# Patient Record
Sex: MALE | Race: WHITE | NOT HISPANIC OR LATINO | Employment: OTHER | URBAN - METROPOLITAN AREA
[De-identification: names, ages, dates, MRNs, and addresses within clinical notes are randomized per-mention and may not be internally consistent; named-entity substitution may affect disease eponyms.]

---

## 2017-01-03 DIAGNOSIS — I25.10 CORONARY ARTERY DISEASE INVOLVING NATIVE CORONARY ARTERY OF NATIVE HEART WITHOUT ANGINA PECTORIS: Primary | ICD-10-CM

## 2017-01-03 RX ORDER — CLOPIDOGREL BISULFATE 75 MG/1
75 TABLET ORAL DAILY
Qty: 90 TABLET | Refills: 3 | Status: ON HOLD | OUTPATIENT
Start: 2017-01-03 | End: 2017-04-26

## 2017-03-10 ENCOUNTER — TELEPHONE (OUTPATIENT)
Dept: FAMILY MEDICINE | Facility: CLINIC | Age: 67
End: 2017-03-10

## 2017-03-10 NOTE — LETTER
[facility]  [address] [address]  Phone: [phone]  March 10, 2017      Jayro Elder  [address]  [place] [facility] [address]      Dear Jayro,    We care about your health and have reviewed your health plan including your medical conditions, medications, and lab results.  Based on this review, it is recommended that you follow up regarding the following health topic(s):  -Diabetes    We recommend you take the following action(s):  -schedule a FOLLOWUP APPOINTMENT.     Please call us at the [facility] (or use F[employer]) to address the above recommendations.     Thank you for trusting [facility] and we appreciate the opportunity to serve you.  We look forward to supporting your healthcare needs in the future.    Healthy Regards,    Your Health Care Team  [facility]

## 2017-03-10 NOTE — TELEPHONE ENCOUNTER
Panel Management Review      Patient has the following on his problem list:     Diabetes    ASA: Passed    Last A1C  Lab Results   Component Value Date    A1C 10.5 12/02/2014    A1C 10.4 03/04/2014    A1C 9.2 11/14/2013    A1C 9.8 02/05/2009    A1C 11.3 04/30/2008     A1C tested: Passed    Last LDL:    Lab Results   Component Value Date    CHOL 109 06/30/2016     Lab Results   Component Value Date    HDL 47 06/30/2016     Lab Results   Component Value Date    LDL 47 06/30/2016     Lab Results   Component Value Date    TRIG 75 06/30/2016     Lab Results   Component Value Date    CHOLHDLRATIO 2.3 02/02/2015     Lab Results   Component Value Date    NHDL 62 06/30/2016       Is the patient on a Statin? YES             Is the patient on Aspirin? YES    Medications     HMG CoA Reductase Inhibitors    simvastatin (ZOCOR) 40 MG tablet    Salicylates    aspirin 81 MG EC tablet          Last three blood pressure readings:  BP Readings from Last 3 Encounters:   08/19/16 124/80   06/30/16 130/70   06/28/16 122/72       Date of last diabetes office visit: 6/17/16     Tobacco History:     History   Smoking Status     Former Smoker     Packs/day: 1.00     Years: 25.00     Types: Cigarettes     Quit date: 7/25/2005   Smokeless Tobacco     Never Used           Composite cancer screening  Chart review shows that this patient is due/due soon for the following Colonoscopy  Summary:    Patient is due/failing the following:   A1C and FOLLOW UP    Action needed:   Patient needs office visit for Diabetes check.    Type of outreach:    Sent letter.    Questions for provider review:    None                                                                                                                                    Nabila MICHAEL M.A.       Chart routed to Care Team .

## 2017-03-31 ENCOUNTER — OFFICE VISIT (OUTPATIENT)
Dept: FAMILY MEDICINE | Facility: CLINIC | Age: 67
End: 2017-03-31
Payer: COMMERCIAL

## 2017-03-31 VITALS
WEIGHT: 217.4 LBS | HEART RATE: 78 BPM | SYSTOLIC BLOOD PRESSURE: 132 MMHG | HEIGHT: 76 IN | TEMPERATURE: 97.7 F | BODY MASS INDEX: 26.47 KG/M2 | DIASTOLIC BLOOD PRESSURE: 80 MMHG | OXYGEN SATURATION: 98 %

## 2017-03-31 DIAGNOSIS — E11.65 TYPE 2 DIABETES MELLITUS WITH HYPERGLYCEMIA, UNSPECIFIED LONG TERM INSULIN USE STATUS: ICD-10-CM

## 2017-03-31 DIAGNOSIS — I25.10 CORONARY ARTERY DISEASE INVOLVING NATIVE CORONARY ARTERY OF NATIVE HEART WITHOUT ANGINA PECTORIS: ICD-10-CM

## 2017-03-31 DIAGNOSIS — M79.672 LEFT FOOT PAIN: ICD-10-CM

## 2017-03-31 DIAGNOSIS — Z01.818 PREOP GENERAL PHYSICAL EXAM: Primary | ICD-10-CM

## 2017-03-31 LAB
ERYTHROCYTE [DISTWIDTH] IN BLOOD BY AUTOMATED COUNT: 12.4 % (ref 10–15)
HBA1C MFR BLD: 13.1 % (ref 4.3–6)
HCT VFR BLD AUTO: 42.8 % (ref 40–53)
HGB BLD-MCNC: 14.5 G/DL (ref 13.3–17.7)
MCH RBC QN AUTO: 28.7 PG (ref 26.5–33)
MCHC RBC AUTO-ENTMCNC: 33.9 G/DL (ref 31.5–36.5)
MCV RBC AUTO: 85 FL (ref 78–100)
PLATELET # BLD AUTO: 247 10E9/L (ref 150–450)
RBC # BLD AUTO: 5.06 10E12/L (ref 4.4–5.9)
WBC # BLD AUTO: 6.7 10E9/L (ref 4–11)

## 2017-03-31 PROCEDURE — 85027 COMPLETE CBC AUTOMATED: CPT | Performed by: FAMILY MEDICINE

## 2017-03-31 PROCEDURE — 83036 HEMOGLOBIN GLYCOSYLATED A1C: CPT | Performed by: FAMILY MEDICINE

## 2017-03-31 PROCEDURE — 36415 COLL VENOUS BLD VENIPUNCTURE: CPT | Performed by: FAMILY MEDICINE

## 2017-03-31 PROCEDURE — 80048 BASIC METABOLIC PNL TOTAL CA: CPT | Performed by: FAMILY MEDICINE

## 2017-03-31 PROCEDURE — 99214 OFFICE O/P EST MOD 30 MIN: CPT | Performed by: FAMILY MEDICINE

## 2017-03-31 PROCEDURE — 93000 ELECTROCARDIOGRAM COMPLETE: CPT | Performed by: FAMILY MEDICINE

## 2017-03-31 NOTE — PATIENT INSTRUCTIONS
Please schedule an appointment with Dr. Lua for getting your diabetes under improved control. Then you may be able to proceed with the surgery.

## 2017-03-31 NOTE — PROGRESS NOTES
[facility]  [address] [address] [phone]  Dept: [phone]    PRE-OP EVALUATION:  Today's date: 3/31/2017    Jayro Elder (: 1950) presents for pre-operative evaluation assessment as requested by Dr. Toledo.  He requires evaluation and anesthesia risk assessment prior to undergoing surgery/procedure for treatment of left foot .  Proposed procedure: correction of bunion deformity left. Fusion interphalangeal joint left great toe. Correction of deformed second and third toes left foot.    Date of Surgery/ Procedure: 2017  Time of Surgery/ Procedure: 8:00  Hospital/Surgical Facility: [facility]  Fax number for surgical facility: [phone]  Primary Physician: No Ref-Primary, Physician  Type of Anesthesia Anticipated: General    Patient has a Health Care Directive or Living Will:  NO    1. YES - Do you have a history of heart attack, stroke, stent, bypass or surgery on an artery in the head, neck, heart or legs?  2. YES - will get a sharp zing every once in awhile. Brief sensation. Not kelly. Do you ever have any pain or discomfort in your chest?  3. NO - Do you have a history of  Heart Failure?  4. YES - not changed. Are you troubled by shortness of breath when: walking on the level, up a slight hill or at night?  5. NO - Do you currently have a cold, bronchitis or other respiratory infection?  6. NO - Do you have a cough, shortness of breath or wheezing?  7. NO - Do you sometimes get pains in the calves of your legs when you walk?  8. NO - Do you or anyone in your family have previous history of blood clots?  9. NO - Do you or does anyone in your family have a serious bleeding problem such as prolonged bleeding following surgeries or cuts?  10. NO - Have you ever had problems with anemia or been told to take iron pills?  11. NO - Have you had any abnormal blood loss such as black, tarry or bloody stools, or abnormal vaginal  bleeding?  12. YES - Have you ever had a blood transfusion?  13. NO - Have you or any of your relatives ever had problems with anesthesia?  14. YES - must be snoring. Sleep apnea; notes has not been able to tolerate CPAP. Do you have sleep apnea, excessive snoring or daytime drowsiness?  15. NO - Do you have any prosthetic heart valves?  16. NO - Do you have prosthetic joints?  17. NO - Is there any chance that you may be pregnant?      HPI:                                                      Brief HPI related to upcoming procedure: left foot pain.      See problem list for active medical problems.  Problems all longstanding and stable, except as noted/documented.  See ROS for pertinent symptoms related to these conditions.                                                                                                  .    MEDICAL HISTORY:                                                      Patient Active Problem List    Diagnosis Date Noted     CAD (coronary artery disease) 01/23/2015 6/05 subtotal occ.mid LAD,SUSAN mid LAD  7/05 mild CAD,patent stent,no flow-limiting lesions,sev.LV dysf.LAD enlarged   2/09 Sev.single vessel disease,Mod LV dysf.distal LAD 90%,70-75% mid lad just before prev stent,SUSAN to prox.mid LAD, endeavor to distal LAD  11/13/13 restenosis - stent to LAD       Cardiomyopathy (H)      Health Care Home 11/18/2013       Status:  NA -no care coordination needs 11/19/13  Care Coordinator:  Lashaun Arrington [phone]  See Letters for [facility] Plan           Type 2 diabetes, HbA1C goal < 8% (H) 10/31/2010     CARDIOVASCULAR SCREENING; LDL GOAL LESS THAN 100 10/31/2010     Diabetes mellitus, type 2 (H) 07/14/2005     Problem list name updated by automated process. Provider to review       Cardiovascular disease 07/14/2005     anterior MI,  PTCA and stent placed in mid LAD  Problem list name updated by automated process. 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       Generalized osteoarthrosis, unspecified site 11/13/2002     Tobacco use disorder 11/13/2002     Hypertension goal BP (blood pressure) < 140/90 11/13/2002      Past Medical History:   Diagnosis Date     CAD (coronary artery disease) 6/29/05    anterior MI,  PTCA and stent placed in mid LAD     Cardiomyopathy (H)      Essential hypertension, benign 11/13/2002     Generalized osteoarthrosis, unspecified site 11/13/2002     Myocardial infarction (H)      Neuropathy (H)      Tobacco use disorder 11/13/2002     Type II or unspecified type diabetes mellitus without mention of complication, not stated as uncontrolled 7/14/2005     Past Surgical History:   Procedure Laterality Date     ANGIOGRAM  6/29/05    subtotal occ.mid LAD,SUSAN mid LAD     ANGIOGRAM  7/05    mild CAD,patent stent,no flow-limiting lesions,sev.LV dysf.LAD enlarged     ANGIOGRAM  2/09    Sev.single vessel disease,Mod LV dysf.distal LAD 90%,70-75% mid lad just before prev stent,SUSAN to prox.mid LAD, endeavor to distal LAD     ANGIOGRAM  11/13/13    restenosis, stent LAD     C NONSPECIFIC PROCEDURE      Laminectomy x 3 - (1983 x 2 & 1990)     C NONSPECIFIC PROCEDURE  1998    Bunionectomy     C NONSPECIFIC PROCEDURE  1959    Gingival surgery at age 9     Current Outpatient Prescriptions   Medication Sig Dispense Refill     clopidogrel (PLAVIX) 75 MG tablet Take 1 tablet (75 mg) by mouth daily 90 tablet 3     simvastatin (ZOCOR) 40 MG tablet Take 1 tablet (40 mg) by mouth At Bedtime 90 tablet 3     metoprolol (TOPROL-XL) 100 MG 24 hr tablet Take 1 tablet (100 mg) by mouth At Bedtime 90 tablet 3     lisinopril (PRINIVIL,ZESTRIL) 20 MG tablet Take 1 tablet (20 mg) by mouth daily 90 tablet 3     amLODIPine (NORVASC) 5 MG tablet Take 1 tablet (5 mg) by mouth daily 90 tablet 3     COMPRESSION STOCKINGS 1 each daily 1 each 1     sitagliptan (JANUVIA) 50 MG tablet  Take 50 mg by mouth daily       nitroglycerin (NITROSTAT) 0.4 MG SL tablet Place 1 tablet (0.4 mg) under the tongue every 5 minutes as needed for chest pain 60 tablet 0     pantoprazole (PROTONIX) 40 MG enteric coated tablet Take 1 tablet (40 mg) by mouth every morning (before breakfast) 30 tablet 0     metFORMIN (GLUCOPHAGE) 1000 MG tablet Take 1 tablet (1,000 mg) by mouth 2 times daily (with meals) 60 tablet 0     aspirin 81 MG EC tablet Take 1 tablet (81 mg) by mouth daily 90 tablet 3     levothyroxine (SYNTHROID, LEVOTHROID) 137 MCG tablet Take 137 mcg by mouth At Bedtime  90 tablet 3     ONE TOUCH ULTRA TEST VI STRP use qid 120 11     ONE TOUCH LANCETS MISC use qid 120 4     OTC products: None, except as noted above    Allergies   Allergen Reactions     No Known Allergies       Latex Allergy: NO    Social History   Substance Use Topics     Smoking status: Former Smoker     Packs/day: 1.00     Years: 25.00     Types: Cigarettes     Quit date: 7/25/2005     Smokeless tobacco: Never Used     Alcohol use Yes      Comment: occasional     History   Drug Use No       REVIEW OF SYSTEMS:                                                    C: NEGATIVE for fever, chills, change in weight  E/M: NEGATIVE for ear, mouth and throat problems. X chronic drippy nose.  R: NEGATIVE for significant cough or short of breath. Short of breath in evening more than earlier in the day. Not new.   CV: NEGATIVE for chest pain, palpitations or change in peripheral edema. Just brief sharp zings in chest; not new. Does not use NTG for this.  No nausea, vomitting or change in bowel habits.  No urinary symptoms.    Sees Dr. Lua for DM. Last HgbA1C was 9._  He notes he does not check his glucoses since he stopped insulin (insurance will not pay for it). He notes Dr. Lua is aware. Believes his last visit was last Fall.    EXAM:                                                    /80 (BP Location: Right arm, Patient Position: Chair, Cuff  "Size: Adult Regular)  Pulse 78  Temp 97.7  F (36.5  C) (Oral)  Ht 6' 4\" (1.93 m)  Wt 217 lb 6.4 oz (98.6 kg)  SpO2 98%  BMI 26.46 kg/m2  GENERAL APPEARANCE: alert and no distress  HENT: ear canals and TM's normal and nose and mouth without ulcers or lesions  RESP: lungs clear to auscultation - no rales, rhonchi or wheezes  CV: regular rates and rhythm  ABDOMEN: soft, nontender, no HSM or masses and bowel sounds normal  MS:  trace to + pitting edema both ankles  NEURO: Normal strength and tone, sensory exam grossly normal, mentation intact and speech normal  PSYCH: mentation appears normal and affect normal/bright        DIAGNOSTICS:                                                      EKG: Normal Sinus Rhythm, Incomplete right bundle branch block, evidence for old anterior MI, unchanged from previous tracings  Labs Resulted Today:   Results for orders placed or performed in visit on 03/31/17   CBC with platelets   Result Value Ref Range    WBC 6.7 4.0 - 11.0 10e9/L    RBC Count 5.06 4.4 - 5.9 10e12/L    Hemoglobin 14.5 13.3 - 17.7 g/dL    Hematocrit 42.8 40.0 - 53.0 %    MCV 85 78 - 100 fl    MCH 28.7 26.5 - 33.0 pg    MCHC 33.9 31.5 - 36.5 g/dL    RDW 12.4 10.0 - 15.0 %    Platelet Count 247 150 - 450 10e9/L   Hemoglobin A1c   Result Value Ref Range    Hemoglobin A1C 13.1 (H) 4.3 - 6.0 %       Recent Labs   Lab Test 12/02/14 03/04/14 11/14/13   0710  11/13/13   1540  11/13/13   1128   11/03/10   0940   02/11/09   1235   HGB   --    --   14.1  13.5  14.4   < >   --    < >  15.4   PLT   --    --   223  217  227   < >   --    < >  260   INR   --    --    --    --    --    --   1.03   --   0.91   NA   --    --   137   --   135   < >   --    < >  137   POTASSIUM  4  4.4  3.8   --   4.0   < >   --    < >  4.2   CR  0.83  0.93  0.70   --   0.73   < >   --    < >  0.79   A1C  10.5*  10.4*  9.2*   --    --    --    --    --    --     < > = values in this interval not displayed.        IMPRESSION:                      "                               Reason for surgery/procedure:  Left foot pain.    The proposed surgical procedure is considered INTERMEDIATE risk.    REVISED CARDIAC RISK INDEX  The patient has the following serious cardiovascular risks for perioperative complications such as (MI, PE, VFib and 3  AV Block):  Coronary Artery Disease (MI, positive stress test, angina, Qs on EKG)  INTERPRETATION: 1 risks: Class II (low risk - 0.9% complication rate)    The patient has the following additional risks for perioperative complications:   Diabetes out of control    Preop general physical exam    - EKG 12-lead complete w/read - Clinics  - Basic metabolic panel  - CBC with platelets  - Hemoglobin A1c    Left foot pain   He is hoping for surgery. However, see below. Canceling surgery due to his diabetes being out-of-control.    Type 2 diabetes mellitus with hyperglycemia, unspecified long term insulin use status (H)   He is not currently on insulin. He notes that he has stopped this due to cost. He does note that his endocrinologist is aware.   Discuss the importance of having improved control prior to surgery. There might be some consideration for assistance in obtaining medication; recommend to work with his endocrinologist.  - Hemoglobin A1c    Coronary artery disease involving native coronary artery of native heart without angina pectoris   He does see cardiology on an annual basis. He is currently asymptomatic. He is almost due to be seen. I do encourage this in the very near future.   Again, encourage improved diabetic control to also decrease his risk for future coronary events as well.  - EKG 12-lead complete w/read - Clinics      RECOMMENDATIONS:                                                              Surgery is NOT recommended due to uncontrolled diabetes (A1C >8.5%, Glucose >200 mg/dl). Stabilization required prior to elective surgery. Referral to Endocrinology    Discussed with his surgeon, Dr. Bacon and made  the decision together. He can consider rescheduling once his sugars are under better control.    Cc copy note and lab to Dr. Lua.    Signed Electronically by: Miesha Mcdaniel MD, MD    Copy of this evaluation report is provided to requesting physician.     This note was completed with the assistance of [employer] software.    [employer] Preop Guidelines

## 2017-03-31 NOTE — NURSING NOTE
"Chief Complaint   Patient presents with     Pre-Op Exam       Initial /80 (BP Location: Right arm, Patient Position: Chair, Cuff Size: Adult Regular)  Pulse 78  Temp 97.7  F (36.5  C) (Oral)  Ht 6' 4\" (1.93 m)  Wt 217 lb 6.4 oz (98.6 kg)  SpO2 98%  BMI 26.46 kg/m2 Estimated body mass index is 26.46 kg/(m^2) as calculated from the following:    Height as of this encounter: 6' 4\" (1.93 m).    Weight as of this encounter: 217 lb 6.4 oz (98.6 kg).  Medication Reconciliation: complete   Dulce Guadalupe, LUDWIN      "

## 2017-03-31 NOTE — MR AVS SNAPSHOT
"              After Visit Summary   3/31/2017    Jayro Elder    MRN: [id number]           Patient Information     Date Of Birth          1950        Visit Information        Provider Department      3/31/2017 9:50 AM Miesha Mcdaniel MD [facility]        Today's Diagnoses     Preop general physical exam    -  1    Left foot pain        Type 2 diabetes mellitus without complication, unspecified long term insulin use status (H)        Coronary artery disease involving native coronary artery of native heart without angina pectoris          Care Instructions          Please schedule an appointment with Dr. Lua for getting your diabetes under improved control. Then you may be able to proceed with the surgery.            Follow-ups after your visit        Who to contact     If you have questions or need follow up information about today's clinic visit or your schedule please contact [facility] directly at [phone].  Normal or non-critical lab and imaging results will be communicated to you by Interwisehart, letter or phone within 4 business days after the clinic has received the results. If you do not hear from us within 7 days, please contact the clinic through Interwisehart or phone. If you have a critical or abnormal lab result, we will notify you by phone as soon as possible.  Submit refill requests through Re2you or call your pharmacy and they will forward the refill request to us. Please allow 3 business days for your refill to be completed.          Additional Information About Your Visit        MyChart Information     Re2you lets you send messages to your doctor, view your test results, renew your prescriptions, schedule appointments and more. To sign up, go to [email or web address] . Click on \"Log in\" on the left side of the screen, which will take you to the Welcome page. Then click on \"Sign up Now\" on the right side of the page.     You will be asked to enter the access " "code listed below, as well as some personal information. Please follow the directions to create your username and password.     Your access code is: [email or web address]  Expires: 2017 11:13 AM     Your access code will  in 90 days. If you need help or a new code, please call your [place] clinic or [phone].        Care EveryWhere ID     This is your Care EveryWhere ID. This could be used by other organizations to access your [place] medical records  [id number]        Your Vitals Were     Pulse Temperature Height Pulse Oximetry BMI (Body Mass Index)       78 97.7  F (36.5  C) (Oral) 6' 4\" (1.93 m) 98% 26.46 kg/m2        Blood Pressure from Last 3 Encounters:   17 132/80   16 124/80   16 130/70    Weight from Last 3 Encounters:   17 217 lb 6.4 oz (98.6 kg)   16 223 lb (101.2 kg)   16 223 lb (101.2 kg)              We Performed the Following     Basic metabolic panel     CBC with platelets     EKG 12-lead complete w/read - Clinics     Hemoglobin A1c          Today's Medication Changes          These changes are accurate as of: 3/31/17 11:13 AM.  If you have any questions, ask your nurse or doctor.               Stop taking these medicines if you haven't already. Please contact your care team if you have questions.     amoxicillin-clavulanate 875-125 MG per tablet   Commonly known as:  AUGMENTIN   Stopped by:  Miesha Mcdaniel MD           COMPRESSION STOCKINGS   Stopped by:  Miesha Mcdaniel MD                    Primary Care Provider    Physician No Ref-Primary       No address on file        Thank you!     Thank you for choosing [facility]  for your care. Our goal is always to provide you with excellent care. Hearing back from our patients is one way we can continue to improve our services. Please take a few minutes to complete the written survey that you may receive in the mail after your visit with us. Thank you!             Your Updated Medication List - " Protect others around you: Learn how to safely use, store and throw away your medicines at www.disposemymeds.org.          This list is accurate as of: 3/31/17 11:13 AM.  Always use your most recent med list.                   Brand Name Dispense Instructions for use    amLODIPine 5 MG tablet    NORVASC    90 tablet    Take 1 tablet (5 mg) by mouth daily       aspirin 81 MG EC tablet     90 tablet    Take 1 tablet (81 mg) by mouth daily       clopidogrel 75 MG tablet    PLAVIX    90 tablet    Take 1 tablet (75 mg) by mouth daily       JANUVIA 50 MG tablet   Generic drug:  sitagliptin      Take 50 mg by mouth daily       levothyroxine 137 MCG tablet    SYNTHROID/LEVOTHROID    90 tablet    Take 137 mcg by mouth At Bedtime       lisinopril 20 MG tablet    PRINIVIL/ZESTRIL    90 tablet    Take 1 tablet (20 mg) by mouth daily       metFORMIN 1000 MG tablet    GLUCOPHAGE    60 tablet    Take 1 tablet (1,000 mg) by mouth 2 times daily (with meals)       metoprolol 100 MG 24 hr tablet    TOPROL-XL    90 tablet    Take 1 tablet (100 mg) by mouth At Bedtime       nitroglycerin 0.4 MG sublingual tablet    NITROSTAT    60 tablet    Place 1 tablet (0.4 mg) under the tongue every 5 minutes as needed for chest pain       ONE TOUCH LANCETS Misc     120    use qid       ONE TOUCH ULTRA test strip   Generic drug:  blood glucose monitoring     120    use qid       pantoprazole 40 MG EC tablet    PROTONIX    30 tablet    Take 1 tablet (40 mg) by mouth every morning (before breakfast)       simvastatin 40 MG tablet    ZOCOR    90 tablet    Take 1 tablet (40 mg) by mouth At Bedtime

## 2017-03-31 NOTE — LETTER
April 3, 2017      Jayro Elder  [address] [place] [address]        Dear  Jayro MESSINA Jael,    Enclosed is a copy of your recent results.    I do see that you were called when the elevated glucose came back.    I am glad that you scheduled an appointment with Dr. Lua.    Please feel free to call us at [phone] if you have further questions or concerns.    Have a great Spring!    Sincerely,     Miesha Mcdaniel MD/ismael

## 2017-04-01 ENCOUNTER — TELEPHONE (OUTPATIENT)
Dept: FAMILY MEDICINE | Facility: CLINIC | Age: 67
End: 2017-04-01

## 2017-04-01 LAB
ANION GAP SERPL CALCULATED.3IONS-SCNC: 8 MMOL/L (ref 3–14)
BUN SERPL-MCNC: 20 MG/DL (ref 7–30)
CALCIUM SERPL-MCNC: 8.8 MG/DL (ref 8.5–10.1)
CHLORIDE SERPL-SCNC: 99 MMOL/L (ref 94–109)
CO2 SERPL-SCNC: 27 MMOL/L (ref 20–32)
CREAT SERPL-MCNC: 0.83 MG/DL (ref 0.66–1.25)
GFR SERPL CREATININE-BSD FRML MDRD: ABNORMAL ML/MIN/1.7M2
GLUCOSE SERPL-MCNC: 533 MG/DL (ref 70–99)
POTASSIUM SERPL-SCNC: 4.4 MMOL/L (ref 3.4–5.3)
SODIUM SERPL-SCNC: 134 MMOL/L (ref 133–144)

## 2017-04-02 NOTE — TELEPHONE ENCOUNTER
MD on Call    PCP: Violeta  appt 3/31/2017    Lab Results   Component Value Date    A1C 13.1 03/31/2017    A1C 10.5 12/02/2014        Lab Results   Component Value Date     03/31/2017     12/02/2014     12/02/2014      Contacted by [facility] due to critical value as listed above  Will send to provider- Dr. Mcdaniel who saw pt for Preop.  Agree with plans to hold off on elective surgery.  Recommend he follow up with Dr. Mcdaniel and Dr. Lua ( endocrine) next week to reassess meds;     Lab Results   Component Value Date    CR 0.83 03/31/2017    CR 0.83 12/02/2014       Home Phone [phone]   Mobile [phone]       Spoke to pt via cell number.  He is feeling OK,   Regarding diabetes, he continues to take Metformin and Januvia. He has not been on insulin for quite some time.  He has appt with Dr. Lua on Thursday 4/6/2017.    Sarahi Lomeli MD  Internal Medicine  electronically signed

## 2017-04-20 ENCOUNTER — HOSPITAL ENCOUNTER (INPATIENT)
Facility: CLINIC | Age: 67
LOS: 5 days | Discharge: HOME | DRG: 065 | End: 2017-04-26
Attending: EMERGENCY MEDICINE | Admitting: INTERNAL MEDICINE
Payer: COMMERCIAL

## 2017-04-20 ENCOUNTER — APPOINTMENT (OUTPATIENT)
Dept: MRI IMAGING | Facility: CLINIC | Age: 67
DRG: 065 | End: 2017-04-20
Attending: EMERGENCY MEDICINE
Payer: COMMERCIAL

## 2017-04-20 ENCOUNTER — TELEPHONE (OUTPATIENT)
Dept: CARDIOLOGY | Facility: CLINIC | Age: 67
End: 2017-04-20

## 2017-04-20 DIAGNOSIS — I25.119 CORONARY ARTERY DISEASE INVOLVING NATIVE CORONARY ARTERY OF NATIVE HEART WITH ANGINA PECTORIS (H): ICD-10-CM

## 2017-04-20 DIAGNOSIS — E11.29 TYPE 2 DIABETES MELLITUS WITH OTHER KIDNEY COMPLICATION: ICD-10-CM

## 2017-04-20 DIAGNOSIS — I61.9 HEMORRHAGIC STROKE (H): ICD-10-CM

## 2017-04-20 DIAGNOSIS — I63.432 CEREBROVASCULAR ACCIDENT (CVA) DUE TO EMBOLISM OF LEFT POSTERIOR CEREBRAL ARTERY (H): ICD-10-CM

## 2017-04-20 DIAGNOSIS — I63.432 CEREBRAL INFARCTION DUE TO EMBOLISM OF LEFT POSTERIOR CEREBRAL ARTERY (H): ICD-10-CM

## 2017-04-20 DIAGNOSIS — I10 ESSENTIAL HYPERTENSION: ICD-10-CM

## 2017-04-20 DIAGNOSIS — I42.9 CARDIOMYOPATHY (H): Primary | ICD-10-CM

## 2017-04-20 DIAGNOSIS — I10 HYPERTENSION GOAL BP (BLOOD PRESSURE) < 140/90: ICD-10-CM

## 2017-04-20 DIAGNOSIS — I10 ESSENTIAL HYPERTENSION WITH GOAL BLOOD PRESSURE LESS THAN 140/90: ICD-10-CM

## 2017-04-20 DIAGNOSIS — E11.65 POORLY CONTROLLED TYPE 2 DIABETES MELLITUS (H): ICD-10-CM

## 2017-04-20 DIAGNOSIS — I25.10 CORONARY ARTERY DISEASE INVOLVING NATIVE CORONARY ARTERY OF NATIVE HEART WITHOUT ANGINA PECTORIS: ICD-10-CM

## 2017-04-20 DIAGNOSIS — I48.91 NEW ONSET ATRIAL FIBRILLATION (H): ICD-10-CM

## 2017-04-20 LAB
ANION GAP SERPL CALCULATED.3IONS-SCNC: 10 MMOL/L (ref 3–14)
BASOPHILS # BLD AUTO: 0 10E9/L (ref 0–0.2)
BASOPHILS NFR BLD AUTO: 0.2 %
BUN SERPL-MCNC: 16 MG/DL (ref 7–30)
CALCIUM SERPL-MCNC: 8.7 MG/DL (ref 8.5–10.1)
CHLORIDE SERPL-SCNC: 104 MMOL/L (ref 94–109)
CO2 SERPL-SCNC: 26 MMOL/L (ref 20–32)
CREAT SERPL-MCNC: 0.9 MG/DL (ref 0.66–1.25)
DIFFERENTIAL METHOD BLD: NORMAL
EOSINOPHIL # BLD AUTO: 0.1 10E9/L (ref 0–0.7)
EOSINOPHIL NFR BLD AUTO: 1.4 %
ERYTHROCYTE [DISTWIDTH] IN BLOOD BY AUTOMATED COUNT: 13.1 % (ref 10–15)
GFR SERPL CREATININE-BSD FRML MDRD: 85 ML/MIN/1.7M2
GLUCOSE SERPL-MCNC: 345 MG/DL (ref 70–99)
HCT VFR BLD AUTO: 43.2 % (ref 40–53)
HGB BLD-MCNC: 14.6 G/DL (ref 13.3–17.7)
IMM GRANULOCYTES # BLD: 0 10E9/L (ref 0–0.4)
IMM GRANULOCYTES NFR BLD: 0 %
INTERPRETATION ECG - MUSE: NORMAL
LYMPHOCYTES # BLD AUTO: 1.4 10E9/L (ref 0.8–5.3)
LYMPHOCYTES NFR BLD AUTO: 27.8 %
MCH RBC QN AUTO: 29.1 PG (ref 26.5–33)
MCHC RBC AUTO-ENTMCNC: 33.8 G/DL (ref 31.5–36.5)
MCV RBC AUTO: 86 FL (ref 78–100)
MONOCYTES # BLD AUTO: 0.6 10E9/L (ref 0–1.3)
MONOCYTES NFR BLD AUTO: 12.2 %
NEUTROPHILS # BLD AUTO: 2.8 10E9/L (ref 1.6–8.3)
NEUTROPHILS NFR BLD AUTO: 58.4 %
NRBC # BLD AUTO: 0 10*3/UL
NRBC BLD AUTO-RTO: 0 /100
PLATELET # BLD AUTO: 213 10E9/L (ref 150–450)
POTASSIUM SERPL-SCNC: 3.6 MMOL/L (ref 3.4–5.3)
RBC # BLD AUTO: 5.01 10E12/L (ref 4.4–5.9)
SODIUM SERPL-SCNC: 140 MMOL/L (ref 133–144)
TROPONIN I SERPL-MCNC: NORMAL UG/L (ref 0–0.04)
WBC # BLD AUTO: 4.9 10E9/L (ref 4–11)

## 2017-04-20 PROCEDURE — 99285 EMERGENCY DEPT VISIT HI MDM: CPT | Mod: 25

## 2017-04-20 PROCEDURE — 70553 MRI BRAIN STEM W/O & W/DYE: CPT

## 2017-04-20 PROCEDURE — 25000128 H RX IP 250 OP 636: Performed by: EMERGENCY MEDICINE

## 2017-04-20 PROCEDURE — 70544 MR ANGIOGRAPHY HEAD W/O DYE: CPT

## 2017-04-20 PROCEDURE — 96361 HYDRATE IV INFUSION ADD-ON: CPT

## 2017-04-20 PROCEDURE — 93005 ELECTROCARDIOGRAM TRACING: CPT

## 2017-04-20 PROCEDURE — 96375 TX/PRO/DX INJ NEW DRUG ADDON: CPT

## 2017-04-20 PROCEDURE — 84484 ASSAY OF TROPONIN QUANT: CPT | Performed by: EMERGENCY MEDICINE

## 2017-04-20 PROCEDURE — 96365 THER/PROPH/DIAG IV INF INIT: CPT

## 2017-04-20 PROCEDURE — 80048 BASIC METABOLIC PNL TOTAL CA: CPT | Performed by: EMERGENCY MEDICINE

## 2017-04-20 PROCEDURE — 85025 COMPLETE CBC W/AUTO DIFF WBC: CPT | Performed by: EMERGENCY MEDICINE

## 2017-04-20 PROCEDURE — 70549 MR ANGIOGRAPH NECK W/O&W/DYE: CPT

## 2017-04-20 RX ADMIN — SODIUM CHLORIDE 1000 ML: 9 INJECTION, SOLUTION INTRAVENOUS at 22:21

## 2017-04-20 ASSESSMENT — ENCOUNTER SYMPTOMS
WEAKNESS: 0
HEADACHES: 1
FEVER: 0
FACIAL ASYMMETRY: 0
VOMITING: 0
SPEECH DIFFICULTY: 0
DIZZINESS: 1
NUMBNESS: 0
CONFUSION: 0

## 2017-04-20 NOTE — TELEPHONE ENCOUNTER
Transfer call from scheduling toTriage from patient's wife who is attempting to set up appointment for her  due to recent loss of vision. Wife states patient is traveling with family back from a  in [place] and reported to her he has lost vision in at least one of his eyes; wife further voices she wasn't sure if she should be concerned as patient had several drinks the other day, she further reports patient is currently in the [facility] area. Wife set up conference call w/  - patient confirms he has been having vision problems since Monday, family will not let him drive, today he reports being unable to see his hand with his right eye as he moves his hand to the periphery and has a headache for the last two days not relieved w/ advil use. Instructed patient to have family immediately bring him to an Emergency room in the [facility] area for evaluation - patient states agreement.  Miesha Tate RN

## 2017-04-20 NOTE — IP AVS SNAPSHOT
[address] [facility]    [address]    [place] [address]    Phone:  [phone]                                       After Visit Summary   4/20/2017    Jayro Elder    MRN: [id number]           After Visit Summary Signature Page     I have received my discharge instructions, and my questions have been answered. I have discussed any challenges I see with this plan with the nurse or doctor.    ..........................................................................................................................................  Patient/Patient Representative Signature      ..........................................................................................................................................  Patient Representative Print Name and Relationship to Patient    ..................................................               ................................................  Date                                            Time    ..........................................................................................................................................  Reviewed by Signature/Title    ...................................................              ..............................................  Date                                                            Time

## 2017-04-20 NOTE — IP AVS SNAPSHOT
MRN:[id number]                      After Visit Summary   4/20/2017    Jayro Elder    MRN: [id number]           Thank you!     Thank you for choosing [facility] for your care. Our goal is always to provide you with excellent care. Hearing back from our patients is one way we can continue to improve our services. Please take a few minutes to complete the written survey that you may receive in the mail after you visit with us. Thank you!        Patient Information     Date Of Birth          1950        Designated Caregiver       Most Recent Value    Caregiver    Will someone help with your care after discharge? yes    Name of designated caregiver cameron JULES    Phone number of caregiver [phone]    Caregiver address [place]      About your hospital stay     You were admitted on:  April 21, 2017 You last received care in the:  [address] [facility]    You were discharged on:  April 26, 2017        Reason for your hospital stay       For treatment of stroke and new diagnosis of atrial fibrillation.                  Who to Call     For medical emergencies, please call 911.  For non-urgent questions about your medical care, please call your primary care provider or clinic, [phone]          Attending Provider     Provider Specialty    Elaine Blanco MD Emergency Medicine    Arriaga, Mateo Yee MD Internal Medicine       Primary Care Provider Office Phone # Fax #    Davion Cordova PA-C [phone]       [facility] [place] [address]  [place] [address]        After Care Instructions     Activity       Your activity upon discharge: activity as tolerated            Diet       Follow this diet upon discharge: Orders Placed This Encounter      Moderate Consistent CHO Diet                  Follow-up Appointments     Follow-up and recommended labs and tests        Follow up with primary care provider, Davion JAVIER at [facility] for hospital follow- up.  The following labs/tests are recommended: Blood Glucose. Bring your glucometer.  Follow up with Gloria Beebe NP on Tues May 16th at 1:30 pm.  [facility]  [phone]  [address], [address]  [facility] [address]                  Your next 10 appointments already scheduled     Apr 28, 2017  2:00 PM CDT   [facility] Visit with Davion Cord[facility]-C   [facility]    [address] [facility] [address] [phone]           Bring a current list of meds and any records pertaining to this visit.  For Physicals, please bring immunization records and any forms needing to be filled out.  Please arrive 10 minutes early to complete paperwork.            May 24, 2017  1:50 PM CDT   Return Visit with LORRAINE Fung CNP   [facility] [place] ([facility])    [address]  [facility] [address] [phone]              Additional Services     Med Therapy Manage Referral       Your provider has referred you to: **[facility] [place] [facility] Scheduling (numerous locations) [phone]   [email or web address]  [facility]: [facility] [phone]   [email or web address]    Reason for Referral: transition of care    The [place] [facility] department will contact you to schedule an appointment.  You may also schedule the appointment by calling [phone].  For [place] patients, please call [phone] to confirm/schedule your appointment on the next business day.    This service is designed to help you get the most from your medications.  A specially trained Pharmacist will work closely with you and your providers to solve any questions, concerns, issues or problems  related to your medications.    Please bring all of your prescription and non-prescription medications (such as vitamins, over-the-counter medications, and herbals) or a detailed medication list to your appointment.    If you have a glucose meter or other home monitoring information, please also bring this to your appointment (i.e. blood glucose log, blood pressure log, pain log, etc.).            Occupational Therapy Referral       *This therapy referral will be filtered to a centralized scheduling office at [facility] and the patient will receive a call to schedule an appointment at a [place] location most convenient for them. *     [facility] provides Occupational Therapy evaluation and treatment and many specialty services across the [facility].  If requesting a specialty program, please choose from the list below.    If you have not heard from the scheduling office within 2 business days, please call [phone] for all locations, with the exception of [place], please call [phone].     Treatment: Evaluation & Treatment  Special Instructions/Modalities: Stroke and vision loss  Special Programs:Stroke and vision loss    Please be aware that coverage of these services is subject to the terms and limitations of your health insurance plan.  Call member services at your health plan with any benefit or coverage questions.      **Note to Provider:  If you are referring outside of [place] for the therapy appointment, please list the name of the location in the  special instructions  above, print the referral and give to the patient to schedule the appointment.            Physical Therapy Referral       *This therapy referral will be filtered to a centralized scheduling office at [facility] and the patient will receive a call to schedule an appointment at a [place] location most convenient for them. *     [facility]  provides Physical Therapy evaluation and treatment and many specialty services across the [facility] system.  If requesting a specialty program, please choose from the list below.    If you have not heard from the scheduling office within 2 business days, please call [phone] for all locations, with the exception of [place], please call [phone].  Treatment: Evaluation & Treatment  Special Instructions/Modalities: Stroke  Special Programs: Stroke    Please be aware that coverage of these services is subject to the terms and limitations of your health insurance plan.  Call member services at your health plan with any benefit or coverage questions.      **Note to Provider:  If you are referring outside of [facility] for the therapy appointment, please list the name of the location in the  special instructions  above, print the referral and give to the patient to schedule the appointment.            Speech Therapy Referral       *This therapy referral will be filtered to a centralized scheduling office at [facility] and the patient will receive a call to schedule an appointment at a [place] location most convenient for them. *     [facility] provides Speech Therapy evaluation and treatment and many specialty services across the [facility] system.  If requesting a specialty program, please choose from the list below.  If you have not heard from the scheduling office within 2 business days, please call [phone] for all locations, with the exception of [place], please call [phone].       Treatment: Evaluation & Treatment  Speech Treatment Diagnosis: Dysarthria  Special Instructions: Stroke and dysarthria    Please be aware that coverage of these services is subject to the terms and limitations of your health insurance plan.  Call member services at your health plan with any benefit or coverage questions.      **Note to Provider:  If you are referring outside of [place]  for the therapy appointment, please list the name of the location in the  special instructions  above, print the referral and give to the patient to schedule the appointment.                  Further instructions from your care team       Follow-up with the [facility] in 1 month with Dr. Berry. Call [phone] to make an appointment.   Address: [address] [place] [address]. [address], [place] [address]    Follow-up with Cardiology in 1 month to discuss anticoagulation for atrial fibrillation.     Your risk factors for stroke or TIA (transient ischemic attack):    Your Risk Factors Your Results Normal Ranges   High blood pressure  Continue to take BP medications as prescribed. Avoid stressful situations if at possible. BP Readings from Last 1 Encounters:   04/25/17 132/85    Less than 120/80   Cholesterol              Total  Continue to take your simvastatin as prescribed. Practice eating a healthy, low-saturated fat diet. Lab Results   Component Value Date    CHOL 118 04/21/2017      Less than 150    Triglycerides   Lab Results   Component Value Date    TRIG 59 04/21/2017    Less than 150   LDL Lab Results   Component Value Date    LDL 55 04/21/2017       Less than 70   HDL Lab Results   Component Value Date    HDL 51 04/21/2017            Greater than 40 (men)  Greater than 50 (women)   Diabetes  Monitor your blood sugar and take Insulin as need. Practice eating a healthy low-carb diet   Recent Labs  Lab 04/25/17  0753   GLC 91    Fasting blood glucose    Smoking/tobacco use  Quit smoking and tobacco   Overweight  Work with your PCP to establish a healthy diet  Lose 1-2 pounds a week   Lack of exercise  Work with your PCP to establish a healthy exercise program  30 minutes moderate activity each day   Other risk factors include carotid (neck) artery disease, atrial fibrillation and stress. You may be on new medicine to treat high blood pressure, cholesterol, diabetes or atrial  "fibrillation.    Understanding Stroke Booklet given to patient. Please refer to booklet for further information.    Stroke warning signs and symptoms - CALL 911 right away for:  - Sudden numbness or weakness in the face, arm or leg (often on one side of the body).  - Sudden confusion or trouble understanding what is going on.  - Sudden blurred or decreased vision in one or both eyes.  - Sudden trouble speaking, loss of balance, dizziness or problems with coordination.  - Sudden, severe headache for no reason.  - Fainting or seizures.  - Symptoms may go away then come back suddenly.                      Pending Results     No orders found from 4/18/2017 to 4/21/2017.            Statement of Approval     Ordered          04/26/17 1021  I have reviewed and agree with all the recommendations and orders detailed in this document.  EFFECTIVE NOW     Approved and electronically signed by:  Froilan Benítez MD             Admission Information     Date & Time Provider Department Dept. Phone    4/20/2017 Mateo Arriaga MD [address] [facility] [place] [phone]      Your Vitals Were     Blood Pressure Pulse Temperature Respirations Height Weight    139/87 (BP Location: Right arm) 60 97.8  F (36.6  C) (Oral) 16 1.829 m (6') 103.9 kg (229 lb 0.9 oz)    Pulse Oximetry BMI (Body Mass Index)                97% 31.07 kg/m2          MyChart Information     [employer] lets you send messages to your doctor, view your test results, renew your prescriptions, schedule appointments and more. To sign up, go to [email or web address] . Click on \"Log in\" on the left side of the screen, which will take you to the Welcome page. Then click on \"Sign up Now\" on the right side of the page.     You will be asked to enter the access code listed below, as well as some personal information. Please follow the directions to create your username and password.     Your access code is: [email or web address]  Expires: 6/29/2017 11:13 AM   "   Your access code will  in 90 days. If you need help or a new code, please call your [facility] clinic or [phone].        Care EveryWhere ID     This is your Care EveryWhere ID. This could be used by other organizations to access your [facility] medical records  [id number]           Review of your medicines      START taking        Dose / Directions    isosorbide mononitrate 30 MG 24 hr tablet   Commonly known as:  IMDUR   Used for:  Hypertension goal BP (blood pressure) < 140/90        Dose:  15 mg   Take 0.5 tablets (15 mg) by mouth daily   Quantity:  15 tablet   Refills:  0       order for DME   Used for:  Hemorrhagic stroke (H)        Equipment being ordered: Cane () Treatment Diagnosis: Impaired balance   Quantity:  1 each   Refills:  0         CONTINUE these medicines which may have CHANGED, or have new prescriptions. If we are uncertain of the size of tablets/capsules you have at home, strength may be listed as something that might have changed.        Dose / Directions    amLODIPine 5 MG tablet   Commonly known as:  NORVASC   This may have changed:  when to take this   Used for:  Coronary artery disease involving native coronary artery of native heart with angina pectoris (H)        Dose:  5 mg   Take 1 tablet (5 mg) by mouth 2 times daily   Quantity:  90 tablet   Refills:  3       insulin glargine 100 UNIT/ML injection   This may have changed:  how much to take   Used for:  Type 2 diabetes mellitus with other kidney complication (H)        Dose:  20 Units   Inject 20 Units Subcutaneous every 24 hours   Quantity:  6 mL   Refills:  0       lisinopril 20 MG tablet   Commonly known as:  PRINIVIL/ZESTRIL   This may have changed:    - how much to take  - when to take this   Used for:  Cardiomyopathy (H), Coronary artery disease involving native coronary artery of native heart without angina pectoris, Essential hypertension with goal blood pressure less than 140/90        Dose:  40 mg   Take 2  tablets (40 mg) by mouth At Bedtime   Quantity:  30 tablet   Refills:  0         CONTINUE these medicines which have NOT CHANGED        Dose / Directions    JANUVIA 50 MG tablet   Generic drug:  sitagliptin        Dose:  50 mg   Take 50 mg by mouth daily   Refills:  0       levothyroxine 137 MCG tablet   Commonly known as:  SYNTHROID/LEVOTHROID        Dose:  137 mcg   Take 137 mcg by mouth At Bedtime   Quantity:  90 tablet   Refills:  3       metFORMIN 1000 MG tablet   Commonly known as:  GLUCOPHAGE   Used for:  Type II or unspecified type diabetes mellitus without mention of complication, not stated as uncontrolled        Dose:  1000 mg   Take 1 tablet (1,000 mg) by mouth 2 times daily (with meals)   Quantity:  60 tablet   Refills:  0       metoprolol 100 MG 24 hr tablet   Commonly known as:  TOPROL-XL   Used for:  Coronary artery disease involving native coronary artery of native heart without angina pectoris        Dose:  100 mg   Take 1 tablet (100 mg) by mouth At Bedtime   Quantity:  90 tablet   Refills:  3       nitroglycerin 0.4 MG sublingual tablet   Commonly known as:  NITROSTAT   Used for:  Chest pain        Dose:  0.4 mg   Place 1 tablet (0.4 mg) under the tongue every 5 minutes as needed for chest pain   Quantity:  60 tablet   Refills:  0       ONE TOUCH LANCETS Misc        use qid   Quantity:  120   Refills:  4       ONE TOUCH ULTRA test strip   Used for:  Type II or unspecified type diabetes mellitus without mention of complication, not stated as uncontrolled   Generic drug:  blood glucose monitoring        use qid   Quantity:  120   Refills:  11       pantoprazole 40 MG EC tablet   Commonly known as:  PROTONIX   Used for:  GERD (gastroesophageal reflux disease)        Dose:  40 mg   Take 1 tablet (40 mg) by mouth every morning (before breakfast)   Quantity:  30 tablet   Refills:  0       simvastatin 40 MG tablet   Commonly known as:  ZOCOR   Used for:  Coronary artery disease involving native coronary  artery of native heart without angina pectoris        Dose:  40 mg   Take 1 tablet (40 mg) by mouth At Bedtime   Quantity:  90 tablet   Refills:  3         STOP taking     aspirin 81 MG EC tablet           clopidogrel 75 MG tablet   Commonly known as:  PLAVIX           IBUPROFEN PO                Where to get your medicines      These medications were sent to [id number] - [place], [place] - [address]  [address], [facility] [address]     Phone:  [phone]     amLODIPine 5 MG tablet    insulin glargine 100 UNIT/ML injection    isosorbide mononitrate 30 MG 24 hr tablet    lisinopril 20 MG tablet         Some of these will need a paper prescription and others can be bought over the counter. Ask your nurse if you have questions.     Bring a paper prescription for each of these medications     order for DME                Protect others around you: Learn how to safely use, store and throw away your medicines at www.disposemymeds.org.             Medication List: This is a list of all your medications and when to take them. Check marks below indicate your daily home schedule. Keep this list as a reference.      Medications           Morning Afternoon Evening Bedtime As Needed    amLODIPine 5 MG tablet   Commonly known as:  NORVASC   Take 1 tablet (5 mg) by mouth 2 times daily   Last time this was given:  5 mg on 4/26/2017  8:48 AM   Next Dose Due:  Today at bedtime                                      insulin glargine 100 UNIT/ML injection   Inject 20 Units Subcutaneous every 24 hours   Last time this was given:  40 Units on 4/25/2017  4:17 PM                                   isosorbide mononitrate 30 MG 24 hr tablet   Commonly known as:  IMDUR   Take 0.5 tablets (15 mg) by mouth daily   Last time this was given:  15 mg on 4/26/2017  8:48 AM   Next Dose Due:  Tomorrow morning                                   JANUVIA 50 MG tablet   Take 50 mg by mouth daily   Generic drug:  sitagliptin                                    levothyroxine 137 MCG tablet   Commonly known as:  SYNTHROID/LEVOTHROID   Take 137 mcg by mouth At Bedtime   Last time this was given:  137 mcg on 4/25/2017 10:03 PM   Next Dose Due:  Today at bedtime                                   lisinopril 20 MG tablet   Commonly known as:  PRINIVIL/ZESTRIL   Take 2 tablets (40 mg) by mouth At Bedtime   Last time this was given:  40 mg on 4/25/2017 10:03 PM   Next Dose Due:  Today at bedtime                                   metFORMIN 1000 MG tablet   Commonly known as:  GLUCOPHAGE   Take 1 tablet (1,000 mg) by mouth 2 times daily (with meals)                                      metoprolol 100 MG 24 hr tablet   Commonly known as:  TOPROL-XL   Take 1 tablet (100 mg) by mouth At Bedtime   Last time this was given:  100 mg on 4/25/2017 10:03 PM   Next Dose Due:  Today at bedtime                                   nitroglycerin 0.4 MG sublingual tablet   Commonly known as:  NITROSTAT   Place 1 tablet (0.4 mg) under the tongue every 5 minutes as needed for chest pain   Last time this was given:  0.4 mg on 4/24/2017  1:11 PM                                   ONE TOUCH LANCETS Misc   use qid                                ONE TOUCH ULTRA test strip   use qid   Generic drug:  blood glucose monitoring                                order for DME   Equipment being ordered: Cane () Treatment Diagnosis: Impaired balance                                pantoprazole 40 MG EC tablet   Commonly known as:  PROTONIX   Take 1 tablet (40 mg) by mouth every morning (before breakfast)   Last time this was given:  40 mg on 4/26/2017  6:33 AM   Next Dose Due:  Tomorrow morning                                   simvastatin 40 MG tablet   Commonly known as:  ZOCOR   Take 1 tablet (40 mg) by mouth At Bedtime   Last time this was given:  40 mg on 4/25/2017 10:03 PM   Next Dose Due:  Today at bedtime                                             More Information         Discharge Instructions for Atrial Fibrillation  You have been diagnosed with atrial fibrillation. With this condition, your heart s two upper chambers quiver rather than squeeze the blood out in a normal pattern. This leads to an irregular and sometimes rapid heartbeat. Some people will develop associated symptoms such as a flip-flopping heartbeat, lightheadedness, or shortness of breath. Other people may have no symptoms at all. Atrial fibrillation is serious because it affects the heart s ability to fill with blood as it should. Blood clots may form. This increases the risk for stroke. Untreated atrial fibrillation can also lead to heart failure. Atrial fibrillation can be controlled. With treatment, most people with atrial fibrillation lead normal lives. It is estimated that over 2.5 million Americans have atrial fibrillation.  Treatment options  Recommended treatment for atrial fibrillation depends on your age, symptoms, how long you have had atrial fibrillation, and other factors. You will have a complete evaluation to find out if you have any abnormalities that caused your heart to go into atrial fibrillation. This might be blocked heart arteries or a thyroid problem. Your doctor will assess your particular case and discuss choices with you.  Treatment choices may include:    Treating an underlying disorder that puts you at risk for atrial fibrillation. For example, correcting an abnormal thyroid or electrolyte problem, or treating a blocked heart artery.    Restoring a normal heart rhythm with an electrical shock (cardioversion) or with an antiarrhythmic medicine (chemical cardioversion)    Using medication to control your heart rate in atrial fibrillation.    Preventing the risk for blood clot and stroke using blood-thinning medicines. Your doctor will tell you what he or she recommends. Choices may include aspirin, clopidogrel, warfarin, dabigatran, rivaroxaban, or apixaban.    Doing catheter ablation  or maze procedure. These use different methods to destroy certain areas of heart tissue. This interrupts the electrical signals causing atrial fibrillation. One of these procedures may be a choice when medicines do not work.    Other treatment choices may be recommended for you by your doctor.  Managing risk factors for stroke and preventing heart failure are important parts of any treatment plan for atrial fibrillation.  Home care    Take your medicines exactly as directed. Don t skip doses.    Work with your doctor to find the right medicaines and doses for you.    Learn to take your own pulse. Keep a record of your results. Ask your doctor which pulse rates mean that you need medical attention. Slowing your pulse is often the goal of treatment. Ask your doctor if it s OK for you to use an automatic machine to check your pulse at home. Sometimes these machines don t count the pulse correctly when you have atrial fibrillation.    Limit your intake of coffee, tea, cola, and other beverages with caffeine to 2 cups per day. Talk with your doctor about whether you should eliminate caffeine.    Avoid over-the-counter medicines that have caffeine in them.    Let your doctor know what medicines you take, including prescription and over-the-counter medicines, as well as any supplements. They interfere with some medicines given for atrial fibrillation.    Ask your doctor about whether you can drink alcohol. Some people need to avoid alcohol to better treat atrial fibrillation. If you are taking blood-thinner medicines, alcohol may interfere with them by increasing their effect.    Never take stimulants such as amphetamines or cocaine. These drugs can speed up your heart rate and trigger atrial fibrillation.  Follow-up  Make a follow-up appointment as directed by our staff.     When to call your doctor  Call your doctor immediately if you have any of the following:    Weakness    Dizziness    Fainting    Fatigue    Shortness  of breath    Chest pain with increased activity    A change in the usual regularity of your heartbeat, or an unusually fast heartbeat     5471-1486 [employer]. [address] [place] [address], [place] [address]. All rights reserved. This information is not intended as a substitute for professional medical care. Always follow your healthcare professional's instructions.                Living with Atrial Fibrillation: Preventing Stroke  Atrial fibrillation (AFib) is a type of abnormal heart rhythm. The heart has 2 upper chambers called atria and 2 lower chambers called ventricles. AFib causes the atria to quiver (fibrillate) instead of pumping normally. Blood can then pool in the heart instead of moving in and out as usual. This can cause clots to form in the blood. A clot can travel to the brain and cause a stroke. A stroke can cause brain damage very quickly.    Taking medicine to prevent stroke  Your health care provider may prescribe a medicine to help prevent blood clots. This type of medicine is called a blood thinner. Blood thinners include:    Antiplatelet medicines, such as aspirin or clopidrogrel    Anticoagulation medicines, such as warfarin, dabigatran, rivaroxaban, or apixaban  Risks of blood thinner medicine  Blood thinners increase your risk of bleeding. If you take certain blood thinners, you may need to take extra steps to stay healthy. You may need regular blood tests to check the levels of medicine in your blood. You ll need to be careful to not injure yourself. And you may need to watch your diet for foods that also affecting blood clotting.  If your blood is too thin, you may have symptoms of excess bleeding, such as:    Unusual bruising    Bleeding from the gums    Blood in the urine or stool    Vomiting blood    Nosebleeds    An unusual or severe headache  Taking the right dose  You ll need to make sure to take the medicine exactly as directed by your health care provider. Take it at the  same time each day. If you miss a dose, call your provider right away to find out how much to take. Never take a double dose. If you take too much, it can cause too much bleeding. It can cause bleeding you can see, on the outside of your body. And it can cause bleeding on the inside of your body.  Getting your blood tested  Depending on which blood thinner you take, you may need to have your blood tested on a regular schedule. This is to make sure you don t have too much or too little of the medicine in your blood. Too much can cause excess bleeding. Too little may not prevent blood clots from harming you.  You may need to visit a hospital or clinic every week to have your blood tested. Or a nurse may come to your home and test your blood. In some cases, you may be able to test your blood at home with a small machine. Talk with your health care provider to find out what s best for you. After the blood test, your health care provider may tell you to change your dose of medicine.  Watching your diet  Some foods can affect how certain blood thinners work. For example, many foods contain vitamin K. Vitamin K is a substance that helps your blood clot. You don t need to avoid foods that have vitamin K. But you do need to keep the amount of them you eat steady -- about the same day to day. Examples of foods high in vitamin K are asparagus, avocado, broccoli, cabbage, kale, spinach, and some other leafy green vegetables. Oils, such as soybean, canola, and olive, are also high in vitamin K.  Other foods and drinks can affect the way blood thinners work in your body. These include:    Grapefruit and grapefruit juice    Cranberries and cranberry juice    Fish oil supplements    Garlic, isidro, licorice, and turmeric    Herbs used in herbal teas or supplements    Alcohol  If any of these items are part of your regular diet, continue using them as you normally would. Don t make any major changes in your diet without first  talking with your health care provider.  You may also need to limit fats in your diet to 2 to 4 tablespoons a day.  Preventing injury  Because blood thinners make you bleed more, you ll need to protect yourself from breaks in the skin. You ll need to:    Not go barefoot - always wear shoes    Not trim corns or calluses yourself    Use an electric razor instead of a manual one    Use a soft-bristled toothbrush and waxed dental floss  You ll also need to avoid any activities that may cause injury. If you fall or are injured, you could be bleeding inside your body and not know it. Make sure to get medical attention right away if you fall, hit your head, or have any other kind of injury.  Other safety tips  While on your medicine, be sure to:    Tell all of your health care providers that you take a blood thinner for AFib. This includes all of your doctors, dental care providers, and your pharmacist.    Ask your doctor before taking any new medicines, vitamins, or other supplements. Any of these can cause problems when you take a blood thinner.    Wear a medical alert bracelet or carry an ID card in your wallet if you will be taking blood thinners for months or longer.    Keep all appointments for your blood tests.  Other ways to help prevent stroke  Your health care provider might give you other advice about how to lower your risk for stroke, such as:    Lowering your cholesterol with lifestyle changes or medicine    Not smoking    Getting physical activity    Losing weight if needed    Eating a heart-healthy diet    Not drinking too much alcohol     When to call your health care provider  Call your health care provider right away if you have any of these:    Unusual or severe headache    Confusion, weakness, or numbness    Bleeding that won t stop    Coughing or vomiting blood    Bright red blood in the stool    Fall or injury to the head    Symptoms of atrial fibrillation that are new or getting worse     3532-9588  [employer]. [address] [place] [address], [place] [address]. All rights reserved. This information is not intended as a substitute for professional medical care. Always follow your healthcare professional's instructions.                Lifestyle Changes to Control Cholesterol  Diet, exercise, weight management, quitting smoking, stress management, and taking your medications right can help you control your cholesterol.    Diet  Your health care provider will give you information on changes to your diet you may need to make, based on your situation. Your provider may recommend that you see a registered dietitian for help with diet changes. Changes may include:    Reducing the amount of fat and cholesterol in your meals    Reducing the amount of sodium (salt) in your food, especially if you have high blood pressure    Eating more fresh vegetables and fruits    Eating lean proteins, such as fish, poultry, and legumes (beans and peas), and eating less red meat and processed meats    Using low-fat dairy products    Using vegetable and nut oils in limited amounts    Limiting how many sweets and processed foods like chips, cookies, and baked goods that you eat   Exercise  Regular exercise is a good way to help your body control cholesterol. Regular exercise has many benefits. It can:    Raise your good cholesterol.    Help lower your bad cholesterol.    Let blood flow better through your body.    Give more oxygen to your muscles and tissues.    Help you manage your weight.  Your health care provider may recommend that you get more physical activity if you haven't been active. Depending on your situation, your provider may recommend that you get moderate to vigorous physical activity for at least 40 minutes each day and for at least 3 to 4 days each week. A few examples of moderate to vigorous activity include:    Walking at a brisk pace, about 3 to 4 miles per hour    Jogging or running    Swimming or water  aerobics    Hiking    Dancing    Martial arts    Tennis    Riding a bicycle or stationary bike    Dancing  Weight management  If you are overweight or obese, your health care provider will work with you to help you lose weight and lower your BMI (body mass index) to a normal or near-normal level. Making diet changes and getting more physical activity can help.    Quitting smoking  Smoking and other tobacco use can raise cholesterol and make it harder to control. Quitting is tough. But millions of people have given up tobacco for good. You can quit, too! Think about some of the reasons below to quit smoking. Do any of them make you think twice about your smoking habit?  Stop smoking because it:    Keeps your cholesterol high, even if you make all the other changes you re supposed to.    Damages your body, especially your heart, lungs, and blood vessels.    Makes you more likely to have a heart attack (also known as acute myocardial infarction, or AMI), stroke, or cancer.    Stains your teeth and makes your skin, clothes, and breath smell bad.    Costs a lot of money.  Stress   Learn stress-management techniques to help you deal with stress in your home and work life.   Making the most of medications  Healthy eating and exercise are a good start to keeping your cholesterol down. But you may need some extra help from medication. If your doctor prescribes medication, be sure to take it exactly as directed. Remember:    Tell your doctor about all other medications you take, including vitamins and herbs.    Tell your doctor if you have any side effects after starting to take a medication. Examples of side effects to watch for include: muscle aches, weakness, blurred vision, rust-colored urine, yellowing of eyes or skin (jaundice), or headache.    Don t skip a dose or stop taking your medication because you feel better or because your cholesterol numbers go down. Never stop taking your medication unless your doctor has  told you it s OK.    9616-7129 [employer]. [address] [place] [address]. All rights reserved. This information is not intended as a substitute for professional medical care. Always follow your healthcare professional's instructions.                Low-Fat Cooking Tips  To eat less fat, you may need to learn some new ways to cook. But that doesn't mean you have to eat bland, boring food. And it doesn't mean cooking needs to take any more time. Here are some tips for cooking and seasoning foods with less fat.     Broil fish instead of frying it. And sprinkle on herbs to add flavor.    Try New Cooking Methods    Broil, roast, bake, steam, or microwave fish, chicken, turkey, and meat.    Remove skin from chicken and turkey and trim extra fat from meat before cooking.    Sprinkle herbs on meat, chicken, and fish, and in soups.    Cook in broth instead of fat.    Use nonstick cooking sprays or nonstick pans.    Steam or microwave vegetables without adding fat. Serve with herbs, lemon juice, vinegar, or fat-free butter-flavored powder.    To flavor beans and rice, add chopped onions, garlic, and peppers.    Chill soups and stews. Before reheating and serving, skim off the fat.    When you add fat, use canola or olive oil instead of butter or lard.  Lighten Up Your Recipes    In soups and sauces: Replace whole milk or cream with low-fat milk, evaporated fat-free milk, or nonfat dry milk.    In puddings and other desserts: Replace whole milk or cream with low-fat milk or fat-free condensed milk.    To make dips and toppings: Use low-fat or nonfat cottage cheese or sour cream.    To make salad dressings: Use nonfat yogurt or low-fat buttermilk.    In place of 1 whole egg in recipes: Use 2 egg whites or 1/4 cup egg substitute.    In place of regular cheese: Use fat-free or reduced-fat cheese.    6635-7608 [employer]. [address] [place] [address]. All rights reserved. This  information is not intended as a substitute for professional medical care. Always follow your healthcare professional's instructions.                Diet: Low Cholesterol  Cholesterol is needed by the body to build new cells and create certain hormones. There are 2 kinds of cholesterol in the blood:      HDL, or  good  cholesterol, prevents fat deposits (plaque) from building up in the arteries. In this way it protects against heart disease and stroke.    LDL,  bad  cholesterol, stays in the body and sticks to artery walls. It may eventually block blood flow to the heart and brain causing heart attack or stroke.  Seventy-five percent of the body s cholesterol is made in the liver. Only 25% of the body s cholesterol comes from the food you eat. While the amount of cholesterol in your diet should be limited, it is the cholesterol that your body makes that creates the greatest disease risk. The biggest influence on cholesterol made by your body is the mixture of fat types in your diet. There are 2 kinds of fats you can eat:     Good fats  are the unsaturated fats (mono-unsaturated and poly-unsaturated). They raise the level of good cholesterol and lower the level of bad cholesterol. Good fats are found in vegetable oils such as olive, sunflower, corn and soybean oils, and in nuts and seeds.     Bad fats  are the saturated fats (including foods high in cholesterol) and  trans  fats. These increase the risk of disease. They lower the good cholesterol and raise the level of bad cholesterol. Bad fats are found in animal products, including meat, whole-milk dairy products, and butter. Some plants are also high in bad fats (coconut and palm plants). Trans fats are found in hard (stick) margarines and many fast foods and commercially baked goods. Soft margarine sold in tubs has fewer trans fats and is safer to use.  High blood cholesterol is usually a due to a diet high in saturated fat combined with an inactive lifestyle. In  some cases, genetics plays a role in causing high cholesterol. The following tips will help you create healthy eating habits that will help lower your blood cholesterol level.  Create a diet high in good fat, low in bad fats (and low in cholesterol)  The following steps will help you create a diet high in good fats and low in bad fats:  1. Talk with your doctor before starting a low cholesterol diet or weight loss program.  2. Learn to read nutrition labels and select appropriate portion sizes.  3. When cooking, use plant-based unsaturated vegetable oils (sunflower, corn, soybean, canola, peanut, and olive oils).  4. Avoid saturated fats found in animal products such as meat, dairy (whole-milk, cheese and ice cream), poultry skin, and egg yolks. Plants high in saturated oils include coconut oil, palm oil, and palm kernel oil.  5. If you eat meat, choose smaller portions and lean cuts, such as round, kina, sirloin, or loin. Eat more meatless meals.  6. Replace meat with fish at least 2 times a week. Fish is an important source of the unsaturated fat called omega-3 fatty acids. This fat has potential to lower the risk of heart disease.  7. Replace whole-milk dairy products with low-fat or nonfat products. Try soy products. Soy helps to reduce total cholesterol.  8. Supplement your diet with protective fibers. Eat nuts, seeds, and whole grains rather than white rice and bread. These foods lower both cholesterol and triglyceride levels. (Triglycerides are another fat found in the blood.) Walnuts are one of the best sources of omega-3 fatty acids.  9. Eat plenty of fresh fruits and vegetables daily.  10. Avoid fast foods and commercial baked goods. Assume they contain saturated fat unless labeled otherwise.    9354-4987 [employer]. [address] [place] [address], [place] [address]. All rights reserved. This information is not intended as a substitute for professional medical care. Always follow your healthcare  professional's instructions.                Patient Education    Isosorbide Mononitrate Oral tablet    Isosorbide Mononitrate Oral tablet, extended-release  Isosorbide Mononitrate Oral tablet, extended-release  What is this medicine?  ISOSORBIDE MONONITRATE (eye mathew SOR bide mon oh SHEILA trate) is a vasodilator. It relaxes blood vessels, increasing the blood and oxygen supply to your heart. This medicine is used to prevent chest pain caused by angina. It will not help to stop an episode of chest pain.  This medicine may be used for other purposes; ask your health care provider or pharmacist if you have questions.  What should I tell my health care provider before I take this medicine?  They need to know if you have any of these conditions:    previous heart attack or heart failure    an unusual or allergic reaction to isosorbide mononitrate, nitrates, other medicines, foods, dyes, or preservatives    pregnant or trying to get pregnant    breast-feeding  How should I use this medicine?  Take this medicine by mouth with a glass of water. Follow the directions on the prescription label. Do not crushor chew. Take your medicine at regular intervals. Do not take your medicine more often than directed. Do not stop taking this medicine except on the advice of your doctor or health care professional.  Talk to your pediatrician regarding the use of this medicine in children. Special care may be needed.  Overdosage: If you think you have taken too much of this medicine contact a poison control center or emergency room at once.  NOTE: This medicine is only for you. Do not share this medicine with others.  What if I miss a dose?  If you miss a dose, take it as soon as you can. If it is almost time for your next dose, take only that dose. Do not take double or extra doses.  What may interact with this medicine?  Do not take this medicine with any of the following medications:    medicines used to treat erectile dysfunction (ED)  like avanafil, sildenafil, tadalafil, and vardenafil    riociguat  This medicine may also interact with the following medications:    medicines for high blood pressure    other medicines for angina or heart failure  This list may not describe all possible interactions. Give your health care provider a list of all the medicines, herbs, non-prescription drugs, or dietary supplements you use. Also tell them if you smoke, drink alcohol, or use illegal drugs. Some items may interact with your medicine.  What should I watch for while using this medicine?  Check your heart rate and blood pressure regularly while you are taking this medicine. Ask your doctor or health care professional what your heart rate and blood pressure should be and when you should contact him or her. Tell your doctor or health care professional if you feel your medicine is no longer working.  You may get dizzy. Do not drive, use machinery, or do anything that needs mental alertness until you know how this medicine affects you. To reduce the risk of dizzy or fainting spells, do not sit or stand up quickly, especially if you are an older patient. Alcohol can make you more dizzy, and increase flushing and rapid heartbeats. Avoid alcoholic drinks.  Do not treat yourself for coughs, colds, or pain while you are taking this medicine without asking your doctor or health care professional for advice. Some ingredients may increase your blood pressure.  What side effects may I notice from receiving this medicine?  Side effects that you should report to your doctor or health care professional as soon as possible:    bluish discoloration of lips, fingernails, or palms of hands    irregular heartbeat, palpitations    low blood pressure    nausea, vomiting    persistent headache    unusually weak or tired  Side effects that usually do not require medical attention (report to your doctor or health care professional if they continue or are bothersome):    flushing  of the face or neck    rash  This list may not describe all possible side effects. Call your doctor for medical advice about side effects. You may report side effects to FDA at 1-174-CXV-4219.  Where should I keep my medicine?  Keep out of the reach of children.  Store between 15 and 30 degrees C (59 and 86 degrees F). Keep container tightly closed. Throw away any unused medicine after the expiration date.  NOTE:This sheet is a summary. It may not cover all possible information. If you have questions about this medicine, talk to your doctor, pharmacist, or health care provider. Copyright  2016 [employer]

## 2017-04-20 NOTE — LETTER
Transition Communication Hand-off for Care Transitions to Next Level of Care Provider    Name: Jayro Elder  MRN #: [id number]  Primary Care Provider: Davion Cordova  Primary Care MD Name: clementine JAVIER  Primary Clinic: [facility] [place] [address]  [place] [address]  Primary Care Clinic Name:  [place]  Reason for Hospitalization:  Hemorrhagic stroke (H) [I61.9]  Admit Date/Time: 4/20/2017  9:45 PM  Discharge Date: 4/26/2017  Payor Source: Payor: BCBS / Plan: [place] / Product Type: Indemnity /     Readmission Assessment Measure (ANDRIA) Risk Score/category: average but should be elevated or high    Plan of Care Goals/Milestone Events:   Patient Concerns: insurance copays, feels frustrated by multiple PCPs, can't drive d/t vision field cut           Reason for Communication Hand-off Referral: Fragility  No support or lacking a support system  Multiple providers/specialties  Non-adherence to plan of care related to:  Other quit taking insulin in recent past, doesn't follow MD directions    Discharge Plan:  Discharge Plan:       Most Recent Value    Concerns To Be Addressed adjustment to diagnosis/illness concerns, compliance issue concerns           Concern for non-adherence with plan of care:    yes  Discharge Needs Assessment:  Needs       Most Recent Value    Equipment Currently Used at Home none [has a walker if needed]    Transportation Available taxi    # of Referrals Placed by [facility] Care Coordination, Inpatient Pharmacy, MTM, Specialty Providers, Transportation, Community Resources, Scheduled Follow-up appointments, UMP          Follow-up specialty is recommended: Yes    Follow-up plan:  Future Appointments  Date Time Provider Department Center   5/16/2017 1:30 PM Gloria [facility] Neurology [facility]   4/28/2017 2:00 PM Davion Cordova PA-C [facility]   5/24/2017 1:50 PM Anni Bradley, APRN CNP [facility]       Any outstanding  tests or procedures:  may need f/u head CT in a month      Referrals     Future Labs/Procedures    Med Therapy Manage Referral     Comments:    Your provider has referred you to: **[place] [facility] Scheduling (numerous locations) [phone]   [email or web address]  FMG: [facility] [phone]   [email or web address]    Reason for Referral: transition of care    The [place] [facility] department will contact you to schedule an appointment.  You may also schedule the appointment by calling [phone].  For [place] patients, please call [phone] to confirm/schedule your appointment on the next business day.    This service is designed to help you get the most from your medications.  A specially trained Pharmacist will work closely with you and your providers to solve any questions, concerns, issues or problems related to your medications.    Please bring all of your prescription and non-prescription medications (such as vitamins, over-the-counter medications, and herbals) or a detailed medication list to your appointment.    If you have a glucose meter or other home monitoring information, please also bring this to your appointment (i.e. blood glucose log, blood pressure log, pain log, etc.).    Occupational Therapy Referral     Comments:    *This therapy referral will be filtered to a centralized scheduling office at [facility] and the patient will receive a call to schedule an appointment at a [place] location most convenient for them. *     [facility] provides Occupational Therapy evaluation and treatment and many specialty services across the [place] system.  If requesting a specialty program, please choose from the list below.    If you have not heard from the scheduling office within 2  business days, please call [phone] for all locations, with the exception of [place], please call [phone].     Treatment: Evaluation & Treatment  Special Instructions/Modalities: Stroke and vision loss  Special Programs:Stroke and vision loss    Please be aware that coverage of these services is subject to the terms and limitations of your health insurance plan.  Call member services at your health plan with any benefit or coverage questions.      **Note to Provider:  If you are referring outside of [place] for the therapy appointment, please list the name of the location in the  special instructions  above, print the referral and give to the patient to schedule the appointment.    Physical Therapy Referral     Comments:    *This therapy referral will be filtered to a centralized scheduling office at [facility] and the patient will receive a call to schedule an appointment at a [place] location most convenient for them. *     [facility] provides Physical Therapy evaluation and treatment and many specialty services across the [place] system.  If requesting a specialty program, please choose from the list below.    If you have not heard from the scheduling office within 2 business days, please call [phone] for all locations, with the exception of [place], please call [phone].  Treatment: Evaluation & Treatment  Special Instructions/Modalities: Stroke  Special Programs: Stroke    Please be aware that coverage of these services is subject to the terms and limitations of your health insurance plan.  Call member services at your health plan with any benefit or coverage questions.      **Note to Provider:  If you are referring outside of [place] for the therapy appointment, please list the name of the location in the  special instructions  above, print the referral and give to the patient to schedule the appointment.    Speech Therapy Referral     Comments:     "*This therapy referral will be filtered to a centralized scheduling office at [facility] and the patient will receive a call to schedule an appointment at a [place] location most convenient for them. *     [facility] provides Speech Therapy evaluation and treatment and many specialty services across the [place] system.  If requesting a specialty program, please choose from the list below.  If you have not heard from the scheduling office within 2 business days, please call [phone] for all locations, with the exception of [place], please call [phone].       Treatment: Evaluation & Treatment  Speech Treatment Diagnosis: Dysarthria  Special Instructions: Stroke and dysarthria    Please be aware that coverage of these services is subject to the terms and limitations of your health insurance plan.  Call member services at your health plan with any benefit or coverage questions.      **Note to Provider:  If you are referring outside of [place] for the therapy appointment, please list the name of the location in the  special instructions  above, print the referral and give to the patient to schedule the appointment.            Key Recommendations:    Identified issues/concerns regarding health management: Uncontrolled DM, new stroke requiring OP PT/OT/SLP, new AF but unable to start anticoagulant for a month until his brain hemorrhagic conversation resolves. He has a fair amount of distrust of the medical field and has been to a couple of different clinics with multiple PCPs.   He has foot ulcers but didn't want to quit wearing his compression stockings when a PCP told him to stop because not wearing them makes his ankles swell. Explained that compression can compromise circulation and that MD probably didn't want him to wear them so that they would heal better. States \"She never told me that.\" States one PCP sent him home with percocet when he was having a heart " "attack. States another PCP \"banged on his desk so hard things fell off it and he yelled at me\". Discussed importance of having a PCP he can trust and he would like to try Dr Lomeli at [facility]. She doesn't have immediate openings for hospital f/u but he will see Davion JAVIER on 4/28 in the meantime. He also needs f/u with cardiology in 6 weeks so appt made with [facility]. He needs to see neurology to see if he is ok to start NOAC in a month and appt made for him with [facility] for that.   Pt needs PT/OT/SLP. His wife works full time during the day. Discussed if other family or friends could take him to appts. He states no. Discussed taxis, Uber rides, etc. Pt is not excited about this. Pt has [employer] application and felt he could use that but explained that this would take at least a few weeks to get approval. Talked with his wife by phone. She works 8:30a to 6p. She states they don't have family or friends that could help as children work. Discussed possibility of home care but pt isn't and doesn't want to be homebound. He is afraid to drive d/t his field cut and he is not cleared to drive but wife states that he would be \"unbearable\" if he couldn't go out to eat, etc. Discussed options of scheduling therapies together and if family could take time off a little early or go in late and use Uber at other times until [employer] kicks in. She thinks they can work something out. Explained the importance of early therapy for strokes and pt understands this. Consulted with  for other transportation options but none available.  Pt is frustrated by what he perceives as poor quality medical care.  He can become non-compliant if things are not explained very clearly to him.  He expresses a desire to improve his health, he seems to eat very healthy, but he seems to sabotage his care when he feels it isn't going well.  He would benefit from close clinic care coordination follow up, especially to make " sure he gets [employer] filled out ASAP.  Trupti Rivera    AVS/Discharge Summary is the source of truth; this is a helpful guide for improved communication of patient story

## 2017-04-21 ENCOUNTER — APPOINTMENT (OUTPATIENT)
Dept: ULTRASOUND IMAGING | Facility: CLINIC | Age: 67
DRG: 065 | End: 2017-04-21
Attending: HOSPITALIST
Payer: COMMERCIAL

## 2017-04-21 ENCOUNTER — APPOINTMENT (OUTPATIENT)
Dept: SPEECH THERAPY | Facility: CLINIC | Age: 67
DRG: 065 | End: 2017-04-21
Attending: INTERNAL MEDICINE
Payer: COMMERCIAL

## 2017-04-21 ENCOUNTER — APPOINTMENT (OUTPATIENT)
Dept: OCCUPATIONAL THERAPY | Facility: CLINIC | Age: 67
DRG: 065 | End: 2017-04-21
Attending: INTERNAL MEDICINE
Payer: COMMERCIAL

## 2017-04-21 ENCOUNTER — APPOINTMENT (OUTPATIENT)
Dept: CARDIOLOGY | Facility: CLINIC | Age: 67
DRG: 065 | End: 2017-04-21
Attending: INTERNAL MEDICINE
Payer: COMMERCIAL

## 2017-04-21 LAB
ANION GAP SERPL CALCULATED.3IONS-SCNC: 9 MMOL/L (ref 3–14)
BUN SERPL-MCNC: 13 MG/DL (ref 7–30)
CALCIUM SERPL-MCNC: 8.4 MG/DL (ref 8.5–10.1)
CHLORIDE SERPL-SCNC: 105 MMOL/L (ref 94–109)
CHOLEST SERPL-MCNC: 118 MG/DL
CO2 SERPL-SCNC: 26 MMOL/L (ref 20–32)
CREAT SERPL-MCNC: 0.72 MG/DL (ref 0.66–1.25)
CRP SERPL-MCNC: 4.5 MG/L (ref 0–8)
DEPRECATED CALCIDIOL+CALCIFEROL SERPL-MC: 21 UG/L (ref 20–75)
ERYTHROCYTE [DISTWIDTH] IN BLOOD BY AUTOMATED COUNT: 13.1 % (ref 10–15)
GFR SERPL CREATININE-BSD FRML MDRD: ABNORMAL ML/MIN/1.7M2
GLUCOSE BLDC GLUCOMTR-MCNC: 106 MG/DL (ref 70–99)
GLUCOSE BLDC GLUCOMTR-MCNC: 111 MG/DL (ref 70–99)
GLUCOSE BLDC GLUCOMTR-MCNC: 129 MG/DL (ref 70–99)
GLUCOSE BLDC GLUCOMTR-MCNC: 146 MG/DL (ref 70–99)
GLUCOSE BLDC GLUCOMTR-MCNC: 164 MG/DL (ref 70–99)
GLUCOSE BLDC GLUCOMTR-MCNC: 179 MG/DL (ref 70–99)
GLUCOSE BLDC GLUCOMTR-MCNC: 199 MG/DL (ref 70–99)
GLUCOSE BLDC GLUCOMTR-MCNC: 250 MG/DL (ref 70–99)
GLUCOSE SERPL-MCNC: 233 MG/DL (ref 70–99)
HCT VFR BLD AUTO: 39.4 % (ref 40–53)
HDLC SERPL-MCNC: 51 MG/DL
HGB BLD-MCNC: 13.3 G/DL (ref 13.3–17.7)
LDLC SERPL CALC-MCNC: 55 MG/DL
MCH RBC QN AUTO: 28.8 PG (ref 26.5–33)
MCHC RBC AUTO-ENTMCNC: 33.8 G/DL (ref 31.5–36.5)
MCV RBC AUTO: 85 FL (ref 78–100)
NONHDLC SERPL-MCNC: 67 MG/DL
PLATELET # BLD AUTO: 194 10E9/L (ref 150–450)
POTASSIUM SERPL-SCNC: 3.3 MMOL/L (ref 3.4–5.3)
POTASSIUM SERPL-SCNC: 3.6 MMOL/L (ref 3.4–5.3)
RBC # BLD AUTO: 4.62 10E12/L (ref 4.4–5.9)
SODIUM SERPL-SCNC: 140 MMOL/L (ref 133–144)
TRIGL SERPL-MCNC: 59 MG/DL
TROPONIN I SERPL-MCNC: NORMAL UG/L (ref 0–0.04)
TROPONIN I SERPL-MCNC: NORMAL UG/L (ref 0–0.04)
TSH SERPL DL<=0.005 MIU/L-ACNC: 3.28 MU/L (ref 0.4–4)
WBC # BLD AUTO: 6 10E9/L (ref 4–11)

## 2017-04-21 PROCEDURE — 99223 1ST HOSP IP/OBS HIGH 75: CPT | Mod: AI | Performed by: INTERNAL MEDICINE

## 2017-04-21 PROCEDURE — 84132 ASSAY OF SERUM POTASSIUM: CPT | Performed by: HOSPITALIST

## 2017-04-21 PROCEDURE — 25500064 ZZH RX 255 OP 636: Performed by: INTERNAL MEDICINE

## 2017-04-21 PROCEDURE — 97112 NEUROMUSCULAR REEDUCATION: CPT | Mod: GO

## 2017-04-21 PROCEDURE — 82306 VITAMIN D 25 HYDROXY: CPT | Performed by: INTERNAL MEDICINE

## 2017-04-21 PROCEDURE — 00000146 ZZHCL STATISTIC GLUCOSE BY METER IP

## 2017-04-21 PROCEDURE — 20000003 ZZH R&B ICU

## 2017-04-21 PROCEDURE — 76536 US EXAM OF HEAD AND NECK: CPT

## 2017-04-21 PROCEDURE — 36415 COLL VENOUS BLD VENIPUNCTURE: CPT | Performed by: HOSPITALIST

## 2017-04-21 PROCEDURE — 25000128 H RX IP 250 OP 636: Performed by: INTERNAL MEDICINE

## 2017-04-21 PROCEDURE — A9585 GADOBUTROL INJECTION: HCPCS | Performed by: EMERGENCY MEDICINE

## 2017-04-21 PROCEDURE — 25000128 H RX IP 250 OP 636: Performed by: EMERGENCY MEDICINE

## 2017-04-21 PROCEDURE — 36415 COLL VENOUS BLD VENIPUNCTURE: CPT | Performed by: INTERNAL MEDICINE

## 2017-04-21 PROCEDURE — 99214 OFFICE O/P EST MOD 30 MIN: CPT

## 2017-04-21 PROCEDURE — 25000131 ZZH RX MED GY IP 250 OP 636 PS 637: Performed by: INTERNAL MEDICINE

## 2017-04-21 PROCEDURE — 99222 1ST HOSP IP/OBS MODERATE 55: CPT | Performed by: NEUROLOGICAL SURGERY

## 2017-04-21 PROCEDURE — 40000225 ZZH STATISTIC SLP WARD VISIT: Performed by: SPEECH-LANGUAGE PATHOLOGIST

## 2017-04-21 PROCEDURE — 93306 TTE W/DOPPLER COMPLETE: CPT | Mod: 26 | Performed by: INTERNAL MEDICINE

## 2017-04-21 PROCEDURE — 40000133 ZZH STATISTIC OT WARD VISIT

## 2017-04-21 PROCEDURE — 97165 OT EVAL LOW COMPLEX 30 MIN: CPT | Mod: GO

## 2017-04-21 PROCEDURE — 40000264 ECHO COMPLETE BUBBLE STUDY WITH OPTISON

## 2017-04-21 PROCEDURE — 96365 THER/PROPH/DIAG IV INF INIT: CPT

## 2017-04-21 PROCEDURE — 86140 C-REACTIVE PROTEIN: CPT | Performed by: INTERNAL MEDICINE

## 2017-04-21 PROCEDURE — 84443 ASSAY THYROID STIM HORMONE: CPT | Performed by: INTERNAL MEDICINE

## 2017-04-21 PROCEDURE — 80048 BASIC METABOLIC PNL TOTAL CA: CPT | Performed by: INTERNAL MEDICINE

## 2017-04-21 PROCEDURE — 96375 TX/PRO/DX INJ NEW DRUG ADDON: CPT

## 2017-04-21 PROCEDURE — 25000132 ZZH RX MED GY IP 250 OP 250 PS 637

## 2017-04-21 PROCEDURE — 85027 COMPLETE CBC AUTOMATED: CPT | Performed by: INTERNAL MEDICINE

## 2017-04-21 PROCEDURE — A9270 NON-COVERED ITEM OR SERVICE: HCPCS | Mod: GY | Performed by: HOSPITALIST

## 2017-04-21 PROCEDURE — 92526 ORAL FUNCTION THERAPY: CPT | Mod: GN | Performed by: SPEECH-LANGUAGE PATHOLOGIST

## 2017-04-21 PROCEDURE — 25000132 ZZH RX MED GY IP 250 OP 250 PS 637: Mod: GY | Performed by: HOSPITALIST

## 2017-04-21 PROCEDURE — 84484 ASSAY OF TROPONIN QUANT: CPT | Performed by: INTERNAL MEDICINE

## 2017-04-21 PROCEDURE — 80061 LIPID PANEL: CPT | Performed by: INTERNAL MEDICINE

## 2017-04-21 PROCEDURE — 92610 EVALUATE SWALLOWING FUNCTION: CPT | Mod: GN | Performed by: SPEECH-LANGUAGE PATHOLOGIST

## 2017-04-21 PROCEDURE — 97535 SELF CARE MNGMENT TRAINING: CPT | Mod: GO

## 2017-04-21 PROCEDURE — 25000131 ZZH RX MED GY IP 250 OP 636 PS 637: Mod: GY | Performed by: HOSPITALIST

## 2017-04-21 PROCEDURE — 99222 1ST HOSP IP/OBS MODERATE 55: CPT | Performed by: INTERNAL MEDICINE

## 2017-04-21 PROCEDURE — 25500064 ZZH RX 255 OP 636: Performed by: EMERGENCY MEDICINE

## 2017-04-21 PROCEDURE — 25000125 ZZHC RX 250

## 2017-04-21 RX ORDER — LISINOPRIL 20 MG/1
20 TABLET ORAL AT BEDTIME
Status: DISCONTINUED | OUTPATIENT
Start: 2017-04-21 | End: 2017-04-23

## 2017-04-21 RX ORDER — HYDROMORPHONE HYDROCHLORIDE 1 MG/ML
0.5 INJECTION, SOLUTION INTRAMUSCULAR; INTRAVENOUS; SUBCUTANEOUS
Status: DISCONTINUED | OUTPATIENT
Start: 2017-04-21 | End: 2017-04-21

## 2017-04-21 RX ORDER — ONDANSETRON 4 MG/1
4 TABLET, ORALLY DISINTEGRATING ORAL EVERY 6 HOURS PRN
Status: DISCONTINUED | OUTPATIENT
Start: 2017-04-21 | End: 2017-04-26 | Stop reason: HOSPADM

## 2017-04-21 RX ORDER — ONDANSETRON 2 MG/ML
INJECTION INTRAMUSCULAR; INTRAVENOUS
Status: DISCONTINUED
Start: 2017-04-21 | End: 2017-04-21 | Stop reason: HOSPADM

## 2017-04-21 RX ORDER — PROCHLORPERAZINE 25 MG
12.5 SUPPOSITORY, RECTAL RECTAL EVERY 12 HOURS PRN
Status: DISCONTINUED | OUTPATIENT
Start: 2017-04-21 | End: 2017-04-26 | Stop reason: HOSPADM

## 2017-04-21 RX ORDER — METOPROLOL SUCCINATE 100 MG/1
100 TABLET, EXTENDED RELEASE ORAL AT BEDTIME
Status: DISCONTINUED | OUTPATIENT
Start: 2017-04-21 | End: 2017-04-26 | Stop reason: HOSPADM

## 2017-04-21 RX ORDER — POTASSIUM CL/LIDO/0.9 % NACL 10MEQ/0.1L
10 INTRAVENOUS SOLUTION, PIGGYBACK (ML) INTRAVENOUS
Status: DISCONTINUED | OUTPATIENT
Start: 2017-04-21 | End: 2017-04-22

## 2017-04-21 RX ORDER — POTASSIUM CHLORIDE 1500 MG/1
20-40 TABLET, EXTENDED RELEASE ORAL
Status: DISCONTINUED | OUTPATIENT
Start: 2017-04-21 | End: 2017-04-22

## 2017-04-21 RX ORDER — LEVOTHYROXINE SODIUM 137 UG/1
137 TABLET ORAL AT BEDTIME
Status: DISCONTINUED | OUTPATIENT
Start: 2017-04-21 | End: 2017-04-26 | Stop reason: HOSPADM

## 2017-04-21 RX ORDER — METOCLOPRAMIDE HYDROCHLORIDE 5 MG/ML
5 INJECTION INTRAMUSCULAR; INTRAVENOUS EVERY 6 HOURS PRN
Status: DISCONTINUED | OUTPATIENT
Start: 2017-04-21 | End: 2017-04-26 | Stop reason: HOSPADM

## 2017-04-21 RX ORDER — SODIUM CHLORIDE 9 MG/ML
INJECTION, SOLUTION INTRAVENOUS CONTINUOUS
Status: DISCONTINUED | OUTPATIENT
Start: 2017-04-21 | End: 2017-04-22

## 2017-04-21 RX ORDER — POTASSIUM CHLORIDE 7.45 MG/ML
10 INJECTION INTRAVENOUS
Status: DISCONTINUED | OUTPATIENT
Start: 2017-04-21 | End: 2017-04-22

## 2017-04-21 RX ORDER — DEXTROSE MONOHYDRATE 25 G/50ML
25-50 INJECTION, SOLUTION INTRAVENOUS
Status: DISCONTINUED | OUTPATIENT
Start: 2017-04-21 | End: 2017-04-26 | Stop reason: HOSPADM

## 2017-04-21 RX ORDER — NICOTINE POLACRILEX 4 MG
15-30 LOZENGE BUCCAL
Status: DISCONTINUED | OUTPATIENT
Start: 2017-04-21 | End: 2017-04-22

## 2017-04-21 RX ORDER — AMLODIPINE BESYLATE 5 MG/1
5 TABLET ORAL DAILY
Status: DISCONTINUED | OUTPATIENT
Start: 2017-04-21 | End: 2017-04-22

## 2017-04-21 RX ORDER — ACETAMINOPHEN 325 MG/1
TABLET ORAL
Status: COMPLETED
Start: 2017-04-21 | End: 2017-04-21

## 2017-04-21 RX ORDER — HYDROMORPHONE HYDROCHLORIDE 1 MG/ML
0.2 INJECTION, SOLUTION INTRAMUSCULAR; INTRAVENOUS; SUBCUTANEOUS
Status: DISCONTINUED | OUTPATIENT
Start: 2017-04-21 | End: 2017-04-26 | Stop reason: HOSPADM

## 2017-04-21 RX ORDER — ONDANSETRON 2 MG/ML
4 INJECTION INTRAMUSCULAR; INTRAVENOUS EVERY 6 HOURS PRN
Status: DISCONTINUED | OUTPATIENT
Start: 2017-04-21 | End: 2017-04-26 | Stop reason: HOSPADM

## 2017-04-21 RX ORDER — GADOBUTROL 604.72 MG/ML
10 INJECTION INTRAVENOUS ONCE
Status: COMPLETED | OUTPATIENT
Start: 2017-04-21 | End: 2017-04-21

## 2017-04-21 RX ORDER — SIMVASTATIN 40 MG
40 TABLET ORAL AT BEDTIME
Status: DISCONTINUED | OUTPATIENT
Start: 2017-04-21 | End: 2017-04-26 | Stop reason: HOSPADM

## 2017-04-21 RX ORDER — POTASSIUM CHLORIDE 29.8 MG/ML
20 INJECTION INTRAVENOUS
Status: DISCONTINUED | OUTPATIENT
Start: 2017-04-21 | End: 2017-04-22

## 2017-04-21 RX ORDER — LABETALOL HYDROCHLORIDE 5 MG/ML
10-40 INJECTION, SOLUTION INTRAVENOUS EVERY 10 MIN PRN
Status: DISCONTINUED | OUTPATIENT
Start: 2017-04-21 | End: 2017-04-22

## 2017-04-21 RX ORDER — NALOXONE HYDROCHLORIDE 0.4 MG/ML
.1-.4 INJECTION, SOLUTION INTRAMUSCULAR; INTRAVENOUS; SUBCUTANEOUS
Status: DISCONTINUED | OUTPATIENT
Start: 2017-04-21 | End: 2017-04-26 | Stop reason: HOSPADM

## 2017-04-21 RX ORDER — METOCLOPRAMIDE 5 MG/1
5 TABLET ORAL EVERY 6 HOURS PRN
Status: DISCONTINUED | OUTPATIENT
Start: 2017-04-21 | End: 2017-04-26 | Stop reason: HOSPADM

## 2017-04-21 RX ORDER — PANTOPRAZOLE SODIUM 40 MG/1
40 TABLET, DELAYED RELEASE ORAL
Status: DISCONTINUED | OUTPATIENT
Start: 2017-04-21 | End: 2017-04-26 | Stop reason: HOSPADM

## 2017-04-21 RX ORDER — POTASSIUM CHLORIDE 1.5 G/1.58G
20-40 POWDER, FOR SOLUTION ORAL
Status: DISCONTINUED | OUTPATIENT
Start: 2017-04-21 | End: 2017-04-22

## 2017-04-21 RX ORDER — DEXTROSE MONOHYDRATE 25 G/50ML
25-50 INJECTION, SOLUTION INTRAVENOUS
Status: DISCONTINUED | OUTPATIENT
Start: 2017-04-21 | End: 2017-04-22

## 2017-04-21 RX ORDER — INSULIN GLARGINE 100 [IU]/ML
40 INJECTION, SOLUTION SUBCUTANEOUS EVERY 24 HOURS
Status: ON HOLD | COMMUNITY
End: 2017-04-26

## 2017-04-21 RX ORDER — HYDRALAZINE HYDROCHLORIDE 20 MG/ML
10-20 INJECTION INTRAMUSCULAR; INTRAVENOUS
Status: DISCONTINUED | OUTPATIENT
Start: 2017-04-21 | End: 2017-04-22

## 2017-04-21 RX ORDER — ACETAMINOPHEN 325 MG/1
650 TABLET ORAL EVERY 4 HOURS PRN
Status: DISCONTINUED | OUTPATIENT
Start: 2017-04-21 | End: 2017-04-26 | Stop reason: HOSPADM

## 2017-04-21 RX ORDER — PROCHLORPERAZINE MALEATE 5 MG
5 TABLET ORAL EVERY 6 HOURS PRN
Status: DISCONTINUED | OUTPATIENT
Start: 2017-04-21 | End: 2017-04-26 | Stop reason: HOSPADM

## 2017-04-21 RX ORDER — NICOTINE POLACRILEX 4 MG
15-30 LOZENGE BUCCAL
Status: DISCONTINUED | OUTPATIENT
Start: 2017-04-21 | End: 2017-04-26 | Stop reason: HOSPADM

## 2017-04-21 RX ADMIN — NICARDIPINE HYDROCHLORIDE 2.5 MG/HR: 0.2 INJECTION, SOLUTION INTRAVENOUS at 02:56

## 2017-04-21 RX ADMIN — SODIUM CHLORIDE: 9 INJECTION, SOLUTION INTRAVENOUS at 23:53

## 2017-04-21 RX ADMIN — ACETAMINOPHEN 650 MG: 325 TABLET, FILM COATED ORAL at 09:19

## 2017-04-21 RX ADMIN — GADOBUTROL 10 ML: 604.72 INJECTION INTRAVENOUS at 00:33

## 2017-04-21 RX ADMIN — ACETAMINOPHEN 650 MG: 325 TABLET, FILM COATED ORAL at 17:35

## 2017-04-21 RX ADMIN — HUMAN ALBUMIN MICROSPHERES AND PERFLUTREN 3 ML: 10; .22 INJECTION, SOLUTION INTRAVENOUS at 08:48

## 2017-04-21 RX ADMIN — LEVOTHYROXINE SODIUM 137 MCG: 137 TABLET ORAL at 21:08

## 2017-04-21 RX ADMIN — SODIUM CHLORIDE: 9 INJECTION, SOLUTION INTRAVENOUS at 04:48

## 2017-04-21 RX ADMIN — PANTOPRAZOLE SODIUM 40 MG: 40 TABLET, DELAYED RELEASE ORAL at 11:58

## 2017-04-21 RX ADMIN — INSULIN GLARGINE 40 UNITS: 100 INJECTION, SOLUTION SUBCUTANEOUS at 12:47

## 2017-04-21 RX ADMIN — HYDROMORPHONE HYDROCHLORIDE 0.5 MG: 1 INJECTION, SOLUTION INTRAMUSCULAR; INTRAVENOUS; SUBCUTANEOUS at 03:27

## 2017-04-21 RX ADMIN — SODIUM CHLORIDE 2.5 UNITS/HR: 9 INJECTION, SOLUTION INTRAVENOUS at 05:13

## 2017-04-21 RX ADMIN — METOPROLOL SUCCINATE 100 MG: 100 TABLET, EXTENDED RELEASE ORAL at 21:07

## 2017-04-21 RX ADMIN — LISINOPRIL 20 MG: 20 TABLET ORAL at 21:07

## 2017-04-21 RX ADMIN — ACETAMINOPHEN 650 MG: 325 TABLET, FILM COATED ORAL at 21:08

## 2017-04-21 RX ADMIN — POTASSIUM CHLORIDE 20 MEQ: 1500 TABLET, EXTENDED RELEASE ORAL at 21:08

## 2017-04-21 RX ADMIN — HYDROMORPHONE HYDROCHLORIDE 0.2 MG: 1 INJECTION, SOLUTION INTRAMUSCULAR; INTRAVENOUS; SUBCUTANEOUS at 06:13

## 2017-04-21 RX ADMIN — ONDANSETRON 4 MG: 2 SOLUTION INTRAMUSCULAR; INTRAVENOUS at 04:03

## 2017-04-21 RX ADMIN — AMLODIPINE BESYLATE 5 MG: 5 TABLET ORAL at 11:47

## 2017-04-21 RX ADMIN — SIMVASTATIN 40 MG: 40 TABLET, FILM COATED ORAL at 21:08

## 2017-04-21 RX ADMIN — SODIUM CHLORIDE: 9 INJECTION, SOLUTION INTRAVENOUS at 15:01

## 2017-04-21 ASSESSMENT — PAIN DESCRIPTION - DESCRIPTORS
DESCRIPTORS: HEADACHE

## 2017-04-21 ASSESSMENT — VISUAL ACUITY
OU: BLURRED VISION;OTHER (SEE COMMENT)
OU: BLURRED VISION;OTHER (SEE COMMENT)
OU: BLURRED VISION
OU: BLURRED VISION;OTHER (SEE COMMENT)
OU: BLURRED VISION;OTHER (SEE COMMENT)
OU: BLURRED VISION

## 2017-04-21 ASSESSMENT — ACTIVITIES OF DAILY LIVING (ADL)
PREVIOUS_RESPONSIBILITIES: MEAL PREP;LAUNDRY;MEDICATION MANAGEMENT;FINANCES;DRIVING
IADL_COMMENTS: PT USES PILLBOX FOR MED MGMT

## 2017-04-21 NOTE — PROGRESS NOTES
Spoke to Dr. Blanco in ED-pt presented with visual changes to right eye and headache with symptoms since Tues.  He has cardiac history an anticoagulated.  Brain MR shows 5.5cmx2.5cm area of patchy restricted diffusion left posterior medial temporal occipital region with associated hemorrhage without evidence for associated enhancement; acute/early subacute infarction.  Also acute/subacute left parietal lobe. Per Dr. Blanco patient is poor historian re: cardiac hx, he is awake and alert following commands; Hospitalist consulted and Dr. Mendoza from neurology consulted.    Agree with admission  Keep BP <150 (parameters/management per neurology and intensivist)  Elevate HOB 30-60 degrees  Recommend f/u CT in am or sooner if mental status changes or neuro changes  Blood thinners held  Full consult to follow in am

## 2017-04-21 NOTE — CONSULTS
"[facility]    Neurosurgery Consultation     Date of Admission:  4/20/2017  Date of Consult (When I saw the patient): 04/21/17    Assessment & Plan   Jayro Elder is a 66 year old male who was admitted on 4/20/2017. I was asked to see the patient for hemorrhagic stroke.    Active Problems:    CVA (cerebral vascular accident) (H)    Assessment: Presently stable, nonoperative    Plan: Management per neuro critical care and cardiology. We will continue to follow as needed.      I have discussed the following assessment and plan with the Dr. Clifford who is in agreement with initial plan and will follow up with further consultation recommendations.    Pan Montes PA-C  [facility]  [address]  [address] [place] [address]    Tel [phone]  Pager [phone]        Code Status    Full Code    Reason for Consult   Reason for consult: Left occipital infarct    Primary Care Physician   Physician No Ref-Primary    Chief Complaint   Stroke symptoms    History is obtained from the patient and electronic health record    History of Present Illness   Jayro Elder is a 66 year old male who presents with visual loss in the right eye as well as a headache. His symptoms started on Tuesday. He was feeling weak and dizzy, but is now stating that he feels \"fairly normal now.\" He did present to the ER last night, and a brain MRI showed a left occipital infarct. He is also being seen by cardiology now, and he evidently has a new onset of atrial fibrillation, and the stroke is felt to be cardioembolic.    Past Medical History   I have reviewed this patient's medical history and updated it with pertinent information if needed.   Past Medical History:   Diagnosis Date     CAD (coronary artery disease) 6/29/05    anterior MI,  PTCA and stent placed in mid LAD     Cardiomyopathy (H)      Essential hypertension, benign 11/13/2002     Generalized osteoarthrosis, " unspecified site 11/13/2002     Myocardial infarction (H)      Neuropathy (H)      Tobacco use disorder 11/13/2002     Type II or unspecified type diabetes mellitus without mention of complication, not stated as uncontrolled 7/14/2005       Past Surgical History   I have reviewed this patient's surgical history and updated it with pertinent information if needed.  Past Surgical History:   Procedure Laterality Date     ANGIOGRAM  6/29/05    subtotal occ.mid LAD,SUSAN mid LAD     ANGIOGRAM  7/05    mild CAD,patent stent,no flow-limiting lesions,sev.LV dysf.LAD enlarged     ANGIOGRAM  2/09    Sev.single vessel disease,Mod LV dysf.distal LAD 90%,70-75% mid lad just before prev stent,SUSAN to prox.mid LAD, endeavor to distal LAD     ANGIOGRAM  11/13/13    restenosis, stent LAD     C NONSPECIFIC PROCEDURE      Laminectomy x 3 - (1983 x 2 & 1990)     C NONSPECIFIC PROCEDURE Bilateral 1998    Bunionectomy     C NONSPECIFIC PROCEDURE  1959    Gingival surgery at age 9       Prior to Admission Medications   Prior to Admission Medications   Prescriptions Last Dose Informant Patient Reported? Taking?   IBUPROFEN PO 4/20/2017 at Unknown time Self Yes Yes   Sig: Take 400 mg by mouth every 8 hours as needed for moderate pain   ONE TOUCH LANCETS MISC 4/20/2017 at Unknown time Self No Yes   Sig: use qid   ONE TOUCH ULTRA TEST VI STRP 4/20/2017 at Unknown time Self No Yes   Sig: use qid   amLODIPine (NORVASC) 5 MG tablet 4/20/2017 at Unknown time Pharmacy No Yes   Sig: Take 1 tablet (5 mg) by mouth daily   aspirin 81 MG EC tablet 4/20/2017 at Unknown time Self No Yes   Sig: Take 1 tablet (81 mg) by mouth daily   clopidogrel (PLAVIX) 75 MG tablet 4/19/2017 at Unknown time Self No Yes   Sig: Take 1 tablet (75 mg) by mouth daily   insulin glargine (BASAGLAR KWIKPEN) 100 UNIT/ML injection 4/20/2017 at 1600 Self Yes Yes   Sig: Inject 40 Units Subcutaneous every 24 hours   levothyroxine (SYNTHROID, LEVOTHROID) 137 MCG tablet 4/20/2017 at  Unknown time Pharmacy Yes Yes   Sig: Take 137 mcg by mouth At Bedtime    lisinopril (PRINIVIL,ZESTRIL) 20 MG tablet 4/19/2017 at Unknown time Pharmacy No Yes   Sig: Take 1 tablet (20 mg) by mouth daily   Patient taking differently: Take 20 mg by mouth At Bedtime    metFORMIN (GLUCOPHAGE) 1000 MG tablet 4/20/2017 at Unknown time Self No Yes   Sig: Take 1 tablet (1,000 mg) by mouth 2 times daily (with meals)   metoprolol (TOPROL-XL) 100 MG 24 hr tablet 4/19/2017 at Unknown time Pharmacy No Yes   Sig: Take 1 tablet (100 mg) by mouth At Bedtime   nitroglycerin (NITROSTAT) 0.4 MG SL tablet Unknown at Unknown time  No No   Sig: Place 1 tablet (0.4 mg) under the tongue every 5 minutes as needed for chest pain   pantoprazole (PROTONIX) 40 MG enteric coated tablet 4/20/2017 at Unknown time Self No Yes   Sig: Take 1 tablet (40 mg) by mouth every morning (before breakfast)   simvastatin (ZOCOR) 40 MG tablet 4/19/2017 at Unknown time Pharmacy No Yes   Sig: Take 1 tablet (40 mg) by mouth At Bedtime   sitagliptan (JANUVIA) 50 MG tablet 4/20/2017 at Unknown time Self Yes Yes   Sig: Take 50 mg by mouth daily      Facility-Administered Medications: None     Allergies   Allergies   Allergen Reactions     No Known Allergies        Social History   I have reviewed this patient's social history and updated it with pertinent information if needed. Jayro Elder  reports that he quit smoking about 11 years ago. His smoking use included Cigarettes. He has a 25.00 pack-year smoking history. He has never used smokeless tobacco. He reports that he drinks alcohol. He reports that he does not use illicit drugs.    Family History   I have reviewed this patient's family history and updated it with pertinent information if needed.   Family History   Problem Relation Age of Onset     Cancer - colorectal Sister      Thyroid Disease Brother      Cardiovascular Brother      DIABETES Brother      CANCER Father      tumor in chest and throat      "Arthritis Mother      Thyroid Disease Mother      DIABETES Mother        Review of Systems   Per history of present illness and PMH.    Physical Exam   Temp: 97.8  F (36.6  C) Temp src: Oral BP: (!) 147/93 Pulse: 121 Heart Rate: 76 Resp: 11 SpO2: 97 % O2 Device: None (Room air) Oxygen Delivery: 2 LPM  Vital Signs with Ranges  Temp:  [97.8  F (36.6  C)-98.1  F (36.7  C)] 97.8  F (36.6  C)  Pulse:  [] 121  Heart Rate:  [] 76  Resp:  [7-23] 11  BP: (102-160)/() 147/93  SpO2:  [89 %-98 %] 97 %  219 lbs 9.25 oz    Heart Rate: 76, Blood pressure (!) 147/93, pulse 121, temperature 97.8  F (36.6  C), temperature source Oral, resp. rate 11, height 6' 3\" (1.905 m), weight 219 lb 9.3 oz (99.6 kg), SpO2 97 %.  219 lbs 9.25 oz  HEENT:  Normocephalic, atraumatic.  PERRLA.  EOM s intact.   Neck:  Supple, non-tender, without lymphadenopathy.  Heart:  No peripheral edema  Lungs:  No SOB  Abdomen:  Soft, non-tender, non-distended.  Normal bowel sounds.  Skin:  Warm and dry, good capillary refill.  Extremities:  Good radial and dorsalis pedis pulses bilaterally, no edema, cyanosis or clubbing.    NEUROLOGICAL EXAMINATION:     Mental status:  Alert and Oriented x 3, speech is fluent.  Cranial nerves:  II-XII intact, with exception of right hemianopsia.  Motor:  Strength is 5/5 throughout the upper and lower extremities    Negative pronator. Normal finger to nose.    Data   All new lab and imaging data was personally reviewed by me.    MRI:  IMPRESSION:   1. Left occipital infarct with hemorrhagic transformation. No mass  effect.  2. Small White matter infarct in the white matter of the left parietal  lobe.  3. Atrophy. White matter hyperintensities compatible small vessel  ischemic change.    CBC RESULTS:   Recent Labs   Lab Test  04/21/17   0440   WBC  6.0   RBC  4.62   HGB  13.3   HCT  39.4*   MCV  85   MCH  28.8   MCHC  33.8   RDW  13.1   PLT  194     Basic Metabolic Panel:  Lab Results   Component Value Date    NA " 140 04/21/2017      Lab Results   Component Value Date    POTASSIUM 3.3 04/21/2017     Lab Results   Component Value Date    CHLORIDE 105 04/21/2017     Lab Results   Component Value Date    BETTIE 8.4 04/21/2017     Lab Results   Component Value Date    CO2 26 04/21/2017     Lab Results   Component Value Date    BUN 13 04/21/2017     Lab Results   Component Value Date    CR 0.72 04/21/2017     Lab Results   Component Value Date     04/21/2017     INR:  Lab Results   Component Value Date    INR 1.03 11/03/2010    INR 0.91 02/11/2009    INR 0.96 02/04/2009    INR 1.06 07/25/2005    INR 1.02 06/29/2005

## 2017-04-21 NOTE — CONSULTS
NCC called by Dr. Blanco about a large left PCA hemorrhagic stroke  Patient is three days out from the onset. Not a intervention candidate  Hemorrhagic stroke appearance is typical for cardioembolic stroke  Recs:  --Keep BP <140 mm Hg with Nicardipine  --No need for morning CT   --Telemetry for atrial fibrillation detection/monitoring  --Formal consult to follow

## 2017-04-21 NOTE — PLAN OF CARE
Problem: Goal Outcome Summary  Goal: Goal Outcome Summary  PT: PT order received and appreciated. Per discussion with interdisciplinary team and chart review, pt would benefit from PT eval however will await transfer out of ICU to 73 prior to initiating evaluation, per OT recommendations.

## 2017-04-21 NOTE — ED NOTES
[facility]  ED Nurse Handoff Report    ED Chief complaint: No chief complaint on file.      ED Diagnosis:   Final diagnoses:   None       Code Status: Full Code    Allergies:   Allergies   Allergen Reactions     No Known Allergies        Activity level - Baseline/Home:  Independent    Activity Level - Current:   Stand with Assist-d/t dizziness     Needed?: No    Isolation: No  Infection: Not Applicable    Bariatric?: No    Vital Signs:   Vitals:    04/20/17 2300 04/21/17 0056 04/21/17 0103 04/21/17 0200   BP: (!) 156/105  (!) 151/99 (!) 157/95   Pulse:   105 84   Resp:   20 18   Temp:       TempSrc:       SpO2:  96% 96% 96%   Weight:       Height:           Cardiac Rhythm: ,        Pain level: 0-10 Pain Scale: 5    Is this patient confused?: No    Patient Report: Initial Complaint: Pt had onset of vision loss in right eye, headache, and dizziness on Tuesday.  Focused Assessment: Pt has right eye right visual field cut. Frontal headache and mild dizziness.  Tests Performed: MRI and labs; EKG afib  Abnormal Results: MRI shows moderately sized acute/subacute infarction left temporal occipital region with hemorrhagic transformation. No significant global mass effect. 4mm acute/early subacute infarction left parietal lobe.   Treatments provided: 1L NS    Family Comments: Wife present and supportive.    OBS brochure/video discussed/provided to patient: N/A    ED Medications:   Medications   0.9% sodium chloride BOLUS (0 mLs Intravenous Stopped 4/20/17 3909)   gadobutrol (GADAVIST) injection 10 mL (10 mLs Intravenous Given 4/21/17 0033)       Drips infusing?:  No      ED NURSE PHONE NUMBER: [phone]

## 2017-04-21 NOTE — PROGRESS NOTES
[facility]    Hospitalist Progress Note      Assessment & Plan   Jayro Elder is a 66 year old male who was admitted on 4/20/2017.  Past history of CAD, HTN, DM II who presents with visual aura, found to have occipita CVA with hemorrhagic transformation in addition to new diagnosis A-fib.    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  Primary suspicion is for embolic event in the setting of new diagnosis atrial fibrillation. Serial trop negative, CRP, TSH wnl, LDL at goal.  - NPO pending SLP eval, PT/OT evals  - TTE today  - goal SBP <140, hydralazine and labetalol prn  - Q.2 hours neuro checks     New diagnosis atrial fibrillation with controlled ventricular rate: Likely source of patient's ischemic CVA as he was compliant with Plavix and aspirin.   - aspirin and Plavix on hold, no anticoagulants due to hemorrhage  - likely start apixaban in 4-6 weeks pending follow up imaging with Neuro     Type 2 diabetes with peripheral neuropathy, uncontrolled: Most recent hemoglobin A1c of 13.1 3/31/17. Follows with endocrinology. Initiated on insulin approximately 2 weeks ago.  - continue insulin gtt, transition to subq once cleared for PO  - Holding prior to admission insulin, Januvia, metformin     Coronary artery disease: Patient with multiple interventions including mid LAD SUSAN in 2005, SUSAN to proximal, mid LAD and [place] to distal LAD in 2009, restenosis with stent to LAD in 2013. Primary cardiologist is Dr. Mckeon.  - anticoagulation on hold, Plavix will be discontinued     Bilateral lower extremity foot pressure ulcerations: Patient with bilateral hallux bunions, distal toe necrosis from peripheral neuropathy and likely ill fitting footwear. Patient had planned for outpatient operative intervention through podiatry in the recent weeks, though this was on hold given uncontrolled  diabetes.  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 States that his father  of a similar tumor. Unable to describe further.    DVT Prophylaxis: Pneumatic Compression Devices  Code Status: Full Code    Disposition: Expected discharge in 2 days once work-up complete, neurologically stable.  Remain in ICU through tomorrow per Neuro.    Orlando Tate    Interval History   Reports ongoing visual field loss, blurry vision and headache.  No focal paresthesias or weakness.  No chest pain/pressure, reports occasional palpitations.  Endorses some mild orthopnea.    -Data reviewed today: I reviewed all new labs and imaging results over the last 24 hours. I personally reviewed no images or EKG's today.    Physical Exam   Temp: 98  F (36.7  C) Temp src: Oral BP: 129/89 Pulse: 121 Heart Rate: 89 Resp: 19 SpO2: 97 % O2 Device: Nasal cannula Oxygen Delivery: 2 LPM  Vitals:    17 2141 17 0400   Weight: 99.8 kg (220 lb) 99.6 kg (219 lb 9.3 oz)     Vital Signs with Ranges  Temp:  [97.8  F (36.6  C)-98.1  F (36.7  C)] 98  F (36.7  C)  Pulse:  [] 121  Heart Rate:  [] 89  Resp:  [7-23] 19  BP: (102-160)/() 129/89  SpO2:  [89 %-98 %] 97 %  I/O last 3 completed shifts:  In: 1183.42 [I.V.:183.42; IV Piggyback:1000]  Out: 420 [Urine:420]    Constitutional: Well developed, well nourished male in no acute distress  Respiratory: Lungs clear to ausculation bilaterally without crackles or wheezes, no tachypnea  Cardiovascular: irregularly irregular rhythm,  normal rate, normal S1/S2 without murmur, rubs or gallops, 1+ lower extremity edema  GI: abdomen soft, non-tender, non-distended, normal bowel sounds  Skin/Integumen:  No rash or bruising  Other:  alert and appropriate, right hemianopsia, cranial nerves otherwise intact    Medications     niCARdipine 40 mg in 200 mL 0.9% NaCl 5 mg/hr (04/21/17 0734)     NaCl 100 mL/hr at 04/21/17 0731     - MEDICATION INSTRUCTIONS -       insulin (regular) 4 Units/hr (04/21/17 0732)       ondansetron           Data     Recent Labs  Lab 04/21/17  0440 04/20/17  2200   WBC 6.0 4.9   HGB 13.3 14.6   MCV 85 86    213    140   POTASSIUM 3.3* 3.6   CHLORIDE 105 104   CO2 26 26   BUN 13 16   CR 0.72 0.90   ANIONGAP 9 10   BETTIE 8.4* 8.7   * 345*   TROPI <0.015The 99th percentile for upper reference range is 0.045 ug/L.  Troponin values in the range of 0.045 - 0.120 ug/L may be associated with risks of adverse clinical events. <0.015The 99th percentile for upper reference range is 0.045 ug/L.  Troponin values in the range of 0.045 - 0.120 ug/L may be associated with risks of adverse clinical events.       Recent Results (from the past 24 hour(s))   MR Neck w/o & w Contrast Angiogram    Narrative    MRA ANGIOGRAM NECK W/O & W CONTRAST 4/21/2017 12:32 AM     HISTORY:  Evaluate for dissection, vertebrobasilar flow, carotid  stenosis    TECHNIQUE:  Sequential axial images of the neck were obtained using  2-dimensional time-of-flight before contrast and 3-dimensional  time-of-flight after the uneventful administration of 10 mL Gadavist  iv contrast.    COMPARISON:  None.    FINDINGS: Stenosis is relative to the distal internal carotid  diameter.    Right Carotid:  No significant stenosis is seen at the bifurcation  relative to the distal internal carotid diameter.    Left Carotid:  No significant stenosis is seen at the bifurcation  relative to the distal internal carotid diameter.    Vertebrals: Antegrade flow is seen in both  vertebral arteries.    No arterial dissection is identified.    There appear to be multiple thyroid nodules. A nodule in the left lobe  measures up to 3 cm in size.      Impression    IMPRESSION:   1. No stenosis is seen at either carotid bifurcation. No arterial  dissection.  2. Multiple thyroid nodules. Ultrasound would be helpful to further  characterize these nodules.    I agree with the preliminary report that was communicated to the  referring physician.    REINA PAEZ MD   MR Brain w/o & w Contrast    Narrative    MR BRAIN W/O & W CONTRAST  4/21/2017 12:33 AM     HISTORY:  check for stroke or bleed    TECHNIQUE: Multiplanar, multisequence MRI of the brain without and  with 10 mL Gadavist IV contrast material.    COMPARISON: None.    FINDINGS:  Diffusion-weighted images reveal an area of restricted  diffusion in the left occipital region. This measures about 5.5 cm in  AP dimension by 3 cm in transverse dimension. On gradient-echo images,  there is susceptibility artifact throughout this infarct consistent  with hemorrhagic transformation. No mass effect. There is also a  small, 4 mm area of restricted diffusion in the white matter of the  left parietal lobe consistent with a small white matter infarct. There  is enlargement of the lateral ventricles and cortical sulci consistent  with atrophy. T2 hyperintensities are seen in the white matter  compatible small vessel ischemic change in this age patient.. There  are no gadolinium enhancing lesions.  The facial structures appear  normal.  The arteries at the base of the brain and the dural venous  sinuses appear patent.      Impression    IMPRESSION:   1. Left occipital infarct with hemorrhagic transformation. No mass  effect.  2. Small White matter infarct in the white matter of the left parietal  lobe.  3. Atrophy. White matter hyperintensities compatible small vessel  ischemic change.    I agree with the preliminary report that was communicated to  the  referring physician.     REINA PAEZ MD   MR Head w/o Contrast Angiogram    Narrative    MRA ANGIOGRAM HEAD W/O CONTRAST   4/21/2017 12:34 AM     HISTORY:  check for occlusion    TECHNIQUE:  3D time-of-flight MR angiogram of the head without  contrast.    COMPARISON: None.    FINDINGS: The visualized portions of the distal internal carotid and  vertebral arteries, the basilar artery, [place], and the  proximal anterior, middle and posterior cerebral arteries all appear  normal. There is no evidence of aneurysm or vascular stenosis or  occlusion.      Impression    IMPRESSION:  Negative, no occluded intracranial vessels. The left  posterior cerebral artery appears patent.    I agree with the preliminary report that was communicated to the  referring physician.    REINA PAEZ MD

## 2017-04-21 NOTE — CONSULTS
[facility]    Cardiology Consultation     Date of Admission:  4/20/2017  Date of Consult (When I saw the patient): 04/21/17    Assessment & Plan   Jayro Elder is a 66 year old male who was admitted on 4/20/2017.    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  Currently stable.  He has new diagnosis of atrial fibrillation, review of EKGs indicates this was present at the end of March.  Agree this should be treated as an embolic event due to his atrial fibrillation.  Currently stable but not anticoagulation candidate because of the bleed.  Depending on his course and whether he eventually becomes a good anticoagulation candidate for long-term anticoagulation, would reevaluate him for possible left atrial appendage closure also.    2-atrial fibrillation, new diagnosis.  Asymptomatic and he was unaware of it.  Rate was controlled on his beta blocker.  Since he is asymptomatic , normally a rate control strategy with chronic anticoagulation would be considered first line therapy.  Acutely he cannot have anticoagulation because of his bleed.  Therefore not a candidate for cardioversion unless it somehow becomes emergent.  [facility] by neurology that he could be an anticoagulation candidate in a monthreassess him for how well he will tolerate chronic anticoagulation, and the role of left atrial appendage closure  When he passes a swallowing test I would restart his metoprolol as it is likely responsible for his control ventricular response to the atrial fibrillation    3-coronary artery disease.  History of previous LAD stenting multiple times.  Most recently in 2013.  Had been maintained on dual antiplatelet therapy after that, although this is interrupted now because of the hemorrhagic stroke.  Should be very low risk for temporary discontinuation of dual antiplatelet therapy or anticoagulation from a coronary disease standpoint.  When on oral anticoagulants, would not  need antiplatelet therapy     4-mild ischemic cardiomyopathy, ejection fraction 45-50% since anterior MI in 2005.  No heart failure symptoms or findings currently.    5-recent out-of-control diabetes mellitus with hemoglobin A1c greater than 13%.  He subsequently restarted insulin and states most of his glucose checks he reports areimproved    Justin Tomlin M.D.    Primary Care Physician   Physician No Ref-Primary    Reason for Consult   Reason for consult: I was asked by Dr. Berry to evaluate this patient for atrial fibrillation with likely cardioembolic stroke and history of coronary disease and cardiomyopathy.    History of Present Illness   Jayro Elder is a 66 year old male who presents with headache and neurologic symptoms and findings.  He notes a couple days prior to admission he started having flashing lights in his right lateral visual field.  He also developed a low-grade headache.  This persisted although the flashing lights indicative better but he continued to have a persistent field cut.  Therefore after he returned to the [place] here after a little more than 2 days with the symptoms he presented to the emergency room where a CT scan showed a hemorrhagic cerebellar stroke in the distribution of the left occipital infarct in the area of the posterior cerebral artery.  MRI also noted a separate small left parietal lobe bland infarct.  He was noted to be in atrial fibrillation at presentation with controlled rate.  He was unaware of this and states he had no palpitations or dizziness or syncope other than the symptoms associated with his stroke.  He has not had any new dyspnea on exertion or other ischemic symptoms.  Previous symptoms were pain from his chest through back to between his shoulder blades.  I reviewed his chart and he did have an EKG on 3/31/17 showing atrial fibrillation with controlled ventricular response.  Duration of atrial fibrillation is unknown however.    He has history  of coronary disease including an anterior STEMI in 2005 treated with LAD stenting.  In 2009 he had new lesions in the LAD and underwent several more stents.  2013 he had restenosis and some of his distal LAD stents and had repeat stenting.  He's been maintained on dual antiplatelet therapy since that time.  He has been without recurrent ischemic symptoms.  After his MI he had an ejection fraction around 45-50% which has been stable since then.  He currently is without orthopnea, new edema, or other heart failure symptoms.  Last left ventricular function evaluation in 2013 showed ejection fraction of 45% by angiography with distal anterior and apical severe hypokinesis.    Patient Active Problem List   Diagnosis     Generalized osteoarthrosis, unspecified site     Tobacco use disorder     Hypertension goal BP (blood pressure) < 140/90     Benign neoplasm of lower jaw bone     Abdominal pain, right upper quadrant     Diabetes mellitus, type 2 (H)     Cardiovascular disease     Type 2 diabetes, HbA1C goal < 8% (H)     CARDIOVASCULAR SCREENING; LDL GOAL LESS THAN 100     [facility] [place]     Cardiomyopathy (H)     CAD (coronary artery disease)     CVA (cerebral vascular accident) (H)       Past Medical History   I have reviewed this patient's medical history and updated it with pertinent information if needed.   Past Medical History:   Diagnosis Date     CAD (coronary artery disease) 6/29/05    anterior MI,  PTCA and stent placed in mid LAD     Cardiomyopathy (H)      Essential hypertension, benign 11/13/2002     Generalized osteoarthrosis, unspecified site 11/13/2002     Myocardial infarction (H)      Neuropathy (H)      Tobacco use disorder 11/13/2002     Type II or unspecified type diabetes mellitus without mention of complication, not stated as uncontrolled 7/14/2005       Past Surgical History   I have reviewed this patient's surgical history and updated it with pertinent information if needed.  Past Surgical  History:   Procedure Laterality Date     ANGIOGRAM  6/29/05    subtotal occ.mid LAD,SUSAN mid LAD     ANGIOGRAM  7/05    mild CAD,patent stent,no flow-limiting lesions,sev.LV dysf.LAD enlarged     ANGIOGRAM  2/09    Sev.single vessel disease,Mod LV dysf.distal LAD 90%,70-75% mid lad just before prev stent,SUSAN to prox.mid LAD, endeavor to distal LAD     ANGIOGRAM  11/13/13    restenosis, stent LAD     C NONSPECIFIC PROCEDURE      Laminectomy x 3 - (1983 x 2 & 1990)     C NONSPECIFIC PROCEDURE Bilateral 1998    Bunionectomy     C NONSPECIFIC PROCEDURE  1959    Gingival surgery at age 9       Prior to Admission Medications   Prior to Admission Medications   Prescriptions Last Dose Informant Patient Reported? Taking?   IBUPROFEN PO 4/20/2017 at Unknown time Self Yes Yes   Sig: Take 400 mg by mouth every 8 hours as needed for moderate pain   ONE TOUCH LANCETS MISC 4/20/2017 at Unknown time Self No Yes   Sig: use qid   ONE TOUCH ULTRA TEST VI STRP 4/20/2017 at Unknown time Self No Yes   Sig: use qid   amLODIPine (NORVASC) 5 MG tablet 4/20/2017 at Unknown time Pharmacy No Yes   Sig: Take 1 tablet (5 mg) by mouth daily   aspirin 81 MG EC tablet 4/20/2017 at Unknown time Self No Yes   Sig: Take 1 tablet (81 mg) by mouth daily   clopidogrel (PLAVIX) 75 MG tablet 4/19/2017 at Unknown time Self No Yes   Sig: Take 1 tablet (75 mg) by mouth daily   insulin glargine (BASAGLAR KWIKPEN) 100 UNIT/ML injection 4/20/2017 at 1600 Self Yes Yes   Sig: Inject 40 Units Subcutaneous every 24 hours   levothyroxine (SYNTHROID, LEVOTHROID) 137 MCG tablet 4/20/2017 at Unknown time Pharmacy Yes Yes   Sig: Take 137 mcg by mouth At Bedtime    lisinopril (PRINIVIL,ZESTRIL) 20 MG tablet 4/19/2017 at Unknown time Pharmacy No Yes   Sig: Take 1 tablet (20 mg) by mouth daily   Patient taking differently: Take 20 mg by mouth At Bedtime    metFORMIN (GLUCOPHAGE) 1000 MG tablet 4/20/2017 at Unknown time Self No Yes   Sig: Take 1 tablet (1,000 mg) by mouth 2  times daily (with meals)   metoprolol (TOPROL-XL) 100 MG 24 hr tablet 4/19/2017 at Unknown time Pharmacy No Yes   Sig: Take 1 tablet (100 mg) by mouth At Bedtime   nitroglycerin (NITROSTAT) 0.4 MG SL tablet Unknown at Unknown time  No No   Sig: Place 1 tablet (0.4 mg) under the tongue every 5 minutes as needed for chest pain   pantoprazole (PROTONIX) 40 MG enteric coated tablet 4/20/2017 at Unknown time Self No Yes   Sig: Take 1 tablet (40 mg) by mouth every morning (before breakfast)   simvastatin (ZOCOR) 40 MG tablet 4/19/2017 at Unknown time Pharmacy No Yes   Sig: Take 1 tablet (40 mg) by mouth At Bedtime   sitagliptan (JANUVIA) 50 MG tablet 4/20/2017 at Unknown time Self Yes Yes   Sig: Take 50 mg by mouth daily      Facility-Administered Medications: None     Current Facility-Administered Medications   Medication Dose Route Frequency     amLODIPine  5 mg Oral Daily     levothyroxine  137 mcg Oral At Bedtime     lisinopril  20 mg Oral At Bedtime     metoprolol  100 mg Oral At Bedtime     pantoprazole  40 mg Oral QAM AC     simvastatin  40 mg Oral At Bedtime     insulin glargine  40 Units Subcutaneous Q24H     insulin aspart  1-7 Units Subcutaneous TID AC     insulin aspart  1-5 Units Subcutaneous At Bedtime     Current Facility-Administered Medications   Medication Last Rate     niCARdipine 40 mg in 200 mL 0.9% NaCl 5 mg/hr (04/21/17 0734)     NaCl 100 mL/hr at 04/21/17 0731     - MEDICATION INSTRUCTIONS -       insulin (regular) 1 Units/hr (04/21/17 1010)     Allergies   Allergies   Allergen Reactions     No Known Allergies        Social History    reports that he quit smoking about 11 years ago. His smoking use included Cigarettes. He has a 25.00 pack-year smoking history. He has never used smokeless tobacco. He reports that he drinks alcohol. He reports that he does not use illicit drugs.    Family History   Family History   Problem Relation Age of Onset     Cancer - colorectal Sister      Thyroid Disease  "Brother      Cardiovascular Brother      DIABETES Brother      CANCER Father      tumor in chest and throat     Arthritis Mother      Thyroid Disease Mother      DIABETES Mother        Review of Systems   The 10 point Review of Systems is negative other than noted in the HPI or here.     Physical Exam   Vital Signs with Ranges  Temp:  [97.8  F (36.6  C)-98.1  F (36.7  C)] 98  F (36.7  C)  Pulse:  [] 121  Heart Rate:  [] 94  Resp:  [7-23] 15  BP: (102-160)/() 127/77  SpO2:  [89 %-98 %] 94 %  Vitals:    04/20/17 2141 04/21/17 0400   Weight: 99.8 kg (220 lb) 99.6 kg (219 lb 9.3 oz)     I/O last 3 completed shifts:  In: 1183.42 [I.V.:183.42; IV Piggyback:1000]  Out: 420 [Urine:420]    Vitals: /77  Pulse 121  Temp 98  F (36.7  C) (Oral)  Resp 15  Ht 1.905 m (6' 3\")  Wt 99.6 kg (219 lb 9.3 oz)  SpO2 94%  BMI 27.45 kg/m2    Constitutional: No acute distress    Skin: Clear    Head/Eyes/ENT:  No cyanosis, pallor, icterus, trauma    Neck:  Normal carotid upstrokes without bruits    Chest:  Clear    Cardiac: Irregularly irregular rhythm without murmur gallop heave or JVD    Abdomen:  Nondistended nontender, no hepatomegaly, no bruit    Vascular: Radial femoral and pedal pulses intact.    Extremities and Back:  There is 1-2+ pedal and lower pretibial edema on the left and 1+ on the right.  He states this is chronic.  No deformity cyanosis erythema     Neurological:  Alert and oriented ×3.  Pupils equal, extraocular motions intact.  There is a right lateral homonymous field cut.  Face symmetrical, speech fluent.  Upper and lower motor strength and tone intact and symmetrical.      Recent Labs  Lab 04/21/17  0440 04/20/17  2200   TROPI <0.015The 99th percentile for upper reference range is 0.045 ug/L.  Troponin values in the range of 0.045 - 0.120 ug/L may be associated with risks of adverse clinical events. <0.015The 99th percentile for upper reference range is 0.045 ug/L.  Troponin values in the " range of 0.045 - 0.120 ug/L may be associated with risks of adverse clinical events.         Recent Labs  Lab 04/21/17  0440 04/20/17  2200   WBC 6.0 4.9   HGB 13.3 14.6   MCV 85 86    213    140   POTASSIUM 3.3* 3.6   CHLORIDE 105 104   CO2 26 26   BUN 13 16   CR 0.72 0.90   GFRESTIMATED >90Non  GFR Calc 85   GFRESTBLACK >90African American GFR Calc >90African American GFR Calc   ANIONGAP 9 10   BETTIE 8.4* 8.7   * 345*   TROPI <0.015The 99th percentile for upper reference range is 0.045 ug/L.  Troponin values in the range of 0.045 - 0.120 ug/L may be associated with risks of adverse clinical events. <0.015The 99th percentile for upper reference range is 0.045 ug/L.  Troponin values in the range of 0.045 - 0.120 ug/L may be associated with risks of adverse clinical events.       Imaging:  Recent Results (from the past 48 hour(s))   MR Neck w/o & w Contrast Angiogram    Narrative    MRA ANGIOGRAM NECK W/O & W CONTRAST 4/21/2017 12:32 AM     HISTORY:  Evaluate for dissection, vertebrobasilar flow, carotid  stenosis    TECHNIQUE:  Sequential axial images of the neck were obtained using  2-dimensional time-of-flight before contrast and 3-dimensional  time-of-flight after the uneventful administration of 10 mL Gadavist  iv contrast.    COMPARISON:  None.    FINDINGS: Stenosis is relative to the distal internal carotid  diameter.    Right Carotid:  No significant stenosis is seen at the bifurcation  relative to the distal internal carotid diameter.    Left Carotid:  No significant stenosis is seen at the bifurcation  relative to the distal internal carotid diameter.    Vertebrals: Antegrade flow is seen in both vertebral arteries.    No arterial dissection is identified.    There appear to be multiple thyroid nodules. A nodule in the left lobe  measures up to 3 cm in size.      Impression    IMPRESSION:   1. No stenosis is seen at either carotid bifurcation. No arterial  dissection.  2.  Multiple thyroid nodules. Ultrasound would be helpful to further  characterize these nodules.    I agree with the preliminary report that was communicated to the  referring physician.    REINA PAEZ MD   MR Brain w/o & w Contrast    Narrative    MR BRAIN W/O & W CONTRAST  4/21/2017 12:33 AM     HISTORY:  check for stroke or bleed    TECHNIQUE: Multiplanar, multisequence MRI of the brain without and  with 10 mL Gadavist IV contrast material.    COMPARISON: None.    FINDINGS:  Diffusion-weighted images reveal an area of restricted  diffusion in the left occipital region. This measures about 5.5 cm in  AP dimension by 3 cm in transverse dimension. On gradient-echo images,  there is susceptibility artifact throughout this infarct consistent  with hemorrhagic transformation. No mass effect. There is also a  small, 4 mm area of restricted diffusion in the white matter of the  left parietal lobe consistent with a small white matter infarct. There  is enlargement of the lateral ventricles and cortical sulci consistent  with atrophy. T2 hyperintensities are seen in the white matter  compatible small vessel ischemic change in this age patient.. There  are no gadolinium enhancing lesions.  The facial structures appear  normal.  The arteries at the base of the brain and the dural venous  sinuses appear patent.      Impression    IMPRESSION:   1. Left occipital infarct with hemorrhagic transformation. No mass  effect.  2. Small White matter infarct in the white matter of the left parietal  lobe.  3. Atrophy. White matter hyperintensities compatible small vessel  ischemic change.    I agree with the preliminary report that was communicated to the  referring physician.     REINA PAEZ MD   MR Head w/o Contrast Angiogram    Narrative    MRA ANGIOGRAM HEAD W/O CONTRAST   4/21/2017 12:34 AM     HISTORY:  check for occlusion    TECHNIQUE:  3D time-of-flight MR angiogram of the head without  contrast.    COMPARISON: None.    FINDINGS:  The visualized portions of the distal internal carotid and  vertebral arteries, the basilar artery, [place], and the  proximal anterior, middle and posterior cerebral arteries all appear  normal. There is no evidence of aneurysm or vascular stenosis or  occlusion.      Impression    IMPRESSION:  Negative, no occluded intracranial vessels. The left  posterior cerebral artery appears patent.    I agree with the preliminary report that was communicated to the  referring physician.    REINA PAEZ MD       Echo:  No results found for this or any previous visit (from the past 4320 hour(s)).

## 2017-04-21 NOTE — PROGRESS NOTES
" 04/21/17 1040   Quick Adds   Type of Visit Initial Occupational Therapy Evaluation   Living Environment   Lives With spouse   Living Arrangements house   Home Accessibility stairs within home;bed not on first floor   Number of Stairs to Enter Home 13   Transportation Available car;family or friend will provide  (Pt still drives; however, family can provide for now.)   Self-Care   Dominant Hand right   Usual Activity Tolerance excellent   Current Activity Tolerance moderate   Equipment Currently Used at Home none   Functional Level Prior   Ambulation 0-->independent   Transferring 0-->independent   Toileting 0-->independent  (comfort height toilet )   Bathing 0-->independent   Dressing 0-->independent   Eating 0-->independent   Communication 0-->understands/communicates without difficulty   Swallowing 0-->swallows foods/liquids without difficulty   Cognition 0 - no cognition issues reported   Fall history within last six months no   General Information   Onset of Illness/Injury or Date of Surgery - Date 04/20/17   Referring Physician KARI Arriaga MD   Patient/Family Goals Statement Pt plans to return home at discharge   Additional Occupational Profile Info/Pertinent History of Current Problem Admitted for visual changes and dizziness. Imaging indicated L occipital lobe hemorrhagic infarct.    Precautions/Limitations fall precautions   Cognitive Status Examination   Orientation orientation to person, place and time   Level of Consciousness alert   Able to Follow Commands WNL/WFL   Personal Safety (Cognitive) at risk behaviors demonstrated   Memory (Will continue to monitor and assess as needed)   Cognitive Comment Per pt, \"I was a little disoriented yesterday.\"    Visual Perception   Visual Perception Wears glasses   Visual Perception Comments Per pt, \"R eye is blurry but its improving. It also cuts off on the R side.\" Difficulty locating items on R side. Tracking intact   Sensory Examination   Sensory Quick Adds No " deficits were identified   Sensory Comments Denies B UE numbness and tingling.    Pain Assessment   Patient Currently in Pain No   Range of Motion (ROM)   ROM Quick Adds No deficits were identified   ROM Comment B UE WNL   Strength   Manual Muscle Testing Quick Adds No deficits were identified   Strength Comments B UE 5/5 on MMT   Hand Strength   Hand Strength Comments B grasp WFL   Coordination   Upper Extremity Coordination Right UE impaired   Coordination Comments Mild incoordination of R UE which may be due to visual changes.    Mobility   Bed Mobility Comments I with bed mobility   Transfer Skill: Bed to Chair/Chair to Bed   Level of [place]: Bed to Chair stand-by assist   Transfer Skill: Sit to Stand   Level of [place]: Sit/Stand stand-by assist   Transfer Skill: Toilet Transfer   Level of [place]: Toilet stand-by assist   Assistive Device seat riser   Upper Body Dressing   Level of [place]: Dress Upper Body stand-by assist   Lower Body Dressing   Level of [place]: Dress Lower Body stand-by assist   Toileting   Level of [place]: Toilet stand-by assist   Grooming   Level of [place]: Grooming stand-by assist   Eating/Self Feeding   Level of [place]: Eating independent   Instrumental Activities of Daily Living (IADL)   Previous Responsibilities meal prep;laundry;medication management;finances;driving   IADL Comments Pt uses pillbox for med mgmt   Activities of Daily Living Analysis   Impairments Contributing to Impaired Activities of Daily Living balance impaired;cognition impaired;coordination impaired   General Therapy Interventions   Planned Therapy Interventions ADL retraining;cognition;neuromuscular re-education;transfer training;visual perception   Clinical Impression   Criteria for Skilled Therapeutic Interventions Met yes, treatment indicated   OT Diagnosis Decreased I and safety with ADLs   Influenced by the following impairments R visual changes with associated R  "UE incoordination; Impaired safety; Decreased balance   Assessment of Occupational Performance 1-3 Performance Deficits   Identified Performance Deficits Dressing; Toileting   Clinical Decision Making (Complexity) Low complexity   Therapy Frequency daily   Predicted Duration of Therapy Intervention (days/wks) 5 days   Anticipated Discharge Disposition Home with Assist;Home with Outpatient Therapy   Risks and Benefits of Treatment have been explained. Yes   Patient, Family & other staff in agreement with plan of care Yes   [facility] TM \"6 Clicks\"   2016, Trustees of [facility], under license to [employer].  All rights reserved.   6 Clicks Short Forms Daily Activity Inpatient Short Form   [facility]  \"6 Clicks\" Daily Activity Inpatient Short Form   1. Putting on and taking off regular lower body clothing? 3 - A Little   2. Bathing (including washing, rinsing, drying)? 3 - A Little   3. Toileting, which includes using toilet, bedpan or urinal? 3 - A Little   4. Putting on and taking off regular upper body clothing? 3 - A Little   5. Taking care of personal grooming such as brushing teeth? 3 - A Little   6. Eating meals? 4 - None   Daily Activity Raw Score (Score out of 24.Lower scores equate to lower levels of function) 19   Total Evaluation Time   Total Evaluation Time (Minutes) 15     "

## 2017-04-21 NOTE — PLAN OF CARE
Problem: Goal Outcome Summary  Goal: Goal Outcome Summary  Outcome: No Change  Pt admitted to ICU w/ L occipital CVA w/hemorrhagic transformation. A/O x4. Right eye complete hemianopia present. HA tx w/IV dilaudid. Pt denies dizziness; no additional neuro deficits noted. 2L NC. Tele shows Afib w/CVR. Nicardipine gtt to keep SBP < 140. Insulin gtt infusing. Vdg per urinal. NPO until evaluated by speech. Plan for TTE w/bubble study, PT/OT and WOC RN consult.

## 2017-04-21 NOTE — PROGRESS NOTES
[facility] Nurse Inpatient Wound Assessment     Initial Assessment of wound(s) on pt's:   Ranjeet foot callus and necrotic toe met head        Data:   Patient History:      per MD note(s): Jayro Elder is a 66 year old male who was admitted on 2017. Past history of CAD, HTN, DM II who presents with visual aura, found to have occipita CVA with hemorrhagic transformation in addition to new diagnosis A-fib.     Subacute left occipital CVA, likely cardioembolic, with hemorrhagic transformation: Confirmed on admission MRI, MRA negative for stenosis/obstruction. Primary suspicion is for embolic event in the setting of new diagnosis atrial fibrillation         Current Diet / Nutrition:        Active Diet Order      Moderate Consistent CHO Diet         Active Diet Order      Moderate Consistent CHO Diet            Other Diabetic    Wilmer Assessment and sub scores:   Wilmer Score  Av.5  Min: 18  Max: 19     Labs:         Recent Labs   Lab Test  17   0440   17   1042   11/03/10   0940   11/02/10   0900   ALBUMIN   --    --    --    --    --    --   4.4   HGB  13.3   < >  14.5   < >   --    < >  14.5   RBC  4.62   < >  5.06   < >   --    < >  4.91   WBC  6.0   < >  6.7   < >   --    < >  9.7       Wound Assessment (location):   Ranjeet foot wounds  Wound History:  States is from rubbing/friction from his shoes    Wound Base: Left foot:  Palpable pedal pulse, warm to touch, slight edema, color uniform.decrease sensation     Great toe met head base plantar aspect callus 2 x 2 cm. Thick non viable base, no drainage note. No periwound erythema.    eschar to 2nd toe met head nail ~1.5 x 1 cm.  Dry no drainage, periwound erytjema      Wound Base: Right foot:  Palpable pedal pulse, warm to touch, slight edema, color uniform.decrease sensation    Medial great toe base thickened callus, measures 2 x 2 cm.    dry and no drainage, no erythema            Intervention:     Patient's chart evaluated.       Wound(s) evaluated for current topical cares, assessed that pt. Will follow up with DPM when his glucose levels are under control.     Wound Care: Orders  Written    Supplies  N/A    Discussed plan of care with Patient          Assessment:      Wounds are stable.  Would not use moist wound healing to open up wounds with questionable vascular supply    Keep clean and dry, for now and protected.  Cover with thick cotton socks, apply clean socks daily        Plan:   Pt to follow up for possible debridement and amputation of Left 2nd toe met head     keep clean and dry, for now and protected.    Daily foot cares located on chart under Nursing     Nursing to notify the Provider(s) and re-consult the WOC Nurse if wound(s) deteriorate(s) or if the wound care plan needs reevaluation.      WOC Nurse will return: as needed     Face to face time: 31-45 Minutes

## 2017-04-21 NOTE — PLAN OF CARE
Problem: Goal Outcome Summary  Goal: Goal Outcome Summary  OT: Eval complete and Tx initiated. Pt admitted for visual changes with imaging indicating a  L occipital lobe infarct. Prior to admit, pt lives in house with wife and reports I with ADL/IADLs, including med mgmt and driving. Currently, pt requires SBA with functional transfer and ADLs due to R visual changes with associated incoordination, impaired safety, and decreased balance. Pt complaining of mild headache throughout session. Pt would benefit from continued OT intervention to ensure safety with ADL/IADLs. Anticipate discharge home with increased A from family for IADLs, thalia transportation, and OP OT for vision rehab.

## 2017-04-21 NOTE — CONSULTS
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 (H)  --Left PCA cardioembolic stroke secondary to new onset of atrial fibrillation and cardiomyopathy  --No anticoagulation or antiplatelet medications for 4-6 weeks  ----Will need neuroimaging in 4 weeks to determine timing  (I61.9) Hemorrhagic stroke (H)  --Significant hemorrhagic transformation of the stroke, which is typical for cardioembolism  --Will explain headache  (I42.9) Cardiomyopathy (H)   --Echo pending  (I48.91) New onset atrial fibrillation (H)  --Management per cardiology  --Eventually will need to be started on apixaban  (E11.65) Poorly controlled type 2 diabetes mellitus (H)  --A1C 13.1  --Management per hospitalists  (I10) Essential hypertension  --Keep BP <140 mm Hg  --Can be started on oral medication by hospitalists after swallowing clearance  --Currently on Nicardipine  (I25.10) Coronary artery disease involving native coronary artery of native heart without angina pectoris  --Multiple stents (at least 4), none in the past year  --Secondary to uncontrolled DM  --Can be off Plavix  #. DVT Prophylaxis  --Mechanical only due to hemorrhagic stroke  #. PT/OT/Speech  --Start evaluations  #. Nutrition / GI Prophylaxis  --Per recommendations of speech therapy      #. Code Status: Full Code     TIME:   Neurocritical care time:  80 minutes for evaluation and management of large hemorrhagic stroke in patient with cardiomyopathy. Patient is critically  "ill and has a very high risk of deterioration  ----------------------------------------------------------------------------------  ----------------------------------------------------------------------------------  Reason for consult: I was asked by Dr. Blanco to evaluate this patient for stroke.    Chief Complaint   ______________________________________________  Vision changes  History is obtained from the patient    History of Present Illness   ______________________________________________  66 year old gentleman treated with Plavix and Aspirin for a history of CAD, cardiomyopathy s/p stent placement, type 2 diabetes, and hypertension who presents with vision loss and headache. The patient reports onset of vision loss in his right eye and headache on Tuesday, 4/18/17. He drove back from [place] on evening of 4/20/17 and presented to the ED for evaluation.    On arrival to the ED, the patient reports that he has vision loss in his right eye in the right side of his vision. He states that he \"sees a red A like from a road sign\" in his vision. The patient also notes a headache on both sides of his head that he rates at 3/10. The patient reports that he feels a little bit dizzy and has been having troubles with his balance, noting that he has to grab onto walls to ambulate. He states his symptoms have not changed since onset on Tuesday, though he is no longer \"seeing stars.\" The patient denies any numbness, weakness, tingling, vomiting, or speech changes. No history of stroke. Atrial fibrillation was documented in ER  MRI brain demonstrated hemorrhagic L PCA stroke without midline shift. MRA neck and head are unremarkable. I was called at 0245 and recommended keeping BP<140 mm Hg (BP in ER as 150-160s). I recommended Nicardipine.   Overnight, stable clinically. BP is well controlled  Past Medical History    ______________________________________________  Past Medical History:   Diagnosis Date     CAD (coronary " artery disease) 6/29/05    anterior MI,  PTCA and stent placed in mid LAD     Cardiomyopathy (H)      Essential hypertension, benign 11/13/2002     Generalized osteoarthrosis, unspecified site 11/13/2002     Myocardial infarction (H)      Neuropathy (H)      Tobacco use disorder 11/13/2002     Type II or unspecified type diabetes mellitus without mention of complication, not stated as uncontrolled 7/14/2005     Past Surgical History   ______________________________________________  Past Surgical History:   Procedure Laterality Date     ANGIOGRAM  6/29/05    subtotal occ.mid LAD,SUSAN mid LAD     ANGIOGRAM  7/05    mild CAD,patent stent,no flow-limiting lesions,sev.LV dysf.LAD enlarged     ANGIOGRAM  2/09    Sev.single vessel disease,Mod LV dysf.distal LAD 90%,70-75% mid lad just before prev stent,SUSAN to prox.mid LAD, endeavor to distal LAD     ANGIOGRAM  11/13/13    restenosis, stent LAD     C NONSPECIFIC PROCEDURE      Laminectomy x 3 - (1983 x 2 & 1990)     C NONSPECIFIC PROCEDURE Bilateral 1998    Bunionectomy     C NONSPECIFIC PROCEDURE  1959    Gingival surgery at age 9     Prior to Admission Medications   ______________________________________________  Prior to Admission Medications   Prescriptions Last Dose Informant Patient Reported? Taking?   ONE TOUCH LANCETS MISC 4/20/2017 at Unknown time Self No Yes   Sig: use qid   ONE TOUCH ULTRA TEST VI STRP 4/20/2017 at Unknown time Self No Yes   Sig: use qid   amLODIPine (NORVASC) 5 MG tablet 4/20/2017 at Unknown time  No Yes   Sig: Take 1 tablet (5 mg) by mouth daily   aspirin 81 MG EC tablet 4/20/2017 at Unknown time  No Yes   Sig: Take 1 tablet (81 mg) by mouth daily   clopidogrel (PLAVIX) 75 MG tablet 4/19/2017 at Unknown time  No Yes   Sig: Take 1 tablet (75 mg) by mouth daily   insulin glargine (LANTUS) 100 UNIT/ML injection 4/20/2017 at Unknown time  Yes Yes   Sig: Inject 40 Units Subcutaneous daily   levothyroxine (SYNTHROID, LEVOTHROID) 137 MCG tablet  4/20/2017 at Unknown time Self Yes Yes   Sig: Take 137 mcg by mouth At Bedtime    lisinopril (PRINIVIL,ZESTRIL) 20 MG tablet 4/20/2017 at Unknown time  No Yes   Sig: Take 1 tablet (20 mg) by mouth daily   metFORMIN (GLUCOPHAGE) 1000 MG tablet 4/20/2017 at Unknown time  No Yes   Sig: Take 1 tablet (1,000 mg) by mouth 2 times daily (with meals)   metoprolol (TOPROL-XL) 100 MG 24 hr tablet 4/20/2017 at Unknown time  No Yes   Sig: Take 1 tablet (100 mg) by mouth At Bedtime   nitroglycerin (NITROSTAT) 0.4 MG SL tablet Unknown at Unknown time  No No   Sig: Place 1 tablet (0.4 mg) under the tongue every 5 minutes as needed for chest pain   pantoprazole (PROTONIX) 40 MG enteric coated tablet 4/20/2017 at Unknown time  No Yes   Sig: Take 1 tablet (40 mg) by mouth every morning (before breakfast)   simvastatin (ZOCOR) 40 MG tablet 4/20/2017 at Unknown time  No Yes   Sig: Take 1 tablet (40 mg) by mouth At Bedtime   sitagliptan (JANUVIA) 50 MG tablet 4/20/2017 at Unknown time  Yes Yes   Sig: Take 50 mg by mouth daily      Facility-Administered Medications: None     Allergies   Allergies   Allergen Reactions     No Known Allergies        Social History   ______________________________________________  Social History     Social History     Marital status:      Spouse name: N/A     Number of children: N/A     Years of education: N/A     Social History Main Topics     Smoking status: Former Smoker     Packs/day: 1.00     Years: 25.00     Types: Cigarettes     Quit date: 7/25/2005     Smokeless tobacco: Never Used     Alcohol use Yes      Comment: occasional     Drug use: No     Sexual activity: Yes     Partners: Female     Other Topics Concern     Parent/Sibling W/ Cabg, Mi Or Angioplasty Before 65f 55m? Yes     Caffeine Concern No     3 cups daily     Special Diet Yes     watching carb intake     Exercise No     some walking     Social History Narrative       Family History  "  ______________________________________________  Family History   Problem Relation Age of Onset     Cancer - colorectal Sister      Thyroid Disease Brother      Cardiovascular Brother      DIABETES Brother      CANCER Father      tumor in chest and throat     Arthritis Mother      Thyroid Disease Mother      DIABETES Mother        Review of Systems   ______________________________________________  A comprehensive review of  10 systems was performed and found to be negative except as described in this note  CONSTITUTIONAL: negative for fever, chills, change in weight  INTEGUMENTARY/SKIN: no rash or obvious new lesions  ENT/MOUTH: no sore throat, new sinus pain or nasal drainage, no neck mass noted  RESP: No pleuretic pain, No cough, no hemoptysis, No SOB   CV: negative for chest pain, palpitations or peripheral edema  GI: no nausea, vomiting, change in stools  : no dysuria or hematuria  MUSCULOSKELETAL: no myalgias, arthralgias or join efffusion  ENDOCRINE: no history of polyuria, polydyspsia or symptoms of thyroid dysfunction  PSYCHIATRIC: no change in mood stable  LYMPHATIC: no new lymphadenopathy  HEME: no bleeding or easy bruisability  NEURO: see HPI    Physical Exam   ______________________________________________  Weight:219 lbs 9.25 oz; Height:6' 3\"  Temp: 98.1  F (36.7  C) Temp src: Oral BP: 123/77 Pulse: 121 Heart Rate: 91 Resp: 16 SpO2: 95 % O2 Device: Nasal cannula Oxygen Delivery: 2 LPM  General Appearance:  No acute distress  Neuro:       Mental Status Exam:   Awake, alert, oriented X3. Speech and language are intact. Mental status is normal       Cranial Nerves: Right hemianopsia. Pupils 3 mm, reactive. EOMI. Face sensation is normal. Face is symmetric.Tongue and uvula are midline. Other CN are normal           Motor:  5/5 X 4. Tone and bulk are normal           Reflexes:  Normal DTR.Toes downgoing.        Sensory:  Normal to PP, LT, vibration and SELIN                   Coordination:   Intact " finger-to-nose and toe-to-finger tests       Gait:  Untestable  Neck: no nuchal rigidity, normal thyroid. No carotid bruits.    Cardiovascular: Regular rate and rhythm, no m/r/g  Lungs: Clear to auscultation  Abdomen: Soft, not tender, not distended  Extremities: No clubbing, no cyanosis, no edema    Data   ______________________________________________  All Data personally reviewed:       Labs:   CBC RESULTS:     Recent Labs  Lab 04/21/17 0440 04/20/17  2200   WBC 6.0 4.9   RBC 4.62 5.01   HGB 13.3 14.6   HCT 39.4* 43.2    213     Basic Metabolic Panel:   Recent Labs   Lab Test  04/21/17   0440  04/20/17   2200  03/31/17   1042   NA  140  140  134   POTASSIUM  3.3*  3.6  4.4   CHLORIDE  105  104  99   CO2  26  26  27   BUN  13  16  20   CR  0.72  0.90  0.83   GLC  233*  345*  533*   BETTIE  8.4*  8.7  8.8     Liver panel:  Recent Labs   Lab Test 03/04/14 11/30/11   0852  11/02/10   0900  03/16/09   0000  02/04/09   2150   PROTTOTAL   --    --   7.1   --   6.5*   ALBUMIN   --    --   4.4   --   4.2   BILITOTAL   --    --   0.3   --   0.4   ALKPHOS   --    --   72   --   63   AST   --    --   23   --   24   ALT  50*  30  33  39  41     INR:  Recent Labs   Lab Test  11/03/10   0940  02/11/09   1235  02/04/09   2150   INR  1.03  0.91  0.96      Lipid Profile:  Recent Labs   Lab Test  04/21/17   0440  06/30/16   0849  02/02/15   1016  11/14/13   0710  11/13/13   1540  11/30/11   0852  03/16/09   0000   CHOL  118  109  99  103  105  107   --    HDL  51  47  43  30*  34*  41  36*   LDL  55  47  42  57  58  53  36   TRIG  59  75  68  82  65  68  52   CHOLHDLRATIO   --    --   2.3  3.5  3.1  2.6  2.3     Thyroid Panel:  Recent Labs   Lab Test  04/21/17   0440 12/02/14 03/04/14 11/13/13   1540   TSH  3.28  1.80  2.23  1.58      Vitamin B12: No lab results found.   Vitamin D level: No lab results found.  A1C:   Recent Labs   Lab Test  03/31/17   1042 12/02/14 03/04/14 11/14/13   0710  02/05/09   0545   A1C  13.1*   10.5*  10.4*  9.2*  9.8*     Troponin I:   Recent Labs   Lab Test  04/21/17   0440  04/20/17   2200  11/13/13   1540  11/13/13   1128  01/31/12   1921  02/11/09   1955  02/11/09   1540  02/11/09   1235   TROPI  <0.015  The 99th percentile for upper reference range is 0.045 ug/L.  Troponin values in   the range of 0.045 - 0.120 ug/L may be associated with risks of adverse   clinical events.    <0.015  The 99th percentile for upper reference range is 0.045 ug/L.  Troponin values in   the range of 0.045 - 0.120 ug/L may be associated with risks of adverse   clinical events.    <0.012  <0.012  <0.012  0.022  0.031  0.030     Ammonia: No lab results found.  CK: No lab results found.     CRP inflammation:   Recent Labs   Lab Test  04/21/17   0440  06/21/16   1245   CRP  4.5  <2.9     ESR:   Recent Labs   Lab Test  06/21/16   1245   SED  5       KHADAR: No lab results found.    ANCA: No lab results found.   Drug Screen: No lab results found.  Alcohol level:No lab results found.  UA Results:  Recent Labs   Lab Test  01/31/12   1825   COLOR  Yellow   APPEARANCE  Clear   URINEGLC  70*   URINEBILI  Negative   URINEKETONE  Negative   SG  1.019   UBLD  Negative   URINEPH  5.0   PROTEIN  10*   NITRITE  Negative   LEUKEST  Negative   RBCU  1   WBCU  1     Most Recent 6 Bacteria Isolates From Any Culture (See EPIC Reports for Culture Details):No lab results found.     Cardiac US:   ---           Imaging:   All imaging studies were reviewed personally      Recent Results (from the past 24 hour(s))   MR Neck w/o & w Contrast Angiogram    Narrative    MRA ANGIOGRAM NECK W/O & W CONTRAST 4/21/2017 12:32 AM     HISTORY:  Evaluate for dissection, vertebrobasilar flow, carotid  stenosis    TECHNIQUE:  Sequential axial images of the neck were obtained using  2-dimensional time-of-flight before contrast and 3-dimensional  time-of-flight after the uneventful administration of 10 mL Gadavist  iv contrast.    COMPARISON:  None.    FINDINGS:  Stenosis is relative to the distal internal carotid  diameter.    Right Carotid:  No significant stenosis is seen at the bifurcation  relative to the distal internal carotid diameter.    Left Carotid:  No significant stenosis is seen at the bifurcation  relative to the distal internal carotid diameter.    Vertebrals: Antegrade flow is seen in both vertebral arteries.    No arterial dissection is identified.    There appear to be multiple thyroid nodules. A nodule in the left lobe  measures up to 3 cm in size.      Impression    IMPRESSION:   1. No stenosis is seen at either carotid bifurcation. No arterial  dissection.  2. Multiple thyroid nodules. Ultrasound would be helpful to further  characterize these nodules.    I agree with the preliminary report that was communicated to the  referring physician.   MR Brain w/o & w Contrast    Narrative    MR BRAIN W/O & W CONTRAST  4/21/2017 12:33 AM     HISTORY:  check for stroke or bleed    TECHNIQUE: Multiplanar, multisequence MRI of the brain without and  with 10 mL Gadavist IV contrast material.    COMPARISON: None.    FINDINGS:  Diffusion-weighted images reveal an area of restricted  diffusion in the left occipital region. This measures about 5.5 cm in  AP dimension by 3 cm in transverse dimension. On gradient-echo images,  there is susceptibility artifact throughout this infarct consistent  with hemorrhagic transformation. No mass effect. There is also a  small, 4 mm area of restricted diffusion in the white matter of the  left parietal lobe consistent with a small white matter infarct. There  is enlargement of the lateral ventricles and cortical sulci consistent  with atrophy. T2 hyperintensities are seen in the white matter  compatible small vessel ischemic change in this age patient.. There  are no gadolinium enhancing lesions.  The facial structures appear  normal.  The arteries at the base of the brain and the dural venous  sinuses appear patent.      Impression     IMPRESSION:   1. Left occipital infarct with hemorrhagic transformation. No mass  effect.  2. Small White matter infarct in the white matter of the left parietal  lobe.  3. Atrophy. White matter hyperintensities compatible small vessel  ischemic change.    I agree with the preliminary report that was communicated to the  referring physician.    MR Head w/o Contrast Angiogram    Narrative    MRA ANGIOGRAM HEAD W/O CONTRAST   4/21/2017 12:34 AM     HISTORY:  check for occlusion    TECHNIQUE:  3D time-of-flight MR angiogram of the head without  contrast.    COMPARISON: None.    FINDINGS: The visualized portions of the distal internal carotid and  vertebral arteries, the basilar artery, [place], and the  proximal anterior, middle and posterior cerebral arteries all appear  normal. There is no evidence of aneurysm or vascular stenosis or  occlusion.      Impression    IMPRESSION:  Negative, no occluded intracranial vessels. The left  posterior cerebral artery appears patent.    I agree with the preliminary report that was communicated to the  referring physician.

## 2017-04-21 NOTE — H&P
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 Patient with a history of coronary artery disease and had been compliant with both aspirin and Plavix prior to event. Does have significant risk factors including uncontrolled type 2 diabetes, vascular disease, hypertension, hyperlipidemia, history of smoking.  -Neurosurgery consulted in emergency department, anticipate nonoperative intervention  -Head of bed greater than 30   -N.p.o. pending speech pathology evaluation  -Neurology consulted  -Insulin drip as below  -Normal saline at 1 per hour  -Nicardipine drip to keep systolic blood pressure less than 140   -Repeat imaging timing as per neurology/neurosurgery  -Q.2 hours neuro checks  -TTE with bubble study  -Cardiac telemetry  -PT/OT/speech    New diagnosis atrial fibrillation with controlled ventricular rate: Likely source of patient's ischemic CVA as he was compliant with Plavix and aspirin.   -As below, prior to admission metoprolol has been held; monitor on cardiac telemetry  -All anticoagulants and antiplatelet agents have been held in the setting of hemorrhagic transformation of CVA  -Anticipate anticoagulation in approximately 6-8 weeks ending neurology assessment and recommendations.    Type 2 diabetes with peripheral neuropathy, uncontrolled: Most recent hemoglobin A1c of 13.1 3/31/17. Follows with endocrinology. Initiated on insulin approximately 2 weeks ago.  -Insulin drip at this time given recent neurologic insult  -Holding prior to admission insulin, Januvia, metformin    Coronary artery disease: Patient with multiple interventions including mid LAD SUSAN in 2005, SUSAN to proximal, mid LAD and [place] to  distal LAD in , restenosis with stent to LAD in . Primary cardiologist is Dr. Mckeon.  -Aspirin and Plavix currently on hold. Anticipate discontinuation of Plavix going forward  -As above, suspect initiation of anticoagulation (likely NOAC as pt declines warfarin an decreased risk of bleeding) In 6+ weeks pending neurology evaluation; likely aspirin will be resumed at that time.    Bilateral lower extremity foot pressure ulcerations: Patient with bilateral hallux bunions, distal toe necrosis from peripheral neuropathy and likely ill fitting footwear. No associated cellulitis is noted. Patient had planned for outpatient operative intervention through podiatry in the recent weeks, though this was on hold given uncontrolled diabetes.  -Outpatient podiatry follow-up as planned following recovery from subacute CVA as above  -Wound nurse consulted     Hypertension, hyperlipidemia:  -Holding prior to admission Zocor 40 mg daily given n.p.o. 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 States that his father  of a similar tumor. Unable to describe further.    DVT Prophylaxis: Mechanical prophylaxis    Code Status: Full Code    Disposition: Expected discharge in likely 3-5 days pending progress with therapies    [facility]    Primary Care Physician   Miesha Negron    Chief Complaint   Right eye visual changes    History is obtained from the patient, chart review, patient's wife and brother-in-law at bedside    History of Present Illness   Jayro Elder is a 66 year old male who presents with onset of left visual field aura of flashing lights noted  "4/18/17 while driving to visit family in [place]. Patient states that this was sudden in onset, not associated with any weakness or headache. This persisted for several hours. Patient was driving approximately 1.5 hours, though drove past his destination by approximately 10 miles, which was atypical. Arrived at family's home where he ate dinner, and subsequently slept for approximately 20 hours per family report. Patient does not believe this was the case. Patient had never had similar symptoms in the past. Family drove patient back to the [facility] where he was then evaluated for visual aura and found to have a subacute left occipital ischemic CVA with hemorrhagic transformation, admitted to hospitalist service with neurology and neurosurgery consultations. At time of my evaluation, patient is currently experiencing nausea, no headache, identifies a right sided visual field cut. Describes recently having an aura of what appeared to be \"the letter A\" to his right. Endorses compliance with his aspirin and Plavix.    Patient with multiple vascular risk factors for CVA including significant coronary artery disease, uncontrolled type 2 diabetes, hypertension, hyperlipidemia. Patient's blood pressure has been relatively well-controlled, no cardiac symptoms or complaints. Note that patient recently had a hemoglobin A1c that was obtained and 13.1. Subsequently met with endocrinology and was initiated on insulin with improvement in blood glucose. Patient has chronic foot ulcerations as well as bunions related to his pes planus. Plan had actually been for operative intervention by podiatry regarding this, though this was on hold as an outpatient given uncontrolled diabetes.      On evaluation in the emergency department, patient was found to have atrial fibrillation with controlled ventricular rate. No prior diagnosis of atrial fibrillation, though significant coronary artery disease with residual wall motion " abnormality. Last TTE in 2012 with mildly reduced ejection fraction in the 45% range. Given historical restenosis of stents, patient had been recommended by cardiology to continue on Plavix as well as aspirin. States he has been compliant with these medications. Patient is unaware of any palpitations, denies any chest pain.    Past Medical History    I have reviewed this patient's medical history and updated it with pertinent information if needed.   Past Medical History:   Diagnosis Date     CAD (coronary artery disease) 6/29/05    anterior MI,  PTCA and stent placed in mid LAD     Cardiomyopathy (H)      Essential hypertension, benign 11/13/2002     Generalized osteoarthrosis, unspecified site 11/13/2002     Myocardial infarction (H)      Neuropathy (H)      Tobacco use disorder 11/13/2002     Type II or unspecified type diabetes mellitus without mention of complication, not stated as uncontrolled 7/14/2005       Past Surgical History   I have reviewed this patient's surgical history and updated it with pertinent information if needed.  Past Surgical History:   Procedure Laterality Date     ANGIOGRAM  6/29/05    subtotal occ.mid LAD,SUSAN mid LAD     ANGIOGRAM  7/05    mild CAD,patent stent,no flow-limiting lesions,sev.LV dysf.LAD enlarged     ANGIOGRAM  2/09    Sev.single vessel disease,Mod LV dysf.distal LAD 90%,70-75% mid lad just before prev stent,SUSAN to prox.mid LAD, endeavor to distal LAD     ANGIOGRAM  11/13/13    restenosis, stent LAD     C NONSPECIFIC PROCEDURE      Laminectomy x 3 - (1983 x 2 & 1990)     C NONSPECIFIC PROCEDURE Bilateral 1998    Bunionectomy     C NONSPECIFIC PROCEDURE  1959    Gingival surgery at age 9       Prior to Admission Medications   Prior to Admission Medications   Prescriptions Last Dose Informant Patient Reported? Taking?   ONE TOUCH LANCETS MISC 4/20/2017 at Unknown time Self No Yes   Sig: use qid   ONE TOUCH ULTRA TEST VI STRP 4/20/2017 at Unknown time Self No Yes   Sig: use  qid   amLODIPine (NORVASC) 5 MG tablet 4/20/2017 at Unknown time  No Yes   Sig: Take 1 tablet (5 mg) by mouth daily   aspirin 81 MG EC tablet 4/20/2017 at Unknown time  No Yes   Sig: Take 1 tablet (81 mg) by mouth daily   clopidogrel (PLAVIX) 75 MG tablet 4/19/2017 at Unknown time  No Yes   Sig: Take 1 tablet (75 mg) by mouth daily   insulin glargine (LANTUS) 100 UNIT/ML injection 4/20/2017 at Unknown time  Yes Yes   Sig: Inject 40 Units Subcutaneous daily   levothyroxine (SYNTHROID, LEVOTHROID) 137 MCG tablet 4/20/2017 at Unknown time Self Yes Yes   Sig: Take 137 mcg by mouth At Bedtime    lisinopril (PRINIVIL,ZESTRIL) 20 MG tablet 4/20/2017 at Unknown time  No Yes   Sig: Take 1 tablet (20 mg) by mouth daily   metFORMIN (GLUCOPHAGE) 1000 MG tablet 4/20/2017 at Unknown time  No Yes   Sig: Take 1 tablet (1,000 mg) by mouth 2 times daily (with meals)   metoprolol (TOPROL-XL) 100 MG 24 hr tablet 4/20/2017 at Unknown time  No Yes   Sig: Take 1 tablet (100 mg) by mouth At Bedtime   nitroglycerin (NITROSTAT) 0.4 MG SL tablet Unknown at Unknown time  No No   Sig: Place 1 tablet (0.4 mg) under the tongue every 5 minutes as needed for chest pain   pantoprazole (PROTONIX) 40 MG enteric coated tablet 4/20/2017 at Unknown time  No Yes   Sig: Take 1 tablet (40 mg) by mouth every morning (before breakfast)   simvastatin (ZOCOR) 40 MG tablet 4/20/2017 at Unknown time  No Yes   Sig: Take 1 tablet (40 mg) by mouth At Bedtime   sitagliptan (JANUVIA) 50 MG tablet 4/20/2017 at Unknown time  Yes Yes   Sig: Take 50 mg by mouth daily      Facility-Administered Medications: None     Allergies   Allergies   Allergen Reactions     No Known Allergies        Social History   I have reviewed this patient's social history and updated it with pertinent information if needed. Jayro MESSINA Jael  reports that he quit smoking about 11 years ago. His smoking use included Cigarettes. He has a 25.00 pack-year smoking history. He has never used smokeless  tobacco. He reports that he drinks alcohol. He reports that he does not use illicit drugs.    Family History   I have reviewed this patient's family history and updated it with pertinent information if needed.   Family History   Problem Relation Age of Onset     Cancer - colorectal Sister      Thyroid Disease Brother      Cardiovascular Brother      DIABETES Brother      CANCER Father      tumor in chest and throat     Arthritis Mother      Thyroid Disease Mother      DIABETES Mother     no family history of CVA    Review of Systems   The 10 point Review of Systems is negative other than noted in the HPI or here.   Patient states he needs to urinate currently  Denies pain    Physical Exam   Temp: 97.8  F (36.6  C) Temp src: Oral BP: 130/76 Pulse: 84 Heart Rate: 92 Resp: 18 SpO2: 96 % O2 Device: None (Room air)    Vital Signs with Ranges  Temp:  [97.8  F (36.6  C)] 97.8  F (36.6  C)  Pulse:  [] 84  Heart Rate:  [92-95] 92  Resp:  [18-20] 18  BP: (151-157)/() 157/95  SpO2:  [96 %-98 %] 96 %  220 lbs 0 oz    Constitutional: no acute distress, alert, conversant  Eyes: no scleral icterus or injection. Mild anisocoria with right pupil slightly larger than left.  HEENT: moist mucous membranes  Respiratory: breath sounds clear bilaterally to auscultation, no wheezes, no crackles  Cardiovascular: regular rate and rhythm, no murmur. By telemetry currently in sinus rhythm.  GI: abdomen soft, non-tender, normoactive bowel sounds, no masses  Lymph/Hematologic: no lower extremity swelling  Genitourinary: not examined  Skin: Bilateral hallux bunions, necrosis of distal toes bilaterally likely related to chronic pressure injury  Musculoskeletal: muscular tone intact in all extremities  Neurologic: mental status grossly intact, alert, though somewhat sleepy. No facial dissymmetry grossly appreciated. Extraocular motions are intact, though patient does have a right visual field cut. Upper extremity strength intact  distally as well as proximally. No other focal neurologic deficits appreciated  Psychiatric: normal affect     Data   Data reviewed today:  I personally reviewed MRI demonstrating left occipital CVA.    Recent Labs  Lab 04/20/17  2200   WBC 4.9   HGB 14.6   MCV 86         POTASSIUM 3.6   CHLORIDE 104   CO2 26   BUN 16   CR 0.90   ANIONGAP 10   BETTIE 8.7   *   TROPI <0.015The 99th percentile for upper reference range is 0.045 ug/L.  Troponin values in the range of 0.045 - 0.120 ug/L may be associated with risks of adverse clinical events.       No results found for this or any previous visit (from the past 24 hour(s)).

## 2017-04-21 NOTE — PROGRESS NOTES
ICU Multi-Disciplinary Note  Patient condition reviewed and discussed while on multidisciplinary rounds today.   Please note these minor interventions that were initiated:  1. Pharmacy to clarify outpatient meds  2. Consider GI stress ulcer prophylaxis     The Critical Care service will continue to follow peripherally while patient is within the ICU. We are readily available should issues arise.  Please feel free to contact us for anything with which we may be of assistance.    Domingo Leung

## 2017-04-21 NOTE — PROGRESS NOTES
"   17 0900   General Information   Onset Date 17   Start of Care Date 17   Referring Physician Dr. Arriaga   Patient Profile Review/OT: Additional Occupational Profile Info See Profile for full history and prior level of function   Patient/Family Goals Statement to drink   Swallowing Evaluation Bedside swallow evaluation   Behaviorial Observations WFL (within functional limits)   Mode of current nutrition NPO   Respiratory Status Room air   Comments This 66 year old male who was admitted on 2017 and was found to have subacute left occipital CVA with new diagnosis of A-fib. Past history of CAD, HTN, DM II. 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 States that his father  of a similar tumor. Unable to describe further.\"    Clinical Swallow Evaluation   Oral Musculature generally intact   Structural Abnormalities none present   Dentition upper dentures;lower dentures;uses dentures to eat   Mucosal Quality dry   Mandibular Strength and Mobility intact   Oral Labial Strength and Mobility WFL;other (see comments)  (Minimal right side droop)   Lingual Strength and Mobility WFL   Velar Elevation intact   Buccal Strength and Mobility intact   Laryngeal Function Dry swallow palpated   Clinical Swallow Eval: Thin Liquid Texture Trial   Mode of Presentation, Thin Liquids cup;spoon;straw;self-fed   Oral Phase of Swallow WFL   Pharyngeal Phase of Swallow throat clearing;intact   Successful Strategies Trialed During Procedure (Small sips)   Diagnostic Statement Throat clear x1 noted with large serial gulp. No additional overt s/sx of aspiration or penetration noted.    Clinical Swallow Eval: Solid Food Texture Trial   Mode of Presentation, Solid self-fed   Oral Phase, Solid WFL   Pharyngeal Phase, Solid intact   Diagnostic Statement No overt s/sx of aspiration or penetration noted.    Esophageal Phase of Swallow " "  Patient reports or presents with symptoms of esophageal dysphagia No   Swallow Eval: Clinical Impressions   Skilled Criteria for Therapy Intervention Skilled criteria met.  Treatment indicated.   Functional Assessment Scale (FAS) 6   Treatment Diagnosis Oropharyngeal dysphagia; right visual neglect   Diet texture recommendations Regular diet;Thin liquids   Recommended Feeding/Eating Techniques maintain upright posture during/after eating for 30 mins;small sips/bites   Demonstrates Need for Referral to Another Service occupational therapy;dietitian   Therapy Frequency 3 times/wk   Predicted Duration of Therapy Intervention (days/wks) 1 week   Anticipated Discharge Disposition home w/ outpatient services   Risks and Benefits of Treatment have been explained. Yes   Patient, family and/or staff in agreement with Plan of Care Yes   Clinical Impression Comments Pt presents with minimal dysphagia that improves with use of compensatory strategies and reported moderate right side visual neglect. Pt reported history of avoiding dry chicken meat \"forever\" as he has difficulty swallowing this texture. Oral motor exam revealed minimal right facial droop that did not effect oral phase of swallow. Ice chips trialed with no overt s/sx of aspiration or penetration. Thin water via cup and straw trialed with pt taking large serial gulps as he reported being thirsty. Throat clear x1 noted with this. Vocal quality remaining clear. Additional trials with cues for small sips showed no overt s/sx of aspiration. Pt stated he doesn't typically take such large gulps and would be more aware of the size of sips. Regular texture cracker trialed with no overt s/sx of aspiration.  Recommended: regular diet, thin liquids, straws ok with use of small sips, upright with all intake. ST to follow for 1-2 meals to ensure diet tolerance and further train strategies. Pt expressed great concern with visual deficits as he cares for his grandchildren and " frequently drives. Pt educated on role of ST with visual neglect and plan to provide reading activities and possible outpatient ST to [place] as appropriate. Pt to discharge home with wife and family support.   Total Evaluation Time   Total Evaluation Time (Minutes) 15

## 2017-04-22 ENCOUNTER — APPOINTMENT (OUTPATIENT)
Dept: OCCUPATIONAL THERAPY | Facility: CLINIC | Age: 67
DRG: 065 | End: 2017-04-22
Payer: COMMERCIAL

## 2017-04-22 ENCOUNTER — APPOINTMENT (OUTPATIENT)
Dept: PHYSICAL THERAPY | Facility: CLINIC | Age: 67
DRG: 065 | End: 2017-04-22
Attending: INTERNAL MEDICINE
Payer: COMMERCIAL

## 2017-04-22 ENCOUNTER — APPOINTMENT (OUTPATIENT)
Dept: CT IMAGING | Facility: CLINIC | Age: 67
DRG: 065 | End: 2017-04-22
Attending: PSYCHIATRY & NEUROLOGY
Payer: COMMERCIAL

## 2017-04-22 LAB
ANION GAP SERPL CALCULATED.3IONS-SCNC: 7 MMOL/L (ref 3–14)
BUN SERPL-MCNC: 10 MG/DL (ref 7–30)
CALCIUM SERPL-MCNC: 8 MG/DL (ref 8.5–10.1)
CHLORIDE SERPL-SCNC: 108 MMOL/L (ref 94–109)
CO2 SERPL-SCNC: 27 MMOL/L (ref 20–32)
CREAT SERPL-MCNC: 0.64 MG/DL (ref 0.66–1.25)
ERYTHROCYTE [DISTWIDTH] IN BLOOD BY AUTOMATED COUNT: 13 % (ref 10–15)
GFR SERPL CREATININE-BSD FRML MDRD: ABNORMAL ML/MIN/1.7M2
GLUCOSE BLDC GLUCOMTR-MCNC: 103 MG/DL (ref 70–99)
GLUCOSE BLDC GLUCOMTR-MCNC: 119 MG/DL (ref 70–99)
GLUCOSE BLDC GLUCOMTR-MCNC: 125 MG/DL (ref 70–99)
GLUCOSE BLDC GLUCOMTR-MCNC: 89 MG/DL (ref 70–99)
GLUCOSE SERPL-MCNC: 112 MG/DL (ref 70–99)
HBA1C MFR BLD: 11.7 % (ref 4.3–6)
HCT VFR BLD AUTO: 36.3 % (ref 40–53)
HGB BLD-MCNC: 12.1 G/DL (ref 13.3–17.7)
MCH RBC QN AUTO: 28.7 PG (ref 26.5–33)
MCHC RBC AUTO-ENTMCNC: 33.3 G/DL (ref 31.5–36.5)
MCV RBC AUTO: 86 FL (ref 78–100)
PLATELET # BLD AUTO: 164 10E9/L (ref 150–450)
POTASSIUM SERPL-SCNC: 3.4 MMOL/L (ref 3.4–5.3)
RBC # BLD AUTO: 4.21 10E12/L (ref 4.4–5.9)
SODIUM SERPL-SCNC: 142 MMOL/L (ref 133–144)
WBC # BLD AUTO: 4 10E9/L (ref 4–11)

## 2017-04-22 PROCEDURE — 40000133 ZZH STATISTIC OT WARD VISIT: Performed by: REHABILITATION PRACTITIONER

## 2017-04-22 PROCEDURE — 85027 COMPLETE CBC AUTOMATED: CPT | Performed by: INTERNAL MEDICINE

## 2017-04-22 PROCEDURE — 40000193 ZZH STATISTIC PT WARD VISIT: Performed by: PHYSICAL THERAPIST

## 2017-04-22 PROCEDURE — 97161 PT EVAL LOW COMPLEX 20 MIN: CPT | Mod: GP | Performed by: PHYSICAL THERAPIST

## 2017-04-22 PROCEDURE — 36415 COLL VENOUS BLD VENIPUNCTURE: CPT | Performed by: INTERNAL MEDICINE

## 2017-04-22 PROCEDURE — 80048 BASIC METABOLIC PNL TOTAL CA: CPT | Performed by: INTERNAL MEDICINE

## 2017-04-22 PROCEDURE — 99232 SBSQ HOSP IP/OBS MODERATE 35: CPT | Performed by: HOSPITALIST

## 2017-04-22 PROCEDURE — 97535 SELF CARE MNGMENT TRAINING: CPT | Mod: GO | Performed by: REHABILITATION PRACTITIONER

## 2017-04-22 PROCEDURE — 97116 GAIT TRAINING THERAPY: CPT | Mod: GP | Performed by: PHYSICAL THERAPIST

## 2017-04-22 PROCEDURE — 70450 CT HEAD/BRAIN W/O DYE: CPT

## 2017-04-22 PROCEDURE — 00000146 ZZHCL STATISTIC GLUCOSE BY METER IP

## 2017-04-22 PROCEDURE — 25000132 ZZH RX MED GY IP 250 OP 250 PS 637: Mod: GY | Performed by: HOSPITALIST

## 2017-04-22 PROCEDURE — 83036 HEMOGLOBIN GLYCOSYLATED A1C: CPT | Performed by: INTERNAL MEDICINE

## 2017-04-22 PROCEDURE — A9270 NON-COVERED ITEM OR SERVICE: HCPCS | Mod: GY | Performed by: HOSPITALIST

## 2017-04-22 PROCEDURE — 25000125 ZZHC RX 250: Performed by: HOSPITALIST

## 2017-04-22 PROCEDURE — 25000131 ZZH RX MED GY IP 250 OP 636 PS 637: Mod: GY | Performed by: HOSPITALIST

## 2017-04-22 PROCEDURE — 99232 SBSQ HOSP IP/OBS MODERATE 35: CPT | Performed by: INTERNAL MEDICINE

## 2017-04-22 PROCEDURE — 25000125 ZZHC RX 250

## 2017-04-22 PROCEDURE — 99233 SBSQ HOSP IP/OBS HIGH 50: CPT | Performed by: PSYCHIATRY & NEUROLOGY

## 2017-04-22 PROCEDURE — 97530 THERAPEUTIC ACTIVITIES: CPT | Mod: GP | Performed by: PHYSICAL THERAPIST

## 2017-04-22 PROCEDURE — 12000000 ZZH R&B MED SURG/OB

## 2017-04-22 RX ORDER — AMOXICILLIN 250 MG
1-2 CAPSULE ORAL 2 TIMES DAILY PRN
Status: DISCONTINUED | OUTPATIENT
Start: 2017-04-22 | End: 2017-04-26 | Stop reason: HOSPADM

## 2017-04-22 RX ORDER — HYDRALAZINE HYDROCHLORIDE 20 MG/ML
INJECTION INTRAMUSCULAR; INTRAVENOUS
Status: COMPLETED
Start: 2017-04-22 | End: 2017-04-22

## 2017-04-22 RX ORDER — HYDRALAZINE HYDROCHLORIDE 20 MG/ML
10-20 INJECTION INTRAMUSCULAR; INTRAVENOUS
Status: DISCONTINUED | OUTPATIENT
Start: 2017-04-22 | End: 2017-04-26 | Stop reason: HOSPADM

## 2017-04-22 RX ORDER — HYDRALAZINE HYDROCHLORIDE 20 MG/ML
10-20 INJECTION INTRAMUSCULAR; INTRAVENOUS
Status: DISCONTINUED | OUTPATIENT
Start: 2017-04-22 | End: 2017-04-22

## 2017-04-22 RX ORDER — LABETALOL HYDROCHLORIDE 5 MG/ML
10-40 INJECTION, SOLUTION INTRAVENOUS EVERY 10 MIN PRN
Status: DISCONTINUED | OUTPATIENT
Start: 2017-04-22 | End: 2017-04-26 | Stop reason: HOSPADM

## 2017-04-22 RX ORDER — LABETALOL HYDROCHLORIDE 5 MG/ML
10-40 INJECTION, SOLUTION INTRAVENOUS EVERY 10 MIN PRN
Status: DISCONTINUED | OUTPATIENT
Start: 2017-04-22 | End: 2017-04-22

## 2017-04-22 RX ORDER — AMLODIPINE BESYLATE 5 MG/1
5 TABLET ORAL 2 TIMES DAILY
Status: DISCONTINUED | OUTPATIENT
Start: 2017-04-22 | End: 2017-04-26 | Stop reason: HOSPADM

## 2017-04-22 RX ADMIN — ACETAMINOPHEN 650 MG: 325 TABLET, FILM COATED ORAL at 17:56

## 2017-04-22 RX ADMIN — INSULIN GLARGINE 40 UNITS: 100 INJECTION, SOLUTION SUBCUTANEOUS at 17:57

## 2017-04-22 RX ADMIN — SIMVASTATIN 40 MG: 40 TABLET, FILM COATED ORAL at 22:31

## 2017-04-22 RX ADMIN — LISINOPRIL 20 MG: 20 TABLET ORAL at 22:30

## 2017-04-22 RX ADMIN — HYDRALAZINE HYDROCHLORIDE 10 MG: 20 INJECTION INTRAMUSCULAR; INTRAVENOUS at 13:10

## 2017-04-22 RX ADMIN — PANTOPRAZOLE SODIUM 40 MG: 40 TABLET, DELAYED RELEASE ORAL at 07:42

## 2017-04-22 RX ADMIN — METOPROLOL SUCCINATE 100 MG: 100 TABLET, EXTENDED RELEASE ORAL at 22:31

## 2017-04-22 RX ADMIN — LEVOTHYROXINE SODIUM 137 MCG: 137 TABLET ORAL at 22:30

## 2017-04-22 RX ADMIN — SENNOSIDES AND DOCUSATE SODIUM 2 TABLET: 8.6; 5 TABLET ORAL at 19:58

## 2017-04-22 RX ADMIN — SENNOSIDES AND DOCUSATE SODIUM 2 TABLET: 8.6; 5 TABLET ORAL at 11:36

## 2017-04-22 RX ADMIN — AMLODIPINE BESYLATE 5 MG: 5 TABLET ORAL at 19:57

## 2017-04-22 RX ADMIN — POTASSIUM CHLORIDE 20 MEQ: 1500 TABLET, EXTENDED RELEASE ORAL at 07:59

## 2017-04-22 RX ADMIN — ACETAMINOPHEN 650 MG: 325 TABLET, FILM COATED ORAL at 13:05

## 2017-04-22 RX ADMIN — AMLODIPINE BESYLATE 5 MG: 5 TABLET ORAL at 07:42

## 2017-04-22 RX ADMIN — HYDRALAZINE HYDROCHLORIDE 10 MG: 20 INJECTION INTRAMUSCULAR; INTRAVENOUS at 13:53

## 2017-04-22 ASSESSMENT — VISUAL ACUITY
OU: BLURRED VISION
OU: BLURRED VISION;GLASSES
OU: BLURRED VISION
OU: BLURRED VISION;GLASSES
OU: BLURRED VISION
OU: BLURRED VISION
OU: BLURRED VISION;GLASSES
OU: BLURRED VISION;GLASSES

## 2017-04-22 ASSESSMENT — PAIN DESCRIPTION - DESCRIPTORS
DESCRIPTORS: ACHING
DESCRIPTORS: HEADACHE
DESCRIPTORS: HEADACHE

## 2017-04-22 NOTE — PROGRESS NOTES
04/22/17 1344   Quick Adds   Type of Visit Initial PT Evaluation   Living Environment   Lives With spouse   Living Arrangements house   Home Accessibility stairs within home;bed not on first floor   Number of Stairs to Enter Home 1  (no railing)   Number of Stairs Within Home 12  (railing present)   Transportation Available car;family or friend will provide   Self-Care   Dominant Hand right   Usual Activity Tolerance good   Current Activity Tolerance moderate   Regular Exercise no   Equipment Currently Used at Home none  (has a walker if needed)   Activity/Exercise/Self-Care Comment Pt reports he's pretty active at baseline but doesn't do any regular exercise   Functional Level Prior   Ambulation 0-->independent   Transferring 0-->independent   Toileting 0-->independent   Bathing 0-->independent   Dressing 0-->independent   Eating 0-->independent   Communication 0-->understands/communicates without difficulty   Swallowing 0-->swallows foods/liquids without difficulty   Cognition 0 - no cognition issues reported   Fall history within last six months yes   Number of times patient has fallen within last six months 1  (tripping over dog)   Which of the above functional risks had a recent onset or change? ambulation;transferring;fall history   General Information   Onset of Illness/Injury or Date of Surgery - Date 04/20/17   Referring Physician Arriaga, Mateo Yee MD   Patient/Family Goals Statement None stated   General Observations R field cut   Cognitive Status Examination   Orientation orientation to person, place and time   Level of Consciousness alert   Follows Commands and Answers Questions 100% of the time   Personal Safety and Judgment intact   Memory intact   Pain Assessment   Patient Currently in Pain Yes, see Vital Sign flowsheet   Integumentary/Edema   Integumentary/Edema Comments Mild edema in distal LEs, reports he has some compression stockings at home but states that a physician recently told him not to  wear them.   Posture    Posture Forward head position   Range of Motion (ROM)   ROM Comment Grossly WFL in  BLEs   Strength   Strength Comments Grossly 4+ to 5/5 in B hip flexion, hip abduction, hip adduction, knee flexion, knee extension and ankle DF, no observed unilateral weakness or focal strength deficts on exam today   Bed Mobility   Bed Mobility Comments SBA-CGA for supine <> sit   Transfer Skills   Transfer Comments SBA-CGA for sit <> stand with B UE support   Gait   Gait Comments CGA-min A x 1 for gait wihtout AD due to lateral deviations in gait path   Balance   Balance Comments sitting balance WNL at EOB, impaired dynamic and static standing balance with positive rhomberg, LOB with modified tandem requiring min A , and CGA-min A for reciprocal marching. Pt also wtih poor weight shifts with perturbations in static standing with posterior balance check.   Sensory Examination   Sensory Perception Comments B LE neuropathy causing numbness/tinling in B feet up to knees   Coordination   Coordination Comments WFL   Muscle Tone   Muscle Tone Comments WFL   Clinical Impression   Criteria for Skilled Therapeutic Intervention yes, treatment indicated   PT Diagnosis unsteady gait and transfers and decreased tolerance to functional activities   Influenced by the following impairments balance impairments, fatigue, deconditioning, visual field cuts/deficits, sensory impairments   Functional limitations due to impairments unsteady gait and transfers and decreased tolerance to functional activities   Clinical Presentation Stable/Uncomplicated   Clinical Presentation Rationale VSS on RA, compliant to recs, supportive family, indep PLOF   Clinical Decision Making (Complexity) Low complexity   Therapy Frequency` daily   Predicted Duration of Therapy Intervention (days/wks) 5 days   Anticipated Equipment Needs at Discharge standard cane   Anticipated Discharge Disposition Home with Assist;Home with Home Therapy;Home with  "Outpatient Therapy  (pending progress/stair assessment)   Risk & Benefits of therapy have been explained Yes   Patient, Family & other staff in agreement with plan of care Yes   [facility] TM \"6 Clicks\"   2016, Trustees of [facility], under license to [employer].  All rights reserved.   6 Clicks Short Forms Basic Mobility Inpatient Short Form   [facility] AM-PAC  \"6 Clicks\" V.2 Basic Mobility Inpatient Short Form   1. Turning from your back to your side while in a flat bed without using bedrails? 4 - None   2. Moving from lying on your back to sitting on the side of a flat bed without using bedrails? 3 - A Little   3. Moving to and from a bed to a chair (including a wheelchair)? 3 - A Little   4. Standing up from a chair using your arms (e.g., wheelchair, or bedside chair)? 3 - A Little   5. To walk in hospital room? 3 - A Little   6. Climbing 3-5 steps with a railing? 3 - A Little   Basic Mobility Raw Score (Score out of 24.Lower scores equate to lower levels of function) 19   Total Evaluation Time   Total Evaluation Time (Minutes) 16     "

## 2017-04-22 NOTE — PROGRESS NOTES
[facility]    Cardiology Progress Note    Date of Service (when I saw the patient): 04/22/2017    Assessment & Plan   Jayro Elder is a 66 year old male who was admitted on 4/20/2017 with a left occipital stroke and hemorrhagic transformation.    #1 Left occipital stroke with hemorrhagic transformation, likely cardioembolic source  #2 Newly discovered atrial fibrillation, rate controlled  #3 CAD  #4 Chronic HFrEF (EF 45%), ischemic CM, mild symptoms  #5 Type 2 DM  #6 HTN  #7 HLD    -Holding anticoagulation and antiplatelets currently  -Neurology recommends considering anticoagulation in 4 weeks after re-evaluation of hemorrhage  -Continue metoprolol for rate control  -Amlodipine has been increased, if need further BP control consider increased lisinopril to 40 mg daily; if still hypertensive could consider switching metoprolol to carvedilol  -Also has some edema and few crackles, Lasix could be considered, although volume status will likely auto-equilibrate as this may have been from fluids given  -Pt says he is near his 1 year appointment with Dr. Mckeon; will try to get follow-up with him in around 4 weeks to coincide with Neuro re-evaluation and consideration of anticoagulation initiation    No other cardiac recs at this time. I will sign-off. Please call with any questions or concerns.      DVT Prophylaxis: VTE Prophylaxis contraindicated due to bleed  Code Status: Full Code    Chance Kyle Don MD      Interval History   Feels well. Headache is improved. Vision is improving. Blood pressure is generally well controlled but requiring hydralazine at times. Labs are unremarkable.     -Data reviewed today: I reviewed all new labs and imaging results over the last 24 hours.     Physical Exam   Temp: 98.6  F (37  C) Temp src: Oral BP: 143/86   Heart Rate: 73 Resp: 16 SpO2: 99 % O2 Device: None (Room air)    Vitals:    04/20/17 2141 04/21/17 0400 04/22/17 1434   Weight: 99.8 kg (220 lb) 99.6  kg (219 lb 9.3 oz) 103.9 kg (229 lb 0.9 oz)     Vital Signs with Ranges  Temp:  [97.6  F (36.4  C)-98.6  F (37  C)] 98.6  F (37  C)  Heart Rate:  [56-83] 73  Resp:  [10-31] 16  BP: (111-160)/() 143/86  FiO2 (%):  [60 %] 60 %  SpO2:  [92 %-99 %] 99 %  I/O last 3 completed shifts:  In: 1800 [I.V.:1800]  Out: 1150 [Urine:1150]    Constitutional: No apparent distress.   Eyes: No xanthelasma or conjunctivitis  Respiratory: A few faint crackles. Mostly clear.  Cardiovascular: Irregular, normal rate. No significant murmurs.   Extremities: 1+ peripheral edema.  Neurologic: Moving all extremities. No facial assymmetry.  Psychiatric: Alert and oriented. Answers questions appropriately.     Medications     - MEDICATION INSTRUCTIONS -         amLODIPine  5 mg Oral BID     levothyroxine  137 mcg Oral At Bedtime     lisinopril  20 mg Oral At Bedtime     metoprolol  100 mg Oral At Bedtime     pantoprazole  40 mg Oral QAM AC     simvastatin  40 mg Oral At Bedtime     insulin glargine  40 Units Subcutaneous Q24H     insulin aspart  1-7 Units Subcutaneous TID AC     insulin aspart  1-5 Units Subcutaneous At Bedtime       Data     Recent Labs  Lab 04/22/17  0435 04/21/17  1955 04/21/17  1232 04/21/17  0440 04/20/17  2200   WBC 4.0  --   --  6.0 4.9   HGB 12.1*  --   --  13.3 14.6   MCV 86  --   --  85 86     --   --  194 213     --   --  140 140   POTASSIUM 3.4 3.6  --  3.3* 3.6   CHLORIDE 108  --   --  105 104   CO2 27  --   --  26 26   BUN 10  --   --  13 16   CR 0.64*  --   --  0.72 0.90   ANIONGAP 7  --   --  9 10   BETTIE 8.0*  --   --  8.4* 8.7   *  --   --  233* 345*   TROPI  --   --  <0.015The 99th percentile for upper reference range is 0.045 ug/L.  Troponin values in the range of 0.045 - 0.120 ug/L may be associated with risks of adverse clinical events. <0.015The 99th percentile for upper reference range is 0.045 ug/L.  Troponin values in the range of 0.045 - 0.120 ug/L may be associated with risks of  adverse clinical events. <0.015The 99th percentile for upper reference range is 0.045 ug/L.  Troponin values in the range of 0.045 - 0.120 ug/L may be associated with risks of adverse clinical events.       Recent Results (from the past 24 hour(s))   CT Head w/o Contrast    Narrative    CT SCAN OF THE HEAD WITHOUT CONTRAST   4/22/2017 11:22 AM     HISTORY: ICH.    TECHNIQUE:  Axial images of the head and coronal reformations without  IV contrast material.  Radiation dose for this scan was reduced using  automated exposure control, adjustment of the mA and/or kV according  to patient size, or iterative reconstruction technique.    COMPARISON: MR dated 4/21/2017.    FINDINGS: Hemorrhagic left occipital infarct noted and unchanged when  compared to the MR study. There is some mild localized mass effect,  but no evidence for midline shift. Mild cerebral atrophy is present.  Patchy white matter changes are seen in both hemispheres. The left  occipital hemorrhagic infarct involves a small portion of the inferior  left parietal lobe as well. No new infarcts are present. There is no  evidence for any new hemorrhage or skull fracture. Visualized sinuses  and mastoid air cells are clear.      Impression    IMPRESSION: Stable left occipital hemorrhagic infarct with mild mass  effect, but no shift.    JOSLYN HAN MD

## 2017-04-22 NOTE — PLAN OF CARE
Problem: Goal Outcome Summary  Goal: Goal Outcome Summary  SLP: Pt seen briefly in room as other therapy (PT vs OT) was starting session. Pt reports no difficulty with PO intake and is willing to continue ST tomorrow. No major concerns with this pt from a swallowing perspective. ST to follow. Continue previous recommendations. Recommended: regular diet, thin liquids, straws ok with use of small sips, upright with all intake. ST to follow for 1-2 meals to ensure diet tolerance and further train strategies. Pt expressed great concern with visual deficits as he cares for his grandchildren and frequently drives. Pt educated on role of ST with visual neglect and plan to provide reading activities and possible outpatient ST to target as appropriate. Pt to discharge home with wife and family support.

## 2017-04-22 NOTE — PLAN OF CARE
Problem: Goal Outcome Summary  Goal: Goal Outcome Summary  PT: PT eval completed and treatment initiated. Pt lives in multi level home with his wife, has 1 step without railing to enter, flight of stairs to second level to reach bedroom with 1 railing. Previously indep with all mobility and ADLs without AD or DME. Pt currently demos moderate balance deficits both static and dynamic when standing unsupported. Ambulated x 175 ft with CGA-min A x 1 due to mild lateral gait deviations and occasional cross over stepping. Improved safety when provided with 1 UE support during gait. Close SBA-CGA for sit <> stands and bed mobility.   Recommend: home with family assist, likely benefit from SPC at discharge to address gait instability, as well as continued PT either OP or HH pending improvement in functional endurance and safe stair trial.

## 2017-04-22 NOTE — PLAN OF CARE
Problem: Goal Outcome Summary  Goal: Goal Outcome Summary  Outcome: Improving  A&o x4. Cms/neuros unchanged- R field cut, R eye blurry vision and pt reports shapes floating in R eye, slight R facial droop. VSS. Nicardipine weaned off, PTA meds restarted. Tele- a.fib w/ CVR. Insulin gtt weaned off. Up sba. Tolerating po. Voiding per urinal. Plan- f/u CT tomorrow. Pt's son updated at bedside, supportive.   Camille Sauer RN

## 2017-04-22 NOTE — PROGRESS NOTES
[facility]    Hospitalist Progress Note      Assessment & Plan   Jayro Elder is a 66 year old male who was admitted on 4/20/2017.  Past history of CAD, HTN, DM II who presents with visual aura, found to have occipita CVA with hemorrhagic transformation in addition to new diagnosis A-fib.    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  Primary suspicion is for embolic event in the setting of new diagnosis atrial fibrillation. Serial trop negative, CRP, TSH wnl, LDL at goal.  TTE 4/22 with EF 45% with mid to distal hypokinesis, negative bubble study  - SLP recommends reg diet, OT recommends outpatient OT, PT consult pending  - goal SBP <140, hydralazine and labetalol prn  - Q.4 hours neuro checks  - Neuro and NSG recs appreciated     New diagnosis atrial fibrillation with controlled ventricular rate: Likely source of patient's ischemic CVA as he was compliant with Plavix and aspirin.  Likely start apixaban in 4-6 weeks pending follow up imaging with Neuro  - no anticoagulants due to hemorrhage  - rate controlled with PTA metoprolol     Type 2 diabetes with peripheral neuropathy, uncontrolled: A1C 11.7% this admission. Follows with endocrinology. Initiated on insulin approximately 2 weeks ago.  - continue PTA Lantus with SSI  - Holding  PTA Januvia, metformin     Coronary artery disease, chronic systolic CHF: Patient with multiple interventions including mid LAD SUSAN in 2005, SUSAN to proximal, mid LAD and [place] to distal LAD in 2009, restenosis with stent to LAD in 2013. Primary cardiologist is Dr. Mckeon.  TTE as above.  - anticoagulation on hold, Plavix will be discontinued  - continue PTA metoprolol, lisinopril, simvastatin  - reported brief episode of dyspnea overnight and has few basilar crackles today, will stop IVF     Bilateral lower extremity foot pressure ulcerations: Patient with  bilateral hallux bunions, distal toe necrosis from peripheral neuropathy and likely ill fitting footwear. Patient had planned for outpatient operative intervention through podiatry in the recent weeks, though this was on hold given uncontrolled diabetes.  - Outpatient podiatry follow-up as planned following recovery from subacute CVA as above  - Wound nurse consulted      Hypertension, hyperlipidemia:  - continue PTA Zocor, metoprolol, amlodipine and lisinopril      Hypothyroidism:  Incidental thyroid nodules seen on admission MRI.  Thyroid US  with multinodular goiter.  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 States that his father  of a similar tumor. Unable to describe further.    DVT Prophylaxis: Pneumatic Compression Devices  Code Status: Full Code    Disposition: Expected discharge home in 1-2 days once cleared by Neuro and Neurosurg.  Transfer out of ICU when cleared by Neuro.    Orlando Tate    Interval History   Reports possible slight improvement in field cut and reports some improvement in headache.  Denies any focal paresthesias or weakness.  Brief episode of dyspnea overnight, reports he may have experienced some palpitations at the time.  Denies any chest pain/pressure or lightheadedness.  No lower extremity edema.    -Data reviewed today: I reviewed all new labs and imaging results over the last 24 hours. I personally reviewed no images or EKG's today.    Physical Exam   Temp: 98.2  F (36.8  C) Temp src: Oral BP: (!) 149/99   Heart Rate: 66 Resp: 16 SpO2: 98 % O2 Device: BiPAP/CPAP    Vitals:    17 2141 17 0400   Weight: 99.8 kg (220 lb) 99.6 kg (219 lb 9.3 oz)     Vital Signs with Ranges  Temp:  [97.7  F (36.5  C)-98.2  F (36.8  C)] 98.2  F (36.8  C)  Heart Rate:  [56-94] 66  Resp:  [10-31] 16  BP: (111-149)/(66-99) 149/99  FiO2 (%):  [60 %] 60 %  SpO2:  [92 %-99 %] 98  %  I/O last 3 completed shifts:  In: 3042.43 [P.O.:480; I.V.:2562.43]  Out: 2500 [Urine:2500]    Constitutional: Well developed, well nourished male in no acute distress  Respiratory: Few bibasilar crackles, no wheezing or tachypnea  Cardiovascular: irregularly irregular rhythm, normal rate, normal S1/S2 without murmur, rubs or gallops, no extremity edema  GI: abdomen soft, normal bowel sounds  Skin/Integumen:    Other:  alert and appropriate, right hemianopsia, cranial nerves otherwise intact    Medications     niCARdipine 40 mg in 200 mL 0.9% NaCl Stopped (04/21/17 1138)     NaCl 100 mL/hr at 04/21/17 4783     - MEDICATION INSTRUCTIONS -       insulin (regular) Stopped (04/21/17 1355)       amLODIPine  5 mg Oral Daily     levothyroxine  137 mcg Oral At Bedtime     lisinopril  20 mg Oral At Bedtime     metoprolol  100 mg Oral At Bedtime     pantoprazole  40 mg Oral QAM AC     simvastatin  40 mg Oral At Bedtime     insulin glargine  40 Units Subcutaneous Q24H     insulin aspart  1-7 Units Subcutaneous TID AC     insulin aspart  1-5 Units Subcutaneous At Bedtime       Data     Recent Labs  Lab 04/22/17  0435 04/21/17  1955 04/21/17  1232 04/21/17  0440 04/20/17  2200   WBC 4.0  --   --  6.0 4.9   HGB 12.1*  --   --  13.3 14.6   MCV 86  --   --  85 86     --   --  194 213     --   --  140 140   POTASSIUM 3.4 3.6  --  3.3* 3.6   CHLORIDE 108  --   --  105 104   CO2 27  --   --  26 26   BUN 10  --   --  13 16   CR 0.64*  --   --  0.72 0.90   ANIONGAP 7  --   --  9 10   BETTIE 8.0*  --   --  8.4* 8.7   *  --   --  233* 345*   TROPI  --   --  <0.015The 99th percentile for upper reference range is 0.045 ug/L.  Troponin values in the range of 0.045 - 0.120 ug/L may be associated with risks of adverse clinical events. <0.015The 99th percentile for upper reference range is 0.045 ug/L.  Troponin values in the range of 0.045 - 0.120 ug/L may be associated with risks of adverse clinical events. <0.015The 99th  percentile for upper reference range is 0.045 ug/L.  Troponin values in the range of 0.045 - 0.120 ug/L may be associated with risks of adverse clinical events.       Recent Results (from the past 24 hour(s))   US Thyroid    Narrative    THYROID ULTRASOUND   4/21/2017 12:28 PM     HISTORY: assess multiple thyroid nodules    COMPARISON: MRI of the neck dated 4/21/2017.    FINDINGS:      Right lobe: 4.8 x 2.6 x 2.1 cm.    Left lobe: 5.5 x 2.5 x 2.5 cm.    The background thyroid parenchyma is heterogeneous.    Thyroid nodules are identified as follows: The thyroid gland is  diffusely heterogeneous making it difficult to identify discrete  nodules.      Impression    IMPRESSION: Multinodular goiter.    PATRICE OLSON MD

## 2017-04-22 NOTE — PROGRESS NOTES
Neurology Progress Note    Subjective: Pt reports improvement of his headache from yesterday.  He states that he thinks his vision is improving from yesterday as well.  No other new concerns.    Vitals: Temp: 97.6  F (36.4  C) Temp  Min: 97.6  F (36.4  C)  Max: 98.2  F (36.8  C)  Resp: 17 Resp  Min: 10  Max: 31  SpO2: 98 % SpO2  Min: 92 %  Max: 99 %    No Data Recorded  Heart Rate: 80 Heart Rate  Min: 56  Max: 84  BP: (!) 160/98 Systolic (24hrs), Av , Min:111 , Max:160   Diastolic (24hrs), Av, Min:66, Max:120        Physical Examination:  General Exam: Awake, alert, NAD.  Neck: supple without meningismus.  CV:  Regular rate and rhythm.  Normal S1 and S2.  No carotid bruits.  Neuro:   HCF's:  Normal language, memory, and concentration.  Oriented x 3, normal fund of knowledge.   CN's:  Pt had a complete R homonymous hemianopsia.  His L VF were full to confrontation.  Pupils were 3 mm, reactive with no RAPD.  Fundoscopic examination limited by small pupils and pt compliance.  EOMI without nystagmus.  Facial sensation was normal.  Face was symmetrical without weakness.  Hearing was intact to finger rustle bilaterally.  Tongue protruded midline, palate ml symmetrically.  SCM's and traps were normal.   Motor:  Normal bulk, tone, and strength throughout.  MSR's 1+ and symmetrical.  Toes down-going to plantar stim bilaterally.   Sensory: intact to all modalities in all extremities.   Cerebellar: F to N and H to S normal.   Gait: deferred due to pending PT evaluation.    Imaging:  I reviewed the head CT images and agree with the report.  There is some small amount of edema, which appears c/w the MRI.    Impression: 65 y/o man with R homonymous hemianopsia due to L PCA distribution (L occipital lobe) hemorrhage.  This most likely represents an infarct with subsequent hemorrhagic transformation as pt was reportedly in a fib at presentation.  This certainly would be c/w a cardioembolic stroke.  No other source of  stroke identified in the work-up.    Recommendations: I agree with all the previous recommendations per Dr. Berry.  I agree with holding anticoagulation for now and reassessing in 4 weeks as an outpatient.  I think we should attempt to get good BP control (goal of normotension), but the most critical time for tight BP control is the first 12-24 hours.  Since the head CT showed no evidence of increased bleeding and pt is at least 24 hours out from the hemorrhage (and more likely 3-4 days out), the nicardipine drip is no longer necessary.  I see no reason why pt could not be transferred out of the ICU from a neurological standpoint.  The goal BP would still be a SBP < 140, but it would be ok to use PO meds with prn IV medications to control BP.  If pt consistently has SBP > 140, then we will need to readdress the treatment of his BP.

## 2017-04-22 NOTE — PLAN OF CARE
"Problem: Individualization  Goal: Patient Preferences  Outcome: No Change  A&O complains of H.A. X1 tylenol given with good effect.  Right field cut/ R facial droop remain.  Neurologically intact otherwise.  BP near or above 140 likely secondary to pt positioning, rechecks often times less than 140.  Blood sugars controlled.  Up to edge of bed and standing, gait steady.  Voids without difficulty.  Complains of \"hot teeth\".  Afebrile.          "

## 2017-04-22 NOTE — PLAN OF CARE
Problem: Goal Outcome Summary  Goal: Goal Outcome Summary  OT - Administered cognitive screen and vision screen. Pt only missing STM recall of objects on cognitive screen. Pt is spontaneously scanning to R but had a hard time finding midline with spatial orientation to the R in drawing task. Recommend home with A of wife as needed and outpatient occupational therapy to address vision.

## 2017-04-23 ENCOUNTER — APPOINTMENT (OUTPATIENT)
Dept: OCCUPATIONAL THERAPY | Facility: CLINIC | Age: 67
DRG: 065 | End: 2017-04-23
Payer: COMMERCIAL

## 2017-04-23 ENCOUNTER — APPOINTMENT (OUTPATIENT)
Dept: PHYSICAL THERAPY | Facility: CLINIC | Age: 67
DRG: 065 | End: 2017-04-23
Payer: COMMERCIAL

## 2017-04-23 ENCOUNTER — APPOINTMENT (OUTPATIENT)
Dept: SPEECH THERAPY | Facility: CLINIC | Age: 67
DRG: 065 | End: 2017-04-23
Payer: COMMERCIAL

## 2017-04-23 LAB
ANION GAP SERPL CALCULATED.3IONS-SCNC: 9 MMOL/L (ref 3–14)
BUN SERPL-MCNC: 10 MG/DL (ref 7–30)
CALCIUM SERPL-MCNC: 9 MG/DL (ref 8.5–10.1)
CHLORIDE SERPL-SCNC: 105 MMOL/L (ref 94–109)
CO2 SERPL-SCNC: 26 MMOL/L (ref 20–32)
CREAT SERPL-MCNC: 0.61 MG/DL (ref 0.66–1.25)
ERYTHROCYTE [DISTWIDTH] IN BLOOD BY AUTOMATED COUNT: 12.9 % (ref 10–15)
GFR SERPL CREATININE-BSD FRML MDRD: ABNORMAL ML/MIN/1.7M2
GLUCOSE BLDC GLUCOMTR-MCNC: 116 MG/DL (ref 70–99)
GLUCOSE BLDC GLUCOMTR-MCNC: 125 MG/DL (ref 70–99)
GLUCOSE BLDC GLUCOMTR-MCNC: 127 MG/DL (ref 70–99)
GLUCOSE BLDC GLUCOMTR-MCNC: 131 MG/DL (ref 70–99)
GLUCOSE BLDC GLUCOMTR-MCNC: 81 MG/DL (ref 70–99)
GLUCOSE SERPL-MCNC: 84 MG/DL (ref 70–99)
HCT VFR BLD AUTO: 39.4 % (ref 40–53)
HGB BLD-MCNC: 13.5 G/DL (ref 13.3–17.7)
MCH RBC QN AUTO: 29 PG (ref 26.5–33)
MCHC RBC AUTO-ENTMCNC: 34.3 G/DL (ref 31.5–36.5)
MCV RBC AUTO: 85 FL (ref 78–100)
PLATELET # BLD AUTO: 205 10E9/L (ref 150–450)
POTASSIUM SERPL-SCNC: 3.7 MMOL/L (ref 3.4–5.3)
RBC # BLD AUTO: 4.65 10E12/L (ref 4.4–5.9)
SODIUM SERPL-SCNC: 140 MMOL/L (ref 133–144)
WBC # BLD AUTO: 4.7 10E9/L (ref 4–11)

## 2017-04-23 PROCEDURE — A9270 NON-COVERED ITEM OR SERVICE: HCPCS | Mod: GY | Performed by: HOSPITALIST

## 2017-04-23 PROCEDURE — 92526 ORAL FUNCTION THERAPY: CPT | Mod: GN | Performed by: SPEECH-LANGUAGE PATHOLOGIST

## 2017-04-23 PROCEDURE — 85027 COMPLETE CBC AUTOMATED: CPT | Performed by: HOSPITALIST

## 2017-04-23 PROCEDURE — 97750 PHYSICAL PERFORMANCE TEST: CPT | Mod: GP | Performed by: PHYSICAL THERAPIST

## 2017-04-23 PROCEDURE — 40000133 ZZH STATISTIC OT WARD VISIT: Performed by: OCCUPATIONAL THERAPIST

## 2017-04-23 PROCEDURE — 97116 GAIT TRAINING THERAPY: CPT | Mod: GP | Performed by: PHYSICAL THERAPIST

## 2017-04-23 PROCEDURE — 80048 BASIC METABOLIC PNL TOTAL CA: CPT | Performed by: HOSPITALIST

## 2017-04-23 PROCEDURE — 40000225 ZZH STATISTIC SLP WARD VISIT: Performed by: SPEECH-LANGUAGE PATHOLOGIST

## 2017-04-23 PROCEDURE — 97535 SELF CARE MNGMENT TRAINING: CPT | Mod: GO | Performed by: OCCUPATIONAL THERAPIST

## 2017-04-23 PROCEDURE — 25000132 ZZH RX MED GY IP 250 OP 250 PS 637: Mod: GY | Performed by: HOSPITALIST

## 2017-04-23 PROCEDURE — 12000000 ZZH R&B MED SURG/OB

## 2017-04-23 PROCEDURE — 99232 SBSQ HOSP IP/OBS MODERATE 35: CPT | Performed by: HOSPITALIST

## 2017-04-23 PROCEDURE — 40000193 ZZH STATISTIC PT WARD VISIT: Performed by: PHYSICAL THERAPIST

## 2017-04-23 PROCEDURE — 97530 THERAPEUTIC ACTIVITIES: CPT | Mod: GO | Performed by: OCCUPATIONAL THERAPIST

## 2017-04-23 PROCEDURE — 25000131 ZZH RX MED GY IP 250 OP 636 PS 637: Mod: GY | Performed by: HOSPITALIST

## 2017-04-23 PROCEDURE — 36415 COLL VENOUS BLD VENIPUNCTURE: CPT | Performed by: HOSPITALIST

## 2017-04-23 PROCEDURE — 99232 SBSQ HOSP IP/OBS MODERATE 35: CPT | Performed by: PSYCHIATRY & NEUROLOGY

## 2017-04-23 PROCEDURE — 00000146 ZZHCL STATISTIC GLUCOSE BY METER IP

## 2017-04-23 RX ORDER — LISINOPRIL 40 MG/1
40 TABLET ORAL AT BEDTIME
Status: DISCONTINUED | OUTPATIENT
Start: 2017-04-23 | End: 2017-04-26 | Stop reason: HOSPADM

## 2017-04-23 RX ADMIN — PANTOPRAZOLE SODIUM 40 MG: 40 TABLET, DELAYED RELEASE ORAL at 08:49

## 2017-04-23 RX ADMIN — AMLODIPINE BESYLATE 5 MG: 5 TABLET ORAL at 21:16

## 2017-04-23 RX ADMIN — INSULIN GLARGINE 40 UNITS: 100 INJECTION, SOLUTION SUBCUTANEOUS at 17:38

## 2017-04-23 RX ADMIN — ACETAMINOPHEN 650 MG: 325 TABLET, FILM COATED ORAL at 09:08

## 2017-04-23 RX ADMIN — LEVOTHYROXINE SODIUM 137 MCG: 137 TABLET ORAL at 21:16

## 2017-04-23 RX ADMIN — ACETAMINOPHEN 650 MG: 325 TABLET, FILM COATED ORAL at 14:42

## 2017-04-23 RX ADMIN — SIMVASTATIN 40 MG: 40 TABLET, FILM COATED ORAL at 21:16

## 2017-04-23 RX ADMIN — METOPROLOL SUCCINATE 100 MG: 100 TABLET, EXTENDED RELEASE ORAL at 21:16

## 2017-04-23 RX ADMIN — LISINOPRIL 40 MG: 40 TABLET ORAL at 21:16

## 2017-04-23 RX ADMIN — AMLODIPINE BESYLATE 5 MG: 5 TABLET ORAL at 08:49

## 2017-04-23 ASSESSMENT — VISUAL ACUITY
OU: DOUBLE VISION/DIPLOPIA
OU: GLASSES;OTHER (SEE COMMENT)
OU: DOUBLE VISION/DIPLOPIA
OU: GLASSES;OTHER (SEE COMMENT)

## 2017-04-23 NOTE — PLAN OF CARE
"Problem: Goal Outcome Summary  Goal: Goal Outcome Summary  Outcome: No Change  RN: Alert and oriented X 4, up with CGA. R visual field cut noted with blurred R sided vision and intermittent \"floaters\". Needs reminders to look to R side when ambulating. Mild R sided facial droop. A-fib with CVR on tele.Tolerating CCHO diet. BP controlled, remained <140 with scheduled amlodipine, Lisinopril, Metoprolol. BG controlled with Lantus, no SSI required. Foot cares completed. Bunions, necrotic tissue intact. IV SL.       "

## 2017-04-23 NOTE — PROGRESS NOTES
Neurology Progress Note    Subjective: Per pt, he is about the same today.  Yesterday he thought he was having some improvement of vision, but today he is realizing that it is the same as it was from the beginning.  He seems to have more awareness of his deficit than he did previously.    Vitals: Temp: 97.4  F (36.3  C) Temp  Min: 97.4  F (36.3  C)  Max: 98.6  F (37  C)  Resp: 18 Resp  Min: 16  Max: 18  SpO2: 97 % SpO2  Min: 92 %  Max: 99 %  Pulse: 64 Pulse  Min: 64  Max: 80  Heart Rate: 71 Heart Rate  Min: 71  Max: 82  BP: (!) 140/91 Systolic (24hrs), Av , Min:125 , Max:160   Diastolic (24hrs), Av, Min:85, Max:120        Physical Examination:  General Exam: Awake, alert, NAD.  Neck: supple without meningismus.  CV: Regular rate and rhythm. Normal S1 and S2. No carotid bruits.  Neuro:   HCF's: Normal language, memory, and concentration. Oriented x 3, normal fund of knowledge.   CN's: Pt had a complete R homonymous hemianopsia. His L VF were full to confrontation. Pupils were 3 mm, reactive with no RAPD. Fundoscopic examination limited by small pupils and pt compliance. EOMI without nystagmus. Facial sensation was normal. Face was symmetrical without weakness. Hearing was intact to finger rustle bilaterally. Tongue protruded midline, palate ml symmetrically. SCM's and traps were normal.   Motor: Normal bulk, tone, and strength throughout. MSR's 1+ and symmetrical. Toes down-going to plantar stim bilaterally.   Sensory: intact to all modalities in all extremities.   Cerebellar: F to N and H to S normal.   Gait: normal station.  Gait slow, but steady.  Pt was cautious due to vision deficit, but no obvious deficits in gait.     Impression: 67 y/o man with R homonymous hemianopsia due to L PCA distribution (L occipital lobe) hemorrhage. This most likely represents an infarct with subsequent hemorrhagic transformation as pt was reportedly in a fib at presentation. This certainly would be c/w a cardioembolic  stroke. No other source of stroke identified in the work-up.  Since this is likely a hemorrhagic conversion of an ischemic infarct, we cannot know how long the blood has been there, but now it has been documented to be present for 2 1/2 days.  No evidence of extension of the bleeding and no clinical evidence of any worsening.      Recommendations: Continue to with hold anticoagulation for now and reassess as an outpatient 4 weeks from onset.  Continue good BP control (goal of normotension).  BPs have been fairly well-controlled on oral meds with prn, so could continue with these for now.  Continue goal SBP < 140.  Pt just began PT/OT today, so if he continues to have no clinical worsening by tomorrow (which would be 3 1/2 days from known hemorrhage), and his BP is controlled, then it would be reasonable to consider the next transition (home vs rehab per PT/OT).  Pt is eager to get out of the hospital.

## 2017-04-23 NOTE — PLAN OF CARE
Problem: Goal Outcome Summary  Goal: Goal Outcome Summary  SLP: Swallow tx completed this AM. Pt presents with normal oral and pharyngeal swallowing function. Functional mastication w/ regular textures and no overt s/sx of aspiration w/ all PO. Pt appropriately using safe swallowing strategies. No further dysphagia tx is warranted as pt has met all goals. Recommend continue regular diet, thin liquids. General swallowing precautions include upright and alert, small bites/sips, regular oral cares. Pending POC, will consider completing cognitive-communication evaluation tomorrow given pt's ongoing report of visual deficits and visual neglect. Pt to discharge home with wife and family support; consider OP SLP to address visual deficits in relation to communication skills (reading, writing).

## 2017-04-23 NOTE — PROGRESS NOTES
04/23/17 1019   Signing Clinician's Name / Credentials   Signing clinician's name / credentials Elo Serrano PT, DPT   Alfaro Balance Scale (WIL JAMIL, ERNST S, MARTIN ANGELO, ADAN DELACRUZ: MEASURING BALANCE IN THE ELDERLY: VALIDATION OF AN INSTRUMENT. CAN. J. PUB. HEALTH, JULY/AUGUST SUPPLEMENT 2:S7-11, 1992.)   Sit To Stand 3   Standing Unsupported 4   Sitting Unsupported 4   Stand to Sit 3   Transfers 3   Standing with Eyes Closed 4   Standing Unsupported, Feet Together 3   Reach Forward With Outstretched Arm 4   Retrieve Object From Floor 4   Turning to Look Behind 2   Turn 360 Degrees 4   Placing Alternate Foot on Stool (4-6 inches) 1   Unsupported Tandem Stand (Demonstrate to Subject) 0   One Leg Stand 1   Total Score (A score of 45 or less has been correlated with an increased risk of falls)   Total Score (out of 56) 40     Alfaro Balance Scale (BBS) Cutoff Scores: A score of ? 45/56 indicates an increased risk for falls.   Patient Score: 40/56    The BBS is a measure of static and dynamic standing balance that has been validated in community dwelling elderly individuals and individuals who have Parkinson's Disease, MS, and those who are s/p CVA and TBI. The test is administered without an assistive device. Scores from the Alfaro are used to determine the probability of falling based on the patient's previous history of falls and their test performance.     Minimal Detectable Change = 6.5   & Minimal Detectable Change (Parkinson's Disease) = 5 according to Aurelio & Lukeey 2008    Assessment (rationale for performing, application to patient s function & care plan): Impaired balance  Minutes billed as physical performance test: 13

## 2017-04-23 NOTE — PLAN OF CARE
Problem: Goal Outcome Summary  Goal: Goal Outcome Summary  Outcome: No Change  A&Ox4, VSS, NV: slight R. Facial droop, c/o dbl. And blurred vision, improved for second assessment, R. Field cut, slow/del. Finger to nose RUE., RLE limp. BLE baseline numbness d/t neuropathy  Tylenol given for HA, rating it @ 1-2/10. Tele A-fib CVR and BBB. BG 88 & 127. Bilateral toe ulcers TELMA, necrotic, CDI, wound care done per WOC. Up with SBA, thelma. CHO diet, Voiding in BR, lg, BMx1 today. D/C plan pending.      [id number]  NV: unchanged, , Tylenol given for HA of 1/10. Spouse at bedside. Plan to start new increased dose of lisinopril this robe. Plan for D/C upon BP control.

## 2017-04-23 NOTE — PROGRESS NOTES
[facility]    Hospitalist Progress Note      Assessment & Plan   Jayro Elder is a 66 year old male who was admitted on 4/20/2017.  Past history of CAD, HTN, DM II who presents with visual aura, found to have occipita CVA with hemorrhagic transformation in addition to new diagnosis A-fib.    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  Primary suspicion is for embolic event in the setting of new diagnosis atrial fibrillation.  CT head 4/22 stable.  Serial trop negative, CRP, TSH wnl, LDL at goal.  TTE 4/22 with EF 45% with mid to distal hypokinesis, negative bubble study.  - SLP recommends reg diet, OT/PT recommend outpatient therapy   - goal SBP <140, hydralazine and labetalol prn (BP management as below)  - Q.4 hours neuro checks  - Neuro and NSG recs appreciated     New diagnosis atrial fibrillation with controlled ventricular rate: Likely source of patient's ischemic CVA as he was compliant with Plavix and aspirin.  Likely start apixaban in 4-6 weeks pending follow up imaging with Neuro  - no anticoagulants due to hemorrhage  - rate controlled with PTA metoprolol     Type 2 diabetes with peripheral neuropathy, uncontrolled: A1C 11.7% this admission. Follows with endocrinology. Initiated on insulin approximately 2 weeks ago.  - continue PTA Lantus with SSI  - Holding PTA Januvia, metformin     Coronary artery disease, chronic systolic CHF: Patient with multiple interventions including mid LAD SUSAN in 2005, SUSAN to proximal, mid LAD and [place] to distal LAD in 2009, restenosis with stent to LAD in 2013. Primary cardiologist is Dr. Mckeon.  TTE as above.  - anticoagulation on hold, Plavix will be discontinued  - continue PTA metoprolol, simvastatin  - Cards signed off 4/22, follow up in about 4 weeks    Hypertension, hyperlipidemia:  - continue PTA Zocor, metoprolol  - amlodipine  increased to 5 mg bid on   - remains hypertensive, will increase lisiniopril to 40 mg daily       Bilateral lower extremity foot pressure ulcerations: Patient with bilateral hallux bunions, distal toe necrosis from peripheral neuropathy and likely ill fitting footwear. Patient had planned for outpatient operative intervention through podiatry in the recent weeks, though this was on hold given uncontrolled diabetes.  - Outpatient podiatry follow-up as planned following recovery from subacute CVA as above  - Wound nurse consulted      Hypothyroidism:  Incidental thyroid nodules seen on admission MRI.  Thyroid US  with multinodular goiter.  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 States that his father  of a similar tumor. Unable to describe further.    DVT Prophylaxis: Pneumatic Compression Devices  Code Status: Full Code    Disposition: Expected discharge home in 1-2 days pending adequate blood pressure control.    Orlando Madridstacey    Interval History   Reports no change in vision.  Reports intermittent episodes of sharp chest pain associated with diaphoresis which he has not reported to any nursing staff.  States he has felt more short of breath recently with activity.  Pain is not similar to prior MI.  Denies fever/chills, dyspnea, nausea or any pain currently, no other complaints.    -Data reviewed today: I reviewed all new labs and imaging results over the last 24 hours. I personally reviewed no images or EKG's today.    Physical Exam   Temp: 97.4  F (36.3  C) Temp src: Oral BP: (!) 142/93 Pulse: 65 Heart Rate: 71 Resp: 18 SpO2: 97 % O2 Device: None (Room air)    Vitals:    17 2141 17 0400 17 1434   Weight: 99.8 kg (220 lb) 99.6 kg (219 lb 9.3 oz) 103.9 kg (229 lb 0.9 oz)     Vital Signs with Ranges  Temp:  [97.4  F (36.3  C)-98.6  F (37  C)] 97.4  F (36.3  C)  Pulse:   [64-80] 65  Heart Rate:  [71-82] 71  Resp:  [16-18] 18  BP: (125-150)/() 142/93  SpO2:  [96 %-99 %] 97 %  I/O last 3 completed shifts:  In: 400 [P.O.:200; I.V.:200]  Out: -     Constitutional: Well developed, well nourished male in no acute distress  Respiratory: Lungs clear bilaterally, no crackles, no wheezing or tachypnea  Cardiovascular: irregularly irregular rhythm, normal rate, normal S1/S2 without murmur, rubs or gallops, no extremity edema  GI: abdomen soft, normal bowel sounds  Skin/Integumen:    Other:  alert and appropriate, right hemianopsia, cranial nerves otherwise intact    Medications     - MEDICATION INSTRUCTIONS -         lisinopril  40 mg Oral At Bedtime     amLODIPine  5 mg Oral BID     levothyroxine  137 mcg Oral At Bedtime     metoprolol  100 mg Oral At Bedtime     pantoprazole  40 mg Oral QAM AC     simvastatin  40 mg Oral At Bedtime     insulin glargine  40 Units Subcutaneous Q24H     insulin aspart  1-7 Units Subcutaneous TID AC     insulin aspart  1-5 Units Subcutaneous At Bedtime       Data     Recent Labs  Lab 04/23/17  0747 04/22/17  0435 04/21/17  1955 04/21/17  1232 04/21/17  0440 04/20/17  2200   WBC 4.7 4.0  --   --  6.0 4.9   HGB 13.5 12.1*  --   --  13.3 14.6   MCV 85 86  --   --  85 86    164  --   --  194 213    142  --   --  140 140   POTASSIUM 3.7 3.4 3.6  --  3.3* 3.6   CHLORIDE 105 108  --   --  105 104   CO2 26 27  --   --  26 26   BUN 10 10  --   --  13 16   CR 0.61* 0.64*  --   --  0.72 0.90   ANIONGAP 9 7  --   --  9 10   BETTIE 9.0 8.0*  --   --  8.4* 8.7   GLC 84 112*  --   --  233* 345*   TROPI  --   --   --  <0.015The 99th percentile for upper reference range is 0.045 ug/L.  Troponin values in the range of 0.045 - 0.120 ug/L may be associated with risks of adverse clinical events. <0.015The 99th percentile for upper reference range is 0.045 ug/L.  Troponin values in the range of 0.045 - 0.120 ug/L may be associated with risks of adverse clinical  events. <0.015The 99th percentile for upper reference range is 0.045 ug/L.  Troponin values in the range of 0.045 - 0.120 ug/L may be associated with risks of adverse clinical events.       No results found for this or any previous visit (from the past 24 hour(s)).

## 2017-04-23 NOTE — PLAN OF CARE
Problem: Goal Outcome Summary  Goal: Goal Outcome Summary  OT: Pt completed reaching tasks with dynamic balance challenges with CGA. Pt completed standing G/H tasks with SBA. While ambulating and completing a standing task at table top pt had two episodes of LOB, pt was using no AE at the time, recommend use of AD for balance. Pt participated in ed for visual scanning techniques, requires VC throughout session to implement. Pt given ed and cues on using physical touch for orientation in space, and for visual/hand coordination for increased accuracy with depth perception. Recommend discharge home with assist of wife and OP Vision therapy

## 2017-04-23 NOTE — PLAN OF CARE
"Problem: Goal Outcome Summary  Goal: Goal Outcome Summary  Outcome: No Change  A&OX 4. VSS. Up with Contact Guard Assist due to R visual field cut noted with blurred R sided vision and intermittent \"floaters\". Needs reminders to look to R side when ambulating. Mild R sided facial droop. Tele: Afib with CVR. Diet: Consistant carb. Bilateral feet bunions, necrotic tissue intact.          "

## 2017-04-24 ENCOUNTER — APPOINTMENT (OUTPATIENT)
Dept: SPEECH THERAPY | Facility: CLINIC | Age: 67
DRG: 065 | End: 2017-04-24
Payer: COMMERCIAL

## 2017-04-24 ENCOUNTER — APPOINTMENT (OUTPATIENT)
Dept: CT IMAGING | Facility: CLINIC | Age: 67
DRG: 065 | End: 2017-04-24
Attending: HOSPITALIST
Payer: COMMERCIAL

## 2017-04-24 ENCOUNTER — APPOINTMENT (OUTPATIENT)
Dept: OCCUPATIONAL THERAPY | Facility: CLINIC | Age: 67
DRG: 065 | End: 2017-04-24
Payer: COMMERCIAL

## 2017-04-24 LAB
ANION GAP SERPL CALCULATED.3IONS-SCNC: 7 MMOL/L (ref 3–14)
BUN SERPL-MCNC: 10 MG/DL (ref 7–30)
CALCIUM SERPL-MCNC: 8.8 MG/DL (ref 8.5–10.1)
CHLORIDE SERPL-SCNC: 104 MMOL/L (ref 94–109)
CO2 SERPL-SCNC: 30 MMOL/L (ref 20–32)
CREAT SERPL-MCNC: 0.81 MG/DL (ref 0.66–1.25)
ERYTHROCYTE [DISTWIDTH] IN BLOOD BY AUTOMATED COUNT: 12.8 % (ref 10–15)
GFR SERPL CREATININE-BSD FRML MDRD: ABNORMAL ML/MIN/1.7M2
GLUCOSE BLDC GLUCOMTR-MCNC: 111 MG/DL (ref 70–99)
GLUCOSE BLDC GLUCOMTR-MCNC: 117 MG/DL (ref 70–99)
GLUCOSE BLDC GLUCOMTR-MCNC: 128 MG/DL (ref 70–99)
GLUCOSE BLDC GLUCOMTR-MCNC: 209 MG/DL (ref 70–99)
GLUCOSE BLDC GLUCOMTR-MCNC: 72 MG/DL (ref 70–99)
GLUCOSE SERPL-MCNC: 120 MG/DL (ref 70–99)
HCT VFR BLD AUTO: 41 % (ref 40–53)
HGB BLD-MCNC: 14 G/DL (ref 13.3–17.7)
MCH RBC QN AUTO: 29.1 PG (ref 26.5–33)
MCHC RBC AUTO-ENTMCNC: 34.1 G/DL (ref 31.5–36.5)
MCV RBC AUTO: 85 FL (ref 78–100)
PLATELET # BLD AUTO: 217 10E9/L (ref 150–450)
POTASSIUM SERPL-SCNC: 3.7 MMOL/L (ref 3.4–5.3)
RBC # BLD AUTO: 4.81 10E12/L (ref 4.4–5.9)
SODIUM SERPL-SCNC: 141 MMOL/L (ref 133–144)
TROPONIN I SERPL-MCNC: NORMAL UG/L (ref 0–0.04)
WBC # BLD AUTO: 4.5 10E9/L (ref 4–11)

## 2017-04-24 PROCEDURE — 80048 BASIC METABOLIC PNL TOTAL CA: CPT | Performed by: HOSPITALIST

## 2017-04-24 PROCEDURE — 99232 SBSQ HOSP IP/OBS MODERATE 35: CPT | Performed by: PSYCHIATRY & NEUROLOGY

## 2017-04-24 PROCEDURE — 12000000 ZZH R&B MED SURG/OB

## 2017-04-24 PROCEDURE — 92523 SPEECH SOUND LANG COMPREHEN: CPT | Mod: GN | Performed by: SPEECH-LANGUAGE PATHOLOGIST

## 2017-04-24 PROCEDURE — 25000131 ZZH RX MED GY IP 250 OP 636 PS 637: Mod: GY | Performed by: HOSPITALIST

## 2017-04-24 PROCEDURE — 25500064 ZZH RX 255 OP 636: Performed by: INTERNAL MEDICINE

## 2017-04-24 PROCEDURE — 40000225 ZZH STATISTIC SLP WARD VISIT: Performed by: SPEECH-LANGUAGE PATHOLOGIST

## 2017-04-24 PROCEDURE — 25000132 ZZH RX MED GY IP 250 OP 250 PS 637: Mod: GY | Performed by: HOSPITALIST

## 2017-04-24 PROCEDURE — 97530 THERAPEUTIC ACTIVITIES: CPT | Mod: GO | Performed by: OCCUPATIONAL THERAPY ASSISTANT

## 2017-04-24 PROCEDURE — 71260 CT THORAX DX C+: CPT

## 2017-04-24 PROCEDURE — 85027 COMPLETE CBC AUTOMATED: CPT | Performed by: HOSPITALIST

## 2017-04-24 PROCEDURE — 40000133 ZZH STATISTIC OT WARD VISIT: Performed by: OCCUPATIONAL THERAPY ASSISTANT

## 2017-04-24 PROCEDURE — 25000125 ZZHC RX 250: Performed by: INTERNAL MEDICINE

## 2017-04-24 PROCEDURE — 00000146 ZZHCL STATISTIC GLUCOSE BY METER IP

## 2017-04-24 PROCEDURE — 93005 ELECTROCARDIOGRAM TRACING: CPT

## 2017-04-24 PROCEDURE — 25000132 ZZH RX MED GY IP 250 OP 250 PS 637: Mod: GY

## 2017-04-24 PROCEDURE — 93010 ELECTROCARDIOGRAM REPORT: CPT | Performed by: INTERNAL MEDICINE

## 2017-04-24 PROCEDURE — 99233 SBSQ HOSP IP/OBS HIGH 50: CPT | Performed by: HOSPITALIST

## 2017-04-24 PROCEDURE — 25000125 ZZHC RX 250: Performed by: HOSPITALIST

## 2017-04-24 PROCEDURE — A9270 NON-COVERED ITEM OR SERVICE: HCPCS | Mod: GY

## 2017-04-24 PROCEDURE — A9270 NON-COVERED ITEM OR SERVICE: HCPCS | Mod: GY | Performed by: HOSPITALIST

## 2017-04-24 PROCEDURE — 36415 COLL VENOUS BLD VENIPUNCTURE: CPT | Performed by: HOSPITALIST

## 2017-04-24 PROCEDURE — 84484 ASSAY OF TROPONIN QUANT: CPT | Performed by: HOSPITALIST

## 2017-04-24 RX ORDER — NITROGLYCERIN 0.4 MG/1
0.4 TABLET SUBLINGUAL EVERY 5 MIN PRN
Status: DISCONTINUED | OUTPATIENT
Start: 2017-04-24 | End: 2017-04-26 | Stop reason: HOSPADM

## 2017-04-24 RX ORDER — SODIUM CHLORIDE 9 MG/ML
INJECTION, SOLUTION INTRAVENOUS CONTINUOUS
Status: ACTIVE | OUTPATIENT
Start: 2017-04-24 | End: 2017-04-24

## 2017-04-24 RX ORDER — NITROGLYCERIN 0.4 MG/1
TABLET SUBLINGUAL
Status: COMPLETED
Start: 2017-04-24 | End: 2017-04-24

## 2017-04-24 RX ORDER — IOPAMIDOL 755 MG/ML
79 INJECTION, SOLUTION INTRAVASCULAR ONCE
Status: COMPLETED | OUTPATIENT
Start: 2017-04-24 | End: 2017-04-24

## 2017-04-24 RX ADMIN — NITROGLYCERIN 0.4 MG: 0.4 TABLET SUBLINGUAL at 13:11

## 2017-04-24 RX ADMIN — ACETAMINOPHEN 650 MG: 325 TABLET, FILM COATED ORAL at 19:42

## 2017-04-24 RX ADMIN — PANTOPRAZOLE SODIUM 40 MG: 40 TABLET, DELAYED RELEASE ORAL at 07:32

## 2017-04-24 RX ADMIN — LISINOPRIL 40 MG: 40 TABLET ORAL at 21:48

## 2017-04-24 RX ADMIN — LEVOTHYROXINE SODIUM 137 MCG: 137 TABLET ORAL at 21:48

## 2017-04-24 RX ADMIN — METOPROLOL SUCCINATE 100 MG: 100 TABLET, EXTENDED RELEASE ORAL at 21:48

## 2017-04-24 RX ADMIN — NITROGLYCERIN 0.4 MG: 0.4 TABLET SUBLINGUAL at 13:01

## 2017-04-24 RX ADMIN — IOPAMIDOL 79 ML: 755 INJECTION, SOLUTION INTRAVENOUS at 19:14

## 2017-04-24 RX ADMIN — INSULIN GLARGINE 40 UNITS: 100 INJECTION, SOLUTION SUBCUTANEOUS at 17:32

## 2017-04-24 RX ADMIN — ACETAMINOPHEN 650 MG: 325 TABLET, FILM COATED ORAL at 00:28

## 2017-04-24 RX ADMIN — AMLODIPINE BESYLATE 5 MG: 5 TABLET ORAL at 08:34

## 2017-04-24 RX ADMIN — SODIUM CHLORIDE 100 ML: 9 INJECTION, SOLUTION INTRAVENOUS at 19:15

## 2017-04-24 RX ADMIN — SIMVASTATIN 40 MG: 40 TABLET, FILM COATED ORAL at 21:48

## 2017-04-24 RX ADMIN — HYDRALAZINE HYDROCHLORIDE 10 MG: 20 INJECTION INTRAMUSCULAR; INTRAVENOUS at 11:51

## 2017-04-24 RX ADMIN — AMLODIPINE BESYLATE 5 MG: 5 TABLET ORAL at 21:48

## 2017-04-24 ASSESSMENT — VISUAL ACUITY
OU: DOUBLE VISION/DIPLOPIA
OU: DOUBLE VISION/DIPLOPIA

## 2017-04-24 NOTE — PLAN OF CARE
Problem: Goal Outcome Summary  Goal: Goal Outcome Summary  OT: Per plan established by the Occupational Therapist, the recommended discharge location is home with assist of wife and OP Vision therapy.  Pt amb with SEC CGA, pt had 1LOB during activity required [place] to stabilize, pt participate with scanning activity in hallway, pt was able to locate 7/10 targets missing 2 on right and 1 on Left. Educated pt with completing big scanning R<>L. Pt was able to utilize education and navigate around kitchen and locate 5/5 items requested.  Pt c/o dizziness upon return to room, /97

## 2017-04-24 NOTE — PLAN OF CARE
Problem: Goal Outcome Summary  Goal: Goal Outcome Summary        SLP - A speech-language evaluation was completed this am.  Patient presents with mild-moderate reading and mild writing deficits negatively impacted by decreased vision. Auditory comprehension and verbal language function were intact for limited testing at the moderate-complex level. Plan to provide language Tx to improve reading and writing performance in the setting of decreased vision. Plan to assess language and cognitive function further during the course of Tx. Patient does report some decreased recall. Recommend continued SLP cognitive-linguistic Tx after discharge to home with family assist.

## 2017-04-24 NOTE — PROGRESS NOTES
[facility]    Hospitalist Progress Note      Assessment & Plan   Jayro Elder is a 66 year old male who was admitted on 4/20/2017.  Past history of CAD, HTN, DM II who presents with visual aura, found to have occipita CVA with hemorrhagic transformation in addition to new diagnosis A-fib.    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  Primary suspicion is for embolic event in the setting of new diagnosis atrial fibrillation.  CT head 4/22 stable.  Serial trop negative, CRP, TSH wnl, LDL at goal.  TTE 4/22 with EF 45% with mid to distal hypokinesis, negative bubble study.  - SLP recommends reg diet, OT recommends outpatient vision therapy, SLP for cog-linguistic therapy   - goal SBP <140, hydralazine and labetalol prn (BP management as below)  - Q.4 hours neuro checks  - ok to discharge from Neuro perspective, follow up in 4 weeks     New diagnosis atrial fibrillation with controlled ventricular rate: Likely source of patient's ischemic CVA as he was compliant with Plavix and aspirin.  Likely start apixaban in 4-6 weeks pending follow up imaging with Neuro  - no anticoagulants due to hemorrhage  - rate controlled with PTA metoprolol     Type 2 diabetes with peripheral neuropathy, uncontrolled: A1C 11.7% this admission. Follows with endocrinology. Initiated on insulin approximately 2 weeks ago.  - continue PTA Lantus with SSI  - Holding PTA Januvia, metformin     Coronary artery disease, chronic systolic CHF: Patient with multiple interventions including mid LAD SUSAN in 2005, SUSAN to proximal, mid LAD and [place] to distal LAD in 2009, restenosis with stent to LAD in 2013. Primary cardiologist is Dr. Mckeon.  TTE as above.  - anticoagulation on hold, Plavix will be discontinued  - continue PTA metoprolol, simvastatin  - Cards signed off 4/22, follow up in about 4 weeks  - RRT called this  afternoon for report of chest pain - see HPI - will obtain TTE and trend troponin, also assess for PE; may need to get Cards back involved if any significant findings on echo, though options very limited (?Imdur)    Hypertension, hyperlipidemia:  - continue PTA Zocor, metoprolol  - amlodipine increased to 5 mg bid on  and lisiniopril increased to 40 mg daily    - blood pressure remains sub-optimally controlled, will hold on further titration given recent adjustments and reconsider tomorrow     Bilateral lower extremity foot pressure ulcerations: Patient with bilateral hallux bunions, distal toe necrosis from peripheral neuropathy and likely ill fitting footwear. Patient had planned for outpatient operative intervention through podiatry in the recent weeks, though this was on hold given uncontrolled diabetes.  - Outpatient podiatry follow-up as planned following recovery from subacute CVA as above  - Wound nurse consulted      Hypothyroidism:  Incidental thyroid nodules seen on admission MRI.  Thyroid US  with multinodular goiter.  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 States that his father  of a similar tumor. Unable to describe further.    DVT Prophylaxis: Pneumatic Compression Devices  Code Status: Full Code    Disposition: Expected discharge home in 1-2 days pending adequate blood pressure control.    Orlando Tate    Interval History   RRT called for chest pain.  Patient reports pain in his left shoulder radiating to bilateral jaw in addition to mild substernal pressure.  Has been having symptoms intermittently for the past few months at home, increasing in severity but not in frequency.  Not related to exertion.  Has mild pleuritic component and also reports some discomfort is reproducible with chest palpation.  Associated with diaphoresis, shortness of breath and lightheadedness  today, improved with 2 sublingual nitro.    -Data reviewed today: I reviewed all new labs and imaging results over the last 24 hours. I personally reviewed no images or EKG's today.    Physical Exam   Temp: 97.6  F (36.4  C) Temp src: Oral BP: 132/80 Pulse: 72 Heart Rate: 81 Resp: 16 SpO2: 98 % O2 Device: Nasal cannula Oxygen Delivery: 2 LPM  Vitals:    04/20/17 2141 04/21/17 0400 04/22/17 1434   Weight: 99.8 kg (220 lb) 99.6 kg (219 lb 9.3 oz) 103.9 kg (229 lb 0.9 oz)     Vital Signs with Ranges  Temp:  [97.3  F (36.3  C)-98  F (36.7  C)] 97.6  F (36.4  C)  Pulse:  [67-72] 72  Heart Rate:  [57-96] 81  Resp:  [16-18] 16  BP: (127-153)/() 132/80  SpO2:  [94 %-98 %] 98 %  I/O last 3 completed shifts:  In: 720 [P.O.:720]  Out: -     Constitutional: Well developed, well nourished male in no acute distress  Respiratory: Lungs clear bilaterally, no crackles, no wheezing or tachypnea  Cardiovascular: irregularly irregular rhythm, normal rate, normal S1/S2 without murmur, rubs or gallops, no extremity edema  GI: abdomen soft, normal bowel sounds, non-distended, non-tender  Musculoskeletal: reports mild pain with palpation of left upper chest, no rash present   Other:  alert and appropriate, cranial nerves otherwise intact, visual fields not tested    Medications     - MEDICATION INSTRUCTIONS -         lisinopril  40 mg Oral At Bedtime     amLODIPine  5 mg Oral BID     levothyroxine  137 mcg Oral At Bedtime     metoprolol  100 mg Oral At Bedtime     pantoprazole  40 mg Oral QAM AC     simvastatin  40 mg Oral At Bedtime     insulin glargine  40 Units Subcutaneous Q24H     insulin aspart  1-7 Units Subcutaneous TID AC     insulin aspart  1-5 Units Subcutaneous At Bedtime       Data     Recent Labs  Lab 04/24/17  0853 04/23/17  0747 04/22/17  0435  04/21/17  1232 04/21/17  0440 04/20/17  2200   WBC 4.5 4.7 4.0  --   --  6.0 4.9   HGB 14.0 13.5 12.1*  --   --  13.3 14.6   MCV 85 85 86  --   --  85 86    205 164  --    --  194 213    140 142  --   --  140 140   POTASSIUM 3.7 3.7 3.4  < >  --  3.3* 3.6   CHLORIDE 104 105 108  --   --  105 104   CO2 30 26 27  --   --  26 26   BUN 10 10 10  --   --  13 16   CR 0.81 0.61* 0.64*  --   --  0.72 0.90   ANIONGAP 7 9 7  --   --  9 10   BETTIE 8.8 9.0 8.0*  --   --  8.4* 8.7   * 84 112*  --   --  233* 345*   TROPI  --   --   --   --  <0.015The 99th percentile for upper reference range is 0.045 ug/L.  Troponin values in the range of 0.045 - 0.120 ug/L may be associated with risks of adverse clinical events. <0.015The 99th percentile for upper reference range is 0.045 ug/L.  Troponin values in the range of 0.045 - 0.120 ug/L may be associated with risks of adverse clinical events. <0.015The 99th percentile for upper reference range is 0.045 ug/L.  Troponin values in the range of 0.045 - 0.120 ug/L may be associated with risks of adverse clinical events.   < > = values in this interval not displayed.    No results found for this or any previous visit (from the past 24 hour(s)).

## 2017-04-24 NOTE — PLAN OF CARE
Problem: Goal Outcome Summary  Goal: Goal Outcome Summary  Outcome: No Change  Alert and oriented, start new strength of lisinopril,up with SBA,  Some necrotic ulcers on both toes, continue to monito

## 2017-04-24 NOTE — PLAN OF CARE
Problem: Goal Outcome Summary  Goal: Goal Outcome Summary  Outcome: Improving  A&O x4. Left eye blurred vision, Right sided vision field cut, baseline neuropathy in hands and mostly feet, otherwise Neuros intact. VSS. Tele A-fib, CVR with BBB. Mod CHO diet. Up with one. Wounds on bilateral feet and toes, open to air. Complained of headache, given tylenol with some relief. Plan to continue monitoring.

## 2017-04-24 NOTE — PLAN OF CARE
Problem: Goal Outcome Summary  Goal: Goal Outcome Summary  PT: Spoke with nursing, nursing advised that pt had chest pain today, and is getting a CT scan later this afternoon, nursing requested to hold PT until tomorrow.

## 2017-04-24 NOTE — PROGRESS NOTES
Neurology Progress Note    Subjective: Per pt, no changes from yesterday.  He still thinks his vision is the same.    Vitals: Temp: 97.3  F (36.3  C) Temp  Min: 97.3  F (36.3  C)  Max: 98  F (36.7  C)  Resp: 18 Resp  Min: 18  Max: 18  SpO2: 95 % SpO2  Min: 94 %  Max: 97 %  Pulse: 72 Pulse  Min: 65  Max: 72  Heart Rate: 57 Heart Rate  Min: 57  Max: 72  BP: (!) 136/92 Systolic (24hrs), Av , Min:127 , Max:142   Diastolic (24hrs), Av, Min:83, Max:93      Physical Examination:  General Exam: Awake, alert, NAD.  Neck: supple without meningismus.  CV: Regular rate and rhythm. Normal S1 and S2. No carotid bruits.  Neuro:   HCF's: Normal language, memory, and concentration. Oriented x 3, normal fund of knowledge.   CN's: On today's exam, pt claimed he could see my hand in the R superior quadrant before I got to midline.  This would represent a change from yesterday.  However, because pt had a cortical infarct, it is certainly possible he believes he sees my hand when he knows to anticipate it.  It was somewhat reproducible though.  Otherwise, pt had a complete R homonymous hemianopsia. His L VF were full to confrontation. Pupils were 3 mm, reactive with no RAPD. Fundoscopic examination limited by small pupils and pt compliance. EOMI without nystagmus. Facial sensation was normal. Face was symmetrical without weakness. Hearing was intact to finger rustle bilaterally. Tongue protruded midline, palate ml symmetrically. SCM's and traps were normal.   Motor: Normal bulk, tone, and strength throughout. MSR's 1+ and symmetrical. Toes down-going to plantar stim bilaterally.   Sensory: intact to all modalities in all extremities.   Cerebellar: F to N and H to S normal.   Gait: normal station. Gait slow, but steady.      Impression: 67 y/o man with R homonymous hemianopsia due to L PCA distribution (L occipital lobe) hemorrhage. This most likely represents an infarct with subsequent hemorrhagic transformation as pt was  reportedly in a fib at presentation. This certainly would be c/w a cardioembolic stroke. No other source of stroke identified in the work-up. Since this is likely a hemorrhagic conversion of an ischemic infarct, we cannot know how long the blood has been there, but now it has been documented to be present for 3 1/2 days. No evidence of extension of the bleeding and no clinical evidence of any worsening.  BPs better controlled overnight.     Recommendations: Continue to with hold anticoagulation for now and reassess as an outpatient 4 weeks from onset. Continue good BP control (goal of normotension). It would be reasonable to consider the next transition (home vs rehab per PT/OT) today. Ok to discharge from a neuro standpoint.  Please have pt f/u with neurology in 3-4 weeks.

## 2017-04-24 NOTE — CODE/RAPID RESPONSE
"  [facility] Physician Progress Note     1250 - Mon 24 April 2017   740  RRT -     # sharp 5/10 sharp pleuritic chest pain -   - resolved p ntg x2 sL  < 30 min     -- ?angina/ACS -     - stepwise improvement with sequential ntg sL       - 5/10 -- ntg#1 --> 3/10 -- ntg#2 --> 2/10 gradually resolving to none over next 5min only residual slight ache      -- doubt PE       --> cont observe 73   --> no d/c today   --> consider tele   --> d/w and defer further care to Dr Tate/Internal Medicine Hospitalist       RHIANNON Del Cid MD / 20 min   [facility] Physician / [phone] (p)   _____________________________________      History (RN Pt) -   chest pain -   - central substernal and bilat (R > L) clavicle into neck   - 5/10 \"sharp-heaviness\"   - sl worse with inspiration   - no change with rotation/twist torso (hand to opposite siderail bilat)   - no sob     - would take ntg sL for these sx if at home - not ordered in hosp     - initially indicated similar to MI ('05)   - subsequently clarified not like sx with MI ~ 12yrs ago    - (MI sx not chest more in back by scapulae)     - new dx afib   - hemorrhagic stroke   - hx cad/stents       Physical Exam -   Gen  - looks good - NAD - (HOB 30 )      - alert, cooperative    - nl speech / Lang    - breathing easily - nl rate/ effort/ depth     - color-good, warm, dry     VS  - 147/107 - 90 a fib     - 97% - RA - 18 - easy - nl depth   - afebrile (36.4  C / 97.6  F (o))    Neck  - supple    - JVP < 2 cm above sternal notch     Chest  - palpation not reproduce pain      - no tenderness to palpation at site of pain in central chest or by R clavicle     Lung  - clear bilat equal ausc posterolateral base     Heart  - RRR, nl s1s2, no murmur      Abd  - ND, soft, NT     -U/LE(B) - distally warm, dry, good color   -pulse(B) - +2 radial   -calf (B) - NT, no cord, no edema, +SCD         - 5/10 -- ntg#1 --> 3/10    -- ntg#2 -->      2/10    gradually resolving to none over " "next 5min only residual slight ache     - 150/   --#1-->    120/ -->140/    -- #2 --> 135/    - sl bp effect but no concerning drop       EKG  - no acute/STTW changes    - RBBB (132 qrs)    - old ant-septal MI (< 29 Jun '05)    - 91 a fib - (-2) - ( * - 132 - 402/494)               ______________________________________________                              PROBLEMS       -   - hemorrhagic CVA   - new a fib dx   -   -   -   - DM2 ( < 8% ) 7/14/2005  -   - Tobacco 11/13/2002  -   -   -   -   - [LDL/cad]   -   - hypoThyroid   -   -   -   - CAD - 6/29/05   - anterior MI -     - PTCA/stent - mid LAD  - Cardiomyopathy   -   -   -   -   -   - htn ( < 140/90 ) Essential 11/13/2002  -   -   -   - CVA   -   -   -   -   -   -   -   -   -   -   -   -   - Abdominal pain, right upper quadrant   -   -   -   -   -   -   -   -   -   - Generalized osteoarthrosis 11/13/2002  - Benign neoplasm of lower jaw bone   -   -   -   -   -   -      MEDICATIONS      -   - IBUPROFEN 400/8 prn   -   -   -   -   - sitagliptan (JANUVIA) 50   - metFORMIN (GLUCOPHAGE) 1000 bid   - insulin glargine (BASAGLAR KWIKPEN)  40   - insulin -   - glargine (LANTUS)   - aspart (NovoLOG)   -   - simvastatin (ZOCOR) 40   -   - levothyroxine 137   -   -   -   - clopidogrel (PLAVIX) 75  - aspirin 81   - metoprolol (TOPROL-XL) 100 hs   - lisinopril  20   - nitroglycerin (NITROSTAT) 0.4 MG SL   - amLODIPine (NORVASC) 5   -   -   -   - hydrALAZINE prn   - labetalol prn   -   -   -   -   -   -   -   -   - pantoprazole (PROTONIX) 40   -   -   -   -   -   -   -   -   -   -   -   -   -   -   -   -   -   -   -   -   -   -                                    APPENDICES -   - Keystrokes for convenience   - Key to symbols, abbreviations, and format conventions         Keystrokes for convenience  -   - from on-line summary article by Kj Plascencia-- + \"all-around computer geek\"    - Working with words instead of letters   - Moving the Cursor   - Selecting text   - " Combine in sequence   - Editing   - Formatting   - Functions           Working with words instead of letters -     - using L or R arrow  (<- / ->), Backspace (<--), or Delete keys to select single characters     - add Control key (option key for [employer] users) to apply to entire words or phrases          - C/<-   - Ctrl + L arrow  - move cursor to beginning of previous word   - C/->   - Ctrl + R arrow - move cursor to beginning of next word     - C/<--  - Ctrl + Backspace  - delete previous word   - C/delete  - Ctrl + Delete  - delete next word     - C/up   - Ctrl + up arrow  - select text from cursor to beginning current paragraph or text entry field   - C/dn   - Ctrl + dn arrow  - select text from cursor to end current paragraph or text entry field                 Moving the Cursor -     - Home   -   - beginning of current line   - End   -   - end of current line   - C/Home  - Ctrl + Home  - top of text entry field   - C/End   - Ctrl + End  - bottom of text entry field   - pg up   - Page Up  - up a frame   - pg dn   - Page Down  - down a frame                 Selecting text -      - ^/end   - shift + end   - select to end of current line   - ^/home  - shift + home  - select to beginning of current line     - C/Lclk  - ctrl* + L click   - select individual smartphrases/entries/lines from  list   - ^/Lclk   - shift + L click   - select all smartphrases/entries/lines from initial selection to next     - ^<-   - shift + L arrow   - select characters one at a time   - ^->   - shift + R arrow   - select characters one at a time     - ^up    - shift  + up arrow   - select line above   - ^dn   - shift + dn arrow   - select line below     - ^C<-  - shift + ctrl + L arrow  - select words   - ^C->   - shift + ctrl + R arrow  - select words     - ^up  - shift + up arrow   - select paragraph above cursor   - ^dn   - shift + dn arrow   - select paragraph below  cursor      - ^Home  - shift + Home   - select text between  "cursor and beginning of line   - ^End   - shift + End   - select text between cursor and end of line     - ^C/Home - shift + ctrl + Home   - select text between cursor and beginning of next text entry field -------  - ^C/End   - shift + ctrl + End   - select text between cursor and end of next text entry field     - ^pg dn   - shift + page down   - select frame of text below cursor   - ^pg up   - shift + page up   - select frame of text above cursor     - C/A  - Ctrl + A    - select all text               Combine in sequence -     - ^End      - select to end line   - ^dn     - select line below   - (not need \"delete\" first, just start typing)               Editing -     - C/C   - Ctrl + C*   - copy selected text   - C/Insert  - Ctrl + Insert   - copy selected text     - C/X  -Ctrl + X*    - cut/delete selected text   - ^Del  - shift + Delete   - cut/delete selected text     - C/V   - Ctrl + V*   - paste   - ^Insert   - shift + Insert   - paste     - C/Z   - Ctrl + Z*   - undo   - C/Y   - Ctrl + Y*   - redo     - C/Z   - Ctrl + Z*   - go back 1 step   - A/<--  - Alt + Backspace   - go back 1 step (same as Ctrl + Z)                Formatting -     - C/B  - Ctrl + B*   - Bold   - C/I  - Ctrl + I*    - Italics   - C/U  - Ctrl .+ U*  - Underline               Functions -     - C/F   - Ctrl + F    - Find   - F3   - F3    - Find Next   - ^F3  - shift + F3   - Find Previous     - C/O  - Ctrl + O   - Open   - C/S   - Ctrl + S   - Save   - C/N   - Ctrl + N   - New doc   - C/P   - Ctrl + P   - Print     - A   - Alt**    - activate application's menu bar   - A/F   - Alt + F    - open File menu   - A/E  - Alt + E    - open Edit menu   - A/V  - Alt + V    - open View menu                                     ______________________________            Key to symbols, abbreviations, and format conventions - [ordered by sense / meaning]     Symbol (Description)  Meaning / interpretation     #  (number/ hashtag)   - problem - summary " which does not need editing if copied forward     -  (dash)   - data - s/sx (changes) found/observed at time pt seen     --  (double dash/ hyphen)  - Assessment/ Interpretation/ Impression - of observations in context of problem -       - - improved, worsened no change   -->  (arrow)    - Plan - action to be done in response to assessment -       - - either that day either or after discharge       --->>  (double arrow)   - cont same plan (eg --->> lisinopril  =  Cont lisinopril )        ([place])   - degrees    [   ] (brackets)  - [author's (my own) comments/thoughts/additions/assumptions]      0'4 (apostrophe)   - instead of period - not stop highlighting entire line as a single unit - means 0.4           Abbrv'n    Abbreviation    - Time -   -    - date-time (month implied)  - (-2d)  - 2d ago   - d  - day(s)   - m - min(s)   - minute(s)   - h  - hr(s)   - hour(s)   - mo  - month(s)       - c  - with   - w /   - with     - s  - without   - w /o  - without     - x  - except     - p  - after   - a  - before     - c/w  - consistent with   - c/c  - compare/contrast - ie differentiate from       - sx  - symptomatic   - asx  - asymptomatic       - sx  - symptoms   - s/sx  - signs/symptoms       - dx  - diagnosis   - dz  - disease   - dz  - dizzy   - ez  - easy       - tx  - treatment (or transplant)   - Tx  - Transplant (eg. LRD RTx - Living Related Donor Renal Transplant)     - px  - prophylaxis   - pphx  - prophylaxis       - d/c  - discharge (eg from hospital or from eye)       - cx  - culture (or canceled)   - cx  - cancelled       - fx  - fracture   - rxn  - reaction   - fxn  - function       - yest  - yesterday - (or yday)   - theodore  - tomorrow       - nl  - normal   - abnl  - abnormal     - incr'd  -   - decr'd -     - hr (r)  - hyper-   - ho (o) - hypo-      - abx  - antibiotics       - cp  - chest pain   - sob  - shortness of breath       - dz/ lh - dizziness / lightheadedness   - HA - HeadAche (ha)   - H/N   - Head / Neck (h/n)       - f/c  - fevers/chills   - n/v  - nausea/vomiting       - cp  - chest pain/ pressure   - sob  - shortness of breath       - eg  - for example (e.g.)   - ie  - in other words (i.e.)       - 2   - secondary to (caused by, because of)   - 2/2  - secondary to (caused by, because of)     - dtr  - daughter   - mo  - mother   - fa  - father     - *o/w  - otherwise   - usu - usual     - * note: capital L used for visual clarity/emphasis where otherwise lower case is strictly correct -   - - eg in generic names drugs Labetalol or mL milliLiters        - [Cap]* - Capital Letter  - not need capital - only capital for clarity/emphasis in presentation here   - *Ctrl  - Control   - **Alt - Alternate         - BMBx  - Bone Marrow Biopsy             Anatomic Relationships -       - Lat - lateral (Lateral)        - prox  - proximal       Laterality (side) -   - bLat  - biLateral  - both sides   - iLat  - ipsiLateral  - same side   - cLat  - contraLateral  - opposite side       - L*  - Left*   - R  - Right   - B  - Bilateral   - (katlyn)* - (any anatomical structure) + * (asterix) => Bilateral implied unless side / laterality specified    -- eg. Calf* => calf (B)    -- eg. Calf -  R .... L .... => different findings for R or L specified         - * note: capital L used for visual clarity/emphasis where otherwise lower case is strictly correct -   - - eg in generic names drugs Labetalol or mL milliLiters       - eg -     - UE  - Upper Extremity   - LE - Lower Extremity     - LUE   - Left Upper Extremity   - LLE  - Right Lower Extremity     - RUE  - Right Upper Extremity   - RLE  - Right Lower Extremity     - BU/LE  - Bilat Upper/Lower Extremities       __________________________________________

## 2017-04-25 ENCOUNTER — APPOINTMENT (OUTPATIENT)
Dept: CARDIOLOGY | Facility: CLINIC | Age: 67
DRG: 065 | End: 2017-04-25
Attending: HOSPITALIST
Payer: COMMERCIAL

## 2017-04-25 ENCOUNTER — APPOINTMENT (OUTPATIENT)
Dept: OCCUPATIONAL THERAPY | Facility: CLINIC | Age: 67
DRG: 065 | End: 2017-04-25
Payer: COMMERCIAL

## 2017-04-25 ENCOUNTER — APPOINTMENT (OUTPATIENT)
Dept: SPEECH THERAPY | Facility: CLINIC | Age: 67
DRG: 065 | End: 2017-04-25
Payer: COMMERCIAL

## 2017-04-25 ENCOUNTER — APPOINTMENT (OUTPATIENT)
Dept: PHYSICAL THERAPY | Facility: CLINIC | Age: 67
DRG: 065 | End: 2017-04-25
Payer: COMMERCIAL

## 2017-04-25 LAB
ANION GAP SERPL CALCULATED.3IONS-SCNC: 6 MMOL/L (ref 3–14)
BUN SERPL-MCNC: 12 MG/DL (ref 7–30)
CALCIUM SERPL-MCNC: 8.6 MG/DL (ref 8.5–10.1)
CHLORIDE SERPL-SCNC: 105 MMOL/L (ref 94–109)
CO2 SERPL-SCNC: 28 MMOL/L (ref 20–32)
CREAT SERPL-MCNC: 0.76 MG/DL (ref 0.66–1.25)
ERYTHROCYTE [DISTWIDTH] IN BLOOD BY AUTOMATED COUNT: 13 % (ref 10–15)
GFR SERPL CREATININE-BSD FRML MDRD: NORMAL ML/MIN/1.7M2
GLUCOSE BLDC GLUCOMTR-MCNC: 151 MG/DL (ref 70–99)
GLUCOSE BLDC GLUCOMTR-MCNC: 163 MG/DL (ref 70–99)
GLUCOSE BLDC GLUCOMTR-MCNC: 172 MG/DL (ref 70–99)
GLUCOSE BLDC GLUCOMTR-MCNC: 90 MG/DL (ref 70–99)
GLUCOSE SERPL-MCNC: 91 MG/DL (ref 70–99)
HCT VFR BLD AUTO: 38.9 % (ref 40–53)
HGB BLD-MCNC: 13.3 G/DL (ref 13.3–17.7)
MCH RBC QN AUTO: 29 PG (ref 26.5–33)
MCHC RBC AUTO-ENTMCNC: 34.2 G/DL (ref 31.5–36.5)
MCV RBC AUTO: 85 FL (ref 78–100)
PLATELET # BLD AUTO: 219 10E9/L (ref 150–450)
POTASSIUM SERPL-SCNC: 3.8 MMOL/L (ref 3.4–5.3)
RBC # BLD AUTO: 4.58 10E12/L (ref 4.4–5.9)
SODIUM SERPL-SCNC: 139 MMOL/L (ref 133–144)
WBC # BLD AUTO: 4.1 10E9/L (ref 4–11)

## 2017-04-25 PROCEDURE — 25000131 ZZH RX MED GY IP 250 OP 636 PS 637: Mod: GY | Performed by: HOSPITALIST

## 2017-04-25 PROCEDURE — 93321 DOPPLER ECHO F-UP/LMTD STD: CPT | Mod: 26 | Performed by: INTERNAL MEDICINE

## 2017-04-25 PROCEDURE — A9270 NON-COVERED ITEM OR SERVICE: HCPCS | Mod: GY | Performed by: HOSPITALIST

## 2017-04-25 PROCEDURE — 25000132 ZZH RX MED GY IP 250 OP 250 PS 637: Mod: GY | Performed by: HOSPITALIST

## 2017-04-25 PROCEDURE — 25500064 ZZH RX 255 OP 636: Performed by: INTERNAL MEDICINE

## 2017-04-25 PROCEDURE — 12000000 ZZH R&B MED SURG/OB

## 2017-04-25 PROCEDURE — 36415 COLL VENOUS BLD VENIPUNCTURE: CPT | Performed by: HOSPITALIST

## 2017-04-25 PROCEDURE — 40000264 ECHO LIMITED WITH LUMASON

## 2017-04-25 PROCEDURE — 00000146 ZZHCL STATISTIC GLUCOSE BY METER IP

## 2017-04-25 PROCEDURE — 99232 SBSQ HOSP IP/OBS MODERATE 35: CPT | Performed by: PSYCHIATRY & NEUROLOGY

## 2017-04-25 PROCEDURE — 40000225 ZZH STATISTIC SLP WARD VISIT: Performed by: SPEECH-LANGUAGE PATHOLOGIST

## 2017-04-25 PROCEDURE — 93308 TTE F-UP OR LMTD: CPT | Mod: 26 | Performed by: INTERNAL MEDICINE

## 2017-04-25 PROCEDURE — 93308 TTE F-UP OR LMTD: CPT

## 2017-04-25 PROCEDURE — 97532 ZZHC SP COGNITIVE SKILLS EA 15 MIN: CPT | Mod: GN | Performed by: SPEECH-LANGUAGE PATHOLOGIST

## 2017-04-25 PROCEDURE — 40000193 ZZH STATISTIC PT WARD VISIT: Performed by: PHYSICAL THERAPIST

## 2017-04-25 PROCEDURE — 97112 NEUROMUSCULAR REEDUCATION: CPT | Mod: GP | Performed by: PHYSICAL THERAPIST

## 2017-04-25 PROCEDURE — 40000133 ZZH STATISTIC OT WARD VISIT

## 2017-04-25 PROCEDURE — 40000447 ZZH STATISTIC PT NICU VISIT: Performed by: PHYSICAL THERAPIST

## 2017-04-25 PROCEDURE — 97535 SELF CARE MNGMENT TRAINING: CPT | Mod: GO

## 2017-04-25 PROCEDURE — 85027 COMPLETE CBC AUTOMATED: CPT | Performed by: HOSPITALIST

## 2017-04-25 PROCEDURE — 99233 SBSQ HOSP IP/OBS HIGH 50: CPT | Performed by: HOSPITALIST

## 2017-04-25 PROCEDURE — 97116 GAIT TRAINING THERAPY: CPT | Mod: GP | Performed by: PHYSICAL THERAPIST

## 2017-04-25 PROCEDURE — 93325 DOPPLER ECHO COLOR FLOW MAPG: CPT | Mod: 26 | Performed by: INTERNAL MEDICINE

## 2017-04-25 PROCEDURE — 80048 BASIC METABOLIC PNL TOTAL CA: CPT | Performed by: HOSPITALIST

## 2017-04-25 RX ORDER — LANOLIN ALCOHOL/MO/W.PET/CERES
3 CREAM (GRAM) TOPICAL
Status: DISCONTINUED | OUTPATIENT
Start: 2017-04-25 | End: 2017-04-26 | Stop reason: HOSPADM

## 2017-04-25 RX ADMIN — LISINOPRIL 40 MG: 40 TABLET ORAL at 22:03

## 2017-04-25 RX ADMIN — SULFUR HEXAFLUORIDE 5 ML: KIT at 13:29

## 2017-04-25 RX ADMIN — INSULIN ASPART 1 UNITS: 100 INJECTION, SOLUTION INTRAVENOUS; SUBCUTANEOUS at 11:55

## 2017-04-25 RX ADMIN — AMLODIPINE BESYLATE 5 MG: 5 TABLET ORAL at 08:35

## 2017-04-25 RX ADMIN — PANTOPRAZOLE SODIUM 40 MG: 40 TABLET, DELAYED RELEASE ORAL at 06:48

## 2017-04-25 RX ADMIN — AMLODIPINE BESYLATE 5 MG: 5 TABLET ORAL at 22:03

## 2017-04-25 RX ADMIN — METOPROLOL SUCCINATE 100 MG: 100 TABLET, EXTENDED RELEASE ORAL at 22:03

## 2017-04-25 RX ADMIN — SIMVASTATIN 40 MG: 40 TABLET, FILM COATED ORAL at 22:03

## 2017-04-25 RX ADMIN — INSULIN ASPART 1 UNITS: 100 INJECTION, SOLUTION INTRAVENOUS; SUBCUTANEOUS at 17:52

## 2017-04-25 RX ADMIN — ACETAMINOPHEN 650 MG: 325 TABLET, FILM COATED ORAL at 11:54

## 2017-04-25 RX ADMIN — Medication 15 MG: at 11:54

## 2017-04-25 RX ADMIN — LEVOTHYROXINE SODIUM 137 MCG: 137 TABLET ORAL at 22:03

## 2017-04-25 RX ADMIN — INSULIN GLARGINE 40 UNITS: 100 INJECTION, SOLUTION SUBCUTANEOUS at 16:17

## 2017-04-25 ASSESSMENT — VISUAL ACUITY
OU: BLURRED VISION;DOUBLE VISION/DIPLOPIA
OU: BLURRED VISION

## 2017-04-25 NOTE — PLAN OF CARE
Problem: Goal Outcome Summary  Goal: Goal Outcome Summary  A&O but forgetful. Neuros R field cut, blurry vision. BP above 140 but controlled with scheduled PO IMDUR. Tele A fib with CVR and BBB. Consistent carb diet. Up with SBA. C/o HA, reported relief with tylenol. Plan to continue to monitor, waiting echo results.

## 2017-04-25 NOTE — PLAN OF CARE
Problem: Goal Outcome Summary  Goal: Goal Outcome Summary  Outcome: Improving  A&Ox4. Blurred/double vision, R-vision field cut, baseline numbness/tingling in BLE from neuropathy. VSS ex irregular HR. Tele Afib with CVR. Diabetic/consistent carb diet. Up with Up with SBA. Denies pain. Discharge plan pending, continue to monitor.

## 2017-04-25 NOTE — PROVIDER NOTIFICATION
"Dr. Benítez paged re: echo results. \"results from echo: ejection fraction now 35-40%, hypokinesis noticed anteroseptally. Please see results and advise.\"    Adden:  Dr. Benítez called and stated that there wasn't a change in the echo. Plan to d/c tomorrow, monitoring IMDUR effects on BP. Nursing will continue to monitor.  "

## 2017-04-25 NOTE — PLAN OF CARE
Problem: Goal Outcome Summary  Goal: Goal Outcome Summary  Outcome: No Change  A&O. VSS. Complains of headache and pressure on chest when taking deep breaths or pushing on it. He states that this pain is better than it was earlier. Pain improved with tylenol. Up SBA. Troponin's negative. Tele Afib Cvr with BBB. Neuro's intact except for right hand weakness and bilateral lower extremity numbness and tingling.  Also has a right field cut. Denies blurry vision.

## 2017-04-25 NOTE — PLAN OF CARE
Problem: Goal Outcome Summary  Goal: Goal Outcome Summary  PT: Pt supine upon PT arrival, agreeable to participate in PT.  Pt transferred sup<>sit at SBA, transferred sit<>stand x 2 at CGA, cues required for safety, pt ambulated 100 ft with SPC at CGA, with occasional path deviations. Pt completed standing balance exercises at CGA/Min A required for losses of balance.  Due to decreased strength, and activity tolerance, and impaired balance, PT recommendation: home with use of SEC, increased assist from spouse, and OPPT.

## 2017-04-25 NOTE — PLAN OF CARE
Problem: Goal Outcome Summary  Goal: Goal Outcome Summary  Outcome: Improving  A&O x4. VSS on room air, BP within parameters. Up with SBA. Neuros intact except right field cut and double vision, baseline BLE numbness. Pt reported pain in upper chest radiating to bilateral jaw and chest pressure, RRT called - see MD note. Patient had CT scan this evening. Plan for ECHO tomorrow. Blood glucose 72 and 117 - no coverage needed. Tele monitored afib with SVR. Discharge plan pending.

## 2017-04-25 NOTE — PROGRESS NOTES
Neurology Progress Note    Subjective: Pt reported that he had some chest discomfort yesterday that radiated into his neck.  He still feels some discomfort, but it is significantly improved.    Vitals: Temp: 98.3  F (36.8  C) Temp  Min: 97.6  F (36.4  C)  Max: 98.3  F (36.8  C)  Resp: 16 Resp  Min: 16  Max: 18  SpO2: 96 % SpO2  Min: 95 %  Max: 98 %  Pulse: 60 Pulse  Min: 60  Max: 60  Heart Rate: 59 Heart Rate  Min: 59  Max: 96  BP: 128/84 Systolic (24hrs), Av , Min:124 , Max:153   Diastolic (24hrs), Av, Min:77, Max:107      Physical Examination:  General Exam: Awake, alert, NAD.  Neck: supple without meningismus.  CV: Regular rate and rhythm. Normal S1 and S2. No carotid bruits.  Neuro:  HCF's: Normal language, memory, and concentration. Oriented x 3, normal fund of knowledge.  CN's: Like yesterday, pt reported that he could see my hand in the R superior quadrant about 20 degrees before I got to midline. Otherwise, pt had a complete R homonymous hemianopsia. His L VF were full to confrontation. Pupils were 3 mm, reactive with no RAPD. Fundoscopic examination limited by small pupils and pt compliance. EOMI without nystagmus. Facial sensation was normal. Face was symmetrical without weakness. Hearing was intact to finger rustle bilaterally. Tongue protruded midline, palate ml symmetrically. SCM's and traps were normal.  Motor: Normal bulk, tone, and strength throughout. MSR's 1+ and symmetrical. Toes down-going to plantar stim bilaterally.  Sensory: intact to all modalities in all extremities.  Cerebellar: F to N and H to S normal.  Gait: normal station. Gait slow, but steady.      Impression: 65 y/o man with R homonymous hemianopsia due to L PCA distribution (L occipital lobe) hemorrhage. This most likely represents an infarct with subsequent hemorrhagic transformation as pt was reportedly in a fib at presentation. This certainly would be c/w a cardioembolic stroke. No other source of stroke identified in  the work-up. BPs continue to be well-controlled.      Recommendations: Continue to with hold anticoagulation for now and reassess as an outpatient 4 weeks from onset. Continue good BP control (goal of normotension). Still ok to discharge from a neuro standpoint, but we need to address the pain pt had.  From a neuro standpoint, we would prefer that pt avoid antiplatelets and anticoagulation for 4 weeks. However, pt was on ASA and Plavix for cardiac purposes.  Certainly, if there is a strong indication for either or both antiplatelets, then it would be reasonable to consider restarting one or both of those sooner rather than later.  If there appears to be a strong indication for antiplatelets, then we will need to discuss the risks and benefits of antiplatelet therapy with pt.  Obviously the risk of restarting would be continued bleeding in the brain.  I discussed this with Dr. Benítez and we agreed that we will discuss this issue with pt if there appears to be a strong indication for antiplatelets.

## 2017-04-25 NOTE — PLAN OF CARE
Problem: Goal Outcome Summary  Goal: Goal Outcome Summary        SLP - Pt seen for cognitive linguistic Tx this am. Pt alert and cooperative. Pt completed short term word-related memory tasks. Pt's short term memory for words improves if pt associates a semantic category or gesture with each presented words. Pt completed attention and symbol cancellation tasks with 100% accuracy. Pt independently employed scanning strategies to ensure he scanned all symbols. Pt provided education regarding memory strategies. Pt verbalized understanding. Plan to continue cognitive linguistic tasks, writing tasks, and reading tasks. Recommend continued cognitive linguistic Tx. Discharge to home with continued OP SLP cognitive linguistic Tx.

## 2017-04-25 NOTE — PROVIDER NOTIFICATION
"Dr. Benítez paged re: \"Dr. Noe from neurology is questioning if ASA or plavix should be restarted due to chest pain and stent history. Please advise.\" Nursing will continue to monitor.  "

## 2017-04-25 NOTE — PROGRESS NOTES
[facility]    Hospitalist Progress Note    Date of Service (when I saw the patient): 04/25/2017    Assessment & Plan   Mr. Elder is a markedly pleasant 66 year old gentleman with CAD, Type 2 Diabetes mellitus, hypertension, dyslipidemia, chronic bilateral foot ulcers and hypothyroid who was admitted 4/21/2017 for vision changes and found to have subacute left occipital stroke causing hemorrhagic transformation and new atrial fibrillation. He is now having chest pain.    Subacute left occipital CVA, likely cardioembolic, with hemorrhagic transformation: Confirmed on admission MRI; MRA was negative for stenosis or obstruction. Our primary suspicion is for an embolic event in the setting of his new diagnosis of atrial fibrillation. His aspirin and Plavix were held at admission. Serial Troponins were negative, CRP and TSH normal, and LDL at goal. TTE on 4/22 revealed EF 45% with mid to distal hypokinesis, and negative bubble study. CT Head on 4/22 showed no progression of bleeding. His vision changes persist.  - SLP recommends regular diet. OT recommends outpatient vision therapy and SLP for cognitive-linguistic therapy   - For his anticoagulation strategy going forward see below  - I have discussed his case today with Dr. Noe of Neurology      New diagnosis atrial fibrillation with controlled ventricular rate: Likely this is the source of Mr. Elder's stroke as above. Cardiology was consulted.  - There is an imperative to hold anticoagulation if at all possible for at least 4 weeks. He should follow up with Neurology at that point to consider starting medication such as Apixaban to prevent further strokes in the future  - His rate is controlled with PTA Metoprolol    Chest Pain: Mr. Elder has known CAD and chronic systolic CHF. He has had multiple past interventions including mid LAD SUSAN in 2005, SUSAN to proximal, mid LAD and [place] to distal LAD in 2009, restenosis with stent to LAD in 2013.  "His primary cardiologist is Dr. Mckeon. He has had several episodes of pain in the hospital, without elevated Troponin or significant change in EKG tests, which show afib with RBBB.  - Given that his last stent was in 2013 it seems in-stent thrombosis off blood thinners for the next 4 weeks is unlikely.  - We plan to repeat TTE today to make sure his ongoing chest pain is not causing impending ACS or worsening heart failure.  - He continues Amlodipine and Lisinopril, both increased here  - We will start Imdur today at a low dose for prevention of pain  - We continue PTA metoprolol, simvastatin      Type 2 diabetes with peripheral neuropathy, uncontrolled: A1C 11.7% this admission. He follows with Endocrinology and was initiated on insulin approximately 2 weeks ago.  - Continue PTA Lantus with SSI  - Holding PTA Januvia, metformin for now; likely to resume at discharge        Bilateral lower extremity foot pressure ulcerations: He suffers from bilateral hallux bunions, distal toe necrosis from peripheral neuropathy and likely ill fitting footwear. He had planned for outpatient operative intervention through Podiatry in the recent weeks, though this was on hold given his uncontrolled diabetes.  - Outpatient podiatry follow-up will be as planned, following recovery from subacute CVA as above  - Wound nurse consulted       Hypothyroidism: Incidental thyroid nodules seen on admission MRI. Thyroid US 4/20 showed a multinodular goiter.  - He continues PTA Synthroid and should follow up with Endocrinology in the next couple of weeks after discharge    DVT Prophylaxis: Pneumatic Compression Devices  Code Status: Full Code    Disposition: Expected discharge perhaps as soon as tomorrow if chest pain resolves and repeat TTE looks okay    Froilan Benítez MD    Interval History   Feeling better at present; still has some substernal chest \"twinges\" that radiate to the jaw, without dyspnea, nausea, palpitations or " sweating.    -Data reviewed today: I reviewed all new labs and imaging results over the last 24 hours. I personally reviewed the EKG tracing showing afib with RBBB, unchanged.    Physical Exam   Temp: 98.3  F (36.8  C) Temp src: Oral BP: 128/84 Pulse: 60 Heart Rate: 59 Resp: 16 SpO2: 96 % O2 Device: None (Room air) Oxygen Delivery: 2 LPM  Vitals:    04/20/17 2141 04/21/17 0400 04/22/17 1434   Weight: 99.8 kg (220 lb) 99.6 kg (219 lb 9.3 oz) 103.9 kg (229 lb 0.9 oz)     Vital Signs with Ranges  Temp:  [97.6  F (36.4  C)-98.3  F (36.8  C)] 98.3  F (36.8  C)  Pulse:  [60] 60  Heart Rate:  [59-96] 59  Resp:  [16-18] 16  BP: (124-153)/() 128/84  SpO2:  [95 %-98 %] 96 %  I/O last 3 completed shifts:  In: 360 [P.O.:360]  Out: -     Constitutional: Alert and oriented to person, place and time; no apparent distress  HEENT: normocephalic moist mucus membranes  Respiratory: lungs clear to auscultation bilaterally  Cardiovascular: Irregular S1 S2 no murmurs rubs or gallops  GI: abdomen soft non tender non distended bowel sounds positive  Skin: no rash, good turgor  Musculoskeletal: no clubbing, cyanosis or edema  Neuro: EOMI; moves all four extremities  Psych: Appropriate affect, insight and judgment      Medications     - MEDICATION INSTRUCTIONS -         isosorbide mononitrate  15 mg Oral Daily     lisinopril  40 mg Oral At Bedtime     amLODIPine  5 mg Oral BID     levothyroxine  137 mcg Oral At Bedtime     metoprolol  100 mg Oral At Bedtime     pantoprazole  40 mg Oral QAM AC     simvastatin  40 mg Oral At Bedtime     insulin glargine  40 Units Subcutaneous Q24H     insulin aspart  1-7 Units Subcutaneous TID AC     insulin aspart  1-5 Units Subcutaneous At Bedtime       Data     Recent Labs  Lab 04/25/17  0753 04/24/17  2144 04/24/17  1740 04/24/17  1420 04/24/17  0853 04/23/17  0747   WBC 4.1  --   --   --  4.5 4.7   HGB 13.3  --   --   --  14.0 13.5   MCV 85  --   --   --  85 85     --   --   --  217 205   NA  139  --   --   --  141 140   POTASSIUM 3.8  --   --   --  3.7 3.7   CHLORIDE 105  --   --   --  104 105   CO2 28  --   --   --  30 26   BUN 12  --   --   --  10 10   CR 0.76  --   --   --  0.81 0.61*   ANIONGAP 6  --   --   --  7 9   BETTIE 8.6  --   --   --  8.8 9.0   GLC 91  --   --   --  120* 84   TROPI  --  <0.015The 99th percentile for upper reference range is 0.045 ug/L.  Troponin values in the range of 0.045 - 0.120 ug/L may be associated with risks of adverse clinical events. <0.015The 99th percentile for upper reference range is 0.045 ug/L.  Troponin values in the range of 0.045 - 0.120 ug/L may be associated with risks of adverse clinical events. <0.015The 99th percentile for upper reference range is 0.045 ug/L.  Troponin values in the range of 0.045 - 0.120 ug/L may be associated with risks of adverse clinical events.  --   --        Recent Results (from the past 24 hour(s))   CT Chest Pulmonary Embolism w Contrast    Narrative    CT CHEST WITH CONTRAST   4/24/2017 7:23 PM     HISTORY: Evaluate for pulmonary embolus.    COMPARISON: None.    TECHNIQUE: Following the uneventful administration of 79 mL Isovue 370  intravenous contrast, helical sections were acquired through the lungs  according to the pulmonary embolism protocol. Coronal reconstructions  were generated. Radiation dose for this scan was reduced using  automated exposure control, adjustment of the mA and/or kV according  to the patient's size, or iterative reconstruction technique.    FINDINGS: No visualized pulmonary embolus. The thoracic aorta is  normal in caliber. Atherosclerotic calcification in the thoracic aorta  and coronary arteries.    Minimal interstitial thickening in bilateral lung bases. 0.4 cm  subpleural nodule in the anteromedial aspect of the left upper lobe  (series 4 image 56). The lungs are otherwise clear. Very small right  pleural effusion. No left pleural or pericardial effusion. No enlarged  lymph nodes in the  chest.    Scan through the upper abdomen is unremarkable.      Impression    IMPRESSION:   1. Minimal interstitial thickening in bilateral lung bases, equivocal  for interstitial pulmonary edema. There is also a very small right  pleural effusion.  2. No visualized pulmonary embolus.  3. Tiny indeterminate left lung nodule. This is likely a benign  nodule. Recommend comparison with prior imaging studies, if available.  If not available and the patient is a smoker or has other significant  risk factors, a followup CT scan could be considered in 12 months to  confirm stability.    LLOYD SONI MD

## 2017-04-26 ENCOUNTER — APPOINTMENT (OUTPATIENT)
Dept: SPEECH THERAPY | Facility: CLINIC | Age: 67
DRG: 065 | End: 2017-04-26
Payer: COMMERCIAL

## 2017-04-26 ENCOUNTER — APPOINTMENT (OUTPATIENT)
Dept: OCCUPATIONAL THERAPY | Facility: CLINIC | Age: 67
DRG: 065 | End: 2017-04-26
Payer: COMMERCIAL

## 2017-04-26 ENCOUNTER — APPOINTMENT (OUTPATIENT)
Dept: PHYSICAL THERAPY | Facility: CLINIC | Age: 67
DRG: 065 | End: 2017-04-26
Payer: COMMERCIAL

## 2017-04-26 VITALS
HEIGHT: 72 IN | RESPIRATION RATE: 16 BRPM | HEART RATE: 60 BPM | WEIGHT: 229.06 LBS | SYSTOLIC BLOOD PRESSURE: 139 MMHG | TEMPERATURE: 97.8 F | DIASTOLIC BLOOD PRESSURE: 87 MMHG | BODY MASS INDEX: 31.03 KG/M2 | OXYGEN SATURATION: 97 %

## 2017-04-26 LAB
ANION GAP SERPL CALCULATED.3IONS-SCNC: 8 MMOL/L (ref 3–14)
BUN SERPL-MCNC: 15 MG/DL (ref 7–30)
CALCIUM SERPL-MCNC: 8.6 MG/DL (ref 8.5–10.1)
CHLORIDE SERPL-SCNC: 106 MMOL/L (ref 94–109)
CO2 SERPL-SCNC: 26 MMOL/L (ref 20–32)
CREAT SERPL-MCNC: 0.77 MG/DL (ref 0.66–1.25)
ERYTHROCYTE [DISTWIDTH] IN BLOOD BY AUTOMATED COUNT: 13 % (ref 10–15)
GFR SERPL CREATININE-BSD FRML MDRD: NORMAL ML/MIN/1.7M2
GLUCOSE BLDC GLUCOMTR-MCNC: 140 MG/DL (ref 70–99)
GLUCOSE BLDC GLUCOMTR-MCNC: 73 MG/DL (ref 70–99)
GLUCOSE BLDC GLUCOMTR-MCNC: 75 MG/DL (ref 70–99)
GLUCOSE SERPL-MCNC: 76 MG/DL (ref 70–99)
HCT VFR BLD AUTO: 39.1 % (ref 40–53)
HGB BLD-MCNC: 13.1 G/DL (ref 13.3–17.7)
INTERPRETATION ECG - MUSE: NORMAL
MCH RBC QN AUTO: 28.5 PG (ref 26.5–33)
MCHC RBC AUTO-ENTMCNC: 33.5 G/DL (ref 31.5–36.5)
MCV RBC AUTO: 85 FL (ref 78–100)
PLATELET # BLD AUTO: 222 10E9/L (ref 150–450)
POTASSIUM SERPL-SCNC: 3.8 MMOL/L (ref 3.4–5.3)
RBC # BLD AUTO: 4.6 10E12/L (ref 4.4–5.9)
SODIUM SERPL-SCNC: 140 MMOL/L (ref 133–144)
WBC # BLD AUTO: 4.6 10E9/L (ref 4–11)

## 2017-04-26 PROCEDURE — 40000193 ZZH STATISTIC PT WARD VISIT: Performed by: PHYSICAL THERAPIST

## 2017-04-26 PROCEDURE — 85027 COMPLETE CBC AUTOMATED: CPT | Performed by: HOSPITALIST

## 2017-04-26 PROCEDURE — 97530 THERAPEUTIC ACTIVITIES: CPT | Mod: GO | Performed by: OCCUPATIONAL THERAPY ASSISTANT

## 2017-04-26 PROCEDURE — 97116 GAIT TRAINING THERAPY: CPT | Mod: GP | Performed by: PHYSICAL THERAPIST

## 2017-04-26 PROCEDURE — 40000225 ZZH STATISTIC SLP WARD VISIT: Performed by: SPEECH-LANGUAGE PATHOLOGIST

## 2017-04-26 PROCEDURE — 40000133 ZZH STATISTIC OT WARD VISIT: Performed by: OCCUPATIONAL THERAPY ASSISTANT

## 2017-04-26 PROCEDURE — 92507 TX SP LANG VOICE COMM INDIV: CPT | Mod: GN | Performed by: SPEECH-LANGUAGE PATHOLOGIST

## 2017-04-26 PROCEDURE — 25000132 ZZH RX MED GY IP 250 OP 250 PS 637: Mod: GY | Performed by: HOSPITALIST

## 2017-04-26 PROCEDURE — 99239 HOSP IP/OBS DSCHRG MGMT >30: CPT | Performed by: HOSPITALIST

## 2017-04-26 PROCEDURE — 36415 COLL VENOUS BLD VENIPUNCTURE: CPT | Performed by: HOSPITALIST

## 2017-04-26 PROCEDURE — 00000146 ZZHCL STATISTIC GLUCOSE BY METER IP

## 2017-04-26 PROCEDURE — 97530 THERAPEUTIC ACTIVITIES: CPT | Mod: GP | Performed by: PHYSICAL THERAPIST

## 2017-04-26 PROCEDURE — 99232 SBSQ HOSP IP/OBS MODERATE 35: CPT | Performed by: PSYCHIATRY & NEUROLOGY

## 2017-04-26 PROCEDURE — A9270 NON-COVERED ITEM OR SERVICE: HCPCS | Mod: GY | Performed by: HOSPITALIST

## 2017-04-26 PROCEDURE — 97535 SELF CARE MNGMENT TRAINING: CPT | Mod: GO | Performed by: OCCUPATIONAL THERAPY ASSISTANT

## 2017-04-26 PROCEDURE — 80048 BASIC METABOLIC PNL TOTAL CA: CPT | Performed by: HOSPITALIST

## 2017-04-26 RX ORDER — ISOSORBIDE MONONITRATE 30 MG/1
15 TABLET, EXTENDED RELEASE ORAL DAILY
Qty: 15 TABLET | Refills: 0 | Status: SHIPPED | OUTPATIENT
Start: 2017-04-26 | End: 2017-05-02

## 2017-04-26 RX ORDER — LISINOPRIL 20 MG/1
40 TABLET ORAL AT BEDTIME
Qty: 30 TABLET | Refills: 0 | Status: SHIPPED | OUTPATIENT
Start: 2017-04-26 | End: 2017-05-02

## 2017-04-26 RX ORDER — AMLODIPINE BESYLATE 5 MG/1
5 TABLET ORAL 2 TIMES DAILY
Qty: 90 TABLET | Refills: 3 | Status: SHIPPED | OUTPATIENT
Start: 2017-04-26 | End: 2017-05-02

## 2017-04-26 RX ORDER — INSULIN GLARGINE 100 [IU]/ML
20 INJECTION, SOLUTION SUBCUTANEOUS EVERY 24 HOURS
Qty: 6 ML | Refills: 0 | Status: SHIPPED | OUTPATIENT
Start: 2017-04-26 | End: 2017-06-15

## 2017-04-26 RX ADMIN — PANTOPRAZOLE SODIUM 40 MG: 40 TABLET, DELAYED RELEASE ORAL at 06:33

## 2017-04-26 RX ADMIN — INSULIN ASPART 1 UNITS: 100 INJECTION, SOLUTION INTRAVENOUS; SUBCUTANEOUS at 13:37

## 2017-04-26 RX ADMIN — AMLODIPINE BESYLATE 5 MG: 5 TABLET ORAL at 08:48

## 2017-04-26 RX ADMIN — Medication 15 MG: at 08:48

## 2017-04-26 ASSESSMENT — VISUAL ACUITY
OU: BLURRED VISION

## 2017-04-26 NOTE — DISCHARGE INSTRUCTIONS
Follow-up with the [facility] in 1 month with Dr. Berry. Call [phone] to make an appointment.   Address: [address] [place] [address]. [address], [place] [address]    Follow-up with Cardiology in 1 month to discuss anticoagulation for atrial fibrillation.     Your risk factors for stroke or TIA (transient ischemic attack):    Your Risk Factors Your Results Normal Ranges   High blood pressure  Continue to take BP medications as prescribed. Avoid stressful situations if at possible. BP Readings from Last 1 Encounters:   04/25/17 132/85    Less than 120/80   Cholesterol              Total  Continue to take your simvastatin as prescribed. Practice eating a healthy, low-saturated fat diet. Lab Results   Component Value Date    CHOL 118 04/21/2017      Less than 150    Triglycerides   Lab Results   Component Value Date    TRIG 59 04/21/2017    Less than 150   LDL Lab Results   Component Value Date    LDL 55 04/21/2017       Less than 70   HDL Lab Results   Component Value Date    HDL 51 04/21/2017            Greater than 40 (men)  Greater than 50 (women)   Diabetes  Monitor your blood sugar and take Insulin as need. Practice eating a healthy low-carb diet   Recent Labs  Lab 04/25/17  0753   GLC 91    Fasting blood glucose    Smoking/tobacco use  Quit smoking and tobacco   Overweight  Work with your PCP to establish a healthy diet  Lose 1-2 pounds a week   Lack of exercise  Work with your PCP to establish a healthy exercise program  30 minutes moderate activity each day   Other risk factors include carotid (neck) artery disease, atrial fibrillation and stress. You may be on new medicine to treat high blood pressure, cholesterol, diabetes or atrial fibrillation.    Understanding Stroke Booklet given to patient. Please refer to booklet for further information.    Stroke warning signs and symptoms - CALL 911 right away for:  - Sudden numbness or weakness in the face, arm or leg (often on one side of the  body).  - Sudden confusion or trouble understanding what is going on.  - Sudden blurred or decreased vision in one or both eyes.  - Sudden trouble speaking, loss of balance, dizziness or problems with coordination.  - Sudden, severe headache for no reason.  - Fainting or seizures.  - Symptoms may go away then come back suddenly.

## 2017-04-26 NOTE — PLAN OF CARE
Problem: Goal Outcome Summary  Goal: Goal Outcome Summary  PT:  Pt ambulated 400 ft with SPC at CGA, with minor path deviations.  Pt transferred sit<>stand x 3 at SBA, and ascended and descended 3 stairs x 3 CGA with use of handrail and SPC.  PT recommendation: Home with assist from spouse and outpatient PT, with use of SPC for functional mobility

## 2017-04-26 NOTE — PLAN OF CARE
Problem: Goal Outcome Summary  Goal: Goal Outcome Summary  Physical Therapy Discharge Summary     Reason for therapy discharge:    Discharged to home with outpatient therapy.     Progress towards therapy goal(s). See goals on Care Plan in [facility] electronic health record for goal details.  Goals not met.  Barriers to achieving goals:   discharge from facility.     Therapy recommendation(s):    Continued therapy is recommended.  Rationale/Recommendations:  pt will benefit from outpatient PT.

## 2017-04-26 NOTE — PROGRESS NOTES
Neurology Progress Note    Subjective: No new issues.  Pt reports no further chest pain. Still having vision loss - no changes.    Vitals: Temp: 97.7  F (36.5  C) Temp  Min: 97.6  F (36.4  C)  Max: 98.2  F (36.8  C)  Resp: 16 Resp  Min: 16  Max: 16  SpO2: 97 % SpO2  Min: 94 %  Max: 97 %    No Data Recorded  Heart Rate: 64 Heart Rate  Min: 52  Max: 81  BP: 131/85 Systolic (24hrs), Av , Min:117 , Max:143   Diastolic (24hrs), Av, Min:76, Max:90    Physical Examination:  General Exam: Awake, alert, NAD.  Neck: supple without meningismus.  CV: Regular rate and rhythm. Normal S1 and S2. No carotid bruits.  Neuro:  HCF's: Normal language, memory, and concentration. Oriented x 3, normal fund of knowledge.  CN's: Like yesterday, pt reported that he could see my hand in the R superior quadrant about 20 degrees before I got to midline. Otherwise, pt had a complete R homonymous hemianopsia. His L VF were full to confrontation. Pupils were 3 mm, reactive with no RAPD. Fundoscopic examination limited by small pupils and pt compliance. EOMI without nystagmus. Facial sensation was normal. Face was symmetrical without weakness. Hearing was intact to finger rustle bilaterally. Tongue protruded midline, palate ml symmetrically. SCM's and traps were normal.  Motor: Normal bulk, tone, and strength throughout. MSR's 1+ and symmetrical. Toes down-going to plantar stim bilaterally.  Sensory: intact to all modalities in all extremities.  Cerebellar: F to N and H to S normal.  Gait: normal station. Normal casual gait - improved from the last couple of days.  It appears pt is accommodating well for his vision loss.      Impression: 65 y/o man with R homonymous hemianopsia due to L PCA distribution (L occipital lobe) hemorrhage. This most likely represents an infarct with subsequent hemorrhagic transformation as pt was reportedly in a fib at presentation. This certainly would be c/w a cardioembolic stroke. No other source of stroke  identified in the work-up. BPs continue to be well-controlled.      Recommendations: Continue to with hold anticoagulation for now and reassess as an outpatient 4 weeks from onset. Continue good BP control (goal of normotension). Still ok to discharge from a neuro standpoint.  It appears there is no strong indication for antiplatelets at this point, so would avoid them until f/u with neurology as outpt.

## 2017-04-26 NOTE — PLAN OF CARE
Problem: Goal Outcome Summary  Goal: Goal Outcome Summary  SLP - Attempted cognitive linguistic Tx in am. Pt not in room. Will attempt again later.

## 2017-04-26 NOTE — PLAN OF CARE
Problem: Goal Outcome Summary  Goal: Goal Outcome Summary  Outcome: Improving  A&O. Neuros R field cut and blurry vision. VSS. Tele afib with CVR and BBB. Mod carb diet, SSI given at dinner and bedtime. Up with SBA. Walked halls with nursing x1. Denies pain. Pt had no complaints of chest pain. Plan d/c home tmrw with OP SLP for cog/linguistics and OP OT for vision therapy.

## 2017-04-26 NOTE — DISCHARGE SUMMARY
[facility]    Discharge Summary  Hospitalist    Date of Admission:  4/20/2017  Date of Discharge:  4/26/2017  Discharging Provider: Froilan Benítez MD  Date of Service (when I saw the patient): 04/26/17    Discharge Diagnoses   Subacute left occipital stroke, likely cardioembolic, with hemorrhagic transformation    New diagnosis atrial fibrillation with controlled ventricular rate    Chest pain, perhaps due to atrial fibrillation    Type 2 diabetes with peripheral neuropathy, uncontrolled    Multinodular goiter    History of Present Illness   Mr. Elder is a markedly pleasant 66 year old gentleman with CAD, Type 2 Diabetes mellitus, hypertension, dyslipidemia, chronic bilateral foot ulcers and hypothyroid who was admitted 4/21/2017 for vision changes and found to have subacute left occipital stroke causing hemorrhagic transformation and new atrial fibrillation.      Hospital Course   Jayro Elder was admitted on 4/20/2017.  The following problems were addressed during his hospitalization:    Subacute left occipital CVA, likely cardioembolic, with hemorrhagic transformation: Confirmed on admission MRI; MRA was negative for stenosis or obstruction. Neurology was consulted and I have spoken with Dr. Noe of Neurology again at discharge today. Our primary suspicion is for an embolic event in the setting of his new diagnosis of atrial fibrillation. His aspirin and Plavix were held at admission. Serial Troponins were negative, CRP and TSH normal, and LDL at goal. TTE on 4/22 revealed EF 45% with mid to distal hypokinesis, and negative bubble study. CT Head on 4/22 showed no progression of bleeding. His vision changes persist.  - Speech pathology consulted and recommends regular diet. OT consulted and recommends outpatient vision therapy and further speech therapy for cognitive-linguistic therapy   - His aspirin and Plavix continue to be held at discharge. - He will follow up with Neurology in  3 weeks with repeat brain imaging testing at that time      New diagnosis atrial fibrillation with controlled ventricular rate: Likely this is the source of Mr. Elder's stroke as above. Cardiology was consulted.  - There is an imperative to hold anticoagulation if at all possible for at least 4 weeks. He will therefore follow up with Neurology at that point, as above, to consider starting medication such as Apixaban to prevent further strokes in the future  - His rate is controlled with Metoprolol, continued at discharge  - He should follow up with his Cardiologist Dr. Mckeon in 4-6 weeks     Chest Pain: Mr. Elder has known CAD and chronic systolic CHF. He has had multiple past interventions including mid LAD SUSAN in 2005, SUSAN to proximal, mid LAD and Wister to distal LAD in 2009, restenosis with stent to LAD in 2013. His primary cardiologist is Dr. Mckeon. He has had several episodes of chest pain in the hospital, without elevated Troponin or significant change in EKG tests, which show afib with RBBB. Nor have there been changes in TTE tests (as above on 4/22, repeated 4/25 with no changes). Thus his pain seems less likely to be due to ACS. It could be related to his new afib.  - Given that his last stent was in 2013 it seems in-stent thrombosis off blood thinners for the next 4 weeks is unlikely.  - He continues Amlodipine and Lisinopril, both increased here  - We started Imdur on 4/25 and his pain is now resolved, and blood pressure and heart rate well controlled  - We continue Metoprolol and Simvastatin as well  - It seems overall that his adherence to these medications is low and may need continued encouragement in future      Type 2 diabetes with peripheral neuropathy, uncontrolled: A1C 11.7% this admission. He follows with Endocrinology and was initiated on insulin approximately 2 weeks ago. However, he adds that he has not been taking it.  - Continue Lantus; dose decreased for hypoglycemia seen in  hospital to 20 units every 24 hours; especially as he plans to resume Januvia and Metformin at discharge  - He should follow up with his PCP in a week for further evaluation      Bilateral lower extremity foot pressure ulcerations: He suffers from bilateral hallux bunions, distal toe necrosis from peripheral neuropathy and likely ill fitting footwear. He had planned for outpatient operative intervention through Podiatry in the recent weeks, though this was on hold given his uncontrolled diabetes.  - Outpatient Podiatry follow-up will now need to be delayed, pending recovery from hemorrhagic stroke as above  - Wound nurse was consulted while he was here      Hypothyroidism: Incidental thyroid nodules eere seen on admission MRI. Thyroid US on 4/20 showed a multinodular goiter.  - He continues Synthroid and should follow up with Endocrinology in the next couple of weeks after discharge.     Froilan Benítez MD    Code Status   Full Code       Primary Care Physician   Physician No Ref-Primary    Blood pressure 125/85, pulse 60, temperature 98.2  F (36.8  C), temperature source Oral, resp. rate 16, height 1.829 m (6'), weight 103.9 kg (229 lb 0.9 oz), SpO2 97 %.    Constitutional: Alert and oriented to person, place and time; no apparent distress  HEENT: normocephalic moist mucous membranes  Respiratory: lungs clear to auscultation bilaterally  Cardiovascular: regular S1 S2 this morning; no murmurs rubs or gallops  GI: abdomen soft non tender non distended bowel sounds positive  Lymph/Hematologic: no pallor, no cervical lymphadenopathy  Skin: no rash, good turgor  Musculoskeletal: no clubbing, cyanosis or edema  Neurologic: extra-ocular muscles intact; moves all four extremities  Psychiatric: appropriate affect, insight and judgment    Discharge Disposition   Discharged to home  Condition at discharge: Good    Consultations This Hospital Stay   NEUROLOGY IP CONSULT  PHYSICAL THERAPY ADULT IP CONSULT  OCCUPATIONAL  THERAPY ADULT IP CONSULT  SPEECH LANGUAGE PATH ADULT IP CONSULT  SWALLOW EVAL SPEECH PATH AT BEDSIDE IP CONSULT  SMOKING CESSATION PROGRAM IP CONSULT  NEUROSURGERY IP CONSULT  PHARMACY IP CONSULT  WOUND OSTOMY CONTINENCE NURSE  IP CONSULT  CARDIOLOGY IP CONSULT  PHARMACY LIAISON FOR MEDICATION COVERAGE CONSULT    Time Spent on this Encounter   I, Froilan Benítez, personally saw the patient today and spent greater than 30 minutes discharging this patient.    Discharge Orders     Physical Therapy Referral     Occupational Therapy Referral     Speech Therapy Referral     Reason for your hospital stay   For treatment of stroke and new diagnosis of atrial fibrillation.     Follow-up and recommended labs and tests    Follow up with primary care provider, Physician No Ref-Primary, within 7 days for hospital follow- up.  The following labs/tests are recommended: Blood Glucose.     Activity   Your activity upon discharge: activity as tolerated     Full Code     Diet   Follow this diet upon discharge: Orders Placed This Encounter     Moderate Consistent CHO Diet       Discharge Medications   Current Discharge Medication List      START taking these medications    Details   isosorbide mononitrate (IMDUR) 30 MG 24 hr tablet Take 0.5 tablets (15 mg) by mouth daily  Qty: 15 tablet, Refills: 0    Associated Diagnoses: Hypertension goal BP (blood pressure) < 140/90      order for DME Equipment being ordered: Cane ()  Treatment Diagnosis: Impaired balance  Qty: 1 each, Refills: 0    Associated Diagnoses: Hemorrhagic stroke (H)         CONTINUE these medications which have CHANGED    Details   insulin glargine (BASAGLAR KWIKPEN) 100 UNIT/ML injection Inject 20 Units Subcutaneous every 24 hours  Qty: 6 mL, Refills: 0    Associated Diagnoses: Type 2 diabetes mellitus with other kidney complication (H)      lisinopril (PRINIVIL/ZESTRIL) 20 MG tablet Take 2 tablets (40 mg) by mouth At Bedtime  Qty: 30 tablet, Refills: 0     Associated Diagnoses: Cardiomyopathy (H); Coronary artery disease involving native coronary artery of native heart without angina pectoris; Essential hypertension with goal blood pressure less than 140/90      amLODIPine (NORVASC) 5 MG tablet Take 1 tablet (5 mg) by mouth 2 times daily  Qty: 90 tablet, Refills: 3    Associated Diagnoses: Coronary artery disease involving native coronary artery of native heart with angina pectoris (H)         CONTINUE these medications which have NOT CHANGED    Details   simvastatin (ZOCOR) 40 MG tablet Take 1 tablet (40 mg) by mouth At Bedtime  Qty: 90 tablet, Refills: 3    Associated Diagnoses: Coronary artery disease involving native coronary artery of native heart without angina pectoris      metoprolol (TOPROL-XL) 100 MG 24 hr tablet Take 1 tablet (100 mg) by mouth At Bedtime  Qty: 90 tablet, Refills: 3    Associated Diagnoses: Coronary artery disease involving native coronary artery of native heart without angina pectoris      sitagliptan (JANUVIA) 50 MG tablet Take 50 mg by mouth daily      pantoprazole (PROTONIX) 40 MG enteric coated tablet Take 1 tablet (40 mg) by mouth every morning (before breakfast)  Qty: 30 tablet, Refills: 0    Associated Diagnoses: GERD (gastroesophageal reflux disease)      metFORMIN (GLUCOPHAGE) 1000 MG tablet Take 1 tablet (1,000 mg) by mouth 2 times daily (with meals)  Qty: 60 tablet, Refills: 0    Comments: Restart this medication on 11/16 AM  Associated Diagnoses: Type II or unspecified type diabetes mellitus without mention of complication, not stated as uncontrolled      levothyroxine (SYNTHROID, LEVOTHROID) 137 MCG tablet Take 137 mcg by mouth At Bedtime   Qty: 90 tablet, Refills: 3      ONE TOUCH ULTRA TEST VI STRP use qid  Qty: 120, Refills: 11    Associated Diagnoses: Type II or unspecified type diabetes mellitus without mention of complication, not stated as uncontrolled      ONE TOUCH LANCETS MISC use qid  Qty: 120, Refills: 4       nitroglycerin (NITROSTAT) 0.4 MG SL tablet Place 1 tablet (0.4 mg) under the tongue every 5 minutes as needed for chest pain  Qty: 60 tablet, Refills: 0    Associated Diagnoses: Chest pain         STOP taking these medications       IBUPROFEN PO Comments:   Reason for Stopping:         clopidogrel (PLAVIX) 75 MG tablet Comments:   Reason for Stopping:         aspirin 81 MG EC tablet Comments:   Reason for Stopping:             Allergies   Allergies   Allergen Reactions     No Known Allergies      Data   Most Recent 3 CBC's:  Recent Labs   Lab Test  04/26/17   0745  04/25/17   0753  04/24/17   0853   WBC  4.6  4.1  4.5   HGB  13.1*  13.3  14.0   MCV  85  85  85   PLT  222  219  217      Most Recent 3 BMP's:  Recent Labs   Lab Test  04/26/17   0745  04/25/17   0753  04/24/17   0853   NA  140  139  141   POTASSIUM  3.8  3.8  3.7   CHLORIDE  106  105  104   CO2  26  28  30   BUN  15  12  10   CR  0.77  0.76  0.81   ANIONGAP  8  6  7   BETTIE  8.6  8.6  8.8   GLC  76  91  120*     Most Recent 2 LFT's:  Recent Labs   Lab Test 03/04/14 11/30/11   0852  11/02/10   0900   AST   --    --   23   ALT  50*  30  33   ALKPHOS   --    --   72   BILITOTAL   --    --   0.3     Most Recent INR's and Anticoagulation Dosing History:  Anticoagulation Dose History     Recent Dosing and Labs Latest Ref Rng & Units 6/29/2005 7/25/2005 2/4/2009 2/11/2009 11/3/2010    INR 0.86 - 1.14 1.02 1.06 0.96 0.91 1.03        Most Recent 3 Troponin's:  Recent Labs   Lab Test  04/24/17   2144  04/24/17   1740  04/24/17   1420   TROPI  <0.015  The 99th percentile for upper reference range is 0.045 ug/L.  Troponin values in   the range of 0.045 - 0.120 ug/L may be associated with risks of adverse   clinical events.    <0.015  The 99th percentile for upper reference range is 0.045 ug/L.  Troponin values in   the range of 0.045 - 0.120 ug/L may be associated with risks of adverse   clinical events.    <0.015  The 99th percentile for upper reference range is  0.045 ug/L.  Troponin values in   the range of 0.045 - 0.120 ug/L may be associated with risks of adverse   clinical events.       Most Recent Cholesterol Panel:  Recent Labs   Lab Test  04/21/17   0440   CHOL  118   LDL  55   HDL  51   TRIG  59     Most Recent 6 Bacteria Isolates From Any Culture (See EPIC Reports for Culture Details):No lab results found.  Most Recent TSH, T4 and A1c Labs:  Recent Labs   Lab Test  04/22/17   0435  04/21/17   0440   TSH   --   3.28   A1C  11.7*   --      Results for orders placed or performed during the hospital encounter of 04/20/17   MR Head w/o Contrast Angiogram    Narrative    MRA ANGIOGRAM HEAD W/O CONTRAST   4/21/2017 12:34 AM     HISTORY:  check for occlusion    TECHNIQUE:  3D time-of-flight MR angiogram of the head without  contrast.    COMPARISON: None.    FINDINGS: The visualized portions of the distal internal carotid and  vertebral arteries, the basilar artery, [place], and the  proximal anterior, middle and posterior cerebral arteries all appear  normal. There is no evidence of aneurysm or vascular stenosis or  occlusion.      Impression    IMPRESSION:  Negative, no occluded intracranial vessels. The left  posterior cerebral artery appears patent.    I agree with the preliminary report that was communicated to the  referring physician.    REINA PAEZ MD   MR Neck w/o & w Contrast Angiogram    Narrative    MRA ANGIOGRAM NECK W/O & W CONTRAST 4/21/2017 12:32 AM     HISTORY:  Evaluate for dissection, vertebrobasilar flow, carotid  stenosis    TECHNIQUE:  Sequential axial images of the neck were obtained using  2-dimensional time-of-flight before contrast and 3-dimensional  time-of-flight after the uneventful administration of 10 mL Gadavist  iv contrast.    COMPARISON:  None.    FINDINGS: Stenosis is relative to the distal internal carotid  diameter.    Right Carotid:  No significant stenosis is seen at the bifurcation  relative to the distal internal carotid  diameter.    Left Carotid:  No significant stenosis is seen at the bifurcation  relative to the distal internal carotid diameter.    Vertebrals: Antegrade flow is seen in both vertebral arteries.    No arterial dissection is identified.    There appear to be multiple thyroid nodules. A nodule in the left lobe  measures up to 3 cm in size.      Impression    IMPRESSION:   1. No stenosis is seen at either carotid bifurcation. No arterial  dissection.  2. Multiple thyroid nodules. Ultrasound would be helpful to further  characterize these nodules.    I agree with the preliminary report that was communicated to the  referring physician.    REINA PAEZ MD   MR Brain w/o & w Contrast    Narrative    MR BRAIN W/O & W CONTRAST  4/21/2017 12:33 AM     HISTORY:  check for stroke or bleed    TECHNIQUE: Multiplanar, multisequence MRI of the brain without and  with 10 mL Gadavist IV contrast material.    COMPARISON: None.    FINDINGS:  Diffusion-weighted images reveal an area of restricted  diffusion in the left occipital region. This measures about 5.5 cm in  AP dimension by 3 cm in transverse dimension. On gradient-echo images,  there is susceptibility artifact throughout this infarct consistent  with hemorrhagic transformation. No mass effect. There is also a  small, 4 mm area of restricted diffusion in the white matter of the  left parietal lobe consistent with a small white matter infarct. There  is enlargement of the lateral ventricles and cortical sulci consistent  with atrophy. T2 hyperintensities are seen in the white matter  compatible small vessel ischemic change in this age patient.. There  are no gadolinium enhancing lesions.  The facial structures appear  normal.  The arteries at the base of the brain and the dural venous  sinuses appear patent.      Impression    IMPRESSION:   1. Left occipital infarct with hemorrhagic transformation. No mass  effect.  2. Small White matter infarct in the white matter of the left  parietal  lobe.  3. Atrophy. White matter hyperintensities compatible small vessel  ischemic change.    I agree with the preliminary report that was communicated to the  referring physician.     REINA PAEZ MD   US Thyroid    Narrative    THYROID ULTRASOUND   4/21/2017 12:28 PM     HISTORY: assess multiple thyroid nodules    COMPARISON: MRI of the neck dated 4/21/2017.    FINDINGS:      Right lobe: 4.8 x 2.6 x 2.1 cm.    Left lobe: 5.5 x 2.5 x 2.5 cm.    The background thyroid parenchyma is heterogeneous.    Thyroid nodules are identified as follows: The thyroid gland is  diffusely heterogeneous making it difficult to identify discrete  nodules.      Impression    IMPRESSION: Multinodular goiter.    PATRICE OLSON MD   CT Head w/o Contrast    Narrative    CT SCAN OF THE HEAD WITHOUT CONTRAST   4/22/2017 11:22 AM     HISTORY: ICH.    TECHNIQUE:  Axial images of the head and coronal reformations without  IV contrast material.  Radiation dose for this scan was reduced using  automated exposure control, adjustment of the mA and/or kV according  to patient size, or iterative reconstruction technique.    COMPARISON: MR dated 4/21/2017.    FINDINGS: Hemorrhagic left occipital infarct noted and unchanged when  compared to the MR study. There is some mild localized mass effect,  but no evidence for midline shift. Mild cerebral atrophy is present.  Patchy white matter changes are seen in both hemispheres. The left  occipital hemorrhagic infarct involves a small portion of the inferior  left parietal lobe as well. No new infarcts are present. There is no  evidence for any new hemorrhage or skull fracture. Visualized sinuses  and mastoid air cells are clear.      Impression    IMPRESSION: Stable left occipital hemorrhagic infarct with mild mass  effect, but no shift.    JOSLYN HAN MD   CT Chest Pulmonary Embolism w Contrast    Narrative    CT CHEST WITH CONTRAST   4/24/2017 7:23 PM     HISTORY: Evaluate for pulmonary  embolus.    COMPARISON: None.    TECHNIQUE: Following the uneventful administration of 79 mL Isovue 370  intravenous contrast, helical sections were acquired through the lungs  according to the pulmonary embolism protocol. Coronal reconstructions  were generated. Radiation dose for this scan was reduced using  automated exposure control, adjustment of the mA and/or kV according  to the patient's size, or iterative reconstruction technique.    FINDINGS: No visualized pulmonary embolus. The thoracic aorta is  normal in caliber. Atherosclerotic calcification in the thoracic aorta  and coronary arteries.    Minimal interstitial thickening in bilateral lung bases. 0.4 cm  subpleural nodule in the anteromedial aspect of the left upper lobe  (series 4 image 56). The lungs are otherwise clear. Very small right  pleural effusion. No left pleural or pericardial effusion. No enlarged  lymph nodes in the chest.    Scan through the upper abdomen is unremarkable.      Impression    IMPRESSION:   1. Minimal interstitial thickening in bilateral lung bases, equivocal  for interstitial pulmonary edema. There is also a very small right  pleural effusion.  2. No visualized pulmonary embolus.  3. Tiny indeterminate left lung nodule. This is likely a benign  nodule. Recommend comparison with prior imaging studies, if available.  If not available and the patient is a smoker or has other significant  risk factors, a followup CT scan could be considered in 12 months to  confirm stability.    LLOYD SONI MD

## 2017-04-26 NOTE — PLAN OF CARE
Problem: Goal Outcome Summary  Goal: Goal Outcome Summary        SLP - Pt seen for cognitive linguistic Tx in am. Pt alert and upright in chair. Pt completed paragraph reading comprehension tasks, numerical reasoning task, and basic-mod level reasoning task. Pt reported difficulty with reading comprehension were due to his new bifocals not being made correctly. Educated pt regarding possible need for new glasses due to CVA and outpatient SLP Tx recommendation. Pt verbalized understanding. Plan to discontinue SLP Tx due to discharge home. Recommend discontinue SLP cognitive linguistic Tx and discharge to home with continued outpatient SLP cognitive linguistic Tx.       Speech Language Therapy Discharge Summary    Reason for therapy discharge:    Discharged to home with outpatient therapy.    Progress towards therapy goal(s). See goals on Care Plan in [facility] electronic health record for goal details.  Goals partially met.  Barriers to achieving goals:   discharge from facility.    Therapy recommendation(s):    Continued therapy is recommended.  Rationale/Recommendations:  Cognitive linguistic Tx to increase reading comprehension and expressive writing abilities in daily functional communication..

## 2017-04-26 NOTE — PLAN OF CARE
Problem: Goal Outcome Summary  Goal: Goal Outcome Summary  Outcome: Improving  A&Ox4. Neuros intact except for R field cut, blurry vision. Has numbness/tingling in BLE at baseline. VSS, HR irregular. Tele Afib CVR with BBB. Mod carb/diabetic diet. Up with SBA. Denies pain and headache. Plan is to d/c to home with OP SLP and OT.

## 2017-04-26 NOTE — PROGRESS NOTES
"Care Transition Initial Assessment - RN    Reason For Consult: discharge planning, transportation   Met with: Patient and talked with wife by phone.    DATA   Active Problems:    CVA (cerebral vascular accident) (H)     New atrial fibrillation  Cognitive Status: awake, alert and oriented.  Primary Care Clinic Name:  [place]  Primary Care MD Name: clementine JAVIER  Contact information and PCP information verified: Yes, pt requested to change his PCP at [facility] to Dr. Lomeli    Lives With: spouse  Living Arrangements: house     Description of Support System: Supportive, Involved   Who is your support system?: Wife, Children   Support Assessment: Inadequate interpersonal communication skills, Other (see comments) (freq problems w/ medical staff & blames them for his issues)     Insurance concerns: Pt felt like Lantus controlled his DM better but quit taking it when his insurance copay was too high.  He started taking another kind of insulin but quit taking that altogether because he \"didn't feel it did any good\".  Hospitalist aware of insurance concerns.  Pt wants rx for lantus again and rx written.    Pt will need NOAC in a month.  Per pharmacy liaison he is eligible for Eliquis or Xarelto 's discount.   ASSESSMENT AND INTERVENTIONS  Patient currently receives the following services:  none        Identified issues/concerns regarding health management: Uncontrolled DM, new stroke requiring OP PT/OT/SLP, new AF but unable to start anticoagulant for a month until his brain hemorrhagic conversation resolves.  He has a fair amount of distrust of the medical field and has been to a couple of different clinics with multiple PCPs.    He has foot ulcers but didn't want to quit wearing his compression stockings when a PCP told him to stop because not wearing them makes his ankles swell.  Explained that compression can compromise circulation and that MD probably didn't want him to wear them so that they " "would heal better.  States \"She never told me that.\"  States one PCP sent him home with percocet when he was having a heart attack.  States another PCP \"banged on his desk so hard things fell off it and he yelled at me\".  Discussed importance of having a PCP he can trust and he would like to try Dr Lomeli at [facility].  She doesn't have immediate openings for hospital f/u but he will see Davion JAVIER on 4/28 in the meantime.  He also needs f/u with cardiology in 6 weeks so appt made with [facility].  He needs to see neurology to see if he is ok to start NOAC in a month and appt made for him with Gloria Counters for that.    Pt needs PT/OT/SLP.  His wife works full time during the day.  Discussed if other family or friends could take him to appts.  He states no.  Discussed taxis, Uber rides, etc.  Pt is not excited about this. Pt has [employer] application and felt he could use that but explained that this would take at least a few weeks to get approval.  Talked with his wife by phone.  She works 8:30a to 6p.  She states they don't have family or friends that could help as children work.  Discussed possibility of home care but pt isn't and doesn't want to be homebound.  He is afraid to drive d/t his field cut and he is not cleared to drive but wife states that he would be \"unbearable\" if he couldn't go out to eat, etc.  Discussed options of scheduling therapies together and if family could take time off a little early or go in late and use Uber at other times until [employer] kicks in.  She thinks they can work something out.  Explained the importance of early therapy for strokes and pt understands this. Consulted with  for other transportation options but none available.    PLAN  Financial costs for the patient include copays for medications.  His copay for Lantus is $110 per pharmacy rep.  He wants his rx filled at .  Gave him  card for free 30d supply.  He understands that this will " buy him some time but that he will need to address this with PCP if he wants to continue it.  He is eligible for $10/month Eliquis  discount when he start anticoagulation.  His wife takes this medication and it is what he would like.  He will discuss with neurology at his appt.  Patient given options and choices for discharge: yes  Patient/family is agreeable to the plan?  Yes.  Patient anticipates discharging to home w/ OP therapy .        Patient anticipates needs for home equipment: Yes, cane DME ordered    Plan/Disposition: Home   Appointments: PCP, neuro, cardiology all set up.    Wife/pt to schedule therapy appts as they will need to work with wife/son work schedules.

## 2017-04-26 NOTE — PHARMACY
Eliquis #60 copay = $120.  We can offer patient a free trial voucher for the first fill. This pt is also eligible for the  copay assistance card for refills (available to patients with commercial insurance).    Pradaxa #60 copay = $114.    Savaysa is non-formulary and would require a prior authorization.    Xarelto #30 copay = $75.  We can offer patient a free trial voucher for the first fill. This pt is also eligible for the  copay assistance card for refills (available to patients with commercial insurance).    Lantus - $110  We can offer patient a free trial voucher for the first fill. This pt is also eligible for the  copay assistance card for refills (available to patients with commercial insurance).    Thank you,  [facility] Discharge Pharmacy Liaison  [phone]

## 2017-04-26 NOTE — PLAN OF CARE
Problem: Goal Outcome Summary  Goal: Goal Outcome Summary  Outcome: Adequate for Discharge Date Met:  04/26/17  Patient discharge instruction reviewed to patient and spouse and they verbalized understanding. Patient also received his prescription for cane. Wife transporting patient home.

## 2017-04-26 NOTE — PLAN OF CARE
Problem: Goal Outcome Summary  Goal: Goal Outcome Summary  OT: Per plan established by the Occupational Therapist, the recommended discharge location is home with assistance from wife as needed and OP vision therapy.  Pt completed toileting transfer with clothing management and at sink ADLS independent.  Amb without AD SBA.

## 2017-04-26 NOTE — PLAN OF CARE
Problem: Goal Outcome Summary  Goal: Goal Outcome Summary  A&O. R field cut. VSS. Tele Afib w/ CVR and BBB. Mod carb diet. Up with SBA. C/o mild HA, denies any intervention. Plan to d/c today at 1600, wife to .

## 2017-04-27 ENCOUNTER — CARE COORDINATION (OUTPATIENT)
Dept: CARE COORDINATION | Facility: CLINIC | Age: 67
End: 2017-04-27

## 2017-04-27 ENCOUNTER — TELEPHONE (OUTPATIENT)
Dept: FAMILY MEDICINE | Facility: CLINIC | Age: 67
End: 2017-04-27

## 2017-04-27 ENCOUNTER — CARE COORDINATION (OUTPATIENT)
Dept: CARDIOLOGY | Facility: CLINIC | Age: 67
End: 2017-04-27

## 2017-04-27 NOTE — TELEPHONE ENCOUNTER
Please contact patient for In-patient follow up.  [phone] (home)     Visit date: 4/26/17  Diagnosis listed:Hemorrhagic Stroke (H), Cardiomyopathy (H)  Number of visits in past 12 months:0/1

## 2017-04-27 NOTE — PROGRESS NOTES
"Clinic Care Coordination Contact  Care Team Conversations    CCRN received CTS Hand-off on this patient.     He was hospitalized 04/20-04/26/2017 for Hemorrhagic Stroke.      Per CTS:  ANDRIA Score:  Average but should not be elevated or high.  Patient Concerns:  Insurance copays, feels frustrated by multiple PCPs, can't drive d/t vision field cut.      Fragility, no support or lacking a support system, multiple providers/specialties, non-adherence to plan of care related to: other- quit taking insulin in recent past, doesn't follow MD directions.   Patient was given the following referrals while IP:   MTM, PT/OT/Speech Therapy     Key Recommendations:    Identified issues/concerns regarding health management: Uncontrolled DM, new stroke requiring OP PT/OT/SLP, new AF but unable to start anticoagulant for a month until his brain hemorrhagic conversation resolves. He has a fair amount of distrust of the medical field and has been to a couple of different clinics with multiple PCPs.   He has foot ulcers but didn't want to quit wearing his compression stockings when a PCP told him to stop because not wearing them makes his ankles swell. Explained that compression can compromise circulation and that MD probably didn't want him to wear them so that they would heal better. States \"She never told me that.\" States one PCP sent him home with percocet when he was having a heart attack. States another PCP \"banged on his desk so hard things fell off it and he yelled at me\". Discussed importance of having a PCP he can trust and he would like to try Dr Lomeli at [facility]. She doesn't have immediate openings for hospital f/u but he will see Davion JAVIER on 4/28 in the meantime. He also needs f/u with cardiology in 6 weeks so appt made with [facility]. He needs to see neurology to see if he is ok to start NOAC in a month and appt made for him with Gloria Beebe for that.   Pt needs PT/OT/SLP. His wife works full time during the " "day. Discussed if other family or friends could take him to appts. He states no. Discussed taxis, Uber rides, etc. Pt is not excited about this. Pt has [employer] application and felt he could use that but explained that this would take at least a few weeks to get approval. Talked with his wife by phone. She works 8:30a to 6p. She states they don't have family or friends that could help as children work. Discussed possibility of home care but pt isn't and doesn't want to be homebound. He is afraid to drive d/t his field cut and he is not cleared to drive but wife states that he would be \"unbearable\" if he couldn't go out to eat, etc. Discussed options of scheduling therapies together and if family could take time off a little early or go in late and use Uber at other times until [employer] kicks in. She thinks they can work something out. Explained the importance of early therapy for strokes and pt understands this. Consulted with SW for other transportation options but none available.  Pt is frustrated by what he perceives as poor quality medical care. He can become non-compliant if things are not explained very clearly to him. He expresses a desire to improve his health, he seems to eat very healthy, but he seems to sabotage his care when he feels it isn't going well. He would benefit from close clinic care coordination follow up, especially to make sure he gets [employer] filled out ASAP.  Trupti HUIZAR performed chart review in advance of plan to make initial outreach to patient today.     After review of chart, it is noted that the patient already received TWO follow up calls from Care Team today including Getachew Montes De Oca, RN with Cardiology Services and PCP/Triage RN for hospital follow-up.      NOTE:  Patient has a future appointment with PCP, Ian oCrdova PA-C on Friday 04/28/2017.       Plan:  CCRN will outreach to patient in 1-2 business days for initial outreach and will remain available " to patient in that timeframe should s/he need anything.       Abril Dawson, RN  [facility] Care Coordinator - [place] and [place] Locations   Direct:  [phone] (voicemail available)

## 2017-04-27 NOTE — LETTER
[facility]  Complex Care Plan  About Me  Patient Name:  Jayro Yancey    YOB: 1950  Age:   66 year old   Trevor MRN: [id number] Telephone Information:     Home Phone [phone]   Mobile [phone]       Address:    [address] [place] [address] Email address:  No e-mail address on record      Emergency Contact(s)  Name Relationship Lgl Grd Work Phone Home Phone Mobile Phone   1. DHARA YANCEY Spouse No [phone]           Primary language:  English     needed? No   [place] [facility]:  [phone] op. 1  Other communication barriers: No  Preferred Method of Communication:  Phone, Letter  Current living arrangement: I live in a private home with spouse (Two-level home with 13 stairs/railing.)  Mobility Status/ Medical Equipment: Independent w/Device  Other information to know about me:    Health Maintenance  Health Maintenance Reviewed: Not assessed    My Access Plan  Medical Emergency 911   Primary Clinic Line [facility]- [phone]   24 Hour Appointment Line [phone] or  [phone] ([phone]) (toll-free)   24 Hour Nurse Line [phone] (toll-free)   Preferred Urgent Care [facility] [place], [phone]   Preferred Hospital [facility]  [phone]   Preferred Pharmacy [facility] [place]     Behavioral Health Crisis Line [facility], [phone] or 911     My Care Team Members  Patient Care Team       Relationship Specialty Notifications Start End    Davion Cordova PA-C PCP - General Physician Assistant  4/26/17     Phone: [phone] Fax: [phone]         [facility] [address] [place] [address]    Johnathan Mckeon MD MD Cardiology  11/18/13     Comment:  cardiology     Phone: [phone] Fax: [phone]          [facility] [address] [place] [address]    Masters,  Abril HASTINGS Clinic Care Coordinator   4/27/17     Comment:  Ian Cordova PA-C    Phone: [phone]              My Care Plans  Self Management and Treatment Plan  Goals and (Comments)  Goal #1: I will weigh myself every morning and report an increase of 2+ pounds to my clinic.       50% (Ongoing) of goal reached    Goal #2: I will take my medications as directed.       90% (Ongoing) of goal reached    Goal #3: I will monitor my blood glucose levels 3-4x daily, as directed.       90% (Ongoing) of goal reached    Action Plans on File: None  Advance Care Plans/Directives Type:   Type Advanced Care Plans/Directives:  (N/A)    My Medical and Care Information  Problem List   Patient Active Problem List   Diagnosis     Generalized osteoarthrosis, unspecified site     Tobacco use disorder     Hypertension goal BP (blood pressure) < 140/90     Benign neoplasm of lower jaw bone     Abdominal pain, right upper quadrant     Diabetes mellitus, type 2 (H)     Cardiovascular disease     Type 2 diabetes, HbA1C goal < 8% (H)     CARDIOVASCULAR SCREENING; LDL GOAL LESS THAN 100     Health Care Home     Cardiomyopathy (H)     CAD (coronary artery disease)     CVA (cerebral vascular accident) (H)      Current Medications and Allergies:  See printed Medication Report.    Care Coordination Start Date: 04/28/17   Frequency of Care Coordination: Q week    Form Last Updated: 04/28/2017

## 2017-04-27 NOTE — TELEPHONE ENCOUNTER
"Hospital/TCU/ED for chronic condition Discharge Protocol    \"Hi, my name is India Anderson, a registered nurse, and I am calling from [facility].  I am calling to follow up and see how things are going for you after your recent emergency visit/hospital/TCU stay.\"    Tell me how you are doing now that you are home?\" doing ok, having trouble with vision. Just sore across back of his head. This helped and helped him sleep. Short term memory issues. He is supposed to have  Out patient therapy as well. He needs a referral. [employer] form as well.       Discharge Instructions    \"Let's review your discharge instructions.  What is/are the follow-up recommendations?  Pt. Response: seeing Ian tomorrow.     \"Has an appointment with your primary care provider been scheduled?\"   Yes. (confirm)    \"When you see the provider, I would recommend that you bring your medications with you.\"    Medications    \"Tell me what changed about your medicines when you discharged?\"    Changes to chronic meds?    0-1    \"What questions do you have about your medications?\"    None     New diagnoses of heart failure, COPD, diabetes, or MI?    No     On insulin: \"Did you start on insulin in the hospital or did you have your insulin dose changed?\"  No         Medication reconciliation completed? Yes  Was MTM referral placed (*Make sure to put transitions as reason for referral)?   No    Call Summary    \"What questions or concerns do you have about your recent visit and your follow-up care?\"     none-has questions about his medications and new doses of these. He needs to cut some of them in half.     \"If you have questions or things don't continue to improve, we encourage you contact us through the main clinic number (give number).  Even if the clinic is not open, triage nurses are available 24/7 to help you.     We would like you to know that our clinic has extended hours (provide information).  We also have urgent care (provide " "details on closest location and hours/contact info)\"      \"Thank you for your time and take care!\"     India Anderson, RN  Triage Nurse             "

## 2017-04-27 NOTE — TELEPHONE ENCOUNTER
ED / Discharge Outreach Protocol    Patient Contact    Attempt # 1    Was call answered?  No.  Unable to leave message. Phone disconnected.    India Anderson, RN  Triage Nurse

## 2017-04-27 NOTE — PROGRESS NOTES
Called patient to discuss any post hospital d/c questions he may have, review medication changes, and confirm f/u appts. Patient denied any questions regarding new medications or changes to some of his current medications that he was taking prior to admission. Patient denied any SOB, chest pain, or light headedness. RN confirmed with patient that he has an apt scheduled on 5/24/17 with FLORA Anni Bradley. Patient advised to call clinic with any cardiac related questions or concerns prior to his apt on 5/24/17. Patient verbalized understanding and agreed with plan.

## 2017-04-27 NOTE — PLAN OF CARE
Problem: Goal Outcome Summary  Goal: Goal Outcome Summary  Occupational Therapy Discharge Summary     Reason for therapy discharge:    Discharged to home with outpatient therapy.     Progress towards therapy goal(s). See goals on Care Plan in [facility] electronic health record for goal details.  Goals not met.  Barriers to achieving goals:   discharge from facility.     Therapy recommendation(s):    Continued therapy is recommended.  Rationale/Recommendations:  to maximize visual skills.

## 2017-04-28 ENCOUNTER — OFFICE VISIT (OUTPATIENT)
Dept: FAMILY MEDICINE | Facility: CLINIC | Age: 67
End: 2017-04-28
Payer: COMMERCIAL

## 2017-04-28 VITALS
OXYGEN SATURATION: 100 % | WEIGHT: 224 LBS | HEIGHT: 72 IN | DIASTOLIC BLOOD PRESSURE: 84 MMHG | HEART RATE: 62 BPM | BODY MASS INDEX: 30.34 KG/M2 | TEMPERATURE: 97.3 F | SYSTOLIC BLOOD PRESSURE: 138 MMHG

## 2017-04-28 DIAGNOSIS — I25.10 CORONARY ARTERY DISEASE INVOLVING NATIVE CORONARY ARTERY OF NATIVE HEART WITHOUT ANGINA PECTORIS: ICD-10-CM

## 2017-04-28 DIAGNOSIS — H54.61 VISION LOSS OF RIGHT EYE: ICD-10-CM

## 2017-04-28 DIAGNOSIS — I63.432 CEREBROVASCULAR ACCIDENT (CVA) DUE TO EMBOLISM OF LEFT POSTERIOR CEREBRAL ARTERY (H): Primary | ICD-10-CM

## 2017-04-28 DIAGNOSIS — I48.91 ATRIAL FIBRILLATION, UNSPECIFIED TYPE (H): ICD-10-CM

## 2017-04-28 DIAGNOSIS — E11.65 TYPE 2 DIABETES MELLITUS WITH HYPERGLYCEMIA, UNSPECIFIED LONG TERM INSULIN USE STATUS: ICD-10-CM

## 2017-04-28 PROCEDURE — 99495 TRANSJ CARE MGMT MOD F2F 14D: CPT | Performed by: PHYSICIAN ASSISTANT

## 2017-04-28 NOTE — MR AVS SNAPSHOT
After Visit Summary   4/28/2017    Jayro Elder    MRN: [id number]           Patient Information     Date Of Birth          1950        Visit Information        Provider Department      4/28/2017 3:20 PM Davion Cordova PA-C [facility]        Today's Diagnoses     Cerebrovascular accident (CVA) due to embolism of left posterior cerebral artery (H)    -  1    Atrial fibrillation, unspecified type (H)        Type 2 diabetes mellitus with hyperglycemia, unspecified long term insulin use status (H)        Vision loss of right eye           Follow-ups after your visit        Your next 10 appointments already scheduled     May 01, 2017  3:00 PM CDT   TELEMEDICINE with Mel [facility], [facility] ([facility])    [address]  [address]  [place] [address] [phone]           Note: this is not an onsite visit; there is no need to come to the facility.            May 05, 2017  3:30 PM CDT   Evaluation with Vivian Plunkett, SLP   [facility] ([facility])    [address] [address] [phone]            May 08, 2017  2:00 PM CDT   Evaluation with Denisa Hannon, PT   [facility] ([facility])    [address] [address] [phone]            May 15, 2017  1:30 PM CDT   Evaluation with Chloé Luu, OT   [facility]ational [facility] ([facility])    [address] [address] [phone]            May 24, 2017  1:50 PM CDT   Return Visit with LORRAINE Fung CNP   [facility] [place] ([facility])    [address]  [place] [address] [phone]              Who to contact     If you have questions or need follow up information about today's clinic visit or your schedule  "please contact [facility] directly at [phone].  Normal or non-critical lab and imaging results will be communicated to you by MyChart, letter or phone within 4 business days after the clinic has received the results. If you do not hear from us within 7 days, please contact the clinic through MyChart or phone. If you have a critical or abnormal lab result, we will notify you by phone as soon as possible.  Submit refill requests through [employer] or call your pharmacy and they will forward the refill request to us. Please allow 3 business days for your refill to be completed.          Additional Information About Your Visit        [employer] Information     [employer] lets you send messages to your doctor, view your test results, renew your prescriptions, schedule appointments and more. To sign up, go to [email or web address] . Click on \"Log in\" on the left side of the screen, which will take you to the Welcome page. Then click on \"Sign up Now\" on the right side of the page.     You will be asked to enter the access code listed below, as well as some personal information. Please follow the directions to create your username and password.     Your access code is: [email or web address]  Expires: 2017 11:13 AM     Your access code will  in 90 days. If you need help or a new code, please call your [place] clinic or [phone].        Care EveryWhere ID     This is your Care EveryWhere ID. This could be used by other organizations to access your [place] medical records  [id number]        Your Vitals Were     Pulse Temperature Height Pulse Oximetry BMI (Body Mass Index)       62 97.3  F (36.3  C) (Oral) 6' (1.829 m) 100% 30.38 kg/m2        Blood Pressure from Last 3 Encounters:   17 138/84   17 139/87   17 132/80    Weight from Last 3 Encounters:   17 224 lb (101.6 kg)   17 229 lb 0.9 oz (103.9 kg)   17 217 lb 6.4 oz (98.6 kg)              Today, you had the " following     No orders found for display       Primary Care Provider Office Phone # Fax #    Davion Miki Cordova PA-C [phone]       [facility] [address]  [facility] [address]        Thank you!     Thank you for choosing [facility]  for your care. Our goal is always to provide you with excellent care. Hearing back from our patients is one way we can continue to improve our services. Please take a few minutes to complete the written survey that you may receive in the mail after your visit with us. Thank you!             Your Updated Medication List - Protect others around you: Learn how to safely use, store and throw away your medicines at www.disposemymeds.org.          This list is accurate as of: 4/28/17  4:22 PM.  Always use your most recent med list.                   Brand Name Dispense Instructions for use    amLODIPine 5 MG tablet    NORVASC    90 tablet    Take 1 tablet (5 mg) by mouth 2 times daily       insulin glargine 100 UNIT/ML injection     6 mL    Inject 20 Units Subcutaneous every 24 hours       isosorbide mononitrate 30 MG 24 hr tablet    IMDUR    15 tablet    Take 0.5 tablets (15 mg) by mouth daily       JANUVIA 50 MG tablet   Generic drug:  sitagliptin      Take 50 mg by mouth daily       levothyroxine 137 MCG tablet    SYNTHROID/LEVOTHROID    90 tablet    Take 137 mcg by mouth At Bedtime       lisinopril 20 MG tablet    PRINIVIL/ZESTRIL    30 tablet    Take 2 tablets (40 mg) by mouth At Bedtime       metFORMIN 1000 MG tablet    GLUCOPHAGE    60 tablet    Take 1 tablet (1,000 mg) by mouth 2 times daily (with meals)       metoprolol 100 MG 24 hr tablet    TOPROL-XL    90 tablet    Take 1 tablet (100 mg) by mouth At Bedtime       nitroglycerin 0.4 MG sublingual tablet    NITROSTAT    60 tablet    Place 1 tablet (0.4 mg) under the tongue every 5 minutes as needed for chest pain       ONE TOUCH LANCETS Misc     120    use qid       ONE TOUCH  ULTRA test strip   Generic drug:  blood glucose monitoring     120    use qid       order for DME     1 each    Equipment being ordered: Cane () Treatment Diagnosis: Impaired balance       pantoprazole 40 MG EC tablet    PROTONIX    30 tablet    Take 1 tablet (40 mg) by mouth every morning (before breakfast)       simvastatin 40 MG tablet    ZOCOR    90 tablet    Take 1 tablet (40 mg) by mouth At Bedtime

## 2017-04-28 NOTE — PROGRESS NOTES
"Clinic Care Coordination Contact  OUTREACH    Referral Information:  Referral Source: CTS  Reason for Contact:  Initial outreach by Care Coordination.   Care Conference: No     Universal Utilization:   ED Visits in last year: 1  Hospital visits in last year: 1  Last PCP appointment: 03/31/17  Missed Appointments:  (N/A)  Concerns: See 04/26/2017 CTS - multiple concerns.   Multiple Providers or Specialists: Yes (See Care Team)    Clinical Concerns:    Current Medical Concerns:    1.  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   Patient c/o persistent headache and intermittent weakness since CVA occurrence. Activity as tolerated.   Patient states that he has typically been resting since he has been home.     2.  New dx:  AFib (with RBBB) with controlled ventricular rate.   Patient has significant cardiac history, including chronic systolic CHF, CAD and 6 stents in place.   He reportedly had several episodes of chest pain while IP that were investigated.   He continues to c/o discomfort in chest since DC to home, but states that it is not new or worse.    He reports that he follows a low-salt diet \"as much as I can\" and limited to no processed foods at home. He tries to check daily weights, but that doesn't always occur.  Weight today (04/28/2017): 217lb.   He did not weigh himself 04/27/2017.      Wt Readings from Last 4 Encounters:   04/22/17 229 lb 0.9 oz (103.9 kg)   03/31/17 217 lb 6.4 oz (98.6 kg)   08/19/16 223 lb (101.2 kg)   06/30/16 223 lb (101.2 kg)         3.  Poorly controlled DM - Type 2 with peripheral neuropathy.   Patient reports that he practices a diabetic diet at home with no intake of potatoes or noodles and seldom eating bread.     Lab Results   Component Value Date    GLC 76 04/26/2017    GLC 91 04/25/2017     04/24/2017    GLC 84 04/23/2017     04/22/2017     04/21/2017     " "2017     2017       Lab Results   Component Value Date    A1C 11.7 2017    A1C 13.1 2017    A1C 10.5 2014    A1C 10.4 2014    A1C 9.2 2013         Current Behavioral Concerns:  No concerns identified today.      Education Provided to patient: CCRN reviewed Discharge Summary in detail with patient today.       Clinical Pathway Name: None    Medication Management:  CCRN reviewed all medications in detail with patient today including new medications, medication changes, discontinued medications and those that are unchanged from recent IP stay.   Patient is newly on:  Imdur 30mg - 0.5 tab daily.   He is taking Basaglar Kwikpen - 20U q24 hours as directed.   He does NOT feel that this is controlling his BG as it should be.    BG this mornin (fasting) and 2017- hs:  \"200-something\"   He checks BG 3-4x daily (pre-meal and qhs).  CCRN reviewed that patient should NOT be using - IBU, ASA or Plavix.    He is taking APAP ES 500mg for mild discomforts and headache.  He reports that he tries to take \"as little as I can\", using typically 4-6 tabs MAX daily.   CCRN reviewed MAX dosing of APAP daily is 4000mg, however we prefer patients to stay at 3000mg MAX unless necessary.     Patient is upset that the hospitalist/IP Care Team only discharged him with a minimum amount of medications to last him.   CCRN advised this is typically encourage patients to follow up as directed and to have further discussion and follow up surrounding medication use.     Functional Status:  Mobility Status: Independent w/Device.   Patient is awaiting arrival of cane that he ordered.   He has a 2WW available in home for use.   Equipment Currently Used at Home: walker, rolling, cane, straight ((Cane is on order as of 2017.))  Transportation: Prior to CVA, patient was independent with driving.   He now relies on community transportation services or family for rides.   He is very frustrated " "by the limited options that he has available to him in this regard.   As of today, he states that he is NOT going to pursue [employer] application as he doesn't like their limitations and feels that he would be \"chained to this house\" without options.  He states that he does not have any friends, neighbors, members of local Yazidism/Hinduism, etc available to help him either.   He is on a fixed income, so paying for DARTs or cab rides is not a good option for him either.     Patient REFUSED [facility] services while IP as he does not want to be considered homebound.       He is to follow [facility] including PT, OT and Speech Therapy.   He was not given any home exercises to start in the meantime.   He is scheduled to see each of these disciplines as noted - ST: 05/05/17, PT: 05/08/17 and OT: 05/15/17.   He is frustrated that these are the soonest available dates to start therapies as he was advised to pursue them IMMEDIATELY at DC to home.     He has a CPAP in the home but has NOT used it in 1+ years as the mask was not comfortable for him.   He was never refitted with a more comfortable mask.  He has not followed up with Sleep Medicine to date either.     He wears glasses and recently purchased a new pair of them within the past few months.  He is disappointed with this as now he has a NEW RIGHT field vision cut and will more than likely need a new pair of glasses.     Psychosocial:  Current living arrangement:: I live in a private home with spouse (Two-level home with 13 stairs/railing.)  His wife requested that he NOT sleep in the bedroom and moved his bed to the living room for the time being so that he could be on the main level with access to the kitchen and bathroom (though the main level bathroom does not have a shower/tub.)  Financial/Insurance: Patient receives SS disability benefits.  He is on a fixed income.    He has BCBS with Medicare- Part A ONLY.   He and his wife went to several seminars on " "Medicare plans and also visited with a .   It was decided that, with his wife continuing to work, it would NOT make sense to enroll in Medicare - Part B, as then they would have to each carry different insurance.   He does not receive any other form of assistance, per report.   He does not seem interested in any resources at this time.     He states that his wife is very supportive, however she has \"too many\" of her own health concerns, herself having a past history of 3 CVAs.    She continues to work outside of the home.   He has 4 children :  Son - Leonard ([place]),  Son- John ([place]),  Son ([place]) and Daughter (Charlene [place]) whom are all supportive but busy with their own lives.  He has 7 grandchildren, ranging in age from 4 months old to 9 years old.   Family was very supportive and concerned while he was IP; visited often.        Resources and Interventions:  Current Resources: DME; Other (see comment)  PAS Number:  (N/A)  Senior Linkage Line Referral Placed:  (N/A)  Advanced Care Plans/Directives on file:: No (FULL CODE)  Referrals Placed: MTM, Transportation, Other - See below (Discussed transportation options with patient today. )     Goals:   Goal 1 Statement: I will weigh myself every morning and report an increase of 2+ pounds to my clinic.   Goal 1 Progression Percent: 50% (Ongoing)  Goal 1 Progression Date: 04/28/17    Goal 2 Statement: I will take my medications as directed.   Goal 2 Progression Percent: 90% (Ongoing)  Goal 2 Progression Date: 04/28/17    Goal 3 Statement: I will monitor my blood glucose levels 3-4x daily, as directed.   Goal 3 Progression Percent: 90% (Ongoing)  Goal 3 Progression Date: 04/28/17        Frequency of Care Coordination: Q week   Upcoming appointment: 04/28/17 (Ian Cordova PA-C; transportation is an issue. )    Patient is to follow up with the following specialties:  -OP Rehab:  PT, OT and Speech Therapy  - Cardiology :  4-6 weeks with Dr." Mike.  HOLD ASA and Plavix until seen by Neurology.   - Neurology:  3 weeks with repeat brain imaging.  HOLD ASA and Plavix until this follow-up.  - Endocrinology:  A few weeks post-DC to home.   -Podiatry:  DELAYED until recovery from CVA.   - PCP:  Within a WEEK post-DC.   PCP to address diabetic control at that appointment.   *CCRN reviewed all of this information with patient today.      Plan: Forwarding to PCP for review, prior to appointment today 04/28/2017 - if he is able to find transportation.    [facility] Care Plan mailed to home.  CCRN will outreach to patient in 1 week and will remain available to patient in that timeframe should he need anything.       Patient/Caregiver understanding: Patient agreed with plan.     Abril Dawson, RN  [facility]  Clinic Care Coordinator - [place] and [place] Locations   Direct:  [phone] (voicemail available)

## 2017-04-28 NOTE — NURSING NOTE
Chief Complaint   Patient presents with     Hospital F/U       Initial /86  Pulse 62  Temp 97.3  F (36.3  C) (Oral)  Ht 6' (1.829 m)  Wt 224 lb (101.6 kg)  SpO2 100%  BMI 30.38 kg/m2 Estimated body mass index is 30.38 kg/(m^2) as calculated from the following:    Height as of this encounter: 6' (1.829 m).    Weight as of this encounter: 224 lb (101.6 kg).  Medication Reconciliation: complete   Edwardo Peña CMA

## 2017-04-28 NOTE — PROGRESS NOTES
Clinic Care Coordination Contact  Care Team Conversations    CCRN received update from Che Steinberg that patient is having difficulty finding transportation to his appointment today with Ian Cordova PA-C at 3:20pm.   Per her report to writer:  The patient informed her that [employer] would charge $55 for ride, as would a cab service.   Patient and his wife are not able to utilize [employer] due to technology and inability to understand how to use their website.   Patient's wife is not able to take of any more time from work.   Patient's son is also not able to get to patient in time before appointment start.     Patient has [employer] application but it still needs completion and processing.     CCRN reviewed the following options:  [employer] - see above info from patient.   [employer]  [employer] ([phone])  [employer] ([phone])  [employer] ([phone])  [employer] ([phone]): They provide w/c transportation.     Che checked into all of these options for patient and none of them are able to provide patient with a ride today due to booked schedules.     *See below entry by [facility] Care Transitions Team re: transportation difficulties.   This information was reviewed in detail with patient prior to his DC to home by their team.     CCRN does not have any other options for transportation at this time.       Abril Dawson, KRYSTAL  [facility]  Clinic Care Coordinator - [place] and [place] Locations   Direct:  [phone] (voicemail available)

## 2017-04-28 NOTE — PROGRESS NOTES
"  SUBJECTIVE:                                                    Jayro Elder is a 66 year old male who presents to clinic today for the following health issues:    Hospital Follow-up Visit:    Hospital/Nursing Home/IP Rehab Facility: [facility]  Date of Admission: 4/20/17  Date of Discharge: 4/26/17  Reason(s) for Admission: Patient with dx of CAD, DM2, HTN originally presented to ED for vision changes    -He had actually been having symptoms for around 2 1/2 days prior to heading to the hospital  -Had been experiencing increased fatigue, seeing flashes periodically    -Presents today for hospital follow up   -Feels quite fatigued, frustrated with current condition  -Trying to watch some TV, not doing much of anything else  -Historically has been resistant to routine medical care 2/2 \"poor care\"    DM2: Sees Dr Scott, this is a new dx, newly on insulin glargine;   Fasting BS have been high, 170+ since discharge  This is frustrating to patient because he was under excellent control in the hospital    There is persisting visual disturbance along the right eye, peripherally  He ambulates with walker, slowly, feels mildly unsteady; denies falls    Speech pathology have okayed normal diet; does note some appetite suppression.     Denies shortness of breath, chest pain or sensation of heart palpitations    Good family support, kids helping with rides for now  Does not want to pursue [employer] or In-Home nursing because \"i dont want to be confined\"            Problems taking medications regularly:  None       Medication changes since discharge: None       Problems adhering to non-medication therapy:  None    Summary of hospitalization:  [facility] discharge summary reviewed  Diagnostic Tests/Treatments reviewed.  Follow up needed: monitor BS  Other Healthcare Providers Involved in Patient s Care:         Specialist appointment - Endocrine, Neurology, Cardiology and Podiatry  Update since " discharge: stable.     Post Discharge Medication Reconciliation: discharge medications reconciled, continue medications without change.  Plan of care communicated with patient            Problem list and histories reviewed & adjusted, as indicated.  Additional history: as documented    Patient Active Problem List   Diagnosis     Generalized osteoarthrosis, unspecified site     Tobacco use disorder     Hypertension goal BP (blood pressure) < 140/90     Benign neoplasm of lower jaw bone     Abdominal pain, right upper quadrant     Diabetes mellitus, type 2 (H)     Cardiovascular disease     Type 2 diabetes, HbA1C goal < 8% (H)     CARDIOVASCULAR SCREENING; LDL GOAL LESS THAN 100     Health Care Home     Cardiomyopathy (H)     CAD (coronary artery disease)     CVA (cerebral vascular accident) (H)     Past Surgical History:   Procedure Laterality Date     ANGIOGRAM  6/29/05    subtotal occ.mid LAD,SUSAN mid LAD     ANGIOGRAM  7/05    mild CAD,patent stent,no flow-limiting lesions,sev.LV dysf.LAD enlarged     ANGIOGRAM  2/09    Sev.single vessel disease,Mod LV dysf.distal LAD 90%,70-75% mid lad just before prev stent,SUSAN to prox.mid LAD, endeavor to distal LAD     ANGIOGRAM  11/13/13    restenosis, stent LAD     C NONSPECIFIC PROCEDURE      Laminectomy x 3 - (1983 x 2 & 1990)     C NONSPECIFIC PROCEDURE Bilateral 1998    Bunionectomy     C NONSPECIFIC PROCEDURE  1959    Gingival surgery at age 9       Social History   Substance Use Topics     Smoking status: Former Smoker     Packs/day: 1.00     Years: 25.00     Types: Cigarettes     Quit date: 7/25/2005     Smokeless tobacco: Never Used     Alcohol use Yes      Comment: occasional     Family History   Problem Relation Age of Onset     Cancer - colorectal Sister      Thyroid Disease Brother      Cardiovascular Brother      DIABETES Brother      CANCER Father      tumor in chest and throat     Arthritis Mother      Thyroid Disease Mother      DIABETES Mother             Reviewed and updated as needed this visit by clinical staff  Allergies       Reviewed and updated as needed this visit by Provider         ROS:  CONSTITUTIONAL:POSITIVE  for fatigue  INTEGUMENTARY/SKIN: POSITIVE for distal toe ulcerations  EYES: POSITIVE for loss of peripheral vision right  E/M: NEGATIVE for ear, mouth and throat problems  R: NEGATIVE for significant cough or SOB  CV: NEGATIVE for chest pain, palpitations or peripheral edema  NEURO: POSITIVE for weakness, difficult ambulation  PSYCHIATRIC: POSITIVE for frustration    OBJECTIVE:                                                    /84  Pulse 62  Temp 97.3  F (36.3  C) (Oral)  Ht 6' (1.829 m)  Wt 224 lb (101.6 kg)  SpO2 100%  BMI 30.38 kg/m2  Body mass index is 30.38 kg/(m^2).  GENERAL: healthy, alert and no distress  EYES: visual field defect right periphery  HENT: ear canals and TM's normal, nose and mouth without ulcers or lesions  RESP: lungs clear to auscultation - no rales, rhonchi or wheezes  CV: regular rates and rhythm and without ectopy  ABDOMEN: soft, nontender, no hepatosplenomegaly, no masses and bowel sounds normal  MS: ambulates with walker, movements slow and intentioned,   SKIN: there are small necrotic areas along bilateral toes, not currently weeping; minimal edema in the extremeties  NEURO: LE 4/5 strength, there does appear very mild deficit to right sided facial movements, right hemionopsia  PSYCH: affect normal/bright, judgement and insight intact and frustrated    Diagnostic Test Results:  none      ASSESSMENT/PLAN:                                                    1. Cerebrovascular accident (CVA) due to embolism of left posterior cerebral artery (H)  2. Atrial fibrillation, unspecified type (H)  3. Coronary artery disease involving native coronary artery of native heart without angina pectoris  Thought to be embolic with progression to hemorrhagic. Holding anticoagulation until seen by cardiology for follow  up. He is grossly asymptomatic today. We did review medications however after this visit and talking with pharmacy it appears there was still confusion re dosing and his number of medication. These have been updated. He will need close follow up with both cards and neurology. Strongly recommended he continue to pursue OT/PT and his is planning on this. For now he ambulates with walker, somewhat frustrated given new lack of independence. He is planning to use cabs for rides, did not want to use [employer]. BP is wwell controlled today on current regimen.     4. Type 2 diabetes mellitus with hyperglycemia, unspecified long term insulin use status (H)  Will follow with Endocrine. He is frustrated by his poor control following a reduction of insulin when discharged but we did review the risks for hypoglycemia especially as oral medications were added back. He has a telephone call with his specialist this Monday. For now he can plan to increase by 2-3 units the next few days.     5. Vision loss of right eye  Continue to monitor. He is not experiencing dizziness. No other vision changes    He will continue to work with [facility] to get additional help with navigating his current specialty appts and has already been in contact with them.     Davion Cordova PA-C  [facility]

## 2017-05-01 ENCOUNTER — ALLIED HEALTH/NURSE VISIT (OUTPATIENT)
Dept: PHARMACY | Facility: CLINIC | Age: 67
End: 2017-05-01
Payer: COMMERCIAL

## 2017-05-01 DIAGNOSIS — E03.9 HYPOTHYROIDISM, UNSPECIFIED TYPE: ICD-10-CM

## 2017-05-01 DIAGNOSIS — I63.432 CEREBROVASCULAR ACCIDENT (CVA) DUE TO EMBOLISM OF LEFT POSTERIOR CEREBRAL ARTERY (H): Primary | ICD-10-CM

## 2017-05-01 DIAGNOSIS — G44.219 EPISODIC TENSION-TYPE HEADACHE, NOT INTRACTABLE: ICD-10-CM

## 2017-05-01 DIAGNOSIS — I25.10 CORONARY ARTERY DISEASE INVOLVING NATIVE CORONARY ARTERY OF NATIVE HEART WITHOUT ANGINA PECTORIS: ICD-10-CM

## 2017-05-01 DIAGNOSIS — E11.9 TYPE 2 DIABETES MELLITUS WITHOUT COMPLICATION, UNSPECIFIED LONG TERM INSULIN USE STATUS: ICD-10-CM

## 2017-05-01 DIAGNOSIS — K21.9 GASTROESOPHAGEAL REFLUX DISEASE WITHOUT ESOPHAGITIS: ICD-10-CM

## 2017-05-01 DIAGNOSIS — I10 HYPERTENSION GOAL BP (BLOOD PRESSURE) < 140/90: ICD-10-CM

## 2017-05-01 DIAGNOSIS — I48.91 ATRIAL FIBRILLATION, UNSPECIFIED TYPE (H): ICD-10-CM

## 2017-05-01 DIAGNOSIS — Z13.6 CARDIOVASCULAR SCREENING; LDL GOAL LESS THAN 100: ICD-10-CM

## 2017-05-01 DIAGNOSIS — R07.89 OTHER CHEST PAIN: ICD-10-CM

## 2017-05-01 PROCEDURE — 99607 MTMS BY PHARM ADDL 15 MIN: CPT | Performed by: PHARMACIST

## 2017-05-01 PROCEDURE — 99605 MTMS BY PHARM NP 15 MIN: CPT | Performed by: PHARMACIST

## 2017-05-01 NOTE — LETTER
[facility]  [address]  [address]  [place] [address] [phone]    May 1, 2017    Jayro Elder  [address] [place] [address]        Dear Jayro,    It was so nice to speak with you on May 1st.  I hope I was able to give you some useful information during our Medication Therapy Management (MTM) visit. The purpose of this visit with a clinical pharmacist was to review the medicines you received when discharged from the hospital. We want to make sure that you know which medicines to take and what they are for. We also want to make sure all your medicines are working, safe, and as easy to take as possible.    Enclosed is a summary of the suggestions we talked about and any other thoughts I had. There is also a list of your medicines included. This information has also been shared with your primary care provider.    Feel free to call if you have any questions or concerns. By working together with you and your doctor, I hope to help you feel confident managing your medicines and improving your quality of life.       [place] wishes,      Vivi Morris, PharmD  Medication Therapy Management Resident  Pager: [phone]

## 2017-05-01 NOTE — PATIENT INSTRUCTIONS
Recommendations from today's MTM visit:                                                    MTM (medication therapy management) is a service provided by a clinical pharmacist designed to help you get the most of out of your medicines.   Today we reviewed what your medicines are for, how to know if they are working, that your medicines are safe and how to make your medicine regimen as easy as possible.     1. Make an appointment with endocrinology as planned regarding thyroid nodules  2. MTM will discuss refills and directions of medications to get you through to the cardiology appointment (lisinopril, nitroglycerin, amlodipine)   3. MTM will discuss cholesterol medication with cardiology/Dr. Cordova   4. Ask for 90-day supplies of blood pressure/heart medications at cardiologist appointment  5. MTM will ask about getting you a blood pressure monitor for home  6. Ask your cardiologist about ongoing anticoagulation   7. Take amlodipine 5 mg BID as directed    Next MTM visit: 1 month     To schedule another MTM appointment, please call the clinic directly or you may call the MTM scheduling line at [phone] or toll-free at [phone].     My Clinical Pharmacist's contact information:                                                      It was a pleasure speaking with you today!  Please feel free to contact me with any questions or concerns you have.      Vivi Morris, Boris  Medication Therapy Management Resident  Pager: [phone]    You may receive a survey about the MTM services you received.  I would appreciate your feedback to help me serve you better in the future. Please fill it out and return it when you can. Your comments will be anonymous.

## 2017-05-01 NOTE — MR AVS SNAPSHOT
After Visit Summary   5/1/2017    Jayro Elder    MRN: [id number]           Patient Information     Date Of Birth          1950        Visit Information        Provider Department      5/1/2017 3:00 PM Mel Olivo, [facility] [place] [facility]        Today's Diagnoses     Cerebrovascular accident (CVA) due to embolism of left posterior cerebral artery (H)    -  1    Type 2 diabetes mellitus without complication, unspecified long term insulin use status (H)        Atrial fibrillation, unspecified type (H)        Coronary artery disease involving native coronary artery of native heart without angina pectoris        Hypertension goal BP (blood pressure) < 140/90        CARDIOVASCULAR SCREENING; LDL GOAL LESS THAN 100        Hypothyroidism, unspecified type        Episodic tension-type headache, not intractable        Gastroesophageal reflux disease without esophagitis          Care Instructions    Recommendations from today's MTM visit:                                                    MTM (medication therapy management) is a service provided by a clinical pharmacist designed to help you get the most of out of your medicines.   Today we reviewed what your medicines are for, how to know if they are working, that your medicines are safe and how to make your medicine regimen as easy as possible.     1. Make an appointment with endocrinology as planned regarding thyroid nodules  2. MTM will discuss refills and directions of medications to get you through to the cardiology appointment (lisinopril, nitroglycerin, amlodipine)   3. MTM will discuss cholesterol medication with cardiology/Dr. Cordova   4. Ask for 90-day supplies of blood pressure/heart medications at cardiologist appointment  5. MTM will ask about getting you a blood pressure monitor for home  6. Ask your cardiologist about ongoing anticoagulation   7. Take amlodipine 5 mg BID as directed    Next MTM visit: 1 month     To  schedule another MTM appointment, please call the clinic directly or you may call the MTM scheduling line at [phone] or toll-free at [phone].     My Clinical Pharmacist's contact information:                                                      It was a pleasure speaking with you today!  Please feel free to contact me with any questions or concerns you have.      Vivi Morris, Boris  Medication Therapy Management Resident  Pager: [phone]    You may receive a survey about the MT services you received.  I would appreciate your feedback to help me serve you better in the future. Please fill it out and return it when you can. Your comments will be anonymous.                  Follow-ups after your visit        Your next 10 appointments already scheduled     May 05, 2017  3:30 PM CDT   Evaluation with Vivian Plunkett, SLP   [facility] Speech Therapy ([facility])    [address] [address] [phone]            May 08, 2017  2:00 PM CDT   Evaluation with Denisa Hannon, PT   [facility] Physical Therapy ([facility])    [address] [place] [address] [phone]            May 15, 2017  1:30 PM CDT   Evaluation with Chloé Luu, OT   [facility] Occupational Therapy ([facility])    [address] [place] [address] [phone]            May 24, 2017  1:50 PM CDT   Return Visit with LORRAINE Fung CNP   [facility] PHYSICIANS [facility]ART AT [place] ([facility])    [address]  [facility] [facility] [address] [phone]              Who to contact     If you have questions or need follow up information about today's clinic visit or your schedule please contact [facility] directly at [phone].  Normal or non-critical lab and imaging results will be communicated to you by MyChart, letter or phone within 4 business days  "after the clinic has received the results. If you do not hear from us within 7 days, please contact the clinic through [employer] or phone. If you have a critical or abnormal lab result, we will notify you by phone as soon as possible.  Submit refill requests through [employer] or call your pharmacy and they will forward the refill request to us. Please allow 3 business days for your refill to be completed.          Additional Information About Your Visit        Influitive Information     Influitive lets you send messages to your doctor, view your test results, renew your prescriptions, schedule appointments and more. To sign up, go to [email or web address] . Click on \"Log in\" on the left side of the screen, which will take you to the Welcome page. Then click on \"Sign up Now\" on the right side of the page.     You will be asked to enter the access code listed below, as well as some personal information. Please follow the directions to create your username and password.     Your access code is: [email or web address]  Expires: 2017 11:13 AM     Your access code will  in 90 days. If you need help or a new code, please call your [facility] clinic or [phone].        Care EveryWhere ID     This is your Care EveryWhere ID. This could be used by other organizations to access your [facility] medical records  [id number]         Blood Pressure from Last 3 Encounters:   17 138/84   17 139/87   17 132/80    Weight from Last 3 Encounters:   17 224 lb (101.6 kg)   17 229 lb 0.9 oz (103.9 kg)   17 217 lb 6.4 oz (98.6 kg)              Today, you had the following     No orders found for display       Primary Care Provider Office Phone # Fax #    Davion Cordova PA-C [phone]       [facility] [address]  [facility] [address]        Thank you!     Thank you for choosing [facility]  for your care. Our goal is always to provide you with excellent " care. Hearing back from our patients is one way we can continue to improve our services. Please take a few minutes to complete the written survey that you may receive in the mail after your visit with us. Thank you!             Your Updated Medication List - Protect others around you: Learn how to safely use, store and throw away your medicines at www.disposemymeds.org.          This list is accurate as of: 5/1/17  6:11 PM.  Always use your most recent med list.                   Brand Name Dispense Instructions for use    amLODIPine 5 MG tablet    NORVASC    90 tablet    Take 1 tablet (5 mg) by mouth 2 times daily       insulin glargine 100 UNIT/ML injection     6 mL    Inject 20 Units Subcutaneous every 24 hours       isosorbide mononitrate 30 MG 24 hr tablet    IMDUR    15 tablet    Take 0.5 tablets (15 mg) by mouth daily       JANUVIA 50 MG tablet   Generic drug:  sitagliptin      Take 50 mg by mouth daily       levothyroxine 137 MCG tablet    SYNTHROID/LEVOTHROID    90 tablet    Take 137 mcg by mouth At Bedtime       lisinopril 20 MG tablet    PRINIVIL/ZESTRIL    30 tablet    Take 2 tablets (40 mg) by mouth At Bedtime       metFORMIN 1000 MG tablet    GLUCOPHAGE    60 tablet    Take 1 tablet (1,000 mg) by mouth 2 times daily (with meals)       metoprolol 100 MG 24 hr tablet    TOPROL-XL    90 tablet    Take 1 tablet (100 mg) by mouth At Bedtime       nitroglycerin 0.4 MG sublingual tablet    NITROSTAT    60 tablet    Place 1 tablet (0.4 mg) under the tongue every 5 minutes as needed for chest pain       ONE TOUCH LANCETS Misc     120    use qid       ONE TOUCH ULTRA test strip   Generic drug:  blood glucose monitoring     120    use qid       order for DME     1 each    Equipment being ordered: Cane () Treatment Diagnosis: Impaired balance       pantoprazole 40 MG EC tablet    PROTONIX    30 tablet    Take 1 tablet (40 mg) by mouth every morning (before breakfast)       simvastatin 40 MG tablet    ZOCOR     90 tablet    Take 1 tablet (40 mg) by mouth At Bedtime

## 2017-05-01 NOTE — PROGRESS NOTES
SUBJECTIVE/OBJECTIVE:                                                    Jayro Elder is a 66 year old male called for a transitions of care visit.  He was discharged from [facility] on  for left occipital stroke and atrial fibrillation.     Chief Complaint: Confused about medication dosing that was changed during hospitalization.    Allergies/ADRs: Reviewed in Epic  Tobacco: No tobacco use   Alcohol: 1-3 beverages / week  Activity: None, due to recent stroke    Medication Adherence: issues found and discussed below. He has a difficult time getting around since he needs to be driven everywhere so it is hard for him to get to the pharmacy/appointments. Mail order is not covered by his insurance, per pt.     Hypertension: Current medications include amlodipine 5 daily (written for BID), lisinopril 40 mg daily, and metoprolol 100 mg daily. He is also on isosorbide mononitrate 15 mg daily. He reports taking amlodipine 2.5 mg daily and lisinopril 20 mg prior to hospitalization, and feels very unsettled about the dosing. He also reports that he got varying days supplies from the pharmacy ranging from 15 to 45.  He feels like this amlodipine was doubled on admission and doubled again at discharge, so he has remained at amlodipine 5 mg daily.  Patient does not self-monitor BP.  Patient reports feeling dizziness and lightheadedness, which started with stroke. He is still having some confusion, that's why he doesn't leave the house right now. He saw his provider on Friday, he was unable to tell me what went on during the visit. Thought he mentioned these feelings but doesn't remember what the provider said.    CVA/Afib/CAD: Current therapy includes isosorbide mononitrate 15 mg daily and metoprolol 100 mg at bedtime. He reports his SL NTG is  and he is interested in having this renewed. Chest pain is much better since starting isosorbide mononitrate . He is not sure if he is having palpitations.  Ibuprofen, aspirin, and plavix have been stopped due to recent bleed. He has a scheduled follow-up with cardiology 5/24. Pt is not currently on anticoagulation given bleed.    [id number] estimated to be 6    Diabetes:  Pt currently taking 30 units Basaglar daily (written for 20 units daily), Januvia 50 mg daily, and metformin 1000 mg BID. Was previously taking 40 units Lantus in the hospital and he switched back to Lantus because he doesn't feel the Basaglar works. He was on 40 units prior to admission, and is frustrated that they reduced his dose to 20 units. Dose was decreased in hospital due to episodes of hypoglycemia, none seen at home.  His blood sugars have been difficult to control since returning home. He recently saw Endocrinology (about 1.5 weeks before incident) and they gave him directions for insulin titration up to 50 units daily. Patient has Contour Next meter. Sees Dr. Tyler LAROSE endocrinology. Pt is not experiencing side effects.  SMBG: three times daily.   Ranges (patient reported): 459 yesterday 174 this morning  154 today  Symptoms of low blood sugar? none. Frequency of hypoglycemia? never.  Recent symptoms of high blood sugar? tingling  Eye exam: due  Foot exam: due  Microalbumin unknown. Pt is taking an ACEi/ARB.  Aspirin: Not taking due to recent bleed  Diet/Exercise: None, due to recent stroke    Hyperlipidemia: Current therapy includes simvastatin 40 mg once daily.  Pt reports no significant myalgias or other side effects. Patient says he has plenty of this medication, and isn't interested in changes at this time, but may consider if the cardiologist recommends it.    GERD: Current medications include: Protonix (pantoprazole) 40 mg once daily. Pt c/o no current symptoms.  Patient feels that current regimen is effective.    Thyroid: Current therapy includes levothyroxine 137 mcg daily. Patient is unsure if he is having symptoms or not because of all that is going on right now. Pt was found to  have incidental thyroid nodules during admission. Pt was directed to follow-up with endocrinology, pt has not scheduled follow-up yet.    Pain: Current therapy includes acetaminophen 1000 mg prn for headaches. He knows not to take more than 4000 mg daily. Feels this is effective for about half the day, when he will take another dose. Headaches were present prior to starting isosorbide per his report. No reported side effects.       Current labs include:  BP Readings from Last 3 Encounters:   04/28/17 138/84   04/26/17 139/87   03/31/17 132/80       Lab Results   Component Value Date    A1C 11.7 04/22/2017   .  Lab Results   Component Value Date    CHOL 118 04/21/2017     Lab Results   Component Value Date    TRIG 59 04/21/2017     Lab Results   Component Value Date    HDL 51 04/21/2017     Lab Results   Component Value Date    LDL 55 04/21/2017     Lab Results   Component Value Date    ALT 50 03/04/2014     Last Basic Metabolic Panel:  Lab Results   Component Value Date     04/26/2017      Lab Results   Component Value Date    POTASSIUM 3.8 04/26/2017     Lab Results   Component Value Date    CHLORIDE 106 04/26/2017     Lab Results   Component Value Date    BUN 15 04/26/2017     Lab Results   Component Value Date    CR 0.77 04/26/2017     GFR Estimate   Date Value Ref Range Status   04/26/2017 >90  Non  GFR Calc   >60 mL/min/1.7m2 Final   04/25/2017 >90  Non  GFR Calc   >60 mL/min/1.7m2 Final   04/24/2017 >90  Non  GFR Calc   >60 mL/min/1.7m2 Final     GFR Estimate If Black   Date Value Ref Range Status   04/26/2017 >90   GFR Calc   >60 mL/min/1.7m2 Final   04/25/2017 >90   GFR Calc   >60 mL/min/1.7m2 Final   04/24/2017 >90   GFR Calc   >60 mL/min/1.7m2 Final     TSH   Date Value Ref Range Status   04/21/2017 3.28 0.40 - 4.00 mU/L Final       Most Recent Immunizations   Administered Date(s) Administered      Influenza (H1N1) 01/29/2010     Influenza (IIV3) 11/17/2009     Influenza Vaccine IM 3yrs+ 4 Valent IIV4 11/14/2013     ASSESSMENT:                                                       Current medications were reviewed today.      Medication Adherence: needs improvement - see below    Hypertension: Needs improvement. Pt would likely benefit from increasing amlodipine as directed to 10 mg daily. We discussed how we was receiving amlodipine 5 mg BID in her hospital per chart review, and lisinopril 40 mg is a reasonable dose. Additionally, he may benefit from using a blood pressure monitor at home to help with control/awareness. Additionally, pt would benefit from supply of medication to get him through to cardiology appointment. Discussed he would likely need to get 90-day supplies from cardiology at visit.     CVA/Afib/CAD: Improving. Pt is not currently on anticoagulation due to bleed, however, would likely benefit from reassessment during outpatient visit. Per chart review, patient should hold anticoagulation for four weeks. Patient would likely benefit from long-term anticoagulation after this period based on CHADS2-vasc score. May be a candidate for warfarin therapy, or a DOAC since he has trouble making it in for follow-up. Would benefit from discussion with cardiology during follow-up visit.          Diabetes:  Needs improvement. Fasting blood sugars are not at goal of  mg/dL. Pt has titration from endocrinology and feels comfortable with dosing adjustments.     Hyperlipidemia: Needs Improvement. Pt is not on high intensity statin which is indicated based on 2013 ACC/AHA guidelines for lipid management.      GERD: Stable.  Current treatment is effective.    Thyroid: Needs improvement. Pt has not scheduled with endocrinology regarding nodules per discharge summary.     Pain: Stable.     PLAN:                                                      Post Discharge Medication Reconciliation Status: discharge  medications reconciled, continue medications without change.    1. Pt to make appointment with endocrinology  2. Pt to take amlodipine 5mg BID as directed  3. MTM will discuss refills and directions of medications to get him through to the cardiology appointment (lisinopril, NTG, amlodipine) with PCP  -Ian Cordova gave verbal approval for fills  4. MTM will discuss cholesterol medication with cardiology/Dr. Cordova - recommend rosuvastatin 20 mg daily  5. Pt to ask for 90-day supplies of hypertension/cardiac meds at cardiologist appointment  -90 day supplies of lisinopril, isosorbide, and amlodipine were sent via nursing refill protocol on 5/2  6. MTM will ask cardiology/Dr. Cordova about BP monitor  -Ian Cordova gave verbal approval for BP monitor  7. Pt to follow-up regarding continuation of anticoagulation at cardiology visit, would recommend warfarin therapy or DOAC such as Apixaban 5 mg BID    I spent 45 minutes with this patient today. A copy of the visit note was provided to the patient's primary care provider.    Will follow up in 1 month.  Future consideration:  Review vaccines with patient    The patient was mailed a summary of these recommendations as an after visit summary.    Vivi Morris PharmD  Medication Therapy Management Resident  Pager: [phone]    I agree with the resident note and plan of care.      Mel Olivo PharmD [facility]  Medication Therapy Management Provider  Pager #[phone]    Update on DTP #3 - 6/27/17 Cardiology did not accept recommendation about switching to high intensity statin.  Mary Dee, PharmD IV Student  [employer] [facility] [employer]

## 2017-05-02 DIAGNOSIS — I10 ESSENTIAL HYPERTENSION WITH GOAL BLOOD PRESSURE LESS THAN 140/90: ICD-10-CM

## 2017-05-02 DIAGNOSIS — I25.119 CORONARY ARTERY DISEASE INVOLVING NATIVE CORONARY ARTERY OF NATIVE HEART WITH ANGINA PECTORIS (H): ICD-10-CM

## 2017-05-02 DIAGNOSIS — I42.9 CARDIOMYOPATHY (H): ICD-10-CM

## 2017-05-02 DIAGNOSIS — I10 HYPERTENSION GOAL BP (BLOOD PRESSURE) < 140/90: ICD-10-CM

## 2017-05-02 DIAGNOSIS — I25.10 CORONARY ARTERY DISEASE INVOLVING NATIVE CORONARY ARTERY OF NATIVE HEART WITHOUT ANGINA PECTORIS: ICD-10-CM

## 2017-05-02 RX ORDER — AMLODIPINE BESYLATE 5 MG/1
5 TABLET ORAL 2 TIMES DAILY
Qty: 180 TABLET | Refills: 3 | Status: SHIPPED | OUTPATIENT
Start: 2017-05-02 | End: 2017-06-22

## 2017-05-02 RX ORDER — ISOSORBIDE MONONITRATE 30 MG/1
15 TABLET, EXTENDED RELEASE ORAL DAILY
Qty: 45 TABLET | Refills: 1 | Status: SHIPPED | OUTPATIENT
Start: 2017-05-02 | End: 2017-05-26

## 2017-05-02 RX ORDER — LISINOPRIL 20 MG/1
40 TABLET ORAL AT BEDTIME
Qty: 180 TABLET | Refills: 1 | Status: SHIPPED | OUTPATIENT
Start: 2017-05-02 | End: 2017-05-26

## 2017-05-03 ENCOUNTER — TELEPHONE (OUTPATIENT)
Dept: PHARMACY | Facility: CLINIC | Age: 67
End: 2017-05-03

## 2017-05-03 RX ORDER — NITROGLYCERIN 0.4 MG/1
0.4 TABLET SUBLINGUAL EVERY 5 MIN PRN
Qty: 50 TABLET | Refills: 0 | Status: ON HOLD | OUTPATIENT
Start: 2017-05-03 | End: 2021-04-09

## 2017-05-03 NOTE — TELEPHONE ENCOUNTER
Called pt to follow-up on medication fills after MTM LAURA visit. 90-day supplies were sent by cardiology yesterday for amlodipine, isosorbide, and lisinopril. Amlodipine and lisinopril are ready at the pharmacy. NTG was renewed per verbal order form Ian Cordova as well as BP monitor. BP monitor is not covered by insurance, cash price is $39.99 per pharmacy.     Called pt to discuss medication directions, and prescription statuses. Pt expresses understanding.     Vivi Morris, PharmD  Medication Therapy Management Resident  Pager: [phone]

## 2017-05-05 ENCOUNTER — HOSPITAL ENCOUNTER (OUTPATIENT)
Dept: SPEECH THERAPY | Facility: CLINIC | Age: 67
Setting detail: THERAPIES SERIES
End: 2017-05-05
Attending: HOSPITALIST
Payer: COMMERCIAL

## 2017-05-05 ENCOUNTER — CARE COORDINATION (OUTPATIENT)
Dept: CARE COORDINATION | Facility: CLINIC | Age: 67
End: 2017-05-05

## 2017-05-05 PROCEDURE — 96125 COGNITIVE TEST BY HC PRO: CPT | Mod: GN | Performed by: SPEECH-LANGUAGE PATHOLOGIST

## 2017-05-05 PROCEDURE — 40000211 ZZHC STATISTIC SLP  DEPARTMENT VISIT: Performed by: SPEECH-LANGUAGE PATHOLOGIST

## 2017-05-05 NOTE — PROGRESS NOTES
"Clinic Care Coordination Contact  OUTREACH    Please see writer initial outreach to patient on 04/27/2017.          CCRN spoke briefly with patient today.    He was not easy to engage during today's conversation.     Patient states that he is doing okay but bored at home.   He was seen by PCP on 04/28/2017 and additionally spoke with MTM on 05/01/2017. He denies any medication questions or concerns at this time - feels that this issue is \"straightened out\".    He continues to monitor his BG but it has \"not been as good as I would like\"; this AM reading 201 (fasting).    He continues to use APAP ES - 4 to 6 tabs MAX daily but was able to \"get by yesterday with only a couple.\"   He decided that he is going to wait to get a new pair of glasses (secondary to his post-CVA RIGHT field vision cut) as there is \"no point in spending that money and I want to see if my vision will improve.\"   He denies any weight changes - gain/loss of 2+lb.   He denies any current symptoms to CCRN at this time.   He reports that he placed an order for his cane and paid extra for express shipping, however it has not arrived yet.  He anticipates that it will arrive today, at least based on an email he received from the company he ordered it from.   His wife continues to be very supportive.   He is eager to start his OP Rehab; starting with Speech Therapy today, PT on Monday 05/08/2017 and OT on Monday 05/15/2017.    Referral Information:  Referral Source: CTS  Reason for Contact:  Follow up.   Care Conference: No     Universal Utilization:   ED Visits in last year: 1  Hospital visits in last year: 1  Last PCP appointment: 04/28/17  Missed Appointments:  (N/A)  Concerns: See 04/26/2017 CTS - multiple concerns.   Multiple Providers or Specialists: Yes (See Care Team)    Clinical Concerns:  Clinical Pathway Name: None      Medication Management:  See information above.     Functional Status:  Mobility Status: Independent w/Device  Equipment " Currently Used at Home: walker, rolling, cane, straight ((Cane is on order as of 04/28/2017.))  Transportation:  Relies on community transportation or family for rides.       Psychosocial:  Current living arrangement:: I live in a private home with spouse (Two-level home with 13 stairs/railing.)  Financial/Insurance:  [facility] with Medicare - Part A ONLY.   See thorough initial assessment notes by writer dated 04/27/2017 - no changes noted in current financial situation.      Resources and Interventions:  Current Resources: DME; Other (see comment)  PAS Number:  (N/A)  Senior Linkage Line Referral Placed:  (N/A)  Advanced Care Plans/Directives on file:: No (FULL CODE)  Referrals Placed: MTM, Transportation     Goals:   Goal 1 Statement: I will weigh myself every morning and report an increase of 2+ pounds to my clinic.   Goal 1 Progression Percent: 80%  Goal 1 Progression Date: 05/05/17    Goal 2 Statement: I will take my medications as directed.   Goal 2 Progression Percent: 100%  Goal 2 Progression Date: 05/05/17    Goal 3 Statement: I will monitor my blood glucose levels 3-4x daily, as directed.   Goal 3 Progression Percent: 100%  Goal 3 Progression Date: 05/05/17     Frequency of Care Coordination: Q 1-2 weeks  Upcoming appointment: 05/05/17 (Speech Therapy )        Plan:  CCRN will outreach to patient in 2 weeks and will remain available to patient in that timeframe should he need anything.       Patient/Caregiver understanding: Patient agreed with plan.     Abril Dawson, KRYSTAL  [facility]  Clinic Care Coordinator - [place] and [place] Locations   Direct:  [phone] (voicemail available)

## 2017-05-09 NOTE — PROGRESS NOTES
"   [facility] OP SLP EVALUATION  05/05/17 1500      Comments [facility] OP SLP Evaluation   General Information   Type of Evaluation Cognitive-Linguistic   Type Of Visit Initial   Start Of Care Date 05/05/17   Orders Evaluate And Treat   Medical Diagnosis CVA, dysarthria   Onset Of Illness/injury Or Date Of Surgery 04/26/17   Hearing WFL   Surgical/Medical history reviewed Yes   Pertinent History Of Current Problem Mr. Elder is a markedly pleasant 66 year old gentleman with CAD, Type 2 Diabetes mellitus, hypertension, dyslipidemia, chronic bilateral foot ulcers and hypothyroid who was admitted 4/21/2017 for vision changes and found to have subacute left occipital stroke causing hemorrhagic transformation and new atrial fibrillation. He was discharged from [facility] on 4/26 for left occipital stroke and atrial fibrillation. His past medical history is significant for CAD, HTN, DM2.  Mr. Elder is here today for an outpatient speech-language evaluation due to changes with memory and short-term recall and occasional word-finding difficulties per patient report.  When patient was in the hospital SLP indicated cognitive-linguistic impairments since his CVA. PT also reports R visual field cut, thus missing things on his right side.   Prior Level Of Function Comment Patient was independent in all daily living activities including driving prior to his CVA.   Current Community Support  Family/friend caregiver   Patient Role/employment History Retired   Living environment [place]   General Observations Patient is pleasant and cooperative   Patient/family Goals Patient would like to \"remember what I am being told.\"   FALL RISK SCREEN   Comments Patient is scheduled for a PT evaluation next week.   Pain Assessment   Pain Reported No   Oral Motor Sensory Function   Functional Assessment Scale (Oral Motor) No Impairment   Comments Currently tolerating a regular diet with thin liquids without difficulty. " "  Language: Auditory Comprehension (understanding of spoken language)   Comments (Auditory Comprehension) Not completed today d/t time constraints will further assess in dx assessment as indicated.    Language: Verbal Expression (use of spoken language to express information)   Comments (Verbal Expression) Not completed today d/t time constraints will further assess in dx assessment as indicated.    Reading Comprehension (understanding of written language)   Comments (Reading Comprehension) Not completed today d/t time constraints will further assess in dx assessment as indicated. However, decreased vision will likely affect his performance as it did while in hospital.   Written Expression (use of writing to express information)   Comments (Written Expression) Not completed today d/t time constraints will further assess in dx assessment as indicated.    Pragmatics (the social or functional use of a language)   Deficits noted in Nonverbal None   Deficits noted in Conversational Skills None   Deficits noted in the Use of Linguistic Context None   Deficits noted in the Organization of Narrative; RICE None   Functional Assessment Scale  (Pragmatics) No Impairment   Cognitive Status Examination   Attention impaired   Behavioral Observations WFL   Visual Impairments Include localization of objects in the visual field  (reports R field cut and \"floaters\" when looking at objects)   Short Term Memory impaired   Long Term Memory impaired   Reasoning impaired   Standardized cognitive-linguistic assessment completed yes;please see separate report for results;CLQT;Eric-Chris   Education Assessment   Barriers to Learning Visual   Preferred Learning Style Listening;Reading;Demonstration;Pictures/video   General Therapy Interventions   Planned Therapy Interventions Cognitive Treatment   Cognitive treatment Internal memory strategy training;External memory strategy training;Progressive attention training   Clinical Impression, " SLP Eval   Criteria for Skilled Therapeutic Interventions Met yes;treatment indicated   SLP Diagnosis Mild-moderate cognitive-linguistic impairments   Therapy Frequency 2 times;per week   Predicted Duration of Therapy Intervention (days/wks) 12 weeks   Risks and Benefits of Treatment have been explained. Yes   Patient, Family & other staff in agreement with plan of care Yes   Clinical Impression Comments Mr. Elder presents with mild-moderate cognitive-linguistic impairments in the areas of attention, reasoning and memory.  His language skills are not formally assessed during today's evaluation d/t time constraints but will further assess in dx assessment due to patient reports of impairments in those areas. Skilled outpatient speech-language therapy is indicated due to impairments in these cognitive-linguistic areas and he was able to complete independently prior to his CVA.     Language/Cognition Goal 1   Goal Identifier LTG   Goal Description Patient will improve his cognitive-linguistic skills for communication of daily wants and needs with use of compensatory strategies as needed and at least 90%.   Target Date 12/05/17   Language/Cognition Goal 2   Goal Identifier STG 1: Cognition/Attention   Goal Description Patient will be able to shift his attention between therapy tasks and alternate his attention with less than 2x redirection and at least 85% accuracy with moderate cues for improved attention needed for daily living communication and safety needs.   Target Date 08/05/17   Language/Cognition Goal 3   Goal Identifier STG 2: Cognition/Memory   Goal Description Patient will implement trained memory strategies for improved recall of verbal and nonverbal memory tasks at the moderate level with 85% and minimal assistance needed for daily living communication needs.    Target Date 08/05/17   Language/Cognition Goal 4   Goal Identifier STG 3: Cognition/Reasoning   Goal Description Patient will complete moderate  level verbal and nonverbal reasoning tasks including logic, deduction, sequencing and practical problem solving needed for daily living and safety needs with 85% and moderate assistance.    Target Date 08/05/17   Language/Cognition Goal 5   Goal Identifier Further language goals will be included pending results of dx assessment.   Total Session Time   Total Evaluation Time 60 min    Thank you for the referral of this patient.  If you have any questions regarding the information in this report, please feel free to contact me at [phone].     Vivian Plunkett MA, CCC-SLP  Speech-Language Pathologist  [address] [address]  Fax:  [phone]

## 2017-05-09 NOTE — PROGRESS NOTES
Speech Language Pathology     Eric-Chris III Test of Cognitive Abilities (WJ-III)    DATE OF TEST: 5/5/17  SUMMARY OF TEST:    The WJ-III is an extensive test that may be given in its entirety or within clusters that may include one or more subtests .   The RPI score refers to how successful the patient was completing tasks that average peers would have completed with 90% success.    Verbal Ability measures language development that includes  comprehension of single words and relationships among words.  Subtests include Verbal comprehension (Test 1).  The extended scale also includes General Information (Test 11).    Thinking Ability measures different thinking processes when short-term memory cannot be processed.  It is tested through Visual-Auditory Learning (Test 2), Spatial Relations (Test 3), sound Blending (Test 4), and Concept Formation (Test 5).  The extended scale also includes Retrieval Fluency (Test 12), Picture Recognition (Test 13), Auditory Attention (Test 14), and Analysis-synthesis (Test 15).    Cognitive Efficiency measures two areas of automatic cognitive processes:  processing speed and short-term memory.  It is tested through Visual Matching (Test 6) and Numbers Reversed (Test 7).  The extended scale also includes Decision speed (Test 16) and Memory for Words (Test 17).    Comprehension-Knowledge includes the breadth and depth of a person s acquired knowledge, the ability to communicate that knowledge, and the ability to reason using previously learned experiences/procedures.  It is tested through Verbal Comprehension (Test 1) and General Information (Test 11).    Long-Term Retrieval is the ability to store information efficiently to be able to recall the information later.  Subtests include Visual-Auditory Learning (Test 2) and Retrieval Fluency (Test 12).    Visual-Spatial Thinking is the ability to perceive, analyze, synthesize, and think with visual patterns.  This includes the ability  to store and recall visual representations.  Subtests include Spatial Relations (Test 3) and Picture Recognition (Test 13).   Auditory Processing is the ability to analyze, synthesize, and discriminate auditory stimuli.  Subtests include Sound Blending (Test 4) and Auditory Attention (Test 14).    Fluid Reasoning is the ability to reason, form concepts and solve problems with unfamiliar information or procedures.  Subtests include Concept Formation (Test 5) and Analysis-Synthesis (Test 15).    Processing Speed is the ability to perform automatic cognitive tasks.  Subtests include Visual Matching (Test 6) and Decision Speed (Test 16).      Short-Term Memory is the ability to hold information in immediate awareness to use it within the next few seconds.  Subtests include Numbers Reversed (Test 7) and Memory for Words (Test 17).    Phonemic Awareness includes knowledge and skills related to analyzing and synthesizing speech sounds.  Subtests include Sound Blending (Test 4) and Incomplete Words (Test 8).    Working Memory is the ability to hold information in immediate awareness while performing a mental operation on the information.  Subtests include Numbers Reversed (Test 7) and Auditory Working Memory (Test 9).    Broad Attention includes focused or selective attention, vigilance or sustained attention, divided attention and working memory.   Subtests include Numbers Reversed (Test 7), Auditory Working Memory (Test 9), Auditory Attention (Test 14) and Pair Cancellation (Test 20).    Cognitive Fluency measures the ease and speed by which an individual performs cognitive tasks.  Subtests include Retrieval Fluency (Test 12), Decision speed (Test 16) and Rapid Picture naming (Test 18).    Executive Processes includes strategic planning, proactive interference control and the ability to shift repeatedly one s mental status.  It is tested through Concept Formation (Test 5), Planning (Test 19), and Pair Cancellation (Test  20).    Delayed Recall measures the ability to both recall and relearn associations that were previously learned.  Subtests include Visual-Auditory Learning - Delayed (Test 10) and Story Recall - Delayed (Test 12).  Knowledge measures general information and curricular knowledge.  Subtests include General Information (Test 11) and Academic Knowledge (Test 19).      RESULTS OF TESTING:  TABLE OF SCORES  Eric-Chris III Tests of Cognitive Abilities  [employer] for the WJ III, Version 2.0  Norms based on age 66    CLUSTER/Test Raw AE EASY to DIFF RPI LA SS(68% BAND) GE    Visual-Auditory Learning  27-E   7-1    5-7    9-8   89/90  49   100 ()   1.6   Analysis-Synthesis  18-B   9-8    8-0   12-11  91/90  53   101 ()   4.4      INTERPRETATION OF TEST RESULTS: Mild-moderate impairments with analysis-synthesis/complex reasoning, mild impairment with memory.    TIME ADMINISTERING TEST: 30  TIME FOR INTERPRETATION AND PREPARATION OF REPORT: 45  TOTAL TIME: 75  Reference:  Eric, Richie and Ovid, The Eric-Chris Tests of Cognitive Abilities - III  (WJ-III) (2001, 2007a).  Ellie Del Rio.  (this test is available in a fourth edition).

## 2017-05-09 NOTE — PROGRESS NOTES
Speech Language Pathology     Cognitive Linguistic Quick Test (CLQT)    SUMMARY OF TEST:  5/5/17  The CLQT assesses visual attention and perception, working memory and language output skills, as well as auditory memory and comprehension.  Non-linguistic tasks can help assess planning, and self-monitoring, visual discrimination and analysis, as well as creativity and mental flexibility.   Together, these subtests assess the cognitive domains of attention, memory, executive function, language, and visuospatial skills using  a severity rating of either WNL (within normal limits), Mild, Moderate or Severe.      RESULTS OF TESTING:   Attention    Score: 56    Severity Rating: Moderate     Memory    Score: 165    Severity Rating: Mild      Executive Functions    Score: 16    Severity Rating: Moderate     Language    Score: 32    Severity Rating: WNL      Composite Severity Rating    Score: 2.6    Severity Rating: Mild  and Moderate  INTERPRETATION OF TEST RESULTS: Mild to moderate impairments with attention, executive function and visual-spatial reasoning.  Therapy indicated    TIME ADMINISTERING TEST: 40  TIME FOR INTERPRETATION AND PREPARATION OF REPORT: 45  TOTAL TIME: 85

## 2017-05-09 NOTE — ADDENDUM NOTE
Encounter addended by: Vivian Plunkett, SLP on: 5/9/2017 12:41 PM<BR>     Actions taken: Flowsheet accepted, Sign clinical note

## 2017-05-10 ENCOUNTER — HOSPITAL ENCOUNTER (OUTPATIENT)
Dept: PHYSICAL THERAPY | Facility: CLINIC | Age: 67
Setting detail: THERAPIES SERIES
End: 2017-05-10
Attending: HOSPITALIST
Payer: COMMERCIAL

## 2017-05-10 PROCEDURE — 97116 GAIT TRAINING THERAPY: CPT | Mod: GP | Performed by: PHYSICAL THERAPIST

## 2017-05-10 PROCEDURE — 97162 PT EVAL MOD COMPLEX 30 MIN: CPT | Mod: GP | Performed by: PHYSICAL THERAPIST

## 2017-05-10 PROCEDURE — 40000719 ZZHC STATISTIC PT DEPARTMENT NEURO VISIT: Performed by: PHYSICAL THERAPIST

## 2017-05-11 NOTE — PROGRESS NOTES
05/10/17 1500   Quick Adds   Type of Visit Initial OP PT Evaluation   General Information   Start of Care Date 05/10/17   Referring Physician Froilan Benítez   Orders Evaluate and Treat as Indicated   Order Date 04/26/17   Medical Diagnosis Subacute left occipital stroke, likely cardioembolic, with hemorrhagic transformation   Onset of illness/injury or Date of Surgery 04/21/17   Precautions/Limitations (reports 5# lifetime lifting restriction from past spine surg)   Surgical/Medical history reviewed Yes   Pertinent history of current problem (include personal factors and/or comorbidities that impact the POC) Pt is a pleasant gentleman who began experiencing visual changes and fatigue when visiting family in [place].  Family drove him home to [place] and immediately brought him to ED.  Pt found to have cardioembolic CVA with hemorraghic conversion in L occipital lobe.  Pt was in hospital for 6 days and then discharged home on 4/26/17 with orders for outpatient PT, OT and SLP.  Pt has PMH significant for 3 spine surgeries (remote but still has pain), diabetic neuropathy with foot sores at 1st MTPs bilaterally, but left foot is open sore and slow to heal.  Pt reports his issues with visual field cut and flashing lights/images is his most limiting symptom at present.     Pertinent Visual History  Right neglect with flashing lights and images in visual field.     Prior level of function comment Independent with all function, driving.  Took care of grandchildren often ages between 4 months and 9 years old.  Pt prepared dinner meal for him and his wife each night, did all grocery shopping.  ENjoys fishing.  managed all house repairs and yard work.  Enjoys going garage sales with wife in the summer.     Current Community Support Family/friend caregiver  (spouse.  Has children in area)   Patient role/Employment history Retired  (airline luggage handler; parts organizer.  )   Living [facility]  "  Home/Community Accessibility Comments 1.5 steps to enter , no rail.  full flight of stairs to bedroom with railing.  Does not go  to basement often.     Current Assistive Devices Standard Cane;Front Wheeled Walker   Assistive Devices Comments uses cane primarily when going out of home.  Does not use cane much in home.     Patient/Family Goals Statement \"get as close to normal as I can\"  Specifically - wants to walk without a cane, return to driving, return to independence with cooking, yardwork ,fishing, grocery shopping, caring for grandchildren.     Fall Risk Screen   Fall screen completed by PT   Per patient - Fall 2 or more times in past year? Yes  (prior to stroke had falls occasionally)   Per patient - Fall with injury in past year? No   Timed Up and Go score (seconds) NT   Is patient a fall risk? Yes   Fall screen comments Low risk according to Alfaro but will consider greater risk due to visual impairments   Pain   Patient currently in pain No   Pain comments Does have occasional issues with back pain (chronic) and bilateral feet due to neuropathy.     Cognitive Status Examination   Orientation orientation to person, place and time   Level of Consciousness alert   Follows Commands and Answers Questions 100% of the time;able to follow multistep instructions   Personal Safety and Judgment intact   Memory (see OT.  Pt reports decrease STM)   Cognitive Comment see OT/SLP   Integumentary   Integumentary Comments intact to visible areas but pt reports closed wound on right 1st MTP (plantar) and open non-healing sore on left 1st MTP (plantar)   Posture   Posture Forward head position;Protracted shoulders   Range of Motion (ROM)   ROM Comment decreased length bilateral HC and HS   Strength   Strength Comments bilat hip flex 4-/5.  right knee flex/ext 4/5,  left knee flex/ext 4-/5.  right ankle DF 4/5,  left ankle DF 4-/5.  (h/o left ankle injuries in the past - left side actually slighely weaker than right.  right " side also dominant side).     Transfer Skills   Transfer Comments able to rise to stand without UE support,  harder to control descent without UE support.    Gait   Gait Comments Pt ambulates without AD with slow pace, decrased arm swing right more than left.  Decreased heelstrike at initial contact, mild deviation from path likely due to decreased right visual field.    Stairs:  ascends sideways in step to pattern up with right holding rail on left with both sides.  Down using same side rail leadin down with left sideways as well.     Gait Special Tests 25 Foot Timed Walk   Seconds 14.6   Steps 19 Steps   Comments no AD   Balance Special Tests Alfaro Balance   Score out of 56 46/56   Comments improved from 40 / 56 during inpatient hospitalization.   Sensory Examination   Sensory Perception Comments numbness ,tingling, pain bilateral distal LE and feet due to diabetic neuropathy.     Muscle Tone   Muscle Tone no deficits were identified   Modality Interventions   Planned Modality Interventions Comments per PT discretion   Planned Therapy Interventions   Planned Therapy Interventions balance training;gait training;neuromuscular re-education;strengthening;stretching;transfer training;manual therapy   Clinical Impression   Criteria for Skilled Therapeutic Interventions Met yes, treatment indicated   PT Diagnosis inability to participate in life roles and tasks due to right visual field cut, balance and gait impairments.     Influenced by the following impairments general deconditioning, decreaed proximal strength, sensory impairments, visual impairments    Functional limitations due to impairments decreased gait speed, need for AD with gait, impaired balance, compensatory strategies for vision, stairs.    Clinical Presentation Evolving/Changing   Clinical Presentation Rationale Recent stroke - still healing/recovering.  Comorbidities of significant back surgeries, diabetic neuropathy with wound healing issues.  Has  support of wife and intermittent assistance of adult children   Clinical Decision Making (Complexity) Moderate complexity   Therapy Frequency 2 times/Week   Predicted Duration of Therapy Intervention (days/wks) for 6 weeks, then 1 x per week x 8 weeks.     Risk & Benefits of therapy have been explained Yes   Patient, Family & other staff in agreement with plan of care Yes   Education Assessment   Barriers to Learning Visual  (memory)   Goal 1   Goal Identifier 1   Goal Description Jayro will ambulate 25 feet in 7 seconds or less to indicate reducd level of impairment and greater gait efficiency for home and community ambulation safety.   Target Date 08/08/17   Goal 2   Goal Identifier 2   Goal Description Jayro will be able to ambulate community distances (1000 feet or greater) without a cane to return to grocery shopping tasks, leisure activities and caring for grandkids safely.   Target Date 08/08/17   Goal 3   Goal Identifier 3   Goal Description Jayro will be able to negotiate 1 flight of stairs with support of 1 rail in reciprocal pattern for greater ease and safety with accessing all levels of his home.     Target Date 08/08/17   Goal 4   Goal Identifier 4   Goal Description Jayro will be independent in a home ex and activity program to address and manage his impairments and functional limitations.    Target Date 08/08/17   Total Evaluation Time   Total Evaluation Time (Minutes) 42 (+ 13 min treatment)

## 2017-05-12 DIAGNOSIS — I25.10 CORONARY ARTERY DISEASE INVOLVING NATIVE CORONARY ARTERY OF NATIVE HEART WITHOUT ANGINA PECTORIS: ICD-10-CM

## 2017-05-12 RX ORDER — METOPROLOL SUCCINATE 100 MG/1
100 TABLET, EXTENDED RELEASE ORAL AT BEDTIME
Qty: 90 TABLET | Refills: 3 | Status: SHIPPED | OUTPATIENT
Start: 2017-05-12 | End: 2018-05-24

## 2017-05-12 RX ORDER — SIMVASTATIN 40 MG
40 TABLET ORAL AT BEDTIME
Qty: 90 TABLET | Refills: 3 | Status: SHIPPED | OUTPATIENT
Start: 2017-05-12 | End: 2018-05-24

## 2017-05-15 ENCOUNTER — HOSPITAL ENCOUNTER (OUTPATIENT)
Dept: OCCUPATIONAL THERAPY | Facility: CLINIC | Age: 67
Setting detail: THERAPIES SERIES
End: 2017-05-15
Attending: HOSPITALIST
Payer: COMMERCIAL

## 2017-05-15 PROCEDURE — 97535 SELF CARE MNGMENT TRAINING: CPT | Mod: GO | Performed by: OCCUPATIONAL THERAPIST

## 2017-05-15 PROCEDURE — 40000125 ZZHC STATISTIC OT OUTPT VISIT: Performed by: OCCUPATIONAL THERAPIST

## 2017-05-15 PROCEDURE — 97166 OT EVAL MOD COMPLEX 45 MIN: CPT | Mod: GO | Performed by: OCCUPATIONAL THERAPIST

## 2017-05-15 NOTE — PROGRESS NOTES
05/15/17 1300   Quick Adds   Type of Visit Initial Outpatient Occupational Therapy Evaluation   General Information   Start Of Care Date 05/15/17   Referring Physician Froilan Benítez MD   Orders Evaluate and treat as indicated  (stroke and vision loss)   Orders Date 04/26/17   Medical Diagnosis Cerebrovascular accident (CVA) due to embolism of left posterior cerebral artery   Onset of Illness/Injury or Date of Surgery 04/21/17   Precautions/Limitations Fall precautions   Special Instructions per physical therapy evaluation, patient reports 5# lifetime lifting restriction from past spine surgeries   Surgical/Medical History Reviewed Yes   Additional Occupational Profile Info/Pertinent History of Current Problem Patient started experiencing visual changes and fatigue when visiting family in [place]. Family drove him home to [place] and immediately brought him to ED on 4/21/17. Pt found to have cardioembolic CVA with hemorraghic conversion in L occipital lobe; has new Afib. Pt was discharged home on 4/26/17 with orders for outpatient PT, OT and SLP. Pt has PMH significant for 3 spine surgeries (remote but still has pain), diabetic neuropathy with foot sores at 1st MTPs bilaterally, but left foot is open sore and slow to heal. Other PMH includes: CAD, Type 2 Diabetes mellitus, hypertension, dyslipidemia.    Comments/Observations Patient reports his neurologist is Dr. Berry.  He said he was seen by Dr. Berry's assistant, Gloria last week and she said he had a stroke in R brain and in L brain.  Patient reports his issues with R visual field cut and flashing lights/images is his most limiting symptom at present.   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  Has a HS education.  Likes to go to [employer] sales, fishing. Prior to stroke, he watched grandkids at times when needed (ages 2 to 9)   Current Living Environment House  (2 story with basement)   Primary Bathroom Set Up/Equipment Tub;Shower stall  (no AE, shower is small - able to brace self)   Home/Community Accessibility Comments reports his wife won't let him use the stairs when he is home alone, he stays primarily on the main level during the day.   Prior Level - Transfers Independent   Prior Level - Ambulation Independent   Prior Level - ADLS Independent   Prior Responsibilities - IADL Meal Preparation;Laundry;Shopping;Yardwork;Medication management;Driving   Prior Level Comments patient did majority of cooking (wife still works), spouse does majority of cleaning and finances.  Hires snow removal.   Current Assistive Devices - Mobility Standard cane  (mostly for out of the home, once in a while in home)   Role/Living Environment Comments Patient has not resumed much of household tasks yet.  Has good judgment that shouldn't drive with visual deficits.   Patient/family Goals Statement \"Get as close to normal as I can.\" Improve vision, drive, go fishing again, watch grandkids again.   Pain   Patient currently in pain Yes   Pain location slight H/A - has had since the stroke, tries not take medication   Pain rating 2   Pain comments feels noises and bright lights make H/A's worse   Fall Risk Screen   Fall screen completed by PT   Have you fallen 2 or more times in the last year? Yes   Have you fallen and had an injury in the past year? No  (did break his glasses when fell on face/tripped on dog)   Is the patient a fall risk? Yes   System Outcome Measures   Outcome Measures AM-PAC   AM-PAC  Basic Mobility Score Level  (Lower scores equate to lower levels of function) 55.09   AM-PAC  Daily Activity Score Level  (Lower scores equate to lower levels of " "function) 42.76   AM-PAC  Applied Cognitive Score Level  (Lower scores equate to lower levels of function) 33.57   Cognitive Status Examination   Orientation Person;Place  (oriented to year, not month (April))   Level of Consciousness Alert   Follows Commands and Answers Questions 100% of the time;Able to follow single-step instructions   Personal Safety and Judgment At risk behaviors demonstrated;Other (see comments)  (4/4 safety questions correct; knows 911; memory deficits)   Memory Impaired  (4 of 5 recall after 2 minutes)   Memory Comments Feels he is not at his baseline in regards to memory and concentration   Cognitive Comment Short Blessed Test: 9/28 - moderate deficits with orientation, concentration and memory skills.  Will continue to address and further assess additional areas as relates to function.   Visual Perception   Visual Perception Wears glasses  (progressive bifocals)   Visual Acuity will assess - pateint c/o blurry vision since CVA   Visual Field feels he sees shadows in R - see below; apparent R visual field cut per the followng measurements of temporal horizontal visual field uising the Periomter: R eye temporal: inconsistent (likely having phantom vision): he was fairly consistent at seeing the target at ~0-20 degrees/BNLs, nasal: 55 degrees/WNLS; L eye: temporal: 80 degrees/WNLS and nasal: again inconsistent and seeing possible shadows but not consistent, seeing target at ~0-25 degrees   Oculomotor Smooth pursuits: min difficulty keeping attention in superior visual fields; reports initially had double vision but not much anymore   Visual Perception Comments In R visual field, reports seeing figures, people, road signs, flashing lights/blinking - seeing less of this more recently   Sensation   Sensation Comments no numbness/tingling   Range of Motion (ROM)   ROM Quick Adds No deficits identified   ROM Comments repaired L shoulder about 15 years ago- \"didn't work\", didn't have therapy "   Strength   Strength Comments 4+/5 B shoulders and elbows   Hand Strength   Hand Dominance Right   Left Hand  (pounds) 78 pounds   Right Hand  (pounds) 58 pounds   Left Lateral Pinch (pounds) 11 pounds  (reports arthritis in L thumb interferes)   Right Lateral Pinch (pounds) 17 pounds   Left Three Point Pinch (pounds) 10 pounds  (reports arthritis in L thumb interferes)   Right Three Point Pinch (pounds) 23 pounds   Hand Strength Comments L 1st digit arthritis - has trouble clipping fingernails with L hand - had trouble with this prior to stroke; reports no new concerns   Coordination   Left Hand, [place] Peg Test (seconds) 32   Right Hand, [place] Peg Test (seconds) 33   Coordination Comments no concerns   Functional Mobility   Functional Mobility Comments Modified independent with SEC   Bathing   Bathing Comments braces himself on shower walls if needed   Upper Body Dressing   Level of [place]: Dress Upper Body independent   Lower Body Dressing   Level of [place]: Dress Lower Body independent   Toileting   Level of [place]: Toilet independent   Grooming   Level of [place]: Grooming independent   Eating/Self-Feeding   Level of [place]: Eating independent   Activity Tolerance   Activity Tolerance unknown   Planned Therapy Interventions   Planned Therapy Interventions ADL training;IADL training;Self care/Home management;Therapeutic activities;Visual perception   Adult OT Eval Goals   OT Eval Goals (Adult) 1;2;3;4   OT Goal 1   Goal Identifier visual scanning skills   Goal Description Patient to demonstrate improved attention to R visual field using compensatory strategies prn as evidenced by at least 90% accuracy with identifying targets/information in the R visual field during visual scanning tasks (both pen/paper and extra personal space) for increased ADL/IADL independence (homemaking tasks, safe navigation, reading, meal prep).   Target Date 08/07/17   OT Goal 2   Goal  Identifier visual reaction time   Goal Description Patient will demonstrate WFLs  visual reaction time and divided attention for safe independent community mobility (crossing the street, driving, grocery shopping) by scoring WNLs on all modes of the Dyn[employer]ision.   Target Date 08/07/17   OT Goal 3   Goal Identifier meal prep   Goal Description Patient to complete a moderately complex meal prep task using the stove and/or oven with modified independence, for increased safety and independence in the home.   Target Date 08/07/17   OT Goal 4   Goal Identifier problem solving skills   Goal Description Patient will demonstrate the ability to complete moderately complex tasks requiring numerical reasoning and problem solving skills with 90% accuracy to complete home and community tasks accurately and safety (grocery shopping, watching grandkids, managing diabetes, managing household repairs), for maximum independence to function at or near baseline at home and in community.   Target Date 08/07/17   Clinical Impression   Criteria for Skilled Therapeutic Interventions Met Yes, treatment indicated   OT Diagnosis decreased ADL/IADL independence   Influenced by the following impairments visual deficits, decreased functional mobility, decreased cognition   Assessment of Occupational Performance 3-5 Performance Deficits   Identified Performance Deficits decreased ability to cook, drive, do household tasks, watch grandkids, ambulate/navigate safely and independently   Clinical Decision Making (Complexity) Moderate complexity   Therapy Frequency 2x/week   Predicted Duration of Therapy Intervention (days/wks) 12 weeks   Risks and Benefits of Treatment have been explained. Yes   Patient, Family & other staff in agreement with plan of care Yes   Education Assessment   Barriers To Learning Visual;Physical;Cognitive   Preferred Learning Style Listening;Demonstration   Total Evaluation Time   Total Evaluation Time 45

## 2017-05-17 ENCOUNTER — TELEPHONE (OUTPATIENT)
Dept: FAMILY MEDICINE | Facility: CLINIC | Age: 67
End: 2017-05-17

## 2017-05-17 ENCOUNTER — HOSPITAL ENCOUNTER (OUTPATIENT)
Dept: SPEECH THERAPY | Facility: CLINIC | Age: 67
Setting detail: THERAPIES SERIES
End: 2017-05-17
Attending: HOSPITALIST
Payer: COMMERCIAL

## 2017-05-17 PROCEDURE — 40000211 ZZHC STATISTIC SLP  DEPARTMENT VISIT: Performed by: SPEECH-LANGUAGE PATHOLOGIST

## 2017-05-17 PROCEDURE — 97532 ZZHC SP COGNITIVE SKILLS EA 15 MIN: CPT | Mod: GN | Performed by: SPEECH-LANGUAGE PATHOLOGIST

## 2017-05-17 NOTE — TELEPHONE ENCOUNTER
"Pt called stating he is having difficulty with rides to his rehab, thought they were supposed to be back to back on the same day.  Upset with the how these appt are scheduled.  Spoke with pt told him he has 2 options,m could always do in home, declined right away but does not want to be considered \"home bound\" and do nothing, so writer contacted [facility] and they will call pt to set up his appt back to back starting June 7th for rehab late in the afternoon.  Pt will attend [facility] in the meantime.  Cherelle Sam RN    "

## 2017-05-19 ENCOUNTER — HOSPITAL ENCOUNTER (OUTPATIENT)
Dept: SPEECH THERAPY | Facility: CLINIC | Age: 67
Setting detail: THERAPIES SERIES
End: 2017-05-19
Attending: HOSPITALIST
Payer: COMMERCIAL

## 2017-05-19 PROCEDURE — 40000211 ZZHC STATISTIC SLP  DEPARTMENT VISIT: Performed by: SPEECH-LANGUAGE PATHOLOGIST

## 2017-05-19 PROCEDURE — 97532 ZZHC SP COGNITIVE SKILLS EA 15 MIN: CPT | Mod: GN | Performed by: SPEECH-LANGUAGE PATHOLOGIST

## 2017-05-22 ENCOUNTER — TRANSFERRED RECORDS (OUTPATIENT)
Dept: HEALTH INFORMATION MANAGEMENT | Facility: CLINIC | Age: 67
End: 2017-05-22

## 2017-05-23 ENCOUNTER — HOSPITAL ENCOUNTER (OUTPATIENT)
Dept: SPEECH THERAPY | Facility: CLINIC | Age: 67
Setting detail: THERAPIES SERIES
End: 2017-05-23
Attending: HOSPITALIST
Payer: COMMERCIAL

## 2017-05-23 ENCOUNTER — PRE VISIT (OUTPATIENT)
Dept: CARDIOLOGY | Facility: CLINIC | Age: 67
End: 2017-05-23

## 2017-05-23 PROCEDURE — 40000211 ZZHC STATISTIC SLP  DEPARTMENT VISIT: Performed by: SPEECH-LANGUAGE PATHOLOGIST

## 2017-05-23 PROCEDURE — 97532 ZZHC SP COGNITIVE SKILLS EA 15 MIN: CPT | Mod: GN | Performed by: SPEECH-LANGUAGE PATHOLOGIST

## 2017-05-25 ENCOUNTER — HOSPITAL ENCOUNTER (OUTPATIENT)
Dept: SPEECH THERAPY | Facility: CLINIC | Age: 67
Setting detail: THERAPIES SERIES
End: 2017-05-25
Attending: HOSPITALIST
Payer: COMMERCIAL

## 2017-05-25 ENCOUNTER — CARE COORDINATION (OUTPATIENT)
Dept: CARE COORDINATION | Facility: CLINIC | Age: 67
End: 2017-05-25

## 2017-05-25 PROCEDURE — 40000211 ZZHC STATISTIC SLP  DEPARTMENT VISIT: Performed by: SPEECH-LANGUAGE PATHOLOGIST

## 2017-05-25 PROCEDURE — 97532 ZZHC SP COGNITIVE SKILLS EA 15 MIN: CPT | Mod: GN | Performed by: SPEECH-LANGUAGE PATHOLOGIST

## 2017-05-25 NOTE — PROGRESS NOTES
Clinic Care Coordination Contact    Situation: Patient chart reviewed by care coordinator.    Background:   Please refer to 04/27/2017 CCRN entry for Initial Assessment of patient/needs.       Assessment:   Patient has been attending OP Therapy appointments as requested.     He has multiple future appointments in place including:  TODAY 05/25/2017-   SLP  Friday 05/26/2017- Cardiologist F/U appt - [facility] and US Thyroid appointments  Wednesday 05/31/2017- PT   Thursday 06/01/2017 - SLP and PT appointments  Friday 06/02/2017- SLP     Additional future appointments scheduled with OP Rehab for the month of June 2017 and beyond.         Clinic is closed in observance of Memorial Day on Monday 05/29/2017.   CCRN out of the office for 1/2 day of STO on 06/01/2017 and again STO 06/08-06/13/2017.      Plan/Recommendations:  [facility] Care Plan mailed to patient 04/28/2017.  Patient has CCRN contact information for outreach.  C[facility]N will outreach to patient in 2-4 weeks for follow up and remain available to patient during that timeframe should he need anything.     Abril Dawson, KRYSTAL  [facility]  Clinic Care Coordinator - [place] and [place] Locations   Direct:  [phone] (voicemail available)

## 2017-05-26 ENCOUNTER — TRANSFERRED RECORDS (OUTPATIENT)
Dept: HEALTH INFORMATION MANAGEMENT | Facility: CLINIC | Age: 67
End: 2017-05-26

## 2017-05-26 ENCOUNTER — HOSPITAL ENCOUNTER (OUTPATIENT)
Dept: ULTRASOUND IMAGING | Facility: CLINIC | Age: 67
Discharge: HOME | End: 2017-05-26
Attending: SPECIALIST | Admitting: SPECIALIST
Payer: COMMERCIAL

## 2017-05-26 ENCOUNTER — OFFICE VISIT (OUTPATIENT)
Dept: CARDIOLOGY | Facility: CLINIC | Age: 67
End: 2017-05-26
Attending: INTERNAL MEDICINE
Payer: COMMERCIAL

## 2017-05-26 VITALS
WEIGHT: 229.4 LBS | HEIGHT: 72 IN | HEART RATE: 60 BPM | DIASTOLIC BLOOD PRESSURE: 68 MMHG | SYSTOLIC BLOOD PRESSURE: 108 MMHG | BODY MASS INDEX: 31.07 KG/M2

## 2017-05-26 DIAGNOSIS — I25.119 CORONARY ARTERY DISEASE INVOLVING NATIVE CORONARY ARTERY OF NATIVE HEART WITH ANGINA PECTORIS (H): ICD-10-CM

## 2017-05-26 DIAGNOSIS — F17.200 TOBACCO USE DISORDER: ICD-10-CM

## 2017-05-26 DIAGNOSIS — I10 ESSENTIAL HYPERTENSION WITH GOAL BLOOD PRESSURE LESS THAN 140/90: ICD-10-CM

## 2017-05-26 DIAGNOSIS — I10 HYPERTENSION GOAL BP (BLOOD PRESSURE) < 140/90: ICD-10-CM

## 2017-05-26 DIAGNOSIS — E04.2 MULTINODULAR GOITER: ICD-10-CM

## 2017-05-26 DIAGNOSIS — E78.2 MIXED HYPERLIPIDEMIA: ICD-10-CM

## 2017-05-26 PROCEDURE — 93000 ELECTROCARDIOGRAM COMPLETE: CPT | Performed by: NURSE PRACTITIONER

## 2017-05-26 PROCEDURE — 99215 OFFICE O/P EST HI 40 MIN: CPT | Mod: 25 | Performed by: NURSE PRACTITIONER

## 2017-05-26 PROCEDURE — 76536 US EXAM OF HEAD AND NECK: CPT

## 2017-05-26 RX ORDER — ISOSORBIDE MONONITRATE 30 MG/1
30 TABLET, EXTENDED RELEASE ORAL DAILY
Qty: 90 TABLET | Refills: 3 | Status: SHIPPED | OUTPATIENT
Start: 2017-05-26 | End: 2018-05-30

## 2017-05-26 RX ORDER — LISINOPRIL 20 MG/1
20 TABLET ORAL 2 TIMES DAILY
COMMUNITY
End: 2017-11-13

## 2017-05-26 NOTE — MR AVS SNAPSHOT
After Visit Summary   5/26/2017    Jayro Elder    MRN: [id number]           Patient Information     Date Of Birth          1950        Visit Information        Provider Department      5/26/2017 10:10 AM Anni Bradley APRN CNP [facility] [place]        Today's Diagnoses     Cardiomyopathy (H)        Coronary artery disease involving native coronary artery of native heart without angina pectoris        Cardiovascular disease        Essential hypertension with goal blood pressure less than 140/90        Tobacco use disorder        Type 2 diabetes mellitus without complication (H)        Mixed hyperlipidemia        Coronary artery disease involving native coronary artery of native heart with angina pectoris (H)        Hypertension goal BP (blood pressure) < 140/90          Care Instructions    -Schedule a stress test as I am concerned that some of your symptoms are due to needing another stent and not just the abnormal heart rhythm  -Talk with Dr. Berry today about being able to safely take blood thinning medications (warfarin or NOAC) for the atrial fibrillation  -Increase the isosorbide to one full pill daily (30 mg)  -Schedule to see Dr. Mckeon for a follow up visit within the next few weeks to review the stress test result and discuss blood thinning medications          Follow-ups after your visit        Additional Services     Follow-Up with Cardiologist                 Your next 10 appointments already scheduled     May 26, 2017  1:30 PM CDT   US THYROID with SHUS5   [facility] Ultrasound ([facility])    [address] [place] [address] [phone]           Please bring a list of your medicines (including vitamins, minerals and over-the-counter drugs). Also, tell your doctor about any allergies you may have. Wear comfortable clothes and leave your valuables at home.  You do not need to do anything  special to prepare for your exam.  Please call the Imaging Department at your exam site with any questions.            May 31, 2017  2:15 PM CDT   Neuro Treatment with Denisa Hannon, PT   [facility] Physical Therapy ([facility])    [address] [facility] [address]   [phone]            Jun 01, 2017  3:00 PM CDT   Neuro Treatment with Vivian Plunkett, SLP   [facility] Speech Therapy ([facility])    [address] [facility] [facility] [address]   [phone]            Jun 01, 2017  4:15 PM CDT   Neuro Treatment with Patsy Martinez, PT   [facility] Physical Therapy ([facility])    [address] [facility] [address]   [phone]            Jun 02, 2017  2:15 PM CDT   Neuro Treatment with Vivian Plunkett, SLP   [facility] Speech Therapy ([facility])    [address] [facility] [address]   [phone]            Jun 06, 2017  2:15 PM CDT   Neuro Treatment with Patsy Martinez, PT   [facility] Physical Therapy ([facility])    [address] [facility] [address]   [phone]            Jun 06, 2017  3:45 PM CDT   Neuro Treatment with Vivian Plunkett, SLP   [facility] Speech Therapy ([facility])    [address] [facility] [address]   [phone]            Jun 08, 2017  3:00 PM CDT   Neuro Treatment with Patsy Martinez, PT   [facility] Physical Therapy ([facility])    [address] [facility] [address]   [phone]            Jun 13, 2017  2:00 PM CDT   Neuro Treatment with Patsy Martinez, PT   [facility] Physical Therapy ([facility])    [address] [facility] [address]   [phone]            Lai 15, 2017  2:45 PM CDT   Neuro Treatment with Patsy Martinez, PT   [facility] Physical Therapy ([facility])    [address]  [facility] [address]   [phone]              Future tests that were ordered for you today     Open Future Orders        Priority Expected Expires Ordered    Follow-Up with Cardiologist Routine 2017    NM Lexiscan stress test (nuc card) Routine 2017            Who to contact     If you have questions or need follow up information about today's clinic visit or your schedule please contact [facility] [place] directly at [phone].  Normal or non-critical lab and imaging results will be communicated to you by [employer], letter or phone within 4 business days after the clinic has received the results. If you do not hear from us within 7 days, please contact the clinic through [employer] or phone. If you have a critical or abnormal lab result, we will notify you by phone as soon as possible.  Submit refill requests through [employer] or call your pharmacy and they will forward the refill request to us. Please allow 3 business days for your refill to be completed.          Additional Information About Your Visit        MyChart Information     [employer] lets you send messages to your doctor, view your test results, renew your prescriptions, schedule appointments and more. To sign up, go to [email or web address] . Click on \"Log in\" on the left side of the screen, which will take you to the Welcome page. Then click on \"Sign up Now\" on the right side of the page.     You will be asked to enter the access code listed below, as well as some personal information. Please follow the directions to create your username and password.     Your access code is: [email or web address]  Expires: 2017 11:13 AM     Your access code will  in 90 days. If you need help or a new code, please call your [place] clinic or [phone].        Care EveryWhere ID     This is your Care EveryWhere ID. This could be used by other organizations to access your " [facility] medical records  [id number]        Your Vitals Were     Pulse Height BMI (Body Mass Index)             60 1.829 m (6') 31.11 kg/m2          Blood Pressure from Last 3 Encounters:   05/26/17 108/68   04/28/17 138/84   04/26/17 139/87    Weight from Last 3 Encounters:   05/26/17 104.1 kg (229 lb 6.4 oz)   04/28/17 101.6 kg (224 lb)   04/22/17 103.9 kg (229 lb 0.9 oz)              We Performed the Following     EKG 12-lead complete w/read - Clinics (performed today)     Follow-Up with Cardiologist          Today's Medication Changes          These changes are accurate as of: 5/26/17 11:07 AM.  If you have any questions, ask your nurse or doctor.               These medicines have changed or have updated prescriptions.        Dose/Directions    isosorbide mononitrate 30 MG 24 hr tablet   Commonly known as:  IMDUR   This may have changed:  how much to take   Used for:  Hypertension goal BP (blood pressure) < 140/90   Changed by:  Anni Bradley APRN CNP        Dose:  30 mg   Take 1 tablet (30 mg) by mouth daily   Quantity:  90 tablet   Refills:  3            Where to get your medicines      These medications were sent to [id number] - [place], [place] - [address]  [address] [facility] [address]     Phone:  [phone]     isosorbide mononitrate 30 MG 24 hr tablet                Primary Care Provider Office Phone # Fax #    Davion Cordova PA-C [phone]       [facility] [address] [facility] [address]        Thank you!     Thank you for choosing [facility] [place]  for your care. Our goal is always to provide you with excellent care. Hearing back from our patients is one way we can continue to improve our services. Please take a few minutes to complete the written survey that you may receive in the mail after your visit with us. Thank you!             Your Updated Medication List - Protect  others around you: Learn how to safely use, store and throw away your medicines at www.disposemymeds.org.          This list is accurate as of: 5/26/17 11:07 AM.  Always use your most recent med list.                   Brand Name Dispense Instructions for use    amLODIPine 5 MG tablet    NORVASC    180 tablet    Take 1 tablet (5 mg) by mouth 2 times daily       Blood Pressure Monitor Kit     1 kit    Use to monitor blood pressure daily or as directed       insulin glargine 100 UNIT/ML injection     6 mL    Inject 20 Units Subcutaneous every 24 hours       isosorbide mononitrate 30 MG 24 hr tablet    IMDUR    90 tablet    Take 1 tablet (30 mg) by mouth daily       levothyroxine 137 MCG tablet    SYNTHROID/LEVOTHROID    90 tablet    Take 137 mcg by mouth At Bedtime       lisinopril 20 MG tablet    PRINIVIL/ZESTRIL     Take 20 mg by mouth 2 times daily       metFORMIN 1000 MG tablet    GLUCOPHAGE    60 tablet    Take 1 tablet (1,000 mg) by mouth 2 times daily (with meals)       metoprolol 100 MG 24 hr tablet    TOPROL-XL    90 tablet    Take 1 tablet (100 mg) by mouth At Bedtime       nitroglycerin 0.4 MG sublingual tablet    NITROSTAT    50 tablet    Place 1 tablet (0.4 mg) under the tongue every 5 minutes as needed for chest pain       ONE TOUCH LANCETS Misc     120    use qid       ONE TOUCH ULTRA test strip   Generic drug:  blood glucose monitoring     120    use qid       order for DME     1 each    Equipment being ordered: Cane () Treatment Diagnosis: Impaired balance       pantoprazole 40 MG EC tablet    PROTONIX    30 tablet    Take 1 tablet (40 mg) by mouth every morning (before breakfast)       simvastatin 40 MG tablet    ZOCOR    90 tablet    Take 1 tablet (40 mg) by mouth At Bedtime

## 2017-05-26 NOTE — LETTER
5/26/2017    Davion Cordova PA-C  [address] [facility] [address]    RE: Jayro MESSINA Jael       Dear Colleague,    I had the pleasure of seeing Jayro Elder in the [facility].    Mr. Elder is a delightful 66-year-old gentleman who follows with Dr. Mckeon.  He has a history o coronary artery disease with several interventions to the LAD in the past, insulin-dependent diabetes, hypertension, hyperlipidemia, hypothyroidism, sleep apnea and mild obesity.      He presented to the hospital in April of this year with three days of ongoing vision changes.  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  He was also found to be in new atrial fibrillation which was thought to be the culprit of his CVI.  He was asymptomatic from the atrial fibrillation.  He was treated with rate control therapy with beta blocker.  Anticoagulation was not started given the hemorrhagic transformation of his CVI.  He was advised to have a followup MRI and Neurology visit as an outpatient to determine if anticoagulation could safely be started in the future (1 month out).  In regards to his coronary disease, he did not have any acute EKG changes or troponin elevation during his hospitalization.  He complained of chest discomfort and throat tightness.  At first, this was though to be related to the AFib.  Despite adequate rate control of his AFib, he continued to have symptoms.  He was discharged on isosorbide with recommendations to consider an outpatient stress test.  He actually had 2 echocardiograms performed.  The first showed an LVEF of 45%.  The second, a few days later, showed an ejection fraction of 35%-40% with distal anterior, mid to distal anteroseptal and septal hypokinesis.  RV was normal.  There was no significant valvular disease.      In clinic today, Mr. Elder tells me he feels he is improving slowly.  He continues to have residual vision  deficits from the stroke.  He is working with therapy.  He was also having ongoing issues with throat and chest tightness.  These are not necessarily exertional in nature.  He does feel the isosorbide started in the hospital has been helping his symptoms.  He denies any lightheadedness, dizziness, palpitations, presyncope, syncope, breathlessness or edema.      In clinic today, EKG was performed showing atrial fibrillation with a right bundle branch block and a heart rate of 67 beats per minute.  Blood pressure in clinic in is 108/68, weight is 229 pounds.      PHYSICAL EXAMINATION:   GENERAL:  Reveals a well-appearing gentleman in no acute distress.   HEENT:  Normocephalic, atraumatic.   NECK:  Supple, without carotid bruits or jugular venous distention.   LUNGS:  Clear.   CARDIAC:  Reveals an S1, S2, with an irregularly irregular rhythm.   ABDOMEN:  Soft.    EXTREMITIES:  Lower extremities show trace edema.     Outpatient Encounter Prescriptions as of 5/26/2017   Medication Sig Dispense Refill     lisinopril (PRINIVIL/ZESTRIL) 20 MG tablet Take 20 mg by mouth 2 times daily       isosorbide mononitrate (IMDUR) 30 MG 24 hr tablet Take 1 tablet (30 mg) by mouth daily 90 tablet 3     simvastatin (ZOCOR) 40 MG tablet Take 1 tablet (40 mg) by mouth At Bedtime 90 tablet 3     metoprolol (TOPROL-XL) 100 MG 24 hr tablet Take 1 tablet (100 mg) by mouth At Bedtime 90 tablet 3     nitroglycerin (NITROSTAT) 0.4 MG sublingual tablet Place 1 tablet (0.4 mg) under the tongue every 5 minutes as needed for chest pain 50 tablet 0     [DISCONTINUED] amLODIPine (NORVASC) 5 MG tablet Take 1 tablet (5 mg) by mouth 2 times daily 180 tablet 3     [DISCONTINUED] insulin glargine (BASAGLAR KWIKPEN) 100 UNIT/ML injection Inject 20 Units Subcutaneous every 24 hours 6 mL 0     pantoprazole (PROTONIX) 40 MG enteric coated tablet Take 1 tablet (40 mg) by mouth every morning (before breakfast) 30 tablet 0     metFORMIN (GLUCOPHAGE) 1000 MG tablet  Take 1 tablet (1,000 mg) by mouth 2 times daily (with meals) 60 tablet 0     levothyroxine (SYNTHROID, LEVOTHROID) 137 MCG tablet Take 137 mcg by mouth At Bedtime  90 tablet 3     Blood Pressure Monitor KIT Use to monitor blood pressure daily or as directed 1 kit 0     [DISCONTINUED] isosorbide mononitrate (IMDUR) 30 MG 24 hr tablet Take 0.5 tablets (15 mg) by mouth daily 45 tablet 1     [DISCONTINUED] lisinopril (PRINIVIL/ZESTRIL) 20 MG tablet Take 2 tablets (40 mg) by mouth At Bedtime 180 tablet 1     order for DME Equipment being ordered: Cane ()  Treatment Diagnosis: Impaired balance 1 each 0     [DISCONTINUED] sitagliptan (JANUVIA) 50 MG tablet Take 50 mg by mouth daily       ONE TOUCH ULTRA TEST VI STRP use qid 120 11     ONE TOUCH LANCETS MISC use qid 120 4     No facility-administered encounter medications on file as of 5/26/2017.       IMPRESSION AND PLAN:   1.  Recent hospitalization for acute CVI.  He had vision changes for three days before he presented to the ER where he was found to have an embolic stroke with hemorrhagic transformation.  He continues to have a vision deficit and is following with therapy.  The cause of the stroke is thought to be secondary to AFib.  This was a new discovery as well.  Because of the hemorrhagic transformation, he has not been placed on anticoagulation.  He is actually seeing Dr. Berry this afternoon to follow up on an MRI.  At that point, it will be determined if he can safely start anticoagulation.   2.  Atrial fibrillation.  New finding while hospitalized and of unknown duration.  Currently rate controlled on beta blocker therapy.  It seems to be asymptomatic at this point in time.  We will continue with rate control therapy and perhaps entertain a rhythm control therapy once he can be anticoagulated.  For the time being, he is stable and asymptomatic.   3.  History of coronary artery disease with previous interventions to the LAD.  Last intervention was in  2013.  He has been having chest and neck tightness since being hospitalized.  At first this was thought to be secondary to AFib, although he continued to have this symptom with adequate rate control and so isosorbide was added prior to hospital discharge.  An outpatient stress test was suggested.  We will perform nuclear stress testing to look for any evidence of ischemia.   4.  Cardiomyopathy.  Historically with an ejection fraction around 45%-50%.  Most recent echo while he was hospitalized done on 04/25 showed a drop to 35%-40% (although echo earlier in the hospital stay showed a reported LVEF of 45%).  He is free of congestive heart failure on exam.  He is appropriately on beta blocker and ACE inhibitor.  We are repeating a stress test to see if there are any new significant areas of ischemia that could be contributing to the drop in ejection fraction.  Additionally, he has had interim development of AFib which may be a contributing factor.  5.  Hypertension with well-controlled blood pressures in clinic today.   6.  Dyslipidemia, currently on simvastatin 40 mg.  Would strongly consider escalating to a high-intensity statin for secondary prevention.   7.  Type 2 diabetes.  Recently poorly controlled with hemoglobin A1c of 11.7 on 04/22.  This needs to be followed and treated aggressively.   8.  Hypothyroidism with multinodular goiter.  He follows with Endocrinology.   9.  Bilateral lower extremity foot pressure ulcerations.  This was noted in the hospital discharge summary.  Apparently he is going to be following with Podiatry as an outpatient.      It was my pleasure participating in the care of Mr. Elder in clinic today.  He will need followup with Neurology today to further discuss future anticoagulation as well as a stress test and close followup with Cardiology.  I will have him see Dr. Mckeon in followup after these above-mentioned evaluations have taken place.      Greater than 50% of this 60-minute  visit was spent in records review, coordination of care, and education and counseling.     Again, thank you for allowing me to participate in the care of your patient.      Sincerely,    Anni Bradley NP, APRN [facility]

## 2017-05-26 NOTE — PROGRESS NOTES
HPI and Plan:   See dictation    Orders Placed This Encounter   Procedures     NM Lexiscan stress test (nuc card)     Follow-Up with Cardiologist     EKG 12-lead complete w/read - Clinics (performed today)       Orders Placed This Encounter   Medications     lisinopril (PRINIVIL/ZESTRIL) 20 MG tablet     Sig: Take 20 mg by mouth 2 times daily     isosorbide mononitrate (IMDUR) 30 MG 24 hr tablet     Sig: Take 1 tablet (30 mg) by mouth daily     Dispense:  90 tablet     Refill:  3       Medications Discontinued During This Encounter   Medication Reason     lisinopril (PRINIVIL/ZESTRIL) 20 MG tablet Medication Reconciliation Clean Up     sitagliptan (JANUVIA) 50 MG tablet Medication Reconciliation Clean Up     isosorbide mononitrate (IMDUR) 30 MG 24 hr tablet Reorder         Encounter Diagnoses   Name Primary?     Cardiomyopathy (H)      Coronary artery disease involving native coronary artery of native heart without angina pectoris      Cardiovascular disease      Essential hypertension with goal blood pressure less than 140/90      Tobacco use disorder      Type 2 diabetes mellitus without complication (H)      Mixed hyperlipidemia      Coronary artery disease involving native coronary artery of native heart with angina pectoris (H)      Hypertension goal BP (blood pressure) < 140/90        CURRENT MEDICATIONS:  Current Outpatient Prescriptions   Medication Sig Dispense Refill     lisinopril (PRINIVIL/ZESTRIL) 20 MG tablet Take 20 mg by mouth 2 times daily       isosorbide mononitrate (IMDUR) 30 MG 24 hr tablet Take 1 tablet (30 mg) by mouth daily 90 tablet 3     simvastatin (ZOCOR) 40 MG tablet Take 1 tablet (40 mg) by mouth At Bedtime 90 tablet 3     metoprolol (TOPROL-XL) 100 MG 24 hr tablet Take 1 tablet (100 mg) by mouth At Bedtime 90 tablet 3     nitroglycerin (NITROSTAT) 0.4 MG sublingual tablet Place 1 tablet (0.4 mg) under the tongue every 5 minutes as needed for chest pain 50 tablet 0     amLODIPine  (NORVASC) 5 MG tablet Take 1 tablet (5 mg) by mouth 2 times daily 180 tablet 3     insulin glargine (BASAGLAR KWIKPEN) 100 UNIT/ML injection Inject 20 Units Subcutaneous every 24 hours 6 mL 0     pantoprazole (PROTONIX) 40 MG enteric coated tablet Take 1 tablet (40 mg) by mouth every morning (before breakfast) 30 tablet 0     metFORMIN (GLUCOPHAGE) 1000 MG tablet Take 1 tablet (1,000 mg) by mouth 2 times daily (with meals) 60 tablet 0     levothyroxine (SYNTHROID, LEVOTHROID) 137 MCG tablet Take 137 mcg by mouth At Bedtime  90 tablet 3     Blood Pressure Monitor KIT Use to monitor blood pressure daily or as directed 1 kit 0     [DISCONTINUED] isosorbide mononitrate (IMDUR) 30 MG 24 hr tablet Take 0.5 tablets (15 mg) by mouth daily 45 tablet 1     order for DME Equipment being ordered: Cane ()  Treatment Diagnosis: Impaired balance 1 each 0     ONE TOUCH ULTRA TEST VI STRP use qid 120 11     ONE TOUCH LANCETS MISC use qid 120 4       ALLERGIES     Allergies   Allergen Reactions     No Known Allergies        PAST MEDICAL HISTORY:  Past Medical History:   Diagnosis Date     CAD (coronary artery disease) 6/29/05    anterior MI,  PTCA and stent placed in mid LAD     Cardiomyopathy (H)      Essential hypertension, benign 11/13/2002     Generalized osteoarthrosis, unspecified site 11/13/2002     Myocardial infarction (H)      Neuropathy (H)      Tobacco use disorder 11/13/2002     Type II or unspecified type diabetes mellitus without mention of complication, not stated as uncontrolled 7/14/2005       PAST SURGICAL HISTORY:  Past Surgical History:   Procedure Laterality Date     ANGIOGRAM  6/29/05    subtotal occ.mid LAD,SUSAN mid LAD     ANGIOGRAM  7/05    mild CAD,patent stent,no flow-limiting lesions,sev.LV dysf.LAD enlarged     ANGIOGRAM  2/09    Sev.single vessel disease,Mod LV dysf.distal LAD 90%,70-75% mid lad just before prev stent,SUSAN to prox.mid LAD, endeavor to distal LAD     ANGIOGRAM  11/13/13    restenosis,  stent LAD     C NONSPECIFIC PROCEDURE      Laminectomy x 3 - (1983 x 2 & 1990)     C NONSPECIFIC PROCEDURE Bilateral 1998    Bunionectomy     C NONSPECIFIC PROCEDURE  1959    Gingival surgery at age 9       FAMILY HISTORY:  Family History   Problem Relation Age of Onset     Cancer - colorectal Sister      Thyroid Disease Brother      Cardiovascular Brother      DIABETES Brother      CANCER Father      tumor in chest and throat     Arthritis Mother      Thyroid Disease Mother      DIABETES Mother        SOCIAL HISTORY:  Social History     Social History     Marital status:      Spouse name: N/A     Number of children: N/A     Years of education: N/A     Social History Main Topics     Smoking status: Former Smoker     Packs/day: 1.00     Years: 25.00     Types: Cigarettes     Quit date: 7/25/2005     Smokeless tobacco: Never Used     Alcohol use Yes      Comment: occasional     Drug use: No     Sexual activity: Yes     Partners: Female     Other Topics Concern     Parent/Sibling W/ Cabg, Mi Or Angioplasty Before 65f 55m? Yes     Caffeine Concern No     3 cups daily     Special Diet Yes     watching carb intake     Exercise No     some walking     Social History Narrative       Review of Systems:  Skin:  Positive for bruising     Eyes:  Positive for glasses    ENT:  Negative for      Respiratory:  Positive for sleep apnea;shortness of breath     Cardiovascular:    edema;Positive for;chest pain;palpitations    Gastroenterology: Negative      Genitourinary:  Negative      Musculoskeletal:  Positive for arthritis    Neurologic:  Positive for numbness or tingling of feet;stroke;headaches    Psychiatric:  Negative      Heme/Lymph/Imm:  Negative      Endocrine:  Positive for diabetes;thyroid disorder      Physical Exam:  Vitals: /68  Pulse 60  Ht 1.829 m (6')  Wt 104.1 kg (229 lb 6.4 oz)  BMI 31.11 kg/m2    Constitutional:           Skin:           Head:           Eyes:           ENT:           Neck:            Chest:             Cardiac:                    Abdomen:           Vascular:                                          Extremities and Back:                 Neurological:                 CC  Johnathan Mckeon MD   [facility]  [id number]  [place] [id number]

## 2017-05-26 NOTE — PATIENT INSTRUCTIONS
-Schedule a stress test as I am concerned that some of your symptoms are due to needing another stent and not just the abnormal heart rhythm  -Talk with Dr. Berry today about being able to safely take blood thinning medications (warfarin or NOAC) for the atrial fibrillation  -Increase the isosorbide to one full pill daily (30 mg)  -Schedule to see Dr. Mckeon for a follow up visit within the next few weeks to review the stress test result and discuss blood thinning medications

## 2017-05-30 NOTE — PROGRESS NOTES
HISTORY OF PRESENT ILLNESS:  Mr. Elder is a delightful 66-year-old gentleman who follows with Dr. Mckeon.  He has a history o coronary artery disease with several interventions to the LAD in the past, insulin-dependent diabetes, hypertension, hyperlipidemia, hypothyroidism, sleep apnea and mild obesity.      He presented to the hospital in April of this year with three days of ongoing vision changes.  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  He was also found to be in new atrial fibrillation which was thought to be the culprit of his CVI.  He was asymptomatic from the atrial fibrillation.  He was treated with rate control therapy with beta blocker.  Anticoagulation was not started given the hemorrhagic transformation of his CVI.  He was advised to have a followup MRI and Neurology visit as an outpatient to determine if anticoagulation could safely be started in the future (1 month out).  In regards to his coronary disease, he did not have any acute EKG changes or troponin elevation during his hospitalization.  He complained of chest discomfort and throat tightness.  At first, this was though to be related to the AFib.  Despite adequate rate control of his AFib, he continued to have symptoms.  He was discharged on isosorbide with recommendations to consider an outpatient stress test.  He actually had 2 echocardiograms performed.  The first showed an LVEF of 45%.  The second, a few days later, showed an ejection fraction of 35%-40% with distal anterior, mid to distal anteroseptal and septal hypokinesis.  RV was normal.  There was no significant valvular disease.      In clinic today, Mr. Elder tells me he feels he is improving slowly.  He continues to have residual vision deficits from the stroke.  He is working with therapy.  He was also having ongoing issues with throat and chest tightness.  These are not necessarily exertional in nature.  He does feel the  isosorbide started in the hospital has been helping his symptoms.  He denies any lightheadedness, dizziness, palpitations, presyncope, syncope, breathlessness or edema.      In clinic today, EKG was performed showing atrial fibrillation with a right bundle branch block and a heart rate of 67 beats per minute.  Blood pressure in clinic in is 108/68, weight is 229 pounds.      PHYSICAL EXAMINATION:   GENERAL:  Reveals a well-appearing gentleman in no acute distress.   HEENT:  Normocephalic, atraumatic.   NECK:  Supple, without carotid bruits or jugular venous distention.   LUNGS:  Clear.   CARDIAC:  Reveals an S1, S2, with an irregularly irregular rhythm.   ABDOMEN:  Soft.    EXTREMITIES:  Lower extremities show trace edema.      IMPRESSION AND PLAN:   1.  Recent hospitalization for acute CVI.  He had vision changes for three days before he presented to the ER where he was found to have an embolic stroke with hemorrhagic transformation.  He continues to have a vision deficit and is following with therapy.  The cause of the stroke is thought to be secondary to AFib.  This was a new discovery as well.  Because of the hemorrhagic transformation, he has not been placed on anticoagulation.  He is actually seeing Dr. Berry this afternoon to follow up on an MRI.  At that point, it will be determined if he can safely start anticoagulation.   2.  Atrial fibrillation.  New finding while hospitalized and of unknown duration.  Currently rate controlled on beta blocker therapy.  It seems to be asymptomatic at this point in time.  We will continue with rate control therapy and perhaps entertain a rhythm control therapy once he can be anticoagulated.  For the time being, he is stable and asymptomatic.   3.  History of coronary artery disease with previous interventions to the LAD.  Last intervention was in 2013.  He has been having chest and neck tightness since being hospitalized.  At first this was thought to be secondary to  AFib, although he continued to have this symptom with adequate rate control and so isosorbide was added prior to hospital discharge.  An outpatient stress test was suggested.  We will perform nuclear stress testing to look for any evidence of ischemia.   4.  Cardiomyopathy.  Historically with an ejection fraction around 45%-50%.  Most recent echo while he was hospitalized done on 04/25 showed a drop to 35%-40% (although echo earlier in the hospital stay showed a reported LVEF of 45%).  He is free of congestive heart failure on exam.  He is appropriately on beta blocker and ACE inhibitor.  We are repeating a stress test to see if there are any new significant areas of ischemia that could be contributing to the drop in ejection fraction.  Additionally, he has had interim development of AFib which may be a contributing factor.  5.  Hypertension with well-controlled blood pressures in clinic today.   6.  Dyslipidemia, currently on simvastatin 40 mg.  Would strongly consider escalating to a high-intensity statin for secondary prevention.   7.  Type 2 diabetes.  Recently poorly controlled with hemoglobin A1c of 11.7 on 04/22.  This needs to be followed and treated aggressively.   8.  Hypothyroidism with multinodular goiter.  He follows with Endocrinology.   9.  Bilateral lower extremity foot pressure ulcerations.  This was noted in the hospital discharge summary.  Apparently he is going to be following with Podiatry as an outpatient.      It was my pleasure participating in the care of Mr. Elder in clinic today.  He will need followup with Neurology today to further discuss future anticoagulation as well as a stress test and close followup with Cardiology.  I will have him see Dr. Mckeon in followup after these above-mentioned evaluations have taken place.      Greater than 50% of this 60-minute visit was spent in records review, coordination of care, and education and counseling.         LATESHA HI, APRN, CNP              D: 2017 17:09   T: 2017 21:31   MT: DARIEL      Name:     PORTER YANCEY   MRN:      [id number]        Account:      [id number]   :      1950           Service Date: 2017      Document: [id number]

## 2017-05-31 ENCOUNTER — HOSPITAL ENCOUNTER (OUTPATIENT)
Dept: PHYSICAL THERAPY | Facility: CLINIC | Age: 67
Setting detail: THERAPIES SERIES
End: 2017-05-31
Attending: HOSPITALIST
Payer: COMMERCIAL

## 2017-05-31 PROCEDURE — 97112 NEUROMUSCULAR REEDUCATION: CPT | Mod: GP | Performed by: PHYSICAL THERAPIST

## 2017-05-31 PROCEDURE — 97110 THERAPEUTIC EXERCISES: CPT | Mod: GP | Performed by: PHYSICAL THERAPIST

## 2017-05-31 PROCEDURE — 40000719 ZZHC STATISTIC PT DEPARTMENT NEURO VISIT: Performed by: PHYSICAL THERAPIST

## 2017-06-01 ENCOUNTER — HOSPITAL ENCOUNTER (OUTPATIENT)
Dept: SPEECH THERAPY | Facility: CLINIC | Age: 67
Setting detail: THERAPIES SERIES
End: 2017-06-01
Attending: HOSPITALIST
Payer: COMMERCIAL

## 2017-06-01 ENCOUNTER — HOSPITAL ENCOUNTER (OUTPATIENT)
Dept: PHYSICAL THERAPY | Facility: CLINIC | Age: 67
Setting detail: THERAPIES SERIES
End: 2017-06-01
Attending: HOSPITALIST
Payer: COMMERCIAL

## 2017-06-01 PROCEDURE — 97116 GAIT TRAINING THERAPY: CPT | Mod: GP | Performed by: PHYSICAL THERAPIST

## 2017-06-01 PROCEDURE — 40000719 ZZHC STATISTIC PT DEPARTMENT NEURO VISIT: Performed by: PHYSICAL THERAPIST

## 2017-06-01 PROCEDURE — 97112 NEUROMUSCULAR REEDUCATION: CPT | Mod: GP | Performed by: PHYSICAL THERAPIST

## 2017-06-01 PROCEDURE — 40000211 ZZHC STATISTIC SLP  DEPARTMENT VISIT: Performed by: SPEECH-LANGUAGE PATHOLOGIST

## 2017-06-01 PROCEDURE — 97532 ZZHC SP COGNITIVE SKILLS EA 15 MIN: CPT | Mod: GN | Performed by: SPEECH-LANGUAGE PATHOLOGIST

## 2017-06-02 ENCOUNTER — HOSPITAL ENCOUNTER (OUTPATIENT)
Dept: SPEECH THERAPY | Facility: CLINIC | Age: 67
Setting detail: THERAPIES SERIES
End: 2017-06-02
Attending: HOSPITALIST
Payer: COMMERCIAL

## 2017-06-02 ENCOUNTER — HOSPITAL ENCOUNTER (OUTPATIENT)
Dept: CARDIOLOGY | Facility: CLINIC | Age: 67
Discharge: HOME | End: 2017-06-02
Attending: NURSE PRACTITIONER | Admitting: NURSE PRACTITIONER
Payer: COMMERCIAL

## 2017-06-02 PROCEDURE — 34300033 ZZH RX 343: Performed by: INTERNAL MEDICINE

## 2017-06-02 PROCEDURE — 93016 CV STRESS TEST SUPVJ ONLY: CPT | Performed by: INTERNAL MEDICINE

## 2017-06-02 PROCEDURE — 25000128 H RX IP 250 OP 636: Performed by: INTERNAL MEDICINE

## 2017-06-02 PROCEDURE — A9502 TC99M TETROFOSMIN: HCPCS | Performed by: INTERNAL MEDICINE

## 2017-06-02 PROCEDURE — 78452 HT MUSCLE IMAGE SPECT MULT: CPT

## 2017-06-02 PROCEDURE — 97532 ZZHC SP COGNITIVE SKILLS EA 15 MIN: CPT | Mod: GN | Performed by: SPEECH-LANGUAGE PATHOLOGIST

## 2017-06-02 PROCEDURE — 93018 CV STRESS TEST I&R ONLY: CPT | Performed by: INTERNAL MEDICINE

## 2017-06-02 PROCEDURE — 40000211 ZZHC STATISTIC SLP  DEPARTMENT VISIT: Performed by: SPEECH-LANGUAGE PATHOLOGIST

## 2017-06-02 PROCEDURE — 78452 HT MUSCLE IMAGE SPECT MULT: CPT | Mod: 26 | Performed by: INTERNAL MEDICINE

## 2017-06-02 RX ORDER — ALBUTEROL SULFATE 90 UG/1
2 AEROSOL, METERED RESPIRATORY (INHALATION) EVERY 5 MIN PRN
Status: DISCONTINUED | OUTPATIENT
Start: 2017-06-02 | End: 2017-06-03 | Stop reason: HOSPADM

## 2017-06-02 RX ORDER — REGADENOSON 0.08 MG/ML
0.4 INJECTION, SOLUTION INTRAVENOUS ONCE
Status: COMPLETED | OUTPATIENT
Start: 2017-06-02 | End: 2017-06-02

## 2017-06-02 RX ORDER — ACYCLOVIR 200 MG/1
0-1 CAPSULE ORAL
Status: DISCONTINUED | OUTPATIENT
Start: 2017-06-02 | End: 2017-06-03 | Stop reason: HOSPADM

## 2017-06-02 RX ORDER — AMINOPHYLLINE 25 MG/ML
50-100 INJECTION, SOLUTION INTRAVENOUS
Status: DISCONTINUED | OUTPATIENT
Start: 2017-06-02 | End: 2017-06-03 | Stop reason: HOSPADM

## 2017-06-02 RX ADMIN — TETROFOSMIN 4 MCI.: 1.38 INJECTION, POWDER, LYOPHILIZED, FOR SOLUTION INTRAVENOUS at 09:57

## 2017-06-02 RX ADMIN — TETROFOSMIN 12.6 MCI.: 1.38 INJECTION, POWDER, LYOPHILIZED, FOR SOLUTION INTRAVENOUS at 11:20

## 2017-06-02 RX ADMIN — REGADENOSON 0.4 MG: 0.08 INJECTION, SOLUTION INTRAVENOUS at 11:19

## 2017-06-05 ENCOUNTER — TELEPHONE (OUTPATIENT)
Dept: PHARMACY | Facility: CLINIC | Age: 67
End: 2017-06-05

## 2017-06-06 ENCOUNTER — HOSPITAL ENCOUNTER (OUTPATIENT)
Dept: SPEECH THERAPY | Facility: CLINIC | Age: 67
Setting detail: THERAPIES SERIES
End: 2017-06-06
Attending: HOSPITALIST
Payer: COMMERCIAL

## 2017-06-06 ENCOUNTER — HOSPITAL ENCOUNTER (OUTPATIENT)
Dept: PHYSICAL THERAPY | Facility: CLINIC | Age: 67
Setting detail: THERAPIES SERIES
End: 2017-06-06
Attending: HOSPITALIST
Payer: COMMERCIAL

## 2017-06-06 PROCEDURE — 97116 GAIT TRAINING THERAPY: CPT | Mod: GP | Performed by: PHYSICAL THERAPIST

## 2017-06-06 PROCEDURE — 97112 NEUROMUSCULAR REEDUCATION: CPT | Mod: GP | Performed by: PHYSICAL THERAPIST

## 2017-06-06 PROCEDURE — 40000211 ZZHC STATISTIC SLP  DEPARTMENT VISIT: Performed by: SPEECH-LANGUAGE PATHOLOGIST

## 2017-06-06 PROCEDURE — 97532 ZZHC SP COGNITIVE SKILLS EA 15 MIN: CPT | Mod: GN | Performed by: SPEECH-LANGUAGE PATHOLOGIST

## 2017-06-06 PROCEDURE — 40000719 ZZHC STATISTIC PT DEPARTMENT NEURO VISIT: Performed by: PHYSICAL THERAPIST

## 2017-06-07 ENCOUNTER — TELEPHONE (OUTPATIENT)
Dept: CARDIOLOGY | Facility: CLINIC | Age: 67
End: 2017-06-07

## 2017-06-08 ENCOUNTER — PRE VISIT (OUTPATIENT)
Dept: CARDIOLOGY | Facility: CLINIC | Age: 67
End: 2017-06-08

## 2017-06-08 ENCOUNTER — HOSPITAL ENCOUNTER (OUTPATIENT)
Dept: PHYSICAL THERAPY | Facility: CLINIC | Age: 67
Setting detail: THERAPIES SERIES
End: 2017-06-08
Attending: HOSPITALIST
Payer: COMMERCIAL

## 2017-06-08 PROCEDURE — 40000719 ZZHC STATISTIC PT DEPARTMENT NEURO VISIT: Performed by: PHYSICAL THERAPIST

## 2017-06-08 PROCEDURE — 97112 NEUROMUSCULAR REEDUCATION: CPT | Mod: GP | Performed by: PHYSICAL THERAPIST

## 2017-06-08 PROCEDURE — 97116 GAIT TRAINING THERAPY: CPT | Mod: GP | Performed by: PHYSICAL THERAPIST

## 2017-06-08 NOTE — TELEPHONE ENCOUNTER
Please call patient with stress test results.  There are some areas of concern which appear to be new.  In light of his drop in LVEF and symptom of throat tightness, I think he needs to be seen sooner by Dr. Mckeon and a cath needs to be considered.  We will need to get recent records from Dr. Berry/[facility]  which should discuss the ability to use blood thinning medications (anti-coagulation and anti-platelet) in light of his recent embolic stroke with hemorrhagic transformation.

## 2017-06-08 NOTE — TELEPHONE ENCOUNTER
Per Rashmi.  Called pt to review nuc showing new areas of ischemia.  She recommends f/u with Dr. Mckeon sooner than the previously scheduled OV on 6/22.  Appt scheduled for 1245 tomorrow (6/9) with Dr. Mckeon.  Neurology notes pending. Pt advised should his symptoms worsen he should be evaluated in the ER.  He expresses understanding.  KMortimer RN

## 2017-06-09 ENCOUNTER — DOCUMENTATION ONLY (OUTPATIENT)
Dept: CARDIOLOGY | Facility: CLINIC | Age: 67
End: 2017-06-09

## 2017-06-09 ENCOUNTER — OFFICE VISIT (OUTPATIENT)
Dept: CARDIOLOGY | Facility: CLINIC | Age: 67
End: 2017-06-09
Payer: COMMERCIAL

## 2017-06-09 VITALS
WEIGHT: 236 LBS | HEIGHT: 72 IN | SYSTOLIC BLOOD PRESSURE: 128 MMHG | BODY MASS INDEX: 31.97 KG/M2 | HEART RATE: 90 BPM | DIASTOLIC BLOOD PRESSURE: 78 MMHG

## 2017-06-09 DIAGNOSIS — I25.118 CORONARY ARTERY DISEASE INVOLVING NATIVE CORONARY ARTERY OF NATIVE HEART WITH OTHER FORM OF ANGINA PECTORIS (H): Primary | ICD-10-CM

## 2017-06-09 DIAGNOSIS — E11.29 TYPE 2 DIABETES MELLITUS WITH OTHER KIDNEY COMPLICATION: ICD-10-CM

## 2017-06-09 DIAGNOSIS — I25.118 CORONARY ARTERY DISEASE INVOLVING NATIVE CORONARY ARTERY OF NATIVE HEART WITH OTHER FORM OF ANGINA PECTORIS (H): ICD-10-CM

## 2017-06-09 DIAGNOSIS — I10 HYPERTENSION GOAL BP (BLOOD PRESSURE) < 140/90: ICD-10-CM

## 2017-06-09 DIAGNOSIS — I42.9 CARDIOMYOPATHY (H): ICD-10-CM

## 2017-06-09 DIAGNOSIS — R94.39 ABNORMAL STRESS TEST: Primary | ICD-10-CM

## 2017-06-09 DIAGNOSIS — I63.432 CEREBROVASCULAR ACCIDENT (CVA) DUE TO EMBOLISM OF LEFT POSTERIOR CEREBRAL ARTERY (H): ICD-10-CM

## 2017-06-09 LAB
ANION GAP SERPL CALCULATED.3IONS-SCNC: 9 MMOL/L (ref 3–14)
APTT PPP: 31 SEC (ref 22–37)
BUN SERPL-MCNC: 16 MG/DL (ref 7–30)
CALCIUM SERPL-MCNC: 9.1 MG/DL (ref 8.5–10.1)
CHLORIDE SERPL-SCNC: 101 MMOL/L (ref 94–109)
CO2 SERPL-SCNC: 25 MMOL/L (ref 20–32)
CREAT SERPL-MCNC: 0.8 MG/DL (ref 0.66–1.25)
ERYTHROCYTE [DISTWIDTH] IN BLOOD BY AUTOMATED COUNT: 13.1 % (ref 10–15)
GFR SERPL CREATININE-BSD FRML MDRD: ABNORMAL ML/MIN/1.7M2
GLUCOSE SERPL-MCNC: 385 MG/DL (ref 70–99)
HCT VFR BLD AUTO: 37.3 % (ref 40–53)
HGB BLD-MCNC: 12.4 G/DL (ref 13.3–17.7)
INR PPP: 1.18 (ref 0.86–1.14)
MCH RBC QN AUTO: 28.4 PG (ref 26.5–33)
MCHC RBC AUTO-ENTMCNC: 33.2 G/DL (ref 31.5–36.5)
MCV RBC AUTO: 85 FL (ref 78–100)
PLATELET # BLD AUTO: 223 10E9/L (ref 150–450)
POTASSIUM SERPL-SCNC: 4.2 MMOL/L (ref 3.4–5.3)
RBC # BLD AUTO: 4.37 10E12/L (ref 4.4–5.9)
SODIUM SERPL-SCNC: 135 MMOL/L (ref 133–144)
WBC # BLD AUTO: 6.7 10E9/L (ref 4–11)

## 2017-06-09 PROCEDURE — 85730 THROMBOPLASTIN TIME PARTIAL: CPT | Performed by: INTERNAL MEDICINE

## 2017-06-09 PROCEDURE — 99215 OFFICE O/P EST HI 40 MIN: CPT | Performed by: INTERNAL MEDICINE

## 2017-06-09 PROCEDURE — 85027 COMPLETE CBC AUTOMATED: CPT | Performed by: INTERNAL MEDICINE

## 2017-06-09 PROCEDURE — 36415 COLL VENOUS BLD VENIPUNCTURE: CPT | Performed by: INTERNAL MEDICINE

## 2017-06-09 PROCEDURE — 80048 BASIC METABOLIC PNL TOTAL CA: CPT | Performed by: INTERNAL MEDICINE

## 2017-06-09 PROCEDURE — 85610 PROTHROMBIN TIME: CPT | Performed by: INTERNAL MEDICINE

## 2017-06-09 NOTE — PROGRESS NOTES
HPI and Plan:   See dictation  Left Heart Catheterization    I have reviewed the catheterization procedures including risks and benefits.   This could include angiography, angioplasty, stenting, and other coronary interventions as the  Interventional cardiologist deems necessary.  Risks were discussed but may not be limited to death, heart attack, bleeding, kidney injury, stroke, urgent coronary bypass surgery and vascular injury.    No orders of the defined types were placed in this encounter.    Orders Placed This Encounter   Medications     Apixaban (ELIQUIS PO)     Sig: Take 2.5 mg by mouth daily     There are no discontinued medications.      No diagnosis found.    CURRENT MEDICATIONS:  Current Outpatient Prescriptions   Medication Sig Dispense Refill     Apixaban (ELIQUIS PO) Take 2.5 mg by mouth daily       lisinopril (PRINIVIL/ZESTRIL) 20 MG tablet Take 20 mg by mouth 2 times daily       isosorbide mononitrate (IMDUR) 30 MG 24 hr tablet Take 1 tablet (30 mg) by mouth daily 90 tablet 3     simvastatin (ZOCOR) 40 MG tablet Take 1 tablet (40 mg) by mouth At Bedtime 90 tablet 3     metoprolol (TOPROL-XL) 100 MG 24 hr tablet Take 1 tablet (100 mg) by mouth At Bedtime 90 tablet 3     Blood Pressure Monitor KIT Use to monitor blood pressure daily or as directed 1 kit 0     nitroglycerin (NITROSTAT) 0.4 MG sublingual tablet Place 1 tablet (0.4 mg) under the tongue every 5 minutes as needed for chest pain 50 tablet 0     amLODIPine (NORVASC) 5 MG tablet Take 1 tablet (5 mg) by mouth 2 times daily 180 tablet 3     order for DME Equipment being ordered: Cane ()  Treatment Diagnosis: Impaired balance 1 each 0     pantoprazole (PROTONIX) 40 MG enteric coated tablet Take 1 tablet (40 mg) by mouth every morning (before breakfast) 30 tablet 0     metFORMIN (GLUCOPHAGE) 1000 MG tablet Take 1 tablet (1,000 mg) by mouth 2 times daily (with meals) 60 tablet 0     levothyroxine (SYNTHROID, LEVOTHROID) 137 MCG tablet Take  137 mcg by mouth At Bedtime  90 tablet 3     ONE TOUCH ULTRA TEST VI STRP use qid 120 11     ONE TOUCH LANCETS MISC use qid 120 4     insulin glargine (BASAGLAR KWIKPEN) 100 UNIT/ML injection Inject 20 Units Subcutaneous every 24 hours 6 mL 0       ALLERGIES     Allergies   Allergen Reactions     No Known Allergies        PAST MEDICAL HISTORY:  Past Medical History:   Diagnosis Date     CAD (coronary artery disease) 6/29/05    anterior MI,  PTCA and stent placed in mid LAD     Cardiomyopathy (H)      Essential hypertension, benign 11/13/2002     Generalized osteoarthrosis, unspecified site 11/13/2002     Myocardial infarction (H)      Neuropathy (H)      Tobacco use disorder 11/13/2002     Type II or unspecified type diabetes mellitus without mention of complication, not stated as uncontrolled 7/14/2005       PAST SURGICAL HISTORY:  Past Surgical History:   Procedure Laterality Date     ANGIOGRAM  6/29/05    subtotal occ.mid LAD,SUSAN mid LAD     ANGIOGRAM  7/05    mild CAD,patent stent,no flow-limiting lesions,sev.LV dysf.LAD enlarged     ANGIOGRAM  2/09    Sev.single vessel disease,Mod LV dysf.distal LAD 90%,70-75% mid lad just before prev stent,SUSAN to prox.mid LAD, endeavor to distal LAD     ANGIOGRAM  11/13/13    restenosis, stent LAD     C NONSPECIFIC PROCEDURE      Laminectomy x 3 - (1983 x 2 & 1990)     C NONSPECIFIC PROCEDURE Bilateral 1998    Bunionectomy     C NONSPECIFIC PROCEDURE  1959    Gingival surgery at age 9       FAMILY HISTORY:  Family History   Problem Relation Age of Onset     Cancer - colorectal Sister      Thyroid Disease Brother      Cardiovascular Brother      DIABETES Brother      CANCER Father      tumor in chest and throat     Arthritis Mother      Thyroid Disease Mother      DIABETES Mother        SOCIAL HISTORY:  Social History     Social History     Marital status:      Spouse name: N/A     Number of children: N/A     Years of education: N/A     Social History Main Topics      Smoking status: Former Smoker     Packs/day: 1.00     Years: 25.00     Types: Cigarettes     Quit date: 7/25/2005     Smokeless tobacco: Never Used     Alcohol use Yes      Comment: occasional     Drug use: No     Sexual activity: Yes     Partners: Female     Other Topics Concern     Parent/Sibling W/ Cabg, Mi Or Angioplasty Before 65f 55m? Yes     Caffeine Concern No     3 cups daily     Special Diet Yes     watching carb intake     Exercise No     some walking     Social History Narrative         Review of Systems:  Skin:          Eyes:  Negative for      ENT:    hearing loss    Respiratory:  Positive for sleep apnea;shortness of breath     Cardiovascular:    palpitations;chest pain;edema;fatigue;lightheadedness;Positive for pain in neck and jaw  Gastroenterology: Positive for diarrhea    Genitourinary:  not assessed      Musculoskeletal:  Positive for arthritis;back pain    Neurologic:  Positive for numbness or tingling of hands;numbness or tingling of feet;headaches    Psychiatric:  Positive for sleep disturbances    Heme/Lymph/Imm:  Negative      Endocrine:  Positive for thyroid disorder;diabetes      Physical Exam:  Vitals: /78  Pulse 90  Ht 1.829 m (6')  Wt 107 kg (236 lb)  BMI 32.01 kg/m2    Constitutional:  cooperative;alert and oriented;well developed;well nourished        Skin:  warm and dry to the touch        Head:  normocephalic        Eyes:  pupils equal and round;sclera white        ENT:  no pallor or cyanosis        Neck:  JVP normal;no carotid bruit        Chest:  clear to auscultation          Cardiac: regular rhythm;no murmurs, gallops or rubs detected                  Abdomen:  abdomen soft;no masses;non-tender obese      Vascular: pulses full and equal                                        Extremities and Back:  no deformities, clubbing, cyanosis, erythema observed   bilateral LE edema;trace          Neurological:  affect appropriate, oriented to time, person and place;no gross motor  deficits          Recent Lab Results:  LIPID RESULTS:  Lab Results   Component Value Date    CHOL 118 04/21/2017    HDL 51 04/21/2017    LDL 55 04/21/2017    TRIG 59 04/21/2017    CHOLHDLRATIO 2.3 02/02/2015       LIVER ENZYME RESULTS:  Lab Results   Component Value Date    AST 23 11/02/2010    ALT 50 (A) 03/04/2014       CBC RESULTS:  Lab Results   Component Value Date    WBC 4.6 04/26/2017    RBC 4.60 04/26/2017    HGB 13.1 (L) 04/26/2017    HCT 39.1 (L) 04/26/2017    MCV 85 04/26/2017    MCH 28.5 04/26/2017    MCHC 33.5 04/26/2017    RDW 13.0 04/26/2017     04/26/2017       BMP RESULTS:  Lab Results   Component Value Date     04/26/2017    POTASSIUM 3.8 04/26/2017    CHLORIDE 106 04/26/2017    CO2 26 04/26/2017    ANIONGAP 8 04/26/2017    GLC 76 04/26/2017    BUN 15 04/26/2017    CR 0.77 04/26/2017    GFRESTIMATED >90  Non  GFR Calc   04/26/2017    GFRESTBLACK >90   GFR Calc   04/26/2017    BETTIE 8.6 04/26/2017        A1C RESULTS:  Lab Results   Component Value Date    A1C 11.7 (H) 04/22/2017       INR RESULTS:  Lab Results   Component Value Date    INR 1.03 11/03/2010    INR 0.91 02/11/2009           CC  No referring provider defined for this encounter.

## 2017-06-09 NOTE — PROGRESS NOTES
Patient is scheduled for a left heart cath at [facility], on Tuesday (06-13-17).        Prep instructions were reviewed with patient in clinic.     Prep instructions:    -Check-in Time: 9 am  -Procedure Time: 11 am    -Patient should not eat or drink after midnight on Monday (06-12-17) .      -Patient should discuss insulin dosing with his PMD.     -Patient to hold Metformin on the morning of the procedure. Patient will hold Metformin until Thursday (06-15-17).  A BMP is scheduled on Thursday. Results will be reviewed with patient and patient will be informed if he can resume his Metformin.    -Per Dr. Mckeon patient to hold Eliquis x 3 days before the procedure. Last dose should be today. Patient can resume Eliquis after the procedure, as recommended by the cardiologist.     -Patient to bridge with Lovenox 40 mg x 3 days, starting tomorrow. No Lovenox on the day of the procedure. (Script sent)    -Patient will not take ASA due to stroke history.     -Patient should take his Lisinopril, Imdur, and Amlodipine, on the morning of the procedure with small sips of water. Patient stated he takes his Toprol XL at bedtime.     -Patient should have someone available to drive him to the procedure and to drive him home.    -Patient should have someone available to stay with him for at least 24 hours after the procedure.     -I did recommend that he pack an overnight bag just in case he may need to stay overnight.    Patient had no questions about his prep instructions.       Levi RICE  [facility]

## 2017-06-09 NOTE — MR AVS SNAPSHOT
After Visit Summary   6/9/2017    Jayro Elder    MRN: [id number]           Patient Information     Date Of Birth          1950        Visit Information        Provider Department      6/9/2017 12:45 PM Johnathan Mckeon MD [facility] AT [place]        Today's Diagnoses     Coronary artery disease involving native coronary artery of native heart with other form of angina pectoris (H)    -  1    Hypertension goal BP (blood pressure) < 140/90        Cardiomyopathy (H)        Cerebrovascular accident (CVA) due to embolism of left posterior cerebral artery (H)        Type 2 diabetes mellitus with other kidney complication (H)           Follow-ups after your visit        Your next 10 appointments already scheduled     Jun 13, 2017 11:00 AM CDT   Cath 90 Minute with SHCVR3   [facility] Cardiac Catheterization Lab ([facility])    [address]  [place] [address]   [phone]            Jun 13, 2017  2:00 PM CDT   Neuro Treatment with Patsy Martinez, PT   [facility] ([facility])    [address] [facility] [facility] [address] [phone]            Lai 15, 2017  1:45 PM CDT   LAB with RU LAB   [facility] ([facility])    [address]  [facility] [id number] [phone]           Patient must bring picture ID.  Patient should be prepared to give a urine specimen  Please do not eat 10-12 hours before your appointment if you are coming in fasting for labs on lipids, cholesterol, or glucose (sugar).  Pregnant women should follow their Care Team instructions. Water with medications is okay. Do not drink coffee or other fluids.   If you have concerns about taking  your medications, please ask at office or if scheduling via [employer], send a message by clicking on Secure Messaging, Message Your Care Team.            Lai 15, 2017  2:45  PM CDT   Neuro Treatment with Patsy Martinez, PT   [facility] Physical Therapy ([facility])    [address] [facility] [address]   [phone]            Lai 15, 2017  3:30 PM CDT   Neuro Treatment with Chloé Luu, OT   [facility] Occupational Therapy ([facility])    [address] [facility] [address]   [phone]            Jun 19, 2017  1:45 PM CDT   Neuro Treatment with Chloé Luu, OT   [facility] Occupational Therapy ([facility])    [address] [facility] [address]   [phone]            Jun 20, 2017  2:00 PM CDT   Neuro Treatment with Vivian Plunkett, SLP   [facility] Speech Therapy ([facility])    [address] [facility] [address]   [phone]            Jun 20, 2017  3:15 PM CDT   Neuro Treatment with Chloé Luu, OT   [facility] Occupational Therapy ([facility])    [address] [facility] [address]   [phone]            Jun 20, 2017  4:15 PM CDT   Neuro Treatment with Patsy Martinez, PT   [facility] Physical Therapy ([facility])    [address] [facility] [address]   [phone]            Jun 22, 2017  2:15 PM CDT   Neuro Treatment with Patsy Martinez, PT   [facility] Physical Therapy ([facility])    [address] [facility] [address]   [phone]              Future tests that were ordered for you today     Open Future Orders        Priority Expected Expires Ordered    Basic metabolic panel Routine 6/9/2017 6/9/2019 6/9/2017    CBC with platelets Routine 6/9/2017 6/9/2019 6/9/2017    INR Routine 6/9/2017 6/9/2019 6/9/2017    Partial thromboplastin time Routine 6/9/2017 6/9/2019 6/9/2017    Outpatient Coronary Angiography Adult Order Routine 6/14/2017 8/28/2017 6/9/2017            Who to contact     If you have questions or need follow up  "information about today's clinic visit or your schedule please contact [facility] PHYSICIANS HEART AT [place] directly at [phone].  Normal or non-critical lab and imaging results will be communicated to you by MyChart, letter or phone within 4 business days after the clinic has received the results. If you do not hear from us within 7 days, please contact the clinic through [employer] or phone. If you have a critical or abnormal lab result, we will notify you by phone as soon as possible.  Submit refill requests through [facility] or call your pharmacy and they will forward the refill request to us. Please allow 3 business days for your refill to be completed.          Additional Information About Your Visit        [employer] Information     [facility] lets you send messages to your doctor, view your test results, renew your prescriptions, schedule appointments and more. To sign up, go to [email or web address] . Click on \"Log in\" on the left side of the screen, which will take you to the Welcome page. Then click on \"Sign up Now\" on the right side of the page.     You will be asked to enter the access code listed below, as well as some personal information. Please follow the directions to create your username and password.     Your access code is: [email or web address]  Expires: 2017 11:13 AM     Your access code will  in 90 days. If you need help or a new code, please call your [place] clinic or [phone].        Care EveryWhere ID     This is your Care EveryWhere ID. This could be used by other organizations to access your [place] medical records  [id number]        Your Vitals Were     Pulse Height BMI (Body Mass Index)             90 1.829 m (6') 32.01 kg/m2          Blood Pressure from Last 3 Encounters:   17 128/78   17 108/68   17 138/84    Weight from Last 3 Encounters:   17 107 kg (236 lb)   17 104.1 kg (229 lb 6.4 oz)   17 101.6 kg (224 lb)               " Primary Care Provider Office Phone # Fax #    Davion Cordova PA-C [phone]       [facility] [address]  [facility] [address]        Thank you!     Thank you for choosing [facility]YS[facility] AT [place]  for your care. Our goal is always to provide you with excellent care. Hearing back from our patients is one way we can continue to improve our services. Please take a few minutes to complete the written survey that you may receive in the mail after your visit with us. Thank you!             Your Updated Medication List - Protect others around you: Learn how to safely use, store and throw away your medicines at www.disposemymeds.org.          This list is accurate as of: 6/9/17  1:59 PM.  Always use your most recent med list.                   Brand Name Dispense Instructions for use    amLODIPine 5 MG tablet    NORVASC    180 tablet    Take 1 tablet (5 mg) by mouth 2 times daily       Blood Pressure Monitor Kit     1 kit    Use to monitor blood pressure daily or as directed       ELIQUIS PO      Take 2.5 mg by mouth daily       insulin glargine 100 UNIT/ML injection     6 mL    Inject 20 Units Subcutaneous every 24 hours       isosorbide mononitrate 30 MG 24 hr tablet    IMDUR    90 tablet    Take 1 tablet (30 mg) by mouth daily       levothyroxine 137 MCG tablet    SYNTHROID/LEVOTHROID    90 tablet    Take 137 mcg by mouth At Bedtime       lisinopril 20 MG tablet    PRINIVIL/ZESTRIL     Take 20 mg by mouth 2 times daily       metFORMIN 1000 MG tablet    GLUCOPHAGE    60 tablet    Take 1 tablet (1,000 mg) by mouth 2 times daily (with meals)       metoprolol 100 MG 24 hr tablet    TOPROL-XL    90 tablet    Take 1 tablet (100 mg) by mouth At Bedtime       nitroglycerin 0.4 MG sublingual tablet    NITROSTAT    50 tablet    Place 1 tablet (0.4 mg) under the tongue every 5 minutes as needed for chest pain       ONE TOUCH LANCETS Misc     120    use qid        ONE TOUCH ULTRA test strip   Generic drug:  blood glucose monitoring     120    use qid       order for DME     1 each    Equipment being ordered: Cane () Treatment Diagnosis: Impaired balance       pantoprazole 40 MG EC tablet    PROTONIX    30 tablet    Take 1 tablet (40 mg) by mouth every morning (before breakfast)       simvastatin 40 MG tablet    ZOCOR    90 tablet    Take 1 tablet (40 mg) by mouth At Bedtime

## 2017-06-09 NOTE — LETTER
6/9/2017    Davion Cordova PA-C  [address] [facility] [address]    RE: Jayro MESSINA Jael       Dear Colleague,    I had the pleasure of seeing Jayro Elder in the [facility].    INDICATION FOR ADMISSION:  Clinical recurrent angina, dyspnea on exertion, worsening LV systolic function - left heart failure.      Jayro Elder is 66.  He was recently in the hospital because of a stroke associated with a right homonymous hemianopsia and some instability of gait.  Since his stroke, he has not had significant recovery of his vision.  He is doing rehab for the unsteadiness that has followed.  He also feels as if his short-term memory has deteriorated some.      He was in the hospital from 04/20 to 04/26/2017.  He was diagnosed with a left occipital hemorrhagic stroke.  Atrial fibrillation was found to be a new finding.  The stroke was blamed on cardiac to CNS emboli due to his atrial fibrillation.  He was noted to be diabetic.  He has a history of multinodular goiter.  Initially, there was concern about giving him any anticoagulation, but he subsequently has been placed on Eliquis 2.5 mg twice a day.      His stroke was confirmed by CNS imaging.  MR angiography did not reveal high-grade stenosis and an embolic event due to atrial fibrillation was suspected.  He subsequently had no evidence of progressive bleeding.  The atrial fibrillation was controlled and subsequently after a reasonable amount of time, Neurology agreed that it was acceptable to start Eliquis 2.5 mg b.i.d.  His atrial fibrillation was well-controlled on metoprolol and a heart rate controlling strategy was suggested.      Subsequently, he also has had worsening dyspnea and he has had chest tightness going into the neck and jaws, associated with exertion, relieved by rest.  He has a history of known coronary disease, having had serial stents to his LAD with in-stent restenosis.  He had a midvessel  drug-eluting stent in 2005, again in 2009 and again in 2013.      Because of his symptoms, worsening ejection fraction and more shortness of breath, a stress nuclear study was done 06/02 - 1 week ago.  This study demonstrated a large basal lateral area of ischemia, a scar in the anteroseptal apical area.  The ejection fraction was diminished at 30%.  Previously, he has been in the 40%-45% range post-anteroapical, an MI and his previously noted multiple LAD stent interventions.      He is symptomatic with minimal activity.  I have recommended coronary angiography.  The area of ischemia is in the basal lateral wall outside the zone of the LAD which has recurrent stents as noted.      His ejection fraction has deteriorated and further assessment will be needed.  An echocardiogram in April originally showed the EF 35%-40% and the stress nuclear study called it 31%.      PAST MEDICAL HISTORY:   1.  Chronic coronary artery disease.   2.  Ischemic cardiomyopathy.   3.  Multiple stents to the LAD with in-stent restenosis.   4.  Hypertension.   5.  Recent cerebrovascular accident associated with atrial fibrillation.   6.  Recent atrial fibrillation with medical control.   7.  Adult onset diabetes mellitus.   8.  History of remote tobacco but not currently.      CURRENT MEDICATIONS:   1.  Eliquis 2.5 mg b.i.d., which is a lower dose based on his body size and age, etc., but a conservative approach in at least the beginning was felt to be appropriate.   2.  Lisinopril 20 mg b.i.d.   3.  Isosorbide 30 mg a day.   4.  Simvastatin 40 mg at bedtime.   5.  Metoprolol 100 mg at bedtime.   6.  Amlodipine 5 mg b.i.d.   7.  Protonix 40 mg a day.   8.  Metformin 1000 mg b.i.d.   9.  Levothyroxine 137 mcg daily.      After reviewing his medical history, worsening breathing, and recurrent chest pressure going into the neck and jaws, in concert with the abnormal stress nuclear study, coronary angiography was recommended.  I reviewed the  risks and benefits including death, myocardial infarction, stroke, bleeding, urgent surgery, kidney injury, vascular injury and possible urgent bypass surgery.  It will be done at [facility].  Previously, he had Dr. Chinedu Hudson provide his coronary angiogram through the wrist, and he was so pleased with that that he requested Dr. Hudson do it again.  This will be done 06/13/2017.      REVIEW OF SYSTEMS:   PULMONARY:  Positive for sleep apnea and shortness of breath on exertion.   CARDIAC:  As noted above.   GASTROINTESTINAL:  Positive for some loose stools.   GENITOURINARY:  Negative.   MUSCULOSKELETAL:  Positive for chronic back achiness.   NEUROLOGIC:  Positive for some tingling of the hands.   ENDOCRINE:  Positive for thyroid disorder and diabetes.      LABORATORY VALUES:  From his hospitalizations recently, renal function normal, creatinine 0.8, BUN 16, hemoglobin 12.4 grams, and white count 6000.      PHYSICAL EXAMINATION:   GENERAL:  Well-developed, well-nourished male.   VITAL SIGNS:  Blood pressure 128/80, heart rate 80-90 beats a minute in atrial fibrillation.   HEAD AND NECK:  Normal.  Carotids were equal, no bruits heard.   HEART:  Irregularly irregular without gallop or murmur.   LUNGS:  Clear.   ABDOMEN:  Soft without organomegaly.   EXTREMITIES:  Good radial pulses, +2 femoral pulses, no edema noted.      IMPRESSION AND DISCUSSION:  I believe this gentleman has worsening coronary disease, worsening myocardial ischemia, angina with minimal exertion, left heart failure symptoms with minimal exertion, a deterioration in his LVEF to 30%.      He recently had a stroke with right homonymous hemianopsia that was felt to be embolic.  He is currently in atrial fibrillation.      With the Eliquis, we are going to bridge him with Lovenox.  Dr. Hudson can define how quickly to resume the Eliquis and at what dose.  He has been on 2.5 mg twice a day.  At some point in time down the line, I would  increase it to 5 mg twice a day.  His stroke was in 04/2017.      PROGNOSIS:  Good to guarded, but I think he has significant coronary and heart disease.      Over an hour was spent doing the consult, with greater than 50% of the time face-to-face doing education, reviewing multiple old records, recent stress studies, hospitalization, medication side effects, drug effects, cardiac catheterization, risks and benefits, etc.     Again, thank you for allowing me to participate in the care of your patient.      Sincerely,    KALEB GUTIERREZ MD     [facility]    cc:   Kwasi Hudson MD    [facility]   [address]   [place] [address]

## 2017-06-09 NOTE — PROGRESS NOTES
INDICATION FOR ADMISSION:  Clinical recurrent angina, dyspnea on exertion, worsening LV systolic function - left heart failure.      Jayro Elder is 66.  He was recently in the hospital because of a stroke associated with a right homonymous hemianopsia and some instability of gait.  Since his stroke, he has not had significant recovery of his vision.  He is doing rehab for the unsteadiness that has followed.  He also feels as if his short-term memory has deteriorated some.      He was in the hospital from 04/20 to 04/26/2017.  He was diagnosed with a left occipital hemorrhagic stroke.  Atrial fibrillation was found to be a new finding.  The stroke was blamed on cardiac to CNS emboli due to his atrial fibrillation.  He was noted to be diabetic.  He has a history of multinodular goiter.  Initially, there was concern about giving him any anticoagulation, but he subsequently has been placed on Eliquis 2.5 mg twice a day.      His stroke was confirmed by CNS imaging.  MR angiography did not reveal high-grade stenosis and an embolic event due to atrial fibrillation was suspected.  He subsequently had no evidence of progressive bleeding.  The atrial fibrillation was controlled and subsequently after a reasonable amount of time, Neurology agreed that it was acceptable to start Eliquis 2.5 mg b.i.d.  His atrial fibrillation was well-controlled on metoprolol and a heart rate controlling strategy was suggested.      Subsequently, he also has had worsening dyspnea and he has had chest tightness going into the neck and jaws, associated with exertion, relieved by rest.  He has a history of known coronary disease, having had serial stents to his LAD with in-stent restenosis.  He had a midvessel drug-eluting stent in 2005, again in 2009 and again in 2013.      Because of his symptoms, worsening ejection fraction and more shortness of breath, a stress nuclear study was done 06/02 - 1 week ago.  This study demonstrated a large  basal lateral area of ischemia, a scar in the anteroseptal apical area.  The ejection fraction was diminished at 30%.  Previously, he has been in the 40%-45% range post-anteroapical, an MI and his previously noted multiple LAD stent interventions.      He is symptomatic with minimal activity.  I have recommended coronary angiography.  The area of ischemia is in the basal lateral wall outside the zone of the LAD which has recurrent stents as noted.      His ejection fraction has deteriorated and further assessment will be needed.  An echocardiogram in April originally showed the EF 35%-40% and the stress nuclear study called it 31%.      PAST MEDICAL HISTORY:   1.  Chronic coronary artery disease.   2.  Ischemic cardiomyopathy.   3.  Multiple stents to the LAD with in-stent restenosis.   4.  Hypertension.   5.  Recent cerebrovascular accident associated with atrial fibrillation.   6.  Recent atrial fibrillation with medical control.   7.  Adult onset diabetes mellitus.   8.  History of remote tobacco but not currently.      CURRENT MEDICATIONS:   1.  Eliquis 2.5 mg b.i.d., which is a lower dose based on his body size and age, etc., but a conservative approach in at least the beginning was felt to be appropriate.   2.  Lisinopril 20 mg b.i.d.   3.  Isosorbide 30 mg a day.   4.  Simvastatin 40 mg at bedtime.   5.  Metoprolol 100 mg at bedtime.   6.  Amlodipine 5 mg b.i.d.   7.  Protonix 40 mg a day.   8.  Metformin 1000 mg b.i.d.   9.  Levothyroxine 137 mcg daily.      After reviewing his medical history, worsening breathing, and recurrent chest pressure going into the neck and jaws, in concert with the abnormal stress nuclear study, coronary angiography was recommended.  I reviewed the risks and benefits including death, myocardial infarction, stroke, bleeding, urgent surgery, kidney injury, vascular injury and possible urgent bypass surgery.  It will be done at [facility].  Previously, he had   Chinedu Hudson provide his coronary angiogram through the wrist, and he was so pleased with that that he requested Dr. Hudson do it again.  This will be done 06/13/2017.      REVIEW OF SYSTEMS:   PULMONARY:  Positive for sleep apnea and shortness of breath on exertion.   CARDIAC:  As noted above.   GASTROINTESTINAL:  Positive for some loose stools.   GENITOURINARY:  Negative.   MUSCULOSKELETAL:  Positive for chronic back achiness.   NEUROLOGIC:  Positive for some tingling of the hands.   ENDOCRINE:  Positive for thyroid disorder and diabetes.      LABORATORY VALUES:  From his hospitalizations recently, renal function normal, creatinine 0.8, BUN 16, hemoglobin 12.4 grams, and white count 6000.      PHYSICAL EXAMINATION:   GENERAL:  Well-developed, well-nourished male.   VITAL SIGNS:  Blood pressure 128/80, heart rate 80-90 beats a minute in atrial fibrillation.   HEAD AND NECK:  Normal.  Carotids were equal, no bruits heard.   HEART:  Irregularly irregular without gallop or murmur.   LUNGS:  Clear.   ABDOMEN:  Soft without organomegaly.   EXTREMITIES:  Good radial pulses, +2 femoral pulses, no edema noted.      IMPRESSION AND DISCUSSION:  I believe this gentleman has worsening coronary disease, worsening myocardial ischemia, angina with minimal exertion, left heart failure symptoms with minimal exertion, a deterioration in his LVEF to 30%.      He recently had a stroke with right homonymous hemianopsia that was felt to be embolic.  He is currently in atrial fibrillation.      With the Eliquis, we are going to bridge him with Lovenox.  Dr. Hudson can define how quickly to resume the Eliquis and at what dose.  He has been on 2.5 mg twice a day.  At some point in time down the line, I would increase it to 5 mg twice a day.  His stroke was in 04/2017.      PROGNOSIS:  Good to guarded, but I think he has significant coronary and heart disease.      Over an hour was spent doing the consult, with greater than 50% of the  time face-to-face doing education, reviewing multiple old records, recent stress studies, hospitalization, medication side effects, drug effects, cardiac catheterization, risks and benefits, etc.      cc:   Kwasi Hudson MD   [facility]   [address] [facility] [address]   [place] [address]         KALEB GUTIERREZ MD, [facility]             D: 2017 15:59   T: 2017 17:18   MT: al      Name:     PORTER YANCEY   MRN:      [id number]        Account:      [id number]   :      1950           Service Date: 2017      Document: [id number]

## 2017-06-12 DIAGNOSIS — I25.118 CORONARY ARTERY DISEASE INVOLVING NATIVE CORONARY ARTERY OF NATIVE HEART WITH OTHER FORM OF ANGINA PECTORIS (H): Primary | ICD-10-CM

## 2017-06-12 RX ORDER — LIDOCAINE 40 MG/G
CREAM TOPICAL
Status: CANCELLED | OUTPATIENT
Start: 2017-06-13

## 2017-06-12 RX ORDER — POTASSIUM CHLORIDE 750 MG/1
20 TABLET, EXTENDED RELEASE ORAL
Status: CANCELLED | OUTPATIENT
Start: 2017-06-13

## 2017-06-12 RX ORDER — SODIUM CHLORIDE 9 MG/ML
INJECTION, SOLUTION INTRAVENOUS CONTINUOUS
Status: CANCELLED | OUTPATIENT
Start: 2017-06-13

## 2017-06-13 ENCOUNTER — HOSPITAL ENCOUNTER (OUTPATIENT)
Facility: CLINIC | Age: 67
Discharge: HOME | End: 2017-06-13
Attending: INTERNAL MEDICINE | Admitting: INTERNAL MEDICINE
Payer: COMMERCIAL

## 2017-06-13 ENCOUNTER — APPOINTMENT (OUTPATIENT)
Dept: CARDIOLOGY | Facility: CLINIC | Age: 67
End: 2017-06-13
Attending: INTERNAL MEDICINE
Payer: COMMERCIAL

## 2017-06-13 VITALS
OXYGEN SATURATION: 96 % | RESPIRATION RATE: 16 BRPM | SYSTOLIC BLOOD PRESSURE: 124 MMHG | TEMPERATURE: 97.7 F | DIASTOLIC BLOOD PRESSURE: 62 MMHG | WEIGHT: 231 LBS | BODY MASS INDEX: 28.13 KG/M2 | HEIGHT: 76 IN | HEART RATE: 57 BPM

## 2017-06-13 DIAGNOSIS — I25.118 CORONARY ARTERY DISEASE INVOLVING NATIVE CORONARY ARTERY OF NATIVE HEART WITH OTHER FORM OF ANGINA PECTORIS (H): ICD-10-CM

## 2017-06-13 DIAGNOSIS — E11.29 TYPE 2 DIABETES MELLITUS WITH OTHER KIDNEY COMPLICATION: ICD-10-CM

## 2017-06-13 DIAGNOSIS — I10 HYPERTENSION GOAL BP (BLOOD PRESSURE) < 140/90: ICD-10-CM

## 2017-06-13 DIAGNOSIS — I63.432 CEREBROVASCULAR ACCIDENT (CVA) DUE TO EMBOLISM OF LEFT POSTERIOR CEREBRAL ARTERY (H): ICD-10-CM

## 2017-06-13 DIAGNOSIS — I42.9 CARDIOMYOPATHY (H): ICD-10-CM

## 2017-06-13 PROBLEM — Z98.890 STATUS POST CORONARY ANGIOGRAM: Status: ACTIVE | Noted: 2017-06-13

## 2017-06-13 LAB
GLUCOSE BLDC GLUCOMTR-MCNC: 102 MG/DL (ref 70–99)
KCT BLD-ACNC: 202 SEC (ref 105–167)

## 2017-06-13 PROCEDURE — 25000128 H RX IP 250 OP 636: Performed by: INTERNAL MEDICINE

## 2017-06-13 PROCEDURE — 99152 MOD SED SAME PHYS/QHP 5/>YRS: CPT | Mod: GC | Performed by: INTERNAL MEDICINE

## 2017-06-13 PROCEDURE — 27210759 ZZH DEVICE INFLATION CR6

## 2017-06-13 PROCEDURE — 93454 CORONARY ARTERY ANGIO S&I: CPT | Mod: 26 | Performed by: INTERNAL MEDICINE

## 2017-06-13 PROCEDURE — C1769 GUIDE WIRE: HCPCS

## 2017-06-13 PROCEDURE — 027034Z DILATION OF CORONARY ARTERY, ONE ARTERY WITH DRUG-ELUTING INTRALUMINAL DEVICE, PERCUTANEOUS APPROACH: ICD-10-PCS | Performed by: INTERNAL MEDICINE

## 2017-06-13 PROCEDURE — 27210787 ZZH MANIFOLD CR2

## 2017-06-13 PROCEDURE — 99153 MOD SED SAME PHYS/QHP EA: CPT | Mod: GC | Performed by: INTERNAL MEDICINE

## 2017-06-13 PROCEDURE — 25000132 ZZH RX MED GY IP 250 OP 250 PS 637: Performed by: INTERNAL MEDICINE

## 2017-06-13 PROCEDURE — 40000852 ZZH STATISTIC HEART CATH LAB OR EP LAB

## 2017-06-13 PROCEDURE — 82962 GLUCOSE BLOOD TEST: CPT

## 2017-06-13 PROCEDURE — 27210946 ZZH KIT HC TOTES DISP CR8

## 2017-06-13 PROCEDURE — C9600 PERC DRUG-EL COR STENT SING: HCPCS

## 2017-06-13 PROCEDURE — 25000132 ZZH RX MED GY IP 250 OP 250 PS 637

## 2017-06-13 PROCEDURE — C1874 STENT, COATED/COV W/DEL SYS: HCPCS

## 2017-06-13 PROCEDURE — C1725 CATH, TRANSLUMIN NON-LASER: HCPCS

## 2017-06-13 PROCEDURE — 27210892 ZZH CATH CR4

## 2017-06-13 PROCEDURE — 27210914 ZZH SHEATH CR8

## 2017-06-13 PROCEDURE — 25000125 ZZHC RX 250: Performed by: INTERNAL MEDICINE

## 2017-06-13 PROCEDURE — B2111ZZ FLUOROSCOPY OF MULTIPLE CORONARY ARTERIES USING LOW OSMOLAR CONTRAST: ICD-10-PCS | Performed by: INTERNAL MEDICINE

## 2017-06-13 PROCEDURE — 40000065 ZZH STATISTIC EKG NON-CHARGEABLE

## 2017-06-13 PROCEDURE — 99152 MOD SED SAME PHYS/QHP 5/>YRS: CPT

## 2017-06-13 PROCEDURE — C1887 CATHETER, GUIDING: HCPCS

## 2017-06-13 PROCEDURE — 92928 PRQ TCAT PLMT NTRAC ST 1 LES: CPT | Mod: LC | Performed by: INTERNAL MEDICINE

## 2017-06-13 PROCEDURE — 93454 CORONARY ARTERY ANGIO S&I: CPT

## 2017-06-13 PROCEDURE — 99153 MOD SED SAME PHYS/QHP EA: CPT

## 2017-06-13 PROCEDURE — 27211089 ZZH KIT ACIST INJECTOR CR3

## 2017-06-13 PROCEDURE — 27210795 ZZH PAD DEFIB QUICK CR4

## 2017-06-13 PROCEDURE — 93010 ELECTROCARDIOGRAM REPORT: CPT | Mod: 76 | Performed by: INTERNAL MEDICINE

## 2017-06-13 PROCEDURE — 85347 COAGULATION TIME ACTIVATED: CPT

## 2017-06-13 PROCEDURE — 93005 ELECTROCARDIOGRAM TRACING: CPT

## 2017-06-13 RX ORDER — PROMETHAZINE HYDROCHLORIDE 25 MG/ML
6.25-25 INJECTION, SOLUTION INTRAMUSCULAR; INTRAVENOUS EVERY 4 HOURS PRN
Status: DISCONTINUED | OUTPATIENT
Start: 2017-06-13 | End: 2017-06-13 | Stop reason: HOSPADM

## 2017-06-13 RX ORDER — NITROGLYCERIN 20 MG/100ML
.07-2 INJECTION INTRAVENOUS CONTINUOUS PRN
Status: DISCONTINUED | OUTPATIENT
Start: 2017-06-13 | End: 2017-06-13 | Stop reason: HOSPADM

## 2017-06-13 RX ORDER — NITROGLYCERIN 0.4 MG/1
0.4 TABLET SUBLINGUAL EVERY 5 MIN PRN
Status: DISCONTINUED | OUTPATIENT
Start: 2017-06-13 | End: 2017-06-13 | Stop reason: HOSPADM

## 2017-06-13 RX ORDER — ADENOSINE 3 MG/ML
12-12000 INJECTION, SOLUTION INTRAVENOUS
Status: DISCONTINUED | OUTPATIENT
Start: 2017-06-13 | End: 2017-06-13 | Stop reason: HOSPADM

## 2017-06-13 RX ORDER — DOBUTAMINE HYDROCHLORIDE 200 MG/100ML
2-20 INJECTION INTRAVENOUS CONTINUOUS PRN
Status: DISCONTINUED | OUTPATIENT
Start: 2017-06-13 | End: 2017-06-13 | Stop reason: HOSPADM

## 2017-06-13 RX ORDER — NIFEDIPINE 10 MG/1
10 CAPSULE ORAL
Status: DISCONTINUED | OUTPATIENT
Start: 2017-06-13 | End: 2017-06-13 | Stop reason: HOSPADM

## 2017-06-13 RX ORDER — FLUMAZENIL 0.1 MG/ML
0.2 INJECTION, SOLUTION INTRAVENOUS
Status: DISCONTINUED | OUTPATIENT
Start: 2017-06-13 | End: 2017-06-13 | Stop reason: HOSPADM

## 2017-06-13 RX ORDER — CLOPIDOGREL BISULFATE 75 MG/1
75 TABLET ORAL ONCE
Status: DISCONTINUED | OUTPATIENT
Start: 2017-06-14 | End: 2017-06-13 | Stop reason: HOSPADM

## 2017-06-13 RX ORDER — ASPIRIN 81 MG/1
81-324 TABLET, CHEWABLE ORAL
Status: DISCONTINUED | OUTPATIENT
Start: 2017-06-13 | End: 2017-06-13 | Stop reason: HOSPADM

## 2017-06-13 RX ORDER — MORPHINE SULFATE 2 MG/ML
1-2 INJECTION, SOLUTION INTRAMUSCULAR; INTRAVENOUS EVERY 5 MIN PRN
Status: DISCONTINUED | OUTPATIENT
Start: 2017-06-13 | End: 2017-06-13 | Stop reason: HOSPADM

## 2017-06-13 RX ORDER — LIDOCAINE HYDROCHLORIDE 10 MG/ML
30 INJECTION, SOLUTION EPIDURAL; INFILTRATION; INTRACAUDAL; PERINEURAL
Status: DISCONTINUED | OUTPATIENT
Start: 2017-06-13 | End: 2017-06-13 | Stop reason: HOSPADM

## 2017-06-13 RX ORDER — HYDRALAZINE HYDROCHLORIDE 20 MG/ML
10-20 INJECTION INTRAMUSCULAR; INTRAVENOUS
Status: DISCONTINUED | OUTPATIENT
Start: 2017-06-13 | End: 2017-06-13 | Stop reason: HOSPADM

## 2017-06-13 RX ORDER — BUPIVACAINE HYDROCHLORIDE 2.5 MG/ML
1-10 INJECTION, SOLUTION EPIDURAL; INFILTRATION; INTRACAUDAL
Status: DISCONTINUED | OUTPATIENT
Start: 2017-06-13 | End: 2017-06-13 | Stop reason: HOSPADM

## 2017-06-13 RX ORDER — PRASUGREL 10 MG/1
10-60 TABLET, FILM COATED ORAL
Status: DISCONTINUED | OUTPATIENT
Start: 2017-06-13 | End: 2017-06-13 | Stop reason: HOSPADM

## 2017-06-13 RX ORDER — PROTAMINE SULFATE 10 MG/ML
1-5 INJECTION, SOLUTION INTRAVENOUS
Status: DISCONTINUED | OUTPATIENT
Start: 2017-06-13 | End: 2017-06-13 | Stop reason: HOSPADM

## 2017-06-13 RX ORDER — ONDANSETRON 2 MG/ML
4 INJECTION INTRAMUSCULAR; INTRAVENOUS EVERY 4 HOURS PRN
Status: DISCONTINUED | OUTPATIENT
Start: 2017-06-13 | End: 2017-06-13 | Stop reason: HOSPADM

## 2017-06-13 RX ORDER — PROTAMINE SULFATE 10 MG/ML
25-100 INJECTION, SOLUTION INTRAVENOUS EVERY 5 MIN PRN
Status: DISCONTINUED | OUTPATIENT
Start: 2017-06-13 | End: 2017-06-13 | Stop reason: HOSPADM

## 2017-06-13 RX ORDER — LIDOCAINE 40 MG/G
CREAM TOPICAL
Status: DISCONTINUED | OUTPATIENT
Start: 2017-06-13 | End: 2017-06-13 | Stop reason: HOSPADM

## 2017-06-13 RX ORDER — LORAZEPAM 2 MG/ML
.5-2 INJECTION INTRAMUSCULAR EVERY 4 HOURS PRN
Status: DISCONTINUED | OUTPATIENT
Start: 2017-06-13 | End: 2017-06-13 | Stop reason: HOSPADM

## 2017-06-13 RX ORDER — HYDROCODONE BITARTRATE AND ACETAMINOPHEN 5; 325 MG/1; MG/1
1-2 TABLET ORAL EVERY 4 HOURS PRN
Status: DISCONTINUED | OUTPATIENT
Start: 2017-06-13 | End: 2017-06-13 | Stop reason: HOSPADM

## 2017-06-13 RX ORDER — FENTANYL CITRATE 50 UG/ML
25-50 INJECTION, SOLUTION INTRAMUSCULAR; INTRAVENOUS
Status: DISCONTINUED | OUTPATIENT
Start: 2017-06-13 | End: 2017-06-13 | Stop reason: HOSPADM

## 2017-06-13 RX ORDER — NICARDIPINE HYDROCHLORIDE 2.5 MG/ML
100 INJECTION INTRAVENOUS
Status: DISCONTINUED | OUTPATIENT
Start: 2017-06-13 | End: 2017-06-13 | Stop reason: HOSPADM

## 2017-06-13 RX ORDER — HEPARIN SODIUM 1000 [USP'U]/ML
1000-10000 INJECTION, SOLUTION INTRAVENOUS; SUBCUTANEOUS EVERY 5 MIN PRN
Status: DISCONTINUED | OUTPATIENT
Start: 2017-06-13 | End: 2017-06-13 | Stop reason: HOSPADM

## 2017-06-13 RX ORDER — PHENYLEPHRINE HCL IN 0.9% NACL 1 MG/10 ML
20-100 SYRINGE (ML) INTRAVENOUS
Status: DISCONTINUED | OUTPATIENT
Start: 2017-06-13 | End: 2017-06-13 | Stop reason: HOSPADM

## 2017-06-13 RX ORDER — POTASSIUM CHLORIDE 7.45 MG/ML
10 INJECTION INTRAVENOUS
Status: DISCONTINUED | OUTPATIENT
Start: 2017-06-13 | End: 2017-06-13 | Stop reason: HOSPADM

## 2017-06-13 RX ORDER — LIDOCAINE HYDROCHLORIDE 10 MG/ML
1-10 INJECTION, SOLUTION EPIDURAL; INFILTRATION; INTRACAUDAL; PERINEURAL
Status: COMPLETED | OUTPATIENT
Start: 2017-06-13 | End: 2017-06-13

## 2017-06-13 RX ORDER — SODIUM CHLORIDE 9 MG/ML
INJECTION, SOLUTION INTRAVENOUS CONTINUOUS
Status: DISCONTINUED | OUTPATIENT
Start: 2017-06-13 | End: 2017-06-13 | Stop reason: HOSPADM

## 2017-06-13 RX ORDER — NITROGLYCERIN 5 MG/ML
100-200 VIAL (ML) INTRAVENOUS
Status: DISCONTINUED | OUTPATIENT
Start: 2017-06-13 | End: 2017-06-13 | Stop reason: HOSPADM

## 2017-06-13 RX ORDER — ACETAMINOPHEN 325 MG/1
325-650 TABLET ORAL EVERY 4 HOURS PRN
Status: DISCONTINUED | OUTPATIENT
Start: 2017-06-13 | End: 2017-06-13 | Stop reason: HOSPADM

## 2017-06-13 RX ORDER — CLOPIDOGREL BISULFATE 75 MG/1
75 TABLET ORAL
Status: DISCONTINUED | OUTPATIENT
Start: 2017-06-13 | End: 2017-06-13 | Stop reason: HOSPADM

## 2017-06-13 RX ORDER — NALOXONE HYDROCHLORIDE 0.4 MG/ML
.1-.4 INJECTION, SOLUTION INTRAMUSCULAR; INTRAVENOUS; SUBCUTANEOUS
Status: DISCONTINUED | OUTPATIENT
Start: 2017-06-13 | End: 2017-06-13 | Stop reason: HOSPADM

## 2017-06-13 RX ORDER — IOPAMIDOL 755 MG/ML
180 INJECTION, SOLUTION INTRAVASCULAR ONCE
Status: COMPLETED | OUTPATIENT
Start: 2017-06-13 | End: 2017-06-13

## 2017-06-13 RX ORDER — EPTIFIBATIDE 2 MG/ML
2 INJECTION, SOLUTION INTRAVENOUS CONTINUOUS PRN
Status: DISCONTINUED | OUTPATIENT
Start: 2017-06-13 | End: 2017-06-13 | Stop reason: HOSPADM

## 2017-06-13 RX ORDER — EPTIFIBATIDE 2 MG/ML
180 INJECTION, SOLUTION INTRAVENOUS EVERY 10 MIN PRN
Status: DISCONTINUED | OUTPATIENT
Start: 2017-06-13 | End: 2017-06-13 | Stop reason: HOSPADM

## 2017-06-13 RX ORDER — CLOPIDOGREL BISULFATE 75 MG/1
300-600 TABLET ORAL
Status: COMPLETED | OUTPATIENT
Start: 2017-06-13 | End: 2017-06-13

## 2017-06-13 RX ORDER — ASPIRIN 81 MG/1
81 TABLET ORAL DAILY
Status: DISCONTINUED | OUTPATIENT
Start: 2017-06-13 | End: 2017-06-13 | Stop reason: HOSPADM

## 2017-06-13 RX ORDER — DEXTROSE MONOHYDRATE 25 G/50ML
12.5-5 INJECTION, SOLUTION INTRAVENOUS EVERY 30 MIN PRN
Status: DISCONTINUED | OUTPATIENT
Start: 2017-06-13 | End: 2017-06-13 | Stop reason: HOSPADM

## 2017-06-13 RX ORDER — POTASSIUM CHLORIDE 1500 MG/1
20 TABLET, EXTENDED RELEASE ORAL
Status: DISCONTINUED | OUTPATIENT
Start: 2017-06-13 | End: 2017-06-13 | Stop reason: HOSPADM

## 2017-06-13 RX ORDER — ATROPINE SULFATE 0.1 MG/ML
.5-1 INJECTION INTRAVENOUS
Status: DISCONTINUED | OUTPATIENT
Start: 2017-06-13 | End: 2017-06-13 | Stop reason: HOSPADM

## 2017-06-13 RX ORDER — ATROPINE SULFATE 0.1 MG/ML
0.5 INJECTION INTRAVENOUS EVERY 5 MIN PRN
Status: DISCONTINUED | OUTPATIENT
Start: 2017-06-13 | End: 2017-06-13 | Stop reason: HOSPADM

## 2017-06-13 RX ORDER — CLOPIDOGREL BISULFATE 75 MG/1
75 TABLET ORAL DAILY
Qty: 90 TABLET | Refills: 3 | Status: SHIPPED | OUTPATIENT
Start: 2017-06-14 | End: 2017-09-13

## 2017-06-13 RX ORDER — NITROGLYCERIN 5 MG/ML
100-500 VIAL (ML) INTRAVENOUS
Status: COMPLETED | OUTPATIENT
Start: 2017-06-13 | End: 2017-06-13

## 2017-06-13 RX ORDER — METOPROLOL TARTRATE 1 MG/ML
5 INJECTION, SOLUTION INTRAVENOUS EVERY 5 MIN PRN
Status: DISCONTINUED | OUTPATIENT
Start: 2017-06-13 | End: 2017-06-13 | Stop reason: HOSPADM

## 2017-06-13 RX ORDER — NALOXONE HYDROCHLORIDE 0.4 MG/ML
.2-.4 INJECTION, SOLUTION INTRAMUSCULAR; INTRAVENOUS; SUBCUTANEOUS
Status: DISCONTINUED | OUTPATIENT
Start: 2017-06-13 | End: 2017-06-13 | Stop reason: HOSPADM

## 2017-06-13 RX ORDER — ENALAPRILAT 1.25 MG/ML
1.25-2.5 INJECTION INTRAVENOUS
Status: DISCONTINUED | OUTPATIENT
Start: 2017-06-13 | End: 2017-06-13 | Stop reason: HOSPADM

## 2017-06-13 RX ORDER — DOPAMINE HYDROCHLORIDE 160 MG/100ML
2-20 INJECTION, SOLUTION INTRAVENOUS CONTINUOUS PRN
Status: DISCONTINUED | OUTPATIENT
Start: 2017-06-13 | End: 2017-06-13 | Stop reason: HOSPADM

## 2017-06-13 RX ORDER — VERAPAMIL HYDROCHLORIDE 2.5 MG/ML
1-2.5 INJECTION, SOLUTION INTRAVENOUS
Status: COMPLETED | OUTPATIENT
Start: 2017-06-13 | End: 2017-06-13

## 2017-06-13 RX ORDER — ASPIRIN 325 MG
325 TABLET ORAL
Status: DISCONTINUED | OUTPATIENT
Start: 2017-06-13 | End: 2017-06-13 | Stop reason: HOSPADM

## 2017-06-13 RX ORDER — DIPHENHYDRAMINE HYDROCHLORIDE 50 MG/ML
25-50 INJECTION INTRAMUSCULAR; INTRAVENOUS
Status: DISCONTINUED | OUTPATIENT
Start: 2017-06-13 | End: 2017-06-13 | Stop reason: HOSPADM

## 2017-06-13 RX ORDER — NALOXONE HYDROCHLORIDE 0.4 MG/ML
0.4 INJECTION, SOLUTION INTRAMUSCULAR; INTRAVENOUS; SUBCUTANEOUS EVERY 5 MIN PRN
Status: DISCONTINUED | OUTPATIENT
Start: 2017-06-13 | End: 2017-06-13 | Stop reason: HOSPADM

## 2017-06-13 RX ORDER — METHYLPREDNISOLONE SODIUM SUCCINATE 125 MG/2ML
125 INJECTION, POWDER, LYOPHILIZED, FOR SOLUTION INTRAMUSCULAR; INTRAVENOUS
Status: DISCONTINUED | OUTPATIENT
Start: 2017-06-13 | End: 2017-06-13 | Stop reason: HOSPADM

## 2017-06-13 RX ORDER — FUROSEMIDE 10 MG/ML
20-100 INJECTION INTRAMUSCULAR; INTRAVENOUS
Status: DISCONTINUED | OUTPATIENT
Start: 2017-06-13 | End: 2017-06-13 | Stop reason: HOSPADM

## 2017-06-13 RX ORDER — SODIUM NITROPRUSSIDE 25 MG/ML
100-200 INJECTION INTRAVENOUS
Status: DISCONTINUED | OUTPATIENT
Start: 2017-06-13 | End: 2017-06-13 | Stop reason: HOSPADM

## 2017-06-13 RX ORDER — POTASSIUM CHLORIDE 29.8 MG/ML
20 INJECTION INTRAVENOUS
Status: DISCONTINUED | OUTPATIENT
Start: 2017-06-13 | End: 2017-06-13 | Stop reason: HOSPADM

## 2017-06-13 RX ADMIN — FENTANYL CITRATE 50 MCG: 50 INJECTION, SOLUTION INTRAMUSCULAR; INTRAVENOUS at 13:29

## 2017-06-13 RX ADMIN — IOPAMIDOL 180 ML: 755 INJECTION, SOLUTION INTRAVASCULAR at 14:30

## 2017-06-13 RX ADMIN — FENTANYL CITRATE 25 MCG: 50 INJECTION, SOLUTION INTRAMUSCULAR; INTRAVENOUS at 13:41

## 2017-06-13 RX ADMIN — FENTANYL CITRATE 25 MCG: 50 INJECTION, SOLUTION INTRAMUSCULAR; INTRAVENOUS at 13:36

## 2017-06-13 RX ADMIN — ASPIRIN 81 MG: 81 TABLET, COATED ORAL at 15:37

## 2017-06-13 RX ADMIN — NITROGLYCERIN 400 MCG: 5 INJECTION, SOLUTION INTRAVENOUS at 13:38

## 2017-06-13 RX ADMIN — LIDOCAINE HYDROCHLORIDE 20 MG: 10 INJECTION, SOLUTION EPIDURAL; INFILTRATION; INTRACAUDAL; PERINEURAL at 13:36

## 2017-06-13 RX ADMIN — NITROGLYCERIN 200 MCG: 5 INJECTION, SOLUTION INTRAVENOUS at 14:06

## 2017-06-13 RX ADMIN — FENTANYL CITRATE 25 MCG: 50 INJECTION, SOLUTION INTRAMUSCULAR; INTRAVENOUS at 13:52

## 2017-06-13 RX ADMIN — MIDAZOLAM HYDROCHLORIDE 0.5 MG: 1 INJECTION, SOLUTION INTRAMUSCULAR; INTRAVENOUS at 14:04

## 2017-06-13 RX ADMIN — MIDAZOLAM HYDROCHLORIDE 0.5 MG: 1 INJECTION, SOLUTION INTRAMUSCULAR; INTRAVENOUS at 13:52

## 2017-06-13 RX ADMIN — Medication 5000 UNITS: at 13:48

## 2017-06-13 RX ADMIN — Medication 5000 UNITS: at 14:02

## 2017-06-13 RX ADMIN — CLOPIDOGREL BISULFATE 600 MG: 75 TABLET ORAL at 13:49

## 2017-06-13 RX ADMIN — VERAPAMIL HYDROCHLORIDE 2.5 MG: 2.5 INJECTION, SOLUTION INTRAVENOUS at 13:38

## 2017-06-13 RX ADMIN — SODIUM CHLORIDE: 9 INJECTION, SOLUTION INTRAVENOUS at 10:00

## 2017-06-13 RX ADMIN — MIDAZOLAM HYDROCHLORIDE 0.5 MG: 1 INJECTION, SOLUTION INTRAMUSCULAR; INTRAVENOUS at 13:41

## 2017-06-13 RX ADMIN — FENTANYL CITRATE 25 MCG: 50 INJECTION, SOLUTION INTRAMUSCULAR; INTRAVENOUS at 14:04

## 2017-06-13 RX ADMIN — MIDAZOLAM HYDROCHLORIDE 0.5 MG: 1 INJECTION, SOLUTION INTRAMUSCULAR; INTRAVENOUS at 13:36

## 2017-06-13 RX ADMIN — Medication 5000 UNITS: at 13:38

## 2017-06-13 RX ADMIN — MIDAZOLAM HYDROCHLORIDE 1 MG: 1 INJECTION, SOLUTION INTRAMUSCULAR; INTRAVENOUS at 13:29

## 2017-06-13 NOTE — IP AVS SNAPSHOT
[place] [address]    [place] [address]    Phone:  [phone]                                       After Visit Summary   6/13/2017    Jayro Elder    MRN: [id number]           After Visit Summary Signature Page     I have received my discharge instructions, and my questions have been answered. I have discussed any challenges I see with this plan with the nurse or doctor.    ..........................................................................................................................................  Patient/Patient Representative Signature      ..........................................................................................................................................  Patient Representative Print Name and Relationship to Patient    ..................................................               ................................................  Date                                            Time    ..........................................................................................................................................  Reviewed by Signature/Title    ...................................................              ..............................................  Date                                                            Time

## 2017-06-13 NOTE — IP AVS SNAPSHOT
MRN:[id number]                      After Visit Summary   6/13/2017    Jayro Elder    MRN: [id number]           Visit Information        Department      6/13/2017  9:14 AM [facility]          Review of your medicines      START taking        Dose / Directions    clopidogrel 75 MG tablet   Commonly known as:  PLAVIX   Used for:  Coronary artery disease involving native coronary artery of native heart with other form of angina pectoris (H)        Dose:  75 mg   Start taking on:  6/14/2017   Take 1 tablet (75 mg) by mouth daily   Quantity:  90 tablet   Refills:  3         CONTINUE these medicines which have NOT CHANGED        Dose / Directions    amLODIPine 5 MG tablet   Commonly known as:  NORVASC   Used for:  Coronary artery disease involving native coronary artery of native heart with angina pectoris (H)        Dose:  5 mg   Take 1 tablet (5 mg) by mouth 2 times daily   Quantity:  180 tablet   Refills:  3       Blood Pressure Monitor Kit   Used for:  Hypertension goal BP (blood pressure) < 140/90        Use to monitor blood pressure daily or as directed   Quantity:  1 kit   Refills:  0       D3 ADULT PO        Dose:  2000 Units   Take 2,000 Units by mouth daily   Refills:  0       ELIQUIS PO        Dose:  2.5 mg   Take 2.5 mg by mouth daily   Refills:  0       enoxaparin 40 MG/0.4ML injection   Commonly known as:  LOVENOX   Used for:  Abnormal stress test        Dose:  40 mg   Inject 0.4 mLs (40 mg) Subcutaneous See Admin Instructions   Quantity:  3 Syringe   Refills:  0       insulin glargine 100 UNIT/ML injection   Used for:  Type 2 diabetes mellitus with other kidney complication (H)        Dose:  20 Units   Inject 20 Units Subcutaneous every 24 hours   Quantity:  6 mL   Refills:  0       isosorbide mononitrate 30 MG 24 hr tablet   Commonly known as:  IMDUR   Used for:  Hypertension goal BP (blood pressure) < 140/90        Dose:  30 mg   Take 1 tablet (30 mg) by mouth  daily   Quantity:  90 tablet   Refills:  3       levothyroxine 137 MCG tablet   Commonly known as:  SYNTHROID/LEVOTHROID        Dose:  137 mcg   Take 137 mcg by mouth At Bedtime   Quantity:  90 tablet   Refills:  3       lisinopril 20 MG tablet   Commonly known as:  PRINIVIL/ZESTRIL        Dose:  20 mg   Take 20 mg by mouth 2 times daily   Refills:  0       metFORMIN 1000 MG tablet   Commonly known as:  GLUCOPHAGE   Used for:  Type II or unspecified type diabetes mellitus without mention of complication, not stated as uncontrolled        Dose:  1000 mg   Take 1 tablet (1,000 mg) by mouth 2 times daily (with meals)   Quantity:  60 tablet   Refills:  0       metoprolol 100 MG 24 hr tablet   Commonly known as:  TOPROL-XL   Used for:  Coronary artery disease involving native coronary artery of native heart without angina pectoris        Dose:  100 mg   Take 1 tablet (100 mg) by mouth At Bedtime   Quantity:  90 tablet   Refills:  3       nitroglycerin 0.4 MG sublingual tablet   Commonly known as:  NITROSTAT   Used for:  Other chest pain        Dose:  0.4 mg   Place 1 tablet (0.4 mg) under the tongue every 5 minutes as needed for chest pain   Quantity:  50 tablet   Refills:  0       ONE TOUCH LANCETS Misc        use qid   Quantity:  120   Refills:  4       ONE TOUCH ULTRA test strip   Used for:  Type II or unspecified type diabetes mellitus without mention of complication, not stated as uncontrolled   Generic drug:  blood glucose monitoring        use qid   Quantity:  120   Refills:  11       order for DME   Used for:  Hemorrhagic stroke (H)        Equipment being ordered: Cane () Treatment Diagnosis: Impaired balance   Quantity:  1 each   Refills:  0       pantoprazole 40 MG EC tablet   Commonly known as:  PROTONIX   Used for:  GERD (gastroesophageal reflux disease)        Dose:  40 mg   Take 1 tablet (40 mg) by mouth every morning (before breakfast)   Quantity:  30 tablet   Refills:  0       simvastatin 40 MG  tablet   Commonly known as:  ZOCOR   Used for:  Coronary artery disease involving native coronary artery of native heart without angina pectoris        Dose:  40 mg   Take 1 tablet (40 mg) by mouth At Bedtime   Quantity:  90 tablet   Refills:  3            Where to get your medicines      Some of these will need a paper prescription and others can be bought over the counter. Ask your nurse if you have questions.     Bring a paper prescription for each of these medications     clopidogrel 75 MG tablet               Prescriptions were sent or printed at these locations (1 Prescription)                   [id number] - [place], [place] - [address]   [address], [facility] [address]    Telephone:  [phone]   Fax:  [phone]   Hours:                  Printed at Department/Unit printer (1 of 1)         clopidogrel (PLAVIX) 75 MG tablet                 Protect others around you: Learn how to safely use, store and throw away your medicines at www.disposemymeds.org.         Follow-ups after your visit        Additional Services     CARDIAC REHAB REFERRAL       Please be aware that coverage of these services is subject to the terms and limitations of your health insurance plan.  Call member services at your health plan with any benefit or coverage questions.     Order is sent electronically to central rehab scheduling. Call [phone] if you haven't been contacted regarding these appointments within 2 business days of discharge.                  Your next 10 appointments already scheduled     Lai 15, 2017  1:45 PM CDT   LAB with RU LAB   [facility] PHYSICIANS [facility] AT [place] ([facility])    [address]  [facility] [id number] [phone]           Patient must bring picture ID.  Patient should be prepared to give a urine specimen  Please do not eat 10-12 hours before your appointment if you are coming in fasting for labs on lipids, cholesterol, or  glucose (sugar).  Pregnant women should follow their Care Team instructions. Water with medications is okay. Do not drink coffee or other fluids.   If you have concerns about taking  your medications, please ask at office or if scheduling via [facility], send a message by clicking on Secure Messaging, Message Your Care Team.            Lai 15, 2017  2:45 PM CDT   Neuro Treatment with Patsy Martinez, PT   [facility] ([facility])    [address] [address]   [phone]            Lai 15, 2017  3:30 PM CDT   Neuro Treatment with Chloé Luu, OT   [facility] ([facility])    [address] [address]   [phone]            Jun 19, 2017  1:45 PM CDT   Neuro Treatment with Chloé Luu, OT   [facility] ([facility])    [address] [address]   [phone]            Jun 20, 2017  2:00 PM CDT   Neuro Treatment with Vivian Plunkett, SLP   [facility] ([facility])    [address] [id number] [phone]            Jun 20, 2017  3:15 PM CDT   Neuro Treatment with Chloé Luu, OT   [facility] ([facility])    [address] [address] [phone]            Jun 20, 2017  4:15 PM CDT   Neuro Treatment with Patsy Martinez, PT   [facility] ([facility])    [address] [address]   [phone]            Jun 22, 2017  7:45 AM CDT   Return Visit with Johnathan Mckeon MD   [facility] PHYSICIANS [facility] AT [place] ([facility])    [address] [place] [address] [phone]            Jun 22, 2017  2:15 PM CDT   Neuro Treatment with Patsy Martinez, PT   [facility] ([facility])    [address] [facility] [address] [address]   [phone]            Jun 22, 2017  3:00 PM CDT   Neuro Treatment with Vivian Plunkett SLP   [facility] [place] [facility] ([facility])    [address]  [facility] [address]   [phone]               Care Instructions        After Care Instructions     Discharge Instructions - Follow up with [facility] Nurse Practitioner        Follow up with [facility] Nurse Practitioner at [facility] of patient preference in 7-10 days.            Discharge Instructions - IF on Metformin (Glucophage or Glucovance) or Metformin containing medications       IF on Metformin (Glucophage or Glucovance) or Metformin containing medications , schedule a Basic Metabolic Panel at [facility] or Primary Clinic in 48 - 72 hours post procedure and PRIOR TO resuming the Metformin or Metformin containing medications.  Hold Metformin (Glucophage or Glucovance) or Metformin containing medications until after the Basic Metabolic Panel on the 2nd or 3rd day following the procedure.  May resume after blood draw is complete.                  Further instructions from your care team       Cardiac Angioplasty/Stent Discharge Instructions - Radial    by Dr. Hudson    After you go home:      Have an adult stay with you until tomorrow.    Drink extra fluids for 2 days.    You may resume your normal diet.    No smoking       For 24 hours - due to the sedation you received:    Relax and take it easy.    Do NOT make any important or legal decisions.    Do NOT drive or operate machines at home or at work.    Do NOT drink alcohol.    Care of Wrist Puncture Site:      For the first 24 hrs - check the puncture site every 1-2 hours while awake.    It is normal to have soreness at the puncture site and mild tingling in your hand for up to 3 days.    Remove the bandaid after 24 hours. If there is minor oozing, apply another bandaid and remove it after 12 hours.    You  may shower tomorrow.  Do NOT take a bath, or use a hot tub or pool for at least 3 days. Do NOT scrub the site. Do not use lotion or powder near the puncture site.           Activity:          For 2 days:     do not use your hand or arm to support your weight (such as rising from a chair)     do not bend your wrist (such as lifting a garage door).    do not lift more than 5 pounds or exercise your arm (such as tennis, golf or bowling).    Do NOT do any heavy activity such as exercise, lifting, or straining.     Bleeding:      If you start bleeding from the site in your wrist, sit down and press firmly on/above the site for 10 minutes.     Once bleeding stops, keep arm still for 2 hours.     Call [facility] as soon as you can.       Call 911 right away if you have heavy bleeding or bleeding that does not stop.      Medicines:      If you are taking antiplatelet medications such as Plavix, Brilinta or Effient, do not stop taking it until you talk to your cardiologist.        If you are on Metformin (Glucophage) and your GFR (kidney function level) is >30, you may continue taking your Metformin.    If you are on Metformin (Glucophage) and your GFR (kidney function level) is <30, do not restart the Metformin for 48 hours after your procedure. Check with your primary care giver before restarting the Metformin to see if you need to have blood drawn to recheck your kidney function (GFR).    Take your medications, including blood thinners, unless your provider tells you not to.  If you take Coumadin (Warfarin), have your INR checked by your provider in  3-5 days. Call your clinic to schedule this.    If you have stopped any other medicines, check with your provider about when to restart them.    Follow Up Appointments:      Follow up with [facility] Nurse Practitioner at [facility] of patient preference in 7-10 days.    Cardiac Rehab will contact you for follow up care.    Call the clinic if:      You have a large or  "growing hard lump around the site.    The site is red, swollen, hot or tender.    Blood or fluid is draining from the site.    You have chills or a fever greater than 101 F (38 C).    Your arm turns feels numb, cool or changes color.    You have hives, a rash or unusual itching.    Any questions or concerns.    Other Instructions:      If you received a stent - carry your stent card with you at all times.      [facility]:    [phone] [facility] (7 days a week)             Additional Information About Your Visit        MyChart Information     [email or web address] lets you send messages to your doctor, view your test results, renew your prescriptions, schedule appointments and more. To sign up, go to [email or web address] . Click on \"Log in\" on the left side of the screen, which will take you to the Welcome page. Then click on \"Sign up Now\" on the right side of the page.     You will be asked to enter the access code listed below, as well as some personal information. Please follow the directions to create your username and password.     Your access code is: [email or web address]  Expires: 2017 11:13 AM     Your access code will  in 90 days. If you need help or a new code, please call your [facility] clinic or [phone].        Care EveryWhere ID     This is your Care EveryWhere ID. This could be used by other organizations to access your [facility] medical records  [id number]        Your Vitals Were     Blood Pressure Pulse Temperature Respirations Height Weight    132/81 57 97.7  F (36.5  C) (Oral) 12 1.93 m (6' 4\") 104.8 kg (231 lb)    Pulse Oximetry BMI (Body Mass Index)                98% 28.12 kg/m2           Primary Care Provider Office Phone # Fax #    Davion Cordova PA-C [phone]      Thank you!     Thank you for choosing [facility] for your care. Our goal is always to provide you with excellent care. Hearing back from our patients is one way we can " continue to improve our services. Please take a few minutes to complete the written survey that you may receive in the mail after you visit with us. Thank you!             Medication List: This is a list of all your medications and when to take them. Check marks below indicate your daily home schedule. Keep this list as a reference.      Medications           Morning Afternoon Evening Bedtime As Needed    amLODIPine 5 MG tablet   Commonly known as:  NORVASC   Take 1 tablet (5 mg) by mouth 2 times daily                                Blood Pressure Monitor Kit   Use to monitor blood pressure daily or as directed                                clopidogrel 75 MG tablet   Commonly known as:  PLAVIX   Take 1 tablet (75 mg) by mouth daily   Start taking on:  6/14/2017   Last time this was given:  600 mg on 6/13/2017  1:49 PM                                D3 ADULT PO   Take 2,000 Units by mouth daily                                ELIQUIS PO   Take 2.5 mg by mouth daily                                enoxaparin 40 MG/0.4ML injection   Commonly known as:  LOVENOX   Inject 0.4 mLs (40 mg) Subcutaneous See Admin Instructions                                insulin glargine 100 UNIT/ML injection   Inject 20 Units Subcutaneous every 24 hours                                isosorbide mononitrate 30 MG 24 hr tablet   Commonly known as:  IMDUR   Take 1 tablet (30 mg) by mouth daily                                levothyroxine 137 MCG tablet   Commonly known as:  SYNTHROID/LEVOTHROID   Take 137 mcg by mouth At Bedtime                                lisinopril 20 MG tablet   Commonly known as:  PRINIVIL/ZESTRIL   Take 20 mg by mouth 2 times daily                                metFORMIN 1000 MG tablet   Commonly known as:  GLUCOPHAGE   Take 1 tablet (1,000 mg) by mouth 2 times daily (with meals)                                metoprolol 100 MG 24 hr tablet   Commonly known as:  TOPROL-XL   Take 1 tablet (100 mg) by mouth At Bedtime                                 nitroglycerin 0.4 MG sublingual tablet   Commonly known as:  NITROSTAT   Place 1 tablet (0.4 mg) under the tongue every 5 minutes as needed for chest pain                                ONE TOUCH LANCETS Misc   use qid                                ONE TOUCH ULTRA test strip   use qid   Generic drug:  blood glucose monitoring                                order for DME   Equipment being ordered: Cane () Treatment Diagnosis: Impaired balance                                pantoprazole 40 MG EC tablet   Commonly known as:  PROTONIX   Take 1 tablet (40 mg) by mouth every morning (before breakfast)                                simvastatin 40 MG tablet   Commonly known as:  ZOCOR   Take 1 tablet (40 mg) by mouth At Bedtime

## 2017-06-13 NOTE — DISCHARGE INSTRUCTIONS
Cardiac Angioplasty/Stent Discharge Instructions - Radial    by Dr. Hudson    After you go home:      Have an adult stay with you until tomorrow.    Drink extra fluids for 2 days.    You may resume your normal diet.    No smoking       For 24 hours - due to the sedation you received:    Relax and take it easy.    Do NOT make any important or legal decisions.    Do NOT drive or operate machines at home or at work.    Do NOT drink alcohol.    Care of Wrist Puncture Site:      For the first 24 hrs - check the puncture site every 1-2 hours while awake.    It is normal to have soreness at the puncture site and mild tingling in your hand for up to 3 days.    Remove the bandaid after 24 hours. If there is minor oozing, apply another bandaid and remove it after 12 hours.    You may shower tomorrow.  Do NOT take a bath, or use a hot tub or pool for at least 3 days. Do NOT scrub the site. Do not use lotion or powder near the puncture site.           Activity:          For 2 days:     do not use your hand or arm to support your weight (such as rising from a chair)     do not bend your wrist (such as lifting a garage door).    do not lift more than 5 pounds or exercise your arm (such as tennis, golf or bowling).    Do NOT do any heavy activity such as exercise, lifting, or straining.     Bleeding:      If you start bleeding from the site in your wrist, sit down and press firmly on/above the site for 10 minutes.     Once bleeding stops, keep arm still for 2 hours.     Call [facility] as soon as you can.       Call 911 right away if you have heavy bleeding or bleeding that does not stop.      Medicines:      If you are taking antiplatelet medications such as Plavix, Brilinta or Effient, do not stop taking it until you talk to your cardiologist.        If you are on Metformin (Glucophage) and your GFR (kidney function level) is >30, you may continue taking your Metformin.    If you are on Metformin (Glucophage) and your GFR  (kidney function level) is <30, do not restart the Metformin for 48 hours after your procedure. Check with your primary care giver before restarting the Metformin to see if you need to have blood drawn to recheck your kidney function (GFR).    Take your medications, including blood thinners, unless your provider tells you not to.  If you take Coumadin (Warfarin), have your INR checked by your provider in  3-5 days. Call your clinic to schedule this.    If you have stopped any other medicines, check with your provider about when to restart them.    Follow Up Appointments:      Follow up with [facility] Nurse Practitioner at [facility] of patient preference in 7-10 days.    Cardiac Rehab will contact you for follow up care.    Call the clinic if:      You have a large or growing hard lump around the site.    The site is red, swollen, hot or tender.    Blood or fluid is draining from the site.    You have chills or a fever greater than 101 F (38 C).    Your arm turns feels numb, cool or changes color.    You have hives, a rash or unusual itching.    Any questions or concerns.    Other Instructions:      If you received a stent - carry your stent card with you at all times.      [facility] at [place]:    [phone] [facility] (7 days a week)

## 2017-06-13 NOTE — PROGRESS NOTES
PRELIMINARY CARDIAC CATH REPORT:     PROCEDURES PERFORMED:   1. Selective left and right coronary angiography.  2. Percutaneous coronary intervention of the OM3.        PHYSICIANS:  1. Attending Interventional Cardiology Staff: Kwasi Hudson MD  2. Interventional Cardiology Fellow: Oscar Montaño DO       INDICATION:  Jayro Elder is a 66 year old male with known CAD s/p multiple PCI to the LAD with chronic stable angina, refractory to medical therapy and stress test demonstrating intermediate risk ischemia to the lateral wall. Patient had a recent CVA which is suspected to be due to atrial fibrillation, for which he is currently on Apixiban 2.5mg BID for.     DESCRIPTION:  1. Consent obtained with discussion of risks.  All questions were answered.  2. Sterile prep and procedure.  3. Location: left radial artery.  4. Access: Local anesthetic with lidocaine.  A standard (18 g) needle was used to establish vascular access using a modified Seldinger technique.  5. Sheath: 6Fr standard sheath.  6. Catheters: 6Fr JR-4, JL-4, 6Fr EBU 3.75 guide.  7. Fluoroscopy time of 11.5 min.  8. Estimated blood loss of <10 mL.  9. See below for procedure details.    MEDICATIONS:  1. Contrast 180 mL IV.  2. Conscious sedation with fentanyl and midazolam as directed by the attending cardiologist.  3. Heparin, verapamil and nitroglycerin intra-arterial for radial artery spasm prophylaxis.  4. Heparin administered to achieve a goal ACT > 250 sec.   5. Nitroglycerin intracoronary.  6. Plavix 600 mg po.    CONSCIOUS SEDATION:   The procedure was performed under conscious sedation for 50 min from 1326 to 1416.  A total of 3 mg Versed and 150 mcg Fentanyl were used. Heart rate, blood pressure, respiration, oxygen saturation, and patient responses were monitored throughout the procedure with RN assistance.       HEMODYNAMICS:  1. HR 57 bpm  2. Ao /74/70 mmHg        CORONARY ANGIOGRAM:  1. Both coronary arteries arise from their  respective cusps normally.  2. Right dominant.  3. Left Main (LM) large caliber, short vessel with < 10 % stenosis.  4. Left Anterior Descending (LAD): is a adelaide calbier type 3 vessel which gives rise to septal perforates, a small early D1, and two medium D2-D3. There are patent stents in the mid to distal LAD, jailing D2-D3 with mild instent restenosis, <20% stenosis and JESÚS 3 flow to distal vessels and diagonals.    5. Left Circumflex (LCX): Large caliber which gives rise to two small caliber OM1-OM2, a large branching OM3, and a medium long left posterolateral vessel. There is 70 % stenosis in the mid large OM3. The remainder of the vessel has luminal irregularities with < 20% stenosis.   6. Right Coronary Artery (RCA): large caliber, heavily calcified vessel which gives rise to a posterior descending artery and a posterolateral branch system. There is moderate disease with 30 % stenosis in the proximal and mid RCA.        PERCUTANEOUS CORONARY INTERVENTION:  1. OM3 Lesion:  A 6Fr EBU 3.75 guide catheter was positioned at the ostium of the LM.  A Runthrough wire was advanced across the OM3 lesion and positioned in the distal OM3.  A 3.0x20mm balloon was used to pre-dilate the lesion. There was a focal non-dilatable area, so a 3.0x20mm NC balloon successfully pre-dilated this lesion.  A 3.0x38mm Synergy drug eluting stent was successfully deployed across the lesion with inflation to 12 luiz.  The stent was post-dilated with a 3.0x20mm non-compliant balloon, and a 3.5x12mm NC balloon to the focal area.  Final angiography showed no evidence of perforation or dissection with residual stenosis of 0% and JESÚS 3 flow.  No complications.      COMPLICATIONS:  1. None    SUMMARY:   1. Chest pain and abnormal stress test secondary to culprit OM3 lesion.  2. Two vessel coronary artery disease with severe OM3 stenosis and patent stents to LAD.   3. Successful deployment of a 3.0x38mm Synergy drug eluting stent to the large  OM3.      PLAN:   1. Aspirin 81 mg po daily for two weeks, then stop while on Apixiban.  2. Plavix 75 mg po daily for at least 1 year uninterrupted.  3. Resume Apixiban at 2.5mg BID. Given normal renal function, consider standard dose Apixiban 5mg BID once off of aspirin.   4. Bedrest and access site monitoring per protocol.  5. Discharge today per protocol.  6. Continued aggressive medical management and lifestyle modification for cardiovascular risk factor optimization.       Oscar Montaño DO, [facility]  Interventional Cardiology Fellow  [phone]

## 2017-06-13 NOTE — PROGRESS NOTES
Pt up and ambulated with stand by assist to bathroom in the [place].  Tolerated activity well and pt states voided good amount when up.  Left radial site remains stable.  Denies nausea or vomiting.  Has low level chest pain post procedure--pt states improved since procedure.

## 2017-06-13 NOTE — CONSULTS
I have examined the patient, reviewed the history, medications and pre procedural tests. Progressive disabling angina with lateral wall ischemia on nuclear stress test. Multiple PCI in LAD in past. Recent stroke for which he will require apixaban. I have explained to the patient the risks of death, MI, stroke, hematoma, possible urgent bypass surgery for failed PCI, use of stents, thienopyridine agents, possible peripheral vascular complications, arrhythmia, the use of FFR in clinical decision-making and alternative of medical therapy alone in regards to left heart catheterization, left ventriculography, coronary angiography, and possible percutaneous coronary intervention. The patient voiced understanding and wishes to proceed. The patient has a good ler  ft radial pulse, normal ulnar pulse and a normal Ralph's sign.

## 2017-06-13 NOTE — PROGRESS NOTES
D/C instructions reviewed with pt and wife prior to D/C to home. Pt given filled plavix prescription.  Pt aware of schedule to resume and begin medications. Pt aware to hold metformin per heart clinic instructions and obtain lab work as scheduled 6/15.  Pt will resume Eliquis per instructions. Reviewed left hand and wrist restrictions and pt appears to accept and understand. All questions answered and pt appears to accept and understand.

## 2017-06-13 NOTE — PROGRESS NOTES
After 30 minutes with no pressure radial device removed with immediate hematoma at left radial site. Compression device back on and 4 clicks of device pressure reapplied. No further bleeding noted. Dr. Hudson in and aware of hematoma. Instructed pt to resume Eliquis 6/14 instead of tonight.  AVS updated and reviewed with pt and wife.

## 2017-06-13 NOTE — PROGRESS NOTES
"Patient is here for coronary angiogram, planning radial approach with Dr. Hudson.  Patient has disabling angina, is on Imdur, does not often use sublingual NTG.  Has baseline upper left chest pain near neck/shoulder rated 3/10.  States understanding of contrast discharge instructions, and copy given to wife. Blood sugar is 102.      1430  Returned from CV lab, s/p stent to 1st OM per report.  States pain is now about 2/10 left upper chest.  \"Tracelet\" radial device on left wrist with 14cc air. Patient taking po food/fluids well.  He was given stent card/booklet.   Plavix prescription was filled.      1630  Report to Nichole ULLOA RN.      "

## 2017-06-14 ENCOUNTER — TELEPHONE (OUTPATIENT)
Dept: CARDIOLOGY | Facility: CLINIC | Age: 67
End: 2017-06-14

## 2017-06-14 NOTE — TELEPHONE ENCOUNTER
Spoke with patient post discharge from care suites after coronary angiography.     Intervention: Synergy SUSAN to large OM3     Access Site: Left Radial Artery    Instructions:   Patient may remove the Band-Aid after 24 hours, but if there is minor oozing apply another and may remove second Band-Aid after 12 hours. Also, no heavy exercise for 2 days, do not take a bath, or use a hot tub or pool for at least 3 days but may shower.     Reviewed with patient care of wrist site and that is it normal to have soreness at the puncture site and mild tingling in the hand for up to 3 days. For 2 days use hand to support body weight or bend wrist, do not lift > 5 lbs or exercise the arm. If the wrist site startsto bleed, sit down and place firm pressure at the site with your fingers for 10 minutes. If the bleeding stops, sit still and keep wrist straight for 2 hours.     Instructed patient to call 911 right away if the bleeding is heavy or does not stop. Call the clinic if there is a large or growing lump around the site, the site is red, swollen, hot, or tender, have fever >101 degrees F or chills, your arm or leg feels numb or cool, or hives, a rash, or unusual itching, shortness of breath, or chest discomfort.     Heart Follow Up: 6/22 Dr. Mckeon 7:45 am    After hours contact number [phone] option 2.       Mayelin Hickman PA-C   6/14/2017

## 2017-06-15 ENCOUNTER — DOCUMENTATION ONLY (OUTPATIENT)
Dept: CARDIOLOGY | Facility: CLINIC | Age: 67
End: 2017-06-15

## 2017-06-15 ENCOUNTER — HOSPITAL ENCOUNTER (INPATIENT)
Facility: CLINIC | Age: 67
LOS: 2 days | Discharge: HOME | DRG: 287 | End: 2017-06-17
Attending: EMERGENCY MEDICINE | Admitting: HOSPITALIST
Payer: COMMERCIAL

## 2017-06-15 ENCOUNTER — APPOINTMENT (OUTPATIENT)
Dept: GENERAL RADIOLOGY | Facility: CLINIC | Age: 67
DRG: 287 | End: 2017-06-15
Attending: EMERGENCY MEDICINE
Payer: COMMERCIAL

## 2017-06-15 DIAGNOSIS — R07.9 CHEST PAIN: ICD-10-CM

## 2017-06-15 DIAGNOSIS — I25.118 CORONARY ARTERY DISEASE INVOLVING NATIVE CORONARY ARTERY OF NATIVE HEART WITH OTHER FORM OF ANGINA PECTORIS (H): Primary | ICD-10-CM

## 2017-06-15 DIAGNOSIS — I25.118 CORONARY ARTERY DISEASE INVOLVING NATIVE CORONARY ARTERY OF NATIVE HEART WITH OTHER FORM OF ANGINA PECTORIS (H): ICD-10-CM

## 2017-06-15 LAB
ANION GAP SERPL CALCULATED.3IONS-SCNC: 8 MMOL/L (ref 3–14)
ANION GAP SERPL CALCULATED.3IONS-SCNC: 9 MMOL/L (ref 3–14)
BASOPHILS # BLD AUTO: 0 10E9/L (ref 0–0.2)
BASOPHILS NFR BLD AUTO: 0.2 %
BUN SERPL-MCNC: 14 MG/DL (ref 7–30)
BUN SERPL-MCNC: 15 MG/DL (ref 7–30)
CALCIUM SERPL-MCNC: 8.6 MG/DL (ref 8.5–10.1)
CALCIUM SERPL-MCNC: 8.9 MG/DL (ref 8.5–10.1)
CHLORIDE SERPL-SCNC: 105 MMOL/L (ref 94–109)
CHLORIDE SERPL-SCNC: 105 MMOL/L (ref 94–109)
CO2 SERPL-SCNC: 23 MMOL/L (ref 20–32)
CO2 SERPL-SCNC: 24 MMOL/L (ref 20–32)
CREAT SERPL-MCNC: 0.77 MG/DL (ref 0.66–1.25)
CREAT SERPL-MCNC: 0.85 MG/DL (ref 0.66–1.25)
DIFFERENTIAL METHOD BLD: ABNORMAL
EOSINOPHIL # BLD AUTO: 0.2 10E9/L (ref 0–0.7)
EOSINOPHIL NFR BLD AUTO: 2.6 %
ERYTHROCYTE [DISTWIDTH] IN BLOOD BY AUTOMATED COUNT: 13.5 % (ref 10–15)
GFR SERPL CREATININE-BSD FRML MDRD: 90 ML/MIN/1.7M2
GFR SERPL CREATININE-BSD FRML MDRD: ABNORMAL ML/MIN/1.7M2
GLUCOSE BLDC GLUCOMTR-MCNC: 272 MG/DL (ref 70–99)
GLUCOSE BLDC GLUCOMTR-MCNC: 305 MG/DL (ref 70–99)
GLUCOSE SERPL-MCNC: 283 MG/DL (ref 70–99)
GLUCOSE SERPL-MCNC: 319 MG/DL (ref 70–99)
HCT VFR BLD AUTO: 37.4 % (ref 40–53)
HGB BLD-MCNC: 12.7 G/DL (ref 13.3–17.7)
IMM GRANULOCYTES # BLD: 0 10E9/L (ref 0–0.4)
IMM GRANULOCYTES NFR BLD: 0.2 %
INTERPRETATION ECG - MUSE: NORMAL
LYMPHOCYTES # BLD AUTO: 1 10E9/L (ref 0.8–5.3)
LYMPHOCYTES NFR BLD AUTO: 17.5 %
MCH RBC QN AUTO: 28.7 PG (ref 26.5–33)
MCHC RBC AUTO-ENTMCNC: 34 G/DL (ref 31.5–36.5)
MCV RBC AUTO: 84 FL (ref 78–100)
MONOCYTES # BLD AUTO: 0.6 10E9/L (ref 0–1.3)
MONOCYTES NFR BLD AUTO: 9.9 %
NEUTROPHILS # BLD AUTO: 4 10E9/L (ref 1.6–8.3)
NEUTROPHILS NFR BLD AUTO: 69.6 %
NRBC # BLD AUTO: 0 10*3/UL
NRBC BLD AUTO-RTO: 0 /100
PLATELET # BLD AUTO: 220 10E9/L (ref 150–450)
POTASSIUM SERPL-SCNC: 4.2 MMOL/L (ref 3.4–5.3)
POTASSIUM SERPL-SCNC: 4.7 MMOL/L (ref 3.4–5.3)
RBC # BLD AUTO: 4.43 10E12/L (ref 4.4–5.9)
SODIUM SERPL-SCNC: 136 MMOL/L (ref 133–144)
SODIUM SERPL-SCNC: 138 MMOL/L (ref 133–144)
TROPONIN I SERPL-MCNC: 0.85 UG/L (ref 0–0.04)
TROPONIN I SERPL-MCNC: 0.96 UG/L (ref 0–0.04)
WBC # BLD AUTO: 5.8 10E9/L (ref 4–11)

## 2017-06-15 PROCEDURE — 99285 EMERGENCY DEPT VISIT HI MDM: CPT | Mod: 25

## 2017-06-15 PROCEDURE — 21000001 ZZH R&B HEART CARE

## 2017-06-15 PROCEDURE — 36415 COLL VENOUS BLD VENIPUNCTURE: CPT | Performed by: HOSPITALIST

## 2017-06-15 PROCEDURE — 84484 ASSAY OF TROPONIN QUANT: CPT | Performed by: EMERGENCY MEDICINE

## 2017-06-15 PROCEDURE — 25000131 ZZH RX MED GY IP 250 OP 636 PS 637: Performed by: HOSPITALIST

## 2017-06-15 PROCEDURE — 25000132 ZZH RX MED GY IP 250 OP 250 PS 637: Performed by: HOSPITALIST

## 2017-06-15 PROCEDURE — 99223 1ST HOSP IP/OBS HIGH 75: CPT | Mod: AI | Performed by: HOSPITALIST

## 2017-06-15 PROCEDURE — 85025 COMPLETE CBC W/AUTO DIFF WBC: CPT | Performed by: EMERGENCY MEDICINE

## 2017-06-15 PROCEDURE — 93010 ELECTROCARDIOGRAM REPORT: CPT | Performed by: INTERNAL MEDICINE

## 2017-06-15 PROCEDURE — 84484 ASSAY OF TROPONIN QUANT: CPT | Performed by: HOSPITALIST

## 2017-06-15 PROCEDURE — 80048 BASIC METABOLIC PNL TOTAL CA: CPT | Performed by: NURSE PRACTITIONER

## 2017-06-15 PROCEDURE — 93005 ELECTROCARDIOGRAM TRACING: CPT

## 2017-06-15 PROCEDURE — 00000146 ZZHCL STATISTIC GLUCOSE BY METER IP

## 2017-06-15 PROCEDURE — 80048 BASIC METABOLIC PNL TOTAL CA: CPT | Performed by: EMERGENCY MEDICINE

## 2017-06-15 PROCEDURE — 71020 XR CHEST 2 VW: CPT

## 2017-06-15 PROCEDURE — 25000132 ZZH RX MED GY IP 250 OP 250 PS 637: Performed by: EMERGENCY MEDICINE

## 2017-06-15 PROCEDURE — 36415 COLL VENOUS BLD VENIPUNCTURE: CPT | Performed by: NURSE PRACTITIONER

## 2017-06-15 RX ORDER — ASPIRIN 81 MG/1
81 TABLET, CHEWABLE ORAL DAILY
Status: DISCONTINUED | OUTPATIENT
Start: 2017-06-16 | End: 2017-06-16

## 2017-06-15 RX ORDER — METOPROLOL SUCCINATE 100 MG/1
100 TABLET, EXTENDED RELEASE ORAL AT BEDTIME
Status: DISCONTINUED | OUTPATIENT
Start: 2017-06-15 | End: 2017-06-17 | Stop reason: HOSPADM

## 2017-06-15 RX ORDER — ONDANSETRON 4 MG/1
4 TABLET, ORALLY DISINTEGRATING ORAL EVERY 6 HOURS PRN
Status: DISCONTINUED | OUTPATIENT
Start: 2017-06-15 | End: 2017-06-17 | Stop reason: HOSPADM

## 2017-06-15 RX ORDER — PROCHLORPERAZINE 25 MG
12.5 SUPPOSITORY, RECTAL RECTAL EVERY 12 HOURS PRN
Status: DISCONTINUED | OUTPATIENT
Start: 2017-06-15 | End: 2017-06-17 | Stop reason: HOSPADM

## 2017-06-15 RX ORDER — ONDANSETRON 2 MG/ML
4 INJECTION INTRAMUSCULAR; INTRAVENOUS EVERY 6 HOURS PRN
Status: DISCONTINUED | OUTPATIENT
Start: 2017-06-15 | End: 2017-06-17 | Stop reason: HOSPADM

## 2017-06-15 RX ORDER — DEXTROSE MONOHYDRATE 25 G/50ML
25-50 INJECTION, SOLUTION INTRAVENOUS
Status: DISCONTINUED | OUTPATIENT
Start: 2017-06-15 | End: 2017-06-17 | Stop reason: HOSPADM

## 2017-06-15 RX ORDER — ISOSORBIDE MONONITRATE 30 MG/1
30 TABLET, EXTENDED RELEASE ORAL DAILY
Status: DISCONTINUED | OUTPATIENT
Start: 2017-06-16 | End: 2017-06-17 | Stop reason: HOSPADM

## 2017-06-15 RX ORDER — NITROGLYCERIN 0.4 MG/1
0.4 TABLET SUBLINGUAL EVERY 5 MIN PRN
Status: DISCONTINUED | OUTPATIENT
Start: 2017-06-15 | End: 2017-06-15

## 2017-06-15 RX ORDER — LISINOPRIL 20 MG/1
20 TABLET ORAL 2 TIMES DAILY
Status: DISCONTINUED | OUTPATIENT
Start: 2017-06-15 | End: 2017-06-17 | Stop reason: HOSPADM

## 2017-06-15 RX ORDER — BISACODYL 10 MG
10 SUPPOSITORY, RECTAL RECTAL DAILY PRN
Status: DISCONTINUED | OUTPATIENT
Start: 2017-06-15 | End: 2017-06-17 | Stop reason: HOSPADM

## 2017-06-15 RX ORDER — AMOXICILLIN 250 MG
1-2 CAPSULE ORAL 2 TIMES DAILY PRN
Status: DISCONTINUED | OUTPATIENT
Start: 2017-06-15 | End: 2017-06-17 | Stop reason: HOSPADM

## 2017-06-15 RX ORDER — NICOTINE POLACRILEX 4 MG
15-30 LOZENGE BUCCAL
Status: DISCONTINUED | OUTPATIENT
Start: 2017-06-15 | End: 2017-06-17 | Stop reason: HOSPADM

## 2017-06-15 RX ORDER — HYDRALAZINE HYDROCHLORIDE 20 MG/ML
10 INJECTION INTRAMUSCULAR; INTRAVENOUS EVERY 4 HOURS PRN
Status: DISCONTINUED | OUTPATIENT
Start: 2017-06-15 | End: 2017-06-17 | Stop reason: HOSPADM

## 2017-06-15 RX ORDER — PANTOPRAZOLE SODIUM 40 MG/1
40 TABLET, DELAYED RELEASE ORAL
Status: DISCONTINUED | OUTPATIENT
Start: 2017-06-16 | End: 2017-06-17 | Stop reason: HOSPADM

## 2017-06-15 RX ORDER — NALOXONE HYDROCHLORIDE 0.4 MG/ML
.1-.4 INJECTION, SOLUTION INTRAMUSCULAR; INTRAVENOUS; SUBCUTANEOUS
Status: DISCONTINUED | OUTPATIENT
Start: 2017-06-15 | End: 2017-06-17

## 2017-06-15 RX ORDER — AMLODIPINE BESYLATE 5 MG/1
5 TABLET ORAL 2 TIMES DAILY
Status: DISCONTINUED | OUTPATIENT
Start: 2017-06-15 | End: 2017-06-17 | Stop reason: HOSPADM

## 2017-06-15 RX ORDER — CLOPIDOGREL BISULFATE 75 MG/1
75 TABLET ORAL DAILY
Status: DISCONTINUED | OUTPATIENT
Start: 2017-06-16 | End: 2017-06-17 | Stop reason: HOSPADM

## 2017-06-15 RX ORDER — NITROGLYCERIN 0.4 MG/1
0.4 TABLET SUBLINGUAL EVERY 5 MIN PRN
Status: DISCONTINUED | OUTPATIENT
Start: 2017-06-15 | End: 2017-06-17 | Stop reason: HOSPADM

## 2017-06-15 RX ORDER — LIDOCAINE 40 MG/G
CREAM TOPICAL
Status: DISCONTINUED | OUTPATIENT
Start: 2017-06-15 | End: 2017-06-15

## 2017-06-15 RX ORDER — PROCHLORPERAZINE MALEATE 5 MG
5 TABLET ORAL EVERY 6 HOURS PRN
Status: DISCONTINUED | OUTPATIENT
Start: 2017-06-15 | End: 2017-06-17 | Stop reason: HOSPADM

## 2017-06-15 RX ORDER — SIMVASTATIN 40 MG
40 TABLET ORAL AT BEDTIME
Status: DISCONTINUED | OUTPATIENT
Start: 2017-06-15 | End: 2017-06-17 | Stop reason: HOSPADM

## 2017-06-15 RX ORDER — ACETAMINOPHEN 325 MG/1
650 TABLET ORAL EVERY 4 HOURS PRN
Status: DISCONTINUED | OUTPATIENT
Start: 2017-06-15 | End: 2017-06-17 | Stop reason: HOSPADM

## 2017-06-15 RX ORDER — NITROGLYCERIN 0.4 MG/1
0.4 TABLET SUBLINGUAL EVERY 5 MIN PRN
Status: COMPLETED | OUTPATIENT
Start: 2017-06-15 | End: 2017-06-15

## 2017-06-15 RX ADMIN — NITROGLYCERIN 0.4 MG: 0.4 TABLET SUBLINGUAL at 16:48

## 2017-06-15 RX ADMIN — APIXABAN 2.5 MG: 2.5 TABLET, FILM COATED ORAL at 20:35

## 2017-06-15 RX ADMIN — NITROGLYCERIN 0.4 MG: 0.4 TABLET SUBLINGUAL at 22:12

## 2017-06-15 RX ADMIN — NITROGLYCERIN 0.4 MG: 0.4 TABLET SUBLINGUAL at 21:55

## 2017-06-15 RX ADMIN — NITROGLYCERIN 0.4 MG: 0.4 TABLET SUBLINGUAL at 22:05

## 2017-06-15 RX ADMIN — NITROGLYCERIN 0.4 MG: 0.4 TABLET SUBLINGUAL at 17:23

## 2017-06-15 RX ADMIN — INSULIN ASPART 3 UNITS: 100 INJECTION, SOLUTION INTRAVENOUS; SUBCUTANEOUS at 22:23

## 2017-06-15 RX ADMIN — LEVOTHYROXINE SODIUM 137 MCG: 112 TABLET ORAL at 21:15

## 2017-06-15 RX ADMIN — SIMVASTATIN 40 MG: 40 TABLET, FILM COATED ORAL at 21:15

## 2017-06-15 RX ADMIN — NITROGLYCERIN 0.4 MG: 0.4 TABLET SUBLINGUAL at 16:22

## 2017-06-15 RX ADMIN — ACETAMINOPHEN 650 MG: 325 TABLET, FILM COATED ORAL at 22:05

## 2017-06-15 RX ADMIN — METOPROLOL SUCCINATE 100 MG: 100 TABLET, EXTENDED RELEASE ORAL at 21:15

## 2017-06-15 RX ADMIN — AMLODIPINE BESYLATE 5 MG: 5 TABLET ORAL at 20:35

## 2017-06-15 RX ADMIN — INSULIN GLARGINE 20 UNITS: 100 INJECTION, SOLUTION SUBCUTANEOUS at 20:35

## 2017-06-15 RX ADMIN — LISINOPRIL 20 MG: 20 TABLET ORAL at 20:35

## 2017-06-15 ASSESSMENT — ENCOUNTER SYMPTOMS
BACK PAIN: 1
COUGH: 0
FEVER: 0
CHILLS: 0
WEAKNESS: 1
DIAPHORESIS: 0

## 2017-06-15 ASSESSMENT — PAIN DESCRIPTION - DESCRIPTORS
DESCRIPTORS: PRESSURE

## 2017-06-15 NOTE — ED NOTES
"[facility]  ED Nurse Handoff Report    ED Chief complaint: Chest Pain (Stent placed two days ago- CP today in chest and into back, heaviness \"feels like ribs are cracking when I breathe\")      ED Diagnosis:   Final diagnoses:   None       Code Status: Full Code    Allergies:   Allergies   Allergen Reactions     No Known Allergies        Activity level - Baseline/Home:  Independent    Activity Level - Current:   Independent     Needed?: No    Isolation: No  Infection: Not Applicable    Bariatric?: No    Vital Signs:   Vitals:    06/15/17 1600 06/15/17 1613 06/15/17 1621 06/15/17 1630   BP: 131/88  132/89 122/87   Pulse:       Resp: 17 23 16 9   Temp:       TempSrc:       SpO2: 96% 97%  93%   Weight:       Height:           Cardiac Rhythm: ,   Cardiac  Cardiac Rhythm: Atrial fibrillation    Pain level: 0-10 Pain Scale: 4    Is this patient confused?: No    Patient Report: Initial Complaint: CP  Focused Assessment: Jayro Elder is a 66 year old male on Eliquis, Plavix, and Aspirin 81 who presents to the emergency department today for evaluation of chest pain. The patient had stenting performed on Tuesday, 06/13/2017, and went home that same day. The patient reports that this was his seventh stent.Jayro Elder is a 66 year old male on Eliquis, Plavix, and Aspirin 81 who presents to the emergency department today for evaluation of chest pain. The patient had stenting performed on Tuesday, 06/13/2017, and went home that same day. The patient reports that this was his seventh stent.  Reports CP started yeseterday and worsens when taking deep breaths.  Improvement with nitro x2.  Rates pain 2/10.  Patient has elevated troponin - recent stent.  VSS.  Pt a/o x4.     Tests Performed: EKG/blood/CXR  Abnormal Results: trop 0.9, glucose  Treatments provided: nitro    Family Comments: none    OBS brochure/video discussed/provided to patient: N/A    ED Medications:   Medications   lidocaine 1 % 1 " mL (not administered)   lidocaine (LMX4) cream (not administered)   sodium chloride (PF) 0.9% PF flush 3 mL (not administered)   sodium chloride (PF) 0.9% PF flush 3 mL (not administered)   nitroglycerin (NITROSTAT) sublingual tablet 0.4 mg (0.4 mg Sublingual Given 6/15/17 7688)       Drips infusing?:  No      ED NURSE PHONE NUMBER: *[phone]

## 2017-06-15 NOTE — PHARMACY-ADMISSION MEDICATION HISTORY
Admission medication history interview status for the 6/15/2017  admission is complete. See EPIC admission navigator for prior to admission medications     Medication history source reliability:Good    Actions taken by pharmacist (provider contacted, etc):None     Additional medication history information not noted on PTA med list :None    Medication reconciliation/reorder completed by provider prior to medication history? No    Time spent in this activity: 15MIN    Prior to Admission medications    Medication Sig Last Dose Taking? Auth Provider   ASPIRIN PO Take 81 mg by mouth 6/15/2017 at am Yes Reported, Patient   insulin glargine (LANTUS) 100 UNIT/ML injection Inject Subcutaneous every 24 hours Daily at 1700 6/14/2017 at 1700 Yes Unknown, Entered By History   Cholecalciferol (D3 ADULT PO) Take 2,000 Units by mouth daily 6/14/2017 at pm Yes Reported, Patient   clopidogrel (PLAVIX) 75 MG tablet Take 1 tablet (75 mg) by mouth daily 6/15/2017 at am Yes Oscar Montaño MD   Apixaban (ELIQUIS PO) Take 2.5 mg by mouth daily 6/15/2017 at am Yes Reported, Patient   isosorbide mononitrate (IMDUR) 30 MG 24 hr tablet Take 1 tablet (30 mg) by mouth daily 6/15/2017 at am Yes Anni Bradley APRN CNP   simvastatin (ZOCOR) 40 MG tablet Take 1 tablet (40 mg) by mouth At Bedtime 6/14/2017 at hs Yes Johnathan Mckeon MD   metoprolol (TOPROL-XL) 100 MG 24 hr tablet Take 1 tablet (100 mg) by mouth At Bedtime 6/14/2017 at hs Yes Johnathan Mckeon MD   amLODIPine (NORVASC) 5 MG tablet Take 1 tablet (5 mg) by mouth 2 times daily 6/15/2017 at am Yes Johnathan Mckeon MD   pantoprazole (PROTONIX) 40 MG enteric coated tablet Take 1 tablet (40 mg) by mouth every morning (before breakfast) 6/15/2017 at am Yes Ana Frankel MD   levothyroxine (SYNTHROID, LEVOTHROID) 137 MCG tablet Take 137 mcg by mouth At Bedtime  6/14/2017 at hs Yes Henrry Cheney PA-C   lisinopril (PRINIVIL/ZESTRIL) 20 MG tablet  Take 20 mg by mouth 2 times daily   Reported, Patient   Blood Pressure Monitor KIT Use to monitor blood pressure daily or as directed   Davion Cordova PA-C   nitroglycerin (NITROSTAT) 0.4 MG sublingual tablet Place 1 tablet (0.4 mg) under the tongue every 5 minutes as needed for chest pain prn  Davion Cordova PA-C   order for DME Equipment being ordered: Cane ()  Treatment Diagnosis: Impaired balance   Orlando Tate MD   metFORMIN (GLUCOPHAGE) 1000 MG tablet Take 1 tablet (1,000 mg) by mouth 2 times daily (with meals) 6/12/2017  Ana Frankel MD   ONE TOUCH ULTRA TEST VI STRP use Benja Stephenson MD   ONE TOUCH LANCETS MISC use [place] MD

## 2017-06-15 NOTE — IP AVS SNAPSHOT
[facility]    [address] [place] [address]., [address]    [place] [address]    Phone:  [phone]                                       After Visit Summary   6/15/2017    Jayro Elder    MRN: [id number]           After Visit Summary Signature Page     I have received my discharge instructions, and my questions have been answered. I have discussed any challenges I see with this plan with the nurse or doctor.    ..........................................................................................................................................  Patient/Patient Representative Signature      ..........................................................................................................................................  Patient Representative Print Name and Relationship to Patient    ..................................................               ................................................  Date                                            Time    ..........................................................................................................................................  Reviewed by Signature/Title    ...................................................              ..............................................  Date                                                            Time

## 2017-06-15 NOTE — ED PROVIDER NOTES
History     Chief Complaint:  Chest Pain     HPI   Jayro Elder is a 66 year old male on Eliquis, Plavix, and Aspirin 81 who presents to the emergency department today for evaluation of chest pain. The patient had cardiac stenting performed on Tuesday, 06/13/2017, and went home that same day. The patient reports that this was his seventh stent. Last night at 2230 the patient developed some intrascapular back pain and some chest pain. Today, his pain was worse so after having labs drawn at the heart clinic in [place], [place] the patient came here to the emergency department rather than going to his cardiac rehab. He states that his chest pain is exacerbated when he breathes deeply and when he exerts himself and otherwise he rates his pain as 3/10 in severity; similar to previous. The patient also endorses some bilateral arm weakness. He denies any diaphoresis, cough, chills, or fevers. The patient took Imdur this morning with no relief of his pain. Of note, the patient has a history of a stroke and has a visual deficit in his right field of vision.     Allergies:  No Known Drug Allergies    Medications:    Aspirin  Plavix  Cholecalciferol  Eliquis  Enoxaparin  Lisinopril  Imdur  Zocor  Metoprolol  Nitrostat  Norvasc  Insulin  Protonix  Glucophage  Levothyroxine    Past Medical History:    Coronary Artery Disease   Cardiomyopathy  Essential hypertension, benign  Generalized osteoarthritis, unspecified site  Myocardial infarction   Neuropathy  Tobacco use disorder  Type II or unspecified type diabetes mellitus without mention of complication, not stated as uncontrolled    Past Surgical History:    Angiogram, subtotal occlusion mid LAD, SUSAN mid LAD  Angiogram, mild CAD,patent stent,no flow-limiting lesions,sev. LV dysf.LAD enlarged  Angiogram, Sev.single vessel disease,Mod LV dysf.distal LAD 90%,70-75% mid lad just before prev stent,SUSAN to prox.mid LAD, endeavor to distal LAD  Angiogram, restenosis, stent  "LAD  Laminectomy x 3  Bunionectomy  Gingival surgery   Heart catheterization left heart catheterization, SUSAN to OM3    Family History:    Sister: Colorectal Cancer  Brother: Thyroid Disease, Cardiovascular, Diabetes  Father: Cancer  Mother: Arthritis, Thyroid Disease, Diabetes     Social History:  Smoking Status: Former Smoker -- 1.00 Packs Per Day for 25 years  -- Quit Date:  07/25/2005  Smokeless Tobacco: Never Used  Alcohol Use: Positive  Marital Status:   [2]     Review of Systems   Constitutional: Negative for chills, diaphoresis and fever.   Respiratory: Negative for cough.    Cardiovascular: Positive for chest pain.   Musculoskeletal: Positive for back pain.   Neurological: Positive for weakness (Bilateral arms).   All other systems reviewed and are negative.    Physical Exam   Vitals:  Patient Vitals for the past 24 hrs:   BP Temp Temp src Pulse Heart Rate Resp SpO2 Height Weight   06/15/17 2217 137/79 - - - 68 - 97 % - -   06/15/17 2212 126/87 - - - 69 - 97 % - -   06/15/17 2205 131/86 - - - 72 - - - -   06/15/17 2159 (!) 143/91 - - - - - - - -   06/15/17 2035 141/88 - - - - - - - -   06/15/17 1906 (!) 146/102 - - - 67 18 98 % - -   06/15/17 1854 - - - - - 16 - - -   06/15/17 1830 (!) 152/95 - - - 63 11 98 % - -   06/15/17 1801 - - - - 71 12 97 % - -   06/15/17 1800 (!) 143/93 - - - 72 13 - - -   06/15/17 1730 (!) 134/100 - - - 68 8 95 % - -   06/15/17 1700 (!) 131/96 - - - 68 9 96 % - -   06/15/17 1630 122/87 - - - 70 9 93 % - -   06/15/17 1621 132/89 - - - 69 16 - - -   06/15/17 1613 - - - - - 23 97 % - -   06/15/17 1600 131/88 - - - 63 17 96 % - -   06/15/17 1522 (!) 151/91 97.9  F (36.6  C) Oral 71 - 16 98 % 1.93 m (6' 4\") 104.3 kg (230 lb)     Physical Exam  General: Alert and cooperative with exam. Patient in mild distress. Normal mentation.  Head:  Scalp is NC/AT  Eyes:  No scleral icterus, PERRL  ENT:  The external nose and ears are normal. The oropharynx is normal and without erythema; mucus " membranes are moist.   Neck:  Normal range of motion without rigidity.  CV:  Irregular rate and rhythm  Resp:  Breath sounds are clear bilaterally    Non-labored, no retractions or accessory muscle use  GI:  Abdomen is soft, no distension, no tenderness. No peritoneal signs. Obese.   MS:  No lower extremity edema   Skin:  Warm and dry, No rash or lesions noted. Bandage present over left wrist.   Neuro: Oriented x 3. No gross motor deficits.    Emergency Department Course     ECG:  ECG taken at 1523, ECG read at 1551  Atrial fibrillation   Left axis deviation  Right bundle branch block  Anteroseptal infarct, age undetermined  Abnormal ECG  Rate 68 bpm. MN interval * ms. QRS duration 134 ms. QT/QTc 432/459 ms. P-R-T axes * -33 -24.    Imaging:  Radiology findings were communicated with the patient who voiced understanding of the findings.    XR Chest 2 Views  The lungs are clear. No focal pulmonary opacities. Heart  and mediastinum are unremarkable. No acute cardiopulmonary  abnormalities. Old left rib fractures.  MAHI CLOE MD  Reading per radiology    Laboratory:  Laboratory findings were communicated with the patient who voiced understanding of the findings.    Glucose by Meter (Collected 1916): 305 (H)   CBC: WBC 5.8, HGB 12.7 (L),   BMP: Glucose 319 (H) o/w WNL (Creatinine 0.85)  Troponin I (Collected 1533): 0.961 (HH)      Interventions:  1622 Nitrostat 0.4 mg Sublingual  1648 Nitrostat 0.4 mg Sublingual  1723 Nitrostat 0.4 mg Sublingual    Emergency Department Course:  Nursing notes and vitals reviewed.  I performed an exam of the patient as documented above.   IV was inserted and blood was drawn for laboratory testing, results above.  The patient was sent for a XR Chest 2 Views while in the emergency department, results above.   I discussed the treatment plan with the patient. They expressed understanding of this plan and consented to admission. I discussed the patient with Dr. Oliverio Noel,  who will admit the patient to a monitored bed for further evaluation and treatment.  I personally reviewed the laboratory and imaging results with the patient and answered all related questions prior to admission.    Impression & Plan      Medical Decision Making:  Jayro Elder is a 66 year old male who presents with mid thoracic back pain as well as chest pain; history of recent cardiac stenting two days ago. Patient's medical history and records were reviewed. Initial consideration for, but not limited to, ACS/MI, aortic dissection, esophageal spasm/GERD, angina/unstable angina, musculoskeletal pain, infectious process, among others. Labs, imaging, EKG obtained. EKG demonstrates known atrial fibrillation without evidence of acute ischemia or infarction; no significant change from previous. Chest x-ray unremarkable with no evidence of mediastinal widening. Patient describes back pain as similar to previous MI's; low suspicion for aortic dissection at this time. Labs notable for significantly elevated troponin (0.961). Patient is anticoagulated on Aspirin, Eliquis, Plavix. No additional anticoagulation was indicated at this time. Chest x-ray unremarkable. Given concern for ACS or stent occlusion, patient will be admitted to the hospitalist service for further evaluation and care. He did received nitro x 3 with resolution of symptoms here in the emergency department. At the time of admission, the patient was hemodynamically stable and asymptomatic.     Diagnosis:    ICD-10-CM    1. Chest pain R07.9 Troponin I     Glucose by meter     Glucose by meter     Glucose by meter     Glucose by meter     Disposition:   The patient is admitted into the care of Dr. Oliverio Noel.    Scribe Disclosure:  I, Danilo Braga, am serving as a scribe at 3:54 PM on 6/15/2017 to document services personally performed by Manoj Dejesus DO, based on my observations and the provider's statements to me.     EMERGENCY  DEPARTMENT       [place], Manoj Rizvi, DO  06/15/17 6299

## 2017-06-15 NOTE — PROGRESS NOTES
Post heart cath BMP order entered.  Pt had cath on 6/13 and was instructed to hold metformin until lab draw.  KMortimer RN

## 2017-06-15 NOTE — IP AVS SNAPSHOT
MRN:[id number]                      After Visit Summary   6/15/2017    Jayro Elder    MRN: [id number]           Thank you!     Thank you for choosing [facility] for your care. Our goal is always to provide you with excellent care. Hearing back from our patients is one way we can continue to improve our services. Please take a few minutes to complete the written survey that you may receive in the mail after you visit with us. Thank you!        Patient Information     Date Of Birth          1950        Designated Caregiver       Most Recent Value    Caregiver    Will someone help with your care after discharge? no      About your hospital stay     You were admitted on:  Jackelin 15, 2017 You last received care in the:  [facility]    You were discharged on:  June 17, 2017        Reason for your hospital stay       Chest pain, with patent OM stent                  Who to Call     For medical emergencies, please call 911.  For non-urgent questions about your medical care, please call your primary care provider or clinic, [phone]          Attending Provider     Provider Specialty    Manoj Dejesus DO Emergency Medicine    Oliverio Noel MD Internal Medicine       Primary Care Provider Office Phone # Fax #    Davion Cordova PA-C [phone]      After Care Instructions     Activity       Your activity upon discharge: activity as tolerated            Diet       Follow this diet upon discharge: Orders Placed This Encounter      Low Saturated Fat Na <2400 mg                    Follow-up Appointments     Follow Up and recommended labs and tests       Follow up with primary care provider, Davion Cordova, within 7 days for hospital follow- up.                  Your next 10 appointments already scheduled     Jun 19, 2017  1:45 PM CDT   [facility]    [address] [address]   [phone]            Jun 20, 2017  2:00 PM CDT   Neuro Treatment with Vivian Plunkett, SLP   [facility] ([facility])    [address] [address]   [phone]            Jun 20, 2017  3:15 PM CDT   Neuro Treatment with Chloé Luu, OT   [facility] ([facility])    [address] [address]   [phone]            Jun 20, 2017  4:15 PM CDT   Neuro Treatment with Patsy Martinez, ALEXANDRA   [facility] ([facility])    [address] [address]   [phone]            Jun 22, 2017  7:45 AM CDT   Return Visit with Johnathan Mckeon MD   [facility] ([address] [address] [phone]            Jun 22, 2017  2:15 PM CDT   Neuro Treatment with Patsy Martinez, ALEXANDRA   [facility] ([facility])    [address] [id number] [phone]            Jun 22, 2017  3:00 PM CDT   Neuro Treatment with MARY ELLEN Moreno   [facility] ([facility])    [address] [id number] [phone]            Jun 23, 2017  1:20 PM CDT   Office Visit with Davion Cordova PA-C   [facility]    [address] [address] [phone]           Bring a current list of meds and any records pertaining to this visit.  For Physicals, please bring immunization records and any forms needing to be filled out.  Please arrive 10 minutes early to complete paperwork.            Jun 26, 2017  1:45 PM CDT   Neuro Treatment with Chloé Luu OT   [facility] ([facility])    [address]  [facility] [address]   [phone]            Jun 28, 2017  2:15 PM CDT   Neuro Treatment with Denisa Hannon PT   [facility] ([facility])    [address] [facility] [address]   [phone]              Further instructions from your care team         Going Home after an Angioplasty or Stent Placement (Cardiac)  ______________________________________________    Patient Name: Jayro Elder  Date of Procedure: June 17, 2017    Doctor: ***     After you go home:    Have an adult stay with you for 24 hours.    Drink plenty of fluids.    You may eat your normal diet, unless your doctor tells you otherwise.    For 24 hours:    Relax and take it easy.    Do NOT smoke.    Do NOT make any important or legal decisions.    Do NOT drive or operate machines at home or at work.    Do NOT drink alcohol.    Remove the Band-Aid after 24 hours. If there is minor oozing, apply another Band-aid and remove it after 12 hours.    For 2 days, do NOT have sex or do any heavy exercise.    Do NOT take a bath, or use a hot tub or pool for at least 3 days. You may shower.    Care of wrist or arm site  It is normal to have soreness at the puncture site and mild tingling in your hand for up to 3 days.    For 2 days, do not use your hand or arm to support your weight (such as rising from a chair) or bend your wrist (such as lifting a garage door).    For 2 days, do not lift more than 5 pounds or exercise your arm (tennis, golf or bowling).    If you start bleeding from the site in your arm:    Sit down and press firmly on the site with your fingers for 10 minutes. Call your doctor as soon as you can.    If the bleeding stops, sit still and keep your wrist straight for 2 hours.    Medicines    If you have started taking Plavix or Effient, do not stop taking it until you talk to your heart doctor (cardiologist).    If you are on metformin (Glucophage), do not restart it  "until you have blood tests (within 2 to 3 days after discharge). When your doctor tells you it is safe, you may restart the metformin.    If you have stopped any other medicines, check with your nurse or provider about when to restart them.    Call 911 right away if you have bleeding that is heavy or does not stop.    Call your doctor if:    You have a large or growing hard lump around the site.    The site is red, swollen, hot or tender.    Blood or fluid is draining from the site.    You have chills or a fever greater than 101 F (38 C).    Your leg or arm feels numb or cool.    You have hives, a rash or unusual itching.      [facility] [place]:  [phone] (7 days a week)      We will contact you tomorrow for follow-up. Number where we can reach you: ***      Pending Results     Date and Time Order Name Status Description    6/15/2017 2135 EKG 12-lead, tracing only Preliminary     6/13/2017 1431 EKG 12-lead, tracing only  Preliminary     6/13/2017 0945 EKG 12-lead, tracing only Preliminary             Statement of Approval     Ordered          06/17/17 1142  I have reviewed and agree with all the recommendations and orders detailed in this document.  EFFECTIVE NOW     Approved and electronically signed by:  Venkata Callejas MD             Admission Information     Date & Time Provider Department Dept. Phone    6/15/2017 Oliverio Noel MD [facility] [phone]      Your Vitals Were     Blood Pressure Pulse Temperature Respirations Height Weight    139/87 (BP Location: Right arm) 71 98.5  F (36.9  C) (Oral) 16 1.93 m (6' 4\") 101.4 kg (223 lb 8.7 oz)    Pulse Oximetry BMI (Body Mass Index)                97% 27.21 kg/m2          MyChart Information     [employer] lets you send messages to your doctor, view your test results, renew your prescriptions, schedule appointments and more. To sign up, go to [email or web address] . Click on \"Log in\" on " "the left side of the screen, which will take you to the Welcome page. Then click on \"Sign up Now\" on the right side of the page.     You will be asked to enter the access code listed below, as well as some personal information. Please follow the directions to create your username and password.     Your access code is: [email or web address]  Expires: 2017 11:13 AM     Your access code will  in 90 days. If you need help or a new code, please call your [facility] or [phone].        Care EveryWhere ID     This is your Care EveryWhere ID. This could be used by other organizations to access your [facility] medical records  [id number]           Review of your medicines      CONTINUE these medicines which have NOT CHANGED        Dose / Directions    amLODIPine 5 MG tablet   Commonly known as:  NORVASC   Used for:  Coronary artery disease involving native coronary artery of native heart with angina pectoris (H)        Dose:  5 mg   Take 1 tablet (5 mg) by mouth 2 times daily   Quantity:  180 tablet   Refills:  3       ASPIRIN PO        Dose:  81 mg   Take 81 mg by mouth   Refills:  0       Blood Pressure Monitor Kit   Used for:  Hypertension goal BP (blood pressure) < 140/90        Use to monitor blood pressure daily or as directed   Quantity:  1 kit   Refills:  0       clopidogrel 75 MG tablet   Commonly known as:  PLAVIX   Used for:  Coronary artery disease involving native coronary artery of native heart with other form of angina pectoris (H)        Dose:  75 mg   Take 1 tablet (75 mg) by mouth daily   Quantity:  90 tablet   Refills:  3       D3 ADULT PO        Dose:  2000 Units   Take 2,000 Units by mouth daily   Refills:  0       ELIQUIS PO        Dose:  2.5 mg   Take 2.5 mg by mouth 2 times daily   Refills:  0       insulin glargine 100 UNIT/ML injection   Commonly known as:  LANTUS        Dose:  40 Units   Inject 40 Units Subcutaneous every 24 hours Daily at 1700   Refills:  0       isosorbide mononitrate " 30 MG 24 hr tablet   Commonly known as:  IMDUR   Used for:  Hypertension goal BP (blood pressure) < 140/90        Dose:  30 mg   Take 1 tablet (30 mg) by mouth daily   Quantity:  90 tablet   Refills:  3       levothyroxine 137 MCG tablet   Commonly known as:  SYNTHROID/LEVOTHROID        Dose:  137 mcg   Take 137 mcg by mouth At Bedtime   Quantity:  90 tablet   Refills:  3       lisinopril 20 MG tablet   Commonly known as:  PRINIVIL/ZESTRIL        Dose:  20 mg   Take 20 mg by mouth 2 times daily   Refills:  0       metFORMIN 1000 MG tablet   Commonly known as:  GLUCOPHAGE   Used for:  Type II or unspecified type diabetes mellitus without mention of complication, not stated as uncontrolled        Dose:  1000 mg   Take 1 tablet (1,000 mg) by mouth 2 times daily (with meals)   Quantity:  60 tablet   Refills:  0       metoprolol 100 MG 24 hr tablet   Commonly known as:  TOPROL-XL   Used for:  Coronary artery disease involving native coronary artery of native heart without angina pectoris        Dose:  100 mg   Take 1 tablet (100 mg) by mouth At Bedtime   Quantity:  90 tablet   Refills:  3       nitroglycerin 0.4 MG sublingual tablet   Commonly known as:  NITROSTAT   Used for:  Other chest pain        Dose:  0.4 mg   Place 1 tablet (0.4 mg) under the tongue every 5 minutes as needed for chest pain   Quantity:  50 tablet   Refills:  0       ONE TOUCH LANCETS Misc        use qid   Quantity:  120   Refills:  4       ONE TOUCH ULTRA test strip   Used for:  Type II or unspecified type diabetes mellitus without mention of complication, not stated as uncontrolled   Generic drug:  blood glucose monitoring        use qid   Quantity:  120   Refills:  11       order for DME   Used for:  Hemorrhagic stroke (H)        Equipment being ordered: Cane () Treatment Diagnosis: Impaired balance   Quantity:  1 each   Refills:  0       pantoprazole 40 MG EC tablet   Commonly known as:  PROTONIX   Used for:  GERD (gastroesophageal reflux  disease)        Dose:  40 mg   Take 1 tablet (40 mg) by mouth every morning (before breakfast)   Quantity:  30 tablet   Refills:  0       simvastatin 40 MG tablet   Commonly known as:  ZOCOR   Used for:  Coronary artery disease involving native coronary artery of native heart without angina pectoris        Dose:  40 mg   Take 1 tablet (40 mg) by mouth At Bedtime   Quantity:  90 tablet   Refills:  3                Protect others around you: Learn how to safely use, store and throw away your medicines at www.disposemymeds.org.             Medication List: This is a list of all your medications and when to take them. Check marks below indicate your daily home schedule. Keep this list as a reference.      Medications           Morning Afternoon Evening Bedtime As Needed    amLODIPine 5 MG tablet   Commonly known as:  NORVASC   Take 1 tablet (5 mg) by mouth 2 times daily   Last time this was given:  5 mg on 6/17/2017  9:27 AM   Next Dose Due:  Today evening on 6/17/17                                      ASPIRIN PO   Take 81 mg by mouth   Last time this was given:  81 mg on 6/17/2017  9:28 AM   Next Dose Due:   Tomorrow morning on 6/18/17                                   Blood Pressure Monitor Kit   Use to monitor blood pressure daily or as directed                                clopidogrel 75 MG tablet   Commonly known as:  PLAVIX   Take 1 tablet (75 mg) by mouth daily   Last time this was given:  75 mg on 6/17/2017  9:28 AM   Next Dose Due:   Tomorrow morning on 6/18/17                                   D3 ADULT PO   Take 2,000 Units by mouth daily   Next Dose Due:   Tomorrow morning on 6/18/17                                   ELIQUIS PO   Take 2.5 mg by mouth 2 times daily   Last time this was given:  2.5 mg on 6/17/2017  9:28 AM   Next Dose Due:  Today evening on 6/17/17                                      insulin glargine 100 UNIT/ML injection   Commonly known as:  LANTUS   Inject 40 Units Subcutaneous every 24  hours Daily at 1700   Last time this was given:  40 Units on 6/16/2017  5:22 PM   Next Dose Due:  Today evening on 6/17/17                                   isosorbide mononitrate 30 MG 24 hr tablet   Commonly known as:  IMDUR   Take 1 tablet (30 mg) by mouth daily   Last time this was given:  30 mg on 6/17/2017  9:28 AM   Next Dose Due:   Tomorrow morning on 6/18/17                                   levothyroxine 137 MCG tablet   Commonly known as:  SYNTHROID/LEVOTHROID   Take 137 mcg by mouth At Bedtime   Last time this was given:  137 mcg on 6/16/2017  9:41 PM   Next Dose Due:  Today at bed time on 6/17/18                                   lisinopril 20 MG tablet   Commonly known as:  PRINIVIL/ZESTRIL   Take 20 mg by mouth 2 times daily   Last time this was given:  20 mg on 6/17/2017  9:28 AM   Next Dose Due:  Today evening on 6/17/17                                      metFORMIN 1000 MG tablet   Commonly known as:  GLUCOPHAGE   Take 1 tablet (1,000 mg) by mouth 2 times daily (with meals)   Next Dose Due:  Today evening on 6/17/17                                      metoprolol 100 MG 24 hr tablet   Commonly known as:  TOPROL-XL   Take 1 tablet (100 mg) by mouth At Bedtime   Last time this was given:  100 mg on 6/16/2017  9:43 PM   Next Dose Due:  Today at bed time on 6/17/17                                   nitroglycerin 0.4 MG sublingual tablet   Commonly known as:  NITROSTAT   Place 1 tablet (0.4 mg) under the tongue every 5 minutes as needed for chest pain   Last time this was given:  0.4 mg on 6/15/2017 10:12 PM                                   ONE TOUCH LANCETS Misc   use qid                                ONE TOUCH ULTRA test strip   use qid   Generic drug:  blood glucose monitoring                                order for DME   Equipment being ordered: Cane () Treatment Diagnosis: Impaired balance                                pantoprazole 40 MG EC tablet   Commonly known as:  PROTONIX   Take 1  tablet (40 mg) by mouth every morning (before breakfast)   Last time this was given:  40 mg on 6/17/2017  6:43 AM   Next Dose Due:   Tomorrow morning on 6/18/17                                   simvastatin 40 MG tablet   Commonly known as:  ZOCOR   Take 1 tablet (40 mg) by mouth At Bedtime   Last time this was given:  40 mg on 6/16/2017  9:42 PM   Next Dose Due:  Today at bed time on 6/17/17

## 2017-06-16 ENCOUNTER — APPOINTMENT (OUTPATIENT)
Dept: CARDIOLOGY | Facility: CLINIC | Age: 67
DRG: 287 | End: 2017-06-16
Attending: INTERNAL MEDICINE
Payer: COMMERCIAL

## 2017-06-16 ENCOUNTER — CARE COORDINATION (OUTPATIENT)
Dept: CARE COORDINATION | Facility: CLINIC | Age: 67
End: 2017-06-16

## 2017-06-16 LAB
GLUCOSE BLDC GLUCOMTR-MCNC: 116 MG/DL (ref 70–99)
GLUCOSE BLDC GLUCOMTR-MCNC: 126 MG/DL (ref 70–99)
GLUCOSE BLDC GLUCOMTR-MCNC: 206 MG/DL (ref 70–99)
GLUCOSE BLDC GLUCOMTR-MCNC: 83 MG/DL (ref 70–99)
GLUCOSE BLDC GLUCOMTR-MCNC: 97 MG/DL (ref 70–99)
TROPONIN I SERPL-MCNC: 0.85 UG/L (ref 0–0.04)

## 2017-06-16 PROCEDURE — 27210787 ZZH MANIFOLD CR2

## 2017-06-16 PROCEDURE — 93458 L HRT ARTERY/VENTRICLE ANGIO: CPT | Mod: 26 | Performed by: INTERNAL MEDICINE

## 2017-06-16 PROCEDURE — 93571 IV DOP VEL&/PRESS C FLO 1ST: CPT | Mod: 26 | Performed by: INTERNAL MEDICINE

## 2017-06-16 PROCEDURE — B2151ZZ FLUOROSCOPY OF LEFT HEART USING LOW OSMOLAR CONTRAST: ICD-10-PCS | Performed by: INTERNAL MEDICINE

## 2017-06-16 PROCEDURE — B2111ZZ FLUOROSCOPY OF MULTIPLE CORONARY ARTERIES USING LOW OSMOLAR CONTRAST: ICD-10-PCS | Performed by: INTERNAL MEDICINE

## 2017-06-16 PROCEDURE — 99152 MOD SED SAME PHYS/QHP 5/>YRS: CPT | Performed by: INTERNAL MEDICINE

## 2017-06-16 PROCEDURE — 25000128 H RX IP 250 OP 636: Performed by: INTERNAL MEDICINE

## 2017-06-16 PROCEDURE — 25000132 ZZH RX MED GY IP 250 OP 250 PS 637: Performed by: HOSPITALIST

## 2017-06-16 PROCEDURE — 36415 COLL VENOUS BLD VENIPUNCTURE: CPT | Performed by: HOSPITALIST

## 2017-06-16 PROCEDURE — 84484 ASSAY OF TROPONIN QUANT: CPT | Performed by: HOSPITALIST

## 2017-06-16 PROCEDURE — 27211089 ZZH KIT ACIST INJECTOR CR3

## 2017-06-16 PROCEDURE — 99153 MOD SED SAME PHYS/QHP EA: CPT | Performed by: INTERNAL MEDICINE

## 2017-06-16 PROCEDURE — C1769 GUIDE WIRE: HCPCS

## 2017-06-16 PROCEDURE — 99152 MOD SED SAME PHYS/QHP 5/>YRS: CPT

## 2017-06-16 PROCEDURE — 25000132 ZZH RX MED GY IP 250 OP 250 PS 637: Performed by: INTERNAL MEDICINE

## 2017-06-16 PROCEDURE — 25000131 ZZH RX MED GY IP 250 OP 636 PS 637: Performed by: HOSPITALIST

## 2017-06-16 PROCEDURE — 27210795 ZZH PAD DEFIB QUICK CR4

## 2017-06-16 PROCEDURE — 4A023N7 MEASUREMENT OF CARDIAC SAMPLING AND PRESSURE, LEFT HEART, PERCUTANEOUS APPROACH: ICD-10-PCS | Performed by: INTERNAL MEDICINE

## 2017-06-16 PROCEDURE — 27210946 ZZH KIT HC TOTES DISP CR8

## 2017-06-16 PROCEDURE — 21000001 ZZH R&B HEART CARE

## 2017-06-16 PROCEDURE — 93571 IV DOP VEL&/PRESS C FLO 1ST: CPT

## 2017-06-16 PROCEDURE — 27210914 ZZH SHEATH CR8

## 2017-06-16 PROCEDURE — 4A033BC MEASUREMENT OF ARTERIAL PRESSURE, CORONARY, PERCUTANEOUS APPROACH: ICD-10-PCS | Performed by: INTERNAL MEDICINE

## 2017-06-16 PROCEDURE — 27210856 ZZH ACCESS HEART CATH CR2

## 2017-06-16 PROCEDURE — 99153 MOD SED SAME PHYS/QHP EA: CPT

## 2017-06-16 PROCEDURE — 99223 1ST HOSP IP/OBS HIGH 75: CPT | Mod: 25 | Performed by: INTERNAL MEDICINE

## 2017-06-16 PROCEDURE — 00000146 ZZHCL STATISTIC GLUCOSE BY METER IP

## 2017-06-16 PROCEDURE — 99231 SBSQ HOSP IP/OBS SF/LOW 25: CPT | Performed by: INTERNAL MEDICINE

## 2017-06-16 PROCEDURE — 93458 L HRT ARTERY/VENTRICLE ANGIO: CPT

## 2017-06-16 PROCEDURE — 25000125 ZZHC RX 250: Performed by: INTERNAL MEDICINE

## 2017-06-16 RX ORDER — SODIUM CHLORIDE 9 MG/ML
INJECTION, SOLUTION INTRAVENOUS CONTINUOUS
Status: DISCONTINUED | OUTPATIENT
Start: 2017-06-16 | End: 2017-06-17 | Stop reason: HOSPADM

## 2017-06-16 RX ORDER — NALOXONE HYDROCHLORIDE 0.4 MG/ML
.2-.4 INJECTION, SOLUTION INTRAMUSCULAR; INTRAVENOUS; SUBCUTANEOUS
Status: ACTIVE | OUTPATIENT
Start: 2017-06-16 | End: 2017-06-17

## 2017-06-16 RX ORDER — ACETAMINOPHEN 325 MG/1
325-650 TABLET ORAL EVERY 4 HOURS PRN
Status: DISCONTINUED | OUTPATIENT
Start: 2017-06-16 | End: 2017-06-16

## 2017-06-16 RX ORDER — NALOXONE HYDROCHLORIDE 0.4 MG/ML
.1-.4 INJECTION, SOLUTION INTRAMUSCULAR; INTRAVENOUS; SUBCUTANEOUS
Status: DISCONTINUED | OUTPATIENT
Start: 2017-06-16 | End: 2017-06-17 | Stop reason: HOSPADM

## 2017-06-16 RX ORDER — LORAZEPAM 0.5 MG/1
0.5 TABLET ORAL
Status: DISCONTINUED | OUTPATIENT
Start: 2017-06-16 | End: 2017-06-16 | Stop reason: HOSPADM

## 2017-06-16 RX ORDER — LORAZEPAM 2 MG/ML
.5-2 INJECTION INTRAMUSCULAR EVERY 4 HOURS PRN
Status: DISCONTINUED | OUTPATIENT
Start: 2017-06-16 | End: 2017-06-16 | Stop reason: HOSPADM

## 2017-06-16 RX ORDER — PHENYLEPHRINE HCL IN 0.9% NACL 1 MG/10 ML
20-100 SYRINGE (ML) INTRAVENOUS
Status: DISCONTINUED | OUTPATIENT
Start: 2017-06-16 | End: 2017-06-16 | Stop reason: HOSPADM

## 2017-06-16 RX ORDER — ENALAPRILAT 1.25 MG/ML
1.25-2.5 INJECTION INTRAVENOUS
Status: DISCONTINUED | OUTPATIENT
Start: 2017-06-16 | End: 2017-06-16 | Stop reason: HOSPADM

## 2017-06-16 RX ORDER — ATROPINE SULFATE 0.1 MG/ML
0.5 INJECTION INTRAVENOUS EVERY 5 MIN PRN
Status: ACTIVE | OUTPATIENT
Start: 2017-06-16 | End: 2017-06-17

## 2017-06-16 RX ORDER — LIDOCAINE 40 MG/G
CREAM TOPICAL
Status: DISCONTINUED | OUTPATIENT
Start: 2017-06-16 | End: 2017-06-17 | Stop reason: HOSPADM

## 2017-06-16 RX ORDER — SODIUM CHLORIDE 9 MG/ML
INJECTION, SOLUTION INTRAVENOUS CONTINUOUS
Status: DISCONTINUED | OUTPATIENT
Start: 2017-06-16 | End: 2017-06-16 | Stop reason: HOSPADM

## 2017-06-16 RX ORDER — POTASSIUM CHLORIDE 29.8 MG/ML
20 INJECTION INTRAVENOUS
Status: DISCONTINUED | OUTPATIENT
Start: 2017-06-16 | End: 2017-06-16 | Stop reason: HOSPADM

## 2017-06-16 RX ORDER — NITROGLYCERIN 0.4 MG/1
0.4 TABLET SUBLINGUAL EVERY 5 MIN PRN
Status: DISCONTINUED | OUTPATIENT
Start: 2017-06-16 | End: 2017-06-16 | Stop reason: HOSPADM

## 2017-06-16 RX ORDER — POTASSIUM CHLORIDE 1500 MG/1
20 TABLET, EXTENDED RELEASE ORAL
Status: DISCONTINUED | OUTPATIENT
Start: 2017-06-16 | End: 2017-06-16 | Stop reason: HOSPADM

## 2017-06-16 RX ORDER — METHYLPREDNISOLONE SODIUM SUCCINATE 125 MG/2ML
125 INJECTION, POWDER, LYOPHILIZED, FOR SOLUTION INTRAMUSCULAR; INTRAVENOUS
Status: DISCONTINUED | OUTPATIENT
Start: 2017-06-16 | End: 2017-06-16 | Stop reason: HOSPADM

## 2017-06-16 RX ORDER — DIPHENHYDRAMINE HYDROCHLORIDE 50 MG/ML
25-50 INJECTION INTRAMUSCULAR; INTRAVENOUS
Status: DISCONTINUED | OUTPATIENT
Start: 2017-06-16 | End: 2017-06-16 | Stop reason: HOSPADM

## 2017-06-16 RX ORDER — NIFEDIPINE 10 MG/1
10 CAPSULE ORAL
Status: DISCONTINUED | OUTPATIENT
Start: 2017-06-16 | End: 2017-06-16 | Stop reason: HOSPADM

## 2017-06-16 RX ORDER — EPTIFIBATIDE 2 MG/ML
2 INJECTION, SOLUTION INTRAVENOUS CONTINUOUS PRN
Status: DISCONTINUED | OUTPATIENT
Start: 2017-06-16 | End: 2017-06-16 | Stop reason: HOSPADM

## 2017-06-16 RX ORDER — PROTAMINE SULFATE 10 MG/ML
25-100 INJECTION, SOLUTION INTRAVENOUS EVERY 5 MIN PRN
Status: DISCONTINUED | OUTPATIENT
Start: 2017-06-16 | End: 2017-06-16 | Stop reason: HOSPADM

## 2017-06-16 RX ORDER — DEXTROSE MONOHYDRATE 25 G/50ML
12.5-5 INJECTION, SOLUTION INTRAVENOUS EVERY 30 MIN PRN
Status: DISCONTINUED | OUTPATIENT
Start: 2017-06-16 | End: 2017-06-16 | Stop reason: HOSPADM

## 2017-06-16 RX ORDER — NICARDIPINE HYDROCHLORIDE 2.5 MG/ML
100 INJECTION INTRAVENOUS
Status: DISCONTINUED | OUTPATIENT
Start: 2017-06-16 | End: 2017-06-16 | Stop reason: HOSPADM

## 2017-06-16 RX ORDER — HYDRALAZINE HYDROCHLORIDE 20 MG/ML
10-20 INJECTION INTRAMUSCULAR; INTRAVENOUS
Status: DISCONTINUED | OUTPATIENT
Start: 2017-06-16 | End: 2017-06-16 | Stop reason: HOSPADM

## 2017-06-16 RX ORDER — METOPROLOL TARTRATE 1 MG/ML
5 INJECTION, SOLUTION INTRAVENOUS EVERY 5 MIN PRN
Status: DISCONTINUED | OUTPATIENT
Start: 2017-06-16 | End: 2017-06-16 | Stop reason: HOSPADM

## 2017-06-16 RX ORDER — NALOXONE HYDROCHLORIDE 0.4 MG/ML
0.4 INJECTION, SOLUTION INTRAMUSCULAR; INTRAVENOUS; SUBCUTANEOUS EVERY 5 MIN PRN
Status: DISCONTINUED | OUTPATIENT
Start: 2017-06-16 | End: 2017-06-16 | Stop reason: HOSPADM

## 2017-06-16 RX ORDER — DOBUTAMINE HYDROCHLORIDE 200 MG/100ML
2-20 INJECTION INTRAVENOUS CONTINUOUS PRN
Status: DISCONTINUED | OUTPATIENT
Start: 2017-06-16 | End: 2017-06-16 | Stop reason: HOSPADM

## 2017-06-16 RX ORDER — ATROPINE SULFATE 0.1 MG/ML
.5-1 INJECTION INTRAVENOUS
Status: DISCONTINUED | OUTPATIENT
Start: 2017-06-16 | End: 2017-06-16 | Stop reason: HOSPADM

## 2017-06-16 RX ORDER — CLOPIDOGREL BISULFATE 75 MG/1
75 TABLET ORAL
Status: DISCONTINUED | OUTPATIENT
Start: 2017-06-16 | End: 2017-06-16 | Stop reason: HOSPADM

## 2017-06-16 RX ORDER — ADENOSINE 3 MG/ML
12-12000 INJECTION, SOLUTION INTRAVENOUS
Status: DISCONTINUED | OUTPATIENT
Start: 2017-06-16 | End: 2017-06-16 | Stop reason: HOSPADM

## 2017-06-16 RX ORDER — MORPHINE SULFATE 2 MG/ML
1-2 INJECTION, SOLUTION INTRAMUSCULAR; INTRAVENOUS EVERY 5 MIN PRN
Status: DISCONTINUED | OUTPATIENT
Start: 2017-06-16 | End: 2017-06-16 | Stop reason: HOSPADM

## 2017-06-16 RX ORDER — NITROGLYCERIN 5 MG/ML
100-200 VIAL (ML) INTRAVENOUS
Status: DISCONTINUED | OUTPATIENT
Start: 2017-06-16 | End: 2017-06-16 | Stop reason: HOSPADM

## 2017-06-16 RX ORDER — ASPIRIN 81 MG/1
81 TABLET ORAL DAILY
Status: DISCONTINUED | OUTPATIENT
Start: 2017-06-17 | End: 2017-06-17 | Stop reason: HOSPADM

## 2017-06-16 RX ORDER — SODIUM NITROPRUSSIDE 25 MG/ML
100-200 INJECTION INTRAVENOUS
Status: DISCONTINUED | OUTPATIENT
Start: 2017-06-16 | End: 2017-06-16 | Stop reason: HOSPADM

## 2017-06-16 RX ORDER — VERAPAMIL HYDROCHLORIDE 2.5 MG/ML
1-2.5 INJECTION, SOLUTION INTRAVENOUS
Status: COMPLETED | OUTPATIENT
Start: 2017-06-16 | End: 2017-06-16

## 2017-06-16 RX ORDER — FLUMAZENIL 0.1 MG/ML
0.2 INJECTION, SOLUTION INTRAVENOUS
Status: ACTIVE | OUTPATIENT
Start: 2017-06-16 | End: 2017-06-17

## 2017-06-16 RX ORDER — LORAZEPAM 2 MG/ML
0.5 INJECTION INTRAMUSCULAR
Status: DISCONTINUED | OUTPATIENT
Start: 2017-06-16 | End: 2017-06-16 | Stop reason: HOSPADM

## 2017-06-16 RX ORDER — IOPAMIDOL 755 MG/ML
68 INJECTION, SOLUTION INTRAVASCULAR ONCE
Status: DISCONTINUED | OUTPATIENT
Start: 2017-06-16 | End: 2017-06-17 | Stop reason: HOSPADM

## 2017-06-16 RX ORDER — HEPARIN SODIUM 1000 [USP'U]/ML
1000-10000 INJECTION, SOLUTION INTRAVENOUS; SUBCUTANEOUS EVERY 5 MIN PRN
Status: DISCONTINUED | OUTPATIENT
Start: 2017-06-16 | End: 2017-06-16 | Stop reason: HOSPADM

## 2017-06-16 RX ORDER — POTASSIUM CHLORIDE 7.45 MG/ML
10 INJECTION INTRAVENOUS
Status: DISCONTINUED | OUTPATIENT
Start: 2017-06-16 | End: 2017-06-16 | Stop reason: HOSPADM

## 2017-06-16 RX ORDER — PROTAMINE SULFATE 10 MG/ML
1-5 INJECTION, SOLUTION INTRAVENOUS
Status: DISCONTINUED | OUTPATIENT
Start: 2017-06-16 | End: 2017-06-16 | Stop reason: HOSPADM

## 2017-06-16 RX ORDER — NITROGLYCERIN 5 MG/ML
100-500 VIAL (ML) INTRAVENOUS
Status: COMPLETED | OUTPATIENT
Start: 2017-06-16 | End: 2017-06-16

## 2017-06-16 RX ORDER — CLOPIDOGREL BISULFATE 75 MG/1
300-600 TABLET ORAL
Status: DISCONTINUED | OUTPATIENT
Start: 2017-06-16 | End: 2017-06-16 | Stop reason: HOSPADM

## 2017-06-16 RX ORDER — BUPIVACAINE HYDROCHLORIDE 2.5 MG/ML
1-10 INJECTION, SOLUTION EPIDURAL; INFILTRATION; INTRACAUDAL
Status: DISCONTINUED | OUTPATIENT
Start: 2017-06-16 | End: 2017-06-16 | Stop reason: HOSPADM

## 2017-06-16 RX ORDER — LIDOCAINE HYDROCHLORIDE 10 MG/ML
30 INJECTION, SOLUTION EPIDURAL; INFILTRATION; INTRACAUDAL; PERINEURAL
Status: DISCONTINUED | OUTPATIENT
Start: 2017-06-16 | End: 2017-06-16 | Stop reason: HOSPADM

## 2017-06-16 RX ORDER — ASPIRIN 81 MG/1
81-324 TABLET, CHEWABLE ORAL
Status: DISCONTINUED | OUTPATIENT
Start: 2017-06-16 | End: 2017-06-16 | Stop reason: HOSPADM

## 2017-06-16 RX ORDER — ASPIRIN 325 MG
325 TABLET ORAL
Status: DISCONTINUED | OUTPATIENT
Start: 2017-06-16 | End: 2017-06-16 | Stop reason: HOSPADM

## 2017-06-16 RX ORDER — PRASUGREL 10 MG/1
10-60 TABLET, FILM COATED ORAL
Status: DISCONTINUED | OUTPATIENT
Start: 2017-06-16 | End: 2017-06-16 | Stop reason: HOSPADM

## 2017-06-16 RX ORDER — ONDANSETRON 2 MG/ML
4 INJECTION INTRAMUSCULAR; INTRAVENOUS EVERY 4 HOURS PRN
Status: DISCONTINUED | OUTPATIENT
Start: 2017-06-16 | End: 2017-06-16 | Stop reason: HOSPADM

## 2017-06-16 RX ORDER — NITROGLYCERIN 20 MG/100ML
.07-2 INJECTION INTRAVENOUS CONTINUOUS PRN
Status: DISCONTINUED | OUTPATIENT
Start: 2017-06-16 | End: 2017-06-16 | Stop reason: HOSPADM

## 2017-06-16 RX ORDER — FENTANYL CITRATE 50 UG/ML
25-50 INJECTION, SOLUTION INTRAMUSCULAR; INTRAVENOUS
Status: ACTIVE | OUTPATIENT
Start: 2017-06-16 | End: 2017-06-17

## 2017-06-16 RX ORDER — HYDROCODONE BITARTRATE AND ACETAMINOPHEN 5; 325 MG/1; MG/1
1-2 TABLET ORAL EVERY 4 HOURS PRN
Status: DISCONTINUED | OUTPATIENT
Start: 2017-06-16 | End: 2017-06-17 | Stop reason: HOSPADM

## 2017-06-16 RX ORDER — FLUMAZENIL 0.1 MG/ML
0.2 INJECTION, SOLUTION INTRAVENOUS
Status: DISCONTINUED | OUTPATIENT
Start: 2017-06-16 | End: 2017-06-16 | Stop reason: HOSPADM

## 2017-06-16 RX ORDER — FUROSEMIDE 10 MG/ML
20-100 INJECTION INTRAMUSCULAR; INTRAVENOUS
Status: DISCONTINUED | OUTPATIENT
Start: 2017-06-16 | End: 2017-06-16 | Stop reason: HOSPADM

## 2017-06-16 RX ORDER — DOPAMINE HYDROCHLORIDE 160 MG/100ML
2-20 INJECTION, SOLUTION INTRAVENOUS CONTINUOUS PRN
Status: DISCONTINUED | OUTPATIENT
Start: 2017-06-16 | End: 2017-06-16 | Stop reason: HOSPADM

## 2017-06-16 RX ORDER — EPTIFIBATIDE 2 MG/ML
180 INJECTION, SOLUTION INTRAVENOUS EVERY 10 MIN PRN
Status: DISCONTINUED | OUTPATIENT
Start: 2017-06-16 | End: 2017-06-16 | Stop reason: HOSPADM

## 2017-06-16 RX ORDER — LIDOCAINE HYDROCHLORIDE 10 MG/ML
1-10 INJECTION, SOLUTION EPIDURAL; INFILTRATION; INTRACAUDAL; PERINEURAL
Status: COMPLETED | OUTPATIENT
Start: 2017-06-16 | End: 2017-06-16

## 2017-06-16 RX ORDER — PROMETHAZINE HYDROCHLORIDE 25 MG/ML
6.25-25 INJECTION, SOLUTION INTRAMUSCULAR; INTRAVENOUS EVERY 4 HOURS PRN
Status: DISCONTINUED | OUTPATIENT
Start: 2017-06-16 | End: 2017-06-16 | Stop reason: HOSPADM

## 2017-06-16 RX ORDER — FENTANYL CITRATE 50 UG/ML
25-50 INJECTION, SOLUTION INTRAMUSCULAR; INTRAVENOUS
Status: DISCONTINUED | OUTPATIENT
Start: 2017-06-16 | End: 2017-06-16 | Stop reason: HOSPADM

## 2017-06-16 RX ORDER — ACETAMINOPHEN 325 MG/1
650 TABLET ORAL ONCE
Status: COMPLETED | OUTPATIENT
Start: 2017-06-16 | End: 2017-06-16

## 2017-06-16 RX ORDER — LIDOCAINE 40 MG/G
CREAM TOPICAL
Status: DISCONTINUED | OUTPATIENT
Start: 2017-06-16 | End: 2017-06-16 | Stop reason: HOSPADM

## 2017-06-16 RX ADMIN — LISINOPRIL 20 MG: 20 TABLET ORAL at 21:43

## 2017-06-16 RX ADMIN — ISOSORBIDE MONONITRATE 30 MG: 30 TABLET, EXTENDED RELEASE ORAL at 09:01

## 2017-06-16 RX ADMIN — ACETAMINOPHEN 650 MG: 325 TABLET, FILM COATED ORAL at 10:28

## 2017-06-16 RX ADMIN — LEVOTHYROXINE SODIUM 137 MCG: 112 TABLET ORAL at 21:41

## 2017-06-16 RX ADMIN — LIDOCAINE HYDROCHLORIDE 10 MG: 10 INJECTION, SOLUTION EPIDURAL; INFILTRATION; INTRACAUDAL; PERINEURAL at 14:46

## 2017-06-16 RX ADMIN — SIMVASTATIN 40 MG: 40 TABLET, FILM COATED ORAL at 21:42

## 2017-06-16 RX ADMIN — ASPIRIN 81 MG 81 MG: 81 TABLET ORAL at 09:01

## 2017-06-16 RX ADMIN — NITROGLYCERIN 200 MCG: 5 INJECTION, SOLUTION INTRAVENOUS at 14:48

## 2017-06-16 RX ADMIN — PANTOPRAZOLE SODIUM 40 MG: 40 TABLET, DELAYED RELEASE ORAL at 07:31

## 2017-06-16 RX ADMIN — VERAPAMIL HYDROCHLORIDE 2.5 MG: 2.5 INJECTION, SOLUTION INTRAVENOUS at 14:48

## 2017-06-16 RX ADMIN — FENTANYL CITRATE 50 MCG: 50 INJECTION, SOLUTION INTRAMUSCULAR; INTRAVENOUS at 14:49

## 2017-06-16 RX ADMIN — AMLODIPINE BESYLATE 5 MG: 5 TABLET ORAL at 21:43

## 2017-06-16 RX ADMIN — MIDAZOLAM HYDROCHLORIDE 2 MG: 1 INJECTION, SOLUTION INTRAMUSCULAR; INTRAVENOUS at 14:47

## 2017-06-16 RX ADMIN — SODIUM CHLORIDE: 9 INJECTION, SOLUTION INTRAVENOUS at 16:35

## 2017-06-16 RX ADMIN — INSULIN ASPART 1 UNITS: 100 INJECTION, SOLUTION INTRAVENOUS; SUBCUTANEOUS at 21:47

## 2017-06-16 RX ADMIN — SODIUM CHLORIDE: 9 INJECTION, SOLUTION INTRAVENOUS at 10:52

## 2017-06-16 RX ADMIN — FENTANYL CITRATE 50 MCG: 50 INJECTION, SOLUTION INTRAMUSCULAR; INTRAVENOUS at 14:41

## 2017-06-16 RX ADMIN — LISINOPRIL 20 MG: 20 TABLET ORAL at 09:01

## 2017-06-16 RX ADMIN — CLOPIDOGREL BISULFATE 75 MG: 75 TABLET, FILM COATED ORAL at 09:01

## 2017-06-16 RX ADMIN — INSULIN GLARGINE 40 UNITS: 100 INJECTION, SOLUTION SUBCUTANEOUS at 17:22

## 2017-06-16 RX ADMIN — ADENOSINE 192 MCG: 3 INJECTION, SOLUTION INTRAVENOUS at 15:02

## 2017-06-16 RX ADMIN — AMLODIPINE BESYLATE 5 MG: 5 TABLET ORAL at 09:01

## 2017-06-16 RX ADMIN — METOPROLOL SUCCINATE 100 MG: 100 TABLET, EXTENDED RELEASE ORAL at 21:43

## 2017-06-16 RX ADMIN — ADENOSINE 192 MCG: 3 INJECTION, SOLUTION INTRAVENOUS at 15:01

## 2017-06-16 ASSESSMENT — PAIN DESCRIPTION - DESCRIPTORS
DESCRIPTORS: PRESSURE
DESCRIPTORS: DISCOMFORT

## 2017-06-16 NOTE — H&P
PRIMARY CARE PHYSICIAN:  Dr. Davion Cordova      CHIEF COMPLAINT:  Chest pain.      HISTORY OF PRESENT ILLNESS:  Mr. Jayro Elder is a pleasant 66-year-old male with past medical history as listed below, notably for significant CAD, multiple stents with recent stenting to OM3 on 06/13/2017 and who now presents to the ER today with chest pain.  He has history of significant CAD with recurrent angina with history of LAD SUSAN in 2005, then SUSAN to proximal mid LAD and distal LAD in 2009, restenosis with stent to LAD in 2013.  In 04/2017 he was admitted for subacute left occipital CVA and also noted with new onset atrial fibrillation at that time.  After the discharge, he had some dyspnea on exertion, was seen by Dr. Mckeon on 06/09/2017.  Had a stress test which was abnormal, so he had a cardiac catheterization done 2 days ago on 06/13/2017, which showed severe OM3 stenosis and patent stents to LAD.  He received SUSAN to severe OM3.  He was discharged home on aspirin, Plavix and apixaban.  He states this afternoon he started having some chest pain while at rest, radiating to the back.  He then noted that with walking, his chest pain was getting worse.  He tried to rest.  He had taken a dose of Imdur earlier with no relief in pain.  He was brought to the ER and was seen by Dr. Dejesus.  He got 3 sublingual nitroglycerin which decreased the pain but not completely.  Labs showed elevated troponin of 0.961.  EKG showed no significant changes.  Hospitalist was requested admission for further evaluation.  He denies any fever, chills or rigors.  No shortness of breath, denies pain in abdomen.  Reports normal bowel and bladder habit.      REVIEW OF SYSTEMS:  A 10-point review of systems was done and was negative apart from those mentioned in the history of present illness.      PAST MEDICAL HISTORY:   1.  Uncontrolled diabetes mellitus type 2, last HbA1c from 04/17 was 11.7.   2.  Hypertension.   3.  Dyslipidemia.   4.  CAD with  multiple stents in the past, LAD SUSAN in 2005, SUSAN to proximal mid-LAD and [place] to distal LAD in 2009, restenosis with stent to LAD in 2013, stent to OM3 on 06/13/2017.   5.  Hypothyroidism.   6.  GERD.   7.  Atrial fibrillation.   8.  Subacute left occipital CVA on 04/2017.      MEDICATIONS PRIOR TO ADMISSION:  Prior to Admission medications    Medication Sig Last Dose Taking? Auth Provider   insulin glargine (LANTUS) 100 UNIT/ML injection Inject 40 Units Subcutaneous every 24 hours Daily at 1700  6/14/2017 at 1700 Yes Unknown, Entered By History   Cholecalciferol (D3 ADULT PO) Take 2,000 Units by mouth daily 6/14/2017 at pm Yes Reported, Patient   clopidogrel (PLAVIX) 75 MG tablet Take 1 tablet (75 mg) by mouth daily 6/15/2017 at am Yes Oscar Montaño MD   isosorbide mononitrate (IMDUR) 30 MG 24 hr tablet Take 1 tablet (30 mg) by mouth daily 6/15/2017 at am Yes Anni Bradley APRN CNP   simvastatin (ZOCOR) 40 MG tablet Take 1 tablet (40 mg) by mouth At Bedtime 6/14/2017 at hs Yes Johnathan Mckeon MD   metoprolol (TOPROL-XL) 100 MG 24 hr tablet Take 1 tablet (100 mg) by mouth At Bedtime 6/14/2017 at hs Yes Johnathan Mckeon MD   pantoprazole (PROTONIX) 40 MG enteric coated tablet Take 1 tablet (40 mg) by mouth every morning (before breakfast) 6/15/2017 at am Yes Ana Frankel MD   levothyroxine (SYNTHROID, LEVOTHROID) 137 MCG tablet Take 137 mcg by mouth At Bedtime  6/14/2017 at hs Yes Henrry Cheney PA-C   cephALEXin (KEFLEX) 500 MG capsule Take 1 capsule (500 mg) by mouth 3 times daily   Davion Cordova PA-C   apixaban ANTICOAGULANT (ELIQUIS) 5 MG tablet Take by mouth 2 times daily   Reported, Patient   amLODIPine (NORVASC) 5 MG tablet Take 1 tablet (5 mg) by mouth daily   Johnathan Mckeon MD   lisinopril (PRINIVIL/ZESTRIL) 20 MG tablet Take 20 mg by mouth 2 times daily   Reported, Patient   Blood Pressure Monitor KIT Use to monitor blood pressure daily or  as directed   Davion Cordova PA-C   nitroglycerin (NITROSTAT) 0.4 MG sublingual tablet Place 1 tablet (0.4 mg) under the tongue every 5 minutes as needed for chest pain prn  Davion Cordova PA-C   order for DME Equipment being ordered: Cane ()  Treatment Diagnosis: Impaired balance   Orlando Tate MD   metFORMIN (GLUCOPHAGE) 1000 MG tablet Take 1 tablet (1,000 mg) by mouth 2 times daily (with meals) 6/12/2017  Ana Frankel MD   ONE TOUCH ULTRA TEST VI STRP use Benja Stephenson MD   ONE TOUCH LANCETS MISC use [place] MD          ALLERGIES:  No known drug allergies.      SOCIAL HISTORY:  He quit smoking about 11 years ago.  Prior to that had 25-pack-year smoking history, takes occasional alcohol, no illicit drug use.      FAMILY HISTORY:  Reviewed and not pertinent to current presentation.  His brother had cardiovascular disease, unclear about the details.      PHYSICAL EXAMINATION:   GENERAL:  The patient is conscious, alert, oriented x3, lying comfortably in bed in no apparent distress.   VITAL SIGNS:  Temperature 97.9, heart rate of 71, blood pressure 152/95, saturation 98% on room air.   HEENT:  Pupils are equal and reactive to light and accommodation.  Extraocular movements are intact.  Oral mucosa is moist.   NECK:  Supple, no raised JVD.   RESPIRATORY:  Lungs sounds bilaterally clear to auscultation, no wheezes or crepitation.   CARDIOVASCULAR:  Normal S1, S2, irregularly irregular rhythm, no murmur.   ABDOMEN:  Soft, nontender, nondistended, no guarding, rigidity or rebound tenderness.   LOWER EXTREMITIES:  With no edema.   NEUROLOGIC:  No focal neurological deficits noted.  Cranial nerves II-XII grossly intact.   PSYCHIATRIC:  Normal mood and affect.      LABORATORY DATA:  BMP with sodium 138, potassium 4.2, creatinine 0.85.  Troponin I elevated at 0.961.  Blood glucose elevated at 319.  CBC with WBC of 5.8, hemoglobin 12.7, platelet 220.        IMAGING:  Chest  x-ray reviewed by me shows no acute infiltrates, effusion or pneumothorax.  Old left rib fractures noted.        EKG:  Reviewed by me shows atrial fibrillation with controlled ventricular rate.  No acute ST-T wave changes.      ASSESSMENT AND PLAN:      Mr. Jayro Elder is a 66-year-old male with past medical history significant for coronary artery disease with multiple stents in the past, diabetes, hypertension, dyslipidemia, atrial fibrillation, hypothyroidism, gastroesophageal reflux disease, cerebrovascular accident who presented to the emergency room today with complaints of chest pain.     1. Chest pain, in the setting of significant CAD with multiple stents and recent drug-eluting stent to OM3: Given his significant cardiac history with multiple stents, stent occlusion, and recent stent 2 days ago, I discussed the case with the cardiologist on-call, Dr. Goyal, who suggested that he might need a repeat catheterization.  Will admit him as inpatient.  He is currently more or less chest pain-free.  Will have sublingual nitroglycerin p.r.n.  Will continue with his aspirin, Plavix and apixaban.  Will hold the apixaban in the morning tomorrow for catheterization.  Will monitor him on telemetry.  Will continue with his lisinopril, Toprol-XL, Imdur and statin when verified by pharmacy.  Will monitor serial troponins, but since he is on oral aspirin, Plavix and apixaban, no need for heparin at this time.  This was discussed with Cardiology.     2.  Uncontrolled diabetes mellitus.  His last HbA1c from April was 11.7.  Will give half dose of Lantus tonight as he will be n.p.o. after midnight, will resume his prior to admission Lantus starting tomorrow when verified by pharmacy.  Will hold off on the metformin, will have sliding scale insulin at high resistance.   3.  Hypertension.  Resume prior to admission lisinopril, Toprol-XL when verified.   4.  Dyslipidemia:  Resume statin when verified.   5.  Hypothyroidism.   Resume Synthroid when verified.   6.  Gastroesophageal reflux disease.  Resume Protonix when verified by pharmacy.   7.  History of subacute left occipital stroke 2007.  He has no focal neurological deficits at this time.  Will continue with his aspirin, Plavix, and apixaban.   8.  Atrial fibrillation with controlled ventricular rate.  Will continue with Toprol-XL, resume apixaban when verified, holding tomorrow morning for cardiac catheterization.   9.  Deep venous thrombosis prophylaxis:  He will be on full dose apixaban for his atrial fibrillation.      CODE STATUS:  Full code.         ETHAN WITT MD             D: 06/15/2017 18:52   T: 06/15/2017 20:18   MT: CLAIRE      Name:     PORTER YANCEY   MRN:      [id number]        Account:      [id number]   :      1950           Admitted:     [id number]      Document: [id number]

## 2017-06-16 NOTE — PROCEDURES
"OM stent is widely patent with  JESÚS 3 flow.  LAD stents with 30-50% instent restenosis. JESÚS 2 flow FFR 0.89.  30-40% mid RCA.  LVEDP 23 mmHg. LVEF 40-45% with distal anterior apical sever hypokinesis.    Rec continue med Rx.  Chest pain is probably noncardiac vs. \"stretch discomfort\"  Restart ELIQUIS in AM. Dose should be 5 bid. Why 2.5?  "

## 2017-06-16 NOTE — PROGRESS NOTES
Patient discussed with Dr. Smith. Coronary angiogram showed patent OM stent. LAD stents with 30-50% instent restenosis. FFR 0.89. RCA has mid 30-40% stenosis. Pain felt likely non cardiac in nature.   He should continue with plavix and baby ASA. Would continue with Eliquis at a dose of 2.5 mg bid especiallyin the setting of now having developed a radial hematoma. Tomorrow can start Eliquis 2.5 mg bid. He has follow up already scheduled with Dr. Mckeon next week on the 22nd at 7:45 in the morning. At that time blood thinning regimen can be discussed and potential for coming off ASA in the setting of being on both plavix and Eliquis.     Continue to monitor radial site per nursing protocol. Can go home when radial site is stable and ok with IM.

## 2017-06-16 NOTE — PROGRESS NOTES
A follow-up appointment has been made for this patient.       Jun 23, 2017  1:20 PM CDT   Office Visit with Davion Cordova PA-C   [facility] ([facility])    [address] [address] [facility] [address] [phone]

## 2017-06-16 NOTE — PROGRESS NOTES
"Called to see pt given chest pain. Admitted earlier today for chest pain. At the time of my visit he denied chest pain but felt pain between his shoulders \"worse when I breathe\". EKG done without obvious ischemia. currenlty on ASA, plavix and apixaban as well. Will continue to monitor, no acute interventions at this time.     Venu Carreno M.D.  Hospitalist  Pager [phone]  Text Page until 6 pm (after call answering service)  "

## 2017-06-16 NOTE — PLAN OF CARE
Problem: Goal Outcome Summary  Goal: Goal Outcome Summary  Outcome: No Change  Pt A/OX4, VSS on RA. Tele Afib w/ CVR. Pt c/o upper mid-sternal chest pain radiating to back 5/10 increasing with inspiration and activity. Improved with SL Nitro x3 to 2/10, EKG done with no changes noted. MD aware. Troponin's 0.961, 0.852, 0.853. 0200 glucose 116. LS diminished in bilateral bases. Baseline neuropathy to bilateral feet. Recent CVA with residual right field cut. Stent placed 6/13, left radial site intact. NPO since midnight for possible cardiac cath today pending cardiology consult. Will continue to monitor.

## 2017-06-16 NOTE — PROGRESS NOTES
Clinic Care Coordination Contact    CCRN planned to outreach to patient today, 06/16/2017.   Chart review performed in anticipation of outreach.    Please note - patient is currently admitted (as of 06/15/2017) to -[facility], Chest Pain.    PLAN:  CCRN will continue to monitor for DC and follow up accordingly.    Abril Dawson, RN  [facility]  Clinic Care Coordinator - [place] and [place] Locations   Direct:  [phone] (voicemail available)

## 2017-06-16 NOTE — PROVIDER NOTIFICATION
"Pt c/o upper mid-sternal chest pain 5-6/10 with activity and 4-5/10 at rest, reports pain radiates to mid back, worsens with inspiration, and feels similar to previous heart attack. EKG done with no changes noted and SL Nitro given x3 with improvement in pain to 2/10. Patient reports chest pain does continue to \"jump up\" with activity after SL Nitro. VSS, tele Afib w/ CVR. 2nd Troponin trending down. MD paged to notify of re-occurrence of chest pain.   "

## 2017-06-16 NOTE — CONSULTS
[facility]    Cardiology Consultation   Betty Smith MD    Date of Admission:  6/15/2017    REFERRAL SOURCE: Dr. Oliverio Timmons MD. Hospitalist Service.    REASON FOR CONSULT:   66-year-old gentleman, who underwent stenting of the obtuse marginal artery three days ago on 6/64/2017.    HISTORY OF PRESENT ILLNESS:  Jayro Elder is a 66 year old male who presents with two day history of chest pain.  He is cardiovascular history is significant for coronary artery disease status post multiple stents to the LAD and recent OM3 stenting three days ago, ischemic myopathy with an LVEF of 35 and 40% (well compensated), hypertension, hyperlipidemia, type 2 diabetes mellitus, recent stroke for assumed embolic due to a new diagnosis of atrial fibrillation.  He quit smoking in 2005.    1. Chest pain.  2. Recent coronary stent on 6/16/2017.  3. Recent left occipital stroke with residual right homonymous hemianopia.  4. Recent diagnosis of atrial fibrillation, on Apixiban anti-coagulation.    Patient has a long history of coronary artery disease.  He underwent multiple stents to the LAD (2005, 2009 and at 2013).  A couple of months ago, he presented with a right hemianopia and was diagnosed with a left occipital hemorrhagic stroke.  MRA ruled out carotid artery stenosis or dissection. He was found to have a new diagnosis of atrial fibrillation, and a presumed embolic stroke and started on Apixiban.  On discharge, he developed exertional dyspnea and atypical chest pain.  A nuclear stress test showed a largescar of the anterior septum and apex, and reversible ischemia in the basal lateral wall and inferior wall.  He underwent coronary intervention three days ago on 6/13/2017, by Dr. Kwasi Hudson.  He had a tight stenosis in the large OM3 artery and underwent a 3X 38 mm Synergy drug-eluting stent with post dilatation and good result.  The LAD (proximal, mid and distal) stents looked patent and there was  no significant disease in the right coronary artery.    Patient has been consistently taking aspirin 81 MG, Clopidogrel 75 md and Apixiban (2.5 mg b.i.d. since his stents.  For the last two days, he has been getting pain in his mid back and retrosternal chest, moderate in severity, radiating to the jaw, with partial relief with sublingual nitroglycerin.  He says the pain has been pretty constant for the last two days and gets worse when he takes a deep breath.  It is not associated with dyspnea, nausea or diaphoresis.  He thinks the pain is similar to the symptoms he had before his initial myocardial infarction in 2005.  ECG showed atrial fibrillation, with an old right bundle branch block and no ischemic changes.  His serial troponins have been elevated to 0.961, 0.852, 0.853.  His baseline troponins done a month ago were negative.      Notably, patient has had three back surgeries in the past.    REVIEW OF SYSTEMS:  A 10-point review of systems was completed.  The pertinent positives are documented in history of present illness.  In addition to the homonymous hemianopia in the right eye, chest/back pain, he says today he also has some trouble with the recent memory but overall this is improving.  No change in weight, no other constitutional symptoms.    MEDICATIONS:  PRIOR TO ADMISSION MEDICATIONS:  Aspirin 81 MG, Clopidogrel 75 MG, Apixaban  2.5 mg BID, Imdur 30 MG once daily, simvastatin 40 q. daily, Toprol- daily, amlodipine 5 mg twice daily, pantoprazole 40 mg daily, Synthroid 137  g daily, lisinopril 20 mg daily, sublingual nitroglycerin as needed, metformin thousand milligrams twice daily, insulin, vitamin D3.      Prior to Admission Medications   Prescriptions Last Dose Informant Patient Reported? Taking?   ASPIRIN PO 6/15/2017 at am  Yes Yes   Sig: Take 81 mg by mouth   Apixaban (ELIQUIS PO) 6/15/2017 at am Self Yes Yes   Sig: Take 2.5 mg by mouth 2 times daily    Blood Pressure Monitor KIT  Self No  No   Sig: Use to monitor blood pressure daily or as directed   Cholecalciferol (D3 ADULT PO) 6/14/2017 at pm Self Yes Yes   Sig: Take 2,000 Units by mouth daily   ONE TOUCH LANCETS MISC  Self No No   Sig: use qid   ONE TOUCH ULTRA TEST VI STRP  Self No No   Sig: use qid   amLODIPine (NORVASC) 5 MG tablet 6/15/2017 at am Self No Yes   Sig: Take 1 tablet (5 mg) by mouth 2 times daily   clopidogrel (PLAVIX) 75 MG tablet 6/15/2017 at am  No Yes   Sig: Take 1 tablet (75 mg) by mouth daily   insulin glargine (LANTUS) 100 UNIT/ML injection 6/14/2017 at 1700  Yes Yes   Sig: Inject 40 Units Subcutaneous every 24 hours Daily at 1700    isosorbide mononitrate (IMDUR) 30 MG 24 hr tablet 6/15/2017 at am Self No Yes   Sig: Take 1 tablet (30 mg) by mouth daily   levothyroxine (SYNTHROID, LEVOTHROID) 137 MCG tablet 6/14/2017 at hs Self Yes Yes   Sig: Take 137 mcg by mouth At Bedtime    lisinopril (PRINIVIL/ZESTRIL) 20 MG tablet  Self Yes No   Sig: Take 20 mg by mouth 2 times daily   metFORMIN (GLUCOPHAGE) 1000 MG tablet 6/12/2017 Self No No   Sig: Take 1 tablet (1,000 mg) by mouth 2 times daily (with meals)   metoprolol (TOPROL-XL) 100 MG 24 hr tablet 6/14/2017 at hs Self No Yes   Sig: Take 1 tablet (100 mg) by mouth At Bedtime   nitroglycerin (NITROSTAT) 0.4 MG sublingual tablet prn Self No No   Sig: Place 1 tablet (0.4 mg) under the tongue every 5 minutes as needed for chest pain   order for DME  Self No No   Sig: Equipment being ordered: Cane ()  Treatment Diagnosis: Impaired balance   pantoprazole (PROTONIX) 40 MG enteric coated tablet 6/15/2017 at am Self No Yes   Sig: Take 1 tablet (40 mg) by mouth every morning (before breakfast)   simvastatin (ZOCOR) 40 MG tablet 6/14/2017 at hs Self No Yes   Sig: Take 1 tablet (40 mg) by mouth At Bedtime      Facility-Administered Medications: None       ALLERGIES:  No known allergies.    PAST MEDICAL HISTORY:  1.  Chronic coronary artery disease.  Status post multiple stents to the  LAD (2005, 2009, 2013-with mild in-stent restenosis but overall patent stents on angiography in June 2017).  Status post drug-eluting stent to OM3 on 6/13/2017.  2.  Ischemic cardiomyopathy. LVEF 35 - 40%.  3.  Multiple stents to the LAD with in-stent restenosis.   4.  Hypertension.   5.  Recent cerebrovascular accident associated with atrial fibrillation. April 2017.  6.  Recent atrial fibrillation with medical control. April 2017.  7.  Type 2 diabetes mellitus.  Hemoglobin A1c 11.7.  On metformin and insulin.  8.  History of remote tobacco but not currently.     PAST SURGICAL HISTORY:  Status post coronary stenting as above.  Spinal laminectomy X3 (1980 362, 1990), status post bunionectomy, nontender with surgery at age 9.    SOCIAL HISTORY:  .  Lives with his wife.  Retired 12 years ago.  He is a former smoker and quit in 2005, at least a 25 pack years.  Occasional alcohol intake.  No recreational drugs.  No regular physical exercise.    FAMILY HISTORY:  Family history was reviewed.  One of his three brothers has coronary artery disease.  Mother has diabetes.  Father had unspecified cancer of the throat and chest.  One sister has colorectal cancer.    PHYSICAL EXAMINATION:  Physical Exam   Temp: (P) 97.4  F (36.3  C) Temp src: (P) Oral BP: (P) 123/80 Pulse: 71 Heart Rate: (P) 54 Resp: (P) 18 SpO2: (P) 96 % O2 Device: (P) None (Room air)    222 lbs 3.58 oz    Constitutional: Comfortable at rest.  Obese body habitus.  Cooperative, alert and oriented, well developed, well nourished.  Eyes: Conjunctivae and lids unremarkable, sclera white, no xanthalasma,   ENT: Unremarkable upper and lower dentures.  No cyanosis or pallor.  Neck: Carotid pulses are full and equal bilaterally, no carotid bruit, no thyromegaly     Respiratory: Normal respiratory effort with symmetrical chest wall movements and no use of accessory muscles. Good air entry with normal breath sounds and no rales or wheeze.  Cardiovascular: Normal  jugular venous pulse and pressure.  Normal carotid pulse character and volume.  No carotid bruit.  Apical impulse is undisplaced and normal to palpation without parasternal heave or thrill.  Heart sounds are normal and regular.  No S3 or S4.  No audible murmur.  No reproducible chest wall pain.  No pericardial friction rub.  GI: Soft, nontender, no hepatosplenomegaly, no masses, active bowel sounds.  No surgical scars.  Lymph/Hematologic: Not examined.  Skin: No rash, erythema, ecchymosis.  Musculoskeletal: Normal muscle tone and strength.  He does have mildly reproducible mid back discomfort.  He states that he feels discomfort between his shoulder blades.  Neuropsychiatric: Oriented to time place and person.  Affect normal.  No gross motor deficits.  Right homonymous hemianopia.    Extremities: No edema. No clubbing or deformities.  Vascular: His recent left radial arterial access site looks satisfactory.  Minimal tenderness.  No hematoma.  No significant bruising.  Palpable pulses.  No bruit.    DIAGNOSTIC DATA:  I reviewed the laboratory data and imaging studies including a ECG, chest x-ray, echocardiogram and coronary angiogram.  Results were discussed with the patient.    LABORATORY DATA  - serial troponins elevated to 0.961, 0.852, 0.853.  His baseline troponins done a month ago were negative.  Creatinine 0.85 with an estimated GFR greater than 90.  Normal electrolytes.  Glucose 116.  Hemoglobin 12.7.  Platelet 220.    ECG - atrial fibrillation, 65 BPM, partial right bundle-branch block (old).  No ischemic changes.    CHEST X-RAY - normal cardiac silhouette.  Normal lungs.  No pleural effusion.    TRANSTHORACIC ECHOCARDIOGRAPHY - 4/20/2017. Distal anterior, and mid to distal anteroseptal and septal severe hypokinesis. The visual ejection fraction is estimated at 35-40%. The right ventricle is normal in size and function.  Trace mitral and tricuspid regurgitation.  Normal trileaflet aortic valve.    CORONARY  ANGIOGRAM - 6/13/2017.  I personally reviewed the angiogram images and also discussed it with performing cardiologist, Dr. Hudson.  The LAD stents are patent.  There is slightly slow flow down the LAD, but per Dr. Hudson he had selective engagement of the catheter in the circumflex ostium.  Tight OM3 stenosis that was stented as described above.  No significant disease of the right coronary artery.    DIAGNOSES:  1.  Atypical chest pain with troponin anemia, likely secondary to recent coronary intervention.  2.  Coronary artery disease status post multiple stents to the LAD, and recent stenting of the OM three on 6/13/2017, in the context of positive stress test and exertional dyspnea.  3. Recent left occipital stroke with residual right homonymous hemianipoia.  4. Recent diagnosis of atrial fibrillation, on Apixiban anti-coagulation.  5.  Compensated ischemic left ventricular systolic cardiomyopathy, LVEF 35-40%, NYHA1 status, euvolemic.  6.  Type 2 diabetes mellitus, poorly controlled on metformin and insulin (HbA1c 11.7%).  7.  Hypertension, treated.  8.  Hyperlipidemia.  9.  Former cigarette smoker (smoking cessation 2005).    ASSESSMENT:  Jayro Elder is a 66 year old male who was admitted on 6/15/2017 with a two day history of atypical chest pain.  His troponin is elevated, but this is most likely due to the coronary intervention he had three days ago.  He has been consistently taking his antiplatelet regimen.  ECG is unremarkable for ischemia.  Nevertheless, due to his recent coronary stent, 4it will be wise to assess stent patency.    PLAN:  1.  Coronary angiogram today.  His recent procedure was via left radial arterial access.  Consent form signed. Creatinine normal.  Hemoglobin normal.  No contrast allergy.  2.  If the stents are patent, he could be discharged home.  Follow up with his established cardiologist, Dr. Johnathan Mckeon.  3. No change to established medications.  4. Cardiology will  follow.    It was a pleasure to visit with the patient.  I spent 50 minutes with him, greater than 50% of the time was spent in counseling and coordination of care. Questions answered to their satisfaction.    Betty Smith MD

## 2017-06-16 NOTE — PROGRESS NOTES
[facility]    Hospitalist Progress note    Wesley Cornell MD     ASSESSMENT AN PLAN: Mr. Jayro Elder is a 66-year-old male with past medical history significant for coronary artery disease with multiple stents in the past, diabetes, hypertension, dyslipidemia, atrial fibrillation, hypothyroidism, gastroesophageal reflux disease, cerebrovascular accident who presented with complaints of chest pain:  Chest pain:  Significant CAD with multiple stents and recent  drug-eluting stent to OM3 and multiple stents, stent occlusion, and recent stent 2 days ago. Cardiologist on-call, Dr. Goyal, who suggested that he might need a repeat catheterization.   ---Monitor serial troponins and tele-monitor  ----Cardiology consulted.   --- Coronary angiogram today  ---sublingual nitroglycerin p.r.n.   ---continue with his aspirin, Plavix and apixaban.   ---continue with his lisinopril, Toprol-XL, Imdur and statin.     Uncontrolled diabetes mellitus: HbA1c from April was 11.7.   --- Hold off on the metformin  --- Sliding scale insulin at high resistance.     Hypertension. Resume prior to admission lisinopril, Toprol-XL    Dyslipidemia:  Resume statin.     Hypothyroidism.  Resume Synthroid.     Gastroesophageal reflux disease.  Resume Protonix.     History of subacute left occipital stroke 04/2007.  He has no focal neurological deficits at this time.  Will continue with his aspirin, Plavix, and apixaban.     Atrial fibrillation with controlled ventricular rate.  Toprol-XL, resume apixaban when verified, holding tomorrow morning for cardiac catheterization.    Deep venous thrombosis prophylaxis:  He will be on full dose apixaban for his atrial fibrillation.       CODE STATUS:  Full code.       Disposition: Per cardio.      Wesley Cornell MD  [place] [facility]ist  Board certified,   Internal Medicine  Pager # [phone]  6/16/2017      SUBJECTIVE: Patient seen and examine. Report no compliant.  "    OBJECTIVE:    BP (!) 133/91 (BP Location: Right arm)  Pulse 71  Temp 97.3  F (36.3  C) (Oral)  Resp 18  Ht 1.93 m (6' 4\")  Wt 100.8 kg (222 lb 3.6 oz)  SpO2 100%  BMI 27.05 kg/m2     GENERAL:  NAD.   HEENT:  Head is normocephalic and atraumatic. PERRL. EOMI. No scleral icterus. No conjunctival erythema. No nasal discharge.  Moist mucous membranes. Pharynx unremarkable.  NECK:  Supple. Non-tender. No LAD or masses.  No carotid bruits.  LUNGS:  Clear to auscultation bilaterally. No wheezing, rhonchi or rales.   HEART:  RRR. S1 S2. No murmurs.  ABD:  Soft, ND, NT, + BS. No masses or HSM.   SKIN:  No ulcers, rashes or lesions.   EXT:  No calf tenderness. Pulses equal throughout.   MSK:  No deformities.  PSYCH: Appropriate mood and affect. Judgement and thought intact.       Labs:    Most Recent 3 CBC's:  Recent Labs   Lab Test  06/15/17   1533  06/09/17   1405  04/26/17   0745   WBC  5.8  6.7  4.6   HGB  12.7*  12.4*  13.1*   MCV  84  85  85   PLT  220  223  222      Most Recent 3 BMP's:  Recent Labs   Lab Test  06/15/17   1533  06/15/17   1354  06/09/17   1405   NA  138  136  135   POTASSIUM  4.2  4.7  4.2   CHLORIDE  105  105  101   CO2  24  23  25   BUN  15  14  16   CR  0.85  0.77  0.80   ANIONGAP  9  8  9   BETTIE  8.9  8.6  9.1   GLC  319*  283*  385*     Most Recent 3 Troponin's:  Recent Labs   Lab Test  06/16/17   0240  06/15/17   2200  06/15/17   1533   TROPI  0.853*  0.852*  0.961*     Most Recent 3 INR's:  Recent Labs   Lab Test  06/09/17   1405  11/03/10   0940   INR  1.18*  1.03     Most Recent 2 LFT's:  Recent Labs   Lab Test 03/04/14 11/30/11   0852  11/02/10   0900   AST   --    --   23   ALT  50*  30  33   ALKPHOS   --    --   72   BILITOTAL   --    --   0.3     Most Recent Cholesterol Panel:  Recent Labs   Lab Test  04/21/17   0440   CHOL  118   LDL  55   HDL  51   TRIG  59     Most Recent 6 Bacteria Isolates From Any Culture (See EPIC Reports for Culture Details):No lab results found.  Most " Recent TSH, T4 and HgbA1c: Recent Labs   Lab Test  04/22/17   0435  04/21/17   0440   TSH   --   3.28   A1C  11.7*   --        Prescriptions Prior to Admission   Medication Sig Dispense Refill Last Dose     ASPIRIN PO Take 81 mg by mouth   6/15/2017 at am     insulin glargine (LANTUS) 100 UNIT/ML injection Inject 40 Units Subcutaneous every 24 hours Daily at 1700    6/14/2017 at 1700     Cholecalciferol (D3 ADULT PO) Take 2,000 Units by mouth daily   6/14/2017 at pm     clopidogrel (PLAVIX) 75 MG tablet Take 1 tablet (75 mg) by mouth daily 90 tablet 3 6/15/2017 at am     Apixaban (ELIQUIS PO) Take 2.5 mg by mouth 2 times daily    6/15/2017 at am     isosorbide mononitrate (IMDUR) 30 MG 24 hr tablet Take 1 tablet (30 mg) by mouth daily 90 tablet 3 6/15/2017 at am     simvastatin (ZOCOR) 40 MG tablet Take 1 tablet (40 mg) by mouth At Bedtime 90 tablet 3 6/14/2017 at hs     metoprolol (TOPROL-XL) 100 MG 24 hr tablet Take 1 tablet (100 mg) by mouth At Bedtime 90 tablet 3 6/14/2017 at hs     amLODIPine (NORVASC) 5 MG tablet Take 1 tablet (5 mg) by mouth 2 times daily 180 tablet 3 6/15/2017 at am     pantoprazole (PROTONIX) 40 MG enteric coated tablet Take 1 tablet (40 mg) by mouth every morning (before breakfast) 30 tablet 0 6/15/2017 at am     levothyroxine (SYNTHROID, LEVOTHROID) 137 MCG tablet Take 137 mcg by mouth At Bedtime  90 tablet 3 6/14/2017 at hs     lisinopril (PRINIVIL/ZESTRIL) 20 MG tablet Take 20 mg by mouth 2 times daily   6/13/2017 at Unknown time     Blood Pressure Monitor KIT Use to monitor blood pressure daily or as directed 1 kit 0 6/13/2017 at Unknown time     nitroglycerin (NITROSTAT) 0.4 MG sublingual tablet Place 1 tablet (0.4 mg) under the tongue every 5 minutes as needed for chest pain 50 tablet 0 prn     order for DME Equipment being ordered: Cane ()  Treatment Diagnosis: Impaired balance 1 each 0 6/13/2017 at Unknown time     metFORMIN (GLUCOPHAGE) 1000 MG tablet Take 1 tablet (1,000 mg)  by mouth 2 times daily (with meals) 60 tablet 0 6/12/2017     ONE TOUCH ULTRA TEST VI STRP use qid 120 11 6/13/2017 at Unknown time     ONE TOUCH LANCETS MISC use qid 120 4 6/13/2017 at Unknown time       Scheduled medications:    insulin aspart  1-10 Units Subcutaneous TID AC     insulin aspart  1-7 Units Subcutaneous At Bedtime     amLODIPine  5 mg Oral BID     apixaban ANTICOAGULANT (ELIQUIS) tablet 2.5 mg  2.5 mg Oral BID     aspirin chewable tablet 81 mg  81 mg Oral Daily     clopidogrel  75 mg Oral Daily     insulin glargine  40 Units Subcutaneous At Bedtime     isosorbide mononitrate  30 mg Oral Daily     levothyroxine  137 mcg Oral At Bedtime     lisinopril  20 mg Oral BID     metoprolol  100 mg Oral At Bedtime     pantoprazole  40 mg Oral QAM AC     simvastatin  40 mg Oral At Bedtime     pneumococcal  0.5 mL Intramuscular Prior to discharge       I/O last 3 completed shifts:  In: 600 [P.O.:600]  Out: -   Data   Data reviewed today:  I personally reviewed no images or EKG's today.    Recent Labs  Lab 06/16/17  0240 06/15/17  2200 06/15/17  1533 06/15/17  1354 06/09/17  1405   WBC  --   --  5.8  --  6.7   HGB  --   --  12.7*  --  12.4*   MCV  --   --  84  --  85   PLT  --   --  220  --  223   INR  --   --   --   --  1.18*   NA  --   --  138 136 135   POTASSIUM  --   --  4.2 4.7 4.2   CHLORIDE  --   --  105 105 101   CO2  --   --  24 23 25   BUN  --   --  15 14 16   CR  --   --  0.85 0.77 0.80   ANIONGAP  --   --  9 8 9   BETTIE  --   --  8.9 8.6 9.1   GLC  --   --  319* 283* 385*   TROPI 0.853* 0.852* 0.961*  --   --        Recent Results (from the past 24 hour(s))   XR Chest 2 Views    Narrative    XR CHEST 2 VW 6/15/2017 4:08 PM    HISTORY: Pain.    COMPARISON: November 13, 2013.      Impression    IMPRESSION: The lungs are clear. No focal pulmonary opacities. Heart  and mediastinum are unremarkable. No acute cardiopulmonary  abnormalities. Old left rib fractures.    MAHI COLE MD

## 2017-06-17 VITALS
BODY MASS INDEX: 27.22 KG/M2 | OXYGEN SATURATION: 97 % | HEART RATE: 71 BPM | SYSTOLIC BLOOD PRESSURE: 139 MMHG | DIASTOLIC BLOOD PRESSURE: 87 MMHG | TEMPERATURE: 98 F | RESPIRATION RATE: 16 BRPM | HEIGHT: 76 IN | WEIGHT: 223.55 LBS

## 2017-06-17 LAB
GLUCOSE BLDC GLUCOMTR-MCNC: 105 MG/DL (ref 70–99)
GLUCOSE BLDC GLUCOMTR-MCNC: 131 MG/DL (ref 70–99)
GLUCOSE BLDC GLUCOMTR-MCNC: 79 MG/DL (ref 70–99)
INTERPRETATION ECG - MUSE: NORMAL
INTERPRETATION ECG - MUSE: NORMAL

## 2017-06-17 PROCEDURE — 25000128 H RX IP 250 OP 636: Performed by: HOSPITALIST

## 2017-06-17 PROCEDURE — 00000146 ZZHCL STATISTIC GLUCOSE BY METER IP

## 2017-06-17 PROCEDURE — 90670 PCV13 VACCINE IM: CPT | Performed by: HOSPITALIST

## 2017-06-17 PROCEDURE — 25000132 ZZH RX MED GY IP 250 OP 250 PS 637: Performed by: INTERNAL MEDICINE

## 2017-06-17 PROCEDURE — 99239 HOSP IP/OBS DSCHRG MGMT >30: CPT | Performed by: INTERNAL MEDICINE

## 2017-06-17 PROCEDURE — 25000132 ZZH RX MED GY IP 250 OP 250 PS 637: Performed by: HOSPITALIST

## 2017-06-17 RX ADMIN — CLOPIDOGREL BISULFATE 75 MG: 75 TABLET, FILM COATED ORAL at 09:28

## 2017-06-17 RX ADMIN — AMLODIPINE BESYLATE 5 MG: 5 TABLET ORAL at 09:27

## 2017-06-17 RX ADMIN — ASPIRIN 81 MG: 81 TABLET, COATED ORAL at 09:28

## 2017-06-17 RX ADMIN — PANTOPRAZOLE SODIUM 40 MG: 40 TABLET, DELAYED RELEASE ORAL at 06:43

## 2017-06-17 RX ADMIN — LISINOPRIL 20 MG: 20 TABLET ORAL at 09:28

## 2017-06-17 RX ADMIN — ISOSORBIDE MONONITRATE 30 MG: 30 TABLET, EXTENDED RELEASE ORAL at 09:28

## 2017-06-17 RX ADMIN — PNEUMOCOCCAL 13-VALENT CONJUGATE VACCINE 0.5 ML: 2.2; 2.2; 2.2; 2.2; 2.2; 4.4; 2.2; 2.2; 2.2; 2.2; 2.2; 2.2; 2.2 INJECTION, SUSPENSION INTRAMUSCULAR at 12:06

## 2017-06-17 RX ADMIN — APIXABAN 2.5 MG: 2.5 TABLET, FILM COATED ORAL at 09:28

## 2017-06-17 ASSESSMENT — PAIN DESCRIPTION - DESCRIPTORS: DESCRIPTORS: ACHING

## 2017-06-17 NOTE — DISCHARGE SUMMARY
[facility]    Discharge Summary  Hospitalist    Date of Admission:  6/15/2017  Date of Discharge:  6/17/2017  Discharging Provider: Venkata Callejas MD  Date of Service: 06/17/17    Discharge Diagnoses   Chest pain, likely non-cardiac  Recent OM stent on 6/14/17  Uncontrolled diabetes type 2    History of Present Illness   Mr. Jayro Elder is a 66-year-old male with past medical history significant for coronary artery disease with multiple stents in the past, diabetes, hypertension, dyslipidemia, atrial fibrillation, hypothyroidism, gastroesophageal reflux disease, cerebrovascular accident who presented with complaints of chest pain:    Please refer to HPI for further details.    Hospital Course   The following problems were addressed during his hospitalization.     Chest pain:  Significant CAD with multiple stents and recent  drug-eluting stent to OM3 and multiple stents, stent occlusion, and recent OM stent on 6/14/17. Troponins were mildly elevated but stable ~0.85-0.96.  Cardiology was consulted and coronary angiogram was performed on 6/16/17 showing a patent OM stent. LAD stents with 30-50% instent restenosis. FFR 0.89. RCA has mid 30-40% stenosis. Pain felt likely non cardiac in nature. He did develop a small left radial hematoma with good distal perfusion.  Cardiology recommends:  ---Continue uninterrupted aspirin 81 MG and clopidogrel 75 MG.  Restart Eliquis at 2.5 MG b.i.d (for atrial fibrillation and recent stroke).  This lower dose of Eliquis is recommended given his left radial hematoma for the interim.   ---Follow-up with his establish cardiologist, Dr. Mckeon next week on June 22 at 7:45 AM.  At that time, optimal regimen of antiplatelet/anticoagulant to be addressed. One option would be long-term Coumadin and Clopidogrel, discontinue Aspirin after one month.  Otherwise dose of Eliquis should be increased to 5mg po BID.  ---continue with his lisinopril, Toprol-XL, Imdur and statin.  "    Uncontrolled diabetes mellitus: HbA1c from April was 11.7.  Due to noncompliance with insulin.  Patient reports he will start using his Lantus.  --- Resume metformin and Lantus for discharge    Hypertension. Resume prior to admission lisinopril, Toprol-XL    Dyslipidemia:  Resume statin.     Hypothyroidism.  Resume Synthroid.     Gastroesophageal reflux disease.  Resume Protonix.     History of subacute left occipital stroke 04/2007.  He has no focal neurological deficits at this time.  Will continue with his aspirin, Plavix, and apixaban.     Atrial fibrillation with controlled ventricular rate.  Toprol-XL    Venkata Callejas MD  [facility]    Unresulted Labs Ordered in the Past 30 Days of this Admission     No orders found from 4/16/2017 to 6/16/2017.          Code Status   Full Code       Primary Care Physician   Davoin Cordova    Physical Exam   /87 (BP Location: Right arm)  Pulse 71  Temp 98.5  F (36.9  C) (Oral)  Resp 16  Ht 1.93 m (6' 4\")  Wt 101.4 kg (223 lb 8.7 oz)  SpO2 97%  BMI 27.21 kg/m2  Constitutional: NAD, awake  HEENT: EOMI   Cardiovascular: S1, S2, regular rhythm  Respiratory: CTAB, no crackles  Gastrointestinal: Soft, NT  Neurologic: No focal deficits  Skin: Small hematoma over L radial artery, decreasing in size    Discharge Disposition   Discharged to home  Condition at discharge: Stable    Consultations This Hospital Stay   CARDIOLOGY IP CONSULT  SMOKING CESSATION PROGRAM IP CONSULT    Time Spent on this Encounter   IVenkata, personally saw the patient today and spent greater than 30 minutes discharging this patient.    Discharge Orders     Reason for your hospital stay   Chest pain, with patent OM stent     Follow Up and recommended labs and tests   Follow up with primary care provider, Davion Cordova, within 7 days for hospital follow- up.     Activity   Your activity upon discharge: activity as tolerated     Full Code     Diet   Follow this diet upon " discharge: Orders Placed This Encounter     Low Saturated Fat Na <2400 mg         Discharge Medications   Current Discharge Medication List      CONTINUE these medications which have NOT CHANGED    Details   ASPIRIN PO Take 81 mg by mouth      insulin glargine (LANTUS) 100 UNIT/ML injection Inject 40 Units Subcutaneous every 24 hours Daily at 1700       Cholecalciferol (D3 ADULT PO) Take 2,000 Units by mouth daily      clopidogrel (PLAVIX) 75 MG tablet Take 1 tablet (75 mg) by mouth daily  Qty: 90 tablet, Refills: 3    Associated Diagnoses: Coronary artery disease involving native coronary artery of native heart with other form of angina pectoris (H)      Apixaban (ELIQUIS PO) Take 2.5 mg by mouth 2 times daily       isosorbide mononitrate (IMDUR) 30 MG 24 hr tablet Take 1 tablet (30 mg) by mouth daily  Qty: 90 tablet, Refills: 3    Associated Diagnoses: Hypertension goal BP (blood pressure) < 140/90      simvastatin (ZOCOR) 40 MG tablet Take 1 tablet (40 mg) by mouth At Bedtime  Qty: 90 tablet, Refills: 3    Associated Diagnoses: Coronary artery disease involving native coronary artery of native heart without angina pectoris      metoprolol (TOPROL-XL) 100 MG 24 hr tablet Take 1 tablet (100 mg) by mouth At Bedtime  Qty: 90 tablet, Refills: 3    Associated Diagnoses: Coronary artery disease involving native coronary artery of native heart without angina pectoris      amLODIPine (NORVASC) 5 MG tablet Take 1 tablet (5 mg) by mouth 2 times daily  Qty: 180 tablet, Refills: 3    Associated Diagnoses: Coronary artery disease involving native coronary artery of native heart with angina pectoris (H)      pantoprazole (PROTONIX) 40 MG enteric coated tablet Take 1 tablet (40 mg) by mouth every morning (before breakfast)  Qty: 30 tablet, Refills: 0    Associated Diagnoses: GERD (gastroesophageal reflux disease)      levothyroxine (SYNTHROID, LEVOTHROID) 137 MCG tablet Take 137 mcg by mouth At Bedtime   Qty: 90 tablet, Refills:  3      lisinopril (PRINIVIL/ZESTRIL) 20 MG tablet Take 20 mg by mouth 2 times daily      Blood Pressure Monitor KIT Use to monitor blood pressure daily or as directed  Qty: 1 kit, Refills: 0    Associated Diagnoses: Hypertension goal BP (blood pressure) < 140/90      nitroglycerin (NITROSTAT) 0.4 MG sublingual tablet Place 1 tablet (0.4 mg) under the tongue every 5 minutes as needed for chest pain  Qty: 50 tablet, Refills: 0    Associated Diagnoses: Other chest pain      order for DME Equipment being ordered: Cane ()  Treatment Diagnosis: Impaired balance  Qty: 1 each, Refills: 0    Associated Diagnoses: Hemorrhagic stroke (H)      metFORMIN (GLUCOPHAGE) 1000 MG tablet Take 1 tablet (1,000 mg) by mouth 2 times daily (with meals)  Qty: 60 tablet, Refills: 0    Comments: Restart this medication on 11/16 AM  Associated Diagnoses: Type II or unspecified type diabetes mellitus without mention of complication, not stated as uncontrolled      ONE TOUCH ULTRA TEST VI STRP use qid  Qty: 120, Refills: 11    Associated Diagnoses: Type II or unspecified type diabetes mellitus without mention of complication, not stated as uncontrolled      ONE TOUCH LANCETS MISC use qid  Qty: 120, Refills: 4           Allergies   Allergies   Allergen Reactions     No Known Allergies      Data   Most Recent 3 CBC's:  Recent Labs   Lab Test  06/15/17   1533  06/09/17   1405  04/26/17   0745   WBC  5.8  6.7  4.6   HGB  12.7*  12.4*  13.1*   MCV  84  85  85   PLT  220  223  222      Most Recent 3 BMP's:  Recent Labs   Lab Test  06/15/17   1533  06/15/17   1354  06/09/17   1405   NA  138  136  135   POTASSIUM  4.2  4.7  4.2   CHLORIDE  105  105  101   CO2  24  23  25   BUN  15  14  16   CR  0.85  0.77  0.80   ANIONGAP  9  8  9   BETTIE  8.9  8.6  9.1   GLC  319*  283*  385*     Most Recent 2 LFT's:  Recent Labs   Lab Test 03/04/14 11/30/11   0852  11/02/10   0900   AST   --    --   23   ALT  50*  30  33   ALKPHOS   --    --   72   BILITOTAL   --     --   0.3     Most Recent INR's and Anticoagulation Dosing History:  Anticoagulation Dose History     Recent Dosing and Labs Latest Ref Rng & Units 6/29/2005 7/25/2005 2/4/2009 2/11/2009 11/3/2010 6/9/2017    INR 0.86 - 1.14 1.02 1.06 0.96 0.91 1.03 1.18(H)        Most Recent 3 Troponin's:  Recent Labs   Lab Test  06/16/17   0240  06/15/17   2200  06/15/17   1533   TROPI  0.853*  0.852*  0.961*     Most Recent Cholesterol Panel:  Recent Labs   Lab Test  04/21/17   0440   CHOL  118   LDL  55   HDL  51   TRIG  59     Most Recent 6 Bacteria Isolates From Any Culture (See EPIC Reports for Culture Details):No lab results found.  Most Recent TSH, T4 and A1c Labs:  Recent Labs   Lab Test  04/22/17   0435  04/21/17   0440   TSH   --   3.28   A1C  11.7*   --      Results for orders placed or performed during the hospital encounter of 06/15/17   XR Chest 2 Views    Narrative    XR CHEST 2 VW 6/15/2017 4:08 PM    HISTORY: Pain.    COMPARISON: November 13, 2013.      Impression    IMPRESSION: The lungs are clear. No focal pulmonary opacities. Heart  and mediastinum are unremarkable. No acute cardiopulmonary  abnormalities. Old left rib fractures.    MAHI COLE MD

## 2017-06-17 NOTE — PROGRESS NOTES
[facility]    Discharge Summary  Hospitalist    Date of Admission:  6/15/2017  Date of Discharge:  6/17/2017  Discharging Provider: Venkata Callejas MD  Date of Service: 06/17/17    Discharge Diagnoses   Chest pain, likely non-cardiac  Recent OM stent on 6/14/17  Uncontrolled diabetes type 2    History of Present Illness   Mr. Jayro Elder is a 66-year-old male with past medical history significant for coronary artery disease with multiple stents in the past, diabetes, hypertension, dyslipidemia, atrial fibrillation, hypothyroidism, gastroesophageal reflux disease, cerebrovascular accident who presented with complaints of chest pain:    Please refer to HPI for further details.    Hospital Course   The following problems were addressed during his hospitalization.     Chest pain:  Significant CAD with multiple stents and recent  drug-eluting stent to OM3 and multiple stents, stent occlusion, and recent OM stent on 6/14/17. Troponins were mildly elevated but stable ~0.85-0.96.  Cardiology was consulted and coronary angiogram was performed on 6/16/17 showing a patent OM stent. LAD stents with 30-50% instent restenosis. FFR 0.89. RCA has mid 30-40% stenosis. Pain felt likely non cardiac in nature. He did develop a small left radial hematoma with good distal perfusion.  Cardiology recommends:  ---Continue uninterrupted aspirin 81 MG and clopidogrel 75 MG.  Restart Eliquis at 2.5 MG b.i.d (for atrial fibrillation and recent stroke).  This lower dose of Eliquis is recommended given his left radial hematoma for the interim.   ---Follow-up with his establish cardiologist, Dr. Mckeon next week on June 22 at 7:45 AM.  At that time, optimal regimen of antiplatelet/anticoagulant to be addressed. One option would be long-term Coumadin and Clopidogrel, discontinue Aspirin after one month.  Otherwise dose of Eliquis should be increased to 5mg po BID.  ---continue with his lisinopril, Toprol-XL, Imdur and statin.  "    Uncontrolled diabetes mellitus: HbA1c from April was 11.7.  Due to noncompliance with insulin.  Patient reports he will start using his Lantus.  --- Resume metformin and Lantus for discharge    Hypertension. Resume prior to admission lisinopril, Toprol-XL    Dyslipidemia:  Resume statin.     Hypothyroidism.  Resume Synthroid.     Gastroesophageal reflux disease.  Resume Protonix.     History of subacute left occipital stroke 04/2007.  He has no focal neurological deficits at this time.  Will continue with his aspirin, Plavix, and apixaban.     Atrial fibrillation with controlled ventricular rate.  Toprol-XL    Venkata Callejas MD  [facility]ist    Unresulted Labs Ordered in the Past 30 Days of this Admission     No orders found from 4/16/2017 to 6/16/2017.          Code Status   Full Code       Primary Care Physician   Davion Cordova    Physical Exam   /87 (BP Location: Right arm)  Pulse 71  Temp 98.5  F (36.9  C) (Oral)  Resp 16  Ht 1.93 m (6' 4\")  Wt 101.4 kg (223 lb 8.7 oz)  SpO2 97%  BMI 27.21 kg/m2  Constitutional: NAD, awake  HEENT: EOMI   Cardiovascular: S1, S2, regular rhythm  Respiratory: CTAB, no crackles  Gastrointestinal: Soft, NT  Neurologic: No focal deficits  Skin: Small hematoma over L radial artery, decreasing in size    Discharge Disposition   Discharged to home  Condition at discharge: Stable    Consultations This Hospital Stay   CARDIOLOGY IP CONSULT  SMOKING CESSATION PROGRAM IP CONSULT    Time Spent on this Encounter   IVenkata, personally saw the patient today and spent greater than 30 minutes discharging this patient.    Discharge Orders     Reason for your hospital stay   Chest pain, with patent OM stent     Follow Up and recommended labs and tests   Follow up with primary care provider, Davion Cordova, within 7 days for hospital follow- up.     Activity   Your activity upon discharge: activity as tolerated     Full Code     Diet   Follow this diet upon " discharge: Orders Placed This Encounter     Low Saturated Fat Na <2400 mg         Discharge Medications   Current Discharge Medication List      CONTINUE these medications which have NOT CHANGED    Details   ASPIRIN PO Take 81 mg by mouth      insulin glargine (LANTUS) 100 UNIT/ML injection Inject 40 Units Subcutaneous every 24 hours Daily at 1700       Cholecalciferol (D3 ADULT PO) Take 2,000 Units by mouth daily      clopidogrel (PLAVIX) 75 MG tablet Take 1 tablet (75 mg) by mouth daily  Qty: 90 tablet, Refills: 3    Associated Diagnoses: Coronary artery disease involving native coronary artery of native heart with other form of angina pectoris (H)      Apixaban (ELIQUIS PO) Take 2.5 mg by mouth 2 times daily       isosorbide mononitrate (IMDUR) 30 MG 24 hr tablet Take 1 tablet (30 mg) by mouth daily  Qty: 90 tablet, Refills: 3    Associated Diagnoses: Hypertension goal BP (blood pressure) < 140/90      simvastatin (ZOCOR) 40 MG tablet Take 1 tablet (40 mg) by mouth At Bedtime  Qty: 90 tablet, Refills: 3    Associated Diagnoses: Coronary artery disease involving native coronary artery of native heart without angina pectoris      metoprolol (TOPROL-XL) 100 MG 24 hr tablet Take 1 tablet (100 mg) by mouth At Bedtime  Qty: 90 tablet, Refills: 3    Associated Diagnoses: Coronary artery disease involving native coronary artery of native heart without angina pectoris      amLODIPine (NORVASC) 5 MG tablet Take 1 tablet (5 mg) by mouth 2 times daily  Qty: 180 tablet, Refills: 3    Associated Diagnoses: Coronary artery disease involving native coronary artery of native heart with angina pectoris (H)      pantoprazole (PROTONIX) 40 MG enteric coated tablet Take 1 tablet (40 mg) by mouth every morning (before breakfast)  Qty: 30 tablet, Refills: 0    Associated Diagnoses: GERD (gastroesophageal reflux disease)      levothyroxine (SYNTHROID, LEVOTHROID) 137 MCG tablet Take 137 mcg by mouth At Bedtime   Qty: 90 tablet, Refills:  3      lisinopril (PRINIVIL/ZESTRIL) 20 MG tablet Take 20 mg by mouth 2 times daily      Blood Pressure Monitor KIT Use to monitor blood pressure daily or as directed  Qty: 1 kit, Refills: 0    Associated Diagnoses: Hypertension goal BP (blood pressure) < 140/90      nitroglycerin (NITROSTAT) 0.4 MG sublingual tablet Place 1 tablet (0.4 mg) under the tongue every 5 minutes as needed for chest pain  Qty: 50 tablet, Refills: 0    Associated Diagnoses: Other chest pain      order for DME Equipment being ordered: Cane ()  Treatment Diagnosis: Impaired balance  Qty: 1 each, Refills: 0    Associated Diagnoses: Hemorrhagic stroke (H)      metFORMIN (GLUCOPHAGE) 1000 MG tablet Take 1 tablet (1,000 mg) by mouth 2 times daily (with meals)  Qty: 60 tablet, Refills: 0    Comments: Restart this medication on 11/16 AM  Associated Diagnoses: Type II or unspecified type diabetes mellitus without mention of complication, not stated as uncontrolled      ONE TOUCH ULTRA TEST VI STRP use qid  Qty: 120, Refills: 11    Associated Diagnoses: Type II or unspecified type diabetes mellitus without mention of complication, not stated as uncontrolled      ONE TOUCH LANCETS MISC use qid  Qty: 120, Refills: 4           Allergies   Allergies   Allergen Reactions     No Known Allergies      Data   Most Recent 3 CBC's:  Recent Labs   Lab Test  06/15/17   1533  06/09/17   1405  04/26/17   0745   WBC  5.8  6.7  4.6   HGB  12.7*  12.4*  13.1*   MCV  84  85  85   PLT  220  223  222      Most Recent 3 BMP's:  Recent Labs   Lab Test  06/15/17   1533  06/15/17   1354  06/09/17   1405   NA  138  136  135   POTASSIUM  4.2  4.7  4.2   CHLORIDE  105  105  101   CO2  24  23  25   BUN  15  14  16   CR  0.85  0.77  0.80   ANIONGAP  9  8  9   BETTIE  8.9  8.6  9.1   GLC  319*  283*  385*     Most Recent 2 LFT's:  Recent Labs   Lab Test 03/04/14 11/30/11   0852  11/02/10   0900   AST   --    --   23   ALT  50*  30  33   ALKPHOS   --    --   72   BILITOTAL   --     --   0.3     Most Recent INR's and Anticoagulation Dosing History:  Anticoagulation Dose History     Recent Dosing and Labs Latest Ref Rng & Units 6/29/2005 7/25/2005 2/4/2009 2/11/2009 11/3/2010 6/9/2017    INR 0.86 - 1.14 1.02 1.06 0.96 0.91 1.03 1.18(H)        Most Recent 3 Troponin's:  Recent Labs   Lab Test  06/16/17   0240  06/15/17   2200  06/15/17   1533   TROPI  0.853*  0.852*  0.961*     Most Recent Cholesterol Panel:  Recent Labs   Lab Test  04/21/17   0440   CHOL  118   LDL  55   HDL  51   TRIG  59     Most Recent 6 Bacteria Isolates From Any Culture (See EPIC Reports for Culture Details):No lab results found.  Most Recent TSH, T4 and A1c Labs:  Recent Labs   Lab Test  04/22/17   0435  04/21/17   0440   TSH   --   3.28   A1C  11.7*   --      Results for orders placed or performed during the hospital encounter of 06/15/17   XR Chest 2 Views    Narrative    XR CHEST 2 VW 6/15/2017 4:08 PM    HISTORY: Pain.    COMPARISON: November 13, 2013.      Impression    IMPRESSION: The lungs are clear. No focal pulmonary opacities. Heart  and mediastinum are unremarkable. No acute cardiopulmonary  abnormalities. Old left rib fractures.    MAHI COLE MD

## 2017-06-17 NOTE — PLAN OF CARE
Problem: Goal Outcome Summary  Goal: Goal Outcome Summary  Outcome: Adequate for Discharge Date Met:  06/17/17  VSS. Tele shows A-fib with BBB with CVR. Pt denies any CP or SOB. Discharge instructions, medication indications/side effects, and follow up instructions discussed w/ patient. Handouts given. All questions answered. All pt belongings are accounted for and sent home w/ pt. Pt left floor via wheelchair and was driven home by his wife.

## 2017-06-17 NOTE — DISCHARGE INSTRUCTIONS
Going Home after an Angioplasty or Stent Placement (Cardiac)  ______________________________________________    Patient Name: Jayro Elder  Date of Procedure: June 17, 2017    Doctor: ***     After you go home:    Have an adult stay with you for 24 hours.    Drink plenty of fluids.    You may eat your normal diet, unless your doctor tells you otherwise.    For 24 hours:    Relax and take it easy.    Do NOT smoke.    Do NOT make any important or legal decisions.    Do NOT drive or operate machines at home or at work.    Do NOT drink alcohol.    Remove the Band-Aid after 24 hours. If there is minor oozing, apply another Band-aid and remove it after 12 hours.    For 2 days, do NOT have sex or do any heavy exercise.    Do NOT take a bath, or use a hot tub or pool for at least 3 days. You may shower.    Care of wrist or arm site  It is normal to have soreness at the puncture site and mild tingling in your hand for up to 3 days.    For 2 days, do not use your hand or arm to support your weight (such as rising from a chair) or bend your wrist (such as lifting a garage door).    For 2 days, do not lift more than 5 pounds or exercise your arm (tennis, golf or bowling).    If you start bleeding from the site in your arm:    Sit down and press firmly on the site with your fingers for 10 minutes. Call your doctor as soon as you can.    If the bleeding stops, sit still and keep your wrist straight for 2 hours.    Medicines    If you have started taking Plavix or Effient, do not stop taking it until you talk to your heart doctor (cardiologist).    If you are on metformin (Glucophage), do not restart it until you have blood tests (within 2 to 3 days after discharge). When your doctor tells you it is safe, you may restart the metformin.    If you have stopped any other medicines, check with your nurse or provider about when to restart them.    Call 911 right away if you have bleeding that is heavy or does not stop.    Call  your doctor if:    You have a large or growing hard lump around the site.    The site is red, swollen, hot or tender.    Blood or fluid is draining from the site.    You have chills or a fever greater than 101 F (38 C).    Your leg or arm feels numb or cool.    You have hives, a rash or unusual itching.      [facility] [place]:  [phone] (7 days a week)      We will contact you tomorrow for follow-up. Number where we can reach you: ***

## 2017-06-17 NOTE — PLAN OF CARE
Problem: Goal Outcome Summary  Goal: Goal Outcome Summary  Outcome: Improving  A/O, VSS, O2sats 95% on RA. Tele afib CVR c BBB, HR 50s-70s. Denies pain. SBA, voiding well. L radial site WDL ex ecchymosis, marked and unchanged throughout shift. TR band off, CMS intact, radial pulse +2. Restart eliquis, continue plavix in AM. Plan for early d/c in AM.

## 2017-06-17 NOTE — PLAN OF CARE
Problem: Goal Outcome Summary  Goal: Goal Outcome Summary  Outcome: No Change  VSS. A&O. Tele: afib w/ BBB. Room air. Independent. Denies pain. Lt radial site is eccymotic, CMS intact, radial pulse +2. Plan for an early discharge this morning. Will continue to monitor.

## 2017-06-19 ENCOUNTER — TELEPHONE (OUTPATIENT)
Dept: FAMILY MEDICINE | Facility: CLINIC | Age: 67
End: 2017-06-19

## 2017-06-19 ENCOUNTER — HOSPITAL ENCOUNTER (OUTPATIENT)
Dept: OCCUPATIONAL THERAPY | Facility: CLINIC | Age: 67
Setting detail: THERAPIES SERIES
End: 2017-06-19
Attending: HOSPITALIST
Payer: COMMERCIAL

## 2017-06-19 PROCEDURE — 40000125 ZZHC STATISTIC OT OUTPT VISIT: Performed by: OCCUPATIONAL THERAPIST

## 2017-06-19 PROCEDURE — 97535 SELF CARE MNGMENT TRAINING: CPT | Mod: GO | Performed by: OCCUPATIONAL THERAPIST

## 2017-06-19 NOTE — TELEPHONE ENCOUNTER
Please contact patient for In-patient follow up.  [phone] (home)     Visit date: 6/17/17  Diagnosis listed:Chest Pain  Number of visits in past 12 months:0/2

## 2017-06-19 NOTE — PROGRESS NOTES
Clinic Care Coordination Contact  Care Team Conversations    CCRN received verbal update from Tre Carbajal RN today.   She performed outreach today for hospital follow up.     Patient was hospitalized 06/15-06/17/17 for chest pain at [facility].         Patient follows closely with Cardiology - last appointment 06/09/2017 w/ Dr. Monique - and has several future appointments scheduled including:    Next 5 appointments (look out 90 days)     Jun 22, 2017  7:45 AM CDT   Return Visit with Johnathan Mckeon MD   [facility] [place] ([facility])    [address] [place] [id number] [phone]            Jun 23, 2017  1:20 PM CDT   [facility] Davion Cord[facility]-C   [facility]    [id number] [place] [id number] [phone]                He is also attending OP Rehab.        Plan:  CCRN will outreach to patient in 1 week for routine follow up and will remain available to patient in that timeframe should he need anything.       Abril Dawson, KRYSTAL  [facility]  Clinic Care Coordinator - [place] and [place] Locations   Direct:  [phone] (voicemail available)

## 2017-06-19 NOTE — TELEPHONE ENCOUNTER
"Hospital/TCU/ED for chronic condition Discharge Protocol    \"Hi, my name is Kimberly Hodge, a registered nurse, and I am calling from [facility].  I am calling to follow up and see how things are going for you after your recent emergency visit/hospital/TCU stay.\"    Tell me how you are doing now that you are home?\" Not too bad and getting around just fine. Has family support around\".      Discharge Instructions    \"Let's review your discharge instructions.  What is/are the follow-up recommendations?  Pt. Response: f/u with PCP in 7 days    \"Has an appointment with your primary care provider been scheduled?\"   Yes. (confirm)       \"When you see the provider, I would recommend that you bring your medications with you.\"    Medications    \"Tell me what changed about your medicines when you discharged?\"    Changes to chronic meds?    0-1    \"What questions do you have about your medications?\"    None     New diagnoses of heart failure, COPD, diabetes, or MI?    No     On insulin: \"Did you start on insulin in the hospital or did you have your insulin dose changed?\"  No         Medication reconciliation completed? Yes  Was MTM referral placed (*Make sure to put transitions as reason for referral)?   No    Call Summary    \"What questions or concerns do you have about your recent visit and your follow-up care?\"     none    \"If you have questions or things don't continue to improve, we encourage you contact us through the main clinic number (give number).  Even if the clinic is not open, triage nurses are available 24/7 to help you.     We would like you to know that our clinic has extended hours (provide information).  We also have urgent care (provide details on closest location and hours/contact info)\"      \"Thank you for your time and take care!\"      Kimberly NICOLE RN, BSN, N  [employer] RN        "

## 2017-06-20 ENCOUNTER — HOSPITAL ENCOUNTER (OUTPATIENT)
Dept: SPEECH THERAPY | Facility: CLINIC | Age: 67
Setting detail: THERAPIES SERIES
End: 2017-06-20
Attending: HOSPITALIST
Payer: COMMERCIAL

## 2017-06-20 ENCOUNTER — HOSPITAL ENCOUNTER (OUTPATIENT)
Dept: PHYSICAL THERAPY | Facility: CLINIC | Age: 67
Setting detail: THERAPIES SERIES
End: 2017-06-20
Attending: HOSPITALIST
Payer: COMMERCIAL

## 2017-06-20 ENCOUNTER — HOSPITAL ENCOUNTER (OUTPATIENT)
Dept: OCCUPATIONAL THERAPY | Facility: CLINIC | Age: 67
Setting detail: THERAPIES SERIES
End: 2017-06-20
Attending: HOSPITALIST
Payer: COMMERCIAL

## 2017-06-20 PROCEDURE — 40000211 ZZHC STATISTIC SLP  DEPARTMENT VISIT: Performed by: SPEECH-LANGUAGE PATHOLOGIST

## 2017-06-20 PROCEDURE — 40000125 ZZHC STATISTIC OT OUTPT VISIT: Performed by: OCCUPATIONAL THERAPIST

## 2017-06-20 PROCEDURE — 40000719 ZZHC STATISTIC PT DEPARTMENT NEURO VISIT: Performed by: PHYSICAL THERAPIST

## 2017-06-20 PROCEDURE — 97532 ZZHC SP COGNITIVE SKILLS EA 15 MIN: CPT | Mod: GN | Performed by: SPEECH-LANGUAGE PATHOLOGIST

## 2017-06-20 PROCEDURE — 97535 SELF CARE MNGMENT TRAINING: CPT | Mod: GO | Performed by: OCCUPATIONAL THERAPIST

## 2017-06-20 PROCEDURE — 97112 NEUROMUSCULAR REEDUCATION: CPT | Mod: GP | Performed by: PHYSICAL THERAPIST

## 2017-06-22 ENCOUNTER — HOSPITAL ENCOUNTER (OUTPATIENT)
Dept: SPEECH THERAPY | Facility: CLINIC | Age: 67
Setting detail: THERAPIES SERIES
End: 2017-06-22
Attending: HOSPITALIST
Payer: COMMERCIAL

## 2017-06-22 ENCOUNTER — OFFICE VISIT (OUTPATIENT)
Dept: CARDIOLOGY | Facility: CLINIC | Age: 67
End: 2017-06-22
Attending: NURSE PRACTITIONER
Payer: COMMERCIAL

## 2017-06-22 ENCOUNTER — HOSPITAL ENCOUNTER (OUTPATIENT)
Dept: PHYSICAL THERAPY | Facility: CLINIC | Age: 67
Setting detail: THERAPIES SERIES
End: 2017-06-22
Attending: HOSPITALIST
Payer: COMMERCIAL

## 2017-06-22 VITALS
WEIGHT: 236.5 LBS | BODY MASS INDEX: 28.8 KG/M2 | SYSTOLIC BLOOD PRESSURE: 118 MMHG | HEIGHT: 76 IN | HEART RATE: 76 BPM | DIASTOLIC BLOOD PRESSURE: 82 MMHG

## 2017-06-22 DIAGNOSIS — I25.119 CORONARY ARTERY DISEASE INVOLVING NATIVE CORONARY ARTERY OF NATIVE HEART WITH ANGINA PECTORIS (H): ICD-10-CM

## 2017-06-22 DIAGNOSIS — I63.432 CEREBROVASCULAR ACCIDENT (CVA) DUE TO EMBOLISM OF LEFT POSTERIOR CEREBRAL ARTERY (H): Primary | ICD-10-CM

## 2017-06-22 DIAGNOSIS — I42.9 CARDIOMYOPATHY (H): ICD-10-CM

## 2017-06-22 DIAGNOSIS — I10 HYPERTENSION GOAL BP (BLOOD PRESSURE) < 140/90: ICD-10-CM

## 2017-06-22 DIAGNOSIS — Z98.890 STATUS POST CORONARY ANGIOGRAM: ICD-10-CM

## 2017-06-22 PROCEDURE — 99214 OFFICE O/P EST MOD 30 MIN: CPT | Performed by: INTERNAL MEDICINE

## 2017-06-22 PROCEDURE — 40000211 ZZHC STATISTIC SLP  DEPARTMENT VISIT: Performed by: SPEECH-LANGUAGE PATHOLOGIST

## 2017-06-22 PROCEDURE — 40000719 ZZHC STATISTIC PT DEPARTMENT NEURO VISIT: Performed by: PHYSICAL THERAPIST

## 2017-06-22 PROCEDURE — 97116 GAIT TRAINING THERAPY: CPT | Mod: GP | Performed by: PHYSICAL THERAPIST

## 2017-06-22 PROCEDURE — 97532 ZZHC SP COGNITIVE SKILLS EA 15 MIN: CPT | Mod: GN | Performed by: SPEECH-LANGUAGE PATHOLOGIST

## 2017-06-22 PROCEDURE — 97112 NEUROMUSCULAR REEDUCATION: CPT | Mod: GP | Performed by: PHYSICAL THERAPIST

## 2017-06-22 RX ORDER — AMLODIPINE BESYLATE 5 MG/1
5 TABLET ORAL DAILY
Qty: 90 TABLET | Refills: 3 | Status: SHIPPED | OUTPATIENT
Start: 2017-06-22 | End: 2018-08-30

## 2017-06-22 NOTE — MR AVS SNAPSHOT
After Visit Summary   6/22/2017    Jayro Elder    MRN: [id number]           Patient Information     Date Of Birth          1950        Visit Information        Provider Department      6/22/2017 7:45 AM Johnathan Mckeon MD [facility] [place]        Today's Diagnoses     Cerebrovascular accident (CVA) due to embolism of left posterior cerebral artery (H)    -  1    Coronary artery disease involving native coronary artery of native heart with angina pectoris (H)        Cardiomyopathy (H)        Hypertension goal BP (blood pressure) < 140/90        Status post coronary angiogram           Follow-ups after your visit        Additional Services     Follow-Up with Cardiologist                 Your next 10 appointments already scheduled     Jun 22, 2017  2:15 PM CDT   Neuro Treatment with Patsy Martinez, PT   [facility] ([facility])    [address] [place] [address]   [phone]            Jun 22, 2017  3:00 PM CDT   Neuro Treatment with Vivian Plunkett, SLP   [facility] ([facility])    [address] [place] [address]   [phone]            Jun 23, 2017  1:20 PM CDT   Office Visit with Davion Cordova PA-C   [facility] ([facility])    [address] [place] [address] [phone]           Bring a current list of meds and any records pertaining to this visit.  For Physicals, please bring immunization records and any forms needing to be filled out.  Please arrive 10 minutes early to complete paperwork.            Jun 26, 2017  1:45 PM CDT   Neuro Treatment with Chloé Luu OT   [facility] ([facility])    [address] [place] [address]   [phone]            Jun 28, 2017  2:15 PM CDT   Neuro Treatment with Denisa Hannon, PT   [facility]  [place] [facility] ([facility])    [address] [facility] [address]   [phone]            Jun 28, 2017  3:00 PM CDT   Neuro Treatment with Vivian Plunkett, SLP   [facility] ([facility])    [address] [facility] [address]   [phone]            Jun 29, 2017  3:15 PM CDT   Neuro Treatment with Chloé Luu, OT   [facility] ([facility])    [address] [facility] [address]   [phone]            Jun 30, 2017  2:00 PM CDT   Neuro Treatment with Vivian Plunkett, SLP   [facility] ([facility])    [address] [address]   [phone]            Jun 30, 2017  3:15 PM CDT   Neuro Treatment with Patsy Martinez, PT   [facility] ([facility])    [address] [address]   [phone]            Jul 11, 2017  3:15 PM CDT   Neuro Treatment with Chloé Luu, OT   [facility] ([facility])    [address] [facility] [address]   [phone]              Future tests that were ordered for you today     Open Future Orders        Priority Expected Expires Ordered    Follow-Up with Cardiologist Routine 8/22/2017 6/22/2018 6/22/2017            Who to contact     If you have questions or need follow up information about today's clinic visit or your schedule please contact [facility] [place] directly at [phone].  Normal or non-critical lab and imaging results will be communicated to you by MyChart, letter or phone within 4 business days after the clinic has received the results. If you do not hear from us within 7 days, please contact the clinic through MyChart or phone. If you have a critical or abnormal lab result, we will notify you by phone as soon as possible.  Submit refill  "requests through [employer] or call your pharmacy and they will forward the refill request to us. Please allow 3 business days for your refill to be completed.          Additional Information About Your Visit        [employer] Information     [employer] lets you send messages to your doctor, view your test results, renew your prescriptions, schedule appointments and more. To sign up, go to [email or web address] . Click on \"Log in\" on the left side of the screen, which will take you to the Welcome page. Then click on \"Sign up Now\" on the right side of the page.     You will be asked to enter the access code listed below, as well as some personal information. Please follow the directions to create your username and password.     Your access code is: [email or web address]  Expires: 2017 11:13 AM     Your access code will  in 90 days. If you need help or a new code, please call your [place] clinic or [phone].        Care EveryWhere ID     This is your Care EveryWhere ID. This could be used by other organizations to access your [place] medical records  [id number]        Your Vitals Were     Pulse Height BMI (Body Mass Index)             76 1.93 m (6' 4\") 28.79 kg/m2          Blood Pressure from Last 3 Encounters:   17 118/82   17 139/87   17 124/62    Weight from Last 3 Encounters:   17 107.3 kg (236 lb 8 oz)   17 101.4 kg (223 lb 8.7 oz)   17 104.8 kg (231 lb)              We Performed the Following     Follow-Up with Cardiologist          Today's Medication Changes          These changes are accurate as of: 17  8:36 AM.  If you have any questions, ask your nurse or doctor.               These medicines have changed or have updated prescriptions.        Dose/Directions    amLODIPine 5 MG tablet   Commonly known as:  NORVASC   This may have changed:  when to take this   Used for:  Coronary artery disease involving native coronary artery of native heart with angina pectoris (H) "   Changed by:  Johnathan Mckeon MD        Dose:  5 mg   Take 1 tablet (5 mg) by mouth daily   Quantity:  90 tablet   Refills:  3       ELIQUIS 5 MG tablet   This may have changed:  Another medication with the same name was removed. Continue taking this medication, and follow the directions you see here.   Generic drug:  apixaban ANTICOAGULANT   Changed by:  Johnathan Mckeon MD        Take by mouth 2 times daily   Refills:  0         Stop taking these medicines if you haven't already. Please contact your care team if you have questions.     ASPIRIN PO   Stopped by:  Johnathan Mckeon MD                Where to get your medicines      These medications were sent to [id number] - [place], [place] - [address] [address]   [address] [address] , [facility] [address]     Phone:  [phone]     amLODIPine 5 MG tablet                Primary Care Provider Office Phone # Fax #    Davion Cordova PA-C [phone]       [facility] [address] [facility] [address]        Equal Access to Services     [facility]: Hadii aad ku hadasho Soomaali, waaxda luqadaha, qaybta kaalmada adeegyada, waxay idiin hayaan adeeg khararadu monet . So [facility] [phone].    ATENCIÓN: Si habla español, tiene a renteria disposición servicios gratuitos de asistencia lingüística. Llame al [phone].    We comply with applicable federal civil rights laws and [place] laws. We do not discriminate on the basis of race, color, national origin, age, disability sex, sexual orientation or gender identity.            Thank you!     Thank you for choosing [facility] [place]  for your care. Our goal is always to provide you with excellent care. Hearing back from our patients is one way we can continue to improve our services. Please take a few minutes to complete the written survey that you may receive in the mail after your visit with us. Thank you!             Your Updated Medication  List - Protect others around you: Learn how to safely use, store and throw away your medicines at www.disposemymeds.org.          This list is accurate as of: 6/22/17  8:36 AM.  Always use your most recent med list.                   Brand Name Dispense Instructions for use Diagnosis    amLODIPine 5 MG tablet    NORVASC    90 tablet    Take 1 tablet (5 mg) by mouth daily    Coronary artery disease involving native coronary artery of native heart with angina pectoris (H)       Blood Pressure Monitor Kit     1 kit    Use to monitor blood pressure daily or as directed    Hypertension goal BP (blood pressure) < 140/90       clopidogrel 75 MG tablet    PLAVIX    90 tablet    Take 1 tablet (75 mg) by mouth daily    Coronary artery disease involving native coronary artery of native heart with other form of angina pectoris (H)       D3 ADULT PO      Take 2,000 Units by mouth daily        ELIQUIS 5 MG tablet   Generic drug:  apixaban ANTICOAGULANT      Take by mouth 2 times daily        insulin glargine 100 UNIT/ML injection    LANTUS     Inject 40 Units Subcutaneous every 24 hours Daily at 1700        isosorbide mononitrate 30 MG 24 hr tablet    IMDUR    90 tablet    Take 1 tablet (30 mg) by mouth daily    Hypertension goal BP (blood pressure) < 140/90       levothyroxine 137 MCG tablet    SYNTHROID/LEVOTHROID    90 tablet    Take 137 mcg by mouth At Bedtime        lisinopril 20 MG tablet    PRINIVIL/ZESTRIL     Take 20 mg by mouth 2 times daily        metFORMIN 1000 MG tablet    GLUCOPHAGE    60 tablet    Take 1 tablet (1,000 mg) by mouth 2 times daily (with meals)    Type II or unspecified type diabetes mellitus without mention of complication, not stated as uncontrolled       metoprolol 100 MG 24 hr tablet    TOPROL-XL    90 tablet    Take 1 tablet (100 mg) by mouth At Bedtime    Coronary artery disease involving native coronary artery of native heart without angina pectoris       nitroglycerin 0.4 MG sublingual tablet     NITROSTAT    50 tablet    Place 1 tablet (0.4 mg) under the tongue every 5 minutes as needed for chest pain    Other chest pain       ONE TOUCH LANCETS Misc     120    use qid        ONE TOUCH ULTRA test strip   Generic drug:  blood glucose monitoring     120    use qid    Type II or unspecified type diabetes mellitus without mention of complication, not stated as uncontrolled       order for DME     1 each    Equipment being ordered: Cane () Treatment Diagnosis: Impaired balance    Hemorrhagic stroke (H)       pantoprazole 40 MG EC tablet    PROTONIX    30 tablet    Take 1 tablet (40 mg) by mouth every morning (before breakfast)    GERD (gastroesophageal reflux disease)       simvastatin 40 MG tablet    ZOCOR    90 tablet    Take 1 tablet (40 mg) by mouth At Bedtime    Coronary artery disease involving native coronary artery of native heart without angina pectoris

## 2017-06-22 NOTE — PROGRESS NOTES
HISTORY OF PRESENT ILLNESS:  I saw Jayro Elder today who was in the hospital earlier this month because of chest and back tightness that very much suggested angina.  Coronary angiography was done and showed a patent LAD stent that he received with a previous anteroapical infarction back in 2005.  This was nicely patent.  He had a moderately severe stenosis of his OM3 which was dilated and stented with an excellent acute angiographic result.  This was a drug-eluting stent.  He was discharged and then subsequently returned 2-3 days later with further chest pains.  It was not felt to be angina but a coronary angiogram was repeated showing widely patent vessels and no change in his coronary anatomy.      Since that time he has had really no recurrence of either vague chest symptoms or classic anginal symptoms which in his case seems to be upper mid back and across the anterior chest.  Some of his concerns were understandably driven by anxiety that any chest pains might be recurrent cardiac issues.  Accordingly, in the face of his previous LAD stents, a WaveWire showed appropriate and normal flow.  The RCA had mild diffuse disease with a 30% mid stenosis and nothing certainly flow limiting.      He has a history also recently as you know, of having had a stroke with some visual loss and some weakness.  He is recovering nicely although his vision continues to be impaired.  With the atrial fibrillation, his heart rates have been controlled on the medication program noted below.  Because of his history of hypertension and atrial fibrillation he has been placed on long-term anticoagulation.  On discharge, he was on Eliquis 2.5 mg twice a day, now on 5 mg twice a day.      With his hypertension he was discharged on:   1.  Amlodipine 5 mg b.i.d.   2.  Plavix 75 mg a day.   3.  Lisinopril 20 mg b.i.d.   4.  Imdur 30 mg a day.   5.  Metoprolol 100 mg at bedtime.   6.  Simvastatin 40 mg daily.   7.  Metformin 1000 mg b.i.d.    8.  Protonix 40 mg a day.   9. Levothyroxine 137 mcg daily.   10.  Insulin per schedule.      He now is in controlled atrial fibrillation and relatively asymptomatic with that.  For the time being I was not going to blanca restoring sinus rhythm.  I will be seeing him in a couple of months and will discuss the issues at that time.  At age 66 I often will try to restore sinus rhythm but in his unique case if he is asymptomatic, I am not going to push the issue.  With his history of stroke and atrial fibrillation, he probably should be on anticoagulation like Eliquis for the rest of his life.  This might be altered should he have complex bleeding issues.      Accordingly, he has been on 3 anticoagulants including aspirin 81 mg a day, Eliquis 5 mg b.i.d. and Plavix 75 mg a day which he should take at least for 1 year to the anniversary of his most recent stenting which would be 06/2018.      He needs to be on long-term statin therapy.  His LDLs have been wonderful in the 40-50 range and this is nicely treated by his simvastatin 40 mg at bedtime.      PHYSICAL EXAMINATION:   GENERAL:  Shows a well-developed, well-nourished male.   VITAL SIGNS:  Blood pressure 118/80, heart rate 70 beats per minute and faintly irregular but typical of his controlled atrial fibrillation.  He weighs 236 pounds.   HEAD:  Normal.   NECK:  He had no neck vein distention or bruit.   HEART:  Faintly irregular without gallop or murmur.   LUNGS:  Clear.   ABDOMEN:  Soft without organomegaly.   EXTREMITIES:  Showed bilateral +1-+2 edema, likely a consequence of the amlodipine therapy.  His left radial artery which was the access point for his 2 cardiac catheterizations in a row was faintly bruised but no hematoma and the left hand was in good shape.      IMPRESSION:   1.  Angina pectoris, treated with stenting of the left circumflex marginal to the inferolateral wall.  This has been associated with chest pain and an abnormal stress nuclear study.    2.  Mild to moderate in-stent restenosis of his LAD stents but not flow limiting.   3.  A discussion of anticoagulation led me to recommend stopping the aspirin and continuing Plavix and Eliquis as noted above.   4.  Long-term anticoagulation is likely indicated with his history of atrial fibrillation, stroke and coronary disease.   5.  Treated hypertension.   6.  Mixed hyperlipidemia.   7.  Diabetes mellitus with a recent hemoglobin A1c in 04/2017 of 11.7.     8.  History of left occipital stroke 04/2017 suspected to be due to atrial fibrillation.   9.  Coronary artery disease with 3 previous LAD stents and more recently an obtuse marginal 3   stent.   10.  Mild ischemic cardiomyopathy with LVEFs as low as 35%-40%, more recently 45% by LV gram and angiogram.  Chronic unloading of the left ventricle is indicated.        Today I stopped his aspirin.  I decreased his amlodipine to 5 mg once a day based on his lower leg edema.  I will see him in a couple of months and likely stop the amlodipine also.  A discussion at that time will also occur about restoring sinus rhythm versus using heart rate control which currently seems to be excellent and totally asymptomatic.  I will review these issues with him at that time.  If he has problems, any concerns or disagreements, give me a call.      Over 35 minutes was spent doing his consult; more than half the time counseling.  I reviewed and showed him both of his recent angiograms on the monitor in real time.   I reviewed his medicines,  indications, side effects, possible concerns with regard to his multiple medical problems as listed above.       Warm regards,      Kaleb Mckeon MD       cc:   Davion Cordova PA-C   [facility]   [address] [place]  [address]         KALEB MCKEON MD, [facility]             D: 06/22/2017 09:11   T: 06/22/2017 18:28   MT: MUSA      Name:     PORTER YANCEY   MRN:      [id number]        Account:      [id number]    :      1950           Service Date: 2017      Document: [id number]

## 2017-06-22 NOTE — PROGRESS NOTES
Mr. Jayro Elder is a 66-year-old male with past medical history significant for coronary artery disease with multiple stents in the past, diabetes, hypertension, dyslipidemia, atrial fibrillation, hypothyroidism, gastroesophageal reflux disease, cerebrovascular accident who presented with complaints of chest pain 1 week ago.     Significant CAD with multiple stents and recent  drug-eluting stent to OM3 and multiple stents, stent occlusion, and recent OM stent on 6/14/17. Troponins were mildly elevated but stable ~0.85-0.96.  Cardiology was consulted and coronary angiogram was performed on 6/16/17 showing a patent OM stent. LAD stents with 30-50% instent restenosis. FFR 0.89. RCA has mid 30-40% stenosis. Pain felt likely non cardiac in nature. He did develop a small left radial hematoma with good distal perfusion.  Cardiology recommends:  ---Continue uninterrupted aspirin 81 MG and clopidogrel 75 MG.  Restart Eliquis at 2.5 MG b.i.d (for atrial fibrillation and recent stroke).  This lower dose of Eliquis is recommended given his left radial hematoma for the interim.   ---Follow-up with his establish cardiologist, Dr. Mckeon next week on June 22 at 7:45 AM.  At that time, optimal regimen of antiplatelet/anticoagulant to be addressed. One option would be long-term Coumadin and Clopidogrel, discontinue Aspirin after one month.  Otherwise dose of Eliquis should be increased to 5mg po BID.  ---continue with his lisinopril, Toprol-XL, Imdur and statin.      Uncontrolled diabetes mellitus: HbA1c from April was 11.7.  Due to noncompliance with insulin.  Patient reports he will start using his Lantus.  --- Resume metformin and Lantus for discharge     Hypertension. Resume prior to admission lisinopril, Toprol-XL     Dyslipidemia:  Resume statin.      Hypothyroidism.  Resume Synthroid.      Gastroesophageal reflux disease.  Resume Protonix.      History of subacute left occipital stroke 04/2007.  He has no focal  neurological deficits at this time.  Will continue with his aspirin, Plavix, and apixaban.      Atrial fibrillation with controlled ventricular rate.  Toprol-XL  HTN, HLD, CAD s/p stents x 3 LAD 2005 and 2009, 2013, SUSAN to OM3 on 6/13/17, ischemic cardiomyopathy, CHF, CVA.

## 2017-06-22 NOTE — LETTER
6/22/2017    Davion Cordova PA-C  [facility]  [address]  [facility] [address]    RE: Jayro Jainlala       Dear Colleague,    I had the pleasure of seeing Jayro Elder in the [facility].    I saw Jayro Elder today who was in the hospital earlier this month because of chest and back tightness that very much suggested angina.  Coronary angiography was done and showed a patent LAD stent that he received with a previous anteroapical infarction back in 2005.  This was nicely patent.  He had a moderately severe stenosis of his OM3 which was dilated and stented with an excellent acute angiographic result.  This was a drug-eluting stent.  He was discharged and then subsequently returned 2-3 days later with further chest pains.  It was not felt to be angina but a coronary angiogram was repeated showing widely patent vessels and no change in his coronary anatomy.      Since that time he has had really no recurrence of either vague chest symptoms or classic anginal symptoms which in his case seems to be upper mid back and across the anterior chest.  Some of his concerns were understandably driven by anxiety that any chest pains might be recurrent cardiac issues.  Accordingly, in the face of his previous LAD stents, a WaveWire showed appropriate and normal flow.  The RCA had mild diffuse disease with a 30% mid stenosis and nothing certainly flow limiting.      He has a history also recently as you know, of having had a stroke with some visual loss and some weakness.  He is recovering nicely although his vision continues to be impaired.  With the atrial fibrillation, his heart rates have been controlled on the medication program noted below.  Because of his history of hypertension and atrial fibrillation he has been placed on long-term anticoagulation.  On discharge, he was on Eliquis 2.5 mg twice a day, now on 5 mg twice a day.      With his hypertension he  was discharged on:   1.  Amlodipine 5 mg b.i.d.   2.  Plavix 75 mg a day.   3.  Lisinopril 20 mg b.i.d.   4.  Imdur 30 mg a day.   5.  Metoprolol 100 mg at bedtime.   6.  Simvastatin 40 mg daily.   7.  Metformin 1000 mg b.i.d.   8.  Protonix 40 mg a day.   9. Levothyroxine 137 mcg daily.   10.  Insulin per schedule.      He now is in controlled atrial fibrillation and relatively asymptomatic with that.  For the time being I was not going to blanca restoring sinus rhythm.  I will be seeing him in a couple of months and will discuss the issues at that time.  At age 66 I often will try to restore sinus rhythm but in his unique case if he is asymptomatic, I am not going to push the issue.  With his history of stroke and atrial fibrillation, he probably should be on anticoagulation like Eliquis for the rest of his life.  This might be altered should he have complex bleeding issues.      Accordingly, he has been on 3 anticoagulants including aspirin 81 mg a day, Eliquis 5 mg b.i.d. and Plavix 75 mg a day which he should take at least for 1 year to the anniversary of his most recent stenting which would be 06/2018.      He needs to be on long-term statin therapy.  His LDLs have been wonderful in the 40-50 range and this is nicely treated by his simvastatin 40 mg at bedtime.      PHYSICAL EXAMINATION:   GENERAL:  Shows a well-developed, well-nourished male.   VITAL SIGNS:  Blood pressure 118/80, heart rate 70 beats per minute and faintly irregular but typical of his controlled atrial fibrillation.  He weighs 236 pounds.   HEAD:  Normal.   NECK:  He had no neck vein distention or bruit.   HEART:  Faintly irregular without gallop or murmur.   LUNGS:  Clear.   ABDOMEN:  Soft without organomegaly.   EXTREMITIES:  Showed bilateral +1-+2 edema, likely a consequence of the amlodipine therapy.  His left radial artery which was the access point for his 2 cardiac catheterizations in a row was faintly bruised but no hematoma and the  left hand was in good shape.      Outpatient Encounter Prescriptions as of 6/22/2017   Medication Sig Dispense Refill     apixaban ANTICOAGULANT (ELIQUIS) 5 MG tablet Take by mouth 2 times daily       amLODIPine (NORVASC) 5 MG tablet Take 1 tablet (5 mg) by mouth daily 90 tablet 3     insulin glargine (LANTUS) 100 UNIT/ML injection Inject 40 Units Subcutaneous every 24 hours Daily at 1700        Cholecalciferol (D3 ADULT PO) Take 2,000 Units by mouth daily       clopidogrel (PLAVIX) 75 MG tablet Take 1 tablet (75 mg) by mouth daily 90 tablet 3     lisinopril (PRINIVIL/ZESTRIL) 20 MG tablet Take 20 mg by mouth 2 times daily       isosorbide mononitrate (IMDUR) 30 MG 24 hr tablet Take 1 tablet (30 mg) by mouth daily 90 tablet 3     simvastatin (ZOCOR) 40 MG tablet Take 1 tablet (40 mg) by mouth At Bedtime 90 tablet 3     metoprolol (TOPROL-XL) 100 MG 24 hr tablet Take 1 tablet (100 mg) by mouth At Bedtime 90 tablet 3     Blood Pressure Monitor KIT Use to monitor blood pressure daily or as directed 1 kit 0     nitroglycerin (NITROSTAT) 0.4 MG sublingual tablet Place 1 tablet (0.4 mg) under the tongue every 5 minutes as needed for chest pain 50 tablet 0     order for DME Equipment being ordered: Cane ()  Treatment Diagnosis: Impaired balance 1 each 0     pantoprazole (PROTONIX) 40 MG enteric coated tablet Take 1 tablet (40 mg) by mouth every morning (before breakfast) 30 tablet 0     metFORMIN (GLUCOPHAGE) 1000 MG tablet Take 1 tablet (1,000 mg) by mouth 2 times daily (with meals) 60 tablet 0     levothyroxine (SYNTHROID, LEVOTHROID) 137 MCG tablet Take 137 mcg by mouth At Bedtime  90 tablet 3     ONE TOUCH ULTRA TEST VI STRP use qid 120 11     ONE TOUCH LANCETS MISC use qid 120 4     [DISCONTINUED] ASPIRIN PO Take 81 mg by mouth       [DISCONTINUED] Apixaban (ELIQUIS PO) Take 2.5 mg by mouth 2 times daily        [DISCONTINUED] amLODIPine (NORVASC) 5 MG tablet Take 1 tablet (5 mg) by mouth 2 times daily 180 tablet  3     No facility-administered encounter medications on file as of 6/22/2017.        IMPRESSION:   1.  Angina pectoris, treated with stenting of the left circumflex marginal to the inferolateral wall.  This has been associated with chest pain and an abnormal stress nuclear study.   2.  Mild to moderate in-stent restenosis of his LAD stents but not flow limiting.   3.  A discussion of anticoagulation led me to recommend stopping the aspirin and continuing Plavix and Eliquis as noted above.   4.  Long-term anticoagulation is likely indicated with his history of atrial fibrillation, stroke and coronary disease.   5.  Treated hypertension.   6.  Mixed hyperlipidemia.   7.  Diabetes mellitus with a recent hemoglobin A1c in 04/2017 of 11.7.     8.  History of left occipital stroke 04/2017 suspected to be due to atrial fibrillation.   9.  Coronary artery disease with 3 previous LAD stents and more recently an obtuse marginal 3   stent.   10.  Mild ischemic cardiomyopathy with LVEFs as low as 35%-40%, more recently 45% by LV gram and angiogram.  Chronic unloading of the left ventricle is indicated.        Today I stopped his aspirin.  I decreased his amlodipine to 5 mg once a day based on his lower leg edema.  I will see him in a couple of months and likely stop the amlodipine also.  A discussion at that time will also occur about restoring sinus rhythm versus using heart rate control which currently seems to be excellent and totally asymptomatic.  I will review these issues with him at that time.  If he has problems, any concerns or disagreements, give me a call.      Over 35 minutes was spent doing his consult; more than half the time counseling.  I reviewed and showed him both of his recent angiograms on the monitor in real time.   I reviewed his medicines,  indications, side effects, possible concerns with regard to his multiple medical problems as listed above.       Warm regards,      Again, thank you for allowing me  to participate in the care of your patient.      Sincerely,    KALEB GUTIERREZ MD     [facility]

## 2017-06-23 ENCOUNTER — OFFICE VISIT (OUTPATIENT)
Dept: FAMILY MEDICINE | Facility: CLINIC | Age: 67
End: 2017-06-23
Payer: COMMERCIAL

## 2017-06-23 VITALS
WEIGHT: 235 LBS | SYSTOLIC BLOOD PRESSURE: 110 MMHG | OXYGEN SATURATION: 100 % | HEART RATE: 72 BPM | BODY MASS INDEX: 28.62 KG/M2 | HEIGHT: 76 IN | TEMPERATURE: 98.2 F | DIASTOLIC BLOOD PRESSURE: 74 MMHG

## 2017-06-23 DIAGNOSIS — I63.432 CEREBROVASCULAR ACCIDENT (CVA) DUE TO EMBOLISM OF LEFT POSTERIOR CEREBRAL ARTERY (H): Primary | ICD-10-CM

## 2017-06-23 DIAGNOSIS — I25.10 CORONARY ARTERY DISEASE INVOLVING NATIVE CORONARY ARTERY OF NATIVE HEART WITHOUT ANGINA PECTORIS: ICD-10-CM

## 2017-06-23 DIAGNOSIS — R23.8 OTHER SKIN CHANGES: ICD-10-CM

## 2017-06-23 DIAGNOSIS — E11.42 TYPE 2 DIABETES MELLITUS WITH DIABETIC POLYNEUROPATHY, WITH LONG-TERM CURRENT USE OF INSULIN (H): ICD-10-CM

## 2017-06-23 DIAGNOSIS — Z79.4 TYPE 2 DIABETES MELLITUS WITH DIABETIC POLYNEUROPATHY, WITH LONG-TERM CURRENT USE OF INSULIN (H): ICD-10-CM

## 2017-06-23 DIAGNOSIS — Z23 NEED FOR VACCINATION: ICD-10-CM

## 2017-06-23 DIAGNOSIS — I48.91 ATRIAL FIBRILLATION, UNSPECIFIED TYPE (H): ICD-10-CM

## 2017-06-23 DIAGNOSIS — M79.89 FOOT SWELLING: ICD-10-CM

## 2017-06-23 PROCEDURE — 90715 TDAP VACCINE 7 YRS/> IM: CPT | Performed by: PHYSICIAN ASSISTANT

## 2017-06-23 PROCEDURE — 99214 OFFICE O/P EST MOD 30 MIN: CPT | Mod: 25 | Performed by: PHYSICIAN ASSISTANT

## 2017-06-23 PROCEDURE — 90471 IMMUNIZATION ADMIN: CPT | Performed by: PHYSICIAN ASSISTANT

## 2017-06-23 RX ORDER — CEPHALEXIN 500 MG/1
500 CAPSULE ORAL 3 TIMES DAILY
Qty: 30 CAPSULE | Refills: 0 | Status: SHIPPED | OUTPATIENT
Start: 2017-06-23 | End: 2017-10-19

## 2017-06-23 NOTE — PROGRESS NOTES
"  SUBJECTIVE:                                                    Jayro Elder is a 66 year old male who presents to clinic today for the following health issues:    Hospital Follow-up Visit:    Hospital/Nursing Home/IP Rehab Facility: [facility]  Date of Admission: 6/13/17  Date of Discharge: 6/17/17  Reason(s) for Admission: Chest pain     Patient presents to follow up s/p his recent hospitalizations   He has had a repeat of moderately severe stenosis of his OM3 - this was was dilated and stented per cardiology with a drug-eluting stent.   His LAD stent (2005) was noted to be patent on that same angiography  He unfortunately returned to the ED 2 days later with more chest pain but a repeat angiography showed excellent patency of his arteries  He is on 5mg amlodipine, plavix 75mg, imdur 30mg, lisinopril 20mg twice daily, toprol xl 100mg; continues on simvastatin 40mg which has shown excellent control     He continues with numerous physical/occupational therapy sessions following his CVA      Today he reports they are going to cut his physical therapy back once weekly, and expects that speech therapy will be cut back as well given his progress  OT for vision continues, though he wasn't able to get in as quickly as he needed and worries this delay may have impeded some of potential progress  He is getting rides to all of the appointments with his sons  He remains on eliquis 5mg twice daily     He last saw Dr. Scott within the last month for a diabetes update  He is currently on 40Units in the evening, no mealtime dosing   Also on metformin 2000mg daily  He is noting his fasting blood sugars have been controlled on this dosing - always under 140  Denies any hypoglycemic moments  Last eye exam was \"less than 6 months ago\"    He continues to see Dr. Kelly, podiatry who manages his foot ulcer  Has persistent neuropathy, checking feet daily    Walking with cane, still a bit unsteady but as above, his " physical therapy has helped greatly with weakness            Problems taking medications regularly:  None       Medication changes since discharge: Amlodipine once per day and eliquis 2 times per day         Problems adhering to non-medication therapy:  None    Summary of hospitalization:  [facility] discharge summary reviewed  Diagnostic Tests/Treatments reviewed.  Follow up needed: Multiple specialists; no labs needed today  Other Healthcare Providers Involved in Patient s Care:         Numerous - cardiology, endocrine, neurology, ot/pt  Update since discharge: improved.     Post Discharge Medication Reconciliation: discharge medications reconciled, continue medications without change.  Plan of care communicated with patient          Problem list and histories reviewed & adjusted, as indicated.  Additional history: as documented    Patient Active Problem List   Diagnosis     Generalized osteoarthrosis, unspecified site     Tobacco use disorder     Hypertension goal BP (blood pressure) < 140/90     Benign neoplasm of lower jaw bone     Abdominal pain, right upper quadrant     Diabetes mellitus, type 2 (H)     Cardiovascular disease     Type 2 diabetes, HbA1C goal < 8% (H)     CARDIOVASCULAR SCREENING; LDL GOAL LESS THAN 100     [facility] [place]     Cardiomyopathy (H)     CAD (coronary artery disease)     CVA (cerebral vascular accident) (H)     Coronary artery disease involving native coronary artery of native heart with other form of angina pectoris (H)     Status post coronary angiogram     Acute chest pain     Chest pain     Chronic atrial fibrillation (H)     Past Surgical History:   Procedure Laterality Date     ANGIOGRAM  6/29/05    subtotal occ.mid LAD,SUSAN mid LAD     ANGIOGRAM  7/05    mild CAD,patent stent,no flow-limiting lesions,sev.LV dysf.LAD enlarged     ANGIOGRAM  2/09    Sev.single vessel disease,Mod LV dysf.distal LAD 90%,70-75% mid lad just before prev stent,SUSAN to prox.mid LAD, endeavor to  "distal LAD     ANGIOGRAM  11/13/13    restenosis, stent LAD     C NONSPECIFIC PROCEDURE      Laminectomy x 3 - (1983 x 2 & 1990)     C NONSPECIFIC PROCEDURE Bilateral 1998    Bunionectomy     C NONSPECIFIC PROCEDURE  1959    Gingival surgery at age 9     HEART CATH LEFT HEART CATH  06/13/2017    SUSAN to OM3       Social History   Substance Use Topics     Smoking status: Former Smoker     Packs/day: 1.00     Years: 25.00     Types: Cigarettes     Quit date: 7/25/2005     Smokeless tobacco: Never Used     Alcohol use Yes      Comment: occasional     Family History   Problem Relation Age of Onset     Cancer - colorectal Sister      Thyroid Disease Brother      Cardiovascular Brother      DIABETES Brother      CANCER Father      tumor in chest and throat     Arthritis Mother      Thyroid Disease Mother      DIABETES Mother            Reviewed and updated as needed this visit by clinical staff  Tobacco  Allergies  Med Hx  Surg Hx  Fam Hx  Soc Hx      Reviewed and updated as needed this visit by Provider         ROS:  Constitutional, HEENT, cardiovascular, pulmonary, gi and gu systems are negative, except as otherwise noted.    OBJECTIVE:     /74  Pulse 72  Temp 98.2  F (36.8  C) (Oral)  Ht 6' 4\" (1.93 m)  Wt 235 lb (106.6 kg)  SpO2 100%  BMI 28.61 kg/m2  Body mass index is 28.61 kg/(m^2).  GENERAL: healthy, alert and no distress  EYES: visual field defect along the right periphery  RESP: lungs clear to auscultation - no rales, rhonchi or wheezes  CV: rate controlled, irregular rhythm   MS: mild edema bilateral lower extremiteis  SKIN: there is a heavily scabbed ulcer over the left plantar foot, now with some erythema surrounding which is new  PSYCH: mentation appears normal, affect normal/bright    Diagnostic Test Results:  none     ASSESSMENT/PLAN:   1. Cerebrovascular accident (CVA) due to embolism of left posterior cerebral artery (H)  2. Atrial fibrillation, unspecified type (H)  3. Coronary artery " disease involving native coronary artery of native heart without angina pectoris  Continues with excellent control by Dr. Mckeon and Becca and outside neurology. Recent re-stenting and now without any further symptoms. He did have some lower extremity edema and so amlodipine dose was lowered. His energy level continues to improve. Reviewed cardiac risk factors again with patient. He will follow up as needed.     4. Type 2 diabetes mellitus with diabetic polyneuropathy, with long-term current use of insulin (H)  Continues with Dr. Scott. 40U of lantus nightly. Reportedly much improved control. I have asked him to send us his latest lab results when he gets them.     5. Foot swelling  6. Other skin changes  There is a scabbed ulcer over this plantar foot that is being managed by Dr. Kelly, podiatry. Today there is some increased erythema around the area with warmth. Given his elevated a1c and recent hx we will initiate coverage today and he will plan to scheudle with his podiatrist for close follow up   - cephALEXin (KEFLEX) 500 MG capsule; Take 1 capsule (500 mg) by mouth 3 times daily  Dispense: 30 capsule; Refill: 0    7. Need for vaccination  Reviewed with patient. He does want to hold off on shingles despite my recommendations today. He will need to update his 2nd pneumonia vaccine in June of 2018  - TDAP VACCINE (ADACEL)    Davion Cordova PA-C  [facility]

## 2017-06-23 NOTE — NURSING NOTE
Screening Questionnaire for Adult Immunization    Are you sick today?   No   Do you have allergies to medications, food, a vaccine component or latex?   No   Have you ever had a serious reaction after receiving a vaccination?   No   Do you have a long-term health problem with heart disease, lung disease, asthma, kidney disease, metabolic disease (e.g. diabetes), anemia, or other blood disorder?   No   Do you have cancer, leukemia, HIV/AIDS, or any other immune system problem?   No   In the past 3 months, have you taken medications that affect  your immune system, such as prednisone, other steroids, or anticancer drugs; drugs for the treatment of rheumatoid arthritis, Crohn s disease, or psoriasis; or have you had radiation treatments?   No   Have you had a seizure, or a brain or other nervous system problem?   No   During the past year, have you received a transfusion of blood or blood     products, or been given immune (gamma) globulin or antiviral drug?   No   For women: Are you pregnant or is there a chance you could become        pregnant during the next month?   No   Have you received any vaccinations in the past 4 weeks?   No     Immunization questionnaire answers were all negative.      MNVFC doesn't apply on this patient    Per orders of Ian Cordova, injection of Adecel given by Edwardo Peña. Patient instructed to remain in clinic for 20 minutes afterwards, and to report any adverse reaction to me immediately.       Screening performed by Edwardo Peña on 6/23/2017 at 2:18 PM.

## 2017-06-23 NOTE — NURSING NOTE
"Chief Complaint   Patient presents with     Hospital F/U       Initial /74  Pulse 72  Temp 98.2  F (36.8  C) (Oral)  Ht 6' 4\" (1.93 m)  Wt 235 lb (106.6 kg)  SpO2 100%  BMI 28.61 kg/m2 Estimated body mass index is 28.61 kg/(m^2) as calculated from the following:    Height as of this encounter: 6' 4\" (1.93 m).    Weight as of this encounter: 235 lb (106.6 kg).  Medication Reconciliation: complete   Edwardo Peña CMA        "

## 2017-06-23 NOTE — MR AVS SNAPSHOT
After Visit Summary   6/23/2017    Jayro Elder    MRN: [id number]           Patient Information     Date Of Birth          1950        Visit Information        Provider Department      6/23/2017 1:20 PM Davion Cordova PA-C [facility]        Today's Diagnoses     Foot swelling    -  1    Other skin changes        Cerebrovascular accident (CVA) due to embolism of left posterior cerebral artery (H)        Atrial fibrillation, unspecified type (H)        Type 2 diabetes mellitus with diabetic polyneuropathy, with long-term current use of insulin (H)        Coronary artery disease involving native coronary artery of native heart without angina pectoris           Follow-ups after your visit        Your next 10 appointments already scheduled     Jun 26, 2017  1:45 PM CDT   Neuro Treatment with Chloé Luu, OT   [facility] ([facility])    [address] [id number] [phone]            Jun 28, 2017  3:00 PM CDT   Neuro Treatment with Vivian Plunkett, SLP   [facility] ([facility])    [address] [id number] [phone]            Jun 29, 2017  3:15 PM CDT   Neuro Treatment with Chloé Luu, OT   [facility] ([facility])    [address] [id number] [phone]            Jun 30, 2017  2:00 PM CDT   Neuro Treatment with Vivian Plunkett, SLP   [facility] ([facility])    [address] [id number] [phone]            Jun 30, 2017  3:15 PM CDT   Neuro Treatment with Patsy Martinez, PT   [facility] ([facility])    [address] [id number] [phone]            Jul 11, 2017  2:30 PM CDT   Neuro Treatment with Vivian Plunkett, SLP   [facility]  ([facility])    [address] [facility] [address]   [phone]            Jul 11, 2017  3:15 PM CDT   Neuro Treatment with Chloé Luu, OT   [facility] ([facility])    [address] [facility] [address]   [phone]            Jul 12, 2017  3:15 PM CDT   Neuro Treatment with Patsy Martinez, PT   [facility] ([facility])    [address] [facility] [address]   [phone]            Jul 13, 2017  2:30 PM CDT   Neuro Treatment with Chloé Luu, OT   [facility] ([facility])    [address] [facility] [address]   [phone]            Jul 13, 2017  3:15 PM CDT   Neuro Treatment with Vivian Plunkett, SLP   [facility] Speech Therapy ([facility])    [address] [facility] [address]   [phone]              Future tests that were ordered for you today     Open Future Orders        Priority Expected Expires Ordered    Follow-Up with Cardiologist Routine 8/22/2017 6/22/2018 6/22/2017            Who to contact     If you have questions or need follow up information about today's clinic visit or your schedule please contact [facility] directly at [phone].  Normal or non-critical lab and imaging results will be communicated to you by MyChart, letter or phone within 4 business days after the clinic has received the results. If you do not hear from us within 7 days, please contact the clinic through [employer] or phone. If you have a critical or abnormal lab result, we will notify you by phone as soon as possible.  Submit refill requests through [employer] or call your pharmacy and they will forward the refill request to us. Please allow 3 business days for your refill to be completed.          Additional Information About Your Visit        MyChart Information     [employer] lets you  "send messages to your doctor, view your test results, renew your prescriptions, schedule appointments and more. To sign up, go to [email or web address] . Click on \"Log in\" on the left side of the screen, which will take you to the Welcome page. Then click on \"Sign up Now\" on the right side of the page.     You will be asked to enter the access code listed below, as well as some personal information. Please follow the directions to create your username and password.     Your access code is: [email or web address]  Expires: 2017 11:13 AM     Your access code will  in 90 days. If you need help or a new code, please call your [facility] clinic or [phone].        Care EveryWhere ID     This is your Care EveryWhere ID. This could be used by other organizations to access your [facility] medical records  [id number]        Your Vitals Were     Pulse Temperature Height Pulse Oximetry BMI (Body Mass Index)       72 98.2  F (36.8  C) (Oral) 6' 4\" (1.93 m) 100% 28.61 kg/m2        Blood Pressure from Last 3 Encounters:   17 110/74   17 118/82   17 139/87    Weight from Last 3 Encounters:   17 235 lb (106.6 kg)   17 236 lb 8 oz (107.3 kg)   17 223 lb 8.7 oz (101.4 kg)              Today, you had the following     No orders found for display         Today's Medication Changes          These changes are accurate as of: 17  2:07 PM.  If you have any questions, ask your nurse or doctor.               Start taking these medicines.        Dose/Directions    cephALEXin 500 MG capsule   Commonly known as:  KEFLEX   Used for:  Other skin changes, Foot swelling   Started by:  Davion Cordova PA-C        Dose:  500 mg   Take 1 capsule (500 mg) by mouth 3 times daily   Quantity:  30 capsule   Refills:  0            Where to get your medicines      These medications were sent to [facility]  [facility] - [address]  [address] [address] [place] [address]     " Phone:  [phone]     cephALEXin 500 MG capsule                Primary Care Provider Office Phone # Fax #    Davion Cordova PA-C [phone]       [facility] [address]  [facility] [address]        Equal Access to Services     DANK OLIVEROS : Hadii aad ku hadasho Soomaali, waaxda luqadaha, qaybta kaalmada adeegyada, waxay idiin hayaan adeeg kharash la'willisn ah. So [facility] [phone].    ATENCIÓN: Si habla español, tiene a renteria disposición servicios gratuitos de asistencia lingüística. Bucky al [phone].    We comply with applicable federal civil rights laws and [place] laws. We do not discriminate on the basis of race, color, national origin, age, disability sex, sexual orientation or gender identity.            Thank you!     Thank you for choosing [facility]  for your care. Our goal is always to provide you with excellent care. Hearing back from our patients is one way we can continue to improve our services. Please take a few minutes to complete the written survey that you may receive in the mail after your visit with us. Thank you!             Your Updated Medication List - Protect others around you: Learn how to safely use, store and throw away your medicines at www.disposemymeds.org.          This list is accurate as of: 6/23/17  2:07 PM.  Always use your most recent med list.                   Brand Name Dispense Instructions for use Diagnosis    amLODIPine 5 MG tablet    NORVASC    90 tablet    Take 1 tablet (5 mg) by mouth daily    Coronary artery disease involving native coronary artery of native heart with angina pectoris (H)       Blood Pressure Monitor Kit     1 kit    Use to monitor blood pressure daily or as directed    Hypertension goal BP (blood pressure) < 140/90       cephALEXin 500 MG capsule    KEFLEX    30 capsule    Take 1 capsule (500 mg) by mouth 3 times daily    Other skin changes, Foot swelling       clopidogrel 75 MG tablet     PLAVIX    90 tablet    Take 1 tablet (75 mg) by mouth daily    Coronary artery disease involving native coronary artery of native heart with other form of angina pectoris (H)       D3 ADULT PO      Take 2,000 Units by mouth daily        ELIQUIS 5 MG tablet   Generic drug:  apixaban ANTICOAGULANT      Take by mouth 2 times daily        insulin glargine 100 UNIT/ML injection    LANTUS     Inject 40 Units Subcutaneous every 24 hours Daily at 1700        isosorbide mononitrate 30 MG 24 hr tablet    IMDUR    90 tablet    Take 1 tablet (30 mg) by mouth daily    Hypertension goal BP (blood pressure) < 140/90       levothyroxine 137 MCG tablet    SYNTHROID/LEVOTHROID    90 tablet    Take 137 mcg by mouth At Bedtime        lisinopril 20 MG tablet    PRINIVIL/ZESTRIL     Take 20 mg by mouth 2 times daily        metFORMIN 1000 MG tablet    GLUCOPHAGE    60 tablet    Take 1 tablet (1,000 mg) by mouth 2 times daily (with meals)    Type II or unspecified type diabetes mellitus without mention of complication, not stated as uncontrolled       metoprolol 100 MG 24 hr tablet    TOPROL-XL    90 tablet    Take 1 tablet (100 mg) by mouth At Bedtime    Coronary artery disease involving native coronary artery of native heart without angina pectoris       nitroglycerin 0.4 MG sublingual tablet    NITROSTAT    50 tablet    Place 1 tablet (0.4 mg) under the tongue every 5 minutes as needed for chest pain    Other chest pain       ONE TOUCH LANCETS Misc     120    use qid        ONE TOUCH ULTRA test strip   Generic drug:  blood glucose monitoring     120    use qid    Type II or unspecified type diabetes mellitus without mention of complication, not stated as uncontrolled       order for DME     1 each    Equipment being ordered: Cane () Treatment Diagnosis: Impaired balance    Hemorrhagic stroke (H)       pantoprazole 40 MG EC tablet    PROTONIX    30 tablet    Take 1 tablet (40 mg) by mouth every morning (before breakfast)    GERD  (gastroesophageal reflux disease)       simvastatin 40 MG tablet    ZOCOR    90 tablet    Take 1 tablet (40 mg) by mouth At Bedtime    Coronary artery disease involving native coronary artery of native heart without angina pectoris

## 2017-06-26 ENCOUNTER — HOSPITAL ENCOUNTER (OUTPATIENT)
Dept: OCCUPATIONAL THERAPY | Facility: CLINIC | Age: 67
Setting detail: THERAPIES SERIES
End: 2017-06-26
Attending: HOSPITALIST
Payer: COMMERCIAL

## 2017-06-26 LAB — INTERPRETATION ECG - MUSE: NORMAL

## 2017-06-26 PROCEDURE — 40000125 ZZHC STATISTIC OT OUTPT VISIT: Performed by: OCCUPATIONAL THERAPIST

## 2017-06-26 PROCEDURE — 97535 SELF CARE MNGMENT TRAINING: CPT | Mod: GO | Performed by: OCCUPATIONAL THERAPIST

## 2017-06-27 ENCOUNTER — CARE COORDINATION (OUTPATIENT)
Dept: CARE COORDINATION | Facility: CLINIC | Age: 67
End: 2017-06-27

## 2017-06-27 NOTE — PROGRESS NOTES
Clinic Care Coordination Contact    Situation: Patient chart reviewed by care coordinator.    Background:   Please see 04/27/2017 CCRN Initial Assessment entry.       Assessment:   Patient has been following up closely with his Care Team and attending OP Rehab on routine basis.     PCP - LOV 06/23/2017  Cardiology - LOV 06/22/2017    Multiple future appointments scheduled for OP Rehab (SLP, PT and OT) through 08/31/2017.      Plan/Recommendations:    CCRN will outreach to patient in 2-4 weeks and will remain available to patient in that timeframe should he need anything.  He was provided [facility] Care Plan with writer's contact information on 04/28/2017.    Abril Dawson, KRYSTAL  [facility]  Clinic Care Coordinator - [place] and [place] Locations   Direct:  [phone] (voicemail available)

## 2017-06-27 NOTE — PLAN OF CARE
[employer] Access Code: Activation code not generated  Current [employer] Status: Active    [employer]  A secure, online tool to manage your health information     [employer]’s [employer]® is a secure, online tool that connects you to your personalized health information from the privacy of your home -- day or night - making it very easy for you to manage your healthcare. Once the activation process is completed, you can even access your medical information using the [employer] sophia, which is available for free in the Apple Sophia store or Google Play store.     [employer] provides the following levels of access (as shown below):   My Chart Features   [facility] Primary Care Doctor [facility]  Specialists [facility]  Urgent  Care Non-[facility]  Primary Care  Doctor   Email your healthcare team securely and privately 24/7 X X X X   Manage appointments: schedule your next appointment; view details of past/upcoming appointments X      Request prescription refills. X      View recent personal medical records, including lab and immunizations X X X X   View health record, including health history, allergies, medications X X X X   Read reports about your outpatient visits, procedures, consult and ER notes X X X X   See your discharge summary, which is a recap of your hospital and/or ER visit that includes your diagnosis, lab results, and care plan. X X       How to register for [employer]:  1. Go to  [email or web address].  2. Click on the Sign Up Now box, which takes you to the New Member Sign Up page. You will need to provide the following information:  a. Enter your [employer] Access Code exactly as it appears at the top of this page. (You will not need to use this code after you’ve completed the sign-up process. If you do not sign up before the expiration date, you must request a new code.)   b. Enter your date of birth.   c. Enter your home email address.   d. Click Submit, and follow the next screen’s instructions.  3. Create a [employer] ID. This will  Problem: Goal Outcome Summary  Goal: Goal Outcome Summary  OT: Pt completed simulated med mgmt task independently with pill organizer. Recommend home with assistance from wife as needed and OP vision therapy.        be your [employer] login ID and cannot be changed, so think of one that is secure and easy to remember.  4. Create a [employer] password. You can change your password at any time.  5. Enter your Password Reset Question and Answer. This can be used at a later time if you forget your password.   6. Enter your e-mail address. This allows you to receive e-mail notifications when new information is available in [email or web address].  7. Click Sign Up. You can now view your health information.    For assistance activating your [email or web address] account, call [phone]

## 2017-06-28 ENCOUNTER — HOSPITAL ENCOUNTER (OUTPATIENT)
Dept: SPEECH THERAPY | Facility: CLINIC | Age: 67
Setting detail: THERAPIES SERIES
End: 2017-06-28
Attending: HOSPITALIST
Payer: COMMERCIAL

## 2017-06-28 PROCEDURE — 40000211 ZZHC STATISTIC SLP  DEPARTMENT VISIT: Performed by: SPEECH-LANGUAGE PATHOLOGIST

## 2017-06-28 PROCEDURE — 97532 ZZHC SP COGNITIVE SKILLS EA 15 MIN: CPT | Mod: GN | Performed by: SPEECH-LANGUAGE PATHOLOGIST

## 2017-06-29 ENCOUNTER — HOSPITAL ENCOUNTER (OUTPATIENT)
Dept: OCCUPATIONAL THERAPY | Facility: CLINIC | Age: 67
Setting detail: THERAPIES SERIES
End: 2017-06-29
Attending: HOSPITALIST
Payer: COMMERCIAL

## 2017-06-29 PROCEDURE — 40000125 ZZHC STATISTIC OT OUTPT VISIT: Performed by: OCCUPATIONAL THERAPIST

## 2017-06-29 PROCEDURE — 97535 SELF CARE MNGMENT TRAINING: CPT | Mod: GO | Performed by: OCCUPATIONAL THERAPIST

## 2017-06-30 ENCOUNTER — HOSPITAL ENCOUNTER (OUTPATIENT)
Dept: PHYSICAL THERAPY | Facility: CLINIC | Age: 67
Setting detail: THERAPIES SERIES
End: 2017-06-30
Attending: HOSPITALIST
Payer: COMMERCIAL

## 2017-06-30 ENCOUNTER — HOSPITAL ENCOUNTER (OUTPATIENT)
Dept: SPEECH THERAPY | Facility: CLINIC | Age: 67
Setting detail: THERAPIES SERIES
End: 2017-06-30
Attending: HOSPITALIST
Payer: COMMERCIAL

## 2017-06-30 PROCEDURE — 40000211 ZZHC STATISTIC SLP  DEPARTMENT VISIT: Performed by: SPEECH-LANGUAGE PATHOLOGIST

## 2017-06-30 PROCEDURE — 97532 ZZHC SP COGNITIVE SKILLS EA 15 MIN: CPT | Mod: GN | Performed by: SPEECH-LANGUAGE PATHOLOGIST

## 2017-06-30 PROCEDURE — 97116 GAIT TRAINING THERAPY: CPT | Mod: GP | Performed by: PHYSICAL THERAPIST

## 2017-06-30 PROCEDURE — 40000719 ZZHC STATISTIC PT DEPARTMENT NEURO VISIT: Performed by: PHYSICAL THERAPIST

## 2017-06-30 PROCEDURE — 97112 NEUROMUSCULAR REEDUCATION: CPT | Mod: GP | Performed by: PHYSICAL THERAPIST

## 2017-07-11 ENCOUNTER — HOSPITAL ENCOUNTER (OUTPATIENT)
Dept: OCCUPATIONAL THERAPY | Facility: CLINIC | Age: 67
Setting detail: THERAPIES SERIES
End: 2017-07-11
Attending: HOSPITALIST
Payer: COMMERCIAL

## 2017-07-11 PROCEDURE — 40000125 ZZHC STATISTIC OT OUTPT VISIT: Performed by: OCCUPATIONAL THERAPIST

## 2017-07-11 PROCEDURE — 97535 SELF CARE MNGMENT TRAINING: CPT | Mod: GO | Performed by: OCCUPATIONAL THERAPIST

## 2017-07-13 ENCOUNTER — HOSPITAL ENCOUNTER (OUTPATIENT)
Dept: OCCUPATIONAL THERAPY | Facility: CLINIC | Age: 67
Setting detail: THERAPIES SERIES
End: 2017-07-13
Attending: HOSPITALIST
Payer: COMMERCIAL

## 2017-07-13 ENCOUNTER — HOSPITAL ENCOUNTER (OUTPATIENT)
Dept: SPEECH THERAPY | Facility: CLINIC | Age: 67
Setting detail: THERAPIES SERIES
End: 2017-07-13
Attending: HOSPITALIST
Payer: COMMERCIAL

## 2017-07-13 PROCEDURE — 97535 SELF CARE MNGMENT TRAINING: CPT | Mod: GO | Performed by: OCCUPATIONAL THERAPIST

## 2017-07-13 PROCEDURE — 97532 ZZHC SP COGNITIVE SKILLS EA 15 MIN: CPT | Mod: GN | Performed by: SPEECH-LANGUAGE PATHOLOGIST

## 2017-07-13 PROCEDURE — 40000211 ZZHC STATISTIC SLP  DEPARTMENT VISIT: Performed by: SPEECH-LANGUAGE PATHOLOGIST

## 2017-07-13 PROCEDURE — 40000125 ZZHC STATISTIC OT OUTPT VISIT: Performed by: OCCUPATIONAL THERAPIST

## 2017-07-18 ENCOUNTER — HOSPITAL ENCOUNTER (OUTPATIENT)
Dept: SPEECH THERAPY | Facility: CLINIC | Age: 67
Setting detail: THERAPIES SERIES
End: 2017-07-18
Attending: HOSPITALIST
Payer: COMMERCIAL

## 2017-07-18 PROCEDURE — 40000211 ZZHC STATISTIC SLP  DEPARTMENT VISIT: Performed by: SPEECH-LANGUAGE PATHOLOGIST

## 2017-07-18 PROCEDURE — 97532 ZZHC SP COGNITIVE SKILLS EA 15 MIN: CPT | Mod: GN | Performed by: SPEECH-LANGUAGE PATHOLOGIST

## 2017-07-27 ENCOUNTER — CARE COORDINATION (OUTPATIENT)
Dept: CARE COORDINATION | Facility: CLINIC | Age: 67
End: 2017-07-27

## 2017-07-27 NOTE — PROGRESS NOTES
Clinic Care Coordination Contact  Care Team Conversations    CCRN outreached to patient today for follow up.   He stated that he was doing fine; denied any current questions, concerns or needs at this time.   He DECLINED further outreach from writer saying that it was not necessary going forward.    Plan: Care Coordinator mailed out care coordination introduction letter on 04/28/2017. Care Coordinator will do no further outreaches at this time, at patient request.   Placing on DECLINE status.     Abril Dawson RN  [facility] Care Coordinator - [place] and [place] Locations   Direct:  [phone] (voicemail available)

## 2017-07-27 NOTE — ADDENDUM NOTE
Encounter addended by: Patsy Martinez, PT on: 7/27/2017 10:26 AM<BR>     Actions taken: Flowsheet accepted, Sign clinical note, Episode resolved

## 2017-07-27 NOTE — PROGRESS NOTES
Outpatient Physical Therapy Discharge Note     Patient: Jayro Elder  : 1950    Beginning/End Dates of Reporting Period:  5/10/2017 to 2017    Referring Provider: Froilan Castellon Diagnosis: inability to participate in life roles and tasks due to right visual field cut, balance and gait impairments.       Client Self Report: states wound on left foot is getting smaller but did have redness and a sore on top of right foot - MD put him on abx due to concern for infection.     Objective Measurements:     Objective Measure:  25 foot walk  Details: 9.9 sec. with cues for faster pace - 7.6 sec. no AD         Goals:  Goal Identifier 1   Goal Description Jayro will ambulate 25 feet in 7 seconds or less to indicate reducd level of impairment and greater gait efficiency for home and community ambulation safety.   Target Date 17   Date Met      Progress:  Partially met - see obj measures     Goal Identifier 2   Goal Description Jayro will be able to ambulate community distances (1000 feet or greater) without a cane to return to grocery shopping tasks, leisure activities and caring for grandkids safely.   Target Date 17   Date Met   partially met.     Progress:  Pt states he is walking fine, at times feels afib with longer distance walking.  Usually bringing his cane still with longer distances.       Goal Identifier 3   Goal Description Jayro will be able to negotiate 1 flight of stairs with support of 1 rail in reciprocal pattern for greater ease and safety with accessing all levels of his home.     Target Date 17   Date Met   2017   Progress:  Jayro reports that he is going up/down stairs with no problem using railing.      Goal Identifier 4   Goal Description Jayro will be independent in a home ex and activity program to address and manage his impairments and functional limitations.    Target Date 17   Date Met   17.    Progress:  Met to date     Progress  Toward Goals:   Progress this reporting period: Jayro has met goal 3 and partially met all other goals.  He feels he is doing fine and does not need any further PT.         Plan:  Discharge from therapy.    Discharge:    Reason for Discharge: Patient has met or partially met all goals.  Patient chooses to discontinue therapy.    Equipment Issued: none    Discharge Plan: Patient to continue home program.

## 2017-08-01 ENCOUNTER — HOSPITAL ENCOUNTER (OUTPATIENT)
Dept: OCCUPATIONAL THERAPY | Facility: CLINIC | Age: 67
Setting detail: THERAPIES SERIES
End: 2017-08-01
Attending: HOSPITALIST
Payer: COMMERCIAL

## 2017-08-01 PROCEDURE — 40000125 ZZHC STATISTIC OT OUTPT VISIT

## 2017-08-01 PROCEDURE — 97535 SELF CARE MNGMENT TRAINING: CPT | Mod: GO

## 2017-08-03 ENCOUNTER — HOSPITAL ENCOUNTER (OUTPATIENT)
Dept: OCCUPATIONAL THERAPY | Facility: CLINIC | Age: 67
Setting detail: THERAPIES SERIES
End: 2017-08-03
Attending: HOSPITALIST
Payer: COMMERCIAL

## 2017-08-03 ENCOUNTER — HOSPITAL ENCOUNTER (OUTPATIENT)
Dept: SPEECH THERAPY | Facility: CLINIC | Age: 67
Setting detail: THERAPIES SERIES
End: 2017-08-03
Attending: HOSPITALIST
Payer: COMMERCIAL

## 2017-08-03 PROCEDURE — 40000211 ZZHC STATISTIC SLP  DEPARTMENT VISIT: Performed by: SPEECH-LANGUAGE PATHOLOGIST

## 2017-08-03 PROCEDURE — 40000125 ZZHC STATISTIC OT OUTPT VISIT: Performed by: OCCUPATIONAL THERAPIST

## 2017-08-03 PROCEDURE — 97535 SELF CARE MNGMENT TRAINING: CPT | Mod: GO | Performed by: OCCUPATIONAL THERAPIST

## 2017-08-03 PROCEDURE — 97532 ZZHC SP COGNITIVE SKILLS EA 15 MIN: CPT | Mod: GN | Performed by: SPEECH-LANGUAGE PATHOLOGIST

## 2017-08-04 NOTE — PROGRESS NOTES
Outpatient Speech Language Pathology Discharge Note     Patient: Jayro Elder  : 1950    Beginning/End Dates of Reporting Period:  2017 to 8/3/2017    Referring Provider: Dr. Froilan Benítez    Therapy Diagnosis: Cognitive-linguistic impairments.     Client Self Report: Jayro Elder is a 67 year old y.o.male s/p CVA.  Please refer to the initial outpatient speech-language pathology evaluation dated 2017 for further background information.  Patient has been seen for 15 visits since the start of care addressing his cognitive-linguistic changes as a result of his CVA.  He has been very motivated and cooperative throughout treatment and is demonstrating excellent progress towards all goals.  Currently, Mr. Elder is ready for discharge today and feels like he has made improvements since the start of care.       Objective Measurements:      Goals:  Goal Identifier LTG   Goal Description Patient will improve his cognitive-linguistic skills for communication of daily wants and needs with use of compensatory strategies as needed and at least 90%.   Target Date 17   Date Met   8/3/2017   Progress: Goal met.      Goal Identifier STG 1: Cognition/Attention   Goal Description Patient will be able to shift his attention between therapy tasks and alternate his attention with less than 2x redirection and at least 85% accuracy with moderate cues for improved attention needed for daily living communication and safety needs.   Target Date 17   Date Met   8/3/2017   Progress:Goal Met. Patient is able to shift his attention between different therapy tasks without redirection and 100% accuracy     Goal Identifier STG 2: Cognition/Memory   Goal Description Patient will implement trained memory strategies for improved recall of verbal and nonverbal memory tasks at the moderate level with 85% and minimal assistance needed for daily living communication needs.    Target Date 17   Date Met    8/3/2017   Progress: Goal Met. Patient is able to recall object to object associations after 15 min delay with distractions and 100%,and after 5 day delay with 100%. Auditory/verbal memory recall via APT Memory Level 1 lesson 7 with f:4 choices = 100%, lesson 8 without visual field cues = 70% but improves to 100% with repetition.      Goal Identifier STG 3: Cognition/Reasoning   Goal Description Patient will complete moderate level verbal and nonverbal reasoning tasks including logic, deduction, sequencing and practical problem solving needed for daily living and safety needs with 85% and moderate assistance.    Target Date 08/05/17   Date Met   8/3/2017   Progress: Goal met.      Discharge Reassessment:  Eric Chris-3-  Visual-Auditory Learning Raw score=15, standard score = 112 indicating an above average score for aged-matched peers.  Analysis-Synthesis Raw Score=24, standard score = 107, indicating an above average score for aged-matched peers.     Progress Toward Goals:    Progress this reporting period: Mr. Elder presents with functional cognitive-linguistic skills.  He has met all of his long-term and short-term goals and is functional with his daily living communication needs.  No further outpatient speech-language therapy services indicated at this time.     Plan:  Discharge from therapy.    Discharge:    Reason for Discharge: Patient has met all goals.  No further expectation of progress.    Thank you for the referral of this patient.  If you have any questions regarding the information in this report, please feel free to contact me at [phone].     Vivian Plunkett MA, CCC-SLP  Speech-Language Pathologist  [address] [address]  Fax:  [phone]

## 2017-08-08 ENCOUNTER — HOSPITAL ENCOUNTER (OUTPATIENT)
Dept: OCCUPATIONAL THERAPY | Facility: CLINIC | Age: 67
Setting detail: THERAPIES SERIES
End: 2017-08-08
Attending: HOSPITALIST
Payer: COMMERCIAL

## 2017-08-08 PROCEDURE — 40000125 ZZHC STATISTIC OT OUTPT VISIT: Performed by: OCCUPATIONAL THERAPIST

## 2017-08-08 PROCEDURE — 97535 SELF CARE MNGMENT TRAINING: CPT | Mod: GO | Performed by: OCCUPATIONAL THERAPIST

## 2017-08-09 NOTE — PROGRESS NOTES
Outpatient Occupational Therapy Progress Note     Patient: Jayro Elder  : 1950  Insurance:   Payor/Plan Subscriber Name Rel Member # Group #   [employer] - [employer]  [id number]       [address] [id number]       Beginning/End Dates of Reporting Period:  5/15/17 to 2017    Referring Provider: Froilan Benítez MD    Therapy Diagnosis: Cerebrovascular accident (CVA) due to embolism of left posterior cerebral artery    Client Self Report: Patient went to [place] over the weekend to visit ill father-in-law and family and got back late last night.    Objective Measurements:  Dynavision  On 17: Mode A 51 hits (BNL). Mode B (1 sec): 19 hits (BNL). Mode B divided (2 second lights): Trial 1: 50 hits, 5/10 numbers. Trial 2: 44 hits, 9/10 numbers.     On 17:  Mode A (60 seconds): 42 hits - BNLS (reaction time as follows: L upper quadrant 1.1, L lower 1.3 sec., R upper 1.6 and R lower 1.7 sec.), trial 2 (after educated in use of strategies to compensate for R visual field deficit): 46 hits with slightly improved reaction time in R upper quadrant.  Mode B (60 seconds): 34 hits - BNLS but improved (accuracy in each quadrant: L upper/lower: 56%, R upper: 42%, R lower: 50%).  Improved reaction time with Mode B in R visual field.    Scancourse  Trial 1: L 5/10, R 7/10 in 44 seconds. Trial 2: L 9/10, R 7/10 in 45 seconds. Noted errors in low visual fields. Tends to rush through tasks despite verbal cueing to slow pace.     Pepper Test (VSRT)  Patient participated in the Visual Skills for Reading Test (Pepper Test) to further assess impact of R visual field deficit on reading/scanning tasks.  He read the letters and words with 96% accuracy and read 115 wpm.  Pattern of errors included: misreading and missing R side of words.    Visual acuity screen for distance (Snellen)  with glasses: R eye 20/30, L eye 20/40 and B eyes 20/25    Outcome Measures (most recent score): N/A     Goals:     Goal  Identifier visual scanning skills   Goal Description Patient to demonstrate improved attention to R visual field using compensatory strategies prn as evidenced by at least 90% accuracy with identifying targets/information in the R visual field during visual scanning tasks (both pen/paper and extra personal space) for increased ADL/IADL independence (homemaking tasks, safe navigation, reading, meal prep).   Target Date 08/07/17   Date Met      Progress:  Patient is close to meeting goal for near tasks, though decreased performance with more complex and smaller print tasks.  Patient not yet meeting goal for extrapersonal space, however, improvement noted.     Goal Identifier visual reaction time   Goal Description Patient will demonstrate WFLs  visual reaction time and divided attention for safe independent community mobility (crossing the street, driving, grocery shopping) by scoring WNLs on all modes of the Dynavision.   Target Date 08/07/17   Date Met      Progress:  Goal not met, but improving.  Patient close to meeting 1 of 4 modes.     Goal Identifier meal prep   Goal Description Patient to complete a moderately complex meal prep task using the stove and/or oven with modified independence, for increased safety and independence in the home.   Target Date 08/07/17   Date Met  08/03/17 (per patient report)   Progress: Goal met per patient report as he has returned to cooking at home - not demonstrated in clinic (patient refused).     Goal Identifier problem solving skills   Goal Description Patient will demonstrate the ability to complete moderately complex tasks requiring numerical reasoning and problem solving skills with 90% accuracy to complete home and community tasks accurately and safety (grocery shopping, watching grandkids, managing diabetes, managing household repairs), for maximum independence to function at or near baseline at home and in community.   Target Date 08/07/17   Date Met  08/03/17     Progress:  Goal met in regards to watching grandkids and managing DM - saw MD and MD please with his blood sugars.     Progress Toward Goals:   Progress this reporting period: Patient seen for a total of 10 visits including the initial evaluation (not seen for 1 month after evaluation due to scheduling conflicts). Occupational therapy has primarily addressed patient's visual deficits as they affect his ADL/IADL independence and safety.  Skilled occupational therapy services continues to be appropriate to address visual skills to maximize his visual function for optimal ADL/IADL independence and safety at a frequency of 2x/week, decreasing frequency prn x 6 weeks.    Plan:  Continue therapy per current plan of care.  Continue 2 vision goals above.    Discharge:  No

## 2017-08-10 ENCOUNTER — HOSPITAL ENCOUNTER (OUTPATIENT)
Dept: OCCUPATIONAL THERAPY | Facility: CLINIC | Age: 67
Setting detail: THERAPIES SERIES
End: 2017-08-10
Attending: HOSPITALIST
Payer: COMMERCIAL

## 2017-08-10 PROCEDURE — 40000125 ZZHC STATISTIC OT OUTPT VISIT: Performed by: OCCUPATIONAL THERAPIST

## 2017-08-10 PROCEDURE — 97535 SELF CARE MNGMENT TRAINING: CPT | Mod: GO | Performed by: OCCUPATIONAL THERAPIST

## 2017-08-15 ENCOUNTER — HOSPITAL ENCOUNTER (OUTPATIENT)
Dept: OCCUPATIONAL THERAPY | Facility: CLINIC | Age: 67
Setting detail: THERAPIES SERIES
End: 2017-08-15
Attending: HOSPITALIST
Payer: COMMERCIAL

## 2017-08-15 PROCEDURE — 40000125 ZZHC STATISTIC OT OUTPT VISIT: Performed by: OCCUPATIONAL THERAPIST

## 2017-08-15 PROCEDURE — 97535 SELF CARE MNGMENT TRAINING: CPT | Mod: GO | Performed by: OCCUPATIONAL THERAPIST

## 2017-08-31 ENCOUNTER — HOSPITAL ENCOUNTER (OUTPATIENT)
Dept: OCCUPATIONAL THERAPY | Facility: CLINIC | Age: 67
Setting detail: THERAPIES SERIES
End: 2017-08-31
Attending: HOSPITALIST
Payer: COMMERCIAL

## 2017-08-31 PROCEDURE — 40000125 ZZHC STATISTIC OT OUTPT VISIT: Performed by: OCCUPATIONAL THERAPIST

## 2017-08-31 PROCEDURE — 97535 SELF CARE MNGMENT TRAINING: CPT | Mod: GO | Performed by: OCCUPATIONAL THERAPIST

## 2017-08-31 NOTE — PROGRESS NOTES
Outpatient Occupational Therapy Discharge Note     Patient: Jayro Elder  : 1950  Insurance:   Payor/Plan Subscriber Name Rel Member # Group #   [employer] - [employer]  [id number]       [address] [id number]       Beginning/End Dates of Reporting Period:  17 to 2017    Referring Provider: Froilan Benítez MD    Therapy Diagnosis: Cerebrovascular accident (CVA) due to embolism of left posterior cerebral artery    Client Self Report: Patient reports eye MD doesn't want to see him until 6 months after the stroke, so patient will wait another couple of months before seeing the eye MD.  He states they won't just do a field of vision test until he has a regular eye exam.     Objective Measurements:     Objective Measure: Dynavision   Details: Mode A (60 seconds): 48 hits - BNLS (reaction time as follows: upper quadrants 1.2 sec., L lower 1.0 sec., R lower 1.4 sec.); Mode B (60 seconds): 30 hits - BNLS (accuracy in each quadrant: L upper 38%, L lower: 58%, R upper: 40%, R lower: 54%).  Mode B divided attention: 31 hits and 7/10 numbers - BNLs,  Reaction time ~45% lower L and upper R, 75% upper L and 20% lower R.  1st 2 modes worse than last session and 3rd mode best score so far.       Objective Measure: Scancourse   Details: Trial 1: L 8/11, R 4/10 in 47 seconds. Educated in importance of scanning more to the R due to visual field cut.  Trial 2: L 7/10, R 8/11 in 47 seconds. Improved score and improved attention to the R on 2nd trial, but still BNLs in accuracy and time/speed.      Visual field in horizontal plane via Periometer  Visual field via periometer: R eye: 15 degrees temporal visual field 1st trial (he was anticipating the target coming from the other side) and 25 degrees on 2nd and 3rd trials, and 55 degrees nasal visual field. L eye: 85 degrees temporal visual field and 15 degrees nasal visual field.  Patient appears to continue to demonstrate partial R visual field  cut.      Outcome Measures (most recent score): N/A     Goals:   Goal Identifier visual scanning skills   Goal Description Patient to demonstrate improved attention to R visual field using compensatory strategies prn as evidenced by at least 90% accuracy with identifying targets/information in the R visual field during visual scanning tasks (both pen/paper and extra personal space) for increased ADL/IADL independence (homemaking tasks, safe navigation, reading, meal prep).   Target Date 09/19/17   Date Met      Progress:  Goal not fully met, however, progress made.     Goal Identifier visual reaction time   Goal Description Patient will demonstrate WFLs  visual reaction time and divided attention for safe independent community mobility (crossing the street, driving, grocery shopping) by scoring WNLs on all modes of the Dynavision.   Target Date 09/19/17   Date Met      Progress:  Goal not met.  As of 8/31, he was BNLS in all 4 modes, however, progress was made.       Progress Toward Goals:   Progress this reporting period: Please see progress note 8/8/17 for details of earlier progress.  Patient seen for 3 visits since this time and 13 visits total since the initial evaluation.  Occupational therapy has primarily addressed patient's visual deficits as they affect his ADL/IADL independence and safety.  He demonstrated progress in his ability to compensate for his R visual field deficit, however, continues to demonstrate decreased attention to the R side at times.    Strongly recommend patient have a field of vision test at eye MD clinic to determine if he has enough visual field to drive in the state of [place].  If he has enough visual field, strongly recommend a behind the wheel driving assessment to determine his ability to compensate for his R visual field deficit while behind the wheel.  Patient has resources for places to complete this assessment.    Plan:  Discharge from therapy.    Discharge:    Reason for  Discharge: Skilled occupational therapy services no longer indicated.  Patient instructed in various strategies to compensate for R visual field deficit and is independent with his home program to continue to improve his attention to the R visual field.    Equipment Issued: N/A    Discharge Plan: Patient to continue home program.

## 2017-09-13 DIAGNOSIS — I25.118 CORONARY ARTERY DISEASE INVOLVING NATIVE CORONARY ARTERY OF NATIVE HEART WITH OTHER FORM OF ANGINA PECTORIS (H): ICD-10-CM

## 2017-09-13 RX ORDER — CLOPIDOGREL BISULFATE 75 MG/1
75 TABLET ORAL DAILY
Qty: 90 TABLET | Refills: 2 | Status: SHIPPED | OUTPATIENT
Start: 2017-09-13 | End: 2018-06-18

## 2017-09-13 NOTE — TELEPHONE ENCOUNTER
Received refill request for:  Plavix  Last OV was: 6/22/2017 with Dr. Mckeon  Labs/EKG: n/a  F/U scheduled: 10/19/2017 with Dr. Mckeon  New script sent to: [facility]

## 2017-09-25 ENCOUNTER — DOCUMENTATION ONLY (OUTPATIENT)
Dept: FAMILY MEDICINE | Facility: CLINIC | Age: 67
End: 2017-09-25

## 2017-09-25 NOTE — PROGRESS NOTES
Panel Management Review      Patient has the following on his problem list: None      Composite cancer screening  Chart review shows that this patient is due/due soon for the following Colonoscopy  Summary:    Patient is due/failing the following:   COLONOSCOPY    Action needed:   Patient needs office visit for colon.    Type of outreach:    Sent letter.    Questions for provider review:    None                                                                                                                                    Edwardo Peña [facility]     Chart routed to Care Team .

## 2017-09-25 NOTE — LETTER
September 25, 2017      Jayro MESSINA Jael  [address] [place] [address]        Dear Mr. Jayro Elder,    Our records show that you are currently due for colon cancer screening either with a colonoscopy or a stool test (FIT Test).   Please call our clinic at [phone] to have these orders placed and we can assist you in scheduling this appointment.    If you have already had this completed please contact our clinic so we can update your chart.     If you have further questions or concerns please feel free to contact us via [employer] or by calling our clinic at [phone].

## 2017-10-19 ENCOUNTER — OFFICE VISIT (OUTPATIENT)
Dept: CARDIOLOGY | Facility: CLINIC | Age: 67
End: 2017-10-19
Attending: INTERNAL MEDICINE
Payer: COMMERCIAL

## 2017-10-19 VITALS
HEIGHT: 76 IN | HEART RATE: 72 BPM | WEIGHT: 244 LBS | OXYGEN SATURATION: 95 % | DIASTOLIC BLOOD PRESSURE: 88 MMHG | SYSTOLIC BLOOD PRESSURE: 138 MMHG | BODY MASS INDEX: 29.71 KG/M2

## 2017-10-19 DIAGNOSIS — I10 HYPERTENSION GOAL BP (BLOOD PRESSURE) < 140/90: ICD-10-CM

## 2017-10-19 DIAGNOSIS — I25.119 CORONARY ARTERY DISEASE INVOLVING NATIVE CORONARY ARTERY OF NATIVE HEART WITH ANGINA PECTORIS (H): ICD-10-CM

## 2017-10-19 DIAGNOSIS — H43.813 PVD (POSTERIOR VITREOUS DETACHMENT), BILATERAL: Primary | ICD-10-CM

## 2017-10-19 DIAGNOSIS — Z98.890 STATUS POST CORONARY ANGIOGRAM: ICD-10-CM

## 2017-10-19 DIAGNOSIS — I63.432 CEREBROVASCULAR ACCIDENT (CVA) DUE TO EMBOLISM OF LEFT POSTERIOR CEREBRAL ARTERY (H): ICD-10-CM

## 2017-10-19 PROCEDURE — 99214 OFFICE O/P EST MOD 30 MIN: CPT | Performed by: INTERNAL MEDICINE

## 2017-10-19 NOTE — LETTER
"10/19/2017    Davion Cordova PA-C  [address] [facility] [address]    RE: Jayro W Jael       Dear Colleague,    I had the pleasure of seeing Jayro Elder in the [facility].    Jayro came to see me today.  He is 76.  He has a history of chronic coronary artery disease.  Coronary angiography was done in June, 4 months ago because of a chest pain syndrome that was worrisome.  He had multiple-vessel coronary disease, but it was nonobstructive and no intervention was appropriate.  His chest pains have become less important since then.      He comes today with a history of nonhealing of a left foot, left ankle wound.  He has been going to a podiatrist who \"scrapes\" the wound regularly whenever he sees him.  Along the way it has not really healed.      It is not clear to me if there is a big ischemic component, but I ordered an arterial Doppler study of both lower extremities and he may need imaging subsequently.      I referred him to the [facility] in [facility], and hopefully they can provide expertise that will lead to healing rather than a year's worth of nonhealing, which is what he is confronted with.      PAST MEDICAL HISTORY:   1.  Non-flow-limiting coronary artery disease.   2.  Previous CVA.   3.  Hypertension.   4.  Peripheral vascular disease.   5.  History of tobacco abuse.   6.  Type 2 diabetes mellitus.   7.  Previous myocardial infarction with an ischemic cardiomyopathy.   8.  Previous angioplasty stenting of his LAD.   9.  Multiple angiograms with drug-eluting stents to the LAD and several angiograms subsequently and finally a drug-eluting stent to an OM3.   10.  Diabetes is also noted.      FAMILY HISTORY:  Noncontributory but it was reviewed.      PHYSICAL EXAMINATION:   GENERAL:  Well-developed, well-nourished, tall adult male.  He weighs 244 pounds, up 15 pounds from June.   HEAD AND NECK:  Normal.  Carotids were equal, no bruits " heard.   HEART:  Irregularly irregular without gallop or murmur.   LUNGS:  Clear.   ABDOMEN:  Soft, obese, nontender without pain or mass.   EXTREMITIES:  Show the left foot and left ankle wrapped in an Ace bandage.  I did not have him take it off.      IMPRESSION:   1.  Chronic nonhealing left foot ulcer.  I referred him to Wound [facility] Care.   2.  Possible significant peripheral vascular disease and nonhealing wounds.  Arterial Doppler assessment ordered.   3.  Treated hypertension.   4.  Noncritical coronary artery disease with no angina.   5.  Previous anteroapical infarct with an ischemic cardiomyopathy, LVEF of 40%, which has been stable.   6.  Treated hyperlipidemia.      CURRENT MEDICATIONS:   1.  Aspirin 81 mg a day.   2.  Plavix 75 mg a day.   3.  Eliquis for atrial fibrillation 5 mg b.i.d.   4.  Amlodipine 5 mg a day.   5.  Insulin per schedule.   6.  Lisinopril 20 mg b.i.d.   7.  Isosorbide 30 mg a day.   8.  Simvastatin 40 mg at bedtime.   9.  Toprol 100 mg daily.   10.  Protonix 40 mg a day.   11.  Metformin 1000 mg b.i.d.   12.  Levothyroxine 137 mcg daily.      I really think the more important issue is this nonhealing left foot ulcer.  Clinically he is showing no overt signs of angina or heart failure.  I ordered the Doppler ultrasound, and if it needs further attention we will certainly do so.  I hope that the [facility] can provide him support.     Again, thank you for allowing me to participate in the care of your patient.      Sincerely,    KALEB GUTIERREZ MD     [facility]  cc:   [facility]   [place]

## 2017-10-19 NOTE — PROGRESS NOTES
HPI and Plan:   See dictation          Orders Placed This Encounter   Procedures     US Lower Extremity Arterial Duplex Bilateral     Orders Placed This Encounter   Medications     aspirin 81 MG tablet     Sig: Take by mouth daily     Medications Discontinued During This Encounter   Medication Reason     cephALEXin (KEFLEX) 500 MG capsule Therapy completed     order for DME Medication Reconciliation Clean Up         Encounter Diagnoses   Name Primary?     Coronary artery disease involving native coronary artery of native heart with angina pectoris (H)      Cerebrovascular accident (CVA) due to embolism of left posterior cerebral artery (H)      Hypertension goal BP (blood pressure) < 140/90      Status post coronary angiogram      PVD (posterior vitreous detachment), bilateral Yes       CURRENT MEDICATIONS:  Current Outpatient Prescriptions   Medication Sig Dispense Refill     aspirin 81 MG tablet Take by mouth daily       clopidogrel (PLAVIX) 75 MG tablet Take 1 tablet (75 mg) by mouth daily 90 tablet 2     apixaban ANTICOAGULANT (ELIQUIS) 5 MG tablet Take by mouth 2 times daily       amLODIPine (NORVASC) 5 MG tablet Take 1 tablet (5 mg) by mouth daily 90 tablet 3     insulin glargine (LANTUS) 100 UNIT/ML injection Inject 40 Units Subcutaneous every 24 hours Daily at 1700        Cholecalciferol (D3 ADULT PO) Take 2,000 Units by mouth daily       lisinopril (PRINIVIL/ZESTRIL) 20 MG tablet Take 20 mg by mouth 2 times daily       isosorbide mononitrate (IMDUR) 30 MG 24 hr tablet Take 1 tablet (30 mg) by mouth daily 90 tablet 3     simvastatin (ZOCOR) 40 MG tablet Take 1 tablet (40 mg) by mouth At Bedtime 90 tablet 3     metoprolol (TOPROL-XL) 100 MG 24 hr tablet Take 1 tablet (100 mg) by mouth At Bedtime 90 tablet 3     Blood Pressure Monitor KIT Use to monitor blood pressure daily or as directed 1 kit 0     nitroglycerin (NITROSTAT) 0.4 MG sublingual tablet Place 1 tablet (0.4 mg) under the tongue every 5 minutes as  needed for chest pain 50 tablet 0     pantoprazole (PROTONIX) 40 MG enteric coated tablet Take 1 tablet (40 mg) by mouth every morning (before breakfast) 30 tablet 0     metFORMIN (GLUCOPHAGE) 1000 MG tablet Take 1 tablet (1,000 mg) by mouth 2 times daily (with meals) 60 tablet 0     levothyroxine (SYNTHROID, LEVOTHROID) 137 MCG tablet Take 137 mcg by mouth At Bedtime  90 tablet 3     ONE TOUCH ULTRA TEST VI STRP use qid 120 11     ONE TOUCH LANCETS MISC use qid 120 4       ALLERGIES     Allergies   Allergen Reactions     No Known Allergies        PAST MEDICAL HISTORY:  Past Medical History:   Diagnosis Date     CAD (coronary artery disease) 6/29/05    anterior MI,  PTCA and stent placed in mid LAD     Cardiomyopathy (H)      Essential hypertension, benign 11/13/2002     Generalized osteoarthrosis, unspecified site 11/13/2002     Myocardial infarction      Neuropathy      Tobacco use disorder 11/13/2002     Type II or unspecified type diabetes mellitus without mention of complication, not stated as uncontrolled 7/14/2005       PAST SURGICAL HISTORY:  Past Surgical History:   Procedure Laterality Date     ANGIOGRAM  6/29/05    subtotal occ.mid LAD,SUSAN mid LAD     ANGIOGRAM  7/05    mild CAD,patent stent,no flow-limiting lesions,sev.LV dysf.LAD enlarged     ANGIOGRAM  2/09    Sev.single vessel disease,Mod LV dysf.distal LAD 90%,70-75% mid lad just before prev stent,SUSAN to prox.mid LAD, endeavor to distal LAD     ANGIOGRAM  11/13/13    restenosis, stent LAD     C NONSPECIFIC PROCEDURE      Laminectomy x 3 - (1983 x 2 & 1990)     C NONSPECIFIC PROCEDURE Bilateral 1998    Bunionectomy     C NONSPECIFIC PROCEDURE  1959    Gingival surgery at age 9     HEART CATH LEFT HEART CATH  06/13/2017    SUSAN to OM3       FAMILY HISTORY:  Family History   Problem Relation Age of Onset     Cancer - colorectal Sister      Thyroid Disease Brother      Cardiovascular Brother      DIABETES Brother      CANCER Father      tumor in chest and  "throat     Arthritis Mother      Thyroid Disease Mother      DIABETES Mother        SOCIAL HISTORY:  Social History     Social History     Marital status:      Spouse name: N/A     Number of children: N/A     Years of education: N/A     Social History Main Topics     Smoking status: Former Smoker     Packs/day: 1.00     Years: 25.00     Types: Cigarettes     Quit date: 7/25/2005     Smokeless tobacco: Never Used     Alcohol use Yes      Comment: occasional     Drug use: No     Sexual activity: Yes     Partners: Female     Other Topics Concern     Parent/Sibling W/ Cabg, Mi Or Angioplasty Before 65f 55m? Yes     Caffeine Concern No     3 cups daily     Special Diet Yes     watching carb intake     Exercise No     some walking     Social History Narrative         Review of Systems:  Skin:  Positive for bruising open sores on both feet    Eyes:  Positive for glasses    ENT:  Negative      Respiratory:  Positive for shortness of breath;sleep apnea gets SOB randomly, does not use machine for sleep apnea    Cardiovascular:    Positive for;palpitations;edema    Gastroenterology: Negative      Genitourinary:  Negative      Musculoskeletal:  Positive for back pain;arthritis;joint pain;foot pain    Neurologic:  Positive for numbness or tingling of feet    Psychiatric:  Negative      Heme/Lymph/Imm:  Positive for easy bruising    Endocrine:  Positive for diabetes      Physical Exam:  Vitals: /88 (BP Location: Right arm, Patient Position: Sitting, Cuff Size: Adult Regular)  Pulse 72  Ht 1.93 m (6' 4\")  Wt 110.7 kg (244 lb)  SpO2 95%  BMI 29.7 kg/m2    Constitutional:  cooperative;alert and oriented;well developed;well nourished        Skin:  warm and dry to the touch        Head:  normocephalic        Eyes:  pupils equal and round;sclera white        ENT:  no pallor or cyanosis        Neck:  JVP normal;no carotid bruit        Chest:  clear to auscultation          Cardiac: no murmurs, gallops or rubs detected " irregularly irregular rhythm           mildly irregular    Abdomen:  abdomen soft;no masses;non-tender obese      Vascular: pulses full and equal             0;1+             0;1+            Extremities and Back:  no deformities, clubbing, cyanosis, erythema observed   bilateral LE edema;trace          Neurological:  affect appropriate, oriented to time, person and place;no gross motor deficits          Recent Lab Results:  LIPID RESULTS:  Lab Results   Component Value Date    CHOL 118 04/21/2017    HDL 51 04/21/2017    LDL 55 04/21/2017    TRIG 59 04/21/2017    CHOLHDLRATIO 2.3 02/02/2015       LIVER ENZYME RESULTS:  Lab Results   Component Value Date    AST 23 11/02/2010    ALT 50 (A) 03/04/2014       CBC RESULTS:  Lab Results   Component Value Date    WBC 5.8 06/15/2017    RBC 4.43 06/15/2017    HGB 12.7 (L) 06/15/2017    HCT 37.4 (L) 06/15/2017    MCV 84 06/15/2017    MCH 28.7 06/15/2017    MCHC 34.0 06/15/2017    RDW 13.5 06/15/2017     06/15/2017       BMP RESULTS:  Lab Results   Component Value Date     06/15/2017    POTASSIUM 4.2 06/15/2017    CHLORIDE 105 06/15/2017    CO2 24 06/15/2017    ANIONGAP 9 06/15/2017     (H) 06/15/2017    BUN 15 06/15/2017    CR 0.85 06/15/2017    GFRESTIMATED 90 06/15/2017    GFRESTBLACK >90   GFR Calc   06/15/2017    BETTIE 8.9 06/15/2017        A1C RESULTS:  Lab Results   Component Value Date    A1C 11.7 (H) 04/22/2017       INR RESULTS:  Lab Results   Component Value Date    INR 1.18 (H) 06/09/2017    INR 1.03 11/03/2010           CC  Johnathan Mckeon MD  [address]  [place] [id number]

## 2017-10-19 NOTE — MR AVS SNAPSHOT
After Visit Summary   10/19/2017    Jayro Elder    MRN: [id number]           Patient Information     Date Of Birth          1950        Visit Information        Provider Department      10/19/2017 4:45 PM Johnathan Mckeon MD [facility] HEART [facility]        Today's Diagnoses     PVD (posterior vitreous detachment), bilateral    -  1    Coronary artery disease involving native coronary artery of native heart with angina pectoris (H)        Cerebrovascular accident (CVA) due to embolism of left posterior cerebral artery (H)        Hypertension goal BP (blood pressure) < 140/90        Status post coronary angiogram           Follow-ups after your visit        Your next 10 appointments already scheduled     Nov 10, 2017  9:00 AM CST   US LOWER EXTREMITY ARTERIAL DUPLEX BILATERAL with [facility] ([facility])    [address]  [facility] [address] [phone]           Please bring a list of your medicines (including vitamins, minerals and over-the-counter drugs). Also, tell your doctor about any allergies you may have. Wear comfortable clothes and leave your valuables at home.  You do not need to do anything special to prepare for your exam.  Please call the Imaging Department at your exam site with any questions.              Future tests that were ordered for you today     Open Future Orders        Priority Expected Expires Ordered    US Lower Extremity Arterial Duplex Bilateral Routine 10/26/2017 10/19/2018 10/19/2017            Who to contact     If you have questions or need follow up information about today's clinic visit or your schedule please contact [facility] directly at [phone].  Normal or non-critical lab and imaging results will be communicated to you by MyChart, letter or phone within 4 business days after the clinic has received  "the results. If you do not hear from us within 7 days, please contact the clinic through Oppex or phone. If you have a critical or abnormal lab result, we will notify you by phone as soon as possible.  Submit refill requests through Oppex or call your pharmacy and they will forward the refill request to us. Please allow 3 business days for your refill to be completed.          Additional Information About Your Visit        Oppex Information     Oppex lets you send messages to your doctor, view your test results, renew your prescriptions, schedule appointments and more. To sign up, go to [email or web address] . Click on \"Log in\" on the left side of the screen, which will take you to the Welcome page. Then click on \"Sign up Now\" on the right side of the page.     You will be asked to enter the access code listed below, as well as some personal information. Please follow the directions to create your username and password.     Your access code is: [email or web address]  Expires: 2018  5:01 PM     Your access code will  in 90 days. If you need help or a new code, please call your [place] clinic or [phone].        Care EveryWhere ID     This is your Care EveryWhere ID. This could be used by other organizations to access your [place] medical records  [id number]        Your Vitals Were     Pulse Height Pulse Oximetry BMI (Body Mass Index)          72 1.93 m (6' 4\") 95% 29.7 kg/m2         Blood Pressure from Last 3 Encounters:   10/19/17 138/88   17 110/74   17 118/82    Weight from Last 3 Encounters:   10/19/17 110.7 kg (244 lb)   17 106.6 kg (235 lb)   17 107.3 kg (236 lb 8 oz)              We Performed the Following     Follow-Up with Cardiologist        Primary Care Provider Office Phone # Fax #    Davion Cordova PA-C [phone] [address]  [facility] [address]        Equal Access to Services     DANK OLIVEROS AH: Alan Miller, " raymond gardner, qaybta kakarly rhoades, rivera gaspar alanantonio pikeaarhonda ah. So [facility] [phone].    ATENCIÓN: Si cecy villalobos, tiene a renteria disposición servicios gratuitos de asistencia lingüística. Bucky al [phone].    We comply with applicable federal civil rights laws and [place] laws. We do not discriminate on the basis of race, color, national origin, age, disability, sex, sexual orientation, or gender identity.            Thank you!     Thank you for choosing [facility] [place]  for your care. Our goal is always to provide you with excellent care. Hearing back from our patients is one way we can continue to improve our services. Please take a few minutes to complete the written survey that you may receive in the mail after your visit with us. Thank you!             Your Updated Medication List - Protect others around you: Learn how to safely use, store and throw away your medicines at www.disposemymeds.org.          This list is accurate as of: 10/19/17  5:01 PM.  Always use your most recent med list.                   Brand Name Dispense Instructions for use Diagnosis    amLODIPine 5 MG tablet    NORVASC    90 tablet    Take 1 tablet (5 mg) by mouth daily    Coronary artery disease involving native coronary artery of native heart with angina pectoris (H)       aspirin 81 MG tablet      Take by mouth daily        Blood Pressure Monitor Kit     1 kit    Use to monitor blood pressure daily or as directed    Hypertension goal BP (blood pressure) < 140/90       clopidogrel 75 MG tablet    PLAVIX    90 tablet    Take 1 tablet (75 mg) by mouth daily    Coronary artery disease involving native coronary artery of native heart with other form of angina pectoris (H)       D3 ADULT PO      Take 2,000 Units by mouth daily        ELIQUIS 5 MG tablet   Generic drug:  apixaban ANTICOAGULANT      Take by mouth 2 times daily        insulin glargine 100 UNIT/ML injection     LANTUS     Inject 40 Units Subcutaneous every 24 hours Daily at 1700        isosorbide mononitrate 30 MG 24 hr tablet    IMDUR    90 tablet    Take 1 tablet (30 mg) by mouth daily    Hypertension goal BP (blood pressure) < 140/90       levothyroxine 137 MCG tablet    SYNTHROID/LEVOTHROID    90 tablet    Take 137 mcg by mouth At Bedtime        lisinopril 20 MG tablet    PRINIVIL/ZESTRIL     Take 20 mg by mouth 2 times daily        metFORMIN 1000 MG tablet    GLUCOPHAGE    60 tablet    Take 1 tablet (1,000 mg) by mouth 2 times daily (with meals)    Type II or unspecified type diabetes mellitus without mention of complication, not stated as uncontrolled       metoprolol 100 MG 24 hr tablet    TOPROL-XL    90 tablet    Take 1 tablet (100 mg) by mouth At Bedtime    Coronary artery disease involving native coronary artery of native heart without angina pectoris       nitroGLYcerin 0.4 MG sublingual tablet    NITROSTAT    50 tablet    Place 1 tablet (0.4 mg) under the tongue every 5 minutes as needed for chest pain    Other chest pain       ONE TOUCH LANCETS Misc     120    use qid        ONE TOUCH ULTRA test strip   Generic drug:  blood glucose monitoring     120    use qid    Type II or unspecified type diabetes mellitus without mention of complication, not stated as uncontrolled       pantoprazole 40 MG EC tablet    PROTONIX    30 tablet    Take 1 tablet (40 mg) by mouth every morning (before breakfast)    GERD (gastroesophageal reflux disease)       simvastatin 40 MG tablet    ZOCOR    90 tablet    Take 1 tablet (40 mg) by mouth At Bedtime    Coronary artery disease involving native coronary artery of native heart without angina pectoris

## 2017-10-20 NOTE — PROGRESS NOTES
"HISTORY OF PRESENT ILLNESS:  Jayro came to see me today.  He is 76.  He has a history of chronic coronary artery disease.  Coronary angiography was done in June, 4 months ago because of a chest pain syndrome that was worrisome.  He had multiple-vessel coronary disease, but it was nonobstructive and no intervention was appropriate.  His chest pains have become less important since then.      He comes today with a history of nonhealing of a left foot, left ankle wound.  He has been going to a podiatrist who \"scrapes\" the wound regularly whenever he sees him.  Along the way it has not really healed.      It is not clear to me if there is a big ischemic component, but I ordered an arterial Doppler study of both lower extremities and he may need imaging subsequently.      I referred him to the [facility] in [facility], and hopefully they can provide expertise that will lead to healing rather than a year's worth of nonhealing, which is what he is confronted with.      PAST MEDICAL HISTORY:   1.  Non-flow-limiting coronary artery disease.   2.  Previous CVA.   3.  Hypertension.   4.  Peripheral vascular disease.   5.  History of tobacco abuse.   6.  Type 2 diabetes mellitus.   7.  Previous myocardial infarction with an ischemic cardiomyopathy.   8.  Previous angioplasty stenting of his LAD.   9.  Multiple angiograms with drug-eluting stents to the LAD and several angiograms subsequently and finally a drug-eluting stent to an OM3.   10.  Diabetes is also noted.      FAMILY HISTORY:  Noncontributory but it was reviewed.      PHYSICAL EXAMINATION:   GENERAL:  Well-developed, well-nourished, tall adult male.  He weighs 244 pounds, up 15 pounds from June.   HEAD AND NECK:  Normal.  Carotids were equal, no bruits heard.   HEART:  Irregularly irregular without gallop or murmur.   LUNGS:  Clear.   ABDOMEN:  Soft, obese, nontender without pain or mass.   EXTREMITIES:  Show the left foot and left ankle wrapped in an " Ace bandage.  I did not have him take it off.      IMPRESSION:   1.  Chronic nonhealing left foot ulcer.  I referred him to Wound and Care.   2.  Possible significant peripheral vascular disease and nonhealing wounds.  Arterial Doppler assessment ordered.   3.  Treated hypertension.   4.  Noncritical coronary artery disease with no angina.   5.  Previous anteroapical infarct with an ischemic cardiomyopathy, LVEF of 40%, which has been stable.   6.  Treated hyperlipidemia.      CURRENT MEDICATIONS:   1.  Aspirin 81 mg a day.   2.  Plavix 75 mg a day.   3.  Eliquis for atrial fibrillation 5 mg b.i.d.   4.  Amlodipine 5 mg a day.   5.  Insulin per schedule.   6.  Lisinopril 20 mg b.i.d.   7.  Isosorbide 30 mg a day.   8.  Simvastatin 40 mg at bedtime.   9.  Toprol 100 mg daily.   10.  Protonix 40 mg a day.   11.  Metformin 1000 mg b.i.d.   12.  Levothyroxine 137 mcg daily.      I really think the more important issue is this nonhealing left foot ulcer.  Clinically he is showing no overt signs of angina or heart failure.  I ordered the Doppler ultrasound, and if it needs further attention we will certainly do so.  I hope that the [facility] can provide him support.      cc:   [facility] Clinic   [place]         KALEB GUTIERREZ MD, [facility]             D: 10/19/2017 17:02   T: 10/19/2017 20:08   MT: DARIEL      Name:     PORTER YANCEY   MRN:      [id number]        Account:      [id number]   :      1950           Service Date: 10/19/2017      Document: [id number]

## 2017-10-25 ENCOUNTER — HOSPITAL ENCOUNTER (OUTPATIENT)
Dept: WOUND CARE | Facility: CLINIC | Age: 67
Discharge: HOME | End: 2017-10-25
Attending: PHYSICIAN ASSISTANT | Admitting: PHYSICIAN ASSISTANT
Payer: COMMERCIAL

## 2017-10-25 VITALS
TEMPERATURE: 97.4 F | HEART RATE: 79 BPM | BODY MASS INDEX: 30.32 KG/M2 | DIASTOLIC BLOOD PRESSURE: 72 MMHG | SYSTOLIC BLOOD PRESSURE: 130 MMHG | WEIGHT: 249 LBS | HEIGHT: 76 IN

## 2017-10-25 DIAGNOSIS — L97.422 DIABETIC ULCER OF LEFT MIDFOOT ASSOCIATED WITH TYPE 2 DIABETES MELLITUS, WITH FAT LAYER EXPOSED (H): Primary | ICD-10-CM

## 2017-10-25 DIAGNOSIS — L97.509 TYPE 2 DIABETES MELLITUS WITH DIABETIC TOE ULCER (H): ICD-10-CM

## 2017-10-25 DIAGNOSIS — E11.621 TYPE 2 DIABETES MELLITUS WITH DIABETIC TOE ULCER (H): ICD-10-CM

## 2017-10-25 DIAGNOSIS — E11.621 DIABETIC ULCER OF LEFT MIDFOOT ASSOCIATED WITH TYPE 2 DIABETES MELLITUS, WITH FAT LAYER EXPOSED (H): Primary | ICD-10-CM

## 2017-10-25 PROCEDURE — 11042 DBRDMT SUBQ TIS 1ST 20SQCM/<: CPT

## 2017-10-25 PROCEDURE — A6210 FOAM DRG >16<=48 SQ IN W/O B: HCPCS

## 2017-10-25 PROCEDURE — 11042 DBRDMT SUBQ TIS 1ST 20SQCM/<: CPT | Performed by: PHYSICIAN ASSISTANT

## 2017-10-25 NOTE — PROGRESS NOTES
[facility]    HISTORY OF PRESENT ILLNESS: Mr. Jayro Elder is a 67-year-old gentleman who presents to our clinic for very chronic, bilateral, diabetic foot ulcers. He was previously cared for by podiatry who was regularly debriding his wound and recommended he apply NeoSporin and a bandaid. Jayro did not feel that he made any progress with this method.     Recently saw his cardiologist who ordered arterial studies to assess blood flow. He will be getting in the next month. Has known venous insufficiency and underwent vein ablations.     Does not believe he has had any imaging done on his feet recently. Had an x-ray by podiatry but never an MRI.     OFFLOADING: Doesn't utilize any offloading currently. Owns a DH2 shoe. Tried to get custom insoles and reported that they were not covered by his insurance.     DATE WOUND ACQUIRED: 2016    PAST MEDICAL HISTORY: type 2 DM, neuropathy, MI, generalized osteoarthrosis, HTN, cardiomyopathy, CAD with multiple stents, hypothyroidism, atrial fibrillation    SOCIAL HISTORY: retired, used to work at airport unloading bags    MEDICATIONS:   Current Outpatient Prescriptions   Medication     aspirin 81 MG tablet     clopidogrel (PLAVIX) 75 MG tablet     apixaban ANTICOAGULANT (ELIQUIS) 5 MG tablet     amLODIPine (NORVASC) 5 MG tablet     insulin glargine (LANTUS) 100 UNIT/ML injection     Cholecalciferol (D3 ADULT PO)     lisinopril (PRINIVIL/ZESTRIL) 20 MG tablet     isosorbide mononitrate (IMDUR) 30 MG 24 hr tablet     simvastatin (ZOCOR) 40 MG tablet     metoprolol (TOPROL-XL) 100 MG 24 hr tablet     Blood Pressure Monitor KIT     nitroglycerin (NITROSTAT) 0.4 MG sublingual tablet     pantoprazole (PROTONIX) 40 MG enteric coated tablet     metFORMIN (GLUCOPHAGE) 1000 MG tablet     levothyroxine (SYNTHROID, LEVOTHROID) 137 MCG tablet     ONE TOUCH ULTRA TEST VI STRP     ONE TOUCH LANCETS MISC     No current facility-administered medications for this  "encounter.        REVIEW OF SYSTEMS:  CONSTITUTIONAL: Denies fevers or acute illness  CARDIOVASCULAR: Reports bilateral leg swelling daily - worse in evenings  ENDOCRINE: Reports good glucose control    VITALS: /72  Pulse 79  Temp 97.4  F (36.3  C) (Tympanic)  Ht 1.93 m (6' 4\")  Wt 112.9 kg (249 lb)  BMI 30.31 kg/m2    PHYSICAL EXAM:  GENERAL: Patient is alert and oriented and in no acute distress  CARDIOVASCULAR: bounding DP pulses bilaterally, unable to palpate PT pulse  INTEGUMENTARY:   WOUND ASSESSMENT #1:     Location: R 3rd toe     Size: 0.7 cm x 0.5 cm with a depth of 0.4 cm    Drainage: copious amount of serosanguinous drainage    Wound description: plantar surface of hammer toe, macerated callus with small opening  WOUND ASSESSMENT #2:     Location: left plantar met head     Size: 1 cm x 1 cm with a depth of 1 cm    Drainage: copious amount of serosanguinous drainage    Wound description: heavy macerated callus with deeper cracks throughout     PROCEDURE: Per standing orders, topical 4% lidocaine was applied to the wound by the CMA. Timeout was called and informed consent was obtained. Using a 10 blade a surgical debridement was performed down to and including subcutaneous tissue of <20. Hemostasis was achieved with pressure. The patient tolerated the procedure well.     ASSESSMENT: bilateral diabetic foot ulcerations with fat-layer exposed    PLAN:   1. Primary dressing: Iodofoam; Secondary dressing: gauze  2. Will add on MASON to planned arterial doppler study if possible  3. Educated on importance of glucose control for wound healing  4. Educated on importance of offloading for wound healing. Should start wearing DH2 shoe on left foot. Will need custom-molded insoles for future. This should be covered under medicare.   5. Ordered MRI for both feet to assess for osteomyelitis    FOLLOW-UP: 2 weeks    ROXI SEAMAN PA-C    "

## 2017-10-26 NOTE — DISCHARGE INSTRUCTIONS
[facility] MEDICAL EQUIPMENT  [place]  [address]   [place] [address]  Ph: [phone]  Fax: [phone] [place] [facility]  [address]  [place] [address]  Ph: [phone]  Fax: [phone]   [place]  [address]   [place] [address]  Ph: [phone]  Fax: [phone]   [facility]  [address]   [place] [address]  Ph: [phone]  Fax: [phone] [facility]   [address]  [place], [place] [id number] [phone]   [place]  [address]   [phone]  [place]  [address]  [phone]

## 2017-11-01 ENCOUNTER — HOSPITAL ENCOUNTER (OUTPATIENT)
Dept: MRI IMAGING | Facility: CLINIC | Age: 67
End: 2017-11-01
Attending: PHYSICIAN ASSISTANT
Payer: COMMERCIAL

## 2017-11-01 ENCOUNTER — HOSPITAL ENCOUNTER (OUTPATIENT)
Dept: MRI IMAGING | Facility: CLINIC | Age: 67
Discharge: HOME | End: 2017-11-01
Attending: PHYSICIAN ASSISTANT | Admitting: PHYSICIAN ASSISTANT
Payer: COMMERCIAL

## 2017-11-01 DIAGNOSIS — E11.621 TYPE 2 DIABETES MELLITUS WITH DIABETIC TOE ULCER (H): ICD-10-CM

## 2017-11-01 DIAGNOSIS — L97.509 TYPE 2 DIABETES MELLITUS WITH DIABETIC TOE ULCER (H): ICD-10-CM

## 2017-11-01 DIAGNOSIS — L97.422 DIABETIC ULCER OF LEFT MIDFOOT ASSOCIATED WITH TYPE 2 DIABETES MELLITUS, WITH FAT LAYER EXPOSED (H): ICD-10-CM

## 2017-11-01 DIAGNOSIS — E11.621 DIABETIC ULCER OF LEFT MIDFOOT ASSOCIATED WITH TYPE 2 DIABETES MELLITUS, WITH FAT LAYER EXPOSED (H): ICD-10-CM

## 2017-11-01 LAB
CREAT BLD-MCNC: 0.9 MG/DL (ref 0.66–1.25)
GFR SERPL CREATININE-BSD FRML MDRD: 84 ML/MIN/1.7M2

## 2017-11-01 PROCEDURE — 82565 ASSAY OF CREATININE: CPT

## 2017-11-01 PROCEDURE — 73720 MRI LWR EXTREMITY W/O&W/DYE: CPT | Mod: 50

## 2017-11-01 PROCEDURE — A9585 GADOBUTROL INJECTION: HCPCS | Performed by: PHYSICIAN ASSISTANT

## 2017-11-01 PROCEDURE — 25000128 H RX IP 250 OP 636: Performed by: PHYSICIAN ASSISTANT

## 2017-11-01 RX ORDER — GADOBUTROL 604.72 MG/ML
15 INJECTION INTRAVENOUS ONCE
Status: COMPLETED | OUTPATIENT
Start: 2017-11-01 | End: 2017-11-01

## 2017-11-01 RX ADMIN — GADOBUTROL 15 ML: 604.72 INJECTION INTRAVENOUS at 08:14

## 2017-11-10 ENCOUNTER — HOSPITAL ENCOUNTER (OUTPATIENT)
Dept: CARDIOLOGY | Facility: CLINIC | Age: 67
Discharge: HOME | End: 2017-11-10
Attending: INTERNAL MEDICINE | Admitting: INTERNAL MEDICINE
Payer: COMMERCIAL

## 2017-11-10 ENCOUNTER — HOSPITAL ENCOUNTER (OUTPATIENT)
Dept: ULTRASOUND IMAGING | Facility: CLINIC | Age: 67
End: 2017-11-10
Attending: PHYSICIAN ASSISTANT
Payer: COMMERCIAL

## 2017-11-10 DIAGNOSIS — L97.422 DIABETIC ULCER OF LEFT MIDFOOT ASSOCIATED WITH TYPE 2 DIABETES MELLITUS, WITH FAT LAYER EXPOSED (H): ICD-10-CM

## 2017-11-10 DIAGNOSIS — I25.119 CORONARY ARTERY DISEASE INVOLVING NATIVE CORONARY ARTERY OF NATIVE HEART WITH ANGINA PECTORIS (H): ICD-10-CM

## 2017-11-10 DIAGNOSIS — H43.813 PVD (POSTERIOR VITREOUS DETACHMENT), BILATERAL: ICD-10-CM

## 2017-11-10 DIAGNOSIS — E11.621 TYPE 2 DIABETES MELLITUS WITH DIABETIC TOE ULCER (H): ICD-10-CM

## 2017-11-10 DIAGNOSIS — L97.509 TYPE 2 DIABETES MELLITUS WITH DIABETIC TOE ULCER (H): ICD-10-CM

## 2017-11-10 DIAGNOSIS — E11.621 DIABETIC ULCER OF LEFT MIDFOOT ASSOCIATED WITH TYPE 2 DIABETES MELLITUS, WITH FAT LAYER EXPOSED (H): ICD-10-CM

## 2017-11-10 DIAGNOSIS — I63.432 CEREBROVASCULAR ACCIDENT (CVA) DUE TO EMBOLISM OF LEFT POSTERIOR CEREBRAL ARTERY (H): ICD-10-CM

## 2017-11-10 DIAGNOSIS — Z98.890 STATUS POST CORONARY ANGIOGRAM: ICD-10-CM

## 2017-11-10 DIAGNOSIS — I10 HYPERTENSION GOAL BP (BLOOD PRESSURE) < 140/90: ICD-10-CM

## 2017-11-10 PROCEDURE — 93922 UPR/L XTREMITY ART 2 LEVELS: CPT

## 2017-11-10 PROCEDURE — 93925 LOWER EXTREMITY STUDY: CPT

## 2017-11-13 ENCOUNTER — TELEPHONE (OUTPATIENT)
Dept: CARDIOLOGY | Facility: CLINIC | Age: 67
End: 2017-11-13

## 2017-11-13 DIAGNOSIS — I10 BENIGN ESSENTIAL HYPERTENSION: Primary | ICD-10-CM

## 2017-11-13 RX ORDER — LISINOPRIL 20 MG/1
20 TABLET ORAL 2 TIMES DAILY
Qty: 180 TABLET | Refills: 3 | Status: SHIPPED | OUTPATIENT
Start: 2017-11-13 | End: 2018-11-26

## 2017-11-13 NOTE — TELEPHONE ENCOUNTER
Call made to patient to update him on results of bilateral lower extremity US from 11/10/17 shown below. Informed him of overall normal results other than one questionable area on his right leg showing some backflow. Will route to Dr. Mckeon for review. Patient was also seen in the Wound Clinic on 10/25/17 per referral from Dr. Mckeon. Exercise ABIs and an MRI of the foot were also done. Will also forward results to Layla Ambriz PA-C for review with other results at planned follow up visit with the Wound Clinic. FAYE Christensen RN  ___________________________________________________________________  Study Result   ULTRASOUND BILATERAL LOWER EXTREMITY ARTERIAL DUPLEX  11/10/2017 10:00  AM      HISTORY:  Coronary artery disease involving native coronary artery of  native heart with angina pectoris (H). Cerebrovascular accident (CVA)  due to embolism of left posterior cerebral artery (H). Hypertension  goal BP (blood pressure) < 140/90. Status post coronary angiogram. PVD  (posterior vitreous detachment), bilateral. Nonhealing left foot/ankle  wound.     COMPARISON: MASON from same day.     FINDINGS:  Color Doppler and spectral waveform analysis was performed  throughout the arteries of the right and left lower extremities. The  bilateral common femoral, profunda femoral, superficial femoral,  popliteal, posterior tibial and peroneal arteries are patent with  multiphasic waveforms. The right anterior tibial artery distally has  retrograde flow suggesting backfilling from another vessel. The distal  right anterior tibial artery has an abnormal waveform, suggesting  there may also be backfilling from another vessel.         IMPRESSION:  1. Common femoral, profunda femoral, superficial femoral, popliteal,  posterior tibial and peroneal arteries are patent bilaterally without  evidence of stenosis or occlusion.  2. Bilateral anterior tibial arteries are patent proximally with  multiphasic waveforms.  3. The distal right  anterior tibial artery has retrograde flow.  Abnormal waveform is seen in the left distal anterior tibial artery.     TRUNG SALAZAR DO

## 2017-12-04 ENCOUNTER — TRANSFERRED RECORDS (OUTPATIENT)
Dept: HEALTH INFORMATION MANAGEMENT | Facility: CLINIC | Age: 67
End: 2017-12-04

## 2017-12-04 ENCOUNTER — MEDICAL CORRESPONDENCE (OUTPATIENT)
Dept: HEALTH INFORMATION MANAGEMENT | Facility: CLINIC | Age: 67
End: 2017-12-04

## 2017-12-19 DIAGNOSIS — H53.10 SUBJECTIVE VISION DISTURBANCE: Primary | ICD-10-CM

## 2017-12-20 ENCOUNTER — OFFICE VISIT (OUTPATIENT)
Dept: OPHTHALMOLOGY | Facility: CLINIC | Age: 67
End: 2017-12-20
Payer: COMMERCIAL

## 2017-12-20 DIAGNOSIS — H53.40 VISUAL FIELD DEFECT: Primary | ICD-10-CM

## 2017-12-20 DIAGNOSIS — H53.10 SUBJECTIVE VISION DISTURBANCE: ICD-10-CM

## 2017-12-20 DIAGNOSIS — I61.9 HEMORRHAGIC STROKE (H): ICD-10-CM

## 2017-12-20 PROBLEM — H53.461: Status: ACTIVE | Noted: 2017-12-20

## 2017-12-20 ASSESSMENT — VISUAL ACUITY
OD_CC: 20/25
METHOD: SNELLEN - LINEAR
OS_CC: 20/40
OS_PH_CC: 20/30-1+2
OD_CC+: -2
CORRECTION_TYPE: GLASSES

## 2017-12-20 ASSESSMENT — REFRACTION_WEARINGRX
OD_CYLINDER: +1.50
OS_AXIS: 013
OD_ADD: +2.50
OD_SPHERE: +0.25
SPECS_TYPE: PAL
OS_SPHERE: PLANO
OS_ADD: +2.50
OS_CYLINDER: +1.00
OD_AXIS: 166

## 2017-12-20 ASSESSMENT — REFRACTION_MANIFEST
OD_ADD: +2.50
OS_SPHERE: PLANO
OD_CYLINDER: +1.50
OD_SPHERE: +0.25
OS_ADD: +2.50
OS_AXIS: 165
OS_CYLINDER: +1.00
OD_AXIS: 170

## 2017-12-20 ASSESSMENT — TONOMETRY
OS_IOP_MMHG: 13
OD_IOP_MMHG: 14
IOP_METHOD: ICARE

## 2017-12-20 ASSESSMENT — EXTERNAL EXAM - RIGHT EYE: OD_EXAM: NORMAL

## 2017-12-20 ASSESSMENT — CUP TO DISC RATIO
OS_RATIO: 0.4
OD_RATIO: 0.3

## 2017-12-20 ASSESSMENT — SLIT LAMP EXAM - LIDS
COMMENTS: NORMAL
COMMENTS: NORMAL

## 2017-12-20 ASSESSMENT — CONF VISUAL FIELD
OD_INFERIOR_TEMPORAL_RESTRICTION: 1
OS_INFERIOR_NASAL_RESTRICTION: 1

## 2017-12-20 ASSESSMENT — EXTERNAL EXAM - LEFT EYE: OS_EXAM: NORMAL

## 2017-12-20 NOTE — LETTER
"2017         RE:  :  MRN: Jayro Elder  1950  [id number]     Dear Dr. Berry,    Thank you for asking me to see your very pleasant patient, Jayro Elder, in neuro-ophthalmic consultation.  I would like to thank you for sending your records and I have summarized them in the history of present illness. He presented with his spouse who provided additional history.  My assessment and plan are below.  For further details, please see my attached clinic note.          Assessment & Plan     Jayro Elder is a 67 year old male with the following diagnoses:   1. Visual field defect    2. Subjective vision disturbance    3. Hemorrhagic stroke (H)       Mr. Elder is a 67 year old male with who was referred to clinic by Dr. Berry for vision loss following left PCA hemorrhagic stroke in 2017.  During the stroke he noted a visual field cut on the right side as well as \"lights dancing.\"  He still has the visual field cut over his entire right visual field.  He also notes blurred vision throughout ever since the stroke.    He has a right incongruous inferior homonymous hemianopia.  It appears that he has enough visual field to allow him to drive.  I gave him some information about reading with a right homonymous hemianopia.  Recommend follow up as needed for worsening symptoms.        Again, thank you for allowing me to participate in the care of your patient.      Sincerely,    Kwasi Park MD  Professor, Neuro-Ophthalmology  Department of Ophthalmology and Visual Neurosciences  [facility]    CC: Gael Berry MD  [facility]  [address]  [facility] [address]  VIA Facsimile: [phone]     Davion Cordova PA-C  [address]  [place] [address]  VIA In Basket     Johnathan Mckeon MD  [address]  [place] [address]  VIA In Basket       DX = homonymous hemianopia   "

## 2017-12-20 NOTE — MR AVS SNAPSHOT
After Visit Summary   2017    Jayro Elder    MRN: [id number]           Patient Information     Date Of Birth          1950        Visit Information        Provider Department      2017 8:30 AM Kwasi Park MD [facility] Ophthalmology        Today's Diagnoses     Subjective vision disturbance           Follow-ups after your visit        Who to contact     Please call your clinic at [phone] to:    Ask questions about your health    Make or cancel appointments    Discuss your medicines    Learn about your test results    Speak to your doctor   If you have compliments or concerns about an experience at your clinic, or if you wish to file a complaint, please contact [facility] Patient Relations at [phone] or email us at [email or web address]         Additional Information About Your Visit        MyChart Information     [employer] is an electronic gateway that provides easy, online access to your medical records. With [employer], you can request a clinic appointment, read your test results, renew a prescription or communicate with your care team.     To sign up for [employer] visit the website at [email or web address]   You will be asked to enter the access code listed below, as well as some personal information. Please follow the directions to create your username and password.     Your access code is: [email or web address]  Expires: 2018  4:01 PM     Your access code will  in 90 days. If you need help or a new code, please contact your [facility] Clinic or call [phone] for assistance.        Care EveryWhere ID     This is your Care EveryWhere ID. This could be used by other organizations to access your [facility] medical records  [id number]         Blood Pressure from Last 3 Encounters:   10/25/17 130/72   10/19/17 138/88   17 110/74    Weight from Last 3 Encounters:   10/25/17 112.9 kg (249 lb)    10/19/17 110.7 kg (244 lb)   06/23/17 106.6 kg (235 lb)              We Performed the Following     Glaucoma Top OU        Primary Care Provider Office Phone # Fax #    Davion Cordova PA-C [phone] [address] [facility] [address]        Equal Access to Services     DANK OLIVEROS : Hadii aad ku hadasho Soomaali, waaxda luqadaha, qaybta kaalmada adeegyada, waxay idiin hayaan adeeg kharash la'aan . So [facility] [phone].    ATENCIÓN: Si habla español, tiene a renteria disposición servicios gratuitos de asistencia lingüística. Llame al [phone].    We comply with applicable federal civil rights laws and [place] laws. We do not discriminate on the basis of race, color, national origin, age, disability, sex, sexual orientation, or gender identity.            Thank you!     Thank you for choosing [facility] OPHTHALMOLOGY  for your care. Our goal is always to provide you with excellent care. Hearing back from our patients is one way we can continue to improve our services. Please take a few minutes to complete the written survey that you may receive in the mail after your visit with us. Thank you!             Your Updated Medication List - Protect others around you: Learn how to safely use, store and throw away your medicines at www.disposemymeds.org.          This list is accurate as of: 12/20/17 10:04 AM.  Always use your most recent med list.                   Brand Name Dispense Instructions for use Diagnosis    amLODIPine 5 MG tablet    NORVASC    90 tablet    Take 1 tablet (5 mg) by mouth daily    Coronary artery disease involving native coronary artery of native heart with angina pectoris (H)       aspirin 81 MG tablet      Take by mouth daily        Blood Pressure Monitor Kit     1 kit    Use to monitor blood pressure daily or as directed    Hypertension goal BP (blood pressure) < 140/90       clopidogrel 75 MG tablet    PLAVIX    90 tablet    Take 1 tablet (75 mg) by mouth daily  "   Coronary artery disease involving native coronary artery of native heart with other form of angina pectoris (H)       D3 ADULT PO      Take 2,000 Units by mouth daily        ELIQUIS 5 MG tablet   Generic drug:  apixaban ANTICOAGULANT      Take by mouth 2 times daily        insulin glargine 100 UNIT/ML injection    LANTUS     Inject 40 Units Subcutaneous every 24 hours Daily at 1700        isosorbide mononitrate 30 MG 24 hr tablet    IMDUR    90 tablet    Take 1 tablet (30 mg) by mouth daily    Hypertension goal BP (blood pressure) < 140/90       levothyroxine 137 MCG tablet    SYNTHROID/LEVOTHROID    90 tablet    Take 137 mcg by mouth At Bedtime        lisinopril 20 MG tablet    PRINIVIL/ZESTRIL    180 tablet    Take 1 tablet (20 mg) by mouth 2 times daily    Benign essential hypertension       metFORMIN 1000 MG tablet    GLUCOPHAGE    60 tablet    Take 1 tablet (1,000 mg) by mouth 2 times daily (with meals)    Type II or unspecified type diabetes mellitus without mention of complication, not stated as uncontrolled       metoprolol 100 MG 24 hr tablet    TOPROL-XL    90 tablet    Take 1 tablet (100 mg) by mouth At Bedtime    Coronary artery disease involving native coronary artery of native heart without angina pectoris       nitroGLYcerin 0.4 MG sublingual tablet    NITROSTAT    50 tablet    Place 1 tablet (0.4 mg) under the tongue every 5 minutes as needed for chest pain    Other chest pain       ONETOUCH LANCETS Misc     120    use qid        ONETOUCH ULTRA test strip   Generic drug:  blood glucose monitoring     120    use qid    Type II or unspecified type diabetes mellitus without mention of complication, not stated as uncontrolled       * order for DME     30 days    [facility] Phone [phone] Fax [phone]  Primary Dressing iodofoam   Qty 2 Secondary Dressing 4x4 gauze loaf Qty 1 Secondary Dressing 4\" srriam Qty 30 Secondary Dressing 2 medipore tape Qty 1  Length of Need: 1 month Frequency " of dressing change: daily    Diabetic ulcer of left midfoot associated with type 2 diabetes mellitus, with fat layer exposed (H)       * order for DME     1 Device    [id number] [place] [address] Ph: [phone] Fax: [phone] Knee Roller  Length of Need: 6 months    Diabetic ulcer of left midfoot associated with type 2 diabetes mellitus, with fat layer exposed (H)       pantoprazole 40 MG EC tablet    PROTONIX    30 tablet    Take 1 tablet (40 mg) by mouth every morning (before breakfast)    GERD (gastroesophageal reflux disease)       simvastatin 40 MG tablet    ZOCOR    90 tablet    Take 1 tablet (40 mg) by mouth At Bedtime    Coronary artery disease involving native coronary artery of native heart without angina pectoris       * Notice:  This list has 2 medication(s) that are the same as other medications prescribed for you. Read the directions carefully, and ask your doctor or other care provider to review them with you.

## 2017-12-20 NOTE — PROGRESS NOTES
"       Assessment & Plan     Jayro Elder is a 67 year old male with the following diagnoses:   1. Visual field defect    2. Subjective vision disturbance    3. Hemorrhagic stroke (H)       Mr. Elder is a 67 year old male with who was referred to clinic by Dr. Berry for vision loss following left PCA hemorrhagic stroke in April 2017.  During the stroke he noted a visual field cut on the right side as well as \"lights dancing.\"  He still has the visual field cut over his entire right visual field.  He also notes blurred vision throughout ever since the stroke.    He has a right incongruous inferior homonymous hemianopia.  It appears that he has enough visual field to allow him to drive.  I gave him some information about reading with a right homonymous hemianopia.  Recommend follow up as needed for worsening symptoms.           Attending Physician Attestation:  Complete documentation of historical and exam elements from today's encounter can be found in the full encounter summary report (not reduplicated in this progress note).  I personally obtained the chief complaint(s) and history of present illness.  I confirmed and edited as necessary the review of systems, past medical/surgical history, family history, social history, and examination findings as documented by others; and I examined the patient myself.  I personally reviewed the relevant tests, images, and reports as documented above.  I formulated and edited as necessary the assessment and plan and discussed the findings and management plan with the patient and family. - Kwasi Park MD    "

## 2017-12-20 NOTE — NURSING NOTE
Chief Complaints and History of Present Illnesses   Patient presents with     Vision Loss Right Eye     HPI    Affected eye(s):  Right   Symptoms:     Decreased vision   No double vision   No floaters   No flashes   No itching   No burning   Photophobia         Do you have eye pain now?:  No      Comments:    Patient states he had a stroke in April of this year, this is when vision loss in the RE started.  Patients notes he doesn't get HA, but has one currently due to a cold.     DMII, Last BGL: 151 this morning  A1C: 9.7 a few weeks ago per pt  Lab Results       Component                Value               Date                       A1C                      11.7                04/22/2017                 A1C                      13.1                03/31/2017                 A1C                      10.5                12/02/2014                 A1C                      10.4                03/04/2014                 A1C                      9.2                 11/14/2013                Sada Whittaker December 20, 2017 8:26 AM

## 2018-01-14 ENCOUNTER — OFFICE VISIT (OUTPATIENT)
Dept: URGENT CARE | Facility: URGENT CARE | Age: 68
End: 2018-01-14
Payer: COMMERCIAL

## 2018-01-14 ENCOUNTER — RADIANT APPOINTMENT (OUTPATIENT)
Dept: GENERAL RADIOLOGY | Facility: CLINIC | Age: 68
End: 2018-01-14
Attending: FAMILY MEDICINE
Payer: COMMERCIAL

## 2018-01-14 VITALS
SYSTOLIC BLOOD PRESSURE: 132 MMHG | TEMPERATURE: 98.7 F | OXYGEN SATURATION: 96 % | HEART RATE: 72 BPM | RESPIRATION RATE: 18 BRPM | DIASTOLIC BLOOD PRESSURE: 78 MMHG

## 2018-01-14 DIAGNOSIS — J20.9 ACUTE BRONCHITIS WITH SYMPTOMS > 10 DAYS: Primary | ICD-10-CM

## 2018-01-14 DIAGNOSIS — R05.9 COUGH: ICD-10-CM

## 2018-01-14 DIAGNOSIS — H10.33 ACUTE BACTERIAL CONJUNCTIVITIS OF BOTH EYES: ICD-10-CM

## 2018-01-14 PROCEDURE — 71046 X-RAY EXAM CHEST 2 VIEWS: CPT

## 2018-01-14 PROCEDURE — 99214 OFFICE O/P EST MOD 30 MIN: CPT | Performed by: FAMILY MEDICINE

## 2018-01-14 PROCEDURE — 87801 DETECT AGNT MULT DNA AMPLI: CPT | Performed by: FAMILY MEDICINE

## 2018-01-14 RX ORDER — TOBRAMYCIN 3 MG/ML
1 SOLUTION/ DROPS OPHTHALMIC 3 TIMES DAILY
Qty: 5 ML | Refills: 0 | Status: SHIPPED | OUTPATIENT
Start: 2018-01-14 | End: 2018-01-21

## 2018-01-14 RX ORDER — CODEINE PHOSPHATE AND GUAIFENESIN 10; 100 MG/5ML; MG/5ML
2 SOLUTION ORAL EVERY 4 HOURS PRN
Qty: 120 ML | Refills: 0 | Status: SHIPPED | OUTPATIENT
Start: 2018-01-14 | End: 2018-03-01

## 2018-01-14 RX ORDER — BENZONATATE 200 MG/1
200 CAPSULE ORAL 3 TIMES DAILY PRN
Qty: 21 CAPSULE | Refills: 0 | Status: SHIPPED | OUTPATIENT
Start: 2018-01-14 | End: 2018-03-01

## 2018-01-14 RX ORDER — ALBUTEROL SULFATE 90 UG/1
2 AEROSOL, METERED RESPIRATORY (INHALATION) EVERY 6 HOURS PRN
Qty: 1 INHALER | Refills: 0 | Status: SHIPPED | OUTPATIENT
Start: 2018-01-14 | End: 2018-03-03

## 2018-01-14 NOTE — PROGRESS NOTES
"SUBJECTIVE:   Jayro Elder is a 67 year old male presenting with a chief complaint of cough.  Unable to sleep at night.  Had whitish phlegm, worse at night.  Patient states that coughing is causing sore throat and chest discomfort.  Developed eye mattering and redness for couple of days  Onset of symptoms was 1 month(s) ago.  Course of illness is worsening.    Severity moderate  Current and Associated symptoms: eye mattering, cough  Treatment measures tried include Tylenol/Ibuprofen, Fluids, Rest and cough syrup.  Took Zpak (wife's medication 2 weeks ago)  Predisposing factors include DM, CAD, TOB use, HTN.    Tdap 2017 and Pneumo 2017    Past Medical History:   Diagnosis Date     CAD (coronary artery disease) 6/29/05    anterior MI,  PTCA and stent placed in mid LAD     Cardiomyopathy (H)      Essential hypertension, benign 11/13/2002     Generalized osteoarthrosis, unspecified site 11/13/2002     Myocardial infarction      Neuropathy      Tobacco use disorder 11/13/2002     Type II or unspecified type diabetes mellitus without mention of complication, not stated as uncontrolled 7/14/2005     Current Outpatient Prescriptions   Medication Sig Dispense Refill     lisinopril (PRINIVIL/ZESTRIL) 20 MG tablet Take 1 tablet (20 mg) by mouth 2 times daily 180 tablet 3     order for [facility] [id number]  [place] [address]  Ph: [phone]  Fax: [phone]  Knee Roller   Length of Need: 6 months 1 Device 0     [facility] for [facility] [place] [facility] Phone [phone] Fax [phone]    Primary Dressing iodofoam   Qty 2  Secondary Dressing 4x4 gauze loaf Qty 1  Secondary Dressing 4\" sriram Qty 30  Secondary Dressing 2 medipore tape Qty 1    Length of Need: 1 month  Frequency of dressing change: daily 30 days 0     clopidogrel (PLAVIX) 75 MG tablet Take 1 tablet (75 mg) by mouth daily 90 tablet 2     apixaban ANTICOAGULANT (ELIQUIS) 5 MG tablet Take by mouth 2 times daily       amLODIPine (NORVASC) 5 MG tablet Take " 1 tablet (5 mg) by mouth daily 90 tablet 3     insulin glargine (LANTUS) 100 UNIT/ML injection Inject 40 Units Subcutaneous every 24 hours Daily at 1700        Cholecalciferol (D3 ADULT PO) Take 2,000 Units by mouth daily       isosorbide mononitrate (IMDUR) 30 MG 24 hr tablet Take 1 tablet (30 mg) by mouth daily 90 tablet 3     simvastatin (ZOCOR) 40 MG tablet Take 1 tablet (40 mg) by mouth At Bedtime 90 tablet 3     metoprolol (TOPROL-XL) 100 MG 24 hr tablet Take 1 tablet (100 mg) by mouth At Bedtime 90 tablet 3     Blood Pressure Monitor KIT Use to monitor blood pressure daily or as directed 1 kit 0     nitroglycerin (NITROSTAT) 0.4 MG sublingual tablet Place 1 tablet (0.4 mg) under the tongue every 5 minutes as needed for chest pain 50 tablet 0     pantoprazole (PROTONIX) 40 MG enteric coated tablet Take 1 tablet (40 mg) by mouth every morning (before breakfast) 30 tablet 0     metFORMIN (GLUCOPHAGE) 1000 MG tablet Take 1 tablet (1,000 mg) by mouth 2 times daily (with meals) 60 tablet 0     levothyroxine (SYNTHROID, LEVOTHROID) 137 MCG tablet Take 137 mcg by mouth At Bedtime  90 tablet 3     ONE TOUCH ULTRA TEST VI STRP use qid 120 11     ONE TOUCH LANCETS MISC use qid 120 4     aspirin 81 MG tablet Take by mouth daily       Social History   Substance Use Topics     Smoking status: Former Smoker     Packs/day: 1.00     Years: 25.00     Types: Cigarettes     Quit date: 7/25/2005     Smokeless tobacco: Never Used     Alcohol use Yes      Comment: occasional       ROS:  CONSTITUTIONAL:NEGATIVE for fever, chills, change in weight and POSITIVE  for fatigue and malaise  INTEGUMENTARY/SKIN: NEGATIVE for worrisome rashes, moles or lesions  EYES: POSITIVE for discharge, mattering and redness   ENT/MOUTH: NEGATIVE for ear, mouth and throat problems and POSITIVE for sore throat  RESP:POSITIVE for cough-non productive, cough-productive and SOB/dyspnea  CV: NEGATIVE for chest pain, palpitations or peripheral edema and  POSITIVE for Hx HTN  GI: NEGATIVE for nausea, abdominal pain, heartburn, or change in bowel habits and POSITIVE for Hx GERD    OBJECTIVE:  /78 (BP Location: Right arm, Patient Position: Chair, Cuff Size: Adult Regular)  Pulse 72  Temp 98.7  F (37.1  C) (Tympanic)  Resp 18  SpO2 96%  GENERAL APPEARANCE: healthy, alert and no distress  EYES: EOMI,  PERRL, conjunctiva redden right  HENT: ear canals and TM's normal.  Nose and mouth without ulcers, erythema or lesions.  No sinus tenderness.  Pharynx pink, no exudates  NECK: supple, nontender, no lymphadenopathy  RESP: lungs with few basilar rhonchi, no crackles or wheezes  CV: regular rates and rhythm, normal S1 S2, no murmur noted  Extremities: no peripheral edema or tenderness, peripheral pulses normal  NEURO: Normal strength and tone, sensory exam grossly normal,  normal speech and mentation  SKIN: no suspicious lesions or rashes  PSYCH: mentation appears normal and affect normal/bright    CXR - no acute infiltrate, mild peribronchial congestion, no pneumothorax, no pleural effusion     ASSESSMENT/PLAN:  (J20.9) Acute bronchitis with symptoms > 10 days  (primary encounter diagnosis)  Plan: amoxicillin-clavulanate (AUGMENTIN) 875-125 MG         per tablet, albuterol (PROAIR HFA/PROVENTIL         HFA/VENTOLIN HFA) 108 (90 BASE) MCG/ACT Inhaler            (R05) Cough  Comment: bronchitis  Plan: Bordetella per/paraper PCR, guaiFENesin-codeine        (ROBITUSSIN AC) 100-10 MG/5ML SOLN solution, XR        Chest 2 Views, benzonatate (TESSALON) 200 MG         capsule            (H10.33) Acute bacterial conjunctivitis of both eyes  Plan: tobramycin (TOBREX) 0.3 % ophthalmic solution            Reassurance given, reviewed symptomatic treatment, plenty of fluids and rest.  RX Augmentin given for bronchitis due to prolong symptoms and co-morbid medical problems.  RX hawa AC given for cough, best at bedtime.  RX tessalon perles and albuterol inhaler to help with cough.   Will screen for pertussis due to prolong symptoms.  Reviewed that Augmentin will cover for treatment for pertussis but that cough will be last for several months.    RX tobrex given for empiric treatment for presumed bacterial conjunctivitis.  Encourage good hand washing.    Return to clinic if no resolution of symptoms.    Emmanuel Joe MD  January 14, 2018 3:20 PM

## 2018-01-14 NOTE — PATIENT INSTRUCTIONS
Take full course of Augmenting for bronchitis treatment.  Okay to try tessalon perles and albuterol inhaler to help with cough.  Okay to continue with over the counter mucinex.  Can consider taking robitussin with codeine at bedtime to help with cough.    Use antibiotic eye drops until symptoms resolves, then use for 1 more day.  Do not exceed 7 days.      Bronchitis, Antibiotic Treatment (Adult)    Bronchitis is an infection of the air passages (bronchial tubes) in your lungs. It often occurs when you have a cold. This illness is contagious during the first few days and is spread through the air by coughing and sneezing, or by direct contact (touching the sick person and then touching your own eyes, nose, or mouth).  Symptoms of bronchitis include cough with mucus (phlegm) and low-grade fever. Bronchitis usually lasts 7 to 14 days. Mild cases can be treated with simple home remedies. More severe infection is treated with an antibiotic.  Home care  Follow these guidelines when caring for yourself at home:    If your symptoms are severe, rest at home for the first 2 to 3 days. When you go back to your usual activities, don't let yourself get too tired.    Do not smoke. Also avoid being exposed to secondhand smoke.    You may use over-the-counter medicines to control fever or pain, unless another medicine was prescribed. (Note: If you have chronic liver or kidney disease or have ever had a stomach ulcer or gastrointestinal bleeding, talk with your healthcare provider before using these medicines. Also talk to your provider if you are taking medicine to prevent blood clots.) Aspirin should never be given to anyone younger than 18 years of age who is ill with a viral infection or fever. It may cause severe liver or brain damage.    Your appetite may be poor, so a light diet is fine. Avoid dehydration by drinking 6 to 8 glasses of fluids per day (such as water, soft drinks, sports drinks, juices, tea, or soup). Extra  fluids will help loosen secretions in the nose and lungs.    Over-the-counter cough, cold, and sore-throat medicines will not shorten the length of the illness, but they may be helpful to reduce symptoms. (Note: Do not use decongestants if you have high blood pressure.)    Finish all antibiotic medicine. Do this even if you are feeling better after only a few days.  Follow-up care  Follow up with your healthcare provider, or as advised. If you had an X-ray or ECG (electrocardiogram), a specialist will review it. You will be notified of any new findings that may affect your care.  Note: If you are age 65 or older, or if you have a chronic lung disease or condition that affects your immune system, or you smoke, talk to your healthcare provider about having pneumococcal vaccinations and a yearly influenza vaccination (flu shot).  When to seek medical advice  Call your healthcare provider right away if any of these occur:    Fever of 100.4 F (38 C) or higher    Coughing up increased amounts of colored sputum    Weakness, drowsiness, headache, facial pain, ear pain, or a stiff neck  Call 911, or get immediate medical care  Contact emergency services right away if any of these occur.    Coughing up blood    Worsening weakness, drowsiness, headache, or stiff neck    Trouble breathing, wheezing, or pain with breathing  Date Last Reviewed: 9/13/2015 2000-2017 [employer]. [address] [place], [place] [id number]. All rights reserved. This information is not intended as a substitute for professional medical care. Always follow your healthcare professional's instructions.        Cough, Chronic, Uncertain Cause (Adult)    Everyone has had a cough as part of the common cold, flu, or bronchitis. This kind of cough occurs along with an achy feeling, low-grade fever, nasal and sinus congestion, and a scratchy or sore throat. This usually gets better in 2 to 3 weeks. A cough that lasts longer than 3 weeks may be  due to other causes.  If your cough does not improve over the next 2 weeks, further testing may be needed. Follow up with your healthcare provider as advised. Cough suppressants may be recommended. Based on your exam today, the exact cause of your cough is not certain. Below are some common causes for persistent cough.  Smokers cough  Smoker s cough doesn t go away. If you continue to smoke, it only gets worse. The cough is from irritation in the air passages. Talk to your healthcare provider about quitting. Medicines or nicotine-replacement products, like gum or the patch, may make quitting easier.  Postnasal drip  A cough that is worse at night may be due to postnasal drip. Excess mucus in the nose drains from the back of your nose to your throat. This triggers the cough reflex. Postnasal drip may be due to a sinus infection or allergy. Common allergens include dust, tobacco smoke (both inhaled and secondhand smoke), environmental pollutants, pollen, mold, pets, cleaning agents, room deodorizers, and chemical fumes. Over-the-counter antihistamines or decongestants may be helpful for allergies. A sinus infection may requires antibiotic treatment. See your healthcare provider if symptoms continue.  Medicines  Certain prescribed medicines can cause a chronic cough in some people:    ACE inhibitors for high blood pressure. These include benazepril, captopril, enalapril, fosinopril, lisinopril, quinapril, ramipril, and others.    Beta-blockers for high blood pressure and other conditions. These include propranolol, atenolol, metoprolol, nadolol, and others.  Let your healthcare provider know if you are taking any of these.  Asthma  Cough may be the only sign of mild asthma. You may have tests to find out if asthma is causing your cough. You may also take asthma medicine on a trial basis.  Acid reflux (heartburn, GERD)  The esophagus is the tube that carries food from the mouth to the stomach. A valve at its lower end  prevents stomach acids from flowing upward. If this valve does not work properly, acid from the stomach enters the esophagus. This may cause a burning pain in the upper abdomen or lower chest, belching, or cough. Symptoms are often worse when lying flat. Avoid eating or drinking before bedtime. Try using extra pillows to raise your upper body, or place 4-inch blocks under the head of your bed. You may try an over-the-counter antacid or an acid-blocking medicine such as famotidine, cimetidine, ranitidine, esomeprazole, lansoprazole, or omeprazole. Stronger medicines for this condition can be prescribed by your healthcare provider.  Follow-up care  Follow up with your healthcare provider, or as advised, if your cough does not improve. Further testing may be needed.  Note: If an X-ray was taken, a specialist will review it. You will be notified of any new findings that may affect your care.  When to seek medical advice  Call your healthcare provider right away if any of these occur:    Mild wheezing or difficulty breathing    Fever of 100.4 F (38 C) or higher, or as directed by your healthcare provider    Unexpected weight loss    Coughing up large amounts of colored sputum    Night sweats (sheets and pajamas get soaking wet)  Call 911, or get immediate medical care  Contact emergency services right away if any of these occur:    Coughing up blood    Moderate to severe trouble breathing or wheezing  Date Last Reviewed: 9/13/2015 2000-2017 [employer]. [address] [place] [address]. All rights reserved. This information is not intended as a substitute for professional medical care. Always follow your healthcare professional's instructions.        Whooping Cough (Pertussis) (Adult)  Whooping cough (pertussis) is a bacterial infection in the respiratory tract. It can be a very serious infection in infants and older adults. In healthy older children and adults, it is generally mild.  Pertussis  "is highly contagious. The infection is spread through the air by coughing or sneezing. The illness starts like an ordinary cold with mild cough, congestion, and low fever. Symptoms then develop that may include:    Coughing spells that cause a \"whooping\" sound when breathing in    Gagging or vomiting after coughing    Poor appetite    Feeling very tired    Thick mucus in the nose and throat  Antibiotics are used to treat this illness. Even with treatment, it may take up to 3 months for the cough to go away completely.  Vaccination prevents pertussis in children. The vaccine effect lessens after 5 to 10 years. So a booster vaccine is often needed. Teens and adults who were vaccinated as children and have not had a booster can be infected and may spread the infection to unvaccinated infants and children. Be sure to ask your healthcare provider whether anyone in your household needs a booster vaccination.  Home Care    Take all medicine as prescribed by the healthcare provider. Be sure to take antibiotics as directed until they are gone, even if you feel better. If you do not finish the antibiotics, the infection may come back and be harder to treat.    Rest and get plenty of sleep.    Stay home from work or school until you have completed at least 5 days of antibiotic treatment. If antibiotics are not used, stay home until 21 days after you first had symptoms of a cough. When resuming activity, go back to your normal routine gradually.    Avoid exposure to cigarette smoke.    Ask your healthcare provider before taking over-the-counter medicine for fever, pain, and coughing.    To avoid loss of fluids (dehydration), try drinking 6-8 glasses of fluids (water, juice, tea, soup) a day. Fluids will help loosen secretions in the nose and lungs.    Cover your mouth and nose when you sneeze or cough. Dispose of any tissues properly.    Wash your hands well with soap and water after you cough or sneeze, and frequently " throughout the day.  Follow-up care  Follow up with your healthcare provider as advised.  When to seek medical advice  Call your healthcare provider right away for any of the following:    Trouble breathing or painful breathing    Cough with repeated gagging or vomiting    Coughing up colored or bloody mucus    Severe headache or face, neck, or ear pain    Fever over 100.4 F (38.0 C) for more than 3 days    You don't start improving within 1 week  Date Last Reviewed: 9/25/2015 2000-2017 [employer]. [address] [place] [id number]. All rights reserved. This information is not intended as a substitute for professional medical care. Always follow your healthcare professional's instructions.

## 2018-01-14 NOTE — NURSING NOTE
"Chief Complaint   Patient presents with     Urgent Care     has had a cold for over 1 month - now having headache, eye problems and coughing is keeping up at night - has tried to use cough meds, mucinex pills seemed to work the beset but then they don't last as long       Initial /78 (BP Location: Right arm, Patient Position: Chair, Cuff Size: Adult Regular)  Pulse 72  Temp 98.7  F (37.1  C) (Tympanic)  Resp 18  SpO2 96% Estimated body mass index is 30.31 kg/(m^2) as calculated from the following:    Height as of 10/25/17: 6' 4\" (1.93 m).    Weight as of 10/25/17: 249 lb (112.9 kg).  Medication Reconciliation: complete  Penny Mathew CMA  "

## 2018-01-14 NOTE — MR AVS SNAPSHOT
After Visit Summary   1/14/2018    Jayro Elder    MRN: [id number]           Patient Information     Date Of Birth          1950        Visit Information        Provider Department      1/14/2018 12:35 PM Emmanuel Joe MD [facility]        Today's Diagnoses     Acute bronchitis with symptoms > 10 days    -  1    Cough        Acute bacterial conjunctivitis of both eyes          Care Instructions    Take full course of Augmenting for bronchitis treatment.  Okay to try tessalon perles and albuterol inhaler to help with cough.  Okay to continue with over the counter mucinex.  Can consider taking robitussin with codeine at bedtime to help with cough.    Use antibiotic eye drops until symptoms resolves, then use for 1 more day.  Do not exceed 7 days.      Bronchitis, Antibiotic Treatment (Adult)    Bronchitis is an infection of the air passages (bronchial tubes) in your lungs. It often occurs when you have a cold. This illness is contagious during the first few days and is spread through the air by coughing and sneezing, or by direct contact (touching the sick person and then touching your own eyes, nose, or mouth).  Symptoms of bronchitis include cough with mucus (phlegm) and low-grade fever. Bronchitis usually lasts 7 to 14 days. Mild cases can be treated with simple home remedies. More severe infection is treated with an antibiotic.  Home care  Follow these guidelines when caring for yourself at home:    If your symptoms are severe, rest at home for the first 2 to 3 days. When you go back to your usual activities, don't let yourself get too tired.    Do not smoke. Also avoid being exposed to secondhand smoke.    You may use over-the-counter medicines to control fever or pain, unless another medicine was prescribed. (Note: If you have chronic liver or kidney disease or have ever had a stomach ulcer or gastrointestinal bleeding, talk with your healthcare provider before using these  medicines. Also talk to your provider if you are taking medicine to prevent blood clots.) Aspirin should never be given to anyone younger than 18 years of age who is ill with a viral infection or fever. It may cause severe liver or brain damage.    Your appetite may be poor, so a light diet is fine. Avoid dehydration by drinking 6 to 8 glasses of fluids per day (such as water, soft drinks, sports drinks, juices, tea, or soup). Extra fluids will help loosen secretions in the nose and lungs.    Over-the-counter cough, cold, and sore-throat medicines will not shorten the length of the illness, but they may be helpful to reduce symptoms. (Note: Do not use decongestants if you have high blood pressure.)    Finish all antibiotic medicine. Do this even if you are feeling better after only a few days.  Follow-up care  Follow up with your healthcare provider, or as advised. If you had an X-ray or ECG (electrocardiogram), a specialist will review it. You will be notified of any new findings that may affect your care.  Note: If you are age 65 or older, or if you have a chronic lung disease or condition that affects your immune system, or you smoke, talk to your healthcare provider about having pneumococcal vaccinations and a yearly influenza vaccination (flu shot).  When to seek medical advice  Call your healthcare provider right away if any of these occur:    Fever of 100.4 F (38 C) or higher    Coughing up increased amounts of colored sputum    Weakness, drowsiness, headache, facial pain, ear pain, or a stiff neck  Call 911, or get immediate medical care  Contact emergency services right away if any of these occur.    Coughing up blood    Worsening weakness, drowsiness, headache, or stiff neck    Trouble breathing, wheezing, or pain with breathing  Date Last Reviewed: 9/13/2015 2000-2017 [employer]. [address] [facility], [place] [address]. All rights reserved. This information is not intended as a  substitute for professional medical care. Always follow your healthcare professional's instructions.        Cough, Chronic, Uncertain Cause (Adult)    Everyone has had a cough as part of the common cold, flu, or bronchitis. This kind of cough occurs along with an achy feeling, low-grade fever, nasal and sinus congestion, and a scratchy or sore throat. This usually gets better in 2 to 3 weeks. A cough that lasts longer than 3 weeks may be due to other causes.  If your cough does not improve over the next 2 weeks, further testing may be needed. Follow up with your healthcare provider as advised. Cough suppressants may be recommended. Based on your exam today, the exact cause of your cough is not certain. Below are some common causes for persistent cough.  Smokers cough  Smoker s cough doesn t go away. If you continue to smoke, it only gets worse. The cough is from irritation in the air passages. Talk to your healthcare provider about quitting. Medicines or nicotine-replacement products, like gum or the patch, may make quitting easier.  Postnasal drip  A cough that is worse at night may be due to postnasal drip. Excess mucus in the nose drains from the back of your nose to your throat. This triggers the cough reflex. Postnasal drip may be due to a sinus infection or allergy. Common allergens include dust, tobacco smoke (both inhaled and secondhand smoke), environmental pollutants, pollen, mold, pets, cleaning agents, room deodorizers, and chemical fumes. Over-the-counter antihistamines or decongestants may be helpful for allergies. A sinus infection may requires antibiotic treatment. See your healthcare provider if symptoms continue.  Medicines  Certain prescribed medicines can cause a chronic cough in some people:    ACE inhibitors for high blood pressure. These include benazepril, captopril, enalapril, fosinopril, lisinopril, quinapril, ramipril, and others.    Beta-blockers for high blood pressure and other  conditions. These include propranolol, atenolol, metoprolol, nadolol, and others.  Let your healthcare provider know if you are taking any of these.  Asthma  Cough may be the only sign of mild asthma. You may have tests to find out if asthma is causing your cough. You may also take asthma medicine on a trial basis.  Acid reflux (heartburn, GERD)  The esophagus is the tube that carries food from the mouth to the stomach. A valve at its lower end prevents stomach acids from flowing upward. If this valve does not work properly, acid from the stomach enters the esophagus. This may cause a burning pain in the upper abdomen or lower chest, belching, or cough. Symptoms are often worse when lying flat. Avoid eating or drinking before bedtime. Try using extra pillows to raise your upper body, or place 4-inch blocks under the head of your bed. You may try an over-the-counter antacid or an acid-blocking medicine such as famotidine, cimetidine, ranitidine, esomeprazole, lansoprazole, or omeprazole. Stronger medicines for this condition can be prescribed by your healthcare provider.  Follow-up care  Follow up with your healthcare provider, or as advised, if your cough does not improve. Further testing may be needed.  Note: If an X-ray was taken, a specialist will review it. You will be notified of any new findings that may affect your care.  When to seek medical advice  Call your healthcare provider right away if any of these occur:    Mild wheezing or difficulty breathing    Fever of 100.4 F (38 C) or higher, or as directed by your healthcare provider    Unexpected weight loss    Coughing up large amounts of colored sputum    Night sweats (sheets and pajamas get soaking wet)  Call 911, or get immediate medical care  Contact emergency services right away if any of these occur:    Coughing up blood    Moderate to severe trouble breathing or wheezing  Date Last Reviewed: 9/13/2015 2000-2017 [employer] [address]  "[place] [address]. All rights reserved. This information is not intended as a substitute for professional medical care. Always follow your healthcare professional's instructions.        Whooping Cough (Pertussis) (Adult)  Whooping cough (pertussis) is a bacterial infection in the respiratory tract. It can be a very serious infection in infants and older adults. In healthy older children and adults, it is generally mild.  Pertussis is highly contagious. The infection is spread through the air by coughing or sneezing. The illness starts like an ordinary cold with mild cough, congestion, and low fever. Symptoms then develop that may include:    Coughing spells that cause a \"whooping\" sound when breathing in    Gagging or vomiting after coughing    Poor appetite    Feeling very tired    Thick mucus in the nose and throat  Antibiotics are used to treat this illness. Even with treatment, it may take up to 3 months for the cough to go away completely.  Vaccination prevents pertussis in children. The vaccine effect lessens after 5 to 10 years. So a booster vaccine is often needed. Teens and adults who were vaccinated as children and have not had a booster can be infected and may spread the infection to unvaccinated infants and children. Be sure to ask your healthcare provider whether anyone in your household needs a booster vaccination.  Home Care    Take all medicine as prescribed by the healthcare provider. Be sure to take antibiotics as directed until they are gone, even if you feel better. If you do not finish the antibiotics, the infection may come back and be harder to treat.    Rest and get plenty of sleep.    Stay home from work or school until you have completed at least 5 days of antibiotic treatment. If antibiotics are not used, stay home until 21 days after you first had symptoms of a cough. When resuming activity, go back to your normal routine gradually.    Avoid exposure to cigarette " smoke.    Ask your healthcare provider before taking over-the-counter medicine for fever, pain, and coughing.    To avoid loss of fluids (dehydration), try drinking 6-8 glasses of fluids (water, juice, tea, soup) a day. Fluids will help loosen secretions in the nose and lungs.    Cover your mouth and nose when you sneeze or cough. Dispose of any tissues properly.    Wash your hands well with soap and water after you cough or sneeze, and frequently throughout the day.  Follow-up care  Follow up with your healthcare provider as advised.  When to seek medical advice  Call your healthcare provider right away for any of the following:    Trouble breathing or painful breathing    Cough with repeated gagging or vomiting    Coughing up colored or bloody mucus    Severe headache or face, neck, or ear pain    Fever over 100.4 F (38.0 C) for more than 3 days    You don't start improving within 1 week  Date Last Reviewed: 9/25/2015 2000-2017 [employer]. [address] [place] [address]. All rights reserved. This information is not intended as a substitute for professional medical care. Always follow your healthcare professional's instructions.                Follow-ups after your visit        Who to contact     If you have questions or need follow up information about today's clinic visit or your schedule please contact [facility] directly at [phone].  Normal or non-critical lab and imaging results will be communicated to you by MyChart, letter or phone within 4 business days after the clinic has received the results. If you do not hear from us within 7 days, please contact the clinic through MyChart or phone. If you have a critical or abnormal lab result, we will notify you by phone as soon as possible.  Submit refill requests through [employer] or call your pharmacy and they will forward the refill request to us. Please allow 3 business days for your refill to be completed.        "   Additional Information About Your Visit        MyChart Information     [employer] lets you send messages to your doctor, view your test results, renew your prescriptions, schedule appointments and more. To sign up, go to [email or web address] . Click on \"Log in\" on the left side of the screen, which will take you to the Welcome page. Then click on \"Sign up Now\" on the right side of the page.     You will be asked to enter the access code listed below, as well as some personal information. Please follow the directions to create your username and password.     Your access code is: [email or web address]  Expires: 2018  4:01 PM     Your access code will  in 90 days. If you need help or a new code, please call your [facility] clinic or [phone].        Care EveryWhere ID     This is your Care EveryWhere ID. This could be used by other organizations to access your [facility] medical records  [id number]        Your Vitals Were     Pulse Temperature Respirations Pulse Oximetry          72 98.7  F (37.1  C) (Tympanic) 18 96%         Blood Pressure from Last 3 Encounters:   18 132/78   10/25/17 130/72   10/19/17 138/88    Weight from Last 3 Encounters:   10/25/17 249 lb (112.9 kg)   10/19/17 244 lb (110.7 kg)   17 235 lb (106.6 kg)              We Performed the Following     Bordetella per/paraper PCR          Today's Medication Changes          These changes are accurate as of: 18  3:00 PM.  If you have any questions, ask your nurse or doctor.               Start taking these medicines.        Dose/Directions    albuterol 108 (90 BASE) MCG/ACT Inhaler   Commonly known as:  PROAIR HFA/PROVENTIL HFA/VENTOLIN HFA   Used for:  Acute bronchitis with symptoms > 10 days   Started by:  Emmanuel Joe MD        Dose:  2 puff   Inhale 2 puffs into the lungs every 6 hours as needed for shortness of breath / dyspnea or wheezing   Quantity:  1 Inhaler   Refills:  0       amoxicillin-clavulanate 875-125 MG per tablet "   Commonly known as:  AUGMENTIN   Used for:  Acute bronchitis with symptoms > 10 days   Started by:  Emmanuel Joe MD        Dose:  1 tablet   Take 1 tablet by mouth 2 times daily   Quantity:  20 tablet   Refills:  0       benzonatate 200 MG capsule   Commonly known as:  TESSALON   Used for:  Cough   Started by:  Emmanuel Joe MD        Dose:  200 mg   Take 1 capsule (200 mg) by mouth 3 times daily as needed for cough   Quantity:  21 capsule   Refills:  0       guaiFENesin-codeine 100-10 MG/5ML Soln solution   Commonly known as:  ROBITUSSIN AC   Used for:  Cough   Started by:  Emmanuel Joe MD        Dose:  2 tsp.   Take 10 mLs by mouth every 4 hours as needed   Quantity:  120 mL   Refills:  0       tobramycin 0.3 % ophthalmic solution   Commonly known as:  TOBREX   Used for:  Acute bacterial conjunctivitis of both eyes   Started by:  Emmanuel Joe MD        Dose:  1 drop   Place 1 drop into both eyes 3 times daily for 7 days   Quantity:  5 mL   Refills:  0            Where to get your medicines      These medications were sent to [facility]/pharmacy #[id number] - [place], [place] - [address] [facility]  [address] [facility] [address]     Phone:  [phone]     albuterol 108 (90 BASE) MCG/ACT Inhaler    amoxicillin-clavulanate 875-125 MG per tablet    benzonatate 200 MG capsule    tobramycin 0.3 % ophthalmic solution         Some of these will need a paper prescription and others can be bought over the counter.  Ask your nurse if you have questions.     Bring a paper prescription for each of these medications     guaiFENesin-codeine 100-10 MG/5ML Soln solution                Primary Care Provider Office Phone # Fax #    Davion Cordova PA-C [phone] [address]  [facility] [address]        Equal Access to Services     LATHA OLIVEROS : Alan Miller, wanolanda luqbrianna, qaybta kaalmada verona, rivera gonzalez. So [facility] [phone].    ATENCIÓN: Edel sam  español, tiene a renteria disposición servicios gratuitos de asistencia lingüística. Bucky wade [phone].    We comply with applicable federal civil rights laws and [place] laws. We do not discriminate on the basis of race, color, national origin, age, disability, sex, sexual orientation, or gender identity.            Thank you!     Thank you for choosing [facility]  for your care. Our goal is always to provide you with excellent care. Hearing back from our patients is one way we can continue to improve our services. Please take a few minutes to complete the written survey that you may receive in the mail after your visit with us. Thank you!             Your Updated Medication List - Protect others around you: Learn how to safely use, store and throw away your medicines at www.disposemymeds.org.          This list is accurate as of: 1/14/18  3:00 PM.  Always use your most recent med list.                   Brand Name Dispense Instructions for use Diagnosis    albuterol 108 (90 BASE) MCG/ACT Inhaler    PROAIR HFA/PROVENTIL HFA/VENTOLIN HFA    1 Inhaler    Inhale 2 puffs into the lungs every 6 hours as needed for shortness of breath / dyspnea or wheezing    Acute bronchitis with symptoms > 10 days       amLODIPine 5 MG tablet    NORVASC    90 tablet    Take 1 tablet (5 mg) by mouth daily    Coronary artery disease involving native coronary artery of native heart with angina pectoris (H)       amoxicillin-clavulanate 875-125 MG per tablet    AUGMENTIN    20 tablet    Take 1 tablet by mouth 2 times daily    Acute bronchitis with symptoms > 10 days       aspirin 81 MG tablet      Take by mouth daily        benzonatate 200 MG capsule    TESSALON    21 capsule    Take 1 capsule (200 mg) by mouth 3 times daily as needed for cough    Cough       Blood Pressure Monitor Kit     1 kit    Use to monitor blood pressure daily or as directed    Hypertension goal BP (blood pressure) < 140/90       clopidogrel 75 MG  tablet    PLAVIX    90 tablet    Take 1 tablet (75 mg) by mouth daily    Coronary artery disease involving native coronary artery of native heart with other form of angina pectoris (H)       D3 ADULT PO      Take 2,000 Units by mouth daily        ELIQUIS 5 MG tablet   Generic drug:  apixaban ANTICOAGULANT      Take by mouth 2 times daily        guaiFENesin-codeine 100-10 MG/5ML Soln solution    ROBITUSSIN AC    120 mL    Take 10 mLs by mouth every 4 hours as needed    Cough       insulin glargine 100 UNIT/ML injection    LANTUS     Inject 40 Units Subcutaneous every 24 hours Daily at 1700        isosorbide mononitrate 30 MG 24 hr tablet    IMDUR    90 tablet    Take 1 tablet (30 mg) by mouth daily    Hypertension goal BP (blood pressure) < 140/90       levothyroxine 137 MCG tablet    SYNTHROID/LEVOTHROID    90 tablet    Take 137 mcg by mouth At Bedtime        lisinopril 20 MG tablet    PRINIVIL/ZESTRIL    180 tablet    Take 1 tablet (20 mg) by mouth 2 times daily    Benign essential hypertension       metFORMIN 1000 MG tablet    GLUCOPHAGE    60 tablet    Take 1 tablet (1,000 mg) by mouth 2 times daily (with meals)    Type II or unspecified type diabetes mellitus without mention of complication, not stated as uncontrolled       metoprolol succinate 100 MG 24 hr tablet    TOPROL-XL    90 tablet    Take 1 tablet (100 mg) by mouth At Bedtime    Coronary artery disease involving native coronary artery of native heart without angina pectoris       nitroGLYcerin 0.4 MG sublingual tablet    NITROSTAT    50 tablet    Place 1 tablet (0.4 mg) under the tongue every 5 minutes as needed for chest pain    Other chest pain       ONETOUCH LANCETS Misc     120    use qid        ONETOUCH ULTRA test strip   Generic drug:  blood glucose monitoring     120    use qid    Type II or unspecified type diabetes mellitus without mention of complication, not stated as uncontrolled       * order for DME     30 days    [facility] Phone  "[phone] Fax [phone]  Primary Dressing iodofoam   Qty 2 Secondary Dressing 4x4 gauze loaf Qty 1 Secondary Dressing 4\" sriram Qty 30 Secondary Dressing 2 medipore tape Qty 1  Length of Need: 1 month Frequency of dressing change: daily    Diabetic ulcer of left midfoot associated with type 2 diabetes mellitus, with fat layer exposed (H)       * order for DME     1 Device    [id number] [place] [address] Ph: [phone] Fax: [phone] Knee Roller  Length of Need: 6 months    Diabetic ulcer of left midfoot associated with type 2 diabetes mellitus, with fat layer exposed (H)       pantoprazole 40 MG EC tablet    PROTONIX    30 tablet    Take 1 tablet (40 mg) by mouth every morning (before breakfast)    GERD (gastroesophageal reflux disease)       simvastatin 40 MG tablet    ZOCOR    90 tablet    Take 1 tablet (40 mg) by mouth At Bedtime    Coronary artery disease involving native coronary artery of native heart without angina pectoris       tobramycin 0.3 % ophthalmic solution    TOBREX    5 mL    Place 1 drop into both eyes 3 times daily for 7 days    Acute bacterial conjunctivitis of both eyes       * Notice:  This list has 2 medication(s) that are the same as other medications prescribed for you. Read the directions carefully, and ask your doctor or other care provider to review them with you.      "

## 2018-01-16 LAB
B PERT+PARAPERT DNA PNL SPEC NAA+PROBE: NEGATIVE
SPECIMEN SOURCE: NORMAL

## 2018-02-12 ENCOUNTER — HOSPITAL ENCOUNTER (OUTPATIENT)
Dept: WOUND CARE | Facility: CLINIC | Age: 68
Discharge: HOME | End: 2018-02-12
Attending: PHYSICIAN ASSISTANT | Admitting: PHYSICIAN ASSISTANT
Payer: MEDICARE

## 2018-02-12 PROCEDURE — 11056 PARNG/CUTG B9 HYPRKR LES 2-4: CPT | Performed by: PHYSICIAN ASSISTANT

## 2018-02-12 PROCEDURE — 11056 PARNG/CUTG B9 HYPRKR LES 2-4: CPT

## 2018-02-12 NOTE — MR AVS SNAPSHOT
"                  MRN:[id number]                      After Visit Summary   2018    Jayro Elder    MRN: [id number]           Visit Information        Provider Department      2018  2:00 PM Layla Ambriz PA-C [facility]          Further instructions from your care team             [facility]  [address] [facility] [address], [place] [address]  Appointment Phone [phone] Nurse Advisors [phone]    Jayro Elder      1950    40% Salicylic Acid Callus/Corn Remover apply to left mid foot and right 3rd toe, cover with gauze and apply daily    Call us at [phone] if you have any questions about your wounds, have redness or swelling around your wound, have a fever of 101 or greater or if you have any other problems or concerns. We answer the phone Monday through Friday 8 am to 4 pm, please leave a message as we check the voicemail frequently throughout the day.     Follow up with Provider - Dermatology    Follow Up with Meredith heredia in 2 weeks.            MyChart Information     [employer] lets you send messages to your doctor, view your test results, renew your prescriptions, schedule appointments and more. To sign up, go to [email or web address] . Click on \"Log in\" on the left side of the screen, which will take you to the Welcome page. Then click on \"Sign up Now\" on the right side of the page.     You will be asked to enter the access code listed below, as well as some personal information. Please follow the directions to create your username and password.     Your access code is: [email or web address]  Expires: 2018  2:41 PM     Your access code will  in 90 days. If you need help or a new code, please call your [facility] clinic or [phone].        Care EveryWhere ID     This is your Care EveryWhere ID. This could be used by other organizations to access your [facility] medical records  [id number]      "   Equal Access to Services     DANK OLIVEROS : Alan Miller, wamichelle gardner, qarivera bourgeois. So [facility] [phone].    ATENCIÓN: Si habla español, tiene a renteria disposición servicios gratuitos de asistencia lingüística. Llame al [phone].    We comply with applicable federal civil rights laws and [place] laws. We do not discriminate on the basis of race, color, national origin, age, disability, sex, sexual orientation, or gender identity.

## 2018-02-12 NOTE — PROGRESS NOTES
[facility]     HISTORY OF PRESENT ILLNESS: Mr. Jayro Elder is a 67-year-old gentleman who returns to our clinic for very chronic, bilateral, foot ulcers. He was previously cared for by podiatry who was regularly debriding his wound and recommended he apply NeoSporin and a bandaid. Jayro did not feel that he made any progress with this method. He has now been seeing Dr. Glez who performed a biopsy that was consistent with verruca. He has since been undergoing pulsed dye laser treatments. However, he reports that Dr. Glez does not perform any kind of debridement on these lesions and Jayro is bothered by the pain that the lesions cause him.      Jayro has had imaging that was negative for bone infection. Also has had recent ABIs that were within normal limits.      OFFLOADING: Doesn't utilize any offloading currently. Owns a DH2 shoe. Tried to get custom insoles and reported that they were not covered by his insurance.      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     PHYSICAL EXAM:  GENERAL: Patient is alert and oriented and in no acute distress  INTEGUMENTARY: hyperkeratotic, verrucous lesions on right third toe and left plantar met head     PROCEDURE: Per standing orders, topical 4% lidocaine was applied to the wound by the [facility]. Timeout was called and informed consent was obtained. Using a 10 blade and a tissue nipper, the hyperkeratotic lesions were excised down to bleeding tissue.      ASSESSMENT: bilateral plantar verruca in a patient with diabetes and neuropathy     PLAN:   1. Recommend 40% salicylic acid nightly  2. Continue PDL with   Amaris as he needs treatment to target the wart in order to resolve this issue  3. We can debride him again to facilitate the efficacy of the PDL treatments but our treatment will not be curative     FOLLOW-UP: 2 weeks     ROXI SEAMAN PA-C

## 2018-02-12 NOTE — DISCHARGE INSTRUCTIONS
[facility]  [address] [facility] [address], [place] [address]  Appointment Phone [phone] Nurse Advisors [phone]    Jayro Elder      1950    40% Salicylic Acid Callus/Corn Remover apply to left mid foot and right 3rd toe, cover with gauze and apply daily    Call us at [phone] if you have any questions about your wounds, have redness or swelling around your wound, have a fever of 101 or greater or if you have any other problems or concerns. We answer the phone Monday through Friday 8 am to 4 pm, please leave a message as we check the voicemail frequently throughout the day.     Follow up with Provider - Dermatology    Follow Up with Meredith heredia in 2 weeks.

## 2018-03-01 ENCOUNTER — HOSPITAL ENCOUNTER (OUTPATIENT)
Dept: WOUND CARE | Facility: CLINIC | Age: 68
Discharge: HOME | End: 2018-03-01
Attending: PHYSICIAN ASSISTANT | Admitting: PHYSICIAN ASSISTANT
Payer: MEDICARE

## 2018-03-01 VITALS — HEART RATE: 82 BPM | DIASTOLIC BLOOD PRESSURE: 82 MMHG | SYSTOLIC BLOOD PRESSURE: 136 MMHG | TEMPERATURE: 96.6 F

## 2018-03-01 PROCEDURE — 11056 PARNG/CUTG B9 HYPRKR LES 2-4: CPT

## 2018-03-01 PROCEDURE — 99213 OFFICE O/P EST LOW 20 MIN: CPT | Performed by: PHYSICIAN ASSISTANT

## 2018-03-01 NOTE — MR AVS SNAPSHOT
"                  MRN:[id number]                      After Visit Summary   3/1/2018    Jayro Elder    MRN: [id number]           Visit Information        Provider Department      3/1/2018 10:00 AM Layla Ambriz PA-C [facility] [place]          Further instructions from your care team             [facility] [place]  [address] [facility] [address], [facility] [address]  Appointment Phone [phone] Nurse Advisors [phone]    Jayro Elder      1950  Please use soap and water to clean your feet in the shower, than use a pumice stone daily to keep callus from returning. Apply UREA Cream on both feet daily.   Follow up with Foot Care Nurses below for callus trimming and toe nail trimming     Layla Costa PA-C. March 1, 2018    Call us at [phone] if you have any questions about your wounds, have redness or swelling around your wound, have a fever of 101 or greater or if you have any other problems or concerns. We answer the phone Monday through Friday 8 am to 4 pm, please leave a message as we check the voicemail frequently throughout the day.     Follow up with Provider - Dermatologist and Primary Care Doctor.       FOOT CARE NURSES  If you are interested in having a foot care nurse come out to your   home, please call one of these contacts for more information:  [facility]  [phone] [facility]  [phone]   [facility]  [phone]  [facility] [phone]  [place]   [facility]  [phone]  At [facility] [phone]               MyChart Information     [employer] lets you send messages to your doctor, view your test results, renew your prescriptions, schedule appointments and more. To sign up, go to [email or web address] . Click on \"Log in\" on the left side of the screen, which will take you to the Welcome page. Then click on \"Sign up " "Now\" on the right side of the page.     You will be asked to enter the access code listed below, as well as some personal information. Please follow the directions to create your username and password.     Your access code is: [email or web address]  Expires: 2018  2:41 PM     Your access code will  in 90 days. If you need help or a new code, please call your [facility] clinic or [phone].        Care EveryWhere ID     This is your Care EveryWhere ID. This could be used by other organizations to access your [facility] medical records  [id number]        Equal Access to Services     DANK OLIVEROS : Alan Miller, raymond gardner, arpit rhoades, rivera gonzalez. So [facility] [phone].    ATENCIÓN: Si habla español, tiene a renteria disposición servicios gratuitos de asistencia lingüística. Llame al [phone].    We comply with applicable federal civil rights laws and [place] laws. We do not discriminate on the basis of race, color, national origin, age, disability, sex, sexual orientation, or gender identity.            "

## 2018-03-01 NOTE — DISCHARGE INSTRUCTIONS
[facility]  [address] [facility] [address], [facility] [address]  Appointment Phone [phone] Nurse Advisors [phone]    Jayro Elder      1950  Please use soap and water to clean your feet in the shower, than use a pumice stone daily to keep callus from returning. Apply UREA Cream on both feet daily.   Follow up with Foot Care Nurses below for callus trimming and toe nail trimming     Layla Costa PA-C. March 1, 2018    Call us at [phone] if you have any questions about your wounds, have redness or swelling around your wound, have a fever of 101 or greater or if you have any other problems or concerns. We answer the phone Monday through Friday 8 am to 4 pm, please leave a message as we check the voicemail frequently throughout the day.     Follow up with Provider - Dermatologist and Primary Care Doctor.       FOOT CARE NURSES  If you are interested in having a foot care nurse come out to your   home, please call one of these contacts for more information:  [facility]  [phone] [facility]  [phone]   [facility]  [phone]  [facility] [phone]  [place]   [facility]  [phone]  At [facility] Clinic [phone]

## 2018-03-03 ENCOUNTER — APPOINTMENT (OUTPATIENT)
Dept: CT IMAGING | Facility: CLINIC | Age: 68
DRG: 872 | End: 2018-03-03
Attending: EMERGENCY MEDICINE
Payer: MEDICARE

## 2018-03-03 ENCOUNTER — APPOINTMENT (OUTPATIENT)
Dept: ULTRASOUND IMAGING | Facility: CLINIC | Age: 68
DRG: 872 | End: 2018-03-03
Attending: EMERGENCY MEDICINE
Payer: MEDICARE

## 2018-03-03 ENCOUNTER — HOSPITAL ENCOUNTER (INPATIENT)
Facility: CLINIC | Age: 68
LOS: 4 days | Discharge: HOME | DRG: 872 | End: 2018-03-07
Attending: EMERGENCY MEDICINE | Admitting: INTERNAL MEDICINE
Payer: MEDICARE

## 2018-03-03 DIAGNOSIS — R55 SYNCOPE, UNSPECIFIED SYNCOPE TYPE: ICD-10-CM

## 2018-03-03 DIAGNOSIS — L03.116 LEFT LEG CELLULITIS: ICD-10-CM

## 2018-03-03 PROBLEM — L03.90 CELLULITIS: Status: ACTIVE | Noted: 2018-03-03

## 2018-03-03 LAB
ANION GAP SERPL CALCULATED.3IONS-SCNC: 8 MMOL/L (ref 3–14)
BUN SERPL-MCNC: 18 MG/DL (ref 7–30)
CALCIUM SERPL-MCNC: 9.1 MG/DL (ref 8.5–10.1)
CHLORIDE SERPL-SCNC: 97 MMOL/L (ref 94–109)
CK SERPL-CCNC: 56 U/L (ref 30–300)
CO2 BLDCOV-SCNC: 29 MMOL/L (ref 21–28)
CO2 SERPL-SCNC: 26 MMOL/L (ref 20–32)
CREAT SERPL-MCNC: 0.92 MG/DL (ref 0.66–1.25)
ERYTHROCYTE [DISTWIDTH] IN BLOOD BY AUTOMATED COUNT: 13 % (ref 10–15)
GFR SERPL CREATININE-BSD FRML MDRD: 82 ML/MIN/1.7M2
GLUCOSE BLDC GLUCOMTR-MCNC: 329 MG/DL (ref 70–99)
GLUCOSE SERPL-MCNC: 256 MG/DL (ref 70–99)
HCT VFR BLD AUTO: 40.3 % (ref 40–53)
HGB BLD-MCNC: 13.8 G/DL (ref 13.3–17.7)
LACTATE BLD-SCNC: 1.7 MMOL/L (ref 0.7–2.1)
MCH RBC QN AUTO: 28.7 PG (ref 26.5–33)
MCHC RBC AUTO-ENTMCNC: 34.2 G/DL (ref 31.5–36.5)
MCV RBC AUTO: 84 FL (ref 78–100)
NT-PROBNP SERPL-MCNC: 4086 PG/ML (ref 0–900)
PCO2 BLDV: 42 MM HG (ref 40–50)
PH BLDV: 7.45 PH (ref 7.32–7.43)
PLATELET # BLD AUTO: 196 10E9/L (ref 150–450)
PO2 BLDV: 19 MM HG (ref 25–47)
POTASSIUM SERPL-SCNC: 3.5 MMOL/L (ref 3.4–5.3)
RBC # BLD AUTO: 4.81 10E12/L (ref 4.4–5.9)
SAO2 % BLDV FROM PO2: 30 %
SODIUM SERPL-SCNC: 131 MMOL/L (ref 133–144)
TROPONIN I SERPL-MCNC: <0.015 UG/L (ref 0–0.04)
WBC # BLD AUTO: 12.3 10E9/L (ref 4–11)

## 2018-03-03 PROCEDURE — 99223 1ST HOSP IP/OBS HIGH 75: CPT | Mod: AI | Performed by: INTERNAL MEDICINE

## 2018-03-03 PROCEDURE — 12000007 ZZH R&B INTERMEDIATE

## 2018-03-03 PROCEDURE — 25000132 ZZH RX MED GY IP 250 OP 250 PS 637: Mod: GY | Performed by: INTERNAL MEDICINE

## 2018-03-03 PROCEDURE — 82803 BLOOD GASES ANY COMBINATION: CPT

## 2018-03-03 PROCEDURE — 84484 ASSAY OF TROPONIN QUANT: CPT | Performed by: EMERGENCY MEDICINE

## 2018-03-03 PROCEDURE — 71260 CT THORAX DX C+: CPT

## 2018-03-03 PROCEDURE — 93005 ELECTROCARDIOGRAM TRACING: CPT

## 2018-03-03 PROCEDURE — 25000128 H RX IP 250 OP 636: Performed by: EMERGENCY MEDICINE

## 2018-03-03 PROCEDURE — 85027 COMPLETE CBC AUTOMATED: CPT | Performed by: EMERGENCY MEDICINE

## 2018-03-03 PROCEDURE — 00000146 ZZHCL STATISTIC GLUCOSE BY METER IP

## 2018-03-03 PROCEDURE — 82550 ASSAY OF CK (CPK): CPT | Performed by: EMERGENCY MEDICINE

## 2018-03-03 PROCEDURE — 93971 EXTREMITY STUDY: CPT | Mod: LT

## 2018-03-03 PROCEDURE — 87040 BLOOD CULTURE FOR BACTERIA: CPT | Performed by: INTERNAL MEDICINE

## 2018-03-03 PROCEDURE — 25000131 ZZH RX MED GY IP 250 OP 636 PS 637: Mod: GY | Performed by: INTERNAL MEDICINE

## 2018-03-03 PROCEDURE — 83605 ASSAY OF LACTIC ACID: CPT

## 2018-03-03 PROCEDURE — 99285 EMERGENCY DEPT VISIT HI MDM: CPT | Mod: 25

## 2018-03-03 PROCEDURE — 96365 THER/PROPH/DIAG IV INF INIT: CPT

## 2018-03-03 PROCEDURE — 25000128 H RX IP 250 OP 636: Performed by: INTERNAL MEDICINE

## 2018-03-03 PROCEDURE — 80048 BASIC METABOLIC PNL TOTAL CA: CPT | Performed by: EMERGENCY MEDICINE

## 2018-03-03 PROCEDURE — 83880 ASSAY OF NATRIURETIC PEPTIDE: CPT | Performed by: EMERGENCY MEDICINE

## 2018-03-03 PROCEDURE — 36415 COLL VENOUS BLD VENIPUNCTURE: CPT | Performed by: INTERNAL MEDICINE

## 2018-03-03 PROCEDURE — 96366 THER/PROPH/DIAG IV INF ADDON: CPT

## 2018-03-03 PROCEDURE — A9270 NON-COVERED ITEM OR SERVICE: HCPCS | Mod: GY | Performed by: INTERNAL MEDICINE

## 2018-03-03 PROCEDURE — 70450 CT HEAD/BRAIN W/O DYE: CPT

## 2018-03-03 RX ORDER — ISOSORBIDE MONONITRATE 30 MG/1
30 TABLET, EXTENDED RELEASE ORAL DAILY
Status: DISCONTINUED | OUTPATIENT
Start: 2018-03-04 | End: 2018-03-07 | Stop reason: HOSPADM

## 2018-03-03 RX ORDER — POTASSIUM CHLORIDE 29.8 MG/ML
20 INJECTION INTRAVENOUS
Status: DISCONTINUED | OUTPATIENT
Start: 2018-03-03 | End: 2018-03-07 | Stop reason: HOSPADM

## 2018-03-03 RX ORDER — NICOTINE POLACRILEX 4 MG
15-30 LOZENGE BUCCAL
Status: DISCONTINUED | OUTPATIENT
Start: 2018-03-03 | End: 2018-03-05

## 2018-03-03 RX ORDER — POTASSIUM CL/LIDO/0.9 % NACL 10MEQ/0.1L
10 INTRAVENOUS SOLUTION, PIGGYBACK (ML) INTRAVENOUS
Status: DISCONTINUED | OUTPATIENT
Start: 2018-03-03 | End: 2018-03-07 | Stop reason: HOSPADM

## 2018-03-03 RX ORDER — DEXTROSE MONOHYDRATE 25 G/50ML
25-50 INJECTION, SOLUTION INTRAVENOUS
Status: DISCONTINUED | OUTPATIENT
Start: 2018-03-03 | End: 2018-03-05

## 2018-03-03 RX ORDER — IOPAMIDOL 755 MG/ML
500 INJECTION, SOLUTION INTRAVASCULAR ONCE
Status: COMPLETED | OUTPATIENT
Start: 2018-03-03 | End: 2018-03-03

## 2018-03-03 RX ORDER — SIMVASTATIN 40 MG
40 TABLET ORAL AT BEDTIME
Status: DISCONTINUED | OUTPATIENT
Start: 2018-03-03 | End: 2018-03-07 | Stop reason: HOSPADM

## 2018-03-03 RX ORDER — NALOXONE HYDROCHLORIDE 0.4 MG/ML
.1-.4 INJECTION, SOLUTION INTRAMUSCULAR; INTRAVENOUS; SUBCUTANEOUS
Status: DISCONTINUED | OUTPATIENT
Start: 2018-03-03 | End: 2018-03-07 | Stop reason: HOSPADM

## 2018-03-03 RX ORDER — SODIUM CHLORIDE 9 MG/ML
INJECTION, SOLUTION INTRAVENOUS CONTINUOUS
Status: DISCONTINUED | OUTPATIENT
Start: 2018-03-03 | End: 2018-03-04

## 2018-03-03 RX ORDER — POTASSIUM CHLORIDE 1.5 G/1.58G
20-40 POWDER, FOR SOLUTION ORAL
Status: DISCONTINUED | OUTPATIENT
Start: 2018-03-03 | End: 2018-03-07 | Stop reason: HOSPADM

## 2018-03-03 RX ORDER — CEFAZOLIN SODIUM 2 G/100ML
2 INJECTION, SOLUTION INTRAVENOUS ONCE
Status: COMPLETED | OUTPATIENT
Start: 2018-03-03 | End: 2018-03-03

## 2018-03-03 RX ORDER — ONDANSETRON 4 MG/1
4 TABLET, ORALLY DISINTEGRATING ORAL EVERY 6 HOURS PRN
Status: DISCONTINUED | OUTPATIENT
Start: 2018-03-03 | End: 2018-03-07 | Stop reason: HOSPADM

## 2018-03-03 RX ORDER — CLOPIDOGREL BISULFATE 75 MG/1
75 TABLET ORAL DAILY
Status: DISCONTINUED | OUTPATIENT
Start: 2018-03-04 | End: 2018-03-03

## 2018-03-03 RX ORDER — CLOPIDOGREL BISULFATE 75 MG/1
75 TABLET ORAL DAILY
Status: DISCONTINUED | OUTPATIENT
Start: 2018-03-03 | End: 2018-03-07 | Stop reason: HOSPADM

## 2018-03-03 RX ORDER — POTASSIUM CHLORIDE 1500 MG/1
20-40 TABLET, EXTENDED RELEASE ORAL
Status: DISCONTINUED | OUTPATIENT
Start: 2018-03-03 | End: 2018-03-07 | Stop reason: HOSPADM

## 2018-03-03 RX ORDER — PANTOPRAZOLE SODIUM 40 MG/1
40 TABLET, DELAYED RELEASE ORAL
Status: DISCONTINUED | OUTPATIENT
Start: 2018-03-04 | End: 2018-03-07 | Stop reason: HOSPADM

## 2018-03-03 RX ORDER — POTASSIUM CHLORIDE 7.45 MG/ML
10 INJECTION INTRAVENOUS
Status: DISCONTINUED | OUTPATIENT
Start: 2018-03-03 | End: 2018-03-07 | Stop reason: HOSPADM

## 2018-03-03 RX ORDER — ONDANSETRON 2 MG/ML
4 INJECTION INTRAMUSCULAR; INTRAVENOUS EVERY 6 HOURS PRN
Status: DISCONTINUED | OUTPATIENT
Start: 2018-03-03 | End: 2018-03-07 | Stop reason: HOSPADM

## 2018-03-03 RX ORDER — ACETAMINOPHEN 325 MG/1
650 TABLET ORAL EVERY 4 HOURS PRN
Status: DISCONTINUED | OUTPATIENT
Start: 2018-03-03 | End: 2018-03-07 | Stop reason: HOSPADM

## 2018-03-03 RX ADMIN — LEVOTHYROXINE SODIUM 137 MCG: 112 TABLET ORAL at 20:44

## 2018-03-03 RX ADMIN — SODIUM CHLORIDE: 9 INJECTION, SOLUTION INTRAVENOUS at 20:44

## 2018-03-03 RX ADMIN — APIXABAN 5 MG: 5 TABLET, FILM COATED ORAL at 20:40

## 2018-03-03 RX ADMIN — IOPAMIDOL 79 ML: 755 INJECTION, SOLUTION INTRAVENOUS at 16:12

## 2018-03-03 RX ADMIN — METOPROLOL TARTRATE 12.5 MG: 25 TABLET, FILM COATED ORAL at 20:40

## 2018-03-03 RX ADMIN — TAZOBACTAM SODIUM AND PIPERACILLIN SODIUM 4.5 G: 500; 4 INJECTION, SOLUTION INTRAVENOUS at 17:33

## 2018-03-03 RX ADMIN — CLOPIDOGREL 75 MG: 75 TABLET, FILM COATED ORAL at 20:40

## 2018-03-03 RX ADMIN — VANCOMYCIN HYDROCHLORIDE 1500 MG: 5 INJECTION, POWDER, LYOPHILIZED, FOR SOLUTION INTRAVENOUS at 20:57

## 2018-03-03 RX ADMIN — OXYCODONE HYDROCHLORIDE 2.5 MG: 5 TABLET ORAL at 19:43

## 2018-03-03 RX ADMIN — INSULIN GLARGINE 20 UNITS: 100 INJECTION, SOLUTION SUBCUTANEOUS at 22:30

## 2018-03-03 RX ADMIN — INSULIN ASPART 3 UNITS: 100 INJECTION, SOLUTION INTRAVENOUS; SUBCUTANEOUS at 22:06

## 2018-03-03 RX ADMIN — SODIUM CHLORIDE 90 ML: 9 INJECTION, SOLUTION INTRAVENOUS at 16:12

## 2018-03-03 RX ADMIN — TAZOBACTAM SODIUM AND PIPERACILLIN SODIUM 3.38 G: 375; 3 INJECTION, SOLUTION INTRAVENOUS at 23:50

## 2018-03-03 RX ADMIN — CEFAZOLIN SODIUM 2 G: 2 INJECTION, SOLUTION INTRAVENOUS at 16:47

## 2018-03-03 RX ADMIN — Medication 1 MG: at 21:01

## 2018-03-03 RX ADMIN — SIMVASTATIN 40 MG: 40 TABLET, FILM COATED ORAL at 21:01

## 2018-03-03 RX ADMIN — ACETAMINOPHEN 650 MG: 325 TABLET, FILM COATED ORAL at 21:01

## 2018-03-03 ASSESSMENT — ENCOUNTER SYMPTOMS
CHILLS: 0
FEVER: 0
ABDOMINAL PAIN: 0
VOMITING: 0
DIARRHEA: 0
BLOOD IN STOOL: 0
CHEST TIGHTNESS: 0
SHORTNESS OF BREATH: 0
HEADACHES: 1
DIZZINESS: 1
WEAKNESS: 1

## 2018-03-03 ASSESSMENT — ACTIVITIES OF DAILY LIVING (ADL): ADLS_ACUITY_SCORE: 9

## 2018-03-03 NOTE — ED NOTES
[facility]  ED Nurse Handoff Report    Jayro Elder is a 67 year old male   ED Chief complaint: dizzy and tired  . ED Diagnosis:   Final diagnoses:   Syncope, unspecified syncope type   Left leg cellulitis     Allergies:   Allergies   Allergen Reactions     No Known Allergies        Code Status: Full Code  Activity level - Baseline/Home:  Independent. Activity Level - Current:   Independent. Lift room needed: No. Bariatric: No   Needed: No   Isolation: No. Infection: Not Applicable.     Vital Signs:   Vitals:    03/03/18 1515 03/03/18 1545 03/03/18 1600 03/03/18 1700   BP: 138/84 142/82 138/89 129/68   Resp:       Temp:       TempSrc:       SpO2: 96% 97% 98% 95%   Weight:       Height:           Cardiac Rhythm:  ,      Pain level:    Patient confused: No. Patient Falls Risk: Yes.   Elimination Status: Has voided   Patient Report - Initial Complaint: dizziness and fatigue. Focused Assessment: Pt arrives to the ED for dizziness and fatigue. Pt had a syncopal episode yesterday where he hit head and then had a couple episodes of vomiting. Pt states feeling dizzy and tired since then. Also of note, pt has area of redness to left leg that goes from top of foot to below the knee. Area swollen and warm to touch. Pt has h/o of diabetes.  Denies any fevers.  Tests Performed: lab work, CT scan head and chest, ultrasound. Abnormal Results:   Labs Ordered and Resulted from Time of ED Arrival Up to the Time of Departure from the ED   CBC WITH PLATELETS - Abnormal; Notable for the following:        Result Value    WBC 12.3 (*)     All other components within normal limits   BASIC METABOLIC PANEL - Abnormal; Notable for the following:     Sodium 131 (*)     Glucose 256 (*)     All other components within normal limits   NT PROBNP INPATIENT - Abnormal; Notable for the following:     N-Terminal Pro BNP Inpatient 4086 (*)     All other components within normal limits   ISTAT  GASES LACTATE ZAHRA POCT -  Abnormal; Notable for the following:     Ph Venous 7.45 (*)     PO2 Venous 19 (*)     Bicarbonate Venous 29 (*)     All other components within normal limits   TROPONIN I   ISTAT CG4 GASES LACTATE ZAHRA NURSING POCT   .   Treatments provided: ancef and zosyn  Family Comments: Pt was here with family who was updated on plan of care  OBS brochure/video discussed/provided to patient:  Yes  ED Medications:   Medications   ceFAZolin (ANCEF) intermittent infusion 2 g in 100 mL dextrose PRE-MIX (2 g Intravenous Given 3/3/18 1647)   piperacillin-tazobactam (ZOSYN) intermittent infusion 4.5 g (not administered)   0.9% sodium chloride BOLUS (0 mLs Intravenous Stopped 3/3/18 1618)   iopamidol (ISOVUE-370) solution 500 mL (79 mLs Intravenous Given 3/3/18 1612)     Drips infusing:  Yes  For the majority of the shift, the patient's behavior Green. Interventions performed were N/A.     Severe Sepsis OR Septic Shock Diagnosis Present: No      ED Nurse Name/Phone Number: Princess Parker,   5:15 PM  RECEIVING UNIT ED HANDOFF REVIEW    Above ED Nurse Handoff Report was reviewed: Yes  Reviewed by: Cathleen Goodman on March 3, 2018 at 6:33 PM

## 2018-03-03 NOTE — IP AVS SNAPSHOT
[place] [address] [facility]    [address]    [facility] [address]    Phone:  [phone]    Fax:  [phone]                                       After Visit Summary   3/3/2018    Jayro Elder    MRN: [id number]           After Visit Summary Signature Page     I have received my discharge instructions, and my questions have been answered. I have discussed any challenges I see with this plan with the nurse or doctor.    ..........................................................................................................................................  Patient/Patient Representative Signature      ..........................................................................................................................................  Patient Representative Print Name and Relationship to Patient    ..................................................               ................................................  Date                                            Time    ..........................................................................................................................................  Reviewed by Signature/Title    ...................................................              ..............................................  Date                                                            Time

## 2018-03-03 NOTE — ED NOTES
Pt feeling weak, dizzy.  Pt reports having syncope and fall yesterday.  Pt also has swelling of legs.

## 2018-03-03 NOTE — ED NOTES
Pt ambulated to bathroom independently. Pt states feeling dizzy upon standing from bed but was able to ambulate with steady gait.

## 2018-03-03 NOTE — ED PROVIDER NOTES
History     Chief Complaint:  Dizziness    HPI   Jayro Elder is a 67 year old male on Plavix and with a history of diabetic neurotrophy, MI and stroke  who presents with his wife to the Emergency Department for evaluation of dizziness. The patient reports that yesterday he had a syncopal episode,  fall with a hit to the head and 2 episodes of nausea and vomiting.  He had no prodrome or he did not feel lightheaded before fainting.  He states he landed on his face, because he woke up with blood nose and also had pain in his left lateral chest.  He arrives to the E.D feeling weak, dizzy, and with a diffuse headache   He states that he also has left leg swelling and redness, he thinks he first noticed the redness yesterday but is unsure. He denies any recent injury to his leg. His family reports that he has had a loss of appetite  for the past 2 days, and has been sleeping more than usual. He has no history of blood clots. He denies confusion, abdominal pain, fevers, or blood in stools       Allergies:  No Known Drug Allergies     Medications:    albuterol (PROAIR HFA/PROVENTIL HFA/VENTOLIN HFA) 108 (90 BASE) MCG/ACT Inhaler  lisinopril (PRINIVIL/ZESTRIL) 20 MG tablet  clopidogrel (PLAVIX) 75 MG tablet  apixaban ANTICOAGULANT (ELIQUIS) 5 MG tablet  amLODIPine (NORVASC) 5 MG tablet  insulin glargine (LANTUS) 100 UNIT/ML injection  isosorbide mononitrate (IMDUR) 30 MG 24 hr tablet  simvastatin (ZOCOR) 40 MG tablet  metoprolol (TOPROL-XL) 100 MG 24 hr tablet  nitroglycerin (NITROSTAT) 0.4 MG sublingual tablet  pantoprazole (PROTONIX) 40 MG enteric coated tablet  metFORMIN (GLUCOPHAGE) 1000 MG tablet  levothyroxine (SYNTHROID, LEVOTHROID) 137 MCG tablet    Past Medical History:    CAD (coronary artery disease)   Cardiomyopathy (H)   Essential hypertension, benign   Generalized osteoarthrosis, unspecified site  Myocardial infarction   Neuropathy   Tobacco use disorder   Type II or unspecified type diabetes mellitus  "without mention of complication, not stated as uncontrolled     Past Surgical History:    Angiogram x4  Heart cath left-SUSAN to OM3  Gingival surgery   Bunionectomy   Laminectomy x3  Restenosis, stent LAD    Family History:    Cancer-colorectal   Thyroid disease  DM  Cancer-chest and throat   Arthritis     Social History:  Marital Status:     The patient was accompanied to the ED by his wife  Smoking Status: Former Smoker  Smokeless Tobacco: Never  Alcohol Use: Yes-3 shots per week     Review of Systems   Constitutional: Negative for chills and fever.   Respiratory: Negative for chest tightness and shortness of breath.    Cardiovascular: Positive for chest pain and leg swelling.   Gastrointestinal: Negative for abdominal pain, blood in stool, diarrhea and vomiting.   Neurological: Positive for dizziness, weakness and headaches.        No confusion    All other systems reviewed and are negative.    Physical Exam   First Vitals:  BP: 139/80  Heart Rate: 101  Temp: 98.3  F (36.8  C)  Resp: 18  Height: 193 cm (6' 4\")  Weight: 108.9 kg (240 lb)  SpO2: 98 %      Physical Exam  Constitutional: Alert, attentive, GCS 15, middle aged male laying in bed, in mild distress  HENT:    Nose: Nose normal.    Mouth/Throat: Oropharynx is clear, mucous membranes are moist   Eyes: Normal conjunctiva. Pupils are equal, round, and reactive to light.   Neck: full range of motion, no C-spine tenderness.   CV: regular rate and rhythm; no murmurs, rubs or gallups  Chest: Effort normal and breath sounds normal.  No wheezing, rales, or rhonchi.   Left anterior/lateral chest wall tenderness, no crepitus.    GI:  There is no tenderness. No distension. Normal bowel sounds  MSK: Normal range of motion, 2+ pitting edema in left leg with skin changes as described below, 1+ pitting edema in right leg    Neurological: Alert, attentive, oriented x4, strength 5/5 in all extremities   Skin: Skin is warm, large area of erythema along left anterior " shin with streaking up left leg, and into ankle, induration and warmth, area outlined with marker.   Emergency Department Course     ECG:  Indication: Dizziness  Completed at 14:30.  Read at 14:58.   Atrial fibrillation with rapid ventricular response. Right bundle branch block. Left anterior fascicular block. Bifascicular block. Anteroseptal infarct, age undermined. Abnormal ECG. A fib new with PVR compared to last ECG. T wave inversion V3V3  Rate 113 bpm. ID interval *. QRS duration 140. QT/QTc 368/504. P-R-T axes * -47 -23.     Imaging:  Radiology findings were communicated with the patient and family who voiced understanding of the findings.    US Lower Extremity Venous Duplex Left:   No DVT demonstrated.  Report per radiology      CT Chest  Pulmonary embolism  W Contrast:   1. No evidence of pulmonary embolism. No evidence of aortic aneurysm  or dissection.  2. Previously seen right pleural effusion has resolved. Lungs are  clear.  3. 2.2 cm nodular density with some fat signal intensity within it  likely representing an adrenal adenoma. No significant change.    Report per radiology        Laboratory:  Laboratory findings were communicated with the patient and family who voiced understanding of the findings.    ISTAT gases lactate derick  POCT: pH 7.45 (H), PCO2 19 (L), Bicarbonate 29 (H), O2 sat 30   CBC: AWNL. (WBC 12.3 (H) , HGB 13.8 , )    BMP: AWNL  (L), Glucose 256 (H) o/w WNL (Creatinine 0.92)   BNP: 4086 (H)   Troponin I: < 0.015     Interventions:  1612: 0.9% Sodium Chloride BOLUS 90 mL IV  1647: Ancef 2 g IV  1733: Zosyn 4.5 g IV      Emergency Department Course:  IV was inserted and blood was drawn for laboratory testing, results above.  The patient was sent for US, CT while in the emergency department, results above.     Nursing notes and vitals reviewed.  1504: I performed an exam of the patient as documented above.   1650: Patient rechecked and updated.   1716: I spoke with Dr. Baker  of the Hospitalist service  regarding patient's presentation, findings, and plan of care.  1716: I spoke with Ciro regarding patient's presentation, findings, and plan of care.    Findings and plan explained to the Patient and spouse who consents to admission. Discussed the patient with Dr. Baker, who will admit the patient to a Med/Surg bed for further monitoring, evaluation, and treatment.      Impression & Plan         Medical Decision Makin-year-old male with history of atrial fibrillation on Eliquis, TIA, and diabetes, presenting with multiple symptoms including syncopal episode yesterday and left leg swelling and redness.  Differential diagnosis is broad and includes pulmonary embolism, DVT, leg cellulitis, necrotizing skin infection, septic ankle arthritis, intracranial hemorrhage.  Given that the patient is anticoagulated and had a fall with a syncopal episode, CT head was obtained.  It does not show any evidence of bleeding.  I was also concerned for pulmonary embolism given his syncope and unilateral leg symptoms.  CT chest does not show any evidence of pulmonary embolism and there are old left posterior rib fractures but no new rib fractures.  Patient's redness and swelling in his anterior left shin with streaking is concerning for cellulitis.  DVT duplex ultrasound is negative.  He was given Ancef for typical skin coverage as well as Zosyn given his history of diabetes.  He meets sepsis criteria, but vital signs have been stable and his lactate is normal.  He has no evidence of end-organ dysfunction.  He will be admitted to [facility] floor for IV antibiotics and further evaluation.        Diagnosis:    ICD-10-CM    1. Syncope, unspecified syncope type R55 Blood culture     CK total     CK total     CANCELED: CK total   2. Left leg cellulitis L03.116        Disposition:  Admitted to Med/Surg      Isabelle Arrieta  3/3/2018   [facility] EMERGENCY DEPARTMENT      Scribe Disclosure:  JOSE DE JESUS  Isabelle Arrieta, am serving as a scribe at 3:04 PM on 3/3/2018 to document services personally performed by Courtney Dunbar MD based on my observations and the provider's statements to me.       Courtney Dunbar MD  03/03/18 0397

## 2018-03-03 NOTE — IP AVS SNAPSHOT
MRN:[id number]                      After Visit Summary   3/3/2018    Jayro Elder    MRN: [id number]           Thank you!     Thank you for choosing [facility] for your care. Our goal is always to provide you with excellent care. Hearing back from our patients is one way we can continue to improve our services. Please take a few minutes to complete the written survey that you may receive in the mail after you visit. If you would like to speak to someone directly about your visit please contact Patient Relations at [phone]. Thank you!          Patient Information     Date Of Birth          1950        Designated Caregiver       Most Recent Value    Caregiver    Will someone help with your care after discharge? no      About your hospital stay     You were admitted on:  March 3, 2018 You last received care in the:  [place] [address] [facility]    You were discharged on:  March 7, 2018        Reason for your hospital stay       Cellulitis                  Who to Call     For medical emergencies, please call 911.  For non-urgent questions about your medical care, please call your primary care provider or clinic, [phone]          Attending Provider     Provider Specialty    Courtney Dunbar MD Emergency Medicine    Anthony Baker DO Internal Medicine       Primary Care Provider Office Phone # Fax #    Davion Cordova PA-C [phone]      After Care Instructions     Activity       Your activity upon discharge: activity as tolerated            Diet       Follow this diet upon discharge: Moderate Consistent CHO Diet            Discharge Instructions       Surgical shoe required for treatment of diabetic foot ulcer.                  Follow-up Appointments     Follow-up and recommended labs and tests        Follow up with primary doctor within 1 week to follow up on cellulitis and review blood glucose management.  Check blood glucose 3 times  daily and call clinic if greater than 250 or less than 70.  Follow up with wound care and podiatry as scheduled.                  Your next 10 appointments already scheduled     Mar 12, 2018 11:00 AM CDT   Office Visit with Vinicio Cook MD   [facility] ([facility]    [address] [place] [id number] [phone]           Bring a current list of meds and any records pertaining to this visit. For Physicals, please bring immunization records and any forms needing to be filled out. Please arrive 10 minutes early to complete paperwork.              Further instructions from your care team         Patient Education    Cephalexin Hydrochloride Oral tablet    Cephalexin Monohydrate Oral capsule    Cephalexin Monohydrate Oral suspension    Cephalexin Monohydrate Oral tablet  Cephalexin Hydrochloride Oral tablet  What is this medicine?  CEPHALEXIN (sef a ARBEN in) is a cephalosporin antibiotic. It is used to treat certain kinds of bacterial infections It will not work for colds, flu, or other viral infections.  This medicine may be used for other purposes; ask your health care provider or pharmacist if you have questions.  What should I tell my health care provider before I take this medicine?  They need to know if you have any of these conditions:    kidney disease    stomach or intestine problems, especially colitis    an unusual or allergic reaction to cephalexin, other cephalosporins, penicillins, other antibiotics, medicines, foods, dyes or preservatives    pregnant or trying to get pregnant    breast-feeding  How should I use this medicine?  Take this medicine by mouth with a full glass of water. Follow the directions on the prescription label. This medicine can be taken with or without food. Take your medicine at regular intervals. Do not take your medicine more often than directed. Take all of your medicine as directed even if you think you are better. Do  not skip doses or stop your medicine early.  Talk to your pediatrician regarding the use of this medicine in children. While this drug may be prescribed for selected conditions, precautions do apply.  Overdosage: If you think you have taken too much of this medicine contact a poison control center or emergency room at once.  NOTE: This medicine is only for you. Do not share this medicine with others.  What if I miss a dose?  If you miss a dose, take it as soon as you can. If it is almost time for your next dose, take only that dose. Do not take double or extra doses. There should be at least 4 to 6 hours between doses.  What may interact with this medicine?    probenecid    some other antibiotics  This list may not describe all possible interactions. Give your health care provider a list of all the medicines, herbs, non-prescription drugs, or dietary supplements you use. Also tell them if you smoke, drink alcohol, or use illegal drugs. Some items may interact with your medicine.  What should I watch for while using this medicine?  Tell your doctor or health care professional if your symptoms do not begin to improve in a few days.  Do not treat diarrhea with over the counter products. Contact your doctor if you have diarrhea that lasts more than 2 days or if it is severe and watery.  If you have diabetes, you may get a false-positive result for sugar in your urine. Check with your doctor or health care professional.  What side effects may I notice from receiving this medicine?  Side effects that you should report to your doctor or health care professional as soon as possible:    allergic reactions like skin rash, itching or hives, swelling of the face, lips, or tongue    breathing problems    pain or trouble passing urine    redness, blistering, peeling or loosening of the skin, including inside the mouth    severe or watery diarrhea    unusually weak or tired    yellowing of the eyes, skin  Side effects that usually  "do not require medical attention (report to your doctor or health care professional if they continue or are bothersome):    gas or heartburn    genital or anal irritation    headache    joint or muscle pain    nausea, vomiting  This list may not describe all possible side effects. Call your doctor for medical advice about side effects. You may report side effects to FDA at 9-016-WWX-7968.  Where should I keep my medicine?  Keep out of the reach of children.  Store at room temperature between 59 and 86 degrees F (15 and 30 degrees C). Throw away any unused medicine after the expiration date.  NOTE:This sheet is a summary. It may not cover all possible information. If you have questions about this medicine, talk to your doctor, pharmacist, or health care provider. Copyright  2016 Gold Standard          Pending Results     Date and Time Order Name Status Description    3/3/2018 1828 Blood culture Preliminary     3/3/2018 1828 Blood culture Preliminary             Statement of Approval     Ordered          03/07/18 1456  I have reviewed and agree with all the recommendations and orders detailed in this document.  EFFECTIVE NOW     Approved and electronically signed by:  Davion Mir MD             Admission Information     Date & Time Provider Department Dept. Phone    3/3/2018 Anthony Baker,  [place] [address] [facility] [phone]      Your Vitals Were     Blood Pressure Pulse Temperature Respirations Height Weight    146/91 (BP Location: Left arm) 77 98.1  F (36.7  C) (Oral) 16 1.93 m (6' 4\") 109.6 kg (241 lb 9.6 oz)    Pulse Oximetry BMI (Body Mass Index)                95% 29.41 kg/m2          clinovo Information     clinovo lets you send messages to your doctor, view your test results, renew your prescriptions, schedule appointments and more. To sign up, go to [email or web address] . Click on \"Log in\" on the left side of the screen, which will take you to the Welcome page. Then click on " "\"Sign up Now\" on the right side of the page.     You will be asked to enter the access code listed below, as well as some personal information. Please follow the directions to create your username and password.     Your access code is: [email or web address]  Expires: 2018  2:41 PM     Your access code will  in 90 days. If you need help or a new code, please call your [facility] clinic or [phone].        Care EveryWhere ID     This is your Care EveryWhere ID. This could be used by other organizations to access your [facility] medical records  [id number]        Equal Access to Services     [facility]: Alan Miller, raymond gardner, arpit rhoades, rivera monet . [facility] [phone].    ATENCIÓN: Si habla español, tiene a renteria disposición servicios gratuitos de asistencia lingüística. [facility] [phone].    We comply with applicable federal civil rights laws and [place] laws. We do not discriminate on the basis of race, color, national origin, age, disability, sex, sexual orientation, or gender identity.               Review of your medicines      START taking        Dose / Directions    cephALEXin 500 MG capsule   Commonly known as:  KEFLEX   Used for:  Left leg cellulitis        Dose:  500 mg   Take 1 capsule (500 mg) by mouth 4 times daily for 10 days   Quantity:  40 capsule   Refills:  0         CONTINUE these medicines which may have CHANGED, or have new prescriptions. If we are uncertain of the size of tablets/capsules you have at home, strength may be listed as something that might have changed.        Dose / Directions    insulin glargine 100 UNIT/ML injection   Commonly known as:  LANTUS   This may have changed:  how much to take        Dose:  53 Units   Inject 53 Units Subcutaneous At Bedtime Daily at 1700   Refills:  0         CONTINUE these medicines which have NOT CHANGED        Dose / Directions    amLODIPine 5 MG tablet   Commonly known " as:  NORVASC   Used for:  Coronary artery disease involving native coronary artery of native heart with angina pectoris (H)        Dose:  5 mg   Take 1 tablet (5 mg) by mouth daily   Quantity:  90 tablet   Refills:  3       clopidogrel 75 MG tablet   Commonly known as:  PLAVIX   Used for:  Coronary artery disease involving native coronary artery of native heart with other form of angina pectoris (H)        Dose:  75 mg   Take 1 tablet (75 mg) by mouth daily   Quantity:  90 tablet   Refills:  2       D3 ADULT PO        Dose:  2000 Units   Take 2,000 Units by mouth every evening   Refills:  0       ELIQUIS 5 MG tablet   Generic drug:  apixaban ANTICOAGULANT        Take by mouth 2 times daily   Refills:  0       isosorbide mononitrate 30 MG 24 hr tablet   Commonly known as:  IMDUR   Used for:  Hypertension goal BP (blood pressure) < 140/90        Dose:  30 mg   Take 1 tablet (30 mg) by mouth daily   Quantity:  90 tablet   Refills:  3       levothyroxine 137 MCG tablet   Commonly known as:  SYNTHROID/LEVOTHROID        Dose:  137 mcg   Take 137 mcg by mouth At Bedtime   Quantity:  90 tablet   Refills:  3       lisinopril 20 MG tablet   Commonly known as:  PRINIVIL/ZESTRIL   Used for:  Benign essential hypertension        Dose:  20 mg   Take 1 tablet (20 mg) by mouth 2 times daily   Quantity:  180 tablet   Refills:  3       metoprolol succinate 100 MG 24 hr tablet   Commonly known as:  TOPROL-XL   Used for:  Coronary artery disease involving native coronary artery of native heart without angina pectoris        Dose:  100 mg   Take 1 tablet (100 mg) by mouth At Bedtime   Quantity:  90 tablet   Refills:  3       nitroGLYcerin 0.4 MG sublingual tablet   Commonly known as:  NITROSTAT   Used for:  Other chest pain        Dose:  0.4 mg   Place 1 tablet (0.4 mg) under the tongue every 5 minutes as needed for chest pain   Quantity:  50 tablet   Refills:  0       pantoprazole 40 MG EC tablet   Commonly known as:  PROTONIX   Used  for:  GERD (gastroesophageal reflux disease)        Dose:  40 mg   Take 1 tablet (40 mg) by mouth every morning (before breakfast)   Quantity:  30 tablet   Refills:  0       simvastatin 40 MG tablet   Commonly known as:  ZOCOR   Used for:  Coronary artery disease involving native coronary artery of native heart without angina pectoris        Dose:  40 mg   Take 1 tablet (40 mg) by mouth At Bedtime   Quantity:  90 tablet   Refills:  3         STOP taking     metFORMIN 1000 MG tablet   Commonly known as:  GLUCOPHAGE                Where to get your medicines      [place] [facility] [place] [address] [facility]  [address] [facility] [address]     Phone:  [phone]     cephALEXin 500 MG capsule                Protect others around you: Learn how to safely use, store and throw away your medicines at www.disposemymeds.org.        ANTIBIOTIC INSTRUCTION     You've Been Prescribed an Antibiotic - Now What?  Your healthcare team thinks that you or your loved one might have an infection. Some infections can be treated with antibiotics, which are powerful, life-saving drugs. Like all medications, antibiotics have side effects and should only be used when necessary. There are some important things you should know about your antibiotic treatment.      Your healthcare team may run tests before you start taking an antibiotic.    Your team may take samples (e.g., from your blood, urine or other areas) to run tests to look for bacteria. These test can be important to determine if you need an antibiotic at all and, if you do, which antibiotic will work best.      Within a few days, your healthcare team might change or even stop your antibiotic.    Your team may start you on an antibiotic while they are working to find out what is making you sick.    Your team might change your antibiotic because test results show that a different antibiotic would be better to treat  your infection.    In some cases, once your team has more information, they learn that you do not need an antibiotic at all. They may find out that you don't have an infection, or that the antibiotic you're taking won't work against your infection. For example, an infection caused by a virus can't be treated with antibiotics. Staying on an antibiotic when you don't need it is more likely to be harmful than helpful.      You may experience side effects from your antibiotic.    Like all medications, antibiotics have side effects. Some of these can be serious.    Let you healthcare team know if you have any known allergies when you are admitted to the hospital.    One significant side effect of nearly all antibiotics is the risk of severe and sometimes deadly diarrhea caused by Clostridium difficile (C. Difficile). This occurs when a person takes antibiotics because some good germs are destroyed. Antibiotic use allows C. diificile to take over, putting patients at high risk for this serious infection.    As a patient or caregiver, it is important to understand your or your loved one's antibiotic treatment. It is especially important for caregivers to speak up when patients can't speak for themselves. Here are some important questions to ask your healthcare team.    What infection is this antibiotic treating and how do you know I have that infection?    What side effects might occur from this antibiotic?    How long will I need to take this antibiotic?    Is it safe to take this antibiotic with other medications or supplements (e.g., vitamins) that I am taking?     Are there any special directions I need to know about taking this antibiotic? For example, should I take it with food?    How will I be monitored to know whether my infection is responding to the antibiotic?    What tests may help to make sure the right antibiotic is prescribed for me?      Information provided by:  www.cdc.gov/getsmart  U.S. Department of  Health and Human Services  Centers for disease Control and Prevention  National Center for Emerging and Zoonotic Infectious Diseases  Division of Healthcare Quality Promotion             Medication List: This is a list of all your medications and when to take them. Check marks below indicate your daily home schedule. Keep this list as a reference.      Medications           Morning Afternoon Evening Bedtime As Needed    amLODIPine 5 MG tablet   Commonly known as:  NORVASC   Take 1 tablet (5 mg) by mouth daily   Next Dose Due:  03/08                                   cephALEXin 500 MG capsule   Commonly known as:  KEFLEX   Take 1 capsule (500 mg) by mouth 4 times daily for 10 days   Next Dose Due:  Start 03/08 in the morning. TAKE FOUR TIMES A DAY                                            clopidogrel 75 MG tablet   Commonly known as:  PLAVIX   Take 1 tablet (75 mg) by mouth daily   Last time this was given:  75 mg on 3/7/2018  8:48 AM   Next Dose Due:  03/08                                   D3 ADULT PO   Take 2,000 Units by mouth every evening   Next Dose Due:  03/07                                   ELIQUIS 5 MG tablet   Take by mouth 2 times daily   Last time this was given:  5 mg on 3/7/2018  8:48 AM   Generic drug:  apixaban ANTICOAGULANT   Next Dose Due:  TAKE THIS EVENING AND THEN TWICE A DAY                                insulin glargine 100 UNIT/ML injection   Commonly known as:  LANTUS   Inject 53 Units Subcutaneous At Bedtime Daily at 1700   Next Dose Due:  TAKE TONIGHT (03/07)                                   isosorbide mononitrate 30 MG 24 hr tablet   Commonly known as:  IMDUR   Take 1 tablet (30 mg) by mouth daily   Last time this was given:  30 mg on 3/7/2018  8:48 AM   Next Dose Due:  03/08                                   levothyroxine 137 MCG tablet   Commonly known as:  SYNTHROID/LEVOTHROID   Take 137 mcg by mouth At Bedtime   Last time this was given:  137 mcg on 3/6/2018  8:52 PM   Next Dose  Due:  TAKE TONIGHT 03/07                                   lisinopril 20 MG tablet   Commonly known as:  PRINIVIL/ZESTRIL   Take 1 tablet (20 mg) by mouth 2 times daily   Last time this was given:  20 mg on 3/7/2018  8:48 AM   Next Dose Due:  TAKE TONIGHT (03/07)                                      metoprolol succinate 100 MG 24 hr tablet   Commonly known as:  TOPROL-XL   Take 1 tablet (100 mg) by mouth At Bedtime   Last time this was given:  100 mg on 3/6/2018  8:53 PM   Next Dose Due:  TAKE TONIGHT 03/07                                   nitroGLYcerin 0.4 MG sublingual tablet   Commonly known as:  NITROSTAT   Place 1 tablet (0.4 mg) under the tongue every 5 minutes as needed for chest pain                                   pantoprazole 40 MG EC tablet   Commonly known as:  PROTONIX   Take 1 tablet (40 mg) by mouth every morning (before breakfast)   Last time this was given:  40 mg on 3/7/2018  6:49 AM   Next Dose Due:  Take 03/08                                   simvastatin 40 MG tablet   Commonly known as:  ZOCOR   Take 1 tablet (40 mg) by mouth At Bedtime   Last time this was given:  40 mg on 3/6/2018  8:52 PM   Next Dose Due:  TAKE TONIGHT 03/07

## 2018-03-04 ENCOUNTER — APPOINTMENT (OUTPATIENT)
Dept: MRI IMAGING | Facility: CLINIC | Age: 68
DRG: 872 | End: 2018-03-04
Attending: PODIATRIST
Payer: MEDICARE

## 2018-03-04 ENCOUNTER — APPOINTMENT (OUTPATIENT)
Dept: GENERAL RADIOLOGY | Facility: CLINIC | Age: 68
DRG: 872 | End: 2018-03-04
Attending: PODIATRIST
Payer: MEDICARE

## 2018-03-04 ENCOUNTER — APPOINTMENT (OUTPATIENT)
Dept: ULTRASOUND IMAGING | Facility: CLINIC | Age: 68
DRG: 872 | End: 2018-03-04
Attending: PODIATRIST
Payer: MEDICARE

## 2018-03-04 LAB
ANION GAP SERPL CALCULATED.3IONS-SCNC: 7 MMOL/L (ref 3–14)
BUN SERPL-MCNC: 13 MG/DL (ref 7–30)
CALCIUM SERPL-MCNC: 8 MG/DL (ref 8.5–10.1)
CHLORIDE SERPL-SCNC: 101 MMOL/L (ref 94–109)
CO2 SERPL-SCNC: 26 MMOL/L (ref 20–32)
CREAT SERPL-MCNC: 0.92 MG/DL (ref 0.66–1.25)
CRP SERPL-MCNC: 152 MG/L (ref 0–8)
ERYTHROCYTE [DISTWIDTH] IN BLOOD BY AUTOMATED COUNT: 12.9 % (ref 10–15)
ERYTHROCYTE [SEDIMENTATION RATE] IN BLOOD BY WESTERGREN METHOD: 17 MM/H (ref 0–20)
GFR SERPL CREATININE-BSD FRML MDRD: 82 ML/MIN/1.7M2
GLUCOSE BLDC GLUCOMTR-MCNC: 181 MG/DL (ref 70–99)
GLUCOSE BLDC GLUCOMTR-MCNC: 211 MG/DL (ref 70–99)
GLUCOSE BLDC GLUCOMTR-MCNC: 229 MG/DL (ref 70–99)
GLUCOSE BLDC GLUCOMTR-MCNC: 312 MG/DL (ref 70–99)
GLUCOSE SERPL-MCNC: 179 MG/DL (ref 70–99)
HBA1C MFR BLD: 8.8 % (ref 4.3–6)
HCT VFR BLD AUTO: 37.5 % (ref 40–53)
HGB BLD-MCNC: 12.7 G/DL (ref 13.3–17.7)
LACTATE BLD-SCNC: 1.4 MMOL/L (ref 0.7–2)
MCH RBC QN AUTO: 28.9 PG (ref 26.5–33)
MCHC RBC AUTO-ENTMCNC: 33.9 G/DL (ref 31.5–36.5)
MCV RBC AUTO: 85 FL (ref 78–100)
PLATELET # BLD AUTO: 178 10E9/L (ref 150–450)
POTASSIUM SERPL-SCNC: 3.4 MMOL/L (ref 3.4–5.3)
RBC # BLD AUTO: 4.4 10E12/L (ref 4.4–5.9)
SODIUM SERPL-SCNC: 134 MMOL/L (ref 133–144)
WBC # BLD AUTO: 9.8 10E9/L (ref 4–11)

## 2018-03-04 PROCEDURE — 36415 COLL VENOUS BLD VENIPUNCTURE: CPT | Performed by: INTERNAL MEDICINE

## 2018-03-04 PROCEDURE — 86140 C-REACTIVE PROTEIN: CPT | Performed by: PODIATRIST

## 2018-03-04 PROCEDURE — 80048 BASIC METABOLIC PNL TOTAL CA: CPT | Performed by: INTERNAL MEDICINE

## 2018-03-04 PROCEDURE — 00000146 ZZHCL STATISTIC GLUCOSE BY METER IP

## 2018-03-04 PROCEDURE — A9585 GADOBUTROL INJECTION: HCPCS | Performed by: INTERNAL MEDICINE

## 2018-03-04 PROCEDURE — A9270 NON-COVERED ITEM OR SERVICE: HCPCS | Mod: GY | Performed by: INTERNAL MEDICINE

## 2018-03-04 PROCEDURE — 99222 1ST HOSP IP/OBS MODERATE 55: CPT | Performed by: PODIATRIST

## 2018-03-04 PROCEDURE — 85027 COMPLETE CBC AUTOMATED: CPT | Performed by: INTERNAL MEDICINE

## 2018-03-04 PROCEDURE — 83605 ASSAY OF LACTIC ACID: CPT | Performed by: INTERNAL MEDICINE

## 2018-03-04 PROCEDURE — 99233 SBSQ HOSP IP/OBS HIGH 50: CPT | Performed by: INTERNAL MEDICINE

## 2018-03-04 PROCEDURE — 93922 UPR/L XTREMITY ART 2 LEVELS: CPT

## 2018-03-04 PROCEDURE — 25000128 H RX IP 250 OP 636: Performed by: INTERNAL MEDICINE

## 2018-03-04 PROCEDURE — 86140 C-REACTIVE PROTEIN: CPT | Performed by: INTERNAL MEDICINE

## 2018-03-04 PROCEDURE — 83036 HEMOGLOBIN GLYCOSYLATED A1C: CPT | Performed by: INTERNAL MEDICINE

## 2018-03-04 PROCEDURE — 25000132 ZZH RX MED GY IP 250 OP 250 PS 637: Mod: GY | Performed by: INTERNAL MEDICINE

## 2018-03-04 PROCEDURE — 73620 X-RAY EXAM OF FOOT: CPT | Mod: LT

## 2018-03-04 PROCEDURE — 85652 RBC SED RATE AUTOMATED: CPT | Performed by: INTERNAL MEDICINE

## 2018-03-04 PROCEDURE — 12000007 ZZH R&B INTERMEDIATE

## 2018-03-04 PROCEDURE — 73720 MRI LWR EXTREMITY W/O&W/DYE: CPT | Mod: LT

## 2018-03-04 PROCEDURE — 25000131 ZZH RX MED GY IP 250 OP 636 PS 637: Mod: GY | Performed by: INTERNAL MEDICINE

## 2018-03-04 RX ORDER — METOPROLOL TARTRATE 50 MG
50 TABLET ORAL 2 TIMES DAILY
Status: DISCONTINUED | OUTPATIENT
Start: 2018-03-04 | End: 2018-03-04

## 2018-03-04 RX ORDER — METOPROLOL SUCCINATE 100 MG/1
100 TABLET, EXTENDED RELEASE ORAL AT BEDTIME
Status: DISCONTINUED | OUTPATIENT
Start: 2018-03-04 | End: 2018-03-07 | Stop reason: HOSPADM

## 2018-03-04 RX ORDER — GADOBUTROL 604.72 MG/ML
10 INJECTION INTRAVENOUS ONCE
Status: COMPLETED | OUTPATIENT
Start: 2018-03-04 | End: 2018-03-04

## 2018-03-04 RX ADMIN — APIXABAN 5 MG: 5 TABLET, FILM COATED ORAL at 07:42

## 2018-03-04 RX ADMIN — ACETAMINOPHEN 650 MG: 325 TABLET, FILM COATED ORAL at 10:58

## 2018-03-04 RX ADMIN — TAZOBACTAM SODIUM AND PIPERACILLIN SODIUM 3.38 G: 375; 3 INJECTION, SOLUTION INTRAVENOUS at 17:45

## 2018-03-04 RX ADMIN — CLOPIDOGREL 75 MG: 75 TABLET, FILM COATED ORAL at 07:42

## 2018-03-04 RX ADMIN — SODIUM CHLORIDE: 9 INJECTION, SOLUTION INTRAVENOUS at 05:43

## 2018-03-04 RX ADMIN — GADOBUTROL 10 ML: 604.72 INJECTION INTRAVENOUS at 11:49

## 2018-03-04 RX ADMIN — ISOSORBIDE MONONITRATE 30 MG: 30 TABLET, EXTENDED RELEASE ORAL at 07:43

## 2018-03-04 RX ADMIN — LEVOTHYROXINE SODIUM 137 MCG: 112 TABLET ORAL at 21:20

## 2018-03-04 RX ADMIN — INSULIN GLARGINE 30 UNITS: 100 INJECTION, SOLUTION SUBCUTANEOUS at 21:38

## 2018-03-04 RX ADMIN — ACETAMINOPHEN 650 MG: 325 TABLET, FILM COATED ORAL at 20:21

## 2018-03-04 RX ADMIN — METOPROLOL TARTRATE 12.5 MG: 25 TABLET, FILM COATED ORAL at 07:43

## 2018-03-04 RX ADMIN — TAZOBACTAM SODIUM AND PIPERACILLIN SODIUM 3.38 G: 375; 3 INJECTION, SOLUTION INTRAVENOUS at 23:58

## 2018-03-04 RX ADMIN — SIMVASTATIN 40 MG: 40 TABLET, FILM COATED ORAL at 21:20

## 2018-03-04 RX ADMIN — METOPROLOL SUCCINATE 100 MG: 100 TABLET, EXTENDED RELEASE ORAL at 21:20

## 2018-03-04 RX ADMIN — VANCOMYCIN HYDROCHLORIDE 1500 MG: 5 INJECTION, POWDER, LYOPHILIZED, FOR SOLUTION INTRAVENOUS at 07:54

## 2018-03-04 RX ADMIN — VANCOMYCIN HYDROCHLORIDE 1500 MG: 5 INJECTION, POWDER, LYOPHILIZED, FOR SOLUTION INTRAVENOUS at 19:34

## 2018-03-04 RX ADMIN — INSULIN ASPART 3 UNITS: 100 INJECTION, SOLUTION INTRAVENOUS; SUBCUTANEOUS at 21:39

## 2018-03-04 RX ADMIN — PANTOPRAZOLE SODIUM 40 MG: 40 TABLET, DELAYED RELEASE ORAL at 06:50

## 2018-03-04 RX ADMIN — METOPROLOL TARTRATE 37.5 MG: 25 TABLET, FILM COATED ORAL at 07:59

## 2018-03-04 RX ADMIN — TAZOBACTAM SODIUM AND PIPERACILLIN SODIUM 3.38 G: 375; 3 INJECTION, SOLUTION INTRAVENOUS at 05:47

## 2018-03-04 RX ADMIN — APIXABAN 5 MG: 5 TABLET, FILM COATED ORAL at 20:21

## 2018-03-04 RX ADMIN — TAZOBACTAM SODIUM AND PIPERACILLIN SODIUM 3.38 G: 375; 3 INJECTION, SOLUTION INTRAVENOUS at 11:01

## 2018-03-04 ASSESSMENT — ACTIVITIES OF DAILY LIVING (ADL)
ADLS_ACUITY_SCORE: 10
ADLS_ACUITY_SCORE: 10
ADLS_ACUITY_SCORE: 8
ADLS_ACUITY_SCORE: 8
ADLS_ACUITY_SCORE: 10
ADLS_ACUITY_SCORE: 8

## 2018-03-04 NOTE — H&P
[facility]    Hospitalist History and Physical    Name: Jayro Elder    MRN: [id number]  YOB: 1950    Age: 67 year old  Date of Admission:  3/3/2018    Assessment & Plan   Jayro Elder is a 67 year old male who presented to the [facility] ER after syncope.  He reports 2 days of feeling tired with reduced appetite.  He also has developed spreading erythema up his left leg.    1.  LLE cellulitis that appears to originate near left foot chronic wound:  -  Patient diabetic and presented with syncope and episodes of near syncope.  I think he has early sepsis.  His wound, diabetic status, coupled with the quick development of spreading erythema today is concerning and I agreed with broadening the antibiotics from ancef which was initially started.  He was given zosyn and vancomycin.   I have requested blood cultures though it should be noted they were not done before the first abx.    -  Podiatry consult for the foot wound  -  Consider ID consult if worsens/not improving  -  Further IVF Hydration planned  -  Low dose oxycodone PRN, try tylenol first for pain    2.  Syncope:  -  He had one episode of syncope in the bathroom but other episodes of near syncope.  I believe these are infection/sepsis related.   He also has atrial fibrillation with some RVR that may be involved.  I will monitor him on telemetry.  I will hold off on an echo as I think #1 is the likely underlying cause.  -  Denies any major injuries.  CT without internal bleeding or acute fractures noted.  -  Doubt rhabdo but will check CK given history    3.  Chronic afib with acute RVR:  -  Mild RVR in 110's in the ER.  He has not taken any meds today as he felt unwell.  I will give him a small dose of his metoprolol BID.  I am starting low dose as I want to see how his BP responds.  It is normal range now but it should be noted it is that way with him not taking any BP meds today or yesterday.  -  Resume eliquis    4.  DM:  -   "Uncontrolled now with a history of poor control, he did not take insulin today or yesterday.  Resume lantus partial dose tonight until we see how his intake is.  I also added SSI.    5.  CAD with prior stents last in 6/2017, hyperlipidemia:  -  Off plavix, eliquis yesterday and today as he didn't feel well and take any meds today.  I have resumed them now.    -  Resume metoprolol at a low dose initially until we see how his BP handles the med  -  Statin    6.  Hypothyroidism:  -  Levothyroxine    7.  Reflux:   -  PPI    8.  Prior CVA:  -  Anticoag to resume as noted above    DVT Prophylaxis: Eliquis  Code Status: Full Code    Disposition: Expected discharge in 2-3 days once infection improves.    Primary Care Physician   Davion Cordova    Chief Complaint   \"I passed out\"    History is obtained from the patient.  I also spoke with the ER provider about the history.     History of Present Illness   Jayro Elder is a 67 year old male who presents following a syncopal episode at home.  He has been feeling vaguely unwell for 2 days.  This includes fatigue, poor appetite, some nausea with an episode of emesis, chills, and dizziness.  He did not take most his medications yesterday or any today as he felt \"too tired\" to take them.  He got up to got to the bathroom and had syncope.  He is not sure how long he was down but it was likely under 30 mins per his report.  He also had a couple other episodes of near syncope.  He denies chest pain, cough, diarrhea.  He noticed today progressive erythema of the LLE.  He has had a wound for a year on the left foot that has been receiving wound care.    Past Medical History    Past Medical History:   Diagnosis Date     CAD (coronary artery disease) 6/29/05    anterior MI,  PTCA and stent placed in mid LAD     Cardiomyopathy (H)      Essential hypertension, benign 11/13/2002     Generalized osteoarthrosis, unspecified site 11/13/2002     Myocardial infarction      Neuropathy  "     Tobacco use disorder 11/13/2002     Type II or unspecified type diabetes mellitus without mention of complication, not stated as uncontrolled 7/14/2005         Past Surgical History   Past Surgical History:   Procedure Laterality Date     ANGIOGRAM  6/29/05    subtotal occ.mid LAD,SUSAN mid LAD     ANGIOGRAM  7/05    mild CAD,patent stent,no flow-limiting lesions,sev.LV dysf.LAD enlarged     ANGIOGRAM  2/09    Sev.single vessel disease,Mod LV dysf.distal LAD 90%,70-75% mid lad just before prev stent,SUSAN to prox.mid LAD, endeavor to distal LAD     ANGIOGRAM  11/13/13    restenosis, stent LAD     C NONSPECIFIC PROCEDURE      Laminectomy x 3 - (1983 x 2 & 1990)     C NONSPECIFIC PROCEDURE Bilateral 1998    Bunionectomy     C NONSPECIFIC PROCEDURE  1959    Gingival surgery at age 9     HEART CATH LEFT HEART CATH  06/13/2017    SUSAN to OM3       Prior to Admission Medications   Prior to Admission Medications   Prescriptions Last Dose Informant Patient Reported? Taking?   Cholecalciferol (D3 ADULT PO) 3/1/2018 at PM Self Yes No   Sig: Take 2,000 Units by mouth every evening    amLODIPine (NORVASC) 5 MG tablet 3/1/2018 at AM  No Yes   Sig: Take 1 tablet (5 mg) by mouth daily   apixaban ANTICOAGULANT (ELIQUIS) 5 MG tablet 3/1/2018 at PM  Yes No   Sig: Take by mouth 2 times daily   clopidogrel (PLAVIX) 75 MG tablet 3/1/2018 at AM  No Yes   Sig: Take 1 tablet (75 mg) by mouth daily   insulin glargine (LANTUS) 100 UNIT/ML injection 3/1/2018 at HS  Yes No   Sig: Inject 44 Units Subcutaneous At Bedtime Daily at 1700    isosorbide mononitrate (IMDUR) 30 MG 24 hr tablet 3/1/2018 at M Self No No   Sig: Take 1 tablet (30 mg) by mouth daily   levothyroxine (SYNTHROID, LEVOTHROID) 137 MCG tablet 3/1/2018 at HS Self Yes No   Sig: Take 137 mcg by mouth At Bedtime    lisinopril (PRINIVIL/ZESTRIL) 20 MG tablet 3/1/2018 at PM  No No   Sig: Take 1 tablet (20 mg) by mouth 2 times daily   metFORMIN (GLUCOPHAGE) 1000 MG tablet 3/1/2018 at PM  Self No No   Sig: Take 1 tablet (1,000 mg) by mouth 2 times daily (with meals)   metoprolol (TOPROL-XL) 100 MG 24 hr tablet 3/1/2018 at HS Self No No   Sig: Take 1 tablet (100 mg) by mouth At Bedtime   nitroglycerin (NITROSTAT) 0.4 MG sublingual tablet  Self No Yes   Sig: Place 1 tablet (0.4 mg) under the tongue every 5 minutes as needed for chest pain   pantoprazole (PROTONIX) 40 MG enteric coated tablet 3/1/2018 at AM Self No No   Sig: Take 1 tablet (40 mg) by mouth every morning (before breakfast)   simvastatin (ZOCOR) 40 MG tablet 3/1/2018 at HS Self No No   Sig: Take 1 tablet (40 mg) by mouth At Bedtime      Facility-Administered Medications: None     Allergies   Allergies   Allergen Reactions     No Known Allergies        Social History   Social History   Substance Use Topics     Smoking status: Former Smoker     Packs/day: 1.00     Years: 25.00     Types: Cigarettes     Quit date: 7/25/2005     Smokeless tobacco: Never Used     Alcohol use 2.4 oz/week     4 Shots of liquor per week   (No alcohol past couple days)  , accompanied tonight by his wife.    Family History     Family History   Problem Relation Age of Onset     Cancer - colorectal Sister      Thyroid Disease Brother      Cardiovascular Brother      DIABETES Brother      CANCER Father      tumor in chest and throat     Arthritis Mother      Thyroid Disease Mother      DIABETES Mother      Glaucoma No family hx of      Macular Degeneration No family hx of        Review of Systems   A Comprehensive greater than 10 system review of systems was carried out.  Pertinent positives and negatives are noted above.  Otherwise negative for contributory information.    Physical Exam   Temp: 98.3  F (36.8  C) Temp src: Oral BP: (!) 137/94   Heart Rate: 97 Resp: 18 SpO2: 97 % O2 Device: None (Room air)    Vital Signs with Ranges  Temp:  [98.3  F (36.8  C)] 98.3  F (36.8  C)  Heart Rate:  [] 97  Resp:  [18] 18  BP: (129-142)/(68-94) 137/94  SpO2:  [93  %-98 %] 97 %  240 lbs 0 oz    GEN:  Alert, oriented x 3, appears ill but comfortable.  HEENT:  Normocephalic/atraumatic, no scleral icterus, no nasal discharge, mouth moist.  CV:  Irreg Irreg with rate around 100-110 during my exam.  No loud murmur or rub noted.  LUNGS:  Clear to auscultation bilaterally without rales/rhonchi/wheezing/retractions.  Symmetric chest rise on inhalation noted.  ABD:  Active bowel sounds, soft, non-tender/non-distended.  No rebound/guarding/rigidity.  EXT/SKIN:  Trace left ankle edema.  No cyanosis. Skin dry to touch.  Erythema extensively over left pretibial region that is warm to the touch.  It involves the ankle and there is a fine erythemic line extending down to the wound area on close exam.  Left foot wound without overt discharge noted.              NEURO:  Symmetric muscle strength, Sensation decreased feet which is a chronic problem for him.  No new focal deficits appreciated.    # Pain Assessment:   Current Pain Score 3/1/2018 6/17/2017 6/17/2017   Patient currently in pain? yes denies yes   Pain score (0-10) - - -   Pain location - - Other (Comment)   Pain descriptors - - Aching   - Jayro is experiencing pain due to left leg cellulitis. Pain management was discussed and the plan was created in a collaborative fashion.  Jayro's response to the current recommendations: compliant  - Please see the plan for pain management as documented above    Data   Data reviewed today:  I personally reviewed the EKG tracing showing afib with RVR.      Recent Labs  Lab 03/03/18  1450   WBC 12.3*   HGB 13.8   HCT 40.3   MCV 84          Recent Labs  Lab 03/03/18  1450   *   POTASSIUM 3.5   CHLORIDE 97   CO2 26   ANIONGAP 8   *   BUN 18   CR 0.92   GFRESTIMATED 82   GFRESTBLACK >90   BETTIE 9.1       Recent Labs  Lab 03/03/18  1450   TROPI <0.015       Recent Results (from the past 24 hour(s))   CT Head w/o Contrast    Narrative    CT SCAN OF THE HEAD WITHOUT CONTRAST   3/3/2018  4:20 PM     HISTORY: Fall on Eliquis yesterday, headache, evaluate for ICH.      TECHNIQUE:  Axial images of the head and coronal reformations without  IV contrast material. Radiation dose for this scan was reduced using  automated exposure control, adjustment of the mA and/or kV according  to patient size, or iterative reconstruction technique.    COMPARISON: Head CT 4/22/2017.    FINDINGS: Area of encephalomalacia and gliosis in the paramedian left  occipital lobe compatible with chronic infarct corresponds to the  hemorrhagic infarct seen on prior CT. There is no evidence of new  intracranial hemorrhage. No mass effect or midline shift. Ventricular  size within normal limits without hydrocephalus. Mild diffuse  parenchymal volume loss. Patchy periventricular white matter  hypodensities appear similar to prior and while nonspecific are likely  due to chronic microvascular ischemic disease.    Mucosal thickening within the right ethmoid air cells particularly  along the posterior aspect. The bony calvarium and bones of the skull  base appear intact.       Impression    IMPRESSION:   1. No evidence of acute intracranial hemorrhage, mass, or herniation.  2. Sequela of chronic left occipital lobe infarct. Previously seen  hemorrhagic component in this area of infarct from CT 4/22/2017 has  resolved.  3. Diffuse parenchymal volume loss and white matter changes likely due  to chronic microvascular ischemic disease.      DAMARI CHAND MD   CT Chest Pulmonary Embolism w Contrast    Narrative    CT CHEST PULMONARY EMBOLISM WITH CONTRAST   3/3/2018 4:21 PM     HISTORY: Syncope yesterday, left rib pain, left leg swelling, evaluate  for PE, rib fractures.    TECHNIQUE:  79mL Isovue-370. Radiation dose for this scan was reduced  using automated exposure control, adjustment of the mA and/or kV  according to patient size, or iterative reconstruction technique.    COMPARISON: Chest CT from 4/24/2017.    FINDINGS:  No evidence of  pulmonary embolism. No evidence of aortic  aneurysm or dissection. No mediastinal or hilar adenopathy. Previously  seen right pleural effusion has resolved. Lungs are clear. There are  old fractures of the left fifth, sixth and seventh ribs  posterolaterally. No evidence of acute fracture. No pneumothorax or  pleural fluid.    Included portions of the upper abdomen demonstrate enlargement of the  right adrenal gland which is stable. There is some fat signal  intensity in this area and this likely represents an adrenal adenoma.  It is similar in size when compared to the prior study measuring up to  2.2 cm in diameter.      Impression    IMPRESSION:  1. No evidence of pulmonary embolism. No evidence of aortic aneurysm  or dissection.  2. Previously seen right pleural effusion has resolved. Lungs are  clear.  3. 2.2 cm nodular density with some fat signal intensity within it  likely representing an adrenal adenoma. No significant change.     US Lower Extremity Venous Duplex Left    Narrative    ULTRASOUND VENOUS LOWER EXTREMITY UNILATERAL LEFT  3/3/2018 4:34 PM     HISTORY: left leg swelling, erythema, eval for DVT, see above;     COMPARISON: None.    TECHNIQUE: Ultrasound gray scale, Color Doppler flow, and spectral  Doppler waveform analysis performed.    FINDINGS: The left common femoral, superficial femoral, popliteal and  posterior tibial veins are patent and fully compressible and  demonstrate normal venous Doppler flow.      Impression    IMPRESSION: No DVT demonstrated.    ELIZA ALEXANDER MD

## 2018-03-04 NOTE — PLAN OF CARE
Problem: Patient Care Overview  Goal: Plan of Care/Patient Progress Review  Outcome: No Change  [id number]: Pt resting comfortably. Slept on and off during the night. VSS. Denies pain. Triggered sepsis lactic- 1.4. LLE red, outlined- not exceeding marked line. . Transfers with Ax1-2, c/o lightheaded/dizziness on occasion. Plan for podiatry consult.- wound CDI. On vanco and zosyn. Tele reads Afib CVR BBB. Will continue to monitor.

## 2018-03-04 NOTE — PROGRESS NOTES
[facility]  Hospitalist Progress Note  Emery Eckert MD 03/04/18    Reason for Stay (Diagnosis): Left lower extremity cellulitis         Assessment and Plan:      Summary of Stay: Jayro Elder is a 67 year old male with history of A. fib on Eliquis, IDDM, CAD status post stents, HLD, CVA, hypothyroidism, GERD, and a chronic left plantar foot wound over the last 1 year who was admitted on 3/3/2018 with left leg swelling and redness consistent with cellulitis in addition to syncope while in the bathroom.  He had a leukocytosis to 12.3, but is afebrile.  Slight tachycardia but no hypertension.  CRP significantly elevated.  He was started on vancomycin and Zosyn for broad coverage given his associated left foot wound in setting of diabetes.  Podiatry has been consulted and has requested MRI of the foot and MASON/ultrasound of the lower extremity.  As far as his syncope goes this was an episode in the bathroom associated with dizziness, nausea, vomiting likely due to his acute infection and mild hypovolemia.    Problem List/Assessment and Plan:   LLE cellulitis in setting of chronic L plantar foot wound: Wound present on left plantar foot near first MPJ joint for the past 1 year.  Now with erythematous, swelling, pain of the left lower leg consistent with acute cellulitis.  .  Has leukocytosis mild tachycardia but does not appear septic.  History of diabetes and the foot wound he was started on Vancomycin and Zosyn.  -Continue vancomycin and Zosyn  -Podiatry consulted, appreciate recs.  MRI foot and MASON's to be obtained  -Follow blood cultures  -Likely will involve infectious disease at some point  -Tylenol and low-dose oxycodone as needed for pain.    Syncope: One episode of syncope while in the bathroom.  No prodromal lightheadedness but was dizzy afterwards.  Did hit his nose but no sign of significant injury.  He did not have head CT, but is on Eliquis.  No focal neuro deficit or symptoms to  suggest acute intracranial bleeding.  Suspect this was related to hypovolemia in setting of acute infection versus vasovagal as often seen in the bathroom.  He has A. fib but no other history of arrhythmia.  -Telemetry  -If an ongoing issue would obtain TTE at that time    Chronic A. fib: Acute RVR with heart rate 110-120.  PT on Eliquis and metoprolol  mg at bedtime.  Metoprolol was given as lower dose tartrate in the morning due to acute infection.  -Gave 50 mg p.o. metoprolol tartrate this morning, then will restart Toprol-XL at 100 mg at bedtime for his home regimen  -Continue Eliquis for now until determination if surgical exploration of wound will be needed  -Telemetry    CAD: History of stents with last 6/2017.  PTA on Plavix and Eliquis along with statin.  No chest pain.  -Resume Plavix, Eliquis, statin, BB    HLD: Resume statin    IDDM: PTA on Lantus 44 units at bedtime and Metformin 1000 mg twice daily.  Due to acute infection he was given half dose Lantus on admission.  A1c is 8 point.  -Increase Lantus to 30 units at bedtime this evening  -Medium dose sliding scale insulin    Hypothyroidism: Sinew PTA levothyroxine    GERD: Continue PTA PPI    History CVA: Continue Eliquis and Plavix      # Pain Assessment:   Current Pain Score 3/4/2018 3/4/2018 3/4/2018   Patient currently in pain? yes denies denies   Pain score (0-10) - - -   Pain location Leg - -   Pain descriptors - - -   - Jayro is experiencing pain due to left leg cellulitis. Pain management was discussed and the plan was created in a collaborative fashion.  Jayro's response to the current recommendations: engaged  - Please see the plan for pain management as documented above    DVT Prophylaxis: Eliquis  Code Status: Full Code  FEN: Consistent carb medium, stop IV fluid  Discharge Dispo: TBD  Estimated Disch Date / # of Days until Disch: Likely likely minimal 2-3 days of IV antibiotics.        Interval History (Subjective):      Assumed care  "today.  Admitted overnight for left lower extremity cellulitis.  Has ongoing pain when the left leg is touched, but is okay if it is just resting on the bed.  Redness looks a little better to him.  Had some chills overnight, but denies fevers.                  Physical Exam:      Last Vital Signs:  /79  Pulse 116  Temp 96.4  F (35.8  C) (Oral)  Resp 20  Ht 1.93 m (6' 4\")  Wt 109.6 kg (241 lb 9.6 oz)  SpO2 97%  BMI 29.41 kg/m2      Intake/Output Summary (Last 24 hours) at 03/04/18 1416  Last data filed at 03/04/18 0800   Gross per 24 hour   Intake              490 ml   Output                0 ml   Net              490 ml       Constitutional: Awake, NAD  Eyes: sclera white   HEENT:  MMM  Respiratory:   lungs cta bilaterally, no crackles or wheeze  Cardiovascular:    GI: non-tender, not distended, bowel sounds present  Skin: dark area of red to L anterior shin with then surrounding lighter erythema above and below, but well inside pen marking.    Musculoskeletal/extremities: 2+LE edema of the left leg, trace to 1+ on R leg.  Plantar wound to 1st MPJ joint  Neurologic: A&O   Psychiatric: calm, cooperative          Medications:      All current medications were reviewed with changes reflected in problem list.         Data:      All new lab and imaging data was reviewed.   Labs:    Recent Labs  Lab 03/03/18  1936 03/03/18  1843   CULT No growth after 9 hours No growth after 9 hours       Recent Labs  Lab 03/04/18  0637 03/03/18  1450    131*   POTASSIUM 3.4 3.5   CHLORIDE 101 97   CO2 26 26   ANIONGAP 7 8   * 256*   BUN 13 18   CR 0.92 0.92   GFRESTIMATED 82 82   GFRESTBLACK >90 >90   BETTIE 8.0* 9.1       Recent Labs  Lab 03/04/18  0637 03/03/18  1450   WBC 9.8 12.3*   HGB 12.7* 13.8   HCT 37.5* 40.3   MCV 85 84    196       Recent Labs  Lab 03/04/18  0637   SED 17   .0*      Imaging:   Recent Results (from the past 24 hour(s))   CT Head w/o Contrast    Narrative    CT SCAN OF THE " HEAD WITHOUT CONTRAST   3/3/2018 4:20 PM     HISTORY: Fall on Eliquis yesterday, headache, evaluate for ICH.      TECHNIQUE:  Axial images of the head and coronal reformations without  IV contrast material. Radiation dose for this scan was reduced using  automated exposure control, adjustment of the mA and/or kV according  to patient size, or iterative reconstruction technique.    COMPARISON: Head CT 4/22/2017.    FINDINGS: Area of encephalomalacia and gliosis in the paramedian left  occipital lobe compatible with chronic infarct corresponds to the  hemorrhagic infarct seen on prior CT. There is no evidence of new  intracranial hemorrhage. No mass effect or midline shift. Ventricular  size within normal limits without hydrocephalus. Mild diffuse  parenchymal volume loss. Patchy periventricular white matter  hypodensities appear similar to prior and while nonspecific are likely  due to chronic microvascular ischemic disease.    Mucosal thickening within the right ethmoid air cells particularly  along the posterior aspect. The bony calvarium and bones of the skull  base appear intact.       Impression    IMPRESSION:   1. No evidence of acute intracranial hemorrhage, mass, or herniation.  2. Sequela of chronic left occipital lobe infarct. Previously seen  hemorrhagic component in this area of infarct from CT 4/22/2017 has  resolved.  3. Diffuse parenchymal volume loss and white matter changes likely due  to chronic microvascular ischemic disease.      DAMARI CHAND MD   CT Chest Pulmonary Embolism w Contrast    Narrative    CT CHEST PULMONARY EMBOLISM WITH CONTRAST   3/3/2018 4:21 PM     HISTORY: Syncope yesterday, left rib pain, left leg swelling, evaluate  for PE, rib fractures.    TECHNIQUE:  79mL Isovue-370. Radiation dose for this scan was reduced  using automated exposure control, adjustment of the mA and/or kV  according to patient size, or iterative reconstruction technique.    COMPARISON: Chest CT from  4/24/2017.    FINDINGS:  No evidence of pulmonary embolism. No evidence of aortic  aneurysm or dissection. No mediastinal or hilar adenopathy. Previously  seen right pleural effusion has resolved. Lungs are clear. There are  old fractures of the left fifth, sixth and seventh ribs  posterolaterally. No evidence of acute fracture. No pneumothorax or  pleural fluid.    Included portions of the upper abdomen demonstrate enlargement of the  right adrenal gland which is stable. There is some fat signal  intensity in this area and this likely represents an adrenal adenoma.  It is similar in size when compared to the prior study measuring up to  2.2 cm in diameter.      Impression    IMPRESSION:  1. No evidence of pulmonary embolism. No evidence of aortic aneurysm  or dissection.  2. Previously seen right pleural effusion has resolved. Lungs are  clear.  3. 2.2 cm nodular density with some fat signal intensity within it  likely representing an adrenal adenoma. No significant change.      ELIZA ALEXANDER MD   US Lower Extremity Venous Duplex Left    Narrative    ULTRASOUND VENOUS LOWER EXTREMITY UNILATERAL LEFT  3/3/2018 4:34 PM     HISTORY: left leg swelling, erythema, eval for DVT, see above;     COMPARISON: None.    TECHNIQUE: Ultrasound gray scale, Color Doppler flow, and spectral  Doppler waveform analysis performed.    FINDINGS: The left common femoral, superficial femoral, popliteal and  posterior tibial veins are patent and fully compressible and  demonstrate normal venous Doppler flow.      Impression    IMPRESSION: No DVT demonstrated.    ELIZA ALEXANDER MD   XR Foot Left 2 Views    Narrative    FOOT TWO VIEWS LEFT  3/4/2018 11:44 AM     HISTORY: ulcer plantar 1st MPJ with impressive leg cellulitis; assess  for osteo/gas;     COMPARISON: None.    FINDINGS: There are degenerative changes in the first medical  tarsophalangeal joint. There is loss of joint space. Subchondral  sclerosis. No bone destruction is identified. No  periosteal reactions  are seen. Degenerative changes are also seen in the midfoot region. A  spurs seen off the plantar aspect of the calcaneus..  No soft tissue  gas is identified.      Impression    IMPRESSION:   1. Degenerative changes.  2. No imaging evidence for osteomyelitis. No soft tissue gas is  identified.    MD Emery WARD MD

## 2018-03-04 NOTE — PHARMACY-VANCOMYCIN DOSING SERVICE
Pharmacy Vancomycin Initial Note  Date of Service March 3, 2018  Patient's  1950  67 year old, male    Indication: Skin and Soft Tissue Infection    Current estimated CrCl = Estimated Creatinine Clearance: 105.7 mL/min (based on Cr of 0.92).    Creatinine for last 3 days  3/3/2018:  2:50 PM Creatinine 0.92 mg/dL    Recent Vancomycin Level(s) for last 3 days  No results found for requested labs within last 72 hours.      Vancomycin IV Administrations (past 72 hours)      No vancomycin orders with administrations in past 72 hours.                Nephrotoxins and other renal medications (Future)    Start     Dose/Rate Route Frequency Ordered Stop    18 0000  piperacillin-tazobactam (ZOSYN) infusion 3.375 g      3.375 g  100 mL/hr over 30 Minutes Intravenous EVERY 8 HOURS SCHEDULED 18 1926      18  vancomycin (VANCOCIN) 1500 mg in 0.9% NaCl 250 mL PREMIX      1,500 mg  166.7 mL/hr over 90 Minutes Intravenous EVERY 12 HOURS 18            Contrast Orders - past 72 hours (72h ago through future)    Start     Dose/Rate Route Frequency Ordered Stop    18 1611  iopamidol (ISOVUE-370) solution 500 mL      500 mL Intravenous ONCE 18 1610 18 1612                Plan:  1.  Start vancomycin  1500 mg IV q12h.   2.  Goal Trough Level: 10-15 mg/L   3.  Pharmacy will check trough levels as appropriate in 1-3 Days.    4. Serum creatinine levels will be ordered a minimum of twice weekly.    5. [place] method utilized to dose vancomycin therapy: Method 1    Danilo Del Real

## 2018-03-04 NOTE — PLAN OF CARE
Problem: Patient Care Overview  Goal: Plan of Care/Patient Progress Review  Outcome: Improving  RN - VSS. A&O. Tele Afib with CVR and BBB. Pt c/o back and foot pain - only utilizing ordered tylenol for relief. L Leg cellulitis improving - leg remains warm and tender. Sensation and CMS intact. L foot wound dressed with foam dressing. Pt receiving L foot imaging and u/s. Continues with IV abx. Tolerating PO intake with fair to poor appetite. Up with SBA to assist of 1 to bathroom - voiding x2. Pt reporting increase in strength and activity tolerance. BG slightly elevated - Pt received decreased lantus dose from previous night. Pt expecting ortho shoe - current size unavailable - will be delivered tomorrow per SPD.

## 2018-03-04 NOTE — CONSULTS
"Foot & Ankle Surgery  March 4, 2018    CC: diabetic left foot wound/infection    I was asked to see Jayro Elder regarding the chief complaint by:  Dr. YUSUF Baker    HPI:  Pt is a 67 year old male who presents with above complaint.  \"A year or so\" history of ulcer plantar L 1st MPJ with recent history of worsening erythema/edema in leg and pain in the foot.  He has seen Dr Garcia in our department for wounds, last visit 6/28/16, at which point the wound plantar 1st MPJ was noted to be healed.  He's been using a topical cream, \"I don't remember what it's called\", states he just got the ointment recently.  He's used bacitracin in the past.  No admission imaging of inflammatory markers.  Admission A1c 8.8.  Last imaging of the left foot was an MRI November 2017, which showed some marrow changes to the 1st MPJ, felt to be more degenerative in nature and not convincing for osteomyelitis.  He states the left leg, which is quite edematous, erythematous and warm, usually appears similar to the right leg, which shows no color changes.     ROS:   Pos for CC.  The patient denies current nausea, vomiting, chills, fevers, belly pain, calf pain, chest pain or SOB.  Complete remainder of ROS is otherwise neg.    VITALS:    Vitals:    03/04/18 0032 03/04/18 0100 03/04/18 0408 03/04/18 0740   BP: 108/53 131/72 148/79 137/79   Pulse:    116   Resp: 18 20 20 20   Temp: 95.7  F (35.4  C) 95.2  F (35.1  C) 96.9  F (36.1  C) 96.4  F (35.8  C)   TempSrc: Oral Oral Oral Oral   SpO2: 92% 96% 94% 97%   Weight:       Height:           PMH:    Past Medical History:   Diagnosis Date     CAD (coronary artery disease) 6/29/05    anterior MI,  PTCA and stent placed in mid LAD     Cardiomyopathy (H)      Essential hypertension, benign 11/13/2002     Generalized osteoarthrosis, unspecified site 11/13/2002     Myocardial infarction      Neuropathy      Tobacco use disorder 11/13/2002     Type II or unspecified type diabetes mellitus without " mention of complication, not stated as uncontrolled 7/14/2005       SXHX:    Past Surgical History:   Procedure Laterality Date     ANGIOGRAM  6/29/05    subtotal occ.mid LAD,SUSAN mid LAD     ANGIOGRAM  7/05    mild CAD,patent stent,no flow-limiting lesions,sev.LV dysf.LAD enlarged     ANGIOGRAM  2/09    Sev.single vessel disease,Mod LV dysf.distal LAD 90%,70-75% mid lad just before prev stent,SUSAN to prox.mid LAD, endeavor to distal LAD     ANGIOGRAM  11/13/13    restenosis, stent LAD     C NONSPECIFIC PROCEDURE      Laminectomy x 3 - (1983 x 2 & 1990)     C NONSPECIFIC PROCEDURE Bilateral 1998    Bunionectomy     C NONSPECIFIC PROCEDURE  1959    Gingival surgery at age 9     HEART CATH LEFT HEART CATH  06/13/2017    SUSAN to OM3        MEDS:    Current Facility-Administered Medications   Medication     metoprolol tartrate (LOPRESSOR) tablet 50 mg     piperacillin-tazobactam (ZOSYN) infusion 3.375 g     apixaban ANTICOAGULANT (ELIQUIS) tablet 5 mg     insulin glargine (LANTUS) injection 20 Units     isosorbide mononitrate (IMDUR) 24 hr tablet 30 mg     levothyroxine (SYNTHROID/LEVOTHROID) tablet 137 mcg     pantoprazole (PROTONIX) EC tablet 40 mg     simvastatin (ZOCOR) tablet 40 mg     glucose 40 % gel 15-30 g    Or     dextrose 50 % injection 25-50 mL    Or     glucagon injection 1 mg     naloxone (NARCAN) injection 0.1-0.4 mg     melatonin tablet 1 mg     insulin aspart (NovoLOG) inj (RAPID ACTING)     insulin aspart (NovoLOG) inj (RAPID ACTING)     Patient is already receiving anticoagulation with heparin, enoxaparin (LOVENOX), warfarin (COUMADIN)  or other anticoagulant medication     sodium chloride 0.9% infusion     potassium chloride SA (K-DUR/KLOR-CON M) CR tablet 20-40 mEq     potassium chloride (KLOR-CON) Packet 20-40 mEq     potassium chloride 10 mEq in 100 mL sterile water intermittent infusion (premix)     potassium chloride 10 mEq in 100 mL intermittent infusion with 10 mg lidocaine     potassium  chloride 20 mEq in 50 mL intermittent infusion     acetaminophen (TYLENOL) tablet 650 mg     oxyCODONE IR (ROXICODONE) half-tab 2.5 mg     ondansetron (ZOFRAN-ODT) ODT tab 4 mg    Or     ondansetron (ZOFRAN) injection 4 mg     clopidogrel (PLAVIX) tablet 75 mg     vancomycin (VANCOCIN) 1500 mg in 0.9% NaCl 250 mL PREMIX       ALL:     Allergies   Allergen Reactions     No Known Allergies        FMH:    Family History   Problem Relation Age of Onset     Cancer - colorectal Sister      Thyroid Disease Brother      Cardiovascular Brother      DIABETES Brother      CANCER Father      tumor in chest and throat     Arthritis Mother      Thyroid Disease Mother      DIABETES Mother      Glaucoma No family hx of      Macular Degeneration No family hx of        SocHx:    Social History     Social History     Marital status:      Spouse name: N/A     Number of children: N/A     Years of education: N/A     Occupational History     Not on file.     Social History Main Topics     Smoking status: Former Smoker     Packs/day: 1.00     Years: 25.00     Types: Cigarettes     Quit date: 7/25/2005     Smokeless tobacco: Never Used     Alcohol use 2.4 oz/week     4 Shots of liquor per week     Drug use: No     Sexual activity: Yes     Partners: Female     Other Topics Concern     Parent/Sibling W/ Cabg, Mi Or Angioplasty Before 65f 55m? Yes     Caffeine Concern No     3 cups daily     Special Diet Yes     watching carb intake     Exercise No     some walking     Social History Narrative           EXAMINATION:  Gen:   No apparent distress  Neuro:   A&Ox3, no deficits  Psych:    Answering questions appropriately for age and situation with normal affect  Head:    NCAT  Eye:    Visual scanning without deficit  Ear:    Response to auditory stimuli wnl  Lung:    Non-labored breathing on RA noted  Abd:    NTND per patient report  Lymph:    Moderate edema left lower extremity   Vasc:    Pulses not readily palpable, CFT minimally  delayed  Neuro:    Light touch sensation diminished at the level of the digits  Derm:    Blood blister plantar-medial L 1st MPJ, full-thickness, soft tissue covering bone, but there's a hard end-feel with exploration of the wound.  Generalized low-grade callusing/dried blood around plantar and medial L 1st MPJ.   MSK:    Left lower extremity recurrent bunion with mild hammering of digits without acute deformity.  No pain with PROM L 1st MPJ.  Calf:    Neg for tenderness      Assessment:  67 year old male with diabetic wound left foot with impressive left lower leg cellulitis       Plan:  Discussed etiologies, anatomy and options  1.  Diabetic left foot infection with left lower extremity cellulitis in setting of PAD  -xrays/MRI ordered to assess for osteo/abscess  -CRP, ESR ordered  -non-invasive vascular studies to get initial assessment of distal flow to foot  -surgical shoe for ambulation  -Dr Mckeon to follow up on studies/labs to determine extent of infection and next step    Patient's medical history was reviewed today    Ryan Bhagat DPM   Podiatric Foot & Ankle Surgeon  [facility]  [phone]

## 2018-03-04 NOTE — PHARMACY-ADMISSION MEDICATION HISTORY
Admission medication history interview status for this patient is complete. See [facility] admission navigator for allergy information, prior to admission medications and immunization status.     Medication history interview source(s):Patient  Medication history resources (including written lists, pill bottles, clinic record):None  Primary pharmacy: Not ID'd    Changes made to PTA medication list:  Added: None  Deleted: Albuterol MDI  Changed: Lantus    Actions taken by pharmacist (provider contacted, etc):None     Additional medication history information:None    Medication reconciliation/reorder completed by provider prior to medication history? No    Prior to Admission medications    Medication Sig Last Dose Taking? Auth Provider   clopidogrel (PLAVIX) 75 MG tablet Take 1 tablet (75 mg) by mouth daily 3/1/2018 at AM Yes Johnathan Mckeon MD   amLODIPine (NORVASC) 5 MG tablet Take 1 tablet (5 mg) by mouth daily 3/1/2018 at AM Yes Johnathan Mckeon MD   nitroglycerin (NITROSTAT) 0.4 MG sublingual tablet Place 1 tablet (0.4 mg) under the tongue every 5 minutes as needed for chest pain  Yes Davion Cordova PA-C   lisinopril (PRINIVIL/ZESTRIL) 20 MG tablet Take 1 tablet (20 mg) by mouth 2 times daily 3/1/2018 at PM  Johnathan Mckeon MD   apixaban ANTICOAGULANT (ELIQUIS) 5 MG tablet Take by mouth 2 times daily 3/1/2018 at PM  Reported, Patient   insulin glargine (LANTUS) 100 UNIT/ML injection Inject 44 Units Subcutaneous At Bedtime Daily at 1700  3/1/2018 at HS  Unknown, Entered By History   Cholecalciferol (D3 ADULT PO) Take 2,000 Units by mouth every evening  3/1/2018 at PM  Reported, Patient   isosorbide mononitrate (IMDUR) 30 MG 24 hr tablet Take 1 tablet (30 mg) by mouth daily 3/1/2018 at M  Anni Bradley APRN CNP   simvastatin (ZOCOR) 40 MG tablet Take 1 tablet (40 mg) by mouth At Bedtime 3/1/2018 at HS  Johnathan Mckeon MD   metoprolol (TOPROL-XL) 100 MG 24 hr tablet Take 1 tablet (100 mg) by mouth  At Bedtime 3/1/2018 at HS  Johnathan Mckeon MD   pantoprazole (PROTONIX) 40 MG enteric coated tablet Take 1 tablet (40 mg) by mouth every morning (before breakfast) 3/1/2018 at AM  Ana Frankel MD   metFORMIN (GLUCOPHAGE) 1000 MG tablet Take 1 tablet (1,000 mg) by mouth 2 times daily (with meals) 3/1/2018 at PM  Ana Frankel MD   levothyroxine (SYNTHROID, LEVOTHROID) 137 MCG tablet Take 137 mcg by mouth At Bedtime  3/1/2018 at HS  Henrry Cheney PA-C

## 2018-03-05 LAB
ANION GAP SERPL CALCULATED.3IONS-SCNC: 6 MMOL/L (ref 3–14)
BASOPHILS # BLD AUTO: 0 10E9/L (ref 0–0.2)
BASOPHILS NFR BLD AUTO: 0.3 %
BUN SERPL-MCNC: 11 MG/DL (ref 7–30)
CALCIUM SERPL-MCNC: 8.3 MG/DL (ref 8.5–10.1)
CHLORIDE SERPL-SCNC: 103 MMOL/L (ref 94–109)
CO2 SERPL-SCNC: 27 MMOL/L (ref 20–32)
CREAT SERPL-MCNC: 0.84 MG/DL (ref 0.66–1.25)
CRP SERPL-MCNC: 111 MG/L (ref 0–8)
DIFFERENTIAL METHOD BLD: ABNORMAL
EOSINOPHIL # BLD AUTO: 0.2 10E9/L (ref 0–0.7)
EOSINOPHIL NFR BLD AUTO: 2.3 %
ERYTHROCYTE [DISTWIDTH] IN BLOOD BY AUTOMATED COUNT: 13.1 % (ref 10–15)
GFR SERPL CREATININE-BSD FRML MDRD: >90 ML/MIN/1.7M2
GLUCOSE BLDC GLUCOMTR-MCNC: 201 MG/DL (ref 70–99)
GLUCOSE BLDC GLUCOMTR-MCNC: 223 MG/DL (ref 70–99)
GLUCOSE BLDC GLUCOMTR-MCNC: 277 MG/DL (ref 70–99)
GLUCOSE BLDC GLUCOMTR-MCNC: 277 MG/DL (ref 70–99)
GLUCOSE BLDC GLUCOMTR-MCNC: 305 MG/DL (ref 70–99)
GLUCOSE SERPL-MCNC: 191 MG/DL (ref 70–99)
HCT VFR BLD AUTO: 35.4 % (ref 40–53)
HGB BLD-MCNC: 11.9 G/DL (ref 13.3–17.7)
IMM GRANULOCYTES # BLD: 0 10E9/L (ref 0–0.4)
IMM GRANULOCYTES NFR BLD: 0.4 %
INTERPRETATION ECG - MUSE: NORMAL
LYMPHOCYTES # BLD AUTO: 0.8 10E9/L (ref 0.8–5.3)
LYMPHOCYTES NFR BLD AUTO: 11.7 %
MCH RBC QN AUTO: 28.9 PG (ref 26.5–33)
MCHC RBC AUTO-ENTMCNC: 33.6 G/DL (ref 31.5–36.5)
MCV RBC AUTO: 86 FL (ref 78–100)
MONOCYTES # BLD AUTO: 0.7 10E9/L (ref 0–1.3)
MONOCYTES NFR BLD AUTO: 10.1 %
NEUTROPHILS # BLD AUTO: 5.2 10E9/L (ref 1.6–8.3)
NEUTROPHILS NFR BLD AUTO: 75.2 %
NRBC # BLD AUTO: 0 10*3/UL
NRBC BLD AUTO-RTO: 0 /100
PLATELET # BLD AUTO: 173 10E9/L (ref 150–450)
POTASSIUM SERPL-SCNC: 3.6 MMOL/L (ref 3.4–5.3)
RBC # BLD AUTO: 4.12 10E12/L (ref 4.4–5.9)
SODIUM SERPL-SCNC: 136 MMOL/L (ref 133–144)
VANCOMYCIN SERPL-MCNC: 12.5 MG/L
WBC # BLD AUTO: 6.9 10E9/L (ref 4–11)

## 2018-03-05 PROCEDURE — 25000131 ZZH RX MED GY IP 250 OP 636 PS 637: Mod: GY | Performed by: INTERNAL MEDICINE

## 2018-03-05 PROCEDURE — 25000128 H RX IP 250 OP 636: Performed by: INTERNAL MEDICINE

## 2018-03-05 PROCEDURE — 36415 COLL VENOUS BLD VENIPUNCTURE: CPT | Performed by: PODIATRIST

## 2018-03-05 PROCEDURE — 99233 SBSQ HOSP IP/OBS HIGH 50: CPT | Performed by: INTERNAL MEDICINE

## 2018-03-05 PROCEDURE — 36415 COLL VENOUS BLD VENIPUNCTURE: CPT | Performed by: INTERNAL MEDICINE

## 2018-03-05 PROCEDURE — 12000007 ZZH R&B INTERMEDIATE

## 2018-03-05 PROCEDURE — A9270 NON-COVERED ITEM OR SERVICE: HCPCS | Mod: GY | Performed by: INTERNAL MEDICINE

## 2018-03-05 PROCEDURE — 80202 ASSAY OF VANCOMYCIN: CPT | Performed by: INTERNAL MEDICINE

## 2018-03-05 PROCEDURE — 80048 BASIC METABOLIC PNL TOTAL CA: CPT | Performed by: PODIATRIST

## 2018-03-05 PROCEDURE — 25000132 ZZH RX MED GY IP 250 OP 250 PS 637: Mod: GY | Performed by: INTERNAL MEDICINE

## 2018-03-05 PROCEDURE — 00000146 ZZHCL STATISTIC GLUCOSE BY METER IP

## 2018-03-05 PROCEDURE — 85025 COMPLETE CBC W/AUTO DIFF WBC: CPT | Performed by: PODIATRIST

## 2018-03-05 PROCEDURE — 86140 C-REACTIVE PROTEIN: CPT | Performed by: PODIATRIST

## 2018-03-05 PROCEDURE — 99231 SBSQ HOSP IP/OBS SF/LOW 25: CPT | Performed by: PODIATRIST

## 2018-03-05 RX ORDER — DEXTROSE MONOHYDRATE 25 G/50ML
25-50 INJECTION, SOLUTION INTRAVENOUS
Status: DISCONTINUED | OUTPATIENT
Start: 2018-03-05 | End: 2018-03-07 | Stop reason: HOSPADM

## 2018-03-05 RX ORDER — CEFAZOLIN SODIUM 2 G/100ML
2 INJECTION, SOLUTION INTRAVENOUS EVERY 8 HOURS
Status: DISCONTINUED | OUTPATIENT
Start: 2018-03-05 | End: 2018-03-07 | Stop reason: HOSPADM

## 2018-03-05 RX ORDER — NICOTINE POLACRILEX 4 MG
15-30 LOZENGE BUCCAL
Status: DISCONTINUED | OUTPATIENT
Start: 2018-03-05 | End: 2018-03-07 | Stop reason: HOSPADM

## 2018-03-05 RX ADMIN — ACETAMINOPHEN 650 MG: 325 TABLET, FILM COATED ORAL at 16:25

## 2018-03-05 RX ADMIN — LEVOTHYROXINE SODIUM 137 MCG: 112 TABLET ORAL at 20:53

## 2018-03-05 RX ADMIN — CEFAZOLIN SODIUM 2 G: 2 INJECTION, SOLUTION INTRAVENOUS at 16:24

## 2018-03-05 RX ADMIN — METOPROLOL SUCCINATE 100 MG: 100 TABLET, EXTENDED RELEASE ORAL at 20:53

## 2018-03-05 RX ADMIN — CEFAZOLIN SODIUM 2 G: 2 INJECTION, SOLUTION INTRAVENOUS at 23:52

## 2018-03-05 RX ADMIN — TAZOBACTAM SODIUM AND PIPERACILLIN SODIUM 3.38 G: 375; 3 INJECTION, SOLUTION INTRAVENOUS at 12:00

## 2018-03-05 RX ADMIN — PANTOPRAZOLE SODIUM 40 MG: 40 TABLET, DELAYED RELEASE ORAL at 06:28

## 2018-03-05 RX ADMIN — CLOPIDOGREL 75 MG: 75 TABLET, FILM COATED ORAL at 07:53

## 2018-03-05 RX ADMIN — APIXABAN 5 MG: 5 TABLET, FILM COATED ORAL at 07:54

## 2018-03-05 RX ADMIN — APIXABAN 5 MG: 5 TABLET, FILM COATED ORAL at 20:53

## 2018-03-05 RX ADMIN — VANCOMYCIN HYDROCHLORIDE 1500 MG: 5 INJECTION, POWDER, LYOPHILIZED, FOR SOLUTION INTRAVENOUS at 07:55

## 2018-03-05 RX ADMIN — TAZOBACTAM SODIUM AND PIPERACILLIN SODIUM 3.38 G: 375; 3 INJECTION, SOLUTION INTRAVENOUS at 06:28

## 2018-03-05 RX ADMIN — SIMVASTATIN 40 MG: 40 TABLET, FILM COATED ORAL at 20:53

## 2018-03-05 RX ADMIN — OXYCODONE HYDROCHLORIDE 2.5 MG: 5 TABLET ORAL at 16:24

## 2018-03-05 RX ADMIN — INSULIN GLARGINE 44 UNITS: 100 INJECTION, SOLUTION SUBCUTANEOUS at 21:37

## 2018-03-05 RX ADMIN — ISOSORBIDE MONONITRATE 30 MG: 30 TABLET, EXTENDED RELEASE ORAL at 07:53

## 2018-03-05 ASSESSMENT — ACTIVITIES OF DAILY LIVING (ADL)
ADLS_ACUITY_SCORE: 8

## 2018-03-05 NOTE — CONSULTS
ID consult dictated IMP 1 68 yo diabetic prolonged foot wd, not clear deep infection, now acute sepsis/leg cellulits. Likely strep    REC 1 simplify to ancef, elevate leg, po soon

## 2018-03-05 NOTE — PHARMACY-VANCOMYCIN DOSING SERVICE
Pharmacy Vancomycin Note  Date of Service 2018  Patient's  1950   67 year old, male    Indication: Skin and Soft Tissue Infection.  Pt is also on zosyn.  WBC 6.9 today, .  ESR yesterday 17.  Cx NGTD.    Goal Trough Level: 10-15 mg/L  Day of Therapy: 3  Current Vancomycin regimen:  1500 mg IV q12h    Current estimated CrCl = Estimated Creatinine Clearance: 115.8 mL/min (based on Cr of 0.84).    Creatinine for last 3 days  3/3/2018:  2:50 PM Creatinine 0.92 mg/dL  3/4/2018:  6:37 AM Creatinine 0.92 mg/dL  3/5/2018:  7:24 AM Creatinine 0.84 mg/dL    Recent Vancomycin Levels (past 3 days)  3/5/2018:  7:24 AM Vancomycin Level 12.5 mg/L    Vancomycin IV Administrations (past 72 hours)                   vancomycin (VANCOCIN) 1500 mg in 0.9% NaCl 250 mL PREMIX (mg) 1,500 mg New Bag 18 0755     1,500 mg New Bag 18 1934     1,500 mg New Bag  0754     1,500 mg New Bag 18                Nephrotoxins and other renal medications (Future)    Start     Dose/Rate Route Frequency Ordered Stop    18 1200  piperacillin-tazobactam (ZOSYN) infusion 3.375 g      3.375 g  100 mL/hr over 30 Minutes Intravenous EVERY 6 HOURS 18 0935      18 2030  vancomycin (VANCOCIN) 1500 mg in 0.9% NaCl 250 mL PREMIX      1,500 mg  166.7 mL/hr over 90 Minutes Intravenous EVERY 12 HOURS 18               Contrast Orders - past 72 hours (72h ago through future)    Start     Dose/Rate Route Frequency Ordered Stop    18 1200  gadobutrol (GADAVIST) injection 10 mL      10 mL Intravenous ONCE 18 1146 18 1149    18 1611  iopamidol (ISOVUE-370) solution 500 mL      500 mL Intravenous ONCE 18 1610 18 1612          Interpretation of levels and current regimen:  Trough level is  Therapeutic    Has serum creatinine changed > 50% in last 72 hours: No    Urine output:  unable to determine    Renal Function: Stable    Plan:  1.  Continue Current Dose  2.   Pharmacy will check trough levels as appropriate in 3-5 Days.    3. Serum creatinine levels will be ordered a minimum of twice weekly.      Oneida Cao        .

## 2018-03-05 NOTE — CONSULTS
Consult Date:  03/05/2018      INFECTIOUS DISEASE CONSULTATION      LOCATION:  Room 337      REFERRING PHYSICIAN:  Dr. Eckert      IMPRESSIONS:   1.  Jayro Elder is a 67-year-old male with acute low-grade sepsis, left leg cellulitis is the obvious cause.  While he has a foot wound present, he does not have obvious deep infection and even though he has diabetes, the appearance of the foot and leg is currently that of conventional cellulitis, not really of interest that area spread in day 1 on Ancef, still almost certainly streptococcal.   2.  Foot wound, apparently being treated as a possible wart, but ongoing wound present seems like more than that, but no obvious deep infection.   3.  Diabetes mellitus.   4.  Syncopal episode, probably related to sepsis.   5.  Chronic atrial fibrillation with rapid ventricular response.   6.  Coronary artery disease.      RECOMMENDATIONS:   1.  I would simplify antibiotics back to Ancef.  No real logic to needing vanco or broad coverage here.   2.  Probably oral therapy relatively soon.   3.  Elevate leg as much as possible.   4.  Continue conservative foot wound management.      HISTORY OF PRESENT ILLNESS:  Jayro Elder is a 67-year-old male is seen in consultation due to left leg cellulitis.  The patient has a history of a chronic wound in that foot and underlying diabetes.  Previous to this, it has not been perceived to be deep infection, although he has gotten antibiotics several times.  He has never had leg cellulitis previously, nor other major infections or hospitalizations.  He has actually been seeing Podiatry who has been treating it with laser as though it might be a wart, which seems highly unlikely.  The patient noted Thursday night into Friday he had some malaise and fatigue.  Then, on Saturday, he had a syncopal episode.  He noticed as he woke up he did not feel well, had a degree of nausea, eventually sought medical attention and at the time of presentation had  an obvious left leg cellulitis along with the ongoing foot process.  He was initially started on antibiotics for cellulitis, initially given Ancef, but very rapidly thereafter switched over to Zosyn and vancomycin.  The patient has been on that the last 2 days.  Septic symptoms have resolved.  White count is now normal.  Blood cultures are negative.  No perception that any intervention for the foot is needed at present.      PAST MEDICAL HISTORY:  Diabetes, history of coronary artery disease, history of atrial fibrillation with rapid ventricular response.      SOCIAL AND FAMILY HISTORY:  No known resistant pathogens.  No recent travel or exposures.  No active alcohol or tobacco use.      REVIEW OF SYSTEMS:  Largely as listed above.  While he did not have definite fever, by description he had probable sepsis symptoms prior to admission.      PHYSICAL EXAMINATION:   GENERAL:  The patient appears his stated age, does not look particularly toxic or ill.   VITAL SIGNS:  Currently are normal.  He has been afebrile since admission.   HEENT:  No thrush or intraoral lesions.  Pupils are reactive.   NECK:  Supple and nontender without lymphadenopathy.   HEART:  Regular rhythm, no murmur or gallop.   LUNGS:  Clear bilaterally.   ABDOMEN:  Soft, nontender.   EXTREMITIES:  Right leg fairly unremarkable, as is foot.  Left foot with wound, which is currently wrapped.  The left leg has intense erythema with typical streptococcal-type appearance.  There is a fair amount of edema present as well.  No major lymphangitis.      LABORATORY:  Initial white count 12,000, now 6900.      Thank you much for the consultation.  I will follow the patient with you.         ELIZA GARNICA MD             D: 2018   T: 2018   MT: KENNA      Name:     PORTER YANCEY   MRN:      [id number]        Account:       [id number]   :      1950           Consult Date:  2018      Document: [id number]       cc: Davion Cordova  KASSI

## 2018-03-05 NOTE — PROGRESS NOTES
[facility]  Hospitalist Progress Note  Emery Eckert MD 03/04/18    Reason for Stay (Diagnosis): Left lower extremity cellulitis         Assessment and Plan:      Summary of Stay: Jayro Elder is a 67 year old male with history of A. fib on Eliquis, IDDM, CAD status post stents, HLD, CVA, hypothyroidism, GERD, and a chronic left plantar foot wound over the last 1 year who was admitted on 3/3/2018 with left leg swelling and redness consistent with cellulitis in addition to syncope while in the bathroom.  He had a leukocytosis to 12.3, but is afebrile.  Slight tachycardia but no hypertension.  CRP significantly elevated.  He was started on vancomycin and Zosyn for broad coverage given his associated left foot wound in setting of diabetes.  Podiatry has been consulted and has requested MRI of the foot and MASON/ultrasound of the lower extremity.  As far as his syncope goes this was an episode in the bathroom associated with dizziness, nausea, vomiting likely due to his acute infection and mild hypovolemia.  MRI did not show evidence of osteomyelitis.  ID consulted for antibiotics.  Narrowing from vancomycin and Zosyn to Ancef and monitoring.  Podiatry recommending outpatient follow-up with vascular surgery consultation based on MASON of left leg.    Problem List/Assessment and Plan:   LLE cellulitis in setting of chronic L plantar foot wound: Wound present on left plantar foot near first MPJ joint for the past 1 year.  Now with erythematous, swelling, pain of the left lower leg consistent with acute cellulitis.  .  Has leukocytosis mild tachycardia but does not appear septic.  History of diabetes and the foot wound he was started on Vancomycin and Zosyn.  MRI of the left foot does not show any sign of osteomyelitis.  Erythema of the left leg significantly improved.  -ID consulted  -Vancomycin and Zosyn changed to Ancef  -Podiatry consulted, appreciate recs.  MRI foot and MASON's to be  obtained  -MASON of the left leg is normal however waveforms are biphasic and podiatry is recommending outpatient vascular surgery clinic follow-up.  Surgical shoe for ambulation.  Iodosorb dressing changes to L foot daily.  Follow-up with Ara Ambriz at [facility] 1-2 weeks after discharge.  -Tylenol and low-dose oxycodone as needed for pain.    Syncope: One episode of syncope while in the bathroom.  No prodromal lightheadedness but was dizzy afterwards.  Did hit his nose but no sign of significant injury.  He did not have head CT, but is on Eliquis.  No focal neuro deficit or symptoms to suggest acute intracranial bleeding.  Suspect this was related to hypovolemia in setting of acute infection versus vasovagal as often seen in the bathroom.  He has A. fib but no other history of arrhythmia.  -Nothing on telemetry that would explain syncope here.  -If an ongoing issue would obtain TTE at that time    Chronic A. fib: Acute RVR with heart rate 110-120.  PT on Eliquis and metoprolol  mg at bedtime.  Metoprolol was given as lower dose tartrate in the morning due to acute infection.  -Resumed Toprol-XL at 100 mg at bedtime  -Continue Eliquis   -Telemetry    CAD: History of stents with last 6/2017.  PTA on Plavix and Eliquis along with statin.  No chest pain.  -Resumed Plavix, Eliquis, statin, BB    HLD: Resume statin    IDDM: PTA on Lantus 44 units at bedtime and Metformin 1000 mg twice daily.  Due to acute infection he was given half dose Lantus on admission.  A1c is 8 point.  -Increase Lantus to PTA 44 units at bedtime this evening  -Increase to high dose sliding scale insulin    Hypothyroidism: Resume PTA levothyroxine    GERD: Continue PTA PPI    History CVA: Continue Eliquis and Plavix      # Pain Assessment:   Current Pain Score 3/5/2018 3/5/2018 3/4/2018   Patient currently in pain? denies denies denies   Pain score (0-10) - - -   Pain location - - -   Pain descriptors - - -   -  "Jayro is experiencing pain due to left leg cellulitis. Pain management was discussed and the plan was created in a collaborative fashion.  Jayro's response to the current recommendations: engaged  - Please see the plan for pain management as documented above    DVT Prophylaxis: Eliquis  Code Status: Full Code  FEN: Consistent carb medium   Discharge Dispo: TBD  Estimated Disch Date / # of Days until Disch: Likely 1-2 days if ongoing improvement in cellulitis        Interval History (Subjective):      Still has some burning pain to the left leg with swelling.  No fevers or chills.  Good p.o. intake.                  Physical Exam:      Last Vital Signs:  /90 (BP Location: Left arm)  Pulse 82  Temp 98.1  F (36.7  C) (Oral)  Resp 18  Ht 1.93 m (6' 4\")  Wt 109.6 kg (241 lb 9.6 oz)  SpO2 96%  BMI 29.41 kg/m2    Intake/Output Summary (Last 24 hours) at 03/05/18 1528  Last data filed at 03/05/18 0700   Gross per 24 hour   Intake              751 ml   Output                0 ml   Net              751 ml     Constitutional: Awake, NAD  Eyes: sclera white   HEENT:  MMM  Respiratory:    no crackles or wheeze  Cardiovascular:    GI: non-tender, not distended, bowel sounds present  Skin: dark area of red to L anterior shin, surrounding erythema resolved well with inside pen marking.    Musculoskeletal/extremities: 2+ LE edema of the left leg, trace R leg.  Plantar wound to 1st MPJ joint  Neurologic: A&O   Psychiatric: calm, cooperative          Medications:      All current medications were reviewed with changes reflected in problem list.         Data:      All new lab and imaging data was reviewed.   Labs:    Recent Labs  Lab 03/03/18  1936 03/03/18  1843   CULT No growth after 2 days No growth after 2 days       Recent Labs  Lab 03/05/18  0724 03/04/18  0637 03/03/18  1450    134 131*   POTASSIUM 3.6 3.4 3.5   CHLORIDE 103 101 97   CO2 27 26 26   ANIONGAP 6 7 8   * 179* 256*   BUN 11 13 18   CR 0.84 " 0.92 0.92   GFRESTIMATED >90 82 82   GFRESTBLACK >90 >90 >90   BETTIE 8.3* 8.0* 9.1       Recent Labs  Lab 03/05/18  0724 03/04/18  0637 03/03/18  1450   WBC 6.9 9.8 12.3*   HGB 11.9* 12.7* 13.8   HCT 35.4* 37.5* 40.3   MCV 86 85 84    178 196       Recent Labs  Lab 03/05/18  0724 03/04/18  0637   SED  --  17   .0* 152.0*      Imaging:   Arterial US LE:  FINDINGS:  Right MASON: 1.25, previously 1.28.  Left MASON: 0.99, previously 1.3.     Waveforms: Right waveforms are triphasic. Left waveforms are biphasic.         IMPRESSION:   1. Normal right MASON without evidence of arterial insufficiency.  2. Normal left MASON, however waveforms are biphasic today compared to  triphasic previously. Consider Doppler ultrasound for further  evaluation.    MRI left foot:  FINDINGS:  Prominent first MTP joint degenerative changes with  subchondral edema. No definite evidence of osteomyelitis. No abscess.  Mild diffuse subcutaneous edema.     No tendon abnormalities.         IMPRESSION:  1. Advanced first MTP joint degenerative changes. There is some  subchondral edema as result but no definite lytic or destructive  lesions or bone marrow findings to suggest osteomyelitis.  2. No evidence of abscess.     Photos of leg from admission:            Emery Eckert MD

## 2018-03-05 NOTE — PLAN OF CARE
Problem: Patient Care Overview  Goal: Plan of Care/Patient Progress Review  Outcome: Improving  Neuro: A & O x 4.   VS: VSS  Tele: Afib CVR, BBB  HR 80-90s  Respiratory: WDL  GI: WDL  : WDL  Skin: Left lower extremity redness with in marked area. Swelling to lower extremity.   Pain: Minimal pain to lower left extremity - refused pain medication at this time.   IV: PIV WDL SL  Transfer: Up independently in room.   Diet: Mod cho. Tolerating PO foods and fluids.   Plan: TBD. Daily dressing change to wound on Left foot. Pediatry, Hospitalist and ID following. Continues on IV Vanco, IV zosyn.

## 2018-03-05 NOTE — PLAN OF CARE
Problem: Patient Care Overview  Goal: Plan of Care/Patient Progress Review  Outcome: No Change  [id number]: Pt resting comfortably. VSS. A&O. Denies pain, dizziness and lightheadedness. . Tele reads afib CVR BBB. On zosyn. LLE cellulitis red not going beyond outline, elevated on pillow. Dressing on L foot CDI. Transfers with SBA/Ax1. Will continue to monitor.

## 2018-03-05 NOTE — PROGRESS NOTES
"Podiatry / Foot and Ankle Surgery Progress Note    March 5, 2018    Subject: Patient was seen at bedside.  Notes minimal pain to foot.     Objective:  Vitals: /90 (BP Location: Left arm)  Pulse 82  Temp 98.1  F (36.7  C) (Oral)  Resp 18  Ht 1.93 m (6' 4\")  Wt 109.6 kg (241 lb 9.6 oz)  SpO2 96%  BMI 29.41 kg/m2  BMI= Body mass index is 29.41 kg/(m^2).     A1c: 8.8  ESR; 17  CrP: 110    General:  Patient is alert and orientated.  NAD  Lymph:    Moderate edema left lower extremity   Vasc:    Pulses not readily palpable, CFT minimally delayed  Neuro:    Light touch sensation diminished at the level of the digits  Derm:    Blood blister plantar-medial L 1st MPJ, full-thickness measures 0.3cmx 0.3cm x 0.3cm after debridement, soft tissue covering bone, but there's a hard end-feel with exploration of the wound.  Generalized low-grade callusing/dried blood around plantar and medial L 1st MPJ.   MSK:    Left lower extremity recurrent bunion with mild hammering of digits without acute deformity.  No pain with PROM L 1st MPJ.  Calf:    Neg for tenderness    MRI:Advanced first MTP joint degenerative changes. There is some subchondral edema as result but no definite lytic or destructive  lesions or bone marrow findings to suggest osteomyelitis. No evidence of abscess     MASON: Normal left MASON, however waveforms are biphasic today compared to triphasic previously. Consider Doppler ultrasound for further  evaluation.     Assessment:  67 year old male with diabetic with neuropathy, left foot ulcer with fat layer exposed,   left lower leg cellulitis, PAD        Plan:    - MRI shows arthritis but no evidence of bone infection.   -MASON's show biphasic flow.  Is decreased from last study. Recommend outpatient follow up with vascular surgery.   -currently no indication for surgery.   -surgical shoe for ambulation  -continue iodosorb dressing changes to the left foot daily.   -Patient will follow up with Ara Ambriz at " the [facility] 1-2 weeks after discharge.   -discussed the importance of offloading.   -Podiatry will sign off. Please call with questions or concerns.     Татьяна Mckeon DPM, Podiatry/Foot and Ankle Surgery  11:58 AM

## 2018-03-05 NOTE — PLAN OF CARE
Problem: Patient Care Overview  Goal: Plan of Care/Patient Progress Review  Outcome: Improving  VSS. A&OX4. Tele Afib with BBB. L. Leg Cellulitis improving - leg remains warm and tender to the touch. L. Foot in foam dressing. IV antibiotics continues. Family was present and involved in continuation of POC. Ambulated to bathroom SBA.

## 2018-03-06 LAB
CREAT SERPL-MCNC: 0.82 MG/DL (ref 0.66–1.25)
CRP SERPL-MCNC: 67.2 MG/L (ref 0–8)
ERYTHROCYTE [DISTWIDTH] IN BLOOD BY AUTOMATED COUNT: 13 % (ref 10–15)
GFR SERPL CREATININE-BSD FRML MDRD: >90 ML/MIN/1.7M2
GLUCOSE BLDC GLUCOMTR-MCNC: 172 MG/DL (ref 70–99)
GLUCOSE BLDC GLUCOMTR-MCNC: 229 MG/DL (ref 70–99)
GLUCOSE BLDC GLUCOMTR-MCNC: 233 MG/DL (ref 70–99)
GLUCOSE BLDC GLUCOMTR-MCNC: 247 MG/DL (ref 70–99)
GLUCOSE BLDC GLUCOMTR-MCNC: 253 MG/DL (ref 70–99)
HCT VFR BLD AUTO: 32.8 % (ref 40–53)
HGB BLD-MCNC: 11 G/DL (ref 13.3–17.7)
MCH RBC QN AUTO: 28.7 PG (ref 26.5–33)
MCHC RBC AUTO-ENTMCNC: 33.5 G/DL (ref 31.5–36.5)
MCV RBC AUTO: 86 FL (ref 78–100)
PLATELET # BLD AUTO: 175 10E9/L (ref 150–450)
RBC # BLD AUTO: 3.83 10E12/L (ref 4.4–5.9)
WBC # BLD AUTO: 5.1 10E9/L (ref 4–11)

## 2018-03-06 PROCEDURE — 36415 COLL VENOUS BLD VENIPUNCTURE: CPT | Performed by: INTERNAL MEDICINE

## 2018-03-06 PROCEDURE — 12000007 ZZH R&B INTERMEDIATE

## 2018-03-06 PROCEDURE — 25000128 H RX IP 250 OP 636: Performed by: INTERNAL MEDICINE

## 2018-03-06 PROCEDURE — 99233 SBSQ HOSP IP/OBS HIGH 50: CPT | Performed by: INTERNAL MEDICINE

## 2018-03-06 PROCEDURE — 25000132 ZZH RX MED GY IP 250 OP 250 PS 637: Mod: GY | Performed by: INTERNAL MEDICINE

## 2018-03-06 PROCEDURE — 25000131 ZZH RX MED GY IP 250 OP 636 PS 637: Mod: GY | Performed by: INTERNAL MEDICINE

## 2018-03-06 PROCEDURE — A9270 NON-COVERED ITEM OR SERVICE: HCPCS | Mod: GY | Performed by: INTERNAL MEDICINE

## 2018-03-06 PROCEDURE — 85027 COMPLETE CBC AUTOMATED: CPT | Performed by: INTERNAL MEDICINE

## 2018-03-06 PROCEDURE — 82565 ASSAY OF CREATININE: CPT | Performed by: INTERNAL MEDICINE

## 2018-03-06 PROCEDURE — 86140 C-REACTIVE PROTEIN: CPT | Performed by: INTERNAL MEDICINE

## 2018-03-06 PROCEDURE — 00000146 ZZHCL STATISTIC GLUCOSE BY METER IP

## 2018-03-06 RX ORDER — LISINOPRIL 20 MG/1
20 TABLET ORAL 2 TIMES DAILY
Status: DISCONTINUED | OUTPATIENT
Start: 2018-03-06 | End: 2018-03-07 | Stop reason: HOSPADM

## 2018-03-06 RX ORDER — OXYCODONE HYDROCHLORIDE 5 MG/1
5 TABLET ORAL EVERY 4 HOURS PRN
Status: DISCONTINUED | OUTPATIENT
Start: 2018-03-06 | End: 2018-03-07 | Stop reason: HOSPADM

## 2018-03-06 RX ADMIN — LEVOTHYROXINE SODIUM 137 MCG: 112 TABLET ORAL at 20:52

## 2018-03-06 RX ADMIN — CEFAZOLIN SODIUM 2 G: 2 INJECTION, SOLUTION INTRAVENOUS at 08:12

## 2018-03-06 RX ADMIN — OXYCODONE HYDROCHLORIDE 2.5 MG: 5 TABLET ORAL at 00:53

## 2018-03-06 RX ADMIN — ACETAMINOPHEN 650 MG: 325 TABLET, FILM COATED ORAL at 20:52

## 2018-03-06 RX ADMIN — ISOSORBIDE MONONITRATE 30 MG: 30 TABLET, EXTENDED RELEASE ORAL at 08:12

## 2018-03-06 RX ADMIN — METOPROLOL SUCCINATE 100 MG: 100 TABLET, EXTENDED RELEASE ORAL at 20:53

## 2018-03-06 RX ADMIN — APIXABAN 5 MG: 5 TABLET, FILM COATED ORAL at 20:52

## 2018-03-06 RX ADMIN — OXYCODONE HYDROCHLORIDE 5 MG: 5 TABLET ORAL at 20:53

## 2018-03-06 RX ADMIN — LISINOPRIL 20 MG: 20 TABLET ORAL at 20:53

## 2018-03-06 RX ADMIN — INSULIN GLARGINE 49 UNITS: 100 INJECTION, SOLUTION SUBCUTANEOUS at 22:55

## 2018-03-06 RX ADMIN — ACETAMINOPHEN 650 MG: 325 TABLET, FILM COATED ORAL at 14:27

## 2018-03-06 RX ADMIN — CEFAZOLIN SODIUM 2 G: 2 INJECTION, SOLUTION INTRAVENOUS at 23:42

## 2018-03-06 RX ADMIN — OXYCODONE HYDROCHLORIDE 2.5 MG: 5 TABLET ORAL at 12:09

## 2018-03-06 RX ADMIN — APIXABAN 5 MG: 5 TABLET, FILM COATED ORAL at 08:12

## 2018-03-06 RX ADMIN — PANTOPRAZOLE SODIUM 40 MG: 40 TABLET, DELAYED RELEASE ORAL at 06:53

## 2018-03-06 RX ADMIN — SIMVASTATIN 40 MG: 40 TABLET, FILM COATED ORAL at 20:52

## 2018-03-06 RX ADMIN — CLOPIDOGREL 75 MG: 75 TABLET, FILM COATED ORAL at 08:12

## 2018-03-06 RX ADMIN — ACETAMINOPHEN 650 MG: 325 TABLET, FILM COATED ORAL at 00:53

## 2018-03-06 RX ADMIN — CEFAZOLIN SODIUM 2 G: 2 INJECTION, SOLUTION INTRAVENOUS at 16:17

## 2018-03-06 ASSESSMENT — ACTIVITIES OF DAILY LIVING (ADL)
ADLS_ACUITY_SCORE: 8
ADLS_ACUITY_SCORE: 7
ADLS_ACUITY_SCORE: 8

## 2018-03-06 NOTE — PROGRESS NOTES
[facility]     HISTORY OF PRESENT ILLNESS: Mr. Jayro Elder is a 67-year-old gentleman who returns to our clinic for bilateral foot wounds due to verruca. He has now been seeing Dr. Glez who has initiated pulsed dye laser treatments. However, he reports that Dr. Glez does not perform any kind of debridement on these lesions and Jayro is bothered by the pain that the lesions cause him. Last visit with us a significant amount of hyperkeratotic tissue was debrided.     Jayro has had imaging that was negative for bone infection. Also has had recent ABIs that were within normal limits.      OFFLOADING: Doesn't utilize any offloading currently. Owns a DH2 shoe. Tried to get custom insoles and reported that they were not covered by his insurance.      DATE WOUND ACQUIRED: 2016     PAST MEDICAL HISTORY: type 2 DM, neuropathy, MI, generalized osteoarthrosis, HTN, cardiomyopathy, CAD with multiple stents, hypothyroidism, atrial fibrillation     SOCIAL HISTORY: retired, [place]     MEDICATIONS: reviewed, see med-rec for most up to date list     VITALS: /82  Pulse 82  Temp 96.6  F (35.9  C) (Tympanic)     PHYSICAL EXAM:  GENERAL: Patient is alert and oriented and in no acute distress  INTEGUMENTARY: hyperkeratotic, verrucous lesions on right third toe and left plantar met head     PROCEDURE: Per standing orders, topical 4% lidocaine was applied to the wound by the CMA. Timeout was called and informed consent was obtained. Using a 10 blade and a tissue nipper, the hyperkeratotic lesions were excised down to bleeding tissue.      ASSESSMENT: bilateral plantar verruca in a patient with diabetes and neuropathy     PLAN:   1. Recommend 40% salicylic acid nightly  2. Continue PDL with Dr. Glez as he needs treatment to target the wart in order to resolve this issue  3. Does not need to follow-up with our clinic unless residual wounds after wart  treatment     FOLLOW-UP: JIMMY SEAMAN PA-C

## 2018-03-06 NOTE — PROGRESS NOTES
"[facility]  Infectious Disease Progress Note          Assessment and Plan:   IMPRESSIONS:   1.  Jayro Elder is a 67-year-old male with acute low-grade sepsis, left leg cellulitis is the obvious cause.  While he has a foot wound present, he does not have obvious deep infection and even though he has diabetes, the appearance of the foot and leg is currently that of conventional cellulitis, not really of interest that area spread in day 1 on Ancef, still almost certainly streptococcal.   2.  Foot wound, apparently being treated as a possible wart, but ongoing wound present seems like more than that, but no obvious deep infection.   3.  Diabetes mellitus.   4.  Syncopal episode, probably related to sepsis.   5.  Chronic atrial fibrillation with rapid ventricular response.   6.  Coronary artery disease.       RECOMMENDATIONS:   1. Continue Ancef.  No real logic to needing vanco or broad coverage here.   2.  Probably oral therapy 1-2 days more   3.  Elevate leg as much as possible. Doing better and erythema less  4.  Continue conservative foot wound management.             Interval History:   no new complaints and doing well; no cp, sob, n/v/d, or abd pain. Leg painful still, no fever no + cxs              Medications:       insulin glargine  44 Units Subcutaneous At Bedtime     insulin aspart  1-10 Units Subcutaneous TID AC     insulin aspart  1-7 Units Subcutaneous At Bedtime     ceFAZolin  2 g Intravenous Q8H     metoprolol succinate  100 mg Oral At Bedtime     apixaban ANTICOAGULANT  5 mg Oral BID     isosorbide mononitrate  30 mg Oral Daily     levothyroxine  137 mcg Oral At Bedtime     pantoprazole  40 mg Oral QAM AC     simvastatin  40 mg Oral At Bedtime     clopidogrel  75 mg Oral Daily                  Physical Exam:   Blood pressure (!) 166/101, pulse 87, temperature 97.2  F (36.2  C), temperature source Oral, resp. rate 20, height 1.93 m (6' 4\"), weight 109.6 kg (241 lb 9.6 oz), SpO2 " 97 %.  Wt Readings from Last 2 Encounters:   03/03/18 109.6 kg (241 lb 9.6 oz)   10/25/17 112.9 kg (249 lb)     Vital Signs with Ranges  Temp:  [95.1  F (35.1  C)-97.3  F (36.3  C)] 97.2  F (36.2  C)  Pulse:  [85-87] 87  Heart Rate:  [68-89] 69  Resp:  [17-20] 20  BP: (139-166)/() 166/101  SpO2:  [95 %-97 %] 97 %    Constitutional: Awake, alert, cooperative, no apparent distress   Lungs: Clear to auscultation bilaterally, no crackles or wheezing   Cardiovascular: Regular rate and rhythm, normal S1 and S2, and no murmur noted   Abdomen: Normal bowel sounds, soft, non-distended, non-tender   Skin: No rashes, no cyanosis, L edema less erythema, tender wo fluctuance   Other:           Data:   All microbiology laboratory data reviewed.  Recent Labs   Lab Test  03/06/18   0621  03/05/18   0724  03/04/18   0637   WBC  5.1  6.9  9.8   HGB  11.0*  11.9*  12.7*   HCT  32.8*  35.4*  37.5*   MCV  86  86  85   PLT  175  173  178     Recent Labs   Lab Test  03/06/18   0621  03/05/18   0724  03/04/18   0637   CR  0.82  0.84  0.92     Recent Labs   Lab Test  03/04/18   0637   SED  17     Recent Labs   Lab Test  03/03/18   1936  03/03/18   1843   CULT  No growth after 3 days  No growth after 3 days

## 2018-03-06 NOTE — CONSULTS
Care Coordination:  CTS consulted for establishing new PCP. Met with pt at bedside to discuss follow up plan. Pt states he currently goes to [facility] and would like to switch to [facility] He does not have a preference on male/female or appt time/day. He anticipates discharging today or tomorrow depending on ID recommendations.     Follow up scheduled:  Dr Cook at [facility] [place] on Monday 3/12 at 11:00.    Appointment and clinic information given to pt.    Chloé Smiley RN CTS

## 2018-03-06 NOTE — PLAN OF CARE
Problem: Patient Care Overview  Goal: Plan of Care/Patient Progress Review  Outcome: Improving  Slight improvement to left leg cellulitis. This am redness is less than leg markings. This afternoon, left leg more red after being up. C/O pain to left leg, analgesic given. C/o headache, tylenol given. A&O x 4, pleasant. Continues on IV antibiotics.

## 2018-03-06 NOTE — PROGRESS NOTES
[facility]  Hospitalist Progress Note    Davion Mir MD 03/06/2018  Text Page      Reason for Stay (Diagnosis): cellulitis         Assessment and Plan:      Summary of Stay: Jayro Elder is a 67 year old male with history of A. fib on Eliquis, IDDM, CAD status post stents, HLD, CVA, hypothyroidism, GERD, and a chronic left plantar foot wound over the last 1 year who was admitted on 3/3/2018 with left leg swelling and redness consistent with cellulitis in addition to syncope while in the bathroom.  He had a leukocytosis to 12.3, but is afebrile.  Slight tachycardia but no hypertension.  CRP significantly elevated.  He was started on vancomycin and Zosyn for broad coverage given his associated left foot wound in setting of diabetes.  Podiatry has been consulted and has requested MRI of the foot and MASON/ultrasound of the lower extremity.  As far as his syncope goes this was an episode in the bathroom associated with dizziness, nausea, vomiting likely due to his acute infection and mild hypovolemia.  MRI did not show evidence of osteomyelitis.  ID consulted for antibiotics.  Narrowing from vancomycin and Zosyn to Ancef and monitoring.  Podiatry recommending outpatient follow-up with vascular surgery consultation based on MASON of left leg.     Problem List/Assessment and Plan:   LLE cellulitis in setting of chronic L plantar foot wound: Wound present on left plantar foot near first MPJ joint for the past 1 year.  Now with erythematous, swelling, pain of the left lower leg consistent with acute cellulitis.  .  Has leukocytosis mild tachycardia but does not appear septic.  History of diabetes and the foot wound he was started on Vancomycin and Zosyn.  MRI of the left foot does not show any sign of osteomyelitis.  Erythema of the left leg significantly improved.  -ID consulting- recommend a couple more days of IV ancef (started abx 3/3 in PM, ancef on  3/5)  -Vancomycin and Zosyn changed  to Ancef  -Podiatry consulted, appreciate recs.   -MASON of the left leg is normal however waveforms are biphasic and podiatry is recommending outpatient vascular surgery clinic follow-up.  Surgical shoe for ambulation.  Iodosorb dressing changes to L foot daily.  Follow-up with Ara Ambriz at [facility] 1-2 weeks after discharge.  -Tylenol and low-dose oxycodone as needed for pain.  Increase oxycodone to 5 mg today.      Syncope: One episode of syncope while in the bathroom.  No prodromal lightheadedness but was dizzy afterwards.  Did hit his nose but no sign of significant injury.  He did not have head CT, but is on Eliquis.  No focal neuro deficit or symptoms to suggest acute intracranial bleeding.  Suspect this was related to hypovolemia in setting of acute infection versus vasovagal as often seen in the bathroom.  He has A. fib but no other history of arrhythmia.  -Nothing on telemetry that would explain syncope.  -If an ongoing issue would obtain TTE at that time  -Ambulate with PT and assess for symptoms     Chronic A. fib: Acute RVR with heart rate 110-120.  PT on Eliquis and metoprolol  mg at bedtime.  Metoprolol was given as lower dose tartrate in the morning due to acute infection.  -Resumed Toprol-XL at 100 mg at bedtime  -Continue Eliquis   -Telemetry  -Rate well controlled     CAD: History of stents with last 6/2017.  PTA on Plavix and Eliquis along with statin.  No chest pain.  -Resumed Plavix, Eliquis, statin, BB     HLD: Resume statin     IDDM: PTA on Lantus 44 units at bedtime and Metformin 1000 mg twice daily.  Due to acute infection he was given half dose Lantus on admission.  A1c is 8 point.  -Increased Lantus to PTA dose of 44 units last night  -Increase to high dose sliding scale insulin  - will increase lantus to 49 U tonight     Hypothyroidism: Resume PTA levothyroxine     GERD: Continue PTA PPI     History CVA: Continue Eliquis and Plavix    HTN: BP getting  "elevated  -Will resume lisinopril at home dose  -Continue imdur and metoprolol       # Pain Assessment:   Current Pain Score 3/6/2018 3/6/2018 3/6/2018   Patient currently in pain? yes yes yes   Pain score (0-10) 4 3 4   Pain location Head Foot Foot   Pain descriptors Aching;Constant Aching;Constant Aching;Constant   - Jayro is experiencing pain due to cellulitis. Pain management was discussed and the plan was created in a collaborative fashion.  Jayro's response to the current recommendations: engaged  - Please see the plan for pain management as documented above          DVT Prophylaxis: apixiban  Code Status: Full Code  Discharge Dispo: home in 1-2 days when able to switch to oral abx  Incidental Findings/ Discharge Issues: none        Interval History (Subjective):      Some pain in L leg.  Feels ok.                    Physical Exam:      Last Vital Signs:  BP (!) 165/98 (BP Location: Left arm)  Pulse 76  Temp 97.3  F (36.3  C) (Oral)  Resp 18  Ht 1.93 m (6' 4\")  Wt 109.6 kg (241 lb 9.6 oz)  SpO2 97%  BMI 29.41 kg/m2        Vital signs reviewed  General:  Alert, calm, NAD  CV: regular rate and rhythm, no murmurs or rubs  Lungs:  Clear to ascultation bilaterally, normal respiratory effort  HEENT:  Pupil round, equal, conjuctivae, sclerae and lids normal, neck is supple  Abdomen:  Soft, nontender, nondistended, no masses, normal bowel sounds  Extremities:  2+ edema in L shin, patch of erythema on L shin- smaller that previously outlined area  Neuro: normal strength and sensation in all 4 extremities, cranial nerves grossly intact  Psychiatric:  Mood and affect within normal limits           Medications:      All current medications were reviewed with changes reflected in problem list.         Data:      All new lab and imaging data was reviewed.   Labs:    WBC 5.1    "

## 2018-03-06 NOTE — PLAN OF CARE
Problem: Patient Care Overview  Goal: Plan of Care/Patient Progress Review  Outcome: No Change  A&O. /91, other VSS on RA. Tele Afib CVR with BBB. LS clear. C/o pain to LLE-tylenol and oxycodone PRN given. . L foot dressing CDI. LLE redness within marked areas. 2+ edema to LLE, bilateral legs elevated. On IV ancef. Up independently in room. ID, podiatry following. Continue with POC.

## 2018-03-06 NOTE — PLAN OF CARE
Problem: Patient Care Overview  Goal: Plan of Care/Patient Progress Review  Outcome: No Change  Pain: C/o pain in left leg  LOC: alert and oriented  Mobility: Independent in room. ( No dizziness.)  Lungs: clear.  95% RA  Tele: A-fib with CVR and BBB  GI: Cons carb diet  : voiding without difficulty  IV: PIV saline locked  Other: Ancef per orders.  Continue Eliquis, zocor, plavix.  Blood sugar checks 305 and 277 this shift.  I.D. And podiatry following.  Afebrile

## 2018-03-07 VITALS
HEART RATE: 77 BPM | RESPIRATION RATE: 16 BRPM | OXYGEN SATURATION: 95 % | SYSTOLIC BLOOD PRESSURE: 146 MMHG | WEIGHT: 241.6 LBS | HEIGHT: 76 IN | BODY MASS INDEX: 29.42 KG/M2 | TEMPERATURE: 98.1 F | DIASTOLIC BLOOD PRESSURE: 91 MMHG

## 2018-03-07 LAB
CRP SERPL-MCNC: 43.3 MG/L (ref 0–8)
GLUCOSE BLDC GLUCOMTR-MCNC: 181 MG/DL (ref 70–99)
GLUCOSE BLDC GLUCOMTR-MCNC: 239 MG/DL (ref 70–99)
GLUCOSE BLDC GLUCOMTR-MCNC: 296 MG/DL (ref 70–99)

## 2018-03-07 PROCEDURE — A9270 NON-COVERED ITEM OR SERVICE: HCPCS | Mod: GY | Performed by: INTERNAL MEDICINE

## 2018-03-07 PROCEDURE — 25000132 ZZH RX MED GY IP 250 OP 250 PS 637: Mod: GY | Performed by: INTERNAL MEDICINE

## 2018-03-07 PROCEDURE — 25000128 H RX IP 250 OP 636: Performed by: INTERNAL MEDICINE

## 2018-03-07 PROCEDURE — 86140 C-REACTIVE PROTEIN: CPT | Performed by: PODIATRIST

## 2018-03-07 PROCEDURE — 36415 COLL VENOUS BLD VENIPUNCTURE: CPT | Performed by: PODIATRIST

## 2018-03-07 PROCEDURE — 40000893 ZZH STATISTIC PT IP EVAL DEFER: Performed by: PHYSICAL THERAPIST

## 2018-03-07 PROCEDURE — 99239 HOSP IP/OBS DSCHRG MGMT >30: CPT | Performed by: INTERNAL MEDICINE

## 2018-03-07 PROCEDURE — 00000146 ZZHCL STATISTIC GLUCOSE BY METER IP

## 2018-03-07 RX ORDER — CEPHALEXIN 500 MG/1
500 CAPSULE ORAL 4 TIMES DAILY
Qty: 40 CAPSULE | Refills: 0 | Status: SHIPPED | OUTPATIENT
Start: 2018-03-07 | End: 2018-03-17

## 2018-03-07 RX ADMIN — APIXABAN 5 MG: 5 TABLET, FILM COATED ORAL at 08:48

## 2018-03-07 RX ADMIN — LISINOPRIL 20 MG: 20 TABLET ORAL at 08:48

## 2018-03-07 RX ADMIN — CEFAZOLIN SODIUM 2 G: 2 INJECTION, SOLUTION INTRAVENOUS at 15:49

## 2018-03-07 RX ADMIN — PANTOPRAZOLE SODIUM 40 MG: 40 TABLET, DELAYED RELEASE ORAL at 06:49

## 2018-03-07 RX ADMIN — ISOSORBIDE MONONITRATE 30 MG: 30 TABLET, EXTENDED RELEASE ORAL at 08:48

## 2018-03-07 RX ADMIN — CLOPIDOGREL 75 MG: 75 TABLET, FILM COATED ORAL at 08:48

## 2018-03-07 RX ADMIN — CEFAZOLIN SODIUM 2 G: 2 INJECTION, SOLUTION INTRAVENOUS at 08:48

## 2018-03-07 ASSESSMENT — ACTIVITIES OF DAILY LIVING (ADL)
ADLS_ACUITY_SCORE: 7

## 2018-03-07 NOTE — PROGRESS NOTES
Call placed to [facility], was advised script already on file, cost to patient will be $10. Also advised the pharmacy will reach out to patient to set up delivery and counseling if patient requests.   Dressing change to left foot completed per orders.

## 2018-03-07 NOTE — DISCHARGE INSTRUCTIONS
Patient Education    Cephalexin Hydrochloride Oral tablet    Cephalexin Monohydrate Oral capsule    Cephalexin Monohydrate Oral suspension    Cephalexin Monohydrate Oral tablet  Cephalexin Hydrochloride Oral tablet  What is this medicine?  CEPHALEXIN (sef a ARBEN in) is a cephalosporin antibiotic. It is used to treat certain kinds of bacterial infections It will not work for colds, flu, or other viral infections.  This medicine may be used for other purposes; ask your health care provider or pharmacist if you have questions.  What should I tell my health care provider before I take this medicine?  They need to know if you have any of these conditions:    kidney disease    stomach or intestine problems, especially colitis    an unusual or allergic reaction to cephalexin, other cephalosporins, penicillins, other antibiotics, medicines, foods, dyes or preservatives    pregnant or trying to get pregnant    breast-feeding  How should I use this medicine?  Take this medicine by mouth with a full glass of water. Follow the directions on the prescription label. This medicine can be taken with or without food. Take your medicine at regular intervals. Do not take your medicine more often than directed. Take all of your medicine as directed even if you think you are better. Do not skip doses or stop your medicine early.  Talk to your pediatrician regarding the use of this medicine in children. While this drug may be prescribed for selected conditions, precautions do apply.  Overdosage: If you think you have taken too much of this medicine contact a poison control center or emergency room at once.  NOTE: This medicine is only for you. Do not share this medicine with others.  What if I miss a dose?  If you miss a dose, take it as soon as you can. If it is almost time for your next dose, take only that dose. Do not take double or extra doses. There should be at least 4 to 6 hours between doses.  What may interact with this  medicine?    probenecid    some other antibiotics  This list may not describe all possible interactions. Give your health care provider a list of all the medicines, herbs, non-prescription drugs, or dietary supplements you use. Also tell them if you smoke, drink alcohol, or use illegal drugs. Some items may interact with your medicine.  What should I watch for while using this medicine?  Tell your doctor or health care professional if your symptoms do not begin to improve in a few days.  Do not treat diarrhea with over the counter products. Contact your doctor if you have diarrhea that lasts more than 2 days or if it is severe and watery.  If you have diabetes, you may get a false-positive result for sugar in your urine. Check with your doctor or health care professional.  What side effects may I notice from receiving this medicine?  Side effects that you should report to your doctor or health care professional as soon as possible:    allergic reactions like skin rash, itching or hives, swelling of the face, lips, or tongue    breathing problems    pain or trouble passing urine    redness, blistering, peeling or loosening of the skin, including inside the mouth    severe or watery diarrhea    unusually weak or tired    yellowing of the eyes, skin  Side effects that usually do not require medical attention (report to your doctor or health care professional if they continue or are bothersome):    gas or heartburn    genital or anal irritation    headache    joint or muscle pain    nausea, vomiting  This list may not describe all possible side effects. Call your doctor for medical advice about side effects. You may report side effects to FDA at 5-828-FDA-5001.  Where should I keep my medicine?  Keep out of the reach of children.  Store at room temperature between 59 and 86 degrees F (15 and 30 degrees C). Throw away any unused medicine after the expiration date.  NOTE:This sheet is a summary. It may not cover all  possible information. If you have questions about this medicine, talk to your doctor, pharmacist, or health care provider. Copyright  2016 Gold Standard

## 2018-03-07 NOTE — PLAN OF CARE
Problem: Patient Care Overview  Goal: Plan of Care/Patient Progress Review  Outcome: Improving  VSS except for BPs alert/oriented ambulates independently in room. On IV ancef wound on bottom of left foot dressing in place LLE cellulitis red and warm. On eliqis and plavix. Infectious disease/PT and  Podiatry following.

## 2018-03-07 NOTE — PROGRESS NOTES
"[facility]  Infectious Disease Progress Note          Assessment and Plan:   IMPRESSIONS:   1.  Jayro Elder is a 67-year-old male with acute low-grade sepsis, left leg cellulitis is the obvious cause.  While he has a foot wound present, he does not have obvious deep infection and even though he has diabetes, the appearance of the foot and leg is currently that of conventional cellulitis, not really of interest that area spread in day 1 on Ancef, still almost certainly streptococcal.   2.  Foot wound, apparently being treated as a possible wart, but ongoing wound present seems like more than that, but no obvious deep infection.   3.  Diabetes mellitus.   4.  Syncopal episode, probably related to sepsis.   5.  Chronic atrial fibrillation with rapid ventricular response.   6.  Coronary artery disease.       RECOMMENDATIONS:   1. Continue Ancef while here.  No real logic to needing vanco or broad coverage here.   2.  po keflex and home OK any time, 10 days   3 Continue to elevate leg at home            Interval History:   no new complaints and doing well; no cp, sob, n/v/d, or abd pain. Leg painful still, no fever no + cxs              Medications:       lisinopril  20 mg Oral BID     insulin glargine  49 Units Subcutaneous At Bedtime     insulin aspart  1-10 Units Subcutaneous TID AC     insulin aspart  1-7 Units Subcutaneous At Bedtime     ceFAZolin  2 g Intravenous Q8H     metoprolol succinate  100 mg Oral At Bedtime     apixaban ANTICOAGULANT  5 mg Oral BID     isosorbide mononitrate  30 mg Oral Daily     levothyroxine  137 mcg Oral At Bedtime     pantoprazole  40 mg Oral QAM AC     simvastatin  40 mg Oral At Bedtime     clopidogrel  75 mg Oral Daily                  Physical Exam:   Blood pressure (!) 152/92, pulse 77, temperature 96.4  F (35.8  C), temperature source Oral, resp. rate 16, height 1.93 m (6' 4\"), weight 109.6 kg (241 lb 9.6 oz), SpO2 93 %.  Wt Readings from Last 2 " Encounters:   03/03/18 109.6 kg (241 lb 9.6 oz)   10/25/17 112.9 kg (249 lb)     Vital Signs with Ranges  Temp:  [95.8  F (35.4  C)-97.8  F (36.6  C)] 96.4  F (35.8  C)  Pulse:  [76-80] 77  Heart Rate:  [68-83] 83  Resp:  [16-18] 16  BP: (140-165)/(88-98) 152/92  SpO2:  [93 %-97 %] 93 %    Constitutional: Awake, alert, cooperative, no apparent distress   Lungs: Clear to auscultation bilaterally, no crackles or wheezing   Cardiovascular: Regular rate and rhythm, normal S1 and S2, and no murmur noted   Abdomen: Normal bowel sounds, soft, non-distended, non-tender   Skin: No rashes, no cyanosis, L edema less erythema, tender wo fluctuance   Other:           Data:   All microbiology laboratory data reviewed.  Recent Labs   Lab Test  03/06/18   0621  03/05/18   0724  03/04/18   0637   WBC  5.1  6.9  9.8   HGB  11.0*  11.9*  12.7*   HCT  32.8*  35.4*  37.5*   MCV  86  86  85   PLT  175  173  178     Recent Labs   Lab Test  03/06/18   0621  03/05/18   0724  03/04/18   0637   CR  0.82  0.84  0.92     Recent Labs   Lab Test  03/04/18   0637   SED  17     Recent Labs   Lab Test  03/03/18   1936  03/03/18   1843   CULT  No growth after 4 days  No growth after 4 days

## 2018-03-07 NOTE — PLAN OF CARE
"Problem: Patient Care Overview  Goal: Plan of Care/Patient Progress Review  PT: PT order received and chart reviewed. Noted a surgical shoe was ordered 3/5 for ambulation, but no shoe is in the room. Spoke to pt, and he states he will not wear it unless it is covered by insurance. Left message for  to see if they can find out coverage for this.  Pt reports he has been getting up to/from bathroom indep, \"just fine\". He denies difficulty with mobility except discomfort L foot. He reports he owns a walker, and a Roll about.  He reports he has 1 step to enter the home, then all living on main level, and he will \" be lazy\" at home, and walk very little. Spouse works, but is home in evening. Pt reports \" I don't think I need PT\".  Spoke to nurse, if shoe is covered by insurance, please have pt try it on L foot, wearing regular shoe on R foot so he is level when walking, and have him walk a little in hallway.  If it goes well, we will complete PT order.  Order will remain open for now, until nurse can assess. Addendum: Extensive time spent on pt's behalf attempt to determine if surgical shoe is a covered cost. [employer] states they do not bill for it, it is a stock item sent to the floor, however they could not guarantee how it was coded and if it would be billed separately. Attempted to look into this further with no success. Pt made aware of likely not cost, but unable to guarantee. He was encouraged to call his Ins provider for further info/concerns. Pt will decide if he will wear the shoe.   Addendum: PT will sign off on pt.  If nursing notes a difficulty with household ambulation distances, please re-order PT.     Discharge Planner PT   Patient plan for discharge: home 1-2 days  Current status: ambulating indep to/from bathroom per pt report. Has not tried on surgical shoe as of 1200 hrs today  Barriers to return to prior living situation: none  Recommendations for discharge: Home w/ spouse availability in " evenings  Rationale for recommendations: pt reports indep with short distance gait, recommended wearing surgical shoe to protect, pt undecided if he will  keep it.       Entered by: Arpita Yee 03/07/2018 2:14 PM

## 2018-03-07 NOTE — PLAN OF CARE
Problem: Patient Care Overview  Goal: Plan of Care/Patient Progress Review  Outcome: No Change  Pain: C/o pain in left leg and intermittent headache.  PRN oxycodone and tylenol available for pain  LOC: alert and oriented  Mobility: Independent in room. ( No dizziness.)  Lungs: clear.  97% RA  Tele: A-fib with CVR and BBB  GI: Cons carb diet  : voiding without difficulty  IV: PIV saline locked  Other: Ancef per orders.  Continue Eliquis, zocor, plavix. Lisinopril added today for HTN. Blood sugar checks/insulin per orders.  I.D. and podiatry following.  Afebrile.  Dressing change to left foot wound was done on day shift.  Dressing clean, dry & intact.  Cellulitis left leg

## 2018-03-08 ENCOUNTER — TELEPHONE (OUTPATIENT)
Dept: FAMILY MEDICINE | Facility: CLINIC | Age: 68
End: 2018-03-08

## 2018-03-08 NOTE — DISCHARGE SUMMARY
Admit Date:     03/03/2018   Discharge Date:     03/07/2018      DATE OF ADMISSION:  03/03/2018       DATE OF DISCHARGE:  03/08/2018.        DISCHARGE TO:  Home.      DIAGNOSES:   1.  Left lower extremity cellulitis.   2.  Chronic left foot diabetic ulcer.   3.  Syncope.   4.  Chronic atrial fibrillation.   5.  Diabetes mellitus.   6.  History of coronary artery disease.   7.  Hypothyroidism.   8.  GERD.   9.  Previous ischemic stroke.   10.  Dyslipidemia.   11.  Hypertension.      PROCEDURES:   1.  Infectious Disease consultation.   2.  Podiatry consultation.   3.  Foot x-ray which showed no evidence of osteomyelitis.   4.  Left foot MRI, no evidence of osteomyelitis.   5.  MASON Doppler of left lower extremity.      ALLERGIES:  No known drug allergies.      PENDING TEST RESULTS:  None.      PRIMARY CARE PROVIDER:  Dr. Davion Cordova.      HOSPITAL COURSE:   1.  Left lower extremity cellulitis.  Mr. Elder is a 67-year-old man who presented with a syncopal episode in the bathroom prior to admission.  He was found to have significant cellulitis of his left shin, almost to the knee and down to the ankle.  He was afebrile and his white blood cell count was elevated at 12.3.  He was initially started on Zosyn and vancomycin.  Infectious Disease team was consulted and recommended changing antibiotics to Ancef.  He tolerated this well and the cellulitis continued to decrease.  The swelling and pain also decreased and it was suggested he switch to Keflex prior to discharge.  He will complete a course of Keflex and follow up with his primary care provider.   2.  Syncope.  This occurred in the bathroom prior to admission, the etiology is unclear but it was felt possibly a vasovagal episode, possibly tachycardia related to atrial fibrillation or some low blood pressures related to the infection and sepsis from cellulitis.  He had no further episodes during his hospitalization and was ambulating well prior to discharge.   3.   Chronic atrial fibrillation with some episodes of rapid ventricular response.  His heart rate was well controlled prior to discharge.  He was continued on Eliquis and metoprolol as previously prescribed.     4.  Chronic left plantar diabetic ulcer.  This was evaluated by Podiatry was imaging as described above.  Fortunately, there were no signs of osteomyelitis.  He has been receiving treatment for this for quite a while and unfortunately it has not healed.  It was recommended that he use a special walking shoe and he will continue with wound cares and Podiatry follow-up as previous.  There was some concern on MASON testing for possible decreased blood flow.  Podiatry recommended follow up with Vascular Surgery.  This can be done as an outpatient.   5.  Diabetes mellitus.  The patient apparently has variable control of his blood sugars as an outpatient.  He indicated that he did not like metformin as it seemed to cause gastrointestinal problems.  We therefore held the metformin and increased Lantus.  His blood sugar control will still need further monitoring as an outpatient.      DISCHARGE MEDICATIONS:   1.  Lantus insulin 53 units subq at bedtime.   2.  Keflex 500 mg 4 times a day for 10 days.   3.  Plavix 75 mg a day.   4.  Amlodipine 5 mg a day.   5.  Nitroglycerin 0.4 mg under tongue as needed.   6.  Lisinopril 20 mg twice a day.   7.  Apixaban 5 mg twice a day.   8.  Cholecalciferol 2000 units daily.   9.  Imdur 30 mg a day.   10.  Zocor 40 mg a day.   11.  Metoprolol  mg a day.   12.  Pantoprazole 40 mg a day.   13.  Metformin discontinued.   14.  Levothyroxine 137 mcg daily.      FOLLOW-UP APPOINTMENTS:     1.  With primary physician within 1 week.  He plans to establish primary care closer to his house.  Check blood glucose 3 times a day and call clinic if blood glucose greater than 250 or less than 70.   2.  Follow up with wound care and Podiatry as scheduled.   3.  Follow up with vascular surgery.          JOSLYN JOYNER MD             D: 2018   T: 2018   MT: NANCY      Name:     PORTER YANCEY   MRN:      [id number]        Account:        [id number]   :      1950           Admit Date:     2018                                  Discharge Date: 2018      Document: [id number]

## 2018-03-08 NOTE — TELEPHONE ENCOUNTER
Please contact patient for In-patient follow up.  [phone] (home)     Visit date: 03-   Diagnosis listed: Syncope, unspecified syncope type  Number of visits in past 12 months: 0/3

## 2018-03-08 NOTE — PLAN OF CARE
Problem: Patient Care Overview  Goal: Plan of Care/Patient Progress Review  Outcome: Adequate for Discharge Date Met: 03/07/18  1831: Discharged to home after stating understanding DC instructions including medications and side effects. Transported by wife to home. Home with filled Rx x 1 for keflex.

## 2018-03-09 LAB
BACTERIA SPEC CULT: NO GROWTH
BACTERIA SPEC CULT: NO GROWTH
Lab: NORMAL
Lab: NORMAL
SPECIMEN SOURCE: NORMAL
SPECIMEN SOURCE: NORMAL

## 2018-03-12 ENCOUNTER — TELEPHONE (OUTPATIENT)
Dept: OTHER | Facility: CLINIC | Age: 68
End: 2018-03-12

## 2018-03-12 ENCOUNTER — OFFICE VISIT (OUTPATIENT)
Dept: FAMILY MEDICINE | Facility: CLINIC | Age: 68
End: 2018-03-12
Payer: COMMERCIAL

## 2018-03-12 VITALS
RESPIRATION RATE: 14 BRPM | SYSTOLIC BLOOD PRESSURE: 132 MMHG | TEMPERATURE: 98 F | DIASTOLIC BLOOD PRESSURE: 87 MMHG | WEIGHT: 252 LBS | HEIGHT: 76 IN | BODY MASS INDEX: 30.69 KG/M2 | HEART RATE: 102 BPM

## 2018-03-12 DIAGNOSIS — R35.1 NOCTURIA: ICD-10-CM

## 2018-03-12 DIAGNOSIS — E11.51 TYPE 2 DIABETES MELLITUS WITH DIABETIC PERIPHERAL ANGIOPATHY WITHOUT GANGRENE, UNSPECIFIED LONG TERM INSULIN USE STATUS: ICD-10-CM

## 2018-03-12 DIAGNOSIS — R55 SYNCOPE, UNSPECIFIED SYNCOPE TYPE: ICD-10-CM

## 2018-03-12 DIAGNOSIS — E11.621 DIABETIC ULCER OF LEFT MIDFOOT ASSOCIATED WITH TYPE 2 DIABETES MELLITUS, WITH FAT LAYER EXPOSED (H): ICD-10-CM

## 2018-03-12 DIAGNOSIS — I48.20 CHRONIC ATRIAL FIBRILLATION (H): ICD-10-CM

## 2018-03-12 DIAGNOSIS — L03.116 CELLULITIS OF LEFT LOWER EXTREMITY: ICD-10-CM

## 2018-03-12 DIAGNOSIS — I73.9 PVD (PERIPHERAL VASCULAR DISEASE) (H): Primary | ICD-10-CM

## 2018-03-12 DIAGNOSIS — L97.422 DIABETIC ULCER OF LEFT MIDFOOT ASSOCIATED WITH TYPE 2 DIABETES MELLITUS, WITH FAT LAYER EXPOSED (H): ICD-10-CM

## 2018-03-12 LAB
CREAT UR-MCNC: 310 MG/DL
MICROALBUMIN UR-MCNC: 154 MG/L
MICROALBUMIN/CREAT UR: 49.68 MG/G CR (ref 0–17)

## 2018-03-12 PROCEDURE — 82043 UR ALBUMIN QUANTITATIVE: CPT | Performed by: FAMILY MEDICINE

## 2018-03-12 PROCEDURE — 99214 OFFICE O/P EST MOD 30 MIN: CPT | Performed by: FAMILY MEDICINE

## 2018-03-12 NOTE — NURSING NOTE
"Chief Complaint   Patient presents with     Hospital F/U     3/3/18-3/7/1/8 Syncope       Initial /87 (BP Location: Right arm, Patient Position: Chair, Cuff Size: Adult Large)  Pulse 102  Temp 98  F (36.7  C) (Oral)  Resp 14  Ht 6' 4\" (1.93 m)  Wt 252 lb (114.3 kg)  BMI 30.67 kg/m2 Estimated body mass index is 30.67 kg/(m^2) as calculated from the following:    Height as of this encounter: 6' 4\" (1.93 m).    Weight as of this encounter: 252 lb (114.3 kg).  Medication Reconciliation: complete rt arm Sobeida Valentine MA     "

## 2018-03-12 NOTE — TELEPHONE ENCOUNTER
I called the home number and spoke with the patient's wife. The patient was sleeping and she requested we call back to reach him around 4:00 PM.

## 2018-03-12 NOTE — MR AVS SNAPSHOT
After Visit Summary   3/12/2018    Jayro Elder    MRN: [id number]           Patient Information     Date Of Birth          1950        Visit Information        Provider Department      3/12/2018 11:00 AM Vinicio Cook MD [facility]        Today's Diagnoses     PVD (peripheral vascular disease) (H)    -  1    Cellulitis of left lower extremity        Type 2 diabetes mellitus with diabetic peripheral angiopathy without gangrene, unspecified long term insulin use status (H)        Chronic atrial fibrillation (H)        Diabetic ulcer of left midfoot associated with type 2 diabetes mellitus, with fat layer exposed (H)        Syncope, unspecified syncope type        Nocturia           Follow-ups after your visit        Additional Services     VASCULAR SURGERY REFERRAL       Your provider has referred you to: **Vascular  Services [phone] - Peripheral Artery Disease - Tissue Loss/Ulceration & None - Please Order Appropriate Testing   [email or web address]    Please be aware that coverage of these services is subject to the terms and limitations of your health insurance plan.  Call member services at your health plan with any benefit or coverage questions.      Please bring the following with you to your appointment:    (1) Any X-Rays, CTs or MRIs which have been performed.  Contact the facility where they were done to arrange for  prior to your scheduled appointment.    (2) List of current medications   (3) This referral request   (4) Any documents/labs given to you for this referral                  Follow-up notes from your care team     Return in about 6 weeks (around 4/23/2018).      Who to contact     If you have questions or need follow up information about today's clinic visit or your schedule please contact [facility] directly at [phone].  Normal or non-critical lab and imaging results will be  "communicated to you by [employer], letter or phone within 4 business days after the clinic has received the results. If you do not hear from us within 7 days, please contact the clinic through [email or web address] or phone. If you have a critical or abnormal lab result, we will notify you by phone as soon as possible.  Submit refill requests through [email or web address] or call your pharmacy and they will forward the refill request to us. Please allow 3 business days for your refill to be completed.          Additional Information About Your Visit        [email or web address] Information     [email or web address] lets you send messages to your doctor, view your test results, renew your prescriptions, schedule appointments and more. To sign up, go to [email or web address] . Click on \"Log in\" on the left side of the screen, which will take you to the Welcome page. Then click on \"Sign up Now\" on the right side of the page.     You will be asked to enter the access code listed below, as well as some personal information. Please follow the directions to create your username and password.     Your access code is: [email or web address]  Expires: 2018  3:41 PM     Your access code will  in 90 days. If you need help or a new code, please call your [place] clinic or [phone].        Care EveryWhere ID     This is your Care EveryWhere ID. This could be used by other organizations to access your [place] medical records  [id number]        Your Vitals Were     Pulse Temperature Respirations Height BMI (Body Mass Index)       102 98  F (36.7  C) (Oral) 14 6' 4\" (1.93 m) 30.67 kg/m2        Blood Pressure from Last 3 Encounters:   18 132/87   18 (!) 146/91   18 136/82    Weight from Last 3 Encounters:   18 252 lb (114.3 kg)   18 241 lb 9.6 oz (109.6 kg)   10/25/17 249 lb (112.9 kg)              We Performed the Following     Albumin Random Urine Quantitative with Creat Ratio     VASCULAR SURGERY REFERRAL          Today's Medication Changes        "   These changes are accurate as of 3/12/18 11:59 PM.  If you have any questions, ask your nurse or doctor.               Start taking these medicines.        Dose/Directions    insulin pen needle 31G X 6 MM   Used for:  Type 2 diabetes mellitus with diabetic peripheral angiopathy without gangrene, unspecified long term insulin use status (H)   Started by:  Vinicio Cook MD        Use  as directed.   Quantity:  100 each   Refills:  prn         These medicines have changed or have updated prescriptions.        Dose/Directions    * insulin glargine 100 UNIT/ML injection   Commonly known as:  LANTUS   This may have changed:  Another medication with the same name was added. Make sure you understand how and when to take each.   Changed by:  Vinicio Cook MD        Dose:  53 Units   Inject 53 Units Subcutaneous At Bedtime Daily at 1700   Refills:  0       * insulin glargine 100 UNIT/ML injection   Commonly known as:  LANTUS   This may have changed:  You were already taking a medication with the same name, and this prescription was added. Make sure you understand how and when to take each.   Used for:  Type 2 diabetes mellitus with diabetic peripheral angiopathy without gangrene, unspecified long term insulin use status (H)   Changed by:  Vinicio Cook MD        Dose:  30 Units   Inject 30 Units Subcutaneous 2 times daily   Quantity:  2 mL   Refills:  3       * Notice:  This list has 2 medication(s) that are the same as other medications prescribed for you. Read the directions carefully, and ask your doctor or other care provider to review them with you.         Where to get your medicines      These medications were sent to [facility]/pharmacy #[id number] - [place], [place] - [address] [address]  [address] [address] [address]     Phone:  [phone]     insulin glargine 100 UNIT/ML injection    insulin pen needle 31G X 6 MM                Primary Care Provider Office Phone # Fax #    Davion Cordova PA-C  [phone]       [address] [address]        Equal Access to Services     DANK OLIVEROS : Hadii veronica zamora adia Miller, waonlanda luqbrianna, qaybta kaalmada verona, rivera davisrhonda carlos. So [facility] [phone].    ATENCIÓN: Si habla español, tiene a renteria disposición servicios gratuitos de asistencia lingüística. Llame al [phone].    We comply with applicable federal civil rights laws and [place] laws. We do not discriminate on the basis of race, color, national origin, age, disability, sex, sexual orientation, or gender identity.            Thank you!     Thank you for choosing [facility]  for your care. Our goal is always to provide you with excellent care. Hearing back from our patients is one way we can continue to improve our services. Please take a few minutes to complete the written survey that you may receive in the mail after your visit with us. Thank you!             Your Updated Medication List - Protect others around you: Learn how to safely use, store and throw away your medicines at www.disposemymeds.org.          This list is accurate as of 3/12/18 11:59 PM.  Always use your most recent med list.                   Brand Name Dispense Instructions for use Diagnosis    amLODIPine 5 MG tablet    NORVASC    90 tablet    Take 1 tablet (5 mg) by mouth daily    Coronary artery disease involving native coronary artery of native heart with angina pectoris (H)       cephALEXin 500 MG capsule    KEFLEX    40 capsule    Take 1 capsule (500 mg) by mouth 4 times daily for 10 days    Left leg cellulitis       clopidogrel 75 MG tablet    PLAVIX    90 tablet    Take 1 tablet (75 mg) by mouth daily    Coronary artery disease involving native coronary artery of native heart with other form of angina pectoris (H)       D3 ADULT PO      Take 2,000 Units by mouth every evening        ELIQUIS 5 MG tablet   Generic drug:  apixaban ANTICOAGULANT       Take by mouth 2 times daily        * insulin glargine 100 UNIT/ML injection    LANTUS     Inject 53 Units Subcutaneous At Bedtime Daily at 1700        * insulin glargine 100 UNIT/ML injection    LANTUS    2 mL    Inject 30 Units Subcutaneous 2 times daily    Type 2 diabetes mellitus with diabetic peripheral angiopathy without gangrene, unspecified long term insulin use status (H)       insulin pen needle 31G X 6 MM     100 each    Use  as directed.    Type 2 diabetes mellitus with diabetic peripheral angiopathy without gangrene, unspecified long term insulin use status (H)       isosorbide mononitrate 30 MG 24 hr tablet    IMDUR    90 tablet    Take 1 tablet (30 mg) by mouth daily    Hypertension goal BP (blood pressure) < 140/90       levothyroxine 137 MCG tablet    SYNTHROID/LEVOTHROID    90 tablet    Take 137 mcg by mouth At Bedtime        lisinopril 20 MG tablet    PRINIVIL/ZESTRIL    180 tablet    Take 1 tablet (20 mg) by mouth 2 times daily    Benign essential hypertension       metoprolol succinate 100 MG 24 hr tablet    TOPROL-XL    90 tablet    Take 1 tablet (100 mg) by mouth At Bedtime    Coronary artery disease involving native coronary artery of native heart without angina pectoris       nitroGLYcerin 0.4 MG sublingual tablet    NITROSTAT    50 tablet    Place 1 tablet (0.4 mg) under the tongue every 5 minutes as needed for chest pain    Other chest pain       pantoprazole 40 MG EC tablet    PROTONIX    30 tablet    Take 1 tablet (40 mg) by mouth every morning (before breakfast)    GERD (gastroesophageal reflux disease)       simvastatin 40 MG tablet    ZOCOR    90 tablet    Take 1 tablet (40 mg) by mouth At Bedtime    Coronary artery disease involving native coronary artery of native heart without angina pectoris       * Notice:  This list has 2 medication(s) that are the same as other medications prescribed for you. Read the directions carefully, and ask your doctor or other care provider to review  them with you.

## 2018-03-12 NOTE — PROGRESS NOTES
SUBJECTIVE:   Jayro Elder is a 67 year old male who presents to clinic today for the following health issues:    Hospital Follow-up Visit:    Hospital/Nursing Home/IP Rehab Facility: [facility]  Date of Admission: 3/3/18  Date of Discharge: 3/7/18  Reason(s) for Admission: Syncope             Problems taking medications regularly:  None       Medication changes since discharge: None       Problems adhering to non-medication therapy:  None    Summary of hospitalization:  [facility] discharge summary reviewed    Diagnostic Tests/Treatments reviewed.  Follow up needed: Podiatry  Other Healthcare Providers Involved in Patient s Care:         Wound care  Update since discharge: stable.     Post Discharge Medication Reconciliation: discharge medications reconciled, continue medications without change.  Plan of care communicated with patient     Coding guidelines for this visit:  Type of Medical   Decision Making Face-to-Face Visit       within 7 Days of discharge Face-to-Face Visit        within 14 days of discharge   Moderate Complexity [id number]   High Complexity [id number]          Type 2 diabetes mellitus with diabetic peripheral angiopathy without gangrene, unspecified long term insulin use status (H)  Patient sees Dr Lua endocrinology for diabetic care. He notes his glucose levels have been around 175 and elevated in general. He takes Lantus 53 daily.    Lab Results   Component Value Date    A1C 8.8 03/04/2018    A1C 11.7 04/22/2017    A1C 13.1 03/31/2017    A1C 10.5 12/02/2014    A1C 10.4 03/04/2014       PVD studied last fall, in hosp study comments on changes in comparison  Chronic atrial fibrillation (H) anticoagulated  Diabetic ulcer of left midfoot associated with type 2 diabetes mellitus, with fat layer exposed (H) long standing, sees wound care  Syncope, unspecified syncope type attributed to vasovagal +/- afib+- cellulitis  Cellulitis of left lower extremity improving on  cephalosporin  Past Medical History:   Diagnosis Date     CAD (coronary artery disease) 6/29/05    anterior MI,  PTCA and stent placed in mid LAD     Cardiomyopathy (H)      Cellulitis of left lower extremity 3/13/2018     Diabetic ulcer of left midfoot associated with type 2 diabetes mellitus, with fat layer exposed (H) 3/13/2018     Essential hypertension, benign 11/13/2002     Generalized osteoarthrosis, unspecified site 11/13/2002     Myocardial infarction      Neuropathy      Syncope, unspecified syncope type 3/13/2018     Tobacco use disorder 11/13/2002     Type 2 diabetes mellitus with diabetic peripheral angiopathy without gangrene, unspecified long term insulin use status (H) 3/13/2018     Type II or unspecified type diabetes mellitus without mention of complication, not stated as uncontrolled 7/14/2005       Past Surgical History:   Procedure Laterality Date     ANGIOGRAM  6/29/05    subtotal occ.mid LAD,SUSAN mid LAD     ANGIOGRAM  7/05    mild CAD,patent stent,no flow-limiting lesions,sev.LV dysf.LAD enlarged     ANGIOGRAM  2/09    Sev.single vessel disease,Mod LV dysf.distal LAD 90%,70-75% mid lad just before prev stent,SUSAN to prox.mid LAD, endeavor to distal LAD     ANGIOGRAM  11/13/13    restenosis, stent LAD     C NONSPECIFIC PROCEDURE      Laminectomy x 3 - (1983 x 2 & 1990)     C NONSPECIFIC PROCEDURE Bilateral 1998    Bunionectomy     C NONSPECIFIC PROCEDURE  1959    Gingival surgery at age 9     HEART CATH LEFT HEART CATH  06/13/2017    SUSAN to OM3       Family History   Problem Relation Age of Onset     Cancer - colorectal Sister      Thyroid Disease Brother      Cardiovascular Brother      DIABETES Brother      CANCER Father      tumor in chest and throat     Arthritis Mother      Thyroid Disease Mother      DIABETES Mother      Glaucoma No family hx of      Macular Degeneration No family hx of        Social History   Substance Use Topics     Smoking status: Former Smoker     Packs/day: 1.00      Years: 25.00     Types: Cigarettes     Quit date: 7/25/2005     Smokeless tobacco: Never Used     Alcohol use 2.4 oz/week     4 Shots of liquor per week                  Problem list and histories reviewed & adjusted, as indicated.  Additional history: as documented    Reviewed and updated as needed this visit by clinical staff  Tobacco  Allergies  Meds  Med Hx  Surg Hx  Fam Hx  Soc Hx       Past Medical History:   Diagnosis Date     CAD (coronary artery disease) 6/29/05    anterior MI,  PTCA and stent placed in mid LAD     Cardiomyopathy (H)      Cellulitis of left lower extremity 3/13/2018     Diabetic ulcer of left midfoot associated with type 2 diabetes mellitus, with fat layer exposed (H) 3/13/2018     Essential hypertension, benign 11/13/2002     Generalized osteoarthrosis, unspecified site 11/13/2002     Myocardial infarction      Neuropathy      Syncope, unspecified syncope type 3/13/2018     Tobacco use disorder 11/13/2002     Type II or unspecified type diabetes mellitus without mention of complication, not stated as uncontrolled 7/14/2005       Past Surgical History:   Procedure Laterality Date     ANGIOGRAM  6/29/05    subtotal occ.mid LAD,SUSAN mid LAD     ANGIOGRAM  7/05    mild CAD,patent stent,no flow-limiting lesions,sev.LV dysf.LAD enlarged     ANGIOGRAM  2/09    Sev.single vessel disease,Mod LV dysf.distal LAD 90%,70-75% mid lad just before prev stent,SUSAN to prox.mid LAD, endeavor to distal LAD     ANGIOGRAM  11/13/13    restenosis, stent LAD     C NONSPECIFIC PROCEDURE      Laminectomy x 3 - (1983 x 2 & 1990)     C NONSPECIFIC PROCEDURE Bilateral 1998    Bunionectomy     C NONSPECIFIC PROCEDURE  1959    Gingival surgery at age 9     HEART CATH LEFT HEART CATH  06/13/2017    SUSAN to OM3       Family History   Problem Relation Age of Onset     Cancer - colorectal Sister      Thyroid Disease Brother      Cardiovascular Brother      DIABETES Brother      CANCER Father      tumor in chest and  "throat     Arthritis Mother      Thyroid Disease Mother      DIABETES Mother      Glaucoma No family hx of      Macular Degeneration No family hx of        Social History   Substance Use Topics     Smoking status: Former Smoker     Packs/day: 1.00     Years: 25.00     Types: Cigarettes     Quit date: 7/25/2005     Smokeless tobacco: Never Used     Alcohol use 2.4 oz/week     4 Shots of liquor per week       Reviewed and updated as needed this visit by Provider         ROS:  Positive for headaches, nocturia occasionally 3    This document serves as a record of the services and decisions personally performed and made by Vinicio Cook MD. It was created on his behalf by Che Rivera, a trained medical scribe.  The creation of this document is based on the scribe's personal observations and the provider's statements to the medical scribe.  Che Rivera, March 12, 2018 11:22 AM    OBJECTIVE:     /87 (BP Location: Right arm, Patient Position: Chair, Cuff Size: Adult Large)  Pulse 102  Temp 98  F (36.7  C) (Oral)  Resp 14  Ht 6' 4\" (1.93 m)  Wt 252 lb (114.3 kg)  BMI 30.67 kg/m2  Body mass index is 30.67 kg/(m^2).  Neck supple  Affect flat  Tr edema left leg, erythema retreated from pen line        ASSESSMENT/PLAN:   ASSESSMENT / PLAN:  (I73.9) PVD (peripheral vascular disease) (H)  (primary encounter diagnosis)  Comment: although recently studied, given changes in newer screen and non healing ulcer with cellulitsis  Plan: VASCULAR SURGERY REFERRAL        Re assess    (L03.116) Cellulitis of left lower extremity  Comment responsive  Plan: complete antibiotics. He will be assessed by multiple providers in the next few weeks: that may be sufficient    (E11.51) Type 2 diabetes mellitus with diabetic peripheral angiopathy without gangrene, unspecified long term insulin use status (H)  Comment: he complains about current needle  size. Therapies recently adjusted. He reports -150  Plan: insulin glargine " (LANTUS) 100 UNIT/ML         injection, insulin pen needle 31G X 6 MM,         Albumin Random Urine Quantitative with Creat         Ratio        Is needed    (I48.2) Chronic atrial fibrillation (H)  Comment: anticoagulated  Plan:     (E11.621,  L97.422) Diabetic ulcer of left midfoot associated with type 2 diabetes mellitus, with fat layer exposed (H)  Comment: under wound care, no osteo  Plan:     (R55) Syncope, unspecified syncope type  Comment: monitored, evaluated in hospital  Plan:     (R35.1) Nocturia  Comment: discussed options  Plan: he is not concerned      RTC 6 weeks    Vinicio Cook MD        RTC 6 weeks     The information in this document, created by the medical scribe for me, accurately reflects the services I personally performed and the decisions made by me. I have reviewed and approved this document for accuracy prior to leaving the patient care area.  Vinicio Cook MD March 12, 2018 11:22 AM    Vinicio Cook MD  [facility]

## 2018-03-12 NOTE — TELEPHONE ENCOUNTER
I spoke with the patient regarding scheduling a consult. Patient was not interested in going anywhere but [place] for an appointment and was not happy with the appointment availability at that location. Patient stated he wasn't interested in scheduling at this time.

## 2018-03-12 NOTE — TELEPHONE ENCOUNTER
"Pt referred to [facility] by Vinicio Cook MD for PVD with tissue loss.     3-4-18 MASON, Dr. King notes:  \"HISTORY: Non-palpable pulses in setting of diabetic wound/infection.\"  \"FINDINGS:   Right MASON: 1.25, previously 1.28.  Left MASON: 0.99, previously 1.3.   Waveforms: Right waveforms are triphasic. Left waveforms are biphasic.   IMPRESSION:   1. Normal right MASON without evidence of arterial insufficiency.  2. Normal left MASON, however waveforms are biphasic today compared to  triphasic previously. Consider Doppler ultrasound for further  Evaluation.\"    MR left foot 3-4-18 Dr. Hernandes notes  \"plantar first MPJ  ulcer in setting of impressive leg cellulitis.\"   \"IMPRESSION:  1. Advanced first MTP joint degenerative changes. There is some  subchondral edema as result but no definite lytic or destructive  lesions or bone marrow findings to suggest osteomyelitis.  2. No evidence of abscess.\"    Pt needs to be scheduled for consult with vascular surgery.  Will route to scheduling to coordinate an appointment as soon as possible.    MICHELLE HardinN, RN    "

## 2018-03-13 PROBLEM — R07.9 CHEST PAIN: Status: RESOLVED | Noted: 2017-06-15 | Resolved: 2018-03-13

## 2018-03-13 PROBLEM — R55 SYNCOPE, UNSPECIFIED SYNCOPE TYPE: Status: ACTIVE | Noted: 2018-03-13

## 2018-03-13 PROBLEM — R07.9 ACUTE CHEST PAIN: Status: RESOLVED | Noted: 2017-06-15 | Resolved: 2018-03-13

## 2018-03-13 PROBLEM — L03.90 CELLULITIS: Status: RESOLVED | Noted: 2018-03-03 | Resolved: 2018-03-13

## 2018-03-13 PROBLEM — R80.9 MICROALBUMINURIA: Status: ACTIVE | Noted: 2018-03-13

## 2018-05-03 ENCOUNTER — HOSPITAL ENCOUNTER (OUTPATIENT)
Dept: WOUND CARE | Facility: CLINIC | Age: 68
Discharge: HOME | End: 2018-05-03
Attending: PHYSICIAN ASSISTANT | Admitting: PHYSICIAN ASSISTANT
Payer: MEDICARE

## 2018-05-03 VITALS
TEMPERATURE: 97.4 F | RESPIRATION RATE: 20 BRPM | SYSTOLIC BLOOD PRESSURE: 143 MMHG | HEART RATE: 74 BPM | DIASTOLIC BLOOD PRESSURE: 76 MMHG

## 2018-05-03 PROCEDURE — 11042 DBRDMT SUBQ TIS 1ST 20SQCM/<: CPT | Performed by: PHYSICIAN ASSISTANT

## 2018-05-03 PROCEDURE — 11042 DBRDMT SUBQ TIS 1ST 20SQCM/<: CPT

## 2018-05-03 PROCEDURE — A6261 WOUND FILLER GEL/PASTE /OZ: HCPCS

## 2018-05-03 NOTE — MR AVS SNAPSHOT
"                  MRN:[id number]                      After Visit Summary   5/3/2018    Jayro Elder    MRN: [id number]           Visit Information        Provider Department      5/3/2018 10:30 AM Layla Costa PA-C [facility] [place]          Further instructions from your care team             [facility]  [address] [facility] [address], [place] [address]  Appointment Phone [phone] Nurse Advisors [phone]    Jayro Elder      1950    Wound Dressing Change:Left Foot  Cleanse wound with soap and water  Cover wound with Iodosorb gel on guaze  Cover wound with sriram  Change dressing daily     Layla Costa PA-C. May 3, 2018    Call us at [phone] if you have any questions about your wounds, have redness or swelling around your wound, have a fever of 101 or greater or if you have any other problems or concerns. We answer the phone Monday through Friday 8 am to 4 pm, please leave a message as we check the voicemail frequently throughout the day.     Follow up with Provider - Dr. Mike Donaldson Information     Anika lets you send messages to your doctor, view your test results, renew your prescriptions, schedule appointments and more. To sign up, go to [email or web address] . Click on \"Log in\" on the left side of the screen, which will take you to the Welcome page. Then click on \"Sign up Now\" on the right side of the page.     You will be asked to enter the access code listed below, as well as some personal information. Please follow the directions to create your username and password.     Your access code is: [email or web address]  Expires: 2018  3:41 PM     Your access code will  in 90 days. If you need help or a new code, please call your [place] clinic or [phone].        Care EveryWhere ID     This is your Care EveryWhere ID. This could be used by other organizations to access your [place] " medical records  [id number]        Equal Access to Services     DANK OLIVEROS : Alan Miller, raymond gardner, rivera erwin. So [facility] [phone].    ATENCIÓN: Si habla español, tiene a renteria disposición servicios gratuitos de asistencia lingüística. Llame al [phone].    We comply with applicable federal civil rights laws and [place] laws. We do not discriminate on the basis of race, color, national origin, age, disability, sex, sexual orientation, or gender identity.

## 2018-05-03 NOTE — DISCHARGE INSTRUCTIONS
[facility]  [address] [facility] [address], [place] [address]  Appointment Phone [phone] Nurse Advisors [phone]    Jayro Elder      1950    Wound Dressing Change:Left Foot  Cleanse wound with soap and water  Cover wound with Iodosorb gel on guaze  Cover wound with sriram  Change dressing daily     Layla Costa PA-C. May 3, 2018    Call us at [phone] if you have any questions about your wounds, have redness or swelling around your wound, have a fever of 101 or greater or if you have any other problems or concerns. We answer the phone Monday through Friday 8 am to 4 pm, please leave a message as we check the voicemail frequently throughout the day.     Follow up with Provider - Dr. Mckeon

## 2018-05-09 NOTE — PROGRESS NOTES
[facility]      HISTORY OF PRESENT ILLNESS: Mr. Jayro Elder is a 67-year-old gentleman who returns to our clinic for bilateral foot wounds due to diabetes and plantar warts. He has been seeing Dr. Glez who has initiated pulsed dye laser treatments. However, he reports that Dr. Glez does not perform any kind of debridement on these lesions and Jayro is bothered by the pain that the lesions and surrounding hyperkeratosis cause him.       Jayro has had imaging that was negative for bone infection. Also has had recent ABIs that were within normal limits.       OFFLOADING: Doesn't utilize any offloading currently. Owns a DH2 shoe. Tried to get custom insoles and reported that they were not covered by his insurance.       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 After informed consent was obtained, a surgical debridement was performed using a 10 blade down to and including subcutaneous tissue of <20 cm. Hemostasis was achieved with pressure. The patient tolerated the procedure well.       ASSESSMENT plantar verruca and diabetic ulcer in a patient with diabetes and neuropathy      PLAN:   5. Continue PDL with Dr. Glez as he  needs treatment to target the wart in order to resolve this issue  6. Follow up with Dr. Mckeon to assess for further treatment options.      FOLLOW-UP: JIMMY SEAMAN PA-C

## 2018-05-24 DIAGNOSIS — I25.10 CORONARY ARTERY DISEASE INVOLVING NATIVE CORONARY ARTERY OF NATIVE HEART WITHOUT ANGINA PECTORIS: ICD-10-CM

## 2018-05-24 RX ORDER — METOPROLOL SUCCINATE 100 MG/1
100 TABLET, EXTENDED RELEASE ORAL AT BEDTIME
Qty: 90 TABLET | Refills: 3 | Status: ON HOLD | OUTPATIENT
Start: 2018-05-24 | End: 2018-09-17

## 2018-05-24 RX ORDER — SIMVASTATIN 40 MG
40 TABLET ORAL AT BEDTIME
Qty: 90 TABLET | Refills: 3 | Status: ON HOLD | OUTPATIENT
Start: 2018-05-24 | End: 2019-01-24

## 2018-05-25 ENCOUNTER — HOSPITAL ENCOUNTER (OUTPATIENT)
Dept: WOUND CARE | Facility: CLINIC | Age: 68
Discharge: HOME | End: 2018-05-25
Attending: PODIATRIST | Admitting: PODIATRIST
Payer: MEDICARE

## 2018-05-25 VITALS — SYSTOLIC BLOOD PRESSURE: 126 MMHG | TEMPERATURE: 97.8 F | DIASTOLIC BLOOD PRESSURE: 86 MMHG | HEART RATE: 75 BPM

## 2018-05-25 DIAGNOSIS — E11.621 DIABETIC ULCER OF LEFT MIDFOOT ASSOCIATED WITH TYPE 2 DIABETES MELLITUS, WITH FAT LAYER EXPOSED (H): ICD-10-CM

## 2018-05-25 DIAGNOSIS — L97.422 DIABETIC ULCER OF LEFT MIDFOOT ASSOCIATED WITH TYPE 2 DIABETES MELLITUS, WITH FAT LAYER EXPOSED (H): ICD-10-CM

## 2018-05-25 PROCEDURE — A6022 COLLAGEN DRSG>16<=48 SQ IN: HCPCS

## 2018-05-25 PROCEDURE — 11042 DBRDMT SUBQ TIS 1ST 20SQCM/<: CPT | Performed by: PODIATRIST

## 2018-05-25 PROCEDURE — 11042 DBRDMT SUBQ TIS 1ST 20SQCM/<: CPT

## 2018-05-25 NOTE — MR AVS SNAPSHOT
MRN:[id number]                      After Visit Summary   5/25/2018    Jayro Elder    MRN: [id number]           Visit Information        Provider Department      5/25/2018 10:15 AM Татьяна Mckeon DPM, Podiatry/Foot and Ankle Surgery [facility] [place]          Further instructions from your care team             [facility] [place]  [address] [place] [address], [facility] [address]  Appointment Phone [phone] [facility] [phone]    Jayro Elder      1950    Wound Dressing Change:Left 1st Met Head and Right 3rd Toe  Cleanse wound with soap and water  Cover wound with Loren cut to size of wound (this will dissolve into the wound)  Cover wound with gauze or band aid  Change dressing daily.  Compression:   You have a compression wrap Spandigrip F is supposed to be removed at night and put back on first thing in the morning.   Please remove compression dressing if toes turn blue and/or tingle and can not be relieved by raising the leg for one hour.      FERNANDA Adames.P.FABIOLA.. May 25, 2018    Call us at [phone] if you have any questions about your wounds, have redness or swelling around your wound, have a fever of 101 or greater or if you have any other problems or concerns. We answer the phone Monday through Friday 8 am to 4 pm, please leave a message as we check the voicemail frequently throughout the day.     Follow up with Provider - 3 weeks   FOOT CARE NURSES  If you are interested in having a foot care nurse come out to your   home, please call one of these contacts for more information:  [facility]  [phone] [facility]  [phone]   [facility]  [phone]  [place] [phone]  [place]   [facility]  [phone]  At [facility] [phone]       HAMMERTOES  Hammertoe is a contracture (bending) of one or both joints of  the second, third, fourth, or fifth (little) toes. This abnormal bending can put pressure on the toe when wearing shoes, causing problems to develop.  Hammertoes usually start out as mild deformities and get progressively worse over time. In the earlier stages, hammertoes are flexible and the symptoms can often be managed with noninvasive measures. But if left untreated, hammertoes can become more rigid and will not respond to non-surgical treatment.  Because of the progressive nature of hammertoes, they should receive early attention. Hammertoes never get better without some kind of intervention.  CAUSES  The most common cause of hammertoe is a muscle/tendon imbalance. This imbalance, which leads to a bending of the toe, results from mechanical (structural) changes in the foot that occur over time in some people.  Hammertoes may be aggravated by shoes that don t fit properly. A hammertoe may result if a toe is too long and is forced into a cramped position when a tight shoe is worn.  Occasionally, hammertoe is the result of an earlier trauma to the toe. In some people, hammertoes are inherited.  SYMPTOMS  Pain or irritation of the affected toe when wearing shoes.   Corns and calluses (a buildup of skin) on the toe, between two toes, or on the ball of the foot. Corns are caused by constant friction against the shoe. They may be soft or hard, depending upon their location.   Inflammation, redness, or a burning sensation   Contracture of the toe   In more severe cases of hammertoe, open sores may form.   DIAGNOSIS  Although hammertoes are readily apparent, to arrive at a diagnosis the foot and ankle surgeon will obtain a thorough history of your symptoms and examine your foot. During the physical examination, the doctor may attempt to reproduce your symptoms by manipulating your foot and will study the contractures of the toes. In addition, the foot and ankle surgeon may take x-rays to determine the degree of the  deformities and assess any changes that may have occurred.   Hammertoes are progressive - they don t go away by themselves and usually they will get worse over time. However, not all cases are alike - some hammertoes progress more rapidly than others. Once your foot and ankle surgeon has evaluated your hammertoes, a treatment plan can be developed that is suited to your needs.  NON-SURGICAL TREATMENT  There is a variety of treatment options for hammertoe. The treatment your foot and ankle surgeon selects will depend upon the severity of your hammertoe and other factors.  A number of non-surgical measures can be undertaken:  Padding corns and calluses. Your foot and ankle surgeon can provide or prescribe pads designed to shield corns from irritation. If you want to try over-the-counter pads, avoid the medicated types. Medicated pads are generally not recommended because they may contain a small amount of acid that can be harmful. Consult your surgeon about this option.   Changes in shoewear. Avoid shoes with pointed toes, shoes that are too short, or shoes with high heels - conditions that can force your toe against the front of the shoe. Instead, choose comfortable shoes with a deep, roomy toe box and heels no higher than two inches.   Orthotic devices. A custom orthotic device placed in your shoe may help control the muscle/tendon imbalance.   Injection therapy. Corticosteroid injections are sometimes used to ease pain and inflammation caused by hammertoe.   Medications. Oral nonsteroidal anti-inflammatory drugs (NSAIDs), such as ibuprofen, may be recommended to reduce pain and inflammation.   Splinting/strapping. Splints or small straps may be applied by the surgeon to realign the bent toe.   Exercises:   1. The Toe Tap  Stand flat on the ground in your bare feet. Raise all of your toes up off the ground as high as you can. Then starting with the little toes, slowly press them down to the ground. After the big  toes are on the ground, start over by raising all of them up off the ground again. This motion is similar to tapping your fingers on a desk. Repeat this ten times.     2. Interlocking your Fingers and Toes  Cross your right foot over your knee and place the fingers of your left hand between your toes. Squeeze your toes together, pinching your fingers between them. Spread the toes apart and squeeze them together again. Repeat this ten times then do the other foot. Like most exercises, this will get easier the more you do it. If you are having a lot of difficulty with this exercise, start with just your index finger between your first and second toe, then later add your middle finger between your second and third toes, and so on until you can fit all your fingers between your toes. Do this ten times on each foot. Eventually you will be able to spread your toes apart without using your fingers.    3. Gripping the Floor   the floor by pressing the pads of your toes (not the tips) into the floor without curling your toes. Relax and repeat this ten times. If your shoes have the proper amount of depth, you should be able to do this with shoes on.    HAMMERTOE TOE SURGERY   Hammertoe surgery is complex. The surgical procedure is an attempt to help the toe lay in a better position. Nearly every structure in the toe will be cut including the tendons, ligaments, skin and bone. Hammertoes are a complex deformity and final toe position is difficult to predict. The only sure way to position a toe is to fuse all three digital joints. That will not happen as some degree of toe motion is needed for walking. The toe may not be completely reduced as the surrounding skin and other structures may not allow the toe to return to a normal position. The tendons on adjacent toes may need to be cut at the time of the original or subsequent surgeries, as interconnections exist between the toes. The toe may drift after surgery. Stiffness  may develop leading to new areas of pressure.   Future shoe choices will be critical in allowing the surgery to provide comfort. The toes will still hurt if shoes rub. The original pain may also persist as other foot problems may be contributing to the current pain. The toe may or may not be pinned in place. External pins would require complete avoidance of water on the foot for six weeks. The pin would be removed about six weeks after the surgery. Strict attention to protection is critical. The pin could get bumped or loosen resulting in early removal. Removal might be necessary before the bone heals which would negatively affect the final surgical outcome and toe allignment.       Care EveryWhere ID     This is your Care EveryWhere ID. This could be used by other organizations to access your [facility] medical records  [id number]        Equal Access to Services     DANK OLIVEROS : Alan Miller, raymond gardner, arpit rhoades, rivera gonzalez. So [facility] [phone].    ATENCIÓN: Si habla español, tiene a renteria disposición servicios gratuitos de asistencia lingüística. Llame al [phone].    We comply with applicable federal civil rights laws and [place] laws. We do not discriminate on the basis of race, color, national origin, age, disability, sex, sexual orientation, or gender identity.

## 2018-05-25 NOTE — PROGRESS NOTES
[facility] [place] Progress Note    Subject: Patient was seen at wound center.  He is here for follow-up on ulcerations to both feet. He has been doing iodoform to the areas. He also sees Dr. Glez which she did biopsy on the left foot confirming a wart and gets this lasered. Denies fever, nausea, vomiting, shortness of breath. Presents to clinic in regular tennis shoes with no offloading.    PMH:   Past Medical History:   Diagnosis Date     CAD (coronary artery disease) 6/29/05    anterior MI,  PTCA and stent placed in mid LAD     Cardiomyopathy (H)      Cellulitis of left lower extremity 3/13/2018     Diabetic ulcer of left midfoot associated with type 2 diabetes mellitus, with fat layer exposed (H) 3/13/2018     Essential hypertension, benign 11/13/2002     Generalized osteoarthrosis, unspecified site 11/13/2002     Microalbuminuria 3/13/2018    X1     Myocardial infarction      Neuropathy      Syncope, unspecified syncope type 3/13/2018     Tobacco use disorder 11/13/2002     Type 2 diabetes mellitus with diabetic peripheral angiopathy without gangrene, unspecified long term insulin use status (H) 3/13/2018     Type II or unspecified type diabetes mellitus without mention of complication, not stated as uncontrolled 7/14/2005       Social Hx:   Social History     Social History     Marital status:      Spouse name: N/A     Number of children: N/A     Years of education: N/A     Occupational History     Not on file.     Social History Main Topics     Smoking status: Former Smoker     Packs/day: 1.00     Years: 25.00     Types: Cigarettes     Quit date: 7/25/2005     Smokeless tobacco: Never Used     Alcohol use 2.4 oz/week     4 Shots of liquor per week     Drug use: No     Sexual activity: Yes     Partners: Female     Other Topics Concern     Parent/Sibling W/ Cabg, Mi Or Angioplasty Before 65f 55m? Yes     Caffeine Concern No     3 cups daily     Special Diet Yes     watching carb  intake     Exercise No     some walking     Social History Narrative       Surgical Hx:   Past Surgical History:   Procedure Laterality Date     ANGIOGRAM  6/29/05    subtotal occ.mid LAD,SUSAN mid LAD     ANGIOGRAM  7/05    mild CAD,patent stent,no flow-limiting lesions,sev.LV dysf.LAD enlarged     ANGIOGRAM  2/09    Sev.single vessel disease,Mod LV dysf.distal LAD 90%,70-75% mid lad just before prev stent,SUSAN to prox.mid LAD, endeavor to distal LAD     ANGIOGRAM  11/13/13    restenosis, stent LAD     C NONSPECIFIC PROCEDURE      Laminectomy x 3 - (1983 x 2 & 1990)     C NONSPECIFIC PROCEDURE Bilateral 1998    Bunionectomy     C NONSPECIFIC PROCEDURE  1959    Gingival surgery at age 9     HEART CATH LEFT HEART CATH  06/13/2017    SUSAN to OM3       Allergies:    Allergies   Allergen Reactions     No Known Allergies        Medications:   Current Outpatient Prescriptions   Medication     amLODIPine (NORVASC) 5 MG tablet     apixaban ANTICOAGULANT (ELIQUIS) 5 MG tablet     Cholecalciferol (D3 ADULT PO)     clopidogrel (PLAVIX) 75 MG tablet     insulin glargine (LANTUS) 100 UNIT/ML injection     insulin glargine (LANTUS) 100 UNIT/ML injection     insulin pen needle 31G X 6 MM     isosorbide mononitrate (IMDUR) 30 MG 24 hr tablet     levothyroxine (SYNTHROID, LEVOTHROID) 137 MCG tablet     lisinopril (PRINIVIL/ZESTRIL) 20 MG tablet     metoprolol succinate (TOPROL-XL) 100 MG 24 hr tablet     nitroglycerin (NITROSTAT) 0.4 MG sublingual tablet     pantoprazole (PROTONIX) 40 MG enteric coated tablet     simvastatin (ZOCOR) 40 MG tablet     No current facility-administered medications for this encounter.          Objective:  Vitals:  /86  Pulse 75  Temp 97.8  F (36.6  C) (Temporal)    A1C: 8.8 (3/2018)    General:  Patient is alert and orientated.  NAD     Dermatologic: Full-thickness ulcerations to the right distal third toe. Upon debridement this measures 0.7 x 0.7 x 0.1 cm. Second ulceration to the left plantar first  metatarsal head measures 2.3 x 2.5 x 0.1 cm. No purulent drainage, surrounding erythema, or malodor noted. Fat layer exposed but no necrosis of muscle or bone noted. Localized preoperative hyperkeratotic lesions noted to the distal right second toe and left third and fourth toes.     Vascular: DP & PT pulses are faintly bilaterally.   edema and varicosities noted.  CFT and skin temperature is normal to both lower extremities.     Neurologic: Lower extremity sensation is diminished to feet.      Musculoskeletal: Patient is ambulatory without assistive device or brace.  Semi-rigid contractures of the toes 2 through 4 bilaterally.    Imaging:  MASON:  Normal left MASON, however waveforms are biphasic compared to triphasic previously.     MRI: Advanced first MTP joint degenerative changes. There is some subchondral edema as result but no definite lytic or destructive lesions or bone marrow findings to suggest osteomyelitis.  No evidence of abscess.    Assessment: A 67-year-old diabetic male with peripheral vascular disease, ulcerations both feet with fat layer exposed, hammertoes, pre-ulcerative calluses.    Plan:  The wounds are debrided. Preoperative calluses were debrided. Recommended hyacinth dressing changes daily to the wounds. Given his hammertoes we discussed that we could try doing a tenotomy in the clinic setting to try to help decrease pressure to the ends of the toes which are prone to ulceration. He is going to think about it. We also discussed offloading the wound on the left plantar foot and talked about modifying his orthotic or a diabetic healing shoe. He declines any intervention at this time. Discussed the importance of offloading again he declined any further treatments for this. He was given information on where to get nail care done. He was also given spanogrip to try to help with swelling of the feet and legs. He will follow up in 1 month for reassessment.    Procedure:  After verbal consent, per  protocol lidocaine was applied to the wound. Excisional debridement was performed on ulcer.   #15 blade was used to debride ulcer down to and including subcutaneous tissue. Bleeding controlled with light pressure.   No drainage noted. Dry dressing applied to foot.  Patient tolerated procedure well.      Татьяна Mckeon DPM, Podiatry/Foot and Ankle Surgery

## 2018-05-25 NOTE — DISCHARGE INSTRUCTIONS
[facility]  [address] [facility] [address], [facility] [address]  Appointment Phone [phone] Nurse Advisors [phone]    Jayro Elder      1950    Wound Dressing Change:Left 1st Met Head and Right 3rd Toe  Cleanse wound with soap and water  Cover wound with Loren cut to size of wound (this will dissolve into the wound)  Cover wound with gauze or band aid  Change dressing daily.  Compression:   You have a compression wrap Spandigrip F is supposed to be removed at night and put back on first thing in the morning.   Please remove compression dressing if toes turn blue and/or tingle and can not be relieved by raising the leg for one hour.      FERNANDA Adames.P.M.. May 25, 2018    Call us at [phone] if you have any questions about your wounds, have redness or swelling around your wound, have a fever of 101 or greater or if you have any other problems or concerns. We answer the phone Monday through Friday 8 am to 4 pm, please leave a message as we check the voicemail frequently throughout the day.     Follow up with Provider - 3 weeks   FOOT CARE NURSES  If you are interested in having a foot care nurse come out to your   home, please call one of these contacts for more information:  [facility]  [phone] [facility]  [phone]   [facility]  [phone]  [facility] [phone]  [place]   [facility]  [phone]  At [facility] [phone]       HAMMERTOES  Hammertoe is a contracture (bending) of one or both joints of the second, third, fourth, or fifth (little) toes. This abnormal bending can put pressure on the toe when wearing shoes, causing problems to develop.  Hammertoes usually start out as mild deformities and get progressively worse over time. In the earlier stages, hammertoes are flexible and the symptoms can often be managed with noninvasive measures. But  if left untreated, hammertoes can become more rigid and will not respond to non-surgical treatment.  Because of the progressive nature of hammertoes, they should receive early attention. Hammertoes never get better without some kind of intervention.  CAUSES  The most common cause of hammertoe is a muscle/tendon imbalance. This imbalance, which leads to a bending of the toe, results from mechanical (structural) changes in the foot that occur over time in some people.  Hammertoes may be aggravated by shoes that don t fit properly. A hammertoe may result if a toe is too long and is forced into a cramped position when a tight shoe is worn.  Occasionally, hammertoe is the result of an earlier trauma to the toe. In some people, hammertoes are inherited.  SYMPTOMS  Pain or irritation of the affected toe when wearing shoes.   Corns and calluses (a buildup of skin) on the toe, between two toes, or on the ball of the foot. Corns are caused by constant friction against the shoe. They may be soft or hard, depending upon their location.   Inflammation, redness, or a burning sensation   Contracture of the toe   In more severe cases of hammertoe, open sores may form.   DIAGNOSIS  Although hammertoes are readily apparent, to arrive at a diagnosis the foot and ankle surgeon will obtain a thorough history of your symptoms and examine your foot. During the physical examination, the doctor may attempt to reproduce your symptoms by manipulating your foot and will study the contractures of the toes. In addition, the foot and ankle surgeon may take x-rays to determine the degree of the deformities and assess any changes that may have occurred.   Hammertoes are progressive - they don t go away by themselves and usually they will get worse over time. However, not all cases are alike - some hammertoes progress more rapidly than others. Once your foot and ankle surgeon has evaluated your hammertoes, a treatment plan can be developed that is  suited to your needs.  NON-SURGICAL TREATMENT  There is a variety of treatment options for hammertoe. The treatment your foot and ankle surgeon selects will depend upon the severity of your hammertoe and other factors.  A number of non-surgical measures can be undertaken:  Padding corns and calluses. Your foot and ankle surgeon can provide or prescribe pads designed to shield corns from irritation. If you want to try over-the-counter pads, avoid the medicated types. Medicated pads are generally not recommended because they may contain a small amount of acid that can be harmful. Consult your surgeon about this option.   Changes in shoewear. Avoid shoes with pointed toes, shoes that are too short, or shoes with high heels - conditions that can force your toe against the front of the shoe. Instead, choose comfortable shoes with a deep, roomy toe box and heels no higher than two inches.   Orthotic devices. A custom orthotic device placed in your shoe may help control the muscle/tendon imbalance.   Injection therapy. Corticosteroid injections are sometimes used to ease pain and inflammation caused by hammertoe.   Medications. Oral nonsteroidal anti-inflammatory drugs (NSAIDs), such as ibuprofen, may be recommended to reduce pain and inflammation.   Splinting/strapping. Splints or small straps may be applied by the surgeon to realign the bent toe.   Exercises:   1. The Toe Tap  Stand flat on the ground in your bare feet. Raise all of your toes up off the ground as high as you can. Then starting with the little toes, slowly press them down to the ground. After the big toes are on the ground, start over by raising all of them up off the ground again. This motion is similar to tapping your fingers on a desk. Repeat this ten times.     2. Interlocking your Fingers and Toes  Cross your right foot over your knee and place the fingers of your left hand between your toes. Squeeze your toes together, pinching your fingers between  them. Spread the toes apart and squeeze them together again. Repeat this ten times then do the other foot. Like most exercises, this will get easier the more you do it. If you are having a lot of difficulty with this exercise, start with just your index finger between your first and second toe, then later add your middle finger between your second and third toes, and so on until you can fit all your fingers between your toes. Do this ten times on each foot. Eventually you will be able to spread your toes apart without using your fingers.    3. Gripping the Floor   the floor by pressing the pads of your toes (not the tips) into the floor without curling your toes. Relax and repeat this ten times. If your shoes have the proper amount of depth, you should be able to do this with shoes on.    HAMMERTOE TOE SURGERY   Hammertoe surgery is complex. The surgical procedure is an attempt to help the toe lay in a better position. Nearly every structure in the toe will be cut including the tendons, ligaments, skin and bone. Hammertoes are a complex deformity and final toe position is difficult to predict. The only sure way to position a toe is to fuse all three digital joints. That will not happen as some degree of toe motion is needed for walking. The toe may not be completely reduced as the surrounding skin and other structures may not allow the toe to return to a normal position. The tendons on adjacent toes may need to be cut at the time of the original or subsequent surgeries, as interconnections exist between the toes. The toe may drift after surgery. Stiffness may develop leading to new areas of pressure.   Future shoe choices will be critical in allowing the surgery to provide comfort. The toes will still hurt if shoes rub. The original pain may also persist as other foot problems may be contributing to the current pain. The toe may or may not be pinned in place. External pins would require complete avoidance of  water on the foot for six weeks. The pin would be removed about six weeks after the surgery. Strict attention to protection is critical. The pin could get bumped or loosen resulting in early removal. Removal might be necessary before the bone heals which would negatively affect the final surgical outcome and toe allignment.

## 2018-05-30 DIAGNOSIS — I10 HYPERTENSION GOAL BP (BLOOD PRESSURE) < 140/90: ICD-10-CM

## 2018-05-30 RX ORDER — ISOSORBIDE MONONITRATE 30 MG/1
30 TABLET, EXTENDED RELEASE ORAL DAILY
Qty: 90 TABLET | Refills: 1 | Status: SHIPPED | OUTPATIENT
Start: 2018-05-30 | End: 2018-11-26

## 2018-06-01 NOTE — ADDENDUM NOTE
Encounter addended by: Татьяна Mckeon DPM, Podiatry/Foot and Ankle Surgery on: 6/1/2018 12:31 PM<BR>     Actions taken: Sign clinical note

## 2018-06-18 DIAGNOSIS — I25.118 CORONARY ARTERY DISEASE INVOLVING NATIVE CORONARY ARTERY OF NATIVE HEART WITH OTHER FORM OF ANGINA PECTORIS (H): ICD-10-CM

## 2018-06-18 RX ORDER — CLOPIDOGREL BISULFATE 75 MG/1
75 TABLET ORAL DAILY
Qty: 90 TABLET | Refills: 2 | Status: ON HOLD | OUTPATIENT
Start: 2018-06-18 | End: 2019-01-25

## 2018-06-22 ENCOUNTER — HOSPITAL ENCOUNTER (OUTPATIENT)
Dept: WOUND CARE | Facility: CLINIC | Age: 68
Discharge: HOME | End: 2018-06-22
Attending: PODIATRIST | Admitting: PODIATRIST
Payer: MEDICARE

## 2018-06-22 VITALS — HEART RATE: 76 BPM | SYSTOLIC BLOOD PRESSURE: 139 MMHG | TEMPERATURE: 97.6 F | DIASTOLIC BLOOD PRESSURE: 87 MMHG

## 2018-06-22 DIAGNOSIS — L89.893 PRESSURE ULCER OF TOE OF RIGHT FOOT, STAGE 3 (H): ICD-10-CM

## 2018-06-22 PROCEDURE — 11042 DBRDMT SUBQ TIS 1ST 20SQCM/<: CPT

## 2018-06-22 PROCEDURE — 11042 DBRDMT SUBQ TIS 1ST 20SQCM/<: CPT | Performed by: PODIATRIST

## 2018-06-22 RX ORDER — SILVER SULFADIAZINE 10 MG/G
CREAM TOPICAL DAILY
Qty: 50 G | Refills: 3 | Status: ON HOLD | OUTPATIENT
Start: 2018-06-22 | End: 2019-01-05

## 2018-06-22 NOTE — DISCHARGE INSTRUCTIONS
[facility]  [address] [facility] [address], [place] [address]  Appointment Phone [phone] Nurse Advisors [phone]    Jayro Elder      1950    Wound Dressing Change:Right 3rd Toe  Cleanse wound with: soap and water  Cover wound with silvadene cream  Cover wound with gauze  Change dressing daily.  Apply Urea cream to left foot daily.  Compression:   You have a compression wrap Spandigrip E is supposed to be removed at night and put back on first thing in the morning.   Please remove compression dressing if toes turn blue and/or tingle and can not be relieved by raising the leg for one hour.        Татьяна Mckeon, D.P.M.. June 22, 2018    Call us at [phone] if you have any questions about your wounds, have redness or swelling around your wound, have a fever of 101 or greater or if you have any other problems or concerns. We answer the phone Monday through Friday 8 am to 4 pm, please leave a message as we check the voicemail frequently throughout the day.     Follow up with Provider - 5 weeks

## 2018-06-22 NOTE — MR AVS SNAPSHOT
MRN:[id number]                      After Visit Summary   6/22/2018    Jayro Elder    MRN: [id number]           Visit Information        Provider Department      6/22/2018 10:45 AM Татьяна Mckeon DPM, Podiatry/Foot and Ankle Surgery [facility]          Further instructions from your care team             [facility]  [address] [facility] [address], [place] [address]  Appointment Phone [phone] Nurse Advisors [phone]    Jayro Elder      1950    Wound Dressing Change:Right 3rd Toe  Cleanse wound with: soap and water  Cover wound with silvadene cream  Cover wound with gauze  Change dressing daily.  Apply Urea cream to left foot daily.  Compression:   You have a compression wrap Spandigrip E is supposed to be removed at night and put back on first thing in the morning.   Please remove compression dressing if toes turn blue and/or tingle and can not be relieved by raising the leg for one hour.        Татьяна Mckeon, FERNANDA.P.M.. June 22, 2018    Call us at [phone] if you have any questions about your wounds, have redness or swelling around your wound, have a fever of 101 or greater or if you have any other problems or concerns. We answer the phone Monday through Friday 8 am to 4 pm, please leave a message as we check the voicemail frequently throughout the day.     Follow up with Provider - 5 weeks         Care EveryWhere ID     This is your Care EveryWhere ID. This could be used by other organizations to access your [place] medical records  [id number]        Equal Access to Services     [facility], [facility] CampusDARLING : Hadii veronica spaino Soemily, waaxda luqadaha, qaybta kaalmada rivera rhoades idijame gonzalez. So [facility] [phone].    ATENCIÓN: Si habla español, tiene a renteria disposición servicios gratuitos de asistencia lingüística. Llame al [phone].    We comply with applicable federal civil rights  laws and [place] laws. We do not discriminate on the basis of race, color, national origin, age, disability, sex, sexual orientation, or gender identity.

## 2018-06-22 NOTE — PROGRESS NOTES
[facility] [place] Progress Note    Subject: Patient was seen at wound center.  Here for follow-up on bilateral foot ulcers. Denies fever, nausea, vomiting. Is very frustrated today with his insurance and the rate of his foot healing.    PMH:   Past Medical History:   Diagnosis Date     CAD (coronary artery disease) 6/29/05    anterior MI,  PTCA and stent placed in mid LAD     Cardiomyopathy (H)      Cellulitis of left lower extremity 3/13/2018     Diabetic ulcer of left midfoot associated with type 2 diabetes mellitus, with fat layer exposed (H) 3/13/2018     Essential hypertension, benign 11/13/2002     Generalized osteoarthrosis, unspecified site 11/13/2002     Microalbuminuria 3/13/2018    X1     Myocardial infarction      Neuropathy      Syncope, unspecified syncope type 3/13/2018     Tobacco use disorder 11/13/2002     Type 2 diabetes mellitus with diabetic peripheral angiopathy without gangrene, unspecified long term insulin use status (H) 3/13/2018     Type II or unspecified type diabetes mellitus without mention of complication, not stated as uncontrolled 7/14/2005       Social Hx:   Social History     Social History     Marital status:      Spouse name: N/A     Number of children: N/A     Years of education: N/A     Occupational History     Not on file.     Social History Main Topics     Smoking status: Former Smoker     Packs/day: 1.00     Years: 25.00     Types: Cigarettes     Quit date: 7/25/2005     Smokeless tobacco: Never Used     Alcohol use 2.4 oz/week     4 Shots of liquor per week     Drug use: No     Sexual activity: Yes     Partners: Female     Other Topics Concern     Parent/Sibling W/ Cabg, Mi Or Angioplasty Before 65f 55m? Yes     Caffeine Concern No     3 cups daily     Special Diet Yes     watching carb intake     Exercise No     some walking     Social History Narrative       Surgical Hx:   Past Surgical History:   Procedure Laterality Date     ANGIOGRAM  6/29/05     subtotal occ.mid LAD,SUSAN mid LAD     ANGIOGRAM  7/05    mild CAD,patent stent,no flow-limiting lesions,sev.LV dysf.LAD enlarged     ANGIOGRAM  2/09    Sev.single vessel disease,Mod LV dysf.distal LAD 90%,70-75% mid lad just before prev stent,SUSAN to prox.mid LAD, endeavor to distal LAD     ANGIOGRAM  11/13/13    restenosis, stent LAD     C NONSPECIFIC PROCEDURE      Laminectomy x 3 - (1983 x 2 & 1990)     C NONSPECIFIC PROCEDURE Bilateral 1998    Bunionectomy     C NONSPECIFIC PROCEDURE  1959    Gingival surgery at age 9     HEART CATH LEFT HEART CATH  06/13/2017    SUSAN to OM3       Allergies:    Allergies   Allergen Reactions     No Known Allergies        Medications:   Current Outpatient Prescriptions   Medication     amLODIPine (NORVASC) 5 MG tablet     apixaban ANTICOAGULANT (ELIQUIS) 5 MG tablet     Cholecalciferol (D3 ADULT PO)     clopidogrel (PLAVIX) 75 MG tablet     insulin glargine (LANTUS) 100 UNIT/ML injection     insulin glargine (LANTUS) 100 UNIT/ML injection     insulin pen needle 31G X 6 MM     isosorbide mononitrate (IMDUR) 30 MG 24 hr tablet     levothyroxine (SYNTHROID, LEVOTHROID) 137 MCG tablet     lisinopril (PRINIVIL/ZESTRIL) 20 MG tablet     metoprolol succinate (TOPROL-XL) 100 MG 24 hr tablet     nitroglycerin (NITROSTAT) 0.4 MG sublingual tablet     pantoprazole (PROTONIX) 40 MG enteric coated tablet     simvastatin (ZOCOR) 40 MG tablet     No current facility-administered medications for this encounter.          Objective:  Vitals:  /87  Pulse 76  Temp 97.6  F (36.4  C) (Temporal)    A1C: 8.8 (3/2018)     General:  Patient is alert and orientated.  NAD      Dermatologic: Full-thickness ulcerations to the right distal third toe. Upon debridement this measures 1.0 x 0.5 x 0.2 cm. Second ulceration to the left plantar first metatarsal head measures appears to be healed. There is some hyperkeratotic tissue that was debrided without incident. This seems to be recurrent. No purulent  drainage, surrounding erythema, or malodor noted. Fat layer exposed but no necrosis of muscle or bone noted. Localized preoperative hyperkeratotic lesions noted to the distal right second toe and left third and fourth toes.      Vascular: DP & PT pulses are faintly bilaterally.   edema and varicosities noted.  CFT and skin temperature is normal to both lower extremities.      Neurologic: Lower extremity sensation is diminished to feet.       Musculoskeletal: Patient is ambulatory without assistive device or brace.  Semi-rigid contractures of the toes 2 through 4 bilaterally.     Imaging:  MASON:  Normal left MASON, however waveforms are biphasic compared to triphasic previously.      MRI: Advanced first MTP joint degenerative changes. There is some subchondral edema as result but no definite lytic or destructive lesions or bone marrow findings to suggest osteomyelitis.  No evidence of abscess.     Assessment: A 67-year-old diabetic male with peripheral vascular disease, ulcerations both feet with fat layer exposed, hammertoes, pre-ulcerative calluses.     Plan:  The wounds are debrided. Preulcerative calluses were debrided. Again recommended offloading shoe. Felted foam was placed to try to offload these areas. Given his hammertoes we discussed that we could try doing a tenotomy in the clinic setting to try to help decrease pressure to the ends of the toes which are prone to ulceration. He is going to think about it. Will switch to urea cream to the left foot as this pre-ulcerative hyperkeratotic lesion appears to be recurrent this should try to help with that. We'll do Silvadene cream to the right third toe. He was given information on where to get nail care done. He was also given spanogrip to try to help with swelling of the feet and legs. He will follow up in 1 month for reassessment.     Procedure:  After verbal consent, per protocol lidocaine was applied to the wound. Excisional debridement was performed on ulcer.    #15 blade was used to debride ulcer down to and including subcutaneous tissue. Bleeding controlled with light pressure.   No drainage noted. Dry dressing applied to foot.  Patient tolerated procedure well.    Татьяна Mckeon DPM, Podiatry/Foot and Ankle Surgery

## 2018-08-28 ENCOUNTER — TELEPHONE (OUTPATIENT)
Dept: WOUND CARE | Facility: CLINIC | Age: 68
End: 2018-08-28

## 2018-08-28 NOTE — TELEPHONE ENCOUNTER
"Patient calling to have our office summit an order to [facility] for more roll gauze. Pt has not been since since 6/22/18 and was instructed to follow up in 5 weeks. Patient had an appt in July which he cancelled. Pt informed that he needs regular ongoing wound care appointments so our office can assess his wounds and determine the best care plan. Patient refused to make an appointment stating \"I know what my wound needs\" and hung up the phone.   "

## 2018-08-30 DIAGNOSIS — I25.119 CORONARY ARTERY DISEASE INVOLVING NATIVE CORONARY ARTERY OF NATIVE HEART WITH ANGINA PECTORIS (H): ICD-10-CM

## 2018-08-30 RX ORDER — AMLODIPINE BESYLATE 5 MG/1
5 TABLET ORAL DAILY
Qty: 90 TABLET | Refills: 0 | Status: SHIPPED | OUTPATIENT
Start: 2018-08-30 | End: 2018-11-30

## 2018-09-05 ENCOUNTER — HOSPITAL ENCOUNTER (OUTPATIENT)
Facility: CLINIC | Age: 68
End: 2018-09-05
Attending: ORTHOPAEDIC SURGERY | Admitting: ORTHOPAEDIC SURGERY
Payer: MEDICARE

## 2018-09-06 ENCOUNTER — APPOINTMENT (OUTPATIENT)
Dept: GENERAL RADIOLOGY | Facility: CLINIC | Age: 68
DRG: 617 | End: 2018-09-06
Attending: EMERGENCY MEDICINE
Payer: MEDICARE

## 2018-09-06 ENCOUNTER — APPOINTMENT (OUTPATIENT)
Dept: MRI IMAGING | Facility: CLINIC | Age: 68
DRG: 617 | End: 2018-09-06
Attending: EMERGENCY MEDICINE
Payer: MEDICARE

## 2018-09-06 ENCOUNTER — APPOINTMENT (OUTPATIENT)
Dept: CT IMAGING | Facility: CLINIC | Age: 68
DRG: 617 | End: 2018-09-06
Attending: EMERGENCY MEDICINE
Payer: MEDICARE

## 2018-09-06 ENCOUNTER — HOSPITAL ENCOUNTER (INPATIENT)
Facility: CLINIC | Age: 68
LOS: 11 days | Discharge: HOME | DRG: 617 | End: 2018-09-17
Attending: EMERGENCY MEDICINE | Admitting: INTERNAL MEDICINE
Payer: MEDICARE

## 2018-09-06 DIAGNOSIS — E13.621 DIABETIC ULCER OF LEFT MIDFOOT ASSOCIATED WITH DIABETES MELLITUS OF OTHER TYPE, UNSPECIFIED ULCER STAGE (H): ICD-10-CM

## 2018-09-06 DIAGNOSIS — R41.82 ALTERED MENTAL STATUS, UNSPECIFIED ALTERED MENTAL STATUS TYPE: ICD-10-CM

## 2018-09-06 DIAGNOSIS — R78.81 BACTEREMIA: Primary | ICD-10-CM

## 2018-09-06 DIAGNOSIS — L97.429 DIABETIC ULCER OF LEFT MIDFOOT ASSOCIATED WITH DIABETES MELLITUS OF OTHER TYPE, UNSPECIFIED ULCER STAGE (H): ICD-10-CM

## 2018-09-06 DIAGNOSIS — I25.10 CORONARY ARTERY DISEASE INVOLVING NATIVE CORONARY ARTERY OF NATIVE HEART WITHOUT ANGINA PECTORIS: ICD-10-CM

## 2018-09-06 DIAGNOSIS — L03.119 CELLULITIS OF FOOT: ICD-10-CM

## 2018-09-06 DIAGNOSIS — E03.9 HYPOTHYROIDISM, UNSPECIFIED TYPE: ICD-10-CM

## 2018-09-06 LAB
ALBUMIN SERPL-MCNC: 3.5 G/DL (ref 3.4–5)
ALBUMIN UR-MCNC: NEGATIVE MG/DL
ALP SERPL-CCNC: 79 U/L (ref 40–150)
ALT SERPL W P-5'-P-CCNC: 23 U/L (ref 0–70)
ANION GAP SERPL CALCULATED.3IONS-SCNC: 9 MMOL/L (ref 3–14)
APPEARANCE UR: CLEAR
AST SERPL W P-5'-P-CCNC: 12 U/L (ref 0–45)
BASOPHILS # BLD AUTO: 0 10E9/L (ref 0–0.2)
BASOPHILS NFR BLD AUTO: 0.2 %
BILIRUB SERPL-MCNC: 2.3 MG/DL (ref 0.2–1.3)
BILIRUB UR QL STRIP: NEGATIVE
BUN SERPL-MCNC: 12 MG/DL (ref 7–30)
CALCIUM SERPL-MCNC: 8.1 MG/DL (ref 8.5–10.1)
CHLORIDE SERPL-SCNC: 102 MMOL/L (ref 94–109)
CO2 BLDCOV-SCNC: 23 MMOL/L (ref 21–28)
CO2 SERPL-SCNC: 24 MMOL/L (ref 20–32)
COLOR UR AUTO: YELLOW
CREAT SERPL-MCNC: 0.85 MG/DL (ref 0.66–1.25)
DIFFERENTIAL METHOD BLD: ABNORMAL
EOSINOPHIL # BLD AUTO: 0 10E9/L (ref 0–0.7)
EOSINOPHIL NFR BLD AUTO: 0 %
ERYTHROCYTE [DISTWIDTH] IN BLOOD BY AUTOMATED COUNT: 12.6 % (ref 10–15)
GFR SERPL CREATININE-BSD FRML MDRD: 90 ML/MIN/1.7M2
GLUCOSE BLDC GLUCOMTR-MCNC: 205 MG/DL (ref 70–99)
GLUCOSE SERPL-MCNC: 214 MG/DL (ref 70–99)
GLUCOSE UR STRIP-MCNC: >499 MG/DL
HBA1C MFR BLD: 9.4 % (ref 0–5.6)
HCT VFR BLD AUTO: 40.8 % (ref 40–53)
HGB BLD-MCNC: 13.9 G/DL (ref 13.3–17.7)
HGB UR QL STRIP: NEGATIVE
IMM GRANULOCYTES # BLD: 0 10E9/L (ref 0–0.4)
IMM GRANULOCYTES NFR BLD: 0.3 %
INR PPP: 1.36 (ref 0.86–1.14)
KETONES UR STRIP-MCNC: 20 MG/DL
LACTATE BLD-SCNC: 1.6 MMOL/L (ref 0.7–2.1)
LACTATE BLD-SCNC: 1.7 MMOL/L (ref 0.7–2)
LEUKOCYTE ESTERASE UR QL STRIP: NEGATIVE
LYMPHOCYTES # BLD AUTO: 0.2 10E9/L (ref 0.8–5.3)
LYMPHOCYTES NFR BLD AUTO: 2.1 %
MCH RBC QN AUTO: 29.6 PG (ref 26.5–33)
MCHC RBC AUTO-ENTMCNC: 34.1 G/DL (ref 31.5–36.5)
MCV RBC AUTO: 87 FL (ref 78–100)
MONOCYTES # BLD AUTO: 0.6 10E9/L (ref 0–1.3)
MONOCYTES NFR BLD AUTO: 5.2 %
MUCOUS THREADS #/AREA URNS LPF: PRESENT /LPF
NEUTROPHILS # BLD AUTO: 10.5 10E9/L (ref 1.6–8.3)
NEUTROPHILS NFR BLD AUTO: 92.2 %
NITRATE UR QL: NEGATIVE
NRBC # BLD AUTO: 0 10*3/UL
NRBC BLD AUTO-RTO: 0 /100
PCO2 BLDV: 37 MM HG (ref 40–50)
PH BLDV: 7.41 PH (ref 7.32–7.43)
PH UR STRIP: 5 PH (ref 5–7)
PLATELET # BLD AUTO: 180 10E9/L (ref 150–450)
PO2 BLDV: 34 MM HG (ref 25–47)
POTASSIUM SERPL-SCNC: 3.7 MMOL/L (ref 3.4–5.3)
PROT SERPL-MCNC: 6.4 G/DL (ref 6.8–8.8)
RBC # BLD AUTO: 4.69 10E12/L (ref 4.4–5.9)
RBC #/AREA URNS AUTO: 0 /HPF (ref 0–2)
SAO2 % BLDV FROM PO2: 66 %
SODIUM SERPL-SCNC: 135 MMOL/L (ref 133–144)
SOURCE: ABNORMAL
SP GR UR STRIP: 1.01 (ref 1–1.03)
TSH SERPL DL<=0.005 MIU/L-ACNC: 0.4 MU/L (ref 0.4–4)
UROBILINOGEN UR STRIP-MCNC: 0 MG/DL (ref 0–2)
WBC # BLD AUTO: 11.4 10E9/L (ref 4–11)
WBC #/AREA URNS AUTO: 1 /HPF (ref 0–5)

## 2018-09-06 PROCEDURE — 83036 HEMOGLOBIN GLYCOSYLATED A1C: CPT | Performed by: EMERGENCY MEDICINE

## 2018-09-06 PROCEDURE — 82803 BLOOD GASES ANY COMBINATION: CPT

## 2018-09-06 PROCEDURE — 25000128 H RX IP 250 OP 636: Performed by: INTERNAL MEDICINE

## 2018-09-06 PROCEDURE — 96367 TX/PROPH/DG ADDL SEQ IV INF: CPT

## 2018-09-06 PROCEDURE — 87077 CULTURE AEROBIC IDENTIFY: CPT | Performed by: EMERGENCY MEDICINE

## 2018-09-06 PROCEDURE — 12000000 ZZH R&B MED SURG/OB

## 2018-09-06 PROCEDURE — A9270 NON-COVERED ITEM OR SERVICE: HCPCS | Mod: GY | Performed by: INTERNAL MEDICINE

## 2018-09-06 PROCEDURE — 83605 ASSAY OF LACTIC ACID: CPT | Performed by: EMERGENCY MEDICINE

## 2018-09-06 PROCEDURE — 96365 THER/PROPH/DIAG IV INF INIT: CPT

## 2018-09-06 PROCEDURE — 99223 1ST HOSP IP/OBS HIGH 75: CPT | Mod: AI | Performed by: INTERNAL MEDICINE

## 2018-09-06 PROCEDURE — 99285 EMERGENCY DEPT VISIT HI MDM: CPT | Mod: 25

## 2018-09-06 PROCEDURE — 71046 X-RAY EXAM CHEST 2 VIEWS: CPT

## 2018-09-06 PROCEDURE — 73720 MRI LWR EXTREMITY W/O&W/DYE: CPT | Mod: LT

## 2018-09-06 PROCEDURE — 96361 HYDRATE IV INFUSION ADD-ON: CPT

## 2018-09-06 PROCEDURE — 25000128 H RX IP 250 OP 636: Performed by: EMERGENCY MEDICINE

## 2018-09-06 PROCEDURE — 87800 DETECT AGNT MULT DNA DIREC: CPT | Performed by: EMERGENCY MEDICINE

## 2018-09-06 PROCEDURE — 87070 CULTURE OTHR SPECIMN AEROBIC: CPT | Performed by: EMERGENCY MEDICINE

## 2018-09-06 PROCEDURE — 87086 URINE CULTURE/COLONY COUNT: CPT | Performed by: EMERGENCY MEDICINE

## 2018-09-06 PROCEDURE — 80053 COMPREHEN METABOLIC PANEL: CPT | Performed by: EMERGENCY MEDICINE

## 2018-09-06 PROCEDURE — 81001 URINALYSIS AUTO W/SCOPE: CPT | Performed by: EMERGENCY MEDICINE

## 2018-09-06 PROCEDURE — 73630 X-RAY EXAM OF FOOT: CPT | Mod: LT

## 2018-09-06 PROCEDURE — A9585 GADOBUTROL INJECTION: HCPCS | Performed by: INTERNAL MEDICINE

## 2018-09-06 PROCEDURE — 70450 CT HEAD/BRAIN W/O DYE: CPT

## 2018-09-06 PROCEDURE — 87186 SC STD MICRODIL/AGAR DIL: CPT | Performed by: EMERGENCY MEDICINE

## 2018-09-06 PROCEDURE — 25000132 ZZH RX MED GY IP 250 OP 250 PS 637: Mod: GY | Performed by: INTERNAL MEDICINE

## 2018-09-06 PROCEDURE — 83036 HEMOGLOBIN GLYCOSYLATED A1C: CPT | Performed by: INTERNAL MEDICINE

## 2018-09-06 PROCEDURE — 85610 PROTHROMBIN TIME: CPT | Performed by: EMERGENCY MEDICINE

## 2018-09-06 PROCEDURE — 93005 ELECTROCARDIOGRAM TRACING: CPT

## 2018-09-06 PROCEDURE — 00000146 ZZHCL STATISTIC GLUCOSE BY METER IP

## 2018-09-06 PROCEDURE — 85025 COMPLETE CBC W/AUTO DIFF WBC: CPT | Performed by: EMERGENCY MEDICINE

## 2018-09-06 PROCEDURE — 84443 ASSAY THYROID STIM HORMONE: CPT | Performed by: EMERGENCY MEDICINE

## 2018-09-06 PROCEDURE — 36415 COLL VENOUS BLD VENIPUNCTURE: CPT | Performed by: EMERGENCY MEDICINE

## 2018-09-06 PROCEDURE — 96366 THER/PROPH/DIAG IV INF ADDON: CPT

## 2018-09-06 PROCEDURE — 87040 BLOOD CULTURE FOR BACTERIA: CPT | Performed by: EMERGENCY MEDICINE

## 2018-09-06 PROCEDURE — 83605 ASSAY OF LACTIC ACID: CPT

## 2018-09-06 RX ORDER — SODIUM CHLORIDE 9 MG/ML
INJECTION, SOLUTION INTRAVENOUS CONTINUOUS
Status: DISCONTINUED | OUTPATIENT
Start: 2018-09-06 | End: 2018-09-06

## 2018-09-06 RX ORDER — METOPROLOL SUCCINATE 100 MG/1
100 TABLET, EXTENDED RELEASE ORAL AT BEDTIME
Status: DISCONTINUED | OUTPATIENT
Start: 2018-09-06 | End: 2018-09-12

## 2018-09-06 RX ORDER — ACETAMINOPHEN 325 MG/1
650 TABLET ORAL EVERY 4 HOURS PRN
Status: DISCONTINUED | OUTPATIENT
Start: 2018-09-06 | End: 2018-09-11

## 2018-09-06 RX ORDER — SIMVASTATIN 40 MG
40 TABLET ORAL AT BEDTIME
Status: DISCONTINUED | OUTPATIENT
Start: 2018-09-06 | End: 2018-09-17 | Stop reason: HOSPADM

## 2018-09-06 RX ORDER — AMLODIPINE BESYLATE 5 MG/1
5 TABLET ORAL DAILY
Status: DISCONTINUED | OUTPATIENT
Start: 2018-09-07 | End: 2018-09-17 | Stop reason: HOSPADM

## 2018-09-06 RX ORDER — NITROGLYCERIN 0.4 MG/1
0.4 TABLET SUBLINGUAL EVERY 5 MIN PRN
Status: DISCONTINUED | OUTPATIENT
Start: 2018-09-06 | End: 2018-09-17 | Stop reason: HOSPADM

## 2018-09-06 RX ORDER — SODIUM CHLORIDE 9 MG/ML
INJECTION, SOLUTION INTRAVENOUS CONTINUOUS
Status: ACTIVE | OUTPATIENT
Start: 2018-09-06 | End: 2018-09-07

## 2018-09-06 RX ORDER — PANTOPRAZOLE SODIUM 40 MG/1
40 TABLET, DELAYED RELEASE ORAL
Status: DISCONTINUED | OUTPATIENT
Start: 2018-09-07 | End: 2018-09-17 | Stop reason: HOSPADM

## 2018-09-06 RX ORDER — ISOSORBIDE MONONITRATE 30 MG/1
30 TABLET, EXTENDED RELEASE ORAL DAILY
Status: DISCONTINUED | OUTPATIENT
Start: 2018-09-07 | End: 2018-09-17 | Stop reason: HOSPADM

## 2018-09-06 RX ORDER — NICOTINE POLACRILEX 4 MG
15-30 LOZENGE BUCCAL
Status: DISCONTINUED | OUTPATIENT
Start: 2018-09-06 | End: 2018-09-17 | Stop reason: HOSPADM

## 2018-09-06 RX ORDER — LISINOPRIL 20 MG/1
20 TABLET ORAL 2 TIMES DAILY
Status: DISCONTINUED | OUTPATIENT
Start: 2018-09-06 | End: 2018-09-17 | Stop reason: HOSPADM

## 2018-09-06 RX ORDER — NALOXONE HYDROCHLORIDE 0.4 MG/ML
.1-.4 INJECTION, SOLUTION INTRAMUSCULAR; INTRAVENOUS; SUBCUTANEOUS
Status: DISCONTINUED | OUTPATIENT
Start: 2018-09-06 | End: 2018-09-15

## 2018-09-06 RX ORDER — DEXTROSE MONOHYDRATE 25 G/50ML
25-50 INJECTION, SOLUTION INTRAVENOUS
Status: DISCONTINUED | OUTPATIENT
Start: 2018-09-06 | End: 2018-09-17 | Stop reason: HOSPADM

## 2018-09-06 RX ORDER — ONDANSETRON 4 MG/1
4 TABLET, ORALLY DISINTEGRATING ORAL EVERY 6 HOURS PRN
Status: DISCONTINUED | OUTPATIENT
Start: 2018-09-06 | End: 2018-09-17 | Stop reason: HOSPADM

## 2018-09-06 RX ORDER — GADOBUTROL 604.72 MG/ML
15 INJECTION INTRAVENOUS ONCE
Status: COMPLETED | OUTPATIENT
Start: 2018-09-06 | End: 2018-09-06

## 2018-09-06 RX ORDER — ONDANSETRON 2 MG/ML
4 INJECTION INTRAMUSCULAR; INTRAVENOUS EVERY 6 HOURS PRN
Status: DISCONTINUED | OUTPATIENT
Start: 2018-09-06 | End: 2018-09-17 | Stop reason: HOSPADM

## 2018-09-06 RX ADMIN — LISINOPRIL 20 MG: 20 TABLET ORAL at 23:07

## 2018-09-06 RX ADMIN — TAZOBACTAM SODIUM AND PIPERACILLIN SODIUM 3.38 G: 375; 3 INJECTION, SOLUTION INTRAVENOUS at 23:34

## 2018-09-06 RX ADMIN — SIMVASTATIN 40 MG: 40 TABLET, FILM COATED ORAL at 23:08

## 2018-09-06 RX ADMIN — SODIUM CHLORIDE: 9 INJECTION, SOLUTION INTRAVENOUS at 23:06

## 2018-09-06 RX ADMIN — GADOBUTROL 12 ML: 604.72 INJECTION INTRAVENOUS at 22:41

## 2018-09-06 RX ADMIN — TAZOBACTAM SODIUM AND PIPERACILLIN SODIUM 4.5 G: 500; 4 INJECTION, SOLUTION INTRAVENOUS at 17:12

## 2018-09-06 RX ADMIN — LEVOTHYROXINE SODIUM 137 MCG: 25 TABLET ORAL at 23:08

## 2018-09-06 RX ADMIN — VANCOMYCIN HYDROCHLORIDE 2750 MG: 1 INJECTION, POWDER, LYOPHILIZED, FOR SOLUTION INTRAVENOUS at 17:56

## 2018-09-06 RX ADMIN — METOPROLOL SUCCINATE 100 MG: 100 TABLET, EXTENDED RELEASE ORAL at 23:07

## 2018-09-06 RX ADMIN — SODIUM CHLORIDE 3000 ML: 9 INJECTION, SOLUTION INTRAVENOUS at 15:44

## 2018-09-06 ASSESSMENT — ENCOUNTER SYMPTOMS
COUGH: 0
NAUSEA: 1
WOUND: 1
ARTHRALGIAS: 1
RHINORRHEA: 0

## 2018-09-06 NOTE — ED NOTES
Bed: ED02  Expected date: 9/6/18  Expected time: 3:08 PM  Means of arrival: Ambulance  Comments:  JIMI

## 2018-09-06 NOTE — LETTER
Transition Communication Hand-off for Care Transitions to Next Level of Care Provider    Name: Jayro Elder  : 1950  MRN #: [id number]  Primary Care Provider: Davion Cordova  Primary Care MD Name: magda cordova  [facility] [address]  [facility] [address]  [facility]  Reason for Hospitalization:  Cellulitis of foot [L03.119]  Altered mental status, unspecified altered mental status type [R41.82]  Hypothyroidism, unspecified type [E03.9]  Diabetic ulcer of left midfoot associated with diabetes mellitus of other type, unspecified ulcer stage (H) [E13.621, L97.429]  Admit Date/Time: 2018  3:09 PM  Discharge Date: 18  Payor Source: Payor: BCBS / Plan: BCBS PLATINUM BLUE / Product Type: PPO /     Readmission Assessment Measure ([place]) Risk Score/category: ELEVATED           Reason for Communication Hand-off Referral: Difficulty understanding plan of care  Multiple providers/specialties  Non-adherence to plan of care related to:  Other DM management.  Concerned about compliance with dressing changes and weight bear status    Discharge Plan:       Concern for non-adherence with plan of care:   YES  Discharge Needs Assessment:  Needs       Most Recent Value    Equipment Currently Used at Home walker, rolling, cane, straight    Transportation Available family or friend will provide    # of Referrals Placed by [facility]          Already enrolled in Tele-monitoring program and name of program:  na  Follow-up specialty is recommended: Yes    Follow-up plan:  Future Appointments  Date Time Provider Department Center   2018 1:45 PM [facility]   2018 1:45 PM [facility]   2018 1:45 PM [facility]       Any outstanding tests or procedures:               Further instructions from your care team      Plan of care for wound located on Left 1st MTP and right 3rd toe amputation site:  "Cleanse with Microklenz, pat dry. Could also use soap and water after discharge. Apply Vaseline gauze, dry 4x4 gauze, Kerlix wrap. Secure with Ace bandage. Change once daily.     [facility] Wednesday 9/19/18 0830. Future follow up appointments will be made at this appointment.      Patient may heel weightbear as tolerated in post op shoes, should avoid pressure on midfoot/forefoot.              Key Recommendations:  Pt is admitted with cellulitis and positive blood cultures.  He was scheduled for an amputation of his toe, but they moved it up to this hospitalization.  His a1c is 9.4.  He is compliant with his glargine.  However, it is expensive, but his endocrinologist gives it to him for free.  He has requested financial support from the drug company, but hasn't heard back yet. He isn't a candidate for resources.  He rarely checks his blood glucose since it's hard to get a sample with his calloused fingers.  He stopped taking metformin d/t the side effects of bloating.  He follows with Dr Scott endocrinology and would like to stay with him.   Pt does have WB restrictions.  However, he did declines Home care support: Per PT Therapy note: .  Pt refuses all mobility training and recommendations, states \"It doesn't hurt, I'll be fine\". Refused any further mobility training or practice navigating stairs.  Pt requesting to discontinue all therapy services, refused FWW for home.  Will complete PT orders.  Cardiology saw pt and recommends Xochitl stress test which they recommend f/u with cards and medical management.  ID recommends IV ABX outpatient infusion for 2 more days.  Elevated concern about compliance with dressing changes on foot, dm management and care of self for health management.     Rosy Morrow    AVS/Discharge Summary is the source of truth; this is a helpful guide for improved communication of patient story            "

## 2018-09-06 NOTE — IP AVS SNAPSHOT
"[place] [address] MEDICAL SURGICAL: [phone]                                              INTERAGENCY TRANSFER FORM - PHYSICIAN ORDERS   2018                    Hospital Admission Date: 2018  PORTER YANCEY   : 1950  Sex: Male        Attending Provider: Davion Mir MD     Allergies:  No Known Allergies    Infection:  None   Service:  GENERAL MEDI    Ht:  1.93 m (6' 4\")   Wt:  115.1 kg (253 lb 11.2 oz)   Admission Wt:  113.4 kg (250 lb)    BMI:  30.88 kg/m 2   BSA:  2.48 m 2            Patient PCP Information     Provider PCP Type    Davion Cordova PA-C General      ED Clinical Impression     Diagnosis Description Comment Added By Time Added    Altered mental status, unspecified altered mental status type [R41.82] Altered mental status, unspecified altered mental status type [R41.82]  Manoj Shannon MD 2018  7:12 PM    Diabetic ulcer of left midfoot associated with diabetes mellitus of other type, unspecified ulcer stage (H) [E13.621, L97.429] Diabetic ulcer of left midfoot associated with diabetes mellitus of other type, unspecified ulcer stage (H) [E13.621, L97.429]  Manoj Shannon MD 2018  7:12 PM    Hypothyroidism, unspecified type [E03.9] Hypothyroidism, unspecified type [E03.9]  Manoj Shannon MD 2018  7:12 PM    Cellulitis of foot [L03.119] Cellulitis of foot [L03.119]  Manoj Shannon MD 2018  7:47 PM      Hospital Problems as of 2018              Priority Class Noted POA    Cellulitis Medium  2018 Yes      Non-Hospital Problems as of 2018              Priority Class Noted    Generalized osteoarthrosis, unspecified site Medium  2002    Tobacco use disorder Medium  2002    Hypertension goal BP (blood pressure) < 140/90 Medium  2002    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    Abdominal pain, right upper quadrant Medium  2004    Diabetes mellitus, type 2 (H) Medium  " 7/14/2005    Cardiovascular disease Medium  7/14/2005    CARDIOVASCULAR SCREENING; LDL GOAL LESS THAN 100 Medium  10/31/2010    Health Care Home Medium  11/18/2013    Cardiomyopathy (H) Medium  Unknown    CVA (cerebral vascular accident) (H) Medium  4/21/2017    Coronary artery disease involving native coronary artery of native heart with other form of angina pectoris (H) Medium  6/9/2017    Status post coronary angiogram Medium  6/13/2017    Chronic atrial fibrillation (H) Medium  6/22/2017    Homonymous hemianopsia, right Medium  12/20/2017    Cellulitis of left lower extremity Medium  3/13/2018    Syncope, unspecified syncope type Medium  3/13/2018    Diabetic ulcer of left midfoot associated with type 2 diabetes mellitus, with fat layer exposed (H) Medium  3/13/2018    Type 2 diabetes mellitus with diabetic peripheral angiopathy without gangrene, unspecified long term insulin use status (H) Medium  3/13/2018    Microalbuminuria Medium  3/13/2018    Pressure ulcer of toe of right foot, stage 3 (H) Medium  6/22/2018      Code Status History     Date Active Date Inactive Code Status Order ID Comments User Context    9/6/2018  9:18 PM 9/11/2018 11:25 AM Full Code [id number]  Davion Mir MD Inpatient    3/7/2018  2:56 PM 9/6/2018  9:18 PM Full Code [id number]  Davion Mir MD Outpatient    3/3/2018  7:26 PM 3/7/2018  2:56 PM Full Code [id number]  Anthony Baker DO Inpatient    6/17/2017 11:42 AM 3/3/2018  7:26 PM Full Code [id number]  Venkata Callejas MD Outpatient    6/15/2017  7:06 PM 6/17/2017 11:42 AM Full Code [id number]  Oliverio Noel MD Inpatient    4/26/2017 10:20 AM 6/15/2017  7:06 PM Full Code [id number]  Froilan Benítez MD Outpatient    4/21/2017  4:01 AM 4/26/2017 10:20 AM Full Code [id number]  Mateo Arriaga MD Inpatient    11/15/2013 10:03 AM 4/21/2017  4:01 AM Full Code [id number]  Ana Frankel MD Outpatient    11/13/2013  2:57 PM 11/15/2013 10:03 AM Full Code  [id number]  Colin Cruz RN Inpatient         Medication Review      UNREVIEWED medications. Ask your doctor about these medications        Dose / Directions Comments    amLODIPine 5 MG tablet   Commonly known as:  NORVASC   Used for:  Coronary artery disease involving native coronary artery of native heart with angina pectoris (H)        Dose:  5 mg   Take 1 tablet (5 mg) by mouth daily   Quantity:  90 tablet   Refills:  0        clopidogrel 75 MG tablet   Commonly known as:  PLAVIX   Used for:  Coronary artery disease involving native coronary artery of native heart with other form of angina pectoris (H)        Dose:  75 mg   Take 1 tablet (75 mg) by mouth daily   Quantity:  90 tablet   Refills:  2        D3 ADULT PO        Dose:  2000 Units   Take 2,000 Units by mouth every evening   Refills:  0        ELIQUIS 5 MG tablet   Generic drug:  apixaban ANTICOAGULANT        Take by mouth 2 times daily   Refills:  0        insulin glargine U-300 300 UNIT/ML injection   Commonly known as:  TOUJEO        Dose:  35 Units   Inject 35 Units Subcutaneous 2 times daily   Refills:  0        isosorbide mononitrate 30 MG 24 hr tablet   Commonly known as:  IMDUR   Used for:  Hypertension goal BP (blood pressure) < 140/90        Dose:  30 mg   Take 1 tablet (30 mg) by mouth daily   Quantity:  90 tablet   Refills:  1        levothyroxine 137 MCG tablet   Commonly known as:  SYNTHROID/LEVOTHROID        Dose:  137 mcg   Take 137 mcg by mouth At Bedtime   Quantity:  90 tablet   Refills:  3        lisinopril 20 MG tablet   Commonly known as:  PRINIVIL/ZESTRIL   Used for:  Benign essential hypertension        Dose:  20 mg   Take 1 tablet (20 mg) by mouth 2 times daily   Quantity:  180 tablet   Refills:  3        metoprolol succinate 100 MG 24 hr tablet   Commonly known as:  TOPROL-XL   Used for:  Coronary artery disease involving native coronary artery of native heart without angina pectoris        Dose:  100 mg   Take 1 tablet (100  mg) by mouth At Bedtime   Quantity:  90 tablet   Refills:  3        nitroGLYcerin 0.4 MG sublingual tablet   Commonly known as:  NITROSTAT   Used for:  Other chest pain        Dose:  0.4 mg   Place 1 tablet (0.4 mg) under the tongue every 5 minutes as needed for chest pain   Quantity:  50 tablet   Refills:  0        pantoprazole 40 MG EC tablet   Commonly known as:  PROTONIX   Used for:  GERD (gastroesophageal reflux disease)        Dose:  40 mg   Take 1 tablet (40 mg) by mouth every morning (before breakfast)   Quantity:  30 tablet   Refills:  0        silver sulfADIAZINE 1 % cream   Commonly known as:  SILVADENE   Used for:  Pressure ulcer of toe of right foot, stage 3 (H)        Apply topically daily   Quantity:  50 g   Refills:  3        simvastatin 40 MG tablet   Commonly known as:  ZOCOR   Used for:  Coronary artery disease involving native coronary artery of native heart without angina pectoris        Dose:  40 mg   Take 1 tablet (40 mg) by mouth At Bedtime   Quantity:  90 tablet   Refills:  3          START taking        Dose / Directions Comments    cefTRIAXone 1 GM vial   Commonly known as:  ROCEPHIN   Used for:  Bacteremia        Dose:  2000 mg   Inject 2 g (2,000 mg) into the vein daily for 7 days CBC with differential, creatinine, SGOT weekly while on this medication to be faxed to Dr. Zacarias office.   Quantity:  600 mL   Refills:  0        order for DME   Used for:  Diabetic ulcer of left midfoot associated with diabetes mellitus of other type, unspecified ulcer stage (H)        Equipment being ordered: Walker Wheels () and Walker () Treatment Diagnosis: Impaired gait, heel WB bilaterally.   Quantity:  1 each   Refills:  0          CONTINUE these medications which have NOT CHANGED        Dose / Directions Comments    insulin pen needle 31G X 6 MM   Used for:  Type 2 diabetes mellitus with diabetic peripheral angiopathy without gangrene, unspecified long term insulin use status (H)        Use   as directed.   Quantity:  100 each   Refills:  prn

## 2018-09-06 NOTE — IP AVS SNAPSHOT
MRN:[id number]                      After Visit Summary   9/6/2018    Jayro Elder    MRN: [id number]           Thank you!     Thank you for choosing [facility] for your care. Our goal is always to provide you with excellent care. Hearing back from our patients is one way we can continue to improve our services. Please take a few minutes to complete the written survey that you may receive in the mail after you visit. If you would like to speak to someone directly about your visit please contact Patient Relations at [phone]. Thank you!          Patient Information     Date Of Birth          1950        Designated Caregiver       Most Recent Value    Caregiver    Name of designated caregiver self    Phone number of caregiver na      About your hospital stay     You were admitted on:  September 6, 2018 You last received care in the:  [place] [address] [facility]    You were discharged on:  September 17, 2018        Reason for your hospital stay       Foot infection                  Who to Call     For medical emergencies, please call 911.  For non-urgent questions about your medical care, please call your primary care provider or clinic, [phone]  For questions related to your surgery, please call your surgery clinic        Attending Provider     Provider Specialty    Manoj Shannon MD Emergency Medicine    AdeolaDavion gibson MD Internal Medicine       Primary Care Provider Office Phone # Fax #    Davion Cordova PA-C [phone]       When to contact your care team       Call if questions.  Notify provider if fevers, worsening pain, worsening appearance foot/leg, glucoses running under 70 or over 350, other new medical concerns.                  After Care Instructions     Diet       Follow this diet upon discharge: Diabetic heart healthy            Discharge Instructions       You should continue with daily ceftriaxone  antibiotics at the infusion center for 2 more days.  They then can remove your IV line once your course is complete.                  Follow-up Appointments     Follow-up and recommended labs and tests        Follow-up primary provider in 1-2 weeks.  Follow-up orthopedics as they separately recommended.                  Your next 10 appointments already scheduled     Sep 18, 2018  1:45 PM CDT   injection with RH LAB DRAW 1   [facility] ([facility])    [facility]  [address]  [facility] [address]   [phone]            Sep 19, 2018  1:45 PM CDT   injection with RH LAB DRAW 1   [facility] ([facility])    [facility]  [address]  [facility] [address]   [phone]            Sep 20, 2018  1:45 PM CDT   injection with RH LAB DRAW 1   [facility] ([facility])    [facility]  [address]  [facility] [address]   [phone]            Sep 21, 2018  1:45 PM CDT   injection with RH LAB DRAW 1   [facility] ([facility])    [facility]  [address]  [facility] [address]   [phone]              Further instructions from your care team       Plan of care for wound located on Left 1st MTP and 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 Could also use soap and water after discharge. Apply Vaseline gauze, dry 4x4 gauze, Kerlix wrap. Secure with Ace bandage. Change once daily.    [facility] Wednesday 9/19/18 0830. Future follow up appointments will be made at this appointment.     Patient may heel weightbear as tolerated in post op shoes, should avoid pressure on midfoot/forefoot.    Pending Results     No orders found from 9/4/2018 to  "2018.            Statement of Approval     Ordered          18 [id number]  I have reviewed and agree with all the recommendations and orders detailed in this document.  EFFECTIVE NOW     Approved and electronically signed by:  Anthony Baker DO             Admission Information     Date & Time Provider Department Dept. Phone    2018 Davion Mir MD [place] [address] [facility] [phone]      Your Vitals Were     Blood Pressure Pulse Temperature Respirations Height Weight    167/97 (BP Location: Right arm) 77 96.4  F (35.8  C) (Oral) 16 1.93 m (6' 4\") 115.1 kg (253 lb 11.2 oz)    Pulse Oximetry BMI (Body Mass Index)                96% 30.88 kg/m2          [employer] Information     [employer] lets you send messages to your doctor, view your test results, renew your prescriptions, schedule appointments and more. To sign up, go to [email or web address] . Click on \"Log in\" on the left side of the screen, which will take you to the Welcome page. Then click on \"Sign up Now\" on the right side of the page.     You will be asked to enter the access code listed below, as well as some personal information. Please follow the directions to create your username and password.     Your access code is: [email or web address]  Expires: 2018  4:04 PM     Your access code will  in 90 days. If you need help or a new code, please call your [facility] clinic or [phone].        Care EveryWhere ID     This is your Care EveryWhere ID. This could be used by other organizations to access your [facility] medical records  [id number]        Equal Access to Services     [facility] : Alan Miller, raymond gardner, rivera erwin. So [facility] [phone].    ATENCIÓN: Si habla español, tiene a renteria disposición servicios gratuitos de asistencia lingüística. Llame al [phone].    We comply with applicable federal civil rights laws and [place] laws. " We do not discriminate on the basis of race, color, national origin, age, disability, sex, sexual orientation, or gender identity.               Review of your medicines      START taking        Dose / Directions    cefTRIAXone 1 GM vial   Commonly known as:  ROCEPHIN   Used for:  Bacteremia        Dose:  2000 mg   Inject 2 g (2,000 mg) into the vein daily for 7 days CBC with differential, creatinine, SGOT weekly while on this medication to be faxed to Dr. Zacarias office.   Quantity:  600 mL   Refills:  0       order for DME   Used for:  Diabetic ulcer of left midfoot associated with diabetes mellitus of other type, unspecified ulcer stage (H)        Equipment being ordered: Walker Wheels () and Walker () Treatment Diagnosis: Impaired gait, heel WB bilaterally.   Quantity:  1 each   Refills:  0         CONTINUE these medicines which may have CHANGED, or have new prescriptions. If we are uncertain of the size of tablets/capsules you have at home, strength may be listed as something that might have changed.        Dose / Directions    metoprolol succinate 100 MG 24 hr tablet   Commonly known as:  TOPROL-XL   This may have changed:  how much to take   Used for:  Coronary artery disease involving native coronary artery of native heart without angina pectoris        Dose:  50 mg   Take 0.5 tablets (50 mg) by mouth At Bedtime   Quantity:  90 tablet   Refills:  3         CONTINUE these medicines which have NOT CHANGED        Dose / Directions    amLODIPine 5 MG tablet   Commonly known as:  NORVASC   Used for:  Coronary artery disease involving native coronary artery of native heart with angina pectoris (H)        Dose:  5 mg   Take 1 tablet (5 mg) by mouth daily   Quantity:  90 tablet   Refills:  0       clopidogrel 75 MG tablet   Commonly known as:  PLAVIX   Used for:  Coronary artery disease involving native coronary artery of native heart with other form of angina pectoris (H)        Dose:  75 mg   Take 1  tablet (75 mg) by mouth daily   Quantity:  90 tablet   Refills:  2       D3 ADULT PO        Dose:  2000 Units   Take 2,000 Units by mouth every evening   Refills:  0       ELIQUIS 5 MG tablet   Generic drug:  apixaban ANTICOAGULANT        Take by mouth 2 times daily   Refills:  0       insulin glargine U-300 300 UNIT/ML injection   Commonly known as:  TOUJEO        Dose:  35 Units   Inject 35 Units Subcutaneous 2 times daily   Refills:  0       insulin pen needle 31G X 6 MM   Used for:  Type 2 diabetes mellitus with diabetic peripheral angiopathy without gangrene, unspecified long term insulin use status (H)        Use  as directed.   Quantity:  100 each   Refills:  prn       isosorbide mononitrate 30 MG 24 hr tablet   Commonly known as:  IMDUR   Used for:  Hypertension goal BP (blood pressure) < 140/90        Dose:  30 mg   Take 1 tablet (30 mg) by mouth daily   Quantity:  90 tablet   Refills:  1       levothyroxine 137 MCG tablet   Commonly known as:  SYNTHROID/LEVOTHROID        Dose:  137 mcg   Take 137 mcg by mouth At Bedtime   Quantity:  90 tablet   Refills:  3       lisinopril 20 MG tablet   Commonly known as:  PRINIVIL/ZESTRIL   Used for:  Benign essential hypertension        Dose:  20 mg   Take 1 tablet (20 mg) by mouth 2 times daily   Quantity:  180 tablet   Refills:  3       nitroGLYcerin 0.4 MG sublingual tablet   Commonly known as:  NITROSTAT   Used for:  Other chest pain        Dose:  0.4 mg   Place 1 tablet (0.4 mg) under the tongue every 5 minutes as needed for chest pain   Quantity:  50 tablet   Refills:  0       pantoprazole 40 MG EC tablet   Commonly known as:  PROTONIX   Used for:  GERD (gastroesophageal reflux disease)        Dose:  40 mg   Take 1 tablet (40 mg) by mouth every morning (before breakfast)   Quantity:  30 tablet   Refills:  0       silver sulfADIAZINE 1 % cream   Commonly known as:  SILVADENE   Used for:  Pressure ulcer of toe of right foot, stage 3 (H)        Apply topically daily    Quantity:  50 g   Refills:  3       simvastatin 40 MG tablet   Commonly known as:  ZOCOR   Used for:  Coronary artery disease involving native coronary artery of native heart without angina pectoris        Dose:  40 mg   Take 1 tablet (40 mg) by mouth At Bedtime   Quantity:  90 tablet   Refills:  3            Where to get your medicines      These medications were sent to [place], [place] - [address] [facility]  [address] [facility] [address]     Phone:  [phone]     metoprolol succinate 100 MG 24 hr tablet         Some of these will need a paper prescription and others can be bought over the counter. Ask your nurse if you have questions.     Bring a paper prescription for each of these medications     order for DME       You don't need a prescription for these medications     cefTRIAXone 1 GM vial                Protect others around you: Learn how to safely use, store and throw away your medicines at www.disposemymeds.org.        ANTIBIOTIC INSTRUCTION     You've Been Prescribed an Antibiotic - Now What?  Your healthcare team thinks that you or your loved one might have an infection. Some infections can be treated with antibiotics, which are powerful, life-saving drugs. Like all medications, antibiotics have side effects and should only be used when necessary. There are some important things you should know about your antibiotic treatment.      Your healthcare team may run tests before you start taking an antibiotic.    Your team may take samples (e.g., from your blood, urine or other areas) to run tests to look for bacteria. These test can be important to determine if you need an antibiotic at all and, if you do, which antibiotic will work best.      Within a few days, your healthcare team might change or even stop your antibiotic.    Your team may start you on an antibiotic while they are working to find out what is making you sick.    Your team might change your  antibiotic because test results show that a different antibiotic would be better to treat your infection.    In some cases, once your team has more information, they learn that you do not need an antibiotic at all. They may find out that you don't have an infection, or that the antibiotic you're taking won't work against your infection. For example, an infection caused by a virus can't be treated with antibiotics. Staying on an antibiotic when you don't need it is more likely to be harmful than helpful.      You may experience side effects from your antibiotic.    Like all medications, antibiotics have side effects. Some of these can be serious.    Let you healthcare team know if you have any known allergies when you are admitted to the hospital.    One significant side effect of nearly all antibiotics is the risk of severe and sometimes deadly diarrhea caused by Clostridium difficile (C. Difficile). This occurs when a person takes antibiotics because some good germs are destroyed. Antibiotic use allows C. diificile to take over, putting patients at high risk for this serious infection.    As a patient or caregiver, it is important to understand your or your loved one's antibiotic treatment. It is especially important for caregivers to speak up when patients can't speak for themselves. Here are some important questions to ask your healthcare team.    What infection is this antibiotic treating and how do you know I have that infection?    What side effects might occur from this antibiotic?    How long will I need to take this antibiotic?    Is it safe to take this antibiotic with other medications or supplements (e.g., vitamins) that I am taking?     Are there any special directions I need to know about taking this antibiotic? For example, should I take it with food?    How will I be monitored to know whether my infection is responding to the antibiotic?    What tests may help to make sure the right antibiotic is  prescribed for me?      Information provided by:  www.cdc.gov/getsmart  U.S. Department of Health and Human Services  Centers for disease Control and Prevention  National Center for Emerging and Zoonotic Infectious Diseases  Division of Healthcare Quality Promotion             Medication List: This is a list of all your medications and when to take them. Check marks below indicate your daily home schedule. Keep this list as a reference.      Medications           Morning Afternoon Evening Bedtime As Needed    amLODIPine 5 MG tablet   Commonly known as:  NORVASC   Take 1 tablet (5 mg) by mouth daily   Last time this was given:  5 mg on 9/17/2018  8:21 AM   Last time this was given:  9/17/2018  8:21 AM   Next Dose Due:  9/18/2018 in AM                                cefTRIAXone 1 GM vial   Commonly known as:  ROCEPHIN   Inject 2 g (2,000 mg) into the vein daily for 7 days CBC with differential, creatinine, SGOT weekly while on this medication to be faxed to Dr. Zacarias office.   Last time this was given:  2 g on 9/17/2018  2:54 PM   Last time this was given:  9/17/2018  2:54 PM                                clopidogrel 75 MG tablet   Commonly known as:  PLAVIX   Take 1 tablet (75 mg) by mouth daily   Last time this was given:  75 mg on 9/17/2018  1:51 PM   Last time this was given:  9/17/2018  1:51 PM   Next Dose Due:  9/18/2018  In AM                                D3 ADULT PO   Take 2,000 Units by mouth every evening   Last time this was given:  Not given during hospitalization   Next Dose Due:  Resume home regimen                                ELIQUIS 5 MG tablet   Take by mouth 2 times daily   Last time this was given:  5 mg on 9/17/2018  8:21 AM   Generic drug:  apixaban ANTICOAGULANT   Last time this was given:  9/17/2018  8:21 AM   Next Dose Due:  9/18/2018  in AM                                insulin glargine U-300 300 UNIT/ML injection   Commonly known as:  TOUJEO   Inject 35 Units Subcutaneous 2 times  daily   Last time this was given:  35 Units on 9/17/2018 12:43 PM   Last time this was given:  9/17/2018 12:43 PM   Next Dose Due:  9/18/2018 in am                                insulin pen needle 31G X 6 MM   Use  as directed.                                isosorbide mononitrate 30 MG 24 hr tablet   Commonly known as:  IMDUR   Take 1 tablet (30 mg) by mouth daily   Last time this was given:  30 mg on 9/17/2018  8:20 AM   Last time this was given:  9/17/2018  8:20 AM   Next Dose Due:  9/18/2018  in AM                                levothyroxine 137 MCG tablet   Commonly known as:  SYNTHROID/LEVOTHROID   Take 137 mcg by mouth At Bedtime   Last time this was given:  137 mcg on 9/16/2018  9:05 PM   Last time this was given:  9/16/2018  9:05 PM   Next Dose Due:  9/17/2018  At bedtime                                lisinopril 20 MG tablet   Commonly known as:  PRINIVIL/ZESTRIL   Take 1 tablet (20 mg) by mouth 2 times daily   Last time this was given:  20 mg on 9/17/2018  8:21 AM   Last time this was given:  9/17/2018  8:21 AM   Next Dose Due:  9/18/2018  2100                                metoprolol succinate 100 MG 24 hr tablet   Commonly known as:  TOPROL-XL   Take 0.5 tablets (50 mg) by mouth At Bedtime   Last time this was given:  50 mg on 9/16/2018  9:06 PM   Last time this was given:  9/16/2018  9:06 PM   Next Dose Due:  9/17/18 at bedtime                                nitroGLYcerin 0.4 MG sublingual tablet   Commonly known as:  NITROSTAT   Place 1 tablet (0.4 mg) under the tongue every 5 minutes as needed for chest pain   Last time this was given:  0.4 mg on 9/14/2018 12:04 PM   Next Dose Due:  As needed for chest pain                                order for DME   Equipment being ordered: Walker Wheels () and Walker () Treatment Diagnosis: Impaired gait, heel WB bilaterally.                                pantoprazole 40 MG EC tablet   Commonly known as:  PROTONIX   Take 1 tablet (40 mg) by mouth  every morning (before breakfast)   Last time this was given:  40 mg on 9/17/2018  8:21 AM   Last time this was given:  9/17/2018  8:21 AM   Next Dose Due:  9/18/2018 in AM                                silver sulfADIAZINE 1 % cream   Commonly known as:  SILVADENE   Apply topically daily                                simvastatin 40 MG tablet   Commonly known as:  ZOCOR   Take 1 tablet (40 mg) by mouth At Bedtime   Last time this was given:  40 mg on 9/16/2018  9:06 PM   Last time this was given:  9/16/2018  9:06 PM   Next Dose Due:  9/16/2018 at bedtime                                          More Information        Wound Care  Taking proper care of your wound will help it heal. Your healthcare provider may show you how to clean and dress the wound. He or she will also explain how to tell if the wound is healing normally. If you are unsure of how to take care of the wound, be sure to clarify what dressing to use and how often you should change the bandages. Here are the basic steps.     A wound that's not healing normally may be dark in color or have white streaks.   Wash your hands  Tips for washing your hands include:    Use liquid soap and lather for 2 minutes. Scrub between your fingers and under your nails.    Rinse with warm water, keeping your fingers pointing down.    Use a paper towel to dry your hands and to turn off the faucet.  Remove the used dressing  Here are suggestions for removing the dressing:    If dressing changes cause you pain, be sure to take your pain medicine as prescribed by your healthcare provider 30 minutes before dressing changes.    Set up your supplies.    Put on disposable gloves if you re dressing a wound for someone else or your wound is infected.    Loosen the tape by pulling gently toward the wound.    Gently take off the old dressing. If the dressing is stuck to the wound, moisten it with saline (if available) or clean water.    If you have a drain or tube in the wound, be  careful not to pull on it.    Remove the dressing 1 layer at a time and put it in a plastic bag. Seal the bag and put it in the trash.    Remove your gloves.  Inspect and dress the wound  Check the wound carefully:    Each time you change the dressing, check the wound carefully to be sure it s healing normally by making sure your wound appears to be pink and moist, and is free of infection.      Wash your hands again. Put on a new pair of gloves.    Clean and dress the wound as directed by your healthcare provider or nurse. Don't put anything in the wound that is not prescribed or directed by your healthcare provider. If you have a drain or tube, be careful not to pull on it. Make sure to secure the drain or tube as well.    Put all unused supplies in a clean plastic bag. Seal the bag and store it in a clean, dry area between dressing changes.     Be sure to wash your hands again.  Call your healthcare provider  Call your healthcare provider if you see any of the following signs of a problem:    Bleeding that soaks the dressing    Pink fluid weeping from the wound    Increased drainage or drainage that is yellow, yellow-green, or foul-smelling    Increased swelling or pain, or redness or swelling in the skin around the wound    A change in the color of the wound, or if streaks develop in a direction away from the wound    The area between any stitches opens up    An increase in the size of the wound    A fever of 100.4 F (38 C) or higher, or as directed by your healthcare provider    Chills, increased fatigue, or a loss of appetite      Date Last Reviewed: 7/1/2017 2000-2017 [employer]. [address] [place], [place] [address]. All rights reserved. This information is not intended as a substitute for professional medical care. Always follow your healthcare professional's instructions.                Recognizing and Treating Wound Infection  Wounds can become infected with harmful germs (bacteria).  This prevents healing. It also increases your risk of scars. In some cases, the infection may spread to other parts of your body. An infection with the bacteria that causes tetanus can be fatal. Know what to look for and get prompt treatment for infection.    What are the risk factors for infection?  A wound is more likely to become infected if it:    Results from a hole (puncture), such as from a nail or piece of glass    Results from a human or animal bite    Isn't cleaned or treated within 8 hours    Occurs in your hand, foot, leg, armpit, or groin (the area where your belly meets your thighs)    Contains dirt or saliva    Heals very slowly    Occurs in a person with diabetes, alcoholism, or a weak immune system    What are the symptoms of infection?  Call your healthcare provider at the first sign of infection, such as:    Yellow, yellow-green, or foul-smelling drainage from a wound    More pain, swelling, or redness in or near a wound    A change in the color or size of a wound    Red streaks in the skin around the wound    Fever  How are infections treated?   Treatment depends on the type of infection you have, and how serious it is. Your healthcare provider may prescribe oral antibiotics to help fight bacteria. Your provider may also flush the wound with an antibiotic solution or apply an antibiotic ointment. Sometimes a pocket of pus (abscess) may form. In that case, the abscess will be opened and the fluid drained. You may need hospital care if the infection is very severe.  Preventing wound infection  Follow these steps to help keep wounds from getting infected:    Wash the wound right away with soap and water.    Apply a small amount of antibiotic ointment. You can buy this without a prescription.    Cover wounds with a bandage or gauze dressing. Change daily.    Keep the wound clean and dry for the first 24 hours.    Change the dressing daily using sterile gloves.   Date Last Reviewed: 9/1/2017     [id number] [employer]. [address] [place] [address], [place] [address]. All rights reserved. This information is not intended as a substitute for professional medical care. Always follow your healthcare professional's instructions.

## 2018-09-06 NOTE — LETTER
"          Key Recommendations:  Pt is admitted with cellulitis and positive blood cultures.  He was scheduled for an amputation of his toe, but they moved it up to this hospitalization.  His surgery was ***.  His a1c is 9.4.  He is compliant with his glargine.  However, it is expensive, but his endocrinologist gives it to him for free.  He has requested financial support from the drug company, but hasn't heard back yet.  Our Pharmacy liaison saw pt and ***.  He rarely checks his blood glucose since it's hard to get a sample with his calloused fingers.  He stopped taking metformin d/t the side effects of bloating.  He follows with Dr Scott endocrinology and would like to stay with him.   Pt does have WB restrictions.  However, he did declines Home care support: Per PT Therapy note: .  Pt refuses all mobility training and recommendations, states \"It doesn't hurt, I'll be fine\". Refused any further mobility training or practice navigating stairs.  Pt requesting to discontinue all therapy services, refused FWW for home.  Will complete PT orders.  Cardiology saw pt and recommends Xochitl stress test ***.  ID recommends IV ABX ***.  Elevated concern about compliance with dressing changes on foot, dm management and care of self for health management.     Rosy Morrow    AVS/Discharge Summary is the source of truth; this is a helpful guide for improved communication of patient story          "

## 2018-09-06 NOTE — ED PROVIDER NOTES
History     Chief Complaint:  Altered Mental Status    HPI   Jayro Elder is a 68 year old male with a history of atrial fibrillation and previous foot infections, who presents with his son to the ED for the evaluation of altered mental status. Per EMS, the patient was very fatigued, nauseous, and confused upon arrival. They note that the patient's blood sugar was at 270 and that his responsiveness has increased significantly upon arrival to the ED. The patient reports that he started to feel irregularly hot and that he started to dry heave yesterday, prompting him to the ED. The patient notes that he has been experiencing rhinorrhea for the past week and that the he has also been experiencing a stinging pain in the bottom of his left foot, but that this pain is usual. The patient reports that he has an ulcer on his right medial MTP and that a plantar wound on his left third toe.  The patient also notes that his last foot infection was in March and that he last drank alcohol 2-3 days ago. The patient denies cough, diarrhea, recent accidents, hitting his head, or new sores on his body.    Allergies:  No known drug allergies     Medications:    Norvasc  Eliquis  Cholecalciferol  Plavix  Imdur  Synthroid  Prinivil  Toprol  Nitrostat  Protonix  Silvadene  Zocor  Lantus    Past Medical History:    CAD  Cardiomyopathy  Cellulitis of left lower extremity  Diabetic ulcer of left midfoot  Hypertension  Generalized osteoarthrosis  Microalbuminuria  Myocardial infarction  Neuropathy  Sepsis  Syncope  Type 2 diabetes      Past Surgical History:    Angiogram  Heart cath left heart cath    Family History:    Cancer  Thyroid disease  Cardiovascular  Diabetes  Arthritis    Social History:  Smoking status: Former Smoker, Quit Date: 7/25/2005  Alcohol use: Yes  Marital Status:   [2]     Review of Systems   HENT: Negative for rhinorrhea.    Respiratory: Negative for cough.    Gastrointestinal: Positive for nausea.    Musculoskeletal: Positive for arthralgias.   Skin: Positive for wound.   All other systems reviewed and are negative.    Physical Exam   Patient Vitals for the past 24 hrs:   BP Temp Temp src Heart Rate Resp SpO2 Weight   09/06/18 1700 120/86 - - 113 - 91 % -   09/06/18 1645 131/89 - - 93 - 93 % -   09/06/18 1630 138/81 - - 98 23 91 % -   09/06/18 1615 131/83 - - 113 21 92 % -   09/06/18 1603 - - - - - - 113.4 kg (250 lb)   09/06/18 1600 (!) 122/94 - - 118 21 93 % -   09/06/18 1546 - - - 117 (!) 31 92 % -   09/06/18 1545 (!) 137/91 - - 126 30 - -   09/06/18 1530 - - - 108 15 94 % -   09/06/18 1516 134/88 98.1  F (36.7  C) Oral 117 13 93 % -   09/06/18 1515 134/88 - - - 18 92 % -   09/06/18 1514 - - - - - 93 % -   09/06/18 1513 (!) 140/99 - - - - - -       Physical Exam  General: The patient is alert, in no respiratory distress. He answers questions regularly.     HENT: Mucous membranes dry.    Cardiovascular: Regular rate and rhythm. Good pulses in all four extremities. Normal capillary refill and skin turgor.     Abdomen: Nontender    Respiratory: Lungs are clear. No nasal flaring. No retractions. No wheezing, no crackles.    Gastrointestinal: Abdomen soft. No guarding, no rebound. No palpable hernias.     Musculoskeletal: No gross deformity.     Skin: No rashes or petechiae. Back is erythematous and warm in a large confluent patch. Large ulcer on left medial MTP greater than quarter size with a a mass of tissue present. Right third toe there is a plantar wound draining and erythematous to the whole appendage.     Neurologic: The patient is alert and oriented x3. GCS 15. No testable cranial nerve deficit. Follows commands with clear and appropriate speech. Gives appropriate answers. Good strength in all extremities. No gross neurologic deficit. Gross sensation intact. Pupils are round and reactive. No meningismus.     Lymphatic: No cervical adenopathy. No lower extremity swelling.    Psychiatric: The patient is  non-tearful.    Emergency Department Course   ECG (15:20:28):  Rate 112 bpm. RI interval *. QRS duration 144. QT/QTc 364/496. P-R-T axes * -43 -4. Atrial fibrillation with rapid ventricula response with premature ventricular or aberrantly conducted complexes. Left axis deviation. Right bundle branch block. Septal infarct, age undetermined. Abnormal ECG. Similar to ECG dated 3/3/2018 Interpreted at 0327 by Manoj Shannon MD.    Imaging:  Radiographic findings were communicated with the patient who voiced understanding of the findings.  Head CT w/o Contrast  IMPRESSION:   No acute intracranial abnormality.  As read by Radiology.    Foot XR, G/E 3 Views  IMPRESSION: There is soft tissue irregularity adjacent to the medial  aspect of the first MTP joint. There are degenerative changes here but  no lytic or destructive changes. No definite x-ray evidence of  osteomyelitis. Degenerative changes in the midfoot.    As read by Radiology.    MR Foot Left w/o and w Contrast  In Process  As read by Radiology.    XR Chest 2 Views  IMPRESSION: Heart size is upper normal. Pulmonary vasculature is not  distended. No focal infiltrates or evidence of pleural fluid. Healed  left rib fractures.    As read by Radiology.    Laboratory:  Blood culture: (In process)  Blood culture: (In process)    TSH with free T4 reflex: 0.40  ISTAT gases lactate derick POCT (1541): PCO2 37 (L)  CBC: WBC 11.4 (H), WNL (HGB 13.9, )  CMP: Glucose 214 (H), Calcium 8.1 (L), Bilirubin 2.3 (H), Protein Total 6.4 (L), WNL (Creatinine 0.85)  Lactic acid whole blood (1558): 1.7  INR: 1.36 (H)  Wound Culture Aerobic Bacterial: (In process)    UA: Urine GLC >499, Urineketon 20, Mucous Present, o/w Negative  Urine Culture Aerobic Bacterial: (In process)    Interventions:  1544, NS 3L IV Bolus  1712, zosyn, 4.5 g, IV  1756, vancocin, 2,750 mg,IV    Emergency Department Course:  Patient arrived via ambulance.    Past medical records, nursing notes, and  vitals reviewed.  1515: I performed an exam of the patient and obtained history, as documented above.    IV inserted and blood drawn.    The patient was sent for a x ray and CT while in the emergency department, findings above.    1908: I rechecked the patient. Explained findings to patient. Patient is in stable condition and his back is less red.    1940: Discussed the patient with Dr. Mir, who will admit the patient to an inpatient bed for further monitoring, evaluation, and treatment. Findings and plan explained to the Patient who consents to admission.     1940: I spoke to Dr. Mir of the hospitalist service who accepts the patient for admission.     Impression & Plan    Medical Decision Making:  The patient had recently been in the hospital in March with lower extremity cellulitis and a chronic left foot ulcer. At that time he'd had similar symptoms, the EMS was called to patient's house today and that he was altered, confused, had a low blood pressure that was not documented, but he did respond to IV fluid. Here, most obvious source could be the diabetic foot ulcer, however he also has an ulcer on a toe on his right, the third digit, is excessively swollen, is draining, and that was cultured. There was some erythema to his back as well. I did consider intracranial process from the CT scan, I ordered an MRI, but it was not resulted prior to leaving the ER. Labs were sent. The patient does not show signs of sepsis, was not hypotensive here, and was much more alert and conversant. At this point, I think the patient was having these issues secondary to his infection, required an inpatient stay, was started on vancocin and zosyn, and was admitted to the hospital in good condition.     Diagnosis:    ICD-10-CM    1. Altered mental status, unspecified altered mental status type R41.82    2. Diabetic ulcer of left midfoot associated with diabetes mellitus of other type, unspecified ulcer stage (H) E13.621      L97.429    3. Hypothyroidism, unspecified type E03.9        Disposition:  Admitted to hospital.    Maverick Grossman  9/6/2018   [facility] EMERGENCY DEPARTMENT  Scribe Disclosure:  I, Maverick Ngoc, am serving as a scribe at 3:15 PM on 9/6/2018 to document services personally performed by Manoj Shannon MD based on my observations and the provider's statements to me.        Manoj Shannon MD  09/06/18 2372

## 2018-09-06 NOTE — IP AVS SNAPSHOT
[facility] [facility]    [address]    [facility] [address]    Phone:  [phone]    Fax:  [phone]                                       After Visit Summary   9/6/2018    Jayro Elder    MRN: [id number]           After Visit Summary Signature Page     I have received my discharge instructions, and my questions have been answered. I have discussed any challenges I see with this plan with the nurse or doctor.    ..........................................................................................................................................  Patient/Patient Representative Signature      ..........................................................................................................................................  Patient Representative Print Name and Relationship to Patient    ..................................................               ................................................  Date                                   Time    ..........................................................................................................................................  Reviewed by Signature/Title    ...................................................              ..............................................  Date                                               Time          22EPIC Rev 08/18

## 2018-09-06 NOTE — ED TRIAGE NOTES
"Patient BIBA for eval of AMS, was \"delerious\" per EMS. Per EMS, patient has h/o sepsis and family states presentation was the same prior. Patient is diabetic and has a foot ulcer.   "

## 2018-09-07 ENCOUNTER — APPOINTMENT (OUTPATIENT)
Dept: MRI IMAGING | Facility: CLINIC | Age: 68
DRG: 617 | End: 2018-09-07
Attending: HOSPITALIST
Payer: MEDICARE

## 2018-09-07 LAB
ALBUMIN SERPL-MCNC: 3 G/DL (ref 3.4–5)
ALP SERPL-CCNC: 64 U/L (ref 40–150)
ALT SERPL W P-5'-P-CCNC: 27 U/L (ref 0–70)
ANION GAP SERPL CALCULATED.3IONS-SCNC: 9 MMOL/L (ref 3–14)
AST SERPL W P-5'-P-CCNC: 14 U/L (ref 0–45)
BACTERIA SPEC CULT: NO GROWTH
BILIRUB SERPL-MCNC: 1.9 MG/DL (ref 0.2–1.3)
BUN SERPL-MCNC: 16 MG/DL (ref 7–30)
CALCIUM SERPL-MCNC: 7.8 MG/DL (ref 8.5–10.1)
CHLORIDE SERPL-SCNC: 105 MMOL/L (ref 94–109)
CO2 SERPL-SCNC: 23 MMOL/L (ref 20–32)
CREAT SERPL-MCNC: 1.09 MG/DL (ref 0.66–1.25)
CRP SERPL-MCNC: 103 MG/L (ref 0–8)
ERYTHROCYTE [DISTWIDTH] IN BLOOD BY AUTOMATED COUNT: 12.8 % (ref 10–15)
GFR SERPL CREATININE-BSD FRML MDRD: 67 ML/MIN/1.7M2
GLUCOSE BLDC GLUCOMTR-MCNC: 228 MG/DL (ref 70–99)
GLUCOSE BLDC GLUCOMTR-MCNC: 253 MG/DL (ref 70–99)
GLUCOSE BLDC GLUCOMTR-MCNC: 266 MG/DL (ref 70–99)
GLUCOSE BLDC GLUCOMTR-MCNC: 274 MG/DL (ref 70–99)
GLUCOSE BLDC GLUCOMTR-MCNC: 289 MG/DL (ref 70–99)
GLUCOSE SERPL-MCNC: 248 MG/DL (ref 70–99)
HCT VFR BLD AUTO: 35.3 % (ref 40–53)
HGB BLD-MCNC: 12 G/DL (ref 13.3–17.7)
INTERPRETATION ECG - MUSE: NORMAL
Lab: NORMAL
MCH RBC QN AUTO: 29.9 PG (ref 26.5–33)
MCHC RBC AUTO-ENTMCNC: 34 G/DL (ref 31.5–36.5)
MCV RBC AUTO: 88 FL (ref 78–100)
PLATELET # BLD AUTO: 152 10E9/L (ref 150–450)
POTASSIUM SERPL-SCNC: 3.8 MMOL/L (ref 3.4–5.3)
PROT SERPL-MCNC: 5.8 G/DL (ref 6.8–8.8)
RBC # BLD AUTO: 4.02 10E12/L (ref 4.4–5.9)
SODIUM SERPL-SCNC: 137 MMOL/L (ref 133–144)
SPECIMEN SOURCE: NORMAL
WBC # BLD AUTO: 8.3 10E9/L (ref 4–11)

## 2018-09-07 PROCEDURE — 93005 ELECTROCARDIOGRAM TRACING: CPT

## 2018-09-07 PROCEDURE — 25000128 H RX IP 250 OP 636: Performed by: INTERNAL MEDICINE

## 2018-09-07 PROCEDURE — 12000000 ZZH R&B MED SURG/OB

## 2018-09-07 PROCEDURE — 99233 SBSQ HOSP IP/OBS HIGH 50: CPT | Performed by: HOSPITALIST

## 2018-09-07 PROCEDURE — 25000131 ZZH RX MED GY IP 250 OP 636 PS 637: Mod: GY | Performed by: INTERNAL MEDICINE

## 2018-09-07 PROCEDURE — 25000132 ZZH RX MED GY IP 250 OP 250 PS 637: Mod: GY | Performed by: HOSPITALIST

## 2018-09-07 PROCEDURE — A9585 GADOBUTROL INJECTION: HCPCS | Performed by: INTERNAL MEDICINE

## 2018-09-07 PROCEDURE — A9270 NON-COVERED ITEM OR SERVICE: HCPCS | Mod: GY | Performed by: HOSPITALIST

## 2018-09-07 PROCEDURE — 73720 MRI LWR EXTREMITY W/O&W/DYE: CPT | Mod: RT

## 2018-09-07 PROCEDURE — 85027 COMPLETE CBC AUTOMATED: CPT | Performed by: INTERNAL MEDICINE

## 2018-09-07 PROCEDURE — 80053 COMPREHEN METABOLIC PANEL: CPT | Performed by: INTERNAL MEDICINE

## 2018-09-07 PROCEDURE — 36415 COLL VENOUS BLD VENIPUNCTURE: CPT | Performed by: INTERNAL MEDICINE

## 2018-09-07 PROCEDURE — 86140 C-REACTIVE PROTEIN: CPT | Performed by: INTERNAL MEDICINE

## 2018-09-07 PROCEDURE — 00000146 ZZHCL STATISTIC GLUCOSE BY METER IP

## 2018-09-07 PROCEDURE — 40000275 ZZH STATISTIC RCP TIME EA 10 MIN

## 2018-09-07 PROCEDURE — 25000132 ZZH RX MED GY IP 250 OP 250 PS 637: Mod: GY | Performed by: INTERNAL MEDICINE

## 2018-09-07 PROCEDURE — A9270 NON-COVERED ITEM OR SERVICE: HCPCS | Mod: GY | Performed by: INTERNAL MEDICINE

## 2018-09-07 RX ORDER — GADOBUTROL 604.72 MG/ML
15 INJECTION INTRAVENOUS ONCE
Status: COMPLETED | OUTPATIENT
Start: 2018-09-07 | End: 2018-09-07

## 2018-09-07 RX ADMIN — INSULIN GLARGINE 35 UNITS: 300 INJECTION, SOLUTION SUBCUTANEOUS at 08:05

## 2018-09-07 RX ADMIN — LEVOTHYROXINE SODIUM 137 MCG: 25 TABLET ORAL at 22:12

## 2018-09-07 RX ADMIN — METOPROLOL SUCCINATE 100 MG: 100 TABLET, EXTENDED RELEASE ORAL at 22:12

## 2018-09-07 RX ADMIN — GADOBUTROL 11 ML: 604.72 INJECTION INTRAVENOUS at 11:35

## 2018-09-07 RX ADMIN — INSULIN ASPART 3 UNITS: 100 INJECTION, SOLUTION INTRAVENOUS; SUBCUTANEOUS at 21:05

## 2018-09-07 RX ADMIN — TAZOBACTAM SODIUM AND PIPERACILLIN SODIUM 3.38 G: 375; 3 INJECTION, SOLUTION INTRAVENOUS at 17:19

## 2018-09-07 RX ADMIN — ISOSORBIDE MONONITRATE 30 MG: 30 TABLET, EXTENDED RELEASE ORAL at 08:04

## 2018-09-07 RX ADMIN — ACETAMINOPHEN 650 MG: 325 TABLET, FILM COATED ORAL at 02:07

## 2018-09-07 RX ADMIN — TAZOBACTAM SODIUM AND PIPERACILLIN SODIUM 3.38 G: 375; 3 INJECTION, SOLUTION INTRAVENOUS at 04:52

## 2018-09-07 RX ADMIN — AMLODIPINE BESYLATE 5 MG: 5 TABLET ORAL at 08:04

## 2018-09-07 RX ADMIN — PANTOPRAZOLE SODIUM 40 MG: 40 TABLET, DELAYED RELEASE ORAL at 06:07

## 2018-09-07 RX ADMIN — TAZOBACTAM SODIUM AND PIPERACILLIN SODIUM 3.38 G: 375; 3 INJECTION, SOLUTION INTRAVENOUS at 10:30

## 2018-09-07 RX ADMIN — INSULIN GLARGINE 35 UNITS: 300 INJECTION, SOLUTION SUBCUTANEOUS at 21:07

## 2018-09-07 RX ADMIN — APIXABAN 5 MG: 5 TABLET, FILM COATED ORAL at 21:11

## 2018-09-07 RX ADMIN — INSULIN ASPART 4 UNITS: 100 INJECTION, SOLUTION INTRAVENOUS; SUBCUTANEOUS at 02:10

## 2018-09-07 RX ADMIN — TAZOBACTAM SODIUM AND PIPERACILLIN SODIUM 3.38 G: 375; 3 INJECTION, SOLUTION INTRAVENOUS at 23:13

## 2018-09-07 RX ADMIN — LISINOPRIL 20 MG: 20 TABLET ORAL at 08:04

## 2018-09-07 RX ADMIN — ACETAMINOPHEN 650 MG: 325 TABLET, FILM COATED ORAL at 21:11

## 2018-09-07 RX ADMIN — LISINOPRIL 20 MG: 20 TABLET ORAL at 21:11

## 2018-09-07 RX ADMIN — ACETAMINOPHEN 650 MG: 325 TABLET, FILM COATED ORAL at 15:38

## 2018-09-07 RX ADMIN — SIMVASTATIN 40 MG: 40 TABLET, FILM COATED ORAL at 22:12

## 2018-09-07 ASSESSMENT — ACTIVITIES OF DAILY LIVING (ADL)
ADLS_ACUITY_SCORE: 7

## 2018-09-07 NOTE — PLAN OF CARE
Problem: Patient Care Overview  Goal: Plan of Care/Patient Progress Review  Outcome: No Change  Pt hospitalized for foot wounds and LLE cellulitis.   Vital signs stable, except tachy, on RA, denies any pain.   C/O body aches and chills, temp 98.0, gave Tylenol.   PIV NS 75/hr, continues IV Zosyn.   Up with SBA, voiding without difficulty, alarms on for safety.   , 4 units given.   Will continue to monitor.

## 2018-09-07 NOTE — PROVIDER NOTIFICATION
MD Text Paged: Blanka from [facility] is wondering if we are going to do a R foot MRI like it states in Dr Mir's H&P, she thinks it should be done.

## 2018-09-07 NOTE — PLAN OF CARE
Problem: Patient Care Overview  Goal: Plan of Care/Patient Progress Review  Outcome: No Change  Ambulatory Status:  Pt up SBA.  VS:  VSS  Pain:  Denies  Resp: LS CTA  GI:  Denies nausea. Good appetite and on Mod Cho diet.  BS +.  Passing flatus.  Last BM This AM.  :  Voiding without difficulty  Skin:   Wounds noted to right third toe and left inner plantar ball of foot - Please see MRI results  Tx:  IV Zosyn  Labs:  + BC R Hand growing Group B Strep  Consults:  [facility]  Disposition:  TBD - Surgery Tuesday, may be on Monday depending on Ortho's input on MRI of R foot    Will continue to monitor and provide supportive cares.

## 2018-09-07 NOTE — CONSULTS
Consult Date:  09/07/2018      REFERRING PROVIDER:  Davion Mir MD      INFECTIOUS IMPRESSION:   1.  A 68-year-old diabetic male with acute nausea, low-grade fever and probable right third toe and foot infection.  Cultures are pending, clinically improved on broad antibiotics.   2.  Chronic left first metatarsal ulcer, apparent deep infection with plans for probable amputation as an outpatient, not clear major deep infection or cellulitis by exam currently, but a slight redness present.   3.  Chronic right foot ulcer, now worsening.   4.  Diabetes mellitus with neuropathy.   5.  Prior cerebrovascular infarction.   6.  Coronary artery disease.   7.  History of atrial fibrillation.      RECOMMENDATIONS:   1.  Agree with vancomycin and Zosyn, await cultures and adjust.   2.  Await orthopedic surgical plan, question further vascular valve versus proceeding with amputation now.      HISTORY OF PRESENT ILLNESS:  This 68-year-old male is seen in consultation due to apparent new episode of sepsis, probably related to right foot.  The patient is known to me with an episode very similar to the current episode in March at which time he had vague symptoms that became apparent were a left leg cellulitis.  At that time, he had a chronic left foot ulcer which was evaluated as well.  Subsequent to that, apparently further outpatient workup that showed this to be nonhealing with signs of deep infection so an amputation is actually on the schedule several weeks from now.  He has not had ongoing antibiotics or infection noted.  He also has a chronic third toe ulcer on the right.  It has been there for many months and nonhealing.  It is in that area where some increased pain and redness has occurred, combined now with yesterday, nausea, malaise, fatigue, elevated white count and low-grade fever.  Cultures have been obtained.  He has now been admitted and is on vancomycin and Zosyn.  Awaiting orthopedic evaluation.      PAST  MEDICAL HISTORY:  Diabetic foot ulcers bilaterally as noted, history of cerebrovascular infarction in the past, prior history of episode of left leg cellulitis in March, history of coronary artery disease and atrial fibrillation.      ALLERGIES:  NO ANTIMICROBIAL ALLERGIES.      MEDICATIONS:  As listed.      SOCIAL AND FAMILY HISTORY:  No recent travels or exposures.  No one else he has been around who has been ill.  Prior cigarette smoker but not currently.      REVIEW OF SYSTEMS:  Systemic symptoms all improved as of today, has neuropathy but says he does not have that much pain in his feet.      PHYSICAL EXAMINATION:   GENERAL:  The patient appears his stated age, does not look toxic or ill.   VITAL SIGNS:  Normal currently.   HEENT:  No thrush or intraoral lesions.  Pupils reactive.   NECK:  Supple and nontender without lymphadenopathy or thyromegaly.   HEART AND LUNGS:  Unremarkable.   ABDOMEN:  Soft and nontender.   EXTREMITIES:  Right foot with very slight erythema from the toe into the distal foot.  No major proximal cellulitis, trace of edema.  The toe is about twice normal size, distal abnormality, very likely osteomyelitis.  Left great toe, very slight erythema around it, does not obviously track deep, but apparently outpatient workup showed signs of osteo.  There is no particular tenderness at the site.      LABORATORY DATA:  White count 11,000.  Blood cultures pending.  MRI of the foot shows first metatarsal probable osteo and no abscess on the left.      Thank you much for the consultation. We will follow the patient with you.         ELIZA GARNICA MD             D: 2018   T: 2018   MT: NTS      Name:     PORTER YANCEY   MRN:      [id number]        Account:       [id number]   :      1950           Consult Date:  2018      Document: [id number]       cc: Davion Harp MD

## 2018-09-07 NOTE — PROVIDER NOTIFICATION
Paged Dr Zacarias, was out for the rest of the day, so I left a message for his nurse in regards to the positive blood cultures

## 2018-09-07 NOTE — H&P
Admitted:     09/06/2018      DATE OF ADMISSION: 09/06/2018      CHIEF COMPLAINT:  Tired, weak, lightheadedness and dry heaves.      HISTORY OF PRESENT ILLNESS:  Mr. Elder is a 68-year-old man who reports feeling ill for the past 2-3 days.  He said he has felt a constellation of symptoms including feeling generally weak, tired and lightheaded.  He was having dry heaves throughout the day today.  He denies any abdominal pain.  He stayed upstairs and did not go down because he was concerned about feeling weak and falling down the stairs.  He also has history of a chronic wound on each of his feet.  These have been sore, but he said they were not any more sore than usual.  He said he is not able to see his feet and does not know if they have changed recently.  He saw Dr. Harp at [facility] regarding his left foot ulcer 2 days ago.  Dr. Harp recommended amputation.  The patient said they scheduled a date, but he is not sure what the date is.  He had similar symptoms in March when he was found to have left lower leg cellulitis.      PAST MEDICAL HISTORY:   1.  Diabetes mellitus with neuropathy.   2.  Chronic diabetic foot ulcers.  He has 1 in the first MTP joint on the left foot and 1 on the distal plantar aspect of the right third toe.   3.  Hypertension.   4.  Osteoarthritis.   5.  History of cerebrovascular accident.   6.  Hospitalization in March for left lower leg cellulitis.   7.  Coronary artery disease.  He had coronary artery stents placed in 2005, 2009 and 2017,  primarily involving left anterior descending artery.   8.  Atrial fibrillation.   9.  Syncope.   10.  Spinal surgery.   11.  Bunion surgery.      MEDICATIONS:   1.  Amlodipine 5 mg a day.   2.  Apixaban 5 mg twice a day.   3.  Cholecalciferol 2000 units daily.   4.  Plavix 75 mg a day.   5.  Toujeo U300 insulin 35 units twice a day.   6.  Isosorbide mononitrate 30 mg daily.   7.  Levothyroxine 137 mcg daily.   8.  Lisinopril 20 mg  twice a day.   9.  Metoprolol  mg a day.   10.  Nitroglycerin as needed.   11.  Protonix 40 mg a day.   12.  Zocor 40 mg a day.      ALLERGIES:  NO KNOWN DRUG ALLERGIES.      FAMILY HISTORY:  His father had oral cancer.  His mother had diabetes.      SOCIAL HISTORY:  He smoked in the past but quit several years ago.  He lives with his wife.      REVIEW OF SYSTEMS:  A complete 10-point review of systems was performed and was negative except for that described in the history of present illness.      PHYSICAL EXAMINATION:   VITAL SIGNS:  Blood pressure is 113/71, pulse 94, temperature 98.1, oxygen saturation 96% on room air.   GENERAL:  He appears tired, pleasant and cooperative, no apparent distress.   HEENT:  Pupils are round and equal.  Conjunctivae, sclerae and lids are within normal limits. Oropharynx is pink and moist.  Teeth and lips are within normal limits.   NECK:  Supple, with no masses, no thyromegaly.  CARDIOVASCULAR:  Heart irregularly irregular rhythm, no murmurs.   LUNGS:  Clear to auscultation bilaterally with normal respiratory effort.   ABDOMEN:  Soft, nontender.  There are no masses.  Bowel sounds are active.   EXTREMITIES:  He has a large crusted-over ulcer on the plantar aspect of his first MTP joint in the left foot.  There is no surrounding cellulitis or drainage.  His right third toe is red and swollen.  There is a wound on the distal plantar aspect of his right third toe and has some drainage.  There is no lower extremity edema.   NEUROLOGIC: Decreased sensation in the legs and feet bilaterally. Strength is intact in all 4 extremities.  Cranial nerves II through XII are grossly intact.   PSYCHIATRIC:  Mood and affect appear within normal limits.   SKIN:  Please see extremity exam above.      LABORATORY DATA:  Basic metabolic panel is unremarkable.  Bilirubin is 2.3, AST and ALT are within normal limits.  Blood glucose was 214.  White blood cell count is 11.4, hemoglobin 13.9 and  platelets 180.  Urinalysis is positive for glucose.      I reviewed previous medical records in Epic and I discussed the case with the ER physician.      ASSESSMENT AND PLAN:  Mr. Elder is a 68-year-old man who presents with apparent right third toe cellulitis and possible osteomyelitis.  He came in with constitutional symptoms of generalized weakness, lightheadedness and dry heaving.  He has a history of diabetes mellitus with neuropathy, chronic foot ulcers, hypertension, osteoarthritis, CVA, coronary artery disease and chronic atrial fibrillation.   1.  Right third toe cellulitis with concern for potential osteomyelitis.  It seems he has had the distal right third toe ulcer for quite a while.  Looking at previous provider notes, they indicate the toe has not been red and swollen in the past like it appears now.  It also seems that he gets the constitutional symptoms described above when he has a soft tissue infection.  We will begin Zosyn.  An MRI of the right foot is pending.  We will request Infectious Disease consultation and also request Dr. Harp from [facility] consult, both regarding the third toe.  We will see if his constitutional symptoms improve with treatment of apparent infection.   2.  Elevated bilirubin.  The clinical significance is unclear.  Bilirubins were all normal on previous blood draws.  We will recheck tomorrow and further assess if it appears indicated.  His AST and ALT are normal.   3.  Atrial fibrillation.  His rate is well controlled.  We will continue with metoprolol.  We will hold Eliquis in case he requires surgery in the next couple of days.  We also will hold Plavix for now.   4.  Coronary artery disease, currently asymptomatic.  As mentioned above, we will hold Plavix in case of possible surgery in the next couple of days.  We will continue with lisinopril, metoprolol and Zocor.   5.  Hypertension.  According to the ER, his blood pressure was somewhat low when first  picked up by EMS, although we do not have definite values.  His blood pressure is now improved.  Will continue with lisinopril and metoprolol with parameters.   6.  Hypothyroidism.  Continue with levothyroxine.         JOSLYN JOYNER MD             D: 2018   T: 2018   MT:       Name:     PORTER YANCEY   MRN:      [id number]        Account:      [id number]   :      1950        Admitted:     2018                   Document: [id number]       cc: Joslyn Harp MD

## 2018-09-07 NOTE — PHARMACY
Consult re: affording medications.  Patient has Medicare D coverage through [facility] with fulfilled deductible.      Patient is currently paying $83/mo for Eliquis and $73/box for Toujeo.  I would estimate that these will stay the same prices for the rest of 2018.      Provided patient information on Social Security subsidy for Medicare D.  Patient says he applied for  assistance programs for Toujeo and Eliquis but never heard back.  I provided new applications and encouraged pt to call and see what is going on with his previous applications.  Advised patient to consider a different Medicare D plan for 2019.      GIUSEPPE Rhodes, Pharmacy Technician/Liaison, Discharge Pharmacy [phone]

## 2018-09-07 NOTE — PROVIDER NOTIFICATION
Dr. Brunson notified of update to blood cultures drawn from L hand on 9/6. Nursing will continue to monitor.

## 2018-09-07 NOTE — PHARMACY-ADMISSION MEDICATION HISTORY
Admission medication history interview status for this patient is complete. See [facility] admission navigator for allergy information, prior to admission medications and immunization status.     Medication history interview source(s):Patient  Medication history resources (including written lists, pill bottles, clinic record):Epic    Changes made to PTA medication list:  Added: none  Deleted: none  Changed: From Lantus to Toujeo    Medication reconciliation/reorder completed by provider prior to medication history? No    For patients on insulin therapy: Y (Y/N)  Toujeo - 35 units BID    Prior to Admission medications    Medication Sig Last Dose Taking? Auth Provider   amLODIPine (NORVASC) 5 MG tablet Take 1 tablet (5 mg) by mouth daily 9/5/2018 at Unknown time Yes Johnathan Mckeon MD   apixaban ANTICOAGULANT (ELIQUIS) 5 MG tablet Take by mouth 2 times daily 9/5/2018 at Unknown time Yes Reported, Patient   Cholecalciferol (D3 ADULT PO) Take 2,000 Units by mouth every evening  9/5/2018 at Unknown time Yes Reported, Patient   clopidogrel (PLAVIX) 75 MG tablet Take 1 tablet (75 mg) by mouth daily 9/5/2018 at Unknown time Yes Johnathan Mckeon MD   insulin glargine U-300 (TOUJEO) 300 UNIT/ML injection Inject 35 Units Subcutaneous 2 times daily 9/5/2018 at pm Yes Unknown, Entered By History   insulin pen needle 31G X 6 MM Use  as directed.  Yes Vinicio Cook MD   isosorbide mononitrate (IMDUR) 30 MG 24 hr tablet Take 1 tablet (30 mg) by mouth daily 9/5/2018 at Unknown time Yes Johnathan Mckeon MD   levothyroxine (SYNTHROID, LEVOTHROID) 137 MCG tablet Take 137 mcg by mouth At Bedtime  9/5/2018 at Unknown time Yes Henrry Cheney PA-C   lisinopril (PRINIVIL/ZESTRIL) 20 MG tablet Take 1 tablet (20 mg) by mouth 2 times daily 9/5/2018 at Unknown time Yes Johnathan Mckeon MD   metoprolol succinate (TOPROL-XL) 100 MG 24 hr tablet Take 1 tablet (100 mg) by mouth At Bedtime 9/5/2018 at Unknown time Yes Johnathan Mckeon  MD MAYURI   nitroglycerin (NITROSTAT) 0.4 MG sublingual tablet Place 1 tablet (0.4 mg) under the tongue every 5 minutes as needed for chest pain  Yes Davion Cordova PA-C   pantoprazole (PROTONIX) 40 MG enteric coated tablet Take 1 tablet (40 mg) by mouth every morning (before breakfast) 9/5/2018 at Unknown time Yes Ana Frankel MD   silver sulfADIAZINE (SILVADENE) 1 % cream Apply topically daily 9/5/2018 at Unknown time Yes Татьяна Mckeon DPM, Podiatry/Foot and Ankle Surgery   simvastatin (ZOCOR) 40 MG tablet Take 1 tablet (40 mg) by mouth At Bedtime 9/5/2018 at Unknown time Yes Johnathan Mckeon MD

## 2018-09-07 NOTE — PROGRESS NOTES
"I saw Jayro this morning and he is doing \"the same\". He had consulted with Dr Harp earlier this week and was scheduled for and I&D and wound debridement (Right) on Monday. Since his visit with Dr Harp his symptoms have not changed. I spoke with Flavio (Dr Harp's PA) and we agreed he is ok to DC home today and proceed with surgery as scheduled on Monday.   "

## 2018-09-07 NOTE — PLAN OF CARE
Problem: Patient Care Overview  Goal: Plan of Care/Patient Progress Review  Outcome: No Change  Pt up with SBA, gait steady. Pt is alert and oriented. . Denies any pain or nausea. Wounds noted to right third toe and left inner plantar ball of foot. Both wounds appear necrotic. Iodine applied, abd pad and kerlix wrap to cover. Pt reports he does have baseline N/T in bilat hands and feet, feet more than hands.

## 2018-09-07 NOTE — PROGRESS NOTES
I received a call from Dr Jaquez regarding this patient's care plan.  Per the patient he is scheduled for surgery on his left foot next week.  He reports no change in the foot since his last visit.  He is wondering if the patient can discharge and follow up for surgery as scheduled.  If the patient is stable on antibiotics, this could be done.      I looked into his surgery, which was scheduled for October.  He is not scheduled, at this time, for an amputation on either foot, but for a left foot I & D.  His surgery for the left foot was moved up to Tuesday next week.   If he goes home, we will have the patient come in to clinic Monday to evaluate the right 3rd toe to see if an additional procedure needs to be done.  Patient reports being told he will have to stay in the hospital as long as he is on IV antibiotics.    The patient's MRI was on his left foot, but the greater concern is for the right 3rd toe.  I asked them to pursue the right foot MRI, since that was what Dr Mir had mentioned in his H & P.  If the concern is for osteomyelitis of the right 3rd toe, an MRI would be warranted.    I received word from KRYSTAL Teague, that the patient is having the right foot MRI.  She also indicates the medicine team does not feel the patient is able to be discharged and will remain inpatient over the weekend.  I will await the MRI results.  If the patient needs to have something done more urgently or if the procedure changes, I will contact her.      Flavoi Baxter PA-C

## 2018-09-07 NOTE — CONSULTS
ID consult dictaed IMP 1 67 yo male acute fever/nausea, likelR 3rd toe/foot , also dhronic L foot issue    REc vanco/zosyn . Await cxs and ortho plans

## 2018-09-07 NOTE — CONSULTS
Consult Date:  2018      ORTHOPEDIC CONSULTATION.      DATE OF SERVICE: 2018.      CHIEF COMPLAINT: Left foot pain.      HISTORY OF PRESENT ILLNESS: This 68-year old man was admitted through the Emergency Room last night. Apparently they were concerned about an altered level of consciousness. Workup for that however has been negative. He is scheduled to have debridement procedure on his foot with Dr. Harp on Monday.       PHYSICAL EXAMINATION:    GENERAL: The patient is alert and oriented. He tells me his foot is really not feeling any different. He has an ulceration over the first MTP joint.   VITAL SIGNS: Stable and not septic.      IMPRESSION: Infected ulcer, right foot.      PLAN: I had a discussion with the patient. We also talked with Dr. Harp's physician assistant Kj. The plan is that he will be discharged from the hospital today and followup with Dr. Harp for surgery on his foot per the original plan on Monday.         YAIR MENDEZ MD             D: 2018   T: 2018   MT: NANCY      Name:     PORTER YANCEY   MRN:      [id number]        Account:       [id number]   :      1950           Consult Date:  2018      Document: [id number]       cc: Yair Mendez MD

## 2018-09-07 NOTE — ED NOTES
[facility]  ED Nurse Handoff Report    Jayro Elder is a 68 year old male   ED Chief complaint: Altered Mental Status  . ED Diagnosis:   Final diagnoses:   Altered mental status, unspecified altered mental status type   Diabetic ulcer of left midfoot associated with diabetes mellitus of other type, unspecified ulcer stage (H)   Hypothyroidism, unspecified type     Allergies:   Allergies   Allergen Reactions     No Known Allergies        Code Status: Full Code  Activity level - Baseline/Home:  Independent. Activity Level - Current:   Stand with Assist. Lift room needed: No. Bariatric: No   Needed: No   Isolation: No. Infection: Not Applicable.     Vital Signs:   Vitals:    09/06/18 1855 09/06/18 1858 09/06/18 1859 09/06/18 1900   BP: 115/73   113/71   Resp:  14  15   Temp:       TempSrc:       SpO2: 97%  97% 96%   Weight:           Cardiac Rhythm:  ,   Cardiac  Cardiac Rhythm: Atrial fibrillation  Pain level:    Patient confused: No. Patient Falls Risk: Yes.   Elimination Status: Has voided   Patient Report - Initial Complaint: AMS. Focused Assessment:    Cardiac Cardiac Monitoring - EKG Monitoring: Yes Cardiac Regularity: Irregular Cardiac Rhythm: Atrial fibrillation   Neurological Cognitive/Perceptual/Neuro - Level Of Consciousness: alert Arousal Level: opens eyes spontaneously; arouses to voice Orientation: oriented x 4 Best Language: 0 - No aphasia Mood/Behavior: flat affect; cooperative Headache: None  Pupils (CN II) - Pupil PERRLA: yes  Motor Strength - Left Upper: 4 - active movement against gravity and some resistance Right Upper: 4 - active movement against gravity and some resistance Left Lower: 4 - active movement against gravity and some resistance Right Lower: 4 - active movement against gravity and some resistance   Respiratory Respiratory - Lung Fields: MARIA E; LLL; RUL; RML; RLL MARIA E Breath Sounds: Posterior:; diminished LLL Breath Sounds: Posterior:; crackles fine RUL Breath  Sounds: Posterior:; clear RML Breath Sounds: Posterior:; clear RLL Breath Sounds: Posterior:; clear   Skin Color/Condition Skin - Skin Comment: wound to right toe, bottom of left foot.        Tests Performed: labs, imaging  Labs Ordered and Resulted from Time of ED Arrival Up to the Time of Departure from the ED   CBC WITH PLATELETS DIFFERENTIAL - Abnormal; Notable for the following:        Result Value    WBC 11.4 (*)     Absolute Neutrophil 10.5 (*)     Absolute Lymphocytes 0.2 (*)     All other components within normal limits   COMPREHENSIVE METABOLIC PANEL - Abnormal; Notable for the following:     Glucose 214 (*)     Calcium 8.1 (*)     Bilirubin Total 2.3 (*)     Protein Total 6.4 (*)     All other components within normal limits   ROUTINE UA WITH MICROSCOPIC - Abnormal; Notable for the following:     Glucose Urine >499 (*)     Ketones Urine 20 (*)     Mucous Urine Present (*)     All other components within normal limits   INR - Abnormal; Notable for the following:     INR 1.36 (*)     All other components within normal limits   ISTAT  GASES LACTATE ZAHRA POCT - Abnormal; Notable for the following:     PCO2 Venous 37 (*)     All other components within normal limits   LACTIC ACID WHOLE BLOOD   TSH WITH FREE T4 REFLEX   PULSE OXIMETRY NURSING   CARDIAC CONTINUOUS MONITORING   MEASURE URINE OUTPUT   PATIENT CARE ORDER   ISTAT CG4 GASES LACTATE ZAHRA NURSING POCT   URINE CULTURE AEROBIC BACTERIAL   WOUND CULTURE AEROBIC BACTERIAL   BLOOD CULTURE   BLOOD CULTURE     XR Chest 2 Views   Preliminary Result   IMPRESSION: Heart size is upper normal. Pulmonary vasculature is not   distended. No focal infiltrates or evidence of pleural fluid. Healed   left rib fractures.        Foot XR, G/E 3 views, left   Preliminary Result   IMPRESSION: There is soft tissue irregularity adjacent to the medial   aspect of the first MTP joint. There are degenerative changes here but   no lytic or destructive changes. No definite x-ray  evidence of   osteomyelitis. Degenerative changes in the midfoot.        Head CT w/o contrast   Preliminary Result   IMPRESSION:    No acute intracranial abnormality.         MR Foot Left w/o & w Contrast    (Results Pending)   Treatments provided: IVF, ABX  Family Comments: at bedside  OBS brochure/video discussed/provided to patient:  Yes  ED Medications:   Medications   sodium chloride 0.9% infusion (not administered)   vancomycin (VANCOCIN) 2,750 mg in sodium chloride 0.9 % 500 mL intermittent infusion (2,750 mg Intravenous New Bag 9/6/18 1756)   0.9% sodium chloride BOLUS (0 mLs Intravenous Stopped 9/6/18 1755)   piperacillin-tazobactam (ZOSYN) intermittent infusion 4.5 g (0 g Intravenous Stopped 9/6/18 1755)     Drips infusing:  No  For the majority of the shift, the patient's behavior Green. Interventions performed were NA.     Severe Sepsis OR Septic Shock Diagnosis Present: No      ED Nurse Name/Phone Number: Leslie Wiley,   7:14 PM     RECEIVING UNIT ED HANDOFF REVIEW    Above ED Nurse Handoff Report was reviewed: Yes  Reviewed by: JESS ZAMARRIPA on September 6, 2018 at 8:33 PM

## 2018-09-07 NOTE — PROGRESS NOTES
Patient sees Dr. Harp for right foot - is on schedule for 3rd toe amputation on 10/3 - will further discuss with his team    Formal consult note to follow  Che Ray PAC

## 2018-09-07 NOTE — PROGRESS NOTES
[facility]  Hospitalist Progress Note  Patient Name: Jayro Elder    MRN: [id number]  Provider:  Lorraine Brunson MD  Date:09/07/2018      Initial presenting complaint/issue to hospital (Diagnosis): Generalized weakness dizziness    Summary of Stay: Jayro Elder is a 68 year old male with history of coronary artery disease status post PCI in 2017, a fib on Eliquis, hypertension, insulin-dependent type 2 diabetes with neuropathy, chronic diabetic foot ulcers admitted on 9/6/2018 with R toe cellulitis.   He has known left diabetic foot ulcer for which he has been followed by orthopedics as outpatient and was scheduled to have I&D in October however MRI on admission showed underlying osteomyelitis so surgery was preponed 2 next week Tuesday, 9/11/2018.  Patient has new right toe cellulitis and ulcer.  MRI of right toe today shows right toe cellulitis.  Infectious disease has been consulted and they recommend IV antibiotics until surgical intervention is performed.         Assessment and Plan:      Right third toe cellulitis with osteomyelitis.  It seems he has had the distal right third toe ulcer for quite a while.  He has active cellulitis of his third toe along with constitutional symptoms concerning for active infection.  MRI of right foot was done today which showed osteomyelitis.  Patient had MASON of bilateral lower extremity in March 2018 which showed no flow restriction so his right lower extremity changes are unlikely to be secondary to peripheral arterial disease.  --Patient was started on IV Zosyn on admission which will be continued  --Appreciate infectious disease consultation, orthopedics consulted  --PT OT after surgery    Chronic left diabetic foot ulcer with osteomyelitis: Patient was already scheduled for surgery with orthopedics as outpatient in October which has been preponed to next week Tuesday 9/11/2018.   --Ortho consult, ID following  --Continue IV Zosyn    Strep agalactiae  bacteremia: Likely source is right third toe wound.  2/2 blood cultures are positive  --Appreciate infectious disease consultation  --Continue IV Zosyn, repeat blood cultures tomorrow    Chronic A. fib: Patient is on Eliquis and metoprolol  mg at bedtime.  Will resume metoprolol  --Continue Eliquis for now until surgical plan is finalized  --Monitor on remote telemetry    Coronary artery disease: History of stent placement, lasting June 2017.  On Plavix and Eliquis along with statin prior to admission.  --Continue  statin, beta blockers  -Continue Eliquis   --Hold plavix until surgical plan is finalized    Insulin-dependent type 2 diabetes: Uncontrolled.  A1c 9.4 on admission likely due to underlying infectious process.  Patient reports good compliance to medication and diet.  --Continue to monitor blood glucose and adjust insulin as needed    Hypothyroidism: Resume levothyroxine 137 mcg daily  GERD: Continue Protonix 40 mg daily  History of CVA: Resume Eliquis, Plavix, simvastatin, antihypertensives      # Pain Assessment:  Current Pain Score 9/7/2018   Patient currently in pain? denies   Pain score (0-10) -   Pain location -   Pain descriptors -   Jayro s pain level was assessed and he currently denies pain.       DVT Prophylaxis:  -Eliquis  Code Status: Full Code  Discharge Dispo: To be determined pending orthopedic intervention  Estimated Disch Date / # of Days until Discharge: >3 days        Interval History:      Patient denies any complaints  He does not have much in terms of pain in the lower extremities  No further fevers or chills after admission    Discussed care with infectious disease consultant        Data reviewed today: I reviewed all new labs and imaging reports over the last 24 hours. I personally reviewed the MRI right foot image(s) showing Osteomyelitis.         Physical Exam:      Last Vital Signs:  /76 (BP Location: Left arm)  Pulse 76  Temp 98.1  F (36.7  C) (Oral)  Resp 18  Ht  "1.93 m (6' 4\")  Wt 115.1 kg (253 lb 11.2 oz)  SpO2 94%  BMI 30.88 kg/m2  GENERAL: No apparent distress. Awake, alert, and fully oriented.  HEENT: Normocephalic, atraumatic. Extraocular movements intact.  CARDIOVASCULAR: Regular rate and rhythm without murmurs or rubs. No S3.  PULMONARY: Clear bilaterally.  ABDOMINAL: Soft, non-tender, non-distended. Bowel sounds normoactive. No hepatosplenomegaly.  EXTREMITIES: No cyanosis or clubbing. No edema.  NEUROLOGICAL: CN 2-12 grossly intact, no focal neurological deficits.  DERMATOLOGICAL: No rash, ulcer, ecchymoses, jaundice.  PSYCH: appropriate           Medications:      All current medications were reviewed.         Data:      All new lab and imaging data was reviewed.   Data       Recent Labs  Lab 09/07/18  0744 09/06/18  1542   WBC 8.3 11.4*   HGB 12.0* 13.9   HCT 35.3* 40.8   MCV 88 87    180       Recent Labs  Lab 09/07/18  0744 09/06/18  1542    135   POTASSIUM 3.8 3.7   CHLORIDE 105 102   CO2 23 24   ANIONGAP 9 9   * 214*   BUN 16 12   CR 1.09 0.85   GFRESTIMATED 67 90   GFRESTBLACK 81 >90   BETTIE 7.8* 8.1*       Recent Results (from the past 24 hour(s))   Head CT w/o contrast    Narrative    CT SCAN OF THE HEAD WITHOUT CONTRAST   9/6/2018 6:39 PM     HISTORY: Confusion.    TECHNIQUE: Axial images of the head and coronal reformations without  IV contrast material. Radiation dose for this scan was reduced using  automated exposure control, adjustment of the mA and/or kV according  to patient size, or iterative reconstruction technique.    COMPARISON: 9/6/2018    FINDINGS:  Moderate volume loss is present. Patchy white matter hypoattenuation  likely represents chronic small vessel ischemic change. Old right  frontal and left occipital infarcts are unchanged. No evidence of  acute ischemia, hemorrhage, mass, mass effect, or hydrocephalus. The  visualized calvarium, skull base, paranasal sinuses, and extracranial  soft tissues are unremarkable.   "    Impression    IMPRESSION:   No acute intracranial abnormality.    TATI WALKER MD   XR Chest 2 Views    Narrative    CHEST TWO VIEWS    9/6/2018 6:50 PM     HISTORY: Fever.    COMPARISON: Chest CT from 3/3/2018. Chest x-ray from 1/14/2018.      Impression    IMPRESSION: Heart size is upper normal. Pulmonary vasculature is not  distended. No focal infiltrates or evidence of pleural fluid. Healed  left rib fractures.      ELIZA ALEXANDER MD   Foot XR, G/E 3 views, left    Narrative    LEFT FOOT THREE OR MORE VIEWS     9/6/2018 6:50 PM     HISTORY: Wound, evaluate for osteo.    COMPARISON: None.      Impression    IMPRESSION: There is soft tissue irregularity adjacent to the medial  aspect of the first MTP joint. There are degenerative changes here but  no lytic or destructive changes. No definite x-ray evidence of  osteomyelitis. Degenerative changes in the midfoot.      ELIZA ALEXANDER MD   MR Foot Left w/o & w Contrast    Narrative    MR LEFT FOOT WITHOUT AND WITH CONTRAST  9/6/2018 10:49 PM    HISTORY: History of chronic ulcer, confusion, fever, evaluate for  osteomyelitis.    COMPARISON: X-ray from 9/6/2018.    TECHNIQUE: Routine multiplanar, multisequence imaging was performed.  12 mL Gadavist    FINDINGS:   There are prominent first MTP joint degenerative changes with some  subchondral edema. There are some erosive changes on the medial aspect  of the first metatarsal head and mild adjacent bone marrow edema. This  could also be degenerative or inflammatory in nature but early  osteomyelitis of the medial first metatarsal head not excluded. No  evidence of abscess or significant soft tissue edema elsewhere.  Moderate midfoot degenerative changes.      Impression    IMPRESSION:  1. Degenerative changes at the first MTP joint with associated  degenerative edema. There is an ulcer medially in this region and some  of the edema that is more prominent in the medial aspect of the first  metatarsal head could be  related to early osteomyelitis in the  appropriate clinical setting.  2. No evidence of abscess.    MD Lorraine REDDING MD  Hospitalist  [facility] [address] [place] [address]

## 2018-09-08 ENCOUNTER — APPOINTMENT (OUTPATIENT)
Dept: CARDIOLOGY | Facility: CLINIC | Age: 68
DRG: 617 | End: 2018-09-08
Attending: HOSPITALIST
Payer: MEDICARE

## 2018-09-08 LAB
ANION GAP SERPL CALCULATED.3IONS-SCNC: 6 MMOL/L (ref 3–14)
BUN SERPL-MCNC: 18 MG/DL (ref 7–30)
CALCIUM SERPL-MCNC: 8.3 MG/DL (ref 8.5–10.1)
CHLORIDE SERPL-SCNC: 105 MMOL/L (ref 94–109)
CO2 SERPL-SCNC: 27 MMOL/L (ref 20–32)
CREAT SERPL-MCNC: 1.11 MG/DL (ref 0.66–1.25)
CRP SERPL-MCNC: 81.6 MG/L (ref 0–8)
ERYTHROCYTE [DISTWIDTH] IN BLOOD BY AUTOMATED COUNT: 12.8 % (ref 10–15)
ERYTHROCYTE [SEDIMENTATION RATE] IN BLOOD BY WESTERGREN METHOD: 15 MM/H (ref 0–20)
GFR SERPL CREATININE-BSD FRML MDRD: 66 ML/MIN/1.7M2
GLUCOSE BLDC GLUCOMTR-MCNC: 138 MG/DL (ref 70–99)
GLUCOSE BLDC GLUCOMTR-MCNC: 152 MG/DL (ref 70–99)
GLUCOSE BLDC GLUCOMTR-MCNC: 177 MG/DL (ref 70–99)
GLUCOSE BLDC GLUCOMTR-MCNC: 188 MG/DL (ref 70–99)
GLUCOSE BLDC GLUCOMTR-MCNC: 190 MG/DL (ref 70–99)
GLUCOSE SERPL-MCNC: 158 MG/DL (ref 70–99)
HCT VFR BLD AUTO: 36.1 % (ref 40–53)
HGB BLD-MCNC: 12.1 G/DL (ref 13.3–17.7)
MCH RBC QN AUTO: 29.4 PG (ref 26.5–33)
MCHC RBC AUTO-ENTMCNC: 33.5 G/DL (ref 31.5–36.5)
MCV RBC AUTO: 88 FL (ref 78–100)
PLATELET # BLD AUTO: 148 10E9/L (ref 150–450)
POTASSIUM SERPL-SCNC: 3.5 MMOL/L (ref 3.4–5.3)
RBC # BLD AUTO: 4.11 10E12/L (ref 4.4–5.9)
SODIUM SERPL-SCNC: 138 MMOL/L (ref 133–144)
TROPONIN I SERPL-MCNC: <0.015 UG/L (ref 0–0.04)
TROPONIN I SERPL-MCNC: <0.015 UG/L (ref 0–0.04)
WBC # BLD AUTO: 5 10E9/L (ref 4–11)

## 2018-09-08 PROCEDURE — 36415 COLL VENOUS BLD VENIPUNCTURE: CPT | Performed by: INTERNAL MEDICINE

## 2018-09-08 PROCEDURE — A9270 NON-COVERED ITEM OR SERVICE: HCPCS | Mod: GY | Performed by: INTERNAL MEDICINE

## 2018-09-08 PROCEDURE — 85027 COMPLETE CBC AUTOMATED: CPT | Performed by: HOSPITALIST

## 2018-09-08 PROCEDURE — 12000000 ZZH R&B MED SURG/OB

## 2018-09-08 PROCEDURE — 85652 RBC SED RATE AUTOMATED: CPT | Performed by: HOSPITALIST

## 2018-09-08 PROCEDURE — 86140 C-REACTIVE PROTEIN: CPT | Performed by: HOSPITALIST

## 2018-09-08 PROCEDURE — 80048 BASIC METABOLIC PNL TOTAL CA: CPT | Performed by: HOSPITALIST

## 2018-09-08 PROCEDURE — 00000146 ZZHCL STATISTIC GLUCOSE BY METER IP

## 2018-09-08 PROCEDURE — 84484 ASSAY OF TROPONIN QUANT: CPT | Performed by: HOSPITALIST

## 2018-09-08 PROCEDURE — 93306 TTE W/DOPPLER COMPLETE: CPT | Mod: 26 | Performed by: INTERNAL MEDICINE

## 2018-09-08 PROCEDURE — A9270 NON-COVERED ITEM OR SERVICE: HCPCS | Mod: GY | Performed by: HOSPITALIST

## 2018-09-08 PROCEDURE — 84484 ASSAY OF TROPONIN QUANT: CPT | Performed by: INTERNAL MEDICINE

## 2018-09-08 PROCEDURE — 25000128 H RX IP 250 OP 636: Performed by: INTERNAL MEDICINE

## 2018-09-08 PROCEDURE — 25500064 ZZH RX 255 OP 636: Performed by: INTERNAL MEDICINE

## 2018-09-08 PROCEDURE — 99232 SBSQ HOSP IP/OBS MODERATE 35: CPT | Performed by: HOSPITALIST

## 2018-09-08 PROCEDURE — 93306 TTE W/DOPPLER COMPLETE: CPT

## 2018-09-08 PROCEDURE — 36415 COLL VENOUS BLD VENIPUNCTURE: CPT | Performed by: HOSPITALIST

## 2018-09-08 PROCEDURE — 87040 BLOOD CULTURE FOR BACTERIA: CPT | Performed by: HOSPITALIST

## 2018-09-08 PROCEDURE — 25000132 ZZH RX MED GY IP 250 OP 250 PS 637: Mod: GY | Performed by: HOSPITALIST

## 2018-09-08 PROCEDURE — 25000132 ZZH RX MED GY IP 250 OP 250 PS 637: Mod: GY | Performed by: INTERNAL MEDICINE

## 2018-09-08 RX ORDER — TRAMADOL HYDROCHLORIDE 50 MG/1
50 TABLET ORAL EVERY 6 HOURS PRN
Status: DISCONTINUED | OUTPATIENT
Start: 2018-09-08 | End: 2018-09-11

## 2018-09-08 RX ADMIN — Medication 1 MG: at 23:52

## 2018-09-08 RX ADMIN — ISOSORBIDE MONONITRATE 30 MG: 30 TABLET, EXTENDED RELEASE ORAL at 07:49

## 2018-09-08 RX ADMIN — TAZOBACTAM SODIUM AND PIPERACILLIN SODIUM 3.38 G: 375; 3 INJECTION, SOLUTION INTRAVENOUS at 04:57

## 2018-09-08 RX ADMIN — TRAMADOL HYDROCHLORIDE 50 MG: 50 TABLET, COATED ORAL at 21:27

## 2018-09-08 RX ADMIN — LISINOPRIL 20 MG: 20 TABLET ORAL at 07:49

## 2018-09-08 RX ADMIN — AMLODIPINE BESYLATE 5 MG: 5 TABLET ORAL at 07:49

## 2018-09-08 RX ADMIN — TAZOBACTAM SODIUM AND PIPERACILLIN SODIUM 3.38 G: 375; 3 INJECTION, SOLUTION INTRAVENOUS at 10:52

## 2018-09-08 RX ADMIN — APIXABAN 5 MG: 5 TABLET, FILM COATED ORAL at 21:28

## 2018-09-08 RX ADMIN — HUMAN ALBUMIN MICROSPHERES AND PERFLUTREN 3 ML: 10; .22 INJECTION, SOLUTION INTRAVENOUS at 08:45

## 2018-09-08 RX ADMIN — APIXABAN 5 MG: 5 TABLET, FILM COATED ORAL at 07:50

## 2018-09-08 RX ADMIN — SIMVASTATIN 40 MG: 40 TABLET, FILM COATED ORAL at 21:28

## 2018-09-08 RX ADMIN — TAZOBACTAM SODIUM AND PIPERACILLIN SODIUM 3.38 G: 375; 3 INJECTION, SOLUTION INTRAVENOUS at 23:47

## 2018-09-08 RX ADMIN — METOPROLOL SUCCINATE 100 MG: 100 TABLET, EXTENDED RELEASE ORAL at 21:28

## 2018-09-08 RX ADMIN — TAZOBACTAM SODIUM AND PIPERACILLIN SODIUM 3.38 G: 375; 3 INJECTION, SOLUTION INTRAVENOUS at 18:07

## 2018-09-08 RX ADMIN — ACETAMINOPHEN 650 MG: 325 TABLET, FILM COATED ORAL at 10:52

## 2018-09-08 RX ADMIN — PANTOPRAZOLE SODIUM 40 MG: 40 TABLET, DELAYED RELEASE ORAL at 07:49

## 2018-09-08 RX ADMIN — TRAMADOL HYDROCHLORIDE 50 MG: 50 TABLET, COATED ORAL at 12:30

## 2018-09-08 RX ADMIN — LISINOPRIL 20 MG: 20 TABLET ORAL at 21:28

## 2018-09-08 RX ADMIN — ACETAMINOPHEN 650 MG: 325 TABLET, FILM COATED ORAL at 21:27

## 2018-09-08 RX ADMIN — LEVOTHYROXINE SODIUM 137 MCG: 25 TABLET ORAL at 21:28

## 2018-09-08 ASSESSMENT — ACTIVITIES OF DAILY LIVING (ADL)
ADLS_ACUITY_SCORE: 7

## 2018-09-08 ASSESSMENT — PAIN DESCRIPTION - DESCRIPTORS: DESCRIPTORS: ACHING

## 2018-09-08 NOTE — PLAN OF CARE
"Problem: Patient Care Overview  Goal: Plan of Care/Patient Progress Review  Assumed care at 1900. Admitted for diabetic foot ulcers with osteomyelitis.  Surgery scheduled for Tuesday. BC + and continues on IV zosyn. VSS and afebrile. Pt reported chest heaviness (see provider notification) - MD ordered EKG and GI coctail. Pt then denied the chest heaviness and stated that it \"comes and goes\". Received tylenol x1 for discomfort.  - 3u novolog administered with bedtime toujeo. Will continue to monitor.       "

## 2018-09-08 NOTE — PLAN OF CARE
Problem: Patient Care Overview  Goal: Plan of Care/Patient Progress Review  Outcome: No Change  Pt denies pain, states feet are numb. Dressings intact right foot and left middle toe, no drainage noted. Up to BR independently.

## 2018-09-08 NOTE — PROGRESS NOTES
[facility]  Hospitalist Progress Note  Patient Name: Jayro Elder    MRN: [id number]  Provider:  Lorraine Brunson MD  Date:09/08/2018      Initial presenting complaint/issue to hospital (Diagnosis): Generalized weakness dizziness    Summary of Stay: Jayro Elder is a 68 year old male with history of coronary artery disease status post PCI in 2017, a fib on Eliquis, hypertension, insulin-dependent type 2 diabetes with neuropathy, chronic diabetic foot ulcers admitted on 9/6/2018 with R toe cellulitis.   He has known left diabetic foot ulcer for which he has been followed by orthopedics as outpatient and was scheduled to have I&D in October however MRI on admission showed underlying osteomyelitis so surgery was preponed 2 next week Tuesday, 9/11/2018.  Patient has new right toe cellulitis and ulcer.  MRI of right toe today shows right toe cellulitis.  Infectious disease has been consulted and they recommend IV antibiotics until surgical intervention is performed.         Assessment and Plan:      Right third toe cellulitis with osteomyelitis.  It seems he has had the distal right third toe ulcer for quite a while.  He has active cellulitis of his third toe along with constitutional symptoms concerning for active infection.  MRI of right foot was done today which showed osteomyelitis.  Patient had MASON of bilateral lower extremity in March 2018 which showed no flow restriction so his right lower extremity changes are unlikely to be secondary to peripheral arterial disease.  --Patient was started on IV Zosyn on admission which will be continued  --Appreciate infectious disease consultation, orthopedics consulted  --PT OT after surgery    Chronic left diabetic foot ulcer with osteomyelitis: Patient was already scheduled for surgery with orthopedics as outpatient in October which has been preponed to next week Tuesday 9/11/2018.   --Ortho consult, ID following  --Continue IV Zosyn    Strep agalactiae  bacteremia: Likely source is right third toe wound.  2/2 blood cultures are positive from admission   --Appreciate infectious disease consultation  --Continue IV Zosyn, repeat blood cultures tomorrow    Chronic A. fib: Patient is on Eliquis and metoprolol  mg at bedtime.  Will resume metoprolol  --Continue Eliquis for now until surgical plan is finalized  --Monitor on remote telemetry    Coronary artery disease: History of stent placement, lasting June 2017.  On Plavix and Eliquis along with statin prior to admission.  --Continue  statin, beta blockers  -Continue Eliquis   --Hold plavix until surgical plan is finalized    Insulin-dependent type 2 diabetes: Uncontrolled.  A1c 9.4 on admission likely due to underlying infectious process.  Patient reports good compliance to medication and diet.  --Continue to monitor blood glucose and adjust insulin as needed    Hypothyroidism: Resume levothyroxine 137 mcg daily  GERD: Continue Protonix 40 mg daily  History of CVA: Resume Eliquis, Plavix, simvastatin, antihypertensives      # Pain Assessment:  Current Pain Score 9/8/2018   Patient currently in pain? -   Pain score (0-10) 6   Pain location -   Pain descriptors -   Jayro s pain level was assessed and he currently denies pain.       DVT Prophylaxis:  -Eliquis  Code Status: Full Code  Discharge Dispo: To be determined pending orthopedic intervention  Estimated Disch Date / # of Days until Discharge: >3 days        Interval History:      Patient denies any complaints  He does not have much in terms of pain in the lower extremities  No further fevers or chills after admission    Discussed care with infectious disease consultant        Data reviewed today: I reviewed all new labs and imaging reports over the last 24 hours. I personally reviewed the MRI right foot image(s) showing Osteomyelitis.         Physical Exam:      Last Vital Signs:  /89 (BP Location: Left arm)  Pulse 76  Temp 96.5  F (35.8  C) (Oral)   "Resp 16  Ht 1.93 m (6' 4\")  Wt 115.1 kg (253 lb 11.2 oz)  SpO2 92%  BMI 30.88 kg/m2  GENERAL: No apparent distress. Awake, alert, and fully oriented.  HEENT: Normocephalic, atraumatic. Extraocular movements intact.  CARDIOVASCULAR: Regular rate and rhythm without murmurs or rubs. No S3.  PULMONARY: Clear bilaterally.  ABDOMINAL: Soft, non-tender, non-distended. Bowel sounds normoactive. No hepatosplenomegaly.  EXTREMITIES: No cyanosis or clubbing. No edema.  NEUROLOGICAL: CN 2-12 grossly intact, no focal neurological deficits.  DERMATOLOGICAL: No rash, ulcer, ecchymoses, jaundice.  PSYCH: appropriate           Medications:      All current medications were reviewed.         Data:      All new lab and imaging data was reviewed.   Data       Recent Labs  Lab 09/08/18  0614 09/07/18  0744 09/06/18  1542   WBC 5.0 8.3 11.4*   HGB 12.1* 12.0* 13.9   HCT 36.1* 35.3* 40.8   MCV 88 88 87   * 152 180       Recent Labs  Lab 09/08/18  0614 09/07/18  0744 09/06/18  1542    137 135   POTASSIUM 3.5 3.8 3.7   CHLORIDE 105 105 102   CO2 27 23 24   ANIONGAP 6 9 9   * 248* 214*   BUN 18 16 12   CR 1.11 1.09 0.85   GFRESTIMATED 66 67 90   GFRESTBLACK 80 81 >90   BETTIE 8.3* 7.8* 8.1*       No results found for this or any previous visit (from the past 24 hour(s)).    Lorraine Brunson MD  Hospitalist  [facility]  [address] [place] [address]  "

## 2018-09-08 NOTE — PLAN OF CARE
Problem: Patient Care Overview  Goal: Plan of Care/Patient Progress Review  Outcome: No Change  Ambulatory Status:  Pt up SBA.  VS:  VSS - elevated BPs before AM meds  Pain:  6/10 pain Tylenol not effective received orders for PO tramadol  Resp: LS CTA  GI:  Denies nausea. Good appetite and on Mod Cho diet.  BS +.  Passing flatus.  Last BM This AM.  :  Voiding without difficulty  Skin:   Wounds noted to right third toe and left inner plantar ball of foot- dressings CDI  Tx:  IV Zosyn  Consults:  ID Ortho  Disposition:  TBD - Surgery Tuesday?     Will continue to monitor and provide supportive cares.

## 2018-09-08 NOTE — PROVIDER NOTIFICATION
Pt took Tylenol for 6/10 pain bilateral foot pain. Not effective still 6/10 45 min later. Can we try something else for pain?

## 2018-09-08 NOTE — PLAN OF CARE
Problem: Patient Care Overview  Goal: Plan of Care/Patient Progress Review  Outcome: No Change  Assumed care at 1500, VSS   HA pain controlled with PO tylenol  Wound cleanser used on bilateral foot wounds, dry gauze placed  IV zosyn  , tolerated mod cho diet  ID and ortho consult, nursing will continue to monitor.

## 2018-09-08 NOTE — PROVIDER NOTIFICATION
"FYI - Pt reported a \"chest heaviness\". Stated that this is not new and has been ongoing. Pt does have known heart history and EKG was done yesterday. Denies SOB and VSS.   "

## 2018-09-08 NOTE — PROGRESS NOTES
X-Cover    Notified about pt having episode of chest heaviness. He has hx of CAD, his Clopidogrel is currently on hold although he is more than a year out from his stent and also on eliquis.     Pt tells me that the episode came on spontaneously and went away on its own shortly afterwards, pain free now, no associated sx. No sob, diaphoresis. Reports that it sometimes happens at home as well, and not new. He does report that at home increased activity seems to trigger it although it came on rest now. His EKG does not show any significant changes. Has RBBB.     Description of the current episode sounds atypical, although given prior hx I will check troponin x 2

## 2018-09-09 LAB
ANION GAP SERPL CALCULATED.3IONS-SCNC: 6 MMOL/L (ref 3–14)
BACTERIA SPEC CULT: ABNORMAL
BUN SERPL-MCNC: 12 MG/DL (ref 7–30)
CALCIUM SERPL-MCNC: 8.2 MG/DL (ref 8.5–10.1)
CHLORIDE SERPL-SCNC: 104 MMOL/L (ref 94–109)
CO2 SERPL-SCNC: 28 MMOL/L (ref 20–32)
CREAT SERPL-MCNC: 0.95 MG/DL (ref 0.66–1.25)
ERYTHROCYTE [DISTWIDTH] IN BLOOD BY AUTOMATED COUNT: 12.6 % (ref 10–15)
GFR SERPL CREATININE-BSD FRML MDRD: ABNORMAL ML/MIN/1.7M2
GLUCOSE BLDC GLUCOMTR-MCNC: 130 MG/DL (ref 70–99)
GLUCOSE BLDC GLUCOMTR-MCNC: 147 MG/DL (ref 70–99)
GLUCOSE BLDC GLUCOMTR-MCNC: 170 MG/DL (ref 70–99)
GLUCOSE BLDC GLUCOMTR-MCNC: 172 MG/DL (ref 70–99)
GLUCOSE BLDC GLUCOMTR-MCNC: 205 MG/DL (ref 70–99)
GLUCOSE SERPL-MCNC: 126 MG/DL (ref 70–99)
HCT VFR BLD AUTO: 33.6 % (ref 40–53)
HGB BLD-MCNC: 11.4 G/DL (ref 13.3–17.7)
Lab: ABNORMAL
MCH RBC QN AUTO: 29.1 PG (ref 26.5–33)
MCHC RBC AUTO-ENTMCNC: 33.9 G/DL (ref 31.5–36.5)
MCV RBC AUTO: 86 FL (ref 78–100)
PLATELET # BLD AUTO: 156 10E9/L (ref 150–450)
POTASSIUM SERPL-SCNC: 3.4 MMOL/L (ref 3.4–5.3)
RBC # BLD AUTO: 3.92 10E12/L (ref 4.4–5.9)
SODIUM SERPL-SCNC: 138 MMOL/L (ref 133–144)
SPECIMEN SOURCE: ABNORMAL
WBC # BLD AUTO: 4.3 10E9/L (ref 4–11)

## 2018-09-09 PROCEDURE — 99232 SBSQ HOSP IP/OBS MODERATE 35: CPT | Performed by: HOSPITALIST

## 2018-09-09 PROCEDURE — A9270 NON-COVERED ITEM OR SERVICE: HCPCS | Mod: GY | Performed by: HOSPITALIST

## 2018-09-09 PROCEDURE — 85027 COMPLETE CBC AUTOMATED: CPT | Performed by: HOSPITALIST

## 2018-09-09 PROCEDURE — 87040 BLOOD CULTURE FOR BACTERIA: CPT | Performed by: HOSPITALIST

## 2018-09-09 PROCEDURE — 36415 COLL VENOUS BLD VENIPUNCTURE: CPT | Performed by: HOSPITALIST

## 2018-09-09 PROCEDURE — 12000000 ZZH R&B MED SURG/OB

## 2018-09-09 PROCEDURE — A9270 NON-COVERED ITEM OR SERVICE: HCPCS | Mod: GY | Performed by: INTERNAL MEDICINE

## 2018-09-09 PROCEDURE — 99207 ZZC CDG-MDM COMPONENT: MEETS LOW - DOWN CODED: CPT | Performed by: HOSPITALIST

## 2018-09-09 PROCEDURE — 25000128 H RX IP 250 OP 636: Performed by: INTERNAL MEDICINE

## 2018-09-09 PROCEDURE — 80048 BASIC METABOLIC PNL TOTAL CA: CPT | Performed by: HOSPITALIST

## 2018-09-09 PROCEDURE — 00000146 ZZHCL STATISTIC GLUCOSE BY METER IP

## 2018-09-09 PROCEDURE — 25000132 ZZH RX MED GY IP 250 OP 250 PS 637: Mod: GY | Performed by: INTERNAL MEDICINE

## 2018-09-09 PROCEDURE — 25000132 ZZH RX MED GY IP 250 OP 250 PS 637: Mod: GY | Performed by: HOSPITALIST

## 2018-09-09 RX ADMIN — Medication 12.5 MG: at 22:32

## 2018-09-09 RX ADMIN — LISINOPRIL 20 MG: 20 TABLET ORAL at 09:18

## 2018-09-09 RX ADMIN — TAZOBACTAM SODIUM AND PIPERACILLIN SODIUM 3.38 G: 375; 3 INJECTION, SOLUTION INTRAVENOUS at 12:40

## 2018-09-09 RX ADMIN — TAZOBACTAM SODIUM AND PIPERACILLIN SODIUM 3.38 G: 375; 3 INJECTION, SOLUTION INTRAVENOUS at 17:52

## 2018-09-09 RX ADMIN — TRAMADOL HYDROCHLORIDE 50 MG: 50 TABLET, COATED ORAL at 10:46

## 2018-09-09 RX ADMIN — SIMVASTATIN 40 MG: 40 TABLET, FILM COATED ORAL at 22:31

## 2018-09-09 RX ADMIN — APIXABAN 5 MG: 5 TABLET, FILM COATED ORAL at 21:01

## 2018-09-09 RX ADMIN — APIXABAN 5 MG: 5 TABLET, FILM COATED ORAL at 09:18

## 2018-09-09 RX ADMIN — AMLODIPINE BESYLATE 5 MG: 5 TABLET ORAL at 09:18

## 2018-09-09 RX ADMIN — LISINOPRIL 20 MG: 20 TABLET ORAL at 21:01

## 2018-09-09 RX ADMIN — TAZOBACTAM SODIUM AND PIPERACILLIN SODIUM 3.38 G: 375; 3 INJECTION, SOLUTION INTRAVENOUS at 23:49

## 2018-09-09 RX ADMIN — TRAMADOL HYDROCHLORIDE 50 MG: 50 TABLET, COATED ORAL at 17:00

## 2018-09-09 RX ADMIN — PANTOPRAZOLE SODIUM 40 MG: 40 TABLET, DELAYED RELEASE ORAL at 07:36

## 2018-09-09 RX ADMIN — ISOSORBIDE MONONITRATE 30 MG: 30 TABLET, EXTENDED RELEASE ORAL at 09:18

## 2018-09-09 RX ADMIN — INSULIN ASPART 1 UNITS: 100 INJECTION, SOLUTION INTRAVENOUS; SUBCUTANEOUS at 22:31

## 2018-09-09 RX ADMIN — TAZOBACTAM SODIUM AND PIPERACILLIN SODIUM 3.38 G: 375; 3 INJECTION, SOLUTION INTRAVENOUS at 05:57

## 2018-09-09 RX ADMIN — TRAMADOL HYDROCHLORIDE 50 MG: 50 TABLET, COATED ORAL at 23:49

## 2018-09-09 RX ADMIN — LEVOTHYROXINE SODIUM 137 MCG: 25 TABLET ORAL at 22:31

## 2018-09-09 RX ADMIN — METOPROLOL SUCCINATE 100 MG: 100 TABLET, EXTENDED RELEASE ORAL at 22:31

## 2018-09-09 ASSESSMENT — ACTIVITIES OF DAILY LIVING (ADL)
ADLS_ACUITY_SCORE: 7

## 2018-09-09 ASSESSMENT — PAIN DESCRIPTION - DESCRIPTORS
DESCRIPTORS: BURNING;SHOOTING
DESCRIPTORS: SHOOTING;BURNING

## 2018-09-09 NOTE — PLAN OF CARE
Problem: Patient Care Overview  Goal: Plan of Care/Patient Progress Review  Outcome: No Change  Alert and oriented.  Independent.   VSS ex  BP high before evening meds.   RA.  Denies SOB.  Lung sounds clear.  Dressings on right third toe and left inner foot CDI.   Tramadol and tylenol given for shooting pain in feet.   BGs 138 and 190.   New PIV placed.   Continues on IV Zosyn.  Blood cultures pending.   Plan for surgery Tuesday.   ID and ortho following.   Will continue to monitor.

## 2018-09-09 NOTE — PLAN OF CARE
Problem: Patient Care Overview  Goal: Plan of Care/Patient Progress Review  Ambulatory Status:  Pt up independently  VS:  vss  Pain:  Minimal pain this shift, not requiring pain medication  Resp: LS clear  GI:  denies nausea.  Mod cho diet.  BS active.  Passing flatus.  Last BM 9/7.  Skin:  Wounds on bilat feet  Tx:  zosyn  Labs:    Consults:  Ortho, ID  Disposition:  tbd    Scheduled for surgery on Tuesday.

## 2018-09-09 NOTE — PLAN OF CARE
Pt up ind. LS clear. BS hypo. Flatus+ PIV SL. Drsgs changed to R 3rd toe & bottom of L foot. Has neuropathy. Pulses+ Taking tramadol for pain. Tolerating mod cho diet, appetite good, denies nausea.   & 172. Voiding adequate amount. Putting on tele.

## 2018-09-09 NOTE — PROGRESS NOTES
[facility]  Hospitalist Progress Note  Patient Name: Jayro Elder    MRN: [id number]  Provider:  Lorraine Brunson MD  Date:09/09/2018      Initial presenting complaint/issue to hospital (Diagnosis): Generalized weakness dizziness    Summary of Stay: Jayro Elder is a 68 year old male with history of coronary artery disease status post PCI in 2017, a fib on Eliquis, hypertension, insulin-dependent type 2 diabetes with neuropathy, chronic diabetic foot ulcers admitted on 9/6/2018 with R toe cellulitis.   He has known left diabetic foot ulcer for which he has been followed by orthopedics as outpatient and was scheduled to have I&D of left foot ulcer in October however MRI on admission showed underlying osteomyelitis so surgery was preponed to ne this coming Tuesday, 9/11/2018.    Patient has new right toe cellulitis and ulcer noted this time which is the likely cause of his constitutional symptoms leading to the admission.  MRI of right toe showed right toe cellulitis.  Blood culture growing group b strep x2 - infectious disease has been consulted and they recommend IV antibiotics (currently on Zosyn) . Surgery planned for 9/11 per ortho           Assessment and Plan:      Right third toe cellulitis with osteomyelitis.  It seems he has had the distal right third toe ulcer for quite a while.  He has active cellulitis of his third toe along with constitutional symptoms concerning for active infection.  MRI of right foot was done today which showed osteomyelitis.  Patient had MASON of bilateral lower extremity in March 2018 which showed no flow restriction so his right lower extremity so changes are unlikely to be secondary to peripheral arterial disease. wound culture is growing staph aureus and group B strep.  No prior history of MRSA  --Patient was started on IV Zosyn on admission which will be continued  --Appreciate infectious disease consultation, orthopedics consulted  --PT OT after  surgery  --appreciate ortho consult     Chronic left diabetic foot ulcer with osteomyelitis: Patient was already scheduled for surgery with orthopedics as outpatient in October which has been preponed to next week Tuesday 9/11/2018.   --Ortho consult, ID following  --Continue IV Zosyn    Strep agalactiae bacteremia: Likely source is right third toe wound.  2/2 blood cultures are positive from admission   --Appreciate infectious disease consultation  --Continue IV Zosyn, repeat blood cultures have been -ve    Chest pain: resolved. pt developed chest pain on 9/7 night. C/o substernal heaviness at rest -was constant for almost 24 hrs then resolved spontaneously. Given his hx -trops were checked and remained -ve. TTE was performed which showed EF 35% with WMA but these are stable findings since the last 2 years. PE was considered but pt is on eliquis and he had no other HD instability.   --remote tele    Chronic A. fib: Patient is on Eliquis and metoprolol  mg at bedtime.  Will resume metoprolol  --Continue Eliquis for now, hold further dose after tomorrow morning in anticipation of surgery on Tuesday   --Monitor on remote telemetry    Coronary artery disease: History of stent placement, lasting June 2017.  On Plavix and Eliquis along with statin prior to admission.  --Continue  statin, beta blockers  -Continue Eliquis   --Hold plavix until surgical plan is finalized    Insulin-dependent type 2 diabetes: Uncontrolled.  A1c 9.4 on admission likely due to underlying infectious process.  Patient reports good compliance to medication and diet.  --Continue to monitor blood glucose and adjust insulin as needed    Hypothyroidism: Resume levothyroxine 137 mcg daily  GERD: Continue Protonix 40 mg daily  History of CVA: Resume Eliquis, Plavix, simvastatin, antihypertensives      # Pain Assessment:  Current Pain Score 9/9/2018   Patient currently in pain? denies   Pain score (0-10) 5   Pain location -   Pain descriptors -  "  Jayro s pain level was assessed and he currently denies pain.       DVT Prophylaxis:  -Eliquis, hold after tomorrow morning- please resume after surgery   Code Status: Full Code  Discharge Dispo: To be determined pending orthopedic intervention  Estimated Disch Date / # of Days until Discharge: >3 days, may need TCU and pt is agreeable if it is needed         Interval History:      Patient denies any complaints  He does not have much in terms of pain in the lower extremities  No further fevers or chills after admission  No more chest pain     Data reviewed today: I reviewed all new labs and imaging reports over the last 24 hours. I personally reviewed the MRI right foot image(s) showing Osteomyelitis.         Physical Exam:      Last Vital Signs:  /86  Pulse 76  Temp 96.8  F (36  C) (Oral)  Resp 16  Ht 1.93 m (6' 4\")  Wt 115.1 kg (253 lb 11.2 oz)  SpO2 93%  BMI 30.88 kg/m2  GENERAL: No apparent distress. Awake, alert, and fully oriented.  HEENT: Normocephalic, atraumatic. Extraocular movements intact.  CARDIOVASCULAR: Regular rate and rhythm without murmurs or rubs. No S3.  PULMONARY: Clear bilaterally.  ABDOMINAL: Soft, non-tender, non-distended. Bowel sounds normoactive. No hepatosplenomegaly.  EXTREMITIES: No cyanosis or clubbing. No edema.  NEUROLOGICAL: CN 2-12 grossly intact, no focal neurological deficits.  DERMATOLOGICAL: No rash, ulcer, ecchymoses, jaundice.  PSYCH: appropriate           Medications:      All current medications were reviewed.         Data:      All new lab and imaging data was reviewed.   Data       Recent Labs  Lab 09/08/18  0614 09/07/18  0744 09/06/18  1542   WBC 5.0 8.3 11.4*   HGB 12.1* 12.0* 13.9   HCT 36.1* 35.3* 40.8   MCV 88 88 87   * 152 180       Recent Labs  Lab 09/08/18  0614 09/07/18  0744 09/06/18  1542    137 135   POTASSIUM 3.5 3.8 3.7   CHLORIDE 105 105 102   CO2 27 23 24   ANIONGAP 6 9 9   * 248* 214*   BUN 18 16 12   CR 1.11 1.09 0.85 "   GFRESTIMATED 66 67 90   GFRESTBLACK 80 81 >90   BETTIE 8.3* 7.8* 8.1*       No results found for this or any previous visit (from the past 24 hour(s)).    Lorraine Brunson MD  Hospitalist  [facility] [address] [place] [address]

## 2018-09-10 LAB
ANION GAP SERPL CALCULATED.3IONS-SCNC: 7 MMOL/L (ref 3–14)
BUN SERPL-MCNC: 11 MG/DL (ref 7–30)
CALCIUM SERPL-MCNC: 8.6 MG/DL (ref 8.5–10.1)
CHLORIDE SERPL-SCNC: 104 MMOL/L (ref 94–109)
CO2 SERPL-SCNC: 28 MMOL/L (ref 20–32)
CREAT SERPL-MCNC: 0.94 MG/DL (ref 0.66–1.25)
ERYTHROCYTE [DISTWIDTH] IN BLOOD BY AUTOMATED COUNT: 12.5 % (ref 10–15)
GFR SERPL CREATININE-BSD FRML MDRD: 80 ML/MIN/1.7M2
GLUCOSE BLDC GLUCOMTR-MCNC: 107 MG/DL (ref 70–99)
GLUCOSE BLDC GLUCOMTR-MCNC: 108 MG/DL (ref 70–99)
GLUCOSE BLDC GLUCOMTR-MCNC: 133 MG/DL (ref 70–99)
GLUCOSE BLDC GLUCOMTR-MCNC: 146 MG/DL (ref 70–99)
GLUCOSE BLDC GLUCOMTR-MCNC: 155 MG/DL (ref 70–99)
GLUCOSE SERPL-MCNC: 126 MG/DL (ref 70–99)
HCT VFR BLD AUTO: 35.4 % (ref 40–53)
HGB BLD-MCNC: 11.9 G/DL (ref 13.3–17.7)
INTERPRETATION ECG - MUSE: NORMAL
MCH RBC QN AUTO: 29.3 PG (ref 26.5–33)
MCHC RBC AUTO-ENTMCNC: 33.6 G/DL (ref 31.5–36.5)
MCV RBC AUTO: 87 FL (ref 78–100)
PLATELET # BLD AUTO: 186 10E9/L (ref 150–450)
POTASSIUM SERPL-SCNC: 3.4 MMOL/L (ref 3.4–5.3)
RBC # BLD AUTO: 4.06 10E12/L (ref 4.4–5.9)
SODIUM SERPL-SCNC: 139 MMOL/L (ref 133–144)
WBC # BLD AUTO: 5.1 10E9/L (ref 4–11)

## 2018-09-10 PROCEDURE — 25000132 ZZH RX MED GY IP 250 OP 250 PS 637: Mod: GY | Performed by: INTERNAL MEDICINE

## 2018-09-10 PROCEDURE — 25000132 ZZH RX MED GY IP 250 OP 250 PS 637: Mod: GY | Performed by: HOSPITALIST

## 2018-09-10 PROCEDURE — A9270 NON-COVERED ITEM OR SERVICE: HCPCS | Mod: GY | Performed by: INTERNAL MEDICINE

## 2018-09-10 PROCEDURE — 25000128 H RX IP 250 OP 636: Performed by: INTERNAL MEDICINE

## 2018-09-10 PROCEDURE — A9270 NON-COVERED ITEM OR SERVICE: HCPCS | Mod: GY | Performed by: HOSPITALIST

## 2018-09-10 PROCEDURE — 80048 BASIC METABOLIC PNL TOTAL CA: CPT | Performed by: HOSPITALIST

## 2018-09-10 PROCEDURE — 12000007 ZZH R&B INTERMEDIATE

## 2018-09-10 PROCEDURE — G0463 HOSPITAL OUTPT CLINIC VISIT: HCPCS

## 2018-09-10 PROCEDURE — 00000146 ZZHCL STATISTIC GLUCOSE BY METER IP

## 2018-09-10 PROCEDURE — 36415 COLL VENOUS BLD VENIPUNCTURE: CPT | Performed by: HOSPITALIST

## 2018-09-10 PROCEDURE — 99207 ZZC CDG-MDM COMPONENT: MEETS LOW - DOWN CODED: CPT | Performed by: INTERNAL MEDICINE

## 2018-09-10 PROCEDURE — 85027 COMPLETE CBC AUTOMATED: CPT | Performed by: HOSPITALIST

## 2018-09-10 PROCEDURE — 99232 SBSQ HOSP IP/OBS MODERATE 35: CPT | Performed by: INTERNAL MEDICINE

## 2018-09-10 RX ORDER — AMOXICILLIN 250 MG
1 CAPSULE ORAL AT BEDTIME
Status: DISCONTINUED | OUTPATIENT
Start: 2018-09-10 | End: 2018-09-11 | Stop reason: DRUGHIGH

## 2018-09-10 RX ORDER — SENNOSIDES 8.6 MG
1-2 TABLET ORAL 2 TIMES DAILY PRN
Status: DISCONTINUED | OUTPATIENT
Start: 2018-09-10 | End: 2018-09-17 | Stop reason: HOSPADM

## 2018-09-10 RX ORDER — SENNOSIDES 8.6 MG
1-2 TABLET ORAL 2 TIMES DAILY PRN
Status: DISCONTINUED | OUTPATIENT
Start: 2018-09-10 | End: 2018-09-10

## 2018-09-10 RX ADMIN — TAZOBACTAM SODIUM AND PIPERACILLIN SODIUM 3.38 G: 375; 3 INJECTION, SOLUTION INTRAVENOUS at 18:09

## 2018-09-10 RX ADMIN — LEVOTHYROXINE SODIUM 137 MCG: 25 TABLET ORAL at 22:05

## 2018-09-10 RX ADMIN — Medication 5 MG: at 22:05

## 2018-09-10 RX ADMIN — TRAMADOL HYDROCHLORIDE 50 MG: 50 TABLET, COATED ORAL at 15:52

## 2018-09-10 RX ADMIN — SIMVASTATIN 40 MG: 40 TABLET, FILM COATED ORAL at 22:05

## 2018-09-10 RX ADMIN — Medication 12.5 MG: at 22:06

## 2018-09-10 RX ADMIN — PANTOPRAZOLE SODIUM 40 MG: 40 TABLET, DELAYED RELEASE ORAL at 08:19

## 2018-09-10 RX ADMIN — TAZOBACTAM SODIUM AND PIPERACILLIN SODIUM 3.38 G: 375; 3 INJECTION, SOLUTION INTRAVENOUS at 13:05

## 2018-09-10 RX ADMIN — LISINOPRIL 20 MG: 20 TABLET ORAL at 20:42

## 2018-09-10 RX ADMIN — LISINOPRIL 20 MG: 20 TABLET ORAL at 08:19

## 2018-09-10 RX ADMIN — ISOSORBIDE MONONITRATE 30 MG: 30 TABLET, EXTENDED RELEASE ORAL at 08:19

## 2018-09-10 RX ADMIN — METOPROLOL SUCCINATE 100 MG: 100 TABLET, EXTENDED RELEASE ORAL at 22:06

## 2018-09-10 RX ADMIN — AMLODIPINE BESYLATE 5 MG: 5 TABLET ORAL at 08:19

## 2018-09-10 RX ADMIN — TAZOBACTAM SODIUM AND PIPERACILLIN SODIUM 3.38 G: 375; 3 INJECTION, SOLUTION INTRAVENOUS at 06:25

## 2018-09-10 RX ADMIN — SENNOSIDES 2 TABLET: 8.6 TABLET, FILM COATED ORAL at 15:57

## 2018-09-10 RX ADMIN — TRAMADOL HYDROCHLORIDE 50 MG: 50 TABLET, COATED ORAL at 08:45

## 2018-09-10 ASSESSMENT — PAIN DESCRIPTION - DESCRIPTORS
DESCRIPTORS: SHOOTING
DESCRIPTORS: SHOOTING

## 2018-09-10 ASSESSMENT — ACTIVITIES OF DAILY LIVING (ADL)
ADLS_ACUITY_SCORE: 7

## 2018-09-10 NOTE — PROVIDER NOTIFICATION
Paged MD: Pt has history of controlled a fib. Was given tele strip around 8:45pm (occurred at 1720): Pause <2.1 and 25 mm/s sweep speed.

## 2018-09-10 NOTE — PROGRESS NOTES
[facility]    Hospitalist Progress Note    Date of Service: 09/10/2018    Interval History   Feels his pain control overall is improved.  No chest pain, dyspnea, nausea, vomiting or diarrhea.  Last bowel movement a few days ago    Assessment & Plan   This is a pleasant 68-year-old gentleman with a past medical history of A. 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    He is noted to have a left diabetic foot ulcer as well as a right third toe cellulitis.  He believes he had upcoming amputation plans but the patient seemed confused about these.  He was admitted to medicine with orthopedics consultation.  He underwent MRI of his left and right feet and was found to have osteomyelitis of his right third toe.  Blood cultures are growing strep agalactiae and so he is being kept in the hospital pending surgical management of his infectious source.    Strep agalactiae bacteremia  Right third toe cellulitis and osteomyelitis  -TTE negative  -Nontoxic  -Orthopedics consulted and has plans for upcoming amputation  -Appreciate orthopedics and ID consultation  -Continue current antibiotics for now    Chronic Systolic CHF  -Euvolemic  -EF 35% by echo this visit, stable  -Continue ACE/BB    CAD status post PCI  -Most recently stented June 2017  -On Eliquis/Plavix, held for OR, resume ASAP post-op  -BP control, statin    Type 2 diabetes with neuropathy on insulin  -Appreciate CDE input  -Change to Toujeo 35 units every morning plus NovoLog 10 TIDAC  -Correctional insulin    Atrial fibrillation  History of CVA  -Holding Eliquis for surgery  -Continue his home metoprolol  -Did have a 2.2 second pause on telemetry, continue to monitor, may need beta-blocker reduction if ongoing  issue    Hypothyroidism  -Synthroid    GERD  -Protonix      Discussed with: Patient's RN  # Pain Assessment:  Current Pain Score 9/10/2018   Patient currently in pain? yes   Pain score (0-10) 4   Pain location Foot   Pain descriptors Shooting   - Jayro is experiencing pain due to osteomyelitis. Pain management was discussed and the plan was created in a collaborative fashion.  Jayro's response to the current recommendations: engaged  - Please see the plan for pain management as documented above         DVT Prophylaxis: Eliquis  Code Status: Full Code    Disposition: Expected discharge pending surgical progress    Herb Zurita III, MD  [phone] (Cell)  [phone] (Pager)  Please call or text with any questions or concerns.        Physical Exam   Temp: 97.7  F (36.5  C) Temp src: Oral BP: (!) 156/97   Heart Rate: 71 Resp: 16 SpO2: 94 % O2 Device: None (Room air)    Vitals:    09/06/18 1603 09/06/18 2053   Weight: 113.4 kg (250 lb) 115.1 kg (253 lb 11.2 oz)     Vital Signs with Ranges  Temp:  [95.7  F (35.4  C)-97.7  F (36.5  C)] 97.7  F (36.5  C)  Heart Rate:  [71-79] 71  Resp:  [16] 16  BP: (146-164)/(90-97) 156/97  SpO2:  [93 %-94 %] 94 %  I/O last 3 completed shifts:  In: 1145 [P.O.:1140; I.V.:5]  Out: -     Constitutional: Awake, alert, cooperative, no apparent distress  Respiratory: Clear to auscultation bilaterally, no crackles or wheezing  Cardiovascular: S1S2, well-perfused, no edema  GI: Normal bowel sounds, soft, non-distended, non-tender  Skin/Integumentary: No rashes or other lesions noted  Other:     Medications     - MEDICATION INSTRUCTIONS -         amLODIPine  5 mg Oral Daily     doxylamine  12.5 mg Oral At Bedtime     insulin aspart  10 Units Subcutaneous TID w/meals     insulin aspart  1-10 Units Subcutaneous TID AC     insulin aspart  1-7 Units Subcutaneous At Bedtime     [START ON 9/11/2018] insulin glargine U-300  35 Units Subcutaneous QAM     isosorbide mononitrate  30 mg Oral Daily      levothyroxine  137 mcg Oral At Bedtime     lidocaine viscous 2% 15 mL and maalox/mylanta w/ simethicone 15 mL (GI COCKTAIL)  30 mL Oral Once     lisinopril  20 mg Oral BID     melatonin  5 mg Oral At Bedtime     metoprolol succinate  100 mg Oral At Bedtime     pantoprazole  40 mg Oral QAM AC     piperacillin-tazobactam  3.375 g Intravenous Q6H     senna-docusate  1 tablet Oral At Bedtime     simvastatin  40 mg Oral At Bedtime     sodium chloride (PF)  10 mL Intracatheter Q8H       Data     Recent Labs  Lab 09/10/18  0653 09/09/18  0542 09/08/18  0614 09/08/18  0002 09/07/18  0744 09/06/18  1542   WBC 5.1 4.3 5.0  --  8.3 11.4*   HGB 11.9* 11.4* 12.1*  --  12.0* 13.9   MCV 87 86 88  --  88 87    156 148*  --  152 180   INR  --   --   --   --   --  1.36*    138 138  --  137 135   POTASSIUM 3.4 3.4 3.5  --  3.8 3.7   CHLORIDE 104 104 105  --  105 102   CO2 28 28 27  --  23 24   BUN 11 12 18  --  16 12   CR 0.94 0.95 1.11  --  1.09 0.85   ANIONGAP 7 6 6  --  9 9   BETTIE 8.6 8.2* 8.3*  --  7.8* 8.1*   * 126* 158*  --  248* 214*   ALBUMIN  --   --   --   --  3.0* 3.5   PROTTOTAL  --   --   --   --  5.8* 6.4*   BILITOTAL  --   --   --   --  1.9* 2.3*   ALKPHOS  --   --   --   --  64 79   ALT  --   --   --   --  27 23   AST  --   --   --   --  14 12   TROPI  --   --  <0.015 <0.015  --   --        Imaging:  No results found for this or any previous visit (from the past 24 hour(s)).    -Data reviewed today: I reviewed all new labs and imaging results over the last 24 hours. I personally reviewed no images or EKG's today.

## 2018-09-10 NOTE — PROGRESS NOTES
"Focus: wound  D: per MD note: 68-year-old gentleman with a past medical history of A. 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   Right 3rd plantar tip ulcer is dry and calloused  Left planter 1st Met head is red dry scab/ bloody callous  I: assessment, discussion with RN, pt and Ortho PA  A/P: poorly managed DFU by pt who states he has \"no idea\" how this could have gone on for so long. Pt has planned amputation of right toe and I&D of left foot ulcer tomorrow by Ortho MD.   Pt will need extensive post op education on care of wounds, his diabetes management, pressure reduction for left foot with use of Orthotics and start daily Betadine to clean and prepare the skin for surgery tomorrow.     WOC will sign off.   "

## 2018-09-10 NOTE — PLAN OF CARE
Problem: Patient Care Overview  Goal: Plan of Care/Patient Progress Review  Outcome: No Change  Ambulatory Status:  Pt up independent to restroom.  VS:  Vital signs:  Temp: 97.5  F (36.4  C) Temp src: Oral BP: (!) 158/93   Heart Rate: 79 Resp: 16 SpO2: 93 % O2 Device: None (Room air)  BP high at times 147/90 at start of shift, 164/92, and 158/93  Pain:  In feet, PO tramadol given x1  Resp: LS clear  GI:  no nausea.  fair appetite and on mod carb diet.  BS active.  Passing flatus.  Last BM few days ago.  :  Voiding well  Skin:  Dressings on feet CDI  Tx:  IV zosyn  Labs:   and 205  Consults:  Ortho and ID  Disposition:  TBD

## 2018-09-10 NOTE — PLAN OF CARE
Problem: Patient Care Overview  Goal: Plan of Care/Patient Progress Review  Ambulatory Status:  Pt up independently  VS:  vss  Pain:  Tramadol for pain  Resp: LS clear  GI:  denies nausea.  regular diet.  BS active.  Passing flatus.  Last BM 9/7.  Skin:  Bilat foot wounds  Tx:  zosyn  Labs:    Consults:  Ortho,   Disposition:  tbd  Tele:  afib

## 2018-09-10 NOTE — PROGRESS NOTES
S:  Patient is known to orthopedic service, he has previously been seen for evaluation of a left foot ulcer.  He had been scheduled for an I & D later this fall, as his wound was not grossly infected at the time of his office visit.  He presented to the ED last week due to confusion and was found to be septic.  The source was felt to be a right foot 3rd toe wound.  Patient has been on IV antibiotics.  His surgery date has been moved up to tomorrow.  He is scheduled to undergo a left foot I & D 1st MTP foot ulcer and an amputation of the right 3rd toe distal phalanx.  He reports he is doing better today and has felt better on the antibiotics.    O:  Patient is lying in bed with no elevation in his feet.  His left foot has a large black eschar at the 1st MTP joint, there is no significant erythema, warmth or drainage.  His right 3rd toe has a wound at the distal tip, it is mildly red and swollen.  No dressing is in place on either foot at this time.    A/P:  68 year old male who is scheduled for a left foot 1st MTP ulcer I & D and right foot 3rd toe distal phalanx amputation  NPO after midnight tonight  Patient's antibiotics are being managed by the ID team  Patient has other comorbidities, including diabetes, atrial fibrillation, coronary artery disease hypothyroidism and GERD, which are being managed by the hospitalist team  Patient was advised of the surgical procedure, it is possible the left foot procedure may be more involved, from the wound perspective, depending on the size of the wound once it is debrided    Flavio Baxter PA-C

## 2018-09-10 NOTE — PROGRESS NOTES
[facility]    Infectious Disease Progress Note    Date of Service (when I saw the patient): 09/10/2018     Assessment & Plan   Jayro Elder is a 68 year old male who was admitted on 9/6/2018.     INFECTIOUS IMPRESSION:   1.  A 68-year-old diabetic male with acute nausea, low-grade fever and probable right third toe and foot infection.  Cultures from foot, MSSA and Group B strep, blood cultures Group B strep.    2.  Chronic left first metatarsal ulcer, apparent deep infection with plans for probable amputation as an outpatient, not clear major deep infection or cellulitis by exam currently, but a slight redness present.   3.  Chronic right foot ulcer, now worsening.   4.  Diabetes mellitus with neuropathy.   5.  Prior cerebrovascular infarction.   6.  Coronary artery disease.   7.  History of atrial fibrillation.       RECOMMENDATIONS:   1.  continue on zosyn.   2. Will follow up OR notes and cultures     Lexie Shelton MD    Interval History   Improved   Afebrile   Plans for surgery     Physical Exam   Temp: 96.4  F (35.8  C) Temp src: Oral BP: (!) 162/99   Heart Rate: 70 Resp: 16 SpO2: 96 % O2 Device: None (Room air)    Vitals:    09/06/18 1603 09/06/18 2053   Weight: 113.4 kg (250 lb) 115.1 kg (253 lb 11.2 oz)     Vital Signs with Ranges  Temp:  [95.7  F (35.4  C)-97.7  F (36.5  C)] 96.4  F (35.8  C)  Heart Rate:  [70-79] 70  Resp:  [16] 16  BP: (146-164)/(92-99) 162/99  SpO2:  [94 %-96 %] 96 %    Constitutional: Awake, alert, cooperative, no apparent distress  Lungs: Clear to auscultation bilaterally, no crackles or wheezing  Cardiovascular: Regular rate and rhythm, normal S1 and S2, and no murmur noted  Abdomen: Normal bowel sounds, soft, non-distended, non-tender  Skin: No rashes, no cyanosis, no edema  Other:    Medications     - MEDICATION INSTRUCTIONS -         amLODIPine  5 mg Oral Daily     doxylamine  12.5 mg Oral At Bedtime     insulin aspart  10 Units Subcutaneous TID w/meals     insulin  aspart  1-10 Units Subcutaneous TID AC     insulin aspart  1-7 Units Subcutaneous At Bedtime     [START ON 9/11/2018] insulin glargine U-300  35 Units Subcutaneous QAM     isosorbide mononitrate  30 mg Oral Daily     levothyroxine  137 mcg Oral At Bedtime     lidocaine viscous 2% 15 mL and maalox/mylanta w/ simethicone 15 mL (GI COCKTAIL)  30 mL Oral Once     lisinopril  20 mg Oral BID     melatonin  5 mg Oral At Bedtime     metoprolol succinate  100 mg Oral At Bedtime     pantoprazole  40 mg Oral QAM AC     piperacillin-tazobactam  3.375 g Intravenous Q6H     senna-docusate  1 tablet Oral At Bedtime     simvastatin  40 mg Oral At Bedtime     sodium chloride (PF)  10 mL Intracatheter Q8H       Data   All microbiology laboratory data reviewed.  Recent Labs   Lab Test  09/10/18   0653  09/09/18   0542  09/08/18   0614   WBC  5.1  4.3  5.0   HGB  11.9*  11.4*  12.1*   HCT  35.4*  33.6*  36.1*   MCV  87  86  88   PLT  186  156  148*     Recent Labs   Lab Test  09/10/18   0653  09/09/18   0542  09/08/18   0614   CR  0.94  0.95  1.11     Recent Labs   Lab Test  09/08/18   0614   SED  15     Recent Labs   Lab Test  09/09/18   1533  09/08/18   0613  09/06/18   1550  09/06/18   1545  09/06/18   1541  03/03/18   1936  03/03/18   1843   CULT  No growth after 18 hours  No growth after 2 days  Cultured on the 1st day of incubation:  Streptococcus agalactiae sero group B  *  Critical Value/Significant Value, preliminary result only, called to and read back by  Teresa John RN on RHMS5 @2690 on 9/7/18. .    (Note)  POSITIVE for STREPTOCOCCUS AGALACTIAE (Group B Strep) by [employer]  multiplex nucleic acid test. Penicillin and ampicillin are drugs of  choice, nonsusceptible isolates have not been reported. Final  identification and antimicrobial susceptibility testing will be  verified by standard methods.    Specimen tested with [employer] multiplex, gram-positive blood culture  nucleic acid test for the following targets:  Staph aureus, Staph  epidermidis, Staph lugdunensis, other Staph species, Enterococcus  faecalis, Enterococcus faecium, Streptococcus species, S. agalactiae,  S. anginosus grp., S. pneumoniae, S. pyogenes, Listeria sp., mecA  (methicillin resistance) and Elisha/B (vancomycin resistance).    Critical Value/Significant Value called to and read back by Teresa John RN on 9.7.18 at 1340. bw      Cultured on the 1st day of incubation:  Streptococcus agalactiae sero group B  Susceptibility testing done on previous specimen  *  Critical Value/Significant Value, preliminary result only, called to and read back by  Ofe Fernandes RN on RHMS5 @2374 on 9/7/18. ch.    Heavy growth  Staphylococcus aureus  *  Heavy growth  Beta hemolytic Streptococcus group B  *  Moderate growth  Normal skin michael    No growth  No growth  No growth

## 2018-09-10 NOTE — PLAN OF CARE
Problem: Patient Care Overview  Goal: Plan of Care/Patient Progress Review  Outcome: No Change  Pt remains admitted for diabetic foot ulcer  A/Ox4  Neuropathic pain reported, tramadol given x1 with relief  No nausea reported  Dressings on bilat feet changed per plan of care, clean dry and intact  On tele, 2.2 second pause noted during shift, running afib  On IV zosyn   Scheduled for bilat surgery tomorrow  Up independently   Mod cho diet  Discharge pending  Will continue to monitor

## 2018-09-11 ENCOUNTER — ANESTHESIA EVENT (OUTPATIENT)
Dept: SURGERY | Facility: CLINIC | Age: 68
DRG: 617 | End: 2018-09-11
Payer: MEDICARE

## 2018-09-11 ENCOUNTER — ANESTHESIA (OUTPATIENT)
Dept: SURGERY | Facility: CLINIC | Age: 68
DRG: 617 | End: 2018-09-11
Payer: MEDICARE

## 2018-09-11 LAB
GLUCOSE BLDC GLUCOMTR-MCNC: 142 MG/DL (ref 70–99)
GLUCOSE BLDC GLUCOMTR-MCNC: 142 MG/DL (ref 70–99)
GLUCOSE BLDC GLUCOMTR-MCNC: 178 MG/DL (ref 70–99)
GLUCOSE BLDC GLUCOMTR-MCNC: 84 MG/DL (ref 70–99)
GLUCOSE BLDC GLUCOMTR-MCNC: 87 MG/DL (ref 70–99)
GLUCOSE BLDC GLUCOMTR-MCNC: 92 MG/DL (ref 70–99)

## 2018-09-11 PROCEDURE — 40000306 ZZH STATISTIC PRE PROC ASSESS II: Performed by: ORTHOPAEDIC SURGERY

## 2018-09-11 PROCEDURE — 37000008 ZZH ANESTHESIA TECHNICAL FEE, 1ST 30 MIN: Performed by: ORTHOPAEDIC SURGERY

## 2018-09-11 PROCEDURE — 36000050 ZZH SURGERY LEVEL 2 1ST 30 MIN: Performed by: ORTHOPAEDIC SURGERY

## 2018-09-11 PROCEDURE — 25000132 ZZH RX MED GY IP 250 OP 250 PS 637: Mod: GY | Performed by: ORTHOPAEDIC SURGERY

## 2018-09-11 PROCEDURE — 88311 DECALCIFY TISSUE: CPT | Mod: 26 | Performed by: ORTHOPAEDIC SURGERY

## 2018-09-11 PROCEDURE — 99232 SBSQ HOSP IP/OBS MODERATE 35: CPT | Performed by: INTERNAL MEDICINE

## 2018-09-11 PROCEDURE — 25000128 H RX IP 250 OP 636: Performed by: ANESTHESIOLOGY

## 2018-09-11 PROCEDURE — 25000128 H RX IP 250 OP 636: Performed by: ORTHOPAEDIC SURGERY

## 2018-09-11 PROCEDURE — 37000009 ZZH ANESTHESIA TECHNICAL FEE, EACH ADDTL 15 MIN: Performed by: ORTHOPAEDIC SURGERY

## 2018-09-11 PROCEDURE — 25000125 ZZHC RX 250: Performed by: ANESTHESIOLOGY

## 2018-09-11 PROCEDURE — 71000012 ZZH RECOVERY PHASE 1 LEVEL 1 FIRST HR: Performed by: ORTHOPAEDIC SURGERY

## 2018-09-11 PROCEDURE — 25000132 ZZH RX MED GY IP 250 OP 250 PS 637: Mod: GY | Performed by: INTERNAL MEDICINE

## 2018-09-11 PROCEDURE — 25000128 H RX IP 250 OP 636: Performed by: NURSE ANESTHETIST, CERTIFIED REGISTERED

## 2018-09-11 PROCEDURE — 88305 TISSUE EXAM BY PATHOLOGIST: CPT | Mod: 26 | Performed by: ORTHOPAEDIC SURGERY

## 2018-09-11 PROCEDURE — 25000128 H RX IP 250 OP 636: Performed by: INTERNAL MEDICINE

## 2018-09-11 PROCEDURE — A9270 NON-COVERED ITEM OR SERVICE: HCPCS | Mod: GY | Performed by: ORTHOPAEDIC SURGERY

## 2018-09-11 PROCEDURE — 88311 DECALCIFY TISSUE: CPT | Performed by: ORTHOPAEDIC SURGERY

## 2018-09-11 PROCEDURE — 12000007 ZZH R&B INTERMEDIATE

## 2018-09-11 PROCEDURE — 25000125 ZZHC RX 250: Performed by: ORTHOPAEDIC SURGERY

## 2018-09-11 PROCEDURE — 27210794 ZZH OR GENERAL SUPPLY STERILE: Performed by: ORTHOPAEDIC SURGERY

## 2018-09-11 PROCEDURE — 36000052 ZZH SURGERY LEVEL 2 EA 15 ADDTL MIN: Performed by: ORTHOPAEDIC SURGERY

## 2018-09-11 PROCEDURE — A9270 NON-COVERED ITEM OR SERVICE: HCPCS | Mod: GY | Performed by: INTERNAL MEDICINE

## 2018-09-11 PROCEDURE — 71000027 ZZH RECOVERY PHASE 2 EACH 15 MINS: Performed by: ORTHOPAEDIC SURGERY

## 2018-09-11 PROCEDURE — 00000146 ZZHCL STATISTIC GLUCOSE BY METER IP

## 2018-09-11 PROCEDURE — 88305 TISSUE EXAM BY PATHOLOGIST: CPT | Performed by: ORTHOPAEDIC SURGERY

## 2018-09-11 PROCEDURE — 25000132 ZZH RX MED GY IP 250 OP 250 PS 637: Mod: GY | Performed by: HOSPITALIST

## 2018-09-11 PROCEDURE — A9270 NON-COVERED ITEM OR SERVICE: HCPCS | Mod: GY | Performed by: HOSPITALIST

## 2018-09-11 RX ORDER — NALOXONE HYDROCHLORIDE 0.4 MG/ML
.1-.4 INJECTION, SOLUTION INTRAMUSCULAR; INTRAVENOUS; SUBCUTANEOUS
Status: ACTIVE | OUTPATIENT
Start: 2018-09-11 | End: 2018-09-12

## 2018-09-11 RX ORDER — AMOXICILLIN 250 MG
2 CAPSULE ORAL 2 TIMES DAILY
Status: DISCONTINUED | OUTPATIENT
Start: 2018-09-11 | End: 2018-09-17 | Stop reason: HOSPADM

## 2018-09-11 RX ORDER — ONDANSETRON 4 MG/1
4 TABLET, ORALLY DISINTEGRATING ORAL EVERY 6 HOURS PRN
Status: DISCONTINUED | OUTPATIENT
Start: 2018-09-11 | End: 2018-09-11

## 2018-09-11 RX ORDER — MAGNESIUM HYDROXIDE 1200 MG/15ML
LIQUID ORAL PRN
Status: DISCONTINUED | OUTPATIENT
Start: 2018-09-11 | End: 2018-09-17 | Stop reason: HOSPADM

## 2018-09-11 RX ORDER — PROCHLORPERAZINE MALEATE 5 MG
5 TABLET ORAL EVERY 6 HOURS PRN
Status: DISCONTINUED | OUTPATIENT
Start: 2018-09-11 | End: 2018-09-17 | Stop reason: HOSPADM

## 2018-09-11 RX ORDER — SODIUM CHLORIDE, SODIUM LACTATE, POTASSIUM CHLORIDE, CALCIUM CHLORIDE 600; 310; 30; 20 MG/100ML; MG/100ML; MG/100ML; MG/100ML
INJECTION, SOLUTION INTRAVENOUS CONTINUOUS
Status: DISCONTINUED | OUTPATIENT
Start: 2018-09-11 | End: 2018-09-11 | Stop reason: HOSPADM

## 2018-09-11 RX ORDER — FENTANYL CITRATE 50 UG/ML
25-50 INJECTION, SOLUTION INTRAMUSCULAR; INTRAVENOUS
Status: DISCONTINUED | OUTPATIENT
Start: 2018-09-11 | End: 2018-09-11 | Stop reason: HOSPADM

## 2018-09-11 RX ORDER — LABETALOL HYDROCHLORIDE 5 MG/ML
10 INJECTION, SOLUTION INTRAVENOUS
Status: DISCONTINUED | OUTPATIENT
Start: 2018-09-11 | End: 2018-09-11 | Stop reason: HOSPADM

## 2018-09-11 RX ORDER — METOCLOPRAMIDE 5 MG/1
5 TABLET ORAL EVERY 6 HOURS PRN
Status: DISCONTINUED | OUTPATIENT
Start: 2018-09-11 | End: 2018-09-17 | Stop reason: HOSPADM

## 2018-09-11 RX ORDER — CEFAZOLIN SODIUM 2 G/100ML
2 INJECTION, SOLUTION INTRAVENOUS
Status: DISCONTINUED | OUTPATIENT
Start: 2018-09-11 | End: 2018-09-11

## 2018-09-11 RX ORDER — ONDANSETRON 4 MG/1
4 TABLET, ORALLY DISINTEGRATING ORAL EVERY 30 MIN PRN
Status: DISCONTINUED | OUTPATIENT
Start: 2018-09-11 | End: 2018-09-11 | Stop reason: HOSPADM

## 2018-09-11 RX ORDER — LIDOCAINE 40 MG/G
CREAM TOPICAL
Status: DISCONTINUED | OUTPATIENT
Start: 2018-09-11 | End: 2018-09-11 | Stop reason: HOSPADM

## 2018-09-11 RX ORDER — FENTANYL CITRATE 50 UG/ML
INJECTION, SOLUTION INTRAMUSCULAR; INTRAVENOUS PRN
Status: DISCONTINUED | OUTPATIENT
Start: 2018-09-11 | End: 2018-09-11

## 2018-09-11 RX ORDER — HYDRALAZINE HYDROCHLORIDE 20 MG/ML
2.5-5 INJECTION INTRAMUSCULAR; INTRAVENOUS EVERY 10 MIN PRN
Status: DISCONTINUED | OUTPATIENT
Start: 2018-09-11 | End: 2018-09-11 | Stop reason: HOSPADM

## 2018-09-11 RX ORDER — TRAMADOL HYDROCHLORIDE 50 MG/1
50 TABLET ORAL EVERY 6 HOURS PRN
Status: DISCONTINUED | OUTPATIENT
Start: 2018-09-11 | End: 2018-09-17 | Stop reason: HOSPADM

## 2018-09-11 RX ORDER — ONDANSETRON 2 MG/ML
4 INJECTION INTRAMUSCULAR; INTRAVENOUS EVERY 30 MIN PRN
Status: DISCONTINUED | OUTPATIENT
Start: 2018-09-11 | End: 2018-09-11 | Stop reason: HOSPADM

## 2018-09-11 RX ORDER — LIDOCAINE HYDROCHLORIDE 20 MG/ML
INJECTION, SOLUTION INFILTRATION; PERINEURAL PRN
Status: DISCONTINUED | OUTPATIENT
Start: 2018-09-11 | End: 2018-09-11

## 2018-09-11 RX ORDER — AMOXICILLIN 250 MG
1 CAPSULE ORAL 2 TIMES DAILY
Status: DISCONTINUED | OUTPATIENT
Start: 2018-09-11 | End: 2018-09-17 | Stop reason: HOSPADM

## 2018-09-11 RX ORDER — LIDOCAINE 40 MG/G
CREAM TOPICAL
Status: DISCONTINUED | OUTPATIENT
Start: 2018-09-11 | End: 2018-09-12

## 2018-09-11 RX ORDER — METOCLOPRAMIDE HYDROCHLORIDE 5 MG/ML
5 INJECTION INTRAMUSCULAR; INTRAVENOUS EVERY 6 HOURS PRN
Status: DISCONTINUED | OUTPATIENT
Start: 2018-09-11 | End: 2018-09-17 | Stop reason: HOSPADM

## 2018-09-11 RX ORDER — CEFAZOLIN SODIUM 1 G/3ML
1 INJECTION, POWDER, FOR SOLUTION INTRAMUSCULAR; INTRAVENOUS SEE ADMIN INSTRUCTIONS
Status: DISCONTINUED | OUTPATIENT
Start: 2018-09-11 | End: 2018-09-11

## 2018-09-11 RX ORDER — ACETAMINOPHEN 325 MG/1
650 TABLET ORAL EVERY 4 HOURS PRN
Status: DISCONTINUED | OUTPATIENT
Start: 2018-09-14 | End: 2018-09-17 | Stop reason: HOSPADM

## 2018-09-11 RX ORDER — KETOROLAC TROMETHAMINE 15 MG/ML
15 INJECTION, SOLUTION INTRAMUSCULAR; INTRAVENOUS EVERY 6 HOURS
Status: DISCONTINUED | OUTPATIENT
Start: 2018-09-11 | End: 2018-09-12

## 2018-09-11 RX ORDER — SODIUM CHLORIDE, SODIUM LACTATE, POTASSIUM CHLORIDE, CALCIUM CHLORIDE 600; 310; 30; 20 MG/100ML; MG/100ML; MG/100ML; MG/100ML
INJECTION, SOLUTION INTRAVENOUS CONTINUOUS
Status: DISCONTINUED | OUTPATIENT
Start: 2018-09-11 | End: 2018-09-16

## 2018-09-11 RX ORDER — NALOXONE HYDROCHLORIDE 0.4 MG/ML
.1-.4 INJECTION, SOLUTION INTRAMUSCULAR; INTRAVENOUS; SUBCUTANEOUS
Status: DISCONTINUED | OUTPATIENT
Start: 2018-09-11 | End: 2018-09-17 | Stop reason: HOSPADM

## 2018-09-11 RX ORDER — SODIUM CHLORIDE, SODIUM LACTATE, POTASSIUM CHLORIDE, CALCIUM CHLORIDE 600; 310; 30; 20 MG/100ML; MG/100ML; MG/100ML; MG/100ML
INJECTION, SOLUTION INTRAVENOUS CONTINUOUS PRN
Status: DISCONTINUED | OUTPATIENT
Start: 2018-09-11 | End: 2018-09-11

## 2018-09-11 RX ORDER — ACETAMINOPHEN 325 MG/1
975 TABLET ORAL EVERY 8 HOURS
Status: COMPLETED | OUTPATIENT
Start: 2018-09-11 | End: 2018-09-14

## 2018-09-11 RX ORDER — GABAPENTIN 300 MG/1
300 CAPSULE ORAL 3 TIMES DAILY
Status: COMPLETED | OUTPATIENT
Start: 2018-09-11 | End: 2018-09-14

## 2018-09-11 RX ORDER — ONDANSETRON 2 MG/ML
4 INJECTION INTRAMUSCULAR; INTRAVENOUS EVERY 6 HOURS PRN
Status: DISCONTINUED | OUTPATIENT
Start: 2018-09-11 | End: 2018-09-11

## 2018-09-11 RX ORDER — BUPIVACAINE HYDROCHLORIDE 5 MG/ML
INJECTION, SOLUTION EPIDURAL; INTRACAUDAL PRN
Status: DISCONTINUED | OUTPATIENT
Start: 2018-09-11 | End: 2018-09-11

## 2018-09-11 RX ORDER — HYDROMORPHONE HYDROCHLORIDE 1 MG/ML
.3-.5 INJECTION, SOLUTION INTRAMUSCULAR; INTRAVENOUS; SUBCUTANEOUS EVERY 5 MIN PRN
Status: DISCONTINUED | OUTPATIENT
Start: 2018-09-11 | End: 2018-09-11 | Stop reason: HOSPADM

## 2018-09-11 RX ADMIN — TAZOBACTAM SODIUM AND PIPERACILLIN SODIUM 3.38 G: 375; 3 INJECTION, SOLUTION INTRAVENOUS at 05:47

## 2018-09-11 RX ADMIN — FENTANYL CITRATE 100 MCG: 50 INJECTION, SOLUTION INTRAMUSCULAR; INTRAVENOUS at 08:15

## 2018-09-11 RX ADMIN — ACETAMINOPHEN 975 MG: 325 TABLET, FILM COATED ORAL at 21:47

## 2018-09-11 RX ADMIN — Medication 5 MG: at 21:47

## 2018-09-11 RX ADMIN — TAZOBACTAM SODIUM AND PIPERACILLIN SODIUM 3.38 G: 375; 3 INJECTION, SOLUTION INTRAVENOUS at 12:19

## 2018-09-11 RX ADMIN — GABAPENTIN 300 MG: 300 CAPSULE ORAL at 16:00

## 2018-09-11 RX ADMIN — HYDRALAZINE HYDROCHLORIDE 5 MG: 20 INJECTION INTRAMUSCULAR; INTRAVENOUS at 10:05

## 2018-09-11 RX ADMIN — TAZOBACTAM SODIUM AND PIPERACILLIN SODIUM 3.38 G: 375; 3 INJECTION, SOLUTION INTRAVENOUS at 18:21

## 2018-09-11 RX ADMIN — KETOROLAC TROMETHAMINE 15 MG: 15 INJECTION, SOLUTION INTRAMUSCULAR; INTRAVENOUS at 12:24

## 2018-09-11 RX ADMIN — GABAPENTIN 300 MG: 300 CAPSULE ORAL at 21:47

## 2018-09-11 RX ADMIN — LIDOCAINE HYDROCHLORIDE 10 ML: 20 INJECTION, SOLUTION INFILTRATION; PERINEURAL at 08:19

## 2018-09-11 RX ADMIN — SENNOSIDES AND DOCUSATE SODIUM 1 TABLET: 8.6; 5 TABLET ORAL at 20:42

## 2018-09-11 RX ADMIN — MIDAZOLAM 1 MG: 1 INJECTION INTRAMUSCULAR; INTRAVENOUS at 08:32

## 2018-09-11 RX ADMIN — AMLODIPINE BESYLATE 5 MG: 5 TABLET ORAL at 12:38

## 2018-09-11 RX ADMIN — TAZOBACTAM SODIUM AND PIPERACILLIN SODIUM 3.38 G: 375; 3 INJECTION, SOLUTION INTRAVENOUS at 00:34

## 2018-09-11 RX ADMIN — METOPROLOL SUCCINATE 100 MG: 100 TABLET, EXTENDED RELEASE ORAL at 21:46

## 2018-09-11 RX ADMIN — ISOSORBIDE MONONITRATE 30 MG: 30 TABLET, EXTENDED RELEASE ORAL at 12:39

## 2018-09-11 RX ADMIN — HYDRALAZINE HYDROCHLORIDE 5 MG: 20 INJECTION INTRAMUSCULAR; INTRAVENOUS at 09:49

## 2018-09-11 RX ADMIN — SODIUM CHLORIDE, POTASSIUM CHLORIDE, SODIUM LACTATE AND CALCIUM CHLORIDE: 600; 310; 30; 20 INJECTION, SOLUTION INTRAVENOUS at 06:30

## 2018-09-11 RX ADMIN — FENTANYL CITRATE 50 MCG: 50 INJECTION, SOLUTION INTRAMUSCULAR; INTRAVENOUS at 08:32

## 2018-09-11 RX ADMIN — SODIUM CHLORIDE, POTASSIUM CHLORIDE, SODIUM LACTATE AND CALCIUM CHLORIDE: 600; 310; 30; 20 INJECTION, SOLUTION INTRAVENOUS at 17:03

## 2018-09-11 RX ADMIN — LISINOPRIL 20 MG: 20 TABLET ORAL at 12:38

## 2018-09-11 RX ADMIN — ACETAMINOPHEN 975 MG: 325 TABLET, FILM COATED ORAL at 13:56

## 2018-09-11 RX ADMIN — MIDAZOLAM 2 MG: 1 INJECTION INTRAMUSCULAR; INTRAVENOUS at 08:15

## 2018-09-11 RX ADMIN — LEVOTHYROXINE SODIUM 137 MCG: 25 TABLET ORAL at 21:46

## 2018-09-11 RX ADMIN — SIMVASTATIN 40 MG: 40 TABLET, FILM COATED ORAL at 21:47

## 2018-09-11 RX ADMIN — Medication: at 10:14

## 2018-09-11 RX ADMIN — LISINOPRIL 20 MG: 20 TABLET ORAL at 20:41

## 2018-09-11 RX ADMIN — BUPIVACAINE HYDROCHLORIDE 20 ML: 5 INJECTION, SOLUTION EPIDURAL; INTRACAUDAL at 08:19

## 2018-09-11 ASSESSMENT — ACTIVITIES OF DAILY LIVING (ADL)
ADLS_ACUITY_SCORE: 8
ADLS_ACUITY_SCORE: 7
ADLS_ACUITY_SCORE: 8
ADLS_ACUITY_SCORE: 8
ADLS_ACUITY_SCORE: 7

## 2018-09-11 ASSESSMENT — LIFESTYLE VARIABLES: TOBACCO_USE: 1

## 2018-09-11 ASSESSMENT — ENCOUNTER SYMPTOMS: DYSRHYTHMIAS: 1

## 2018-09-11 NOTE — PLAN OF CARE
Problem: Patient Care Overview  Goal: Plan of Care/Patient Progress Review  Outcome: No Change  Ambulatory Status:  Pt up independent to restroom.  VS:  Vital signs:  Temp: 96.4  F (35.8  C) Temp src: Oral BP: (!) 156/94   Heart Rate: 78 Resp: 16 SpO2: 96 % O2 Device: None (Room air)   Pain:  Pain noted in feet, PO tramadol given per MAR  Resp: LS clear  GI:  no nausea.  fair appetite and on mod carb diet.  BS active.  Passing flatus.  Last BM today after stool softener.  :  Voiding well  Skin:  Dressings on feet CDI  Tx:  Surgery tomorrow and NPO at 0000 and IV zosyn  Labs:   and 155  Consults:  WOC/ID/ortho   Disposition:  TBD

## 2018-09-11 NOTE — PROGRESS NOTES
[facility]    Infectious Disease Progress Note    Date of Service (when I saw the patient): 09/11/2018     Assessment & Plan   Jayro Elder is a 68 year old male who was admitted on 9/6/2018.     INFECTIOUS IMPRESSION:   1.  A 68-year-old diabetic male with acute nausea, low-grade fever and probable right third toe and foot infection.  Cultures from foot, MSSA and Group B strep, blood cultures Group B strep.    2.  Chronic left first metatarsal ulcer, Chronic right foot ulcer, now worsening. S/P I and d of the left great toe and amputation 3rd toe in the right.   4.  Diabetes mellitus with neuropathy.   5.  Prior cerebrovascular infarction.   6.  Coronary artery disease.   7.  History of atrial fibrillation.       RECOMMENDATIONS:   1. continue on zosyn., s/p I &D ulcer left great toe Amputation 3rd toe right foot at DIP level      Lexie Shelton MD    Interval History   S/p above mentioned surggery    Physical Exam   Temp: 96.4  F (35.8  C) Temp src: Oral BP: 135/87   Heart Rate: 57 Resp: 17 SpO2: 92 % O2 Device: None (Room air) Oxygen Delivery: 2 LPM  Vitals:    09/06/18 1603 09/06/18 2053   Weight: 113.4 kg (250 lb) 115.1 kg (253 lb 11.2 oz)     Vital Signs with Ranges  Temp:  [96  F (35.6  C)-98  F (36.7  C)] 96.4  F (35.8  C)  Heart Rate:  [57-78] 57  Resp:  [11-20] 17  BP: (135-166)/() 135/87  SpO2:  [92 %-99 %] 92 %    Constitutional: Awake, alert, cooperative, no apparent distress  Lungs: Clear to auscultation bilaterally, no crackles or wheezing  Cardiovascular: Regular rate and rhythm, normal S1 and S2, and no murmur noted  Abdomen: Normal bowel sounds, soft, non-distended, non-tender  Skin: No rashes, no cyanosis, no edema  Other:    Medications     - MEDICATION INSTRUCTIONS -       HYDROmorphone       lactated ringers 50 mL/hr at 09/11/18 1157       acetaminophen  975 mg Oral Q8H     amLODIPine  5 mg Oral Daily     doxylamine  12.5 mg Oral At Bedtime     gabapentin  300 mg Oral TID      insulin aspart  10 Units Subcutaneous TID w/meals     insulin aspart  1-10 Units Subcutaneous TID AC     insulin aspart  1-7 Units Subcutaneous At Bedtime     insulin glargine U-300  35 Units Subcutaneous QAM     isosorbide mononitrate  30 mg Oral Daily     ketorolac  15 mg Intravenous Q6H     levothyroxine  137 mcg Oral At Bedtime     lidocaine viscous 2% 15 mL and maalox/mylanta w/ simethicone 15 mL (GI COCKTAIL)  30 mL Oral Once     lisinopril  20 mg Oral BID     melatonin  5 mg Oral At Bedtime     metoprolol succinate  100 mg Oral At Bedtime     pantoprazole  40 mg Oral QAM AC     piperacillin-tazobactam  3.375 g Intravenous Q6H     senna-docusate  1 tablet Oral BID    Or     senna-docusate  2 tablet Oral BID     simvastatin  40 mg Oral At Bedtime     sodium chloride (PF)  10 mL Intracatheter Q8H     sodium chloride (PF)  3 mL Intracatheter Q8H       Data   All microbiology laboratory data reviewed.  Recent Labs   Lab Test  09/10/18   0653  09/09/18   0542  09/08/18   0614   WBC  5.1  4.3  5.0   HGB  11.9*  11.4*  12.1*   HCT  35.4*  33.6*  36.1*   MCV  87  86  88   PLT  186  156  148*     Recent Labs   Lab Test  09/10/18   0653  09/09/18   0542  09/08/18   0614   CR  0.94  0.95  1.11     Recent Labs   Lab Test  09/08/18   0614   SED  15     Recent Labs   Lab Test  09/09/18   1533  09/08/18   0613  09/06/18   1550  09/06/18   1545  09/06/18   1541  03/03/18   1936  03/03/18   1843   CULT  No growth after 1 day  No growth after 2 days  Cultured on the 1st day of incubation:  Streptococcus agalactiae sero group B  *  Critical Value/Significant Value, preliminary result only, called to and read back by  Teresa John RN on RHMS5 @1055 on 9/7/18. ch.    (Note)  POSITIVE for STREPTOCOCCUS AGALACTIAE (Group B Strep) by [employer]  multiplex nucleic acid test. Penicillin and ampicillin are drugs of  choice, nonsusceptible isolates have not been reported. Final  identification and antimicrobial susceptibility  testing will be  verified by standard methods.    Specimen tested with [employer] multiplex, gram-positive blood culture  nucleic acid test for the following targets: Staph aureus, Staph  epidermidis, Staph lugdunensis, other Staph species, Enterococcus  faecalis, Enterococcus faecium, Streptococcus species, S. agalactiae,  S. anginosus grp., S. pneumoniae, S. pyogenes, Listeria sp., mecA  (methicillin resistance) and Elisha/B (vancomycin resistance).    Critical Value/Significant Value called to and read back by Teresa John RN on 9.7.18 at 1340. bw      Cultured on the 1st day of incubation:  Streptococcus agalactiae sero group B  Susceptibility testing done on previous specimen  *  Critical Value/Significant Value, preliminary result only, called to and read back by  Ofe Fernandes RN on RHMS5 @7488 on 9/7/18. ch.    Heavy growth  Staphylococcus aureus  *  Heavy growth  Beta hemolytic Streptococcus group B  *  Moderate growth  Normal skin michael    No growth  No growth  No growth

## 2018-09-11 NOTE — ANESTHESIA PROCEDURE NOTES
Peripheral nerve/Neuraxial procedure note : Other (Bilateral Transmetatarsal blocks)  Pre-Procedure  Performed by YE JUAREZ  Referred by LUZ  Location: pre-op      Pre-Anesthestic Checklist: patient identified, IV checked, site marked, risks and benefits discussed, informed consent, monitors and equipment checked, pre-op evaluation, at physician/surgeon's request and post-op pain management    Timeout  Correct Patient: Yes   Correct Procedure: Yes   Correct Site: Yes   Correct Laterality: Yes   Correct Position: Yes   Site Marked: Yes   .   Procedure Documentation    .    Procedure:  left and right  Other (Bilateral Transmetatarsal blocks).       Patient Prep;mask, sterile gloves, povidone-iodine 7.5% surgical scrub.  .  Needle: other Insertion Method: Single Shot.       Assessment/Narrative  Injection made incrementally with aspirations every 5 mL..  The placement was negative for: blood aspirated, painful injection and site bleeding.  Bolus given via needle..   Secured via.   Complications: none. Test dose of mL at. Test dose negative for signs of intravascular, subdural or intrathecal injection. Comments:  The surgeon has given a verbal order transferring care of this patient to me for the performance of a regional analgesia block for post-op pain control. It is requested of me because I am uniquely trained and qualified to perform this block and the surgeon is neither trained nor qualified to perform this procedure.

## 2018-09-11 NOTE — ANESTHESIA CARE TRANSFER NOTE
Patient: Jayro Elder    Procedure(s):  Irrigation & debridement Left Foot Ulcer 1st. Metatarsophalangeal Joint;  Right amputation Distal Phalanx 3rd. toe. - Wound Class: II-Clean Contaminated    Diagnosis: great toe ulcer on left foot  Diagnosis Additional Information: No value filed.    Anesthesia Type:   MAC, Periph. Nerve Block for postop pain     Note:  Airway :Room Air  Patient transferred to:PACU  Handoff Report: Identifed the Patient, Identified the Reponsible Provider, Reviewed the pertinent medical history, Discussed the surgical course, Reviewed Intra-OP anesthesia mangement and issues during anesthesia, Set expectations for post-procedure period and Allowed opportunity for questions and acknowledgement of understanding      Vitals: (Last set prior to Anesthesia Care Transfer)    CRNA VITALS  9/11/2018 0846 - 9/11/2018 0925      9/11/2018             NIBP: (!)  157/103    Pulse: 64    NIBP Mean: 120    SpO2: 97 %                Electronically Signed By: LORRAINE Munson CRNA  September 11, 2018  9:25 AM

## 2018-09-11 NOTE — PLAN OF CARE
Problem: Patient Care Overview  Goal: Plan of Care/Patient Progress Review  Outcome: No Change    A/Ox4  No c/o pain  No c/o nausea   Dressings on bilat feet c/d/i  On tele HR 60's controlled afib  On IV zosyn   Report gave to surgery 0615  Pt. Brought down to surgery at 0630  Up independently   NPO over night  Discharge:TIFFANI

## 2018-09-11 NOTE — ANESTHESIA PREPROCEDURE EVALUATION
Anesthesia Evaluation     . Pt has had prior anesthetic.            ROS/MED HX    ENT/Pulmonary:     (+)sleep apnea, tobacco use, Past use , . .    Neurologic:     (+)neuropathy CVA     Cardiovascular:     (+) hypertension--CAD, angina-past MI,-stent,7 . : . CHF Last EF: - . fainting (syncope). :. dysrhythmias a-fib, .       METS/Exercise Tolerance:     Hematologic:         Musculoskeletal:   (+) arthritis, , , -       GI/Hepatic:         Renal/Genitourinary:         Endo:     (+) type II DM thyroid problem hypothyroidism, Obesity, .      Psychiatric:         Infectious Disease:   (+) Other Infectious Disease foot      Malignancy:   (+) Malignancy History of Other          Other:                     Physical Exam  Normal systems: pulmonary    Airway   Mallampati: II  TM distance: >3 FB  Neck ROM: full    Dental   (+) chipped, missing and loose    Cardiovascular   Rhythm and rate: irregular and abnormal      Pulmonary                     Anesthesia Plan      History & Physical Review  History and physical reviewed and following examination; no interval change.    ASA Status:  4 .    NPO Status:  > 8 hours    Plan for MAC and Periph. Nerve Block for postop pain Reason for MAC:  Chronic cardiopulmonary disease (G9)         Postoperative Care  Postoperative pain management:  IV analgesics.      Consents                          .

## 2018-09-11 NOTE — PROGRESS NOTES
[facility]    Hospitalist Progress Note    Date of Service: 09/11/2018    Interval History   Appears to have tolerated OR well today. No pain on present PCA settings. No nausea, vomiting, chest pain, dyspnea or abdominal discomfort    Assessment & Plan   This is a pleasant 68-year-old gentleman with a past medical history of A. 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    He is noted to have a left diabetic foot ulcer as well as a right third toe cellulitis.  He believes he had upcoming amputation plans but the patient seemed confused about these.  He was admitted to medicine with orthopedics consultation.  He underwent MRI of his left and right feet and was found to have osteomyelitis of his right third toe.  Blood cultures are growing strep agalactiae and so he is being kept in the hospital pending surgical management of his infectious source.    Strep agalactiae bacteremia  Right third toe cellulitis and osteomyelitis  -TTE negative  -Nontoxic  -s/p I&D of left foot ulcer and distal right 3rd phalanx amputation  -Appreciate orthopedics and ID consultation  -Continue current antibiotics for now, duration TBD    Chronic Systolic CHF  -Euvolemic  -EF 35% by echo this visit, stable  -Continue ACE/BB    CAD status post PCI  -Most recently stented June 2017  -On Eliquis/Plavix, held for OR, resume ASAP post-op  -BP control, statin    Type 2 diabetes with neuropathy on insulin  -Appreciate CDE input  -Change to Toujeo 35 units every morning plus NovoLog 10 TIDAC  -Correctional insulin    Atrial fibrillation  History of CVA  -Holding Eliquis for surgery  -Continue his home metoprolol  -Did have a 2.2 second pause on telemetry, continue to monitor, may need beta-blocker  reduction if ongoing issue    Hypothyroidism  -Synthroid    GERD  -Protonix      Discussed with: Patient's RN  # Pain Assessment:  Current Pain Score 9/11/2018   Patient currently in pain? -   Pain score (0-10) 0   Pain location -   Pain descriptors -   - Jayro is experiencing pain due to osteomyelitis. Pain management was discussed and the plan was created in a collaborative fashion.  Jayro's response to the current recommendations: engaged  - Please see the plan for pain management as documented above         DVT Prophylaxis: Eliquis  Code Status: Full Code    Disposition: Expected discharge pending surgical progress    Herb Zurita III, MD  [phone] (Cell)  [phone] (Pager)  Please call or text with any questions or concerns.        Physical Exam   Temp: 96.4  F (35.8  C) Temp src: Oral BP: (!) 166/95   Heart Rate: 61 Resp: 17 SpO2: 92 % O2 Device: None (Room air) Oxygen Delivery: 2 LPM  Vitals:    09/06/18 1603 09/06/18 2053   Weight: 113.4 kg (250 lb) 115.1 kg (253 lb 11.2 oz)     Vital Signs with Ranges  Temp:  [96  F (35.6  C)-98  F (36.7  C)] 96.4  F (35.8  C)  Heart Rate:  [61-78] 61  Resp:  [11-20] 17  BP: (148-166)/() 166/95  SpO2:  [92 %-99 %] 92 %  I/O last 3 completed shifts:  In: 126 [P.O.:120; I.V.:6]  Out: -     Constitutional: Awake, alert, cooperative, no apparent distress  Respiratory: Clear to auscultation bilaterally, no crackles or wheezing  Cardiovascular: S1S2, well-perfused, no edema  GI: Normal bowel sounds, soft, non-distended, non-tender  Skin/Integumentary: No rashes or other lesions noted  Other:     Medications     - MEDICATION INSTRUCTIONS -       HYDROmorphone       lactated ringers 50 mL/hr at 09/11/18 1157       acetaminophen  975 mg Oral Q8H     amLODIPine  5 mg Oral Daily     doxylamine  12.5 mg Oral At Bedtime     gabapentin  300 mg Oral TID     insulin aspart  10 Units Subcutaneous TID w/meals     insulin aspart  1-10 Units Subcutaneous TID AC     insulin  aspart  1-7 Units Subcutaneous At Bedtime     insulin glargine U-300  35 Units Subcutaneous QAM     isosorbide mononitrate  30 mg Oral Daily     ketorolac  15 mg Intravenous Q6H     levothyroxine  137 mcg Oral At Bedtime     lidocaine viscous 2% 15 mL and maalox/mylanta w/ simethicone 15 mL (GI COCKTAIL)  30 mL Oral Once     lisinopril  20 mg Oral BID     melatonin  5 mg Oral At Bedtime     metoprolol succinate  100 mg Oral At Bedtime     pantoprazole  40 mg Oral QAM AC     piperacillin-tazobactam  3.375 g Intravenous Q6H     senna-docusate  1 tablet Oral BID    Or     senna-docusate  2 tablet Oral BID     simvastatin  40 mg Oral At Bedtime     sodium chloride (PF)  10 mL Intracatheter Q8H     sodium chloride (PF)  3 mL Intracatheter Q8H       Data     Recent Labs  Lab 09/10/18  0653 09/09/18  0542 09/08/18  0614 09/08/18  0002 09/07/18  0744 09/06/18  1542   WBC 5.1 4.3 5.0  --  8.3 11.4*   HGB 11.9* 11.4* 12.1*  --  12.0* 13.9   MCV 87 86 88  --  88 87    156 148*  --  152 180   INR  --   --   --   --   --  1.36*    138 138  --  137 135   POTASSIUM 3.4 3.4 3.5  --  3.8 3.7   CHLORIDE 104 104 105  --  105 102   CO2 28 28 27  --  23 24   BUN 11 12 18  --  16 12   CR 0.94 0.95 1.11  --  1.09 0.85   ANIONGAP 7 6 6  --  9 9   BETTIE 8.6 8.2* 8.3*  --  7.8* 8.1*   * 126* 158*  --  248* 214*   ALBUMIN  --   --   --   --  3.0* 3.5   PROTTOTAL  --   --   --   --  5.8* 6.4*   BILITOTAL  --   --   --   --  1.9* 2.3*   ALKPHOS  --   --   --   --  64 79   ALT  --   --   --   --  27 23   AST  --   --   --   --  14 12   TROPI  --   --  <0.015 <0.015  --   --        Imaging:  No results found for this or any previous visit (from the past 24 hour(s)).    -Data reviewed today: I reviewed all new labs and imaging results over the last 24 hours. I personally reviewed no images or EKG's today.

## 2018-09-11 NOTE — BRIEF OP NOTE
[facility] Brief Operative Note    Pre-operative diagnosis: great toe ulcer, left foot  Ulcer with osteomyelitis, right 3rd toe   Post-operative diagnosis smae   Procedure: 1) I&D ulcer left great toe  2) Amputation 3rd toe right foot at DIP level   Surgeon(s): Surgeon(s) and Role:     * Miki Harp MD - Primary     * Nickie Baxter PA-C - Assisting   Estimated blood loss: 2 mL   Specimens:   ID Type Source Tests Collected by Time Destination   A : Third toe, right. Tissue Toe SURGICAL PATHOLOGY EXAM Miki Harp MD 9/11/2018  8:52 AM       Findings: ANES: reg + MAC  No sinus tract on left great toe.  Ulcer not through subQ

## 2018-09-11 NOTE — ANESTHESIA POSTPROCEDURE EVALUATION
Patient: Jayro Elder    Procedure(s):  Irrigation & debridement Left Foot Ulcer 1st. Metatarsophalangeal Joint;  Right amputation Distal Phalanx 3rd. toe. - Wound Class: II-Clean Contaminated    Diagnosis:great toe ulcer on left foot  Diagnosis Additional Information: Pre-operative diagnosis: great toe ulcer, left foot  Ulcer with osteomyelitis, right 3rd toe  Post-operative diagnosis smae  Procedure: 1) I&D ulcer left great toe  2) Amputation 3rd toe right foot at DIP level        Anesthesia Type:  MAC, Periph. Nerve Block for postop pain    Note:  Anesthesia Post Evaluation    Patient location during evaluation: Phase 2  Patient participation: Able to fully participate in evaluation  Level of consciousness: awake  Pain management: adequate  Airway patency: patent  Cardiovascular status: acceptable  Respiratory status: acceptable  Hydration status: euvolemic  PONV: controlled     Anesthetic complications: None          Last vitals:  Vitals:    09/11/18 1142 09/11/18 1200 09/11/18 1232   BP: (!) 161/96 (!) 161/98 (!) 166/95   Pulse:      Resp: 12 11 17   Temp: 96.4  F (35.8  C)     SpO2: 96% 95% 92%         Electronically Signed By: Danilo Hall MD  September 11, 2018  1:26 PM

## 2018-09-12 ENCOUNTER — APPOINTMENT (OUTPATIENT)
Dept: PHYSICAL THERAPY | Facility: CLINIC | Age: 68
DRG: 617 | End: 2018-09-12
Attending: ORTHOPAEDIC SURGERY
Payer: MEDICARE

## 2018-09-12 LAB
ANION GAP SERPL CALCULATED.3IONS-SCNC: 7 MMOL/L (ref 3–14)
BASOPHILS # BLD AUTO: 0.1 10E9/L (ref 0–0.2)
BASOPHILS NFR BLD AUTO: 0.7 %
BUN SERPL-MCNC: 11 MG/DL (ref 7–30)
CALCIUM SERPL-MCNC: 8.3 MG/DL (ref 8.5–10.1)
CHLORIDE SERPL-SCNC: 105 MMOL/L (ref 94–109)
CO2 SERPL-SCNC: 27 MMOL/L (ref 20–32)
CREAT SERPL-MCNC: 0.96 MG/DL (ref 0.66–1.25)
DIFFERENTIAL METHOD BLD: ABNORMAL
EOSINOPHIL # BLD AUTO: 0.3 10E9/L (ref 0–0.7)
EOSINOPHIL NFR BLD AUTO: 3.8 %
ERYTHROCYTE [DISTWIDTH] IN BLOOD BY AUTOMATED COUNT: 13 % (ref 10–15)
GFR SERPL CREATININE-BSD FRML MDRD: 78 ML/MIN/1.7M2
GLUCOSE BLDC GLUCOMTR-MCNC: 100 MG/DL (ref 70–99)
GLUCOSE BLDC GLUCOMTR-MCNC: 100 MG/DL (ref 70–99)
GLUCOSE BLDC GLUCOMTR-MCNC: 122 MG/DL (ref 70–99)
GLUCOSE BLDC GLUCOMTR-MCNC: 128 MG/DL (ref 70–99)
GLUCOSE BLDC GLUCOMTR-MCNC: 132 MG/DL (ref 70–99)
GLUCOSE SERPL-MCNC: 111 MG/DL (ref 70–99)
HCT VFR BLD AUTO: 38.2 % (ref 40–53)
HGB BLD-MCNC: 13.1 G/DL (ref 13.3–17.7)
IMM GRANULOCYTES # BLD: 0.3 10E9/L (ref 0–0.4)
IMM GRANULOCYTES NFR BLD: 3.4 %
LYMPHOCYTES # BLD AUTO: 1.6 10E9/L (ref 0.8–5.3)
LYMPHOCYTES NFR BLD AUTO: 21.4 %
MCH RBC QN AUTO: 29.8 PG (ref 26.5–33)
MCHC RBC AUTO-ENTMCNC: 34.3 G/DL (ref 31.5–36.5)
MCV RBC AUTO: 87 FL (ref 78–100)
MONOCYTES # BLD AUTO: 0.7 10E9/L (ref 0–1.3)
MONOCYTES NFR BLD AUTO: 9 %
NEUTROPHILS # BLD AUTO: 4.6 10E9/L (ref 1.6–8.3)
NEUTROPHILS NFR BLD AUTO: 61.7 %
NRBC # BLD AUTO: 0 10*3/UL
NRBC BLD AUTO-RTO: 0 /100
PLATELET # BLD AUTO: 235 10E9/L (ref 150–450)
POTASSIUM SERPL-SCNC: 4.3 MMOL/L (ref 3.4–5.3)
RBC # BLD AUTO: 4.39 10E12/L (ref 4.4–5.9)
SODIUM SERPL-SCNC: 139 MMOL/L (ref 133–144)
WBC # BLD AUTO: 7.4 10E9/L (ref 4–11)

## 2018-09-12 PROCEDURE — A9270 NON-COVERED ITEM OR SERVICE: HCPCS | Mod: GY | Performed by: INTERNAL MEDICINE

## 2018-09-12 PROCEDURE — 25000132 ZZH RX MED GY IP 250 OP 250 PS 637: Mod: GY | Performed by: INTERNAL MEDICINE

## 2018-09-12 PROCEDURE — L3260 AMBULATORY SURGICAL BOOT EAC: HCPCS

## 2018-09-12 PROCEDURE — 25000128 H RX IP 250 OP 636: Performed by: ORTHOPAEDIC SURGERY

## 2018-09-12 PROCEDURE — 97161 PT EVAL LOW COMPLEX 20 MIN: CPT | Mod: GP | Performed by: PHYSICAL THERAPIST

## 2018-09-12 PROCEDURE — A9270 NON-COVERED ITEM OR SERVICE: HCPCS | Mod: GY | Performed by: ORTHOPAEDIC SURGERY

## 2018-09-12 PROCEDURE — 80048 BASIC METABOLIC PNL TOTAL CA: CPT | Performed by: ORTHOPAEDIC SURGERY

## 2018-09-12 PROCEDURE — 12000007 ZZH R&B INTERMEDIATE

## 2018-09-12 PROCEDURE — 27211289 ZZ H KIT CATH IV 4FR 8 CM OR 10 CM, POWERWAND QUICK XL

## 2018-09-12 PROCEDURE — 25000128 H RX IP 250 OP 636: Performed by: INTERNAL MEDICINE

## 2018-09-12 PROCEDURE — 85025 COMPLETE CBC W/AUTO DIFF WBC: CPT | Performed by: ORTHOPAEDIC SURGERY

## 2018-09-12 PROCEDURE — A9270 NON-COVERED ITEM OR SERVICE: HCPCS | Mod: GY | Performed by: HOSPITALIST

## 2018-09-12 PROCEDURE — 97530 THERAPEUTIC ACTIVITIES: CPT | Mod: GP | Performed by: PHYSICAL THERAPIST

## 2018-09-12 PROCEDURE — 36569 INSJ PICC 5 YR+ W/O IMAGING: CPT

## 2018-09-12 PROCEDURE — A9270 NON-COVERED ITEM OR SERVICE: HCPCS | Mod: GY | Performed by: PHYSICIAN ASSISTANT

## 2018-09-12 PROCEDURE — 25000132 ZZH RX MED GY IP 250 OP 250 PS 637: Mod: GY | Performed by: HOSPITALIST

## 2018-09-12 PROCEDURE — 40000193 ZZH STATISTIC PT WARD VISIT: Performed by: PHYSICAL THERAPIST

## 2018-09-12 PROCEDURE — 25000132 ZZH RX MED GY IP 250 OP 250 PS 637: Mod: GY | Performed by: ORTHOPAEDIC SURGERY

## 2018-09-12 PROCEDURE — 25000132 ZZH RX MED GY IP 250 OP 250 PS 637: Mod: GY | Performed by: PHYSICIAN ASSISTANT

## 2018-09-12 PROCEDURE — 36416 COLLJ CAPILLARY BLOOD SPEC: CPT | Performed by: ORTHOPAEDIC SURGERY

## 2018-09-12 PROCEDURE — 99232 SBSQ HOSP IP/OBS MODERATE 35: CPT | Performed by: INTERNAL MEDICINE

## 2018-09-12 PROCEDURE — 97116 GAIT TRAINING THERAPY: CPT | Mod: GP | Performed by: PHYSICAL THERAPIST

## 2018-09-12 PROCEDURE — 00000146 ZZHCL STATISTIC GLUCOSE BY METER IP

## 2018-09-12 RX ORDER — CEFTRIAXONE 2 G/1
2 INJECTION, POWDER, FOR SOLUTION INTRAMUSCULAR; INTRAVENOUS EVERY 24 HOURS
Status: DISCONTINUED | OUTPATIENT
Start: 2018-09-12 | End: 2018-09-17 | Stop reason: HOSPADM

## 2018-09-12 RX ORDER — HEPARIN SODIUM,PORCINE 10 UNIT/ML
2-5 VIAL (ML) INTRAVENOUS
Status: DISCONTINUED | OUTPATIENT
Start: 2018-09-12 | End: 2018-09-12

## 2018-09-12 RX ORDER — METOPROLOL SUCCINATE 50 MG/1
50 TABLET, EXTENDED RELEASE ORAL AT BEDTIME
Status: DISCONTINUED | OUTPATIENT
Start: 2018-09-12 | End: 2018-09-17 | Stop reason: HOSPADM

## 2018-09-12 RX ORDER — CEFTRIAXONE 1 G/1
2000 INJECTION, POWDER, FOR SOLUTION INTRAMUSCULAR; INTRAVENOUS DAILY
Qty: 600 ML | Refills: 0 | DISCHARGE
Start: 2018-09-12 | End: 2019-03-07

## 2018-09-12 RX ORDER — LIDOCAINE 40 MG/G
CREAM TOPICAL
Status: DISCONTINUED | OUTPATIENT
Start: 2018-09-12 | End: 2018-09-12

## 2018-09-12 RX ADMIN — ACETAMINOPHEN 975 MG: 325 TABLET, FILM COATED ORAL at 21:15

## 2018-09-12 RX ADMIN — SENNOSIDES AND DOCUSATE SODIUM 2 TABLET: 8.6; 5 TABLET ORAL at 21:16

## 2018-09-12 RX ADMIN — METOPROLOL SUCCINATE 50 MG: 50 TABLET, EXTENDED RELEASE ORAL at 21:14

## 2018-09-12 RX ADMIN — Medication 5 MG: at 21:15

## 2018-09-12 RX ADMIN — GABAPENTIN 300 MG: 300 CAPSULE ORAL at 21:16

## 2018-09-12 RX ADMIN — ISOSORBIDE MONONITRATE 30 MG: 30 TABLET, EXTENDED RELEASE ORAL at 07:55

## 2018-09-12 RX ADMIN — AMLODIPINE BESYLATE 5 MG: 5 TABLET ORAL at 07:55

## 2018-09-12 RX ADMIN — SIMVASTATIN 40 MG: 40 TABLET, FILM COATED ORAL at 21:15

## 2018-09-12 RX ADMIN — SODIUM CHLORIDE, POTASSIUM CHLORIDE, SODIUM LACTATE AND CALCIUM CHLORIDE: 600; 310; 30; 20 INJECTION, SOLUTION INTRAVENOUS at 09:53

## 2018-09-12 RX ADMIN — LEVOTHYROXINE SODIUM 137 MCG: 25 TABLET ORAL at 21:16

## 2018-09-12 RX ADMIN — LISINOPRIL 20 MG: 20 TABLET ORAL at 07:56

## 2018-09-12 RX ADMIN — APIXABAN 5 MG: 5 TABLET, FILM COATED ORAL at 21:15

## 2018-09-12 RX ADMIN — GABAPENTIN 300 MG: 300 CAPSULE ORAL at 07:55

## 2018-09-12 RX ADMIN — APIXABAN 5 MG: 5 TABLET, FILM COATED ORAL at 07:55

## 2018-09-12 RX ADMIN — TAZOBACTAM SODIUM AND PIPERACILLIN SODIUM 3.38 G: 375; 3 INJECTION, SOLUTION INTRAVENOUS at 05:42

## 2018-09-12 RX ADMIN — ACETAMINOPHEN 975 MG: 325 TABLET, FILM COATED ORAL at 05:40

## 2018-09-12 RX ADMIN — TAZOBACTAM SODIUM AND PIPERACILLIN SODIUM 3.38 G: 375; 3 INJECTION, SOLUTION INTRAVENOUS at 12:15

## 2018-09-12 RX ADMIN — CEFTRIAXONE SODIUM 2 G: 2 INJECTION, POWDER, FOR SOLUTION INTRAMUSCULAR; INTRAVENOUS at 16:26

## 2018-09-12 RX ADMIN — GABAPENTIN 300 MG: 300 CAPSULE ORAL at 16:11

## 2018-09-12 RX ADMIN — TAZOBACTAM SODIUM AND PIPERACILLIN SODIUM 3.38 G: 375; 3 INJECTION, SOLUTION INTRAVENOUS at 00:47

## 2018-09-12 RX ADMIN — ACETAMINOPHEN 975 MG: 325 TABLET, FILM COATED ORAL at 14:01

## 2018-09-12 RX ADMIN — PANTOPRAZOLE SODIUM 40 MG: 40 TABLET, DELAYED RELEASE ORAL at 07:55

## 2018-09-12 RX ADMIN — LISINOPRIL 20 MG: 20 TABLET ORAL at 21:17

## 2018-09-12 ASSESSMENT — ACTIVITIES OF DAILY LIVING (ADL)
ADLS_ACUITY_SCORE: 8
ADLS_ACUITY_SCORE: 9
ADLS_ACUITY_SCORE: 8

## 2018-09-12 NOTE — PROGRESS NOTES
[facility]    Hospitalist Progress Note    Date of Service: 09/12/2018    Interval History   Appears to have tolerated OR well today. No pain on present PCA settings. No nausea, vomiting, chest pain, dyspnea or abdominal discomfort    Assessment & Plan   This is a pleasant 68-year-old gentleman with a past medical history of A. 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    He is noted to have a left diabetic foot ulcer as well as a right third toe cellulitis.  He believes he had upcoming amputation plans but the patient seemed confused about these.  He was admitted to medicine with orthopedics consultation.  He underwent MRI of his left and right feet and was found to have osteomyelitis of his right third toe.  Blood cultures are growing strep agalactiae and so he is being kept in the hospital pending surgical management of his infectious source.    Strep agalactiae bacteremia  Right third toe cellulitis and osteomyelitis  -TTE negative  -Nontoxic  -s/p I&D of left foot ulcer and distal right 3rd phalanx amputation POD#1  -Appreciate orthopedics and ID consultation  -Continue current antibiotics for now, duration TBD  -Seen and fitted by Orthotist  -PT evaluation  -Antibiotic duration/choice per ID, otherwise doing well, anticipate discharge today or tomorrow      Chronic Systolic CHF  -Euvolemic  -EF 35% by echo this visit, stable  -Continue ACE/BB    CAD status post PCI  -Most recently stented June 2017  -On Eliquis/Plavix, held for OR, resume ASAP post-op  -BP control, statin    Type 2 diabetes with neuropathy on insulin  -Appreciate CDE input  -Change to Toujeo 35 units every morning plus NovoLog 10 TIDAC  -Correctional insulin    Atrial fibrillation  History of  CVA  -Holding Eliquis for surgery  -Continue his home metoprolol  -Did have a 2.2 second pause on telemetry, continue to monitor, may need beta-blocker reduction if ongoing issue    Hypothyroidism  -Synthroid    GERD  -Protonix      Discussed with: Patient's RN  # Pain Assessment:  Current Pain Score 9/12/2018   Patient currently in pain? denies   Pain score (0-10) -   Pain location -   Pain descriptors -   - Jayro is experiencing pain due to osteomyelitis. Pain management was discussed and the plan was created in a collaborative fashion.  Jayro's response to the current recommendations: engaged  - Please see the plan for pain management as documented above         DVT Prophylaxis: Eliquis  Code Status: Full Code    Disposition: Expected discharge pending surgical progress    Herb Zurita III, MD  [phone] (Cell)  [phone] (Pager)  Please call or text with any questions or concerns.        Physical Exam   Temp: 96.2  F (35.7  C) Temp src: Oral BP: (!) 138/93   Heart Rate: 69 Resp: 18 SpO2: 96 % O2 Device: None (Room air) Oxygen Delivery: 1 LPM  Vitals:    09/06/18 1603 09/06/18 2053   Weight: 113.4 kg (250 lb) 115.1 kg (253 lb 11.2 oz)     Vital Signs with Ranges  Temp:  [95.7  F (35.4  C)-96.9  F (36.1  C)] 96.2  F (35.7  C)  Heart Rate:  [57-70] 69  Resp:  [12-18] 18  BP: (129-162)/(76-94) 138/93  SpO2:  [84 %-98 %] 96 %  I/O last 3 completed shifts:  In: 1888 [P.O.:580; I.V.:1308]  Out: 1972 [Urine:1970; Blood:2]    Constitutional: Awake, alert, cooperative, no apparent distress  Respiratory: Clear to auscultation bilaterally, no crackles or wheezing  Cardiovascular: S1S2, well-perfused, no edema  GI: Normal bowel sounds, soft, non-distended, non-tender  Skin/Integumentary: No rashes or other lesions noted  Other:     Medications     - MEDICATION INSTRUCTIONS -       lactated ringers 50 mL/hr at 09/12/18 0953       acetaminophen  975 mg Oral Q8H     amLODIPine  5 mg Oral Daily     apixaban  ANTICOAGULANT  5 mg Oral BID     doxylamine  12.5 mg Oral At Bedtime     gabapentin  300 mg Oral TID     insulin aspart  10 Units Subcutaneous TID w/meals     insulin aspart  1-10 Units Subcutaneous TID AC     insulin aspart  1-7 Units Subcutaneous At Bedtime     insulin glargine U-300  35 Units Subcutaneous QAM     isosorbide mononitrate  30 mg Oral Daily     levothyroxine  137 mcg Oral At Bedtime     lisinopril  20 mg Oral BID     melatonin  5 mg Oral At Bedtime     metoprolol succinate  50 mg Oral At Bedtime     pantoprazole  40 mg Oral QAM AC     piperacillin-tazobactam  3.375 g Intravenous Q6H     senna-docusate  1 tablet Oral BID    Or     senna-docusate  2 tablet Oral BID     simvastatin  40 mg Oral At Bedtime     sodium chloride (PF)  10 mL Intracatheter Q8H     sodium chloride (PF)  3 mL Intracatheter Q8H       Data     Recent Labs  Lab 09/12/18  0725 09/10/18  0653 09/09/18  0542 09/08/18  0614 09/08/18  0002 09/07/18  0744 09/06/18  1542   WBC 7.4 5.1 4.3 5.0  --  8.3 11.4*   HGB 13.1* 11.9* 11.4* 12.1*  --  12.0* 13.9   MCV 87 87 86 88  --  88 87    186 156 148*  --  152 180   INR  --   --   --   --   --   --  1.36*    139 138 138  --  137 135   POTASSIUM 4.3 3.4 3.4 3.5  --  3.8 3.7   CHLORIDE 105 104 104 105  --  105 102   CO2 27 28 28 27  --  23 24   BUN 11 11 12 18  --  16 12   CR 0.96 0.94 0.95 1.11  --  1.09 0.85   ANIONGAP 7 7 6 6  --  9 9   BETTIE 8.3* 8.6 8.2* 8.3*  --  7.8* 8.1*   * 126* 126* 158*  --  248* 214*   ALBUMIN  --   --   --   --   --  3.0* 3.5   PROTTOTAL  --   --   --   --   --  5.8* 6.4*   BILITOTAL  --   --   --   --   --  1.9* 2.3*   ALKPHOS  --   --   --   --   --  64 79   ALT  --   --   --   --   --  27 23   AST  --   --   --   --   --  14 12   TROPI  --   --   --  <0.015 <0.015  --   --        Imaging:  No results found for this or any previous visit (from the past 24 hour(s)).    -Data reviewed today: I reviewed all new labs and imaging results over the last  24 hours. I personally reviewed no images or EKG's today.

## 2018-09-12 NOTE — PLAN OF CARE
Problem: Patient Care Overview  Goal: Plan of Care/Patient Progress Review  Outcome: Improving  Ambulatory Status:  Pt unable to get up at this time due heel weightbearing precautions. Waiting for PT to show patient proper way to walk.  VS:  Vital signs:  Temp: 96.9  F (36.1  C) Temp src: Oral BP: 160/76   Heart Rate: 67 Resp: 18 SpO2: 94 % O2 Device: None (Room air) on RA. Capno running 35-40 EtCO2 and 8-10 IPI.   Pain:  None noted. 0mg of IV dilaudid was given via PCA pump.  Resp: LS clear  GI:  no nausea.  fair appetite and on mod carb diet.  BS hypoactive.  Passing flatus.  Last BM 9/10, senna given at HS.  :  Void 500 mL this shift with the urinal  Skin: toe dressings are CDI  Tx:  IV fluids LR running at 50mL/hr, IV zosyn, and blood sugar checks  Labs:   with dinner and 178 HS  Consults:  PT/OT/ID/WOC/ortho  Disposition:  TBD    Patient refused capno toward end of shift. Continuous pulse in place and stable. Not using PCA at this time and weaned off oxygen.

## 2018-09-12 NOTE — PLAN OF CARE
Problem: Patient Care Overview  Goal: Plan of Care/Patient Progress Review  Ambulatory Status:  Pt up 2 assist. Not OOB this shift. Pt is requesting to wait for PT. Orthotic shoes on   Orientation: a/o  VS:  VSS  Pain:  denies  Resp: LS clearr.  GI:  denies nausea.  good appetite, on mod carb diet. Hypo BS.  Passing flatus.  Last BM 9/10.  :  Voiding without difficulty   Skin:  guerrero foot dressing CDI   Tx:  Zosyn  Labs:  and 128  Consults:  PT, OT, ID, Ortho  Disposition:  tbd

## 2018-09-12 NOTE — PROGRESS NOTES
S: Pt. seen in [facility]. Male. Art PAGABBY, RX: Left Fore foot relief shoe, right Darco PO shoe.  O: Pt. had surgery on left 1st MTH plantar surface and Amputation of right 3rd toe.  A: Today I F/D a left fore foot relief Darco PO shoe size LG. and a right Darco shoe size X-Lg. Shoes to offload and protect surgical sites.  P: Pt. to be seen as needed.    This note has been electronically signed by ELINA Rey.

## 2018-09-12 NOTE — PLAN OF CARE
OT: Spoke with PT, pt not appropriate for IP OT assessment today. PT to see tomorrow and assess for IP OT needs as pt's mobility progresses and needs for home are more defined.

## 2018-09-12 NOTE — PLAN OF CARE
Problem: Patient Care Overview  Goal: Plan of Care/Patient Progress Review  Outcome: No Change  Patient complains of no pain, did not use PCA even one time overnight. Declined the scheduled toradol for pain as well. Numbness and tingling to fingers/feet at baseline. Bilateral feet dressings remain clean, dry and intact. No nausea, no dizziness, no urinary complaints. Continues on IVF and IV abx. Tele controlled afib rate in 60s (per tele tech), 3 second pause about 3:19am. Md notified.

## 2018-09-12 NOTE — PROGRESS NOTES
[facility]    Infectious Disease Progress Note    Date of Service (when I saw the patient): 09/12/2018     Assessment & Plan   Jayro Elder is a 68 year old male who was admitted on 9/6/2018.     INFECTIOUS IMPRESSION:   1.  A 68-year-old diabetic male with acute nausea, low-grade fever and probable right third toe and foot infection.  Cultures from foot, MSSA and Group B strep, blood cultures Group B strep.    2.  Chronic left first metatarsal ulcer, Chronic right foot ulcer, now worsening. S/P I and d of the left great toe and amputation 3rd toe in the right.   4.  Diabetes mellitus with neuropathy.   5.  Prior cerebrovascular infarction.   6.  Coronary artery disease.   7.  History of atrial fibrillation.       RECOMMENDATIONS:   1. s/p I &D ulcer left great toe Amputation 3rd toe right foot at DIP level  Given group B bacteremia will recommend 2 weeks total of IV antibiotics, I week done in the hospital if ready for discharge other wise ok to go on 7 more days of once a day ceftriaxone. Orders are in the chart.     Lexie Shelton MD    Interval History   Afebrile.     Physical Exam   Temp: 96.2  F (35.7  C) Temp src: Oral BP: (!) 138/93   Heart Rate: 69 Resp: 18 SpO2: 96 % O2 Device: None (Room air) Oxygen Delivery: 1 LPM  Vitals:    09/06/18 1603 09/06/18 2053   Weight: 113.4 kg (250 lb) 115.1 kg (253 lb 11.2 oz)     Vital Signs with Ranges  Temp:  [95.7  F (35.4  C)-96.9  F (36.1  C)] 96.2  F (35.7  C)  Heart Rate:  [60-70] 69  Resp:  [12-18] 18  BP: (129-162)/(76-94) 138/93  SpO2:  [84 %-98 %] 96 %    Constitutional: Awake, alert, cooperative, no apparent distress  Lungs: Clear to auscultation bilaterally, no crackles or wheezing  Cardiovascular: Regular rate and rhythm, normal S1 and S2, and no murmur noted  Abdomen: Normal bowel sounds, soft, non-distended, non-tender  Skin: No rashes, no cyanosis, no edema  Other:    Medications     - MEDICATION INSTRUCTIONS -       lactated ringers 50  mL/hr at 09/12/18 0953       acetaminophen  975 mg Oral Q8H     amLODIPine  5 mg Oral Daily     apixaban ANTICOAGULANT  5 mg Oral BID     doxylamine  12.5 mg Oral At Bedtime     gabapentin  300 mg Oral TID     insulin aspart  10 Units Subcutaneous TID w/meals     insulin aspart  1-10 Units Subcutaneous TID AC     insulin aspart  1-7 Units Subcutaneous At Bedtime     insulin glargine U-300  35 Units Subcutaneous QAM     isosorbide mononitrate  30 mg Oral Daily     levothyroxine  137 mcg Oral At Bedtime     lisinopril  20 mg Oral BID     melatonin  5 mg Oral At Bedtime     metoprolol succinate  50 mg Oral At Bedtime     pantoprazole  40 mg Oral QAM AC     piperacillin-tazobactam  3.375 g Intravenous Q6H     senna-docusate  1 tablet Oral BID    Or     senna-docusate  2 tablet Oral BID     simvastatin  40 mg Oral At Bedtime     sodium chloride (PF)  10 mL Intracatheter Q8H     sodium chloride (PF)  3 mL Intracatheter Q8H       Data   All microbiology laboratory data reviewed.  Recent Labs   Lab Test  09/12/18   0725  09/10/18   0653  09/09/18   0542   WBC  7.4  5.1  4.3   HGB  13.1*  11.9*  11.4*   HCT  38.2*  35.4*  33.6*   MCV  87  87  86   PLT  235  186  156     Recent Labs   Lab Test  09/12/18   0725  09/10/18   0653  09/09/18   0542   CR  0.96  0.94  0.95     Recent Labs   Lab Test  09/08/18   0614   SED  15     Recent Labs   Lab Test  09/09/18   1533  09/08/18   0613  09/06/18   1550  09/06/18   1545  09/06/18   1541  03/03/18   1936  03/03/18   1843   CULT  No growth after 3 days  No growth after 4 days  Cultured on the 1st day of incubation:  Streptococcus agalactiae sero group B  *  Critical Value/Significant Value, preliminary result only, called to and read back by  Teresa John RN on RHMS5 @105 on 9/7/18. ch.    (Note)  POSITIVE for STREPTOCOCCUS AGALACTIAE (Group B Strep) by [employer]  multiplex nucleic acid test. Penicillin and ampicillin are drugs of  choice, nonsusceptible isolates have not been  reported. Final  identification and antimicrobial susceptibility testing will be  verified by standard methods.    Specimen tested with [employer] multiplex, gram-positive blood culture  nucleic acid test for the following targets: Staph aureus, Staph  epidermidis, Staph lugdunensis, other Staph species, Enterococcus  faecalis, Enterococcus faecium, Streptococcus species, S. agalactiae,  S. anginosus grp., S. pneumoniae, S. pyogenes, Listeria sp., mecA  (methicillin resistance) and Elisha/B (vancomycin resistance).    Critical Value/Significant Value called to and read back by Teresa John RN on 9.7.18 at 1340. bw      Cultured on the 1st day of incubation:  Streptococcus agalactiae sero group B  Susceptibility testing done on previous specimen  *  Critical Value/Significant Value, preliminary result only, called to and read back by  Ofe Fernandes RN on RHMS5 @1516 on 9/7/18. ch.    Heavy growth  Staphylococcus aureus  *  Heavy growth  Beta hemolytic Streptococcus group B  *  Moderate growth  Normal skin michael    No growth  No growth  No growth

## 2018-09-12 NOTE — PROGRESS NOTES
S:  POD #1 s/p I & D 1st MTP joint plantar ulcer left foot, amputation distal phalanx 3rd toe right foot.  Patient reports he is doing well at this time.  He is not taking any medication for pain and reports no pain currently.  He continues on Zosyn for antibiotic coverage.  He is planning to go home upon discharge, provided he can get around and clears PT.    O:  T: 95.7;  HR: 62;  R: 18;  BP: 162/92;  SpO2: 98    Patient is lying in bed resting, his legs are on pillows.  His dressings are clean, dry and intact bilaterally.  He is able to wiggle his toes bilaterally without difficulty.  Neurovascular exam is at his baseline, which is altered due to neuropathy.    A/P:  POD #1 s/p I & D 1st MTP joint plantar ulcer left foot, amputation distal phalanx 3rd toe right foot  Continue antibiotics per ID team recommendations  Order placed to resume Eliquis for anticoagulation  Patient may be heel weightbearing bilaterally, should not put weight on midfoot or forefoot  Wound care follow up for 1st MTP wound left foot, 3rd toe distal phalanx amputation right foot  Orders placed for wedge Darco shoe for the left foot, flat Darco for the right foot  PT/OT for gait, transfers and ADL's  Oral medication as needed for pain  Appreciate hospitalist team management of medical comorbidities  Patient may discharge home when cleared by PT/OT, pain is managed, home antibiotic plan is in place    Flavio Baxter PA-C

## 2018-09-12 NOTE — PLAN OF CARE
Problem: Goal Outcome Summary  Goal: Goal Outcome Summary  PT: Pt resting in bed upon arrival, agreeable to PT. Pt continues to report double vision and impaired depth perception. At rest: /91, HR 67. RN aware and in agreement with PT. Pt performs bed mobility with SBA, sit<>stand transfers without AD with CGA/SBA, sit<>stand transfers with SEC on R with SBA. Pt ambulates 200' with SEC on R, SBA. Noting mild lateral path deviations and unsteadiness, no overt LOB noted. Pt able to ascend/descend 12 stairs with single rail and SBA. Alfaro Balance test performed, pt scored 40/56 placing him at increased risk for falls. After activity: /96, HR 63.  Recommend pt discharge home with use of SEC, increased assist from spouse, as well as OPPT to progress balance and safety with functional mobility.       no

## 2018-09-12 NOTE — PLAN OF CARE
Problem: Patient Care Overview  Goal: Plan of Care/Patient Progress Review    PT:  Neal complete. Pt seen POD #1 s/p I & D 1st MTP joint plantar ulcer left foot, amputation distal phalanx 3rd toe right foot, due to osteomyelitis and cellulitis.  He lives with spouse in a multi-level home, spouse works full time yet, pt is retired.  He plans to stay on the main level of his home, has SEC and 4WW. Previous CVA with mild balance issues at baseline, diabetic neuropathy with poor sensation in feet.    Discharge Planner PT   Patient plan for discharge: home  Current status: Ed pt on heel WB restriction B, currently has forefoot offloading shoe L foot, flat rigid shoe for R foot.  Limited ability to fully heel WB only RLE given neuropathy and rigid foot flat shoe.  Has lift chair at home, does well transferring in/out of elevated bed surface, CGA/SBA for safety.  Amb at bedside with FWW, advised using FWW instead of his 4WW for improved safety and ability to offload with UEs.  Advised focusing on short distance mobility only at this time to optimize healing/recovery of surgical sites.  Order placed for FWW.  Legs elevated when at rest, no pain reported.  Barriers to return to prior living situation: 2 steps to enter home, likely limited ability to realistically maintain heel WB B  Recommendations for discharge: home with spouse support prn, HHPT, FWW  Rationale for recommendations: Anticipate he will be able to modify his mobility in order to discharge home with FWW, using his power lift chair/recliner.  Appears to demonstrate functional ROM/mobility to perform ADLs, may not need skilled OT intervention at this time.  Will benefit from ongoing PT tomorrow to optimize safety with mobility skills and address stairs to enter home.       Entered by: Wood Nguyen 09/12/2018 5:02 PM

## 2018-09-12 NOTE — PROVIDER NOTIFICATION
Paged MD: Pt had <2.5 pause with 25 mm/s sweep speed at 2044, was notified with tele strip at this time.

## 2018-09-13 ENCOUNTER — HOME INFUSION (PRE-WILLOW HOME INFUSION) (OUTPATIENT)
Dept: PHARMACY | Facility: CLINIC | Age: 68
End: 2018-09-13

## 2018-09-13 LAB
COPATH REPORT: NORMAL
GLUCOSE BLDC GLUCOMTR-MCNC: 102 MG/DL (ref 70–99)
GLUCOSE BLDC GLUCOMTR-MCNC: 116 MG/DL (ref 70–99)
GLUCOSE BLDC GLUCOMTR-MCNC: 136 MG/DL (ref 70–99)
GLUCOSE BLDC GLUCOMTR-MCNC: 143 MG/DL (ref 70–99)
GLUCOSE BLDC GLUCOMTR-MCNC: 99 MG/DL (ref 70–99)
GLUCOSE SERPL-MCNC: 123 MG/DL (ref 70–99)
HGB BLD-MCNC: 13.9 G/DL (ref 13.3–17.7)
TROPONIN I SERPL-MCNC: <0.015 UG/L (ref 0–0.04)

## 2018-09-13 PROCEDURE — 25000132 ZZH RX MED GY IP 250 OP 250 PS 637: Mod: GY | Performed by: ORTHOPAEDIC SURGERY

## 2018-09-13 PROCEDURE — 99221 1ST HOSP IP/OBS SF/LOW 40: CPT | Performed by: INTERNAL MEDICINE

## 2018-09-13 PROCEDURE — 25000132 ZZH RX MED GY IP 250 OP 250 PS 637: Mod: GY | Performed by: PHYSICIAN ASSISTANT

## 2018-09-13 PROCEDURE — 84484 ASSAY OF TROPONIN QUANT: CPT | Performed by: INTERNAL MEDICINE

## 2018-09-13 PROCEDURE — 93005 ELECTROCARDIOGRAM TRACING: CPT

## 2018-09-13 PROCEDURE — 25000132 ZZH RX MED GY IP 250 OP 250 PS 637: Mod: GY | Performed by: INTERNAL MEDICINE

## 2018-09-13 PROCEDURE — A9270 NON-COVERED ITEM OR SERVICE: HCPCS | Mod: GY | Performed by: ORTHOPAEDIC SURGERY

## 2018-09-13 PROCEDURE — 00000146 ZZHCL STATISTIC GLUCOSE BY METER IP

## 2018-09-13 PROCEDURE — 99232 SBSQ HOSP IP/OBS MODERATE 35: CPT | Performed by: INTERNAL MEDICINE

## 2018-09-13 PROCEDURE — A9270 NON-COVERED ITEM OR SERVICE: HCPCS | Mod: GY | Performed by: INTERNAL MEDICINE

## 2018-09-13 PROCEDURE — 25000128 H RX IP 250 OP 636: Performed by: ORTHOPAEDIC SURGERY

## 2018-09-13 PROCEDURE — 25000132 ZZH RX MED GY IP 250 OP 250 PS 637: Mod: GY | Performed by: HOSPITALIST

## 2018-09-13 PROCEDURE — 36415 COLL VENOUS BLD VENIPUNCTURE: CPT | Performed by: INTERNAL MEDICINE

## 2018-09-13 PROCEDURE — 85018 HEMOGLOBIN: CPT | Performed by: INTERNAL MEDICINE

## 2018-09-13 PROCEDURE — 12000007 ZZH R&B INTERMEDIATE

## 2018-09-13 PROCEDURE — A9270 NON-COVERED ITEM OR SERVICE: HCPCS | Mod: GY | Performed by: HOSPITALIST

## 2018-09-13 PROCEDURE — 25000128 H RX IP 250 OP 636: Performed by: INTERNAL MEDICINE

## 2018-09-13 PROCEDURE — 40000275 ZZH STATISTIC RCP TIME EA 10 MIN

## 2018-09-13 PROCEDURE — A9270 NON-COVERED ITEM OR SERVICE: HCPCS | Mod: GY | Performed by: PHYSICIAN ASSISTANT

## 2018-09-13 PROCEDURE — 82947 ASSAY GLUCOSE BLOOD QUANT: CPT | Performed by: INTERNAL MEDICINE

## 2018-09-13 RX ADMIN — PANTOPRAZOLE SODIUM 40 MG: 40 TABLET, DELAYED RELEASE ORAL at 08:31

## 2018-09-13 RX ADMIN — APIXABAN 5 MG: 5 TABLET, FILM COATED ORAL at 20:38

## 2018-09-13 RX ADMIN — SENNOSIDES AND DOCUSATE SODIUM 2 TABLET: 8.6; 5 TABLET ORAL at 20:38

## 2018-09-13 RX ADMIN — AMLODIPINE BESYLATE 5 MG: 5 TABLET ORAL at 08:24

## 2018-09-13 RX ADMIN — METOPROLOL SUCCINATE 50 MG: 50 TABLET, EXTENDED RELEASE ORAL at 22:11

## 2018-09-13 RX ADMIN — GABAPENTIN 300 MG: 300 CAPSULE ORAL at 15:52

## 2018-09-13 RX ADMIN — SIMVASTATIN 40 MG: 40 TABLET, FILM COATED ORAL at 22:10

## 2018-09-13 RX ADMIN — LEVOTHYROXINE SODIUM 137 MCG: 25 TABLET ORAL at 22:10

## 2018-09-13 RX ADMIN — GABAPENTIN 300 MG: 300 CAPSULE ORAL at 22:10

## 2018-09-13 RX ADMIN — ACETAMINOPHEN 975 MG: 325 TABLET, FILM COATED ORAL at 22:11

## 2018-09-13 RX ADMIN — GABAPENTIN 300 MG: 300 CAPSULE ORAL at 08:24

## 2018-09-13 RX ADMIN — SENNOSIDES 2 TABLET: 8.6 TABLET, FILM COATED ORAL at 17:53

## 2018-09-13 RX ADMIN — SENNOSIDES AND DOCUSATE SODIUM 1 TABLET: 8.6; 5 TABLET ORAL at 08:24

## 2018-09-13 RX ADMIN — CEFTRIAXONE SODIUM 2 G: 2 INJECTION, POWDER, FOR SOLUTION INTRAMUSCULAR; INTRAVENOUS at 15:52

## 2018-09-13 RX ADMIN — LISINOPRIL 20 MG: 20 TABLET ORAL at 08:24

## 2018-09-13 RX ADMIN — APIXABAN 5 MG: 5 TABLET, FILM COATED ORAL at 08:24

## 2018-09-13 RX ADMIN — ISOSORBIDE MONONITRATE 30 MG: 30 TABLET, EXTENDED RELEASE ORAL at 08:24

## 2018-09-13 RX ADMIN — LISINOPRIL 20 MG: 20 TABLET ORAL at 20:38

## 2018-09-13 RX ADMIN — ACETAMINOPHEN 975 MG: 325 TABLET, FILM COATED ORAL at 05:26

## 2018-09-13 RX ADMIN — SODIUM CHLORIDE, POTASSIUM CHLORIDE, SODIUM LACTATE AND CALCIUM CHLORIDE: 600; 310; 30; 20 INJECTION, SOLUTION INTRAVENOUS at 05:27

## 2018-09-13 RX ADMIN — Medication 5 MG: at 22:10

## 2018-09-13 RX ADMIN — ACETAMINOPHEN 975 MG: 325 TABLET, FILM COATED ORAL at 13:25

## 2018-09-13 ASSESSMENT — ACTIVITIES OF DAILY LIVING (ADL)
ADLS_ACUITY_SCORE: 9

## 2018-09-13 NOTE — PROGRESS NOTES
"[facility]    Hospitalist Progress Note    Date of Service: 09/13/2018    Interval History   Pain-free.  No nausea/vomiting    Pauses on telemetry are reduced on the lower dose of metoprolol though he did have one for about 2.5 seconds this morning    Today he described chest discomfort which is associated with dyspnea and some radiation into his jaw.  He describes it as a \"groaning\" discomfort and it has been coming and going today while remaining in bed    Assessment & Plan   This is a pleasant 68-year-old gentleman with a past medical history of A. 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    He is noted to have a left diabetic foot ulcer as well as a right third toe cellulitis.  He believes he had upcoming amputation plans but the patient seemed confused about these.  He was admitted to medicine with orthopedics consultation.  He underwent MRI of his left and right feet and was found to have osteomyelitis of his right third toe.  Blood cultures are growing strep agalactiae and so he is being kept in the hospital pending surgical management of his infectious source.    Chest pain equivalent  -Though atypical, he is certainly high risk with previous PCI, significant diabetes with neuropathy and vasculopathy  -We will check an EKG and a troponin.  He is a patient of Dr. Mckeon and I will ask cardiology to assist with risk stratification, he may benefit from repeat stress testing    Pauses on Telemetry  Atrial fibrillation  History of CVA  -Held Eliquis for surgery, restarted after  -Was having 2.5-3 second pauses frequently on telemetry on the evening of 9/11-9/12.  I reduce his Toprol from 100 mg to 50 mg and he is having fewer pauses though they do persist  -Suspect " this will improve after an additional half-life but I am hesitant to reduce his beta-blocker further given his history of A. fib and hopefully cardiology can comment on this as well    Strep agalactiae bacteremia  Right third toe cellulitis and osteomyelitis  -TTE negative  -Nontoxic  -s/p I&D of left foot ulcer and distal right 3rd phalanx amputation POD#1  -Appreciate orthopedics and ID consultation  -Seen and fitted by Orthotist  -He will do outpatient infusion of ceftriaxone to complete his course  -Has declined home services  -For additional inpatient PT session today, then tentatively okay for discharge    Chronic Systolic CHF  -Euvolemic  -EF 35% by echo this visit, stable  -Continue ACE/BB    CAD status post PCI  -Most recently stented June 2017  -On Eliquis/Plavix, held for OR, resumed ASAP post-op  -BP control, statin    Type 2 diabetes with neuropathy on insulin  -Appreciate CDE input  -Changed to Toujeo 35 units every morning plus NovoLog 10 TIDAC  -Correctional insulin    Hypothyroidism  -Synthroid    GERD  -Protonix      Discussed with: Patient's RN  # Pain Assessment:  Current Pain Score 9/13/2018   Patient currently in pain? denies   Pain score (0-10) -   Pain location -   Pain descriptors -   - Jayro is experiencing pain due to osteomyelitis. Pain management was discussed and the plan was created in a collaborative fashion.  Jayro's response to the current recommendations: engaged  - Please see the plan for pain management as documented above         DVT Prophylaxis: Eliquis  Code Status: Full Code    Disposition: Expected discharge pending surgical progress    Herb Zurita III, MD  [phone] (Cell)  [phone] (Pager)  Please call or text with any questions or concerns.        Physical Exam   Temp: 96.8  F (36  C) Temp src: Oral BP: 142/82   Heart Rate: 68 Resp: 16 SpO2: 96 % O2 Device: None (Room air)    Vitals:    09/06/18 1603 09/06/18 2053   Weight: 113.4 kg (250 lb) 115.1 kg (253 lb  11.2 oz)     Vital Signs with Ranges  Temp:  [96  F (35.6  C)-96.9  F (36.1  C)] 96.8  F (36  C)  Heart Rate:  [68-77] 68  Resp:  [16-18] 16  BP: (138-150)/(82-96) 142/82  SpO2:  [95 %-96 %] 96 %  I/O last 3 completed shifts:  In: 1197 [P.O.:480; I.V.:717]  Out: 1550 [Urine:1550]    Constitutional: Awake, alert, cooperative, no apparent distress  Respiratory: Clear to auscultation bilaterally, no crackles or wheezing  Cardiovascular: S1S2, well-perfused, no edema  GI: Normal bowel sounds, soft, non-distended, non-tender  Skin/Integumentary: No rashes or other lesions noted  Other:     Medications     - MEDICATION INSTRUCTIONS -       lactated ringers 50 mL/hr at 09/13/18 0527       acetaminophen  975 mg Oral Q8H     amLODIPine  5 mg Oral Daily     apixaban ANTICOAGULANT  5 mg Oral BID     cefTRIAXone  2 g Intravenous Q24H     doxylamine  12.5 mg Oral At Bedtime     gabapentin  300 mg Oral TID     insulin aspart  10 Units Subcutaneous TID w/meals     insulin aspart  1-10 Units Subcutaneous TID AC     insulin aspart  1-7 Units Subcutaneous At Bedtime     insulin glargine U-300  35 Units Subcutaneous QAM     isosorbide mononitrate  30 mg Oral Daily     levothyroxine  137 mcg Oral At Bedtime     lisinopril  20 mg Oral BID     melatonin  5 mg Oral At Bedtime     metoprolol succinate  50 mg Oral At Bedtime     pantoprazole  40 mg Oral QAM AC     senna-docusate  1 tablet Oral BID    Or     senna-docusate  2 tablet Oral BID     simvastatin  40 mg Oral At Bedtime     sodium chloride (PF)  10 mL Intracatheter Q8H     sodium chloride (PF)  10 mL Intracatheter Q8H     sodium chloride (PF)  3 mL Intracatheter Q8H       Data     Recent Labs  Lab 09/13/18  0759 09/13/18  0758 09/12/18  0725 09/10/18  0653 09/09/18  0542 09/08/18  0614 09/08/18  0002 09/07/18  0744 09/06/18  1542   WBC  --   --  7.4 5.1 4.3 5.0  --  8.3 11.4*   HGB 13.9  --  13.1* 11.9* 11.4* 12.1*  --  12.0* 13.9   MCV  --   --  87 87 86 88  --  88 87   PLT  --    --  235 186 156 148*  --  152 180   INR  --   --   --   --   --   --   --   --  1.36*   NA  --   --  139 139 138 138  --  137 135   POTASSIUM  --   --  4.3 3.4 3.4 3.5  --  3.8 3.7   CHLORIDE  --   --  105 104 104 105  --  105 102   CO2  --   --  27 28 28 27  --  23 24   BUN  --   --  11 11 12 18  --  16 12   CR  --   --  0.96 0.94 0.95 1.11  --  1.09 0.85   ANIONGAP  --   --  7 7 6 6  --  9 9   BETTIE  --   --  8.3* 8.6 8.2* 8.3*  --  7.8* 8.1*   GLC  --  123* 111* 126* 126* 158*  --  248* 214*   ALBUMIN  --   --   --   --   --   --   --  3.0* 3.5   PROTTOTAL  --   --   --   --   --   --   --  5.8* 6.4*   BILITOTAL  --   --   --   --   --   --   --  1.9* 2.3*   ALKPHOS  --   --   --   --   --   --   --  64 79   ALT  --   --   --   --   --   --   --  27 23   AST  --   --   --   --   --   --   --  14 12   TROPI  --   --   --   --   --  <0.015 <0.015  --   --        Imaging:  No results found for this or any previous visit (from the past 24 hour(s)).    -Data reviewed today: I reviewed all new labs and imaging results over the last 24 hours. I personally reviewed no images or EKG's today.

## 2018-09-13 NOTE — PLAN OF CARE
Problem: Patient Care Overview  Goal: Plan of Care/Patient Progress Review  Discharge Planner OT   Patient plan for discharge: Home  Current status: Order received, chart reviewed. Per discussion with PT, pt is requesting to discontinue therapy services. OT eval not completed per pt request.   Barriers to return to prior living situation: See PT notes for barriers  Recommendations for discharge: Defer recommendations to PT  Rationale for recommendations: Pt requesting to discontinue therapy orders.        Entered by: Sylvia Holm 09/13/2018 2:49 PM

## 2018-09-13 NOTE — PROGRESS NOTES
S:  POD # 2 s/p I & D 1st MTP plantar foot wound, left; and amputation 3rd toe distal phalanx right foot.  He reports he is doing well at this time.  He has requested to discontinue PT/OT services.  He feels able to ambulate with the post op shoes and a walker.  He plans to go to the outpatient infusion center to complete his IV antibiotics when he is discharged.  He saw cardiology today and a stress test has been recommended.  He is scheduled to have that tomorrow.    O: T: 96.5;  HR: 71;  R: 16;  BP: 154/85;  SpO2: 95    Patient is lying in bed with his legs on pillows, but they are not elevated at this time.  His dressings are clean, dry and intact.  He is able to wiggle his toes and move his legs.  Both calves are soft and nontender to palpation.  Sensation is at his baseline level, which is altered due to neuropathy.    A/P:  POD #2 s/p I & D 1st MTP plantar wound left foot and amputation 3rd toe distal phalanx right foot  Continue antibiotics per ID team recommendations  Wound consult ordered to evaluate for ongoing needs  Patient can be heel weightbearing in the post op shoes with a walker  Oral medication as needed for pain  From ortho perspective patient may discharge when cleared by cardiology and medicine teams    Flavio Baxter PA-C

## 2018-09-13 NOTE — PROGRESS NOTES
Per ID pt will need IV Rocephin for 1 week.  I sent referral to [facility] to check on coverage.  Will discuss options of home infusion vs outpatient infusion with pt after care rounds.  Will also discuss option of Home Care PT and RN support.  Denise RICE CTS [id number]    ADDENDUM 10:00: Pt is open to outpatient infusion, but he doesn't know if he will have transportation.  He would like to know the out of pocket cost for home infusion since he doesn't have coverage for home infusion.  I faxed the referral to [facility] [phone].  I also notified intake and left a vm for [facility] Liaison.      12:00 [place] home infusion would be $17.00 a week for drug and $105 a week for supplies TOTAL= $122.00.  Pt said he isn't interested in home infusion with cost.  He will do outpatient infusion and find transportation.  He declines home care PT or RN support.

## 2018-09-13 NOTE — PLAN OF CARE
Problem: Patient Care Overview  Goal: Plan of Care/Patient Progress Review  Outcome: Improving  Pt denies pain. Acording to tele tech, afib rates controlled in the 70's. Did have a 2 second pause at 0607. Md notified. Bandages clean/dry/intact to bilateral feet. Using orthotic shoes when ambulating to bathroom. Continues on IVF and IV abx.

## 2018-09-13 NOTE — PROGRESS NOTES
"CLINICAL NUTRITION SERVICES  -  ASSESSMENT NOTE    Malnutrition:   % Weight Loss:  None noted  % Intake:  No decreased intake noted  Subcutaneous Fat Loss:  None observed  Muscle Loss:  None observed  Fluid Retention:  None noted    Malnutrition Diagnosis: Patient does not meet two of the above criteria necessary for diagnosing malnutrition     REASON FOR ASSESSMENT  Jayro Elder is a 68 year old male seen by Registered Dietitian for LOS    NUTRITION HISTORY  - info obtained from patient, EMR  - [facility] afib, CAD post PCI, DM2 on insulin with neuropathy and lower extremity wounds, hypothyroidism, GERD and CVA. Undergoing management for diabetic ulcers, presents to hospital with generalized weakness/dizziness.   - Pt reports many previous diet/diabetes educations with certified diabetes educators and Endocrinologist. Currently follows with his endocrinologist on a regular basis. \"He just says to get it down\" (with regard to A1c). --> (unable to find any CDE chart notes, aside from a telephone encounter)   - currently he does not check his blood sugars because he is \"tired of sticking\" himself and only takes his prescribed insulin.   - at one point he and his wife had cut out all potatoes and pasta \"that didn't help much.. Other than some weight loss\". He is aware of high CHO foods.   - Currently eats two meals daily, lunch and a later dinner. He used to cook quite a bit but now that he cannot move much he has been relying on his wife.   - During visit Jayro appeared somewhat unmotivated/discouraged with regard to his health status - despite offers of encouragement and education regarding diet, patient appears to be in pre contemplation/contemplation stages of change       CURRENT NUTRITION ORDERS  Diet Order:     Moderate Consistent Carbohydrate     Current Intake/Tolerance:  Eating 100% most meals. No changes to appetite reported.    PHYSICAL FINDINGS  Observed  Wearing boot  Obtained from Chart/Interdisciplinary " "Team  POD#2 s/p I&D 1st MTP joint plantar ulcer left foot w/ amputation distal phalanx 3rd toe right foot (osteo and cellulitis)    ANTHROPOMETRICS  Height: 6' 4\"  Weight: 253 lbs 11.2 oz (115.1 kg)  Body mass index is 30.88 kg/(m^2).  Weight Status:  Obesity Grade I BMI 30-34.9  IBW: 91.8 kg  % IBW: 125%  Weight History: Weight has been trending up.   Wt Readings from Last 10 Encounters:   09/06/18 115.1 kg (253 lb 11.2 oz)   03/12/18 114.3 kg (252 lb)   03/03/18 109.6 kg (241 lb 9.6 oz)   10/25/17 112.9 kg (249 lb)   10/19/17 110.7 kg (244 lb)   06/23/17 106.6 kg (235 lb)   06/22/17 107.3 kg (236 lb 8 oz)   06/17/17 101.4 kg (223 lb 8.7 oz)   06/13/17 104.8 kg (231 lb)   06/09/17 107 kg (236 lb)         LABS  Labs reviewed  Lab Results   Component Value Date    A1C 9.4 09/06/2018    A1C 8.8 03/04/2018    A1C 11.7 04/22/2017    A1C 13.1 03/31/2017    A1C 10.5 12/02/2014       Recent Labs  Lab 09/13/18  0758 09/13/18  0744 09/13/18  0211 09/12/18  2112 09/12/18  1731 09/12/18  1225 09/12/18  0748 09/12/18  0725  09/10/18  0653  09/09/18  0542  09/08/18  0614  09/07/18  0744   *  --   --   --   --   --   --  111*  --  126*  --  126*  --  158*  --  248*   BGM  --  116* 102* 132* 100* 128* 100*  --   < >  --   < >  --   < >  --   < >  --    < > = values in this interval not displayed.      MEDICATIONS  Medications reviewed  HSSI  Home insulin: Toujeo    ASSESSED NUTRITION NEEDS PER APPROVED PRACTICE GUIDELINES:  Dosing Weight 97.6 kg - Adj DW  Estimated Energy Needs: 8444-5821 kcals (20-25 Kcal/Kg)  Justification: maintenance and overweight, healing  Estimated Protein Needs: 117-146+ grams protein (1.2-1.5 g pro/Kg)  Justification: post-op and wound healing  Estimated Fluid Needs: 1473-1949+ mL (1 mL/Kcal)  Justification: maintenance    MALNUTRITION:  % Weight Loss:  None noted  % Intake:  No decreased intake noted  Subcutaneous Fat Loss:  None observed  Muscle Loss:  None observed  Fluid Retention:  None " noted    Malnutrition Diagnosis: Patient does not meet two of the above criteria necessary for diagnosing malnutrition    NUTRITION DIAGNOSIS:  Increased nutrient needs (protein) related to post-op healing as evidenced by protein needs assessed at 1.2-1.5+ g/kg adjusted dosing weight.       NUTRITION INTERVENTIONS  Recommendations / Nutrition Prescription  Continue mod CHO diet, encourage protein       Implementation  Nutrition education: Nutrition Education:  Attempted to provide education regarding snack/breakfast suggestions, though pt reports various barriers to increasing number of meals eaten daily. Toward end of session patient appeared more agreeable to increased protein consumption/possibility of eating breakfast meal as long as it does not take much work.  Collaboration and Referral of Nutrition care: pt discussed during interdisciplinary rounds.       Nutrition Goals  Pt to tolerate at least 75% meals TID.       MONITORING AND EVALUATION:  Progress towards goals will be monitored and evaluated per protocol and Practice Guidelines        Jenni Gonzalez RD, LD  3rd floor/ICU: [phone]  All other floors: [phone]  Weekend/holiday: [phone]  Office: [phone]

## 2018-09-13 NOTE — PROGRESS NOTES
09/12/18 1642   Quick Adds   Type of Visit Initial PT Evaluation   Living Environment   Lives With spouse   Living Arrangements house   Home Accessibility stairs to enter home;stairs within home   Number of Stairs to Enter Home 2  (no railing)   Number of Stairs Within Home 12  (single railing)   Transportation Available family or friend will provide   Living Environment Comment Bedroom/bathroom upstairs, but pt plans to stay on main level upon d/c. Spouse works during the day.   Self-Care   Dominant Hand right   Usual Activity Tolerance moderate  (limited by B foot pain)   Current Activity Tolerance fair   Regular Exercise no   Equipment Currently Used at Home walker, rolling;cane, straight   Activity/Exercise/Self-Care Comment Limited activity tolerance at baseline due to B foot pain/infections.  Using SEC at times, 4WW occasionally.  Indep with ADLs.  Has a lift chair he uses.     Functional Level Prior   Ambulation 1-->assistive equipment   Transferring 0-->independent   Toileting 0-->independent   Bathing 0-->independent   Dressing 0-->independent   Eating 0-->independent   Communication 0-->understands/communicates without difficulty   Swallowing 0-->swallows foods/liquids without difficulty   Cognition 0 - no cognition issues reported   Fall history within last six months yes   Number of times patient has fallen within last six months 1   Which of the above functional risks had a recent onset or change? ambulation;transferring;toileting;fall history   Prior Functional Level Comment Reports he has some balance problems from previous CVA, limited walking/activity over past year due to foot problems.   General Information   Onset of Illness/Injury or Date of Surgery - Date 09/11/18   Referring Physician Miki Harp MD   Patient/Family Goals Statement pt plans to d/c home    Pertinent History of Current Problem (include personal factors and/or comorbidities that impact the POC) 68-year-old gentleman  with a past medical history of A. 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  Found to have cellulits and osteomyelitis.  POD #1 s/p I & D 1st MTP joint plantar ulcer left foot, amputation distal phalanx 3rd toe right foot.    Precautions/Limitations fall precautions   Weight-Bearing Status - LLE other (see comments)  (heel WB)   Weight-Bearing Status - RLE other (see comments)  (heel WB)   General Observations has L heel offloading shoe, R rigid flat sore post operative shoe   Cognitive Status Examination   Orientation orientation to person, place and time   Pain Assessment   Patient Currently in Pain No   Integumentary/Edema   Integumentary/Edema Comments dressings intact B feet, dry, no drains   Posture    Posture Forward head position   Range of Motion (ROM)   ROM Comment WFL proximally BLEs, able to demo APs B through functional range, B AROM shoulders > 90 deg   Strength   Strength Comments SLR indep B, at least 4/5 strength throughout BLEs, pt reports baseline RLE weakness compared to RLE due to previous CVA, roughtly symmetrical with brief MMT   Bed Mobility   Bed Mobility Comments SBA supine <> sit EOB   Transfer Skills   Transfer Comments Difficulty standing from EOB and chair, improved from elevated surface, has power lift chair at home he uses.  Min A from EOB to stand with FWW, heel WB BLE, but appears full WBAT foot flat RLE, B protective shoes donned.   Gait   Gait Comments Ambualtory at bedside with FWW and B protective shoes donned, heel WB, but appears full WBAT B, no pain reported.   Balance   Balance Comments Impaired balance at baseline due to previous CVA, worsened balance currently due to neuropathy, surgical intervention, and heel WB restiction with post op  "shoes.  Needing FWW for compensation.   Sensory Examination   Sensory Perception Comments baseline severe peripheral neuropathy, intact to light touch proximal to B ankles, but poor sensation distally.   Coordination   Coordination no deficits were identified   General Therapy Interventions   Planned Therapy Interventions balance training;gait training;neuromuscular re-education;strengthening;transfer training;risk factor education;home program guidelines;progressive activity/exercise   Clinical Impression   Criteria for Skilled Therapeutic Intervention yes, treatment indicated   PT Diagnosis Impaired functional mobility and gait   Influenced by the following impairments Heel WB restriction B, impaired balance, generalized weakness/deconditioning, wounds   Functional limitations due to impairments Impaired safety and tolerance with functional transfers and gait skills, stairs, fall risk, impaired wound site healing if unable to maintain WB restrictions   Clinical Presentation Stable/Uncomplicated   Clinical Presentation Rationale stable status post op, indep PLOF, age, PMH   Clinical Decision Making (Complexity) Low complexity   Therapy Frequency` daily   Predicted Duration of Therapy Intervention (days/wks) 3 days   Anticipated Equipment Needs at Discharge front wheeled walker  (order placed in chart)   Anticipated Discharge Disposition Home with Assist;Home with Home Therapy   Risk & Benefits of therapy have been explained Yes   Patient, Family & other staff in agreement with plan of care Yes   [facility] [employer] TM \"6 Clicks\"   2016, Trustees of [facility], under license to [employer].  All rights reserved.   6 Clicks Short Forms Basic Mobility Inpatient Short Form   [facility] [employer]-PAC  \"6 Clicks\" V.2 Basic Mobility Inpatient Short Form   1. Turning from your back to your side while in a flat bed without using bedrails? 3 - A Little   2. Moving from lying on your back to sitting on the side " of a flat bed without using bedrails? 3 - A Little   3. Moving to and from a bed to a chair (including a wheelchair)? 3 - A Little   4. Standing up from a chair using your arms (e.g., wheelchair, or bedside chair)? 3 - A Little   5. To walk in hospital room? 3 - A Little   6. Climbing 3-5 steps with a railing? 3 - A Little   Basic Mobility Raw Score (Score out of 24.Lower scores equate to lower levels of function) 18   Total Evaluation Time   Total Evaluation Time (Minutes) 15

## 2018-09-13 NOTE — PROGRESS NOTES
Discharge Planner   Discharge Plans in progress: yes  Barriers to discharge plan: limited home infusion coverage  Follow up plan: dc with outpatient infusion. Declines need for Home care PT or RN support       Entered by: Rosy Morrow 09/13/2018 12:02 PM

## 2018-09-13 NOTE — PLAN OF CARE
"Problem: Patient Care Overview  Goal: Plan of Care/Patient Progress Review    PT: Attempted to see pt this afternoon. Discussed heel WB restriction bilaterally and mobility modifications including recommendation for FWW to help optimize ability to offload his mid/forefoot B, RTS or commode to assist with improved ability to safely get on/off toilet.  Pt refuses all mobility training and recommendations, states \"It doesn't hurt, I'll be fine\". Refused any further mobility training or practice navigating stairs.  Pt requesting to discontinue all therapy services, refused FWW for home.  Will complete PT orders.    Physical Therapy Discharge Summary    Reason for therapy discharge:    Patient/family request discontinuation of services.    Progress towards therapy goal(s). See goals on Care Plan [facility] electronic [facility] record for goal details.  Goals not met.  Barriers to achieving goals:   limited tolerance for therapy. Seen for evaluation/treatment on 9/12: requiring CGA with mobility skills, difficulty maintaining heel WB restriction, unsteady at times.  Recommended obtaining FWW currently to help offload his feet, has 4WW but this will likely be less stable.    Therapy recommendation(s):    No further therapy is recommended.        "

## 2018-09-13 NOTE — CONSULTS
"Consult Date:  09/13/2018      CARDIOLOGY CONSULTATION      REQUESTING PHYSICIAN:  Davion Cordova PA-C       HISTORY OF PRESENT ILLNESS:  Mr. Elder is a pleasant 68-year-old male with a history of diabetes, peripheral neuropathy and osteomyelitis with amputation of the right third toe.  He also had a surgical intervention and status post irrigation and debridement of the left first metatarsal.  He has a history of chronic atrial fibrillation and previous stroke on Eliquis as well as multivessel coronary artery disease.  During his hospitalization and treatment for osteomyelitis, he complained of chest pain symptoms and therefore I have been consulted.        In talking with him, he states he has chronic what he describes as \"groaning in his chest.\"  This has been ongoing since admission.  I asked him if he had just mentioned this to his hospitalist, he said no he mentioned it on several occasions.  They did check troponins today and were normal.  An echocardiogram was done on admission and he does have an ischemic cardiomyopathy with an estimated ejection fraction of 35-40%.  This was unchanged from his previous echocardiogram from 04/2017.  In reviewing his chart, his last ischemic evaluation was via angiogram in June of last year.  He underwent a coronary angiogram for evaluation of chest pain symptoms.  He was found to have mild in-stent restenosis of the LAD.  No other disease and no intervention was performed.  He follows with Dr. Mckeon in clinic.  He is currently comfortable without symptoms, although in talking with him he says if he had known about the diagnosis of atrial fibrillation, it would have explained the groaning in his chest, which indicates that this has been ongoing since his diagnosis of atrial fibrillation.      PAST MEDICAL HISTORY:  Insulin-dependent diabetes with neuropathy, chronic diabetic foot ulcers with amputation of his third right toe and irrigation and debridement of his left " first metatarsal, hypertension, osteoarthritis, history of stroke, chronic atrial fibrillation on Eliquis, multivessel coronary disease with stent placements in 2009.  Most recent ischemic evaluation in 06/2017 indicated no flow-limiting disease, no intervention performed.  History of syncope, spinal surgery.      MEDICATIONS ON ADMISSION:  Please see H and P.      ALLERGIES:  NO KNOWN DRUG ALLERGIES.      FAMILY HISTORY:  Negative for premature coronary artery disease.      SOCIAL HISTORY:  Remote tobacco abuse.      REVIEW OF SYSTEMS:  Please see above.  He denies lightheadedness, dizziness or syncopal events.  I do note that he had a pausing on telemetry since admission as long as 3.0 seconds.  This one time of 3 seconds occurred at 3 a.m. during sleeping hours.  There was no other pausing greater than 2.6 seconds noted on tele.  No other arrhythmias with underlying chronic atrial fibrillation that does appear rate controlled.      PHYSICAL EXAMINATION:   VITAL SIGNS:  On physical exam, his blood pressure is hypertensive ranging from 133-157 systolic/81-91 diastolic, heart rates in the 60s-70s, respiratory rate 18.  He is oxygenating 95% or greater on room air.  He is afebrile.   GENERAL:  He is a 68-year-old male in no apparent distress, eupneic on exam with conversation.   HEENT:  Head is atraumatic, normocephalic.  Pupils are equal.  Dentition is poor.   NECK:  Supple.  I do not appreciate carotid bruits.   CARDIOVASCULAR:  Tones are irregular.  I do not appreciate a murmur, gallop or rub.   LUNGS:  Clear posteriorly.   ABDOMEN:  Soft.   EXTREMITIES:  Warm.     NEUROLOGIC:  He has no gross neurologic abnormalities.      DATA:  Electrolyte panel performed yesterday was within normal range.  Again, troponin is normal.      ASSESSMENT AND PLAN:  This is a 68-year-old male with a high-risk profile including insulin-dependent diabetes with neuropathy and diabetic foot ulcers with ischemic likely peripheral arterial  disease.  He is status post a right third toe amputation and irrigation and debridement of his left first metatarsal.  He was complaining of chest pain symptoms during his admission, but they do appear chronic.  There is no indication of acute coronary syndrome here.  His last ischemic evaluation was last  via angiogram and no intervention performed with no flow-limiting lesions.        Given his underlying high-risk features and known coronary artery disease, I would recommend stress testing given his recent symptoms.  This can be done inpatient or outpatient.  Since he is now somewhat immobile with his recent foot surgeries, it would be better to have it done here in the hospital and he can follow up with Dr. Mckeon with the results.      Please feel free to contact me with any questions you have in regards to his care.         MACHO CEJA DO             D: 2018   T: 2018   MT: NANCY      Name:     PORTER YANCEY   MRN:      [id number]        Account:       [id number]   :      1950           Consult Date:  2018      Document: [id number]

## 2018-09-13 NOTE — PROVIDER NOTIFICATION
"Admitting hospitalist paged \"Pt had 2.3 second pause. Had a pause this AM & yesterday. Thanks.\"  "

## 2018-09-13 NOTE — PLAN OF CARE
Problem: Patient Care Overview  Goal: Plan of Care/Patient Progress Review  Outcome: Improving  Pt up SBA w/ walker. Heel wt bearing & using ortho shoes. Alert and oriented x4. BP max 144/96. Denies pain. BLE CMS intact. MOD CHO diet, good appetite. Midline placed in left. Tele-controlled a-fib, HR 68 per tele tech. 2.3 second pause, see note. Receiving rocephin. Ortho, ID, PT & OT following.

## 2018-09-13 NOTE — PLAN OF CARE
Problem: Patient Care Overview  Goal: Plan of Care/Patient Progress Review  Ambulatory Status:  Pt up 1 assist with walker and gait belt. Up in chair for meals. Orthotic shoes on when OOB.  Orientation: a/o  VS:  VSS  Pain:  denies  Resp: LS clear.   GI:  Good appetite, on mod carb diet. Hypo BS.  Passing flatus.  Last BM 9/10.  :  Voiding without difficulty   Skin:  Ranjeet. Foot dressing CDI   Tx:  Rocephin  Labs:   and 136  Consults:  Ortho, OT, PT, ID and Cardiology   Disposition:  Possible dc tomorrow

## 2018-09-13 NOTE — PROGRESS NOTES
Therapy: IV rocephin   Insurance: Platinum [employer] Medicare replacement plan  Pt has Platinum Blue (a Medicare replacement plan), which does not cover IV ABX in the home. (Pt would have coverage for short term TCU or IC). Below is what pt would be responsible for if pt wanted to go w [facility]        Pt would have to self-pay for the per-cr (daily) and drug    If not homebound, nursing would also be self-pay (per visit)    Co-Insurance:   Max Out of Pocket: $  Met: $    Please contact Intake with any questions, [phone] or In Basket pool, [facility] ([phone]).  In reference to admission date 9/6/18 to check IV ABX coverage

## 2018-09-14 DIAGNOSIS — R78.81 BACTEREMIA: ICD-10-CM

## 2018-09-14 LAB
BACTERIA SPEC CULT: NO GROWTH
GLUCOSE BLDC GLUCOMTR-MCNC: 104 MG/DL (ref 70–99)
GLUCOSE BLDC GLUCOMTR-MCNC: 112 MG/DL (ref 70–99)
GLUCOSE BLDC GLUCOMTR-MCNC: 133 MG/DL (ref 70–99)
GLUCOSE BLDC GLUCOMTR-MCNC: 145 MG/DL (ref 70–99)
GLUCOSE BLDC GLUCOMTR-MCNC: 205 MG/DL (ref 70–99)
Lab: NORMAL
SPECIMEN SOURCE: NORMAL
TROPONIN I SERPL-MCNC: <0.015 UG/L (ref 0–0.04)

## 2018-09-14 PROCEDURE — 25000132 ZZH RX MED GY IP 250 OP 250 PS 637: Mod: GY | Performed by: INTERNAL MEDICINE

## 2018-09-14 PROCEDURE — 25000132 ZZH RX MED GY IP 250 OP 250 PS 637: Mod: GY | Performed by: ORTHOPAEDIC SURGERY

## 2018-09-14 PROCEDURE — 00000146 ZZHCL STATISTIC GLUCOSE BY METER IP

## 2018-09-14 PROCEDURE — A9270 NON-COVERED ITEM OR SERVICE: HCPCS | Mod: GY | Performed by: ORTHOPAEDIC SURGERY

## 2018-09-14 PROCEDURE — 84484 ASSAY OF TROPONIN QUANT: CPT | Performed by: INTERNAL MEDICINE

## 2018-09-14 PROCEDURE — G0463 HOSPITAL OUTPT CLINIC VISIT: HCPCS | Performed by: NURSE PRACTITIONER

## 2018-09-14 PROCEDURE — 25000128 H RX IP 250 OP 636: Performed by: ORTHOPAEDIC SURGERY

## 2018-09-14 PROCEDURE — A9270 NON-COVERED ITEM OR SERVICE: HCPCS | Mod: GY | Performed by: HOSPITALIST

## 2018-09-14 PROCEDURE — A9270 NON-COVERED ITEM OR SERVICE: HCPCS | Mod: GY | Performed by: INTERNAL MEDICINE

## 2018-09-14 PROCEDURE — 25000132 ZZH RX MED GY IP 250 OP 250 PS 637: Mod: GY | Performed by: HOSPITALIST

## 2018-09-14 PROCEDURE — 25000132 ZZH RX MED GY IP 250 OP 250 PS 637: Mod: GY | Performed by: PHYSICIAN ASSISTANT

## 2018-09-14 PROCEDURE — 40000901 ZZH STATISTIC WOC PT EDUCATION, 0-15 MIN: Performed by: NURSE PRACTITIONER

## 2018-09-14 PROCEDURE — A9270 NON-COVERED ITEM OR SERVICE: HCPCS | Mod: GY | Performed by: PHYSICIAN ASSISTANT

## 2018-09-14 PROCEDURE — 99232 SBSQ HOSP IP/OBS MODERATE 35: CPT | Performed by: INTERNAL MEDICINE

## 2018-09-14 PROCEDURE — 25000128 H RX IP 250 OP 636: Performed by: INTERNAL MEDICINE

## 2018-09-14 PROCEDURE — 36415 COLL VENOUS BLD VENIPUNCTURE: CPT | Performed by: INTERNAL MEDICINE

## 2018-09-14 PROCEDURE — 12000007 ZZH R&B INTERMEDIATE

## 2018-09-14 RX ORDER — CEFTRIAXONE SODIUM 2 G
2 VIAL (EA) INJECTION ONCE
Status: CANCELLED
Start: 2018-09-16 | End: 2018-09-16

## 2018-09-14 RX ADMIN — AMLODIPINE BESYLATE 5 MG: 5 TABLET ORAL at 08:07

## 2018-09-14 RX ADMIN — APIXABAN 5 MG: 5 TABLET, FILM COATED ORAL at 21:27

## 2018-09-14 RX ADMIN — ACETAMINOPHEN 650 MG: 325 TABLET, FILM COATED ORAL at 10:03

## 2018-09-14 RX ADMIN — SODIUM CHLORIDE, POTASSIUM CHLORIDE, SODIUM LACTATE AND CALCIUM CHLORIDE: 600; 310; 30; 20 INJECTION, SOLUTION INTRAVENOUS at 00:57

## 2018-09-14 RX ADMIN — METOPROLOL SUCCINATE 50 MG: 50 TABLET, EXTENDED RELEASE ORAL at 21:27

## 2018-09-14 RX ADMIN — APIXABAN 5 MG: 5 TABLET, FILM COATED ORAL at 08:06

## 2018-09-14 RX ADMIN — LISINOPRIL 20 MG: 20 TABLET ORAL at 08:07

## 2018-09-14 RX ADMIN — GABAPENTIN 300 MG: 300 CAPSULE ORAL at 08:06

## 2018-09-14 RX ADMIN — NITROGLYCERIN 0.4 MG: 0.4 TABLET SUBLINGUAL at 12:04

## 2018-09-14 RX ADMIN — LEVOTHYROXINE SODIUM 137 MCG: 25 TABLET ORAL at 21:27

## 2018-09-14 RX ADMIN — ISOSORBIDE MONONITRATE 30 MG: 30 TABLET, EXTENDED RELEASE ORAL at 08:06

## 2018-09-14 RX ADMIN — SENNOSIDES AND DOCUSATE SODIUM 2 TABLET: 8.6; 5 TABLET ORAL at 08:07

## 2018-09-14 RX ADMIN — LISINOPRIL 20 MG: 20 TABLET ORAL at 21:27

## 2018-09-14 RX ADMIN — SIMVASTATIN 40 MG: 40 TABLET, FILM COATED ORAL at 21:27

## 2018-09-14 RX ADMIN — ACETAMINOPHEN 975 MG: 325 TABLET, FILM COATED ORAL at 05:28

## 2018-09-14 RX ADMIN — CEFTRIAXONE SODIUM 2 G: 2 INJECTION, POWDER, FOR SOLUTION INTRAMUSCULAR; INTRAVENOUS at 16:15

## 2018-09-14 RX ADMIN — INSULIN ASPART 1 UNITS: 100 INJECTION, SOLUTION INTRAVENOUS; SUBCUTANEOUS at 21:32

## 2018-09-14 RX ADMIN — PANTOPRAZOLE SODIUM 40 MG: 40 TABLET, DELAYED RELEASE ORAL at 08:07

## 2018-09-14 RX ADMIN — Medication 5 MG: at 21:27

## 2018-09-14 ASSESSMENT — ACTIVITIES OF DAILY LIVING (ADL)
ADLS_ACUITY_SCORE: 9

## 2018-09-14 ASSESSMENT — PAIN DESCRIPTION - DESCRIPTORS
DESCRIPTORS: PRESSURE
DESCRIPTORS: OTHER (COMMENT)
DESCRIPTORS: PRESSURE

## 2018-09-14 NOTE — PLAN OF CARE
"Problem: Patient Care Overview  Goal: Plan of Care/Patient Progress Review  Outcome: Improving  Pt vitals stable. Was a little restless overnight, is anxious about his Lexiscan today. Has been getting up well with a standby assist and walker to ambulate to the bathroom. Tele tech reported Afib controlled rates in 70s, with \"a few pauses, all less than 3 seconds\". Pt denies chest pain, difficulty breathing, dizziness. Continues on IV fluids and abx.      "

## 2018-09-14 NOTE — PROVIDER NOTIFICATION
Web based paged Dr. Zurita regarding: Lexiscan was not able to be completed due to patient having caffeine, cannot be done until Monday.

## 2018-09-14 NOTE — PLAN OF CARE
Problem: Patient Care Overview  Goal: Plan of Care/Patient Progress Review  Outcome: Improving  Ambulatory Status:  Pt up 1 assist while wearing orthopedic boots and gait belt. Needs to stay off toes.   VS:  Vital signs:  Temp: 96.5  F (35.8  C) Temp src: Oral BP: (!) 163/92   Heart Rate: 70 Resp: 16 SpO2: 95 % O2 Device: None (Room air)  Pain:  No pain  Resp: LS clear  GI:  no nausea.  fair appetite and on mod carb diet.  BS active.  Passing flatus.  Last BM few days, PRN stool softener and scheduled given per MAR.  :  Voiding well  Skin:  Dressings on feet are CDI  Tx:  IV rocefin in midline   Labs:  BS 99 for dinner and 143 at HS  Consults:  WOC/ID/ortho/cardiology   Disposition:  Home with clinic infusion (needs to be set up by AM nurse)

## 2018-09-14 NOTE — PLAN OF CARE
Problem: Patient Care Overview  Goal: Plan of Care/Patient Progress Review  Outcome: Improving  Patient alert and oriented. Consistent carb diet with good appetite. Blood glucose 104, 133. Pain managed with acetaminophen. Nitroglycerin given x1 for  Chest pain radiating to neck and jaw, chest tightness making it difficult to breath, nitroglycerin effective. Assist of 1 for mobility with walker and gait belt, weight bearing on heals. Cardiology,ortho, ID,and WOC consults. WOC changed dressing to BLE. Updated surgery that they needed to see wounds per WOC. Lexiscan to be completed on Monday due to caffeine intake, provided education.Paitent set to discharge Monday. IV fluids running. Tele a fib controlled with PVC 60-80. Will continue to monitor.

## 2018-09-14 NOTE — PROGRESS NOTES
[facility]    Infectious Disease Progress Note    Date of Service (when I saw the patient): 09/14/2018     Assessment & Plan   Jayro Elder is a 68 year old male who was admitted on 9/6/2018.     INFECTIOUS IMPRESSION:   1.  A 68-year-old diabetic male with acute nausea, low-grade fever and probable right third toe and foot infection.  Cultures from foot, MSSA and Group B strep, blood cultures Group B strep.    2.  Chronic left first metatarsal ulcer, Chronic right foot ulcer, now worsening. S/P I and d of the left great toe and amputation 3rd toe in the right.   4.  Diabetes mellitus with neuropathy.   5.  Prior cerebrovascular infarction.   6.  Coronary artery disease.   7.  History of atrial fibrillation.  8 intermittent complaints of chest pain, cardiac work up underway.       RECOMMENDATIONS:   1. S/p I &D ulcer left great toe Amputation 3rd toe right foot at DIP level  Given group B bacteremia will recommend 2 weeks total of IV antibiotics, 9 days done in the hospital if ready for discharge other wise ok to go on 5 more days of once a day ceftriaxone. Orders are in the chart.     Lexie Shelton MD    Interval History   Afebrile.     Physical Exam   Temp: 96.4  F (35.8  C) Temp src: Oral BP: (!) 142/94   Heart Rate: 71 Resp: 18 SpO2: 96 % O2 Device: None (Room air)    Vitals:    09/06/18 1603 09/06/18 2053   Weight: 113.4 kg (250 lb) 115.1 kg (253 lb 11.2 oz)     Vital Signs with Ranges  Temp:  [95.9  F (35.5  C)-96.5  F (35.8  C)] 96.4  F (35.8  C)  Heart Rate:  [65-76] 71  Resp:  [16-18] 18  BP: (142-163)/(85-99) 142/94  SpO2:  [94 %-96 %] 96 %    Constitutional: Awake, alert, cooperative, no apparent distress  Lungs: Clear to auscultation bilaterally, no crackles or wheezing  Cardiovascular: Regular rate and rhythm, normal S1 and S2, and no murmur noted  Abdomen: Normal bowel sounds, soft, non-distended, non-tender  Skin: No rashes, no cyanosis, no edema  Other:    Medications     -  MEDICATION INSTRUCTIONS -       lactated ringers 50 mL/hr at 09/14/18 0057       amLODIPine  5 mg Oral Daily     apixaban ANTICOAGULANT  5 mg Oral BID     cefTRIAXone  2 g Intravenous Q24H     doxylamine  12.5 mg Oral At Bedtime     insulin aspart  10 Units Subcutaneous TID w/meals     insulin aspart  1-10 Units Subcutaneous TID AC     insulin aspart  1-7 Units Subcutaneous At Bedtime     insulin glargine U-300  35 Units Subcutaneous QAM     isosorbide mononitrate  30 mg Oral Daily     levothyroxine  137 mcg Oral At Bedtime     lisinopril  20 mg Oral BID     melatonin  5 mg Oral At Bedtime     metoprolol succinate  50 mg Oral At Bedtime     pantoprazole  40 mg Oral QAM AC     senna-docusate  1 tablet Oral BID    Or     senna-docusate  2 tablet Oral BID     simvastatin  40 mg Oral At Bedtime     sodium chloride (PF)  10 mL Intracatheter Q8H     sodium chloride (PF)  10 mL Intracatheter Q8H     sodium chloride (PF)  3 mL Intracatheter Q8H       Data   All microbiology laboratory data reviewed.  Recent Labs   Lab Test  09/13/18   0759  09/12/18   0725  09/10/18   0653  09/09/18   0542   WBC   --   7.4  5.1  4.3   HGB  13.9  13.1*  11.9*  11.4*   HCT   --   38.2*  35.4*  33.6*   MCV   --   87  87  86   PLT   --   235  186  156     Recent Labs   Lab Test  09/12/18   0725  09/10/18   0653  09/09/18   0542   CR  0.96  0.94  0.95     Recent Labs   Lab Test  09/08/18   0614   SED  15     Recent Labs   Lab Test  09/09/18   1533  09/08/18   0613  09/06/18   1550  09/06/18   1545  09/06/18   1541  03/03/18   1936  03/03/18   1843   CULT  No growth after 5 days  No growth  Cultured on the 1st day of incubation:  Streptococcus agalactiae sero group B  *  Critical Value/Significant Value, preliminary result only, called to and read back by  Teresa John RN on RHMS5 @1055 on 9/7/18. ch.    (Note)  POSITIVE for STREPTOCOCCUS AGALACTIAE (Group B Strep) by Paice  multiplex nucleic acid test. Penicillin and ampicillin are drugs  of  choice, nonsusceptible isolates have not been reported. Final  identification and antimicrobial susceptibility testing will be  verified by standard methods.    Specimen tested with WeHausigene multiplex, gram-positive blood culture  nucleic acid test for the following targets: Staph aureus, Staph  epidermidis, Staph lugdunensis, other Staph species, Enterococcus  faecalis, Enterococcus faecium, Streptococcus species, S. agalactiae,  S. anginosus grp., S. pneumoniae, S. pyogenes, Listeria sp., mecA  (methicillin resistance) and Elisha/B (vancomycin resistance).    Critical Value/Significant Value called to and read back by Teresa John RN on 9.7.18 at 1340. bw      Cultured on the 1st day of incubation:  Streptococcus agalactiae sero group B  Susceptibility testing done on previous specimen  *  Critical Value/Significant Value, preliminary result only, called to and read back by  Ofe Fernandes RN on RHMS5 @7516 on 9/7/18. ch.    Heavy growth  Staphylococcus aureus  *  Heavy growth  Beta hemolytic Streptococcus group B  *  Moderate growth  Normal skin michael    No growth  No growth  No growth

## 2018-09-14 NOTE — PROGRESS NOTES
lexiscan cancelled today due to caffeine intake  Can be done as outpatient, if he has done on Monday, will have cardiology follow up on results.

## 2018-09-14 NOTE — PROGRESS NOTES
[facility] Nurse Inpatient Wound Assessment     Assessment of wound(s) on pt's:   Left 1st MTP and right 3rd toe.        Data:   Patient History:     Per MD note, patient is a 68 year old male with history of A. fib on Eliquis, CAD status post PCI, type 2 diabetes on insulin with neuropathy and lower extremity wounds, hypothyroidism, GERD and CVA. He has been followed at [facility] for wound management. He was admitted here for generalized weakness and dizziness. On , had I &D 1st MTP  left foot and amputation of right 3rd toe .                    Moderate CHO diet.           Wilmer Assessment and sub scores:   Wilmer Score  Av.5  Min: 17  Max: 20     Labs:         Recent Labs   Lab Test  18   0759  18   0725   18   0614  18   0744  18   1542   ALBUMIN   --    --    --    --   3.0*  3.5   HGB  13.9  13.1*   < >  12.1*  12.0*  13.9   RBC   --   4.39*   < >  4.11*  4.02*  4.69   WBC   --   7.4   < >  5.0  8.3  11.4*   PLT   --   235   < >  148*  152  180   INR   --    --    --    --    --   1.36*   A1C   --    --    --    --    --   9.4*   CRP   --    --    --   81.6*  103.0*   --     < > = values in this interval not displayed.          Wound Assessment (location #1 Left  First toe medial):    Wound History:  Pt poor historian. Has been followed by [facility].    Specific Dimensions (length x width x depth, in cm):   1.5 x 1.5 x 0.2 cm    Tunneling:  N/A      Undermining: N/A    Wound Base: moist and  Red tissue    Palpation of the wound bed:  firm    Slough appearance:  none         Eschar appearance:  none    Periwound Skin: fragile      Color: normal and consistent with surrounding tissue    Temperature  normal     Drainage:  Scant amount serosanguinous    Odor: none    Pain:  absent     Wound Assessment (location #2 right 3rd toe amputation site):    Wound History: Followed by [facility]. Amputation  at [place]  [facility].    Specific Dimensions sutures intact, scant serous drainage, no odor. Skin slightly puffy, purple. Normal temperature.        Pain:  none         Intervention:     Patient's chart evaluated.      Wounds were  assessed    Wound Care: was done:  Vaseline gauze, gauze 4x4, kerlix wrap, secured with Ace bandages.    Orders  Written    Supplies floor stock, gathered    Discussed plan of care with Patient and Nurse          Assessment:       Pt with chronic DM and foot ulcers. Discussed daily dressings at home, pt shrugged \"I don't want to do that\" and seemed unconcerned for risk of possible infection. Discussed to have wife change dressings daily, nurse stated that wife will be given orders. States Hgb A1c is \"about 9.\" He is followed at [facility], strongly encouraged him to make follow-up apt now for apt as soon as possible after discharge.    I requested that Ortho assess open areas before discharge, nurse relayed this to them.        Plan:     Nursing to notify the Provider(s) and re-consult the [facility] Nurse if wounds deteriorate or if the wound care plan needs reevaluation.    Plan of care for wound located on Left 1st MTP and 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 Could also use soap and water after discharge. Apply Vaseline gauze, dry 4x4 gauze, Kerlix wrap. Secure with Ace bandage. Change once daily.    [facility] Nurse will return: 5-7 days     Face to face time: 16-30 Minutes       "

## 2018-09-14 NOTE — PROVIDER NOTIFICATION
Web based paged Dr. Zurita regarding: Complaining of chest pain radiating to neck and jaw, pressure, tightness in chest- making it difficult to breath and aching. Gave nitro x1, effective. New orders? Per NST who spoke with Dr. Zurita, no changes since nitroglycerin was given.

## 2018-09-15 LAB
ANION GAP SERPL CALCULATED.3IONS-SCNC: 8 MMOL/L (ref 3–14)
BACTERIA SPEC CULT: NO GROWTH
BUN SERPL-MCNC: 11 MG/DL (ref 7–30)
CALCIUM SERPL-MCNC: 8.6 MG/DL (ref 8.5–10.1)
CHLORIDE SERPL-SCNC: 106 MMOL/L (ref 94–109)
CO2 SERPL-SCNC: 25 MMOL/L (ref 20–32)
CREAT SERPL-MCNC: 0.86 MG/DL (ref 0.66–1.25)
ERYTHROCYTE [DISTWIDTH] IN BLOOD BY AUTOMATED COUNT: 13.1 % (ref 10–15)
GFR SERPL CREATININE-BSD FRML MDRD: 89 ML/MIN/1.7M2
GLUCOSE BLDC GLUCOMTR-MCNC: 115 MG/DL (ref 70–99)
GLUCOSE BLDC GLUCOMTR-MCNC: 120 MG/DL (ref 70–99)
GLUCOSE BLDC GLUCOMTR-MCNC: 123 MG/DL (ref 70–99)
GLUCOSE BLDC GLUCOMTR-MCNC: 139 MG/DL (ref 70–99)
GLUCOSE BLDC GLUCOMTR-MCNC: 206 MG/DL (ref 70–99)
GLUCOSE BLDC GLUCOMTR-MCNC: 88 MG/DL (ref 70–99)
GLUCOSE SERPL-MCNC: 160 MG/DL (ref 70–99)
HCT VFR BLD AUTO: 40.6 % (ref 40–53)
HGB BLD-MCNC: 13.5 G/DL (ref 13.3–17.7)
Lab: NORMAL
MCH RBC QN AUTO: 29.5 PG (ref 26.5–33)
MCHC RBC AUTO-ENTMCNC: 33.3 G/DL (ref 31.5–36.5)
MCV RBC AUTO: 89 FL (ref 78–100)
PLATELET # BLD AUTO: 270 10E9/L (ref 150–450)
POTASSIUM SERPL-SCNC: 4.1 MMOL/L (ref 3.4–5.3)
RBC # BLD AUTO: 4.58 10E12/L (ref 4.4–5.9)
SODIUM SERPL-SCNC: 139 MMOL/L (ref 133–144)
SPECIMEN SOURCE: NORMAL
TROPONIN I SERPL-MCNC: <0.015 UG/L (ref 0–0.04)
WBC # BLD AUTO: 6 10E9/L (ref 4–11)

## 2018-09-15 PROCEDURE — 25000132 ZZH RX MED GY IP 250 OP 250 PS 637: Mod: GY | Performed by: INTERNAL MEDICINE

## 2018-09-15 PROCEDURE — A9270 NON-COVERED ITEM OR SERVICE: HCPCS | Mod: GY | Performed by: INTERNAL MEDICINE

## 2018-09-15 PROCEDURE — 0Y6T0Z3 DETACHMENT AT RIGHT 3RD TOE, LOW, OPEN APPROACH: ICD-10-PCS | Performed by: ORTHOPAEDIC SURGERY

## 2018-09-15 PROCEDURE — 80048 BASIC METABOLIC PNL TOTAL CA: CPT | Performed by: INTERNAL MEDICINE

## 2018-09-15 PROCEDURE — 36415 COLL VENOUS BLD VENIPUNCTURE: CPT | Performed by: INTERNAL MEDICINE

## 2018-09-15 PROCEDURE — 25000128 H RX IP 250 OP 636: Performed by: INTERNAL MEDICINE

## 2018-09-15 PROCEDURE — 99207 ZZC CDG-MDM COMPONENT: MEETS LOW - DOWN CODED: CPT | Performed by: INTERNAL MEDICINE

## 2018-09-15 PROCEDURE — 99232 SBSQ HOSP IP/OBS MODERATE 35: CPT | Performed by: INTERNAL MEDICINE

## 2018-09-15 PROCEDURE — 85027 COMPLETE CBC AUTOMATED: CPT | Performed by: INTERNAL MEDICINE

## 2018-09-15 PROCEDURE — 25000132 ZZH RX MED GY IP 250 OP 250 PS 637: Mod: GY | Performed by: HOSPITALIST

## 2018-09-15 PROCEDURE — 00000146 ZZHCL STATISTIC GLUCOSE BY METER IP

## 2018-09-15 PROCEDURE — 12000007 ZZH R&B INTERMEDIATE

## 2018-09-15 PROCEDURE — A9270 NON-COVERED ITEM OR SERVICE: HCPCS | Mod: GY | Performed by: HOSPITALIST

## 2018-09-15 PROCEDURE — 0JBR0ZZ EXCISION OF LEFT FOOT SUBCUTANEOUS TISSUE AND FASCIA, OPEN APPROACH: ICD-10-PCS | Performed by: ORTHOPAEDIC SURGERY

## 2018-09-15 PROCEDURE — A9270 NON-COVERED ITEM OR SERVICE: HCPCS | Mod: GY | Performed by: ORTHOPAEDIC SURGERY

## 2018-09-15 PROCEDURE — 25000128 H RX IP 250 OP 636: Performed by: ORTHOPAEDIC SURGERY

## 2018-09-15 PROCEDURE — 84484 ASSAY OF TROPONIN QUANT: CPT | Performed by: INTERNAL MEDICINE

## 2018-09-15 PROCEDURE — 25000132 ZZH RX MED GY IP 250 OP 250 PS 637: Mod: GY | Performed by: ORTHOPAEDIC SURGERY

## 2018-09-15 PROCEDURE — 25000132 ZZH RX MED GY IP 250 OP 250 PS 637: Mod: GY | Performed by: PHYSICIAN ASSISTANT

## 2018-09-15 PROCEDURE — A9270 NON-COVERED ITEM OR SERVICE: HCPCS | Mod: GY | Performed by: PHYSICIAN ASSISTANT

## 2018-09-15 RX ORDER — CLOPIDOGREL BISULFATE 75 MG/1
75 TABLET ORAL DAILY
Status: DISCONTINUED | OUTPATIENT
Start: 2018-09-15 | End: 2018-09-15

## 2018-09-15 RX ORDER — HYDRALAZINE HYDROCHLORIDE 20 MG/ML
10 INJECTION INTRAMUSCULAR; INTRAVENOUS EVERY 4 HOURS PRN
Status: DISCONTINUED | OUTPATIENT
Start: 2018-09-15 | End: 2018-09-17 | Stop reason: HOSPADM

## 2018-09-15 RX ADMIN — CEFTRIAXONE SODIUM 2 G: 2 INJECTION, POWDER, FOR SOLUTION INTRAMUSCULAR; INTRAVENOUS at 15:52

## 2018-09-15 RX ADMIN — LISINOPRIL 20 MG: 20 TABLET ORAL at 08:13

## 2018-09-15 RX ADMIN — AMLODIPINE BESYLATE 5 MG: 5 TABLET ORAL at 08:13

## 2018-09-15 RX ADMIN — ISOSORBIDE MONONITRATE 30 MG: 30 TABLET, EXTENDED RELEASE ORAL at 08:12

## 2018-09-15 RX ADMIN — APIXABAN 5 MG: 5 TABLET, FILM COATED ORAL at 08:12

## 2018-09-15 RX ADMIN — APIXABAN 5 MG: 5 TABLET, FILM COATED ORAL at 21:34

## 2018-09-15 RX ADMIN — PANTOPRAZOLE SODIUM 40 MG: 40 TABLET, DELAYED RELEASE ORAL at 08:13

## 2018-09-15 RX ADMIN — METOPROLOL SUCCINATE 50 MG: 50 TABLET, EXTENDED RELEASE ORAL at 21:34

## 2018-09-15 RX ADMIN — LISINOPRIL 20 MG: 20 TABLET ORAL at 20:32

## 2018-09-15 RX ADMIN — HYDRALAZINE HYDROCHLORIDE 10 MG: 20 INJECTION INTRAMUSCULAR; INTRAVENOUS at 23:20

## 2018-09-15 RX ADMIN — ACETAMINOPHEN 650 MG: 325 TABLET, FILM COATED ORAL at 15:57

## 2018-09-15 RX ADMIN — Medication 5 MG: at 21:34

## 2018-09-15 RX ADMIN — LEVOTHYROXINE SODIUM 137 MCG: 25 TABLET ORAL at 21:34

## 2018-09-15 RX ADMIN — SIMVASTATIN 40 MG: 40 TABLET, FILM COATED ORAL at 21:34

## 2018-09-15 RX ADMIN — SODIUM CHLORIDE, POTASSIUM CHLORIDE, SODIUM LACTATE AND CALCIUM CHLORIDE: 600; 310; 30; 20 INJECTION, SOLUTION INTRAVENOUS at 12:40

## 2018-09-15 RX ADMIN — SENNOSIDES AND DOCUSATE SODIUM 1 TABLET: 8.6; 5 TABLET ORAL at 08:14

## 2018-09-15 RX ADMIN — ACETAMINOPHEN 650 MG: 325 TABLET, FILM COATED ORAL at 20:24

## 2018-09-15 RX ADMIN — ACETAMINOPHEN 650 MG: 325 TABLET, FILM COATED ORAL at 11:02

## 2018-09-15 ASSESSMENT — ACTIVITIES OF DAILY LIVING (ADL)
ADLS_ACUITY_SCORE: 10
ADLS_ACUITY_SCORE: 9
ADLS_ACUITY_SCORE: 10

## 2018-09-15 ASSESSMENT — PAIN DESCRIPTION - DESCRIPTORS
DESCRIPTORS: HEADACHE
DESCRIPTORS: HEADACHE

## 2018-09-15 NOTE — PROGRESS NOTES
S: POD #3 s/p I & D left 1st MTP plantar wound, amputation 3rd toe distal phalanx, right foot.  Patient reports he is doing ok at this time.  He is scheduled to be here until Monday due to a delay in having his stress test.  He reports no concerns about the feet, but states the wound care nurse was concerned they are draining.  He feels he is doing better with his new insulin regimen at controlling his blood sugars.    O:  Patient is lying in bed with his legs out in front of him, he is in no acute distress.  His dressings are clean, dry and intact.  When I remove the ace bandage, there is evidence of drainage on the left foot.  The left foot wound looks pretty good overall.  There is no surrounding erythema, warmth or active drainage.  This was re-dressed with a dry dressing.    The patient's right 3rd toe is more worrisome.  His toe is red, almost to the base of the proximal phalanx, it is not really blanchable.  It is not warm to touch.  The sutures are intact, no active drainage is noted, but again, there is evidence of that on the dressing.    A/P:  POD #3 s/p I & D 1st MTP plantar wound, left foot; amputation 3rd toe distal phalanx right foot.  Left foot looks good, right 3rd toe is worrisome and may require further amputation, will continue to monitor appearance/symptoms--if this becomes necrotic or patient develops additional symptoms surgical intervention would be discussed  Antibiotics per ID recommendations  Daily dressing changes for both wounds  Appreciate wound care nurse input and recommendations  Oral medication as needed for pain  Medicine team managing comorbidities    Flavio Baxter PA-C

## 2018-09-15 NOTE — PROVIDER NOTIFICATION
Web based paged Dr. John regarding: refused Plavix, stating he will not stay here longer if surgery is needed and need to wait to stop Plavix.

## 2018-09-15 NOTE — PLAN OF CARE
Problem: Patient Care Overview  Goal: Plan of Care/Patient Progress Review  Outcome: No Change  Pt hospitalized following R toe amputee and foot ulcer on L foot. R toe non-blanchable. Dressing changed this shift per ortho. No complaints of pain. Mod carb diet, tolerating well.  and 205. Tele in place, A-fib in 60s this shift. Hopefull tracee-scan on Monday.

## 2018-09-15 NOTE — PLAN OF CARE
Problem: Patient Care Overview  Goal: Plan of Care/Patient Progress Review  Outcome: No Change  Patient alert and oriented. Headache managed with tylenol x1. VSS. A1 for mobility with walker and gait belt. IVF running, IV rocephin. Showered this am. Tele a fib 75. , 115. Mod cho diet with good appetite. Diminished lung sounds, encouraging IS. Dressing change not completing, waiting ortho to see; CDI. Patient refused Norvasc, would like surgery input.

## 2018-09-15 NOTE — PLAN OF CARE
Problem: Patient Care Overview  Goal: Plan of Care/Patient Progress Review  Outcome: No Change  Pt POD 4. BP continues to be hypertensive at times, otherwise VSS. Denies pain. On tele. Tx; IV rocephin. Bilateral foot dressings CDI. . Midline infusing. Slept on/off throughout night, uneventful. Discharge TBD.

## 2018-09-15 NOTE — PROGRESS NOTES
[facility]    Hospitalist Progress Note    Date of Service: 09/15/2018    Interval History   Denies symptoms, denies any chest pain shortness of breath or dizziness, waiting for a stress test on Monday as planned        Assessment & Plan   This is a pleasant 68-year-old gentleman with a past medical history of A. 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  He is noted to have a left diabetic foot ulcer as well as a right third toe cellulitis.  He believes he had upcoming amputation plans but the patient seemed confused about these.  He was admitted to medicine with orthopedics consultation.  He underwent MRI of his left and right feet and was found to have osteomyelitis of his right third toe.  Blood cultures a and wound cultures re growing strep agalactiae. He is s/p 9/11/18  I& D 1st MTP joint plantar ulcer left foot, amputation distal phalanx 3rd toe right foot      Chest pain equivalent  -Though atypical, he is certainly high risk with previous PCI, significant diabetes with neuropathy and vasculopathy  -Appreciate cardiology consultation, recommending evaluation with a stress test prior to discharge, unfortunately drink caffeine yesterday  -Lexiscan stress test on Monday    Pauses on Telemetry  Atrial fibrillation  History of CVA  -Held Eliquis for surgery, restarted after  -Was having 2.5-3 second pauses frequently on telemetry on the evening of 9/11-9/12.  Dr. Zurita reduced his Toprol from 100 mg to 50 mg and he is having fewer pauses though they do persist  -Continue to monitor on telemetry for now, if he keeps having significant pauses he may need further metoprolol reduction    Strep agalactiae bacteremia  Right third toe cellulitis and osteomyelitis  -TTE  negative  -Nontoxic  -s/p I&D of left foot ulcer and distal right 3rd phalanx amputation   -Appreciate orthopedics and ID consultation  -Seen and fitted by Orthotist  -He will do outpatient infusion of ceftriaxone to complete his course, he will be done with this course next Wednesday, 14 days recommended by infectious disease  -Has declined home services, he will come as outpatient he does have a PICC line for his infusion after discharge    Chronic Systolic CHF  -Euvolemic  -EF 35% by echo this visit, stable  -Continue ACE/BB    CAD status post PCI  -Most recently stented June 2017  -On Eliquis/Plavix, held for OR, Eliquis resumed now, she did refuse to take Plavix because he might have more surgeries on his foot he tells me  BP control, statin    Type 2 diabetes with neuropathy on insulin  -Changed to Toujeo 35 units every morning plus NovoLog 10 TIDAC based on some low blood sugars in the hospital, better now      Hypothyroidism-Synthroid    GERD-Protonix      Discussed with: Patient's RN     DVT Prophylaxis: Eliquis  Code Status: Full Code    Disposition: Expected discharge Monday if stress test is negative    Timi John MD        Physical Exam   Temp: 97.8  F (36.6  C) Temp src: Oral BP: 156/90 Pulse: 73 Heart Rate: 74 Resp: 18 SpO2: 95 % O2 Device: None (Room air)    Vitals:    09/06/18 1603 09/06/18 2053   Weight: 113.4 kg (250 lb) 115.1 kg (253 lb 11.2 oz)     Vital Signs with Ranges  Temp:  [96.2  F (35.7  C)-97.8  F (36.6  C)] 97.8  F (36.6  C)  Pulse:  [73] 73  Heart Rate:  [72-74] 74  Resp:  [16-20] 18  BP: (148-170)/() 156/90  SpO2:  [95 %-97 %] 95 %  I/O last 3 completed shifts:  In: 1755 [P.O.:720; I.V.:1035]  Out: -     Constitutional: Awake, alert, cooperative, no apparent distress  Respiratory: Clear to auscultation bilaterally, no crackles or wheezing  Cardiovascular: S1S2, well-perfused, no edema  GI: Normal bowel sounds, soft, non-distended, non-tender  Skin/Integumentary: Both  feet are wrapped, dressing are clean, bilateral neuropathy      Medications     - MEDICATION INSTRUCTIONS -       lactated ringers 50 mL/hr at 09/15/18 1240       amLODIPine  5 mg Oral Daily     apixaban ANTICOAGULANT  5 mg Oral BID     cefTRIAXone  2 g Intravenous Q24H     doxylamine  12.5 mg Oral At Bedtime     insulin aspart  10 Units Subcutaneous TID w/meals     insulin aspart  1-10 Units Subcutaneous TID AC     insulin aspart  1-7 Units Subcutaneous At Bedtime     insulin glargine U-300  35 Units Subcutaneous QAM     isosorbide mononitrate  30 mg Oral Daily     levothyroxine  137 mcg Oral At Bedtime     lisinopril  20 mg Oral BID     melatonin  5 mg Oral At Bedtime     metoprolol succinate  50 mg Oral At Bedtime     pantoprazole  40 mg Oral QAM AC     senna-docusate  1 tablet Oral BID    Or     senna-docusate  2 tablet Oral BID     simvastatin  40 mg Oral At Bedtime     sodium chloride (PF)  10 mL Intracatheter Q8H       Data     Recent Labs  Lab 09/15/18  0812 09/14/18  1415 09/13/18  1311 09/13/18  0759 09/13/18  0758 09/12/18  0725 09/10/18  0653   WBC 6.0  --   --   --   --  7.4 5.1   HGB 13.5  --   --  13.9  --  13.1* 11.9*   MCV 89  --   --   --   --  87 87     --   --   --   --  235 186     --   --   --   --  139 139   POTASSIUM 4.1  --   --   --   --  4.3 3.4   CHLORIDE 106  --   --   --   --  105 104   CO2 25  --   --   --   --  27 28   BUN 11  --   --   --   --  11 11   CR 0.86  --   --   --   --  0.96 0.94   ANIONGAP 8  --   --   --   --  7 7   BETTIE 8.6  --   --   --   --  8.3* 8.6   *  --   --   --  123* 111* 126*   TROPI <0.015 <0.015 <0.015  --   --   --   --        -Data reviewed today: I reviewed all new labs and imaging results over the last 24 hours. I personally reviewed no images or EKG's today.

## 2018-09-16 LAB
GLUCOSE BLDC GLUCOMTR-MCNC: 102 MG/DL (ref 70–99)
GLUCOSE BLDC GLUCOMTR-MCNC: 137 MG/DL (ref 70–99)
GLUCOSE BLDC GLUCOMTR-MCNC: 143 MG/DL (ref 70–99)
GLUCOSE BLDC GLUCOMTR-MCNC: 77 MG/DL (ref 70–99)
GLUCOSE BLDC GLUCOMTR-MCNC: 98 MG/DL (ref 70–99)
GLUCOSE BLDC GLUCOMTR-MCNC: 98 MG/DL (ref 70–99)

## 2018-09-16 PROCEDURE — 25000132 ZZH RX MED GY IP 250 OP 250 PS 637: Mod: GY | Performed by: INTERNAL MEDICINE

## 2018-09-16 PROCEDURE — 99232 SBSQ HOSP IP/OBS MODERATE 35: CPT | Performed by: INTERNAL MEDICINE

## 2018-09-16 PROCEDURE — 25000128 H RX IP 250 OP 636: Performed by: INTERNAL MEDICINE

## 2018-09-16 PROCEDURE — 12000007 ZZH R&B INTERMEDIATE

## 2018-09-16 PROCEDURE — 25000132 ZZH RX MED GY IP 250 OP 250 PS 637: Mod: GY | Performed by: HOSPITALIST

## 2018-09-16 PROCEDURE — 25000132 ZZH RX MED GY IP 250 OP 250 PS 637: Mod: GY | Performed by: PHYSICIAN ASSISTANT

## 2018-09-16 PROCEDURE — A9270 NON-COVERED ITEM OR SERVICE: HCPCS | Mod: GY | Performed by: ORTHOPAEDIC SURGERY

## 2018-09-16 PROCEDURE — 25000132 ZZH RX MED GY IP 250 OP 250 PS 637: Mod: GY | Performed by: ORTHOPAEDIC SURGERY

## 2018-09-16 PROCEDURE — A9270 NON-COVERED ITEM OR SERVICE: HCPCS | Mod: GY | Performed by: INTERNAL MEDICINE

## 2018-09-16 PROCEDURE — A9270 NON-COVERED ITEM OR SERVICE: HCPCS | Mod: GY | Performed by: PHYSICIAN ASSISTANT

## 2018-09-16 PROCEDURE — A9270 NON-COVERED ITEM OR SERVICE: HCPCS | Mod: GY | Performed by: HOSPITALIST

## 2018-09-16 PROCEDURE — 00000146 ZZHCL STATISTIC GLUCOSE BY METER IP

## 2018-09-16 RX ADMIN — AMLODIPINE BESYLATE 5 MG: 5 TABLET ORAL at 08:17

## 2018-09-16 RX ADMIN — APIXABAN 5 MG: 5 TABLET, FILM COATED ORAL at 21:12

## 2018-09-16 RX ADMIN — SIMVASTATIN 40 MG: 40 TABLET, FILM COATED ORAL at 21:06

## 2018-09-16 RX ADMIN — Medication 12.5 MG: at 21:14

## 2018-09-16 RX ADMIN — METOPROLOL SUCCINATE 50 MG: 50 TABLET, EXTENDED RELEASE ORAL at 21:06

## 2018-09-16 RX ADMIN — PANTOPRAZOLE SODIUM 40 MG: 40 TABLET, DELAYED RELEASE ORAL at 08:24

## 2018-09-16 RX ADMIN — ISOSORBIDE MONONITRATE 30 MG: 30 TABLET, EXTENDED RELEASE ORAL at 08:17

## 2018-09-16 RX ADMIN — CEFTRIAXONE SODIUM 2 G: 2 INJECTION, POWDER, FOR SOLUTION INTRAMUSCULAR; INTRAVENOUS at 15:39

## 2018-09-16 RX ADMIN — LISINOPRIL 20 MG: 20 TABLET ORAL at 21:12

## 2018-09-16 RX ADMIN — LISINOPRIL 20 MG: 20 TABLET ORAL at 08:17

## 2018-09-16 RX ADMIN — Medication 12.5 MG: at 00:43

## 2018-09-16 RX ADMIN — APIXABAN 5 MG: 5 TABLET, FILM COATED ORAL at 08:16

## 2018-09-16 RX ADMIN — SENNOSIDES AND DOCUSATE SODIUM 1 TABLET: 8.6; 5 TABLET ORAL at 08:17

## 2018-09-16 RX ADMIN — SENNOSIDES AND DOCUSATE SODIUM 1 TABLET: 8.6; 5 TABLET ORAL at 21:07

## 2018-09-16 RX ADMIN — Medication 5 MG: at 21:14

## 2018-09-16 RX ADMIN — LEVOTHYROXINE SODIUM 137 MCG: 25 TABLET ORAL at 21:05

## 2018-09-16 ASSESSMENT — ACTIVITIES OF DAILY LIVING (ADL)
ADLS_ACUITY_SCORE: 10

## 2018-09-16 NOTE — PROGRESS NOTES
[facility]    Hospitalist Progress Note    Date of Service: 09/16/2018    Interval History   Denies symptoms waiting for a stress test on Monday as planned        Assessment & Plan   This is a pleasant 68-year-old gentleman with a past medical history of A. 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  He is noted to have a left diabetic foot ulcer as well as a right third toe cellulitis.  He believes he had upcoming amputation plans but the patient seemed confused about these.  He was admitted to medicine with orthopedics consultation.  He underwent MRI of his left and right feet and was found to have osteomyelitis of his right third toe.  Blood cultures a and wound cultures re growing strep agalactiae. He is s/p 9/11/18  I& D 1st MTP joint plantar ulcer left foot, amputation distal phalanx 3rd toe right foot      Chest pain angina equivalent  -Though atypical, he is certainly high risk with previous PCI, significant diabetes with neuropathy and vasculopathy  -Appreciate cardiology consultation, recommending evaluation with a stress test prior to discharge, unfortunately drinked caffeine on Friday   -Lexiscan stress test on Monday    Pauses on Telemetry  Atrial fibrillation  History of CVA  -Held Eliquis for surgery, restarted after  -Was having 2.5-3 second pauses frequently on telemetry on the evening of 9/11-9/12.  Dr. Zurita reduced his Toprol from 100 mg to 50 mg and he is having fewer pauses though they do persist but less frequent and <3 sec  -Continue to monitor on telemetry for now, if he keeps having pauses he may need further metoprolol reduction, also Lexiscan result tomorrow is important to rule out ischemia    Strep agalactiae bacteremia  Right third toe  cellulitis and osteomyelitis  -TTE negative  -Nontoxic  -s/p I&D of left foot ulcer and distal right 3rd phalanx amputation   -Appreciate orthopedics and ID consultation  -Seen and fitted by Orthotist  -He will do outpatient infusion of ceftriaxone to complete his course, he will be done with this course next Wednesday, 14 days recommended by infectious disease  -Has declined home services, too expensive, he will come as outpatient he does have a PICC line for his infusion after discharge    Chronic Systolic CHF  -Euvolemic  -EF 35% by echo this visit, stable  -Continue ACE/BB    CAD status post PCI  -Most recently stented June 2017  -On Eliquis/Plavix, held for OR, Eliquis resumed now, he did refuse to take Plavix because he might have more surgeries on his foot he tells me, although no  notes from orthopedics stating planned surgery  BP control, statin    Type 2 diabetes with neuropathy on insulin  -Changed to Toujeo (glargine) 35 units every morning plus NovoLog TIDAC based on some low blood sugars in the hospital, better, decrease pre-meal to 7 units from 10, was running in the 70-80's    Hypothyroidism-Synthroid  GERD-Protonix      Discussed with: Patient's RN     DVT Prophylaxis: Eliquis  Code Status: Full Code    Disposition: Expected discharge Monday if stress test is negative, has a PICC line will need 2 more days of intravenous Rocephin outpatient infusion.  If stress test is abnormal follow-up with cardiology here after discharge     Timi John MD        Physical Exam   Temp: 95.9  F (35.5  C) Temp src: Oral BP: 148/88 Pulse: 71 Heart Rate: 73 Resp: 20 SpO2: 96 % O2 Device: None (Room air)    Vitals:    09/06/18 1603 09/06/18 2053   Weight: 113.4 kg (250 lb) 115.1 kg (253 lb 11.2 oz)     Vital Signs with Ranges  Temp:  [95.9  F (35.5  C)-97.5  F (36.4  C)] 95.9  F (35.5  C)  Pulse:  [71] 71  Heart Rate:  [73-74] 73  Resp:  [16-20] 20  BP: (136-184)/() 148/88  SpO2:  [96 %-97 %] 96 %  I/O  last 3 completed shifts:  In: 2270 [P.O.:1060; I.V.:1210]  Out: -     Constitutional: Awake, alert, cooperative, no apparent distress  Respiratory: Clear to auscultation bilaterally, no crackles or wheezing  Cardiovascular: S1S2, well-perfused, no edema  GI: Normal bowel sounds, soft, non-distended, non-tender  Skin/Integumentary: Both feet are wrapped, dressing are clean, bilateral neuropathy      Medications     - MEDICATION INSTRUCTIONS -         amLODIPine  5 mg Oral Daily     apixaban ANTICOAGULANT  5 mg Oral BID     cefTRIAXone  2 g Intravenous Q24H     doxylamine  12.5 mg Oral At Bedtime     insulin aspart  7 Units Subcutaneous TID w/meals     insulin aspart  1-10 Units Subcutaneous TID AC     insulin aspart  1-7 Units Subcutaneous At Bedtime     insulin glargine U-300  35 Units Subcutaneous QAM     isosorbide mononitrate  30 mg Oral Daily     levothyroxine  137 mcg Oral At Bedtime     lisinopril  20 mg Oral BID     melatonin  5 mg Oral At Bedtime     metoprolol succinate  50 mg Oral At Bedtime     pantoprazole  40 mg Oral QAM AC     senna-docusate  1 tablet Oral BID    Or     senna-docusate  2 tablet Oral BID     simvastatin  40 mg Oral At Bedtime     sodium chloride (PF)  10 mL Intracatheter Q8H       Data     Recent Labs  Lab 09/15/18  0812 09/14/18  1415 09/13/18  1311 09/13/18  0759 09/13/18  0758 09/12/18  0725 09/10/18  0653   WBC 6.0  --   --   --   --  7.4 5.1   HGB 13.5  --   --  13.9  --  13.1* 11.9*   MCV 89  --   --   --   --  87 87     --   --   --   --  235 186     --   --   --   --  139 139   POTASSIUM 4.1  --   --   --   --  4.3 3.4   CHLORIDE 106  --   --   --   --  105 104   CO2 25  --   --   --   --  27 28   BUN 11  --   --   --   --  11 11   CR 0.86  --   --   --   --  0.96 0.94   ANIONGAP 8  --   --   --   --  7 7   BETTIE 8.6  --   --   --   --  8.3* 8.6   *  --   --   --  123* 111* 126*   TROPI <0.015 <0.015 <0.015  --   --   --   --        -Data reviewed today: I  reviewed all new labs and imaging results over the last 24 hours. I personally reviewed no images or EKG's today.

## 2018-09-16 NOTE — PLAN OF CARE
Problem: Patient Care Overview  Goal: Plan of Care/Patient Progress Review  Outcome: No Change  Pt ambulating SBA with walker and gait belt. Regular diet, tolerating well.  and 88. Dressing changes done. Complaints of headache, tylenol given. Highest /100, scheduled metoprolol given. Recheck 157/96. Tele in place, AFIB and one 2 sec pause this shift. At 23:54 tele called and stated patient had another 2.1 second pause. Nursing will continue to monitor.

## 2018-09-16 NOTE — PROVIDER NOTIFICATION
Pts blood sugars have been on the low side recently. last BS 77. do you want us to continue with the 10 units of insulin?    MD paged    Holding scheduled Novolog dose at this time. Changing scheduled Novolog 10 units to 7 units.

## 2018-09-16 NOTE — PLAN OF CARE
Problem: Patient Care Overview  Goal: Plan of Care/Patient Progress Review  Outcome: Improving  Pt ambulating SBA. Diet- ModCarb diet, tolerating well.  and 77. BS recheck 98. MD paged about consistent lower BS. (see provider notification note). Tele in place, AFIB controlled with ST depressions per tele tech. Dressings changed on BLE. No complaints of chest pain or headache this shift. Plan: stress test tomorrow. Nursing will continue to monitor.

## 2018-09-16 NOTE — PLAN OF CARE
Problem: Patient Care Overview  Goal: Plan of Care/Patient Progress Review  Outcome: No Change  Pt POD 5. BP hypertensive, hydralazine given with good results. BG 98. Continues on tele, no c/o chest pain. Midline infusing.  Up SBA. Stress test planned for Monday. Slept ok overnight.

## 2018-09-16 NOTE — PROGRESS NOTES
"Jayro Elder  [birth year]  S/p I and D left great toe ulcer, amputation at distal interphangeal joint level, third toe, right foot    S:  Patient lying in bed with dressing applied bilaterally. He states little to no pain today. He notes to refusing Plavix in case of further surgery. No other complaints today    O:  /88 (BP Location: Right arm)  Pulse 71  Temp 95.9  F (35.5  C) (Oral)  Resp 20  Ht 1.93 m (6' 4\")  Wt 115.1 kg (253 lb 11.2 oz)  SpO2 96%  BMI 30.88 kg/m2   -Patient in bed with bilateral dressings in place  -Left dressing removed and wound healing  -Right dressing removed and has current drainage around incision worrisome for necrosis    P:  Stress test Monday   Continue WB through heel in post op shoe and walker  Continue pain management   Continue Abx per ID    Crow Osman PA-C  "

## 2018-09-16 NOTE — OP NOTE
Procedure Date: 09/11/2018      DATE OF SERVICE: 09/11/2018      PREOPERATIVE DIAGNOSES:   1.  Plantar ulcer, first metatarsophalangeal joint, left foot.   2.  Third toe ulcer with osteomyelitis, right foot.      POSTOPERATIVE DIAGNOSES:     1.  Plantar ulcer, first metatarsophalangeal joint, left foot.    2.  Third toe ulcer with osteomyelitis, right foot.      PROCEDURES:   1.  Irrigation and debridement, left great toe ulcer.   2.  Amputation at distal interphalangeal joint level, third toe, right foot.      SURGEON:  Miki Harp MD      ASSISTANT:  Flavio Baxter PA-C      ANESTHESIA:  Regional plus sedation.      ESTIMATED BLOOD LOSS:  Minimal.      DRAINS:  No drains.      COMPLICATIONS:  No complications.      SPECIMENS:  Third toe of right foot.      INDICATIONS:  Jayro Elder is a 68-year-old man with peripheral neuropathy who presented with a fairly large ulcer at the plantar aspect of his first MTP joint.  This had a large central area of hypertrophic tissue protruding through the ulcer.  It had some odor to it, but MRI scan did not show deep infection.  On the right side, he had a chronic ulcer at the tip of his right third toe.  MRI scan here did demonstrate evidence for osteomyelitis in the distal phalanx.  After discussion of risks, benefits and alternatives, he elected to proceed with surgical treatment.      PROCEDURE IN DETAIL AND FINDINGS:  The patient was identified in the preoperative area and the appropriate limbs marked. He underwent a regional anesthetic by the Anesthesia team.  He was brought to the operating room where general anesthetic was administered and an airway secured without difficulty.  Preoperative antibiotic prophylaxis was provided within an hour of the incision.  Prophylaxis was discontinued on the day of surgery.  A tourniquet was placed on the bilateral ankles.  The bilateral feet were prepped and draped in sterile fashion.  The left side was first elevated for  exsanguination and the tourniquet inflated.  A pause for the cause was performed.      Beginning on the left, I sharply debrided the hypertrophic scar tissue from the central portion of the plantar ulcer.  This was fairly thick, but did not actually go through the full layer of the dermis.  There was no exposed muscle, only epidermis and dermis and into the subcutaneous tissue.  The area appeared healthy and there were no sinus tracts identified.  No bone was exposed.  I irrigated the area thoroughly.  I continued to excise thickened hypertrophic scar tissue from around the ring of the ulcer.  I then dressed this with Xeroform and gauze.      I then turned my attention to the right side.  The limb was elevated for exsanguination and the tourniquet inflated.  I made a plantar flap incision through the level of the DIP joint, leaving a full-thickness plantar flap.  I then excised the distal aspect of the middle phalanx at the distal neck.  This allowed less tension on the repair.  The wound was thoroughly irrigated.  There was no purulence identified.  I then closed the flap with minimal tension.      This was dressed again with Xeroform and clean gauze.      All sponge and needle counts were correct.  The patient tolerated the procedure well.  There were no complications.      PLAN:  He will be heel weightbearing only on both sides.  He will be seen by the Wound Care team.  His medical issues will be managed.  We will evaluate his right third toe wound for suture removal at 2 weeks.         TIARRA ESCOBAR MD             D: 09/15/2018   T: 2018   MT:       Name:     PORTER YANCEY   MRN:      [id number]        Account:        [id number]   :      1950           Procedure Date: 2018      Document: [id number]

## 2018-09-16 NOTE — PROVIDER NOTIFICATION
Pts last /105. No PRN meds available. Just gave scheduled metoprolol.     PRN IV hydralazine added to MAR. Parameters not met.

## 2018-09-17 ENCOUNTER — APPOINTMENT (OUTPATIENT)
Dept: NUCLEAR MEDICINE | Facility: CLINIC | Age: 68
DRG: 617 | End: 2018-09-17
Attending: INTERNAL MEDICINE
Payer: MEDICARE

## 2018-09-17 VITALS
RESPIRATION RATE: 16 BRPM | SYSTOLIC BLOOD PRESSURE: 167 MMHG | BODY MASS INDEX: 30.89 KG/M2 | DIASTOLIC BLOOD PRESSURE: 97 MMHG | HEIGHT: 76 IN | WEIGHT: 253.7 LBS | HEART RATE: 77 BPM | TEMPERATURE: 96.4 F | OXYGEN SATURATION: 96 %

## 2018-09-17 LAB
GLUCOSE BLDC GLUCOMTR-MCNC: 104 MG/DL (ref 70–99)
GLUCOSE BLDC GLUCOMTR-MCNC: 119 MG/DL (ref 70–99)
GLUCOSE BLDC GLUCOMTR-MCNC: 156 MG/DL (ref 70–99)
GLUCOSE BLDC GLUCOMTR-MCNC: 156 MG/DL (ref 70–99)

## 2018-09-17 PROCEDURE — 93017 CV STRESS TEST TRACING ONLY: CPT

## 2018-09-17 PROCEDURE — 78452 HT MUSCLE IMAGE SPECT MULT: CPT

## 2018-09-17 PROCEDURE — 34300033 ZZH RX 343: Performed by: INTERNAL MEDICINE

## 2018-09-17 PROCEDURE — 00000146 ZZHCL STATISTIC GLUCOSE BY METER IP

## 2018-09-17 PROCEDURE — 93016 CV STRESS TEST SUPVJ ONLY: CPT | Performed by: INTERNAL MEDICINE

## 2018-09-17 PROCEDURE — 25000128 H RX IP 250 OP 636: Performed by: INTERNAL MEDICINE

## 2018-09-17 PROCEDURE — 99239 HOSP IP/OBS DSCHRG MGMT >30: CPT | Performed by: INTERNAL MEDICINE

## 2018-09-17 PROCEDURE — 99232 SBSQ HOSP IP/OBS MODERATE 35: CPT | Performed by: INTERNAL MEDICINE

## 2018-09-17 PROCEDURE — 78452 HT MUSCLE IMAGE SPECT MULT: CPT | Mod: 26 | Performed by: INTERNAL MEDICINE

## 2018-09-17 PROCEDURE — 25000132 ZZH RX MED GY IP 250 OP 250 PS 637: Mod: GY | Performed by: PHYSICIAN ASSISTANT

## 2018-09-17 PROCEDURE — 25000132 ZZH RX MED GY IP 250 OP 250 PS 637: Mod: GY | Performed by: INTERNAL MEDICINE

## 2018-09-17 PROCEDURE — A9502 TC99M TETROFOSMIN: HCPCS | Performed by: INTERNAL MEDICINE

## 2018-09-17 PROCEDURE — 93018 CV STRESS TEST I&R ONLY: CPT | Performed by: INTERNAL MEDICINE

## 2018-09-17 PROCEDURE — A9270 NON-COVERED ITEM OR SERVICE: HCPCS | Mod: GY | Performed by: INTERNAL MEDICINE

## 2018-09-17 PROCEDURE — A9270 NON-COVERED ITEM OR SERVICE: HCPCS | Mod: GY | Performed by: PHYSICIAN ASSISTANT

## 2018-09-17 PROCEDURE — 25000128 H RX IP 250 OP 636

## 2018-09-17 RX ORDER — CLOPIDOGREL BISULFATE 75 MG/1
75 TABLET ORAL DAILY
Status: DISCONTINUED | OUTPATIENT
Start: 2018-09-17 | End: 2018-09-17 | Stop reason: HOSPADM

## 2018-09-17 RX ORDER — METOPROLOL SUCCINATE 100 MG/1
50 TABLET, EXTENDED RELEASE ORAL AT BEDTIME
Qty: 90 TABLET | Refills: 3 | Status: ON HOLD | OUTPATIENT
Start: 2018-09-17 | End: 2019-01-24

## 2018-09-17 RX ORDER — AMINOPHYLLINE 25 MG/ML
INJECTION, SOLUTION INTRAVENOUS
Status: DISCONTINUED
Start: 2018-09-17 | End: 2018-09-17 | Stop reason: WASHOUT

## 2018-09-17 RX ORDER — REGADENOSON 0.08 MG/ML
INJECTION, SOLUTION INTRAVENOUS
Status: COMPLETED
Start: 2018-09-17 | End: 2018-09-17

## 2018-09-17 RX ADMIN — CEFTRIAXONE SODIUM 2 G: 2 INJECTION, POWDER, FOR SOLUTION INTRAMUSCULAR; INTRAVENOUS at 14:54

## 2018-09-17 RX ADMIN — CLOPIDOGREL 75 MG: 75 TABLET, FILM COATED ORAL at 13:51

## 2018-09-17 RX ADMIN — AMLODIPINE BESYLATE 5 MG: 5 TABLET ORAL at 08:21

## 2018-09-17 RX ADMIN — PANTOPRAZOLE SODIUM 40 MG: 40 TABLET, DELAYED RELEASE ORAL at 08:21

## 2018-09-17 RX ADMIN — ISOSORBIDE MONONITRATE 30 MG: 30 TABLET, EXTENDED RELEASE ORAL at 08:20

## 2018-09-17 RX ADMIN — APIXABAN 5 MG: 5 TABLET, FILM COATED ORAL at 08:21

## 2018-09-17 RX ADMIN — TETROFOSMIN 11 MCI.: 1.38 INJECTION, POWDER, LYOPHILIZED, FOR SOLUTION INTRAVENOUS at 07:23

## 2018-09-17 RX ADMIN — REGADENOSON 0.4 MG: 0.08 INJECTION, SOLUTION INTRAVENOUS at 10:15

## 2018-09-17 RX ADMIN — LISINOPRIL 20 MG: 20 TABLET ORAL at 08:21

## 2018-09-17 RX ADMIN — TETROFOSMIN 31.5 MCI.: 1.38 INJECTION, POWDER, LYOPHILIZED, FOR SOLUTION INTRAVENOUS at 11:19

## 2018-09-17 ASSESSMENT — ACTIVITIES OF DAILY LIVING (ADL)
ADLS_ACUITY_SCORE: 10

## 2018-09-17 NOTE — PROGRESS NOTES
[facility]    Infectious Disease Progress Note    Date of Service (when I saw the patient): 09/17/2018     Assessment & Plan   Jayro Elder is a 68 year old male who was admitted on 9/6/2018.     INFECTIOUS IMPRESSION:   1.  A 68-year-old diabetic male with acute nausea, low-grade fever and probable right third toe and foot infection.  Cultures from foot, MSSA and Group B strep, blood cultures Group B strep.    2.  Chronic left first metatarsal ulcer, Chronic right foot ulcer, now worsening. S/P I and d of the left great toe and amputation 3rd toe in the right.   4.  Diabetes mellitus with neuropathy.   5.  Prior cerebrovascular infarction.   6.  Coronary artery disease.   7.  History of atrial fibrillation.  8 intermittent complaints of chest pain, cardiac work up underway.       RECOMMENDATIONS:   1. S/p I &D ulcer left great toe Amputation 3rd toe right foot at DIP level  Given group B bacteremia will recommend 2 weeks total of IV antibiotics, 9 days done in the hospital if ready for discharge other wise ok to go on 2more days of once a day ceftriaxone. Orders are in the chart. Looks like discharge ready    Jose Alfredo Zacarias MD    Interval History   Afebrile. Amp site Ok    Physical Exam   Temp: 97  F (36.1  C) Temp src: Oral BP: 144/89 Pulse: 77 Heart Rate: 75 Resp: 16 SpO2: 97 % O2 Device: None (Room air)    Vitals:    09/06/18 1603 09/06/18 2053   Weight: 113.4 kg (250 lb) 115.1 kg (253 lb 11.2 oz)     Vital Signs with Ranges  Temp:  [96.2  F (35.7  C)-97.2  F (36.2  C)] 97  F (36.1  C)  Pulse:  [77] 77  Heart Rate:  [66-75] 75  Resp:  [16] 16  BP: (143-172)/(82-97) 144/89  SpO2:  [94 %-97 %] 97 %    Constitutional: Awake, alert, cooperative, no apparent distress  Lungs: Clear to auscultation bilaterally, no crackles or wheezing  Cardiovascular: Regular rate and rhythm, normal S1 and S2, and no murmur noted  Abdomen: Normal bowel sounds, soft, non-distended, non-tender  Skin: No rashes, no  cyanosis, no edema  Other:    Medications     - MEDICATION INSTRUCTIONS -         amLODIPine  5 mg Oral Daily     apixaban ANTICOAGULANT  5 mg Oral BID     cefTRIAXone  2 g Intravenous Q24H     clopidogrel  75 mg Oral Daily     doxylamine  12.5 mg Oral At Bedtime     insulin aspart  7 Units Subcutaneous TID w/meals     insulin aspart  1-10 Units Subcutaneous TID AC     insulin aspart  1-7 Units Subcutaneous At Bedtime     insulin glargine U-300  35 Units Subcutaneous QAM     isosorbide mononitrate  30 mg Oral Daily     levothyroxine  137 mcg Oral At Bedtime     lisinopril  20 mg Oral BID     melatonin  5 mg Oral At Bedtime     metoprolol succinate  50 mg Oral At Bedtime     pantoprazole  40 mg Oral QAM AC     senna-docusate  1 tablet Oral BID    Or     senna-docusate  2 tablet Oral BID     simvastatin  40 mg Oral At Bedtime     sodium chloride (PF)  10 mL Intracatheter Q8H       Data   All microbiology laboratory data reviewed.  Recent Labs   Lab Test  09/15/18   0812  09/13/18   0759  09/12/18   0725  09/10/18   0653   WBC  6.0   --   7.4  5.1   HGB  13.5  13.9  13.1*  11.9*   HCT  40.6   --   38.2*  35.4*   MCV  89   --   87  87   PLT  270   --   235  186     Recent Labs   Lab Test  09/15/18   0812  09/12/18   0725  09/10/18   0653   CR  0.86  0.96  0.94     Recent Labs   Lab Test  09/08/18   0614   SED  15     Recent Labs   Lab Test  09/09/18   1533  09/08/18   0613  09/06/18   1550  09/06/18   1545  09/06/18   1541  03/03/18   1936  03/03/18   1843   CULT  No growth  No growth  Cultured on the 1st day of incubation:  Streptococcus agalactiae sero group B  *  Critical Value/Significant Value, preliminary result only, called to and read back by  Teresa John RN on RHMS5 @1055 on 9/7/18. .    (Note)  POSITIVE for STREPTOCOCCUS AGALACTIAE (Group B Strep) by [facility]  multiplex nucleic acid test. Penicillin and ampicillin are drugs of  choice, nonsusceptible isolates have not been reported.  Final  identification and antimicrobial susceptibility testing will be  verified by standard methods.    Specimen tested with [facility] multiplex, gram-positive blood culture  nucleic acid test for the following targets: Staph aureus, Staph  epidermidis, Staph lugdunensis, other Staph species, Enterococcus  faecalis, Enterococcus faecium, Streptococcus species, S. agalactiae,  S. anginosus grp., S. pneumoniae, S. pyogenes, Listeria sp., mecA  (methicillin resistance) and Elisha/B (vancomycin resistance).    Critical Value/Significant Value called to and read back by Teresa John RN on 9.7.18 at 1340. bw      Cultured on the 1st day of incubation:  Streptococcus agalactiae sero group B  Susceptibility testing done on previous specimen  *  Critical Value/Significant Value, preliminary result only, called to and read back by  Ofe Fernandes RN on RHMS5 @1515 on 9/7/18. ch.    Heavy growth  Staphylococcus aureus  *  Heavy growth  Beta hemolytic Streptococcus group B  *  Moderate growth  Normal skin michael    No growth  No growth  No growth

## 2018-09-17 NOTE — PLAN OF CARE
Problem: Patient Care Overview  Goal: Plan of Care/Patient Progress Review  POD#6, patient alert and oriented x4, up SBA, walker, and post-op shoes in room. Was downstairs getting a stress tests for most of the shift. Denies pain and nausea. Dressing changes done this AM by orthopedic MD. Blood sugars 119 and 156. Tolerating diet, good appetite. Midline SL. Home Plavix re-started. On tele, per tele tech controlled A-fib, HR 67. Discharge pending.     Writer sent fax to outpatient infusion clinic at 1500 regarding antibiotic infusion needed at discharge.

## 2018-09-17 NOTE — PROVIDER NOTIFICATION
Paged Dr. Zacarias regarding antibiotic stop date, patient will need two more days of infusions. Updated patient.

## 2018-09-17 NOTE — PROGRESS NOTES
Pre-procedure:  Are you having any pain or shortness of breath (prior to starting)? no  Initial vital signs: /85, HR 72, RR 11  Allergies reviewed: yes   Rhythm:   Medications taken within 48 hours of procedure:    Last Caffeine:   Lung sounds: CTA, no wheezing, crackles or rtx  Health History (COPD, Asthma, etc):            Procedure: Lexiscan  Reaction/symptoms after receiving Xochitl injection:   Intensity of Pain:   Rhythm:   1. Vital Signs:/65, HR 71, RR 14  2. Vital Signs:/70, HR 73, RR 14     Reversal agent:     Post:   Resolution of symptoms?: YES  Vital signs: /69, HR 76, RR 12  Vital signs: BP /, HR , RR   Rhythm:   Walk: NO  Comment:   Return to Radiology

## 2018-09-17 NOTE — PLAN OF CARE
Problem: Patient Care Overview  Goal: Plan of Care/Patient Progress Review  Outcome: No Change  POD 5 I&D left greater toe and amputation third toe right foot. Dressings to BLE CDI. Assist of one, heel weight bearing with walker and gait belt. Denies pain. Cardiology, ortho, ID, and WOC consults. VSS with exception of increased blood pressure, PM medications given, decrease in BP. IV rocephin. PICC unremarkable. , 143. Tele a fib with CVR, 1 episode of 2.4 second pause.

## 2018-09-17 NOTE — PLAN OF CARE
Problem: Patient Care Overview  Goal: Plan of Care/Patient Progress Review  Outcome: No Change  [id number]    Vitals: VSS, afebrile  Neuro: A&O, bilat numbness/tingling to feet  Lungs: LS clear, room air  Cardiac: Tele: A-fib CVR  IV: Midline SL  Labs: BG 0200: 104  Dressing: Ace wraps to bilateral feet CDI  GI: BS active, no bm overnight, passing flatus  : voiding adequately  Diet:Mod CHO no caffeine  Pain:Denies  Activity:  SBA, needs assistance putting on special shoes  Plan: Plan for  lexiscan this morning. If negative can d.c home. Will need two more OP abx infusions.

## 2018-09-17 NOTE — DISCHARGE SUMMARY
[facility]    Discharge Summary  Hospitalist    Date of Admission:  9/6/2018  Date of Discharge:  9/17/2018  7:12 PM  Provider:  Anthony Baker DO, [facility]    Discharge Diagnoses   1.  Strep agalactiae bacteremia  2.  Right 3rd toe cellulitis and osteomyelitis 3rd toe amputation  3.  Episodes of bradycardia and pauses that improved after reducing beta blocker dosage  4.  Episode of chest discomfort, negative stress test for inducible new ischemia.  Medical management recommended by cardiology.    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     Cardiomyopathy (H)      Cellulitis of left lower extremity 3/13/2018     Cerebral infarction (H)     eye sight decreased in peripheral of right side and blurry     Diabetic ulcer of left midfoot associated with type 2 diabetes mellitus, with fat layer exposed (H) 3/13/2018     Essential hypertension, benign 11/13/2002     Generalized osteoarthrosis, unspecified site 11/13/2002     Microalbuminuria 3/13/2018    X1     Myocardial infarction      Neuropathy      Sepsis (H)      Sleep apnea     doesn't use it     Syncope, unspecified syncope type 3/13/2018     Thyroid disease     takes medicine     Tobacco use disorder 11/13/2002     Type 2 diabetes mellitus with diabetic peripheral angiopathy without gangrene, unspecified long term insulin use status (H) 3/13/2018     Type II or unspecified type diabetes mellitus without mention of complication, not stated as uncontrolled 7/14/2005       History of Present Illness   Jayro Elder is an 68 year old male who presented with feeling generally unwell with weakness and dizziness.  Please see the admission history and physical for full details.    Hospital Course   Jayro Elder was admitted on 9/6/2018.  The following problems were addressed during his  "hospitalization:    Mr. Elder had strep bacteremia associated with a foot infection.  He was seen by infectious disease and prescribed a 2 week course of IV ceftriaxone.  He also was seen by ortho and had I&D plantar ulcer and 3rd right toe amputation.  The patient improved and was discharged with a plan for a couple more days of IV outpatient infusion center ceftriaxone.  During his stay he was noted to have some bradycardia with pauses.  His beta blocker dose was reduced and his rate/pauses improved.  BP overall was reasonable but he had a couple spikes at times.  I recommended PCP follow-up as below for reassessment of the BP meds. He had variable blood glucose control during his stay partly exacerbated by the infection.  I also believe he eats somewhat differently when not in the hospital.  During his stay he had chest discomfort that was somewhat atypical.  He has a known cardiac history.  He was not felt to have ACS.  He was seen by cardiology and a stress test was completed before discharge.  This did not show inducible ischemia.  He was recommended for continued medical management.  The patient felt well the day of discharge and he was comfortable with the plan.  He had been advised to consider home care but declined.      Significant Results and Procedures   PICC line, surgery as noted above    Pending Results     Unresulted Labs Ordered in the Past 30 Days of this Admission     No orders found from 7/8/2018 to 9/7/2018.           Code Status   Full Code       Primary Care Physician   Davion Cordova    Blood pressure (!) 167/97, pulse 77, temperature 96.4  F (35.8  C), temperature source Oral, resp. rate 16, height 1.93 m (6' 4\"), weight 115.1 kg (253 lb 11.2 oz), SpO2 96 %.  Heart regular, lungs clear, abdomen non-tender.      Discharge Disposition   Discharged to home    Consultations This Hospital Stay   PHARMACY TO DOSE VANCO  INFECTIOUS DISEASES IP CONSULT  ORTHOPEDIC SURGERY IP CONSULT  CARE " COORDINATOR IP CONSULT  PHARMACY DISCHARGE EDUCATION BY PHARMACIST  WOUND OSTOMY CONTINENCE NURSE  IP CONSULT  ORTHOPEDIC SURGERY IP CONSULT  OCCUPATIONAL THERAPY ADULT IP CONSULT  PHYSICAL THERAPY ADULT IP CONSULT  ORTHOSIS EXTREMITY LOWER REFERRAL IP CONSULT  VASCULAR ACCESS ADULT IP CONSULT  VASCULAR ACCESS ADULT IP CONSULT  SOCIAL WORK IP CONSULT  CARDIOLOGY IP CONSULT  WOUND OSTOMY CONTINENCE NURSE  IP CONSULT    Time Spent on this Encounter   IAnthony, personally saw the patient today and spent greater than 30 minutes discharging this patient.    Discharge Orders     Reason for your hospital stay   Foot infection     When to contact your care team   Call if questions.  Notify provider if fevers, worsening pain, worsening appearance foot/leg, glucoses running under 70 or over 350, other new medical concerns.     Follow-up and recommended labs and tests    Follow-up primary provider in 1-2 weeks.  Follow-up orthopedics as they separately recommended.     Discharge Instructions   You should continue with daily ceftriaxone antibiotics at the infusion center for 2 more days.  They then can remove your IV line once your course is complete.     Full Code     Diet   Follow this diet upon discharge: Diabetic heart healthy       Discharge Medications   Current Discharge Medication List      START taking these medications    Details   cefTRIAXone (ROCEPHIN) 1 GM vial Inject 2 g (2,000 mg) into the vein daily for 7 days CBC with differential, creatinine, SGOT weekly while on this medication to be faxed to Dr. Zacarias office.  Qty: 600 mL, Refills: 0    Associated Diagnoses: Bacteremia      order for DME Equipment being ordered: Walker Wheels () and Walker ()  Treatment Diagnosis: Impaired gait, heel WB bilaterally.  Qty: 1 each, Refills: 0    Associated Diagnoses: Diabetic ulcer of left midfoot associated with diabetes mellitus of other type, unspecified ulcer stage (H)         CONTINUE these  medications which have CHANGED    Details   metoprolol succinate (TOPROL-XL) 100 MG 24 hr tablet Take 0.5 tablets (50 mg) by mouth At Bedtime  Qty: 90 tablet, Refills: 3    Associated Diagnoses: Coronary artery disease involving native coronary artery of native heart without angina pectoris         CONTINUE these medications which have NOT CHANGED    Details   amLODIPine (NORVASC) 5 MG tablet Take 1 tablet (5 mg) by mouth daily  Qty: 90 tablet, Refills: 0    Associated Diagnoses: Coronary artery disease involving native coronary artery of native heart with angina pectoris (H)      apixaban ANTICOAGULANT (ELIQUIS) 5 MG tablet Take by mouth 2 times daily      Cholecalciferol (D3 ADULT PO) Take 2,000 Units by mouth every evening       clopidogrel (PLAVIX) 75 MG tablet Take 1 tablet (75 mg) by mouth daily  Qty: 90 tablet, Refills: 2    Associated Diagnoses: Coronary artery disease involving native coronary artery of native heart with other form of angina pectoris (H)      insulin glargine U-300 (TOUJEO) 300 UNIT/ML injection Inject 35 Units Subcutaneous 2 times daily      insulin pen needle 31G X 6 MM Use  as directed.  Qty: 100 each, Refills: prn    Associated Diagnoses: Type 2 diabetes mellitus with diabetic peripheral angiopathy without gangrene, unspecified long term insulin use status (H)      isosorbide mononitrate (IMDUR) 30 MG 24 hr tablet Take 1 tablet (30 mg) by mouth daily  Qty: 90 tablet, Refills: 1    Associated Diagnoses: Hypertension goal BP (blood pressure) < 140/90      levothyroxine (SYNTHROID, LEVOTHROID) 137 MCG tablet Take 137 mcg by mouth At Bedtime   Qty: 90 tablet, Refills: 3      lisinopril (PRINIVIL/ZESTRIL) 20 MG tablet Take 1 tablet (20 mg) by mouth 2 times daily  Qty: 180 tablet, Refills: 3    Associated Diagnoses: Benign essential hypertension      nitroglycerin (NITROSTAT) 0.4 MG sublingual tablet Place 1 tablet (0.4 mg) under the tongue every 5 minutes as needed for chest pain  Qty: 50  tablet, Refills: 0    Associated Diagnoses: Other chest pain      pantoprazole (PROTONIX) 40 MG enteric coated tablet Take 1 tablet (40 mg) by mouth every morning (before breakfast)  Qty: 30 tablet, Refills: 0    Associated Diagnoses: GERD (gastroesophageal reflux disease)      silver sulfADIAZINE (SILVADENE) 1 % cream Apply topically daily  Qty: 50 g, Refills: 3    Associated Diagnoses: Pressure ulcer of toe of right foot, stage 3 (H)      simvastatin (ZOCOR) 40 MG tablet Take 1 tablet (40 mg) by mouth At Bedtime  Qty: 90 tablet, Refills: 3    Associated Diagnoses: Coronary artery disease involving native coronary artery of native heart without angina pectoris           Allergies   Allergies   Allergen Reactions     No Known Allergies      Data     Recent Labs  Lab 09/15/18  0812 09/13/18  0759 09/12/18  0725   WBC 6.0  --  7.4   HGB 13.5 13.9 13.1*   HCT 40.6  --  38.2*   MCV 89  --  87     --  235       Recent Labs  Lab 09/15/18  0812 09/13/18  0758 09/12/18  0725     --  139   POTASSIUM 4.1  --  4.3   CHLORIDE 106  --  105   CO2 25  --  27   ANIONGAP 8  --  7   * 123* 111*   BUN 11  --  11   CR 0.86  --  0.96   GFRESTIMATED 89  --  78   GFRESTBLACK >90  --  >90   BETTIE 8.6  --  8.3*     Results for orders placed or performed during the hospital encounter of 09/06/18   XR Chest 2 Views    Narrative    CHEST TWO VIEWS    9/6/2018 6:50 PM     HISTORY: Fever.    COMPARISON: Chest CT from 3/3/2018. Chest x-ray from 1/14/2018.      Impression    IMPRESSION: Heart size is upper normal. Pulmonary vasculature is not  distended. No focal infiltrates or evidence of pleural fluid. Healed  left rib fractures.      ELIZA ALEXANDER MD   MR Foot Left w/o & w Contrast    Narrative    MR LEFT FOOT WITHOUT AND WITH CONTRAST  9/6/2018 10:49 PM    HISTORY: History of chronic ulcer, confusion, fever, evaluate for  osteomyelitis.    COMPARISON: X-ray from 9/6/2018.    TECHNIQUE: Routine multiplanar, multisequence  imaging was performed.  12 mL Gadavist    FINDINGS:   There are prominent first MTP joint degenerative changes with some  subchondral edema. There are some erosive changes on the medial aspect  of the first metatarsal head and mild adjacent bone marrow edema. This  could also be degenerative or inflammatory in nature but early  osteomyelitis of the medial first metatarsal head not excluded. No  evidence of abscess or significant soft tissue edema elsewhere.  Moderate midfoot degenerative changes.      Impression    IMPRESSION:  1. Degenerative changes at the first MTP joint with associated  degenerative edema. There is an ulcer medially in this region and some  of the edema that is more prominent in the medial aspect of the first  metatarsal head could be related to early osteomyelitis in the  appropriate clinical setting.  2. No evidence of abscess.    ELIZA ALEXANDER MD   Foot XR, G/E 3 views, left    Narrative    LEFT FOOT THREE OR MORE VIEWS     9/6/2018 6:50 PM     HISTORY: Wound, evaluate for osteo.    COMPARISON: None.      Impression    IMPRESSION: There is soft tissue irregularity adjacent to the medial  aspect of the first MTP joint. There are degenerative changes here but  no lytic or destructive changes. No definite x-ray evidence of  osteomyelitis. Degenerative changes in the midfoot.      ELIZA ALEXANDER MD   Head CT w/o contrast    Narrative    CT SCAN OF THE HEAD WITHOUT CONTRAST   9/6/2018 6:39 PM     HISTORY: Confusion.    TECHNIQUE: Axial images of the head and coronal reformations without  IV contrast material. Radiation dose for this scan was reduced using  automated exposure control, adjustment of the mA and/or kV according  to patient size, or iterative reconstruction technique.    COMPARISON: 9/6/2018    FINDINGS:  Moderate volume loss is present. Patchy white matter hypoattenuation  likely represents chronic small vessel ischemic change. Old right  frontal and left occipital infarcts are  unchanged. No evidence of  acute ischemia, hemorrhage, mass, mass effect, or hydrocephalus. The  visualized calvarium, skull base, paranasal sinuses, and extracranial  soft tissues are unremarkable.      Impression    IMPRESSION:   No acute intracranial abnormality.    TATI WALKER MD   MR Foot Right w/o & w Contrast    Narrative    MR FOOT RIGHT WITH AND WITHOUT CONTRAST September 7, 2018 11:36 AM     HISTORY: Rule out osteomyelitis. Open wound on bottom of third toe.  Diabetic.    COMPARISON: 11/1/2017.    TECHNIQUE: Multiplanar, multisequence without and with contrast. 11 mL  Gadavist.     FINDINGS: There is a reported wound/ulcer at the plantar aspect of the  third toe distally. There is edematous signal of the adjacent third  distal phalanx with some contrast enhancement. In light of the  overlying ulcer, this is felt to represent osteomyelitis. No abscess  identified. There is nonspecific soft tissue edema along the foot.  This has contrast enhancement at the third toe, consistent with  cellulitis. Prominent degenerative changes at the second TMT joint and  first MTP joint. Hallux valgus. Mild fluid within the second-third  intermetatarsal bursa.      Impression    IMPRESSION:  1. Findings consistent with osteomyelitis of the third distal phalanx.  Cellulitis.  2. Additional findings discussed above.    YARIEL CRUZ MD   NM MPI w Lexiscan    Narrative    GATED MYOCARDIAL PERFUSION SCINTIGRAPHY WITH INTRAVENOUS PHARMACOLOGIC  VASODILATATION LEXISCAN -ONE DAY STUDY     9/17/2018 11:20 AM  PORTER MAYURI YANCEY  68 years  Male  1950.    Indication/Clinical History: Chest discomfort    Impression  1.  Myocardial perfusion imaging using single isotope technique  demonstrated a large size predominantly fixed mid to distal  anterior/anteroseptal defect consistent with a nontransmural infarct  with mild mid anterior wall carrie-infarct ischemia..   2. Gated images demonstrated mid to distal anterior anteroseptal  wall  hypokinesia.  The left ventricular systolic function is reduced with  LVEF of 39% with stress.  3. Compared to the prior study dated 06/02/2017 in previous study  inferior and lateral wall ischemia were also noted which is not  appreciated in present study.    Procedure  Pharmacologic stress testing was performed with Lexiscan at a rate of  0.08 mg/ml rapid bolus injection, for 15 seconds, 0.4 mg/5ml  intravenously. Low-level exercise was not performed along with the  vasodilator infusion.  The heart rate was 72 at baseline and ml to  78 beats per minute during the Lexiscan infusion. The rest blood  pressure was 133/85 mmHg and was 140/69 mm Hg during Lexiscan  infusion. The patient experienced chest pain, shortness of breath   during the test.    Myocardial perfusion imaging was performed at rest, approximately 45  minutes after the injection intravenously of 11 mCi of Tc-99m Myoview.  At peak pharmacologic effect, 10-20 seconds after Lexiscan,  the  patient was injected intravenously with 31.5 mCi of  Tc-99m Myoview.  The post-stress tomographic imaging was performed approximately 60  minutes after stress.    EKG Findings  The resting EKG demonstrated atrial fibrillation, right bundle branch  block, anteroseptal Q waves. The stress EKG demonstrated no  significant ST-T changes compared to baseline.    Tomographic Findings  Overall, the study quality is adequate . On both stress and rest  images there is moderate to severe intensity mid to distal  anterior/anteroseptal wall photopenic defect which appear of similar  intensity and extent on both rest and stress images. Gated images  demonstrated mid to distal anterior/anteroseptal wall hypokinesia. The  left ventricular ejection fraction was calculated to be 39% with  stress. TID was visually absent    SHEA RUIZ MD

## 2018-09-17 NOTE — DISCHARGE INSTRUCTIONS
Plan of care for wound located on Left 1st MTP and 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 Could also use soap and water after discharge. Apply Vaseline gauze, dry 4x4 gauze, Kerlix wrap. Secure with Ace bandage. Change once daily.    [facility] Wednesday 9/19/18 0830. Future follow up appointments will be made at this appointment.     Patient may heel weightbear as tolerated in post op shoes, should avoid pressure on midfoot/forefoot.

## 2018-09-17 NOTE — PROGRESS NOTES
"SPIRITUAL HEALTH SERVICES Progress Note  [facility]  Med. Surg. 5    Saw pt Jayro per his length of stay.  Oriented him to  services.      Jayro reported that he has been treated for \"sepsis\" and had part of a toe on one foot and a part of the other foot \"cut away.\"  He shared that he has been dealing with this infection for over a year and a half.  It has limited his activities because he has not been able to walk on his feet.      Jayro named his wife and three children as being central to his support system.  He is [employer] and affiliated with [employer] in [place].  Jayro has received communion while here.  He shared that he enjoys babysitting his eight grandchildren.      Jayro acknowledged the availability of  support but expressed no needs at this time, adding that he expects to discharge later today.      Jigar Amador M.Div., [facility]  Staff   Pager [phone]    "

## 2018-09-17 NOTE — PROGRESS NOTES
Cardiology Progress Note  September 17, 2018    Jayro Elder  [id number]    IMPRESSION/PLAN:    The patient is a pleasant 68-year-old gentleman with known coronary artery disease (with a prior LAD and OM 3 PCI) and a previous Lexiscan nuclear stress test showing a prior anterior infarction.  Cardiology was consulted due to atypical chest pain which he describes as a pressure in his neck.  His prior anginal symptoms were more back pain according to the patient.  His Lexiscan nuclear stress test today shows evidence for an old anterior infarction with a small amount of carrie-infarct ischemia.  I personally reviewed the stress images and would recommend continued medical management.  The patient is on an excellent regimen for his cardiac disease which I would recommend continuing unchanged.  He can follow-up with cardiology as previously planned as an outpatient.    Leif Goyal MD    Interval history    No new cardiac symptoms.    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     Cardiomyopathy (H)      Cellulitis of left lower extremity 3/13/2018     Cerebral infarction (H)     eye sight decreased in peripheral of right side and blurry     Diabetic ulcer of left midfoot associated with type 2 diabetes mellitus, with fat layer exposed (H) 3/13/2018     Essential hypertension, benign 11/13/2002     Generalized osteoarthrosis, unspecified site 11/13/2002     Microalbuminuria 3/13/2018    X1     Myocardial infarction  "     Neuropathy      Sepsis (H)      Sleep apnea     doesn't use it     Syncope, unspecified syncope type 3/13/2018     Thyroid disease     takes medicine     Tobacco use disorder 11/13/2002     Type 2 diabetes mellitus with diabetic peripheral angiopathy without gangrene, unspecified long term insulin use status (H) 3/13/2018     Type II or unspecified type diabetes mellitus without mention of complication, not stated as uncontrolled 7/14/2005       Current Facility-Administered Medications   Medication Dose Route Frequency     aminophylline         amLODIPine  5 mg Oral Daily     apixaban ANTICOAGULANT  5 mg Oral BID     cefTRIAXone  2 g Intravenous Q24H     clopidogrel  75 mg Oral Daily     doxylamine  12.5 mg Oral At Bedtime     insulin aspart  7 Units Subcutaneous TID w/meals     insulin aspart  1-10 Units Subcutaneous TID AC     insulin aspart  1-7 Units Subcutaneous At Bedtime     insulin glargine U-300  35 Units Subcutaneous QAM     isosorbide mononitrate  30 mg Oral Daily     levothyroxine  137 mcg Oral At Bedtime     lisinopril  20 mg Oral BID     melatonin  5 mg Oral At Bedtime     metoprolol succinate  50 mg Oral At Bedtime     pantoprazole  40 mg Oral QAM AC     senna-docusate  1 tablet Oral BID    Or     senna-docusate  2 tablet Oral BID     simvastatin  40 mg Oral At Bedtime     sodium chloride (PF)  10 mL Intracatheter Q8H         PE:  /82 (BP Location: Right arm)  Pulse 77  Temp 97.2  F (36.2  C) (Oral)  Resp 16  Ht 1.93 m (6' 4\")  Wt 115.1 kg (253 lb 11.2 oz)  SpO2 97%  BMI 30.88 kg/m2  No acute distress  Heart is regular rate and rhythm  Lungs are clear  Abdomen is soft and nontender  Lower extremities show no significant edema  He is alert and oriented  His affect is normal  Musculoskeletal exam does not reveal any gross abnormalities of his major joints  Dermatologic exam does not reveal any rashes or ecchymoses on exposed areas of skin    Last Basic Metabolic Panel:  Lab Results "   Component Value Date     09/15/2018      Lab Results   Component Value Date    POTASSIUM 4.1 09/15/2018     Lab Results   Component Value Date    CHLORIDE 106 09/15/2018     Lab Results   Component Value Date    BETTIE 8.6 09/15/2018     Lab Results   Component Value Date    CO2 25 09/15/2018     Lab Results   Component Value Date    BUN 11 09/15/2018     Lab Results   Component Value Date    CR 0.86 09/15/2018     Lab Results   Component Value Date     09/15/2018       Lab Results   Component Value Date    TROPI <0.015 09/15/2018    TROPI <0.015 09/14/2018    TROPI <0.015 09/13/2018    TROPI <0.015 09/08/2018    TROPI <0.015 09/08/2018    TROPONIN <0.07 07/26/2005    TROPONIN <0.07 07/25/2005    TROPONIN <0.07 07/25/2005    TROPONIN 0.71 (HH) 07/10/2005    TROPONIN 0.94 (HH) 07/10/2005       Lab Results   Component Value Date    WBC 6.0 09/15/2018     Lab Results   Component Value Date    RBC 4.58 09/15/2018     Lab Results   Component Value Date    HGB 13.5 09/15/2018     Lab Results   Component Value Date    HCT 40.6 09/15/2018     No components found for: MCT  Lab Results   Component Value Date    MCV 89 09/15/2018     Lab Results   Component Value Date    MCH 29.5 09/15/2018     Lab Results   Component Value Date    MCHC 33.3 09/15/2018     Lab Results   Component Value Date    RDW 13.1 09/15/2018     Lab Results   Component Value Date     09/15/2018

## 2018-09-17 NOTE — PLAN OF CARE
Problem: Patient Care Overview  Goal: Plan of Care/Patient Progress Review  Outcome: Adequate for Discharge Date Met: 09/17/18  Patient hospitalized for diabetic foot ulcer. Cleared for discharge to home today per MD. Discharge instructions, medications, and follow-ups reviewed with patient and wife in detail.  Patient states he has enough metoprolol to last until it can be refilled again. Per pharmacist, there is a subscription on file. Patient verbalized understanding of discharge instructions including dressing change orders. Belongings were returned to patient at time of discharge. Wife providing transport home. Midline inplace, education provided. Patient declined walker, states he has one at home.

## 2018-09-17 NOTE — PROVIDER NOTIFICATION
Spoke with [facility] regarding patient not having discharge or follow up orders, was given CONCEPCION Baxter's number, left message.

## 2018-09-17 NOTE — PROGRESS NOTES
S:  POD # 6 s/p I & D 1st MTP plantar ulcer left foot; amputation distal phalanx 3rd toe right foot. Patient reports he is doing well overall.   He is scheduled for a stress test today.  He has had daily dressing changes over the weekend.  He reports no pain.    O: T: 96.2;  P: 77;  HR: 66;  R: 16;  BP: 144/87;  SpO2: 94    Patient is lying in bed with his left flat, not on pillows at this time.  His dressings are clean, dry and intact.  His left foot ulcer continues to heal, there is a dry dressing on this.  His right 3rd toe looks dry, there is no active drainage, no warmth and the erythema looks improved from last week.  The toe is not as swollen at this time.      A/P:  POD #6 s/p I & D 1st MTP plantar ulcer left foot, amputation distal phalanx 3rd toe right foot, doing well overall  3rd toe looks improved, but still at risk for nonhealing.  Ortho will continue to monitor.  At this time no further amputation is required, but it is possible this could change based on patient status, appearance of toe  Patient may heel weightbear as tolerated in post op shoes, should avoid pressure on midfoot/forefoot  Wound care to both wound sites--appreciate wound care team input  Oral medication as needed for pain  Antibiotics per ID team recommendations  Medicine team managing comorbidities  Stress test being done as recommended by cardiology  From ortho perspective patient could discharge when clared by other teams    Flavio Baxter PA-C

## 2018-09-17 NOTE — PROVIDER NOTIFICATION
Web based paged Dr. Baker regarding: Spoke with ortho, made a note in discharge orders regarding follow up appointment and weight bearing status.

## 2018-09-17 NOTE — PROVIDER NOTIFICATION
Web based paged MD regarding: Per tele tech, 2.4 second pause. Lexiscan scheduled for tomorrow. Changes?

## 2018-09-18 ENCOUNTER — PATIENT OUTREACH (OUTPATIENT)
Dept: CARE COORDINATION | Facility: CLINIC | Age: 68
End: 2018-09-18

## 2018-09-18 ENCOUNTER — TELEPHONE (OUTPATIENT)
Dept: FAMILY MEDICINE | Facility: CLINIC | Age: 68
End: 2018-09-18

## 2018-09-18 ENCOUNTER — ALLIED HEALTH/NURSE VISIT (OUTPATIENT)
Dept: INFUSION THERAPY | Facility: CLINIC | Age: 68
End: 2018-09-18
Attending: INTERNAL MEDICINE
Payer: MEDICARE

## 2018-09-18 VITALS
HEART RATE: 87 BPM | DIASTOLIC BLOOD PRESSURE: 87 MMHG | RESPIRATION RATE: 16 BRPM | OXYGEN SATURATION: 96 % | TEMPERATURE: 97.9 F | SYSTOLIC BLOOD PRESSURE: 135 MMHG

## 2018-09-18 DIAGNOSIS — R78.81 BACTEREMIA: Primary | ICD-10-CM

## 2018-09-18 LAB — INTERPRETATION ECG - MUSE: NORMAL

## 2018-09-18 PROCEDURE — 25000128 H RX IP 250 OP 636: Performed by: INTERNAL MEDICINE

## 2018-09-18 PROCEDURE — 96374 THER/PROPH/DIAG INJ IV PUSH: CPT

## 2018-09-18 PROCEDURE — 25000125 ZZHC RX 250: Performed by: INTERNAL MEDICINE

## 2018-09-18 RX ORDER — CEFTRIAXONE SODIUM 2 G
2 VIAL (EA) INJECTION ONCE
Status: COMPLETED | OUTPATIENT
Start: 2018-09-18 | End: 2018-09-18

## 2018-09-18 RX ORDER — CEFTRIAXONE SODIUM 2 G
2 VIAL (EA) INJECTION ONCE
Status: CANCELLED
Start: 2018-09-18 | End: 2018-09-18

## 2018-09-18 RX ADMIN — WATER 2 G: 1 INJECTION INTRAMUSCULAR; INTRAVENOUS; SUBCUTANEOUS at 14:02

## 2018-09-18 NOTE — MR AVS SNAPSHOT
After Visit Summary   9/18/2018    Jayro Elder    MRN: [id number]           Patient Information     Date Of Birth          1950        Visit Information        Provider Department      9/18/2018 1:45 PM RH LAB DRAW 1 [facility]        Today's Diagnoses     Bacteremia    -  1       Follow-ups after your visit        Your next 10 appointments already scheduled     Sep 19, 2018  1:45 PM CDT   injection with RH LAB DRAW 1   [facility] ([facility])    [place] [id number]  [facility] [address]   [phone]            Sep 20, 2018  1:45 PM CDT   injection with RH LAB DRAW 1   [facility] ([facility])    [facility]  [address]  [facility] [address]   [phone]            Sep 21, 2018  1:45 PM CDT   injection with RH LAB DRAW 1   [facility] ([facility])    [facility]  [address]  [facility] [address]   [phone]              Who to contact     If you have questions or need follow up information about today's clinic visit or your schedule please contact [facility] directly at [phone].  Normal or non-critical lab and imaging results will be communicated to you by MyChart, letter or phone within 4 business days after the clinic has received the results. If you do not hear from us within 7 days, please contact the clinic through MyChart or phone. If you have a critical or abnormal lab result, we will notify you by phone as soon as possible.  Submit refill requests through [employer] or call your pharmacy and they will forward the refill request to us. Please allow 3 business days for your refill to be completed.          Additional Information About Your  "Visit        [employer] Information     [employer] lets you send messages to your doctor, view your test results, renew your prescriptions, schedule appointments and more. To sign up, go to [email or web address] . Click on \"Log in\" on the left side of the screen, which will take you to the Welcome page. Then click on \"Sign up Now\" on the right side of the page.     You will be asked to enter the access code listed below, as well as some personal information. Please follow the directions to create your username and password.     Your access code is: [email or web address]  Expires: 2018  4:04 PM     Your access code will  in 90 days. If you need help or a new code, please call your [facility] clinic or [phone].        Care EveryWhere ID     This is your Care EveryWhere ID. This could be used by other organizations to access your [facility] medical records  [id number]        Your Vitals Were     Pulse Temperature Respirations Pulse Oximetry          87 97.9  F (36.6  C) (Oral) 16 96%         Blood Pressure from Last 3 Encounters:   18 135/87   18 (!) 167/97   18 139/87    Weight from Last 3 Encounters:   18 115.1 kg (253 lb 11.2 oz)   18 114.3 kg (252 lb)   18 109.6 kg (241 lb 9.6 oz)              Today, you had the following     No orders found for display       Primary Care Provider Office Phone # Fax #    Davion Cordova PA-C [phone]       [address] [facility] [address]        Equal Access to Services     DANK OLIVEROS : Hadii veronica Miller, wanolanda kendra, qaybta rivera conway. So [facility] [phone].    ATENCIÓN: Si habla español, tiene a renteria disposición servicios gratuitos de asistencia lingüística. Llame al [phone].    We comply with applicable federal civil rights laws and [place] laws. We do not discriminate on the basis of race, color, national origin, age, disability, sex, sexual " orientation, or gender identity.            Thank you!     Thank you for choosing [facility]  for your care. Our goal is always to provide you with excellent care. Hearing back from our patients is one way we can continue to improve our services. Please take a few minutes to complete the written survey that you may receive in the mail after your visit with us. Thank you!             Your Updated Medication List - Protect others around you: Learn how to safely use, store and throw away your medicines at www.disposemymeds.org.          This list is accurate as of 9/18/18  2:49 PM.  Always use your most recent med list.                   Brand Name Dispense Instructions for use Diagnosis    amLODIPine 5 MG tablet    NORVASC    90 tablet    Take 1 tablet (5 mg) by mouth daily    Coronary artery disease involving native coronary artery of native heart with angina pectoris (H)       cefTRIAXone 1 GM vial    ROCEPHIN    600 mL    Inject 2 g (2,000 mg) into the vein daily for 7 days CBC with differential, creatinine, SGOT weekly while on this medication to be faxed to Dr. Zacarias office.    Bacteremia       clopidogrel 75 MG tablet    PLAVIX    90 tablet    Take 1 tablet (75 mg) by mouth daily    Coronary artery disease involving native coronary artery of native heart with other form of angina pectoris (H)       D3 ADULT PO      Take 2,000 Units by mouth every evening        ELIQUIS 5 MG tablet   Generic drug:  apixaban ANTICOAGULANT      Take by mouth 2 times daily        insulin glargine U-300 300 UNIT/ML injection    TOUJEO     Inject 35 Units Subcutaneous 2 times daily        insulin pen needle 31G X 6 MM     100 each    Use  as directed.    Type 2 diabetes mellitus with diabetic peripheral angiopathy without gangrene, unspecified long term insulin use status (H)       isosorbide mononitrate 30 MG 24 hr tablet    IMDUR    90 tablet    Take 1 tablet (30 mg) by mouth daily     Hypertension goal BP (blood pressure) < 140/90       levothyroxine 137 MCG tablet    SYNTHROID/LEVOTHROID    90 tablet    Take 137 mcg by mouth At Bedtime        lisinopril 20 MG tablet    PRINIVIL/ZESTRIL    180 tablet    Take 1 tablet (20 mg) by mouth 2 times daily    Benign essential hypertension       metoprolol succinate 100 MG 24 hr tablet    TOPROL-XL    90 tablet    Take 0.5 tablets (50 mg) by mouth At Bedtime    Coronary artery disease involving native coronary artery of native heart without angina pectoris       nitroGLYcerin 0.4 MG sublingual tablet    NITROSTAT    50 tablet    Place 1 tablet (0.4 mg) under the tongue every 5 minutes as needed for chest pain    Other chest pain       order for DME     1 each    Equipment being ordered: Walker Wheels () and Walker () Treatment Diagnosis: Impaired gait, heel WB bilaterally.    Diabetic ulcer of left midfoot associated with diabetes mellitus of other type, unspecified ulcer stage (H)       pantoprazole 40 MG EC tablet    PROTONIX    30 tablet    Take 1 tablet (40 mg) by mouth every morning (before breakfast)    GERD (gastroesophageal reflux disease)       silver sulfADIAZINE 1 % cream    SILVADENE    50 g    Apply topically daily    Pressure ulcer of toe of right foot, stage 3 (H)       simvastatin 40 MG tablet    ZOCOR    90 tablet    Take 1 tablet (40 mg) by mouth At Bedtime    Coronary artery disease involving native coronary artery of native heart without angina pectoris

## 2018-09-18 NOTE — TELEPHONE ENCOUNTER
Please call patient for Inpatient Discharge f/u 09/17/18. Altered Mental Status, Bacteremia. Ruth Behrens.

## 2018-09-18 NOTE — PROGRESS NOTES
Infusion Nursing Note:  Jayro Elder presents today for Rocephin.    Patient seen by provider today: No   present during visit today: Not Applicable.    Note: Needs labwork drawn 9/19. Discharged from hospital 9/17. Called cherie @ Dr Zacarias' office to get orders to discontinue midline after infusion 9/19. She will fax orders to us.    Intravenous Access:  Midline.    Treatment Conditions:  Not Applicable.      Post Infusion Assessment:  Patient tolerated injection without incident.  Blood return noted pre and post infusion.  Site patent and intact, free from redness, edema or discomfort.  No evidence of extravasations.    Discharge Plan:   Discharge instructions reviewed with: Patient.  Patient discharged in stable condition accompanied by: self. Will return 9/19  Departure Mode: Wheelchair.    Nupur Beck RN

## 2018-09-18 NOTE — LETTER
Health Care Home - Access Care Plan    About Me  Patient Name:  Jayro Elder    YOB: 1950  Age:                             68 year old   Trevor MRN:            [id number] Telephone Information:     Home Phone [phone]   Mobile [phone]       Address:    [address] [place], [place] [address] [address] Email address:  [email or web address]      Emergency Contact(s)  Name Relationship Lgl Grd Work Phone Home Phone Mobile Phone   DHARA AVILA Spouse No [phone]             Health Maintenance: Routine Health maintenance Reviewed: Not assessed    My Access Plan  Medical Emergency 911   Questions or concerns during clinic hours Primary Clinic Line, I will call the clinic directly: [facility] - [phone]   24 Hour Appointment Line [phone] or  [phone] [phone]) (toll free)   24 Hour Nurse Line [phone] (toll free)   Questions or concerns outside clinic hours 24 Hour Appointment Line, I will call the after-hours on-call line:   [facility] [phone] or [phone]) (toll-free)   Preferred Urgent Care [facility] [place], [phone]   Preferred Hospital [facility]  [phone]   Preferred Pharmacy [facility]     Behavioral Health Crisis Line The National Suicide Prevention Lifeline at 1-948.830.2531 or 912     My Care Team Members  Patient Care Team       Relationship Specialty Notifications Start End    Davion Cordova PA-C PCP - General Physician Assistant  4/26/17     Phone: [phone] Fax: [phone]         [address] [place] [address]    Johnathan Mckeon MD MD Cardiology  11/18/13     Comment:  cardiology     Phone: [phone] Fax: [phone] [address] [place] [address]    Kwasi Park MD MD Ophthalmology  12/18/17     Phone: [phone] Fax: [phone]          [address] [facility] [address] [facility] [address]           My Medical and Care Information  Problem List   Patient Active Problem List   Diagnosis     Generalized osteoarthrosis, unspecified site     Tobacco use disorder     Hypertension goal BP (blood pressure) < 140/90     Benign neoplasm of lower jaw bone     Abdominal pain, right upper quadrant     Diabetes mellitus, type 2 (H)     Cardiovascular disease     CARDIOVASCULAR SCREENING; LDL GOAL LESS THAN 100     Health Care Home     Cardiomyopathy (H)     CVA (cerebral vascular accident) (H)     Coronary artery disease involving native coronary artery of native heart with other form of angina pectoris (H)     Status post coronary angiogram     Chronic atrial fibrillation (H)     Homonymous hemianopsia, right     Cellulitis of left lower extremity     Syncope, unspecified syncope type     Diabetic ulcer of left midfoot associated with type 2 diabetes mellitus, with fat layer exposed (H)     Type 2 diabetes mellitus with diabetic peripheral angiopathy without gangrene, unspecified long term insulin use status (H)     Microalbuminuria     Pressure ulcer of toe of right foot, stage 3 (H)     Cellulitis     Bacteremia      Current Medications and Allergies:  See printed Medication Report

## 2018-09-18 NOTE — PROGRESS NOTES
Clinic Care Coordination Contact    Clinical Data:  Patient was hospitalized at [facility] from 09/06/2018 through 09/17/2018    Per 09/18/2018 CTS - see letters tab:  Reason for Communication Hand-off Referral: Difficulty understanding plan of care  Multiple providers/specialties  Non-adherence to plan of care related to:  Other DM management.  Concerned about compliance with dressing changes and weight bear status       Concern for non-adherence with plan of care:YES    Discharge Needs Assessment:  Needs        Most Recent Value     Equipment Currently Used at Home walker, rolling, cane, straight     Transportation Available family or friend will provide     # of Referrals Placed by CTS Home Infusion            Already enrolled in Tele-monitoring program and name of program:  na  Follow-up specialty is recommended: Yes    Follow-up plan:  Future Appointments  Date Time Provider Department Center   9/19/2018 1:45 PM RH LAB DRAW 1 [facility]   9/20/2018 1:45 PM RH LAB DRAW 1 [facility]   9/21/2018 1:45 PM [facility] LAB DRAW 1 [facility]         Any outstanding tests or procedures:           Further instructions from your care team       Plan of care for wound located on Left 1st MTP and 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 Could also use soap and water after discharge. Apply Vaseline gauze, dry 4x4 gauze, Kerlix wrap. Secure with Ace bandage. Change once daily.      [facility] Wednesday 9/19/18 0830. Future follow up appointments will be made at this appointment.       Patient may heel weightbear as tolerated in post op shoes, should avoid pressure on midfoot/forefoot.      Key Recommendations:  Pt is admitted with cellulitis and positive blood cultures.  He was scheduled for an amputation of his toe, but they moved it up to this hospitalization.  His a1c is 9.4.  He is compliant with his glargine.  However, it is expensive, but his endocrinologist gives it  "to him for free.  He has requested financial support from the drug company, but hasn't heard back yet. He isn't a candidate for resources.  He rarely checks his blood glucose since it's hard to get a sample with his calloused fingers.  He stopped taking metformin d/t the side effects of bloating.  He follows with Dr Scott endocrinology and would like to stay with him.   Pt does have WB restrictions.  However, he did declines Home care support: Per PT Therapy note: .  Pt refuses all mobility training and recommendations, states \"It doesn't hurt, I'll be fine\". Refused any further mobility training or practice navigating stairs.  Pt requesting to discontinue all therapy services, refused FWW for home.  Will complete PT orders.  Cardiology saw pt and recommends Xochitl stress test which they recommend f/u with cards and medical management.  ID recommends IV ABX outpatient infusion for 2 more days.  Elevated concern about compliance with dressing changes on foot, dm management and care of self for health management.      Rosy Morrow  ------------------------------------------------------------------------------------------------------------------------------------------------------------------------------------------------------------------------------------------------------------------------------------    *Refer to 09/18/2018 Telephone Encounter - Hospital F/U by Triage RN.  Patient refused Hospital F/U with PCP.    Has Ortho appt on 09/19 + Home Infusion.     UTC/Voicemail    Referral Source: IP Handoff  Clinical Data: Care Coordinator Outreach  Outreach attempted x 1.  Left message on voicemail with call back information and requested return call.    Plan:      Care Coordinator will try to reach patient again in 1-2 business days.    ENROLLMENT STATUS:  Potential     CARE COORDINATOR STATUS: Care team updated today.       Abril Dawson, RN  [facility] Care Coordinator - [place], [place] " and [place] Locations   Direct:  [phone] (voicemail available)   (Today's Date: 09/18/2018)

## 2018-09-18 NOTE — LETTER
[place] CARE COORDINATION    September 27, 2018    Jayro Elder  [address]  [facility] [address]      Dear Jayro,    I am a clinic care coordinator who works with Davion Cordova PA-C at [facility].  I am writing to advise I will no longer be covering the [facility] location moving forward.   Please reach out to the clinic directly if you wish to have further clinic care coordination services ordered by Davion Cordova PA-C and one of our teammates will connect with you. Below is a description of clinic care coordination and how I can further assist you.     The clinic care coordinator is a registered nurse and/or  who understand the health care system. The goal of clinic care coordination is to help you manage your health and improve access to the [place] system in the most efficient manner. The registered nurse can assist you in meeting your health care goals by providing education, coordinating services, and strengthening the communication among your providers. The  can assist you with financial, behavioral, psychosocial, chemical dependency, counseling, and/or psychiatric resources.    Please feel free to contact the [facility] at [phone], with any questions or concerns. We at [place] are focused on providing you with the highest-quality healthcare experience possible and that all starts with you.     Sincerely,     Abril Yañezs    Enclosed: I have enclosed a copy of a 24 Hour Access Plan. This has helpful phone numbers for you to call when needed. Please keep this in an easy to access place to use as needed.

## 2018-09-18 NOTE — PROGRESS NOTES
Transition Communication Hand-off for Care Transitions to Next Level of Care Provider    Name: Jayro Elder  : 1950  MRN #: [id number]  Primary Care Provider: Davion Cordova  Primary Care MD Name: magda cordova  Primary Clinic[facility] [address]  [facility] [address]  [facility]  Reason for Hospitalization:  Cellulitis of foot [L03.119]  Altered mental status, unspecified altered mental status type [R41.82]  Hypothyroidism, unspecified type [E03.9]  Diabetic ulcer of left midfoot associated with diabetes mellitus of other type, unspecified ulcer stage (H) [E13.621, L97.429]  Admit Date/Time: 2018  3:09 PM  Discharge Date: 18  Payor Source: Payor: BCBS / Plan: BCBS PLATINUM BLUE / Product Type: PPO /     Readmission Assessment Measure (ANDRIA) Risk Score/category: ELEVATED           Reason for Communication Hand-off Referral: Difficulty understanding plan of care  Multiple providers/specialties  Non-adherence to plan of care related to:  Other DM management.  Concerned about compliance with dressing changes and weight bear status    Discharge Plan:       Concern for non-adherence with plan of care:   YES  Discharge Needs Assessment:  Needs       Most Recent Value    Equipment Currently Used at Home walker, rolling, cane, straight    Transportation Available family or friend will provide    # of Referrals Placed by [facility]          Already enrolled in Tele-monitoring program and name of program:  na  Follow-up specialty is recommended: Yes    Follow-up plan:  Future Appointments  Date Time Provider Department Center   2018 1:45 PM [facility] LAB DRAW 1 [facility]   2018 1:45 PM [facility] 1 [facility]   2018 1:45 PM [facility] DRAW 1 [facility]       Any outstanding tests or procedures:               Further instructions from your care team      Plan of care for wound located on Left 1st MTP and right 3rd toe amputation site:  "Cleanse with Microklenz, pat dry. Could also use soap and water after discharge. Apply Vaseline gauze, dry 4x4 gauze, Kerlix wrap. Secure with Ace bandage. Change once daily.     [facility] Wednesday 9/19/18 0830. Future follow up appointments will be made at this appointment.      Patient may heel weightbear as tolerated in post op shoes, should avoid pressure on midfoot/forefoot.              Key Recommendations:  Pt is admitted with cellulitis and positive blood cultures.  He was scheduled for an amputation of his toe, but they moved it up to this hospitalization.  His a1c is 9.4.  He is compliant with his glargine.  However, it is expensive, but his endocrinologist gives it to him for free.  He has requested financial support from the drug company, but hasn't heard back yet. He isn't a candidate for resources.  He rarely checks his blood glucose since it's hard to get a sample with his calloused fingers.  He stopped taking metformin d/t the side effects of bloating.  He follows with Dr Scott endocrinology and would like to stay with him.   Pt does have WB restrictions.  However, he did declines Home care support: Per PT Therapy note: .  Pt refuses all mobility training and recommendations, states \"It doesn't hurt, I'll be fine\". Refused any further mobility training or practice navigating stairs.  Pt requesting to discontinue all therapy services, refused FWW for home.  Will complete PT orders.  Cardiology saw pt and recommends Xochitl stress test which they recommend f/u with cards and medical management.  ID recommends IV ABX outpatient infusion for 2 more days.  Elevated concern about compliance with dressing changes on foot, dm management and care of self for health management.     Rosy Morrow    AVS/Discharge Summary is the source of truth; this is a helpful guide for improved communication of patient story  "

## 2018-09-19 ENCOUNTER — HOSPITAL ENCOUNTER (OUTPATIENT)
Facility: CLINIC | Age: 68
Setting detail: SPECIMEN
Discharge: HOME | End: 2018-09-19
Attending: INTERNAL MEDICINE | Admitting: INTERNAL MEDICINE
Payer: MEDICARE

## 2018-09-19 ENCOUNTER — ALLIED HEALTH/NURSE VISIT (OUTPATIENT)
Dept: INFUSION THERAPY | Facility: CLINIC | Age: 68
End: 2018-09-19
Attending: INTERNAL MEDICINE
Payer: MEDICARE

## 2018-09-19 DIAGNOSIS — R78.81 BACTEREMIA: Primary | ICD-10-CM

## 2018-09-19 LAB
AST SERPL W P-5'-P-CCNC: 18 U/L (ref 0–45)
BASOPHILS # BLD AUTO: 0.1 10E9/L (ref 0–0.2)
BASOPHILS NFR BLD AUTO: 0.7 %
CREAT SERPL-MCNC: 1.22 MG/DL (ref 0.66–1.25)
DIFFERENTIAL METHOD BLD: NORMAL
EOSINOPHIL # BLD AUTO: 0.2 10E9/L (ref 0–0.7)
EOSINOPHIL NFR BLD AUTO: 2.9 %
ERYTHROCYTE [DISTWIDTH] IN BLOOD BY AUTOMATED COUNT: 12.8 % (ref 10–15)
GFR SERPL CREATININE-BSD FRML MDRD: 59 ML/MIN/1.7M2
HCT VFR BLD AUTO: 43.2 % (ref 40–53)
HGB BLD-MCNC: 14.2 G/DL (ref 13.3–17.7)
IMM GRANULOCYTES # BLD: 0.1 10E9/L (ref 0–0.4)
IMM GRANULOCYTES NFR BLD: 1 %
LYMPHOCYTES # BLD AUTO: 1.5 10E9/L (ref 0.8–5.3)
LYMPHOCYTES NFR BLD AUTO: 21 %
MCH RBC QN AUTO: 29.5 PG (ref 26.5–33)
MCHC RBC AUTO-ENTMCNC: 32.9 G/DL (ref 31.5–36.5)
MCV RBC AUTO: 90 FL (ref 78–100)
MONOCYTES # BLD AUTO: 0.6 10E9/L (ref 0–1.3)
MONOCYTES NFR BLD AUTO: 8.1 %
NEUTROPHILS # BLD AUTO: 4.8 10E9/L (ref 1.6–8.3)
NEUTROPHILS NFR BLD AUTO: 66.3 %
NRBC # BLD AUTO: 0 10*3/UL
NRBC BLD AUTO-RTO: 0 /100
PLATELET # BLD AUTO: 268 10E9/L (ref 150–450)
RBC # BLD AUTO: 4.81 10E12/L (ref 4.4–5.9)
WBC # BLD AUTO: 7.2 10E9/L (ref 4–11)

## 2018-09-19 PROCEDURE — 82565 ASSAY OF CREATININE: CPT | Performed by: INTERNAL MEDICINE

## 2018-09-19 PROCEDURE — 25000128 H RX IP 250 OP 636: Performed by: INTERNAL MEDICINE

## 2018-09-19 PROCEDURE — 96374 THER/PROPH/DIAG INJ IV PUSH: CPT

## 2018-09-19 PROCEDURE — 85025 COMPLETE CBC W/AUTO DIFF WBC: CPT | Performed by: INTERNAL MEDICINE

## 2018-09-19 PROCEDURE — 25000125 ZZHC RX 250: Performed by: INTERNAL MEDICINE

## 2018-09-19 PROCEDURE — 84450 TRANSFERASE (AST) (SGOT): CPT | Performed by: INTERNAL MEDICINE

## 2018-09-19 RX ORDER — CEFTRIAXONE SODIUM 2 G
2 VIAL (EA) INJECTION ONCE
Status: COMPLETED | OUTPATIENT
Start: 2018-09-19 | End: 2018-09-19

## 2018-09-19 RX ORDER — CEFTRIAXONE SODIUM 2 G
2 VIAL (EA) INJECTION ONCE
Status: CANCELLED
Start: 2018-09-19 | End: 2018-09-19

## 2018-09-19 RX ADMIN — WATER 2 G: 1 INJECTION INTRAMUSCULAR; INTRAVENOUS; SUBCUTANEOUS at 13:05

## 2018-09-19 NOTE — MR AVS SNAPSHOT
After Visit Summary   9/19/2018    Jayro Elder    MRN: [id number]           Patient Information     Date Of Birth          1950        Visit Information        Provider Department      9/19/2018 1:45 PM RH LAB DRAW 1 [facility]        Today's Diagnoses     Bacteremia    -  1       Follow-ups after your visit        Your next 10 appointments already scheduled     Sep 19, 2018  1:45 PM CDT   injection with RH LAB DRAW 1   [facility] ([facility])    [place] [id number]  [facility] [address]   [phone]            Sep 20, 2018  1:45 PM CDT   injection with RH LAB DRAW 1   [facility] ([facility])    [facility]  [address]  [facility] [address]   [phone]            Sep 21, 2018  1:45 PM CDT   injection with RH LAB DRAW 1   [facility] ([facility])    [facility]  [address]  [facility] [address]   [phone]              Who to contact     If you have questions or need follow up information about today's clinic visit or your schedule please contact [facility] directly at [phone].  Normal or non-critical lab and imaging results will be communicated to you by MyChart, letter or phone within 4 business days after the clinic has received the results. If you do not hear from us within 7 days, please contact the clinic through MyChart or phone. If you have a critical or abnormal lab result, we will notify you by phone as soon as possible.  Submit refill requests through [employer] or call your pharmacy and they will forward the refill request to us. Please allow 3 business days for your refill to be completed.          Additional Information About Your  "Visit        Acquisiohart Information     [employer] lets you send messages to your doctor, view your test results, renew your prescriptions, schedule appointments and more. To sign up, go to [email or web address] . Click on \"Log in\" on the left side of the screen, which will take you to the Welcome page. Then click on \"Sign up Now\" on the right side of the page.     You will be asked to enter the access code listed below, as well as some personal information. Please follow the directions to create your username and password.     Your access code is: [email or web address]  Expires: 2018  4:04 PM     Your access code will  in 90 days. If you need help or a new code, please call your [place] clinic or [phone].        Care EveryWhere ID     This is your Care EveryWhere ID. This could be used by other organizations to access your [place] medical records  [id number]         Blood Pressure from Last 3 Encounters:   18 135/87   18 (!) 167/97   18 139/87    Weight from Last 3 Encounters:   18 115.1 kg (253 lb 11.2 oz)   18 114.3 kg (252 lb)   18 109.6 kg (241 lb 9.6 oz)              We Performed the Following     AST     CBC with platelets differential     Creatinine        Primary Care Provider Office Phone # Fax #    Davion Miki Cordova PA-C [phone] [address] [facility] [address]        Equal Access to Services     LAHTA OLIVEROS : Hadii aad ku hadasho Soomaali, waaxda luqadaha, qaybta kaalmada adeegyada, rivera monet . So [facility] [phone].    ATENCIÓN: Si habla español, tiene a renteria disposición servicios gratuitos de asistencia lingüística. Llame al [phone].    We comply with applicable federal civil rights laws and [place] laws. We do not discriminate on the basis of race, color, national origin, age, disability, sex, sexual orientation, or gender identity.            Thank you!     Thank you for choosing [facility]  for your care. Our goal is always to provide you with excellent care. Hearing back from our patients is one way we can continue to improve our services. Please take a few minutes to complete the written survey that you may receive in the mail after your visit with us. Thank you!             Your Updated Medication List - Protect others around you: Learn how to safely use, store and throw away your medicines at www.disposemymeds.org.          This list is accurate as of 9/19/18  1:36 PM.  Always use your most recent med list.                   Brand Name Dispense Instructions for use Diagnosis    amLODIPine 5 MG tablet    NORVASC    90 tablet    Take 1 tablet (5 mg) by mouth daily    Coronary artery disease involving native coronary artery of native heart with angina pectoris (H)       cefTRIAXone 1 GM vial    ROCEPHIN    600 mL    Inject 2 g (2,000 mg) into the vein daily for 7 days CBC with differential, creatinine, SGOT weekly while on this medication to be faxed to Dr. Zacarias office.    Bacteremia       clopidogrel 75 MG tablet    PLAVIX    90 tablet    Take 1 tablet (75 mg) by mouth daily    Coronary artery disease involving native coronary artery of native heart with other form of angina pectoris (H)       D3 ADULT PO      Take 2,000 Units by mouth every evening        ELIQUIS 5 MG tablet   Generic drug:  apixaban ANTICOAGULANT      Take by mouth 2 times daily        insulin glargine U-300 300 UNIT/ML injection    TOUJEO     Inject 35 Units Subcutaneous 2 times daily        insulin pen needle 31G X 6 MM     100 each    Use  as directed.    Type 2 diabetes mellitus with diabetic peripheral angiopathy without gangrene, unspecified long term insulin use status (H)       isosorbide mononitrate 30 MG 24 hr tablet    IMDUR    90 tablet    Take 1 tablet (30 mg) by mouth daily    Hypertension goal BP (blood pressure) < 140/90       levothyroxine 137 MCG tablet     SYNTHROID/LEVOTHROID    90 tablet    Take 137 mcg by mouth At Bedtime        lisinopril 20 MG tablet    PRINIVIL/ZESTRIL    180 tablet    Take 1 tablet (20 mg) by mouth 2 times daily    Benign essential hypertension       metoprolol succinate 100 MG 24 hr tablet    TOPROL-XL    90 tablet    Take 0.5 tablets (50 mg) by mouth At Bedtime    Coronary artery disease involving native coronary artery of native heart without angina pectoris       nitroGLYcerin 0.4 MG sublingual tablet    NITROSTAT    50 tablet    Place 1 tablet (0.4 mg) under the tongue every 5 minutes as needed for chest pain    Other chest pain       order for DME     1 each    Equipment being ordered: Walker Wheels () and Walker () Treatment Diagnosis: Impaired gait, heel WB bilaterally.    Diabetic ulcer of left midfoot associated with diabetes mellitus of other type, unspecified ulcer stage (H)       pantoprazole 40 MG EC tablet    PROTONIX    30 tablet    Take 1 tablet (40 mg) by mouth every morning (before breakfast)    GERD (gastroesophageal reflux disease)       silver sulfADIAZINE 1 % cream    SILVADENE    50 g    Apply topically daily    Pressure ulcer of toe of right foot, stage 3 (H)       simvastatin 40 MG tablet    ZOCOR    90 tablet    Take 1 tablet (40 mg) by mouth At Bedtime    Coronary artery disease involving native coronary artery of native heart without angina pectoris

## 2018-09-19 NOTE — PROGRESS NOTES
Infusion Nursing Note:  Jayro MESSINA Shannonzahra presents today for Rocephin and midline d/c.    Patient seen by provider today: No   present during visit today: Not Applicable.    Note: N/A.    Intravenous Access:  Midline.    Treatment Conditions:  Not Applicable.      Post Infusion Assessment:  Patient tolerated infusion without incident.  Blood return noted pre and post infusion.  Site patent and intact, free from redness, edema or discomfort.  No evidence of extravasations.  Access discontinued per protocol.    Discharge Plan:   Discharge instructions reviewed with: Patient.  Patient and/or family verbalized understanding of discharge instructions and all questions answered.  Patient discharged in stable condition accompanied by: self.  Departure Mode: Wheelchair.    Alis Velazco RN

## 2018-09-27 NOTE — PROGRESS NOTES
Clinic Care Coordination Contact  [facility]/Voicemail    Referral Source: PCP  Clinical Data: Care Coordinator Outreach  Outreach attempted x 3.   Plan:     Care Coordinator will mail out care coordination introduction letter with care coordinator contact information and explanation of care coordination services.     Care Coordinator will do no further outreaches at this time.    Closing to clinic care coordination services with writer at this time    Please note, [facility] has a new clinical care coordination team in place (both CCRN and CCSW roles).   Moving forward, should care coordination needs be identified, will defer to this new team for further follow-up.     ENROLLMENT STATUS:  Not a Candidate    CARE COORDINATOR STATUS: Care team updated today.      Abril Dawson, RN  [facility] Care Coordinator - Prior [facility] Locations   Direct:  [phone] (voicemail available)   (Today's Date: 09/27/2018)

## 2018-11-26 DIAGNOSIS — I10 HYPERTENSION GOAL BP (BLOOD PRESSURE) < 140/90: ICD-10-CM

## 2018-11-26 DIAGNOSIS — I25.10 CORONARY ARTERY DISEASE INVOLVING NATIVE CORONARY ARTERY OF NATIVE HEART WITHOUT ANGINA PECTORIS: ICD-10-CM

## 2018-11-26 DIAGNOSIS — I10 BENIGN ESSENTIAL HYPERTENSION: ICD-10-CM

## 2018-11-26 RX ORDER — ISOSORBIDE MONONITRATE 30 MG/1
30 TABLET, EXTENDED RELEASE ORAL DAILY
Qty: 90 TABLET | Refills: 0 | Status: SHIPPED | OUTPATIENT
Start: 2018-11-26 | End: 2019-03-01

## 2018-11-26 RX ORDER — LISINOPRIL 20 MG/1
20 TABLET ORAL 2 TIMES DAILY
Qty: 180 TABLET | Refills: 0 | Status: ON HOLD | OUTPATIENT
Start: 2018-11-26 | End: 2019-01-14

## 2018-11-30 DIAGNOSIS — I25.119 CORONARY ARTERY DISEASE INVOLVING NATIVE CORONARY ARTERY OF NATIVE HEART WITH ANGINA PECTORIS (H): ICD-10-CM

## 2018-11-30 RX ORDER — AMLODIPINE BESYLATE 5 MG/1
5 TABLET ORAL DAILY
Qty: 90 TABLET | Refills: 0 | Status: ON HOLD | OUTPATIENT
Start: 2018-11-30 | End: 2019-01-14

## 2019-01-05 ENCOUNTER — HOSPITAL ENCOUNTER (INPATIENT)
Facility: CLINIC | Age: 69
LOS: 9 days | Discharge: HOME | DRG: 255 | End: 2019-01-14
Attending: EMERGENCY MEDICINE | Admitting: INTERNAL MEDICINE
Payer: COMMERCIAL

## 2019-01-05 ENCOUNTER — APPOINTMENT (OUTPATIENT)
Dept: GENERAL RADIOLOGY | Facility: CLINIC | Age: 69
DRG: 255 | End: 2019-01-05
Payer: COMMERCIAL

## 2019-01-05 DIAGNOSIS — Z79.4 TYPE 2 DIABETES MELLITUS WITH DIABETIC NEPHROPATHY, WITH LONG-TERM CURRENT USE OF INSULIN (H): ICD-10-CM

## 2019-01-05 DIAGNOSIS — I25.119 CORONARY ARTERY DISEASE INVOLVING NATIVE CORONARY ARTERY OF NATIVE HEART WITH ANGINA PECTORIS (H): ICD-10-CM

## 2019-01-05 DIAGNOSIS — I48.20 CHRONIC ATRIAL FIBRILLATION (H): ICD-10-CM

## 2019-01-05 DIAGNOSIS — Z89.421 S/P AMPUTATION OF LESSER TOE, RIGHT (H): Primary | ICD-10-CM

## 2019-01-05 DIAGNOSIS — L03.90 CELLULITIS: ICD-10-CM

## 2019-01-05 DIAGNOSIS — L03.115 CELLULITIS OF RIGHT LOWER LEG: ICD-10-CM

## 2019-01-05 DIAGNOSIS — I10 BENIGN ESSENTIAL HYPERTENSION: ICD-10-CM

## 2019-01-05 DIAGNOSIS — E11.21 TYPE 2 DIABETES MELLITUS WITH DIABETIC NEPHROPATHY, WITH LONG-TERM CURRENT USE OF INSULIN (H): ICD-10-CM

## 2019-01-05 LAB
ALBUMIN SERPL-MCNC: 3.2 G/DL (ref 3.4–5)
ALP SERPL-CCNC: 82 U/L (ref 40–150)
ALT SERPL W P-5'-P-CCNC: 29 U/L (ref 0–70)
ANION GAP SERPL CALCULATED.3IONS-SCNC: 10 MMOL/L (ref 3–14)
AST SERPL W P-5'-P-CCNC: 16 U/L (ref 0–45)
BASOPHILS # BLD AUTO: 0 10E9/L (ref 0–0.2)
BASOPHILS NFR BLD AUTO: 0.3 %
BILIRUB SERPL-MCNC: 1.6 MG/DL (ref 0.2–1.3)
BUN SERPL-MCNC: 16 MG/DL (ref 7–30)
CALCIUM SERPL-MCNC: 8.6 MG/DL (ref 8.5–10.1)
CHLORIDE SERPL-SCNC: 99 MMOL/L (ref 94–109)
CO2 SERPL-SCNC: 25 MMOL/L (ref 20–32)
CREAT SERPL-MCNC: 0.79 MG/DL (ref 0.66–1.25)
DIFFERENTIAL METHOD BLD: ABNORMAL
EOSINOPHIL # BLD AUTO: 0.1 10E9/L (ref 0–0.7)
EOSINOPHIL NFR BLD AUTO: 1.1 %
ERYTHROCYTE [DISTWIDTH] IN BLOOD BY AUTOMATED COUNT: 13 % (ref 10–15)
GFR SERPL CREATININE-BSD FRML MDRD: >90 ML/MIN/{1.73_M2}
GLUCOSE BLDC GLUCOMTR-MCNC: 260 MG/DL (ref 70–99)
GLUCOSE BLDC GLUCOMTR-MCNC: 346 MG/DL (ref 70–99)
GLUCOSE SERPL-MCNC: 346 MG/DL (ref 70–99)
HBA1C MFR BLD: 11.7 % (ref 0–5.6)
HCT VFR BLD AUTO: 39.6 % (ref 40–53)
HGB BLD-MCNC: 13.2 G/DL (ref 13.3–17.7)
IMM GRANULOCYTES # BLD: 0 10E9/L (ref 0–0.4)
IMM GRANULOCYTES NFR BLD: 0.1 %
LACTATE BLD-SCNC: 1.5 MMOL/L (ref 0.7–2)
LYMPHOCYTES # BLD AUTO: 1 10E9/L (ref 0.8–5.3)
LYMPHOCYTES NFR BLD AUTO: 13.5 %
MCH RBC QN AUTO: 28.9 PG (ref 26.5–33)
MCHC RBC AUTO-ENTMCNC: 33.3 G/DL (ref 31.5–36.5)
MCV RBC AUTO: 87 FL (ref 78–100)
MONOCYTES # BLD AUTO: 0.8 10E9/L (ref 0–1.3)
MONOCYTES NFR BLD AUTO: 10.5 %
NEUTROPHILS # BLD AUTO: 5.3 10E9/L (ref 1.6–8.3)
NEUTROPHILS NFR BLD AUTO: 74.5 %
NRBC # BLD AUTO: 0 10*3/UL
NRBC BLD AUTO-RTO: 0 /100
PLATELET # BLD AUTO: 182 10E9/L (ref 150–450)
POTASSIUM SERPL-SCNC: 3.7 MMOL/L (ref 3.4–5.3)
PROT SERPL-MCNC: 6.8 G/DL (ref 6.8–8.8)
RBC # BLD AUTO: 4.56 10E12/L (ref 4.4–5.9)
SODIUM SERPL-SCNC: 133 MMOL/L (ref 133–144)
WBC # BLD AUTO: 7.1 10E9/L (ref 4–11)

## 2019-01-05 PROCEDURE — 36415 COLL VENOUS BLD VENIPUNCTURE: CPT | Performed by: EMERGENCY MEDICINE

## 2019-01-05 PROCEDURE — A9270 NON-COVERED ITEM OR SERVICE: HCPCS | Mod: GY | Performed by: INTERNAL MEDICINE

## 2019-01-05 PROCEDURE — 25000128 H RX IP 250 OP 636: Performed by: EMERGENCY MEDICINE

## 2019-01-05 PROCEDURE — 25000132 ZZH RX MED GY IP 250 OP 250 PS 637: Performed by: INTERNAL MEDICINE

## 2019-01-05 PROCEDURE — 12000000 ZZH R&B MED SURG/OB

## 2019-01-05 PROCEDURE — 83605 ASSAY OF LACTIC ACID: CPT | Performed by: EMERGENCY MEDICINE

## 2019-01-05 PROCEDURE — 87040 BLOOD CULTURE FOR BACTERIA: CPT | Performed by: EMERGENCY MEDICINE

## 2019-01-05 PROCEDURE — 25000131 ZZH RX MED GY IP 250 OP 636 PS 637: Mod: GY | Performed by: INTERNAL MEDICINE

## 2019-01-05 PROCEDURE — 99285 EMERGENCY DEPT VISIT HI MDM: CPT | Mod: 25

## 2019-01-05 PROCEDURE — 83036 HEMOGLOBIN GLYCOSYLATED A1C: CPT | Performed by: EMERGENCY MEDICINE

## 2019-01-05 PROCEDURE — 96367 TX/PROPH/DG ADDL SEQ IV INF: CPT

## 2019-01-05 PROCEDURE — 25000128 H RX IP 250 OP 636: Performed by: INTERNAL MEDICINE

## 2019-01-05 PROCEDURE — 85025 COMPLETE CBC W/AUTO DIFF WBC: CPT | Performed by: EMERGENCY MEDICINE

## 2019-01-05 PROCEDURE — 96365 THER/PROPH/DIAG IV INF INIT: CPT

## 2019-01-05 PROCEDURE — 99223 1ST HOSP IP/OBS HIGH 75: CPT | Mod: AI | Performed by: INTERNAL MEDICINE

## 2019-01-05 PROCEDURE — 00000146 ZZHCL STATISTIC GLUCOSE BY METER IP

## 2019-01-05 PROCEDURE — 80053 COMPREHEN METABOLIC PANEL: CPT | Performed by: EMERGENCY MEDICINE

## 2019-01-05 PROCEDURE — 73660 X-RAY EXAM OF TOE(S): CPT | Mod: RT

## 2019-01-05 RX ORDER — ONDANSETRON 4 MG/1
4 TABLET, ORALLY DISINTEGRATING ORAL EVERY 6 HOURS PRN
Status: DISCONTINUED | OUTPATIENT
Start: 2019-01-05 | End: 2019-01-14 | Stop reason: HOSPADM

## 2019-01-05 RX ORDER — DEXTROSE MONOHYDRATE 25 G/50ML
25-50 INJECTION, SOLUTION INTRAVENOUS
Status: DISCONTINUED | OUTPATIENT
Start: 2019-01-05 | End: 2019-01-14 | Stop reason: HOSPADM

## 2019-01-05 RX ORDER — AMOXICILLIN 250 MG
2 CAPSULE ORAL 2 TIMES DAILY PRN
Status: DISCONTINUED | OUTPATIENT
Start: 2019-01-05 | End: 2019-01-14 | Stop reason: HOSPADM

## 2019-01-05 RX ORDER — POTASSIUM CHLORIDE 1500 MG/1
20-40 TABLET, EXTENDED RELEASE ORAL
Status: DISCONTINUED | OUTPATIENT
Start: 2019-01-05 | End: 2019-01-14 | Stop reason: HOSPADM

## 2019-01-05 RX ORDER — ONDANSETRON 2 MG/ML
4 INJECTION INTRAMUSCULAR; INTRAVENOUS EVERY 6 HOURS PRN
Status: DISCONTINUED | OUTPATIENT
Start: 2019-01-05 | End: 2019-01-14 | Stop reason: HOSPADM

## 2019-01-05 RX ORDER — POTASSIUM CL/LIDO/0.9 % NACL 10MEQ/0.1L
10 INTRAVENOUS SOLUTION, PIGGYBACK (ML) INTRAVENOUS
Status: DISCONTINUED | OUTPATIENT
Start: 2019-01-05 | End: 2019-01-14 | Stop reason: HOSPADM

## 2019-01-05 RX ORDER — AMLODIPINE BESYLATE 5 MG/1
5 TABLET ORAL DAILY
Status: DISCONTINUED | OUTPATIENT
Start: 2019-01-05 | End: 2019-01-12

## 2019-01-05 RX ORDER — CEFAZOLIN SODIUM 1 G/50ML
1250 SOLUTION INTRAVENOUS ONCE
Status: COMPLETED | OUTPATIENT
Start: 2019-01-05 | End: 2019-01-05

## 2019-01-05 RX ORDER — LEVOTHYROXINE SODIUM 137 UG/1
137 TABLET ORAL AT BEDTIME
Status: DISCONTINUED | OUTPATIENT
Start: 2019-01-05 | End: 2019-01-14 | Stop reason: HOSPADM

## 2019-01-05 RX ORDER — AMOXICILLIN 250 MG
1 CAPSULE ORAL 2 TIMES DAILY PRN
Status: DISCONTINUED | OUTPATIENT
Start: 2019-01-05 | End: 2019-01-14 | Stop reason: HOSPADM

## 2019-01-05 RX ORDER — POTASSIUM CHLORIDE 1.5 G/1.58G
20-40 POWDER, FOR SOLUTION ORAL
Status: DISCONTINUED | OUTPATIENT
Start: 2019-01-05 | End: 2019-01-14 | Stop reason: HOSPADM

## 2019-01-05 RX ORDER — POTASSIUM CHLORIDE 29.8 MG/ML
20 INJECTION INTRAVENOUS
Status: DISCONTINUED | OUTPATIENT
Start: 2019-01-05 | End: 2019-01-14 | Stop reason: HOSPADM

## 2019-01-05 RX ORDER — ACETAMINOPHEN 325 MG/1
650 TABLET ORAL EVERY 4 HOURS PRN
Status: DISCONTINUED | OUTPATIENT
Start: 2019-01-05 | End: 2019-01-14 | Stop reason: HOSPADM

## 2019-01-05 RX ORDER — NITROGLYCERIN 0.4 MG/1
0.4 TABLET SUBLINGUAL EVERY 5 MIN PRN
Status: DISCONTINUED | OUTPATIENT
Start: 2019-01-05 | End: 2019-01-14 | Stop reason: HOSPADM

## 2019-01-05 RX ORDER — POTASSIUM CHLORIDE 7.45 MG/ML
10 INJECTION INTRAVENOUS
Status: DISCONTINUED | OUTPATIENT
Start: 2019-01-05 | End: 2019-01-14 | Stop reason: HOSPADM

## 2019-01-05 RX ORDER — ISOSORBIDE MONONITRATE 30 MG/1
30 TABLET, EXTENDED RELEASE ORAL DAILY
Status: DISCONTINUED | OUTPATIENT
Start: 2019-01-05 | End: 2019-01-14 | Stop reason: HOSPADM

## 2019-01-05 RX ORDER — LISINOPRIL 20 MG/1
20 TABLET ORAL 2 TIMES DAILY
Status: DISCONTINUED | OUTPATIENT
Start: 2019-01-05 | End: 2019-01-09

## 2019-01-05 RX ORDER — OXYCODONE HYDROCHLORIDE 5 MG/1
5 TABLET ORAL EVERY 6 HOURS PRN
Status: DISCONTINUED | OUTPATIENT
Start: 2019-01-05 | End: 2019-01-06

## 2019-01-05 RX ORDER — NICOTINE POLACRILEX 4 MG
15-30 LOZENGE BUCCAL
Status: DISCONTINUED | OUTPATIENT
Start: 2019-01-05 | End: 2019-01-14 | Stop reason: HOSPADM

## 2019-01-05 RX ORDER — PANTOPRAZOLE SODIUM 40 MG/1
40 TABLET, DELAYED RELEASE ORAL
Status: DISCONTINUED | OUTPATIENT
Start: 2019-01-06 | End: 2019-01-14 | Stop reason: HOSPADM

## 2019-01-05 RX ORDER — NALOXONE HYDROCHLORIDE 0.4 MG/ML
.1-.4 INJECTION, SOLUTION INTRAMUSCULAR; INTRAVENOUS; SUBCUTANEOUS
Status: DISCONTINUED | OUTPATIENT
Start: 2019-01-05 | End: 2019-01-14 | Stop reason: HOSPADM

## 2019-01-05 RX ORDER — METOPROLOL SUCCINATE 50 MG/1
50 TABLET, EXTENDED RELEASE ORAL AT BEDTIME
Status: DISCONTINUED | OUTPATIENT
Start: 2019-01-05 | End: 2019-01-14 | Stop reason: HOSPADM

## 2019-01-05 RX ORDER — SIMVASTATIN 40 MG
40 TABLET ORAL AT BEDTIME
Status: DISCONTINUED | OUTPATIENT
Start: 2019-01-05 | End: 2019-01-14 | Stop reason: HOSPADM

## 2019-01-05 RX ADMIN — LISINOPRIL 20 MG: 20 TABLET ORAL at 21:38

## 2019-01-05 RX ADMIN — SODIUM CHLORIDE 1000 ML: 9 INJECTION, SOLUTION INTRAVENOUS at 16:00

## 2019-01-05 RX ADMIN — AMLODIPINE BESYLATE 5 MG: 5 TABLET ORAL at 18:55

## 2019-01-05 RX ADMIN — SIMVASTATIN 40 MG: 40 TABLET, FILM COATED ORAL at 21:38

## 2019-01-05 RX ADMIN — INSULIN ASPART 5 UNITS: 100 INJECTION, SOLUTION INTRAVENOUS; SUBCUTANEOUS at 19:19

## 2019-01-05 RX ADMIN — ISOSORBIDE MONONITRATE 30 MG: 30 TABLET, EXTENDED RELEASE ORAL at 18:55

## 2019-01-05 RX ADMIN — TAZOBACTAM SODIUM AND PIPERACILLIN SODIUM 4.5 G: 500; 4 INJECTION, SOLUTION INTRAVENOUS at 16:00

## 2019-01-05 RX ADMIN — METOPROLOL SUCCINATE 50 MG: 50 TABLET, EXTENDED RELEASE ORAL at 21:38

## 2019-01-05 RX ADMIN — ACETAMINOPHEN 650 MG: 325 TABLET, FILM COATED ORAL at 18:39

## 2019-01-05 RX ADMIN — LEVOTHYROXINE SODIUM 137 MCG: 137 TABLET ORAL at 21:38

## 2019-01-05 RX ADMIN — TAZOBACTAM SODIUM AND PIPERACILLIN SODIUM 3.38 G: 375; 3 INJECTION, SOLUTION INTRAVENOUS at 21:50

## 2019-01-05 RX ADMIN — INSULIN GLARGINE 35 UNITS: 300 INJECTION, SOLUTION SUBCUTANEOUS at 21:39

## 2019-01-05 RX ADMIN — VITAMIN D, TAB 1000IU (100/BT) 2000 UNITS: 25 TAB at 21:38

## 2019-01-05 RX ADMIN — VANCOMYCIN HYDROCHLORIDE 1250 MG: 5 INJECTION, POWDER, LYOPHILIZED, FOR SOLUTION INTRAVENOUS at 17:40

## 2019-01-05 RX ADMIN — APIXABAN 5 MG: 5 TABLET, FILM COATED ORAL at 21:38

## 2019-01-05 ASSESSMENT — MIFFLIN-ST. JEOR: SCORE: 2005.49

## 2019-01-05 ASSESSMENT — ENCOUNTER SYMPTOMS
COLOR CHANGE: 1
DIARRHEA: 1
NAUSEA: 1
ROS SKIN COMMENTS: SWELLING
BLOOD IN STOOL: 0
WOUND: 1
SORE THROAT: 1

## 2019-01-05 ASSESSMENT — ACTIVITIES OF DAILY LIVING (ADL): ADLS_ACUITY_SCORE: 16

## 2019-01-05 NOTE — ED PROVIDER NOTES
History     Chief Complaint:  Cellulitis    HPI   Jayro Elder is a 68 year old male with a history of cellulitis, diabetes, and neuropathy who is on Eliquis who presents with cellulitis. The wife states that this morning the patient has had swelling and bleeding on the second toe of his right foot. The patient states that when he lifted up his toe to look at it and it was squirting blood, so they went to [facility]. The wife states they were at [facility] today around 1130 and were sent to the ED for his cellulitis. The wife states that since that time the redness has spread up his leg as has the swelling. The wife notes the patient has been sick since New Years. The patient states that he has had nausea, diarrhea, and a sore throat. The patient denies blood in the stools.    Allergies:  No known allergies.      Medications:    Amlodipine  Eliquis  Plavix  Insulin  Synthroid  Metoprolol  Nitroglycerin  Protonix    Past Medical History:    CAD (coronary artery disease)   Cellulitis   Cancer    Cardiomyopathy   Cellulitis of left lower extremity   Cerebral infarction    Diabetic ulcer of left midfoot   Essential hypertension, benign   Generalized osteoarthrosis, unspecified site   Microalbuminuria   Myocardial infarction    Neuropathy   Sepsis    Sleep apnea   Substance abuse    Syncope, unspecified syncope type   Thyroid disease   Tobacco use disorder   Type 2 diabetes mellitus with diabetic peripheral angiopathy without gangrene, unspecified long term insulin use status     Past Surgical History:    Back surgery  Laminectomy  Restenosis, stent  Angiogram x3    Family History:    Sister; colorectal cancer  Brother: thyroid disease, cardiovascular disease, diabetes  Father: cancer  Mother: arthritis, thyroid disease    Social History:  The patient was accompanied to the ED by wife.  Smoking Status: former smoker  Smokeless Tobacco: Never Used  Alcohol Use: Positive  Marital Status:        Review of Systems   HENT:  "Positive for sore throat.    Gastrointestinal: Positive for diarrhea and nausea. Negative for blood in stool.   Skin: Positive for color change and wound.        swelling   All other systems reviewed and are negative.      Physical Exam     Patient Vitals for the past 24 hrs:   BP Temp Temp src Pulse Resp SpO2 Height Weight   01/05/19 1333 128/82 98.3  F (36.8  C) Temporal 88 20 98 % 1.93 m (6' 4\") 113.4 kg (250 lb)     Physical Exam  Eyes:               Sclera white; Pupils are equal and round  ENT:                External ears and nares normal  CV:                  Rate as above with regular rhythm   Resp:               Breath sounds clear and equal bilaterally  GI:                   Abdomen is soft, non-tender, non-distended  MS:                  Moves all extremities  Skin:                Warm and dry, cellulitis foot and lower leg right, markedly swollen and bruised 2nd toe w/exposed tissue after epidermis came off at home  Neuro:             Speech is normal and fluent. No apparent deficit.    Emergency Department Course   Imaging:  Radiology findings were communicated with the patient who voiced understanding of the findings.    XR Toe Right G/E 2 Views  No fracture or osseous lesion is seen. There is prominent  hallux valgus with moderate to advanced degenerative changes of the  first metatarsophalangeal joint. No other significant joint space  pathology is seen. There is prominent soft tissue swelling surrounding  the distal phalanx of the second toe. Small areas of decreased  attenuation distally on the frontal view may represent gas or a soft  tissue defect. Clinical correlation is recommended. No underlying  cortical erosive changes are seen to suggest osteomyelitis. No other  abnormality is seen.  TALHA OTTO MD  Reading per radiology    Laboratory:  Laboratory findings were communicated with the patient who voiced understanding of the findings.    CBC: WBC 7.1, HGB 13.2(L),   CMP: " glucose 346(H), bilirubin 1.6(H), albumin 3.2(L)  o/w WNL (Creatinine 0.79)  Lactic acid: 1.5  Blood culture: pending x2    Interventions:  1600 NS 1000 mL IV  1600 Zosyn 4.5 g IV  1740 Vancomycin 1250 mg iV    Emergency Department Course:    1454 Nursing notes and vitals reviewed.    1500 I performed an exam of the patient as documented above.     1531 The patient was sent for a XR Toe Right while in the emergency department, results above.     1559 IV was inserted and blood was drawn for laboratory testing, results above.    1640 I spoke with Dr. Dalton of the Hospitalist service from [facility] regarding patient's presentation, findings, and plan of care.    1705 I personally reviewed the laboratory and imaging results with the patient and answered all related questions prior to admittance.    Impression & Plan      Medical Decision Making:  Jayro Elder is a 68 year old male who presents to the emergency department today for evaluation of cellulitis that has been progressive over the course of today. This appears to be originating on the second toe of the right foot which appears purplish in discoloration and the epidermal layer has sluffed off. I told him this is concerning and this toe may ultimately be amputated. Blood work is not consistent with sepsis, and I am not suspicious at this time of necrotizing fascitis. Xray was reviewed with the read of a possible soft tissue deformity vs maybe gas in the soft tissues. Abnormalities in xray correspond to his thickened callus on his toe and the area of un samuel. I do not suspect underlying gas organism. He was started on antibiotics and admission arranged.      Diagnosis:    ICD-10-CM    1. Cellulitis L03.90       Disposition:   The patient is admitted into the care of Dr. Dalton.    Scribe Disclosure:  I, Mariana Freeman, am serving as a scribe at 2:46 PM on 1/5/2019 to document services personally performed by Elsie Wilkins MD based on my  observations and the provider's statements to me.  [facility] EMERGENCY DEPARTMENT       Elsie Wilkins MD  01/06/19 8498

## 2019-01-05 NOTE — LETTER
"Transition Communication Hand-off for Care Transitions to Next Level of Care Provider    Name: Jayro Elder  : 1950  MRN #: [id number]  Primary Care Provider: Davion Cordova  Primary Care MD Name: GABBY   Primary Clinic: [address] [place] [address]  Primary Care Clinic Name: [facility]   Reason for Hospitalization:  Cellulitis [L03.90]  Admit Date/Time: 2019  2:37 PM  Discharge Date: 1/15/2018  Payor Source: Payor: HUMANA / Plan: HUMANA MEDICARE ADVANTAGE / Product Type: Medicare /     Readmission Assessment Measure ([place]RI[place]) Risk Score/category: AVERAGE   Reason for Communication Hand-off Referral: Other CTS following average ANDRIA pt with hx of DM A1C 11.7 , S/p OR on foot r/t DM. Assessing for gaps and needs     Discharge Plan: Return Home        Concern for non-adherence with plan of care:   Y/N Pt is not engaging.   Discharge Needs Assessment:  Needs      Most Recent Value   Equipment Currently Used at Home  cane, straight, shower chair, walker, rolling, wheelchair, manual [4WW and a knee scooter]                Key Recommendations: CTS following pt. Toe, right, second toe partial amputation-   -necrosis with abscess formation, Negative for diagnostic osteomyelitis within planes examined.   A1C 11.7, pt notes this to be 2/2 to his former insurance plan, did not fill supplies or meds -  Pt notes goal to \"get back on track with new insurance\"   Please follow pt closely in order to provide on going assessment and intervention in [facility]  in care of his chronic condtions, DM, S/p toe amp.  Promotion of healing.     Che Mcguire    AVS/Discharge Summary is the source of truth; this is a helpful guide for improved communication of patient story          "

## 2019-01-05 NOTE — ED TRIAGE NOTES
Patient sent by clinic for treatment of cellulitis in right foot and probable admission.      ABCs intact.  Alert and oriented x 3.

## 2019-01-05 NOTE — ED NOTES
"[facility]  ED Nurse Handoff Report    Jayro Elder is a 68 year old male   ED Chief complaint: Cellulitis  . ED Diagnosis:   Final diagnoses:   Cellulitis     Allergies:   Allergies   Allergen Reactions     No Known Allergies        Code Status: Full Code  Activity level - Baseline/Home:  Independent. Activity Level - Current:   Independent. Lift room needed: No. Bariatric: No   Needed: No   Isolation: No. Infection: Not Applicable.     Vital Signs:   Vitals:    01/05/19 1333   BP: 128/82   Pulse: 88   Resp: 20   Temp: 98.3  F (36.8  C)   TempSrc: Temporal   SpO2: 98%   Weight: 113.4 kg (250 lb)   Height: 1.93 m (6' 4\")       Cardiac Rhythm:  ,      Pain level: 0-10 Pain Scale: 0  Patient confused: No. Patient Falls Risk: Yes.   Elimination Status: Has voided   Patient Report - Initial Complaint:  Patient sent by clinic for treatment of cellulitis in right foot and probable admission.       ABCs intact.  Alert and oriented x 3.  . Focused Assessment: Skin - Skin Integrity: wound; excoriation Skin Comment: right second toe redness, swelling, wound. patient states it started today. he went to clinic, was told to come to ED for admission to hospital. patient has an area of excoriation that looks like it may have been a blister that broke and was torn off. redness to top of foot. another area of redness to right ankle and up the right lower leg. rates pain at 0/10.   Tests Performed: labs. Abnormal Results:   Abnormal Labs Reviewed   CBC WITH PLATELETS DIFFERENTIAL - Abnormal; Notable for the following components:       Result Value    Hemoglobin 13.2 (*)     Hematocrit 39.6 (*)     All other components within normal limits   COMPREHENSIVE METABOLIC PANEL - Abnormal; Notable for the following components:    Glucose 346 (*)     Bilirubin Total 1.6 (*)     Albumin 3.2 (*)     All other components within normal limits     XR Toe Right G/E 2 Views   Final Result   IMPRESSION: No fracture or " osseous lesion is seen. There is prominent   hallux valgus with moderate to advanced degenerative changes of the   first metatarsophalangeal joint. No other significant joint space   pathology is seen. There is prominent soft tissue swelling surrounding   the distal phalanx of the second toe. Small areas of decreased   attenuation distally on the frontal view may represent gas or a soft   tissue defect. Clinical correlation is recommended. No underlying   cortical erosive changes are seen to suggest osteomyelitis. No other   abnormality is seen.      TALHA OTTO MD      .   Treatments provided: zosyn, vanco ordered  Family Comments: wife at bedside  OBS brochure/video discussed/provided to patient:  No  ED Medications:   Medications   0.9% sodium chloride BOLUS (0 mLs Intravenous Stopped 1/5/19 1709)   piperacillin-tazobactam (ZOSYN) intermittent infusion 4.5 g (0 g Intravenous Stopped 1/5/19 1709)     Drips infusing:  No  For the majority of the shift, the patient's behavior Green. Interventions performed were none.     Severe Sepsis OR Septic Shock Diagnosis Present: No      ED Nurse Name/Phone Number: Shannan FABIOLA Doshi,   5:20 PM    RECEIVING UNIT ED HANDOFF REVIEW    Above ED Nurse Handoff Report was reviewed: Yes  Reviewed by: Elizabeth Dumont on January 5, 2019 at 5:36 PM

## 2019-01-06 ENCOUNTER — ANESTHESIA EVENT (OUTPATIENT)
Dept: SURGERY | Facility: CLINIC | Age: 69
DRG: 255 | End: 2019-01-06
Payer: COMMERCIAL

## 2019-01-06 ENCOUNTER — APPOINTMENT (OUTPATIENT)
Dept: MRI IMAGING | Facility: CLINIC | Age: 69
DRG: 255 | End: 2019-01-06
Payer: COMMERCIAL

## 2019-01-06 ENCOUNTER — APPOINTMENT (OUTPATIENT)
Dept: ULTRASOUND IMAGING | Facility: CLINIC | Age: 69
DRG: 255 | End: 2019-01-06
Payer: COMMERCIAL

## 2019-01-06 ENCOUNTER — ANESTHESIA (OUTPATIENT)
Dept: SURGERY | Facility: CLINIC | Age: 69
DRG: 255 | End: 2019-01-06
Payer: COMMERCIAL

## 2019-01-06 LAB
ANION GAP SERPL CALCULATED.3IONS-SCNC: 7 MMOL/L (ref 3–14)
BASOPHILS # BLD AUTO: 0 10E9/L (ref 0–0.2)
BASOPHILS NFR BLD AUTO: 0.4 %
BUN SERPL-MCNC: 13 MG/DL (ref 7–30)
CALCIUM SERPL-MCNC: 8.1 MG/DL (ref 8.5–10.1)
CHLORIDE SERPL-SCNC: 103 MMOL/L (ref 94–109)
CO2 SERPL-SCNC: 26 MMOL/L (ref 20–32)
CREAT SERPL-MCNC: 0.77 MG/DL (ref 0.66–1.25)
DIFFERENTIAL METHOD BLD: ABNORMAL
EOSINOPHIL # BLD AUTO: 0.1 10E9/L (ref 0–0.7)
EOSINOPHIL NFR BLD AUTO: 1.9 %
ERYTHROCYTE [DISTWIDTH] IN BLOOD BY AUTOMATED COUNT: 12.9 % (ref 10–15)
GFR SERPL CREATININE-BSD FRML MDRD: >90 ML/MIN/{1.73_M2}
GLUCOSE BLDC GLUCOMTR-MCNC: 120 MG/DL (ref 70–99)
GLUCOSE BLDC GLUCOMTR-MCNC: 133 MG/DL (ref 70–99)
GLUCOSE BLDC GLUCOMTR-MCNC: 189 MG/DL (ref 70–99)
GLUCOSE BLDC GLUCOMTR-MCNC: 199 MG/DL (ref 70–99)
GLUCOSE BLDC GLUCOMTR-MCNC: 220 MG/DL (ref 70–99)
GLUCOSE BLDC GLUCOMTR-MCNC: 301 MG/DL (ref 70–99)
GLUCOSE SERPL-MCNC: 184 MG/DL (ref 70–99)
GRAM STN SPEC: ABNORMAL
GRAM STN SPEC: ABNORMAL
HCT VFR BLD AUTO: 36 % (ref 40–53)
HGB BLD-MCNC: 11.9 G/DL (ref 13.3–17.7)
IMM GRANULOCYTES # BLD: 0 10E9/L (ref 0–0.4)
IMM GRANULOCYTES NFR BLD: 0.4 %
LYMPHOCYTES # BLD AUTO: 1.2 10E9/L (ref 0.8–5.3)
LYMPHOCYTES NFR BLD AUTO: 16.7 %
MCH RBC QN AUTO: 28.6 PG (ref 26.5–33)
MCHC RBC AUTO-ENTMCNC: 33.1 G/DL (ref 31.5–36.5)
MCV RBC AUTO: 87 FL (ref 78–100)
MONOCYTES # BLD AUTO: 0.9 10E9/L (ref 0–1.3)
MONOCYTES NFR BLD AUTO: 13 %
NEUTROPHILS # BLD AUTO: 4.6 10E9/L (ref 1.6–8.3)
NEUTROPHILS NFR BLD AUTO: 67.6 %
NRBC # BLD AUTO: 0 10*3/UL
NRBC BLD AUTO-RTO: 0 /100
PLATELET # BLD AUTO: 182 10E9/L (ref 150–450)
POTASSIUM SERPL-SCNC: 3.6 MMOL/L (ref 3.4–5.3)
RBC # BLD AUTO: 4.16 10E12/L (ref 4.4–5.9)
SODIUM SERPL-SCNC: 136 MMOL/L (ref 133–144)
SPECIMEN SOURCE: ABNORMAL
WBC # BLD AUTO: 6.9 10E9/L (ref 4–11)

## 2019-01-06 PROCEDURE — 80048 BASIC METABOLIC PNL TOTAL CA: CPT | Performed by: INTERNAL MEDICINE

## 2019-01-06 PROCEDURE — 88311 DECALCIFY TISSUE: CPT | Mod: 26 | Performed by: PODIATRIST

## 2019-01-06 PROCEDURE — 25000125 ZZHC RX 250: Performed by: NURSE ANESTHETIST, CERTIFIED REGISTERED

## 2019-01-06 PROCEDURE — 25000128 H RX IP 250 OP 636: Performed by: NURSE ANESTHETIST, CERTIFIED REGISTERED

## 2019-01-06 PROCEDURE — 27210794 ZZH OR GENERAL SUPPLY STERILE: Performed by: PODIATRIST

## 2019-01-06 PROCEDURE — 85025 COMPLETE CBC W/AUTO DIFF WBC: CPT | Performed by: INTERNAL MEDICINE

## 2019-01-06 PROCEDURE — 87077 CULTURE AEROBIC IDENTIFY: CPT | Performed by: PODIATRIST

## 2019-01-06 PROCEDURE — 00000146 ZZHCL STATISTIC GLUCOSE BY METER IP

## 2019-01-06 PROCEDURE — 87075 CULTR BACTERIA EXCEPT BLOOD: CPT | Performed by: PODIATRIST

## 2019-01-06 PROCEDURE — 37000009 ZZH ANESTHESIA TECHNICAL FEE, EACH ADDTL 15 MIN: Performed by: PODIATRIST

## 2019-01-06 PROCEDURE — 88311 DECALCIFY TISSUE: CPT | Performed by: PODIATRIST

## 2019-01-06 PROCEDURE — 73720 MRI LWR EXTREMITY W/O&W/DYE: CPT | Mod: RT

## 2019-01-06 PROCEDURE — 25000132 ZZH RX MED GY IP 250 OP 250 PS 637: Mod: GY | Performed by: INTERNAL MEDICINE

## 2019-01-06 PROCEDURE — 99232 SBSQ HOSP IP/OBS MODERATE 35: CPT | Performed by: INTERNAL MEDICINE

## 2019-01-06 PROCEDURE — 99207 ZZC CDG-MDM COMPONENT: MEETS LOW - DOWN CODED: CPT | Performed by: INTERNAL MEDICINE

## 2019-01-06 PROCEDURE — 36000050 ZZH SURGERY LEVEL 2 1ST 30 MIN: Performed by: PODIATRIST

## 2019-01-06 PROCEDURE — 87076 CULTURE ANAEROBE IDENT EACH: CPT | Performed by: PODIATRIST

## 2019-01-06 PROCEDURE — 88305 TISSUE EXAM BY PATHOLOGIST: CPT | Performed by: PODIATRIST

## 2019-01-06 PROCEDURE — 25000128 H RX IP 250 OP 636: Performed by: INTERNAL MEDICINE

## 2019-01-06 PROCEDURE — 25500064 ZZH RX 255 OP 636: Performed by: INTERNAL MEDICINE

## 2019-01-06 PROCEDURE — 87070 CULTURE OTHR SPECIMN AEROBIC: CPT | Performed by: PODIATRIST

## 2019-01-06 PROCEDURE — 93971 EXTREMITY STUDY: CPT | Mod: RT

## 2019-01-06 PROCEDURE — 28825 PARTIAL AMPUTATION OF TOE: CPT | Mod: T6 | Performed by: PODIATRIST

## 2019-01-06 PROCEDURE — A9585 GADOBUTROL INJECTION: HCPCS | Performed by: INTERNAL MEDICINE

## 2019-01-06 PROCEDURE — 12000000 ZZH R&B MED SURG/OB

## 2019-01-06 PROCEDURE — 87176 TISSUE HOMOGENIZATION CULTR: CPT | Performed by: PODIATRIST

## 2019-01-06 PROCEDURE — 40000306 ZZH STATISTIC PRE PROC ASSESS II: Performed by: PODIATRIST

## 2019-01-06 PROCEDURE — 0Y6R0Z2 DETACHMENT AT RIGHT 2ND TOE, MID, OPEN APPROACH: ICD-10-PCS | Performed by: PODIATRIST

## 2019-01-06 PROCEDURE — 71000013 ZZH RECOVERY PHASE 1 LEVEL 1 EA ADDTL HR: Performed by: PODIATRIST

## 2019-01-06 PROCEDURE — 87205 SMEAR GRAM STAIN: CPT | Performed by: PODIATRIST

## 2019-01-06 PROCEDURE — 36415 COLL VENOUS BLD VENIPUNCTURE: CPT | Performed by: INTERNAL MEDICINE

## 2019-01-06 PROCEDURE — 25000132 ZZH RX MED GY IP 250 OP 250 PS 637: Performed by: INTERNAL MEDICINE

## 2019-01-06 PROCEDURE — A9270 NON-COVERED ITEM OR SERVICE: HCPCS | Mod: GY | Performed by: INTERNAL MEDICINE

## 2019-01-06 PROCEDURE — 88305 TISSUE EXAM BY PATHOLOGIST: CPT | Mod: 26 | Performed by: PODIATRIST

## 2019-01-06 PROCEDURE — 99223 1ST HOSP IP/OBS HIGH 75: CPT | Mod: 57 | Performed by: PODIATRIST

## 2019-01-06 PROCEDURE — 36000052 ZZH SURGERY LEVEL 2 EA 15 ADDTL MIN: Performed by: PODIATRIST

## 2019-01-06 PROCEDURE — 37000008 ZZH ANESTHESIA TECHNICAL FEE, 1ST 30 MIN: Performed by: PODIATRIST

## 2019-01-06 PROCEDURE — 25000125 ZZHC RX 250: Performed by: PODIATRIST

## 2019-01-06 PROCEDURE — 71000012 ZZH RECOVERY PHASE 1 LEVEL 1 FIRST HR: Performed by: PODIATRIST

## 2019-01-06 PROCEDURE — 87186 SC STD MICRODIL/AGAR DIL: CPT | Performed by: PODIATRIST

## 2019-01-06 RX ORDER — GADOBUTROL 604.72 MG/ML
10 INJECTION INTRAVENOUS ONCE
Status: COMPLETED | OUTPATIENT
Start: 2019-01-06 | End: 2019-01-06

## 2019-01-06 RX ORDER — SODIUM CHLORIDE, SODIUM LACTATE, POTASSIUM CHLORIDE, CALCIUM CHLORIDE 600; 310; 30; 20 MG/100ML; MG/100ML; MG/100ML; MG/100ML
INJECTION, SOLUTION INTRAVENOUS CONTINUOUS
Status: DISCONTINUED | OUTPATIENT
Start: 2019-01-06 | End: 2019-01-06 | Stop reason: HOSPADM

## 2019-01-06 RX ORDER — FENTANYL CITRATE 50 UG/ML
25-50 INJECTION, SOLUTION INTRAMUSCULAR; INTRAVENOUS
Status: DISCONTINUED | OUTPATIENT
Start: 2019-01-06 | End: 2019-01-06 | Stop reason: HOSPADM

## 2019-01-06 RX ORDER — PROPOFOL 10 MG/ML
INJECTION, EMULSION INTRAVENOUS PRN
Status: DISCONTINUED | OUTPATIENT
Start: 2019-01-06 | End: 2019-01-06

## 2019-01-06 RX ORDER — ONDANSETRON 4 MG/1
4 TABLET, ORALLY DISINTEGRATING ORAL EVERY 30 MIN PRN
Status: DISCONTINUED | OUTPATIENT
Start: 2019-01-06 | End: 2019-01-06 | Stop reason: HOSPADM

## 2019-01-06 RX ORDER — HYDRALAZINE HYDROCHLORIDE 20 MG/ML
2.5-5 INJECTION INTRAMUSCULAR; INTRAVENOUS EVERY 10 MIN PRN
Status: DISCONTINUED | OUTPATIENT
Start: 2019-01-06 | End: 2019-01-06 | Stop reason: HOSPADM

## 2019-01-06 RX ORDER — LIDOCAINE 40 MG/G
CREAM TOPICAL
Status: DISCONTINUED | OUTPATIENT
Start: 2019-01-06 | End: 2019-01-06 | Stop reason: HOSPADM

## 2019-01-06 RX ORDER — SODIUM CHLORIDE, SODIUM LACTATE, POTASSIUM CHLORIDE, CALCIUM CHLORIDE 600; 310; 30; 20 MG/100ML; MG/100ML; MG/100ML; MG/100ML
INJECTION, SOLUTION INTRAVENOUS CONTINUOUS PRN
Status: DISCONTINUED | OUTPATIENT
Start: 2019-01-06 | End: 2019-01-06

## 2019-01-06 RX ORDER — ONDANSETRON 2 MG/ML
4 INJECTION INTRAMUSCULAR; INTRAVENOUS EVERY 30 MIN PRN
Status: DISCONTINUED | OUTPATIENT
Start: 2019-01-06 | End: 2019-01-06 | Stop reason: HOSPADM

## 2019-01-06 RX ORDER — LIDOCAINE HYDROCHLORIDE 10 MG/ML
INJECTION, SOLUTION INFILTRATION; PERINEURAL PRN
Status: DISCONTINUED | OUTPATIENT
Start: 2019-01-06 | End: 2019-01-06

## 2019-01-06 RX ORDER — MAGNESIUM HYDROXIDE 1200 MG/15ML
LIQUID ORAL PRN
Status: DISCONTINUED | OUTPATIENT
Start: 2019-01-06 | End: 2019-01-06 | Stop reason: HOSPADM

## 2019-01-06 RX ORDER — NALOXONE HYDROCHLORIDE 0.4 MG/ML
.1-.4 INJECTION, SOLUTION INTRAMUSCULAR; INTRAVENOUS; SUBCUTANEOUS
Status: DISCONTINUED | OUTPATIENT
Start: 2019-01-06 | End: 2019-01-06

## 2019-01-06 RX ORDER — FENTANYL CITRATE 50 UG/ML
INJECTION, SOLUTION INTRAMUSCULAR; INTRAVENOUS PRN
Status: DISCONTINUED | OUTPATIENT
Start: 2019-01-06 | End: 2019-01-06

## 2019-01-06 RX ORDER — CEFAZOLIN SODIUM 1 G/50ML
2000 SOLUTION INTRAVENOUS EVERY 12 HOURS
Status: DISCONTINUED | OUTPATIENT
Start: 2019-01-06 | End: 2019-01-09

## 2019-01-06 RX ORDER — ALBUTEROL SULFATE 0.83 MG/ML
2.5 SOLUTION RESPIRATORY (INHALATION) EVERY 4 HOURS PRN
Status: DISCONTINUED | OUTPATIENT
Start: 2019-01-06 | End: 2019-01-06 | Stop reason: HOSPADM

## 2019-01-06 RX ORDER — OXYCODONE HYDROCHLORIDE 5 MG/1
5-10 TABLET ORAL EVERY 4 HOURS PRN
Status: DISCONTINUED | OUTPATIENT
Start: 2019-01-06 | End: 2019-01-14 | Stop reason: HOSPADM

## 2019-01-06 RX ADMIN — SODIUM CHLORIDE, POTASSIUM CHLORIDE, SODIUM LACTATE AND CALCIUM CHLORIDE: 600; 310; 30; 20 INJECTION, SOLUTION INTRAVENOUS at 13:45

## 2019-01-06 RX ADMIN — GADOBUTROL 10 ML: 604.72 INJECTION INTRAVENOUS at 17:07

## 2019-01-06 RX ADMIN — TAZOBACTAM SODIUM AND PIPERACILLIN SODIUM 3.38 G: 375; 3 INJECTION, SOLUTION INTRAVENOUS at 22:43

## 2019-01-06 RX ADMIN — AMLODIPINE BESYLATE 5 MG: 5 TABLET ORAL at 08:07

## 2019-01-06 RX ADMIN — LIDOCAINE HYDROCHLORIDE 50 MG: 10 INJECTION, SOLUTION INFILTRATION; PERINEURAL at 14:07

## 2019-01-06 RX ADMIN — TAZOBACTAM SODIUM AND PIPERACILLIN SODIUM 3.38 G: 375; 3 INJECTION, SOLUTION INTRAVENOUS at 03:29

## 2019-01-06 RX ADMIN — FENTANYL CITRATE 100 MCG: 50 INJECTION, SOLUTION INTRAMUSCULAR; INTRAVENOUS at 14:07

## 2019-01-06 RX ADMIN — OXYCODONE HYDROCHLORIDE 5 MG: 5 TABLET ORAL at 08:07

## 2019-01-06 RX ADMIN — INSULIN ASPART 3 UNITS: 100 INJECTION, SOLUTION INTRAVENOUS; SUBCUTANEOUS at 08:08

## 2019-01-06 RX ADMIN — ISOSORBIDE MONONITRATE 30 MG: 30 TABLET, EXTENDED RELEASE ORAL at 08:07

## 2019-01-06 RX ADMIN — LEVOTHYROXINE SODIUM 137 MCG: 137 TABLET ORAL at 22:41

## 2019-01-06 RX ADMIN — OXYCODONE HYDROCHLORIDE 10 MG: 5 TABLET ORAL at 12:22

## 2019-01-06 RX ADMIN — PROPOFOL 180 MG: 10 INJECTION, EMULSION INTRAVENOUS at 14:07

## 2019-01-06 RX ADMIN — SIMVASTATIN 40 MG: 40 TABLET, FILM COATED ORAL at 22:41

## 2019-01-06 RX ADMIN — LISINOPRIL 20 MG: 20 TABLET ORAL at 20:29

## 2019-01-06 RX ADMIN — METOPROLOL SUCCINATE 50 MG: 50 TABLET, EXTENDED RELEASE ORAL at 22:41

## 2019-01-06 RX ADMIN — VANCOMYCIN HYDROCHLORIDE 2000 MG: 10 INJECTION, POWDER, LYOPHILIZED, FOR SOLUTION INTRAVENOUS at 20:33

## 2019-01-06 RX ADMIN — VITAMIN D, TAB 1000IU (100/BT) 2000 UNITS: 25 TAB at 20:29

## 2019-01-06 RX ADMIN — PANTOPRAZOLE SODIUM 40 MG: 40 TABLET, DELAYED RELEASE ORAL at 08:07

## 2019-01-06 RX ADMIN — LISINOPRIL 20 MG: 20 TABLET ORAL at 08:07

## 2019-01-06 RX ADMIN — OXYCODONE HYDROCHLORIDE 5 MG: 5 TABLET ORAL at 02:00

## 2019-01-06 RX ADMIN — TAZOBACTAM SODIUM AND PIPERACILLIN SODIUM 3.38 G: 375; 3 INJECTION, SOLUTION INTRAVENOUS at 09:57

## 2019-01-06 RX ADMIN — INSULIN ASPART 4 UNITS: 100 INJECTION, SOLUTION INTRAVENOUS; SUBCUTANEOUS at 18:10

## 2019-01-06 RX ADMIN — ACETAMINOPHEN 650 MG: 325 TABLET, FILM COATED ORAL at 10:42

## 2019-01-06 ASSESSMENT — ACTIVITIES OF DAILY LIVING (ADL)
COGNITION: 0 - NO COGNITION ISSUES REPORTED
ADLS_ACUITY_SCORE: 13
RETIRED_EATING: 0-->INDEPENDENT
TRANSFERRING: 0-->INDEPENDENT
ADLS_ACUITY_SCORE: 12
FALL_HISTORY_WITHIN_LAST_SIX_MONTHS: YES
DRESS: 0-->INDEPENDENT
AMBULATION: 0-->INDEPENDENT
ADLS_ACUITY_SCORE: 14
NUMBER_OF_TIMES_PATIENT_HAS_FALLEN_WITHIN_LAST_SIX_MONTHS: 1
RETIRED_COMMUNICATION: 0-->UNDERSTANDS/COMMUNICATES WITHOUT DIFFICULTY
ADLS_ACUITY_SCORE: 14
TOILETING: 0-->INDEPENDENT
SWALLOWING: 0-->SWALLOWS FOODS/LIQUIDS WITHOUT DIFFICULTY
ADLS_ACUITY_SCORE: 14
BATHING: 0-->INDEPENDENT

## 2019-01-06 NOTE — ANESTHESIA CARE TRANSFER NOTE
Patient: Jayro Elder    Procedure(s):  Right second toe  amputation    Diagnosis: gas gangrenous toe  Diagnosis Additional Information: No value filed.    Anesthesia Type:   General, LMA     Note:  Airway :LMA  Patient transferred to:PACU  Comments: Pt sv good tidal volumes, awake LMA removed prepare to transfer to PACU,  Report to PACU RN.  VSS transfer careHandoff Report: Identifed the Patient, Identified the Reponsible Provider, Reviewed the pertinent medical history, Discussed the surgical course, Reviewed Intra-OP anesthesia mangement and issues during anesthesia, Set expectations for post-procedure period and Allowed opportunity for questions and acknowledgement of understanding      Vitals: (Last set prior to Anesthesia Care Transfer)    CRNA VITALS  1/6/2019 1413 - 1/6/2019 1447      1/6/2019             Pulse:  70    SpO2:  99 %    Resp Rate (observed):  12                Electronically Signed By: LORRAINE Mckeon CRNA  January 6, 2019  2:47 PM

## 2019-01-06 NOTE — PLAN OF CARE
Minimal pain to rt foot.bilat LE neuropathy. Cellulitis rt foot- marked. Red & hot to touch with sweliling. Pt keeping elevated. Drsg  rt toes 2nd & 3rd CD&I. Dry calloused areas Rt 2nd toe & left 2nd toe & lt ball of foot. Jeremi diabeticdiet. elevated blood sugars covered per sliding scale insulin & schedlued insulin meds. Iv antibiotics as ordered, NPO after midnight until seen by podiatry. Up indep in room. Voiding without difficulty. Denies any further nausea.

## 2019-01-06 NOTE — H&P
[facility]  Hospitalist Admission Note  Name: Jayro Elder    MRN: [id number]  YOB: 1950    Age: 68 year old  Date of admission: 1/5/2019  Primary care provider: Davion Cordova            Assessment and Plan:   Jayro Elder is a 68 year old male with numerous and complicated medical history such as CAD with prior PCI on Plavix, history of cardiomyopathy, hypertension, atrial fibrillation on Eliquis, insulin requiring diabetes mellitus, peripheral neuropathy, GERD, prior CVA, hypothyroidism, prior GBS septicemia, diabetic foot infection with cellulitis and requiring third toe amputation on last hospitalization back in September 2018 who presented earlier in the emergency room with his wife due to increasing erythema, tenderness of the right lower extremity and earlier bleeding tendencies of the right second toe that started several hours prior to hospitalization    1.  Recurrent right lower extremity cellulitis  2.  Necrotic, wound, diabetic foot second toe right foot  3.  Uncontrolled insulin requiring diabetes mellitus  4.  Chronic atrial fibrillation on anticoagulation with Eliquis  5.  History of CAD with prior PCI on Plavix (chart reviewed and stated stent placement back in 2009)  6.  Hypertension  7.  Prior GBS septicemia  8.  History of CVA  9.  Hypothyroidism  10.  History of ischemic cardiomyopathy ejection fraction noted back in September 2018 at 35-40%    Admit as inpatient.  At risk for clinical deterioration.  Continue with intravenous antibiotics and remain on IV Zosyn.  No prior history of MRSA.  Receive a dose of vancomycin earlier.  We will continue with his long-acting diabetes regimen.  Hold usual dose in the morning as I put the patient n.p.o. after midnight until he gets to be seen by podiatry service in the setting of this necrotic wound on second toe right foot.  With this I am also holding his Eliquis morning dose and Plavix for now as I am not quite  certain if he will be requiring surgical intervention with that right second toe.  Resumption of his beta-blockers, amlodipine, lisinopril, statins, nitrates.  Current working diagnosis, plan of care and indication for hospitalization were discussed in detail with our patient.  He was given the opportunity to ask questions that I have answered the best of my ability.    Code status: Full code  Admit to inpatient  Prophylaxis: On Eliquis  Disposition: Hopeful for home discharge but likely will be requiring at least 2 inpatient hospitalization days.          Chief Complaint:   Increasing erythema, tenderness on right lower extremity with episodic bleeding of the right second toe       Source of Information:   Patient with good reliability  Discussion with ED physician  Review of E chart records         History of Present Illness:   Jayro Elder is a 68 year old male with numerous and complicated medical history such as CAD with prior PCI on Plavix, history of cardiomyopathy, hypertension, atrial fibrillation on Eliquis, insulin requiring diabetes mellitus, peripheral neuropathy, GERD, prior CVA, hypothyroidism, prior GBS septicemia, diabetic foot infection with cellulitis and requiring third toe amputation on last hospitalization back in September 2018 who presented earlier in the emergency room with his wife due to increasing erythema, tenderness of the right lower extremity and earlier bleeding tendencies of the right second toe that started several hours prior to hospitalization.  He also stated that he felt feverish and had some chills but denies any diarrhea, abdominal pain, chest discomfort, shortness of breath, coughing spells, mental status changes, fall event, head trauma, other bleeding tendencies, urinary symptomatology or back pain.  He was seen earlier at the orthopedics clinic due to the bleeding that was referred to the emergency room for further evaluation.  Upon presentation in the ER he remained  hemodynamically stable, no hypoxia and was found with cellulitis of the right lower extremity and concern with necrotic wound on the right second toe hence this referral to us for further management and care.  During the time examination he is related to medical floor and reportedly tolerated oral diet, remains very pleasant and conversant and denies any new complaints.  He received intravenous antibiotics with Zosyn and vancomycin at the emergency room.  No recurrence of any bleeding events.            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     Cardiomyopathy (H)      Cellulitis of left lower extremity 3/13/2018     Cerebral infarction (H)     eye sight decreased in peripheral of right side and blurry     Diabetic ulcer of left midfoot associated with type 2 diabetes mellitus, with fat layer exposed (H) 3/13/2018     Essential hypertension, benign 11/13/2002     Generalized osteoarthrosis, unspecified site 11/13/2002     Microalbuminuria 3/13/2018    X1     Myocardial infarction (H)      Neuropathy      Sepsis (H)      Sleep apnea     doesn't use it     Substance abuse (H)      Syncope, unspecified syncope type 3/13/2018     Thyroid disease     takes medicine     Tobacco use disorder 11/13/2002     Type 2 diabetes mellitus with diabetic peripheral angiopathy without gangrene, unspecified long term insulin use status 3/13/2018     Type II or unspecified type diabetes mellitus without mention of complication, not stated as uncontrolled 7/14/2005             Past Surgical History:     Past Surgical History:   Procedure Laterality Date     ANGIOGRAM  6/29/05    subtotal occ.mid LAD,SUSAN mid LAD     ANGIOGRAM  7/05    mild CAD,patent stent,no flow-limiting lesions,sev.LV dysf.LAD enlarged     ANGIOGRAM  2/09    Sev.single vessel disease,Mod LV  dysf.distal LAD 90%,70-75% mid lad just before prev stent,SUSAN to prox.mid LAD, endeavor to distal LAD     ANGIOGRAM  13    restenosis, stent LAD     BACK SURGERY      back surgery x3     C NONSPECIFIC PROCEDURE      Laminectomy x 3 - (1983 x 2 & )     C NONSPECIFIC PROCEDURE Bilateral 1998    Bunionectomy     C NONSPECIFIC PROCEDURE      Gingival surgery at age 9     HEART CATH LEFT HEART CATH  2017    SUSAN to OM3     INCISION AND DRAINAGE TOE, COMBINED Bilateral 2018    Procedure: COMBINED INCISION AND DRAINAGE TOE;  1) Irrigation and debridement ulcer left great toe  2) Amputation 3rd toe right foot at distal interphalangeal joint level;  Surgeon: Miki Harp MD;  Location: [facility] OR     ORTHOPEDIC SURGERY      bunion surgery both feet     ORTHOPEDIC SURGERY      left shoulder     SOFT TISSUE SURGERY      debridement of toe numerous time     VASCULAR SURGERY      7 stents in heart             Social History:     Social History     Tobacco Use     Smoking status: Former Smoker     Packs/day: 1.00     Years: 25.00     Pack years: 25.00     Types: Cigarettes     Last attempt to quit: 2005     Years since quittin.4     Smokeless tobacco: Never Used   Substance Use Topics     Alcohol use: Yes     Alcohol/week: 2.4 oz     Types: 4 Shots of liquor per week     Comment: almost every day             Family History:   Family history was fully reviewed and non-contributory in this case.         Allergies:     Allergies   Allergen Reactions     No Known Allergies              Medications:     Prior to Admission medications    Medication Sig Last Dose Taking? Auth Provider   amLODIPine (NORVASC) 5 MG tablet Take 1 tablet (5 mg) by mouth daily 2019 at a.m Yes Johnathan Mckeon MD   apixaban ANTICOAGULANT (ELIQUIS) 5 MG tablet Take by mouth 2 times daily 2019 at pm Yes Reported, Patient   Cholecalciferol (D3 ADULT PO) Take 2,000 Units by mouth every evening  2019 at pm Yes  Reported, Patient   clopidogrel (PLAVIX) 75 MG tablet Take 1 tablet (75 mg) by mouth daily 1/4/2019 at a.m Yes Johnathan Mckeon MD   insulin aspart (NOVOLOG FLEXPEN) 100 UNIT/ML pen Inject Subcutaneous 3 times daily (with meals) USes about 7-10 units with meal depending 1/4/2019 at Unknown time Yes Unknown, Entered By History   insulin glargine U-300 (TOUJEO) 300 UNIT/ML injection Inject 35 Units Subcutaneous every morning  1/4/2019 at a.m Yes Unknown, Entered By History   isosorbide mononitrate (IMDUR) 30 MG 24 hr tablet Take 1 tablet (30 mg) by mouth daily 1/4/2019 at a.m Yes Johnathan Mckeon MD   levothyroxine (SYNTHROID, LEVOTHROID) 137 MCG tablet Take 137 mcg by mouth At Bedtime  1/4/2019 at hs Yes Henrry Figueroa PA-C   lisinopril (PRINIVIL/ZESTRIL) 20 MG tablet Take 1 tablet (20 mg) by mouth 2 times daily 1/4/2019 at pm Yes Johnathan Mckeon MD   metoprolol succinate (TOPROL-XL) 100 MG 24 hr tablet Take 0.5 tablets (50 mg) by mouth At Bedtime 1/4/2019 at hs Yes Anthony Baker DO   nitroglycerin (NITROSTAT) 0.4 MG sublingual tablet Place 1 tablet (0.4 mg) under the tongue every 5 minutes as needed for chest pain  Yes Davion Cordova PA-C   pantoprazole (PROTONIX) 40 MG enteric coated tablet Take 1 tablet (40 mg) by mouth every morning (before breakfast) 1/4/2019 at a.m Yes Ana Frankel MD   simvastatin (ZOCOR) 40 MG tablet Take 1 tablet (40 mg) by mouth At Bedtime 1/4/2019 at hs Yes Johnathan Mckeon MD   insulin pen needle 31G X 6 MM Use  as directed.   Vinicio Cook MD   order for DME Equipment being ordered: Walker Wheels () and Walker ()  Treatment Diagnosis: Impaired gait, heel WB bilaterally.   Herb Zurita III, MD             Review of Systems:   A Comprehensive greater than 10 system review of systems was carried out.  Pertinent positives and negatives are noted above.  Otherwise negative for contributory information.           Physical Exam:   Blood  "pressure 143/77, pulse 80, temperature 97.2  F (36.2  C), temperature source Oral, resp. rate 18, height 1.93 m (6' 4\"), weight 113.4 kg (250 lb), SpO2 99 %.  Wt Readings from Last 1 Encounters:   01/05/19 113.4 kg (250 lb)     Exam:  GENERAL: No apparent distress. Awake, alert, and fully oriented.  HEENT: Normocephalic, atraumatic. Extraocular movements intact.  CARDIOVASCULAR: Regular rate and rhythm without murmurs or rubs. No JVD  PULMONARY: Clear to auscultation, no wheezes, crackles  ABDOMINAL: Soft, non-tender, non-distended. Bowel sounds normoactive. No hepatosplenomegaly.  EXTREMITIES: No cyanosis or clubbing.  Erythema, warm to touch, tender upon palpation right anterior leg, right dorsal foot area, necrotic appearing, wound, second toe right foot  NEUROLOGICAL: CN 2-12 grossly intact, awake and alert x3, spontaneous and coherent speech. no focal neurological deficits.  DERMATOLOGICAL: No rash, ulcer, ecchymoses, jaundice.  Psych: not agitation, not combative, pleasant mood           Data:   EKG:    No new EKG seen  Imaging:  Recent Results (from the past 48 hour(s))   XR Toe Right G/E 2 Views    Narrative    TWO VIEWS OF THE TOES OF THE RIGHT FOOT   1/5/2019 3:31 PM    HISTORY: 2nd toe swelling, bruising, and cellulitis.    COMPARISON: None.      Impression    IMPRESSION: No fracture or osseous lesion is seen. There is prominent  hallux valgus with moderate to advanced degenerative changes of the  first metatarsophalangeal joint. No other significant joint space  pathology is seen. There is prominent soft tissue swelling surrounding  the distal phalanx of the second toe. Small areas of decreased  attenuation distally on the frontal view may represent gas or a soft  tissue defect. Clinical correlation is recommended. No underlying  cortical erosive changes are seen to suggest osteomyelitis. No other  abnormality is seen.    TALHA OTTO MD       Labs:  Recent Labs   Lab 01/05/19  1558   CULT PENDING "     Recent Labs   Lab 01/05/19  1559      POTASSIUM 3.7   CHLORIDE 99   CO2 25   ANIONGAP 10   *   BUN 16   CR 0.79   GFRESTIMATED >90   GFRESTBLACK >90   BETTIE 8.6     Recent Labs   Lab 01/05/19  1559   WBC 7.1   HGB 13.2*   HCT 39.6*   MCV 87        No results for input(s): SED, CRP in the last 168 hours.  Recent Labs   Lab 01/05/19  1841 01/05/19  1559   GLC  --  346*   *  --      No results for input(s): INR in the last 168 hours.  No results for input(s): TROPONIN, TROPI, TROPR in the last 168 hours.    Invalid input(s): TROP, TROPONINIES  No results for input(s): COLOR, APPEARANCE, URINEGLC, URINEBILI, URINEKETONE, SG, UBLD, URINEPH, PROTEIN, UROBILINOGEN, NITRITE, LEUKEST, RBCU, WBCU in the last 168 hours.

## 2019-01-06 NOTE — PHARMACY-ADMISSION MEDICATION HISTORY
Admission medication history interview status for this patient is complete. See [facility] admission navigator for allergy information, prior to admission medications and immunization status.     Medication history interview source(s):Patient  Medication history resources (including written lists, pill bottles, clinic record):Epi clist  Primary pharmacy:[facility]    Changes made to PTA medication list:  Added: Novolog tid per patient info  Deleted: Silvadene cream  Changed: As above    Actions taken by pharmacist (provider contacted, etc):None     Additional medication history information:None    Medication reconciliation/reorder completed by provider prior to medication history? Yes    For patients on insulin therapy: YES   Lantus: 35 units qam  Sliding scale Novolog Yes  If Yes, do you have a baseline novolog pre-meal dose: _7-10 units with meals   Patients eat three meals a day: Y/N   Any Barriers to therapy: cost of medications/comfortable with giving injections (if applicable)/ comfortable and confident with current diabetes regimen     Prior to Admission medications    Medication Sig Last Dose Taking? Auth Provider   amLODIPine (NORVASC) 5 MG tablet Take 1 tablet (5 mg) by mouth daily 1/4/2019 at a.m Yes Johnathan Mckeon MD   apixaban ANTICOAGULANT (ELIQUIS) 5 MG tablet Take by mouth 2 times daily 1/4/2019 at pm Yes Reported, Patient   Cholecalciferol (D3 ADULT PO) Take 2,000 Units by mouth every evening  1/4/2019 at pm Yes Reported, Patient   clopidogrel (PLAVIX) 75 MG tablet Take 1 tablet (75 mg) by mouth daily 1/4/2019 at a.m Yes Johnathan Mckeon MD   insulin aspart (NOVOLOG FLEXPEN) 100 UNIT/ML pen Inject Subcutaneous 3 times daily (with meals) USes about 7-10 units with meal depending 1/4/2019 at Unknown time Yes Unknown, Entered By History   insulin glargine U-300 (TOUJEO) 300 UNIT/ML injection Inject 35 Units Subcutaneous every morning  1/4/2019 at a.m Yes Unknown, Entered By History   isosorbide mononitrate  (IMDUR) 30 MG 24 hr tablet Take 1 tablet (30 mg) by mouth daily 1/4/2019 at a.m Yes Johnathan Mckeon MD   levothyroxine (SYNTHROID, LEVOTHROID) 137 MCG tablet Take 137 mcg by mouth At Bedtime  1/4/2019 at hs Yes Henrry Figueroa PA-C   lisinopril (PRINIVIL/ZESTRIL) 20 MG tablet Take 1 tablet (20 mg) by mouth 2 times daily 1/4/2019 at pm Yes Johnathan Mckeon MD   metoprolol succinate (TOPROL-XL) 100 MG 24 hr tablet Take 0.5 tablets (50 mg) by mouth At Bedtime 1/4/2019 at hs Yes Anthony Baker DO   nitroglycerin (NITROSTAT) 0.4 MG sublingual tablet Place 1 tablet (0.4 mg) under the tongue every 5 minutes as needed for chest pain  Yes Davion Cordova PA-C   pantoprazole (PROTONIX) 40 MG enteric coated tablet Take 1 tablet (40 mg) by mouth every morning (before breakfast) 1/4/2019 at a.m Yes Ana Frankel MD   simvastatin (ZOCOR) 40 MG tablet Take 1 tablet (40 mg) by mouth At Bedtime 1/4/2019 at hs Yes Johnathan Mckeon MD   insulin pen needle 31G X 6 MM Use  as directed.   Vinicio Cook MD   order for DME Equipment being ordered: Walker Wheels () and Walker ()  Treatment Diagnosis: Impaired gait, heel WB bilaterally.   Herb Zurita III, MD

## 2019-01-06 NOTE — PROGRESS NOTES
[facility]  Hospitalist Progress Note  Sabino Barbour MD 01/06/19    Reason for Stay (Diagnosis): diabetic foot infection         Assessment and Plan:      Summary of Stay: Jayro Elder is a 68 year old male with past medical history including insulin-dependent diabetes, known issues with prior diabetic foot infections, coronary artery disease with stents, cardiomyopathy, A. fib on Eliquis, untreated sleep apnea and stroke among others admitted on 1/5/2019 with necrotic appearing right second toe with surrounding foot and leg erythema.  He was admitted for broad-spectrum IV antibiotics and has been seen by podiatry with plans to take him to the operating room for likely surgical debridement versus amputation on 1/6.    Problem List/Assessment and Plan:   1. Gas-forming infection of right second toe: Remains on broad-spectrum antibiotics, to the OR today for likely amputation.  Concern for possible evolving sepsis as the redness had spread to his right lower leg as well.  --Continue vancomycin and Zosyn  --Operating room today  --We will need to await culture results, potential need for repeat surgery.    2.   Uncontrolled insulin-dependent diabetes:   --Currently on 35 units of U300 insulin twice daily as well as sliding scale insulin.  Control improved after admission.  Blood sugars 400s in the ER.  --Currently holding his scheduled mealtime insulin given that he has been n.p.o.  Will need to readdress likely tomorrow.  --Titrate regimen as needed.    3.   Atrial fibrillation on Eliquis: Had held Eliquis upon admission, will restart pending surgical plan.  It seems likely that he may need to return to the operating room so we will hold off today and reevaluate starting tomorrow.    4.   History of ischemic cardiomyopathy, coronary artery disease on Plavix.:  Stent reportedly was placed back in 2009.  -- hold Plavix for now pending surgical plan.  --Okay to resume lisinopril 20 mg twice  "daily, Toprol-XL at bedtime and amlodipine 5 mg daily  --EF 35-40% in September 2018.  Monitor for fluid overload.    5.   History of CVA      DVT Prophylaxis: Hold Eliquis pending surgical plan  Code Status: Full Code  Discharge Dispo/Date: At least 3 or 4 more days likely        Interval History (Subjective):      Patient admitted last night, I assume care today  He has significant right leg pain today, redness seem to be spreading from his foot up his right leg a bit  Discussed with podiatry, surgery in this case seems to be urgent for source control  Patient denied any cardiopulmonary complaints, he is generally frustrated with his current state.                  Physical Exam:      Last Vital Signs:  BP (!) 140/94   Pulse 76   Temp 97.8  F (36.6  C) (Temporal)   Resp 15   Ht 1.93 m (6' 4\")   Wt 113.4 kg (250 lb)   SpO2 97%   BMI 30.43 kg/m      I/O last 3 completed shifts:  In: 240 [P.O.:240]  Out: -     General: Alert, awake, no acute distress.  HEENT: NC/AT, eyes anicteric, external occular movements intact, face symmetric.  Dentition WNL, MM moist.  Cardiac: RRR, S1, S2.  No murmurs appreciated.  Pulmonary: Normal chest rise, normal work of breathing.  Lungs CTA BL  Abdomen: soft, non-tender, non-distended.  Bowel Sounds Present.  No guarding.  Extremities: Right leg diffusely tender to palpation, distally has erythema covering some of the right shin and most of the right foot.  Right second toe appears necrotic with some discharge and obvious erythema and swelling.  He has an ulcer on the plantar aspect of his left foot as well but no surrounding erythema.  No deformities.  Warm, well perfused.  Skin: no rashes or lesions noted.  Warm and Dry.  Neuro: No focal deficits noted.  Speech clear.  Coordination and strength grossly normal.  Psych: Appropriate affect.         Medications:      All current medications were reviewed with changes reflected in problem list.         Data:      All new lab and " imaging data was reviewed.   Labs:  Recent Labs   Lab 01/06/19  0652      POTASSIUM 3.6   CHLORIDE 103   CO2 26   ANIONGAP 7   *   BUN 13   CR 0.77   GFRESTIMATED >90   GFRESTBLACK >90   BETTIE 8.1*     Recent Labs   Lab 01/06/19  0652   WBC 6.9   HGB 11.9*   HCT 36.0*   MCV 87         Imaging:   Recent Results (from the past 48 hour(s))   XR Toe Right G/E 2 Views    Narrative    TWO VIEWS OF THE TOES OF THE RIGHT FOOT   1/5/2019 3:31 PM    HISTORY: 2nd toe swelling, bruising, and cellulitis.    COMPARISON: None.      Impression    IMPRESSION: No fracture or osseous lesion is seen. There is prominent  hallux valgus with moderate to advanced degenerative changes of the  first metatarsophalangeal joint. No other significant joint space  pathology is seen. There is prominent soft tissue swelling surrounding  the distal phalanx of the second toe. Small areas of decreased  attenuation distally on the frontal view may represent gas or a soft  tissue defect. Clinical correlation is recommended. No underlying  cortical erosive changes are seen to suggest osteomyelitis. No other  abnormality is seen.    TALHA OTTO MD   US Lower Extremity Venous Duplex Right    Narrative    VENOUS ULTRASOUND RIGHT LOWER EXTREMITY  1/6/2019 11:35 AM     HISTORY: Right leg pain, warmth. Does have right foot infection,  though consider deep venous thrombosis.    COMPARISON: None.    FINDINGS:  Examination of the deep veins with graded compression and  color flow Doppler with spectral wave form analysis shows no evidence  of thrombus in the common femoral vein, femoral vein, popliteal vein  or calf veins.  There is no venous insufficiency.      Impression    IMPRESSION: No evidence of deep venous thrombosis.    MD Sabino PEREZ MD.

## 2019-01-06 NOTE — ANESTHESIA PREPROCEDURE EVALUATION
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     Cardiomyopathy (H)      Cellulitis of left lower extremity 3/13/2018     Cerebral infarction (H)     eye sight decreased in peripheral of right side and blurry     Diabetic ulcer of left midfoot associated with type 2 diabetes mellitus, with fat layer exposed (H) 3/13/2018     Essential hypertension, benign 2002     Generalized osteoarthrosis, unspecified site 2002     Microalbuminuria 3/13/2018    X1     Myocardial infarction (H)      Neuropathy      Sepsis (H)      Sleep apnea     doesn't use it     Substance abuse (H)      Syncope, unspecified syncope type 3/13/2018     Thyroid disease     takes medicine     Tobacco use disorder 2002     Type 2 diabetes mellitus with diabetic peripheral angiopathy without gangrene, unspecified long term insulin use status 3/13/2018     Type II or unspecified type diabetes mellitus without mention of complication, not stated as uncontrolled 2005     Past Surgical History:   Procedure Laterality Date     ANGIOGRAM  05    subtotal occ.mid LAD,SUSAN mid LAD     ANGIOGRAM      mild CAD,patent stent,no flow-limiting lesions,sev.LV dysf.LAD enlarged     ANGIOGRAM      Sev.single vessel disease,Mod LV dysf.distal LAD 90%,70-75% mid lad just before prev stent,SUSAN to prox.mid LAD, endeavor to distal LAD     ANGIOGRAM  13    restenosis, stent LAD     BACK SURGERY      back surgery x3     C NONSPECIFIC PROCEDURE      Laminectomy x 3 - (1983 x 2 & )     C NONSPECIFIC PROCEDURE Bilateral 1998    Bunionectomy     C NONSPECIFIC PROCEDURE      Gingival surgery at age 9      HEART CATH LEFT HEART CATH  06/13/2017    SUSAN to OM3     INCISION AND DRAINAGE TOE, COMBINED Bilateral 9/11/2018    Procedure: COMBINED INCISION AND DRAINAGE TOE;  1) Irrigation and debridement ulcer left great toe  2) Amputation 3rd toe right foot at distal interphalangeal joint level;  Surgeon: Miki Harp MD;  Location: [facility] OR     ORTHOPEDIC SURGERY      bunion surgery both feet     ORTHOPEDIC SURGERY      left shoulder     SOFT TISSUE SURGERY      debridement of toe numerous time     VASCULAR SURGERY      7 stents in heart     Anesthesia Evaluation     . Pt has had prior anesthetic.     No history of anesthetic complications          ROS/MED HX    ENT/Pulmonary:     (+)sleep apnea, doesn't use CPAP , . .    Neurologic:     (+)CVA with deficits- perpheral vision,     Cardiovascular:     (+) hypertension--CAD, --. : . . . :. .       METS/Exercise Tolerance:     Hematologic:  - neg hematologic  ROS       Musculoskeletal:  - neg musculoskeletal ROS       GI/Hepatic:  - neg GI/hepatic ROS       Renal/Genitourinary:  - ROS Renal section negative       Endo:  - neg endo ROS   (+) type II DM thyroid problem hypothyroidism, .      Psychiatric:  - neg psychiatric ROS       Infectious Disease:         Malignancy:      - no malignancy   Other:    - neg other ROS                      Physical Exam  Normal systems: cardiovascular and pulmonary    Airway   Mallampati: II  TM distance: >3 FB  Neck ROM: full    Dental   (+) missing    Cardiovascular       Pulmonary             Lab Results   Component Value Date    WBC 6.9 01/06/2019    HGB 11.9 (L) 01/06/2019    HCT 36.0 (L) 01/06/2019     01/06/2019    CRP 81.6 (H) 09/08/2018    SED 15 09/08/2018     01/06/2019    POTASSIUM 3.6 01/06/2019    CHLORIDE 103 01/06/2019    CO2 26 01/06/2019    BUN 13 01/06/2019    CR 0.77 01/06/2019     (H) 01/06/2019    BETTIE 8.1 (L) 01/06/2019    ALBUMIN 3.2 (L) 01/05/2019    PROTTOTAL 6.8 01/05/2019    ALT 29  "01/05/2019    AST 16 01/05/2019    ALKPHOS 82 01/05/2019    BILITOTAL 1.6 (H) 01/05/2019    LIPASE 23 11/02/2010    PTT 31 06/09/2017    INR 1.36 (H) 09/06/2018    TSH 0.40 09/06/2018       Preop Vitals  BP Readings from Last 3 Encounters:   01/06/19 (!) 140/94   09/18/18 135/87   09/17/18 (!) 167/97    Pulse Readings from Last 3 Encounters:   01/06/19 76   09/18/18 87   09/16/18 77      Resp Readings from Last 3 Encounters:   01/06/19 15   09/18/18 16   09/17/18 16    SpO2 Readings from Last 3 Encounters:   01/06/19 97%   09/18/18 96%   09/17/18 96%      Temp Readings from Last 1 Encounters:   01/06/19 97.8  F (36.6  C) (Temporal)    Ht Readings from Last 1 Encounters:   01/05/19 1.93 m (6' 4\")      Wt Readings from Last 1 Encounters:   01/05/19 113.4 kg (250 lb)    Estimated body mass index is 30.43 kg/m  as calculated from the following:    Height as of this encounter: 1.93 m (6' 4\").    Weight as of this encounter: 113.4 kg (250 lb).       Anesthesia Plan      History & Physical Review  History and physical reviewed and following examination; no interval change.    ASA Status:  3 .    NPO Status:  > 8 hours    Plan for General and LMA with Intravenous and Propofol induction. Maintenance will be Balanced.    PONV prophylaxis:  Ondansetron (or other 5HT-3)       Postoperative Care  Postoperative pain management:  IV analgesics and Oral pain medications.      Consents  Anesthetic plan, risks, benefits and alternatives discussed with:  Patient or representative and Patient..                 Johnathan Wood MD                    .  "

## 2019-01-06 NOTE — PLAN OF CARE
Pt A&O x4. VS stable; afebrile. PO oxycodone and tylenol managing pain. CMS: baseline neuropathy in BLE's. RLE cellulitis remains within marked boarders-elevated. Dressing to second R toe changed-CDI. Continues on IV Zosyn. Up independently in room. Voiding in good amts. Pt NPO since midnight. Ultrasound done.    Pt brought down to PACU for surgery @ 1230.

## 2019-01-06 NOTE — BRIEF OP NOTE
[facility] Brief Operative Note    Pre-operative diagnosis: R 2nd toe gas gangrene   Post-operative diagnosis Same   Procedure: Procedure(s):  Open R 2nd toe partial amputation   Surgeon(s): Surgeon(s) and Role:     * Sabino Garcia DPM - Primary   Estimated blood loss: 5 mL   Specimens: ID Type Source Tests Collected by Time Destination   1 : right second toe- bone cultures Bone Toe ANAEROBIC BACTERIAL CULTURE, BONE CULTURE AEROBIC BACTERIAL Sabino Garcia DPM 1/6/2019  2:24 PM    2 : right second toe tissue cultures Tissue Toe ANAEROBIC BACTERIAL CULTURE, GRAM STAIN, TISSUE CULTURE AEROBIC BACTERIAL Sabino Garcia DPM 1/6/2019  2:25 PM    A : right second toe  evaluate for osteomylitis Tissue Toe SURGICAL PATHOLOGY EXAM Sabino Garcia DPM 1/6/2019  2:26 PM       Findings: See op note

## 2019-01-06 NOTE — PLAN OF CARE
A/O, increased pain Right leg.  Oxycodone given  RLE elevated.  Cellulitis red warm to touch within outline.  Pt complains of pain in Right leg, Thigh.  No redness noted on thigh.  NPO for podiatry consult.  Calluses on bilateral feet dressing to right foot CDI

## 2019-01-06 NOTE — ANESTHESIA POSTPROCEDURE EVALUATION
Patient: Jayro W Boldenow    Procedure(s):  Right second toe  amputation    Diagnosis:gas gangrenous toe  Diagnosis Additional Information: R 2nd toe gas gangrene    Anesthesia Type:  General, LMA    Note:  Anesthesia Post Evaluation    Patient location during evaluation: PACU  Patient participation: Able to fully participate in evaluation  Level of consciousness: awake and alert  Pain management: adequate  Airway patency: patent  Cardiovascular status: acceptable  Respiratory status: acceptable  Hydration status: acceptable  PONV: none     Anesthetic complications: None          Last vitals:  Vitals:    01/06/19 1450 01/06/19 1455 01/06/19 1500   BP: 132/83 124/87    Pulse: 82 79    Resp: 11 (!) 6 (!) 7   Temp:      SpO2: 99% 97% 99%         Electronically Signed By: Johnathan Wood MD  January 6, 2019  3:02 PM

## 2019-01-06 NOTE — CONSULTS
[facility]SULTATION  January 6, 2019      ASSESSMENT:   R 2nd toe ulcer, cellulitis, gas gangrene  DM II with neuropathy     PLAN:  Reviewed patient's chart in epic.  Discussed condition and treatment options including pros and cons.    -Due to severe gas forming infection of R 2nd toe, advised surgical intervention asap.  Surgery would involve R 2nd toe amputation.  Likely will need to be left open with return to OR in 2-3 days.  -Discussed extent of infection likely involves both bone in addition to soft tissue.  -Potential risks associated with surgery include but are not limited to infection, continued open wound at the surgical site, recurrent ulceration at the same or different location, bleeding, repeat surgery or amputation and blood clot.  -Pt agreed to surgery today.  -Discussed with hospitalist.     Sabino Garcia DPM, FACFAS  Pager: [phone]      PATIENT HISTORY:  Jayro Elder is a 68 year old male who was admitted with R 2nd toe infection.  Reports yesterday the toe increased in redness, swelling, drainage.  Hx of DM, foot wound.  Redness has extended up his lower leg.  No treatments per pt.  Sent here by [facility].      Review of Systems:  Patient denies f/c.  Rest of 10 pt ROS neg except for HPI.     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     Cardiomyopathy (H)      Cellulitis of left lower extremity 3/13/2018     Cerebral infarction (H)     eye sight decreased in peripheral of right side and blurry     Diabetic ulcer of left midfoot associated with type 2 diabetes mellitus, with fat layer exposed (H) 3/13/2018     Essential hypertension, benign 11/13/2002     Generalized osteoarthrosis, unspecified site 11/13/2002     Microalbuminuria 3/13/2018    X1     Myocardial infarction (H)       Neuropathy      Sepsis (H)      Sleep apnea     doesn't use it     Substance abuse (H)      Syncope, unspecified syncope type 3/13/2018     Thyroid disease     takes medicine     Tobacco use disorder 11/13/2002     Type 2 diabetes mellitus with diabetic peripheral angiopathy without gangrene, unspecified long term insulin use status 3/13/2018     Type II or unspecified type diabetes mellitus without mention of complication, not stated as uncontrolled 7/14/2005        PAST SURGICAL HISTORY:   Past Surgical History:   Procedure Laterality Date     ANGIOGRAM  6/29/05    subtotal occ.mid LAD,SUSAN mid LAD     ANGIOGRAM  7/05    mild CAD,patent stent,no flow-limiting lesions,sev.LV dysf.LAD enlarged     ANGIOGRAM  2/09    Sev.single vessel disease,Mod LV dysf.distal LAD 90%,70-75% mid lad just before prev stent,SUSAN to prox.mid LAD, endeavor to distal LAD     ANGIOGRAM  11/13/13    restenosis, stent LAD     BACK SURGERY      back surgery x3     C NONSPECIFIC PROCEDURE      Laminectomy x 3 - (1983 x 2 & 1990)     C NONSPECIFIC PROCEDURE Bilateral 1998    Bunionectomy     C NONSPECIFIC PROCEDURE  1959    Gingival surgery at age 9     HEART CATH LEFT HEART CATH  06/13/2017    SUSAN to OM3     INCISION AND DRAINAGE TOE, COMBINED Bilateral 9/11/2018    Procedure: COMBINED INCISION AND DRAINAGE TOE;  1) Irrigation and debridement ulcer left great toe  2) Amputation 3rd toe right foot at distal interphalangeal joint level;  Surgeon: Miki Harp MD;  Location: [facility] OR     ORTHOPEDIC SURGERY      bunion surgery both feet     ORTHOPEDIC SURGERY      left shoulder     SOFT TISSUE SURGERY      debridement of toe numerous time     VASCULAR SURGERY      7 stents in heart        MEDICATIONS:   Current Facility-Administered Medications:      acetaminophen (TYLENOL) tablet 650 mg, 650 mg, Oral, Q4H PRN, Wale Dalton MD, 650 mg at 01/06/19 1042     amLODIPine (NORVASC) tablet 5 mg, 5 mg, Oral, Daily, Wale Dalton,  MD, 5 mg at 01/06/19 0807     glucose gel 15-30 g, 15-30 g, Oral, Q15 Min PRN **OR** dextrose 50 % injection 25-50 mL, 25-50 mL, Intravenous, Q15 Min PRN **OR** glucagon injection 1 mg, 1 mg, Subcutaneous, Q15 Min PRN, Wale Dalton MD     insulin aspart (NovoLOG) inj (RAPID ACTING), 1-10 Units, Subcutaneous, TID AC, Wale Dalton MD, 3 Units at 01/06/19 0808     insulin aspart (NovoLOG) inj (RAPID ACTING), 1-7 Units, Subcutaneous, At Bedtime, Wale Dalton MD, 6 Units at 01/05/19 2139     isosorbide mononitrate (IMDUR) 24 hr tablet 30 mg, 30 mg, Oral, Daily, Wale Dalton MD, 30 mg at 01/06/19 0807     levothyroxine (SYNTHROID/LEVOTHROID) tablet 137 mcg, 137 mcg, Oral, At Bedtime, Wale Dalton MD, 137 mcg at 01/05/19 2138     lisinopril (PRINIVIL/ZESTRIL) tablet 20 mg, 20 mg, Oral, BID, Wale Dalton MD, 20 mg at 01/06/19 0807     melatonin tablet 1 mg, 1 mg, Oral, At Bedtime PRN, Wale Dalton MD     metoprolol succinate ER (TOPROL-XL) 24 hr tablet 50 mg, 50 mg, Oral, At Bedtime, Wale Dalton MD, 50 mg at 01/05/19 2138     naloxone (NARCAN) injection 0.1-0.4 mg, 0.1-0.4 mg, Intravenous, Q2 Min PRN, Wale Dalton MD     nitroGLYcerin (NITROSTAT) sublingual tablet 0.4 mg, 0.4 mg, Sublingual, Q5 Min PRN, Wale Dalton MD     ondansetron (ZOFRAN-ODT) ODT tab 4 mg, 4 mg, Oral, Q6H PRN **OR** ondansetron (ZOFRAN) injection 4 mg, 4 mg, Intravenous, Q6H PRN, Wale Dalton MD     oxyCODONE (ROXICODONE) tablet 5-10 mg, 5-10 mg, Oral, Q4H PRN, Sabino Barbour MD     pantoprazole (PROTONIX) EC tablet 40 mg, 40 mg, Oral, QAM AC, Wale Dalton MD, 40 mg at 01/06/19 0807     Patient is already receiving anticoagulation with heparin, enoxaparin (LOVENOX), warfarin (COUMADIN)  or other anticoagulant medication, , Does not apply, Continuous PRN, Wale Dalton MD     piperacillin-tazobactam (ZOSYN) infusion 3.375  g, 3.375 g, Intravenous, Q6H, Wale Dalton MD, Last Rate: 100 mL/hr at 01/06/19 0957, 3.375 g at 01/06/19 0957     potassium chloride (KLOR-CON) Packet 20-40 mEq, 20-40 mEq, Oral or Feeding Tube, Q2H PRN, Wale Dalton MD     potassium chloride 10 mEq in 100 mL intermittent infusion with 10 mg lidocaine, 10 mEq, Intravenous, Q1H PRN, Wale Dalton MD     potassium chloride 10 mEq in 100 mL sterile water intermittent infusion (premix), 10 mEq, Intravenous, Q1H PRN, Wale Dalton MD     potassium chloride 20 mEq in 50 mL intermittent infusion, 20 mEq, Intravenous, Q1H PRN, Wale Dalton MD     potassium chloride ER (K-DUR/KLOR-CON M) CR tablet 20-40 mEq, 20-40 mEq, Oral, Q2H PRN, Wale Dalton MD     senna-docusate (SENOKOT-S/PERICOLACE) 8.6-50 MG per tablet 1 tablet, 1 tablet, Oral, BID PRN **OR** senna-docusate (SENOKOT-S/PERICOLACE) 8.6-50 MG per tablet 2 tablet, 2 tablet, Oral, BID PRN, Wale Dalton MD     simvastatin (ZOCOR) tablet 40 mg, 40 mg, Oral, At Bedtime, Wale Dalton MD, 40 mg at 01/05/19 2138     vitamin D3 (CHOLECALCIFEROL) 1000 units (25 mcg) tablet 2,000 Units, 2,000 Units, Oral, QPM, Wale Dalton MD, 2,000 Units at 01/05/19 2138     ALLERGIES:    Allergies   Allergen Reactions     No Known Allergies         SOCIAL HISTORY:   Social History     Socioeconomic History     Marital status:      Spouse name: Not on file     Number of children: Not on file     Years of education: Not on file     Highest education level: Not on file   Social Needs     Financial resource strain: Not on file     Food insecurity - worry: Not on file     Food insecurity - inability: Not on file     Transportation needs - medical: Not on file     Transportation needs - non-medical: Not on file   Occupational History     Not on file   Tobacco Use     Smoking status: Former Smoker     Packs/day: 1.00     Years: 25.00     Pack years: 25.00     Types:  "Cigarettes     Last attempt to quit: 2005     Years since quittin.4     Smokeless tobacco: Never Used   Substance and Sexual Activity     Alcohol use: Yes     Alcohol/week: 2.4 oz     Types: 4 Shots of liquor per week     Comment: almost every day     Drug use: No     Sexual activity: Yes     Partners: Female   Other Topics Concern     Parent/sibling w/ CABG, MI or angioplasty before 65F 55M? Yes      Service Not Asked     Blood Transfusions Not Asked     Caffeine Concern No     Comment: 3 cups daily     Occupational Exposure Not Asked     Hobby Hazards Not Asked     Sleep Concern Not Asked     Stress Concern Not Asked     Weight Concern Not Asked     Special Diet Yes     Comment: watching carb intake     Back Care Not Asked     Exercise No     Comment: some walking     Bike Helmet Not Asked     Seat Belt Not Asked     Self-Exams Not Asked   Social History Narrative     Not on file        FAMILY HISTORY:   Family History   Problem Relation Age of Onset     Cancer - colorectal Sister      Thyroid Disease Brother      Cardiovascular Brother      Diabetes Brother      Cancer Father         tumor in chest and throat     Arthritis Mother      Thyroid Disease Mother      Diabetes Mother      Glaucoma No family hx of      Macular Degeneration No family hx of         EXAM:Vitals: /76   Pulse 76   Temp 98.7  F (37.1  C) (Oral)   Resp 16   Ht 1.93 m (6' 4\")   Wt 113.4 kg (250 lb)   SpO2 95%   BMI 30.43 kg/m    BMI= Body mass index is 30.43 kg/m .    General appearance: Patient is alert and fully cooperative with history & exam.  No sign of distress is noted during the visit.     Psychiatric: Affect is pleasant & appropriate.  Patient appears motivated to improve health.     Respiratory: Breathing is regular & unlabored while sitting.     HEENT: Hearing is intact to spoken word.  Speech is clear.  No gross evidence of visual impairment that would impact ambulation.     Dermatologic: R 2nd toe " distal portion with swollen, necrotic, malodorous tissue.  Deep probing wounds at end of toe, dorsal DIPJ.  Purulent drainage.  Erythema and edema into forefoot.  Some erythema into R lower leg as well.  Callus to tip of L 2nd toe, plantar 1st MPJ.  No wounds on L.     Vascular: DP & PT pulses are intact & regular bilaterally.  CFT and skin temperature are normal to both lower extremities.     Neurologic: Lower extremity sensation is diminished to light touch b/l.       Musculoskeletal: Some pain extending up leg per pt.  Patient is ambulatory without assistive device or brace.  No gross ankle deformity noted.  No foot or ankle joint effusion is noted.       TWO VIEWS OF THE TOES OF THE RIGHT FOOT   1/5/2019 3:31 PM     HISTORY: 2nd toe swelling, bruising, and cellulitis.     COMPARISON: None.                                                                      IMPRESSION: No fracture or osseous lesion is seen. There is prominent  hallux valgus with moderate to advanced degenerative changes of the  first metatarsophalangeal joint. No other significant joint space  pathology is seen. There is prominent soft tissue swelling surrounding  the distal phalanx of the second toe. Small areas of decreased  attenuation distally on the frontal view may represent gas or a soft  tissue defect. Clinical correlation is recommended. No underlying  cortical erosive changes are seen to suggest osteomyelitis. No other  abnormality is seen.     TALHA OTTO MD      XR shows gas concerning for acute necrotizing infection.      Venous duplex neg for DVT.      Lab Results   Component Value Date    WBC 6.9 01/06/2019     Lab Results   Component Value Date    RBC 4.16 01/06/2019     Lab Results   Component Value Date    HGB 11.9 01/06/2019     Lab Results   Component Value Date    HCT 36.0 01/06/2019     No components found for: MCT  Lab Results   Component Value Date    MCV 87 01/06/2019     Lab Results   Component Value Date    MCH  28.6 01/06/2019     Lab Results   Component Value Date    MCHC 33.1 01/06/2019     Lab Results   Component Value Date    RDW 12.9 01/06/2019     Lab Results   Component Value Date     01/06/2019

## 2019-01-07 ENCOUNTER — APPOINTMENT (OUTPATIENT)
Dept: PHYSICAL THERAPY | Facility: CLINIC | Age: 69
DRG: 255 | End: 2019-01-07
Payer: COMMERCIAL

## 2019-01-07 LAB
ANION GAP SERPL CALCULATED.3IONS-SCNC: 5 MMOL/L (ref 3–14)
BASOPHILS # BLD AUTO: 0 10E9/L (ref 0–0.2)
BASOPHILS NFR BLD AUTO: 0.2 %
BUN SERPL-MCNC: 9 MG/DL (ref 7–30)
CALCIUM SERPL-MCNC: 8 MG/DL (ref 8.5–10.1)
CHLORIDE SERPL-SCNC: 103 MMOL/L (ref 94–109)
CO2 SERPL-SCNC: 29 MMOL/L (ref 20–32)
CREAT SERPL-MCNC: 0.83 MG/DL (ref 0.66–1.25)
DIFFERENTIAL METHOD BLD: ABNORMAL
EOSINOPHIL # BLD AUTO: 0.2 10E9/L (ref 0–0.7)
EOSINOPHIL NFR BLD AUTO: 3.4 %
ERYTHROCYTE [DISTWIDTH] IN BLOOD BY AUTOMATED COUNT: 13 % (ref 10–15)
GFR SERPL CREATININE-BSD FRML MDRD: 90 ML/MIN/{1.73_M2}
GLUCOSE BLDC GLUCOMTR-MCNC: 211 MG/DL (ref 70–99)
GLUCOSE BLDC GLUCOMTR-MCNC: 214 MG/DL (ref 70–99)
GLUCOSE BLDC GLUCOMTR-MCNC: 253 MG/DL (ref 70–99)
GLUCOSE BLDC GLUCOMTR-MCNC: 268 MG/DL (ref 70–99)
GLUCOSE BLDC GLUCOMTR-MCNC: 299 MG/DL (ref 70–99)
GLUCOSE SERPL-MCNC: 221 MG/DL (ref 70–99)
HCT VFR BLD AUTO: 35.8 % (ref 40–53)
HGB BLD-MCNC: 12.1 G/DL (ref 13.3–17.7)
IMM GRANULOCYTES # BLD: 0 10E9/L (ref 0–0.4)
IMM GRANULOCYTES NFR BLD: 0.4 %
LYMPHOCYTES # BLD AUTO: 1.2 10E9/L (ref 0.8–5.3)
LYMPHOCYTES NFR BLD AUTO: 21.4 %
MCH RBC QN AUTO: 29.4 PG (ref 26.5–33)
MCHC RBC AUTO-ENTMCNC: 33.8 G/DL (ref 31.5–36.5)
MCV RBC AUTO: 87 FL (ref 78–100)
MONOCYTES # BLD AUTO: 0.6 10E9/L (ref 0–1.3)
MONOCYTES NFR BLD AUTO: 11.3 %
NEUTROPHILS # BLD AUTO: 3.5 10E9/L (ref 1.6–8.3)
NEUTROPHILS NFR BLD AUTO: 63.3 %
NRBC # BLD AUTO: 0 10*3/UL
NRBC BLD AUTO-RTO: 0 /100
PLATELET # BLD AUTO: 189 10E9/L (ref 150–450)
PLATELET # BLD EST: ABNORMAL 10*3/UL
POTASSIUM SERPL-SCNC: 3.8 MMOL/L (ref 3.4–5.3)
RBC # BLD AUTO: 4.12 10E12/L (ref 4.4–5.9)
RBC MORPH BLD: ABNORMAL
SODIUM SERPL-SCNC: 137 MMOL/L (ref 133–144)
VANCOMYCIN SERPL-MCNC: 14.5 MG/L
VARIANT LYMPHS BLD QL SMEAR: PRESENT
WBC # BLD AUTO: 5.6 10E9/L (ref 4–11)

## 2019-01-07 PROCEDURE — 36569 INSJ PICC 5 YR+ W/O IMAGING: CPT

## 2019-01-07 PROCEDURE — 99207 ZZC CDG-MDM COMPONENT: MEETS LOW - DOWN CODED: CPT | Performed by: INTERNAL MEDICINE

## 2019-01-07 PROCEDURE — 97530 THERAPEUTIC ACTIVITIES: CPT | Mod: GP | Performed by: PHYSICAL THERAPIST

## 2019-01-07 PROCEDURE — 99232 SBSQ HOSP IP/OBS MODERATE 35: CPT | Performed by: INTERNAL MEDICINE

## 2019-01-07 PROCEDURE — 00000146 ZZHCL STATISTIC GLUCOSE BY METER IP

## 2019-01-07 PROCEDURE — 40000193 ZZH STATISTIC PT WARD VISIT: Performed by: PHYSICAL THERAPIST

## 2019-01-07 PROCEDURE — 27211289 ZZ H KIT CATH IV 4FR 8 CM OR 10 CM, POWERWAND QUICK XL

## 2019-01-07 PROCEDURE — 25000131 ZZH RX MED GY IP 250 OP 636 PS 637: Performed by: INTERNAL MEDICINE

## 2019-01-07 PROCEDURE — 97116 GAIT TRAINING THERAPY: CPT | Mod: GP | Performed by: PHYSICAL THERAPIST

## 2019-01-07 PROCEDURE — 97161 PT EVAL LOW COMPLEX 20 MIN: CPT | Mod: GP | Performed by: PHYSICAL THERAPIST

## 2019-01-07 PROCEDURE — 12000000 ZZH R&B MED SURG/OB

## 2019-01-07 PROCEDURE — 80048 BASIC METABOLIC PNL TOTAL CA: CPT | Performed by: PODIATRIST

## 2019-01-07 PROCEDURE — 80202 ASSAY OF VANCOMYCIN: CPT | Performed by: INTERNAL MEDICINE

## 2019-01-07 PROCEDURE — 36415 COLL VENOUS BLD VENIPUNCTURE: CPT | Performed by: PODIATRIST

## 2019-01-07 PROCEDURE — L3260 AMBULATORY SURGICAL BOOT EAC: HCPCS

## 2019-01-07 PROCEDURE — 36415 COLL VENOUS BLD VENIPUNCTURE: CPT | Performed by: INTERNAL MEDICINE

## 2019-01-07 PROCEDURE — 25000128 H RX IP 250 OP 636: Performed by: INTERNAL MEDICINE

## 2019-01-07 PROCEDURE — 25000132 ZZH RX MED GY IP 250 OP 250 PS 637: Performed by: INTERNAL MEDICINE

## 2019-01-07 PROCEDURE — 85025 COMPLETE CBC W/AUTO DIFF WBC: CPT | Performed by: PODIATRIST

## 2019-01-07 RX ORDER — LIDOCAINE 40 MG/G
CREAM TOPICAL
Status: DISCONTINUED | OUTPATIENT
Start: 2019-01-07 | End: 2019-01-11 | Stop reason: CLARIF

## 2019-01-07 RX ADMIN — VANCOMYCIN HYDROCHLORIDE 2000 MG: 10 INJECTION, POWDER, LYOPHILIZED, FOR SOLUTION INTRAVENOUS at 08:05

## 2019-01-07 RX ADMIN — INSULIN ASPART 6 UNITS: 100 INJECTION, SOLUTION INTRAVENOUS; SUBCUTANEOUS at 12:43

## 2019-01-07 RX ADMIN — INSULIN ASPART 6 UNITS: 100 INJECTION, SOLUTION INTRAVENOUS; SUBCUTANEOUS at 17:59

## 2019-01-07 RX ADMIN — INSULIN ASPART 3 UNITS: 100 INJECTION, SOLUTION INTRAVENOUS; SUBCUTANEOUS at 08:05

## 2019-01-07 RX ADMIN — INSULIN ASPART 3 UNITS: 100 INJECTION, SOLUTION INTRAVENOUS; SUBCUTANEOUS at 17:59

## 2019-01-07 RX ADMIN — SIMVASTATIN 40 MG: 40 TABLET, FILM COATED ORAL at 22:07

## 2019-01-07 RX ADMIN — TAZOBACTAM SODIUM AND PIPERACILLIN SODIUM 3.38 G: 375; 3 INJECTION, SOLUTION INTRAVENOUS at 18:00

## 2019-01-07 RX ADMIN — ISOSORBIDE MONONITRATE 30 MG: 30 TABLET, EXTENDED RELEASE ORAL at 08:04

## 2019-01-07 RX ADMIN — LISINOPRIL 20 MG: 20 TABLET ORAL at 19:54

## 2019-01-07 RX ADMIN — VANCOMYCIN HYDROCHLORIDE 2000 MG: 10 INJECTION, POWDER, LYOPHILIZED, FOR SOLUTION INTRAVENOUS at 20:15

## 2019-01-07 RX ADMIN — LISINOPRIL 20 MG: 20 TABLET ORAL at 08:04

## 2019-01-07 RX ADMIN — INSULIN ASPART 6 UNITS: 100 INJECTION, SOLUTION INTRAVENOUS; SUBCUTANEOUS at 08:25

## 2019-01-07 RX ADMIN — VITAMIN D, TAB 1000IU (100/BT) 2000 UNITS: 25 TAB at 19:54

## 2019-01-07 RX ADMIN — TAZOBACTAM SODIUM AND PIPERACILLIN SODIUM 3.38 G: 375; 3 INJECTION, SOLUTION INTRAVENOUS at 04:44

## 2019-01-07 RX ADMIN — PANTOPRAZOLE SODIUM 40 MG: 40 TABLET, DELAYED RELEASE ORAL at 08:04

## 2019-01-07 RX ADMIN — AMLODIPINE BESYLATE 5 MG: 5 TABLET ORAL at 08:04

## 2019-01-07 RX ADMIN — TAZOBACTAM SODIUM AND PIPERACILLIN SODIUM 3.38 G: 375; 3 INJECTION, SOLUTION INTRAVENOUS at 12:44

## 2019-01-07 RX ADMIN — METOPROLOL SUCCINATE 50 MG: 50 TABLET, EXTENDED RELEASE ORAL at 22:07

## 2019-01-07 RX ADMIN — LEVOTHYROXINE SODIUM 137 MCG: 137 TABLET ORAL at 22:07

## 2019-01-07 ASSESSMENT — ACTIVITIES OF DAILY LIVING (ADL)
ADLS_ACUITY_SCORE: 12
ADLS_ACUITY_SCORE: 13

## 2019-01-07 NOTE — PLAN OF CARE
A/O, VSS up with assist of 1 heel touch weight bearing RLE.  Orthotics consult for a shoe.  On vanco and zosyn for antibiotics baseline bilateral neuropathy Pink rash like area upper right leg.  Cellulitis RLE/foot red with in outlined area

## 2019-01-07 NOTE — PLAN OF CARE
Discharge Planner PT   68 yr old male with hx of CAD, HTN, a-fib, IDDM, peripheral neuropahty, GERD, CVA, hypothyroidism, diabetic foot infection with cellulitis and 3rd toe amp Sept '18 who now presents with R LE cellulitis and necrotic wound on 2nd toe of R foot.  Pt underwent partial toe amp on 1/6.  Pt lives with his wife in a 2 level home with 2 steps to enter with B posts to hold onto and a stair lift to get to his bedroom on the 2nd floor.  Pt has a 4WW, knee scooter, manual WC and SEC at home.    Patient plan for discharge: Home with wife  Current status: Pt is I with bed mobility, S/SBA with sit <> stand to a 4WW or without a device.  Pt amb 200' with a 4WW and R post op shoe with SBA.  Frequent VCs for R heel WB only in post op shoe.  Pt demonstrates R foot flat gait pattern, reporting he can't feel his feet and therefore has a hard time keeping his toes up (pt has 4/5 dorsiflexor strength).  Pt also amb up/down curb step with CGA to enable him to enter his home.  Barriers to return to prior living situation; none  Recommendations for discharge: Home with wife  Rationale for recommendations: Pt I with all bed mobility and S/SBA with transfers and gait.  No continued PT necessary after discharge as pt should allow surgical wound to heel and limit gait due to poor adherence to R heel WB in ortho shoe.         Entered by: Airam Ibarra 01/07/2019 12:21 PM

## 2019-01-07 NOTE — PHARMACY-VANCOMYCIN DOSING SERVICE
Pharmacy Vancomycin Initial Note  Date of Service 2019  Patient's  1950  68 year old, male    Indication: Sepsis (SSTI)    Current estimated CrCl = Estimated Creatinine Clearance: 126.5 mL/min (based on SCr of 0.77 mg/dL).  Creatinine for last 3 days  2019:  3:59 PM Creatinine 0.79 mg/dL  2019:  6:52 AM Creatinine 0.77 mg/dL      Nephrotoxins and other renal medications (From now, onward)    Start     Dose/Rate Route Frequency Ordered Stop    19 2200  piperacillin-tazobactam (ZOSYN) infusion 3.375 g      3.375 g  100 mL/hr over 30 Minutes Intravenous EVERY 6 HOURS 19 18019  lisinopril (PRINIVIL/ZESTRIL) tablet 20 mg      20 mg Oral 2 TIMES DAILY 19 180            Contrast Orders - past 72 hours (72h ago, onward)    Start     Dose/Rate Route Frequency Ordered Stop    19 1700  gadobutrol (GADAVIST) injection 10 mL      10 mL Intravenous ONCE 19 1652 19 1707            Plan:  1.  Start vancomycin  2000 mg (~ 17.5 mg/kg) IV q12h.   2.  Goal Trough Level: 15-20 mg/L   3.  Pharmacy will check trough levels as appropriate in 1-3 Days.    4. Serum creatinine levels will be ordered daily for the first week of therapy and at least twice weekly for subsequent weeks.    5. Newton method utilized to dose vancomycin therapy: Method 1    Edgardo Frausto, PharmD

## 2019-01-07 NOTE — PROGRESS NOTES
[facility]  Hospitalist Progress Note  Sabino Barbour MD 01/07/19    Reason for Stay (Diagnosis): diabetic foot infection         Assessment and Plan:      Summary of Stay: Jayro Elder is a 68 year old male with past medical history including insulin-dependent diabetes, known issues with prior diabetic foot infections, coronary artery disease with stents, cardiomyopathy, A. fib on Eliquis, untreated sleep apnea and stroke among others admitted on 1/5/2019 with necrotic appearing right second toe with surrounding foot and leg erythema.  He was admitted for broad-spectrum IV antibiotics and has been seen by podiatry with plans to take him to the operating room for right second toe amputation on 1/6 with findings compatible w/ gas gangrene and osteomyelitis.    Problem List/Assessment and Plan:   1. Gas-forming infection of right second toe s/p partial amputation on 1/6: Remains on broad-spectrum antibiotics  --Continue vancomycin and Zosyn  --Appreciate Podiatry guidance and management  --We will need to await culture results, potential need for repeat surgery.  --ID consult    2.   Uncontrolled insulin-dependent diabetes:   --Currently on 35 units of U300 insulin twice daily as well as sliding scale insulin.  Control improved after admission.  Blood sugars 400s in the ER.  --Restart scheduled mealtime insulin at 6 units 3 times daily with meals in addition to sliding scale.  At home often uses up to 7-10 units.  Titrate.    3.   Atrial fibrillation on Eliquis: Had held Eliquis upon admission, will restart pending surgical plan.  It seems likely that he may need to return to the operating room so we will hold off today and reevaluate starting tomorrow.    4.   History of ischemic cardiomyopathy, coronary artery disease on Plavix.:  Stent reportedly was placed back in 2009.  -- hold Plavix for now pending surgical plan.  --Okay to resume lisinopril 20 mg twice daily, Toprol-XL at bedtime  "and amlodipine 5 mg daily  --EF 35-40% in September 2018.  Monitor for fluid overload.    5.   History of CVA      DVT Prophylaxis: Hold Eliquis pending surgical plan  Code Status: Full Code  Discharge Dispo/Date: At least 3 or 4 more days likely        Interval History (Subjective):      Status post partial amputation of his second toe yesterday  Restarting mealtime insulin today  Awaiting cultures  Consulting ID  Leg pain much better, headache gone  Placing midline as he is difficult to maintain access on and likely will need at least a few more days of IV abx, potentially longer.                  Physical Exam:      Last Vital Signs:  BP (!) 149/91   Pulse 75   Temp 97.2  F (36.2  C)   Resp 16   Ht 1.93 m (6' 4\")   Wt 113.4 kg (250 lb)   SpO2 99%   BMI 30.43 kg/m      I/O last 3 completed shifts:  In: 840 [P.O.:240; I.V.:600]  Out: -     General: Alert, awake, no acute distress.  HEENT: NC/AT, eyes anicteric, external occular movements intact, face symmetric.  Dentition WNL, MM moist.  Cardiac: RRR, S1, S2.  No murmurs appreciated.  Pulmonary: Normal chest rise, normal work of breathing.  Lungs CTA BL  Abdomen: soft, non-tender, non-distended.  Bowel Sounds Present.  No guarding.  Extremities: right foot dressed, erythema still present within outlined markings of LLE.  defer detailed exam to podiatry today.  He has an ulcer on the plantar aspect of his left foot as well but no surrounding erythema.  No deformities.  Warm, well perfused.  Skin: no rashes or lesions noted.  Warm and Dry.  Neuro: No focal deficits noted.  Speech clear.  Coordination and strength grossly normal.  Psych: Appropriate affect.         Medications:      All current medications were reviewed with changes reflected in problem list.         Data:      All new lab and imaging data was reviewed.   Labs:  Recent Labs   Lab 01/07/19  0727      POTASSIUM 3.8   CHLORIDE 103   CO2 29   ANIONGAP 5   *   BUN 9   CR 0.83 "   GFRESTIMATED 90   GFRESTBLACK >90   BETTIE 8.0*     Recent Labs   Lab 01/07/19  0727   WBC 5.6   HGB 12.1*   HCT 35.8*   MCV 87         Imaging:   Recent Results (from the past 48 hour(s))   XR Toe Right G/E 2 Views    Narrative    TWO VIEWS OF THE TOES OF THE RIGHT FOOT   1/5/2019 3:31 PM    HISTORY: 2nd toe swelling, bruising, and cellulitis.    COMPARISON: None.      Impression    IMPRESSION: No fracture or osseous lesion is seen. There is prominent  hallux valgus with moderate to advanced degenerative changes of the  first metatarsophalangeal joint. No other significant joint space  pathology is seen. There is prominent soft tissue swelling surrounding  the distal phalanx of the second toe. Small areas of decreased  attenuation distally on the frontal view may represent gas or a soft  tissue defect. Clinical correlation is recommended. No underlying  cortical erosive changes are seen to suggest osteomyelitis. No other  abnormality is seen.    TALHA OTTO MD   US Lower Extremity Venous Duplex Right    Narrative    VENOUS ULTRASOUND RIGHT LOWER EXTREMITY  1/6/2019 11:35 AM     HISTORY: Right leg pain, warmth. Does have right foot infection,  though consider deep venous thrombosis.    COMPARISON: None.    FINDINGS:  Examination of the deep veins with graded compression and  color flow Doppler with spectral wave form analysis shows no evidence  of thrombus in the common femoral vein, femoral vein, popliteal vein  or calf veins.  There is no venous insufficiency.      Impression    IMPRESSION: No evidence of deep venous thrombosis.    MD Sabino PEREZ MD.

## 2019-01-07 NOTE — PLAN OF CARE
Pt A&O x4. VS stable; afebrile. Denies pain. CMS: baseline numbness and tingling in BLE's. Dressing CDI-ace wrap on. Elevated and iced. Cellulitis remains within marked boarders. Continues on IV Zosyn and Vanco. Up w/ SBA, using gait belt and ortho shoe-heel touch weight bearing. Voiding in good amts. Tolerating CHO diet. Will continue to monitor.

## 2019-01-07 NOTE — CONSULTS
ID consult dictated IMP 1 69 yo male with acute R foot DFI, op 2nd toe amp, cx strep     REC continue same await final cxs and op, assuming all infected bone out not bacteremic this time so likely po

## 2019-01-07 NOTE — OP NOTE
Procedure Date: 01/06/2019      SURGEON:  Sabino Garcia DPM      PREOPERATIVE DIAGNOSES:     1.  Right second toe gas gangrene.   2.  Diabetes with peripheral neuropathy.      POSTOPERATIVE DIAGNOSES:     1.  Right second toe gas gangrene, osteomyelitis.   2.  Diabetes with peripheral neuropathy.      PROCEDURE:  Right open second toe partial amputation.      ANESTHESIA:  General.      HEMOSTASIS:  Electrocautery.      ESTIMATED BLOOD LOSS:  5 mL.      SPECIMENS:   1.  Right second toe wound tissue for culture, aerobic, anaerobic and Gram stain.   2.  Right second toe bone for culture, aerobic and anaerobic.   3.  Right second toe for pathology to evaluate for osteomyelitis.      INDICATIONS:  This is a 68-year-old diabetic male who was admitted to [facility] for a severe right lower extremity infection.  He has an obvious necrotic right second toe at its distal portion.  The toe is extremely edematous with necrotic and purulent tissue breakdown.  Wound probes directly to bone distally.  X-ray examination revealed gas in the soft tissues of the distal right second toe.  An emergent toe amputation was recommended.  The patient agreed.  Risks versus benefits discussed at length prior.  Risks include but are not limited to continued infection, open wound, delayed wound healing, further amputation, blood clot, loss of function, need for additional surgery.  I made it clear to the patient that we will likely need to leave this surgical site open today to allow for resolution of erythema which is significant.  He will likely require more than one surgery.     DESCRIPTION OF PROCEDURE:  All questions were answered to the patient's satisfaction.  Consent form was signed and placed in chart.  The patient was brought into the operating room by the anesthesia team and placed supine on the table.  After general sedation was administered, foot was exsanguinated and a pneumatic ankle tourniquet, which was well padded,  was elevated.      Attention was directed to the right second toe where full thickness incision was carried out to bone circumferentially with a 15 blade.  Toe was disarticulated at the proximal interphalangeal joint.  Incision was made behind any clearly nonviable necrotic tissue.  Tourniquet was dropped at this time.  Good bleeding was noted from the surgical site.  Area underwent copious lavage with sterile saline.  Hemostasis was achieved with electrocautery.  Due to the level of erythema and infection, however, I did not feel that primary closure would be appropriate at this time.  He does have exposed bone in the wound, specifically the head of the proximal phalanx.  This was not resected at this time to diminish bleeding risk as the patient has been on Eliquis.  I am also planning on obtaining MRI to evaluate for residual osteomyelitis and leaving this bone intact will help ensure more accurate results.  Regardless, patient will need to return the operating room for revision and hopeful delayed primary closure.      After final saline irrigation, a sterile dressing was applied to the right foot.      The patient was awakened from anesthesia and returned to the PACU with vital signs stable and vascular status intact.  He tolerated the procedure and anesthesia well.      PLAN:  He will be returned to the inpatient floor.  He will continue on IV antibiotics.  Our service will continue to follow.  As stated, he will need to return the operating room in 2-3 days for likely revision and delayed primary closure.         KEILY JACKSON DPM             D: 2019   T: 2019   MT: NANCY      Name:     PORTER YANCEY   MRN:      [id number]        Account:        [id number]   :      1950           Procedure Date: 2019      Document: [id number]

## 2019-01-07 NOTE — PROGRESS NOTES
Fit patient with post op shoe.  He thought he was getting the Darco wedge shoe.  I discussed with him that I will usually see just use of post op shoe because sometimes the lifted sold of the other can cause unbalance.  Advised that people will do heel touch weight bearing in the post op shoe.  Told him to make sure shows podiatry the post op shoe and make sure they are ok with it.  If not to call orthotics.  Thanks, Kal OWUSU.

## 2019-01-07 NOTE — PROVIDER NOTIFICATION
Prior to the start of the Midline procedure and with procedural staff participation, I verbally confirmed the patient s identity using two indicators, relevant allergies, that the procedure was appropriate and matched the consent or emergent situation, and that the correct equipment/implants were available. Immediately prior to starting the procedure I conducted the Time Out with the procedural staff and re-confirmed the patient s name, procedure, and site/side. (The Joint Commission universal protocol was followed.)  Yes    Sedation (Moderate or Deep): None  With Iliana Perez RN

## 2019-01-07 NOTE — PLAN OF CARE
Nurse from [id number]    Pt arrived pacu from pacu around 1615. Pt alert and orientated. Pts wife present. Pt denies pain. Pt has baseline neuropathy to BLES from knees down to feet. Surgical dsg to RLE is CDI. Pt tolerated CHO diet post op. Pt still due to void but denied urge yet. Pt has order for orthotics to see him and is now heel WB on RLE. Pt and wife made aware of the situation. Pt on IV antibiotics. Plan will be home on discharge.

## 2019-01-07 NOTE — PROGRESS NOTES
01/07/19 0800   Quick Adds   Type of Visit Initial PT Evaluation   Living Environment   Lives With spouse   Living Arrangements house   Home Accessibility stairs to enter home;stairs within home   Living Environment Comment Pt lives in a 2 story home with a basement (doesn't use) with 2 steps to enter with B posts to hold on to.  Pt uses a stair lift to access his bedroom upstairs.     Self-Care   Usual Activity Tolerance good   Current Activity Tolerance moderate   Equipment Currently Used at Home cane, straight;shower chair;walker, rolling;wheelchair, manual  (4WW and a knee scooter)   Activity/Exercise/Self-Care Comment Pt reports that he used the SEC, 4WW and knee scooter after his last surgery in Sept.  Pt had progressed to not needing any AD for gait prior to admit.  Pt doesn't drive very often.  Wife drives.  Patient tries to help with household chores (cooking, cleaning, laundry) due to LE pain and weakness.  Pt was I with ADLs.  Pt retired - worked at [employer].   Functional Level Prior   Ambulation 0-->independent   Transferring 0-->independent   Toileting 0-->independent   Bathing 0-->independent   Communication 0-->understands/communicates without difficulty   Swallowing 0-->swallows foods/liquids without difficulty   Cognition 0 - no cognition issues reported   Fall history within last six months yes   Number of times patient has fallen within last six months 1   Which of the above functional risks had a recent onset or change? ambulation   General Information   Onset of Illness/Injury or Date of Surgery - Date 01/06/19   Referring Physician Dr. Garcia   Patient/Family Goals Statement Pt agreeable with therapy goals   Pertinent History of Current Problem (include personal factors and/or comorbidities that impact the POC) 68 yr old male with hx of CAD, HTN, a-fib, IDDM, peripheral neuropahty, GERD, CVA, hypothyroidism, diabetic foot infection with cellulitis and 3rd toe amp Sept '18 who now presents  "with R LE cellulitis and necrotic wound on 2nd toe of R foot.  Pt underwent partial toe amp on 1/6.   Precautions/Limitations fall precautions   Weight-Bearing Status - LLE full weight-bearing   Weight-Bearing Status - RLE toe touch weight-bearing;other (see comments)  (R heel WB in post op shoe)   General Observations Pt lying in bed alert and agreeable to therapy   Cognitive Status Examination   Orientation orientation to person, place and time   Level of Consciousness alert   Follows Commands and Answers Questions 100% of the time;able to follow single-step instructions   Personal Safety and Judgment intact   Memory intact   Pain Assessment   Patient Currently in Pain Yes, see Vital Sign flowsheet  (1/10 R foot)   Integumentary/Edema   Integumentary/Edema Comments R foot incision   Posture    Posture Forward head position;Protracted shoulders   Range of Motion (ROM)   ROM Comment Pt presents with decreased hamstring and gastroc length with B SLR to ~ 60 deg and B DF to ~ 15-10 deg   Strength   Strength Comments R hip flex 4+/5, L 5/5, R knee ext 4+/5, L 5/5, L ankle DF 5/5   Bed Mobility   Bed Mobility Comments Sup > sit independently without bedrail.     Transfer Skills   Transfer Comments Sit <> stand S/MI to 4WW or without AD.   Gait   Gait Comments Pt amb 200' with 4WW and S/MI with assist at IV pole.  Pt wearing R post op shoe and L slipper.  Frequent VCs to WB on R heel only.  Pt tends to amb with R foot flat. Pt report, \"I can't feel my foot.\"  Pt advised to look down occasionally to ensure heel WB; however, pt states that he can't see due to hx of strokes.  Pt demonstrates slow steady gait with 4WW. Pt refusing to use FWW in his room (4WW stays in therapy dept to use with all pts)  Pt reports he feels that the FWW is not safe and would rather walk without any walker than use it.  Pt advised to use 4WW, knee scooter or L SEC at home for increased safety and ease with maintaining R heel WB.  Pt also amb " "up/down curb step with 4WW and CGA with VCs to step up with L and down with R LE.     Balance   Balance Comments Pt able to sit on EOB independently   Sensory Examination   Sensory Perception Comments Pt reports numbness in B feet.   Coordination   Coordination no deficits were identified   Muscle Tone   Muscle Tone no deficits were identified   General Therapy Interventions   Planned Therapy Interventions gait training;transfer training;risk factor education;home program guidelines;progressive activity/exercise   Clinical Impression   Criteria for Skilled Therapeutic Intervention yes, treatment indicated   PT Diagnosis impaired gait   Influenced by the following impairments R heel WB only, impaired gait pattern, decreased activity tolerance   Functional limitations due to impairments difficulty with stairs, unable to amb longer distances due to poor follow through of heel WB restrictions   Clinical Presentation Stable/Uncomplicated   Clinical Presentation Rationale Pt progressing s/p partial toe amp as expected.   Clinical Decision Making (Complexity) Low complexity   Therapy Frequency` 5 times/week   Predicted Duration of Therapy Intervention (days/wks) 3 days   Anticipated Discharge Disposition Home with Assist   Risk & Benefits of therapy have been explained Yes   Patient, Family & other staff in agreement with plan of care Yes   [facility] [employer] TM \"6 Clicks\"   2016, Trustees of [facility], under license to [employer].  All rights reserved.   6 Clicks Short Forms Basic Mobility Inpatient Short Form   [facility] AM-PAC  \"6 Clicks\" V.2 Basic Mobility Inpatient Short Form   1. Turning from your back to your side while in a flat bed without using bedrails? 4 - None   2. Moving from lying on your back to sitting on the side of a flat bed without using bedrails? 4 - None   3. Moving to and from a bed to a chair (including a wheelchair)? 4 - None   4. Standing up from a chair using your arms " (e.g., wheelchair, or bedside chair)? 4 - None   5. To walk in hospital room? 4 - None   6. Climbing 3-5 steps with a railing? 3 - A Little   Basic Mobility Raw Score (Score out of 24.Lower scores equate to lower levels of function) 23   Total Evaluation Time   Total Evaluation Time (Minutes) 15

## 2019-01-07 NOTE — PROCEDURES
[facility]   Procedure Note           Peripherally Inserted Central Line Catheter (PICC):       Jayro Elder  MRN# [id number]   January 7, 2019, 11:39 AM Indication: Medication administration           Pause for the cause: Time out completed  Patient ID's verified using two distinct indicators  All necessary equipment is present   Type of line to be used: Midline catheter   Full barrier precautions used: Yes   Skin preparation: Chloraprep   Date of insertion: January 7, 2019, 10:35 AM   Device type: Single lumen, non-valved, 18G   Catheter brand: [employer]   Lot number: [id number]   Insertion location: Right cephalic vein   Method of placement: Ultrasound   Number of attempts: With ultrasound: 1   Without ultrasound: 0   Difficulty threading: No   Midline IV device: Dressing changed  Transparent semmipermeable dressing applied  Chlorhexidine patch  Catheter securement device   Arm circumference: Adults 10 cm   Midline extremity circumference: 35 cm   Internal length: 10 cm   Midline visible catheter length: 0 cm   Total catheter length: 10 cm   Tip termination: Axilla (midline)   Method of verification: Not applicable   Midline patency post placement: Positive blood return  Flushes without difficulty  Saline locked       Placement verified by: Registered Nurse   Catheter placed by: Payam Bergman   Discontinuation form initiated: No   Patient tolerance: Tolerated well      Summary:  This procedure was performed without difficulty and he tolerated the procedure well with no immediate complications.       Recorded by Payam Bergman    Attestation:  Payam DELA CRUZ RN, personally performed the above procedure on this the patient.

## 2019-01-07 NOTE — PROGRESS NOTES
"Podiatry / Foot and Ankle Surgery Progress Note    January 7, 2019    Subject: Patient was seen at bedside.  No pain to right foot. Notes pain to left foot. Concerns that it may go the same  as the right.    Objective:  Vitals: BP (!) 149/91   Pulse 75   Temp 97.2  F (36.2  C)   Resp 16   Ht 1.93 m (6' 4\")   Wt 113.4 kg (250 lb)   SpO2 97%   BMI 30.43 kg/m    BMI= Body mass index is 30.43 kg/m .     WBC   Date Value Ref Range Status   01/07/2019 5.6 4.0 - 11.0 10e9/L Final     A1C: 11.7 (1/2019)    General:  Patient is alert and orientated.  NAD  Right foot; Dressing is c/d/i. Bone exposed. Redness resolving well.   Left  Foot: hammertoe left 2nd toe with pre ulcerative callus to distal  Left 2nd toe and plantar left  1st metatarsal head. Pain on palpation. No redness.     Imaging: MRI right foot: There may be mild focal osteomyelitis involving the distal tip of the second proximal phalanx.    Cultures:  Streptococcus dysgalactiae serogroup C/G    Pathology: pending    Assessment: 68 yr old uncontrolled diabetic male with gas gangrene s/p right 2nd toe amputation.     Plan:    -Dressing changed.   -discussed MRI results with patient.   -scheduled for revisional I&D and possible closure rightfoot and left 2nd tenotomy tomorrow with Dr. Bhagat at 4:30pm.   -NPO after 7am tomorrow morning.   -keep foot and dressing dry.  -minimal heel weight bearing on right foot with offloading shoe/boot.    Татьяна Mckeon DPM, Podiatry/Foot and Ankle Surgery  1:03 PM    "

## 2019-01-08 ENCOUNTER — APPOINTMENT (OUTPATIENT)
Dept: GENERAL RADIOLOGY | Facility: CLINIC | Age: 69
DRG: 255 | End: 2019-01-08
Payer: COMMERCIAL

## 2019-01-08 ENCOUNTER — ANESTHESIA EVENT (OUTPATIENT)
Dept: SURGERY | Facility: CLINIC | Age: 69
DRG: 255 | End: 2019-01-08
Payer: COMMERCIAL

## 2019-01-08 ENCOUNTER — ANESTHESIA (OUTPATIENT)
Dept: SURGERY | Facility: CLINIC | Age: 69
DRG: 255 | End: 2019-01-08
Payer: COMMERCIAL

## 2019-01-08 LAB
GLUCOSE BLDC GLUCOMTR-MCNC: 173 MG/DL (ref 70–99)
GLUCOSE BLDC GLUCOMTR-MCNC: 174 MG/DL (ref 70–99)
GLUCOSE BLDC GLUCOMTR-MCNC: 175 MG/DL (ref 70–99)
GLUCOSE BLDC GLUCOMTR-MCNC: 195 MG/DL (ref 70–99)
GLUCOSE BLDC GLUCOMTR-MCNC: 196 MG/DL (ref 70–99)
GLUCOSE BLDC GLUCOMTR-MCNC: 198 MG/DL (ref 70–99)

## 2019-01-08 PROCEDURE — 99232 SBSQ HOSP IP/OBS MODERATE 35: CPT | Performed by: INTERNAL MEDICINE

## 2019-01-08 PROCEDURE — 25000125 ZZHC RX 250: Performed by: PODIATRIST

## 2019-01-08 PROCEDURE — 88305 TISSUE EXAM BY PATHOLOGIST: CPT | Mod: 26 | Performed by: PODIATRIST

## 2019-01-08 PROCEDURE — 25000132 ZZH RX MED GY IP 250 OP 250 PS 637: Performed by: INTERNAL MEDICINE

## 2019-01-08 PROCEDURE — 36000052 ZZH SURGERY LEVEL 2 EA 15 ADDTL MIN: Performed by: PODIATRIST

## 2019-01-08 PROCEDURE — 25000128 H RX IP 250 OP 636: Performed by: ANESTHESIOLOGY

## 2019-01-08 PROCEDURE — 71000013 ZZH RECOVERY PHASE 1 LEVEL 1 EA ADDTL HR: Performed by: PODIATRIST

## 2019-01-08 PROCEDURE — 37000008 ZZH ANESTHESIA TECHNICAL FEE, 1ST 30 MIN: Performed by: PODIATRIST

## 2019-01-08 PROCEDURE — 37000009 ZZH ANESTHESIA TECHNICAL FEE, EACH ADDTL 15 MIN: Performed by: PODIATRIST

## 2019-01-08 PROCEDURE — 25000125 ZZHC RX 250: Performed by: ANESTHESIOLOGY

## 2019-01-08 PROCEDURE — 88311 DECALCIFY TISSUE: CPT | Mod: 26 | Performed by: PODIATRIST

## 2019-01-08 PROCEDURE — 25000128 H RX IP 250 OP 636

## 2019-01-08 PROCEDURE — 27210794 ZZH OR GENERAL SUPPLY STERILE: Performed by: PODIATRIST

## 2019-01-08 PROCEDURE — 25000128 H RX IP 250 OP 636: Performed by: INTERNAL MEDICINE

## 2019-01-08 PROCEDURE — 12000000 ZZH R&B MED SURG/OB

## 2019-01-08 PROCEDURE — 28112 PART REMOVAL OF METATARSAL: CPT | Mod: T6 | Performed by: PODIATRIST

## 2019-01-08 PROCEDURE — 40000986 XR TOE RIGHT G/E 2 VIEWS: Mod: RT

## 2019-01-08 PROCEDURE — 0Y6R0Z0 DETACHMENT AT RIGHT 2ND TOE, COMPLETE, OPEN APPROACH: ICD-10-PCS | Performed by: PODIATRIST

## 2019-01-08 PROCEDURE — 25000128 H RX IP 250 OP 636: Performed by: PODIATRIST

## 2019-01-08 PROCEDURE — 88305 TISSUE EXAM BY PATHOLOGIST: CPT | Performed by: PODIATRIST

## 2019-01-08 PROCEDURE — 71000012 ZZH RECOVERY PHASE 1 LEVEL 1 FIRST HR: Performed by: PODIATRIST

## 2019-01-08 PROCEDURE — 40000306 ZZH STATISTIC PRE PROC ASSESS II: Performed by: PODIATRIST

## 2019-01-08 PROCEDURE — 25000125 ZZHC RX 250

## 2019-01-08 PROCEDURE — 88311 DECALCIFY TISSUE: CPT | Performed by: PODIATRIST

## 2019-01-08 PROCEDURE — 00000146 ZZHCL STATISTIC GLUCOSE BY METER IP

## 2019-01-08 PROCEDURE — 36000050 ZZH SURGERY LEVEL 2 1ST 30 MIN: Performed by: PODIATRIST

## 2019-01-08 RX ORDER — BUPIVACAINE HYDROCHLORIDE 2.5 MG/ML
INJECTION, SOLUTION EPIDURAL; INFILTRATION; INTRACAUDAL PRN
Status: DISCONTINUED | OUTPATIENT
Start: 2019-01-08 | End: 2019-01-08 | Stop reason: HOSPADM

## 2019-01-08 RX ORDER — LIDOCAINE 40 MG/G
CREAM TOPICAL
Status: DISCONTINUED | OUTPATIENT
Start: 2019-01-08 | End: 2019-01-08 | Stop reason: HOSPADM

## 2019-01-08 RX ORDER — ONDANSETRON 2 MG/ML
4 INJECTION INTRAMUSCULAR; INTRAVENOUS EVERY 30 MIN PRN
Status: DISCONTINUED | OUTPATIENT
Start: 2019-01-08 | End: 2019-01-08 | Stop reason: HOSPADM

## 2019-01-08 RX ORDER — PROPOFOL 10 MG/ML
INJECTION, EMULSION INTRAVENOUS CONTINUOUS PRN
Status: DISCONTINUED | OUTPATIENT
Start: 2019-01-08 | End: 2019-01-08

## 2019-01-08 RX ORDER — KETAMINE HYDROCHLORIDE 10 MG/ML
INJECTION, SOLUTION INTRAMUSCULAR; INTRAVENOUS PRN
Status: DISCONTINUED | OUTPATIENT
Start: 2019-01-08 | End: 2019-01-08

## 2019-01-08 RX ORDER — SODIUM CHLORIDE, SODIUM LACTATE, POTASSIUM CHLORIDE, CALCIUM CHLORIDE 600; 310; 30; 20 MG/100ML; MG/100ML; MG/100ML; MG/100ML
INJECTION, SOLUTION INTRAVENOUS CONTINUOUS
Status: DISCONTINUED | OUTPATIENT
Start: 2019-01-08 | End: 2019-01-08 | Stop reason: HOSPADM

## 2019-01-08 RX ORDER — LIDOCAINE 40 MG/G
CREAM TOPICAL
Status: DISCONTINUED | OUTPATIENT
Start: 2019-01-08 | End: 2019-01-14 | Stop reason: HOSPADM

## 2019-01-08 RX ORDER — NALOXONE HYDROCHLORIDE 0.4 MG/ML
.1-.4 INJECTION, SOLUTION INTRAMUSCULAR; INTRAVENOUS; SUBCUTANEOUS
Status: DISCONTINUED | OUTPATIENT
Start: 2019-01-08 | End: 2019-01-08 | Stop reason: HOSPADM

## 2019-01-08 RX ORDER — ONDANSETRON 4 MG/1
4 TABLET, ORALLY DISINTEGRATING ORAL EVERY 30 MIN PRN
Status: DISCONTINUED | OUTPATIENT
Start: 2019-01-08 | End: 2019-01-08 | Stop reason: HOSPADM

## 2019-01-08 RX ORDER — FENTANYL CITRATE 50 UG/ML
25-50 INJECTION, SOLUTION INTRAMUSCULAR; INTRAVENOUS
Status: DISCONTINUED | OUTPATIENT
Start: 2019-01-08 | End: 2019-01-08 | Stop reason: HOSPADM

## 2019-01-08 RX ORDER — MAGNESIUM HYDROXIDE 1200 MG/15ML
LIQUID ORAL PRN
Status: DISCONTINUED | OUTPATIENT
Start: 2019-01-08 | End: 2019-01-08 | Stop reason: HOSPADM

## 2019-01-08 RX ORDER — ALBUTEROL SULFATE 0.83 MG/ML
2.5 SOLUTION RESPIRATORY (INHALATION) EVERY 4 HOURS PRN
Status: DISCONTINUED | OUTPATIENT
Start: 2019-01-08 | End: 2019-01-08 | Stop reason: HOSPADM

## 2019-01-08 RX ORDER — MEPERIDINE HYDROCHLORIDE 50 MG/ML
12.5 INJECTION INTRAMUSCULAR; INTRAVENOUS; SUBCUTANEOUS
Status: DISCONTINUED | OUTPATIENT
Start: 2019-01-08 | End: 2019-01-08 | Stop reason: HOSPADM

## 2019-01-08 RX ORDER — HYDRALAZINE HYDROCHLORIDE 20 MG/ML
10 INJECTION INTRAMUSCULAR; INTRAVENOUS EVERY 4 HOURS PRN
Status: DISCONTINUED | OUTPATIENT
Start: 2019-01-08 | End: 2019-01-14 | Stop reason: HOSPADM

## 2019-01-08 RX ORDER — METOPROLOL TARTRATE 1 MG/ML
1-2 INJECTION, SOLUTION INTRAVENOUS EVERY 5 MIN PRN
Status: DISCONTINUED | OUTPATIENT
Start: 2019-01-08 | End: 2019-01-08 | Stop reason: HOSPADM

## 2019-01-08 RX ORDER — HYDROMORPHONE HYDROCHLORIDE 1 MG/ML
.3-.5 INJECTION, SOLUTION INTRAMUSCULAR; INTRAVENOUS; SUBCUTANEOUS EVERY 10 MIN PRN
Status: DISCONTINUED | OUTPATIENT
Start: 2019-01-08 | End: 2019-01-08 | Stop reason: HOSPADM

## 2019-01-08 RX ORDER — DIMENHYDRINATE 50 MG/ML
25 INJECTION, SOLUTION INTRAMUSCULAR; INTRAVENOUS
Status: DISCONTINUED | OUTPATIENT
Start: 2019-01-08 | End: 2019-01-08 | Stop reason: HOSPADM

## 2019-01-08 RX ORDER — HYDRALAZINE HYDROCHLORIDE 20 MG/ML
10 INJECTION INTRAMUSCULAR; INTRAVENOUS ONCE
Status: COMPLETED | OUTPATIENT
Start: 2019-01-08 | End: 2019-01-08

## 2019-01-08 RX ADMIN — TAZOBACTAM SODIUM AND PIPERACILLIN SODIUM 3.38 G: 375; 3 INJECTION, SOLUTION INTRAVENOUS at 11:59

## 2019-01-08 RX ADMIN — VANCOMYCIN HYDROCHLORIDE 2000 MG: 10 INJECTION, POWDER, LYOPHILIZED, FOR SOLUTION INTRAVENOUS at 22:24

## 2019-01-08 RX ADMIN — ISOSORBIDE MONONITRATE 30 MG: 30 TABLET, EXTENDED RELEASE ORAL at 08:11

## 2019-01-08 RX ADMIN — LISINOPRIL 20 MG: 20 TABLET ORAL at 21:31

## 2019-01-08 RX ADMIN — SIMVASTATIN 40 MG: 40 TABLET, FILM COATED ORAL at 21:31

## 2019-01-08 RX ADMIN — VANCOMYCIN HYDROCHLORIDE 2000 MG: 10 INJECTION, POWDER, LYOPHILIZED, FOR SOLUTION INTRAVENOUS at 08:11

## 2019-01-08 RX ADMIN — LEVOTHYROXINE SODIUM 137 MCG: 137 TABLET ORAL at 21:32

## 2019-01-08 RX ADMIN — TAZOBACTAM SODIUM AND PIPERACILLIN SODIUM 3.38 G: 375; 3 INJECTION, SOLUTION INTRAVENOUS at 06:35

## 2019-01-08 RX ADMIN — METOPROLOL TARTRATE 2 MG: 1 INJECTION, SOLUTION INTRAVENOUS at 17:45

## 2019-01-08 RX ADMIN — METOPROLOL SUCCINATE 50 MG: 50 TABLET, EXTENDED RELEASE ORAL at 21:31

## 2019-01-08 RX ADMIN — METOPROLOL TARTRATE 2 MG: 1 INJECTION, SOLUTION INTRAVENOUS at 18:39

## 2019-01-08 RX ADMIN — HYDRALAZINE HYDROCHLORIDE 10 MG: 20 INJECTION INTRAMUSCULAR; INTRAVENOUS at 18:51

## 2019-01-08 RX ADMIN — METOPROLOL TARTRATE 2 MG: 1 INJECTION, SOLUTION INTRAVENOUS at 18:30

## 2019-01-08 RX ADMIN — TAZOBACTAM SODIUM AND PIPERACILLIN SODIUM 3.38 G: 375; 3 INJECTION, SOLUTION INTRAVENOUS at 00:47

## 2019-01-08 RX ADMIN — KETAMINE HYDROCHLORIDE 30 MG: 10 INJECTION, SOLUTION INTRAMUSCULAR; INTRAVENOUS at 16:42

## 2019-01-08 RX ADMIN — LISINOPRIL 20 MG: 20 TABLET ORAL at 08:11

## 2019-01-08 RX ADMIN — MIDAZOLAM 2 MG: 1 INJECTION INTRAMUSCULAR; INTRAVENOUS at 16:45

## 2019-01-08 RX ADMIN — AMLODIPINE BESYLATE 5 MG: 5 TABLET ORAL at 08:12

## 2019-01-08 RX ADMIN — VITAMIN D, TAB 1000IU (100/BT) 2000 UNITS: 25 TAB at 21:32

## 2019-01-08 RX ADMIN — INSULIN ASPART 2 UNITS: 100 INJECTION, SOLUTION INTRAVENOUS; SUBCUTANEOUS at 11:53

## 2019-01-08 RX ADMIN — TAZOBACTAM SODIUM AND PIPERACILLIN SODIUM 3.38 G: 375; 3 INJECTION, SOLUTION INTRAVENOUS at 17:00

## 2019-01-08 RX ADMIN — METOPROLOL TARTRATE 2 MG: 1 INJECTION, SOLUTION INTRAVENOUS at 18:05

## 2019-01-08 RX ADMIN — SODIUM CHLORIDE, POTASSIUM CHLORIDE, SODIUM LACTATE AND CALCIUM CHLORIDE: 600; 310; 30; 20 INJECTION, SOLUTION INTRAVENOUS at 16:10

## 2019-01-08 RX ADMIN — STANDARDIZED SENNA CONCENTRATE AND DOCUSATE SODIUM 1 TABLET: 8.6; 5 TABLET, FILM COATED ORAL at 21:34

## 2019-01-08 RX ADMIN — PANTOPRAZOLE SODIUM 40 MG: 40 TABLET, DELAYED RELEASE ORAL at 08:11

## 2019-01-08 RX ADMIN — PROPOFOL 120 MCG/KG/MIN: 10 INJECTION, EMULSION INTRAVENOUS at 16:48

## 2019-01-08 ASSESSMENT — ACTIVITIES OF DAILY LIVING (ADL)
ADLS_ACUITY_SCORE: 12
ADLS_ACUITY_SCORE: 12
ADLS_ACUITY_SCORE: 13
ADLS_ACUITY_SCORE: 12
ADLS_ACUITY_SCORE: 12

## 2019-01-08 ASSESSMENT — ENCOUNTER SYMPTOMS: DYSRHYTHMIAS: 1

## 2019-01-08 NOTE — ANESTHESIA CARE TRANSFER NOTE
Patient: Jayro W Boldenow    Procedure(s):  RIGHT 2ND TOE AMPUTATION    Diagnosis: unknown  Diagnosis Additional Information: No value filed.    Anesthesia Type:   General, ETT     Note:  Airway :Face Mask  Patient transferred to:PACU  Comments: Pt. To PACU, VSS report to RN      Vitals: (Last set prior to Anesthesia Care Transfer)    CRNA VITALS  1/8/2019 1641 - 1/8/2019 1719      1/8/2019             NIBP:  144/90    NIBP Mean:  111                Electronically Signed By: LORRAINE Hayward CRNA  January 8, 2019  5:19 PM

## 2019-01-08 NOTE — PLAN OF CARE
Vss afebrile. Pt denies pain  LS:clear ra 96%  GI:bs+,  + gas. Pt npo for surgery this afternoon  : voids with out problems  Skin:right  leg redness with in marked area. Right ankle/foot wrapped with ace wrap. ZINA pulse. Numbness/tingling at baseline in feet.   Activity: up with sba of 1 with heel wb only. Pt had special shoe for right foot.   Plan: iv abx,  surgery this afternoon.  Midline iv right upper arm. ,196. Pt declined insulin this am,2 units given with noon reading. Pt showered for surgery.

## 2019-01-08 NOTE — ANESTHESIA PREPROCEDURE EVALUATION
Anesthesia Pre-Procedure Evaluation    Patient: Jayro Elder   MRN: [id number] : 1950          Preoperative Diagnosis: unknown    Procedure(s):  COMBINED INCISION AND DRAINAGE RIGHT FOOT. WITH BONE DEBRIEDMENT AND DELAYED CLOSURE.    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     Cardiomyopathy (H)      Cellulitis of left lower extremity 3/13/2018     Cerebral infarction (H)     eye sight decreased in peripheral of right side and blurry     Diabetic ulcer of left midfoot associated with type 2 diabetes mellitus, with fat layer exposed (H) 3/13/2018     Essential hypertension, benign 2002     Generalized osteoarthrosis, unspecified site 2002     Microalbuminuria 3/13/2018    X1     Myocardial infarction (H)      Neuropathy      Sepsis (H)      Sleep apnea     doesn't use it     Substance abuse (H)      Syncope, unspecified syncope type 3/13/2018     Thyroid disease     takes medicine     Tobacco use disorder 2002     Type 2 diabetes mellitus with diabetic peripheral angiopathy without gangrene, unspecified long term insulin use status 3/13/2018     Type II or unspecified type diabetes mellitus without mention of complication, not stated as uncontrolled 2005     Past Surgical History:   Procedure Laterality Date     AMPUTATE TOE(S) Right 2019    Procedure: Right open second toe partial amputation;  Surgeon: Sabino Garcia DPM;  Location: [facility] OR     ANGIOGRAM  05    subtotal occ.mid LAD,SUSAN mid LAD     ANGIOGRAM      mild CAD,patent stent,no flow-limiting lesions,sev.LV dysf.LAD enlarged     ANGIOGRAM      Sev.single vessel disease,Mod LV dysf.distal LAD 90%,70-75% mid lad just before prev stent,SUSAN to prox.mid LAD, endeavor to distal LAD     ANGIOGRAM  13    restenosis, stent LAD     BACK SURGERY      back surgery x3      C NONSPECIFIC PROCEDURE      Laminectomy x 3 - (1983 x 2 & 1990)     C NONSPECIFIC PROCEDURE Bilateral 1998    Bunionectomy     C NONSPECIFIC PROCEDURE  1959    Gingival surgery at age 9     HEART CATH LEFT HEART CATH  06/13/2017    SUSAN to OM3     INCISION AND DRAINAGE TOE, COMBINED Bilateral 9/11/2018    Procedure: COMBINED INCISION AND DRAINAGE TOE;  1) Irrigation and debridement ulcer left great toe  2) Amputation 3rd toe right foot at distal interphalangeal joint level;  Surgeon: Miki Harp MD;  Location: [facility] OR     ORTHOPEDIC SURGERY      bunion surgery both feet     ORTHOPEDIC SURGERY      left shoulder     SOFT TISSUE SURGERY      debridement of toe numerous time     VASCULAR SURGERY      7 stents in heart     Anesthesia Evaluation     .             ROS/MED HX    ENT/Pulmonary:     (+)sleep apnea, , . .    Neurologic:     (+)CVA     Cardiovascular:     (+) hypertension--CAD, -past MI,-. : . . . :. dysrhythmias a-fib, .       METS/Exercise Tolerance:     Hematologic:         Musculoskeletal:         GI/Hepatic:         Renal/Genitourinary:         Endo:     (+) type II DM thyroid problem .      Psychiatric:         Infectious Disease:         Malignancy:         Other: Comment: .Lab Test        01/07/19 01/06/19 01/05/19      --          09/06/18      --          06/09/17      --          11/03/10                       0727          0652          1559           --           1542           --           1405           --           0940          WBC          5.6          6.9          7.1            < >        11.4*          < >        6.7            < >         --           HGB          12.1*        11.9*        13.2*          < >        13.9           < >        12.4*          < >         --           MCV          87           87           87             < >        87             < >        85             < >         --           PLT          189          182          182            < >         180            < >        223            < >         --           INR           --           --           --           --          1.36*         --          1.18*         --          1.03           < > = values in this interval not displayed.                  Lab Test        01/07/19 01/06/19 01/05/19                       0727          0652          1559          NA           137          136          133           POTASSIUM    3.8          3.6          3.7           CHLORIDE     103          103          99            CO2          29           26           25            BUN          9            13           16            CR           0.83         0.77         0.79          ANIONGAP     5            7            10            BETTIE          8.0*         8.1*         8.6           GLC          221*         184*         346*                                   Physical Exam  Normal systems: cardiovascular and pulmonary    Airway   Mallampati: II    Dental   (+) chipped, loose and missing    Cardiovascular   Rhythm and rate: regular and normal      Pulmonary    breath sounds clear to auscultation            Lab Results   Component Value Date    WBC 5.6 01/07/2019    HGB 12.1 (L) 01/07/2019    HCT 35.8 (L) 01/07/2019     01/07/2019    CRP 81.6 (H) 09/08/2018    SED 15 09/08/2018     01/07/2019    POTASSIUM 3.8 01/07/2019    CHLORIDE 103 01/07/2019    CO2 29 01/07/2019    BUN 9 01/07/2019    CR 0.83 01/07/2019     (H) 01/07/2019    BETTIE 8.0 (L) 01/07/2019    ALBUMIN 3.2 (L) 01/05/2019    PROTTOTAL 6.8 01/05/2019    ALT 29 01/05/2019    AST 16 01/05/2019    ALKPHOS 82 01/05/2019    BILITOTAL 1.6 (H) 01/05/2019    LIPASE 23 11/02/2010    PTT 31 06/09/2017    INR 1.36 (H) 09/06/2018    TSH 0.40 09/06/2018       Preop Vitals  BP Readings from Last 3 Encounters:   01/08/19 147/87   09/18/18 135/87   09/17/18 (!) 167/97    Pulse Readings from Last 3 Encounters:   01/08/19 94   09/18/18 87  "  09/16/18 77      Resp Readings from Last 3 Encounters:   01/08/19 18   09/18/18 16   09/17/18 16    SpO2 Readings from Last 3 Encounters:   01/08/19 99%   09/18/18 96%   09/17/18 96%      Temp Readings from Last 1 Encounters:   01/08/19 97.7  F (36.5  C) (Temporal)    Ht Readings from Last 1 Encounters:   01/05/19 1.93 m (6' 4\")      Wt Readings from Last 1 Encounters:   01/05/19 113.4 kg (250 lb)    Estimated body mass index is 30.43 kg/m  as calculated from the following:    Height as of this encounter: 1.93 m (6' 4\").    Weight as of this encounter: 113.4 kg (250 lb).       Anesthesia Plan      History & Physical Review  History and physical reviewed and following examination; no interval change.    ASA Status:  3 .        Plan for General and ETT   PONV prophylaxis:  Ondansetron (or other 5HT-3)       Postoperative Care  Postoperative pain management:  IV analgesics, Oral pain medications and Multi-modal analgesia.      Consents                 Domingo Perez DO                    .  "

## 2019-01-08 NOTE — PROGRESS NOTES
[facility]  Infectious Disease Progress Note          Assessment and Plan:   IMPRESSION:   1.  A 68-year-old male with acute right diabetic foot infection including second toe osteomyelitis, cultures, primarily strep, amputation done at this point.  No major clinical sepsis.   2.  Prior episode in the same foot of bacteremia with strep and osteomyelitis in a third toe earlier this year.   3.  Diabetes mellitus with neuropathy.   4.  Chronic left first metatarsal ulcer, no clear deep infection.  Podiatry following.     5.  Prior cerebrovascular infarction.   6.  Coronary artery disease.   7.  Chronic atrial fibrillation.      RECOMMENDATIONS:   1.  Continue zosyn , discontinue vanco, await full culture information and adjust.   2.  On this occasion, assuming confident all major bone infection resected with no bacteremia, should be able to treat orally.     3.  Await op findings today                 Interval History:   no new complaints and doing well; no cp, sob, n/v/d, or abd pain. Pain Ok, no fever BC neg only strep foot cx              Medications:       amLODIPine  5 mg Oral Daily     insulin aspart  8 Units Subcutaneous TID w/meals     insulin aspart  1-10 Units Subcutaneous TID AC     insulin aspart  1-7 Units Subcutaneous At Bedtime     isosorbide mononitrate  30 mg Oral Daily     levothyroxine  137 mcg Oral At Bedtime     lisinopril  20 mg Oral BID     metoprolol succinate ER  50 mg Oral At Bedtime     pantoprazole  40 mg Oral QAM AC     piperacillin-tazobactam  3.375 g Intravenous Q6H     simvastatin  40 mg Oral At Bedtime     sodium chloride (PF)  10 mL Intracatheter Q8H     sodium chloride (PF)  10 mL Intracatheter Q8H     sodium chloride (PF)  3 mL Intracatheter Q8H     vancomycin (VANCOCIN) IV  2,000 mg Intravenous Q12H     vitamin D3  2,000 Units Oral QPM                  Physical Exam:   Blood pressure 137/86, pulse 77, temperature 96.4  F (35.8  C), temperature source Oral,  "resp. rate 18, height 1.93 m (6' 4\"), weight 113.4 kg (250 lb), SpO2 97 %.  Wt Readings from Last 2 Encounters:   01/05/19 113.4 kg (250 lb)   09/06/18 115.1 kg (253 lb 11.2 oz)     Vital Signs with Ranges  Temp:  [96.4  F (35.8  C)-98.5  F (36.9  C)] 96.4  F (35.8  C)  Pulse:  [73-77] 77  Heart Rate:  [73-77] 76  Resp:  [16-18] 18  BP: (137-154)/(86-98) 137/86  SpO2:  [94 %-97 %] 97 %    Constitutional: Awake, alert, cooperative, no apparent distress   Lungs: Clear to auscultation bilaterally, no crackles or wheezing   Cardiovascular: Regular rate and rhythm, normal S1 and S2, and no murmur noted   Abdomen: Normal bowel sounds, soft, non-distended, non-tender   Skin: No rashes, no cyanosis, same edema sl cellulitis, foot wrapped   Other:           Data:   All microbiology laboratory data reviewed.  Recent Labs   Lab Test 01/07/19  0727 01/06/19  0652 01/05/19  1559   WBC 5.6 6.9 7.1   HGB 12.1* 11.9* 13.2*   HCT 35.8* 36.0* 39.6*   MCV 87 87 87    182 182     Recent Labs   Lab Test 01/07/19  0727 01/06/19  0652 01/05/19  1559   CR 0.83 0.77 0.79     Recent Labs   Lab Test 09/08/18  0614   SED 15     Recent Labs   Lab Test 01/06/19  1436 01/05/19  1708 01/05/19  1558 09/09/18  1533 09/08/18  0613 09/06/18  1550 09/06/18  1545 09/06/18  1541 03/03/18  1936   CULT Heavy growth  Streptococcus dysgalactiae serogroup C/G  *  Culture in progress  Light growth  Streptococcus dysgalactiae serogroup C/G  Susceptibility testing done on previous specimen  *  Report finalized too soon.  Additonal final report to follow.  Culture negative monitoring continues  Culture negative monitoring continues No growth after 3 days No growth after 3 days No growth No growth Cultured on the 1st day of incubation:  Streptococcus agalactiae sero group B  *  Critical Value/Significant Value, preliminary result only, called to and read back by  Teresa John RN on [email or web address] @0015 on 9/7/18. ch.    (Note)  POSITIVE for STREPTOCOCCUS " AGALACTIAE (Group B Strep) by [employer]  multiplex nucleic acid test. Penicillin and ampicillin are drugs of  choice, nonsusceptible isolates have not been reported. Final  identification and antimicrobial susceptibility testing will be  verified by standard methods.    Specimen tested with Verigene multiplex, gram-positive blood culture  nucleic acid test for the following targets: Staph aureus, Staph  epidermidis, Staph lugdunensis, other Staph species, Enterococcus  faecalis, Enterococcus faecium, Streptococcus species, S. agalactiae,  S. anginosus grp., S. pneumoniae, S. pyogenes, Listeria sp., mecA  (methicillin resistance) and lEisha/B (vancomycin resistance).    Critical Value/Significant Value called to and read back by Teresa John RN on 9.7.18 at 1340. bw     Cultured on the 1st day of incubation:  Streptococcus agalactiae sero group B  Susceptibility testing done on previous specimen  *  Critical Value/Significant Value, preliminary result only, called to and read back by  Ofe Fernandes RN on [email or web address] @1515 on 9/7/18. ch.   Heavy growth  Staphylococcus aureus  *  Heavy growth  Beta hemolytic Streptococcus group B  *  Moderate growth  Normal skin michael    No growth No growth

## 2019-01-08 NOTE — PLAN OF CARE
Pt A&O, VSS on RA.  Denies pain.  Ace wrap to R foot CDI, redness on RLE marked and receding from marked borders.  Pt heel touch weight bear with orthotic shoe, SBA with gb and walker.  Midline placed today, zosyn and vanco administered per orders.  Voiding adequate amounts.    Will undergo second procedure Tuesday 1/8/19 at 1635 to close incision.  Will continue to monitor.

## 2019-01-08 NOTE — PLAN OF CARE
A&O. VSS. Afebrile. Denies pain. Dressing is intact. SBA. Heel touch WB. Redness to right leg within marked line. On IV Zosyn.

## 2019-01-08 NOTE — CONSULTS
Consult Date:  01/07/2019      INFECTIOUS DISEASES CONSULTATION      REFERRING PHYSICIAN: Dr. Rodriguez      IMPRESSION:   1.  A 68-year-old male with acute right diabetic foot infection including second toe osteomyelitis, cultures, primarily strep, amputation done at this point.  No major clinical sepsis.   2.  Prior episode in the same foot of bacteremia with strep and osteomyelitis in a third toe earlier this year.   3.  Diabetes mellitus with neuropathy.   4.  Chronic left first metatarsal ulcer, no clear deep infection.  Podiatry following.     5.  Prior cerebrovascular infarction.   6.  Coronary artery disease.   7.  Chronic atrial fibrillation.      RECOMMENDATIONS:   1.  Continue current broad antibiotics, await full culture information and adjust.   2.  On this occasion, assuming confident all major bone infection resected with no bacteremia, should be able to treat orally.     3.  Await full culture information to adjust to this.        HISTORY:  This is a 68-year-old male is seen in consultation due to right diabetic foot infection.  He is known to us, having seen him in September, at which time he had a significant right foot infection that required toe amputation at that time for osteomyelitis.  At that time, he had significant clinical sepsis and he had a strep bacteremia. He got a 2-week course of IV antibiotics and did well following this, he has continued to have some problems in his left plantar foot without clear infection, and the right foot had some minor issues, but then over the last week, had increasing problems without much in the way of clinical sepsis, but with worsening in his foot, particularly in the second toe.  He sought medical attention for his osteomyelitis in that toe and it has been amputated.  He feels okay postop, minor pain, no major clinical sepsis.  Blood cultures have been negative.      PAST MEDICAL HISTORY:  The prior episode of right foot infection, the chronic left  metatarsal ulcer without obvious deep infection, diabetes with neuropathy, prior cerebrovascular infarction, history of coronary disease and atrial fibrillation.      ALLERGIES:  NO ANTIMICROBIAL ALLERGIES.      MEDICATIONS:  As listed.      REVIEW OF SYSTEMS:  No major clinical sepsis and feels reasonably well.  Some discomfort in the foot.      PHYSICAL EXAMINATION:   GENERAL:  The patient appears his stated age. He does not look particularly toxic or ill.   VITAL SIGNS:  Currently normal, including being afebrile.   HEENT:  No thrush or oropharyngeal.  Pupils reactive.   NECK:  Supple and nontender without lymphadenopathy or thyromegaly.   HEART:  Slight irregularity, grade 1/6 systolic murmur.   LUNGS:  Clear bilaterally.   ABDOMEN:  Soft, nontender.   EXTREMITIES:  The left foot has a plantar eschar over his first metatarsal area, but not obvious infection.  Right foot wrapped.  No major proximal infection.      LABORATORY DATA:  Culture from this episode is again growing strep, which grew in his prior episode, the same group C and G strep growing.  Full culture information to follow.      Thank you very much for this consultation, will follow the patient.         ELIZA GARNICA MD             D: 2019   T: 2019   MT: CODY      Name:     PORTER YANCEY   MRN:      [id number]        Account:       [id number]   :      1950           Consult Date:  2019      Document: [id number]

## 2019-01-08 NOTE — PROGRESS NOTES
[facility]  Hospitalist Progress Note  Sabino Barbour MD 01/08/19      Reason for Stay (Diagnosis): diabetic foot infection         Assessment and Plan:      Summary of Stay: Jayro Elder is a 68 year old male with past medical history including insulin-dependent diabetes, known issues with prior diabetic foot infections, coronary artery disease with stents, cardiomyopathy, A. fib on Eliquis, untreated sleep apnea and stroke among others admitted on 1/5/2019 with necrotic appearing right second toe with surrounding foot and leg erythema.  He was admitted for broad-spectrum IV antibiotics and has been seen by podiatry with plans to take him to the operating room for right second toe amputation on 1/6 with findings compatible w/ gas gangrene and osteomyelitis.    Problem List/Assessment and Plan:   1. Gas-forming infection of right second toe s/p partial amputation on 1/6: Remains on broad-spectrum antibiotics  --Continue vancomycin and Zosyn  --Appreciate Podiatry guidance and management  --We will need to await culture results, potential need for repeat surgery.  --ID consult    2.   Uncontrolled insulin-dependent diabetes:   --Currently on 35 units of U300 insulin twice daily as well as sliding scale insulin.  Control improved after admission.  Blood sugars 400s in the ER.  --Restarted scheduled mealtime insulin at 6 units 3 times daily with meals in addition to sliding scale.  At home often uses up to 7-10 units.  Titrate up to 8 U today as he has been somewhat hyperglycemic.     3.   Atrial fibrillation on Eliquis: Had held Eliquis upon admission, will restart pending surgical plan.  It seems likely that he may need to return to the operating room so we will hold off today and reevaluate starting tomorrow.    4.   History of ischemic cardiomyopathy, coronary artery disease on Plavix.:  Stent reportedly was placed back in 2009.  -- hold Plavix for now pending surgical plan.  Likely can  "restart soon.  --resumed lisinopril 20 mg twice daily, Toprol-XL at bedtime, imdur and amlodipine daily  --EF 35-40% in September 2018.  Monitor for fluid overload.    5.   History of CVA      DVT Prophylaxis: Hold Eliquis pending surgical plan  Code Status: Full Code  Discharge Dispo/Date: At least 3 or 4 more days likely        Interval History (Subjective):      Leg pain better  Midline placed  Plan to go back to the operating room later today  No acute issues otherwise                  Physical Exam:      Last Vital Signs:  /89   Pulse 75   Temp 98.5  F (36.9  C) (Oral)   Resp 18   Ht 1.93 m (6' 4\")   Wt 113.4 kg (250 lb)   SpO2 94%   BMI 30.43 kg/m      I/O last 3 completed shifts:  In: 1686 [P.O.:240; I.V.:1446]  Out: -     General: Alert, awake, no acute distress.  HEENT: NC/AT, eyes anicteric, external occular movements intact, face symmetric.  Dentition WNL, MM moist.  Cardiac: RRR, S1, S2.  No murmurs appreciated.  Pulmonary: Normal chest rise, normal work of breathing.  Lungs CTA BL  Abdomen: soft, non-tender, non-distended.  Bowel Sounds Present.  No guarding.  Extremities: right foot dressed, erythema still present within outlined markings of LLE.  defer detailed exam to podiatry today.  He has an ulcer on the plantar aspect of his left foot as well but no surrounding erythema.  No deformities.  Warm, well perfused.  Skin: no rashes or lesions noted.  Warm and Dry.  Neuro: No focal deficits noted.  Speech clear.  Coordination and strength grossly normal.  Psych: Appropriate affect.         Medications:      All current medications were reviewed with changes reflected in problem list.         Data:      All new lab and imaging data was reviewed.   Labs:  Recent Labs   Lab 01/07/19  0727      POTASSIUM 3.8   CHLORIDE 103   CO2 29   ANIONGAP 5   *   BUN 9   CR 0.83   GFRESTIMATED 90   GFRESTBLACK >90   BETTIE 8.0*     Recent Labs   Lab 01/07/19  0727   WBC 5.6   HGB 12.1*   HCT 35.8* "   MCV 87         Imaging:   Recent Results (from the past 48 hour(s))   XR Toe Right G/E 2 Views    Narrative    TWO VIEWS OF THE TOES OF THE RIGHT FOOT   1/5/2019 3:31 PM    HISTORY: 2nd toe swelling, bruising, and cellulitis.    COMPARISON: None.      Impression    IMPRESSION: No fracture or osseous lesion is seen. There is prominent  hallux valgus with moderate to advanced degenerative changes of the  first metatarsophalangeal joint. No other significant joint space  pathology is seen. There is prominent soft tissue swelling surrounding  the distal phalanx of the second toe. Small areas of decreased  attenuation distally on the frontal view may represent gas or a soft  tissue defect. Clinical correlation is recommended. No underlying  cortical erosive changes are seen to suggest osteomyelitis. No other  abnormality is seen.    TALHA OTTO MD   US Lower Extremity Venous Duplex Right    Narrative    VENOUS ULTRASOUND RIGHT LOWER EXTREMITY  1/6/2019 11:35 AM     HISTORY: Right leg pain, warmth. Does have right foot infection,  though consider deep venous thrombosis.    COMPARISON: None.    FINDINGS:  Examination of the deep veins with graded compression and  color flow Doppler with spectral wave form analysis shows no evidence  of thrombus in the common femoral vein, femoral vein, popliteal vein  or calf veins.  There is no venous insufficiency.      Impression    IMPRESSION: No evidence of deep venous thrombosis.    MD Sabino PEREZ MD.

## 2019-01-08 NOTE — BRIEF OP NOTE
[facility]    Brief Operative Note    Pre-operative diagnosis: unknown  Post-operative diagnosis sp revision 2nd toe amp right foot with closure  Procedure: Procedure(s):  RIGHT 2ND TOE AMPUTATION  Surgeon: Surgeon(s) and Role:     * Ryan Bhagat DPM - Primary  Anesthesia: Monitor Anesthesia Care   Estimated blood loss: Minimal  Drains: None  Specimens:   ID Type Source Tests Collected by Time Destination   A : right 2nd toe Tissue Toe SURGICAL PATHOLOGY EXAM Ryan Bhagat DPM 1/8/2019  5:02 PM      Findings:   healthy tissue at base of wound; all bone infection resected, full closure, no further surgery anticipated.  Complications: None.  Implants: None.

## 2019-01-09 ENCOUNTER — APPOINTMENT (OUTPATIENT)
Dept: PHYSICAL THERAPY | Facility: CLINIC | Age: 69
DRG: 255 | End: 2019-01-09
Payer: COMMERCIAL

## 2019-01-09 LAB
ALBUMIN UR-MCNC: 30 MG/DL
ANION GAP SERPL CALCULATED.3IONS-SCNC: 8 MMOL/L (ref 3–14)
APPEARANCE UR: CLEAR
BACTERIA SPEC CULT: ABNORMAL
BACTERIA SPEC CULT: ABNORMAL
BASOPHILS # BLD AUTO: 0 10E9/L (ref 0–0.2)
BASOPHILS NFR BLD AUTO: 0.4 %
BILIRUB UR QL STRIP: NEGATIVE
BUN SERPL-MCNC: 12 MG/DL (ref 7–30)
CALCIUM SERPL-MCNC: 8.8 MG/DL (ref 8.5–10.1)
CHLORIDE SERPL-SCNC: 104 MMOL/L (ref 94–109)
CO2 SERPL-SCNC: 27 MMOL/L (ref 20–32)
COLOR UR AUTO: YELLOW
COPATH REPORT: NORMAL
CREAT SERPL-MCNC: 2.03 MG/DL (ref 0.66–1.25)
DIFFERENTIAL METHOD BLD: ABNORMAL
EOSINOPHIL # BLD AUTO: 0.1 10E9/L (ref 0–0.7)
EOSINOPHIL NFR BLD AUTO: 1.3 %
ERYTHROCYTE [DISTWIDTH] IN BLOOD BY AUTOMATED COUNT: 13.1 % (ref 10–15)
GFR SERPL CREATININE-BSD FRML MDRD: 33 ML/MIN/{1.73_M2}
GLUCOSE BLDC GLUCOMTR-MCNC: 167 MG/DL (ref 70–99)
GLUCOSE BLDC GLUCOMTR-MCNC: 178 MG/DL (ref 70–99)
GLUCOSE BLDC GLUCOMTR-MCNC: 180 MG/DL (ref 70–99)
GLUCOSE BLDC GLUCOMTR-MCNC: 225 MG/DL (ref 70–99)
GLUCOSE BLDC GLUCOMTR-MCNC: 241 MG/DL (ref 70–99)
GLUCOSE BLDC GLUCOMTR-MCNC: 256 MG/DL (ref 70–99)
GLUCOSE SERPL-MCNC: 186 MG/DL (ref 70–99)
GLUCOSE UR STRIP-MCNC: 150 MG/DL
HCT VFR BLD AUTO: 37.1 % (ref 40–53)
HGB BLD-MCNC: 12.4 G/DL (ref 13.3–17.7)
HGB UR QL STRIP: NEGATIVE
IMM GRANULOCYTES # BLD: 0 10E9/L (ref 0–0.4)
IMM GRANULOCYTES NFR BLD: 0.3 %
KETONES UR STRIP-MCNC: NEGATIVE MG/DL
LEUKOCYTE ESTERASE UR QL STRIP: NEGATIVE
LYMPHOCYTES # BLD AUTO: 1.2 10E9/L (ref 0.8–5.3)
LYMPHOCYTES NFR BLD AUTO: 14.9 %
Lab: ABNORMAL
MCH RBC QN AUTO: 28.9 PG (ref 26.5–33)
MCHC RBC AUTO-ENTMCNC: 33.4 G/DL (ref 31.5–36.5)
MCV RBC AUTO: 87 FL (ref 78–100)
MONOCYTES # BLD AUTO: 0.8 10E9/L (ref 0–1.3)
MONOCYTES NFR BLD AUTO: 10.5 %
MUCOUS THREADS #/AREA URNS LPF: PRESENT /LPF
NEUTROPHILS # BLD AUTO: 5.8 10E9/L (ref 1.6–8.3)
NEUTROPHILS NFR BLD AUTO: 72.6 %
NITRATE UR QL: NEGATIVE
NRBC # BLD AUTO: 0 10*3/UL
NRBC BLD AUTO-RTO: 0 /100
PH UR STRIP: 5 PH (ref 5–7)
PLATELET # BLD AUTO: 246 10E9/L (ref 150–450)
POTASSIUM SERPL-SCNC: 3.8 MMOL/L (ref 3.4–5.3)
RBC # BLD AUTO: 4.29 10E12/L (ref 4.4–5.9)
RBC #/AREA URNS AUTO: 1 /HPF (ref 0–2)
SODIUM SERPL-SCNC: 139 MMOL/L (ref 133–144)
SOURCE: ABNORMAL
SP GR UR STRIP: 1.01 (ref 1–1.03)
SPECIMEN SOURCE: ABNORMAL
UROBILINOGEN UR STRIP-MCNC: 0 MG/DL (ref 0–2)
WBC # BLD AUTO: 7.9 10E9/L (ref 4–11)
WBC #/AREA URNS AUTO: 2 /HPF (ref 0–5)

## 2019-01-09 PROCEDURE — 40000193 ZZH STATISTIC PT WARD VISIT: Performed by: PHYSICAL THERAPIST

## 2019-01-09 PROCEDURE — 25000128 H RX IP 250 OP 636: Performed by: INTERNAL MEDICINE

## 2019-01-09 PROCEDURE — 81001 URINALYSIS AUTO W/SCOPE: CPT | Performed by: INTERNAL MEDICINE

## 2019-01-09 PROCEDURE — 00000146 ZZHCL STATISTIC GLUCOSE BY METER IP

## 2019-01-09 PROCEDURE — 12000000 ZZH R&B MED SURG/OB

## 2019-01-09 PROCEDURE — 36415 COLL VENOUS BLD VENIPUNCTURE: CPT | Performed by: INTERNAL MEDICINE

## 2019-01-09 PROCEDURE — 97530 THERAPEUTIC ACTIVITIES: CPT | Mod: GP | Performed by: PHYSICAL THERAPIST

## 2019-01-09 PROCEDURE — 85025 COMPLETE CBC W/AUTO DIFF WBC: CPT | Performed by: INTERNAL MEDICINE

## 2019-01-09 PROCEDURE — 25000132 ZZH RX MED GY IP 250 OP 250 PS 637: Performed by: INTERNAL MEDICINE

## 2019-01-09 PROCEDURE — 80048 BASIC METABOLIC PNL TOTAL CA: CPT | Performed by: INTERNAL MEDICINE

## 2019-01-09 PROCEDURE — 99233 SBSQ HOSP IP/OBS HIGH 50: CPT | Performed by: INTERNAL MEDICINE

## 2019-01-09 RX ORDER — SODIUM CHLORIDE 9 MG/ML
INJECTION, SOLUTION INTRAVENOUS CONTINUOUS
Status: ACTIVE | OUTPATIENT
Start: 2019-01-09 | End: 2019-01-10

## 2019-01-09 RX ORDER — CEFTRIAXONE 2 G/1
2 INJECTION, POWDER, FOR SOLUTION INTRAMUSCULAR; INTRAVENOUS EVERY 24 HOURS
Status: DISCONTINUED | OUTPATIENT
Start: 2019-01-09 | End: 2019-01-14 | Stop reason: HOSPADM

## 2019-01-09 RX ADMIN — STANDARDIZED SENNA CONCENTRATE AND DOCUSATE SODIUM 2 TABLET: 8.6; 5 TABLET, FILM COATED ORAL at 21:51

## 2019-01-09 RX ADMIN — LISINOPRIL 20 MG: 20 TABLET ORAL at 10:00

## 2019-01-09 RX ADMIN — APIXABAN 5 MG: 5 TABLET, FILM COATED ORAL at 11:49

## 2019-01-09 RX ADMIN — STANDARDIZED SENNA CONCENTRATE AND DOCUSATE SODIUM 1 TABLET: 8.6; 5 TABLET, FILM COATED ORAL at 11:49

## 2019-01-09 RX ADMIN — TAZOBACTAM SODIUM AND PIPERACILLIN SODIUM 3.38 G: 375; 3 INJECTION, SOLUTION INTRAVENOUS at 07:06

## 2019-01-09 RX ADMIN — METOPROLOL SUCCINATE 50 MG: 50 TABLET, EXTENDED RELEASE ORAL at 21:50

## 2019-01-09 RX ADMIN — LEVOTHYROXINE SODIUM 137 MCG: 137 TABLET ORAL at 21:50

## 2019-01-09 RX ADMIN — INSULIN ASPART 2 UNITS: 100 INJECTION, SOLUTION INTRAVENOUS; SUBCUTANEOUS at 12:16

## 2019-01-09 RX ADMIN — SIMVASTATIN 40 MG: 40 TABLET, FILM COATED ORAL at 21:51

## 2019-01-09 RX ADMIN — PANTOPRAZOLE SODIUM 40 MG: 40 TABLET, DELAYED RELEASE ORAL at 07:03

## 2019-01-09 RX ADMIN — ONDANSETRON 4 MG: 2 INJECTION INTRAMUSCULAR; INTRAVENOUS at 00:54

## 2019-01-09 RX ADMIN — CEFTRIAXONE SODIUM 2 G: 2 INJECTION, POWDER, FOR SOLUTION INTRAMUSCULAR; INTRAVENOUS at 10:09

## 2019-01-09 RX ADMIN — VITAMIN D, TAB 1000IU (100/BT) 2000 UNITS: 25 TAB at 20:27

## 2019-01-09 RX ADMIN — SODIUM CHLORIDE: 9 INJECTION, SOLUTION INTRAVENOUS at 18:59

## 2019-01-09 RX ADMIN — ISOSORBIDE MONONITRATE 30 MG: 30 TABLET, EXTENDED RELEASE ORAL at 09:59

## 2019-01-09 RX ADMIN — APIXABAN 5 MG: 5 TABLET, FILM COATED ORAL at 20:27

## 2019-01-09 RX ADMIN — INSULIN ASPART 5 UNITS: 100 INJECTION, SOLUTION INTRAVENOUS; SUBCUTANEOUS at 17:42

## 2019-01-09 RX ADMIN — SODIUM CHLORIDE: 9 INJECTION, SOLUTION INTRAVENOUS at 14:42

## 2019-01-09 RX ADMIN — TAZOBACTAM SODIUM AND PIPERACILLIN SODIUM 3.38 G: 375; 3 INJECTION, SOLUTION INTRAVENOUS at 00:45

## 2019-01-09 RX ADMIN — AMLODIPINE BESYLATE 5 MG: 5 TABLET ORAL at 10:00

## 2019-01-09 ASSESSMENT — ACTIVITIES OF DAILY LIVING (ADL)
ADLS_ACUITY_SCORE: 12
ADLS_ACUITY_SCORE: 13
ADLS_ACUITY_SCORE: 12

## 2019-01-09 NOTE — PLAN OF CARE
Discharge Planner PT     Patient plan for discharge: Home with wife  Current status: Approached pt for PT session. No mobility changes since most recent trip to OR. Pt denies any concerns regarding mobility at home, is aware that he has difficulty maintaining his heel WB status and knows to limit ambulation to functional household distances 2/2 to this. Pt has all equipment needed for safe mobility at home. No further skilled PT needs identified at this time, will complete orders.   Barriers to return to prior living situation; none  Recommendations for discharge: Home with wife  Rationale for recommendations: Pt I with all bed mobility and S/SBA with transfers and gait.  No continued PT necessary after discharge as pt should allow surgical wound to heel and limit gait due to poor adherence to R heel WB in ortho shoe.         Entered by: Angela Taylor 01/09/2019 11:32 AM        Physical Therapy Discharge Summary    Reason for therapy discharge:    All goals and outcomes met, no further needs identified.    Progress towards therapy goal(s). See goals on Care Plan in [facility] electronic health record for goal details.  Goals met    Therapy recommendation(s):    No further therapy is recommended.

## 2019-01-09 NOTE — PROGRESS NOTES
[facility]  Infectious Disease Progress Note          Assessment and Plan:   IMPRESSION:   1.  A 68-year-old male with acute right diabetic foot infection including second toe osteomyelitis, cultures, primarily strep, amputation done at this point.  No major clinical sepsis.   2.  Prior episode in the same foot of bacteremia with strep and osteomyelitis in a third toe earlier this year.   3.  Diabetes mellitus with neuropathy.   4.  Chronic left first metatarsal ulcer, no clear deep infection.  Podiatry following.     5.  Prior cerebrovascular infarction.   6.  Coronary artery disease.   7.  Chronic atrial fibrillation.   8 Acute renal failure 1/9, ? vanc0     RECOMMENDATIONS:   1.  Only strep and now renal failure to ceftriaxone  2.  On this occasion, assuming confident all major bone infection resected with no bacteremia, should be able to treat orally.     3.  Latest op noted, await path, follow foot  4 Creat to 2, is urinating follow closely               Interval History:   no new complaints and doing well; no cp, sob, n/v/d, or abd pain. Pain Ok, no fever BC neg only strep foot cx  Creat 2              Medications:       amLODIPine  5 mg Oral Daily     cefTRIAXone  2 g Intravenous Q24H     insulin aspart  8 Units Subcutaneous TID w/meals     insulin aspart  1-10 Units Subcutaneous TID AC     insulin aspart  1-7 Units Subcutaneous At Bedtime     isosorbide mononitrate  30 mg Oral Daily     levothyroxine  137 mcg Oral At Bedtime     lisinopril  20 mg Oral BID     metoprolol succinate ER  50 mg Oral At Bedtime     pantoprazole  40 mg Oral QAM AC     simvastatin  40 mg Oral At Bedtime     sodium chloride (PF)  10 mL Intracatheter Q8H     sodium chloride (PF)  10 mL Intracatheter Q8H     sodium chloride (PF)  3 mL Intracatheter Q8H     sodium chloride (PF)  3 mL Intracatheter Q8H     vitamin D3  2,000 Units Oral QPM                  Physical Exam:   Blood pressure 159/87, pulse 83,  "temperature 99.1  F (37.3  C), temperature source Oral, resp. rate 18, height 1.93 m (6' 4\"), weight 113.4 kg (250 lb), SpO2 94 %.  Wt Readings from Last 2 Encounters:   01/05/19 113.4 kg (250 lb)   09/06/18 115.1 kg (253 lb 11.2 oz)     Vital Signs with Ranges  Temp:  [96.4  F (35.8  C)-99.1  F (37.3  C)] 99.1  F (37.3  C)  Pulse:  [72-94] 83  Heart Rate:  [70-92] 88  Resp:  [10-50] 18  BP: (137-169)/() 159/87  FiO2 (%):  [100 %] 100 %  SpO2:  [93 %-100 %] 94 %    Constitutional: Awake, alert, cooperative, no apparent distress   Lungs: Clear to auscultation bilaterally, no crackles or wheezing   Cardiovascular: Regular rate and rhythm, normal S1 and S2, and no murmur noted   Abdomen: Normal bowel sounds, soft, non-distended, non-tender   Skin: No rashes, no cyanosis, same edema sl cellulitis, foot wrapped   Other:           Data:   All microbiology laboratory data reviewed.  Recent Labs   Lab Test 01/09/19  0722 01/07/19  0727 01/06/19  0652   WBC 7.9 5.6 6.9   HGB 12.4* 12.1* 11.9*   HCT 37.1* 35.8* 36.0*   MCV 87 87 87    189 182     Recent Labs   Lab Test 01/09/19  0722 01/07/19  0727 01/06/19  0652   CR 2.03* 0.83 0.77     Recent Labs   Lab Test 09/08/18  0614   SED 15     Recent Labs   Lab Test 01/06/19  1436 01/05/19  1708 01/05/19  1558 09/09/18  1533 09/08/18  0613 09/06/18  1550 09/06/18  1545 09/06/18  1541 03/03/18  1936   CULT Heavy growth  Streptococcus dysgalactiae serogroup C/G  *  Culture in progress  Light growth  Streptococcus dysgalactiae serogroup C/G  Susceptibility testing done on previous specimen  *  Report finalized too soon.  Additonal final report to follow.  Culture negative monitoring continues  Culture negative monitoring continues No growth after 4 days No growth after 4 days No growth No growth Cultured on the 1st day of incubation:  Streptococcus agalactiae sero group B  *  Critical Value/Significant Value, preliminary result only, called to and read back " by  Teresa John RN on [email or web address] @1055 on 9/7/18. ch.    (Note)  POSITIVE for STREPTOCOCCUS AGALACTIAE (Group B Strep) by [employer]  multiplex nucleic acid test. Penicillin and ampicillin are drugs of  choice, nonsusceptible isolates have not been reported. Final  identification and antimicrobial susceptibility testing will be  verified by standard methods.    Specimen tested with Verigene multiplex, gram-positive blood culture  nucleic acid test for the following targets: Staph aureus, Staph  epidermidis, Staph lugdunensis, other Staph species, Enterococcus  faecalis, Enterococcus faecium, Streptococcus species, S. agalactiae,  S. anginosus grp., S. pneumoniae, S. pyogenes, Listeria sp., mecA  (methicillin resistance) and Elisha/B (vancomycin resistance).    Critical Value/Significant Value called to and read back by Teresa John RN on 9.7.18 at 1340. bw     Cultured on the 1st day of incubation:  Streptococcus agalactiae sero group B  Susceptibility testing done on previous specimen  *  Critical Value/Significant Value, preliminary result only, called to and read back by  Ofe Fernandes RN on [email or web address] @1515 on 9/7/18. ch.   Heavy growth  Staphylococcus aureus  *  Heavy growth  Beta hemolytic Streptococcus group B  *  Moderate growth  Normal skin michael    No growth No growth

## 2019-01-09 NOTE — PROGRESS NOTES
[facility]  Hospitalist Progress Note  Joselito Jeter MD 01/09/2019    Reason for Stay (Diagnosis): diabetic ft infection         Assessment and Plan:      Summary of Stay: Jayro Elder is a 68 year old male with past medical history including insulin-dependent diabetes, known issues with prior diabetic foot infections, coronary artery disease with stents, cardiomyopathy, A. fib on Eliquis, untreated sleep apnea and stroke among others admitted on 1/5/2019 with necrotic appearing right second toe with surrounding foot and leg erythema.  He was admitted for broad-spectrum IV antibiotics and has been seen by podiatry and was taken to the OR twice this admission for amputation.    Surgical cx growing strep species.  Path from surgery pending    Course now complicated by ARF, possibly from vancomycin.  vanco stopped and changed to Rocephin     Problem List/Assessment and Plan:   1. Gas-forming infection of right second toe s/p partial amputation:  Had surgery 1/6 and 1/8; podiatry does not anticipate any further surgery.  antibiotics changed to Rocephin as surgical cx growing strep species  --Appreciate Podiatry and ID guidance and management    2.  ARF:  Creatinine was normal; noted to be elevated to 2 on lab draw 1/9.  Concern that this may be related to Vanco.  vanco stopped.  Will check UA and repeat BMP in AM.  Gentle IVF overnight  - will hold lisinopril for now       2.   Uncontrolled insulin-dependent diabetes:   --at home he is on 35 units of U300 insulin twice daily - this is on hold with currently adequate control of BS using Novolog with meals and ISS prn.  Continue novolog 8 unit(s) with meals.      3.   Atrial fibrillation on Eliquis: Had held Eliquis upon admission, will restart now that surgeries completed.       4.   History of ischemic cardiomyopathy, coronary artery disease on Plavix.:  Stent reportedly was placed back in 2009.  -- likely can resume Plavix tomorrow if no  "bleeding with resumption of NOAC today  --resumed Toprol-XL at bedtime, imdur and amlodipine daily  --EF 35-40% in September 2018.  Monitor for fluid overload.     5.   History of CVA        DVT Prophylaxis: NOAC  Code Status: Full Code  Discharge Dispo/Date: 2-3 days likely; need to see renal failure improve first            Interval History (Subjective):      No complaints.  Pain controlled.  Making urine                  Physical Exam:      Last Vital Signs:  BP (!) 154/95   Pulse 83   Temp 97.9  F (36.6  C) (Oral)   Resp 16   Ht 1.93 m (6' 4\")   Wt 113.4 kg (250 lb)   SpO2 95%   BMI 30.43 kg/m        Intake/Output Summary (Last 24 hours) at 1/9/2019 1403  Last data filed at 1/9/2019 1011  Gross per 24 hour   Intake 1733 ml   Output 350 ml   Net 1383 ml       Constitutional: Awake, alert, cooperative, no apparent distress   Respiratory: Clear to auscultation bilaterally, no crackles or wheezing   Cardiovascular: Regular rate and rhythm, normal S1 and S2, and no murmur noted   Abdomen: Normal bowel sounds, soft, non-distended, non-tender   Skin: No rashes, no cyanosis, dry to touch   Neuro: Alert and oriented x3, no weakness, numbness, memory loss   Extremities: No edema, normal range of motion   Other(s): Operative ft wrapped in dressing       All other systems: Negative          Medications:      All current medications were reviewed with changes reflected in problem list.         Data:      All new lab and imaging data was reviewed.   Labs:  Recent Labs   Lab 01/09/19  0722   WBC 7.9   HGB 12.4*   HCT 37.1*   MCV 87         Imaging:   Recent Results (from the past 24 hour(s))   XR Toe Right G/E 2 Views    Narrative    RIGHT TOE(S) TWO TO THREE VIEWS  1/8/2019 6:21 PM     HISTORY: Status post revision second toe amputation.     COMPARISON: January 5, 2019      Impression    IMPRESSION: Amputation changes of the second digit, degenerative  changes otherwise in the foot. No acute osseous " abnormality  demonstrated.    YE GAONA MD

## 2019-01-09 NOTE — PLAN OF CARE
"  Nursing cares between [id number]  [place]: A&O  CMS: Moderate dorsi/plantar flexion. Baseline numbness/tingling in hands and feet.  VS: BP (!) 158/91 (BP Location: Left arm)   Pulse 83   Temp 97.2  F (36.2  C) (Oral)   Resp 16   Ht 1.93 m (6' 4\")   Wt 113.4 kg (250 lb)   SpO2 95%   BMI 30.43 kg/m    LS: clear on RA  GI: active, last BM before surg, +gas  : BR  Skin: right foot incision, CDI.  Activity: SBA   Diet: mod carb   Pain: denies  Lab: , 178, 241. Creatinine 2.03. UA neg.  Plan: Possible discharge tmrrw, depending on lab work. Podiatry signed off, Dr. Root changed dressing, new dressing applied: would like another dressing change prior to pt leaving, order in. IVF overnight. Rocephin. Care coordinator. PT signed off.           "

## 2019-01-09 NOTE — ANESTHESIA POSTPROCEDURE EVALUATION
Patient: Jayro Elder    Procedure(s):  RIGHT 2ND TOE AMPUTATION    Diagnosis:unknown  Diagnosis Additional Information: Pre-operative diagnosis:         unknown  Post-operative diagnosis        sp revision 2nd toe amp right foot with closure  Procedure:      Procedure(s):  RIGHT 2ND TOE AMPUTATION        Anesthesia Type:  General, ETT    Note:  Anesthesia Post Evaluation    Patient location during evaluation: PACU  Patient participation: Able to fully participate in evaluation  Level of consciousness: awake and alert  Pain management: adequate  Airway patency: patent  Cardiovascular status: acceptable  Respiratory status: acceptable  Hydration status: acceptable  PONV: none     Anesthetic complications: None          Last vitals:  Vitals:    01/08/19 2013 01/08/19 2045 01/08/19 2131   BP: (!) 164/95 154/88 (!) 158/96   Pulse:      Resp: 16 16    Temp: 97  F (36.1  C)     SpO2: 97% 96%          Electronically Signed By: Sabino Hanna MD  January 8, 2019  10:29 PM

## 2019-01-09 NOTE — PLAN OF CARE
"Vitals: /87 (BP Location: Left arm)   Pulse 83   Temp 99.1  F (37.3  C) (Oral)   Resp 18   Ht 1.93 m (6' 4\")   Wt 113.4 kg (250 lb)   SpO2 94%   BMI 30.43 kg/m    Situation/Status: Post op day  1-revision 2nd toe amp right foot with closure, ace wrap over, wears boot when walks.  Neuro: A/O x 4  Pain: Denied  Activity: Assist of 1 w/ gait belt, heel wt bear  Diet: Reg diet  Lungs: [id number]  GI/: No nausea/vomiting, + Flautus, BS x4.  Skin: CMS intact, Ace bandage on right foot.  Dressings: CDI  Lines/drains: IV- SL, ABX -Vanco and Zosyn. Has Midline Power PICC  Labs:  and 180  Plan: Continue to monitor per POC.    "

## 2019-01-09 NOTE — OP NOTE
Procedure Date: 01/08/2019      SURGEON:  Ryan Bhagat DPM      PREOPERATIVE DIAGNOSES:     1.  Necrotic right second wound, status post open guillotine amputation by Dr. Garcia 01/06/2019.   2.  Poorly controlled diabetes mellitus with peripheral neuropathy.      POSTOPERATIVE DIAGNOSES:   1.  Necrotic right second wound, status post open guillotine amputation by Dr. Garcia 01/06/2019.   2.  Poorly controlled diabetes mellitus with peripheral neuropathy.      PROCEDURES:     1.  Revision right second toe amputation with resection of distal half of proximal phalanx with full closure.     2.  Debridement of callus/preulcerative lesion, distal left second toe and plantar left first metatarsophalangeal joint.      PATHOLOGY:  Toe sent off for gross evaluation.      ANESTHESIA:  MAC with local.      HEMOSTASIS:  None.      ESTIMATED BLOOD LOSS:  2 mL.        MATERIALS:  None.        INJECTABLES:  8 mL of 0.25% bupivacaine plain preoperatively.      COMPLICATIONS:  None apparent.      INDICATIONS FOR PROCEDURE:  The patient is a pleasant 68-year-old neuropathic diabetic male who has significantly gangrenous distal right second toe with gas gangrene.  He underwent open guillotine amputation by Dr. Garcia on 01/06/2019 and was brought back to the operating room today for revision surgery.  A postoperative MRI showed likely osteomyelitis in the head of the proximal phalanx.  He was consented for a flexor tenotomy in the left second toe because he had a partially reducible hammertoe with a preulcerative lesion distally.  While he underwent debridement of the callus/preulcerative lesion at the distal aspect of the left second toe and plantar aspect of the left first metatarsophalangeal joint, he elected to not proceed with a flexor tenotomy.       DESCRIPTION OF PROCEDURE:  After obtaining written consent, the patient was transferred to the operating room, placed in the supine position on the operating room  table.  Prior to anesthesia and prep, the preulcerative wound/callus at the distal left second toe and the plantar left first metatarsophalangeal joint were sharply debrided with a 15 blade back to healthy underlying tissue.  There was intralesional hemorrhaging, but no open wound was noted.      IV sedation was initiated.  The right second toe was anesthetized with preoperative local.  He was then prepped and draped in normal aseptic fashion.  No tourniquet was utilized.  The patient, procedure and site were correctly identified by OR staff.      Attention was directed to the right second toe open amputation site.  A fishmouth incision was carried down to underlying bone with apices dorsal and plantar.  The distal half of the remaining proximal phalanx was resected using a sagittal saw and this was sent off for gross evaluation.  The base of the wound was healthy viable tissue without evidence of necrosis or deep infection.  After bleeding vessels were electrocauterized as necessary, the wound was flushed with copious amounts of normal saline.  Single layer closure was performed with 3-0 nylon.      A dry sterile dressing was applied to the patient's right foot.  He appeared to tolerate the procedure and anesthesia well and transferred back with vital signs stable and vascular status intact to remaining digits of the foot.  The patient will be heel readmitted to the floor for IV antibiotics and monitoring.         CONCHITA LOO DPM             D: 2019   T: 2019   MT: NANCY      Name:     PORTER YANCEY   MRN:      [id number]        Account:        [id number]   :      1950           Procedure Date: 2019      Document: [id number]

## 2019-01-09 NOTE — PROGRESS NOTES
[facility]    No complaints.    Exam:  B/P: 158/91, T: 97.2, P: 83, R: 16  Right surgical site stable  Sutures intact  Radha-incisional erythema is localized.   No ischemic changes.  Post op XR show very defined resection of bone.    1/6 Cultures:  Aerobic:  Strep dysgalactaie; staph lugdunensis  Anaerobic: negative to date  Bone culture:  Strep dysgalactiae; coag neg staph;     1/6 Surgical Path:  SPECIMEN(S):   Amputation, right second toe     FINAL DIAGNOSIS:   Toe, right, second toe partial amputation-   - Skin and soft tissue necrosis with abscess formation.   - Negative for diagnostic osteomyelitis within planes examined.   - Viable resection margins.   - Negative for malignancy.     Electronically signed out by:     Naila Arrington M.D.     1/8 Surgical Path is pending    Assessment:  68-year old male with type 2 DM, peripheral neuropathy status post open amputation of the distal right second toe 1/6/19 and revision amputation at mid proximal phalanx with closure 1/9/19 for treatment of gas gangrene.  Stable.    ARF    Plan:  Sterile re-dress of right foot.   Discharge orders were placed by Dr. Bhagat yesterday  We will monitor cultures as an outpatient  PO antibiotic on discharge per ID; all bone infection should be removed.  Podiatry will sign off. Please page the on-call podiatrist if there are questions.  I have placed an order for the RN to do a dressing change on day of discharge. This can be left on until follow up in clinic.     Kwasi Root, BRICE, FACFAS, MS    [place]

## 2019-01-10 LAB
ANION GAP SERPL CALCULATED.3IONS-SCNC: 9 MMOL/L (ref 3–14)
ANION GAP SERPL CALCULATED.3IONS-SCNC: 9 MMOL/L (ref 3–14)
BACTERIA SPEC CULT: ABNORMAL
BACTERIA SPEC CULT: ABNORMAL
BUN SERPL-MCNC: 18 MG/DL (ref 7–30)
BUN SERPL-MCNC: 18 MG/DL (ref 7–30)
CALCIUM SERPL-MCNC: 8.3 MG/DL (ref 8.5–10.1)
CALCIUM SERPL-MCNC: 8.4 MG/DL (ref 8.5–10.1)
CHLORIDE SERPL-SCNC: 103 MMOL/L (ref 94–109)
CHLORIDE SERPL-SCNC: 104 MMOL/L (ref 94–109)
CO2 SERPL-SCNC: 25 MMOL/L (ref 20–32)
CO2 SERPL-SCNC: 26 MMOL/L (ref 20–32)
CREAT SERPL-MCNC: 3 MG/DL (ref 0.66–1.25)
CREAT SERPL-MCNC: 3.18 MG/DL (ref 0.66–1.25)
CREAT UR-MCNC: 60 MG/DL
FRACT EXCRET NA UR+SERPL-RTO: 1.2 %
GFR SERPL CREATININE-BSD FRML MDRD: 19 ML/MIN/{1.73_M2}
GFR SERPL CREATININE-BSD FRML MDRD: 20 ML/MIN/{1.73_M2}
GLUCOSE BLDC GLUCOMTR-MCNC: 171 MG/DL (ref 70–99)
GLUCOSE BLDC GLUCOMTR-MCNC: 200 MG/DL (ref 70–99)
GLUCOSE BLDC GLUCOMTR-MCNC: 205 MG/DL (ref 70–99)
GLUCOSE BLDC GLUCOMTR-MCNC: 214 MG/DL (ref 70–99)
GLUCOSE BLDC GLUCOMTR-MCNC: 226 MG/DL (ref 70–99)
GLUCOSE SERPL-MCNC: 176 MG/DL (ref 70–99)
GLUCOSE SERPL-MCNC: 218 MG/DL (ref 70–99)
OSMOLALITY UR: 157 MMOL/KG (ref 100–1200)
POTASSIUM SERPL-SCNC: 3.6 MMOL/L (ref 3.4–5.3)
POTASSIUM SERPL-SCNC: 3.8 MMOL/L (ref 3.4–5.3)
SODIUM SERPL-SCNC: 138 MMOL/L (ref 133–144)
SODIUM SERPL-SCNC: 138 MMOL/L (ref 133–144)
SODIUM UR-SCNC: 32 MMOL/L
SPECIMEN SOURCE: ABNORMAL
TROPONIN I SERPL-MCNC: 0.02 UG/L (ref 0–0.04)
TROPONIN I SERPL-MCNC: 0.02 UG/L (ref 0–0.04)

## 2019-01-10 PROCEDURE — 25000128 H RX IP 250 OP 636: Performed by: INTERNAL MEDICINE

## 2019-01-10 PROCEDURE — 36415 COLL VENOUS BLD VENIPUNCTURE: CPT | Performed by: INTERNAL MEDICINE

## 2019-01-10 PROCEDURE — 80048 BASIC METABOLIC PNL TOTAL CA: CPT | Performed by: INTERNAL MEDICINE

## 2019-01-10 PROCEDURE — 84484 ASSAY OF TROPONIN QUANT: CPT | Performed by: INTERNAL MEDICINE

## 2019-01-10 PROCEDURE — 82570 ASSAY OF URINE CREATININE: CPT | Performed by: INTERNAL MEDICINE

## 2019-01-10 PROCEDURE — 84300 ASSAY OF URINE SODIUM: CPT | Performed by: INTERNAL MEDICINE

## 2019-01-10 PROCEDURE — 25000132 ZZH RX MED GY IP 250 OP 250 PS 637: Performed by: INTERNAL MEDICINE

## 2019-01-10 PROCEDURE — 00000146 ZZHCL STATISTIC GLUCOSE BY METER IP

## 2019-01-10 PROCEDURE — 25000131 ZZH RX MED GY IP 250 OP 636 PS 637: Performed by: INTERNAL MEDICINE

## 2019-01-10 PROCEDURE — 99233 SBSQ HOSP IP/OBS HIGH 50: CPT | Performed by: INTERNAL MEDICINE

## 2019-01-10 PROCEDURE — 12000000 ZZH R&B MED SURG/OB

## 2019-01-10 PROCEDURE — 83935 ASSAY OF URINE OSMOLALITY: CPT | Performed by: INTERNAL MEDICINE

## 2019-01-10 RX ADMIN — METOPROLOL SUCCINATE 50 MG: 50 TABLET, EXTENDED RELEASE ORAL at 22:04

## 2019-01-10 RX ADMIN — VITAMIN D, TAB 1000IU (100/BT) 2000 UNITS: 25 TAB at 19:59

## 2019-01-10 RX ADMIN — PANTOPRAZOLE SODIUM 40 MG: 40 TABLET, DELAYED RELEASE ORAL at 08:25

## 2019-01-10 RX ADMIN — APIXABAN 5 MG: 5 TABLET, FILM COATED ORAL at 08:25

## 2019-01-10 RX ADMIN — ACETAMINOPHEN 650 MG: 325 TABLET, FILM COATED ORAL at 20:02

## 2019-01-10 RX ADMIN — SODIUM CHLORIDE 1000 ML: 9 INJECTION, SOLUTION INTRAVENOUS at 10:23

## 2019-01-10 RX ADMIN — AMLODIPINE BESYLATE 5 MG: 5 TABLET ORAL at 08:25

## 2019-01-10 RX ADMIN — ISOSORBIDE MONONITRATE 30 MG: 30 TABLET, EXTENDED RELEASE ORAL at 08:25

## 2019-01-10 RX ADMIN — CEFTRIAXONE SODIUM 2 G: 2 INJECTION, POWDER, FOR SOLUTION INTRAMUSCULAR; INTRAVENOUS at 09:26

## 2019-01-10 RX ADMIN — INSULIN GLARGINE 5 UNITS: 100 INJECTION, SOLUTION SUBCUTANEOUS at 16:10

## 2019-01-10 RX ADMIN — LEVOTHYROXINE SODIUM 137 MCG: 137 TABLET ORAL at 22:04

## 2019-01-10 RX ADMIN — ONDANSETRON 4 MG: 2 INJECTION INTRAMUSCULAR; INTRAVENOUS at 16:18

## 2019-01-10 RX ADMIN — INSULIN ASPART 3 UNITS: 100 INJECTION, SOLUTION INTRAVENOUS; SUBCUTANEOUS at 08:25

## 2019-01-10 RX ADMIN — SIMVASTATIN 40 MG: 40 TABLET, FILM COATED ORAL at 22:04

## 2019-01-10 RX ADMIN — APIXABAN 5 MG: 5 TABLET, FILM COATED ORAL at 19:59

## 2019-01-10 RX ADMIN — SODIUM CHLORIDE 1000 ML: 9 INJECTION, SOLUTION INTRAVENOUS at 22:48

## 2019-01-10 RX ADMIN — INSULIN ASPART 2 UNITS: 100 INJECTION, SOLUTION INTRAVENOUS; SUBCUTANEOUS at 17:50

## 2019-01-10 ASSESSMENT — ACTIVITIES OF DAILY LIVING (ADL)
ADLS_ACUITY_SCORE: 12

## 2019-01-10 NOTE — PROGRESS NOTES
[facility]  Hospitalist Progress Note  Meenu Catherine MD 01/10/2019    Reason for Stay (Diagnosis): Osteomyelitis         Assessment and Plan:      Summary of Stay: Jayro Elder is a 68 year old male with a history of type 2 diabetes complicated by prior diabetic foot infections, coronary artery disease with stents, and associated ischemic cardiomyopathy with most recent echocardiogram completed 9/2018 showing an ejection fraction of 35-40% with indeterminate diastolic dysfunction, stress testing completed on 9/14/2018 showing a fixed large mid to distal anterior and anteroseptal defect, atrial fibrillation on chronic apixaban, untreated obstructive sleep apnea admitted on 1/5/2019 with necrotic appearing right second toe with surrounding foot and leg erythema consistent with cellulitis and found to have osteomyelitis.    He was initially placed on broad-spectrum antibiotics that is subsequently been weaned down to ceftriaxone as strep is coming back as the only positive organism.  And he has been taken to the OR twice for successive amputations to ensure that the osteomyelitis has been completely removed.    His brief hospitalization has been complicated by acute mildly oliguric renal failure with an increase in his creatinine from approximately 1-3.0.  Suspect multifactorial in etiology: Acute infection, vancomycin, and suspected chronic underlying vulnerability.  Problem List:   1. Osteomyelitis: The gas-forming streptococcal infection of the right second toe status post partial amputation.  Has had surgery on both January 6 and January 8.  Awaiting pathology per podiatry doubt there will be need for additional surgery.  Appreciate both podiatry and infectious disease input.  Continue with ceftriaxone while in-house, likely can switch to oral once pathology back improves no ongoing deep infection  2. Acute kidney injury: Does have some reasonable urine output about 600 over the past 12 hours.  I  suspect this is multifactorial including drug related vancomycin injury, prerenal azotemia and ACE inhibitor usage, and questionable infection related.  No evidence of significant urinary retention by bladder scanning.  Have ordered FeNa is at risk for both prerenal azotemia and ATN.  FeNA returns at 1.2 so likely a combo.  Recheck BMP with creat 3.2-so still trending up but rate of rise seems to be slowing down and UO picking up, will cont with IVF overnight-if creat still climbing will ask for nephrology input.   3. Intermittent right lower quadrant pain that spreads up and causes chest pain and associated shortness of breath: Present for over a year, and appears stable.  Etiology is unknown continue to monitor for now, given duration of symptoms doubt of significant clinical impact  4. Type 2 diabetes: Typically on use glargine 35 units daily, aspart 8 units 3 times daily pre-meal.  Continues on his pre-meal aspart as well as insulin sliding scale and blood sugars are inadequately controlled.  Will start glargine 5 unit(s) every day, and titrate as needed, a little leary of jumping into home dosing in setting of ABNER-, continue aspart insulin sliding scale  5. Coronary artery disease: Continue home , isosorbide, beta-blocker, and statin, and resume clopidogrel when clear no need for further interventions  6. Ischemic cardiomyopathy with most recent echocardiogram in this September 2018 showing an ejection fraction of 30-35%.  Baseline meds metoprolol XL at 50 mg p.o. nightly, lisinopril 20 mg p.o. twice daily, and isosorbide 30 mg p.o. Daily-hold ACE I in setting of ABNER  7. Chronic atrial fibrillation: Anticoagulated with apixaban  8. Hypertension: Prior to admission medications are amlodipine 5 mg p.o. daily, lisinopril 20 mg p.o. daily, isosorbide mononitrate 30 mg p.o. daily and metoprolol XL 50 mg p.o. daily.  All of been resumed except ACE inhibitor has been held in the setting of his acute kidney injury and  "BPs are under fair control  9. Hypothyroidism resumed home levothyroxine  DVT Prophylaxis: On apixaban  Code Status: Full Code    Disposition Plan   Expected discharge in 2-3 days to tbd once pathology back, antibiotic regimen in place, and PT eval completed     Entered: Meenu Catherine 01/10/2019, 2:33 PM               Interval History (Subjective):      Had a terrible night last night.  States he has had a year-long history of intermittent right lower quadrant abdominal pain that after perhaps half an hour or so starts to radiate up to his chest and cause chest discomfort and associated shortness of breath.  That ultimately will resolve without intervention in about half an hour.  The total episode takes about an hour.  He has been having this off and on for the past year tends to happen at nighttime and is as frequent as every other night.  He does not believe that it is changing in intensity or frequency.  He has not had it evaluated in the past he notes no precipitating or relieving factors but last night he was bad in case the [place] kept him awake for much of the night it is subsequently resolved    Otherwise is feeling okay tolerated breakfast but feels nauseated and not particularly hungry although he did not vomit.  Thinks he might be constipated as well.                  Physical Exam:      Last Vital Signs:  /86 (BP Location: Left arm)   Pulse 78   Temp 96.5  F (35.8  C) (Oral)   Resp 16   Ht 1.93 m (6' 4\")   Wt 113.4 kg (250 lb)   SpO2 96%   BMI 30.43 kg/m      I/O:not accurate    Pleasant chronically ill in appearance but no acute distress during my interview and exam head is normocephalic atraumatic although somewhat disheveled lungs actually are pretty clear to auscultation he exhibits normal respiratory effort heart is of a regular rate and rhythm without murmurs rubs or gallops abdominal exam is currently soft nontender nondistended skin is warm and dry and there is no cyanosis or clubbing " of the extremities affect is appropriate and he is moving all extremities.  Right foot remains wrapped and marking for his cellulitis appears dramatically improved           Medications:      All current medications were reviewed with changes reflected in problem list.         Data:      All new lab and imaging data was reviewed.   Labs:  Recent Labs   Lab 01/10/19  0643      POTASSIUM 3.8   CHLORIDE 104   CO2 25   ANIONGAP 9   *   BUN 18   CR 3.00*   GFRESTIMATED 20*   GFRESTBLACK 24*   BETTIE 8.3*     Recent Labs   Lab 01/09/19  0722   WBC 7.9   HGB 12.4*   HCT 37.1*   MCV 87        Recent Labs   Lab 01/10/19  1201 01/10/19  0742 01/10/19  0643 01/10/19  0104 01/09/19  2102 01/09/19  1739  01/09/19  0722  01/07/19  0727  01/06/19  0652  01/05/19  1559   GLC  --   --  218*  --   --   --   --  186*  --  221*  --  184*  --  346*   * 214*  --  205* 256* 241*   < >  --    < >  --    < >  --    < >  --     < > = values in this interval not displayed.      Imaging:

## 2019-01-10 NOTE — PLAN OF CARE
Neuro: A & O x 4. Calm, cooperative. Baseline neuropathy to all extremities other CMS intact.   VS:  VSS  Tele: N/A  Respiratory: WDL  GI: Pt reports indigestion, difficulty eating. Poor appetite. Denies nausea.   : WDL. PVR done x 1 - 98 ml.   Skin: Healing surgical wound to feet bilaterally. See flow sheet.  Pain: denies.   IV: Midline to right upper extremity. WDL.   Transfer: Up independently.   Diet: Mod Cho - poor appetite.   Plan:TBD.  Hospitalist following. Monitor PRVs BID and PRN, Fluid bolus this shift, monitor kidney function.

## 2019-01-10 NOTE — PROGRESS NOTES
[facility]  Infectious Disease Progress Note          Assessment and Plan:   IMPRESSION:   1.  A 68-year-old male with acute right diabetic foot infection including second toe osteomyelitis, cultures, primarily strep, amputation done at this point.  No major clinical sepsis.   2.  Prior episode in the same foot of bacteremia with strep and osteomyelitis in a third toe earlier this year.   3.  Diabetes mellitus with neuropathy.   4.  Chronic left first metatarsal ulcer, no clear deep infection.  Podiatry following.     5.  Prior cerebrovascular infarction.   6.  Coronary artery disease.   7.  Chronic atrial fibrillation.   8 Acute renal failure 1/9, ? vanc0     RECOMMENDATIONS:   1.  Only strep in cx and now renal failure, cont  ceftriaxone  2.  On this occasion, assuming confident all major bone infection resected with no bacteremia, should be able to treat orally.     3.  Latest op noted, await path, follow foot, all signs no residual deep infection  4 Creat to 3, is urinating follow closely               Interval History:   no new complaints and doing well; no cp, sob, n/v/d, or abd pain. Pain Ok, no fever BC neg only strep foot cx  Creat 3              Medications:       sodium chloride 0.9%  1,000 mL Intravenous Once     amLODIPine  5 mg Oral Daily     apixaban ANTICOAGULANT  5 mg Oral BID     cefTRIAXone  2 g Intravenous Q24H     insulin aspart  8 Units Subcutaneous TID w/meals     insulin aspart  1-10 Units Subcutaneous TID AC     insulin aspart  1-7 Units Subcutaneous At Bedtime     isosorbide mononitrate  30 mg Oral Daily     levothyroxine  137 mcg Oral At Bedtime     metoprolol succinate ER  50 mg Oral At Bedtime     pantoprazole  40 mg Oral QAM AC     simvastatin  40 mg Oral At Bedtime     sodium chloride (PF)  10 mL Intracatheter Q8H     sodium chloride (PF)  10 mL Intracatheter Q8H     sodium chloride (PF)  3 mL Intracatheter Q8H     sodium chloride (PF)  3 mL Intracatheter Q8H  "    vitamin D3  2,000 Units Oral QPM                  Physical Exam:   Blood pressure 143/86, pulse 78, temperature 96.5  F (35.8  C), temperature source Oral, resp. rate 16, height 1.93 m (6' 4\"), weight 113.4 kg (250 lb), SpO2 96 %.  Wt Readings from Last 2 Encounters:   01/05/19 113.4 kg (250 lb)   09/06/18 115.1 kg (253 lb 11.2 oz)     Vital Signs with Ranges  Temp:  [96.5  F (35.8  C)-97.2  F (36.2  C)] 96.5  F (35.8  C)  Pulse:  [78] 78  Heart Rate:  [74-79] 74  Resp:  [16] 16  BP: (143-161)/(86-91) 143/86  SpO2:  [95 %-96 %] 96 %    Constitutional: Awake, alert, cooperative, no apparent distress   Lungs: Clear to auscultation bilaterally, no crackles or wheezing   Cardiovascular: Regular rate and rhythm, normal S1 and S2, and no murmur noted   Abdomen: Normal bowel sounds, soft, non-distended, non-tender   Skin: No rashes, no cyanosis, same edema sl cellulitis, foot wrapped   Other:           Data:   All microbiology laboratory data reviewed.  Recent Labs   Lab Test 01/09/19  0722 01/07/19  0727 01/06/19  0652   WBC 7.9 5.6 6.9   HGB 12.4* 12.1* 11.9*   HCT 37.1* 35.8* 36.0*   MCV 87 87 87    189 182     Recent Labs   Lab Test 01/10/19  0643 01/09/19  0722 01/07/19  0727   CR 3.00* 2.03* 0.83     Recent Labs   Lab Test 09/08/18  0614   SED 15     Recent Labs   Lab Test 01/06/19  1436 01/05/19  1708 01/05/19  1558 09/09/18  1533 09/08/18  0613 09/06/18  1550 09/06/18  1545 09/06/18  1541 03/03/18  1936   CULT Heavy growth  Finegoldia magna (Peptostreptococcus jennifer)  Susceptibility testing not routinely done  *  Heavy growth  Streptococcus dysgalactiae serogroup C/G  *  Moderate growth  Staphylococcus lugdunensis  *  Light growth  Streptococcus dysgalactiae serogroup C/G  Susceptibility testing done on previous specimen  *  Single colony  Coagulase negative Staphylococcus  Susceptibility testing not routinely done  These bacteria are part of normal skin michael, but on occasion, may be true " pathogens.    Clinical correlation must be applied to interpreting this microbiology result.  *  Culture negative monitoring continues No growth after 5 days No growth after 5 days No growth No growth Cultured on the 1st day of incubation:  Streptococcus agalactiae sero group B  *  Critical Value/Significant Value, preliminary result only, called to and read back by  Teresa John RN on RHMS5 @1055 on 9/7/18. ch.    (Note)  POSITIVE for STREPTOCOCCUS AGALACTIAE (Group B Strep) by [employer] nucleic acid test. Penicillin and ampicillin are drugs of  choice, nonsusceptible isolates have not been reported. Final  identification and antimicrobial susceptibility testing will be  verified by standard methods.    Specimen tested with [employer] multiplex, gram-positive blood culture  nucleic acid test for the following targets: Staph aureus, Staph  epidermidis, Staph lugdunensis, other Staph species, Enterococcus  faecalis, Enterococcus faecium, Streptococcus species, S. agalactiae,  S. anginosus grp., S. pneumoniae, S. pyogenes, Listeria sp., mecA  (methicillin resistance) and Elisha/B (vancomycin resistance).    Critical Value/Significant Value called to and read back by Teresa John RN on 9.7.18 at 1340. bw     Cultured on the 1st day of incubation:  Streptococcus agalactiae sero group B  Susceptibility testing done on previous specimen  *  Critical Value/Significant Value, preliminary result only, called to and read back by  Ofe Fernandes RN on RHMS5 @1515 on 9/7/18. ch.   Heavy growth  Staphylococcus aureus  *  Heavy growth  Beta hemolytic Streptococcus group B  *  Moderate growth  Normal skin michael    No growth No growth

## 2019-01-10 NOTE — PLAN OF CARE
A&O x4. BP slightly elevated, otherwise  VSS. LS CTA all fields. BS  active x4, PRN senna-s given.  Dressing to R foot is CDI. Baseline Neuropathy otherwise CMS intact. Surgical area at L second toe is TELMA, no s/s of  infection noted. Jeremi mod CHO diet well,up with SBA with R orthotic shoe and heel WB with GB. Denies pain. voiding in good amts. Monitoring Cr. On IV rocephin. Plans to discharge home when medically stable.

## 2019-01-10 NOTE — CONSULTS
"Care Transition Initial Assessment - RN        Met with: Patient.  DATA   Active Problems:    Cellulitis of right lower leg       Cognitive Status: awake, alert and oriented.  Primary Care Clinic Name: [facility]   Primary Care MD Name: PIERRE   Contact information and PCP information verified: Yes  Lives With: spouse   Living Arrangements: house     Description of Support System: Supportive, Involved   Who is your support system?: Wife       Insurance concerns: No Insurance issues identified  ASSESSMENT  Identified issues/concerns regarding health management:CTS following average [place] pt with hx of DM,  A1C noted 11.7 , S/p OR on foot r/t DM. Assessing for gaps and needs.  Pt reports that regarding his A1C \"I have had [employer] insurance and it was not covering my medications or supplies, I have now made change to [employer]\" Verified with pt that in making insurance change he is expecting insurance coverage of supplies and medications dn is expressing a plan to regain control of his DM.  reiteration of foot care and elevated A1C and effects on other organs/body systems.     Pt denies having a PCP yet does see \"[facility]\" Mostly follows with Endocrinologist Dr. Scott , \"not very often\"   Spoke about checking sugars and keeping track as a way to best determine effectiveness of current medication regimen.   Pt was not invested in conversation with CTS.  Yet does state that he is hopeful to get better control \"now that my insurance will cover my medications.     Will recommend Clinic Care Coordination for ongoing assessment and intervention in relation to managing Chronic conditions in PCP handoff.     Pt will follow up with Podiatry as directed, noted POD documentation -   \"We will monitor cultures as an outpatient  PO antibiotic on discharge per ID; all bone infection should be removed.  Podiatry will sign off.\"       Pt is not interested in Follow up appt with Davion Pierre at this time.  Pt has been " recommended home on discharge per PT with assist of Wife.     NO further needs     Che Mcguire RN, BSN, Care Transitions Specialist   Care Transitions Team  [phone]

## 2019-01-11 LAB
ALBUMIN SERPL-MCNC: 2.6 G/DL (ref 3.4–5)
ALP SERPL-CCNC: 62 U/L (ref 40–150)
ALT SERPL W P-5'-P-CCNC: 14 U/L (ref 0–70)
ANION GAP SERPL CALCULATED.3IONS-SCNC: 8 MMOL/L (ref 3–14)
ANION GAP SERPL CALCULATED.3IONS-SCNC: 9 MMOL/L (ref 3–14)
AST SERPL W P-5'-P-CCNC: 9 U/L (ref 0–45)
BACTERIA SPEC CULT: NO GROWTH
BACTERIA SPEC CULT: NO GROWTH
BASOPHILS # BLD AUTO: 0 10E9/L (ref 0–0.2)
BASOPHILS NFR BLD AUTO: 0.4 %
BILIRUB SERPL-MCNC: 0.6 MG/DL (ref 0.2–1.3)
BUN SERPL-MCNC: 18 MG/DL (ref 7–30)
BUN SERPL-MCNC: 19 MG/DL (ref 7–30)
CALCIUM SERPL-MCNC: 8.5 MG/DL (ref 8.5–10.1)
CALCIUM SERPL-MCNC: 8.5 MG/DL (ref 8.5–10.1)
CHLORIDE SERPL-SCNC: 106 MMOL/L (ref 94–109)
CHLORIDE SERPL-SCNC: 108 MMOL/L (ref 94–109)
CO2 SERPL-SCNC: 24 MMOL/L (ref 20–32)
CO2 SERPL-SCNC: 25 MMOL/L (ref 20–32)
COPATH REPORT: NORMAL
CREAT SERPL-MCNC: 3.13 MG/DL (ref 0.66–1.25)
CREAT SERPL-MCNC: 3.3 MG/DL (ref 0.66–1.25)
DIFFERENTIAL METHOD BLD: ABNORMAL
EOSINOPHIL # BLD AUTO: 0.1 10E9/L (ref 0–0.7)
EOSINOPHIL NFR BLD AUTO: 1.7 %
ERYTHROCYTE [DISTWIDTH] IN BLOOD BY AUTOMATED COUNT: 13 % (ref 10–15)
GFR SERPL CREATININE-BSD FRML MDRD: 18 ML/MIN/{1.73_M2}
GFR SERPL CREATININE-BSD FRML MDRD: 19 ML/MIN/{1.73_M2}
GLUCOSE BLDC GLUCOMTR-MCNC: 123 MG/DL (ref 70–99)
GLUCOSE BLDC GLUCOMTR-MCNC: 139 MG/DL (ref 70–99)
GLUCOSE BLDC GLUCOMTR-MCNC: 151 MG/DL (ref 70–99)
GLUCOSE BLDC GLUCOMTR-MCNC: 171 MG/DL (ref 70–99)
GLUCOSE BLDC GLUCOMTR-MCNC: 277 MG/DL (ref 70–99)
GLUCOSE SERPL-MCNC: 181 MG/DL (ref 70–99)
GLUCOSE SERPL-MCNC: 207 MG/DL (ref 70–99)
HCT VFR BLD AUTO: 36.4 % (ref 40–53)
HGB BLD-MCNC: 11.8 G/DL (ref 13.3–17.7)
IMM GRANULOCYTES # BLD: 0 10E9/L (ref 0–0.4)
IMM GRANULOCYTES NFR BLD: 0.3 %
LYMPHOCYTES # BLD AUTO: 0.8 10E9/L (ref 0.8–5.3)
LYMPHOCYTES NFR BLD AUTO: 11.5 %
Lab: NORMAL
Lab: NORMAL
MCH RBC QN AUTO: 28.6 PG (ref 26.5–33)
MCHC RBC AUTO-ENTMCNC: 32.4 G/DL (ref 31.5–36.5)
MCV RBC AUTO: 88 FL (ref 78–100)
MONOCYTES # BLD AUTO: 0.7 10E9/L (ref 0–1.3)
MONOCYTES NFR BLD AUTO: 9.9 %
NEUTROPHILS # BLD AUTO: 5.5 10E9/L (ref 1.6–8.3)
NEUTROPHILS NFR BLD AUTO: 76.2 %
NRBC # BLD AUTO: 0 10*3/UL
NRBC BLD AUTO-RTO: 0 /100
PLATELET # BLD AUTO: 238 10E9/L (ref 150–450)
POTASSIUM SERPL-SCNC: 3.7 MMOL/L (ref 3.4–5.3)
POTASSIUM SERPL-SCNC: 3.7 MMOL/L (ref 3.4–5.3)
PROT SERPL-MCNC: 6 G/DL (ref 6.8–8.8)
RBC # BLD AUTO: 4.13 10E12/L (ref 4.4–5.9)
SODIUM SERPL-SCNC: 139 MMOL/L (ref 133–144)
SODIUM SERPL-SCNC: 141 MMOL/L (ref 133–144)
SPECIMEN SOURCE: NORMAL
SPECIMEN SOURCE: NORMAL
WBC # BLD AUTO: 7.2 10E9/L (ref 4–11)

## 2019-01-11 PROCEDURE — 87493 C DIFF AMPLIFIED PROBE: CPT | Performed by: INTERNAL MEDICINE

## 2019-01-11 PROCEDURE — 00000146 ZZHCL STATISTIC GLUCOSE BY METER IP

## 2019-01-11 PROCEDURE — 80048 BASIC METABOLIC PNL TOTAL CA: CPT | Performed by: INTERNAL MEDICINE

## 2019-01-11 PROCEDURE — 25000128 H RX IP 250 OP 636: Performed by: INTERNAL MEDICINE

## 2019-01-11 PROCEDURE — 12000000 ZZH R&B MED SURG/OB

## 2019-01-11 PROCEDURE — 25000132 ZZH RX MED GY IP 250 OP 250 PS 637: Performed by: INTERNAL MEDICINE

## 2019-01-11 PROCEDURE — 25000131 ZZH RX MED GY IP 250 OP 636 PS 637: Performed by: INTERNAL MEDICINE

## 2019-01-11 PROCEDURE — 80053 COMPREHEN METABOLIC PANEL: CPT | Performed by: INTERNAL MEDICINE

## 2019-01-11 PROCEDURE — 99233 SBSQ HOSP IP/OBS HIGH 50: CPT | Performed by: INTERNAL MEDICINE

## 2019-01-11 PROCEDURE — 36415 COLL VENOUS BLD VENIPUNCTURE: CPT | Performed by: INTERNAL MEDICINE

## 2019-01-11 PROCEDURE — 85025 COMPLETE CBC W/AUTO DIFF WBC: CPT | Performed by: INTERNAL MEDICINE

## 2019-01-11 RX ADMIN — APIXABAN 5 MG: 5 TABLET, FILM COATED ORAL at 21:38

## 2019-01-11 RX ADMIN — ISOSORBIDE MONONITRATE 30 MG: 30 TABLET, EXTENDED RELEASE ORAL at 09:14

## 2019-01-11 RX ADMIN — SIMVASTATIN 40 MG: 40 TABLET, FILM COATED ORAL at 21:39

## 2019-01-11 RX ADMIN — CEFTRIAXONE SODIUM 2 G: 2 INJECTION, POWDER, FOR SOLUTION INTRAMUSCULAR; INTRAVENOUS at 09:14

## 2019-01-11 RX ADMIN — ONDANSETRON 4 MG: 2 INJECTION INTRAMUSCULAR; INTRAVENOUS at 17:01

## 2019-01-11 RX ADMIN — LEVOTHYROXINE SODIUM 137 MCG: 137 TABLET ORAL at 21:39

## 2019-01-11 RX ADMIN — ONDANSETRON 4 MG: 2 INJECTION INTRAMUSCULAR; INTRAVENOUS at 23:03

## 2019-01-11 RX ADMIN — VITAMIN D, TAB 1000IU (100/BT) 2000 UNITS: 25 TAB at 21:39

## 2019-01-11 RX ADMIN — INSULIN ASPART 2 UNITS: 100 INJECTION, SOLUTION INTRAVENOUS; SUBCUTANEOUS at 09:15

## 2019-01-11 RX ADMIN — INSULIN ASPART 6 UNITS: 100 INJECTION, SOLUTION INTRAVENOUS; SUBCUTANEOUS at 12:24

## 2019-01-11 RX ADMIN — METOPROLOL SUCCINATE 50 MG: 50 TABLET, EXTENDED RELEASE ORAL at 21:39

## 2019-01-11 RX ADMIN — ACETAMINOPHEN 650 MG: 325 TABLET, FILM COATED ORAL at 19:57

## 2019-01-11 RX ADMIN — APIXABAN 5 MG: 5 TABLET, FILM COATED ORAL at 09:14

## 2019-01-11 RX ADMIN — AMLODIPINE BESYLATE 5 MG: 5 TABLET ORAL at 09:14

## 2019-01-11 RX ADMIN — PANTOPRAZOLE SODIUM 40 MG: 40 TABLET, DELAYED RELEASE ORAL at 07:59

## 2019-01-11 RX ADMIN — SODIUM CHLORIDE 1000 ML: 9 INJECTION, SOLUTION INTRAVENOUS at 16:43

## 2019-01-11 RX ADMIN — INSULIN GLARGINE 5 UNITS: 100 INJECTION, SOLUTION SUBCUTANEOUS at 09:21

## 2019-01-11 ASSESSMENT — ACTIVITIES OF DAILY LIVING (ADL)
ADLS_ACUITY_SCORE: 12

## 2019-01-11 NOTE — PROGRESS NOTES
Discharge Planner   Discharge Plans in progress: Home with Assist of Spouse   Barriers to discharge plan: Medical stability, Renal fxn's elevated   Follow up plan: NOt anticipating any needs , PCP ADDISON pended        Entered by: Che Mcguire 01/11/2019 1:31 PM

## 2019-01-11 NOTE — PROGRESS NOTES
[facility]  Infectious Disease Progress Note          Assessment and Plan:   IMPRESSION:   1.  A 68-year-old male with acute right diabetic foot infection including second toe osteomyelitis, cultures, primarily strep, amputation done at this point.  No major clinical sepsis.   2.  Prior episode in the same foot of bacteremia with strep and osteomyelitis in a third toe earlier this year.   3.  Diabetes mellitus with neuropathy.   4.  Chronic left first metatarsal ulcer, no clear deep infection.  Podiatry following.     5.  Prior cerebrovascular infarction.   6.  Coronary artery disease.   7.  Chronic atrial fibrillation.   8 Acute renal failure 1/9, ? vanc0     RECOMMENDATIONS:   1.  Only strep in cx and now renal failure, cont  ceftriaxone  2.  On this occasion, assuming confident all major bone infection resected with no bacteremia, should be able to treat orally.     3.  Latest op noted, await path, follow foot, all signs no residual deep infection  4 Creat still 3, is urinating follow closely,when disposition ready augmentin 10 days, 875 daily if creat over 2               Interval History:   no new complaints and doing well; no cp, sob, n/v/d, or abd pain. Pain Ok, no fever BC neg only strep foot cx  Creat 3 still              Medications:       amLODIPine  5 mg Oral Daily     apixaban ANTICOAGULANT  5 mg Oral BID     cefTRIAXone  2 g Intravenous Q24H     insulin aspart  8 Units Subcutaneous TID w/meals     insulin aspart  1-10 Units Subcutaneous TID AC     insulin aspart  1-7 Units Subcutaneous At Bedtime     insulin glargine  5 Units Subcutaneous QAM AC     isosorbide mononitrate  30 mg Oral Daily     levothyroxine  137 mcg Oral At Bedtime     metoprolol succinate ER  50 mg Oral At Bedtime     pantoprazole  40 mg Oral QAM AC     simvastatin  40 mg Oral At Bedtime     sodium chloride (PF)  10 mL Intracatheter Q8H     vitamin D3  2,000 Units Oral QPM                  Physical Exam:  "  Blood pressure 158/86, pulse 77, temperature 98.7  F (37.1  C), temperature source Oral, resp. rate 18, height 1.93 m (6' 4\"), weight 113.4 kg (250 lb), SpO2 91 %.  Wt Readings from Last 2 Encounters:   01/05/19 113.4 kg (250 lb)   09/06/18 115.1 kg (253 lb 11.2 oz)     Vital Signs with Ranges  Temp:  [98.7  F (37.1  C)-99  F (37.2  C)] 98.7  F (37.1  C)  Pulse:  [72-77] 77  Heart Rate:  [72-77] 77  Resp:  [16-18] 18  BP: (128-158)/(75-86) 158/86  SpO2:  [91 %-93 %] 91 %    Constitutional: Awake, alert, cooperative, no apparent distress   Lungs: Clear to auscultation bilaterally, no crackles or wheezing   Cardiovascular: Regular rate and rhythm, normal S1 and S2, and no murmur noted   Abdomen: Normal bowel sounds, soft, non-distended, non-tender   Skin: No rashes, no cyanosis, same edema sl cellulitis, foot wrapped   Other:           Data:   All microbiology laboratory data reviewed.  Recent Labs   Lab Test 01/11/19  0653 01/09/19  0722 01/07/19  0727   WBC 7.2 7.9 5.6   HGB 11.8* 12.4* 12.1*   HCT 36.4* 37.1* 35.8*   MCV 88 87 87    246 189     Recent Labs   Lab Test 01/11/19  0653 01/10/19  1443 01/10/19  0643   CR 3.13* 3.18* 3.00*     Recent Labs   Lab Test 09/08/18  0614   SED 15     Recent Labs   Lab Test 01/06/19  1436 01/05/19  1708 01/05/19  1558 09/09/18  1533 09/08/18  0613 09/06/18  1550 09/06/18  1545 09/06/18  1541 03/03/18  1936   CULT Heavy growth  Finegoldia magna (Peptostreptococcus jennifer)  Susceptibility testing not routinely done  *  Heavy growth  Streptococcus dysgalactiae serogroup C/G  *  Moderate growth  Staphylococcus lugdunensis  *  Light growth  Streptococcus dysgalactiae serogroup C/G  Susceptibility testing done on previous specimen  *  Single colony  Coagulase negative Staphylococcus  Susceptibility testing not routinely done  These bacteria are part of normal skin michael, but on occasion, may be true pathogens.    Clinical correlation must be applied to interpreting this " microbiology result.  *  Culture negative monitoring continues No growth No growth No growth No growth Cultured on the 1st day of incubation:  Streptococcus agalactiae sero group B  *  Critical Value/Significant Value, preliminary result only, called to and read back by  Teresa John RN on [email or web address] @1055 on 9/7/18. ch.    (Note)  POSITIVE for STREPTOCOCCUS AGALACTIAE (Group B Strep) by [employer]  multiplex nucleic acid test. Penicillin and ampicillin are drugs of  choice, nonsusceptible isolates have not been reported. Final  identification and antimicrobial susceptibility testing will be  verified by standard methods.    Specimen tested with [employer]Trian[employer]ene multiplex, gram-positive blood culture  nucleic acid test for the following targets: Staph aureus, Staph  epidermidis, Staph lugdunensis, other Staph species, Enterococcus  faecalis, Enterococcus faecium, Streptococcus species, S. agalactiae,  S. anginosus grp., S. pneumoniae, S. pyogenes, Listeria sp., mecA  (methicillin resistance) and Elisha/B (vancomycin resistance).    Critical Value/Significant Value called to and read back by Teresa John RN on 9.7.18 at 1340. bw     Cultured on the 1st day of incubation:  Streptococcus agalactiae sero group B  Susceptibility testing done on previous specimen  *  Critical Value/Significant Value, preliminary result only, called to and read back by  Ofe Fernandes RN on RHMS5 @1515 on 9/7/18. ch.   Heavy growth  Staphylococcus aureus  *  Heavy growth  Beta hemolytic Streptococcus group B  *  Moderate growth  Normal skin michael    No growth No growth

## 2019-01-11 NOTE — CONSULTS
"CLINICAL NUTRITION SERVICES  -  ASSESSMENT NOTE      Malnutrition: Patient does not meet malnutrition criteria at this time        REASON FOR ASSESSMENT  Jayro Elder is a 68 year old male seen by Registered Dietitian for LOS.      NUTRITION HISTORY  - Information obtained from patient and chart.    - Patient with a h/o CAD, cardiomyopathy, DMII (on insulin), CVA.  Admitted 2/2 infection of R 2nd toe.    - Patient reports regular diet at home.  He does not verbalize CHO restriction.  - Meals BID is baseline.  He is quite vague when discussing specifics.  He often skips breakfast.  - Denies a decrease in oral intakes PTA.  - NKFA.       CURRENT NUTRITION ORDERS  Diet Order:     Mod CHO    Current Intake/Tolerance:  % intake since admission.  Ordering meals BID-TID per system review.  Patient himself reports his appetite \"isn't great\".  He notes he had some abdominal pain and a headache yesterday.  He is not interested in oral supplements for a protein source.  He likes meats but has not been eating them \"recently\".  He hates yogurt and beans, he likes eggs.        PHYSICAL FINDINGS  Observed  See below   Obtained from Chart/Interdisciplinary Team  2nd toe partial amputation 1/06/2019  Revision of R 2nd toe amputation 1/08/2019  BM yesterday     ANTHROPOMETRICS  Height: 6' 4\"  Weight: 113.4 kg (250#)  Body mass index is 30.43 kg/m .  Weight Status:  Obesity Grade I BMI 30-34.9  IBW: 91.8 kg (202#)  % IBW: 124%  Weight History:   Wt Readings from Last 10 Encounters:   01/05/19 113.4 kg (250 lb)   09/06/18 115.1 kg (253 lb 11.2 oz)   03/12/18 114.3 kg (252 lb)   03/03/18 109.6 kg (241 lb 9.6 oz)   10/25/17 112.9 kg (249 lb)   10/19/17 110.7 kg (244 lb)   06/23/17 106.6 kg (235 lb)   06/22/17 107.3 kg (236 lb 8 oz)   06/17/17 101.4 kg (223 lb 8.7 oz)   06/13/17 104.8 kg (231 lb)   - No wt changes noted since 3/2018.    LABS  Labs reviewed:  Lab Results   Component Value Date    A1C 11.7 01/05/2019    A1C 9.4 " "09/06/2018    A1C 8.8 03/04/2018    A1C 11.7 04/22/2017    A1C 13.1 03/31/2017       MEDICATIONS  Medications reviewed      ASSESSED NUTRITION NEEDS PER APPROVED PRACTICE GUIDELINES:    Dosing Weight 97.2 kg - adjusted weight   Estimated Energy Needs: 4234-0310 kcals (25-30 Kcal/Kg)  Justification: maintenance  Estimated Protein Needs: 117-146 grams protein (1.2-1.5 g pro/Kg)  Justification: post-op  Estimated Fluid Needs: per MD  Justification: per MD    MALNUTRITION:  % Weight Loss:  None noted  % Intake:  Decreased intake does not meet criteria for malnutrition   Subcutaneous Fat Loss:  None observed  Muscle Loss:  None observed  Fluid Retention:  None noted    Malnutrition Diagnosis: Patient does not meet two of the above criteria necessary for diagnosing malnutrition    NUTRITION DIAGNOSIS:  Increased nutrient needs (protein) related to post-op wound healing as evidenced by protein needs of 1.2-1.5 g/kg/day.      NUTRITION INTERVENTIONS  Recommendations / Nutrition Prescription  Change to high CHO diet order given assessed needs above.  Protein focus.      Ok for prn supplements.  Patient denied scheduling.        Implementation  Nutrition education: Provided education on high CHO diet order.  Briefly reviewed foods which contain protein and encouraged incorporation into each meal.  Discussed oral supplement options in relation to wound healing.  Patient not interested, \"I'll just drink apple juice\".      Medical Food Supplement: As above.    Collaboration and Referral of Nutrition care: Discussed POC with RN Care Coordinator.        Nutrition Goals  Patient to consume at least 75% of meals TID or equivalent with supplements.    Protein sources with each meal.      MONITORING AND EVALUATION:  Progress towards goals will be monitored and evaluated per protocol and Practice Guidelines          Elizabeth Terry RD, LD  Clinical Dietitian  3rd floor/ICU: [phone]  All other floors: " [phone]  Weekend/holiday: [phone]

## 2019-01-11 NOTE — PLAN OF CARE
A/O, VSS minimal pain denies pain meds.  Up indp with boot Heel touch weight bearing RLE.  Cellulitis pink neuropathy baseline.  IV Rocephin.  Cr elevated fluid bolus given recheck labs this morning.

## 2019-01-11 NOTE — PLAN OF CARE
Neuro: A & O x 4. Calm, cooperative. Baseline neuropathy to all extremities other CMS intact.   VS:  VSS  Tele: N/A  Respiratory: WDL  GI: Pt reports looses BM this shift.  : WDL. PVR done x 1 - 56 ml.   Skin: Healing surgical wound to feet bilaterally. See flow sheet.  Pain: denies.   IV: Midline to right upper extremity. WDL SL.   Transfer: Up independently.   Diet: High Cho - poor appetite.   Plan:TBD.  Hospitalist following. Monitor PRVs BID and PRN, monitor kidney function.

## 2019-01-11 NOTE — PLAN OF CARE
Pt  A & O x 4. Baseline neuropathy to all extremities other CMS intact. VSS. LS clear.  Pt reports indigestion, difficulty eating. Poor appetite. Nauseated at beginning of shift. IV Zofran given with some relieve. WDL. PVR done x 1 - 98 ml.  dressing to R foot c/d/i.  and 226. Lantus started this evening. Denied having pain.  Midline to right upper extremity. WDL. up independently with orthotic boot Heel wt bear. Mod Cho - poor appetite.  Fluid bolus this shift, monitor kidney function. Creatinine 3.18. Md aware. Will continue to monitor. Pt would like to discharge home tomorrow but that is TBD.

## 2019-01-11 NOTE — PROGRESS NOTES
[facility]  Hospitalist Progress Note  Meenu Catherine MD 01/11/2019    Reason for Stay (Diagnosis): Osteomyelitis         Assessment and Plan:      Summary of Stay: Jayro Elder is a 68 year old male with a history of type 2 diabetes complicated by prior diabetic foot infections, coronary artery disease with stents, and associated ischemic cardiomyopathy with most recent echocardiogram completed 9/2018 showing an ejection fraction of 35-40% with indeterminate diastolic dysfunction, stress testing completed on 9/14/2018 showing a fixed large mid to distal anterior and anteroseptal defect, atrial fibrillation on chronic apixaban, untreated obstructive sleep apnea admitted on 1/5/2019 with necrotic appearing right second toe with surrounding foot and leg erythema consistent with cellulitis and found to have osteomyelitis.    He was initially placed on broad-spectrum antibiotics that is subsequently been weaned down to ceftriaxone as strep is coming back as the only positive organism.  And he has been taken to the OR twice for successive amputations to ensure that the osteomyelitis has been completely removed.    His brief hospitalization has been complicated by acute mildly oliguric renal failure with an increase in his creatinine from approximately 1-3.0.  Suspect multifactorial in etiology: Acute infection, vancomycin, and suspected chronic underlying vulnerability.  His UO has significantly improved with IVF although creat continues to climb.    He is also having frequent diarrhea with associated abdominal pain     Problem List:   1. 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  Has had surgery on both January 6 and January 8.  Most recent path report showing inflammation consistent with partially treated osteomyelitis.  Appreciate both podiatry and infectious disease input.  Continue with ceftriaxone while in-house, likely can switch to  oral once pathology back improves no ongoing deep infection-discussed with Dr Zacarias today-when ready for discharge amox-clav for 10 days (renally adjusted if needed)  2. Acute kidney injury:  I suspect this is multifactorial including drug related vancomycin injury, prerenal azotemia and ACE inhibitor usage, and questionable infection related.  No evidence of significant urinary retention by bladder scanning. FeNA returns at 1.2 so likely a combo.  UO much improved with IVF, creat stabilizing now, cont with IVF, recheck creat in am, strict I/O   3. Intermittent right lower quadrant pain that spreads up and causes chest pain and associated shortness of breath: Present for over a year, and appears stable.  Etiology is unknown continue to monitor for now, given duration of symptoms doubt of significant clinical impact.  Of note- screening trop wnl even in setting of ABNER  4. Diarrhea:  Watery in nature with some associated abdominal pain but no fever and no wbc.  2 issues of concern-? c diff in setting of abx vs (more likely) abx associated diarrhea.  At risk for dehydration and decreased renal perfusion, therefore additional fluids overnight  5. Type 2 diabetes: Typically on use glargine 35 units daily, aspart 8 units 3 times daily pre-meal.  Continues on his pre-meal aspart as well as insulin sliding scale and blood sugars are inadequately controlled.  Will start glargine 5 unit(s) every day, and titrate as needed,was a little leary of jumping into home dosing in setting of ABNER-, continue aspart insulin sliding scale.  Blood sugars still on high side so will start 10 unit(s) qam   6. Coronary artery disease: Continue home , isosorbide, beta-blocker, and statin, and resume clopidogrel when clear no need for further interventions  7. Ischemic cardiomyopathy with most recent echocardiogram in this September 2018 showing an ejection fraction of 30-35%.  Baseline meds metoprolol XL at 50 mg p.o. nightly, lisinopril 20 mg  "p.o. twice daily, and isosorbide 30 mg p.o. Daily-hold ACE I in setting of ABNER  8. Chronic atrial fibrillation: Anticoagulated with apixaban  9. Hypertension: Prior to admission medications are amlodipine 5 mg p.o. daily, lisinopril 20 mg p.o. daily, isosorbide mononitrate 30 mg p.o. daily and metoprolol XL 50 mg p.o. daily.  All of been resumed except ACE inhibitor has been held in the setting of his acute kidney injury and BPs are under fair control  10. Hypothyroidism resumed home levothyroxine  DVT Prophylaxis: On apixaban  Code Status: Full Code    Disposition Plan   Expected discharge in 2-3 days to tbd once pathology back, antibiotic regimen in place, and PT eval completed     Entered: Meenu Catherine 01/11/2019, 4:14 PM               Interval History (Subjective):      Had a much better night last night and actually got some sleep, noticing that UO picking up.  No more abdominal/cp.  Po intake is ok.  No n/v.                   Physical Exam:      Last Vital Signs:  /60   Pulse 77   Temp 97.7  F (36.5  C) (Oral)   Resp 16   Ht 1.93 m (6' 4\")   Wt 113.4 kg (250 lb)   SpO2 96%   BMI 30.43 kg/m      I/O:  Not completely accurate, but UO picking up over past 36 hours    Pleasant chronically ill in appearance but no acute distress during my interview and exam and looks more animated and is more interactive today-so globally improved from yesterday  head is normocephalic atraumatic although somewhat disheveled lungs actually are pretty clear to auscultation he exhibits normal respiratory effort heart is of a regular rate and rhythm without murmurs rubs or gallops abdominal exam is currently soft nontender nondistended skin is warm and dry and there is no cyanosis or clubbing of the extremities affect is appropriate and he is moving all extremities.  Right foot remains wrapped and marking for his cellulitis appears dramatically improved           Medications:      All current medications were reviewed " with changes reflected in problem list.         Data:      All new lab and imaging data was reviewed.   Labs:  Recent Labs   Lab 01/11/19  1500      POTASSIUM 3.7   CHLORIDE 106   CO2 25   ANIONGAP 8   *   BUN 19   CR 3.30*   GFRESTIMATED 18*   GFRESTBLACK 21*   BETTIE 8.5     Recent Labs   Lab 01/11/19  0653   WBC 7.2   HGB 11.8*   HCT 36.4*   MCV 88        Recent Labs   Lab 01/11/19  1500 01/11/19  1149 01/11/19  0753 01/11/19  0653 01/11/19  0200 01/10/19  2117 01/10/19  1742 01/10/19  1443  01/10/19  0643  01/09/19  0722   *  --   --  181*  --   --   --  176*  --  218*  --  186*   BGM  --  277* 171*  --  151* 226* 171*  --    < >  --    < >  --     < > = values in this interval not displayed.      Imaging:

## 2019-01-12 ENCOUNTER — APPOINTMENT (OUTPATIENT)
Dept: ULTRASOUND IMAGING | Facility: CLINIC | Age: 69
DRG: 255 | End: 2019-01-12
Payer: COMMERCIAL

## 2019-01-12 ENCOUNTER — APPOINTMENT (OUTPATIENT)
Dept: GENERAL RADIOLOGY | Facility: CLINIC | Age: 69
DRG: 255 | End: 2019-01-12
Payer: COMMERCIAL

## 2019-01-12 LAB
ANION GAP SERPL CALCULATED.3IONS-SCNC: 6 MMOL/L (ref 3–14)
BASOPHILS # BLD AUTO: 0 10E9/L (ref 0–0.2)
BASOPHILS NFR BLD AUTO: 0.4 %
BUN SERPL-MCNC: 19 MG/DL (ref 7–30)
C DIFF TOX B STL QL: NEGATIVE
CALCIUM SERPL-MCNC: 8.2 MG/DL (ref 8.5–10.1)
CHLORIDE SERPL-SCNC: 109 MMOL/L (ref 94–109)
CO2 SERPL-SCNC: 25 MMOL/L (ref 20–32)
CREAT SERPL-MCNC: 3.28 MG/DL (ref 0.66–1.25)
DIFFERENTIAL METHOD BLD: ABNORMAL
EOSINOPHIL # BLD AUTO: 0.1 10E9/L (ref 0–0.7)
EOSINOPHIL NFR BLD AUTO: 1.8 %
ERYTHROCYTE [DISTWIDTH] IN BLOOD BY AUTOMATED COUNT: 13 % (ref 10–15)
GFR SERPL CREATININE-BSD FRML MDRD: 18 ML/MIN/{1.73_M2}
GLUCOSE BLDC GLUCOMTR-MCNC: 107 MG/DL (ref 70–99)
GLUCOSE BLDC GLUCOMTR-MCNC: 120 MG/DL (ref 70–99)
GLUCOSE BLDC GLUCOMTR-MCNC: 132 MG/DL (ref 70–99)
GLUCOSE BLDC GLUCOMTR-MCNC: 134 MG/DL (ref 70–99)
GLUCOSE BLDC GLUCOMTR-MCNC: 174 MG/DL (ref 70–99)
GLUCOSE SERPL-MCNC: 118 MG/DL (ref 70–99)
HCT VFR BLD AUTO: 37.3 % (ref 40–53)
HGB BLD-MCNC: 12.1 G/DL (ref 13.3–17.7)
IMM GRANULOCYTES # BLD: 0 10E9/L (ref 0–0.4)
IMM GRANULOCYTES NFR BLD: 0.3 %
LYMPHOCYTES # BLD AUTO: 0.8 10E9/L (ref 0.8–5.3)
LYMPHOCYTES NFR BLD AUTO: 12.1 %
MCH RBC QN AUTO: 28.9 PG (ref 26.5–33)
MCHC RBC AUTO-ENTMCNC: 32.4 G/DL (ref 31.5–36.5)
MCV RBC AUTO: 89 FL (ref 78–100)
MONOCYTES # BLD AUTO: 0.7 10E9/L (ref 0–1.3)
MONOCYTES NFR BLD AUTO: 10.1 %
NEUTROPHILS # BLD AUTO: 5.2 10E9/L (ref 1.6–8.3)
NEUTROPHILS NFR BLD AUTO: 75.3 %
NRBC # BLD AUTO: 0 10*3/UL
NRBC BLD AUTO-RTO: 0 /100
PLATELET # BLD AUTO: 236 10E9/L (ref 150–450)
POTASSIUM SERPL-SCNC: 3.6 MMOL/L (ref 3.4–5.3)
RBC # BLD AUTO: 4.19 10E12/L (ref 4.4–5.9)
SODIUM SERPL-SCNC: 140 MMOL/L (ref 133–144)
SPECIMEN SOURCE: NORMAL
WBC # BLD AUTO: 6.9 10E9/L (ref 4–11)

## 2019-01-12 PROCEDURE — 25000132 ZZH RX MED GY IP 250 OP 250 PS 637: Performed by: INTERNAL MEDICINE

## 2019-01-12 PROCEDURE — 25000128 H RX IP 250 OP 636: Performed by: INTERNAL MEDICINE

## 2019-01-12 PROCEDURE — 74019 RADEX ABDOMEN 2 VIEWS: CPT

## 2019-01-12 PROCEDURE — 80048 BASIC METABOLIC PNL TOTAL CA: CPT | Performed by: INTERNAL MEDICINE

## 2019-01-12 PROCEDURE — 25000131 ZZH RX MED GY IP 250 OP 636 PS 637: Performed by: INTERNAL MEDICINE

## 2019-01-12 PROCEDURE — 00000146 ZZHCL STATISTIC GLUCOSE BY METER IP

## 2019-01-12 PROCEDURE — 12000000 ZZH R&B MED SURG/OB

## 2019-01-12 PROCEDURE — 36415 COLL VENOUS BLD VENIPUNCTURE: CPT | Performed by: INTERNAL MEDICINE

## 2019-01-12 PROCEDURE — 85025 COMPLETE CBC W/AUTO DIFF WBC: CPT | Performed by: INTERNAL MEDICINE

## 2019-01-12 PROCEDURE — 99233 SBSQ HOSP IP/OBS HIGH 50: CPT | Performed by: INTERNAL MEDICINE

## 2019-01-12 PROCEDURE — 76770 US EXAM ABDO BACK WALL COMP: CPT

## 2019-01-12 RX ORDER — AMLODIPINE BESYLATE 10 MG/1
10 TABLET ORAL DAILY
Status: DISCONTINUED | OUTPATIENT
Start: 2019-01-13 | End: 2019-01-14 | Stop reason: HOSPADM

## 2019-01-12 RX ORDER — AMLODIPINE BESYLATE 5 MG/1
5 TABLET ORAL ONCE
Status: COMPLETED | OUTPATIENT
Start: 2019-01-12 | End: 2019-01-12

## 2019-01-12 RX ADMIN — VITAMIN D, TAB 1000IU (100/BT) 2000 UNITS: 25 TAB at 19:53

## 2019-01-12 RX ADMIN — PANTOPRAZOLE SODIUM 40 MG: 40 TABLET, DELAYED RELEASE ORAL at 08:54

## 2019-01-12 RX ADMIN — SIMVASTATIN 40 MG: 40 TABLET, FILM COATED ORAL at 21:36

## 2019-01-12 RX ADMIN — ONDANSETRON 4 MG: 4 TABLET, ORALLY DISINTEGRATING ORAL at 19:53

## 2019-01-12 RX ADMIN — INSULIN GLARGINE 10 UNITS: 100 INJECTION, SOLUTION SUBCUTANEOUS at 08:53

## 2019-01-12 RX ADMIN — AMLODIPINE BESYLATE 5 MG: 5 TABLET ORAL at 08:50

## 2019-01-12 RX ADMIN — ACETAMINOPHEN 650 MG: 325 TABLET, FILM COATED ORAL at 21:36

## 2019-01-12 RX ADMIN — APIXABAN 5 MG: 5 TABLET, FILM COATED ORAL at 08:50

## 2019-01-12 RX ADMIN — LEVOTHYROXINE SODIUM 137 MCG: 137 TABLET ORAL at 21:36

## 2019-01-12 RX ADMIN — CEFTRIAXONE SODIUM 2 G: 2 INJECTION, POWDER, FOR SOLUTION INTRAMUSCULAR; INTRAVENOUS at 08:57

## 2019-01-12 RX ADMIN — INSULIN ASPART 2 UNITS: 100 INJECTION, SOLUTION INTRAVENOUS; SUBCUTANEOUS at 18:26

## 2019-01-12 RX ADMIN — AMLODIPINE BESYLATE 5 MG: 5 TABLET ORAL at 12:14

## 2019-01-12 RX ADMIN — SODIUM CHLORIDE 2000 ML: 9 INJECTION, SOLUTION INTRAVENOUS at 14:56

## 2019-01-12 RX ADMIN — METOPROLOL SUCCINATE 50 MG: 50 TABLET, EXTENDED RELEASE ORAL at 21:36

## 2019-01-12 RX ADMIN — ISOSORBIDE MONONITRATE 30 MG: 30 TABLET, EXTENDED RELEASE ORAL at 08:50

## 2019-01-12 ASSESSMENT — ACTIVITIES OF DAILY LIVING (ADL)
ADLS_ACUITY_SCORE: 13

## 2019-01-12 ASSESSMENT — MIFFLIN-ST. JEOR: SCORE: 2041.78

## 2019-01-12 NOTE — PROGRESS NOTES
Pt is alert and oriented, lung sounds clear.+ve bowel sounds, passing flatus. Pt is up independently in the room, Denies loose stools tonight.Dressing is clean dry and intact.Denies N/V during this shift, voiding adequate amounts.Creatinine still elevated @ 3.3, continuing iv fluids tonight. Continues iv Rocephin. @2am.

## 2019-01-12 NOTE — PROGRESS NOTES
[facility]  Infectious Disease Progress Note          Assessment and Plan:   IMPRESSION:   1.  A 68-year-old male with acute right diabetic foot infection including second toe osteomyelitis, cultures, primarily strep, amputation done at this point.  No major clinical sepsis.   2.  Prior episode in the same foot of bacteremia with strep and osteomyelitis in a third toe earlier this year.   3.  Diabetes mellitus with neuropathy.   4.  Chronic left first metatarsal ulcer, no clear deep infection.  Podiatry following.     5.  Prior cerebrovascular infarction.   6.  Coronary artery disease.   7.  Chronic atrial fibrillation.   8 Acute renal failure 1/9, ? vanc0     RECOMMENDATIONS:   1.  Only strep in cx and now renal failure, cont  ceftriaxone  2.  On this occasion, assuming confident all major bone infection resected with no bacteremia, should be able to treat orally.     3.  Latest op noted, await path, follow foot, all signs no residual deep infection  4 Creat still 3, is urinating follow closely,when disposition ready augmentin 10 days, 875 daily if creat over 2               Interval History:   no new complaints and doing well; no cp, sob, n/v/d, or abd pain. Pain Ok, no fever BC neg only strep foot cx  Creat 3 still              Medications:       amLODIPine  5 mg Oral Daily     apixaban ANTICOAGULANT  5 mg Oral BID     cefTRIAXone  2 g Intravenous Q24H     insulin aspart  8 Units Subcutaneous TID w/meals     insulin aspart  1-10 Units Subcutaneous TID AC     insulin aspart  1-7 Units Subcutaneous At Bedtime     insulin glargine  10 Units Subcutaneous QAM AC     isosorbide mononitrate  30 mg Oral Daily     levothyroxine  137 mcg Oral At Bedtime     metoprolol succinate ER  50 mg Oral At Bedtime     pantoprazole  40 mg Oral QAM AC     simvastatin  40 mg Oral At Bedtime     sodium chloride (PF)  10 mL Intracatheter Q8H     vitamin D3  2,000 Units Oral QPM                  Physical Exam:  "  Blood pressure (!) 168/101, pulse 77, temperature 97.2  F (36.2  C), temperature source Oral, resp. rate 16, height 1.93 m (6' 4\"), weight 117 kg (258 lb), SpO2 95 %.  Wt Readings from Last 2 Encounters:   01/12/19 117 kg (258 lb)   09/06/18 115.1 kg (253 lb 11.2 oz)     Vital Signs with Ranges  Temp:  [95.9  F (35.5  C)-98.6  F (37  C)] 97.2  F (36.2  C)  Pulse:  [77] 77  Heart Rate:  [65-77] 77  Resp:  [16] 16  BP: (137-170)/() 168/101  SpO2:  [93 %-96 %] 95 %    Constitutional: Awake, alert, cooperative, no apparent distress   Lungs: Clear to auscultation bilaterally, no crackles or wheezing   Cardiovascular: Regular rate and rhythm, normal S1 and S2, and no murmur noted   Abdomen: Normal bowel sounds, soft, non-distended, non-tender   Skin: No rashes, no cyanosis, same edema sl cellulitis, foot wrapped   Other:           Data:   All microbiology laboratory data reviewed.  Recent Labs   Lab Test 01/12/19  0729 01/11/19  0653 01/09/19  0722   WBC 6.9 7.2 7.9   HGB 12.1* 11.8* 12.4*   HCT 37.3* 36.4* 37.1*   MCV 89 88 87    238 246     Recent Labs   Lab Test 01/12/19  0729 01/11/19  1500 01/11/19  0653   CR 3.28* 3.30* 3.13*     Recent Labs   Lab Test 09/08/18  0614   SED 15     Recent Labs   Lab Test 01/06/19  1436 01/05/19  1708 01/05/19  1558 09/09/18  1533 09/08/18  0613 09/06/18  1550 09/06/18  1545 09/06/18  1541 03/03/18  1936   CULT Heavy growth  Finegoldia magna (Peptostreptococcus jennifer)  Susceptibility testing not routinely done  *  Heavy growth  Streptococcus dysgalactiae serogroup C/G  *  Moderate growth  Staphylococcus lugdunensis  *  Light growth  Streptococcus dysgalactiae serogroup C/G  Susceptibility testing done on previous specimen  *  Single colony  Coagulase negative Staphylococcus  Susceptibility testing not routinely done  These bacteria are part of normal skin michael, but on occasion, may be true pathogens.    Clinical correlation must be applied to interpreting this " microbiology result.  *  Culture negative monitoring continues No growth No growth No growth No growth Cultured on the 1st day of incubation:  Streptococcus agalactiae sero group B  *  Critical Value/Significant Value, preliminary result only, called to and read back by  Teresa John RN on [email or web address] @1055 on 9/7/18. ch.    (Note)  POSITIVE for STREPTOCOCCUS AGALACTIAE (Group B Strep) by [employer] UD  multiplex nucleic acid test. Penicillin and ampicillin are drugs of  choice, nonsusceptible isolates have not been reported. Final  identification and antimicrobial susceptibility testing will be  verified by standard methods.    Specimen tested with [employer] multiplex, gram-positive blood culture  nucleic acid test for the following targets: Staph aureus, Staph  epidermidis, Staph lugdunensis, other Staph species, Enterococcus  faecalis, Enterococcus faecium, Streptococcus species, S. agalactiae,  S. anginosus grp., S. pneumoniae, S. pyogenes, Listeria sp., mecA  (methicillin resistance) and Elisha/B (vancomycin resistance).    Critical Value/Significant Value called to and read back by Teresa John RN on 9.7.18 at 1340. bw     Cultured on the 1st day of incubation:  Streptococcus agalactiae sero group B  Susceptibility testing done on previous specimen  *  Critical Value/Significant Value, preliminary result only, called to and read back by  Ofe Fernandes RN on [email or web address] @1515 on 9/7/18. ch.   Heavy growth  Staphylococcus aureus  *  Heavy growth  Beta hemolytic Streptococcus group B  *  Moderate growth  Normal skin michael    No growth No growth

## 2019-01-12 NOTE — PLAN OF CARE
Alert and oriented. HTN this shift, MD aware. Gave additional dose Norvasc. Tolerating high CHO diet, monitoring BG levels. Up independently in room, heel touch WB to RLE with offloading shoe. Dressing CDI, baseline numbness in BLE. Mild edema in BLE. Voiding in adequate amounts. One loose stool this afternoon. Denies pain. IVF restarted this afternoon. Midline patent. On IV Rocephin. Plans for renal US and abdominal Xray, will continue to monitor.

## 2019-01-12 NOTE — PROGRESS NOTES
[facility]  Hospitalist Progress Note  Meenu Catherine MD 01/12/2019    Reason for Stay (Diagnosis): Osteomyelitis         Assessment and Plan:      Summary of Stay: Jayro Elder is a 68 year old male with a history of type 2 diabetes complicated by prior diabetic foot infections, coronary artery disease with stents, and associated ischemic cardiomyopathy with most recent echocardiogram completed 9/2018 showing an ejection fraction of 35-40% with indeterminate diastolic dysfunction, stress testing completed on 9/14/2018 showing a fixed large mid to distal anterior and anteroseptal defect, atrial fibrillation on chronic apixaban, untreated obstructive sleep apnea admitted on 1/5/2019 with necrotic appearing right second toe with surrounding foot and leg erythema consistent with cellulitis and found to have osteomyelitis.    He was initially placed on broad-spectrum antibiotics that is subsequently been weaned down to ceftriaxone as strep is coming back as the only positive organism.  And he has been taken to the OR twice for successive amputations to ensure that the osteomyelitis has been completely removed.    His brief hospitalization has been complicated by acute mildly oliguric renal failure with an increase in his creatinine from approximately 1->3.0.  Suspect multifactorial in etiology: Acute infection, vancomycin, and suspected chronic underlying vulnerability.  His UO has significantly improved with IVF although creat continues to climb.    Problem List:   1. 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  Has had surgery on both January 6 and January 8.  Most recent path report showing inflammation consistent with partially treated osteomyelitis.  Appreciate both podiatry and infectious disease input.  Continue with ceftriaxone while in-house, likely can switch to oral once pathology back improves no ongoing deep infection-discussed  with Dr Zacarias today-when ready for discharge amox-clav for 10 days (renally adjusted if needed)  2. Acute kidney injury:  I suspect this is multifactorial including drug related vancomycin injury, prerenal azotemia and ACE inhibitor usage, and questionable infection related.  No evidence of significant urinary retention by bladder scanning. FeNA returns at 1.2 so likely a combo.  UO much improved with IVF, creat stabilizing now, cont with IVF, recheck creat in am, strict I/O   3. Intermittent right lower quadrant pain that spreads up and causes chest pain and associated shortness of breath: Present for over a year, and appears stable.  Etiology is unknown continue to monitor for now, given duration of symptoms doubt of significant clinical impact.  Of note- screening trop wnl even in setting of ABNER  4. Diarrhea:  Watery in nature with some associated abdominal pain but no fever and no wbc.  2 issues of concern-? c diff in setting of abx vs (more likely) abx associated diarrhea.  C diff negative, diarrhea resolved  5. Type 2 diabetes: Typically on use glargine 35 units daily, aspart 8 units 3 times daily pre-meal.  Continues on his pre-meal aspart as well as insulin sliding scale and blood sugars are inadequately controlled.  Will start glargine 5 unit(s) every day, and titrated up to 10 unit(s) 1/12, was a little leary of jumping into home dosing in setting of ABNER-, continue aspart insulin sliding scale.    6. Coronary artery disease: Continue home , isosorbide, beta-blocker, and statin, and resume clopidogrel when clear no need for further interventions  7. Ischemic cardiomyopathy with most recent echocardiogram in this September 2018 showing an ejection fraction of 30-35%.  Baseline meds metoprolol XL at 50 mg p.o. nightly, lisinopril 20 mg p.o. twice daily, and isosorbide 30 mg p.o. Daily-hold ACE I in setting of ABNER  8. Chronic atrial fibrillation: Anticoagulated with apixaban- I held this today as not  "considered evaluated in creat > 2.5.    9. Hypertension: Prior to admission medications are amlodipine 5 mg p.o. daily, lisinopril 20 mg p.o. daily, isosorbide mononitrate 30 mg p.o. daily and metoprolol XL 50 mg p.o. daily.  All of been resumed except ACE inhibitor has been held in the setting of his acute kidney injury and BPs are under fair control  10. Hypothyroidism resumed home levothyroxine  DVT Prophylaxis: On apixaban at baseline, I have held that in light of his creat > 2.5 will resume when creat trends towards normal   Code Status: Full Code    Disposition Plan   Expected discharge in 2-3 days to tbd once pathology back, antibiotic regimen in place, and PT eval completed     Entered: Meenu Catherine 01/12/2019, 4:59 PM         Interval History (Subjective):      Had a much better night last night and actually got some sleep, noticing that UO picking up.  No more abdominal/cp.  Po intake is ok.  No n/v.                   Physical Exam:      Last Vital Signs:  /89   Pulse 77   Temp 98.4  F (36.9  C) (Oral)   Resp 18   Ht 1.93 m (6' 4\")   Wt 117 kg (258 lb)   SpO2 92%   BMI 31.40 kg/m      I/O:  Not completely accurate, but UO continues to pic up over past 48 hours    Pleasant chronically ill in appearance but no acute distress during my interview and exam and looks more animated and is more interactive today-so globally improved from yesterday head is normocephalic atraumatic although somewhat disheveled lungs actually are pretty clear to auscultation he exhibits normal respiratory effort heart is of a regular rate and rhythm without murmurs rubs or gallops abdominal exam is currently soft nontender nondistended skin is warm and dry and there is no cyanosis or clubbing of the extremities affect is appropriate and he is moving all extremities.  Right foot remains wrapped and marking for his cellulitis appears dramatically improved           Medications:      All current medications were reviewed " with changes reflected in problem list.         Data:      All new lab and imaging data was reviewed.   Labs:  Recent Labs   Lab 01/12/19  0729      POTASSIUM 3.6   CHLORIDE 109   CO2 25   ANIONGAP 6   *   BUN 19   CR 3.28*   GFRESTIMATED 18*   GFRESTBLACK 21*   BETTIE 8.2*     Recent Labs   Lab 01/12/19  0729   WBC 6.9   HGB 12.1*   HCT 37.3*   MCV 89        Recent Labs   Lab 01/12/19  1236 01/12/19  0729 01/12/19  0724 01/12/19  0153 01/11/19  2152 01/11/19  1710 01/11/19  1500  01/11/19  0653  01/10/19  1443  01/10/19  0643   GLC  --  118*  --   --   --   --  207*  --  181*  --  176*  --  218*   *  --  120* 107* 123* 139*  --    < >  --    < >  --    < >  --     < > = values in this interval not displayed.      Imaging:

## 2019-01-12 NOTE — PROVIDER NOTIFICATION
"Pt reported that he had been having a \"gut ache\" for the past couple days as well as loose stools.  Reported that he felt like he had a stool almost every time he used the bathroom today.  Page sent to Dr. Catherine to notify and request any further orders.   "

## 2019-01-12 NOTE — CONSULTS
RENAL CONSULTATION NOTE    REFERRING MD:  Meenu Catherine MD    REASON FOR CONSULTATION:  Acute Kidney injury    HPI:  68 y.o man with  CAD, ICM with EF of 35-40%, uncontrolled DM and hypertension, who was admitted on 1/5 for RLE, found to have have osteomyelitis and gangrene of the right 2nd toe. The toe was amputated on 1/6, revision and I&D on 1/8. Wound  Culture grew streptococcus. He had vancomycin from 1/5-8, Zosyn from 1/5-9 and  Rocephin started on 1/9.     He had a serum creatinine of  0.87 mg/dl on 1/7-->2 mg/dl on 1/9, and it has been around 3.2 mg/dl since 1/10.     No IV contrast or NSAIDs exposure.     ROS:  A complete review of systems was performed and is negative except as noted above.    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     Cardiomyopathy (H)      Cellulitis of left lower extremity 3/13/2018     Cerebral infarction (H)     eye sight decreased in peripheral of right side and blurry     Diabetic ulcer of left midfoot associated with type 2 diabetes mellitus, with fat layer exposed (H) 3/13/2018     Essential hypertension, benign 11/13/2002     Generalized osteoarthrosis, unspecified site 11/13/2002     Microalbuminuria 3/13/2018    X1     Myocardial infarction (H)      Neuropathy      Sepsis (H)      Sleep apnea     doesn't use it     Substance abuse (H)      Syncope, unspecified syncope type 3/13/2018     Thyroid disease     takes medicine     Tobacco use disorder 11/13/2002     Type 2 diabetes mellitus with diabetic peripheral angiopathy without gangrene, unspecified long term insulin use status 3/13/2018     Type II or unspecified type diabetes mellitus without mention of complication, not stated as uncontrolled 7/14/2005       PSH:    Past Surgical History:   Procedure Laterality Date     AMPUTATE TOE(S) Right 1/6/2019    Procedure: Right  open second toe partial amputation;  Surgeon: Sabino Garcia DPM;  Location: [facility] OR     AMPUTATE TOE(S) Right 1/8/2019    Procedure: 1.  Revision right second toe amputation with resection of distal half of proximal phalanx with full closure.    2.  Debridement of callus/preulcerative lesion, distal left second toe and plantar left first metatarsophalangeal joint.;  Surgeon: Ryan Bhagat DPM;  Location: [facility] OR     ANGIOGRAM  6/29/05    subtotal occ.mid LAD,SUSAN mid LAD     ANGIOGRAM  7/05    mild CAD,patent stent,no flow-limiting lesions,sev.LV dysf.LAD enlarged     ANGIOGRAM  2/09    Sev.single vessel disease,Mod LV dysf.distal LAD 90%,70-75% mid lad just before prev stent,SUSAN to prox.mid LAD, endeavor to distal LAD     ANGIOGRAM  11/13/13    restenosis, stent LAD     BACK SURGERY      back surgery x3     C NONSPECIFIC PROCEDURE      Laminectomy x 3 - (1983 x 2 & 1990)     C NONSPECIFIC PROCEDURE Bilateral 1998    Bunionectomy     C NONSPECIFIC PROCEDURE  1959    Gingival surgery at age 9     HEART CATH LEFT HEART CATH  06/13/2017    SUSAN to OM3     INCISION AND DRAINAGE TOE, COMBINED Bilateral 9/11/2018    Procedure: COMBINED INCISION AND DRAINAGE TOE;  1) Irrigation and debridement ulcer left great toe  2) Amputation 3rd toe right foot at distal interphalangeal joint level;  Surgeon: Miki Harp MD;  Location: [facility] OR     ORTHOPEDIC SURGERY      bunion surgery both feet     ORTHOPEDIC SURGERY      left shoulder     SOFT TISSUE SURGERY      debridement of toe numerous time     VASCULAR SURGERY      7 stents in heart       MEDICATIONS:      [START ON 1/13/2019] amLODIPine  10 mg Oral Daily     apixaban ANTICOAGULANT  5 mg Oral BID     cefTRIAXone  2 g Intravenous Q24H     insulin aspart  8 Units Subcutaneous TID w/meals     insulin aspart  1-10 Units Subcutaneous TID AC     insulin aspart  1-7 Units Subcutaneous At Bedtime     insulin glargine  10 Units Subcutaneous QAM AC     isosorbide  mononitrate  30 mg Oral Daily     levothyroxine  137 mcg Oral At Bedtime     metoprolol succinate ER  50 mg Oral At Bedtime     pantoprazole  40 mg Oral QAM AC     simvastatin  40 mg Oral At Bedtime     sodium chloride (PF)  10 mL Intracatheter Q8H     vitamin D3  2,000 Units Oral QPM       ALLERGIES:    Allergies as of 2019 - Review Complete 2019   Allergen Reaction Noted     No known allergies  2004       FH:    Family History   Problem Relation Age of Onset     Cancer - colorectal Sister      Thyroid Disease Brother      Cardiovascular Brother      Diabetes Brother      Cancer Father         tumor in chest and throat     Arthritis Mother      Thyroid Disease Mother      Diabetes Mother      Glaucoma No family hx of      Macular Degeneration No family hx of        SH:    Social History     Socioeconomic History     Marital status:      Spouse name: Not on file     Number of children: Not on file     Years of education: Not on file     Highest education level: Not on file   Social Needs     Financial resource strain: Not on file     Food insecurity - worry: Not on file     Food insecurity - inability: Not on file     Transportation needs - medical: Not on file     Transportation needs - non-medical: Not on file   Occupational History     Not on file   Tobacco Use     Smoking status: Former Smoker     Packs/day: 1.00     Years: 25.00     Pack years: 25.00     Types: Cigarettes     Last attempt to quit: 2005     Years since quittin.4     Smokeless tobacco: Never Used   Substance and Sexual Activity     Alcohol use: Yes     Alcohol/week: 2.4 oz     Types: 4 Shots of liquor per week     Comment: almost every day     Drug use: No     Sexual activity: Yes     Partners: Female   Other Topics Concern     Parent/sibling w/ CABG, MI or angioplasty before 65F 55M? Yes      Service Not Asked     Blood Transfusions Not Asked     Caffeine Concern No     Comment: 3 cups daily      "Occupational Exposure Not Asked     Hobby Hazards Not Asked     Sleep Concern Not Asked     Stress Concern Not Asked     Weight Concern Not Asked     Special Diet Yes     Comment: watching carb intake     Back Care Not Asked     Exercise No     Comment: some walking     Bike Helmet Not Asked     Seat Belt Not Asked     Self-Exams Not Asked   Social History Narrative     Not on file       PHYSICAL EXAM:    BP (!) 147/93 (BP Location: Left arm)   Pulse 77   Temp 97.2  F (36.2  C) (Oral)   Resp 16   Ht 1.93 m (6' 4\")   Wt 117 kg (258 lb)   SpO2 95%   BMI 31.40 kg/m    GENERAL: NAD  HEENT:  Normocephalic. No gross abnormalities.  Pupils equal.  MMM.    CV: RRR, no murmurs, no clicks, gallops, or rubs, 2-3+ RLE edema, trace LLE edema  RESP: Clear bilaterally with good efforts. No wheezes or crackles.  GI: Abdomen obese, soft, NT  MUSCULOSKELETAL: 2-3+ RLE edema, trace LLE edema.  R foot is wrapped.  SKIN: no suspicious lesions or rashes, dry to touch  NEURO:  Strength normal and symmetric. A/xo 3. Grossly intact.  PSYCH: mood good, affect appropriate  LYMPH: No palpable ant/post cervical     LABS:      CBC RESULTS:     Recent Labs   Lab 01/12/19  0729 01/11/19  0653 01/09/19  0722 01/07/19  0727 01/06/19  0652 01/05/19  1559   WBC 6.9 7.2 7.9 5.6 6.9 7.1   RBC 4.19* 4.13* 4.29* 4.12* 4.16* 4.56   HGB 12.1* 11.8* 12.4* 12.1* 11.9* 13.2*   HCT 37.3* 36.4* 37.1* 35.8* 36.0* 39.6*    238 246 189 182 182       BMP RESULTS:  Recent Labs   Lab 01/12/19  0729 01/11/19  1500 01/11/19  0653 01/10/19  1443 01/10/19  0643 01/09/19  0722    139 141 138 138 139   POTASSIUM 3.6 3.7 3.7 3.6 3.8 3.8   CHLORIDE 109 106 108 103 104 104   CO2 25 25 24 26 25 27   BUN 19 19 18 18 18 12   CR 3.28* 3.30* 3.13* 3.18* 3.00* 2.03*   * 207* 181* 176* 218* 186*   BETTIE 8.2* 8.5 8.5 8.4* 8.3* 8.8       INRNo lab results found in last 7 days.     DIAGNOSTICS:  Reviewed    A/P:  68 y.o man who was admitted on 1/5 for second " right toe gangrene and osteomyelitis.     # He had a serum creatinine of  0.87 mg/dl on 1/7-->2 mg/dl on 1/9, and it has been around 3.2 mg/dl since 1/10.     # Acute kidney injury. Scr has plateau and seems to be trending down.     # 2nd R toe osteomyelitis s/p amputation. On Rocephin     # CAD, ICM with EF 35-40%. No cardiopulmonary complaints.      # Hypertension. Blood pressure is high. ACEi or ARB when kidney function returns to baseline.     # Uncontrolled  DM. A1C is 11.75.  Needs to do a better job with this.     Plan.  # Daily renal panel  #Avoid contrast and  NSAIDs  # SCr should continue to trend down    Lowell Davila MD  [facility] - Nephrology  Office Phone: [phone]  Pager: [phone]

## 2019-01-12 NOTE — PLAN OF CARE
Pt A/O.  Frustrated with having to stay another night, but still pleasant and cooperative. Dressing D/I to right foot.  Denies any new N/T besides at baseline.  Pt up in room independently-uses post-op shoe to RLE when up.  Given Zofran x1 for nausea/abdominal discomfort as well as Tylenol.  Had mucous, watery stool this evening-sent for C-Diff sample-results pending.  Voiding well. Creatinine trended back up to 3.3-IV fluids started again this evening.  Encouraged PO fluids as able. Did eat dinner tonight, but reports not having much of an appetite.   Continues on IV Rocephin.  Will continue to monitor.  Discharge plan pending.

## 2019-01-13 LAB
ALBUMIN SERPL-MCNC: 2.7 G/DL (ref 3.4–5)
ALP SERPL-CCNC: 66 U/L (ref 40–150)
ALT SERPL W P-5'-P-CCNC: 15 U/L (ref 0–70)
ANION GAP SERPL CALCULATED.3IONS-SCNC: 10 MMOL/L (ref 3–14)
AST SERPL W P-5'-P-CCNC: 10 U/L (ref 0–45)
BACTERIA SPEC CULT: ABNORMAL
BASOPHILS # BLD AUTO: 0 10E9/L (ref 0–0.2)
BASOPHILS NFR BLD AUTO: 0.4 %
BILIRUB SERPL-MCNC: 0.5 MG/DL (ref 0.2–1.3)
BUN SERPL-MCNC: 18 MG/DL (ref 7–30)
CALCIUM SERPL-MCNC: 8 MG/DL (ref 8.5–10.1)
CHLORIDE SERPL-SCNC: 109 MMOL/L (ref 94–109)
CO2 SERPL-SCNC: 23 MMOL/L (ref 20–32)
CREAT SERPL-MCNC: 3.45 MG/DL (ref 0.66–1.25)
DIFFERENTIAL METHOD BLD: ABNORMAL
EOSINOPHIL # BLD AUTO: 0.1 10E9/L (ref 0–0.7)
EOSINOPHIL NFR BLD AUTO: 1.8 %
ERYTHROCYTE [DISTWIDTH] IN BLOOD BY AUTOMATED COUNT: 12.9 % (ref 10–15)
GFR SERPL CREATININE-BSD FRML MDRD: 17 ML/MIN/{1.73_M2}
GLUCOSE BLDC GLUCOMTR-MCNC: 109 MG/DL (ref 70–99)
GLUCOSE BLDC GLUCOMTR-MCNC: 122 MG/DL (ref 70–99)
GLUCOSE BLDC GLUCOMTR-MCNC: 204 MG/DL (ref 70–99)
GLUCOSE BLDC GLUCOMTR-MCNC: 85 MG/DL (ref 70–99)
GLUCOSE SERPL-MCNC: 118 MG/DL (ref 70–99)
HCT VFR BLD AUTO: 36.5 % (ref 40–53)
HGB BLD-MCNC: 11.6 G/DL (ref 13.3–17.7)
IMM GRANULOCYTES # BLD: 0 10E9/L (ref 0–0.4)
IMM GRANULOCYTES NFR BLD: 0.3 %
LYMPHOCYTES # BLD AUTO: 0.9 10E9/L (ref 0.8–5.3)
LYMPHOCYTES NFR BLD AUTO: 12.6 %
Lab: ABNORMAL
MCH RBC QN AUTO: 28.4 PG (ref 26.5–33)
MCHC RBC AUTO-ENTMCNC: 31.8 G/DL (ref 31.5–36.5)
MCV RBC AUTO: 90 FL (ref 78–100)
MONOCYTES # BLD AUTO: 0.8 10E9/L (ref 0–1.3)
MONOCYTES NFR BLD AUTO: 11.4 %
NEUTROPHILS # BLD AUTO: 5 10E9/L (ref 1.6–8.3)
NEUTROPHILS NFR BLD AUTO: 73.5 %
NRBC # BLD AUTO: 0 10*3/UL
NRBC BLD AUTO-RTO: 0 /100
PLATELET # BLD AUTO: 241 10E9/L (ref 150–450)
POTASSIUM SERPL-SCNC: 3.6 MMOL/L (ref 3.4–5.3)
PROT SERPL-MCNC: 6 G/DL (ref 6.8–8.8)
RBC # BLD AUTO: 4.08 10E12/L (ref 4.4–5.9)
SODIUM SERPL-SCNC: 142 MMOL/L (ref 133–144)
SPECIMEN SOURCE: ABNORMAL
WBC # BLD AUTO: 6.8 10E9/L (ref 4–11)

## 2019-01-13 PROCEDURE — 25000128 H RX IP 250 OP 636: Performed by: INTERNAL MEDICINE

## 2019-01-13 PROCEDURE — 25000131 ZZH RX MED GY IP 250 OP 636 PS 637: Performed by: INTERNAL MEDICINE

## 2019-01-13 PROCEDURE — 85025 COMPLETE CBC W/AUTO DIFF WBC: CPT | Performed by: INTERNAL MEDICINE

## 2019-01-13 PROCEDURE — 80053 COMPREHEN METABOLIC PANEL: CPT | Performed by: INTERNAL MEDICINE

## 2019-01-13 PROCEDURE — 25000132 ZZH RX MED GY IP 250 OP 250 PS 637: Performed by: INTERNAL MEDICINE

## 2019-01-13 PROCEDURE — 99233 SBSQ HOSP IP/OBS HIGH 50: CPT | Performed by: INTERNAL MEDICINE

## 2019-01-13 PROCEDURE — 12000000 ZZH R&B MED SURG/OB

## 2019-01-13 PROCEDURE — 00000146 ZZHCL STATISTIC GLUCOSE BY METER IP

## 2019-01-13 PROCEDURE — 36415 COLL VENOUS BLD VENIPUNCTURE: CPT | Performed by: INTERNAL MEDICINE

## 2019-01-13 RX ADMIN — ISOSORBIDE MONONITRATE 30 MG: 30 TABLET, EXTENDED RELEASE ORAL at 08:14

## 2019-01-13 RX ADMIN — METOPROLOL SUCCINATE 50 MG: 50 TABLET, EXTENDED RELEASE ORAL at 21:18

## 2019-01-13 RX ADMIN — SIMVASTATIN 40 MG: 40 TABLET, FILM COATED ORAL at 21:18

## 2019-01-13 RX ADMIN — PANTOPRAZOLE SODIUM 40 MG: 40 TABLET, DELAYED RELEASE ORAL at 08:14

## 2019-01-13 RX ADMIN — AMLODIPINE BESYLATE 10 MG: 10 TABLET ORAL at 08:14

## 2019-01-13 RX ADMIN — Medication 1 MG: at 01:54

## 2019-01-13 RX ADMIN — CEFTRIAXONE SODIUM 2 G: 2 INJECTION, POWDER, FOR SOLUTION INTRAMUSCULAR; INTRAVENOUS at 09:12

## 2019-01-13 RX ADMIN — INSULIN GLARGINE 10 UNITS: 100 INJECTION, SOLUTION SUBCUTANEOUS at 08:18

## 2019-01-13 RX ADMIN — VITAMIN D, TAB 1000IU (100/BT) 2000 UNITS: 25 TAB at 21:18

## 2019-01-13 RX ADMIN — LEVOTHYROXINE SODIUM 137 MCG: 137 TABLET ORAL at 21:18

## 2019-01-13 RX ADMIN — Medication 1 MG: at 23:58

## 2019-01-13 ASSESSMENT — ACTIVITIES OF DAILY LIVING (ADL)
ADLS_ACUITY_SCORE: 13

## 2019-01-13 NOTE — PROGRESS NOTES
" Renal Medicine Progress Note            Assessment/Plan:     68 y.o man who was admitted on 1/5 for second right toe gangrene and osteomyelitis.      # He had a serum creatinine of  0.87 mg/dl on 1/7-->2 mg/dl on 1/9, and it has been around 3.2--3.4 mg/dl since 1/10.      # Acute kidney injury, likely ATN. Seems to plateau. Good urine output. No hydro on ultrasound.     # 2nd R toe osteomyelitis s/p amputation. On Rocephin      # CAD, ICM with EF 35-40%. No cardiopulmonary complaints.       # Hypertension. Blood pressure is high. ACEi or ARB when kidney function returns to baseline.      # Uncontrolled  DM. A1C is 11.75.  Needs to do a better job with this.      Plan.  # Hopefully, Scr will start to trend down in 1-2 days        Interval History:     Pt is eating dinner. He looks good. No complain of shortness of breath. Afebrile. BP is high.           Medications and Allergies:       amLODIPine  10 mg Oral Daily     cefTRIAXone  2 g Intravenous Q24H     insulin aspart  8 Units Subcutaneous TID w/meals     insulin aspart  1-10 Units Subcutaneous TID AC     insulin aspart  1-7 Units Subcutaneous At Bedtime     insulin glargine  10 Units Subcutaneous QAM AC     isosorbide mononitrate  30 mg Oral Daily     levothyroxine  137 mcg Oral At Bedtime     metoprolol succinate ER  50 mg Oral At Bedtime     pantoprazole  40 mg Oral QAM AC     simvastatin  40 mg Oral At Bedtime     sodium chloride (PF)  10 mL Intracatheter Q8H     vitamin D3  2,000 Units Oral QPM        Allergies   Allergen Reactions     No Known Allergies             Physical Exam:   Vitals were reviewed  Heart Rate: 79, Blood pressure (!) 174/96, pulse 77, temperature 98  F (36.7  C), temperature source Oral, resp. rate 16, height 1.93 m (6' 4\"), weight 117 kg (258 lb), SpO2 94 %.    Wt Readings from Last 3 Encounters:   01/12/19 117 kg (258 lb)   09/06/18 115.1 kg (253 lb 11.2 oz)   03/12/18 114.3 kg (252 lb)       Intake/Output Summary (Last 24 hours) at " 1/13/2019 1738  Last data filed at 1/13/2019 1400  Gross per 24 hour   Intake 2829 ml   Output 1620 ml   Net 1209 ml       GENERAL APPEARANCE: NAD  HEENT:  Eyes/ears/nose/neck grossly normal  RESP: lungs cta b c good efforts, no crackles, rhonchi or wheezes  CV: RRR, nl S1/S2, no  m/r/g   ABDOMEN: o/s/nt/nd, bs present  EXTREMITIES/SKIN: no rashes/lesions on observed skin; <1+ edema  Neuro; a/o x 3         Data:     CBC RESULTS:     Recent Labs   Lab 01/13/19  0702 01/12/19  0729 01/11/19  0653 01/09/19  0722 01/07/19  0727   WBC 6.8 6.9 7.2 7.9 5.6   RBC 4.08* 4.19* 4.13* 4.29* 4.12*   HGB 11.6* 12.1* 11.8* 12.4* 12.1*   HCT 36.5* 37.3* 36.4* 37.1* 35.8*    236 238 246 189       Basic Metabolic Panel:  Recent Labs   Lab 01/13/19  0702 01/12/19  0729 01/11/19  1500 01/11/19  0653 01/10/19  1443 01/10/19  0643    140 139 141 138 138   POTASSIUM 3.6 3.6 3.7 3.7 3.6 3.8   CHLORIDE 109 109 106 108 103 104   CO2 23 25 25 24 26 25   BUN 18 19 19 18 18 18   CR 3.45* 3.28* 3.30* 3.13* 3.18* 3.00*   * 118* 207* 181* 176* 218*   BETTIE 8.0* 8.2* 8.5 8.5 8.4* 8.3*       INRNo lab results found in last 7 days.   Attestation:   I have reviewed today's relevant vital signs, notes, medications, labs and imaging.    Lowell Davila MD  [facility] - [facility]phrology  Office phone :[phone]  Pager: [phone]

## 2019-01-13 NOTE — PROGRESS NOTES
[facility]  Hospitalist Progress Note  Meenu Catherine MD 01/13/2019    Reason for Stay (Diagnosis): Osteomyelitis         Assessment and Plan:      Summary of Stay: Jayro Elder is a 68 year old male with a history of type 2 diabetes complicated by prior diabetic foot infections, coronary artery disease with stents, and associated ischemic cardiomyopathy with most recent echocardiogram completed 9/2018 showing an ejection fraction of 35-40% with indeterminate diastolic dysfunction, stress testing completed on 9/14/2018 showing a fixed large mid to distal anterior and anteroseptal defect, atrial fibrillation on chronic apixaban, untreated obstructive sleep apnea admitted on 1/5/2019 with necrotic appearing right second toe with surrounding foot and leg erythema consistent with cellulitis and found to have osteomyelitis.    He was initially placed on broad-spectrum antibiotics that is subsequently been weaned down to ceftriaxone as strep is coming back as the only positive organism.  And he has been taken to the OR twice for successive amputations to ensure that the osteomyelitis has been completely removed.    His brief hospitalization has been complicated by acute mildly oliguric renal failure with an increase in his creatinine from approximately 1->3.0.  Suspect multifactorial in etiology: Acute infection, vancomycin, and suspected chronic underlying vulnerability.  His UO has significantly improved with IVF although creat continues to climb.    Problem List:   1. 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  Has had surgery on both January 6 and January 8.  Most recent path report showing inflammation consistent with partially treated osteomyelitis.  Appreciate both podiatry and infectious disease input.  Continue with ceftriaxone while in-house, likely can switch to oral once pathology back improves no ongoing deep infection-discussed  with Dr Zacarias 2 days ago-when ready for discharge amox-clav for 10 days (renally adjusted if needed)  2. Acute kidney injury:  I suspect this is multifactorial including drug related vancomycin injury, prerenal azotemia and ACE inhibitor usage, and questionable infection related.  No evidence of significant urinary retention by bladder scanning, renal US normal. FeNA returns at 1.2 so likely a combo.  UO much improved with IVF, creat stabilizing now although still slowly trending in wrong direction.  Discussed with Dr Davila who recommends no more IVF.    3. Intermittent right lower quadrant pain that spreads up and causes chest pain and associated shortness of breath: Present for over a year, and appears stable.  Etiology is unknown continue to monitor for now, given duration of symptoms doubt of significant clinical impact.  Of note- screening trop wnl even in setting of ABNER  4. Diarrhea:  Watery in nature with some associated abdominal pain but no fever and no wbc.  2 issues of concern-? c diff in setting of abx vs (more likely) abx associated diarrhea.  C diff negative, diarrhea resolved  5. Type 2 diabetes: Typically on use glargine 35 units daily, aspart 8 units 3 times daily pre-meal.  Continues on his pre-meal aspart as well as insulin sliding scale and blood sugars are inadequately controlled.  Will start glargine 5 unit(s) every day, and titrated up to 10 unit(s) 1/12, was a little leary of jumping into home dosing in setting of ABNER-, continue aspart insulin sliding scale.    6. Coronary artery disease: Continue home , isosorbide, beta-blocker, and statin, and resume clopidogrel when clear no need for further interventions  7. Ischemic cardiomyopathy with most recent echocardiogram in this September 2018 showing an ejection fraction of 30-35%.  Baseline meds metoprolol XL at 50 mg p.o. nightly, lisinopril 20 mg p.o. twice daily, and isosorbide 30 mg p.o. Daily-hold ACE I in setting of ABNER  8. Chronic atrial  "fibrillation: Anticoagulated with apixaban- I held this today as not considered evaluated in creat > 2.5.    9. Hypertension: Prior to admission medications are amlodipine 5 mg p.o. daily, lisinopril 20 mg p.o. daily, isosorbide mononitrate 30 mg p.o. daily and metoprolol XL 50 mg p.o. daily.  All of been resumed except ACE inhibitor has been held in the setting of his acute kidney injury and BPs are under fair control  10. Hypothyroidism resumed home levothyroxine  11. Abdominal pain: He has a small raised area on his abdomen when I push on it but it is not clearly a hernia there is no discoloration the pain is and is quite mild.  At this time I think is reasonable to just monitor it the pain is been stable and he is afebrile  DVT Prophylaxis: On apixaban at baseline, I have held that in light of his creat > 2.5 will resume when creat trends towards normal   Code Status: Full Code    Disposition Plan   Expected discharge as soon as renal function starts to improve     Entered: Meenu Catherine 01/13/2019, 5:47 PM         Interval History (Subjective):      He is feeling okay, is rather annoyed that he has to continue being in the hospital when he is feeling rather well.  His only complaint is that of kind of right sided abdominal pain that is different than what he has explained to me a couple of days ago.  Is been present for the past couple of days and rather chronic chronic congestive gnawing seems to feel better if he lays on his side.  He it sometimes worsens with defecation but not with eating and has had no nausea vomiting.  Denies any chest pains or shortness of breath                  Physical Exam:      Last Vital Signs:  BP (!) 174/96 (BP Location: Left arm)   Pulse 77   Temp 98  F (36.7  C) (Oral)   Resp 16   Ht 1.93 m (6' 4\")   Wt 117 kg (258 lb)   SpO2 94%   BMI 31.40 kg/m      I/O:  Not completely accurate  UO remains fair    Pleasant chronically ill in appearance but no acute distress during my " interview and exam and looks more animated and is more interactive today-so globally improved from yesterday head is normocephalic atraumatic although somewhat disheveled lungs actually are pretty clear to auscultation he exhibits normal respiratory effort heart is of a regular rate and rhythm without murmurs rubs or gallops abdominal exam is currently soft nontender nondistended except for a small may be 3 cm ovoid area that is tender to palpation it is not warm or red there is no clear hernia present no bruising, there is no rebound or guarding in association with it either skin is warm and dry and there is no cyanosis or clubbing of the extremities affect is appropriate and he is moving all extremities.  Right foot remains wrapped and marking for his cellulitis appears dramatically improved           Medications:      All current medications were reviewed with changes reflected in problem list.         Data:      All new lab and imaging data was reviewed.   Labs:  Recent Labs   Lab 01/13/19  0702      POTASSIUM 3.6   CHLORIDE 109   CO2 23   ANIONGAP 10   *   BUN 18   CR 3.45*   GFRESTIMATED 17*   GFRESTBLACK 20*   BETTIE 8.0*     Recent Labs   Lab 01/13/19  0702   WBC 6.8   HGB 11.6*   HCT 36.5*   MCV 90        Recent Labs   Lab 01/13/19  1707 01/13/19  1117 01/13/19  0702 01/13/19  0147 01/12/19  2117 01/12/19  1722  01/12/19  0729  01/11/19  1500  01/11/19  0653  01/10/19  1443   GLC  --   --  118*  --   --   --   --  118*  --  207*  --  181*  --  176*   * 122*  --  85 132* 174*   < >  --    < >  --    < >  --    < >  --     < > = values in this interval not displayed.      Imaging:

## 2019-01-13 NOTE — PLAN OF CARE
Pt A&Ox4. Up independently in room. Heel touch weight bearing, wears surgical shoes. Denied pain to surgical leg. Reported 4/10 abdominal discomfort. Given PRN tylenol.  Intermittent nausea, PRN zofran. Ginger ale. Abdomen distended. Bowels active., +flatus. Mod carb diet. ,132. Voiding adequately.  Denied loose stool during shift. Midline. VSS.

## 2019-01-13 NOTE — PLAN OF CARE
Alert and oriented. HTN. Tolerating high consistent carb diet, poor appetite. Up independently in room with heel touch WB and offloading shoe. Dressing CDI, mild edema in BLE. Baseline numbness in BLE. Voiding in adequate amounts. Denies pain. Baseline discomfort noted in lower abdomen. Midline patent. On IV Rocephin. Monitoring BG levels. Will continue to monitor.

## 2019-01-13 NOTE — PROGRESS NOTES
Pt is alert and oriented,sp/b in low 160s, lung sounds clear.+ve bowel sounds, passing flatus. Pt is up independently in the room with heel touch weight bearing to the right foot.Tolerating regular diet. No loose stools this shift.Dressing is clean dry and intact. voiding adequate amounts.Creatinine still elevated 3.28, N/S infusing. On iv Rocephin. BS 85.Plan to discharge in 2-3 days.

## 2019-01-14 VITALS
BODY MASS INDEX: 32.03 KG/M2 | HEIGHT: 76 IN | OXYGEN SATURATION: 92 % | SYSTOLIC BLOOD PRESSURE: 170 MMHG | WEIGHT: 263 LBS | RESPIRATION RATE: 18 BRPM | TEMPERATURE: 97.8 F | DIASTOLIC BLOOD PRESSURE: 98 MMHG | HEART RATE: 79 BPM

## 2019-01-14 LAB
ANION GAP SERPL CALCULATED.3IONS-SCNC: 9 MMOL/L (ref 3–14)
BASOPHILS # BLD AUTO: 0.1 10E9/L (ref 0–0.2)
BASOPHILS NFR BLD AUTO: 0.6 %
BUN SERPL-MCNC: 21 MG/DL (ref 7–30)
CALCIUM SERPL-MCNC: 8.6 MG/DL (ref 8.5–10.1)
CHLORIDE SERPL-SCNC: 108 MMOL/L (ref 94–109)
CO2 SERPL-SCNC: 21 MMOL/L (ref 20–32)
CREAT SERPL-MCNC: 3.4 MG/DL (ref 0.66–1.25)
DIFFERENTIAL METHOD BLD: ABNORMAL
EOSINOPHIL # BLD AUTO: 0.1 10E9/L (ref 0–0.7)
EOSINOPHIL NFR BLD AUTO: 1.1 %
ERYTHROCYTE [DISTWIDTH] IN BLOOD BY AUTOMATED COUNT: 13 % (ref 10–15)
GFR SERPL CREATININE-BSD FRML MDRD: 17 ML/MIN/{1.73_M2}
GLUCOSE BLDC GLUCOMTR-MCNC: 145 MG/DL (ref 70–99)
GLUCOSE BLDC GLUCOMTR-MCNC: 166 MG/DL (ref 70–99)
GLUCOSE SERPL-MCNC: 167 MG/DL (ref 70–99)
HCT VFR BLD AUTO: 37.7 % (ref 40–53)
HGB BLD-MCNC: 12.1 G/DL (ref 13.3–17.7)
IMM GRANULOCYTES # BLD: 0 10E9/L (ref 0–0.4)
IMM GRANULOCYTES NFR BLD: 0.3 %
LYMPHOCYTES # BLD AUTO: 0.8 10E9/L (ref 0.8–5.3)
LYMPHOCYTES NFR BLD AUTO: 8.6 %
MCH RBC QN AUTO: 28.6 PG (ref 26.5–33)
MCHC RBC AUTO-ENTMCNC: 32.1 G/DL (ref 31.5–36.5)
MCV RBC AUTO: 89 FL (ref 78–100)
MONOCYTES # BLD AUTO: 0.7 10E9/L (ref 0–1.3)
MONOCYTES NFR BLD AUTO: 8.1 %
NEUTROPHILS # BLD AUTO: 7.3 10E9/L (ref 1.6–8.3)
NEUTROPHILS NFR BLD AUTO: 81.3 %
NRBC # BLD AUTO: 0 10*3/UL
NRBC BLD AUTO-RTO: 0 /100
PLATELET # BLD AUTO: 279 10E9/L (ref 150–450)
POTASSIUM SERPL-SCNC: 3.7 MMOL/L (ref 3.4–5.3)
RBC # BLD AUTO: 4.23 10E12/L (ref 4.4–5.9)
SODIUM SERPL-SCNC: 138 MMOL/L (ref 133–144)
WBC # BLD AUTO: 8.9 10E9/L (ref 4–11)

## 2019-01-14 PROCEDURE — 25000128 H RX IP 250 OP 636: Performed by: INTERNAL MEDICINE

## 2019-01-14 PROCEDURE — 00000146 ZZHCL STATISTIC GLUCOSE BY METER IP

## 2019-01-14 PROCEDURE — 99239 HOSP IP/OBS DSCHRG MGMT >30: CPT | Performed by: INTERNAL MEDICINE

## 2019-01-14 PROCEDURE — 25000132 ZZH RX MED GY IP 250 OP 250 PS 637: Performed by: INTERNAL MEDICINE

## 2019-01-14 PROCEDURE — 25000131 ZZH RX MED GY IP 250 OP 636 PS 637: Performed by: INTERNAL MEDICINE

## 2019-01-14 PROCEDURE — 85025 COMPLETE CBC W/AUTO DIFF WBC: CPT | Performed by: INTERNAL MEDICINE

## 2019-01-14 PROCEDURE — 36415 COLL VENOUS BLD VENIPUNCTURE: CPT | Performed by: INTERNAL MEDICINE

## 2019-01-14 PROCEDURE — 80048 BASIC METABOLIC PNL TOTAL CA: CPT | Performed by: INTERNAL MEDICINE

## 2019-01-14 RX ORDER — AMLODIPINE BESYLATE 5 MG/1
10 TABLET ORAL DAILY
Qty: 60 TABLET | Refills: 0 | Status: ON HOLD | OUTPATIENT
Start: 2019-01-14 | End: 2019-01-25

## 2019-01-14 RX ORDER — LISINOPRIL 20 MG/1
20 TABLET ORAL 2 TIMES DAILY
Qty: 60 TABLET | Refills: 0 | Status: ON HOLD
Start: 2019-01-14 | End: 2019-01-25

## 2019-01-14 RX ADMIN — ACETAMINOPHEN 650 MG: 325 TABLET, FILM COATED ORAL at 03:52

## 2019-01-14 RX ADMIN — HYDRALAZINE HYDROCHLORIDE 10 MG: 20 INJECTION INTRAMUSCULAR; INTRAVENOUS at 04:06

## 2019-01-14 RX ADMIN — INSULIN ASPART 2 UNITS: 100 INJECTION, SOLUTION INTRAVENOUS; SUBCUTANEOUS at 09:05

## 2019-01-14 RX ADMIN — CEFTRIAXONE SODIUM 2 G: 2 INJECTION, POWDER, FOR SOLUTION INTRAMUSCULAR; INTRAVENOUS at 09:10

## 2019-01-14 RX ADMIN — INSULIN GLARGINE 10 UNITS: 100 INJECTION, SOLUTION SUBCUTANEOUS at 08:45

## 2019-01-14 RX ADMIN — ISOSORBIDE MONONITRATE 30 MG: 30 TABLET, EXTENDED RELEASE ORAL at 08:35

## 2019-01-14 RX ADMIN — PANTOPRAZOLE SODIUM 40 MG: 40 TABLET, DELAYED RELEASE ORAL at 08:35

## 2019-01-14 RX ADMIN — AMLODIPINE BESYLATE 10 MG: 10 TABLET ORAL at 08:35

## 2019-01-14 ASSESSMENT — ACTIVITIES OF DAILY LIVING (ADL)
ADLS_ACUITY_SCORE: 13

## 2019-01-14 ASSESSMENT — MIFFLIN-ST. JEOR: SCORE: 2064.46

## 2019-01-14 NOTE — PROGRESS NOTES
"Care Transitions Team: Following for CC, discharge planning, and disposition.        Per bedside RN Discharging MD would like a PCP follow up appt. For lab draw in \"a couple Days\"    There are no formal discharge recommendations as of yet, however did met with pt at bedside who explained that a follow up appt. could be made with Davion Cordova at [facility].    This was made for 1/17 Thursday at 1100, will update on recommended labs once addressed, on  discharge handoff.       Che Mcguire RN, BSN, Care Transitions Specialist   Care Transitions Team  [phone]        "

## 2019-01-14 NOTE — PROGRESS NOTES
Pt A&O, B/P continues to run high, +ve bowel sounds, passing flatus. Pt is up independently in the room with heel touch weight bearing to the right foot.Tolerating regular diet. No loose stools this shift.Dressing is clean dry and intact.Continues to complain of abdominal pain given tylenol with minimal effective. Baseline numbness to BLE.voiding adequate amounts.Creatinine still elevated 3.4, On iv Rocephin. .Plan to discharge  when creatinine stable.

## 2019-01-14 NOTE — DISCHARGE SUMMARY
Discharge Summary  Hospitalist Service    Jayro Elder MRN# [id number]   YOB: 1950 Age: 68 year old     Date of Admission:  1/5/2019  Date of Discharge:  1/14/2019  Admitting Physician:  Wale Dalton MD  Discharge Physician: Meenu Catherine MD  Discharging Service: Hospitalist Service     Primary Provider: Davion Cordova  Primary Care Physician Phone Number: [phone]         Discharge Diagnoses/Problem Oriented Hospital Course (Providers):    Jayro Elder was admitted on 1/5/2019 by Wale Dalton MD and I would refer you to their history and physical.  The following problems were addressed during his hospitalization:      Osteomyelitis  ABNER  Intermittent RLQ pain  Diarrhea  T2dm  CAD  Ischemic CM  Chronic AFib  htn  Hypothyroidism  Abdominal pan             Code Status:      Full Code        Brief Hospital Stay Summary Sent Home With Patient in AVS:       Summary of Stay: Jayro Elder is a 68 year old male with a history of type 2 diabetes complicated by prior diabetic foot infections, coronary artery disease with stents, and associated ischemic cardiomyopathy with most recent echocardiogram completed 9/2018 showing an ejection fraction of 35-40% with indeterminate diastolic dysfunction, stress testing completed on 9/14/2018 showing a fixed large mid to distal anterior and anteroseptal defect, atrial fibrillation on chronic apixaban, untreated obstructive sleep apnea admitted on 1/5/2019 with necrotic appearing right second toe with surrounding foot and leg erythema consistent with cellulitis and found to have osteomyelitis.     He was initially placed on broad-spectrum antibiotics that is subsequently been weaned down to ceftriaxone as strep is coming back as the only positive organism.  And he has been taken to the OR twice for successive amputations to ensure that the osteomyelitis has been completely removed.     His brief hospitalization has been complicated  by acute mildly oliguric renal failure with an increase in his creatinine from approximately 1->3.0.  Suspect multifactorial in etiology: Acute infection, vancomycin, and suspected chronic underlying vulnerability in the setting of uncontrolled diabetes.       Problem List:   1. 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  Has had surgery on both January 6 and January 8.  Most recent path report showing inflammation consistent with partially treated osteomyelitis.  Appreciate both podiatry and infectious disease input.  Continued with ceftriaxone while in-house, discussed with Dr Zacarias 3 days ago-when ready for discharge amox-clav for 10 days (renally adjusted if needed)-he has follow up arranged with our podiatry group   2. Acute kidney injury:  I suspect this is multifactorial including drug related vancomycin injury, prerenal azotemia and ACE inhibitor usage, and questionable infection related.  No evidence of significant urinary retention by bladder scanning, renal US normal. FeNA returns at 1.2 so likely a combo.  UO is good.  Discussed with Dr Prabhakar, he thinks it might take a week or 2 before his creat starts to trend back into the normal range.  He is ok with discharge with close follow up.  Agrees with continuing to hold lisinopril which can be reintroduced when renal function is normal    3. Intermittent right lower quadrant pain that spreads up and causes chest pain and associated shortness of breath: Present for over a year, and appears stable.  Etiology is unknown continue to monitor for now, given duration of symptoms doubt of significant clinical impact.  Of note- screening trop wnl even in setting of ABNER-advised f/u with primary MD  4. Diarrhea:  Watery in nature with some associated abdominal pain but no fever and no wbc.  2 issues of concern-? c diff in setting of abx vs (more likely) abx associated diarrhea.  C diff negative,  diarrhea resolved  5. Type 2 diabetes: Typically on use glargine 35 units daily, aspart 8 units 3 times daily pre-meal.  Continues on his pre-meal aspart as well as insulin sliding scale and blood sugars are inadequately controlled.  Will start glargine 5 unit(s) every day, and titrated up to 10 unit(s) 1/12, was a little leary of jumping into home dosing in setting of ABNER-, continued aspart insulin sliding scale.  will discharge with lower dose glargine as creat still high, but I suspect his insulin needs will climb as he recovers from his ABNER  6. Coronary artery disease: Continue home , isosorbide, beta-blocker, and statin, and resumed clopidogrel at discharge  7. Ischemic cardiomyopathy with most recent echocardiogram in this September 2018 showing an ejection fraction of 30-35%.  Baseline meds metoprolol XL at 50 mg p.o. nightly, lisinopril 20 mg p.o. twice daily, and isosorbide 30 mg p.o. Daily-hold ACE I in setting of ABNER  8. Chronic atrial fibrillation: Anticoagulated with apixaban- I held this as of 1/13/19 as not considered evaluated in creat > 2.5. Continue to hold at discharge, ok to resume when creat trending in right direction and < 3.0 with close f/u (Dr Prabhakar agrees)   9. Hypertension: Prior to admission medications are amlodipine 5 mg p.o. daily, lisinopril 20 mg p.o. daily, isosorbide mononitrate 30 mg p.o. daily and metoprolol XL 50 mg p.o. daily.  All resumed except ACE inhibitor has been held in the setting of his acute kidney injury and BPs are under inadequate control.  Therefore will discharge with increased amlodipine at 10 mg and cont isosorbide 30 and metop xl 50 mg, as creat normalizes would be reasonable to taper back on amlodipine if you need room to re-introduce ACE I  10. Hypothyroidism resumed home levothyroxine  11. Abdominal pain: He has a small raised area on his abdomen when I push on it but it is not clearly a hernia there is no discoloration the pain is and is quite mild.   At this time I think is reasonable to just monitor it the pain is been stable and he is afebrile.  It has resolved, I've asked that he f/u with his primary MD at discharge  DVT Prophylaxis: On apixaban at baseline, I have held that in light of his creat > 2.5 will resume when creat trends towards normal   Code Status: Full Code         Important Results:      As noted below          Pending Results:        Unresulted Labs Ordered in the Past 30 Days of this Admission     Date and Time Order Name Status Description    1/6/2019 1436 Anaerobic bacterial culture Preliminary             Discharge Instructions and Follow-Up:      Follow-up Appointments     Follow-up and recommended labs and tests       Follow up with Dr Sabino Garcia or Dr Ryan Bhagat within 5-7   days of discharge.    F/U with Dr Orozco at [facility] on 1/17/19 at 11 am  You should have a BMP drawn at that visit  Hold your lisinopril and eliquis until instructed to resume by your   primary MD  I've increased your amlodipine from 5 mg to 10 mg for better BP control   since your lisinopril is currently on hold    I've decreased your long acting insulin (glargine) from 35 units to 10   units, your insulin needs will likely ramp up as your renal function   improves               Discharge Disposition:      Discharged to home         Discharge Medications:        Current Discharge Medication List      START taking these medications    Details   !! order for DME Equipment being ordered: Other: surgical shoe male XL  Treatment Diagnosis: sp right 2nd toe amputaion  Qty: 1 Device, Refills: 0    Associated Diagnoses: S/P amputation of lesser toe, right (H)       !! - Potential duplicate medications found. Please discuss with provider.      CONTINUE these medications which have CHANGED    Details   amLODIPine (NORVASC) 5 MG tablet Take 2 tablets (10 mg) by mouth daily  Qty: 60 tablet, Refills: 0    Associated Diagnoses: Coronary artery disease  involving native coronary artery of native heart with angina pectoris (H)      apixaban ANTICOAGULANT (ELIQUIS) 5 MG tablet Take 1 tablet (5 mg) by mouth 2 times daily HOLD this medication until instructed to resume by your primary MD  Qty: 60 tablet, Refills: 0    Associated Diagnoses: Chronic atrial fibrillation (H)      insulin glargine U-300 (TOUJEO) 300 UNIT/ML injection Inject 10 Units Subcutaneous every morning  Qty: 1 mL, Refills: 0    Associated Diagnoses: Type 2 diabetes mellitus with diabetic nephropathy, with long-term current use of insulin (H)      lisinopril (PRINIVIL/ZESTRIL) 20 MG tablet Take 1 tablet (20 mg) by mouth 2 times daily HOLD this medication until instructed to resume per primary MD  Qty: 60 tablet, Refills: 0    Associated Diagnoses: Benign essential hypertension         CONTINUE these medications which have NOT CHANGED    Details   Cholecalciferol (D3 ADULT PO) Take 2,000 Units by mouth every evening       clopidogrel (PLAVIX) 75 MG tablet Take 1 tablet (75 mg) by mouth daily  Qty: 90 tablet, Refills: 2    Associated Diagnoses: Coronary artery disease involving native coronary artery of native heart with other form of angina pectoris (H)      insulin aspart (NOVOLOG FLEXPEN) 100 UNIT/ML pen Inject Subcutaneous 3 times daily (with meals) USes about 7-10 units with meal depending      isosorbide mononitrate (IMDUR) 30 MG 24 hr tablet Take 1 tablet (30 mg) by mouth daily  Qty: 90 tablet, Refills: 0    Associated Diagnoses: Hypertension goal BP (blood pressure) < 140/90      levothyroxine (SYNTHROID, LEVOTHROID) 137 MCG tablet Take 137 mcg by mouth At Bedtime   Qty: 90 tablet, Refills: 3      metoprolol succinate (TOPROL-XL) 100 MG 24 hr tablet Take 0.5 tablets (50 mg) by mouth At Bedtime  Qty: 90 tablet, Refills: 3    Associated Diagnoses: Coronary artery disease involving native coronary artery of native heart without angina pectoris      nitroglycerin (NITROSTAT) 0.4 MG sublingual tablet  "Place 1 tablet (0.4 mg) under the tongue every 5 minutes as needed for chest pain  Qty: 50 tablet, Refills: 0    Associated Diagnoses: Other chest pain      pantoprazole (PROTONIX) 40 MG enteric coated tablet Take 1 tablet (40 mg) by mouth every morning (before breakfast)  Qty: 30 tablet, Refills: 0    Associated Diagnoses: GERD (gastroesophageal reflux disease)      simvastatin (ZOCOR) 40 MG tablet Take 1 tablet (40 mg) by mouth At Bedtime  Qty: 90 tablet, Refills: 3    Associated Diagnoses: Coronary artery disease involving native coronary artery of native heart without angina pectoris      insulin pen needle 31G X 6 MM Use  as directed.  Qty: 100 each, Refills: prn    Associated Diagnoses: Type 2 diabetes mellitus with diabetic peripheral angiopathy without gangrene, unspecified long term insulin use status      !! order for DME Equipment being ordered: Walker Wheels () and Walker ()  Treatment Diagnosis: Impaired gait, heel WB bilaterally.  Qty: 1 each, Refills: 0    Associated Diagnoses: Diabetic ulcer of left midfoot associated with diabetes mellitus of other type, unspecified ulcer stage (H)       !! - Potential duplicate medications found. Please discuss with provider.            Allergies:         Allergies   Allergen Reactions     No Known Allergies            Consultations This Hospital Stay:      Consultation during this admission received from infectious disease, nephrology and podiatry          Condition and Physical on Discharge:      Discharge condition: Stable   Vitals: Blood pressure 143/74, pulse 79, temperature 96.8  F (36  C), temperature source Oral, resp. rate 16, height 1.93 m (6' 4\"), weight 119.3 kg (263 lb), SpO2 92 %.     Constitutional: Pleasant nad looks stated age head nc/at sclera clear   Lungs: ctab nl effort    Cardiovascular: iir no mrg no le edema   Abdomen: S/nt/nd, he still has that bump just lateral and to the right of his umbilicus - remains without skin changes and " is not tender at all today. His episodic RLQ pain is not present at discharge per exam either   Skin: W/d no c/c   Other: Alert and oriented affect appropriate caba ambulating with heel bearing on right foot          Discharge Time:      Greater than 30 minutes.        Image Results From This Hospital Stay (For Non-EPIC Providers):        Results for orders placed or performed during the hospital encounter of 01/05/19   XR Toe Right G/E 2 Views    Narrative    TWO VIEWS OF THE TOES OF THE RIGHT FOOT   1/5/2019 3:31 PM    HISTORY: 2nd toe swelling, bruising, and cellulitis.    COMPARISON: None.      Impression    IMPRESSION: No fracture or osseous lesion is seen. There is prominent  hallux valgus with moderate to advanced degenerative changes of the  first metatarsophalangeal joint. No other significant joint space  pathology is seen. There is prominent soft tissue swelling surrounding  the distal phalanx of the second toe. Small areas of decreased  attenuation distally on the frontal view may represent gas or a soft  tissue defect. Clinical correlation is recommended. No underlying  cortical erosive changes are seen to suggest osteomyelitis. No other  abnormality is seen.    TALHA OTTO MD   US Lower Extremity Venous Duplex Right    Narrative    VENOUS ULTRASOUND RIGHT LOWER EXTREMITY  1/6/2019 11:35 AM     HISTORY: Right leg pain, warmth. Does have right foot infection,  though consider deep venous thrombosis.    COMPARISON: None.    FINDINGS:  Examination of the deep veins with graded compression and  color flow Doppler with spectral wave form analysis shows no evidence  of thrombus in the common femoral vein, femoral vein, popliteal vein  or calf veins.  There is no venous insufficiency.      Impression    IMPRESSION: No evidence of deep venous thrombosis.    JOSLYN HAN MD   MR Foot Right w/o & w Contrast    Narrative    MR FOOT RIGHT WITH AND WITHOUT CONTRAST January 6, 2019 5:37 PM     HISTORY:  Osteomyelitis suspected, foot swelling, diabetic. Right  second toe gas gangrene, cellulitis of foot, status post open partial  second toe amputation earlier today, evaluate for residual  osteomyelitis.     TECHNIQUE: Multiplanar, multisequence without and with contrast. 10mL  Gadavist.     COMPARISON: 9/7/2018.    FINDINGS: Changes of second toe amputation at the PIP joint level.  Changes of third toe amputation at the DIP joint level. There is mild  edematous signal at the distal tip of the second proximal phalanx.  There is subtle contrast enhancement, best seen on series 9. This  could represent mild focal osteomyelitis in light of the overlying  wound/ulcer, although reactive edema is possible. Nonspecific soft  tissue edema along the foot. This has some contrast enhancement at the  second toe, consistent with cellulitis. No focal abscess identified.  Scattered degenerative changes. Hallux valgus.      Impression    IMPRESSION:  1. There may be mild focal osteomyelitis involving the distal tip of  the second proximal phalanx.  2. Additional findings discussed above.    YARIEL CRUZ MD   XR Toe Right G/E 2 Views    Narrative    RIGHT TOE(S) TWO TO THREE VIEWS  1/8/2019 6:21 PM     HISTORY: Status post revision second toe amputation.     COMPARISON: January 5, 2019      Impression    IMPRESSION: Amputation changes of the second digit, degenerative  changes otherwise in the foot. No acute osseous abnormality  demonstrated.    YE GAONA MD   US Renal Complete    Narrative    ULTRASOUND RETROPERITONEAL COMPLETE 1/12/2019 2:29 PM     HISTORY: ABNER.    COMPARISON: November 2, 2010    FINDINGS: The bilateral renal parenchyma are unremarkable in  echogenicity without evidence for shadowing stone or mass. No renal  cysts. No hydronephrosis. Right kidney measures 12.9 x 6.3 x 5.0 cm  and the left measures 13.8 x 7.4 x 5.1 cm. Cortical thickness is 1.5  cm on the right and 1.7 cm on the left. Bladder is unremarkable  given  its level of distention.      Impression    IMPRESSION: Negative bilateral renal ultrasound.    YE GAONA MD   XR Abdomen 2 Views    Narrative    ABDOMEN TWO VIEW   1/12/2019 2:40 PM     HISTORY:  Abdominal pain.    COMPARISON: 11/16/2010    FINDINGS: Spine degenerative change and mild scoliosis.      Impression    IMPRESSION: No bowel dilatation. There is some colonic fluid; this can  be seen with enteritis or irritable bowel syndrome and clinical  correlation is recommended.    YARIEL CRUZ MD           Most Recent Lab Results In EPIC (For Non-EPIC Providers):    Most Recent 3 CBC's:  Recent Labs   Lab Test 01/14/19  0610 01/13/19  0702 01/12/19  0729   WBC 8.9 6.8 6.9   HGB 12.1* 11.6* 12.1*   MCV 89 90 89    241 236      Most Recent 3 BMP's:  Recent Labs   Lab Test 01/14/19  0610 01/13/19  0702 01/12/19  0729    142 140   POTASSIUM 3.7 3.6 3.6   CHLORIDE 108 109 109   CO2 21 23 25   BUN 21 18 19   CR 3.40* 3.45* 3.28*   ANIONGAP 9 10 6   BETTIE 8.6 8.0* 8.2*   * 118* 118*     Most Recent 3 Troponin's:No lab results found.  Most Recent 3 INR's:  Recent Labs   Lab Test 09/06/18  1542 06/09/17  1405 11/03/10  0940   INR 1.36* 1.18* 1.03     Most Recent 2 LFT's:  Recent Labs   Lab Test 01/13/19  0702 01/11/19  0653   AST 10 9   ALT 15 14   ALKPHOS 66 62   BILITOTAL 0.5 0.6     Most Recent Cholesterol Panel:  Recent Labs   Lab Test 04/21/17  0440   CHOL 118   LDL 55   HDL 51   TRIG 59     Most Recent 6 Bacteria Isolates From Any Culture (See EPIC Reports for Culture Details):  Recent Labs   Lab Test 01/06/19  1436 01/05/19  1708 01/05/19  1558 09/09/18  1533 09/08/18  0613 09/06/18  1550   CULT Heavy growth  Finegoldia magna (Peptostreptococcus jennifer)  Susceptibility testing not routinely done  *  Heavy growth  Streptococcus dysgalactiae serogroup C/G  *  Moderate growth  Staphylococcus lugdunensis  *  Light growth  Streptococcus dysgalactiae serogroup C/G  Susceptibility testing done  on previous specimen  *  Single colony  Coagulase negative Staphylococcus  Susceptibility testing not routinely done  These bacteria are part of normal skin michael, but on occasion, may be true pathogens.    Clinical correlation must be applied to interpreting this microbiology result.  *  Culture negative monitoring continues No growth No growth No growth No growth Cultured on the 1st day of incubation:  Streptococcus agalactiae sero group B  *  Critical Value/Significant Value, preliminary result only, called to and read back by  Teresa John RN on RHMS5 @1055 on 9/7/18. .    (Note)  POSITIVE for STREPTOCOCCUS AGALACTIAE (Group B Strep) by Audit Verify  multiplex nucleic acid test. Penicillin and ampicillin are drugs of  choice, nonsusceptible isolates have not been reported. Final  identification and antimicrobial susceptibility testing will be  verified by standard methods.    Specimen tested with Flowboardigene multiplex, gram-positive blood culture  nucleic acid test for the following targets: Staph aureus, Staph  epidermidis, Staph lugdunensis, other Staph species, Enterococcus  faecalis, Enterococcus faecium, Streptococcus species, S. agalactiae,  S. anginosus grp., S. pneumoniae, S. pyogenes, Listeria sp., mecA  (methicillin resistance) and Elisha/B (vancomycin resistance).    Critical Value/Significant Value called to and read back by Teresa John RN on 9.7.18 at 1340. bw         Most Recent TSH, T4 and HgbA1c:   Recent Labs   Lab Test 01/05/19  1559 09/06/18  1542   TSH  --  0.40   A1C 11.7* 9.4*

## 2019-01-14 NOTE — PLAN OF CARE
Alert and oriented. HTN at times, monitoring BP. Tolerating high consistent carb diet, poor appetite. Monitoring BG levels. Up independently in room with heel touch WB and orthotic shoe. Dressing CDI, baseline numbness in feet. Mild edema in BLE. Voiding in adequate amounts. Mild lower abdominal pain (intermittent baseline per pt); denied any interventions this shift. On IV Rocephin, midline to R arm patent. Midline dressing changed this AM. Plans d/c to home this evening if cleared by nephrology.

## 2019-01-14 NOTE — PLAN OF CARE
VS: BP runs high, metoprolol given  Orientation: A/O x4  Tele: NA  Glucose checks: QID, , refused bedtime insulin  Activity: independent  with surgical boot  Diet: diabetic diet  GI: normoactive  : voiding  Respiratory: clear, RA  IV: midline, no blood return, lab to draw, SL  Plan:  home in 1-2 day depending on creatinine level

## 2019-01-14 NOTE — PROGRESS NOTES
Nephrology Progress Note          Assessment and Plan:   ARF, non-oliguric, slightly better today.  Increasing UO. No uremic sx's.  No sx's of fluid overload despite increasing wt.  Assume etiology is ATN, probably due to infection and vancomycin.  OK for discharge today from my view.  Renal function should improve over the next 7-10 days.  He should have visit with PCP and blood chemistries checked later this week, ideally 1/16-17 and then regularly until creatinine stabilizes.  He could resume apixaban later this week, as long as creatinine is improving.  Hold lisinopril until creatinine returns to baseline. He should have improvement in fluid balance with improving renal function.  No diuretic Rx recommended at this time.  No follow-up needed in our office yet, but we would like to see him if recovery from ABNER is not complete.        Cellulitis of right lower leg    * No resolved hospital problems. *               Interval History:   no new complaints, up and ambulating, alert, oriented to person, place and time and doing well; no cp, sob, n/v/d, or abd pain.  VS ok.  Good rm air SaO2.  Good intake.  UO up to >3L/day.  Wt up ~6kg since admn and ~2kg since 1/12.  Meds and labs reviewed.  Lytes nl. Cr down to 3.4 from 3.45.  Glucoses ok, 100-200.                Medications:       amLODIPine  10 mg Oral Daily     cefTRIAXone  2 g Intravenous Q24H     insulin aspart  8 Units Subcutaneous TID w/meals     insulin aspart  1-10 Units Subcutaneous TID AC     insulin aspart  1-7 Units Subcutaneous At Bedtime     insulin glargine  10 Units Subcutaneous QAM AC     isosorbide mononitrate  30 mg Oral Daily     levothyroxine  137 mcg Oral At Bedtime     metoprolol succinate ER  50 mg Oral At Bedtime     pantoprazole  40 mg Oral QAM AC     simvastatin  40 mg Oral At Bedtime     sodium chloride (PF)  10 mL Intracatheter Q8H     vitamin D3  2,000 Units Oral QPM       - MEDICATION INSTRUCTIONS -                      Physical Exam:        Vital Sign Ranges  Temp:  [96.8  F (36  C)-99  F (37.2  C)] 96.8  F (36  C)  Pulse:  [79] 79  Heart Rate:  [73-79] 75  Resp:  [16-17] 16  BP: (143-177)/() 143/74  SpO2:  [90 %-94 %] 92 %    Weight, current:  119.3 kg (actual weight)  Weight change: 2.268 kg (5 lb)    I/O last 3 completed shifts:  In: 2904 [P.O.:1460; IV Piggyback:1444]  Out: 3300 [Urine:3300]    Physical Exam:   General:  Patient comfortable, in no apparent distress.  Awake, alert, oriented x3.  Neck:  Supple, no JVD.  Lungs:  Clear to auscultation bilaterally.  Cardiac:  Regular rate and rhythm, no murmurs, rub, or gallops.  Abdomen:  Soft, nontender, physiologic sounds.  Extremities:  ~Same edema, R>L.  2+ pulses.  Skin:  Warm, dry.  Neurologic:  No focal deficits.             Data:        Lab Results   Component Value Date     01/14/2019    Lab Results   Component Value Date    CHLORIDE 108 01/14/2019    Lab Results   Component Value Date    BUN 21 01/14/2019      Lab Results   Component Value Date    POTASSIUM 3.7 01/14/2019    Lab Results   Component Value Date    CO2 21 01/14/2019    Lab Results   Component Value Date    CR 3.40 (H) 01/14/2019        Lab Results   Component Value Date     01/14/2019     01/13/2019     01/12/2019     Lab Results   Component Value Date    POTASSIUM 3.7 01/14/2019    POTASSIUM 3.6 01/13/2019    POTASSIUM 3.6 01/12/2019     Lab Results   Component Value Date    CHLORIDE 108 01/14/2019    CHLORIDE 109 01/13/2019    CHLORIDE 109 01/12/2019     Lab Results   Component Value Date    CO2 21 01/14/2019    CO2 23 01/13/2019    CO2 25 01/12/2019     Lab Results   Component Value Date    CR 3.40 (H) 01/14/2019    CR 3.45 (H) 01/13/2019    CR 3.28 (H) 01/12/2019     Lab Results   Component Value Date    BUN 21 01/14/2019    BUN 18 01/13/2019    BUN 19 01/12/2019     Lab Results   Component Value Date    HGB 12.1 (L) 01/14/2019    HGB 11.6 (L) 01/13/2019    HGB 12.1 (L) 01/12/2019     No  results found for: PH, PHARTERIAL, PO2, [id number], SAT, PCO2, HCO3, BASEEXCESS, NAWAF, BEB          Johnathan Juarez MD  Nephrology; "[employer]  [phone]

## 2019-01-14 NOTE — PROGRESS NOTES
[facility]  Infectious Disease Progress Note          Assessment and Plan:   IMPRESSION:   1.  A 68-year-old male with acute right diabetic foot infection including second toe osteomyelitis, cultures, primarily strep, amputation done at this point.  No major clinical sepsis.   2.  Prior episode in the same foot of bacteremia with strep and osteomyelitis in a third toe earlier this year.   3.  Diabetes mellitus with neuropathy.   4.  Chronic left first metatarsal ulcer, no clear deep infection.  Podiatry following.     5.  Prior cerebrovascular infarction.   6.  Coronary artery disease.   7.  Chronic atrial fibrillation.   8 Acute renal failure 1/9, ? vanc0     RECOMMENDATIONS:   1.  Only strep in cx and now renal failure, cont  ceftriaxone  2.  On this occasion, assuming confident all major bone infection resected with no bacteremia, should be able to treat orally.     3.  Latest op noted, await path, follow foot, all signs no residual deep infection  4 Creat still 3, is urinating follow closely,when disposition ready augmentin 10 days, 875 daily with creat over 2               Interval History:   no new complaints and doing well; no cp, sob, n/v/d, or abd pain. Pain Ok, no fever BC neg only strep foot cx  Creat 3 still              Medications:       amLODIPine  10 mg Oral Daily     cefTRIAXone  2 g Intravenous Q24H     insulin aspart  8 Units Subcutaneous TID w/meals     insulin aspart  1-10 Units Subcutaneous TID AC     insulin aspart  1-7 Units Subcutaneous At Bedtime     insulin glargine  10 Units Subcutaneous QAM AC     isosorbide mononitrate  30 mg Oral Daily     levothyroxine  137 mcg Oral At Bedtime     metoprolol succinate ER  50 mg Oral At Bedtime     pantoprazole  40 mg Oral QAM AC     simvastatin  40 mg Oral At Bedtime     sodium chloride (PF)  10 mL Intracatheter Q8H     vitamin D3  2,000 Units Oral QPM                  Physical Exam:   Blood pressure (!) 170/98, pulse 79,  "temperature 97.8  F (36.6  C), temperature source Oral, resp. rate 18, height 1.93 m (6' 4\"), weight 119.3 kg (263 lb), SpO2 92 %.  Wt Readings from Last 2 Encounters:   01/14/19 119.3 kg (263 lb)   09/06/18 115.1 kg (253 lb 11.2 oz)     Vital Signs with Ranges  Temp:  [96.8  F (36  C)-99  F (37.2  C)] 97.8  F (36.6  C)  Pulse:  [79] 79  Heart Rate:  [73-77] 77  Resp:  [16-18] 18  BP: (143-177)/() 170/98  SpO2:  [90 %-93 %] 92 %    Constitutional: Awake, alert, cooperative, no apparent distress   Lungs: Clear to auscultation bilaterally, no crackles or wheezing   Cardiovascular: Regular rate and rhythm, normal S1 and S2, and no murmur noted   Abdomen: Normal bowel sounds, soft, non-distended, non-tender   Skin: No rashes, no cyanosis, same edema sl cellulitis, foot wrapped   Other:           Data:   All microbiology laboratory data reviewed.  Recent Labs   Lab Test 01/14/19  0610 01/13/19  0702 01/12/19  0729   WBC 8.9 6.8 6.9   HGB 12.1* 11.6* 12.1*   HCT 37.7* 36.5* 37.3*   MCV 89 90 89    241 236     Recent Labs   Lab Test 01/14/19  0610 01/13/19  0702 01/12/19  0729   CR 3.40* 3.45* 3.28*     Recent Labs   Lab Test 09/08/18  0614   SED 15     Recent Labs   Lab Test 01/06/19  1436 01/05/19  1708 01/05/19  1558 09/09/18  1533 09/08/18  0613 09/06/18  1550 09/06/18  1545 09/06/18  1541 03/03/18  1936   CULT Heavy growth  Finegoldia magna (Peptostreptococcus jennifer)  Susceptibility testing not routinely done  *  Heavy growth  Streptococcus dysgalactiae serogroup C/G  *  Moderate growth  Staphylococcus lugdunensis  *  Light growth  Streptococcus dysgalactiae serogroup C/G  Susceptibility testing done on previous specimen  *  Single colony  Coagulase negative Staphylococcus  Susceptibility testing not routinely done  These bacteria are part of normal skin michael, but on occasion, may be true pathogens.    Clinical correlation must be applied to interpreting this microbiology result.  *  Culture " negative monitoring continues No growth No growth No growth No growth Cultured on the 1st day of incubation:  Streptococcus agalactiae sero group B  *  Critical Value/Significant Value, preliminary result only, called to and read back by  Teresa John RN on RHMS5 @1055 on 9/7/18. ch.    (Note)  POSITIVE for STREPTOCOCCUS AGALACTIAE (Group B Strep) by [place]  multiplex nucleic acid test. Penicillin and ampicillin are drugs of  choice, nonsusceptible isolates have not been reported. Final  identification and antimicrobial susceptibility testing will be  verified by standard methods.    Specimen tested with [facility] multiplex, gram-positive blood culture  nucleic acid test for the following targets: Staph aureus, Staph  epidermidis, Staph lugdunensis, other Staph species, Enterococcus  faecalis, Enterococcus faecium, Streptococcus species, S. agalactiae,  S. anginosus grp., S. pneumoniae, S. pyogenes, Listeria sp., mecA  (methicillin resistance) and Elisha/B (vancomycin resistance).    Critical Value/Significant Value called to and read back by Teresa John RN on 9.7.18 at 1340. bw     Cultured on the 1st day of incubation:  Streptococcus agalactiae sero group B  Susceptibility testing done on previous specimen  *  Critical Value/Significant Value, preliminary result only, called to and read back by  Ofe Fernandes RN on RHMS5 @1515 on 9/7/18. ch.   Heavy growth  Staphylococcus aureus  *  Heavy growth  Beta hemolytic Streptococcus group B  *  Moderate growth  Normal skin michael    No growth No growth

## 2019-01-15 ENCOUNTER — TELEPHONE (OUTPATIENT)
Dept: FAMILY MEDICINE | Facility: CLINIC | Age: 69
End: 2019-01-15

## 2019-01-15 ENCOUNTER — PATIENT OUTREACH (OUTPATIENT)
Dept: CARE COORDINATION | Facility: CLINIC | Age: 69
End: 2019-01-15

## 2019-01-15 ASSESSMENT — ACTIVITIES OF DAILY LIVING (ADL): DEPENDENT_IADLS:: CLEANING;COOKING

## 2019-01-15 NOTE — TELEPHONE ENCOUNTER
"ED / Discharge Outreach Protocol    Patient Contact    Attempt # 1    Was call answered?  Yes.  \"May I please speak with <patient name>\"  Is patient available?   Yes    Hospital/TCU/ED for chronic condition Discharge Protocol    \"Hi, my name is Radha SURENDRA Giron, a registered nurse, and I am calling from [facility].  I am calling to follow up and see how things are going for you after your recent hospital stay.\"    Tell me how you are doing now that you are home?\" Patient stated \"This is the third call I've gotten about this\"   When asked if he had the time/energy to have a conversation with me he stated \"No\".   I asked if he had a clear understanding of his discharge instructions. He stated \"Yes\".  I asked if he had any questions about his medications. He stated, \"No.\"   I asked if he was planning on keeping his f/u appt with Ian on Thursday and he stated \"Yes.\"   I asked if he had any other questions or concerns and he stated \"No.\"    Discharge Instructions    \"Let's review your discharge instructions.  What is/are the follow-up recommendations?      \"Has an appointment with your primary care provider been scheduled?\"   Yes. (confirm)   Next 5 appointments (look out 90 days)    Jan 17, 2019  3:40 PM CST  [facility] Davion [facility]-C  [facility] [address] [facility] [address] [phone]        \"When you see the provider, I would recommend that you bring your medications with you.\"    Medications    \"Tell me what changed about your medicines when you discharged?\"    Changes to chronic meds?    2 or more - Epic MTM referral needed    \"What questions do you have about your medications?\"    None     New diagnoses of heart failure, COPD, diabetes, or MI?    No     On insulin: \"Did you start on insulin in the hospital or did you have your insulin dose changed?\"  Dose changed         Medication reconciliation completed? Attempted, but " "patient unable to complete on phone - Epic MTM Referral needed   Was MTM referral placed (*Make sure to put transitions as reason for referral)?   No, patient did not want to complete f/u assessment.    Call Summary    \"What questions or concerns do you have about your recent visit and your follow-up care?\"     none    \"If you have questions or things don't continue to improve, we encourage you contact us through the main clinic number (give number).  Even if the clinic is not open, triage nurses are available 24/7 to help you.     We would like you to know that our clinic has extended hours (provide information).  We also have urgent care (provide details on closest location and hours/contact info)\"      \"Thank you for your time and take care!\"    Radha SMART Triage RN                   "

## 2019-01-15 NOTE — TELEPHONE ENCOUNTER
Please call patient for Inpatient Discharge f/u 01/14/19. S/P Amputation Pf Lesser Toe. Right, Cellulitis. Ruth Behrens.

## 2019-01-15 NOTE — PLAN OF CARE
PRIMARY DIAGNOSIS: Cellulitis of (R) lower leg, (R) Diabetic foot Infection, 2nd Toe  Osteomyelitis, s/p Amputation of 2nd Toe On (R) Foot 1/8/2019  GOALS TO BE MET BEFORE DISCHARGE:  1. ADLs back to baseline: Yes    2. Activity and level of assistance: Ambulating independently.    3. Pain status: Improved-controlled with oral pain medications.    4. Return to near baseline physical activity: Yes     Discharge Planner Nurse   Safe discharge environment identified: Yes  Barriers to discharge: No  Patient alert & orient x4. Awaiting discharge orders by MD. Wound dressing on Right foot changed with stitches intact at 2nd toe site (s/p amputation). (+) CSM BUE/BLE. No respiratory or cardiac distress voiced or noted. Patient denies pain/discomfort. Education on wound care precautions & hand hygiene. All personal effects & discharge medications given/signed by patient for his discharge. Discharge instructions & follow-up appointments explained to patient with verbal return of understanding before signing his discharge paperwork. Patient Discharged at 1710 via private vehicle to his home.

## 2019-01-15 NOTE — PROGRESS NOTES
"Transition Communication Hand-off for Care Transitions to Next Level of Care Provider    Name: Jayro Elder  : 1950  MRN #: [id number]  Primary Care Provider: Davion Cordova  Primary Care MD Name: GABBY   Primary Clinic: [address] [place] [address]  Primary Care Clinic Name: [facility]   Reason for Hospitalization:  Cellulitis [L03.90]  Admit Date/Time: 2019  2:37 PM  Discharge Date: 1/15/2018  Payor Source: Payor: [employer] / Plan: [employer] MEDICARE ADVANTAGE / Product Type: Medicare /     Readmission Assessment Measure (ANDRIA) Risk Score/category: AVERAGE   Reason for Communication Hand-off Referral: Other CTS following average ANDRIA pt with hx of DM A1C 11.7 , S/p OR on foot r/t DM. Assessing for gaps and needs     Discharge Plan: Return Home        Concern for non-adherence with plan of care:   Y/N Pt is not engaging.   Discharge Needs Assessment:  Needs      Most Recent Value   Equipment Currently Used at Home  cane, straight, shower chair, walker, rolling, wheelchair, manual [4WW and a knee scooter]                Key Recommendations: CTS following pt. Toe, right, second toe partial amputation-   -necrosis with abscess formation, Negative for diagnostic osteomyelitis within planes examined.   A1C 11.7, pt notes this to be 2/2 to his former insurance plan, did not fill supplies or meds -  Pt notes goal to \"get back on track with new insurance\"   Please follow pt closely in order to provide on going assessment and intervention in [facility]  in care of his chronic condtions, DM, S/p toe amp.  Promotion of healing.     SEE discharge AVS for follow up specifics   Che Mcguire    AVS/Discharge Summary is the source of truth; this is a helpful guide for improved communication of patient story            "

## 2019-01-17 ENCOUNTER — APPOINTMENT (OUTPATIENT)
Dept: GENERAL RADIOLOGY | Facility: CLINIC | Age: 69
DRG: 292 | End: 2019-01-17
Payer: COMMERCIAL

## 2019-01-17 ENCOUNTER — HOSPITAL ENCOUNTER (INPATIENT)
Facility: CLINIC | Age: 69
LOS: 8 days | Discharge: HOME | DRG: 292 | End: 2019-01-25
Attending: EMERGENCY MEDICINE | Admitting: INTERNAL MEDICINE
Payer: COMMERCIAL

## 2019-01-17 ENCOUNTER — NURSE TRIAGE (OUTPATIENT)
Dept: NURSING | Facility: CLINIC | Age: 69
End: 2019-01-17

## 2019-01-17 ENCOUNTER — TELEPHONE (OUTPATIENT)
Dept: CARDIOLOGY | Facility: CLINIC | Age: 69
End: 2019-01-17

## 2019-01-17 DIAGNOSIS — E87.70 FLUID OVERLOAD: ICD-10-CM

## 2019-01-17 DIAGNOSIS — I50.21 ACUTE SYSTOLIC CONGESTIVE HEART FAILURE (H): ICD-10-CM

## 2019-01-17 DIAGNOSIS — Z87.891 PERSONAL HISTORY OF TOBACCO USE, PRESENTING HAZARDS TO HEALTH: ICD-10-CM

## 2019-01-17 DIAGNOSIS — I63.432 CEREBROVASCULAR ACCIDENT (CVA) DUE TO EMBOLISM OF LEFT POSTERIOR CEREBRAL ARTERY (H): ICD-10-CM

## 2019-01-17 DIAGNOSIS — E11.21 TYPE 2 DIABETES MELLITUS WITH DIABETIC NEPHROPATHY, WITH LONG-TERM CURRENT USE OF INSULIN (H): ICD-10-CM

## 2019-01-17 DIAGNOSIS — I25.119 CORONARY ARTERY DISEASE INVOLVING NATIVE CORONARY ARTERY OF NATIVE HEART WITH ANGINA PECTORIS (H): ICD-10-CM

## 2019-01-17 DIAGNOSIS — I50.23 ACUTE ON CHRONIC SYSTOLIC CONGESTIVE HEART FAILURE (H): Primary | ICD-10-CM

## 2019-01-17 DIAGNOSIS — Z95.5 STENTED CORONARY ARTERY: ICD-10-CM

## 2019-01-17 DIAGNOSIS — Z79.4 TYPE 2 DIABETES MELLITUS WITH DIABETIC NEPHROPATHY, WITH LONG-TERM CURRENT USE OF INSULIN (H): ICD-10-CM

## 2019-01-17 DIAGNOSIS — I25.118 CORONARY ARTERY DISEASE INVOLVING NATIVE CORONARY ARTERY OF NATIVE HEART WITH OTHER FORM OF ANGINA PECTORIS (H): ICD-10-CM

## 2019-01-17 DIAGNOSIS — I10 HYPERTENSION GOAL BP (BLOOD PRESSURE) < 140/90: ICD-10-CM

## 2019-01-17 DIAGNOSIS — I10 BENIGN ESSENTIAL HYPERTENSION: ICD-10-CM

## 2019-01-17 DIAGNOSIS — I25.10 ATHEROSCLEROSIS OF NATIVE CORONARY ARTERY OF NATIVE HEART WITHOUT ANGINA PECTORIS: ICD-10-CM

## 2019-01-17 PROBLEM — I50.9 CHF (CONGESTIVE HEART FAILURE) (H): Status: ACTIVE | Noted: 2019-01-17

## 2019-01-17 LAB
ALBUMIN SERPL-MCNC: 3.3 G/DL (ref 3.4–5)
ALBUMIN UR-MCNC: NEGATIVE MG/DL
ALP SERPL-CCNC: 77 U/L (ref 40–150)
ALT SERPL W P-5'-P-CCNC: 17 U/L (ref 0–70)
ANION GAP SERPL CALCULATED.3IONS-SCNC: 12 MMOL/L (ref 3–14)
APPEARANCE UR: CLEAR
AST SERPL W P-5'-P-CCNC: 10 U/L (ref 0–45)
BACTERIA SPEC CULT: ABNORMAL
BACTERIA SPEC CULT: ABNORMAL
BASOPHILS # BLD AUTO: 0 10E9/L (ref 0–0.2)
BASOPHILS # BLD AUTO: 0 10E9/L (ref 0–0.2)
BASOPHILS NFR BLD AUTO: 0.1 %
BASOPHILS NFR BLD AUTO: 0.1 %
BILIRUB SERPL-MCNC: 1.2 MG/DL (ref 0.2–1.3)
BILIRUB UR QL STRIP: NEGATIVE
BUN SERPL-MCNC: 25 MG/DL (ref 7–30)
CALCIUM SERPL-MCNC: 8.4 MG/DL (ref 8.5–10.1)
CHLORIDE SERPL-SCNC: 103 MMOL/L (ref 94–109)
CO2 SERPL-SCNC: 21 MMOL/L (ref 20–32)
COLOR UR AUTO: ABNORMAL
CREAT SERPL-MCNC: 2.91 MG/DL (ref 0.66–1.25)
DIFFERENTIAL METHOD BLD: ABNORMAL
DIFFERENTIAL METHOD BLD: ABNORMAL
EOSINOPHIL # BLD AUTO: 0.1 10E9/L (ref 0–0.7)
EOSINOPHIL # BLD AUTO: 0.1 10E9/L (ref 0–0.7)
EOSINOPHIL NFR BLD AUTO: 0.6 %
EOSINOPHIL NFR BLD AUTO: 1.3 %
ERYTHROCYTE [DISTWIDTH] IN BLOOD BY AUTOMATED COUNT: 13.1 % (ref 10–15)
ERYTHROCYTE [DISTWIDTH] IN BLOOD BY AUTOMATED COUNT: 13.2 % (ref 10–15)
GFR SERPL CREATININE-BSD FRML MDRD: 21 ML/MIN/{1.73_M2}
GLUCOSE BLDC GLUCOMTR-MCNC: 240 MG/DL (ref 70–99)
GLUCOSE SERPL-MCNC: 317 MG/DL (ref 70–99)
GLUCOSE UR STRIP-MCNC: 300 MG/DL
HCT VFR BLD AUTO: 36.3 % (ref 40–53)
HCT VFR BLD AUTO: 36.6 % (ref 40–53)
HGB BLD-MCNC: 11.8 G/DL (ref 13.3–17.7)
HGB BLD-MCNC: 12.1 G/DL (ref 13.3–17.7)
HGB UR QL STRIP: NEGATIVE
IMM GRANULOCYTES # BLD: 0 10E9/L (ref 0–0.4)
IMM GRANULOCYTES # BLD: 0 10E9/L (ref 0–0.4)
IMM GRANULOCYTES NFR BLD: 0.1 %
IMM GRANULOCYTES NFR BLD: 0.2 %
KETONES UR STRIP-MCNC: NEGATIVE MG/DL
LACTATE BLD-SCNC: 1.3 MMOL/L (ref 0.7–2)
LEUKOCYTE ESTERASE UR QL STRIP: ABNORMAL
LYMPHOCYTES # BLD AUTO: 0.9 10E9/L (ref 0.8–5.3)
LYMPHOCYTES # BLD AUTO: 0.9 10E9/L (ref 0.8–5.3)
LYMPHOCYTES NFR BLD AUTO: 8.5 %
LYMPHOCYTES NFR BLD AUTO: 9 %
Lab: ABNORMAL
MCH RBC QN AUTO: 28.7 PG (ref 26.5–33)
MCH RBC QN AUTO: 28.9 PG (ref 26.5–33)
MCHC RBC AUTO-ENTMCNC: 32.5 G/DL (ref 31.5–36.5)
MCHC RBC AUTO-ENTMCNC: 33.1 G/DL (ref 31.5–36.5)
MCV RBC AUTO: 88 FL (ref 78–100)
MCV RBC AUTO: 88 FL (ref 78–100)
MONOCYTES # BLD AUTO: 0.6 10E9/L (ref 0–1.3)
MONOCYTES # BLD AUTO: 1 10E9/L (ref 0–1.3)
MONOCYTES NFR BLD AUTO: 5.8 %
MONOCYTES NFR BLD AUTO: 9.7 %
NEUTROPHILS # BLD AUTO: 8 10E9/L (ref 1.6–8.3)
NEUTROPHILS # BLD AUTO: 8.6 10E9/L (ref 1.6–8.3)
NEUTROPHILS NFR BLD AUTO: 79.7 %
NEUTROPHILS NFR BLD AUTO: 84.9 %
NITRATE UR QL: NEGATIVE
NRBC # BLD AUTO: 0 10*3/UL
NRBC # BLD AUTO: 0 10*3/UL
NRBC BLD AUTO-RTO: 0 /100
NRBC BLD AUTO-RTO: 0 /100
NT-PROBNP SERPL-MCNC: 8810 PG/ML (ref 0–900)
PH UR STRIP: 5 PH (ref 5–7)
PLATELET # BLD AUTO: 292 10E9/L (ref 150–450)
PLATELET # BLD AUTO: 301 10E9/L (ref 150–450)
POTASSIUM SERPL-SCNC: 3.7 MMOL/L (ref 3.4–5.3)
PROT SERPL-MCNC: 7.5 G/DL (ref 6.8–8.8)
RBC # BLD AUTO: 4.11 10E12/L (ref 4.4–5.9)
RBC # BLD AUTO: 4.18 10E12/L (ref 4.4–5.9)
RBC #/AREA URNS AUTO: <1 /HPF (ref 0–2)
SODIUM SERPL-SCNC: 136 MMOL/L (ref 133–144)
SOURCE: ABNORMAL
SP GR UR STRIP: 1 (ref 1–1.03)
SPECIMEN SOURCE: ABNORMAL
SQUAMOUS #/AREA URNS AUTO: <1 /HPF (ref 0–1)
TRANS CELLS #/AREA URNS HPF: <1 /HPF (ref 0–1)
TROPONIN I SERPL-MCNC: <0.015 UG/L (ref 0–0.04)
UROBILINOGEN UR STRIP-MCNC: NORMAL MG/DL (ref 0–2)
WBC # BLD AUTO: 10 10E9/L (ref 4–11)
WBC # BLD AUTO: 10.1 10E9/L (ref 4–11)
WBC #/AREA URNS AUTO: 3 /HPF (ref 0–5)

## 2019-01-17 PROCEDURE — 96376 TX/PRO/DX INJ SAME DRUG ADON: CPT | Performed by: EMERGENCY MEDICINE

## 2019-01-17 PROCEDURE — 71046 X-RAY EXAM CHEST 2 VIEWS: CPT

## 2019-01-17 PROCEDURE — 83880 ASSAY OF NATRIURETIC PEPTIDE: CPT | Performed by: EMERGENCY MEDICINE

## 2019-01-17 PROCEDURE — 25000128 H RX IP 250 OP 636: Performed by: EMERGENCY MEDICINE

## 2019-01-17 PROCEDURE — 21400000 ZZH R&B CCU UMMC

## 2019-01-17 PROCEDURE — 99285 EMERGENCY DEPT VISIT HI MDM: CPT | Mod: 25 | Performed by: EMERGENCY MEDICINE

## 2019-01-17 PROCEDURE — 93005 ELECTROCARDIOGRAM TRACING: CPT

## 2019-01-17 PROCEDURE — 83735 ASSAY OF MAGNESIUM: CPT | Performed by: EMERGENCY MEDICINE

## 2019-01-17 PROCEDURE — 25000132 ZZH RX MED GY IP 250 OP 250 PS 637: Performed by: STUDENT IN AN ORGANIZED HEALTH CARE EDUCATION/TRAINING PROGRAM

## 2019-01-17 PROCEDURE — 99223 1ST HOSP IP/OBS HIGH 75: CPT | Mod: GC | Performed by: INTERNAL MEDICINE

## 2019-01-17 PROCEDURE — 96365 THER/PROPH/DIAG IV INF INIT: CPT | Performed by: EMERGENCY MEDICINE

## 2019-01-17 PROCEDURE — 81001 URINALYSIS AUTO W/SCOPE: CPT | Performed by: EMERGENCY MEDICINE

## 2019-01-17 PROCEDURE — 25000128 H RX IP 250 OP 636: Performed by: STUDENT IN AN ORGANIZED HEALTH CARE EDUCATION/TRAINING PROGRAM

## 2019-01-17 PROCEDURE — 85025 COMPLETE CBC W/AUTO DIFF WBC: CPT | Performed by: EMERGENCY MEDICINE

## 2019-01-17 PROCEDURE — 84484 ASSAY OF TROPONIN QUANT: CPT | Performed by: EMERGENCY MEDICINE

## 2019-01-17 PROCEDURE — 96375 TX/PRO/DX INJ NEW DRUG ADDON: CPT | Performed by: EMERGENCY MEDICINE

## 2019-01-17 PROCEDURE — 93005 ELECTROCARDIOGRAM TRACING: CPT | Performed by: EMERGENCY MEDICINE

## 2019-01-17 PROCEDURE — 93010 ELECTROCARDIOGRAM REPORT: CPT | Performed by: INTERNAL MEDICINE

## 2019-01-17 PROCEDURE — 87086 URINE CULTURE/COLONY COUNT: CPT | Performed by: EMERGENCY MEDICINE

## 2019-01-17 PROCEDURE — 93010 ELECTROCARDIOGRAM REPORT: CPT | Mod: Z6 | Performed by: EMERGENCY MEDICINE

## 2019-01-17 PROCEDURE — 36415 COLL VENOUS BLD VENIPUNCTURE: CPT | Performed by: STUDENT IN AN ORGANIZED HEALTH CARE EDUCATION/TRAINING PROGRAM

## 2019-01-17 PROCEDURE — 00000146 ZZHCL STATISTIC GLUCOSE BY METER IP

## 2019-01-17 PROCEDURE — 84550 ASSAY OF BLOOD/URIC ACID: CPT | Performed by: EMERGENCY MEDICINE

## 2019-01-17 PROCEDURE — 85025 COMPLETE CBC W/AUTO DIFF WBC: CPT | Performed by: STUDENT IN AN ORGANIZED HEALTH CARE EDUCATION/TRAINING PROGRAM

## 2019-01-17 PROCEDURE — 83605 ASSAY OF LACTIC ACID: CPT | Performed by: EMERGENCY MEDICINE

## 2019-01-17 PROCEDURE — 80053 COMPREHEN METABOLIC PANEL: CPT | Performed by: EMERGENCY MEDICINE

## 2019-01-17 RX ORDER — AMLODIPINE BESYLATE 10 MG/1
10 TABLET ORAL DAILY
Status: DISCONTINUED | OUTPATIENT
Start: 2019-01-18 | End: 2019-01-23

## 2019-01-17 RX ORDER — PANTOPRAZOLE SODIUM 40 MG/1
40 TABLET, DELAYED RELEASE ORAL
Status: DISCONTINUED | OUTPATIENT
Start: 2019-01-18 | End: 2019-01-25 | Stop reason: HOSPADM

## 2019-01-17 RX ORDER — LISINOPRIL 20 MG/1
20 TABLET ORAL 2 TIMES DAILY
Status: DISCONTINUED | OUTPATIENT
Start: 2019-01-17 | End: 2019-01-17

## 2019-01-17 RX ORDER — SIMVASTATIN 40 MG
40 TABLET ORAL AT BEDTIME
Status: DISCONTINUED | OUTPATIENT
Start: 2019-01-17 | End: 2019-01-18

## 2019-01-17 RX ORDER — CLOPIDOGREL BISULFATE 75 MG/1
75 TABLET ORAL DAILY
Status: DISCONTINUED | OUTPATIENT
Start: 2019-01-18 | End: 2019-01-18

## 2019-01-17 RX ORDER — FUROSEMIDE 10 MG/ML
20 INJECTION INTRAMUSCULAR; INTRAVENOUS ONCE
Status: COMPLETED | OUTPATIENT
Start: 2019-01-17 | End: 2019-01-17

## 2019-01-17 RX ORDER — METOPROLOL SUCCINATE 50 MG/1
50 TABLET, EXTENDED RELEASE ORAL AT BEDTIME
Status: DISCONTINUED | OUTPATIENT
Start: 2019-01-17 | End: 2019-01-19

## 2019-01-17 RX ORDER — CHOLECALCIFEROL (VITAMIN D3) 50 MCG
1 TABLET ORAL DAILY
COMMUNITY
End: 2020-04-27

## 2019-01-17 RX ORDER — FUROSEMIDE 10 MG/ML
60 INJECTION INTRAMUSCULAR; INTRAVENOUS ONCE
Status: COMPLETED | OUTPATIENT
Start: 2019-01-17 | End: 2019-01-17

## 2019-01-17 RX ORDER — DEXTROSE MONOHYDRATE 25 G/50ML
25-50 INJECTION, SOLUTION INTRAVENOUS
Status: DISCONTINUED | OUTPATIENT
Start: 2019-01-17 | End: 2019-01-25 | Stop reason: HOSPADM

## 2019-01-17 RX ORDER — NICOTINE POLACRILEX 4 MG
15-30 LOZENGE BUCCAL
Status: DISCONTINUED | OUTPATIENT
Start: 2019-01-17 | End: 2019-01-25 | Stop reason: HOSPADM

## 2019-01-17 RX ORDER — ISOSORBIDE MONONITRATE 30 MG/1
30 TABLET, EXTENDED RELEASE ORAL DAILY
Status: DISCONTINUED | OUTPATIENT
Start: 2019-01-18 | End: 2019-01-25 | Stop reason: HOSPADM

## 2019-01-17 RX ORDER — HEPARIN SODIUM 5000 [USP'U]/.5ML
5000 INJECTION, SOLUTION INTRAVENOUS; SUBCUTANEOUS EVERY 12 HOURS
Status: DISCONTINUED | OUTPATIENT
Start: 2019-01-17 | End: 2019-01-18

## 2019-01-17 RX ADMIN — SIMVASTATIN 40 MG: 40 TABLET, FILM COATED ORAL at 23:37

## 2019-01-17 RX ADMIN — FUROSEMIDE 20 MG: 10 INJECTION, SOLUTION INTRAVENOUS at 18:21

## 2019-01-17 RX ADMIN — Medication 5000 UNITS: at 23:37

## 2019-01-17 RX ADMIN — METOPROLOL SUCCINATE 50 MG: 50 TABLET, EXTENDED RELEASE ORAL at 23:36

## 2019-01-17 RX ADMIN — FUROSEMIDE 60 MG: 10 INJECTION, SOLUTION INTRAVENOUS at 20:22

## 2019-01-17 RX ADMIN — FUROSEMIDE 10 MG/HR: 10 INJECTION, SOLUTION INTRAVENOUS at 20:42

## 2019-01-17 RX ADMIN — LEVOTHYROXINE SODIUM 137 MCG: 25 TABLET ORAL at 23:35

## 2019-01-17 RX ADMIN — AMOXICILLIN AND CLAVULANATE POTASSIUM 1 TABLET: 875; 125 TABLET, FILM COATED ORAL at 23:37

## 2019-01-17 ASSESSMENT — MIFFLIN-ST. JEOR: SCORE: 2050.85

## 2019-01-17 NOTE — TELEPHONE ENCOUNTER
Arnaldo Solares is calling and has an appointment today at 3:30.  Creatine levels went up since getting antibiotic.  Jayro has gained 22 pounds of water weight.   Son is calling with questions.    Last night Jayro had a hard time breathing.      Reason for Disposition    Patient sounds very sick or weak to the triager    Additional Information    Negative: Severe difficulty breathing (e.g., struggling for each breath, speaks in single words)    Negative: Looks like a broken bone or dislocated joint (e.g., crooked or deformed)    Negative: Sounds like a life-threatening emergency to the triager    Negative: Difficulty breathing at rest    Negative: Entire foot is cool or blue in comparison to other side    Negative: [1] Can't walk or can barely walk AND [2] new onset    Negative: [1] Difficulty breathing with exertion (e.g., walking) AND [2] new onset or worsening    Negative: [1] Red area or streak AND [2] fever    Negative: [1] Swelling is painful to touch AND [2] fever    Negative: [1] Cast on leg or ankle AND [2] now increased pain    Protocols used: LEG SWELLING AND EDEMA-ADULT-

## 2019-01-17 NOTE — ED NOTES
Initial Assessment: VSS. Pleasant and co-op. No c/o discomfort at this time. Denies SOB at this time. Pt on room air with SaO2 > 98%. Communicates needs without difficulty. Denies Chest/shoulder/arm pain or discomfort.

## 2019-01-17 NOTE — LETTER
January 17, 2019      To Whom It May Concern:      Jayro MESSINA Cristoballala was seen in our Emergency Department today, 01/17/19 and was accompanied by his son Beck throughout the day in the ED     Sincerely,        Vinnie Agrawal MD

## 2019-01-17 NOTE — TELEPHONE ENCOUNTER
Wife Aixa called and left a detailed v.m. About her , who has been in the hospital toe amputations and kidney problems due to Vancomycin, she is concerned about wt gain 22 pounds in a week and has been followed by Nephrology and PMD post hospital.  Cardiology was not consulted during hospital stay.  Patient also c/o Shortness of breath and is a former Dr. Mckeon patient and is wondering if he needs to see Cardiology sooner.  Reviewed chart and patient is seeing PMD today.  Returned call and left message if PMD feels patient needs to be seen sooner to call [facility] clinic or call back to Team 3 if further questions or concerns.

## 2019-01-17 NOTE — ED PROVIDER NOTES
History     Chief Complaint   Patient presents with     Shortness of Breath     HPI  Jayro MAYURI Elder is a 68 year old male with a history of type II diabetes complicated by prior diabetic foot infections, coronary artery disease with stents, and associated ischemic cardiomyopathy (most recent echocardiogram on 9/14/2018 showing EF of 35-40%), atrial fibrillation (on chronic apixaban), untreated MARISOL, with recent hospitalization (1/5/2019 to 1/14/2019) with necrotic appearing right second toe with surrounding foot and leg erythema consistent with cellulitis and found to have osteomyelitis requiring antibiotics and successive amputations, who presents to the Emergency Department for evaluation of shortness of breath.     During that hospitalization he developed acute renal failure thought to be related to antibiotics.  He had a significant GFR decrease.  At home he has been complaining of orthopnea, very challenging sleep due to inability to lay flat.  Lower extremity edema, which she has never experienced before by his history.    Denies any chest pain.  No fevers.  Occasional chills.      I have reviewed the Medications, Allergies, Past Medical and Surgical History, and Social History in the [employer] system.    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     Cardiomyopathy (H)      Cellulitis of left lower extremity 3/13/2018     Cerebral infarction (H)     eye sight decreased in peripheral of right side and blurry     Diabetic ulcer of left midfoot associated with type 2 diabetes mellitus, with fat layer exposed (H) 3/13/2018     Essential hypertension, benign 11/13/2002     Generalized osteoarthrosis, unspecified site 11/13/2002     Microalbuminuria 3/13/2018    X1     Myocardial infarction (H)      Neuropathy      Sepsis (H)      Sleep apnea     doesn't use it     Substance  abuse (H)      Syncope, unspecified syncope type 3/13/2018     Thyroid disease     takes medicine     Tobacco use disorder 11/13/2002     Type 2 diabetes mellitus with diabetic peripheral angiopathy without gangrene, unspecified long term insulin use status 3/13/2018     Type II or unspecified type diabetes mellitus without mention of complication, not stated as uncontrolled 7/14/2005       Past Surgical History:   Procedure Laterality Date     AMPUTATE TOE(S) Right 1/6/2019    Procedure: Right open second toe partial amputation;  Surgeon: Sabino Garcia DPM;  Location: [facility] OR     AMPUTATE TOE(S) Right 1/8/2019    Procedure: 1.  Revision right second toe amputation with resection of distal half of proximal phalanx with full closure.    2.  Debridement of callus/preulcerative lesion, distal left second toe and plantar left first metatarsophalangeal joint.;  Surgeon: Ryan Bhagat DPM;  Location: [facility] OR     ANGIOGRAM  6/29/05    subtotal occ.mid LAD,SUSAN mid LAD     ANGIOGRAM  7/05    mild CAD,patent stent,no flow-limiting lesions,sev.LV dysf.LAD enlarged     ANGIOGRAM  2/09    Sev.single vessel disease,Mod LV dysf.distal LAD 90%,70-75% mid lad just before prev stent,SUSAN to prox.mid LAD, endeavor to distal LAD     ANGIOGRAM  11/13/13    restenosis, stent LAD     BACK SURGERY      back surgery x3     C NONSPECIFIC PROCEDURE      Laminectomy x 3 - (1983 x 2 & 1990)     C NONSPECIFIC PROCEDURE Bilateral 1998    Bunionectomy     C NONSPECIFIC PROCEDURE  1959    Gingival surgery at age 9     HEART CATH LEFT HEART CATH  06/13/2017    SUSAN to OM3     INCISION AND DRAINAGE TOE, COMBINED Bilateral 9/11/2018    Procedure: COMBINED INCISION AND DRAINAGE TOE;  1) Irrigation and debridement ulcer left great toe  2) Amputation 3rd toe right foot at distal interphalangeal joint level;  Surgeon: Miki Harp MD;  Location: [facility] OR     ORTHOPEDIC SURGERY      bunion surgery both feet     ORTHOPEDIC SURGERY      left  shoulder     SOFT TISSUE SURGERY      debridement of toe numerous time     VASCULAR SURGERY      7 stents in heart       Family History   Problem Relation Age of Onset     Cancer - colorectal Sister      Thyroid Disease Brother      Cardiovascular Brother      Diabetes Brother      Cancer Father         tumor in chest and throat     Arthritis Mother      Thyroid Disease Mother      Diabetes Mother      Glaucoma No family hx of      Macular Degeneration No family hx of        Social History     Tobacco Use     Smoking status: Former Smoker     Packs/day: 1.00     Years: 25.00     Pack years: 25.00     Types: Cigarettes     Last attempt to quit: 2005     Years since quittin.4     Smokeless tobacco: Never Used   Substance Use Topics     Alcohol use: Yes     Alcohol/week: 2.4 oz     Types: 4 Shots of liquor per week     Comment: almost every day     Current Facility-Administered Medications   Medication     furosemide (LASIX) injection 20 mg     Current Outpatient Medications   Medication     amLODIPine (NORVASC) 5 MG tablet     amoxicillin-clavulanate (AUGMENTIN) 875-125 MG tablet     apixaban ANTICOAGULANT (ELIQUIS) 5 MG tablet     Cholecalciferol (D3 ADULT PO)     clopidogrel (PLAVIX) 75 MG tablet     insulin aspart (NOVOLOG FLEXPEN) 100 UNIT/ML pen     insulin glargine U-300 (TOUJEO) 300 UNIT/ML injection     insulin pen needle 31G X 6 MM     isosorbide mononitrate (IMDUR) 30 MG 24 hr tablet     levothyroxine (SYNTHROID, LEVOTHROID) 137 MCG tablet     lisinopril (PRINIVIL/ZESTRIL) 20 MG tablet     metoprolol succinate (TOPROL-XL) 100 MG 24 hr tablet     pantoprazole (PROTONIX) 40 MG enteric coated tablet     simvastatin (ZOCOR) 40 MG tablet     nitroglycerin (NITROSTAT) 0.4 MG sublingual tablet     order for DME     order for DME        Allergies   Allergen Reactions     No Known Allergies          Review of Systems    14 point review of symptoms was performed and is negative except as noted above.      Physical Exam   BP: (!) 167/98  Heart Rate: 91  Temp: 97.7  F (36.5  C)  Resp: 16  Weight: 121.7 kg (268 lb 4.8 oz)  SpO2: 95 %      Physical Exam    GEN: Well appearing, non toxic, cooperative and conversant.   HEENT: The head is normocephalic and atraumatic. Pupils are equal round and reactive to light. Extraocular motions are intact. There is no facial swelling. The neck is nontender and supple.   CV: Regular rate and rhythm without murmurs rubs or gallops. 2+ radial pulses bilaterally.  PULM: Clear to auscultation bilaterally, with occasional basilar crackles  ABD: Obese, soft, nontender, stented.   EXT: Full range of motion.  3-4+ lower extremity edema which extends above the knees along the posterior medial thighs.  NEURO: Cranial nerves II through XII are intact and symmetric. Bilateral upper and lower extremities grossly show full range of motion without any focal deficits.   PSYCH: Calm and cooperative, interactive.     ED Course        Procedures            EKG at 16:14.    Shortness of breath at time of ECG.  ECG interpretted by me within 10 minutes of production  Atrial fibrillation at 88 bpm. Normal axis.  Incomplete right bundle branch block. Nl R-wave progress. No ST-T elevations or depressions. Pathologic T-wave inversion are absent     Interpretation: Unchanged QRS complex, or other significant change compared to previous, abnormal ECG           Labs Ordered and Resulted from Time of ED Arrival Up to the Time of Departure from the ED   CBC WITH PLATELETS DIFFERENTIAL - Abnormal; Notable for the following components:       Result Value    RBC Count 4.18 (*)     Hemoglobin 12.1 (*)     Hematocrit 36.6 (*)     Absolute Neutrophil 8.6 (*)     All other components within normal limits   COMPREHENSIVE METABOLIC PANEL - Abnormal; Notable for the following components:    Glucose 317 (*)     Creatinine 2.91 (*)     GFR Estimate 21 (*)     GFR Estimate If Black 24 (*)     Calcium 8.4 (*)     Albumin 3.3  (*)     All other components within normal limits   NT PROBNP INPATIENT - Abnormal; Notable for the following components:    N-Terminal Pro BNP Inpatient 8,810 (*)     All other components within normal limits   TROPONIN I   LACTIC ACID WHOLE BLOOD   ROUTINE UA WITH MICROSCOPIC REFLEX TO CULTURE   URINE CULTURE AEROBIC BACTERIAL       Consults  Cardiology: Responded (01/17/19 1801)    Assessments & Plan (with Medical Decision Making)   69-year-old male with multiple medical problems including coronary artery disease, ischemic cardiomyopathy, reduced ejection fraction recent HPI multifactorial presenting with orthopnea, shortness of breath and lower extremity edema    Broad differential is considered including ACS, pulmonary embolism, acute infection including pneumonia, sepsis, heart failure, DVTs, among other causes.    Laboratories are reviewed including a negative troponin, BNP of almost 9000    ECG is stable, nonischemic appearing  Impression is fluid overload with associated CHF  -We will start gentle diuresis given recent ABNER    -Discussed and admitted to cardiology    I have reviewed the nursing notes.    I have reviewed the findings, diagnosis, plan and need for follow up with the patient.       Medication List      There are no discharge medications for this visit.         Final diagnoses:   Fluid overload   Acute systolic congestive heart failure (H)       1/17/2019   [facility], [place], EMERGENCY DEPARTMENT     Vinnie Agrawal MD  01/17/19 9133

## 2019-01-17 NOTE — ED TRIAGE NOTES
"Pt arrived to the ED with c/o \"cant breath very well.\" Per pt and son he had a foot surgery with IV antibiotics at the beginning of January. Per pt and son \"the antibiotic took a hit to the kidneys.\" On arrival vitals stable, afebrile. Pt does have abdominal and bilateral arm and leg edema. Pt reports SOA since Monday. Pt also reports a lack of appetite for a week. EKG done in triage. Pt reports about a 22 lb weight gain.   "

## 2019-01-18 ENCOUNTER — PATIENT OUTREACH (OUTPATIENT)
Dept: CARE COORDINATION | Facility: CLINIC | Age: 69
End: 2019-01-18

## 2019-01-18 ENCOUNTER — APPOINTMENT (OUTPATIENT)
Dept: OCCUPATIONAL THERAPY | Facility: CLINIC | Age: 69
DRG: 292 | End: 2019-01-18
Payer: COMMERCIAL

## 2019-01-18 ENCOUNTER — APPOINTMENT (OUTPATIENT)
Dept: CARDIOLOGY | Facility: CLINIC | Age: 69
DRG: 292 | End: 2019-01-18
Payer: COMMERCIAL

## 2019-01-18 LAB
ANION GAP SERPL CALCULATED.3IONS-SCNC: 11 MMOL/L (ref 3–14)
ANION GAP SERPL CALCULATED.3IONS-SCNC: 12 MMOL/L (ref 3–14)
BACTERIA SPEC CULT: NO GROWTH
BUN SERPL-MCNC: 26 MG/DL (ref 7–30)
BUN SERPL-MCNC: 28 MG/DL (ref 7–30)
CALCIUM SERPL-MCNC: 8.3 MG/DL (ref 8.5–10.1)
CALCIUM SERPL-MCNC: 8.5 MG/DL (ref 8.5–10.1)
CHLORIDE SERPL-SCNC: 100 MMOL/L (ref 94–109)
CHLORIDE SERPL-SCNC: 103 MMOL/L (ref 94–109)
CHOLEST SERPL-MCNC: 81 MG/DL
CO2 SERPL-SCNC: 25 MMOL/L (ref 20–32)
CO2 SERPL-SCNC: 25 MMOL/L (ref 20–32)
CREAT SERPL-MCNC: 2.92 MG/DL (ref 0.66–1.25)
CREAT SERPL-MCNC: 3.14 MG/DL (ref 0.66–1.25)
GFR SERPL CREATININE-BSD FRML MDRD: 19 ML/MIN/{1.73_M2}
GFR SERPL CREATININE-BSD FRML MDRD: 21 ML/MIN/{1.73_M2}
GLUCOSE BLDC GLUCOMTR-MCNC: 234 MG/DL (ref 70–99)
GLUCOSE BLDC GLUCOMTR-MCNC: 245 MG/DL (ref 70–99)
GLUCOSE BLDC GLUCOMTR-MCNC: 248 MG/DL (ref 70–99)
GLUCOSE BLDC GLUCOMTR-MCNC: 285 MG/DL (ref 70–99)
GLUCOSE BLDC GLUCOMTR-MCNC: 293 MG/DL (ref 70–99)
GLUCOSE SERPL-MCNC: 227 MG/DL (ref 70–99)
GLUCOSE SERPL-MCNC: 237 MG/DL (ref 70–99)
HDLC SERPL-MCNC: 32 MG/DL
INR PPP: 1.28 (ref 0.86–1.14)
INTERPRETATION ECG - MUSE: NORMAL
INTERPRETATION ECG - MUSE: NORMAL
LDLC SERPL CALC-MCNC: 33 MG/DL
Lab: NORMAL
MAGNESIUM SERPL-MCNC: 1.8 MG/DL (ref 1.6–2.3)
MAGNESIUM SERPL-MCNC: 1.8 MG/DL (ref 1.6–2.3)
MAGNESIUM SERPL-MCNC: 2 MG/DL (ref 1.6–2.3)
NONHDLC SERPL-MCNC: 49 MG/DL
POTASSIUM SERPL-SCNC: 3.1 MMOL/L (ref 3.4–5.3)
POTASSIUM SERPL-SCNC: 3.2 MMOL/L (ref 3.4–5.3)
SODIUM SERPL-SCNC: 137 MMOL/L (ref 133–144)
SODIUM SERPL-SCNC: 139 MMOL/L (ref 133–144)
SPECIMEN SOURCE: NORMAL
TRIGL SERPL-MCNC: 83 MG/DL
URATE SERPL-MCNC: 7.5 MG/DL (ref 3.5–7.2)

## 2019-01-18 PROCEDURE — 83735 ASSAY OF MAGNESIUM: CPT | Performed by: HOSPITALIST

## 2019-01-18 PROCEDURE — 25000128 H RX IP 250 OP 636: Performed by: STUDENT IN AN ORGANIZED HEALTH CARE EDUCATION/TRAINING PROGRAM

## 2019-01-18 PROCEDURE — 97165 OT EVAL LOW COMPLEX 30 MIN: CPT | Mod: GO | Performed by: OCCUPATIONAL THERAPIST

## 2019-01-18 PROCEDURE — 97110 THERAPEUTIC EXERCISES: CPT | Mod: GO | Performed by: OCCUPATIONAL THERAPIST

## 2019-01-18 PROCEDURE — 25500064 ZZH RX 255 OP 636: Performed by: INTERNAL MEDICINE

## 2019-01-18 PROCEDURE — 40000133 ZZH STATISTIC OT WARD VISIT: Performed by: OCCUPATIONAL THERAPIST

## 2019-01-18 PROCEDURE — 25000132 ZZH RX MED GY IP 250 OP 250 PS 637: Performed by: HOSPITALIST

## 2019-01-18 PROCEDURE — 25000132 ZZH RX MED GY IP 250 OP 250 PS 637: Performed by: INTERNAL MEDICINE

## 2019-01-18 PROCEDURE — 80061 LIPID PANEL: CPT | Performed by: STUDENT IN AN ORGANIZED HEALTH CARE EDUCATION/TRAINING PROGRAM

## 2019-01-18 PROCEDURE — 85610 PROTHROMBIN TIME: CPT | Performed by: STUDENT IN AN ORGANIZED HEALTH CARE EDUCATION/TRAINING PROGRAM

## 2019-01-18 PROCEDURE — 25000128 H RX IP 250 OP 636: Performed by: HOSPITALIST

## 2019-01-18 PROCEDURE — 25000131 ZZH RX MED GY IP 250 OP 636 PS 637: Performed by: STUDENT IN AN ORGANIZED HEALTH CARE EDUCATION/TRAINING PROGRAM

## 2019-01-18 PROCEDURE — 25000132 ZZH RX MED GY IP 250 OP 250 PS 637: Performed by: STUDENT IN AN ORGANIZED HEALTH CARE EDUCATION/TRAINING PROGRAM

## 2019-01-18 PROCEDURE — 25000131 ZZH RX MED GY IP 250 OP 636 PS 637: Performed by: HOSPITALIST

## 2019-01-18 PROCEDURE — 80048 BASIC METABOLIC PNL TOTAL CA: CPT | Performed by: STUDENT IN AN ORGANIZED HEALTH CARE EDUCATION/TRAINING PROGRAM

## 2019-01-18 PROCEDURE — 21400000 ZZH R&B CCU UMMC

## 2019-01-18 PROCEDURE — 00000146 ZZHCL STATISTIC GLUCOSE BY METER IP

## 2019-01-18 PROCEDURE — 80048 BASIC METABOLIC PNL TOTAL CA: CPT | Performed by: HOSPITALIST

## 2019-01-18 PROCEDURE — 40000264 ECHOCARDIOGRAM COMPLETE

## 2019-01-18 PROCEDURE — 25000132 ZZH RX MED GY IP 250 OP 250 PS 637: Performed by: NURSE PRACTITIONER

## 2019-01-18 PROCEDURE — 93306 TTE W/DOPPLER COMPLETE: CPT | Mod: 26 | Performed by: INTERNAL MEDICINE

## 2019-01-18 PROCEDURE — 36415 COLL VENOUS BLD VENIPUNCTURE: CPT | Performed by: HOSPITALIST

## 2019-01-18 PROCEDURE — 83735 ASSAY OF MAGNESIUM: CPT | Performed by: STUDENT IN AN ORGANIZED HEALTH CARE EDUCATION/TRAINING PROGRAM

## 2019-01-18 PROCEDURE — 36415 COLL VENOUS BLD VENIPUNCTURE: CPT | Performed by: STUDENT IN AN ORGANIZED HEALTH CARE EDUCATION/TRAINING PROGRAM

## 2019-01-18 PROCEDURE — 99233 SBSQ HOSP IP/OBS HIGH 50: CPT | Mod: 25 | Performed by: INTERNAL MEDICINE

## 2019-01-18 RX ORDER — POTASSIUM CHLORIDE 20MEQ/15ML
20 LIQUID (ML) ORAL EVERY 8 HOURS
Status: DISPENSED | OUTPATIENT
Start: 2019-01-18 | End: 2019-01-19

## 2019-01-18 RX ORDER — HYDRALAZINE HYDROCHLORIDE 20 MG/ML
10 INJECTION INTRAMUSCULAR; INTRAVENOUS EVERY 6 HOURS PRN
Status: DISCONTINUED | OUTPATIENT
Start: 2019-01-18 | End: 2019-01-25 | Stop reason: HOSPADM

## 2019-01-18 RX ORDER — HEPARIN SODIUM 1000 [USP'U]/ML
5000 INJECTION, SOLUTION INTRAVENOUS; SUBCUTANEOUS ONCE
Status: COMPLETED | OUTPATIENT
Start: 2019-01-18 | End: 2019-01-18

## 2019-01-18 RX ORDER — CLOPIDOGREL BISULFATE 75 MG/1
75 TABLET ORAL DAILY
Status: DISCONTINUED | OUTPATIENT
Start: 2019-01-18 | End: 2019-01-21

## 2019-01-18 RX ORDER — POTASSIUM CHLORIDE 1.5 G/1.58G
40 POWDER, FOR SOLUTION ORAL ONCE
Status: COMPLETED | OUTPATIENT
Start: 2019-01-18 | End: 2019-01-18

## 2019-01-18 RX ORDER — ATORVASTATIN CALCIUM 40 MG/1
40 TABLET, FILM COATED ORAL EVERY EVENING
Status: DISCONTINUED | OUTPATIENT
Start: 2019-01-18 | End: 2019-01-25 | Stop reason: HOSPADM

## 2019-01-18 RX ORDER — MAGNESIUM SULFATE 1 G/100ML
1 INJECTION INTRAVENOUS ONCE
Status: COMPLETED | OUTPATIENT
Start: 2019-01-18 | End: 2019-01-18

## 2019-01-18 RX ORDER — POTASSIUM CHLORIDE 750 MG/1
20 TABLET, EXTENDED RELEASE ORAL ONCE
Status: COMPLETED | OUTPATIENT
Start: 2019-01-18 | End: 2019-01-18

## 2019-01-18 RX ADMIN — POTASSIUM CHLORIDE 40 MEQ: 1.5 POWDER, FOR SOLUTION ORAL at 20:25

## 2019-01-18 RX ADMIN — AMOXICILLIN AND CLAVULANATE POTASSIUM 1 TABLET: 875; 125 TABLET, FILM COATED ORAL at 21:45

## 2019-01-18 RX ADMIN — AMOXICILLIN AND CLAVULANATE POTASSIUM 1 TABLET: 875; 125 TABLET, FILM COATED ORAL at 11:47

## 2019-01-18 RX ADMIN — CLOPIDOGREL BISULFATE 75 MG: 75 TABLET ORAL at 08:39

## 2019-01-18 RX ADMIN — AMLODIPINE BESYLATE 10 MG: 10 TABLET ORAL at 08:39

## 2019-01-18 RX ADMIN — POTASSIUM CHLORIDE 20 MEQ: 20 SOLUTION ORAL at 11:47

## 2019-01-18 RX ADMIN — FUROSEMIDE 10 MG/HR: 10 INJECTION, SOLUTION INTRAVENOUS at 06:28

## 2019-01-18 RX ADMIN — APIXABAN 5 MG: 5 TABLET, FILM COATED ORAL at 20:25

## 2019-01-18 RX ADMIN — HEPARIN SODIUM 5000 UNITS: 1000 INJECTION, SOLUTION INTRAVENOUS; SUBCUTANEOUS at 11:48

## 2019-01-18 RX ADMIN — METOPROLOL SUCCINATE 50 MG: 50 TABLET, EXTENDED RELEASE ORAL at 21:46

## 2019-01-18 RX ADMIN — HUMAN ALBUMIN MICROSPHERES AND PERFLUTREN 6 ML: 10; .22 INJECTION, SOLUTION INTRAVENOUS at 08:15

## 2019-01-18 RX ADMIN — LEVOTHYROXINE SODIUM 137 MCG: 25 TABLET ORAL at 21:45

## 2019-01-18 RX ADMIN — ISOSORBIDE MONONITRATE 30 MG: 30 TABLET, EXTENDED RELEASE ORAL at 08:39

## 2019-01-18 RX ADMIN — POTASSIUM CHLORIDE 20 MEQ: 750 TABLET, EXTENDED RELEASE ORAL at 19:09

## 2019-01-18 RX ADMIN — INSULIN ASPART 2 UNITS: 100 INJECTION, SOLUTION INTRAVENOUS; SUBCUTANEOUS at 00:41

## 2019-01-18 RX ADMIN — INSULIN GLARGINE 10 UNITS: 100 INJECTION, SOLUTION SUBCUTANEOUS at 14:50

## 2019-01-18 RX ADMIN — MAGNESIUM SULFATE IN DEXTROSE 1 G: 10 INJECTION, SOLUTION INTRAVENOUS at 21:44

## 2019-01-18 RX ADMIN — ATORVASTATIN CALCIUM 40 MG: 40 TABLET, FILM COATED ORAL at 20:25

## 2019-01-18 RX ADMIN — PANTOPRAZOLE SODIUM 40 MG: 40 TABLET, DELAYED RELEASE ORAL at 08:39

## 2019-01-18 ASSESSMENT — ACTIVITIES OF DAILY LIVING (ADL)
PREVIOUS_RESPONSIBILITIES: DRIVING;FINANCES;MEDICATION MANAGEMENT;SHOPPING;LAUNDRY;HOUSEKEEPING;MEAL PREP
ADLS_ACUITY_SCORE: 15

## 2019-01-18 ASSESSMENT — MIFFLIN-ST. JEOR: SCORE: 2026.36

## 2019-01-18 NOTE — PROGRESS NOTES
CLINICAL NUTRITION SERVICES    Reason for Assessment:  Low-sodium (2 g/day) nutrition education, received consult    Diet History:  Pt reports receiving some low-sodium nutrition education in the past. Aware of some high sodium foods. States he does not add salt to foods often. Pt thought sea salt was low in sodium. States he monitors his carbohydrate intake and restricts his intake of bread (DM hx). His wife does the shopping and cooking.     Nutrition Diagnosis:  Food- and nutrition-related knowledge deficit r/t no previous knowledge of low-sodium diet AEB pt report of no previous formal low-sodium nutrition education.    Nutrition Prescription/Recs:  Continue low-sodium diet. Fluid restriction as per team.       Interventions:  Nutrition Education  1. Provided verbal instruction on a heart-healthy diet with emphasis on low-sodium meal planning. Gave examples of high sodium foods/beverages to limit and low sodium foods/beverages to choose. Explained fluid restriction.   2. Provided the following handouts: How to Read Nutrition Labels, Low-Sodium Foods and Drinks, Tips for a Low-Sodium Diet, Seasoning Your Foods Without Adding Salt, and Managing Fluid Restriction.      Goals:    Pt will verbalize at least five high sodium foods and the importance of avoiding added salt to foods for cooking or seasoning foods.     Follow-up:   Patient to ask any further nutrition-related questions before discharge. In addition, pt may request outpatient RD appointment.     Melissa King, MS, RD, LD, [facility]   6C Pgr: [phone]

## 2019-01-18 NOTE — PROGRESS NOTES
[facility]   Cardiology   Progress Note     Interval History:  - Patient reports SOB greatly improving  - Continues to endorse BLE edema  -  Denies any chest pain, chest tightness, lightheadedness or dizziness  - Net negative 3.7 L on Lasix gtt  - Weight today 254#, down from 268 # (-245#)    Physical Exam:  Temp:  [97.5  F (36.4  C)-98.5  F (36.9  C)] 98  F (36.7  C)  Pulse:  [] 85  Heart Rate:  [72-93] 83  Resp:  [16-20] 18  BP: (128-168)/() 153/104  SpO2:  [93 %-97 %] 93 %    I/O:  Intake/Output Summary (Last 24 hours) at 1/18/2019 1645  Last data filed at 1/18/2019 1600  Gross per 24 hour   Intake 600 ml   Output 4300 ml   Net -3700 ml     Wt:   Wt Readings from Last 5 Encounters:   01/18/19 115.5 kg (254 lb 9.6 oz)   01/14/19 119.3 kg (263 lb)   09/06/18 115.1 kg (253 lb 11.2 oz)   03/12/18 114.3 kg (252 lb)   03/03/18 109.6 kg (241 lb 9.6 oz)       General: NAD  HEENT:  PERRLA, EOMI. Sclera is non-icteric and conjunctiva is pink with no erythema. Oral mucosa moist, no oral lesions, good dentition.  Neck: JVD visible to L ear. No cervical/supraclavicular LAD.   CV: Irregular rhythm. Regular rate. No murmur appreciated. No rubs or gallops. Peripheral radial pulse intact.  Resp: No increased work of breathing or use of accessory muscles, breathing comfortably on room air.  Lung sounds clear throughout/bilaterally  Abdomen: No obvious scars or hernias. Normal active bowel sounds.  Abdomen is soft. No distension, non-tender to palpation. No rebound tenderness or guarding. percussion WNL  :  No suprapubic tenderness.  MSK:  Upper and lower extremity muscle strength intact 5/5 bilaterally.     Extremities: Warm. Capillary refill less than 3 sec. 4+/4 radial pulses bilaterally.  4+/4 pedal pulses bilaterally.  No cyanosis or clubbing. 2+ pitting edema in lower extremities.  Lymphatic: No cervical, supraclavicular LAD.  Skin:  Warm and dry. No erythema, rashes,  ulceration or diaphoresis.  Neuro: CN II-XII grossly intact. Alert and oriented x3.  Moving all extremities equally.    Medications:    amLODIPine  10 mg Oral Daily     amoxicillin-clavulanate  1 tablet Oral Q12H     apixaban ANTICOAGULANT  5 mg Oral BID     clopidogrel  75 mg Oral Daily     insulin aspart  1-10 Units Subcutaneous TID AC     insulin aspart  1-7 Units Subcutaneous At Bedtime     insulin glargine  10 Units Subcutaneous QAM AC     isosorbide mononitrate  30 mg Oral Daily     levothyroxine  137 mcg Oral At Bedtime     metoprolol succinate ER  50 mg Oral At Bedtime     pantoprazole  40 mg Oral QAM AC     potassium chloride  20 mEq Oral Q8H     simvastatin  40 mg Oral At Bedtime       - MEDICATION INSTRUCTIONS -         Labs:   CMP  Recent Labs   Lab 01/18/19  0611 01/17/19  1652 01/14/19  0610 01/13/19  0702    136 138 142   POTASSIUM 3.2* 3.7 3.7 3.6   CHLORIDE 103 103 108 109   CO2 25 21 21 23   ANIONGAP 11 12 9 10   * 317* 167* 118*   BUN 26 25 21 18   CR 2.92* 2.91* 3.40* 3.45*   GFRESTIMATED 21* 21* 17* 17*   GFRESTBLACK 24* 24* 20* 20*   BETTIE 8.5 8.4* 8.6 8.0*   MAG 1.8 2.0  --   --    PROTTOTAL  --  7.5  --  6.0*   ALBUMIN  --  3.3*  --  2.7*   BILITOTAL  --  1.2  --  0.5   ALKPHOS  --  77  --  66   AST  --  10  --  10   ALT  --  17  --  15     CBC  Recent Labs   Lab 01/17/19  2222 01/17/19  1652 01/14/19  0610 01/13/19  0702   WBC 10.0 10.1 8.9 6.8   RBC 4.11* 4.18* 4.23* 4.08*   HGB 11.8* 12.1* 12.1* 11.6*   HCT 36.3* 36.6* 37.7* 36.5*   MCV 88 88 89 90   MCH 28.7 28.9 28.6 28.4   MCHC 32.5 33.1 32.1 31.8   RDW 13.1 13.2 13.0 12.9    301 279 241     INR  Recent Labs   Lab 01/18/19  0611   INR 1.28*     Echo 1/18/19:  Recent Results (from the past 4320 hour(s))   Echocardiogram Complete    Narrative    [id number]  [id number]  [id number]  [id number]^WILL^LUNA           [facility],[place]  Echocardiography Laboratory  [address] [place] [address]  [id number]     Name: PORTER YANCEY  MRN: [id number]  : 1950  Study Date: 2019 07:41 AM  Age: 68 yrs  Gender: Male  [place] [facility] [place] Study: CHF  Ordering Physician: LUNA SOLIS  Referring Physician: LUNA SOLIS  Performed By: Ree Valdez     BSA: 2.5 m2  Height: 76 in  Weight: 254 lb  HR: 78  BP: 136/77 mmHg  _____________________________________________________________________________  __        Procedure  Complete Portable Echo Adult. Contrast Optison. Patient was given 6 ml mixture  of 3 ml Optison and 6 ml saline. 3 ml wasted.  _____________________________________________________________________________  __        Interpretation Summary  Moderately (EF 35-40%) reduced left ventricular function is present. Regional  wall abnormality involving the septum, apex and inferior walls. No LV apical  thrombus.  Global right ventricular function is normal. The right ventricle is normal  size.  The inferior vena cava was dilated at 2.6 cm without respiratory variability,  consistent with increased right atrial pressure.  Estimated mean right atrial pressure is 15 mmHg (significantly elevated).  No significant valvular dysfunction.  No pericardial effusion is present.  Rhythm was atrial fibrillation during the study.     This study was compared with the study from 2018 .  IVC doesnot collapse in this study. RA pressure is more elevated. RWA is  similar.     _____________________________________________________________________________  __        Left Ventricle  Left ventricular size is normal. Left ventricular wall thickness is normal.  Diastolic function not assessed due to atrial fibrillation. Moderately (EF 35-  40%) reduced left ventricular function is present. Mid anteroseptum akinetic.  Inferior wall hypokinesis is present. Apical wall hypokinesis is present.     Right Ventricle  Global right ventricular function is normal. The right ventricle is normal  size.      Atria  Moderate left atrial enlargement is present. Moderate right atrial enlargement  is present. The atrial septum is intact as assessed by color Doppler .     Mitral Valve  The mitral valve is normal. Trace to mild mitral insufficiency is present.        Aortic Valve  Mild aortic valve sclerosis is present. The aortic valve is tricuspid. Trace  aortic insufficiency is present.     Tricuspid Valve  Trace to mild tricuspid insufficiency is present. Pulmonary artery systolic  pressure cannot be assessed.     Pulmonic Valve  The pulmonic valve is normal.     Vessels  The aorta root is normal. Estimated mean right atrial pressure is 15 mmHg  (significantly elevated). The inferior vena cava was dilated at 2.6 cm without  respiratory variability, consistent with increased right atrial pressure.     Pericardium  No pericardial effusion is present.        Compared to Previous Study  This study was compared with the study from 2018 . IVC doesnot collapse in  this study. RA pressure is more elevated. Otherwise direct comparison of the  images doesnot suggest significant change. RWA is similar.  _____________________________________________________________________________  __  MMode/2D Measurements & Calculations  IVSd: 0.97 cm     LVIDd: 5.5 cm  LVIDs: 4.2 cm  LVPWd: 1.0 cm  FS: 23.7 %  LV mass(C)d: 209.9 grams  LV mass(C)dI: 85.6 grams/m2  Ao root diam: 3.3 cm  asc Aorta Diam: 3.7 cm  LVOT diam: 2.3 cm  LVOT area: 4.2 cm2  LA Volume (BP): 116.0 ml  LA Volume Index (BP): 47.3 ml/m2  RWT: 0.37           Doppler Measurements & Calculations  MV E max john: 125.7 cm/sec  MV dec time: 0.14 sec  Ao V2 max: 179.0 cm/sec  Ao max P.0 mmHg  Ao V2 mean: 121.0 cm/sec  Ao mean P.0 mmHg  Ao V2 VTI: 34.6 cm  PRISCILA(I,D): 2.1 cm2  PRISCILA(V,D): 2.1 cm2  LV V1 max PG: 3.1 mmHg  LV V1 max: 88.7 cm/sec  LV V1 VTI: 17.7 cm  SV(LVOT): 73.5 ml  SI(LVOT): 30.0 ml/m2  PA V2 max: 94.7 cm/sec  PA max PG: 3.6 mmHg  PA acc time: 0.07 sec  AV  Yovani Ratio (DI): 0.50  PRISCILA Index (cm2/m2): 0.87  E/E' av.2  Lateral E/e': 17.8  Medial E/e': 22.6        _____________________________________________________________________________  __        Report approved by: Kimberly CACERES 2019 09:38 AM                     ASSESSMENT/PLAN:  Jayro Elder is a 68 year old male with a history of type 2 diabetes complicated by prior diabetic foot infections, subacute left occipital CVA, coronary artery disease with stents, and associated ischemic cardiomyopathy with most recent echocardiogram completed 2018 showing an ejection fraction of 35-40% with indeterminate diastolic dysfunction, stress testing completed on 2018 showing a fixed large mid to distal anterior and anteroseptal defect, atrial fibrillation on chronic apixaban, untreated obstructive sleep apnea, recent admission with second toe osteomyelitis s/p toe amputation, who presented to ED with acute on chronic CHF exacerbtion.    # Acute on chronic CHF exacerbation 2/2 ABNER, recent surgeries and infection  # Hx CAD s/p multiple stents  # Hx ICM with EF 35-40%  Presented with orthopnea, 9 kg weight gain, BLE, and worsening ANDREWS over the past week in the setting of known ischemic cardiomyopathy with a most recent EF 35-40% in 2018. On admission, he was found to have elevated BNP 8,810 with normal troponins and an unchanged EKG. Acute exacerbation is secondary to ABNER from previous hospitalization (Cr peaked at 3.45 on ), thought to be 2/2 antibiotics he had received (i.e. Vancomycin). TTE from  showed dilated IVC and elevated RA pressures consistent with fluid overload; EF still at 35-40% and regional wall abnormalities involving the septum, apex, and inferior walls that was also seen on echo from September.    - Received lasix 80 mg and infusion with net -3.7L, discontinue lasix gtt,  re-evaluate dosing in AM  - Strict I&Os  - Daily weights  - Monitor K/Mg (q12 BMP)  - Continue plavix  75 mg, imdur 30 mg, metoprolol 50 mg qday, simvastatin 40 mg  - Holding lisinopril 2/2 ABNER  - CORE following, pt to see new cardiologist in [place] and follow up with heart failure clinic when ready for discharge    # Afib  [id number] score 5 (age, HTN, DM, stroke). Rates controlled  - Continue PTA metoprolol  - Continue PTA apixaban  - AC with heparin subq    # ABNER on CKD  Cr 2.92 today. Baseline is 0.8-0.9. Had ABNER while previous admission for toe amputation. Peak was 3.45 on 1/13. Slowly improving. Could be multifactorial, secondary to cardiorenal, ABx use (he was at first on vancomycin), Hx of DM, and lisinopril. Renal US was normal.  - Diuresis as above  - Monitor, and avoid nephrotoxins  - Hold lisinopril     # Recent osteomyelitis s/p rt second toe amputation   Had surgery on 1/6 and 1/8. Received vancomycin -> ceftriaxone while inpatient, discharged on Augmentin for 10 days, until 1/23. Need podiatry follow up upon discharge.  - Continue Augmentin until 1/23     # Type 2 DM  - continue PTA Lantus 10 U qday (pt was on 35 U BID previously, but dose decreased while previous admission at OSH)  - placed on high SSI scale, hypoglycemic protocol.     # Hypertension  - continue home amlodipine 10 mg, metoprolol succinate 50 mg qday, imdur 30 mg qday  - hold lisinopril 20 mg  - prn Hydralazine for SBP > 160     # Hyperlipidemia:  Patient previously on Simvastatin 40 mg, will increase to high intensity statin in patient with known CAD  - Lipitor 40 mg daily    # Hypothyrodism:   -continue home levothyroxine 137 mcg     Code status: Full  FEN: Cardiac/Diabetic  DVT Prophylaxis: PTA apixaban  Family: wife, son updated  Dispo: discharge home in 2-3 days pending volume status      Patient seen and discussed with Dr. Anand, who agrees with above plan.    Rachael Valles, MS4    Yvrose Goodman, APRN, CNP  [facility] Cardiology  [phone]

## 2019-01-18 NOTE — ED NOTES
[facility], [place]   ED Nurse to Floor Handoff     Jayro Elder is a 68 year old male who speaks English and lives with a spouse,  in a home  They arrived in the ED by car from home    ED Chief Complaint: Shortness of Breath    ED Dx;   Final diagnoses:   Fluid overload   Acute systolic congestive heart failure (H)         Needed?: No    Allergies:   Allergies   Allergen Reactions     No Known Allergies    .  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     Cardiomyopathy (H)      Cellulitis of left lower extremity 3/13/2018     Cerebral infarction (H)     eye sight decreased in peripheral of right side and blurry     Diabetic ulcer of left midfoot associated with type 2 diabetes mellitus, with fat layer exposed (H) 3/13/2018     Essential hypertension, benign 11/13/2002     Generalized osteoarthrosis, unspecified site 11/13/2002     Microalbuminuria 3/13/2018    X1     Myocardial infarction (H)      Neuropathy      Sepsis (H)      Sleep apnea     doesn't use it     Substance abuse (H)      Syncope, unspecified syncope type 3/13/2018     Thyroid disease     takes medicine     Tobacco use disorder 11/13/2002     Type 2 diabetes mellitus with diabetic peripheral angiopathy without gangrene, unspecified long term insulin use status 3/13/2018     Type II or unspecified type diabetes mellitus without mention of complication, not stated as uncontrolled 7/14/2005      Baseline Mental status: WDL  Current Mental Status changes: at basesline    Infection present or suspected this encounter: no  Sepsis suspected: No  Isolation type: No active isolations     Activity level - Baseline/Home:  Independent  Activity Level - Current:   Stand with Assist    Bariatric equipment needed?: No    In the ED these meds were given:    Medications   furosemide (LASIX) injection 20 mg (20 mg Intravenous Given 1/17/19 1377)       Drips running?  No    Home pump  No    Current LDAs  Peripheral IV 01/17/19 Left Upper forearm (Active)   Site Assessment WDL 1/17/2019  4:53 PM   Line Status Saline locked;Checked every 1-2 hour 1/17/2019  4:53 PM   Number of days: 0       Left Radial Interventional Procedure Access (Active)   Number of days: 583       Wound 09/06/18 Right Toe (Comment  which one) Ulceration Third toe (Active)   Number of days: 133       Wound 09/06/18 Left Foot left medial inner ball of foot- Calloused (Active)   Number of days: 133       Wound 01/05/19 Anterior;Left Toe (Comment  which one) Other (comment) 2nd toe Callous (Active)   Number of days: 12       Wound 01/05/19 Anterior;Right Toe (Comment  which one) 2nd toe callous (Active)   Number of days: 12       Wound 01/05/19 Anterior;Right Toe (Comment  which one) Other (comment) 3rd toe prior partial amputation (Active)   Number of days: 12       Wound Anterior;Right Foot Other (comment) cellulitis (Active)   Number of days:        Incision/Surgical Site 09/11/18 Bilateral Foot (Active)   Number of days: 128       Incision/Surgical Site Anterior;Right Toe (Comment  which one) (Active)   Number of days:        Labs results:   Labs Ordered and Resulted from Time of ED Arrival Up to the Time of Departure from the ED   CBC WITH PLATELETS DIFFERENTIAL - Abnormal; Notable for the following components:       Result Value    RBC Count 4.18 (*)     Hemoglobin 12.1 (*)     Hematocrit 36.6 (*)     Absolute Neutrophil 8.6 (*)     All other components within normal limits   COMPREHENSIVE METABOLIC PANEL - Abnormal; Notable for the following components:    Glucose 317 (*)     Creatinine 2.91 (*)     GFR Estimate 21 (*)     GFR Estimate If Black 24 (*)     Calcium 8.4 (*)     Albumin 3.3 (*)     All other components within normal limits   NT PROBNP INPATIENT - Abnormal; Notable for the following  components:    N-Terminal Pro BNP Inpatient 8,810 (*)     All other components within normal limits   ROUTINE UA WITH MICROSCOPIC REFLEX TO CULTURE - Abnormal; Notable for the following components:    Glucose Urine 300 (*)     Leukocyte Esterase Urine Trace (*)     All other components within normal limits   TROPONIN I   LACTIC ACID WHOLE BLOOD   URINE CULTURE AEROBIC BACTERIAL       Imaging Studies:   Recent Results (from the past 24 hour(s))   XR Chest 2 Views    Narrative    Exam:  Chest X-ray 1/17/2019 4:59 PM    History: orthopnea, heart failure suspected    Comparison: 9/6/2018    Findings: PA and lateral views of the chest. Stable cardiac  mediastinal silhouette. No pleural effusion. Mild pulmonary vascular  congestion. Mildly increased interstitial markings. Patchy  retrocardiac opacities. No pneumothorax. The visualized upper abdomen  is unremarkable. Old healed left-sided rib fractures.      Impression    Impression:   1. Pulmonary vascular congestion with increased interstitial markings,  likely mild pulmonary edema.  2. Retrocardiac opacity, the differential includes atelectasis,  consolidation, or pulmonary edema.    I have personally reviewed the examination and initial interpretation  and I agree with the findings.    MARTHA SUTTON MD       Recent vital signs:   BP (!) 143/97   Pulse 100   Temp 97.7  F (36.5  C) (Oral)   Resp 19   Wt 121.7 kg (268 lb 4.8 oz)   SpO2 93%   BMI 32.66 kg/m      Cardiac Rhythm: Afib  Pt needs tele? No  Skin/wound Issues: None    Code Status: Full Code    Pain control: good    Nausea control: good    Abnormal labs/tests/findings requiring intervention: BNP    Family present during ED course? Yes   Family Comments/Social Situation comments:     Tasks needing completion: None    Robert Schmidt RN    [phone] [place] [facility]  [phone] [facility] ED

## 2019-01-18 NOTE — PROGRESS NOTES
CLINICAL NUTRITION SERVICES    Received an admission nutrition risk screen for reduced oral intake over the last month. Per pt, decreased appetite for the past one and a half to two weeks with not feeling well and early satiety. Pt ordered full meals for breakfast and lunch and consumed 100% of breakfast with a good appetite per chart. Wt Hx: 112.9 kg (10/25/17), 114.3 kg (3/12/18), 115.1 kg (9/6/18), 119.3 kg (1/14/19), 115.5 kg (1/18/19) - Diuresis this hospital admission. Otherwise, no significant or severe wt loss PTA.       Follow Up / Monitoring:   Per protocol    Melissa King, MS, RD, LD, [facility]   6C Pgr:  [phone]

## 2019-01-18 NOTE — PLAN OF CARE
"  Fluid Imbalance (Heart Failure)  Fluid Balance  1/18/2019 0634 - Improving by Judith Gloria RN     /77 (BP Location: Right arm)   Pulse 85   Temp 97.6  F (36.4  C) (Oral)   Resp 18   Ht 1.93 m (6' 4\")   Wt 115.5 kg (254 lb 9.6 oz)   SpO2 95%   BMI 30.99 kg/m       Pt arrived to [facility] from ED around 2230    Neuro: A/Ox4  Cardiac: VSS. Afib with RBBB. No c/o CP  Respiratory: On RA. Denies SOB  GI/: Good UOP. LBM yesterday  Diet/Appetite: 2gm Na diet. Pt reports poor appetite. BG covered with sliding scale insulin  Skin: Recently R amputated toe with stitches. Pt wears a boot in order to ambulate on that foot. No other skin deficits noted.   LDA: PIV infusing lasix gtt at 10 mg/hr.   Activity: Up with SBA.   Pain: Denies  Plan: Pt reports he feels much better after starting the lasix gtt. Down 6 pounds overnight. Continue to diurese and follow POC. Notify MD with any changes or concerns     "

## 2019-01-18 NOTE — PHARMACY-ADMISSION MEDICATION HISTORY
"Admission medication history interview status for the 1/17/2019 admission is complete. See Epic admission navigator for allergy information, pharmacy, prior to admission medications and immunization status.     Medication history interview sources:  Patient, CareEverywhere, Outside Medications    Changes made to PTA medication list (reason)  Added: None  Deleted: None  Changed: Toujeo: 10 units in the AM--->35 units in the AM and 35 units in the PM.      Additional medication history information (including reliability of information, actions taken by pharmacist):    -The patient was reliable and able to answer all questions.  -Allergies: Confirmed  -Pharmacy: [facility]  -Augmentin: This medication was prescribed 1/14/19 for cellulitis. The patient stated he has been taking it since Tuesday 1/15/19.  -Apixaban, Clopidogrel, and Lisinopril: Patient states these were put on hold prior to surgery which was \"2 weeks ago\". He was to be put back on them but states he has not been given the ok yet. He states he was going to talk to his MD today until he decided to come in to the ED.  -Toujeo: [facility] records had directions to take 10 units in the AM, but the patient states he has been using 35 units in the AM and 35 units in the PM. He then stated that this was to be changed to Lantus BECAUSE THE tOUJEO \"cleearly isn't working\", but hasn't started that due to today's visit to the ED. [facility] [employer], there was no record of directions reflecting the 35 units BID.      Prior to Admission medications    Medication Sig Last Dose Taking? Auth Provider   amLODIPine (NORVASC) 5 MG tablet Take 2 tablets (10 mg) by mouth daily 1/17/2019 at Unknown time Yes Meenu Catherine MD   amoxicillin-clavulanate (AUGMENTIN) 875-125 MG tablet Take 1 tablet by mouth daily for 10 days 1/17/2019 at Unknown time Yes Meenu Catherine MD   apixaban ANTICOAGULANT (ELIQUIS) 5 MG tablet Take 1 tablet (5 mg) " by mouth 2 times daily HOLD this medication until instructed to resume by your primary MD Past Month at ON HOLD Yes Meenu Catherine MD   Cholecalciferol (VITAMIN D3) 2000 units TABS Take 1 tablet by mouth daily 1/16/2019 at Unknown time Yes Unknown, Entered By History   clopidogrel (PLAVIX) 75 MG tablet Take 1 tablet (75 mg) by mouth daily Past Month at ON HOLD Yes Johnathan Mckeon MD   insulin aspart (NOVOLOG FLEXPEN) 100 UNIT/ML pen Inject Subcutaneous 3 times daily (with meals) USes about 7-10 units with meal depending 1/16/2019 at Unknown time Yes Unknown, Entered By History   insulin glargine U-300 (TOUJEO) 300 UNIT/ML injection Inject 35 units in the morning and 35 units in the evening. 1/17/2019 at AM Yes Unknown, Entered By History   insulin pen needle 31G X 6 MM Use  as directed. 1/17/2019 at Unknown time Yes Vinicio Cook MD   isosorbide mononitrate (IMDUR) 30 MG 24 hr tablet Take 1 tablet (30 mg) by mouth daily 1/17/2019 at Unknown time Yes Johnathan Mckeon MD   levothyroxine (SYNTHROID, LEVOTHROID) 137 MCG tablet Take 137 mcg by mouth At Bedtime  1/16/2019 at Unknown time Yes Henrry Figueroa PA-C   lisinopril (PRINIVIL/ZESTRIL) 20 MG tablet Take 1 tablet (20 mg) by mouth 2 times daily HOLD this medication until instructed to resume per primary MD Past Month at ON HOLD Yes Meenu Catherine MD   metoprolol succinate (TOPROL-XL) 100 MG 24 hr tablet Take 0.5 tablets (50 mg) by mouth At Bedtime 1/16/2019 at Unknown time Yes Anthony Baker DO   pantoprazole (PROTONIX) 40 MG enteric coated tablet Take 1 tablet (40 mg) by mouth every morning (before breakfast) 1/17/2019 at Unknown time Yes Ana Frankel MD   simvastatin (ZOCOR) 40 MG tablet Take 1 tablet (40 mg) by mouth At Bedtime 1/16/2019 at Unknown time Yes Johnathan Mckeon MD   nitroglycerin (NITROSTAT) 0.4 MG sublingual tablet Place 1 tablet (0.4 mg) under the tongue every 5 minutes as needed for chest pain Unknown at Unknown time  Art  Davion Livingston PA-C   order for DME Equipment being ordered: Other: surgical shoe male XL  Treatment Diagnosis: sp right 2nd toe amputaion   Ryan Bhagat DPM   order for DME Equipment being ordered: Walker Wheels () and Walker ()  Treatment Diagnosis: Impaired gait, heel WB bilaterally.   Herb Zurita III, MD         Medication history completed by: JAY JYA Mike3 Student Pharmacist

## 2019-01-18 NOTE — PROGRESS NOTES
Care Coordinator Progress Note    Admission Date/Time:  1/17/2019  Attending MD:  Princess Anand MD    Data  Chart reviewed, discussed with interdisciplinary team.   Patient was admitted for:    Fluid overload  Acute systolic congestive heart failure (H)  Atherosclerosis of native coronary artery of native heart without angina pectoris  Stented coronary artery  Personal history of tobacco use, presenting hazards to health.    Concerns with insurance coverage for discharge needs: None stated by pt.  Current Living Situation: Patient lives with spouse.  Support System: Supportive and Involved wife.   Services Involved: none currently.   Transportation at Discharge: Pt's wife will provide pt with a ride home upon discharge.   Transportation to Medical Appointments: wife will provide.    - Name of caregiver: wife.   Barriers to Discharge: medical condition.     Coordination of Care and Referrals:  No home care needs anticipated.    Assessment  Pt currently receiving Lasix IV, telemetry cardiac monitoring.    Plan  Anticipated Discharge Date:  TBD  Anticipated Discharge Plan:  Discharge to home with outpt f/u.     DEBRA PARMAR RN BSN  Care Coordinator Unit   [phone]

## 2019-01-18 NOTE — PROGRESS NOTES
SPIRITUAL HEALTH SERVICES  SPIRITUAL ASSESSMENT Progress Note  [facility] 6C     REFERRAL SOURCE: Initial unit  visit with pt, per request for hospital  visit as noted in initial nursing assessment.    Pt declined  support at this time, will request if desired.  PLAN: no follow-up indicated at this time.    Yair Oneill M.Div. (Bill), [facility]  Staff   Pager [phone]

## 2019-01-18 NOTE — PROGRESS NOTES
Clinic Care Coordination Contact    Situation: Patient chart reviewed by care coordinator.    Background: RNCC following patient for management of his CHF. Attempted outreach on 01/15, patient did not want to speak with writer at this time. Plan was to follow up with patient at 01/17 Primary Care Provider appointment. Patient did not show up for this appointment.     Assessment: Patient presently admitted to [facility] since 01/17.     Plan/Recommendations: RNCC will follow patient closely and monitor for discharge. RNCC to complete outreach with patient within 1-2 business days of discharge.     Leann Wolf RN Care Coordinator  [facility]  Email: [email or web address]  Phone: [phone]

## 2019-01-19 ENCOUNTER — APPOINTMENT (OUTPATIENT)
Dept: OCCUPATIONAL THERAPY | Facility: CLINIC | Age: 69
DRG: 292 | End: 2019-01-19
Payer: COMMERCIAL

## 2019-01-19 LAB
ANION GAP SERPL CALCULATED.3IONS-SCNC: 13 MMOL/L (ref 3–14)
ANION GAP SERPL CALCULATED.3IONS-SCNC: 9 MMOL/L (ref 3–14)
BUN SERPL-MCNC: 32 MG/DL (ref 7–30)
BUN SERPL-MCNC: 32 MG/DL (ref 7–30)
CALCIUM SERPL-MCNC: 8.4 MG/DL (ref 8.5–10.1)
CALCIUM SERPL-MCNC: 8.4 MG/DL (ref 8.5–10.1)
CHLORIDE SERPL-SCNC: 101 MMOL/L (ref 94–109)
CHLORIDE SERPL-SCNC: 101 MMOL/L (ref 94–109)
CO2 SERPL-SCNC: 25 MMOL/L (ref 20–32)
CO2 SERPL-SCNC: 28 MMOL/L (ref 20–32)
CREAT SERPL-MCNC: 3.11 MG/DL (ref 0.66–1.25)
CREAT SERPL-MCNC: 3.12 MG/DL (ref 0.66–1.25)
ERYTHROCYTE [DISTWIDTH] IN BLOOD BY AUTOMATED COUNT: 13 % (ref 10–15)
GFR SERPL CREATININE-BSD FRML MDRD: 19 ML/MIN/{1.73_M2}
GFR SERPL CREATININE-BSD FRML MDRD: 19 ML/MIN/{1.73_M2}
GLUCOSE BLDC GLUCOMTR-MCNC: 175 MG/DL (ref 70–99)
GLUCOSE BLDC GLUCOMTR-MCNC: 197 MG/DL (ref 70–99)
GLUCOSE BLDC GLUCOMTR-MCNC: 246 MG/DL (ref 70–99)
GLUCOSE SERPL-MCNC: 174 MG/DL (ref 70–99)
GLUCOSE SERPL-MCNC: 204 MG/DL (ref 70–99)
HCT VFR BLD AUTO: 35.4 % (ref 40–53)
HGB BLD-MCNC: 11.8 G/DL (ref 13.3–17.7)
INR PPP: 1.4 (ref 0.86–1.14)
MAGNESIUM SERPL-MCNC: 1.9 MG/DL (ref 1.6–2.3)
MAGNESIUM SERPL-MCNC: 2.1 MG/DL (ref 1.6–2.3)
MCH RBC QN AUTO: 29.1 PG (ref 26.5–33)
MCHC RBC AUTO-ENTMCNC: 33.3 G/DL (ref 31.5–36.5)
MCV RBC AUTO: 87 FL (ref 78–100)
PLATELET # BLD AUTO: 303 10E9/L (ref 150–450)
POTASSIUM SERPL-SCNC: 3.4 MMOL/L (ref 3.4–5.3)
POTASSIUM SERPL-SCNC: 3.5 MMOL/L (ref 3.4–5.3)
RBC # BLD AUTO: 4.05 10E12/L (ref 4.4–5.9)
SODIUM SERPL-SCNC: 138 MMOL/L (ref 133–144)
SODIUM SERPL-SCNC: 139 MMOL/L (ref 133–144)
WBC # BLD AUTO: 7.7 10E9/L (ref 4–11)

## 2019-01-19 PROCEDURE — 25000128 H RX IP 250 OP 636: Performed by: STUDENT IN AN ORGANIZED HEALTH CARE EDUCATION/TRAINING PROGRAM

## 2019-01-19 PROCEDURE — 80048 BASIC METABOLIC PNL TOTAL CA: CPT | Performed by: HOSPITALIST

## 2019-01-19 PROCEDURE — 21400000 ZZH R&B CCU UMMC

## 2019-01-19 PROCEDURE — 25000132 ZZH RX MED GY IP 250 OP 250 PS 637: Performed by: HOSPITALIST

## 2019-01-19 PROCEDURE — 36415 COLL VENOUS BLD VENIPUNCTURE: CPT | Performed by: STUDENT IN AN ORGANIZED HEALTH CARE EDUCATION/TRAINING PROGRAM

## 2019-01-19 PROCEDURE — 97535 SELF CARE MNGMENT TRAINING: CPT | Mod: GO | Performed by: OCCUPATIONAL THERAPIST

## 2019-01-19 PROCEDURE — 25000132 ZZH RX MED GY IP 250 OP 250 PS 637: Performed by: STUDENT IN AN ORGANIZED HEALTH CARE EDUCATION/TRAINING PROGRAM

## 2019-01-19 PROCEDURE — 80048 BASIC METABOLIC PNL TOTAL CA: CPT | Performed by: STUDENT IN AN ORGANIZED HEALTH CARE EDUCATION/TRAINING PROGRAM

## 2019-01-19 PROCEDURE — 85610 PROTHROMBIN TIME: CPT | Performed by: STUDENT IN AN ORGANIZED HEALTH CARE EDUCATION/TRAINING PROGRAM

## 2019-01-19 PROCEDURE — 97110 THERAPEUTIC EXERCISES: CPT | Mod: GO | Performed by: OCCUPATIONAL THERAPIST

## 2019-01-19 PROCEDURE — 25000132 ZZH RX MED GY IP 250 OP 250 PS 637: Performed by: NURSE PRACTITIONER

## 2019-01-19 PROCEDURE — 40000133 ZZH STATISTIC OT WARD VISIT: Performed by: OCCUPATIONAL THERAPIST

## 2019-01-19 PROCEDURE — 85027 COMPLETE CBC AUTOMATED: CPT | Performed by: STUDENT IN AN ORGANIZED HEALTH CARE EDUCATION/TRAINING PROGRAM

## 2019-01-19 PROCEDURE — 99233 SBSQ HOSP IP/OBS HIGH 50: CPT | Mod: GC | Performed by: INTERNAL MEDICINE

## 2019-01-19 PROCEDURE — 00000146 ZZHCL STATISTIC GLUCOSE BY METER IP

## 2019-01-19 PROCEDURE — 83735 ASSAY OF MAGNESIUM: CPT | Performed by: HOSPITALIST

## 2019-01-19 PROCEDURE — 83735 ASSAY OF MAGNESIUM: CPT | Performed by: STUDENT IN AN ORGANIZED HEALTH CARE EDUCATION/TRAINING PROGRAM

## 2019-01-19 PROCEDURE — 36415 COLL VENOUS BLD VENIPUNCTURE: CPT | Performed by: HOSPITALIST

## 2019-01-19 RX ORDER — CARVEDILOL 12.5 MG/1
12.5 TABLET ORAL 2 TIMES DAILY WITH MEALS
Status: DISCONTINUED | OUTPATIENT
Start: 2019-01-19 | End: 2019-01-20

## 2019-01-19 RX ORDER — FUROSEMIDE 10 MG/ML
20 INJECTION INTRAMUSCULAR; INTRAVENOUS 2 TIMES DAILY
Status: DISCONTINUED | OUTPATIENT
Start: 2019-01-19 | End: 2019-01-19

## 2019-01-19 RX ORDER — FUROSEMIDE 10 MG/ML
40 INJECTION INTRAMUSCULAR; INTRAVENOUS 2 TIMES DAILY
Status: DISCONTINUED | OUTPATIENT
Start: 2019-01-20 | End: 2019-01-23

## 2019-01-19 RX ORDER — POTASSIUM CHLORIDE 1500 MG/1
60 TABLET, EXTENDED RELEASE ORAL ONCE
Status: COMPLETED | OUTPATIENT
Start: 2019-01-19 | End: 2019-01-19

## 2019-01-19 RX ORDER — MAGNESIUM SULFATE 1 G/100ML
1 INJECTION INTRAVENOUS ONCE
Status: COMPLETED | OUTPATIENT
Start: 2019-01-19 | End: 2019-01-19

## 2019-01-19 RX ORDER — POTASSIUM CHLORIDE 750 MG/1
40 TABLET, EXTENDED RELEASE ORAL ONCE
Status: COMPLETED | OUTPATIENT
Start: 2019-01-19 | End: 2019-01-19

## 2019-01-19 RX ORDER — HEPARIN SODIUM 5000 [USP'U]/.5ML
5000 INJECTION, SOLUTION INTRAVENOUS; SUBCUTANEOUS EVERY 8 HOURS
Status: DISCONTINUED | OUTPATIENT
Start: 2019-01-19 | End: 2019-01-19

## 2019-01-19 RX ORDER — FUROSEMIDE 10 MG/ML
20 INJECTION INTRAMUSCULAR; INTRAVENOUS ONCE
Status: COMPLETED | OUTPATIENT
Start: 2019-01-19 | End: 2019-01-19

## 2019-01-19 RX ADMIN — ISOSORBIDE MONONITRATE 30 MG: 30 TABLET, EXTENDED RELEASE ORAL at 08:12

## 2019-01-19 RX ADMIN — FUROSEMIDE 20 MG: 10 INJECTION, SOLUTION INTRAMUSCULAR; INTRAVENOUS at 16:51

## 2019-01-19 RX ADMIN — FUROSEMIDE 20 MG: 10 INJECTION, SOLUTION INTRAVENOUS at 14:48

## 2019-01-19 RX ADMIN — AMOXICILLIN AND CLAVULANATE POTASSIUM 1 TABLET: 875; 125 TABLET, FILM COATED ORAL at 22:20

## 2019-01-19 RX ADMIN — PANTOPRAZOLE SODIUM 40 MG: 40 TABLET, DELAYED RELEASE ORAL at 08:12

## 2019-01-19 RX ADMIN — CARVEDILOL 12.5 MG: 12.5 TABLET, FILM COATED ORAL at 18:15

## 2019-01-19 RX ADMIN — POTASSIUM CHLORIDE 60 MEQ: 1500 TABLET, EXTENDED RELEASE ORAL at 08:12

## 2019-01-19 RX ADMIN — LEVOTHYROXINE SODIUM 137 MCG: 25 TABLET ORAL at 22:20

## 2019-01-19 RX ADMIN — CARVEDILOL 12.5 MG: 12.5 TABLET, FILM COATED ORAL at 11:28

## 2019-01-19 RX ADMIN — POTASSIUM CHLORIDE 40 MEQ: 750 TABLET, EXTENDED RELEASE ORAL at 18:15

## 2019-01-19 RX ADMIN — MAGNESIUM SULFATE IN DEXTROSE 1 G: 10 INJECTION, SOLUTION INTRAVENOUS at 08:09

## 2019-01-19 RX ADMIN — CLOPIDOGREL BISULFATE 75 MG: 75 TABLET ORAL at 08:12

## 2019-01-19 RX ADMIN — AMOXICILLIN AND CLAVULANATE POTASSIUM 1 TABLET: 875; 125 TABLET, FILM COATED ORAL at 09:52

## 2019-01-19 RX ADMIN — AMLODIPINE BESYLATE 10 MG: 10 TABLET ORAL at 08:12

## 2019-01-19 RX ADMIN — FUROSEMIDE 20 MG: 10 INJECTION, SOLUTION INTRAVENOUS at 09:52

## 2019-01-19 RX ADMIN — APIXABAN 5 MG: 5 TABLET, FILM COATED ORAL at 08:12

## 2019-01-19 RX ADMIN — ATORVASTATIN CALCIUM 40 MG: 40 TABLET, FILM COATED ORAL at 20:54

## 2019-01-19 RX ADMIN — APIXABAN 2.5 MG: 2.5 TABLET, FILM COATED ORAL at 20:54

## 2019-01-19 ASSESSMENT — ACTIVITIES OF DAILY LIVING (ADL)
ADLS_ACUITY_SCORE: 16

## 2019-01-19 ASSESSMENT — MIFFLIN-ST. JEOR: SCORE: 2022.73

## 2019-01-19 NOTE — PLAN OF CARE
Discharge Planner OT   6C   Patient plan for discharge: home  Current status: Pt IND ambulating in the hallway, set goal of 4x + a day.  Pt unable to determine improvement in edema, measurements taken and training provided for conservative measures as pt declined wraps due to recent amputation.    Barriers to return to prior living situation: edema, fluid overload  Recommendations for discharge: home w APRN  Rationale for recommendations: limited LBD due to edema       Entered by: Nichole Lassiter 01/19/2019 2:20 PM

## 2019-01-19 NOTE — PLAN OF CARE
Discharge Planner OT   6C [place]  Patient plan for discharge: home,has worked with OP CR, declines this  Current status: Pt reports being near baseline, deficits due to fluid overload and recent amputations on feet.  Educated re conservative edema management as he declines wrapping due to recent foot surgery.  Ambulated functional distance in the nevarez VSS.  Barriers to return to prior living situation: edema, medical needs  Recommendations for discharge: home w A PRN  Rationale for recommendations: has worked with CR in the past, declines this, may need assist due to precautions and poor sensation in feet       Entered by: Nichole Lassiter 01/18/2019 9:57 PM

## 2019-01-19 NOTE — PLAN OF CARE
D: Fluid overload     I: Monitored vitals and assessed pt status.   Changed: Lasix gtt discontinued   Running: PIV SL  PRN: Nothing given     A: A0x4. VSS. Afebrile. Urinating adequately. Up independently. Slipper and surgical boot when out of bed. Recent 2nd toe amputation on Right foot. Incision sutured closed, CDI.  3+ Edema BLE. No tenderness reported. No SOB on RA. Slight ANDREWS. Tele: Afib, rate controlled. Chronic.      P: Continue to monitor Pt status and report changes to Cards 1.     Temp:  [97.5  F (36.4  C)-98.5  F (36.9  C)] 98  F (36.7  C)  Pulse:  [] 85  Heart Rate:  [72-93] 89  Resp:  [16-20] 18  BP: (128-168)/() 146/92  SpO2:  [93 %-97 %] 93 %

## 2019-01-19 NOTE — PROGRESS NOTES
01/18/19 2200   Quick Adds   Type of Visit Initial Occupational Therapy Evaluation   Living Environment   Lives With spouse   Living Arrangements house   Home Accessibility stairs to enter home   Living Environment Comment has stair lift to get up to bed room level, stairs to basement where he occasionally goes down to check the water softner   Self-Care   Usual Activity Tolerance good   Current Activity Tolerance moderate   Equipment Currently Used at Home cane, straight;shower chair;walker, rolling;wheelchair, manual;glucometer  (chair lift)   Activity/Exercise/Self-Care Comment Per Prior note and confirmed by pt: Pt reports that he used the SEC, 4WW and knee scooter after his last surgery in Sept.  Pt had progressed to not needing any AD for gait prior to admit.  Pt doesn't drive very often.  Wife drives.  Patient tries to help with household chores (cooking, cleaning, laundry) due to LE pain and weakness.  Pt was I with ADLs.  Pt retired - worked at [employer].   Functional Level   Ambulation 1-->assistive equipment   Transferring 1-->assistive equipment   Toileting 0-->independent   Bathing 1-->assistive equipment   Dressing 0-->independent   Eating 0-->independent   Cognition 0 - no cognition issues reported   Fall history within last six months no   Which of the above functional risks had a recent onset or change? ambulation   Prior Functional Level Comment Mod I prior.   General Information   Onset of Illness/Injury or Date of Surgery - Date 01/17/19   Referring Physician Dr Chu   Patient/Family Goals Statement get fluid down   Additional Occupational Profile Info/Pertinent History of Current Problem 68 year old male with a history of type 2 diabetes complicated by prior diabetic foot infections, subacute left occipital CVA, coronary artery disease with stents, and associated ischemic cardiomyopathy with most recent echocardiogram completed 9/2018 showing an ejection fraction of 35-40% with  indeterminate diastolic dysfunction, stress testing completed on 9/14/2018 showing a fixed large mid to distal anterior and anteroseptal defect, atrial fibrillation on chronic apixaban, untreated obstructive sleep apnea, recent admission with second toe osteomyelitis s/p toe amputation on 1/6 and 1/8, presented to ED with orthopnea, abdominal distension, and bilateral leg swelling.   Precautions/Limitations fall precautions   General Observations Pt sitting EOB agreeable to CR.  Has special boot post amputation.    General Info Comments activity; up ad nirali   Cognitive Status Examination   Orientation orientation to person, place and time   Level of Consciousness alert   Cognitive Comment intact   Visual Perception   Visual Perception Comments poor vision to the R   Sensory Examination   Sensory Comments reports poor sensation in feet   Pain Assessment   Patient Currently in Pain Yes, see Vital Sign flowsheet   Integumentary/Edema   Integumentary/Edema other (describe)   Integumentary/Edema Comments Pt with fluid overload, LE edema, declines wraps due to recent toe amputation   Posture   Posture not impaired   Range of Motion (ROM)   ROM Quick Adds No deficits were identified   Strength   Manual Muscle Testing Quick Adds No deficits were identified   Coordination   Upper Extremity Coordination No deficits were identified   Mobility   Bed Mobility Comments NT, sitting EOB upon arrival, reports no issues or need for assist   Transfer Skills   Transfer Comments IND   Transfer Skill: Sit to Stand   Level of Barre: Sit/Stand independent   Balance   Balance Comments IND, deficits due to boot and lack of sensation in feet   Lower Body Dressing   Level of Barre: Dress Lower Body minimum assist (75% patients effort)   Grooming   Level of Barre: Grooming independent   Eating/Self Feeding   Level of Barre: Eating independent   Instrumental Activities of Daily Living (IADL)   Previous Responsibilities  "driving;finances;medication management;shopping;laundry;housekeeping;meal prep  (wife assists with all)   Activities of Daily Living Analysis   Impairments Contributing to Impaired Activities of Daily Living balance impaired;post surgical precautions  (edema, activity tolerance)   General Therapy Interventions   Planned Therapy Interventions risk factor education;progressive activity/exercise;home program guidelines;ADL retraining   Clinical Impression   Criteria for Skilled Therapeutic Interventions Met yes, treatment indicated   OT Diagnosis CHF, fluid overload, recent amputation   Influenced by the following impairments post op precautions, pain, LE edema, fatigue   Assessment of Occupational Performance 1-3 Performance Deficits   Identified Performance Deficits home management, LBD   Clinical Decision Making (Complexity) Low complexity   Therapy Frequency 5 times/wk   Predicted Duration of Therapy Intervention (days/wks) 1 week   Anticipated Discharge Disposition Home with Assist   Risks and Benefits of Treatment have been explained. Yes   Patient, Family & other staff in agreement with plan of care Yes   [facility] TM \"6 Clicks\"   2016, Trustees of [facility], under license to [employer].  All rights reserved.   6 Clicks Short Forms Daily Activity Inpatient Short Form   [facility]  \"6 Clicks\" Daily Activity Inpatient Short Form   1. Putting on and taking off regular lower body clothing? 3 - A Little   2. Bathing (including washing, rinsing, drying)? 4 - None   3. Toileting, which includes using toilet, bedpan or urinal? 4 - None   4. Putting on and taking off regular upper body clothing? 4 - None   5. Taking care of personal grooming such as brushing teeth? 4 - None   6. Eating meals? 4 - None   Daily Activity Raw Score (Score out of 24.Lower scores equate to lower levels of function) 23   Total Evaluation Time   Total Evaluation Time (Minutes) 5     "

## 2019-01-19 NOTE — PROGRESS NOTES
[facility]   Cardiology   Progress Note     Interval History:  - Patient reports SOB improving  - Continues to endorse BLE edema  - Denies any chest pain, chest tightness, lightheadedness or dizziness  - Net negative 2.6 L yesterday     Physical Exam:  Temp:  [97.5  F (36.4  C)-98.6  F (37  C)] 98.6  F (37  C)  Pulse:  [77] 77  Heart Rate:  [72-89] 86  Resp:  [18] 18  BP: (128-158)/() 153/99  SpO2:  [93 %-96 %] 96 %    I/O:  Intake/Output Summary (Last 24 hours) at 1/18/2019 1645  Last data filed at 1/18/2019 1600  Gross per 24 hour   Intake 600 ml   Output 4300 ml   Net -3700 ml     Wt:   Wt Readings from Last 5 Encounters:   01/18/19 115.5 kg (254 lb 9.6 oz)   01/14/19 119.3 kg (263 lb)   09/06/18 115.1 kg (253 lb 11.2 oz)   03/12/18 114.3 kg (252 lb)   03/03/18 109.6 kg (241 lb 9.6 oz)       General: NAD  HEENT:  PERRLA, EOMI. Sclera is non-icteric and conjunctiva is pink with no erythema. Oral mucosa moist, no oral lesions, good dentition.  Neck: JVD visible to L ear. No cervical/supraclavicular LAD.   CV: Irregular rhythm. Regular rate. No murmur appreciated. No rubs or gallops. Peripheral radial pulse intact.  Resp: No increased work of breathing or use of accessory muscles, breathing comfortably on room air.  Lung sounds clear throughout/bilaterally  Abdomen: No obvious scars or hernias. Normal active bowel sounds.  Abdomen is soft. No distension, non-tender to palpation. No rebound tenderness or guarding. percussion WNL  :  No suprapubic tenderness.  MSK:  Upper and lower extremity muscle strength intact 5/5 bilaterally.     Extremities: Warm. Capillary refill less than 3 sec. 4+/4 radial pulses bilaterally.  4+/4 pedal pulses bilaterally.  No cyanosis or clubbing. 2+ pitting edema in lower extremities up to the mid shin  Lymphatic: No cervical, supraclavicular LAD.  Skin:  Warm and dry. No erythema, rashes, ulceration or diaphoresis.  Neuro: CN II-XII grossly intact.  Alert and oriented x3.  Moving all extremities equally.    Medications:    amLODIPine  10 mg Oral Daily     amoxicillin-clavulanate  1 tablet Oral Q12H     apixaban ANTICOAGULANT  5 mg Oral BID     atorvastatin  40 mg Oral QPM     clopidogrel  75 mg Oral Daily     heparin  5,000 Units Subcutaneous Q8H     insulin aspart  1-10 Units Subcutaneous TID AC     insulin aspart  1-7 Units Subcutaneous At Bedtime     insulin glargine  12 Units Subcutaneous QAM AC     isosorbide mononitrate  30 mg Oral Daily     levothyroxine  137 mcg Oral At Bedtime     metoprolol succinate ER  50 mg Oral At Bedtime     pantoprazole  40 mg Oral QAM AC       - MEDICATION INSTRUCTIONS -         Labs:   CMP  Recent Labs   Lab 01/19/19  0544 01/18/19  1748 01/18/19  0611 01/17/19  1652  01/13/19  0702    137 139 136   < > 142   POTASSIUM 3.4 3.1* 3.2* 3.7   < > 3.6   CHLORIDE 101 100 103 103   < > 109   CO2 25 25 25 21   < > 23   ANIONGAP 13 12 11 12   < > 10   * 237* 227* 317*   < > 118*   BUN 32* 28 26 25   < > 18   CR 3.11* 3.14* 2.92* 2.91*   < > 3.45*   GFRESTIMATED 19* 19* 21* 21*   < > 17*   GFRESTBLACK 23* 22* 24* 24*   < > 20*   BETTIE 8.4* 8.3* 8.5 8.4*   < > 8.0*   MAG 1.9 1.8 1.8 2.0  --   --    PROTTOTAL  --   --   --  7.5  --  6.0*   ALBUMIN  --   --   --  3.3*  --  2.7*   BILITOTAL  --   --   --  1.2  --  0.5   ALKPHOS  --   --   --  77  --  66   AST  --   --   --  10  --  10   ALT  --   --   --  17  --  15    < > = values in this interval not displayed.     CBC  Recent Labs   Lab 01/17/19  2222 01/17/19  1652 01/14/19  0610 01/13/19  0702   WBC 10.0 10.1 8.9 6.8   RBC 4.11* 4.18* 4.23* 4.08*   HGB 11.8* 12.1* 12.1* 11.6*   HCT 36.3* 36.6* 37.7* 36.5*   MCV 88 88 89 90   MCH 28.7 28.9 28.6 28.4   MCHC 32.5 33.1 32.1 31.8   RDW 13.1 13.2 13.0 12.9    301 279 241     INR  Recent Labs   Lab 01/19/19  0544 01/18/19  0611   INR 1.40* 1.28*     Echo 1/18/19:  Recent Results (from the past 4320 hour(s))   Echocardiogram  Complete    Narrative    [id number]  [id number]  [id number]  [id number]^WILL^LUNA           [facility],[place]  Echocardiography Laboratory  [address] [place] [id number]     Name: PORTER YANCEY  MRN: [id number]  : 1950  Study Date: 2019 07:41 AM  Age: 68 yrs  Gender: Male  Patient Location: [facility]  Reason For Study: CHF  Ordering Physician: LUNA SOLIS  Referring Physician: LUNA SOLIS  Performed By: Ree Valdez     BSA: 2.5 m2  Height: 76 in  Weight: 254 lb  HR: 78  BP: 136/77 mmHg  _____________________________________________________________________________  __        Procedure  Complete Portable Echo Adult. Contrast Optison. Patient was given 6 ml mixture  of 3 ml Optison and 6 ml saline. 3 ml wasted.  _____________________________________________________________________________  __        Interpretation Summary  Moderately (EF 35-40%) reduced left ventricular function is present. Regional  wall abnormality involving the septum, apex and inferior walls. No LV apical  thrombus.  Global right ventricular function is normal. The right ventricle is normal  size.  The inferior vena cava was dilated at 2.6 cm without respiratory variability,  consistent with increased right atrial pressure.  Estimated mean right atrial pressure is 15 mmHg (significantly elevated).  No significant valvular dysfunction.  No pericardial effusion is present.  Rhythm was atrial fibrillation during the study.     This study was compared with the study from 2018 .  IVC doesnot collapse in this study. RA pressure is more elevated. RWA is  similar.     _____________________________________________________________________________  __        Left Ventricle  Left ventricular size is normal. Left ventricular wall thickness is normal.  Diastolic function not assessed due to atrial fibrillation. Moderately (EF 35-  40%) reduced left ventricular function is present. Mid anteroseptum  akinetic.  Inferior wall hypokinesis is present. Apical wall hypokinesis is present.     Right Ventricle  Global right ventricular function is normal. The right ventricle is normal  size.     Atria  Moderate left atrial enlargement is present. Moderate right atrial enlargement  is present. The atrial septum is intact as assessed by color Doppler .     Mitral Valve  The mitral valve is normal. Trace to mild mitral insufficiency is present.        Aortic Valve  Mild aortic valve sclerosis is present. The aortic valve is tricuspid. Trace  aortic insufficiency is present.     Tricuspid Valve  Trace to mild tricuspid insufficiency is present. Pulmonary artery systolic  pressure cannot be assessed.     Pulmonic Valve  The pulmonic valve is normal.     Vessels  The aorta root is normal. Estimated mean right atrial pressure is 15 mmHg  (significantly elevated). The inferior vena cava was dilated at 2.6 cm without  respiratory variability, consistent with increased right atrial pressure.     Pericardium  No pericardial effusion is present.        Compared to Previous Study  This study was compared with the study from 2018 . IVC doesnot collapse in  this study. RA pressure is more elevated. Otherwise direct comparison of the  images doesnot suggest significant change. RWA is similar.  _____________________________________________________________________________  __  MMode/2D Measurements & Calculations  IVSd: 0.97 cm     LVIDd: 5.5 cm  LVIDs: 4.2 cm  LVPWd: 1.0 cm  FS: 23.7 %  LV mass(C)d: 209.9 grams  LV mass(C)dI: 85.6 grams/m2  Ao root diam: 3.3 cm  asc Aorta Diam: 3.7 cm  LVOT diam: 2.3 cm  LVOT area: 4.2 cm2  LA Volume (BP): 116.0 ml  LA Volume Index (BP): 47.3 ml/m2  RWT: 0.37           Doppler Measurements & Calculations  MV E max john: 125.7 cm/sec  MV dec time: 0.14 sec  Ao V2 max: 179.0 cm/sec  Ao max P.0 mmHg  Ao V2 mean: 121.0 cm/sec  Ao mean P.0 mmHg  Ao V2 VTI: 34.6 cm  PRISCILA(I,D): 2.1 cm2  PRISCILA(V,D):  2.1 cm2  LV V1 max PG: 3.1 mmHg  LV V1 max: 88.7 cm/sec  LV V1 VTI: 17.7 cm  SV(LVOT): 73.5 ml  SI(LVOT): 30.0 ml/m2  PA V2 max: 94.7 cm/sec  PA max PG: 3.6 mmHg  PA acc time: 0.07 sec  AV Yovani Ratio (DI): 0.50  PRISCILA Index (cm2/m2): 0.87  E/E' av.2  Lateral E/e': 17.8  Medial E/e': 22.6        _____________________________________________________________________________  __        Report approved by: Kimberly CACERES 2019 09:38 AM                     ASSESSMENT/PLAN:  Jayro Elder is a 68 year old male with a history of type 2 diabetes complicated by prior diabetic foot infections, subacute left occipital CVA, coronary artery disease with stents, and associated ischemic cardiomyopathy with most recent echocardiogram completed 2018 showing an ejection fraction of 35-40% with indeterminate diastolic dysfunction, stress testing completed on 2018 showing a fixed large mid to distal anterior and anteroseptal defect, atrial fibrillation on chronic apixaban, untreated obstructive sleep apnea, recent admission with second toe osteomyelitis s/p toe amputation, who presented to ED with acute on chronic CHF exacerbtion.    Changes today:   - Switch metoprolol succ 50mg every day to Coreg 12.5mg BID for HTN in the setting of HFpEF   - Lasix 40mg IV BID for goal net negative 2-3L     # Acute on chronic CHF exacerbation 2/2 ABNER, recent surgeries and infection  # Hx CAD s/p multiple stents  # Hx ICM with EF 35-40%  Presented with orthopnea, 9 kg weight gain, BLE, and worsening ANDREWS over the past week in the setting of known ischemic cardiomyopathy with a most recent EF 35-40% in 2018. On admission, he was found to have elevated BNP 8,810 with normal troponins and an unchanged EKG. Acute exacerbation is secondary to ABNER from previous hospitalization (Cr peaked at 3.45 on ), thought to be 2/2 antibiotics he had received (i.e. Vancomycin). TTE from  showed dilated IVC and elevated RA pressures  consistent with fluid overload; EF still at 35-40% and regional wall abnormalities involving the septum, apex, and inferior walls that was also seen on echo from September.  - Strict I&Os  - Daily weights (goal wt <250lbs)   - Monitor K/Mg (q12 BMP)  - Lasix 40mg IV BID for goal net negative 2-3L    - Continue plavix 75 mg, imdur 30 mg, simvastatin 40 mg  - Switch metoprolol succ 50mg every day to Coreg 12.5mg BID for HTN in the setting of HFpEF   - Holding lisinopril 2/2 ABNRE  - CORE following, pt to see new cardiologist in [place] and follow up with heart failure clinic when ready for discharge    # Afib  [id number] score 5 (age, HTN, DM, stroke). Discovered in 2017 after patient suffered an left occipital stroke thought to be embolic;  Rates controlled; it seems his cardiologist had considered rhythm control in the past but this was never pursued  - Coreg as above   - Continue apixaban 2.5mg given ABNER (plan to increase to 5mg BID once this resolves)     # ABNER on CKD  Cr 2.92 today. Baseline is 0.8-0.9. Had ABNER while previous admission for toe amputation. Peak was 3.45 on 1/13. Slowly improving. Could be multifactorial, secondary to cardiorenal, ABx use (he was at first on vancomycin), Hx of DM, and lisinopril. Renal US was normal.  - Diuresis as above  - Monitor, and avoid nephrotoxins  - Hold lisinopril  - Will make nephrology referral on discharge      # Recent osteomyelitis s/p rt second toe amputation   Had surgery on 1/6 and 1/8. Received vancomycin -> ceftriaxone while inpatient, discharged on Augmentin for 10 days, until 1/23. Need podiatry follow up upon discharge.  - Continue Augmentin until 1/23     # Type 2 DM  - Increase glargine to 15U every day (PTA Lantus was 10 U qday [pt was on 35 U BID previously, but dose decreased while previous admission at OSH])  - placed on high dose correction scale insulin, carb-based dosing, hypoglycemic protocol.     # Hypertension  BPs elevated 150s/90s   - continue  home amlodipine 10 mg, imdur 30 mg qday  - Switch metoprolol succ 50mg every day to Coreg 12.5mg BID for HTN in the setting of HFpEF    - hold lisinopril 20 mg  - prn Hydralazine for SBP > 160     # Hyperlipidemia:  Patient previously on Simvastatin 40 mg, will switch to high intensity statin in patient with known CAD  - Lipitor 40 mg daily    # Hypothyrodism:   -continue home levothyroxine 137 mcg     Code status: Full  FEN: Cardiac/Diabetic  DVT Prophylaxis: PTA apixaban  Dispo: discharge home in 2-3 days pending volume status    Patient discussed with Dr. Anand, who agrees with above plan.    Corina Dumont M.D.   PGY2 Internal Medicine   [phone]

## 2019-01-19 NOTE — PLAN OF CARE
D: Fluid overload d/t HF exacerbation     I: Monitored vitals and assessed pt status.   Changed: WOC ordered for amputated toe.   Running: PIV SL  PRN:      A: A0x4. VSS. Afebrile. Chronic AFIB. Voiding adequately. No complaints. 2nd toe amputation: no drainage, mild erythema, non-tender. Washed with wound cleanser and covered with gauze. WOC ordered for assessment and treatment plan of amputated toe. Patient wants team to explain ECHO findings. Pleasant and cooperative with cares.      P: Continue to monitor Pt status and report changes to Cards 1. Monitor creatinine for improvement. Monitor fluid intake/output.

## 2019-01-20 ENCOUNTER — APPOINTMENT (OUTPATIENT)
Dept: OCCUPATIONAL THERAPY | Facility: CLINIC | Age: 69
DRG: 292 | End: 2019-01-20
Payer: COMMERCIAL

## 2019-01-20 LAB
ANION GAP SERPL CALCULATED.3IONS-SCNC: 9 MMOL/L (ref 3–14)
ANION GAP SERPL CALCULATED.3IONS-SCNC: 9 MMOL/L (ref 3–14)
BUN SERPL-MCNC: 32 MG/DL (ref 7–30)
BUN SERPL-MCNC: 34 MG/DL (ref 7–30)
CA-I SERPL ISE-MCNC: 4.3 MG/DL (ref 4.4–5.2)
CA-I SERPL ISE-MCNC: 4.4 MG/DL (ref 4.4–5.2)
CALCIUM SERPL-MCNC: 8.2 MG/DL (ref 8.5–10.1)
CALCIUM SERPL-MCNC: 8.8 MG/DL (ref 8.5–10.1)
CHLORIDE SERPL-SCNC: 102 MMOL/L (ref 94–109)
CHLORIDE SERPL-SCNC: 98 MMOL/L (ref 94–109)
CO2 SERPL-SCNC: 27 MMOL/L (ref 20–32)
CO2 SERPL-SCNC: 28 MMOL/L (ref 20–32)
CREAT SERPL-MCNC: 2.94 MG/DL (ref 0.66–1.25)
CREAT SERPL-MCNC: 3.15 MG/DL (ref 0.66–1.25)
GFR SERPL CREATININE-BSD FRML MDRD: 19 ML/MIN/{1.73_M2}
GFR SERPL CREATININE-BSD FRML MDRD: 21 ML/MIN/{1.73_M2}
GLUCOSE BLDC GLUCOMTR-MCNC: 132 MG/DL (ref 70–99)
GLUCOSE BLDC GLUCOMTR-MCNC: 152 MG/DL (ref 70–99)
GLUCOSE BLDC GLUCOMTR-MCNC: 192 MG/DL (ref 70–99)
GLUCOSE BLDC GLUCOMTR-MCNC: 234 MG/DL (ref 70–99)
GLUCOSE SERPL-MCNC: 144 MG/DL (ref 70–99)
GLUCOSE SERPL-MCNC: 158 MG/DL (ref 70–99)
INR PPP: 1.43 (ref 0.86–1.14)
MAGNESIUM SERPL-MCNC: 1.9 MG/DL (ref 1.6–2.3)
MAGNESIUM SERPL-MCNC: 2 MG/DL (ref 1.6–2.3)
POTASSIUM SERPL-SCNC: 3.5 MMOL/L (ref 3.4–5.3)
POTASSIUM SERPL-SCNC: 3.7 MMOL/L (ref 3.4–5.3)
SODIUM SERPL-SCNC: 135 MMOL/L (ref 133–144)
SODIUM SERPL-SCNC: 138 MMOL/L (ref 133–144)

## 2019-01-20 PROCEDURE — 82330 ASSAY OF CALCIUM: CPT | Performed by: HOSPITALIST

## 2019-01-20 PROCEDURE — 83735 ASSAY OF MAGNESIUM: CPT | Performed by: STUDENT IN AN ORGANIZED HEALTH CARE EDUCATION/TRAINING PROGRAM

## 2019-01-20 PROCEDURE — 00000146 ZZHCL STATISTIC GLUCOSE BY METER IP

## 2019-01-20 PROCEDURE — 25000132 ZZH RX MED GY IP 250 OP 250 PS 637: Performed by: HOSPITALIST

## 2019-01-20 PROCEDURE — 97535 SELF CARE MNGMENT TRAINING: CPT | Mod: GO | Performed by: OCCUPATIONAL THERAPIST

## 2019-01-20 PROCEDURE — 36415 COLL VENOUS BLD VENIPUNCTURE: CPT | Performed by: HOSPITALIST

## 2019-01-20 PROCEDURE — 25000132 ZZH RX MED GY IP 250 OP 250 PS 637: Performed by: NURSE PRACTITIONER

## 2019-01-20 PROCEDURE — 25000132 ZZH RX MED GY IP 250 OP 250 PS 637: Performed by: STUDENT IN AN ORGANIZED HEALTH CARE EDUCATION/TRAINING PROGRAM

## 2019-01-20 PROCEDURE — 21400000 ZZH R&B CCU UMMC

## 2019-01-20 PROCEDURE — 25000128 H RX IP 250 OP 636: Performed by: STUDENT IN AN ORGANIZED HEALTH CARE EDUCATION/TRAINING PROGRAM

## 2019-01-20 PROCEDURE — 85610 PROTHROMBIN TIME: CPT | Performed by: STUDENT IN AN ORGANIZED HEALTH CARE EDUCATION/TRAINING PROGRAM

## 2019-01-20 PROCEDURE — 99233 SBSQ HOSP IP/OBS HIGH 50: CPT | Mod: GC | Performed by: INTERNAL MEDICINE

## 2019-01-20 PROCEDURE — 83735 ASSAY OF MAGNESIUM: CPT | Performed by: HOSPITALIST

## 2019-01-20 PROCEDURE — 82330 ASSAY OF CALCIUM: CPT | Performed by: STUDENT IN AN ORGANIZED HEALTH CARE EDUCATION/TRAINING PROGRAM

## 2019-01-20 PROCEDURE — 80048 BASIC METABOLIC PNL TOTAL CA: CPT | Performed by: HOSPITALIST

## 2019-01-20 PROCEDURE — 36415 COLL VENOUS BLD VENIPUNCTURE: CPT | Performed by: STUDENT IN AN ORGANIZED HEALTH CARE EDUCATION/TRAINING PROGRAM

## 2019-01-20 PROCEDURE — 40000133 ZZH STATISTIC OT WARD VISIT: Performed by: OCCUPATIONAL THERAPIST

## 2019-01-20 PROCEDURE — 80048 BASIC METABOLIC PNL TOTAL CA: CPT | Performed by: STUDENT IN AN ORGANIZED HEALTH CARE EDUCATION/TRAINING PROGRAM

## 2019-01-20 RX ORDER — MAGNESIUM SULFATE 1 G/100ML
1 INJECTION INTRAVENOUS ONCE
Status: COMPLETED | OUTPATIENT
Start: 2019-01-20 | End: 2019-01-20

## 2019-01-20 RX ORDER — POTASSIUM CHLORIDE 750 MG/1
20 TABLET, EXTENDED RELEASE ORAL ONCE
Status: COMPLETED | OUTPATIENT
Start: 2019-01-20 | End: 2019-01-20

## 2019-01-20 RX ORDER — CARVEDILOL 25 MG/1
25 TABLET ORAL 2 TIMES DAILY WITH MEALS
Status: DISCONTINUED | OUTPATIENT
Start: 2019-01-20 | End: 2019-01-25 | Stop reason: HOSPADM

## 2019-01-20 RX ADMIN — AMLODIPINE BESYLATE 10 MG: 10 TABLET ORAL at 08:04

## 2019-01-20 RX ADMIN — MAGNESIUM SULFATE IN DEXTROSE 1 G: 10 INJECTION, SOLUTION INTRAVENOUS at 09:50

## 2019-01-20 RX ADMIN — APIXABAN 2.5 MG: 2.5 TABLET, FILM COATED ORAL at 08:04

## 2019-01-20 RX ADMIN — LEVOTHYROXINE SODIUM 137 MCG: 25 TABLET ORAL at 21:20

## 2019-01-20 RX ADMIN — POTASSIUM CHLORIDE 20 MEQ: 750 TABLET, EXTENDED RELEASE ORAL at 08:12

## 2019-01-20 RX ADMIN — ISOSORBIDE MONONITRATE 30 MG: 30 TABLET, EXTENDED RELEASE ORAL at 08:04

## 2019-01-20 RX ADMIN — FUROSEMIDE 40 MG: 10 INJECTION, SOLUTION INTRAVENOUS at 15:05

## 2019-01-20 RX ADMIN — CLOPIDOGREL BISULFATE 75 MG: 75 TABLET ORAL at 08:03

## 2019-01-20 RX ADMIN — PANTOPRAZOLE SODIUM 40 MG: 40 TABLET, DELAYED RELEASE ORAL at 08:03

## 2019-01-20 RX ADMIN — FUROSEMIDE 40 MG: 10 INJECTION, SOLUTION INTRAVENOUS at 08:04

## 2019-01-20 RX ADMIN — APIXABAN 2.5 MG: 2.5 TABLET, FILM COATED ORAL at 21:20

## 2019-01-20 RX ADMIN — AMOXICILLIN AND CLAVULANATE POTASSIUM 1 TABLET: 875; 125 TABLET, FILM COATED ORAL at 09:52

## 2019-01-20 RX ADMIN — ATORVASTATIN CALCIUM 40 MG: 40 TABLET, FILM COATED ORAL at 21:20

## 2019-01-20 RX ADMIN — CARVEDILOL 12.5 MG: 12.5 TABLET, FILM COATED ORAL at 08:04

## 2019-01-20 RX ADMIN — AMOXICILLIN AND CLAVULANATE POTASSIUM 1 TABLET: 875; 125 TABLET, FILM COATED ORAL at 21:20

## 2019-01-20 RX ADMIN — CARVEDILOL 25 MG: 25 TABLET, FILM COATED ORAL at 18:02

## 2019-01-20 RX ADMIN — CALCIUM GLUCONATE 1 G: 98 INJECTION, SOLUTION INTRAVENOUS at 08:02

## 2019-01-20 ASSESSMENT — ACTIVITIES OF DAILY LIVING (ADL)
ADLS_ACUITY_SCORE: 15
ADLS_ACUITY_SCORE: 16
ADLS_ACUITY_SCORE: 15
ADLS_ACUITY_SCORE: 16

## 2019-01-20 ASSESSMENT — MIFFLIN-ST. JEOR: SCORE: 2017.29

## 2019-01-20 NOTE — PLAN OF CARE
D: Fluid overload d/t HF exacerbation     I: Monitored vitals and assessed pt status.      A: A0x4. VSS. Afebrile. Chronic AFIB. Voiding adequately. Denies pain. Would like compression stockings to decrease BLE edema. States that abdominal fullness improving, but doesn't feel BLE has decreased. Up independent. Dressing on amputated toe remains clean and dry.  Pleasant and cooperative with cares.      P: Continue to monitor Pt status and report changes to Cards 1. Monitor creatinine for improvement. Monitor fluid intake/output.

## 2019-01-20 NOTE — PROGRESS NOTES
[facility]   Cardiology   Progress Note     Interval History:  - Patient reports SOB, abd bloating improved; still w/ LE edema and would like compression stockings   - Denies any chest pain, chest tightness, lightheadedness or dizziness  - Net negative 2.1 L yesterday, wt 252 from 253lbs yesterday    - Walking around the wards independently     Physical Exam:  Temp:  [97.7  F (36.5  C)-99.2  F (37.3  C)] 98.1  F (36.7  C)  Heart Rate:  [79-86] 86  Resp:  [16-18] 16  BP: (131-153)/(90-99) 152/99  SpO2:  [93 %-97 %] 97 %    Wt:   Wt Readings from Last 5 Encounters:   01/20/19 114.6 kg (252 lb 9.6 oz)   01/14/19 119.3 kg (263 lb)   09/06/18 115.1 kg (253 lb 11.2 oz)   03/12/18 114.3 kg (252 lb)   03/03/18 109.6 kg (241 lb 9.6 oz)       General: NAD, on RA, Sitting up on the side of the bed   Neck: no JVD visible w/ patient sitting at 90 degrees. No cervical/supraclavicular LAD.   CV: Irregular rhythm. Regular rate. No murmur appreciated. No rubs or gallops. Peripheral radial pulse intact.  Resp: No increased work of breathing or use of accessory muscles, breathing comfortably on room air.  Lung sounds clear throughout/bilaterally  Abdomen: No obvious scars or hernias. Normal active bowel sounds.  Abdomen is soft. No distension, non-tender to palpation. No rebound tenderness or guarding. percussion WNL   Extremities: Warm. Capillary refill less than 3 sec. 4+/4 radial pulses bilaterally.  4+/4 pedal pulses bilaterally.  No cyanosis or clubbing. 2+ pitting edema in lower extremities up to the lower part of the shin, improved from yesterday   Lymphatic: No cervical, supraclavicular LAD.  Skin:  Warm and dry. No erythema, rashes, ulceration or diaphoresis.  Neuro: Alert and oriented x3.  Moving all extremities equally. Gait nml     Medications:    amLODIPine  10 mg Oral Daily     amoxicillin-clavulanate  1 tablet Oral Q12H     apixaban ANTICOAGULANT  2.5 mg Oral BID     atorvastatin  40 mg  Oral QPM     calcium gluconate  1 g Intravenous Once     carvedilol  12.5 mg Oral BID w/meals     clopidogrel  75 mg Oral Daily     furosemide  40 mg Intravenous BID     insulin aspart   Subcutaneous QAM AC     insulin aspart   Subcutaneous Daily with lunch     insulin aspart   Subcutaneous Daily with supper     insulin aspart  1-10 Units Subcutaneous TID AC     insulin aspart  1-7 Units Subcutaneous At Bedtime     insulin glargine  15 Units Subcutaneous QAM AC     isosorbide mononitrate  30 mg Oral Daily     levothyroxine  137 mcg Oral At Bedtime     magnesium sulfate  1 g Intravenous Once     pantoprazole  40 mg Oral QAM AC     potassium chloride  20 mEq Oral Once       - MEDICATION INSTRUCTIONS -         Labs:   CMP  Recent Labs   Lab 01/20/19  0600 01/19/19  1658 01/19/19  0544 01/18/19  1748  01/17/19  1652    138 139 137   < > 136   POTASSIUM 3.7 3.5 3.4 3.1*   < > 3.7   CHLORIDE 102 101 101 100   < > 103   CO2 27 28 25 25   < > 21   ANIONGAP 9 9 13 12   < > 12   * 174* 204* 237*   < > 317*   BUN 32* 32* 32* 28   < > 25   CR 2.94* 3.12* 3.11* 3.14*   < > 2.91*   GFRESTIMATED 21* 19* 19* 19*   < > 21*   GFRESTBLACK 24* 22* 23* 22*   < > 24*   BETTIE 8.2* 8.4* 8.4* 8.3*   < > 8.4*   MAG 1.9 2.1 1.9 1.8   < > 2.0   PROTTOTAL  --   --   --   --   --  7.5   ALBUMIN  --   --   --   --   --  3.3*   BILITOTAL  --   --   --   --   --  1.2   ALKPHOS  --   --   --   --   --  77   AST  --   --   --   --   --  10   ALT  --   --   --   --   --  17    < > = values in this interval not displayed.     CBC  Recent Labs   Lab 01/19/19  0855 01/17/19  2222 01/17/19  1652 01/14/19  0610   WBC 7.7 10.0 10.1 8.9   RBC 4.05* 4.11* 4.18* 4.23*   HGB 11.8* 11.8* 12.1* 12.1*   HCT 35.4* 36.3* 36.6* 37.7*   MCV 87 88 88 89   MCH 29.1 28.7 28.9 28.6   MCHC 33.3 32.5 33.1 32.1   RDW 13.0 13.1 13.2 13.0    292 301 279     INR  Recent Labs   Lab 01/20/19  0600 01/19/19  0544 01/18/19  0611   INR 1.43* 1.40* 1.28*     Echo  19:  Recent Results (from the past 4320 hour(s))   Echocardiogram Complete    Narrative    [id number]  [id number]  [id number]  [id number]^WILL^LUNA           [facility],[place]  Echocardiography Laboratory  [address] [place] [id number]     Name: PORTER YANCEY  MRN: [id number]  : 1950  Study Date: 2019 07:41 AM  Age: 68 yrs  Gender: Male  [place] [facility] [place] Study: CHF  Ordering Physician: LUNA SOLIS  Referring Physician: LUNA SOLIS  Performed By: Ree Valdez     BSA: 2.5 m2  Height: 76 in  Weight: 254 lb  HR: 78  BP: 136/77 mmHg  _____________________________________________________________________________  __        Procedure  Complete Portable Echo Adult. Contrast Optison. Patient was given 6 ml mixture  of 3 ml Optison and 6 ml saline. 3 ml wasted.  _____________________________________________________________________________  __        Interpretation Summary  Moderately (EF 35-40%) reduced left ventricular function is present. Regional  wall abnormality involving the septum, apex and inferior walls. No LV apical  thrombus.  Global right ventricular function is normal. The right ventricle is normal  size.  The inferior vena cava was dilated at 2.6 cm without respiratory variability,  consistent with increased right atrial pressure.  Estimated mean right atrial pressure is 15 mmHg (significantly elevated).  No significant valvular dysfunction.  No pericardial effusion is present.  Rhythm was atrial fibrillation during the study.     This study was compared with the study from 2018 .  IVC doesnot collapse in this study. RA pressure is more elevated. RWA is  similar.     _____________________________________________________________________________  __        Left Ventricle  Left ventricular size is normal. Left ventricular wall thickness is normal.  Diastolic function not assessed due to atrial fibrillation. Moderately (EF  35-  40%) reduced left ventricular function is present. Mid anteroseptum akinetic.  Inferior wall hypokinesis is present. Apical wall hypokinesis is present.     Right Ventricle  Global right ventricular function is normal. The right ventricle is normal  size.     Atria  Moderate left atrial enlargement is present. Moderate right atrial enlargement  is present. The atrial septum is intact as assessed by color Doppler .     Mitral Valve  The mitral valve is normal. Trace to mild mitral insufficiency is present.        Aortic Valve  Mild aortic valve sclerosis is present. The aortic valve is tricuspid. Trace  aortic insufficiency is present.     Tricuspid Valve  Trace to mild tricuspid insufficiency is present. Pulmonary artery systolic  pressure cannot be assessed.     Pulmonic Valve  The pulmonic valve is normal.     Vessels  The aorta root is normal. Estimated mean right atrial pressure is 15 mmHg  (significantly elevated). The inferior vena cava was dilated at 2.6 cm without  respiratory variability, consistent with increased right atrial pressure.     Pericardium  No pericardial effusion is present.        Compared to Previous Study  This study was compared with the study from 2018 . IVC doesnot collapse in  this study. RA pressure is more elevated. Otherwise direct comparison of the  images doesnot suggest significant change. RWA is similar.  _____________________________________________________________________________  __  MMode/2D Measurements & Calculations  IVSd: 0.97 cm     LVIDd: 5.5 cm  LVIDs: 4.2 cm  LVPWd: 1.0 cm  FS: 23.7 %  LV mass(C)d: 209.9 grams  LV mass(C)dI: 85.6 grams/m2  Ao root diam: 3.3 cm  asc Aorta Diam: 3.7 cm  LVOT diam: 2.3 cm  LVOT area: 4.2 cm2  LA Volume (BP): 116.0 ml  LA Volume Index (BP): 47.3 ml/m2  RWT: 0.37           Doppler Measurements & Calculations  MV E max john: 125.7 cm/sec  MV dec time: 0.14 sec  Ao V2 max: 179.0 cm/sec  Ao max P.0 mmHg  Ao V2 mean: 121.0  cm/sec  Ao mean P.0 mmHg  Ao V2 VTI: 34.6 cm  PRISCILA(I,D): 2.1 cm2  PRISCILA(V,D): 2.1 cm2  LV V1 max PG: 3.1 mmHg  LV V1 max: 88.7 cm/sec  LV V1 VTI: 17.7 cm  SV(LVOT): 73.5 ml  SI(LVOT): 30.0 ml/m2  PA V2 max: 94.7 cm/sec  PA max PG: 3.6 mmHg  PA acc time: 0.07 sec  AV Yovani Ratio (DI): 0.50  PRISCILA Index (cm2/m2): 0.87  E/E' av.2  Lateral E/e': 17.8  Medial E/e': 22.6        _____________________________________________________________________________  __        Report approved by: Kimberly CACERES 2019 09:38 AM                     ASSESSMENT/PLAN:  Jayro Elder is a 68 year old male with a history of type 2 diabetes complicated by prior diabetic foot infections, subacute left occipital CVA, coronary artery disease with stents, and associated ischemic cardiomyopathy with most recent echocardiogram completed 2018 showing an ejection fraction of 35-40% with indeterminate diastolic dysfunction, stress testing completed on 2018 showing a fixed large mid to distal anterior and anteroseptal defect, atrial fibrillation on chronic apixaban, untreated obstructive sleep apnea, recent admission with second toe osteomyelitis s/p toe amputation, who presented to ED with acute on chronic CHF exacerbtion.    Changes today:   - Increase Coreg 12.5mg-> 25mg BID for HTN  - Lasix 40mg IV BID for goal net negative 2-3L   - Lymphedema consulted     # Acute on chronic CHF exacerbation 2/2 ABNER, recent surgeries and infection  # Hx CAD s/p multiple stents  # Hx ICM with EF 35-40%  Presented with orthopnea, 9 kg weight gain, BLE, and worsening ANDREWS over the past week in the setting of known ischemic cardiomyopathy with a most recent EF 35-40% in 2018. On admission, he was found to have elevated BNP 8,810 with normal troponins and an unchanged EKG. Acute exacerbation is secondary to ABNER from previous hospitalization (Cr peaked at 3.45 on ), thought to be 2/2 antibiotics he had received (i.e. Vancomycin). TTE from  1/18 showed dilated IVC and elevated RA pressures consistent with fluid overload; EF still at 35-40% and regional wall abnormalities involving the septum, apex, and inferior walls that was also seen on echo from September.  - Strict I&Os  - Daily weights (goal wt <250lbs)   - Monitor K/Mg (q12 BMP)  - Lasix 40mg IV BID for goal net negative 2-3L    - Continue plavix 75 mg, imdur 30 mg, atorva 40 mg  - Switched metoprolol succ 50mg every day to Coreg 12.5mg BID for HTN in the setting of HFpEF on 1/19, increased to 25mg BID for HTN on 1/20  - Holding lisinopril 2/2 ABNER  - CORE following, pt to see new cardiologist in [place] and follow up with heart failure clinic when ready for discharge  - Lymphedema consulted     # Afib  [id number] score 5 (age, HTN, DM, stroke). Discovered in 2017 after patient suffered an left occipital stroke thought to be embolic;  Rates controlled; it seems his cardiologist had considered rhythm control in the past but this was never pursued  - Coreg as above   - Continue apixaban 2.5mg given ABNER (plan to increase to 5mg BID once this resolves)     # ABNER on CKD  Baseline is 0.8-0.9. Had ABNER while previous admission for toe amputation. Peak was 3.45 on 1/13. Slowly improving. Could be multifactorial, secondary to cardiorenal, ABx use (he was at first on vancomycin), Hx of DM, and lisinopril. Renal US was normal. Cr stable-2.91 on admission, 2.94 today    - Diuresis as above  - Monitor, and avoid nephrotoxins  - Hold lisinopril  - Will make nephrology referral on discharge      # Recent osteomyelitis s/p rt second toe amputation   Had surgery on 1/6 and 1/8. Received vancomycin -> ceftriaxone while inpatient, discharged on Augmentin for 10 days, until 1/23. Need podiatry follow up upon discharge.  - Continue Augmentin until 1/23     # Type 2 DM  - Glargine to 15U every day (PTA Lantus was 10 U qday [pt was on 35 U BID previously, but dose decreased while previous admission at OSH])  - placed  on high dose correction scale insulin, carb-based dosing, hypoglycemic protocol.     # Hypertension  - continue home amlodipine 10 mg, imdur 30 mg qday  - Switched metoprolol succ 50mg every day to Coreg 12.5mg BID for HTN in the setting of HFpEF on 1/19, increased to 25mg BID for HTN on 1/20   - hold lisinopril 20 mg  - prn Hydralazine for SBP > 160     # Hyperlipidemia:  Patient previously on Simvastatin 40 mg, will switch to high intensity statin in patient with known CAD  - Lipitor 40 mg daily    # Hypothyrodism:   -continue home levothyroxine 137 mcg     Code status: Full  FEN: Cardiac/Diabetic  DVT Prophylaxis: PTA apixaban  Dispo: discharge home in 2-3 days pending volume status    Patient discussed with Dr. Anand, who agrees with above plan.    Corina Dumont M.D.   PGY2 Internal Medicine   [phone]

## 2019-01-20 NOTE — PLAN OF CARE
Fluid Imbalance (Heart Failure)  Fluid Balance  1/19/2019 1810 - Improving by Alis Curran, RN   D: Fluid overload and SOB     I: Monitored vitals and assessed pt status.   Changed: Stated Lasix IVP  Running: PIV SL  PRN: Nothing given     A: A0x4. VSS. Afebrile. Urinating adequately in urinal. Pt up independently. Makes needs known. BG have been in 200's for 2 days, Team ordered carb coverage for meals. Trending down. Ambulating halls frequently, elevating feet while in bed.      P: Continue to monitor Pt status and report changes to Cards 1.     Temp:  [97.7  F (36.5  C)-98.7  F (37.1  C)] 98.7  F (37.1  C)  Pulse:  [77] 77  Heart Rate:  [76-89] 80  Resp:  [18] 18  BP: (131-153)/() 153/97  SpO2:  [93 %-97 %] 97 %

## 2019-01-20 NOTE — PLAN OF CARE
Discharge Planner OT   6C  Patient plan for discharge: home asap  Current status: Pt reports IND 4x ambulation daily.  C/o discomfort in LE as LE edema has not decreased. Confirmed with basic measurements of ankle and calf.  +1cm at calf bilaterally and -1cm at ankle.  Encouraged pt to meet with edema therapist which he has declined previously, pt agreeable today as conservative measures have not been susscessful.   Barriers to return to prior living situation: edema, fluid overload  Recommendations for discharge: home w APRN  Rationale for recommendations: limited LBD due to edema       Entered by: Nichole Lassiter 01/20/2019 2:24 PM

## 2019-01-21 ENCOUNTER — APPOINTMENT (OUTPATIENT)
Dept: OCCUPATIONAL THERAPY | Facility: CLINIC | Age: 69
DRG: 292 | End: 2019-01-21
Payer: COMMERCIAL

## 2019-01-21 LAB
ANION GAP SERPL CALCULATED.3IONS-SCNC: 10 MMOL/L (ref 3–14)
ANION GAP SERPL CALCULATED.3IONS-SCNC: 8 MMOL/L (ref 3–14)
BUN SERPL-MCNC: 33 MG/DL (ref 7–30)
BUN SERPL-MCNC: 34 MG/DL (ref 7–30)
CALCIUM SERPL-MCNC: 8.5 MG/DL (ref 8.5–10.1)
CALCIUM SERPL-MCNC: 8.6 MG/DL (ref 8.5–10.1)
CHLORIDE SERPL-SCNC: 100 MMOL/L (ref 94–109)
CHLORIDE SERPL-SCNC: 98 MMOL/L (ref 94–109)
CO2 SERPL-SCNC: 27 MMOL/L (ref 20–32)
CO2 SERPL-SCNC: 29 MMOL/L (ref 20–32)
CREAT SERPL-MCNC: 2.98 MG/DL (ref 0.66–1.25)
CREAT SERPL-MCNC: 3.1 MG/DL (ref 0.66–1.25)
ERYTHROCYTE [DISTWIDTH] IN BLOOD BY AUTOMATED COUNT: 12.8 % (ref 10–15)
GFR SERPL CREATININE-BSD FRML MDRD: 20 ML/MIN/{1.73_M2}
GFR SERPL CREATININE-BSD FRML MDRD: 20 ML/MIN/{1.73_M2}
GLUCOSE BLDC GLUCOMTR-MCNC: 116 MG/DL (ref 70–99)
GLUCOSE BLDC GLUCOMTR-MCNC: 194 MG/DL (ref 70–99)
GLUCOSE BLDC GLUCOMTR-MCNC: 195 MG/DL (ref 70–99)
GLUCOSE BLDC GLUCOMTR-MCNC: 214 MG/DL (ref 70–99)
GLUCOSE SERPL-MCNC: 120 MG/DL (ref 70–99)
GLUCOSE SERPL-MCNC: 199 MG/DL (ref 70–99)
HCT VFR BLD AUTO: 33.2 % (ref 40–53)
HGB BLD-MCNC: 10.9 G/DL (ref 13.3–17.7)
MAGNESIUM SERPL-MCNC: 2 MG/DL (ref 1.6–2.3)
MAGNESIUM SERPL-MCNC: 2 MG/DL (ref 1.6–2.3)
MCH RBC QN AUTO: 28.6 PG (ref 26.5–33)
MCHC RBC AUTO-ENTMCNC: 32.8 G/DL (ref 31.5–36.5)
MCV RBC AUTO: 87 FL (ref 78–100)
PLATELET # BLD AUTO: 282 10E9/L (ref 150–450)
POTASSIUM SERPL-SCNC: 3.4 MMOL/L (ref 3.4–5.3)
POTASSIUM SERPL-SCNC: 3.8 MMOL/L (ref 3.4–5.3)
RBC # BLD AUTO: 3.81 10E12/L (ref 4.4–5.9)
SODIUM SERPL-SCNC: 135 MMOL/L (ref 133–144)
SODIUM SERPL-SCNC: 137 MMOL/L (ref 133–144)
WBC # BLD AUTO: 6.6 10E9/L (ref 4–11)

## 2019-01-21 PROCEDURE — 25000128 H RX IP 250 OP 636: Performed by: STUDENT IN AN ORGANIZED HEALTH CARE EDUCATION/TRAINING PROGRAM

## 2019-01-21 PROCEDURE — 25000132 ZZH RX MED GY IP 250 OP 250 PS 637: Performed by: STUDENT IN AN ORGANIZED HEALTH CARE EDUCATION/TRAINING PROGRAM

## 2019-01-21 PROCEDURE — 97110 THERAPEUTIC EXERCISES: CPT | Mod: GO | Performed by: OCCUPATIONAL THERAPIST

## 2019-01-21 PROCEDURE — 83735 ASSAY OF MAGNESIUM: CPT | Performed by: HOSPITALIST

## 2019-01-21 PROCEDURE — 40000133 ZZH STATISTIC OT WARD VISIT: Performed by: OCCUPATIONAL THERAPIST

## 2019-01-21 PROCEDURE — 97530 THERAPEUTIC ACTIVITIES: CPT | Mod: GO | Performed by: OCCUPATIONAL THERAPIST

## 2019-01-21 PROCEDURE — 36415 COLL VENOUS BLD VENIPUNCTURE: CPT | Performed by: HOSPITALIST

## 2019-01-21 PROCEDURE — 00000146 ZZHCL STATISTIC GLUCOSE BY METER IP

## 2019-01-21 PROCEDURE — 99232 SBSQ HOSP IP/OBS MODERATE 35: CPT | Performed by: STUDENT IN AN ORGANIZED HEALTH CARE EDUCATION/TRAINING PROGRAM

## 2019-01-21 PROCEDURE — 97535 SELF CARE MNGMENT TRAINING: CPT | Mod: GO | Performed by: OCCUPATIONAL THERAPIST

## 2019-01-21 PROCEDURE — 25000132 ZZH RX MED GY IP 250 OP 250 PS 637: Performed by: HOSPITALIST

## 2019-01-21 PROCEDURE — 21400000 ZZH R&B CCU UMMC

## 2019-01-21 PROCEDURE — 85027 COMPLETE CBC AUTOMATED: CPT | Performed by: HOSPITALIST

## 2019-01-21 PROCEDURE — 80048 BASIC METABOLIC PNL TOTAL CA: CPT | Performed by: HOSPITALIST

## 2019-01-21 PROCEDURE — G0463 HOSPITAL OUTPT CLINIC VISIT: HCPCS

## 2019-01-21 PROCEDURE — 25000132 ZZH RX MED GY IP 250 OP 250 PS 637: Performed by: NURSE PRACTITIONER

## 2019-01-21 RX ORDER — POTASSIUM CHLORIDE 750 MG/1
40 TABLET, EXTENDED RELEASE ORAL ONCE
Status: COMPLETED | OUTPATIENT
Start: 2019-01-21 | End: 2019-01-21

## 2019-01-21 RX ORDER — POTASSIUM CHLORIDE 750 MG/1
10 TABLET, EXTENDED RELEASE ORAL ONCE
Status: COMPLETED | OUTPATIENT
Start: 2019-01-21 | End: 2019-01-21

## 2019-01-21 RX ORDER — POTASSIUM CHLORIDE 750 MG/1
20 TABLET, EXTENDED RELEASE ORAL ONCE
Status: COMPLETED | OUTPATIENT
Start: 2019-01-21 | End: 2019-01-21

## 2019-01-21 RX ADMIN — POTASSIUM CHLORIDE 20 MEQ: 750 TABLET, EXTENDED RELEASE ORAL at 10:29

## 2019-01-21 RX ADMIN — AMOXICILLIN AND CLAVULANATE POTASSIUM 1 TABLET: 875; 125 TABLET, FILM COATED ORAL at 10:30

## 2019-01-21 RX ADMIN — ISOSORBIDE MONONITRATE 30 MG: 30 TABLET, EXTENDED RELEASE ORAL at 08:30

## 2019-01-21 RX ADMIN — CARVEDILOL 25 MG: 25 TABLET, FILM COATED ORAL at 08:30

## 2019-01-21 RX ADMIN — ATORVASTATIN CALCIUM 40 MG: 40 TABLET, FILM COATED ORAL at 19:51

## 2019-01-21 RX ADMIN — AMLODIPINE BESYLATE 10 MG: 10 TABLET ORAL at 08:30

## 2019-01-21 RX ADMIN — PANTOPRAZOLE SODIUM 40 MG: 40 TABLET, DELAYED RELEASE ORAL at 08:30

## 2019-01-21 RX ADMIN — APIXABAN 5 MG: 5 TABLET, FILM COATED ORAL at 19:51

## 2019-01-21 RX ADMIN — FUROSEMIDE 40 MG: 10 INJECTION, SOLUTION INTRAVENOUS at 16:54

## 2019-01-21 RX ADMIN — POTASSIUM CHLORIDE 10 MEQ: 750 TABLET, EXTENDED RELEASE ORAL at 18:37

## 2019-01-21 RX ADMIN — CARVEDILOL 25 MG: 25 TABLET, FILM COATED ORAL at 18:37

## 2019-01-21 RX ADMIN — APIXABAN 2.5 MG: 2.5 TABLET, FILM COATED ORAL at 08:30

## 2019-01-21 RX ADMIN — POTASSIUM CHLORIDE 20 MEQ: 750 TABLET, EXTENDED RELEASE ORAL at 04:59

## 2019-01-21 RX ADMIN — FUROSEMIDE 40 MG: 10 INJECTION, SOLUTION INTRAVENOUS at 08:30

## 2019-01-21 RX ADMIN — AMOXICILLIN AND CLAVULANATE POTASSIUM 1 TABLET: 875; 125 TABLET, FILM COATED ORAL at 21:44

## 2019-01-21 RX ADMIN — CLOPIDOGREL BISULFATE 75 MG: 75 TABLET ORAL at 08:30

## 2019-01-21 RX ADMIN — LEVOTHYROXINE SODIUM 137 MCG: 25 TABLET ORAL at 21:44

## 2019-01-21 ASSESSMENT — ACTIVITIES OF DAILY LIVING (ADL)
ADLS_ACUITY_SCORE: 15

## 2019-01-21 ASSESSMENT — MIFFLIN-ST. JEOR: SCORE: 1995.51

## 2019-01-21 NOTE — PROGRESS NOTES
[facility]   Cardiology   Progress Note     Interval History:  - NAEO   - Denies any chest pain, chest tightness, lightheadedness or dizziness  - Net negative 2.4L yesterday, wt 247 from 252lbs yesterday    - Walking around the wards independently     Physical Exam:  Temp:  [98  F (36.7  C)-98.5  F (36.9  C)] 98.1  F (36.7  C)  Heart Rate:  [76-89] 76  Resp:  [16-20] 20  BP: (128-153)/(85-95) 153/95  SpO2:  [94 %-97 %] 94 %    Wt:   Wt Readings from Last 5 Encounters:   01/21/19 112.4 kg (247 lb 12.8 oz)   01/14/19 119.3 kg (263 lb)   09/06/18 115.1 kg (253 lb 11.2 oz)   03/12/18 114.3 kg (252 lb)   03/03/18 109.6 kg (241 lb 9.6 oz)       General: NAD, on RA, Sitting up on the side of the bed   Neck: no JVD visible w/ patient sitting at 90 degrees. No cervical/supraclavicular LAD.   CV: Irregular rhythm. Regular rate. No murmur appreciated. No rubs or gallops. Peripheral radial pulse intact.  Resp: No increased work of breathing or use of accessory muscles, breathing comfortably on room air.  Lung sounds clear throughout/bilaterally  Abdomen: No obvious scars or hernias. Normal active bowel sounds.  Abdomen is soft. No distension, non-tender to palpation. No rebound tenderness or guarding. percussion WNL   Extremities: Warm. Capillary refill less than 3 sec. 4+/4 radial pulses bilaterally.  4+/4 pedal pulses bilaterally.  No cyanosis or clubbing. 2+ pitting edema in lower extremities up to the lower part of the shin, stable from yesterday  Lymphatic: No cervical, supraclavicular LAD.  Skin:  Warm and dry. No erythema, rashes, ulceration or diaphoresis.  Neuro: Alert and oriented x3.  Moving all extremities equally. Gait nml     Medications:    amLODIPine  10 mg Oral Daily     amoxicillin-clavulanate  1 tablet Oral Q12H     apixaban ANTICOAGULANT  2.5 mg Oral BID     atorvastatin  40 mg Oral QPM     carvedilol  25 mg Oral BID w/meals     clopidogrel  75 mg Oral Daily     furosemide  40  mg Intravenous BID     insulin aspart   Subcutaneous QAM AC     insulin aspart   Subcutaneous Daily with lunch     insulin aspart   Subcutaneous Daily with supper     insulin aspart  1-10 Units Subcutaneous TID AC     insulin aspart  1-7 Units Subcutaneous At Bedtime     insulin glargine  15 Units Subcutaneous QAM AC     isosorbide mononitrate  30 mg Oral Daily     levothyroxine  137 mcg Oral At Bedtime     pantoprazole  40 mg Oral QAM AC       - MEDICATION INSTRUCTIONS -         Labs:   CMP  Recent Labs   Lab 01/21/19  0457 01/20/19  1703 01/20/19  0600 01/19/19  1658  01/17/19  1652    135 138 138   < > 136   POTASSIUM 3.4 3.5 3.7 3.5   < > 3.7   CHLORIDE 98 98 102 101   < > 103   CO2 27 28 27 28   < > 21   ANIONGAP 10 9 9 9   < > 12   * 144* 158* 174*   < > 317*   BUN 33* 34* 32* 32*   < > 25   CR 2.98* 3.15* 2.94* 3.12*   < > 2.91*   GFRESTIMATED 20* 19* 21* 19*   < > 21*   GFRESTBLACK 24* 22* 24* 22*   < > 24*   BETTIE 8.6 8.8 8.2* 8.4*   < > 8.4*   MAG 2.0 2.0 1.9 2.1   < > 2.0   PROTTOTAL  --   --   --   --   --  7.5   ALBUMIN  --   --   --   --   --  3.3*   BILITOTAL  --   --   --   --   --  1.2   ALKPHOS  --   --   --   --   --  77   AST  --   --   --   --   --  10   ALT  --   --   --   --   --  17    < > = values in this interval not displayed.     CBC  Recent Labs   Lab 01/21/19  0457 01/19/19  0855 01/17/19  2222 01/17/19  1652   WBC 6.6 7.7 10.0 10.1   RBC 3.81* 4.05* 4.11* 4.18*   HGB 10.9* 11.8* 11.8* 12.1*   HCT 33.2* 35.4* 36.3* 36.6*   MCV 87 87 88 88   MCH 28.6 29.1 28.7 28.9   MCHC 32.8 33.3 32.5 33.1   RDW 12.8 13.0 13.1 13.2    303 292 301     INR  Recent Labs   Lab 01/20/19  0600 01/19/19  0544 01/18/19  0611   INR 1.43* 1.40* 1.28*     Echo 1/18/19:  Recent Results (from the past 4320 hour(s))   Echocardiogram Complete    Narrative    [id number]  [id number]  [id number]  [id number]^WILL^LUNA           [facility],[place]  Echocardiography Laboratory  [address]  [place] [address]     Name: PORTER YANCEY  MRN: [id number]  : 1950  Study Date: 2019 07:41 AM  Age: 68 yrs  Gender: Male  Patient Location: [facility] [place]  Reason For Study: CHF  Ordering Physician: LUNA SOLIS  Referring Physician: LUNA SOLIS  Performed By: Ree Valdez     BSA: 2.5 m2  Height: 76 in  Weight: 254 lb  HR: 78  BP: 136/77 mmHg  _____________________________________________________________________________  __        Procedure  Complete Portable Echo Adult. Contrast Optison. Patient was given 6 ml mixture  of 3 ml Optison and 6 ml saline. 3 ml wasted.  _____________________________________________________________________________  __        Interpretation Summary  Moderately (EF 35-40%) reduced left ventricular function is present. Regional  wall abnormality involving the septum, apex and inferior walls. No LV apical  thrombus.  Global right ventricular function is normal. The right ventricle is normal  size.  The inferior vena cava was dilated at 2.6 cm without respiratory variability,  consistent with increased right atrial pressure.  Estimated mean right atrial pressure is 15 mmHg (significantly elevated).  No significant valvular dysfunction.  No pericardial effusion is present.  Rhythm was atrial fibrillation during the study.     This study was compared with the study from 2018 .  IVC doesnot collapse in this study. RA pressure is more elevated. RWA is  similar.     _____________________________________________________________________________  __        Left Ventricle  Left ventricular size is normal. Left ventricular wall thickness is normal.  Diastolic function not assessed due to atrial fibrillation. Moderately (EF 35-  40%) reduced left ventricular function is present. Mid anteroseptum akinetic.  Inferior wall hypokinesis is present. Apical wall hypokinesis is present.     Right Ventricle  Global right ventricular function is normal. The right ventricle  is normal  size.     Atria  Moderate left atrial enlargement is present. Moderate right atrial enlargement  is present. The atrial septum is intact as assessed by color Doppler .     Mitral Valve  The mitral valve is normal. Trace to mild mitral insufficiency is present.        Aortic Valve  Mild aortic valve sclerosis is present. The aortic valve is tricuspid. Trace  aortic insufficiency is present.     Tricuspid Valve  Trace to mild tricuspid insufficiency is present. Pulmonary artery systolic  pressure cannot be assessed.     Pulmonic Valve  The pulmonic valve is normal.     Vessels  The aorta root is normal. Estimated mean right atrial pressure is 15 mmHg  (significantly elevated). The inferior vena cava was dilated at 2.6 cm without  respiratory variability, consistent with increased right atrial pressure.     Pericardium  No pericardial effusion is present.        Compared to Previous Study  This study was compared with the study from 2018 . IVC doesnot collapse in  this study. RA pressure is more elevated. Otherwise direct comparison of the  images doesnot suggest significant change. RWA is similar.  _____________________________________________________________________________  __  MMode/2D Measurements & Calculations  IVSd: 0.97 cm     LVIDd: 5.5 cm  LVIDs: 4.2 cm  LVPWd: 1.0 cm  FS: 23.7 %  LV mass(C)d: 209.9 grams  LV mass(C)dI: 85.6 grams/m2  Ao root diam: 3.3 cm  asc Aorta Diam: 3.7 cm  LVOT diam: 2.3 cm  LVOT area: 4.2 cm2  LA Volume (BP): 116.0 ml  LA Volume Index (BP): 47.3 ml/m2  RWT: 0.37           Doppler Measurements & Calculations  MV E max john: 125.7 cm/sec  MV dec time: 0.14 sec  Ao V2 max: 179.0 cm/sec  Ao max P.0 mmHg  Ao V2 mean: 121.0 cm/sec  Ao mean P.0 mmHg  Ao V2 VTI: 34.6 cm  PRISCILA(I,D): 2.1 cm2  PRISCILA(V,D): 2.1 cm2  LV V1 max PG: 3.1 mmHg  LV V1 max: 88.7 cm/sec  LV V1 VTI: 17.7 cm  SV(LVOT): 73.5 ml  SI(LVOT): 30.0 ml/m2  PA V2 max: 94.7 cm/sec  PA max PG: 3.6 mmHg  PA acc  time: 0.07 sec  AV Yovani Ratio (DI): 0.50  PRISCILA Index (cm2/m2): 0.87  E/E' av.2  Lateral E/e': 17.8  Medial E/e': 22.6        _____________________________________________________________________________  __        Report approved by: Kimberly CACERES 2019 09:38 AM                     ASSESSMENT/PLAN:  Jayro Elder is a 68 year old male with a history of type 2 diabetes complicated by prior diabetic foot infections, subacute left occipital CVA, coronary artery disease with stents, and associated ischemic cardiomyopathy with most recent echocardiogram completed 2018 showing an ejection fraction of 35-40% with indeterminate diastolic dysfunction, stress testing completed on 2018 showing a fixed large mid to distal anterior and anteroseptal defect, atrial fibrillation on chronic apixaban, untreated obstructive sleep apnea, recent admission with second toe osteomyelitis s/p toe amputation, who presented to ED with acute on chronic CHF exacerbtion.    Changes today:   - Lasix 40mg IV BID for goal net negative 2-3L   - Increase apixaban to 5mg BID   - Nephrology consulted given persistent ABNER     # Acute on chronic CHF exacerbation 2/2 ABNER, recent surgeries and infection  # Hx CAD s/p multiple stents  # Hx ICM with EF 35-40%  Presented with orthopnea, 9 kg weight gain, BLE, and worsening ANDREWS over the past week in the setting of known ischemic cardiomyopathy with a most recent EF 35-40% in 2018. On admission, he was found to have elevated BNP 8,810 with normal troponins and an unchanged EKG. Acute exacerbation is secondary to ABNER from previous hospitalization (Cr peaked at 3.45 on ), thought to be 2/2 antibiotics he had received (i.e. Vancomycin). TTE from  showed dilated IVC and elevated RA pressures consistent with fluid overload; EF still at 35-40% and regional wall abnormalities involving the septum, apex, and inferior walls that was also seen on echo from September.  - Strict  I&Os  - Daily weights (goal wt <250lbs)   - Monitor K/Mg (q12 BMP)  - Lasix 40mg IV BID for goal net negative 2-3L    - Continue plavix 75 mg, imdur 30 mg, atorva 40 mg  - Switched metoprolol succ 50mg every day to Coreg 12.5mg BID for HTN in the setting of HFpEF on 1/19, increased to 25mg BID for HTN on 1/20  - Holding lisinopril 2/2 ABNER  - CORE following, pt to see new cardiologist in [place] and follow up with heart failure clinic when ready for discharge  - Lymphedema consulted     # Afib  [id number] score 5 (age, HTN, DM, stroke). Discovered in 2017 after patient suffered an left occipital stroke thought to be embolic;  Rates controlled; it seems his cardiologist had considered rhythm control in the past but this was never pursued  - Coreg as above   - Increase apixaban to 5mg BID      # ABNER on CKD  Baseline is 0.8-0.9. Had ABNER while previous admission for toe amputation. Peak was 3.45 on 1/13. Slowly improving. Could be multifactorial, secondary to cardiorenal, ABx use (he was at first on vancomycin), Hx of DM, and lisinopril. Renal US was normal. Cr stable-2.91 on admission, 2.94 today    - Diuresis as above  - Monitor, and avoid nephrotoxins  - Hold lisinopril  - Nephrology consulted, recs appreciated      # Recent osteomyelitis s/p rt second toe amputation   Had surgery on 1/6 and 1/8. Received vancomycin -> ceftriaxone while inpatient, discharged on Augmentin for 10 days, until 1/23. Needs podiatry follow up upon discharge.  - Continue Augmentin until 1/23     # Type 2 DM  - Glargine to 15U every day (PTA Lantus was 10 U qday [pt was on 35 U BID previously, but dose decreased while previous admission at OSH])  - placed on high dose correction scale insulin, carb-based dosing, hypoglycemic protocol.     # Hypertension  - continue home amlodipine 10 mg, imdur 30 mg qday  - Switched metoprolol succ 50mg every day to Coreg 12.5mg BID for HTN in the setting of HFpEF on 1/19, increased to 25mg BID for HTN on  1/20   - hold lisinopril 20 mg  - prn Hydralazine for SBP > 160     # Hyperlipidemia:  Patient previously on Simvastatin 40 mg, will switch to high intensity statin in patient with known CAD  - Lipitor 40 mg daily    # Hypothyrodism:   -continue home levothyroxine 137 mcg     Code status: Full  FEN: Cardiac/Diabetic  DVT Prophylaxis: PTA apixaban  Dispo: discharge home in 2-3 days pending volume status    Patient discussed with Dr. Bell, who agrees with above plan.    Corina Dumont M.D.   PGY2 Internal Medicine   [phone]

## 2019-01-21 NOTE — PLAN OF CARE
"/78 (BP Location: Right arm)   Pulse 77   Temp 98.3  F (36.8  C) (Oral)   Resp 18   Ht 1.93 m (6' 4\")   Wt 112.4 kg (247 lb 12.8 oz)   SpO2 98%   BMI 30.16 kg/m      Pt diuresing and ambulating independently.  Admitted following surgery at outside hospital of diabetic wound on foot, pt asking as to suture management of foot, who will remove them?      Pt denies pain.  Elevating feet with trundled bed.  Walks halls.  Diet tolerated, and insulin regiment providing consistent BG levels.   Pt has limited oral intake through the day, continue to monitor.     "

## 2019-01-21 NOTE — PROGRESS NOTES
[facility] Nurse Inpatient Wound Assessment   Reason for consultation: Evaluate and treat right 2nd toe wound     Assessment  right 2nd toe wound due to Surgical Wound  Status: initial assessment, appears to be healing well     Treatment Plan  Right 2nd toe wound: Daily  :  Cleanse wound with sterile NS, pat dry.  Cover with dry gauze and tape     Orders Written  Recommended provider order: return to Podiatry clinic for postop f/u after discharge   WOC Nurse follow-up plan:signing off  Nursing to notify the Provider(s) and re-consult the WO Nurse if wound(s) deteriorates or new skin concern.    Patient History  According to provider note(s):  68 year old male with a history of type 2 diabetes complicated by prior diabetic foot infections, subacute left occipital CVA, coronary artery disease with stents, and associated ischemic cardiomyopathy who presented to ED with acute on chronic CHF exacerbtion.     Objective Data  Active Diet Order  Orders Placed This Encounter      2 Gram Sodium Diet      Output:   I/O last 3 completed shifts:  In: 1290 [P.O.:1290]  Out: 4125 [Urine:4125]    Risk Assessment:   Sensory Perception: 3-->slightly limited  Moisture: 3-->occasionally moist  Activity: 4-->walks frequently  Mobility: 3-->slightly limited  Nutrition: 3-->adequate  Friction and Shear: 3-->no apparent problem  Wilmer Score: 19                          Labs:   Recent Labs   Lab 01/21/19  0457 01/20/19  0600  01/17/19  1652   ALBUMIN  --   --   --  3.3*   HGB 10.9*  --    < > 12.1*   INR  --  1.43*   < >  --    WBC 6.6  --    < > 10.1    < > = values in this interval not displayed.       Physical Exam  Skin inspection: right foot    Wound Location:  Right 2nd toe    Wound History: Recent osteomyelitis s/p rt second toe amputation   Had surgery on 1/6 with revision and I&D 1/8. Received vancomycin -> ceftriaxone while inpatient, discharged on Augmentin for 10 days, until 1/23. Compression tube sock in place.   Discharged with dry  dressing only with plan to f/u with primary MD (per pt)  Surgical incision well approximated with sutures, no dehiscence no increased warmth, no erythema,  Scant amt of dried serosanguinous drainage on dressing.      Pain: insensate on foot.      Interventions  Current support surface: Standard  Atmos Air mattress  Visual inspection of wound(s) completed  Wound Care: done per plan of care  Supplies: at bedside  Discussed plan of care with Patient

## 2019-01-21 NOTE — PLAN OF CARE
Edema 6C: Initial edema evaluation completed. Patient with progressing LE edema, skin intact with soft 2+ pitting distally. Dressing in place on second R toe. Patient fit with size F comperm compression stockings from MTPs to knee creases for long term management of LE edema. Compression stockings can be worn 23/24 hours per day and removed daily for skin cares - see patient care order for details.

## 2019-01-21 NOTE — PROGRESS NOTES
01/21/19 1178   General Information   Discipline OT   Onset of Edema (acute onset with hospitalization, per patient.)   Affected Body Part(s) Right LE;Left LE   Etiology Comments cardiac disease, volume overload.    Edema Precaution Comments Dressing in place on second R toe.   Pain   Pain comments patient reports no pain at this time.    Edema Examination / Assessment   Skin Condition Pitting;Dryness;Intact   Skin Condition Comments Skin intact with mild dryness and soft 2+ pitting in distal portion. Dressing in place on second R toe.    ROM   Range of Motion (WFL) no deficits were identified   Strength   Strength (WFL) (generalized weakness throughout.)   Sensation   Sensation (WFL) no deficits were identified  (light touch intact. )   Assessment/Plan   Patient presents with Edema   Assessment Recommend use of compression stockings to manage long term edema.    Assessment of Occupational Performance 1-3 Performance Deficits   Identified Performance Deficits Decreased functional mobility.    Clinical Decision Making (Complexity) Low complexity   Planned Edema Interventions Gradient compression bandaging;Fit for compression garment;Exercises;Precautions to prevent infection/exacerbation;Education   Treatment Frequency 3 times/wk   Treatment Duration 1 week   Patient, Family and/or Staff in agreement with plan of care. Yes   Risks and benefits of treatment have been explained. Yes   Total Evaluation Time   Total Evaluation Time (Minutes) 5

## 2019-01-21 NOTE — PLAN OF CARE
D: Fluid overload d/t HF exacerbation     I: Monitored vitals and assessed pt status.      A: A0x4. VSS. Afebrile. Chronic AFIB. Voiding adequately. Denies pain. Abdominal fullness improving and appetite returning. Patient still worried about LE edema. Lymphedema consult ordered by MD. Patient has sutured RT toe s/p amputation from a week ago. Erythema + no drainage. Wound cleanser, pat dry and covered with gauze. Patient up independent, pleasant and cooperative with cares.    P: Continue to monitor Pt status and report changes to Cards 1. Monitor creatinine for improvement. Monitor fluid intake/output. Lymphedema consult ordered.

## 2019-01-21 NOTE — PLAN OF CARE
Discharge Planner OT   Patient plan for discharge: Home  Current status: Pt able to complete 15 minutes of therapeutic exercise on NuStep with VSS and Pt able to ambulate to and from therapy gym  Barriers to return to prior living situation: Decreased activity tolerance and endurance  Recommendations for discharge: Home with assist with heavy IADL  Rationale for recommendations: Pt will likely require assistance with heavy IADL due to increase fatigue       Entered by: Car Hillman 01/21/2019 2:20 PM

## 2019-01-22 ENCOUNTER — APPOINTMENT (OUTPATIENT)
Dept: OCCUPATIONAL THERAPY | Facility: CLINIC | Age: 69
DRG: 292 | End: 2019-01-22
Payer: COMMERCIAL

## 2019-01-22 ENCOUNTER — TELEPHONE (OUTPATIENT)
Dept: CARDIOLOGY | Facility: CLINIC | Age: 69
End: 2019-01-22

## 2019-01-22 DIAGNOSIS — I42.9 CARDIOMYOPATHY (H): Primary | ICD-10-CM

## 2019-01-22 LAB
ANION GAP SERPL CALCULATED.3IONS-SCNC: 9 MMOL/L (ref 3–14)
ANION GAP SERPL CALCULATED.3IONS-SCNC: 9 MMOL/L (ref 3–14)
BUN SERPL-MCNC: 35 MG/DL (ref 7–30)
BUN SERPL-MCNC: 38 MG/DL (ref 7–30)
CALCIUM SERPL-MCNC: 8.3 MG/DL (ref 8.5–10.1)
CALCIUM SERPL-MCNC: 8.5 MG/DL (ref 8.5–10.1)
CHLORIDE SERPL-SCNC: 96 MMOL/L (ref 94–109)
CHLORIDE SERPL-SCNC: 97 MMOL/L (ref 94–109)
CO2 SERPL-SCNC: 28 MMOL/L (ref 20–32)
CO2 SERPL-SCNC: 28 MMOL/L (ref 20–32)
CREAT SERPL-MCNC: 3.02 MG/DL (ref 0.66–1.25)
CREAT SERPL-MCNC: 3.16 MG/DL (ref 0.66–1.25)
ERYTHROCYTE [DISTWIDTH] IN BLOOD BY AUTOMATED COUNT: 12.8 % (ref 10–15)
GFR SERPL CREATININE-BSD FRML MDRD: 19 ML/MIN/{1.73_M2}
GFR SERPL CREATININE-BSD FRML MDRD: 20 ML/MIN/{1.73_M2}
GLUCOSE BLDC GLUCOMTR-MCNC: 154 MG/DL (ref 70–99)
GLUCOSE BLDC GLUCOMTR-MCNC: 196 MG/DL (ref 70–99)
GLUCOSE BLDC GLUCOMTR-MCNC: 196 MG/DL (ref 70–99)
GLUCOSE SERPL-MCNC: 188 MG/DL (ref 70–99)
GLUCOSE SERPL-MCNC: 230 MG/DL (ref 70–99)
HCT VFR BLD AUTO: 32 % (ref 40–53)
HGB BLD-MCNC: 10.5 G/DL (ref 13.3–17.7)
MAGNESIUM SERPL-MCNC: 1.7 MG/DL (ref 1.6–2.3)
MAGNESIUM SERPL-MCNC: 1.9 MG/DL (ref 1.6–2.3)
MCH RBC QN AUTO: 28.5 PG (ref 26.5–33)
MCHC RBC AUTO-ENTMCNC: 32.8 G/DL (ref 31.5–36.5)
MCV RBC AUTO: 87 FL (ref 78–100)
PLATELET # BLD AUTO: 260 10E9/L (ref 150–450)
POTASSIUM SERPL-SCNC: 3.7 MMOL/L (ref 3.4–5.3)
POTASSIUM SERPL-SCNC: 3.9 MMOL/L (ref 3.4–5.3)
RBC # BLD AUTO: 3.69 10E12/L (ref 4.4–5.9)
SODIUM SERPL-SCNC: 133 MMOL/L (ref 133–144)
SODIUM SERPL-SCNC: 134 MMOL/L (ref 133–144)
WBC # BLD AUTO: 6.5 10E9/L (ref 4–11)

## 2019-01-22 PROCEDURE — 25000132 ZZH RX MED GY IP 250 OP 250 PS 637: Performed by: STUDENT IN AN ORGANIZED HEALTH CARE EDUCATION/TRAINING PROGRAM

## 2019-01-22 PROCEDURE — 97110 THERAPEUTIC EXERCISES: CPT | Mod: GO

## 2019-01-22 PROCEDURE — 40000133 ZZH STATISTIC OT WARD VISIT

## 2019-01-22 PROCEDURE — 99232 SBSQ HOSP IP/OBS MODERATE 35: CPT | Performed by: PHYSICIAN ASSISTANT

## 2019-01-22 PROCEDURE — 40000133 ZZH STATISTIC OT WARD VISIT: Performed by: OCCUPATIONAL THERAPIST

## 2019-01-22 PROCEDURE — 25000132 ZZH RX MED GY IP 250 OP 250 PS 637: Performed by: HOSPITALIST

## 2019-01-22 PROCEDURE — 25000132 ZZH RX MED GY IP 250 OP 250 PS 637: Performed by: NURSE PRACTITIONER

## 2019-01-22 PROCEDURE — 00000146 ZZHCL STATISTIC GLUCOSE BY METER IP

## 2019-01-22 PROCEDURE — 83735 ASSAY OF MAGNESIUM: CPT | Performed by: HOSPITALIST

## 2019-01-22 PROCEDURE — 21400000 ZZH R&B CCU UMMC

## 2019-01-22 PROCEDURE — 97535 SELF CARE MNGMENT TRAINING: CPT | Mod: GO | Performed by: OCCUPATIONAL THERAPIST

## 2019-01-22 PROCEDURE — 97530 THERAPEUTIC ACTIVITIES: CPT | Mod: GO

## 2019-01-22 PROCEDURE — 36415 COLL VENOUS BLD VENIPUNCTURE: CPT | Performed by: HOSPITALIST

## 2019-01-22 PROCEDURE — 25000128 H RX IP 250 OP 636: Performed by: STUDENT IN AN ORGANIZED HEALTH CARE EDUCATION/TRAINING PROGRAM

## 2019-01-22 PROCEDURE — 85027 COMPLETE CBC AUTOMATED: CPT | Performed by: HOSPITALIST

## 2019-01-22 PROCEDURE — 80048 BASIC METABOLIC PNL TOTAL CA: CPT | Performed by: HOSPITALIST

## 2019-01-22 RX ADMIN — FUROSEMIDE 40 MG: 10 INJECTION, SOLUTION INTRAVENOUS at 08:14

## 2019-01-22 RX ADMIN — AMOXICILLIN AND CLAVULANATE POTASSIUM 1 TABLET: 875; 125 TABLET, FILM COATED ORAL at 10:28

## 2019-01-22 RX ADMIN — CARVEDILOL 25 MG: 25 TABLET, FILM COATED ORAL at 08:15

## 2019-01-22 RX ADMIN — FUROSEMIDE 40 MG: 10 INJECTION, SOLUTION INTRAVENOUS at 15:26

## 2019-01-22 RX ADMIN — LEVOTHYROXINE SODIUM 137 MCG: 25 TABLET ORAL at 22:26

## 2019-01-22 RX ADMIN — CARVEDILOL 25 MG: 25 TABLET, FILM COATED ORAL at 17:50

## 2019-01-22 RX ADMIN — ISOSORBIDE MONONITRATE 30 MG: 30 TABLET, EXTENDED RELEASE ORAL at 08:15

## 2019-01-22 RX ADMIN — AMLODIPINE BESYLATE 10 MG: 10 TABLET ORAL at 08:14

## 2019-01-22 RX ADMIN — ATORVASTATIN CALCIUM 40 MG: 40 TABLET, FILM COATED ORAL at 19:39

## 2019-01-22 RX ADMIN — APIXABAN 5 MG: 5 TABLET, FILM COATED ORAL at 08:15

## 2019-01-22 RX ADMIN — APIXABAN 5 MG: 5 TABLET, FILM COATED ORAL at 19:39

## 2019-01-22 RX ADMIN — PANTOPRAZOLE SODIUM 40 MG: 40 TABLET, DELAYED RELEASE ORAL at 08:15

## 2019-01-22 RX ADMIN — AMOXICILLIN AND CLAVULANATE POTASSIUM 1 TABLET: 875; 125 TABLET, FILM COATED ORAL at 22:54

## 2019-01-22 ASSESSMENT — ACTIVITIES OF DAILY LIVING (ADL)
ADLS_ACUITY_SCORE: 15

## 2019-01-22 ASSESSMENT — MIFFLIN-ST. JEOR: SCORE: 1986.44

## 2019-01-22 NOTE — CONSULTS
[facility] Orthopedic Consultation    Jayro Elder MRN# [id number]   Age: 68 year old YOB: 1950   Date of Admission:  1/17/2019    Reason for consult: Suture removal right foot   Requesting physician: Princess Anand MD   Level of consult: One time consult for procedure/wound check          Impression and Recommendation:   Impression:  Jayro Elder is a 68 year old male admitted for congestive heart failure who presents 2 wks s/p right 2nd toe amputation by Podiatry at [facility] with routine healing     Recomendations:  Suture removal performed today.  Pt can cover area as needed for comfort.  OK to shower and get incision wet.  No soaking in a tub or pool.  Wear post-op shoe when up ambulating for protection.  Follow-up with Podiatrist upon discharge from hospital for wound check. All questions answered.         Chief Complaint:   Retained sutures         History of Present Illness:   This patient is a 68 year old male with a significant past medical history of congestive heart failure, DM and CVA who is admitted for acute CHF exacerbation, and Ortho was consulted to do a wound check and suture removal on his right foot 2 wks s/p 2nd toe amputation d/t pressure ulcer.  Patient reports he has limited feeling in his foot and toes.  He walks in a post-op shoe.  He reports he feels his foot is otherwise doing well.  No other concerns.  History obtained from patient interview and chart review.        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     Cardiomyopathy (H)      Cellulitis of left lower extremity 3/13/2018     Cerebral infarction (H)     eye sight decreased in peripheral of right side and blurry     Diabetic ulcer of left midfoot associated with type 2 diabetes mellitus, with fat layer exposed (H) 3/13/2018      Essential hypertension, benign 11/13/2002     Generalized osteoarthrosis, unspecified site 11/13/2002     Microalbuminuria 3/13/2018    X1     Myocardial infarction (H)      Neuropathy      Sepsis (H)      Sleep apnea     doesn't use it     Substance abuse (H)      Syncope, unspecified syncope type 3/13/2018     Thyroid disease     takes medicine     Tobacco use disorder 11/13/2002     Type 2 diabetes mellitus with diabetic peripheral angiopathy without gangrene, unspecified long term insulin use status 2005             Past Surgical History:     Past Surgical History:   Procedure Laterality Date     AMPUTATE TOE(S) Right 1/6/2019    Procedure: Right open second toe partial amputation;  Surgeon: Sabino Garcia DPM;  Location: [facility] OR     AMPUTATE TOE(S) Right 1/8/2019    Procedure: 1.  Revision right second toe amputation with resection of distal half of proximal phalanx with full closure.    2.  Debridement of callus/preulcerative lesion, distal left second toe and plantar left first metatarsophalangeal joint.;  Surgeon: Ryan Bhagat DPM;  Location: [facility] OR     ANGIOGRAM  6/29/05    subtotal occ.mid LAD,SUSAN mid LAD     ANGIOGRAM  7/05    mild CAD,patent stent,no flow-limiting lesions,sev.LV dysf.LAD enlarged     ANGIOGRAM  2/09    Sev.single vessel disease,Mod LV dysf.distal LAD 90%,70-75% mid lad just before prev stent,SUSAN to prox.mid LAD, endeavor to distal LAD     ANGIOGRAM  11/13/13    restenosis, stent LAD     BACK SURGERY      back surgery x3     C NONSPECIFIC PROCEDURE      Laminectomy x 3 - (1983 x 2 & 1990)     C NONSPECIFIC PROCEDURE Bilateral 1998    Bunionectomy     C NONSPECIFIC PROCEDURE  1959    Gingival surgery at age 9     HEART CATH LEFT HEART CATH  06/13/2017    SUSAN to OM3     INCISION AND DRAINAGE TOE, COMBINED Bilateral 9/11/2018    Procedure: COMBINED INCISION AND DRAINAGE TOE;  1) Irrigation and debridement ulcer left great toe  2) Amputation 3rd toe right foot at distal  "interphalangeal joint level;  Surgeon: Miki Harp MD;  Location: [facility] OR     ORTHOPEDIC SURGERY      bunion surgery both feet     ORTHOPEDIC SURGERY      left shoulder     SOFT TISSUE SURGERY      debridement of toe numerous time     VASCULAR SURGERY      7 stents in heart                 Allergies:     Allergies   Allergen Reactions     No Known Allergies              Medications:   Medication reviewed with patient and in chart.            Physical Exam:     /70 (BP Location: Right arm)   Pulse 70   Temp 98  F (36.7  C) (Oral)   Resp 16   Ht 1.93 m (6' 4\")   Wt 111.5 kg (245 lb 12.8 oz)   SpO2 96%   BMI 29.92 kg/m     General: awake, alert, cooperative, no apparent distress, appears stated age  Bilateral LE\"s with significant swelling and edema.  Right foot with incision between 1st and 3rd toe with mild swelling, no erythema or drainage, and good healing.  Sutures in place.  Sutures removed today without difficulty and no signs of wound dehiscence.             Laboratory date:   CBC:  Lab Results   Component Value Date    WBC 6.5 01/22/2019    HGB 10.5 (L) 01/22/2019     01/22/2019       BMP:  Lab Results   Component Value Date     01/22/2019    POTASSIUM 3.9 01/22/2019    CHLORIDE 97 01/22/2019    CO2 28 01/22/2019    BUN 35 (H) 01/22/2019    CR 3.02 (H) 01/22/2019    ANIONGAP 9 01/22/2019    BETTIE 8.5 01/22/2019     (H) 01/22/2019       Inflammatory Markers:  Lab Results   Component Value Date    WBC 6.5 01/22/2019    CRP 81.6 (H) 09/08/2018    SED 15 09/08/2018       Cultures:  Recent Labs   Lab 01/17/19  1903   CULT No growth       Time Patient Evaluated: 1350      Brianda Perez PA-C  Department of Orthopedics  [phone]    "

## 2019-01-22 NOTE — CONSULTS
Nephrology Initial Consult  January 21, 2019      Jayro Elder MRN:[id number] YOB: 1950  Date of Admission:1/17/2019  Primary care provider: Davion Cordova  Requesting physician: Princess Anand MD    67 yo M with DM type 1, CAD, CHF, osteomyelitis s/p toe amputation and ABNER.    ASSESSMENT AND RECOMMENDATIONS:    1.  ABNER: Pt has previously normal renal function (Cr 0.8 on 1/6) and minimal proteinuria so this is not diabetic nephropathy.  Pt developed ABNER in the setting of osteomyelitis, CEI use and vanco.  Renal US on normal and UA is bland.  Expect resolution.  Pt has been on PPI for many years so this is unlikely to be the cause.  No contrasted studies in the past several months   - monitor Cr   - avoid nephrotoxins   - would not restart CEI until Cr is back to <1.0    2. Volume status: CHF symptoms have resolved with 5 L fluid off.  Wt nearly back to baseline.     - agree with continued diuretics to maintain clear lungs    3. Diabetes management: pt has poor glycemic control and atributes this to lack of coverage for insulin.  In addition, he apparently has not gotten recommended laser treatment of his retinopathy b/o high cost.  Pt is at risk of blindness   - ask SW to review his insurance and offer suggestions      -  Recommendations were communicated to primary team via note      Shayla Norris MD   [phone]    REASON FOR CONSULT: ABNER    HISTORY OF PRESENT ILLNESS:  Admitting provider and nursing notes reviewed    Jayro Elder is a 68 year old man with DM 1 since 2005 S/P toe amputation 1/6 adm 1/17 with CHF and ABNER.  Pt has history of CAD S/P stent placement with LVEF 34-40% and permanent a fib.  He was hospitalized 1/6-1/8 for osteomyelitis in his toe and amputation. Cr ml from BL 1.0 to 3.0 and he was seen by nephrology Dr Johnathan Juarez, who thought the ABNER was multifactorial from infection, CEI,  hypotension and vanco.  Antibiotics were adjusted and the CEI was  stopped.  Since discharge 1/8 the patient has had increasing SOB and weight gain of 15 pounds.  He was [facility] to [facility] and has been aggressively diuresed from 117 kg to 112 kg. Cr remains 3.0.    Pt has no history of stones. He has minimal proteinuria with microalb 49mg/dl in 3/2018. Denies voiding symptoms.  Pt has been on PPI for many years.  Does not take OTC meds    Diabetes control remains poor (A1c 11.7).  The patient tells me he cannot afford insulin so there is no point in checking his glu level.  He has proliferative retinopathy but has not had laser treatment because he cannot afford the $2100 charge per eye.       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     Cardiomyopathy (H)      Cellulitis of left lower extremity 3/13/2018     Cerebral infarction (H)     eye sight decreased in peripheral of right side and blurry     Diabetic ulcer of left midfoot associated with type 2 diabetes mellitus, with fat layer exposed (H) 3/13/2018     Essential hypertension, benign 11/13/2002     Generalized osteoarthrosis, unspecified site 11/13/2002     Microalbuminuria 3/13/2018    X1     Myocardial infarction (H)      Neuropathy      Sepsis (H)      Sleep apnea     doesn't use it     Substance abuse (H)      Syncope, unspecified syncope type 3/13/2018     Thyroid disease     takes medicine     Tobacco use disorder 11/13/2002     Type 2 diabetes mellitus with diabetic peripheral angiopathy without gangrene, unspecified long term insulin use status 2005       Past Surgical History:   Procedure Laterality Date     AMPUTATE TOE(S) Right 1/6/2019    Procedure: Right open second toe partial amputation;  Surgeon: Sabino Garcia DPM;  Location: [facility] OR     AMPUTATE TOE(S) Right 1/8/2019    Procedure: 1.  Revision right  second toe amputation with resection of distal half of proximal phalanx with full closure.    2.  Debridement of callus/preulcerative lesion, distal left second toe and plantar left first metatarsophalangeal joint.;  Surgeon: Ryan Bhagat DPM;  Location: [facility] OR     ANGIOGRAM  6/29/05    subtotal occ.mid LAD,SUSAN mid LAD     ANGIOGRAM  7/05    mild CAD,patent stent,no flow-limiting lesions,sev.LV dysf.LAD enlarged     ANGIOGRAM  2/09    Sev.single vessel disease,Mod LV dysf.distal LAD 90%,70-75% mid lad just before prev stent,SUSAN to prox.mid LAD, endeavor to distal LAD     ANGIOGRAM  11/13/13    restenosis, stent LAD     BACK SURGERY      back surgery x3     C NONSPECIFIC PROCEDURE      Laminectomy x 3 - (1983 x 2 & 1990)     C NONSPECIFIC PROCEDURE Bilateral 1998    Bunionectomy     C NONSPECIFIC PROCEDURE  1959    Gingival surgery at age 9     HEART CATH LEFT HEART CATH  06/13/2017    SUSAN to OM3     INCISION AND DRAINAGE TOE, COMBINED Bilateral 9/11/2018    Procedure: COMBINED INCISION AND DRAINAGE TOE;  1) Irrigation and debridement ulcer left great toe  2) Amputation 3rd toe right foot at distal interphalangeal joint level;  Surgeon: Miki Harp MD;  Location: [facility] OR     ORTHOPEDIC SURGERY      bunion surgery both feet     ORTHOPEDIC SURGERY      left shoulder     SOFT TISSUE SURGERY      debridement of toe numerous time     VASCULAR SURGERY      7 stents in heart        MEDICATIONS:  PTA Meds  Prior to Admission medications    Medication Sig Last Dose Taking? Auth Provider   amLODIPine (NORVASC) 5 MG tablet Take 2 tablets (10 mg) by mouth daily 1/17/2019 at Unknown time Yes Meenu Catherine MD   amoxicillin-clavulanate (AUGMENTIN) 875-125 MG tablet Take 1 tablet by mouth daily for 10 days 1/17/2019 at Unknown time Yes Meenu Catherine MD   apixaban ANTICOAGULANT (ELIQUIS) 5 MG tablet Take 1 tablet (5 mg) by mouth 2 times daily HOLD this medication until instructed to resume by your primary MD Past  Month at ON HOLD Yes Meenu Catherine MD   Cholecalciferol (VITAMIN D3) 2000 units TABS Take 1 tablet by mouth daily 1/16/2019 at Unknown time Yes Unknown, Entered By History   clopidogrel (PLAVIX) 75 MG tablet Take 1 tablet (75 mg) by mouth daily Past Month at ON HOLD Yes Johnathan Mckeon MD   insulin aspart (NOVOLOG FLEXPEN) 100 UNIT/ML pen Inject Subcutaneous 3 times daily (with meals) USes about 7-10 units with meal depending 1/16/2019 at Unknown time Yes Unknown, Entered By History   insulin glargine U-300 (TOUJEO) 300 UNIT/ML injection Inject 35 units in the morning and 35 units in the evening. 1/17/2019 at AM Yes Unknown, Entered By History   insulin pen needle 31G X 6 MM Use  as directed. 1/17/2019 at Unknown time Yes Vinicio Cook MD   isosorbide mononitrate (IMDUR) 30 MG 24 hr tablet Take 1 tablet (30 mg) by mouth daily 1/17/2019 at Unknown time Yes Johnathan Mckeon MD   levothyroxine (SYNTHROID, LEVOTHROID) 137 MCG tablet Take 137 mcg by mouth At Bedtime  1/16/2019 at Unknown time Yes Henrry Figueroa PA-C   lisinopril (PRINIVIL/ZESTRIL) 20 MG tablet Take 1 tablet (20 mg) by mouth 2 times daily HOLD this medication until instructed to resume per primary MD Past Month at ON HOLD Yes Meenu Catherine MD   metoprolol succinate (TOPROL-XL) 100 MG 24 hr tablet Take 0.5 tablets (50 mg) by mouth At Bedtime 1/16/2019 at Unknown time Yes Anthony Baker DO   pantoprazole (PROTONIX) 40 MG enteric coated tablet Take 1 tablet (40 mg) by mouth every morning (before breakfast) 1/17/2019 at Unknown time Yes Ana Frankel MD   simvastatin (ZOCOR) 40 MG tablet Take 1 tablet (40 mg) by mouth At Bedtime 1/16/2019 at Unknown time Yes Johnathan Mckeon MD   nitroglycerin (NITROSTAT) 0.4 MG sublingual tablet Place 1 tablet (0.4 mg) under the tongue every 5 minutes as needed for chest pain Unknown at Unknown time  Davion Cordova PA-C   order for DME Equipment being ordered: Other: surgical shoe male  XL  Treatment Diagnosis: sp right 2nd toe amputaion   Ryan Bhagat DPM   order for DME Equipment being ordered: Walker Wheels () and Walker ()  Treatment Diagnosis: Impaired gait, heel WB bilaterally.   Herb Zurita III, MD      Current Meds    amLODIPine  10 mg Oral Daily     amoxicillin-clavulanate  1 tablet Oral Q12H     apixaban ANTICOAGULANT  5 mg Oral BID     atorvastatin  40 mg Oral QPM     carvedilol  25 mg Oral BID w/meals     furosemide  40 mg Intravenous BID     insulin aspart   Subcutaneous QAM AC     insulin aspart   Subcutaneous Daily with lunch     insulin aspart   Subcutaneous Daily with supper     insulin aspart  1-10 Units Subcutaneous TID AC     insulin aspart  1-7 Units Subcutaneous At Bedtime     [START ON 2019] insulin glargine  17 Units Subcutaneous QAM AC     isosorbide mononitrate  30 mg Oral Daily     levothyroxine  137 mcg Oral At Bedtime     pantoprazole  40 mg Oral QAM AC     Infusion Meds    - MEDICATION INSTRUCTIONS -         ALLERGIES:    Allergies   Allergen Reactions     No Known Allergies        REVIEW OF SYSTEMS:  A comprehensive of systems was negative except as noted above.    SOCIAL HISTORY:   Social History     Socioeconomic History     Marital status:      Spouse name: Not on file     Number of children: Not on file     Years of education: Not on file     Highest education level: Not on file   Social Needs     Financial resource strain: Not on file     Food insecurity - worry: Not on file     Food insecurity - inability: Not on file     Transportation needs - medical: Not on file     Transportation needs - non-medical: Not on file   Occupational History     Not on file   Tobacco Use     Smoking status: Former Smoker     Packs/day: 1.00     Years: 25.00     Pack years: 25.00     Types: Cigarettes     Last attempt to quit: 2005     Years since quittin.5     Smokeless tobacco: Never Used   Substance and Sexual Activity      "Alcohol use: Yes     Alcohol/week: 2.4 oz     Types: 4 Shots of liquor per week     Comment: almost every day     Drug use: No     Sexual activity: Yes     Partners: Female   Other Topics Concern     Parent/sibling w/ CABG, MI or angioplasty before 65F 55M? Yes      Service Not Asked     Blood Transfusions Not Asked     Caffeine Concern No     Comment: 3 cups daily     Occupational Exposure Not Asked     Hobby Hazards Not Asked     Sleep Concern Not Asked     Stress Concern Not Asked     Weight Concern Not Asked     Special Diet Yes     Comment: watching carb intake     Back Care Not Asked     Exercise No     Comment: some walking     Bike Helmet Not Asked     Seat Belt Not Asked     Self-Exams Not Asked   Social History Narrative     Not on file     Reviewed with patient No one accompanies Jayro Elder in hospital room    FAMILY MEDICAL HISTORY:   Family History   Problem Relation Age of Onset     Cancer - colorectal Sister      Thyroid Disease Brother      Cardiovascular Brother      Diabetes Brother      Cancer Father         tumor in chest and throat     Arthritis Mother      Thyroid Disease Mother      Diabetes Mother      Glaucoma No family hx of      Macular Degeneration No family hx of      Reviewed with patient   PHYSICAL EXAM:   Temp  Av.8  F (36.6  C)  Min: 95.9  F (35.5  C)  Max: 99.2  F (37.3  C)      Pulse  Av.3  Min: 61  Max: 100 Resp  Av.4  Min: 6  Max: 50  FiO2 (%)  Av %  Min: 100 %  Max: 100 %  SpO2  Av %  Min: 90 %  Max: 100 %       BP (!) 147/98 (BP Location: Right arm)   Pulse 77   Temp 97.7  F (36.5  C) (Oral)   Resp 18   Ht 1.93 m (6' 4\")   Wt 112.4 kg (247 lb 12.8 oz)   SpO2 97%   BMI 30.16 kg/m     Date 19 0700 - 19 0659   Shift 8280-3967 3141-7311 6843-7274 24 Hour Total   INTAKE   P.O. 800   800   I.V. 15   15   Shift Total(mL/kg) 815(7.25)   815(7.25)   OUTPUT   Urine 1480 600  2080   Shift Total(mL/kg) 1480(13.17) 600(5.34)  " 2080(18.51)   Weight (kg) 112.4 112.4 112.4 112.4      Admit Weight: 121.7 kg (268 lb 4.8 oz)     GENERAL APPEARANCE: no distress,  awake  EYES: no scleral icterus, pupils equal  Lymphatics: no cervical or supraclavicular LAD  Pulmonary: lungs clear to auscultation with equal breath sounds bilaterally,  CV: irregular rhythm, normal rate, no rub   - JVD none   - Edema present  GI: soft, nontender, normal bowel sounds  MS: no evidence of inflammation in joints, no muscle tenderness  : no teresa  SKIN: multiple ecchymoses    LABS:   CMP  Recent Labs   Lab 01/21/19  1710 01/21/19  0457 01/20/19  1703 01/20/19  0600  01/17/19  1652    135 135 138   < > 136   POTASSIUM 3.8 3.4 3.5 3.7   < > 3.7   CHLORIDE 100 98 98 102   < > 103   CO2 29 27 28 27   < > 21   ANIONGAP 8 10 9 9   < > 12   * 199* 144* 158*   < > 317*   BUN 34* 33* 34* 32*   < > 25   CR 3.10* 2.98* 3.15* 2.94*   < > 2.91*   GFRESTIMATED 20* 20* 19* 21*   < > 21*   GFRESTBLACK 23* 24* 22* 24*   < > 24*   BETTIE 8.5 8.6 8.8 8.2*   < > 8.4*   MAG 2.0 2.0 2.0 1.9   < > 2.0   PROTTOTAL  --   --   --   --   --  7.5   ALBUMIN  --   --   --   --   --  3.3*   BILITOTAL  --   --   --   --   --  1.2   ALKPHOS  --   --   --   --   --  77   AST  --   --   --   --   --  10   ALT  --   --   --   --   --  17    < > = values in this interval not displayed.     CBC  Recent Labs   Lab 01/21/19  0457 01/19/19  0855 01/17/19  2222 01/17/19  1652   HGB 10.9* 11.8* 11.8* 12.1*   WBC 6.6 7.7 10.0 10.1   RBC 3.81* 4.05* 4.11* 4.18*   HCT 33.2* 35.4* 36.3* 36.6*   MCV 87 87 88 88   MCH 28.6 29.1 28.7 28.9   MCHC 32.8 33.3 32.5 33.1   RDW 12.8 13.0 13.1 13.2    303 292 301     INR  Recent Labs   Lab 01/20/19  0600 01/19/19  0544 01/18/19  0611   INR 1.43* 1.40* 1.28*     ABGNo lab results found in last 7 days.   URINE STUDIES  Recent Labs   Lab Test 01/17/19  1903 01/09/19  1606 09/06/18  1542 01/31/12  1825   COLOR Light Yellow Yellow Yellow Yellow   APPEARANCE Clear  Clear Clear Clear   URINEGLC 300* 150* >499* 70*   URINEBILI Negative Negative Negative Negative   URINEKETONE Negative Negative 20* Negative   SG 1.005 1.006 1.013 1.019   UBLD Negative Negative Negative Negative   URINEPH 5.0 5.0 5.0 5.0   PROTEIN Negative 30* Negative 10*   NITRITE Negative Negative Negative Negative   LEUKEST Trace* Negative Negative Negative   RBCU <1 1 0 1   WBCU 3 2 1 1     No lab results found.  PTH  No lab results found.  IRON STUDIES  No lab results found.    IMAGING:  CXR: pulm edema 1/17  Renal US normal kidneys 1/12      Shayla Norris MD

## 2019-01-22 NOTE — PROGRESS NOTES
[facility]   Cardiology   Progress Note     Interval History:  - NAEO   - Lightheaded while ambulating. May need to stop imdur while diuresing.  - Net output 2.8L yesterday. Continues to resposne well to lasix. Will monitor today  - ortho consult placed for suture removal/assess surgical wound  - lantus to 20U    Physical Exam:  Temp:  [97.7  F (36.5  C)-98.5  F (36.9  C)] 98  F (36.7  C)  Pulse:  [70] 70  Heart Rate:  [73-85] 76  Resp:  [16-18] 16  BP: (108-159)/() 108/70  SpO2:  [94 %-98 %] 96 %    Wt:   Wt Readings from Last 5 Encounters:   01/22/19 111.5 kg (245 lb 12.8 oz)   01/14/19 119.3 kg (263 lb)   09/06/18 115.1 kg (253 lb 11.2 oz)   03/12/18 114.3 kg (252 lb)   03/03/18 109.6 kg (241 lb 9.6 oz)       General: NAD, on RA, eating breakfast   Neck: no JVD visible w/ patient sitting at 90 degrees.    CV: Irregular rhythm. Regular rate. No murmur appreciated. No rubs or gallops. Peripheral radial pulse intact.  Resp: No increased work of breathing or use of accessory muscles, breathing comfortably on room air.  Lung sounds clear throughout/bilaterally  Abdomen: No obvious scars or hernias. Normal active bowel sounds.  Abdomen is soft. No distension, non-tender to palpation. No rebound tenderness or guarding. percussion WNL   Extremities: Warm. Capillary refill less than 3 sec. 4+/4 radial pulses bilaterally.  4+/4 pedal pulses bilaterally.  No cyanosis or clubbing. Leg wraps to tibial tuberosity, 1+ pitting edema at the knee. Right foot with amputated second digit, with simple interrupted sutures in place. No oozing/drainage.   Skin:  Warm and dry. No erythema, rashes, ulceration or diaphoresis.  Neuro: Alert and oriented x3.  Moving all extremities equally    Medications:    amLODIPine  10 mg Oral Daily     amoxicillin-clavulanate  1 tablet Oral Q12H     apixaban ANTICOAGULANT  5 mg Oral BID     atorvastatin  40 mg Oral QPM     carvedilol  25 mg Oral BID w/meals      furosemide  40 mg Intravenous BID     insulin aspart   Subcutaneous QAM AC     insulin aspart   Subcutaneous Daily with lunch     insulin aspart   Subcutaneous Daily with supper     insulin aspart  1-10 Units Subcutaneous TID AC     insulin aspart  1-7 Units Subcutaneous At Bedtime     [START ON 1/23/2019] insulin glargine  20 Units Subcutaneous QAM AC     isosorbide mononitrate  30 mg Oral Daily     levothyroxine  137 mcg Oral At Bedtime     pantoprazole  40 mg Oral QAM AC       - MEDICATION INSTRUCTIONS -         Labs:   CMP  Recent Labs   Lab 01/22/19  0522 01/21/19  1710 01/21/19  0457 01/20/19  1703  01/17/19  1652    137 135 135   < > 136   POTASSIUM 3.9 3.8 3.4 3.5   < > 3.7   CHLORIDE 97 100 98 98   < > 103   CO2 28 29 27 28   < > 21   ANIONGAP 9 8 10 9   < > 12   * 120* 199* 144*   < > 317*   BUN 35* 34* 33* 34*   < > 25   CR 3.02* 3.10* 2.98* 3.15*   < > 2.91*   GFRESTIMATED 20* 20* 20* 19*   < > 21*   GFRESTBLACK 23* 23* 24* 22*   < > 24*   BETTIE 8.5 8.5 8.6 8.8   < > 8.4*   MAG 1.9 2.0 2.0 2.0   < > 2.0   PROTTOTAL  --   --   --   --   --  7.5   ALBUMIN  --   --   --   --   --  3.3*   BILITOTAL  --   --   --   --   --  1.2   ALKPHOS  --   --   --   --   --  77   AST  --   --   --   --   --  10   ALT  --   --   --   --   --  17    < > = values in this interval not displayed.     CBC  Recent Labs   Lab 01/22/19  0522 01/21/19  0457 01/19/19  0855 01/17/19  2222   WBC 6.5 6.6 7.7 10.0   RBC 3.69* 3.81* 4.05* 4.11*   HGB 10.5* 10.9* 11.8* 11.8*   HCT 32.0* 33.2* 35.4* 36.3*   MCV 87 87 87 88   MCH 28.5 28.6 29.1 28.7   MCHC 32.8 32.8 33.3 32.5   RDW 12.8 12.8 13.0 13.1    282 303 292     INR  Recent Labs   Lab 01/20/19  0600 01/19/19  0544 01/18/19  0611   INR 1.43* 1.40* 1.28*     Echo 1/18/19:  Recent Results (from the past 4320 hour(s))   Echocardiogram Complete    Narrative    [id number]  [id number]  [id number]  [id number]^WILL^[facility]  [place]  Echocardiography Laboratory  [address] [place] [id number]     Name: PORTER YANCEY  MRN: [id number]  : 1950  Study Date: 2019 07:41 AM  Age: 68 yrs  Gender: Male  [place] [facility] [place]  Reason For Study: CHF  Ordering Physician: LUNA SOLIS  Referring Physician: LUNA SOLIS  Performed By: Ree Valdez     BSA: 2.5 m2  Height: 76 in  Weight: 254 lb  HR: 78  BP: 136/77 mmHg  _____________________________________________________________________________  __        Procedure  Complete Portable Echo Adult. Contrast Optison. Patient was given 6 ml mixture  of 3 ml Optison and 6 ml saline. 3 ml wasted.  _____________________________________________________________________________  __        Interpretation Summary  Moderately (EF 35-40%) reduced left ventricular function is present. Regional  wall abnormality involving the septum, apex and inferior walls. No LV apical  thrombus.  Global right ventricular function is normal. The right ventricle is normal  size.  The inferior vena cava was dilated at 2.6 cm without respiratory variability,  consistent with increased right atrial pressure.  Estimated mean right atrial pressure is 15 mmHg (significantly elevated).  No significant valvular dysfunction.  No pericardial effusion is present.  Rhythm was atrial fibrillation during the study.     This study was compared with the study from 2018 .  IVC doesnot collapse in this study. RA pressure is more elevated. RWA is  similar.     _____________________________________________________________________________  __        Left Ventricle  Left ventricular size is normal. Left ventricular wall thickness is normal.  Diastolic function not assessed due to atrial fibrillation. Moderately (EF 35-  40%) reduced left ventricular function is present. Mid anteroseptum akinetic.  Inferior wall hypokinesis is present. Apical wall hypokinesis is present.     Right Ventricle  Global right  ventricular function is normal. The right ventricle is normal  size.     Atria  Moderate left atrial enlargement is present. Moderate right atrial enlargement  is present. The atrial septum is intact as assessed by color Doppler .     Mitral Valve  The mitral valve is normal. Trace to mild mitral insufficiency is present.        Aortic Valve  Mild aortic valve sclerosis is present. The aortic valve is tricuspid. Trace  aortic insufficiency is present.     Tricuspid Valve  Trace to mild tricuspid insufficiency is present. Pulmonary artery systolic  pressure cannot be assessed.     Pulmonic Valve  The pulmonic valve is normal.     Vessels  The aorta root is normal. Estimated mean right atrial pressure is 15 mmHg  (significantly elevated). The inferior vena cava was dilated at 2.6 cm without  respiratory variability, consistent with increased right atrial pressure.     Pericardium  No pericardial effusion is present.        Compared to Previous Study  This study was compared with the study from 2018 . IVC doesnot collapse in  this study. RA pressure is more elevated. Otherwise direct comparison of the  images doesnot suggest significant change. RWA is similar.  _____________________________________________________________________________  __  MMode/2D Measurements & Calculations  IVSd: 0.97 cm     LVIDd: 5.5 cm  LVIDs: 4.2 cm  LVPWd: 1.0 cm  FS: 23.7 %  LV mass(C)d: 209.9 grams  LV mass(C)dI: 85.6 grams/m2  Ao root diam: 3.3 cm  asc Aorta Diam: 3.7 cm  LVOT diam: 2.3 cm  LVOT area: 4.2 cm2  LA Volume (BP): 116.0 ml  LA Volume Index (BP): 47.3 ml/m2  RWT: 0.37           Doppler Measurements & Calculations  MV E max john: 125.7 cm/sec  MV dec time: 0.14 sec  Ao V2 max: 179.0 cm/sec  Ao max P.0 mmHg  Ao V2 mean: 121.0 cm/sec  Ao mean P.0 mmHg  Ao V2 VTI: 34.6 cm  PRISCILA(I,D): 2.1 cm2  PRISCILA(V,D): 2.1 cm2  LV V1 max PG: 3.1 mmHg  LV V1 max: 88.7 cm/sec  LV V1 VTI: 17.7 cm  SV(LVOT): 73.5 ml  SI(LVOT): 30.0 ml/m2  PA  V2 max: 94.7 cm/sec  PA max PG: 3.6 mmHg  PA acc time: 0.07 sec  AV Yovani Ratio (DI): 0.50  PRISCILA Index (cm2/m2): 0.87  E/E' av.2  Lateral E/e': 17.8  Medial E/e': 22.6        _____________________________________________________________________________  __        Report approved by: Kmiberly CACERES 2019 09:38 AM                     ASSESSMENT/PLAN:  Jayro Elder is a 68 year old male with a history of type 2 diabetes complicated by prior diabetic foot infections, subacute left occipital CVA, coronary artery disease with stents, and associated ischemic cardiomyopathy with most recent echocardiogram completed 2018 showing an ejection fraction of 35-40% with indeterminate diastolic dysfunction, stress testing completed on 2018 showing a fixed large mid to distal anterior and anteroseptal defect, atrial fibrillation on chronic apixaban, untreated obstructive sleep apnea, recent admission with second toe osteomyelitis s/p toe amputation, who presented to ED with acute on chronic CHF exacerbtion.    Changes today:   - Continue diuresis  - Consult ortho to assess sutures/wound  - lightheaded at times. May need to hold AM imdur while diuresing. BP 100s/70s    # Acute on chronic CHF exacerbation 2/2 ABNER, recent surgeries and infection  # Hx CAD s/p multiple stents  # Hx ICM with EF 35-40%  Presented with orthopnea, 9 kg weight gain, BLE, and worsening ANDREWS over the past week in the setting of known ischemic cardiomyopathy with a most recent EF 35-40% in 2018. On admission, he was found to have elevated BNP 8,810 with normal troponins and an unchanged EKG. Acute exacerbation is secondary to ABNER from previous hospitalization (Cr peaked at 3.45 on ), thought to be 2/2 antibiotics he had received (i.e. Vancomycin). TTE from  showed dilated IVC and elevated RA pressures consistent with fluid overload; EF still at 35-40% and regional wall abnormalities involving the septum, apex, and inferior  walls that was also seen on echo from September.  - Strict I&Os  - Daily weights (goal wt <250lbs)   - Monitor K/Mg (q12 BMP)  - Lasix 40mg IV BID for goal net negative 2-3L    - Continue plavix 75 mg, imdur 30 mg, atorva 40 mg  - Switched metoprolol succ 50mg every day to Coreg 12.5mg BID for HTN in the setting of HFpEF on 1/19, increased to 25mg BID for HTN on 1/20  - Holding lisinopril 2/2 ABNER  - CORE following, pt to see new cardiologist in [place] and follow up with heart failure clinic when ready for discharge  - Lymphedema consulted     # Afib  [id number] score 5 (age, HTN, DM, stroke). Discovered in 2017 after patient suffered an left occipital stroke thought to be embolic;  Rates controlled; it seems his cardiologist had considered rhythm control in the past but this was never pursued  - Coreg as above   - Increase apixaban to 5mg BID      # ABNER on CKD  Baseline is 0.8-0.9. Had ABNER while previous admission for toe amputation. Peak was 3.45 on 1/13. Slowly improving. Could be multifactorial, secondary to cardiorenal, ABx use (he was at first on vancomycin), Hx of DM, and lisinopril. Renal US was normal. Cr stable-2.91 on admission, 2.94 today    - Diuresis as above  - Monitor, and avoid nephrotoxins  - Hold lisinopril  - Nephrology consulted, recs appreciated. Continue diuresis and avoid nephrotoxic agents.     # Recent osteomyelitis s/p rt second toe amputation   Had surgery on 1/6 and 1/8. Received vancomycin -> ceftriaxone while inpatient, discharged on Augmentin for 10 days, until 1/23. Needs podiatry follow up upon discharge.  - Continue Augmentin until 1/23  - ortho consult for wound check/sutures    # Type 2 DM  - Glargine to 15U every day (PTA Lantus was 10 U qday [pt was on 35 U BID previously, but dose decreased while previous admission at OSH])  - placed on high dose correction scale insulin, carb-based dosing, hypoglycemic protocol. Lantus increased to 20 units today.     # Hypertension  -  continue home amlodipine 10 mg, imdur 30 mg qday  - Switched metoprolol succ 50mg every day to Coreg 12.5mg BID for HTN in the setting of HFpEF on 1/19, increased to 25mg BID for HTN on 1/20   - hold lisinopril 20 mg  - prn Hydralazine for SBP > 160     # Hyperlipidemia:  Patient previously on Simvastatin 40 mg, will switch to high intensity statin in patient with known CAD  - Lipitor 40 mg daily    # Hypothyrodism:   -continue home levothyroxine 137 mcg     Code status: Full  FEN: Cardiac/Diabetic  DVT Prophylaxis: PTA apixaban  Dispo: discharge home in 2-3 days pending volume status    Patient discussed with Dr. Bell, who agrees with above plan.      Davion Perez PA-C  [facility] Cardiology Team

## 2019-01-22 NOTE — PLAN OF CARE
"BP (!) 159/106 (BP Location: Right arm)   Pulse 77   Temp 98  F (36.7  C) (Oral)   Resp 18   Ht 1.93 m (6' 4\")   Wt 111.5 kg (245 lb 12.8 oz)   SpO2 95%   BMI 29.92 kg/m      Alert and oriented x4. VSS on room air. Afib rates 70s-90s. Denies pain. On 2 gram Na diet with 2L fluid restriction. Fair appetite. Refused bedtime insulin and 0200 blood sugar check. No BM overnight. Voiding adequate amounts. Up independently. Ambulated in hallway x1. PIV saline locked. Dressing on right toe CDI. Plan for possible discharge to home later this week pending fluid status. Will continue to monitor and notify MDs accordingly.  "

## 2019-01-22 NOTE — PROGRESS NOTES
Nephrology Progress Note  01/22/2019         69 yo M with DM type 1, CAD, CHF, osteomyelitis s/p toe amputation and ABNER.    Interval History :   Nursing and provider notes from last 24 hours reviewed.  In the last 24 hours Jayro Elder has been dizzy with low BPs and is currently lying flat with feet up.  Continues to diurese with 3.8 L UOP in past 24 hours.      ASSESSMENT AND RECOMMENDATIONS:    1.  ABNER: Pt has previously normal renal function (Cr 0.8 on 1/6) and minimal proteinuria so this is not diabetic nephropathy.  Pt developed ABNER in the setting of osteomyelitis, CEI use and vanco.  Renal US on normal and UA is bland.  Expect resolution.  Pt has been on PPI for many years so this is unlikely to be the cause.  No contrasted studies in the past several months.  BP today is low and pt is dizzy                - monitor Cr                - avoid nephrotoxins                - would not restart CEI until Cr is back to <1.0     2. BP control:  BP is low today, likely as a result of volume removal.  Can decrease BP meds   - -discontinue amlodipine     3. Volume status: CHF symptoms have resolved with 6L fluid off.  Wt nearly back to baseline.  Home weight is 240, wt here is 245                 - agree with continued diuretics to maintain clear lungs               -approaching dry weight        4. Diabetes management: pt has poor glycemic control and atributes this to lack of coverage for insulin.  In addition, he apparently has not gotten recommended laser treatment of his retinopathy b/o high cost.  Pt is at risk of blindness                - ask SW to review his insurance and offer suggestions                    -  Recommendations were communicated to primary team via note        Shayla Norris MD   [phone]      IReview of Systems:   I reviewed the following systems:  GI: good appetite. no nausea or vomiting or diarrhea.   Neuro:  no confusion  Constitutional:  no fever or chills  CV: no dyspnea or  "edema.  no chest pain.    Physical Exam:   I/O last 3 completed shifts:  In: 1175 [P.O.:1160; I.V.:15]  Out: 3830 [Urine:3830]   /70 (BP Location: Right arm)   Pulse 70   Temp 98  F (36.7  C) (Oral)   Resp 16   Ht 1.93 m (6' 4\")   Wt 111.5 kg (245 lb 12.8 oz)   SpO2 96%   BMI 29.92 kg/m       GENERAL APPEARANCE: lying in bed with feet up b/o dizziness  EYES:  no scleral icterus, pupils equal  HENT: mouth without ulcers or lesions  PULM: lungs clear  CV: regular rhythm, normal rate, no rub     -JVD no     -edema wearing compression stockings  No edema in thighs   GI: soft, nontender, not distended  INTEGUMENT: no cyanosis, no rash  NEURO:  no asterixis       Labs:   All labs reviewed by me  Electrolytes/Renal -   Recent Labs   Lab Test 01/22/19  0522 01/21/19  1710 01/21/19  0457    137 135   POTASSIUM 3.9 3.8 3.4   CHLORIDE 97 100 98   CO2 28 29 27   BUN 35* 34* 33*   CR 3.02* 3.10* 2.98*   * 120* 199*   BETTIE 8.5 8.5 8.6   MAG 1.9 2.0 2.0       CBC -   Recent Labs   Lab Test 01/22/19  0522 01/21/19  0457 01/19/19  0855   WBC 6.5 6.6 7.7   HGB 10.5* 10.9* 11.8*    282 303       LFTs -   Recent Labs   Lab Test 01/17/19  1652 01/13/19  0702 01/11/19  0653   ALKPHOS 77 66 62   BILITOTAL 1.2 0.5 0.6   ALT 17 15 14   AST 10 10 9   PROTTOTAL 7.5 6.0* 6.0*   ALBUMIN 3.3* 2.7* 2.6*       Iron Panel - No lab results found.      Imaging: CXR 1/17: pulm edema      Current Medications:    amLODIPine  10 mg Oral Daily     amoxicillin-clavulanate  1 tablet Oral Q12H     apixaban ANTICOAGULANT  5 mg Oral BID     atorvastatin  40 mg Oral QPM     carvedilol  25 mg Oral BID w/meals     furosemide  40 mg Intravenous BID     insulin aspart   Subcutaneous QAM AC     insulin aspart   Subcutaneous Daily with lunch     insulin aspart   Subcutaneous Daily with supper     insulin aspart  1-10 Units Subcutaneous TID AC     insulin aspart  1-7 Units Subcutaneous At Bedtime     [START ON 1/23/2019] insulin " glargine  20 Units Subcutaneous QAM AC     isosorbide mononitrate  30 mg Oral Daily     levothyroxine  137 mcg Oral At Bedtime     pantoprazole  40 mg Oral QAM AC       - MEDICATION INSTRUCTIONS -       Shayla Norris MD

## 2019-01-22 NOTE — PLAN OF CARE
Discharge Planner OT   Patient plan for discharge: Pt would like to return home.   Current status: Pt supine inclined in bed upon arrival. Pt required CGA and vc's for transfer supine<->seated EOB and sit<->standing. Pt ambulated ~250ft x 2 with CGA/Min A and vc's with few self corrected instances of LOB. Pt completed 19 minutes of Nustep exercise with fair tolerance and rest breaks as needed. Pt tolerated session well. VSS.   Barriers to return to prior living situation: Decreased strength/activity tolerance, Fatigue.   Recommendations for discharge: Anticipate discharge home with A and OP CR.   Rationale for recommendations: Pt will benefit from continued therapy to address barriers above and to maximize functional independence.        Entered by: Mukul Donahue 01/22/2019 5:10 PM

## 2019-01-22 NOTE — TELEPHONE ENCOUNTER
CORE RN received a message that Mr. lEder is admitted at [facility] for HF and requests to continue follow up in the [place] location. CORE Clinic was recommended due to ICM EF 35-40%, volume overload on admit, and many issues with DM and osteomyelitis.  Patient previously followed with Dr. Mckeon for his cardiology care.  Scheduled patient for follow up with Dr. Tomlin for a new CORE MD appointment on 2/5/19 with BMP draw prior. Orders placed per staff recommendation.  Joshua RICE

## 2019-01-22 NOTE — PLAN OF CARE
Edema 5A: Recommend continued use of size F comperm compression stockings from MTPs to knee creases for further management of chronic LE edema. Compression stockings can be worn 23/24 hours per day and removed daily for skin cares - see patient care order for details. Patient with no further inpatient edema needs therefore will sign off.

## 2019-01-23 ENCOUNTER — APPOINTMENT (OUTPATIENT)
Dept: OCCUPATIONAL THERAPY | Facility: CLINIC | Age: 69
DRG: 292 | End: 2019-01-23
Payer: COMMERCIAL

## 2019-01-23 LAB
ANION GAP SERPL CALCULATED.3IONS-SCNC: 10 MMOL/L (ref 3–14)
ANION GAP SERPL CALCULATED.3IONS-SCNC: 10 MMOL/L (ref 3–14)
BUN SERPL-MCNC: 36 MG/DL (ref 7–30)
BUN SERPL-MCNC: 37 MG/DL (ref 7–30)
CALCIUM SERPL-MCNC: 8.9 MG/DL (ref 8.5–10.1)
CALCIUM SERPL-MCNC: 9 MG/DL (ref 8.5–10.1)
CHLORIDE SERPL-SCNC: 95 MMOL/L (ref 94–109)
CHLORIDE SERPL-SCNC: 97 MMOL/L (ref 94–109)
CO2 SERPL-SCNC: 28 MMOL/L (ref 20–32)
CO2 SERPL-SCNC: 29 MMOL/L (ref 20–32)
CREAT SERPL-MCNC: 3.03 MG/DL (ref 0.66–1.25)
CREAT SERPL-MCNC: 3.23 MG/DL (ref 0.66–1.25)
GFR SERPL CREATININE-BSD FRML MDRD: 19 ML/MIN/{1.73_M2}
GFR SERPL CREATININE-BSD FRML MDRD: 20 ML/MIN/{1.73_M2}
GLUCOSE BLDC GLUCOMTR-MCNC: 154 MG/DL (ref 70–99)
GLUCOSE BLDC GLUCOMTR-MCNC: 218 MG/DL (ref 70–99)
GLUCOSE BLDC GLUCOMTR-MCNC: 221 MG/DL (ref 70–99)
GLUCOSE SERPL-MCNC: 152 MG/DL (ref 70–99)
GLUCOSE SERPL-MCNC: 164 MG/DL (ref 70–99)
MAGNESIUM SERPL-MCNC: 1.9 MG/DL (ref 1.6–2.3)
MAGNESIUM SERPL-MCNC: 2 MG/DL (ref 1.6–2.3)
PLATELET # BLD AUTO: 292 10E9/L (ref 150–450)
POTASSIUM SERPL-SCNC: 3.5 MMOL/L (ref 3.4–5.3)
POTASSIUM SERPL-SCNC: 3.8 MMOL/L (ref 3.4–5.3)
SODIUM SERPL-SCNC: 134 MMOL/L (ref 133–144)
SODIUM SERPL-SCNC: 135 MMOL/L (ref 133–144)

## 2019-01-23 PROCEDURE — 00000146 ZZHCL STATISTIC GLUCOSE BY METER IP

## 2019-01-23 PROCEDURE — 25000128 H RX IP 250 OP 636: Performed by: HOSPITALIST

## 2019-01-23 PROCEDURE — 25000132 ZZH RX MED GY IP 250 OP 250 PS 637: Performed by: STUDENT IN AN ORGANIZED HEALTH CARE EDUCATION/TRAINING PROGRAM

## 2019-01-23 PROCEDURE — 25000128 H RX IP 250 OP 636: Performed by: STUDENT IN AN ORGANIZED HEALTH CARE EDUCATION/TRAINING PROGRAM

## 2019-01-23 PROCEDURE — 21400000 ZZH R&B CCU UMMC

## 2019-01-23 PROCEDURE — 85049 AUTOMATED PLATELET COUNT: CPT | Performed by: STUDENT IN AN ORGANIZED HEALTH CARE EDUCATION/TRAINING PROGRAM

## 2019-01-23 PROCEDURE — 83735 ASSAY OF MAGNESIUM: CPT | Performed by: HOSPITALIST

## 2019-01-23 PROCEDURE — 40000133 ZZH STATISTIC OT WARD VISIT

## 2019-01-23 PROCEDURE — 36415 COLL VENOUS BLD VENIPUNCTURE: CPT | Performed by: HOSPITALIST

## 2019-01-23 PROCEDURE — 97110 THERAPEUTIC EXERCISES: CPT | Mod: GO

## 2019-01-23 PROCEDURE — 25000132 ZZH RX MED GY IP 250 OP 250 PS 637: Performed by: HOSPITALIST

## 2019-01-23 PROCEDURE — 25000132 ZZH RX MED GY IP 250 OP 250 PS 637: Performed by: NURSE PRACTITIONER

## 2019-01-23 PROCEDURE — 80048 BASIC METABOLIC PNL TOTAL CA: CPT | Performed by: HOSPITALIST

## 2019-01-23 RX ORDER — AMLODIPINE BESYLATE 10 MG/1
10 TABLET ORAL DAILY
Status: DISCONTINUED | OUTPATIENT
Start: 2019-01-23 | End: 2019-01-24

## 2019-01-23 RX ORDER — FUROSEMIDE 10 MG/ML
40 INJECTION INTRAMUSCULAR; INTRAVENOUS ONCE
Status: COMPLETED | OUTPATIENT
Start: 2019-01-23 | End: 2019-01-23

## 2019-01-23 RX ORDER — POTASSIUM CHLORIDE 750 MG/1
20 TABLET, EXTENDED RELEASE ORAL ONCE
Status: COMPLETED | OUTPATIENT
Start: 2019-01-23 | End: 2019-01-23

## 2019-01-23 RX ORDER — MAGNESIUM SULFATE 1 G/100ML
1 INJECTION INTRAVENOUS ONCE
Status: COMPLETED | OUTPATIENT
Start: 2019-01-23 | End: 2019-01-23

## 2019-01-23 RX ORDER — FUROSEMIDE 10 MG/ML
60 INJECTION INTRAMUSCULAR; INTRAVENOUS 2 TIMES DAILY
Status: DISCONTINUED | OUTPATIENT
Start: 2019-01-23 | End: 2019-01-24

## 2019-01-23 RX ORDER — POTASSIUM CHLORIDE 750 MG/1
40 TABLET, EXTENDED RELEASE ORAL ONCE
Status: COMPLETED | OUTPATIENT
Start: 2019-01-23 | End: 2019-01-23

## 2019-01-23 RX ADMIN — CARVEDILOL 25 MG: 25 TABLET, FILM COATED ORAL at 18:17

## 2019-01-23 RX ADMIN — FUROSEMIDE 40 MG: 10 INJECTION, SOLUTION INTRAVENOUS at 10:52

## 2019-01-23 RX ADMIN — APIXABAN 5 MG: 5 TABLET, FILM COATED ORAL at 19:44

## 2019-01-23 RX ADMIN — POTASSIUM CHLORIDE 40 MEQ: 750 TABLET, EXTENDED RELEASE ORAL at 10:51

## 2019-01-23 RX ADMIN — AMOXICILLIN AND CLAVULANATE POTASSIUM 1 TABLET: 875; 125 TABLET, FILM COATED ORAL at 10:51

## 2019-01-23 RX ADMIN — APIXABAN 5 MG: 5 TABLET, FILM COATED ORAL at 07:55

## 2019-01-23 RX ADMIN — AMOXICILLIN AND CLAVULANATE POTASSIUM 1 TABLET: 875; 125 TABLET, FILM COATED ORAL at 22:54

## 2019-01-23 RX ADMIN — ATORVASTATIN CALCIUM 40 MG: 40 TABLET, FILM COATED ORAL at 19:44

## 2019-01-23 RX ADMIN — ISOSORBIDE MONONITRATE 30 MG: 30 TABLET, EXTENDED RELEASE ORAL at 07:55

## 2019-01-23 RX ADMIN — FUROSEMIDE 40 MG: 10 INJECTION, SOLUTION INTRAVENOUS at 07:55

## 2019-01-23 RX ADMIN — CARVEDILOL 25 MG: 25 TABLET, FILM COATED ORAL at 07:55

## 2019-01-23 RX ADMIN — PANTOPRAZOLE SODIUM 40 MG: 40 TABLET, DELAYED RELEASE ORAL at 07:55

## 2019-01-23 RX ADMIN — MAGNESIUM SULFATE IN DEXTROSE 1 G: 10 INJECTION, SOLUTION INTRAVENOUS at 10:49

## 2019-01-23 RX ADMIN — FUROSEMIDE 60 MG: 10 INJECTION, SOLUTION INTRAVENOUS at 14:24

## 2019-01-23 RX ADMIN — POTASSIUM CHLORIDE 20 MEQ: 750 TABLET, EXTENDED RELEASE ORAL at 19:44

## 2019-01-23 RX ADMIN — LEVOTHYROXINE SODIUM 137 MCG: 25 TABLET ORAL at 22:54

## 2019-01-23 RX ADMIN — AMLODIPINE BESYLATE 10 MG: 10 TABLET ORAL at 10:51

## 2019-01-23 ASSESSMENT — ACTIVITIES OF DAILY LIVING (ADL)
ADLS_ACUITY_SCORE: 16
ADLS_ACUITY_SCORE: 17
ADLS_ACUITY_SCORE: 16

## 2019-01-23 ASSESSMENT — MIFFLIN-ST. JEOR: SCORE: 1974.19

## 2019-01-23 NOTE — PLAN OF CARE
"/66 (BP Location: Right arm)   Pulse 72   Temp 98  F (36.7  C) (Oral)   Resp 16   Ht 1.93 m (6' 4\")   Wt 110.3 kg (243 lb 1.6 oz)   SpO2 98%   BMI 29.59 kg/m      Alert and oriented x4. VSS. Afib. Sats 90s on room air. Denies pain. On 2 gram Na diet with 2L fluid restriction. No BM overnight. Voiding adequate amounts. PIV saline locked. Up independently. Pt appeared to sleep between cares. Plan for possible discharge to home this week pending fluid status. Will continue to monitor and notify MDs accordingly.  "

## 2019-01-23 NOTE — PROGRESS NOTES
[facility]   Cardiology   Progress Note     Interval History:  - NAEO   - Net negative 2.4L yesterday. Continues to respond well to lasix. Will monitor today  - Patient feels legs are almost back to normal size, denied lightheadedness; denied CP or SOB     Physical Exam:  Temp:  [97.8  F (36.6  C)-98.3  F (36.8  C)] 98.1  F (36.7  C)  Pulse:  [70-72] 72  Heart Rate:  [69-82] 81  Resp:  [16] 16  BP: (108-151)/(66-91) 151/91  SpO2:  [93 %-98 %] 93 %    Wt:   Wt Readings from Last 5 Encounters:   01/23/19 110.3 kg (243 lb 1.6 oz)   01/14/19 119.3 kg (263 lb)   09/06/18 115.1 kg (253 lb 11.2 oz)   03/12/18 114.3 kg (252 lb)   03/03/18 109.6 kg (241 lb 9.6 oz)       General: NAD, on RA, resting comfortably   CV: Irregular rhythm. Regular rate. No murmur appreciated. No rubs or gallops. Peripheral radial pulse intact.  Resp: No increased work of breathing or use of accessory muscles, breathing comfortably on room air.  Lung sounds clear throughout/bilaterally  Abdomen: No obvious scars or hernias. Normal active bowel sounds.  Abdomen is soft. No distension, non-tender to palpation. No rebound tenderness or guarding. percussion WNL   Extremities: warm, legs wrapped, 1+ LE edema up to the knees, improved from yesterday  Skin:  Warm and dry. No erythema, rashes, ulceration or diaphoresis.  Neuro: Alert and oriented x3.  Moving all extremities equally    Medications:    amoxicillin-clavulanate  1 tablet Oral Q12H     apixaban ANTICOAGULANT  5 mg Oral BID     atorvastatin  40 mg Oral QPM     carvedilol  25 mg Oral BID w/meals     furosemide  40 mg Intravenous BID     insulin aspart   Subcutaneous QAM AC     insulin aspart   Subcutaneous Daily with lunch     insulin aspart   Subcutaneous Daily with supper     insulin aspart  1-10 Units Subcutaneous TID AC     insulin aspart  1-7 Units Subcutaneous At Bedtime     insulin glargine  20 Units Subcutaneous QAM AC     isosorbide mononitrate  30 mg Oral  Daily     levothyroxine  137 mcg Oral At Bedtime     magnesium sulfate  1 g Intravenous Once     pantoprazole  40 mg Oral QAM AC     potassium chloride  40 mEq Oral Once       - MEDICATION INSTRUCTIONS -         Labs:   CMP  Recent Labs   Lab 19  0536 19  1852 19  0522 19  1710  19  1652    133 134 137   < > 136   POTASSIUM 3.5 3.7 3.9 3.8   < > 3.7   CHLORIDE 97 96 97 100   < > 103   CO2 28 28 28 29   < > 21   ANIONGAP 10 9 9 8   < > 12   * 230* 188* 120*   < > 317*   BUN 36* 38* 35* 34*   < > 25   CR 3.03* 3.16* 3.02* 3.10*   < > 2.91*   GFRESTIMATED 20* 19* 20* 20*   < > 21*   GFRESTBLACK 23* 22* 23* 23*   < > 24*   BETTIE 9.0 8.3* 8.5 8.5   < > 8.4*   MAG 1.9 1.7 1.9 2.0   < > 2.0   PROTTOTAL  --   --   --   --   --  7.5   ALBUMIN  --   --   --   --   --  3.3*   BILITOTAL  --   --   --   --   --  1.2   ALKPHOS  --   --   --   --   --  77   AST  --   --   --   --   --  10   ALT  --   --   --   --   --  17    < > = values in this interval not displayed.     CBC  Recent Labs   Lab 19  0536 19  0522 19  0457 19  0855 19  2222   WBC  --  6.5 6.6 7.7 10.0   RBC  --  3.69* 3.81* 4.05* 4.11*   HGB  --  10.5* 10.9* 11.8* 11.8*   HCT  --  32.0* 33.2* 35.4* 36.3*   MCV  --  87 87 87 88   MCH  --  28.5 28.6 29.1 28.7   MCHC  --  32.8 32.8 33.3 32.5   RDW  --  12.8 12.8 13.0 13.1    260 282 303 292     INR  Recent Labs   Lab 19  0600 19  0544 19  0611   INR 1.43* 1.40* 1.28*     Echo 19:  Recent Results (from the past 4320 hour(s))   Echocardiogram Complete    Narrative    [id number]  [id number]  [id number]  [id number]^ONSARAH^LUNA           [facility],[place]  Echocardiography Laboratory  [address] [place] [id number]     Name: PORTER YANCEY  MRN: [id number]  : 1950  Study Date: 2019 07:41 AM  Age: 68 yrs  Gender: Male  [place] [facility] [place]  Reason For Study: CHF  Ordering  Physician: LUNA SOLIS  Referring Physician: LUNA SOLIS  Performed By: Ree Rogerseddiemelina     BSA: 2.5 m2  Height: 76 in  Weight: 254 lb  HR: 78  BP: 136/77 mmHg  _____________________________________________________________________________  __        Procedure  Complete Portable Echo Adult. Contrast Optison. Patient was given 6 ml mixture  of 3 ml Optison and 6 ml saline. 3 ml wasted.  _____________________________________________________________________________  __        Interpretation Summary  Moderately (EF 35-40%) reduced left ventricular function is present. Regional  wall abnormality involving the septum, apex and inferior walls. No LV apical  thrombus.  Global right ventricular function is normal. The right ventricle is normal  size.  The inferior vena cava was dilated at 2.6 cm without respiratory variability,  consistent with increased right atrial pressure.  Estimated mean right atrial pressure is 15 mmHg (significantly elevated).  No significant valvular dysfunction.  No pericardial effusion is present.  Rhythm was atrial fibrillation during the study.     This study was compared with the study from 9/8/2018 .  IVC doesnot collapse in this study. RA pressure is more elevated. RWA is  similar.     _____________________________________________________________________________  __        Left Ventricle  Left ventricular size is normal. Left ventricular wall thickness is normal.  Diastolic function not assessed due to atrial fibrillation. Moderately (EF 35-  40%) reduced left ventricular function is present. Mid anteroseptum akinetic.  Inferior wall hypokinesis is present. Apical wall hypokinesis is present.     Right Ventricle  Global right ventricular function is normal. The right ventricle is normal  size.     Atria  Moderate left atrial enlargement is present. Moderate right atrial enlargement  is present. The atrial septum is intact as assessed by color Doppler .     Mitral Valve  The mitral valve  is normal. Trace to mild mitral insufficiency is present.        Aortic Valve  Mild aortic valve sclerosis is present. The aortic valve is tricuspid. Trace  aortic insufficiency is present.     Tricuspid Valve  Trace to mild tricuspid insufficiency is present. Pulmonary artery systolic  pressure cannot be assessed.     Pulmonic Valve  The pulmonic valve is normal.     Vessels  The aorta root is normal. Estimated mean right atrial pressure is 15 mmHg  (significantly elevated). The inferior vena cava was dilated at 2.6 cm without  respiratory variability, consistent with increased right atrial pressure.     Pericardium  No pericardial effusion is present.        Compared to Previous Study  This study was compared with the study from 2018 . IVC doesnot collapse in  this study. RA pressure is more elevated. Otherwise direct comparison of the  images doesnot suggest significant change. RWA is similar.  _____________________________________________________________________________  __  MMode/2D Measurements & Calculations  IVSd: 0.97 cm     LVIDd: 5.5 cm  LVIDs: 4.2 cm  LVPWd: 1.0 cm  FS: 23.7 %  LV mass(C)d: 209.9 grams  LV mass(C)dI: 85.6 grams/m2  Ao root diam: 3.3 cm  asc Aorta Diam: 3.7 cm  LVOT diam: 2.3 cm  LVOT area: 4.2 cm2  LA Volume (BP): 116.0 ml  LA Volume Index (BP): 47.3 ml/m2  RWT: 0.37           Doppler Measurements & Calculations  MV E max yovani: 125.7 cm/sec  MV dec time: 0.14 sec  Ao V2 max: 179.0 cm/sec  Ao max P.0 mmHg  Ao V2 mean: 121.0 cm/sec  Ao mean P.0 mmHg  Ao V2 VTI: 34.6 cm  PRISCILA(I,D): 2.1 cm2  PRISCILA(V,D): 2.1 cm2  LV V1 max PG: 3.1 mmHg  LV V1 max: 88.7 cm/sec  LV V1 VTI: 17.7 cm  SV(LVOT): 73.5 ml  SI(LVOT): 30.0 ml/m2  PA V2 max: 94.7 cm/sec  PA max PG: 3.6 mmHg  PA acc time: 0.07 sec  AV Yovani Ratio (DI): 0.50  PRISCILA Index (cm2/m2): 0.87  E/E' av.2  Lateral E/e': 17.8  Medial E/e': 22.6        _____________________________________________________________________________  __         Report approved by: Kimberly CACERES 01/18/2019 09:38 AM                     ASSESSMENT/PLAN:  Jayro Elder is a 68 year old male with a history of type 2 diabetes complicated by prior diabetic foot infections, subacute left occipital CVA, coronary artery disease with stents, and associated ischemic cardiomyopathy with most recent echocardiogram completed 9/2018 showing an ejection fraction of 35-40% with indeterminate diastolic dysfunction, stress testing completed on 9/14/2018 showing a fixed large mid to distal anterior and anteroseptal defect, atrial fibrillation on chronic apixaban, untreated obstructive sleep apnea, recent admission with second toe osteomyelitis s/p toe amputation, who presented to ED with acute on chronic CHF exacerbtion.    Changes today:   - Increase Lasix to 60mg IV BID, planning on switching to PO tomorrow and likely discharge home Friday     # Acute on chronic systolic CHF exacerbation 2/2 ABNER, recent surgeries and infection  # Hx CAD s/p multiple stents  # Hx ICM with EF 35-40%  Presented with orthopnea, 9 kg weight gain, BLE, and worsening ANDREWS over the past week in the setting of known ischemic cardiomyopathy with a most recent EF 35-40% in September 2018. On admission, he was found to have elevated BNP 8,810 with normal troponins and an unchanged EKG. Acute exacerbation is secondary to ABNER from previous hospitalization (Cr peaked at 3.45 on 1/13), thought to be 2/2 antibiotics he had received (i.e. Vancomycin). TTE from 1/18 showed dilated IVC and elevated RA pressures consistent with fluid overload; EF still at 35-40% and regional wall abnormalities involving the septum, apex, and inferior walls that was also seen on echo from September.  - Strict I&Os  - Daily weights (goal wt <250lbs)   - Monitor K/Mg (q12 BMP)  - Increase Lasix to 60mg IV BID   - Continue plavix 75 mg, imdur 30 mg, atorva 40 mg  - Switched metoprolol succ 50mg every day to Coreg 12.5mg BID for HTN in the  setting of HFpEF on 1/19, increased to 25mg BID for HTN on 1/20  - Holding lisinopril 2/2 ABNER; per renal, resume when Cr <1.0  - CORE following, pt to see new cardiologist in [place] and follow up with heart failure clinic when ready for discharge  - Lymphedema consulted     # Afib  [id number] score 5 (age, HTN, DM, stroke). Discovered in 2017 after patient suffered an left occipital stroke thought to be embolic;  Rates controlled; it seems his cardiologist had considered rhythm control in the past but this was never pursued  - Coreg as above   - Apixaban to 5mg BID      # ABNER on CKD  Baseline is 0.8-0.9. Had ABNER while previous admission for toe amputation. Peak was 3.45 on 1/13. Slowly improving. Could be multifactorial, secondary to cardiorenal, ABx use (he was at first on vancomycin), Hx of DM, and lisinopril. Renal US was normal. Cr stable-2.91 on admission, 2.94 today    - Diuresis as above  - Monitor, and avoid nephrotoxins  - Holding lisinopril 2/2 ABNER; per renal, resume when Cr <1.0   - Nephrology consulted, recs appreciated. Continue diuresis and avoid nephrotoxic agents.     # Recent osteomyelitis s/p rt second toe amputation   Had surgery on 1/6 and 1/8. Received vancomycin -> ceftriaxone while inpatient, discharged on Augmentin for 10 days, until 1/23. Needs podiatry follow up upon discharge. Ortho saw patient on 1/22 and removed a suture   - Continue Augmentin until 1/23    # Type 2 DM  - Glargine to 23U every day (PTA Lantus was 10 U qday [pt was on 35 U BID previously, but dose decreased while previous admission at OSH])  - placed on high dose correction scale insulin, carb-based dosing, hypoglycemic protocol.      # Hypertension  - continue home amlodipine 10 mg, imdur 30 mg qday  - Switched metoprolol succ 50mg every day to Coreg 12.5mg BID for HTN in the setting of HFpEF on 1/19, increased to 25mg BID for HTN on 1/20   - hold lisinopril 20 mg  - prn Hydralazine for SBP > 160     #  Hyperlipidemia:  Patient previously on Simvastatin 40 mg, will switch to high intensity statin in patient with known CAD  - Lipitor 40 mg daily    # Hypothyrodism:   -continue home levothyroxine 137 mcg     Code status: Full  FEN: Cardiac/Diabetic  DVT Prophylaxis: PTA apixaban  Dispo: discharge home on Fridat     Patient discussed with Dr. Bell, who agrees with above plan.    Corina Dumont M.D.   PGY2 Internal Medicine   [phone]

## 2019-01-23 NOTE — PLAN OF CARE
Alert and oriented x4. VSS. Afib. Sats 90s on room air. Denies pain. On 2 gram Na diet with 2L fluid restriction. Appetite good. Voiding good amounts. PIV saline locked. Up independently to bathroom. Ambulated in nevarez with SBA.  Blood glucose checks with sliding scale insulin and carb coverage. Plan for possible discharge to home this week pending fluid status. Will continue to monitor and notify MDs accordingly.

## 2019-01-23 NOTE — DISCHARGE SUMMARY
[facility], [place]  Discharge Summary - Cardiology 1       Date of Admission:  1/17/2019  Date of Discharge:  1/25/2019  6:00 PM  Discharging Provider: Dr. Bell  Discharge Service: Cardiology 1    Discharge Diagnoses   - Acute on chronic CHF exacerbation 2/2 ABNER, recent surgeries and infection  - Hx CAD s/p multiple stents  - Hx ICM with EF 35-40%  - HTN   - pAfib  - ABNER   - Recent osteomyelitis s/p rt second toe amputation   - Type 2 DM    Follow-ups Needed After Discharge   - CORE clinic apt on 2/5  - Nephrology referral placed, patient to see renal w/in 1mo    Hospital Course   Jayro Elder is a 68 year old male with a history of type 2 diabetes complicated by prior diabetic foot infections, subacute left occipital CVA, coronary artery disease with stents, and associated ischemic cardiomyopathy with most recent echocardiogram completed 9/2018 showing an ejection fraction of 35-40% with indeterminate diastolic dysfunction, stress testing completed on 9/14/2018 showing a fixed large mid to distal anterior and anteroseptal defect, atrial fibrillation on chronic apixaban, untreated obstructive sleep apnea, recent admission with second toe osteomyelitis s/p toe amputation, who presented to ED with acute on chronic CHF exacerbtion.     # Acute on chronic CHF exacerbation 2/2 ABNER, recent surgeries and infection  # Hx CAD s/p multiple stents  # Hx ICM with EF 35-40%  # HTN   Presented with orthopnea, 9 kg weight gain, BLE, and worsening ANDREWS over the past week in the setting of known ischemic cardiomyopathy with a most recent EF 35-40% in September 2018. On admission, he was found to have elevated BNP 8,810 with normal troponins and an unchanged EKG. Acute exacerbation is secondary to ABNER from previous hospitalization (Cr peaked at 3.45 on 1/13), thought to be 2/2 antibiotics he had received (i.e. Vancomycin). TTE from 1/18 showed dilated IVC and elevated RA pressures consistent with fluid  overload; EF still at 35-40% and regional wall abnormalities involving the septum, apex, and inferior walls that was also seen on echo from September. Patient received IV diuresis and weight decreased from 268lb on admission to 238 lb on discharge (dry weight unknown, patient not on diuretics PTA) and his LE edema had significantly improved.   - Lasix 80mg BID   - Imdur 30 mg, atorva 40 mg  - Holding Plavix given that he has an ABNER and is on warfarin (increased risk of bleeding); outpatient cardiologist can determine when to resume  - Switched metoprolol succ 50mg every day to Coreg  25mg BID for HTN   - Holding lisinopril 2/2 ABNER; per renal, resume when Cr <1.0  - Patient instructed to call the CORE clinic if his weight changes by more than 3lbs in 1 day   - CORE clinic apt on 2/5     #pAfib  [id number] score 5 (age, HTN, DM, stroke). Discovered in 2017 after patient suffered an left occipital stroke thought to be embolic; Rates controlled; it seems his cardiologist had considered rhythm control in the past but this was never pursue # Afibd  - Coreg as above   - Apixaban to 5mg BID       # ABNER   Baseline is 0.8-0.9. Had ABNER while previous admission for toe amputation. Peak was 3.45 on 1/13. Slowly improving. Could be multifactorial, secondary to cardiorenal, ABx use (he was at first on vancomycin), Hx of DM, and lisinopril. Renal US was normal. Cr stable-2.91 on admission, 3.02 on discharge; renal was consulted this admission   - Holding lisinopril 2/2 ABNER; per renal, resume when Cr <1.0   - Nephrology referral placed, patient to see renal w/in 1mo    # Recent osteomyelitis s/p rt second toe amputation   Had surgery on 1/6 and 1/8. Received vancomycin -> ceftriaxone while inpatient, discharged on Augmentin for 10 days, until 1/23. Needs podiatry follow up upon discharge. Ortho saw patient on 1/22 and removed a suture   - Augmentin finished on 1/23     # Type 2 DM  - Discharged on glargine to 26U every day (PTA  Lantus was 10 U qday [pt was on 35 U BID + Aspart 8U TID prior to most recent admission for osteo], but dose decreased during previous admission at OSH]) and Aspart 8U TID prior to meals   - Follows w/ an endocrinologist outside our system    Consultations This Hospital Stay   MEDICATION HISTORY IP PHARMACY CONSULT  CARDIAC REHAB IP CONSULT  NUTRITION SERVICES ADULT IP CONSULT  WOUND OSTOMY CONTINENCE NURSE  IP CONSULT  LYMPHEDEMA THERAPY IP CONSULT  LYMPHEDEMA THERAPY IP CONSULT  NEPHROLOGY GENERAL ADULT IP CONSULT  SOCIAL WORK IP CONSULT  ORTHOPAEDIC SURGERY ADULT/PEDS IP CONSULT  SMOKING CESSATION PROGRAM IP CONSULT    Code Status   Full Code     The patient was discussed with Dr. Ray Dumont MD  Cardiology 1 Service  [facility], [place]  Pager: [phone]  ______________________________________________________________________    Physical Exam   Vital Signs: Temp: 98  F (36.7  C) Temp src: Oral BP: 104/65 Pulse: 72 Heart Rate: 83 Resp: 16 SpO2: 97 % O2 Device: None (Room air)    Weight: 243 lbs 1.6 oz  General: NAD, on RA, sitting on the side of the bed eating breakfast   CV: Irregular rhythm. Regular rate. Irregular rhythm, No murmur appreciated. No rubs or gallops. Peripheral radial pulse intact.  Resp: No increased work of breathing or use of accessory muscles, breathing comfortably on room air.  Lung sounds clear throughout/bilaterally  Abdomen: No obvious scars or hernias. Normal active bowel sounds.  Abdomen is soft. No distension, non-tender to palpation. No rebound tenderness or guarding. percussion WNL   Extremities: warm, legs wrapped, trace LE edema bl   Neuro: Alert and oriented x3.        Primary Care Physician   Davion Cordova    Discharge Disposition   Discharged to home  Condition at discharge: Stable    Significant Results and Procedures   Results for orders placed or performed during the hospital encounter of 01/17/19   XR Chest 2 Views    Narrative     Exam:  Chest X-ray 1/17/2019 4:59 PM    History: orthopnea, heart failure suspected    Comparison: 9/6/2018    Findings: PA and lateral views of the chest. Stable cardiac  mediastinal silhouette. No pleural effusion. Mild pulmonary vascular  congestion. Mildly increased interstitial markings. Patchy  retrocardiac opacities. No pneumothorax. The visualized upper abdomen  is unremarkable. Old healed left-sided rib fractures.      Impression    Impression:   1. Pulmonary vascular congestion with increased interstitial markings,  likely mild pulmonary edema.  2. Retrocardiac opacity, the differential includes atelectasis,  consolidation, or pulmonary edema.    I have personally reviewed the examination and initial interpretation  and I agree with the findings.    MARTHA USTTON MD       Discharge Orders     Discharge Medications   Current Discharge Medication List      CONTINUE these medications which have NOT CHANGED    Details   amLODIPine (NORVASC) 5 MG tablet Take 2 tablets (10 mg) by mouth daily  Qty: 60 tablet, Refills: 0    Associated Diagnoses: Coronary artery disease involving native coronary artery of native heart with angina pectoris (H)      amoxicillin-clavulanate (AUGMENTIN) 875-125 MG tablet Take 1 tablet by mouth daily for 10 days  Qty: 10 tablet, Refills: 0    Associated Diagnoses: Cellulitis of right lower leg      apixaban ANTICOAGULANT (ELIQUIS) 5 MG tablet Take 1 tablet (5 mg) by mouth 2 times daily HOLD this medication until instructed to resume by your primary MD  Qty: 60 tablet, Refills: 0    Associated Diagnoses: Chronic atrial fibrillation (H)      Cholecalciferol (VITAMIN D3) 2000 units TABS Take 1 tablet by mouth daily      clopidogrel (PLAVIX) 75 MG tablet Take 1 tablet (75 mg) by mouth daily  Qty: 90 tablet, Refills: 2    Associated Diagnoses: Coronary artery disease involving native coronary artery of native heart with other form of angina pectoris (H)      insulin aspart (NOVOLOG  FLEXPEN) 100 UNIT/ML pen Inject Subcutaneous 3 times daily (with meals) USes about 7-10 units with meal depending      insulin glargine U-300 (TOUJEO) 300 UNIT/ML injection Inject 35 units in the morning and 35 units in the evening.      insulin pen needle 31G X 6 MM Use  as directed.  Qty: 100 each, Refills: prn    Associated Diagnoses: Type 2 diabetes mellitus with diabetic peripheral angiopathy without gangrene, unspecified long term insulin use status      isosorbide mononitrate (IMDUR) 30 MG 24 hr tablet Take 1 tablet (30 mg) by mouth daily  Qty: 90 tablet, Refills: 0    Associated Diagnoses: Hypertension goal BP (blood pressure) < 140/90      levothyroxine (SYNTHROID, LEVOTHROID) 137 MCG tablet Take 137 mcg by mouth At Bedtime   Qty: 90 tablet, Refills: 3      lisinopril (PRINIVIL/ZESTRIL) 20 MG tablet Take 1 tablet (20 mg) by mouth 2 times daily HOLD this medication until instructed to resume per primary MD  Qty: 60 tablet, Refills: 0    Associated Diagnoses: Benign essential hypertension      metoprolol succinate (TOPROL-XL) 100 MG 24 hr tablet Take 0.5 tablets (50 mg) by mouth At Bedtime  Qty: 90 tablet, Refills: 3    Associated Diagnoses: Coronary artery disease involving native coronary artery of native heart without angina pectoris      pantoprazole (PROTONIX) 40 MG enteric coated tablet Take 1 tablet (40 mg) by mouth every morning (before breakfast)  Qty: 30 tablet, Refills: 0    Associated Diagnoses: GERD (gastroesophageal reflux disease)      simvastatin (ZOCOR) 40 MG tablet Take 1 tablet (40 mg) by mouth At Bedtime  Qty: 90 tablet, Refills: 3    Associated Diagnoses: Coronary artery disease involving native coronary artery of native heart without angina pectoris      nitroglycerin (NITROSTAT) 0.4 MG sublingual tablet Place 1 tablet (0.4 mg) under the tongue every 5 minutes as needed for chest pain  Qty: 50 tablet, Refills: 0    Associated Diagnoses: Other chest pain      !! order for DME Equipment  being ordered: Other: surgical shoe male XL  Treatment Diagnosis: sp right 2nd toe amputaion  Qty: 1 Device, Refills: 0    Associated Diagnoses: S/P amputation of lesser toe, right (H)      !! order for DME Equipment being ordered: Walker Wheels () and Walker ()  Treatment Diagnosis: Impaired gait, heel WB bilaterally.  Qty: 1 each, Refills: 0    Associated Diagnoses: Diabetic ulcer of left midfoot associated with diabetes mellitus of other type, unspecified ulcer stage (H)       !! - Potential duplicate medications found. Please discuss with provider.        Allergies   Allergies   Allergen Reactions     No Known Allergies

## 2019-01-23 NOTE — PLAN OF CARE
D:  Admitted 1/17 for CHF exacerbation  I/A:  VSS on room air, afib on monitor.  R toe dressing CDI  P:  Continue w/ POC, notify team w/ changes/concerns    Hours of care [id number]

## 2019-01-23 NOTE — PROGRESS NOTES
Pt has stable Cr. Please see recs from yesterday's note. Consider stopping amlodipine b/o dizziness.  Renal service will sign off.  Thanks    Shayla Norris MD

## 2019-01-23 NOTE — CONSULTS
Social Work Services Consult Note:     SW consulted for financial/insurance issues with coverage of insulin medication.  SW handed off consult to RNCC.  Please see RNCC notes for updates.  Please re-consult if other needs arise.    RAQUEL Pelayo, APSW  [address]   Phone: [phone]  Pager: [phone]  Unit: [phone]

## 2019-01-23 NOTE — PLAN OF CARE
Fluid Imbalance (Heart Failure)  Fluid Balance  1/22/2019 1811 - Improving by Alis Curran, RN  D: CHF exacerbation and Fluid overload     I: Monitored vitals and assessed pt status.   Changed: No Change  Running: PIV SL  PRN: Nothing given     A: A0x4. VSS. Afebrile. Urinating adequately. Pt stated he feels his legs are getting smaller, right more than left. When bending over to put on shoes for a walk he felt light headed and nauseous, relieved with rest. While out for walk on own, pt stumbled slightly. RN student escorted pt back to room. Will call for assistance with walks around unit. Tele: Afib, Pain: slight discomfort in ankles. No intervention per pt request. LSS.   Stiches removed from amputated toe today by Ortho. Dressed with dry gauze, no drainage, CDI.        P: Continue to monitor Pt status and report changes to Cards 1.     Temp:  [97.7  F (36.5  C)-98.5  F (36.9  C)] 98  F (36.7  C)  Pulse:  [70] 70  Heart Rate:  [73-85] 76  Resp:  [16-18] 16  BP: (108-159)/() 108/70  SpO2:  [94 %-98 %] 96 %

## 2019-01-24 ENCOUNTER — APPOINTMENT (OUTPATIENT)
Dept: OCCUPATIONAL THERAPY | Facility: CLINIC | Age: 69
DRG: 292 | End: 2019-01-24
Payer: COMMERCIAL

## 2019-01-24 LAB
ANION GAP SERPL CALCULATED.3IONS-SCNC: 13 MMOL/L (ref 3–14)
ANION GAP SERPL CALCULATED.3IONS-SCNC: 8 MMOL/L (ref 3–14)
BUN SERPL-MCNC: 39 MG/DL (ref 7–30)
BUN SERPL-MCNC: 42 MG/DL (ref 7–30)
CALCIUM SERPL-MCNC: 8.4 MG/DL (ref 8.5–10.1)
CALCIUM SERPL-MCNC: 8.9 MG/DL (ref 8.5–10.1)
CHLORIDE SERPL-SCNC: 96 MMOL/L (ref 94–109)
CHLORIDE SERPL-SCNC: 96 MMOL/L (ref 94–109)
CO2 SERPL-SCNC: 28 MMOL/L (ref 20–32)
CO2 SERPL-SCNC: 29 MMOL/L (ref 20–32)
CREAT SERPL-MCNC: 3.01 MG/DL (ref 0.66–1.25)
CREAT SERPL-MCNC: 3.16 MG/DL (ref 0.66–1.25)
ERYTHROCYTE [DISTWIDTH] IN BLOOD BY AUTOMATED COUNT: 12.3 % (ref 10–15)
GFR SERPL CREATININE-BSD FRML MDRD: 19 ML/MIN/{1.73_M2}
GFR SERPL CREATININE-BSD FRML MDRD: 20 ML/MIN/{1.73_M2}
GLUCOSE BLDC GLUCOMTR-MCNC: 196 MG/DL (ref 70–99)
GLUCOSE BLDC GLUCOMTR-MCNC: 200 MG/DL (ref 70–99)
GLUCOSE BLDC GLUCOMTR-MCNC: 233 MG/DL (ref 70–99)
GLUCOSE BLDC GLUCOMTR-MCNC: 237 MG/DL (ref 70–99)
GLUCOSE BLDC GLUCOMTR-MCNC: 256 MG/DL (ref 70–99)
GLUCOSE SERPL-MCNC: 205 MG/DL (ref 70–99)
GLUCOSE SERPL-MCNC: 260 MG/DL (ref 70–99)
HCT VFR BLD AUTO: 32.2 % (ref 40–53)
HGB BLD-MCNC: 10.7 G/DL (ref 13.3–17.7)
MAGNESIUM SERPL-MCNC: 1.8 MG/DL (ref 1.6–2.3)
MAGNESIUM SERPL-MCNC: 1.9 MG/DL (ref 1.6–2.3)
MCH RBC QN AUTO: 28.8 PG (ref 26.5–33)
MCHC RBC AUTO-ENTMCNC: 33.2 G/DL (ref 31.5–36.5)
MCV RBC AUTO: 87 FL (ref 78–100)
PLATELET # BLD AUTO: 295 10E9/L (ref 150–450)
POTASSIUM SERPL-SCNC: 3.8 MMOL/L (ref 3.4–5.3)
POTASSIUM SERPL-SCNC: 3.9 MMOL/L (ref 3.4–5.3)
RBC # BLD AUTO: 3.72 10E12/L (ref 4.4–5.9)
SODIUM SERPL-SCNC: 134 MMOL/L (ref 133–144)
SODIUM SERPL-SCNC: 136 MMOL/L (ref 133–144)
WBC # BLD AUTO: 6.8 10E9/L (ref 4–11)

## 2019-01-24 PROCEDURE — 80048 BASIC METABOLIC PNL TOTAL CA: CPT | Performed by: HOSPITALIST

## 2019-01-24 PROCEDURE — 00000146 ZZHCL STATISTIC GLUCOSE BY METER IP

## 2019-01-24 PROCEDURE — 83735 ASSAY OF MAGNESIUM: CPT | Performed by: HOSPITALIST

## 2019-01-24 PROCEDURE — 25000132 ZZH RX MED GY IP 250 OP 250 PS 637: Performed by: STUDENT IN AN ORGANIZED HEALTH CARE EDUCATION/TRAINING PROGRAM

## 2019-01-24 PROCEDURE — 25000132 ZZH RX MED GY IP 250 OP 250 PS 637: Performed by: NURSE PRACTITIONER

## 2019-01-24 PROCEDURE — 36415 COLL VENOUS BLD VENIPUNCTURE: CPT | Performed by: HOSPITALIST

## 2019-01-24 PROCEDURE — 97530 THERAPEUTIC ACTIVITIES: CPT | Mod: GO | Performed by: OCCUPATIONAL THERAPIST

## 2019-01-24 PROCEDURE — 40000133 ZZH STATISTIC OT WARD VISIT: Performed by: OCCUPATIONAL THERAPIST

## 2019-01-24 PROCEDURE — 21400000 ZZH R&B CCU UMMC

## 2019-01-24 PROCEDURE — 97110 THERAPEUTIC EXERCISES: CPT | Mod: GO | Performed by: OCCUPATIONAL THERAPIST

## 2019-01-24 PROCEDURE — 25000128 H RX IP 250 OP 636: Performed by: STUDENT IN AN ORGANIZED HEALTH CARE EDUCATION/TRAINING PROGRAM

## 2019-01-24 PROCEDURE — 25000132 ZZH RX MED GY IP 250 OP 250 PS 637: Performed by: HOSPITALIST

## 2019-01-24 PROCEDURE — 85027 COMPLETE CBC AUTOMATED: CPT | Performed by: HOSPITALIST

## 2019-01-24 RX ORDER — MAGNESIUM SULFATE HEPTAHYDRATE 40 MG/ML
2 INJECTION, SOLUTION INTRAVENOUS ONCE
Status: COMPLETED | OUTPATIENT
Start: 2019-01-24 | End: 2019-01-24

## 2019-01-24 RX ORDER — FUROSEMIDE 20 MG
20 TABLET ORAL ONCE
Status: COMPLETED | OUTPATIENT
Start: 2019-01-24 | End: 2019-01-24

## 2019-01-24 RX ORDER — AMLODIPINE BESYLATE 5 MG/1
5 TABLET ORAL DAILY
Status: DISCONTINUED | OUTPATIENT
Start: 2019-01-25 | End: 2019-01-25

## 2019-01-24 RX ORDER — POTASSIUM CHLORIDE 750 MG/1
10 TABLET, EXTENDED RELEASE ORAL ONCE
Status: COMPLETED | OUTPATIENT
Start: 2019-01-24 | End: 2019-01-24

## 2019-01-24 RX ORDER — FUROSEMIDE 80 MG
80 TABLET ORAL
Status: DISCONTINUED | OUTPATIENT
Start: 2019-01-24 | End: 2019-01-24

## 2019-01-24 RX ORDER — ATORVASTATIN CALCIUM 40 MG/1
40 TABLET, FILM COATED ORAL EVERY EVENING
Qty: 30 TABLET | Refills: 0 | Status: SHIPPED | OUTPATIENT
Start: 2019-01-24 | End: 2019-01-25

## 2019-01-24 RX ORDER — CARVEDILOL 25 MG/1
25 TABLET ORAL 2 TIMES DAILY WITH MEALS
Qty: 60 TABLET | Refills: 0 | Status: SHIPPED | OUTPATIENT
Start: 2019-01-24 | End: 2019-01-25

## 2019-01-24 RX ADMIN — FUROSEMIDE 20 MG: 20 TABLET ORAL at 19:35

## 2019-01-24 RX ADMIN — APIXABAN 5 MG: 5 TABLET, FILM COATED ORAL at 19:35

## 2019-01-24 RX ADMIN — CARVEDILOL 25 MG: 25 TABLET, FILM COATED ORAL at 18:06

## 2019-01-24 RX ADMIN — FUROSEMIDE 80 MG: 80 TABLET ORAL at 08:24

## 2019-01-24 RX ADMIN — AMLODIPINE BESYLATE 10 MG: 10 TABLET ORAL at 08:24

## 2019-01-24 RX ADMIN — PANTOPRAZOLE SODIUM 40 MG: 40 TABLET, DELAYED RELEASE ORAL at 08:24

## 2019-01-24 RX ADMIN — ATORVASTATIN CALCIUM 40 MG: 40 TABLET, FILM COATED ORAL at 19:35

## 2019-01-24 RX ADMIN — LEVOTHYROXINE SODIUM 137 MCG: 25 TABLET ORAL at 22:07

## 2019-01-24 RX ADMIN — ISOSORBIDE MONONITRATE 30 MG: 30 TABLET, EXTENDED RELEASE ORAL at 08:24

## 2019-01-24 RX ADMIN — CARVEDILOL 25 MG: 25 TABLET, FILM COATED ORAL at 08:24

## 2019-01-24 RX ADMIN — FUROSEMIDE 80 MG: 80 TABLET ORAL at 15:59

## 2019-01-24 RX ADMIN — POTASSIUM CHLORIDE 10 MEQ: 750 TABLET, EXTENDED RELEASE ORAL at 19:35

## 2019-01-24 RX ADMIN — AMOXICILLIN AND CLAVULANATE POTASSIUM 1 TABLET: 875; 125 TABLET, FILM COATED ORAL at 09:30

## 2019-01-24 RX ADMIN — APIXABAN 5 MG: 5 TABLET, FILM COATED ORAL at 08:24

## 2019-01-24 RX ADMIN — MAGNESIUM SULFATE HEPTAHYDRATE 2 G: 40 INJECTION, SOLUTION INTRAVENOUS at 19:36

## 2019-01-24 ASSESSMENT — ACTIVITIES OF DAILY LIVING (ADL)
ADLS_ACUITY_SCORE: 16
ADLS_ACUITY_SCORE: 16
ADLS_ACUITY_SCORE: 17
ADLS_ACUITY_SCORE: 16

## 2019-01-24 NOTE — PROGRESS NOTES
Care Coordinator - Discharge Planning    Admission Date/Time:  1/17/2019  Attending MD:  Zuly Bell MD   Data  Date of initial CC assessment:  1/18/19  Chart reviewed, discussed with interdisciplinary team.   Patient was admitted for:   1. Acute on chronic systolic congestive heart failure (H)    2. Fluid overload    3. Acute systolic congestive heart failure (H)    4. Atherosclerosis of native coronary artery of native heart without angina pectoris    5. Stented coronary artery    6. Personal history of tobacco use, presenting hazards to health    7. Hypertension goal BP (blood pressure) < 140/90    8. Type 2 diabetes mellitus with diabetic nephropathy, with long-term current use of insulin (H)    9. Cerebrovascular accident (CVA) due to embolism of left posterior cerebral artery (H)    Assessment   Full assessment completed in previous note  Concerns with insurance coverage for discharge needs: Pt now expressing concern about the cost of his home Insulin.   Current Living Situation: Patient lives with spouse.  Support System: Supportive and Involved wife.   Services Involved: none currently.   Transportation at Discharge: Pt's wife will provide pt with a ride home upon discharge.   Transportation to Medical Appointments: wife will provide.    - Name of caregiver: wife.   Barriers to Discharge: medical condition.   Coordination of Care and Referrals: No home care needs per pt.     This writer spoke with pt yesterday regarding his concern for insurance coverage for his home Insulin. Pt had said that he has new insurance and does not know what his new co-pays will be for his changed Insulin. Pt also had questions about his Insulin. This writer spoke with pt's MD who discussed with pt about his home Insulin plan. MD also said that pt will f/u with his outpt Endocrinologist. The  discharge pharmacy was also contacted and gave pt a quote for his co-pays for his home Insulin. This writer spoke with pt again today  "and pt confirmed that he is in agreement with his new co-pays from [facility] discharge phamacy but pt added that he will again research the \"cheapest\" pharmacy for his home Insulin.   Plan  Anticipated Discharge Date:  TBD  Anticipated Discharge Plan:  Discharge to home with outpt f/u.     DEBRA PARMAR RN BSN  Care Coordinator Unit   [phone]    "

## 2019-01-24 NOTE — PLAN OF CARE
Alert and oriented x4. VSS. Afib. Sats 90s on room air. Denies pain. 20 K given one time. On 2 gram Na diet with 2L fluid restriction. Appetite good. Voiding good amounts. PIV saline locked. Up independently to bathroom and ambulating in halls.  Blood glucose checks with sliding scale insulin and carb coverage. Plan for possible discharge to home this week pending fluid status. Will continue to monitor and notify MDs accordingly.   Pt was sitting in the lounge reading, during the first part of shift. She was offered a repeat trazodone, which she took. She went back to her room and rested. She was up briefly one other time-wondering if her glasses  was in her belongings. Her demeanor was pleasant, and she was calm.    0521) Pt is up again; went to the lounge to read. She is denying pre discharge anxiety.

## 2019-01-24 NOTE — PLAN OF CARE
Discharge Planner OT 6C  Patient plan for discharge: home asap  Current status: Pt noted to have decreased edema in LE, quick measure noted decrease of 4cm on L ankle. Donned comperm for day use and reduction of edema.  Pt motivated to keep up activity, ambulates to and from gym with no assist although after 26' on Nustep workload 3-4 pt became lightheaded upon return to room, stumbling and needing CGA.  BP dropping from 109/64 (Map 83) to 92/71 (79). Recovered in supine. RN alerted.  Barriers to return to prior living situation: BP, edema  Recommendations for discharge: home w A   Rationale for recommendations: Pt at or near baseline, declines additional OP CR. Appropriate to continue Phase I CR during current admission.       Entered by: Nichole Lassiter 01/24/2019 10:35 AM

## 2019-01-24 NOTE — PLAN OF CARE
Discharge Planner OT   Patient plan for discharge: Pt would like to return home.  Current status: Pt required SBA/CGA and vc's for transfer supine<->seated EOB and sit<->standing. Pt ambulated ~250ft with CGA, and vc's. Pt completed 26 minutes of Nustep exercise with fair tolerance. VSS.   Barriers to return to prior living situation: Decreased strength/endurance. Fatigue.   Recommendations for discharge: Home with A and OP CR.   Rationale for recommendations: Pt will benefit from continued therapy to maximize functional independence.        Entered by: Mukul Donahue 01/23/2019 10:18 PM

## 2019-01-24 NOTE — PLAN OF CARE
AVSS.  96% on RA.  Pt denies SOB/discomfort. LS clear.  Voiding per urinal.  UOP adequate.  Ambulating independently.  Rhythm: Afib 65-80's.  Rested between cares/assessments in no apparent distress.  Lymph wraps to BLE during the day.  Pt requested off at night.  Dressing changes to R toe per POC.  Continue to monitor fluid status/response to diuretics, labs, kidney function.  Notify team with any issues/concerns.

## 2019-01-24 NOTE — PROGRESS NOTES
[facility]   Cardiology   Progress Note     Interval History:  - NAEO   - Net negative 3L yesterday. Continues to respond well to lasix  - Patient feels legs back to normal size, did feel lightheaded after coming back from working w/ PT      Physical Exam:  Temp:  [97.7  F (36.5  C)-98.5  F (36.9  C)] 98  F (36.7  C)  Heart Rate:  [70-83] 70  Resp:  [16-18] 18  BP: (108-133)/(62-84) 116/80  SpO2:  [94 %-100 %] 99 %    Wt:   Wt Readings from Last 5 Encounters:   01/23/19 110.3 kg (243 lb 1.6 oz)   01/14/19 119.3 kg (263 lb)   09/06/18 115.1 kg (253 lb 11.2 oz)   03/12/18 114.3 kg (252 lb)   03/03/18 109.6 kg (241 lb 9.6 oz)       General: NAD, on RA, resting comfortably   CV: Irregular rhythm. Regular rate. No murmur appreciated. No rubs or gallops. Peripheral radial pulse intact.  Resp: No increased work of breathing or use of accessory muscles, breathing comfortably on room air.  Lung sounds clear throughout/bilaterally  Abdomen: No obvious scars or hernias. Normal active bowel sounds.  Abdomen is soft. No distension, non-tender to palpation. No rebound tenderness or guarding. percussion WNL   Extremities: warm, legs wrapped, 1+ LE edema up to the knees, improved from yesterday  Skin:  Warm and dry. No erythema, rashes, ulceration or diaphoresis.  Neuro: Alert and oriented x3.  nml gait     Medications:    [START ON 1/25/2019] amLODIPine  5 mg Oral Daily     apixaban ANTICOAGULANT  5 mg Oral BID     atorvastatin  40 mg Oral QPM     carvedilol  25 mg Oral BID w/meals     furosemide  80 mg Oral BID     insulin aspart   Subcutaneous QAM AC     insulin aspart   Subcutaneous Daily with lunch     insulin aspart   Subcutaneous Daily with supper     insulin aspart  1-10 Units Subcutaneous TID AC     insulin aspart  1-7 Units Subcutaneous At Bedtime     insulin glargine  23 Units Subcutaneous QAM AC     isosorbide mononitrate  30 mg Oral Daily     levothyroxine  137 mcg Oral At Bedtime      pantoprazole  40 mg Oral QAM AC       - MEDICATION INSTRUCTIONS -         Labs:   CMP  Recent Labs   Lab 19  0526 19  1726 19  0536 19  1852  19  1652    134 135 133   < > 136   POTASSIUM 3.9 3.8 3.5 3.7   < > 3.7   CHLORIDE 96 95 97 96   < > 103   CO2 28 29 28 28   < > 21   ANIONGAP 13 10 10 9   < > 12   * 152* 164* 230*   < > 317*   BUN 39* 37* 36* 38*   < > 25   CR 3.01* 3.23* 3.03* 3.16*   < > 2.91*   GFRESTIMATED 20* 19* 20* 19*   < > 21*   GFRESTBLACK 23* 22* 23* 22*   < > 24*   BETTIE 8.9 8.9 9.0 8.3*   < > 8.4*   MAG 1.9 2.0 1.9 1.7   < > 2.0   PROTTOTAL  --   --   --   --   --  7.5   ALBUMIN  --   --   --   --   --  3.3*   BILITOTAL  --   --   --   --   --  1.2   ALKPHOS  --   --   --   --   --  77   AST  --   --   --   --   --  10   ALT  --   --   --   --   --  17    < > = values in this interval not displayed.     CBC  Recent Labs   Lab 19  0526 19  0536 19  0522 19  0457 19  0855   WBC 6.8  --  6.5 6.6 7.7   RBC 3.72*  --  3.69* 3.81* 4.05*   HGB 10.7*  --  10.5* 10.9* 11.8*   HCT 32.2*  --  32.0* 33.2* 35.4*   MCV 87  --  87 87 87   MCH 28.8  --  28.5 28.6 29.1   MCHC 33.2  --  32.8 32.8 33.3   RDW 12.3  --  12.8 12.8 13.0    292 260 282 303     INR  Recent Labs   Lab 19  0600 19  0544 19  0611   INR 1.43* 1.40* 1.28*     Echo 19:  Recent Results (from the past 4320 hour(s))   Echocardiogram Complete    Narrative    [id number]  [id number]  [id number]  [id number]^WILL^LUNA           [facility],[place]  Echocardiography Laboratory  [address] [place] [id number]     Name: PORTER YANCEY  MRN: [id number]  : 1950  Study Date: 2019 07:41 AM  Age: 68 yrs  Gender: Male  Patient Location: [facility]  Reason For Study: CHF  Ordering Physician: LUNA SOLIS  Referring Physician: LUNA SOLIS  Performed By: Ree Valdez     BSA: 2.5 m2  Height: 76 in  Weight: 254  lb  HR: 78  BP: 136/77 mmHg  _____________________________________________________________________________  __        Procedure  Complete Portable Echo Adult. Contrast Optison. Patient was given 6 ml mixture  of 3 ml Optison and 6 ml saline. 3 ml wasted.  _____________________________________________________________________________  __        Interpretation Summary  Moderately (EF 35-40%) reduced left ventricular function is present. Regional  wall abnormality involving the septum, apex and inferior walls. No LV apical  thrombus.  Global right ventricular function is normal. The right ventricle is normal  size.  The inferior vena cava was dilated at 2.6 cm without respiratory variability,  consistent with increased right atrial pressure.  Estimated mean right atrial pressure is 15 mmHg (significantly elevated).  No significant valvular dysfunction.  No pericardial effusion is present.  Rhythm was atrial fibrillation during the study.     This study was compared with the study from 9/8/2018 .  IVC doesnot collapse in this study. RA pressure is more elevated. RWA is  similar.     _____________________________________________________________________________  __        Left Ventricle  Left ventricular size is normal. Left ventricular wall thickness is normal.  Diastolic function not assessed due to atrial fibrillation. Moderately (EF 35-  40%) reduced left ventricular function is present. Mid anteroseptum akinetic.  Inferior wall hypokinesis is present. Apical wall hypokinesis is present.     Right Ventricle  Global right ventricular function is normal. The right ventricle is normal  size.     Atria  Moderate left atrial enlargement is present. Moderate right atrial enlargement  is present. The atrial septum is intact as assessed by color Doppler .     Mitral Valve  The mitral valve is normal. Trace to mild mitral insufficiency is present.        Aortic Valve  Mild aortic valve sclerosis is present. The aortic valve is  tricuspid. Trace  aortic insufficiency is present.     Tricuspid Valve  Trace to mild tricuspid insufficiency is present. Pulmonary artery systolic  pressure cannot be assessed.     Pulmonic Valve  The pulmonic valve is normal.     Vessels  The aorta root is normal. Estimated mean right atrial pressure is 15 mmHg  (significantly elevated). The inferior vena cava was dilated at 2.6 cm without  respiratory variability, consistent with increased right atrial pressure.     Pericardium  No pericardial effusion is present.        Compared to Previous Study  This study was compared with the study from 2018 . IVC doesnot collapse in  this study. RA pressure is more elevated. Otherwise direct comparison of the  images doesnot suggest significant change. RWA is similar.  _____________________________________________________________________________  __  MMode/2D Measurements & Calculations  IVSd: 0.97 cm     LVIDd: 5.5 cm  LVIDs: 4.2 cm  LVPWd: 1.0 cm  FS: 23.7 %  LV mass(C)d: 209.9 grams  LV mass(C)dI: 85.6 grams/m2  Ao root diam: 3.3 cm  asc Aorta Diam: 3.7 cm  LVOT diam: 2.3 cm  LVOT area: 4.2 cm2  LA Volume (BP): 116.0 ml  LA Volume Index (BP): 47.3 ml/m2  RWT: 0.37           Doppler Measurements & Calculations  MV E max yovani: 125.7 cm/sec  MV dec time: 0.14 sec  Ao V2 max: 179.0 cm/sec  Ao max P.0 mmHg  Ao V2 mean: 121.0 cm/sec  Ao mean P.0 mmHg  Ao V2 VTI: 34.6 cm  PRISCILA(I,D): 2.1 cm2  PRISCILA(V,D): 2.1 cm2  LV V1 max PG: 3.1 mmHg  LV V1 max: 88.7 cm/sec  LV V1 VTI: 17.7 cm  SV(LVOT): 73.5 ml  SI(LVOT): 30.0 ml/m2  PA V2 max: 94.7 cm/sec  PA max PG: 3.6 mmHg  PA acc time: 0.07 sec  AV Yovani Ratio (DI): 0.50  PRISCILA Index (cm2/m2): 0.87  E/E' av.2  Lateral E/e': 17.8  Medial E/e': 22.6        _____________________________________________________________________________  __        Report approved by: Kimberly CACERES 2019 09:38 AM                     ASSESSMENT/PLAN:  Jayro Ortegazahra is a 68 year old male with  a history of type 2 diabetes complicated by prior diabetic foot infections, subacute left occipital CVA, coronary artery disease with stents, and associated ischemic cardiomyopathy with most recent echocardiogram completed 9/2018 showing an ejection fraction of 35-40% with indeterminate diastolic dysfunction, stress testing completed on 9/14/2018 showing a fixed large mid to distal anterior and anteroseptal defect, atrial fibrillation on chronic apixaban, untreated obstructive sleep apnea, recent admission with second toe osteomyelitis s/p toe amputation, who presented to ED with acute on chronic CHF exacerbtion.    Changes today:   - Switched Lasix 60mg IV BID to 80mg PO BID; likely discharge home tomorrow   - Decrease Norvasc 10mg ->5mg every day given lightheadedness     # Acute on chronic systolic CHF exacerbation 2/2 ABNER, recent surgeries and infection  # Hx CAD s/p multiple stents  # Hx ICM with EF 35-40%  Presented with orthopnea, 9 kg weight gain, BLE, and worsening ANDREWS over the past week in the setting of known ischemic cardiomyopathy with a most recent EF 35-40% in September 2018. On admission, he was found to have elevated BNP 8,810 with normal troponins and an unchanged EKG. Acute exacerbation is secondary to ABNER from previous hospitalization (Cr peaked at 3.45 on 1/13), thought to be 2/2 antibiotics he had received (i.e. Vancomycin). TTE from 1/18 showed dilated IVC and elevated RA pressures consistent with fluid overload; EF still at 35-40% and regional wall abnormalities involving the septum, apex, and inferior walls that was also seen on echo from September.  - Strict I&Os  - Daily weights (goal wt <250lbs)   - Monitor K/Mg (q12 BMP)  - Switched Lasix 60mg IV BID to 80mg PO BID   - Continue plavix 75 mg, imdur 30 mg, atorva 40 mg  - Switched metoprolol succ 50mg every day to Coreg 12.5mg BID for HTN in the setting of HFpEF on 1/19, increased to 25mg BID for HTN on 1/20  - Holding lisinopril 2/2 ABNER;  per renal, resume when Cr <1.0  - CORE following, pt to see new cardiologist in [place] and follow up with heart failure clinic when ready for discharge (apt on 2/5)  - Lymphedema consulted     # Afib  Doed2zczi score 5 (age, HTN, DM, stroke). Discovered in 2017 after patient suffered an left occipital stroke thought to be embolic;  Rates controlled; it seems his cardiologist had considered rhythm control in the past but this was never pursued  - Coreg as above   - Apixaban to 5mg BID  (patient notes issues w/ affording medications, offered to switch to warfarin but patient would prefer apixaban)     # ABNER on CKD  Baseline is 0.8-0.9. Had ABNER while previous admission for toe amputation. Peak was 3.45 on 1/13. Slowly improving. Could be multifactorial, secondary to cardiorenal, ABx use (he was at first on vancomycin), Hx of DM, and lisinopril. Renal US was normal. Cr stable-2.91 on admission, 2.94 today    - Diuresis as above  - Monitor, and avoid nephrotoxins  - Holding lisinopril 2/2 ABNER; per renal, resume when Cr <1.0    - Will touch base w/ nephrology regarding setting up an outpatient apt      # Recent osteomyelitis s/p rt second toe amputation   Had surgery on 1/6 and 1/8. Received vancomycin -> ceftriaxone while inpatient, discharged on Augmentin for 10 days, until 1/23. Needs podiatry follow up upon discharge. Ortho saw patient on 1/22 and removed a suture   - Last dose Augmentin today    # Type 2 DM  - Glargine to 26U every day (PTA Lantus was 10 U qday [pt was on 35 U BID previously, but dose decreased while previous admission at OSH])   - placed on high dose correction scale insulin, carb-based dosing, hypoglycemic protocol.   - Patient has had issues affording medications on Medicare, recently added [employer] insurance; ran diabetic meds through discharge pharmacy: Toujeo would be $94/month, Lantus would be $141/month (both are glargine)   - Follows w/ an endocrinologist as an outpatient      #  Hypertension  - Decrease Norvasc 10mg ->5mg every day given lightheadedness    - continue imdur 30 mg qday  - Switched metoprolol succ 50mg every day to Coreg 12.5mg BID for HTN in the setting of HFpEF on 1/19, increased to 25mg BID for HTN on 1/20   - hold lisinopril 20 mg  - prn Hydralazine for SBP > 160     # Hyperlipidemia:  Patient previously on Simvastatin 40 mg, will switch to high intensity statin in patient with known CAD  - Lipitor 40 mg daily    # Hypothyrodism:   -continue home levothyroxine 137 mcg     Code status: Full  FEN: Cardiac/Diabetic  DVT Prophylaxis: PTA apixaban  Dispo: discharge home tomorrow     Patient discussed with Dr. Bell, who agrees with above plan.    Corina Dumont M.D.   PGY2 Internal Medicine   [phone]

## 2019-01-24 NOTE — PLAN OF CARE
D: Patient admitted 1/17 for HF exacerbation.     I: Monitored vitals and assessed patient status. Right toe dressing changed per POC. BG's elevated. Lymph wraps in place. Dizziness noted post nustep/ambulation, BP stable.  Changed: norvasc dc'd; transitioned to PO lasix from IV; lantus increased to 26 units daily    A: VSS. A0x4. A fib 65-80's. Afebrile. Urinating adequately. Ambulating independently.    P: Anticipate discharge home tomorrow. Continue to assess and monitor, notify Cards 1 with concerns.

## 2019-01-25 ENCOUNTER — APPOINTMENT (OUTPATIENT)
Dept: OCCUPATIONAL THERAPY | Facility: CLINIC | Age: 69
DRG: 292 | End: 2019-01-25
Payer: COMMERCIAL

## 2019-01-25 VITALS
RESPIRATION RATE: 18 BRPM | HEART RATE: 77 BPM | WEIGHT: 238.5 LBS | SYSTOLIC BLOOD PRESSURE: 119 MMHG | HEIGHT: 76 IN | TEMPERATURE: 98.3 F | BODY MASS INDEX: 29.04 KG/M2 | DIASTOLIC BLOOD PRESSURE: 80 MMHG | OXYGEN SATURATION: 96 %

## 2019-01-25 LAB
ANION GAP SERPL CALCULATED.3IONS-SCNC: 10 MMOL/L (ref 3–14)
BUN SERPL-MCNC: 41 MG/DL (ref 7–30)
CALCIUM SERPL-MCNC: 8.7 MG/DL (ref 8.5–10.1)
CHLORIDE SERPL-SCNC: 96 MMOL/L (ref 94–109)
CO2 SERPL-SCNC: 29 MMOL/L (ref 20–32)
CREAT SERPL-MCNC: 3.02 MG/DL (ref 0.66–1.25)
GFR SERPL CREATININE-BSD FRML MDRD: 20 ML/MIN/{1.73_M2}
GLUCOSE BLDC GLUCOMTR-MCNC: 181 MG/DL (ref 70–99)
GLUCOSE BLDC GLUCOMTR-MCNC: 187 MG/DL (ref 70–99)
GLUCOSE BLDC GLUCOMTR-MCNC: 193 MG/DL (ref 70–99)
GLUCOSE SERPL-MCNC: 180 MG/DL (ref 70–99)
MAGNESIUM SERPL-MCNC: 2.4 MG/DL (ref 1.6–2.3)
POTASSIUM SERPL-SCNC: 3.6 MMOL/L (ref 3.4–5.3)
SODIUM SERPL-SCNC: 135 MMOL/L (ref 133–144)

## 2019-01-25 PROCEDURE — 25000132 ZZH RX MED GY IP 250 OP 250 PS 637: Performed by: STUDENT IN AN ORGANIZED HEALTH CARE EDUCATION/TRAINING PROGRAM

## 2019-01-25 PROCEDURE — 83735 ASSAY OF MAGNESIUM: CPT | Performed by: HOSPITALIST

## 2019-01-25 PROCEDURE — 25000132 ZZH RX MED GY IP 250 OP 250 PS 637: Performed by: HOSPITALIST

## 2019-01-25 PROCEDURE — 25000132 ZZH RX MED GY IP 250 OP 250 PS 637: Performed by: PHYSICIAN ASSISTANT

## 2019-01-25 PROCEDURE — 36415 COLL VENOUS BLD VENIPUNCTURE: CPT | Performed by: HOSPITALIST

## 2019-01-25 PROCEDURE — 80048 BASIC METABOLIC PNL TOTAL CA: CPT | Performed by: HOSPITALIST

## 2019-01-25 PROCEDURE — 97110 THERAPEUTIC EXERCISES: CPT | Mod: GO

## 2019-01-25 PROCEDURE — 99239 HOSP IP/OBS DSCHRG MGMT >30: CPT | Performed by: STUDENT IN AN ORGANIZED HEALTH CARE EDUCATION/TRAINING PROGRAM

## 2019-01-25 PROCEDURE — 00000146 ZZHCL STATISTIC GLUCOSE BY METER IP

## 2019-01-25 PROCEDURE — 40000133 ZZH STATISTIC OT WARD VISIT

## 2019-01-25 PROCEDURE — 97530 THERAPEUTIC ACTIVITIES: CPT | Mod: GO

## 2019-01-25 PROCEDURE — 25000132 ZZH RX MED GY IP 250 OP 250 PS 637: Performed by: INTERNAL MEDICINE

## 2019-01-25 RX ORDER — AMLODIPINE BESYLATE 2.5 MG/1
2.5 TABLET ORAL DAILY
Status: DISCONTINUED | OUTPATIENT
Start: 2019-01-26 | End: 2019-01-25 | Stop reason: HOSPADM

## 2019-01-25 RX ORDER — FUROSEMIDE 80 MG
80 TABLET ORAL
Status: DISCONTINUED | OUTPATIENT
Start: 2019-01-25 | End: 2019-01-25 | Stop reason: HOSPADM

## 2019-01-25 RX ORDER — LISINOPRIL 20 MG/1
20 TABLET ORAL 2 TIMES DAILY
Qty: 60 TABLET | Refills: 0 | Status: SHIPPED | OUTPATIENT
Start: 2019-01-25 | End: 2019-02-14

## 2019-01-25 RX ORDER — AMLODIPINE BESYLATE 5 MG/1
2.5 TABLET ORAL DAILY
Qty: 15 TABLET | Refills: 0 | Status: SHIPPED | OUTPATIENT
Start: 2019-01-25 | End: 2019-01-25

## 2019-01-25 RX ORDER — AMLODIPINE BESYLATE 5 MG/1
2.5 TABLET ORAL DAILY
Qty: 15 TABLET | Refills: 0 | Status: SHIPPED | OUTPATIENT
Start: 2019-01-25 | End: 2019-02-14

## 2019-01-25 RX ORDER — FUROSEMIDE 80 MG
80 TABLET ORAL
Qty: 60 TABLET | Refills: 0 | Status: SHIPPED | OUTPATIENT
Start: 2019-01-25 | End: 2019-02-14 | Stop reason: DRUGHIGH

## 2019-01-25 RX ORDER — ATORVASTATIN CALCIUM 40 MG/1
40 TABLET, FILM COATED ORAL EVERY EVENING
Qty: 30 TABLET | Refills: 0 | Status: SHIPPED | OUTPATIENT
Start: 2019-01-25 | End: 2019-02-22

## 2019-01-25 RX ORDER — CARVEDILOL 25 MG/1
25 TABLET ORAL 2 TIMES DAILY WITH MEALS
Qty: 60 TABLET | Refills: 0 | Status: SHIPPED | OUTPATIENT
Start: 2019-01-25 | End: 2019-02-05

## 2019-01-25 RX ORDER — FUROSEMIDE 80 MG
80 TABLET ORAL
Qty: 60 TABLET | Refills: 0 | Status: SHIPPED | OUTPATIENT
Start: 2019-01-25 | End: 2019-01-25

## 2019-01-25 RX ORDER — CLOPIDOGREL BISULFATE 75 MG/1
75 TABLET ORAL DAILY
Qty: 90 TABLET | Refills: 2 | Status: SHIPPED | OUTPATIENT
Start: 2019-01-25 | End: 2019-02-05

## 2019-01-25 RX ADMIN — ISOSORBIDE MONONITRATE 30 MG: 30 TABLET, EXTENDED RELEASE ORAL at 08:32

## 2019-01-25 RX ADMIN — FUROSEMIDE 120 MG: 80 TABLET ORAL at 08:31

## 2019-01-25 RX ADMIN — AMLODIPINE BESYLATE 5 MG: 5 TABLET ORAL at 08:31

## 2019-01-25 RX ADMIN — APIXABAN 5 MG: 5 TABLET, FILM COATED ORAL at 08:31

## 2019-01-25 RX ADMIN — CARVEDILOL 25 MG: 25 TABLET, FILM COATED ORAL at 08:32

## 2019-01-25 RX ADMIN — PANTOPRAZOLE SODIUM 40 MG: 40 TABLET, DELAYED RELEASE ORAL at 08:32

## 2019-01-25 RX ADMIN — FUROSEMIDE 80 MG: 80 TABLET ORAL at 15:44

## 2019-01-25 ASSESSMENT — ACTIVITIES OF DAILY LIVING (ADL)
ADLS_ACUITY_SCORE: 16
ADLS_ACUITY_SCORE: 16
ADLS_ACUITY_SCORE: 17

## 2019-01-25 ASSESSMENT — MIFFLIN-ST. JEOR: SCORE: 1953.33

## 2019-01-25 NOTE — PROGRESS NOTES
CLINICAL NUTRITION SERVICES    Reviewed nutrition risk factors due to LOS. Per flow sheets, pt is eating 100% of meals. Pt stated he is eating 3 meals/day and his appetite is very good/has improved a lot since admission. For lunch, pt ordered chicken breast with gravy, salad with oil and vinegar, fries, and coffee- pt stated he finished it all. Wt Hx: 112.9 kg (10/25/17), 114.3 kg (3/12/18), 115.1 kg (9/6/18), 119.3 kg (1/14/19), 115.5 kg (1/18/19), 108.2 kg (1/25)- Diuresis this hospital admission. Otherwise, no significant or severe wt loss PTA.  No nutrition issues identified at this time. RD will follow via rounds at this time, unless consulted.    Phylicia Garner  Dietetic Intern    I have read and agree with the above nutrition note.   Melissa King, MS, RD, LD, [facility]   6C Pgr:  [phone]

## 2019-01-25 NOTE — PLAN OF CARE
Pt with HF continues in Afib 70s-90s. Stockings on LEs, some mild edema. Toe wound dressing changed on day shift. K+, and mag replaced. Extra 20 mg lasix po given for low urine output. Elevated blood sugars covered with sliding scale and carb counting insulin.Up with SBA. VSS.

## 2019-01-25 NOTE — PLAN OF CARE
"/61 (BP Location: Right arm)   Pulse 77   Temp 98.4  F (36.9  C) (Oral)   Resp 18   Ht 1.93 m (6' 4\")   Wt 110.3 kg (243 lb 1.6 oz)   SpO2 95%   BMI 29.59 kg/m   RA.Afib 70-90 and denies any nausea or dizziness.Up to bathroom independently and blood glucose during the night was 187 and not covered.Denies any pain and has mild LE edema.Lungs clear.Voiding good amounts and continues on fluid restriction of 2000 ml.Plan today to increase lasix to 120 mg Bid.  "

## 2019-01-25 NOTE — PLAN OF CARE
Discharge Planner OT   Patient plan for discharge: Home  Current status: SBA for donning shoes with figure-four technique. Amb with SBA ~250ft with no assistive device, 20min on nustep, c/o of dizziness after standing, hypotensive response to exercise, dependently w/c back to rm.   Barriers to return to prior living situation: Hypotensive response to activity, medical status  Recommendations for discharge: home w A   Rationale for recommendations: Pt at or near baseline, declines additional OP CR. Appropriate to continue Phase I CR during current admission.       Entered by: Mary Perez 01/25/2019 9:54 AM

## 2019-01-28 ENCOUNTER — PATIENT OUTREACH (OUTPATIENT)
Dept: CARE COORDINATION | Facility: CLINIC | Age: 69
End: 2019-01-28

## 2019-01-28 ENCOUNTER — TELEPHONE (OUTPATIENT)
Dept: FAMILY MEDICINE | Facility: CLINIC | Age: 69
End: 2019-01-28

## 2019-01-28 NOTE — PROGRESS NOTES
Clinic Care Coordination Contact  [facility]/Voicemail    Clinical Data: Care Coordinator Outreach. Patient was hospitalized at [facility] from 01/17-01/25 with a diagnosis of CHF. Core clinic appointment scheduled for 02/05. Patient is to also follow up with nephrology within 1 month, and no Primary Care Provider follow up appointment scheduled.   Outreach attempted x 1.  Left message on voicemail with call back information and requested return call.  Plan: Care Coordinator will try to reach patient again in 1-2 business days.    Laenn Wolf RN Care Coordinator  [facility] [place]  Email: [email or web address]  Phone: [phone]

## 2019-01-28 NOTE — PLAN OF CARE
Occupational Therapy Discharge Summary    Reason for therapy discharge:    Discharged to home.    Progress towards therapy goal(s). See goals on Care Plan in [facility] electronic health record for goal details.  Goals partially met.  Barriers to achieving goals:   discharge from facility.    Therapy recommendation(s):    Pt declining OP CR

## 2019-01-28 NOTE — TELEPHONE ENCOUNTER
"Patient has CC. No outreach needed by clinic.     Hospital/TCU/ED for chronic condition Discharge Protocol    \"Hi, my name is Elizabeth Lewis, a registered nurse, and I am calling from [facility].  I am calling to follow up and see how things are going for you after your recent emergency visit/hospital/TCU stay.\"    Tell me how you are doing now that you are home?\" Doing well.       Discharge Instructions    \"Let's review your discharge instructions.  What is/are the follow-up recommendations?  Pt. Response: w/ Cardio    \"Has an appointment with your primary care provider been scheduled?\"   Declined at this time.     \"When you see the provider, I would recommend that you bring your medications with you.\"    Medications    \"Tell me what changed about your medicines when you discharged?\"    Changes to chronic meds?    2 or more - Epic MTM referral needed    \"What questions do you have about your medications?\"    None     New diagnoses of heart failure, COPD, diabetes, or MI?    Yes - Care Coordination Referral needed. CC has reached out to patient.      On insulin: \"Did you start on insulin in the hospital or did you have your insulin dose changed?\"  No         Medication reconciliation completed? Yes  Was MTM referral placed (*Make sure to put transitions as reason for referral)?   No    Call Summary    \"What questions or concerns do you have about your recent visit and your follow-up care?\"     none    \"If you have questions or things don't continue to improve, we encourage you contact us through the main clinic number (give number).  Even if the clinic is not open, triage nurses are available 24/7 to help you.     We would like you to know that our clinic has extended hours (provide information).  We also have urgent care (provide details on closest location and hours/contact info)\"      \"Thank you for your time and take care!\"             "

## 2019-01-29 ENCOUNTER — TELEPHONE (OUTPATIENT)
Dept: FAMILY MEDICINE | Facility: CLINIC | Age: 69
End: 2019-01-29

## 2019-01-29 NOTE — PROGRESS NOTES
"Clinic Care Coordination Contact  [facility]/Voicemail    Clinical Data: Care Coordinator Outreach  Outreach attempted x 2.  Patient reports he is \"doing okay\". States he does not need an appointment with Primary Care Provider because he has an appointment with cardiology next week. Unaware of nephrology referral/follow up and reports he cannot talk right now because he is in the car riding with somebody. Checking weights which reports are \"just fine\", and blood sugars \"could be better\", again requested to end call. Encouraged patient to contact writer when available and informed him writer would try to reach him again, patient agreeable.   Plan: Care Coordinator will try to reach patient again next week.    Leann Wolf RN Care Coordinator  [facility] [place]  Email: [email or web address]  Phone: [phone]        "

## 2019-01-29 NOTE — TELEPHONE ENCOUNTER
MTM referral from: Transitions of Care (recent hospital discharge or ED visit)    MTM referral outreach attempt #2 on January 29, 2019 at 12:53 PM      Outcome: Patient not reachable after several attempts, will route to MTM Pharmacist/Provider as an FYI. Thank you for the referral.    Ade Whyte, MTM Coordinator      MTM Pharmacist at clinic where patient receives primary care-yes  Referred by a specialist-yes, MTM at speciality clinic-NA  Patient covered for MTM-Yes LAURA    Blocked MTM provider schedule-no

## 2019-02-05 ENCOUNTER — CARE COORDINATION (OUTPATIENT)
Dept: CARDIOLOGY | Facility: CLINIC | Age: 69
End: 2019-02-05

## 2019-02-05 ENCOUNTER — OFFICE VISIT (OUTPATIENT)
Dept: CARDIOLOGY | Facility: CLINIC | Age: 69
End: 2019-02-05
Payer: COMMERCIAL

## 2019-02-05 VITALS
BODY MASS INDEX: 28.38 KG/M2 | OXYGEN SATURATION: 98 % | SYSTOLIC BLOOD PRESSURE: 102 MMHG | WEIGHT: 233.1 LBS | HEART RATE: 80 BPM | DIASTOLIC BLOOD PRESSURE: 68 MMHG | HEIGHT: 76 IN

## 2019-02-05 DIAGNOSIS — N28.9 ACUTE KIDNEY INSUFFICIENCY: ICD-10-CM

## 2019-02-05 DIAGNOSIS — I25.119 CORONARY ARTERY DISEASE INVOLVING NATIVE CORONARY ARTERY OF NATIVE HEART WITH ANGINA PECTORIS (H): ICD-10-CM

## 2019-02-05 DIAGNOSIS — I48.20 CHRONIC ATRIAL FIBRILLATION (H): ICD-10-CM

## 2019-02-05 DIAGNOSIS — I42.9 CARDIOMYOPATHY (H): ICD-10-CM

## 2019-02-05 DIAGNOSIS — I25.5 ISCHEMIC CARDIOMYOPATHY: ICD-10-CM

## 2019-02-05 DIAGNOSIS — I50.23 ACUTE ON CHRONIC SYSTOLIC CONGESTIVE HEART FAILURE (H): ICD-10-CM

## 2019-02-05 DIAGNOSIS — I63.432 CEREBROVASCULAR ACCIDENT (CVA) DUE TO EMBOLISM OF LEFT POSTERIOR CEREBRAL ARTERY (H): ICD-10-CM

## 2019-02-05 DIAGNOSIS — I50.21 ACUTE SYSTOLIC HEART FAILURE (H): Primary | ICD-10-CM

## 2019-02-05 DIAGNOSIS — Z86.73 HISTORY OF CVA (CEREBROVASCULAR ACCIDENT): ICD-10-CM

## 2019-02-05 DIAGNOSIS — Z79.01 LONG TERM CURRENT USE OF ANTICOAGULANT THERAPY: ICD-10-CM

## 2019-02-05 DIAGNOSIS — R42 DIZZINESS: ICD-10-CM

## 2019-02-05 DIAGNOSIS — E87.6 HYPOKALEMIA: ICD-10-CM

## 2019-02-05 DIAGNOSIS — I10 HYPERTENSION GOAL BP (BLOOD PRESSURE) < 140/90: ICD-10-CM

## 2019-02-05 LAB
ANION GAP SERPL CALCULATED.3IONS-SCNC: 9 MMOL/L (ref 3–14)
BUN SERPL-MCNC: 37 MG/DL (ref 7–30)
CALCIUM SERPL-MCNC: 9 MG/DL (ref 8.5–10.1)
CHLORIDE SERPL-SCNC: 92 MMOL/L (ref 94–109)
CO2 SERPL-SCNC: 31 MMOL/L (ref 20–32)
CREAT SERPL-MCNC: 2.04 MG/DL (ref 0.66–1.25)
GFR SERPL CREATININE-BSD FRML MDRD: 32 ML/MIN/{1.73_M2}
GLUCOSE SERPL-MCNC: 362 MG/DL (ref 70–99)
POTASSIUM SERPL-SCNC: 3.1 MMOL/L (ref 3.4–5.3)
SODIUM SERPL-SCNC: 132 MMOL/L (ref 133–144)

## 2019-02-05 PROCEDURE — 99214 OFFICE O/P EST MOD 30 MIN: CPT | Mod: 24 | Performed by: INTERNAL MEDICINE

## 2019-02-05 PROCEDURE — 80048 BASIC METABOLIC PNL TOTAL CA: CPT | Performed by: INTERNAL MEDICINE

## 2019-02-05 PROCEDURE — 36415 COLL VENOUS BLD VENIPUNCTURE: CPT | Performed by: INTERNAL MEDICINE

## 2019-02-05 RX ORDER — CARVEDILOL 25 MG/1
25 TABLET ORAL 2 TIMES DAILY WITH MEALS
Qty: 180 TABLET | Refills: 3 | Status: SHIPPED | OUTPATIENT
Start: 2019-02-05 | End: 2019-02-22

## 2019-02-05 RX ORDER — POTASSIUM CHLORIDE 750 MG/1
10 TABLET, EXTENDED RELEASE ORAL 2 TIMES DAILY
Qty: 60 TABLET | Refills: 2 | Status: SHIPPED | OUTPATIENT
Start: 2019-02-05 | End: 2019-05-02

## 2019-02-05 ASSESSMENT — MIFFLIN-ST. JEOR: SCORE: 1928.58

## 2019-02-05 NOTE — LETTER
2/5/2019    Davion Cordova PA-C  [address] [address] [facility] [address]    RE: Jayro Elder       Dear Colleague,    I had the pleasure of seeing Jayro MAYURI Elder in the [facility].        HPI and Plan:   This complex 68-year-old gentleman is referred to heart failure clinic after a recent severe acute heart failure episode, likely considerably related to acute renal insufficiency.  He has a history of coronary artery disease with previous MI and stenting and chronic ejection fraction of 35-40%.  He has problems with peripheral wounds from his diabetes.  At the end of 2018 he had some gangrene in his foot and was on long term high-dose antibiotics.  He started to notice some swelling weight gain.  Following discharge she then re-presented with acute heart failure with orthopnea, dyspnea on exertion, severe edema.  He was found to have acute renal insufficiency with creatinine of greater than 3, with a baseline of around 1.  Aggressive diuresis was instituted and he states, and the hospital record intake indicate, he diuresed about 30 pounds.  Since discharge about 10 days ago his home weights are down another 5 pounds or so and are currently at 228 pounds.  During his hospitalization an echo showed no new wall motion abnormalities and troponins with continued moderately decreased ejection fraction.   He did not have any chest discomfort and is not having any now.  He is currently without dyspnea on exertion, orthopnea or PND, or edema.  Denies orthostasis but complains quite a bit of intermittent dizzy episodes where he feels so weak and poor that he has to lie down, sometimes for up to half an hour to feel better.  He is not had chest pain with this is not been aware of rapid heart rate and has not taken his blood pressure.  He had an episode earlier today but after it passed his blood pressure was 140 systolic.  He thinks his episodes are sometimes precipitated by reaching  up above into cabinets while he is looking up but is not aware if they occur when he just looks up without reaching upward.  Otherwise unaware of a positional component.  He is quite fatigued with any activity but has been hospitalized for a month essentially prior to this.     He has chronic atrial fibrillation.  There is a history of a CVA likely related to that.  He has been on anticoagulation with apixaban but also was continued on dual antiplatelet therapy prior to this hospitalization.  His last intervention was approximately a year and a half ago involving an obtuse marginal in June 2017 with a 3.0 size stent placed.  His other stents were widely patent at that time.      At the time of his most recent hospitalization with his acute renal insufficiency his insulin doses were changed due to the renal insufficiency, he was taken off of lisinopril but carvedilol and amlodipine were continued.  He was discharged still on 5 mg twice daily of apixaban but no clopidogrel, despite his severe renal insufficiency with a creatinine at discharge of over 3.  He has not had any bleeding episodes.  He does note that this week his glucoses when he checks a markedly higher, and in fact on his labs today is his glucose is about 360.      Exam is detailed below.  In summary:  Blood pressure 102/68.  Pulse 75-80 and irregularly irregular.  Home weight 228 pounds, clinic weight 233 pounds.  He was 268 pounds apparently at admission earlier in January.    Lungs-normal effort and clear  Cardiac-irregular rhythm.  No JVD or HJR.  No murmurs and heave.  Extremities-no edema.  Abdomen-soft nondistended no hepatomegaly    Laboratory studies today are pertinent for:  Creatinine 2.04 with BUN of 37 potassium 3.1.  Sodium is 132 but his glucose is 362 so he essentially has a normal sodium.  It is difficult to know if this creatinine would be better if he were quite so diuresed or if this is just a normal resolution of his acute renal  insufficiency.    Impression/plan    1-recent acute heart failure, likely primarily volume overload from his acute renal insufficiency.  She is completely diuresed by exam and symptoms.  In fact he may be a little bit over diuresed.  I will have him hold his furosemide today and tomorrow, and then use 40 mg daily as needed to keep his home weights between 230-232 pounds.  He will continue sodium restriction.  I will have him follow-up in a week with a basic metabolic panel in core clinic.    2-ischemic cardiomyopathy.  Stable at follow-up echo at recent hospitalization.    3-hypokalemia.  I will start potassium chloride 10 mEq twice daily.  Follow-up basic metabolic panel in 1 week.    4-severe acute renal insufficiency related to high-dose antibiotics and also  Diabetes likely.  This is improving.  However he still in the range where he likely should not be on 5 mg of apixaban twice daily.  For the next several weeks I will have him change to 2.5 mg twice daily until his renal function improves further.    5-hyperglycemia.  Likely with his improving renal function he is going to need better insulin dosing.  He is having trouble getting insulin.  My heart failure nurse is going to call Dr. Lua's office today and get them in contact with him  so they can start adjusting his insulin and getting him the appropriate insulin dosing and type.    6-chronic atrial fibrillation.  Rate controlled.  Otherwise asymptomatic.  On chronic anticoagulation.  The dosing of that should be adjusted as above.  I do not see a role for restarting clopidogrel at this time as long as he is on anticoagulation.    7-coronary artery disease.  No ischemic symptoms currently.  Last stent more than a year and a half ago.  We do not see a role for dual antiplatelet therapy continuation on top of his anticoagulation at this time and I would not restart the clopidogrel which was recently discontinued.  This was discussed with him.    8-dizziness.   Pattern is not typical of orthostasis.  I do think we need to evaluate his vital signs during this.  They have a blood pressure cuff at home and they will take a blood pressure cuff while he is standing sitting and lying during these episodes.  They do not really sound like a bradycardia or tachycardia arrhythmia but we will get his heart rate during these and that will be helpful.  Will reassess further evaluation if they continue after cutting back on his diuretics, and when we see what his vital signs are during.  The only started after his recent rapid aggressive diuresis.    In summary:  -Hold furosemide today and tomorrow, restarted 40 mg daily as needed to keep home weights 230-232 pounds  -Start potassium chloride 10 mg twice daily  -Follow-up in core clinic in 1 week with basic metabolic panel  -Blood pressure and rhythm/heart rate checks during his dizzy episodes  -Change apixaban to 2.5 mg twice daily until renal function improves then resume at 5 mg twice daily  -We will contact Dr. Lua's office to help facilitate better control of his diabetes as his renal function improves  -I renewed gave him appropriate prescriptions including doing his carvedilol.  -When his renal function continues to improve would then restart lisinopril as tolerated by his blood pressure.    Orders Placed This Encounter   Procedures     Basic metabolic panel     Follow-Up with [facility] visit       Orders Placed This Encounter   Medications     carvedilol (COREG) 25 MG tablet     Sig: Take 1 tablet (25 mg) by mouth 2 times daily (with meals)     Dispense:  180 tablet     Refill:  3     apixaban ANTICOAGULANT (ELIQUIS) 2.5 MG tablet     Sig: Take 1 tablet (2.5 mg) by mouth 2 times daily HOLD this medication until instructed to resume by your primary MD     Dispense:  60 tablet     Refill:  1     potassium chloride ER (K-DUR/KLOR-CON M) 10 MEQ CR tablet     Sig: Take 1 tablet (10 mEq) by mouth 2 times daily      Dispense:  60 tablet     Refill:  2       Medications Discontinued During This Encounter   Medication Reason     clopidogrel (PLAVIX) 75 MG tablet      carvedilol (COREG) 25 MG tablet Reorder     apixaban ANTICOAGULANT (ELIQUIS) 5 MG tablet Reorder         Encounter Diagnoses   Name Primary?     Ischemic cardiomyopathy      Acute systolic heart failure (H) Yes     Coronary artery disease involving native coronary artery of native heart with angina pectoris (H)      Chronic atrial fibrillation (H)      History of CVA (cerebrovascular accident)      Acute kidney insufficiency      Long term current use of anticoagulant therapy      Acute on chronic systolic congestive heart failure (H)      Hypertension goal BP (blood pressure) < 140/90      Cerebrovascular accident (CVA) due to embolism of left posterior cerebral artery (H)      Hypokalemia      Dizziness        CURRENT MEDICATIONS:  Current Outpatient Medications   Medication Sig Dispense Refill     amLODIPine (NORVASC) 5 MG tablet Take 0.5 tablets (2.5 mg) by mouth daily 15 tablet 0     apixaban ANTICOAGULANT (ELIQUIS) 2.5 MG tablet Take 1 tablet (2.5 mg) by mouth 2 times daily HOLD this medication until instructed to resume by your primary MD 60 tablet 1     atorvastatin (LIPITOR) 40 MG tablet Take 1 tablet (40 mg) by mouth every evening 30 tablet 0     carvedilol (COREG) 25 MG tablet Take 1 tablet (25 mg) by mouth 2 times daily (with meals) 180 tablet 3     furosemide (LASIX) 80 MG tablet Take 1 tablet (80 mg) by mouth 2 times daily 60 tablet 0     insulin glargine (LANTUS SOLOSTAR PEN) 100 UNIT/ML pen Inject 26 Units Subcutaneous every morning (before breakfast) 7.8 mL 0     insulin pen needle 31G X 6 MM Use  as directed. 100 each prn     isosorbide mononitrate (IMDUR) 30 MG 24 hr tablet Take 1 tablet (30 mg) by mouth daily 90 tablet 0     levothyroxine (SYNTHROID, LEVOTHROID) 137 MCG tablet Take 137 mcg by mouth At Bedtime  90 tablet 3     nitroglycerin  (NITROSTAT) 0.4 MG sublingual tablet Place 1 tablet (0.4 mg) under the tongue every 5 minutes as needed for chest pain 50 tablet 0     pantoprazole (PROTONIX) 40 MG enteric coated tablet Take 1 tablet (40 mg) by mouth every morning (before breakfast) 30 tablet 0     potassium chloride ER (K-DUR/KLOR-CON M) 10 MEQ CR tablet Take 1 tablet (10 mEq) by mouth 2 times daily 60 tablet 2     Cholecalciferol (VITAMIN D3) 2000 units TABS Take 1 tablet by mouth daily       insulin aspart (NOVOLOG FLEXPEN) 100 UNIT/ML pen Inject 8 Units Subcutaneous 3 times daily (with meals) USes about 7-10 units with meal depending (Patient not taking: Reported on 2/5/2019) 7.2 mL 0     lisinopril (PRINIVIL/ZESTRIL) 20 MG tablet Take 1 tablet (20 mg) by mouth 2 times daily HOLD this medication until instructed to resume per primary MD (holding until Cr is <1.0) (Patient not taking: Reported on 2/5/2019) 60 tablet 0     order for DME Equipment being ordered: Other: surgical shoe male XL  Treatment Diagnosis: sp right 2nd toe amputaion (Patient not taking: Reported on 2/5/2019) 1 Device 0     order for DME Equipment being ordered: Walker Wheels () and Walker ()  Treatment Diagnosis: Impaired gait, heel WB bilaterally. 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     Cardiomyopathy (H)      Cellulitis of left lower extremity 3/13/2018     Cerebral infarction (H)     eye sight decreased in peripheral of right side and blurry     Diabetic ulcer of left midfoot associated with type 2 diabetes mellitus, with fat layer exposed (H) 3/13/2018     Essential hypertension, benign 11/13/2002     Generalized osteoarthrosis, unspecified site 11/13/2002      Microalbuminuria 3/13/2018    X1     Myocardial infarction (H)      Neuropathy      Sepsis (H)      Sleep apnea     doesn't use it     Substance abuse (H)      Syncope, unspecified syncope type 3/13/2018     Thyroid disease     takes medicine     Tobacco use disorder 11/13/2002     Type 2 diabetes mellitus with diabetic peripheral angiopathy without gangrene, unspecified long term insulin use status 2005       PAST SURGICAL HISTORY:  Past Surgical History:   Procedure Laterality Date     AMPUTATE TOE(S) Right 1/6/2019    Procedure: Right open second toe partial amputation;  Surgeon: Sabino Garcia DPM;  Location: [facility] OR     AMPUTATE TOE(S) Right 1/8/2019    Procedure: 1.  Revision right second toe amputation with resection of distal half of proximal phalanx with full closure.    2.  Debridement of callus/preulcerative lesion, distal left second toe and plantar left first metatarsophalangeal joint.;  Surgeon: Ryan Bhagat DPM;  Location: [facility] OR     ANGIOGRAM  6/29/05    subtotal occ.mid LAD,SUSAN mid LAD     ANGIOGRAM  7/05    mild CAD,patent stent,no flow-limiting lesions,sev.LV dysf.LAD enlarged     ANGIOGRAM  2/09    Sev.single vessel disease,Mod LV dysf.distal LAD 90%,70-75% mid lad just before prev stent,SUSAN to prox.mid LAD, endeavor to distal LAD     ANGIOGRAM  11/13/13    restenosis, stent LAD     BACK SURGERY      back surgery x3     C NONSPECIFIC PROCEDURE      Laminectomy x 3 - (1983 x 2 & 1990)     C NONSPECIFIC PROCEDURE Bilateral 1998    Bunionectomy     C NONSPECIFIC PROCEDURE  1959    Gingival surgery at age 9     HEART CATH LEFT HEART CATH  06/13/2017    SUSAN to OM3     INCISION AND DRAINAGE TOE, COMBINED Bilateral 9/11/2018    Procedure: COMBINED INCISION AND DRAINAGE TOE;  1) Irrigation and debridement ulcer left great toe  2) Amputation 3rd toe right foot at distal interphalangeal joint level;  Surgeon: Miki Harp MD;  Location:  OR     ORTHOPEDIC SURGERY      bunion surgery  both feet     ORTHOPEDIC SURGERY      left shoulder     SOFT TISSUE SURGERY      debridement of toe numerous time     VASCULAR SURGERY      7 stents in heart       FAMILY HISTORY:  Family History   Problem Relation Age of Onset     Cancer - colorectal Sister      Thyroid Disease Brother      Cardiovascular Brother      Diabetes Brother      Cancer Father         tumor in chest and throat     Arthritis Mother      Thyroid Disease Mother      Diabetes Mother      Glaucoma No family hx of      Macular Degeneration No family hx of        SOCIAL HISTORY:  Social History     Socioeconomic History     Marital status:      Spouse name: None     Number of children: None     Years of education: None     Highest education level: None   Social Needs     Financial resource strain: None     Food insecurity - worry: None     Food insecurity - inability: None     Transportation needs - medical: None     Transportation needs - non-medical: None   Occupational History     None   Tobacco Use     Smoking status: Former Smoker     Packs/day: 1.00     Years: 25.00     Pack years: 25.00     Types: Cigarettes     Last attempt to quit: 2005     Years since quittin.5     Smokeless tobacco: Never Used   Substance and Sexual Activity     Alcohol use: Yes     Alcohol/week: 2.4 oz     Types: 4 Shots of liquor per week     Comment: almost every day     Drug use: No     Sexual activity: Yes     Partners: Female   Other Topics Concern     Parent/sibling w/ CABG, MI or angioplasty before 65F 55M? Yes      Service Not Asked     Blood Transfusions Not Asked     Caffeine Concern No     Comment: 3 cups daily     Occupational Exposure Not Asked     Hobby Hazards Not Asked     Sleep Concern Not Asked     Stress Concern Not Asked     Weight Concern Not Asked     Special Diet Yes     Comment: watching carb intake     Back Care Not Asked     Exercise No     Comment: some walking     Bike Helmet Not Asked     Seat Belt Not Asked      "Self-Exams Not Asked   Social History Narrative     None       Review of Systems:  Skin:  Negative       Eyes:  Positive for glasses    ENT:  Negative      Respiratory:  Negative       Cardiovascular:    dizziness;heaviness;fatigue;lightheadedness    Gastroenterology: Negative      Genitourinary:  Negative      Musculoskeletal:  Positive for arthritis    Neurologic:  Positive for headaches constant   Psychiatric:  Negative      Heme/Lymph/Imm:  Positive for easy bruising    Endocrine:  Positive for diabetes      Physical Exam:  Vitals: /68 (BP Location: Right arm, Patient Position: Sitting, Cuff Size: Adult Regular)   Pulse 80   Ht 1.93 m (6' 3.98\")   Wt 105.7 kg (233 lb 1.6 oz)   SpO2 98%   BMI 28.39 kg/m       Constitutional:  cooperative, alert and oriented, well developed, well nourished, in no acute distress        Skin:  warm and dry to the touch          Head:  normocephalic        Eyes:  pupils equal and round;sclera white;EOMS intact        Lymph:      ENT:  no pallor or cyanosis        Neck:  JVP normal;no carotid bruit(no HJR)        Respiratory:  clear to auscultation;normal symmetry;normal respiratory excursion         Cardiac: no murmurs, gallops or rubs detected;normal S1 and S2 irregularly irregular rhythm           mildly irregular  not assessed this visit                                        GI:  abdomen soft;no masses;non-tender;no HSM obese      Extremities and Muscular Skeletal:  no edema              Neurological:  no gross motor deficits        Psych:  affect appropriate, oriented to time, person and place        CC  No referring provider defined for this encounter.            Thank you for allowing me to participate in the care of your patient.      Sincerely,     Justin Tomlin MD     [facility]    cc:   No referring provider defined for this encounter.        "

## 2019-02-05 NOTE — PATIENT INSTRUCTIONS
No furosemide this afternoon or tomorrow.  Then take 40 mg daily as needed to keep home weight between 230-232 pounds    Change apixaban  (Elliquis) to 2.5 mg twice a day.    Check blood pressure and heart rate when get dizzy episode

## 2019-02-05 NOTE — PROGRESS NOTES
Dr Tomlin saw pt in clinic today.   5. hyperglycemia.  Likely with his improving renal function he is going to need better insulin dosing.  He is having trouble getting insulin.  My heart failure nurse is going to call Dr. Lua's office today and get them in contact with him  so they can start adjusting his insulin and getting him the appropriate insulin dosing and type.      Writer called to Dr Lua's office, left message for his nurse and faxed today's progress note and recent BMPs.     Princess Chapa RN 2:48 PM 02/05/19

## 2019-02-05 NOTE — PROGRESS NOTES
HPI and Plan:   This complex 68-year-old gentleman is referred to heart failure clinic after a recent severe acute heart failure episode, likely considerably related to acute renal insufficiency.  He has a history of coronary artery disease with previous MI and stenting and chronic ejection fraction of 35-40%.  He has problems with peripheral wounds from his diabetes.  At the end of 2018 he had some gangrene in his foot and was on long term high-dose antibiotics.  He started to notice some swelling weight gain.  Following discharge she then re-presented with acute heart failure with orthopnea, dyspnea on exertion, severe edema.  He was found to have acute renal insufficiency with creatinine of greater than 3, with a baseline of around 1.  Aggressive diuresis was instituted and he states, and the hospital record intake indicate, he diuresed about 30 pounds.  Since discharge about 10 days ago his home weights are down another 5 pounds or so and are currently at 228 pounds.  During his hospitalization an echo showed no new wall motion abnormalities and troponins with continued moderately decreased ejection fraction.   He did not have any chest discomfort and is not having any now.  He is currently without dyspnea on exertion, orthopnea or PND, or edema.  Denies orthostasis but complains quite a bit of intermittent dizzy episodes where he feels so weak and poor that he has to lie down, sometimes for up to half an hour to feel better.  He is not had chest pain with this is not been aware of rapid heart rate and has not taken his blood pressure.  He had an episode earlier today but after it passed his blood pressure was 140 systolic.  He thinks his episodes are sometimes precipitated by reaching up above into cabinets while he is looking up but is not aware if they occur when he just looks up without reaching upward.  Otherwise unaware of a positional component.  He is quite fatigued with any activity but has been  hospitalized for a month essentially prior to this.     He has chronic atrial fibrillation.  There is a history of a CVA likely related to that.  He has been on anticoagulation with apixaban but also was continued on dual antiplatelet therapy prior to this hospitalization.  His last intervention was approximately a year and a half ago involving an obtuse marginal in June 2017 with a 3.0 size stent placed.  His other stents were widely patent at that time.      At the time of his most recent hospitalization with his acute renal insufficiency his insulin doses were changed due to the renal insufficiency, he was taken off of lisinopril but carvedilol and amlodipine were continued.  He was discharged still on 5 mg twice daily of apixaban but no clopidogrel, despite his severe renal insufficiency with a creatinine at discharge of over 3.  He has not had any bleeding episodes.  He does note that this week his glucoses when he checks a markedly higher, and in fact on his labs today is his glucose is about 360.      Exam is detailed below.  In summary:  Blood pressure 102/68.  Pulse 75-80 and irregularly irregular.  Home weight 228 pounds, clinic weight 233 pounds.  He was 268 pounds apparently at admission earlier in January.    Lungs-normal effort and clear  Cardiac-irregular rhythm.  No JVD or HJR.  No murmurs and heave.  Extremities-no edema.  Abdomen-soft nondistended no hepatomegaly    Laboratory studies today are pertinent for:  Creatinine 2.04 with BUN of 37 potassium 3.1.  Sodium is 132 but his glucose is 362 so he essentially has a normal sodium.  It is difficult to know if this creatinine would be better if he were quite so diuresed or if this is just a normal resolution of his acute renal insufficiency.    Impression/plan    1-recent acute heart failure, likely primarily volume overload from his acute renal insufficiency.  She is completely diuresed by exam and symptoms.  In fact he may be a little bit over  diuresed.  I will have him hold his furosemide today and tomorrow, and then use 40 mg daily as needed to keep his home weights between 230-232 pounds.  He will continue sodium restriction.  I will have him follow-up in a week with a basic metabolic panel in [facility] clinic.    2-ischemic cardiomyopathy.  Stable at follow-up echo at recent hospitalization.    3-hypokalemia.  I will start potassium chloride 10 mEq twice daily.  Follow-up basic metabolic panel in 1 week.    4-severe acute renal insufficiency related to high-dose antibiotics and also  Diabetes likely.  This is improving.  However he still in the range where he likely should not be on 5 mg of apixaban twice daily.  For the next several weeks I will have him change to 2.5 mg twice daily until his renal function improves further.    5-hyperglycemia.  Likely with his improving renal function he is going to need better insulin dosing.  He is having trouble getting insulin.  My heart failure nurse is going to call Dr. Lua's office today and get them in contact with him  so they can start adjusting his insulin and getting him the appropriate insulin dosing and type.    6-chronic atrial fibrillation.  Rate controlled.  Otherwise asymptomatic.  On chronic anticoagulation.  The dosing of that should be adjusted as above.  I do not see a role for restarting clopidogrel at this time as long as he is on anticoagulation.    7-coronary artery disease.  No ischemic symptoms currently.  Last stent more than a year and a half ago.  We do not see a role for dual antiplatelet therapy continuation on top of his anticoagulation at this time and I would not restart the clopidogrel which was recently discontinued.  This was discussed with him.    8-dizziness.  Pattern is not typical of orthostasis.  I do think we need to evaluate his vital signs during this.  They have a blood pressure cuff at home and they will take a blood pressure cuff while he is standing sitting and lying  during these episodes.  They do not really sound like a bradycardia or tachycardia arrhythmia but we will get his heart rate during these and that will be helpful.  Will reassess further evaluation if they continue after cutting back on his diuretics, and when we see what his vital signs are during.  The only started after his recent rapid aggressive diuresis.    In summary:  -Hold furosemide today and tomorrow, restarted 40 mg daily as needed to keep home weights 230-232 pounds  -Start potassium chloride 10 mg twice daily  -Follow-up in [facility] clinic in 1 week with basic metabolic panel  -Blood pressure and rhythm/heart rate checks during his dizzy episodes  -Change apixaban to 2.5 mg twice daily until renal function improves then resume at 5 mg twice daily  -We will contact Dr. Lua's office to help facilitate better control of his diabetes as his renal function improves  -I renewed gave him appropriate prescriptions including doing his carvedilol.  -When his renal function continues to improve would then restart lisinopril as tolerated by his blood pressure.    Orders Placed This Encounter   Procedures     Basic metabolic panel     [facility] with [facility] visit       Orders Placed This Encounter   Medications     carvedilol (COREG) 25 MG tablet     Sig: Take 1 tablet (25 mg) by mouth 2 times daily (with meals)     Dispense:  180 tablet     Refill:  3     apixaban ANTICOAGULANT (ELIQUIS) 2.5 MG tablet     Sig: Take 1 tablet (2.5 mg) by mouth 2 times daily HOLD this medication until instructed to resume by your primary MD     Dispense:  60 tablet     Refill:  1     potassium chloride ER (K-DUR/KLOR-CON M) 10 MEQ CR tablet     Sig: Take 1 tablet (10 mEq) by mouth 2 times daily     Dispense:  60 tablet     Refill:  2       Medications Discontinued During This Encounter   Medication Reason     clopidogrel (PLAVIX) 75 MG tablet      carvedilol (COREG) 25 MG tablet Reorder     apixaban ANTICOAGULANT (ELIQUIS) 5  MG tablet Reorder         Encounter Diagnoses   Name Primary?     Ischemic cardiomyopathy      Acute systolic heart failure (H) Yes     Coronary artery disease involving native coronary artery of native heart with angina pectoris (H)      Chronic atrial fibrillation (H)      History of CVA (cerebrovascular accident)      Acute kidney insufficiency      Long term current use of anticoagulant therapy      Acute on chronic systolic congestive heart failure (H)      Hypertension goal BP (blood pressure) < 140/90      Cerebrovascular accident (CVA) due to embolism of left posterior cerebral artery (H)      Hypokalemia      Dizziness        CURRENT MEDICATIONS:  Current Outpatient Medications   Medication Sig Dispense Refill     amLODIPine (NORVASC) 5 MG tablet Take 0.5 tablets (2.5 mg) by mouth daily 15 tablet 0     apixaban ANTICOAGULANT (ELIQUIS) 2.5 MG tablet Take 1 tablet (2.5 mg) by mouth 2 times daily HOLD this medication until instructed to resume by your primary MD 60 tablet 1     atorvastatin (LIPITOR) 40 MG tablet Take 1 tablet (40 mg) by mouth every evening 30 tablet 0     carvedilol (COREG) 25 MG tablet Take 1 tablet (25 mg) by mouth 2 times daily (with meals) 180 tablet 3     furosemide (LASIX) 80 MG tablet Take 1 tablet (80 mg) by mouth 2 times daily 60 tablet 0     insulin glargine (LANTUS SOLOSTAR PEN) 100 UNIT/ML pen Inject 26 Units Subcutaneous every morning (before breakfast) 7.8 mL 0     insulin pen needle 31G X 6 MM Use  as directed. 100 each prn     isosorbide mononitrate (IMDUR) 30 MG 24 hr tablet Take 1 tablet (30 mg) by mouth daily 90 tablet 0     levothyroxine (SYNTHROID, LEVOTHROID) 137 MCG tablet Take 137 mcg by mouth At Bedtime  90 tablet 3     nitroglycerin (NITROSTAT) 0.4 MG sublingual tablet Place 1 tablet (0.4 mg) under the tongue every 5 minutes as needed for chest pain 50 tablet 0     pantoprazole (PROTONIX) 40 MG enteric coated tablet Take 1 tablet (40 mg) by mouth every morning  (before breakfast) 30 tablet 0     potassium chloride ER (K-DUR/KLOR-CON M) 10 MEQ CR tablet Take 1 tablet (10 mEq) by mouth 2 times daily 60 tablet 2     Cholecalciferol (VITAMIN D3) 2000 units TABS Take 1 tablet by mouth daily       insulin aspart (NOVOLOG FLEXPEN) 100 UNIT/ML pen Inject 8 Units Subcutaneous 3 times daily (with meals) USes about 7-10 units with meal depending (Patient not taking: Reported on 2/5/2019) 7.2 mL 0     lisinopril (PRINIVIL/ZESTRIL) 20 MG tablet Take 1 tablet (20 mg) by mouth 2 times daily HOLD this medication until instructed to resume per primary MD (holding until Cr is <1.0) (Patient not taking: Reported on 2/5/2019) 60 tablet 0     order for DME Equipment being ordered: Other: surgical shoe male XL  Treatment Diagnosis: sp right 2nd toe amputaion (Patient not taking: Reported on 2/5/2019) 1 Device 0     order for DME Equipment being ordered: Walker Wheels () and Walker ()  Treatment Diagnosis: Impaired gait, heel WB bilaterally. 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     Cardiomyopathy (H)      Cellulitis of left lower extremity 3/13/2018     Cerebral infarction (H)     eye sight decreased in peripheral of right side and blurry     Diabetic ulcer of left midfoot associated with type 2 diabetes mellitus, with fat layer exposed (H) 3/13/2018     Essential hypertension, benign 11/13/2002     Generalized osteoarthrosis, unspecified site 11/13/2002     Microalbuminuria 3/13/2018    X1     Myocardial infarction (H)      Neuropathy      Sepsis (H)      Sleep apnea     doesn't use it     Substance abuse (H)      Syncope, unspecified syncope type 3/13/2018     Thyroid disease     takes  medicine     Tobacco use disorder 11/13/2002     Type 2 diabetes mellitus with diabetic peripheral angiopathy without gangrene, unspecified long term insulin use status 2005       PAST SURGICAL HISTORY:  Past Surgical History:   Procedure Laterality Date     AMPUTATE TOE(S) Right 1/6/2019    Procedure: Right open second toe partial amputation;  Surgeon: Sabino Garcia DPM;  Location: [facility] OR     AMPUTATE TOE(S) Right 1/8/2019    Procedure: 1.  Revision right second toe amputation with resection of distal half of proximal phalanx with full closure.    2.  Debridement of callus/preulcerative lesion, distal left second toe and plantar left first metatarsophalangeal joint.;  Surgeon: Ryan Bhagat DPM;  Location: [facility] OR     ANGIOGRAM  6/29/05    subtotal occ.mid LAD,SUSAN mid LAD     ANGIOGRAM  7/05    mild CAD,patent stent,no flow-limiting lesions,sev.LV dysf.LAD enlarged     ANGIOGRAM  2/09    Sev.single vessel disease,Mod LV dysf.distal LAD 90%,70-75% mid lad just before prev stent,SUSAN to prox.mid LAD, endeavor to distal LAD     ANGIOGRAM  11/13/13    restenosis, stent LAD     BACK SURGERY      back surgery x3     C NONSPECIFIC PROCEDURE      Laminectomy x 3 - (1983 x 2 & 1990)     C NONSPECIFIC PROCEDURE Bilateral 1998    Bunionectomy     C NONSPECIFIC PROCEDURE  1959    Gingival surgery at age 9     HEART CATH LEFT HEART CATH  06/13/2017    SUSAN to OM3     INCISION AND DRAINAGE TOE, COMBINED Bilateral 9/11/2018    Procedure: COMBINED INCISION AND DRAINAGE TOE;  1) Irrigation and debridement ulcer left great toe  2) Amputation 3rd toe right foot at distal interphalangeal joint level;  Surgeon: Miki Harp MD;  Location: [facility] OR     ORTHOPEDIC SURGERY      bunion surgery both feet     ORTHOPEDIC SURGERY      left shoulder     SOFT TISSUE SURGERY      debridement of toe numerous time     VASCULAR SURGERY      7 stents in heart       FAMILY HISTORY:  Family History   Problem Relation Age of Onset      Cancer - colorectal Sister      Thyroid Disease Brother      Cardiovascular Brother      Diabetes Brother      Cancer Father         tumor in chest and throat     Arthritis Mother      Thyroid Disease Mother      Diabetes Mother      Glaucoma No family hx of      Macular Degeneration No family hx of        SOCIAL HISTORY:  Social History     Socioeconomic History     Marital status:      Spouse name: None     Number of children: None     Years of education: None     Highest education level: None   Social Needs     Financial resource strain: None     Food insecurity - worry: None     Food insecurity - inability: None     Transportation needs - medical: None     Transportation needs - non-medical: None   Occupational History     None   Tobacco Use     Smoking status: Former Smoker     Packs/day: 1.00     Years: 25.00     Pack years: 25.00     Types: Cigarettes     Last attempt to quit: 2005     Years since quittin.5     Smokeless tobacco: Never Used   Substance and Sexual Activity     Alcohol use: Yes     Alcohol/week: 2.4 oz     Types: 4 Shots of liquor per week     Comment: almost every day     Drug use: No     Sexual activity: Yes     Partners: Female   Other Topics Concern     Parent/sibling w/ CABG, MI or angioplasty before 65F 55M? Yes      Service Not Asked     Blood Transfusions Not Asked     Caffeine Concern No     Comment: 3 cups daily     Occupational Exposure Not Asked     Hobby Hazards Not Asked     Sleep Concern Not Asked     Stress Concern Not Asked     Weight Concern Not Asked     Special Diet Yes     Comment: watching carb intake     Back Care Not Asked     Exercise No     Comment: some walking     Bike Helmet Not Asked     Seat Belt Not Asked     Self-Exams Not Asked   Social History Narrative     None       Review of Systems:  Skin:  Negative       Eyes:  Positive for glasses    ENT:  Negative      Respiratory:  Negative       Cardiovascular:     "dizziness;heaviness;fatigue;lightheadedness    Gastroenterology: Negative      Genitourinary:  Negative      Musculoskeletal:  Positive for arthritis    Neurologic:  Positive for headaches constant   Psychiatric:  Negative      Heme/Lymph/Imm:  Positive for easy bruising    Endocrine:  Positive for diabetes      Physical Exam:  Vitals: /68 (BP Location: Right arm, Patient Position: Sitting, Cuff Size: Adult Regular)   Pulse 80   Ht 1.93 m (6' 3.98\")   Wt 105.7 kg (233 lb 1.6 oz)   SpO2 98%   BMI 28.39 kg/m      Constitutional:  cooperative, alert and oriented, well developed, well nourished, in no acute distress        Skin:  warm and dry to the touch          Head:  normocephalic        Eyes:  pupils equal and round;sclera white;EOMS intact        Lymph:      ENT:  no pallor or cyanosis        Neck:  JVP normal;no carotid bruit(no HJR)        Respiratory:  clear to auscultation;normal symmetry;normal respiratory excursion         Cardiac: no murmurs, gallops or rubs detected;normal S1 and S2 irregularly irregular rhythm           mildly irregular  not assessed this visit                                        GI:  abdomen soft;no masses;non-tender;no HSM obese      Extremities and Muscular Skeletal:  no edema              Neurological:  no gross motor deficits        Psych:  affect appropriate, oriented to time, person and place        CC  No referring provider defined for this encounter.              "

## 2019-02-06 ENCOUNTER — PATIENT OUTREACH (OUTPATIENT)
Dept: CARE COORDINATION | Facility: CLINIC | Age: 69
End: 2019-02-06

## 2019-02-06 NOTE — PROGRESS NOTES
"Clinic Care Coordination Contact  OUTREACH    Chief Complaint   Patient presents with     Clinic Care Coordination - Follow-up     CHF        Universal Utilization:   Utilization    Last refreshed: 2/5/2019  9:11 PM:  Hospital Admissions 4           Last refreshed: 2/5/2019  9:11 PM:  ED Visits 0           Last refreshed: 2/5/2019  9:11 PM:  No Show Count (past year) 2              Current as of: 2/5/2019  9:11 PM            Clinical Concerns:  Current Medical Concerns:  CHF. Patient reports he is \"fine\". Established with [facility] and seen by cardiology yesterday.   Current Behavioral Concerns: Patient unengaged and unwilling to talk with CC. Unwilling to schedule Primary Care Provider follow up at this time.   Education Provided to patient: CC role. Encouraged use of clinic, clinic after hours, same day appointments, urgent care utilization.      Medication Management:  Patient independent in management of his medications. Reviewed medication changes Eliquis, and Potassium Chloride with patient who verbalized understanding.     Transportation:   Patient reports he will  his medications when he makes it to the drugstore. Plows left snow in his driveway and he can't see to drive anyways. Declined further discussion.      Psychosocial:  Financial/Insurance:   Patient replied \"that's my own personal business thank you\" when asked if there were any financial concerns writer could help provide support for him with.      Patient/Caregiver understanding: Patient verbalized understanding and denies any additional questions or concerns at this time. RNCC engaged in [employer] communications during encounter.      Future Appointments              In 1 week [facility]    In 1 week Gill Doty PA [facility]          Plan: At this time, patient denies outstanding need for connection or referral to " resources or assistance navigating recommended follow up care. No further Care Coordination outreaches scheduled at this time, patient provided with this writer's number and encouraged to call with future needs or questions.    Leann Wolf RN Care Coordinator  [facility] [place]  Email: [email or web address]  Phone: [phone]

## 2019-02-14 ENCOUNTER — OFFICE VISIT (OUTPATIENT)
Dept: CARDIOLOGY | Facility: CLINIC | Age: 69
End: 2019-02-14
Attending: INTERNAL MEDICINE
Payer: COMMERCIAL

## 2019-02-14 VITALS
SYSTOLIC BLOOD PRESSURE: 86 MMHG | HEART RATE: 72 BPM | DIASTOLIC BLOOD PRESSURE: 58 MMHG | WEIGHT: 242.3 LBS | OXYGEN SATURATION: 97 % | BODY MASS INDEX: 29.51 KG/M2 | HEIGHT: 76 IN

## 2019-02-14 DIAGNOSIS — I50.21 ACUTE SYSTOLIC HEART FAILURE (H): ICD-10-CM

## 2019-02-14 DIAGNOSIS — N28.9 ACUTE KIDNEY INSUFFICIENCY: ICD-10-CM

## 2019-02-14 DIAGNOSIS — E87.6 HYPOKALEMIA: ICD-10-CM

## 2019-02-14 DIAGNOSIS — I50.22 CHRONIC SYSTOLIC CONGESTIVE HEART FAILURE (H): Primary | ICD-10-CM

## 2019-02-14 DIAGNOSIS — I50.23 ACUTE ON CHRONIC SYSTOLIC CONGESTIVE HEART FAILURE (H): Primary | ICD-10-CM

## 2019-02-14 LAB
ANION GAP SERPL CALCULATED.3IONS-SCNC: 8 MMOL/L (ref 3–14)
BUN SERPL-MCNC: 32 MG/DL (ref 7–30)
CALCIUM SERPL-MCNC: 8.6 MG/DL (ref 8.5–10.1)
CHLORIDE SERPL-SCNC: 101 MMOL/L (ref 94–109)
CO2 SERPL-SCNC: 28 MMOL/L (ref 20–32)
CREAT SERPL-MCNC: 1.83 MG/DL (ref 0.66–1.25)
GFR SERPL CREATININE-BSD FRML MDRD: 37 ML/MIN/{1.73_M2}
GLUCOSE SERPL-MCNC: 227 MG/DL (ref 70–99)
POTASSIUM SERPL-SCNC: 3.7 MMOL/L (ref 3.4–5.3)
SODIUM SERPL-SCNC: 137 MMOL/L (ref 133–144)

## 2019-02-14 PROCEDURE — 99214 OFFICE O/P EST MOD 30 MIN: CPT | Performed by: PHYSICIAN ASSISTANT

## 2019-02-14 PROCEDURE — 80048 BASIC METABOLIC PNL TOTAL CA: CPT | Performed by: INTERNAL MEDICINE

## 2019-02-14 PROCEDURE — 36415 COLL VENOUS BLD VENIPUNCTURE: CPT | Performed by: INTERNAL MEDICINE

## 2019-02-14 RX ORDER — FUROSEMIDE 40 MG
40 TABLET ORAL DAILY
Qty: 90 TABLET | Refills: 3
Start: 2019-02-14 | End: 2019-03-07

## 2019-02-14 ASSESSMENT — MIFFLIN-ST. JEOR: SCORE: 1970.57

## 2019-02-14 NOTE — LETTER
"2/14/2019    Davion Cordova PA-C  [id number] [facility] [address]    RE: Jayro Ortegazahra       Dear Colleague,    I had the pleasure of seeing Jayro Elder in the [facility].          Cardiology Progress Note    Date of Service: 02/14/2019      Reason for visit: [facility] clinic follow up, acute systolic heart failure.    Primary cardiologist: Dr. Justin Tomlin      HPI:  Mr. Elder is a pleasant 68 year old male previously followed by Dr. Mckeon, with a PMHx including DM2, CVA, untreated MARISOL, and renal dysfunction. From a cardiac standpoint, he has chronic afib on AC, and known coronary disease with previous MI/stenting and ischemic CMO with chronic ejection fraction of 35-40%. He has problems with peripheral wounds from his diabetes and recent gangrene of his foot. He was admitted in early Jan 2019 for osteomyelitis. This stay was complicated by ABNER with IV antibiotics and his chronic clopidogrel was stopped. He then presented back shortly after, with weight gain and ANDREWS. He was again hospitalized and treated for a CHF exacerbation. He was not on diuresis prior to that admission. During that stay he was diuresed ~30 pounds. Echo during that stay showed no new WMAs and EF remained in the 35-40% range.     He then saw Dr. Tomlin for a CORE MD evaluation about a week ago. At that time his weight was down an additional 5 lbs from discharge, and he was noting weakness and dizzy spells. At that visit, he was felt over diuresed, and he asked him to hold his scheduled Lasix x 2 days and resume at 40mg daily as needed with a goal weight of 230-232 lbs. He's back today for short term follow up with me here in the [facility] clinic.    Since last visit, Mr. Elder's weight is back up slightly at 237 on his home scale. As directed, he did hold x 2 days and has since been taking 40mg of lasix daily. He forgot his weight log, but states they are \"creeping up.\" Despite this, he denies " "worsening leg edema or ANDREWS. His labs today show an overall improvement in his renal function, with BUN/SCr now down to 32/1.83 respectively. He hasn't been check ing his BP at home because he hasn't had a \"spell\" since he was here last, and states he's overall improved in this regard. However, he is mildly lightheaded today, and BP is low at 86/58. He describes this as an ongoing \"pressure or headache\" at times. He denies syncope/presyncope. He denies chest \"pain\" but says he almost constantly is \"aware of\" a feeling in his mid chest and neck. He tells me this is chronic and unchanged. He has rare palpitations and known afib. He saw Dr. Lua last week and his DM medications have been adjusted, however he states his BS are still running high, sometimes in the 200-300 range. Glucose today on labs was 227.       ASSESSMENT/PLAN:    1. Acute on chronic systolic heart failure, known ischemic cardiomyopathy.   --Known chronic EF 35-40%, not significantly changed per echo with recent admit Jan 2019.    --Recent exacerbation requiring aggressive diuresis of ~30 lbs. Baseline weight is still a bit difficult, but may be in the 235-237 range based on symptoms and renal function. He is now on lasix 40mg daily which we will continue, along with current dose of Kdur. I've asked him to continue daily weights at home and bring his log to next visit. I encouraged dietary sodium restriction.    --Continue carvedilol 25mg BID. His lisinopril is still being held due to renal dysfunction, which we would like to resume if/when possible. He does not have an appt with nephrology yet that he is aware of. I've asked our staff to investigate whether a formal referral has been placed.   --I also discussed how ongoing poor DM control can contribute to cardiac dysfunction. If his BS do not improve with his current sliding scale I've asked him to call Dr. Lua's office for an update.     2. Chronic atrial fibrillation.   --Appears to be " chronically rate controlled, asymptomatic. On carvedilol as above.   --Continue Eliquis, currently on 2.5mg BID due to renal issues, though also likely can go back up in the near future if renal function stabilizes.     3. Coronary artery disease.   --No ischemic symptoms, though does have a chronic chest discomfort. Last intervention June 2017 involving an obtuse marginal with stent placement. Other stents were widely patent at that time.    --He is now no longer on clopidogrel, though may consider resuming ASA with his Eliquis. Will d/w Dr. Tomlin.   --Continue atorvastatin 40mg at bedtime. Last LDL under excellent control at 33 Jan 2019.    --He is chronically on Imdur, I believe due to the chronic chest discomfort, though this is somewhat atypical pain. Will also d/w Dr. Tomlin whether this could be discontinued in the future if we are able to resume an ACE-I.          [place] plan[place] [facility] [place], BP monitoring.       Orders this Visit:  Orders Placed This Encounter   Procedures     [place] with [facility] [place] visit     Orders Placed This Encounter   Medications     furosemide (LASIX) 40 MG tablet     Sig: Take 1 tablet (40 mg) by mouth daily     Dispense:  90 tablet     Refill:  3     Medications Discontinued During This Encounter   Medication Reason     amLODIPine (NORVASC) 5 MG tablet Stop at Discharge     furosemide (LASIX) 80 MG tablet Dose adjustment     lisinopril (PRINIVIL/ZESTRIL) 20 MG tablet Discontinued by another Health Care Provider           CURRENT MEDICATIONS:  Current Outpatient Medications   Medication Sig Dispense Refill     apixaban ANTICOAGULANT (ELIQUIS) 2.5 MG tablet Take 1 tablet (2.5 mg) by mouth 2 times daily HOLD this medication until instructed to resume by your primary MD 60 tablet 1     atorvastatin (LIPITOR) 40 MG tablet Take 1 tablet (40 mg) by mouth every evening 30 tablet 0     carvedilol (COREG) 25 MG tablet Take 1 tablet (25 mg)  by mouth 2 times daily (with meals) 180 tablet 3     Cholecalciferol (VITAMIN D3) 2000 units TABS Take 1 tablet by mouth daily       furosemide (LASIX) 40 MG tablet Take 1 tablet (40 mg) by mouth daily 90 tablet 3     insulin aspart (NOVOLOG FLEXPEN) 100 UNIT/ML pen Inject 8 Units Subcutaneous 3 times daily (with meals) USes about 7-10 units with meal depending 7.2 mL 0     insulin glargine (LANTUS SOLOSTAR PEN) 100 UNIT/ML pen Inject 26 Units Subcutaneous every morning (before breakfast) (Patient taking differently: Inject 36 Units Subcutaneous every morning (before breakfast) ) 7.8 mL 0     insulin pen needle 31G X 6 MM Use  as directed. 100 each prn     isosorbide mononitrate (IMDUR) 30 MG 24 hr tablet Take 1 tablet (30 mg) by mouth daily 90 tablet 0     levothyroxine (SYNTHROID, LEVOTHROID) 137 MCG tablet Take 137 mcg by mouth At Bedtime  90 tablet 3     pantoprazole (PROTONIX) 40 MG enteric coated tablet Take 1 tablet (40 mg) by mouth every morning (before breakfast) 30 tablet 0     potassium chloride ER (K-DUR/KLOR-CON M) 10 MEQ CR tablet Take 1 tablet (10 mEq) by mouth 2 times daily 60 tablet 2     nitroglycerin (NITROSTAT) 0.4 MG sublingual tablet Place 1 tablet (0.4 mg) under the tongue every 5 minutes as needed for chest pain (Patient not taking: Reported on 2/14/2019) 50 tablet 0     order for DME Equipment being ordered: Other: surgical shoe male XL  Treatment Diagnosis: sp right 2nd toe amputaion (Patient not taking: Reported on 2/5/2019) 1 Device 0     order for DME Equipment being ordered: Walker Wheels () and Walker ()  Treatment Diagnosis: Impaired gait, heel WB bilaterally. 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     Cardiomyopathy (H)      Cellulitis of left lower extremity 3/13/2018     Cerebral infarction (H)     eye sight decreased in peripheral of right side and blurry     Diabetic ulcer of left midfoot associated with type 2 diabetes mellitus, with fat layer exposed (H) 3/13/2018     Essential hypertension, benign 11/13/2002     Generalized osteoarthrosis, unspecified site 11/13/2002     Microalbuminuria 3/13/2018    X1     Myocardial infarction (H)      Neuropathy      Sepsis (H)      Sleep apnea     doesn't use it     Substance abuse (H)      Syncope, unspecified syncope type 3/13/2018     Thyroid disease     takes medicine     Tobacco use disorder 11/13/2002     Type 2 diabetes mellitus with diabetic peripheral angiopathy without gangrene, unspecified long term insulin use status 2005       PAST SURGICAL HISTORY:  Past Surgical History:   Procedure Laterality Date     AMPUTATE TOE(S) Right 1/6/2019    Procedure: Right open second toe partial amputation;  Surgeon: Sabino Garcia DPM;  Location: [facility] OR     AMPUTATE TOE(S) Right 1/8/2019    Procedure: 1.  Revision right second toe amputation with resection of distal half of proximal phalanx with full closure.    2.  Debridement of callus/preulcerative lesion, distal left second toe and plantar left first metatarsophalangeal joint.;  Surgeon: Ryan Bhagat DPM;  Location: [facility] OR     ANGIOGRAM  6/29/05    subtotal occ.mid LAD,SUSAN mid LAD     ANGIOGRAM  7/05    mild CAD,patent stent,no flow-limiting lesions,sev.LV dysf.LAD enlarged     ANGIOGRAM  2/09    Sev.single vessel disease,Mod LV dysf.distal LAD 90%,70-75% mid lad just before prev stent,SUSAN to prox.mid LAD, endeavor to distal LAD     ANGIOGRAM  11/13/13    restenosis, stent LAD     BACK SURGERY      back surgery x3     C NONSPECIFIC PROCEDURE      Laminectomy x 3 - (1983 x 2 & 1990)     C NONSPECIFIC PROCEDURE Bilateral 1998    Bunionectomy     C NONSPECIFIC PROCEDURE  1959     Gingival surgery at age 9     HEART CATH LEFT HEART CATH  2017    SUSAN to OM3     INCISION AND DRAINAGE TOE, COMBINED Bilateral 2018    Procedure: COMBINED INCISION AND DRAINAGE TOE;  1) Irrigation and debridement ulcer left great toe  2) Amputation 3rd toe right foot at distal interphalangeal joint level;  Surgeon: Miki Harp MD;  Location: [facility] OR     ORTHOPEDIC SURGERY      bunion surgery both feet     ORTHOPEDIC SURGERY      left shoulder     SOFT TISSUE SURGERY      debridement of toe numerous time     VASCULAR SURGERY      7 stents in heart       FAMILY HISTORY:  Family History   Problem Relation Age of Onset     Cancer - colorectal Sister      Thyroid Disease Brother      Cardiovascular Brother      Diabetes Brother      Cancer Father         tumor in chest and throat     Arthritis Mother      Thyroid Disease Mother      Diabetes Mother      Glaucoma No family hx of      Macular Degeneration No family hx of        SOCIAL HISTORY:  Social History     Socioeconomic History     Marital status:      Spouse name: None     Number of children: None     Years of education: None     Highest education level: None   Social Needs     Financial resource strain: None     Food insecurity - worry: None     Food insecurity - inability: None     Transportation needs - medical: None     Transportation needs - non-medical: None   Occupational History     None   Tobacco Use     Smoking status: Former Smoker     Packs/day: 1.00     Years: 25.00     Pack years: 25.00     Types: Cigarettes     Last attempt to quit: 2005     Years since quittin.5     Smokeless tobacco: Never Used   Substance and Sexual Activity     Alcohol use: Yes     Alcohol/week: 2.4 oz     Types: 4 Shots of liquor per week     Comment: almost every day     Drug use: No     Sexual activity: Yes     Partners: Female   Other Topics Concern     Parent/sibling w/ CABG, MI or angioplasty before 65F 55M? Yes      Service Not  "Asked     Blood Transfusions Not Asked     Caffeine Concern No     Comment: 3 cups daily     Occupational Exposure Not Asked     Hobby Hazards Not Asked     Sleep Concern Not Asked     Stress Concern Not Asked     Weight Concern Not Asked     Special Diet Yes     Comment: watching carb intake     Back Care Not Asked     Exercise No     Comment: some walking     Bike Helmet Not Asked     Seat Belt Not Asked     Self-Exams Not Asked   Social History Narrative     None       Review of Systems:  Cardiovascular: pos chronic chest discomfort. Neg palpitations, orthopnea, PND, LE edema  Constitutional: negative for chills, sweats, fevers. Pos fatigue.  Resp: Negative for dyspnea at rest, dyspnea on exertion, cough, known chronic lung disease  HEENT: Negative for new visual changes, pos dull headaches.   Gastrointestinal: negative for abdominal pain, diarrhea, nausea, vomiting  Hematologic/lymphatic: pos for current systemic anticoagulation, hx of CVA  Musculoskeletal: negative for new back pain, joint pain  Neurological: negative for focal weakness, LOC, seizures, syncope. Pos lightheaded.      Physical Exam:  Vitals: BP (!) 86/58 (BP Location: Right arm, Patient Position: Chair, Cuff Size: Adult Regular)   Pulse 72   Ht 1.93 m (6' 4\")   Wt 109.9 kg (242 lb 4.8 oz)   SpO2 97%   BMI 29.49 kg/m      Wt Readings from Last 4 Encounters:   02/14/19 109.9 kg (242 lb 4.8 oz)   02/05/19 105.7 kg (233 lb 1.6 oz)   01/25/19 108.2 kg (238 lb 8 oz)   01/14/19 119.3 kg (263 lb)       GEN:  In general, this is a well nourished  male in no acute distress on room air.  Patient ambulatory, accompanied by his wife.  HEENT:  Pupils equal, round. Sclerae nonicteric.   NECK: Supple, no masses appreciated. Trachea midline. No JVD while upright.   C/V:  Irregular rhythm, No murmur, rub or gallop.   RESP: Respirations are unlabored. No use of accessory muscles. Clear to auscultation bilaterally without wheezing, rales, or " rhonchi.  GI: Abdomen soft, nontender, nondistended.   EXTREM: Trace bilateral LE edema. No cyanosis or clubbing.  NEURO: Alert and oriented, cooperative. Gait not formally assessed. No obvious focal deficits.   PSYCH: Normal affect.  SKIN: Warm and dry. No rashes or petechiae appreciated.       Recent Lab Results:  LIPID RESULTS:  Lab Results   Component Value Date    CHOL 81 01/18/2019    HDL 32 (L) 01/18/2019    LDL 33 01/18/2019    TRIG 83 01/18/2019       BMP RESULTS:  Lab Results   Component Value Date     02/14/2019    POTASSIUM 3.7 02/14/2019    CHLORIDE 101 02/14/2019    CO2 28 02/14/2019    ANIONGAP 8 02/14/2019     (H) 02/14/2019    BUN 32 (H) 02/14/2019    CR 1.83 (H) 02/14/2019    GFRESTIMATED 37 (L) 02/14/2019    GFRESTBLACK 43 (L) 02/14/2019    BETTIE 8.6 02/14/2019        New/Pertinent imaging results since last visit:  Echo 1/18/19  Interpretation Summary  Moderately (EF 35-40%) reduced left ventricular function is present. Regional  wall abnormality involving the septum, apex and inferior walls. No LV apical  thrombus.  Global right ventricular function is normal. The right ventricle is normal  size.  The inferior vena cava was dilated at 2.6 cm without respiratory variability,  consistent with increased right atrial pressure.  Estimated mean right atrial pressure is 15 mmHg (significantly elevated).  No significant valvular dysfunction.  No pericardial effusion is present.  Rhythm was atrial fibrillation during the study.     This study was compared with the study from 9/8/2018 .  IVC doesnot collapse in this study. RA pressure is more elevated. RWA is  similar.        Gill Doty PA-C  [facility]  Pager [phone]      ank you for allowing me to participate in the care of your patient.      Sincerely,     CONCEPCION Dasilva     [facility]    cc:   Justin Tomlin MD  [address]  [place] [address]

## 2019-02-14 NOTE — PROGRESS NOTES
"  Cardiology Progress Note    Date of Service: 02/14/2019      Reason for visit: [facility] clinic follow up, acute systolic heart failure.    Primary cardiologist: Dr. Justin Tomiln      HPI:  Mr. Elder is a pleasant 68 year old male previously followed by Dr. Mckeon, with a PMHx including DM2, CVA, untreated MARISOL, and renal dysfunction. From a cardiac standpoint, he has chronic afib on AC, and known coronary disease with previous MI/stenting and ischemic CMO with chronic ejection fraction of 35-40%. He has problems with peripheral wounds from his diabetes and recent gangrene of his foot. He was admitted in early Jan 2019 for osteomyelitis. This stay was complicated by ABNER with IV antibiotics and his chronic clopidogrel was stopped. He then presented back shortly after, with weight gain and ANDREWS. He was again hospitalized and treated for a CHF exacerbation. He was not on diuresis prior to that admission. During that stay he was diuresed ~30 pounds. Echo during that stay showed no new WMAs and EF remained in the 35-40% range.     He then saw Dr. Tomlin for a CORE MD evaluation about a week ago. At that time his weight was down an additional 5 lbs from discharge, and he was noting weakness and dizzy spells. At that visit, he was felt over diuresed, and he asked him to hold his scheduled Lasix x 2 days and resume at 40mg daily as needed with a goal weight of 230-232 lbs. He's back today for short term follow up with me here in the [facility] clinic.    Since last visit, Mr. Elder's weight is back up slightly at 237 on his home scale. As directed, he did hold x 2 days and has since been taking 40mg of lasix daily. He forgot his weight log, but states they are \"creeping up.\" Despite this, he denies worsening leg edema or ANDREWS. His labs today show an overall improvement in his renal function, with BUN/SCr now down to 32/1.83 respectively. He hasn't been check ing his BP at home because he hasn't had a \"spell\" since he was here " "last, and states he's overall improved in this regard. However, he is mildly lightheaded today, and BP is low at 86/58. He describes this as an ongoing \"pressure or headache\" at times. He denies syncope/presyncope. He denies chest \"pain\" but says he almost constantly is \"aware of\" a feeling in his mid chest and neck. He tells me this is chronic and unchanged. He has rare palpitations and known afib. He saw Dr. Lua last week and his DM medications have been adjusted, however he states his BS are still running high, sometimes in the 200-300 range. Glucose today on labs was 227.       ASSESSMENT/PLAN:    1. Acute on chronic systolic heart failure, known ischemic cardiomyopathy.   --Known chronic EF 35-40%, not significantly changed per echo with recent admit Jan 2019.    --Recent exacerbation requiring aggressive diuresis of ~30 lbs. Baseline weight is still a bit difficult, but may be in the 235-237 range based on symptoms and renal function. He is now on lasix 40mg daily which we will continue, along with current dose of Kdur. I've asked him to continue daily weights at home and bring his log to next visit. I encouraged dietary sodium restriction.    --Continue carvedilol 25mg BID. His lisinopril is still being held due to renal dysfunction, which we would like to resume if/when possible. He does not have an appt with nephrology yet that he is aware of. I've asked our staff to investigate whether a formal referral has been placed.   --I also discussed how ongoing poor DM control can contribute to cardiac dysfunction. If his BS do not improve with his current sliding scale I've asked him to call Dr. Lua's office for an update.     2. Chronic atrial fibrillation.   --Appears to be chronically rate controlled, asymptomatic. On carvedilol as above.   --Continue Eliquis, currently on 2.5mg BID due to renal issues, though also likely can go back up in the near future if renal function stabilizes.     3. Coronary " artery disease.   --No ischemic symptoms, though does have a chronic chest discomfort. Last intervention June 2017 involving an obtuse marginal with stent placement. Other stents were widely patent at that time.    --He is now no longer on clopidogrel, though may consider resuming ASA with his Eliquis. Will d/w Dr. Tomlni.   --Continue atorvastatin 40mg at bedtime. Last LDL under excellent control at 33 Jan 2019.    --He is chronically on Imdur, I believe due to the chronic chest discomfort, though this is somewhat atypical pain. Will also d/w Dr. Tomlin whether this could be discontinued in the future if we are able to resume an ACE-I.          Follow up plan: In 1 week in [facility] for continued medication adjustments, BP monitoring.       Orders this Visit:  Orders Placed This Encounter   Procedures     Follow-Up with [facility] - [facility]OR[facility] visit     Orders Placed This Encounter   Medications     furosemide (LASIX) 40 MG tablet     Sig: Take 1 tablet (40 mg) by mouth daily     Dispense:  90 tablet     Refill:  3     Medications Discontinued During This Encounter   Medication Reason     amLODIPine (NORVASC) 5 MG tablet Stop at Discharge     furosemide (LASIX) 80 MG tablet Dose adjustment     lisinopril (PRINIVIL/ZESTRIL) 20 MG tablet Discontinued by another Health Care Provider           CURRENT MEDICATIONS:  Current Outpatient Medications   Medication Sig Dispense Refill     apixaban ANTICOAGULANT (ELIQUIS) 2.5 MG tablet Take 1 tablet (2.5 mg) by mouth 2 times daily HOLD this medication until instructed to resume by your primary MD 60 tablet 1     atorvastatin (LIPITOR) 40 MG tablet Take 1 tablet (40 mg) by mouth every evening 30 tablet 0     carvedilol (COREG) 25 MG tablet Take 1 tablet (25 mg) by mouth 2 times daily (with meals) 180 tablet 3     Cholecalciferol (VITAMIN D3) 2000 units TABS Take 1 tablet by mouth daily       furosemide (LASIX) 40 MG tablet Take 1 tablet (40 mg) by mouth daily 90 tablet 3     insulin  aspart (NOVOLOG FLEXPEN) 100 UNIT/ML pen Inject 8 Units Subcutaneous 3 times daily (with meals) USes about 7-10 units with meal depending 7.2 mL 0     insulin glargine (LANTUS SOLOSTAR PEN) 100 UNIT/ML pen Inject 26 Units Subcutaneous every morning (before breakfast) (Patient taking differently: Inject 36 Units Subcutaneous every morning (before breakfast) ) 7.8 mL 0     insulin pen needle 31G X 6 MM Use  as directed. 100 each prn     isosorbide mononitrate (IMDUR) 30 MG 24 hr tablet Take 1 tablet (30 mg) by mouth daily 90 tablet 0     levothyroxine (SYNTHROID, LEVOTHROID) 137 MCG tablet Take 137 mcg by mouth At Bedtime  90 tablet 3     pantoprazole (PROTONIX) 40 MG enteric coated tablet Take 1 tablet (40 mg) by mouth every morning (before breakfast) 30 tablet 0     potassium chloride ER (K-DUR/KLOR-CON M) 10 MEQ CR tablet Take 1 tablet (10 mEq) by mouth 2 times daily 60 tablet 2     nitroglycerin (NITROSTAT) 0.4 MG sublingual tablet Place 1 tablet (0.4 mg) under the tongue every 5 minutes as needed for chest pain (Patient not taking: Reported on 2/14/2019) 50 tablet 0     order for DME Equipment being ordered: Other: surgical shoe male XL  Treatment Diagnosis: sp right 2nd toe amputaion (Patient not taking: Reported on 2/5/2019) 1 Device 0     order for DME Equipment being ordered: Walker Wheels () and Walker ()  Treatment Diagnosis: Impaired gait, heel WB bilaterally. 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     Cardiomyopathy (H)      Cellulitis of left lower extremity 3/13/2018     Cerebral infarction (H)     eye sight decreased in peripheral of right side and blurry     Diabetic ulcer of left midfoot  associated with type 2 diabetes mellitus, with fat layer exposed (H) 3/13/2018     Essential hypertension, benign 11/13/2002     Generalized osteoarthrosis, unspecified site 11/13/2002     Microalbuminuria 3/13/2018    X1     Myocardial infarction (H)      Neuropathy      Sepsis (H)      Sleep apnea     doesn't use it     Substance abuse (H)      Syncope, unspecified syncope type 3/13/2018     Thyroid disease     takes medicine     Tobacco use disorder 11/13/2002     Type 2 diabetes mellitus with diabetic peripheral angiopathy without gangrene, unspecified long term insulin use status 2005       PAST SURGICAL HISTORY:  Past Surgical History:   Procedure Laterality Date     AMPUTATE TOE(S) Right 1/6/2019    Procedure: Right open second toe partial amputation;  Surgeon: Sabino Garcia DPM;  Location: [facility] OR     AMPUTATE TOE(S) Right 1/8/2019    Procedure: 1.  Revision right second toe amputation with resection of distal half of proximal phalanx with full closure.    2.  Debridement of callus/preulcerative lesion, distal left second toe and plantar left first metatarsophalangeal joint.;  Surgeon: Ryan Bhagat DPM;  Location: [facility] OR     ANGIOGRAM  6/29/05    subtotal occ.mid LAD,SUSAN mid LAD     ANGIOGRAM  7/05    mild CAD,patent stent,no flow-limiting lesions,sev.LV dysf.LAD enlarged     ANGIOGRAM  2/09    Sev.single vessel disease,Mod LV dysf.distal LAD 90%,70-75% mid lad just before prev stent,SUSAN to prox.mid LAD, endeavor to distal LAD     ANGIOGRAM  11/13/13    restenosis, stent LAD     BACK SURGERY      back surgery x3     C NONSPECIFIC PROCEDURE      Laminectomy x 3 - (1983 x 2 & 1990)     C NONSPECIFIC PROCEDURE Bilateral 1998    Bunionectomy     C NONSPECIFIC PROCEDURE  1959    Gingival surgery at age 9     HEART CATH LEFT HEART CATH  06/13/2017    SUSAN to OM3     INCISION AND DRAINAGE TOE, COMBINED Bilateral 9/11/2018    Procedure: COMBINED INCISION AND DRAINAGE TOE;  1) Irrigation and debridement  ulcer left great toe  2) Amputation 3rd toe right foot at distal interphalangeal joint level;  Surgeon: Miki Harp MD;  Location: [facility] OR     ORTHOPEDIC SURGERY      bunion surgery both feet     ORTHOPEDIC SURGERY      left shoulder     SOFT TISSUE SURGERY      debridement of toe numerous time     VASCULAR SURGERY      7 stents in heart       FAMILY HISTORY:  Family History   Problem Relation Age of Onset     Cancer - colorectal Sister      Thyroid Disease Brother      Cardiovascular Brother      Diabetes Brother      Cancer Father         tumor in chest and throat     Arthritis Mother      Thyroid Disease Mother      Diabetes Mother      Glaucoma No family hx of      Macular Degeneration No family hx of        SOCIAL HISTORY:  Social History     Socioeconomic History     Marital status:      Spouse name: None     Number of children: None     Years of education: None     Highest education level: None   Social Needs     Financial resource strain: None     Food insecurity - worry: None     Food insecurity - inability: None     Transportation needs - medical: None     Transportation needs - non-medical: None   Occupational History     None   Tobacco Use     Smoking status: Former Smoker     Packs/day: 1.00     Years: 25.00     Pack years: 25.00     Types: Cigarettes     Last attempt to quit: 2005     Years since quittin.5     Smokeless tobacco: Never Used   Substance and Sexual Activity     Alcohol use: Yes     Alcohol/week: 2.4 oz     Types: 4 Shots of liquor per week     Comment: almost every day     Drug use: No     Sexual activity: Yes     Partners: Female   Other Topics Concern     Parent/sibling w/ CABG, MI or angioplasty before 65F 55M? Yes      Service Not Asked     Blood Transfusions Not Asked     Caffeine Concern No     Comment: 3 cups daily     Occupational Exposure Not Asked     Hobby Hazards Not Asked     Sleep Concern Not Asked     Stress Concern Not Asked     Weight  "Concern Not Asked     Special Diet Yes     Comment: watching carb intake     Back Care Not Asked     Exercise No     Comment: some walking     Bike Helmet Not Asked     Seat Belt Not Asked     Self-Exams Not Asked   Social History Narrative     None       Review of Systems:  Cardiovascular: pos chronic chest discomfort. Neg palpitations, orthopnea, PND, LE edema  Constitutional: negative for chills, sweats, fevers. Pos fatigue.  Resp: Negative for dyspnea at rest, dyspnea on exertion, cough, known chronic lung disease  HEENT: Negative for new visual changes, pos dull headaches.   Gastrointestinal: negative for abdominal pain, diarrhea, nausea, vomiting  Hematologic/lymphatic: pos for current systemic anticoagulation, hx of CVA  Musculoskeletal: negative for new back pain, joint pain  Neurological: negative for focal weakness, LOC, seizures, syncope. Pos lightheaded.      Physical Exam:  Vitals: BP (!) 86/58 (BP Location: Right arm, Patient Position: Chair, Cuff Size: Adult Regular)   Pulse 72   Ht 1.93 m (6' 4\")   Wt 109.9 kg (242 lb 4.8 oz)   SpO2 97%   BMI 29.49 kg/m     Wt Readings from Last 4 Encounters:   02/14/19 109.9 kg (242 lb 4.8 oz)   02/05/19 105.7 kg (233 lb 1.6 oz)   01/25/19 108.2 kg (238 lb 8 oz)   01/14/19 119.3 kg (263 lb)       GEN:  In general, this is a well nourished  male in no acute distress on room air.  Patient ambulatory, accompanied by his wife.  HEENT:  Pupils equal, round. Sclerae nonicteric.   NECK: Supple, no masses appreciated. Trachea midline. No JVD while upright.   C/V:  Irregular rhythm, No murmur, rub or gallop.   RESP: Respirations are unlabored. No use of accessory muscles. Clear to auscultation bilaterally without wheezing, rales, or rhonchi.  GI: Abdomen soft, nontender, nondistended.   EXTREM: Trace bilateral LE edema. No cyanosis or clubbing.  NEURO: Alert and oriented, cooperative. Gait not formally assessed. No obvious focal deficits.   PSYCH: Normal " affect.  SKIN: Warm and dry. No rashes or petechiae appreciated.       Recent Lab Results:  LIPID RESULTS:  Lab Results   Component Value Date    CHOL 81 01/18/2019    HDL 32 (L) 01/18/2019    LDL 33 01/18/2019    TRIG 83 01/18/2019       BMP RESULTS:  Lab Results   Component Value Date     02/14/2019    POTASSIUM 3.7 02/14/2019    CHLORIDE 101 02/14/2019    CO2 28 02/14/2019    ANIONGAP 8 02/14/2019     (H) 02/14/2019    BUN 32 (H) 02/14/2019    CR 1.83 (H) 02/14/2019    GFRESTIMATED 37 (L) 02/14/2019    GFRESTBLACK 43 (L) 02/14/2019    BETTIE 8.6 02/14/2019        New/Pertinent imaging results since last visit:  Echo 1/18/19  Interpretation Summary  Moderately (EF 35-40%) reduced left ventricular function is present. Regional  wall abnormality involving the septum, apex and inferior walls. No LV apical  thrombus.  Global right ventricular function is normal. The right ventricle is normal  size.  The inferior vena cava was dilated at 2.6 cm without respiratory variability,  consistent with increased right atrial pressure.  Estimated mean right atrial pressure is 15 mmHg (significantly elevated).  No significant valvular dysfunction.  No pericardial effusion is present.  Rhythm was atrial fibrillation during the study.     This study was compared with the study from 9/8/2018 .  IVC doesnot collapse in this study. RA pressure is more elevated. RWA is  similar.        Gill Doty PA-C  [facility]  Pager [phone]

## 2019-02-14 NOTE — LETTER
"2/14/2019    Davion Cordova PA-C  [id number] [facility] [address]    RE: Jayro Jainlala       Dear Colleague,    I had the pleasure of seeing Jayro Elder in the [facility].      Cardiology Progress Note    Date of Service: 02/14/2019      Reason for visit: CORE clinic follow up, acute systolic heart failure.    Primary cardiologist: Dr. Justin Tomlin      HPI:  Mr. Elder is a pleasant 68 year old male previously followed by Dr. Mckeon, with a PMHx including DM2, CVA, untreated MARISOL, and renal dysfunction. From a cardiac standpoint, he has chronic afib on AC, and known coronary disease with previous MI/stenting and ischemic CMO with chronic ejection fraction of 35-40%. He has problems with peripheral wounds from his diabetes and recent gangrene of his foot. He was admitted in early Jan 2019 for osteomyelitis. This stay was complicated by ABNER with IV antibiotics and his chronic clopidogrel was stopped. He then presented back shortly after, with weight gain and ANDREWS. He was again hospitalized and treated for a CHF exacerbation. He was not on diuresis prior to that admission. During that stay he was diuresed ~30 pounds. Echo during that stay showed no new WMAs and EF remained in the 35-40% range.     He then saw Dr. Tomlin for a CORE MD evaluation about a week ago. At that time his weight was down an additional 5 lbs from discharge, and he was noting weakness and dizzy spells. At that visit, he was felt over diuresed, and he asked him to hold his scheduled Lasix x 2 days and resume at 40mg daily as needed with a goal weight of 230-232 lbs. He's back today for short term follow up with me here in the CORE clinic.    Since last visit, Mr. Elder's weight is back up slightly at 237 on his home scale. As directed, he did hold x 2 days and has since been taking 40mg of lasix daily. He forgot his weight log, but states they are \"creeping up.\" Despite this, he denies " "worsening leg edema or ANDREWS. His labs today show an overall improvement in his renal function, with BUN/SCr now down to 32/1.83 respectively. He hasn't been check ing his BP at home because he hasn't had a \"spell\" since he was here last, and states he's overall improved in this regard. However, he is mildly lightheaded today, and BP is low at 86/58. He describes this as an ongoing \"pressure or headache\" at times. He denies syncope/presyncope. He denies chest \"pain\" but says he almost constantly is \"aware of\" a feeling in his mid chest and neck. He tells me this is chronic and unchanged. He has rare palpitations and known afib. He saw Dr. Lua last week and his DM medications have been adjusted, however he states his BS are still running high, sometimes in the 200-300 range. Glucose today on labs was 227.       ASSESSMENT/PLAN:    1. Acute on chronic systolic heart failure, known ischemic cardiomyopathy.   --Known chronic EF 35-40%, not significantly changed per echo with recent admit Jan 2019.    --Recent exacerbation requiring aggressive diuresis of ~30 lbs. Baseline weight is still a bit difficult, but may be in the 235-237 range based on symptoms and renal function. He is now on lasix 40mg daily which we will continue, along with current dose of Kdur. I've asked him to continue daily weights at home and bring his log to next visit. I encouraged dietary sodium restriction.    --Continue carvedilol 25mg BID. His lisinopril is still being held due to renal dysfunction, which we would like to resume if/when possible. He does not have an appt with nephrology yet that he is aware of. I've asked our staff to investigate whether a formal referral has been placed.   --I also discussed how ongoing poor DM control can contribute to cardiac dysfunction. If his BS do not improve with his current sliding scale I've asked him to call Dr. Lua's office for an update.     2. Chronic atrial fibrillation.   --Appears to be " chronically rate controlled, asymptomatic. On carvedilol as above.   --Continue Eliquis, currently on 2.5mg BID due to renal issues, though also likely can go back up in the near future if renal function stabilizes.     3. Coronary artery disease.   --No ischemic symptoms, though does have a chronic chest discomfort. Last intervention June 2017 involving an obtuse marginal with stent placement. Other stents were widely patent at that time.    --He is now no longer on clopidogrel, though may consider resuming ASA with his Eliquis. Will d/w Dr. Tomlin.   --Continue atorvastatin 40mg at bedtime. Last LDL under excellent control at 33 Jan 2019.    --He is chronically on Imdur, I believe due to the chronic chest discomfort, though this is somewhat atypical pain. Will also d/w Dr. Tomlin whether this could be discontinued in the future if we are able to resume an ACE-I.          Follow up plan: In [place] [facility] [place] adjustments, BP monitoring.       Orders this Visit:  Orders Placed This Encounter   Procedures     Follow-Up with [facility] - [place] visit     Orders Placed This Encounter   Medications     furosemide (LASIX) 40 MG tablet     Sig: Take 1 tablet (40 mg) by mouth daily     Dispense:  90 tablet     Refill:  3     Medications Discontinued During This Encounter   Medication Reason     amLODIPine (NORVASC) 5 MG tablet Stop at Discharge     furosemide (LASIX) 80 MG tablet Dose adjustment     lisinopril (PRINIVIL/ZESTRIL) 20 MG tablet Discontinued by another Health Care Provider           CURRENT MEDICATIONS:  Current Outpatient Medications   Medication Sig Dispense Refill     apixaban ANTICOAGULANT (ELIQUIS) 2.5 MG tablet Take 1 tablet (2.5 mg) by mouth 2 times daily HOLD this medication until instructed to resume by your primary MD 60 tablet 1     atorvastatin (LIPITOR) 40 MG tablet Take 1 tablet (40 mg) by mouth every evening 30 tablet 0     carvedilol (COREG) 25 MG tablet Take 1 tablet (25 mg)  by mouth 2 times daily (with meals) 180 tablet 3     Cholecalciferol (VITAMIN D3) 2000 units TABS Take 1 tablet by mouth daily       furosemide (LASIX) 40 MG tablet Take 1 tablet (40 mg) by mouth daily 90 tablet 3     insulin aspart (NOVOLOG FLEXPEN) 100 UNIT/ML pen Inject 8 Units Subcutaneous 3 times daily (with meals) USes about 7-10 units with meal depending 7.2 mL 0     insulin glargine (LANTUS SOLOSTAR PEN) 100 UNIT/ML pen Inject 26 Units Subcutaneous every morning (before breakfast) (Patient taking differently: Inject 36 Units Subcutaneous every morning (before breakfast) ) 7.8 mL 0     insulin pen needle 31G X 6 MM Use  as directed. 100 each prn     isosorbide mononitrate (IMDUR) 30 MG 24 hr tablet Take 1 tablet (30 mg) by mouth daily 90 tablet 0     levothyroxine (SYNTHROID, LEVOTHROID) 137 MCG tablet Take 137 mcg by mouth At Bedtime  90 tablet 3     pantoprazole (PROTONIX) 40 MG enteric coated tablet Take 1 tablet (40 mg) by mouth every morning (before breakfast) 30 tablet 0     potassium chloride ER (K-DUR/KLOR-CON M) 10 MEQ CR tablet Take 1 tablet (10 mEq) by mouth 2 times daily 60 tablet 2     nitroglycerin (NITROSTAT) 0.4 MG sublingual tablet Place 1 tablet (0.4 mg) under the tongue every 5 minutes as needed for chest pain (Patient not taking: Reported on 2/14/2019) 50 tablet 0     order for DME Equipment being ordered: Other: surgical shoe male XL  Treatment Diagnosis: sp right 2nd toe amputaion (Patient not taking: Reported on 2/5/2019) 1 Device 0     order for DME Equipment being ordered: Walker Wheels () and Walker ()  Treatment Diagnosis: Impaired gait, heel WB bilaterally. 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     Cardiomyopathy (H)      Cellulitis of left lower extremity 3/13/2018     Cerebral infarction (H)     eye sight decreased in peripheral of right side and blurry     Diabetic ulcer of left midfoot associated with type 2 diabetes mellitus, with fat layer exposed (H) 3/13/2018     Essential hypertension, benign 11/13/2002     Generalized osteoarthrosis, unspecified site 11/13/2002     Microalbuminuria 3/13/2018    X1     Myocardial infarction (H)      Neuropathy      Sepsis (H)      Sleep apnea     doesn't use it     Substance abuse (H)      Syncope, unspecified syncope type 3/13/2018     Thyroid disease     takes medicine     Tobacco use disorder 11/13/2002     Type 2 diabetes mellitus with diabetic peripheral angiopathy without gangrene, unspecified long term insulin use status 2005       PAST SURGICAL HISTORY:  Past Surgical History:   Procedure Laterality Date     AMPUTATE TOE(S) Right 1/6/2019    Procedure: Right open second toe partial amputation;  Surgeon: Sabino Garcia DPM;  Location: [facility] OR     AMPUTATE TOE(S) Right 1/8/2019    Procedure: 1.  Revision right second toe amputation with resection of distal half of proximal phalanx with full closure.    2.  Debridement of callus/preulcerative lesion, distal left second toe and plantar left first metatarsophalangeal joint.;  Surgeon: Ryan Bhagat DPM;  Location: [facility] OR     ANGIOGRAM  6/29/05    subtotal occ.mid LAD,SUSAN mid LAD     ANGIOGRAM  7/05    mild CAD,patent stent,no flow-limiting lesions,sev.LV dysf.LAD enlarged     ANGIOGRAM  2/09    Sev.single vessel disease,Mod LV dysf.distal LAD 90%,70-75% mid lad just before prev stent,SUSAN to prox.mid LAD, endeavor to distal LAD     ANGIOGRAM  11/13/13    restenosis, stent LAD     BACK SURGERY      back surgery x3     C NONSPECIFIC PROCEDURE      Laminectomy x 3 - (1983 x 2 & 1990)     C NONSPECIFIC PROCEDURE Bilateral 1998    Bunionectomy     C NONSPECIFIC PROCEDURE  1959     Gingival surgery at age 9     HEART CATH LEFT HEART CATH  2017    SUSAN to OM3     INCISION AND DRAINAGE TOE, COMBINED Bilateral 2018    Procedure: COMBINED INCISION AND DRAINAGE TOE;  1) Irrigation and debridement ulcer left great toe  2) Amputation 3rd toe right foot at distal interphalangeal joint level;  Surgeon: Miki Harp MD;  Location: [facility] OR     ORTHOPEDIC SURGERY      bunion surgery both feet     ORTHOPEDIC SURGERY      left shoulder     SOFT TISSUE SURGERY      debridement of toe numerous time     VASCULAR SURGERY      7 stents in heart       FAMILY HISTORY:  Family History   Problem Relation Age of Onset     Cancer - colorectal Sister      Thyroid Disease Brother      Cardiovascular Brother      Diabetes Brother      Cancer Father         tumor in chest and throat     Arthritis Mother      Thyroid Disease Mother      Diabetes Mother      Glaucoma No family hx of      Macular Degeneration No family hx of        SOCIAL HISTORY:  Social History     Socioeconomic History     Marital status:      Spouse name: None     Number of children: None     Years of education: None     Highest education level: None   Social Needs     Financial resource strain: None     Food insecurity - worry: None     Food insecurity - inability: None     Transportation needs - medical: None     Transportation needs - non-medical: None   Occupational History     None   Tobacco Use     Smoking status: Former Smoker     Packs/day: 1.00     Years: 25.00     Pack years: 25.00     Types: Cigarettes     Last attempt to quit: 2005     Years since quittin.5     Smokeless tobacco: Never Used   Substance and Sexual Activity     Alcohol use: Yes     Alcohol/week: 2.4 oz     Types: 4 Shots of liquor per week     Comment: almost every day     Drug use: No     Sexual activity: Yes     Partners: Female   Other Topics Concern     Parent/sibling w/ CABG, MI or angioplasty before 65F 55M? Yes      Service Not  "Asked     Blood Transfusions Not Asked     Caffeine Concern No     Comment: 3 cups daily     Occupational Exposure Not Asked     Hobby Hazards Not Asked     Sleep Concern Not Asked     Stress Concern Not Asked     Weight Concern Not Asked     Special Diet Yes     Comment: watching carb intake     Back Care Not Asked     Exercise No     Comment: some walking     Bike Helmet Not Asked     Seat Belt Not Asked     Self-Exams Not Asked   Social History Narrative     None       Review of Systems:  Cardiovascular: pos chronic chest discomfort. Neg palpitations, orthopnea, PND, LE edema  Constitutional: negative for chills, sweats, fevers. Pos fatigue.  Resp: Negative for dyspnea at rest, dyspnea on exertion, cough, known chronic lung disease  HEENT: Negative for new visual changes, pos dull headaches.   Gastrointestinal: negative for abdominal pain, diarrhea, nausea, vomiting  Hematologic/lymphatic: pos for current systemic anticoagulation, hx of CVA  Musculoskeletal: negative for new back pain, joint pain  Neurological: negative for focal weakness, LOC, seizures, syncope. Pos lightheaded.      Physical Exam:  Vitals: BP (!) 86/58 (BP Location: Right arm, Patient Position: Chair, Cuff Size: Adult Regular)   Pulse 72   Ht 1.93 m (6' 4\")   Wt 109.9 kg (242 lb 4.8 oz)   SpO2 97%   BMI 29.49 kg/m      Wt Readings from Last 4 Encounters:   02/14/19 109.9 kg (242 lb 4.8 oz)   02/05/19 105.7 kg (233 lb 1.6 oz)   01/25/19 108.2 kg (238 lb 8 oz)   01/14/19 119.3 kg (263 lb)       GEN:  In general, this is a well nourished  male in no acute distress on room air.  Patient ambulatory, accompanied by his wife.  HEENT:  Pupils equal, round. Sclerae nonicteric.   NECK: Supple, no masses appreciated. Trachea midline. No JVD while upright.   C/V:  Irregular rhythm, No murmur, rub or gallop.   RESP: Respirations are unlabored. No use of accessory muscles. Clear to auscultation bilaterally without wheezing, rales, or " rhonchi.  GI: Abdomen soft, nontender, nondistended.   EXTREM: Trace bilateral LE edema. No cyanosis or clubbing.  NEURO: Alert and oriented, cooperative. Gait not formally assessed. No obvious focal deficits.   PSYCH: Normal affect.  SKIN: Warm and dry. No rashes or petechiae appreciated.       Recent Lab Results:  LIPID RESULTS:  Lab Results   Component Value Date    CHOL 81 01/18/2019    HDL 32 (L) 01/18/2019    LDL 33 01/18/2019    TRIG 83 01/18/2019       BMP RESULTS:  Lab Results   Component Value Date     02/14/2019    POTASSIUM 3.7 02/14/2019    CHLORIDE 101 02/14/2019    CO2 28 02/14/2019    ANIONGAP 8 02/14/2019     (H) 02/14/2019    BUN 32 (H) 02/14/2019    CR 1.83 (H) 02/14/2019    GFRESTIMATED 37 (L) 02/14/2019    GFRESTBLACK 43 (L) 02/14/2019    BETTIE 8.6 02/14/2019        New/Pertinent imaging results since last visit:  Echo 1/18/19  Interpretation Summary  Moderately (EF 35-40%) reduced left ventricular function is present. Regional  wall abnormality involving the septum, apex and inferior walls. No LV apical  thrombus.  Global right ventricular function is normal. The right ventricle is normal  size.  The inferior vena cava was dilated at 2.6 cm without respiratory variability,  consistent with increased right atrial pressure.  Estimated mean right atrial pressure is 15 mmHg (significantly elevated).  No significant valvular dysfunction.  No pericardial effusion is present.  Rhythm was atrial fibrillation during the study.     This study was compared with the study from 9/8/2018 .  IVC doesnot collapse in this study. RA pressure is more elevated. RWA is  similar.        Gill Doty PA-C  [facility]  Pager [phone]          Thank you for allowing me to participate in the care of your patient.    Sincerely,     CONCEPCION Dasilva     [facility]

## 2019-02-14 NOTE — PATIENT INSTRUCTIONS
Call C.O.R.E. nurse for any questions or concerns Mon-Fri 8am-4pm:    [phone]    For concerns after hours: [phone]    Medication changes:  STOP amlodipine                                     CONTINUE lasix 40mg once daily             Plan from today:  Keep weighing yourself daily. If you get dizzy please take your BP at home and write it down.   We will work on getting your an apt with kidney doctor in the near future.       Lab results: Results for PORTER YANCEY (MRN [id number]) as of 2/14/2019 08:25   Ref. Range 2/14/2019 07:13   Sodium Latest Ref Range: 133 - 144 mmol/L 137   Potassium Latest Ref Range: 3.4 - 5.3 mmol/L 3.7   Chloride Latest Ref Range: 94 - 109 mmol/L 101   Carbon Dioxide Latest Ref Range: 20 - 32 mmol/L 28   Urea Nitrogen Latest Ref Range: 7 - 30 mg/dL 32 (H)   Creatinine Latest Ref Range: 0.66 - 1.25 mg/dL 1.83 (H)   GFR Estimate Latest Ref Range: >60 mL/min/1.73_m2 37 (L)   GFR Estimate If Black Latest Ref Range: >60 mL/min/1.73_m2 43 (L)   Calcium Latest Ref Range: 8.5 - 10.1 mg/dL 8.6   Anion Gap Latest Ref Range: 3 - 14 mmol/L 8   Glucose Latest Ref Range: 70 - 99 mg/dL 227 (H)

## 2019-02-22 DIAGNOSIS — I50.23 ACUTE ON CHRONIC SYSTOLIC CONGESTIVE HEART FAILURE (H): ICD-10-CM

## 2019-02-22 DIAGNOSIS — I10 HYPERTENSION GOAL BP (BLOOD PRESSURE) < 140/90: ICD-10-CM

## 2019-02-22 DIAGNOSIS — I63.432 CEREBROVASCULAR ACCIDENT (CVA) DUE TO EMBOLISM OF LEFT POSTERIOR CEREBRAL ARTERY (H): ICD-10-CM

## 2019-02-22 RX ORDER — CARVEDILOL 25 MG/1
25 TABLET ORAL 2 TIMES DAILY WITH MEALS
Qty: 180 TABLET | Refills: 3 | Status: SHIPPED | OUTPATIENT
Start: 2019-02-22 | End: 2020-03-20

## 2019-02-22 RX ORDER — ATORVASTATIN CALCIUM 40 MG/1
40 TABLET, FILM COATED ORAL EVERY EVENING
Qty: 90 TABLET | Refills: 3 | Status: SHIPPED | OUTPATIENT
Start: 2019-02-22 | End: 2020-03-20

## 2019-02-22 NOTE — TELEPHONE ENCOUNTER
Call request from: Patient   Medication requested: Atorvastatin and Coreg  Last Office visit with: Gill JAVIER 2/14/2019, labs 1/18/2019  Follow up scheduled: 3/7/2019 w/ Gill JAVIER  E-Scripted to: CÉSAR Tate RN 02/22/19 10:51 AM

## 2019-03-01 ENCOUNTER — TELEPHONE (OUTPATIENT)
Dept: CARDIOLOGY | Facility: CLINIC | Age: 69
End: 2019-03-01

## 2019-03-01 DIAGNOSIS — I10 HYPERTENSION GOAL BP (BLOOD PRESSURE) < 140/90: ICD-10-CM

## 2019-03-01 RX ORDER — ISOSORBIDE MONONITRATE 30 MG/1
30 TABLET, EXTENDED RELEASE ORAL DAILY
Qty: 90 TABLET | Refills: 1 | Status: ON HOLD | OUTPATIENT
Start: 2019-03-01 | End: 2019-07-15

## 2019-03-01 NOTE — TELEPHONE ENCOUNTER
Request from [facility] pharmacy [facility][id number] for refill of amlodipine.  Pt's amlodipine was discontinued on 2/14/19, so did not do a refill.

## 2019-03-01 NOTE — TELEPHONE ENCOUNTER
Placed call to [facility] to verify referral has been placed. Spoke w/scheduling staff - referral has been made, but staff unable to detect what provider made the referral or when it was placed. Verified that attempts have been made to contact patient for nephrology follow-up. No details on who spoke w/pt or if voice mails have been left. Requested [facility] scheduling staff make another attempt to contact patient for appointment. Will encourage patient to follow-up at office visit w/KASSI Andrea on 3/7/19.     Henrry Syed LPN  [facility]  [phone]

## 2019-03-07 ENCOUNTER — OFFICE VISIT (OUTPATIENT)
Dept: CARDIOLOGY | Facility: CLINIC | Age: 69
End: 2019-03-07
Attending: PHYSICIAN ASSISTANT
Payer: COMMERCIAL

## 2019-03-07 ENCOUNTER — CARE COORDINATION (OUTPATIENT)
Dept: CARDIOLOGY | Facility: CLINIC | Age: 69
End: 2019-03-07

## 2019-03-07 VITALS
HEART RATE: 80 BPM | WEIGHT: 246.6 LBS | DIASTOLIC BLOOD PRESSURE: 68 MMHG | OXYGEN SATURATION: 99 % | BODY MASS INDEX: 30.03 KG/M2 | HEIGHT: 76 IN | SYSTOLIC BLOOD PRESSURE: 106 MMHG

## 2019-03-07 DIAGNOSIS — I48.20 CHRONIC ATRIAL FIBRILLATION (H): ICD-10-CM

## 2019-03-07 DIAGNOSIS — I50.23 ACUTE ON CHRONIC SYSTOLIC CONGESTIVE HEART FAILURE (H): Primary | ICD-10-CM

## 2019-03-07 DIAGNOSIS — I50.22 CHRONIC SYSTOLIC CONGESTIVE HEART FAILURE (H): ICD-10-CM

## 2019-03-07 DIAGNOSIS — I50.22 CHRONIC SYSTOLIC CONGESTIVE HEART FAILURE (H): Primary | ICD-10-CM

## 2019-03-07 LAB
ANION GAP SERPL CALCULATED.3IONS-SCNC: 4 MMOL/L (ref 3–14)
BUN SERPL-MCNC: 19 MG/DL (ref 7–30)
CALCIUM SERPL-MCNC: 8.4 MG/DL (ref 8.5–10.1)
CHLORIDE SERPL-SCNC: 101 MMOL/L (ref 94–109)
CO2 SERPL-SCNC: 30 MMOL/L (ref 20–32)
CREAT SERPL-MCNC: 1.38 MG/DL (ref 0.66–1.25)
GFR SERPL CREATININE-BSD FRML MDRD: 52 ML/MIN/{1.73_M2}
GLUCOSE SERPL-MCNC: 202 MG/DL (ref 70–99)
POTASSIUM SERPL-SCNC: 3.7 MMOL/L (ref 3.4–5.3)
SODIUM SERPL-SCNC: 135 MMOL/L (ref 133–144)

## 2019-03-07 PROCEDURE — 36415 COLL VENOUS BLD VENIPUNCTURE: CPT | Performed by: PHYSICIAN ASSISTANT

## 2019-03-07 PROCEDURE — 80048 BASIC METABOLIC PNL TOTAL CA: CPT | Performed by: PHYSICIAN ASSISTANT

## 2019-03-07 PROCEDURE — 99214 OFFICE O/P EST MOD 30 MIN: CPT | Mod: 24 | Performed by: PHYSICIAN ASSISTANT

## 2019-03-07 RX ORDER — LISINOPRIL 5 MG/1
5 TABLET ORAL DAILY
Qty: 90 TABLET | Refills: 3 | Status: ON HOLD | OUTPATIENT
Start: 2019-03-07 | End: 2019-07-09

## 2019-03-07 RX ORDER — FUROSEMIDE 80 MG
80 TABLET ORAL DAILY
Qty: 90 TABLET | Refills: 1 | Status: ON HOLD | OUTPATIENT
Start: 2019-03-07 | End: 2019-07-09

## 2019-03-07 ASSESSMENT — MIFFLIN-ST. JEOR: SCORE: 1990.07

## 2019-03-07 NOTE — PROGRESS NOTES
Cardiology Progress Note    Date of Service: 03/07/2019      Reason for visit: [facility] clinic follow up, chronic systolic heart failure.    Primary cardiologist: Dr. Justin Tomlin      HPI:  Mr. Elder is a pleasant 68 year old male previously followed by Dr. Mckeon, with a PMHx including DM2, CVA, untreated MARISOL, and renal dysfunction. From a cardiac standpoint, he has chronic afib (on AC), and known coronary disease with previous MI/stenting and resultant ischemic CMO with chronic ejection fraction of 35-40%. He has problems with peripheral wounds from his diabetes and recent gangrene of his foot. He was admitted in early Jan 2019 for osteomyelitis. This stay was complicated by ABNER with IV antibiotics and his long term clopidogrel was stopped. He presented back shortly after, with weight gain and ANDREWS. He was again hospitalized and treated for a CHF exacerbation. He was not on diuresis prior to that admission. During that stay he was diuresed ~30 pounds. Echo showed no new WMAs and his EF remained in the 35-40% range.     He then saw Dr. Tomlin for a CORE MD evaluation in early Feb 2019. At that time his weight was down an additional 5 lbs from discharge, and he was noting weakness and dizzy spells. At that visit, he was felt over diuresed, and he asked him to hold his scheduled Lasix x 2 days and resume at 40mg daily as needed with a goal weight of 230-232 lbs. When I saw him about a week later, his weight was starting to trend back up, at 237 lbs on his home scale. Overall, however, he felt improved. He has continued to struggle with blood sugar control, running 200-250s. At that visit I continued his lasix at 40mg daily, but held his amlodipine to allow his BP to drift up and see if it would help with fluid retention issues. He's back today for short term follow up.    Today, his weight is up on our scale to 246 lbs. His home weights are fluctuating frequently, anywhere from 230 lbs to 241 lbs, so they are  "difficult to interpret. He tells me he is feeling much better overall after stopping amlodipine and has had no further dizzy spells. His BP today remains under excellent control at 106/68. Despite the weight increase he thinks his swelling is better as well. He is not terribly forthcoming with his sodium intake, just stating he \"never uses it.\" He denies ANDREWS or orthopnea. He also denies any further chest discomfort. Labs show continued improvement in his renal function since his recent hospital stay. He is now off antibiotics altogether. Of note, he has decided he does not want to see a nephrologist at this juncture mostly due to cost/insurance issues and thinks he \"doesn't need it.\"  Apparently his blood sugars are still running mid 200s but he has not contacted Dr. Lua's office because he doesn't have another appt until June.       ASSESSMENT/PLAN:    1.  Chronic chronic systolic heart failure, ischemic cardiomyopathy.   --Known chronic EF 35-40%, not significantly changed per echo with recent admit Jan 2019.    --Baseline weight is still a bit difficult due significant fluctuations on home scale. Based on symptoms, he is doing well on current dose of lasix. Initially thought to be on 40mg, but of note, he is actually taking 80mg daily, which was confirmed after his visit. As clinically he is doing well and renal function improving, will continue this without change. If further issues, will likely transition to torsemide in the future.     --I encouraged continued sodium restriction, though I'm not convinced he is monitoring this at home.    --As his renal function is improved (now off all abx), will resume lisinopril. Given softer BP, will start at 5mg daily. He may remain off the amlodipine. Continue carvedilol 25mg BID without change. Recheck labs in 1 week. Of note, he has chosen not to follow up with nephrology mostly due to cost issues.    --I again discussed how ongoing poor DM control can contribute to " cardiac dysfunction. I've asked him to call Dr. Lua's office and provide him an update on his home sugars.     2. Chronic atrial fibrillation.   --Appears chronically rate controlled, asymptomatic. On carvedilol as above.   --Continue Eliquis, though increase dose back to 5mg BID as renal function improving.      3. Coronary artery disease.   --No ischemic symptoms currently. Last intervention June 2017 involving an obtuse marginal with stent placement. Other stents were widely patent at that time.    --He is now no longer on clopidogrel, which was stopped at recent hospital visit. He is currently on AC as above. Will d/w Dr. Tomlin whether he would like him on an ASA as well.  [Addendum: 3/19/19: D/w Dr. Tomlin; will resume ASA 81mg in addition to his Eliuquis. Staff to call and inform pt.].   --Continue atorvastatin 40mg at bedtime. Last LDL under excellent control at 33 Jan 2019.    --He is chronically on Imdur, I believe due to the chronic chest discomfort. Will continue for now.        Follow up plan:  Labs in 1 week, and formal CORE f/u with myself in 1 month.        Orders this Visit:  Orders Placed This Encounter   Procedures     Basic metabolic panel     Basic metabolic panel     Follow-Up with [facility] - [facility] visit     Orders Placed This Encounter   Medications     apixaban ANTICOAGULANT (ELIQUIS) 5 MG tablet     Sig: Take 1 tablet (5 mg) by mouth 2 times daily     lisinopril (PRINIVIL/ZESTRIL) 5 MG tablet     Sig: Take 1 tablet (5 mg) by mouth daily     Dispense:  90 tablet     Refill:  3     Medications Discontinued During This Encounter   Medication Reason     cefTRIAXone (ROCEPHIN) 1 GM vial      insulin aspart (NOVOLOG FLEXPEN) 100 UNIT/ML pen      insulin glargine (LANTUS SOLOSTAR PEN) 100 UNIT/ML pen      apixaban ANTICOAGULANT (ELIQUIS) 2.5 MG tablet Dose adjustment           CURRENT MEDICATIONS:  Current Outpatient Medications   Medication Sig Dispense Refill     apixaban ANTICOAGULANT  (ELIQUIS) 5 MG tablet Take 1 tablet (5 mg) by mouth 2 times daily       atorvastatin (LIPITOR) 40 MG tablet Take 1 tablet (40 mg) by mouth every evening 90 tablet 3     carvedilol (COREG) 25 MG tablet Take 1 tablet (25 mg) by mouth 2 times daily (with meals) 180 tablet 3     Cholecalciferol (VITAMIN D3) 2000 units TABS Take 1 tablet by mouth daily       furosemide (LASIX) 40 MG tablet Take 1 tablet (40 mg) by mouth daily (Patient taking differently: Take 40 mg by mouth daily Pt taking 60 mg daily) 90 tablet 3     insulin pen needle 31G X 6 MM Use  as directed. 100 each prn     isosorbide mononitrate (IMDUR) 30 MG 24 hr tablet Take 1 tablet (30 mg) by mouth daily 90 tablet 1     levothyroxine (SYNTHROID, LEVOTHROID) 137 MCG tablet Take 137 mcg by mouth At Bedtime  90 tablet 3     lisinopril (PRINIVIL/ZESTRIL) 5 MG tablet Take 1 tablet (5 mg) by mouth daily 90 tablet 3     pantoprazole (PROTONIX) 40 MG enteric coated tablet Take 1 tablet (40 mg) by mouth every morning (before breakfast) 30 tablet 0     potassium chloride ER (K-DUR/KLOR-CON M) 10 MEQ CR tablet Take 1 tablet (10 mEq) by mouth 2 times daily 60 tablet 2     nitroglycerin (NITROSTAT) 0.4 MG sublingual tablet Place 1 tablet (0.4 mg) under the tongue every 5 minutes as needed for chest pain (Patient not taking: Reported on 2/14/2019) 50 tablet 0     order for DME Equipment being ordered: Other: surgical shoe male XL  Treatment Diagnosis: sp right 2nd toe amputaion (Patient not taking: Reported on 2/5/2019) 1 Device 0     order for DME Equipment being ordered: Walker Wheels () and Walker ()  Treatment Diagnosis: Impaired gait, heel WB bilaterally. 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     Cardiomyopathy (H)      Cellulitis of left lower extremity 3/13/2018     Cerebral infarction (H)     eye sight decreased in peripheral of right side and blurry     Diabetic ulcer of left midfoot associated with type 2 diabetes mellitus, with fat layer exposed (H) 3/13/2018     Essential hypertension, benign 11/13/2002     Generalized osteoarthrosis, unspecified site 11/13/2002     Microalbuminuria 3/13/2018    X1     Myocardial infarction (H)      Neuropathy      Sepsis (H)      Sleep apnea     doesn't use it     Substance abuse (H)      Syncope, unspecified syncope type 3/13/2018     Thyroid disease     takes medicine     Tobacco use disorder 11/13/2002     Type 2 diabetes mellitus with diabetic peripheral angiopathy without gangrene, unspecified long term insulin use status 2005       PAST SURGICAL HISTORY:  Past Surgical History:   Procedure Laterality Date     AMPUTATE TOE(S) Right 1/6/2019    Procedure: Right open second toe partial amputation;  Surgeon: Sabino Garcia DPM;  Location: [facility] OR     AMPUTATE TOE(S) Right 1/8/2019    Procedure: 1.  Revision right second toe amputation with resection of distal half of proximal phalanx with full closure.    2.  Debridement of callus/preulcerative lesion, distal left second toe and plantar left first metatarsophalangeal joint.;  Surgeon: Ryan Bhagat DPM;  Location: [facility] OR     ANGIOGRAM  6/29/05    subtotal occ.mid LAD,SUSAN mid LAD     ANGIOGRAM  7/05    mild CAD,patent stent,no flow-limiting lesions,sev.LV dysf.LAD enlarged     ANGIOGRAM  2/09    Sev.single vessel disease,Mod LV dysf.distal LAD 90%,70-75% mid lad just before prev stent,SUSAN to prox.mid LAD, endeavor to distal LAD     ANGIOGRAM  11/13/13    restenosis, stent LAD     BACK SURGERY      back surgery x3     C NONSPECIFIC PROCEDURE      Laminectomy x 3 - (1983 x 2 & 1990)     C NONSPECIFIC PROCEDURE Bilateral 1998    Bunionectomy     C NONSPECIFIC PROCEDURE   1959    Gingival surgery at age 9     HEART CATH LEFT HEART CATH  2017    SUSAN to OM3     INCISION AND DRAINAGE TOE, COMBINED Bilateral 2018    Procedure: COMBINED INCISION AND DRAINAGE TOE;  1) Irrigation and debridement ulcer left great toe  2) Amputation 3rd toe right foot at distal interphalangeal joint level;  Surgeon: Miki Harp MD;  Location: [facility] OR     ORTHOPEDIC SURGERY      bunion surgery both feet     ORTHOPEDIC SURGERY      left shoulder     SOFT TISSUE SURGERY      debridement of toe numerous time     VASCULAR SURGERY      7 stents in heart       FAMILY HISTORY:  Family History   Problem Relation Age of Onset     Cancer - colorectal Sister      Thyroid Disease Brother      Cardiovascular Brother      Diabetes Brother      Cancer Father         tumor in chest and throat     Arthritis Mother      Thyroid Disease Mother      Diabetes Mother      Glaucoma No family hx of      Macular Degeneration No family hx of        SOCIAL HISTORY:  Social History     Socioeconomic History     Marital status:      Spouse name: None     Number of children: None     Years of education: None     Highest education level: None   Occupational History     None   Social Needs     Financial resource strain: None     Food insecurity:     Worry: None     Inability: None     Transportation needs:     Medical: None     Non-medical: None   Tobacco Use     Smoking status: Former Smoker     Packs/day: 1.00     Years: 25.00     Pack years: 25.00     Types: Cigarettes     Last attempt to quit: 2005     Years since quittin.6     Smokeless tobacco: Never Used   Substance and Sexual Activity     Alcohol use: Yes     Alcohol/week: 2.4 oz     Types: 4 Shots of liquor per week     Comment: almost every day     Drug use: No     Sexual activity: Yes     Partners: Female   Lifestyle     Physical activity:     Days per week: None     Minutes per session: None     Stress: None   Relationships     Social  "connections:     Talks on phone: None     Gets together: None     Attends Mu-ism service: None     Active member of club or organization: None     Attends meetings of clubs or organizations: None     Relationship status: None     Intimate partner violence:     Fear of current or ex partner: None     Emotionally abused: None     Physically abused: None     Forced sexual activity: None   Other Topics Concern     Parent/sibling w/ CABG, MI or angioplasty before 65F 55M? Yes      Service Not Asked     Blood Transfusions Not Asked     Caffeine Concern No     Comment: 3 cups daily     Occupational Exposure Not Asked     Hobby Hazards Not Asked     Sleep Concern Not Asked     Stress Concern Not Asked     Weight Concern Not Asked     Special Diet Yes     Comment: watching carb intake     Back Care Not Asked     Exercise No     Comment: some walking     Bike Helmet Not Asked     Seat Belt Not Asked     Self-Exams Not Asked   Social History Narrative     None       Review of Systems:  Cardiovascular: neg chest pain, palpitations, orthopnea, PND, LE edema  Constitutional: negative for chills, sweats, fevers.   Resp: Negative for dyspnea at rest, dyspnea on exertion, cough, known chronic lung disease  HEENT: Negative for new visual changes, neg new headaches.  Gastrointestinal: negative for abdominal pain, diarrhea, nausea, vomiting  Hematologic/lymphatic: pos for current systemic anticoagulation, hx of CVA   Musculoskeletal: negative for new back pain, joint pain. Pos foot pain.  Neurological: negative for focal weakness, LOC, seizures, syncope/presyncope.      Physical Exam:  Vitals: /68 (BP Location: Right arm, Patient Position: Chair, Cuff Size: Adult Regular)   Pulse 80   Ht 1.93 m (6' 4\")   Wt 111.9 kg (246 lb 9.6 oz)   SpO2 99%   BMI 30.02 kg/m     Wt Readings from Last 4 Encounters:   03/07/19 111.9 kg (246 lb 9.6 oz)   02/14/19 109.9 kg (242 lb 4.8 oz)   02/05/19 105.7 kg (233 lb 1.6 oz) "   01/25/19 108.2 kg (238 lb 8 oz)       GEN:  In general, this is a well nourished  male in no acute distress on room air.  Patient ambulatory, accompanied by his wife.  HEENT:  Pupils equal, round. Sclerae nonicteric.   NECK: Supple, no masses appreciated. Trachea midline. No JVD.   C/V:  Irregular rhythm, No murmur, rub or gallop.   RESP: Respirations are unlabored. No use of accessory muscles. Clear to auscultation bilaterally without wheezing, rales, or rhonchi.  GI: Abdomen soft, nontender, nondistended.   EXTREM: Trace bilateral nonpitting LE edema. No cyanosis or clubbing.  NEURO: Alert and oriented, cooperative. Gait not formally assessed. No obvious focal deficits.   PSYCH: Normal affect.  SKIN: Warm and dry. No rashes or petechiae appreciated.       Recent Lab Results:  LIPID RESULTS:  Lab Results   Component Value Date    CHOL 81 01/18/2019    HDL 32 (L) 01/18/2019    LDL 33 01/18/2019    TRIG 83 01/18/2019       BMP RESULTS:  Lab Results   Component Value Date     03/07/2019    POTASSIUM 3.7 03/07/2019    CHLORIDE 101 03/07/2019    CO2 30 03/07/2019    ANIONGAP 4 03/07/2019     (H) 03/07/2019    BUN 19 03/07/2019    CR 1.38 (H) 03/07/2019    GFRESTIMATED 52 (L) 03/07/2019    GFRESTBLACK 60 (L) 03/07/2019    BETTIE 8.4 (L) 03/07/2019        New/Pertinent imaging results since last visit:  Echo 1/18/19  Interpretation Summary  Moderately (EF 35-40%) reduced left ventricular function is present. Regional  wall abnormality involving the septum, apex and inferior walls. No LV apical  thrombus.  Global right ventricular function is normal. The right ventricle is normal  size.  The inferior vena cava was dilated at 2.6 cm without respiratory variability,  consistent with increased right atrial pressure.  Estimated mean right atrial pressure is 15 mmHg (significantly elevated).  No significant valvular dysfunction.  No pericardial effusion is present.  Rhythm was atrial fibrillation during the  study.     This study was compared with the study from 9/8/2018 .  IVC doesnot collapse in this study. RA pressure is more elevated. RWA is  similar.        Gill Doty PA-C  [facility]  Pager [phone]

## 2019-03-07 NOTE — LETTER
3/7/2019    Physician No Ref-Primary  No address on file    RE: Jayro Elder       Dear Colleague,    I had the pleasure of seeing Jayro Elder in the [facility].      Cardiology Progress Note    Date of Service: 03/07/2019      Reason for visit: CORE clinic follow up, chronic systolic heart failure.    Primary cardiologist: Dr. Justin Tomlin      HPI:  Mr. Elder is a pleasant 68 year old male previously followed by Dr. Mckeon, with a PMHx including DM2, CVA, untreated MARISOL, and renal dysfunction. From a cardiac standpoint, he has chronic afib (on AC), and known coronary disease with previous MI/stenting and resultant ischemic CMO with chronic ejection fraction of 35-40%. He has problems with peripheral wounds from his diabetes and recent gangrene of his foot. He was admitted in early Jan 2019 for osteomyelitis. This stay was complicated by ABNER with IV antibiotics and his long term clopidogrel was stopped. He presented back shortly after, with weight gain and ANDREWS. He was again hospitalized and treated for a CHF exacerbation. He was not on diuresis prior to that admission. During that stay he was diuresed ~30 pounds. Echo showed no new WMAs and his EF remained in the 35-40% range.     He then saw Dr. Tomlin for a CORE MD evaluation in early Feb 2019. At that time his weight was down an additional 5 lbs from discharge, and he was noting weakness and dizzy spells. At that visit, he was felt over diuresed, and he asked him to hold his scheduled Lasix x 2 days and resume at 40mg daily as needed with a goal weight of 230-232 lbs. When I saw him about a week later, his weight was starting to trend back up, at 237 lbs on his home scale. Overall, however, he felt improved. He has continued to struggle with blood sugar control, running 200-250s. At that visit I continued his lasix at 40mg daily, but held his amlodipine to allow his BP to drift up and see if it would help with fluid  "retention issues. He's back today for short term follow up.    Today, his weight is up on our scale to 246 lbs. His home weights are fluctuating frequently, anywhere from 230 lbs to 241 lbs, so they are difficult to interpret. He tells me he is feeling much better overall after stopping amlodipine and has had no further dizzy spells. His BP today remains under excellent control at 106/68. Despite the weight increase he thinks his swelling is better as well. He is not terribly forthcoming with his sodium intake, just stating he \"never uses it.\" He denies ANDREWS or orthopnea. He also denies any further chest discomfort. Labs show continued improvement in his renal function since his recent hospital stay. He is now off antibiotics altogether. Of note, he has decided he does not want to see a nephrologist at this juncture mostly due to cost/insurance issues and thinks he \"doesn't need it.\"  Apparently his blood sugars are still running mid 200s but he has not contacted Dr. Lua's office because he doesn't have another appt until June.       ASSESSMENT/PLAN:    1.  Chronic chronic systolic heart failure, ischemic cardiomyopathy.   --Known chronic EF 35-40%, not significantly changed per echo with recent admit Jan 2019.    --Baseline weight is still a bit difficult due significant fluctuations on home scale. Based on symptoms, he is doing well on current dose of lasix. Initially thought to be on 40mg, but of note, he is actually taking 80mg daily, which was confirmed after his visit. As clinically he is doing well and renal function improving, will continue this without change. If further issues, will likely transition to torsemide in the future.     --I encouraged continued sodium restriction, though I'm not convinced he is monitoring this at home.    --As his renal function is improved (now off all abx), will resume lisinopril. Given softer BP, will start at 5mg daily. He may remain off the amlodipine. Continue " carvedilol 25mg BID without change. Recheck labs in 1 week. Of note, he has chosen not to follow up with nephrology mostly due to cost issues.    --I again discussed how ongoing poor DM control can contribute to cardiac dysfunction. I've asked him to call Dr. Lua's office and provide him an update on his home sugars.     2. Chronic atrial fibrillation.   --Appears chronically rate controlled, asymptomatic. On carvedilol as above.   --Continue Eliquis, though increase dose back to 5mg BID as renal function improving.      3. Coronary artery disease.   --No ischemic symptoms currently. Last intervention June 2017 involving an obtuse marginal with stent placement. Other stents were widely patent at that time.    --He is now no longer on clopidogrel, which was stopped at recent hospital visit. He is currently on AC as above. Will d/w Dr. Tomlin whether he would like him on an ASA as well.    --Continue atorvastatin 40mg at bedtime. Last LDL under excellent control at 33 Jan 2019.    --He is chronically on Imdur, I believe due to the chronic chest discomfort. Will continue for now.        Follow up plan:  Labs in 1 week, and formal CORE f/u with myself in 1 month.        Orders this Visit:  Orders Placed This Encounter   Procedures     Basic metabolic panel     Basic metabolic panel     Follow-Up with [facility] - [facility] visit     Orders Placed This Encounter   Medications     apixaban ANTICOAGULANT (ELIQUIS) 5 MG tablet     Sig: Take 1 tablet (5 mg) by mouth 2 times daily     lisinopril (PRINIVIL/ZESTRIL) 5 MG tablet     Sig: Take 1 tablet (5 mg) by mouth daily     Dispense:  90 tablet     Refill:  3     Medications Discontinued During This Encounter   Medication Reason     cefTRIAXone (ROCEPHIN) 1 GM vial      insulin aspart (NOVOLOG FLEXPEN) 100 UNIT/ML pen      insulin glargine (LANTUS SOLOSTAR PEN) 100 UNIT/ML pen      apixaban ANTICOAGULANT (ELIQUIS) 2.5 MG tablet Dose adjustment           CURRENT  MEDICATIONS:  Current Outpatient Medications   Medication Sig Dispense Refill     apixaban ANTICOAGULANT (ELIQUIS) 5 MG tablet Take 1 tablet (5 mg) by mouth 2 times daily       atorvastatin (LIPITOR) 40 MG tablet Take 1 tablet (40 mg) by mouth every evening 90 tablet 3     carvedilol (COREG) 25 MG tablet Take 1 tablet (25 mg) by mouth 2 times daily (with meals) 180 tablet 3     Cholecalciferol (VITAMIN D3) 2000 units TABS Take 1 tablet by mouth daily       furosemide (LASIX) 40 MG tablet Take 1 tablet (40 mg) by mouth daily (Patient taking differently: Take 40 mg by mouth daily Pt taking 60 mg daily) 90 tablet 3     insulin pen needle 31G X 6 MM Use  as directed. 100 each prn     isosorbide mononitrate (IMDUR) 30 MG 24 hr tablet Take 1 tablet (30 mg) by mouth daily 90 tablet 1     levothyroxine (SYNTHROID, LEVOTHROID) 137 MCG tablet Take 137 mcg by mouth At Bedtime  90 tablet 3     lisinopril (PRINIVIL/ZESTRIL) 5 MG tablet Take 1 tablet (5 mg) by mouth daily 90 tablet 3     pantoprazole (PROTONIX) 40 MG enteric coated tablet Take 1 tablet (40 mg) by mouth every morning (before breakfast) 30 tablet 0     potassium chloride ER (K-DUR/KLOR-CON M) 10 MEQ CR tablet Take 1 tablet (10 mEq) by mouth 2 times daily 60 tablet 2     nitroglycerin (NITROSTAT) 0.4 MG sublingual tablet Place 1 tablet (0.4 mg) under the tongue every 5 minutes as needed for chest pain (Patient not taking: Reported on 2/14/2019) 50 tablet 0     order for DME Equipment being ordered: Other: surgical shoe male XL  Treatment Diagnosis: sp right 2nd toe amputaion (Patient not taking: Reported on 2/5/2019) 1 Device 0     order for DME Equipment being ordered: Walker Wheels () and Walker ()  Treatment Diagnosis: Impaired gait, heel WB bilaterally. 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     Cardiomyopathy (H)      Cellulitis of left lower extremity 3/13/2018     Cerebral infarction (H)     eye sight decreased in peripheral of right side and blurry     Diabetic ulcer of left midfoot associated with type 2 diabetes mellitus, with fat layer exposed (H) 3/13/2018     Essential hypertension, benign 11/13/2002     Generalized osteoarthrosis, unspecified site 11/13/2002     Microalbuminuria 3/13/2018    X1     Myocardial infarction (H)      Neuropathy      Sepsis (H)      Sleep apnea     doesn't use it     Substance abuse (H)      Syncope, unspecified syncope type 3/13/2018     Thyroid disease     takes medicine     Tobacco use disorder 11/13/2002     Type 2 diabetes mellitus with diabetic peripheral angiopathy without gangrene, unspecified long term insulin use status 2005       PAST SURGICAL HISTORY:  Past Surgical History:   Procedure Laterality Date     AMPUTATE TOE(S) Right 1/6/2019    Procedure: Right open second toe partial amputation;  Surgeon: Sabino Garcia DPM;  Location: [facility] OR     AMPUTATE TOE(S) Right 1/8/2019    Procedure: 1.  Revision right second toe amputation with resection of distal half of proximal phalanx with full closure.    2.  Debridement of callus/preulcerative lesion, distal left second toe and plantar left first metatarsophalangeal joint.;  Surgeon: Ryan Bhagat DPM;  Location: [facility] OR     ANGIOGRAM  6/29/05    subtotal occ.mid LAD,SUSAN mid LAD     ANGIOGRAM  7/05    mild CAD,patent stent,no flow-limiting lesions,sev.LV dysf.LAD enlarged     ANGIOGRAM  2/09    Sev.single vessel disease,Mod LV dysf.distal LAD 90%,70-75% mid lad just before prev stent,SUSAN to prox.mid LAD, endeavor to distal LAD     ANGIOGRAM  11/13/13    restenosis, stent LAD     BACK SURGERY      back surgery x3     C NONSPECIFIC PROCEDURE      Laminectomy x 3 - (1983  x 2 & )     C NONSPECIFIC PROCEDURE Bilateral     Bunionectomy     C NONSPECIFIC PROCEDURE      Gingival surgery at age 9     HEART CATH LEFT HEART CATH  2017    SUSAN to OM3     INCISION AND DRAINAGE TOE, COMBINED Bilateral 2018    Procedure: COMBINED INCISION AND DRAINAGE TOE;  1) Irrigation and debridement ulcer left great toe  2) Amputation 3rd toe right foot at distal interphalangeal joint level;  Surgeon: Miki Harp MD;  Location: [facility] OR     ORTHOPEDIC SURGERY      bunion surgery both feet     ORTHOPEDIC SURGERY      left shoulder     SOFT TISSUE SURGERY      debridement of toe numerous time     VASCULAR SURGERY      7 stents in heart       FAMILY HISTORY:  Family History   Problem Relation Age of Onset     Cancer - colorectal Sister      Thyroid Disease Brother      Cardiovascular Brother      Diabetes Brother      Cancer Father         tumor in chest and throat     Arthritis Mother      Thyroid Disease Mother      Diabetes Mother      Glaucoma No family hx of      Macular Degeneration No family hx of        SOCIAL HISTORY:  Social History     Socioeconomic History     Marital status:      Spouse name: None     Number of children: None     Years of education: None     Highest education level: None   Occupational History     None   Social Needs     Financial resource strain: None     Food insecurity:     Worry: None     Inability: None     Transportation needs:     Medical: None     Non-medical: None   Tobacco Use     Smoking status: Former Smoker     Packs/day: 1.00     Years: 25.00     Pack years: 25.00     Types: Cigarettes     Last attempt to quit: 2005     Years since quittin.6     Smokeless tobacco: Never Used   Substance and Sexual Activity     Alcohol use: Yes     Alcohol/week: 2.4 oz     Types: 4 Shots of liquor per week     Comment: almost every day     Drug use: No     Sexual activity: Yes     Partners: Female   Lifestyle     Physical activity:      "Days per week: None     Minutes per session: None     Stress: None   Relationships     Social connections:     Talks on phone: None     Gets together: None     Attends Buddhism service: None     Active member of club or organization: None     Attends meetings of clubs or organizations: None     Relationship status: None     Intimate partner violence:     Fear of current or ex partner: None     Emotionally abused: None     Physically abused: None     Forced sexual activity: None   Other Topics Concern     Parent/sibling w/ CABG, MI or angioplasty before 65F 55M? Yes      Service Not Asked     Blood Transfusions Not Asked     Caffeine Concern No     Comment: 3 cups daily     Occupational Exposure Not Asked     Hobby Hazards Not Asked     Sleep Concern Not Asked     Stress Concern Not Asked     Weight Concern Not Asked     Special Diet Yes     Comment: watching carb intake     Back Care Not Asked     Exercise No     Comment: some walking     Bike Helmet Not Asked     Seat Belt Not Asked     Self-Exams Not Asked   Social History Narrative     None       Review of Systems:  Cardiovascular: neg chest pain, palpitations, orthopnea, PND, LE edema  Constitutional: negative for chills, sweats, fevers.   Resp: Negative for dyspnea at rest, dyspnea on exertion, cough, known chronic lung disease  HEENT: Negative for new visual changes, neg new headaches.  Gastrointestinal: negative for abdominal pain, diarrhea, nausea, vomiting  Hematologic/lymphatic: pos for current systemic anticoagulation, hx of CVA   Musculoskeletal: negative for new back pain, joint pain. Pos foot pain.  Neurological: negative for focal weakness, LOC, seizures, syncope/presyncope.      Physical Exam:  Vitals: /68 (BP Location: Right arm, Patient Position: Chair, Cuff Size: Adult Regular)   Pulse 80   Ht 1.93 m (6' 4\")   Wt 111.9 kg (246 lb 9.6 oz)   SpO2 99%   BMI 30.02 kg/m      Wt Readings from Last 4 Encounters:   03/07/19 111.9 kg " (246 lb 9.6 oz)   02/14/19 109.9 kg (242 lb 4.8 oz)   02/05/19 105.7 kg (233 lb 1.6 oz)   01/25/19 108.2 kg (238 lb 8 oz)       GEN:  In general, this is a well nourished  male in no acute distress on room air.  Patient ambulatory, accompanied by his wife.  HEENT:  Pupils equal, round. Sclerae nonicteric.   NECK: Supple, no masses appreciated. Trachea midline. No JVD.   C/V:  Irregular rhythm, No murmur, rub or gallop.   RESP: Respirations are unlabored. No use of accessory muscles. Clear to auscultation bilaterally without wheezing, rales, or rhonchi.  GI: Abdomen soft, nontender, nondistended.   EXTREM: Trace bilateral nonpitting LE edema. No cyanosis or clubbing.  NEURO: Alert and oriented, cooperative. Gait not formally assessed. No obvious focal deficits.   PSYCH: Normal affect.  SKIN: Warm and dry. No rashes or petechiae appreciated.       Recent Lab Results:  LIPID RESULTS:  Lab Results   Component Value Date    CHOL 81 01/18/2019    HDL 32 (L) 01/18/2019    LDL 33 01/18/2019    TRIG 83 01/18/2019       BMP RESULTS:  Lab Results   Component Value Date     03/07/2019    POTASSIUM 3.7 03/07/2019    CHLORIDE 101 03/07/2019    CO2 30 03/07/2019    ANIONGAP 4 03/07/2019     (H) 03/07/2019    BUN 19 03/07/2019    CR 1.38 (H) 03/07/2019    GFRESTIMATED 52 (L) 03/07/2019    GFRESTBLACK 60 (L) 03/07/2019    BETTIE 8.4 (L) 03/07/2019        New/Pertinent imaging results since last visit:  Echo 1/18/19  Interpretation Summary  Moderately (EF 35-40%) reduced left ventricular function is present. Regional  wall abnormality involving the septum, apex and inferior walls. No LV apical  thrombus.  Global right ventricular function is normal. The right ventricle is normal  size.  The inferior vena cava was dilated at 2.6 cm without respiratory variability,  consistent with increased right atrial pressure.  Estimated mean right atrial pressure is 15 mmHg (significantly elevated).  No significant valvular  dysfunction.  No pericardial effusion is present.  Rhythm was atrial fibrillation during the study.     This study was compared with the study from 9/8/2018 .  IVC doesnot collapse in this study. RA pressure is more elevated. RWA is  similar.        Gill Doty PA-C  [facility]  Pager [phone]          Thank you for allowing me to participate in the care of your patient.    Sincerely,     CONCEPCION Dasilva     [facility]

## 2019-03-07 NOTE — PROGRESS NOTES
Response from Gill:   Gill Doty PA Gerdowsky, Christina, RN   Caller: Unspecified (Today,  9:58 AM)             Wow, that's more than I thought, but he's doing well and kidney function is getting better   Go ahead and refill for 80mg daily.   Plan stays the same, labs in 1 week, return in 1 month.     Thanks   Gill      New prescription sent in and pt notified to continue 80 mg daily. Prescription sent to [facility] per pt request  Princess Chapa RN 10:24 AM 03/07/19

## 2019-03-07 NOTE — PATIENT INSTRUCTIONS
Call C.O.R.CAITLYN. nurse for any questions or concerns Mon-Fri 8am-4pm:  [phone]  For concerns after hours: [phone]    Medication changes:   INCREASE your Eliquis to 5mg twice daily.  START lisinopril 5mg daily (prescription sent).  CONTINUE current dose of furosemide (Lasix); PLEASE CALL [phone] and report back what the bottle says.    Plan from today:  Recheck labs in 1 week, return to see Gill in 1 month.  Please call Dr. Lua's office---let him know your blood sugars are still running high and they can recommend a diabetic eye doctor as well.     Lab results: Reviewed during your visit today--kidney function is improving.

## 2019-03-07 NOTE — PROGRESS NOTES
Pt called to update CORE on his home dose of furosemide. He is taking 80 mg daily. Verified that pt is taking a whole tablet and not half tablet. Confirms he is taking a whole tablet once daily.     Pt was started on 80 mg BID at hospital discharge. Pt saw Dr Tomlin on 2/5/19 and was asked to hold furosemide 2/5 & 2/6 and restart at 40 mg daily. Pt was to continue 40 mg daily when he saw Gill on 2/14.     Will update Gill

## 2019-03-07 NOTE — LETTER
3/7/2019    Davion Cordova PA-C  [address] [facility] [address]    RE: Jayro Jainlala       Dear Colleague,    I had the pleasure of seeing Jayro Elder in the [facility].      Cardiology Progress Note    Date of Service: 03/07/2019      Reason for visit: CORE clinic follow up, chronic systolic heart failure.    Primary cardiologist: Dr. Justin Tomlin      HPI:  Mr. Elder is a pleasant 68 year old male previously followed by Dr. Mckeon, with a PMHx including DM2, CVA, untreated MARISOL, and renal dysfunction. From a cardiac standpoint, he has chronic afib (on AC), and known coronary disease with previous MI/stenting and resultant ischemic CMO with chronic ejection fraction of 35-40%. He has problems with peripheral wounds from his diabetes and recent gangrene of his foot. He was admitted in early Jan 2019 for osteomyelitis. This stay was complicated by ABNER with IV antibiotics and his long term clopidogrel was stopped. He presented back shortly after, with weight gain and ANDREWS. He was again hospitalized and treated for a CHF exacerbation. He was not on diuresis prior to that admission. During that stay he was diuresed ~30 pounds. Echo showed no new WMAs and his EF remained in the 35-40% range.     He then saw Dr. Tomlin for a CORE MD evaluation in early Feb 2019. At that time his weight was down an additional 5 lbs from discharge, and he was noting weakness and dizzy spells. At that visit, he was felt over diuresed, and he asked him to hold his scheduled Lasix x 2 days and resume at 40mg daily as needed with a goal weight of 230-232 lbs. When I saw him about a week later, his weight was starting to trend back up, at 237 lbs on his home scale. Overall, however, he felt improved. He has continued to struggle with blood sugar control, running 200-250s. At that visit I continued his lasix at 40mg daily, but held his amlodipine to allow his BP to drift up and see if it  "would help with fluid retention issues. He's back today for short term follow up.    Today, his weight is up on our scale to 246 lbs. His home weights are fluctuating frequently, anywhere from 230 lbs to 241 lbs, so they are difficult to interpret. He tells me he is feeling much better overall after stopping amlodipine and has had no further dizzy spells. His BP today remains under excellent control at 106/68. Despite the weight increase he thinks his swelling is better as well. He is not terribly forthcoming with his sodium intake, just stating he \"never uses it.\" He denies ANDREWS or orthopnea. He also denies any further chest discomfort. Labs show continued improvement in his renal function since his recent hospital stay. He is now off antibiotics altogether. Of note, he has decided he does not want to see a nephrologist at this juncture mostly due to cost/insurance issues and thinks he \"doesn't need it.\"  Apparently his blood sugars are still running mid 200s but he has not contacted Dr. Lua's office because he doesn't have another appt until June.       ASSESSMENT/PLAN:    1.  Chronic chronic systolic heart failure, ischemic cardiomyopathy.   --Known chronic EF 35-40%, not significantly changed per echo with recent admit Jan 2019.    --Baseline weight is still a bit difficult due significant fluctuations on home scale. Based on symptoms, he is doing well on current dose of lasix. Initially thought to be on 40mg, but of note, he is actually taking 80mg daily, which was confirmed after his visit. As clinically he is doing well and renal function improving, will continue this without change. If further issues, will likely transition to torsemide in the future.     --I encouraged continued sodium restriction, though I'm not convinced he is monitoring this at home.    --As his renal function is improved (now off all abx), will resume lisinopril. Given softer BP, will start at 5mg daily. He may remain off the " amlodipine. Continue carvedilol 25mg BID without change. Recheck labs in 1 week. Of note, he has chosen not to follow up with nephrology mostly due to cost issues.    --I again discussed how ongoing poor DM control can contribute to cardiac dysfunction. I've asked him to call Dr. Lua's office and provide him an update on his home sugars.     2. Chronic atrial fibrillation.   --Appears chronically rate controlled, asymptomatic. On carvedilol as above.   --Continue Eliquis, though increase dose back to 5mg BID as renal function improving.      3. Coronary artery disease.   --No ischemic symptoms currently. Last intervention June 2017 involving an obtuse marginal with stent placement. Other stents were widely patent at that time.    --He is now no longer on clopidogrel, which was stopped at recent hospital visit. He is currently on AC as above. Will d/w Dr. Tomlin whether he would like him on an ASA as well.    --Continue atorvastatin 40mg at bedtime. Last LDL under excellent control at 33 Jan 2019.    --He is chronically on Imdur, I believe due to the chronic chest discomfort. Will continue for now.        Follow up plan:  Labs in 1 week, and formal CORE f/u with myself in 1 month.        Orders this Visit:  Orders Placed This Encounter   Procedures     Basic metabolic panel     Basic metabolic panel     Follow-Up with CORE Clinic - FLORA visit     Orders Placed This Encounter   Medications     apixaban ANTICOAGULANT (ELIQUIS) 5 MG tablet     Sig: Take 1 tablet (5 mg) by mouth 2 times daily     lisinopril (PRINIVIL/ZESTRIL) 5 MG tablet     Sig: Take 1 tablet (5 mg) by mouth daily     Dispense:  90 tablet     Refill:  3     Medications Discontinued During This Encounter   Medication Reason     cefTRIAXone (ROCEPHIN) 1 GM vial      insulin aspart (NOVOLOG FLEXPEN) 100 UNIT/ML pen      insulin glargine (LANTUS SOLOSTAR PEN) 100 UNIT/ML pen      apixaban ANTICOAGULANT (ELIQUIS) 2.5 MG tablet Dose adjustment            CURRENT MEDICATIONS:  Current Outpatient Medications   Medication Sig Dispense Refill     apixaban ANTICOAGULANT (ELIQUIS) 5 MG tablet Take 1 tablet (5 mg) by mouth 2 times daily       atorvastatin (LIPITOR) 40 MG tablet Take 1 tablet (40 mg) by mouth every evening 90 tablet 3     carvedilol (COREG) 25 MG tablet Take 1 tablet (25 mg) by mouth 2 times daily (with meals) 180 tablet 3     Cholecalciferol (VITAMIN D3) 2000 units TABS Take 1 tablet by mouth daily       furosemide (LASIX) 40 MG tablet Take 1 tablet (40 mg) by mouth daily (Patient taking differently: Take 40 mg by mouth daily Pt taking 60 mg daily) 90 tablet 3     insulin pen needle 31G X 6 MM Use  as directed. 100 each prn     isosorbide mononitrate (IMDUR) 30 MG 24 hr tablet Take 1 tablet (30 mg) by mouth daily 90 tablet 1     levothyroxine (SYNTHROID, LEVOTHROID) 137 MCG tablet Take 137 mcg by mouth At Bedtime  90 tablet 3     lisinopril (PRINIVIL/ZESTRIL) 5 MG tablet Take 1 tablet (5 mg) by mouth daily 90 tablet 3     pantoprazole (PROTONIX) 40 MG enteric coated tablet Take 1 tablet (40 mg) by mouth every morning (before breakfast) 30 tablet 0     potassium chloride ER (K-DUR/KLOR-CON M) 10 MEQ CR tablet Take 1 tablet (10 mEq) by mouth 2 times daily 60 tablet 2     nitroglycerin (NITROSTAT) 0.4 MG sublingual tablet Place 1 tablet (0.4 mg) under the tongue every 5 minutes as needed for chest pain (Patient not taking: Reported on 2/14/2019) 50 tablet 0     order for DME Equipment being ordered: Other: surgical shoe male XL  Treatment Diagnosis: sp right 2nd toe amputaion (Patient not taking: Reported on 2/5/2019) 1 Device 0     order for DME Equipment being ordered: Walker Wheels () and Walker ()  Treatment Diagnosis: Impaired gait, heel WB bilaterally. 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     Cardiomyopathy (H)      Cellulitis of left lower extremity 3/13/2018     Cerebral infarction (H)     eye sight decreased in peripheral of right side and blurry     Diabetic ulcer of left midfoot associated with type 2 diabetes mellitus, with fat layer exposed (H) 3/13/2018     Essential hypertension, benign 11/13/2002     Generalized osteoarthrosis, unspecified site 11/13/2002     Microalbuminuria 3/13/2018    X1     Myocardial infarction (H)      Neuropathy      Sepsis (H)      Sleep apnea     doesn't use it     Substance abuse (H)      Syncope, unspecified syncope type 3/13/2018     Thyroid disease     takes medicine     Tobacco use disorder 11/13/2002     Type 2 diabetes mellitus with diabetic peripheral angiopathy without gangrene, unspecified long term insulin use status 2005       PAST SURGICAL HISTORY:  Past Surgical History:   Procedure Laterality Date     AMPUTATE TOE(S) Right 1/6/2019    Procedure: Right open second toe partial amputation;  Surgeon: Sabino Garcia DPM;  Location: [facility] OR     AMPUTATE TOE(S) Right 1/8/2019    Procedure: 1.  Revision right second toe amputation with resection of distal half of proximal phalanx with full closure.    2.  Debridement of callus/preulcerative lesion, distal left second toe and plantar left first metatarsophalangeal joint.;  Surgeon: Ryan Bhagat DPM;  Location: [facility] OR     ANGIOGRAM  6/29/05    subtotal occ.mid LAD,SUSAN mid LAD     ANGIOGRAM  7/05    mild CAD,patent stent,no flow-limiting lesions,sev.LV dysf.LAD enlarged     ANGIOGRAM  2/09    Sev.single vessel disease,Mod LV dysf.distal LAD 90%,70-75% mid lad just before prev stent,SUSAN to prox.mid LAD, endeavor to distal LAD     ANGIOGRAM  11/13/13    restenosis, stent LAD     BACK SURGERY      back surgery x3     C NONSPECIFIC PROCEDURE       Laminectomy x 3 - (1983 x 2 & )     C NONSPECIFIC PROCEDURE Bilateral 1998    Bunionectomy     C NONSPECIFIC PROCEDURE  1959    Gingival surgery at age 9     HEART CATH LEFT HEART CATH  2017    SUSAN to OM3     INCISION AND DRAINAGE TOE, COMBINED Bilateral 2018    Procedure: COMBINED INCISION AND DRAINAGE TOE;  1) Irrigation and debridement ulcer left great toe  2) Amputation 3rd toe right foot at distal interphalangeal joint level;  Surgeon: Miki Harp MD;  Location: [facility] OR     ORTHOPEDIC SURGERY      bunion surgery both feet     ORTHOPEDIC SURGERY      left shoulder     SOFT TISSUE SURGERY      debridement of toe numerous time     VASCULAR SURGERY      7 stents in heart       FAMILY HISTORY:  Family History   Problem Relation Age of Onset     Cancer - colorectal Sister      Thyroid Disease Brother      Cardiovascular Brother      Diabetes Brother      Cancer Father         tumor in chest and throat     Arthritis Mother      Thyroid Disease Mother      Diabetes Mother      Glaucoma No family hx of      Macular Degeneration No family hx of        SOCIAL HISTORY:  Social History     Socioeconomic History     Marital status:      Spouse name: None     Number of children: None     Years of education: None     Highest education level: None   Occupational History     None   Social Needs     Financial resource strain: None     Food insecurity:     Worry: None     Inability: None     Transportation needs:     Medical: None     Non-medical: None   Tobacco Use     Smoking status: Former Smoker     Packs/day: 1.00     Years: 25.00     Pack years: 25.00     Types: Cigarettes     Last attempt to quit: 2005     Years since quittin.6     Smokeless tobacco: Never Used   Substance and Sexual Activity     Alcohol use: Yes     Alcohol/week: 2.4 oz     Types: 4 Shots of liquor per week     Comment: almost every day     Drug use: No     Sexual activity: Yes     Partners: Female   Lifestyle  "    Physical activity:     Days per week: None     Minutes per session: None     Stress: None   Relationships     Social connections:     Talks on phone: None     Gets together: None     Attends L[employer] service: None     Active member of club or organization: None     Attends meetings of clubs or organizations: None     Relationship status: None     Intimate partner violence:     Fear of current or ex partner: None     Emotionally abused: None     Physically abused: None     Forced sexual activity: None   Other Topics Concern     Parent/sibling w/ CABG, MI or angioplasty before 65F 55M? Yes      Service Not Asked     Blood Transfusions Not Asked     Caffeine Concern No     Comment: 3 cups daily     Occupational Exposure Not Asked     Hobby Hazards Not Asked     Sleep Concern Not Asked     Stress Concern Not Asked     Weight Concern Not Asked     Special Diet Yes     Comment: watching carb intake     Back Care Not Asked     Exercise No     Comment: some walking     Bike Helmet Not Asked     Seat Belt Not Asked     Self-Exams Not Asked   Social History Narrative     None       Review of Systems:  Cardiovascular: neg chest pain, palpitations, orthopnea, PND, LE edema  Constitutional: negative for chills, sweats, fevers.   Resp: Negative for dyspnea at rest, dyspnea on exertion, cough, known chronic lung disease  HEENT: Negative for new visual changes, neg new headaches.  Gastrointestinal: negative for abdominal pain, diarrhea, nausea, vomiting  Hematologic/lymphatic: pos for current systemic anticoagulation, hx of CVA   Musculoskeletal: negative for new back pain, joint pain. Pos foot pain.  Neurological: negative for focal weakness, LOC, seizures, syncope/presyncope.      Physical Exam:  Vitals: /68 (BP Location: Right arm, Patient Position: Chair, Cuff Size: Adult Regular)   Pulse 80   Ht 1.93 m (6' 4\")   Wt 111.9 kg (246 lb 9.6 oz)   SpO2 99%   BMI 30.02 kg/m      Wt Readings from Last 4 " Encounters:   03/07/19 111.9 kg (246 lb 9.6 oz)   02/14/19 109.9 kg (242 lb 4.8 oz)   02/05/19 105.7 kg (233 lb 1.6 oz)   01/25/19 108.2 kg (238 lb 8 oz)       GEN:  In general, this is a well nourished  male in no acute distress on room air.  Patient ambulatory, accompanied by his wife.  HEENT:  Pupils equal, round. Sclerae nonicteric.   NECK: Supple, no masses appreciated. Trachea midline. No JVD.   C/V:  Irregular rhythm, No murmur, rub or gallop.   RESP: Respirations are unlabored. No use of accessory muscles. Clear to auscultation bilaterally without wheezing, rales, or rhonchi.  GI: Abdomen soft, nontender, nondistended.   EXTREM: Trace bilateral nonpitting LE edema. No cyanosis or clubbing.  NEURO: Alert and oriented, cooperative. Gait not formally assessed. No obvious focal deficits.   PSYCH: Normal affect.  SKIN: Warm and dry. No rashes or petechiae appreciated.       Recent Lab Results:  LIPID RESULTS:  Lab Results   Component Value Date    CHOL 81 01/18/2019    HDL 32 (L) 01/18/2019    LDL 33 01/18/2019    TRIG 83 01/18/2019       BMP RESULTS:  Lab Results   Component Value Date     03/07/2019    POTASSIUM 3.7 03/07/2019    CHLORIDE 101 03/07/2019    CO2 30 03/07/2019    ANIONGAP 4 03/07/2019     (H) 03/07/2019    BUN 19 03/07/2019    CR 1.38 (H) 03/07/2019    GFRESTIMATED 52 (L) 03/07/2019    GFRESTBLACK 60 (L) 03/07/2019    BETTIE 8.4 (L) 03/07/2019        New/Pertinent imaging results since last visit:  Echo 1/18/19  Interpretation Summary  Moderately (EF 35-40%) reduced left ventricular function is present. Regional  wall abnormality involving the septum, apex and inferior walls. No LV apical  thrombus.  Global right ventricular function is normal. The right ventricle is normal  size.  The inferior vena cava was dilated at 2.6 cm without respiratory variability,  consistent with increased right atrial pressure.  Estimated mean right atrial pressure is 15 mmHg (significantly  elevated).  No significant valvular dysfunction.  No pericardial effusion is present.  Rhythm was atrial fibrillation during the study.     This study was compared with the study from 9/8/2018 .  IVC doesnot collapse in this study. RA pressure is more elevated. RWA is  similar.        Gill Doty PA-C  [facility]  Pager [phone]          Thank you for allowing me to participate in the care of your patient.      Sincerely,     CONCEPCION Dasilva     [facility]    cc:   CONCEPCION Dasilva  [facility]  [address] [place] [phone]

## 2019-03-10 ENCOUNTER — HOSPITAL ENCOUNTER (INPATIENT)
Facility: CLINIC | Age: 69
LOS: 5 days | Discharge: HOME | DRG: 617 | End: 2019-03-15
Attending: EMERGENCY MEDICINE | Admitting: STUDENT IN AN ORGANIZED HEALTH CARE EDUCATION/TRAINING PROGRAM
Payer: COMMERCIAL

## 2019-03-10 ENCOUNTER — APPOINTMENT (OUTPATIENT)
Dept: GENERAL RADIOLOGY | Facility: CLINIC | Age: 69
DRG: 617 | End: 2019-03-10
Attending: EMERGENCY MEDICINE
Payer: COMMERCIAL

## 2019-03-10 DIAGNOSIS — L03.031 CELLULITIS OF TOE OF RIGHT FOOT: ICD-10-CM

## 2019-03-10 DIAGNOSIS — L03.115 CELLULITIS OF RIGHT LOWER EXTREMITY: ICD-10-CM

## 2019-03-10 DIAGNOSIS — E11.10 TYPE 2 DIABETES MELLITUS WITH KETOACIDOSIS WITHOUT COMA, UNSPECIFIED WHETHER LONG TERM INSULIN USE (H): Primary | ICD-10-CM

## 2019-03-10 PROBLEM — N28.9 ACUTE KIDNEY INSUFFICIENCY: Status: RESOLVED | Noted: 2019-02-05 | Resolved: 2019-03-10

## 2019-03-10 PROBLEM — I63.9 CVA (CEREBRAL VASCULAR ACCIDENT) (H): Status: ACTIVE | Noted: 2017-04-21

## 2019-03-10 PROBLEM — E11.621 DIABETIC ULCER OF LEFT MIDFOOT ASSOCIATED WITH TYPE 2 DIABETES MELLITUS, WITH FAT LAYER EXPOSED (H): Status: ACTIVE | Noted: 2018-03-13

## 2019-03-10 PROBLEM — L97.422 DIABETIC ULCER OF LEFT MIDFOOT ASSOCIATED WITH TYPE 2 DIABETES MELLITUS, WITH FAT LAYER EXPOSED (H): Status: ACTIVE | Noted: 2018-03-13

## 2019-03-10 PROBLEM — L03.90 CELLULITIS: Status: ACTIVE | Noted: 2018-09-06

## 2019-03-10 PROBLEM — I48.20 CHRONIC ATRIAL FIBRILLATION (H): Status: ACTIVE | Noted: 2017-06-22

## 2019-03-10 PROBLEM — L03.116 CELLULITIS OF LEFT LOWER EXTREMITY: Status: ACTIVE | Noted: 2018-03-13

## 2019-03-10 PROBLEM — E11.51 TYPE 2 DIABETES MELLITUS WITH DIABETIC PERIPHERAL ANGIOPATHY WITHOUT GANGRENE (H): Status: ACTIVE | Noted: 2018-03-13

## 2019-03-10 PROBLEM — I50.21 ACUTE SYSTOLIC HEART FAILURE (H): Status: RESOLVED | Noted: 2019-02-05 | Resolved: 2019-03-10

## 2019-03-10 LAB
ANION GAP SERPL CALCULATED.3IONS-SCNC: 6 MMOL/L (ref 3–14)
BASOPHILS # BLD AUTO: 0 10E9/L (ref 0–0.2)
BASOPHILS NFR BLD AUTO: 0.2 %
BUN SERPL-MCNC: 19 MG/DL (ref 7–30)
CALCIUM SERPL-MCNC: 8.5 MG/DL (ref 8.5–10.1)
CHLORIDE SERPL-SCNC: 99 MMOL/L (ref 94–109)
CO2 SERPL-SCNC: 30 MMOL/L (ref 20–32)
CREAT SERPL-MCNC: 1.58 MG/DL (ref 0.66–1.25)
DIFFERENTIAL METHOD BLD: ABNORMAL
EOSINOPHIL # BLD AUTO: 0.1 10E9/L (ref 0–0.7)
EOSINOPHIL NFR BLD AUTO: 1.3 %
ERYTHROCYTE [DISTWIDTH] IN BLOOD BY AUTOMATED COUNT: 13.2 % (ref 10–15)
ERYTHROCYTE [SEDIMENTATION RATE] IN BLOOD BY WESTERGREN METHOD: 25 MM/H (ref 0–20)
GFR SERPL CREATININE-BSD FRML MDRD: 44 ML/MIN/{1.73_M2}
GLUCOSE BLDC GLUCOMTR-MCNC: 244 MG/DL (ref 70–99)
GLUCOSE SERPL-MCNC: 300 MG/DL (ref 70–99)
HCT VFR BLD AUTO: 33.6 % (ref 40–53)
HGB BLD-MCNC: 11.3 G/DL (ref 13.3–17.7)
IMM GRANULOCYTES # BLD: 0 10E9/L (ref 0–0.4)
IMM GRANULOCYTES NFR BLD: 0.2 %
LYMPHOCYTES # BLD AUTO: 1 10E9/L (ref 0.8–5.3)
LYMPHOCYTES NFR BLD AUTO: 11.5 %
MCH RBC QN AUTO: 28.9 PG (ref 26.5–33)
MCHC RBC AUTO-ENTMCNC: 33.6 G/DL (ref 31.5–36.5)
MCV RBC AUTO: 86 FL (ref 78–100)
MONOCYTES # BLD AUTO: 0.8 10E9/L (ref 0–1.3)
MONOCYTES NFR BLD AUTO: 9.3 %
NEUTROPHILS # BLD AUTO: 7 10E9/L (ref 1.6–8.3)
NEUTROPHILS NFR BLD AUTO: 77.5 %
NRBC # BLD AUTO: 0 10*3/UL
NRBC BLD AUTO-RTO: 0 /100
PLATELET # BLD AUTO: 211 10E9/L (ref 150–450)
POTASSIUM SERPL-SCNC: 3.7 MMOL/L (ref 3.4–5.3)
RBC # BLD AUTO: 3.91 10E12/L (ref 4.4–5.9)
SODIUM SERPL-SCNC: 135 MMOL/L (ref 133–144)
WBC # BLD AUTO: 9 10E9/L (ref 4–11)

## 2019-03-10 PROCEDURE — 99223 1ST HOSP IP/OBS HIGH 75: CPT | Mod: AI | Performed by: STUDENT IN AN ORGANIZED HEALTH CARE EDUCATION/TRAINING PROGRAM

## 2019-03-10 PROCEDURE — 00000146 ZZHCL STATISTIC GLUCOSE BY METER IP

## 2019-03-10 PROCEDURE — 85025 COMPLETE CBC W/AUTO DIFF WBC: CPT | Performed by: EMERGENCY MEDICINE

## 2019-03-10 PROCEDURE — 99285 EMERGENCY DEPT VISIT HI MDM: CPT | Mod: 25

## 2019-03-10 PROCEDURE — 80048 BASIC METABOLIC PNL TOTAL CA: CPT | Performed by: EMERGENCY MEDICINE

## 2019-03-10 PROCEDURE — 85652 RBC SED RATE AUTOMATED: CPT | Performed by: EMERGENCY MEDICINE

## 2019-03-10 PROCEDURE — 96365 THER/PROPH/DIAG IV INF INIT: CPT

## 2019-03-10 PROCEDURE — 73630 X-RAY EXAM OF FOOT: CPT | Mod: RT

## 2019-03-10 PROCEDURE — 12000000 ZZH R&B MED SURG/OB

## 2019-03-10 PROCEDURE — 25000131 ZZH RX MED GY IP 250 OP 636 PS 637: Performed by: STUDENT IN AN ORGANIZED HEALTH CARE EDUCATION/TRAINING PROGRAM

## 2019-03-10 PROCEDURE — 25000125 ZZHC RX 250: Performed by: EMERGENCY MEDICINE

## 2019-03-10 RX ORDER — PANTOPRAZOLE SODIUM 40 MG/1
40 TABLET, DELAYED RELEASE ORAL
Status: DISCONTINUED | OUTPATIENT
Start: 2019-03-11 | End: 2019-03-15 | Stop reason: HOSPADM

## 2019-03-10 RX ORDER — ACETAMINOPHEN 325 MG/1
650 TABLET ORAL EVERY 4 HOURS PRN
Status: DISCONTINUED | OUTPATIENT
Start: 2019-03-10 | End: 2019-03-12

## 2019-03-10 RX ORDER — DEXTROSE MONOHYDRATE 25 G/50ML
25-50 INJECTION, SOLUTION INTRAVENOUS
Status: DISCONTINUED | OUTPATIENT
Start: 2019-03-10 | End: 2019-03-12

## 2019-03-10 RX ORDER — ONDANSETRON 4 MG/1
4 TABLET, ORALLY DISINTEGRATING ORAL EVERY 6 HOURS PRN
Status: DISCONTINUED | OUTPATIENT
Start: 2019-03-10 | End: 2019-03-15 | Stop reason: HOSPADM

## 2019-03-10 RX ORDER — OXYCODONE HYDROCHLORIDE 5 MG/1
5-10 TABLET ORAL
Status: DISCONTINUED | OUTPATIENT
Start: 2019-03-10 | End: 2019-03-13

## 2019-03-10 RX ORDER — LEVOTHYROXINE SODIUM 137 UG/1
137 TABLET ORAL AT BEDTIME
Status: DISCONTINUED | OUTPATIENT
Start: 2019-03-10 | End: 2019-03-15 | Stop reason: HOSPADM

## 2019-03-10 RX ORDER — ONDANSETRON 2 MG/ML
4 INJECTION INTRAMUSCULAR; INTRAVENOUS EVERY 6 HOURS PRN
Status: DISCONTINUED | OUTPATIENT
Start: 2019-03-10 | End: 2019-03-15 | Stop reason: HOSPADM

## 2019-03-10 RX ORDER — ISOSORBIDE MONONITRATE 30 MG/1
30 TABLET, EXTENDED RELEASE ORAL DAILY
Status: DISCONTINUED | OUTPATIENT
Start: 2019-03-11 | End: 2019-03-15 | Stop reason: HOSPADM

## 2019-03-10 RX ORDER — FUROSEMIDE 40 MG
80 TABLET ORAL DAILY
Status: DISCONTINUED | OUTPATIENT
Start: 2019-03-11 | End: 2019-03-15 | Stop reason: HOSPADM

## 2019-03-10 RX ORDER — NALOXONE HYDROCHLORIDE 0.4 MG/ML
.1-.4 INJECTION, SOLUTION INTRAMUSCULAR; INTRAVENOUS; SUBCUTANEOUS
Status: DISCONTINUED | OUTPATIENT
Start: 2019-03-10 | End: 2019-03-12

## 2019-03-10 RX ORDER — CLINDAMYCIN PHOSPHATE 900 MG/50ML
900 INJECTION, SOLUTION INTRAVENOUS ONCE
Status: COMPLETED | OUTPATIENT
Start: 2019-03-10 | End: 2019-03-10

## 2019-03-10 RX ORDER — NICOTINE POLACRILEX 4 MG
15-30 LOZENGE BUCCAL
Status: DISCONTINUED | OUTPATIENT
Start: 2019-03-10 | End: 2019-03-12

## 2019-03-10 RX ORDER — ATORVASTATIN CALCIUM 40 MG/1
40 TABLET, FILM COATED ORAL EVERY EVENING
Status: DISCONTINUED | OUTPATIENT
Start: 2019-03-11 | End: 2019-03-15 | Stop reason: HOSPADM

## 2019-03-10 RX ORDER — CARVEDILOL 25 MG/1
25 TABLET ORAL 2 TIMES DAILY WITH MEALS
Status: DISCONTINUED | OUTPATIENT
Start: 2019-03-11 | End: 2019-03-15 | Stop reason: HOSPADM

## 2019-03-10 RX ORDER — CLINDAMYCIN PHOSPHATE 900 MG/50ML
900 INJECTION, SOLUTION INTRAVENOUS EVERY 8 HOURS
Status: DISCONTINUED | OUTPATIENT
Start: 2019-03-11 | End: 2019-03-11

## 2019-03-10 RX ADMIN — INSULIN GLARGINE 20 UNITS: 100 INJECTION, SOLUTION SUBCUTANEOUS at 23:48

## 2019-03-10 RX ADMIN — INSULIN ASPART 2 UNITS: 100 INJECTION, SOLUTION INTRAVENOUS; SUBCUTANEOUS at 22:50

## 2019-03-10 RX ADMIN — CLINDAMYCIN PHOSPHATE 900 MG: 900 INJECTION, SOLUTION INTRAVENOUS at 21:34

## 2019-03-10 SDOH — HEALTH STABILITY: MENTAL HEALTH: HOW OFTEN DO YOU HAVE A DRINK CONTAINING ALCOHOL?: NEVER

## 2019-03-10 ASSESSMENT — ACTIVITIES OF DAILY LIVING (ADL)
TRANSFERRING: 1-->ASSISTIVE EQUIPMENT
DRESS: 0-->INDEPENDENT
AMBULATION: 1-->ASSISTIVE EQUIPMENT
COGNITION: 0 - NO COGNITION ISSUES REPORTED
RETIRED_COMMUNICATION: 0-->UNDERSTANDS/COMMUNICATES WITHOUT DIFFICULTY
SWALLOWING: 0-->SWALLOWS FOODS/LIQUIDS WITHOUT DIFFICULTY
FALL_HISTORY_WITHIN_LAST_SIX_MONTHS: NO
RETIRED_EATING: 0-->INDEPENDENT
BATHING: 1-->ASSISTIVE EQUIPMENT
TOILETING: 0-->INDEPENDENT

## 2019-03-10 ASSESSMENT — MIFFLIN-ST. JEOR: SCORE: 1964.67

## 2019-03-10 NOTE — LETTER
Transition Communication Hand-off for Care Transitions to Next Level of Care Provider    Name: Jayro Elder  : 1950  MRN #: [id number]  Primary Care Provider: Davion Cordova     Primary Clinic: [address] [facility] [address]     Reason for Hospitalization:  Cellulitis of right lower extremity [L03.115]  Admit Date/Time: 3/10/2019  7:50 PM  Discharge Date: 3/15/19  Payor Source: Payor: HU[employer]NA / Plan: HU[employer]NA MEDICARE ADVANTAGE / Product Type: Medicare /     Readmission Assessment Measure (ANDRIA) Risk Score/category: High           Reason for Communication Hand-off Referral: Fragility  Multiple providers/specialties    Discharge Plan:  Home     Concern for non-adherence with plan of care:   Yes, limited transportation  Discharge Needs Assessment:  Needs      Most Recent Value   Equipment Currently Used at Home  cane, straight, shower chair, walker, rolling, wheelchair, manual, glucometer          Already enrolled in Tele-monitoring program and name of program:  NA  Follow-up specialty is recommended: Yes    Follow-up plan:    Future Appointments   Date Time Provider Department Center   2019  7:30 AM [facility]   2019  8:10 AM Gill Doty PA [facility]       Any outstanding tests or procedures:        Referrals     Future Labs/Procedures    Medication Therapy Management Referral     Comments:        This service is designed to help you get the most from your medications.  A specially trained pharmacist will work closely with you and your doctors  to solve any problems related to your medications and to help you get the   best results from taking them.      The Medication Therapy Management staff will call you to schedule an appointment.            Key Recommendations:  Pt is admitted with a right foot ulcer.  Pt said they were caused [place] and then didn't know he developed blisters.  ANDRIA ELEVATED.  Pt used to follow with CONCEPCION Ricardo  [facility] with [facility].  However, he doesn't drive d/t his vision and he has neuropathy.  His wife provides transportation for him, but she works Monday-Friday 8:30-5pm.  She is suppose to retire at the end of May.  Pt's A1c was 11.7 as of 1/5/19.  Pt said before that time he had a different insurance and he hadn't been filling or taking his medications.  Pt now has a One Touch and takes his novolog and Lantus.  However, he said his blood glucose still has large ranges from 150-400.  He also said his strips are expensive.  He said [facility] pharmacy is looking into it.  I consulted our pharmacy liaison and she said pt does have coverage for Accu Check glucometer and testing strips for three times a day for $17 count 100.  Pt requests that I order this and he did receive it.  He is scheduled with Dr [facility] in [place] in June.  He doesn't want to move this appt up since he doesn't have transportation earlier.  He follows with cardiology for his CHF.  He weighs himself daily and monitors his salt intake.  Pt declines to reestablish care with primary care d/t his transportation issue. However, on day of discharge he decided that I can give a hand off to [facility] in case he is in need of resources.   He doesn't want the primary care clinic calling all the time checking up on him when he can't get to the clinic. Recommendations are for patient to follow up with Podiatry and was discharged home on oral Augmentin.         Rosy Morrow & Miracle Salvador    AVS/Discharge Summary is the source of truth; this is a helpful guide for improved communication of patient story

## 2019-03-11 ENCOUNTER — APPOINTMENT (OUTPATIENT)
Dept: MRI IMAGING | Facility: CLINIC | Age: 69
DRG: 617 | End: 2019-03-11
Attending: PODIATRIST
Payer: COMMERCIAL

## 2019-03-11 LAB
ANION GAP SERPL CALCULATED.3IONS-SCNC: 4 MMOL/L (ref 3–14)
BUN SERPL-MCNC: 19 MG/DL (ref 7–30)
CALCIUM SERPL-MCNC: 8.3 MG/DL (ref 8.5–10.1)
CHLORIDE SERPL-SCNC: 104 MMOL/L (ref 94–109)
CO2 SERPL-SCNC: 30 MMOL/L (ref 20–32)
CREAT SERPL-MCNC: 1.33 MG/DL (ref 0.66–1.25)
ERYTHROCYTE [DISTWIDTH] IN BLOOD BY AUTOMATED COUNT: 13 % (ref 10–15)
GFR SERPL CREATININE-BSD FRML MDRD: 54 ML/MIN/{1.73_M2}
GLUCOSE BLDC GLUCOMTR-MCNC: 123 MG/DL (ref 70–99)
GLUCOSE BLDC GLUCOMTR-MCNC: 132 MG/DL (ref 70–99)
GLUCOSE BLDC GLUCOMTR-MCNC: 189 MG/DL (ref 70–99)
GLUCOSE BLDC GLUCOMTR-MCNC: 197 MG/DL (ref 70–99)
GLUCOSE BLDC GLUCOMTR-MCNC: 238 MG/DL (ref 70–99)
GLUCOSE BLDC GLUCOMTR-MCNC: 298 MG/DL (ref 70–99)
GLUCOSE SERPL-MCNC: 121 MG/DL (ref 70–99)
HCT VFR BLD AUTO: 31.2 % (ref 40–53)
HGB BLD-MCNC: 10.4 G/DL (ref 13.3–17.7)
MCH RBC QN AUTO: 28.7 PG (ref 26.5–33)
MCHC RBC AUTO-ENTMCNC: 33.3 G/DL (ref 31.5–36.5)
MCV RBC AUTO: 86 FL (ref 78–100)
PLATELET # BLD AUTO: 190 10E9/L (ref 150–450)
POTASSIUM SERPL-SCNC: 3.2 MMOL/L (ref 3.4–5.3)
POTASSIUM SERPL-SCNC: 4.1 MMOL/L (ref 3.4–5.3)
RBC # BLD AUTO: 3.62 10E12/L (ref 4.4–5.9)
SODIUM SERPL-SCNC: 138 MMOL/L (ref 133–144)
WBC # BLD AUTO: 7.3 10E9/L (ref 4–11)

## 2019-03-11 PROCEDURE — G0463 HOSPITAL OUTPT CLINIC VISIT: HCPCS

## 2019-03-11 PROCEDURE — 00000146 ZZHCL STATISTIC GLUCOSE BY METER IP

## 2019-03-11 PROCEDURE — 25500064 ZZH RX 255 OP 636: Performed by: STUDENT IN AN ORGANIZED HEALTH CARE EDUCATION/TRAINING PROGRAM

## 2019-03-11 PROCEDURE — 25000131 ZZH RX MED GY IP 250 OP 636 PS 637: Performed by: STUDENT IN AN ORGANIZED HEALTH CARE EDUCATION/TRAINING PROGRAM

## 2019-03-11 PROCEDURE — A9585 GADOBUTROL INJECTION: HCPCS | Performed by: STUDENT IN AN ORGANIZED HEALTH CARE EDUCATION/TRAINING PROGRAM

## 2019-03-11 PROCEDURE — 25000132 ZZH RX MED GY IP 250 OP 250 PS 637: Performed by: INTERNAL MEDICINE

## 2019-03-11 PROCEDURE — 36415 COLL VENOUS BLD VENIPUNCTURE: CPT | Performed by: INTERNAL MEDICINE

## 2019-03-11 PROCEDURE — 84132 ASSAY OF SERUM POTASSIUM: CPT | Performed by: INTERNAL MEDICINE

## 2019-03-11 PROCEDURE — 25000125 ZZHC RX 250: Performed by: STUDENT IN AN ORGANIZED HEALTH CARE EDUCATION/TRAINING PROGRAM

## 2019-03-11 PROCEDURE — 36415 COLL VENOUS BLD VENIPUNCTURE: CPT | Performed by: STUDENT IN AN ORGANIZED HEALTH CARE EDUCATION/TRAINING PROGRAM

## 2019-03-11 PROCEDURE — 80048 BASIC METABOLIC PNL TOTAL CA: CPT | Performed by: STUDENT IN AN ORGANIZED HEALTH CARE EDUCATION/TRAINING PROGRAM

## 2019-03-11 PROCEDURE — 25000128 H RX IP 250 OP 636: Performed by: INTERNAL MEDICINE

## 2019-03-11 PROCEDURE — 73720 MRI LWR EXTREMITY W/O&W/DYE: CPT | Mod: RT

## 2019-03-11 PROCEDURE — 99232 SBSQ HOSP IP/OBS MODERATE 35: CPT | Performed by: INTERNAL MEDICINE

## 2019-03-11 PROCEDURE — 25000132 ZZH RX MED GY IP 250 OP 250 PS 637: Performed by: STUDENT IN AN ORGANIZED HEALTH CARE EDUCATION/TRAINING PROGRAM

## 2019-03-11 PROCEDURE — 85027 COMPLETE CBC AUTOMATED: CPT | Performed by: STUDENT IN AN ORGANIZED HEALTH CARE EDUCATION/TRAINING PROGRAM

## 2019-03-11 PROCEDURE — 99222 1ST HOSP IP/OBS MODERATE 55: CPT | Mod: 24 | Performed by: PODIATRIST

## 2019-03-11 PROCEDURE — 12000000 ZZH R&B MED SURG/OB

## 2019-03-11 RX ORDER — POTASSIUM CHLORIDE 7.45 MG/ML
10 INJECTION INTRAVENOUS
Status: DISCONTINUED | OUTPATIENT
Start: 2019-03-11 | End: 2019-03-15 | Stop reason: HOSPADM

## 2019-03-11 RX ORDER — POTASSIUM CHLORIDE 1.5 G/1.58G
20-40 POWDER, FOR SOLUTION ORAL
Status: DISCONTINUED | OUTPATIENT
Start: 2019-03-11 | End: 2019-03-15 | Stop reason: HOSPADM

## 2019-03-11 RX ORDER — POTASSIUM CHLORIDE 29.8 MG/ML
20 INJECTION INTRAVENOUS
Status: DISCONTINUED | OUTPATIENT
Start: 2019-03-11 | End: 2019-03-15 | Stop reason: HOSPADM

## 2019-03-11 RX ORDER — POTASSIUM CL/LIDO/0.9 % NACL 10MEQ/0.1L
10 INTRAVENOUS SOLUTION, PIGGYBACK (ML) INTRAVENOUS
Status: DISCONTINUED | OUTPATIENT
Start: 2019-03-11 | End: 2019-03-15 | Stop reason: HOSPADM

## 2019-03-11 RX ORDER — GADOBUTROL 604.72 MG/ML
15 INJECTION INTRAVENOUS ONCE
Status: COMPLETED | OUTPATIENT
Start: 2019-03-11 | End: 2019-03-11

## 2019-03-11 RX ORDER — AMPICILLIN AND SULBACTAM 2; 1 G/1; G/1
3 INJECTION, POWDER, FOR SOLUTION INTRAMUSCULAR; INTRAVENOUS EVERY 6 HOURS
Status: DISCONTINUED | OUTPATIENT
Start: 2019-03-11 | End: 2019-03-14

## 2019-03-11 RX ORDER — MAGNESIUM SULFATE HEPTAHYDRATE 40 MG/ML
4 INJECTION, SOLUTION INTRAVENOUS EVERY 4 HOURS PRN
Status: DISCONTINUED | OUTPATIENT
Start: 2019-03-11 | End: 2019-03-15 | Stop reason: HOSPADM

## 2019-03-11 RX ORDER — POTASSIUM CHLORIDE 1500 MG/1
20-40 TABLET, EXTENDED RELEASE ORAL
Status: DISCONTINUED | OUTPATIENT
Start: 2019-03-11 | End: 2019-03-15 | Stop reason: HOSPADM

## 2019-03-11 RX ADMIN — AMPICILLIN SODIUM AND SULBACTAM SODIUM 3 G: 2; 1 INJECTION, POWDER, FOR SOLUTION INTRAMUSCULAR; INTRAVENOUS at 22:16

## 2019-03-11 RX ADMIN — GADOBUTROL 15 ML: 604.72 INJECTION INTRAVENOUS at 09:34

## 2019-03-11 RX ADMIN — OXYCODONE HYDROCHLORIDE 5 MG: 5 TABLET ORAL at 22:17

## 2019-03-11 RX ADMIN — PANTOPRAZOLE SODIUM 40 MG: 40 TABLET, DELAYED RELEASE ORAL at 08:33

## 2019-03-11 RX ADMIN — INSULIN ASPART 1 UNITS: 100 INJECTION, SOLUTION INTRAVENOUS; SUBCUTANEOUS at 18:16

## 2019-03-11 RX ADMIN — INSULIN ASPART 2 UNITS: 100 INJECTION, SOLUTION INTRAVENOUS; SUBCUTANEOUS at 22:27

## 2019-03-11 RX ADMIN — CARVEDILOL 25 MG: 25 TABLET, FILM COATED ORAL at 17:02

## 2019-03-11 RX ADMIN — CARVEDILOL 25 MG: 25 TABLET, FILM COATED ORAL at 08:34

## 2019-03-11 RX ADMIN — ATORVASTATIN CALCIUM 40 MG: 40 TABLET, FILM COATED ORAL at 19:46

## 2019-03-11 RX ADMIN — CLINDAMYCIN PHOSPHATE 900 MG: 900 INJECTION, SOLUTION INTRAVENOUS at 06:25

## 2019-03-11 RX ADMIN — ACETAMINOPHEN 650 MG: 325 TABLET, FILM COATED ORAL at 17:06

## 2019-03-11 RX ADMIN — POTASSIUM CHLORIDE 40 MEQ: 1500 TABLET, EXTENDED RELEASE ORAL at 10:33

## 2019-03-11 RX ADMIN — INSULIN GLARGINE 20 UNITS: 100 INJECTION, SOLUTION SUBCUTANEOUS at 22:32

## 2019-03-11 RX ADMIN — AMPICILLIN SODIUM AND SULBACTAM SODIUM 3 G: 2; 1 INJECTION, POWDER, FOR SOLUTION INTRAMUSCULAR; INTRAVENOUS at 17:01

## 2019-03-11 RX ADMIN — INSULIN ASPART 1 UNITS: 100 INJECTION, SOLUTION INTRAVENOUS; SUBCUTANEOUS at 02:14

## 2019-03-11 RX ADMIN — POTASSIUM CHLORIDE 20 MEQ: 1500 TABLET, EXTENDED RELEASE ORAL at 12:37

## 2019-03-11 RX ADMIN — FUROSEMIDE 80 MG: 40 TABLET ORAL at 08:33

## 2019-03-11 RX ADMIN — LEVOTHYROXINE SODIUM 137 MCG: 137 TABLET ORAL at 22:16

## 2019-03-11 RX ADMIN — ISOSORBIDE MONONITRATE 30 MG: 30 TABLET, EXTENDED RELEASE ORAL at 08:33

## 2019-03-11 RX ADMIN — CLINDAMYCIN PHOSPHATE 900 MG: 900 INJECTION, SOLUTION INTRAVENOUS at 12:37

## 2019-03-11 RX ADMIN — INSULIN ASPART 1 UNITS: 100 INJECTION, SOLUTION INTRAVENOUS; SUBCUTANEOUS at 12:52

## 2019-03-11 ASSESSMENT — ACTIVITIES OF DAILY LIVING (ADL)
ADLS_ACUITY_SCORE: 16
ADLS_ACUITY_SCORE: 15
ADLS_ACUITY_SCORE: 16
ADLS_ACUITY_SCORE: 15

## 2019-03-11 NOTE — PROGRESS NOTES
"      [facility]  Hospitalist Progress Note  Oscar Lyles MD 03/11/2019    Reason for Stay (Diagnosis): Foot cellulitis and diabetic foot ulcers and concern for osteomyelitis         Assessment and Plan:      Summary of Stay: Jayro Elder is a 68 year old male admitted on 3/10/2019 with foot pain swelling and redness.  Patient has history of type 2 diabetes, coronary artery disease with multiple stents, ischemic cardiomyopathy and chronic kidney disease.  Patient was admitted and was started on IV antibiotic, infectious disease physician was consulted as well as podiatry service for further care plan recommendations      Problem List:   1. Acute cellulitis involving right lower extremity, fourth toe necrotic ulcer  --Afebrile, hemodynamically stable, continue antibiotic, await recommendation from infectious disease and podiatry service.  May need MRI study.    2. CKD: Stable  3. Ischemic cardiomyopathy: Stable, continue current medications  4. Hypertension: Controlled, continue PTA Current Medications reviewed  5. Type II  diabetes: Insulin requiring.  For blood sugar control, continue reduced Lantus dose at 20 units a day, continue sliding scale insulin  6. Hypothyroidism: Continue placement      Indwelling devices: Peripheral IV  FEN : Diabetic diet    DVT Prophylaxis: Pneumatic Compression Devices  Code Status: Full Code  Discharge Dispo: PTA setting  Estimated Disch Date / # of Days until Disch: Per podiatry and infectious to service        Interval History (Subjective):      Patient was seen and examined by me this morning.  He is doing well without complaint of fever, chest pain or shortness of breath.  No significant lower extremity pain.  No drainage.         Physical Exam:      Last Vital Signs:  /70 (BP Location: Left arm)   Pulse 92   Temp 98.7  F (37.1  C) (Oral)   Resp 16   Ht 1.93 m (6' 4\")   Wt 109.3 kg (241 lb)   SpO2 97%   BMI 29.34 kg/m          Constitutional: " Awake, alert, cooperative, no apparent distress   Respiratory: Clear to auscultation bilaterally, no crackles or wheezing   Cardiovascular: Regular rate and rhythm, normal S1 and S2, and no murmur noted   Abdomen: Normal bowel sounds, soft, non-distended, non-tender   Skin/musculoskeletal There is swelling/edema/and erythema of his right lower extremity. It is diffusely tender to palpation. There is right 4th toe tip that appears necrotic and with foul odor. There is a chronic appearing left plantar ulcer that is without purulence or erythema     Neuro: Alert and oriented x3, no weakness, numbness, memory loss   Extremities: No edema, normal range of motion   Other(s):        All other systems: Negative          Medications:      All current medications were reviewed with changes reflected in problem list.         Data:      All new lab and imaging data was reviewed.      Data reviewed today: I reviewed all new labs and imaging results over the last 24 hours. I personally reviewed no images or EKG's today      Recent Labs   Lab 03/11/19  0653 03/10/19  2054   WBC 7.3 9.0   HGB 10.4* 11.3*   HCT 31.2* 33.6*   MCV 86 86    211     No results for input(s): CULT in the last 168 hours.  Recent Labs   Lab 03/11/19  0653 03/10/19  2054 03/07/19  0743    135 135   POTASSIUM 3.2* 3.7 3.7   CHLORIDE 104 99 101   CO2 30 30 30   ANIONGAP 4 6 4   * 300* 202*   BUN 19 19 19   CR 1.33* 1.58* 1.38*   GFRESTIMATED 54* 44* 52*   GFRESTBLACK 63 51* 60*   BETTIE 8.3* 8.5 8.4*       Recent Labs   Lab 03/11/19  1000 03/11/19  0653 03/11/19  0613 03/11/19 0214 03/10/19  2236 03/10/19  2054 03/07/19  0743   GLC  --  121*  --   --   --  300* 202*   *  --  123* 197* 244*  --   --            No results for input(s): INR in the last 168 hours.      No results for input(s): TROPONIN, TROPI, TROPR in the last 168 hours.    Invalid input(s): TROP, TROPONINIES    Recent Results (from the past 48 hour(s))   Foot  XR, G/E 3  views, right    Narrative    FOOT RIGHT THREE OR MORE VIEWS 3/10/2019 9:09 PM     COMPARISON: 1/8/2019    HISTORY: DM foot ulcer 4th toe.    FINDINGS: The second digit has been amputated at the level of the mid  proximal phalanx. The third digit also appears to have been amputated  at the level of the DIP joint. There is moderate hallux valgus. There  is soft tissue swelling about the forefoot. No bone erosion is  identified. No foreign body. There are degenerative changes in the  midfoot. No fracture.      Impression    IMPRESSION:  1. No evidence of osteomyelitis by x-ray.  2. Moderate soft tissue swelling about the forefoot.  3. Second and third digit amputations.  4. Moderate hallux valgus.    MICHELL MCGUIRE MD   MR Foot Right w/o & w Contrast    Narrative    MR FOOT RIGHT WITHOUT AND WITH CONTRAST 3/11/2019 9:37 AM    HISTORY: Fourth right toe blister in diabetic patient. Evaluate for  osteomyelitis.    TECHNIQUE: Multisequence multiplanar MRI without and with IV contrast.  11 mL Gadavist given intravenously.    COMPARISON: 1/6/2019.    FINDINGS: There is abnormal bone marrow edema without significant  contrast enhancement involving the distal phalanx of the fourth toe,  corresponding to the area of current clinical concern (image 13 of  series 4). This is a new finding since the prior exam and is not  entirely specific since there is no definite cortical effacement. This  could be reactive bone marrow edema but early osteomyelitis is  suspected.    The prior exam showed amputation of the distal second toe at the  proximal interphalangeal joint. The current study shows interval  further amputation of the second toe to the level of the mid proximal  phalanx. There is some minimal bone marrow edema associated with  remaining second proximal phalanx, likely postoperative change. Soft  tissues in this region show no acute abnormality. Again seen is prior  amputation of the distal third toe at the distal interphalangeal  joint  with no acute-appearing abnormality in this region.    No other potentially acute-appearing bony abnormalities. Hallux valgus  metatarsus varus deformity and advanced osteoarthritis at the first  metatarsophalangeal joint again noted. Degenerative changes at  tarsometatarsal joints are also seen, especially the second  tarsometatarsal joint.    Soft tissue swelling and edema about the fourth toe. No ulcer is seen  and there is no evidence for soft tissue abscess. There is also  moderate nonspecific subcutaneous edema over the dorsum of most of the  forefoot.      Impression    IMPRESSION:  1. Bone marrow edema without definite cortical effacement involving  the distal phalanx of the fourth toe associated with surrounding soft  tissue edema and swelling. The findings are not entirely specific, but  early osteomyelitis in the distal phalanx is suspected.  2. Interval revision of second toe amputation which is now at the mid  proximal phalanx level.  3. No other changes since the prior exam.       Disclaimer: This note consists of symbols derived from keyboarding, dictation and/or voice recognition software. As a result, there may be errors in the script that have gone undetected. Please consider this when interpreting information found in this chart

## 2019-03-11 NOTE — H&P
[facility]    History and Physical - Hospitalist Service       Date of Admission:  3/10/2019    Assessment & Plan     Jayro Elder is a 68 year old male admitted on 3/10/2019. He presents with right foot pain/swelling and blister.    Cellulitis   Diabetic Foot Ulcers  Hx of Osteomyelitis:   Assessment: Presents with right lower extremity swelling/edema/erythema with fourth toe that appears necrotic.  Initial foot x-ray showed no evidence of osteomyelitis, but I do have concern that he has deep infection in addition to associated cellulitis. Patient overall is afebrile and hemodynamically stable  Plan:  - Admit to inpatient  - Podiatry and infectious disease consulted  - Continue with IV Clindamycin while in-house  - Follow vitals/temp  - Pain control PRN  - NPO at midnight until podiatry evaluates  - WOC consult    CKD  Assessment/Plan: Cr on admission 1.58, baseline 1.38-2.04. Avoid NSAIDs/nephrotoxins. Follow Cr/electrolytes.    Type 2 DM   Assessment: PTA on Glargine 26 U daily and ASpart 8U TID with meals.   Plan:  - sliding scale insulin as needed  - Reduce Glargine to 20 U daily  - Hypoglycemia protocol    Coronary artery disease  Ischemic cardiomyopathy   Assessment: most recent echocardiogram in this September 2018 showing an ejection fraction of 30-35%.  Baseline meds Coreg twice daily, lisinopril 20 mg p.o. twice daily, and isosorbide 30 mg p.o.  Plan:  -  Continue home ACEi if Cr remains stable in AM, continue isosorbide, beta-blocker, and statin    Chronic atrial fibrillation:   Assessment/Plan: Anticoagulated with apixaban can resume if Cr stable.    Hypertension  Assessment:  Prior to admission medications are amlodipine 5 mg p.o. daily, lisinopril 20 mg p.o. daily, isosorbide mononitrate 30 mg p.o. daily and Coreg 25 mg BID.    Plan:  - Resume PTA Norvasc and Lisinopril if Cr stable in AM  - Continue PTA IMDUR and Coreg     Hypothyroidism  Assessment/Plan: resumed home  levothyroxine       Diet: Cardiac diet/NPO at midnight  DVT Prophylaxis: Apixaban  Taylor Catheter: not present  Code Status: FULL CODE    Disposition Plan   Expected discharge: 2 - 3 days, recommended to prior living arrangement once SIRS/Sepsis treated.  Entered: Werner Purcell MD 03/10/2019, 9:10 PM     The patient's care was discussed with the Patient, Patient's Family and ED provider.    Werner Purcell MD  [facility]    ______________________________________________________________________    Chief Complaint     Foot Pain/swelling/Redness    History is obtained from the patient with PMH of type 2 diabetes complicated by prior diabetic foot infections, coronary artery disease with stents, and associated ischemic cardiomyopathy with most recent echocardiogram completed 9/2018 showing an ejection fraction of 35-40% with indeterminate diastolic dysfunction, stress testing completed on 9/14/2018 showing a fixed large mid to distal anterior and anteroseptal defect, atrial fibrillation on chronic apixaban, untreated obstructive sleep apnea who is being admitted on 03/10/2019 for evaluation of foot pain.    Patient reports that 2 weeks prior to admission he developed a blister on his right foot, this blister has since become worse and that is much more painful and now has developed a foul odor over the past week.  And over the last 4 days it has become increasingly red and his right foot overall has become much more tender and swollen.  He has had significant difficulty ambulating due to his inability to bear weight on his right foot.  He had an appointment to see his podiatrist tomorrow but his pain was so severe that he felt he should have evaluation in the emergency department.  He also notes that his left foot ulcer has been bleeding more over the past few weeks as well, but his left foot is not swollen or nearly as painful as his right. He denies any fevers.  Denies any recent trauma since his blister  developed.  He denies any chest pain/shortness of breath.  He denies any nausea/vomiting, abdominal pain.  He denies any PND/orthopnea.    History of Present Illness   Jayro Elder is a 68 year old male with     Review of Systems    The 10 point Review of Systems is negative other than noted in the HPI or here.     Past Medical History    I have reviewed this patient's medical history and updated it with pertinent information if needed.   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     Cardiomyopathy (H)      Cellulitis of left lower extremity 3/13/2018     Cerebral infarction (H)     eye sight decreased in peripheral of right side and blurry     Diabetic ulcer of left midfoot associated with type 2 diabetes mellitus, with fat layer exposed (H) 3/13/2018     Essential hypertension, benign 11/13/2002     Generalized osteoarthrosis, unspecified site 11/13/2002     Microalbuminuria 3/13/2018    X1     Myocardial infarction (H)      Neuropathy      Sepsis (H)      Sleep apnea     doesn't use it     Substance abuse (H)      Syncope, unspecified syncope type 3/13/2018     Thyroid disease     takes medicine     Tobacco use disorder 11/13/2002     Type 2 diabetes mellitus with diabetic peripheral angiopathy without gangrene, unspecified long term insulin use status 2005       Past Surgical History   I have reviewed this patient's surgical history and updated it with pertinent information if needed.  Past Surgical History:   Procedure Laterality Date     AMPUTATE TOE(S) Right 1/6/2019    Procedure: Right open second toe partial amputation;  Surgeon: Sabino Garcia DPM;  Location: [facility] OR     AMPUTATE TOE(S) Right 1/8/2019    Procedure: 1.  Revision right second toe amputation with resection of distal half of proximal phalanx with full closure.    2.  Debridement of  callus/preulcerative lesion, distal left second toe and plantar left first metatarsophalangeal joint.;  Surgeon: Ryan Bhagat DPM;  Location: [facility] OR     ANGIOGRAM  05    subtotal occ.mid LAD,SUSAN mid LAD     ANGIOGRAM      mild CAD,patent stent,no flow-limiting lesions,sev.LV dysf.LAD enlarged     ANGIOGRAM      Sev.single vessel disease,Mod LV dysf.distal LAD 90%,70-75% mid lad just before prev stent,SUSAN to prox.mid LAD, endeavor to distal LAD     ANGIOGRAM  13    restenosis, stent LAD     BACK SURGERY      back surgery x3     C NONSPECIFIC PROCEDURE      Laminectomy x 3 - (1983 x 2 & )     C NONSPECIFIC PROCEDURE Bilateral     Bunionectomy     C NONSPECIFIC PROCEDURE      Gingival surgery at age 9     HEART CATH LEFT HEART CATH  2017    SUSAN to OM3     INCISION AND DRAINAGE TOE, COMBINED Bilateral 2018    Procedure: COMBINED INCISION AND DRAINAGE TOE;  1) Irrigation and debridement ulcer left great toe  2) Amputation 3rd toe right foot at distal interphalangeal joint level;  Surgeon: Miki Harp MD;  Location: [facility] OR     ORTHOPEDIC SURGERY      bunion surgery both feet     ORTHOPEDIC SURGERY      left shoulder     SOFT TISSUE SURGERY      debridement of toe numerous time     VASCULAR SURGERY      7 stents in heart       Social History   I have reviewed this patient's social history and updated it with pertinent information if needed.  Social History     Tobacco Use     Smoking status: Former Smoker     Packs/day: 1.00     Years: 25.00     Pack years: 25.00     Types: Cigarettes     Last attempt to quit: 2005     Years since quittin.6     Smokeless tobacco: Never Used   Substance Use Topics     Alcohol use: No     Alcohol/week: 2.4 oz     Types: 4 Shots of liquor per week     Frequency: Never     Comment: almost every day     Drug use: No       Family History   I have reviewed this patient's family history and updated it with pertinent information  if needed.   Family History   Problem Relation Age of Onset     Cancer - colorectal Sister      Thyroid Disease Brother      Cardiovascular Brother      Diabetes Brother      Cancer Father         tumor in chest and throat     Arthritis Mother      Thyroid Disease Mother      Diabetes Mother      Glaucoma No family hx of      Macular Degeneration No family hx of      Prior to Admission Medications   Prior to Admission Medications   Prescriptions Last Dose Informant Patient Reported? Taking?   Cholecalciferol (VITAMIN D3) 2000 units TABS   Yes No   Sig: Take 1 tablet by mouth daily   Insulin Aspart (NOVOLOG SC)   Yes Yes   Insulin Glargine (LANTUS SC)   Yes Yes   apixaban ANTICOAGULANT (ELIQUIS) 5 MG tablet   No Yes   Sig: Take 1 tablet (5 mg) by mouth 2 times daily   atorvastatin (LIPITOR) 40 MG tablet   No Yes   Sig: Take 1 tablet (40 mg) by mouth every evening   carvedilol (COREG) 25 MG tablet   No Yes   Sig: Take 1 tablet (25 mg) by mouth 2 times daily (with meals)   furosemide (LASIX) 80 MG tablet   No Yes   Sig: Take 1 tablet (80 mg) by mouth daily   insulin pen needle 31G X 6 MM  Self No Yes   Sig: Use  as directed.   isosorbide mononitrate (IMDUR) 30 MG 24 hr tablet   No Yes   Sig: Take 1 tablet (30 mg) by mouth daily   levothyroxine (SYNTHROID, LEVOTHROID) 137 MCG tablet  Self Yes Yes   Sig: Take 137 mcg by mouth At Bedtime    lisinopril (PRINIVIL/ZESTRIL) 5 MG tablet   No Yes   Sig: Take 1 tablet (5 mg) by mouth daily   nitroglycerin (NITROSTAT) 0.4 MG sublingual tablet  Self No Yes   Sig: Place 1 tablet (0.4 mg) under the tongue every 5 minutes as needed for chest pain   order for DME  Self No No   Sig: Equipment being ordered: Walker Wheels () and Walker ()  Treatment Diagnosis: Impaired gait, heel WB bilaterally.   Patient not taking: Reported on 3/7/2019   order for DME   No No   Sig: Equipment being ordered: Other: surgical shoe male XL  Treatment Diagnosis: sp right 2nd toe amputaion    Patient not taking: Reported on 2/5/2019   pantoprazole (PROTONIX) 40 MG enteric coated tablet  Self No Yes   Sig: Take 1 tablet (40 mg) by mouth every morning (before breakfast)   potassium chloride ER (K-DUR/KLOR-CON M) 10 MEQ CR tablet   No Yes   Sig: Take 1 tablet (10 mEq) by mouth 2 times daily      Facility-Administered Medications: None     Allergies   Allergies   Allergen Reactions     No Known Allergies        Physical Exam   Vital Signs: Temp: 97.9  F (36.6  C) Temp src: Oral BP: 127/86   Heart Rate: 83 Resp: 18 SpO2: 97 % O2 Device: None (Room air)    Weight: 0 lbs 0 oz    Constitutional: awake, alert, cooperative and in  no apparent distress  Eyes: Lids and lashes normal, pupils equal, round and reactive to light, extra ocular muscles intact, sclera clear, conjunctiva normal  ENT: Normocephalic, without obvious abnormality, atraumatic, sinuses nontender on palpation, external ears without lesions.  Hematologic / Lymphatic: no cervical lymphadenopathy  Respiratory: CTABL  Cardiovascular:  regular rate and irregular rhythm, normal S1 and S2, no S3 or S4, and no murmur noted  GI: No scars, normal bowel sounds, soft, non-distended, non-tender, no masses palpated, no hepatosplenomegally  Musculoskeletal/SKIN: There is swelling/edema/and erythema of his right lower extremity. It is diffusely tender to palpation. There is right 4th toe tip that appears necrotic and with foul odor. There is a chronic appearing left plantar ulcer that is without purulence or erythema.   Neurologic: Awake, alert, oriented to name, place and time.  Cranial nerves II-XII are grossly intact.  Motor is 5 out of 5 bilaterally. Sensory is intact.    Neuropsychiatric: normal mood and affect  Data   Data reviewed today: I reviewed all medications, new labs and imaging results over the last 24 hours. I personally reviewed the Foot XR image(s) showing see below.    IMPRESSION:  1. No evidence of osteomyelitis by x-ray.  2. Moderate soft  tissue swelling about the forefoot.  3. Second and third digit amputations.  4. Moderate hallux valgus.    Most Recent 3 CBC's:  Recent Labs   Lab Test 03/10/19  2054 01/24/19  0526 01/23/19  0536 01/22/19  0522   WBC 9.0 6.8  --  6.5   HGB 11.3* 10.7*  --  10.5*   MCV 86 87  --  87    295 292 260     Most Recent 3 BMP's:  Recent Labs   Lab Test 03/10/19  2054 03/07/19  0743 02/14/19  0713    135 137   POTASSIUM 3.7 3.7 3.7   CHLORIDE 99 101 101   CO2 30 30 28   BUN 19 19 32*   CR 1.58* 1.38* 1.83*   ANIONGAP 6 4 8   BETTIE 8.5 8.4* 8.6   * 202* 227*     Most Recent 2 LFT's:  Recent Labs   Lab Test 01/17/19  1652 01/13/19  0702   AST 10 10   ALT 17 15   ALKPHOS 77 66   BILITOTAL 1.2 0.5     Most Recent 3 INR's:  Recent Labs   Lab Test 01/20/19  0600 01/19/19  0544 01/18/19  0611   INR 1.43* 1.40* 1.28*     Recent Results (from the past 24 hour(s))   Foot  XR, G/E 3 views, right    Narrative    FOOT RIGHT THREE OR MORE VIEWS 3/10/2019 9:09 PM     COMPARISON: 1/8/2019    HISTORY: DM foot ulcer 4th toe.    FINDINGS: The second digit has been amputated at the level of the mid  proximal phalanx. The third digit also appears to have been amputated  at the level of the DIP joint. There is moderate hallux valgus. There  is soft tissue swelling about the forefoot. No bone erosion is  identified. No foreign body. There are degenerative changes in the  midfoot. No fracture.      Impression    IMPRESSION:  1. No evidence of osteomyelitis by x-ray.  2. Moderate soft tissue swelling about the forefoot.  3. Second and third digit amputations.  4. Moderate hallux valgus.

## 2019-03-11 NOTE — ED NOTES
"[facility]  ED Nurse Handoff Report    Jayro Elder is a 68 year old male   ED Chief complaint: other  . ED Diagnosis:   Final diagnoses:   Cellulitis of right lower extremity     Allergies:   Allergies   Allergen Reactions     No Known Allergies        Code Status: Full Code  Activity level - Baseline/Home:  Independent. Activity Level - Current:   Stand with Assist. Lift room needed: No. Bariatric: No   Needed: No   Isolation: No. Infection: Not Applicable.     Vital Signs:   Vitals:    03/10/19 1931 03/10/19 2000 03/10/19 2050   BP: 127/86 124/73 143/85   Pulse:  92    Resp: 18     Temp: 97.9  F (36.6  C)     TempSrc: Oral     SpO2: 97% 94% 99%       Cardiac Rhythm:  ,        Pain level: 0-10 Pain Scale: 4  Patient confused: No. Patient Falls Risk: Yes.   Elimination Status: Has voided   Patient Report - Initial Complaint:   Patient presents to ED due to infection to wounds.      States hx DM II      Noticed blister approx 2 weeks ago, increased swelling,burning sensation and hot to touch that developed approx \"3-4 days\"      Has hx of sore to pad of L foot for the past 2 years.     States \" I have had this sore shaved off several times. I had surgery on that foot in September. I noticed the sore to have worsened and now foul smell coming out of it\"     Denies fever      Increased pain when bearing weight            Focused Assessment: Musculoskeletal - Musculoskeletal WDL: -WDL except (Redness to right foot. 4th toe dark red with with white areas. +3 pitting edema to foot up to above ankle. C/O constant 3/10 pain to foot and toes. )  Abnormal Results:    Labs Ordered and Resulted from Time of ED Arrival Up to the Time of Departure from the ED   CBC WITH PLATELETS DIFFERENTIAL - Abnormal; Notable for the following components:       Result Value    RBC Count 3.91 (*)     Hemoglobin 11.3 (*)     Hematocrit 33.6 (*)     All other components within normal limits   BASIC METABOLIC PANEL - " Abnormal; Notable for the following components:    Glucose 300 (*)     Creatinine 1.58 (*)     GFR Estimate 44 (*)     GFR Estimate If Black 51 (*)     All other components within normal limits   ERYTHROCYTE SEDIMENTATION RATE AUTO - Abnormal; Notable for the following components:    Sed Rate 25 (*)     All other components within normal limits     Foot  XR, G/E 3 views, right   Preliminary Result   IMPRESSION:   1. No evidence of osteomyelitis by x-ray.   2. Moderate soft tissue swelling about the forefoot.   3. Second and third digit amputations.   4. Moderate hallux valgus.        Treatments provided:  ABX  Family Comments: Wife with patient  OBS brochure/video discussed/provided to patient:  N/A  ED Medications:   Medications   clindamycin (CLEOCIN) infusion 900 mg (900 mg Intravenous New Bag 3/10/19 8292)     Drips infusing:  No  For the majority of the shift, the patient's behavior Green. Interventions performed were None.     Severe Sepsis OR Septic Shock Diagnosis Present: No      ED Nurse Name/Phone Number: India Orourke,   9:44 PM    RECEIVING UNIT ED HANDOFF REVIEW    Above ED Nurse Handoff Report was reviewed: Yes  Reviewed by: Eli Swain on March 10, 2019 at 10:00 PM

## 2019-03-11 NOTE — PLAN OF CARE
Alert and oriented, independent.   VSS, RA, mild intermittent pain in R foot, declines interventions.   LS clear, BS active x4, voiding well.   RLE red w/in markings, hot, 2+/3+ edema.   MRI done--shows possible early osteomyelitis in 4th toe, possible surg tomorrow.   Cleocin dc'd, Unasyn ordered, saline locked.   K 3.2, replaced per protocol.   Podiatry, WOC, and ID consulted.   Will continue to monitor.

## 2019-03-11 NOTE — PLAN OF CARE
Pt admitted with RLE cellulitis this evening. States he has no sensation to BLE at baseline, but feels pain to RLE currently. Has multiple wounds to bottoms of both feet, as well as blisters to toes on R foot. RLE red, swollen, sore. Outlined with skin marker and elevated on pillows. Pt NPO until podiatry eval tomorrow. Ambulates assist of 1 with walker.

## 2019-03-11 NOTE — PHARMACY-ADMISSION MEDICATION HISTORY
"Admission medication history interview status for this patient is complete. See [facility] admission navigator for allergy information, prior to admission medications and immunization status.     Medication history interview source(s):Patient  Medication history resources (including written lists, pill bottles, clinic record):EPIC    Changes made to PTA medication list:  Added: none  Deleted: none  Changed: Aspart, Lantus    Medication reconciliation/reorder completed by provider prior to medication history? No    For patients on insulin therapy: Y (Y/N)  Lantus - 36 units qAM.  Novolog - 6-8 units TID meals + SS (not known, \"written on paper\")  Also, per pt: sometimes uses Insulin Aspart at night if BG is high    Prior to Admission medications    Medication Sig Last Dose Taking? Auth Provider   apixaban ANTICOAGULANT (ELIQUIS) 5 MG tablet Take 1 tablet (5 mg) by mouth 2 times daily 3/10/2019 at am Yes Gill Doty PA   atorvastatin (LIPITOR) 40 MG tablet Take 1 tablet (40 mg) by mouth every evening 3/9/2019 at Unknown time Yes Gill Doty PA   carvedilol (COREG) 25 MG tablet Take 1 tablet (25 mg) by mouth 2 times daily (with meals) 3/10/2019 at am Yes Gill Doty PA   Cholecalciferol (VITAMIN D3) 2000 units TABS Take 1 tablet by mouth daily 3/8/2019 Yes Unknown, Entered By History   furosemide (LASIX) 80 MG tablet Take 1 tablet (80 mg) by mouth daily 3/10/2019 at am Yes Gill Doty PA   Insulin Aspart (NOVOLOG SC) Inject 6-8 Units Subcutaneous 3 times daily (with meals) Plus Sliding Scale.  Also, per pt: sometimes uses Insulin Aspart at night if BG is high. 3/10/2019 at noon Yes Reported, Patient   insulin glargine (LANTUS SOLOSTAR PEN) 100 UNIT/ML pen Inject 36 Units Subcutaneous every morning 3/10/2019 at am Yes Unknown, Entered By History   insulin pen needle 31G X 6 MM Use  as directed.  Yes Vinicio Cook MD   isosorbide mononitrate (IMDUR) 30 MG 24 hr tablet Take 1 tablet (30 mg) by mouth daily " 3/10/2019 at am Yes Johnathan Mckeon MD   levothyroxine (SYNTHROID, LEVOTHROID) 137 MCG tablet Take 137 mcg by mouth At Bedtime  3/9/2019 at hs Yes Henrry Figueroa PA-C   lisinopril (PRINIVIL/ZESTRIL) 5 MG tablet Take 1 tablet (5 mg) by mouth daily 3/10/2019 at am Yes Gill Doty PA   nitroglycerin (NITROSTAT) 0.4 MG sublingual tablet Place 1 tablet (0.4 mg) under the tongue every 5 minutes as needed for chest pain  Yes Davion Cordova PA-C   pantoprazole (PROTONIX) 40 MG enteric coated tablet Take 1 tablet (40 mg) by mouth every morning (before breakfast) 3/10/2019 at am Yes Ana Frankel MD   potassium chloride ER (K-DUR/KLOR-CON M) 10 MEQ CR tablet Take 1 tablet (10 mEq) by mouth 2 times daily 3/10/2019 at am Yes Justin Tomlin MD

## 2019-03-11 NOTE — CONSULTS
Consult Date:  03/11/2019      INFECTIOUS DISEASE CONSULTATION      REFERRING PHYSICIAN:  Dr. Oscar Lyles.      IMPRESSION:   1.  A 68-year-old male, well known to me, admitted with new right fourth toe infection with cellulitis into the foot and to the leg.  No major systemic toxicity.   2.  Prior history of multiple recurrent foot infections, both left and right, most recently right foot amputations.   3.  Diabetes mellitus with neuropathy.   4.  Ischemic cardiomyopathy.      RECOMMENDATIONS:   1.  Simplify to Unasyn, covers the prior microbiology, await current cultures and operative findings, and readjust.   2.  If all major infection resected probably oral therapy will be acceptable.      HISTORY:  This 68-year-old male is seen in consultation due to a right diabetic foot infection.  The patient is well known to us, having seen him as recently as January.  He has had recurrent left and right foot infections including amputations on the right.  The right foot has had amputations of second and third toes and now presents with increasing right fourth toe inflammation and discomfort and ulceration.  In addition, he has some redness and swelling into his foot and into his lower leg.  Has not had major systemic toxicity.  MRI scan did not show clear osteo but probable amputation of the toe is planned.      PAST MEDICAL HISTORY:  Prior recurrent foot infections bilaterally, history of diabetes with significant neuropathy, history of hypertension, history of hypothyroidism.      ALLERGIES:  No antimicrobial allergies.      MEDICATIONS:  As listed.      REVIEW OF SYSTEMS:  Largely as listed above.  No major fevers, chills or sweats.      PHYSICAL EXAMINATION:   GENERAL:  The patient appears his stated age, looks well.   VITAL SIGNS:  Currently normal, including being afebrile.   HEENT:  No thrush or intraoral lesions.  Pupils reactive.   NECK:  Supple and nontender.   HEART:  Regular rhythm.   LUNGS:  Clear  bilaterally.   ABDOMEN:  Soft, nontender.   EXTREMITIES:  No rashes or skin lesions of note in his upper extremities.  The left foot looks clean and unremarkable, although he has a slight plantar ulcer in the first metatarsal area with slight eschar present.  The right fourth toe is inflamed with necrosis distally, some erythema into the foot, and to a lesser extent the lower leg.         ELIZA GARNICA MD             D: 2019   T: 2019   MT: ALYCIA      Name:     PORTER YANCEY   MRN:      [id number]        Account:       [id number]   :      1950           Consult Date:  2019      Document: [id number]

## 2019-03-11 NOTE — ED PROVIDER NOTES
History     Chief Complaint:  other      HPI   Jayro Elder is a 68 year old male status post coronary angiogram, on Eliquis for Afib, and with a history of diabetes type 2 on insulin, hypertension, and cellulitis who presents with other. The patient reports to have a history of left food pain ulcer over the past 2 years with multiple follow ups to have the sore shaved and surgery last September. He reports that this sore has recently worsened in pain with foul odor. He also reports that 2 weeks ago he developed a blister to his right foot, which has become red and hot 4 days ago. He further reports his right foot blister became markedly painful and swollen today with increased pain while bearing weight, prompting his presentation. He denies current or recent antibiotic use, but notes such use a few months ago for gangrene. He denies fever, chills, and any other medical issues.       Allergies:  No known drug allergies    Medications:    apixaban ANTICOAGULANT (ELIQUIS) 5 MG tablet  atorvastatin (LIPITOR) 40 MG tablet  carvedilol (COREG) 25 MG tablet  furosemide (LASIX) 80 MG tablet  Insulin Aspart (NOVOLOG SC)  Insulin Glargine (LANTUS SC)  isosorbide mononitrate (IMDUR) 30 MG 24 hr tablet  levothyroxine (SYNTHROID, LEVOTHROID) 137 MCG tablet  lisinopril (PRINIVIL/ZESTRIL) 5 MG tablet  nitroglycerin (NITROSTAT) 0.4 MG sublingual tablet  pantoprazole (PROTONIX) 40 MG enteric coated tablet  potassium chloride ER (K-DUR/KLOR-CON M) 10 MEQ CR tablet  Cholecalciferol (VITAMIN D3) 2000 units TABS    Past Medical History:    CAD (coronary artery disease)   Cancer (H)   Cardiomyopathy (H)   Cellulitis of left lower extremity   Cerebral infarction (H)   Diabetic ulcer of left midfoot associated with type 2 diabetes mellitus, with fat layer exposed (H)   Essential hypertension, benign   Generalized osteoarthrosis, unspecified site   Microalbuminuria   Myocardial infarction (H)   Neuropathy   Sepsis (H)   Sleep  apnea   Substance abuse (H)   Syncope, unspecified syncope type   Thyroid disease   Tobacco use disorder   Type 2 diabetes mellitus with diabetic peripheral angiopathy without gangrene, unspecified long term insulin use status  CVA (cerebrovascular accident)   Acute kidney insufficiency   Acute systolic heart failure (H)   CHF (congestive heart failure) (H)   Cellulitis of right lower leg   Bacteremia   Pressure ulcer of toe of right foot, stage 3 (H)   Cellulitis of left lower extremity    Homonymous hemianopsia, right   Chronic atrial fibrillation (H)   coronary angiogram   Health Care Home   CARDIOVASCULAR SCREENING; LDL GOAL LESS THAN 100   Cardiovascular disease   Benign neoplasm of lower jaw bone   Generalized osteoarthrosis, unspecified site   Tobacco use disorder   Hypertension goal BP (blood pressure) < 140/90     Past Surgical History:    AMPUTATE TOE(S) x2    ANGIOGRAM x4    back surgery x3   Laminectomy x 3    Bunionectomy, Bilateral  Gingival surgery at age 9  HEART CATH LEFT HEART CATH   INCISION AND DRAINAGE TOE, COMBINED   ORTHOPEDIC SURGERY, left shoulder  SOFT TISSUE SURGERY   VASCULAR SURGERY     Family History:    Brother: cardiovascular  Colorectal Cancer  Diabetes  Thyroid Disease  Arthritis  Cancer    Social History:  Marital Status:     Tobacco Status: Former 1 PPD Smoker, Quit in 2005; Never Used Smokeless   Alcohol Use: No  Drug Use: No  Presents: By Spouse       Review of Systems  All other reviewed and neg    Physical Exam     Patient Vitals for the past 24 hrs:   BP Temp Temp src Pulse Heart Rate Resp SpO2   03/10/19 2050 143/85 -- -- -- -- -- 99 %   03/10/19 2000 124/73 -- -- 92 -- -- 94 %   03/10/19 1931 127/86 97.9  F (36.6  C) Oral -- 83 18 97 %       Physical Exam  Constitutional: Patient is well appearing. No distress.  Head: Atraumatic.  Mouth/Throat: Oropharynx is clear and moist. No oropharyngeal exudate.  Eyes: Conjunctivae and EOM are normal. No scleral icterus.  Neck:  Normal range of motion. Neck supple.   Cardiovascular: Normal rate, regular rhythm, normal heart sounds and intact distal pulses.   Pulmonary/Chest: Breath sounds normal. No respiratory distress.  Abdominal: Soft. Bowel sounds are normal. No distension. No tenderness. No rebound or guarding.   Musculoskeletal: Normal range of motion. No edema or tenderness.   Neurological: Alert and orientated to person, place, and time. No observable focal neuro deficit  Skin: Warm and dry. No rash noted. Not diaphoretic. Right 4th toe tip is gangrenous; swelling and redness up to chin. Left greater plantar prominence has chronic ulcer on the left.     Emergency Department Course     Imaging:  Radiographic findings were communicated with the patient and Admitting MD who voiced understanding of the findings.  X-ray Foot, Right, G/E 3 views:  IMPRESSION:  1. No evidence of osteomyelitis by x-ray.  2. Moderate soft tissue swelling about the forefoot.  3. Second and third digit amputations.  4. Moderate hallux valgus.  Result per radiology.     Laboratory:  Erythrocyte Sed Rate Auto: 25 (H)  BMP: Glucose 300 (H), Creatinine 1.58 (H), GFR 44 (L) o/w WNL  CBC: RBC 3.91 (L), HGB 11.3 (L), HCT 33.6 (L) o/w WNL (WBC 9.0, )    Interventions:  2134: Cleocin Infusion 900 mg IV    Emergency Department Course:  Past medical records, nursing notes, and vitals reviewed.  2005: I performed an exam of the patient and obtained history, as documented above.   IV inserted and blood drawn. The patient was placed on continuous cardiac monitoring and pulse oximetry.  The patient was sent for a Right Foot X-ray while in the emergency department, findings above.  2108: I discussed the case with Dr. Purcell of Hospitalist Service regarding the patient.  Findings and plan explained to the Patient and spouse who consents to admission.   2109: Discussed the patient with Dr. Purcell of Hospitalist Service, who will admit the patient to a MED/SURG bed for  further monitoring, evaluation, and treatment.      Impression & Plan      Medical Decision Making:  DM foot infection admit for further workup and mgmt.    Diagnosis:    ICD-10-CM    1. Cellulitis of right lower extremity L03.115 CBC with platelets differential     Basic metabolic panel     Erythrocyte sedimentation rate auto       Disposition:  Admitted to MED/SURG    Charles Arrieta  3/10/2019   [facility] EMERGENCY DEPARTMENT  ICharles, am serving as a scribe at 8:05 PM on 3/10/2019 to document services personally performed by Antonio Beaver MD based on my observations and the provider's statements to me.         Antonio Beaver MD  03/10/19 1182

## 2019-03-11 NOTE — PLAN OF CARE
Orientation: Alert and oriented x4. Flat affect. Irritable at times  VSS. 94% on RA. Afebrile.   LS: diminished but clear LS throughout  GI: Passing gas. no BM. Denies N/V.   : Adequate urine output.   Skin: blanchable redness,  shiny right LE. Minimal receeding of redness to outline. Multiple wounds, cracked, dry, skin to  bilateral LE.   Activity: SBA w/ walker. Up to bathroom overnight. Pt slept comfortably throughout shift.   Pain: 3/10 right foot pain. Declined PRN pain medications offered overnight  Plan: Continue with current cares.

## 2019-03-11 NOTE — ED TRIAGE NOTES
"Patient presents to ED due to infection to wounds.     States hx DM II     Noticed blister approx 2 weeks ago, increased swelling,burning sensation and hot to touch that developed approx \"3-4 days\"     Has hx of sore to pad of L foot for the past 2 years.    States \" I have had this sore shaved off several times. I had surgery on that foot in September. I noticed the sore to have worsened and now foul smell coming out of it\"    Denies fever     Increased pain when bearing weight    "

## 2019-03-11 NOTE — PROGRESS NOTES
"Focus: foot wound  D: per admission MD note: 68 year old male admitted on 3/10/2019. He presents with right foot pain/swelling and blister.  Cellulitis   Diabetic Foot Ulcers  Hx of Osteomyelitis:   Assessment: Presents with right lower extremity swelling/edema/erythema with fourth toe that appears necrotic.  Initial foot x-ray showed no evidence of osteomyelitis, but I do have concern that he has deep infection in addition to associated cellulitis.   Podiatry, WOC and ID consulted.    Left foot:   1st met Plantar: 3x2cm dark red callous  2nd tip of toe:  1x1cm intact dry white callous    Right foot:   Medial Great ToeL 1x1cm intact white blister  1st met plantar: thick dry callous  4th toe tip and nail bed: purple, pt reports nail bed \"hanging and flopping\" bloody.    I: assessment and discussion with pt, supplies to dress brought to room.    A/P: thick calloused areas on both feet and loose bloody toe nail to right 4th toe in need of DPM intervention.    Will follow after pt has been seen by Podiatry if needed  Wash BLE and feet and apply Sween to dry calloused areas and intact skin.      "

## 2019-03-11 NOTE — CONSULTS
"PATIENT HISTORY:  Jayro Elder is a 68 year old male who was admitted for right leg cellulitis.      I was asked to see Jayro Elder  by  for right foot ulcer and cellulitis.    Patient was seen at bedside.  Well known to our service. Previous right partial 2nd and 3rd amputations. States he noticed a blister to the toe about 2 weeks ago and then \"watched it get israel and israel\". Came in due to redness to leg. Also notes left foot callus has been \"leaking\" again.      Review of Systems:  Patient denies fever, chills, rash, stiffness, limping, weakness, heart burn, blood in stool, chest pain with activity, calf pain when walking, shortness of breath with activity, chronic cough, easy bleeding/bruising, excessive thirst, fatigue, depression, anxiety.  Patient admits to wound, numbness, swelling.     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     Cardiomyopathy (H)      Cellulitis of left lower extremity 3/13/2018     Cerebral infarction (H)     eye sight decreased in peripheral of right side and blurry     Diabetic ulcer of left midfoot associated with type 2 diabetes mellitus, with fat layer exposed (H) 3/13/2018     Essential hypertension, benign 11/13/2002     Generalized osteoarthrosis, unspecified site 11/13/2002     Microalbuminuria 3/13/2018    X1     Myocardial infarction (H)      Neuropathy      Sepsis (H)      Sleep apnea     doesn't use it     Substance abuse (H)      Syncope, unspecified syncope type 3/13/2018     Thyroid disease     takes medicine     Tobacco use disorder 11/13/2002     Type 2 diabetes mellitus with diabetic peripheral angiopathy without gangrene, unspecified long term insulin use status 2005        PAST SURGICAL HISTORY:   Past Surgical History:   Procedure Laterality Date     AMPUTATE TOE(S) Right " 1/6/2019    Procedure: Right open second toe partial amputation;  Surgeon: Sabino Garcia DPM;  Location: [facility] OR     AMPUTATE TOE(S) Right 1/8/2019    Procedure: 1.  Revision right second toe amputation with resection of distal half of proximal phalanx with full closure.    2.  Debridement of callus/preulcerative lesion, distal left second toe and plantar left first metatarsophalangeal joint.;  Surgeon: Ryan Bhagat DPM;  Location: [facility] OR     ANGIOGRAM  6/29/05    subtotal occ.mid LAD,SUSAN mid LAD     ANGIOGRAM  7/05    mild CAD,patent stent,no flow-limiting lesions,sev.LV dysf.LAD enlarged     ANGIOGRAM  2/09    Sev.single vessel disease,Mod LV dysf.distal LAD 90%,70-75% mid lad just before prev stent,SUSAN to prox.mid LAD, endeavor to distal LAD     ANGIOGRAM  11/13/13    restenosis, stent LAD     BACK SURGERY      back surgery x3     C NONSPECIFIC PROCEDURE      Laminectomy x 3 - (1983 x 2 & 1990)     C NONSPECIFIC PROCEDURE Bilateral 1998    Bunionectomy     C NONSPECIFIC PROCEDURE  1959    Gingival surgery at age 9     HEART CATH LEFT HEART CATH  06/13/2017    SUSAN to OM3     INCISION AND DRAINAGE TOE, COMBINED Bilateral 9/11/2018    Procedure: COMBINED INCISION AND DRAINAGE TOE;  1) Irrigation and debridement ulcer left great toe  2) Amputation 3rd toe right foot at distal interphalangeal joint level;  Surgeon: Miki Harp MD;  Location: [facility] OR     ORTHOPEDIC SURGERY      bunion surgery both feet     ORTHOPEDIC SURGERY      left shoulder     SOFT TISSUE SURGERY      debridement of toe numerous time     VASCULAR SURGERY      7 stents in heart        MEDICATIONS:   Current Facility-Administered Medications:      acetaminophen (TYLENOL) tablet 650 mg, 650 mg, Oral, Q4H PRN, Werner Purcell MD     atorvastatin (LIPITOR) tablet 40 mg, 40 mg, Oral, QPM, Werner Purcell MD     carvedilol (COREG) tablet 25 mg, 25 mg, Oral, BID w/meals, Werner Purcell MD, 25 mg at 03/11/19 0834     clindamycin (CLEOCIN)  infusion 900 mg, 900 mg, Intravenous, Q8H, Werner Purcell MD, Last Rate: 50 mL/hr at 03/11/19 1237, 900 mg at 03/11/19 1237     glucose gel 15-30 g, 15-30 g, Oral, Q15 Min PRN **OR** dextrose 50 % injection 25-50 mL, 25-50 mL, Intravenous, Q15 Min PRN **OR** glucagon injection 1 mg, 1 mg, Subcutaneous, Q15 Min PRN, Werner Purcell MD     furosemide (LASIX) tablet 80 mg, 80 mg, Oral, Daily, Werner Purcell MD, 80 mg at 03/11/19 0833     influenza Vac Split High-Dose (FLUZONE) injection 0.5 mL, 0.5 mL, Intramuscular, Prior to discharge, Werner Purcell MD     insulin aspart (NovoLOG) inj (RAPID ACTING), 1-4 Units, Subcutaneous, Q4H, Werner Purcell MD, 1 Units at 03/11/19 1252     insulin glargine (LANTUS) injection 20 Units, 20 Units, Subcutaneous, At Bedtime, Werner Purcell MD, 20 Units at 03/10/19 2348     isosorbide mononitrate (IMDUR) 24 hr tablet 30 mg, 30 mg, Oral, Daily, Werner Purcell MD, 30 mg at 03/11/19 0833     levothyroxine (SYNTHROID/LEVOTHROID) tablet 137 mcg, 137 mcg, Oral, At Bedtime, Werner Purcell MD     magnesium sulfate 4 g in 100 mL sterile water (premade), 4 g, Intravenous, Q4H PRN, Oscar Lyles MD     melatonin tablet 1 mg, 1 mg, Oral, At Bedtime PRN, Werner Purcell MD     naloxone (NARCAN) injection 0.1-0.4 mg, 0.1-0.4 mg, Intravenous, Q2 Min PRN, Werner Purcell MD     ondansetron (ZOFRAN-ODT) ODT tab 4 mg, 4 mg, Oral, Q6H PRN **OR** ondansetron (ZOFRAN) injection 4 mg, 4 mg, Intravenous, Q6H PRN, Werner Purcell MD     oxyCODONE (ROXICODONE) tablet 5-10 mg, 5-10 mg, Oral, Q3H PRN, Werner Purcell MD     pantoprazole (PROTONIX) EC tablet 40 mg, 40 mg, Oral, QAM AC, Werner Purcell MD, 40 mg at 03/11/19 0833     Patient is already receiving anticoagulation with heparin, enoxaparin (LOVENOX), warfarin (COUMADIN)  or other anticoagulant medication, , Does not apply, Continuous PRN, Werner Purcell MD     potassium chloride (KLOR-CON) Packet 20-40 mEq, 20-40 mEq, Oral or Feeding Tube, Q2H PRN, Oscar Lyles MD      potassium chloride 10 mEq in 100 mL intermittent infusion with 10 mg lidocaine, 10 mEq, Intravenous, Q1H PRN, Oscar Lyles MD     potassium chloride 10 mEq in 100 mL sterile water intermittent infusion (premix), 10 mEq, Intravenous, Q1H PRN, Oscar Lyles MD     potassium chloride 20 mEq in 50 mL intermittent infusion, 20 mEq, Intravenous, Q1H PRN, Oscar Lyles MD     potassium chloride ER (K-DUR/KLOR-CON M) CR tablet 20-40 mEq, 20-40 mEq, Oral, Q2H PRN, Oscar Lyles MD, 20 mEq at 19 1237     ALLERGIES:    Allergies   Allergen Reactions     No Known Allergies         SOCIAL HISTORY:   Social History     Socioeconomic History     Marital status:      Spouse name: Not on file     Number of children: Not on file     Years of education: Not on file     Highest education level: Not on file   Occupational History     Not on file   Social Needs     Financial resource strain: Not on file     Food insecurity:     Worry: Not on file     Inability: Not on file     Transportation needs:     Medical: Not on file     Non-medical: Not on file   Tobacco Use     Smoking status: Former Smoker     Packs/day: 1.00     Years: 25.00     Pack years: 25.00     Types: Cigarettes     Last attempt to quit: 2005     Years since quittin.6     Smokeless tobacco: Never Used   Substance and Sexual Activity     Alcohol use: No     Alcohol/week: 2.4 oz     Types: 4 Shots of liquor per week     Frequency: Never     Comment: almost every day     Drug use: No     Sexual activity: Yes     Partners: Female   Lifestyle     Physical activity:     Days per week: Not on file     Minutes per session: Not on file     Stress: Not on file   Relationships     Social connections:     Talks on phone: Not on file     Gets together: Not on file     Attends Christianity service: Not on file     Active member of club or organization: Not on file     Attends meetings of clubs or organizations: Not on file     Relationship  "status: Not on file     Intimate partner violence:     Fear of current or ex partner: Not on file     Emotionally abused: Not on file     Physically abused: Not on file     Forced sexual activity: Not on file   Other Topics Concern     Parent/sibling w/ CABG, MI or angioplasty before 65F 55M? Yes      Service Not Asked     Blood Transfusions Not Asked     Caffeine Concern No     Comment: 3 cups daily     Occupational Exposure Not Asked     Hobby Hazards Not Asked     Sleep Concern Not Asked     Stress Concern Not Asked     Weight Concern Not Asked     Special Diet Yes     Comment: watching carb intake     Back Care Not Asked     Exercise No     Comment: some walking     Bike Helmet Not Asked     Seat Belt Not Asked     Self-Exams Not Asked   Social History Narrative     Not on file        FAMILY HISTORY:   Family History   Problem Relation Age of Onset     Cancer - colorectal Sister      Thyroid Disease Brother      Cardiovascular Brother      Diabetes Brother      Cancer Father         tumor in chest and throat     Arthritis Mother      Thyroid Disease Mother      Diabetes Mother      Glaucoma No family hx of      Macular Degeneration No family hx of         EXAM:Vitals: /70 (BP Location: Left arm)   Pulse 92   Temp 98.7  F (37.1  C) (Oral)   Resp 16   Ht 1.93 m (6' 4\")   Wt 109.3 kg (241 lb)   SpO2 97%   BMI 29.34 kg/m    BMI= Body mass index is 29.34 kg/m .    LABS:    WBC   Date Value Ref Range Status   03/11/2019 7.3 4.0 - 11.0 10e9/L Final       HA1C: 11. 7 (1/2019)    ESR: 25    General appearance: Patient is alert and fully cooperative with history & exam.  No sign of distress is noted during the visit.      Psychiatric: Affect is pleasant & appropriate.  Patient appears motivated to improve health.       Respiratory: Breathing is regular & unlabored while sitting.      HEENT: Hearing is intact to spoken word.  Speech is clear.  No gross evidence of visual impairment that would impact " ambulation.       Dermatologic: right foot distal tip of 4th toe is black. Streaking redness to dorsal foot. Nail is loose. Left plantar 1st metatarsal head pre ulcerative lesion measures roughly 2.0cm x 2.0cm.      Vascular: DP & PT pulses are faintly palpable bilaterally.   edema and varicosities noted to both lower.  CFT and skin temperature is normal to both lower extremities.     Neurologic: Lower extremity sensation is absent.      Musculoskeletal: Patient is ambulatory without assistive device or brace.  Partial right 2nd and 3rd toe amputation.     IMAGING: MRI right foot-  Bone marrow edema without definite cortical effacement involving the distal phalanx of the fourth toe associated with surrounding soft tissue edema and swelling. The findings are not entirely specific, but early osteomyelitis in the distal phalanx is suspected. Interval revision of second toe amputation which is now at the mid proximal phalanx level.   No other changes since the prior exam     ASSESSMENT: 68 yr old diabetic male with ulcer right 4th toe, cellulitis and osteomyelitis, pre ulcerative lesion left foot.      PLAN:  Reviewed patient's chart in epic.  -discussed mri results.  -recommend partial right 4th toe amputation and I&D of left foot ulcer.   - on for Dr. Bhagat tomorrow at 4:30pm.  -NPO at 8am tomorrow. Orders placed.       Татьяна Mckeon DPM, Podiatry/Foot and Ankle Surgery    1:38 PM

## 2019-03-11 NOTE — CONSULTS
Care Transition Initial Assessment - RN        Met with: Patient.  DATA   Active Problems:    Hypertension goal BP (blood pressure) < 140/90    Diabetes mellitus, type 2 (H)    Cardiomyopathy (H)    CVA (cerebral vascular accident) (H)    Chronic atrial fibrillation (H)    Cellulitis of left lower extremity    Diabetic ulcer of left midfoot associated with type 2 diabetes mellitus, with fat layer exposed (H)    Type 2 diabetes mellitus with diabetic peripheral angiopathy without gangrene (H)    Cellulitis    CHF (congestive heart failure) (H)    History of CVA (cerebrovascular accident)    Hyperlipidemia    Hypothyroidism       Cognitive Status: awake, alert and oriented.        Contact information and PCP information verified: Pt doesn't have a primary MD, He declines to have one as he follows with speciality MD's.   Lives With: spouse                     Insurance concerns: No Insurance issues identified  ASSESSMENT  Patient currently receives the following services:  none        Identified issues/concerns regarding health management: Pt is admitted with a right foot ulcer.  Pt said they were caused [place] and then didn't know he developed blisters.  [place].  Pt use to follow with [facility] with [facility].  However, he doesn't drive d/t his vision and he has neuropathy.  His wife provides transportation for him, but she works Monday-Friday 8:30-5pm.  She is suppose to retire at the end of May.  Pt's A1c was 11.7 as of 1/5/19.  Pt said before that time he had a different insurance and he hadn't been filling or taking his medications.  Pt now has a One Touch and takes his novolog and Lantus.  However, he said his blood glucose still has large ranges from 150-400.  He also said his strips are expensive.  He said [facility] pharmacy is looking into it.  I consulted our pharmacy liaison and she said pt does have coverage for Accu Check glucometer and testing strips for three times a  day for $17 count 100.  Pt declines for me to order this product for him and wants to wait.  He is scheduled with Dr Scott [facility] in [place] in June.  He doesn't want to move this appt up since he doesn't have transportation earlier.  He follows with cardiology for his CHF.  He weighs himself daily and monitors his salt intake.  Pt declines to reestablish care with primary care d/t his transportation issue.  He doesn't want the primary care clinic calling all the time checking up on him when he can't get to the clinic.  No hand off will be given at discharge.      PLAN  Patient given options and choices for discharge yes .  Patient/family is agreeable to the plan?  Yes:   Patient anticipates discharging to home .        Patient anticipates needs for home equipment: No  Plan/Disposition: Home   Appointments:  Dr Scott as scheduled in June for endocrinology       Care  (CTS) will continue to follow as needed.    Denise RICE CTS [id number]

## 2019-03-11 NOTE — CONSULTS
ID consult dictated IMP 1 69 yo male L DFI, 4th toe, L plantar 1st met area ulcer    REC unasyn , based on prior, await ortho plan and adjust

## 2019-03-12 ENCOUNTER — ANESTHESIA EVENT (OUTPATIENT)
Dept: SURGERY | Facility: CLINIC | Age: 69
DRG: 617 | End: 2019-03-12
Payer: COMMERCIAL

## 2019-03-12 ENCOUNTER — ANESTHESIA (OUTPATIENT)
Dept: SURGERY | Facility: CLINIC | Age: 69
DRG: 617 | End: 2019-03-12
Payer: COMMERCIAL

## 2019-03-12 ENCOUNTER — APPOINTMENT (OUTPATIENT)
Dept: GENERAL RADIOLOGY | Facility: CLINIC | Age: 69
DRG: 617 | End: 2019-03-12
Attending: PODIATRIST
Payer: COMMERCIAL

## 2019-03-12 LAB
ANION GAP SERPL CALCULATED.3IONS-SCNC: 7 MMOL/L (ref 3–14)
BUN SERPL-MCNC: 21 MG/DL (ref 7–30)
CALCIUM SERPL-MCNC: 8.6 MG/DL (ref 8.5–10.1)
CHLORIDE SERPL-SCNC: 102 MMOL/L (ref 94–109)
CO2 SERPL-SCNC: 29 MMOL/L (ref 20–32)
CREAT SERPL-MCNC: 1.29 MG/DL (ref 0.66–1.25)
ERYTHROCYTE [DISTWIDTH] IN BLOOD BY AUTOMATED COUNT: 12.9 % (ref 10–15)
GFR SERPL CREATININE-BSD FRML MDRD: 56 ML/MIN/{1.73_M2}
GLUCOSE BLDC GLUCOMTR-MCNC: 151 MG/DL (ref 70–99)
GLUCOSE BLDC GLUCOMTR-MCNC: 162 MG/DL (ref 70–99)
GLUCOSE BLDC GLUCOMTR-MCNC: 175 MG/DL (ref 70–99)
GLUCOSE BLDC GLUCOMTR-MCNC: 186 MG/DL (ref 70–99)
GLUCOSE BLDC GLUCOMTR-MCNC: 207 MG/DL (ref 70–99)
GLUCOSE BLDC GLUCOMTR-MCNC: 262 MG/DL (ref 70–99)
GLUCOSE SERPL-MCNC: 181 MG/DL (ref 70–99)
HCT VFR BLD AUTO: 31.1 % (ref 40–53)
HGB BLD-MCNC: 10.4 G/DL (ref 13.3–17.7)
MCH RBC QN AUTO: 28.9 PG (ref 26.5–33)
MCHC RBC AUTO-ENTMCNC: 33.4 G/DL (ref 31.5–36.5)
MCV RBC AUTO: 86 FL (ref 78–100)
PLATELET # BLD AUTO: 184 10E9/L (ref 150–450)
POTASSIUM SERPL-SCNC: 3.5 MMOL/L (ref 3.4–5.3)
RBC # BLD AUTO: 3.6 10E12/L (ref 4.4–5.9)
SODIUM SERPL-SCNC: 138 MMOL/L (ref 133–144)
WBC # BLD AUTO: 5.7 10E9/L (ref 4–11)

## 2019-03-12 PROCEDURE — 25000131 ZZH RX MED GY IP 250 OP 636 PS 637: Performed by: STUDENT IN AN ORGANIZED HEALTH CARE EDUCATION/TRAINING PROGRAM

## 2019-03-12 PROCEDURE — 36415 COLL VENOUS BLD VENIPUNCTURE: CPT | Performed by: INTERNAL MEDICINE

## 2019-03-12 PROCEDURE — 73660 X-RAY EXAM OF TOE(S): CPT | Mod: RT

## 2019-03-12 PROCEDURE — 27210794 ZZH OR GENERAL SUPPLY STERILE: Performed by: PODIATRIST

## 2019-03-12 PROCEDURE — 25000132 ZZH RX MED GY IP 250 OP 250 PS 637: Performed by: STUDENT IN AN ORGANIZED HEALTH CARE EDUCATION/TRAINING PROGRAM

## 2019-03-12 PROCEDURE — 36000052 ZZH SURGERY LEVEL 2 EA 15 ADDTL MIN: Performed by: PODIATRIST

## 2019-03-12 PROCEDURE — 25000128 H RX IP 250 OP 636: Performed by: PODIATRIST

## 2019-03-12 PROCEDURE — 0JBR0ZZ EXCISION OF LEFT FOOT SUBCUTANEOUS TISSUE AND FASCIA, OPEN APPROACH: ICD-10-PCS | Performed by: PODIATRIST

## 2019-03-12 PROCEDURE — 28112 PART REMOVAL OF METATARSAL: CPT | Mod: 79 | Performed by: PODIATRIST

## 2019-03-12 PROCEDURE — 37000008 ZZH ANESTHESIA TECHNICAL FEE, 1ST 30 MIN: Performed by: PODIATRIST

## 2019-03-12 PROCEDURE — 87075 CULTR BACTERIA EXCEPT BLOOD: CPT | Performed by: PODIATRIST

## 2019-03-12 PROCEDURE — 12000000 ZZH R&B MED SURG/OB

## 2019-03-12 PROCEDURE — 25000128 H RX IP 250 OP 636: Performed by: INTERNAL MEDICINE

## 2019-03-12 PROCEDURE — 0Y6V0Z1 DETACHMENT AT RIGHT 4TH TOE, HIGH, OPEN APPROACH: ICD-10-PCS | Performed by: PODIATRIST

## 2019-03-12 PROCEDURE — 99233 SBSQ HOSP IP/OBS HIGH 50: CPT | Performed by: INTERNAL MEDICINE

## 2019-03-12 PROCEDURE — 85027 COMPLETE CBC AUTOMATED: CPT | Performed by: INTERNAL MEDICINE

## 2019-03-12 PROCEDURE — 87076 CULTURE ANAEROBE IDENT EACH: CPT | Performed by: PODIATRIST

## 2019-03-12 PROCEDURE — 00000146 ZZHCL STATISTIC GLUCOSE BY METER IP

## 2019-03-12 PROCEDURE — 25800030 ZZH RX IP 258 OP 636: Performed by: NURSE ANESTHETIST, CERTIFIED REGISTERED

## 2019-03-12 PROCEDURE — 97597 DBRDMT OPN WND 1ST 20 CM/<: CPT | Mod: 79 | Performed by: PODIATRIST

## 2019-03-12 PROCEDURE — 36000050 ZZH SURGERY LEVEL 2 1ST 30 MIN: Performed by: PODIATRIST

## 2019-03-12 PROCEDURE — 87070 CULTURE OTHR SPECIMN AEROBIC: CPT | Performed by: PODIATRIST

## 2019-03-12 PROCEDURE — 25000125 ZZHC RX 250: Performed by: NURSE ANESTHETIST, CERTIFIED REGISTERED

## 2019-03-12 PROCEDURE — 80048 BASIC METABOLIC PNL TOTAL CA: CPT | Performed by: INTERNAL MEDICINE

## 2019-03-12 PROCEDURE — 25000132 ZZH RX MED GY IP 250 OP 250 PS 637: Performed by: PODIATRIST

## 2019-03-12 PROCEDURE — 25000128 H RX IP 250 OP 636: Performed by: NURSE ANESTHETIST, CERTIFIED REGISTERED

## 2019-03-12 PROCEDURE — 37000009 ZZH ANESTHESIA TECHNICAL FEE, EACH ADDTL 15 MIN: Performed by: PODIATRIST

## 2019-03-12 PROCEDURE — 87186 SC STD MICRODIL/AGAR DIL: CPT | Performed by: PODIATRIST

## 2019-03-12 PROCEDURE — 71000027 ZZH RECOVERY PHASE 2 EACH 15 MINS: Performed by: PODIATRIST

## 2019-03-12 PROCEDURE — 87077 CULTURE AEROBIC IDENTIFY: CPT | Performed by: PODIATRIST

## 2019-03-12 PROCEDURE — 25000132 ZZH RX MED GY IP 250 OP 250 PS 637: Performed by: INTERNAL MEDICINE

## 2019-03-12 PROCEDURE — 40000306 ZZH STATISTIC PRE PROC ASSESS II: Performed by: PODIATRIST

## 2019-03-12 PROCEDURE — 11042 DBRDMT SUBQ TIS 1ST 20SQCM/<: CPT | Mod: 79 | Performed by: PODIATRIST

## 2019-03-12 RX ORDER — SODIUM CHLORIDE, SODIUM LACTATE, POTASSIUM CHLORIDE, CALCIUM CHLORIDE 600; 310; 30; 20 MG/100ML; MG/100ML; MG/100ML; MG/100ML
INJECTION, SOLUTION INTRAVENOUS CONTINUOUS PRN
Status: DISCONTINUED | OUTPATIENT
Start: 2019-03-12 | End: 2019-03-12

## 2019-03-12 RX ORDER — FENTANYL CITRATE 50 UG/ML
INJECTION, SOLUTION INTRAMUSCULAR; INTRAVENOUS PRN
Status: DISCONTINUED | OUTPATIENT
Start: 2019-03-12 | End: 2019-03-12

## 2019-03-12 RX ORDER — FENTANYL CITRATE 50 UG/ML
25-50 INJECTION, SOLUTION INTRAMUSCULAR; INTRAVENOUS
Status: DISCONTINUED | OUTPATIENT
Start: 2019-03-12 | End: 2019-03-12 | Stop reason: HOSPADM

## 2019-03-12 RX ORDER — PROPOFOL 10 MG/ML
INJECTION, EMULSION INTRAVENOUS CONTINUOUS PRN
Status: DISCONTINUED | OUTPATIENT
Start: 2019-03-12 | End: 2019-03-12

## 2019-03-12 RX ORDER — SODIUM CHLORIDE, SODIUM LACTATE, POTASSIUM CHLORIDE, CALCIUM CHLORIDE 600; 310; 30; 20 MG/100ML; MG/100ML; MG/100ML; MG/100ML
INJECTION, SOLUTION INTRAVENOUS CONTINUOUS
Status: DISCONTINUED | OUTPATIENT
Start: 2019-03-12 | End: 2019-03-12 | Stop reason: HOSPADM

## 2019-03-12 RX ORDER — ACETAMINOPHEN 325 MG/1
975 TABLET ORAL EVERY 8 HOURS
Status: DISPENSED | OUTPATIENT
Start: 2019-03-12 | End: 2019-03-15

## 2019-03-12 RX ORDER — HYDROMORPHONE HYDROCHLORIDE 1 MG/ML
.3-.5 INJECTION, SOLUTION INTRAMUSCULAR; INTRAVENOUS; SUBCUTANEOUS EVERY 5 MIN PRN
Status: DISCONTINUED | OUTPATIENT
Start: 2019-03-12 | End: 2019-03-12 | Stop reason: HOSPADM

## 2019-03-12 RX ORDER — LIDOCAINE 40 MG/G
CREAM TOPICAL
Status: DISCONTINUED | OUTPATIENT
Start: 2019-03-12 | End: 2019-03-15 | Stop reason: HOSPADM

## 2019-03-12 RX ORDER — LABETALOL HYDROCHLORIDE 5 MG/ML
10 INJECTION, SOLUTION INTRAVENOUS
Status: DISCONTINUED | OUTPATIENT
Start: 2019-03-12 | End: 2019-03-12 | Stop reason: HOSPADM

## 2019-03-12 RX ORDER — ONDANSETRON 4 MG/1
4 TABLET, ORALLY DISINTEGRATING ORAL EVERY 30 MIN PRN
Status: DISCONTINUED | OUTPATIENT
Start: 2019-03-12 | End: 2019-03-12 | Stop reason: HOSPADM

## 2019-03-12 RX ORDER — ACETAMINOPHEN 325 MG/1
650 TABLET ORAL EVERY 4 HOURS PRN
Status: DISCONTINUED | OUTPATIENT
Start: 2019-03-15 | End: 2019-03-15 | Stop reason: HOSPADM

## 2019-03-12 RX ORDER — BUPIVACAINE HYDROCHLORIDE 2.5 MG/ML
INJECTION, SOLUTION EPIDURAL; INFILTRATION; INTRACAUDAL PRN
Status: DISCONTINUED | OUTPATIENT
Start: 2019-03-12 | End: 2019-03-12 | Stop reason: HOSPADM

## 2019-03-12 RX ORDER — NALOXONE HYDROCHLORIDE 0.4 MG/ML
.1-.4 INJECTION, SOLUTION INTRAMUSCULAR; INTRAVENOUS; SUBCUTANEOUS
Status: DISCONTINUED | OUTPATIENT
Start: 2019-03-12 | End: 2019-03-12

## 2019-03-12 RX ORDER — NICOTINE POLACRILEX 4 MG
15-30 LOZENGE BUCCAL
Status: DISCONTINUED | OUTPATIENT
Start: 2019-03-12 | End: 2019-03-15 | Stop reason: HOSPADM

## 2019-03-12 RX ORDER — DEXTROSE MONOHYDRATE 25 G/50ML
25-50 INJECTION, SOLUTION INTRAVENOUS
Status: DISCONTINUED | OUTPATIENT
Start: 2019-03-12 | End: 2019-03-15 | Stop reason: HOSPADM

## 2019-03-12 RX ORDER — OXYCODONE HYDROCHLORIDE 5 MG/1
5-10 TABLET ORAL EVERY 4 HOURS PRN
Status: DISCONTINUED | OUTPATIENT
Start: 2019-03-12 | End: 2019-03-15 | Stop reason: HOSPADM

## 2019-03-12 RX ORDER — NALOXONE HYDROCHLORIDE 0.4 MG/ML
.1-.4 INJECTION, SOLUTION INTRAMUSCULAR; INTRAVENOUS; SUBCUTANEOUS
Status: ACTIVE | OUTPATIENT
Start: 2019-03-12 | End: 2019-03-13

## 2019-03-12 RX ORDER — POTASSIUM CHLORIDE 750 MG/1
10 TABLET, EXTENDED RELEASE ORAL 2 TIMES DAILY
Status: DISCONTINUED | OUTPATIENT
Start: 2019-03-12 | End: 2019-03-15 | Stop reason: HOSPADM

## 2019-03-12 RX ORDER — ONDANSETRON 2 MG/ML
4 INJECTION INTRAMUSCULAR; INTRAVENOUS EVERY 30 MIN PRN
Status: DISCONTINUED | OUTPATIENT
Start: 2019-03-12 | End: 2019-03-12 | Stop reason: HOSPADM

## 2019-03-12 RX ADMIN — FENTANYL CITRATE 50 MCG: 50 INJECTION, SOLUTION INTRAMUSCULAR; INTRAVENOUS at 16:24

## 2019-03-12 RX ADMIN — MIDAZOLAM 2 MG: 1 INJECTION INTRAMUSCULAR; INTRAVENOUS at 16:21

## 2019-03-12 RX ADMIN — AMPICILLIN SODIUM AND SULBACTAM SODIUM 3 G: 2; 1 INJECTION, POWDER, FOR SOLUTION INTRAMUSCULAR; INTRAVENOUS at 22:14

## 2019-03-12 RX ADMIN — PANTOPRAZOLE SODIUM 40 MG: 40 TABLET, DELAYED RELEASE ORAL at 06:58

## 2019-03-12 RX ADMIN — CARVEDILOL 25 MG: 25 TABLET, FILM COATED ORAL at 18:37

## 2019-03-12 RX ADMIN — PROPOFOL 50 MCG/KG/MIN: 10 INJECTION, EMULSION INTRAVENOUS at 16:28

## 2019-03-12 RX ADMIN — CARVEDILOL 25 MG: 25 TABLET, FILM COATED ORAL at 08:43

## 2019-03-12 RX ADMIN — INSULIN GLARGINE 20 UNITS: 100 INJECTION, SOLUTION SUBCUTANEOUS at 22:22

## 2019-03-12 RX ADMIN — ATORVASTATIN CALCIUM 40 MG: 40 TABLET, FILM COATED ORAL at 20:15

## 2019-03-12 RX ADMIN — ACETAMINOPHEN 975 MG: 325 TABLET, FILM COATED ORAL at 18:38

## 2019-03-12 RX ADMIN — SODIUM CHLORIDE, POTASSIUM CHLORIDE, SODIUM LACTATE AND CALCIUM CHLORIDE: 600; 310; 30; 20 INJECTION, SOLUTION INTRAVENOUS at 16:21

## 2019-03-12 RX ADMIN — INSULIN ASPART 1 UNITS: 100 INJECTION, SOLUTION INTRAVENOUS; SUBCUTANEOUS at 06:56

## 2019-03-12 RX ADMIN — AMPICILLIN SODIUM AND SULBACTAM SODIUM 3 G: 2; 1 INJECTION, POWDER, FOR SOLUTION INTRAMUSCULAR; INTRAVENOUS at 11:48

## 2019-03-12 RX ADMIN — POTASSIUM CHLORIDE 10 MEQ: 750 TABLET, FILM COATED, EXTENDED RELEASE ORAL at 09:28

## 2019-03-12 RX ADMIN — LEVOTHYROXINE SODIUM 137 MCG: 137 TABLET ORAL at 22:14

## 2019-03-12 RX ADMIN — ISOSORBIDE MONONITRATE 30 MG: 30 TABLET, EXTENDED RELEASE ORAL at 08:43

## 2019-03-12 RX ADMIN — INSULIN ASPART 1 UNITS: 100 INJECTION, SOLUTION INTRAVENOUS; SUBCUTANEOUS at 02:18

## 2019-03-12 RX ADMIN — POTASSIUM CHLORIDE 10 MEQ: 750 TABLET, FILM COATED, EXTENDED RELEASE ORAL at 20:15

## 2019-03-12 RX ADMIN — AMPICILLIN SODIUM AND SULBACTAM SODIUM 3 G: 2; 1 INJECTION, POWDER, FOR SOLUTION INTRAMUSCULAR; INTRAVENOUS at 16:50

## 2019-03-12 RX ADMIN — AMPICILLIN SODIUM AND SULBACTAM SODIUM 3 G: 2; 1 INJECTION, POWDER, FOR SOLUTION INTRAMUSCULAR; INTRAVENOUS at 05:22

## 2019-03-12 RX ADMIN — FUROSEMIDE 80 MG: 40 TABLET ORAL at 08:43

## 2019-03-12 ASSESSMENT — ACTIVITIES OF DAILY LIVING (ADL)
ADLS_ACUITY_SCORE: 15

## 2019-03-12 ASSESSMENT — MIFFLIN-ST. JEOR: SCORE: 1968.75

## 2019-03-12 NOTE — PROGRESS NOTES
Pt requests that I order him a glucometer that his insurance covers and that the testing strips are covered. (His previous meter isn't covered under his insurance or his testing strips)  Will order after care rounds.  Denise RICE CTS [id number]

## 2019-03-12 NOTE — ANESTHESIA PREPROCEDURE EVALUATION
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     Cardiomyopathy (H)      Cellulitis of left lower extremity 3/13/2018     Cerebral infarction (H)     eye sight decreased in peripheral of right side and blurry     Diabetic ulcer of left midfoot associated with type 2 diabetes mellitus, with fat layer exposed (H) 3/13/2018     Essential hypertension, benign 2002     Generalized osteoarthrosis, unspecified site 2002     Microalbuminuria 3/13/2018    X1     Myocardial infarction (H)      Neuropathy      Sepsis (H)      Sleep apnea     doesn't use it     Substance abuse (H)      Syncope, unspecified syncope type 3/13/2018     Thyroid disease     takes medicine     Tobacco use disorder 2002     Type 2 diabetes mellitus with diabetic peripheral angiopathy without gangrene, unspecified long term insulin use status      Past Surgical History:   Procedure Laterality Date     AMPUTATE TOE(S) Right 2019    Procedure: Right open second toe partial amputation;  Surgeon: Sabino Garcia DPM;  Location: [facility] OR     AMPUTATE TOE(S) Right 2019    Procedure: 1.  Revision right second toe amputation with resection of distal half of proximal phalanx with full closure.    2.  Debridement of callus/preulcerative lesion, distal left second toe and plantar left first metatarsophalangeal joint.;  Surgeon: Ryan Bhagat DPM;  Location: [facility] OR     ANGIOGRAM  05    subtotal occ.mid LAD,SUSAN mid LAD     ANGIOGRAM      mild CAD,patent stent,no flow-limiting lesions,sev.LV dysf.LAD enlarged     ANGIOGRAM      Sev.single vessel  disease,Mod LV dysf.distal LAD 90%,70-75% mid lad just before prev stent,SUSAN to prox.mid LAD, endeavor to distal LAD     ANGIOGRAM  11/13/13    restenosis, stent LAD     BACK SURGERY      back surgery x3     C NONSPECIFIC PROCEDURE      Laminectomy x 3 - (1983 x 2 & 1990)     C NONSPECIFIC PROCEDURE Bilateral 1998    Bunionectomy     C NONSPECIFIC PROCEDURE  1959    Gingival surgery at age 9     HEART CATH LEFT HEART CATH  06/13/2017    SUSAN to OM3     INCISION AND DRAINAGE TOE, COMBINED Bilateral 9/11/2018    Procedure: COMBINED INCISION AND DRAINAGE TOE;  1) Irrigation and debridement ulcer left great toe  2) Amputation 3rd toe right foot at distal interphalangeal joint level;  Surgeon: Miki Harp MD;  Location: [facility] OR     ORTHOPEDIC SURGERY      bunion surgery both feet     ORTHOPEDIC SURGERY      left shoulder     SOFT TISSUE SURGERY      debridement of toe numerous time     VASCULAR SURGERY      7 stents in heart     Anesthesia Evaluation     . Pt has had prior anesthetic. Type: General    No history of anesthetic complications          ROS/MED HX    ENT/Pulmonary:     (+)sleep apnea, doesn't use CPAP , . .    Neurologic:     (+)TIA     Cardiovascular:     (+) hypertension--CAD, angina--stent,. : . CHF . . :. .       METS/Exercise Tolerance:     Hematologic:  - neg hematologic  ROS       Musculoskeletal:   (+) , , other musculoskeletal-       GI/Hepatic:  - neg GI/hepatic ROS       Renal/Genitourinary:  - ROS Renal section negative       Endo:     (+) type II DM Using insulin Diabetic complications: nephropathy neuropathy retinopathy, thyroid problem .      Psychiatric:         Infectious Disease:   (+) Other Infectious Disease       Malignancy:         Other:    - neg other ROS                      Physical Exam  Normal systems: cardiovascular and pulmonary    Airway   Mallampati: III  TM distance: >3 FB  Neck ROM: full    Dental   (+) missing    Cardiovascular       Pulmonary             Lab  "Results   Component Value Date    WBC 5.7 03/12/2019    HGB 10.4 (L) 03/12/2019    HCT 31.1 (L) 03/12/2019     03/12/2019    CRP 81.6 (H) 09/08/2018    SED 25 (H) 03/10/2019     03/12/2019    POTASSIUM 3.5 03/12/2019    CHLORIDE 102 03/12/2019    CO2 29 03/12/2019    BUN 21 03/12/2019    CR 1.29 (H) 03/12/2019     (H) 03/12/2019    BETTIE 8.6 03/12/2019    MAG 2.4 (H) 01/25/2019    ALBUMIN 3.3 (L) 01/17/2019    PROTTOTAL 7.5 01/17/2019    ALT 17 01/17/2019    AST 10 01/17/2019    ALKPHOS 77 01/17/2019    BILITOTAL 1.2 01/17/2019    LIPASE 23 11/02/2010    PTT 31 06/09/2017    INR 1.43 (H) 01/20/2019    TSH 0.40 09/06/2018       Preop Vitals  BP Readings from Last 3 Encounters:   03/12/19 (!) 137/97   03/07/19 106/68   02/14/19 (!) 86/58    Pulse Readings from Last 3 Encounters:   03/12/19 76   03/07/19 80   02/14/19 72      Resp Readings from Last 3 Encounters:   03/12/19 16   01/25/19 18   01/14/19 18    SpO2 Readings from Last 3 Encounters:   03/12/19 99%   03/07/19 99%   02/14/19 97%      Temp Readings from Last 1 Encounters:   03/12/19 98  F (36.7  C) (Temporal)    Ht Readings from Last 1 Encounters:   03/10/19 1.93 m (6' 4\")      Wt Readings from Last 1 Encounters:   03/12/19 109.7 kg (241 lb 14.4 oz)    Estimated body mass index is 29.44 kg/m  as calculated from the following:    Height as of this encounter: 1.93 m (6' 4\").    Weight as of this encounter: 109.7 kg (241 lb 14.4 oz).       Anesthesia Plan      History & Physical Review  History and physical reviewed and following examination; no interval change.    ASA Status:  3 .    NPO Status:  > 8 hours    Plan for MAC with Intravenous induction. Maintenance will be Balanced.    PONV prophylaxis:  Ondansetron (or other 5HT-3) and Dexamethasone or Solumedrol       Postoperative Care  Postoperative pain management:  IV analgesics, Oral pain medications and Multi-modal analgesia.      Consents  Anesthetic plan, risks, benefits and alternatives " discussed with:  Patient..                 Sabino Hanna MD                    .

## 2019-03-12 NOTE — ANESTHESIA POSTPROCEDURE EVALUATION
Patient: Jayro Elder    Procedure(s):  4TH RIGHT TOE AMPUTATION  COMBINED IRRIGATION AND DEBRIDEMENT LEFT FOOT ULCER    Diagnosis:Diabetes  Diagnosis Additional Information: Pre-operative diagnosis:         Diabetes  Post-operative diagnosis        sp partial R 4th toe amputation and left foot wound debridement  Procedure:      Procedure(s):  4TH RIGHT TOE AMPUTATION  COMBINED IRRIGATION AND DEBRIDEMENT LEFT FOOT ULCER  ,       Anesthesia Type:  MAC    Note:  Anesthesia Post Evaluation    Patient location during evaluation: Phase 2  Patient participation: Able to fully participate in evaluation  Level of consciousness: awake and alert  Pain management: adequate  Airway patency: patent  Cardiovascular status: acceptable  Respiratory status: acceptable  Hydration status: acceptable  PONV: none     Anesthetic complications: None          Last vitals:  Vitals:    03/12/19 1552 03/12/19 1600 03/12/19 1700   BP: (!) 125/105 (!) 137/97 120/84   Pulse:      Resp: 16  12   Temp: 98  F (36.7  C)  97.7  F (36.5  C)   SpO2: 99%  98%         Electronically Signed By: Sabino Hanna MD  March 12, 2019  5:13 PM

## 2019-03-12 NOTE — PROGRESS NOTES
[facility]  Hospitalist Progress Note  Name: Jayro Elder    MRN: [id number]  Physician:  Anthony Baker DO, FHM (Text Page)    Summary of Stay:  Jayro Elder is a 68 year old male admitted on 3/10/2019 with foot pain swelling and redness right foot.  Patient has history of type 2 diabetes, coronary artery disease with multiple stents, ischemic cardiomyopathy and chronic kidney disease.  Recently hospitalized in Jan with foot infection and CHF exacerbation + ABNER.  Patient was admitted and was started on IV antibiotic, infectious disease physician was consulted as well as podiatry service.  Amputation is planned on 3/12/19.    Assessment & Plan    Diabetic foot infection with acute cellulitis and osteomyelitis involving right foot/fourth toe necrotic ulcer:  -  Continue Unasyn IV.  Patient had an early breakfast and is now NPO for surgery this evening.  Appreciate ID and Podiatry service assistance.    DM Type II:  -  At times somewhat uncontrolled.  Diet variable here with his NPO status.  He is on NPO sliding scale insulin q4 right now but had breakfast not long ago and is spiking over 200 now.  He tells me he normally would take 5-6 units for coverage with the meal he just ate.  I will give 4 units novolog now one time to cover for the meal.   As the day progresses, he is NPO and will be just on NPO scale with q4 hour checks.  After surgery, he can move back to meal sliding scale insulin and as he uses his insulin more as carb coverage at home I would start low dose carb coverage with meals tomorrow.  Continue reduced dose lantus tonight as I am not certain how he will be eating until tomorrow given the late scheduled surgery.  -  It should also be noted he had a lot of adjustment up and down on insulin the past several weeks.  Much of this was attributed to his infection issues and Acute Renal Failure.    CKD Stage II with recent ABNER: Stable, avoid renal toxic meds.  ACEI has been on hold.   Consider resuming post-op.    CAD with prior stents (none in past year), no acute cardiac complaints this admission  Ischemic cardiomyopathy with EF 35% range, Had CHF exac in January  Hyperlipidemia:   - No recent chest pain or SOB.  He feels his cardiac issues have been stable.  Continue coreg 25 mg BID + statin + lasix + imdur.  Appears euvolemic at the moment.  -  Resume daily potassium as he is on daily lasix and has borderline low normal Potassium  -  Holding plavix for surgery, resume post-op when ok with Podiatry (likely tomorrow)    Parox Atrial Fibrillation:  -  Apixaban (formerly on plavix + warfarin but changed in Jan) on hold for surgery, resume once acceptable with Podiatry post-op  -  Continue Coreg    Hypertension: Borderline high, continue coreg + lasix.  Resume low dose amlodipine if continues remaining high post-op.    Hypothyroidism: Continue levothyroxine    Reflux:  Continue PPI    MARISOL:  Patient not on basline CPAP    Prior CVA, felt embolic:  -  Resume apixaban tomorrow or the day after as Podiatry feels safe from a bleeding perspective       Diet: NPO per Anesthesia Guidelines for Procedure/Surgery Except for: Meds    DVT Prophylaxis: Pneumatic Compression Devices (apixaban to resume soon)  Taylor Catheter: not present  Code Status: Full Code      Disposition Plan   Expected discharge in a couple days to prior living arrangement once infection treated/surgery completed.     Entered: Anthony Baker 03/12/2019, 8:40 AM       Interval History   Assumed care, history reviewed.  No new complaints.  Denies chest pain or SOB.  Understands plan for surgery later today.  Only request was for a shower today and some extra insulin now as he ate an early breakfast and didn't get his usual carb coverage (takes 5-6 units with most meals + sliding scale insulin).    -Data reviewed today: I reviewed all new labs and imaging reports over the last 24 hours. I personally reviewed no images or EKG's  today.    Physical Exam   Temp: 96.5  F (35.8  C) Temp src: Oral BP: 145/89   Heart Rate: 83 Resp: 16 SpO2: 97 % O2 Device: None (Room air)    Vitals:    03/10/19 2208 03/12/19 0054   Weight: 109.3 kg (241 lb) 109.7 kg (241 lb 14.4 oz)     Vital Signs with Ranges  Temp:  [96  F (35.6  C)-97.8  F (36.6  C)] 96.5  F (35.8  C)  Heart Rate:  [69-83] 83  Resp:  [16-18] 16  BP: (127-145)/(79-89) 145/89  SpO2:  [97 %-98 %] 97 %  I/O last 3 completed shifts:  In: 240 [P.O.:240]  Out: -     GEN:  Alert, oriented x 3, appears comfortable, no overt distress.  HEENT:  Normocephalic/atraumatic, no scleral icterus, no nasal discharge, mouth moist.  CV:  Regular rate and rhythm, distant.  No loud murmur or rub.  LUNGS:  Clear to auscultation bilaterally without rales/rhonchi/wheezing/retractions.  Symmetric chest rise on inhalation noted.  ABD:  Active bowel sounds, soft, non-tender/non-distended.  No rebound/guarding/rigidity.  EXT:  Trace LE edema.  No cyanosis.  Right foot infection wound dressed (detailed exam deferred to podiatry).  No acute joint synovitis otherwise noted.  SKIN:  Dry to touch, no exanthems noted in the visualized areas.    Medications     - MEDICATION INSTRUCTIONS -         ampicillin-sulbactam (UNASYN) IV  3 g Intravenous Q6H     atorvastatin  40 mg Oral QPM     carvedilol  25 mg Oral BID w/meals     furosemide  80 mg Oral Daily     influenza Vac Split High-Dose  0.5 mL Intramuscular Prior to discharge     insulin aspart  4 Units Subcutaneous Once     insulin aspart  1-4 Units Subcutaneous Q4H     insulin glargine  20 Units Subcutaneous At Bedtime     isosorbide mononitrate  30 mg Oral Daily     levothyroxine  137 mcg Oral At Bedtime     pantoprazole  40 mg Oral QAM AC     Data     Recent Labs   Lab 03/12/19  0617 03/11/19  0653 03/10/19  2054   WBC 5.7 7.3 9.0   HGB 10.4* 10.4* 11.3*   HCT 31.1* 31.2* 33.6*   MCV 86 86 86    190 211     Recent Labs   Lab 03/12/19  0617 03/11/19  1601 03/11/19  0653  03/10/19  2054     --  138 135   POTASSIUM 3.5 4.1 3.2* 3.7   CHLORIDE 102  --  104 99   CO2 29  --  30 30   ANIONGAP 7  --  4 6   *  --  121* 300*   BUN 21 -- 19 19   CR 1.29*  --  1.33* 1.58*   GFRESTIMATED 56*  --  54* 44*   GFRESTBLACK 65  --  63 51*   BETTIE 8.6  --  8.3* 8.5     Recent Labs   Lab 03/12/19  0655 03/12/19  0617 03/12/19  0204 03/11/19  2226 03/11/19 1814 03/11/19  1250  03/11/19  0653  03/10/19  2054 03/07/19  0743   GLC  --  181*  --   --   --   --   --  121*  --  300* 202*   *  --  207* 298* 238* 189*   < >  --    < >  --   --     < > = values in this interval not displayed.         Recent Results (from the past 24 hour(s))   MR Foot Right w/o & w Contrast    Narrative    MR FOOT RIGHT WITHOUT AND WITH CONTRAST 3/11/2019 9:37 AM    HISTORY: Fourth right toe blister in diabetic patient. Evaluate for  osteomyelitis.    TECHNIQUE: Multisequence multiplanar MRI without and with IV contrast.  11 mL Gadavist given intravenously.    COMPARISON: 1/6/2019.    FINDINGS: There is abnormal bone marrow edema without significant  contrast enhancement involving the distal phalanx of the fourth toe,  corresponding to the area of current clinical concern (image 13 of  series 4). This is a new finding since the prior exam and is not  entirely specific since there is no definite cortical effacement. This  could be reactive bone marrow edema but early osteomyelitis is  suspected.    The prior exam showed amputation of the distal second toe at the  proximal interphalangeal joint. The current study shows interval  further amputation of the second toe to the level of the mid proximal  phalanx. There is some minimal bone marrow edema associated with  remaining second proximal phalanx, likely postoperative change. Soft  tissues in this region show no acute abnormality. Again seen is prior  amputation of the distal third toe at the distal interphalangeal joint  with no acute-appearing abnormality in  this region.    No other potentially acute-appearing bony abnormalities. Hallux valgus  metatarsus varus deformity and advanced osteoarthritis at the first  metatarsophalangeal joint again noted. Degenerative changes at  tarsometatarsal joints are also seen, especially the second  tarsometatarsal joint.    Soft tissue swelling and edema about the fourth toe. No ulcer is seen  and there is no evidence for soft tissue abscess. There is also  moderate nonspecific subcutaneous edema over the dorsum of most of the  forefoot.      Impression    IMPRESSION:  1. Bone marrow edema without definite cortical effacement involving  the distal phalanx of the fourth toe associated with surrounding soft  tissue edema and swelling. The findings are not entirely specific, but  early osteomyelitis in the distal phalanx is suspected.  2. Interval revision of second toe amputation which is now at the mid  proximal phalanx level.  3. No other changes since the prior exam.    JOHN VELAZCO MD

## 2019-03-12 NOTE — PLAN OF CARE
RN [id number]    VS: Temp: 97.8  F (36.6  C) Temp src: Oral BP: 127/79 Pulse: 92 Heart Rate: 81 Resp: 16 SpO2: 98 % O2 Device: None (Room air), rating pain as a 4 in Bilateral feet, PRN Tylenol given x1   Cardio: A-fib, controlled   Neuro: A&O x4, forgetful, CMS intact ex numbness and tingling in BLE  Resp: RA, denies SOB, LS diminished/clear  GI/: LBM 3/10/19, passing flatus, BS +; Voiding w/o difficulty, good UOP   Skin: RLE cellulitis marked, appears to be receding, foot ZINA d/t dressing, LLE scab, intacct   Activity: SBA   Diet: Reg  IVs/lines: SL, scheduled abx   Plan: Surg tomorrow, NPO in AM, continue plan of care

## 2019-03-12 NOTE — PROGRESS NOTES
"[facility]  Infectious Disease Progress Note          Assessment and Plan:   IMPRESSION:   1.  A 68-year-old male, well known to me, admitted with new right fourth toe infection with cellulitis into the foot and to the leg.  No major systemic toxicity.   2.  Prior history of multiple recurrent foot infections, both left and right, most recently right foot amputations.   3.  Diabetes mellitus with neuropathy.   4.  Ischemic cardiomyopathy.      RECOMMENDATIONS:   1. Continue Unasyn, covers the prior microbiology, await current cultures and operative findings, and readjust.   2.  If all major infection resected probably oral therapy will be acceptable. L plantar ulcer debridement also            Interval History:   no new complaints and doing well; no cp, sob, n/v/d, or abd pain. No fever feels OK              Medications:       ampicillin-sulbactam (UNASYN) IV  3 g Intravenous Q6H     atorvastatin  40 mg Oral QPM     carvedilol  25 mg Oral BID w/meals     furosemide  80 mg Oral Daily     influenza Vac Split High-Dose  0.5 mL Intramuscular Prior to discharge     insulin aspart  1-4 Units Subcutaneous Q4H     insulin glargine  20 Units Subcutaneous At Bedtime     isosorbide mononitrate  30 mg Oral Daily     levothyroxine  137 mcg Oral At Bedtime     pantoprazole  40 mg Oral QAM AC     potassium chloride ER  10 mEq Oral BID                  Physical Exam:   Blood pressure 146/80, pulse 76, temperature 96.5  F (35.8  C), temperature source Oral, resp. rate 16, height 1.93 m (6' 4\"), weight 109.7 kg (241 lb 14.4 oz), SpO2 97 %.  Wt Readings from Last 2 Encounters:   03/12/19 109.7 kg (241 lb 14.4 oz)   03/07/19 111.9 kg (246 lb 9.6 oz)     Vital Signs with Ranges  Temp:  [96  F (35.6  C)-97.8  F (36.6  C)] 96.5  F (35.8  C)  Pulse:  [76] 76  Heart Rate:  [69-83] 83  Resp:  [16-18] 16  BP: (127-146)/(79-89) 146/80  SpO2:  [97 %-98 %] 97 %    Constitutional: Awake, alert, cooperative, no apparent " distress   Lungs: Clear to auscultation bilaterally, no crackles or wheezing   Cardiovascular: Regular rate and rhythm, normal S1 and S2, and no murmur noted   Abdomen: Normal bowel sounds, soft, non-distended, non-tender   Skin: No rashes, no cyanosis, sl edema 4th toe same red less foot   Other:           Data:   All microbiology laboratory data reviewed.  Recent Labs   Lab Test 03/12/19 0617 03/11/19 0653 03/10/19  2054   WBC 5.7 7.3 9.0   HGB 10.4* 10.4* 11.3*   HCT 31.1* 31.2* 33.6*   MCV 86 86 86    190 211     Recent Labs   Lab Test 03/12/19 0617 03/11/19 0653 03/10/19  2054   CR 1.29* 1.33* 1.58*     Recent Labs   Lab Test 03/10/19  2054   SED 25*     Recent Labs   Lab Test 01/17/19  1903 01/06/19  1436 01/05/19  1708 01/05/19  1558 09/09/18  1533 09/08/18  0613 09/06/18  1550 09/06/18  1545 09/06/18  1541   CULT No growth Light growth  Peptoniphilus asaccharolyticus  Susceptibility testing not routinely done  *  Light growth  Clostridium species, not perfringens  Susceptibility testing not routinely done  *  Heavy growth  Finegoldia magna (Peptostreptococcus jennifer)  Susceptibility testing not routinely done  *  Heavy growth  Streptococcus dysgalactiae serogroup C/G  *  Moderate growth  Staphylococcus lugdunensis  *  Light growth  Streptococcus dysgalactiae serogroup C/G  Susceptibility testing done on previous specimen  *  Single colony  Coagulase negative Staphylococcus  Susceptibility testing not routinely done  These bacteria are part of normal skin michael, but on occasion, may be true pathogens.    Clinical correlation must be applied to interpreting this microbiology result.  * No growth No growth No growth No growth Cultured on the 1st day of incubation:  Streptococcus agalactiae sero group B  *  Critical Value/Significant Value, preliminary result only, called to and read back by  Teresa John RN on RHMS5 @1052 on 9/7/18. .    (Note)  POSITIVE for STREPTOCOCCUS AGALACTIAE  (Group B Strep) by Gist  multiplex nucleic acid test. Penicillin and ampicillin are drugs of  choice, nonsusceptible isolates have not been reported. Final  identification and antimicrobial susceptibility testing will be  verified by standard methods.    Specimen tested with Verigene multiplex, gram-positive blood culture  nucleic acid test for the following targets: Staph aureus, Staph  epidermidis, Staph lugdunensis, other Staph species, Enterococcus  faecalis, Enterococcus faecium, Streptococcus species, S. agalactiae,  S. anginosus grp., S. pneumoniae, S. pyogenes, Listeria sp., mecA  (methicillin resistance) and Elisha/B (vancomycin resistance).    Critical Value/Significant Value called to and read back by Teresa John RN on 9.7.18 at 1340. bw     Cultured on the 1st day of incubation:  Streptococcus agalactiae sero group B  Susceptibility testing done on previous specimen  *  Critical Value/Significant Value, preliminary result only, called to and read back by  Ofe Fernandes RN on RHMS5 @1515 on 9/7/18. ch.   Heavy growth  Staphylococcus aureus  *  Heavy growth  Beta hemolytic Streptococcus group B  *  Moderate growth  Normal skin michael    No growth

## 2019-03-12 NOTE — ANESTHESIA CARE TRANSFER NOTE
Patient: Jayro Elder    Procedure(s):  4TH RIGHT TOE AMPUTATION  COMBINED IRRIGATION AND DEBRIDEMENT LEFT FOOT ULCER    Diagnosis: Diabetes  Diagnosis Additional Information: No value filed.    Anesthesia Type:   No value filed.     Note:    Patient transferred to:PACU  Comments: Pt tolerated sedation to PACU VSS Report to RNHandoff Report: Identifed the Patient, Identified the Reponsible Provider, Reviewed the pertinent medical history, Discussed the surgical course, Reviewed Intra-OP anesthesia mangement and issues during anesthesia, Set expectations for post-procedure period and Allowed opportunity for questions and acknowledgement of understanding      Vitals: (Last set prior to Anesthesia Care Transfer)    CRNA VITALS  3/12/2019 1629 - 3/12/2019 1703      3/12/2019             Pulse:  72    SpO2:  98 %                Electronically Signed By: LORRAINE Veronica CRNA  March 12, 2019  5:03 PM

## 2019-03-12 NOTE — PLAN OF CARE
A/O x 4. Up with SBA, but refusing to have anyone with him while in bathroom. Explained of risks, still chooses to be alone once in bathroom.  IV unasyn.   Blood Sugars q 4 with coverage.   Right foot wrapped in dry gauze.   Minimal pain, refused intervention.

## 2019-03-12 NOTE — BRIEF OP NOTE
[facility]    Brief Operative Note    Pre-operative diagnosis: Diabetes  Post-operative diagnosis sp partial R 4th toe amputation and left foot wound debridement  Procedure: Procedure(s):  4TH RIGHT TOE AMPUTATION  COMBINED IRRIGATION AND DEBRIDEMENT LEFT FOOT ULCER  Surgeon: Surgeon(s) and Role:     * Ryan Bhagat DPM - Primary  Anesthesia: General   Estimated blood loss: Less than 10 ml  Drains: None  Specimens:   ID Type Source Tests Collected by Time Destination   1 : Tissue right 4th toe Tissue Toe ANAEROBIC BACTERIAL CULTURE, TISSUE CULTURE AEROBIC BACTERIAL Ryan Bhagat DPM 3/12/2019  4:42 PM      Findings:   healthy tissue R 4th toe at amp site.  Complications: None.  Implants: None.    All bone infection resected R 4th toe; full closure,n o further surgery planned.  Anticipate 1-2 days for IV abx and ok for discharge on PO course

## 2019-03-13 ENCOUNTER — APPOINTMENT (OUTPATIENT)
Dept: PHYSICAL THERAPY | Facility: CLINIC | Age: 69
DRG: 617 | End: 2019-03-13
Attending: PODIATRIST
Payer: COMMERCIAL

## 2019-03-13 LAB
ANION GAP SERPL CALCULATED.3IONS-SCNC: 6 MMOL/L (ref 3–14)
BUN SERPL-MCNC: 21 MG/DL (ref 7–30)
CALCIUM SERPL-MCNC: 8.7 MG/DL (ref 8.5–10.1)
CHLORIDE SERPL-SCNC: 102 MMOL/L (ref 94–109)
CO2 SERPL-SCNC: 29 MMOL/L (ref 20–32)
CREAT SERPL-MCNC: 1.37 MG/DL (ref 0.66–1.25)
GFR SERPL CREATININE-BSD FRML MDRD: 52 ML/MIN/{1.73_M2}
GLUCOSE BLDC GLUCOMTR-MCNC: 173 MG/DL (ref 70–99)
GLUCOSE BLDC GLUCOMTR-MCNC: 199 MG/DL (ref 70–99)
GLUCOSE BLDC GLUCOMTR-MCNC: 236 MG/DL (ref 70–99)
GLUCOSE BLDC GLUCOMTR-MCNC: 238 MG/DL (ref 70–99)
GLUCOSE BLDC GLUCOMTR-MCNC: 245 MG/DL (ref 70–99)
GLUCOSE BLDC GLUCOMTR-MCNC: 303 MG/DL (ref 70–99)
GLUCOSE SERPL-MCNC: 243 MG/DL (ref 70–99)
POTASSIUM SERPL-SCNC: 3.7 MMOL/L (ref 3.4–5.3)
SODIUM SERPL-SCNC: 137 MMOL/L (ref 133–144)

## 2019-03-13 PROCEDURE — 25000132 ZZH RX MED GY IP 250 OP 250 PS 637: Performed by: STUDENT IN AN ORGANIZED HEALTH CARE EDUCATION/TRAINING PROGRAM

## 2019-03-13 PROCEDURE — 40000901 ZZH STATISTIC WOC PT EDUCATION, 0-15 MIN

## 2019-03-13 PROCEDURE — 25000132 ZZH RX MED GY IP 250 OP 250 PS 637: Performed by: PODIATRIST

## 2019-03-13 PROCEDURE — 25000131 ZZH RX MED GY IP 250 OP 636 PS 637: Performed by: STUDENT IN AN ORGANIZED HEALTH CARE EDUCATION/TRAINING PROGRAM

## 2019-03-13 PROCEDURE — 25000128 H RX IP 250 OP 636: Performed by: INTERNAL MEDICINE

## 2019-03-13 PROCEDURE — 12000000 ZZH R&B MED SURG/OB

## 2019-03-13 PROCEDURE — 97161 PT EVAL LOW COMPLEX 20 MIN: CPT | Mod: GP

## 2019-03-13 PROCEDURE — 00000146 ZZHCL STATISTIC GLUCOSE BY METER IP

## 2019-03-13 PROCEDURE — 99233 SBSQ HOSP IP/OBS HIGH 50: CPT | Performed by: INTERNAL MEDICINE

## 2019-03-13 PROCEDURE — 80048 BASIC METABOLIC PNL TOTAL CA: CPT | Performed by: PODIATRIST

## 2019-03-13 PROCEDURE — 25000132 ZZH RX MED GY IP 250 OP 250 PS 637: Performed by: INTERNAL MEDICINE

## 2019-03-13 PROCEDURE — 36415 COLL VENOUS BLD VENIPUNCTURE: CPT | Performed by: PODIATRIST

## 2019-03-13 RX ADMIN — LEVOTHYROXINE SODIUM 137 MCG: 137 TABLET ORAL at 21:24

## 2019-03-13 RX ADMIN — POTASSIUM CHLORIDE 10 MEQ: 750 TABLET, FILM COATED, EXTENDED RELEASE ORAL at 21:24

## 2019-03-13 RX ADMIN — FUROSEMIDE 80 MG: 40 TABLET ORAL at 08:57

## 2019-03-13 RX ADMIN — AMPICILLIN SODIUM AND SULBACTAM SODIUM 3 G: 2; 1 INJECTION, POWDER, FOR SOLUTION INTRAMUSCULAR; INTRAVENOUS at 05:58

## 2019-03-13 RX ADMIN — Medication 1 MG: at 01:46

## 2019-03-13 RX ADMIN — AMPICILLIN SODIUM AND SULBACTAM SODIUM 3 G: 2; 1 INJECTION, POWDER, FOR SOLUTION INTRAMUSCULAR; INTRAVENOUS at 22:49

## 2019-03-13 RX ADMIN — AMPICILLIN SODIUM AND SULBACTAM SODIUM 3 G: 2; 1 INJECTION, POWDER, FOR SOLUTION INTRAMUSCULAR; INTRAVENOUS at 16:57

## 2019-03-13 RX ADMIN — APIXABAN 5 MG: 5 TABLET, FILM COATED ORAL at 21:24

## 2019-03-13 RX ADMIN — CARVEDILOL 25 MG: 25 TABLET, FILM COATED ORAL at 08:57

## 2019-03-13 RX ADMIN — CARVEDILOL 25 MG: 25 TABLET, FILM COATED ORAL at 18:33

## 2019-03-13 RX ADMIN — PANTOPRAZOLE SODIUM 40 MG: 40 TABLET, DELAYED RELEASE ORAL at 08:58

## 2019-03-13 RX ADMIN — ACETAMINOPHEN 975 MG: 325 TABLET, FILM COATED ORAL at 18:33

## 2019-03-13 RX ADMIN — INSULIN GLARGINE 20 UNITS: 100 INJECTION, SOLUTION SUBCUTANEOUS at 22:51

## 2019-03-13 RX ADMIN — ACETAMINOPHEN 975 MG: 325 TABLET, FILM COATED ORAL at 01:23

## 2019-03-13 RX ADMIN — ISOSORBIDE MONONITRATE 30 MG: 30 TABLET, EXTENDED RELEASE ORAL at 08:57

## 2019-03-13 RX ADMIN — ATORVASTATIN CALCIUM 40 MG: 40 TABLET, FILM COATED ORAL at 21:24

## 2019-03-13 RX ADMIN — POTASSIUM CHLORIDE 10 MEQ: 750 TABLET, FILM COATED, EXTENDED RELEASE ORAL at 08:57

## 2019-03-13 RX ADMIN — ACETAMINOPHEN 975 MG: 325 TABLET, FILM COATED ORAL at 10:20

## 2019-03-13 ASSESSMENT — ACTIVITIES OF DAILY LIVING (ADL)
ADLS_ACUITY_SCORE: 15
ADLS_ACUITY_SCORE: 16
ADLS_ACUITY_SCORE: 15
ADLS_ACUITY_SCORE: 15
ADLS_ACUITY_SCORE: 16
ADLS_ACUITY_SCORE: 15

## 2019-03-13 ASSESSMENT — MIFFLIN-ST. JEOR: SCORE: 1961.49

## 2019-03-13 NOTE — PROGRESS NOTES
[facility]    No complaints.  Previous pain in the right lower extremity has lessened.     Exam:  B/P: 138/81, T: 95.4, P: 79, R: 20  Right Foot:    Incision is coapted  No dehiscence  Slight duskiness along incision  Persisting erythema of 4th toe and must proximal to it.     Left Foot:   2.0cm x 2.5cm x 0.2cm deep ulceration plantar medial first metatarsal head.   No erythema, purulence, malodor    Assessment/ Plan:  68-year old male with poorly controlled type 2 DM status post partial right 4th toe amputation for treatment of osteomyelitis and status post excisional debridement, left foot ulcer 3/12/19; POD #1.  Stable    Continue IV abx for another day, as there is some dark, persisting erythema, right foot.  Significant recession from previous ink markings on leg.    Sterile dressing change was done.    WOC RN consultation for left foot wound care recs.    IP Orthosis Dept for offloading shoe, left foot:  DH2 shoe    Abx per ID:  Unasyn;  Await culture results; currently growing staph aureus    Possible discharge to home in next 24-48 hours. Will place foot-relevant discharge orders  Dr. aGrcia to follow up tomorrow.     Kwasi Root DPM, FACFAS, MS    [facility]

## 2019-03-13 NOTE — PROGRESS NOTES
"[facility]  Infectious Disease Progress Note          Assessment and Plan:   IMPRESSION:   1.  A 68-year-old male, well known to me, admitted with new right fourth toe infection with cellulitis into the foot and to the leg.  No major systemic toxicity.   2.  Prior history of multiple recurrent foot infections, both left and right, most recently right foot amputations.   3.  Diabetes mellitus with neuropathy.   4.  Ischemic cardiomyopathy.      RECOMMENDATIONS:   1. Continue Unasyn, covers the prior microbiology, await current cultures and operative findings, and readjust.   2. Likely all major infection resected probably oral therapy will be acceptable. L plantar ulcer debridement also  Await cxs, I would keep IV today, difficult access but likely po soon so no midline            Interval History:   no new complaints and doing well; no cp, sob, n/v/d, or abd pain. No fever feels OK op noted cxs pending              Medications:       acetaminophen  975 mg Oral Q8H     ampicillin-sulbactam (UNASYN) IV  3 g Intravenous Q6H     apixaban ANTICOAGULANT  5 mg Oral BID     atorvastatin  40 mg Oral QPM     carvedilol  25 mg Oral BID w/meals     furosemide  80 mg Oral Daily     influenza Vac Split High-Dose  0.5 mL Intramuscular Prior to discharge     insulin aspart   Subcutaneous TID AC     insulin aspart  1-10 Units Subcutaneous TID AC     insulin aspart  1-7 Units Subcutaneous At Bedtime     insulin glargine  20 Units Subcutaneous At Bedtime     isosorbide mononitrate  30 mg Oral Daily     levothyroxine  137 mcg Oral At Bedtime     pantoprazole  40 mg Oral QAM AC     potassium chloride ER  10 mEq Oral BID     sodium chloride (PF)  3 mL Intracatheter Q8H                  Physical Exam:   Blood pressure 138/81, pulse 79, temperature 95.4  F (35.2  C), temperature source Oral, resp. rate 20, height 1.93 m (6' 4\"), weight 109 kg (240 lb 4.8 oz), SpO2 97 %.  Wt Readings from Last 2 Encounters: "   03/13/19 109 kg (240 lb 4.8 oz)   03/07/19 111.9 kg (246 lb 9.6 oz)     Vital Signs with Ranges  Temp:  [95.4  F (35.2  C)-98  F (36.7  C)] 95.4  F (35.2  C)  Pulse:  [] 79  Heart Rate:  [73-74] 74  Resp:  [12-20] 20  BP: (120-147)/() 138/81  SpO2:  [97 %-99 %] 97 %    Constitutional: Awake, alert, cooperative, no apparent distress   Lungs: Clear to auscultation bilaterally, no crackles or wheezing   Cardiovascular: Regular rate and rhythm, normal S1 and S2, and no murmur noted   Abdomen: Normal bowel sounds, soft, non-distended, non-tender   Skin: No rashes, no cyanosis, sl edema 4th toe same red less foot   Other:           Data:   All microbiology laboratory data reviewed.  Recent Labs   Lab Test 03/12/19  0617 03/11/19  0653 03/10/19  2054   WBC 5.7 7.3 9.0   HGB 10.4* 10.4* 11.3*   HCT 31.1* 31.2* 33.6*   MCV 86 86 86    190 211     Recent Labs   Lab Test 03/13/19  0621 03/12/19  0617 03/11/19  0653   CR 1.37* 1.29* 1.33*     Recent Labs   Lab Test 03/10/19  2054   SED 25*     Recent Labs   Lab Test 03/12/19  1642 01/17/19  1903 01/06/19  1436 01/05/19  1708 01/05/19  1558 09/09/18  1533 09/08/18  0613 09/06/18  1550 09/06/18  1545   CULT Culture in progress  Culture negative monitoring continues No growth Light growth  Peptoniphilus asaccharolyticus  Susceptibility testing not routinely done  *  Light growth  Clostridium species, not perfringens  Susceptibility testing not routinely done  *  Heavy growth  Finegoldia magna (Peptostreptococcus jennifer)  Susceptibility testing not routinely done  *  Heavy growth  Streptococcus dysgalactiae serogroup C/G  *  Moderate growth  Staphylococcus lugdunensis  *  Light growth  Streptococcus dysgalactiae serogroup C/G  Susceptibility testing done on previous specimen  *  Single colony  Coagulase negative Staphylococcus  Susceptibility testing not routinely done  These bacteria are part of normal skin michael, but on occasion, may be true pathogens.     Clinical correlation must be applied to interpreting this microbiology result.  * No growth No growth No growth No growth Cultured on the 1st day of incubation:  Streptococcus agalactiae sero group B  *  Critical Value/Significant Value, preliminary result only, called to and read back by  Teresa John RN on [email or web address] @1055 on 9/7/18. .    (Note)  POSITIVE for STREPTOCOCCUS AGALACTIAE (Group B Strep) by clickTRUE  multiplex nucleic acid test. Penicillin and ampicillin are drugs of  choice, nonsusceptible isolates have not been reported. Final  identification and antimicrobial susceptibility testing will be  verified by standard methods.    Specimen tested with Smileigene multiplex, gram-positive blood culture  nucleic acid test for the following targets: Staph aureus, Staph  epidermidis, Staph lugdunensis, other Staph species, Enterococcus  faecalis, Enterococcus faecium, Streptococcus species, S. agalactiae,  S. anginosus grp., S. pneumoniae, S. pyogenes, Listeria sp., mecA  (methicillin resistance) and Elisha/B (vancomycin resistance).    Critical Value/Significant Value called to and read back by Teresa John RN on 9.7.18 at 1340. bw     Cultured on the 1st day of incubation:  Streptococcus agalactiae sero group B  Susceptibility testing done on previous specimen  *  Critical Value/Significant Value, preliminary result only, called to and read back by  Ofe Fernandes RN on RHMS5 @1515 on 9/7/18. .

## 2019-03-13 NOTE — PLAN OF CARE
Discharge Planner PT     PT: Orders received, eval completed, eval only    Patient plan for discharge: Home  Current status: IND with bed mobility and transfers. Mod-I maintaining heel WB status with FWW. Pt has had 4 previous procedures, well prepared with equipment and mobility strategies. Taller FWW provided, post-op shoe straps re-sized to fit. No IP PT needs identified.  Barriers to return to prior living situation: None  Recommendations for discharge: Home with assistance for IADLs as needed  Rationale for recommendations: Pt IND/mod-I with all mobility. All needs on first level.       Entered by: Jer Croft 03/13/2019 2:21 PM

## 2019-03-13 NOTE — PROGRESS NOTES
Assumed cares at 0700, pt ambulating independently in room, showered and surgical scrub completed prior to leaving for procedure at 1530 - arrived back to unit at 1800, R 4th toe amputation with wrap intact to foot and I&D to L foot with mepilex intact. Ice to both feet and scheduled tylenol for comfort-denies pain. VSS,  on and pt refusing capnography - no narcotics utilized at this time. BGs today 162 / 175 / 151 - pt refused scheduled 1 unit for sliding scale x2 today and only received x1 dose of 4 units per MD this AM to cover breakfast per pt request - plan to assess BG at HS and address insulin orders as needed for night otherwise with rounding AM doctor to ensure home regimen is resumed appropriately (sliding scale plus coverage each meal time. Wife at bedside providing dinner - mod  Carb per pt request-tolerating well. Heel wheight-bearing to BLE with ortho shoes post-op and not out of bed yet - using urinal at bedside and voiding adequately. PIV saline locked, continues unasyn Q6H. Discharge TBD.

## 2019-03-13 NOTE — PROGRESS NOTES
03/13/19 1400   Quick Adds   Type of Visit Initial PT Evaluation   Living Environment   Lives With spouse   Living Arrangements house   Living Environment Comment Stairs to upstairs bedroom, has a chair lift. Chair lift is currently broken, planning to stay on main floor until fixed.   Self-Care   Usual Activity Tolerance moderate   Current Activity Tolerance moderate   Equipment Currently Used at Home cane, straight;shower chair;walker, rolling;wheelchair, manual;glucometer   Activity/Exercise/Self-Care Comment IND with ADLs. Well versed in using his equipment from previous procedures.   Functional Level Prior   Ambulation 1-->assistive equipment   Transferring 1-->assistive equipment   Toileting 1-->assistive equipment   Bathing 1-->assistive equipment   Fall history within last six months no   Which of the above functional risks had a recent onset or change? none   Prior Functional Level Comment Mod-I with mobility at baseline   General Information   Onset of Illness/Injury or Date of Surgery - Date 03/10/19   Referring Physician Ryan Bhagat DPM   Patient/Family Goals Statement Home with medically cleared   Pertinent History of Current Problem (include personal factors and/or comorbidities that impact the POC) POD1 sp partial R 4th toe amputation and left foot wound debridement.   Precautions/Limitations no known precautions/limitations   Weight-Bearing Status - LLE (Heel WB)   Weight-Bearing Status - RLE (Heel WB)   General Info Comments Pt reports being very comfortable with mobility on restricted WB status   Cognitive Status Examination   Orientation orientation to person, place and time   Level of Consciousness alert   Follows Commands and Answers Questions 100% of the time   Pain Assessment   Patient Currently in Pain No   Integumentary/Edema   Integumentary/Edema Comments Dressing CDI   Posture    Posture Forward head position   Range of Motion (ROM)   ROM Comment WFL other than fixed R ankle in  "bandage   Strength   Strength Comments NT   Bed Mobility   Bed Mobility Comments IND   Transfer Skills   Transfer Comments IND   Gait   Gait Comments IND with FWW mainting WB status   Balance   Balance Comments Requires UE support for WB status   Sensory Examination   Sensory Perception Comments Neuropathy   Clinical Impression   Criteria for Skilled Therapeutic Intervention evaluation only   PT Diagnosis Impaired gait   Influenced by the following impairments WB status   Functional limitations due to impairments None   Clinical Presentation Stable/Uncomplicated   Clinical Presentation Rationale Medically stable   Clinical Decision Making (Complexity) Low complexity   Anticipated Equipment Needs at Discharge (Has all DME)   Anticipated Discharge Disposition Home with Assist   Risk & Benefits of therapy have been explained Yes   Patient, Family & other staff in agreement with plan of care Yes   Clinical Impression Comments Pt has been IND in room. Post-op shoe resized to fit and taller walker provided.   [facility] [employer] TM \"6 Clicks\"   2016, Trustees of [facility], under license to [employer].  All rights reserved.   6 Clicks Short Forms Basic Mobility Inpatient Short Form   [facility] AM-PAC  \"6 Clicks\" V.2 Basic Mobility Inpatient Short Form   1. Turning from your back to your side while in a flat bed without using bedrails? 4 - None   2. Moving from lying on your back to sitting on the side of a flat bed without using bedrails? 4 - None   3. Moving to and from a bed to a chair (including a wheelchair)? 4 - None   4. Standing up from a chair using your arms (e.g., wheelchair, or bedside chair)? 4 - None   5. To walk in hospital room? 4 - None   6. Climbing 3-5 steps with a railing? 2 - A Lot   Basic Mobility Raw Score (Score out of 24.Lower scores equate to lower levels of function) 22   Total Evaluation Time   Total Evaluation Time (Minutes) 14     "

## 2019-03-13 NOTE — PROGRESS NOTES
Focus: wounds  D: per Podiatry note surgery performed yesterday and wound care provided: A dry sterile dressing was applied to the patient's right foot and a 4 x 4 Mepilex bandage was applied to the ulcer sub first MP joint on the left foot.  He appeared to tolerate the procedure and anesthesia well and transferred to the PACU with vital signs stable and vascular status intact to remaining digits.  The patient will be heel weightbearing bilateral and will follow up with me in clinic in approximately 1 week or sooner if any acute issues.     I: review of chart    A/P: followed by podiatry for wound care as described above.   WOC will sign off

## 2019-03-13 NOTE — PLAN OF CARE
A&O, SBA.   VSS, RA, rates pain 2/10-scheduled tylenol.   LS clear, denies SOB.  BS active x4, voiding well.  Dressing to RLE and mepilex to L foot CDI, boots when out of bed.   Toe cx show heavy growth staph aureus.  Continuing IV unasyn.   Plan to resume Eliquis this robe.   ID, PT, Podiatry following.   Will continue to monitor.

## 2019-03-13 NOTE — PLAN OF CARE
Pt denies pain. Scheduled tylenol. Right foot wrapped. Dressing CDI. CMS intact. Ice applied. +2 edema. Elevated on two pillows. Left foot mepilix dressing CDI. Ice applied. Elevated with two pillows. . Covered with S/s and lantus. Mod carb diet. WOC ID and PT consults.

## 2019-03-13 NOTE — OP NOTE
Procedure Date: 03/12/2019      SURGEON:  Ryan Bhagat DPM      ASSISTANT:  None.      PREOPERATIVE DIAGNOSES:   1.  Poorly controlled diabetes mellitus type 2.   2.  Osteomyelitis, right fourth toe.   3.  Preulcerative lesion distal left second toe.      POSTOPERATIVE DIAGNOSES:     1.  Poorly controlled diabetes mellitus type 2.     2.  Osteomyelitis, right fourth toe.     3.  Preulcerative lesion, distal left second toe.      PROCEDURE:   1.  Partial right fourth toe amputation.   2.  Debridement of left foot ulcer sub first MPJ 2 x 2.5 cm down to and including subcutaneous tissue.   3.  Callus debridement for preulcerative lesion, left second toe.        SPECIMENS:  Pathology tissue was sent off for aerobic and anaerobic cultures from the right fourth toe.      ANESTHESIA:  MAC with local.      HEMOSTASIS:  None.      ESTIMATED BLOOD LOSS:  3 mL.         MATERIALS:  None.        INJECTABLES:   8 mL of 0.25% bupivacaine plain for the right fourth toe amputation.  The left foot required no local anesthetic.      COMPLICATIONS:  None apparent.      INDICATIONS:  The patient is a pleasant 68-year-old neuropathic diabetic male who has undergone multiple right toe amputations.  He presented with a worsening distal right fourth toe wound.  An MRI showed marrow edema changes suspicious for osteomyelitis.  He was brought to the operating room for surgical management as the extensive cellulitis associated with this as well.  He also had a chronic wound at the plantar left first MP joint and a preulcerative lesion at the distal aspect of the left second toe.      DESCRIPTION OF PROCEDURE:  After obtaining written consent, the patient was transferred to the operating room, placed in the supine position on the operating table.  IV sedation was initiated.  The right foot was anesthetized with a preoperative local.  He was then prepped and draped in normal aseptic fashion.  No tourniquet was utilized on either side.       PROCEDURE 1:  Attention was directed to the right fourth toe where a fishmouth incision with apices dorsal and plantar was carried down to the underlying bone at the level of the proximal interphalangeal joint.  The soft tissue was reflected off of the head of the proximal phalanx and a sagittal saw was used to create a through and through osteotomy through the neck of the proximal phalanx.  The distal aspect of the toe was placed on the back table and deep tissue was sent off for cultures.  The wound was flushed with copious amounts of normal saline.  After adequate hemostasis was achieved, I elected to do a single layer closure with 3-0 nylon as the base of the wound appeared quite healthy.  There was no purulence or necrotic tissue noted.      Attention was then directed to the left foot which was similarly prepped and draped.  Using a rongeur a 15 blade and a tissue nipper, the wound was debrided down to and including subcutaneous tissue 2.5 x 2.0 cm, excising all nonviable tissue sharply with the above instrumentation.  Attention was then directed to the left second toe.  There is a large callus with intralesional hemorrhaging concerning for underlying ulceration.  This was debrided with a tissue nipper as well and no underlying ulceration was noted.      A dry sterile dressing was applied to the patient's right foot and a 4 x 4 Mepilex bandage was applied to the ulcer sub first MP joint on the left foot.  He appeared to tolerate the procedure and anesthesia well and transferred to the PACU with vital signs stable and vascular status intact to remaining digits.  The patient will be heel weightbearing bilateral and will follow up with me in clinic in approximately 1 week or sooner if any acute issues.         CONCHITA LOO DPM             D: 2019   T: 2019   MT: SAMANTHA      Name:     PORTER YANCEY   MRN:      [id number]        Account:        [id number]   :      1950            Procedure Date: 03/12/2019      Document: [id number]

## 2019-03-13 NOTE — PLAN OF CARE
Orientation: Alert and oriented x 4  VSS. 97% on RA.   HR 74.   LS: Clear and equal  GI:  Passing gas. 1 BM. Denies N/V.   : Adequate urine output.   Skin: clean and dry. L foot, mepilex on post IND, dressing CDI. R foot amputation of 4th toe, dressing CDI, Edema in ankles/feet +1/+2. Has neuropathy in BLE. CMS intact.  Activity: Assist x 1. W/ GB and ortho shoes, heel weight bearing.. Pt slept comfortably throughout shift.   Pain: 4/10. Scheduled tylenol and ice.  Plan: Continue with current cares. Admitted 3/10 for R leg cellulitis and foot ulcer. Hx of DM2, Thyroid disease, syncope, aleep apnea, MI, Cellulitis, CAD, cardiomyopathy and cerbral infarction and A fib.  & 236. On IV Unasyn. WOC, ID, PT and Ortho following. Discharge TBD.

## 2019-03-14 LAB
ANION GAP SERPL CALCULATED.3IONS-SCNC: 6 MMOL/L (ref 3–14)
BUN SERPL-MCNC: 18 MG/DL (ref 7–30)
CALCIUM SERPL-MCNC: 8.5 MG/DL (ref 8.5–10.1)
CHLORIDE SERPL-SCNC: 102 MMOL/L (ref 94–109)
CO2 SERPL-SCNC: 30 MMOL/L (ref 20–32)
CREAT SERPL-MCNC: 1.32 MG/DL (ref 0.66–1.25)
ERYTHROCYTE [DISTWIDTH] IN BLOOD BY AUTOMATED COUNT: 12.9 % (ref 10–15)
GFR SERPL CREATININE-BSD FRML MDRD: 55 ML/MIN/{1.73_M2}
GLUCOSE BLDC GLUCOMTR-MCNC: 139 MG/DL (ref 70–99)
GLUCOSE BLDC GLUCOMTR-MCNC: 167 MG/DL (ref 70–99)
GLUCOSE BLDC GLUCOMTR-MCNC: 204 MG/DL (ref 70–99)
GLUCOSE BLDC GLUCOMTR-MCNC: 224 MG/DL (ref 70–99)
GLUCOSE BLDC GLUCOMTR-MCNC: 233 MG/DL (ref 70–99)
GLUCOSE SERPL-MCNC: 152 MG/DL (ref 70–99)
HCT VFR BLD AUTO: 32.4 % (ref 40–53)
HGB BLD-MCNC: 10.8 G/DL (ref 13.3–17.7)
MCH RBC QN AUTO: 28.2 PG (ref 26.5–33)
MCHC RBC AUTO-ENTMCNC: 33.3 G/DL (ref 31.5–36.5)
MCV RBC AUTO: 85 FL (ref 78–100)
PLATELET # BLD AUTO: 216 10E9/L (ref 150–450)
POTASSIUM SERPL-SCNC: 3.7 MMOL/L (ref 3.4–5.3)
RBC # BLD AUTO: 3.83 10E12/L (ref 4.4–5.9)
SODIUM SERPL-SCNC: 138 MMOL/L (ref 133–144)
WBC # BLD AUTO: 4.8 10E9/L (ref 4–11)

## 2019-03-14 PROCEDURE — 25000128 H RX IP 250 OP 636: Performed by: INTERNAL MEDICINE

## 2019-03-14 PROCEDURE — L3260 AMBULATORY SURGICAL BOOT EAC: HCPCS

## 2019-03-14 PROCEDURE — 25000131 ZZH RX MED GY IP 250 OP 636 PS 637: Performed by: STUDENT IN AN ORGANIZED HEALTH CARE EDUCATION/TRAINING PROGRAM

## 2019-03-14 PROCEDURE — 12000000 ZZH R&B MED SURG/OB

## 2019-03-14 PROCEDURE — 25000132 ZZH RX MED GY IP 250 OP 250 PS 637: Performed by: INTERNAL MEDICINE

## 2019-03-14 PROCEDURE — 36415 COLL VENOUS BLD VENIPUNCTURE: CPT | Performed by: INTERNAL MEDICINE

## 2019-03-14 PROCEDURE — 99232 SBSQ HOSP IP/OBS MODERATE 35: CPT | Performed by: INTERNAL MEDICINE

## 2019-03-14 PROCEDURE — 25000132 ZZH RX MED GY IP 250 OP 250 PS 637: Performed by: STUDENT IN AN ORGANIZED HEALTH CARE EDUCATION/TRAINING PROGRAM

## 2019-03-14 PROCEDURE — 25000132 ZZH RX MED GY IP 250 OP 250 PS 637: Performed by: PODIATRIST

## 2019-03-14 PROCEDURE — G0463 HOSPITAL OUTPT CLINIC VISIT: HCPCS | Mod: 25

## 2019-03-14 PROCEDURE — 85027 COMPLETE CBC AUTOMATED: CPT | Performed by: INTERNAL MEDICINE

## 2019-03-14 PROCEDURE — 97602 WOUND(S) CARE NON-SELECTIVE: CPT

## 2019-03-14 PROCEDURE — 00000146 ZZHCL STATISTIC GLUCOSE BY METER IP

## 2019-03-14 PROCEDURE — 80048 BASIC METABOLIC PNL TOTAL CA: CPT | Performed by: INTERNAL MEDICINE

## 2019-03-14 PROCEDURE — 40000141 ZZH STATISTIC PERIPHERAL IV START W/O US GUIDANCE

## 2019-03-14 RX ORDER — AMPICILLIN AND SULBACTAM 2; 1 G/1; G/1
3 INJECTION, POWDER, FOR SOLUTION INTRAMUSCULAR; INTRAVENOUS EVERY 6 HOURS
Status: DISCONTINUED | OUTPATIENT
Start: 2019-03-14 | End: 2019-03-15

## 2019-03-14 RX ADMIN — CARVEDILOL 25 MG: 25 TABLET, FILM COATED ORAL at 08:11

## 2019-03-14 RX ADMIN — INSULIN GLARGINE 20 UNITS: 100 INJECTION, SOLUTION SUBCUTANEOUS at 22:46

## 2019-03-14 RX ADMIN — APIXABAN 5 MG: 5 TABLET, FILM COATED ORAL at 08:11

## 2019-03-14 RX ADMIN — APIXABAN 5 MG: 5 TABLET, FILM COATED ORAL at 20:11

## 2019-03-14 RX ADMIN — AMPICILLIN AND SULBACTAM 3 G: 2; 1 INJECTION, POWDER, FOR SOLUTION INTRAVENOUS at 23:37

## 2019-03-14 RX ADMIN — PANTOPRAZOLE SODIUM 40 MG: 40 TABLET, DELAYED RELEASE ORAL at 05:18

## 2019-03-14 RX ADMIN — AMPICILLIN AND SULBACTAM 3 G: 2; 1 INJECTION, POWDER, FOR SOLUTION INTRAVENOUS at 18:23

## 2019-03-14 RX ADMIN — AMOXICILLIN AND CLAVULANATE POTASSIUM 1 TABLET: 875; 125 TABLET, FILM COATED ORAL at 12:02

## 2019-03-14 RX ADMIN — ACETAMINOPHEN 975 MG: 325 TABLET, FILM COATED ORAL at 18:23

## 2019-03-14 RX ADMIN — LEVOTHYROXINE SODIUM 137 MCG: 137 TABLET ORAL at 22:46

## 2019-03-14 RX ADMIN — ISOSORBIDE MONONITRATE 30 MG: 30 TABLET, EXTENDED RELEASE ORAL at 08:11

## 2019-03-14 RX ADMIN — POTASSIUM CHLORIDE 10 MEQ: 750 TABLET, FILM COATED, EXTENDED RELEASE ORAL at 08:11

## 2019-03-14 RX ADMIN — CARVEDILOL 25 MG: 25 TABLET, FILM COATED ORAL at 18:23

## 2019-03-14 RX ADMIN — POTASSIUM CHLORIDE 10 MEQ: 750 TABLET, FILM COATED, EXTENDED RELEASE ORAL at 20:11

## 2019-03-14 RX ADMIN — ATORVASTATIN CALCIUM 40 MG: 40 TABLET, FILM COATED ORAL at 20:11

## 2019-03-14 RX ADMIN — ACETAMINOPHEN 975 MG: 325 TABLET, FILM COATED ORAL at 01:42

## 2019-03-14 RX ADMIN — FUROSEMIDE 80 MG: 40 TABLET ORAL at 08:11

## 2019-03-14 RX ADMIN — AMPICILLIN SODIUM AND SULBACTAM SODIUM 3 G: 2; 1 INJECTION, POWDER, FOR SOLUTION INTRAMUSCULAR; INTRAVENOUS at 05:17

## 2019-03-14 ASSESSMENT — ACTIVITIES OF DAILY LIVING (ADL)
ADLS_ACUITY_SCORE: 16

## 2019-03-14 ASSESSMENT — MIFFLIN-ST. JEOR: SCORE: 1960.13

## 2019-03-14 NOTE — PLAN OF CARE
Pt vitals stable, does not complain of pain. Cellulitis to R leg much improved- faded light pink areas.  Bilateral feet wrapped. Continues on unasyn. Mod carb diet. Using orthotic shoe is partial weight bearing, independent. Calls appropriately if needing assistance. Hopeful to discharge today or tomorrow.

## 2019-03-14 NOTE — PLAN OF CARE
A&O, independent.   VSS, RA, denies pain, declines sched Tylenol.  LS clear, denies SOB.  BS active x4, voiding well.  Dressings to RLE and LLE CDI, boots when out of bed.   Started on PO Augmentin.   BGs 139, 167--sliding scale insulin, CHO count.   Possible discharge this afternoon.  Will continue to monitor.

## 2019-03-14 NOTE — PROGRESS NOTES
"[facility]  3/14/19    A/P:  68-year old male with poorly controlled type 2 DM status post partial right 4th toe amputation for treatment of osteomyelitis and status post excisional debridement, left foot ulcer 3/12/19; POD #2.    -Discussed condition and treatment options including pros and cons.  -Advised continued IV abx for another day -- discussed with hospitalist.  -Dressing changed.  -WOC RN orders in place for L foot.  -Pt has offloading shoe -- minimal heel wt bearing.  -Abx per ID.  -Dr. Root will f/u tomorrow.  Likely discharge tomorrow if stable.    Sabino Garcia DPM, FACFAS  Pager: [phone]    S:  Pt seen at bedside.  No acute issues.    O:  /78 (BP Location: Left arm)   Pulse 87   Temp 97.6  F (36.4  C) (Oral)   Resp 18   Ht 1.93 m (6' 4\")   Wt 108.9 kg (240 lb)   SpO2 94%   BMI 29.21 kg/m    NAD.    R foot:    Incision is coapted  No dehiscence  Slight duskiness along incision  Persisting erythema of 4th toe with some redness into forefoot    L foot:   2.0cm x 2.5cm x 0.2cm deep ulceration plantar medial first metatarsal head.   No erythema, purulence, malodor      All cultures:  Recent Labs   Lab 03/12/19  1642   CULT Heavy growth  Staphylococcus aureus  Susceptibility testing in progress  *  Light growth  Enterococcus faecalis  *  Culture in progress  Culture negative monitoring continues         "

## 2019-03-14 NOTE — PROGRESS NOTES
Pt now agreeable that I give a hand off to [facility] in case he needs anything from primary care.  Denise RICE CTS [phone]

## 2019-03-14 NOTE — PROGRESS NOTES
[facility]  Hospitalist Progress Note          Assessment and Plan:   Jayro Elder was admitted on 3/10/2019.  The following problems were addressed during his hospitalization:     Jayro Elder is a 68 year old male admitted on 3/10/2019 with foot pain swelling and redness right foot.  Patient has history of type 2 diabetes, coronary artery disease with multiple stents, ischemic cardiomyopathy and chronic kidney disease.  Recently hospitalized in Jan with foot infection and CHF exacerbation + ABNER.  Patient was admitted and was started on IV antibiotic, infectious disease physician was consulted as well as podiatry service.  Amputation is planned on 3/12/19.    Diabetic foot infection with acute cellulitis and osteomyelitis involving right foot/fourth toe necrotic ulcer  S/P debridement and 4th toe amputation 3/12/19:  - has been on Unasyn IV   - per podiatry, due to residual erythema to continue 1 more day of IV AB if IV access can reestablished & if no IV access possible cont per ID recommendations with Augmentin , keeep in hospital 1 more day & have podiatry see him in the am before discharge   - per ID as likely all major infection resected probably oral therapy acceptable and as L plantar ulcer debridement showed Staph in cxs,recommended 2 weeks po augmentin, I will Follow-up on final cx and adjust as outpt  -  Post-op site cares, activity restrictions per podiatry.     DM Type II:  -  At times somewhat uncontrolled. I put him back on meal insulin with carb count this AM.  Glu trending up.  I will increase lantus.  Consider increasing carb count tomorrow AM if trends up overnight.  -  It should also be noted he had a lot of adjustment up and down on insulin the past several weeks.  Much of this was attributed to his infection issues and Acute Renal Failure.  Renal failure now improved, so I would expect insulin needs to possibly rise.  - BS improving 139- 152 today , continue current  lower dose of Lantus  & titrate     CKD Stage II with recent ABNER: Stable, avoid renal toxic meds.    - continued PTA Lasix, but holding ACEI   - cr improving  1.32(likely at his baseline).    - repeat Cr in am & if improving,consider resuming Lisinopril at discharge.     CAD with prior stents (none in past year), no acute cardiac complaints this admission  Ischemic cardiomyopathy with EF 35% range, Had CHF exac in January  Hyperlipidemia:   - No recent chest pain or SOB.  He feels his cardiac issues have been stable.  Continue coreg 25 mg BID + statin + lasix + imdur.  Appears euvolemic at the moment.  -  Resume daily potassium as he is on daily lasix and has borderline low normal Potassium  -  Discussed anticoagulation with Dr. Root (Podiatry).  He felt anticoag could resume tonight.    -  Formerly on plavix.  This was stopped by cardiology back in Feb and he was continued on just apixaban.  (Please see note from Dr. Tomlin Feb 2019 for details)     Parox Atrial Fibrillation:  -  Apixaban (formerly on plavix + warfarin but changed in Jan).  Resumed apixaban.  -  Continue Coreg     Hypertension: '  Labile BPon PTA  coreg + lasix.   - Consider resuming PTA ACEI  if cr stable & BP elevates further.     Hypothyroidism: Continue levothyroxine     Reflux:  Continue PPI     MARISOL:  Patient not on basline CPAP     Prior CVA, felt embolic:  - Resume apixaban this evening.    Disposition:p oss discharge in am after podiatry sees him.    Dominga Albert MD.  Hospitalist [phone] (7am -6 pm)                 Interval History:   no new complaints              Medications:       acetaminophen  975 mg Oral Q8H     amoxicillin-clavulanate  1 tablet Oral Q12H AURELIO     apixaban ANTICOAGULANT  5 mg Oral BID     atorvastatin  40 mg Oral QPM     carvedilol  25 mg Oral BID w/meals     furosemide  80 mg Oral Daily     influenza Vac Split High-Dose  0.5 mL Intramuscular Prior to discharge     insulin aspart   Subcutaneous TID AC      "insulin aspart  1-10 Units Subcutaneous TID AC     insulin aspart  1-7 Units Subcutaneous At Bedtime     insulin glargine  20 Units Subcutaneous At Bedtime     isosorbide mononitrate  30 mg Oral Daily     levothyroxine  137 mcg Oral At Bedtime     pantoprazole  40 mg Oral QAM AC     potassium chloride ER  10 mEq Oral BID     sodium chloride (PF)  3 mL Intracatheter Q8H     [START ON 3/15/2019] acetaminophen, glucose **OR** dextrose **OR** glucagon, lidocaine 4%, lidocaine (buffered or not buffered), magnesium sulfate, melatonin, ondansetron **OR** ondansetron, oxyCODONE IR, - MEDICATION INSTRUCTIONS -, potassium chloride, potassium chloride with lidocaine, potassium chloride, potassium chloride, potassium chloride, sodium chloride (PF)               Physical Exam:   Blood pressure (!) 150/91, pulse 87, temperature 97.7  F (36.5  C), temperature source Oral, resp. rate 18, height 1.93 m (6' 4\"), weight 108.9 kg (240 lb), SpO2 96 %.  Wt Readings from Last 4 Encounters:   19 108.9 kg (240 lb)   19 111.9 kg (246 lb 9.6 oz)   19 109.9 kg (242 lb 4.8 oz)   19 105.7 kg (233 lb 1.6 oz)         Vital Sign Ranges  Temperature Temp  Av.9  F (36.1  C)  Min: 96.3  F (35.7  C)  Max: 97.9  F (36.6  C)   Blood pressure Systolic (24hrs), Av , Min:127 , Max:150        Diastolic (24hrs), Av, Min:76, Max:91      Pulse Pulse  Av  Min: 87  Max: 87   Respirations Resp  Av.5  Min: 16  Max: 18   Pulse oximetry SpO2  Av.2 %  Min: 95 %  Max: 97 %         Intake/Output Summary (Last 24 hours) at 3/14/2019 1553  Last data filed at 3/14/2019 0152  Gross per 24 hour   Intake 300 ml   Output 800 ml   Net -500 ml       Constitutional: Awake, alert, cooperative, no apparent distress   Lungs: Clear to auscultation bilaterally, no crackles or wheezing   Cardiovascular: Regular rate and rhythm, normal S1 and S2, and no murmur noted   Abdomen: Normal bowel sounds, soft, non-distended, non-tender "   Skin: Both feet wrapped in ace               Data:   All laboratory data reviewed

## 2019-03-14 NOTE — PROGRESS NOTES
S: Pt. seen in [facility]. Male. Dk Lerner. RX: DH2 shoe left. DX: DM, Ulceration Left 1st MTH  O:A: Pt. has a left Darco FF relief shoe at home but was interested in seeing the DH2 shoe to offload left 1st MTH plantar surface that he had surgery on yesterday. Today I F/D a DH2 shoe size XL. I made a relief in shoe to offload 1st MTH plantar surface. Pt. ambulated and stated that he liked this shoe better and that he had better balance when ambulating. Hannah rayo wear and care instructions given.  P: Pt. to be seen as needed.    Bam RESENDIZ,ELINA

## 2019-03-14 NOTE — PROGRESS NOTES
"[facility]  Infectious Disease Progress Note          Assessment and Plan:   IMPRESSION:   1.  A 68-year-old male, well known to me, admitted with new right fourth toe infection with cellulitis into the foot and to the leg.  No major systemic toxicity.   2.  Prior history of multiple recurrent foot infections, both left and right, most recently right foot amputations.   3.  Diabetes mellitus with neuropathy.   4.  Ischemic cardiomyopathy.      RECOMMENDATIONS:   .   1  Likely all major infection resected probably oral therapy  acceptable. L plantar ulcer debridement also  Staph in cxs, to 2 weeks po augmentin, I will Follow-up on final cx and adjust as outpt            Interval History:   no new complaints and doing well; no cp, sob, n/v/d, or abd pain. No fever feels OK op noted cxs staph              Medications:       acetaminophen  975 mg Oral Q8H     amoxicillin-clavulanate  1 tablet Oral Q12H AURELIO     apixaban ANTICOAGULANT  5 mg Oral BID     atorvastatin  40 mg Oral QPM     carvedilol  25 mg Oral BID w/meals     furosemide  80 mg Oral Daily     influenza Vac Split High-Dose  0.5 mL Intramuscular Prior to discharge     insulin aspart   Subcutaneous TID AC     insulin aspart  1-10 Units Subcutaneous TID AC     insulin aspart  1-7 Units Subcutaneous At Bedtime     insulin glargine  20 Units Subcutaneous At Bedtime     isosorbide mononitrate  30 mg Oral Daily     levothyroxine  137 mcg Oral At Bedtime     pantoprazole  40 mg Oral QAM AC     potassium chloride ER  10 mEq Oral BID     sodium chloride (PF)  3 mL Intracatheter Q8H                  Physical Exam:   Blood pressure 148/85, pulse 87, temperature 96.3  F (35.7  C), temperature source Oral, resp. rate 16, height 1.93 m (6' 4\"), weight 108.9 kg (240 lb), SpO2 95 %.  Wt Readings from Last 2 Encounters:   03/14/19 108.9 kg (240 lb)   03/07/19 111.9 kg (246 lb 9.6 oz)     Vital Signs with Ranges  Temp:  [96.3  F (35.7  C)-97.9  F (36.6 "  C)] 96.3  F (35.7  C)  Pulse:  [87] 87  Heart Rate:  [71-86] 71  Resp:  [16] 16  BP: (127-148)/(76-85) 148/85  SpO2:  [95 %-97 %] 95 %    Constitutional: Awake, alert, cooperative, no apparent distress   Lungs: Clear to auscultation bilaterally, no crackles or wheezing   Cardiovascular: Regular rate and rhythm, normal S1 and S2, and no murmur noted   Abdomen: Normal bowel sounds, soft, non-distended, non-tender   Skin: No rashes, no cyanosis, sl edema 4th toe same red less foot   Other:           Data:   All microbiology laboratory data reviewed.  Recent Labs   Lab Test 03/14/19  0634 03/12/19  0617 03/11/19  0653   WBC 4.8 5.7 7.3   HGB 10.8* 10.4* 10.4*   HCT 32.4* 31.1* 31.2*   MCV 85 86 86    184 190     Recent Labs   Lab Test 03/14/19  0634 03/13/19  0621 03/12/19  0617   CR 1.32* 1.37* 1.29*     Recent Labs   Lab Test 03/10/19  2054   SED 25*     Recent Labs   Lab Test 03/12/19  1642 01/17/19  1903 01/06/19  1436 01/05/19  1708 01/05/19  1558 09/09/18  1533 09/08/18  0613 09/06/18  1550 09/06/18  1545   CULT Heavy growth  Staphylococcus aureus  Susceptibility testing in progress  *  Culture in progress  Culture negative monitoring continues No growth Light growth  Peptoniphilus asaccharolyticus  Susceptibility testing not routinely done  *  Light growth  Clostridium species, not perfringens  Susceptibility testing not routinely done  *  Heavy growth  Finegoldia magna (Peptostreptococcus jennifer)  Susceptibility testing not routinely done  *  Heavy growth  Streptococcus dysgalactiae serogroup C/G  *  Moderate growth  Staphylococcus lugdunensis  *  Light growth  Streptococcus dysgalactiae serogroup C/G  Susceptibility testing done on previous specimen  *  Single colony  Coagulase negative Staphylococcus  Susceptibility testing not routinely done  These bacteria are part of normal skin michael, but on occasion, may be true pathogens.    Clinical correlation must be applied to interpreting this  microbiology result.  * No growth No growth No growth No growth Cultured on the 1st day of incubation:  Streptococcus agalactiae sero group B  *  Critical Value/Significant Value, preliminary result only, called to and read back by  Teresa John RN on RHMS5 @1055 on 9/7/18. .    (Note)  POSITIVE for STREPTOCOCCUS AGALACTIAE (Group B Strep) by [employer]  multiplex nucleic acid test. Penicillin and ampicillin are drugs of  choice, nonsusceptible isolates have not been reported. Final  identification and antimicrobial susceptibility testing will be  verified by standard methods.    Specimen tested with [employer] multiplex, gram-positive blood culture  nucleic acid test for the following targets: Staph aureus, Staph  epidermidis, Staph lugdunensis, other Staph species, Enterococcus  faecalis, Enterococcus faecium, Streptococcus species, S. agalactiae,  S. anginosus grp., S. pneumoniae, S. pyogenes, Listeria sp., mecA  (methicillin resistance) and Elisha/B (vancomycin resistance).    Critical Value/Significant Value called to and read back by Teresa John RN on 9.7.18 at 1340. bw     Cultured on the 1st day of incubation:  Streptococcus agalactiae sero group B  Susceptibility testing done on previous specimen  *  Critical Value/Significant Value, preliminary result only, called to and read back by  Ofe Fernandes RN on RHMS5 @1515 on 9/7/18. .

## 2019-03-14 NOTE — PROGRESS NOTES
[facility]  Hospitalist Progress Note  Name: Jayro Elder    MRN: [id number]  Physician:  Anthony Baker DO, FHM (Text Page)    Summary of Stay:  Jayro Elder is a 68 year old male admitted on 3/10/2019 with foot pain swelling and redness right foot.  Patient has history of type 2 diabetes, coronary artery disease with multiple stents, ischemic cardiomyopathy and chronic kidney disease.  Recently hospitalized in Jan with foot infection and CHF exacerbation + ABNER.  Patient was admitted and was started on IV antibiotic, infectious disease physician was consulted as well as podiatry service.  Amputation is planned on 3/12/19.    Assessment & Plan    Diabetic foot infection with acute cellulitis and osteomyelitis involving right foot/fourth toe necrotic ulcer  S/P debridement and 4th toe amputation 3/12/19:  -  Continue Unasyn IV.  Will defer abx to ID.  Likely to oral soon.  -  Post-op site cares, activity restrictions per podiatry.    DM Type II:  -  At times somewhat uncontrolled. I put him back on meal insulin with carb count this AM.  Glu trending up.  I will increase lantus.  Consider increasing carb count tomorrow AM if trends up overnight.  -  It should also be noted he had a lot of adjustment up and down on insulin the past several weeks.  Much of this was attributed to his infection issues and Acute Renal Failure.  Renal failure now improved, so I would expect insulin needs to possibly rise.    CKD Stage II with recent ABNER: Stable, avoid renal toxic meds.  ACEI has been on hold.  Consider resuming in 1-2 days.    CAD with prior stents (none in past year), no acute cardiac complaints this admission  Ischemic cardiomyopathy with EF 35% range, Had CHF exac in January  Hyperlipidemia:   - No recent chest pain or SOB.  He feels his cardiac issues have been stable.  Continue coreg 25 mg BID + statin + lasix + imdur.  Appears euvolemic at the moment.  -  Resume daily potassium as he is on daily  lasix and has borderline low normal Potassium  -  Discussed anticoagulation with Dr. Root (Podiatry).  He felt anticoag could resume tonight.    -  Formerly on plavix.  This was stopped by cardiology back in Feb and he was continued on just apixaban.  (Please see note from Dr. Tomlin Feb 2019 for details)    Parox Atrial Fibrillation:  -  Apixaban (formerly on plavix + warfarin but changed in Jan).  Resume apixaban tonight.  -  Continue Coreg    Hypertension: Borderline high, continue coreg + lasix.  Consider low dose amlodipine tomorrow if BP elevates further.    Hypothyroidism: Continue levothyroxine    Reflux:  Continue PPI    MARISOL:  Patient not on basline CPAP    Prior CVA, felt embolic:  -  Resume apixaban this evening.       Diet: Advance Diet as Tolerated: Regular Diet Adult; 8929-0493 Calories: Moderate Consistent CHO (4-6 CHO units/meal)    DVT Prophylaxis: Pneumatic Compression Devices (apixaban to resume soon)  Taylor Catheter: not present  Code Status: Full Code      Disposition Plan   Expected discharge in a couple days to prior living arrangement once infection treated/surgery completed.     Entered: Anthony Baker 03/13/2019, 7:06 PM       Interval History   Feels well post-op.  No chest pain, sob, nausea.  Feeling well post-op.  Hopes to go home by the end of the week.  No other new complaints.    -Data reviewed today: I reviewed all new labs and imaging reports over the last 24 hours. I personally reviewed no images or EKG's today.    Physical Exam   Temp: 97.9  F (36.6  C) Temp src: Oral BP: 145/85 Pulse: 87 Heart Rate: 74 Resp: 16 SpO2: 96 % O2 Device: None (Room air)    Vitals:    03/10/19 2208 03/12/19 0054 03/13/19 0601   Weight: 109.3 kg (241 lb) 109.7 kg (241 lb 14.4 oz) 109 kg (240 lb 4.8 oz)     Vital Signs with Ranges  Temp:  [95.4  F (35.2  C)-97.9  F (36.6  C)] 97.9  F (36.6  C)  Pulse:  [] 87  Heart Rate:  [74] 74  Resp:  [16-20] 16  BP: (138-147)/(81-94) 145/85  SpO2:  [96 %-97 %]  96 %  I/O last 3 completed shifts:  In: 1530 [P.O.:680; I.V.:850]  Out: 2203 [Urine:2200; Blood:3]    GEN:  Alert, oriented x 3, appears comfortable, no overt distress.  HEENT:  Normocephalic/atraumatic, no scleral icterus, no nasal discharge, mouth moist.  CV:  Regular rate and rhythm, distant.  No loud murmur or rub.  LUNGS:  Clear to auscultation bilaterally without rales/rhonchi/wheezing/retractions.  Symmetric chest rise on inhalation noted.  ABD:  Active bowel sounds, soft, non-tender/non-distended.  No rebound/guarding/rigidity.  EXT:  Trace LE edema.  No cyanosis.  Right foot surgical site dressed (detailed exam deferred to podiatry).  SKIN:  Dry to touch, no exanthems noted in the visualized areas.    Medications     - MEDICATION INSTRUCTIONS -         acetaminophen  975 mg Oral Q8H     ampicillin-sulbactam (UNASYN) IV  3 g Intravenous Q6H     apixaban ANTICOAGULANT  5 mg Oral BID     atorvastatin  40 mg Oral QPM     carvedilol  25 mg Oral BID w/meals     furosemide  80 mg Oral Daily     influenza Vac Split High-Dose  0.5 mL Intramuscular Prior to discharge     insulin aspart   Subcutaneous TID AC     insulin aspart  1-10 Units Subcutaneous TID AC     insulin aspart  1-7 Units Subcutaneous At Bedtime     insulin glargine  20 Units Subcutaneous At Bedtime     isosorbide mononitrate  30 mg Oral Daily     levothyroxine  137 mcg Oral At Bedtime     pantoprazole  40 mg Oral QAM AC     potassium chloride ER  10 mEq Oral BID     sodium chloride (PF)  3 mL Intracatheter Q8H     Data     Recent Labs   Lab 03/12/19  0617 03/11/19  0653 03/10/19  2054   WBC 5.7 7.3 9.0   HGB 10.4* 10.4* 11.3*   HCT 31.1* 31.2* 33.6*   MCV 86 86 86    190 211     Recent Labs   Lab 03/13/19  0621 03/12/19  0617 03/11/19  1601 03/11/19  0653    138  --  138   POTASSIUM 3.7 3.5 4.1 3.2*   CHLORIDE 102 102  --  104   CO2 29 29  --  30   ANIONGAP 6 7  --  4   * 181*  --  121*   BUN 21 21  --  19   CR 1.37* 1.29*  --   1.33*   GFRESTIMATED 52* 56*  --  54*   GFRESTBLACK 61 65  --  63   BETTIE 8.7 8.6  --  8.3*     Recent Labs   Lab 03/13/19  1741 03/13/19  1200 03/13/19  0834 03/13/19  0621 03/13/19  0551 03/13/19  0130  03/12/19  0617  03/11/19  0653  03/10/19  2054 03/07/19  0743   GLC  --   --   --  243*  --   --   --  181*  --  121*  --  300* 202*   * 173* 199*  --  236* 303*   < >  --    < >  --    < >  --   --     < > = values in this interval not displayed.         Recent Results (from the past 24 hour(s))   XR Toe Right G/E 2 Views    Narrative    XR TOE RIGHT G/E 2 VIEWS 3/12/2019 8:13 PM    COMPARISON: 3/10/2019    HISTORY: Partial RIGHT fourth toe amputation.      Impression    IMPRESSION: Postoperative changes of partial RIGHT second and fourth  toe amputations. No definite erosions are seen at this time.  Osteopenia, but no fractures.    JEMIMA GRAHAM MD

## 2019-03-14 NOTE — PROGRESS NOTES
[facility] Nurse Inpatient Wound Assessment     Assessment of wound(s) on pt's:   Left Foot        Data:   Patient History:      per MD note(s): Podiatry :  68-year old male with poorly controlled type 2 DM status post partial right 4th toe amputation for treatment of osteomyelitis and status post excisional debridement, left foot ulcer 3/12/19; POD #1.  Stable     Continue IV abx for another day, as there is some dark, persisting erythema, right foot.  Significant recession from previous ink markings on leg.     Sterile dressing change was done.   [facility] RN consultation for left foot wound care recs.   IP Orthosis Dept for offloading shoe, left foot:  DH2 shoe      Current Diet / Nutrition:       Orders Placed This Encounter        Advance Diet as Tolerated: Regular Diet Adult; 3914-3128 Calories: Moderate Consistent CHO (4-6 CHO units/meal)                discussed protein for wound healing    Wilmer Risk Assessment  Sensory Perception: 3-->slightly limited    Moisture: 4-->rarely moist   Activity: 3-->walks occasionally     Mobility: 3-->slightly limited   Nutrition: 3-->adequate   Wilmer Score: 19    Mattress:  Standard , Atmos Air mattress    Labs:    Recent Labs   Lab Test 03/14/19  0634  01/20/19  0600  01/17/19  1652  01/05/19  1559  09/08/18  0614   ALBUMIN  --   --   --   --  3.3*   < > 3.2*  --   --    HGB 10.8*   < >  --    < > 12.1*   < > 13.2*   < > 12.1*   RBC 3.83*   < >  --    < > 4.18*   < > 4.56   < > 4.11*   WBC 4.8   < >  --    < > 10.1   < > 7.1   < > 5.0      < >  --    < > 301   < > 182   < > 148*   INR  --   --  1.43*   < >  --   --   --   --   --    A1C  --   --   --   --   --   --  11.7*  --   --    CRP  --   --   --   --   --   --   --   --  81.6*    < > = values in this interval not displayed.          Wound Assessment (location #1):   Left Foot  Wound History:  See above, chronic DFU, pt reports minimal walking on his left foot and only to the BR and back to  "chair.     Specific Dimensions (length x width x depth, in cm):   1st MP 3x3x0.3cm ulceration dry with pink tissue and and calloused firm edges. MP joint if very prominent and causes foot deformity.     Periwound Skin: intact calloused edge,      Drainage:    Amount: scant,  Color: serosanguinous    Odor: none    Pain:  insensate ,           Intervention:     Patient's chart evaluated.      Wound(s) was assessed    Wound Care: was done:  Per POC along with demo on wound care; pt continued to speak during the entire demonstration stating \"I won't be able to do that\".     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  Plan to follow with proper shoe for off loading and Podiatry as directed.        Plan:     Nursing to notify the Provider(s) and re-consult the [facility] Nurse if wound(s) deteriorate(s) or if the wound care plan needs reevaluation.    Plan of care for wound located on Left foot bottom of 1st toe: Daily    1. Wash foot with soap and water    2. Rinse wound with wound spray    3. Apply light layer of moisturizing cream (Sween) to periwound and bottom of 2 toe callous    4. Cut postage stamp size piece of non adherent gauze; apply grape amount of Iodosorb gel and push into wound base    5. Apply moisturizing cream to edges to hold dressing in place    6. Cover with dry gauze and secure with flex net or roll gauze    7. Please avoid use of tape on skin, nothing too tight to cause pressure or further wound damage to skin    8. Wear proper shoe for off loading when out of bed;  Elevate foot above heart level for 1 hour twice a day and in the evening.     9. Improve protein intake for wound healing    10. Watch Blood glucose levels and maintain per directions    11. Follow up " with Podiatry as directed        WOC Nurse will return: completed, will sign off

## 2019-03-14 NOTE — PLAN OF CARE
A&Ox4  Ind in room, partial weight bearing  PIV SL  Tolerating a Mod Cho diet  LS clear  BS active, +gas  Skin: Blotchy. Numbness/tingling in extremities. Ranjeet LE dressings d/t Right 4th toe amp and Left foot wound I&D. Orthosis shoes when up   & 245  Tx: Unasyn, Tylenol  Will Cont to monitor per POC

## 2019-03-14 NOTE — DISCHARGE INSTRUCTIONS
Plan of care for wound located on Left foot bottom of 1st toe: Daily    1. Wash foot with soap and water    2. Rinse wound with wound spray    3. Apply light layer of moisturizing cream (Sween) to periwound and bottom of 2 toe callous    4. Cut postage stamp size piece of non adherent gauze; apply grape amount of Iodosorb gel and push into wound base    5. Apply moisturizing cream to edges to hold dressing in place    6. Cover with dry gauze and secure with flex net or roll gauze    7. Please avoid use of tape on skin, nothing too tight to cause pressure or further wound damage to skin    8. Wear proper shoe for off loading when out of bed;  Elevate foot above heart level for 1 hour twice a day and in the evening.     9. Improve protein intake for wound healing    10. Watch Blood glucose levels and maintain per directions    11. Follow up with Podiatry as directed

## 2019-03-15 VITALS
OXYGEN SATURATION: 97 % | HEIGHT: 76 IN | TEMPERATURE: 95.4 F | SYSTOLIC BLOOD PRESSURE: 134 MMHG | DIASTOLIC BLOOD PRESSURE: 81 MMHG | RESPIRATION RATE: 20 BRPM | BODY MASS INDEX: 29.15 KG/M2 | HEART RATE: 87 BPM | WEIGHT: 239.4 LBS

## 2019-03-15 LAB
BACTERIA SPEC CULT: ABNORMAL
BACTERIA SPEC CULT: ABNORMAL
CREAT SERPL-MCNC: 1.29 MG/DL (ref 0.66–1.25)
GFR SERPL CREATININE-BSD FRML MDRD: 56 ML/MIN/{1.73_M2}
GLUCOSE BLDC GLUCOMTR-MCNC: 146 MG/DL (ref 70–99)
GLUCOSE BLDC GLUCOMTR-MCNC: 153 MG/DL (ref 70–99)
GLUCOSE BLDC GLUCOMTR-MCNC: 164 MG/DL (ref 70–99)
GLUCOSE BLDC GLUCOMTR-MCNC: 220 MG/DL (ref 70–99)
SPECIMEN SOURCE: ABNORMAL

## 2019-03-15 PROCEDURE — 99239 HOSP IP/OBS DSCHRG MGMT >30: CPT | Performed by: INTERNAL MEDICINE

## 2019-03-15 PROCEDURE — 25000132 ZZH RX MED GY IP 250 OP 250 PS 637: Performed by: INTERNAL MEDICINE

## 2019-03-15 PROCEDURE — 25000132 ZZH RX MED GY IP 250 OP 250 PS 637: Performed by: STUDENT IN AN ORGANIZED HEALTH CARE EDUCATION/TRAINING PROGRAM

## 2019-03-15 PROCEDURE — 82565 ASSAY OF CREATININE: CPT | Performed by: INTERNAL MEDICINE

## 2019-03-15 PROCEDURE — 25000128 H RX IP 250 OP 636: Performed by: INTERNAL MEDICINE

## 2019-03-15 PROCEDURE — 36415 COLL VENOUS BLD VENIPUNCTURE: CPT | Performed by: INTERNAL MEDICINE

## 2019-03-15 PROCEDURE — 00000146 ZZHCL STATISTIC GLUCOSE BY METER IP

## 2019-03-15 RX ADMIN — AMPICILLIN AND SULBACTAM 3 G: 2; 1 INJECTION, POWDER, FOR SOLUTION INTRAVENOUS at 05:31

## 2019-03-15 RX ADMIN — CARVEDILOL 25 MG: 25 TABLET, FILM COATED ORAL at 08:06

## 2019-03-15 RX ADMIN — POTASSIUM CHLORIDE 10 MEQ: 750 TABLET, FILM COATED, EXTENDED RELEASE ORAL at 08:06

## 2019-03-15 RX ADMIN — APIXABAN 5 MG: 5 TABLET, FILM COATED ORAL at 08:06

## 2019-03-15 RX ADMIN — PANTOPRAZOLE SODIUM 40 MG: 40 TABLET, DELAYED RELEASE ORAL at 05:32

## 2019-03-15 RX ADMIN — FUROSEMIDE 80 MG: 40 TABLET ORAL at 08:06

## 2019-03-15 RX ADMIN — ISOSORBIDE MONONITRATE 30 MG: 30 TABLET, EXTENDED RELEASE ORAL at 08:06

## 2019-03-15 RX ADMIN — CARVEDILOL 25 MG: 25 TABLET, FILM COATED ORAL at 18:14

## 2019-03-15 ASSESSMENT — MIFFLIN-ST. JEOR: SCORE: 1957.41

## 2019-03-15 ASSESSMENT — ACTIVITIES OF DAILY LIVING (ADL)
ADLS_ACUITY_SCORE: 16

## 2019-03-15 NOTE — PLAN OF CARE
Pt vitally stable. No pain, declined scheduled tylenol. R leg cellulitis much improved, some redness remains by toes. Bilateral feet dressings c/d/I. Continues on unasyn. Independent in room- uses orthotic shoe for minimal heel weight bearing. Mod carb diet. Is hopeful to discharge today.

## 2019-03-15 NOTE — PLAN OF CARE
Pt to D/C to home.  Pt provided with d/c instructions, including new medications, when medications were last given, and when to take them again.  Pt also informed to f/u with Dr. Bhagat next week as well as primary MD in one week.  Pt verbalized understanding of all d/c and f/u instructions.  All questions were answered at this time.  Copy of paperwork sent with pt.  Medication: Augmentin sent with pt.  Wife to provide transport.  All personal belongings sent with pt.

## 2019-03-15 NOTE — PROGRESS NOTES
[facility]     No complaints.  Ready for discharge to home.     Exam:  B/P: 131/79, T: 96.5, P: 87, R: 20    Right Foot:    Incision is coapted  No dehiscence  Slight duskiness along incision  Most of the erythema now resolved     Left Foot:   2.0cm x 2.5cm x 0.2cm deep ulceration plantar medial first metatarsal head.   No erythema, purulence, malodor     Assessment/ Plan:  68-year old male with poorly controlled type 2 DM status post partial right 4th toe amputation for treatment of osteomyelitis and status post excisional debridement, left foot ulcer 3/12/19;.  Stable     Okay to discharge to home from a podiatry standpoint.    Sterile dressing change was done on the right.  Discharge antibiotics per ID: 2 weeks PO augmentin     Foot-relevant discharge orders previously placed.  Recommend follow up with Dr. Bhagat next week       Kwasi Root DPM, FACFAS, MS     [place] Department of Podiatry/Foot & Ankle Surgery

## 2019-03-15 NOTE — PLAN OF CARE
Patient alert and oriented. VSS. Denies pain. IND for mobility, orthotic shoe in place. Dressings CDI. Mod CHO diet with good appetite. , 153. Showered this shift. IV antibiotics switched to oral do to IV loss. Waiting for podiatry to do wound care, will have patient assist. Possible discharge today. Podiatry to be here around 1700 for dressing change.

## 2019-03-15 NOTE — DISCHARGE SUMMARY
Discharge Summary  Hospitalist Service    Jayro Elder MRN# [id number]   YOB: 1950 Age: 68 year old     Date of Admission:  3/10/2019  Date of Discharge:  3/15/2019  Admitting Physician:  Werner Purcell MD  Discharge Physician: Meenu Catherine MD  Discharging Service: Hospitalist Service     Primary Provider: Davion Cordova  Primary Care Physician Phone Number: [phone]         Discharge Diagnoses/Problem Oriented Hospital Course (Providers):    Jayro Elder was admitted on 3/10/2019 by Werner Purcell MD and I would refer you to their history and physical.  The following problems were addressed during his hospitalization:      Right foot cellulitis with right toe osteomyelitis  Left foot chronic ulcer  T2 dm  htn  Ischemic CM  CKD  PAF           Code Status:      Full Code        Brief Hospital Stay Summary Sent Home With Patient in AVS:       Mr Elder is a 69 yo male with PMHx of  CAD with multiple stents and ischemic CM with EF 35-40% secondary to multiple wall motion abnormalities by echocardiogram in January 2019, CKD with baseline creatinine 1.4-2.0, paroxysmal atrial fibrillation on chronic anticoagulation with apixaban, hypertension, and type 2 diabetes with diabetic peripheral angiopathy chronic bilateral foot wounds multiple I&D's, status post right toe amputation on January 6, 2019 with multiple bilateral foot infections who presented and was admitted on 3/10/2019 with evidence of recurrent right foot infection with osteomyelitis and cellulitis.    He was placed on empiric antibiotics in the form of IV clindamycin which was ultimately switched over to ampicillin sulbactam.  He underwent partial fourth right toe amputation, as well as I&D of the left foot ulcer and callus debridement of the left second toe on 3/12/2019.  Cultures from that procedure have grown out a variety of organisms including anaerococcus, Enterococcus faecalis, and MSSA.    Infectious disease has been  involved in the case as has podiatry, and plans are to switch over to amoxicillin clavulanate for 2 weeks at discharge.    He is feeling well and wanting discharge home.  He is ambulating without difficulty with surgical boots.    1.  Diabetic foot infection with acute cellulitis and osteomyelitis involving the right foot/fourth toe.  Presented with increasing infectious symptoms of that right fourth toe and found to have evidence of osteomyelitis.  Initially given clindamycin switched over to amp sulbactam in both infectious disease and podiatry were involved in his care.  He underwent partial right toe amputation.  He is actually doing well feeling well podiatry did want to monitor him for an additional day and plans are for discharge today 3/14/19 provided the erythema is improved after podiatry evaluation.  Per ID he will go out for 2 more weeks of amoxicillin clavulanate.    2.  Calluses/chronic infection of the left foot ulcer.  Underwent I&D as described above    3.  Type 2 diabetes: Typically managed with short acting insulin pre-meal, and glargine 36 units every afternoon.  He was periodically n.p.o. and his glargine was decreased to 20 units nightly.  Sugars are rising, so we will discharge at 26 units nightly with close follow-up with primary MD      4.  CKD: On review of his chart it looks like baseline creatinine has been fluctuating of late, baseline is probable in the 1.42 range.  Subacute kidney injury.    5.  Hypertension: Prior to admission medications are isosorbide mononitrate 30 mg daily, lisinopril 5 mg p.o. daily, carvedilol 25 mg p.o. twice daily and furosemide 80 mg p.o. daily.  The carvedilol, furosemide and isosorbide were continued during this hospitalization but lisinopril was held in the setting of concern for acute kidney injury.  Will resume at discharge with close follow-up with primary MD.    6.  Paroxysmal atrial fibrillation: Continued on rate control with beta-blocker, and stroke  prophylaxis with apixaban    7.  Hypothyroidism: Continued on home replacement    8.  Ischemic cardiomyopathy: Most recent echocardiogram was done last month showing an ejection fraction of 30-35%.  No evidence of acute exacerbation.  Continued on baseline beta-blocker ,diuretic, and long-acting nitrate.      DVT prophylaxis: Apixaban  CODE STATUS: Full code         Important Results:      As noted below         Pending Results:        Unresulted Labs Ordered in the Past 30 Days of this Admission     Date and Time Order Name Status Description    3/12/2019 1643 Tissue Culture Aerobic Bacterial Preliminary     3/12/2019 1643 Anaerobic bacterial culture Preliminary             Discharge Instructions and Follow-Up:      Follow-up Appointments     Follow Up      Follow up with Dr. Loo 1-2 weeks post discharge    DR. LOO'S CLINIC LOCATIONS:  MONDAY - [place] TUESDAY - [place] [address] [facility]  [address]  [place] [address] [place], [place] [address] [phone]     THURSDAY AM - [place] THURSDAY PM - [place]  [address] [facility] [address]  [place] [address] [place] [address]  [phone]     FRIDAY AM - [place] SET UP SURGERY: [phone] [address] APPOINTMENTS: [phone]  [place] [address] BILLING QUESTIONS: [phone] FAX NUMBER: [phone]    If Dr. Loo is not available (upcoming vacation):  *PLEASE CALL [phone] TO SCHEDULE*  --------------------------------------------------------------------------  DR. CONCHITA LOO  MONDAY - [place] TUESDAY - [place] [address] [facility]   [address]  [place] [address]   [place], [place] [address] [phone]    THURSDAY AM - [place]  THURSDAY PM - [facility]  [address]  [address] [facility] [address]  [place] [address]   [place] [address]  [phone]    FRIDAY AM - [place]  [address] [place] [address]  [phone]  --------------------------------------------------------------------------  DR. LESTER VÁSQUEZ  MONDAY - [place]  WEDNESDAY - [place]  [address]    [address] [facility] [place] [address]  [phone]     THURSDAY - [place]  [address]  [place] [address]  [phone]  --------------------------------------------------------------------------  DR. KEILY JACKSON  MONDAY - [place]  TUESDAY & FRIDAY AM - [place]  [address]          [address]  [place] [address]        [place] [address]  [phone]           WEDNESDAY - [place]        [address] [facility] [place] [address]  [phone]  ---------------------------------------------------------------------------    DR. ROMARIO ASENCIO  MONDAY AM - [place]  WEDNESDAY - [place]  [address]   [address] [address]   [place] [id number] [phone]    TUESDAY - [place] FRIDAY PM - [place]  [address]   [address]  [place] [address]  [place] [address]  [phone]         Follow-up and recommended labs and tests       F/u with primary MD this week for recheck of BP and BMP after resumption   of lisinopril at discharge (initially held due to ABNER)  F/U blood sugars after decreasing glargine from 36 units at bedtime to 26   units qhs               Discharge Disposition:      Discharged to home         Discharge Medications:        Current Discharge Medication List      START taking these medications    Details   amoxicillin-clavulanate (AUGMENTIN) 875-125 MG tablet Take 1 tablet by mouth 2 times daily for 14 days  Qty: 28 tablet, Refills: 0    Associated Diagnoses: Cellulitis of toe of right foot         CONTINUE these medications which have CHANGED    Details   insulin glargine (LANTUS SOLOSTAR PEN) 100 UNIT/ML pen Inject 26 Units  Subcutaneous every morning    Associated Diagnoses: Type 2 diabetes mellitus with ketoacidosis without coma, unspecified whether long term insulin use (H)         CONTINUE these medications which have NOT CHANGED    Details   apixaban ANTICOAGULANT (ELIQUIS) 5 MG tablet Take 1 tablet (5 mg) by mouth 2 times daily    Associated Diagnoses: Chronic atrial fibrillation (H)      atorvastatin (LIPITOR) 40 MG tablet Take 1 tablet (40 mg) by mouth every evening  Qty: 90 tablet, Refills: 3    Associated Diagnoses: Cerebrovascular accident (CVA) due to embolism of left posterior cerebral artery (H)      carvedilol (COREG) 25 MG tablet Take 1 tablet (25 mg) by mouth 2 times daily (with meals)  Qty: 180 tablet, Refills: 3    Associated Diagnoses: Acute on chronic systolic congestive heart failure (H); Hypertension goal BP (blood pressure) < 140/90      Cholecalciferol (VITAMIN D3) 2000 units TABS Take 1 tablet by mouth daily      furosemide (LASIX) 80 MG tablet Take 1 tablet (80 mg) by mouth daily  Qty: 90 tablet, Refills: 1    Associated Diagnoses: Chronic systolic congestive heart failure (H)      Insulin Aspart (NOVOLOG SC) Inject 6-8 Units Subcutaneous 3 times daily (with meals) Plus Sliding Scale.  Also, per pt: sometimes uses Insulin Aspart at night if BG is high.      insulin pen needle 31G X 6 MM Use  as directed.  Qty: 100 each, Refills: prn    Associated Diagnoses: Type 2 diabetes mellitus with diabetic peripheral angiopathy without gangrene, unspecified long term insulin use status      isosorbide mononitrate (IMDUR) 30 MG 24 hr tablet Take 1 tablet (30 mg) by mouth daily  Qty: 90 tablet, Refills: 1    Associated Diagnoses: Hypertension goal BP (blood pressure) < 140/90      levothyroxine (SYNTHROID, LEVOTHROID) 137 MCG tablet Take 137 mcg by mouth At Bedtime   Qty: 90 tablet, Refills: 3      lisinopril (PRINIVIL/ZESTRIL) 5 MG tablet Take 1 tablet (5 mg) by mouth daily  Qty: 90 tablet, Refills: 3    Associated  "Diagnoses: Acute on chronic systolic congestive heart failure (H); Chronic atrial fibrillation (H)      nitroglycerin (NITROSTAT) 0.4 MG sublingual tablet Place 1 tablet (0.4 mg) under the tongue every 5 minutes as needed for chest pain  Qty: 50 tablet, Refills: 0    Associated Diagnoses: Other chest pain      pantoprazole (PROTONIX) 40 MG enteric coated tablet Take 1 tablet (40 mg) by mouth every morning (before breakfast)  Qty: 30 tablet, Refills: 0    Associated Diagnoses: GERD (gastroesophageal reflux disease)      potassium chloride ER (K-DUR/KLOR-CON M) 10 MEQ CR tablet Take 1 tablet (10 mEq) by mouth 2 times daily  Qty: 60 tablet, Refills: 2    Associated Diagnoses: Acute systolic heart failure (H)      !! order for DME Equipment being ordered: Other: surgical shoe male XL  Treatment Diagnosis: sp right 2nd toe amputaion  Qty: 1 Device, Refills: 0    Associated Diagnoses: S/P amputation of lesser toe, right (H)      !! order for DME Equipment being ordered: Walker Wheels () and Walker ()  Treatment Diagnosis: Impaired gait, heel WB bilaterally.  Qty: 1 each, Refills: 0    Associated Diagnoses: Diabetic ulcer of left midfoot associated with diabetes mellitus of other type, unspecified ulcer stage (H)       !! - Potential duplicate medications found. Please discuss with provider.            Allergies:         Allergies   Allergen Reactions     No Known Allergies            Consultations This Hospital Stay:      Consultation during this admission received from infectious disease and podiatry         Condition and Physical on Discharge:      Discharge condition: Stable   Vitals: Blood pressure 141/85, pulse 87, temperature 96.2  F (35.7  C), temperature source Oral, resp. rate 16, height 1.93 m (6' 4\"), weight 108.6 kg (239 lb 6.4 oz), SpO2 98 %.     Constitutional: Pleasant nad looks stated age head nc/at sclera clear    Lungs: ctab nl effort   Cardiovascular: rrr no mrg    Abdomen: S/nt/nd   Skin: W/d " no c/c   Other: Alert and oriented affect appropriate caba, bilateral feet are wrapped         Discharge Time:      Greater than 30 minutes.        Image Results From This Hospital Stay (For Non-EPIC Providers):        Results for orders placed or performed during the hospital encounter of 03/10/19   Foot  XR, G/E 3 views, right    Narrative    FOOT RIGHT THREE OR MORE VIEWS 3/10/2019 9:09 PM     COMPARISON: 1/8/2019    HISTORY: DM foot ulcer 4th toe.    FINDINGS: The second digit has been amputated at the level of the mid  proximal phalanx. The third digit also appears to have been amputated  at the level of the DIP joint. There is moderate hallux valgus. There  is soft tissue swelling about the forefoot. No bone erosion is  identified. No foreign body. There are degenerative changes in the  midfoot. No fracture.      Impression    IMPRESSION:  1. No evidence of osteomyelitis by x-ray.  2. Moderate soft tissue swelling about the forefoot.  3. Second and third digit amputations.  4. Moderate hallux valgus.    MICHELL MCGUIRE MD   MR Foot Right w/o & w Contrast    Narrative    MR FOOT RIGHT WITHOUT AND WITH CONTRAST 3/11/2019 9:37 AM    HISTORY: Fourth right toe blister in diabetic patient. Evaluate for  osteomyelitis.    TECHNIQUE: Multisequence multiplanar MRI without and with IV contrast.  11 mL Gadavist given intravenously.    COMPARISON: 1/6/2019.    FINDINGS: There is abnormal bone marrow edema without significant  contrast enhancement involving the distal phalanx of the fourth toe,  corresponding to the area of current clinical concern (image 13 of  series 4). This is a new finding since the prior exam and is not  entirely specific since there is no definite cortical effacement. This  could be reactive bone marrow edema but early osteomyelitis is  suspected.    The prior exam showed amputation of the distal second toe at the  proximal interphalangeal joint. The current study shows interval  further amputation of the  second toe to the level of the mid proximal  phalanx. There is some minimal bone marrow edema associated with  remaining second proximal phalanx, likely postoperative change. Soft  tissues in this region show no acute abnormality. Again seen is prior  amputation of the distal third toe at the distal interphalangeal joint  with no acute-appearing abnormality in this region.    No other potentially acute-appearing bony abnormalities. Hallux valgus  metatarsus varus deformity and advanced osteoarthritis at the first  metatarsophalangeal joint again noted. Degenerative changes at  tarsometatarsal joints are also seen, especially the second  tarsometatarsal joint.    Soft tissue swelling and edema about the fourth toe. No ulcer is seen  and there is no evidence for soft tissue abscess. There is also  moderate nonspecific subcutaneous edema over the dorsum of most of the  forefoot.      Impression    IMPRESSION:  1. Bone marrow edema without definite cortical effacement involving  the distal phalanx of the fourth toe associated with surrounding soft  tissue edema and swelling. The findings are not entirely specific, but  early osteomyelitis in the distal phalanx is suspected.  2. Interval revision of second toe amputation which is now at the mid  proximal phalanx level.  3. No other changes since the prior exam.    JOHN VELAZCO MD   XR Toe Right G/E 2 Views    Narrative    XR TOE RIGHT G/E 2 VIEWS 3/12/2019 8:13 PM    COMPARISON: 3/10/2019    HISTORY: Partial RIGHT fourth toe amputation.      Impression    IMPRESSION: Postoperative changes of partial RIGHT second and fourth  toe amputations. No definite erosions are seen at this time.  Osteopenia, but no fractures.    JEMIMA GRAHAM MD           Most Recent Lab Results In EPIC (For Non-EPIC Providers):    Most Recent 3 CBC's:  Recent Labs   Lab Test 03/14/19  0634 03/12/19  0617 03/11/19  0653   WBC 4.8 5.7 7.3   HGB 10.8* 10.4* 10.4*   MCV 85 86 86    184 190       Most Recent 3 BMP's:  Recent Labs   Lab Test 03/15/19  0701 03/14/19  0634 03/13/19  0621 03/12/19  0617   NA  --  138 137 138   POTASSIUM  --  3.7 3.7 3.5   CHLORIDE  --  102 102 102   CO2  --  30 29 29   BUN  --  18 21 21   CR 1.29* 1.32* 1.37* 1.29*   ANIONGAP  --  6 6 7   BETTIE  --  8.5 8.7 8.6   GLC  --  152* 243* 181*     Most Recent 3 Troponin's:No lab results found.  Most Recent 3 INR's:  Recent Labs   Lab Test 01/20/19  0600 01/19/19  0544 01/18/19  0611   INR 1.43* 1.40* 1.28*     Most Recent 2 LFT's:  Recent Labs   Lab Test 01/17/19  1652 01/13/19  0702   AST 10 10   ALT 17 15   ALKPHOS 77 66   BILITOTAL 1.2 0.5     Most Recent Cholesterol Panel:  Recent Labs   Lab Test 01/18/19  0611   CHOL 81   LDL 33   HDL 32*   TRIG 83     Most Recent 6 Bacteria Isolates From Any Culture (See EPIC Reports for Culture Details):  Recent Labs   Lab Test 03/12/19  1642 01/17/19  1903 01/06/19  1436 01/05/19  1708 01/05/19  1558 09/09/18  1533   CULT Heavy growth  Anaerococcus species  No further identification  Susceptibility testing not routinely done  *  Culture in progress  Heavy growth  Staphylococcus aureus  *  Light growth  Enterococcus faecalis  Susceptibility testing in progress  * No growth Light growth  Peptoniphilus asaccharolyticus  Susceptibility testing not routinely done  *  Light growth  Clostridium species, not perfringens  Susceptibility testing not routinely done  *  Heavy growth  Finegoldia magna (Peptostreptococcus jennifer)  Susceptibility testing not routinely done  *  Heavy growth  Streptococcus dysgalactiae serogroup C/G  *  Moderate growth  Staphylococcus lugdunensis  *  Light growth  Streptococcus dysgalactiae serogroup C/G  Susceptibility testing done on previous specimen  *  Single colony  Coagulase negative Staphylococcus  Susceptibility testing not routinely done  These bacteria are part of normal skin michael, but on occasion, may be true pathogens.    Clinical correlation must be  applied to interpreting this microbiology result.  * No growth No growth No growth     Most Recent TSH, T4 and HgbA1c:   Recent Labs   Lab Test 01/05/19  1559 09/06/18  1542   TSH  --  0.40   A1C 11.7* 9.4*

## 2019-03-15 NOTE — PROGRESS NOTES
"[facility]  Infectious Disease Progress Note          Assessment and Plan:   IMPRESSION:   1.  A 68-year-old male, well known to me, admitted with new right fourth toe infection with cellulitis into the foot and to the leg.  No major systemic toxicity.   2.  Prior history of multiple recurrent foot infections, both left and right, most recently right foot amputations.   3.  Diabetes mellitus with neuropathy.   4.  Ischemic cardiomyopathy.      RECOMMENDATIONS:   .   1  Likely all major infection resected probably oral therapy  acceptable. L plantar ulcer debridement also  Staph in cxs, to 2 weeks po augmentin, Iv failed again po if OK with positry            Interval History:   no new complaints and doing well; no cp, sob, n/v/d, or abd pain. No fever feels OK op noted cxs staph              Medications:       acetaminophen  975 mg Oral Q8H     amoxicillin-clavulanate  1 tablet Oral Q12H AURELIO     apixaban ANTICOAGULANT  5 mg Oral BID     atorvastatin  40 mg Oral QPM     carvedilol  25 mg Oral BID w/meals     furosemide  80 mg Oral Daily     influenza Vac Split High-Dose  0.5 mL Intramuscular Prior to discharge     insulin aspart   Subcutaneous TID AC     insulin aspart  1-10 Units Subcutaneous TID AC     insulin aspart  1-7 Units Subcutaneous At Bedtime     insulin glargine  20 Units Subcutaneous At Bedtime     isosorbide mononitrate  30 mg Oral Daily     levothyroxine  137 mcg Oral At Bedtime     pantoprazole  40 mg Oral QAM AC     potassium chloride ER  10 mEq Oral BID     sodium chloride (PF)  3 mL Intracatheter Q8H                  Physical Exam:   Blood pressure 141/85, pulse 87, temperature 96.2  F (35.7  C), temperature source Oral, resp. rate 16, height 1.93 m (6' 4\"), weight 108.6 kg (239 lb 6.4 oz), SpO2 98 %.  Wt Readings from Last 2 Encounters:   03/15/19 108.6 kg (239 lb 6.4 oz)   03/07/19 111.9 kg (246 lb 9.6 oz)     Vital Signs with Ranges  Temp:  [96.2  F (35.7  C)-97.6  F " (36.4  C)] 96.2  F (35.7  C)  Heart Rate:  [65-81] 65  Resp:  [16-18] 16  BP: (127-155)/(78-87) 141/85  SpO2:  [94 %-98 %] 98 %    Constitutional: Awake, alert, cooperative, no apparent distress   Lungs: Clear to auscultation bilaterally, no crackles or wheezing   Cardiovascular: Regular rate and rhythm, normal S1 and S2, and no murmur noted   Abdomen: Normal bowel sounds, soft, non-distended, non-tender   Skin: No rashes, no cyanosis, sl edema 4th toe same red less foot   Other:           Data:   All microbiology laboratory data reviewed.  Recent Labs   Lab Test 03/14/19  0634 03/12/19  0617 03/11/19  0653   WBC 4.8 5.7 7.3   HGB 10.8* 10.4* 10.4*   HCT 32.4* 31.1* 31.2*   MCV 85 86 86    184 190     Recent Labs   Lab Test 03/15/19  0701 03/14/19  0634 03/13/19  0621   CR 1.29* 1.32* 1.37*     Recent Labs   Lab Test 03/10/19  2054   SED 25*     Recent Labs   Lab Test 03/12/19  1642 01/17/19  1903 01/06/19  1436 01/05/19  1708 01/05/19  1558 09/09/18  1533 09/08/18  0613 09/06/18  1550 09/06/18  1545   CULT Heavy growth  Anaerococcus species  No further identification  Susceptibility testing not routinely done  *  Culture in progress  Heavy growth  Staphylococcus aureus  *  Light growth  Enterococcus faecalis  Susceptibility testing in progress  * No growth Light growth  Peptoniphilus asaccharolyticus  Susceptibility testing not routinely done  *  Light growth  Clostridium species, not perfringens  Susceptibility testing not routinely done  *  Heavy growth  Finegoldia magna (Peptostreptococcus jennifer)  Susceptibility testing not routinely done  *  Heavy growth  Streptococcus dysgalactiae serogroup C/G  *  Moderate growth  Staphylococcus lugdunensis  *  Light growth  Streptococcus dysgalactiae serogroup C/G  Susceptibility testing done on previous specimen  *  Single colony  Coagulase negative Staphylococcus  Susceptibility testing not routinely done  These bacteria are part of normal skin michael, but  on occasion, may be true pathogens.    Clinical correlation must be applied to interpreting this microbiology result.  * No growth No growth No growth No growth Cultured on the 1st day of incubation:  Streptococcus agalactiae sero group B  *  Critical Value/Significant Value, preliminary result only, called to and read back by  Teresa John RN on RHMS5 @1055 on 9/7/18. .    (Note)  POSITIVE for STREPTOCOCCUS AGALACTIAE (Group B Strep) by [employer]  multiplex nucleic acid test. Penicillin and ampicillin are drugs of  choice, nonsusceptible isolates have not been reported. Final  identification and antimicrobial susceptibility testing will be  verified by standard methods.    Specimen tested with Brammoigene multiplex, gram-positive blood culture  nucleic acid test for the following targets: Staph aureus, Staph  epidermidis, Staph lugdunensis, other Staph species, Enterococcus  faecalis, Enterococcus faecium, Streptococcus species, S. agalactiae,  S. anginosus grp., S. pneumoniae, S. pyogenes, Listeria sp., mecA  (methicillin resistance) and Elisha/B (vancomycin resistance).    Critical Value/Significant Value called to and read back by Teresa John RN on 9.7.18 at 1340. bw     Cultured on the 1st day of incubation:  Streptococcus agalactiae sero group B  Susceptibility testing done on previous specimen  *  Critical Value/Significant Value, preliminary result only, called to and read back by  Ofe Fernandes RN on RHMS5 @1515 on 9/7/18. .

## 2019-03-15 NOTE — PLAN OF CARE
A&Ox4  Ind in room, partial weight bearing  PIV SL  Tolerating a Mod Cho diet  LS clear  BS active, +gas  Skin: Blotchy. Numbness/tingling in extremities. Ranjeet LE dressings d/t Right 4th toe amp and Left foot wound I&D. Orthosis shoes when up   & 224  Tx: Unasyn, Tylenol was refused  Will Cont to monitor per POC

## 2019-03-17 NOTE — PROGRESS NOTES
Transition Communication Hand-off for Care Transitions to Next Level of Care Provider    Name: Jayro Elder  : 1950  MRN #: [id number]  Primary Care Provider: Davion Cordova     Primary Clinic: [address] [facility] [address]     Reason for Hospitalization:  Cellulitis of right lower extremity [L03.115]  Admit Date/Time: 3/10/2019  7:50 PM  Discharge Date: 3/15/19  Payor Source: Payor: HUMANA / Plan: HUMANA MEDICARE ADVANTAGE / Product Type: Medicare /     Readmission Assessment Measure (ANDRIA) Risk Score/category: High           Reason for Communication Hand-off Referral: Fragility  Multiple providers/specialties    Discharge Plan:  Home     Concern for non-adherence with plan of care:   Yes, limited transportation  Discharge Needs Assessment:  Needs      Most Recent Value   Equipment Currently Used at Home  cane, straight, shower chair, walker, rolling, wheelchair, manual, glucometer          Already enrolled in Tele-monitoring program and name of program:  NA  Follow-up specialty is recommended: Yes    Follow-up plan:    Future Appointments   Date Time Provider Department Center   2019  7:30 AM RU LAB [facility]   2019  8:10 AM Gill Doty PA [facility]       Any outstanding tests or procedures:        Referrals     Future Labs/Procedures    Medication Therapy Management Referral     Comments:        This service is designed to help you get the most from your medications.  A specially trained pharmacist will work closely with you and your doctors  to solve any problems related to your medications and to help you get the   best results from taking them.      The Medication Therapy Management staff will call you to schedule an appointment.            Key Recommendations:  Pt is admitted with a right foot ulcer.  Pt said they were caused when [place] the [place] and then didn't know he developed blisters.  ANDRIA ELEVATED.  Pt used to follow with CONCEPCION Ricardo  Art with [facility].  However, he doesn't drive d/t his vision and he has neuropathy.  His wife provides transportation for him, but she works Monday-Friday 8:30-5pm.  She is suppose to retire at the end of May.  Pt's A1c was 11.7 as of 1/5/19.  Pt said before that time he had a different insurance and he hadn't been filling or taking his medications.  Pt now has a One Touch and takes his novolog and Lantus.  However, he said his blood glucose still has large ranges from 150-400.  He also said his strips are expensive.  He said [facility] is looking into it.  I consulted our pharmacy liaison and she said pt does have coverage for Accu Check glucometer and testing strips for three times a day for $17 count 100.  Pt requests that I order this and he did receive it.  He is scheduled with Dr Scott [facility] in [place] in June.  He doesn't want to move this appt up since he doesn't have transportation earlier.  He follows with cardiology for his CHF.  He weighs himself daily and monitors his salt intake.  Pt declines to reestablish care with primary care d/t his transportation issue. However, on day of discharge he decided that I can give a hand off to [facility] in case he is in need of resources.   He doesn't want the primary care clinic calling all the time checking up on him when he can't get to the clinic. Recommendations are for patient to follow up with Podiatry and was discharged home on oral Augmentin.         Rosy Morrow & Miracle Salvador    AVS/Discharge Summary is the source of truth; this is a helpful guide for improved communication of patient story

## 2019-03-18 ENCOUNTER — PATIENT OUTREACH (OUTPATIENT)
Dept: CARE COORDINATION | Facility: CLINIC | Age: 69
End: 2019-03-18

## 2019-03-18 NOTE — PROGRESS NOTES
Clinic Care Coordination Contact  OUTREACH    Referral Information:  Referral Source: IP Handoff    Primary Diagnosis: Diabetes    Chief Complaint   Patient presents with     Clinic Care Coordination - Post Hospital     DM        Universal Utilization: Patient was hospitalized at [facility] from 03/10 to 03/15 with a diagnosis of Diabetes  Clinic Utilization  Compliance Concerns: Yes  Utilization    Last refreshed: 3/18/2019  7:21 AM:  Hospital Admissions 4           Last refreshed: 3/18/2019  7:21 AM:  ED Visits 0           Last refreshed: 3/18/2019  7:21 AM:  No Show Count (past year) 2              Current as of: 3/18/2019  7:21 AM            Clinical Concerns:  Current Medical Concerns:  CHF, Diabetes.  Patient reports blood sugar was 180 this morning. Declines additional information or referral to diabetes education. Declines to schedule Primary Care Provider follow up appointment even when provided with clinic hours, and telephone visit option.   Current Behavioral Concerns: Patient is dismissive and not engaged during outreach, declines talking on the telephone.   Education Provided to patient: CC role, diabetes management, complications, diet, medications.      Health Maintenance Reviewed: Not assessed  Clinical Pathway: None    Medication Management:  Patient reports no concerns in managing his medications.      Diet/Exercise/Sleep:  Diet:: Diabetic diet  Inadequate nutrition (GOAL):: No  Food Insecurity: No  Tube Feeding: No    Transportation:  Transportation concerns (GOAL):: Yes  Transportation means:: Family     Resources and Interventions:  Current Resources:   Community Resources: [facility]  Supplies used at home:: Diabetic Supplies  Equipment Currently Used at Home: glucometer    Advance Care Plan/Directive  Advanced Care Plans/Directives on file:: No  Advanced Care Plan/Directive Status: Declined Further Information    Patient/Caregiver understanding: Patient verbalized understanding  and denies any additional questions or concerns at this time. RNCC engaged in AIDET communications during encounter.        Future Appointments              In 1 week Татьяна Mckeon DPM, Podiatry/Foot and Ankle Surgery [facility], [facility]    In 3 weeks RU LAB [facility]    In 3 weeks Gill Doty PA [facility]          Plan: RNCC will mail patient introduction letter and list of transportation resources. Patient was provided with writers contact information and encouraged to call writer with questions, concerns, support needs. Patient declines any additional care coordination support at this time. Podiatry follow up appointment has been scheduled. No further outreaches scheduled per patient request.     Leann Wolf RN Care Coordinator  [facility] - [facility] [place]  Email: [email or web address]  Phone: [phone]

## 2019-03-18 NOTE — LETTER
[place] CARE COORDINATION  [address]  [place] [address]    March 18, 2019    Jayro Elder  [address]  [place] [address]      Dear Jayro,    I am a clinic care coordinator who works with Davion Cordova PA-C at [facility]. I wanted to thank you for spending the time to talk with me.  I wanted to introduce myself and provide you with my contact information so that you can call me with questions or concerns about your health care. Below is a description of clinic care coordination and how I can further assist you.     The clinic care coordinator is a registered nurse and/or  who understand the health care system. The goal of clinic care coordination is to help you manage your health and improve access to the [facility] system in the most efficient manner. The registered nurse can assist you in meeting your health care goals by providing education, coordinating services, and strengthening the communication among your providers. The  can assist you with financial, behavioral, psychosocial, chemical dependency, counseling, and/or psychiatric resources.    Please feel free to contact me at [phone], with any questions or concerns. We at [facility] are focused on providing you with the highest-quality healthcare experience possible and that all starts with you.     Sincerely,     Leann Wolf

## 2019-03-19 ENCOUNTER — TELEPHONE (OUTPATIENT)
Dept: CARDIOLOGY | Facility: CLINIC | Age: 69
End: 2019-03-19

## 2019-03-19 ENCOUNTER — ALLIED HEALTH/NURSE VISIT (OUTPATIENT)
Dept: PHARMACY | Facility: CLINIC | Age: 69
End: 2019-03-19
Payer: COMMERCIAL

## 2019-03-19 DIAGNOSIS — I50.20 SYSTOLIC HEART FAILURE, UNSPECIFIED HF CHRONICITY (H): ICD-10-CM

## 2019-03-19 DIAGNOSIS — E11.8 TYPE 2 DIABETES MELLITUS WITH COMPLICATION, UNSPECIFIED WHETHER LONG TERM INSULIN USE: ICD-10-CM

## 2019-03-19 DIAGNOSIS — L03.115 CELLULITIS OF RIGHT LOWER EXTREMITY: ICD-10-CM

## 2019-03-19 DIAGNOSIS — Z78.9 TAKES DIETARY SUPPLEMENTS: ICD-10-CM

## 2019-03-19 DIAGNOSIS — E78.5 HYPERLIPIDEMIA, UNSPECIFIED HYPERLIPIDEMIA TYPE: ICD-10-CM

## 2019-03-19 DIAGNOSIS — I48.20 CHRONIC ATRIAL FIBRILLATION (H): Primary | ICD-10-CM

## 2019-03-19 DIAGNOSIS — E03.9 HYPOTHYROIDISM, UNSPECIFIED TYPE: ICD-10-CM

## 2019-03-19 LAB
BACTERIA SPEC CULT: ABNORMAL
BACTERIA SPEC CULT: ABNORMAL
Lab: ABNORMAL
SPECIMEN SOURCE: ABNORMAL

## 2019-03-19 PROCEDURE — 99605 MTMS BY PHARM NP 15 MIN: CPT | Performed by: PHARMACIST

## 2019-03-19 PROCEDURE — 99607 MTMS BY PHARM ADDL 15 MIN: CPT | Performed by: PHARMACIST

## 2019-03-19 NOTE — TELEPHONE ENCOUNTER
----- Message from CONCEPCION Dasilva sent at 3/19/2019  8:08 AM CDT -----  Regarding: add ASA  Please call pt and let him know I d/w Dr. Tomlin.   He was on vacation which is why it took so long for him to get back with me.  Now that he is off the plavix, he would like him to resume an 81mg asa in addition to his eliquis.  I updated his med list and addended my note, but please call pt and inform him.    Remind him to watch for any new bleeding etc.    Thanks  Gill

## 2019-03-19 NOTE — TELEPHONE ENCOUNTER
Called pt to let him know to resume his 81 mg of aspirin in addition to Eliquis. Left VM & asked pt for call back to confirm he received VM.  Princess Chapa RN 9:02 AM 03/19/19

## 2019-03-19 NOTE — PROGRESS NOTES
SUBJECTIVE/OBJECTIVE:                Jayro Elder is a 68 year old male called for a transitions of care visit.  He was discharged from [facility] on 3/15/19 for cellulitis/ toe amputation.     Chief Complaint: Transitions of care    Allergies/ADRs: None  Tobacco: No tobacco use, quit 12-14 years ago  Alcohol: not currently using  Caffeine: 2-3 cups/day of coffee    Medication Adherence/Access: no issues reported  Pt uses pill box, rarely misses medications.    Cellulitis/Toe amputation on right foot: Augmentin 875-125 mg for 14 days, no complaints of diarrhea or other side effects. Pt has not had his right foot unwrapped since discharge. He states that the hospital told him not to undress his right foot until he sees his doctor. Pt was told to follow up with his PCP this week and has an appointment to get stiches removed next week with the podiatrist. He does not want to schedule with PCP because he does not have a ride- wife needs to take time off every time he needs to be taken to an appointment.     Hyperlipidemia: Current therapy includes atorvastatin 40 mg daily, pt denies muscle aches and side effects.     Diabetes: Pt sees endocrinology. Pt is currently taking 26 units of Lantus, Novolog sliding scale + 8 units. Pt is not experiencing side effects.  SMBG: two times daily.   Ranges (patient reported): Mornings are around 100's and evenings up to 250's. Pt sees an endocrinologist - next appointment is in June. He reports he cannot see the endocrinologist earlier since he has to arrange rides to medical appointments.   ACEi/ARB: Yes: lisinopril.   Urine Albumin:   Lab Results   Component Value Date    UMALCR 49.68 (H) 03/12/2018      Aspirin: Not taking     Hemoglobin A1C   Date Value Ref Range Status   01/05/2019 11.7 (H) 0 - 5.6 % Final     Comment:     Normal <5.7% Prediabetes 5.7-6.4%  Diabetes 6.5% or higher - adopted from ADA   consensus guidelines.       Chronic Afib: Pt is taking Eliquis 5 mg  BID and denies excessive bleeding. He has been on Plavix in the past and experienced prolonged nosebleeds when taken aspirin. Pt hesitant to restart aspirin, his cardiologist told him to resume a daily low dose aspirin (81mg).     HFrEF: Current medications include carvedilol ER 25mg BID, lisinopril 5 mg  daily, furosemide 80 mg daily, Nitrostat prn, and isosorbide 30 mg daily.  Patient reports no current medication side effects.     ECHO:  Date 1/18/19, EF 30-45%  Pt is avoiding EtOH.    Other Supplemts: Vitamin D 2000 units daily, potassium 10 mEq BID. Pt denies symptoms of an upset stomach from potassium.    Hypothyroidism: Pt sees endocrinology. Current therapy includes levothyroxine 137 mcg daily. No issues reported.    TSH   Date Value Ref Range Status   09/06/2018 0.40 0.40 - 4.00 mU/L Final     GERD: Current medications include: Protonix (pantoprazole) 40 mg once daily. The patient does not have a history of GI bleed.    Today's Vitals: There were no vitals taken for this visit.     Last Comprehensive Metabolic Panel:  Sodium   Date Value Ref Range Status   03/14/2019 138 133 - 144 mmol/L Final     Potassium   Date Value Ref Range Status   03/14/2019 3.7 3.4 - 5.3 mmol/L Final     Chloride   Date Value Ref Range Status   03/14/2019 102 94 - 109 mmol/L Final     Carbon Dioxide   Date Value Ref Range Status   03/14/2019 30 20 - 32 mmol/L Final     Anion Gap   Date Value Ref Range Status   03/14/2019 6 3 - 14 mmol/L Final     Glucose   Date Value Ref Range Status   03/14/2019 152 (H) 70 - 99 mg/dL Final     Urea Nitrogen   Date Value Ref Range Status   03/14/2019 18 7 - 30 mg/dL Final     Creatinine   Date Value Ref Range Status   03/15/2019 1.29 (H) 0.66 - 1.25 mg/dL Final     GFR Estimate   Date Value Ref Range Status   03/15/2019 56 (L) >60 mL/min/[1.73_m2] Final     Comment:     Non  GFR Calc  Starting 12/18/2018, serum creatinine based estimated GFR (eGFR) will be   calculated using the  Chronic Kidney Disease Epidemiology Collaboration   (CKD-EPI) equation.       Calcium   Date Value Ref Range Status   03/14/2019 8.5 8.5 - 10.1 mg/dL Final         ASSESSMENT:                 Current medications were reviewed today.      Medication Adherence: good, no issues identified    Cellulitis/Toe amputation on right foot:: Stable. Pt to follow up with podiatrist within the next week as planned. Pt urged to schedule with PCP when possible.    Hyperlipidemia: Stable.     Diabetes: Needs improvement. The pt's last A1c from 1/5/19 was above goal of <7%. The pt is not currently controlled on his insulin regimen and is not scheduled to see his endocrinologist until June. Consider discussing blood sugars and potentially starting metformin XR at a future visit (the pt has tried IR metformin and did not tolerate GI side effects) if blood sugars are still not at goal next visit, as often blood sugars are out of range for 1 week post discharge.     Chronic Afib: Needs improvement. Pt does not want to restart a daily aspirin as prescribed by cardiology. Discussed speaking with his cardiologist about the safety/bleed risk of aspirin use with Eliquis.     HFrEF: Stable. BP near goal 130/80 on discharge, and often readings are elevated in the hospital due to stress. Continue to monitor.    Other Supplements: Stable.     Hypothyroidism: Stable. Pt's last TSH from 9/6/18 is within normal range.     GERD: Stable.     PLAN:              Post Discharge Medication Reconciliation Status: discharge medications reconciled, continue medications without change.    Future considerations: restart metformin for better blood glucose control.    Pt to...    1. Follow up with Dr. Doty about aspirin use with Eliquis vs aspirin with Plavix     2. Follow up with podiatrist to get stitches removed and try and arrange a ride to see PCP     I spent 40 minutes with this patient today. All changes were made via collaborative practice agreement  with Dr. Cordova. A copy of the visit note was provided to the patient's primary care provider.    Will follow up in 1 week    The patient was mailed a summary of these recommendations as an after visit summary.    Che Mckee, PharmD Student    Freddy FloodD, [facility]  Medication Therapy Management Provider  Phone: [phone]  [email or web address]

## 2019-03-22 ENCOUNTER — CARE COORDINATION (OUTPATIENT)
Dept: CARDIOLOGY | Facility: CLINIC | Age: 69
End: 2019-03-22

## 2019-03-22 NOTE — TELEPHONE ENCOUNTER
----- Message from CONCEPCION Dasilva sent at 3/21/2019  4:56 PM CDT -----  Regarding: no ASA?  Can you call pt and let him know I got a message he was hesitant to resume ASA 81mg?  Can you get a better idea why? Dr. Tomlin did recommend he be on it now that we are off Plavix.    Thanks  Gill

## 2019-03-22 NOTE — PROGRESS NOTES
"Called pt to inquire his hesitance re starting aspirin.     Pt sent Gill a message stating he was hesitant to resume ASA 81 mg.  Dr. Tomlin did recommend he be on it now that we are off Plavix.   Pt was seen by PCC on 3/19 and per note: \"Chronic Afib: Pt is taking Eliquis 5 mg BID and denies excessive bleeding. He has been on Plavix in the past and experienced prolonged nosebleeds when taken aspirin. Pt hesitant to restart aspirin, his cardiologist told him to resume a daily low dose aspirin (81mg).   Needs improvement. Pt does not want to restart a daily aspirin as prescribed by cardiology. Discussed speaking with his cardiologist about the safety/bleed risk of aspirin use with Eliquis. \"  Plan: 1. Follow up with Dr. Doty about aspirin use with Eliquis vs aspirin with Plavix     No answer when called pt's home and no VM box set up.   Called mobile number, left  for call back.   Princess Chapa RN 9:07 AM 03/22/19    Addendum 3/22/19 1013  Pt called back. Pt states he was told to stop taking aspirin last yr. Informed pt that recommended by Dr Tomlin to resume aspirin 81 mg daily in addition with Eliquis. Pt would like to talk to Gill at his next OV, 4/12 with BMP prior.   Princess Chapa RN 10:17 AM 03/22/19    "

## 2019-03-26 ENCOUNTER — ALLIED HEALTH/NURSE VISIT (OUTPATIENT)
Dept: PHARMACY | Facility: CLINIC | Age: 69
End: 2019-03-26
Payer: COMMERCIAL

## 2019-03-26 ENCOUNTER — OFFICE VISIT (OUTPATIENT)
Dept: PODIATRY | Facility: CLINIC | Age: 69
End: 2019-03-26
Payer: COMMERCIAL

## 2019-03-26 VITALS
BODY MASS INDEX: 29.1 KG/M2 | SYSTOLIC BLOOD PRESSURE: 118 MMHG | DIASTOLIC BLOOD PRESSURE: 62 MMHG | WEIGHT: 239 LBS | HEIGHT: 76 IN

## 2019-03-26 DIAGNOSIS — Z86.73 HISTORY OF CVA (CEREBROVASCULAR ACCIDENT): ICD-10-CM

## 2019-03-26 DIAGNOSIS — L03.115 CELLULITIS OF RIGHT LOWER EXTREMITY: ICD-10-CM

## 2019-03-26 DIAGNOSIS — I48.20 CHRONIC ATRIAL FIBRILLATION (H): Primary | ICD-10-CM

## 2019-03-26 DIAGNOSIS — Z98.890 POST-OPERATIVE STATE: ICD-10-CM

## 2019-03-26 DIAGNOSIS — E11.42 DIABETIC POLYNEUROPATHY ASSOCIATED WITH TYPE 2 DIABETES MELLITUS (H): Primary | ICD-10-CM

## 2019-03-26 DIAGNOSIS — Z89.421 H/O AMPUTATION OF LESSER TOE, RIGHT (H): ICD-10-CM

## 2019-03-26 DIAGNOSIS — L97.522 ULCER OF LEFT FOOT, WITH FAT LAYER EXPOSED (H): ICD-10-CM

## 2019-03-26 PROCEDURE — 99606 MTMS BY PHARM EST 15 MIN: CPT | Performed by: PHARMACIST

## 2019-03-26 PROCEDURE — 99024 POSTOP FOLLOW-UP VISIT: CPT | Performed by: PODIATRIST

## 2019-03-26 ASSESSMENT — MIFFLIN-ST. JEOR: SCORE: 1955.6

## 2019-03-26 NOTE — LETTER
"    3/26/2019         RE: Jayro Elder  [address] [place] [address]        Dear Colleague,    Thank you for referring your patient, Jayro Elder, to the [facility]. Please see a copy of my visit note below.    Podiatry / Foot and Ankle Surgery Progress Note    March 26, 2019    Subject: Patient was seen for follow up on right partial 4th toe amputation and left foot ulcer. Denies fever ,chills. Has been doing dressing changes with iodosorb to left foot ulcer.     Objective:  Vitals: /62   Ht 1.93 m (6' 4\")   Wt 108.4 kg (239 lb)   BMI 29.09 kg/m     BMI= Body mass index is 29.09 kg/m .    A1C: 11.7 (1/2019)    General:  Patient is alert and orientated.  NAD  Previous right 2nd and 4th toe partial amputations. Stitches intact to right 4th toe. No redness, dehiscence or signs of infection noted. Incision is not quite healed all the way. Left foot ulcer sub 1st metatarsal is almost healed. 0.2cm x 0.2cm x 0.1cm.      Imaging: right foot xray: - Postoperative changes of partial RIGHT second and fourth  toe amputations. No definite erosions are seen at this time.  Osteopenia, but no fractures.    Assessment:    Diabetic polyneuropathy associated with type 2 diabetes mellitus (H)  Post-operative state  H/O amputation of lesser toe, right (H)  Ulcer of left foot, with fat layer exposed (H)    Plan:  Dressings changed. Continue to dress left foot with iodosorb. Follow up with Dr. Bhagat in 1 week for reassessment. Was told to call with questions or concerns.     Татьяна Mckeon DPM, Podiatry/Foot and Ankle Surgery    Weight management plan: Patient was referred to their PCP to discuss a diet and exercise plan.    Recommended to Jayro Elder to follow up with Primary Care provider regarding elevated blood pressure.      Again, thank you for allowing me to participate in the care of your patient.        Sincerely,        Татьяна Mckeon DPM, Podiatry/Foot and " Ankle Surgery

## 2019-03-26 NOTE — PROGRESS NOTES
"SUBJECTIVE/OBJECTIVE:                Jayro Elder is a 68 year old male called for a follow-up visit for Medication Therapy Management.  He was referred to me from our transitions of care program.     Chief Complaint: Follow up from our visit on 3/19.  Pt has no questions or concerns.    Tobacco: History of tobacco dependence - quit 2005  Alcohol: not currently using    Medication Adherence/Access:  no issues reported    Cellulitis/Toe amputation on right foot: Augmentin 875-125 mg for 14 days, no complaints of diarrhea or other side effects. Pt met with podiatry this morning and reports it went well, no signs of infection.    Diabetes: Pt sees endocrinology. Pt is currently taking 26 units of Lantus, Novolog sliding scale + 8 units. Pt is not experiencing side effects.  SMBG: two times daily.   Ranges (patient reported): Mornings are around 100's and evenings up to 250's. Pt sees an endocrinologist - next appointment is in June. He reports he cannot see the endocrinologist earlier since he has to arrange rides to medical appointments. Historically, pt was on metformin IR but stopped due to GI side effects. He is not interested in a retrial because \"I've seen enough stuff on TV and read enough on the internet about how that stuff is bad for you.\"  ACEi/ARB: Yes: lisinopril.   Urine Albumin:         Lab Results   Component Value Date     UMALCR 49.68 (H) 03/12/2018      Aspirin: Not taking              Hemoglobin A1C   Date Value Ref Range Status   01/05/2019 11.7 (H) 0 - 5.6 % Final       Comment:       Normal <5.7% Prediabetes 5.7-6.4%  Diabetes 6.5% or higher - adopted from ADA   consensus guidelines.        AFib/CAD: Pt is taking Apixaban as directed and has not started aspirin yet, as he is still waiting to meet with cardiology in person prior to restarting.    Today's Vitals: There were no vitals taken for this visit.     Last Comprehensive Metabolic Panel:  Sodium   Date Value Ref Range Status   03/14/2019 " 138 133 - 144 mmol/L Final     Potassium   Date Value Ref Range Status   03/14/2019 3.7 3.4 - 5.3 mmol/L Final     Chloride   Date Value Ref Range Status   03/14/2019 102 94 - 109 mmol/L Final     Carbon Dioxide   Date Value Ref Range Status   03/14/2019 30 20 - 32 mmol/L Final     Anion Gap   Date Value Ref Range Status   03/14/2019 6 3 - 14 mmol/L Final     Glucose   Date Value Ref Range Status   03/14/2019 152 (H) 70 - 99 mg/dL Final     Urea Nitrogen   Date Value Ref Range Status   03/14/2019 18 7 - 30 mg/dL Final     Creatinine   Date Value Ref Range Status   03/15/2019 1.29 (H) 0.66 - 1.25 mg/dL Final     GFR Estimate   Date Value Ref Range Status   03/15/2019 56 (L) >60 mL/min/[1.73_m2] Final     Comment:     Non  GFR Calc  Starting 12/18/2018, serum creatinine based estimated GFR (eGFR) will be   calculated using the Chronic Kidney Disease Epidemiology Collaboration   (CKD-EPI) equation.       Calcium   Date Value Ref Range Status   03/14/2019 8.5 8.5 - 10.1 mg/dL Final     ASSESSMENT:              Current medications were reviewed today as discussed above.      Medication Adherence: fair, needs improvement - see below    Cellulitis/Toe amputation on right foot: Stable.    Diabetes:  Needs improvement.  A1c not at goal less than 7 and home blood sugars not at goal.  We discussed restarting metformin as a possible way to avoid further increase insulin patient is not interested at this time.  Patient to follow-up with endocrinology as scheduled.    AFib/CAD: Unimproved.  Patient encouraged to meet with cardiology as planned and ideally start aspirin at that time.     PLAN:                  1.  Patient to follow-up with cardiology or endocrinology as scheduled.    I spent 15 minutes with this patient today. I offer these suggestions with the understanding that I don't fully understand Jayro's past medical history and the complexity of his health conditions. Jayro should make no changes without  the approval of his physician. A copy of the visit note was provided to the patient's referring provider.     Will follow up on patient request, as he is not interested in ongoing visits at this time.    The patient was mailed a summary of these recommendations as an after visit summary.    Chart documentation was completed in part with Dragon voice-recognition software. Even though reviewed, some grammatical, spelling, and word errors may remain.    Lacey Ennis PharmD, [facility]  Medication Therapy Management Provider  Phone: [phone]  [email or web address]

## 2019-03-26 NOTE — PATIENT INSTRUCTIONS
"1 week follow up with Dr. Bhagat.    Thank you for choosing [facility]!    DR. ASENCIO'S CLINIC SCHEDULE  MONDAY AM - [place] TUESDAY - [place]   [address] [address] [address] [address] [place] [address]   [phone] / [phone] / [phone]       WEDNESDAY - [place] FRIDAY AM - [facility]   [address]   [place] [address] [place] [address]   [phone] / [phone]       FRIDAY PM - [place] SCHEDULE SURGERY: [phone]   [address] BILLING QUESTIONS: [phone]   [place] [address] AFTER HOURS: [phone] / [phone] APPOINTMENTS: [phone]     [employer] Price [employer] (CPL) [phone]       DIABETES AND YOUR FEET  Diabetes can result in several problems in the feet including ulcers (open sores) and amputations. Two of the most important reasons why people develop foot problems when they have diabetes is : 1. Neuropathy (loss of feeling)  2. Vascular disease (loss or decrease of blood flow).    Neuropathy is a term used to describe a loss of nerve function.  Patients with diabetes are at risk of developing neuropathy if their sugars continue to run high and are above the normal value. One theory for neuropathy is that the \"extra\" sugar in the body enters the nerves and is broken down. These by-products build up in the nerve causing it to swell and impairing nerve function. Often times, this can be prevented by controlling your sugars, dieting and exercise.    When a person develops neuropathy, they usually begin to feel numbness or tingling in their feet and sometime in their legs.  Other symptoms may include painful burning or hot feet, tingling or feeling like insects or ants are crawling on your feet or legs.  If the diabetes is sever and the sugars run high for long periods of time, neuropathy can also occur in the " hands.    Vascular disease  is a term used to describe a loss or decrease in circulation (blood flow). There is a problem in getting blood and oxygen to areas that need it. Similar to neuropathy, sugars can build up in the walls of the arteries (blood vessels) and cause them to become swollen, thickened and hardened. This decreases the amount of blood that can go to an area that needs it. Though this is common in the legs of diabetic patients, it can also affect other arteries (blood vessels) in the body such as in the heart and eyes.    In the legs, vascular disease usually results in cramping. Patients who develop leg cramps after walking the same distance every time (i.e. One block, half a mile, ect.) need to let their doctors know so that their circulation may be checked. Cramps causing severe pain in the feet and/or legs while sleeping and the cramps go away when you stand or hang your legs off the side of the bed, may also be a sign of poor blood circulation.  Occasional cramping in cold weather or on rare occasions with activity may not be due to poor circulation, but you should inform your doctor.    PREVENTION OF THESE DISEASES  The key to prevention is good blood sugar control. Poor blood sugar control is a big reason many of these problems start. Physical activity (exercise) is a very good way to help decrease your blood sugars. Exercise can lower your blood sugar, blood pressure, and cholesterol. It also reduces your risk for heart disease and stroke, relieves stress, and strengthens your heart, muscles and bones.  In addition, regular activity helps insulin work better, improves your blood circulation, and keeps your joints flexible. If you're trying to lose weight, a combination of exercise and wise food choices can help you reach your target weight and maintain it.      PAIN MANAGEMENT  1.Blood Sugar Control - Most important  2. Medications such as:  Amytriptylline, duloxetine, gabapentin, lyrica,  tramadol  3. Nutritional therapy:  Vitamin B6 (100mg daily), Vitamin B12 (75mcg daily), Vitamin D 2000 IU daily), Alpha-Lipoic Acid (600-1800mg daily), Acetyl-L-Carnitine (500-1000mg TID, L-methyl folate (1500mcg daily)    ** Metformin can block Vitamin B6 and B12 so it is important to supplement**    FOOT CARE RECOMMENDATIONS   1. Wash your feet with lukewarm water and a mild soap and then dry them thoroughly, especially between the toes.     2. Examine your feet daily looking for cuts, corns, blisters, cracks, ect, especially after wearing new shoes. Make sure to look between your toes. If you cannot see the bottom of your feet, set a mirror on the floor and hold your foot over it, or ask a spouse, friend or family member to examine your feet for you. Contact your doctor immediately if new problems are noted or if sores are not healing.     3. Immediately apply moisturizer to the tops and bottoms of your feet, avoiding areas between the toes. Hand lotion (Intesive Care, Lorin, Eucerin, Neutrogena, Curel, ect) is sufficient unless your doctor prescribes a medicated lotion. Apply sunscreen to your feet when going swimming outside.     4. Use clean comfortable shoes, wear white socks (if you have any bleeding or drainage, you will see it on white socks). Socks should not have thick seams or cut off the circulation around the leg. Break in new shoes slowly and rotate with older shoes until broken in. Check the inside of your shoes with your hand to look for areas of irritation or objects that may have fallen into your shoes.       5. Keep slippers by the side of your bed for use during the night.     6.  Shoes should be fitted by a professional and should not cause areas of irritation.  Check your feet regularly when wearing a new pair of shoes and replace them as needed.     7.  Talk to your doctor about proper exercise. Exercise and stretching stimulate blood flow to your feet and maintain proper glucose  levels.     8.  Monitor your blood glucose level as instructed by your doctor. Notify your doctor immediately if your blood sugar is abnormally high or low.    9. Cut your nails straight across, but then gently round any sharp edges with a cardboard nail file. If you have neuropathy, peripheral vascular disease or cannot see that well to trim your own toenails contact [facility] ([phone]) or [facility] ([phone]).      THINGS TO AVOID DOING   1.  Do not soak your feet if you have an open sore. Use only lukewarm water and always check the temperature with your hand as hot water can easily burn your feet.       2.  Never use a hot water bottle or heating pad on your feet. Also do not apply cold compresses to your feet. With decreased sensation, you could burn or freeze your feet.       3.  Do not apply any of these to your feet:    -  Over the counter medicine for corns or warts    -  Harsh chemicals like boric acid    -  Do not self-treat corns, cuts, blisters or infections. Always consult your doctor.       4.  Do not wear sandals, slippers or walk barefoot, especially on hot sand or concrete or other harsh surfaces.     5.  If you smoke, stop!!!            BODY WEIGHT AND YOUR FEET  The following information is included in the after visit summary for all patients. Body weight can be a sensitive issue to discuss in clinic, but we think the following information is very important. Although we focus on the feet and ankles, we do support the overall health of our patients.     Many things can cause foot and ankle problems. Foot structure, activity level, foot mechanics and injuries are common causes of pain. One very important issue that often goes unmentioned, is body weight. Extra weight can cause increased stress on muscles, ligaments, bones and tendons. Sometimes just a few extra pounds is all it takes to put one over her/his threshold. Without reducing that stress, it can be difficult to alleviate pain.  As Foot & Ankle specialists, our job is addressing the lower extremity problem and possible causes. Regarding extra body weight, we encourage patients to discuss diet and weight management plans with their primary care doctors. It is this team approach that gives you the best opportunity for pain relief and getting you back on your feet.      [facility] has a Comprehensive Weight Management Program. This program includes counseling, education, non-surgical and surgical approaches to weight loss. If you are interested in learning more either talk to you primary care provider or call [phone].

## 2019-03-26 NOTE — PROGRESS NOTES
"Podiatry / Foot and Ankle Surgery Progress Note    March 26, 2019    Subject: Patient was seen for follow up on right partial 4th toe amputation and left foot ulcer. Denies fever ,chills. Has been doing dressing changes with iodosorb to left foot ulcer.     Objective:  Vitals: /62   Ht 1.93 m (6' 4\")   Wt 108.4 kg (239 lb)   BMI 29.09 kg/m    BMI= Body mass index is 29.09 kg/m .    A1C: 11.7 (1/2019)    General:  Patient is alert and orientated.  NAD  Previous right 2nd and 4th toe partial amputations. Stitches intact to right 4th toe. No redness, dehiscence or signs of infection noted. Incision is not quite healed all the way. Left foot ulcer sub 1st metatarsal is almost healed. 0.2cm x 0.2cm x 0.1cm.      Imaging: right foot xray: - Postoperative changes of partial RIGHT second and fourth  toe amputations. No definite erosions are seen at this time.  Osteopenia, but no fractures.    Assessment:    Diabetic polyneuropathy associated with type 2 diabetes mellitus (H)  Post-operative state  H/O amputation of lesser toe, right (H)  Ulcer of left foot, with fat layer exposed (H)    Plan:  Dressings changed. Continue to dress left foot with iodosorb. Follow up with Dr. Bhagat in 1 week for reassessment. Was told to call with questions or concerns.     Татьяна Mckeon DPM, Podiatry/Foot and Ankle Surgery    Weight management plan: Patient was referred to their PCP to discuss a diet and exercise plan.    Recommended to Jayro Elder to follow up with Primary Care provider regarding elevated blood pressure.    "

## 2019-03-26 NOTE — PATIENT INSTRUCTIONS
Recommendations from today's MTM visit:                                                      1. Follow up with cardiology and endocrinology as scheduled. Call anytime with questions.    Next MTM visit: Call anytime with questions or to set up an appointment    To schedule another MTM appointment, please call the clinic directly or you may call the MTM scheduling line at [phone] or toll-free at [phone].     My Clinical Pharmacist's contact information:                                                      It was a pleasure talking with you today!  Please feel free to contact me with any questions or concerns you have.      Lacey Ennis, Boris, [facility]  Medication Therapy Management Provider  Phone: [phone]  [email or web address]    You may receive a survey about the MTM services you received by email and/or US Mail.  I would appreciate your feedback to help me serve you better in the future. Your comments will be anonymous.

## 2019-04-05 ENCOUNTER — OFFICE VISIT (OUTPATIENT)
Dept: PODIATRY | Facility: CLINIC | Age: 69
End: 2019-04-05
Payer: COMMERCIAL

## 2019-04-05 VITALS
HEIGHT: 76 IN | DIASTOLIC BLOOD PRESSURE: 80 MMHG | BODY MASS INDEX: 29.1 KG/M2 | WEIGHT: 239 LBS | SYSTOLIC BLOOD PRESSURE: 122 MMHG

## 2019-04-05 DIAGNOSIS — Z89.421 S/P AMPUTATION OF LESSER TOE, RIGHT (H): Primary | ICD-10-CM

## 2019-04-05 DIAGNOSIS — L84 CALLUS: ICD-10-CM

## 2019-04-05 PROCEDURE — 99024 POSTOP FOLLOW-UP VISIT: CPT | Performed by: PODIATRIST

## 2019-04-05 ASSESSMENT — MIFFLIN-ST. JEOR: SCORE: 1955.6

## 2019-04-05 NOTE — PATIENT INSTRUCTIONS
Thank you for choosing [facility]!    DR. LOO'S CLINIC LOCATIONS:   MONDAY - [place] TUESDAY - [place]   [address] [facility]  [address]   [place] [address] [place] [address]   [phone]       THURSDAY AM - [place] THURSDAY PM - [place]   [address]   [place] [address] [place] [address]   [phone]       FRIDAY AM - [place] SET UP SURGERY: [phone] [address] APPOINTMENTS: [phone]   [place] [address] BILLING QUESTIONS: [phone] FAX NUMBER: [phone]     CALLUS / CORN / IPK CARE  When there is excessive friction or pressure on the skin, the body responds by making the skin thicker to protect the deeper structures from becoming exposed. While this works well to protect the deeper structures, the thickened skin can cause increased pressure and pain.    Callus: flat, diffuse thickened areas are simple calluses and they are usually caused by friction. Often these are the result of rubbing on a shoe or from going barefoot.    Corns: calluses with a central core on or between the toes are called corns. These result from prominent joints on toes rubbing together. Theses are a symptom of bone malalignment or illfitting shoes and will always recur unless the underlying bones are addressed surgically.    IPK: calluses with a central core on the ball of the foot are usually IPKs (intractable plantar keratosis). These are caused by excessive pressure from the metatarsals, the bones that make up the ball of the foot. Often one of these bones is too long or too prominent. Again, these will always recur unless the underlying bone issue is addressed. There is no cure for these. They will either go away by themselves, recur, or more could develop.    TREATMENT RECOMENDATIONS  - File: Trim them down with a pumice stone or callus file a couple times a week to  remove callus tissue that builds up. An electric callus removing device. Amope Pedi Perfect Electronic Pedicure Foot File and Callus Remover can be a good option.   - Moisturize: Lotion can be applied to soften the callus. A lactic acid or urea based cream such as Carmol, Kersal or Vanicream or thicker cream with shea butter are good options.   - Foot Gear: Good supportive shoes and minimizing barefoot walking can slow down callus formation and can decrease pain levels. Gel inserts can also provide padding to the bottom of the foot to prevent pain and slow recurrence. Toe spacers, toe covers, can custom orthotic inserts can be beneficial as well.  - Surgery: If there is a surgical pathology noted, such as a prominent bone, often this needs to be addressed surgically to minimize recurrence. However, sometimes the lesion simply migrates to another spot after surgery, so it is not a guaranteed cure.                 BODY WEIGHT AND YOUR FEET  The following information is included in the after visit summary for all patients. Body weight can be a sensitive issue to discuss in clinic, but we think the following information is very important. Although we focus on the feet and ankles, we do support the overall health of our patients.     Many things can cause foot and ankle problems. Foot structure, activity level, foot mechanics and injuries are common causes of pain. One very important issue that often goes unmentioned, is body weight. Extra weight can cause increased stress on muscles, ligaments, bones and tendons. Sometimes just a few extra pounds is all it takes to put one over her/his threshold. Without reducing that stress, it can be difficult to alleviate pain. As Foot & Ankle specialists, our job is addressing the lower extremity problem and possible causes. Regarding extra body weight, we encourage patients to discuss diet and weight management plans with their primary care doctors. It is this team approach that  gives you the best opportunity for pain relief and getting you back on your feet.      [facility] has a Comprehensive Weight Management Program. This program includes counseling, education, non-surgical and surgical approaches to weight loss. If you are interested in learning more either talk to you primary care provider or call [phone].

## 2019-04-05 NOTE — PROGRESS NOTES
"Foot & Ankle Surgery  April 5, 2019    S:  Patient in today approx 3 1/2 sp R 4th toe amputation.  Pain levels zero.  surgicalshoe bilateral for left foot wound as well.  Some sutures removed by Dr Mckeon recently.  Finished PO abx.  Some concerns about an area on the R great toe    /80   Ht 1.93 m (6' 4\")   Wt 108.4 kg (239 lb)   BMI 29.09 kg/m        ROS - positive for CC.  Patient denies current nausea, vomiting, chills, fevers, belly pain, calf pain, chest pain or SOB.  Complete remainder of ROS is otherwise neg.    PE - sutures removed R 4th toe, open wound that is partially epithelialized measures 12 x 2 in greatest dimensions. Partial thickness, no SOI.  Some callusing sub 1st MPJ L and medial R hallux but no open wounds.  Skin shows no trophic, color or temperature changes otherwise.  No calf redness, swelling or pain noted otherwise.    A/P - 68 year old yo patient approx 3 1/2 weeks sp above procedure  -sutures removed R4th toe, Excisional debridement was performed, partial-thickness(limited to skin breakdown, no exposed subcutaneous fat), sharply debriding the wound, excising nonviable tissue to the above dimensions with a tissue nipper  -daily cares - wash/dry, abx ointment/bandaid, heel WB in surgical shoe  -regarding callus L 1st MPJ, We discussed that many calluses are caused by pressure or shearing forces on the skin, and these can often be treated sufficiently with shoes, inserts and local callus debridement.  Some calluses, however, can have a deep core, and while home treatments are helpful, occasional clinical debridement may be necessary.  In certain cases, surgical excision may be required if no other treatments have proven sufficient.  -Our home callus instruction handout was dispensed, detailing home therapies to minimize regrowth of the calluses.    -we discussed a rough estimate of the cost of callus debridement in clinic.  -regarding callus R hallux, debrided/removed with tissue " jenniffer, no charge.  Callus instructions for this as well      Follow up  -  2 weeks or sooner with acute issues      Body mass index is 29.09 kg/m .  Weight management plan: Patient was referred to their PCP to discuss a diet and exercise plan.      Ryan Bhagat DPM FACFAS FACFAOM  Podiatric Foot & Ankle Surgeon  [facility]  [phone]

## 2019-04-12 ENCOUNTER — OFFICE VISIT (OUTPATIENT)
Dept: CARDIOLOGY | Facility: CLINIC | Age: 69
End: 2019-04-12
Attending: PHYSICIAN ASSISTANT
Payer: COMMERCIAL

## 2019-04-12 VITALS
WEIGHT: 246.7 LBS | DIASTOLIC BLOOD PRESSURE: 66 MMHG | SYSTOLIC BLOOD PRESSURE: 100 MMHG | OXYGEN SATURATION: 96 % | BODY MASS INDEX: 30.04 KG/M2 | HEIGHT: 76 IN | HEART RATE: 82 BPM

## 2019-04-12 DIAGNOSIS — I50.23 ACUTE ON CHRONIC SYSTOLIC CONGESTIVE HEART FAILURE (H): ICD-10-CM

## 2019-04-12 DIAGNOSIS — I25.119 CORONARY ARTERY DISEASE INVOLVING NATIVE CORONARY ARTERY OF NATIVE HEART WITH ANGINA PECTORIS (H): Primary | ICD-10-CM

## 2019-04-12 LAB
ANION GAP SERPL CALCULATED.3IONS-SCNC: 5 MMOL/L (ref 3–14)
BUN SERPL-MCNC: 21 MG/DL (ref 7–30)
CALCIUM SERPL-MCNC: 8.9 MG/DL (ref 8.5–10.1)
CHLORIDE SERPL-SCNC: 103 MMOL/L (ref 94–109)
CO2 SERPL-SCNC: 29 MMOL/L (ref 20–32)
CREAT SERPL-MCNC: 1.18 MG/DL (ref 0.66–1.25)
GFR SERPL CREATININE-BSD FRML MDRD: 63 ML/MIN/{1.73_M2}
GLUCOSE SERPL-MCNC: 208 MG/DL (ref 70–99)
POTASSIUM SERPL-SCNC: 3.7 MMOL/L (ref 3.4–5.3)
SODIUM SERPL-SCNC: 137 MMOL/L (ref 133–144)

## 2019-04-12 PROCEDURE — 80048 BASIC METABOLIC PNL TOTAL CA: CPT | Performed by: PHYSICIAN ASSISTANT

## 2019-04-12 PROCEDURE — 99214 OFFICE O/P EST MOD 30 MIN: CPT | Mod: 24 | Performed by: PHYSICIAN ASSISTANT

## 2019-04-12 PROCEDURE — 36415 COLL VENOUS BLD VENIPUNCTURE: CPT | Performed by: INTERNAL MEDICINE

## 2019-04-12 ASSESSMENT — MIFFLIN-ST. JEOR: SCORE: 1990.52

## 2019-04-12 NOTE — PATIENT INSTRUCTIONS
Call [facility] nurse for any questions or concerns Mon-Fri 8am-4pm: [phone] For concerns after hours: [phone]    Medication changes:   Restart aspirin 81mg once daily.    Plan from today:  Return in 3 months to see Gill in [facility] with labs.

## 2019-04-12 NOTE — LETTER
4/12/2019    Physician No Ref-Primary  No address on file    RE: Jayro Elder       Dear Colleague,    I had the pleasure of seeing Jayro Elder in the [facility].      Cardiology Progress Note    Date of Service: 04/12/2019      Reason for visit: CORE clinic follow up, chronic systolic heart failure.    Primary cardiologist: Dr. Justin Tomlin      HPI:  Mr. Elder is a pleasant 68 year old male previously followed by Dr. Mckeon, with a PMHx including DM2, CVA, untreated MARISOL, and renal dysfunction. From a cardiac standpoint, he has chronic afib (on AC), and known coronary disease with previous MI/stenting and resultant ischemic CMO with chronic EF of 35-40%. He has problems with peripheral wounds from his diabetes. He was admitted in early Jan 2019 for osteomyelitis. This stay was complicated by ABNER with IV antibiotics and his long term clopidogrel was stopped. He presented back shortly after, with weight gain and ANDREWS. He was again hospitalized and treated for a CHF exacerbation. He was not on diuresis prior to that admission. During that stay he was diuresed ~30 pounds. Echo showed no new WMAs and his EF remained in the 35-40% range.     He then saw Dr. Tomlin for a CORE MD evaluation in early Feb 2019. At that time he was felt to be overdiuresed and his lasix was transiently held. He was supposed to resume at 40mg daily, but he misunderstood and continued on 80mg daily. Despite this error, he has done relatively well since that time. Given intermittent lower BP, I held his amlodipine at subsequent follow up, which improved his dizziness. He renal function had worsened secondary to antibiotic use due to his gangrene and his lisinopril was transiently held. We resumed this last visit. He's back today for follow up.    Since last visit, he was hospitalized from 3/10 to 3/15 for right foot cellulitis and right toe osteomyelitis. He again required IV abx and underwent partial toe  amputation as well as I+D of his left foot ulcer. Fortunately, it appears that he did not have an exacerbation of his heart failure, and his atrial fibrillation remained under good control. His anticoagulation was held briefly but he has since resumed. He tells me he is feeling well from a cardiovascular standpoint with no worsening ANDREWS or chest discomfort, though admittedly he isn't very active due to the foot issues. He has not had any further dizziness. His labs today actually show some improvement in his renal function from prior. It is noted that in the past he has declined referral to nephrology mostly due to cost/insurance issues. Unfortunately, his blood sugar control remains poor, but again, due to cost/insurance issues he does not plan to return to see his endocrinologist until June.       ASSESSMENT/PLAN:    1.  Chronic chronic systolic heart failure, ischemic cardiomyopathy.   --Known chronic EF 35-40%, which remained unchanged per last echo Jan 2019.     --Baseline weight is a bit difficult to sort out, but he appears euvolemic on exam today. He remains on 80mg daily of Lasix which appears to be controlling any edema, and his renal function is stable.  I encouraged continued sodium restriction, though I'm not convinced he is monitoring this at home.    --Continue lisinopril 5mg daily, and carvedilol 25mg BID.    --I again discussed how ongoing poor DM control can contribute to cardiac dysfunction. I've asked him to call Dr. Lua's office and provide him an update on his home sugars.     2. Chronic atrial fibrillation.   --Appears chronically rate controlled, asymptomatic. On carvedilol as above.   --Continue Eliquis, 5mg BID, which other than some bruising, he is tolerating well. This was held briefly with his recent foot surgery.     3. Coronary artery disease.   --No ischemic symptoms currently. Last intervention June 2017 involving an obtuse marginal with stent placement. Other stents were widely  patent at that time.    --He is now no longer on clopidogrel, which was stopped earlier this year. I had d/w Dr. Tomlin previously, who recommends we add back ASA at 81mg daily. He declined this initially, but is agreeable today after discussing during our visit. He will watch for any new bleeding.    --Continue atorvastatin 40mg at bedtime. Last LDL under excellent control at 33 Jan 2019.    --He is chronically on Imdur, I believe due to the chronic chest discomfort. Will continue without change as clinically he is doing well.      Follow up plan:  Return to [facility] in ~3 months. Pt requests less frequent visits due to transportation issues and cost.         Orders this Visit:  Orders Placed This Encounter   Procedures     Basic metabolic panel     N terminal pro BNP outpatient     Hemoglobin     Follow-Up with [facility] - [facility] visit     Orders Placed This Encounter   Medications     aspirin (ASA) 81 MG EC tablet     Sig: Take 1 tablet (81 mg) by mouth daily     There are no discontinued medications.        CURRENT MEDICATIONS:  Current Outpatient Medications   Medication Sig Dispense Refill     apixaban ANTICOAGULANT (ELIQUIS) 5 MG tablet Take 1 tablet (5 mg) by mouth 2 times daily       aspirin (ASA) 81 MG EC tablet Take 1 tablet (81 mg) by mouth daily       atorvastatin (LIPITOR) 40 MG tablet Take 1 tablet (40 mg) by mouth every evening 90 tablet 3     carvedilol (COREG) 25 MG tablet Take 1 tablet (25 mg) by mouth 2 times daily (with meals) 180 tablet 3     Cholecalciferol (VITAMIN D3) 2000 units TABS Take 1 tablet by mouth daily       furosemide (LASIX) 80 MG tablet Take 1 tablet (80 mg) by mouth daily 90 tablet 1     Insulin Aspart (NOVOLOG SC) Inject 6-8 Units Subcutaneous 3 times daily (with meals) Plus Sliding Scale.  Also, per pt: sometimes uses Insulin Aspart at night if BG is high.       insulin glargine (LANTUS SOLOSTAR PEN) 100 UNIT/ML pen Inject 26 Units Subcutaneous every morning (Patient taking  differently: Inject 36 Units Subcutaneous every morning )       insulin pen needle 31G X 6 MM Use  as directed. 100 each prn     isosorbide mononitrate (IMDUR) 30 MG 24 hr tablet Take 1 tablet (30 mg) by mouth daily 90 tablet 1     levothyroxine (SYNTHROID, LEVOTHROID) 137 MCG tablet Take 137 mcg by mouth At Bedtime  90 tablet 3     lisinopril (PRINIVIL/ZESTRIL) 5 MG tablet Take 1 tablet (5 mg) by mouth daily 90 tablet 3     order for DME Equipment being ordered: Other: surgical shoe male XL  Treatment Diagnosis: sp right 2nd toe amputaion 1 Device 0     order for DME Equipment being ordered: Walker Wheels () and Walker ()  Treatment Diagnosis: Impaired gait, heel WB bilaterally. 1 each 0     pantoprazole (PROTONIX) 40 MG enteric coated tablet Take 1 tablet (40 mg) by mouth every morning (before breakfast) 30 tablet 0     potassium chloride ER (K-DUR/KLOR-CON M) 10 MEQ CR tablet Take 1 tablet (10 mEq) by mouth 2 times daily 60 tablet 2     aspirin (ASA) 81 MG tablet Take 1 tablet (81 mg) by mouth daily (Patient not taking: Reported on 3/19/2019)       nitroglycerin (NITROSTAT) 0.4 MG sublingual tablet Place 1 tablet (0.4 mg) under the tongue every 5 minutes as needed for chest pain (Patient not taking: Reported on 4/12/2019) 50 tablet 0       ALLERGIES     Allergies   Allergen Reactions     No Known Allergies        PAST MEDICAL HISTORY:  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     Cardiomyopathy (H)      Cellulitis of left lower extremity 3/13/2018     Cerebral infarction (H)     eye sight decreased in peripheral of right side and blurry     Diabetic ulcer of left midfoot associated with type 2 diabetes mellitus, with fat layer exposed (H) 3/13/2018     Essential hypertension, benign 11/13/2002     Generalized osteoarthrosis, unspecified site 11/13/2002      Microalbuminuria 3/13/2018    X1     Myocardial infarction (H)      Neuropathy      Sepsis (H)      Sleep apnea     doesn't use it     Substance abuse (H)      Syncope, unspecified syncope type 3/13/2018     Thyroid disease     takes medicine     Tobacco use disorder 11/13/2002     Type 2 diabetes mellitus with diabetic peripheral angiopathy without gangrene, unspecified long term insulin use status 2005       PAST SURGICAL HISTORY:  Past Surgical History:   Procedure Laterality Date     AMPUTATE TOE(S) Right 1/6/2019    Procedure: Right open second toe partial amputation;  Surgeon: Sabino Garcia DPM;  Location: [facility] OR     AMPUTATE TOE(S) Right 1/8/2019    Procedure: 1.  Revision right second toe amputation with resection of distal half of proximal phalanx with full closure.    2.  Debridement of callus/preulcerative lesion, distal left second toe and plantar left first metatarsophalangeal joint.;  Surgeon: Ryan Bhagat DPM;  Location: [facility] OR     AMPUTATE TOE(S) Right 3/12/2019    Procedure: Partial right fourth toe amputation.;  Surgeon: Ryan Bhagat DPM;  Location: [facility] OR     ANGIOGRAM  6/29/05    subtotal occ.mid LAD,SUSAN mid LAD     ANGIOGRAM  7/05    mild CAD,patent stent,no flow-limiting lesions,sev.LV dysf.LAD enlarged     ANGIOGRAM  2/09    Sev.single vessel disease,Mod LV dysf.distal LAD 90%,70-75% mid lad just before prev stent,SUSAN to prox.mid LAD, endeavor to distal LAD     ANGIOGRAM  11/13/13    restenosis, stent LAD     BACK SURGERY      back surgery x3     C NONSPECIFIC PROCEDURE      Laminectomy x 3 - (1983 x 2 & 1990)     C NONSPECIFIC PROCEDURE Bilateral 1998    Bunionectomy     C NONSPECIFIC PROCEDURE  1959    Gingival surgery at age 9     HEART CATH LEFT HEART CATH  06/13/2017    SUSAN to OM3     INCISION AND DRAINAGE TOE, COMBINED Bilateral 9/11/2018    Procedure: COMBINED INCISION AND DRAINAGE TOE;  1) Irrigation and debridement ulcer left great toe  2) Amputation 3rd toe right  foot at distal interphalangeal joint level;  Surgeon: Miki Harp MD;  Location: [facility] OR     IRRIGATION AND DEBRIDEMENT FOOT, COMBINED Left 3/12/2019    Procedure: Debridement of left foot ulcer sub first MPJ 2 x 2.5 cm down to and including subcutaneous tissue, callus debridement for preulcerative lesion, left second toe.;  Surgeon: Ryan Bhagat DPM;  Location: [facility] OR     ORTHOPEDIC SURGERY      bunion surgery both feet     ORTHOPEDIC SURGERY      left shoulder     SOFT TISSUE SURGERY      debridement of toe numerous time     VASCULAR SURGERY      7 stents in heart       FAMILY HISTORY:  Family History   Problem Relation Age of Onset     Cancer - colorectal Sister      Thyroid Disease Brother      Cardiovascular Brother      Diabetes Brother      Cancer Father         tumor in chest and throat     Arthritis Mother      Thyroid Disease Mother      Diabetes Mother      Glaucoma No family hx of      Macular Degeneration No family hx of        SOCIAL HISTORY:  Social History     Socioeconomic History     Marital status:      Spouse name: None     Number of children: None     Years of education: None     Highest education level: None   Occupational History     None   Social Needs     Financial resource strain: None     Food insecurity:     Worry: None     Inability: None     Transportation needs:     Medical: None     Non-medical: None   Tobacco Use     Smoking status: Former Smoker     Packs/day: 1.00     Years: 25.00     Pack years: 25.00     Types: Cigarettes     Last attempt to quit: 2005     Years since quittin.7     Smokeless tobacco: Never Used   Substance and Sexual Activity     Alcohol use: No     Alcohol/week: 2.4 oz     Types: 4 Shots of liquor per week     Frequency: Never     Comment: almost every day     Drug use: No     Sexual activity: Yes     Partners: Female   Lifestyle     Physical activity:     Days per week: None     Minutes per session: None     Stress: None  "  Relationships     Social connections:     Talks on phone: None     Gets together: None     Attends Church service: None     Active member of club or organization: None     Attends meetings of clubs or organizations: None     Relationship status: None     Intimate partner violence:     Fear of current or ex partner: None     Emotionally abused: None     Physically abused: None     Forced sexual activity: None   Other Topics Concern     Parent/sibling w/ CABG, MI or angioplasty before 65F 55M? Yes      Service Not Asked     Blood Transfusions Not Asked     Caffeine Concern No     Comment: 3 cups daily     Occupational Exposure Not Asked     Hobby Hazards Not Asked     Sleep Concern Not Asked     Stress Concern Not Asked     Weight Concern Not Asked     Special Diet Yes     Comment: watching carb intake     Back Care Not Asked     Exercise No     Comment: some walking     Bike Helmet Not Asked     Seat Belt Not Asked     Self-Exams Not Asked   Social History Narrative     None       Review of Systems:  Cardiovascular: neg chest pain, palpitations, orthopnea, LE edema  Constitutional: negative for chills, sweats, fevers.   Resp: Negative for dyspnea at rest, dyspnea on exertion, cough, known chronic lung disease  HEENT: Negative for new visual changes, neg new headaches.  Gastrointestinal: negative for abdominal pain, diarrhea, nausea, vomiting  Hematologic/lymphatic: pos for current systemic anticoagulation, hx of CVA   Musculoskeletal: negative for new back pain, joint pain. Pos foot pain.  Neurological: negative for focal weakness, LOC, seizures, syncope/presyncope.      Physical Exam:  Vitals: /66 (BP Location: Right arm, Patient Position: Chair, Cuff Size: Adult Regular)   Pulse 82   Ht 1.93 m (6' 4\")   Wt 111.9 kg (246 lb 11.2 oz)   SpO2 96%   BMI 30.03 kg/m      Wt Readings from Last 4 Encounters:   04/12/19 111.9 kg (246 lb 11.2 oz)   04/05/19 108.4 kg (239 lb)   03/26/19 108.4 kg (239 " lb)   03/15/19 108.6 kg (239 lb 6.4 oz)       GEN:  In general, this is a well nourished  male in no acute distress on room air.  Patient ambulatory, unaccompanied today.  HEENT:  Pupils equal, round. Sclerae nonicteric.   NECK: Supple, no masses appreciated. Trachea midline. No JVD.   C/V:  Irregular rhythm, No murmur, rub or gallop.   RESP: Respirations are unlabored. No use of accessory muscles. Clear to auscultation bilaterally without wheezing, rales, or rhonchi.  GI: Abdomen soft, nontender, nondistended.   EXTREM:  No ankle edema today. No cyanosis or clubbing.  NEURO: Alert and oriented, cooperative. Gait not formally assessed. No obvious focal deficits.   PSYCH: Somewhat flat affect.   SKIN: Warm and dry. No rashes or petechiae appreciated.       Recent Lab Results:  LIPID RESULTS:  Lab Results   Component Value Date    CHOL 81 01/18/2019    HDL 32 (L) 01/18/2019    LDL 33 01/18/2019    TRIG 83 01/18/2019       BMP RESULTS:  Lab Results   Component Value Date     04/12/2019    POTASSIUM 3.7 04/12/2019    CHLORIDE 103 04/12/2019    CO2 29 04/12/2019    ANIONGAP 5 04/12/2019     (H) 04/12/2019    BUN 21 04/12/2019    CR 1.18 04/12/2019    GFRESTIMATED 63 04/12/2019    GFRESTBLACK 73 04/12/2019    BETTIE 8.9 04/12/2019        New/Pertinent imaging results since last visit:  Echo 1/18/19  Interpretation Summary  Moderately (EF 35-40%) reduced left ventricular function is present. Regional  wall abnormality involving the septum, apex and inferior walls. No LV apical  thrombus.  Global right ventricular function is normal. The right ventricle is normal  size.  The inferior vena cava was dilated at 2.6 cm without respiratory variability,  consistent with increased right atrial pressure.  Estimated mean right atrial pressure is 15 mmHg (significantly elevated).  No significant valvular dysfunction.  No pericardial effusion is present.  Rhythm was atrial fibrillation during the study.     This  study was compared with the study from 9/8/2018 .  IVC doesnot collapse in this study. RA pressure is more elevated. RWA is  similar.        CONCEPCION Dasilva-JORJE  [facility]  Pager [phone]          Thank you for allowing me to participate in the care of your patient.      Sincerely,     CONCEPCION aDsilva     [facility]    cc:   CONCEPCION Dasilva  [facility] [address] [address]

## 2019-04-12 NOTE — PROGRESS NOTES
Cardiology Progress Note    Date of Service: 04/12/2019      Reason for visit: CORE clinic follow up, chronic systolic heart failure.    Primary cardiologist: Dr. Justin Tomlin      HPI:  Mr. Elder is a pleasant 68 year old male previously followed by Dr. Mckeon, with a PMHx including DM2, CVA, untreated MARISOL, and renal dysfunction. From a cardiac standpoint, he has chronic afib (on AC), and known coronary disease with previous MI/stenting and resultant ischemic CMO with chronic EF of 35-40%. He has problems with peripheral wounds from his diabetes. He was admitted in early Jan 2019 for osteomyelitis. This stay was complicated by ABNER with IV antibiotics and his long term clopidogrel was stopped. He presented back shortly after, with weight gain and ANDREWS. He was again hospitalized and treated for a CHF exacerbation. He was not on diuresis prior to that admission. During that stay he was diuresed ~30 pounds. Echo showed no new WMAs and his EF remained in the 35-40% range.     He then saw Dr. Tomlin for a CORE MD evaluation in early Feb 2019. At that time he was felt to be overdiuresed and his lasix was transiently held. He was supposed to resume at 40mg daily, but he misunderstood and continued on 80mg daily. Despite this error, he has done relatively well since that time. Given intermittent lower BP, I held his amlodipine at subsequent follow up, which improved his dizziness. He renal function had worsened secondary to antibiotic use due to his gangrene and his lisinopril was transiently held. We resumed this last visit. He's back today for follow up.    Since last visit, he was hospitalized from 3/10 to 3/15 for right foot cellulitis and right toe osteomyelitis. He again required IV abx and underwent partial toe amputation as well as I+D of his left foot ulcer. Fortunately, it appears that he did not have an exacerbation of his heart failure, and his atrial fibrillation remained under good control. His  anticoagulation was held briefly but he has since resumed. He tells me he is feeling well from a cardiovascular standpoint with no worsening ANDREWS or chest discomfort, though admittedly he isn't very active due to the foot issues. He has not had any further dizziness. His labs today actually show some improvement in his renal function from prior. It is noted that in the past he has declined referral to nephrology mostly due to cost/insurance issues. Unfortunately, his blood sugar control remains poor, but again, due to cost/insurance issues he does not plan to return to see his endocrinologist until June.       ASSESSMENT/PLAN:    1.  Chronic chronic systolic heart failure, ischemic cardiomyopathy.   --Known chronic EF 35-40%, which remained unchanged per last echo Jan 2019.     --Baseline weight is a bit difficult to sort out, but he appears euvolemic on exam today. He remains on 80mg daily of Lasix which appears to be controlling any edema, and his renal function is stable.  I encouraged continued sodium restriction, though I'm not convinced he is monitoring this at home.    --Continue lisinopril 5mg daily, and carvedilol 25mg BID.    --I again discussed how ongoing poor DM control can contribute to cardiac dysfunction. I've asked him to call Dr. Lua's office and provide him an update on his home sugars.     2. Chronic atrial fibrillation.   --Appears chronically rate controlled, asymptomatic. On carvedilol as above.   --Continue Eliquis, 5mg BID, which other than some bruising, he is tolerating well. This was held briefly with his recent foot surgery.     3. Coronary artery disease.   --No ischemic symptoms currently. Last intervention June 2017 involving an obtuse marginal with stent placement. Other stents were widely patent at that time.    --He is now no longer on clopidogrel, which was stopped earlier this year. I had d/w Dr. Tomlin previously, who recommends we add back ASA at 81mg daily. He declined this  initially, but is agreeable today after discussing during our visit. He will watch for any new bleeding.    --Continue atorvastatin 40mg at bedtime. Last LDL under excellent control at 33 Jan 2019.    --He is chronically on Imdur, I believe due to the chronic chest discomfort. Will continue without change as clinically he is doing well.      Follow up plan:  Return to [facility] in ~3 months. Pt requests less frequent visits due to transportation issues and cost.         Orders this Visit:  Orders Placed This Encounter   Procedures     Basic metabolic panel     N terminal pro BNP outpatient     Hemoglobin     Follow-Up with [facility] - [place] visit     Orders Placed This Encounter   Medications     aspirin (ASA) 81 MG EC tablet     Sig: Take 1 tablet (81 mg) by mouth daily     There are no discontinued medications.        CURRENT MEDICATIONS:  Current Outpatient Medications   Medication Sig Dispense Refill     apixaban ANTICOAGULANT (ELIQUIS) 5 MG tablet Take 1 tablet (5 mg) by mouth 2 times daily       aspirin (ASA) 81 MG EC tablet Take 1 tablet (81 mg) by mouth daily       atorvastatin (LIPITOR) 40 MG tablet Take 1 tablet (40 mg) by mouth every evening 90 tablet 3     carvedilol (COREG) 25 MG tablet Take 1 tablet (25 mg) by mouth 2 times daily (with meals) 180 tablet 3     Cholecalciferol (VITAMIN D3) 2000 units TABS Take 1 tablet by mouth daily       furosemide (LASIX) 80 MG tablet Take 1 tablet (80 mg) by mouth daily 90 tablet 1     Insulin Aspart (NOVOLOG SC) Inject 6-8 Units Subcutaneous 3 times daily (with meals) Plus Sliding Scale.  Also, per pt: sometimes uses Insulin Aspart at night if BG is high.       insulin glargine (LANTUS SOLOSTAR PEN) 100 UNIT/ML pen Inject 26 Units Subcutaneous every morning (Patient taking differently: Inject 36 Units Subcutaneous every morning )       insulin pen needle 31G X 6 MM Use  as directed. 100 each prn     isosorbide mononitrate (IMDUR) 30 MG 24 hr tablet Take 1 tablet (30 mg)  by mouth daily 90 tablet 1     levothyroxine (SYNTHROID, LEVOTHROID) 137 MCG tablet Take 137 mcg by mouth At Bedtime  90 tablet 3     lisinopril (PRINIVIL/ZESTRIL) 5 MG tablet Take 1 tablet (5 mg) by mouth daily 90 tablet 3     order for DME Equipment being ordered: Other: surgical shoe male XL  Treatment Diagnosis: sp right 2nd toe amputaion 1 Device 0     order for DME Equipment being ordered: Walker Wheels () and Walker ()  Treatment Diagnosis: Impaired gait, heel WB bilaterally. 1 each 0     pantoprazole (PROTONIX) 40 MG enteric coated tablet Take 1 tablet (40 mg) by mouth every morning (before breakfast) 30 tablet 0     potassium chloride ER (K-DUR/KLOR-CON M) 10 MEQ CR tablet Take 1 tablet (10 mEq) by mouth 2 times daily 60 tablet 2     aspirin (ASA) 81 MG tablet Take 1 tablet (81 mg) by mouth daily (Patient not taking: Reported on 3/19/2019)       nitroglycerin (NITROSTAT) 0.4 MG sublingual tablet Place 1 tablet (0.4 mg) under the tongue every 5 minutes as needed for chest pain (Patient not taking: Reported on 4/12/2019) 50 tablet 0       ALLERGIES     Allergies   Allergen Reactions     No Known Allergies        PAST MEDICAL HISTORY:  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     Cardiomyopathy (H)      Cellulitis of left lower extremity 3/13/2018     Cerebral infarction (H)     eye sight decreased in peripheral of right side and blurry     Diabetic ulcer of left midfoot associated with type 2 diabetes mellitus, with fat layer exposed (H) 3/13/2018     Essential hypertension, benign 11/13/2002     Generalized osteoarthrosis, unspecified site 11/13/2002     Microalbuminuria 3/13/2018    X1     Myocardial infarction (H)      Neuropathy      Sepsis (H)      Sleep apnea     doesn't use it     Substance abuse (H)      Syncope, unspecified syncope type 3/13/2018      Thyroid disease     takes medicine     Tobacco use disorder 11/13/2002     Type 2 diabetes mellitus with diabetic peripheral angiopathy without gangrene, unspecified long term insulin use status 2005       PAST SURGICAL HISTORY:  Past Surgical History:   Procedure Laterality Date     AMPUTATE TOE(S) Right 1/6/2019    Procedure: Right open second toe partial amputation;  Surgeon: Sabino Garcia DPM;  Location: [facility] OR     AMPUTATE TOE(S) Right 1/8/2019    Procedure: 1.  Revision right second toe amputation with resection of distal half of proximal phalanx with full closure.    2.  Debridement of callus/preulcerative lesion, distal left second toe and plantar left first metatarsophalangeal joint.;  Surgeon: Ryan Bhagat DPM;  Location: [facility] OR     AMPUTATE TOE(S) Right 3/12/2019    Procedure: Partial right fourth toe amputation.;  Surgeon: Ryan Bhagat DPM;  Location: [facility] OR     ANGIOGRAM  6/29/05    subtotal occ.mid LAD,SUSAN mid LAD     ANGIOGRAM  7/05    mild CAD,patent stent,no flow-limiting lesions,sev.LV dysf.LAD enlarged     ANGIOGRAM  2/09    Sev.single vessel disease,Mod LV dysf.distal LAD 90%,70-75% mid lad just before prev stent,SUSAN to prox.mid LAD, endeavor to distal LAD     ANGIOGRAM  11/13/13    restenosis, stent LAD     BACK SURGERY      back surgery x3     C NONSPECIFIC PROCEDURE      Laminectomy x 3 - (1983 x 2 & 1990)     C NONSPECIFIC PROCEDURE Bilateral 1998    Bunionectomy     C NONSPECIFIC PROCEDURE  1959    Gingival surgery at age 9     HEART CATH LEFT HEART CATH  06/13/2017    SUSAN to OM3     INCISION AND DRAINAGE TOE, COMBINED Bilateral 9/11/2018    Procedure: COMBINED INCISION AND DRAINAGE TOE;  1) Irrigation and debridement ulcer left great toe  2) Amputation 3rd toe right foot at distal interphalangeal joint level;  Surgeon: Miki Harp MD;  Location: [facility] OR     IRRIGATION AND DEBRIDEMENT FOOT, COMBINED Left 3/12/2019    Procedure: Debridement of left foot ulcer sub  first MPJ 2 x 2.5 cm down to and including subcutaneous tissue, callus debridement for preulcerative lesion, left second toe.;  Surgeon: Ryan Bhagat DPM;  Location: [facility] OR     ORTHOPEDIC SURGERY      bunion surgery both feet     ORTHOPEDIC SURGERY      left shoulder     SOFT TISSUE SURGERY      debridement of toe numerous time     VASCULAR SURGERY      7 stents in heart       FAMILY HISTORY:  Family History   Problem Relation Age of Onset     Cancer - colorectal Sister      Thyroid Disease Brother      Cardiovascular Brother      Diabetes Brother      Cancer Father         tumor in chest and throat     Arthritis Mother      Thyroid Disease Mother      Diabetes Mother      Glaucoma No family hx of      Macular Degeneration No family hx of        SOCIAL HISTORY:  Social History     Socioeconomic History     Marital status:      Spouse name: None     Number of children: None     Years of education: None     Highest education level: None   Occupational History     None   Social Needs     Financial resource strain: None     Food insecurity:     Worry: None     Inability: None     Transportation needs:     Medical: None     Non-medical: None   Tobacco Use     Smoking status: Former Smoker     Packs/day: 1.00     Years: 25.00     Pack years: 25.00     Types: Cigarettes     Last attempt to quit: 2005     Years since quittin.7     Smokeless tobacco: Never Used   Substance and Sexual Activity     Alcohol use: No     Alcohol/week: 2.4 oz     Types: 4 Shots of liquor per week     Frequency: Never     Comment: almost every day     Drug use: No     Sexual activity: Yes     Partners: Female   Lifestyle     Physical activity:     Days per week: None     Minutes per session: None     Stress: None   Relationships     Social connections:     Talks on phone: None     Gets together: None     Attends [employer] service: None     Active member of club or organization: None     Attends meetings of clubs or  "organizations: None     Relationship status: None     Intimate partner violence:     Fear of current or ex partner: None     Emotionally abused: None     Physically abused: None     Forced sexual activity: None   Other Topics Concern     Parent/sibling w/ CABG, MI or angioplasty before 65F 55M? Yes      Service Not Asked     Blood Transfusions Not Asked     Caffeine Concern No     Comment: 3 cups daily     Occupational Exposure Not Asked     Hobby Hazards Not Asked     Sleep Concern Not Asked     Stress Concern Not Asked     Weight Concern Not Asked     Special Diet Yes     Comment: watching carb intake     Back Care Not Asked     Exercise No     Comment: some walking     Bike Helmet Not Asked     Seat Belt Not Asked     Self-Exams Not Asked   Social History Narrative     None       Review of Systems:  Cardiovascular: neg chest pain, palpitations, orthopnea, LE edema  Constitutional: negative for chills, sweats, fevers.   Resp: Negative for dyspnea at rest, dyspnea on exertion, cough, known chronic lung disease  HEENT: Negative for new visual changes, neg new headaches.  Gastrointestinal: negative for abdominal pain, diarrhea, nausea, vomiting  Hematologic/lymphatic: pos for current systemic anticoagulation, hx of CVA   Musculoskeletal: negative for new back pain, joint pain. Pos foot pain.  Neurological: negative for focal weakness, LOC, seizures, syncope/presyncope.      Physical Exam:  Vitals: /66 (BP Location: Right arm, Patient Position: Chair, Cuff Size: Adult Regular)   Pulse 82   Ht 1.93 m (6' 4\")   Wt 111.9 kg (246 lb 11.2 oz)   SpO2 96%   BMI 30.03 kg/m     Wt Readings from Last 4 Encounters:   04/12/19 111.9 kg (246 lb 11.2 oz)   04/05/19 108.4 kg (239 lb)   03/26/19 108.4 kg (239 lb)   03/15/19 108.6 kg (239 lb 6.4 oz)       GEN:  In general, this is a well nourished  male in no acute distress on room air.  Patient ambulatory, unaccompanied today.  HEENT:  Pupils equal, round. " Sclerae nonicteric.   NECK: Supple, no masses appreciated. Trachea midline. No JVD.   C/V:  Irregular rhythm, No murmur, rub or gallop.   RESP: Respirations are unlabored. No use of accessory muscles. Clear to auscultation bilaterally without wheezing, rales, or rhonchi.  GI: Abdomen soft, nontender, nondistended.   EXTREM:  No ankle edema today. No cyanosis or clubbing.  NEURO: Alert and oriented, cooperative. Gait not formally assessed. No obvious focal deficits.   PSYCH: Somewhat flat affect.   SKIN: Warm and dry. No rashes or petechiae appreciated.       Recent Lab Results:  LIPID RESULTS:  Lab Results   Component Value Date    CHOL 81 01/18/2019    HDL 32 (L) 01/18/2019    LDL 33 01/18/2019    TRIG 83 01/18/2019       BMP RESULTS:  Lab Results   Component Value Date     04/12/2019    POTASSIUM 3.7 04/12/2019    CHLORIDE 103 04/12/2019    CO2 29 04/12/2019    ANIONGAP 5 04/12/2019     (H) 04/12/2019    BUN 21 04/12/2019    CR 1.18 04/12/2019    GFRESTIMATED 63 04/12/2019    GFRESTBLACK 73 04/12/2019    BETTIE 8.9 04/12/2019        New/Pertinent imaging results since last visit:  Echo 1/18/19  Interpretation Summary  Moderately (EF 35-40%) reduced left ventricular function is present. Regional  wall abnormality involving the septum, apex and inferior walls. No LV apical  thrombus.  Global right ventricular function is normal. The right ventricle is normal  size.  The inferior vena cava was dilated at 2.6 cm without respiratory variability,  consistent with increased right atrial pressure.  Estimated mean right atrial pressure is 15 mmHg (significantly elevated).  No significant valvular dysfunction.  No pericardial effusion is present.  Rhythm was atrial fibrillation during the study.     This study was compared with the study from 9/8/2018 .  IVC doesnot collapse in this study. RA pressure is more elevated. RWA is  similar.        Gill Doty PA-C  [facility]  Pager [phone]

## 2019-04-23 ENCOUNTER — OFFICE VISIT (OUTPATIENT)
Dept: PODIATRY | Facility: CLINIC | Age: 69
End: 2019-04-23
Payer: COMMERCIAL

## 2019-04-23 VITALS
SYSTOLIC BLOOD PRESSURE: 110 MMHG | DIASTOLIC BLOOD PRESSURE: 68 MMHG | WEIGHT: 240 LBS | BODY MASS INDEX: 29.22 KG/M2 | HEIGHT: 76 IN

## 2019-04-23 DIAGNOSIS — E11.42 DIABETIC POLYNEUROPATHY ASSOCIATED WITH TYPE 2 DIABETES MELLITUS (H): ICD-10-CM

## 2019-04-23 DIAGNOSIS — Z89.421 S/P AMPUTATION OF LESSER TOE, RIGHT (H): Primary | ICD-10-CM

## 2019-04-23 PROCEDURE — 11042 DBRDMT SUBQ TIS 1ST 20SQCM/<: CPT | Mod: 58 | Performed by: PODIATRIST

## 2019-04-23 PROCEDURE — 99024 POSTOP FOLLOW-UP VISIT: CPT | Performed by: PODIATRIST

## 2019-04-23 ASSESSMENT — MIFFLIN-ST. JEOR: SCORE: 1960.13

## 2019-04-23 NOTE — PATIENT INSTRUCTIONS
Thank you for choosing [facility]!    DR. LOO'S CLINIC LOCATIONS:   MONDAY - [place] TUESDAY - [place]   [address] [facility]  [address] [facility] [address]   [place] [address] [place] [address]   [phone]       THURSDAY AM - [place] THURSDAY PM - [place]   [address]   [place] [address] [place], [place] [address] [phone]       FRIDAY AM - [place] SET UP SURGERY: [phone] [address] APPOINTMENTS: [phone]   [place] [address] BILLING QUESTIONS: [phone] FAX NUMBER: [phone]     Follow Up:     BODY WEIGHT AND YOUR FEET  The following information is included in the after visit summary for all patients. Body weight can be a sensitive issue to discuss in clinic, but we think the following information is very important. Although we focus on the feet and ankles, we do support the overall health of our patients.     Many things can cause foot and ankle problems. Foot structure, activity level, foot mechanics and injuries are common causes of pain. One very important issue that often goes unmentioned, is body weight. Extra weight can cause increased stress on muscles, ligaments, bones and tendons. Sometimes just a few extra pounds is all it takes to put one over her/his threshold. Without reducing that stress, it can be difficult to alleviate pain. As Foot & Ankle specialists, our job is addressing the lower extremity problem and possible causes. Regarding extra body weight, we encourage patients to discuss diet and weight management plans with their primary care doctors. It is this team approach that gives you the best opportunity for pain relief and getting you back on your feet.      [facility] has a Comprehensive Weight Management Program. This program includes counseling, education, non-surgical and surgical approaches to weight loss. If you are interested in learning more  either talk to you primary care provider or call [phone].

## 2019-04-23 NOTE — PROGRESS NOTES
"Foot & Ankle Surgery  April 23, 2019    S:  Patient in today approx 5 1/2 weeks sp R 5th toe amputation.  Pain levels minimal.  He's been treating it \"some\" with Aquafor.  He also has areas of breakdown medial R hallux, plantar medial L 1st MPJ and distal 2nd and 3rd toes left lower extremity.      /68   Ht 1.93 m (6' 4\")   Wt 108.9 kg (240 lb)   BMI 29.21 kg/m        ROS - positive for CC.  Patient denies current nausea, vomiting, chills, fevers, belly pain, calf pain, chest pain or SOB.  Complete remainder of ROS is otherwise neg.    PE - R 4th toe amputation site is full-thickness but down to 4 x 1 1/2mm, no SOI, no exposed bone.  Pre-ulcerative lesoins medial R hallux, plantar-medial L 1st MPJ and distal L 2nd and 3rd toes without underlying wounds.  Skin shows no trophic, color or temperature changes otherwise.  No calf redness, swelling or pain noted otherwise.      A/P - 68 year old yo patient approx 5 1/2 weeks sp above procedure; pre-ulcerative lesions multiple areas bilateral lower extremity   -Excisional debridement was performed, full-thickness, sharply debriding the wound down to and including exposed sub-cutaneous tissue/fat, excising nonviable tissue to the above dimensions with a tissue nipper to R 4th toe amputation site  -no indication for PO abx  -continue daily cares - wash/dry, abx ointment/bandaid    2.  Pre-ulcerative lesions bilateral lower extremity   -tight glycemic control is necessary; last A1c 1/5/19 was 11.7  -debrided callus tissue x 4 with tissue nipper to evaluate for uderlying wounds and none were found; no charge  -continue pumice therapies to all pre-ulcerative lesions, to remove callus tissue and minimize wound formation  -advised close monitoring of all areas, twice daily      Follow up  -  2 weeks or sooner with acute issues    Body mass index is 29.21 kg/m .  Weight management plan: Patient was referred to their PCP to discuss a diet and exercise plan.      Ryan PIERCE" BRICE Bhagat FACFAS FACFAOM  Podiatric Foot & Ankle Surgeon  [facility]  [phone]

## 2019-05-02 DIAGNOSIS — I50.21 ACUTE SYSTOLIC HEART FAILURE (H): ICD-10-CM

## 2019-05-02 RX ORDER — POTASSIUM CHLORIDE 750 MG/1
10 TABLET, EXTENDED RELEASE ORAL 2 TIMES DAILY
Qty: 180 TABLET | Refills: 0 | Status: ON HOLD | OUTPATIENT
Start: 2019-05-02 | End: 2019-07-15

## 2019-05-05 ENCOUNTER — APPOINTMENT (OUTPATIENT)
Dept: CT IMAGING | Facility: CLINIC | Age: 69
DRG: 617 | End: 2019-05-05
Attending: EMERGENCY MEDICINE
Payer: COMMERCIAL

## 2019-05-05 ENCOUNTER — HOSPITAL ENCOUNTER (INPATIENT)
Facility: CLINIC | Age: 69
LOS: 2 days | Discharge: HOME | DRG: 617 | End: 2019-05-07
Attending: EMERGENCY MEDICINE | Admitting: INTERNAL MEDICINE
Payer: COMMERCIAL

## 2019-05-05 DIAGNOSIS — E11.65 TYPE 2 DIABETES MELLITUS WITH HYPERGLYCEMIA, UNSPECIFIED WHETHER LONG TERM INSULIN USE (H): ICD-10-CM

## 2019-05-05 DIAGNOSIS — M86.171 OTHER ACUTE OSTEOMYELITIS OF RIGHT FOOT (H): ICD-10-CM

## 2019-05-05 DIAGNOSIS — L03.031 CELLULITIS OF TOE OF RIGHT FOOT: Primary | ICD-10-CM

## 2019-05-05 DIAGNOSIS — R68.83 CHILLS: ICD-10-CM

## 2019-05-05 PROBLEM — M86.10 ACUTE OSTEOMYELITIS (H): Status: ACTIVE | Noted: 2019-05-05

## 2019-05-05 LAB
ANION GAP SERPL CALCULATED.3IONS-SCNC: 3 MMOL/L (ref 3–14)
BASOPHILS # BLD AUTO: 0 10E9/L (ref 0–0.2)
BASOPHILS NFR BLD AUTO: 0.3 %
BUN SERPL-MCNC: 22 MG/DL (ref 7–30)
CALCIUM SERPL-MCNC: 8.6 MG/DL (ref 8.5–10.1)
CHLORIDE SERPL-SCNC: 102 MMOL/L (ref 94–109)
CO2 SERPL-SCNC: 32 MMOL/L (ref 20–32)
CREAT BLD-MCNC: 1.4 MG/DL (ref 0.66–1.25)
CREAT SERPL-MCNC: 1.28 MG/DL (ref 0.66–1.25)
DIFFERENTIAL METHOD BLD: ABNORMAL
EOSINOPHIL # BLD AUTO: 0.2 10E9/L (ref 0–0.7)
EOSINOPHIL NFR BLD AUTO: 1.7 %
ERYTHROCYTE [DISTWIDTH] IN BLOOD BY AUTOMATED COUNT: 13.1 % (ref 10–15)
GFR SERPL CREATININE-BSD FRML MDRD: 50 ML/MIN/{1.73_M2}
GFR SERPL CREATININE-BSD FRML MDRD: 57 ML/MIN/{1.73_M2}
GLUCOSE BLDC GLUCOMTR-MCNC: 131 MG/DL (ref 70–99)
GLUCOSE BLDC GLUCOMTR-MCNC: 131 MG/DL (ref 70–99)
GLUCOSE BLDC GLUCOMTR-MCNC: 194 MG/DL (ref 70–99)
GLUCOSE BLDC GLUCOMTR-MCNC: 216 MG/DL (ref 70–99)
GLUCOSE SERPL-MCNC: 212 MG/DL (ref 70–99)
HBA1C MFR BLD: 9 % (ref 0–5.6)
HCT VFR BLD AUTO: 35.9 % (ref 40–53)
HGB BLD-MCNC: 11.7 G/DL (ref 13.3–17.7)
IMM GRANULOCYTES # BLD: 0 10E9/L (ref 0–0.4)
IMM GRANULOCYTES NFR BLD: 0.3 %
INR PPP: 1.34 (ref 0.86–1.14)
LACTATE BLD-SCNC: 1.8 MMOL/L (ref 0.7–2)
LYMPHOCYTES # BLD AUTO: 1.1 10E9/L (ref 0.8–5.3)
LYMPHOCYTES NFR BLD AUTO: 11.5 %
MCH RBC QN AUTO: 29 PG (ref 26.5–33)
MCHC RBC AUTO-ENTMCNC: 32.6 G/DL (ref 31.5–36.5)
MCV RBC AUTO: 89 FL (ref 78–100)
MONOCYTES # BLD AUTO: 0.8 10E9/L (ref 0–1.3)
MONOCYTES NFR BLD AUTO: 8.7 %
NEUTROPHILS # BLD AUTO: 7.1 10E9/L (ref 1.6–8.3)
NEUTROPHILS NFR BLD AUTO: 77.5 %
NRBC # BLD AUTO: 0 10*3/UL
NRBC BLD AUTO-RTO: 0 /100
PLATELET # BLD AUTO: 223 10E9/L (ref 150–450)
POTASSIUM SERPL-SCNC: 3.9 MMOL/L (ref 3.4–5.3)
RBC # BLD AUTO: 4.04 10E12/L (ref 4.4–5.9)
SODIUM SERPL-SCNC: 137 MMOL/L (ref 133–144)
WBC # BLD AUTO: 9.2 10E9/L (ref 4–11)

## 2019-05-05 PROCEDURE — 25000128 H RX IP 250 OP 636: Performed by: EMERGENCY MEDICINE

## 2019-05-05 PROCEDURE — 99223 1ST HOSP IP/OBS HIGH 75: CPT | Mod: AI | Performed by: INTERNAL MEDICINE

## 2019-05-05 PROCEDURE — 96365 THER/PROPH/DIAG IV INF INIT: CPT | Mod: 59

## 2019-05-05 PROCEDURE — 83036 HEMOGLOBIN GLYCOSYLATED A1C: CPT | Performed by: EMERGENCY MEDICINE

## 2019-05-05 PROCEDURE — 80048 BASIC METABOLIC PNL TOTAL CA: CPT | Performed by: EMERGENCY MEDICINE

## 2019-05-05 PROCEDURE — 25800030 ZZH RX IP 258 OP 636: Performed by: EMERGENCY MEDICINE

## 2019-05-05 PROCEDURE — 25000131 ZZH RX MED GY IP 250 OP 636 PS 637: Performed by: INTERNAL MEDICINE

## 2019-05-05 PROCEDURE — 85025 COMPLETE CBC W/AUTO DIFF WBC: CPT | Performed by: EMERGENCY MEDICINE

## 2019-05-05 PROCEDURE — 83605 ASSAY OF LACTIC ACID: CPT | Performed by: EMERGENCY MEDICINE

## 2019-05-05 PROCEDURE — 82565 ASSAY OF CREATININE: CPT

## 2019-05-05 PROCEDURE — 25800030 ZZH RX IP 258 OP 636: Performed by: INTERNAL MEDICINE

## 2019-05-05 PROCEDURE — 11042 DBRDMT SUBQ TIS 1ST 20SQCM/<: CPT | Mod: 78 | Performed by: PODIATRIST

## 2019-05-05 PROCEDURE — 96367 TX/PROPH/DG ADDL SEQ IV INF: CPT

## 2019-05-05 PROCEDURE — 25000132 ZZH RX MED GY IP 250 OP 250 PS 637: Performed by: INTERNAL MEDICINE

## 2019-05-05 PROCEDURE — 25000128 H RX IP 250 OP 636: Performed by: INTERNAL MEDICINE

## 2019-05-05 PROCEDURE — 00000146 ZZHCL STATISTIC GLUCOSE BY METER IP

## 2019-05-05 PROCEDURE — 99223 1ST HOSP IP/OBS HIGH 75: CPT | Mod: 24 | Performed by: PODIATRIST

## 2019-05-05 PROCEDURE — 12000000 ZZH R&B MED SURG/OB

## 2019-05-05 PROCEDURE — 99285 EMERGENCY DEPT VISIT HI MDM: CPT | Mod: 25

## 2019-05-05 PROCEDURE — 36415 COLL VENOUS BLD VENIPUNCTURE: CPT | Performed by: EMERGENCY MEDICINE

## 2019-05-05 PROCEDURE — 73701 CT LOWER EXTREMITY W/DYE: CPT | Mod: RT

## 2019-05-05 PROCEDURE — 85610 PROTHROMBIN TIME: CPT | Performed by: EMERGENCY MEDICINE

## 2019-05-05 PROCEDURE — 87040 BLOOD CULTURE FOR BACTERIA: CPT | Performed by: EMERGENCY MEDICINE

## 2019-05-05 RX ORDER — METOCLOPRAMIDE 5 MG/1
5 TABLET ORAL EVERY 6 HOURS PRN
Status: DISCONTINUED | OUTPATIENT
Start: 2019-05-05 | End: 2019-05-07 | Stop reason: HOSPADM

## 2019-05-05 RX ORDER — PROCHLORPERAZINE 25 MG
12.5 SUPPOSITORY, RECTAL RECTAL EVERY 12 HOURS PRN
Status: DISCONTINUED | OUTPATIENT
Start: 2019-05-05 | End: 2019-05-07 | Stop reason: HOSPADM

## 2019-05-05 RX ORDER — POTASSIUM CHLORIDE 750 MG/1
10 TABLET, EXTENDED RELEASE ORAL 2 TIMES DAILY
Status: DISCONTINUED | OUTPATIENT
Start: 2019-05-05 | End: 2019-05-07 | Stop reason: HOSPADM

## 2019-05-05 RX ORDER — ISOSORBIDE MONONITRATE 30 MG/1
30 TABLET, EXTENDED RELEASE ORAL DAILY
Status: DISCONTINUED | OUTPATIENT
Start: 2019-05-05 | End: 2019-05-07 | Stop reason: HOSPADM

## 2019-05-05 RX ORDER — POTASSIUM CHLORIDE 1500 MG/1
20-40 TABLET, EXTENDED RELEASE ORAL
Status: DISCONTINUED | OUTPATIENT
Start: 2019-05-05 | End: 2019-05-07 | Stop reason: HOSPADM

## 2019-05-05 RX ORDER — POTASSIUM CL/LIDO/0.9 % NACL 10MEQ/0.1L
10 INTRAVENOUS SOLUTION, PIGGYBACK (ML) INTRAVENOUS
Status: DISCONTINUED | OUTPATIENT
Start: 2019-05-05 | End: 2019-05-07 | Stop reason: HOSPADM

## 2019-05-05 RX ORDER — SODIUM CHLORIDE 9 MG/ML
INJECTION, SOLUTION INTRAVENOUS CONTINUOUS
Status: DISCONTINUED | OUTPATIENT
Start: 2019-05-05 | End: 2019-05-07 | Stop reason: HOSPADM

## 2019-05-05 RX ORDER — CARVEDILOL 25 MG/1
25 TABLET ORAL 2 TIMES DAILY WITH MEALS
Status: DISCONTINUED | OUTPATIENT
Start: 2019-05-05 | End: 2019-05-07 | Stop reason: HOSPADM

## 2019-05-05 RX ORDER — CEFAZOLIN SODIUM 1 G/50ML
20 SOLUTION INTRAVENOUS ONCE
Status: COMPLETED | OUTPATIENT
Start: 2019-05-05 | End: 2019-05-05

## 2019-05-05 RX ORDER — LIDOCAINE 40 MG/G
CREAM TOPICAL
Status: DISCONTINUED | OUTPATIENT
Start: 2019-05-05 | End: 2019-05-06

## 2019-05-05 RX ORDER — ONDANSETRON 2 MG/ML
4 INJECTION INTRAMUSCULAR; INTRAVENOUS EVERY 6 HOURS PRN
Status: DISCONTINUED | OUTPATIENT
Start: 2019-05-05 | End: 2019-05-07 | Stop reason: HOSPADM

## 2019-05-05 RX ORDER — AMPICILLIN AND SULBACTAM 2; 1 G/1; G/1
3 INJECTION, POWDER, FOR SOLUTION INTRAMUSCULAR; INTRAVENOUS ONCE
Status: COMPLETED | OUTPATIENT
Start: 2019-05-05 | End: 2019-05-05

## 2019-05-05 RX ORDER — DEXTROSE MONOHYDRATE 25 G/50ML
25-50 INJECTION, SOLUTION INTRAVENOUS
Status: DISCONTINUED | OUTPATIENT
Start: 2019-05-05 | End: 2019-05-07 | Stop reason: HOSPADM

## 2019-05-05 RX ORDER — POTASSIUM CHLORIDE 29.8 MG/ML
20 INJECTION INTRAVENOUS
Status: DISCONTINUED | OUTPATIENT
Start: 2019-05-05 | End: 2019-05-07 | Stop reason: HOSPADM

## 2019-05-05 RX ORDER — PROCHLORPERAZINE MALEATE 5 MG
5 TABLET ORAL EVERY 6 HOURS PRN
Status: DISCONTINUED | OUTPATIENT
Start: 2019-05-05 | End: 2019-05-07 | Stop reason: HOSPADM

## 2019-05-05 RX ORDER — NALOXONE HYDROCHLORIDE 0.4 MG/ML
.1-.4 INJECTION, SOLUTION INTRAMUSCULAR; INTRAVENOUS; SUBCUTANEOUS
Status: DISCONTINUED | OUTPATIENT
Start: 2019-05-05 | End: 2019-05-06

## 2019-05-05 RX ORDER — VANCOMYCIN HYDROCHLORIDE 1 G/200ML
1000 INJECTION, SOLUTION INTRAVENOUS ONCE
Status: DISCONTINUED | OUTPATIENT
Start: 2019-05-05 | End: 2019-05-05

## 2019-05-05 RX ORDER — POLYETHYLENE GLYCOL 3350 17 G/17G
17 POWDER, FOR SOLUTION ORAL DAILY PRN
Status: DISCONTINUED | OUTPATIENT
Start: 2019-05-05 | End: 2019-05-07 | Stop reason: HOSPADM

## 2019-05-05 RX ORDER — ALUMINA, MAGNESIA, AND SIMETHICONE 2400; 2400; 240 MG/30ML; MG/30ML; MG/30ML
30 SUSPENSION ORAL EVERY 4 HOURS PRN
Status: DISCONTINUED | OUTPATIENT
Start: 2019-05-05 | End: 2019-05-07 | Stop reason: HOSPADM

## 2019-05-05 RX ORDER — IOPAMIDOL 755 MG/ML
500 INJECTION, SOLUTION INTRAVASCULAR ONCE
Status: COMPLETED | OUTPATIENT
Start: 2019-05-05 | End: 2019-05-05

## 2019-05-05 RX ORDER — PANTOPRAZOLE SODIUM 40 MG/1
40 TABLET, DELAYED RELEASE ORAL
Status: DISCONTINUED | OUTPATIENT
Start: 2019-05-05 | End: 2019-05-07 | Stop reason: HOSPADM

## 2019-05-05 RX ORDER — HYDROMORPHONE HYDROCHLORIDE 1 MG/ML
.3-.5 INJECTION, SOLUTION INTRAMUSCULAR; INTRAVENOUS; SUBCUTANEOUS
Status: DISCONTINUED | OUTPATIENT
Start: 2019-05-05 | End: 2019-05-07 | Stop reason: HOSPADM

## 2019-05-05 RX ORDER — METOCLOPRAMIDE HYDROCHLORIDE 5 MG/ML
5 INJECTION INTRAMUSCULAR; INTRAVENOUS EVERY 6 HOURS PRN
Status: DISCONTINUED | OUTPATIENT
Start: 2019-05-05 | End: 2019-05-07 | Stop reason: HOSPADM

## 2019-05-05 RX ORDER — ONDANSETRON 4 MG/1
4 TABLET, ORALLY DISINTEGRATING ORAL EVERY 6 HOURS PRN
Status: DISCONTINUED | OUTPATIENT
Start: 2019-05-05 | End: 2019-05-07 | Stop reason: HOSPADM

## 2019-05-05 RX ORDER — ATORVASTATIN CALCIUM 40 MG/1
40 TABLET, FILM COATED ORAL EVERY EVENING
Status: DISCONTINUED | OUTPATIENT
Start: 2019-05-05 | End: 2019-05-07 | Stop reason: HOSPADM

## 2019-05-05 RX ORDER — POTASSIUM CHLORIDE 7.45 MG/ML
10 INJECTION INTRAVENOUS
Status: DISCONTINUED | OUTPATIENT
Start: 2019-05-05 | End: 2019-05-07 | Stop reason: HOSPADM

## 2019-05-05 RX ORDER — LISINOPRIL 5 MG/1
5 TABLET ORAL DAILY
Status: DISCONTINUED | OUTPATIENT
Start: 2019-05-05 | End: 2019-05-07 | Stop reason: HOSPADM

## 2019-05-05 RX ORDER — OXYCODONE HYDROCHLORIDE 5 MG/1
5-10 TABLET ORAL
Status: DISCONTINUED | OUTPATIENT
Start: 2019-05-05 | End: 2019-05-07 | Stop reason: HOSPADM

## 2019-05-05 RX ORDER — POTASSIUM CHLORIDE 1.5 G/1.58G
20-40 POWDER, FOR SOLUTION ORAL
Status: DISCONTINUED | OUTPATIENT
Start: 2019-05-05 | End: 2019-05-07 | Stop reason: HOSPADM

## 2019-05-05 RX ORDER — FUROSEMIDE 40 MG
80 TABLET ORAL DAILY
Status: DISCONTINUED | OUTPATIENT
Start: 2019-05-05 | End: 2019-05-07 | Stop reason: HOSPADM

## 2019-05-05 RX ORDER — CEFAZOLIN SODIUM 1 G/50ML
2000 SOLUTION INTRAVENOUS EVERY 12 HOURS
Status: DISCONTINUED | OUTPATIENT
Start: 2019-05-05 | End: 2019-05-05

## 2019-05-05 RX ORDER — NITROGLYCERIN 0.4 MG/1
0.4 TABLET SUBLINGUAL EVERY 5 MIN PRN
Status: DISCONTINUED | OUTPATIENT
Start: 2019-05-05 | End: 2019-05-07 | Stop reason: HOSPADM

## 2019-05-05 RX ORDER — NICOTINE POLACRILEX 4 MG
15-30 LOZENGE BUCCAL
Status: DISCONTINUED | OUTPATIENT
Start: 2019-05-05 | End: 2019-05-07 | Stop reason: HOSPADM

## 2019-05-05 RX ORDER — AMPICILLIN AND SULBACTAM 2; 1 G/1; G/1
3 INJECTION, POWDER, FOR SOLUTION INTRAMUSCULAR; INTRAVENOUS EVERY 6 HOURS
Status: DISCONTINUED | OUTPATIENT
Start: 2019-05-05 | End: 2019-05-07 | Stop reason: HOSPADM

## 2019-05-05 RX ORDER — ACETAMINOPHEN 325 MG/1
650 TABLET ORAL EVERY 4 HOURS PRN
Status: DISCONTINUED | OUTPATIENT
Start: 2019-05-05 | End: 2019-05-06

## 2019-05-05 RX ORDER — MAGNESIUM SULFATE HEPTAHYDRATE 40 MG/ML
4 INJECTION, SOLUTION INTRAVENOUS EVERY 4 HOURS PRN
Status: DISCONTINUED | OUTPATIENT
Start: 2019-05-05 | End: 2019-05-07 | Stop reason: HOSPADM

## 2019-05-05 RX ADMIN — SODIUM CHLORIDE, PRESERVATIVE FREE: 5 INJECTION INTRAVENOUS at 13:18

## 2019-05-05 RX ADMIN — FUROSEMIDE 80 MG: 40 TABLET ORAL at 10:40

## 2019-05-05 RX ADMIN — AMPICILLIN SODIUM AND SULBACTAM SODIUM 3 G: 2; 1 INJECTION, POWDER, FOR SOLUTION INTRAMUSCULAR; INTRAVENOUS at 03:11

## 2019-05-05 RX ADMIN — CARVEDILOL 25 MG: 25 TABLET, FILM COATED ORAL at 19:20

## 2019-05-05 RX ADMIN — POTASSIUM CHLORIDE 10 MEQ: 750 TABLET, FILM COATED, EXTENDED RELEASE ORAL at 19:20

## 2019-05-05 RX ADMIN — VANCOMYCIN HYDROCHLORIDE 2000 MG: 5 INJECTION, POWDER, LYOPHILIZED, FOR SOLUTION INTRAVENOUS at 04:17

## 2019-05-05 RX ADMIN — INSULIN ASPART 3 UNITS: 100 INJECTION, SOLUTION INTRAVENOUS; SUBCUTANEOUS at 13:16

## 2019-05-05 RX ADMIN — AMPICILLIN SODIUM AND SULBACTAM SODIUM 3 G: 2; 1 INJECTION, POWDER, FOR SOLUTION INTRAMUSCULAR; INTRAVENOUS at 21:18

## 2019-05-05 RX ADMIN — INSULIN GLARGINE 36 UNITS: 100 INJECTION, SOLUTION SUBCUTANEOUS at 10:42

## 2019-05-05 RX ADMIN — LEVOTHYROXINE SODIUM 137 MCG: 25 TABLET ORAL at 21:18

## 2019-05-05 RX ADMIN — ISOSORBIDE MONONITRATE 30 MG: 30 TABLET, EXTENDED RELEASE ORAL at 10:40

## 2019-05-05 RX ADMIN — AMPICILLIN SODIUM AND SULBACTAM SODIUM 3 G: 2; 1 INJECTION, POWDER, FOR SOLUTION INTRAMUSCULAR; INTRAVENOUS at 14:32

## 2019-05-05 RX ADMIN — ATORVASTATIN CALCIUM 40 MG: 40 TABLET, FILM COATED ORAL at 19:20

## 2019-05-05 RX ADMIN — SODIUM CHLORIDE, PRESERVATIVE FREE: 5 INJECTION INTRAVENOUS at 16:09

## 2019-05-05 RX ADMIN — AMPICILLIN SODIUM AND SULBACTAM SODIUM 3 G: 2; 1 INJECTION, POWDER, FOR SOLUTION INTRAMUSCULAR; INTRAVENOUS at 08:58

## 2019-05-05 RX ADMIN — CARVEDILOL 25 MG: 25 TABLET, FILM COATED ORAL at 10:40

## 2019-05-05 RX ADMIN — LISINOPRIL 5 MG: 5 TABLET ORAL at 10:40

## 2019-05-05 RX ADMIN — SODIUM CHLORIDE 65 ML: 9 INJECTION, SOLUTION INTRAVENOUS at 02:59

## 2019-05-05 RX ADMIN — IOPAMIDOL 100 ML: 755 INJECTION, SOLUTION INTRAVENOUS at 02:56

## 2019-05-05 RX ADMIN — PANTOPRAZOLE SODIUM 40 MG: 40 TABLET, DELAYED RELEASE ORAL at 10:40

## 2019-05-05 RX ADMIN — POTASSIUM CHLORIDE 10 MEQ: 750 TABLET, FILM COATED, EXTENDED RELEASE ORAL at 10:40

## 2019-05-05 ASSESSMENT — ACTIVITIES OF DAILY LIVING (ADL)
ADLS_ACUITY_SCORE: 12
ADLS_ACUITY_SCORE: 13
ADLS_ACUITY_SCORE: 12
ADLS_ACUITY_SCORE: 12

## 2019-05-05 ASSESSMENT — ENCOUNTER SYMPTOMS
CHILLS: 0
COUGH: 0
FEVER: 0
NAUSEA: 0
VOMITING: 0
TREMORS: 1

## 2019-05-05 ASSESSMENT — MIFFLIN-ST. JEOR
SCORE: 2006.85
SCORE: 1995.5

## 2019-05-05 NOTE — ED PROVIDER NOTES
History     Chief Complaint:  Rash    HPI   Jayro Elder is a 68 year old male with a history of diabetes, toe amputations, cellulitis who presents with toe redness. The patient states that about a week ago he noticed redness and pain in his right third toe which had progressively getting worse. He also notes shakes and a headache. His wife notes that the plantar aspect of his toe is having purulent drainage. The patient denies any fevers, chills, nausea, vomiting, or cough.     Allergies:  No known drug allergies     Medications:    apixaban ANTICOAGULANT (ELIQUIS) 5 MG tablet  atorvastatin (LIPITOR) 40 MG tablet  carvedilol (COREG) 25 MG tablet  furosemide (LASIX) 80 MG tablet  Insulin Aspart (NOVOLOG SC)  Insulin Glargine (LANTUS SC)  isosorbide mononitrate (IMDUR) 30 MG 24 hr tablet  levothyroxine (SYNTHROID, LEVOTHROID) 137 MCG tablet  lisinopril (PRINIVIL/ZESTRIL) 5 MG tablet  nitroglycerin (NITROSTAT) 0.4 MG sublingual tablet  pantoprazole (PROTONIX) 40 MG enteric coated tablet  potassium chloride ER (K-DUR/KLOR-CON M) 10 MEQ CR tablet  Cholecalciferol (VITAMIN D3) 2000 units TABS     Past Medical History:    CAD (coronary artery disease)             Cancer (H)        Cardiomyopathy (H)      Cellulitis of left lower extremity             Cerebral infarction (H)              Diabetic ulcer of left midfoot associated with type 2 diabetes mellitus, with fat layer exposed (H)     Essential hypertension, benign            Generalized osteoarthrosis, unspecified site   Microalbuminuria          Myocardial infarction (H)           Neuropathy       Sepsis (H)         Sleep apnea      Substance abuse (H)    Syncope, unspecified syncope type     Thyroid disease            Tobacco use disorder   Type 2 diabetes mellitus with diabetic peripheral angiopathy without gangrene, unspecified long term insulin use status  CVA (cerebrovascular accident)           Acute kidney insufficiency        Acute systolic heart  "failure (H)             CHF (congestive heart failure) (H)       Cellulitis of right lower leg         Bacteremia        Pressure ulcer of toe of right foot, stage 3 (H)             Cellulitis of left lower extremity                         Homonymous hemianopsia, right         Chronic atrial fibrillation (H)      coronary angiogram      Health Care Home        CARDIOVASCULAR SCREENING; LDL GOAL LESS THAN 100     Cardiovascular disease            Benign neoplasm of lower jaw bone     Generalized osteoarthrosis, unspecified site   Tobacco use disorder   Hypertension goal BP (blood pressure) < 140/90          Past Surgical History:    Amputate toes x3  Angiogram x4  Back surgery  Irrigation and debridement foot  Bunion surgery   Left shoulder surgery    Family History:    Cancer  Thyroid disease  Diabetes  Arthritis    Social History:  Patient presents with wife  Former smoker  Positive for alcohol use.   Marital Status:        Review of Systems   Constitutional: Negative for chills and fever.   Respiratory: Negative for cough.    Gastrointestinal: Negative for nausea and vomiting.   Skin: Positive for rash.   Neurological: Positive for tremors.   All other systems reviewed and are negative.        Physical Exam   First Vitals:  BP: 138/83  Pulse: 104  Heart Rate: 82  Temp: 98.6  F (37  C)  Resp: 18  Height: 193 cm (6' 4\")  Weight: 112.4 kg (247 lb 12.8 oz)  SpO2: 90 %      Physical Exam  General: The patient is alert, in no respiratory distress.    HENT: Mucous membranes moist.    Cardiovascular: Regular rate and rhythm. Good pulses in all four extremities. Normal capillary refill and skin turgor.     Respiratory: Lungs are clear. No nasal flaring. No retractions. No wheezing, no crackles.    Gastrointestinal: Abdomen soft. No guarding, no rebound. No palpable hernias.     Musculoskeletal: No gross deformity. no focal bony deformity.  amputation of right 4th toe, 2nd toe is shortened.     Skin: no erythema of " right foot. Superficial ulcer of the plantar surface of 3rd toe with no drainage. Mild erythema on medial surface of right calf.     Neurologic: The patient is alert and oriented x3. GCS 15. No testable cranial nerve deficit. Follows commands with clear and appropriate speech. Gives appropriate answers. Good strength in all extremities. No gross neurologic deficit. Sensation decreased. Pupils are round and reactive. No meningismus.     Lymphatic: No cervical adenopathy. No lower extremity swelling.    Psychiatric: The patient is non-tearful.    Emergency Department Course   Imaging:  Radiographic findings were communicated with the patient who voiced understanding of the findings.  CT right foot w contrast:  No soft tissue gas.  Previous 2nd, 3rd, and 4th toe amputations. Erosion alond dorsal middle phalaynx suspicious for osteomyelitis.  Full report by MSLOULOU rad to follow.  Per radiology    Laboratory:  Creatinine POCT: 1.4 , GFR: 50 (L)  Lactic acid: 1.8  CBC: WBC: 9.2, HGB: 11.7 (L), PLT: 223  Blood culture x2: pending  BMP: Glucose 212 (H), Creatinine: 1.28 (H), GFR: 57 (L), o/w WNL  INR: 1.34 (H)    Interventions:  0031 - Unasyn 3 g IV  0417 - Vancocin 2000 mg IV    Emergency Department Course:  Nursing notes and vitals reviewed.  0148: I performed an exam of the patient as documented above.     0348: Findings and plan explained to the Patient who consents to admission.     0405: Discussed the patient with Dr. Lyles, who will admit the patient to a medical bed for further monitoring, evaluation, and treatment.     Impression & Plan    Medical Decision Making:  The patient had an amputation of his toe on March 12th and states he was doing ok since that time, except has noted fevers and drainage. I felt the inside of his shoe and there is a pad that would touch the tip of his toe which maybe would cause the ulcer. I was concerned about osteomyelitis though. He has decreased sensation from diabetes. His labs  looked reassuring; however, the CT scans showed signs suspicious for osteomyelitis. I had ordered Unasyn for cellulitis, then I broadened this coverage with vancomycin. He was admitted to the hospital in good condition for further evaluation and probable surgical intervention.       Diagnosis:    ICD-10-CM    1. Other acute osteomyelitis of right foot (H) M86.171    2. Chills R68.83    3. Type 2 diabetes mellitus with hyperglycemia, unspecified whether long term insulin use (H) E11.65      I, Don Miranda, am serving as a scribe on 5/5/2019 at 1:48 AM to personally document services performed by Manoj Shannon MD based on my observations and the provider's statements to me.       Don Miranda  5/5/2019   [facility] EMERGENCY DEPARTMENT       Manoj Shannon MD  05/05/19 0557

## 2019-05-05 NOTE — PROGRESS NOTES
Pt arrived on the floor at around 530am. Alert and orientedx4, introduced to room and call light system.Up with SBA with walker.+ve bowel sounds.Saline lock.Voiding adequate vee in the bathroom. Blood sugar check @ 6am 131. No coverage given.On abx Vanco.q12hrs. Will continue to monitor.

## 2019-05-05 NOTE — ED TRIAGE NOTES
States the redness and pain is back in his rt toes, previous amputation on that foot and hx of cellulitis otherwise alert and oriented, ABC intact

## 2019-05-05 NOTE — CONSULTS
PATIENT HISTORY:  Jayro Elder is a 68 year old male who was admitted for redness and ulcer to right foot. .      I was asked to see Jayro Elder  by  for ulcer right 3rd toe.     Patient was seen at bedside.  Is well know to our service. He recently had partial right 3rd and 4th toe amputations. Notes that yesterday, he noticed redness to the right foot and came in. Foot was also more sore when he would walk on it.  Has been draining a lot.       Review of Systems:  Patient denies fever, chills, rash, woun, stiffness, limping,  weakness, heart burn, blood in stool, chest pain with activity, calf pain when walking, shortness of breath with activity, chronic cough, easy bleeding/bruising, swelling of ankles, excessive thirst, fatigue, depression, anxiety.  Patient admits to numbness, wound.     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     Cardiomyopathy (H)      Cellulitis of left lower extremity 3/13/2018     Cerebral infarction (H)     eye sight decreased in peripheral of right side and blurry     Diabetic ulcer of left midfoot associated with type 2 diabetes mellitus, with fat layer exposed (H) 3/13/2018     Essential hypertension, benign 11/13/2002     Generalized osteoarthrosis, unspecified site 11/13/2002     Microalbuminuria 3/13/2018    X1     Myocardial infarction (H)      Neuropathy      Sepsis (H)      Sleep apnea     doesn't use it     Substance abuse (H)      Syncope, unspecified syncope type 3/13/2018     Thyroid disease     takes medicine     Tobacco use disorder 11/13/2002     Type 2 diabetes mellitus with diabetic peripheral angiopathy without gangrene, unspecified long term insulin use status 2005        PAST SURGICAL HISTORY:   Past Surgical History:   Procedure Laterality Date     AMPUTATE TOE(S) Right 1/6/2019     Procedure: Right open second toe partial amputation;  Surgeon: Sabino Garcia DPM;  Location: [facility] OR     AMPUTATE TOE(S) Right 1/8/2019    Procedure: 1.  Revision right second toe amputation with resection of distal half of proximal phalanx with full closure.    2.  Debridement of callus/preulcerative lesion, distal left second toe and plantar left first metatarsophalangeal joint.;  Surgeon: Ryan Bhagat DPM;  Location: [facility] OR     AMPUTATE TOE(S) Right 3/12/2019    Procedure: Partial right fourth toe amputation.;  Surgeon: Ryan Bhagat DPM;  Location: [facility] OR     ANGIOGRAM  6/29/05    subtotal occ.mid LAD,SUSAN mid LAD     ANGIOGRAM  7/05    mild CAD,patent stent,no flow-limiting lesions,sev.LV dysf.LAD enlarged     ANGIOGRAM  2/09    Sev.single vessel disease,Mod LV dysf.distal LAD 90%,70-75% mid lad just before prev stent,SUSAN to prox.mid LAD, endeavor to distal LAD     ANGIOGRAM  11/13/13    restenosis, stent LAD     BACK SURGERY      back surgery x3     C NONSPECIFIC PROCEDURE      Laminectomy x 3 - (1983 x 2 & 1990)     C NONSPECIFIC PROCEDURE Bilateral 1998    Bunionectomy     C NONSPECIFIC PROCEDURE  1959    Gingival surgery at age 9     HEART CATH LEFT HEART CATH  06/13/2017    SUSAN to OM3     INCISION AND DRAINAGE TOE, COMBINED Bilateral 9/11/2018    Procedure: COMBINED INCISION AND DRAINAGE TOE;  1) Irrigation and debridement ulcer left great toe  2) Amputation 3rd toe right foot at distal interphalangeal joint level;  Surgeon: Miki Harp MD;  Location:  OR     IRRIGATION AND DEBRIDEMENT FOOT, COMBINED Left 3/12/2019    Procedure: Debridement of left foot ulcer sub first MPJ 2 x 2.5 cm down to and including subcutaneous tissue, callus debridement for preulcerative lesion, left second toe.;  Surgeon: Ryan Bhagat DPM;  Location:  OR     ORTHOPEDIC SURGERY      bunion surgery both feet     ORTHOPEDIC SURGERY      left shoulder     SOFT TISSUE SURGERY      debridement of  toe numerous time     VASCULAR SURGERY      7 stents in heart        MEDICATIONS:   Current Facility-Administered Medications:      acetaminophen (TYLENOL) tablet 650 mg, 650 mg, Oral, Q4H PRN, Oscar Lyles MD     alum & mag hydroxide-simethicone (MYLANTA ES/MAALOX  ES) suspension 30 mL, 30 mL, Oral, Q4H PRN, Oscar Lyles MD     ampicillin-sulbactam (UNASYN) 3 g vial to attach to  mL bag, 3 g, Intravenous, Q6H, Oscar Lyles MD, 3 g at 05/05/19 0858     atorvastatin (LIPITOR) tablet 40 mg, 40 mg, Oral, QPM, Oscar Lyles MD     carvedilol (COREG) tablet 25 mg, 25 mg, Oral, BID w/meals, Oscar Lyles MD     glucose gel 15-30 g, 15-30 g, Oral, Q15 Min PRN **OR** dextrose 50 % injection 25-50 mL, 25-50 mL, Intravenous, Q15 Min PRN **OR** glucagon injection 1 mg, 1 mg, Subcutaneous, Q15 Min PRN, Oscar Lyles MD     furosemide (LASIX) tablet 80 mg, 80 mg, Oral, Daily, Oscar Lyles MD     HYDROmorphone (PF) (DILAUDID) injection 0.3-0.5 mg, 0.3-0.5 mg, Intravenous, Q2H PRN, Oscar Lyles MD     insulin aspart (NovoLOG) inj (RAPID ACTING), 1-10 Units, Subcutaneous, TID AC, Oscar Lyles MD     insulin aspart (NovoLOG) inj (RAPID ACTING), 1-7 Units, Subcutaneous, At Bedtime, Oscar Lyles MD     insulin glargine (LANTUS PEN) injection 36 Units, 36 Units, Subcutaneous, QAM, Oscar Lyles MD     isosorbide mononitrate (IMDUR) 24 hr tablet 30 mg, 30 mg, Oral, Daily, Oscar Lyles MD     levothyroxine (SYNTHROID/LEVOTHROID) tablet 137 mcg, 137 mcg, Oral, At Bedtime, Oscar Lyles MD     lidocaine (LMX4) cream, , Topical, Q1H PRN, Manoj Shannon MD     lidocaine 1 % 0.1-1 mL, 0.1-1 mL, Other, Q1H PRN, Manoj Shannon MD     lisinopril (PRINIVIL/ZESTRIL) tablet 5 mg, 5 mg, Oral, Daily, Oscar Lyles MD     magnesium sulfate 4 g in 100 mL sterile water (premade), 4 g, Intravenous, Q4H PRN, Oscar Lyles MD     melatonin tablet 1 mg,  1 mg, Oral, At Bedtime PRN, Oscar Lyles MD     metoclopramide (REGLAN) tablet 5 mg, 5 mg, Oral, Q6H PRN **OR** metoclopramide (REGLAN) injection 5 mg, 5 mg, Intravenous, Q6H PRN, Oscar Lyles MD     naloxone (NARCAN) injection 0.1-0.4 mg, 0.1-0.4 mg, Intravenous, Q2 Min PRN, Oscar Lyles MD     nitroGLYcerin (NITROSTAT) sublingual tablet 0.4 mg, 0.4 mg, Sublingual, Q5 Min PRN, Oscar Lyles MD     ondansetron (ZOFRAN-ODT) ODT tab 4 mg, 4 mg, Oral, Q6H PRN **OR** ondansetron (ZOFRAN) injection 4 mg, 4 mg, Intravenous, Q6H PRN, Oscar Lyles MD     oxyCODONE (ROXICODONE) tablet 5-10 mg, 5-10 mg, Oral, Q3H PRN, Oscar Lyles MD     pantoprazole (PROTONIX) EC tablet 40 mg, 40 mg, Oral, QAM AC, Oscar Lyles MD     polyethylene glycol (MIRALAX/GLYCOLAX) Packet 17 g, 17 g, Oral, Daily PRN, Oscar Lyles MD     potassium chloride (KLOR-CON) Packet 20-40 mEq, 20-40 mEq, Oral or Feeding Tube, Q2H PRN, Oscar Lyles MD     potassium chloride 10 mEq in 100 mL intermittent infusion with 10 mg lidocaine, 10 mEq, Intravenous, Q1H PRN, Oscar Lyles MD     potassium chloride 10 mEq in 100 mL sterile water intermittent infusion (premix), 10 mEq, Intravenous, Q1H PRN, Oscar Lyles MD     potassium chloride 20 mEq in 50 mL intermittent infusion, 20 mEq, Intravenous, Q1H PRN, Oscar Lyles MD     potassium chloride ER (K-DUR/KLOR-CON M) CR tablet 10 mEq, 10 mEq, Oral, BID, Oscar Lyles MD     potassium chloride ER (K-DUR/KLOR-CON M) CR tablet 20-40 mEq, 20-40 mEq, Oral, Q2H PRN, Oscar Lyles MD     prochlorperazine (COMPAZINE) injection 5 mg, 5 mg, Intravenous, Q6H PRN **OR** prochlorperazine (COMPAZINE) tablet 5 mg, 5 mg, Oral, Q6H PRN **OR** prochlorperazine (COMPAZINE) Suppository 12.5 mg, 12.5 mg, Rectal, Q12H PRN, Oscar Lyles MD     vancomycin (VANCOCIN) 2,000 mg in sodium chloride 0.9 % 500 mL intermittent infusion, 2,000 mg, Intravenous, Q12H,  Oscar Lyles MD     ALLERGIES:    Allergies   Allergen Reactions     No Known Allergies         SOCIAL HISTORY:   Social History     Socioeconomic History     Marital status:      Spouse name: Not on file     Number of children: Not on file     Years of education: Not on file     Highest education level: Not on file   Occupational History     Not on file   Social Needs     Financial resource strain: Not on file     Food insecurity:     Worry: Not on file     Inability: Not on file     Transportation needs:     Medical: Not on file     Non-medical: Not on file   Tobacco Use     Smoking status: Former Smoker     Packs/day: 1.00     Years: 25.00     Pack years: 25.00     Types: Cigarettes     Last attempt to quit: 2005     Years since quittin.7     Smokeless tobacco: Never Used   Substance and Sexual Activity     Alcohol use: No     Alcohol/week: 2.4 oz     Types: 4 Shots of liquor per week     Frequency: Never     Comment: almost every day     Drug use: No     Sexual activity: Yes     Partners: Female   Lifestyle     Physical activity:     Days per week: Not on file     Minutes per session: Not on file     Stress: Not on file   Relationships     Social connections:     Talks on phone: Not on file     Gets together: Not on file     Attends Bahai service: Not on file     Active member of club or organization: Not on file     Attends meetings of clubs or organizations: Not on file     Relationship status: Not on file     Intimate partner violence:     Fear of current or ex partner: Not on file     Emotionally abused: Not on file     Physically abused: Not on file     Forced sexual activity: Not on file   Other Topics Concern     Parent/sibling w/ CABG, MI or angioplasty before 65F 55M? Yes      Service Not Asked     Blood Transfusions Not Asked     Caffeine Concern No     Comment: 3 cups daily     Occupational Exposure Not Asked     Hobby Hazards Not Asked     Sleep Concern Not Asked  "    Stress Concern Not Asked     Weight Concern Not Asked     Special Diet Yes     Comment: watching carb intake     Back Care Not Asked     Exercise No     Comment: some walking     Bike Helmet Not Asked     Seat Belt Not Asked     Self-Exams Not Asked   Social History Narrative     Not on file        FAMILY HISTORY:   Family History   Problem Relation Age of Onset     Cancer - colorectal Sister      Thyroid Disease Brother      Cardiovascular Brother      Diabetes Brother      Cancer Father         tumor in chest and throat     Arthritis Mother      Thyroid Disease Mother      Diabetes Mother      Glaucoma No family hx of      Macular Degeneration No family hx of         EXAM:Vitals: /83   Pulse 112   Temp 97.3  F (36.3  C)   Resp 18   Ht 1.93 m (6' 4\")   Wt 113.5 kg (250 lb 4.8 oz)   SpO2 95%   BMI 30.47 kg/m    BMI= Body mass index is 30.47 kg/m .    LABS:    WBC   Date Value Ref Range Status   05/05/2019 9.2 4.0 - 11.0 10e9/L Final     HA1C: 9.0 (5/2019)    General appearance: Patient is alert and fully cooperative with history & exam.  No sign of distress is noted during the visit.      Psychiatric: Affect is pleasant & appropriate.  Patient appears motivated to improve health.       Respiratory: Breathing is regular & unlabored while sitting.      HEENT: Hearing is intact to spoken word.  Speech is clear.  No gross evidence of visual impairment that would impact ambulation.       Dermatologic: full thickness ulceration to plantar right partial 3rd toe. Purulent drainage noted. Measures 1.0cm x 1.0cm x 0.2cm after debridement. Redness to toe and dorsal foot as well as right leg.        Vascular: DP & PT pulses are intact & regular bilaterally.   edema and varicosities noted.  CFT and skin temperature is normal to both lower extremities.       Neurologic: Lower extremity sensation is absent to feet.      Musculoskeletal: Patient is ambulatory without assistive device or brace.  Multiple previous " partial toe amputations to right foot (2-5)    IMAGING: CT right foot - Amputation of the second and fourth toes. There is soft  tissue edema around the third toe consistent with cellulitis. There is  no evidence of an abscess or osteomyelitis.      ASSESSMENT: 68 yr old uncontrolled diabetic male with cellulitis right foot, ulceration with fat layer exposed and previous right lesser toe amputations.      PLAN:    - REviewed CT results with patient. Currently no bone infection but the partial right 3rd toe has ulcer and is prone to further breakdown or risk of bone infection as it sticks out farther than the other toes and is a pressure point.   -given the above, recommend further partial right 3rd toe amputation to try to create a more even parabola of the foot and not a pressure area that can lead to ulcers.  Discussed risks and benefits with patient.   -He wishes to proceed with surgery.   -scheduled for 5:30pm tomorrow.  -NPO after 9am tomorrow morning. Orders placed.     Procedure: After verbal consent, excisional debridement was performed on ulcer.  #15 blade was used to debride ulcer down to and including subcutaneous tissue. Bleeding controlled with light pressure.   No drainage noted.  No anesthesia was used due to neuropathy. Dry dressing applied to foot.  Patient tolerated procedure well.    Татьяна Mckeon DPM, Podiatry/Foot and Ankle Surgery    9:41 AM

## 2019-05-05 NOTE — ED NOTES
"[facility]  ED Nurse Handoff Report    Jayro Elder is a 68 year old male   ED Chief complaint: Cellulitis  . ED Diagnosis:   Final diagnoses:   Other acute osteomyelitis of right foot (H)   Chills     Allergies:   Allergies   Allergen Reactions     No Known Allergies        Code Status: not on file  Activity level - Baseline/Home:  Independent. Activity Level - Current:   Stand with Assist. Lift room needed: No. Bariatric: No   Needed: No   Isolation: No. Infection: Not Applicable.     Vital Signs:   Vitals:    05/05/19 0130 05/05/19 0135 05/05/19 0330   BP:  138/83 149/84   Pulse:   104   Resp:  18    Temp: 98.6  F (37  C) 98.6  F (37  C)    TempSrc: Oral     SpO2:  90% 97%   Weight: 112.4 kg (247 lb 12.8 oz)     Height: 1.93 m (6' 4\")         Cardiac Rhythm:  ,      Pain level: 0-10 Pain Scale: 5  Patient confused: No. Patient Falls Risk: Yes.   Elimination Status: Has not yet voided   Patient Report - Initial Complaint: Cellulitis. Focused Assessment:  Jayro Elder is a 68 year old male with a history of diabetes, toe amputations, cellulitis who presents with toe redness. The patient states that about a week ago he noticed redness and pain in his right third toe which had progressively getting worse. He also notes shakes and a headache. His wife notes that the plantar aspect of his toe is having purulent drainage. The patient denies any fevers, chills, nausea, vomiting, or cough.     Hx of amputations of toes.  Has ulcer to bottom of 2nd toe on right foot.  Right foot/lower leg erythema, warm.  Tests Performed:   Labs Ordered and Resulted from Time of ED Arrival Up to the Time of Departure from the ED   CBC WITH PLATELETS DIFFERENTIAL - Abnormal; Notable for the following components:       Result Value    RBC Count 4.04 (*)     Hemoglobin 11.7 (*)     Hematocrit 35.9 (*)     All other components within normal limits   BASIC METABOLIC PANEL - Abnormal; Notable for the following " components:    Glucose 212 (*)     Creatinine 1.28 (*)     GFR Estimate 57 (*)     All other components within normal limits   INR - Abnormal; Notable for the following components:    INR 1.34 (*)     All other components within normal limits   CREATININE POCT - Abnormal; Notable for the following components:    Creatinine 1.4 (*)     GFR Estimate 50 (*)     All other components within normal limits   LACTIC ACID WHOLE BLOOD   PERIPHERAL IV CATHETER   ISTAT CREATININE NURSING POCT   BLOOD CULTURE   BLOOD CULTURE     CT Foot Right w Contrast    (Results Pending)       Treatments provided: See MAR  Family Comments: wife at bedside  OBS brochure/video discussed/provided to patient:  N/A  ED Medications:   Medications   lidocaine 1 % 0.1-1 mL (has no administration in time range)   lidocaine (LMX4) cream (has no administration in time range)   sodium chloride (PF) 0.9% PF flush 3 mL (has no administration in time range)   sodium chloride (PF) 0.9% PF flush 3 mL (has no administration in time range)   ampicillin-sulbactam (UNASYN) 3 g vial to attach to  mL bag (3 g Intravenous New Bag 5/5/19 0311)   vancomycin (VANCOCIN) 1000 mg in dextrose 5% 200 mL PREMIX (has no administration in time range)   0.9% sodium chloride BOLUS (0 mLs Intravenous Stopped 5/5/19 0300)   iopamidol (ISOVUE-370) solution 500 mL (100 mLs Intravenous Given 5/5/19 0256)     Drips infusing:  No  For the majority of the shift, the patient's behavior Green. Interventions performed were rounding.     Severe Sepsis OR Septic Shock Diagnosis Present: No      ED Nurse Name/Phone Number: Anjelica Friend,   3:53 AM  RECEIVING UNIT ED HANDOFF REVIEW    Above ED Nurse Handoff Report was reviewed: YES  Reviewed by: Shayne Murray on May 5, 2019 at 4:52 AM

## 2019-05-05 NOTE — PHARMACY-ADMISSION MEDICATION HISTORY
"Admission medication history interview status for this patient is complete. See [facility] admission navigator for allergy information, prior to admission medications and immunization status.     Medication history interview source(s):Patient  Medication history resources (including written lists, pill bottles, clinic record): [facility] Care Everywhere and [employer] dispense record  Primary pharmacy: [facility]/pharmacy #[id number] - [place], [place] - [address]    Changes made to PTA medication list:  Added: None  Deleted: Aspirin 81mg [pt denies starting];   Changed: insulin aspart (Novolog) -  prandial dose 8 units and sliding scale; insulin glargine (Lantus) 26 units QAM --> 36 units QAM [pt told by endocrinologist to increase]    Actions taken by pharmacist (provider contacted, etc): Sticky note to provider    Additional medication history information: Patient starting wearing a patch \"for diabetes\" which he says he orders through [employer] from [place] - he was unable to identify the name of the product or any ingredients.     Medication reconciliation/reorder completed by provider prior to medication history? Yes    Do you take OTC medications (eg tylenol, ibuprofen, fish oil, eye/ear drops, etc)? Yes - listed.    For patients on insulin therapy: YES  Lantus/levemir/NPH/Mix 70/30 dose:  Lantus 36 units QAM  Sliding scale Novolog - YES   If Yes, do you have a baseline novolog pre-meal dose: 8 units with meals  Patients eat three meals a day:  NO - two meals and one snack   How many episodes of hypoglycemia do you have per week: ZERO   How many missed doses do you have per week: \"ZERO\"  How many times do you check your blood glucose per day: \"SEVERAL\"  Do you have a Continuous glucose monitor (CGM)   NO   Any Barriers to therapy - Be specific :  cost of medications, comfortable with giving injections (if applicable), comfortable and confident with current diabetes regimen: NO      Prior to Admission medications  "   Medication Sig Last Dose Taking? Auth Provider   apixaban ANTICOAGULANT (ELIQUIS) 5 MG tablet Take 1 tablet (5 mg) by mouth 2 times daily 5/4/2019 at PM Yes Gill Doty PA   atorvastatin (LIPITOR) 40 MG tablet Take 1 tablet (40 mg) by mouth every evening 5/4/2019 at PM Yes Gill Doty PA   carvedilol (COREG) 25 MG tablet Take 1 tablet (25 mg) by mouth 2 times daily (with meals) 5/4/2019 at x2 Yes Gill Doty PA   Cholecalciferol (VITAMIN D3) 2000 units TABS Take 1 tablet by mouth daily 5/4/2019 at AM Yes Unknown, Entered By History   furosemide (LASIX) 80 MG tablet Take 1 tablet (80 mg) by mouth daily 5/4/2019 at AM Yes Gill Doty PA   insulin aspart (NOVOLOG FLEXPEN) 100 UNIT/ML pen Inject Subcutaneous 3 times daily (with meals) Sliding Scale  Do not give sliding scale insulin if Pre-Meal BG less than 140.   For Pre-Meal:   - 200 give 2 units.    - 250 give 4 units.    - 300 give 6 units.    - 350 give 8 units.    - 400 give 10 units. 5/4/2019 at PM Yes Unknown, Entered By History   insulin aspart (NOVOLOG PEN) 100 UNIT/ML pen Inject 8 Units Subcutaneous 3 times daily (with meals) 5/4/2019 at PM Yes Unknown, Entered By History   insulin glargine (LANTUS SOLOSTAR PEN) 100 UNIT/ML pen Inject 36 Units Subcutaneous every morning 5/4/2019 at AM Yes Unknown, Entered By History   isosorbide mononitrate (IMDUR) 30 MG 24 hr tablet Take 1 tablet (30 mg) by mouth daily 5/4/2019 at AM Yes Johnathan Mckeon MD   levothyroxine (SYNTHROID, LEVOTHROID) 137 MCG tablet Take 137 mcg by mouth At Bedtime  5/4/2019 at PM Yes Henrry Figueroa PA-C   lisinopril (PRINIVIL/ZESTRIL) 5 MG tablet Take 1 tablet (5 mg) by mouth daily 5/4/2019 at PM Yes Gill Doty PA   nitroglycerin (NITROSTAT) 0.4 MG sublingual tablet Place 1 tablet (0.4 mg) under the tongue every 5 minutes as needed for chest pain  Yes Davion Cordova PA-C   pantoprazole (PROTONIX) 40 MG enteric coated tablet Take 1  tablet (40 mg) by mouth every morning (before breakfast) 5/4/2019 at AM Yes Ana Frankel MD   potassium chloride ER (K-DUR/KLOR-CON M) 10 MEQ CR tablet Take 1 tablet (10 mEq) by mouth 2 times daily 5/4/2019 at PM Yes Justin Tomlin MD   insulin pen needle 31G X 6 MM Use  as directed.   Vinicio Cook MD   order for DME Equipment being ordered: Other: surgical shoe male XL  Treatment Diagnosis: sp right 2nd toe amputaion   Ryan Bhagat DPM   order for DME Equipment being ordered: Walker Wheels () and Walker ()  Treatment Diagnosis: Impaired gait, heel WB bilaterally.   Herb Zurita III, MD Keegan T. Ilenda, PharmD./PGY-1 Resident

## 2019-05-05 NOTE — PROGRESS NOTES
Patient seen and examined.  H&P reviewed.  68-year-old male patient with significant history of diabetes, previous amputations was admitted this morning by Oscar Lyles MD for foot ulcer with surrounding redness concerning for cellulitis.  Patient was started on IV vancomycin and Unasyn.  Podiatry consulted, CT reviewed and no evidence of bone infection noted per podiatry.  There is partial right third toe ulcer which is prone to further breakdown or risk of bone infection. Podiatry planning to do partial right third toe amputation tomorrow at 5:30 PM.  N.p.o. after 9 AM tomorrow.

## 2019-05-05 NOTE — H&P
Hospitalist Admission Note([facility])    Name: Jayro Elder    MRN: [id number]          YOB: 1950    Age: 68 year old  Date of admission: 5/5/2019  Primary care provider: No Ref-Primary, Physician              Assessment:       Brief summary of admission assessment:Jayro Elder is a 68 year old  male with a significant past medical history of diabetes with previous amputations who presents with foot ulcer with surrounding redness.  Patient follows with Dr. Montano and had partial fourth right toe amputation, as well as I&D of the left foot ulcer and callus debridement of the left second toe on 3/12/2019    ED evaluation revealed CT evidence of previous 2nd, 3rd, and 4th toe amputations. Erosion along dorsal middle phalaynx suspicious for osteomyelitis.    Patient was given intravenous injection of vancomycin and Unasyn and was admitted to our service for further care       Admission diagnoses:      #1.Acute osteomyelitis - Right foot     #2.Diabetes foot infection     #3.S/P  I&D of the left foot ulcer and callus debridement of the left second toe on 3/12/2019  #4.DM - insulin requiring     #5.Anticoagulation with Apixaban for PAF    Comorbid medical conditions:     #1.Ischemic cardiomyopathy    #2. Hypothyroidism    #3.Hypertension    #4. Calluses/chronic infection of the left foot ulcer           Plan/MDM:       > Admission Status: Will admit patient to hospitalist service as inpatient as patient likely need over two mid night stays in the hospital.     >Care plan:    --Admit to medical bed , continue abx with vancomycin and unasyn for now. Podiatry consult , follow cultures   -- BG and BP control   -- Hold anticoagulation and ASA   -- Pain control    >Supportive care:Pain management: acetominophen, oral narcotics and IV narcotics  Nausea and vomiting control measures    >Diet:Diet advanced    >Activity:Advance activity as tolerated    >Education/Counseling :Discussed  treatment plan with the patient    >Consults:Inpatient consult with podiatry    >VTE prophylactic measures:prophylaxis against venous thromboembolism    >Therapies:Wound care      >Additional orders:  --Care plan discussed with the patient/family and agreed to care plan   --Patient will be transferred to care of hospitalist attending for further evaluation and management as appropriate   --Old medical orders reviewed   --imaging result independently reviewed by me     (See orders placed for this visit by me )     - Home medication reviewed and will be continued as appropriate once pharmacy reconciliation is completed         Code Status/Disposition:     >Code Status:Full Code      >Disposition:anticipate discharge to home and Anticipate discharge in 3 days        Disclaimer: This note consists of symbols derived from keyboarding, dictation and/or voice recognition software. As a result, there may be errors in the script that have gone undetected. Please consider this when interpreting information found in this chart.             Chief Complaint:     Foot ulcer     History is obtained from the patient          History of Present Illness:      This patient is a 68 year old  male with a significant past medical history of diabetes with complications who presents with the following condition requiring a hospital admission:      Acute osteomyelitis      The patient is a 68-year-old male with history of diabetes and multiple amputations who presents with swelling, redness and ulceration of the right third toe.  Patient noticed redness and pain about a week ago which has been progressively worse.  Patient was also complaining of chills but denies fever.  Patient denies shortness of breath.  Patient denies any injuries.  States that he has been taking  medications for his diabetes.  Evaluation in the emergency department revealed CT evidence of osteomyelitis and was admitted to our service for further care.         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     Cardiomyopathy (H)      Cellulitis of left lower extremity 3/13/2018     Cerebral infarction (H)     eye sight decreased in peripheral of right side and blurry     Diabetic ulcer of left midfoot associated with type 2 diabetes mellitus, with fat layer exposed (H) 3/13/2018     Essential hypertension, benign 11/13/2002     Generalized osteoarthrosis, unspecified site 11/13/2002     Microalbuminuria 3/13/2018    X1     Myocardial infarction (H)      Neuropathy      Sepsis (H)      Sleep apnea     doesn't use it     Substance abuse (H)      Syncope, unspecified syncope type 3/13/2018     Thyroid disease     takes medicine     Tobacco use disorder 11/13/2002     Type 2 diabetes mellitus with diabetic peripheral angiopathy without gangrene, unspecified long term insulin use status 2005            Past Surgical History:     Past Surgical History:   Procedure Laterality Date     AMPUTATE TOE(S) Right 1/6/2019    Procedure: Right open second toe partial amputation;  Surgeon: Sabino Garcia DPM;  Location: [facility] OR     AMPUTATE TOE(S) Right 1/8/2019    Procedure: 1.  Revision right second toe amputation with resection of distal half of proximal phalanx with full closure.    2.  Debridement of callus/preulcerative lesion, distal left second toe and plantar left first metatarsophalangeal joint.;  Surgeon: Ryan Bhagat DPM;  Location: [facility] OR     AMPUTATE TOE(S) Right 3/12/2019    Procedure: Partial right fourth toe amputation.;  Surgeon: Ryan Bhagat DPM;  Location: [facility] OR     ANGIOGRAM  6/29/05    subtotal occ.mid LAD,SUSAN mid LAD     ANGIOGRAM  7/05    mild CAD,patent stent,no flow-limiting lesions,sev.LV dysf.LAD enlarged     ANGIOGRAM  2/09    Sev.single vessel disease,Mod LV dysf.distal LAD 90%,70-75%  mid lad just before prev stent,SUSAN to prox.mid LAD, endeavor to distal LAD     ANGIOGRAM  13    restenosis, stent LAD     BACK SURGERY      back surgery x3     C NONSPECIFIC PROCEDURE      Laminectomy x 3 - (1983 x 2 & )     C NONSPECIFIC PROCEDURE Bilateral 1998    Bunionectomy     C NONSPECIFIC PROCEDURE  89    Gingival surgery at age 9     HEART CATH LEFT HEART CATH  2017    SUSAN to OM3     INCISION AND DRAINAGE TOE, COMBINED Bilateral 2018    Procedure: COMBINED INCISION AND DRAINAGE TOE;  1) Irrigation and debridement ulcer left great toe  2) Amputation 3rd toe right foot at distal interphalangeal joint level;  Surgeon: Miki Harp MD;  Location: [facility] OR     IRRIGATION AND DEBRIDEMENT FOOT, COMBINED Left 3/12/2019    Procedure: Debridement of left foot ulcer sub first MPJ 2 x 2.5 cm down to and including subcutaneous tissue, callus debridement for preulcerative lesion, left second toe.;  Surgeon: Ryan Bhagat DPM;  Location: [facility] OR     ORTHOPEDIC SURGERY      bunion surgery both feet     ORTHOPEDIC SURGERY      left shoulder     SOFT TISSUE SURGERY      debridement of toe numerous time     VASCULAR SURGERY      7 stents in heart             Social History:     Social History     Tobacco Use     Smoking status: Former Smoker     Packs/day: 1.00     Years: 25.00     Pack years: 25.00     Types: Cigarettes     Last attempt to quit: 2005     Years since quittin.7     Smokeless tobacco: Never Used   Substance Use Topics     Alcohol use: No     Alcohol/week: 2.4 oz     Types: 4 Shots of liquor per week     Frequency: Never     Comment: almost every day             Family History:     Family History   Problem Relation Age of Onset     Cancer - colorectal Sister      Thyroid Disease Brother      Cardiovascular Brother      Diabetes Brother      Cancer Father         tumor in chest and throat     Arthritis Mother      Thyroid Disease Mother      Diabetes Mother       Glaucoma No family hx of      Macular Degeneration No family hx of      Reviewed and non contributory        Allergies:     Allergies   Allergen Reactions     No Known Allergies              Medications:        Prior to Admission medications    Medication Sig Last Dose Taking? Auth Provider   apixaban ANTICOAGULANT (ELIQUIS) 5 MG tablet Take 1 tablet (5 mg) by mouth 2 times daily   Gill Doty PA   aspirin (ASA) 81 MG EC tablet Take 1 tablet (81 mg) by mouth daily   Gill Doty PA   aspirin (ASA) 81 MG tablet Take 1 tablet (81 mg) by mouth daily  Patient not taking: Reported on 3/19/2019   Gill Doty PA   atorvastatin (LIPITOR) 40 MG tablet Take 1 tablet (40 mg) by mouth every evening   Gill Doty PA   carvedilol (COREG) 25 MG tablet Take 1 tablet (25 mg) by mouth 2 times daily (with meals)   Gill Doty PA   Cholecalciferol (VITAMIN D3) 2000 units TABS Take 1 tablet by mouth daily   Unknown, Entered By History   furosemide (LASIX) 80 MG tablet Take 1 tablet (80 mg) by mouth daily   Gill Doty PA   Insulin Aspart (NOVOLOG SC) Inject 6-8 Units Subcutaneous 3 times daily (with meals) Plus Sliding Scale.  Also, per pt: sometimes uses Insulin Aspart at night if BG is high.   Reported, Patient   insulin glargine (LANTUS SOLOSTAR PEN) 100 UNIT/ML pen Inject 26 Units Subcutaneous every morning  Patient taking differently: Inject 36 Units Subcutaneous every morning    Meenu Catherine MD   insulin pen needle 31G X 6 MM Use  as directed.   Vinicio Cook MD   isosorbide mononitrate (IMDUR) 30 MG 24 hr tablet Take 1 tablet (30 mg) by mouth daily   Johnathan Mckeon MD   levothyroxine (SYNTHROID, LEVOTHROID) 137 MCG tablet Take 137 mcg by mouth At Bedtime    Henrry Figueroa PA-C   lisinopril (PRINIVIL/ZESTRIL) 5 MG tablet Take 1 tablet (5 mg) by mouth daily   Gill Doty PA   nitroglycerin (NITROSTAT) 0.4 MG sublingual tablet Place 1 tablet (0.4 mg) under the tongue every 5 minutes as needed for  chest pain  Patient not taking: Reported on 4/12/2019   Davion Cordova PA-C   order for DME Equipment being ordered: Other: surgical shoe male XL  Treatment Diagnosis: sp right 2nd toe amputaion   Ryan Bhagat DPM   order for DME Equipment being ordered: Walker Wheels () and Walker ()  Treatment Diagnosis: Impaired gait, heel WB bilaterally.   Herb Zurita III, MD   pantoprazole (PROTONIX) 40 MG enteric coated tablet Take 1 tablet (40 mg) by mouth every morning (before breakfast)   Ana Frankel MD   potassium chloride ER (K-DUR/KLOR-CON M) 10 MEQ CR tablet Take 1 tablet (10 mEq) by mouth 2 times daily   Justin Tomlin MD          Review of Systems:     A Comprehensive greater than 10 system review of systems was carried out.  Pertinent positives and negatives are noted above in HPI.  Otherwise negative for contributory information.           Physical Exam:     Vital signs were reviewed    Temp:  [98.6  F (37  C)] 98.6  F (37  C)  Pulse:  [104] 104  Heart Rate:  [82] 82  Resp:  [18] 18  BP: (138-149)/(83-84) 149/84  SpO2:  [90 %-97 %] 97 %        GEN: awake, alert, cooperative, no apparent distress, oriented x 3    NECK:Supple ,no mass or thyromegaly     HEENT:  Normocephalic/atraumatic, no scleral icterus, no nasal discharge, mouth moist.    CV:  Regular rate and rhythm, no murmur or JVD.  S1 + S2 noted, no S3 or S4.    LUNGS:  Clear to auscultation bilaterally without rales/rhonchi/wheezing/retractions.  Symmetric chest rise on inhalation noted.    ABD:  Active bowel sounds, soft, non-tender/non-distended.  No rebound/guarding/rigidity.    EXT: Superficial laceration of plantar aspect of the third toe.  Mild erythema surrounding the ulcer.  Evidence of multiple toe amputations  LGS: No cervical or axillary lymphadenopathy     SKIN:  Dry to touch, warm ,no exanthems noted in the visualized areas.    Neurologic:Grossly intact,non focal . No acute focal neurologic  deficit     Psychaitric exam: Mood and affect normal     Additional significant  Findings:               Data:       All laboratory and imaging data in the past 24 hours reviewed     Results for orders placed or performed during the hospital encounter of 05/05/19   CBC with platelets differential   Result Value Ref Range    WBC 9.2 4.0 - 11.0 10e9/L    RBC Count 4.04 (L) 4.4 - 5.9 10e12/L    Hemoglobin 11.7 (L) 13.3 - 17.7 g/dL    Hematocrit 35.9 (L) 40.0 - 53.0 %    MCV 89 78 - 100 fl    MCH 29.0 26.5 - 33.0 pg    MCHC 32.6 31.5 - 36.5 g/dL    RDW 13.1 10.0 - 15.0 %    Platelet Count 223 150 - 450 10e9/L    Diff Method Automated Method     % Neutrophils 77.5 %    % Lymphocytes 11.5 %    % Monocytes 8.7 %    % Eosinophils 1.7 %    % Basophils 0.3 %    % Immature Granulocytes 0.3 %    Nucleated RBCs 0 0 /100    Absolute Neutrophil 7.1 1.6 - 8.3 10e9/L    Absolute Lymphocytes 1.1 0.8 - 5.3 10e9/L    Absolute Monocytes 0.8 0.0 - 1.3 10e9/L    Absolute Eosinophils 0.2 0.0 - 0.7 10e9/L    Absolute Basophils 0.0 0.0 - 0.2 10e9/L    Abs Immature Granulocytes 0.0 0 - 0.4 10e9/L    Absolute Nucleated RBC 0.0    Basic metabolic panel   Result Value Ref Range    Sodium 137 133 - 144 mmol/L    Potassium 3.9 3.4 - 5.3 mmol/L    Chloride 102 94 - 109 mmol/L    Carbon Dioxide 32 20 - 32 mmol/L    Anion Gap 3 3 - 14 mmol/L    Glucose 212 (H) 70 - 99 mg/dL    Urea Nitrogen 22 7 - 30 mg/dL    Creatinine 1.28 (H) 0.66 - 1.25 mg/dL    GFR Estimate 57 (L) >60 mL/min/[1.73_m2]    GFR Estimate If Black 66 >60 mL/min/[1.73_m2]    Calcium 8.6 8.5 - 10.1 mg/dL   Lactic acid whole blood   Result Value Ref Range    Lactic Acid 1.8 0.7 - 2.0 mmol/L   INR   Result Value Ref Range    INR 1.34 (H) 0.86 - 1.14   Creatinine POCT   Result Value Ref Range    Creatinine 1.4 (H) 0.66 - 1.25 mg/dL    GFR Estimate 50 (L) >60 mL/min/[1.73_m2]    GFR Estimate If Black 61 >60 mL/min/[1.73_m2]   Hemoglobin A1c   Result Value Ref Range    Hemoglobin A1C 9.0  (H) 0 - 5.6 %               All imaging studies reviewed by me.         Patient`s old medical records reviewed and case discussed with the ED physician.    ED course-Reviewed

## 2019-05-05 NOTE — PHARMACY-VANCOMYCIN DOSING SERVICE
Pharmacy Vancomycin Initial Note  Date of Service May 5, 2019  Patient's  1950  68 year old, male    Indication: Bone and Joint Infection    Current estimated CrCl = Estimated Creatinine Clearance: 76.2 mL/min (A) (based on SCr of 1.28 mg/dL (H)).    Creatinine for last 3 days  2019:  2:18 AM Creatinine 1.28 mg/dL    Recent Vancomycin Level(s) for last 3 days  No results found for requested labs within last 72 hours.      Vancomycin IV Administrations (past 72 hours)                   vancomycin (VANCOCIN) 2,000 mg in sodium chloride 0.9 % 500 mL intermittent infusion (mg) 2,000 mg Given 19 0417                Nephrotoxins and other renal medications (From now, onward)    Start     Dose/Rate Route Frequency Ordered Stop    19 1600  vancomycin (VANCOCIN) 2,000 mg in sodium chloride 0.9 % 500 mL intermittent infusion      2,000 mg  over 2 Hours Intravenous EVERY 12 HOURS 19 0611      19 0900  ampicillin-sulbactam (UNASYN) 3 g vial to attach to  mL bag      3 g  over 1 Hours Intravenous EVERY 6 HOURS 19 0540      19 0800  furosemide (LASIX) tablet 80 mg      80 mg Oral DAILY 19 0540      19 0800  lisinopril (PRINIVIL/ZESTRIL) tablet 5 mg      5 mg Oral DAILY 19 0540            Contrast Orders - past 72 hours (72h ago, onward)    Start     Dose/Rate Route Frequency Ordered Stop    19 0247  iopamidol (ISOVUE-370) solution 500 mL      500 mL Intravenous ONCE 19 0246 19 0256                Plan:  1.  Start vancomycin  2000 mg IV q12h.   2.  Goal Trough Level: 15-20 mg/L   3.  Pharmacy will check trough levels as appropriate in 1-3 Days.    4. Serum creatinine levels will be ordered daily for the first week of therapy and at least twice weekly for subsequent weeks.    5. Sizerock method utilized to dose vancomycin therapy: Method 2    Kirby Stephen [facility]

## 2019-05-05 NOTE — PLAN OF CARE
"Alert, orientated. Pleasant. Right foot/lower leg redness outlined, swelling, warm to touch. Scab to bottom of right 3rd toe. Plan is for surgery tomorrow 1730. Declined shower this morning. IV took 5 sticks in the ER- will run TKO. IV Unasyn. Blood sugar checks. Denies pain. Vitals stable. Up with 1 assist and walker for safety. Blood cultures pending. Following plan of care.   1323  Diabetic herbal patch on his umbilicus. \"Helps with blood sugars. It works. Patch is only good for 2 days. I will take it off tomorrow (Monday)\" Just to note a alternative herbal patch he has on. We talked through to remove but patient declined at this time to take off. Resting between cares this day shift.     "

## 2019-05-06 ENCOUNTER — APPOINTMENT (OUTPATIENT)
Dept: GENERAL RADIOLOGY | Facility: CLINIC | Age: 69
DRG: 617 | End: 2019-05-06
Attending: PODIATRIST
Payer: COMMERCIAL

## 2019-05-06 ENCOUNTER — ANESTHESIA (OUTPATIENT)
Dept: SURGERY | Facility: CLINIC | Age: 69
DRG: 617 | End: 2019-05-06
Payer: COMMERCIAL

## 2019-05-06 ENCOUNTER — ANESTHESIA EVENT (OUTPATIENT)
Dept: SURGERY | Facility: CLINIC | Age: 69
DRG: 617 | End: 2019-05-06
Payer: COMMERCIAL

## 2019-05-06 PROBLEM — Z98.890 POST-OPERATIVE STATE: Status: ACTIVE | Noted: 2019-05-06

## 2019-05-06 LAB
ANION GAP SERPL CALCULATED.3IONS-SCNC: 2 MMOL/L (ref 3–14)
BASOPHILS # BLD AUTO: 0 10E9/L (ref 0–0.2)
BASOPHILS NFR BLD AUTO: 0.2 %
BUN SERPL-MCNC: 20 MG/DL (ref 7–30)
CALCIUM SERPL-MCNC: 8.2 MG/DL (ref 8.5–10.1)
CHLORIDE SERPL-SCNC: 105 MMOL/L (ref 94–109)
CO2 SERPL-SCNC: 31 MMOL/L (ref 20–32)
CREAT SERPL-MCNC: 1.23 MG/DL (ref 0.66–1.25)
DIFFERENTIAL METHOD BLD: ABNORMAL
EOSINOPHIL # BLD AUTO: 0.2 10E9/L (ref 0–0.7)
EOSINOPHIL NFR BLD AUTO: 4.8 %
ERYTHROCYTE [DISTWIDTH] IN BLOOD BY AUTOMATED COUNT: 13 % (ref 10–15)
GFR SERPL CREATININE-BSD FRML MDRD: 60 ML/MIN/{1.73_M2}
GLUCOSE BLDC GLUCOMTR-MCNC: 193 MG/DL (ref 70–99)
GLUCOSE BLDC GLUCOMTR-MCNC: 204 MG/DL (ref 70–99)
GLUCOSE BLDC GLUCOMTR-MCNC: 205 MG/DL (ref 70–99)
GLUCOSE BLDC GLUCOMTR-MCNC: 212 MG/DL (ref 70–99)
GLUCOSE BLDC GLUCOMTR-MCNC: 239 MG/DL (ref 70–99)
GLUCOSE BLDC GLUCOMTR-MCNC: 256 MG/DL (ref 70–99)
GLUCOSE BLDC GLUCOMTR-MCNC: 347 MG/DL (ref 70–99)
GLUCOSE SERPL-MCNC: 228 MG/DL (ref 70–99)
HCT VFR BLD AUTO: 33.5 % (ref 40–53)
HGB BLD-MCNC: 11 G/DL (ref 13.3–17.7)
IMM GRANULOCYTES # BLD: 0 10E9/L (ref 0–0.4)
IMM GRANULOCYTES NFR BLD: 0.2 %
LYMPHOCYTES # BLD AUTO: 1.1 10E9/L (ref 0.8–5.3)
LYMPHOCYTES NFR BLD AUTO: 26.1 %
MCH RBC QN AUTO: 28.9 PG (ref 26.5–33)
MCHC RBC AUTO-ENTMCNC: 32.8 G/DL (ref 31.5–36.5)
MCV RBC AUTO: 88 FL (ref 78–100)
MONOCYTES # BLD AUTO: 0.7 10E9/L (ref 0–1.3)
MONOCYTES NFR BLD AUTO: 15.8 %
NEUTROPHILS # BLD AUTO: 2.3 10E9/L (ref 1.6–8.3)
NEUTROPHILS NFR BLD AUTO: 52.9 %
NRBC # BLD AUTO: 0 10*3/UL
NRBC BLD AUTO-RTO: 0 /100
PLATELET # BLD AUTO: 202 10E9/L (ref 150–450)
POTASSIUM SERPL-SCNC: 3.8 MMOL/L (ref 3.4–5.3)
RBC # BLD AUTO: 3.8 10E12/L (ref 4.4–5.9)
SODIUM SERPL-SCNC: 138 MMOL/L (ref 133–144)
WBC # BLD AUTO: 4.4 10E9/L (ref 4–11)

## 2019-05-06 PROCEDURE — 88311 DECALCIFY TISSUE: CPT | Performed by: PODIATRIST

## 2019-05-06 PROCEDURE — 25000132 ZZH RX MED GY IP 250 OP 250 PS 637: Performed by: INTERNAL MEDICINE

## 2019-05-06 PROCEDURE — 37000009 ZZH ANESTHESIA TECHNICAL FEE, EACH ADDTL 15 MIN: Performed by: PODIATRIST

## 2019-05-06 PROCEDURE — 99232 SBSQ HOSP IP/OBS MODERATE 35: CPT | Performed by: INTERNAL MEDICINE

## 2019-05-06 PROCEDURE — 27210794 ZZH OR GENERAL SUPPLY STERILE: Performed by: PODIATRIST

## 2019-05-06 PROCEDURE — 25000125 ZZHC RX 250: Performed by: INTERNAL MEDICINE

## 2019-05-06 PROCEDURE — 25000128 H RX IP 250 OP 636: Performed by: PODIATRIST

## 2019-05-06 PROCEDURE — 25000128 H RX IP 250 OP 636: Performed by: NURSE ANESTHETIST, CERTIFIED REGISTERED

## 2019-05-06 PROCEDURE — 40000986 XR FOOT PORT RT 3 VW: Mod: RT

## 2019-05-06 PROCEDURE — 40000306 ZZH STATISTIC PRE PROC ASSESS II: Performed by: PODIATRIST

## 2019-05-06 PROCEDURE — 88305 TISSUE EXAM BY PATHOLOGIST: CPT | Performed by: PODIATRIST

## 2019-05-06 PROCEDURE — 36415 COLL VENOUS BLD VENIPUNCTURE: CPT | Performed by: INTERNAL MEDICINE

## 2019-05-06 PROCEDURE — 25800030 ZZH RX IP 258 OP 636: Performed by: NURSE ANESTHETIST, CERTIFIED REGISTERED

## 2019-05-06 PROCEDURE — 28825 PARTIAL AMPUTATION OF TOE: CPT | Mod: T7 | Performed by: PODIATRIST

## 2019-05-06 PROCEDURE — 25000125 ZZHC RX 250: Performed by: NURSE ANESTHETIST, CERTIFIED REGISTERED

## 2019-05-06 PROCEDURE — 93010 ELECTROCARDIOGRAM REPORT: CPT | Performed by: INTERNAL MEDICINE

## 2019-05-06 PROCEDURE — 25000128 H RX IP 250 OP 636: Performed by: INTERNAL MEDICINE

## 2019-05-06 PROCEDURE — 25000132 ZZH RX MED GY IP 250 OP 250 PS 637: Performed by: PODIATRIST

## 2019-05-06 PROCEDURE — 12000000 ZZH R&B MED SURG/OB

## 2019-05-06 PROCEDURE — 71000013 ZZH RECOVERY PHASE 1 LEVEL 1 EA ADDTL HR: Performed by: PODIATRIST

## 2019-05-06 PROCEDURE — 25000131 ZZH RX MED GY IP 250 OP 636 PS 637: Performed by: INTERNAL MEDICINE

## 2019-05-06 PROCEDURE — 37000008 ZZH ANESTHESIA TECHNICAL FEE, 1ST 30 MIN: Performed by: PODIATRIST

## 2019-05-06 PROCEDURE — 71000012 ZZH RECOVERY PHASE 1 LEVEL 1 FIRST HR: Performed by: PODIATRIST

## 2019-05-06 PROCEDURE — 85025 COMPLETE CBC W/AUTO DIFF WBC: CPT | Performed by: INTERNAL MEDICINE

## 2019-05-06 PROCEDURE — 00000146 ZZHCL STATISTIC GLUCOSE BY METER IP

## 2019-05-06 PROCEDURE — 36000056 ZZH SURGERY LEVEL 3 1ST 30 MIN: Performed by: PODIATRIST

## 2019-05-06 PROCEDURE — 25800030 ZZH RX IP 258 OP 636: Performed by: INTERNAL MEDICINE

## 2019-05-06 PROCEDURE — 0Y6U0Z3 DETACHMENT AT LEFT 3RD TOE, LOW, OPEN APPROACH: ICD-10-PCS | Performed by: PODIATRIST

## 2019-05-06 PROCEDURE — 36000058 ZZH SURGERY LEVEL 3 EA 15 ADDTL MIN: Performed by: PODIATRIST

## 2019-05-06 PROCEDURE — 80048 BASIC METABOLIC PNL TOTAL CA: CPT | Performed by: INTERNAL MEDICINE

## 2019-05-06 PROCEDURE — 99207 ZZC CDG-MDM COMPONENT: MEETS LOW - DOWN CODED: CPT | Performed by: INTERNAL MEDICINE

## 2019-05-06 RX ORDER — DEXTROSE MONOHYDRATE 25 G/50ML
25-50 INJECTION, SOLUTION INTRAVENOUS
Status: DISCONTINUED | OUTPATIENT
Start: 2019-05-06 | End: 2019-05-06

## 2019-05-06 RX ORDER — LIDOCAINE HYDROCHLORIDE 10 MG/ML
INJECTION, SOLUTION INFILTRATION; PERINEURAL PRN
Status: DISCONTINUED | OUTPATIENT
Start: 2019-05-06 | End: 2019-05-06

## 2019-05-06 RX ORDER — PROPOFOL 10 MG/ML
INJECTION, EMULSION INTRAVENOUS PRN
Status: DISCONTINUED | OUTPATIENT
Start: 2019-05-06 | End: 2019-05-06

## 2019-05-06 RX ORDER — NEOSTIGMINE METHYLSULFATE 1 MG/ML
VIAL (ML) INJECTION PRN
Status: DISCONTINUED | OUTPATIENT
Start: 2019-05-06 | End: 2019-05-06

## 2019-05-06 RX ORDER — GLYCOPYRROLATE 0.2 MG/ML
INJECTION, SOLUTION INTRAMUSCULAR; INTRAVENOUS PRN
Status: DISCONTINUED | OUTPATIENT
Start: 2019-05-06 | End: 2019-05-06

## 2019-05-06 RX ORDER — HYDRALAZINE HYDROCHLORIDE 20 MG/ML
2.5-5 INJECTION INTRAMUSCULAR; INTRAVENOUS EVERY 10 MIN PRN
Status: DISCONTINUED | OUTPATIENT
Start: 2019-05-06 | End: 2019-05-06 | Stop reason: HOSPADM

## 2019-05-06 RX ORDER — NICOTINE POLACRILEX 4 MG
15-30 LOZENGE BUCCAL
Status: DISCONTINUED | OUTPATIENT
Start: 2019-05-06 | End: 2019-05-06

## 2019-05-06 RX ORDER — NALOXONE HYDROCHLORIDE 0.4 MG/ML
.1-.4 INJECTION, SOLUTION INTRAMUSCULAR; INTRAVENOUS; SUBCUTANEOUS
Status: ACTIVE | OUTPATIENT
Start: 2019-05-06 | End: 2019-05-07

## 2019-05-06 RX ORDER — ONDANSETRON 4 MG/1
4 TABLET, ORALLY DISINTEGRATING ORAL EVERY 30 MIN PRN
Status: DISCONTINUED | OUTPATIENT
Start: 2019-05-06 | End: 2019-05-06 | Stop reason: HOSPADM

## 2019-05-06 RX ORDER — BUPIVACAINE HYDROCHLORIDE 5 MG/ML
INJECTION, SOLUTION EPIDURAL; INTRACAUDAL PRN
Status: DISCONTINUED | OUTPATIENT
Start: 2019-05-06 | End: 2019-05-06 | Stop reason: HOSPADM

## 2019-05-06 RX ORDER — LIDOCAINE 40 MG/G
CREAM TOPICAL
Status: DISCONTINUED | OUTPATIENT
Start: 2019-05-06 | End: 2019-05-07 | Stop reason: HOSPADM

## 2019-05-06 RX ORDER — ACETAMINOPHEN 325 MG/1
650 TABLET ORAL EVERY 4 HOURS PRN
Status: DISCONTINUED | OUTPATIENT
Start: 2019-05-09 | End: 2019-05-07 | Stop reason: HOSPADM

## 2019-05-06 RX ORDER — SODIUM CHLORIDE, SODIUM LACTATE, POTASSIUM CHLORIDE, CALCIUM CHLORIDE 600; 310; 30; 20 MG/100ML; MG/100ML; MG/100ML; MG/100ML
INJECTION, SOLUTION INTRAVENOUS CONTINUOUS
Status: DISCONTINUED | OUTPATIENT
Start: 2019-05-06 | End: 2019-05-06 | Stop reason: HOSPADM

## 2019-05-06 RX ORDER — LABETALOL 20 MG/4 ML (5 MG/ML) INTRAVENOUS SYRINGE
10
Status: DISCONTINUED | OUTPATIENT
Start: 2019-05-06 | End: 2019-05-06 | Stop reason: HOSPADM

## 2019-05-06 RX ORDER — ONDANSETRON 2 MG/ML
4 INJECTION INTRAMUSCULAR; INTRAVENOUS EVERY 30 MIN PRN
Status: DISCONTINUED | OUTPATIENT
Start: 2019-05-06 | End: 2019-05-06 | Stop reason: HOSPADM

## 2019-05-06 RX ORDER — DIMENHYDRINATE 50 MG/ML
25 INJECTION, SOLUTION INTRAMUSCULAR; INTRAVENOUS
Status: DISCONTINUED | OUTPATIENT
Start: 2019-05-06 | End: 2019-05-06 | Stop reason: HOSPADM

## 2019-05-06 RX ORDER — FENTANYL CITRATE 50 UG/ML
25-50 INJECTION, SOLUTION INTRAMUSCULAR; INTRAVENOUS
Status: DISCONTINUED | OUTPATIENT
Start: 2019-05-06 | End: 2019-05-06 | Stop reason: HOSPADM

## 2019-05-06 RX ORDER — SODIUM CHLORIDE, SODIUM LACTATE, POTASSIUM CHLORIDE, CALCIUM CHLORIDE 600; 310; 30; 20 MG/100ML; MG/100ML; MG/100ML; MG/100ML
INJECTION, SOLUTION INTRAVENOUS CONTINUOUS PRN
Status: DISCONTINUED | OUTPATIENT
Start: 2019-05-06 | End: 2019-05-06

## 2019-05-06 RX ORDER — DEXTROSE MONOHYDRATE 25 G/50ML
25-50 INJECTION, SOLUTION INTRAVENOUS
Status: DISCONTINUED | OUTPATIENT
Start: 2019-05-06 | End: 2019-05-07 | Stop reason: HOSPADM

## 2019-05-06 RX ORDER — HYDROMORPHONE HYDROCHLORIDE 1 MG/ML
.3-.5 INJECTION, SOLUTION INTRAMUSCULAR; INTRAVENOUS; SUBCUTANEOUS EVERY 5 MIN PRN
Status: DISCONTINUED | OUTPATIENT
Start: 2019-05-06 | End: 2019-05-06 | Stop reason: HOSPADM

## 2019-05-06 RX ORDER — NICOTINE POLACRILEX 4 MG
15-30 LOZENGE BUCCAL
Status: DISCONTINUED | OUTPATIENT
Start: 2019-05-06 | End: 2019-05-07 | Stop reason: HOSPADM

## 2019-05-06 RX ORDER — ACETAMINOPHEN 325 MG/1
975 TABLET ORAL EVERY 8 HOURS
Status: DISCONTINUED | OUTPATIENT
Start: 2019-05-06 | End: 2019-05-07 | Stop reason: HOSPADM

## 2019-05-06 RX ORDER — TRAMADOL HYDROCHLORIDE 50 MG/1
50 TABLET ORAL EVERY 6 HOURS PRN
Status: DISCONTINUED | OUTPATIENT
Start: 2019-05-06 | End: 2019-05-07 | Stop reason: HOSPADM

## 2019-05-06 RX ORDER — LIDOCAINE HYDROCHLORIDE 20 MG/ML
10 JELLY TOPICAL ONCE
Status: COMPLETED | OUTPATIENT
Start: 2019-05-06 | End: 2019-05-06

## 2019-05-06 RX ORDER — NALOXONE HYDROCHLORIDE 0.4 MG/ML
.1-.4 INJECTION, SOLUTION INTRAMUSCULAR; INTRAVENOUS; SUBCUTANEOUS
Status: DISCONTINUED | OUTPATIENT
Start: 2019-05-06 | End: 2019-05-07 | Stop reason: HOSPADM

## 2019-05-06 RX ORDER — DEXAMETHASONE SODIUM PHOSPHATE 4 MG/ML
INJECTION, SOLUTION INTRA-ARTICULAR; INTRALESIONAL; INTRAMUSCULAR; INTRAVENOUS; SOFT TISSUE PRN
Status: DISCONTINUED | OUTPATIENT
Start: 2019-05-06 | End: 2019-05-06

## 2019-05-06 RX ORDER — NALOXONE HYDROCHLORIDE 0.4 MG/ML
.1-.4 INJECTION, SOLUTION INTRAMUSCULAR; INTRAVENOUS; SUBCUTANEOUS
Status: DISCONTINUED | OUTPATIENT
Start: 2019-05-06 | End: 2019-05-06

## 2019-05-06 RX ORDER — FENTANYL CITRATE 50 UG/ML
INJECTION, SOLUTION INTRAMUSCULAR; INTRAVENOUS PRN
Status: DISCONTINUED | OUTPATIENT
Start: 2019-05-06 | End: 2019-05-06

## 2019-05-06 RX ORDER — DEXTROSE MONOHYDRATE, SODIUM CHLORIDE, AND POTASSIUM CHLORIDE 50; 1.49; 4.5 G/1000ML; G/1000ML; G/1000ML
INJECTION, SOLUTION INTRAVENOUS CONTINUOUS
Status: DISCONTINUED | OUTPATIENT
Start: 2019-05-06 | End: 2019-05-07 | Stop reason: HOSPADM

## 2019-05-06 RX ADMIN — ACETAMINOPHEN 975 MG: 325 TABLET, FILM COATED ORAL at 19:11

## 2019-05-06 RX ADMIN — DEXAMETHASONE SODIUM PHOSPHATE 4 MG: 4 INJECTION, SOLUTION INTRA-ARTICULAR; INTRALESIONAL; INTRAMUSCULAR; INTRAVENOUS; SOFT TISSUE at 16:26

## 2019-05-06 RX ADMIN — LIDOCAINE HYDROCHLORIDE 50 MG: 10 INJECTION, SOLUTION INFILTRATION; PERINEURAL at 16:26

## 2019-05-06 RX ADMIN — Medication 3 MG: at 16:42

## 2019-05-06 RX ADMIN — SODIUM CHLORIDE, PRESERVATIVE FREE: 5 INJECTION INTRAVENOUS at 09:05

## 2019-05-06 RX ADMIN — GLYCOPYRROLATE 0.6 MG: 0.2 INJECTION, SOLUTION INTRAMUSCULAR; INTRAVENOUS at 16:42

## 2019-05-06 RX ADMIN — PANTOPRAZOLE SODIUM 40 MG: 40 TABLET, DELAYED RELEASE ORAL at 07:48

## 2019-05-06 RX ADMIN — FUROSEMIDE 80 MG: 40 TABLET ORAL at 07:48

## 2019-05-06 RX ADMIN — ONDANSETRON 4 MG: 2 INJECTION INTRAMUSCULAR; INTRAVENOUS at 16:26

## 2019-05-06 RX ADMIN — LEVOTHYROXINE SODIUM 137 MCG: 25 TABLET ORAL at 22:01

## 2019-05-06 RX ADMIN — MIDAZOLAM 1 MG: 1 INJECTION INTRAMUSCULAR; INTRAVENOUS at 16:19

## 2019-05-06 RX ADMIN — AMPICILLIN SODIUM AND SULBACTAM SODIUM 3 G: 2; 1 INJECTION, POWDER, FOR SOLUTION INTRAMUSCULAR; INTRAVENOUS at 03:22

## 2019-05-06 RX ADMIN — LISINOPRIL 5 MG: 5 TABLET ORAL at 07:48

## 2019-05-06 RX ADMIN — GLYCOPYRROLATE 0.2 MG: 0.2 INJECTION, SOLUTION INTRAMUSCULAR; INTRAVENOUS at 16:26

## 2019-05-06 RX ADMIN — CARVEDILOL 25 MG: 25 TABLET, FILM COATED ORAL at 19:11

## 2019-05-06 RX ADMIN — AMPICILLIN SODIUM AND SULBACTAM SODIUM 3 G: 2; 1 INJECTION, POWDER, FOR SOLUTION INTRAMUSCULAR; INTRAVENOUS at 09:05

## 2019-05-06 RX ADMIN — ATORVASTATIN CALCIUM 40 MG: 40 TABLET, FILM COATED ORAL at 19:11

## 2019-05-06 RX ADMIN — POTASSIUM CHLORIDE 10 MEQ: 750 TABLET, FILM COATED, EXTENDED RELEASE ORAL at 19:11

## 2019-05-06 RX ADMIN — AMPICILLIN SODIUM AND SULBACTAM SODIUM 3 G: 2; 1 INJECTION, POWDER, FOR SOLUTION INTRAMUSCULAR; INTRAVENOUS at 20:20

## 2019-05-06 RX ADMIN — SODIUM CHLORIDE, POTASSIUM CHLORIDE, SODIUM LACTATE AND CALCIUM CHLORIDE: 600; 310; 30; 20 INJECTION, SOLUTION INTRAVENOUS at 15:54

## 2019-05-06 RX ADMIN — POTASSIUM CHLORIDE 10 MEQ: 750 TABLET, FILM COATED, EXTENDED RELEASE ORAL at 07:48

## 2019-05-06 RX ADMIN — MIDAZOLAM 1 MG: 1 INJECTION INTRAMUSCULAR; INTRAVENOUS at 16:21

## 2019-05-06 RX ADMIN — LIDOCAINE HYDROCHLORIDE 10 ML: 20 JELLY TOPICAL at 22:01

## 2019-05-06 RX ADMIN — ROCURONIUM BROMIDE 30 MG: 10 INJECTION INTRAVENOUS at 16:26

## 2019-05-06 RX ADMIN — PHENYLEPHRINE HYDROCHLORIDE 200 MCG: 10 INJECTION, SOLUTION INTRAMUSCULAR; INTRAVENOUS; SUBCUTANEOUS at 16:40

## 2019-05-06 RX ADMIN — PROPOFOL 200 MG: 10 INJECTION, EMULSION INTRAVENOUS at 16:26

## 2019-05-06 RX ADMIN — FENTANYL CITRATE 100 MCG: 50 INJECTION, SOLUTION INTRAMUSCULAR; INTRAVENOUS at 16:26

## 2019-05-06 RX ADMIN — AMPICILLIN SODIUM AND SULBACTAM SODIUM 3 G: 2; 1 INJECTION, POWDER, FOR SOLUTION INTRAMUSCULAR; INTRAVENOUS at 14:22

## 2019-05-06 RX ADMIN — INSULIN GLARGINE 20 UNITS: 100 INJECTION, SOLUTION SUBCUTANEOUS at 07:48

## 2019-05-06 RX ADMIN — CARVEDILOL 25 MG: 25 TABLET, FILM COATED ORAL at 07:48

## 2019-05-06 RX ADMIN — ISOSORBIDE MONONITRATE 30 MG: 30 TABLET, EXTENDED RELEASE ORAL at 07:48

## 2019-05-06 ASSESSMENT — ENCOUNTER SYMPTOMS
STRIDOR: 0
DYSRHYTHMIAS: 1

## 2019-05-06 ASSESSMENT — ACTIVITIES OF DAILY LIVING (ADL)
ADLS_ACUITY_SCORE: 14
ADLS_ACUITY_SCORE: 12
ADLS_ACUITY_SCORE: 12
ADLS_ACUITY_SCORE: 13
ADLS_ACUITY_SCORE: 12

## 2019-05-06 ASSESSMENT — LIFESTYLE VARIABLES: TOBACCO_USE: 1

## 2019-05-06 NOTE — PLAN OF CARE
Alert, orientated x4. Plan is surgery today 1830 with Dr. Mckeon. Pleasant. Denies pain. Swelling and redness to right foot. Redness to right lower leg. Redness has decreased from outlined line. Scab to 3rd toe posterior side. Band-Aid applied. NPO after 0900. Blood sugar checks. Voiding. Up in chair all morning. Independent in the room. Showered for upcoming surgery. Following plan of care. IV Unasyn.

## 2019-05-06 NOTE — CONSULTS
"Care Transition Initial Assessment - RN        Met with: Patient and Family.  DATA   Active Problems:    Acute osteomyelitis (H)       Cognitive Status: awake and alert.        Contact information and PCP information verified: Pt notes that he follows with a Cardiologist, Dr. Mccullough and [facility] as wewll as Endocrinology, Dr. Weaver , But I do not have a Primary doctor.\" Pt is not interested in this as he feels he is careed for well with the Specilities. Talked about [facility] for local base, he was not aware  had a clinic in [place].  Provided him withthe information \"he will consider It\"  Expressed that Dr. Mckeon, jeff doing his surgery has clinic hours at that location as well and this woudl be benefical to him as he does not drive and depends on his kids or Spouse whom works full time.   Lives With: spouse         Description of Support System: Supportive, Involved   Who is your support system?: Wife   Support Assessment: Adequate family and caregiver support, Adequate social supports   Insurance concerns: No Insurance issues identified  ASSESSMENT  Identified issues/concerns regarding health management: CTS following up with [facility] pt 2/2/ to a second admission in 3 months for DM toe ulcer requiring surgery.     Pt notes that he has been actively trying to reduce his A1C and has been successful in doing so from 11.7 down to 9.0. He states that he has all of the necessary supplies as well as medications.  He is followed closely by Dr. Weaver, endocrinology.   He also [facility] and Cardiologist , dr. Ferris . Follows low /no salt and daily wt. Recommendations. He is not identifying any GAP in care.  At this time.     PLAN    Patient given options and choices for discharge Talked about [facility] for establishing PCP , he will consider it, he will likely follow up with Dr. Mckeon pn discharge at that location he lives in [place] and was not aware of Podiatry " there.      Patient anticipates discharging to Home    Appointments: TBD     Care  (CTS) will continue to follow as needed.

## 2019-05-06 NOTE — BRIEF OP NOTE
[facility]    Brief Operative Note    Pre-operative diagnosis: Diabetic foot ulcer  Post-operative diagnosis same  Procedure: Procedure(s):  PARTIAL THIRD TOE AMPUTATION  Surgeon: Surgeon(s) and Role:     * Татьяна Mckeon DPM, Podiatry/Foot and Ankle Surgery - Primary   First Assist: Mary Jo Sorenson DPM  Anesthesia: General   Estimated blood loss: Minimal  Drains: None  Specimens:   ID Type Source Tests Collected by Time Destination   A : Right distal third toe Tissue Toe SURGICAL PATHOLOGY EXAM Татьяна Mckeon DPM, Podiatry/Foot and Ankle Surgery 5/6/2019  4:37 PM      Findings:   None.  Complications: None.  Implants:  * No implants in log *

## 2019-05-06 NOTE — ANESTHESIA PREPROCEDURE EVALUATION
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     Cardiomyopathy (H)      Cellulitis of left lower extremity 3/13/2018     Cerebral infarction (H)     eye sight decreased in peripheral of right side and blurry     Diabetic ulcer of left midfoot associated with type 2 diabetes mellitus, with fat layer exposed (H) 3/13/2018     Essential hypertension, benign 2002     Generalized osteoarthrosis, unspecified site 2002     Microalbuminuria 3/13/2018    X1     Myocardial infarction (H)      Neuropathy      Sepsis (H)      Sleep apnea     doesn't use it     Substance abuse (H)      Syncope, unspecified syncope type 3/13/2018     Thyroid disease     takes medicine     Tobacco use disorder 2002     Type 2 diabetes mellitus with diabetic peripheral angiopathy without gangrene, unspecified long term insulin use status      Past Surgical History:   Procedure Laterality Date     AMPUTATE TOE(S) Right 2019    Procedure: Right open second toe partial amputation;  Surgeon: Sabino Garcia DPM;  Location: [facility] OR     AMPUTATE TOE(S) Right 2019    Procedure: 1.  Revision right second toe amputation with resection of distal half of proximal phalanx with full closure.    2.  Debridement of callus/preulcerative lesion, distal left second toe and plantar left first metatarsophalangeal joint.;  Surgeon: Ryan Bhagat DPM;  Location: [facility] OR     AMPUTATE TOE(S) Right 3/12/2019    Procedure: Partial right fourth toe amputation.;  Surgeon: Ryan hBagat DPM;  Location: [facility] OR     ANGIOGRAM  05    subtotal occ.mid LAD,SUSAN mid LAD     ANGIOGRAM      mild CAD,patent  stent,no flow-limiting lesions,sev.LV dysf.LAD enlarged     ANGIOGRAM  2/09    Sev.single vessel disease,Mod LV dysf.distal LAD 90%,70-75% mid lad just before prev stent,SUSAN to prox.mid LAD, endeavor to distal LAD     ANGIOGRAM  11/13/13    restenosis, stent LAD     BACK SURGERY      back surgery x3     C NONSPECIFIC PROCEDURE      Laminectomy x 3 - (1983 x 2 & 1990)     C NONSPECIFIC PROCEDURE Bilateral 1998    Bunionectomy     C NONSPECIFIC PROCEDURE  1959    Gingival surgery at age 9     HEART CATH LEFT HEART CATH  06/13/2017    SUSAN to OM3     INCISION AND DRAINAGE TOE, COMBINED Bilateral 9/11/2018    Procedure: COMBINED INCISION AND DRAINAGE TOE;  1) Irrigation and debridement ulcer left great toe  2) Amputation 3rd toe right foot at distal interphalangeal joint level;  Surgeon: Miki Harp MD;  Location: [facility] OR     IRRIGATION AND DEBRIDEMENT FOOT, COMBINED Left 3/12/2019    Procedure: Debridement of left foot ulcer sub first MPJ 2 x 2.5 cm down to and including subcutaneous tissue, callus debridement for preulcerative lesion, left second toe.;  Surgeon: Ryan Bhagat DPM;  Location: [facility] OR     ORTHOPEDIC SURGERY      bunion surgery both feet     ORTHOPEDIC SURGERY      left shoulder     SOFT TISSUE SURGERY      debridement of toe numerous time     VASCULAR SURGERY      7 stents in heart     Anesthesia Evaluation     . Pt has had prior anesthetic. Type: General and MAC    No history of anesthetic complications          ROS/MED HX    ENT/Pulmonary:     (+)sleep apnea, tobacco use, doesn't use CPAP , . .    Neurologic:     (+)CVA     Cardiovascular:     (+) hypertension--CAD, -past MI,-stent,. Taking blood thinners : . CHF Last EF: 35-40 . fainting (syncope). :. dysrhythmias a-fib, . Previous cardiac testing Echodate:1/19results:(EF 35-40%) reduced left ventricular function is present. Regional  wall abnormality involving the septum, apex and inferior walls. No LV apical  thrombus.  Global right  ventricular function is normal. The right ventricle is normal  size.  The inferior vena cava was dilated at 2.6 cm without respiratory variability,  consistent with increased right atrial pressure.  Estimated mean right atrial pressure is 15 mmHg (significantly elevated).  No significant valvular dysfunction.  No pericardial effusion is present.  Rhythm was atrial fibrillation during the study.date: results:ECG reviewed date:1/19 results:A-fib.  RBBB.  Ant and inf Qs. date: results:         (-) angina and angina   METS/Exercise Tolerance:     Hematologic:         Musculoskeletal:         GI/Hepatic:  - neg GI/hepatic ROS       Renal/Genitourinary:         Endo:     (+) type II DM Using insulin thyroid problem .   (-) Type I DM   Psychiatric:         Infectious Disease:   (+) Other Infectious Disease feet      Malignancy:         Other:                          Physical Exam  Normal systems: cardiovascular, pulmonary and dental    Airway   Mallampati: II  TM distance: >3 FB  Neck ROM: full    Dental     Cardiovascular   Rhythm and rate: regular and normal  (-) no friction rub, no systolic click and no murmur    Pulmonary    breath sounds clear to auscultation(-) no rhonchi, no decreased breath sounds, no wheezes, no rales and no stridor            Lab Results   Component Value Date    WBC 4.4 05/06/2019    HGB 11.0 (L) 05/06/2019    HCT 33.5 (L) 05/06/2019     05/06/2019    CRP 81.6 (H) 09/08/2018    SED 25 (H) 03/10/2019     05/06/2019    POTASSIUM 3.8 05/06/2019    CHLORIDE 105 05/06/2019    CO2 31 05/06/2019    BUN 20 05/06/2019    CR 1.23 05/06/2019     (H) 05/06/2019    BETTIE 8.2 (L) 05/06/2019    MAG 2.4 (H) 01/25/2019    ALBUMIN 3.3 (L) 01/17/2019    PROTTOTAL 7.5 01/17/2019    ALT 17 01/17/2019    AST 10 01/17/2019    ALKPHOS 77 01/17/2019    BILITOTAL 1.2 01/17/2019    LIPASE 23 11/02/2010    PTT 31 06/09/2017    INR 1.34 (H) 05/05/2019    TSH 0.40 09/06/2018       Preop Vitals  BP Readings  "from Last 3 Encounters:   05/06/19 136/90   04/23/19 110/68   04/12/19 100/66    Pulse Readings from Last 3 Encounters:   05/05/19 106   04/12/19 82   03/13/19 87      Resp Readings from Last 3 Encounters:   05/06/19 16   03/15/19 20   01/25/19 18    SpO2 Readings from Last 3 Encounters:   05/06/19 97%   04/12/19 96%   03/15/19 97%      Temp Readings from Last 1 Encounters:   05/06/19 96.4  F (35.8  C) (Oral)    Ht Readings from Last 1 Encounters:   05/05/19 1.93 m (6' 4\")      Wt Readings from Last 1 Encounters:   05/05/19 113.5 kg (250 lb 4.8 oz)    Estimated body mass index is 30.47 kg/m  as calculated from the following:    Height as of this encounter: 1.93 m (6' 4\").    Weight as of this encounter: 113.5 kg (250 lb 4.8 oz).       Anesthesia Plan      History & Physical Review  History and physical reviewed and following examination; no interval change.    ASA Status:  3 .    NPO Status:  > 8 hours    Plan for General and ETT with Intravenous and Propofol induction. Maintenance will be Balanced.    PONV prophylaxis:  Ondansetron (or other 5HT-3)       Postoperative Care  Postoperative pain management:  IV analgesics.      Consents  Anesthetic plan, risks, benefits and alternatives discussed with:  Patient.  Use of blood products discussed: Yes.   Use of blood products discussed with Patient.  Consented to blood products.  .                 Vinod Christensen MD                    .  "

## 2019-05-06 NOTE — PROGRESS NOTES
Pt alert and oriented, VSS, up independently in the room.Tolerating Mod. Carb diet, +ve bowel sounds, passing flatus.Saline lock. Dressing is clean dry and intact(Bandaid dressing to right 3rd toe). Pt will be NPO after 9am. Surgery scheduled at 1830.Voiding adequate amounts using the urinal tonight.On abx Unasyn and Vanco. BS 2am 205. Will continue to monitor.

## 2019-05-06 NOTE — PROGRESS NOTES
[facility]    Hospitalist Progress Note  Provider : Betito Crespo MD  Date of Service (when I saw the patient): 05/06/2019    Assessment & Plan   Brief summary of admission assessment:Jaryo Elder is a 68 year old  male with a significant past medical history of diabetes with previous amputations who presents with foot ulcer with surrounding redness.  Patient follows with Dr. Montano and had partial fourth right toe amputation, as well as I&D of the left foot ulcer and callus debridement of the left second toe on 3/12/2019.  ED evaluation revealed CT evidence of previous 2nd, 3rd, and 4th toe amputations. Erosion along dorsal middle phalaynx suspicious for osteomyelitis. Patient was given intravenous injection of vancomycin and Unasyn and was admitted to our service for further care.     1.  Right foot cellulitis: Patient is currently Unasyn.  He was evaluated by podiatry. CT of right foot reviewed by podiatry and no evidence of bone infection noted. There is partial right third toe ulcer which is prone to further breakdown or risk of bone infection. Podiatry planning to do partial right third toe amputation today.     2. S/P  I&D of the left foot ulcer and callus debridement of the left second toe on 3/12/2019    4.DM - insulin requiring.  He was given Lantus insulin 20 units this morning.  He was put on ISS Q4 hours while n.p.o.  Will resume his regimen once back on oral intake.     5. Anticoagulation with Apixaban for PAF: Anticoagulation on hold will resume when okay with     6. Ischemic cardiomyopathy: On Coreg, Lasix, Imdur, lisinopril.     7. Hypothyroidism: Will continue Synthroid     8.Hypertension: Will continue Coreg, lisinopril.  Will monitor blood pressure     9. Calluses/chronic infection of the left foot ulcer     DVT Prophylaxis: Pneumatic Compression Devices    Code Status: Full Code    Disposition: Expected discharge in 1-2 days    Betito Crespo  MD    Interval History   Patient seen and examined. He denies any nausea.  No abdominal pain    -Data reviewed today: I reviewed all new labs and imaging results over the last 24 hours. I personally reviewed    Physical Exam   Temp: 96.4  F (35.8  C) Temp src: Oral BP: 136/90   Heart Rate: 74 Resp: 16 SpO2: 97 % O2 Device: None (Room air)    Vitals:    05/05/19 0130 05/05/19 0500   Weight: 112.4 kg (247 lb 12.8 oz) 113.5 kg (250 lb 4.8 oz)     Vital Signs with Ranges  Temp:  [96.4  F (35.8  C)-98.4  F (36.9  C)] 96.4  F (35.8  C)  Heart Rate:  [74-86] 74  Resp:  [16-18] 16  BP: (116-138)/(65-90) 136/90  SpO2:  [95 %-97 %] 97 %  I/O last 3 completed shifts:  In: 1220 [P.O.:1220]  Out: 1800 [Urine:1800]    GEN:  Alert, oriented x 3, appears comfortable, NAD.  HEENT:  Normocephalic/atraumatic, no scleral icterus, no nasal discharge, mouth moist.  CV:  Regular rate and rhythm, no murmur or JVD.  S1 + S2 noted, no S3 or S4.  LUNGS:  Clear to auscultation bilaterally without rales/rhonchi/wheezing/retractions.  Symmetric chest rise on inhalation noted.  ABD:  Active bowel sounds, soft, non-tender/non-distended.  No rebound/guarding/rigidity.  EXT:  Superficial laceration of plantar aspect of the third toe. Mild erythema surrounding the ulcer.  Evidence of multiple toe amputations  SKIN:  Dry to touch, no exanthems noted in the visualized areas.  NEURO:  Symmetric muscle strength, sensation to touch grossly intact.  No new focal deficits appreciated.    Medications     Patient RECEIVING antibiotic to treat a different condition and it provides ADEQUATE COVERAGE for this surgical procedure.       sodium chloride 20 mL/hr at 05/06/19 0905       ampicillin-sulbactam (UNASYN) IV  3 g Intravenous Q6H     atorvastatin  40 mg Oral QPM     carvedilol  25 mg Oral BID w/meals     furosemide  80 mg Oral Daily     insulin aspart  1-6 Units Subcutaneous Q4H     insulin glargine  20 Units Subcutaneous Once     insulin glargine  36 Units  Subcutaneous QAM     isosorbide mononitrate  30 mg Oral Daily     levothyroxine  137 mcg Oral At Bedtime     lisinopril  5 mg Oral Daily     pantoprazole  40 mg Oral QAM AC     potassium chloride ER  10 mEq Oral BID       Data   Recent Labs   Lab 05/06/19  0619 05/05/19  0218   WBC 4.4 9.2   HGB 11.0* 11.7*   MCV 88 89    223   INR  --  1.34*    137   POTASSIUM 3.8 3.9   CHLORIDE 105 102   CO2 31 32   BUN 20 22   CR 1.23 1.28*   ANIONGAP 2* 3   BETTIE 8.2* 8.6   * 212*       No results found for this or any previous visit (from the past 24 hour(s)).

## 2019-05-06 NOTE — ANESTHESIA POSTPROCEDURE EVALUATION
Patient: Jayro Ortegaw    Procedure(s):  PARTIAL THIRD TOE AMPUTATION    Diagnosis:unknown  Diagnosis Additional Information: Diabetic foot ulcer    Anesthesia Type:  General, ETT    Note:  Anesthesia Post Evaluation    Patient location during evaluation: PACU  Patient participation: Able to fully participate in evaluation  Level of consciousness: sleepy but conscious  Pain management: adequate  Airway patency: patent  Cardiovascular status: acceptable  Respiratory status: acceptable  Hydration status: acceptable  PONV: controlled     Anesthetic complications: None          Last vitals:  Vitals:    05/06/19 0717 05/06/19 1600 05/06/19 1700   BP: 136/90 137/86 123/79   Pulse:   83   Resp: 16  12   Temp: 96.4  F (35.8  C) 97.8  F (36.6  C) 97.9  F (36.6  C)   SpO2: 97% 97% 100%         Electronically Signed By: Timothy Miner MD  May 6, 2019  5:14 PM

## 2019-05-06 NOTE — PROGRESS NOTES
Diabetic pt,  at 2200. Partial amputation scheduled for 1700 tomorrow, NPO after 0900. Bilateral numbness and tingling at baseline in all extremities, lower worse than upper. Refused to shower as he was worried about getting herbal patch wet. Ambulating to the bathroom and using the urinal at times. IV unasyn in use, denies pain. Will keep monitoring.

## 2019-05-06 NOTE — ANESTHESIA CARE TRANSFER NOTE
Patient: Jayro Elder    Procedure(s):  PARTIAL THIRD TOE AMPUTATION    Diagnosis: unknown  Diagnosis Additional Information: No value filed.    Anesthesia Type:   General, ETT     Note:  Airway :Face Mask  Patient transferred to:PACU  Handoff Report: Identifed the Patient, Identified the Reponsible Provider, Reviewed the pertinent medical history, Discussed the surgical course, Reviewed Intra-OP anesthesia mangement and issues during anesthesia, Set expectations for post-procedure period and Allowed opportunity for questions and acknowledgement of understanding      Vitals: (Last set prior to Anesthesia Care Transfer)    CRNA VITALS  5/6/2019 1623 - 5/6/2019 1657      5/6/2019             Pulse:  67    SpO2:  100 %    Resp Rate (observed):  10                Electronically Signed By: LORRAINE Munson CRNA  May 6, 2019  4:57 PM

## 2019-05-07 VITALS
HEIGHT: 76 IN | BODY MASS INDEX: 30.24 KG/M2 | WEIGHT: 248.3 LBS | DIASTOLIC BLOOD PRESSURE: 81 MMHG | HEART RATE: 76 BPM | RESPIRATION RATE: 16 BRPM | SYSTOLIC BLOOD PRESSURE: 124 MMHG | OXYGEN SATURATION: 96 % | TEMPERATURE: 96.5 F

## 2019-05-07 LAB
ANION GAP SERPL CALCULATED.3IONS-SCNC: 7 MMOL/L (ref 3–14)
BASOPHILS # BLD AUTO: 0 10E9/L (ref 0–0.2)
BASOPHILS NFR BLD AUTO: 0.2 %
BUN SERPL-MCNC: 25 MG/DL (ref 7–30)
CALCIUM SERPL-MCNC: 9.2 MG/DL (ref 8.5–10.1)
CHLORIDE SERPL-SCNC: 101 MMOL/L (ref 94–109)
CO2 SERPL-SCNC: 27 MMOL/L (ref 20–32)
CREAT SERPL-MCNC: 1.21 MG/DL (ref 0.66–1.25)
DIFFERENTIAL METHOD BLD: ABNORMAL
EOSINOPHIL # BLD AUTO: 0 10E9/L (ref 0–0.7)
EOSINOPHIL NFR BLD AUTO: 0 %
ERYTHROCYTE [DISTWIDTH] IN BLOOD BY AUTOMATED COUNT: 12.8 % (ref 10–15)
GFR SERPL CREATININE-BSD FRML MDRD: 61 ML/MIN/{1.73_M2}
GLUCOSE BLDC GLUCOMTR-MCNC: 242 MG/DL (ref 70–99)
GLUCOSE BLDC GLUCOMTR-MCNC: 246 MG/DL (ref 70–99)
GLUCOSE BLDC GLUCOMTR-MCNC: 276 MG/DL (ref 70–99)
GLUCOSE BLDC GLUCOMTR-MCNC: 319 MG/DL (ref 70–99)
GLUCOSE SERPL-MCNC: 258 MG/DL (ref 70–99)
HCT VFR BLD AUTO: 35.2 % (ref 40–53)
HGB BLD-MCNC: 11.9 G/DL (ref 13.3–17.7)
IMM GRANULOCYTES # BLD: 0 10E9/L (ref 0–0.4)
IMM GRANULOCYTES NFR BLD: 0.3 %
LYMPHOCYTES # BLD AUTO: 0.7 10E9/L (ref 0.8–5.3)
LYMPHOCYTES NFR BLD AUTO: 10.2 %
MCH RBC QN AUTO: 29.2 PG (ref 26.5–33)
MCHC RBC AUTO-ENTMCNC: 33.8 G/DL (ref 31.5–36.5)
MCV RBC AUTO: 86 FL (ref 78–100)
MONOCYTES # BLD AUTO: 0.3 10E9/L (ref 0–1.3)
MONOCYTES NFR BLD AUTO: 4 %
NEUTROPHILS # BLD AUTO: 5.6 10E9/L (ref 1.6–8.3)
NEUTROPHILS NFR BLD AUTO: 85.3 %
NRBC # BLD AUTO: 0 10*3/UL
NRBC BLD AUTO-RTO: 0 /100
PLATELET # BLD AUTO: 235 10E9/L (ref 150–450)
POTASSIUM SERPL-SCNC: 4 MMOL/L (ref 3.4–5.3)
RBC # BLD AUTO: 4.08 10E12/L (ref 4.4–5.9)
SODIUM SERPL-SCNC: 135 MMOL/L (ref 133–144)
WBC # BLD AUTO: 6.6 10E9/L (ref 4–11)

## 2019-05-07 PROCEDURE — 36415 COLL VENOUS BLD VENIPUNCTURE: CPT | Performed by: PODIATRIST

## 2019-05-07 PROCEDURE — 00000146 ZZHCL STATISTIC GLUCOSE BY METER IP

## 2019-05-07 PROCEDURE — 25000131 ZZH RX MED GY IP 250 OP 636 PS 637: Performed by: PODIATRIST

## 2019-05-07 PROCEDURE — 25000132 ZZH RX MED GY IP 250 OP 250 PS 637: Performed by: PODIATRIST

## 2019-05-07 PROCEDURE — 25800030 ZZH RX IP 258 OP 636: Performed by: PODIATRIST

## 2019-05-07 PROCEDURE — 85025 COMPLETE CBC W/AUTO DIFF WBC: CPT | Performed by: PODIATRIST

## 2019-05-07 PROCEDURE — 99239 HOSP IP/OBS DSCHRG MGMT >30: CPT | Performed by: INTERNAL MEDICINE

## 2019-05-07 PROCEDURE — 80048 BASIC METABOLIC PNL TOTAL CA: CPT | Performed by: PODIATRIST

## 2019-05-07 PROCEDURE — 25000128 H RX IP 250 OP 636: Performed by: PODIATRIST

## 2019-05-07 RX ORDER — LIDOCAINE HYDROCHLORIDE 20 MG/ML
10 JELLY TOPICAL ONCE
Status: DISCONTINUED | OUTPATIENT
Start: 2019-05-07 | End: 2019-05-07 | Stop reason: HOSPADM

## 2019-05-07 RX ADMIN — PANTOPRAZOLE SODIUM 40 MG: 40 TABLET, DELAYED RELEASE ORAL at 09:01

## 2019-05-07 RX ADMIN — LISINOPRIL 5 MG: 5 TABLET ORAL at 09:01

## 2019-05-07 RX ADMIN — AMPICILLIN SODIUM AND SULBACTAM SODIUM 3 G: 2; 1 INJECTION, POWDER, FOR SOLUTION INTRAMUSCULAR; INTRAVENOUS at 17:07

## 2019-05-07 RX ADMIN — AMPICILLIN SODIUM AND SULBACTAM SODIUM 3 G: 2; 1 INJECTION, POWDER, FOR SOLUTION INTRAMUSCULAR; INTRAVENOUS at 09:02

## 2019-05-07 RX ADMIN — INSULIN GLARGINE 36 UNITS: 100 INJECTION, SOLUTION SUBCUTANEOUS at 09:09

## 2019-05-07 RX ADMIN — POTASSIUM CHLORIDE 10 MEQ: 750 TABLET, FILM COATED, EXTENDED RELEASE ORAL at 09:01

## 2019-05-07 RX ADMIN — CARVEDILOL 25 MG: 25 TABLET, FILM COATED ORAL at 09:01

## 2019-05-07 RX ADMIN — SODIUM CHLORIDE, PRESERVATIVE FREE: 5 INJECTION INTRAVENOUS at 07:04

## 2019-05-07 RX ADMIN — FUROSEMIDE 80 MG: 40 TABLET ORAL at 09:01

## 2019-05-07 RX ADMIN — AMPICILLIN SODIUM AND SULBACTAM SODIUM 3 G: 2; 1 INJECTION, POWDER, FOR SOLUTION INTRAMUSCULAR; INTRAVENOUS at 03:09

## 2019-05-07 RX ADMIN — ISOSORBIDE MONONITRATE 30 MG: 30 TABLET, EXTENDED RELEASE ORAL at 09:01

## 2019-05-07 ASSESSMENT — ACTIVITIES OF DAILY LIVING (ADL)
ADLS_ACUITY_SCORE: 13
ADLS_ACUITY_SCORE: 12
ADLS_ACUITY_SCORE: 13

## 2019-05-07 ASSESSMENT — MIFFLIN-ST. JEOR: SCORE: 1997.78

## 2019-05-07 NOTE — PLAN OF CARE
Pt discharged facility at 1830 with spouse.  All discharge instructions were reviewed and pt denied questions or concerns.  Medications and belongings were sent along with patient and pt was escorted in w/c to vehicle by staff.

## 2019-05-07 NOTE — PROGRESS NOTES
Pt alert and oriented, lng elevated bp,On RA with capno. Diastolic in the 90'sounds clear. Up with SBA.Tolerating regular diet.+ve bowel sounds, passing flatus.Denies pain. Dressing is clean dry and intact.Baseline biilateral numbness and tingling.On abx ancef q6hrs.  @ 2am.Pt unable to void BS.

## 2019-05-07 NOTE — DISCHARGE SUMMARY
[facility]    Discharge Summary  Hospitalist    Date of Admission:  5/5/2019  Date of Discharge:  5/7/2019  Discharging Provider: Betito Crespo MD  Date of Service (when I saw the patient): 05/07/19    Discharge Diagnoses      1.  Right foot cellulitis: improved    2. Right third toe ulceration, diabetic neuropathy s/p right partial third toe amputation.     3. S/P  I&D of the left foot ulcer and callus debridement of the left second toe on 3/12/2019    4. DM - insulin requiring     5. Anticoagulation with Apixaban for PAF     6. Ischemic cardiomyopathy     7. Hypothyroidism     8. Hypertension     9. Calluses/chronic infection of the left foot ulcer     History of Present Illness   Jayro Elder is an 68 year old male who presented with right foot redness and third toe ulcer.    Hospital Course   Brief summary of admission assessment: Jayro Elder is a 68 year old  male with a significant past medical history of diabetes with previous amputations who presents with foot ulcer with surrounding redness. Patient follows with Dr. Montano and had partial fourth right toe amputation, as well as I&D of the left foot ulcer and callus debridement of the left second toe on 3/12/2019.  ED evaluation revealed CT evidence of previous 2nd, 3rd, and 4th toe amputations. Erosion along dorsal middle phalaynx suspicious for osteomyelitis. Patient was given intravenous injection of vancomycin and Unasyn and was admitted to our service for further care.Podiatry was consulted and he underwent right partial third toe amputation.      1.  Right foot cellulitis: Patient is currently Unasyn.    -- He was put on IV Unasyn. His cellulitis improved. Antibiotic was changed to oral Augmentin to complete course of treatment.    2. Right third toe ulceration, diabetic neuropathy   CT of right foot reviewed by podiatry and no evidence of bone infection noted. There is partial right third toe ulcer which is  prone to further breakdown or risk of bone infection. Podiatry consulted and he underwent partial right third toe amputation on 5/6. He tolerated the procedure well.     3. S/P  I&D of the left foot ulcer and callus debridement of the left second toe on 3/12/2019    4.DM - insulin requiring. He was initially put on ISS Q4 hours while n.p.o.  He was restarted on his home regimen with Lantus insulin 36 units qam, Novolog 8 units tid, insulin sliding scale. His blood sugar was monitored.     5. Anticoagulation with Apixaban for PAF: Anticoagulation was initially held. He was restarted on Eliquis on discharge.     6. Ischemic cardiomyopathy: On Coreg, Lasix, Imdur, lisinopril.     7. Hypothyroidism: He was continued on Synthroid     8. Hypertension: He was continued on Coreg, lisinopril.  Will monitor blood pressure     9. Calluses/chronic infection of the left foot ulcer    Patient remained stable. He was discharged with a plan to follow up with podiatry and PCP as outpatient.     Significant Results and Procedures   Results for orders placed or performed during the hospital encounter of 05/05/19   CT Foot Right w Contrast    Narrative    CT RIGHT FOOT WITH CONTRAST   5/5/2019 2:54 AM    HISTORY: Osteomyelitis suspected, foot swelling, diabetic.    COMPARISON: Radiographs on 3/12/2019.    TECHNIQUE: CT imaging was performed through the right foot following  the uneventful intravenous administration of 100 mL of Isovue-370  contrast. Radiation dose for this scan was reduced using automated  exposure control, adjustment of the mA and/or kV according to patient  size, or iterative reconstruction technique.    FINDINGS: There has been amputation of the distal phalanges of the  second and fourth toes. No acute fracture or osseous lesion is seen.  No definite cortical erosive changes are seen to suggest  osteomyelitis. There are prominent degenerative changes in the  midfoot. No abnormal soft tissue mass is seen. There  appears to be  edema and swelling of the soft tissues of the third toe. No other soft  tissue abnormality is seen.      Impression    IMPRESSION: Amputation of the second and fourth toes. There is soft  tissue edema around the third toe consistent with cellulitis. There is  no evidence of an abscess or osteomyelitis.     TALHA OTTO MD   X-ray rt Foot 3 vw port: In PACU    Narrative    RIGHT FOOT THREE VIEWS   5/6/2019 5:37 PM     HISTORY: Postop state.    COMPARISON: 3/12/2019.      Impression    IMPRESSION: There have been amputations of the second, third, and  fourth toes. There is marked hallux valgus deformity with severe  degenerative changes of the first MTP joint. Marked midfoot  degenerative change. Plantar calcaneal spur is present. No acute  fracture or dislocation.     KEILY MCGEE MD         Pending Results   None  Code Status   Full Code       Primary Care Physician   Physician No Ref-Primary    0    Discharge Disposition   Discharged to home  Condition at discharge: Stable    Consultations This Hospital Stay   PODIATRY IP CONSULT  PHARMACY TO DOSE VANCO  CARE COORDINATOR IP CONSULT  PHARMACY DISCHARGE EDUCATION BY PHARMACIST    Time Spent on this Encounter   I, Betito Crespo MD, personally saw the patient today and spent greater than 30 minutes discharging this patient.    Discharge Orders      Follow-up and recommended labs and tests     Follow up with DR. Mckeon or Dr. Bhagat in 1 week from discharge from hospital.     For APPOINTMENTS: [phone].  For questions or concerns: call: [phone]     Activity    Your activity upon discharge: minimal heel weight bearing in short cast boot     Wound care and dressings    Instructions to care for your wound at home:     Keep foot and dressing dry. Will change at first clinic visit.     Reason for your hospital stay    Right foot cellulitis, right third toe ulcer s/p surgery     Follow-up and recommended labs and tests      Follow up with primary care provider, Physician No Ref-Primary, within 7 days  Follow up with podiatry as scheduled     Diet    Follow this diet upon discharge: Orders Placed This Encounter      Moderate Consistent CHO Diet     Discharge Medications   Current Discharge Medication List      START taking these medications    Details   amoxicillin-clavulanate (AUGMENTIN) 875-125 MG tablet Take 1 tablet by mouth 2 times daily for 5 days  Qty: 10 tablet, Refills: 0    Associated Diagnoses: Cellulitis of toe of right foot         CONTINUE these medications which have NOT CHANGED    Details   apixaban ANTICOAGULANT (ELIQUIS) 5 MG tablet Take 1 tablet (5 mg) by mouth 2 times daily    Associated Diagnoses: Chronic atrial fibrillation (H)      atorvastatin (LIPITOR) 40 MG tablet Take 1 tablet (40 mg) by mouth every evening  Qty: 90 tablet, Refills: 3    Associated Diagnoses: Cerebrovascular accident (CVA) due to embolism of left posterior cerebral artery (H)      carvedilol (COREG) 25 MG tablet Take 1 tablet (25 mg) by mouth 2 times daily (with meals)  Qty: 180 tablet, Refills: 3    Associated Diagnoses: Acute on chronic systolic congestive heart failure (H); Hypertension goal BP (blood pressure) < 140/90      Cholecalciferol (VITAMIN D3) 2000 units TABS Take 1 tablet by mouth daily      furosemide (LASIX) 80 MG tablet Take 1 tablet (80 mg) by mouth daily  Qty: 90 tablet, Refills: 1    Associated Diagnoses: Chronic systolic congestive heart failure (H)      !! insulin aspart (NOVOLOG FLEXPEN) 100 UNIT/ML pen Inject Subcutaneous 3 times daily (with meals) Sliding Scale  Do not give sliding scale insulin if Pre-Meal BG less than 140.   For Pre-Meal:   - 200 give 2 units.    - 250 give 4 units.    - 300 give 6 units.    - 350 give 8 units.    - 400 give 10 units.      !! insulin aspart (NOVOLOG PEN) 100 UNIT/ML pen Inject 8 Units Subcutaneous 3 times daily (with meals)      insulin glargine (LANTUS  SOLOSTAR PEN) 100 UNIT/ML pen Inject 36 Units Subcutaneous every morning      isosorbide mononitrate (IMDUR) 30 MG 24 hr tablet Take 1 tablet (30 mg) by mouth daily  Qty: 90 tablet, Refills: 1    Associated Diagnoses: Hypertension goal BP (blood pressure) < 140/90      levothyroxine (SYNTHROID, LEVOTHROID) 137 MCG tablet Take 137 mcg by mouth At Bedtime   Qty: 90 tablet, Refills: 3      lisinopril (PRINIVIL/ZESTRIL) 5 MG tablet Take 1 tablet (5 mg) by mouth daily  Qty: 90 tablet, Refills: 3    Associated Diagnoses: Acute on chronic systolic congestive heart failure (H); Chronic atrial fibrillation (H)      nitroglycerin (NITROSTAT) 0.4 MG sublingual tablet Place 1 tablet (0.4 mg) under the tongue every 5 minutes as needed for chest pain  Qty: 50 tablet, Refills: 0    Associated Diagnoses: Other chest pain      pantoprazole (PROTONIX) 40 MG enteric coated tablet Take 1 tablet (40 mg) by mouth every morning (before breakfast)  Qty: 30 tablet, Refills: 0    Associated Diagnoses: GERD (gastroesophageal reflux disease)      potassium chloride ER (K-DUR/KLOR-CON M) 10 MEQ CR tablet Take 1 tablet (10 mEq) by mouth 2 times daily  Qty: 180 tablet, Refills: 0    Associated Diagnoses: Acute systolic heart failure (H)      insulin pen needle 31G X 6 MM Use  as directed.  Qty: 100 each, Refills: prn    Associated Diagnoses: Type 2 diabetes mellitus with diabetic peripheral angiopathy without gangrene, unspecified long term insulin use status      !! order for DME Equipment being ordered: Other: surgical shoe male XL  Treatment Diagnosis: sp right 2nd toe amputaion  Qty: 1 Device, Refills: 0    Associated Diagnoses: S/P amputation of lesser toe, right (H)      !! order for DME Equipment being ordered: Walker Wheels () and Walker ()  Treatment Diagnosis: Impaired gait, heel WB bilaterally.  Qty: 1 each, Refills: 0    Associated Diagnoses: Diabetic ulcer of left midfoot associated with diabetes mellitus of other type,  unspecified ulcer stage (H)       !! - Potential duplicate medications found. Please discuss with provider.            Allergies   Allergies   Allergen Reactions     No Known Allergies      Data   Most Recent 3 CBC's:  Recent Labs   Lab Test 05/07/19  0732 05/06/19  0619 05/05/19  0218   WBC 6.6 4.4 9.2   HGB 11.9* 11.0* 11.7*   MCV 86 88 89    202 223      Most Recent 3 BMP's:  Recent Labs   Lab Test 05/07/19  0732 05/06/19  0619 05/05/19  0218    138 137   POTASSIUM 4.0 3.8 3.9   CHLORIDE 101 105 102   CO2 27 31 32   BUN 25 20 22   CR 1.21 1.23 1.28*   ANIONGAP 7 2* 3   BETTIE 9.2 8.2* 8.6   * 228* 212*     Most Recent 2 LFT's:  Recent Labs   Lab Test 01/17/19  1652 01/13/19  0702   AST 10 10   ALT 17 15   ALKPHOS 77 66   BILITOTAL 1.2 0.5     Most Recent INR's and Anticoagulation Dosing History:  Anticoagulation Dose History     Recent Dosing and Labs Latest Ref Rng & Units 11/3/2010 6/9/2017 9/6/2018 1/18/2019 1/19/2019 1/20/2019 5/5/2019    INR 0.86 - 1.14 1.03 1.18(H) 1.36(H) 1.28(H) 1.40(H) 1.43(H) 1.34(H)        Most Recent 3 Troponin's:  Recent Labs   Lab Test 01/17/19  1652 01/10/19  1443 01/10/19  0643   TROPI <0.015 0.019 0.021     Most Recent Cholesterol Panel:  Recent Labs   Lab Test 01/18/19  0611   CHOL 81   LDL 33   HDL 32*   TRIG 83     Most Recent 6 Bacteria Isolates From Any Culture (See EPIC Reports for Culture Details):  Recent Labs   Lab Test 05/05/19  0437 05/05/19  0219 03/12/19  1642 01/17/19  1903 01/06/19  1436 01/05/19  1708   CULT No growth after 2 days No growth after 2 days Heavy growth  Anaerococcus species  No further identification  Susceptibility testing not routinely done  *  Heavy growth  Anaerobic gram positive cocci  No further identification  Susceptibility testing not routinely done  *  Heavy growth  Staphylococcus aureus  *  Light growth  Enterococcus faecalis  * No growth Light growth  Peptoniphilus asaccharolyticus  Susceptibility testing not routinely  done  *  Light growth  Clostridium species, not perfringens  Susceptibility testing not routinely done  *  Heavy growth  Finegoldia magna (Peptostreptococcus jennifer)  Susceptibility testing not routinely done  *  Heavy growth  Streptococcus dysgalactiae serogroup C/G  *  Moderate growth  Staphylococcus lugdunensis  *  Light growth  Streptococcus dysgalactiae serogroup C/G  Susceptibility testing done on previous specimen  *  Single colony  Coagulase negative Staphylococcus  Susceptibility testing not routinely done  These bacteria are part of normal skin michael, but on occasion, may be true pathogens.    Clinical correlation must be applied to interpreting this microbiology result.  * No growth     Most Recent TSH, T4 and A1c Labs:  Recent Labs   Lab Test 05/05/19  0218  09/06/18  1542   TSH  --   --  0.40   A1C 9.0*   < > 9.4*    < > = values in this interval not displayed.

## 2019-05-07 NOTE — PLAN OF CARE
VS-down to the OR at 1545 back to the floor at 1830. Stable, capnography on.   Lung Sounds-clear, no cough, taking deep breathes,   O2-on room air,   GI-+bs, -flatus after surgery, lbm was today prior to surgery. Tolerating reg diet. No nausea. Blood sugar was 212 after surgery. Pt took 3 units of insulin. Pt normally also takes 8 units of insulin with each meal--paged MD on call to reorder since he is eating well after surgery.    -due to void. Bladder scanned pt x 2. Pt tried to void standing at side of the bed. Unsuccessful. Pt walked into the bathroom to try--still unsuccessful. St cathed at 2200 for 850 ml, some blood tinged urine out. Used urojet.   IVF-at tko. Pt is very hard iv stick.   Dressings-ace wrapped R foot to mid shin. Clean dry and intact. Elevated. Pt has cam shoe from previous. Here in room.   CMS-numbness baseline in both legs from mid shin to toes, strong dorsi/planter flexion. +2 pulse prior to surgery. R foot is covered now and unable to assess. Swelling noted in R foot/ankle leg prior to surgery. 2+.   Drain-none  Activity- up with one assist to stand at side of bed. Ambulated to the bathroom and back to bed.   Pain-denies pain. Taking scheduled tylenol.   D/C Plan-home when able. On iv  unasyn.

## 2019-05-07 NOTE — PROVIDER NOTIFICATION
Pt had surgery today. blood sugars ordered every 4 hours (npo most of day today) can you change before meals and at bed? also, blood sugars high. Dr Dalton was going to add carb counting --pt takes 8 units/meal --does at home. can you order that as well. pt eating reg tray.

## 2019-05-07 NOTE — OP NOTE
Procedure Date: 05/06/2019      SURGEON:  Татьяна Mcekon DPM        FIRST ASSISTANT:  Mary Jo Sorenson DPM      PREOPERATIVE DIAGNOSES:   1.  Diabetic neuropathy.   2.  Ulceration, right third toe.      POSTOPERATIVE DIAGNOSES:   1.  Diabetic neuropathy.   2.  Ulceration, right third toe.      PROCEDURE:  Right partial third toe amputation.      ANESTHESIA:  MAC with local.      HEMOSTASIS:  None.      ESTIMATED BLOOD LOSS:  Less than 5 mL.      SPECIMENS:  Partial right third toe for pathology.      MATERIALS:  None.      INDICATIONS:  Mr. Elder is a 68-year-old diabetic male that has had previous multiple partial toe amputations in the past.  He presented to the hospital with redness to the right foot and ulceration to the right third toe.  MRI was obtained, which did not show any signs of bone infection; however, this toe does stick out farther than the remaining toes as he has had partial amputations on the second, fourth and fifth toes previously.  It was discussed with patient to go in and remove part of the toe to make a more even parabola and try to prevent pressure area.  Risks, benefits and complications were discussed with the patient.  No guarantees were made.  He wishes to proceed with surgery.      OPERATIVE PROCEDURE:  The patient was brought to the operating room, placed on the operating table in a supine position.  Anesthesia was administered and local was injected.  The foot was prepped and draped using sterile technique.  Attention was directed to the distal aspect of the right third toe.  A fishmouth incision was made around the distal 1/2 of the right third toe full thickness down to bone with a #15 blade.  The toe was disarticulated at the proximal interphalangeal joint and sent for pathology.  The distal head of the proximal phalanx was removed using bone rongeur and the wound was flushed with copious amounts of normal saline.  The skin was reapproximated using 4-0 Prolene.  The patient's  foot was placed in a dry sterile dressing.  The patient tolerated the procedure and anesthesia well and was transferred to recovery with vital signs stable and vascular status intact.  The patient has a more even parabola now of the foot without a prominent toe to try to prevent further pressure areas and ulceration.         ROMARIO ASENCIO DPM             D: 2019   T: 2019   MT: SAMANTHA      Name:     PORTER YANCEY   MRN:      [id number]        Account:        [id number]   :      1950           Procedure Date: 2019      Document: [id number]

## 2019-05-07 NOTE — PROGRESS NOTES
"Foot & Ankle Surgery  May 7, 2019    Patient seen at bedside this PM POD#1 sp R 3rd toe partial amputation by Dr Mckeon.  Minimal pain, no acute concerns.  Resting in a chair in the 6th floor lobby.    /84 (BP Location: Right arm)   Pulse 76   Temp 97  F (36.1  C) (Oral)   Resp 16   Ht 1.93 m (6' 4\")   Wt 112.6 kg (248 lb 4.8 oz)   SpO2 96%   BMI 30.22 kg/m      PE - R 3rd toe - sutures intact, skin margins well coapted.  No drainage, necrosis or dehiscence.      Imaging - IMPRESSION: There have been amputations of the second, third, and  fourth toes. There is marked hallux valgus deformity with severe  degenerative changes of the first MTP joint. Marked midfoot  degenerative change. Plantar calcaneal spur is present. No acute  fracture or dislocation.       A/P - 69 yo neuropathic DMII male POD#1 sp R 3rd toe partial amputation  -dressing change at bedside, incision healing quite well.  -discussed heel WB in surgical shoe and resting/elevating instruction  -Dr Mckeon has placed discharge orders.  Ok for discharge from our standpoint.  Will sign off, please call with questions or acute changes to patient/wound status.    Ryan Bhagat DPM FACFAS FACFAOM  Podiatric Foot & Ankle Surgeon  [facility]  [phone]          "

## 2019-05-07 NOTE — PLAN OF CARE
Pt has been up independently with bilateral boots on.  Pt has denied pain. Pt was straight cathed on robe shift and he has been voiding small amount 200- 250 ml at a time. Pt states that when he was straight cathed he feels there was some trauma in that area.  Pt states it hurts when he voids.  Pt refuses to be scanned or straight cathed this shift.  Pt denied having any discomfort or pressure after voids until this afternoon.  Pt states he feels pressure but will not let us cath him.  Pt requesting another dose of lasix.  MD notified .  Pts dressing were changed by podiatry this shift.

## 2019-05-08 ENCOUNTER — TELEPHONE (OUTPATIENT)
Dept: FAMILY MEDICINE | Facility: CLINIC | Age: 69
End: 2019-05-08

## 2019-05-08 NOTE — TELEPHONE ENCOUNTER
Please call patient for Inpatient Discharge f/u 05/07/19. Other Acute Osteomyelitis Of Right Foot. Cellulitis Of Toe Of Right Foot. Ruth Behrens.

## 2019-05-09 LAB — COPATH REPORT: NORMAL

## 2019-05-10 LAB — INTERPRETATION ECG - MUSE: NORMAL

## 2019-05-22 ENCOUNTER — OFFICE VISIT (OUTPATIENT)
Dept: PODIATRY | Facility: CLINIC | Age: 69
End: 2019-05-22
Payer: COMMERCIAL

## 2019-05-22 VITALS
DIASTOLIC BLOOD PRESSURE: 72 MMHG | WEIGHT: 248 LBS | HEIGHT: 76 IN | BODY MASS INDEX: 30.2 KG/M2 | SYSTOLIC BLOOD PRESSURE: 128 MMHG

## 2019-05-22 DIAGNOSIS — Z98.890 POST-OPERATIVE STATE: Primary | ICD-10-CM

## 2019-05-22 DIAGNOSIS — E11.42 DIABETIC POLYNEUROPATHY ASSOCIATED WITH TYPE 2 DIABETES MELLITUS (H): ICD-10-CM

## 2019-05-22 DIAGNOSIS — Z89.422 H/O AMPUTATION OF LESSER TOE, LEFT (H): ICD-10-CM

## 2019-05-22 PROCEDURE — 99024 POSTOP FOLLOW-UP VISIT: CPT | Performed by: PODIATRIST

## 2019-05-22 ASSESSMENT — MIFFLIN-ST. JEOR: SCORE: 1996.42

## 2019-05-22 NOTE — PATIENT INSTRUCTIONS
Thank you for choosing [facility]!    DR. ASENCIO'S CLINIC SCHEDULE  MONDAY AM - [place] TUESDAY - [facility]   [address] [address] [place] [address] [place], [place] [id number] [phone] / [phone] / [phone]       WEDNESDAY - [place] FRIDAY AM - W[facility]   [address]   [place] [address] [place] [id number] [phone] / [phone]       FRIDAY PM - [place] SCHEDULE SURGERY: [phone]   [address] BILLING QUESTIONS: [phone]   [place] [address] AFTER HOURS: [phone]   [phone] / [phone] APPOINTMENTS: [phone]     Consumer Price Line (CPL) [phone]       One month follow up      [facility] LOCATIONS  [facility]  [address]  [place] [address]  Phone: [phone]  Fax: [phone] [facility]  [address]  [place] [address]  Phone: [phone]   Fax: [phone]   [facility]  [address]  [place] [address]  Phone: [phone]  Fax: [phone] [facility]  [address]  [place] [address]  Phone: [phone]   Fax: [phone]   [facility]   [address]  [place] [address]  Phone: [phone]   Fax: [phone] * Please call any location listed to make an appointment for a casting/fitting. Your referral was sent to their central office and they will all have the order on file.     WEARING YOUR CUSTOM FOOT ORTHOTICS   Most insurance plans cover one pair of orthotics per year. You must check with your   insurance plan to see what your payment responsibility will be. Please call your   insurance company by calling the number on the back of your insurance card.   Orthotic's are non-refundable and non-returnable.   Orthotics are made of  various designs. Some orthotics are covered with material that extends beyond your toes. If your orthotic is of this design, you will likely need to trim the toe end to get a proper fit. The insole from your shoe can be used as a template. Simply overlay the shoe insert on top of the custom orthotic. Align the heel end while tracing the length of the insert onto the custom orthotic. Use a large scissor to trim the toe end until you get a proper fit in the shoe.   The orthotic needs to be pushed as far back in the shoe as possible. The heel portion should not ride forward so as not to irritate your heel.   Orthotics are designed to work with socks. Excessive perspiration will shorten the life span of the orthotics. Remove the orthotic from the shoe frequently for proper drying.   The break-in period lasts for weeks. People new to orthotics will likely experience new aches and pains. The orthotic is forcing your foot into a new position. Arch, foot and leg muscle aches and fatigue are common during these weeks. Minor discomfort can be considered normal break in phenomenon. Start wearing your orthotic around your home your first day. Limited activity for one to two hours is recommended. You can increase one or two additional hours each day provided the aches and pains are subsiding. The degree of discomfort, fatigue and problems will dictate the speed of break in. You may require multiple weeks to work up to full time use.   Do not continue wearing your orthotics if they are creating problems such as blisters or sores. Do not hesitate to call the clinic to speak with a nurse regarding orthotic   break in, fit, trimming, etc. You may also need to see the doctor if the orthotics are   simply not working out. Adjustments are sometimes made to improve orthotic   function.     Orthotics will only work in certain styles and types of shoes. Orthotics rarely work in dress shoes. Slip-ons, clogs, sandals and heels are  particularly troublesome. Specially designed orthotics may be necessary for these types of shoes. Your custom orthotic was designed for activities that require appropriate walking or running shoes. Lace up athletic shoes, walking shoes or work boots should work appropriately. You may need a wider or longer shoe. Shoes with a removable  or insert work best. In general, you want to remove an insert from the shoe before placing the orthotic into the shoe. Shoes without a removable liner may not work as well.     When purchasing new shoes, bring your orthotics along to get a proper fit. Shop at stores that are familiar with orthotics.   Frequent washing of the orthotic may shorten the life span of the top cover. The top cover can be replaced but will generally last one to five years depending on use and foot perspiration.           BODY WEIGHT AND YOUR FEET  The following information is included in the after visit summary for all patients. Body weight can be a sensitive issue to discuss in clinic, but we think the following information is very important. Although we focus on the feet and ankles, we do support the overall health of our patients.     Many things can cause foot and ankle problems. Foot structure, activity level, foot mechanics and injuries are common causes of pain. One very important issue that often goes unmentioned, is body weight. Extra weight can cause increased stress on muscles, ligaments, bones and tendons. Sometimes just a few extra pounds is all it takes to put one over her/his threshold. Without reducing that stress, it can be difficult to alleviate pain. As Foot & Ankle specialists, our job is addressing the lower extremity problem and possible causes. Regarding extra body weight, we encourage patients to discuss diet and weight management plans with their primary care doctors. It is this team approach that gives you the best opportunity for pain relief and getting you back on your  feet.      [facility] has a Comprehensive Weight Management Program. This program includes counseling, education, non-surgical and surgical approaches to weight loss. If you are interested in learning more either talk to you primary care provider or call [phone].

## 2019-05-22 NOTE — PROGRESS NOTES
"Podiatry / Foot and Ankle Surgery Progress Note    May 22, 2019    Subject: Patient was seen for follow up on 3 weeks s/p right 3rd toe amputation. Denies fever, chills, nausa.     Objective:  Vitals: Ht 1.93 m (6' 4\")   Wt 112.5 kg (248 lb)   BMI 30.19 kg/m      A1C: 9.0 ( 5/2019)    General:  Patient is alert and orientated.  NAD  Dressing is c/d/i. Sutures intact. No redness, dehiscence or signs of infection.     Assessment:    Post-operative state  Diabetic polyneuropathy associated with type 2 diabetes mellitus (H)    Plan:  Sutures removed. Patient can get foot wet.     Татьяна Mckeon DPM, Podiatry/Foot and Ankle Surgery    Weight management plan: Patient was referred to their PCP to discuss a diet and exercise plan.    Recommended to Jayro Elder to follow up with Primary Care provider regarding elevated blood pressure.    "

## 2019-05-22 NOTE — LETTER
"    5/22/2019         RE: Jayro Elder  [address] [place] [address]        Dear Colleague,    Thank you for referring your patient, Jayro Elder, to the [facility]. Please see a copy of my visit note below.    Podiatry / Foot and Ankle Surgery Progress Note    May 22, 2019    Subject: Patient was seen for follow up on 3 weeks s/p right 3rd toe amputation. Denies fever, chills, nausa.     Objective:  Vitals: Ht 1.93 m (6' 4\")   Wt 112.5 kg (248 lb)   BMI 30.19 kg/m       A1C: 9.0 ( 5/2019)    General:  Patient is alert and orientated.  NAD  Dressing is c/d/i. Sutures intact. No redness, dehiscence or signs of infection.     Assessment:    Post-operative state  Diabetic polyneuropathy associated with type 2 diabetes mellitus (H)    Plan:  Sutures removed. Patient can get foot wet.     Татьяна Mckeon DPM, Podiatry/Foot and Ankle Surgery    Weight management plan: Patient was referred to their PCP to discuss a diet and exercise plan.    Recommended to Jayro Elder to follow up with Primary Care provider regarding elevated blood pressure.      Again, thank you for allowing me to participate in the care of your patient.        Sincerely,        Татьяна Mckeon DPM, Podiatry/Foot and Ankle Surgery    "

## 2019-06-21 ENCOUNTER — ANCILLARY PROCEDURE (OUTPATIENT)
Dept: GENERAL RADIOLOGY | Facility: CLINIC | Age: 69
End: 2019-06-21
Attending: PHYSICIAN ASSISTANT
Payer: COMMERCIAL

## 2019-06-21 ENCOUNTER — OFFICE VISIT (OUTPATIENT)
Dept: URGENT CARE | Facility: URGENT CARE | Age: 69
End: 2019-06-21
Payer: COMMERCIAL

## 2019-06-21 VITALS
BODY MASS INDEX: 30.19 KG/M2 | DIASTOLIC BLOOD PRESSURE: 74 MMHG | HEART RATE: 83 BPM | TEMPERATURE: 98.2 F | SYSTOLIC BLOOD PRESSURE: 130 MMHG | RESPIRATION RATE: 18 BRPM | OXYGEN SATURATION: 96 % | WEIGHT: 248 LBS

## 2019-06-21 DIAGNOSIS — I50.22 CHRONIC SYSTOLIC CONGESTIVE HEART FAILURE (H): ICD-10-CM

## 2019-06-21 DIAGNOSIS — J18.9 PNEUMONIA OF RIGHT LOWER LOBE DUE TO INFECTIOUS ORGANISM: Primary | ICD-10-CM

## 2019-06-21 DIAGNOSIS — R06.02 SOB (SHORTNESS OF BREATH): ICD-10-CM

## 2019-06-21 DIAGNOSIS — E11.51 TYPE 2 DIABETES MELLITUS WITH DIABETIC PERIPHERAL ANGIOPATHY WITHOUT GANGRENE, UNSPECIFIED WHETHER LONG TERM INSULIN USE (H): ICD-10-CM

## 2019-06-21 PROCEDURE — 99214 OFFICE O/P EST MOD 30 MIN: CPT | Performed by: PHYSICIAN ASSISTANT

## 2019-06-21 PROCEDURE — 71046 X-RAY EXAM CHEST 2 VIEWS: CPT

## 2019-06-21 RX ORDER — AZITHROMYCIN 250 MG/1
TABLET, FILM COATED ORAL
Qty: 6 TABLET | Refills: 0 | Status: SHIPPED | OUTPATIENT
Start: 2019-06-21 | End: 2019-06-26

## 2019-06-21 ASSESSMENT — ENCOUNTER SYMPTOMS
DYSURIA: 0
HEMATURIA: 0
EYES NEGATIVE: 1
PALPITATIONS: 0
ADENOPATHY: 0
COUGH: 1
MUSCULOSKELETAL NEGATIVE: 1
FEVER: 0
DIZZINESS: 0
RHINORRHEA: 0
NEUROLOGICAL NEGATIVE: 1
GASTROINTESTINAL NEGATIVE: 1
CONSTITUTIONAL NEGATIVE: 1
CARDIOVASCULAR NEGATIVE: 1
WEAKNESS: 0
EYE ITCHING: 0
LIGHT-HEADEDNESS: 0
EYE REDNESS: 0
CHEST TIGHTNESS: 0
VOMITING: 0
POLYDIPSIA: 0
NAUSEA: 0
SORE THROAT: 0
ENDOCRINE NEGATIVE: 1
SHORTNESS OF BREATH: 1
WHEEZING: 0
EYE DISCHARGE: 0
ABDOMINAL PAIN: 0
DIAPHORESIS: 0
FREQUENCY: 0
CHILLS: 0
MYALGIAS: 0
DIARRHEA: 0
HEADACHES: 0

## 2019-06-21 NOTE — PROGRESS NOTES
"Chief Complaint:     Chief Complaint   Patient presents with     Urgent Care     URI     coughing and cold sx, chest congestion all started 3 days ago, runny nose        HPI: Jayro MAYURI Elder is an 68 year old male who presents with productive cough, sob, nasal congestion and clear rhinorrhea. It began  3 day(s) ago and has unchanged.  Cough is dry, nonproductive There is no wheezing and chest pain.      Patient has a complicated medical Hx \"see problem list below.\"    Recent travel?  no.      ROS:     Review of Systems   Constitutional: Negative.  Negative for chills, diaphoresis and fever.   HENT: Positive for congestion. Negative for ear pain, rhinorrhea and sore throat.    Eyes: Negative.  Negative for discharge, redness and itching.   Respiratory: Positive for cough and shortness of breath. Negative for chest tightness and wheezing.    Cardiovascular: Negative.  Negative for chest pain and palpitations.   Gastrointestinal: Negative.  Negative for abdominal pain, diarrhea, nausea and vomiting.   Endocrine: Negative.  Negative for polydipsia and polyuria.   Genitourinary: Negative for dysuria, frequency, hematuria and urgency.   Musculoskeletal: Negative.  Negative for myalgias.   Skin: Negative for rash.   Allergic/Immunologic: Negative for immunocompromised state.   Neurological: Negative.  Negative for dizziness, weakness, light-headedness and headaches.   Hematological: Negative for adenopathy.        Respiratory History  occasional episodes of bronchitis and pneumonia       Family History   Family History   Problem Relation Age of Onset     Cancer - colorectal Sister      Thyroid Disease Brother      Cardiovascular Brother      Diabetes Brother      Cancer Father         tumor in chest and throat     Arthritis Mother      Thyroid Disease Mother      Diabetes Mother      Glaucoma No family hx of      Macular Degeneration No family hx of         Problem history  Patient Active Problem List   Diagnosis     " Generalized osteoarthrosis, unspecified site     Tobacco use disorder     Hypertension goal BP (blood pressure) < 140/90     Benign neoplasm of lower jaw bone     Abdominal pain, right upper quadrant     Diabetes mellitus, type 2 (H)     Cardiovascular disease     CARDIOVASCULAR SCREENING; LDL GOAL LESS THAN 100     Health Care Home     Cardiomyopathy (H)     CVA (cerebral vascular accident) (H)     Coronary artery disease involving native coronary artery of native heart with angina pectoris (H)     Status post coronary angiogram     Chronic atrial fibrillation (H)     Homonymous hemianopsia, right     Cellulitis of left lower extremity     Syncope, unspecified syncope type     Diabetic ulcer of left midfoot associated with type 2 diabetes mellitus, with fat layer exposed (H)     Type 2 diabetes mellitus with diabetic peripheral angiopathy without gangrene (H)     Microalbuminuria     Pressure ulcer of toe of right foot, stage 3 (H)     Cellulitis     Bacteremia     CHF (congestive heart failure) (H)     History of CVA (cerebrovascular accident)     Hyperlipidemia     Hypothyroidism     Non-toxic nodular goiter     Acute osteomyelitis (H)     Post-operative state        Allergies  Allergies   Allergen Reactions     No Known Allergies         Social History  Social History     Socioeconomic History     Marital status:      Spouse name: Not on file     Number of children: Not on file     Years of education: Not on file     Highest education level: Not on file   Occupational History     Not on file   Social Needs     Financial resource strain: Not on file     Food insecurity:     Worry: Not on file     Inability: Not on file     Transportation needs:     Medical: Not on file     Non-medical: Not on file   Tobacco Use     Smoking status: Former Smoker     Packs/day: 1.00     Years: 25.00     Pack years: 25.00     Types: Cigarettes     Last attempt to quit: 2005     Years since quittin.9     Smokeless  tobacco: Never Used   Substance and Sexual Activity     Alcohol use: No     Alcohol/week: 2.4 oz     Types: 4 Shots of liquor per week     Frequency: Never     Comment: almost every day     Drug use: No     Sexual activity: Yes     Partners: Female   Lifestyle     Physical activity:     Days per week: Not on file     Minutes per session: Not on file     Stress: Not on file   Relationships     Social connections:     Talks on phone: Not on file     Gets together: Not on file     Attends Yazidism service: Not on file     Active member of club or organization: Not on file     Attends meetings of clubs or organizations: Not on file     Relationship status: Not on file     Intimate partner violence:     Fear of current or ex partner: Not on file     Emotionally abused: Not on file     Physically abused: Not on file     Forced sexual activity: Not on file   Other Topics Concern     Parent/sibling w/ CABG, MI or angioplasty before 65F 55M? Yes      Service Not Asked     Blood Transfusions Not Asked     Caffeine Concern No     Comment: 3 cups daily     Occupational Exposure Not Asked     Hobby Hazards Not Asked     Sleep Concern Not Asked     Stress Concern Not Asked     Weight Concern Not Asked     Special Diet Yes     Comment: watching carb intake     Back Care Not Asked     Exercise No     Comment: some walking     Bike Helmet Not Asked     Seat Belt Not Asked     Self-Exams Not Asked   Social History Narrative     Not on file        Current Meds    Current Outpatient Medications:      azithromycin (ZITHROMAX) 250 MG tablet, Two tablets first day, then one tablet daily for four days., Disp: 6 tablet, Rfl: 0     apixaban ANTICOAGULANT (ELIQUIS) 5 MG tablet, Take 1 tablet (5 mg) by mouth 2 times daily, Disp: , Rfl:      atorvastatin (LIPITOR) 40 MG tablet, Take 1 tablet (40 mg) by mouth every evening, Disp: 90 tablet, Rfl: 3     carvedilol (COREG) 25 MG tablet, Take 1 tablet (25 mg) by mouth 2 times daily (with  meals), Disp: 180 tablet, Rfl: 3     Cholecalciferol (VITAMIN D3) 2000 units TABS, Take 1 tablet by mouth daily, Disp: , Rfl:      furosemide (LASIX) 80 MG tablet, Take 1 tablet (80 mg) by mouth daily, Disp: 90 tablet, Rfl: 1     insulin aspart (NOVOLOG FLEXPEN) 100 UNIT/ML pen, Inject Subcutaneous 3 times daily (with meals) Sliding Scale Do not give sliding scale insulin if Pre-Meal BG less than 140.  For Pre-Meal:  - 200 give 2 units.   - 250 give 4 units.   - 300 give 6 units.   - 350 give 8 units.   - 400 give 10 units., Disp: , Rfl:      insulin aspart (NOVOLOG PEN) 100 UNIT/ML pen, Inject 8 Units Subcutaneous 3 times daily (with meals), Disp: , Rfl:      insulin glargine (LANTUS SOLOSTAR PEN) 100 UNIT/ML pen, Inject 36 Units Subcutaneous every morning, Disp: , Rfl:      insulin pen needle 31G X 6 MM, Use  as directed., Disp: 100 each, Rfl: prn     isosorbide mononitrate (IMDUR) 30 MG 24 hr tablet, Take 1 tablet (30 mg) by mouth daily, Disp: 90 tablet, Rfl: 1     levothyroxine (SYNTHROID, LEVOTHROID) 137 MCG tablet, Take 137 mcg by mouth At Bedtime , Disp: 90 tablet, Rfl: 3     lisinopril (PRINIVIL/ZESTRIL) 5 MG tablet, Take 1 tablet (5 mg) by mouth daily, Disp: 90 tablet, Rfl: 3     nitroglycerin (NITROSTAT) 0.4 MG sublingual tablet, Place 1 tablet (0.4 mg) under the tongue every 5 minutes as needed for chest pain, Disp: 50 tablet, Rfl: 0     order for DME, Equipment being ordered: Other: surgical shoe male XL Treatment Diagnosis: sp right 2nd toe amputaion, Disp: 1 Device, Rfl: 0     order for DME, Equipment being ordered: Walker Wheels () and Walker () Treatment Diagnosis: Impaired gait, heel WB bilaterally., Disp: 1 each, Rfl: 0     pantoprazole (PROTONIX) 40 MG enteric coated tablet, Take 1 tablet (40 mg) by mouth every morning (before breakfast), Disp: 30 tablet, Rfl: 0     potassium chloride ER (K-DUR/KLOR-CON M) 10 MEQ CR tablet, Take 1 tablet (10 mEq) by mouth 2  times daily, Disp: 180 tablet, Rfl: 0        OBJECTIVE     Vital signs reviewed by Froilan Hernandez  /74   Pulse 83   Temp 98.2  F (36.8  C) (Oral)   Resp 18   Wt 112.5 kg (248 lb)   SpO2 96%   BMI 30.19 kg/m       Physical Exam   Constitutional: He is oriented to person, place, and time. He appears well-developed and well-nourished. He is cooperative.  Non-toxic appearance. He does not have a sickly appearance. He does not appear ill. No distress.   HENT:   Head: Normocephalic and atraumatic.   Right Ear: Hearing, tympanic membrane, external ear and ear canal normal. Tympanic membrane is not perforated, not erythematous, not retracted and not bulging.   Left Ear: Hearing, tympanic membrane, external ear and ear canal normal. Tympanic membrane is not perforated, not erythematous, not retracted and not bulging.   Nose: Mucosal edema and rhinorrhea present. Right sinus exhibits no maxillary sinus tenderness and no frontal sinus tenderness. Left sinus exhibits no maxillary sinus tenderness and no frontal sinus tenderness.   Mouth/Throat: Mucous membranes are normal. Posterior oropharyngeal erythema present. No oropharyngeal exudate or posterior oropharyngeal edema. Tonsils are 0 on the right. Tonsils are 0 on the left. No tonsillar exudate.   Eyes: Pupils are equal, round, and reactive to light. Conjunctivae, EOM and lids are normal. Right eye exhibits no discharge and no exudate. Left eye exhibits no discharge and no exudate. Right conjunctiva is not injected. Left conjunctiva is not injected.   Neck: Normal range of motion. Neck supple.   Cardiovascular: Normal rate, regular rhythm, normal heart sounds and intact distal pulses. Exam reveals no gallop and no friction rub.   No murmur heard.  Pulmonary/Chest: Effort normal and breath sounds normal. No stridor. No respiratory distress. He has no decreased breath sounds. He has no wheezes. He has no rhonchi. He has no rales. He exhibits no tenderness.    Abdominal: Soft. Bowel sounds are normal. He exhibits no distension and no mass. There is no tenderness. There is no rigidity, no rebound, no guarding and no CVA tenderness.   Musculoskeletal: Normal range of motion.   Neurological: He is alert and oriented to person, place, and time. He displays normal reflexes. No cranial nerve deficit or sensory deficit. He exhibits normal muscle tone. Coordination normal.   Skin: Skin is warm. Capillary refill takes less than 2 seconds. He is not diaphoretic.   Psychiatric: He has a normal mood and affect. His behavior is normal. Judgment and thought content normal.         Labs:       Medical Decision Making:    Differential Diagnosis:  URI Adult/Peds:  Bronchitis-viral, Pneumonia, Viral syndrome and Viral upper respiratory illness        ASSESSMENT    1. Pneumonia of right lower lobe due to infectious organism (H)    2. Chronic systolic congestive heart failure (H)    3. Type 2 diabetes mellitus with diabetic peripheral angiopathy without gangrene, unspecified whether long term insulin use (H)    4. SOB (shortness of breath)        PLAN    Patient presents with 3 days of productive cough, sob, nasal congestion and clear rhinorrhea.  Patient is in no acute distress.  Temp is 98.2 in clinic today.  Lung sounds were clear and O2 sats at 96% on RA.  Imaging to rule out pneumonia ordered.  CXR shows R lower lobe infiltrate per my read.  Rx for Zithromax today.  Patient encouraged to monitor his blood sugars closely with illness.  Last A1C was 9.0 on 5/5/2019  Rest, Push fluids, vaporizer, elevation of head of bed.  Ibuprofen and or Tylenol for any fever or body aches.  Over the counter cough suppressant- PRN- as discussed.   If symptoms worsen, recheck immediately otherwise follow up with your PCP in 3 day for reevaluation.  Worrisome symptoms discussed with instructions to go to the ED.  Patient verbalized understanding and agreed with this plan.         Froilan Hernandez   6/21/2019, 6:35 PM

## 2019-06-26 ENCOUNTER — OFFICE VISIT (OUTPATIENT)
Dept: FAMILY MEDICINE | Facility: CLINIC | Age: 69
End: 2019-06-26
Payer: COMMERCIAL

## 2019-06-26 ENCOUNTER — OFFICE VISIT (OUTPATIENT)
Dept: PODIATRY | Facility: CLINIC | Age: 69
End: 2019-06-26
Payer: COMMERCIAL

## 2019-06-26 VITALS
WEIGHT: 248 LBS | HEIGHT: 76 IN | DIASTOLIC BLOOD PRESSURE: 90 MMHG | SYSTOLIC BLOOD PRESSURE: 138 MMHG | BODY MASS INDEX: 30.2 KG/M2

## 2019-06-26 VITALS
TEMPERATURE: 97.9 F | RESPIRATION RATE: 16 BRPM | BODY MASS INDEX: 30.08 KG/M2 | DIASTOLIC BLOOD PRESSURE: 78 MMHG | HEART RATE: 70 BPM | SYSTOLIC BLOOD PRESSURE: 130 MMHG | OXYGEN SATURATION: 95 % | HEIGHT: 76 IN | WEIGHT: 247 LBS

## 2019-06-26 DIAGNOSIS — E11.42 DIABETIC POLYNEUROPATHY ASSOCIATED WITH TYPE 2 DIABETES MELLITUS (H): Primary | ICD-10-CM

## 2019-06-26 DIAGNOSIS — J18.9 PNEUMONIA OF RIGHT LOWER LOBE DUE TO INFECTIOUS ORGANISM: ICD-10-CM

## 2019-06-26 DIAGNOSIS — R05.9 COUGH: Primary | ICD-10-CM

## 2019-06-26 DIAGNOSIS — Z86.73 HISTORY OF CVA (CEREBROVASCULAR ACCIDENT): ICD-10-CM

## 2019-06-26 DIAGNOSIS — E11.8 TYPE 2 DIABETES MELLITUS WITH COMPLICATION, UNSPECIFIED WHETHER LONG TERM INSULIN USE: ICD-10-CM

## 2019-06-26 DIAGNOSIS — E08.621 DIABETIC ULCER OF LEFT MIDFOOT ASSOCIATED WITH DIABETES MELLITUS DUE TO UNDERLYING CONDITION, WITH FAT LAYER EXPOSED (H): ICD-10-CM

## 2019-06-26 DIAGNOSIS — I50.22 CHRONIC SYSTOLIC CONGESTIVE HEART FAILURE (H): ICD-10-CM

## 2019-06-26 DIAGNOSIS — I25.5 ISCHEMIC CARDIOMYOPATHY: ICD-10-CM

## 2019-06-26 DIAGNOSIS — Z89.421 H/O AMPUTATION OF LESSER TOE, RIGHT (H): ICD-10-CM

## 2019-06-26 DIAGNOSIS — L97.422 DIABETIC ULCER OF LEFT MIDFOOT ASSOCIATED WITH DIABETES MELLITUS DUE TO UNDERLYING CONDITION, WITH FAT LAYER EXPOSED (H): ICD-10-CM

## 2019-06-26 PROCEDURE — G0009 ADMIN PNEUMOCOCCAL VACCINE: HCPCS | Performed by: NURSE PRACTITIONER

## 2019-06-26 PROCEDURE — 11042 DBRDMT SUBQ TIS 1ST 20SQCM/<: CPT | Mod: 79 | Performed by: PODIATRIST

## 2019-06-26 PROCEDURE — 99214 OFFICE O/P EST MOD 30 MIN: CPT | Mod: 25 | Performed by: NURSE PRACTITIONER

## 2019-06-26 PROCEDURE — 99212 OFFICE O/P EST SF 10 MIN: CPT | Mod: 24 | Performed by: PODIATRIST

## 2019-06-26 PROCEDURE — 90732 PPSV23 VACC 2 YRS+ SUBQ/IM: CPT | Performed by: NURSE PRACTITIONER

## 2019-06-26 ASSESSMENT — MIFFLIN-ST. JEOR
SCORE: 1996.42
SCORE: 1991.88

## 2019-06-26 NOTE — LETTER
"    6/26/2019         RE: Jayro Elder  [address] [place] [address]        Dear Colleague,    Thank you for [facility] your patient, Jayro Elder, [facility] Please see a copy of my visit note below.    Podiatry / Foot and Ankle Surgery Progress Note    June 26, 2019    Subject: Patient was seen for follow up on diabetic foot check. Notes he is getting over pneumonia. No pain to feet due to neuropathy but states he has noticed a new area on left foot.     Objective:  Vitals: /90   Ht 1.93 m (6' 4\")   Wt 112.5 kg (248 lb)   BMI 30.19 kg/m       A1C: 9.0 (5/2019)    General:  Patient is alert and orientated.  NAD    Vascular:  DP and PT pulses are palpable.  No varicosities noted  CFT's < 3secs.  Skin temp is normal    Neuro:  Light and gross touch sensation absent to feet.     Derm:  New full thickenss ulceratoni to the plantar left 1st metatarsal head. Measures 2.5cm x 3.5cm x 0.2cm after debridement. No redness, purulent drainage or signs of infection noted.     Musculoskeletal: multiple previous toe amputations right foot.      Assessment:    Diabetic polyneuropathy associated with type 2 diabetes mellitus (H)  Diabetic ulcer of left midfoot associated with diabetes mellitus due to underlying condition, with fat layer exposed (H)  H/O amputation of lesser toe, right (H)      Plan:  Wound debrided. Recommend iodosorb gel at this time. He has not gotten his diabetic shoes and inserts yet as he can not drive. Hopefully will get them today. Follow up in 4 weeks. Was told to call if he develops fever, chills, or go to ER.     Procedure: After verbal consent, excisional debridement was performed on ulcer.  #15 blade was used to debride ulcer down to and including subcutaneous tissue. Bleeding controlled with light pressure.   No drainage noted.  No anesthesia was used due to neuropathy. Dry dressing applied to foot.  Patient tolerated procedure " well.      Татьяна Mckeon DPM, Podiatry/Foot and Ankle Surgery    Weight management plan: Patient was referred to their PCP to discuss a diet and exercise plan.    Recommended to Jayro Elder to follow up with Primary Care provider regarding elevated blood pressure.      Again, thank you for allowing me to participate in the care of your patient.        Sincerely,        Татьяна Mckeon DPM, Podiatry/Foot and Ankle Surgery

## 2019-06-26 NOTE — PATIENT INSTRUCTIONS
Try the saline nasal spray to clear out the post nasal drip  Drink plenty of extra fluids, honey soothes the cough (honey and lemon in tea is great), and cold fluids help keep swelling down.     Chicken noodle soup, and good nutrition in small amounts. Vitamin C and Zinc can help boost your immune system and can be taken short term while you fight this off.      I would also recommend to thin mucous:  Guaifenesin regularly until you are better  Avoid NSAIDs and decongestants as we discussed.     Establish with a Primary Care Provider to monitor your Diabetes and other health conditions regularly    Return to clinic at any time if you develop high fevers, if worsening or in 1-2 weeks if not improving.

## 2019-06-26 NOTE — PATIENT INSTRUCTIONS
Thank you for choosing [facility]!    DR. ASENCIO'S CLINIC SCHEDULE  MONDAY AM - [place] TUESDAY - [place]   [address] [address] [place] [address] [place] [address]   [phone] / [phone] / [phone]       WEDNESDAY - [place] FRIDAY AM - WOUND CENTER   [address]   [place] [address] [place] [address]   [phone] / [phone]       FRIDAY PM - [place] SCHEDULE SURGERY: [phone]   [address] BILLING QUESTIONS: [phone]   [place] [address] AFTER HOURS: [phone]   [phone] / [phone] APPOINTMENTS: [phone]     [employer] (CPL) [phone]     FOOT ULCER (WOUND) EDUCATION  Ulceration ofthe foot involves a break or hole in the skin. Skin is our best protection against infection. Skin is quite durable, however, the underlying tissues are fragile. For this reason, the wound is likely to deepen rapidly. Deep wounds usually get infected and require amputation. Prompt healing is therefore essential to avoid limb loss.     Foot ulcers do not heal without intervention. Walking on the foot and living your normal life is not typically compatible with healing the sore. Successful healing will require several months of significant alteration of your daily activities.   Ulcer complications frequently develop. This primarily includes infection of skin, which then spreads deep into your joints, bones and tendons. Spreading infection may travel up your leg and into other parts of your body. Deep infection is usually treated with amputation ofpart ofyour foot or your leg. Signs of infection include fever, chills, nausea, vomiting, erratic blood sugars, local redness, pus, strong odor and localized warmth. Signs of infection should be taken seriously. Prompt evaluation in the clinic or hospital emergency room is required.   Ulcer treatment requires  debridement or surgical removal of devitalized tissue. Your doctor will trim away callused, moistened, unhealthy tissue from the wound surface and margin. This helps to clean the wound and allows proper inspection. Debridement also stimulates healing even though the wound originally appears larger. Expect some bleeding with each debridement. You will be given instruction regarding wound bandaging. This often includes ointment and gauze. Avoid tape directly on the skin. Hand washing is essential since most infections will come from your fingertips. Ulcer care requires a no touch technique. Your fingers should not touch the margin or base of the wound.    HELPFUL HEALING TIPS:  1. Debridement: Getting rid of bad tissue makes way for good tissue to promote healing  2. Addressing Foot Deformities: Hammertoes and bunions can cause increased pressure  3. Pressure Reduction: If pressure remains to the wound, it won't heal  4. Good Pulses: If bloodflow is not getting to the foot, the ulcer will not heal  5. Good Nutrition: If you are not getting proper nutrition your body can't heal.Protein!  6. Infection Control: Keep the ulcer clean with wound cleanser. DO NOT SOAK IT!  7. Moisture Control: Keep edema down and make sure that drainage is getting pulled away from the ulcer    IMPORTANCE OF DEBRIDEMENT    Reduces bioburden to help control or reduce infection. Even if an ulcer is not  infected,  the bacterial bioburden causes increased local inflammation.    Allows more accurate visualization of the wound base and edges, which allows for more precise staging.    Removes necrotic/non-viable tissue, which impedes wound healing, causes protein loss and can be a nidus for infection.    Stimulates new circulation (angiogenesis) and allows adequate oxygen delivery to the wound.    Removes undermining and tunneling, and may help reduce abscess formation.    Releases healing growth factors at the edge of the wound.    Prepares the  "wound bed by leaving only tissues that are capable of regenerating.    DIABETES AND YOUR FEET  Diabetes can result in several problems in the feet including ulcers (open sores) and amputations. Two of the most important reasons why people develop foot problems when they have diabetes is : 1. Neuropathy (loss of feeling)  2. Vascular disease (loss or decrease of blood flow).    Neuropathy is a term used to describe a loss of nerve function.  Patients with diabetes are at risk of developing neuropathy if their sugars continue to run high and are above the normal value. One theory for neuropathy is that the \"extra\" sugar in the body enters the nerves and is broken down. These by-products build up in the nerve causing it to swell and impairing nerve function. Often times, this can be prevented by controlling your sugars, dieting and exercise.    When a person develops neuropathy, they usually begin to feel numbness or tingling in their feet and sometime in their legs.  Other symptoms may include painful burning or hot feet, tingling or feeling like insects or ants are crawling on your feet or legs.  If the diabetes is sever and the sugars run high for long periods of time, neuropathy can also occur in the hands.    Vascular disease  is a term used to describe a loss or decrease in circulation (blood flow). There is a problem in getting blood and oxygen to areas that need it. Similar to neuropathy, sugars can build up in the walls of the arteries (blood vessels) and cause them to become swollen, thickened and hardened. This decreases the amount of blood that can go to an area that needs it. Though this is common in the legs of diabetic patients, it can also affect other arteries (blood vessels) in the body such as in the heart and eyes.    In the legs, vascular disease usually results in cramping. Patients who develop leg cramps after walking the same distance every time (i.e. One block, half a mile, ect.) need to let " their doctors know so that their circulation may be checked. Cramps causing severe pain in the feet and/or legs while sleeping and the cramps go away when you stand or hang your legs off the side of the bed, may also be a sign of poor blood circulation.  Occasional cramping in cold weather or on rare occasions with activity may not be due to poor circulation, but you should inform your doctor.    PREVENTION OF THESE DISEASES  The key to prevention is good blood sugar control. Poor blood sugar control is a big reason many of these problems start. Physical activity (exercise) is a very good way to help decrease your blood sugars. Exercise can lower your blood sugar, blood pressure, and cholesterol. It also reduces your risk for heart disease and stroke, relieves stress, and strengthens your heart, muscles and bones.  In addition, regular activity helps insulin work better, improves your blood circulation, and keeps your joints flexible. If you're trying to lose weight, a combination of exercise and wise food choices can help you reach your target weight and maintain it.      PAIN MANAGEMENT  1.Blood Sugar Control - Most important  2. Medications such as:  Amytriptylline, duloxetine, gabapentin, lyrica, tramadol  3. Nutritional therapy:  Vitamin B6 (100mg daily), Vitamin B12 (75mcg daily), Vitamin D 2000 IU daily), Alpha-Lipoic Acid (600-1800mg daily), Acetyl-L-Carnitine (500-1000mg TID, L-methyl folate (1500mcg daily)    ** Metformin can block Vitamin B6 and B12 so it is important to supplement**    FOOT CARE RECOMMENDATIONS   1. Wash your feet with lukewarm water and a mild soap and then dry them thoroughly, especially between the toes.     2. Examine your feet daily looking for cuts, corns, blisters, cracks, ect, especially after wearing new shoes. Make sure to look between your toes. If you cannot see the bottom of your feet, set a mirror on the floor and hold your foot over it, or ask a spouse, friend or family  member to examine your feet for you. Contact your doctor immediately if new problems are noted or if sores are not healing.     3. Immediately apply moisturizer to the tops and bottoms of your feet, avoiding areas between the toes. Hand lotion (Intesive Care, Lorin, Eucerin, Neutrogena, Curel, ect) is sufficient unless your doctor prescribes a medicated lotion. Apply sunscreen to your feet when going swimming outside.     4. Use clean comfortable shoes, wear white socks (if you have any bleeding or drainage, you will see it on white socks). Socks should not have thick seams or cut off the circulation around the leg. Break in new shoes slowly and rotate with older shoes until broken in. Check the inside of your shoes with your hand to look for areas of irritation or objects that may have fallen into your shoes.       5. Keep slippers by the side of your bed for use during the night.     6.  Shoes should be fitted by a professional and should not cause areas of irritation.  Check your feet regularly when wearing a new pair of shoes and replace them as needed.     7.  Talk to your doctor about proper exercise. Exercise and stretching stimulate blood flow to your feet and maintain proper glucose levels.     8.  Monitor your blood glucose level as instructed by your doctor. Notify your doctor immediately if your blood sugar is abnormally high or low.    9. Cut your nails straight across, but then gently round any sharp edges with a cardboard nail file. If you have neuropathy, peripheral vascular disease or cannot see that well to trim your own toenails contact [facility] ([phone]) or [facility] ([phone]).      THINGS TO AVOID DOING   1.  Do not soak your feet if you have an open sore. Use only lukewarm water and always check the temperature with your hand as hot water can easily burn your feet.       2.  Never use a hot water bottle or heating pad on your feet. Also do not apply cold compresses to your feet.  With decreased sensation, you could burn or freeze your feet.       3.  Do not apply any of these to your feet:    -  Over the counter medicine for corns or warts    -  Harsh chemicals like boric acid    -  Do not self-treat corns, cuts, blisters or infections. Always consult your doctor.       4.  Do not wear sandals, slippers or walk barefoot, especially on hot sand or concrete or other harsh surfaces.     5.  If you smoke, stop!!!          BODY WEIGHT AND YOUR FEET  The following information is included in the after visit summary for all patients. Body weight can be a sensitive issue to discuss in clinic, but we think the following information is very important. Although we focus on the feet and ankles, we do support the overall health of our patients.     Many things can cause foot and ankle problems. Foot structure, activity level, foot mechanics and injuries are common causes of pain. One very important issue that often goes unmentioned, is body weight. Extra weight can cause increased stress on muscles, ligaments, bones and tendons. Sometimes just a few extra pounds is all it takes to put one over her/his threshold. Without reducing that stress, it can be difficult to alleviate pain. As Foot & Ankle specialists, our job is addressing the lower extremity problem and possible causes. Regarding extra body weight, we encourage patients to discuss diet and weight management plans with their primary care doctors. It is this team approach that gives you the best opportunity for pain relief and getting you back on your feet.      [facility] has a Comprehensive Weight Management Program. This program includes counseling, education, non-surgical and surgical approaches to weight loss. If you are interested in learning more either talk to you primary care provider or call [phone].

## 2019-06-26 NOTE — PROGRESS NOTES
"Podiatry / Foot and Ankle Surgery Progress Note    June 26, 2019    Subject: Patient was seen for follow up on diabetic foot check. Notes he is getting over pneumonia. No pain to feet due to neuropathy but states he has noticed a new area on left foot.     Objective:  Vitals: /90   Ht 1.93 m (6' 4\")   Wt 112.5 kg (248 lb)   BMI 30.19 kg/m      A1C: 9.0 (5/2019)    General:  Patient is alert and orientated.  NAD    Vascular:  DP and PT pulses are palpable.  No varicosities noted  CFT's < 3secs.  Skin temp is normal    Neuro:  Light and gross touch sensation absent to feet.     Derm:  New full thickenss ulceratoni to the plantar left 1st metatarsal head. Measures 2.5cm x 3.5cm x 0.2cm after debridement. No redness, purulent drainage or signs of infection noted.     Musculoskeletal: multiple previous toe amputations right foot.      Assessment:    Diabetic polyneuropathy associated with type 2 diabetes mellitus (H)  Diabetic ulcer of left midfoot associated with diabetes mellitus due to underlying condition, with fat layer exposed (H)  H/O amputation of lesser toe, right (H)      Plan:  Wound debrided. Recommend iodosorb gel at this time. He has not gotten his diabetic shoes and inserts yet as he can not drive. Hopefully will get them today. Follow up in 4 weeks. Was told to call if he develops fever, chills, or go to ER.     Procedure: After verbal consent, excisional debridement was performed on ulcer.  #15 blade was used to debride ulcer down to and including subcutaneous tissue. Bleeding controlled with light pressure.   No drainage noted.  No anesthesia was used due to neuropathy. Dry dressing applied to foot.  Patient tolerated procedure well.      Татьяна Mckeon DPM, Podiatry/Foot and Ankle Surgery    Weight management plan: Patient was referred to their PCP to discuss a diet and exercise plan.    Recommended to Jayro Elder to follow up with Primary Care provider regarding elevated blood " pressure.

## 2019-06-26 NOTE — PROGRESS NOTES
"Subjective     Jayro MAYURI Elder is a 68 year old male who presents to clinic today for the following health issues:    HPI   RESPIRATORY SYMPTOMS      Duration: 1.5 weeks     Description  cough    Severity: moderate    Accompanying signs and symptoms: productive cough with green mucus, SOB, and weakness     History (predisposing factors):  none    Precipitating or alleviating factors: pt was seen on Friday and given  antibiotics     Therapies tried and outcome:  Rest and antibiotics - finished as ordered    RLL pneumonia diagnosis 6/21, no n/v no fevers or chills or tightness any more, \"just still coughing up crud\"  Diarrhea was mild and resolved    PROBLEMS TO ADD ON...  DM- reports his sugars have been at goal with this illness, 155 this am and none above 200 or any lows , \"have been good since the diabetic patch he got online that manages his sugars with essential oils and things\"  Saw podiatry today and has had 3 toes amputated, noted last A1c was not at goal in May was 9.0    BP at goal, checks at home. No symptoms, kidney function normal last BMP in May  Chronic systolic HF not monitoring his weights, no edema or chest pain, on lasix and K, reports taking all meds as ordered  AFib on Eliquis    Complex history noted CVA no HA or symptoms, admits No Primary Care Provider   Ex smoker many years back       Patient Active Problem List   Diagnosis     Generalized osteoarthrosis, unspecified site     Tobacco use disorder     Hypertension goal BP (blood pressure) < 140/90     Benign neoplasm of lower jaw bone     Abdominal pain, right upper quadrant     Diabetes mellitus, type 2 (H)     Cardiovascular disease     CARDIOVASCULAR SCREENING; LDL GOAL LESS THAN 100     Health Care Home     Cardiomyopathy (H)     CVA (cerebral vascular accident) (H)     Coronary artery disease involving native coronary artery of native heart with angina pectoris (H)     Status post coronary angiogram     Chronic atrial fibrillation (H) "     Homonymous hemianopsia, right     Cellulitis of left lower extremity     Syncope, unspecified syncope type     Diabetic ulcer of left midfoot associated with type 2 diabetes mellitus, with fat layer exposed (H)     Type 2 diabetes mellitus with diabetic peripheral angiopathy without gangrene (H)     Microalbuminuria     Pressure ulcer of toe of right foot, stage 3 (H)     Cellulitis     Bacteremia     CHF (congestive heart failure) (H)     History of CVA (cerebrovascular accident)     Hyperlipidemia     Hypothyroidism     Non-toxic nodular goiter     Acute osteomyelitis (H)     Post-operative state     Past Surgical History:   Procedure Laterality Date     AMPUTATE TOE(S) Right 1/6/2019    Procedure: Right open second toe partial amputation;  Surgeon: Sabino Garcia DPM;  Location: [facility] OR     AMPUTATE TOE(S) Right 1/8/2019    Procedure: 1.  Revision right second toe amputation with resection of distal half of proximal phalanx with full closure.    2.  Debridement of callus/preulcerative lesion, distal left second toe and plantar left first metatarsophalangeal joint.;  Surgeon: Ryan Bhagat DPM;  Location: [facility]     AMPUTATE TOE(S) Right 3/12/2019    Procedure: Partial right fourth toe amputation.;  Surgeon: Ryan Bhagat DPM;  Location: [facility]     AMPUTATE TOE(S) Right 5/6/2019    Procedure: Right partial third toe amputation;  Surgeon: Татьяна Mckeon DPM, Podiatry/Foot and Ankle Surgery;  Location: [facility] OR     ANGIOGRAM  6/29/05    subtotal occ.mid LAD,SUSAN mid LAD     ANGIOGRAM  7/05    mild CAD,patent stent,no flow-limiting lesions,sev.LV dysf.LAD enlarged     ANGIOGRAM  2/09    Sev.single vessel disease,Mod LV dysf.distal LAD 90%,70-75% mid lad just before prev stent,SUSAN to prox.mid LAD, endeavor to distal LAD     ANGIOGRAM  11/13/13    restenosis, stent LAD     BACK SURGERY      back surgery x3     C NONSPECIFIC PROCEDURE      Laminectomy x 3 - (1983 x 2 & 1990)     C NONSPECIFIC PROCEDURE  Bilateral 1998    Bunionectomy     C NONSPECIFIC PROCEDURE      Gingival surgery at age 9     HEART CATH LEFT HEART CATH  2017    SUSAN to OM3     INCISION AND DRAINAGE TOE, COMBINED Bilateral 2018    Procedure: COMBINED INCISION AND DRAINAGE TOE;  1) Irrigation and debridement ulcer left great toe  2) Amputation 3rd toe right foot at distal interphalangeal joint level;  Surgeon: Miki Harp MD;  Location: [facility] OR     IRRIGATION AND DEBRIDEMENT FOOT, COMBINED Left 3/12/2019    Procedure: Debridement of left foot ulcer sub first MPJ 2 x 2.5 cm down to and including subcutaneous tissue, callus debridement for preulcerative lesion, left second toe.;  Surgeon: Ryan Bhagat DPM;  Location: [facility]     ORTHOPEDIC SURGERY      bunion surgery both feet     ORTHOPEDIC SURGERY      left shoulder     SOFT TISSUE SURGERY      debridement of toe numerous time     VASCULAR SURGERY      7 stents in heart       Social History     Tobacco Use     Smoking status: Former Smoker     Packs/day: 1.00     Years: 25.00     Pack years: 25.00     Types: Cigarettes     Last attempt to quit: 2005     Years since quittin.9     Smokeless tobacco: Never Used   Substance Use Topics     Alcohol use: No     Alcohol/week: 2.4 oz     Types: 4 Shots of liquor per week     Frequency: Never     Comment: almost every day     Family History   Problem Relation Age of Onset     Cancer - colorectal Sister      Thyroid Disease Brother      Cardiovascular Brother      Diabetes Brother      Cancer Father         tumor in chest and throat     Arthritis Mother      Thyroid Disease Mother      Diabetes Mother      Glaucoma No family hx of      Macular Degeneration No family hx of          Current Outpatient Medications   Medication Sig Dispense Refill     apixaban ANTICOAGULANT (ELIQUIS) 5 MG tablet Take 1 tablet (5 mg) by mouth 2 times daily       atorvastatin (LIPITOR) 40 MG tablet Take 1 tablet (40 mg) by mouth every  evening 90 tablet 3     carvedilol (COREG) 25 MG tablet Take 1 tablet (25 mg) by mouth 2 times daily (with meals) 180 tablet 3     Cholecalciferol (VITAMIN D3) 2000 units TABS Take 1 tablet by mouth daily       furosemide (LASIX) 80 MG tablet Take 1 tablet (80 mg) by mouth daily 90 tablet 1     insulin aspart (NOVOLOG FLEXPEN) 100 UNIT/ML pen Inject Subcutaneous 3 times daily (with meals) Sliding Scale  Do not give sliding scale insulin if Pre-Meal BG less than 140.   For Pre-Meal:   - 200 give 2 units.    - 250 give 4 units.    - 300 give 6 units.    - 350 give 8 units.    - 400 give 10 units.       insulin aspart (NOVOLOG PEN) 100 UNIT/ML pen Inject 8 Units Subcutaneous 3 times daily (with meals)       insulin glargine (LANTUS SOLOSTAR PEN) 100 UNIT/ML pen Inject 36 Units Subcutaneous every morning       insulin pen needle 31G X 6 MM Use  as directed. 100 each prn     isosorbide mononitrate (IMDUR) 30 MG 24 hr tablet Take 1 tablet (30 mg) by mouth daily 90 tablet 1     levothyroxine (SYNTHROID, LEVOTHROID) 137 MCG tablet Take 137 mcg by mouth At Bedtime  90 tablet 3     lisinopril (PRINIVIL/ZESTRIL) 5 MG tablet Take 1 tablet (5 mg) by mouth daily 90 tablet 3     nitroglycerin (NITROSTAT) 0.4 MG sublingual tablet Place 1 tablet (0.4 mg) under the tongue every 5 minutes as needed for chest pain 50 tablet 0     order for DME Equipment being ordered: Other: surgical shoe male XL  Treatment Diagnosis: sp right 2nd toe amputaion 1 Device 0     order for DME Equipment being ordered: Walker Wheels () and Walker ()  Treatment Diagnosis: Impaired gait, heel WB bilaterally. 1 each 0     pantoprazole (PROTONIX) 40 MG enteric coated tablet Take 1 tablet (40 mg) by mouth every morning (before breakfast) 30 tablet 0     potassium chloride ER (K-DUR/KLOR-CON M) 10 MEQ CR tablet Take 1 tablet (10 mEq) by mouth 2 times daily 180 tablet 0     Allergies   Allergen Reactions     No Known  Allergies      Recent Labs   Lab Test 05/07/19  0732 05/06/19  0619  05/05/19  0218  01/18/19  0611 01/17/19  1652  01/13/19  0702  01/11/19  0653  01/05/19  1559  09/06/18  1542  04/21/17  0440  06/30/16  0849   A1C  --   --   --  9.0*  --   --   --   --   --   --   --   --  11.7*  --  9.4*   < >  --    < >  --    LDL  --   --   --   --   --  33  --   --   --   --   --   --   --   --   --   --  55  --  47   HDL  --   --   --   --   --  32*  --   --   --   --   --   --   --   --   --   --  51  --  47   TRIG  --   --   --   --   --  83  --   --   --   --   --   --   --   --   --   --  59  --  75   ALT  --   --   --   --   --   --  17  --  15  --  14  --  29   < > 23  --   --   --   --    CR 1.21 1.23  --  1.28*   < > 2.92* 2.91*   < > 3.45*   < > 3.13*   < > 0.79   < > 0.85   < > 0.72   < >  --    GFRESTIMATED 61 60*   < > 57*   < > 21* 21*   < > 17*   < > 19*   < > >90   < > 90   < > >90  Non  GFR Calc     < >  --    GFRESTBLACK 70 69   < > 66   < > 24* 24*   < > 20*   < > 22*   < > >90   < > >90   < > >90  African American GFR Calc     < >  --    POTASSIUM 4.0 3.8  --  3.9   < > 3.2* 3.7   < > 3.6   < > 3.7   < > 3.7   < > 3.7   < > 3.3*   < >  --    TSH  --   --   --   --   --   --   --   --   --   --   --   --   --   --  0.40  --  3.28  --   --     < > = values in this interval not displayed.      BP Readings from Last 3 Encounters:   06/26/19 130/78   06/26/19 138/90   06/21/19 130/74    Wt Readings from Last 3 Encounters:   06/26/19 112 kg (247 lb)   06/26/19 112.5 kg (248 lb)   06/21/19 112.5 kg (248 lb)                    PROBLEMS TO ADD ON...above   Reviewed and updated as needed this visit by Provider         Review of Systems   ROS COMP: Constitutional, HEENT, cardiovascular, pulmonary, GI, , musculoskeletal, neuro, skin, endocrine and psych systems are negative, except as otherwise noted.      Objective    /78   Pulse 70   Temp 97.9  F (36.6  C) (Oral)   Resp 16   Ht 1.93 m (6'  "4\")   Wt 112 kg (247 lb)   SpO2 95%   BMI 30.07 kg/m    Body mass index is 30.07 kg/m .  Physical Exam   GENERAL: healthy, alert and no distress  EYES: Eyes grossly normal to inspection, PERRL and conjunctivae and sclerae normal  HENT: ear canals and TM's normal, nose and mouth without ulcers or lesions  NECK: no adenopathy, no asymmetry, masses, or scars and thyroid normal to palpation  RESP: lungs clear to auscultation - no rales, rhonchi or wheezes  CV: regular rate and rhythm, normal S1 S2, no S3 or S4, no murmur, click or rub, no peripheral edema and peripheral pulses strong  ABDOMEN: soft, nontender, no hepatosplenomegaly, no masses and bowel sounds normal  MS: no gross musculoskeletal defects noted, no edema  SKIN: no suspicious lesions or rashes  NEURO: Normal strength and tone, mentation intact and speech normal    Diagnostic Test Results:  Labs reviewed in Epic  none         Assessment & Plan       ICD-10-CM    1. Cough R05    2. Pneumonia of right lower lobe due to infectious organism (H) J18.1     Resolved     3. History of CVA (cerebrovascular accident) Z86.73    4. Type 2 diabetes mellitus with complication, unspecified whether long term insulin use (H) E11.8    5. Ischemic cardiomyopathy I25.5    6. Chronic systolic congestive heart failure (H) I50.22    Discussed viral versus bacterial illnesses along with typical course of progression, and no signs or symptoms of bacterial infection at this point was treated with antibiotics for pneumonia that has resolved, no fever or dyspnea, pt with complex health history noted above, not monitoring his weights, DM with questionable control and using patch \"with essential oils and other things that controls it very well\" unclear how he is using his insulin. Recommended he establish with Primary Care Provider and have thorough DM visit and HF follow up   Consider CXR end of July early Aug to check for resolution, lungs clear today and instructed to work on pnd " "and sinus washes see patient instructions   Symptom management: gargle salt water, honey in tea or warm water, plenty of rest and extra fluids. Reviewed signs of dehydration when to seek care      BMI:   Estimated body mass index is 30.07 kg/m  as calculated from the following:    Height as of this encounter: 1.93 m (6' 4\").    Weight as of this encounter: 112 kg (247 lb).   Weight management plan: Patient was referred to their PCP to discuss a diet and exercise plan.    Follow up for establish care and DM visit within the next few weeks-month at the most and pt agrees to do so    No follow-ups on file.    LORRAINE Feldman [facility]        "

## 2019-07-05 ENCOUNTER — APPOINTMENT (OUTPATIENT)
Dept: CT IMAGING | Facility: CLINIC | Age: 69
DRG: 247 | End: 2019-07-05
Attending: EMERGENCY MEDICINE
Payer: COMMERCIAL

## 2019-07-05 ENCOUNTER — APPOINTMENT (OUTPATIENT)
Dept: GENERAL RADIOLOGY | Facility: CLINIC | Age: 69
DRG: 247 | End: 2019-07-05
Attending: EMERGENCY MEDICINE
Payer: COMMERCIAL

## 2019-07-05 ENCOUNTER — HOSPITAL ENCOUNTER (INPATIENT)
Facility: CLINIC | Age: 69
LOS: 3 days | Discharge: HOME | DRG: 247 | End: 2019-07-09
Attending: EMERGENCY MEDICINE | Admitting: INTERNAL MEDICINE
Payer: COMMERCIAL

## 2019-07-05 DIAGNOSIS — R07.9 CHEST PAIN, UNSPECIFIED TYPE: ICD-10-CM

## 2019-07-05 DIAGNOSIS — I48.20 CHRONIC ATRIAL FIBRILLATION (H): ICD-10-CM

## 2019-07-05 DIAGNOSIS — I50.23 ACUTE ON CHRONIC SYSTOLIC CONGESTIVE HEART FAILURE (H): ICD-10-CM

## 2019-07-05 DIAGNOSIS — I20.0 UNSTABLE ANGINA (H): Primary | ICD-10-CM

## 2019-07-05 DIAGNOSIS — R29.898 BILATERAL ARM WEAKNESS: ICD-10-CM

## 2019-07-05 DIAGNOSIS — E11.00 TYPE 2 DIABETES MELLITUS WITH HYPEROSMOLARITY WITHOUT COMA, WITH LONG-TERM CURRENT USE OF INSULIN (H): ICD-10-CM

## 2019-07-05 DIAGNOSIS — Z79.4 TYPE 2 DIABETES MELLITUS WITH HYPEROSMOLARITY WITHOUT COMA, WITH LONG-TERM CURRENT USE OF INSULIN (H): ICD-10-CM

## 2019-07-05 LAB
ANION GAP SERPL CALCULATED.3IONS-SCNC: 7 MMOL/L (ref 3–14)
BASOPHILS # BLD AUTO: 0 10E9/L (ref 0–0.2)
BASOPHILS NFR BLD AUTO: 0.6 %
BUN SERPL-MCNC: 17 MG/DL (ref 7–30)
CALCIUM SERPL-MCNC: 8.8 MG/DL (ref 8.5–10.1)
CHLORIDE SERPL-SCNC: 104 MMOL/L (ref 94–109)
CO2 SERPL-SCNC: 24 MMOL/L (ref 20–32)
CREAT SERPL-MCNC: 0.99 MG/DL (ref 0.66–1.25)
DIFFERENTIAL METHOD BLD: ABNORMAL
EOSINOPHIL # BLD AUTO: 0.2 10E9/L (ref 0–0.7)
EOSINOPHIL NFR BLD AUTO: 2.4 %
ERYTHROCYTE [DISTWIDTH] IN BLOOD BY AUTOMATED COUNT: 13.3 % (ref 10–15)
GFR SERPL CREATININE-BSD FRML MDRD: 77 ML/MIN/{1.73_M2}
GLUCOSE BLDC GLUCOMTR-MCNC: 212 MG/DL (ref 70–99)
GLUCOSE SERPL-MCNC: 240 MG/DL (ref 70–99)
HCT VFR BLD AUTO: 38.2 % (ref 40–53)
HGB BLD-MCNC: 12.5 G/DL (ref 13.3–17.7)
IMM GRANULOCYTES # BLD: 0 10E9/L (ref 0–0.4)
IMM GRANULOCYTES NFR BLD: 0.1 %
LYMPHOCYTES # BLD AUTO: 1.1 10E9/L (ref 0.8–5.3)
LYMPHOCYTES NFR BLD AUTO: 16.8 %
MCH RBC QN AUTO: 28.7 PG (ref 26.5–33)
MCHC RBC AUTO-ENTMCNC: 32.7 G/DL (ref 31.5–36.5)
MCV RBC AUTO: 88 FL (ref 78–100)
MONOCYTES # BLD AUTO: 0.5 10E9/L (ref 0–1.3)
MONOCYTES NFR BLD AUTO: 6.9 %
NEUTROPHILS # BLD AUTO: 5 10E9/L (ref 1.6–8.3)
NEUTROPHILS NFR BLD AUTO: 73.2 %
NRBC # BLD AUTO: 0 10*3/UL
NRBC BLD AUTO-RTO: 0 /100
PLATELET # BLD AUTO: 246 10E9/L (ref 150–450)
POTASSIUM SERPL-SCNC: 4.4 MMOL/L (ref 3.4–5.3)
RBC # BLD AUTO: 4.36 10E12/L (ref 4.4–5.9)
SODIUM SERPL-SCNC: 135 MMOL/L (ref 133–144)
TROPONIN I SERPL-MCNC: <0.015 UG/L (ref 0–0.04)
WBC # BLD AUTO: 6.8 10E9/L (ref 4–11)

## 2019-07-05 PROCEDURE — 25000132 ZZH RX MED GY IP 250 OP 250 PS 637: Performed by: PHYSICIAN ASSISTANT

## 2019-07-05 PROCEDURE — 84484 ASSAY OF TROPONIN QUANT: CPT | Performed by: EMERGENCY MEDICINE

## 2019-07-05 PROCEDURE — 70450 CT HEAD/BRAIN W/O DYE: CPT

## 2019-07-05 PROCEDURE — 99220 ZZC INITIAL OBSERVATION CARE,LEVL III: CPT | Performed by: PHYSICIAN ASSISTANT

## 2019-07-05 PROCEDURE — 80048 BASIC METABOLIC PNL TOTAL CA: CPT | Performed by: EMERGENCY MEDICINE

## 2019-07-05 PROCEDURE — 25000132 ZZH RX MED GY IP 250 OP 250 PS 637: Performed by: EMERGENCY MEDICINE

## 2019-07-05 PROCEDURE — 85025 COMPLETE CBC W/AUTO DIFF WBC: CPT | Performed by: EMERGENCY MEDICINE

## 2019-07-05 PROCEDURE — G0378 HOSPITAL OBSERVATION PER HR: HCPCS

## 2019-07-05 PROCEDURE — 84484 ASSAY OF TROPONIN QUANT: CPT | Performed by: PHYSICIAN ASSISTANT

## 2019-07-05 PROCEDURE — 71046 X-RAY EXAM CHEST 2 VIEWS: CPT

## 2019-07-05 PROCEDURE — 99285 EMERGENCY DEPT VISIT HI MDM: CPT | Mod: 25

## 2019-07-05 PROCEDURE — 93005 ELECTROCARDIOGRAM TRACING: CPT

## 2019-07-05 PROCEDURE — 00000146 ZZHCL STATISTIC GLUCOSE BY METER IP

## 2019-07-05 PROCEDURE — 36415 COLL VENOUS BLD VENIPUNCTURE: CPT | Performed by: PHYSICIAN ASSISTANT

## 2019-07-05 RX ORDER — CARVEDILOL 25 MG/1
25 TABLET ORAL 2 TIMES DAILY WITH MEALS
Status: DISCONTINUED | OUTPATIENT
Start: 2019-07-05 | End: 2019-07-09 | Stop reason: HOSPADM

## 2019-07-05 RX ORDER — ATORVASTATIN CALCIUM 40 MG/1
40 TABLET, FILM COATED ORAL EVERY EVENING
Status: DISCONTINUED | OUTPATIENT
Start: 2019-07-05 | End: 2019-07-09 | Stop reason: HOSPADM

## 2019-07-05 RX ORDER — ASPIRIN 81 MG/1
81 TABLET ORAL DAILY
Status: DISCONTINUED | OUTPATIENT
Start: 2019-07-06 | End: 2019-07-08

## 2019-07-05 RX ORDER — FUROSEMIDE 40 MG
80 TABLET ORAL DAILY
Status: DISCONTINUED | OUTPATIENT
Start: 2019-07-06 | End: 2019-07-08

## 2019-07-05 RX ORDER — NITROGLYCERIN 0.4 MG/1
0.4 TABLET SUBLINGUAL EVERY 5 MIN PRN
Status: DISCONTINUED | OUTPATIENT
Start: 2019-07-05 | End: 2019-07-08

## 2019-07-05 RX ORDER — ACETAMINOPHEN 325 MG/1
650 TABLET ORAL EVERY 4 HOURS PRN
Status: DISCONTINUED | OUTPATIENT
Start: 2019-07-05 | End: 2019-07-06

## 2019-07-05 RX ORDER — LISINOPRIL 5 MG/1
5 TABLET ORAL DAILY
Status: DISCONTINUED | OUTPATIENT
Start: 2019-07-06 | End: 2019-07-08

## 2019-07-05 RX ORDER — NITROGLYCERIN 0.4 MG/1
0.4 TABLET SUBLINGUAL EVERY 5 MIN PRN
Status: DISCONTINUED | OUTPATIENT
Start: 2019-07-05 | End: 2019-07-05

## 2019-07-05 RX ORDER — PANTOPRAZOLE SODIUM 40 MG/1
40 TABLET, DELAYED RELEASE ORAL
Status: DISCONTINUED | OUTPATIENT
Start: 2019-07-06 | End: 2019-07-09 | Stop reason: HOSPADM

## 2019-07-05 RX ORDER — ASPIRIN 81 MG/1
324 TABLET, CHEWABLE ORAL ONCE
Status: COMPLETED | OUTPATIENT
Start: 2019-07-05 | End: 2019-07-05

## 2019-07-05 RX ORDER — ASPIRIN 81 MG/1
81 TABLET ORAL DAILY
Status: ON HOLD | COMMUNITY
End: 2019-07-09

## 2019-07-05 RX ORDER — ALUMINA, MAGNESIA, AND SIMETHICONE 2400; 2400; 240 MG/30ML; MG/30ML; MG/30ML
30 SUSPENSION ORAL EVERY 4 HOURS PRN
Status: DISCONTINUED | OUTPATIENT
Start: 2019-07-05 | End: 2019-07-06

## 2019-07-05 RX ORDER — NICOTINE POLACRILEX 4 MG
15-30 LOZENGE BUCCAL
Status: DISCONTINUED | OUTPATIENT
Start: 2019-07-05 | End: 2019-07-06

## 2019-07-05 RX ORDER — DEXTROSE MONOHYDRATE 25 G/50ML
25-50 INJECTION, SOLUTION INTRAVENOUS
Status: DISCONTINUED | OUTPATIENT
Start: 2019-07-05 | End: 2019-07-06

## 2019-07-05 RX ORDER — POTASSIUM CHLORIDE 750 MG/1
10 TABLET, EXTENDED RELEASE ORAL 2 TIMES DAILY
Status: DISCONTINUED | OUTPATIENT
Start: 2019-07-05 | End: 2019-07-09 | Stop reason: HOSPADM

## 2019-07-05 RX ORDER — LIDOCAINE 40 MG/G
CREAM TOPICAL
Status: DISCONTINUED | OUTPATIENT
Start: 2019-07-05 | End: 2019-07-06

## 2019-07-05 RX ORDER — ISOSORBIDE MONONITRATE 30 MG/1
30 TABLET, EXTENDED RELEASE ORAL DAILY
Status: DISCONTINUED | OUTPATIENT
Start: 2019-07-06 | End: 2019-07-09 | Stop reason: HOSPADM

## 2019-07-05 RX ORDER — NALOXONE HYDROCHLORIDE 0.4 MG/ML
.1-.4 INJECTION, SOLUTION INTRAMUSCULAR; INTRAVENOUS; SUBCUTANEOUS
Status: DISCONTINUED | OUTPATIENT
Start: 2019-07-05 | End: 2019-07-06

## 2019-07-05 RX ADMIN — ASPIRIN 81 MG 324 MG: 81 TABLET ORAL at 14:15

## 2019-07-05 RX ADMIN — ACETAMINOPHEN 650 MG: 325 TABLET, FILM COATED ORAL at 21:33

## 2019-07-05 RX ADMIN — POTASSIUM CHLORIDE 10 MEQ: 750 TABLET, FILM COATED, EXTENDED RELEASE ORAL at 19:36

## 2019-07-05 RX ADMIN — APIXABAN 5 MG: 5 TABLET, FILM COATED ORAL at 19:36

## 2019-07-05 RX ADMIN — NITROGLYCERIN 0.4 MG: 0.4 TABLET SUBLINGUAL at 14:14

## 2019-07-05 RX ADMIN — ATORVASTATIN CALCIUM 40 MG: 40 TABLET, FILM COATED ORAL at 19:36

## 2019-07-05 RX ADMIN — NITROGLYCERIN 0.4 MG: 0.4 TABLET SUBLINGUAL at 13:14

## 2019-07-05 ASSESSMENT — MIFFLIN-ST. JEOR: SCORE: 1973.73

## 2019-07-05 ASSESSMENT — ENCOUNTER SYMPTOMS
NECK PAIN: 1
SHORTNESS OF BREATH: 1
DIAPHORESIS: 0
FATIGUE: 1
VOMITING: 0
WEAKNESS: 1
NAUSEA: 1

## 2019-07-05 NOTE — Clinical Note
Stent deployed in the middle right coronary artery. Max pressure = 10 luiz. Total duration = 30 seconds. Balloon reinflated a second time: Max pressure = 18 luiz. Total duration = 25 seconds. Balloon reinflated a third time: Max pressure = 9 luiz. Total duration = 20 seconds. Balloon reinflated a fourth time: Max pressure = 11 luiz. Total duration = 10 seconds.

## 2019-07-05 NOTE — Clinical Note
The first balloon was inserted into the right coronary artery and middle right coronary artery.Max pressure = 18 luiz. Total duration = 45 seconds.     Max pressure = 20 luiz. Total duration = 20 seconds.    Balloon reinflated a second time: Max pressure = 20 luiz. Total duration = 20 seconds.  Balloon reinflated a third time: Max pressure = 22 luiz. Total duration = 40 seconds.  Balloon reinflated a fourth time: Max pressure = 23 luiz. Total duration = 16 seconds.  Balloon reinflated a fourth time: Max pressure = 23 luiz. Total duration = 30 seconds. midRCA

## 2019-07-05 NOTE — Clinical Note
The first balloon was inserted into the right coronary artery and middle right coronary artery.Max pressure = 10 luiz. Total duration = 30 seconds.     Max pressure = 10 luiz. Total duration = 30 seconds.    Balloon reinflated a second time: Max pressure = 10 luiz. Total duration = 30 seconds.

## 2019-07-05 NOTE — ED TRIAGE NOTES
"Patient with history of 7 heart stents. States he just doesn't \"feel right\". Has felt like this for the past two days. C/o chest pain and arms feeling weak. \"If I do anything I have to lay down\"  "

## 2019-07-05 NOTE — LETTER
Key Recommendations:  CTS identifies pt as high risk due to Elevated ANDRIA. Patient admitted on 7/5/19 with chest pain, cardiology consulted and following. Plan is for a coronary angiography. Patient has a history of diabetes on insulin, Afib on Eliquis, cardiomyopathy, HTN, and hypothyroidism. Patient follows with podiatry and cardiology [facility] outpatient specialty. Patient has had 3 hospitalizations within the last 6 months, in January for CHF exacerbation, in March for cellulitis and osteomyelitis, and in May for cellulitis.   Patient is currently not established with primary care. Patient did not want any resources at this time. Patient states that transportation is difficult as he relies on his wife for transportation for appointments and shopping. Patient was resistant to considering other transportation options. Patient does not have any services currently and is not interested in home care.   AVS discharge instructions were updated for the pt to schedule an appointment to establish care in a primary care clinic.     Recommendations at discharge include ***    Miracle Salvador    AVS/Discharge Summary is the source of truth; this is a helpful guide for improved communication of patient story

## 2019-07-05 NOTE — H&P
[facility] Outpatient / Observation Unit  History and Physical Exam     Jayro Elder MRN# [id number]   YOB: 1950 Age: 69 year old      Date of Admission:  7/5/2019    Primary care provider: No Ref-Primary, Physician          Assessment:   Jayro Elder is a 69 year old male with a PMH significant for DM, CAD with LAD multiple LAD stents and OM3 stent, a fib on Eliquis, HTN, systolic CHF with EF 35-40%, PVD, hx of CVA, MARISOL and hx of tobacco use, who presents with exertional chest pain.   Work up in ED reveals: VSS. BMP is unremarkable other than glucose of 240. Troponin is undetectable. CBC is fairly unremarkable, Hgb is stable at 12.5. CXR is clear. EKG a fib, RBBB, inferior and anterior infarct. CT head is unremarkable. He received 324 mg PO ASA and sl nitroglycerin x2 in the ED.   Patient is being registered to observation for further evaluation and to rule out possible ACS.     1. Acute Chest pain: suspect cardiac source. Known hx of CAD with multiple repeat stents to LAD. Chest pain has been exertional for the past week with associated SOB, diaphoresis and nausea. Notes radiation of pain to the jaw and left arm. Pain resolves with rest. Initial work up in ED is non ischemic. Will monitor on tele, trend troponins, get an echocardiogram in AM and consult cardiology for further recommendations. Resume home meds  2. DM - managed with 36 units of lantus plus meal time insulin. Will resume lantus for now, hold meal time insulin for now while on OBS. Diabetic diet  3. HTN - resume coreg, lisinopril (with parameters) and lasix  4. Systolic CHF - EF 35-40%. Denies increased LE edema, notes orthopnea since dx with pna 2 weeks ago, cough has improved/resolved. Does not appear volume overloaded. Resume home lasix  5. MARISOL - does not use CPAP  6. A fib - currently in a fib, rate controlled. Resume home Coreg and Eliquis.          Plan:     1. [place] to Observation  2. Continue telemetry  3. Follow serial  troponins  4. Echocardiogram  5. Cont Aspirin EC 81 mg po daily and Eliquis  6. Blood pressure control  7. Morphine and nitroglycerine PRN for pain    8. Carbohydrate Controlled Diet diet, No caffeine if Nuclear testing selected  9. DVT prophylaxis: pt at low risk, encourage ambulation  10. Code Status: full code  11. Dispo: pending cardiology recommendations.                   Chief Complaint:   Chest Pain         History of Present Illness:   Jayro Elder is a 69 year old male with a PMH significant for DM, CAD with LAD multiple LAD stents and OM3 stent, HTN, a fib on Eliquis, systolic CHF with EF 35-40%, PVD, hx of CVA, MARISOL and hx of tobacco use, who presents with exertional chest pain. Pt reports a 1 week hx of exertional chest pain/pressure that radiates to his neck/jaw and left shoulder. This is associated with SOB, diaphoresis, mild nausea and fatigue. He states these symptoms are unlike previous sx related to his heart. The chest pain does solve with rest after 20-30 min. He denies fever, chills, abd pain, vomiting, diarrhea or dysuria. He notes that he was dx with pneumonia 2 weeks ago and his cough has resolved but he does continue to note some mild SOB with lying down. He denies LE edema or weight gain.              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     Cardiomyopathy (H)      Cellulitis of left lower extremity 3/13/2018     Cerebral infarction (H)     eye sight decreased in peripheral of right side and blurry     Diabetic ulcer of left midfoot associated with type 2 diabetes mellitus, with fat layer exposed (H) 3/13/2018     Essential hypertension, benign 11/13/2002     Generalized osteoarthrosis, unspecified site 11/13/2002     Microalbuminuria 3/13/2018    X1     Myocardial infarction (H)      Neuropathy      Sepsis (H)       Sleep apnea     doesn't use it     Substance abuse (H)      Syncope, unspecified syncope type 3/13/2018     Thyroid disease     takes medicine     Tobacco use disorder 11/13/2002     Type 2 diabetes mellitus with diabetic peripheral angiopathy without gangrene, unspecified long term insulin use status 2005               Past Surgical History:     Past Surgical History:   Procedure Laterality Date     AMPUTATE TOE(S) Right 1/6/2019    Procedure: Right open second toe partial amputation;  Surgeon: Sabino Garcia DPM;  Location: [facility] OR     AMPUTATE TOE(S) Right 1/8/2019    Procedure: 1.  Revision right second toe amputation with resection of distal half of proximal phalanx with full closure.    2.  Debridement of callus/preulcerative lesion, distal left second toe and plantar left first metatarsophalangeal joint.;  Surgeon: Ryan Bhagat DPM;  Location: [facility] OR     AMPUTATE TOE(S) Right 3/12/2019    Procedure: Partial right fourth toe amputation.;  Surgeon: Ryan Bhagat DPM;  Location: [facility] OR     AMPUTATE TOE(S) Right 5/6/2019    Procedure: Right partial third toe amputation;  Surgeon: Татьяна Mckeon DPM, Podiatry/Foot and Ankle Surgery;  Location: [facility] OR     ANGIOGRAM  6/29/05    subtotal occ.mid LAD,SUSAN mid LAD     ANGIOGRAM  7/05    mild CAD,patent stent,no flow-limiting lesions,sev.LV dysf.LAD enlarged     ANGIOGRAM  2/09    Sev.single vessel disease,Mod LV dysf.distal LAD 90%,70-75% mid lad just before prev stent,SUSAN to prox.mid LAD, endeavor to distal LAD     ANGIOGRAM  11/13/13    restenosis, stent LAD     BACK SURGERY      back surgery x3     C NONSPECIFIC PROCEDURE      Laminectomy x 3 - (1983 x 2 & 1990)     C NONSPECIFIC PROCEDURE Bilateral 1998    Bunionectomy     C NONSPECIFIC PROCEDURE  1959    Gingival surgery at age 9     HEART CATH LEFT HEART CATH  06/13/2017    SUSAN to OM3     INCISION AND DRAINAGE TOE, COMBINED Bilateral 9/11/2018    Procedure: COMBINED INCISION AND DRAINAGE TOE;  1)  Irrigation and debridement ulcer left great toe  2) Amputation 3rd toe right foot at distal interphalangeal joint level;  Surgeon: Miki Harp MD;  Location: [facility] OR     IRRIGATION AND DEBRIDEMENT FOOT, COMBINED Left 3/12/2019    Procedure: Debridement of left foot ulcer sub first MPJ 2 x 2.5 cm down to and including subcutaneous tissue, callus debridement for preulcerative lesion, left second toe.;  Surgeon: Ryan Bhagat DPM;  Location: [facility] OR     ORTHOPEDIC SURGERY      bunion surgery both feet     ORTHOPEDIC SURGERY      left shoulder     SOFT TISSUE SURGERY      debridement of toe numerous time     VASCULAR SURGERY      7 stents in heart               Social History:     Social History     Socioeconomic History     Marital status:      Spouse name: Not on file     Number of children: Not on file     Years of education: Not on file     Highest education level: Not on file   Occupational History     Not on file   Social Needs     Financial resource strain: Not on file     Food insecurity:     Worry: Not on file     Inability: Not on file     Transportation needs:     Medical: Not on file     Non-medical: Not on file   Tobacco Use     Smoking status: Former Smoker     Packs/day: 1.00     Years: 25.00     Pack years: 25.00     Types: Cigarettes     Last attempt to quit: 2005     Years since quittin.9     Smokeless tobacco: Never Used   Substance and Sexual Activity     Alcohol use: No     Alcohol/week: 2.4 oz     Types: 4 Shots of liquor per week     Frequency: Never     Comment: almost every day     Drug use: No     Sexual activity: Yes     Partners: Female   Lifestyle     Physical activity:     Days per week: Not on file     Minutes per session: Not on file     Stress: Not on file   Relationships     Social connections:     Talks on phone: Not on file     Gets together: Not on file     Attends [employer] service: Not on file     Active member of club or organization: Not on file      Attends meetings of clubs or organizations: Not on file     Relationship status: Not on file     Intimate partner violence:     Fear of current or ex partner: Not on file     Emotionally abused: Not on file     Physically abused: Not on file     Forced sexual activity: Not on file   Other Topics Concern     Parent/sibling w/ CABG, MI or angioplasty before 65F 55M? Yes      Service Not Asked     Blood Transfusions Not Asked     Caffeine Concern No     Comment: 3 cups daily     Occupational Exposure Not Asked     Hobby Hazards Not Asked     Sleep Concern Not Asked     Stress Concern Not Asked     Weight Concern Not Asked     Special Diet Yes     Comment: watching carb intake     Back Care Not Asked     Exercise No     Comment: some walking     Bike Helmet Not Asked     Seat Belt Not Asked     Self-Exams Not Asked   Social History Narrative     Not on file               Family History:     Family History   Problem Relation Age of Onset     Cancer - colorectal Sister      Thyroid Disease Brother      Cardiovascular Brother      Diabetes Brother      Cancer Father         tumor in chest and throat     Arthritis Mother      Thyroid Disease Mother      Diabetes Mother      Glaucoma No family hx of      Macular Degeneration No family hx of               Allergies:      Allergies   Allergen Reactions     No Known Allergies                Medications:     Prior to Admission medications    Medication Sig Last Dose Taking? Auth Provider   apixaban ANTICOAGULANT (ELIQUIS) 5 MG tablet Take 1 tablet (5 mg) by mouth 2 times daily 7/5/2019 at am Yes Gill Doty PA   aspirin 81 MG EC tablet Take 81 mg by mouth daily 7/5/2019 at am Yes Unknown, Entered By History   atorvastatin (LIPITOR) 40 MG tablet Take 1 tablet (40 mg) by mouth every evening 7/4/2019 at pm Yes Gill Doty PA   carvedilol (COREG) 25 MG tablet Take 1 tablet (25 mg) by mouth 2 times daily (with meals) 7/5/2019 at am Yes Gill Doty PA    Cholecalciferol (VITAMIN D3) 2000 units TABS Take 1 tablet by mouth daily 7/5/2019 at am Yes Unknown, Entered By History   furosemide (LASIX) 80 MG tablet Take 1 tablet (80 mg) by mouth daily 7/4/2019 at am Yes Gill Doty PA   insulin aspart (NOVOLOG FLEXPEN) 100 UNIT/ML pen Inject 8 Units Subcutaneous daily (with lunch) 7/4/2019 at noon Yes Unknown, Entered By History   insulin aspart (NOVOLOG FLEXPEN) 100 UNIT/ML pen Inject 10 Units Subcutaneous daily (with dinner) 7/4/2019 at Unknown time Yes Unknown, Entered By History   insulin aspart (NOVOLOG FLEXPEN) 100 UNIT/ML pen Inject Subcutaneous 3 times daily (with meals) Sliding Scale  Do not give sliding scale insulin if Pre-Meal BG less than 140.   For Pre-Meal:   - 200 give 2 units.    - 250 give 4 units.    - 300 give 6 units.    - 350 give 8 units.    - 400 give 10 units. 7/4/2019 at Unknown time Yes Unknown, Entered By History   insulin aspart (NOVOLOG PEN) 100 UNIT/ML pen Inject 8 Units Subcutaneous daily (with breakfast)  7/5/2019 at only 4 units Yes Unknown, Entered By History   insulin glargine (LANTUS SOLOSTAR PEN) 100 UNIT/ML pen Inject 36 Units Subcutaneous every morning 7/5/2019 at am Yes Unknown, Entered By History   isosorbide mononitrate (IMDUR) 30 MG 24 hr tablet Take 1 tablet (30 mg) by mouth daily 7/5/2019 at am Yes Johnathan Mckeon MD   levothyroxine (SYNTHROID, LEVOTHROID) 137 MCG tablet Take 137 mcg by mouth daily  7/5/2019 at am Yes Henrry Figueroa PA-C   lisinopril (PRINIVIL/ZESTRIL) 5 MG tablet Take 1 tablet (5 mg) by mouth daily 7/5/2019 at am Yes Gill Doty PA   NONFORMULARY Topical patch for diabetes (blood glucose control) - changes every 4 days or so Past Week at does not have on now Yes Unknown, Entered By History   pantoprazole (PROTONIX) 40 MG enteric coated tablet Take 1 tablet (40 mg) by mouth every morning (before breakfast) 7/5/2019 at am Yes Ana Frankel MD   potassium  chloride ER (K-DUR/KLOR-CON M) 10 MEQ CR tablet Take 1 tablet (10 mEq) by mouth 2 times daily 7/5/2019 at am Yes Justin Tomlin MD   insulin pen needle 31G X 6 MM Use  as directed. Unknown at Unknown time  Vinicio Cook MD   nitroglycerin (NITROSTAT) 0.4 MG sublingual tablet Place 1 tablet (0.4 mg) under the tongue every 5 minutes as needed for chest pain Unknown at Unknown time  Davion Cordova PA-C   order for DME Equipment being ordered: Other: surgical shoe male XL  Treatment Diagnosis: sp right 2nd toe amputaion Unknown at Unknown time  Ryan Bhagat DPM   order for DME Equipment being ordered: Walker Wheels () and Walker ()  Treatment Diagnosis: Impaired gait, heel WB bilaterally. Unknown at Unknown time  Herb Zurita III, MD              Review of Systems:   A Comprehensive greater than 10 system review of systems was carried out.  Pertinent positives and negatives are noted above.  Otherwise negative for contributory information.     Constitutional, neuro, ENT, endocrine, pulmonary, cardiac, gastrointestinal, genitourinary, musculoskeletal, integument and psychiatric systems are negative, except as otherwise noted.           Physical Exam:   Blood pressure (!) 155/102, pulse 69, temperature 97.7  F (36.5  C), temperature source Oral, resp. rate 13, SpO2 97 %.    GENERAL:  Comfortable.  PSYCH: pleasant, oriented, No acute distress.  HEENT:  Atraumatic, normocephalic. Normal conjunctiva, normal hearing, and oropharynx is normal.  NECK:  Supple, no neck vein distention  HEART:  Irregularly irregular. Normal S1, S2 with no murmur, no pericardial rub, gallops or S3 or S4.  LUNGS:  Clear to auscultation, normal Respiratory effort. No wheezing, rales or ronchi.  GI:  Soft, normal bowel sounds. Non-tender, non distended.   EXTREMITIES:  No pedal edema, +2 pulses bilateral and equal.  SKIN:  Dry to touch, No rash, wound or ulcerations.  NEUROLOGIC:  CN 2-12 intact, BL 5/5  symmetric upper and lower extremity strength, sensation is intact with no focal deficits.            Data:     EKG demonstrates:  atrial fibrillation, Right Bundle Branch Block, inferior and anterior infarct.    Recent Labs   Lab 07/05/19  1301   WBC 6.8   HGB 12.5*   HCT 38.2*   MCV 88        Recent Labs   Lab 07/05/19  1301      POTASSIUM 4.4   CHLORIDE 104   CO2 24   ANIONGAP 7   *   BUN 17   CR 0.99   GFRESTIMATED 77   GFRESTBLACK 90   BETTIE 8.8     Recent Labs   Lab 07/05/19  1301   TROPI <0.015       Recent Results (from the past 48 hour(s))   XR Chest 2 Views    Narrative    XR CHEST 2 VW 7/5/2019 1:44 PM    HISTORY: Pain.    COMPARISON: June 21, 2019.       Impression    IMPRESSION: The lungs are clear. No focal pulmonary opacities. No  pleural effusions or pneumothorax. Heart size is upper limits of  normal as before.     MAHI COLE MD   CT Head w/o Contrast    Narrative    CT SCAN OF THE HEAD WITHOUT CONTRAST   7/5/2019 1:53 PM     HISTORY: L>R arm wakness    TECHNIQUE:  Axial images of the head and coronal reformations without  IV contrast material. Radiation dose for this scan was reduced using  automated exposure control, adjustment of the mA and/or kV according  to patient size, or iterative reconstruction technique.    COMPARISON: Scan dated 9/6/2018    FINDINGS:     Intracranial contents: There is diffuse parenchymal volume loss.   White matter changes are present in the cerebral hemispheres that are  consistent with small vessel ischemic disease in this age patient.  There is a chronic area of encephalomalacia in the left occipital  region. This is unchanged. There is no evidence of intracranial  hemorrhage, mass, acute infarct or anomaly.    Visualized orbits/sinuses/mastoids:  The visualized portions of the  sinuses and mastoids appear normal.    Osseous structures/soft tissues:  There is no evidence of trauma.      Impression    IMPRESSION:   1. No acute pathology is  identified. No bleed, mass, or acute infarcts  are seen.  2. Chronic encephalomalacia in the left occipital lobe.      MD Isabelle WARD PA-C

## 2019-07-05 NOTE — ED PROVIDER NOTES
"  History     Chief Complaint:  Chest pain    HPI   Jayro Elder is a 69 year old male with a history of hypertension, diabetes, coronary artery disease s/p stent placement, cardiomyopathy, cerebral infarction, atrial fibrillation on Eliquis who presents with chest pain. The patient reports two days of fatigue, exertional shortness of breath, left sided chest pain with radiation into his neck and left shoulder exacerbated with exertion, arm weakness (L>R), and nausea. He notes that his current pain, rated at a 2/10, reminds him of the \"groaning\" chest pain he felt at the onset of his stoke, which eventually developed into a loss of peripheral vision and \"flashing lights.\" However, he also mentions that his initial stroke pain did not effect his limbs, and he denies any visual symptoms currently. The patient denies diaphoresis, emesis, or use of aspirin today. Of note, the patient's wife states that he has had treatments for fluid buildup behind his eyes and had a callous cut out of his left foot last week.    Allergies:  No Known Drug Allergies    Medications:    Eliquis  Lipitor  Coreg  Lasix  Novolog flexpen  Lantus solostar pen  Isosorbide mononitrate  Levothyroxine  Lisinopril  Nitrostat  Protonix  Potassium chloride    Past Medical History:    Coronary artery disease involving native coronary artery of native heart with angina pectoris  Cancer  Cardiomyopathy  Cellulitis of left lower extremity  Cerebral infarction  Diabetic ulcer of left midfoot associated with type 2 diabetes mellitus, with fat layer exposed  Essential hypertension, benign  Generalized osteoarthrosis, unspecified site  Microalbuminuria  Myocardial infarction  Neuropathy  Sepsis  Sleep apnea  Substance abuse  Syncope, unspecified  Thyroid disease  Tobacco use disorder  Type 2 diabetes mellitus with diabetic peripheral angiopathy without gangrene, unspecified long term insulin use status  Benign neoplasm of lower jaw bone  Cardiovascular " disease  Cerebral vascular incident  Chronic atrial fibrillation  Homonymous hemianopsia, right  Syncope, unspecified type  Pressure ulcer of toe of right foot, stage 3  Bacteremia  Congestive heart failure  Hyperlipidemia  Hypothyroidism  Non-toxic nodular goiter  Acute osteomyelitis    Past Surgical History:    Amputate toe, right (4x)  Angiogram (4x)  Back surgery (3x)  Laminectomy (3x)  Bunionectomy  Gingival surgery  Left heart catheterization  Incision and drainage to toe, bilateral  Irrigation and debridement of foot combined, left  Orthopedic surgery, left shoulder  Vascular surgery: 7 stents in heart    Family History:    Sister: colorectal cancer  Brother: thyroid disease, cardiovascular, diabetes  Father: chest and throat cancer  Mother: arthritis, thyroid disease, diabetes    Social History:  The patient was accompanied to the ED by his wife.  Smoking Status: Former Smoker   Packs per day: 1   Years: 25   Types: cigarettes  Smokeless Tobacco: Never Used  Alcohol Use: Positive  Drug Use: Negative  Marital Status:       Review of Systems   Constitutional: Positive for fatigue. Negative for diaphoresis.   Respiratory: Positive for shortness of breath.    Cardiovascular: Positive for chest pain.   Gastrointestinal: Positive for nausea. Negative for vomiting.   Musculoskeletal: Positive for neck pain.        Shoulder pain   Neurological: Positive for weakness.   All other systems reviewed and are negative.      Physical Exam     Patient Vitals for the past 24 hrs:   BP Temp Temp src Pulse Heart Rate Resp SpO2   07/05/19 1517 -- -- -- -- 55 13 97 %   07/05/19 1515 (!) 155/102 -- -- 69 61 13 95 %   07/05/19 1454 -- -- -- -- 71 16 99 %   07/05/19 1435 -- -- -- -- 68 14 99 %   07/05/19 1430 (!) 154/99 -- -- 61 70 21 98 %   07/05/19 1422 -- -- -- -- 71 11 99 %   07/05/19 1419 -- -- -- -- 70 17 99 %   07/05/19 1416 -- -- -- -- -- -- 98 %   07/05/19 1415 148/82 -- -- 54 -- -- --   07/05/19 1337 -- -- -- -- 74  17 96 %   07/05/19 1330 (!) 157/104 -- -- 71 70 15 95 %   07/05/19 1326 -- -- -- -- 75 11 94 %   07/05/19 1315 (!) 157/114 -- -- 73 70 13 97 %   07/05/19 1301 (!) 160/98 -- -- 64 73 14 97 %   07/05/19 1300 (!) 177/102 -- -- -- 73 -- 98 %   07/05/19 1252 (!) 169/117 97.7  F (36.5  C) Oral 77 77 18 98 %     Physical Exam  General:              Well-nourished              Speaking in full sentences  Eyes:              Conjunctiva without injection or scleral icterus  ENT:              Moist mucous membranes              Nares patent              Pinnae normal  Neck:              Full ROM              No stiffness appreciated  Resp:              Lungs CTAB              No crackles, wheezing or audible rubs              Good air movement  CV:                    Normal rate, regular rhythm              S1 and S2 present              No murmur, gallop or rub   No chest wall tenderness  GI:              BS present              Abdomen soft without distention              Non-tender to light and deep palpation              No guarding or rebound tenderness  Skin:              Warm, dry, well perfused              No rashes or open wounds on exposed skin  MSK:              Moves all extremities              No focal deformities or swelling  Neuro:              Alert              Answers questions appropriately              Moves all extremities equally              Gait stable  Psych:              Normal affect, normal mood    Emergency Department Course     ECG:  ECG taken at 1252, ECG read at 1257  Atrial fibrillation  Left axis deviation  Right bundle branch block  Inferior infarct, age undetermined  Anterior infarct, age undetermined  Abnormal ECG  No significant change with EKG dated 5/6/19  Rate 80 bpm. LA interval * ms. QRS duration 120 ms. QT/QTc 428/493 ms. P-R-T axes * -40 -65.    Imaging:  Radiology findings were communicated with the patient who voiced understanding of the findings.    CT Head w/o Contrast  1. No  acute pathology is identified. No bleed, mass, or acute infarcts are seen.  2. Chronic encephalomalacia in the left occipital lobe.  Reading per radiology    XR Chest 2 Views  The lungs are clear. No focal pulmonary opacities. No  pleural effusions or pneumothorax. Heart size is upper limits of  normal as before.   Reading per radiology    Laboratory:  Laboratory findings were communicated with the patient who voiced understanding of the findings.    CBC: WBC 6.8, HGB 12.5 (L),   BMP: Glucose: 240 (H),  o/w WNL (Creatinine 0.99)  Troponin I (Collected 1301): <0.015    Interventions:  1314 Nitrostat 0.4 mg Sublingual  1414 Nitrostat 0.4 mg Sublingual  1415 Aspirin 324 mg Oral    Emergency Department Course:    1246 Nursing notes and vitals reviewed.    1255 I performed an exam of the patient as documented above.     1301 IV was inserted and blood was drawn for laboratory testing, results above.    1341 The patient was sent for an xray chest while in the emergency department, results above.     1349 The patient was sent for a head CT while in the emergency department, results above.     1403 Patient rechecked and updated.      1443 I spoke with Isabelle Desai of the hospitalist service from [facility] regarding patient's presentation, findings, and plan of care.    1507 I spoke with Isabelle Desai of the hospitalist service from [facility] regarding patient's presentation, findings, and plan of care.    1527 Patient rechecked and updated.      1529 I spoke with Dr. Chino of the cardiology service from [facility] regarding patient's presentation, findings, and plan of care.    Prior to admission, I personally reviewed the laboratory and imaging results with the patient and answered all related questions. Patient admitted.     Impression & Plan      Medical Decision Making:  Jayro Elder is a 69 year old male who presents to the emergency department today for evaluation of chest pain.  VS on  presentation reveal elevated BP which improved during patient's emergency department course, though otherwise are unremarkable.  Differential diagnosis is broad, including not limited to, ACS, PE, pneumothorax, pneumonia, atypical reflux, CVA, among others.  Based on the above history and exam, concern is for underlying cardiac ischemia.  Patient has significant risk factors including known coronary artery disease status post stent placement, diabetes, hypertension, hyperlipidemia.  He presents with concerning features including worsening dyspnea and chest pain with exertion, with alleviation at rest.  EKG demonstrates atrial fibrillation, rate controlled, without findings of acute ischemia.  Initial troponin is undetectable.  Patient symptoms here resolved with nitroglycerin.  Full dose aspirin provided.  Chest x-ray demonstrates no evidence of pneumothorax or pneumonia.  PE unlikely given patient is already chronically anticoagulated on Eliquis.  Aortic dissection unlikely in the absence of ripping or tearing pain radiating towards his back.  He does feel as though his left arm is somewhat weaker compared to the right, which I suspect is potentially related to cardiac causes.  No focal objective motor or sensory deficits are noted.  No evidence of intracranial hemorrhage on advanced imaging.  Case discussed with the accepting hospitalist service as I do feel patient requires further cardiac evaluation given his extensive history and concerning features.  I also discussed the case with Dr. zamora from cardiology, who did not feel that patient required emergent angiogram at this time, the recommended serial troponins close monitoring and echocardiogram.  As patient is already on Eliquis, he did not feel further anticoagulation with heparin necessary at this time.  Patient and family updated at bedside.  Questions answered prior to admission    Diagnosis:    ICD-10-CM    1. Chest pain, unspecified type R07.9    2.  Bilateral arm weakness R29.898      Disposition:   The patient is admitted into the care of Isabelle Cortez PA-C, who has accepted for admission for Dr. Vega.    Scribe Disclosure:  I, Bettye Rodriguez, am serving as a scribe at 12:47 PM on 7/5/2019 to document services personally performed by Vinicio Coelho MD based on my observations and the provider's statements to me.    [facility] EMERGENCY DEPARTMENT       Vinicio Coelho MD  07/05/19 0781

## 2019-07-05 NOTE — Clinical Note
Stent deployed in the distal right coronary artery. Max pressure = 8 luiz. Total duration = 30 seconds. Balloon reinflated a second time: Max pressure = 10 luiz. Total duration = 30 seconds. Balloon reinflated a third time: Max pressure = 13 luiz. Total duration = 15 seconds.

## 2019-07-05 NOTE — Clinical Note
The first balloon was inserted into the right coronary artery and distal right coronary artery.Max pressure = 6 luiz. Total duration = 30 seconds.     Max pressure = 6 luiz. Total duration = 30 seconds.    Balloon reinflated a second time: Max pressure = 6 luiz. Total duration = 30 seconds.  Balloon reinflated a third time: Max pressure = 6 luiz. Total duration = 30 seconds.

## 2019-07-05 NOTE — LETTER
Transition Communication Hand-off for Care Transitions to Next Level of Care Provider    Name: Jayro Elder  : 1950  MRN #: [id number]  Primary Care Provider: Physician No Ref-Primary     Primary Clinic: No address on file  Primary Care Clinic Name: (is not currently established in clinic)  Reason for Hospitalization:  Bilateral arm weakness [R29.898]  Chest pain, unspecified type [R07.9]  Admit Date/Time: 2019 12:45 PM  Discharge Date: 19  Payor Source: Payor: HU[employer]NA / Plan: HUMANA MEDICARE ADVANTAGE / Product Type: Medicare /     Readmission Assessment Measure (ANDRIA) Risk Score/category: HIGH           Reason for Communication Hand-off Referral: Fragility    Discharge Plan:       Concern for non-adherence with plan of care:   NO  Discharge Needs Assessment:      Already enrolled in Tele-monitoring program and name of program:  na  Follow-up specialty is recommended: Yes    Follow-up plan:    Future Appointments   Date Time Provider Department Center   7/15/2019  9:10 AM [place] LAB [facility]   7[facility]15/2019 10:10 [facility]   [facility]  8[facility]15 AM [facility]   2019  8:00 AM Татьяна Mckeon DPM, Podiatry/Foot and Ankle Surgery [facility]       Any outstanding tests or procedures:        Referrals     Future Labs/Procedures    CARDIAC REHAB REFERRAL     Process Instructions:    Advance to Wellness and Exercise for Life (WEL) Program or to another maintenance exercise program upon completion of phase 2 cardiac rehab.    Weight mgt program is only offered at [facility].    *This therapy referral will be filtered to a centralized scheduling office at [facility] and the patient will receive a call to schedule an appointment at a [facility] location most convenient for them. *        Comments:    Patient may choose their preference of the site for Cardiac Rehab.      Medication Therapy Management Referral     Process  Instructions:        This referral will be filtered to a centralized scheduling office at [facility] and the patient will receive a call to schedule an appointment at a [facility] location most convenient for them.    Comments:    MTM referral reason            Patient had a hospital or ED visit in last 6 months and has more than 10   PTA or Discharge medications    Patient has 5 PTA or Discharge Medications AND one of the following   diagnoses: DM,HF,COPD,AMI DX,PULM HTN       This service is designed to help you get the most from your medications.  A specially trained pharmacist will work closely with you and your doctors  to solve any problems related to your medications and to help you get the   best results from taking them.      The Medication Therapy Management staff will call you to schedule an appointment.                   Follow-up and recommended labs and tests     F/u with PMD in 1-2 weeks for recheck of BP and BMP after increase in lisinopril from 5 to 10 mg and to monitor blood sugars     Your blood sugar was on the low side on your day of discharge, I think this has to do with having minimal intake yesterday, and so I do expect it to rise when you get home and are eating your normal diet. But-if your am blood sugar is < 100 I would only take 15 units of the lantus and call your clinic to let them know.     F/U with Dr Tomlin of [facility]               Key Recommendations:  CTS identifies pt as high risk due to Elevated ANDRIA. Patient admitted on 7/5/19 with chest pain, cardiology consulted and following. Plan is for a coronary angiography. Patient has a history of diabetes on insulin, Afib on Eliquis, cardiomyopathy, HTN, and hypothyroidism. Patient follows with podiatry and cardiology [facility] outpatient specialty. Patient has had 3 hospitalizations within the last 6 months, in January for CHF exacerbation, in March for cellulitis and osteomyelitis, and in May for  cellulitis.   Patient is currently not established with primary care. Patient did not want any resources at this time. Patient states that transportation is difficult as he relies on his wife for transportation for appointments and shopping. Patient was resistant to considering other transportation options. Patient does not have any services currently and is not interested in home care.   AVS discharge instructions were updated for the pt to schedule an appointment to establish care in a primary care clinic.     Recommendations at discharge include to follow-up with cards.      Miracle Salvador    AVS/Discharge Summary is the source of truth; this is a helpful guide for improved communication of patient story

## 2019-07-05 NOTE — ED NOTES
"[facility]  ED Nurse Handoff Report    Jayro Elder is a 69 year old male   ED Chief complaint: Chest Pain  . ED Diagnosis:   Final diagnoses:   None     Allergies:   Allergies   Allergen Reactions     No Known Allergies        Code Status: Full Code  Activity level - Baseline/Home:  Independent. Activity Level - Current:   Stand by Assist. Lift room needed: No. Bariatric: No   Needed: No   Isolation: No. Infection: Not Applicable.     Vital Signs:   Vitals:    07/05/19 1337 07/05/19 1415 07/05/19 1416 07/05/19 1419   BP:  148/82     Pulse:  54     Resp: 17   17   Temp:       TempSrc:       SpO2: 96%  98% 99%       Cardiac Rhythm:  ,   Cardiac  Cardiac Rhythm: Normal sinus rhythm  Pain level:    Patient confused: No. Patient Falls Risk: Yes.   Elimination Status: Has voided   Patient Report - Initial Complaint: CP. Focused Assessment:    12:50 ED Triage Notes Filed Patient with history of 7 heart stents. States he just doesn't \"feel right\". Has felt like this for the past two days. C/o chest pain and arms feeling weak. \"If I do anything I have to lay down\"   Pt also has a hx of multiple amputated toes, this is not related to today's visit. Pt has extensive medical, procedural, and wound hx.   12:56 Cardiac Cardiac - Cardiac WDL: -WDL except (Weakness, dypnea on exertion, SOB. CP mid sternal and L shoulder,)  Chest Pain Assessment - Chest Pain Reproducible?: No   Cardiac Monitoring - EKG Monitoring: Yes  Cardiac Regularity: Irregular  Cardiac Rhythm: NSR      13:00 Respiratory Respiratory - Respiratory WDL: -WDL except (LS clear, PNA dx 3 weeks ago, pt reports improved until last 2 days. Dypnea on exertion)             Tests Performed: labs, ekg, imaging. Abnormal Results:   Labs Ordered and Resulted from Time of ED Arrival Up to the Time of Departure from the ED   CBC WITH PLATELETS DIFFERENTIAL - Abnormal; Notable for the following components:       Result Value    RBC Count 4.36 (*)  "    Hemoglobin 12.5 (*)     Hematocrit 38.2 (*)     All other components within normal limits   BASIC METABOLIC PANEL - Abnormal; Notable for the following components:    Glucose 240 (*)     All other components within normal limits   TROPONIN I     CT Head w/o Contrast   Preliminary Result   IMPRESSION:    1. No acute pathology is identified. No bleed, mass, or acute infarcts   are seen.   2. Chronic encephalomalacia in the left occipital lobe.         XR Chest 2 Views   Final Result   IMPRESSION: The lungs are clear. No focal pulmonary opacities. No   pleural effusions or pneumothorax. Heart size is upper limits of   normal as before.       MAHI COLE MD        .   Treatments provided: nitroglycerin, ASA, monitoring  Family Comments: spouse at bedside  OBS brochure/video discussed/provided to patient:  Yes  ED Medications:   Medications   nitroGLYcerin (NITROSTAT) sublingual tablet 0.4 mg (0.4 mg Sublingual Given 7/5/19 1414)   aspirin (ASA) chewable tablet 324 mg (324 mg Oral Given 7/5/19 1415)     Drips infusing:  No  For the majority of the shift, the patient's behavior Green. Interventions performed were n/a.     Severe Sepsis OR Septic Shock Diagnosis Present: No      ED Nurse Name/Phone Number: Suyapa Perez,   2:20 PM    RECEIVING UNIT ED HANDOFF REVIEW    Above ED Nurse Handoff Report was reviewed: Yes  Reviewed by: Irena Mendez on July 5, 2019 at 4:12 PM

## 2019-07-05 NOTE — PLAN OF CARE
PRIMARY DIAGNOSIS: CHEST PAIN  OUTPATIENT/OBSERVATION GOALS TO BE MET BEFORE DISCHARGE:  1. Ruled out acute coronary syndrome (negative or stable Troponin): Troponin negative x 2  2. Pain Status: Pain free.  3. Appropriate provocative testing performed: ECHO scheduled for tomorrow.   - Stress Test Procedure: Echo  - Interpretation of cardiac rhythm per telemetry tech: A-fib, HR in 60s.     4. Cleared by Consultants (if applicable):No, Cardiology consulted.   5. Return to near baseline physical activity: Yes  Discharge Planner Nurse   Safe discharge environment identified: Yes  Barriers to discharge: No       Entered by: Irena Mendez 07/05/2019    A&Ox4. VSS. Continues remote tele: A-fib w/ HR in 60s per tele tech. Up w/ SBA. Baseline numbness/tingling to BUE and LUE. Wound to bottom of left foot. Multiple toes amputated to R foot. Skin is bruised throughout. BS active x4, tolerating regular diet, passing flatus. BMx1. Voiding in adequate amt's. Will continue to monitor.   Please review provider order for any additional goals.   Nurse to notify provider when observation goals have been met and patient is ready for discharge.

## 2019-07-05 NOTE — PLAN OF CARE
ROOM # [id number]    Living Situation (if not independent, order SW consult): Independent at home w/ spouse  Facility name:  : [facility] [phone]    Activity level at baseline: Independent  Activity level on admit: SBA      Patient registered to observation; given Patient Bill of Rights; given the opportunity to ask questions about observation status and their plan of care.  Patient has been oriented to the observation room, bathroom and call light is in place.    Discussed discharge goals and expectations with patient/family.

## 2019-07-05 NOTE — Clinical Note
Stent deployed in the proximal right coronary artery. Max pressure = 14 luiz. Total duration = 30 seconds. Balloon reinflated a second time: Max pressure = 18 luiz. Total duration = 30 seconds.

## 2019-07-06 ENCOUNTER — APPOINTMENT (OUTPATIENT)
Dept: CARDIOLOGY | Facility: CLINIC | Age: 69
DRG: 247 | End: 2019-07-06
Attending: PHYSICIAN ASSISTANT
Payer: COMMERCIAL

## 2019-07-06 PROBLEM — I20.0 UNSTABLE ANGINA (H): Status: ACTIVE | Noted: 2019-07-06

## 2019-07-06 LAB
ERYTHROCYTE [DISTWIDTH] IN BLOOD BY AUTOMATED COUNT: 13.4 % (ref 10–15)
GLUCOSE BLDC GLUCOMTR-MCNC: 151 MG/DL (ref 70–99)
GLUCOSE BLDC GLUCOMTR-MCNC: 219 MG/DL (ref 70–99)
GLUCOSE BLDC GLUCOMTR-MCNC: 238 MG/DL (ref 70–99)
GLUCOSE BLDC GLUCOMTR-MCNC: 255 MG/DL (ref 70–99)
HBA1C MFR BLD: 8.8 % (ref 0–5.6)
HCT VFR BLD AUTO: 37 % (ref 40–53)
HGB BLD-MCNC: 12.1 G/DL (ref 13.3–17.7)
INTERPRETATION ECG - MUSE: NORMAL
MCH RBC QN AUTO: 28.6 PG (ref 26.5–33)
MCHC RBC AUTO-ENTMCNC: 32.7 G/DL (ref 31.5–36.5)
MCV RBC AUTO: 88 FL (ref 78–100)
PLATELET # BLD AUTO: 226 10E9/L (ref 150–450)
RBC # BLD AUTO: 4.23 10E12/L (ref 4.4–5.9)
WBC # BLD AUTO: 5.6 10E9/L (ref 4–11)

## 2019-07-06 PROCEDURE — 00000146 ZZHCL STATISTIC GLUCOSE BY METER IP

## 2019-07-06 PROCEDURE — 25000131 ZZH RX MED GY IP 250 OP 636 PS 637: Performed by: INTERNAL MEDICINE

## 2019-07-06 PROCEDURE — 96372 THER/PROPH/DIAG INJ SC/IM: CPT

## 2019-07-06 PROCEDURE — 36415 COLL VENOUS BLD VENIPUNCTURE: CPT | Performed by: INTERNAL MEDICINE

## 2019-07-06 PROCEDURE — 12000000 ZZH R&B MED SURG/OB

## 2019-07-06 PROCEDURE — 93306 TTE W/DOPPLER COMPLETE: CPT | Mod: 26 | Performed by: INTERNAL MEDICINE

## 2019-07-06 PROCEDURE — 83036 HEMOGLOBIN GLYCOSYLATED A1C: CPT | Performed by: INTERNAL MEDICINE

## 2019-07-06 PROCEDURE — 40000264 ECHOCARDIOGRAM COMPLETE

## 2019-07-06 PROCEDURE — 99222 1ST HOSP IP/OBS MODERATE 55: CPT | Performed by: INTERNAL MEDICINE

## 2019-07-06 PROCEDURE — 25000132 ZZH RX MED GY IP 250 OP 250 PS 637: Performed by: PHYSICIAN ASSISTANT

## 2019-07-06 PROCEDURE — G0378 HOSPITAL OBSERVATION PER HR: HCPCS

## 2019-07-06 PROCEDURE — 85027 COMPLETE CBC AUTOMATED: CPT | Performed by: INTERNAL MEDICINE

## 2019-07-06 PROCEDURE — 25000128 H RX IP 250 OP 636: Performed by: PHYSICIAN ASSISTANT

## 2019-07-06 PROCEDURE — 25500064 ZZH RX 255 OP 636: Performed by: INTERNAL MEDICINE

## 2019-07-06 PROCEDURE — 25000131 ZZH RX MED GY IP 250 OP 636 PS 637: Performed by: PHYSICIAN ASSISTANT

## 2019-07-06 RX ORDER — NALOXONE HYDROCHLORIDE 0.4 MG/ML
.1-.4 INJECTION, SOLUTION INTRAMUSCULAR; INTRAVENOUS; SUBCUTANEOUS
Status: DISCONTINUED | OUTPATIENT
Start: 2019-07-06 | End: 2019-07-08

## 2019-07-06 RX ORDER — LIDOCAINE 40 MG/G
CREAM TOPICAL
Status: DISCONTINUED | OUTPATIENT
Start: 2019-07-06 | End: 2019-07-09 | Stop reason: HOSPADM

## 2019-07-06 RX ORDER — ACETAMINOPHEN 325 MG/1
650 TABLET ORAL EVERY 4 HOURS PRN
Status: DISCONTINUED | OUTPATIENT
Start: 2019-07-06 | End: 2019-07-09 | Stop reason: HOSPADM

## 2019-07-06 RX ORDER — DEXTROSE MONOHYDRATE 25 G/50ML
25-50 INJECTION, SOLUTION INTRAVENOUS
Status: DISCONTINUED | OUTPATIENT
Start: 2019-07-06 | End: 2019-07-09 | Stop reason: HOSPADM

## 2019-07-06 RX ORDER — NICOTINE POLACRILEX 4 MG
15-30 LOZENGE BUCCAL
Status: DISCONTINUED | OUTPATIENT
Start: 2019-07-06 | End: 2019-07-09 | Stop reason: HOSPADM

## 2019-07-06 RX ORDER — ACETAMINOPHEN 650 MG/1
650 SUPPOSITORY RECTAL EVERY 4 HOURS PRN
Status: DISCONTINUED | OUTPATIENT
Start: 2019-07-06 | End: 2019-07-09 | Stop reason: HOSPADM

## 2019-07-06 RX ORDER — ALUMINA, MAGNESIA, AND SIMETHICONE 2400; 2400; 240 MG/30ML; MG/30ML; MG/30ML
30 SUSPENSION ORAL EVERY 4 HOURS PRN
Status: DISCONTINUED | OUTPATIENT
Start: 2019-07-06 | End: 2019-07-09 | Stop reason: HOSPADM

## 2019-07-06 RX ADMIN — ATORVASTATIN CALCIUM 40 MG: 40 TABLET, FILM COATED ORAL at 20:43

## 2019-07-06 RX ADMIN — CARVEDILOL 25 MG: 25 TABLET, FILM COATED ORAL at 08:39

## 2019-07-06 RX ADMIN — CARVEDILOL 25 MG: 25 TABLET, FILM COATED ORAL at 17:59

## 2019-07-06 RX ADMIN — INSULIN GLARGINE 36 UNITS: 100 INJECTION, SOLUTION SUBCUTANEOUS at 08:39

## 2019-07-06 RX ADMIN — POTASSIUM CHLORIDE 10 MEQ: 750 TABLET, FILM COATED, EXTENDED RELEASE ORAL at 08:39

## 2019-07-06 RX ADMIN — INSULIN ASPART 3 UNITS: 100 INJECTION, SOLUTION INTRAVENOUS; SUBCUTANEOUS at 17:55

## 2019-07-06 RX ADMIN — FUROSEMIDE 80 MG: 40 TABLET ORAL at 08:38

## 2019-07-06 RX ADMIN — HEPARIN SODIUM 1150 UNITS/HR: 10000 INJECTION, SOLUTION INTRAVENOUS at 20:45

## 2019-07-06 RX ADMIN — PANTOPRAZOLE SODIUM 40 MG: 40 TABLET, DELAYED RELEASE ORAL at 08:39

## 2019-07-06 RX ADMIN — LISINOPRIL 5 MG: 5 TABLET ORAL at 08:38

## 2019-07-06 RX ADMIN — APIXABAN 5 MG: 5 TABLET, FILM COATED ORAL at 08:39

## 2019-07-06 RX ADMIN — LEVOTHYROXINE SODIUM 137 MCG: 25 TABLET ORAL at 08:38

## 2019-07-06 RX ADMIN — ISOSORBIDE MONONITRATE 30 MG: 30 TABLET, EXTENDED RELEASE ORAL at 08:39

## 2019-07-06 RX ADMIN — ASPIRIN 81 MG: 81 TABLET, COATED ORAL at 08:39

## 2019-07-06 RX ADMIN — NITROGLYCERIN 0.4 MG: 0.4 TABLET SUBLINGUAL at 08:24

## 2019-07-06 RX ADMIN — HUMAN ALBUMIN MICROSPHERES AND PERFLUTREN 3 ML: 10; .22 INJECTION, SOLUTION INTRAVENOUS at 09:07

## 2019-07-06 RX ADMIN — POTASSIUM CHLORIDE 10 MEQ: 750 TABLET, FILM COATED, EXTENDED RELEASE ORAL at 20:43

## 2019-07-06 RX ADMIN — NITROGLYCERIN 0.4 MG: 0.4 TABLET SUBLINGUAL at 08:30

## 2019-07-06 ASSESSMENT — ACTIVITIES OF DAILY LIVING (ADL)
SWALLOWING: 0-->SWALLOWS FOODS/LIQUIDS WITHOUT DIFFICULTY
ADLS_ACUITY_SCORE: 12
RETIRED_COMMUNICATION: 0-->UNDERSTANDS/COMMUNICATES WITHOUT DIFFICULTY
ADLS_ACUITY_SCORE: 12
COGNITION: 0 - NO COGNITION ISSUES REPORTED
ADLS_ACUITY_SCORE: 13
FALL_HISTORY_WITHIN_LAST_SIX_MONTHS: NO
RETIRED_EATING: 0-->INDEPENDENT
TOILETING: 0-->INDEPENDENT
BATHING: 0-->INDEPENDENT
DRESS: 0-->INDEPENDENT

## 2019-07-06 NOTE — PLAN OF CARE
PRIMARY DIAGNOSIS: CHEST PAIN  OUTPATIENT/OBSERVATION GOALS TO BE MET BEFORE DISCHARGE:  1. Ruled out acute coronary syndrome (negative or stable Troponin):  No- one more serial troponin to be drawn. Trend  is negative thus far  2. Pain Status: Denies discomfort or pain   3. Appropriate provocative testing performed: No  - Stress Test Procedure: Regular in the morning of 07/06/2019  - Interpretation of cardiac rhythm per telemetry tech: Afib CVR    4. Cleared by Consultants (if applicable):No  5. Return to near baseline physical activity: No  Discharge Planner Nurse   Safe discharge environment identified: Yes  Barriers to discharge: No  Vitals are Temp: 95.4  F (35.2  C) Temp src: Oral BP: (!) 154/97 Pulse: 70 Heart Rate: 68 Resp: 16 SpO2: 97 %.  Patient is Alert and Oriented x4. They are independent with Activity.  Pt is a No caffeine and Mod CHO diet.  They are denying chest pain.  Patient is Saline locked. Echo in the morning. Patient independent in the room; denying pain. Type II DM; toe amputations to the right foot. Beta Blockers held Cariology Consulted. Continue POC.       Please review provider order for any additional goals.   Nurse to notify provider when observation goals have been met and patient is ready for discharge.

## 2019-07-06 NOTE — PROVIDER NOTIFICATION
07/06/19 0820   Vital Signs   Heart Rate 74   Pulse/Heart Rate Source Monitor   BP (!) 161/102   BP Location Left arm   ECG   ECG Rhythm Atrial fibrillation  (HR 70-80)        07/06/19 0820   Chest Pain Assessment   Chest Pain Location midsternal;arm, left   Rating (0-10) 5   Chest Pain Radiation arm;shoulder;upper back   Character aching;pressure   Precipitating Factors at rest   Associated Signs/Symptoms hypertension   Chest Pain Intervention cardiac monitoring continued;nitroglycerin SL given     Notified PA at 0820 AM regarding patient report of 5/10 chest pain.      Spoke with: Elaine Cohen PA-C    Orders were obtained.    Comments: Patient reported 5/10 (L) anterior/midsternal chest pain radiating to (L) shoulder and arm. Vitals & rhythm above. SL nitroglycerin given x 2 with decrease in pain to a 2/10. Per PA, no EKG necessary at this time. Patient continued to be hypertensive after nitroglycerin, PTA medications given and PA updated. Will continue to assess and treat as necessary.

## 2019-07-06 NOTE — H&P
[facility]    History and Physical - Hospitalist Service       Date of Admission:  7/5/2019    Assessment & Plan   Jayro Elder is a 69 year old male with a PMH significant for DM, CAD with LAD multiple LAD stents and OM3 stent, a fib on Eliquis, HTN, systolic CHF with EF 35-40%, PVD, hx of CVA, MARISOL and hx of tobacco use, who presented 7/5/2019 with exertional chest pain. Work up in ED unremarkable. Patient was initially admitted to observation and developed recurrent CP on morning of 7/6/2019. Cardiology was consulted. Due to concerns of evolving unstable angina and ACS, patient transferred to IP bed, started on IV heparin, with anticipation of angiogram on 7/82019.     1. Unstable angina: suspect cardiac source. Known hx of CAD with multiple repeat stents to LAD.   - transfer to IP [facility] bed  - Cardiology following, angiogram anticipated on 7/8/2019  - IV heparin, discontinue Eliquis  - continue home BB, ACEI, statin  - Echo results pending    2. DM -   - Continue PTA Lantus and sliding scale insulin  - Diabetic diet    3. HTN - resume PTA coreg, lisinopril (with parameters) and lasix  4. Systolic CHF - EF 35-40%. Denies increased LE edema, notes orthopnea since dx with pna 2 weeks ago, cough has improved/resolved. Does not appear volume overloaded. Resume home lasix  5. MARISOL - does not use CPAP  6. A fib - currently in a fib, rate controlled. On IV heparin for anticoagulation     Diet: Combination Diet 5645-9154 Calories: Moderate Consistent CHO (4-6 CHO units/meal); Low Saturated Fat Na <2400mg Diet, No Caffeine Diet    DVT Prophylaxis: Heparin  Taylor Catheter: not present  Code Status: Full Code      Disposition Plan   Expected discharge: 2 - 3 days, recommended to prior living arrangement once work-up complete and cleared by consultants.  Entered: Elaine Cohen PA-C 07/06/2019, 10:40 AM     The patient's care was discussed with the Patient.    Elaine Cohen PA-C  [facility]  Hospital    ______________________________________________________________________    Chief Complaint   Chest pain      History of Present Illness   Jayro Elder is a 69 year old male with a PMH significant for DM, CAD with LAD multiple LAD stents and OM3 stent, a fib on Eliquis, HTN, systolic CHF with EF 35-40%, PVD, hx of CVA, MARISOL and hx of tobacco use, who presented 7/5/2019 with exertional chest pain. Work up in ED unremarkable. Patient was initially admitted to observation and developed recurrent CP on morning of 7/6/2019. Cardiology was consulted. Due to concerns of evolving unstable angina and ACS, patient transferred to IP bed, started on IV heparin, with anticipation of angiogram on 7/82019.    Review of Systems    The 10 point Review of Systems is negative other than noted in the HPI or here.    Past Medical History    I have reviewed this patient's medical history and updated it with pertinent information if needed.   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     Cardiomyopathy (H)      Cellulitis of left lower extremity 3/13/2018     Cerebral infarction (H)     eye sight decreased in peripheral of right side and blurry     Diabetic ulcer of left midfoot associated with type 2 diabetes mellitus, with fat layer exposed (H) 3/13/2018     Essential hypertension, benign 11/13/2002     Generalized osteoarthrosis, unspecified site 11/13/2002     Microalbuminuria 3/13/2018    X1     Myocardial infarction (H)      Neuropathy      Sepsis (H)      Sleep apnea     doesn't use it     Substance abuse (H)      Syncope, unspecified syncope type 3/13/2018     Thyroid disease     takes medicine     Tobacco use disorder 11/13/2002     Type 2 diabetes mellitus with diabetic peripheral angiopathy without gangrene, unspecified long term insulin use status 2005       Past Surgical  History   I have reviewed this patient's surgical history and updated it with pertinent information if needed.  Past Surgical History:   Procedure Laterality Date     AMPUTATE TOE(S) Right 1/6/2019    Procedure: Right open second toe partial amputation;  Surgeon: Sabino Garcia DPM;  Location: [facility] OR     AMPUTATE TOE(S) Right 1/8/2019    Procedure: 1.  Revision right second toe amputation with resection of distal half of proximal phalanx with full closure.    2.  Debridement of callus/preulcerative lesion, distal left second toe and plantar left first metatarsophalangeal joint.;  Surgeon: Ryan Bhagat DPM;  Location: [facility] OR     AMPUTATE TOE(S) Right 3/12/2019    Procedure: Partial right fourth toe amputation.;  Surgeon: Ryan Bhagat DPM;  Location: [facility] OR     AMPUTATE TOE(S) Right 5/6/2019    Procedure: Right partial third toe amputation;  Surgeon: Татьяна Mckeon DPM, Podiatry/Foot and Ankle Surgery;  Location: [facility] OR     ANGIOGRAM  6/29/05    subtotal occ.mid LAD,SUSAN mid LAD     ANGIOGRAM  7/05    mild CAD,patent stent,no flow-limiting lesions,sev.LV dysf.LAD enlarged     ANGIOGRAM  2/09    Sev.single vessel disease,Mod LV dysf.distal LAD 90%,70-75% mid lad just before prev stent,SUSAN to prox.mid LAD, endeavor to distal LAD     ANGIOGRAM  11/13/13    restenosis, stent LAD     BACK SURGERY      back surgery x3     C NONSPECIFIC PROCEDURE      Laminectomy x 3 - (1983 x 2 & 1990)     C NONSPECIFIC PROCEDURE Bilateral 1998    Bunionectomy     C NONSPECIFIC PROCEDURE  1959    Gingival surgery at age 9     HEART CATH LEFT HEART CATH  06/13/2017    SUSAN to OM3     INCISION AND DRAINAGE TOE, COMBINED Bilateral 9/11/2018    Procedure: COMBINED INCISION AND DRAINAGE TOE;  1) Irrigation and debridement ulcer left great toe  2) Amputation 3rd toe right foot at distal interphalangeal joint level;  Surgeon: Miki Harp MD;  Location: [facility] OR     IRRIGATION AND DEBRIDEMENT FOOT, COMBINED Left 3/12/2019     Procedure: Debridement of left foot ulcer sub first MPJ 2 x 2.5 cm down to and including subcutaneous tissue, callus debridement for preulcerative lesion, left second toe.;  Surgeon: Ryan Bhagat DPM;  Location: [facility] OR     ORTHOPEDIC SURGERY      bunion surgery both feet     ORTHOPEDIC SURGERY      left shoulder     SOFT TISSUE SURGERY      debridement of toe numerous time     VASCULAR SURGERY      7 stents in heart       Social History   I have reviewed this patient's social history and updated it with pertinent information if needed.  Social History     Tobacco Use     Smoking status: Former Smoker     Packs/day: 1.00     Years: 25.00     Pack years: 25.00     Types: Cigarettes     Last attempt to quit: 2005     Years since quittin.9     Smokeless tobacco: Never Used   Substance Use Topics     Alcohol use: No     Alcohol/week: 2.4 oz     Types: 4 Shots of liquor per week     Frequency: Never     Comment: almost every day     Drug use: No       Family History   I have reviewed this patient's family history and updated it with pertinent information if needed.   Family History   Problem Relation Age of Onset     Cancer - colorectal Sister      Thyroid Disease Brother      Cardiovascular Brother      Diabetes Brother      Cancer Father         tumor in chest and throat     Arthritis Mother      Thyroid Disease Mother      Diabetes Mother      Glaucoma No family hx of      Macular Degeneration No family hx of        Prior to Admission Medications   Prior to Admission Medications   Prescriptions Last Dose Informant Patient Reported? Taking?   Cholecalciferol (VITAMIN D3) 2000 units TABS 2019 at am  Yes Yes   Sig: Take 1 tablet by mouth daily   NONFORMULARY Past Week at does not have on now  Yes Yes   Sig: Topical patch for diabetes (blood glucose control) - changes every 4 days or so   apixaban ANTICOAGULANT (ELIQUIS) 5 MG tablet 2019 at am  No Yes   Sig: Take 1 tablet (5 mg) by mouth 2 times  daily   aspirin 81 MG EC tablet 7/5/2019 at am  Yes Yes   Sig: Take 81 mg by mouth daily   atorvastatin (LIPITOR) 40 MG tablet 7/4/2019 at pm  No Yes   Sig: Take 1 tablet (40 mg) by mouth every evening   carvedilol (COREG) 25 MG tablet 7/5/2019 at am  No Yes   Sig: Take 1 tablet (25 mg) by mouth 2 times daily (with meals)   furosemide (LASIX) 80 MG tablet 7/4/2019 at am  No Yes   Sig: Take 1 tablet (80 mg) by mouth daily   insulin aspart (NOVOLOG FLEXPEN) 100 UNIT/ML pen 7/4/2019 at Unknown time  Yes Yes   Sig: Inject Subcutaneous 3 times daily (with meals) Sliding Scale  Do not give sliding scale insulin if Pre-Meal BG less than 140.   For Pre-Meal:   - 200 give 2 units.    - 250 give 4 units.    - 300 give 6 units.    - 350 give 8 units.    - 400 give 10 units.   insulin aspart (NOVOLOG FLEXPEN) 100 UNIT/ML pen 7/4/2019 at noon  Yes Yes   Sig: Inject 8 Units Subcutaneous daily (with lunch)   insulin aspart (NOVOLOG FLEXPEN) 100 UNIT/ML pen 7/4/2019 at Unknown time  Yes Yes   Sig: Inject 10 Units Subcutaneous daily (with dinner)   insulin aspart (NOVOLOG PEN) 100 UNIT/ML pen 7/5/2019 at only 4 units  Yes Yes   Sig: Inject 8 Units Subcutaneous daily (with breakfast)    insulin glargine (LANTUS SOLOSTAR PEN) 100 UNIT/ML pen 7/5/2019 at am  Yes Yes   Sig: Inject 36 Units Subcutaneous every morning   insulin pen needle 31G X 6 MM Unknown at Unknown time Self No No   Sig: Use  as directed.   isosorbide mononitrate (IMDUR) 30 MG 24 hr tablet 7/5/2019 at am  No Yes   Sig: Take 1 tablet (30 mg) by mouth daily   levothyroxine (SYNTHROID, LEVOTHROID) 137 MCG tablet 7/5/2019 at am Self Yes Yes   Sig: Take 137 mcg by mouth daily    lisinopril (PRINIVIL/ZESTRIL) 5 MG tablet 7/5/2019 at am  No Yes   Sig: Take 1 tablet (5 mg) by mouth daily   nitroglycerin (NITROSTAT) 0.4 MG sublingual tablet Unknown at Unknown time Self No No   Sig: Place 1 tablet (0.4 mg) under the tongue every 5 minutes as needed  for chest pain   order for DME Unknown at Unknown time Self No No   Sig: Equipment being ordered: Walker Wheels () and Walker ()  Treatment Diagnosis: Impaired gait, heel WB bilaterally.   order for DME Unknown at Unknown time  No No   Sig: Equipment being ordered: Other: surgical shoe male XL  Treatment Diagnosis: sp right 2nd toe amputaion   pantoprazole (PROTONIX) 40 MG enteric coated tablet 7/5/2019 at am Self No Yes   Sig: Take 1 tablet (40 mg) by mouth every morning (before breakfast)   potassium chloride ER (K-DUR/KLOR-CON M) 10 MEQ CR tablet 7/5/2019 at am  No Yes   Sig: Take 1 tablet (10 mEq) by mouth 2 times daily      Facility-Administered Medications: None     Allergies   Allergies   Allergen Reactions     No Known Allergies        Physical Exam   Vital Signs: Temp: 96.9  F (36.1  C) Temp src: Oral BP: (!) 171/92 Pulse: 70 Heart Rate: 79 Resp: 16 SpO2: 97 % O2 Device: None (Room air)    Weight: 244 lbs 1.6 oz    GENERAL: overweight, no acute distress  PSYCH: pleasant, cooperative  EYES: no discharge, no injection  HENT:  Normocephalic. Moist mucus membranes.  NECK:  Supple, symmetric  LUNGS:  Clear to auscultation bilaterally without rhonchi, rales, or wheeze. Chest rise symmetric and no tenderness to palpation.  HEART:  Regular rate & rhythm. No murmur, gallop, or rub.  EXTREMITIES:  No gross deformities, moves all 4 limbs spontaneously, sitting comfortably in chair  SKIN:  Warm and dry, no rash or suspicious lesions    NEUROLOGIC: alert, sensation grossly intact.    Data   Data reviewed today: I reviewed all medications, new labs and imaging results over the last 24 hours. I personally reviewed no images or EKG's today.    Recent Labs   Lab 07/06/19  1015 07/05/19  2051 07/05/19  1702 07/05/19  1301   WBC 5.6  --   --  6.8   HGB 12.1*  --   --  12.5*   MCV 88  --   --  88     --   --  246   NA  --   --   --  135   POTASSIUM  --   --   --  4.4   CHLORIDE  --   --   --  104   CO2  --    --   --  24   BUN  --   --   --  17   CR  --   --   --  0.99   ANIONGAP  --   --   --  7   BETTIE  --   --   --  8.8   GLC  --   --   --  240*   TROPI  --  <0.015 <0.015 <0.015     Recent Results (from the past 24 hour(s))   XR Chest 2 Views    Narrative    XR CHEST 2 VW 7/5/2019 1:44 PM    HISTORY: Pain.    COMPARISON: June 21, 2019.       Impression    IMPRESSION: The lungs are clear. No focal pulmonary opacities. No  pleural effusions or pneumothorax. Heart size is upper limits of  normal as before.     MAHI COLE MD   CT Head w/o Contrast    Narrative    CT SCAN OF THE HEAD WITHOUT CONTRAST   7/5/2019 1:53 PM     HISTORY: L>R arm wakness    TECHNIQUE:  Axial images of the head and coronal reformations without  IV contrast material. Radiation dose for this scan was reduced using  automated exposure control, adjustment of the mA and/or kV according  to patient size, or iterative reconstruction technique.    COMPARISON: Scan dated 9/6/2018    FINDINGS:     Intracranial contents: There is diffuse parenchymal volume loss.   White matter changes are present in the cerebral hemispheres that are  consistent with small vessel ischemic disease in this age patient.  There is a chronic area of encephalomalacia in the left occipital  region. This is unchanged. There is no evidence of intracranial  hemorrhage, mass, acute infarct or anomaly.    Visualized orbits/sinuses/mastoids:  The visualized portions of the  sinuses and mastoids appear normal.    Osseous structures/soft tissues:  There is no evidence of trauma.      Impression    IMPRESSION:   1. No acute pathology is identified. No bleed, mass, or acute infarcts  are seen.  2. Chronic encephalomalacia in the left occipital lobe.      REINA PAEZ MD

## 2019-07-06 NOTE — PLAN OF CARE
SBP elevated 150-170's on RA. Tele: Afib CVR, BBB. A/O. Independent. Pt c/o 2-3/10 Chest pain and pressure, declines medication for pain.  and 255 Insulin sliding scale not yet ordered, MD notified. Mod Carb/Cardiac diet. Plan to start Hep gtt this evening, and Angio Monday. Will continue to monitor and POC.

## 2019-07-06 NOTE — PROVIDER NOTIFICATION
"Patient had 2.4 second pause per tele tech, PA notified. Denies chest pain and SOB.     BP (!) 157/99 (BP Location: Right arm)   Pulse 70   Temp 96.5  F (35.8  C) (Oral)   Resp 14   Ht 1.93 m (6' 4\")   Wt 110.7 kg (244 lb 1.6 oz)   SpO2 97%   BMI 29.71 kg/m      "

## 2019-07-06 NOTE — PLAN OF CARE
"Vitals: /92 (PA aware and updated)  Pulse 70  Temp 96.9  F  Resp 16  SpO2 97%      Neuro: WNL  Cardiac: See separate note about chest pain episode. Tele = afib CVR, HR 80  Lungs: WNL; infrequent, strong cough noted  GI: WNL; + flatus, BS active x 4  : WNL   Pain: Denies ex chest/shoulder/back - see separate note  Skin: R toe amputation sites healed. Ulcer on L foot w/ eschar, scabbing. Patient refused mepilex; states \"all the bandages just fall off\"  IV: SL  Labs/Imaging: Echo completed, results pending. Trop neg x 3. CXR neg. Head CT neg.   Diet: Mod carb, cardiac, no caffeine  Activity: SBA  Misc: Patient reports mx family stressors recently.   Plan: Will transfer to 3rd floor. Report called to RN. Per cardiologist, will hold elequis and start a heparin drip in preparation for cath lab on Monday.     Patient transferred in stable condition; reported 2/10 (L) chest/jaw/shoulder pain before transfer.      "

## 2019-07-06 NOTE — PLAN OF CARE
PRIMARY DIAGNOSIS: CHEST PAIN  OUTPATIENT/OBSERVATION GOALS TO BE MET BEFORE DISCHARGE:  1. Ruled out acute coronary syndrome (negative or stable Troponin):  No- one more serial troponin to be drawn. Trend  is negative thus far  2. Pain Status: Denies discomfort or pain   3. Appropriate provocative testing performed: No  - Stress Test Procedure: Regular in the morning of 07/06/2019  - Interpretation of cardiac rhythm per telemetry tech: Afib CVR     4. Cleared by Consultants (if applicable):No  5. Return to near baseline physical activity: No  Discharge Planner Nurse   Safe discharge environment identified: Yes  Barriers to discharge: No  Vitals are Temp: 95.7  F (35.4  C) Temp src: Oral BP: 156/84 Pulse: 70 Heart Rate: 69 Resp: 16 SpO2: 97 %.    Patient is Alert and Oriented x4. They are independent with Activity.  Pt is a No caffeine and Mod CHO diet.  They are denying chest pain.  Patient is Saline locked. Echo in the morning. Patient independent in the room; denying pain. Type II DM; toe amputations to the right foot. Beta Blockers held Cariology Consulted. Continue POC.     Please review provider order for any additional goals.   Nurse to notify provider when observation goals have been met and patient is ready for discharge.

## 2019-07-06 NOTE — CONSULTS
CTS identifies pt as high risk due to Elevated ANDRIA. Patient admitted on 7/5/19 with chest pain, cardiology consulted and following. Plan is for a coronary angiography. Patient has a history of diabetes on insulin, Afib on Eliquis, cardiomyopathy, HTN, and hypothyroidism. Patient follows with podiatry and cardiology [facility] outpatient specialty. Patient has had 3 hospitalizations within the last 6 months, in January for CHF exacerbation, in March for cellulitis and osteomyelitis, and in May for cellulitis.   Patient is currently not established with primary care. Patient did not want any resources at this time. Patient states that transportation is difficult as he relies on his wife for transportation for appointments and shopping. Patient was resistant to considering other transportation options. Patient does not have any services currently and is not interested in home care.   AVS discharge instructions were updated for the pt to schedule an appointment to establish care in a primary care clinic.     Handoff will be given to care coordinator in cardiology clinic.      CM will continue to follow patient for any additional discharge needs.     Miracle CALI  Care Transition Services  [phone]

## 2019-07-07 LAB
APTT PPP: 40 SEC (ref 22–37)
APTT PPP: 53 SEC (ref 22–37)
GLUCOSE BLDC GLUCOMTR-MCNC: 127 MG/DL (ref 70–99)
GLUCOSE BLDC GLUCOMTR-MCNC: 151 MG/DL (ref 70–99)
GLUCOSE BLDC GLUCOMTR-MCNC: 180 MG/DL (ref 70–99)
GLUCOSE BLDC GLUCOMTR-MCNC: 187 MG/DL (ref 70–99)
GLUCOSE BLDC GLUCOMTR-MCNC: 194 MG/DL (ref 70–99)
LMWH PPP CHRO-ACNC: 1.84 IU/ML
LMWH PPP CHRO-ACNC: >2 IU/ML

## 2019-07-07 PROCEDURE — 25000128 H RX IP 250 OP 636: Performed by: HOSPITALIST

## 2019-07-07 PROCEDURE — 36415 COLL VENOUS BLD VENIPUNCTURE: CPT | Performed by: HOSPITALIST

## 2019-07-07 PROCEDURE — 36415 COLL VENOUS BLD VENIPUNCTURE: CPT | Performed by: PHYSICIAN ASSISTANT

## 2019-07-07 PROCEDURE — 12000000 ZZH R&B MED SURG/OB

## 2019-07-07 PROCEDURE — 99233 SBSQ HOSP IP/OBS HIGH 50: CPT | Performed by: INTERNAL MEDICINE

## 2019-07-07 PROCEDURE — 85730 THROMBOPLASTIN TIME PARTIAL: CPT | Performed by: PHYSICIAN ASSISTANT

## 2019-07-07 PROCEDURE — 85520 HEPARIN ASSAY: CPT | Performed by: HOSPITALIST

## 2019-07-07 PROCEDURE — 25000131 ZZH RX MED GY IP 250 OP 636 PS 637: Performed by: PHYSICIAN ASSISTANT

## 2019-07-07 PROCEDURE — 85520 HEPARIN ASSAY: CPT | Performed by: PHYSICIAN ASSISTANT

## 2019-07-07 PROCEDURE — 85730 THROMBOPLASTIN TIME PARTIAL: CPT | Performed by: HOSPITALIST

## 2019-07-07 PROCEDURE — 25000132 ZZH RX MED GY IP 250 OP 250 PS 637: Performed by: PHYSICIAN ASSISTANT

## 2019-07-07 PROCEDURE — 99232 SBSQ HOSP IP/OBS MODERATE 35: CPT | Performed by: INTERNAL MEDICINE

## 2019-07-07 PROCEDURE — 00000146 ZZHCL STATISTIC GLUCOSE BY METER IP

## 2019-07-07 RX ADMIN — LEVOTHYROXINE SODIUM 137 MCG: 25 TABLET ORAL at 07:51

## 2019-07-07 RX ADMIN — FUROSEMIDE 80 MG: 40 TABLET ORAL at 09:28

## 2019-07-07 RX ADMIN — LISINOPRIL 5 MG: 5 TABLET ORAL at 07:52

## 2019-07-07 RX ADMIN — POTASSIUM CHLORIDE 10 MEQ: 750 TABLET, FILM COATED, EXTENDED RELEASE ORAL at 07:52

## 2019-07-07 RX ADMIN — CARVEDILOL 25 MG: 25 TABLET, FILM COATED ORAL at 18:09

## 2019-07-07 RX ADMIN — HEPARIN SODIUM 1000 UNITS/HR: 10000 INJECTION, SOLUTION INTRAVENOUS at 05:11

## 2019-07-07 RX ADMIN — CARVEDILOL 25 MG: 25 TABLET, FILM COATED ORAL at 07:52

## 2019-07-07 RX ADMIN — POTASSIUM CHLORIDE 10 MEQ: 750 TABLET, FILM COATED, EXTENDED RELEASE ORAL at 21:09

## 2019-07-07 RX ADMIN — HEPARIN SODIUM 1350 UNITS/HR: 10000 INJECTION, SOLUTION INTRAVENOUS at 15:38

## 2019-07-07 RX ADMIN — ACETAMINOPHEN 650 MG: 325 TABLET, FILM COATED ORAL at 11:54

## 2019-07-07 RX ADMIN — ASPIRIN 81 MG: 81 TABLET, COATED ORAL at 07:52

## 2019-07-07 RX ADMIN — INSULIN GLARGINE 36 UNITS: 100 INJECTION, SOLUTION SUBCUTANEOUS at 07:55

## 2019-07-07 RX ADMIN — PANTOPRAZOLE SODIUM 40 MG: 40 TABLET, DELAYED RELEASE ORAL at 06:24

## 2019-07-07 RX ADMIN — INSULIN ASPART 1 UNITS: 100 INJECTION, SOLUTION INTRAVENOUS; SUBCUTANEOUS at 07:38

## 2019-07-07 RX ADMIN — ATORVASTATIN CALCIUM 40 MG: 40 TABLET, FILM COATED ORAL at 21:08

## 2019-07-07 RX ADMIN — ISOSORBIDE MONONITRATE 30 MG: 30 TABLET, EXTENDED RELEASE ORAL at 07:52

## 2019-07-07 RX ADMIN — INSULIN ASPART 2 UNITS: 100 INJECTION, SOLUTION INTRAVENOUS; SUBCUTANEOUS at 12:15

## 2019-07-07 ASSESSMENT — ACTIVITIES OF DAILY LIVING (ADL)
ADLS_ACUITY_SCORE: 12

## 2019-07-07 ASSESSMENT — MIFFLIN-ST. JEOR: SCORE: 1934.27

## 2019-07-07 NOTE — PROGRESS NOTES
"Cardiology Progress Note          Assessment and Plan:         69-year-old gentleman with an ischemic cardiomyopathy, status post PCI to LAD and most recently to the obtuse marginal in 2017, chronic persistent atrial fibrillation, history of CVA, and poorly controlled diabetes who presented to [facility] with complaints of exertional chest discomfort over the past 3 to 4 days.        1. Exertional chest discomfort over the past 3 to 4 days with associated nausea/diaphoresis/shortness of breath.  The clinical history is highly suggestive of accelerating angina.  2. Coronary artery disease status post multiple previous percutaneous interventions to the LAD and most recently stenting of the third obtuse marginal in 2017  3. Ischemic cardiomyopathy with an LVEF of 35 to 40% on an echocardiogram from 2019  4. Chronic persistent atrial fibrillation  5. History of CVA in the past  6. Poorly controlled diabetes  7. HTN    PLAN  - coronary angiography tomorrow  - continue heparin, other cardiac meds            Interval History:     No chest discomfort this AM.              Review of Systems:   As per subjective, otherwise 5 systems reviewed and negative.           Physical Exam:   Blood pressure 140/82, pulse 81, temperature 96.1  F (35.6  C), temperature source Oral, resp. rate 16, height 1.93 m (6' 4\"), weight 106.8 kg (235 lb 6.4 oz), SpO2 97 %.      Vital Sign Ranges  Temperature Temp  Av.3  F (35.7  C)  Min: 95.7  F (35.4  C)  Max: 98  F (36.7  C)   Blood pressure Systolic (24hrs), Av , Min:140 , Max:171        Diastolic (24hrs), Av, Min:74, Max:97      Pulse Pulse  Av  Min: 81  Max: 81   Respirations Resp  Av.3  Min: 16  Max: 18   Pulse oximetry SpO2  Av.4 %  Min: 96 %  Max: 99 %         Intake/Output Summary (Last 24 hours) at 2019 0955  Last data filed at 2019 0800  Gross per 24 hour   Intake 13 ml   Output 775 ml   Net -762 ml "       Constitutional: NAD  Skin: Warm and dry  Neck: No JVD  Lungs: CTA  Cardiovascular: irregular  Abdomen: Soft, non tender.  Extremities and Back: +1 LE edema  Neurological: Nonfocal           Medications:     I have reviewed this patient's current medications.              Data:     Labs reviewed.     Tele: BUZZ Mccarthy M.D.

## 2019-07-07 NOTE — PLAN OF CARE
Pt A/O x 4. VSS on RA with elevated SBP's in 150's. C/O intermittent bilat lateral abd pain when laying flat, relieved when pt sits at edge of bed. . Tele afib CVR , denied CP . LS clear, no SOB reported. Critical lab was called, hepXa at >2.00, charge RN notified by pharmacy who notified writer. Hep gtt at 11.5 ml/hr was immediately stopped per pharm order by writer, and later rate adjusted to 10 ml/hr per pharm orders. Pharm called and informed writer that d/t meds pt received 7/6 they interfered with hepXa resulting in potentially skewed results; recheck ordered and gtt rate now at 13.5 ml/hr, infusing in PIV to right FA . Up to bathroom SBA,  ml. Pt was frustrated when writer set bed alarms and placed SBA  but was educated on safety and risk for hemorrhaging r/t falls with hep gtt. Planned angio 7/8 with possible discharge 2-3 days to prior living. Continue POC.

## 2019-07-07 NOTE — PLAN OF CARE
A&O. LS clear. Hep gtt started at 11.5 mL/hr this shift. , 1 unit sliding scale insulin given. Up independently in room. Scab/callus removal to L foot TELMA, R foot with recent amputation to 3 toes. Cardiology following.

## 2019-07-07 NOTE — PROGRESS NOTES
[facility]    Medicine Progress Note - Hospitalist Service       Date of Admission:  7/5/2019  Date of Service: 07/07/2019    Assessment & Plan     Jayro Elder is a 69 year old male with a PMH significant for DM, CAD with multiple stents, a fib on Eliquis, HTN, systolic CHF with EF 35-40%, PVD, hx of CVA, MARISOL and hx of tobacco use, who presented 7/5/2019 with exertional chest pain. Work up in ED unremarkable. Patient was initially admitted to observation and developed recurrent CP on morning of 7/6/2019. Cardiology was consulted. Due to concerns of evolving unstable angina and ACS, patient transferred to inpatient status, started on IV heparin, with anticipation of angiogram on 7/82019.     1. Concern for Unstable angina: Extensive history of CAD, some of the symptoms are atypical although multiple risk factors including IDDM   - Cardiology following, angiogram anticipated on 7/8/2019  - IV heparin, Hold Eliquis for now   - continue asa, home BB, ACEI, statin  - ECHO similar to before   - Elevated Xa level due to interference from Eliquis, pharmacy checking PTT and managing heparin drip     2. IDDM -   - Continue PTA Lantus and sliding scale insulin  - Diabetic diet  - Add Novolog with meals 8 units TID as he takes it at home     3. HTN - resume PTA coreg, lisinopril (with parameters) and lasix  4. Ischemic cardiomyopathy, chronic Systolic CHF,  - EF 35-40%. Denies increased LE edema, notes orthopnea since dx with pna 2 weeks ago, cough has improved/resolved. Does not appear volume overloaded. Resume home lasix  5. MARISOL - does not use CPAP  6. Chronic A fib - currently in a fib, rate controlled. On IV heparin for anticoagulation     Diet: Combination Diet [id number] Calories: Moderate Consistent CHO (4-6 CHO units/meal); Low Saturated Fat Na <2400mg Diet, No Caffeine Diet    DVT Prophylaxis: heparin gtt   Taylor Catheter: not present  Code Status: Full Code      Disposition Plan   Expected discharge:  1-2 days, recommended to prior living arrangement once angiogram completed.  Entered: Lilibeth Pereira MD 07/07/2019, 2:22 PM       The patient's care was discussed with the Bedside Nurse and Patient.    Lilibeth Pereira MD  Hospitalist Service  [facility]    ______________________________________________________________________    Interval History   Chart reviewed and patient seen. Case discussed with nursing staff.     Patient feels well, no recurrence of chest pain since yesterday, no shortness of breath. No reports of abdominal pain or vomiting. No new complaints voiced otherwise.       Data reviewed today: I reviewed all medications, new labs and imaging results over the last 24 hours. I personally reviewed    Physical Exam   Vital Signs: Temp: 96.1  F (35.6  C) Temp src: Oral BP: 140/82 Pulse: 81 Heart Rate: 73 Resp: 16 SpO2: 97 % O2 Device: None (Room air)    Weight: 235 lbs 6.4 oz    GENERAL:  Awake and alert, No acute distress.  PSYCH: appropriate affect, no acute agitation   HEENT:  Neck is Supple, trachea is midline, EOMI, conjunctiva clear  CARDIOVASCULAR: Regular rate and rhythm, Normal S1, S2, no loud murmurs, no rubs or gallops.   PULMONARY:  Clear to auscultation bilaterally. Good air entry on both sides  GI: Abdomen is soft, non tender, non-distended, bowel sounds present. No rebound or guarding   SKIN:  No cyanosis or clubbing, no obvious exanthems on exposed areas   MSK: Extremities are warm and well perfused. No pitting edema   Neuro: Awake and oriented x 3. Moving all extremities with good strength     Data   Recent Labs   Lab 07/06/19  1015 07/05/19  2051 07/05/19  1702 07/05/19  1301   WBC 5.6  --   --  6.8   HGB 12.1*  --   --  12.5*   MCV 88  --   --  88     --   --  246   NA  --   --   --  135   POTASSIUM  --   --   --  4.4   CHLORIDE  --   --   --  104   CO2  --   --   --  24   BUN  --   --   --  17   CR  --   --   --  0.99   ANIONGAP  --   --   --  7   BETTIE  --    --   --  8.8   GLC  --   --   --  240*   TROPI  --  <0.015 <0.015 <0.015       No results found for this or any previous visit (from the past 24 hour(s)).  Medications     Continuing ACE inhibitor/ARB/ARNI from home medication list OR ACE inhibitor/ARB order already placed during this visit       - MEDICATION INSTRUCTIONS -       - MEDICATION INSTRUCTIONS -       - MEDICATION INSTRUCTIONS -       HEParin 1,350 Units/hr (07/07/19 0822)     - MEDICATION INSTRUCTIONS -       - MEDICATION INSTRUCTIONS -         aspirin  81 mg Oral Daily     atorvastatin  40 mg Oral QPM     carvedilol  25 mg Oral BID w/meals     furosemide  80 mg Oral Daily     insulin aspart  8 Units Subcutaneous TID w/meals     insulin aspart  1-7 Units Subcutaneous TID AC     insulin aspart  1-5 Units Subcutaneous At Bedtime     insulin glargine  36 Units Subcutaneous QAM     isosorbide mononitrate  30 mg Oral Daily     levothyroxine  137 mcg Oral Daily     lisinopril  5 mg Oral Daily     pantoprazole  40 mg Oral QAM AC     potassium chloride ER  10 mEq Oral BID     sodium chloride (PF)  3 mL Intracatheter Q8H

## 2019-07-07 NOTE — PLAN OF CARE
PT: Received orders for cardiac evaluation and treatment.  Patient scheduled for angiogram potentially tomorrow, 7/8 per nursing and EMR.  Per discussion with nursing, patient is independent with mobility within room, recommends holding cardiac rehab evaluation and education until after patient has had angio procedure.  Will reschedule.

## 2019-07-07 NOTE — PLAN OF CARE
VSS on RA. A/O. Tele: Afib CVR, BBB. Pt c/o 3/10 chest pain, Tylenol given, see MAR with relief. Hep gtt @ 13.5u/hr. SBA. Using urinal at bedside. Plan for Angio tomorrow.

## 2019-07-07 NOTE — PROVIDER NOTIFICATION
07/07/19 0347   Significant Event   Significant Event Other (see comments)  (Heparin Xa level > 2.0)   Lab called with the above critical level. Level reported to bedside RN, Agatha Del Rosario, by this RN. Called & discussed w/PharmD, Makr, with orders received.  Sarai Aguayo, BSN, RN  Medical/Telemetry - 3

## 2019-07-08 ENCOUNTER — SURGERY (OUTPATIENT)
Age: 69
End: 2019-07-08
Payer: COMMERCIAL

## 2019-07-08 LAB
ANION GAP SERPL CALCULATED.3IONS-SCNC: 7 MMOL/L (ref 3–14)
APTT PPP: 62 SEC (ref 22–37)
BUN SERPL-MCNC: 20 MG/DL (ref 7–30)
CALCIUM SERPL-MCNC: 9.2 MG/DL (ref 8.5–10.1)
CHLORIDE SERPL-SCNC: 102 MMOL/L (ref 94–109)
CHOLEST SERPL-MCNC: 106 MG/DL
CO2 SERPL-SCNC: 29 MMOL/L (ref 20–32)
CREAT SERPL-MCNC: 1.14 MG/DL (ref 0.66–1.25)
GFR SERPL CREATININE-BSD FRML MDRD: 65 ML/MIN/{1.73_M2}
GLUCOSE BLDC GLUCOMTR-MCNC: 102 MG/DL (ref 70–99)
GLUCOSE BLDC GLUCOMTR-MCNC: 113 MG/DL (ref 70–99)
GLUCOSE BLDC GLUCOMTR-MCNC: 144 MG/DL (ref 70–99)
GLUCOSE BLDC GLUCOMTR-MCNC: 167 MG/DL (ref 70–99)
GLUCOSE SERPL-MCNC: 137 MG/DL (ref 70–99)
HDLC SERPL-MCNC: 46 MG/DL
KCT BLD-ACNC: 138 SEC (ref 75–150)
KCT BLD-ACNC: 251 SEC (ref 75–150)
KCT BLD-ACNC: 255 SEC (ref 75–150)
KCT BLD-ACNC: 296 SEC (ref 75–150)
LDLC SERPL CALC-MCNC: 43 MG/DL
LMWH PPP CHRO-ACNC: 1.31 IU/ML
MRSA DNA SPEC QL NAA+PROBE: NEGATIVE
NONHDLC SERPL-MCNC: 60 MG/DL
POTASSIUM SERPL-SCNC: 3.8 MMOL/L (ref 3.4–5.3)
SODIUM SERPL-SCNC: 138 MMOL/L (ref 133–144)
SPECIMEN SOURCE: NORMAL
TRIGL SERPL-MCNC: 84 MG/DL

## 2019-07-08 PROCEDURE — 25800030 ZZH RX IP 258 OP 636: Performed by: INTERNAL MEDICINE

## 2019-07-08 PROCEDURE — 36415 COLL VENOUS BLD VENIPUNCTURE: CPT | Performed by: HOSPITALIST

## 2019-07-08 PROCEDURE — 85520 HEPARIN ASSAY: CPT | Performed by: HOSPITALIST

## 2019-07-08 PROCEDURE — 02C03ZZ EXTIRPATION OF MATTER FROM CORONARY ARTERY, ONE ARTERY, PERCUTANEOUS APPROACH: ICD-10-PCS | Performed by: INTERNAL MEDICINE

## 2019-07-08 PROCEDURE — 4A023N7 MEASUREMENT OF CARDIAC SAMPLING AND PRESSURE, LEFT HEART, PERCUTANEOUS APPROACH: ICD-10-PCS | Performed by: INTERNAL MEDICINE

## 2019-07-08 PROCEDURE — 25000125 ZZHC RX 250: Performed by: INTERNAL MEDICINE

## 2019-07-08 PROCEDURE — 87641 MR-STAPH DNA AMP PROBE: CPT | Performed by: INTERNAL MEDICINE

## 2019-07-08 PROCEDURE — C9600 PERC DRUG-EL COR STENT SING: HCPCS | Performed by: INTERNAL MEDICINE

## 2019-07-08 PROCEDURE — 99152 MOD SED SAME PHYS/QHP 5/>YRS: CPT | Performed by: INTERNAL MEDICINE

## 2019-07-08 PROCEDURE — C1757 CATH, THROMBECTOMY/EMBOLECT: HCPCS | Performed by: INTERNAL MEDICINE

## 2019-07-08 PROCEDURE — 25000128 H RX IP 250 OP 636: Performed by: HOSPITALIST

## 2019-07-08 PROCEDURE — 92928 PRQ TCAT PLMT NTRAC ST 1 LES: CPT | Mod: RC | Performed by: INTERNAL MEDICINE

## 2019-07-08 PROCEDURE — 87640 STAPH A DNA AMP PROBE: CPT | Performed by: INTERNAL MEDICINE

## 2019-07-08 PROCEDURE — 93005 ELECTROCARDIOGRAM TRACING: CPT

## 2019-07-08 PROCEDURE — C1887 CATHETER, GUIDING: HCPCS | Performed by: INTERNAL MEDICINE

## 2019-07-08 PROCEDURE — 25000128 H RX IP 250 OP 636

## 2019-07-08 PROCEDURE — 25000132 ZZH RX MED GY IP 250 OP 250 PS 637: Performed by: PHYSICIAN ASSISTANT

## 2019-07-08 PROCEDURE — 99232 SBSQ HOSP IP/OBS MODERATE 35: CPT | Performed by: INTERNAL MEDICINE

## 2019-07-08 PROCEDURE — 80048 BASIC METABOLIC PNL TOTAL CA: CPT | Performed by: HOSPITALIST

## 2019-07-08 PROCEDURE — 25000132 ZZH RX MED GY IP 250 OP 250 PS 637: Performed by: INTERNAL MEDICINE

## 2019-07-08 PROCEDURE — 27210794 ZZH OR GENERAL SUPPLY STERILE: Performed by: INTERNAL MEDICINE

## 2019-07-08 PROCEDURE — 99153 MOD SED SAME PHYS/QHP EA: CPT | Performed by: INTERNAL MEDICINE

## 2019-07-08 PROCEDURE — 25000128 H RX IP 250 OP 636: Performed by: INTERNAL MEDICINE

## 2019-07-08 PROCEDURE — 99232 SBSQ HOSP IP/OBS MODERATE 35: CPT | Mod: 25 | Performed by: INTERNAL MEDICINE

## 2019-07-08 PROCEDURE — 027236Z DILATION OF CORONARY ARTERY, THREE ARTERIES WITH THREE DRUG-ELUTING INTRALUMINAL DEVICES, PERCUTANEOUS APPROACH: ICD-10-PCS | Performed by: INTERNAL MEDICINE

## 2019-07-08 PROCEDURE — C1725 CATH, TRANSLUMIN NON-LASER: HCPCS | Performed by: INTERNAL MEDICINE

## 2019-07-08 PROCEDURE — 93010 ELECTROCARDIOGRAM REPORT: CPT | Performed by: INTERNAL MEDICINE

## 2019-07-08 PROCEDURE — 00000146 ZZHCL STATISTIC GLUCOSE BY METER IP

## 2019-07-08 PROCEDURE — 85730 THROMBOPLASTIN TIME PARTIAL: CPT | Performed by: HOSPITALIST

## 2019-07-08 PROCEDURE — 93458 L HRT ARTERY/VENTRICLE ANGIO: CPT | Mod: 26 | Performed by: INTERNAL MEDICINE

## 2019-07-08 PROCEDURE — 40000275 ZZH STATISTIC RCP TIME EA 10 MIN

## 2019-07-08 PROCEDURE — 85347 COAGULATION TIME ACTIVATED: CPT

## 2019-07-08 PROCEDURE — 80061 LIPID PANEL: CPT | Performed by: HOSPITALIST

## 2019-07-08 PROCEDURE — C1874 STENT, COATED/COV W/DEL SYS: HCPCS | Performed by: INTERNAL MEDICINE

## 2019-07-08 PROCEDURE — 20000003 ZZH R&B ICU

## 2019-07-08 PROCEDURE — C1769 GUIDE WIRE: HCPCS | Performed by: INTERNAL MEDICINE

## 2019-07-08 PROCEDURE — C1760 CLOSURE DEV, VASC: HCPCS | Performed by: INTERNAL MEDICINE

## 2019-07-08 PROCEDURE — 93458 L HRT ARTERY/VENTRICLE ANGIO: CPT | Performed by: INTERNAL MEDICINE

## 2019-07-08 PROCEDURE — B2111ZZ FLUOROSCOPY OF MULTIPLE CORONARY ARTERIES USING LOW OSMOLAR CONTRAST: ICD-10-PCS | Performed by: INTERNAL MEDICINE

## 2019-07-08 PROCEDURE — 25000131 ZZH RX MED GY IP 250 OP 636 PS 637: Performed by: INTERNAL MEDICINE

## 2019-07-08 PROCEDURE — 92973 PRQ TRLUML C MCHN ASP THRMBC: CPT | Performed by: INTERNAL MEDICINE

## 2019-07-08 DEVICE — STENT SYNERGY DRUG ELUTING 3.50X16MM  H7493926016350: Type: IMPLANTABLE DEVICE | Status: FUNCTIONAL

## 2019-07-08 DEVICE — STENT SYNERGY DRUG ELUTING 3.50X32MM  H7493926032350: Type: IMPLANTABLE DEVICE | Status: FUNCTIONAL

## 2019-07-08 DEVICE — STENT SYNERGY DRUG ELUTING 3.00X32MM  H7493926032300: Type: IMPLANTABLE DEVICE | Status: FUNCTIONAL

## 2019-07-08 RX ORDER — SODIUM CHLORIDE 9 MG/ML
INJECTION, SOLUTION INTRAVENOUS CONTINUOUS
Status: ACTIVE | OUTPATIENT
Start: 2019-07-08 | End: 2019-07-08

## 2019-07-08 RX ORDER — ARGATROBAN 1 MG/ML
350 INJECTION, SOLUTION INTRAVENOUS
Status: DISCONTINUED | OUTPATIENT
Start: 2019-07-08 | End: 2019-07-08 | Stop reason: HOSPADM

## 2019-07-08 RX ORDER — SODIUM CHLORIDE 9 MG/ML
INJECTION, SOLUTION INTRAVENOUS CONTINUOUS
Status: DISCONTINUED | OUTPATIENT
Start: 2019-07-08 | End: 2019-07-08 | Stop reason: HOSPADM

## 2019-07-08 RX ORDER — ACETAMINOPHEN 325 MG/1
650 TABLET ORAL EVERY 4 HOURS PRN
Status: DISCONTINUED | OUTPATIENT
Start: 2019-07-08 | End: 2019-07-08

## 2019-07-08 RX ORDER — FENTANYL CITRATE 50 UG/ML
INJECTION, SOLUTION INTRAMUSCULAR; INTRAVENOUS
Status: DISCONTINUED | OUTPATIENT
Start: 2019-07-08 | End: 2019-07-08 | Stop reason: HOSPADM

## 2019-07-08 RX ORDER — LISINOPRIL 10 MG/1
10 TABLET ORAL DAILY
Status: DISCONTINUED | OUTPATIENT
Start: 2019-07-09 | End: 2019-07-09 | Stop reason: HOSPADM

## 2019-07-08 RX ORDER — CLOPIDOGREL BISULFATE 75 MG/1
TABLET ORAL
Status: DISCONTINUED | OUTPATIENT
Start: 2019-07-08 | End: 2019-07-08 | Stop reason: HOSPADM

## 2019-07-08 RX ORDER — LORAZEPAM 0.5 MG/1
0.5 TABLET ORAL
Status: DISCONTINUED | OUTPATIENT
Start: 2019-07-08 | End: 2019-07-08 | Stop reason: HOSPADM

## 2019-07-08 RX ORDER — SODIUM NITROPRUSSIDE 25 MG/ML
INJECTION INTRAVENOUS
Status: DISCONTINUED
Start: 2019-07-08 | End: 2019-07-08 | Stop reason: HOSPADM

## 2019-07-08 RX ORDER — ONDANSETRON 2 MG/ML
INJECTION INTRAMUSCULAR; INTRAVENOUS
Status: COMPLETED
Start: 2019-07-08 | End: 2019-07-08

## 2019-07-08 RX ORDER — ARGATROBAN 1 MG/ML
150 INJECTION, SOLUTION INTRAVENOUS
Status: DISCONTINUED | OUTPATIENT
Start: 2019-07-08 | End: 2019-07-08 | Stop reason: HOSPADM

## 2019-07-08 RX ORDER — FLUMAZENIL 0.1 MG/ML
0.2 INJECTION, SOLUTION INTRAVENOUS
Status: ACTIVE | OUTPATIENT
Start: 2019-07-08 | End: 2019-07-09

## 2019-07-08 RX ORDER — NALOXONE HYDROCHLORIDE 0.4 MG/ML
.1-.4 INJECTION, SOLUTION INTRAMUSCULAR; INTRAVENOUS; SUBCUTANEOUS
Status: DISCONTINUED | OUTPATIENT
Start: 2019-07-08 | End: 2019-07-09 | Stop reason: HOSPADM

## 2019-07-08 RX ORDER — HEPARIN SODIUM 1000 [USP'U]/ML
INJECTION, SOLUTION INTRAVENOUS; SUBCUTANEOUS
Status: DISCONTINUED
Start: 2019-07-08 | End: 2019-07-08 | Stop reason: HOSPADM

## 2019-07-08 RX ORDER — ATROPINE SULFATE 0.1 MG/ML
0.5 INJECTION INTRAVENOUS EVERY 5 MIN PRN
Status: ACTIVE | OUTPATIENT
Start: 2019-07-08 | End: 2019-07-09

## 2019-07-08 RX ORDER — NITROGLYCERIN 5 MG/ML
VIAL (ML) INTRAVENOUS
Status: DISCONTINUED | OUTPATIENT
Start: 2019-07-08 | End: 2019-07-08 | Stop reason: HOSPADM

## 2019-07-08 RX ORDER — EPTIFIBATIDE 2 MG/ML
180 INJECTION, SOLUTION INTRAVENOUS EVERY 10 MIN PRN
Status: DISCONTINUED | OUTPATIENT
Start: 2019-07-08 | End: 2019-07-08 | Stop reason: HOSPADM

## 2019-07-08 RX ORDER — EPTIFIBATIDE 2 MG/ML
2 INJECTION, SOLUTION INTRAVENOUS CONTINUOUS PRN
Status: DISCONTINUED | OUTPATIENT
Start: 2019-07-08 | End: 2019-07-08 | Stop reason: HOSPADM

## 2019-07-08 RX ORDER — NOREPINEPHRINE BITARTRATE 0.06 MG/ML
.03-.4 INJECTION, SOLUTION INTRAVENOUS CONTINUOUS PRN
Status: DISCONTINUED | OUTPATIENT
Start: 2019-07-08 | End: 2019-07-08 | Stop reason: HOSPADM

## 2019-07-08 RX ORDER — NITROGLYCERIN 20 MG/100ML
.07-1.87 INJECTION INTRAVENOUS CONTINUOUS PRN
Status: DISCONTINUED | OUTPATIENT
Start: 2019-07-08 | End: 2019-07-08 | Stop reason: HOSPADM

## 2019-07-08 RX ORDER — FENTANYL CITRATE 50 UG/ML
25-50 INJECTION, SOLUTION INTRAMUSCULAR; INTRAVENOUS
Status: ACTIVE | OUTPATIENT
Start: 2019-07-08 | End: 2019-07-09

## 2019-07-08 RX ORDER — LIDOCAINE HYDROCHLORIDE 10 MG/ML
INJECTION, SOLUTION EPIDURAL; INFILTRATION; INTRACAUDAL; PERINEURAL
Status: DISCONTINUED
Start: 2019-07-08 | End: 2019-07-08 | Stop reason: HOSPADM

## 2019-07-08 RX ORDER — LORAZEPAM 2 MG/ML
0.5 INJECTION INTRAMUSCULAR
Status: DISCONTINUED | OUTPATIENT
Start: 2019-07-08 | End: 2019-07-08 | Stop reason: HOSPADM

## 2019-07-08 RX ORDER — FUROSEMIDE 20 MG
20 TABLET ORAL
Status: DISCONTINUED | OUTPATIENT
Start: 2019-07-08 | End: 2019-07-08

## 2019-07-08 RX ORDER — NALOXONE HYDROCHLORIDE 0.4 MG/ML
.2-.4 INJECTION, SOLUTION INTRAMUSCULAR; INTRAVENOUS; SUBCUTANEOUS
Status: DISCONTINUED | OUTPATIENT
Start: 2019-07-08 | End: 2019-07-08

## 2019-07-08 RX ORDER — IOPAMIDOL 755 MG/ML
INJECTION, SOLUTION INTRAVASCULAR
Status: DISCONTINUED | OUTPATIENT
Start: 2019-07-08 | End: 2019-07-08 | Stop reason: HOSPADM

## 2019-07-08 RX ORDER — SODIUM NITROPRUSSIDE 25 MG/ML
INJECTION INTRAVENOUS
Status: DISCONTINUED | OUTPATIENT
Start: 2019-07-08 | End: 2019-07-08 | Stop reason: HOSPADM

## 2019-07-08 RX ORDER — FENTANYL CITRATE 50 UG/ML
INJECTION, SOLUTION INTRAMUSCULAR; INTRAVENOUS
Status: DISCONTINUED
Start: 2019-07-08 | End: 2019-07-08 | Stop reason: HOSPADM

## 2019-07-08 RX ORDER — CALCIUM CARBONATE 500 MG/1
1000 TABLET, CHEWABLE ORAL EVERY 4 HOURS PRN
Status: DISCONTINUED | OUTPATIENT
Start: 2019-07-08 | End: 2019-07-09 | Stop reason: HOSPADM

## 2019-07-08 RX ORDER — HYDROCODONE BITARTRATE AND ACETAMINOPHEN 5; 325 MG/1; MG/1
1-2 TABLET ORAL EVERY 4 HOURS PRN
Status: DISCONTINUED | OUTPATIENT
Start: 2019-07-08 | End: 2019-07-09 | Stop reason: HOSPADM

## 2019-07-08 RX ORDER — NITROGLYCERIN 5 MG/ML
VIAL (ML) INTRAVENOUS
Status: DISCONTINUED
Start: 2019-07-08 | End: 2019-07-08 | Stop reason: HOSPADM

## 2019-07-08 RX ORDER — DOBUTAMINE HYDROCHLORIDE 200 MG/100ML
2-20 INJECTION INTRAVENOUS CONTINUOUS PRN
Status: DISCONTINUED | OUTPATIENT
Start: 2019-07-08 | End: 2019-07-08 | Stop reason: HOSPADM

## 2019-07-08 RX ORDER — HEPARIN SODIUM 1000 [USP'U]/ML
INJECTION, SOLUTION INTRAVENOUS; SUBCUTANEOUS
Status: DISCONTINUED | OUTPATIENT
Start: 2019-07-08 | End: 2019-07-08 | Stop reason: HOSPADM

## 2019-07-08 RX ORDER — CLOPIDOGREL BISULFATE 75 MG/1
75 TABLET ORAL DAILY
Status: DISCONTINUED | OUTPATIENT
Start: 2019-07-09 | End: 2019-07-09 | Stop reason: HOSPADM

## 2019-07-08 RX ORDER — FUROSEMIDE 20 MG/1
10 TABLET ORAL
Status: DISCONTINUED | OUTPATIENT
Start: 2019-07-08 | End: 2019-07-09 | Stop reason: HOSPADM

## 2019-07-08 RX ORDER — DOPAMINE HYDROCHLORIDE 160 MG/100ML
2-20 INJECTION, SOLUTION INTRAVENOUS CONTINUOUS PRN
Status: DISCONTINUED | OUTPATIENT
Start: 2019-07-08 | End: 2019-07-08 | Stop reason: HOSPADM

## 2019-07-08 RX ORDER — LIDOCAINE 40 MG/G
CREAM TOPICAL
Status: DISCONTINUED | OUTPATIENT
Start: 2019-07-08 | End: 2019-07-08 | Stop reason: HOSPADM

## 2019-07-08 RX ORDER — NITROGLYCERIN 0.4 MG/1
0.4 TABLET SUBLINGUAL EVERY 5 MIN PRN
Status: DISCONTINUED | OUTPATIENT
Start: 2019-07-08 | End: 2019-07-09 | Stop reason: HOSPADM

## 2019-07-08 RX ORDER — ADENOSINE 3 MG/ML
INJECTION, SOLUTION INTRAVENOUS
Status: DISCONTINUED
Start: 2019-07-08 | End: 2019-07-08 | Stop reason: HOSPADM

## 2019-07-08 RX ORDER — POTASSIUM CHLORIDE 1500 MG/1
20 TABLET, EXTENDED RELEASE ORAL
Status: COMPLETED | OUTPATIENT
Start: 2019-07-08 | End: 2019-07-08

## 2019-07-08 RX ORDER — ADENOSINE 3 MG/ML
INJECTION, SOLUTION INTRAVENOUS
Status: DISCONTINUED | OUTPATIENT
Start: 2019-07-08 | End: 2019-07-08 | Stop reason: HOSPADM

## 2019-07-08 RX ORDER — CLOPIDOGREL 300 MG/1
TABLET, FILM COATED ORAL
Status: DISCONTINUED
Start: 2019-07-08 | End: 2019-07-08 | Stop reason: HOSPADM

## 2019-07-08 RX ORDER — ASPIRIN 81 MG/1
81 TABLET ORAL DAILY
Status: DISCONTINUED | OUTPATIENT
Start: 2019-07-09 | End: 2019-07-09 | Stop reason: HOSPADM

## 2019-07-08 RX ORDER — LISINOPRIL 5 MG/1
5 TABLET ORAL ONCE
Status: COMPLETED | OUTPATIENT
Start: 2019-07-08 | End: 2019-07-08

## 2019-07-08 RX ADMIN — HEPARIN SODIUM 3000 UNITS: 1000 INJECTION, SOLUTION INTRAVENOUS; SUBCUTANEOUS at 14:40

## 2019-07-08 RX ADMIN — CARVEDILOL 25 MG: 25 TABLET, FILM COATED ORAL at 08:57

## 2019-07-08 RX ADMIN — SODIUM NITROPRUSSIDE 100 MCG: 25 INJECTION INTRAVENOUS at 14:40

## 2019-07-08 RX ADMIN — ADENOSINE 240 MCG: 3 INJECTION, SOLUTION INTRAVENOUS at 14:41

## 2019-07-08 RX ADMIN — NITROGLYCERIN 200 MCG: 5 INJECTION, SOLUTION INTRAVENOUS at 14:23

## 2019-07-08 RX ADMIN — LISINOPRIL 5 MG: 5 TABLET ORAL at 10:36

## 2019-07-08 RX ADMIN — MIDAZOLAM 2 MG: 1 INJECTION INTRAMUSCULAR; INTRAVENOUS at 14:03

## 2019-07-08 RX ADMIN — MIDAZOLAM 1 MG: 1 INJECTION INTRAMUSCULAR; INTRAVENOUS at 14:19

## 2019-07-08 RX ADMIN — CLOPIDOGREL BISULFATE 600 MG: 75 TABLET ORAL at 14:18

## 2019-07-08 RX ADMIN — FENTANYL CITRATE 50 MCG: 50 INJECTION, SOLUTION INTRAMUSCULAR; INTRAVENOUS at 14:58

## 2019-07-08 RX ADMIN — LIDOCAINE HYDROCHLORIDE 9 ML: 10 INJECTION, SOLUTION INFILTRATION; PERINEURAL at 14:03

## 2019-07-08 RX ADMIN — CARVEDILOL 25 MG: 25 TABLET, FILM COATED ORAL at 17:36

## 2019-07-08 RX ADMIN — ASPIRIN 325 MG: 325 TABLET, DELAYED RELEASE ORAL at 08:58

## 2019-07-08 RX ADMIN — HEPARIN SODIUM 1350 UNITS/HR: 10000 INJECTION, SOLUTION INTRAVENOUS at 11:34

## 2019-07-08 RX ADMIN — ISOSORBIDE MONONITRATE 30 MG: 30 TABLET, EXTENDED RELEASE ORAL at 08:57

## 2019-07-08 RX ADMIN — MIDAZOLAM 1 MG: 1 INJECTION INTRAMUSCULAR; INTRAVENOUS at 14:58

## 2019-07-08 RX ADMIN — NITROGLYCERIN 200 MCG: 5 INJECTION, SOLUTION INTRAVENOUS at 15:18

## 2019-07-08 RX ADMIN — ONDANSETRON 4 MG: 2 INJECTION INTRAMUSCULAR; INTRAVENOUS at 15:26

## 2019-07-08 RX ADMIN — SODIUM CHLORIDE: 9 INJECTION, SOLUTION INTRAVENOUS at 16:19

## 2019-07-08 RX ADMIN — NITROGLYCERIN 200 MCG: 5 INJECTION, SOLUTION INTRAVENOUS at 14:57

## 2019-07-08 RX ADMIN — POTASSIUM CHLORIDE 20 MEQ: 1500 TABLET, EXTENDED RELEASE ORAL at 08:57

## 2019-07-08 RX ADMIN — POTASSIUM CHLORIDE 10 MEQ: 750 TABLET, FILM COATED, EXTENDED RELEASE ORAL at 20:33

## 2019-07-08 RX ADMIN — NITROGLYCERIN 200 MCG: 5 INJECTION, SOLUTION INTRAVENOUS at 14:47

## 2019-07-08 RX ADMIN — POTASSIUM CHLORIDE 10 MEQ: 750 TABLET, FILM COATED, EXTENDED RELEASE ORAL at 09:00

## 2019-07-08 RX ADMIN — NITROGLYCERIN 200 MCG: 5 INJECTION, SOLUTION INTRAVENOUS at 15:04

## 2019-07-08 RX ADMIN — LEVOTHYROXINE SODIUM 137 MCG: 25 TABLET ORAL at 08:56

## 2019-07-08 RX ADMIN — IOPAMIDOL 218 ML: 755 INJECTION, SOLUTION INTRAVENOUS at 15:27

## 2019-07-08 RX ADMIN — HEPARIN SODIUM 10000 UNITS: 1000 INJECTION, SOLUTION INTRAVENOUS; SUBCUTANEOUS at 14:16

## 2019-07-08 RX ADMIN — ATORVASTATIN CALCIUM 40 MG: 40 TABLET, FILM COATED ORAL at 20:33

## 2019-07-08 RX ADMIN — LISINOPRIL 5 MG: 5 TABLET ORAL at 08:57

## 2019-07-08 RX ADMIN — SODIUM CHLORIDE: 9 INJECTION, SOLUTION INTRAVENOUS at 11:42

## 2019-07-08 RX ADMIN — INSULIN GLARGINE 15 UNITS: 100 INJECTION, SOLUTION SUBCUTANEOUS at 10:23

## 2019-07-08 RX ADMIN — PANTOPRAZOLE SODIUM 40 MG: 40 TABLET, DELAYED RELEASE ORAL at 06:27

## 2019-07-08 RX ADMIN — FENTANYL CITRATE 50 MCG: 50 INJECTION, SOLUTION INTRAMUSCULAR; INTRAVENOUS at 14:03

## 2019-07-08 ASSESSMENT — ACTIVITIES OF DAILY LIVING (ADL)
ADLS_ACUITY_SCORE: 12

## 2019-07-08 ASSESSMENT — MIFFLIN-ST. JEOR
SCORE: 1928.5
SCORE: 1933.36

## 2019-07-08 NOTE — PLAN OF CARE
PT: Received orders for cardiac evaluation and treatment.  Patient scheduled for angiogram today at noon. Will hold CR evaluation and education until post procedure.

## 2019-07-08 NOTE — PROGRESS NOTES
[facility]    Medicine Progress Note - Hospitalist Service       Date of Admission:  7/5/2019  Date of Service: 07/08/2019    Assessment & Plan     Jayro Elder is a 69 year old male with a PMH significant for DM, CAD with multiple stents, a fib on Eliquis, HTN, systolic CHF with EF 35-40%, PVD, hx of CVA, MARISOL and hx of tobacco use, who presented 7/5/2019 with exertional chest pain. Work up in ED unremarkable. Patient was initially admitted to observation and developed recurrent CP on morning of 7/6/2019. Cardiology was consulted. Due to concerns of evolving unstable angina and ACS, patient transferred to inpatient status, started on IV heparin, with anticipation of angiogram on 7/82019.     1. Concern for Unstable angina: Extensive history of CAD, some of the symptoms are atypical although multiple risk factors including IDDM   - Cardiology following, angiogram planned for today   - IV heparin per pharmacy protocol, Hold Eliquis for now   - Continue asa, home BB, ACEI, statin  - ECHO similar to before     2. IDDM -   - PTA on Lantus 36 units,  Novolog with meals ~ 8 units TID and sliding scale insulin  - Diabetic diet  - Give lower dose of Lantus 15 units and decrease Novolog to 4 units TID given NPO status, adjust based on sugar trend    3. HTN - resume PTA coreg, lisinopril (with parameters) and lasix  4. Ischemic cardiomyopathy, chronic Systolic CHF,  - EF 35-40%. Denies increased LE edema, notes orthopnea since dx with pna 2 weeks ago, cough has improved/resolved. Does not appear volume overloaded. Resume home lasix 80 mg daily   5. MARISOL - does not use CPAP  6. Chronic A fib -  rate controlled. On IV heparin for anticoagulation     Diet: Combination Diet [id number] Calories: Moderate Consistent CHO (4-6 CHO units/meal); Low Saturated Fat Na <2400mg Diet, No Caffeine Diet  NPO for Medical/Clinical Reasons Except for: Meds    DVT Prophylaxis: heparin gtt   Taylor Catheter: not present  Code Status:  Full Code      Disposition Plan   Expected discharge: Pending angiogram findings  Entered: Lilibeth Pereira MD 07/08/2019, 11:34 AM       The patient's care was discussed with the Bedside Nurse and Patient.    Lilibeth Pereira MD  Hospitalist Service  [facility]    ______________________________________________________________________    Interval History   Chart reviewed and patient seen. Case discussed with nursing staff.     Patient feels ok, reports feeling strange, waiting for angiogram, no shortness of breath, no chest pain, No reports of abdominal pain or vomiting. No nausea, No new complaints voiced otherwise.       Data reviewed today: I reviewed all medications, new labs and imaging results over the last 24 hours. I personally reviewed    Physical Exam   Vital Signs: Temp: 96.1  F (35.6  C) Temp src: Oral BP: (!) 150/97 Pulse: 74 Heart Rate: 74 Resp: 18 SpO2: 98 % O2 Device: None (Room air)    Weight: 235 lbs 3.2 oz    GENERAL:  Awake and alert, No acute distress.  PSYCH: appropriate affect, no acute agitation   HEENT:  Neck is Supple, trachea is midline, EOMI, conjunctiva clear  CARDIOVASCULAR: Regular rate and rhythm, Normal S1, S2, no loud murmurs, no rubs or gallops.   PULMONARY:  Clear to auscultation bilaterally. Good air entry on both sides  GI: Abdomen is soft, non tender, non-distended, bowel sounds present. No rebound or guarding   SKIN:  No cyanosis or clubbing, no obvious exanthems on exposed areas   MSK: Extremities are warm and well perfused. No pitting edema   Neuro: Awake and oriented x 3. Moving all extremities with good strength     Data   Recent Labs   Lab 07/08/19  0643 07/06/19  1015 07/05/19  2051 07/05/19  1702 07/05/19  1301   WBC  --  5.6  --   --  6.8   HGB  --  12.1*  --   --  12.5*   MCV  --  88  --   --  88   PLT  --  226  --   --  246     --   --   --  135   POTASSIUM 3.8  --   --   --  4.4   CHLORIDE 102  --   --   --  104   CO2 29  --   --   --  24    BUN 20  --   --   --  17   CR 1.14  --   --   --  0.99   ANIONGAP 7  --   --   --  7   BETTIE 9.2  --   --   --  8.8   *  --   --   --  240*   TROPI  --   --  <0.015 <0.015 <0.015       No results found for this or any previous visit (from the past 24 hour(s)).  Medications     Continuing ACE inhibitor/ARB/ARNI from home medication list OR ACE inhibitor/ARB order already placed during this visit       - MEDICATION INSTRUCTIONS -       - MEDICATION INSTRUCTIONS -       - MEDICATION INSTRUCTIONS -       HEParin 1,350 Units/hr (07/08/19 0900)     - MEDICATION INSTRUCTIONS -       - MEDICATION INSTRUCTIONS -       - MEDICATION INSTRUCTIONS -       sodium chloride         fentaNYL (PF)         heparin (porcine)         lidocaine (PF)         midazolam         aspirin  81 mg Oral Daily     atorvastatin  40 mg Oral QPM     carvedilol  25 mg Oral BID w/meals     furosemide  80 mg Oral Daily     insulin aspart  8 Units Subcutaneous TID w/meals     insulin aspart  1-7 Units Subcutaneous TID AC     insulin aspart  1-5 Units Subcutaneous At Bedtime     insulin glargine  36 Units Subcutaneous QAM     isosorbide mononitrate  30 mg Oral Daily     levothyroxine  137 mcg Oral Daily     [START ON 7/9/2019] lisinopril  10 mg Oral Daily     pantoprazole  40 mg Oral QAM AC     potassium chloride ER  10 mEq Oral BID     sodium chloride (PF)  3 mL Intracatheter Q8H     sodium chloride (PF)  3 mL Intracatheter Q8H

## 2019-07-08 NOTE — PLAN OF CARE
"Pt vital stable. Sinus rhythm.HepXA is not being used due to eloquis usage prior to admit. Monitoring PTT. Pt refused PO lasix this morning. Heparin running at 13.5unit/kg/hr prior to going to cath lab for angiogram. Pt felt \"weird\" around neck and chest \"fluttering\", but said that he has had that for a few days. 3 toes amputated on right foot and 1 callous removed from left prior to this admission. Pt admits that his diabetes is not well controlled at home with HA1C 8.8. Standby assist activity level. A/Ox4. Pt still at cath lab at end of shift. 4 prior angiograms with a total of 7 stents placed prior to this admission.   "

## 2019-07-08 NOTE — PLAN OF CARE
"A&O. Tele Afib CVR with BBB. VSS on RA. Denies pain. , 144. Hep gtt infusing at 13.5 mL/hr. Using urinal at bedside, good UOP. Up with SBA. NPO at midnight. Plan for angiogram today.    /89 (BP Location: Left arm)   Pulse 74   Temp 97.4  F (36.3  C) (Oral)   Resp 16   Ht 1.93 m (6' 4\")   Wt 106.8 kg (235 lb 6.4 oz)   SpO2 94%   BMI 28.65 kg/m     "

## 2019-07-08 NOTE — PROGRESS NOTES
[facility]    Cardiology Progress Note    Date of Service (when I saw the patient): 07/08/2019     Primary cardiology team: Dr Tomlin/ Gill JAVIER    Assessment & Plan   Jayro Elder is a 69 year old male who was admitted on 7/5/2019 with CP/nausea/diaphoresis concerning for unstable angina. His hx includes an ischemic cardiomyopathy in the 35-40% range, status post PCI to LAD after an MI in 2005, he also had intervention of the LAD in 2009 and 2013 and most recently to the obtuse marginal in June 2017, chronic persistent atrial fibrillation, history of CVA, and poorly controlled diabetes.    1. Exertional chest discomfort with associated nausea/diaphoresis/shortness of breath.  The clinical history is highly suggestive of accelerating/unstable angina.  2. Coronary artery disease status post multiple previous percutaneous interventions to the LAD and most recently stenting of the third obtuse marginal in June 2017  3. Ischemic cardiomyopathy with an LVEF of 35 to 40%, stable on TTE this stay  4. Chronic persistent atrial fibrillation  5. History of CVA in the past  6. Poorly controlled diabetes  7. HTN. Pt reports BP has typically well controlled, but has been running higher the past couple weeks.     PLAN:  -Cor angio with possible intervention today. R/B reviewed.   -Continue hep gtt until angio (Eliquis currently on hold)  -Continue asa, atorvastatin 40mg   -Continue coreg 25mg BID, Imdur 30mg daily, will increase lisinopril to 10mg daily  -Does not appear volume overloaded, continue furosemide    Jayro already has follow up with Gill JAVIER in the [facility] scheduled for 7/15/19.     CONCEPCION Porras-C    Interval History   Some CP yesterday, feeling ok so far today.     Tele: Afib, no other concerning findings noted     Physical Exam   Temp: 96.1  F (35.6  C) Temp src: Oral BP: (!) 150/97 Pulse: 74 Heart Rate: 74 Resp: 18 SpO2: 98 % O2 Device: None (Room air)    Vitals:     07/05/19 1643 07/07/19 0605 07/08/19 0630   Weight: 110.7 kg (244 lb 1.6 oz) 106.8 kg (235 lb 6.4 oz) 106.7 kg (235 lb 3.2 oz)     Vital Signs with Ranges  Temp:  [96.1  F (35.6  C)-97.7  F (36.5  C)] 96.1  F (35.6  C)  Pulse:  [74-79] 74  Heart Rate:  [74-75] 74  Resp:  [16-18] 18  BP: (122-154)/(76-97) 150/97  SpO2:  [94 %-98 %] 98 %  I/O last 3 completed shifts:  In: 570 [P.O.:570]  Out: 2500 [Urine:2500]    Constitutional     alert and oriented, in no acute distress.     Skin     warm and dry to touch    Lungs  clear to auscultation     Cardiac  irregular rhythm, S1 normal, S2 normal, no murmurs, no rubs    Extremities and Back     No edema observed.        Neurological     no gross motor deficits noted, affect appropriate, oriented to time, person and place.      Medications     Continuing ACE inhibitor/ARB/ARNI from home medication list OR ACE inhibitor/ARB order already placed during this visit       - MEDICATION INSTRUCTIONS -       - MEDICATION INSTRUCTIONS -       - MEDICATION INSTRUCTIONS -       HEParin 1,350 Units/hr (07/08/19 0900)     - MEDICATION INSTRUCTIONS -       - MEDICATION INSTRUCTIONS -       - MEDICATION INSTRUCTIONS -       sodium chloride         aspirin  81 mg Oral Daily     atorvastatin  40 mg Oral QPM     carvedilol  25 mg Oral BID w/meals     furosemide  80 mg Oral Daily     insulin aspart  8 Units Subcutaneous TID w/meals     insulin aspart  1-7 Units Subcutaneous TID AC     insulin aspart  1-5 Units Subcutaneous At Bedtime     insulin glargine  15 Units Subcutaneous Once     insulin glargine  36 Units Subcutaneous QAM     isosorbide mononitrate  30 mg Oral Daily     levothyroxine  137 mcg Oral Daily     lisinopril  5 mg Oral Daily     pantoprazole  40 mg Oral QAM AC     potassium chloride ER  10 mEq Oral BID     sodium chloride (PF)  3 mL Intracatheter Q8H     sodium chloride (PF)  3 mL Intracatheter Q8H       Data   Most Recent 3 CBC's:  Recent Labs   Lab Test 07/06/19  1015  07/05/19  1301 05/07/19  0732   WBC 5.6 6.8 6.6   HGB 12.1* 12.5* 11.9*   MCV 88 88 86    246 235     Most Recent 3 BMP's:  Recent Labs   Lab Test 07/08/19  0643 07/05/19  1301 05/07/19  0732    135 135   POTASSIUM 3.8 4.4 4.0   CHLORIDE 102 104 101   CO2 29 24 27   BUN 20 17 25   CR 1.14 0.99 1.21   ANIONGAP 7 7 7   BETTIE 9.2 8.8 9.2   * 240* 258*     Most Recent 3 Troponin's:  Recent Labs   Lab Test 07/05/19  2051 07/05/19  1702 07/05/19  1301   TROPI <0.015 <0.015 <0.015     Most Recent Cholesterol Panel:  Recent Labs   Lab Test 01/18/19  0611   CHOL 81   LDL 33   HDL 32*   TRIG 83     Most Recent TSH and T4:  Recent Labs   Lab Test 09/06/18  1542   TSH 0.40     Most Recent Hemoglobin A1c:  Recent Labs   Lab Test 07/06/19  1015   A1C 8.8*       Echocardiology:   Performed during this admission        Results:    The left ventricle is normal in size. Proximal septal thickening is noted.  Left ventricular systolic function is moderately reduced. The visual ejection  fraction is estimated at 35-40%. LVEF 36% based on biplane 2D tracing.  Diastolic function not assessed due to atrial fibrillation. There is akinesis  of the mid to apical anterior walls, the mid anteroseptal and apical septal  walls and the true LV apex. There is no thrombus seen in the left ventricle.  There is no thrombus seen in the left ventricle.  The right ventricle is normal size. Mildly decreased right ventricular  systolic function.  Trace to mild mitral and tricuspid regurgitation.  No pericardial effusion.  In comparison to the previous report dated 01/18/2019, the findings are  similar.

## 2019-07-08 NOTE — PLAN OF CARE
ICU End of Shift Summary.  For vital signs and complete assessments, please see documentation flowsheets.     Pertinent assessments: A&Ox4, VSS ex hypertensive, LS diminished, BS audible - BM 7/6. Full liquid diet. Tele - Afib CVR.   Major Shift Events: Post angio with intervention. Right groin site intact, CMS intact. Pt up SBA.   Plan (Upcoming Events): Monitor groin site  Discharge/Transfer Needs: TBD    Bedside Shift Report Completed : Y  Bedside Safety Check Completed: Y

## 2019-07-09 ENCOUNTER — APPOINTMENT (OUTPATIENT)
Dept: OCCUPATIONAL THERAPY | Facility: CLINIC | Age: 69
DRG: 247 | End: 2019-07-09
Attending: PHYSICIAN ASSISTANT
Payer: COMMERCIAL

## 2019-07-09 VITALS
HEART RATE: 85 BPM | BODY MASS INDEX: 28.51 KG/M2 | WEIGHT: 234.13 LBS | TEMPERATURE: 98.5 F | DIASTOLIC BLOOD PRESSURE: 90 MMHG | OXYGEN SATURATION: 100 % | SYSTOLIC BLOOD PRESSURE: 113 MMHG | HEIGHT: 76 IN | RESPIRATION RATE: 16 BRPM

## 2019-07-09 LAB
ANION GAP SERPL CALCULATED.3IONS-SCNC: 4 MMOL/L (ref 3–14)
APTT PPP: 31 SEC (ref 22–37)
BUN SERPL-MCNC: 16 MG/DL (ref 7–30)
CALCIUM SERPL-MCNC: 8.9 MG/DL (ref 8.5–10.1)
CHLORIDE SERPL-SCNC: 107 MMOL/L (ref 94–109)
CO2 SERPL-SCNC: 29 MMOL/L (ref 20–32)
CREAT SERPL-MCNC: 1.05 MG/DL (ref 0.66–1.25)
ERYTHROCYTE [DISTWIDTH] IN BLOOD BY AUTOMATED COUNT: 13.4 % (ref 10–15)
GFR SERPL CREATININE-BSD FRML MDRD: 72 ML/MIN/{1.73_M2}
GLUCOSE BLDC GLUCOMTR-MCNC: 199 MG/DL (ref 70–99)
GLUCOSE BLDC GLUCOMTR-MCNC: 77 MG/DL (ref 70–99)
GLUCOSE BLDC GLUCOMTR-MCNC: 84 MG/DL (ref 70–99)
GLUCOSE SERPL-MCNC: 82 MG/DL (ref 70–99)
HCT VFR BLD AUTO: 38.6 % (ref 40–53)
HGB BLD-MCNC: 12.6 G/DL (ref 13.3–17.7)
MCH RBC QN AUTO: 28.6 PG (ref 26.5–33)
MCHC RBC AUTO-ENTMCNC: 32.6 G/DL (ref 31.5–36.5)
MCV RBC AUTO: 88 FL (ref 78–100)
PLATELET # BLD AUTO: 218 10E9/L (ref 150–450)
POTASSIUM SERPL-SCNC: 3.9 MMOL/L (ref 3.4–5.3)
RBC # BLD AUTO: 4.4 10E12/L (ref 4.4–5.9)
SODIUM SERPL-SCNC: 140 MMOL/L (ref 133–144)
WBC # BLD AUTO: 5.6 10E9/L (ref 4–11)

## 2019-07-09 PROCEDURE — 25000132 ZZH RX MED GY IP 250 OP 250 PS 637: Performed by: PHYSICIAN ASSISTANT

## 2019-07-09 PROCEDURE — 85730 THROMBOPLASTIN TIME PARTIAL: CPT | Performed by: PHYSICIAN ASSISTANT

## 2019-07-09 PROCEDURE — 25000132 ZZH RX MED GY IP 250 OP 250 PS 637: Performed by: INTERNAL MEDICINE

## 2019-07-09 PROCEDURE — 36415 COLL VENOUS BLD VENIPUNCTURE: CPT | Performed by: PHYSICIAN ASSISTANT

## 2019-07-09 PROCEDURE — 80048 BASIC METABOLIC PNL TOTAL CA: CPT | Performed by: PHYSICIAN ASSISTANT

## 2019-07-09 PROCEDURE — 99232 SBSQ HOSP IP/OBS MODERATE 35: CPT | Performed by: INTERNAL MEDICINE

## 2019-07-09 PROCEDURE — 97535 SELF CARE MNGMENT TRAINING: CPT | Mod: GO

## 2019-07-09 PROCEDURE — 00000146 ZZHCL STATISTIC GLUCOSE BY METER IP

## 2019-07-09 PROCEDURE — 97165 OT EVAL LOW COMPLEX 30 MIN: CPT | Mod: GO

## 2019-07-09 PROCEDURE — 25000131 ZZH RX MED GY IP 250 OP 636 PS 637: Performed by: INTERNAL MEDICINE

## 2019-07-09 PROCEDURE — 85027 COMPLETE CBC AUTOMATED: CPT | Performed by: PHYSICIAN ASSISTANT

## 2019-07-09 PROCEDURE — 97110 THERAPEUTIC EXERCISES: CPT | Mod: GO

## 2019-07-09 PROCEDURE — 99239 HOSP IP/OBS DSCHRG MGMT >30: CPT | Performed by: INTERNAL MEDICINE

## 2019-07-09 RX ORDER — LISINOPRIL 10 MG/1
10 TABLET ORAL DAILY
Qty: 30 TABLET | Refills: 0 | Status: ON HOLD | OUTPATIENT
Start: 2019-07-09 | End: 2019-07-15

## 2019-07-09 RX ORDER — ASPIRIN 81 MG/1
81 TABLET ORAL DAILY
Start: 2019-07-09 | End: 2019-07-14

## 2019-07-09 RX ORDER — CLOPIDOGREL BISULFATE 75 MG/1
75 TABLET ORAL DAILY
Qty: 30 TABLET | Refills: 0 | Status: SHIPPED | OUTPATIENT
Start: 2019-07-10 | End: 2019-08-02

## 2019-07-09 RX ORDER — FUROSEMIDE 20 MG
10 TABLET ORAL 2 TIMES DAILY
Qty: 30 TABLET | Refills: 0 | Status: SHIPPED | OUTPATIENT
Start: 2019-07-09 | End: 2019-08-02

## 2019-07-09 RX ADMIN — ASPIRIN 81 MG: 81 TABLET, COATED ORAL at 11:44

## 2019-07-09 RX ADMIN — PANTOPRAZOLE SODIUM 40 MG: 40 TABLET, DELAYED RELEASE ORAL at 08:17

## 2019-07-09 RX ADMIN — LISINOPRIL 10 MG: 10 TABLET ORAL at 08:17

## 2019-07-09 RX ADMIN — INSULIN ASPART 2 UNITS: 100 INJECTION, SOLUTION INTRAVENOUS; SUBCUTANEOUS at 11:53

## 2019-07-09 RX ADMIN — ISOSORBIDE MONONITRATE 30 MG: 30 TABLET, EXTENDED RELEASE ORAL at 08:17

## 2019-07-09 RX ADMIN — Medication 10 MG: at 08:17

## 2019-07-09 RX ADMIN — POTASSIUM CHLORIDE 10 MEQ: 750 TABLET, FILM COATED, EXTENDED RELEASE ORAL at 08:17

## 2019-07-09 RX ADMIN — CLOPIDOGREL BISULFATE 75 MG: 75 TABLET ORAL at 11:44

## 2019-07-09 RX ADMIN — APIXABAN 5 MG: 5 TABLET, FILM COATED ORAL at 12:13

## 2019-07-09 RX ADMIN — INSULIN GLARGINE 15 UNITS: 100 INJECTION, SOLUTION SUBCUTANEOUS at 09:56

## 2019-07-09 RX ADMIN — LEVOTHYROXINE SODIUM 137 MCG: 25 TABLET ORAL at 08:17

## 2019-07-09 RX ADMIN — CARVEDILOL 25 MG: 25 TABLET, FILM COATED ORAL at 08:17

## 2019-07-09 ASSESSMENT — ACTIVITIES OF DAILY LIVING (ADL)
ADLS_ACUITY_SCORE: 12
PREVIOUS_RESPONSIBILITIES: MEAL PREP;HOUSEKEEPING;MEDICATION MANAGEMENT

## 2019-07-09 NOTE — PLAN OF CARE
OT/CR: Order received, eval completed and treatment initiated. Pt is a 69 year old male admitted due to chest pain, nausea, diaphoresis, and SOB concerning for unstable angina; found to have old anterior MI, now s/p PCI w/ SUSAN. Pt lives with spouse and grandchildren (5&10 yo) in house, 1 step to enter, stair lift to 2nd floor bedroom, walk in shower, RTS. Pt reports indep in all ADLs, IADls and mobility tasks with no AD at baseline. Spouse works outside of the home M-F, pt watches grandchildren. Pt is no longer able to drive due to visual deficits.    Discharge Planner OT   Patient plan for discharge: Home  Current status: Pt completed bed mobility supine > sit EOB with SBA. Pt completed sit > stand with SBA. Pt ambulated in room and in hallway ~ 175 ft with SBA and no assistive device. /89, HR 82 at rest; /89, HR 96 post activity. Pt completed toileting tasks and grooming/hygiene tasks of oral cares and face washing in stance at sink with SBA. Pt issued handouts and educated on signs/symptoms of activity intolerance, lifestyle modifications, OP CR and safe activities post PCI, verbalized understanding - is familiar with precautions due to multiple previous cardiac procedures.   Barriers to return to prior living situation: Decreased functional activity tolerance  Recommendations for discharge: Home w/ OP CR  Rationale for recommendations: Recommend OP CR for ongoing CV strengthening through monitored exercise and education post PCI w/ SUSAN. Pt reports due to spouse's work schedule and his inability to drive, he will not be able to attend OP CR. If transportation options unable to be arranged, would benefit from  services for ongoing strengthening and functional activity tolerance training.        Entered by: Sylvia Holm 07/09/2019 8:21 AM         Occupational Therapy and Cardiac Rehab Discharge Summary    Reason for therapy discharge:    Discharged to home with outpatient therapy.    Progress towards  therapy goal(s). See goals on Care Plan in [facility] health record for goal details.  Goals partially met.  Barriers to achieving goals:   discharge on same date as initial evaluation.    Therapy recommendation(s):    Continued therapy is recommended.  Rationale/Recommendations:  OP CR for CV strengthening through monitored exercise and education post PCI w/ SUSAN.

## 2019-07-09 NOTE — PLAN OF CARE
ICU End of Shift Summary.  For vital signs and complete assessments, please see documentation flowsheets.     Pertinent assessments: A&O. VSS. Afebrile. LS CL on RA. Denies pain. Tele afib. Up to bedside using urinal; adequate UOP.   Major Shift Events: Right femoral angiogram site unchanged; slight bleeding present since beginning of shift unchanged; CMS intact. No chest pain.   Plan (Upcoming Events): Monitor rt groin site. ? Discharge today  Discharge/Transfer Needs: TBD    Bedside Shift Report Completed :   Bedside Safety Check Completed:

## 2019-07-09 NOTE — PLAN OF CARE
ICU End of Shift Summary.  For vital signs and complete assessments, please see documentation flowsheets.     Pertinent assessments: VSS, afebrile, RA. AAOx4. Up SBA/Ind. Denies pain. Tele reads afib CVR. LS-clear, denies SOB. Tolerates diet, denies nausea. Voiding well. Right femoral angio site WNL.   Major Shift Events: eliquis/asa/plavix started, discharge today  Plan (Upcoming Events): discharge today  Discharge/Transfer Needs: pt discharged to home via son transport, discharge paperwork reviewed and questions answered, medications sent to outside pharmacy.

## 2019-07-09 NOTE — DISCHARGE SUMMARY
Discharge Summary  Hospitalist Service    Jayro Elder MRN# [id number]   YOB: 1950 Age: 69 year old     Date of Admission:  7/5/2019  Date of Discharge:  7/9/2019  Admitting Physician:  Ward Garner MD  Discharge Physician: Meenu Catherine MD  Discharging Service: Hospitalist Service     Primary Provider: No Ref-Primary, Physician  Primary Care Physician Phone Number: None         Discharge Diagnoses/Problem Oriented Hospital Course (Providers):    Jayro Elder was admitted on 7/5/2019 by Ward Garner MD and I would refer you to their history and physical.  The following problems were addressed during his hospitalization:    NSTEMI/subacute MI  Ischemic cardiomyopathy   T2 dm   htn  MARISOL  Chronic AFib           Code Status:      Full Code        Brief Hospital Stay Summary Sent Home With Patient in AVS:       Mr. Elder is a 69-year-old gentleman who has a past medical history significant for type 2 diabetes, coronary artery disease complicated by an ischemic cardiomyopathy with ejection fraction in the 35 to 40% range, atrial fibrillation on apixaban, hypertension, peripheral vascular disease with multiple toe amputations, history of stroke, obstructive sleep apnea intolerant to CPAP therapy, with history of tobacco use, presented on 7/5/2019 with exertional chest pain.    He was admitted, empirically placed on heparin and cardiology was consulted.  Troponins remained undetectable but his symptoms are quite concerning for an acute coronary syndrome.  This did prompt coronary angiogram completed 7/8/2019 with mid % stenosed due to clot, and distal % stenosis due to extensive calcium deposition.  Underwent thrombectomy and stent placement.  And per interventional cardiology's report looks like it was greater than 3 weeks old.    He is currently feeling well and is ready for discharge.    His hospitalization was complicated by lowish blood sugars on the day of discharge.  I  think this is likely due to not eating yesterday due to procedure, as well as not having access to his typical diet.  I discussed this with him in depth at the bedside, I have discharged him on his typical dose of glargine but I have recommended a lower dose should his blood sugars be less than 100.  He also knows to contact his clinic should his blood sugars start to become difficult to control.    #1 subacute MI: Came in with accelerating chest pain concerning for acute coronary syndrome prompting empiric heparin.  Ruled out by troponins but given description of symptoms there is high suspicion that this was representative of coronary artery disease.  Echocardiogram was completed showing no change in overall function.  He did undergo coronary angiogram on 7/8/2019 with results as noted below, which essentially did show a clot in the mid RCA, and significant cholesterol deposit in the distal RCA for which she underwent thrombectomy and stent placement.  His anticoagulation situation is that he is on chronic apixaban for stroke prophylaxis, and given recent drug-eluting stent placement dual antiplatelet therapy is indicated which puts him on triple anticoagulation.  In discussion with pharmacy and cardiology plans are to discharge with triple anticoagulant therapy for 5 days and then switch to combination of clopidogrel and apixaban.    #2 coronary artery disease: Secondary prevention with good blood pressure, diabetic,  And cholesterol control.    #3 chronic systolic congestive heart failure: EF is about the same at 35 to 40% by echo done on this hospitalization.  He continues on beta-blocker, ACE inhibitor and therapy for CAD.  Should be noted on presentation he was on furosemide 80 mg a day which is subsequently been decreased to 10 mg twice daily.  He remains on supplemental potassium at 10 mEq twice daily with normal potassium levels while in hospital    #4 type 2 diabetes: On insulin therapy.  He is on  pre-meal aspart 8 units before breakfast and lunch, and 10 units before supper.  He is also on a pre-meal insulin sliding scale.  And glargine 36 units every morning.  He had been tolerating this without difficulty then was n.p.o. on the day of his coronary angiogram which led to a low normal blood sugar on the morning of discharge.  Hemoglobin A1c is 8.8%.  I discussed this with him at discharge and he feels confident that he can manage his blood sugars as at home, I have given him instructions to only give 15 units of glargine in the morning should his blood sugar be less than 100.  He will need close follow-up with his primary care physician for further adjustments    #5 hypertension: Maintained on his typical carvedilol 25 mg twice daily, isosorbide 30 mg, and furosemide was decreased from 80 mg a day to 10 mg twice daily    #6  MARISOL:  Intolerant to CPAP    #7  AFib-rate controlled, on apixaban for stroke ppx    Code:  Full         Important Results:      As noted below         Pending Results:        Unresulted Labs Ordered in the Past 30 Days of this Admission     No orders found from 5/6/2019 to 7/6/2019.            Discharge Instructions and Follow-Up:      Follow-up Appointments     Follow-up and recommended labs and tests       F/u with PMD in 1-2 weeks for recheck of BP and BMP after increase in   lisinopril from 5 to 10 mg and to monitor blood sugars    Your blood sugar was on the low side on your day of discharge, I think   this has to do with having minimal intake yesterday, and so I do expect it   to rise when you get home and are eating your normal diet. But-if your am   blood sugar is < 100 I would only take 15 units of the lantus and call   your clinic to let them know.     F/U with Dr Tomlin of [facility]               Discharge Disposition:      Discharged to home         Discharge Medications:        Current Discharge Medication List      START taking these medications    Details    clopidogrel (PLAVIX) 75 MG tablet Take 1 tablet (75 mg) by mouth daily  Qty: 30 tablet, Refills: 0    Associated Diagnoses: Unstable angina (H)         CONTINUE these medications which have CHANGED    Details   aspirin 81 MG EC tablet Take 1 tablet (81 mg) by mouth daily for 5 days    Associated Diagnoses: Unstable angina (H)      furosemide (LASIX) 20 MG tablet Take 0.5 tablets (10 mg) by mouth 2 times daily  Qty: 30 tablet, Refills: 0    Associated Diagnoses: Unstable angina (H)      insulin glargine (LANTUS SOLOSTAR PEN) 100 UNIT/ML pen Inject 36 Units Subcutaneous every morning    Associated Diagnoses: Type 2 diabetes mellitus with hyperosmolarity without coma, with long-term current use of insulin (H)      lisinopril (PRINIVIL/ZESTRIL) 10 MG tablet Take 1 tablet (10 mg) by mouth daily  Qty: 30 tablet, Refills: 0    Associated Diagnoses: Acute on chronic systolic congestive heart failure (H); Chronic atrial fibrillation (H)         CONTINUE these medications which have NOT CHANGED    Details   apixaban ANTICOAGULANT (ELIQUIS) 5 MG tablet Take 1 tablet (5 mg) by mouth 2 times daily    Associated Diagnoses: Chronic atrial fibrillation (H)      atorvastatin (LIPITOR) 40 MG tablet Take 1 tablet (40 mg) by mouth every evening  Qty: 90 tablet, Refills: 3    Associated Diagnoses: Cerebrovascular accident (CVA) due to embolism of left posterior cerebral artery (H)      carvedilol (COREG) 25 MG tablet Take 1 tablet (25 mg) by mouth 2 times daily (with meals)  Qty: 180 tablet, Refills: 3    Associated Diagnoses: Acute on chronic systolic congestive heart failure (H); Hypertension goal BP (blood pressure) < 140/90      Cholecalciferol (VITAMIN D3) 2000 units TABS Take 1 tablet by mouth daily      !! insulin aspart (NOVOLOG FLEXPEN) 100 UNIT/ML pen Inject 8 Units Subcutaneous daily (with lunch)      !! insulin aspart (NOVOLOG FLEXPEN) 100 UNIT/ML pen Inject 10 Units Subcutaneous daily (with dinner)      !! insulin aspart  (NOVOLOG FLEXPEN) 100 UNIT/ML pen Inject Subcutaneous 3 times daily (with meals) Sliding Scale  Do not give sliding scale insulin if Pre-Meal BG less than 140.   For Pre-Meal:   - 200 give 2 units.    - 250 give 4 units.    - 300 give 6 units.    - 350 give 8 units.    - 400 give 10 units.      !! insulin aspart (NOVOLOG PEN) 100 UNIT/ML pen Inject 8 Units Subcutaneous daily (with breakfast)       isosorbide mononitrate (IMDUR) 30 MG 24 hr tablet Take 1 tablet (30 mg) by mouth daily  Qty: 90 tablet, Refills: 1    Associated Diagnoses: Hypertension goal BP (blood pressure) < 140/90      levothyroxine (SYNTHROID, LEVOTHROID) 137 MCG tablet Take 137 mcg by mouth daily   Qty: 90 tablet, Refills: 3      NONFORMULARY Topical patch for diabetes (blood glucose control) - changes every 4 days or so      pantoprazole (PROTONIX) 40 MG enteric coated tablet Take 1 tablet (40 mg) by mouth every morning (before breakfast)  Qty: 30 tablet, Refills: 0    Associated Diagnoses: GERD (gastroesophageal reflux disease)      potassium chloride ER (K-DUR/KLOR-CON M) 10 MEQ CR tablet Take 1 tablet (10 mEq) by mouth 2 times daily  Qty: 180 tablet, Refills: 0    Associated Diagnoses: Acute systolic heart failure (H)      insulin pen needle 31G X 6 MM Use  as directed.  Qty: 100 each, Refills: prn    Associated Diagnoses: Type 2 diabetes mellitus with diabetic peripheral angiopathy without gangrene, unspecified long term insulin use status      nitroglycerin (NITROSTAT) 0.4 MG sublingual tablet Place 1 tablet (0.4 mg) under the tongue every 5 minutes as needed for chest pain  Qty: 50 tablet, Refills: 0    Associated Diagnoses: Other chest pain      !! order for DME Equipment being ordered: Other: surgical shoe male XL  Treatment Diagnosis: sp right 2nd toe amputaion  Qty: 1 Device, Refills: 0    Associated Diagnoses: S/P amputation of lesser toe, right (H)      !! order for DME Equipment being ordered: Walker  "Wheels () and Walker ()  Treatment Diagnosis: Impaired gait, heel WB bilaterally.  Qty: 1 each, Refills: 0    Associated Diagnoses: Diabetic ulcer of left midfoot associated with diabetes mellitus of other type, unspecified ulcer stage (H)       !! - Potential duplicate medications found. Please discuss with provider.            Allergies:         Allergies   Allergen Reactions     No Known Allergies            Consultations This Hospital Stay:      Consultation during this admission received from cardiology         Condition and Physical on Discharge:      Discharge condition: Stable   Vitals: Blood pressure 110/71, pulse 87, temperature 98.3  F (36.8  C), temperature source Oral, resp. rate 10, height 1.93 m (6' 4\"), weight 106.2 kg (234 lb 2.1 oz), SpO2 99 %.     Constitutional: Pleasant nad looks stated age head nc/at sclera clear   Lungs: ctab nl effort   Cardiovascular: IIR no mrg no le edema   Abdomen: S/nt/nd   Skin: W/d no c/c   Other: W/d no c/c          Discharge Time:      Greater than 30 minutes.        Image Results From This Hospital Stay (For Non-[facility] Providers):      Coronary Angiogram 7/8/19  Conclusion          Left ventricular filling pressures are normal .    Prox LAD lesion is 20% stenosed.    Mid LAD lesion is 40% stenosed.    Prox Cx lesion is 10% stenosed.    3rd Mrg lesion is 5% stenosed.    Dist Cx lesion is 5% stenosed.    Mid RCA lesion is 100% stenosed.    Dist RCA lesion is 100% stenosed.     1. Prior lad and OM stents patent.   2. Extensive old Ant. MI  3. Thrombotic and atherosclerotic total obstruction of mid/distal RCA in retrospect vessel closed at least 3 weeks. Thrombectomy and direct infusion of adenosine/nipride/NTG into distal vessel   4. Stenting of mid to distal RCA with good result. Extensive calcium of mid RCA allow stents to open but with residual 10% narrowing in mid portions  (three stents in a train 3.5x16, 3.5x32, 3.0x32)     Echo 7/5/19    The left " ventricle is normal in size. Proximal septal thickening is noted.  Left ventricular systolic function is moderately reduced. The visual ejection  fraction is estimated at 35-40%. LVEF 36% based on biplane 2D tracing.  Diastolic function not assessed due to atrial fibrillation. There is akinesis  of the mid to apical anterior walls, the mid anteroseptal and apical septal  walls and the true LV apex. There is no thrombus seen in the left ventricle.  There is no thrombus seen in the left ventricle.  The right ventricle is normal size. Mildly decreased right ventricular  systolic function.  Trace to mild mitral and tricuspid regurgitation.  No pericardial effusion.  In comparison to the previous report dated 01/18/2019, the findings are  similar.        Results for orders placed or performed during the hospital encounter of 07/05/19   XR Chest 2 Views    Narrative    XR CHEST 2 VW 7/5/2019 1:44 PM    HISTORY: Pain.    COMPARISON: June 21, 2019.       Impression    IMPRESSION: The lungs are clear. No focal pulmonary opacities. No  pleural effusions or pneumothorax. Heart size is upper limits of  normal as before.     MAHI COLE MD   CT Head w/o Contrast    Narrative    CT SCAN OF THE HEAD WITHOUT CONTRAST   7/5/2019 1:53 PM     HISTORY: L>R arm wakness    TECHNIQUE:  Axial images of the head and coronal reformations without  IV contrast material. Radiation dose for this scan was reduced using  automated exposure control, adjustment of the mA and/or kV according  to patient size, or iterative reconstruction technique.    COMPARISON: Scan dated 9/6/2018    FINDINGS:     Intracranial contents: There is diffuse parenchymal volume loss.   White matter changes are present in the cerebral hemispheres that are  consistent with small vessel ischemic disease in this age patient.  There is a chronic area of encephalomalacia in the left occipital  region. This is unchanged. There is no evidence of intracranial  hemorrhage, mass,  acute infarct or anomaly.    Visualized orbits/sinuses/mastoids:  The visualized portions of the  sinuses and mastoids appear normal.    Osseous structures/soft tissues:  There is no evidence of trauma.      Impression    IMPRESSION:   1. No acute pathology is identified. No bleed, mass, or acute infarcts  are seen.  2. Chronic encephalomalacia in the left occipital lobe.      REINA PAEZ MD           Most Recent Lab Results In EPIC (For Non-EPIC Providers):    Most Recent 3 CBC's:  Recent Labs   Lab Test 07/09/19  0514 07/06/19  1015 07/05/19  1301   WBC 5.6 5.6 6.8   HGB 12.6* 12.1* 12.5*   MCV 88 88 88    226 246      Most Recent 3 BMP's:  Recent Labs   Lab Test 07/09/19  0514 07/08/19  0643 07/05/19  1301    138 135   POTASSIUM 3.9 3.8 4.4   CHLORIDE 107 102 104   CO2 29 29 24   BUN 16 20 17   CR 1.05 1.14 0.99   ANIONGAP 4 7 7   BETTIE 8.9 9.2 8.8   GLC 82 137* 240*     Most Recent 3 Troponin's:No lab results found.  Most Recent 3 INR's:  Recent Labs   Lab Test 05/05/19  0218 01/20/19  0600 01/19/19  0544   INR 1.34* 1.43* 1.40*     Most Recent 2 LFT's:  Recent Labs   Lab Test 01/17/19  1652 01/13/19  0702   AST 10 10   ALT 17 15   ALKPHOS 77 66   BILITOTAL 1.2 0.5     Most Recent Cholesterol Panel:  Recent Labs   Lab Test 07/08/19  0643   CHOL 106   LDL 43   HDL 46   TRIG 84     Most Recent 6 Bacteria Isolates From Any Culture (See EPIC Reports for Culture Details):  Recent Labs   Lab Test 05/05/19  0437 05/05/19  0219 03/12/19  1642 01/17/19  1903 01/06/19  1436 01/05/19  1708   CULT No growth No growth Heavy growth  Anaerococcus species  No further identification  Susceptibility testing not routinely done  *  Heavy growth  Anaerobic gram positive cocci  No further identification  Susceptibility testing not routinely done  *  Heavy growth  Staphylococcus aureus  *  Light growth  Enterococcus faecalis  * No growth Light growth  Peptoniphilus asaccharolyticus  Susceptibility testing not routinely  done  *  Light growth  Clostridium species, not perfringens  Susceptibility testing not routinely done  *  Heavy growth  Finegoldia magna (Peptostreptococcus jennifer)  Susceptibility testing not routinely done  *  Heavy growth  Streptococcus dysgalactiae serogroup C/G  *  Moderate growth  Staphylococcus lugdunensis  *  Light growth  Streptococcus dysgalactiae serogroup C/G  Susceptibility testing done on previous specimen  *  Single colony  Coagulase negative Staphylococcus  Susceptibility testing not routinely done  These bacteria are part of normal skin micahel, but on occasion, may be true pathogens.    Clinical correlation must be applied to interpreting this microbiology result.  * No growth     Most Recent TSH, T4 and HgbA1c:   Recent Labs   Lab Test 07/06/19  1015  09/06/18  1542   TSH  --   --  0.40   A1C 8.8*   < > 9.4*    < > = values in this interval not displayed.

## 2019-07-09 NOTE — PROGRESS NOTES
[facility]    Cardiology Progress Note    Date of Service (when I saw the patient): 07/09/2019     Primary cardiology team: Dr. Tomlin/ Gill JAVIER    Assessment & Plan   Jayro Elder is a 69 year old male who was admitted on 7/5/2019 with CP/nausea/diaphoresis concerning for unstable angina. His hx includes an ischemic cardiomyopathy in the 35-40% range, status post PCI to LAD after an MI in 2005, he also had intervention of the LAD in 2009 and 2013 and most recently to the obtuse marginal in June 2017, chronic persistent atrial fibrillation, history of CVA, and poorly controlled diabetes.    1. Exertional chest discomfort with associated nausea/diaphoresis/shortness of breath.  Consistent with accelerating/unstable angina.  2. Coronary artery disease status post multiple previous percutaneous interventions to the LAD, more recent stenting of the third obtuse marginal in June 2017.  Now s/p 3 SUSAN to the RCA 7/8/19.    3. Ischemic cardiomyopathy with an LVEF of 35 to 40%, stable on TTE this stay.  LV filling pressures normal on heart cath 7/8/19.   4. Chronic persistent atrial fibrillation.  On Coreg with rates controlled.  On Eliquis prior to admission.   5. History of CVA in the past.  6. Poorly controlled diabetes.  7. HTN. Pt reports BP has typically well controlled, but has been running higher the past couple weeks.     PLAN:   - Continue asa 81 mg x 5 days (through 7/13/19), Eliquis and Plavix through 1 year.  After that will need discussion with Dr. Tomlin re long term Eliquis + Plavix vs Eliquis + ASA  - Continue atorvastatin 40 mg, Coreg 25 mg BID, Imdur 30 mg daily, lisinopril 10 mg (increased).  May need further titration of BP meds in follow up.  - Not volume overloaded, continue furosemide 10 mg  - Outpatient cardiac rehab.       Jayro already has follow up with Gill JAVIER in the [facility] scheduled for 7/15/19.  Will need BMP.       Tierney Mayer PA-C    Interval History    Mild chest pain, but much improved.  He walked the hallway and felt the best he has in some time.     Tele: Afib, no other concerning findings noted     Physical Exam   Temp: 97.9  F (36.6  C) Temp src: Oral BP: 131/88 Pulse: 115 Heart Rate: 96 Resp: 18 SpO2: 92 % O2 Device: None (Room air) Oxygen Delivery: 3 LPM  Vitals:    07/07/19 0605 07/08/19 0630 07/08/19 1715   Weight: 106.8 kg (235 lb 6.4 oz) 106.7 kg (235 lb 3.2 oz) 106.2 kg (234 lb 2.1 oz)     Vital Signs with Ranges  Temp:  [96.9  F (36.1  C)-97.9  F (36.6  C)] 97.9  F (36.6  C)  Pulse:  [] 115  Heart Rate:  [] 96  Resp:  [7-29] 18  BP: (121-158)/() 131/88  SpO2:  [92 %-100 %] 92 %  I/O last 3 completed shifts:  In: 525 [P.O.:100; I.V.:425]  Out: 925 [Urine:925]    Constitutional     alert and oriented, in no acute distress.     Skin     warm and dry to touch    Lungs  clear to auscultation     Cardiac  irregular rhythm, S1 normal, S2 normal, no murmurs, no rubs    Extremities and Back     No edema observed.  RFA sit looks goot - no hematoma, no ecchymosis       Neurological     no gross motor deficits noted, affect appropriate, oriented to time, person and place.      Medications     Continuing ACE inhibitor/ARB/ARNI from home medication list OR ACE inhibitor/ARB order already placed during this visit       - MEDICATION INSTRUCTIONS -       - MEDICATION INSTRUCTIONS -       - MEDICATION INSTRUCTIONS -       - MEDICATION INSTRUCTIONS -       - MEDICATION INSTRUCTIONS -       - MEDICATION INSTRUCTIONS -         apixaban ANTICOAGULANT  5 mg Oral BID    Followed by     [START ON 7/16/2019] apixaban ANTICOAGULANT  5 mg Oral BID     aspirin  81 mg Oral Daily     atorvastatin  40 mg Oral QPM     carvedilol  25 mg Oral BID w/meals     clopidogrel  75 mg Oral Daily     furosemide  10 mg Oral BID     insulin aspart  4 Units Subcutaneous TID w/meals     insulin aspart  1-7 Units Subcutaneous TID AC     insulin aspart  1-5 Units Subcutaneous At  Bedtime     insulin glargine  15 Units Subcutaneous QAM     isosorbide mononitrate  30 mg Oral Daily     levothyroxine  137 mcg Oral Daily     lisinopril  10 mg Oral Daily     pantoprazole  40 mg Oral QAM AC     potassium chloride ER  10 mEq Oral BID     sodium chloride (PF)  3 mL Intracatheter Q8H       Data   Most Recent 3 CBC's:  Recent Labs   Lab Test 07/09/19  0514 07/06/19  1015 07/05/19  1301   WBC 5.6 5.6 6.8   HGB 12.6* 12.1* 12.5*   MCV 88 88 88    226 246     Most Recent 3 BMP's:  Recent Labs   Lab Test 07/09/19  0514 07/08/19  0643 07/05/19  1301    138 135   POTASSIUM 3.9 3.8 4.4   CHLORIDE 107 102 104   CO2 29 29 24   BUN 16 20 17   CR 1.05 1.14 0.99   ANIONGAP 4 7 7   BETTIE 8.9 9.2 8.8   GLC 82 137* 240*     Most Recent 3 Troponin's:  Recent Labs   Lab Test 07/05/19  2051 07/05/19  1702 07/05/19  1301   TROPI <0.015 <0.015 <0.015     Most Recent Cholesterol Panel:  Recent Labs   Lab Test 07/08/19  0643   CHOL 106   LDL 43   HDL 46   TRIG 84     Most Recent TSH and T4:  Recent Labs   Lab Test 09/06/18  1542   TSH 0.40     Most Recent Hemoglobin A1c:  Recent Labs   Lab Test 07/06/19  1015   A1C 8.8*       Echocardiology:   Performed during this admission        Results:    The left ventricle is normal in size. Proximal septal thickening is noted.  Left ventricular systolic function is moderately reduced. The visual ejection  fraction is estimated at 35-40%. LVEF 36% based on biplane 2D tracing.  Diastolic function not assessed due to atrial fibrillation. There is akinesis  of the mid to apical anterior walls, the mid anteroseptal and apical septal  walls and the true LV apex. There is no thrombus seen in the left ventricle.  There is no thrombus seen in the left ventricle.  The right ventricle is normal size. Mildly decreased right ventricular  systolic function.  Trace to mild mitral and tricuspid regurgitation.  No pericardial effusion.  In comparison to the previous report dated  01/18/2019, the findings are  similar.

## 2019-07-09 NOTE — PROGRESS NOTES
07/09/19 0746   Quick Adds   Type of Visit Initial Occupational Therapy Evaluation   Living Environment   Lives With spouse;grandchild(ernesto)   Living Arrangements house   Home Accessibility stairs to enter home   Number of Stairs, Main Entrance 1   Transportation Anticipated car, drives self;family or friend will provide   Living Environment Comment Pt lives with spouse and grandchildren (5&10 yo) in house, 1 step to enter, stair lift to 2nd floor bedroom, walk in shower, RTS   Self-Care   Usual Activity Tolerance moderate   Current Activity Tolerance fair   Functional Level   Ambulation 0-->independent   Transferring 0-->independent   Toileting 0-->independent   Bathing 0-->independent   Dressing 0-->independent   Eating 0-->independent   Communication 0-->understands/communicates without difficulty   Swallowing 0-->swallows foods/liquids without difficulty   Cognition 0 - no cognition issues reported   Fall history within last six months no   Prior Functional Level Comment Pt reports indep in all ADLs, IADls and mobility tasks with no AD at baseline. Spouse works outside of the home M-F, pt watches grandchildren. Pt is no longer able to drive due to visual deficits.    General Information   Onset of Illness/Injury or Date of Surgery - Date 07/05/19   Referring Physician Jose Alfredo Crockett MD   Patient/Family Goals Statement Pt's goal is to d/c home   Additional Occupational Profile Info/Pertinent History of Current Problem Per chart: Pt is a 69 year old male admitted due to chest pain, nausea, diaphoresis, and SOB concerning for unstable angina; found to have old anterior MI, now s/p PCI w/ SUSAN.    Precautions/Limitations fall precautions;other (see comments)   Cognitive Status Examination   Orientation orientation to person, place and time   Visual Perception   Visual Perception Comments Pt reports baseline R visual field deficit from CVA   Sensory Examination   Sensory Comments Pt reports baseline  numbness/tingling in BUEs/BLES   Pain Assessment   Patient Currently in Pain No   Range of Motion (ROM)   ROM Comment WFL   Strength   Strength Comments WFL   Hand Strength   Hand Strength Comments WFL   Bed Mobility Skill: Sit to Supine   Level of [place]: Sit/Supine stand-by assist   Physical Assist/Nonphysical Assist: Sit/Supine 1 person assist   Bed Mobility Skill: Supine to Sit   Level of [place]: Supine/Sit stand-by assist   Physical Assist/Nonphysical Assist: Supine/Sit 1 person assist   Transfer Skills   Transfer Comments SBA provided during transfers/mobility   Transfer Skill: Bed to Chair/Chair to Bed   Level of [place]: Bed to Chair stand-by assist   Physical Assist/Nonphysical Assist: Bed to Chair 1 person assist   Transfer Skill: Sit to Stand   Level of [place]: Sit/Stand stand-by assist   Physical Assist/Nonphysical Assist: Sit/Stand 1 person assist   Transfer Skill: Toilet Transfer   Level of [place]: Toilet stand-by assist   Physical Assist/Nonphysical Assist: Toilet 1 person assist   Upper Body Dressing   Level of [place]: Dress Upper Body stand-by assist   Physical Assist/Nonphysical Assist: Dress Upper Body 1 person assist   Lower Body Dressing   Level of [place]: Dress Lower Body stand-by assist   Physical Assist/Nonphysical Assist: Dress Lower Body 1 person assist   Toileting   Level of [place]: Toilet stand-by assist   Physical Assist/Nonphysical Assist: Toilet 1 person assist   Grooming   Level of [place]: Grooming stand-by assist   Physical Assist/Nonphysical Assist: Grooming 1 person assist   Instrumental Activities of Daily Living (IADL)   Previous Responsibilities meal prep;housekeeping;medication management   Activities of Daily Living Analysis   Impairments Contributing to Impaired Activities of Daily Living strength decreased   General Therapy Interventions   Planned Therapy Interventions ADL retraining;strengthening;transfer training;home  "program guidelines;progressive activity/exercise;risk factor education   Clinical Impression   Criteria for Skilled Therapeutic Interventions Met yes, treatment indicated   OT Diagnosis Impaired ADLS, IADLs and mobility tasks   Influenced by the following impairments Decreased functional activity tolerance   Assessment of Occupational Performance 1-3 Performance Deficits   Identified Performance Deficits ADLs, IADLs, mobility   Clinical Decision Making (Complexity) Low complexity   Therapy Frequency Daily   Predicted Duration of Therapy Intervention (days/wks) 3 days   Anticipated Discharge Disposition Home with Assist;Home with Outpatient Therapy   Risks and Benefits of Treatment have been explained. Yes   Patient, Family & other staff in agreement with plan of care Yes   Clinical Impression Comments Pt would benefit from skilled OT/CR to maximize CV strength through monitored exercise and education.    [facility] AM-PAC  \"6 Clicks\" Daily Activity Inpatient Short Form   1. Putting on and taking off regular lower body clothing? 3 - A Little   2. Bathing (including washing, rinsing, drying)? 3 - A Little   3. Toileting, which includes using toilet, bedpan or urinal? 4 - None   4. Putting on and taking off regular upper body clothing? 4 - None   5. Taking care of personal grooming such as brushing teeth? 4 - None   6. Eating meals? 4 - None   Daily Activity Raw Score (Score out of 24.Lower scores equate to lower levels of function) 22   Total Evaluation Time   Total Evaluation Time (Minutes) 10     "

## 2019-07-09 NOTE — PHARMACY - DISCHARGE MEDICATION RECONCILIATION AND EDUCATION
Discharge medication review for this patient is complete. Face-to-face medication education was provided by the pharmacist.  See Psychiatric for allergy information and immunization status.   Pharmacist assisted with medication reconciliation of discharge medications with PTA medications.    Patient was educated on the following for each discharge medication:   Rationale for therapy  Duration of treatment  Dosing and or monitoring drug levels  Common side effects  Importance of compliance  Drug/food interactions  Missed doses  Self monitoring parameters    Left written materials and instructions (Clinical notes from [facility]) for new medications: No    OUTCOMES: Patient verbalized understanding    IMPORTANT FOLLOW UP NOTES:   -Pt will monitor for bruising and bleeding as on triple therapy:asa,plavix,apixaban    Discharge Medication List     Review of your medicines      START taking      Dose / Directions   clopidogrel 75 MG tablet  Commonly known as:  PLAVIX      Dose:  75 mg  Start taking on:  7/10/2019  Take 1 tablet (75 mg) by mouth daily  Quantity:  30 tablet  Refills:  0        CONTINUE these medicines which may have CHANGED, or have new prescriptions. If we are uncertain of the size of tablets/capsules you have at home, strength may be listed as something that might have changed.      Dose / Directions   furosemide 20 MG tablet  Commonly known as:  LASIX  This may have changed:      medication strength    how much to take    when to take this      Dose:  10 mg  Take 0.5 tablets (10 mg) by mouth 2 times daily  Quantity:  30 tablet  Refills:  0     lisinopril 10 MG tablet  Commonly known as:  PRINIVIL/ZESTRIL  This may have changed:      medication strength    how much to take  Used for:  Acute on chronic systolic congestive heart failure (H), Chronic atrial fibrillation (H)      Dose:  10 mg  Take 1 tablet (10 mg) by mouth daily  Quantity:  30 tablet  Refills:  0        CONTINUE these medicines which have NOT CHANGED       Dose / Directions   apixaban ANTICOAGULANT 5 MG tablet  Commonly known as:  ELIQUIS  Used for:  Chronic atrial fibrillation (H)      Dose:  5 mg  Take 1 tablet (5 mg) by mouth 2 times daily  Refills:  0     aspirin 81 MG EC tablet      Dose:  81 mg  Take 1 tablet (81 mg) by mouth daily for 5 days  Refills:  0     atorvastatin 40 MG tablet  Commonly known as:  LIPITOR  Used for:  Cerebrovascular accident (CVA) due to embolism of left posterior cerebral artery (H)      Dose:  40 mg  Take 1 tablet (40 mg) by mouth every evening  Quantity:  90 tablet  Refills:  3     carvedilol 25 MG tablet  Commonly known as:  COREG  Used for:  Acute on chronic systolic congestive heart failure (H), Hypertension goal BP (blood pressure) < 140/90      Dose:  25 mg  Take 1 tablet (25 mg) by mouth 2 times daily (with meals)  Quantity:  180 tablet  Refills:  3     insulin glargine 100 UNIT/ML pen  Commonly known as:  LANTUS PEN      Dose:  36 Units  Inject 36 Units Subcutaneous every morning  Refills:  0     insulin pen needle 31G X 6 MM miscellaneous  Commonly known as:  31G X 6 MM  Used for:  Type 2 diabetes mellitus with diabetic peripheral angiopathy without gangrene, unspecified long term insulin use status      Use  as directed.  Quantity:  100 each  Refills:  prn     isosorbide mononitrate 30 MG 24 hr tablet  Commonly known as:  IMDUR  Used for:  Hypertension goal BP (blood pressure) < 140/90      Dose:  30 mg  Take 1 tablet (30 mg) by mouth daily  Quantity:  90 tablet  Refills:  1     levothyroxine 137 MCG tablet  Commonly known as:  SYNTHROID/LEVOTHROID      Dose:  137 mcg  Take 137 mcg by mouth daily  Quantity:  90 tablet  Refills:  3     nitroGLYcerin 0.4 MG sublingual tablet  Commonly known as:  NITROSTAT  Used for:  Other chest pain      Dose:  0.4 mg  Place 1 tablet (0.4 mg) under the tongue every 5 minutes as needed for chest pain  Quantity:  50 tablet  Refills:  0     NONFORMULARY      Topical patch for diabetes (blood  glucose control) - changes every 4 days or so  Refills:  0     * NovoLOG FLEXPEN 100 UNIT/ML pen  Generic drug:  insulin aspart      Inject Subcutaneous 3 times daily (with meals) Sliding Scale  Do not give sliding scale insulin if Pre-Meal BG less than 140.   For Pre-Meal:   - 200 give 2 units.    - 250 give 4 units.    - 300 give 6 units.    - 350 give 8 units.    - 400 give 10 units.  Refills:  0     * insulin aspart 100 UNIT/ML pen  Commonly known as:  NovoLOG PEN      Dose:  8 Units  Inject 8 Units Subcutaneous daily (with breakfast)  Refills:  0     * NovoLOG FLEXPEN 100 UNIT/ML pen  Generic drug:  insulin aspart      Dose:  8 Units  Inject 8 Units Subcutaneous daily (with lunch)  Refills:  0     * NovoLOG FLEXPEN 100 UNIT/ML pen  Generic drug:  insulin aspart      Dose:  10 Units  Inject 10 Units Subcutaneous daily (with dinner)  Refills:  0     order for DME  Used for:  Diabetic ulcer of left midfoot associated with diabetes mellitus of other type, unspecified ulcer stage (H)      Equipment being ordered: Walker Wheels () and Walker ()  Treatment Diagnosis: Impaired gait, heel WB bilaterally.  Quantity:  1 each  Refills:  0     order for DME  Used for:  S/P amputation of lesser toe, right (H)      Equipment being ordered: Other: surgical shoe male XL  Treatment Diagnosis: sp right 2nd toe amputaion  Quantity:  1 Device  Refills:  0     pantoprazole 40 MG EC tablet  Commonly known as:  PROTONIX  Used for:  GERD (gastroesophageal reflux disease)      Dose:  40 mg  Take 1 tablet (40 mg) by mouth every morning (before breakfast)  Quantity:  30 tablet  Refills:  0     potassium chloride ER 10 MEQ CR tablet  Commonly known as:  K-DUR/KLOR-CON M  Used for:  Acute systolic heart failure (H)      Dose:  10 mEq  Take 1 tablet (10 mEq) by mouth 2 times daily  Quantity:  180 tablet  Refills:  0     Vitamin D3 2000 units Tabs      Dose:  1 tablet  Take 1 tablet by mouth  daily  Refills:  0         * This list has 4 medication(s) that are the same as other medications prescribed for you. Read the directions carefully, and ask your doctor or other care provider to review them with you.               Where to get your medicines      These medications were sent to [facility] #[id number] - [place] - [address] [address]  [address] [address] [address]    Phone:  [phone]     clopidogrel 75 MG tablet    furosemide 20 MG tablet    lisinopril 10 MG tablet

## 2019-07-10 ENCOUNTER — TELEPHONE (OUTPATIENT)
Dept: FAMILY MEDICINE | Facility: CLINIC | Age: 69
End: 2019-07-10

## 2019-07-10 ENCOUNTER — TELEPHONE (OUTPATIENT)
Dept: CARDIOLOGY | Facility: CLINIC | Age: 69
End: 2019-07-10

## 2019-07-10 LAB — INTERPRETATION ECG - MUSE: NORMAL

## 2019-07-10 NOTE — TELEPHONE ENCOUNTER
Called the pt.  He says he is doing ok.  He is seeing Cardiology Monday.  Offered to help the pt set up an appt here or asked if he is seeing another primary provider as per discharge instructions, he is to f/u with primary provider and have bp assessed and lab draw.  Pt states he does not have a primary and he has to be able to get here.  He states he does not drive.  Asked if he wanted to see if someone could drive him and then call us back and we can help him.  Pt states yes.

## 2019-07-10 NOTE — PROGRESS NOTES
Transition Communication Hand-off for Care Transitions to Next Level of Care Provider    Name: Jayro Elder  : 1950  MRN #: [id number]  Primary Care Provider: Physician No Ref-Primary     Primary Clinic: No address on file  Primary Care Clinic Name: (is not currently established in clinic)  Reason for Hospitalization:  Bilateral arm weakness [R29.898]  Chest pain, unspecified type [R07.9]  Admit Date/Time: 2019 12:45 PM  Discharge Date: 19  Payor Source: Payor: HU[employer]NA / Plan: HUMANA MEDICARE ADVANTAGE / Product Type: Medicare /     Readmission Assessment Measure (ANDRIA) Risk Score/category: HIGH           Reason for Communication Hand-off Referral: Fragility    Discharge Plan:       Concern for non-adherence with plan of care:   NO  Discharge Needs Assessment:      Already enrolled in Tele-monitoring program and name of program:  na  Follow-up specialty is recommended: Yes    Follow-up plan:    Future Appointments   Date Time Provider Department Center   7/15/2019  9:10 AM [place] LAB [facility]   [facility]2019 10:10 [facility]   [facility]  8[facility]15 [facility]   2019  8:00 AM Татьяна Mckeon DPM, Podiatry/Foot and Ankle Surgery [facility]       Any outstanding tests or procedures:        Referrals     Future Labs/Procedures    CARDIAC REHAB REFERRAL     Process Instructions:    Advance to Wellness and Exercise for Life (WEL) Program or to another maintenance exercise program upon completion of phase 2 cardiac rehab.    Weight mgt program is only offered at [facility].    *This therapy referral will be filtered to a centralized scheduling office at [facility] and the patient will receive a call to schedule an appointment at a [facility] location most convenient for them. *        Comments:    Patient may choose their preference of the site for Cardiac Rehab.      Medication Therapy Management Referral     Process  Instructions:        This referral will be filtered to a centralized scheduling office at [facility] and the patient will receive a call to schedule an appointment at a [facility] location most convenient for them.    Comments:    MTM referral reason            Patient had a hospital or ED visit in last 6 months and has more than 10   PTA or Discharge medications    Patient has 5 PTA or Discharge Medications AND one of the following   diagnoses: DM,HF,COPD,AMI DX,PULM HTN       This service is designed to help you get the most from your medications.  A specially trained pharmacist will work closely with you and your doctors  to solve any problems related to your medications and to help you get the   best results from taking them.      The Medication Therapy Management staff will call you to schedule an appointment.                   Follow-up and recommended labs and tests     F/u with PMD in 1-2 weeks for recheck of BP and BMP after increase in lisinopril from 5 to 10 mg and to monitor blood sugars     Your blood sugar was on the low side on your day of discharge, I think this has to do with having minimal intake yesterday, and so I do expect it to rise when you get home and are eating your normal diet. But-if your am blood sugar is < 100 I would only take 15 units of the lantus and call your clinic to let them know.     F/U with Dr Tomlin of [facility] Card[facility]               Key Recommendations:  CTS identifies pt as high risk due to Elevated ANDRIA. Patient admitted on 7/5/19 with chest pain, cardiology consulted and following. Plan is for a coronary angiography. Patient has a history of diabetes on insulin, Afib on Eliquis, cardiomyopathy, HTN, and hypothyroidism. Patient follows with podiatry and cardiology [facility] outpatient specialty. Patient has had 3 hospitalizations within the last 6 months, in January for CHF exacerbation, in March for cellulitis and osteomyelitis, and in May for  cellulitis.   Patient is currently not established with primary care. Patient did not want any resources at this time. Patient states that transportation is difficult as he relies on his wife for transportation for appointments and shopping. Patient was resistant to considering other transportation options. Patient does not have any services currently and is not interested in home care.   AVS discharge instructions were updated for the pt to schedule an appointment to establish care in a primary care clinic.     Recommendations at discharge include to follow-up with cards.      Miracle Salvador    AVS/Discharge Summary is the source of truth; this is a helpful guide for improved communication of patient story   Sena Alvarado(Attending)

## 2019-07-10 NOTE — TELEPHONE ENCOUNTER
Called pt - post hospital for nSTEMI, cardiomyopathy  Pt is aware to take clopidogrel, ASA and apixaban (Hx of Afib, DVT) for 5 days.  Will stop ASA on Saturday 07/13/2019 but continue clopidogrel and apixaban  Pt denies any bleeding.  No other concerns  Right groin site dry and intact.  Will follow up labs and OV 07/15/2019 Zully JAVIER

## 2019-07-10 NOTE — TELEPHONE ENCOUNTER
Please contact patient for In-patient follow up.  There are no phone numbers on file.    Visit date: 7/9/19  Diagnosis listed:Unstable Angina (H), Chest Pain, Unspecified Type  Number of visits in past 12 months:0/5

## 2019-07-14 ENCOUNTER — HOSPITAL ENCOUNTER (OUTPATIENT)
Facility: CLINIC | Age: 69
Setting detail: OBSERVATION
Discharge: HOME | End: 2019-07-15
Attending: EMERGENCY MEDICINE | Admitting: INTERNAL MEDICINE
Payer: COMMERCIAL

## 2019-07-14 ENCOUNTER — APPOINTMENT (OUTPATIENT)
Dept: GENERAL RADIOLOGY | Facility: CLINIC | Age: 69
End: 2019-07-14
Attending: EMERGENCY MEDICINE
Payer: COMMERCIAL

## 2019-07-14 DIAGNOSIS — I50.23 ACUTE ON CHRONIC SYSTOLIC CONGESTIVE HEART FAILURE (H): ICD-10-CM

## 2019-07-14 DIAGNOSIS — I10 ESSENTIAL HYPERTENSION: Primary | ICD-10-CM

## 2019-07-14 DIAGNOSIS — R79.89 ELEVATED TROPONIN: ICD-10-CM

## 2019-07-14 DIAGNOSIS — I10 HYPERTENSION, UNSPECIFIED TYPE: ICD-10-CM

## 2019-07-14 DIAGNOSIS — R07.9 ACUTE CHEST PAIN: ICD-10-CM

## 2019-07-14 DIAGNOSIS — I48.20 CHRONIC ATRIAL FIBRILLATION (H): ICD-10-CM

## 2019-07-14 DIAGNOSIS — I25.119 CORONARY ARTERY DISEASE INVOLVING NATIVE CORONARY ARTERY OF NATIVE HEART WITH ANGINA PECTORIS (H): ICD-10-CM

## 2019-07-14 LAB
ANION GAP SERPL CALCULATED.3IONS-SCNC: 7 MMOL/L (ref 3–14)
BASOPHILS # BLD AUTO: 0 10E9/L (ref 0–0.2)
BASOPHILS NFR BLD AUTO: 0.3 %
BUN SERPL-MCNC: 21 MG/DL (ref 7–30)
CALCIUM SERPL-MCNC: 8.5 MG/DL (ref 8.5–10.1)
CHLORIDE SERPL-SCNC: 105 MMOL/L (ref 94–109)
CO2 SERPL-SCNC: 26 MMOL/L (ref 20–32)
CREAT SERPL-MCNC: 1.09 MG/DL (ref 0.66–1.25)
DIFFERENTIAL METHOD BLD: ABNORMAL
EOSINOPHIL # BLD AUTO: 0.3 10E9/L (ref 0–0.7)
EOSINOPHIL NFR BLD AUTO: 4.6 %
ERYTHROCYTE [DISTWIDTH] IN BLOOD BY AUTOMATED COUNT: 13.2 % (ref 10–15)
GFR SERPL CREATININE-BSD FRML MDRD: 69 ML/MIN/{1.73_M2}
GLUCOSE SERPL-MCNC: 225 MG/DL (ref 70–99)
HCT VFR BLD AUTO: 34.8 % (ref 40–53)
HGB BLD-MCNC: 11.3 G/DL (ref 13.3–17.7)
IMM GRANULOCYTES # BLD: 0 10E9/L (ref 0–0.4)
IMM GRANULOCYTES NFR BLD: 0.2 %
LYMPHOCYTES # BLD AUTO: 1.3 10E9/L (ref 0.8–5.3)
LYMPHOCYTES NFR BLD AUTO: 21.2 %
MCH RBC QN AUTO: 28.6 PG (ref 26.5–33)
MCHC RBC AUTO-ENTMCNC: 32.5 G/DL (ref 31.5–36.5)
MCV RBC AUTO: 88 FL (ref 78–100)
MONOCYTES # BLD AUTO: 0.6 10E9/L (ref 0–1.3)
MONOCYTES NFR BLD AUTO: 10.1 %
NEUTROPHILS # BLD AUTO: 3.8 10E9/L (ref 1.6–8.3)
NEUTROPHILS NFR BLD AUTO: 63.6 %
NRBC # BLD AUTO: 0 10*3/UL
NRBC BLD AUTO-RTO: 0 /100
PLATELET # BLD AUTO: 220 10E9/L (ref 150–450)
POTASSIUM SERPL-SCNC: 3.8 MMOL/L (ref 3.4–5.3)
RBC # BLD AUTO: 3.95 10E12/L (ref 4.4–5.9)
SODIUM SERPL-SCNC: 138 MMOL/L (ref 133–144)
TROPONIN I SERPL-MCNC: 0.12 UG/L (ref 0–0.04)
TROPONIN I SERPL-MCNC: 0.12 UG/L (ref 0–0.04)
WBC # BLD AUTO: 6 10E9/L (ref 4–11)

## 2019-07-14 PROCEDURE — G0378 HOSPITAL OBSERVATION PER HR: HCPCS

## 2019-07-14 PROCEDURE — 99285 EMERGENCY DEPT VISIT HI MDM: CPT | Mod: 25

## 2019-07-14 PROCEDURE — 71046 X-RAY EXAM CHEST 2 VIEWS: CPT

## 2019-07-14 PROCEDURE — 25000132 ZZH RX MED GY IP 250 OP 250 PS 637: Performed by: EMERGENCY MEDICINE

## 2019-07-14 PROCEDURE — 80048 BASIC METABOLIC PNL TOTAL CA: CPT | Performed by: EMERGENCY MEDICINE

## 2019-07-14 PROCEDURE — 99220 ZZC INITIAL OBSERVATION CARE,LEVL III: CPT | Performed by: INTERNAL MEDICINE

## 2019-07-14 PROCEDURE — 85025 COMPLETE CBC W/AUTO DIFF WBC: CPT | Performed by: EMERGENCY MEDICINE

## 2019-07-14 PROCEDURE — 84484 ASSAY OF TROPONIN QUANT: CPT | Mod: 91 | Performed by: EMERGENCY MEDICINE

## 2019-07-14 PROCEDURE — 93005 ELECTROCARDIOGRAM TRACING: CPT | Mod: 76

## 2019-07-14 PROCEDURE — 93005 ELECTROCARDIOGRAM TRACING: CPT

## 2019-07-14 RX ORDER — CARVEDILOL 25 MG/1
25 TABLET ORAL ONCE
Status: COMPLETED | OUTPATIENT
Start: 2019-07-14 | End: 2019-07-14

## 2019-07-14 RX ORDER — NITROGLYCERIN 0.4 MG/1
0.4 TABLET SUBLINGUAL ONCE
Status: COMPLETED | OUTPATIENT
Start: 2019-07-14 | End: 2019-07-14

## 2019-07-14 RX ADMIN — NITROGLYCERIN 0.4 MG: 0.4 TABLET SUBLINGUAL at 20:50

## 2019-07-14 RX ADMIN — APIXABAN 5 MG: 5 TABLET, FILM COATED ORAL at 21:54

## 2019-07-14 RX ADMIN — CARVEDILOL 25 MG: 25 TABLET, FILM COATED ORAL at 21:54

## 2019-07-14 ASSESSMENT — ENCOUNTER SYMPTOMS
NECK PAIN: 1
FEVER: 0
SHORTNESS OF BREATH: 1

## 2019-07-14 ASSESSMENT — MIFFLIN-ST. JEOR: SCORE: 1974.64

## 2019-07-15 ENCOUNTER — APPOINTMENT (OUTPATIENT)
Dept: NUCLEAR MEDICINE | Facility: CLINIC | Age: 69
End: 2019-07-15
Attending: INTERNAL MEDICINE
Payer: COMMERCIAL

## 2019-07-15 VITALS
HEART RATE: 75 BPM | BODY MASS INDEX: 29.75 KG/M2 | SYSTOLIC BLOOD PRESSURE: 136 MMHG | DIASTOLIC BLOOD PRESSURE: 89 MMHG | HEIGHT: 76 IN | WEIGHT: 244.3 LBS | RESPIRATION RATE: 16 BRPM | TEMPERATURE: 97.1 F | OXYGEN SATURATION: 97 %

## 2019-07-15 PROBLEM — I10 HYPERTENSION: Status: ACTIVE | Noted: 2019-07-15

## 2019-07-15 LAB
ANION GAP SERPL CALCULATED.3IONS-SCNC: 7 MMOL/L (ref 3–14)
BUN SERPL-MCNC: 19 MG/DL (ref 7–30)
CALCIUM SERPL-MCNC: 8.6 MG/DL (ref 8.5–10.1)
CHLORIDE SERPL-SCNC: 108 MMOL/L (ref 94–109)
CO2 SERPL-SCNC: 26 MMOL/L (ref 20–32)
CREAT SERPL-MCNC: 0.99 MG/DL (ref 0.66–1.25)
ERYTHROCYTE [DISTWIDTH] IN BLOOD BY AUTOMATED COUNT: 13.2 % (ref 10–15)
GFR SERPL CREATININE-BSD FRML MDRD: 78 ML/MIN/{1.73_M2}
GLUCOSE BLDC GLUCOMTR-MCNC: 113 MG/DL (ref 70–99)
GLUCOSE BLDC GLUCOMTR-MCNC: 150 MG/DL (ref 70–99)
GLUCOSE BLDC GLUCOMTR-MCNC: 176 MG/DL (ref 70–99)
GLUCOSE SERPL-MCNC: 119 MG/DL (ref 70–99)
HCT VFR BLD AUTO: 32.8 % (ref 40–53)
HGB BLD-MCNC: 10.7 G/DL (ref 13.3–17.7)
INTERPRETATION ECG - MUSE: NORMAL
INTERPRETATION ECG - MUSE: NORMAL
MCH RBC QN AUTO: 28.6 PG (ref 26.5–33)
MCHC RBC AUTO-ENTMCNC: 32.6 G/DL (ref 31.5–36.5)
MCV RBC AUTO: 88 FL (ref 78–100)
PLATELET # BLD AUTO: 199 10E9/L (ref 150–450)
POTASSIUM SERPL-SCNC: 3.6 MMOL/L (ref 3.4–5.3)
RBC # BLD AUTO: 3.74 10E12/L (ref 4.4–5.9)
SODIUM SERPL-SCNC: 141 MMOL/L (ref 133–144)
TROPONIN I SERPL-MCNC: 0.12 UG/L (ref 0–0.04)
TROPONIN I SERPL-MCNC: 0.12 UG/L (ref 0–0.04)
WBC # BLD AUTO: 4.4 10E9/L (ref 4–11)

## 2019-07-15 PROCEDURE — 00000146 ZZHCL STATISTIC GLUCOSE BY METER IP

## 2019-07-15 PROCEDURE — 25000128 H RX IP 250 OP 636

## 2019-07-15 PROCEDURE — 96372 THER/PROPH/DIAG INJ SC/IM: CPT | Mod: 59

## 2019-07-15 PROCEDURE — G0378 HOSPITAL OBSERVATION PER HR: HCPCS

## 2019-07-15 PROCEDURE — 78452 HT MUSCLE IMAGE SPECT MULT: CPT

## 2019-07-15 PROCEDURE — 93018 CV STRESS TEST I&R ONLY: CPT | Performed by: INTERNAL MEDICINE

## 2019-07-15 PROCEDURE — 85027 COMPLETE CBC AUTOMATED: CPT | Performed by: INTERNAL MEDICINE

## 2019-07-15 PROCEDURE — A9502 TC99M TETROFOSMIN: HCPCS | Performed by: INTERNAL MEDICINE

## 2019-07-15 PROCEDURE — 25000132 ZZH RX MED GY IP 250 OP 250 PS 637: Performed by: INTERNAL MEDICINE

## 2019-07-15 PROCEDURE — 78452 HT MUSCLE IMAGE SPECT MULT: CPT | Mod: 26 | Performed by: INTERNAL MEDICINE

## 2019-07-15 PROCEDURE — 93017 CV STRESS TEST TRACING ONLY: CPT

## 2019-07-15 PROCEDURE — 80048 BASIC METABOLIC PNL TOTAL CA: CPT | Performed by: INTERNAL MEDICINE

## 2019-07-15 PROCEDURE — 25000131 ZZH RX MED GY IP 250 OP 636 PS 637: Performed by: INTERNAL MEDICINE

## 2019-07-15 PROCEDURE — 36415 COLL VENOUS BLD VENIPUNCTURE: CPT | Performed by: INTERNAL MEDICINE

## 2019-07-15 PROCEDURE — 99214 OFFICE O/P EST MOD 30 MIN: CPT | Mod: 25 | Performed by: INTERNAL MEDICINE

## 2019-07-15 PROCEDURE — 34300033 ZZH RX 343: Performed by: INTERNAL MEDICINE

## 2019-07-15 PROCEDURE — 93016 CV STRESS TEST SUPVJ ONLY: CPT | Performed by: INTERNAL MEDICINE

## 2019-07-15 PROCEDURE — 84484 ASSAY OF TROPONIN QUANT: CPT | Mod: 91 | Performed by: INTERNAL MEDICINE

## 2019-07-15 PROCEDURE — 99217 ZZC OBSERVATION CARE DISCHARGE: CPT | Performed by: INTERNAL MEDICINE

## 2019-07-15 RX ORDER — LISINOPRIL 40 MG/1
40 TABLET ORAL DAILY
Status: DISCONTINUED | OUTPATIENT
Start: 2019-07-16 | End: 2019-07-15 | Stop reason: HOSPADM

## 2019-07-15 RX ORDER — HYDRALAZINE HYDROCHLORIDE 20 MG/ML
10 INJECTION INTRAMUSCULAR; INTRAVENOUS EVERY 4 HOURS PRN
Status: DISCONTINUED | OUTPATIENT
Start: 2019-07-15 | End: 2019-07-15 | Stop reason: HOSPADM

## 2019-07-15 RX ORDER — REGADENOSON 0.08 MG/ML
INJECTION, SOLUTION INTRAVENOUS
Status: COMPLETED
Start: 2019-07-15 | End: 2019-07-15

## 2019-07-15 RX ORDER — PROCHLORPERAZINE 25 MG
12.5 SUPPOSITORY, RECTAL RECTAL EVERY 12 HOURS PRN
Status: DISCONTINUED | OUTPATIENT
Start: 2019-07-15 | End: 2019-07-15 | Stop reason: HOSPADM

## 2019-07-15 RX ORDER — ISOSORBIDE MONONITRATE 30 MG/1
30 TABLET, EXTENDED RELEASE ORAL DAILY
Status: DISCONTINUED | OUTPATIENT
Start: 2019-07-15 | End: 2019-07-15

## 2019-07-15 RX ORDER — AMINOPHYLLINE 25 MG/ML
50-100 INJECTION, SOLUTION INTRAVENOUS
Status: DISCONTINUED | OUTPATIENT
Start: 2019-07-15 | End: 2019-07-15 | Stop reason: HOSPADM

## 2019-07-15 RX ORDER — PANTOPRAZOLE SODIUM 40 MG/1
40 TABLET, DELAYED RELEASE ORAL
Status: DISCONTINUED | OUTPATIENT
Start: 2019-07-15 | End: 2019-07-15 | Stop reason: HOSPADM

## 2019-07-15 RX ORDER — NALOXONE HYDROCHLORIDE 0.4 MG/ML
.1-.4 INJECTION, SOLUTION INTRAMUSCULAR; INTRAVENOUS; SUBCUTANEOUS
Status: DISCONTINUED | OUTPATIENT
Start: 2019-07-15 | End: 2019-07-15 | Stop reason: HOSPADM

## 2019-07-15 RX ORDER — OXYCODONE HYDROCHLORIDE 5 MG/1
5-10 TABLET ORAL
Status: DISCONTINUED | OUTPATIENT
Start: 2019-07-15 | End: 2019-07-15 | Stop reason: HOSPADM

## 2019-07-15 RX ORDER — POLYETHYLENE GLYCOL 3350 17 G/17G
17 POWDER, FOR SOLUTION ORAL DAILY PRN
Status: DISCONTINUED | OUTPATIENT
Start: 2019-07-15 | End: 2019-07-15 | Stop reason: HOSPADM

## 2019-07-15 RX ORDER — ACETAMINOPHEN 325 MG/1
650 TABLET ORAL EVERY 4 HOURS PRN
Status: DISCONTINUED | OUTPATIENT
Start: 2019-07-15 | End: 2019-07-15 | Stop reason: HOSPADM

## 2019-07-15 RX ORDER — AMOXICILLIN 250 MG
2 CAPSULE ORAL 2 TIMES DAILY PRN
Status: DISCONTINUED | OUTPATIENT
Start: 2019-07-15 | End: 2019-07-15 | Stop reason: HOSPADM

## 2019-07-15 RX ORDER — DEXTROSE MONOHYDRATE 25 G/50ML
25-50 INJECTION, SOLUTION INTRAVENOUS
Status: DISCONTINUED | OUTPATIENT
Start: 2019-07-15 | End: 2019-07-15 | Stop reason: HOSPADM

## 2019-07-15 RX ORDER — LISINOPRIL 20 MG/1
20 TABLET ORAL DAILY
Status: DISCONTINUED | OUTPATIENT
Start: 2019-07-15 | End: 2019-07-15

## 2019-07-15 RX ORDER — CARVEDILOL 25 MG/1
25 TABLET ORAL 2 TIMES DAILY WITH MEALS
Status: DISCONTINUED | OUTPATIENT
Start: 2019-07-15 | End: 2019-07-15 | Stop reason: HOSPADM

## 2019-07-15 RX ORDER — ISOSORBIDE MONONITRATE 60 MG/1
60 TABLET, EXTENDED RELEASE ORAL DAILY
Status: DISCONTINUED | OUTPATIENT
Start: 2019-07-15 | End: 2019-07-15 | Stop reason: HOSPADM

## 2019-07-15 RX ORDER — FUROSEMIDE 20 MG/1
10 TABLET ORAL 2 TIMES DAILY
Status: DISCONTINUED | OUTPATIENT
Start: 2019-07-15 | End: 2019-07-15 | Stop reason: HOSPADM

## 2019-07-15 RX ORDER — ACYCLOVIR 200 MG/1
0-1 CAPSULE ORAL
Status: DISCONTINUED | OUTPATIENT
Start: 2019-07-15 | End: 2019-07-15 | Stop reason: HOSPADM

## 2019-07-15 RX ORDER — PROCHLORPERAZINE MALEATE 5 MG
5 TABLET ORAL EVERY 6 HOURS PRN
Status: DISCONTINUED | OUTPATIENT
Start: 2019-07-15 | End: 2019-07-15 | Stop reason: HOSPADM

## 2019-07-15 RX ORDER — CLOPIDOGREL BISULFATE 75 MG/1
75 TABLET ORAL DAILY
Status: DISCONTINUED | OUTPATIENT
Start: 2019-07-15 | End: 2019-07-15 | Stop reason: HOSPADM

## 2019-07-15 RX ORDER — ALBUTEROL SULFATE 90 UG/1
2 AEROSOL, METERED RESPIRATORY (INHALATION) EVERY 5 MIN PRN
Status: DISCONTINUED | OUTPATIENT
Start: 2019-07-15 | End: 2019-07-15 | Stop reason: HOSPADM

## 2019-07-15 RX ORDER — POTASSIUM CHLORIDE 750 MG/1
10 TABLET, EXTENDED RELEASE ORAL 2 TIMES DAILY
Status: DISCONTINUED | OUTPATIENT
Start: 2019-07-15 | End: 2019-07-15 | Stop reason: CLARIF

## 2019-07-15 RX ORDER — ATORVASTATIN CALCIUM 40 MG/1
40 TABLET, FILM COATED ORAL EVERY EVENING
Status: DISCONTINUED | OUTPATIENT
Start: 2019-07-15 | End: 2019-07-15 | Stop reason: HOSPADM

## 2019-07-15 RX ORDER — BISACODYL 10 MG
10 SUPPOSITORY, RECTAL RECTAL DAILY PRN
Status: DISCONTINUED | OUTPATIENT
Start: 2019-07-15 | End: 2019-07-15 | Stop reason: HOSPADM

## 2019-07-15 RX ORDER — ISOSORBIDE MONONITRATE 60 MG/1
60 TABLET, EXTENDED RELEASE ORAL DAILY
Qty: 30 TABLET | Refills: 0 | Status: SHIPPED | OUTPATIENT
Start: 2019-07-16 | End: 2019-08-05

## 2019-07-15 RX ORDER — SPIRONOLACTONE 25 MG/1
25 TABLET ORAL DAILY
Status: DISCONTINUED | OUTPATIENT
Start: 2019-07-15 | End: 2019-07-15 | Stop reason: HOSPADM

## 2019-07-15 RX ORDER — HYDROMORPHONE HYDROCHLORIDE 1 MG/ML
0.2 INJECTION, SOLUTION INTRAMUSCULAR; INTRAVENOUS; SUBCUTANEOUS
Status: DISCONTINUED | OUTPATIENT
Start: 2019-07-15 | End: 2019-07-15 | Stop reason: HOSPADM

## 2019-07-15 RX ORDER — LISINOPRIL 40 MG/1
40 TABLET ORAL DAILY
Qty: 30 TABLET | Refills: 0 | Status: SHIPPED | OUTPATIENT
Start: 2019-07-15 | End: 2019-08-08

## 2019-07-15 RX ORDER — NICOTINE POLACRILEX 4 MG
15-30 LOZENGE BUCCAL
Status: DISCONTINUED | OUTPATIENT
Start: 2019-07-15 | End: 2019-07-15 | Stop reason: HOSPADM

## 2019-07-15 RX ORDER — SPIRONOLACTONE 25 MG/1
25 TABLET ORAL DAILY
Qty: 30 TABLET | Refills: 0 | Status: SHIPPED | OUTPATIENT
Start: 2019-07-16 | End: 2019-08-05

## 2019-07-15 RX ORDER — REGADENOSON 0.08 MG/ML
0.4 INJECTION, SOLUTION INTRAVENOUS ONCE
Status: COMPLETED | OUTPATIENT
Start: 2019-07-15 | End: 2019-07-15

## 2019-07-15 RX ORDER — LISINOPRIL 10 MG/1
10 TABLET ORAL DAILY
Status: DISCONTINUED | OUTPATIENT
Start: 2019-07-15 | End: 2019-07-15

## 2019-07-15 RX ORDER — LIDOCAINE 40 MG/G
CREAM TOPICAL
Status: DISCONTINUED | OUTPATIENT
Start: 2019-07-15 | End: 2019-07-15 | Stop reason: HOSPADM

## 2019-07-15 RX ORDER — AMOXICILLIN 250 MG
1 CAPSULE ORAL 2 TIMES DAILY PRN
Status: DISCONTINUED | OUTPATIENT
Start: 2019-07-15 | End: 2019-07-15 | Stop reason: HOSPADM

## 2019-07-15 RX ORDER — NITROGLYCERIN 0.4 MG/1
0.4 TABLET SUBLINGUAL EVERY 5 MIN PRN
Status: DISCONTINUED | OUTPATIENT
Start: 2019-07-15 | End: 2019-07-15 | Stop reason: HOSPADM

## 2019-07-15 RX ORDER — ACETAMINOPHEN 650 MG/1
650 SUPPOSITORY RECTAL EVERY 4 HOURS PRN
Status: DISCONTINUED | OUTPATIENT
Start: 2019-07-15 | End: 2019-07-15 | Stop reason: HOSPADM

## 2019-07-15 RX ADMIN — TETROFOSMIN 32.8 MCI.: 1.38 INJECTION, POWDER, LYOPHILIZED, FOR SOLUTION INTRAVENOUS at 11:20

## 2019-07-15 RX ADMIN — CLOPIDOGREL BISULFATE 75 MG: 75 TABLET ORAL at 09:16

## 2019-07-15 RX ADMIN — APIXABAN 5 MG: 5 TABLET, FILM COATED ORAL at 09:14

## 2019-07-15 RX ADMIN — REGADENOSON 0.4 MG: 0.08 INJECTION, SOLUTION INTRAVENOUS at 11:17

## 2019-07-15 RX ADMIN — LEVOTHYROXINE SODIUM 137 MCG: 25 TABLET ORAL at 09:14

## 2019-07-15 RX ADMIN — CARVEDILOL 25 MG: 25 TABLET, FILM COATED ORAL at 09:14

## 2019-07-15 RX ADMIN — ISOSORBIDE MONONITRATE 60 MG: 60 TABLET, EXTENDED RELEASE ORAL at 09:15

## 2019-07-15 RX ADMIN — SPIRONOLACTONE 25 MG: 25 TABLET, FILM COATED ORAL at 09:15

## 2019-07-15 RX ADMIN — TETROFOSMIN 10.5 MCI.: 1.38 INJECTION, POWDER, LYOPHILIZED, FOR SOLUTION INTRAVENOUS at 09:36

## 2019-07-15 RX ADMIN — NITROGLYCERIN 0.4 MG: 0.4 TABLET SUBLINGUAL at 04:32

## 2019-07-15 RX ADMIN — FUROSEMIDE 10 MG: 20 TABLET ORAL at 09:16

## 2019-07-15 RX ADMIN — INSULIN GLARGINE 36 UNITS: 100 INJECTION, SOLUTION SUBCUTANEOUS at 09:14

## 2019-07-15 RX ADMIN — LISINOPRIL 30 MG: 20 TABLET ORAL at 09:14

## 2019-07-15 RX ADMIN — NITROGLYCERIN 0.4 MG: 0.4 TABLET SUBLINGUAL at 04:43

## 2019-07-15 RX ADMIN — PANTOPRAZOLE SODIUM 40 MG: 40 TABLET, DELAYED RELEASE ORAL at 06:38

## 2019-07-15 NOTE — PROGRESS NOTES
Pre-procedure:  Are you having any pain or shortness of breath (prior to starting)? y  Initial vital signs: /87, HR 71, RR 20  Allergies reviewed: y   Rhythm: Sinus  Medications taken within 48 hours of procedure:    Any nitrates within the last 48 hours:  Last Caffeine:   Lung sounds: CTA, no wheezing, crackles or rtx  Health History (COPD, Asthma, etc):            Procedure: Lexiscan  Reaction/symptoms after receiving Xochitl injection: Shortness of breath  Intensity of Pain: 0  Rhythm: sinus  1. Vital Signs:/71, HR 80, RR 20  2. Vital Signs:/74, HR 74, RR 20     Reversal agent: N/A    Post:   Resolution of symptoms?: YES  Vital signs: /73, HR 77, RR 20  Rhythm: sinus  Walk: NO  Comment:   Return to Radiology

## 2019-07-15 NOTE — PROVIDER NOTIFICATION
Pt complaining of intermittent chest pain rated 3-4/10. Described as sharp and occasionally pressure. SL Nitro given x2 with some relief, now rated 1/10. VSS. MD notified.  Anju Molina RN on 7/15/2019 at 4:54 AM

## 2019-07-15 NOTE — PROGRESS NOTES
Care Coordination:  CTS identifies pt as high risk due to High ANDRIA score and readmission status. This is pt's 5th admit for varying reasons in the last 6 months. Pt was most recently admitted from 7/5-7/9 for NSTEMI and had an angio with stents. Prior to that he had had a couple of admission for LE cellulitis. He has a history of diabetes, HTN, hyperlipidemia, afib and CM with EF 35-40%. He lives at home with his wife. He was recently discharged home with CR and MTM referral. Cardiology clinic care coordination and [facility] clinic RN had followed up with him and he was readmitted before MD follow up. He is now admitted with CP/HTN. He is admitted as observation and having a lexiscan stress test. Anticipate he will discharge back home with his wife. No gaps in care identified. He has Cardiac Rehab on 7/16 and Podiatry appt next week. Will cont to follow for discharge needs.    Chloé Smiley RN CTS

## 2019-07-15 NOTE — PROGRESS NOTES
Cardiology consult dictated.  Patient with recent stenting of the right coronary artery a week ago and prior anterior myocardial infarct and prior stenting of the left anterior descending artery and circumflex arteries who presents with atypical chest discomfort.  Also presents with blood pressure being consistently in the 150s.  Troponin is raised but flat.  EKG shows no ischemic changes.  At the end of the coronary intervention a week ago he was completely revascularized with no flow-limiting lesions.  States that he has had discomfort since the intervention.  Will increase the dose of lisinopril to 30 mg/day.  Increase isosorbide mononitrate to 60 mg/day.  We will add in spironolactone 25 mg/day which would be a useful medication for both blood pressure control and also as the patient has moderately reduced left ventricular systolic function post anterior myocardial infarct and that is a class I indication for the introduction of spironolactone.  We will follow his basic metabolic profile closely.  We will stop the potassium replacement in the presence of spironolactone.  Will order Lexiscan study today to see if there is any evidence of ischemia.  We will continue the Plavix and spironolactone.  Patient has some dental work to do but this will have to be postponed for approximately 6 months unless pain becomes intolerable.

## 2019-07-15 NOTE — ED NOTES
[facility]  ED Nurse Handoff Report    Jayro Elder is a 69 year old male   ED Chief complaint: Hypertension  . ED Diagnosis:   Final diagnoses:   Hypertension, unspecified type   Acute chest pain   Elevated troponin     Allergies:   Allergies   Allergen Reactions     No Known Allergies        Code Status: Full Code  Activity level - Baseline/Home:  Independent. Activity Level - Current:   Stand by Assist. Lift room needed: No. Bariatric: No   Needed: No   Isolation: No. Infection: Not Applicable.     Vital Signs:   Vitals:    07/14/19 2200 07/14/19 2215 07/14/19 2230 07/14/19 2245   BP: (!) 154/101 (!) 152/98 (!) 152/93 (!) 159/94   Pulse: 71 75 74 76   Resp: 13 16 17 17   Temp:       TempSrc:       SpO2: 98% 99% 98% 99%       Cardiac Rhythm:  ,      Pain level:    Patient confused: No. Patient Falls Risk: Yes.   Elimination Status: Has voided   Patient Report - Initial Complaint: Had 3 heart stents placed on Monday.  Increasing blood pressure for last two days.  Also reports chest, neck and arm pain since procedure.. Focused Assessment: Cardiac - Cardiac WDL: -WDL except; heart sounds; chest pain   Review of Systems (Cardiac) - Cardiovascular Symptoms/Conditions: cardiomyopathy; chest pain; coronary artery disease; high blood cholesterol; hypertension; myocardial infarction   Chest Pain Assessment - Rating (0-10): 3  Chest Pain Location: midsternal  Chest Pain Radiation: arm; back  Precipitating Factors: nothing  Chest Pain Reproducible?: No   Cardiac Monitoring - EKG Monitoring: Yes   Chest Pain Assessment - Character: sharp; squeezing    Tests Performed: Lab, CXR. Abnormal Results:   Labs Ordered and Resulted from Time of ED Arrival Up to the Time of Departure from the ED   BASIC METABOLIC PANEL - Abnormal; Notable for the following components:       Result Value    Glucose 225 (*)     All other components within normal limits   CBC WITH PLATELETS DIFFERENTIAL - Abnormal; Notable for  the following components:    RBC Count 3.95 (*)     Hemoglobin 11.3 (*)     Hematocrit 34.8 (*)     All other components within normal limits   TROPONIN I - Abnormal; Notable for the following components:    Troponin I ES 0.125 (*)     All other components within normal limits   TROPONIN I - Abnormal; Notable for the following components:    Troponin I ES 0.125 (*)     All other components within normal limits     XR Chest 2 Views   Preliminary Result   IMPRESSION: No acute disease.      .   Treatments provided: See MAR  Family Comments:   Wife present  OBS brochure/video discussed/provided to patient:  N/A  ED Medications:   Medications   nitroGLYcerin (NITROSTAT) sublingual tablet 0.4 mg (0.4 mg Sublingual Given 7/14/19 2050)   carvedilol (COREG) tablet 25 mg (25 mg Oral Given 7/14/19 2154)   apixaban ANTICOAGULANT (ELIQUIS) tablet 5 mg (5 mg Oral Given 7/14/19 2154)     Drips infusing:  No  For the majority of the shift, the patient's behavior Green. Interventions performed were none.     Severe Sepsis OR Septic Shock Diagnosis Present: No      ED Nurse Name/Phone Number: Olu Ballesteros,   11:13 PM  RECEIVING UNIT ED HANDOFF REVIEW    Above ED Nurse Handoff Report was reviewed: Yes  Reviewed by: Anju Molina on July 14, 2019 at 11:31 PM

## 2019-07-15 NOTE — H&P
[facility]    History and Physical - Hospitalist Service       Date of Admission:  7/14/2019    Assessment & Plan   Jayro Elder is a 69 year old male admitted on 7/14/2019. He has a past medical history significant for hypertension, hyperlipidemia, diabetes mellitus, hypothyroidism, atrial fibrillation, sleep apnea, peripheral neuropathy, osteoarthritis, previous stroke, and coronary artery disease.  He was recently hospitalized for subacute non-ST elevation myocardial infarction.  He did have stents placed in his right coronary artery at that time.  He returns to the emergency room today because his blood pressures have been running higher than usual for the past 2 days.  He has had chest pain since his coronary angiogram.  Chest pain is unchanged.  He has been having occasional episodes of epistaxis.    1.  Hypertension.  Restart carvedilol, lisinopril, isosorbide mononitrate, and furosemide.  Have IV hydralazine if needed.  Likely needs adjustments of his medications over the next day.    2.  Coronary artery disease.  Minimal troponin elevation is likely due to recent coronary angiogram and stent placement.  Monitor on telemetry.  Check serial troponins.  Continue clopidogrel, atorvastatin, carvedilol, isosorbide mononitrate, and lisinopril. His aspirin was recently stopped as he is also on apixaban for atrial fibrillation.  Cardiology consult.    3.  Hyperlipidemia.  Restart atorvastatin.    4.  Hypothyroidism.  Restart levothyroxine.    5.  Epistaxis.  Episodes have been very short-lived.  Would continue his apixaban and clopidogrel for now.  Continue to monitor.  Start saline nose spray.    6.  Diabetes mellitus.  Restart Lantus.  Start NovoLog sliding scale.    7.  Atrial fibrillation.  Continue apixaban and carvedilol.    8.  GERD.  Restart pantoprazole.     Diet: Diabetic diet.  DVT Prophylaxis: Apixaban.  Taylor Catheter: not present  Code Status: Full code      Xiang Baumann  DO  [facility]    ______________________________________________________________________    Chief Complaint   High blood pressure.    History is obtained from the patient    History of Present Illness   Jayro Elder is a 69 year old male who has a past medical history significant for hypertension, hyperlipidemia, diabetes mellitus, hypothyroidism, atrial fibrillation, sleep apnea, peripheral neuropathy, osteoarthritis, previous stroke, and coronary artery disease.  He was recently hospitalized for subacute non-ST elevation myocardial infarction.  He did have stents placed in his right coronary artery at that time.  He returns to the emergency room today because his blood pressures have been running higher than usual for the past 2 days.  Most of the readings for the past 2 days have been in the 150s over 100s.  He has had fairly constant chest pain since his coronary angiogram.  Chest pain is unchanged since time of angiogram.  Pain does radiate up into his jaw.  He has been having occasional episodes of epistaxis.  Estimates that he has 3 episodes a day for the past 3 days.  Episodes will last for approximately 5 minutes.  They stop quickly when he puts some Kleenex in his nose.  He does not feel short of breath.  No cough.  No fevers or chills.  No other complaints.    Review of Systems    The 10 point Review of Systems is negative other than noted in the HPI     Past Medical History    I have reviewed this patient's medical history and updated it with pertinent information if needed.   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     Cardiomyopathy (H)      Cellulitis of left lower extremity 3/13/2018     Cerebral infarction (H)     eye sight decreased in peripheral of right side and blurry     Diabetic ulcer of left midfoot associated with type 2 diabetes  mellitus, with fat layer exposed (H) 3/13/2018     Essential hypertension, benign 11/13/2002     Generalized osteoarthrosis, unspecified site 11/13/2002     Microalbuminuria 3/13/2018    X1     Myocardial infarction (H)      Neuropathy      Sepsis (H)      Sleep apnea     doesn't use it     Substance abuse (H)      Syncope, unspecified syncope type 3/13/2018     Thyroid disease     takes medicine     Tobacco use disorder 11/13/2002     Type 2 diabetes mellitus with diabetic peripheral angiopathy without gangrene, unspecified long term insulin use status 2005       Past Surgical History   I have reviewed this patient's surgical history and updated it with pertinent information if needed.  Past Surgical History:   Procedure Laterality Date     AMPUTATE TOE(S) Right 1/6/2019    Procedure: Right open second toe partial amputation;  Surgeon: Sabino Garcia DPM;  Location: [facility] OR     AMPUTATE TOE(S) Right 1/8/2019    Procedure: 1.  Revision right second toe amputation with resection of distal half of proximal phalanx with full closure.    2.  Debridement of callus/preulcerative lesion, distal left second toe and plantar left first metatarsophalangeal joint.;  Surgeon: Ryan Bhagat DPM;  Location: [facility] OR     AMPUTATE TOE(S) Right 3/12/2019    Procedure: Partial right fourth toe amputation.;  Surgeon: Ryan Bhagat DPM;  Location: [facility] OR     AMPUTATE TOE(S) Right 5/6/2019    Procedure: Right partial third toe amputation;  Surgeon: Татьяна Mckeon DPM, Podiatry/Foot and Ankle Surgery;  Location: [facility] OR     ANGIOGRAM  6/29/05    subtotal occ.mid LAD,SUSAN mid LAD     ANGIOGRAM  7/05    mild CAD,patent stent,no flow-limiting lesions,sev.LV dysf.LAD enlarged     ANGIOGRAM  2/09    Sev.single vessel disease,Mod LV dysf.distal LAD 90%,70-75% mid lad just before prev stent,SUSAN to prox.mid LAD, endeavor to distal LAD     ANGIOGRAM  11/13/13    restenosis, stent LAD     BACK SURGERY      back surgery x3     C  NONSPECIFIC PROCEDURE      Laminectomy x 3 - (1983 x 2 & )     C NONSPECIFIC PROCEDURE Bilateral 1998    Bunionectomy     C NONSPECIFIC PROCEDURE      Gingival surgery at age 9     CV CORONARY ANGIOGRAM N/A 2019    Procedure: Coronary Angiogram;  Surgeon: Jose Alfredo Crockett MD;  Location:  HEART CARDIAC CATH LAB     CV LEFT HEART CATH N/A 2019    Procedure: Left Heart Cath;  Surgeon: Jose Alfredo Crockett MD;  Location:  HEART CARDIAC CATH LAB     CV PCI ASPIRATION THROMECTOMY N/A 2019    Procedure: PCI Aspiration Thrombectomy;  Surgeon: Jose Alfredo Crockett MD;  Location:  HEART CARDIAC CATH LAB     CV PCI STENT DRUG ELUTING N/A 2019    Procedure: PCI Stent Drug Eluting;  Surgeon: Jose Alfredo Crockett MD;  Location:  HEART CARDIAC CATH LAB     HEART CATH LEFT HEART CATH  2017    SUSAN to OM3     INCISION AND DRAINAGE TOE, COMBINED Bilateral 2018    Procedure: COMBINED INCISION AND DRAINAGE TOE;  1) Irrigation and debridement ulcer left great toe  2) Amputation 3rd toe right foot at distal interphalangeal joint level;  Surgeon: Miki Harp MD;  Location:  OR     IRRIGATION AND DEBRIDEMENT FOOT, COMBINED Left 3/12/2019    Procedure: Debridement of left foot ulcer sub first MPJ 2 x 2.5 cm down to and including subcutaneous tissue, callus debridement for preulcerative lesion, left second toe.;  Surgeon: Ryan Bhagat DPM;  Location:  OR     ORTHOPEDIC SURGERY      bunion surgery both feet     ORTHOPEDIC SURGERY      left shoulder     SOFT TISSUE SURGERY      debridement of toe numerous time     VASCULAR SURGERY      7 stents in heart       Social History   I have reviewed this patient's social history and updated it with pertinent information if needed.  Social History     Tobacco Use     Smoking status: Former Smoker     Packs/day: 1.00     Years: 25.00     Pack years: 25.00     Types: Cigarettes     Last attempt to quit: 2005     Years since quittin.9      Smokeless tobacco: Never Used   Substance Use Topics     Alcohol use: No     Alcohol/week: 2.4 oz     Types: 4 Shots of liquor per week     Frequency: Never     Comment: almost every day     Drug use: No       Family History   I have reviewed this patient's family history and updated it with pertinent information if needed.   Family History   Problem Relation Age of Onset     Cancer - colorectal Sister      Thyroid Disease Brother      Cardiovascular Brother      Diabetes Brother      Cancer Father         tumor in chest and throat     Arthritis Mother      Thyroid Disease Mother      Diabetes Mother      Glaucoma No family hx of      Macular Degeneration No family hx of        Prior to Admission Medications   Prior to Admission Medications   Prescriptions Last Dose Informant Patient Reported? Taking?   Cholecalciferol (VITAMIN D3) 2000 units TABS 7/14/2019 at Unknown time  Yes Yes   Sig: Take 1 tablet by mouth daily   NONFORMULARY Unknown at Unknown time  Yes No   Sig: Topical patch for diabetes (blood glucose control) - changes every 4 days or so   apixaban ANTICOAGULANT (ELIQUIS) 5 MG tablet 7/14/2019 at Unknown time  No Yes   Sig: Take 1 tablet (5 mg) by mouth 2 times daily   aspirin 81 MG EC tablet Past Week at Unknown time  No Yes   Sig: Take 1 tablet (81 mg) by mouth daily for 5 days   atorvastatin (LIPITOR) 40 MG tablet 7/13/2019 at Unknown time  No Yes   Sig: Take 1 tablet (40 mg) by mouth every evening   carvedilol (COREG) 25 MG tablet 7/14/2019 at Unknown time  No Yes   Sig: Take 1 tablet (25 mg) by mouth 2 times daily (with meals)   clopidogrel (PLAVIX) 75 MG tablet 7/13/2019 at Unknown time  No Yes   Sig: Take 1 tablet (75 mg) by mouth daily   furosemide (LASIX) 20 MG tablet 7/14/2019 at Unknown time  No Yes   Sig: Take 0.5 tablets (10 mg) by mouth 2 times daily   insulin aspart (NOVOLOG FLEXPEN) 100 UNIT/ML pen 7/14/2019 at Unknown time  Yes Yes   Sig: Inject Subcutaneous 3 times daily (with meals)  Sliding Scale  Do not give sliding scale insulin if Pre-Meal BG less than 140.   For Pre-Meal:   - 200 give 2 units.    - 250 give 4 units.    - 300 give 6 units.    - 350 give 8 units.    - 400 give 10 units.   insulin aspart (NOVOLOG FLEXPEN) 100 UNIT/ML pen 7/14/2019 at Unknown time  Yes Yes   Sig: Inject 8 Units Subcutaneous daily (with lunch)   insulin aspart (NOVOLOG FLEXPEN) 100 UNIT/ML pen 7/14/2019 at Unknown time  Yes Yes   Sig: Inject 10 Units Subcutaneous daily (with dinner)   insulin aspart (NOVOLOG PEN) 100 UNIT/ML pen 7/14/2019 at Unknown time  Yes Yes   Sig: Inject 8 Units Subcutaneous daily (with breakfast)    insulin glargine (LANTUS SOLOSTAR PEN) 100 UNIT/ML pen 7/14/2019 at Unknown time  No Yes   Sig: Inject 36 Units Subcutaneous every morning   insulin pen needle 31G X 6 MM 7/14/2019 at Unknown time Self No Yes   Sig: Use  as directed.   isosorbide mononitrate (IMDUR) 30 MG 24 hr tablet 7/14/2019 at Unknown time  No Yes   Sig: Take 1 tablet (30 mg) by mouth daily   levothyroxine (SYNTHROID, LEVOTHROID) 137 MCG tablet 7/14/2019 at Unknown time Self Yes Yes   Sig: Take 137 mcg by mouth daily    lisinopril (PRINIVIL/ZESTRIL) 10 MG tablet 7/14/2019 at Unknown time  No Yes   Sig: Take 1 tablet (10 mg) by mouth daily   nitroglycerin (NITROSTAT) 0.4 MG sublingual tablet Past Week at Unknown time Self No Yes   Sig: Place 1 tablet (0.4 mg) under the tongue every 5 minutes as needed for chest pain   order for DME Unknown at Unknown time Self No No   Sig: Equipment being ordered: Walker Wheels () and Walker ()  Treatment Diagnosis: Impaired gait, heel WB bilaterally.   order for DME Unknown at Unknown time  No No   Sig: Equipment being ordered: Other: surgical shoe male XL  Treatment Diagnosis: sp right 2nd toe amputaion   pantoprazole (PROTONIX) 40 MG enteric coated tablet 7/14/2019 at Unknown time Self No Yes   Sig: Take 1 tablet (40 mg) by mouth every morning  (before breakfast)   potassium chloride ER (K-DUR/KLOR-CON M) 10 MEQ CR tablet 7/14/2019 at Unknown time  No Yes   Sig: Take 1 tablet (10 mEq) by mouth 2 times daily      Facility-Administered Medications: None     Allergies   Allergies   Allergen Reactions     No Known Allergies        Physical Exam   Vital Signs: Temp: 98.2  F (36.8  C) Temp src: Oral BP: (!) 154/101 Pulse: 71 Heart Rate: 72 Resp: 13 SpO2: 98 % O2 Device: None (Room air)    Weight: 0 lbs 0 oz    Gen:  NAD, A&Ox3.  Eyes:  PERRL, sclera anicteric.  OP:  MMM, no lesions.  Neck:  Supple.  CV:  Mildly irregular, no loud murmurs.  Lung:  CTA b/l, normal effort.  Ab:  +BS, soft.  Skin:  Warm, dry to touch.  No rash.  Ext:  Mild non pitting edema LE b/l.      Data   Data reviewed today: I reviewed all medications, new labs and imaging results over the last 24 hours.  EKG shows atrial fibrillation.  No obvious acute ischemia.    Recent Labs   Lab 07/14/19 2024 07/09/19  0514 07/08/19  0643   WBC 6.0 5.6  --    HGB 11.3* 12.6*  --    MCV 88 88  --     218  --     140 138   POTASSIUM 3.8 3.9 3.8   CHLORIDE 105 107 102   CO2 26 29 29   BUN 21 16 20   CR 1.09 1.05 1.14   ANIONGAP 7 4 7   BETTIE 8.5 8.9 9.2   * 82 137*   TROPI 0.125*  --   --

## 2019-07-15 NOTE — PLAN OF CARE
VSS on RA other than elevated Bp, scheduled medications adjusted by MD see MAR. Kiara cp and SOB. Tele afib CVR BBB. NM lexiscan today, showed no change since one in 2018. Pt has good appetite. , lunch  post meal, did not give coverage because of this. SBA. Discharge today.

## 2019-07-15 NOTE — CONSULTS
Consult Date:  07/15/2019      CARDIOLOGY CONSULTATION      REQUESTING PHYSICIAN:  Hospitalist Service      INDICATION FOR CARDIAC CONSULTATION:  Chest discomfort and hypertension.      Dear Doctors:        It is my pleasure to see your patient, Jayro Elder.  This is a 69-year-old patient with a known history of coronary artery disease.  This patient suffered an anterior myocardial infarct in 2005 with stenting of the left anterior descending artery.  This patient also had stenting of the proximal circumflex.  Most recently, on 07/08/2019, the patient had stenting of a totally occluded, but collateralized right coronary artery.  He had a non-Q-wave myocardial infarct in this situation.  The patient had stenting from just before the bifurcation up to and including the mid right coronary artery with 2 long stents placed.  There was no evidence of any flow-limiting disease in the left coronary artery territory with both of these stents being widely patent.  Echocardiography performed at the time of admission showed an ejection fraction of 35% to 40% range with akinesis of the mid to apical anterior wall, the mid anteroseptal wall and apical septal walls, as well as the true apex.  Mildly decreased right ventricular systolic function was also noted.  This patient has chronic atrial fibrillation and is anticoagulated with apixaban.  The patient now presents with blood pressure, which is higher than normal, being in the 150s.  He has also noticed chest discomfort.  He is somewhat of a nonspecific historian, but he gives a history of having chest discomfort, which is there continuously since the right coronary artery was stented.  The discomfort is sometimes sharp.  Sometimes it is aggravated by respiration.  He does not think it is aggravated by exertion, but he says he does not walk much or do much, but on pressing him, he does not get aggravation of the chest discomfort with his activities of daily living.  His  troponin has an unusual pattern that is quite flat.  The first troponin is 0.125, second troponin is 0.125, third troponin 0.121, fourth troponin 0.121.  The 12-lead electrocardiogram at the time of the chest discomfort does not show any acute ischemic changes.  There is an RSR prime pattern in the inferior leads.  He is in a right bundle branch block pattern and a left axis deviation consistent with bifascicular block, but no ischemic ST or T-wave changes noted.  On review of his blood pressure, his blood pressure is consistently in the 150s.  At the time of admission, the chest x-ray showed no acute changes and the pulmonary vasculature is unremarkable.  He does not give symptoms suggestive of congestive heart failure.      PAST MEDICAL HISTORY:   1.  Coronary artery disease with prior stenting of the left anterior descending artery, circumflex, and most recently the right coronary artery.   2.  Prior anterior myocardial infarct in 2005.   3.  Chronic atrial fibrillation.   4.  Ischemic cardiomyopathy with an EF of 35% to 40%.   5.  Cerebral infarction.   6.  Diabetes mellitus.   7.  Diabetic ulcer in the left mid foot in 2018.   8.  Essential hypertension.   9.  Osteoarthrosis.   10.  Peripheral neuropathy.   11.  Sleep apnea, does not use CPAP.   12.  Substance abuse.   13.  Syncope.   14.  Hypothyroidism.   15.  Tobacco abuse.      PAST SURGICAL HISTORY:   1.  Amputation of the second toe.   2.  Right fourth toe amputation.   3.  Right third toe amputation.   4.  Back surgery in 1983 and 1990.   5.  Bunionectomy.   6.  Incision and drainage of toe in 2018.   7.  Irrigation and debridement of the left foot in 03/2019.      FAMILY HISTORY:  Sister with colorectal surgery.  Brother with thyroid disease.  Brother with cardiovascular disease.  Brother with diabetes.  Father with cancer involving the chest and throat.  Mother had arthritis, thyroid disease and diabetes.      SOCIAL HISTORY:  Stopped smoking nearly 14  years ago, 25 pack-year history.  Drinks 4 shots of liquor per week.      MEDICATIONS:   1.  Apixaban 5 mg twice a day.   2.  Atorvastatin 40 mg every evening.   3.  Carvedilol 25 mg 2 times a day.   4.  Clopidogrel 75 mg per day.   5.  Furosemide 10 mg 2 times a day.   6.  Insulin sliding scale.   7.  Insulin glargine 36 units subcutaneously every morning.   8.  Isosorbide mononitrate 30 mg per day.   9.  Levothyroxine 137 mcg per day.   10.  Lisinopril 10 mg per day.   11.  Pantoprazole 40 mg every morning.   12.  Potassium chloride 10 mEq 2 times a day.      ALLERGIES:  HE HAS NO KNOWN DRUG ALLERGIES.      REVIEW OF SYSTEMS:   CONSTITUTIONAL:  Tiredness.   EYES:  Wears spectacles.   ENT:   Has sore throat when swallowing after his coronary intervention a week ago.   CARDIOVASCULAR:  As above.   RESPIRATORY:  Nil.   GASTROINTESTINAL:  Nil.   GENITOURINARY:  Nil.   MUSCULOSKELETAL:  Nil.   NEUROLOGICAL:  Peripheral neuropathy due to diabetes mellitus.   PSYCHIATRIC:  Nil.   ENDOCRINE:  Diabetes mellitus and hypothyroidism.   HEMATOLYMPHATIC:  Nil.   ALLERGY/IMMUNOLOGY:  Nil.      PHYSICAL EXAMINATION:   GENERAL:  He is a 69-year-old male in no apparent distress.   VITAL SIGNS:  His blood pressure is 158/96.  His pulse is 77 beats per minute.  His temperature is 97.3, respirations are 16, O2 sats are 97%.   HEENT:  Unremarkable.   NECK:  Jugular venous pulse is not raised.  Carotids are normal with no bruits.   HEART:  Alamo beat is impalpable.  Heart sounds 1 and 2 are normal with no murmurs.   CHEST:  Clear to percussion and auscultation with no added sounds.  Some diffuse tenderness on the chest when palpating the chest.   ABDOMEN:  Mild truncal obesity, but otherwise normal.  No organomegaly.   EXTREMITIES:  His pedal pulses are trace bilaterally.   SKIN:  No obvious skin lesions noted.   NEUROLOGIC:  He moves all 4 limbs appropriately with no obvious sensory or motor loss/  He is fully oriented in time, place  and person with a flat affect.      LABORATORY INVESTIGATIONS:  Sodium 141, potassium 3.6, BUN is 19, creatinine is 0.99, GFR of 78.  Troponins as described above.  White cell count 4.4, hemoglobin 10.7, platelet count is 199,000.      IMPRESSION:   1.  Atypical chest discomfort.  The patient has had chest discomfort since the intervention  despite the fact that the vessel is now wide open and there are no flow-limiting lesions on the left coronary side.  Troponin is flat and not typical of a myocardial infarct.  His EKG does not show evidence of ischemic changes.  Some of the characteristics of the pain sounds more musculoskeletal in that it seems to be aggravated by taking a deep inspiration.  It is not aggravated by exertion.   2.  Diabetes mellitus with vascular complications and amputation of toes and foot ulcers in the past.   3.  Essential hypertension.  His blood pressure is not well controlled.   4.  Ischemic cardiomyopathy.  Ejection fraction 35% to 40% and unchanged from previously.   5.  Status post anterior myocardial infarct approximately 14 years ago.   6.  Atrial fibrillation with controlled ventricular response.  Anticoagulated with apixaban.      PLAN:  Firstly, we will try to control his blood pressure by increasing his lisinopril to 30 mg per day.  I will also add spironolactone 25 mg per day.  This is a useful antihypertensive agent but also useful in this patient who has got a mildly to moderately reduced left ventricular systolic function due to an anterior MI.  That is a class 1 indication for spironolactone.  We will also increase his isosorbide mononitrate to 60 mg per day.  I will obtain a Lexiscan study to determine if there is any evidence of ischemia, though I certainly would doubt this given the coronary anatomy from a week ago.  We will continue to follow the patient closely.  We will check daily basic metabolic profiles as he is on spironolactone.      Many thanks for allowing me to  be involved in the care of this patient.         SAPNA GRAYSON MD, [facility]             D: 07/15/2019   T: 07/15/2019   MT: DIO      Name:     PORTER YANCEY   MRN:      [id number]        Account:       [id number]   :      1950           Consult Date:  07/15/2019      Document: [id number]

## 2019-07-15 NOTE — PLAN OF CARE
PRIMARY DIAGNOSIS: CHEST PAIN  OUTPATIENT/OBSERVATION GOALS TO BE MET BEFORE DISCHARGE:  1. Ruled out acute coronary syndrome (negative or stable Troponin):  No, stable trops  2. Pain Status: Pain free.  3. Appropriate provocative testing performed: No  - Stress Test Procedure: Lesiscan  - Interpretation of cardiac rhythm per telemetry tech: Afib CVR    4. Cleared by Consultants (if applicable):No  5. Return to near baseline physical activity: Yes  Discharge Planner Nurse   Safe discharge environment identified: Yes  Barriers to discharge: Yes       Entered by: Rosy Deleon 07/15/2019 1:08 PM     Please review provider order for any additional goals.   Nurse to notify provider when observation goals have been met and patient is ready for discharge.

## 2019-07-15 NOTE — DISCHARGE SUMMARY
[facility]  Hospitalist Discharge Summary       Date of Admission:  7/14/2019  Date of Discharge:  7/15/2019  Discharging Provider: Jonatan Ervin MD      Discharge Diagnoses     1. Chest Pain, ACS r/o , probably Musculoskeletal.    2. CAD with recent PCI x3 to RCA.    3. DM type 2.    4. Ischemic CM.    5. H/o CVA.    6. PAF.    Follow-ups Needed After Discharge   Follow-up Appointments     Follow-up and recommended labs and tests       Follow up with primary care provider, Physician No Ref-Primary, within 7   days for hospital follow- up.  Check BPM.  Follow up with cardiac rehab and cardiology as scheduled.             Unresulted Labs Ordered in the Past 30 Days of this Admission     No orders found for last 61 day(s).          Discharge Disposition   Discharged to home  Condition at discharge: Stable    Hospital Course      Jayro Elder is a 69 year old male admitted on 7/14/2019. He has a past medical history significant for hypertension, hyperlipidemia, diabetes mellitus, hypothyroidism, atrial fibrillation, sleep apnea, peripheral neuropathy, osteoarthritis, previous stroke, and coronary artery disease.  He was recently hospitalized for subacute non-ST elevation myocardial infarction.  He did have stents placed in his right coronary artery at that time.  He returns to the emergency room today because his blood pressures have been running higher than usual for the past 2 days.  He has had chest pain since his coronary angiogram.  Chest pain is unchanged.  He has been having occasional episodes of epistaxis.    1.  Hypertension.  Restart carvedilol, lisinopril, isosorbide mononitrate, and furosemide.  Have IV hydralazine if needed.  Have adjusted lisinopril and imdur doses and added aldactone.    2.  Coronary artery disease.  Minimal troponin elevation is likely due to recent coronary angiogram and stent placement.  Monitor on telemetry.    Continue clopidogrel, atorvastatin, carvedilol,  isosorbide mononitrate, and lisinopril. His aspirin was recently stopped as he is also on apixaban for atrial fibrillation.  Cardiology consult.    3.  Hyperlipidemia.  Restart atorvastatin.    4.  Hypothyroidism.  Restart levothyroxine.    5.  Epistaxis.  Episodes have been very short-lived.  Would continue his apixaban and clopidogrel for now.  Continue to monitor.  Start saline nose spray.    6.  Diabetes mellitus.  Restart Lantus.  Start NovoLog sliding scale.    7.  Atrial fibrillation.  Continue apixaban and carvedilol.    8.  GERD.  Restart pantoprazole.    Admitted under observation status. Troponin was elevated, but, flat. No SOB/pleuritic chest pain. Seen by cardiology and stress test did not show significant ischemia. Has chest wall that is ttp. Will discharge today with meds adjustment and f/u with cardiac rehab, PCP and cardiology.    Consultations This Hospital Stay   CARDIOLOGY IP CONSULT  CARE COORDINATOR IP CONSULT    Code Status   Full Code    Time Spent on this Encounter   I, Jonatan Ervin, personally saw the patient today and spent less than or equal to 30 minutes discharging this patient.       Jonatan Ervin MD  [facility]  ______________________________________________________________________    Physical Exam   Vital Signs: Temp: 96.4  F (35.8  C) Temp src: Oral BP: 142/79 Pulse: 75 Heart Rate: 76 Resp: 16 SpO2: 98 % O2 Device: None (Room air)    Weight: 244 lbs 4.8 oz    Gen - AAO x 3 in NAD.  Lungs - CTA B. No pleuritic chest pain.  Heart - RR,S1+S2 nml, no m/g/r. + ttp on anterior chest wall with palpation.   Abd - soft, NT, ND, + BS.  Ext - trace edema.       Primary Care Physician   Physician No Ref-Primary    Discharge Orders      Medication Therapy Management Referral      Follow-up and recommended labs and tests     Follow up with primary care provider, Physician No Ref-Primary, within 7 days for hospital follow- up.  Check BPM.  Follow up with cardiac rehab and  cardiology as scheduled.     Activity    Your activity upon discharge: activity as tolerated     Full Code     Diet    Follow this diet upon discharge: Orders Placed This Encounter      Moderate Consistent CHO Diet       Significant Results and Procedures   Results for orders placed or performed during the hospital encounter of 07/14/19   XR Chest 2 Views    Narrative    CHEST TWO VIEWS 7/14/2019 9:08 PM     HISTORY: Chest pain, stent 3 days ago.    COMPARISON: July 5, 2019     FINDINGS: There are no acute infiltrates. The cardiac silhouette is  not enlarged. Pulmonary vasculature is unremarkable.      Impression    IMPRESSION: No acute disease.    YE GAONA MD   NM MPI w Lexiscan    Narrative    GATED MYOCARDIAL PERFUSION SCINTIGRAPHY WITH INTRAVENOUS PHARMACOLOGIC  VASODILATATION LEXISCAN -ONE DAY STUDY     7/15/2019 12:26 PM  PORTER W BC  69 years  Male  1950.    Indication/Clinical History: Chest pain, coronary artery disease    Impression  1.  Myocardial perfusion imaging using single isotope technique  demonstrated transmural scar of the mid to distal anterior,  anteroseptal and apical region with no significant carrie-infarct  ischemia.   2. Gated images demonstrated mildly dilated left ventricle with severe  hypokinesis of the mid to distal anterior anteroseptal and apical  region.  The left ventricular systolic function is moderately reduced  ejection fraction 38%.  3. Compared to the prior study from 2018, there is no significant  change .    Procedure  Pharmacologic stress testing was performed with Lexiscan at a rate of  0.08 mg/ml rapid bolus injection, for 15 seconds, 0.4 mg/5ml  intravenously. Low-level exercise was not performed along with the  vasodilator infusion.  The heart rate was 60 at baseline and ml to  81 beats per minute during the Lexiscan infusion. The rest blood  pressure was 147/87 mmHg and was 140/74 mm Hg during Lexiscan  infusion. The patient experienced shortness of  breath and chest  pressure  during the test.    Myocardial perfusion imaging was performed at rest, approximately 45  minutes after the injection intravenously of 10.5 mCi of Tc-99m  Myoview. At peak pharmacologic effect, 10-20 seconds after Lexiscan,   the patient was injected intravenously with 32.8 mCi of  Tc-99m  Myoview. The post-stress tomographic imaging was performed  approximately 60 minutes after stress.    EKG Findings  The resting EKG demonstrated atrial fibrillation with right bundle  branch block. The stress EKG demonstrated no EKG changes suggestive of  ischemia.    Tomographic Findings  Overall, the study quality is technically difficult . On the stress  images, mid to distal anterior, anteroseptal apical defect with severe  count reduction is noted. On the rest images, this defect is seen with  no significant change . Gated images demonstrated mildly dilated left  ventricle with left ventricular diastolic volume of 140 cc. Mid to  distal since anteroseptal and anterior and apical severe hypokinesis  is noted. The left ventricular ejection fraction was calculated to be  38%. TID was absent.    LADI PETERSON MD       Discharge Medications   Current Discharge Medication List      START taking these medications    Details   spironolactone (ALDACTONE) 25 MG tablet Take 1 tablet (25 mg) by mouth daily  Qty: 30 tablet, Refills: 0    Associated Diagnoses: Essential hypertension         CONTINUE these medications which have CHANGED    Details   isosorbide mononitrate (IMDUR) 60 MG 24 hr tablet Take 1 tablet (60 mg) by mouth daily  Qty: 30 tablet, Refills: 0    Associated Diagnoses: Coronary artery disease involving native coronary artery of native heart with angina pectoris (H)      lisinopril (PRINIVIL/ZESTRIL) 40 MG tablet Take 1 tablet (40 mg) by mouth daily  Qty: 30 tablet, Refills: 0    Associated Diagnoses: Acute on chronic systolic congestive heart failure (H); Chronic atrial fibrillation (H)          CONTINUE these medications which have NOT CHANGED    Details   apixaban ANTICOAGULANT (ELIQUIS) 5 MG tablet Take 1 tablet (5 mg) by mouth 2 times daily    Associated Diagnoses: Chronic atrial fibrillation (H)      atorvastatin (LIPITOR) 40 MG tablet Take 1 tablet (40 mg) by mouth every evening  Qty: 90 tablet, Refills: 3    Associated Diagnoses: Cerebrovascular accident (CVA) due to embolism of left posterior cerebral artery (H)      carvedilol (COREG) 25 MG tablet Take 1 tablet (25 mg) by mouth 2 times daily (with meals)  Qty: 180 tablet, Refills: 3    Associated Diagnoses: Acute on chronic systolic congestive heart failure (H); Hypertension goal BP (blood pressure) < 140/90      Cholecalciferol (VITAMIN D3) 2000 units TABS Take 1 tablet by mouth daily      clopidogrel (PLAVIX) 75 MG tablet Take 1 tablet (75 mg) by mouth daily  Qty: 30 tablet, Refills: 0    Associated Diagnoses: Unstable angina (H)      furosemide (LASIX) 20 MG tablet Take 0.5 tablets (10 mg) by mouth 2 times daily  Qty: 30 tablet, Refills: 0    Associated Diagnoses: Unstable angina (H)      !! insulin aspart (NOVOLOG FLEXPEN) 100 UNIT/ML pen Inject 8 Units Subcutaneous daily (with lunch)      !! insulin aspart (NOVOLOG FLEXPEN) 100 UNIT/ML pen Inject 10 Units Subcutaneous daily (with dinner)      !! insulin aspart (NOVOLOG FLEXPEN) 100 UNIT/ML pen Inject Subcutaneous 3 times daily (with meals) Sliding Scale  Do not give sliding scale insulin if Pre-Meal BG less than 140.   For Pre-Meal:   - 200 give 2 units.    - 250 give 4 units.    - 300 give 6 units.    - 350 give 8 units.    - 400 give 10 units.      !! insulin aspart (NOVOLOG PEN) 100 UNIT/ML pen Inject 8 Units Subcutaneous daily (with breakfast)       insulin glargine (LANTUS SOLOSTAR PEN) 100 UNIT/ML pen Inject 36 Units Subcutaneous every morning    Associated Diagnoses: Type 2 diabetes mellitus with hyperosmolarity without coma, with long-term current use of  insulin (H)      insulin pen needle 31G X 6 MM Use  as directed.  Qty: 100 each, Refills: prn    Associated Diagnoses: Type 2 diabetes mellitus with diabetic peripheral angiopathy without gangrene, unspecified long term insulin use status      levothyroxine (SYNTHROID, LEVOTHROID) 137 MCG tablet Take 137 mcg by mouth daily   Qty: 90 tablet, Refills: 3      nitroglycerin (NITROSTAT) 0.4 MG sublingual tablet Place 1 tablet (0.4 mg) under the tongue every 5 minutes as needed for chest pain  Qty: 50 tablet, Refills: 0    Associated Diagnoses: Other chest pain      pantoprazole (PROTONIX) 40 MG enteric coated tablet Take 1 tablet (40 mg) by mouth every morning (before breakfast)  Qty: 30 tablet, Refills: 0    Associated Diagnoses: GERD (gastroesophageal reflux disease)      NONFORMULARY Topical patch for diabetes (blood glucose control) - changes every 4 days or so      !! order for DME Equipment being ordered: Other: surgical shoe male XL  Treatment Diagnosis: sp right 2nd toe amputaion  Qty: 1 Device, Refills: 0    Associated Diagnoses: S/P amputation of lesser toe, right (H)      !! order for DME Equipment being ordered: Walker Wheels () and Walker ()  Treatment Diagnosis: Impaired gait, heel WB bilaterally.  Qty: 1 each, Refills: 0    Associated Diagnoses: Diabetic ulcer of left midfoot associated with diabetes mellitus of other type, unspecified ulcer stage (H)       !! - Potential duplicate medications found. Please discuss with provider.      STOP taking these medications       potassium chloride ER (K-DUR/KLOR-CON M) 10 MEQ CR tablet Comments:   Reason for Stopping:             Allergies   Allergies   Allergen Reactions     No Known Allergies

## 2019-07-15 NOTE — PLAN OF CARE
PRIMARY DIAGNOSIS: CHEST PAIN  OUTPATIENT/OBSERVATION GOALS TO BE MET BEFORE DISCHARGE:  1. Ruled out acute coronary syndrome (negative or stable Troponin):  No  2. Pain Status: intermittent chest pain   3. Appropriate provocative testing performed: No  - Stress Test Procedure: NA  - Interpretation of cardiac rhythm per telemetry tech: Afib CVR    4. Cleared by Consultants (if applicable):No  5. Return to near baseline physical activity: Yes  Discharge Planner Nurse   Safe discharge environment identified: Yes  Barriers to discharge: Yes       Entered by: Anju Molina 07/15/2019 3:02 AM     Please review provider order for any additional goals.   Nurse to notify provider when observation goals have been met and patient is ready for discharge.

## 2019-07-15 NOTE — PROGRESS NOTES
Nuclear stress test shows no evidence of ischemia.  Nuclear stress test shows the old anterior myocardial infarct.  No evidence of ischemia in the inferior wall which is the area supplied by the right coronary artery which was stented recently.  The chest pain sounds distinctly noncardiac.  Blood pressure is borderline normal.  We will increase the lisinopril to 40 mg/day.  Blood pressure medications can be titrated on an outpatient basis.  Will sign off at this stage but please call if any other cardiac questions.  End of dictation thank you

## 2019-07-15 NOTE — PLAN OF CARE
PRIMARY DIAGNOSIS: CHEST PAIN  OUTPATIENT/OBSERVATION GOALS TO BE MET BEFORE DISCHARGE:  1. Ruled out acute coronary syndrome (negative or stable Troponin):  No  2. Pain Status: intermittent chest pain improved with Nitro x2   3. Appropriate provocative testing performed: No  - Stress Test Procedure: NA   - Interpretation of cardiac rhythm per telemetry tech: Afib CVR    4. Cleared by Consultants (if applicable):No  5. Return to near baseline physical activity: Yes  Discharge Planner Nurse   Safe discharge environment identified: Yes  Barriers to discharge: Yes       Entered by: Anju Molina 07/15/2019 5:16 AM     Please review provider order for any additional goals.   Nurse to notify provider when observation goals have been met and patient is ready for discharge.  A&O. VSS. Complains of intermittent chest pain rated 3-4/10. SL Nitro given x2 with some relief. . Pt has diabetic patch in place to abdomin- Ok'd by MD to leave for now. Tele Afib CVR. Up SBA. Plan for cardiology consult.

## 2019-07-15 NOTE — ED PROVIDER NOTES
"  History     Chief Complaint:  Hypertension    HPI   Jayro Elder is a 69 year old male, one week s/p three heart stents placed, with a history of cardiomyopathy, type II diabetes, CAD, and atrial fibrillation, on Eliquis, who presents for evaluation of nosebleeds and chest pain in the setting of hypertension. Since being discharged from the hospital on 7/9 after undergoing a coronary angiogram, he reports being compliant with his medications, including the new Plavix and the changed Lasix and Lisinopril doses. He also notes that he has been monitoring his blood pressures at home and they have been elevated to about 160-170 systolic for the last two days. In conjugation with these pressures, he reports chest pain that has been constant and associated with neck pain and occasional dyspnea. The persistence of these symptoms prompted him to present tonight. Here, he reports these pains are similar to what he felt before his prior surgeries, but not as intense. He also notes recurrent nosebleeds all day long and believes something is \"not right\". He denies any new fevers. Of note, he states the right femoral incision site is healing well.     Allergies:  NKDA    Medications:    Eliquis  Aspirin 81 mg  Atorvastatin  Coreg  Plavix  Lasix  Insulin (Novolog & Lantus)  Imdur  Synthroid   Lisinopril   Nitroglycerin  Protonix  K-dur    Past Medical History:    CAD  Cancer  Cardiomyopathy  Cellulitis  Diabetic ulcer  HTN  Osteoarthrosis  MI  Neuropathy  MARISOL  Substance abuse  Thyroid disease  Type II diabetes  Chronic A. Fib.   CHF  CVA  Unstable angina    Past Surgical History:    Right partial toe amputation x3  Laminectomy x3  Bunionectomy  Gingival surgery  Coronary angiogram  Left heart cath  Aspiration thrombectomy  PCI Stent drug eluting  Heart cath left  Irrigation & debridement - left foot  Left shoulder surgery  Total of 7 cardiac stents    Family History:    Colorectal cancer - sister  Thyroid disease, " cardiovascular disease, diabetes - Brother  Cancer - father  Arthritis, thyroid disease, diabetes - Mother    Social History:  Marital Status:   [2]  Wife at bedside.   Former smoker.   Positive for alcohol use.  Negative for drug use.      Review of Systems   Constitutional: Negative for fever.   HENT: Positive for nosebleeds.    Respiratory: Positive for shortness of breath (occasional).    Cardiovascular: Positive for chest pain.   Musculoskeletal: Positive for neck pain.   All other systems reviewed and are negative.    Physical Exam     Patient Vitals for the past 24 hrs:   BP Temp Temp src Pulse Heart Rate Resp SpO2   07/14/19 2115 (!) 155/96 -- -- 78 78 30 98 %   07/14/19 2100 144/89 -- -- 76 77 8 97 %   07/14/19 2045 (!) 146/92 -- -- 79 76 17 98 %   07/14/19 2030 (!) 156/101 -- -- 78 81 25 98 %   07/14/19 2015 (!) 165/108 -- -- 79 79 13 99 %   07/14/19 2013 (!) 165/108 98.2  F (36.8  C) Oral 79 -- 20 100 %      Physical Exam  Gen: well appearing, in no acute distress  HEENT:  mmm, no rhinorrhea  Neck: supple, no abnormal swelling  Lungs:  CTAB,  no resp distress  CV: rrr, no m/r/g, ppi  Abd: soft, nontender, nondistended, no rebound/masses/guarding/hsm  Ext: no peripheral edema  Skin: warm, dry, well perfused, no rashes/bruising/lesions on exposed skin  Neuro: alert, MAEE, no gross motor or sensory deficits, gait stable  Psych: Normal mood, normal affect    Emergency Department Course   ECG:  Indication: HTN  Time: 2011  Vent. Rate 79 bpm. WA interval *. QRS duration 140. QT/QTc 428/490. P-R-T axis * -40 -19.    Atrial fibrillation with a competing junctional pacemaker.  Left axis deviation.   RBBB  Anteroseptal infarct, age undetermined.  No significant change compared to EKG dated 7/8/19. Read time: 2015.    Indication: Repeat  Time: 2131  Vent. Rate 78 bpm. WA interval *. QRS duration 144. QT/QTc 452/515. P-R-T axis * -43 -35.    Atrial fibrillation.  Left axis deviation.  RBBB  Anteroseptal  infarct, age undetermined.  No significant change compared to EKG dated 19. Read time: .    Imaging:  Radiology findings were communicated with the patient and family who voiced understanding of the findings.  XR Chest 2 views:   No acute disease, as per radiology.     Laboratory:  CBC: WBC: 6.0, HGB: 11.3 (L), PLT: 220  BMP: Glucose 225 (H), o/w WNL (Creatinine: 1.09)   Troponin: 0.125 (HH)     Interventions:   Nitrostat 0.4 mg Sublingual   Coreg, 25 mg, PO   Eliquis, 5 mg, PO    Emergency Department Course:  Nursing notes and vitals reviewed.    EKG obtained in the ED, see results above.     Medication administered, as documented above.     : I performed an exam of the patient as documented above.     IV was inserted and blood was drawn for laboratory testing, results above. Medicine administered, as documented above.    The patient was sent for a chest x-ray while in the emergency department, results above.     : Patient rechecked and updated.     Repeat EKG obtained in the ED, see results above.     : I spoke with Dr. Baumann of the hospitalist service regarding patient's presentation, findings, and plan of care. He is accepting of the patient for admission.     I discussed the treatment plan with the patient. They expressed understanding of this plan and consented to admission. I discussed the patient with Dr. Baumann, who will admit the patient to a medical bed for further evaluation and treatment.    I personally reviewed the laboratory and imaging results with the Patient and spouse and answered all related questions prior to admission.     Impression & Plan      Medical Decision Makin-year-old male vasculopathic history recently had stents placed in his right coronary artery this past Monday presenting with elevated blood pressures at home as well as upper chest and neck discomfort similar to what led to his admission back on .  EKG here shows no acute ischemic  changes or new concerning arrhythmia.  Basic blood tests and chest x-ray are unremarkable his troponin is mildly elevated this is possibly still downtrending from his recent angiogram will need serial biomarkers to make sure it is not trending up because of a new stent occlusion or ischemic disease.  We will admit him to the hospital for further evaluation and management he is on apixaban and Plavix stopped aspirin on Saturday as planned from cardiology.  He was also given his evening blood pressure medicine.  In the absence of new EKG changes or clearly escalating clinical picture would not put him back on the aspirin or contact interventional cardiology for emergent revascularization.    Critical Care time:  none    Diagnosis:    ICD-10-CM    1. Hypertension, unspecified type I10    2. Acute chest pain R07.9    3. Elevated troponin R74.8        Disposition:  Admitted to the hospital under the care of Dr. Pastor Nelsonibe Disclosure:  I, Miracle Trinidad, am serving as a scribe on 7/14/2019 at 8:23 PM to personally document services performed by Hernán Davis MD based on my observations and the provider's statements to me.      Miracle Trinidad  7/14/2019   [facility] EMERGENCY DEPARTMENT       Hernán Davis MD  07/14/19 0793

## 2019-07-15 NOTE — ED TRIAGE NOTES
Had 3 heart stents placed on Monday.  Increasing blood pressure for last two days.  Also reports chest, neck and arm pain since procedure.

## 2019-07-15 NOTE — PHARMACY-ADMISSION MEDICATION HISTORY
Admission medication history interview status for this patient is complete. See [facility] admission navigator for allergy information, prior to admission medications and immunization status.     Medication history interview source(s):Patient  Medication history resources (including written lists, pill bottles, clinic record): med list  Primary pharmacy[facility]    Changes made to PTA medication list:  Added: -  Deleted: aspirin  Changed: -    Actions taken by pharmacist (provider contacted, etc):None     Additional medication history information:Used discharge med list from 7/8/19. Pt said that the only change was a discontinuation of asa.    Medication reconciliation/reorder completed by provider prior to medication history? No    Do you take OTC medications (eg tylenol, ibuprofen, fish oil, eye/ear drops, etc)? N(Y/N)    For patients on insulin therapy: Y (Y/N)  Lantus/levemir/NPH/Mix 70/30 dose:   (Y/N) (see Med list for doses)   Sliding scale Novolog Y  If Yes, do you have a baseline novolog pre-meal dose:  units with meals  Patients eat three meals a day:   Y  How many episodes of hypoglycemia do you have per week: __0_____  How many missed doses do you have per week: _0_____  How many times do you check your blood glucose per day: ___2-3____  Do you have a Continuous glucose monitor (CGM)   N (remind pt that not approved for hospital use)   Any Barriers to therapy - Be specific :  cost of medications, comfortable with giving injections (if applicable), comfortable and confident with current diabetes regimen: N ______________      Prior to Admission medications    Medication Sig Last Dose Taking? Auth Provider   apixaban ANTICOAGULANT (ELIQUIS) 5 MG tablet Take 1 tablet (5 mg) by mouth 2 times daily 7/14/2019 at Unknown time Yes Gill Doty PA   atorvastatin (LIPITOR) 40 MG tablet Take 1 tablet (40 mg) by mouth every evening 7/13/2019 at Unknown time Yes Gill Doty PA   carvedilol (COREG) 25 MG tablet Take 1  tablet (25 mg) by mouth 2 times daily (with meals) 7/14/2019 at Unknown time Yes Gill Doty PA   Cholecalciferol (VITAMIN D3) 2000 units TABS Take 1 tablet by mouth daily 7/14/2019 at Unknown time Yes Unknown, Entered By History   clopidogrel (PLAVIX) 75 MG tablet Take 1 tablet (75 mg) by mouth daily 7/14/2019 at Unknown time Yes Meenu Catherine MD   furosemide (LASIX) 20 MG tablet Take 0.5 tablets (10 mg) by mouth 2 times daily 7/14/2019 at Unknown time Yes Meenu Catherine MD   insulin aspart (NOVOLOG FLEXPEN) 100 UNIT/ML pen Inject 8 Units Subcutaneous daily (with lunch) 7/14/2019 at Unknown time Yes Unknown, Entered By History   insulin aspart (NOVOLOG FLEXPEN) 100 UNIT/ML pen Inject 10 Units Subcutaneous daily (with dinner) 7/14/2019 at Unknown time Yes Unknown, Entered By History   insulin aspart (NOVOLOG FLEXPEN) 100 UNIT/ML pen Inject Subcutaneous 3 times daily (with meals) Sliding Scale  Do not give sliding scale insulin if Pre-Meal BG less than 140.   For Pre-Meal:   - 200 give 2 units.    - 250 give 4 units.    - 300 give 6 units.    - 350 give 8 units.    - 400 give 10 units. 7/14/2019 at Unknown time Yes Unknown, Entered By History   insulin aspart (NOVOLOG PEN) 100 UNIT/ML pen Inject 8 Units Subcutaneous daily (with breakfast)  7/14/2019 at Unknown time Yes Unknown, Entered By History   insulin glargine (LANTUS SOLOSTAR PEN) 100 UNIT/ML pen Inject 36 Units Subcutaneous every morning 7/14/2019 at Unknown time Yes Meenu Catherine MD   insulin pen needle 31G X 6 MM Use  as directed. 7/14/2019 at Unknown time Yes Vinicio Cook MD   isosorbide mononitrate (IMDUR) 30 MG 24 hr tablet Take 1 tablet (30 mg) by mouth daily 7/14/2019 at Unknown time Yes Johnathan Mckeon MD   levothyroxine (SYNTHROID, LEVOTHROID) 137 MCG tablet Take 137 mcg by mouth daily  7/14/2019 at Unknown time Yes Henrry Figueroa PA-C   lisinopril (PRINIVIL/ZESTRIL) 10 MG tablet Take 1 tablet (10 mg)  by mouth daily 7/14/2019 at Unknown time Yes Meenu Catherine MD   nitroglycerin (NITROSTAT) 0.4 MG sublingual tablet Place 1 tablet (0.4 mg) under the tongue every 5 minutes as needed for chest pain Past Week at Unknown time Yes Davion Cordova PA-C   pantoprazole (PROTONIX) 40 MG enteric coated tablet Take 1 tablet (40 mg) by mouth every morning (before breakfast) 7/14/2019 at Unknown time Yes Ana Frankel MD   potassium chloride ER (K-DUR/KLOR-CON M) 10 MEQ CR tablet Take 1 tablet (10 mEq) by mouth 2 times daily 7/14/2019 at Unknown time Yes Justin Tomlin MD   NONFORMULARY Topical patch for diabetes (blood glucose control) - changes every 4 days or so Unknown at Unknown time  Unknown, Entered By History   order for DME Equipment being ordered: Other: surgical shoe male XL  Treatment Diagnosis: sp right 2nd toe amputaion Unknown at Unknown time  Ryan Bhagat DPM   order for DME Equipment being ordered: Walker Wheels () and Walker ()  Treatment Diagnosis: Impaired gait, heel WB bilaterally. Unknown at Unknown time  Herb Zurita III, MD

## 2019-07-16 ENCOUNTER — TELEPHONE (OUTPATIENT)
Dept: CARDIOLOGY | Facility: CLINIC | Age: 69
End: 2019-07-16

## 2019-07-16 NOTE — TELEPHONE ENCOUNTER
Patient was evaluated by cardiology while inpatient for chest pain (stress test showed no evidence of ischemia). Called patient to discuss any post hospital d/c questions he may have, review medication changes, and confirm f/u appts. Patient denied any questions regarding new medications or changes to PTA medications. Patient denied any SOB, chest pain, or light headedness. RN confirmed with patient that we would like him to schedule F/U apt with [facility] within the next 1-2 weeks (RN provided patient with direct phone number for scheduling). Patient advised to call clinic with any cardiac related questions or concerns. Patient verbalized understanding and agreed with plan.             7/15/19 cardiology progress note  Nuclear stress test shows no evidence of ischemia.  Nuclear stress test shows the old anterior myocardial infarct.  No evidence of ischemia in the inferior wall which is the area supplied by the right coronary artery which was stented recently.  The chest pain sounds distinctly noncardiac.  Blood pressure is borderline normal.  We will increase the lisinopril to 40 mg/day.  Blood pressure medications can be titrated on an outpatient basis.  Will sign off at this stage but please call if any other cardiac questions.  End of dictation thank you  Cardiology consult dictated.  Patient with recent stenting of the right coronary artery a week ago and prior anterior myocardial infarct and prior stenting of the left anterior descending artery and circumflex arteries who presents with atypical chest discomfort.  Also presents with blood pressure being consistently in the 150s.  Troponin is raised but flat.  EKG shows no ischemic changes.  At the end of the coronary intervention a week ago he was completely revascularized with no flow-limiting lesions.  States that he has had discomfort since the intervention.  Will increase the dose of lisinopril to 30 mg/day.  Increase isosorbide mononitrate to 60 mg/day.  We will  add in spironolactone 25 mg/day which would be a useful medication for both blood pressure control and also as the patient has moderately reduced left ventricular systolic function post anterior myocardial infarct and that is a class I indication for the introduction of spironolactone.  We will follow his basic metabolic profile closely.  We will stop the potassium replacement in the presence of spironolactone.  Will order Lexiscan study today to see if there is any evidence of ischemia.  We will continue the Plavix and spironolactone.  Patient has some dental work to do but this will have to be postponed for approximately 6 months unless pain becomes intolerable

## 2019-07-16 NOTE — PLAN OF CARE
Patient discharged home via private car, transported via wheelchair with volunteer services.  Patient verbalized and received copy of discharge instructions.  Patient will  discharge medications at [facility] pharmacy.  Personal belongings gathered and sent with patient.  VSS. IV removed prior to discharge.

## 2019-07-17 ENCOUNTER — TELEPHONE (OUTPATIENT)
Dept: FAMILY MEDICINE | Facility: CLINIC | Age: 69
End: 2019-07-17

## 2019-07-17 NOTE — TELEPHONE ENCOUNTER
"ED / Discharge Outreach Protocol    Patient Contact    Attempt # 1    Was call answered?  Yes.  \"May I please speak with <patient name>\"  Is patient available?   Yes    Hospital/TCU/ED for chronic condition Discharge Protocol    \"Hi, my name is Mary Jo Burdick, a registered nurse, and I am calling from [facility].  I am calling to follow up and see how things are going for you after your recent emergency visit/hospital/TCU stay.\"    Tell me how you are doing now that you are home?\" fine.       Discharge Instructions    \"Let's review your discharge instructions.  What is/are the follow-up recommendations?  Pt. Response: f/u cardio. F/u pcp- pt does not think this is necessary.     \"Has an appointment with your primary care provider been scheduled?\"   Pt does not feel this is necessary. Adv that we usu see PCP to update chart, go over questions, interpret tests, etc.     \"When you see the provider, I would recommend that you bring your medications with you.\"    Medications    \"Tell me what changed about your medicines when you discharged?\"    Changes to chronic meds?    2 or more - Epic MTM referral needed    \"What questions do you have about your medications?\"    None     New diagnoses of heart failure, COPD, diabetes, or MI?    Seeing Cardio.               Medication reconciliation completed? Yes    Post Discharge Medication Reconciliation Status: discharge medications reconciled and changed, per note/orders (see AVS).      Was MTM referral placed (*Make sure to put transitions as reason for referral)?   No    Call Summary    \"What questions or concerns do you have about your recent visit and your follow-up care?\"     none    \"If you have questions or things don't continue to improve, we encourage you contact us through the main clinic number (give number).  Even if the clinic is not open, triage nurses are available 24/7 to help you.     We would like you to know that our clinic has extended hours (provide " "information).  We also have urgent care (provide details on closest location and hours/contact info)\"      \"Thank you for your time and take care!\"    Mary Jo FLORES RN               "

## 2019-07-17 NOTE — TELEPHONE ENCOUNTER
Please contact patient for In-patient follow up.  There are no phone numbers on file.    Visit date: 7//  Diagnosis listed:Hypertension, Unspecified Type, Essential Hypertension  MON 15-JUL-2019 0 / 5  17:34  Number of visits in past 12 months:/

## 2019-07-23 ENCOUNTER — HOSPITAL ENCOUNTER (OUTPATIENT)
Dept: CARDIAC REHAB | Facility: CLINIC | Age: 69
End: 2019-07-23
Attending: FAMILY MEDICINE
Payer: COMMERCIAL

## 2019-07-23 PROCEDURE — 93797 PHYS/QHP OP CAR RHAB WO ECG: CPT | Mod: XU

## 2019-07-23 PROCEDURE — 93798 PHYS/QHP OP CAR RHAB W/ECG: CPT

## 2019-07-23 PROCEDURE — 40000116 ZZH STATISTIC OP CR VISIT

## 2019-07-23 PROCEDURE — 40000575 ZZH STATISTIC OP CARDIAC VISIT #2

## 2019-07-24 ENCOUNTER — OFFICE VISIT (OUTPATIENT)
Dept: CARDIOLOGY | Facility: CLINIC | Age: 69
End: 2019-07-24
Payer: COMMERCIAL

## 2019-07-24 ENCOUNTER — OFFICE VISIT (OUTPATIENT)
Dept: PODIATRY | Facility: CLINIC | Age: 69
End: 2019-07-24
Payer: COMMERCIAL

## 2019-07-24 VITALS
SYSTOLIC BLOOD PRESSURE: 110 MMHG | HEART RATE: 72 BPM | DIASTOLIC BLOOD PRESSURE: 70 MMHG | BODY MASS INDEX: 29.7 KG/M2 | HEIGHT: 76 IN | WEIGHT: 243.9 LBS

## 2019-07-24 VITALS
HEIGHT: 76 IN | BODY MASS INDEX: 29.71 KG/M2 | SYSTOLIC BLOOD PRESSURE: 112 MMHG | DIASTOLIC BLOOD PRESSURE: 62 MMHG | WEIGHT: 244 LBS

## 2019-07-24 DIAGNOSIS — I50.22 CHRONIC SYSTOLIC CONGESTIVE HEART FAILURE (H): Primary | ICD-10-CM

## 2019-07-24 DIAGNOSIS — Z89.421 H/O AMPUTATION OF LESSER TOE, RIGHT (H): ICD-10-CM

## 2019-07-24 DIAGNOSIS — M20.41 HAMMER TOES OF BOTH FEET: ICD-10-CM

## 2019-07-24 DIAGNOSIS — E11.42 DIABETIC POLYNEUROPATHY ASSOCIATED WITH TYPE 2 DIABETES MELLITUS (H): Primary | ICD-10-CM

## 2019-07-24 DIAGNOSIS — L97.522 ULCER OF LEFT FOOT, WITH FAT LAYER EXPOSED (H): ICD-10-CM

## 2019-07-24 DIAGNOSIS — Z95.5 S/P CORONARY ARTERY STENT PLACEMENT: ICD-10-CM

## 2019-07-24 DIAGNOSIS — I24.9 ACUTE CORONARY SYNDROME (H): ICD-10-CM

## 2019-07-24 DIAGNOSIS — M20.42 HAMMER TOES OF BOTH FEET: ICD-10-CM

## 2019-07-24 DIAGNOSIS — L84 PRE-ULCERATIVE CALLUSES: ICD-10-CM

## 2019-07-24 PROCEDURE — 99212 OFFICE O/P EST SF 10 MIN: CPT | Mod: 25 | Performed by: PODIATRIST

## 2019-07-24 PROCEDURE — 11042 DBRDMT SUBQ TIS 1ST 20SQCM/<: CPT | Performed by: PODIATRIST

## 2019-07-24 PROCEDURE — 99214 OFFICE O/P EST MOD 30 MIN: CPT | Mod: 24 | Performed by: INTERNAL MEDICINE

## 2019-07-24 ASSESSMENT — MIFFLIN-ST. JEOR
SCORE: 1972.82
SCORE: 1973.28

## 2019-07-24 NOTE — LETTER
7/24/2019    Physician No Ref-Primary  No address on file    RE: Jayro W Jael       Dear Colleague,    I had the pleasure of seeing Jayro W Jael in the [facility].    HPI and Plan:   This 69-year-old gentleman is seen after recent hospitalization with acute coronary syndrome.  This resulted in right coronary mid and distal stenting.    He has a long cardiac history including anterior MI in 2005 with proximal LAD stenting.  Repeat LAD stenting in 2009 and distal LAD stenting in 2013.  He presented with angina and decreased ejection fraction 2017 and had stenting of a large marginal branch, with widely patent LAD stents at that time.  There was only mild to moderate disease in the right coronary.  He then presented this admission several weeks ago with 3 weeks or more of the worsening dyspnea on exertion and exertional marked weakness and arm pain.  Troponins were negative but he proceeded angiography, which I personally reviewed, and which demonstrated total occlusion of the mid right with diffuse severe distal right disease.  There were left-to-right collaterals however.  This was successfully recanalized with stents placed throughout the mid and distal RCA.  LAD and circumflex stents were widely patent.  The RCA is at most a modest sized territory with a modest PDA and small posterior lateral vessels territories.  Echocardiography showed no change in his underlying ejection fraction which is been 35 to 40%.  He did not have heart failure exacerbation.  He had a severe heart failure exacerbation at the end of this year but this is associated with severe acute renal insufficiency and likely most of the volume overload was from that.  With change in his antibiotics and diuresis he has volume overload and renal insufficiency I personally resolved.  Current creatinine is back down to his baseline of around 1.0.  For the last 5 months his weight has fluctuated right around 244  pounds which is where he is at currently.  With this he is not having dyspnea on exertion, orthopnea, edema.  He denies orthostasis, syncope or near syncope.  He has chronic atrial fibrillation which is found after he presented with an acute CVI.  He is on apixaban.  He was on triple therapy for about a week, is now on apixaban and clopidogrel.  No current bleeding issues.  He notes he has some dental work and extractions and wants to know if he can hold his Plavix and apixaban.  I emphatically said he cannot stop his Plavix for at least 3 months and preferably 6 months.  He will discuss this further with oral surgery.  He is in cardiac rehab but he has to have a  and is difficult for his  to get him to cardiac rehab.  He has been through this in the past and is tolerating it well.  He has a gym membership and can get to that more easily so I indicated that if he is asymptomatic at cardiac rehab he could continue doing that on his own at home and at the gym.  He knows all the educational issues that cardiac rehab generally provides.     Exam today is detailed below.  In summary:  Blood pressure well controlled at 110/70.  Pulse around 70 and irregular.  Weight stable.   Lungs-clear normal respiratory effort  Cardiac-no JVD or HJR.  No heave or significant murmur.    Extremities-no right lower extremity edema.  Trace left edema.    Impression/plan    1.-Recent acute coronary syndrome.  No evidence of significant infarction with no change in ejection fraction.  Stable post stenting of culprit lesion in the mid and distal RCA.  There is been significant progression of that disease apparently since 2017, which was his last angiogram that showed only mild to moderate RCA disease.  However he is on intensive risk factor intervention with controlled blood pressure, intense statin dose with LDL less than 50, chronic anticoagulation and previously chronic antiplatelet therapy although that was discontinued at the  beginning of this year.    2-status post coronary stenting.  Tolerating double therapy with apixaban and clopidogrel.  If he absolutely needs oral surgery and it cannot be done on clopidogrel or needs to be done before 3 to 6 months I would consider bridging with some Lovenox.  He will discuss further with the oral surgeon.    3-ischemic cardiomyopathy.  Stable.  Current without evidence of volume overload.  Has not really had heart failure in the absence of severe renal insufficiency acutely.  Currently no significant renal insufficiency.  On beta-blocker and vasodilator therapy and mineralocorticoid antagonist.  Blood pressure controlled..      I will continue his current regimen.  He has difficulty getting to clinic because he has to have a  last to be off of work for that.  As long as he has no recurrent symptoms and weight stays stable and no heart failure symptoms we will have him reassessed in 4 to 6 months.  He understands the importance of contacting us should any of these symptoms recur or develop.    Orders Placed This Encounter   Procedures     Follow-Up with [facility] visit       No orders of the defined types were placed in this encounter.      There are no discontinued medications.      Encounter Diagnoses   Name Primary?     Chronic systolic congestive heart failure (H) Yes     S/P coronary artery stent placement      Acute coronary syndrome (H)        CURRENT MEDICATIONS:  Current Outpatient Medications   Medication Sig Dispense Refill     apixaban ANTICOAGULANT (ELIQUIS) 5 MG tablet Take 1 tablet (5 mg) by mouth 2 times daily       atorvastatin (LIPITOR) 40 MG tablet Take 1 tablet (40 mg) by mouth every evening 90 tablet 3     carvedilol (COREG) 25 MG tablet Take 1 tablet (25 mg) by mouth 2 times daily (with meals) 180 tablet 3     Cholecalciferol (VITAMIN D3) 2000 units TABS Take 1 tablet by mouth daily       clopidogrel (PLAVIX) 75 MG tablet Take 1 tablet (75 mg) by mouth daily 30  tablet 0     furosemide (LASIX) 20 MG tablet Take 0.5 tablets (10 mg) by mouth 2 times daily (Patient taking differently: Take 20 mg by mouth daily ) 30 tablet 0     insulin aspart (NOVOLOG FLEXPEN) 100 UNIT/ML pen Inject 8 Units Subcutaneous daily (with lunch)       insulin aspart (NOVOLOG FLEXPEN) 100 UNIT/ML pen Inject 10 Units Subcutaneous daily (with dinner)       insulin aspart (NOVOLOG FLEXPEN) 100 UNIT/ML pen Inject Subcutaneous 3 times daily (with meals) Sliding Scale  Do not give sliding scale insulin if Pre-Meal BG less than 140.   For Pre-Meal:   - 200 give 2 units.    - 250 give 4 units.    - 300 give 6 units.    - 350 give 8 units.    - 400 give 10 units.       insulin aspart (NOVOLOG PEN) 100 UNIT/ML pen Inject 8 Units Subcutaneous daily (with breakfast)        insulin glargine (LANTUS SOLOSTAR PEN) 100 UNIT/ML pen Inject 36 Units Subcutaneous every morning       insulin pen needle 31G X 6 MM Use  as directed. 100 each prn     isosorbide mononitrate (IMDUR) 60 MG 24 hr tablet Take 1 tablet (60 mg) by mouth daily 30 tablet 0     levothyroxine (SYNTHROID, LEVOTHROID) 137 MCG tablet Take 137 mcg by mouth daily  90 tablet 3     lisinopril (PRINIVIL/ZESTRIL) 40 MG tablet Take 1 tablet (40 mg) by mouth daily 30 tablet 0     nitroglycerin (NITROSTAT) 0.4 MG sublingual tablet Place 1 tablet (0.4 mg) under the tongue every 5 minutes as needed for chest pain 50 tablet 0     NONFORMULARY Topical patch for diabetes (blood glucose control) - changes every 4 days or so       order for DME Equipment being ordered: Other: surgical shoe male XL  Treatment Diagnosis: sp right 2nd toe amputaion 1 Device 0     order for DME Equipment being ordered: Walker Wheels () and Walker ()  Treatment Diagnosis: Impaired gait, heel WB bilaterally. 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     Cardiomyopathy (H)      Cellulitis of left lower extremity 3/13/2018     Cerebral infarction (H)     eye sight decreased in peripheral of right side and blurry     Diabetic ulcer of left midfoot associated with type 2 diabetes mellitus, with fat layer exposed (H) 3/13/2018     Essential hypertension, benign 11/13/2002     Generalized osteoarthrosis, unspecified site 11/13/2002     Microalbuminuria 3/13/2018    X1     Myocardial infarction (H)      Neuropathy      Sepsis (H)      Sleep apnea     doesn't use it     Substance abuse (H)      Syncope, unspecified syncope type 3/13/2018     Thyroid disease     takes medicine     Tobacco use disorder 11/13/2002     Type 2 diabetes mellitus with diabetic peripheral angiopathy without gangrene, unspecified long term insulin use status 2005       PAST SURGICAL HISTORY:  Past Surgical History:   Procedure Laterality Date     AMPUTATE TOE(S) Right 1/6/2019    Procedure: Right open second toe partial amputation;  Surgeon: Sabino Garcia DPM;  Location: [facility] OR     AMPUTATE TOE(S) Right 1/8/2019    Procedure: 1.  Revision right second toe amputation with resection of distal half of proximal phalanx with full closure.    2.  Debridement of callus/preulcerative lesion, distal left second toe and plantar left first metatarsophalangeal joint.;  Surgeon: Ryan Bhagat DPM;  Location: [facility] OR     AMPUTATE TOE(S) Right 3/12/2019    Procedure: Partial right fourth toe amputation.;  Surgeon: Ryan Bhagat DPM;  Location: [facility] OR     AMPUTATE TOE(S) Right 5/6/2019    Procedure: Right partial third toe  amputation;  Surgeon: Татьяна Mckeon DPM, Podiatry/Foot and Ankle Surgery;  Location: [facility] OR     ANGIOGRAM  6/29/05    subtotal occ.mid LAD,SUSAN mid LAD     ANGIOGRAM  7/05    mild CAD,patent stent,no flow-limiting lesions,sev.LV dysf.LAD enlarged     ANGIOGRAM  2/09    Sev.single vessel disease,Mod LV dysf.distal LAD 90%,70-75% mid lad just before prev stent,SUSAN to prox.mid LAD, endeavor to distal LAD     ANGIOGRAM  11/13/13    restenosis, stent LAD     BACK SURGERY      back surgery x3     C NONSPECIFIC PROCEDURE      Laminectomy x 3 - (1983 x 2 & 1990)     C NONSPECIFIC PROCEDURE Bilateral 1998    Bunionectomy     C NONSPECIFIC PROCEDURE  1959    Gingival surgery at age 9     CV CORONARY ANGIOGRAM N/A 7/8/2019    Procedure: Coronary Angiogram;  Surgeon: Jose Alfredo Crockett MD;  Location:  HEART CARDIAC CATH LAB     CV LEFT HEART CATH N/A 7/8/2019    Procedure: Left Heart Cath;  Surgeon: Jose Alfredo Crockett MD;  Location:  HE[facility]ARD[facility] CATH LAB     CV PCI ASPIRATION THROMECTOMY N/A 7/8/2019    Procedure: PCI Aspiration Thrombectomy;  Surgeon: Jose Alfredo Crockett MD;  Location:  HEART [facility]ARD[facility]AC CATH LAB     CV PCI STENT DRUG ELUTING N/A 7/8/2019    Procedure: PCI Stent Drug Eluting;  Surgeon: Jose Alfredo Crockett MD;  Location:  HEART CARDIAC CATH LAB     HEART CATH LEFT HEART CATH  06/13/2017    SUSAN to OM3     INCISION AND DRAINAGE TOE, COMBINED Bilateral 9/11/2018    Procedure: COMBINED INCISION AND DRAINAGE TOE;  1) Irrigation and debridement ulcer left great toe  2) Amputation 3rd toe right foot at distal interphalangeal joint level;  Surgeon: Miki Harp MD;  Location:  OR     IRRIGATION AND DEBRIDEMENT FOOT, COMBINED Left 3/12/2019    Procedure: Debridement of left foot ulcer sub first MPJ 2 x 2.5 cm down to and including subcutaneous tissue, callus debridement for preulcerative lesion, left second toe.;  Surgeon: Ryan Bhagat DPM;  Location:  OR     ORTHOPEDIC SURGERY      [facility]  surgery both feet     ORTHOPEDIC SURGERY      left shoulder     SOFT TISSUE SURGERY      debridement of toe numerous time     VASCULAR SURGERY      7 stents in heart       FAMILY HISTORY:  Family History   Problem Relation Age of Onset     Cancer - colorectal Sister      Thyroid Disease Brother      Cardiovascular Brother      Diabetes Brother      Cancer Father         tumor in chest and throat     Arthritis Mother      Thyroid Disease Mother      Diabetes Mother      Glaucoma No family hx of      Macular Degeneration No family hx of        SOCIAL HISTORY:  Social History     Socioeconomic History     Marital status:      Spouse name: None     Number of children: None     Years of education: None     Highest education level: None   Occupational History     None   Social Needs     Financial resource strain: None     Food insecurity:     Worry: None     Inability: None     Transportation needs:     Medical: None     Non-medical: None   Tobacco Use     Smoking status: Former Smoker     Packs/day: 1.00     Years: 25.00     Pack years: 25.00     Types: Cigarettes     Last attempt to quit: 2005     Years since quittin.0     Smokeless tobacco: Never Used   Substance and Sexual Activity     Alcohol use: No     Alcohol/week: 2.4 oz     Types: 4 Shots of liquor per week     Frequency: Never     Drug use: No     Sexual activity: Yes     Partners: Female   Lifestyle     Physical activity:     Days per week: None     Minutes per session: None     Stress: None   Relationships     Social connections:     Talks on phone: None     Gets together: None     Attends Congregational service: None     Active member of club or organization: None     Attends meetings of clubs or organizations: None     Relationship status: None     Intimate partner violence:     Fear of current or ex partner: None     Emotionally abused: None     Physically abused: None     Forced sexual activity: None   Other Topics Concern     Parent/sibling  "w/ CABG, MI or angioplasty before 65F 55M? Yes      Service Not Asked     Blood Transfusions Not Asked     Caffeine Concern No     Comment: 3 cups daily     Occupational Exposure Not Asked     Hobby Hazards Not Asked     Sleep Concern Not Asked     Stress Concern Not Asked     Weight Concern Not Asked     Special Diet Yes     Comment: watching carb intake     Back Care Not Asked     Exercise No     Comment: some walking     Bike Helmet Not Asked     Seat Belt Not Asked     Self-Exams Not Asked   Social History Narrative     None       Review of Systems:  Skin:  Negative       Eyes:  Positive for glasses;visual blurring loss of vision on the right side  ENT:  Positive for   runny nose  Respiratory:  Positive for sleep apnea does not use machine   Cardiovascular:    Positive for;palpitations    Gastroenterology: Negative      Genitourinary:  Positive for nocturia    Musculoskeletal:  Positive for arthritis;foot pain toe amputation, restless legs - sleeps poorly bc of this; shoulder pain  Neurologic:  Positive for headaches;numbness or tingling of feet;numbness or tingling of hands off balance  Psychiatric:  Negative      Heme/Lymph/Imm:  Positive for easy bruising    Endocrine:  Positive for diabetes;thyroid disorder      Physical Exam:  Vitals: /70 (BP Location: Right arm, Patient Position: Chair, Cuff Size: Adult Large)   Pulse 72   Ht 1.93 m (6' 4\")   Wt 110.6 kg (243 lb 14.4 oz)   BMI 29.69 kg/m       Constitutional:  cooperative, alert and oriented, well developed, well nourished, in no acute distress        Skin:  warm and dry to the touch          Head:  normocephalic        Eyes:  pupils equal and round;sclera white;EOMS intact        Lymph:      ENT:  no pallor or cyanosis        Neck:  JVP normal(no HJR)        Respiratory:  clear to auscultation;normal symmetry;normal respiratory excursion         Cardiac: no murmurs, gallops or rubs detected;normal S1 and S2 irregularly irregular rhythm   "         mildly irregular  not assessed this visit                                        GI:  abdomen soft;non-tender;no bruits obese      Extremities and Muscular Skeletal:          LLE edema;trace      Neurological:  no gross motor deficits        Psych:  affect appropriate, oriented to time, person and place        CC  No referring provider defined for this encounter.                Thank you for allowing me to participate in the care of your patient.      Sincerely,     Justin Tomlin MD     [facility]    cc:   No referring provider defined for this encounter.

## 2019-07-24 NOTE — PATIENT INSTRUCTIONS
Thank you for choosing [facility]!    DR. ASENCIO'S CLINIC SCHEDULE  MONDAY AM - [place] TUESDAY - [place]   [address] [address] [address] [address] [address] [place] [address]   [phone] / [phone] / [phone]       WEDNESDAY - [place] FRIDAY AM - [facility]   [address]   [place] [address] [place] [address]   [phone] / [phone]       FRIDAY PM - [place] SCHEDULE SURGERY: [phone]   [address] BILLING QUESTIONS: [phone]   [place] [address] AFTER HOURS: [phone]   [phone] / [phone] APPOINTMENTS: [phone]     Consumer Price Line (CPL) [phone]         BODY WEIGHT AND YOUR FEET  The following information is included in the after visit summary for all patients. Body weight can be a sensitive issue to discuss in clinic, but we think the following information is very important. Although we focus on the feet and ankles, we do support the overall health of our patients.     Many things can cause foot and ankle problems. Foot structure, activity level, foot mechanics and injuries are common causes of pain. One very important issue that often goes unmentioned, is body weight. Extra weight can cause increased stress on muscles, ligaments, bones and tendons. Sometimes just a few extra pounds is all it takes to put one over her/his threshold. Without reducing that stress, it can be difficult to alleviate pain. As Foot & Ankle specialists, our job is addressing the lower extremity problem and possible causes. Regarding extra body weight, we encourage patients to discuss diet and weight management plans with their primary care doctors. It is this team approach that gives you the best opportunity for pain relief and getting you back on your feet.      [facility] has a Comprehensive Weight Management Program. This program includes counseling,  education, non-surgical and surgical approaches to weight loss. If you are interested in learning more either talk to you primary care provider or call [phone].

## 2019-07-24 NOTE — PROGRESS NOTES
"Podiatry / Foot and Ankle Surgery Progress Note    July 24, 2019    Subject: Patient was seen for follow up on left foot ulcer. Notes it hasn't changed much. Denies fever, chills, nausa. Wondering why it isn't going away.     Objective:  Vitals: /62   Ht 1.93 m (6' 4\")   Wt 110.7 kg (244 lb)   BMI 29.70 kg/m    BMI= Body mass index is 29.7 kg/m .     A1C: 9.0 (5/2019)     General:  Patient is alert and orientated.  NAD     Vascular:  DP and PT pulses are palpable.  No varicosities noted  CFT's < 3secs.  Skin temp is normal     Neuro:  Light and gross touch sensation absent to feet.      Derm:  New full thickenss ulceratoni to the plantar left 1st metatarsal head. Measures 1.0cm x 1.0cm x 0.2cm after debridement. No redness, purulent drainage or signs of infection noted.      Musculoskeletal: multiple previous toe amputations right foot.       Assessment:    Diabetic polyneuropathy associated with type 2 diabetes mellitus (H)  Ulcer of left foot, with fat layer exposed (H)  Hammer toes of both feet  Pre-ulcerative calluses  H/O amputation of lesser toe, right (H)    Plan:  Wound debrided. He is using silvadene cream. Will have him continue. He has not gotten his diabetic shoes and inserts yet as he can not drive. reput in orders.  Again discussed that his high blood sugars and continued pressure are going to inhibit healing of the wounds. Hopefully will get them today. Follow up in 4 weeks. Was told to call if he develops fever, chills, or go to ER.      Procedure: After verbal consent, excisional debridement was performed on ulcer.  #15 blade was used to debride ulcer down to and including subcutaneous tissue. Bleeding controlled with light pressure.   No drainage noted.  No anesthesia was used due to neuropathy. Dry dressing applied to foot.  Patient tolerated procedure well.    Татьяна Mckeon DPM, Podiatry/Foot and Ankle Surgery    Weight management plan: Patient was referred to their PCP to discuss a " diet and exercise plan.

## 2019-07-24 NOTE — PROGRESS NOTES
HPI and Plan:   This 69-year-old gentleman is seen after recent hospitalization with acute coronary syndrome.  This resulted in right coronary mid and distal stenting.    He has a long cardiac history including anterior MI in 2005 with proximal LAD stenting.  Repeat LAD stenting in 2009 and distal LAD stenting in 2013.  He presented with angina and decreased ejection fraction 2017 and had stenting of a large marginal branch, with widely patent LAD stents at that time.  There was only mild to moderate disease in the right coronary.  He then presented this admission several weeks ago with 3 weeks or more of the worsening dyspnea on exertion and exertional marked weakness and arm pain.  Troponins were negative but he proceeded angiography, which I personally reviewed, and which demonstrated total occlusion of the mid right with diffuse severe distal right disease.  There were left-to-right collaterals however.  This was successfully recanalized with stents placed throughout the mid and distal RCA.  LAD and circumflex stents were widely patent.  The RCA is at most a modest sized territory with a modest PDA and small posterior lateral vessels territories.  Echocardiography showed no change in his underlying ejection fraction which is been 35 to 40%.  He did not have heart failure exacerbation.  He had a severe heart failure exacerbation at the end of this year but this is associated with severe acute renal insufficiency and likely most of the volume overload was from that.  With change in his antibiotics and diuresis he has volume overload and renal insufficiency I personally resolved.  Current creatinine is back down to his baseline of around 1.0.  For the last 5 months his weight has fluctuated right around 244 pounds which is where he is at currently.  With this he is not having dyspnea on exertion, orthopnea, edema.  He denies orthostasis, syncope or near syncope.  He has chronic atrial fibrillation which is found  after he presented with an acute CVI.  He is on apixaban.  He was on triple therapy for about a week, is now on apixaban and clopidogrel.  No current bleeding issues.  He notes he has some dental work and extractions and wants to know if he can hold his Plavix and apixaban.  I emphatically said he cannot stop his Plavix for at least 3 months and preferably 6 months.  He will discuss this further with oral surgery.  He is in cardiac rehab but he has to have a  and is difficult for his  to get him to cardiac rehab.  He has been through this in the past and is tolerating it well.  He has a gym membership and can get to that more easily so I indicated that if he is asymptomatic at cardiac rehab he could continue doing that on his own at home and at the gym.  He knows all the educational issues that cardiac rehab generally provides.     Exam today is detailed below.  In summary:  Blood pressure well controlled at 110/70.  Pulse around 70 and irregular.  Weight stable.   Lungs-clear normal respiratory effort  Cardiac-no JVD or HJR.  No heave or significant murmur.    Extremities-no right lower extremity edema.  Trace left edema.    Impression/plan    1.-Recent acute coronary syndrome.  No evidence of significant infarction with no change in ejection fraction.  Stable post stenting of culprit lesion in the mid and distal RCA.  There is been significant progression of that disease apparently since 2017, which was his last angiogram that showed only mild to moderate RCA disease.  However he is on intensive risk factor intervention with controlled blood pressure, intense statin dose with LDL less than 50, chronic anticoagulation and previously chronic antiplatelet therapy although that was discontinued at the beginning of this year.    2-status post coronary stenting.  Tolerating double therapy with apixaban and clopidogrel.  If he absolutely needs oral surgery and it cannot be done on clopidogrel or needs to be  done before 3 to 6 months I would consider bridging with some Lovenox.  He will discuss further with the oral surgeon.    3-ischemic cardiomyopathy.  Stable.  Current without evidence of volume overload.  Has not really had heart failure in the absence of severe renal insufficiency acutely.  Currently no significant renal insufficiency.  On beta-blocker and vasodilator therapy and mineralocorticoid antagonist.  Blood pressure controlled..      I will continue his current regimen.  He has difficulty getting to clinic because he has to have a  last to be off of work for that.  As long as he has no recurrent symptoms and weight stays stable and no heart failure symptoms we will have him reassessed in 4 to 6 months.  He understands the importance of contacting us should any of these symptoms recur or develop.    Orders Placed This Encounter   Procedures     Follow-Up with [facility] visit       No orders of the defined types were placed in this encounter.      There are no discontinued medications.      Encounter Diagnoses   Name Primary?     Chronic systolic congestive heart failure (H) Yes     S/P coronary artery stent placement      Acute coronary syndrome (H)        CURRENT MEDICATIONS:  Current Outpatient Medications   Medication Sig Dispense Refill     apixaban ANTICOAGULANT (ELIQUIS) 5 MG tablet Take 1 tablet (5 mg) by mouth 2 times daily       atorvastatin (LIPITOR) 40 MG tablet Take 1 tablet (40 mg) by mouth every evening 90 tablet 3     carvedilol (COREG) 25 MG tablet Take 1 tablet (25 mg) by mouth 2 times daily (with meals) 180 tablet 3     Cholecalciferol (VITAMIN D3) 2000 units TABS Take 1 tablet by mouth daily       clopidogrel (PLAVIX) 75 MG tablet Take 1 tablet (75 mg) by mouth daily 30 tablet 0     furosemide (LASIX) 20 MG tablet Take 0.5 tablets (10 mg) by mouth 2 times daily (Patient taking differently: Take 20 mg by mouth daily ) 30 tablet 0     insulin aspart (NOVOLOG FLEXPEN) 100  UNIT/ML pen Inject 8 Units Subcutaneous daily (with lunch)       insulin aspart (NOVOLOG FLEXPEN) 100 UNIT/ML pen Inject 10 Units Subcutaneous daily (with dinner)       insulin aspart (NOVOLOG FLEXPEN) 100 UNIT/ML pen Inject Subcutaneous 3 times daily (with meals) Sliding Scale  Do not give sliding scale insulin if Pre-Meal BG less than 140.   For Pre-Meal:   - 200 give 2 units.    - 250 give 4 units.    - 300 give 6 units.    - 350 give 8 units.    - 400 give 10 units.       insulin aspart (NOVOLOG PEN) 100 UNIT/ML pen Inject 8 Units Subcutaneous daily (with breakfast)        insulin glargine (LANTUS SOLOSTAR PEN) 100 UNIT/ML pen Inject 36 Units Subcutaneous every morning       insulin pen needle 31G X 6 MM Use  as directed. 100 each prn     isosorbide mononitrate (IMDUR) 60 MG 24 hr tablet Take 1 tablet (60 mg) by mouth daily 30 tablet 0     levothyroxine (SYNTHROID, LEVOTHROID) 137 MCG tablet Take 137 mcg by mouth daily  90 tablet 3     lisinopril (PRINIVIL/ZESTRIL) 40 MG tablet Take 1 tablet (40 mg) by mouth daily 30 tablet 0     nitroglycerin (NITROSTAT) 0.4 MG sublingual tablet Place 1 tablet (0.4 mg) under the tongue every 5 minutes as needed for chest pain 50 tablet 0     NONFORMULARY Topical patch for diabetes (blood glucose control) - changes every 4 days or so       order for DME Equipment being ordered: Other: surgical shoe male XL  Treatment Diagnosis: sp right 2nd toe amputaion 1 Device 0     order for DME Equipment being ordered: Walker Wheels () and Walker ()  Treatment Diagnosis: Impaired gait, heel WB bilaterally. 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     Cardiomyopathy (H)      Cellulitis of left lower extremity 3/13/2018     Cerebral infarction (H)     eye sight decreased in peripheral of right side and blurry     Diabetic ulcer of left midfoot associated with type 2 diabetes mellitus, with fat layer exposed (H) 3/13/2018     Essential hypertension, benign 11/13/2002     Generalized osteoarthrosis, unspecified site 11/13/2002     Microalbuminuria 3/13/2018    X1     Myocardial infarction (H)      Neuropathy      Sepsis (H)      Sleep apnea     doesn't use it     Substance abuse (H)      Syncope, unspecified syncope type 3/13/2018     Thyroid disease     takes medicine     Tobacco use disorder 11/13/2002     Type 2 diabetes mellitus with diabetic peripheral angiopathy without gangrene, unspecified long term insulin use status 2005       PAST SURGICAL HISTORY:  Past Surgical History:   Procedure Laterality Date     AMPUTATE TOE(S) Right 1/6/2019    Procedure: Right open second toe partial amputation;  Surgeon: Sabino Garcia DPM;  Location: [facility] OR     AMPUTATE TOE(S) Right 1/8/2019    Procedure: 1.  Revision right second toe amputation with resection of distal half of proximal phalanx with full closure.    2.  Debridement of callus/preulcerative lesion, distal left second toe and plantar left first metatarsophalangeal joint.;  Surgeon: Ryan Bhagat DPM;  Location: [facility] OR     AMPUTATE TOE(S) Right 3/12/2019    Procedure: Partial right fourth toe amputation.;  Surgeon: Ryan Bhagat DPM;  Location: [facility] OR     AMPUTATE TOE(S) Right 5/6/2019    Procedure: Right partial third toe amputation;  Surgeon: Татьяна Mckeon DPM, Podiatry/Foot and Ankle Surgery;  Location: [facility] OR     ANGIOGRAM  6/29/05    subtotal occ.mid LAD,SUSAN mid LAD     ANGIOGRAM  7/05    mild CAD,patent stent,no  flow-limiting lesions,sev.LV dysf.LAD enlarged     ANGIOGRAM  2/09    Sev.single vessel disease,Mod LV dysf.distal LAD 90%,70-75% mid lad just before prev stent,SUSAN to prox.mid LAD, endeavor to distal LAD     ANGIOGRAM  11/13/13    restenosis, stent LAD     BACK SURGERY      back surgery x3     C NONSPECIFIC PROCEDURE      Laminectomy x 3 - (1983 x 2 & 1990)     C NONSPECIFIC PROCEDURE Bilateral 1998    Bunionectomy     C NONSPECIFIC PROCEDURE  1959    Gingival surgery at age 9     CV CORONARY ANGIOGRAM N/A 7/8/2019    Procedure: Coronary Angiogram;  Surgeon: Jose Alfredo Crockett MD;  Location:  [facility]     CV LEFT HEART CATH N/A 7/8/2019    Procedure: Left Heart Cath;  Surgeon: Jose Alfredo Crockett MD;  Location:  [facility]     CV PCI ASPIRATION THROMECTOMY N/A 7/8/2019    Procedure: PCI Aspiration Thrombectomy;  Surgeon: Jose Alfredo Crockett MD;  Location:  [facility]     CV PCI STENT DRUG ELUTING N/A 7/8/2019    Procedure: PCI Stent Drug Eluting;  Surgeon: Jose Alfredo Crockett MD;  Location:  [facility]ATH [facility]     HEART CATH LEFT HEART CATH  06/13/2017    SUSAN to OM3     INCISION AND DRAINAGE TOE, COMBINED Bilateral 9/11/2018    Procedure: COMBINED INCISION AND DRAINAGE TOE;  1) Irrigation and debridement ulcer left great toe  2) Amputation 3rd toe right foot at distal interphalangeal joint level;  Surgeon: Miki Harp MD;  Location:  OR     IRRIGATION AND DEBRIDEMENT FOOT, COMBINED Left 3/12/2019    Procedure: Debridement of left foot ulcer sub first MPJ 2 x 2.5 cm down to and including subcutaneous tissue, callus debridement for preulcerative lesion, left second toe.;  Surgeon: Ryan Bhagat DPM;  Location:  OR     ORTHOPEDIC SURGERY      bunion surgery both feet     ORTHOPEDIC SURGERY      left shoulder     SOFT TISSUE SURGERY      debridement of toe numerous time     VASCULAR SURGERY      7 stents in heart       FAMILY HISTORY:  Family History    Problem Relation Age of Onset     Cancer - colorectal Sister      Thyroid Disease Brother      Cardiovascular Brother      Diabetes Brother      Cancer Father         tumor in chest and throat     Arthritis Mother      Thyroid Disease Mother      Diabetes Mother      Glaucoma No family hx of      Macular Degeneration No family hx of        SOCIAL HISTORY:  Social History     Socioeconomic History     Marital status:      Spouse name: None     Number of children: None     Years of education: None     Highest education level: None   Occupational History     None   Social Needs     Financial resource strain: None     Food insecurity:     Worry: None     Inability: None     Transportation needs:     Medical: None     Non-medical: None   Tobacco Use     Smoking status: Former Smoker     Packs/day: 1.00     Years: 25.00     Pack years: 25.00     Types: Cigarettes     Last attempt to quit: 2005     Years since quittin.0     Smokeless tobacco: Never Used   Substance and Sexual Activity     Alcohol use: No     Alcohol/week: 2.4 oz     Types: 4 Shots of liquor per week     Frequency: Never     Drug use: No     Sexual activity: Yes     Partners: Female   Lifestyle     Physical activity:     Days per week: None     Minutes per session: None     Stress: None   Relationships     Social connections:     Talks on phone: None     Gets together: None     Attends [employer] witness service: None     Active member of club or organization: None     Attends meetings of clubs or organizations: None     Relationship status: None     Intimate partner violence:     Fear of current or ex partner: None     Emotionally abused: None     Physically abused: None     Forced sexual activity: None   Other Topics Concern     Parent/sibling w/ CABG, MI or angioplasty before 65F 55M? Yes      Service Not Asked     Blood Transfusions Not Asked     Caffeine Concern No     Comment: 3 cups daily     Occupational Exposure Not Asked      "Hobby Hazards Not Asked     Sleep Concern Not Asked     Stress Concern Not Asked     Weight Concern Not Asked     Special Diet Yes     Comment: watching carb intake     Back Care Not Asked     Exercise No     Comment: some walking     Bike Helmet Not Asked     Seat Belt Not Asked     Self-Exams Not Asked   Social History Narrative     None       Review of Systems:  Skin:  Negative       Eyes:  Positive for glasses;visual blurring loss of vision on the right side  ENT:  Positive for   runny nose  Respiratory:  Positive for sleep apnea does not use machine   Cardiovascular:    Positive for;palpitations    Gastroenterology: Negative      Genitourinary:  Positive for nocturia    Musculoskeletal:  Positive for arthritis;foot pain toe amputation, restless legs - sleeps poorly bc of this; shoulder pain  Neurologic:  Positive for headaches;numbness or tingling of feet;numbness or tingling of hands off balance  Psychiatric:  Negative      Heme/Lymph/Imm:  Positive for easy bruising    Endocrine:  Positive for diabetes;thyroid disorder      Physical Exam:  Vitals: /70 (BP Location: Right arm, Patient Position: Chair, Cuff Size: Adult Large)   Pulse 72   Ht 1.93 m (6' 4\")   Wt 110.6 kg (243 lb 14.4 oz)   BMI 29.69 kg/m      Constitutional:  cooperative, alert and oriented, well developed, well nourished, in no acute distress        Skin:  warm and dry to the touch          Head:  normocephalic        Eyes:  pupils equal and round;sclera white;EOMS intact        Lymph:      ENT:  no pallor or cyanosis        Neck:  JVP normal(no HJR)        Respiratory:  clear to auscultation;normal symmetry;normal respiratory excursion         Cardiac: no murmurs, gallops or rubs detected;normal S1 and S2 irregularly irregular rhythm           mildly irregular  not assessed this visit                                        GI:  abdomen soft;non-tender;no bruits obese      Extremities and Muscular Skeletal:          LLE edema;trace  "     Neurological:  no gross motor deficits        Psych:  affect appropriate, oriented to time, person and place        CC  No referring provider defined for this encounter.

## 2019-07-24 NOTE — LETTER
"    7/24/2019         RE: Jayro Elder  [address] [place] [address]        Dear Colleague,    [facility], [facility] Elder[facility] Please see a copy of my visit note below.    Podiatry / Foot and Ankle Surgery Progress Note    July 24, 2019    Subject: Patient was seen for follow up on left foot ulcer. Notes it hasn't changed much. Denies fever, chills, nausa. Wondering why it isn't going away.     Objective:  Vitals: /62   Ht 1.93 m (6' 4\")   Wt 110.7 kg (244 lb)   BMI 29.70 kg/m     BMI= Body mass index is 29.7 kg/m .     A1C: 9.0 (5/2019)     General:  Patient is alert and orientated.  NAD     Vascular:  DP and PT pulses are palpable.  No varicosities noted  CFT's < 3secs.  Skin temp is normal     Neuro:  Light and gross touch sensation absent to feet.      Derm:  New full thickenss ulceratoni to the plantar left 1st metatarsal head. Measures 1.0cm x 1.0cm x 0.2cm after debridement. No redness, purulent drainage or signs of infection noted.      Musculoskeletal: multiple previous toe amputations right foot.       Assessment:    Diabetic polyneuropathy associated with type 2 diabetes mellitus (H)  Ulcer of left foot, with fat layer exposed (H)  Hammer toes of both feet  Pre-ulcerative calluses  H/O amputation of lesser toe, right (H)    Plan:  Wound debrided. He is using silvadene cream. Will have him continue. He has not gotten his diabetic shoes and inserts yet as he can not drive. reput in orders.  Again discussed that his high blood sugars and continued pressure are going to inhibit healing of the wounds. Hopefully will get them today. Follow up in 4 weeks. Was told to call if he develops fever, chills, or go to ER.      Procedure: After verbal consent, excisional debridement was performed on ulcer.  #15 blade was used to debride ulcer down to and including subcutaneous tissue. Bleeding controlled with light pressure.   No " drainage noted.  No anesthesia was used due to neuropathy. Dry dressing applied to foot.  Patient tolerated procedure well.    Татьяна Mckeon DPM, Podiatry/Foot and Ankle Surgery    Weight management plan: Patient was referred to their PCP to discuss a diet and exercise plan.      Again, thank you for allowing me to participate in the care of your patient.        Sincerely,        Татьяна Mckeon DPM, Podiatry/Foot and Ankle Surgery

## 2019-08-02 DIAGNOSIS — I20.0 UNSTABLE ANGINA (H): ICD-10-CM

## 2019-08-02 RX ORDER — FUROSEMIDE 20 MG
10 TABLET ORAL 2 TIMES DAILY
Qty: 90 TABLET | Refills: 3 | Status: SHIPPED | OUTPATIENT
Start: 2019-08-02 | End: 2019-08-13

## 2019-08-02 RX ORDER — CLOPIDOGREL BISULFATE 75 MG/1
75 TABLET ORAL DAILY
Qty: 90 TABLET | Refills: 3 | Status: SHIPPED | OUTPATIENT
Start: 2019-08-02 | End: 2020-10-26

## 2019-08-05 DIAGNOSIS — I25.119 CORONARY ARTERY DISEASE INVOLVING NATIVE CORONARY ARTERY OF NATIVE HEART WITH ANGINA PECTORIS (H): ICD-10-CM

## 2019-08-05 DIAGNOSIS — I10 ESSENTIAL HYPERTENSION: ICD-10-CM

## 2019-08-05 RX ORDER — ISOSORBIDE MONONITRATE 60 MG/1
60 TABLET, EXTENDED RELEASE ORAL DAILY
Qty: 90 TABLET | Refills: 1 | Status: SHIPPED | OUTPATIENT
Start: 2019-08-05 | End: 2019-08-28

## 2019-08-05 RX ORDER — SPIRONOLACTONE 25 MG/1
25 TABLET ORAL DAILY
Qty: 90 TABLET | Refills: 1 | Status: SHIPPED | OUTPATIENT
Start: 2019-08-05 | End: 2019-11-14

## 2019-08-08 DIAGNOSIS — I50.23 ACUTE ON CHRONIC SYSTOLIC CONGESTIVE HEART FAILURE (H): ICD-10-CM

## 2019-08-08 DIAGNOSIS — I48.20 CHRONIC ATRIAL FIBRILLATION (H): ICD-10-CM

## 2019-08-08 RX ORDER — LISINOPRIL 40 MG/1
40 TABLET ORAL DAILY
Qty: 90 TABLET | Refills: 1 | Status: SHIPPED | OUTPATIENT
Start: 2019-08-08 | End: 2020-03-16

## 2019-08-08 NOTE — ADDENDUM NOTE
Encounter addended by: Audrey Engle EP on: 8/8/2019 1:07 PM   Actions taken: Sign clinical note, Episode resolved

## 2019-08-08 NOTE — PROGRESS NOTES
Cardiac Rehab Discharge Summary    Reason for therapy discharge:    Patient/family request discontinuation of services.    Progress towards therapy goal(s). See goals on Care Plan in [facility] electronic health record for goal details.  Goals not met.  Barriers to achieving goals:   discharge on same date as initial evaluation.    Therapy recommendation(s):    Continue home exercise program.    Physician cosignature/electronic signature indicates agreements with the ITP document and approval of discharge.

## 2019-08-12 ENCOUNTER — TELEPHONE (OUTPATIENT)
Dept: CARDIOLOGY | Facility: CLINIC | Age: 69
End: 2019-08-12

## 2019-08-12 DIAGNOSIS — I20.0 UNSTABLE ANGINA (H): ICD-10-CM

## 2019-08-12 NOTE — TELEPHONE ENCOUNTER
Team 1 received a fax from [facility] stating pt would like to have furosemide 40 mg tablets because of weight gain. Pt is prescribed furosemide 20 mg tablets with instructions to take 0.5 tab twice daily. Pt was last seen in clinic by Dr. Tomlin on 7/24/19. Called and spoke with pt. Pt stated about 2 weeks ago his weight was up to 250 lbs and he was also experiencing swelling in his legs. Pt stated he had been taking furosemide 20 mg daily and he did not feel like it was helping, so he increased his dose to 40 mg daily. Pt stated he had a supply of 80 mg tablets from before his recent hospitalization that he was cutting in half. Pt stated today his weight is 239.8 lbs and he no longer has any swelling. Pt stated he would like to continue taking 40 mg daily. Pt noted that he does not want to take a diuretic twice daily. Requested that pt call the clinic in the future and let us know if he has symptoms. Pt stated he has called before, although Team 1 has not received any messages from pt recently. Pt also stated he would like Dr. Tomlin to know it is difficult for him to come to the clinic for appointments as he needs to arrange a . Advised pt will review with Dr. Tomlin and call back with recommendations.

## 2019-08-13 RX ORDER — FUROSEMIDE 40 MG
40 TABLET ORAL DAILY
Qty: 90 TABLET | Refills: 3 | Status: SHIPPED | OUTPATIENT
Start: 2019-08-13 | End: 2020-11-03

## 2019-08-13 NOTE — TELEPHONE ENCOUNTER
Received response below from Dr. Tomlin:    Justin Tomlin MD Hair, Ashley W RN   Caller: Unspecified (Yesterday, 10:06 AM)             Furosemide 40 mg daily OK.      Called back and spoke with pt, communicated recommendation. New rx sent to pt's preferred pharmacy. Pt inquired about a refill for his spironolactone 25 mg daily. Advised pt it appears that our office refilled spironolactone on 8/5/19. Pt stated he was unable to pick this medication up from the pharmacy. Called and spoke with pharmacy staff, who advised that pt was not able to  the medication on 8/5/19 due to insurance as it was too early to fill. Pharmacy staff stated they will fill this med today.

## 2019-08-15 ENCOUNTER — TELEPHONE (OUTPATIENT)
Dept: FAMILY MEDICINE | Facility: CLINIC | Age: 69
End: 2019-08-15

## 2019-08-15 NOTE — TELEPHONE ENCOUNTER
Panel Management Review      Patient has the following on his problem list:     Diabetes    ASA: Passed    Last A1C  Lab Results   Component Value Date    A1C 8.8 07/06/2019    A1C 9.0 05/05/2019    A1C 11.7 01/05/2019    A1C 9.4 09/06/2018    A1C 8.8 03/04/2018     A1C tested: FAILED    Last LDL:    Lab Results   Component Value Date    CHOL 106 07/08/2019     Lab Results   Component Value Date    HDL 46 07/08/2019     Lab Results   Component Value Date    LDL 43 07/08/2019     Lab Results   Component Value Date    TRIG 84 07/08/2019     Lab Results   Component Value Date    CHOLHDLRATIO 2.3 02/02/2015     Lab Results   Component Value Date    NHDL 60 07/08/2019       Is the patient on a Statin? YES             Is the patient on Aspirin? NO    Medications     HMG CoA Reductase Inhibitors     atorvastatin (LIPITOR) 40 MG tablet             Last three blood pressure readings:  BP Readings from Last 3 Encounters:   07/24/19 110/70   07/24/19 112/62   07/15/19 136/89       Date of last diabetes office visit: 06/26/2019     Tobacco History:     History   Smoking Status     Former Smoker     Packs/day: 1.00     Years: 25.00     Types: Cigarettes     Quit date: 7/25/2005   Smokeless Tobacco     Never Used         Hypertension   Last three blood pressure readings:  BP Readings from Last 3 Encounters:   07/24/19 110/70   07/24/19 112/62   07/15/19 136/89     Blood pressure: Passed    HTN Guidelines:  Less than 140/90      Composite cancer screening  Chart review shows that this patient is due/due soon for the following None  Summary:    Patient is due/failing the following:   A1C    Action needed:   Patient needs office visit for diabetes follow up.    Type of outreach:    Phone, spoke to patient.  He states he isnt being managed within [facility] for his diabetes. No further action.    Questions for provider review:    None                                                                                                                                     Roberta Deleon CMA.       Chart routed to none.

## 2019-08-21 ENCOUNTER — OFFICE VISIT (OUTPATIENT)
Dept: PODIATRY | Facility: CLINIC | Age: 69
End: 2019-08-21
Payer: COMMERCIAL

## 2019-08-21 VITALS
DIASTOLIC BLOOD PRESSURE: 60 MMHG | WEIGHT: 243 LBS | BODY MASS INDEX: 29.59 KG/M2 | HEIGHT: 76 IN | SYSTOLIC BLOOD PRESSURE: 118 MMHG

## 2019-08-21 DIAGNOSIS — Z89.421 H/O AMPUTATION OF LESSER TOE, RIGHT (H): ICD-10-CM

## 2019-08-21 DIAGNOSIS — M20.42 HAMMERTOES OF BOTH FEET: ICD-10-CM

## 2019-08-21 DIAGNOSIS — L84 PRE-ULCERATIVE CALLUSES: ICD-10-CM

## 2019-08-21 DIAGNOSIS — E11.42 DIABETIC POLYNEUROPATHY ASSOCIATED WITH TYPE 2 DIABETES MELLITUS (H): Primary | ICD-10-CM

## 2019-08-21 DIAGNOSIS — M20.41 HAMMERTOES OF BOTH FEET: ICD-10-CM

## 2019-08-21 PROCEDURE — 99212 OFFICE O/P EST SF 10 MIN: CPT | Mod: 25 | Performed by: PODIATRIST

## 2019-08-21 PROCEDURE — 11055 PARING/CUTG B9 HYPRKER LES 1: CPT | Mod: Q7 | Performed by: PODIATRIST

## 2019-08-21 ASSESSMENT — MIFFLIN-ST. JEOR: SCORE: 1968.74

## 2019-08-21 NOTE — PROGRESS NOTES
"Podiatry / Foot and Ankle Surgery Progress Note    August 21, 2019    Subject: Patient was seen for follow up on left foot ulcer. Denies fever, chills. Has had another heart surgery since last visit.     Objective:  Vitals: Ht 1.93 m (6' 4\")   Wt 110.2 kg (243 lb)   BMI 29.58 kg/m    BMI= Body mass index is 29.58 kg/m .     A1C: 9.0 (5/2019)     General:  Patient is alert and orientated.  NAD     Vascular:  DP and PT pulses are palpable.  No varicosities noted  CFT's < 3secs.  Skin temp is normal     Neuro:  Light and gross touch sensation absent to feet.      Derm:  per ulcerative hyperkeratotic lesion to plantar left 1st metatarsal head. No open lesions upon debridement. Macerated tissue noted. No open lesions or signs of infection.       Musculoskeletal: multiple previous toe amputations right foot.       Assessment:    Diabetic polyneuropathy associated with type 2 diabetes mellitus (H)  Ulcer of left foot, with fat layer exposed (H)  Hammer toes of both feet  Pre-ulcerative calluses  H/O amputation of lesser toe, right (H)     Plan:  lesion debrided. he has been off feet in the hospital which has helped the healing of the left foot wound. Recommend alternating lotion to area and iodine if it cracks.  Follow up in 4-5 weeks as he is at high risk for reulceration. .       Procedure: After verbal consent, the hyperkeratotic lesion was debrided using a #15 blade without incident. Patient tolerated procedure well.       Татьяна Mckeon DPM, Podiatry/Foot and Ankle Surgery    Weight management plan: Patient was referred to their PCP to discuss a diet and exercise plan.    Recommended to Jayro Elder to follow up with Primary Care provider regarding elevated blood pressure.    "

## 2019-08-21 NOTE — LETTER
"    8/21/2019         RE: Jayro Elder  [address] [place] [address]        Dear Colleague,    Thank you for referring your patient, Jayro Elder, to the [facility]. Please see a copy of my visit note below.    Podiatry / Foot and Ankle Surgery Progress Note    August 21, 2019    Subject: Patient was seen for follow up on left foot ulcer. Denies fever, chills. Has had another heart surgery since last visit.     Objective:  Vitals: Ht 1.93 m (6' 4\")   Wt 110.2 kg (243 lb)   BMI 29.58 kg/m     BMI= Body mass index is 29.58 kg/m .     A1C: 9.0 (5/2019)     General:  Patient is alert and orientated.  NAD     Vascular:  DP and PT pulses are palpable.  No varicosities noted  CFT's < 3secs.  Skin temp is normal     Neuro:  Light and gross touch sensation absent to feet.      Derm:  per ulcerative hyperkeratotic lesion to plantar left 1st metatarsal head. No open lesions upon debridement. Macerated tissue noted. No open lesions or signs of infection.       Musculoskeletal: multiple previous toe amputations right foot.       Assessment:    Diabetic polyneuropathy associated with type 2 diabetes mellitus (H)  Ulcer of left foot, with fat layer exposed (H)  Hammer toes of both feet  Pre-ulcerative calluses  H/O amputation of lesser toe, right (H)     Plan:  lesion debrided.  he has been off feet in the hospital which has helped the healing of the left foot wound. Recommend alternating lotion to area and iodine if it cracks.  Follow up in 4-5 weeks as he is at high risk for reulceration. .       Procedure: After verbal consent, the hyperkeratotic lesion was debrided using a #15 blade without incident. Patient tolerated procedure well.       Татьяна Mckeon DPM, Podiatry/Foot and Ankle Surgery    Weight management plan: Patient was referred to their PCP to discuss a diet and exercise plan.    Recommended to Jayro Elder to follow up with Primary Care provider regarding elevated " blood pressure.      Again, thank you for allowing me to participate in the care of your patient.        Sincerely,        Татьяна Mckeon DPM, Podiatry/Foot and Ankle Surgery

## 2019-08-21 NOTE — PATIENT INSTRUCTIONS
Thank you for choosing [facility]!    DR. ASENCIO'S CLINIC SCHEDULE  MONDAY AM - [place] TUESDAY - [place]   [address] [address] [place] [address] [place] [address]   [phone] / [phone] / [phone]       WEDNESDAY - [place] FRIDAY AM - WOUND CENTER   [address]   [place] [address] [place] [address]   [phone] / [phone]       FRIDAY PM - [place] SCHEDULE SURGERY: [phone]   [address] [facility] [address] BILLING QUESTIONS: [phone]   [place] [address] AFTER HOURS: [phone]   [phone] / [phone] APPOINTMENTS: [phone]     Consumer Price Line (CPL) [phone]     4-5 week follow up      BODY WEIGHT AND YOUR FEET  The following information is included in the after visit summary for all patients. Body weight can be a sensitive issue to discuss in clinic, but we think the following information is very important. Although we focus on the feet and ankles, we do support the overall health of our patients.     Many things can cause foot and ankle problems. Foot structure, activity level, foot mechanics and injuries are common causes of pain. One very important issue that often goes unmentioned, is body weight. Extra weight can cause increased stress on muscles, ligaments, bones and tendons. Sometimes just a few extra pounds is all it takes to put one over her/his threshold. Without reducing that stress, it can be difficult to alleviate pain. As Foot & Ankle specialists, our job is addressing the lower extremity problem and possible causes. Regarding extra body weight, we encourage patients to discuss diet and weight management plans with their primary care doctors. It is this team approach that gives you the best opportunity for pain relief and getting you back on your feet.      [facility] has a Comprehensive Weight Management Program. This program includes  counseling, education, non-surgical and surgical approaches to weight loss. If you are interested in learning more either talk to you primary care provider or call [phone].

## 2019-08-28 DIAGNOSIS — I25.119 CORONARY ARTERY DISEASE INVOLVING NATIVE CORONARY ARTERY OF NATIVE HEART WITH ANGINA PECTORIS (H): ICD-10-CM

## 2019-08-28 RX ORDER — ISOSORBIDE MONONITRATE 60 MG/1
60 TABLET, EXTENDED RELEASE ORAL DAILY
Qty: 90 TABLET | Refills: 1 | Status: SHIPPED | OUTPATIENT
Start: 2019-08-28 | End: 2020-03-16

## 2019-09-11 NOTE — PROGRESS NOTES
Cardiac Rehab Discharge Summary    Reason for therapy discharge:    Did not return after 1st visit    Progress towards therapy goal(s). See goals on Care Plan in [facility] electronic health record for goal details.  Goals not met.  Barriers to achieving goals:   discharge on same date as initial evaluation and no reason stated.    Therapy recommendation(s):    Continue home exercise program.

## 2019-09-18 ENCOUNTER — OFFICE VISIT (OUTPATIENT)
Dept: PODIATRY | Facility: CLINIC | Age: 69
End: 2019-09-18
Payer: COMMERCIAL

## 2019-09-18 VITALS
SYSTOLIC BLOOD PRESSURE: 120 MMHG | HEIGHT: 76 IN | BODY MASS INDEX: 29.59 KG/M2 | WEIGHT: 243 LBS | DIASTOLIC BLOOD PRESSURE: 70 MMHG

## 2019-09-18 DIAGNOSIS — M20.41 HAMMER TOES OF BOTH FEET: ICD-10-CM

## 2019-09-18 DIAGNOSIS — L84 PRE-ULCERATIVE CALLUSES: ICD-10-CM

## 2019-09-18 DIAGNOSIS — E11.42 DIABETIC POLYNEUROPATHY ASSOCIATED WITH TYPE 2 DIABETES MELLITUS (H): Primary | ICD-10-CM

## 2019-09-18 DIAGNOSIS — Z89.421 H/O AMPUTATION OF LESSER TOE, RIGHT (H): ICD-10-CM

## 2019-09-18 DIAGNOSIS — M20.42 HAMMER TOES OF BOTH FEET: ICD-10-CM

## 2019-09-18 PROCEDURE — 99212 OFFICE O/P EST SF 10 MIN: CPT | Mod: 25 | Performed by: PODIATRIST

## 2019-09-18 PROCEDURE — 11056 PARNG/CUTG B9 HYPRKR LES 2-4: CPT | Mod: Q7 | Performed by: PODIATRIST

## 2019-09-18 ASSESSMENT — MIFFLIN-ST. JEOR: SCORE: 1968.74

## 2019-09-18 NOTE — PATIENT INSTRUCTIONS
Thank you for choosing [facility]!    DR. [facility]ENCIO[facility]S CLINIC SCHEDULE  MONDAY AM - [place] TUESDAY - [place]   [address] [address] [place] [address] [place] [address]   [phone] / [phone] / [phone]       WEDNESDAY - [place] FRIDAY AM - WOUND CENTER   [address]   [place] [address] [place] [address]   [phone] / [phone]       FRIDAY PM - [place] SCHEDULE SURGERY: [phone]   [address] [facility] [address] BILLING QUESTIONS: [phone]   [place] [address] AFTER HOURS: [phone]   [phone] / [phone] APPOINTMENTS: [phone]     Consumer Price Line (CPL) [phone]       One month follow up        BODY WEIGHT AND YOUR FEET  The following information is included in the after visit summary for all patients. Body weight can be a sensitive issue to discuss in clinic, but we think the following information is very important. Although we focus on the feet and ankles, we do support the overall health of our patients.     Many things can cause foot and ankle problems. Foot structure, activity level, foot mechanics and injuries are common causes of pain. One very important issue that often goes unmentioned, is body weight. Extra weight can cause increased stress on muscles, ligaments, bones and tendons. Sometimes just a few extra pounds is all it takes to put one over her/his threshold. Without reducing that stress, it can be difficult to alleviate pain. As Foot & Ankle specialists, our job is addressing the lower extremity problem and possible causes. Regarding extra body weight, we encourage patients to discuss diet and weight management plans with their primary care doctors. It is this team approach that gives you the best opportunity for pain relief and getting you back on your feet.      [facility] has a Comprehensive Weight Management Program. This program  includes counseling, education, non-surgical and surgical approaches to weight loss. If you are interested in learning more either talk to you primary care provider or call [phone].

## 2019-09-18 NOTE — PROGRESS NOTES
"Podiatry / Foot and Ankle Surgery Progress Note    September 18, 2019    Subject: Patient was seen for follow up on pre ulcerative calluses. He is diabetic. Gets significant callus buildup to both feet. Has had previous ulcers and toe amputations. No pain due to neuropathy.    Objective:  Vitals: /70   Ht 1.93 m (6' 4\")   Wt 110.2 kg (243 lb)   BMI 29.58 kg/m      A1C: 9.0 (5/2019)     General:  Patient is alert and orientated.  NAD     Vascular:  DP and PT pulses are palpable.  No varicosities noted  CFT's < 3secs.  Skin temp is normal     Neuro:  Light and gross touch sensation absent to feet.      Derm:  per ulcerative hyperkeratotic lesions to plantar right and left 1st metatarsal heads and distal left 2nd and 3rd toes, No open lesions upon debridement. Macerated tissue noted. No open lesions or signs of infection.       Musculoskeletal: multiple previous toe amputations right foot.       Assessment:    Diabetic polyneuropathy associated with type 2 diabetes mellitus (H)  Ulcer of left foot, with fat layer exposed (H)  Hammer toes of both feet  Pre-ulcerative calluses  H/O amputation of lesser toe, right (H)     Plan:  lesions debrided. again discussed offloading shoes and inserts. He continues to not get them.  Talked about wearing shoes around the house and not socks or barefoot.  Recommend alternating lotion to area and iodine if it cracks.  Follow up in 4-5 weeks as he is at high risk for reulceration. .       Procedure: After verbal consent, the hyperkeratotic lesions x 3 were debrided using a #15 blade without incident. Patient tolerated procedure well.     Татьяна Mckeon DPM, Podiatry/Foot and Ankle Surgery    Weight management plan: Patient was referred to their PCP to discuss a diet and exercise plan.    Recommended to Jayro Elder to follow up with Primary Care provider regarding elevated blood pressure.    "

## 2019-09-18 NOTE — LETTER
"    9/18/2019         RE: Jayro Elder  [address] [place] [address]        Dear Colleague,    Thank you for referring your patient, Jayro Elder, to the [facility]. Please see a copy of my visit note below.    Podiatry / Foot and Ankle Surgery Progress Note    September 18, 2019    Subject: Patient was seen for follow up on pre ulcerative calluses. He is diabetic. Gets significant callus buildup to both feet. Has had previous ulcers and toe amputations. No pain due to neuropathy.    Objective:  Vitals: /70   Ht 1.93 m (6' 4\")   Wt 110.2 kg (243 lb)   BMI 29.58 kg/m       A1C: 9.0 (5/2019)     General:  Patient is alert and orientated.  NAD     Vascular:  DP and PT pulses are palpable.  No varicosities noted  CFT's < 3secs.  Skin temp is normal     Neuro:  Light and gross touch sensation absent to feet.      Derm:  per ulcerative hyperkeratotic lesions to plantar right and left 1st metatarsal heads and distal left 2nd and 3rd toes, No open lesions upon debridement. Macerated tissue noted. No open lesions or signs of infection.       Musculoskeletal: multiple previous toe amputations right foot.       Assessment:    Diabetic polyneuropathy associated with type 2 diabetes mellitus (H)  Ulcer of left foot, with fat layer exposed (H)  Hammer toes of both feet  Pre-ulcerative calluses  H/O amputation of lesser toe, right (H)     Plan:  lesions debrided. again discussed offloading shoes and inserts. He continues to not get them.  Talked about wearing shoes around the house and not socks or barefoot.  Recommend alternating lotion to area and iodine if it cracks.  Follow up in 4-5 weeks as he is at high risk for reulceration. .       Procedure: After verbal consent, the hyperkeratotic lesions x 3 were debrided using a #15 blade without incident. Patient tolerated procedure well.     Татьяна Mckeon DPM, Podiatry/Foot and Ankle Surgery    Weight management plan: Patient was " referred to their PCP to discuss a diet and exercise plan.    Recommended to Jayro Elder to follow up with Primary Care provider regarding elevated blood pressure.      Again, thank you for allowing me to participate in the care of your patient.        Sincerely,        Татьяна Mckeon DPM, Podiatry/Foot and Ankle Surgery

## 2019-10-09 ENCOUNTER — HOSPITAL ENCOUNTER (EMERGENCY)
Facility: CLINIC | Age: 69
Discharge: HOME | End: 2019-10-09
Attending: EMERGENCY MEDICINE | Admitting: EMERGENCY MEDICINE
Payer: COMMERCIAL

## 2019-10-09 ENCOUNTER — APPOINTMENT (OUTPATIENT)
Dept: GENERAL RADIOLOGY | Facility: CLINIC | Age: 69
End: 2019-10-09
Attending: EMERGENCY MEDICINE
Payer: COMMERCIAL

## 2019-10-09 VITALS
OXYGEN SATURATION: 94 % | TEMPERATURE: 97.5 F | WEIGHT: 243 LBS | SYSTOLIC BLOOD PRESSURE: 114 MMHG | RESPIRATION RATE: 20 BRPM | HEART RATE: 75 BPM | BODY MASS INDEX: 29.58 KG/M2 | DIASTOLIC BLOOD PRESSURE: 71 MMHG

## 2019-10-09 DIAGNOSIS — R73.9 HYPERGLYCEMIA: ICD-10-CM

## 2019-10-09 DIAGNOSIS — R53.1 WEAKNESS: ICD-10-CM

## 2019-10-09 LAB
ANION GAP SERPL CALCULATED.3IONS-SCNC: 5 MMOL/L (ref 3–14)
BASOPHILS # BLD AUTO: 0 10E9/L (ref 0–0.2)
BASOPHILS NFR BLD AUTO: 0.2 %
BUN SERPL-MCNC: 34 MG/DL (ref 7–30)
CALCIUM SERPL-MCNC: 9.3 MG/DL (ref 8.5–10.1)
CHLORIDE SERPL-SCNC: 101 MMOL/L (ref 94–109)
CO2 BLDCOV-SCNC: 24 MMOL/L (ref 21–28)
CO2 SERPL-SCNC: 26 MMOL/L (ref 20–32)
CREAT SERPL-MCNC: 1.44 MG/DL (ref 0.66–1.25)
D DIMER PPP FEU-MCNC: 0.4 UG/ML FEU (ref 0–0.5)
DIFFERENTIAL METHOD BLD: ABNORMAL
EOSINOPHIL # BLD AUTO: 0.2 10E9/L (ref 0–0.7)
EOSINOPHIL NFR BLD AUTO: 1.2 %
ERYTHROCYTE [DISTWIDTH] IN BLOOD BY AUTOMATED COUNT: 13.8 % (ref 10–15)
GFR SERPL CREATININE-BSD FRML MDRD: 49 ML/MIN/{1.73_M2}
GLUCOSE SERPL-MCNC: 402 MG/DL (ref 70–99)
HCT VFR BLD AUTO: 40.4 % (ref 40–53)
HGB BLD-MCNC: 13 G/DL (ref 13.3–17.7)
IMM GRANULOCYTES # BLD: 0.1 10E9/L (ref 0–0.4)
IMM GRANULOCYTES NFR BLD: 0.4 %
INTERPRETATION ECG - MUSE: NORMAL
LACTATE BLD-SCNC: 1.9 MMOL/L (ref 0.7–2.1)
LYMPHOCYTES # BLD AUTO: 1 10E9/L (ref 0.8–5.3)
LYMPHOCYTES NFR BLD AUTO: 7.5 %
MCH RBC QN AUTO: 28.1 PG (ref 26.5–33)
MCHC RBC AUTO-ENTMCNC: 32.2 G/DL (ref 31.5–36.5)
MCV RBC AUTO: 87 FL (ref 78–100)
MONOCYTES # BLD AUTO: 0.6 10E9/L (ref 0–1.3)
MONOCYTES NFR BLD AUTO: 4.6 %
NEUTROPHILS # BLD AUTO: 11.1 10E9/L (ref 1.6–8.3)
NEUTROPHILS NFR BLD AUTO: 86.1 %
NRBC # BLD AUTO: 0 10*3/UL
NRBC BLD AUTO-RTO: 0 /100
PCO2 BLDV: 52 MM HG (ref 40–50)
PH BLDV: 7.28 PH (ref 7.32–7.43)
PLATELET # BLD AUTO: 255 10E9/L (ref 150–450)
PO2 BLDV: 28 MM HG (ref 25–47)
POTASSIUM SERPL-SCNC: 5.2 MMOL/L (ref 3.4–5.3)
RBC # BLD AUTO: 4.62 10E12/L (ref 4.4–5.9)
SAO2 % BLDV FROM PO2: 44 %
SODIUM SERPL-SCNC: 132 MMOL/L (ref 133–144)
TROPONIN I SERPL-MCNC: <0.015 UG/L (ref 0–0.04)
TROPONIN I SERPL-MCNC: <0.015 UG/L (ref 0–0.04)
TSH SERPL DL<=0.005 MIU/L-ACNC: 1.8 MU/L (ref 0.4–4)
WBC # BLD AUTO: 12.9 10E9/L (ref 4–11)

## 2019-10-09 PROCEDURE — 85025 COMPLETE CBC W/AUTO DIFF WBC: CPT | Performed by: EMERGENCY MEDICINE

## 2019-10-09 PROCEDURE — 85379 FIBRIN DEGRADATION QUANT: CPT | Performed by: EMERGENCY MEDICINE

## 2019-10-09 PROCEDURE — 84484 ASSAY OF TROPONIN QUANT: CPT | Performed by: EMERGENCY MEDICINE

## 2019-10-09 PROCEDURE — 82803 BLOOD GASES ANY COMBINATION: CPT

## 2019-10-09 PROCEDURE — 71046 X-RAY EXAM CHEST 2 VIEWS: CPT

## 2019-10-09 PROCEDURE — 96361 HYDRATE IV INFUSION ADD-ON: CPT

## 2019-10-09 PROCEDURE — 83605 ASSAY OF LACTIC ACID: CPT

## 2019-10-09 PROCEDURE — 84443 ASSAY THYROID STIM HORMONE: CPT | Performed by: EMERGENCY MEDICINE

## 2019-10-09 PROCEDURE — 80048 BASIC METABOLIC PNL TOTAL CA: CPT | Performed by: EMERGENCY MEDICINE

## 2019-10-09 PROCEDURE — 99285 EMERGENCY DEPT VISIT HI MDM: CPT | Mod: 25

## 2019-10-09 PROCEDURE — 25000128 H RX IP 250 OP 636: Performed by: EMERGENCY MEDICINE

## 2019-10-09 PROCEDURE — 93005 ELECTROCARDIOGRAM TRACING: CPT

## 2019-10-09 PROCEDURE — 96374 THER/PROPH/DIAG INJ IV PUSH: CPT

## 2019-10-09 RX ORDER — ONDANSETRON 2 MG/ML
4 INJECTION INTRAMUSCULAR; INTRAVENOUS ONCE
Status: COMPLETED | OUTPATIENT
Start: 2019-10-09 | End: 2019-10-09

## 2019-10-09 RX ADMIN — SODIUM CHLORIDE 500 ML: 9 INJECTION, SOLUTION INTRAVENOUS at 12:51

## 2019-10-09 RX ADMIN — ONDANSETRON HYDROCHLORIDE 4 MG: 2 INJECTION, SOLUTION INTRAMUSCULAR; INTRAVENOUS at 13:57

## 2019-10-09 ASSESSMENT — ENCOUNTER SYMPTOMS
FEVER: 0
BACK PAIN: 1
WEAKNESS: 1
DIAPHORESIS: 1
DIARRHEA: 1

## 2019-10-09 NOTE — ED PROVIDER NOTES
I received patient in signout from Dr. Rushing.  Please refer to their complete H&P for further information.  Briefly, patient with extensive cardiac history who presented with weakness and dyspnea.  Workup unremarkable, no occult infection, pending repeat trop. EKG without focal ischemia; patient with noted hyperglycemia though no DKA.  At time of signout, repeat troponin pending.     I reevaluated the patient and discussed discharge as repeat troponin negative.   The patient is comfortable and in agreement with the plan for close PCP f/u.  Return precautions given.         Diagnosis:    ICD-10-CM    1. Weakness R53.1    2. Hyperglycemia R73.9        Disposition:  discharged to home    Discharge Medications:  New Prescriptions    No medications on file       Cheryle Laboy,   10/9/2019   [facility] EMERGENCY DEPARTMENT       Cheryle Laboy,   10/09/19 1613

## 2019-10-09 NOTE — ED AVS SNAPSHOT
[facility] Emergency Department  [address]  [facility] [address]  Phone:  [phone]  Fax:  [phone]                                    Jayro Elder   MRN: [id number]    Department:  [facility] Emergency Department   Date of Visit:  10/9/2019           After Visit Summary Signature Page    I have received my discharge instructions, and my questions have been answered. I have discussed any challenges I see with this plan with the nurse or doctor.    ..........................................................................................................................................  Patient/Patient Representative Signature      ..........................................................................................................................................  Patient Representative Print Name and Relationship to Patient    ..................................................               ................................................  Date                                   Time    ..........................................................................................................................................  Reviewed by Signature/Title    ...................................................              ..............................................  Date                                               Time          22EPIC Rev 08/18

## 2019-10-09 NOTE — ED PROVIDER NOTES
History     Chief Complaint:  Shortness of Breath and Chest Pain    HPI   Jayro MAYURI Elder is a 69 year old male with a history of coronary artery disease and myocardial infarction who presents with shortness of breath and weakness. The patient reports that today he woke up and had diarrhea and diaphoresis around 0900 this morning with some back pain. Here in the ED he feels weak. He notes typically, he has chest pain when he has heart problems but notes that during his previous myocardial infarction. He denies chest pain, fevers, and leg swelling.      7/14/19 NM MPI w Lexiscan  1.  Myocardial perfusion imaging using single isotope technique  demonstrated transmural scar of the mid to distal anterior,  anteroseptal and apical region with no significant carrie-infarct  ischemia.   2. Gated images demonstrated mildly dilated left ventricle with severe  hypokinesis of the mid to distal anterior anteroseptal and apical  region.  The left ventricular systolic function is moderately reduced  ejection fraction 38%.  3. Compared to the prior study from 2018, there is no significant  change .    Allergies:  No known drug allergies    Medications:    Imdur  Lasix  Lisinopril  Aldactone  Plavix  Lantus  Eliquis  Lipitor  Coreg    Past Medical History:    Coronary artery disease  Cancer  Cardiomyopathy  Cellulitis  Cerebral infarction  Diabetic ulcer  Hypertension  Osteoarthrosis  Microalbuminuria  Myocardial infarction  Neuropathy  Sepsis  Sleep apnea  Substance abuse  Thyroid disease  Tobacco use disorder  Diabetes mellitus type 2    Past Surgical History:    Amputate toe (R) (x4)  Angiogram  Back surgery (x3)  Laminectomy (x3)  Bunionectomy  Gingival surgery  Thrombectomy  PCI stent drug eluting  Heart Cath  Incision and Drainage  Irrigation and debridement  Bunion surgery (R)  Vascular surgery    Family History:    Colorectal cancer  Thyroid disease  Cardiovascular   Diabetes  Cancer  Arthritis  Thyroid disease    Social  History:  Smoking status: Former, quit 7/25/05  Alcohol use: No  Drug use: No  PCP: Physician No Ref-Primary  Presents to the ED with wife  Marital Status:   [2]    Review of Systems   Constitutional: Positive for diaphoresis. Negative for fever.   Cardiovascular: Negative for chest pain and leg swelling.   Gastrointestinal: Positive for diarrhea.   Musculoskeletal: Positive for back pain.   Neurological: Positive for weakness.   All other systems reviewed and are negative.    Physical Exam     Patient Vitals for the past 24 hrs:   BP Temp Temp src Pulse Heart Rate Resp SpO2 Weight   10/09/19 1415 (!) 132/94 -- -- 82 101 11 100 % --   10/09/19 1345 (!) 123/93 -- -- 101 83 19 100 % --   10/09/19 1315 109/75 -- -- 92 83 9 100 % --   10/09/19 1300 106/82 -- -- 92 80 -- 100 % --   10/09/19 1245 112/79 -- -- 89 75 -- 98 % --   10/09/19 1230 104/76 -- -- 82 82 -- 98 % --   10/09/19 1215 110/73 -- -- 87 80 21 -- --   10/09/19 1204 101/72 97.5  F (36.4  C) Temporal -- 112 20 98 % 110.2 kg (243 lb)     Physical Exam  General: Alert, appears well-developed and well-nourished. Cooperative.     In mild distress  HEENT:  Head:  Atraumatic  Ears:  External ears are normal  Mouth/Throat:  Oropharynx is without erythema or exudate and mucous membranes are moist.   Eyes:   Conjunctivae normal and EOM are normal. No scleral icterus.  CV:  Normal rate, regular rhythm, normal heart sounds and radial pulses are 2+ and symmetric.  No murmur.  Resp:  Breath sounds are clear bilaterally    Non-labored, no retractions or accessory muscle use  GI:  Obese. Abdomen is soft, no distension, no tenderness. No rebound or guarding.  No CVA tenderness bilaterally  MS:  Normal range of motion. No edema.    Normal strength in all 4 extremities.     Back atraumatic.    No midline cervical, thoracic, or lumbar tenderness  Skin:  Warm and dry.  Well healed surgical scars to chest.   Neuro: Alert. Normal strength.  GCS: 15  Psych:  Normal mood and  affect.  Lymph: No anterior or posterior cervical lymphadenopathy noted.    Emergency Department Course   ECG (12:12:30):  Rate 80 bpm. DE interval *. QRS duration 138. QT/QTc 406/468. P-R-T axes * -44 11. Atrial fibrillation. Left axis deviation. Right bundle branch block. Anteroseptal infarct, age undetermined. Abnormal ECG. No significant change compared to EKG dated 7/14/19 Interpreted at 1214 by Joselito Rushing MD.    Imaging:  Radiographic findings were communicated with the patient who voiced understanding of the findings.    XR Chest 2 Views  No radiographic evidence of acute chest abnormality.   As read by Radiology.    Laboratory:  CBC: WBC 12.9 (H), HGB 13.0 (L) o/w WNL ()  BMP:  (L), Glucose 402 (H), BUN 34 (H), creatinine 1.44 (H), GFr 49 (L) o/w WNL  TSH with free T4: 1.80  ISTAT gases: pH 7.28 (L) 52 (H)/28/24/44; Lactic Acid (Resulted: 1235): 1.9   D-dimer: 0.4  Troponin I (Collected 1230): <0.015    Interventions:  1251: NS 1L IV Bolus   1357: Zofran 4 mg IV    Emergency Department Course:  Past medical records, nursing notes, and vitals reviewed.  1213: I performed an exam of the patient and obtained history, as documented above.  EKG performed, results above.  The patient was sent for a chest x-ray while in the emergency department, findings above.  IV inserted and blood drawn.    1449: I rechecked the patient. Explained findings to the patient and his wife.    I rechecked the patient.  Findings and plan explained to the Patient. Patient discharged home with instructions regarding supportive care, medications, and reasons to return. The importance of close follow-up was reviewed.     Impression & Plan    Medical Decision Making:  Patient is a 69-year-old male with a complex past medical history pertinent for severe CAD status post multiple stent placements, type 2 diabetes, hypertension, CHF, chronic atrial fibrillation on chronic anticoagulation CVA who presents with generalized  weakness as well as diaphoresis and shortness of breath.  Patient notes symptoms started early this morning.  He has had no chest pain and his EKG shows no concerning ischemic changes in comparison with prior EKGs.  Patient's initial troponin is undetectable and a repeat troponin greater than 2 hours after the first remains undetectable.  In the setting of no active chest pain, no acute ischemic changes on EKG tracings, otherwise low concern for ACS today.  Unclear to the exact etiology of his generalized weakness.  He does have a BMP with mild acute kidney injury with a creatinine of 1.44.  He is also mildly hyponatremic at 132.  He received 500 cc of IV fluids here and I suspect this is likely improved gentle fluid administration.  He is quite hyperglycemic with a glucose of 402.  He does take multiple medications for his type 2 diabetes and we would recommend close outpatient follow-up with his endocrinologist and primary care provider to ensure that his medication management of his diabetes is appropriate.  Patient with nonspecific leukocytosis of 12.9, but reassuringly no signs of fever or focal infectious symptoms.  Chest x-ray showed no evidence of pneumonia.  Also no other acute abnormalities were noted on chest x-ray. Patient feeling mildly improved and comfortable with the plan for discharge home.  He will follow-up with his endocrinologist and his primary care provider regarding his hyperglycemia and with his primary care provider in regards to the weakness for which he was seen in the emergency department here today.  Return precautions given for development of fever, worsening symptoms, or any new symptom onset.  After all questions answered return process to patient discharged home.    Diagnosis:    ICD-10-CM    1. Weakness R53.1 Troponin I (now)   2. Hyperglycemia R73.9      Disposition:  discharged to home    Discharge Medications:  Discharge Medication List as of 10/9/2019  4:28 PM          He  Jennifer  10/9/2019   [facility] EMERGENCY DEPARTMENT  I, He Rodriguez, am serving as a scribe at 12:13 PM on 10/9/2019 to document services personally performed by Joselito Rushing MD based on my observations and the provider's statements to me.      Joselito Rushing MD  10/09/19 1735

## 2019-10-09 NOTE — DISCHARGE INSTRUCTIONS
Discharge Instructions  Hyperglycemia, High Blood Sugar    Today we found your blood sugar (glucose) was high. This may mean that you have developed diabetes, or if you already know that you have diabetes, it may mean that your diabetes is not as well controlled as it should be. Sometimes blood sugar can be high temporarily and it is not diabetes. Signs of elevated blood sugar include increased thirst, frequent urination (peeing), blurred vision, fatigue, unexplained weight loss, poor wound healing, and frequent infections.    We sometimes give medicine in the Emergency Department to lower the blood sugar. We may also prescribe medicine for you to use at home, or increase the medicine that you already take. While we do not like to see your blood sugar high, it is much more dangerous to let your blood sugar get too low, so it is reasonable to take time to bring it down, or to wait and watch to see if it comes down on its own.    Generally, every Emergency Department visit should have a follow-up clinic visit with either a primary or a specialty clinic/provider. Please follow-up as instructed by your emergency provider today.     Return to the Emergency Department if you develop:  Vomiting (throwing up).  Confusion, disorientation, or being unable to wake up.  Severe weakness or illness.  Abdominal (belly) pain.    What can I do to help myself?  Check your blood sugar as instructed by your provider.  Take medications prescribed by your provider.  Follow a diabetic diet (low fat, low concentrated sweets, high fiber).  Exercise regularly.  Moderate or eliminate alcohol use.  Stop smoking.    Diabetes: Diabetes mellitus is a disease in which the body cannot regulate the amount of sugar (glucose) in the blood. Insulin allows glucose to move out of the blood into cells throughout the body where it is used for fuel. People with diabetes do not produce enough insulin (type 1 diabetes), or cannot use insulin properly (type 2  diabetes), or both. This starves the cells that need the glucose for fuel, and also harms certain organs and tissues exposed to the high glucose levels.  Over a long period of time, uncontrolled diabetes can lead to heart and blood vessel disease, blindness, kidney failure, foot ulcers and many other problems.          About 17 million Americans (6.2% of adults) have diabetes. We think that about one third of adults with diabetes do not know they have diabetes.  The incidence of diabetes is increasing rapidly. This increase is due to many factors, but the most significant are our increasing weight and decreased activity levels.     Diabetes can be a very serious and life-threatening illness if not treated.  If you were given a prescription for medicine here today, be sure to read all of the information (including the package insert) that comes with your prescription.  This will include important information about the medicine, its side effects, and any warnings that you need to know about.  The pharmacist who fills the prescription can provide more information and answer questions you may have about the medicine.  If you have questions or concerns that the pharmacist cannot address, please call or return to the Emergency Department.   Remember that you can always come back to the Emergency Department if you are not able to see your regular provider in the amount of time listed above, if you get any new symptoms, or if there is anything that worries you.

## 2019-10-16 ENCOUNTER — OFFICE VISIT (OUTPATIENT)
Dept: PODIATRY | Facility: CLINIC | Age: 69
End: 2019-10-16
Payer: COMMERCIAL

## 2019-10-16 VITALS
BODY MASS INDEX: 29.59 KG/M2 | DIASTOLIC BLOOD PRESSURE: 60 MMHG | HEIGHT: 76 IN | WEIGHT: 243 LBS | SYSTOLIC BLOOD PRESSURE: 118 MMHG

## 2019-10-16 DIAGNOSIS — L84 PRE-ULCERATIVE CALLUSES: ICD-10-CM

## 2019-10-16 DIAGNOSIS — Z89.421 H/O AMPUTATION OF LESSER TOE, RIGHT (H): ICD-10-CM

## 2019-10-16 DIAGNOSIS — E11.42 DIABETIC POLYNEUROPATHY ASSOCIATED WITH TYPE 2 DIABETES MELLITUS (H): Primary | ICD-10-CM

## 2019-10-16 DIAGNOSIS — M20.42 HAMMER TOES OF BOTH FEET: ICD-10-CM

## 2019-10-16 DIAGNOSIS — M20.41 HAMMER TOES OF BOTH FEET: ICD-10-CM

## 2019-10-16 DIAGNOSIS — L97.522 DIABETIC ULCER OF OTHER PART OF LEFT FOOT ASSOCIATED WITH TYPE 2 DIABETES MELLITUS, WITH FAT LAYER EXPOSED (H): ICD-10-CM

## 2019-10-16 DIAGNOSIS — E11.621 DIABETIC ULCER OF OTHER PART OF LEFT FOOT ASSOCIATED WITH TYPE 2 DIABETES MELLITUS, WITH FAT LAYER EXPOSED (H): ICD-10-CM

## 2019-10-16 PROCEDURE — 11042 DBRDMT SUBQ TIS 1ST 20SQCM/<: CPT | Performed by: PODIATRIST

## 2019-10-16 PROCEDURE — 99213 OFFICE O/P EST LOW 20 MIN: CPT | Mod: 25 | Performed by: PODIATRIST

## 2019-10-16 ASSESSMENT — MIFFLIN-ST. JEOR: SCORE: 1968.74

## 2019-10-16 NOTE — PROGRESS NOTES
"Podiatry / Foot and Ankle Surgery Progress Note    October 16, 2019    Subject: Patient was seen for follow up on left foot wound. Notes it has been draining more. Denies fever, chills.     Objective:  Vitals: Ht 1.93 m (6' 4\")   Wt 110.2 kg (243 lb)   BMI 29.58 kg/m    BMI= Body mass index is 29.58 kg/m .    A1C: 8.8 (7/2019)     General:  Patient is alert and orientated.  NAD     Vascular:  DP and PT pulses are palpable.  No varicosities noted  CFT's < 3secs.  Skin temp is normal     Neuro:  Light and gross touch sensation absent to feet.      Derm:  per ulcerative hyperkeratotic lesions to plantar left 1st metatarsal heads and distal left 2nd and 3rd toes, reulceration to planar left 1st metatarsal head measures 2.5cm x 2.5cm x 0.2cm  after debridement. No purulent drainage or redness or acute signs of infection. .        Musculoskeletal: multiple previous toe amputations right foot.       Assessment:    Diabetic polyneuropathy associated with type 2 diabetes mellitus (H)  Ulcer of left foot, with fat layer exposed (H)  Hammer toes of both feet  Pre-ulcerative calluses  H/O amputation of lesser toe, right (H)     Plan: ulcer debrided. Did get offloading shoes.   Talked about wearing shoes around the house and not socks or barefoot.  recommend iodosorb gel to ulcer daily.   Follow up in 4-5 weeks as he is at high risk for reulceration. .       Procedure: After verbal consent, excisional debridement was performed on ulcer.  #15 blade was used to debride ulcer down to and including subcutaneous tissue. Bleeding controlled with light pressure.   No drainage noted.  No anesthesia was used due to neuropathy. Dry dressing applied to foot.  Patient tolerated procedure well.      Татьяна Mckeon DPM, Podiatry/Foot and Ankle Surgery    Weight management plan: Patient was referred to their PCP to discuss a diet and exercise plan.    Recommended to Jayro Elder to follow up with Primary Care provider regarding elevated " blood pressure.

## 2019-10-16 NOTE — LETTER
"    10/16/2019         RE: Jayro Elder  [address] [place] [address]        Dear Colleague,    Thank you for referring your patient, Jayro Elder, to the [facility]. Please see a copy of my visit note below.    Podiatry / Foot and Ankle Surgery Progress Note    October 16, 2019    Subject: Patient was seen for follow up on left foot wound. Notes it has been draining more. Denies fever, chills.     Objective:  Vitals: Ht 1.93 m (6' 4\")   Wt 110.2 kg (243 lb)   BMI 29.58 kg/m     BMI= Body mass index is 29.58 kg/m .    A1C: 8.8 (7/2019)     General:  Patient is alert and orientated.  NAD     Vascular:  DP and PT pulses are palpable.  No varicosities noted  CFT's < 3secs.  Skin temp is normal     Neuro:  Light and gross touch sensation absent to feet.      Derm:  per ulcerative hyperkeratotic lesions to plantar left 1st metatarsal heads and distal left 2nd and 3rd toes, reulceration to planar left 1st metatarsal head measures 2.5cm x 2.5cm x 0.2cm  after debridement. No purulent drainage or redness or acute signs of infection. .        Musculoskeletal: multiple previous toe amputations right foot.       Assessment:    Diabetic polyneuropathy associated with type 2 diabetes mellitus (H)  Ulcer of left foot, with fat layer exposed (H)  Hammer toes of both feet  Pre-ulcerative calluses  H/O amputation of lesser toe, right (H)     Plan: ulcer debrided. Did get offloading shoes.   Talked about wearing shoes around the house and not socks or barefoot.   recommend iodosorb gel to ulcer daily.   Follow up in 4-5 weeks as he is at high risk for reulceration. .       Procedure: After verbal consent, excisional debridement was performed on ulcer.  #15 blade was used to debride ulcer down to and including subcutaneous tissue. Bleeding controlled with light pressure.   No drainage noted.  No anesthesia was used due to neuropathy. Dry dressing applied to foot.  Patient tolerated " procedure well.      Татьяна Mckeon DPM, Podiatry/Foot and Ankle Surgery    Weight management plan: Patient was referred to their PCP to discuss a diet and exercise plan.    Recommended to Jayro Elder to follow up with Primary Care provider regarding elevated blood pressure.            Again, thank you for allowing me to participate in the care of your patient.        Sincerely,        Татьяна Mckeon DPM, Podiatry/Foot and Ankle Surgery

## 2019-10-16 NOTE — PATIENT INSTRUCTIONS
Thank you for choosing [facility]!    DR. ASENCIO'S CLINIC SCHEDULE  MONDAY AM - [place] TUESDAY - [place]   [address] [address] [place] [address] [place] [address]   [phone] / [phone] / [phone]       WEDNESDAY - [place] FRIDAY AM - WOUND CENTER   [address]   [place] [address] [place] [address]   [phone] / [phone]       FRIDAY PM - [place] SCHEDULE SURGERY: [phone]   [address] BILLING QUESTIONS: [phone]   [place] [address] AFTER HOURS: [phone]   [phone] / [phone] APPOINTMENTS: [phone]     Consumer Price Line (CPL) [phone]         One month follow up          BODY WEIGHT AND YOUR FEET  The following information is included in the after visit summary for all patients. Body weight can be a sensitive issue to discuss in clinic, but we think the following information is very important. Although we focus on the feet and ankles, we do support the overall health of our patients.     Many things can cause foot and ankle problems. Foot structure, activity level, foot mechanics and injuries are common causes of pain. One very important issue that often goes unmentioned, is body weight. Extra weight can cause increased stress on muscles, ligaments, bones and tendons. Sometimes just a few extra pounds is all it takes to put one over her/his threshold. Without reducing that stress, it can be difficult to alleviate pain. As Foot & Ankle specialists, our job is addressing the lower extremity problem and possible causes. Regarding extra body weight, we encourage patients to discuss diet and weight management plans with their primary care doctors. It is this team approach that gives you the best opportunity for pain relief and getting you back on your feet.      [facility] has a Comprehensive Weight Management Program. This program  includes counseling, education, non-surgical and surgical approaches to weight loss. If you are interested in learning more either talk to you primary care provider or call [phone].

## 2019-11-12 ENCOUNTER — TELEPHONE (OUTPATIENT)
Dept: CARDIOLOGY | Facility: CLINIC | Age: 69
End: 2019-11-12

## 2019-11-12 DIAGNOSIS — I50.22 CHRONIC SYSTOLIC CONGESTIVE HEART FAILURE (H): Primary | ICD-10-CM

## 2019-11-12 NOTE — TELEPHONE ENCOUNTER
No orders or appt for BMP draw on 11/14/19 CORE visit w/CONCEPCION Andrea. Lasix increased 8/12/19 per Dr. Tomlin. Patient seen in ED 10/9/19 w/worsening renal function. Orders placed per CORE standards. Provider to review and sign.    Spoke w/patient and scheduled lab appt.     Henrry Syed LPN  [facility]  [phone]

## 2019-11-13 ENCOUNTER — OFFICE VISIT (OUTPATIENT)
Dept: PODIATRY | Facility: CLINIC | Age: 69
End: 2019-11-13
Payer: COMMERCIAL

## 2019-11-13 VITALS
WEIGHT: 243 LBS | SYSTOLIC BLOOD PRESSURE: 118 MMHG | HEIGHT: 76 IN | BODY MASS INDEX: 29.59 KG/M2 | DIASTOLIC BLOOD PRESSURE: 68 MMHG

## 2019-11-13 DIAGNOSIS — Z89.421 H/O AMPUTATION OF LESSER TOE, RIGHT (H): ICD-10-CM

## 2019-11-13 DIAGNOSIS — M20.42 HAMMER TOES OF BOTH FEET: ICD-10-CM

## 2019-11-13 DIAGNOSIS — E11.42 DIABETIC POLYNEUROPATHY ASSOCIATED WITH TYPE 2 DIABETES MELLITUS (H): Primary | ICD-10-CM

## 2019-11-13 DIAGNOSIS — L84 PRE-ULCERATIVE CALLUSES: ICD-10-CM

## 2019-11-13 DIAGNOSIS — M20.41 HAMMER TOES OF BOTH FEET: ICD-10-CM

## 2019-11-13 DIAGNOSIS — L97.522 ULCER OF LEFT FOOT, WITH FAT LAYER EXPOSED (H): ICD-10-CM

## 2019-11-13 PROCEDURE — 11042 DBRDMT SUBQ TIS 1ST 20SQCM/<: CPT | Performed by: PODIATRIST

## 2019-11-13 ASSESSMENT — MIFFLIN-ST. JEOR: SCORE: 1968.74

## 2019-11-13 NOTE — LETTER
"    11/13/2019         RE: Jayro Elder  [address] [place] [address]        Dear Colleague,    Thank you for referring your patient, Jayro Elder, to the [facility]. Please see a copy of my visit note below.    Podiatry / Foot and Ankle Surgery Progress Note    November 13, 2019    Subject: Patient was seen for follow up on left foot ulcer. Notes no change. Denies fever, chills, nausa.     Objective:  Vitals: /68   Ht 1.93 m (6' 4\")   Wt 110.2 kg (243 lb)   BMI 29.58 kg/m     BMI= Body mass index is 29.58 kg/m .    A1C: 8.8 (7/2019)     General:  Patient is alert and orientated.  NAD     Vascular:  DP and PT pulses are palpable.  No varicosities noted  CFT's < 3secs.  Skin temp is normal     Neuro:  Light and gross touch sensation absent to feet.      Derm:  per ulcerative hyperkeratotic lesions to plantar left 1st metatarsal heads and distal left 2nd and 3rd toes, reulceration to planar left 1st metatarsal head measures 2.5cm x 2.5cm x 0.2cm  after debridement. No purulent drainage or redness or acute signs of infection. .        Musculoskeletal: multiple previous toe amputations right foot.       Assessment:    Diabetic polyneuropathy associated with type 2 diabetes mellitus (H)  Ulcer of left foot, with fat layer exposed (H)  Hammer toes of both feet  Pre-ulcerative calluses  H/O amputation of lesser toe, right (H)     Plan: ulcer debrided. Did get offloading shoes.   Talked about wearing shoes around the house and not socks or barefoot.  Continue iodosorb gel to ulcer daily.   Follow up in 4-5 weeks as he is at high risk for reulceration. .       Procedure: After verbal consent, excisional debridement was performed on ulcer.  #15 blade was used to debride ulcer down to and including subcutaneous tissue. Bleeding controlled with light pressure.   No drainage noted.  No anesthesia was used due to neuropathy. Dry dressing applied to foot.  Patient tolerated " procedure well.      Татьяна Mckeon DPM, Podiatry/Foot and Ankle Surgery    Weight management plan: Patient was referred to their PCP to discuss a diet and exercise plan.          Again, thank you for allowing me to participate in the care of your patient.        Sincerely,        Татьяна Mckeon DPM, Podiatry/Foot and Ankle Surgery

## 2019-11-13 NOTE — PROGRESS NOTES
"Podiatry / Foot and Ankle Surgery Progress Note    November 13, 2019    Subject: Patient was seen for follow up on left foot ulcer. Notes no change. Denies fever, chills, nausa.     Objective:  Vitals: /68   Ht 1.93 m (6' 4\")   Wt 110.2 kg (243 lb)   BMI 29.58 kg/m    BMI= Body mass index is 29.58 kg/m .    A1C: 8.8 (7/2019)     General:  Patient is alert and orientated.  NAD     Vascular:  DP and PT pulses are palpable.  No varicosities noted  CFT's < 3secs.  Skin temp is normal     Neuro:  Light and gross touch sensation absent to feet.      Derm:  per ulcerative hyperkeratotic lesions to plantar left 1st metatarsal heads and distal left 2nd and 3rd toes, reulceration to planar left 1st metatarsal head measures 2.5cm x 2.5cm x 0.2cm  after debridement. No purulent drainage or redness or acute signs of infection. .        Musculoskeletal: multiple previous toe amputations right foot.       Assessment:    Diabetic polyneuropathy associated with type 2 diabetes mellitus (H)  Ulcer of left foot, with fat layer exposed (H)  Hammer toes of both feet  Pre-ulcerative calluses  H/O amputation of lesser toe, right (H)     Plan: ulcer debrided. Did get offloading shoes.   Talked about wearing shoes around the house and not socks or barefoot.  Continue iodosorb gel to ulcer daily.   Follow up in 4-5 weeks as he is at high risk for reulceration. .       Procedure: After verbal consent, excisional debridement was performed on ulcer.  #15 blade was used to debride ulcer down to and including subcutaneous tissue. Bleeding controlled with light pressure.   No drainage noted.  No anesthesia was used due to neuropathy. Dry dressing applied to foot.  Patient tolerated procedure well.      Татьяна Mckeon DPM, Podiatry/Foot and Ankle Surgery    Weight management plan: Patient was referred to their PCP to discuss a diet and exercise plan.        "

## 2019-11-13 NOTE — PATIENT INSTRUCTIONS
Thank you for choosing [facility]!    DR. ASENCIO'S CLINIC SCHEDULE  MONDAY AM - [place] TUESDAY - [place]   [address] [address] [place] [address] [place] [address]   [phone] / [phone]       WEDNESDAY - [place] FRIDAY AM - WOUND CENTER   [address]   [place] [address] [place] [address]   [phone] / [phone]       FRIDAY PM - [place] SCHEDULE SURGERY: [phone]   [address] BILLING QUESTIONS: [phone]   [place] [address] AFTER HOURS: [phone]   [phone] / [phone] APPOINTMENTS: [phone]     Consumer Price Line (CPL) [phone]       One month follow up        BODY WEIGHT AND YOUR FEET  The following information is included in the after visit summary for all patients. Body weight can be a sensitive issue to discuss in clinic, but we think the following information is very important. Although we focus on the feet and ankles, we do support the overall health of our patients.     Many things can cause foot and ankle problems. Foot structure, activity level, foot mechanics and injuries are common causes of pain. One very important issue that often goes unmentioned, is body weight. Extra weight can cause increased stress on muscles, ligaments, bones and tendons. Sometimes just a few extra pounds is all it takes to put one over her/his threshold. Without reducing that stress, it can be difficult to alleviate pain. As Foot & Ankle specialists, our job is addressing the lower extremity problem and possible causes. Regarding extra body weight, we encourage patients to discuss diet and weight management plans with their primary care doctors. It is this team approach that gives you the best opportunity for pain relief and getting you back on your feet.      [facility] has a Comprehensive Weight Management Program. This program  includes counseling, education, non-surgical and surgical approaches to weight loss. If you are interested in learning more either talk to you primary care provider or call [phone].

## 2019-11-14 ENCOUNTER — OFFICE VISIT (OUTPATIENT)
Dept: CARDIOLOGY | Facility: CLINIC | Age: 69
End: 2019-11-14
Attending: PHYSICIAN ASSISTANT
Payer: COMMERCIAL

## 2019-11-14 VITALS
HEART RATE: 80 BPM | DIASTOLIC BLOOD PRESSURE: 74 MMHG | BODY MASS INDEX: 30.73 KG/M2 | WEIGHT: 252.36 LBS | SYSTOLIC BLOOD PRESSURE: 126 MMHG | OXYGEN SATURATION: 98 % | HEIGHT: 76 IN

## 2019-11-14 DIAGNOSIS — I50.23 ACUTE ON CHRONIC SYSTOLIC CONGESTIVE HEART FAILURE (H): ICD-10-CM

## 2019-11-14 DIAGNOSIS — I10 ESSENTIAL HYPERTENSION: ICD-10-CM

## 2019-11-14 DIAGNOSIS — I25.119 CORONARY ARTERY DISEASE INVOLVING NATIVE CORONARY ARTERY OF NATIVE HEART WITH ANGINA PECTORIS (H): ICD-10-CM

## 2019-11-14 DIAGNOSIS — I50.22 CHRONIC SYSTOLIC CONGESTIVE HEART FAILURE (H): ICD-10-CM

## 2019-11-14 LAB
ANION GAP SERPL CALCULATED.3IONS-SCNC: 5 MMOL/L (ref 3–14)
BUN SERPL-MCNC: 24 MG/DL (ref 7–30)
CALCIUM SERPL-MCNC: 8.8 MG/DL (ref 8.5–10.1)
CHLORIDE SERPL-SCNC: 107 MMOL/L (ref 94–109)
CO2 SERPL-SCNC: 25 MMOL/L (ref 20–32)
CREAT SERPL-MCNC: 1.17 MG/DL (ref 0.66–1.25)
GFR SERPL CREATININE-BSD FRML MDRD: 63 ML/MIN/{1.73_M2}
GLUCOSE SERPL-MCNC: 238 MG/DL (ref 70–99)
POTASSIUM SERPL-SCNC: 4 MMOL/L (ref 3.4–5.3)
SODIUM SERPL-SCNC: 137 MMOL/L (ref 133–144)

## 2019-11-14 PROCEDURE — 99214 OFFICE O/P EST MOD 30 MIN: CPT | Performed by: PHYSICIAN ASSISTANT

## 2019-11-14 PROCEDURE — 36415 COLL VENOUS BLD VENIPUNCTURE: CPT | Performed by: INTERNAL MEDICINE

## 2019-11-14 PROCEDURE — 80048 BASIC METABOLIC PNL TOTAL CA: CPT | Performed by: PHYSICIAN ASSISTANT

## 2019-11-14 RX ORDER — SPIRONOLACTONE 25 MG/1
25 TABLET ORAL DAILY
Qty: 90 TABLET | Refills: 3 | Status: SHIPPED | OUTPATIENT
Start: 2019-11-14 | End: 2020-03-20

## 2019-11-14 ASSESSMENT — MIFFLIN-ST. JEOR: SCORE: 2011.2

## 2019-11-14 NOTE — PATIENT INSTRUCTIONS
Call [facility] nurse for any questions or concerns Mon-Fri 8am-4pm: [phone] For concerns after hours: [phone]    Medication changes:   None today     Plan from today:   I will touch base with Dr Tomlin for his recommendations on the blood thinners and your oral surgery.     Lab results:   Results for PORTER YANCEY (MRN [id number]) as of 11/14/2019 10:25   Ref. Range 11/14/2019 09:39   Sodium Latest Ref Range: 133 - 144 mmol/L 137   Potassium Latest Ref Range: 3.4 - 5.3 mmol/L 4.0   Chloride Latest Ref Range: 94 - 109 mmol/L 107   Carbon Dioxide Latest Ref Range: 20 - 32 mmol/L 25   Urea Nitrogen Latest Ref Range: 7 - 30 mg/dL 24   Creatinine Latest Ref Range: 0.66 - 1.25 mg/dL 1.17   GFR Estimate Latest Ref Range: >60 mL/min/1.73_m2 63   GFR Estimate If Black Latest Ref Range: >60 mL/min/1.73_m2 73   Calcium Latest Ref Range: 8.5 - 10.1 mg/dL 8.8   Anion Gap Latest Ref Range: 3 - 14 mmol/L 5

## 2019-11-14 NOTE — PROGRESS NOTES
Cardiology Progress Note    Date of Service: 11/14/2019      Reason for visit: [facility] follow up, chronic systolic heart failure.    Primary cardiologist: Dr. Justin Tomlin      HPI:  Mr. Elder is a pleasant 68 year old male with a PMHx including DM2, hypothyroidism, hypertension, CVA, untreated MARISOL, and renal dysfunction. He also has chronic afib (on AC), and known coronary disease with previous MI/stenting and resultant ischemic CMO with chronic EF of 35-40%. He also struggles with peripheral wounds from his diabetes. In Jan 2019 he was admitted for osteomyelitis and his stay was complicated by ABNER with IV antibiotics and his long term clopidogrel was stopped at that time. He presented back shortly after, with weight gain and ANDREWS. He was again hospitalized and treated for a CHF exacerbation, and was diuresed ~30 pounds. Echo showed no new WMAs and his EF remained in the 35-40% range.     He was again hospitalized in March 2019 for right foot cellulitis and right toe osteomyelitis. He got IV abx and underwent partial toe amputation as well as I+D of his left foot ulcer. Fortunately, it appears that he did not have an exacerbation of his heart failure, and his atrial fibrillation remained under good control. His anticoagulation was held transiently but then resumed. Upon follow up in April he was doing well from a cardiac standpoint, though continued to have issues with renal function and blood sugar control. Unfortunately, due to cost/insurance issues, he was not following with a nephrologist and was spacing out his visits to see his endocrinologist.    In early July 2019 he was admitted again for evaluation of chest pain. He ultimately underwent SUSAN x 3 to the RCA at that time. Echo showed EF remained 35-40% without significant changes. He returned a few weeks later with atypical chest pain, and medical management was recommended as repeat stress test did not show any new ischemia. He returned to see   "Sada in the office shortly after, and he was felt to be stable from a cardiac standpoint. Shortly after, he called with some weight gain, and his Lasix was increased slightly, to 40mg once daily.     Today he's back for his routine visit. About a month ago, he was back in the ED for shortness of breath, chest pain, and weakness. His EKG showed no new ischemic changes and troponin was negative. He had been having some diarrhea after some changes to his insulin and labs showed a bump in his BUN/Scr. He was given IVF in the ED and discharged home. He tells me that overall the last few weeks, he is at his baseline. He denies worsening ANDREWS but is mostly sedentary, limited more by ongoing foot wounds for which he follows with podiatry. He struggles with pain in his feet/legs at night. He thinks the swelling in his legs is better on the Lasix 40mg daily, and this has not worsened any. Fortunately, his labs today show his renal function has normalized. He has not had any recurrence of chest pain since last month, but does have occasional \"flutters.\" This happens about once a week, though is not associated with dizziness as he is usually at rest watching TV when he notices it. BP today is under good control, and heart rate today is in the 80s.     Of note, he has multiple medical problems and does not have a primary care provider. We discussed this today and he declined as he states he doesn't believe he needs one.       ASSESSMENT/PLAN:    1.  Chronic chronic systolic heart failure, ischemic cardiomyopathy.   --Known chronic EF 35-40%, which remained unchanged per last echo July 2019 when he returned with angina, requiring repeat intervention.    --His weight is up today on our scale, but he says swelling is under good control on Lasix 40mg daily. As his renal function has normalized and he is feeling well, no changes were made today.    --Continue lisinopril 40mg daily, carvedilol 25mg BID, and spironolactone 25mg daily. "       2. Chronic atrial fibrillation.   --Appears chronically rate controlled, though occasionally symptomatic, mostly at rest. We could consider a Ziopatch, but he wishes to minimize his procedures and visits. On carvedilol as above.   --Continue Eliquis, 5mg BID. He is hoping to have oral surgery in the near future, and will need to transiently hold this. See below.     3. Coronary artery disease.   --Long cardiac hx including MI in 2005 with proximal LAD stenting, repeat LAD stenting in 2009 and distal LAD stenting in 2013. In June 2017 he had stent placement to an obtuse marginal. Most recently, in July 2019, he was found to have progression of his disease and underwent SUSAN x 3 to the RCA. He tells me he's been free of chest pain since his last ED visit.    --He is currently on both Eliquis as above, and Plavix for recent stenting. He is having dental issues which are causing a lot of bleeding. I will touch base with Dr. Tomlin, for his recommendations on his anticoagulation/antiplatelet regimen perioperatively.    --Continue atorvastatin 40mg at bedtime. Last LDL under excellent control at 43, July 2019.    --He is chronically on Imdur, which I did not change today.      Follow up plan:  Return to [facility] in ~4 months, or sooner if needed. We are trying to space his visits due to transportation issues and cost.         Orders this Visit:  Orders Placed This Encounter   Procedures     Basic metabolic panel     N terminal pro BNP outpatient     CBC with platelets     Follow-Up with Cardiac Advanced Practice Provider     Orders Placed This Encounter   Medications     spironolactone (ALDACTONE) 25 MG tablet     Sig: Take 1 tablet (25 mg) by mouth daily     Dispense:  90 tablet     Refill:  3     Medications Discontinued During This Encounter   Medication Reason     spironolactone (ALDACTONE) 25 MG tablet Reorder         CURRENT MEDICATIONS:  Current Outpatient Medications   Medication Sig Dispense Refill     apixaban  ANTICOAGULANT (ELIQUIS) 5 MG tablet Take 1 tablet (5 mg) by mouth 2 times daily       atorvastatin (LIPITOR) 40 MG tablet Take 1 tablet (40 mg) by mouth every evening 90 tablet 3     carvedilol (COREG) 25 MG tablet Take 1 tablet (25 mg) by mouth 2 times daily (with meals) 180 tablet 3     Cholecalciferol (VITAMIN D3) 2000 units TABS Take 1 tablet by mouth daily       clopidogrel (PLAVIX) 75 MG tablet Take 1 tablet (75 mg) by mouth daily 90 tablet 3     furosemide (LASIX) 40 MG tablet Take 1 tablet (40 mg) by mouth daily 90 tablet 3     insulin aspart (NOVOLOG FLEXPEN) 100 UNIT/ML pen Inject 8 Units Subcutaneous daily (with lunch)       insulin aspart (NOVOLOG FLEXPEN) 100 UNIT/ML pen Inject 10 Units Subcutaneous daily (with dinner)       insulin aspart (NOVOLOG FLEXPEN) 100 UNIT/ML pen Inject Subcutaneous 3 times daily (with meals) Sliding Scale  Do not give sliding scale insulin if Pre-Meal BG less than 140.   For Pre-Meal:   - 200 give 2 units.    - 250 give 4 units.    - 300 give 6 units.    - 350 give 8 units.    - 400 give 10 units.       insulin aspart (NOVOLOG PEN) 100 UNIT/ML pen Inject 8 Units Subcutaneous daily (with breakfast)        insulin glargine (LANTUS SOLOSTAR PEN) 100 UNIT/ML pen Inject 36 Units Subcutaneous every morning       insulin pen needle 31G X 6 MM Use  as directed. 100 each prn     isosorbide mononitrate (IMDUR) 60 MG 24 hr tablet Take 1 tablet (60 mg) by mouth daily 90 tablet 1     levothyroxine (SYNTHROID, LEVOTHROID) 137 MCG tablet Take 137 mcg by mouth daily  90 tablet 3     lisinopril (PRINIVIL/ZESTRIL) 40 MG tablet Take 1 tablet (40 mg) by mouth daily 90 tablet 1     NONFORMULARY Topical patch for diabetes (blood glucose control) - changes every 4 days or so       order for DME Equipment being ordered: Other: surgical shoe male XL  Treatment Diagnosis: sp right 2nd toe amputaion 1 Device 0     pantoprazole (PROTONIX) 40 MG enteric coated tablet Take 1  tablet (40 mg) by mouth every morning (before breakfast) 30 tablet 0     spironolactone (ALDACTONE) 25 MG tablet Take 1 tablet (25 mg) by mouth daily 90 tablet 3     nitroglycerin (NITROSTAT) 0.4 MG sublingual tablet Place 1 tablet (0.4 mg) under the tongue every 5 minutes as needed for chest pain (Patient not taking: Reported on 11/14/2019) 50 tablet 0     order for DME Equipment being ordered: Walker Wheels () and Walker ()  Treatment Diagnosis: Impaired gait, heel WB bilaterally. 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     Cardiomyopathy (H)      Cellulitis of left lower extremity 3/13/2018     Cerebral infarction (H)     eye sight decreased in peripheral of right side and blurry     Diabetic ulcer of left midfoot associated with type 2 diabetes mellitus, with fat layer exposed (H) 3/13/2018     Essential hypertension, benign 11/13/2002     Generalized osteoarthrosis, unspecified site 11/13/2002     Microalbuminuria 3/13/2018    X1     Myocardial infarction (H)      Neuropathy      Sepsis (H)      Sleep apnea     doesn't use it     Substance abuse (H)      Syncope, unspecified syncope type 3/13/2018     Thyroid disease     takes medicine     Tobacco use disorder 11/13/2002     Type 2 diabetes mellitus with diabetic peripheral angiopathy without gangrene, unspecified long term insulin use status 2005       PAST SURGICAL HISTORY:  Past Surgical History:   Procedure Laterality Date     AMPUTATE TOE(S) Right 1/6/2019    Procedure: Right open second toe partial amputation;  Surgeon: Sabion Garcia DPM;  Location: [facility]     AMPUTATE TOE(S) Right 1/8/2019    Procedure: 1.  Revision right second toe amputation  with resection of distal half of proximal phalanx with full closure.    2.  Debridement of callus/preulcerative lesion, distal left second toe and plantar left first metatarsophalangeal joint.;  Surgeon: Ryan Bhagat DPM;  Location: [facility] OR     AMPUTATE TOE(S) Right 3/12/2019    Procedure: Partial right fourth toe amputation.;  Surgeon: Ryan Bhagat DPM;  Location: [facility]     AMPUTATE TOE(S) Right 5/6/2019    Procedure: Right partial third toe amputation;  Surgeon: Татьяна Mckeon DPM, Podiatry/Foot and Ankle Surgery;  Location: [facility] OR     ANGIOGRAM  6/29/05    subtotal occ.mid LAD,SUSAN mid LAD     ANGIOGRAM  7/05    mild CAD,patent stent,no flow-limiting lesions,sev.LV dysf.LAD enlarged     ANGIOGRAM  2/09    Sev.single vessel disease,Mod LV dysf.distal LAD 90%,70-75% mid lad just before prev stent,SUSAN to prox.mid LAD, endeavor to distal LAD     ANGIOGRAM  11/13/13    restenosis, stent LAD     BACK SURGERY      back surgery x3     C NONSPECIFIC PROCEDURE      Laminectomy x 3 - (1983 x 2 & 1990)     C NONSPECIFIC PROCEDURE Bilateral 1998    Bunionectomy     C NONSPECIFIC PROCEDURE  1959    Gingival surgery at age 9     CV CORONARY ANGIOGRAM N/A 7/8/2019    Procedure: Coronary Angiogram;  Surgeon: Jose Alfredo Crockett MD;  Location:  [facility]     CV LEFT HEART CATH N/A 7/8/2019    Procedure: Left Heart Cath;  Surgeon: Jose Alfredo Crockett MD;  Location:  [facility]     CV PCI ASPIRATION THROMECTOMY N/A 7/8/2019    Procedure: PCI Aspiration Thrombectomy;  Surgeon: Jose Alfredo Crockett MD;  Location:  [facility]     CV PCI STENT DRUG ELUTING N/A 7/8/2019    Procedure: PCI Stent Drug Eluting;  Surgeon: Jose Alfredo Crockett MD;  Location:  [facility]     HEART CATH LEFT HEART CATH  06/13/2017    SUSAN to OM3     INCISION AND DRAINAGE TOE, COMBINED Bilateral 9/11/2018    Procedure: COMBINED INCISION AND DRAINAGE TOE;  1) Irrigation and debridement ulcer left great  toe  2) Amputation 3rd toe right foot at distal interphalangeal joint level;  Surgeon: Miki Harp MD;  Location: [facility]     IRRIGATION AND DEBRIDEMENT FOOT, COMBINED Left 3/12/2019    Procedure: Debridement of left foot ulcer sub first MPJ 2 x 2.5 cm down to and including subcutaneous tissue, callus debridement for preulcerative lesion, left second toe.;  Surgeon: Ryan Bhagat DPM;  Location: [facility]     ORTHOPEDIC SURGERY      bunion surgery both feet     ORTHOPEDIC SURGERY      left shoulder     SOFT TISSUE SURGERY      debridement of toe numerous time     VASCULAR SURGERY      7 stents in heart       FAMILY HISTORY:  Family History   Problem Relation Age of Onset     Cancer - colorectal Sister      Thyroid Disease Brother      Cardiovascular Brother      Diabetes Brother      Cancer Father         tumor in chest and throat     Arthritis Mother      Thyroid Disease Mother      Diabetes Mother      Glaucoma No family hx of      Macular Degeneration No family hx of        SOCIAL HISTORY:  Social History     Socioeconomic History     Marital status:      Spouse name: None     Number of children: None     Years of education: None     Highest education level: None   Occupational History     None   Social Needs     Financial resource strain: None     Food insecurity:     Worry: None     Inability: None     Transportation needs:     Medical: None     Non-medical: None   Tobacco Use     Smoking status: Former Smoker     Packs/day: 1.00     Years: 25.00     Pack years: 25.00     Types: Cigarettes     Last attempt to quit: 2005     Years since quittin.3     Smokeless tobacco: Never Used   Substance and Sexual Activity     Alcohol use: No     Alcohol/week: 4.0 standard drinks     Types: 4 Shots of liquor per week     Frequency: Never     Drug use: No     Sexual activity: Yes     Partners: Female   Lifestyle     Physical activity:     Days per week: None     Minutes per session: None      "Stress: None   Relationships     Social connections:     Talks on phone: None     Gets together: None     Attends Yarsani service: None     Active member of club or organization: None     Attends meetings of clubs or organizations: None     Relationship status: None     Intimate partner violence:     Fear of current or ex partner: None     Emotionally abused: None     Physically abused: None     Forced sexual activity: None   Other Topics Concern     Parent/sibling w/ CABG, MI or angioplasty before 65F 55M? Yes      Service Not Asked     Blood Transfusions Not Asked     Caffeine Concern No     Comment: 3 cups daily     Occupational Exposure Not Asked     Hobby Hazards Not Asked     Sleep Concern Not Asked     Stress Concern Not Asked     Weight Concern Not Asked     Special Diet Yes     Comment: watching carb intake     Back Care Not Asked     Exercise No     Comment: some walking     Bike Helmet Not Asked     Seat Belt Not Asked     Self-Exams Not Asked   Social History Narrative     None       Review of Systems:  Cardiovascular: neg chest pain, pos palpitations per HPI, neg orthopnea, worsening LE edema  Constitutional: negative for chills, sweats, fevers.   Resp: Negative for dyspnea at rest, dyspnea on exertion, cough, known chronic lung disease  HEENT: Negative for new visual changes, neg new headaches.  Gastrointestinal: negative for abdominal pain, pos diarrhea, neg nausea, vomiting  Hematologic/lymphatic: pos for current systemic anticoagulation, hx of CVA   Musculoskeletal: negative for new back pain, joint pain. Pos foot pain.  Neurological: negative for focal weakness, LOC, seizures, syncope/presyncope. Pos occasional dizziness.       Physical Exam:  Vitals: /74 (BP Location: Right arm, Patient Position: Chair, Cuff Size: Adult Regular)   Pulse 80   Ht 1.93 m (6' 4\")   Wt 114.5 kg (252 lb 5.8 oz)   SpO2 98%   BMI 30.72 kg/m     Wt Readings from Last 4 Encounters:   11/14/19 114.5 kg " (252 lb 5.8 oz)   11/13/19 110.2 kg (243 lb)   10/16/19 110.2 kg (243 lb)   10/09/19 110.2 kg (243 lb)       GEN:  In general, this is a well nourished  male in no acute distress on room air.  Patient ambulatory, unaccompanied today.  HEENT:  Pupils equal, round. Sclerae nonicteric.   NECK: Supple, no masses appreciated. Trachea midline. No JVD.   C/V:  Irregular rhythm, No murmur, rub or gallop.   RESP: Respirations are unlabored. No use of accessory muscles. Clear to auscultation bilaterally without wheezing, rales, or rhonchi.  GI: Abdomen soft, nontender, nondistended.   EXTREM:  Trace nonpitting bilateral PT edema, right ankle wrapped. No cyanosis or clubbing.  NEURO: Alert and oriented, cooperative. Gait not formally assessed. No obvious focal deficits.   SKIN: Warm and dry. No rashes or petechiae appreciated.       Recent Lab Results:        BMP RESULTS:  Lab Results   Component Value Date     11/14/2019    POTASSIUM 4.0 11/14/2019    CHLORIDE 107 11/14/2019    CO2 25 11/14/2019    ANIONGAP 5 11/14/2019     (H) 11/14/2019    BUN 24 11/14/2019    CR 1.17 11/14/2019    GFRESTIMATED 63 11/14/2019    GFRESTBLACK 73 11/14/2019    BETTIE 8.8 11/14/2019        New/Pertinent imaging results since last visit:  Echo 7/6/19     Interpretation Summary     The left ventricle is normal in size. Proximal septal thickening is noted.  Left ventricular systolic function is moderately reduced. The visual ejection  fraction is estimated at 35-40%. LVEF 36% based on biplane 2D tracing.  Diastolic function not assessed due to atrial fibrillation. There is akinesis  of the mid to apical anterior walls, the mid anteroseptal and apical septal  walls and the true LV apex. There is no thrombus seen in the left ventricle.  There is no thrombus seen in the left ventricle.  The right ventricle is normal size. Mildly decreased right ventricular  systolic function.  Trace to mild mitral and tricuspid regurgitation.  No  pericardial effusion.  In comparison to the previous report dated 01/18/2019, the findings are  Similar.      Gill Doty PA-C  [facility]  Pager [phone]

## 2019-11-14 NOTE — LETTER
11/14/2019    Physician No Ref-Primary  No address on file    RE: Jayro Ortegazahra       Dear Colleague,    I had the pleasure of seeing Jayro lEder in the [facility].      Cardiology Progress Note    Date of Service: 11/14/2019      Reason for visit: [facility] clinic follow up, chronic systolic heart failure.    Primary cardiologist: Dr. Justin Tomlin      HPI:  Mr. Elder is a pleasant 68 year old male with a PMHx including DM2, hypothyroidism, hypertension, CVA, untreated MARISOL, and renal dysfunction. He also has chronic afib (on AC), and known coronary disease with previous MI/stenting and resultant ischemic CMO with chronic EF of 35-40%. He also struggles with peripheral wounds from his diabetes. In Jan 2019 he was admitted for osteomyelitis and his stay was complicated by ABNER with IV antibiotics and his long term clopidogrel was stopped at that time. He presented back shortly after, with weight gain and ANDREWS. He was again hospitalized and treated for a CHF exacerbation, and was diuresed ~30 pounds. Echo showed no new WMAs and his EF remained in the 35-40% range.     He was again hospitalized in March 2019 for right foot cellulitis and right toe osteomyelitis. He got IV abx and underwent partial toe amputation as well as I+D of his left foot ulcer. Fortunately, it appears that he did not have an exacerbation of his heart failure, and his atrial fibrillation remained under good control. His anticoagulation was held transiently but then resumed. Upon follow up in April he was doing well from a cardiac standpoint, though continued to have issues with renal function and blood sugar control. Unfortunately, due to cost/insurance issues, he was not following with a nephrologist and was spacing out his visits to see his endocrinologist.    In early July 2019 he was admitted again for evaluation of chest pain. He ultimately underwent SUSAN x 3 to the RCA at that time. Echo showed EF remained  "35-40% without significant changes. He returned a few weeks later with atypical chest pain, and medical management was recommended as repeat stress test did not show any new ischemia. He returned to see Dr. Tomlin in the office shortly after, and he was felt to be stable from a cardiac standpoint. Shortly after, he called with some weight gain, and his Lasix was increased slightly, to 40mg once daily.     Today he's back for his routine visit. About a month ago, he was back in the ED for shortness of breath, chest pain, and weakness. His EKG showed no new ischemic changes and troponin was negative. He had been having some diarrhea after some changes to his insulin and labs showed a bump in his BUN/Scr. He was given IVF in the ED and discharged home. He tells me that overall the last few weeks, he is at his baseline. He denies worsening ANDREWS but is mostly sedentary, limited more by ongoing foot wounds for which he follows with podiatry. He struggles with pain in his feet/legs at night. He thinks the swelling in his legs is better on the Lasix 40mg daily, and this has not worsened any. Fortunately, his labs today show his renal function has normalized. He has not had any recurrence of chest pain since last month, but does have occasional \"flutters.\" This happens about once a week, though is not associated with dizziness as he is usually at rest watching TV when he notices it. BP today is under good control, and heart rate today is in the 80s.     Of note, he has multiple medical problems and does not have a primary care provider. We discussed this today and he declined as he states he doesn't believe he needs one.       ASSESSMENT/PLAN:    1.  Chronic chronic systolic heart failure, ischemic cardiomyopathy.   --Known chronic EF 35-40%, which remained unchanged per last echo July 2019 when he returned with angina, requiring repeat intervention.    --His weight is up today on our scale, but he says swelling is under good " control on Lasix 40mg daily. As his renal function has normalized and he is feeling well, no changes were made today.    --Continue lisinopril 40mg daily, carvedilol 25mg BID, and spironolactone 25mg daily.       2. Chronic atrial fibrillation.   --Appears chronically rate controlled, though occasionally symptomatic, mostly at rest. We could consider a Ziopatch, but he wishes to minimize his procedures and visits. On carvedilol as above.   --Continue Eliquis, 5mg BID. He is hoping to have oral surgery in the near future, and will need to transiently hold this. See below.     3. Coronary artery disease.   --Long cardiac hx including MI in 2005 with proximal LAD stenting, repeat LAD stenting in 2009 and distal LAD stenting in 2013. In June 2017 he had stent placement to an obtuse marginal. Most recently, in July 2019, he was found to have progression of his disease and underwent SUSAN x 3 to the RCA. He tells me he's been free of chest pain since his last ED visit.    --He is currently on both Eliquis as above, and Plavix for recent stenting. He is having dental issues which are causing a lot of bleeding. I will touch base with Dr. Tomlin, for his recommendations on his anticoagulation/antiplatelet regimen perioperatively.    --Continue atorvastatin 40mg at bedtime. Last LDL under excellent control at 43, July 2019.    --He is chronically on Imdur, which I did not change today.      Follow up plan:  Return to [facility] in ~4 months, or sooner if needed. We are trying to space his visits due to transportation issues and cost.         Orders this Visit:  Orders Placed This Encounter   Procedures     Basic metabolic panel     N terminal pro BNP outpatient     CBC with platelets     Follow-Up with Cardiac Advanced Practice Provider     Orders Placed This Encounter   Medications     spironolactone (ALDACTONE) 25 MG tablet     Sig: Take 1 tablet (25 mg) by mouth daily     Dispense:  90 tablet     Refill:  3     Medications  Discontinued During This Encounter   Medication Reason     spironolactone (ALDACTONE) 25 MG tablet Reorder         CURRENT MEDICATIONS:  Current Outpatient Medications   Medication Sig Dispense Refill     apixaban ANTICOAGULANT (ELIQUIS) 5 MG tablet Take 1 tablet (5 mg) by mouth 2 times daily       atorvastatin (LIPITOR) 40 MG tablet Take 1 tablet (40 mg) by mouth every evening 90 tablet 3     carvedilol (COREG) 25 MG tablet Take 1 tablet (25 mg) by mouth 2 times daily (with meals) 180 tablet 3     Cholecalciferol (VITAMIN D3) 2000 units TABS Take 1 tablet by mouth daily       clopidogrel (PLAVIX) 75 MG tablet Take 1 tablet (75 mg) by mouth daily 90 tablet 3     furosemide (LASIX) 40 MG tablet Take 1 tablet (40 mg) by mouth daily 90 tablet 3     insulin aspart (NOVOLOG FLEXPEN) 100 UNIT/ML pen Inject 8 Units Subcutaneous daily (with lunch)       insulin aspart (NOVOLOG FLEXPEN) 100 UNIT/ML pen Inject 10 Units Subcutaneous daily (with dinner)       insulin aspart (NOVOLOG FLEXPEN) 100 UNIT/ML pen Inject Subcutaneous 3 times daily (with meals) Sliding Scale  Do not give sliding scale insulin if Pre-Meal BG less than 140.   For Pre-Meal:   - 200 give 2 units.    - 250 give 4 units.    - 300 give 6 units.    - 350 give 8 units.    - 400 give 10 units.       insulin aspart (NOVOLOG PEN) 100 UNIT/ML pen Inject 8 Units Subcutaneous daily (with breakfast)        insulin glargine (LANTUS SOLOSTAR PEN) 100 UNIT/ML pen Inject 36 Units Subcutaneous every morning       insulin pen needle 31G X 6 MM Use  as directed. 100 each prn     isosorbide mononitrate (IMDUR) 60 MG 24 hr tablet Take 1 tablet (60 mg) by mouth daily 90 tablet 1     levothyroxine (SYNTHROID, LEVOTHROID) 137 MCG tablet Take 137 mcg by mouth daily  90 tablet 3     lisinopril (PRINIVIL/ZESTRIL) 40 MG tablet Take 1 tablet (40 mg) by mouth daily 90 tablet 1     NONFORMULARY Topical patch for diabetes (blood glucose control) - changes  every 4 days or so       order for DME Equipment being ordered: Other: surgical shoe male XL  Treatment Diagnosis: sp right 2nd toe amputaion 1 Device 0     pantoprazole (PROTONIX) 40 MG enteric coated tablet Take 1 tablet (40 mg) by mouth every morning (before breakfast) 30 tablet 0     spironolactone (ALDACTONE) 25 MG tablet Take 1 tablet (25 mg) by mouth daily 90 tablet 3     nitroglycerin (NITROSTAT) 0.4 MG sublingual tablet Place 1 tablet (0.4 mg) under the tongue every 5 minutes as needed for chest pain (Patient not taking: Reported on 11/14/2019) 50 tablet 0     order for DME Equipment being ordered: Walker Wheels () and Walker ()  Treatment Diagnosis: Impaired gait, heel WB bilaterally. 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     Cardiomyopathy (H)      Cellulitis of left lower extremity 3/13/2018     Cerebral infarction (H)     eye sight decreased in peripheral of right side and blurry     Diabetic ulcer of left midfoot associated with type 2 diabetes mellitus, with fat layer exposed (H) 3/13/2018     Essential hypertension, benign 11/13/2002     Generalized osteoarthrosis, unspecified site 11/13/2002     Microalbuminuria 3/13/2018    X1     Myocardial infarction (H)      Neuropathy      Sepsis (H)      Sleep apnea     doesn't use it     Substance abuse (H)      Syncope, unspecified syncope type 3/13/2018     Thyroid disease     takes medicine     Tobacco use disorder 11/13/2002     Type 2 diabetes mellitus with diabetic peripheral angiopathy without gangrene, unspecified long term insulin use status 2005       PAST SURGICAL HISTORY:  Past Surgical History:   Procedure Laterality Date     AMPUTATE  TOE(S) Right 1/6/2019    Procedure: Right open second toe partial amputation;  Surgeon: Sabino Garcia DPM;  Location: [facility] OR     AMPUTATE TOE(S) Right 1/8/2019    Procedure: 1.  Revision right second toe amputation with resection of distal half of proximal phalanx with full closure.    2.  Debridement of callus/preulcerative lesion, distal left second toe and plantar left first metatarsophalangeal joint.;  Surgeon: Ryan Bhagat DPM;  Location: [facility] OR     AMPUTATE TOE(S) Right 3/12/2019    Procedure: Partial right fourth toe amputation.;  Surgeon: Ryan Bhagat DPM;  Location: [facility] OR     AMPUTATE TOE(S) Right 5/6/2019    Procedure: Right partial third toe amputation;  Surgeon: Татьяна Mckeon DPM, Podiatry/Foot and Ankle Surgery;  Location: [facility] OR     ANGIOGRAM  6/29/05    subtotal occ.mid LAD,SUSAN mid LAD     ANGIOGRAM  7/05    mild CAD,patent stent,no flow-limiting lesions,sev.LV dysf.LAD enlarged     ANGIOGRAM  2/09    Sev.single vessel disease,Mod LV dysf.distal LAD 90%,70-75% mid lad just before prev stent,SUSAN to prox.mid LAD, endeavor to distal LAD     ANGIOGRAM  11/13/13    restenosis, stent LAD     BACK SURGERY      back surgery x3     C NONSPECIFIC PROCEDURE      Laminectomy x 3 - (1983 x 2 & 1990)     C NONSPECIFIC PROCEDURE Bilateral 1998    Bunionectomy     C NONSPECIFIC PROCEDURE  1959    Gingival surgery at age 9     CV CORONARY ANGIOGRAM N/A 7/8/2019    Procedure: Coronary Angiogram;  Surgeon: Jose Alfredo Crockett MD;  Location:  HEART CARDIAC CATH LAB     CV LEFT HEART CATH N/A 7/8/2019    Procedure: Left Heart Cath;  Surgeon: Jose Alfredo Crockett MD;  Location:  HE[facility]ARD[facility] CATH LAB     CV PCI ASPIRATION THROMECTOMY N/A 7/8/2019    Procedure: PCI Aspiration Thrombectomy;  Surgeon: Jose Alfredo Crockett MD;  Location:  HEART CARDIAC CATH LAB     CV PCI STENT DRUG ELUTING N/A 7/8/2019    Procedure: PCI Stent Drug Eluting;  Surgeon: Jose Alfredo Crockett MD;  Location:  HEART CARDIAC  CATH LAB     HEART CATH LEFT HEART CATH  2017    SUSAN to OM3     INCISION AND DRAINAGE TOE, COMBINED Bilateral 2018    Procedure: COMBINED INCISION AND DRAINAGE TOE;  1) Irrigation and debridement ulcer left great toe  2) Amputation 3rd toe right foot at distal interphalangeal joint level;  Surgeon: Miki Harp MD;  Location: [facility] OR     IRRIGATION AND DEBRIDEMENT FOOT, COMBINED Left 3/12/2019    Procedure: Debridement of left foot ulcer sub first MPJ 2 x 2.5 cm down to and including subcutaneous tissue, callus debridement for preulcerative lesion, left second toe.;  Surgeon: Ryan Bhagat DPM;  Location: [facility] OR     ORTHOPEDIC SURGERY      bunion surgery both feet     ORTHOPEDIC SURGERY      left shoulder     SOFT TISSUE SURGERY      debridement of toe numerous time     VASCULAR SURGERY      7 stents in heart       FAMILY HISTORY:  Family History   Problem Relation Age of Onset     Cancer - colorectal Sister      Thyroid Disease Brother      Cardiovascular Brother      Diabetes Brother      Cancer Father         tumor in chest and throat     Arthritis Mother      Thyroid Disease Mother      Diabetes Mother      Glaucoma No family hx of      Macular Degeneration No family hx of        SOCIAL HISTORY:  Social History     Socioeconomic History     Marital status:      Spouse name: None     Number of children: None     Years of education: None     Highest education level: None   Occupational History     None   Social Needs     Financial resource strain: None     Food insecurity:     Worry: None     Inability: None     Transportation needs:     Medical: None     Non-medical: None   Tobacco Use     Smoking status: Former Smoker     Packs/day: 1.00     Years: 25.00     Pack years: 25.00     Types: Cigarettes     Last attempt to quit: 2005     Years since quittin.3     Smokeless tobacco: Never Used   Substance and Sexual Activity     Alcohol use: No     Alcohol/week: 4.0 standard  drinks     Types: 4 Shots of liquor per week     Frequency: Never     Drug use: No     Sexual activity: Yes     Partners: Female   Lifestyle     Physical activity:     Days per week: None     Minutes per session: None     Stress: None   Relationships     Social connections:     Talks on phone: None     Gets together: None     Attends [employer] service: None     Active member of club or organization: None     Attends meetings of clubs or organizations: None     Relationship status: None     Intimate partner violence:     Fear of current or ex partner: None     Emotionally abused: None     Physically abused: None     Forced sexual activity: None   Other Topics Concern     Parent/sibling w/ CABG, MI or angioplasty before 65F 55M? Yes      Service Not Asked     Blood Transfusions Not Asked     Caffeine Concern No     Comment: 3 cups daily     Occupational Exposure Not Asked     Hobby Hazards Not Asked     Sleep Concern Not Asked     Stress Concern Not Asked     Weight Concern Not Asked     Special Diet Yes     Comment: watching carb intake     Back Care Not Asked     Exercise No     Comment: some walking     Bike Helmet Not Asked     Seat Belt Not Asked     Self-Exams Not Asked   Social History Narrative     None       Review of Systems:  Cardiovascular: neg chest pain, pos palpitations per HPI, neg orthopnea, worsening LE edema  Constitutional: negative for chills, sweats, fevers.   Resp: Negative for dyspnea at rest, dyspnea on exertion, cough, known chronic lung disease  HEENT: Negative for new visual changes, neg new headaches.  Gastrointestinal: negative for abdominal pain, pos diarrhea, neg nausea, vomiting  Hematologic/lymphatic: pos for current systemic anticoagulation, hx of CVA   Musculoskeletal: negative for new back pain, joint pain. Pos foot pain.  Neurological: negative for focal weakness, LOC, seizures, syncope/presyncope. Pos occasional dizziness.       Physical Exam:  Vitals: /74 (BP  "Location: Right arm, Patient Position: Chair, Cuff Size: Adult Regular)   Pulse 80   Ht 1.93 m (6' 4\")   Wt 114.5 kg (252 lb 5.8 oz)   SpO2 98%   BMI 30.72 kg/m      Wt Readings from Last 4 Encounters:   11/14/19 114.5 kg (252 lb 5.8 oz)   11/13/19 110.2 kg (243 lb)   10/16/19 110.2 kg (243 lb)   10/09/19 110.2 kg (243 lb)       GEN:  In general, this is a well nourished  male in no acute distress on room air.  Patient ambulatory, unaccompanied today.  HEENT:  Pupils equal, round. Sclerae nonicteric.   NECK: Supple, no masses appreciated. Trachea midline. No JVD.   C/V:  Irregular rhythm, No murmur, rub or gallop.   RESP: Respirations are unlabored. No use of accessory muscles. Clear to auscultation bilaterally without wheezing, rales, or rhonchi.  GI: Abdomen soft, nontender, nondistended.   EXTREM:  Trace nonpitting bilateral PT edema, right ankle wrapped. No cyanosis or clubbing.  NEURO: Alert and oriented, cooperative. Gait not formally assessed. No obvious focal deficits.   SKIN: Warm and dry. No rashes or petechiae appreciated.       Recent Lab Results:        BMP RESULTS:  Lab Results   Component Value Date     11/14/2019    POTASSIUM 4.0 11/14/2019    CHLORIDE 107 11/14/2019    CO2 25 11/14/2019    ANIONGAP 5 11/14/2019     (H) 11/14/2019    BUN 24 11/14/2019    CR 1.17 11/14/2019    GFRESTIMATED 63 11/14/2019    GFRESTBLACK 73 11/14/2019    BETTIE 8.8 11/14/2019        New/Pertinent imaging results since last visit:  Echo 7/6/19     Interpretation Summary     The left ventricle is normal in size. Proximal septal thickening is noted.  Left ventricular systolic function is moderately reduced. The visual ejection  fraction is estimated at 35-40%. LVEF 36% based on biplane 2D tracing.  Diastolic function not assessed due to atrial fibrillation. There is akinesis  of the mid to apical anterior walls, the mid anteroseptal and apical septal  walls and the true LV apex. There is no thrombus " seen in the left ventricle.  There is no thrombus seen in the left ventricle.  The right ventricle is normal size. Mildly decreased right ventricular  systolic function.  Trace to mild mitral and tricuspid regurgitation.  No pericardial effusion.  In comparison to the previous report dated 01/18/2019, the findings are  Similar.      Gill Doty PA-C  [facility]  Pager [phone]          Thank you for allowing me to participate in the care of your patient.      Sincerely,     CONCEPCION Dasilva     [facility]    cc:   CONCEPCION Dasilva  [facility]  [address] [place] [id number]

## 2019-11-19 ENCOUNTER — TELEPHONE (OUTPATIENT)
Dept: CARDIOLOGY | Facility: CLINIC | Age: 69
End: 2019-11-19

## 2019-11-19 NOTE — TELEPHONE ENCOUNTER
Received message below from CONCEPCION Garland. Called and spoke with pt. Communicated that Dr. Tomlin is OK with pt holding Eliquis for the oral surgery but not Plavix due to his recent stent placement. Pt verbalized understanding and stated he was told to ask his cardiologist how many days he should hold Eliquis. Advised pt will clarify with Dr. Tomlin and call back.     ----- Message from CONCEPCION Dasilva sent at 11/19/2019 10:57 AM CST -----  Regarding: FW: oral surgery?  Can you call the patient and relay to him the response from Dr Tomlin please?    Thanks,Gill  ----- Message -----  From: Justin Tomlin MD  Sent: 11/19/2019   7:42 AM CST  To: CONCEPCION Dasilva  Subject: RE: oral surgery?                                It is okay to go off of apixaban long enough for his oral surgery.  But they need to be able to do it on his Plavix.  I would not stop that now and go bare in to oral surgery unless extraordinary circumstance.  ----- Message -----  From: Gill Doty PA  Sent: 11/14/2019  10:39 AM CST  To: Justin Tomlin MD  Subject: oral surgery?                                    Saw Jayro today and he wants to know if he can go ahead with the oral surgery. He has a few teeth that are broken off and he's having bleeding nightly, having to change his sheets, and swallowing a lot of blood at night.  He's on Eliquis for afib, but also Plavix for recent SUSAN x 3 to the RCA in July 2019.    I'd like your guidance on whether it's ok, and if so, instructions on holding/bridging please.     Thanks!  Gill

## 2019-11-20 NOTE — TELEPHONE ENCOUNTER
Received response below from Dr. Tomlin:     Justin Tomlin MD Hair, Ashley W RN   Caller: Unspecified (Yesterday,  3:51 PM)             Hold Eliquis for 3 days prior to oral surgery.  Resume soon postoperatively as the surgeon will allow, hopefully in 1 or 2 days      Called back to pt and communicated recommendations. Pt verbalized understanding, no further questions at this time.

## 2019-12-04 ENCOUNTER — OFFICE VISIT (OUTPATIENT)
Dept: PODIATRY | Facility: CLINIC | Age: 69
End: 2019-12-04
Payer: COMMERCIAL

## 2019-12-04 VITALS
DIASTOLIC BLOOD PRESSURE: 66 MMHG | HEIGHT: 76 IN | SYSTOLIC BLOOD PRESSURE: 122 MMHG | WEIGHT: 252 LBS | BODY MASS INDEX: 30.69 KG/M2

## 2019-12-04 DIAGNOSIS — R58 BLEEDING: ICD-10-CM

## 2019-12-04 DIAGNOSIS — E11.42 DIABETIC POLYNEUROPATHY ASSOCIATED WITH TYPE 2 DIABETES MELLITUS (H): Primary | ICD-10-CM

## 2019-12-04 DIAGNOSIS — Z89.421 H/O AMPUTATION OF LESSER TOE, RIGHT (H): ICD-10-CM

## 2019-12-04 DIAGNOSIS — L97.522 ULCER OF LEFT FOOT, WITH FAT LAYER EXPOSED (H): ICD-10-CM

## 2019-12-04 PROCEDURE — 11042 DBRDMT SUBQ TIS 1ST 20SQCM/<: CPT | Mod: 22 | Performed by: PODIATRIST

## 2019-12-04 PROCEDURE — 99213 OFFICE O/P EST LOW 20 MIN: CPT | Mod: 25 | Performed by: PODIATRIST

## 2019-12-04 ASSESSMENT — MIFFLIN-ST. JEOR: SCORE: 2009.56

## 2019-12-04 NOTE — PATIENT INSTRUCTIONS
Thank you for choosing [facility]!    DR. ASENCIO'S CLINIC SCHEDULE  MONDAY AM - [place] TUESDAY - [place]   [address] [address] [place] [address] [place] [address]   [phone] / [phone] / [phone]       WEDNESDAY - [place] FRIDAY AM - [facility]   [address]   [place] [address] [place] [address]   [phone] / [phone]       FRIDAY PM - [place] SCHEDULE SURGERY: [phone]   [address] [facility] [address] BILLING QUESTIONS: [phone]   [place] [address] AFTER HOURS: [phone]   [phone] / [phone] APPOINTMENTS: [phone]     [employer] (CPL) [phone]           BODY WEIGHT AND YOUR FEET  The following information is included in the after visit summary for all patients. Body weight can be a sensitive issue to discuss in clinic, but we think the following information is very important. Although we focus on the feet and ankles, we do support the overall health of our patients.     Many things can cause foot and ankle problems. Foot structure, activity level, foot mechanics and injuries are common causes of pain. One very important issue that often goes unmentioned, is body weight. Extra weight can cause increased stress on muscles, ligaments, bones and tendons. Sometimes just a few extra pounds is all it takes to put one over her/his threshold. Without reducing that stress, it can be difficult to alleviate pain. As Foot & Ankle specialists, our job is addressing the lower extremity problem and possible causes. Regarding extra body weight, we encourage patients to discuss diet and weight management plans with their primary care doctors. It is this team approach that gives you the best opportunity for pain relief and getting you back on your feet.      [facility] has a Comprehensive Weight Management Program. This program includes counseling,  education, non-surgical and surgical approaches to weight loss. If you are interested in learning more either talk to you primary care provider or call [phone].

## 2019-12-04 NOTE — LETTER
"    12/4/2019         RE: Jayro Elder  [address] [place] [address]        Dear Colleague,    Thank you for referring your patient, Jayro Elder, [facility] Please see a copy of my visit note below.    Podiatry / Foot and Ankle Surgery Progress Note    December 4, 2019    Subject: Patient presented to clinic urgently due to \"leaking\" of the left foot. States it has been bleeding for last 2 weeks. Can't get it to stop.  Denies fever, chills, nausa, light headed ness.     Objective:  Vitals: /66   Ht 1.93 m (6' 4\")   Wt 114.3 kg (252 lb)   BMI 30.67 kg/m     BMI= Body mass index is 30.67 kg/m .    A1C: 8.8 (7/2019)     General:  Patient is alert and orientated.  NAD     Vascular:  DP and PT pulses are palpable.  No varicosities noted  CFT's < 3secs.  Skin temp is normal     Neuro:  Light and gross touch sensation absent to feet.      Derm:  per ulcerative hyperkeratotic lesions to plantar left 1st metatarsal heads and distal left 2nd and 3rd toes, reulceration to planar left 1st metatarsal head measures 2.5cm x 2.5cm x 0.2cm  after debridement. No purulent drainage or redness or acute signs of infection. .        Musculoskeletal: multiple previous toe amputations right foot.       Assessment:    Diabetic polyneuropathy associated with type 2 diabetes mellitus (H)  Ulcer of left foot, with fat layer exposed (H)  Hammer toes of both feet  Pre-ulcerative calluses  H/O amputation of lesser toe, right (H)     Plan: ulcer debrided and bleeding controlled with below.  discussed he needs to be off the foot in a boot to keep pressure off of the area as he is on blood thinners and bleeds easily. If bleeding recurs, he needs to to the ER and may need his blood thinner adjusted so his hemoglobin doesn't drop. . Needs to keep compression on it with coban and gauze.       Procedure: After verbal consent, excisional debridement was performed on ulcer.  #15 blade was used " to debride ulcer down to and including subcutaneous tissue. Bleeding controlled with silvernitrate, cautery, and 4-0 vicryl. Over 30 minutes was spent trying to control bleeding.     No anesthesia was used due to neuropathy. Dry dressing applied to foot.  Patient tolerated procedure well.    Татьяна Mckeon DPM, Podiatry/Foot and Ankle Surgery    Weight management plan: Patient was referred to their PCP to discuss a diet and exercise plan.    Recommended to Jayro Elder to follow up with Primary Care provider regarding elevated blood pressure.      Again, thank you for allowing me to participate in the care of your patient.        Sincerely,        Татьяна Mckeon DPM, Podiatry/Foot and Ankle Surgery

## 2019-12-04 NOTE — PROGRESS NOTES
"Podiatry / Foot and Ankle Surgery Progress Note    December 4, 2019    Subject: Patient presented to clinic urgently due to \"leaking\" of the left foot. States it has been bleeding for last 2 weeks. Can't get it to stop.  Denies fever, chills, nausa, light headed ness.     Objective:  Vitals: /66   Ht 1.93 m (6' 4\")   Wt 114.3 kg (252 lb)   BMI 30.67 kg/m    BMI= Body mass index is 30.67 kg/m .    A1C: 8.8 (7/2019)     General:  Patient is alert and orientated.  NAD     Vascular:  DP and PT pulses are palpable.  No varicosities noted  CFT's < 3secs.  Skin temp is normal     Neuro:  Light and gross touch sensation absent to feet.      Derm:  per ulcerative hyperkeratotic lesions to plantar left 1st metatarsal heads and distal left 2nd and 3rd toes, reulceration to planar left 1st metatarsal head measures 2.5cm x 2.5cm x 0.2cm  after debridement. No purulent drainage or redness or acute signs of infection. .        Musculoskeletal: multiple previous toe amputations right foot.       Assessment:    Diabetic polyneuropathy associated with type 2 diabetes mellitus (H)  Ulcer of left foot, with fat layer exposed (H)  Hammer toes of both feet  Pre-ulcerative calluses  H/O amputation of lesser toe, right (H)     Plan: ulcer debrided and bleeding controlled with below.  discussed he needs to be off the foot in a boot to keep pressure off of the area as he is on blood thinners and bleeds easily. If bleeding recurs, he needs to to the ER and may need his blood thinner adjusted so his hemoglobin doesn't drop. . Needs to keep compression on it with coban and gauze.       Procedure: After verbal consent, excisional debridement was performed on ulcer.  #15 blade was used to debride ulcer down to and including subcutaneous tissue. Bleeding controlled with silvernitrate, cautery, and 4-0 vicryl. Over 30 minutes was spent trying to control bleeding.     No anesthesia was used due to neuropathy. Dry dressing applied to foot. "  Patient tolerated procedure well.    Татьяна Mckeon DPM, Podiatry/Foot and Ankle Surgery    Weight management plan: Patient was referred to their PCP to discuss a diet and exercise plan.    Recommended to Jayro Elder to follow up with Primary Care provider regarding elevated blood pressure.

## 2020-01-01 LAB — RETINOPATHY: POSITIVE

## 2020-01-02 NOTE — PROGRESS NOTES
Subjective     Jayro Elder is a 69 year old male who presents to clinic today for the following health issues:    HPI   Musculoskeletal problem/pain      Duration: F/U from surgery     Description  Location: Bilateral feet     Intensity:  moderate    Accompanying signs and symptoms: radiation of pain to legs, restless legs     History  Previous similar problem: YES  Previous evaluation: Recent surgery     Precipitating or alleviating factors:  Trauma or overuse: no   Aggravating factors include: none    Therapies tried and outcome: Tylenol, Advil, Hydrocodone - someone else's script     Jayro Elder is a 69 year old male who presents today for evaluation of chronic foot pain  It is causing him to lose sleep and his wife as well because he is kicking them around  The feet and legs feel restless  Pain is chronic but the restlessness starts in the eveneing   Ankles feel like they will blow up  Pain/sxs will move up to mid LE; rarely into/past the knees  He has noted this been going on for years  There are no rashes    He has a dx of MARISOL; did NOT tolerate the CPAP        Patient Active Problem List   Diagnosis     Generalized osteoarthrosis, unspecified site     Tobacco use disorder     Hypertension goal BP (blood pressure) < 140/90     Benign neoplasm of lower jaw bone     Abdominal pain, right upper quadrant     Diabetes mellitus, type 2 (H)     Cardiovascular disease     CARDIOVASCULAR SCREENING; LDL GOAL LESS THAN 100     Health Care Home     Cardiomyopathy (H)     CVA (cerebral vascular accident) (H)     Coronary artery disease involving native coronary artery of native heart with angina pectoris (H)     Status post coronary angiogram     Chronic atrial fibrillation     Homonymous hemianopsia, right     Cellulitis of left lower extremity     Syncope, unspecified syncope type     Diabetic ulcer of left midfoot associated with type 2 diabetes mellitus, with fat layer exposed (H)     Type 2 diabetes mellitus  with diabetic peripheral angiopathy without gangrene (H)     Microalbuminuria     Pressure ulcer of toe of right foot, stage 3 (H)     Cellulitis     Bacteremia     CHF (congestive heart failure) (H)     History of CVA (cerebrovascular accident)     Hyperlipidemia     Hypothyroidism     Non-toxic nodular goiter     Acute osteomyelitis (H)     Post-operative state     Acute chest pain     Unstable angina (H)     Hypertension     H/O amputation of lesser toe, right (H)     Atrial fibrillation, unspecified type (H)     Past Surgical History:   Procedure Laterality Date     AMPUTATE TOE(S) Right 1/6/2019    Procedure: Right open second toe partial amputation;  Surgeon: Sabino Garcia DPM;  Location: [facility] OR     AMPUTATE TOE(S) Right 1/8/2019    Procedure: 1.  Revision right second toe amputation with resection of distal half of proximal phalanx with full closure.    2.  Debridement of callus/preulcerative lesion, distal left second toe and plantar left first metatarsophalangeal joint.;  Surgeon: Ryan Bhagat DPM;  Location: [facility] OR     AMPUTATE TOE(S) Right 3/12/2019    Procedure: Partial right fourth toe amputation.;  Surgeon: Ryan Bhagat DPM;  Location: [facility] OR     AMPUTATE TOE(S) Right 5/6/2019    Procedure: Right partial third toe amputation;  Surgeon: Татьяна Mckeon DPM, Podiatry/Foot and Ankle Surgery;  Location: [facility] OR     ANGIOGRAM  6/29/05    subtotal occ.mid LAD,SUSAN mid LAD     ANGIOGRAM  7/05    mild CAD,patent stent,no flow-limiting lesions,sev.LV dysf.LAD enlarged     ANGIOGRAM  2/09    Sev.single vessel disease,Mod LV dysf.distal LAD 90%,70-75% mid lad just before prev stent,SUSAN to prox.mid LAD, endeavor to distal LAD     ANGIOGRAM  11/13/13    restenosis, stent LAD     BACK SURGERY      back surgery x3     C NONSPECIFIC PROCEDURE      Laminectomy x 3 - (1983 x 2 & 1990)     C NONSPECIFIC PROCEDURE Bilateral 1998    Bunionectomy     C NONSPECIFIC PROCEDURE  1959    Gingival surgery at age  9     CV CORONARY ANGIOGRAM N/A 2019    Procedure: Coronary Angiogram;  Surgeon: Jose Alfredo Crockett MD;  Location:  [facility]ARDI[facility] CATH LAB     CV LEFT HEART CATH N/A 2019    Procedure: Left Heart Cath;  Surgeon: Jose Alfredo Crockett MD;  Location:  [facility]     CV PCI ASPIRATION THROMECTOMY N/A 2019    Procedure: PCI Aspiration Thrombectomy;  Surgeon: Jose Alfredo Crockett MD;  Location:  HE[facility]ARD[facility] CATH LAB     CV PCI STENT DRUG ELUTING N/A 2019    Procedure: PCI Stent Drug Eluting;  Surgeon: Jose Alfredo Crockett MD;  Location: [facility]     HEART CATH LEFT HEART CATH  2017    SUSAN to OM3     INCISION AND DRAINAGE TOE, COMBINED Bilateral 2018    Procedure: COMBINED INCISION AND DRAINAGE TOE;  1) Irrigation and debridement ulcer left great toe  2) Amputation 3rd toe right foot at distal interphalangeal joint level;  Surgeon: Miki Harp MD;  Location:  OR     IRRIGATION AND DEBRIDEMENT FOOT, COMBINED Left 3/12/2019    Procedure: Debridement of left foot ulcer sub first MPJ 2 x 2.5 cm down to and including subcutaneous tissue, callus debridement for preulcerative lesion, left second toe.;  Surgeon: Ryan Bhagat DPM;  Location:  OR     ORTHOPEDIC SURGERY      bunion surgery both feet     ORTHOPEDIC SURGERY      left shoulder     SOFT TISSUE SURGERY      debridement of toe numerous time     VASCULAR SURGERY      7 stents in heart       Social History     Tobacco Use     Smoking status: Former Smoker     Packs/day: 1.00     Years: 25.00     Pack years: 25.00     Types: Cigarettes     Last attempt to quit: 2005     Years since quittin.4     Smokeless tobacco: Never Used   Substance Use Topics     Alcohol use: No     Alcohol/week: 4.0 standard drinks     Types: 4 Shots of liquor per week     Frequency: Never     Family History   Problem Relation Age of Onset     Cancer - colorectal Sister      Thyroid Disease Brother       "Cardiovascular Brother      Diabetes Brother      Cancer Father         tumor in chest and throat     Arthritis Mother      Thyroid Disease Mother      Diabetes Mother      Glaucoma No family hx of      Macular Degeneration No family hx of            Reviewed and updated as needed this visit by Provider         Review of Systems   ROS COMP: Constitutional, HEENT, cardiovascular, pulmonary, gi and gu systems are negative, except as otherwise noted.      Objective    /62   Pulse 85   Temp 97  F (36.1  C) (Tympanic)   Resp 16   Ht 1.93 m (6' 4\")   Wt 114.5 kg (252 lb 6.4 oz)   SpO2 97%   BMI 30.72 kg/m    Body mass index is 30.72 kg/m .  Physical Exam   GENERAL: healthy, alert and no distress  RESP: lungs clear to auscultation - no rales, rhonchi or wheezes  CV: irregularly irregular rhythm  MS: did not remove footwear today. He has walking shoe on the left but has been wearing boot.   PSYCH: mentation appears normal, affect normal/bright    Diagnostic Test Results:  Labs reviewed in Epic        Assessment & Plan     1. Type 2 diabetes mellitus with diabetic peripheral angiopathy without gangrene, with long-term current use of insulin (H)  2. Type 2 diabetes mellitus with diabetic nephropathy, with long-term current use of insulin (H)  3. Restless legs syndrome (RLS)  4. H/O amputation of lesser toe, right (H)  Jayro has numerous chronic medical conditions and uncontrolled diabetes is one of them. He has significant neuropathy of the limps bilaterally. He has been noting increased pain/irritation and restlessness. We will screen his ferritin and have him slowly increase gabapentin. I suspect he may need higher doses but I would like to steadily work this upwards. We reviewed that plan and he agreed. We should do some type of visit for follow up after no later than 3 months to confirm effectiveness and reconsider dosing  - Ferritin  - gabapentin (NEURONTIN) 100 MG capsule; Take 1 capsule (100 mg) by mouth " At Bedtime (2 hours prior to bedtime) Increase by 1 capsule every week until at 300mg (3 caps)  Dispense: 90 capsule; Refill: 0      5. Atrial fibrillation, unspecified type (H)  6. Coronary artery disease involving native coronary artery of native heart with angina pectoris (H)  Stable. He continues with Koby Doty and Sada.    7. Need for prophylactic vaccination and inoculation against influenza  - INFLUENZA (HIGH DOSE) 3 VALENT VACCINE [38089]  - Vaccine Administration, Initial [74694]       Return in about 3 months (around 4/6/2020) for Follow up after starting the medication.    Davion Cordova PA-C  [facility]

## 2020-01-06 ENCOUNTER — OFFICE VISIT (OUTPATIENT)
Dept: FAMILY MEDICINE | Facility: CLINIC | Age: 70
End: 2020-01-06
Payer: COMMERCIAL

## 2020-01-06 VITALS
TEMPERATURE: 97 F | HEIGHT: 76 IN | RESPIRATION RATE: 16 BRPM | OXYGEN SATURATION: 97 % | WEIGHT: 252.4 LBS | HEART RATE: 85 BPM | SYSTOLIC BLOOD PRESSURE: 110 MMHG | BODY MASS INDEX: 30.74 KG/M2 | DIASTOLIC BLOOD PRESSURE: 62 MMHG

## 2020-01-06 DIAGNOSIS — G25.81 RESTLESS LEGS SYNDROME (RLS): ICD-10-CM

## 2020-01-06 DIAGNOSIS — E11.51 TYPE 2 DIABETES MELLITUS WITH DIABETIC PERIPHERAL ANGIOPATHY WITHOUT GANGRENE, WITH LONG-TERM CURRENT USE OF INSULIN (H): Primary | ICD-10-CM

## 2020-01-06 DIAGNOSIS — Z79.4 TYPE 2 DIABETES MELLITUS WITH DIABETIC NEPHROPATHY, WITH LONG-TERM CURRENT USE OF INSULIN (H): ICD-10-CM

## 2020-01-06 DIAGNOSIS — Z23 NEED FOR PROPHYLACTIC VACCINATION AND INOCULATION AGAINST INFLUENZA: ICD-10-CM

## 2020-01-06 DIAGNOSIS — Z89.421 H/O AMPUTATION OF LESSER TOE, RIGHT (H): ICD-10-CM

## 2020-01-06 DIAGNOSIS — I48.91 ATRIAL FIBRILLATION, UNSPECIFIED TYPE (H): ICD-10-CM

## 2020-01-06 DIAGNOSIS — I25.119 CORONARY ARTERY DISEASE INVOLVING NATIVE CORONARY ARTERY OF NATIVE HEART WITH ANGINA PECTORIS (H): ICD-10-CM

## 2020-01-06 DIAGNOSIS — E11.21 TYPE 2 DIABETES MELLITUS WITH DIABETIC NEPHROPATHY, WITH LONG-TERM CURRENT USE OF INSULIN (H): ICD-10-CM

## 2020-01-06 DIAGNOSIS — Z79.4 TYPE 2 DIABETES MELLITUS WITH DIABETIC PERIPHERAL ANGIOPATHY WITHOUT GANGRENE, WITH LONG-TERM CURRENT USE OF INSULIN (H): Primary | ICD-10-CM

## 2020-01-06 PROCEDURE — G0008 ADMIN INFLUENZA VIRUS VAC: HCPCS | Performed by: PHYSICIAN ASSISTANT

## 2020-01-06 PROCEDURE — 90662 IIV NO PRSV INCREASED AG IM: CPT | Performed by: PHYSICIAN ASSISTANT

## 2020-01-06 PROCEDURE — 82728 ASSAY OF FERRITIN: CPT | Performed by: PHYSICIAN ASSISTANT

## 2020-01-06 PROCEDURE — 99214 OFFICE O/P EST MOD 30 MIN: CPT | Mod: 25 | Performed by: PHYSICIAN ASSISTANT

## 2020-01-06 PROCEDURE — 36415 COLL VENOUS BLD VENIPUNCTURE: CPT | Performed by: PHYSICIAN ASSISTANT

## 2020-01-06 RX ORDER — GABAPENTIN 100 MG/1
100 CAPSULE ORAL AT BEDTIME
Qty: 90 CAPSULE | Refills: 0 | Status: SHIPPED | OUTPATIENT
Start: 2020-01-06 | End: 2020-02-03

## 2020-01-06 RX ORDER — GABAPENTIN 100 MG/1
100 CAPSULE ORAL AT BEDTIME
Qty: 90 CAPSULE | Refills: 0 | Status: SHIPPED | OUTPATIENT
Start: 2020-01-06 | End: 2020-01-06

## 2020-01-06 ASSESSMENT — MIFFLIN-ST. JEOR: SCORE: 2011.38

## 2020-01-07 LAB — FERRITIN SERPL-MCNC: 197 NG/ML (ref 26–388)

## 2020-01-09 PROBLEM — I48.91 ATRIAL FIBRILLATION, UNSPECIFIED TYPE (H): Status: ACTIVE | Noted: 2020-01-09

## 2020-01-09 PROBLEM — Z89.421 H/O AMPUTATION OF LESSER TOE, RIGHT (H): Status: ACTIVE | Noted: 2020-01-09

## 2020-01-17 ENCOUNTER — COMMUNICATION - HEALTHEAST (OUTPATIENT)
Dept: CARDIOLOGY | Facility: CLINIC | Age: 70
End: 2020-01-17

## 2020-01-22 ENCOUNTER — DOCUMENTATION ONLY (OUTPATIENT)
Dept: CARDIOLOGY | Facility: CLINIC | Age: 70
End: 2020-01-22

## 2020-01-22 DIAGNOSIS — I25.10 CARDIOVASCULAR DISEASE: Primary | ICD-10-CM

## 2020-01-22 NOTE — PROGRESS NOTES
"Chart reviewed for CORE visit 1/23/20 w/CONCEPCION Andrea. Orders placed 11/14/19 for follow-up March 2020. Requested that scheduling staff call patient to reschedule for March. Patient c/o dizziness and visual disturbance. Staff asked for further follow-up w/patient.     I spoke with the patient who stated he wasn't having any \"bouts\" of sxs currently. Patient described occ dizziness and visual disturbances, but he r/t diabetes and are not new symptoms for him. Patient has not followed-up with PCP or endocrinologist recently per his report. I advised patient to be seen w/Gill on 1/23/20 if he was concerned about sxs - pt not reporting SOB/ANDREWS, edema, chest pain. Patient decided to reschedule for March w/labs. CORE visit rescheduled for 3/20/20 w/CONCEPCION Andrea.     Henrry Syed LPN  [facility]  [phone]      "

## 2020-01-27 ENCOUNTER — AMBULATORY - HEALTHEAST (OUTPATIENT)
Dept: CARDIOLOGY | Facility: CLINIC | Age: 70
End: 2020-01-27

## 2020-01-27 DIAGNOSIS — I48.20 CHRONIC ATRIAL FIBRILLATION (H): ICD-10-CM

## 2020-01-27 DIAGNOSIS — I47.20 VT (VENTRICULAR TACHYCARDIA) (H): ICD-10-CM

## 2020-01-27 DIAGNOSIS — Z00.6 EXAMINATION OF PARTICIPANT IN CLINICAL TRIAL: ICD-10-CM

## 2020-01-27 DIAGNOSIS — I48.3 TYPICAL ATRIAL FLUTTER (H): ICD-10-CM

## 2020-01-27 LAB
ATRIAL RATE - MUSE: 122 BPM
DIASTOLIC BLOOD PRESSURE - MUSE: NORMAL
INTERPRETATION ECG - MUSE: NORMAL
P AXIS - MUSE: NORMAL
PR INTERVAL - MUSE: NORMAL
QRS DURATION - MUSE: 140 MS
QT - MUSE: 396 MS
QTC - MUSE: 473 MS
R AXIS - MUSE: -42 DEGREES
SYSTOLIC BLOOD PRESSURE - MUSE: NORMAL
T AXIS - MUSE: -17 DEGREES
VENTRICULAR RATE- MUSE: 86 BPM

## 2020-01-27 ASSESSMENT — MIFFLIN-ST. JEOR: SCORE: 1950.35

## 2020-01-30 ENCOUNTER — AMBULATORY - HEALTHEAST (OUTPATIENT)
Dept: CARDIOLOGY | Facility: CLINIC | Age: 70
End: 2020-01-30

## 2020-01-31 ENCOUNTER — OFFICE VISIT (OUTPATIENT)
Dept: URGENT CARE | Facility: URGENT CARE | Age: 70
End: 2020-01-31
Payer: COMMERCIAL

## 2020-01-31 VITALS
BODY MASS INDEX: 30.43 KG/M2 | HEART RATE: 77 BPM | SYSTOLIC BLOOD PRESSURE: 112 MMHG | TEMPERATURE: 97.9 F | OXYGEN SATURATION: 97 % | DIASTOLIC BLOOD PRESSURE: 68 MMHG | WEIGHT: 250 LBS | RESPIRATION RATE: 18 BRPM

## 2020-01-31 DIAGNOSIS — E11.21 TYPE 2 DIABETES MELLITUS WITH DIABETIC NEPHROPATHY, WITH LONG-TERM CURRENT USE OF INSULIN (H): ICD-10-CM

## 2020-01-31 DIAGNOSIS — Z79.4 TYPE 2 DIABETES MELLITUS WITH DIABETIC NEPHROPATHY, WITH LONG-TERM CURRENT USE OF INSULIN (H): ICD-10-CM

## 2020-01-31 DIAGNOSIS — R05.9 COUGH: ICD-10-CM

## 2020-01-31 DIAGNOSIS — G25.81 RESTLESS LEGS SYNDROME (RLS): ICD-10-CM

## 2020-01-31 DIAGNOSIS — R42 DIZZINESS: ICD-10-CM

## 2020-01-31 DIAGNOSIS — J00 ACUTE RHINITIS: Primary | ICD-10-CM

## 2020-01-31 PROCEDURE — 99214 OFFICE O/P EST MOD 30 MIN: CPT | Performed by: FAMILY MEDICINE

## 2020-01-31 RX ORDER — DOXYCYCLINE 100 MG/1
100 CAPSULE ORAL 2 TIMES DAILY
Qty: 20 CAPSULE | Refills: 0 | Status: SHIPPED | OUTPATIENT
Start: 2020-01-31 | End: 2020-03-20

## 2020-01-31 RX ORDER — BENZONATATE 100 MG/1
200 CAPSULE ORAL 3 TIMES DAILY PRN
Qty: 42 CAPSULE | Refills: 3 | Status: SHIPPED | OUTPATIENT
Start: 2020-01-31 | End: 2020-04-13

## 2020-01-31 RX ORDER — FLUTICASONE PROPIONATE 50 MCG
2 SPRAY, SUSPENSION (ML) NASAL DAILY
Qty: 18.2 ML | Refills: 1 | Status: SHIPPED | OUTPATIENT
Start: 2020-01-31 | End: 2020-05-28

## 2020-01-31 NOTE — TELEPHONE ENCOUNTER
Requested Prescriptions   Pending Prescriptions Disp Refills     gabapentin (NEURONTIN) 100 MG capsule [Pharmacy Med Name: GABAPENTIN 100 MG CAPSULE] 90 capsule 0     Sig: TAKE 1 CAPS BY MOUTH AT BEDTIME (2 HRS PRIOR TO BEDTIME) INCREASE BY 1 CAP EVERY WEEK UNTIL AT 300MG   Last Written Prescription Date:  1/6/20  Last Fill Quantity: 90,  # refills: 0   Last office visit: 1/6/2020 with prescribing provider:  Davion Cordova PA-C    Future Office Visit:        There is no refill protocol information for this order

## 2020-02-01 NOTE — PROGRESS NOTES
SUBJECTIVE:   Jayro Elder is a 69 year old male presenting with a chief complaint of a six-day history of nasal congestion, thick dark yellow nasal discharge, headache (at the posterior head), dizziness (feeling off-balance lasting at least an hour without obvious triggers), cough productive of green (severe coughing attacks at nighttime), fatigue.  .  .  Onset of symptoms was five days ago.    Severity   Current and Associated symptoms: as listed above.   Treatment measures tried include Robitussin with temporary relief.    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     Cardiomyopathy (H)      Cellulitis of left lower extremity 3/13/2018     Cerebral infarction (H)     eye sight decreased in peripheral of right side and blurry     Diabetic ulcer of left midfoot associated with type 2 diabetes mellitus, with fat layer exposed (H) 3/13/2018     Essential hypertension, benign 2002     Generalized osteoarthrosis, unspecified site 2002     Microalbuminuria 3/13/2018    X1     Myocardial infarction (H)      Neuropathy      Sepsis (H)      Sleep apnea     doesn't use it     Substance abuse (H)      Syncope, unspecified syncope type 3/13/2018     Thyroid disease     takes medicine     Tobacco use disorder 2002     Type 2 diabetes mellitus with diabetic peripheral angiopathy without gangrene, unspecified long term insulin use status        Social History     Tobacco Use     Smoking status: Former Smoker     Packs/day: 1.00     Years: 25.00     Pack years: 25.00     Types: Cigarettes     Last attempt to quit: 2005     Years since quittin.5     Smokeless tobacco: Never Used   Substance Use Topics     Alcohol use: No     Alcohol/week: 4.0 standard drinks     Types: 4 Shots of liquor per week     Frequency: Never       ROS:  CONSTITUTIONAL:NEGATIVE for  fever, chills, change in weight  ENT/MOUTH: POSITIVE for nasal congestion  RESP:POSITIVE for cough  MUSCULOSKELETAL: NEGATIVE for new weakness.    NEURO: POSITIVE for an unsteady sensation.      OBJECTIVE:  /68   Pulse 77   Temp 97.9  F (36.6  C) (Tympanic)   Resp 18   Wt 113.4 kg (250 lb)   SpO2 97%   BMI 30.43 kg/m    GENERAL APPEARANCE: healthy, alert and no distress  EYES: Eyes grossly normal to inspection.  No obvious nystagmus.    HENT: cerumen bilateral, after the cerumen was successfully removed via irrigation, the TMs and canals were within normal limits.  Nasal turbinates are erythematous, swollen and oral mucous membranes moist, no erythema noted  NECK: supple, nontender, no lymphadenopathy  RESP: lungs clear to auscultation - no rales, rhonchi or wheezes  CV: irregularly irregular rhythm.  No murmurs/rubs/gallops.    NEURO: Normal strength and tone, sensory exam grossly normal, mentation intact, speech normal and gait normal  SKIN: no suspicious lesions or rashes    ASSESSMENT:  Acute Rhinitis  Cough  Dizziness    PLAN:  For the acute rhinitis:  Rx:  Doxycycline, Flonase  Humidifier, Steam  Saline nasal rinses or Neti Pot nasal rinses  follow up if not better in 10 days.     For the cough:  Rx:  Doxycycline, Tessalon Perles.   Humidifier, Steam  follow up if not better in 10 days.     Sav Hong MD

## 2020-02-01 NOTE — PATIENT INSTRUCTIONS
Humidifier, Steam     Saline nasal rinses or Neti Pot nasal rinses    follow up if not better in 10 days.

## 2020-02-03 RX ORDER — GABAPENTIN 100 MG/1
200 CAPSULE ORAL AT BEDTIME
Qty: 90 CAPSULE | Refills: 0
Start: 2020-02-03 | End: 2020-03-03

## 2020-02-03 NOTE — TELEPHONE ENCOUNTER
RX [place] ([place]) reviewed:     Last fill dates:  1/7/20, disp 90    [place]  [email or web address]      Routing refill request to provider for review/approval because:  Drug not on the [facility] refill protocol - seems like the pt should still have some as he didn't start at 3 a day

## 2020-02-03 NOTE — TELEPHONE ENCOUNTER
Called and discussed with the pt.  He said he is up to 200 mg.  Most nights he is able to sleep again.  He said he didn't request it from the pharmacy yet.

## 2020-02-12 NOTE — Clinical Note
"Subjective:       Patient ID: Krista Cameron is a 10 y.o. female.    Vitals:  height is 5' 1" (1.549 m) and weight is 69.4 kg (153 lb). Her temperature is 97.1 °F (36.2 °C). Her blood pressure is 120/77 (abnormal) and her pulse is 84. Her oxygen saturation is 98%.     Chief Complaint: Eye Problem and URI    Pt states she went to school and later in the day her right eye became red and itchy with a discharge ,her sister had pink eye this past sat ,   She also c/o sore throat with pain when she swallows, nasal congestion with post nasal drip and a dry cough x3 days      Eye Problem    The right eye is affected. This is a new problem. The current episode started today. The problem occurs every several days. The problem has been gradually worsening. There was no injury mechanism. There is known exposure to pink eye. She does not wear contacts. Associated symptoms include an eye discharge and eye redness. Pertinent negatives include no fever or vomiting.   URI   This is a new problem. The current episode started in the past 7 days. The problem occurs every several days. Associated symptoms include congestion, coughing and a sore throat. Pertinent negatives include no chills, fever, headaches, myalgias, rash or vomiting. Treatments tried: nyquil,  The treatment provided mild relief.       Constitution: Negative for appetite change, chills and fever.   HENT: Positive for congestion, postnasal drip, sore throat and trouble swallowing. Negative for ear pain.    Neck: Negative for painful lymph nodes.   Eyes: Positive for eye discharge, eye pain and eye redness.   Respiratory: Positive for cough.    Gastrointestinal: Negative for vomiting and diarrhea.   Genitourinary: Negative for dysuria.   Musculoskeletal: Negative for muscle ache.   Skin: Negative for rash.   Neurological: Negative for headaches and seizures.   Hematologic/Lymphatic: Negative for swollen lymph nodes.       Objective:      Physical Exam " Catheter removed over wire.     Constitutional: She appears well-developed and well-nourished. She is active and cooperative.  Non-toxic appearance. She does not have a sickly appearance. She does not appear ill. No distress.   Patient sitting comfortably on the exam table, non toxic appearance  and answering questions appropriately, no acute distress     HENT:   Head: Normocephalic and atraumatic. No signs of injury. There is normal jaw occlusion.   Right Ear: Tympanic membrane, external ear, pinna and canal normal. No middle ear effusion.   Left Ear: Tympanic membrane, external ear, pinna and canal normal.  No middle ear effusion.   Nose: Nose normal. No nasal discharge. No signs of injury. No epistaxis in the right nostril. No epistaxis in the left nostril.   Mouth/Throat: Mucous membranes are moist. Oropharynx is clear.   Clear PND   Eyes: Visual tracking is normal. Conjunctivae, EOM and lids are normal. Right eye exhibits discharge (injected). Right eye exhibits no exudate. Left eye exhibits no discharge and no exudate. No scleral icterus.   Neck: Trachea normal and normal range of motion. Neck supple. No neck rigidity or neck adenopathy. No tenderness is present.   Cardiovascular: Normal rate and regular rhythm. Pulses are strong.   Pulmonary/Chest: Effort normal and breath sounds normal. No respiratory distress. She has no wheezes. She exhibits no retraction.   Abdominal: Soft. Bowel sounds are normal. She exhibits no distension. There is no tenderness.   Musculoskeletal: Normal range of motion. She exhibits no tenderness, deformity or signs of injury.   Lymphadenopathy: No anterior cervical adenopathy.   Neurological: She is alert. She has normal strength.   Skin: Skin is warm, dry, not diaphoretic and no rash. Capillary refill takes less than 2 seconds. abrasion, burn and bruising  Psychiatric: She has a normal mood and affect. Her speech is normal and behavior is normal. Cognition and memory are normal.   Nursing note and vitals  reviewed.    Results for orders placed or performed in visit on 02/12/20   POCT Strep A, Molecular   Result Value Ref Range    Molecular Strep A, POC Negative Negative     Acceptable Yes      Discussed results/diagnosis/plan with patient in clinic. Strict precautions given to parent to monitor for worsening signs and symptoms and Please go to the Emergency Department for any concerns or worsening of condition. Instructed to follow up with pediatrician.      Assessment:       1. Mucopurulent conjunctivitis of right eye    2. Sore throat    3. Viral upper respiratory illness        Plan:         Mucopurulent conjunctivitis of right eye  -     ofloxacin (OCUFLOX) 0.3 % ophthalmic solution; Place 1 drop into the right eye 4 (four) times daily. for 7 days  Dispense: 10 mL; Refill: 0    Sore throat  -     POCT Strep A, Molecular    Viral upper respiratory illness      Patient Instructions   General Discharge Instructions for Children   If your child was prescribed antibiotics, please take them to completion.  You must understand that you've received an Urgent Care treatment only and that you may be released before all your medical problems are known or treated. You, the parent  will arrange for follow up care as instructed.  Follow up with your child's pediatrician as directed in the next 1-2 days if not improved or as needed.  If your condition worsens we recommend that you receive another evaluation at the emergency room immediately or contact your pediatrician clinics after hours call service to discuss your concerns.  Please go to the Emergency Department for any concerns or worsening of condition.  ¿Qué es la conjuntivitis?    La conjuntivitis es christina irritación o infección de la la membrana que cubre el london del perico y el interior del párpado (conjuntiva). Puede producirse en brendon o los dos ojos. Esta membrana se hincha y mame vasos sanguíneos se dilatan haciendo que el perico se enrojezca, dando a la  conjuntivitis los apodos de perico caputo u perico willoughby.  ¿Cuáles son los síntomas?  Si usted tiene brendon o más de estos síntomas consulte con varghese oftalmólogo:  · Enrojecimiento en y alrededor de los ojos  · Ojos abotagados y adoloridos  · Picazón, ardor o punzadas en los ojos  · Lagrimeo o secreciones de los ojos  · Despertarse en la mañana con costras en las pestañas o con las pestañas pegadas entre sí  El tratamiento oportuno ayuda a prevenir que los ojos sufran mayores daños.  ¿Cómo se diagnostica?  Aunque la conjuntivitis no suele ser grave, algunas enfermedades más serias de los ojos tienen síntomas similares; así es que es importante que lorenza a varghese oftalmólogo para que le tisha un diagnóstico. Kelly le preguntará sobre mame síntomas, los medicamentos que usted fernanda y cualquier enfermedad o problema de keturah que haya tenido. También le revisará los ojos con christina cuco de mano y un microscopio especial llamado lámpara de hendidura.  Date Last Reviewed: 6/11/2015  © 6456-2707 [employer]. [address] [place] [id number]. Todos los derechos reservados. Esta información no pretende sustituir la atención médica profesional. Sólo varghese médico puede diagnosticar y tratar un problema de keturah.

## 2020-02-15 DIAGNOSIS — E11.21 TYPE 2 DIABETES MELLITUS WITH DIABETIC NEPHROPATHY, WITH LONG-TERM CURRENT USE OF INSULIN (H): ICD-10-CM

## 2020-02-15 DIAGNOSIS — Z79.4 TYPE 2 DIABETES MELLITUS WITH DIABETIC NEPHROPATHY, WITH LONG-TERM CURRENT USE OF INSULIN (H): ICD-10-CM

## 2020-02-15 DIAGNOSIS — G25.81 RESTLESS LEGS SYNDROME (RLS): ICD-10-CM

## 2020-02-17 RX ORDER — GABAPENTIN 100 MG/1
CAPSULE ORAL
Qty: 90 CAPSULE | Refills: 0 | OUTPATIENT
Start: 2020-02-17

## 2020-02-17 NOTE — TELEPHONE ENCOUNTER
Requested Prescriptions   Pending Prescriptions Disp Refills     gabapentin (NEURONTIN) 100 MG capsule [Pharmacy Med Name: GABAPENTIN 100 MG CAPSULE] 90 capsule 0     Sig: TAKE 1 CAPS BY MOUTH AT BEDTIME (2 HRS PRIOR TO BEDTIME) INCREASE BY 1 CAP EVERY WEEK UNTIL AT 300MG   Last Written Prescription Date:  2/3/20  Last Fill Quantity: 90,  # refills: 0   Last office visit: 1/6/2020 with prescribing provider:  Davion Cordova PA-C    Future Office Visit:        There is no refill protocol information for this order

## 2020-03-03 DIAGNOSIS — Z79.4 TYPE 2 DIABETES MELLITUS WITH DIABETIC NEPHROPATHY, WITH LONG-TERM CURRENT USE OF INSULIN (H): ICD-10-CM

## 2020-03-03 DIAGNOSIS — E11.21 TYPE 2 DIABETES MELLITUS WITH DIABETIC NEPHROPATHY, WITH LONG-TERM CURRENT USE OF INSULIN (H): ICD-10-CM

## 2020-03-03 DIAGNOSIS — G25.81 RESTLESS LEGS SYNDROME (RLS): ICD-10-CM

## 2020-03-03 RX ORDER — GABAPENTIN 100 MG/1
200 CAPSULE ORAL AT BEDTIME
Qty: 180 CAPSULE | Refills: 0 | Status: SHIPPED | OUTPATIENT
Start: 2020-03-03 | End: 2020-05-27

## 2020-03-09 ENCOUNTER — MEDICAL CORRESPONDENCE (OUTPATIENT)
Dept: HEALTH INFORMATION MANAGEMENT | Facility: CLINIC | Age: 70
End: 2020-03-09

## 2020-03-13 ENCOUNTER — CARE COORDINATION (OUTPATIENT)
Dept: CARDIOLOGY | Facility: CLINIC | Age: 70
End: 2020-03-13

## 2020-03-13 NOTE — PROGRESS NOTES
Received VM from  PT stating she saw Jayro today and he is having orthostatic hypotension with c/o dizziness.  She reports he's had a recent stroke.  Pt is wondering if he should see Gill Doty sooner than 3/20/20.     Supine: 108/60  Sittin/56  Standin/48    Future Appointments   Date Time Provider Department Center   3/20/2020 10:00 AM  LAB [facility] PSA CLIN   3/20/2020 10:50 AM Gill Doty PA [facility] PSA CLIN   2020  1:00 PM Sarah Bolivar MD [facility] Sara Fairchild, RN BSN  [place], [place]  C.O.R.E. Clinic Care Coordinator  20, 5:54 PM

## 2020-03-13 NOTE — PROGRESS NOTES
Called and spoke with Jayro.  Jayro reports Inocencia his PT saw him yesterday.    Jayro's recent BP from today: 95/70.     Today: not as dizzy as yesterday.  Standing up in one place gets a little woozy.   Reports wt is usually 245# which is stable for him.      Instructed Jayro to check his BP tonight before taking his PM coreg dose.  If SBP less than 90/ hold coreg tonight.    Tomorrow instructed him to also check his BP prior to AM meds and hold lisinopril, Coreg and Isosorbide if SBP  < 90 and to call the after hours number for further instructions.  Also, if dizziness gets worse, instructed him to call after hours number.     Gave Jayro the after hours number: [phone]    Will follow up on Monday to see how he's feeling, how BP's are and if we still need to move appt up further.  He's seeing Gill on Fri 3/20.     Future Appointments   Date Time Provider Department Center   3/20/2020 10:00 AM RU LAB [facility] CLIN   3/20/2020 10:50 AM Gill Doty PA [facility] PSA CLIN   5/7/2020  1:00 PM Sarah Bolivar MD [facility] Sara Fairchild, RN BSN  [facility] [place], [place]  C.O.R.E. Clinic Care Coordinator  03/13/20, 6:05 PM

## 2020-03-16 ENCOUNTER — NURSE TRIAGE (OUTPATIENT)
Dept: NURSING | Facility: CLINIC | Age: 70
End: 2020-03-16

## 2020-03-16 DIAGNOSIS — I48.20 CHRONIC ATRIAL FIBRILLATION (H): ICD-10-CM

## 2020-03-16 DIAGNOSIS — I50.23 ACUTE ON CHRONIC SYSTOLIC CONGESTIVE HEART FAILURE (H): ICD-10-CM

## 2020-03-16 DIAGNOSIS — I25.119 CORONARY ARTERY DISEASE INVOLVING NATIVE CORONARY ARTERY OF NATIVE HEART WITH ANGINA PECTORIS (H): ICD-10-CM

## 2020-03-16 RX ORDER — LISINOPRIL 40 MG/1
40 TABLET ORAL DAILY
Qty: 90 TABLET | Refills: 0 | Status: SHIPPED | OUTPATIENT
Start: 2020-03-16 | End: 2020-03-26

## 2020-03-16 RX ORDER — ISOSORBIDE MONONITRATE 60 MG/1
60 TABLET, EXTENDED RELEASE ORAL DAILY
Qty: 90 TABLET | Refills: 0 | Status: SHIPPED | OUTPATIENT
Start: 2020-03-16 | End: 2020-04-13

## 2020-03-16 NOTE — PROGRESS NOTES
Spoke to pt. He is not feeling much different. SBPs 92-93/68-70. Dizziness and woozy feeling continue. He has not held his AM meds as SBP was >90  Wt today is 247 lbs. He did say he is feeling chilled and has a productive cough of yellow sputum and. Completed Wellness Screening. Also gave pt the number to the [address] to continue with screening; [phone]      Wellness Screening Tool    Symptom Screening:    Do you have a:    Fever? Unsure as he does not have a thermometer. He did reports chills, more so after dark.     Cough?  Yes, yellow sputum    Shortness of breath?  No (if yes, is this a change?)    Skin rash?  No      Within the past 14 days, have you been in contact with someone who:    Is currently being ruled out for COVID-19 (novel coronavirus)?  No    Has tested positive for COVID-19? No    Has symptoms of a respiratory illness (fever, cough, shortness of breath)? Yes    Have you or someone you have been in contact with traveled to an area with COVID-19:  Refer to the CDC Coronavirus webpage for COVID-19 areas:He was in [place] and [facility] last month.    Within the past 3 weeks, have you been exposed to the following:      Pertussis?  No    Chicken pox?  No    Measles? No    Patient's appointment status:  Will review with Gill. Appt 3/20. Pt instructed to call [address]    Princess Chapa RN 12:43 PM 03/16/20    Addendum:     Patient's appointment status: appointment converted to phone visit     Future Appointments   Date Time Provider Department Center   3/20/2020 10:00 AM RU LAB [facility] PSA CLIN   3/20/2020 10:50 AM Gill Doty PA [facility] PSA CLIN   5/7/2020  1:00 PM Sarah Bolivar MD [facility]       Princess Chapa RN 12:25 PM 03/17/20

## 2020-03-16 NOTE — TELEPHONE ENCOUNTER
Pt called in states they told him to do screened by triage.  Pt has runny nose.  No corono virus exposure Pt knows.  Pt was at in [place] 18 days ago and come back in 28 of feb 2020.  No fever, occasionally has productive cough, no difficulty breathing.  Pt has chronic heart disease.  Has chills.  The disposition is home care.  Care advice given per protocol.  Patient agrees with care advice given.   Pt states he will do [email or web address] visit.  Agreed to call back if he has additional symptoms or questions.      Stephane Wagner [place] Nurse Advisor 3/16/2020 3:11 PM        Reason for Disposition    [1] No CORONAVIRUS EXPOSURE BUT [2] questions about    Additional Information    Negative: Severe difficulty breathing (e.g., struggling for each breath, speak in single words, bluish lips)    Negative: Sounds like a life-threatening emergency to the triager    Negative: [1] Difficulty breathing (shortness of breath) occurs AND [2] onset > 14 days after CORONAVIRUS EXPOSURE (Close Contact)    Negative: [1] Dry cough occurs AND [2] onset > 14 days after CORONAVIRUS EXPOSURE    Negative: [1] Wet cough (i.e., white-yellow, yellow, green, or markos colored sputum) AND [2] onset > 14 days after CORONAVIRUS EXPOSURE    Negative: [1] Common cold symptoms AND [2] onset > 14 days after CORONAVIRUS EXPOSURE    Negative: [1] Difficulty breathing occurs AND [2] within 14 days of CORONAVIRUS EXPOSURE    Negative: Patient sounds very sick or weak to the triager    Negative: [1] Fever or feeling feverish AND [2] within 14 Days of CORONAVIRUS EXPOSURE    Negative: [1] Cough occurs AND [2] within 14 days of CORONAVIRUS EXPOSURE    Negative: [1] Fever or feeling feverish AND [2] symptoms of lower respiratory illness (e.g., cough, difficulty breathing) AND [3] TRAVEL FROM [place] within last 14 days    Negative: [1] Body aches, chills, diarrhea, headache, runny nose, or sore throat occur AND [2] within 14 days of CORONAVIRUS EXPOSURE    Negative:  [1] CORONAVIRUS EXPOSURE 15 or more days ago AND [2] NO cough or fever or breathing difficulty    Negative: [1] CORONAVIRUS EXPOSURE within last 14 days AND [2] NO cough, fever, or breathing difficulty    Negative: [1] TRAVEL FROM [place] in last 14 days AND  [2] NO cough or fever or breathing difficulty    Protocols used: CORONAVIRUS (2019-NCOV) EXPOSURE-A-AH

## 2020-03-17 NOTE — PROGRESS NOTES
Pt is in agreement for phone visit. Gave him instructions to have his wt, BP, HR and medications ready to be reviewed. Expressed understanding.   Princess Chapa RN 12:23 PM 03/17/20

## 2020-03-17 NOTE — PROGRESS NOTES
Gill Doty PA Gerdowsky, Christina, RN    Caller: Unspecified (4 days ago,  5:46 PM)               Agree with Covid screening from the call center.   Let's make him a phone visit.   Looks like he has the ability to take his BP and weight at home so make sure we have that info just prior to the visit.     Jonathon Garland

## 2020-03-18 ENCOUNTER — CARE COORDINATION (OUTPATIENT)
Dept: CARDIOLOGY | Facility: CLINIC | Age: 70
End: 2020-03-18

## 2020-03-18 NOTE — PROGRESS NOTES
Spoke to pt. He is not feeling much different. SBPs 92-93/68-70. Dizziness and woozy feeling continue. He has not held his AM meds as SBP was >90  Wt today is 247 lbs. He did say he is feeling chilled and has a productive cough of yellow sputum and. Completed Wellness Screening. Also gave pt the number to the [address] to continue with screening; [phone]          Wellness Screening Tool     Symptom Screening:     Do you have a:    Fever? Unsure as he does not have a thermometer. He did reports chills, more so after dark.     Cough?  Yes, yellow sputum    Shortness of breath?  No (if yes, is this a change?)    Skin rash?  No       Within the past 14 days, have you been in contact with someone who:    Is currently being ruled out for COVID-19 (novel coronavirus)?  No    Has tested positive for COVID-19? No    Has symptoms of a respiratory illness (fever, cough, shortness of breath)? Yes     Have you or someone you have been in contact with traveled to an area with COVID-19:  Refer to the CDC Coronavirus webpage for COVID-19 areas:He was in [place] and [facility] last month.     Within the past 3 weeks, have you been exposed to the following:       Pertussis?  No    Chicken pox?  No    Measles? No     Patient's appointment status:  Will review with Gill. Appt 3/20. Pt instructed to call [address]     Princess Chapa RN 12:43 PM 03/16/20     Addendum:      Patient's appointment status: appointment converted to phone visit             Future Appointments   Date Time Provider Department Center   3/20/2020 10:00 AM RU LAB [facility] PSA CLIN   3/20/2020 10:50 AM Gill Doty PA [facility] PSA CLIN   5/7/2020  1:00 PM Sarah Bolivar MD [facility]         Princess Chapa RN 12:25 PM 03/17/20

## 2020-03-18 NOTE — PROGRESS NOTES
Princess Chapa, RN     Registered Nurse                     Pt is in agreement for phone visit. Gave him instructions to have his wt, BP, HR and medications ready to be reviewed. Expressed understanding.   Princess Chapa RN 12:23 PM 03/17/20           Princess Chapa, RN     Registered Nurse                     Gill Doty PA Gerdowsky, Christina, RN     Caller: Unspecified (4 days ago, 5:46 PM)             Agree with Covid screening from the call center.   Let's make him a phone visit.   Looks like he has the ability to take his BP and weight at home so make sure we have that info just prior to the visit.     Thx   Princess Bucio, RN    Registered Nurse                Spoke to pt. He is not feeling much different. SBPs 92-93/68-70. Dizziness and woozy feeling continue. He has not helped his AM meds as SBP was >90   Wt today is 247 lbs. He did say he is feeling chilled and has a productive cough of yellow sputum and. Completed Wellness Screening. Also gave pt the number to the [facility] to continue with screening; [phone]   Wellness Screening Tool   Symptom Screening:   Do you have a:   Fever? Unsure as he does not have a thermometer. He did reports chills, more so after dark.   Cough? Yes, yellow sputum   Shortness of breath? No (if yes, is this a change?)   Skin rash? No   Within the past 14 days, have you been in contact with someone who:   Is currently being ruled out for COVID-19 (novel coronavirus)? No   Has tested positive for COVID-19? No   Has symptoms of a respiratory illness (fever, cough, shortness of breath)? Yes  Have you or someone you have been in contact with traveled to an area with COVID-19:   Refer to the CDC Coronavirus webpage for COVID-19 areas:He was in [place] and [facility] last month.   Within the past 3 weeks, have you been exposed to the following:   Pertussis? No   Chicken pox? No   Measles? No  Patient's appointment status:  Will review with Gill. Appt 3/20. Pt instructed to call COVID-19 [facility]   Princess Chapa RN 12:43 PM 03/16/20   Addendum:   Patient's appointment status: appointment converted to phone visit           Future Appointments   Date Time Provider Department Center   3/20/2020 10:00 AM RU LAB [facility] PSA CLIN   3/20/2020 10:50 AM Gill Doty PA [facility] CL[facility]   5/7/2020 1[facility]00 [facility]   Princess Chapa, RN 12:25 PM 03/17/20

## 2020-03-18 NOTE — PROGRESS NOTES
Gill Doty PA Gerdowsky, Christina, RN    Caller: Unspecified (4 days ago,  5:46 PM)                Agree with Covid screening from the call center.   Let's make him a phone visit.   Looks like he has the ability to take his BP and weight at home so make sure we have that info just prior to the visit.     Jonathon Garlnad

## 2020-03-20 ENCOUNTER — TELEPHONE (OUTPATIENT)
Dept: CARDIOLOGY | Facility: CLINIC | Age: 70
End: 2020-03-20

## 2020-03-20 ENCOUNTER — VIRTUAL VISIT (OUTPATIENT)
Dept: CARDIOLOGY | Facility: CLINIC | Age: 70
End: 2020-03-20
Attending: INTERNAL MEDICINE
Payer: COMMERCIAL

## 2020-03-20 VITALS
HEART RATE: 73 BPM | WEIGHT: 247 LBS | BODY MASS INDEX: 30.07 KG/M2 | SYSTOLIC BLOOD PRESSURE: 93 MMHG | DIASTOLIC BLOOD PRESSURE: 71 MMHG

## 2020-03-20 DIAGNOSIS — I50.22 CHRONIC SYSTOLIC CONGESTIVE HEART FAILURE (H): Primary | ICD-10-CM

## 2020-03-20 DIAGNOSIS — I25.119 CORONARY ARTERY DISEASE INVOLVING NATIVE CORONARY ARTERY OF NATIVE HEART WITH ANGINA PECTORIS (H): ICD-10-CM

## 2020-03-20 DIAGNOSIS — I63.432 CEREBROVASCULAR ACCIDENT (CVA) DUE TO EMBOLISM OF LEFT POSTERIOR CEREBRAL ARTERY (H): ICD-10-CM

## 2020-03-20 DIAGNOSIS — I50.23 ACUTE ON CHRONIC SYSTOLIC CONGESTIVE HEART FAILURE (H): ICD-10-CM

## 2020-03-20 DIAGNOSIS — I10 ESSENTIAL HYPERTENSION: ICD-10-CM

## 2020-03-20 DIAGNOSIS — I10 HYPERTENSION GOAL BP (BLOOD PRESSURE) < 140/90: ICD-10-CM

## 2020-03-20 DIAGNOSIS — I50.22 CHRONIC SYSTOLIC CONGESTIVE HEART FAILURE (H): ICD-10-CM

## 2020-03-20 PROCEDURE — 99213 OFFICE O/P EST LOW 20 MIN: CPT | Mod: TEL | Performed by: PHYSICIAN ASSISTANT

## 2020-03-20 RX ORDER — CARVEDILOL 25 MG/1
25 TABLET ORAL 2 TIMES DAILY WITH MEALS
Qty: 180 TABLET | Refills: 0 | Status: SHIPPED | OUTPATIENT
Start: 2020-03-20 | End: 2020-03-26

## 2020-03-20 RX ORDER — ATORVASTATIN CALCIUM 40 MG/1
40 TABLET, FILM COATED ORAL EVERY EVENING
Qty: 90 TABLET | Refills: 0 | Status: SHIPPED | OUTPATIENT
Start: 2020-03-20 | End: 2020-06-16

## 2020-03-20 NOTE — PROGRESS NOTES
"[facility] TELEPHONE VISIT    Jayro Elder is a 69 year old male who is being evaluated via a billable telephone visit.      The patient has been notified of following:     \"This telephone visit will be conducted via a call between you and your physician/provider. Given concern for spread of COVID 19 we are minimizing in person clinic visits when possible. We have found that certain health care needs can be provided without the need for a physical exam.  This service lets us provide the care you need with a short phone conversation.  If a prescription is necessary we can send it directly to your pharmacy.  If lab work is needed we can place an order for that and you can then stop by our lab to have the test done at a later time. If during the course of the call the physician/provider feels a telephone visit is not appropriate, you will not be charged for this service.\"       I have reviewed and updated the patient's Past Medical History, Social History, Family History and Medication List.    MEDICATIONS:  Current Outpatient Medications   Medication Sig Dispense Refill     apixaban ANTICOAGULANT (ELIQUIS) 5 MG tablet Take 1 tablet (5 mg) by mouth 2 times daily       ASPIRIN NOT PRESCRIBED (INTENTIONAL) Antiplatelet medication not prescribed intentionally       atorvastatin (LIPITOR) 40 MG tablet Take 1 tablet (40 mg) by mouth every evening 90 tablet 0     benzonatate (TESSALON) 100 MG capsule Take 2 capsules (200 mg) by mouth 3 times daily as needed for cough 42 capsule 3     carvedilol (COREG) 25 MG tablet Take 1 tablet (25 mg) by mouth 2 times daily (with meals) 180 tablet 0     Cholecalciferol (VITAMIN D3) 2000 units TABS Take 1 tablet by mouth daily       clopidogrel (PLAVIX) 75 MG tablet Take 1 tablet (75 mg) by mouth daily 90 tablet 3     fluticasone (FLONASE) 50 MCG/ACT nasal spray Spray 2 sprays into both nostrils daily 18.2 mL 1     furosemide (LASIX) 40 MG tablet Take 1 tablet (40 mg) by mouth daily 90 " tablet 3     gabapentin (NEURONTIN) 100 MG capsule Take 2 capsules (200 mg) by mouth At Bedtime 180 capsule 0     insulin aspart (NOVOLOG FLEXPEN) 100 UNIT/ML pen Inject 8 Units Subcutaneous daily (with lunch)       insulin aspart (NOVOLOG FLEXPEN) 100 UNIT/ML pen Inject 10 Units Subcutaneous daily (with dinner)       insulin aspart (NOVOLOG FLEXPEN) 100 UNIT/ML pen Inject Subcutaneous 3 times daily (with meals) Sliding Scale  Do not give sliding scale insulin if Pre-Meal BG less than 140.   For Pre-Meal:   - 200 give 2 units.    - 250 give 4 units.    - 300 give 6 units.    - 350 give 8 units.    - 400 give 10 units.       insulin aspart (NOVOLOG PEN) 100 UNIT/ML pen Inject 8 Units Subcutaneous daily (with breakfast)        insulin glargine (LANTUS SOLOSTAR PEN) 100 UNIT/ML pen Inject 36 Units Subcutaneous every morning       insulin pen needle 31G X 6 MM Use  as directed. 100 each prn     isosorbide mononitrate (IMDUR) 60 MG 24 hr tablet Take 1 tablet (60 mg) by mouth daily 90 tablet 0     levothyroxine (SYNTHROID, LEVOTHROID) 137 MCG tablet Take 137 mcg by mouth daily  90 tablet 3     lisinopril (ZESTRIL) 40 MG tablet Take 1 tablet (40 mg) by mouth daily 90 tablet 0     nitroglycerin (NITROSTAT) 0.4 MG sublingual tablet Place 1 tablet (0.4 mg) under the tongue every 5 minutes as needed for chest pain 50 tablet 0     NONFORMULARY Topical patch for diabetes (blood glucose control) - changes every 4 days or so       order for DME Equipment being ordered: Walker Wheels () and Walker ()  Treatment Diagnosis: Impaired gait, heel WB bilaterally. 1 each 0     pantoprazole (PROTONIX) 40 MG enteric coated tablet Take 1 tablet (40 mg) by mouth every morning (before breakfast) 30 tablet 0     spironolactone (ALDACTONE) 25 MG tablet Take 1 tablet (25 mg) by mouth daily 90 tablet 3       ALLERGIES  No known allergies    Review Of Systems  Skin: negative  Eyes: glasses, blurry vision,  right peripheral vision compromised d/t CVA. Photophobia.   Ears/Nose/Throat: negative  Respiratory: Intermittent ANDREWS, unaware of cause  Cardiovascular: Fatigue, chronic lightheadedness (stable)   Gastrointestinal: Intermittent nausea w/lightheadedness  Genitourinary: negative  Musculoskeletal: chronic back pain, neck pain   Neurologic: positive for leg heaviness and tingling. Frequent headaches  Psychiatric: Negative.   Hematologic/Lymphatic/Immunologic: negative  Endocrine: diabetes, thyroid disorder  (ROS TAKEN BY Henrry MICHAEL LPN  PRIOR TO VISIT)    HPI:  Mr. Elder is a 68 year old male with a PMHx including DM2, hypothyroidism, hypertension, CVA, untreated MARISOL, and renal dysfunction. He also has chronic afib (on AC), and known coronary disease with prior MI/stenting and resultant ischemic CMO with chronic EF of 35-40%. He also struggles with peripheral wounds from his diabetes. In Jan 2019 he was admitted for osteomyelitis and his stay was complicated by ABNER with IV antibiotics and his long term clopidogrel was stopped at that time. He presented back shortly after, with weight gain and ANDREWS. He was again hospitalized and treated for a CHF exacerbation, and was diuresed ~30 pounds. Echo showed no new WMAs and his EF remained in the 35-40% range. He was again hospitalized in March 2019 for right foot cellulitis and right toe osteomyelitis. He got IV abx and underwent partial toe amputation as well as I+D of his left foot ulcer. During that stay, he did not have an exacerbation of his heart failure, and his atrial fibrillation remained under good control.      In early July 2019 he was admitted again for evaluation of chest pain. He underwent SUSAN x 3 to the RCA. Echo showed EF remained 35-40% without significant changes. He returned a few weeks later with atypical chest pain, and medical management was recommended as repeat stress test did not show any new ischemia. I have been following him periodically in [facility], but we had been spacing out visits at his request due to to finances/transportation. At our last visit in Nov 2019 he was at his baseline and I made no changes at that time.    Today he was to return for a formal [facility] visit. However, given concern of exposure to COVID-19, we transitioned this to a phone visit today. The last several weeks he has had some issues with lower BP at home and some orthostasis symptoms. He has chronic dizziness but upon questioning, it appears this may be slightly worse, though denies presyncope/syncope. He denies any new GI symptoms, or blood in his stool. He has not had any recent chest pain, but says he sometimes gets tightness. He does not seem concerned and says it can happen anytime whether resting or not. He does not feel that this is a change from a few months ago. He denies ANDREWS and is doing home PT with some light exercise. He does not routinely get the chest tightness while doing the exercise. He tells me he is compliant with his current medications, though I do not have any recent labs since last fall.      Reported home BP 93/71 sitting, 80/65 standing. Home heart rates in the 70s.       Assessment/Plan:    1.  Chronic chronic systolic heart failure, ischemic cardiomyopathy.              --Known chronic EF 35-40%, which remained unchanged per last echo July 2019 when he returned with angina, requiring repeat intervention.               --He is having hypotension along with orthostatic symptoms at times. I asked him to stop the spironolactone today. On days his SBP is <90, I will have him hold his lisinopril as well. For now, will try to continue his carvedilol if we can, as below. Will check a BMP/NT-proBNP, and hemoglobin, hopefully next week.   --It sounds like his weight is stable and he has no edema. Thus, continued Lasix 40mg daily. We may need to revisit this pending his renal function on labs.               2. Chronic atrial fibrillation.              --Appears  "chronically rate controlled, says home heart rates are in the 70s. He is occasionally symptomatic with \"flutters\" though has declined a Ziopatch in the past. Will try to continue carvedilol at 25mg BID if we can.               --Continue Eliquis, 5mg BID. He denies any new bleeding issues.      3. Coronary artery disease.              --Long cardiac hx including MI in 2005 with proximal LAD stenting, repeat LAD stenting in 2009 and distal LAD stenting in 2013. In June 2017 he had stent placement to an obtuse marginal. Most recently, in July 2019, he was found to have progression of his disease and underwent SUSAN x 3 to the RCA. He is free of chest pain currently. He is chronically on Imdur.              --Continue Plavix without change, along with his Eliquis as above. Planning repeat hemoglobin as above.              --Continue atorvastatin 40mg at bedtime. Last LDL under excellent control at 43, July 2019. Plan routine repeat fasting labs this summer.              Follow up plan:  Will plan another phone virtual visit with myself in ~2 weeks, and tentative in person office visit in 3 months with Dr. Tomlin.      I have reviewed the note as documented above.  This accurately captures the substance of my conversation with the patient.      Phone call contact time  Call Started at 1120  Call Ended at 1131    Gill Doty PA-C  [facility]  Pager [phone]    "

## 2020-03-20 NOTE — TELEPHONE ENCOUNTER
Called Inocencia [facility] PT, to let her know we added HGB and BNP as well to BMP.   Left detailed VM.   Princess Chapa RN 1:23 PM 03/20/20

## 2020-03-20 NOTE — TELEPHONE ENCOUNTER
Gill Doty PA Gerdowsky, Christina, RN    Caller: Unspecified (Today, 12:00 PM)               Please get a BNP and hemoglobin too.   I put in orders for all 3 labs already.

## 2020-03-20 NOTE — TELEPHONE ENCOUNTER
Spoke to Inocencia, [facility] PT. Pt has declined SN services. Gave Inocencia a verbal order over phone for a BMP for next wk for Wed, Thurs or Friday - whenever she can get some one to his home to draw lab.     Pt states she has been working with Jayro for his dizziness and specializes in vestibular issues, which she has not found any.     Did review med change with her and that pt is to hold his lisinopril if SBP <90.     Ortho BP from 3/19 was:   Sitting   110/74 and standing 90/70. Pt did c/o dizziness    Will update Gill and will follow up with pt on Monday.   Princess Chapa, RN 12:14 PM 03/20/20

## 2020-03-20 NOTE — TELEPHONE ENCOUNTER
----- Message from CONCEPCION Dasilva sent at 3/20/2020 11:38 AM CDT -----  Regarding: PLAN  Due to hypotension, I told him to STOP his spironolactone for now.    I'd like you to call him in 1 week for a BP update, but do a formal PHONE visit with me again in 2 weeks.  See if we can get him home care people to do a BMP, BNP, and hemoglobin the middle to end of next week please.     Thanks  Gill Dasilva PA Gerdowsky, Christina, RN               In addition to plans I previously sent, will tell him that if his AM BP is <90 systolic he should also hold lisinopril those days. (he should stay off the aldactone altogether unless directed by us).     When you get a phone update next week be sure to find out how often he is having to hold the lisinopril with his BP update.     Jonathon Garland

## 2020-03-23 NOTE — TELEPHONE ENCOUNTER
"Pt states he \"feels off\". He did stop taking spironolactone per instructions on 3/20, last does was 3/19.   He did not hold his lisinopril over the wkend as his SBP was not <90.     BPs:   3/22 at 1115  sititing 88/62, HR 72   (after meds)                     Standing  71/58, HR 80                     ** pt was dizzy with position change         At 1145  Sittin/63, HR 79                        **Was too dizzy when he stood up         At 1220   Standin/79, HR 74         At 1345:  Sittin/68. HR 79                        Standin/72, HR 72         At 1530  Sittin/71, HR 79                        Standing 88/65, HR 75         At 1645   Sittin/75, 73                        Standin/74, HR 83                        **Felt \"off\", decreased strength, Dizziness and occ \"flutter\" in chest. Denies near syncope, diaphoresis, nausea, CP or SOB.     3/23  BEFORE meds: Sitting 99/76, HR 76                                       Standing 96/81, HR 58    **Pt reports he feels weak, \"off\", dizziness with standing and occ flutter in chest. He also reports numbness to whole face, mostly his forehead, soreness to back of neck and head that is worse when he wakes up. THIS IS NOT NEW.     Will review with Gill. He has NOT taken any meds this morning.   Message sent to scheduling to set up phone visit in 2 wks.   Princess Chapa RN 9:41 AM 20         "

## 2020-03-23 NOTE — TELEPHONE ENCOUNTER
Please have him continue to hold spironolactone, and he should now hold his lisinopril as well until directed to resume.  Have him hold AM dose of Coreg this morning too.  Take BP this afternoon, if SBP >110mmHg then he can take Coreg but only HALF his usual dose.     I'm trying my best to keep him out of the ER given the current COVID situation.    See if we can get his home care team to draw labs today (or earliest possible). I think these results may alter our decisions now. Ensure he hasn't had any blood in his urine or stool, or signs of bleeding anywhere.     If the numbness in his face gets worse, or any unilateral arm/leg weakness (review signs of stroke with him) then he needs to seek emergent care.    Thanks  Gill

## 2020-03-23 NOTE — TELEPHONE ENCOUNTER
Returned Vandana's call ([facility] RN) to request HGB, BMP & BNP draw. She will send a RN there today if available but will be drawn tomorrow for sure.   Update to Gill.   Princess Chapa RN 11:15 AM 03/23/20

## 2020-03-23 NOTE — TELEPHONE ENCOUNTER
"Spoke to pt and reviewed instructions with him:   Pt will continue to hold spironolactone, hold lisinopril, hold AM dose of coreg. This afternoon if SBP >110, pt will take half of his coreg dose (half of his 25 mg tablet for 12.5 mg dose).   Also spoke to Inocencia, PT from [facility], reviewed med changes with her. She will write them down for pt and pt repeated back correctly. Inocencia did e-mail HC RNs so labs will be drawn. Orders in Epic and asked Inocencia to have the grab the orders under Gill's name: HGB, BMP & BNP.   Inocencia did orthos on pt:   Sitting 118/80, Standing 110/76. Pt remains feeling \"off\" but little better. This is with NO MEDS.     Reviewed s/s of CVA with pt.   Will follow up on pt tomorrow to see how he is feeling.   Princess Chapa RN 10:31 AM 03/23/20      "

## 2020-03-24 LAB
ANION GAP SERPL CALCULATED.3IONS-SCNC: 4 MMOL/L (ref 3–14)
BUN SERPL-MCNC: 25 MG/DL (ref 7–30)
CALCIUM SERPL-MCNC: 8.7 MG/DL (ref 8.5–10.1)
CHLORIDE SERPL-SCNC: 104 MMOL/L (ref 94–109)
CO2 SERPL-SCNC: 26 MMOL/L (ref 20–32)
CREAT SERPL-MCNC: 1.14 MG/DL (ref 0.66–1.25)
GFR SERPL CREATININE-BSD FRML MDRD: 65 ML/MIN/{1.73_M2}
GLUCOSE SERPL-MCNC: 245 MG/DL (ref 70–99)
HGB BLD-MCNC: 13 G/DL (ref 13.3–17.7)
NT-PROBNP SERPL-MCNC: 796 PG/ML (ref 0–125)
POTASSIUM SERPL-SCNC: 4.2 MMOL/L (ref 3.4–5.3)
SODIUM SERPL-SCNC: 134 MMOL/L (ref 133–144)

## 2020-03-24 PROCEDURE — 85018 HEMOGLOBIN: CPT | Performed by: INTERNAL MEDICINE

## 2020-03-24 PROCEDURE — 80048 BASIC METABOLIC PNL TOTAL CA: CPT | Performed by: INTERNAL MEDICINE

## 2020-03-24 PROCEDURE — 83880 ASSAY OF NATRIURETIC PEPTIDE: CPT | Performed by: INTERNAL MEDICINE

## 2020-03-24 NOTE — TELEPHONE ENCOUNTER
"Spoke to pt. He is still feeling \"off and dizzy\". Worse in the evening. I did let home know that Gill wants to do a phone visit with him 2 wks from his last appt, but will message her to see if she wants to do one this wk.     BP this am 114/90, HR 78. No standing one.   Princess Chapa RN 12:36 PM 03/24/20    "

## 2020-03-25 NOTE — TELEPHONE ENCOUNTER
You  Gill Doty PA Yesterday (12:36 PM)       Phone visit this Thursday since he is still not feeling well??    Routing comment

## 2020-03-25 NOTE — TELEPHONE ENCOUNTER
Gill Doty PA  You 6 hours ago (8:35 AM)       I see we scheduled a phone visit this week as we discussed, which sounds good.   Any updates on lab draw? I'd prefer to have labs available before we do another phone visit so I can adjust things if needed.     Jonathon Garland

## 2020-03-26 ENCOUNTER — VIRTUAL VISIT (OUTPATIENT)
Dept: CARDIOLOGY | Facility: CLINIC | Age: 70
End: 2020-03-26
Attending: PHYSICIAN ASSISTANT
Payer: COMMERCIAL

## 2020-03-26 DIAGNOSIS — I50.23 ACUTE ON CHRONIC SYSTOLIC CONGESTIVE HEART FAILURE (H): ICD-10-CM

## 2020-03-26 DIAGNOSIS — I50.22 CHRONIC SYSTOLIC CONGESTIVE HEART FAILURE (H): ICD-10-CM

## 2020-03-26 DIAGNOSIS — I10 HYPERTENSION GOAL BP (BLOOD PRESSURE) < 140/90: ICD-10-CM

## 2020-03-26 DIAGNOSIS — I25.119 CORONARY ARTERY DISEASE INVOLVING NATIVE CORONARY ARTERY OF NATIVE HEART WITH ANGINA PECTORIS (H): ICD-10-CM

## 2020-03-26 PROCEDURE — 99214 OFFICE O/P EST MOD 30 MIN: CPT | Mod: TEL | Performed by: PHYSICIAN ASSISTANT

## 2020-03-26 RX ORDER — CARVEDILOL 25 MG/1
TABLET ORAL
Qty: 180 TABLET | Refills: 0 | Status: SHIPPED | OUTPATIENT
Start: 2020-03-26 | End: 2020-04-13

## 2020-03-26 NOTE — PROGRESS NOTES
"[facility] TELEPHONE VISIT    Jayro Elder is a 69 year old male who is being evaluated via a billable telephone visit.      The patient has been notified of following:     \"This telephone visit will be conducted via a call between you and your physician/provider. Given concern for spread of COVID 19 we are minimizing in person clinic visits when possible. We have found that certain health care needs can be provided without the need for a physical exam.  This service lets us provide the care you need with a short phone conversation.  If a prescription is necessary we can send it directly to your pharmacy.  If lab work is needed we can place an order for that and you can then stop by our lab to have the test done at a later time. If during the course of the call the physician/provider feels a telephone visit is not appropriate, you will not be charged for this service.\"       I have reviewed and updated the patient's Past Medical History, Social History, Family History and Medication List.    MEDICATIONS:  Current Outpatient Medications   Medication Sig Dispense Refill     apixaban ANTICOAGULANT (ELIQUIS) 5 MG tablet Take 1 tablet (5 mg) by mouth 2 times daily       ASPIRIN NOT PRESCRIBED (INTENTIONAL) Antiplatelet medication not prescribed intentionally (already on plavix/eliquis)       atorvastatin (LIPITOR) 40 MG tablet Take 1 tablet (40 mg) by mouth every evening 90 tablet 0     benzonatate (TESSALON) 100 MG capsule Take 2 capsules (200 mg) by mouth 3 times daily as needed for cough (Patient not taking: Reported on 3/20/2020) 42 capsule 3     carvedilol (COREG) 25 MG tablet Take 1 tablet (25 mg) by mouth 2 times daily (with meals) 180 tablet 0     Cholecalciferol (VITAMIN D3) 2000 units TABS Take 1 tablet by mouth daily       clopidogrel (PLAVIX) 75 MG tablet Take 1 tablet (75 mg) by mouth daily 90 tablet 3     fluticasone (FLONASE) 50 MCG/ACT nasal spray Spray 2 sprays into both nostrils daily (Patient not " taking: Reported on 3/20/2020) 18.2 mL 1     furosemide (LASIX) 40 MG tablet Take 1 tablet (40 mg) by mouth daily 90 tablet 3     gabapentin (NEURONTIN) 100 MG capsule Take 2 capsules (200 mg) by mouth At Bedtime 180 capsule 0     insulin aspart (NOVOLOG FLEXPEN) 100 UNIT/ML pen Inject 8 Units Subcutaneous daily (with lunch)       insulin aspart (NOVOLOG FLEXPEN) 100 UNIT/ML pen Inject 10 Units Subcutaneous daily (with dinner)       insulin aspart (NOVOLOG FLEXPEN) 100 UNIT/ML pen Inject Subcutaneous 3 times daily (with meals) Sliding Scale  Do not give sliding scale insulin if Pre-Meal BG less than 140.   For Pre-Meal:   - 200 give 2 units.    - 250 give 4 units.    - 300 give 6 units.    - 350 give 8 units.    - 400 give 10 units.       insulin aspart (NOVOLOG PEN) 100 UNIT/ML pen Inject 8 Units Subcutaneous daily (with breakfast)        insulin glargine (LANTUS SOLOSTAR PEN) 100 UNIT/ML pen Inject 36 Units Subcutaneous every morning       insulin pen needle 31G X 6 MM Use  as directed. 100 each prn     isosorbide mononitrate (IMDUR) 60 MG 24 hr tablet Take 1 tablet (60 mg) by mouth daily 90 tablet 0     levothyroxine (SYNTHROID, LEVOTHROID) 137 MCG tablet Take 137 mcg by mouth daily  90 tablet 3     lisinopril (ZESTRIL) 40 MG tablet Take 1 tablet (40 mg) by mouth daily 90 tablet 0     nitroglycerin (NITROSTAT) 0.4 MG sublingual tablet Place 1 tablet (0.4 mg) under the tongue every 5 minutes as needed for chest pain (Patient not taking: Reported on 3/20/2020) 50 tablet 0     NONFORMULARY Topical patch for diabetes (blood glucose control) - changes every 4 days or so       order for DME Equipment being ordered: Walker Wheels () and Walker ()  Treatment Diagnosis: Impaired gait, heel WB bilaterally. (Patient not taking: Reported on 3/20/2020) 1 each 0     pantoprazole (PROTONIX) 40 MG enteric coated tablet Take 1 tablet (40 mg) by mouth every morning (before breakfast) 30  tablet 0       ALLERGIES  No known allergies    Review Of Systems  Skin: negative  Eyes: glasses  Ears/Nose/Throat: negative  Respiratory: Occasional SOB w/increased activity  Cardiovascular: Lightheaded, dizzy. Fatigue.   Gastrointestinal: negative  Genitourinary: negative  Musculoskeletal: negative  Neurologic: Frequent headaches, no change.   Psychiatric: negative  Hematologic/Lymphatic/Immunologic: negative  Endocrine: thyroid disorder  (ROS TAKEN BY Henrry Syed LPN  PRIOR TO VISIT)    Self reported vitals:  Weight: 242 lbs  /69 (sitting) 92/60 (standing)  HR 71    HPI:  Mr. Elder is a 68 year old male with a PMHx including DM2, hypothyroidism, hypertension, CVA, untreated MARISOL, and renal dysfunction. He also has chronic afib (on AC), and known coronary disease with prior MI/stenting and resultant ischemic CMO with chronic EF of 35-40%. He also struggles with peripheral wounds from his diabetes. In Jan 2019 he was admitted for osteomyelitis and his stay was complicated by ABNER with IV antibiotics and his long term clopidogrel was stopped at that time. He presented back shortly after, with weight gain and ANDREWS. He was again hospitalized and treated for a CHF exacerbation, and was diuresed ~30 pounds. Echo showed no new WMAs and his EF remained in the 35-40% range. He was again hospitalized in March 2019 for right foot cellulitis and right toe osteomyelitis. He got IV abx and underwent partial toe amputation as well as I+D of his left foot ulcer. During that stay, he did not have an exacerbation of his heart failure, and his atrial fibrillation remained under good control.      In early July 2019 he was admitted again for evaluation of chest pain. He underwent SUSAN x 3 to the RCA. Echo showed EF remained 35-40% without significant changes. He returned a few weeks later with atypical chest pain, and medical management was recommended as repeat stress test did not show any new ischemia. I have been  following him periodically in [facility], but we had been spacing out visits at his request due to to finances/transportation. At our last formal visit in Nov 2019 he was at his baseline and I made no changes at that time.    Over the last few weeks he has struggled with low BPs at home with orthostasis. While he does have chronic dizziness, he felt this was worse. At our last phone visit, he reported home BP was 93/71 sitting, 80/65 standing. Home heart rates in the 70s. I've since had him stop spironolactone and lisinopril. I asked that his home domo team check some labs this week.      Today we are having another formal phone visit to minimize potential exposure to COVID 19.  His labs done a few days ago showed fortunately normal renal function and electrolytes. I also checked a hemoglobin, which was 13.0. His NT-proBNP was elevated at 796, though notably, he has been as high as 8k when volume overloaded. Via phone a few days ago, he continued to have some mild dizziness and BP still low.  I told him to hold his carvedilol AM dose and only take half PM dose if BP came up. He tells me that he held this as well yesterday during the day and started feeling a little better, but did still take a full dose 25mg of carvedilol last night before bed. He forgot to take his BP prior to that, but says today he's feeling dizzy again. BP at home this morning was 105/70. He continues to deny any chest pain or palpitations, and thinks his edema is under good control.     Assessment/Plan:    1.  Chronic chronic systolic heart failure, ischemic cardiomyopathy.              --Known chronic EF 35-40%, which remained unchanged per last echo July 2019 when he returned with angina, requiring repeat intervention.               --He is having hypotension with orthostatic symptoms the last few weeks. He is now off both lisinopril and spironolactone, and still having dizziness periodically. Today I asked him to hold carvedilol as well.  "He will check BP and HR twice daily, and take half of his usual dose (12.5mg) only if his SBP is >130 or HR is >100. Otherwise he will hold this as well. At this point, there are no other secondary causes of his hypotension. His hemoglobin was normal, renal function/electrolytes normal. He denies fevers and says his foot wounds are healing well (home health has also been visiting). I asked him to see urgent evaluation if he has presyncope/syncope despite holding his cardiac medications.               --Per his report, weight is stable and he has no edema. Thus, continue Lasix 40mg daily. As mentioned, renal function has remained normal.                2. Chronic atrial fibrillation.              --Appears chronically rate controlled, self reports heart rates are in the 70s. He is occasionally symptomatic with \"flutters\" though has declined a Ziopatch in the past. Plan for carvedilol as above.               --Continue Eliquis 5mg BID.      3. Coronary artery disease.              --Long cardiac hx including MI in 2005 with proximal LAD stenting, repeat LAD stenting in 2009 and distal LAD stenting in 2013. In June 2017 he had stent placement to an obtuse marginal. Most recently, in July 2019, he was found to have progression of his disease and underwent SUSAN x 3 to the RCA. He is free of chest pain currently. He is chronically on Imdur which I have continued for now.               --Continue Plavix without change, along with his Eliquis as above. He denies any new bleeding issues, normal hemoglobin earlier this week.               --Continue atorvastatin 40mg at bedtime. Last LDL under excellent control at 43, July 2019. Plan routine repeat fasting labs this summer.       Follow up plan:  RN will call for update early next week. I will see him for a repeat phone visit in 2-3 weeks.       I have reviewed the note as documented above.  This accurately captures the substance of my conversation with the patient.      Phone " call contact time  Call Started at 1520  Call Ended at 1535    Gill Doty PA-C  [facility]  Pager [phone]

## 2020-03-26 NOTE — PATIENT INSTRUCTIONS
Call the [facility] nurse for any questions or concerns:  [phone]  After hours: [phone]     Plan for today:  Continue to hold the lisinopril and spironolactone.  Take you BP and HR twice daily. If your systolic BP is >130 or if your heart rate is >100, then take half pill of carvedilol (12.5mg). If neither of these parameters is met, continue to hold your carvedilol as well.     We will call you on Monday for an update.   If you feel poorly over the weekend and continue with low BP and dizziness despite holding all of your medications, then you need to go to the ER for further evaluation.     We will plan another telephone visit with Gill in 2-3 weeks.

## 2020-03-30 ENCOUNTER — TELEPHONE (OUTPATIENT)
Dept: CARDIOLOGY | Facility: CLINIC | Age: 70
End: 2020-03-30

## 2020-03-30 DIAGNOSIS — R42 DIZZINESS: ICD-10-CM

## 2020-03-30 DIAGNOSIS — I48.91 ATRIAL FIBRILLATION, UNSPECIFIED TYPE (H): Primary | ICD-10-CM

## 2020-03-30 NOTE — TELEPHONE ENCOUNTER
Gill Doty PA Gerdowsky, Christina, RN    Caller: Unspecified (Today, 10:42 AM)               Have him resume carvedilol at 12.5mg BID standing.  If his SBP is >130, he should take the full 25mg dose BID. Keep checking HR/BP twice daily.  For now, I will keep holding lisinopril and aldactone. But get an update on how he's feeling and BP early Wednesday. I might resume lisinopril at that time.   It would be helpful to do a 48 hour Holter if he's willing, to make sure he isn't having pauses or significant arrhythmias. See if he's willing to come in to have one placed.   If he gets significant CP again and dizziness despite BP no longer being low, he should probably go in. I am waiting to hear back from Dr Tomlin yet on any further recommendations.     Thanks   Gill

## 2020-03-30 NOTE — TELEPHONE ENCOUNTER
"Spoke to pt and Leann. Gave them both instructions to \"resume carvedilol at 12.5mg BID standing.  If his SBP is >130, he should take the full 25mg dose BID. Keep checking HR/BP twice daily. \" Instructed both to continue to hold lisinopril and aldactone for right now.   Both expressed understanding.   Will get an update Wednesday    Princess Chapa RN 3:10 PM 03/30/20    "

## 2020-03-30 NOTE — TELEPHONE ENCOUNTER
Received VM from [facility] RN, Leann, reporting higher than normal BPs and if we want to add anything back.   Left VM for Leann to let her know [facility] is aware of BPs, pt is still having episodes of dizziness/not feeling right and are reviewing with Gill and Dr Tomlin.    Princess Chapa, RN 2:23 PM 03/30/20

## 2020-03-30 NOTE — TELEPHONE ENCOUNTER
"Pt reports he is still having LH, dizziness, not feeling \"right. I almost went in this wkend. I feel ok during the day but as the day goes on I start to feel crummy\".     BPs:   3/30 - sittin/101, HR 79            Standin/86     3/29   Sittin/79, HR 76            Standin/79  ** in evening he did have CP w/takign deep breath, did not feel like he could take a deep breath. Felt LH as well. Almost went to ER then CP subsided on own.     3/28  9 am Sittin/83, HR 73           Standin/79         1320  Sittin/79, HR 76                 Standin/82, HR 74    **NO COREG as SBP was > 130 and HR >100 (Per plan 3/26: Take you BP and HR twice daily. If your systolic BP is >130 or if your heart rate is >100, then take half pill of carvedilol (12.5mg). If neither of these parameters is met, continue to hold your carvedilol as well.)    Wt on 3/27 was 242 lbs. He is 247 lbs today. He did have scalloped potatoes last night.   He denies SOB, ANDREWS, abd bloat or leg swelling. Denies poor appetite.     He is drinking 6-8 \"8-10 oz\" glasses off water a day along with 2-3 cups of coffee.   BG was 167 today, reports BG has been \"under good control lately\".   Next appt  with Gill on phone.   Last echo was 19 with EF of 35-40%.    Princess Chapa RN 11:05 AM 20      "

## 2020-04-01 NOTE — TELEPHONE ENCOUNTER
"Spoke to pt. He reports he feels \"ok in the morning but as they day goes on I just don't feel quite, Feel dizzy and I don;t know how else to describe it.\" Pt also reports CP, more in the evening when he is not feeling \"right\". He has a hard time describing it: pressure, heaviness, sharp, etc. \"I'm not sure sure. I can feel something in my chest.\" Denies palpations or like someone is pushing or siting on his chest. No nausea,  diaphoresis or SOB associated with it.   He is continuing to hold his lisinopril and aldactone.   Talked to pt about possibly wearing Holter monitor, \"whatever you think needs to be done. I just don't want to go to the hospital.\" Did let pt know we are doing our best to avoid that.     Bps:   3/31   9 am   122/82, HR 80  Sitting, took Coreg 12.5 mg              121/78, HR 55  Standing, little \"dizzy\"  1200    118/76, HR 78 Sitting              104/70, HR 77 Standing, little\"dizzy\"  1700     146/98, HR 73 Sitting, Coreg 25 mg                141/100, HR 88, Standing, little \"dizzy & CP\"    4/1     1000     116/87, HR 76 Sitting, Coreg 12.5 mg               119/83, HR 73  Standing, little \"dizzy\"       Will update Gill.   Next phone visit 4/13 w/Gill Chapa RN 10:46 AM 04/01/20    "

## 2020-04-02 NOTE — TELEPHONE ENCOUNTER
Pt continues to experience same symptoms, more in evening. He is willing to got to R to have 48 hour Holter placed.   Pt reports wts have been stable at 242-245 lbs with no swelling   Will send scheduling a message to call pt and arrange. Pt aware.   Order placed.     Princess Chapa RN 9:43 AM 04/02/20

## 2020-04-02 NOTE — TELEPHONE ENCOUNTER
"Gill Doty, PA  You 20 hours ago (12:53 PM)       Ok. So bottom line is he's still having dizziness despite much better BPs.   Let's keep the same plan for the coreg that I gave you yesterday, and for now continue to hold lisinopril and spironolactone.   Confirm his weight has been overall stable and no worsening edema in his legs.     If he really wants to avoid the hospital we will do our best.   Lets have him come in for a 48 hour Holter monitor. They should show him how to push an \"event\" button so make sure he understands to do that when he's feeling \"dizzy and not right\" so we can at least make sure it isn't related to an arrhythmia.     Thanks   Gill        "

## 2020-04-03 ENCOUNTER — HOSPITAL ENCOUNTER (OUTPATIENT)
Dept: CARDIOLOGY | Facility: CLINIC | Age: 70
Discharge: HOME | End: 2020-04-03
Attending: PHYSICIAN ASSISTANT | Admitting: PHYSICIAN ASSISTANT
Payer: COMMERCIAL

## 2020-04-03 DIAGNOSIS — I48.91 ATRIAL FIBRILLATION, UNSPECIFIED TYPE (H): ICD-10-CM

## 2020-04-03 DIAGNOSIS — R42 DIZZINESS: ICD-10-CM

## 2020-04-03 PROCEDURE — 93227 XTRNL ECG REC<48 HR R&I: CPT | Performed by: INTERNAL MEDICINE

## 2020-04-03 PROCEDURE — 93225 XTRNL ECG REC<48 HRS REC: CPT

## 2020-04-09 ENCOUNTER — TELEPHONE (OUTPATIENT)
Dept: FAMILY MEDICINE | Facility: CLINIC | Age: 70
End: 2020-04-09

## 2020-04-09 NOTE — TELEPHONE ENCOUNTER
Received [facility] orders 03/09-05/07/20, placed in Ian JAVIER in basket.    Please review, sign and fax back to [phone].

## 2020-04-10 ENCOUNTER — CARE COORDINATION (OUTPATIENT)
Dept: CARDIOLOGY | Facility: CLINIC | Age: 70
End: 2020-04-10

## 2020-04-10 NOTE — PROGRESS NOTES
From: Gill Doty PA   Sent: 4/9/2020   1:56 PM CDT   To: Princess Chapa RN   Subject: holter monitor                                   Call and let him know that the holter monitor did not show any new concerning arrhythmias. He has the afib and rates are under reasonable control given we have had to reduce his beta blockers recently. When he pushed the button for chest pain, he just had the afib, and no significant ventricular arrhythmias.     Get an update on his dizziness as well as BPs for this week. If he is still having it despite better BPs at home, I am not convinced it is cardiac related for now. But we can keep trying to figure out when I talk to him again next week for our phone visit.     Jonathon Garland

## 2020-04-10 NOTE — PROGRESS NOTES
"Reviewed Holter monitor results with pt.   He is continuing to experience dizziness & feeling \"not right\" in the early evening hours.   He has not had to hold his coreg as his SBP has been >130. They've been   He is continuing to hold lisinopril but restarted the spironolactone (25 mg) yesterday as his wt was\"creeping up and my legs were starting to swell. The left one was bigger, they are better today.\"  He tried to take extra furosemide, \"for a day or 2\", with no effect. He did start to have more urine output and relief in ELIU at went down with the addition of Spironolactone. He took both meds on his own with no direction from [facility].     Wts:   4/10  247.8 lbs  4/9    252 lbs (took spironolactone 25 mg on own)  4/8   251 lbs    Will update Gill to see if she would like a BMP or other changes prior to his 4/13 phone visit.   Princess Chapa RN 11:29 AM 04/10/20           "

## 2020-04-10 NOTE — PROGRESS NOTES
Thank you for the update. At least the intervention that he did helped his swelling/edema and weight is down. As long as his BP is still tolerating that in addition to the carvedilol, they he can stay on it over the weekend until we have our phone visit next week. Historically, his renal function and electrolytes haven't been too big of an issue. So no labs for now, but when I talk to him next week, if I decide to KEEP him on this, I will probably check labs next week.    On a side note, I wanted to confirm that he doesn't have a PCP, correct? He may need someone other than us to help him out in regard to the dizziness. We made sure his BP came back up, and no ventricular arrhythmias. It sounds like he still has some vague dizziness and chest uneasiness but hard to pinpoint his complaints. He has an extensive CAD history so there is also a chance there is something going on there too again. Please encourage him to seek attention in the ER if things worsen before I talk to him next.       Thanks  Gill

## 2020-04-10 NOTE — PROGRESS NOTES
He has been seeing STORMY Huang at [facility].   SBP after meds have 120s/70-80s.   Next HC visit is next Tuesday, 4/14.  He is aware to go to ER if he feels worse.   Princess Chapa RN 2:25 PM 04/10/20

## 2020-04-12 NOTE — PROGRESS NOTES
CARDIOLOGY TELEPHONE VISIT    Jayro Elder is a 69 year old male who is being evaluated via a billable telephone visit.      The patient has been notified of following:     This telephone visit will be conducted via a call between you and your physician/provider. Given concern for spread of COVID 19 we are minimizing in person clinic visits when possible. We have found that certain health care needs can be provided without the need for a physical exam.  This service lets us provide the care you need with a short phone conversation.  If a prescription is necessary we can send it directly to your pharmacy.  If lab work is needed we can place an order for that and you can then stop by our lab to have the test done at a later time. If during the course of the call the physician/provider feels a telephone visit is not appropriate, you will not be charged for this service.    Telephone visits are billed at different rates depending on your insurance coverage. During this emergency period, for some insurers they may be billed the same as an in-person visit.  Please reach out to your insurance provider with any questions.    Patient has given verbal consent for Telephone visit?  Yes      I have reviewed and updated the patient's Past Medical History, Social History, Family History and Medication List.    VITALS REPORTED BY PATIENT:  Weight- 248 lbs  BP- 132/87. 131/93, 138/90  HR- 85. 83. 105    STEPHANIE Ku      MEDICATIONS:  Current Outpatient Medications   Medication Sig Dispense Refill     apixaban ANTICOAGULANT (ELIQUIS) 5 MG tablet Take 1 tablet (5 mg) by mouth 2 times daily       ASPIRIN NOT PRESCRIBED (INTENTIONAL) Antiplatelet medication not prescribed intentionally due to Current anticoagulant therapy (warfarin/enoxaparin)       atorvastatin (LIPITOR) 40 MG tablet Take 1 tablet (40 mg) by mouth every evening 90 tablet 0     carvedilol (COREG) 25 MG tablet Take half tablet (12.5mg) twice daily as long as  systolic BP is >110mmHg. 180 tablet 0     Cholecalciferol (VITAMIN D3) 2000 units TABS Take 1 tablet by mouth daily       clopidogrel (PLAVIX) 75 MG tablet Take 1 tablet (75 mg) by mouth daily 90 tablet 3     furosemide (LASIX) 40 MG tablet Take 1 tablet (40 mg) by mouth daily 90 tablet 3     gabapentin (NEURONTIN) 100 MG capsule Take 2 capsules (200 mg) by mouth At Bedtime 180 capsule 0     insulin aspart (NOVOLOG FLEXPEN) 100 UNIT/ML pen Inject 8 Units Subcutaneous daily (with lunch)       insulin aspart (NOVOLOG FLEXPEN) 100 UNIT/ML pen Inject 10 Units Subcutaneous daily (with dinner)       insulin aspart (NOVOLOG FLEXPEN) 100 UNIT/ML pen Inject Subcutaneous 3 times daily (with meals) Sliding Scale  Do not give sliding scale insulin if Pre-Meal BG less than 140.   For Pre-Meal:   - 200 give 2 units.    - 250 give 4 units.    - 300 give 6 units.    - 350 give 8 units.    - 400 give 10 units.       insulin aspart (NOVOLOG PEN) 100 UNIT/ML pen Inject 8 Units Subcutaneous daily (with breakfast)        insulin glargine (LANTUS SOLOSTAR PEN) 100 UNIT/ML pen Inject 36 Units Subcutaneous every morning       isosorbide mononitrate (IMDUR) 60 MG 24 hr tablet Take 2 tablets (120 mg) by mouth daily 90 tablet 0     levothyroxine (SYNTHROID, LEVOTHROID) 137 MCG tablet Take 137 mcg by mouth daily  90 tablet 3     nitroglycerin (NITROSTAT) 0.4 MG sublingual tablet Place 1 tablet (0.4 mg) under the tongue every 5 minutes as needed for chest pain 50 tablet 0     pantoprazole (PROTONIX) 40 MG enteric coated tablet Take 1 tablet (40 mg) by mouth every morning (before breakfast) 30 tablet 0     spironolactone (ALDACTONE) 25 MG tablet Take 1 tablet (25 mg) by mouth daily       fluticasone (FLONASE) 50 MCG/ACT nasal spray Spray 2 sprays into both nostrils daily (Patient not taking: Reported on 3/20/2020) 18.2 mL 1     insulin pen needle 31G X 6 MM Use  as directed. 100 each prn     NONFORMULARY Topical  patch for diabetes (blood glucose control) - changes every 4 days or so       order for DME Equipment being ordered: Walker Wheels () and Walker ()  Treatment Diagnosis: Impaired gait, heel WB bilaterally. (Patient not taking: Reported on 3/20/2020) 1 each 0       ALLERGIES  No known allergies      Primary cardiologist: Dr. Justin Tomlin      HPI:  Mr. Elder is a 68 year old male with a PMHx including DM2, hypothyroidism, hypertension, CVA, untreated MARISOL, and renal dysfunction. He also has chronic afib (on AC), and known coronary disease with prior MI/stenting and resultant ischemic CMO with chronic EF of 35-40%. He also struggles with peripheral wounds from his diabetes. In Jan 2019 he was admitted for osteomyelitis and his stay was complicated by ABNER with IV antibiotics and his long term clopidogrel was stopped at that time. He presented back shortly after, with weight gain and ANDREWS. He was again hospitalized and treated for a CHF exacerbation, and was diuresed ~30 pounds. Echo showed no new WMAs and his EF remained in the 35-40% range. He was again hospitalized in March 2019 for right foot cellulitis and right toe osteomyelitis. He got IV abx and underwent partial toe amputation as well as I+D of his left foot ulcer. During that stay, he did not have an exacerbation of his heart failure, and his atrial fibrillation remained under good control.      In early July 2019 he was admitted again for evaluation of chest pain. He underwent SUSAN x 3 to the RCA. Echo showed EF remained 35-40% without significant changes. He returned a few weeks later with atypical chest pain, and medical management was recommended as repeat stress test did not show any new ischemia. I have been following him periodically in [facility], but we had been spacing out visits at his request due to to finances/transportation. At our last formal visit in Nov 2019 he was at his perceived baseline and I made no changes at that  "time.     Over the last few weeks he has struggled with low BPs at home with orthostasis. BP was as low as 80/60 while standing, though it sounds like home heart rates remained mostly in the 70s.We temporarily held his lisinopril and spironolactone and I decreased his dose of carvedilol. BPs improved, but dizziness continued. I check labs including a hemoglobin and BMP, both of which were unremarkable. NT-proBNP was elevated at 796, but notably, he has been as high as 8k when volume overloaded.    Today, we are doing another formal telephone visit to help minimize exposure to COVID-19. He tells me he continues to feel poorly mostly after lunchtime. His home BPs are now routinely 130-140s over 80-90s. Over the weekend he had some swelling and self increased his lasix to 80mg x 2 days without result, and then opted to resume his spironolactone. This then resulted in returning his weight to baseline and swelling is better. However, he continues with episodes of \"just not feeling good.\" Upon questioning, he does on occasion have a \"band of tightness\" across his chest and between his shoulders. He cannot pinpoint whether it is with exertion or rest, but thinks it may correlate with some stress while helping watch his grandkids. He feels \"foggy\" in his head a lot and does admit also to having both his arms, L>R feel heavy and \"legs like rubber\" at times. When he sits this gets better, but can sometimes take more than 15-20 minutes to get better. He has a little more ANDREWS as well.        Assessment/Plan:       1. Coronary artery disease.              --Long cardiac hx including MI in 2005 with proximal LAD stenting, repeat LAD stenting in 2009 and distal LAD stenting in 2013. In June 2017 he had stent placement to an obtuse marginal. Most recently, in July 2019, he was found to have progression of his disease and underwent SUSAN x 3 to the RCA. He returned a few weeks later with atypical CP in later July 2019 and Lexiscan did " not show new ischemia. Thus, we have been medically managing him.   --He has had some issues recently including transient hypotension and brief volume overload at home. Upon questioning he tells me he's had some tightness across his chest and between his shoulders. It is not clearly exertional. I am concerned he may be having new ischemia. I will d/w Dr. Tomlin whether a stress test should be performed, or proceed to an angiogram. In the meantime, I will increase his Imdur to 120mg daily as his BP is now normalized. ADDENDUM: After discussion with Dr. Tomlin, he feelts that reported symptoms were still somewhat vague. He recommended repeat Lexiscan first for evaluation to compare to July 2019 post revascularization. If there is no area of ischemia, then will send to cath lab.  I will schedule a phone visit with myself shortly after to discuss results and continued management plans.               --Continue Plavix without change, along with his Eliquis as below. He denies any new bleeding issues, with a recent normal hemoglobin earlier this week.               --Continue atorvastatin 40mg at bedtime. Last LDL under excellent control at 43, July 2019. Plan routine repeat fasting labs this summer.     2.  Chronic systolic heart failure, known ischemic cardiomyopathy.              --Known chronic EF 35-40%, which remained unchanged per last echo July 2019 when he returned with angina, requiring repeat intervention.               --He had hypotension with orthostatic symptoms the last few weeks. BP improved after transiently holding many of his cardiac medications, but symptomatically he is not improving. Plan is for repeat ischemic evaluation, but his BPs are now trending up. I've asked him to resume taking his carvedilol 12.5mg BID as long as systolic BP is >110mmHg with the increase in Imdur as above. For now, will continue to hold the lisinopril.    --Will continue the spironolactone and the Lasix at 40mg daily. Per  his report, his swelling is now gone and weight is back to baseline. Historically, renal function has been normal.             3. Chronic atrial fibrillation.              --Appears chronically rate controlled, self reports heart rates are in the 70s. He is occasionally symptomatic, and given his recent dizziness we checked a Holter last week. This showed average HR 88 with chronic afib. During his episodes of chest pain rhythm was afib with occasional PVCs, no significant ventricular arrhythmias detected.              --Continue Eliquis 5mg BID.      Follow up plan: Pending after discussion with Dr. Tomlin.       I have reviewed the note as documented above.  This accurately captures the substance of my conversation with the patient.      Phone call contact time  Call Started at 1016  Call Ended at 1033  Return call started at 1415  Return call ended at 1419    Gill Doty PA-C  [facility]  Pager [phone]

## 2020-04-13 ENCOUNTER — VIRTUAL VISIT (OUTPATIENT)
Dept: CARDIOLOGY | Facility: CLINIC | Age: 70
End: 2020-04-13
Attending: PHYSICIAN ASSISTANT
Payer: COMMERCIAL

## 2020-04-13 DIAGNOSIS — I25.119 CORONARY ARTERY DISEASE INVOLVING NATIVE CORONARY ARTERY OF NATIVE HEART WITH ANGINA PECTORIS (H): Primary | ICD-10-CM

## 2020-04-13 DIAGNOSIS — I48.20 CHRONIC ATRIAL FIBRILLATION (H): ICD-10-CM

## 2020-04-13 DIAGNOSIS — I48.91 ATRIAL FIBRILLATION, UNSPECIFIED TYPE (H): ICD-10-CM

## 2020-04-13 DIAGNOSIS — I50.22 CHRONIC SYSTOLIC CONGESTIVE HEART FAILURE (H): ICD-10-CM

## 2020-04-13 DIAGNOSIS — Z53.9 DIAGNOSIS NOT YET DEFINED: Primary | ICD-10-CM

## 2020-04-13 DIAGNOSIS — I10 HYPERTENSION GOAL BP (BLOOD PRESSURE) < 140/90: ICD-10-CM

## 2020-04-13 DIAGNOSIS — I50.23 ACUTE ON CHRONIC SYSTOLIC CONGESTIVE HEART FAILURE (H): ICD-10-CM

## 2020-04-13 PROCEDURE — 99213 OFFICE O/P EST LOW 20 MIN: CPT | Mod: 95 | Performed by: PHYSICIAN ASSISTANT

## 2020-04-13 PROCEDURE — G0180 MD CERTIFICATION HHA PATIENT: HCPCS | Performed by: PHYSICIAN ASSISTANT

## 2020-04-13 RX ORDER — SPIRONOLACTONE 25 MG/1
25 TABLET ORAL DAILY
COMMUNITY
Start: 2020-04-13 | End: 2020-11-19

## 2020-04-13 RX ORDER — ISOSORBIDE MONONITRATE 60 MG/1
120 TABLET, EXTENDED RELEASE ORAL DAILY
Qty: 90 TABLET | Refills: 0 | Status: ON HOLD
Start: 2020-04-13 | End: 2020-04-18

## 2020-04-13 RX ORDER — CARVEDILOL 25 MG/1
TABLET ORAL
Qty: 180 TABLET | Refills: 0
Start: 2020-04-13 | End: 2020-04-27 | Stop reason: DRUGHIGH

## 2020-04-13 NOTE — PATIENT INSTRUCTIONS
Visit Summary:    Today we discussed:   Gill will touch base with Dr. Tomlin whether you should have another stress test vs an angiogram.    Medication changes:    INCREASE the Imdur to 120mg (two tablets) once daily.  You can take the carvedilol both morning AND evening if your systolic BP is >110mmHg.   Continue all other medications as is.     Follow up:   Will arrange for follow up pending plans as above.     Please call my nurse Princess at [phone] with any questions or concerns.

## 2020-04-14 ENCOUNTER — TELEPHONE (OUTPATIENT)
Dept: PODIATRY | Facility: CLINIC | Age: 70
End: 2020-04-14

## 2020-04-14 NOTE — TELEPHONE ENCOUNTER
Received message from Central Scheduling that patient requested sooner appointment with Dr. Mckeon than 5/6/20.     Returned call to patient who states that the ulcer on the bottom of his right mid-foot, and second toe have opened up, and have been draining. Drainage has been slightly bloody, and very stinky. Patient reports the drainage started approximately 3-4 days ago, with no improvement.     He denies any fevers, or chills, but has a history of sepsis with toe amputations on the left foot. Has been keeping foot clean and dry.     Patient scheduled for evaluation with Dr. Bhagat.   Pharmacy selected in chart.     Please advise if you have any further recommendations for patient prior to scheduled appointment on 4/16/20.     Silvia Monge, ATC

## 2020-04-15 NOTE — TELEPHONE ENCOUNTER
Patient has an appointment with me tomorrow.  Will assess wounds at that point.    Ryan Bhagat DPM FACFAS FACFAOM  Podiatric Foot & Ankle Surgeon  [facility]  [phone]

## 2020-04-16 ENCOUNTER — ANCILLARY PROCEDURE (OUTPATIENT)
Dept: GENERAL RADIOLOGY | Facility: CLINIC | Age: 70
End: 2020-04-16
Attending: PODIATRIST
Payer: COMMERCIAL

## 2020-04-16 ENCOUNTER — OFFICE VISIT (OUTPATIENT)
Dept: PODIATRY | Facility: CLINIC | Age: 70
End: 2020-04-16
Payer: COMMERCIAL

## 2020-04-16 VITALS
HEIGHT: 76 IN | DIASTOLIC BLOOD PRESSURE: 74 MMHG | SYSTOLIC BLOOD PRESSURE: 130 MMHG | BODY MASS INDEX: 30.48 KG/M2 | WEIGHT: 250.3 LBS

## 2020-04-16 DIAGNOSIS — L97.522 ULCER OF LEFT FOOT, WITH FAT LAYER EXPOSED (H): ICD-10-CM

## 2020-04-16 DIAGNOSIS — E11.42 DIABETIC POLYNEUROPATHY ASSOCIATED WITH TYPE 2 DIABETES MELLITUS (H): Primary | ICD-10-CM

## 2020-04-16 DIAGNOSIS — E11.42 DIABETIC POLYNEUROPATHY ASSOCIATED WITH TYPE 2 DIABETES MELLITUS (H): ICD-10-CM

## 2020-04-16 PROCEDURE — 99214 OFFICE O/P EST MOD 30 MIN: CPT | Mod: 25 | Performed by: PODIATRIST

## 2020-04-16 PROCEDURE — 11042 DBRDMT SUBQ TIS 1ST 20SQCM/<: CPT | Performed by: PODIATRIST

## 2020-04-16 PROCEDURE — 73630 X-RAY EXAM OF FOOT: CPT | Mod: LT

## 2020-04-16 ASSESSMENT — MIFFLIN-ST. JEOR: SCORE: 2001.85

## 2020-04-16 NOTE — PROGRESS NOTES
"Foot & Ankle Surgery   April 16, 2020    S:  Pt is seen today for evaluation of diabetic left foot infection.   2 wounds, sub 1st MPJ L and distal L 2nd toe.  States the distal toe wound has been present for some time, but noticed bleeding in the shower within the past week.  Has been increasingly painful.  Using iodosorb, wears a surgical shoe/DH2 shoe that has fallen apart.    Vitals:    04/16/20 0946   BP: 130/74   Weight: 113.5 kg (250 lb 4.8 oz)   Height: 1.93 m (6' 4\")   '      ROS - Pos for CC.  Patient denies current nausea, vomiting, chills, fevers, belly pain, calf pain, chest pain or SOB.  Complete remainder of ROS it otherwise neg.      PE:  Gen:   No apparent distress  Eye:    Visual scanning without deficit  Ear:    Response to auditory stimuli wnl  Lung:    Non-labored breathing on RA noted  Abd:    NTND per patient report  Lymph:   Swelling distal L 2nd toe  Vasc:    Pulses palpable, CFT minimally delayed  Neuro:    Light touch sensation greatly diminished distally  Derm:    Very large wound distal L 2nd toe with surrounding erythema, but fortunately the bone is not exposed.  The tissue is boggy/macerated..  Very large wound sub 1st MPJ, down to sub-q, minimal surrounding erythema  MSK:    Left lower extremity - pronounced hammertoe L 2nd digit  Calf:    Neg for redness, swelling or tenderness      Imaging:  IMPRESSION: Interval development of soft tissue swelling and soft  tissue gas related to the second toe, especially distally. There is no  definite radiographic evidence for bony destruction to indicate  osteomyelitis. However, if osteomyelitis is clinically suspected, MRI  exam would be more sensitive. Degenerative changes at the first  metatarsophalangeal joint and scattered degenerative changes in the  midfoot are again seen. Pes planus is noted.    Cultures :  MSSA, Enterococcus from 3/19 toe amp cultures      Assessment:  69 year old male with diabetic L 2nd toe and sub 1st MPJ ulcers with " exposed fat and surrounding erythema without exposed bone      Plan:  Discussed etiologies, anatomy and options  1.  diabetic L 2nd toe and sub 1st MPJ ulcers with exposed fat and surrounding erythema without exposed bone  -Excisional debridement was performed, full-thickness, sharply debriding the wound down to and including exposed sub-cutaneous tissue/fat, excising nonviable tissue to the above dimensions with a tissue nipper < 20cm2 total area for 2nd toe and sub1st MPJ.  -Rx for Augmentin BID x 10 days based on previous cultures  -continue every other day Iodosorb dressing changes  -personally reviewed xrays - while imaging indicates gas in the tissue, this does not appear to be gas gangrene clinically.  I suspect this may be 2/2 to the large open wound at the distal aspect of the toe.    -new surgical shoe dispensed today  -discussed amputation of the toe if xrays indicate bone infection or if wound fails to improve.  Consider admission vs same-day procedure for toe amp.  In hopes of minimizing COVID exposure, would likely lean towards same-day procedure       Follow up:  1 week wht Dr Root for wound check or sooner with acute issues      Body mass index is 30.47 kg/m .  Weight management plan: Patient was referred to their PCP to discuss a diet and exercise plan.         Ryan Bhagat DPM FACFAS FACFAOM  Podiatric Foot & Ankle Surgeon  [facility]  [phone]

## 2020-04-16 NOTE — LETTER
"    4/16/2020         RE: Jayro Elder  [address] [place] [address]        Dear Colleague,    Thank you for referring your patient, Jayro Elder, to the [facility]. Please see a copy of my visit note below.    Foot & Ankle Surgery   April 16, 2020    S:  Pt is seen today for evaluation of diabetic left foot infection.   2 wounds, sub 1st MPJ L and distal L 2nd toe.  States the distal toe wound has been present for some time, but noticed bleeding in the shower within the past week.  Has been increasingly painful.  Using iodosorb, wears a surgical shoe/DH2 shoe that has fallen apart.    Vitals:    04/16/20 0946   BP: 130/74   Weight: 113.5 kg (250 lb 4.8 oz)   Height: 1.93 m (6' 4\")   '      ROS - Pos for CC.  Patient denies current nausea, vomiting, chills, fevers, belly pain, calf pain, chest pain or SOB.  Complete remainder of ROS it otherwise neg.      PE:  Gen:   No apparent distress  Eye:    Visual scanning without deficit  Ear:    Response to auditory stimuli wnl  Lung:    Non-labored breathing on RA noted  Abd:    NTND per patient report  Lymph:   Swelling distal L 2nd toe  Vasc:    Pulses palpable, CFT minimally delayed  Neuro:    Light touch sensation greatly diminished distally  Derm:    Very large wound distal L 2nd toe with surrounding erythema, but fortunately the bone is not exposed.  The tissue is boggy/macerated..  Very large wound sub 1st MPJ, down to sub-q, minimal surrounding erythema  MSK:    Left lower extremity - pronounced hammertoe L 2nd digit  Calf:    Neg for redness, swelling or tenderness      Imaging:  IMPRESSION: Interval development of soft tissue swelling and soft  tissue gas related to the second toe, especially distally. There is no  definite radiographic evidence for bony destruction to indicate  osteomyelitis. However, if osteomyelitis is clinically suspected, MRI  exam would be more sensitive. Degenerative changes at the " first  metatarsophalangeal joint and scattered degenerative changes in the  midfoot are again seen. Pes planus is noted.    Cultures :  MSSA, Enterococcus from 3/19 toe amp cultures      Assessment:  69 year old male with diabetic L 2nd toe and sub 1st MPJ ulcers with exposed fat and surrounding erythema without exposed bone      Plan:  Discussed etiologies, anatomy and options  1.  diabetic L 2nd toe and sub 1st MPJ ulcers with exposed fat and surrounding erythema without exposed bone  -Excisional debridement was performed, full-thickness, sharply debriding the wound down to and including exposed sub-cutaneous tissue/fat, excising nonviable tissue to the above dimensions with a tissue nipper < 20cm2 total area for 2nd toe and sub1st MPJ.  -Rx for Augmentin BID x 10 days based on previous cultures  -continue every other day Iodosorb dressing changes  -personally reviewed xrays - while imaging indicates gas in the tissue, this does not appear to be gas gangrene clinically.  I suspect this may be 2/2 to the large open wound at the distal aspect of the toe.    -new surgical shoe dispensed today  -discussed amputation of the toe if xrays indicate bone infection or if wound fails to improve.  Consider admission vs same-day procedure for toe amp.  In hopes of minimizing COVID exposure, would likely lean towards same-day procedure       Follow up:  1 week wht Dr Root for wound check or sooner with acute issues      Body mass index is 30.47 kg/m .  Weight management plan: Patient was referred to their PCP to discuss a diet and exercise plan.         Ryan Bhagat DPM FACFAS FACFAOM  Podiatric Foot & Ankle Surgeon  [facility]  [phone]    Again, thank you for allowing me to participate in the care of your patient.        Sincerely,        Ryan Bhagat DPM, BRICE

## 2020-04-17 ENCOUNTER — PREP FOR PROCEDURE (OUTPATIENT)
Dept: PODIATRY | Facility: CLINIC | Age: 70
End: 2020-04-17

## 2020-04-17 ENCOUNTER — OFFICE VISIT (OUTPATIENT)
Dept: FAMILY MEDICINE | Facility: CLINIC | Age: 70
End: 2020-04-17
Payer: COMMERCIAL

## 2020-04-17 ENCOUNTER — TELEPHONE (OUTPATIENT)
Dept: PODIATRY | Facility: CLINIC | Age: 70
End: 2020-04-17

## 2020-04-17 VITALS
WEIGHT: 258.4 LBS | TEMPERATURE: 97.8 F | BODY MASS INDEX: 31.45 KG/M2 | HEART RATE: 80 BPM | SYSTOLIC BLOOD PRESSURE: 118 MMHG | OXYGEN SATURATION: 99 % | DIASTOLIC BLOOD PRESSURE: 74 MMHG

## 2020-04-17 DIAGNOSIS — E11.21 TYPE 2 DIABETES MELLITUS WITH DIABETIC NEPHROPATHY, WITH LONG-TERM CURRENT USE OF INSULIN (H): ICD-10-CM

## 2020-04-17 DIAGNOSIS — A48.0: Primary | ICD-10-CM

## 2020-04-17 DIAGNOSIS — Z86.73 HISTORY OF CVA (CEREBROVASCULAR ACCIDENT): ICD-10-CM

## 2020-04-17 DIAGNOSIS — I25.119 CORONARY ARTERY DISEASE INVOLVING NATIVE CORONARY ARTERY OF NATIVE HEART WITH ANGINA PECTORIS (H): ICD-10-CM

## 2020-04-17 DIAGNOSIS — Z01.818 PREOP GENERAL PHYSICAL EXAM: Primary | ICD-10-CM

## 2020-04-17 DIAGNOSIS — Z79.4 TYPE 2 DIABETES MELLITUS WITH DIABETIC PERIPHERAL ANGIOPATHY WITHOUT GANGRENE, WITH LONG-TERM CURRENT USE OF INSULIN (H): ICD-10-CM

## 2020-04-17 DIAGNOSIS — I48.91 ATRIAL FIBRILLATION, UNSPECIFIED TYPE (H): ICD-10-CM

## 2020-04-17 DIAGNOSIS — E11.51 TYPE 2 DIABETES MELLITUS WITH DIABETIC PERIPHERAL ANGIOPATHY WITHOUT GANGRENE, WITH LONG-TERM CURRENT USE OF INSULIN (H): ICD-10-CM

## 2020-04-17 DIAGNOSIS — Z79.4 TYPE 2 DIABETES MELLITUS WITH DIABETIC NEPHROPATHY, WITH LONG-TERM CURRENT USE OF INSULIN (H): ICD-10-CM

## 2020-04-17 LAB
HBA1C MFR BLD: 10.4 % (ref 0–5.6)
HGB BLD-MCNC: 12.5 G/DL (ref 13.3–17.7)

## 2020-04-17 PROCEDURE — 85018 HEMOGLOBIN: CPT | Performed by: PHYSICIAN ASSISTANT

## 2020-04-17 PROCEDURE — 83036 HEMOGLOBIN GLYCOSYLATED A1C: CPT | Performed by: PHYSICIAN ASSISTANT

## 2020-04-17 PROCEDURE — 99215 OFFICE O/P EST HI 40 MIN: CPT | Performed by: PHYSICIAN ASSISTANT

## 2020-04-17 PROCEDURE — 36415 COLL VENOUS BLD VENIPUNCTURE: CPT | Performed by: PHYSICIAN ASSISTANT

## 2020-04-17 NOTE — H&P
Cardiology History and Physical, [facility]  Patient: Jayro Elder MRN# [id number]   Age: 68 year old YOB: 1950     Attending physician: [id number] [Princess Anand MD]  Date of Admission: 1/17/2019  Date of Service: 1/17/2019   PCP: Davion Cordova          Chief complaint:     Shortness of Breath, Weight gain          History of Present Illness:     History obtained from the patient and medical record.    Jayro Elder is a 68 year old male with a history of type 2 diabetes complicated by prior diabetic foot infections, subacute left occipital CVA, coronary artery disease with stents, and associated ischemic cardiomyopathy with most recent echocardiogram completed 9/2018 showing an ejection fraction of 35-40% with indeterminate diastolic dysfunction, stress testing completed on 9/14/2018 showing a fixed large mid to distal anterior and anteroseptal defect, atrial fibrillation on chronic apixaban, untreated obstructive sleep apnea, recent admission with second toe osteomyelitis s/p toe amputation on 1/6 and 1/8, presented to ED with orthopnea, abdominal distension, and bilateral leg swelling.  Patient reported that he had second toe amputation for osteomyelitis on January 6 and 8 and course was complicated by ABNER.  He was discharged on January 14.  He was having more shortness of breath over the last week.  He reported that he has shortness of breath especially at night, and he needs to sit up.  Also he is not physically active due to the recent surgery, but he has more dyspnea on exertion.  Prior to the toe infection, he was able to go a flight of stairs, and walk around the neighborhood.  However, he can only walk in the house, and he uses a lift for stairs.  Also reported some appetite loss.  He checks his weight every day, and noted 22 pounds up for the last week.  He reported some chest discomfort occasionally.  The discomfort is located in midsternal,  Problem: Altered Mood, Psychotic Behavior:  Goal: Able to demonstrate trust by eating, participating in treatment and following staff's direction  Description: Able to demonstrate trust by eating, participating in treatment and following staff's direction  Outcome: Ongoing  Note: Patient eating meals, does not appear to be paranoid about eating food that is not prepackaged, taking medications as prescribed. Goal: Able to verbalize decrease in frequency and intensity of hallucinations  Description: Able to verbalize decrease in frequency and intensity of hallucinations  Outcome: Ongoing  Note: Patient denies hallucinations at present time. Goal: Able to verbalize reality based thinking  Description: Able to verbalize reality based thinking  Outcome: Ongoing  Note: Patient alert and oriented x 3, not to situation, reports \"I don't know why I am here\". Patient appears less paranoid compared to yesterday. Goal: Absence of self-harm  Description: Absence of self-harm  Outcome: Ongoing  Note: No self harm behaviors were observed or reported so far this shift. Remains on every 15 minutes precautions for safety. Goal: Ability to achieve adequate nutritional intake will improve  Description: Ability to achieve adequate nutritional intake will improve  Outcome: Ongoing  Note: Patient eating 75% of meals today. Encourage patient to drink supplements for increase calorie intake. Goal: Ability to interact with others will improve  Description: Ability to interact with others will improve  Outcome: Ongoing  Note: Patient on the fringe with peers. Goal: Compliance with prescribed medication regimen will improve  Description: Compliance with prescribed medication regimen will improve  Outcome: Ongoing  Note: Patient taking medications as prescribed. Goal: Patient specific goal  Description: Patient specific goal  Outcome: Ongoing  Note: Patient reports goal for today is to \"get pain under control\".  Patient continues "described as \"sharp pain\".  The pain is not associated with exertion, it is more associated with shortness of breath when lying flat.  He denied palpitation, dizziness, lightheadedness, syncope, or fall.  No fever, nausea, vomiting, diarrhea, or constipation.  However, he reported some loose stool twice a day, and abdominal discomfort with abdominal distention.  He noted that his back is more hard and stiff.  Denied fever, but has chills.    Cardiac history  - LAD SUSAN in 2005  - SUSAN to proximal mid LAD and distal LAD in 2009  - restenosis with stent to LAD in 2013.    - Afib diagnosed in 04/2017   - SUSAN to severe OM3 on 6/13/2017    Review of Symptoms  10-point ROS reviewed & found negative w/ exceptions noted in the HPI.    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     Cardiomyopathy (H)      Cellulitis of left lower extremity 3/13/2018     Cerebral infarction (H)     eye sight decreased in peripheral of right side and blurry     Diabetic ulcer of left midfoot associated with type 2 diabetes mellitus, with fat layer exposed (H) 3/13/2018     Essential hypertension, benign 11/13/2002     Generalized osteoarthrosis, unspecified site 11/13/2002     Microalbuminuria 3/13/2018    X1     Myocardial infarction (H)      Neuropathy      Sepsis (H)      Sleep apnea     doesn't use it     Substance abuse (H)      Syncope, unspecified syncope type 3/13/2018     Thyroid disease     takes medicine     Tobacco use disorder 11/13/2002     Type 2 diabetes mellitus with diabetic peripheral angiopathy without gangrene, unspecified long term insulin use status 3/13/2018     Type " II or unspecified type diabetes mellitus without mention of complication, not stated as uncontrolled 7/14/2005     Past Surgical History:   Procedure Laterality Date     AMPUTATE TOE(S) Right 1/6/2019    Procedure: Right open second toe partial amputation;  Surgeon: Sabino Garcia DPM;  Location: [facility] OR     AMPUTATE TOE(S) Right 1/8/2019    Procedure: 1.  Revision right second toe amputation with resection of distal half of proximal phalanx with full closure.    2.  Debridement of callus/preulcerative lesion, distal left second toe and plantar left first metatarsophalangeal joint.;  Surgeon: Ryan Bhagat DPM;  Location: [facility] OR     ANGIOGRAM  6/29/05    subtotal occ.mid LAD,SUSAN mid LAD     ANGIOGRAM  7/05    mild CAD,patent stent,no flow-limiting lesions,sev.LV dysf.LAD enlarged     ANGIOGRAM  2/09    Sev.single vessel disease,Mod LV dysf.distal LAD 90%,70-75% mid lad just before prev stent,SUSAN to prox.mid LAD, endeavor to distal LAD     ANGIOGRAM  11/13/13    restenosis, stent LAD     BACK SURGERY      back surgery x3     C NONSPECIFIC PROCEDURE      Laminectomy x 3 - (1983 x 2 & 1990)     C NONSPECIFIC PROCEDURE Bilateral 1998    Bunionectomy     C NONSPECIFIC PROCEDURE  1959    Gingival surgery at age 9     HEART CATH LEFT HEART CATH  06/13/2017    SUSAN to OM3     INCISION AND DRAINAGE TOE, COMBINED Bilateral 9/11/2018    Procedure: COMBINED INCISION AND DRAINAGE TOE;  1) Irrigation and debridement ulcer left great toe  2) Amputation 3rd toe right foot at distal interphalangeal joint level;  Surgeon: Miki Harp MD;  Location: [facility] OR     ORTHOPEDIC SURGERY      bunion surgery both feet     ORTHOPEDIC SURGERY      left shoulder     SOFT TISSUE SURGERY      debridement of toe numerous time     VASCULAR SURGERY      7 stents in heart     Cardiac history  - LAD SUSAN in 2005  - SUSAN to proximal mid LAD and distal LAD in 2009  - restenosis with stent to LAD in 2013.    - Afib diagnosed in 04/2017   -  SUSAN to severe OM3 on 6/13/2017    Last Cardiac Hospitalization: 6/2017  Last Angiogram: 6/2017  Coronary angiogram was performed on 6/16/17 showing a patent OM stent. LAD stents with 30-50% instent restenosis. FFR 0.89. RCA has mid 30-40% stenosis.    TTE 9/2018  The left ventricle is normal in size. There is borderline concentric left  ventricular hypertrophy. Left ventricular systolic function is moderately  reduced. The visual ejection fraction is estimated at 35-40%. Left ventricular  diastolic function is indeterminate. There is akinesis of the mid to apical  anterior walls, the mid anteroseptal and apical septal walls and the true LV  apex. There is no thrombus seen in the left ventricle.  The right ventricle is not well visualized. Right ventricular function cannot  be assessed due to poor image quality.  Trace to mild mitral and tricuspid regurgitation.  Mild aortic regurgitation.  No pericardial effusion.  In comparison to the previous report dated 04/24/2017, the findings are  Similar.    Stress test: 9/2018  EKG Findings  The resting EKG demonstrated atrial fibrillation, right bundle branch  block, anteroseptal Q waves. The stress EKG demonstrated no  significant ST-T changes compared to baseline.     Tomographic Findings  Overall, the study quality is adequate . On both stress and rest  images there is moderate to severe intensity mid to distal  anterior/anteroseptal wall photopenic defect which appear of similar  intensity and extent on both rest and stress images. Gated images  demonstrated mid to distal anterior/anteroseptal wall hypokinesia. The  left ventricular ejection fraction was calculated to be 39% with  stress. TID was visually absent    Echocardiogram:  Recent Results (from the past 4320 hour(s))   ECHO COMPLETE WITH OPTISON    Narrative    [id number]  [facility]  [id number]  [id number]           [facility]  Echocardiography Laboratory  [address] [place] [address]         Name: PORTER YANCEY  MRN: [id number]  : 1950  Study Date: 2018 08:12 AM  Age: 68 yrs  Gender: Male  Reason For Study: Chest Pain  Ordering Physician: Lorraine Brunson  Referring Physician: Davion Cordova  Performed By: Dulce Nam     BSA: 2.4 m2  Height: 76 in  Weight: 253 lb  HR: 74  BP: 142/86 mmHg  _____________________________________________________________________________  __        Procedure  Complete Portable Echo Adult. Contrast Optison.  _____________________________________________________________________________  __        Interpretation Summary     The left ventricle is normal in size. There is borderline concentric left  ventricular hypertrophy. Left ventricular systolic function is moderately  reduced. The visual ejection fraction is estimated at 35-40%. Left ventricular  diastolic function is indeterminate. There is akinesis of the mid to apical  anterior walls, the mid anteroseptal and apical septal walls and the true LV  apex. There is no thrombus seen in the left ventricle.  The right ventricle is not well visualized. Right ventricular function cannot  be assessed due to poor image quality.  Trace to mild mitral and tricuspid regurgitation.  Mild aortic regurgitation.  No pericardial effusion.  In comparison to the previous report dated 2017, the findings are  similar.  _____________________________________________________________________________  __        Left Ventricle  The left ventricle is normal in size. There is borderline concentric left  ventricular hypertrophy. Left ventricular systolic function is moderately  reduced. The visual ejection fraction is estimated at 35-40%. Left ventricular  diastolic function is indeterminate. There is akinesis of the mid to apical  anterior walls, the mid anteroseptal and apical septal walls and the true LV  apex. There is no thrombus seen in the left ventricle.     Right Ventricle  The right ventricle is not well visualized.  Right ventricular function cannot  be assessed due to poor image quality.     Atria  The left atrium is moderately dilated. Right atrium not well visualized. There  is no color Doppler evidence of an atrial shunt.     Mitral Valve  There is trace to mild mitral regurgitation.        Tricuspid Valve  There is mild (1+) tricuspid regurgitation.     Aortic Valve  There is mild trileaflet aortic sclerosis. There is mild (1+) aortic  regurgitation. No aortic stenosis is present.     Pulmonic Valve  There is trace pulmonic valvular regurgitation. There is no pulmonic valvular  stenosis.     Vessels  The aortic root is normal size. Normal size ascending aorta. Dilated inferior  vena cava.     Pericardium  There is no pericardial effusion.        Rhythm  The rhythm was atrial fibrillation.  _____________________________________________________________________________  __  MMode/2D Measurements & Calculations     IVSd: 1.3 cm  LVIDd: 5.1 cm  LVIDs: 3.9 cm  LVPWd: 1.1 cm  FS: 22.6 %  LV mass(C)d: 237.3 grams  LV mass(C)dI: 96.9 grams/m2  Ao root diam: 3.4 cm  LA dimension: 5.7 cm  asc Aorta Diam: 3.7 cm  LA/Ao: 1.6  LA Volume (BP): 101.0 ml  LA Volume Index (BP): 41.2 ml/m2  RWT: 0.44           Doppler Measurements & Calculations  Ao V2 max: 143.6 cm/sec  Ao max P.0 mmHg           _____________________________________________________________________________  __           Report approved by: Hank Joshi 2018 10:16 AM                Allergies:     Allergies   Allergen Reactions     No Known Allergies              Family History:     Family History   Problem Relation Age of Onset     Cancer - colorectal Sister      Thyroid Disease Brother      Cardiovascular Brother      Diabetes Brother      Cancer Father         tumor in chest and throat     Arthritis Mother      Thyroid Disease Mother      Diabetes Mother      Glaucoma No family hx of      Macular Degeneration No family hx of            Social History:      Social History     Socioeconomic History     Marital status:      Spouse name: Not on file     Number of children: Not on file     Years of education: Not on file     Highest education level: Not on file   Social Needs     Financial resource strain: Not on file     Food insecurity - worry: Not on file     Food insecurity - inability: Not on file     Transportation needs - medical: Not on file     Transportation needs - non-medical: Not on file   Occupational History     Not on file   Tobacco Use     Smoking status: Former Smoker     Packs/day: 1.00     Years: 25.00     Pack years: 25.00     Types: Cigarettes     Last attempt to quit: 2005     Years since quittin.4     Smokeless tobacco: Never Used   Substance and Sexual Activity     Alcohol use: Yes     Alcohol/week: 2.4 oz     Types: 4 Shots of liquor per week     Comment: almost every day     Drug use: No     Sexual activity: Yes     Partners: Female   Other Topics Concern     Parent/sibling w/ CABG, MI or angioplasty before 65F 55M? Yes      Service Not Asked     Blood Transfusions Not Asked     Caffeine Concern No     Comment: 3 cups daily     Occupational Exposure Not Asked     Hobby Hazards Not Asked     Sleep Concern Not Asked     Stress Concern Not Asked     Weight Concern Not Asked     Special Diet Yes     Comment: watching carb intake     Back Care Not Asked     Exercise No     Comment: some walking     Bike Helmet Not Asked     Seat Belt Not Asked     Self-Exams Not Asked   Social History Narrative     Not on file             Medications:       No current facility-administered medications on file prior to encounter.   Current Outpatient Medications on File Prior to Encounter:  amLODIPine (NORVASC) 5 MG tablet Take 2 tablets (10 mg) by mouth daily   amoxicillin-clavulanate (AUGMENTIN) 875-125 MG tablet Take 1 tablet by mouth daily for 10 days   apixaban ANTICOAGULANT (ELIQUIS) 5 MG tablet Take 1 tablet (5 mg) by mouth 2 times  "daily HOLD this medication until instructed to resume by your primary MD   Cholecalciferol (VITAMIN D3) 2000 units TABS Take 1 tablet by mouth daily   clopidogrel (PLAVIX) 75 MG tablet Take 1 tablet (75 mg) by mouth daily   insulin aspart (NOVOLOG FLEXPEN) 100 UNIT/ML pen Inject Subcutaneous 3 times daily (with meals) USes about 7-10 units with meal depending   insulin glargine U-300 (TOUJEO) 300 UNIT/ML injection Inject 35 units in the morning and 35 units in the evening.   insulin pen needle 31G X 6 MM Use  as directed.   isosorbide mononitrate (IMDUR) 30 MG 24 hr tablet Take 1 tablet (30 mg) by mouth daily   levothyroxine (SYNTHROID, LEVOTHROID) 137 MCG tablet Take 137 mcg by mouth At Bedtime    lisinopril (PRINIVIL/ZESTRIL) 20 MG tablet Take 1 tablet (20 mg) by mouth 2 times daily HOLD this medication until instructed to resume per primary MD   metoprolol succinate (TOPROL-XL) 100 MG 24 hr tablet Take 0.5 tablets (50 mg) by mouth At Bedtime   pantoprazole (PROTONIX) 40 MG enteric coated tablet Take 1 tablet (40 mg) by mouth every morning (before breakfast)   simvastatin (ZOCOR) 40 MG tablet Take 1 tablet (40 mg) by mouth At Bedtime   nitroglycerin (NITROSTAT) 0.4 MG sublingual tablet Place 1 tablet (0.4 mg) under the tongue every 5 minutes as needed for chest pain   order for DME Equipment being ordered: Other: surgical shoe male XLTreatment Diagnosis: sp right 2nd toe amputaion   order for DME Equipment being ordered: Walker Wheels () and Walker ()Treatment Diagnosis: Impaired gait, heel WB bilaterally.           Physical Examination:   BP (!) 158/99 (BP Location: Left arm)   Pulse 77   Temp 98.3  F (36.8  C) (Oral)   Resp 18   Ht 1.93 m (6' 4\")   Wt 117.9 kg (260 lb)   SpO2 97%   BMI 31.65 kg/m    Last 3 days weights:  Patient Vitals for the past 72 hrs:   Weight   01/17/19 2132 117.9 kg (260 lb)   01/17/19 1601 121.7 kg (268 lb 4.8 oz)     Patient Vitals for the past 120 hrs:   Weight "   01/17/19 2132 117.9 kg (260 lb)   01/17/19 1601 121.7 kg (268 lb 4.8 oz)     Wt Readings from Last 5 Encounters:   01/17/19 117.9 kg (260 lb)   01/14/19 119.3 kg (263 lb)   09/06/18 115.1 kg (253 lb 11.2 oz)   03/12/18 114.3 kg (252 lb)   03/03/18 109.6 kg (241 lb 9.6 oz)      Intake/Output Summary (Last 24 hours) at 1/17/2019 2154  Last data filed at 1/17/2019 2149  Gross per 24 hour   Intake --   Output 450 ml   Net -450 ml     GENERAL APPEARANCE: pleasant adult, NAD  HEENT: NCAT, oropharynx clear, no icterus or injection, no palatal jaundice  NECK: JVD+  LUNGS: Rales bilaterally, no wheezing  CARDIOVASCULAR: Heart is with irregular rate/rhythm. No murmurs. JVD+. Lower extremities edema 2+/2+  ABDOMEN: nontender, distended  SKIN: unremarkable; no rash or lesions  MUSCULOSKELETAL: no joint effusions  EXTREMITIES: bilateral LE edema 2+, Second toe surgical site looks clear, no redness or swelling  NEUROLOGIC: alert and oriented, speech fluent; PERRL, EOMI, facial muscles symmetric; no gait abnormalities appreciated.  Psychological: appropriate mood           Laboratory Data:   Metabolic Studies    Recent Labs   Lab Test 01/17/19  1652 01/14/19  0610 01/13/19  0702 01/12/19  0729    138 142 140   POTASSIUM 3.7 3.7 3.6 3.6   CHLORIDE 103 108 109 109   CO2 21 21 23 25   ANIONGAP 12 9 10 6   * 167* 118* 118*   BUN 25 21 18 19   CR 2.91* 3.40* 3.45* 3.28*   GFRESTIMATED 21* 17* 17* 18*   GFRESTBLACK 24* 20* 20* 21*   BETTIE 8.4* 8.6 8.0* 8.2*       Recent Labs   Lab Test 01/17/19  1652 01/13/19  0702 01/11/19  0653 01/05/19  1559  11/02/10  0900   PROTTOTAL 7.5 6.0* 6.0* 6.8   < > 7.1   ALBUMIN 3.3* 2.7* 2.6* 3.2*   < > 4.4   BILITOTAL 1.2 0.5 0.6 1.6*   < > 0.3   ALKPHOS 77 66 62 82   < > 72   AST 10 10 9 16   < > 23   ALT 17 15 14 29   < > 33   LIPASE  --   --   --   --   --  23    < > = values in this interval not displayed.     Hematology  Recent Labs   Lab Test 01/17/19  2222 01/17/19  9896  01/14/19  0610 01/13/19  0702   WBC 10.0 10.1 8.9 6.8   RBC 4.11* 4.18* 4.23* 4.08*   HGB 11.8* 12.1* 12.1* 11.6*   HCT 36.3* 36.6* 37.7* 36.5*   MCV 88 88 89 90   MCH 28.7 28.9 28.6 28.4   MCHC 32.5 33.1 32.1 31.8   RDW 13.1 13.2 13.0 12.9    301 279 241     INR  Recent Labs   Lab Test 09/06/18  1542 06/09/17  1405 11/03/10  0940   INR 1.36* 1.18* 1.03     Troponin  Recent Labs   Lab Test 01/17/19  1652 01/10/19  1443 01/10/19  0643   TROPI <0.015 0.019 0.021     Arterial Blood GasNo lab results found.  Venous Blood Gas   Recent Labs   Lab Test 09/06/18  1541 03/03/18  1546   PHV 7.41 7.45*   PCO2V 37* 42   PO2V 34 19*   HCO3V 23 29*     Lipid Panel  Recent Labs   Lab Test 04/21/17  0440 06/30/16  0849 02/02/15  1016 11/14/13  0710 11/13/13  1540   CHOL 118 109 99 103 105   HDL 51 47 43 30* 34*   LDL 55 47 42 57 58   TRIG 59 75 68 82 65   CHOLHDLRATIO  --   --  2.3 3.5 3.1     Thyroid Studies  Recent Labs   Lab Test 09/06/18  1542 04/21/17  0440 12/02/14   TSH 0.40 3.28 1.80     HgbA1c  Recent Labs   Lab Test 01/05/19  1559 09/06/18  1542 03/04/18  0637   A1C 11.7* 9.4* 8.8*       Inflammatory Markers    Recent Labs   Lab Test 09/08/18  0614 09/07/18  0744 03/07/18  0713 03/06/18  0621 03/05/18  0724 03/04/18  0637 04/21/17  0440 06/21/16  1245   SED 15  --   --   --   --  17  --  5   CRP 81.6* 103.0* 43.3* 67.2* 111.0* 152.0* 4.5 <2.9       Urine Studies    Recent Labs   Lab Test 01/17/19  1903 01/09/19  1606 09/06/18  1542 01/31/12  1825 11/02/10  1135   COLOR Light Yellow Yellow Yellow Yellow Yellow   APPEARANCE Clear Clear Clear Clear Clear   URINEGLC 300* 150* >499* 70* 150*   URINEBILI Negative Negative Negative Negative Negative   URINEKETONE Negative Negative 20* Negative Negative   SG 1.005 1.006 1.013 1.019 1.041*   UBLD Negative Negative Negative Negative Small*   URINEPH 5.0 5.0 5.0 5.0 6.5   PROTEIN Negative 30* Negative 10* Negative   NITRITE Negative Negative Negative Negative Negative    LEUKEST Trace* Negative Negative Negative Negative   RBCU <1 1 0 1 6*   WBCU 3 2 1 1 <1     Microbiology:  Culture Micro   Date Value Ref Range Status   01/17/2019 PENDING  Preliminary   01/06/2019 (A)  Final    Light growth  Peptoniphilus asaccharolyticus  Susceptibility testing not routinely done     01/06/2019 (A)  Final    Light growth  Clostridium species, not perfringens  Susceptibility testing not routinely done     01/06/2019 (A)  Final    Light growth  Streptococcus dysgalactiae serogroup C/G  Susceptibility testing done on previous specimen     01/06/2019 (A)  Final    Single colony  Coagulase negative Staphylococcus  Susceptibility testing not routinely done  These bacteria are part of normal skin michael, but on occasion, may be true pathogens.    Clinical correlation must be applied to interpreting this microbiology result.     01/06/2019 (A)  Final    Heavy growth  Finegoldia magna (Peptostreptococcus jennifer)  Susceptibility testing not routinely done     01/06/2019 (A)  Final    Heavy growth  Streptococcus dysgalactiae serogroup C/G     01/06/2019 Moderate growth  Staphylococcus lugdunensis   (A)  Final   01/05/2019 No growth  Final   01/05/2019 No growth  Final   09/09/2018 No growth  Final   09/08/2018 No growth  Final   09/06/2018 (A)  Final    Cultured on the 1st day of incubation:  Streptococcus agalactiae sero group B     09/06/2018   Final    Critical Value/Significant Value, preliminary result only, called to and read back by  Teresa John RN on [email or web address] @1054 on 9/7/18. .     09/06/2018   Final    (Note)  POSITIVE for STREPTOCOCCUS AGALACTIAE (Group B Strep) by Fullscreen  multiplex nucleic acid test. Penicillin and ampicillin are drugs of  choice, nonsusceptible isolates have not been reported. Final  identification and antimicrobial susceptibility testing will be  verified by standard methods.    Specimen tested with Verigene multiplex, gram-positive blood culture  nucleic acid test for the  following targets: Staph aureus, Staph  epidermidis, Staph lugdunensis, other Staph species, Enterococcus  faecalis, Enterococcus faecium, Streptococcus species, S. agalactiae,  S. anginosus grp., S. pneumoniae, S. pyogenes, Listeria sp., mecA  (methicillin resistance) and Elisha/B (vancomycin resistance).    Critical Value/Significant Value called to and read back by Teresa John RN on 9.7.18 at 1340. bw       09/06/2018 (A)  Final    Cultured on the 1st day of incubation:  Streptococcus agalactiae sero group B  Susceptibility testing done on previous specimen     09/06/2018   Final    Critical Value/Significant Value, preliminary result only, called to and read back by  Ofe Fernandes RN on [email or web address] @1515 on 9/7/18. ch.     09/06/2018 No growth  Final   09/06/2018 Heavy growth  Staphylococcus aureus   (A)  Final   09/06/2018 (A)  Final    Heavy growth  Beta hemolytic Streptococcus group B     09/06/2018 Moderate growth  Normal skin michael    Final   03/03/2018 No growth  Final   03/03/2018 No growth  Final   09/03/2004 No growth after 6 days  Final       Results for orders placed or performed during the hospital encounter of 01/17/19 (from the past 48 hour(s))   EKG 12-lead, tracing only   Result Value Ref Range    Interpretation ECG Click View Image link to view waveform and result    CBC with platelets differential   Result Value Ref Range    WBC 10.1 4.0 - 11.0 10e9/L    RBC Count 4.18 (L) 4.4 - 5.9 10e12/L    Hemoglobin 12.1 (L) 13.3 - 17.7 g/dL    Hematocrit 36.6 (L) 40.0 - 53.0 %    MCV 88 78 - 100 fl    MCH 28.9 26.5 - 33.0 pg    MCHC 33.1 31.5 - 36.5 g/dL    RDW 13.2 10.0 - 15.0 %    Platelet Count 301 150 - 450 10e9/L    Diff Method Automated Method     % Neutrophils 84.9 %    % Lymphocytes 8.5 %    % Monocytes 5.8 %    % Eosinophils 0.6 %    % Basophils 0.1 %    % Immature Granulocytes 0.1 %    Nucleated RBCs 0 0 /100    Absolute Neutrophil 8.6 (H) 1.6 - 8.3 10e9/L    Absolute Lymphocytes 0.9 0.8 - 5.3 10e9/L     Absolute Monocytes 0.6 0.0 - 1.3 10e9/L    Absolute Eosinophils 0.1 0.0 - 0.7 10e9/L    Absolute Basophils 0.0 0.0 - 0.2 10e9/L    Abs Immature Granulocytes 0.0 0 - 0.4 10e9/L    Absolute Nucleated RBC 0.0    Comprehensive metabolic panel   Result Value Ref Range    Sodium 136 133 - 144 mmol/L    Potassium 3.7 3.4 - 5.3 mmol/L    Chloride 103 94 - 109 mmol/L    Carbon Dioxide 21 20 - 32 mmol/L    Anion Gap 12 3 - 14 mmol/L    Glucose 317 (H) 70 - 99 mg/dL    Urea Nitrogen 25 7 - 30 mg/dL    Creatinine 2.91 (H) 0.66 - 1.25 mg/dL    GFR Estimate 21 (L) >60 mL/min/[1.73_m2]    GFR Estimate If Black 24 (L) >60 mL/min/[1.73_m2]    Calcium 8.4 (L) 8.5 - 10.1 mg/dL    Bilirubin Total 1.2 0.2 - 1.3 mg/dL    Albumin 3.3 (L) 3.4 - 5.0 g/dL    Protein Total 7.5 6.8 - 8.8 g/dL    Alkaline Phosphatase 77 40 - 150 U/L    ALT 17 0 - 70 U/L    AST 10 0 - 45 U/L   Troponin I   Result Value Ref Range    Troponin I ES <0.015 0.000 - 0.045 ug/L   Lactic acid whole blood   Result Value Ref Range    Lactic Acid 1.3 0.7 - 2.0 mmol/L   BNP   Result Value Ref Range    N-Terminal Pro BNP Inpatient 8,810 (H) 0 - 900 pg/mL   XR Chest 2 Views    Narrative    Exam:  Chest X-ray 1/17/2019 4:59 PM    History: orthopnea, heart failure suspected    Comparison: 9/6/2018    Findings: PA and lateral views of the chest. Stable cardiac  mediastinal silhouette. No pleural effusion. Mild pulmonary vascular  congestion. Mildly increased interstitial markings. Patchy  retrocardiac opacities. No pneumothorax. The visualized upper abdomen  is unremarkable. Old healed left-sided rib fractures.      Impression    Impression:   1. Pulmonary vascular congestion with increased interstitial markings,  likely mild pulmonary edema.  2. Retrocardiac opacity, the differential includes atelectasis,  consolidation, or pulmonary edema.    I have personally reviewed the examination and initial interpretation  and I agree with the findings.    MARTHA SUTTON MD   UA with  Microscopic reflex to Culture   Result Value Ref Range    Color Urine Light Yellow     Appearance Urine Clear     Glucose Urine 300 (A) NEG^Negative mg/dL    Bilirubin Urine Negative NEG^Negative    Ketones Urine Negative NEG^Negative mg/dL    Specific Gravity Urine 1.005 1.003 - 1.035    Blood Urine Negative NEG^Negative    pH Urine 5.0 5.0 - 7.0 pH    Protein Albumin Urine Negative NEG^Negative mg/dL    Urobilinogen mg/dL Normal 0.0 - 2.0 mg/dL    Nitrite Urine Negative NEG^Negative    Leukocyte Esterase Urine Trace (A) NEG^Negative    Source Urine     WBC Urine 3 0 - 5 /HPF    RBC Urine <1 0 - 2 /HPF    Squamous Epithelial /HPF Urine <1 0 - 1 /HPF    Transitional Epi <1 0 - 1 /HPF   Urine Culture Aerobic Bacterial   Result Value Ref Range    Specimen Description Unspecified Urine     Special Requests Specimen received in preservative     Culture Micro PENDING    EKG 12 lead   Result Value Ref Range    Interpretation ECG Click View Image link to view waveform and result    Glucose by meter   Result Value Ref Range    Glucose 240 (H) 70 - 99 mg/dL   CBC with Differential   Result Value Ref Range    WBC 10.0 4.0 - 11.0 10e9/L    RBC Count 4.11 (L) 4.4 - 5.9 10e12/L    Hemoglobin 11.8 (L) 13.3 - 17.7 g/dL    Hematocrit 36.3 (L) 40.0 - 53.0 %    MCV 88 78 - 100 fl    MCH 28.7 26.5 - 33.0 pg    MCHC 32.5 31.5 - 36.5 g/dL    RDW 13.1 10.0 - 15.0 %    Platelet Count 292 150 - 450 10e9/L    Diff Method Automated Method     % Neutrophils 79.7 %    % Lymphocytes 9.0 %    % Monocytes 9.7 %    % Eosinophils 1.3 %    % Basophils 0.1 %    % Immature Granulocytes 0.2 %    Nucleated RBCs 0 0 /100    Absolute Neutrophil 8.0 1.6 - 8.3 10e9/L    Absolute Lymphocytes 0.9 0.8 - 5.3 10e9/L    Absolute Monocytes 1.0 0.0 - 1.3 10e9/L    Absolute Eosinophils 0.1 0.0 - 0.7 10e9/L    Absolute Basophils 0.0 0.0 - 0.2 10e9/L    Abs Immature Granulocytes 0.0 0 - 0.4 10e9/L    Absolute Nucleated RBC 0.0       EKG: Afib RBBB HR 90 old  anteroseptal and inferior infarct    Intake/Output Summary (Last 24 hours) at 1/17/2019 2154  Last data filed at 1/17/2019 2149  Gross per 24 hour   Intake --   Output 450 ml   Net -450 ml       LINES/TUBES:   Peripheral IV 01/17/19 Left Upper forearm (Active)   Site Assessment WDL 1/17/2019  9:00 PM   Line Status Infusing 1/17/2019  9:00 PM   Phlebitis Scale 0-->no symptoms 1/17/2019  9:00 PM   Infiltration Scale 0 1/17/2019  9:00 PM   Extravasation? No 1/17/2019  9:00 PM   Number of days: 0       Left Radial Interventional Procedure Access (Active)   Number of days: 583             Assessment and Plan:   Jayro Elder is a 68 year old male with a history of type 2 diabetes complicated by prior diabetic foot infections, subacute left occipital CVA, coronary artery disease with stents, and associated ischemic cardiomyopathy with most recent echocardiogram completed 9/2018 showing an ejection fraction of 35-40% with indeterminate diastolic dysfunction, stress testing completed on 9/14/2018 showing a fixed large mid to distal anterior and anteroseptal defect, atrial fibrillation on chronic apixaban, untreated obstructive sleep apnea, recent admission with second toe osteomyelitis s/p toe amputation, presented to ED with CHF exacerbtion.    # CHF exacerbation  # CAD s/p multiple stent  # ICM EF 35-40%  Presented with orthopnea, weight gain, guerrero leg swelling, consistent with CHF exacerbation. No hx of CHF exacerbation. Reported chest discomfort with SOB when lying flat. Also some discomfort with exertion. Negative troponin. CHF is most likely multifactorial, secondary to recent infection, recent surgery, ABNER, underlying ICM. Denied history of heart failure.   - Lasix 80 mg x1, and infusion at 10 mg/hr  - Strict I&Os  - Daily weight  - TTE  - monitor K/Mg  - continue plavix 75 mg, imdur 30 mg,  metoprolol 50 mg qday, simvastatin 40 mg  - holding lisinopril     # Afib  [id number] score 5 (age, HTN, DM, stroke)  -  continue PTA metoprolol  - hold PTA apixaban due to ABNER  - AC with heparin subq    # ABNER on CKD  Cr 2.91 today. Baseline is 0.8-0.9. Had ABNER while previous admission for toe amputation. Peak was 3.45 on 1/13. Slowly improving. Could be multifactorial, secondary to cardiorenal, ABx use (he was at first on vancomycin), Hx of DM, and lisinopril. Renal US was normal.  - Diuresis as above  - Monitor, and avoid nephrotoxins  - Hold lisinopril    # Recent osteomyelitis s/p rt second toe amputation   Had surgery on 1/6 and 1/8. Received vancomycin -> ceftriaxone while inpatient, discharged on Augmentin for 10 days, until 1/23. Need podiatry follow up upon discharge.  - Continue Augmentin for 7 more days.    # Type 2 DM  - continue PTA Lantus 10 U qday (pt was on 35 U BID previously, but dose decreased while previous admission at OSH)  - placed on meduim SSI scale, hypoglycemic protocol.    # Hypertension  - continue home amlodipine 10 mg, metoprolol succinate 50 mg qday, imdur 30 mg qday  - hold lisinopril 20 mg    # Hyperlipidemia: continue home simvastatin 40 mg  # Hypothyrodism: continue home levothyroxine 137 mcg    Code status: Full  FEN: Cardiac/Diabetic  DVT Prophylaxis: has been held apixaban for ABNER. Heparin subq  Family: wife, son updated  Dispo: pending further improvement but likely to home    The patient to be discussed with attending physician in the morning.   The patient is discussed with Cardiology Fellow.  Latasha????????????, PhD  ??????????????????????????. ????????[id number]

## 2020-04-17 NOTE — TELEPHONE ENCOUNTER
Spoke with Jayro and advised he get a preop today with any PCP and the OR will call him today with surgery details.

## 2020-04-17 NOTE — TELEPHONE ENCOUNTER
Foot & Ankle Surgery  April 17, 2020    I called and spoke with Jayro.  We discussed his concerning xray findings.  While clinically this is not as severe as typical gas gangrene cases, this is concerning.  And while it's been <24 hours with starting the antibiotics, his toe is still sore.  I recommend toe amputation.  We will try to facilitate a Same-Day procedure for toe amputation to minimize COVID exposure, but will consider admission if symptoms dictate.  Currently he has been otherwise stable.    Ryan Bhagat DPM [facility]  Podiatric Foot & Ankle Surgeon  [facility]  [phone]

## 2020-04-17 NOTE — PROGRESS NOTES
"Foot & Ankle Surgery  April 17, 2020    Orders signed for \"left 2nd toe amputation\".    MAC    Same-day    Ryan Bhagat DPM [facility]  Podiatric Foot & Ankle Surgeon  [facility]  [phone]      "

## 2020-04-17 NOTE — PATIENT INSTRUCTIONS
Would RECOMMEND to STOP gabapentin for the next few days to see if dizziness improves    I will contact you after I speak with cardiology      Before Your Surgery      Call your surgeon if there is any change in your health. This includes signs of a cold or flu (such as a sore throat, runny nose, cough, rash or fever).    Do not smoke, drink alcohol or take over the counter medicine (unless your surgeon or primary care doctor tells you to) for the 24 hours before and after surgery.    If you take prescribed drugs: Follow your doctor s orders about which medicines to take and which to stop until after surgery.    Eating and drinking prior to surgery: follow the instructions from your surgeon    Take a shower or bath the night before surgery. Use the soap your surgeon gave you to gently clean your skin. If you do not have soap from your surgeon, use your regular soap. Do not shave or scrub the surgery site.  Wear clean pajamas and have clean sheets on your bed.

## 2020-04-17 NOTE — PROGRESS NOTES
[facility]  [address] [facility] [id number] [phone]  Dept: [phone]    PRE-OP EVALUATION:  Today's date: 2020    Jayro Elder (: 1950) presents for pre-operative evaluation assessment as requested by Dr. Bhagat.  He requires evaluation and anesthesia risk assessment prior to undergoing surgery/procedure for treatment of left second toe amputation.    Proposed Surgery/ Procedure: Left second toe amputation   Date of Surgery/ Procedure: 2020  Time of Surgery/ Procedure: TBD  Hospital/Surgical Facility:  OR  Primary Physician: Davion Cordova  Type of Anesthesia Anticipated: Monitor Anesthesia Care     Patient has a Health Care Directive or Living Will:  NO    1. Yes - Do you have a history of heart attack, stroke, stent, bypass or surgery on an artery in the head, neck, heart or legs? See below  2. Yes - Do you ever have any pain or discomfort in your chest? See below  3. Yes - Do you have a history of  Heart Failure? See below  4. Yes - Are you troubled by shortness of breath when: walking on the level, up a slight hill or at night? See below  5. NO - Do you currently have a cold, bronchitis or other respiratory infection?  6. NO - Do you have a cough, shortness of breath or wheezing?  7. NO - Do you sometimes get pains in the calves of your legs when you walk?  8. Yes - Do you or anyone in your family have previous history of blood clots?  9. NO - Do you or does anyone in your family have a serious bleeding problem such as prolonged bleeding following surgeries or cuts?  10. NO - Have you ever had problems with anemia or been told to take iron pills?  11. Yes - Have you had any abnormal blood loss such as black, tarry or bloody stools, or abnormal vaginal bleeding?   12. Yes - Have you ever had a blood transfusion?   13. NO - Have you or any of your relatives ever had problems with anesthesia?  14. Yes - Do you have sleep  "apnea, excessive snoring or daytime drowsiness? UNtreated sleep apnea  15. No - Do you have any prosthetic heart valves?  16. NO - Do you have prosthetic joints?  17. NO - Is there any chance that you may be pregnant?      HPI:     HPI related to upcoming procedure: Patient with a PMHx including DM2, hypothyroidism, hypertension, CVA, untreated MARISOL, and renal dysfunction. He also has chronic afib (on AC), and known coronary disease with prior MI/stenting and resultant ischemic CMO with chronic EF of 35-40%. This is a brief summary composed by cardiology team. I do not see Jayro regularly.     Regarding his toe, He first noted some blood coming from the toe around one week ago  He had noted increased pain prior to that; toe/foot has \"felt like its going to split open for awhile\"; \"wasn't getting red though so I didn't think I had to do anything\"  Has not felt feverish; chills or body ache  Ambulation is much more difficult given pain  He does have a fair bit of peripheral neuropathy      Additionally, he has had a number of recent CVA per his report. Confirmed on MRI with his neurology team. Last seen a few weeks ago with Dr. Berry ([facility]) - no medication changes were made; has follow up next week.  He additionally notes persistent dizziness, cloudiness for a number of months. THinks this is why neurology perfomred the MRI initially  He is unsure if it was thought that the symptoms were CVA related. He does not think the symptoms coincided with the initiation of gabapentin.    He had some episodes of hypotension; ultimately, after some weight gain, he self-readministered lasix and spironolactone. He has NOT restarted lisinopril. No further episodes of hypotension.   Holter monitor showed chronic a fib with HR abg about 88      He has been having some increased chest symptoms over time  It is pressure and sharp pains; cant catch all his air   His cardiologist has ordered a MySupportAssistant      MEDICAL " HISTORY:     Patient Active Problem List    Diagnosis Date Noted     H/O amputation of lesser toe, right (H) 01/09/2020     Priority: Medium     Atrial fibrillation, unspecified type (H) 01/09/2020     Priority: Medium     Hypertension 07/15/2019     Priority: Medium     Unstable angina (H) 07/06/2019     Priority: Medium     Acute chest pain 07/05/2019     Priority: Medium     Post-operative state 05/06/2019     Priority: Medium     Acute osteomyelitis (H) 05/05/2019     Priority: Medium     History of CVA (cerebrovascular accident) 02/05/2019     Priority: Medium     CHF (congestive heart failure) (H) 01/17/2019     Priority: Medium     Bacteremia 09/14/2018     Priority: Medium     Cellulitis 09/06/2018     Priority: Medium     Pressure ulcer of toe of right foot, stage 3 (H) 06/22/2018     Priority: Medium     Cellulitis of left lower extremity 03/13/2018     Priority: Medium     Syncope, unspecified syncope type 03/13/2018     Priority: Medium     Diabetic ulcer of left midfoot associated with type 2 diabetes mellitus, with fat layer exposed (H) 03/13/2018     Priority: Medium     Type 2 diabetes mellitus with diabetic peripheral angiopathy without gangrene (H) 03/13/2018     Priority: Medium     Microalbuminuria 03/13/2018     Priority: Medium     X1       Homonymous hemianopsia, right 12/20/2017     Priority: Medium     Chronic atrial fibrillation 06/22/2017     Priority: Medium     Status post coronary angiogram 06/13/2017     Priority: Medium     Coronary artery disease involving native coronary artery of native heart with angina pectoris (H) 06/09/2017     Priority: Medium     CVA (cerebral vascular accident) (H) 04/21/2017     Priority: Medium     Cardiomyopathy (H)      Priority: Medium     Health Care Home 11/18/2013     Priority: Medium       Status:  NA -no care coordination needs 11/19/13  Care Coordinator:  Lashaun Arrington -[phone]  See Letters for [facility] Care Plan           Hyperlipidemia  05/11/2011     Priority: Medium     Hypothyroidism 05/11/2011     Priority: Medium     Overview:   Hypothyroidism Acquired       Non-toxic nodular goiter 05/11/2011     Priority: Medium     Overview:   LW Modifier:  biopsy benign x 2  ; Goiter Nodular       CARDIOVASCULAR SCREENING; LDL GOAL LESS THAN 100 10/31/2010     Priority: Medium     Diabetes mellitus, type 2 (H) 07/14/2005     Priority: Medium     Problem list name updated by automated process. Provider to review       Cardiovascular disease 07/14/2005     Priority: Medium     anterior MI,  PTCA and stent placed in mid LAD  Problem list name updated by automated process. 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     Cardiomyopathy (H)      Cellulitis of left lower extremity 3/13/2018     Cerebral infarction (H)     eye sight decreased in peripheral of right side and blurry     Diabetic ulcer of left midfoot associated with type 2 diabetes mellitus, with fat layer exposed (H) 3/13/2018     Essential hypertension, benign 11/13/2002     Generalized osteoarthrosis, unspecified site 11/13/2002     Microalbuminuria 3/13/2018    X1     Myocardial infarction (H)      Neuropathy      Sepsis (H)      Sleep apnea     doesn't use it     Substance abuse (H)      Syncope, unspecified syncope type 3/13/2018     Thyroid disease     takes medicine     Tobacco use disorder 11/13/2002     Type 2 diabetes mellitus  with diabetic peripheral angiopathy without gangrene, unspecified long term insulin use status 2005     Past Surgical History:   Procedure Laterality Date     AMPUTATE TOE(S) Right 1/6/2019    Procedure: Right open second toe partial amputation;  Surgeon: Sabino Garcia DPM;  Location: [facility] OR     AMPUTATE TOE(S) Right 1/8/2019    Procedure: 1.  Revision right second toe amputation with resection of distal half of proximal phalanx with full closure.    2.  Debridement of callus/preulcerative lesion, distal left second toe and plantar left first metatarsophalangeal joint.;  Surgeon: Ryan Bhagat DPM;  Location: [facility] OR     AMPUTATE TOE(S) Right 3/12/2019    Procedure: Partial right fourth toe amputation.;  Surgeon: Ryan Bhagat DPM;  Location: [facility] OR     AMPUTATE TOE(S) Right 5/6/2019    Procedure: Right partial third toe amputation;  Surgeon: Татьяна Mckeon DPM, Podiatry/Foot and Ankle Surgery;  Location: [facility] OR     ANGIOGRAM  6/29/05    subtotal occ.mid LAD,SUSAN mid LAD     ANGIOGRAM  7/05    mild CAD,patent stent,no flow-limiting lesions,sev.LV dysf.LAD enlarged     ANGIOGRAM  2/09    Sev.single vessel disease,Mod LV dysf.distal LAD 90%,70-75% mid lad just before prev stent,SUSAN to prox.mid LAD, endeavor to distal LAD     ANGIOGRAM  11/13/13    restenosis, stent LAD     BACK SURGERY      back surgery x3     C NONSPECIFIC PROCEDURE      Laminectomy x 3 - (1983 x 2 & 1990)     C NONSPECIFIC PROCEDURE Bilateral 1998    Bunionectomy     C NONSPECIFIC PROCEDURE  1959    Gingival surgery at age 9     CV CORONARY ANGIOGRAM N/A 7/8/2019    Procedure: Coronary Angiogram;  Surgeon: Jose Alfredo Crockett MD;  Location:  HEART CARDIAC CATH LAB     CV LEFT HEART CATH N/A 7/8/2019    Procedure: Left Heart Cath;  Surgeon: Jose Alfredo Crockett MD;  Location:  HEART CARDIAC CATH LAB     CV PCI ASPIRATION THROMECTOMY N/A 7/8/2019    Procedure: PCI Aspiration Thrombectomy;  Surgeon: Jose Alfredo Crockett MD;  Location:   HEART CARDIAC CATH LAB     CV PCI STENT DRUG ELUTING N/A 7/8/2019    Procedure: PCI Stent Drug Eluting;  Surgeon: Jose Alfredo Crockett MD;  Location: [facility]ARD[facility]     HEART CATH LEFT HEART CATH  06/13/2017    SUSAN to OM3     INCISION AND DRAINAGE TOE, COMBINED Bilateral 9/11/2018    Procedure: COMBINED INCISION AND DRAINAGE TOE;  1) Irrigation and debridement ulcer left great toe  2) Amputation 3rd toe right foot at distal interphalangeal joint level;  Surgeon: Miki Harp MD;  Location: [facility] OR     IRRIGATION AND DEBRIDEMENT FOOT, COMBINED Left 3/12/2019    Procedure: Debridement of left foot ulcer sub first MPJ 2 x 2.5 cm down to and including subcutaneous tissue, callus debridement for preulcerative lesion, left second toe.;  Surgeon: Ryan Bhagat DPM;  Location: [facility] OR     ORTHOPEDIC SURGERY      bunion surgery both feet     ORTHOPEDIC SURGERY      left shoulder     SOFT TISSUE SURGERY      debridement of toe numerous time     VASCULAR SURGERY      7 stents in heart     Current Outpatient Medications   Medication Sig Dispense Refill     amoxicillin-clavulanate (AUGMENTIN) 875-125 MG tablet Take 1 tablet by mouth 2 times daily for 10 days 20 tablet 0     apixaban ANTICOAGULANT (ELIQUIS) 5 MG tablet Take 1 tablet (5 mg) by mouth 2 times daily       atorvastatin (LIPITOR) 40 MG tablet Take 1 tablet (40 mg) by mouth every evening 90 tablet 0     carvedilol (COREG) 25 MG tablet Take half tablet (12.5mg) twice daily as long as systolic BP is >110mmHg. 180 tablet 0     Cholecalciferol (VITAMIN D3) 2000 units TABS Take 1 tablet by mouth daily       clopidogrel (PLAVIX) 75 MG tablet Take 1 tablet (75 mg) by mouth daily 90 tablet 3     furosemide (LASIX) 40 MG tablet Take 1 tablet (40 mg) by mouth daily 90 tablet 3     gabapentin (NEURONTIN) 100 MG capsule Take 2 capsules (200 mg) by mouth At Bedtime 180 capsule 0     insulin aspart (NOVOLOG FLEXPEN) 100 UNIT/ML pen Inject 10 Units Subcutaneous  daily (with dinner)       insulin aspart (NOVOLOG FLEXPEN) 100 UNIT/ML pen Inject Subcutaneous 3 times daily (with meals) Sliding Scale  Do not give sliding scale insulin if Pre-Meal BG less than 140.   For Pre-Meal:   - 200 give 2 units.    - 250 give 4 units.    - 300 give 6 units.    - 350 give 8 units.    - 400 give 10 units.       insulin aspart (NOVOLOG PEN) 100 UNIT/ML pen Inject 8 Units Subcutaneous daily (with breakfast)        insulin glargine (LANTUS SOLOSTAR PEN) 100 UNIT/ML pen Inject 36 Units Subcutaneous every morning       insulin pen needle 31G X 6 MM Use  as directed. 100 each prn     ASPIRIN NOT PRESCRIBED (INTENTIONAL) Antiplatelet medication not prescribed intentionally due to Current anticoagulant therapy (warfarin/enoxaparin) (Patient not taking: Reported on 4/17/2020)       fluticasone (FLONASE) 50 MCG/ACT nasal spray Spray 2 sprays into both nostrils daily (Patient not taking: Reported on 4/17/2020) 18.2 mL 1     insulin aspart (NOVOLOG FLEXPEN) 100 UNIT/ML pen Inject 8 Units Subcutaneous daily (with lunch)       isosorbide mononitrate (IMDUR) 60 MG 24 hr tablet Take 2 tablets (120 mg) by mouth daily 90 tablet 0     levothyroxine (SYNTHROID, LEVOTHROID) 137 MCG tablet Take 137 mcg by mouth daily  90 tablet 3     nitroglycerin (NITROSTAT) 0.4 MG sublingual tablet Place 1 tablet (0.4 mg) under the tongue every 5 minutes as needed for chest pain 50 tablet 0     NONFORMULARY Topical patch for diabetes (blood glucose control) - changes every 4 days or so       order for DME Equipment being ordered: size 12 surgical shoe 1 Device 0     order for DME Equipment being ordered: Walker Wheels () and Walker ()  Treatment Diagnosis: Impaired gait, heel WB bilaterally. (Patient not taking: Reported on 3/20/2020) 1 each 0     pantoprazole (PROTONIX) 40 MG enteric coated tablet Take 1 tablet (40 mg) by mouth every morning (before breakfast) 30 tablet 0      spironolactone (ALDACTONE) 25 MG tablet Take 1 tablet (25 mg) by mouth daily       OTC products: None, except as noted above    Allergies   Allergen Reactions     No Known Allergies       Latex Allergy: NO    Social History     Tobacco Use     Smoking status: Former Smoker     Packs/day: 1.00     Years: 25.00     Pack years: 25.00     Types: Cigarettes     Last attempt to quit: 2005     Years since quittin.7     Smokeless tobacco: Never Used   Substance Use Topics     Alcohol use: No     Alcohol/week: 4.0 standard drinks     Types: 4 Shots of liquor per week     Frequency: Never     History   Drug Use No       REVIEW OF SYSTEMS:   CONSTITUTIONAL: NEGATIVE for fever, chills, change in weight  ENT/MOUTH: NEGATIVE for ear, mouth and throat problems  RESP: NEGATIVE for significant cough or SOB  CV: POSITIVE for chest pain/chest pressure, chronic lately  MUSCULOSKELETAL: POSITIVE  for left toe pain and wound  NEURO: POSITIVE for peripheral neuropathy    EXAM:   Pulse 80   Temp 97.8  F (36.6  C) (Oral)   Wt 117.2 kg (258 lb 6.4 oz)   SpO2 99%   BMI 31.45 kg/m      GENERAL APPEARANCE: alert and no distress     EYES: EOMI,  PERRL     NECK: no carotid bruit     RESP: lungs clear to auscultation - no rales, rhonchi or wheezes     CV: irregularly irregular rhythm     MS: I did not remove his left foot dressing/wrap. There is 1+ edema in bilateral lower extremities. They are tender to touch but not warm or erythematous     PSYCH: mentation appears normal. and affect normal/bright    DIAGNOSTICS:   See labs from today in EPIC    Recent Labs   Lab Test 20  1245 19  0939 10/09/19  1230 07/15/19  0449  19  1015  19  0218  19  0600   HGB 13.0*  --  13.0* 10.7*   < > 12.1*   < > 11.7*   < >  --    PLT  --   --  255 199   < > 226   < > 223   < >  --    INR  --   --   --   --   --   --   --  1.34*  --  1.43*    137 132* 141   < >  --    < > 137   < > 138   POTASSIUM 4.2 4.0 5.2 3.6   <  >  --    < > 3.9   < > 3.7   CR 1.14 1.17 1.44* 0.99   < >  --    < > 1.28*   < > 2.94*   A1C  --   --   --   --   --  8.8*  --  9.0*  --   --     < > = values in this interval not displayed.        IMPRESSION:   Reason for surgery/procedure: gas gangrene of lower extremity (left)  Diagnosis/reason for consult: pre-operative consult    The proposed surgical procedure is considered INTERMEDIATE risk.    REVISED CARDIAC RISK INDEX  The patient has the following serious cardiovascular risks for perioperative complications such as (MI, PE, VFib and 3  AV Block):  Coronary Artery Disease (MI, positive stress test, angina, Qs on EKG)  Congestive Heart Failure (pulmonary edema, PND, s3 moises, CXR with pulmonary congestion, basilar rales)  Cerebrovascular Disease (TIA or CVA)  Diabetes Mellitus (on Insulin)  INTERPRETATION: 3 risks: Class IV (high risk - >11% complication rate)    The patient has the following additional risks for perioperative complications:  Perioperative Eliquis; Uncontrolled chronic morbidity      ICD-10-CM    1. Preop general physical exam  Z01.818 Hemoglobin   2. Type 2 diabetes mellitus with diabetic nephropathy, with long-term current use of insulin (H)  E11.21 Hemoglobin A1c    Z79.4    3. Type 2 diabetes mellitus with diabetic peripheral angiopathy without gangrene, with long-term current use of insulin (H)  E11.51     Z79.4    4. Atrial fibrillation, unspecified type (H)  I48.91    5. Coronary artery disease involving native coronary artery of native heart with angina pectoris (H)  I25.119    6. History of CVA (cerebrovascular accident)  Z86.73 Hemoglobin       RECOMMENDATIONS:     --Consult hospital rounder / IM to assist post-op medical management    Cardiovascular Risk  Performs 4 METs exercise without symptoms (Light housework (dusting, washing dishes)) .       Obstructive Sleep Apnea (or suspected sleep apnea)  Hx of MARISOL; treatment refusal      --Patient is to take all scheduled medications  on the day of surgery EXCEPT for modifications listed below.    Diabetes Medication Use  Take without change; he has very poor control    Anticoagulant or Antiplatelet Medication Use  ELIQUIS: Bleeding risk is low for this procedure and apixaban (Eliquis) should be stopped at least 24 hours prior to procedure based on a creatinine clearance of  greater than or equal to 30. RESTART per ortho/podiatry       Careful review of cardiac hx. Unfortunately despite need for lexiscan this surgery cannot wait. He can accomplish 4 mets. He can perform the scan the following week. Thrombus risk is low with eliquis stoppage; he has already taken morning dose so will hold both tonight and tomorrow. Restart per ortho depending on hemostasis. Will hold diuretics to ensure excellent perfusion. Continue BB and imdur. Regarding dizziness he has neuro follow up but duc asked him to STOP his gabapentin as trial.     APPROVAL GIVEN to proceed with proposed procedure, without further diagnostic evaluation       Signed Electronically by: Davion Cordova PA-C    Copy of this evaluation report is provided to requesting physician.    [facility] Preop Guidelines    Revised Cardiac Risk Index

## 2020-04-18 ENCOUNTER — HOSPITAL ENCOUNTER (OUTPATIENT)
Facility: CLINIC | Age: 70
Discharge: HOME | End: 2020-04-18
Attending: PODIATRIST | Admitting: PODIATRIST
Payer: COMMERCIAL

## 2020-04-18 ENCOUNTER — APPOINTMENT (OUTPATIENT)
Dept: GENERAL RADIOLOGY | Facility: CLINIC | Age: 70
End: 2020-04-18
Attending: PODIATRIST
Payer: COMMERCIAL

## 2020-04-18 ENCOUNTER — ANESTHESIA EVENT (OUTPATIENT)
Dept: SURGERY | Facility: CLINIC | Age: 70
End: 2020-04-18
Payer: COMMERCIAL

## 2020-04-18 ENCOUNTER — ANESTHESIA (OUTPATIENT)
Dept: SURGERY | Facility: CLINIC | Age: 70
End: 2020-04-18
Payer: COMMERCIAL

## 2020-04-18 VITALS
WEIGHT: 256.5 LBS | HEART RATE: 66 BPM | HEIGHT: 76 IN | TEMPERATURE: 97.6 F | SYSTOLIC BLOOD PRESSURE: 147 MMHG | DIASTOLIC BLOOD PRESSURE: 91 MMHG | RESPIRATION RATE: 10 BRPM | OXYGEN SATURATION: 100 % | BODY MASS INDEX: 31.24 KG/M2

## 2020-04-18 DIAGNOSIS — Z98.890 S/P FOOT SURGERY, LEFT: Primary | ICD-10-CM

## 2020-04-18 LAB
ANION GAP SERPL CALCULATED.3IONS-SCNC: 7 MMOL/L (ref 3–14)
BUN SERPL-MCNC: 21 MG/DL (ref 7–30)
CALCIUM SERPL-MCNC: 8.6 MG/DL (ref 8.5–10.1)
CHLORIDE SERPL-SCNC: 103 MMOL/L (ref 94–109)
CO2 SERPL-SCNC: 25 MMOL/L (ref 20–32)
CREAT SERPL-MCNC: 1.2 MG/DL (ref 0.66–1.25)
GFR SERPL CREATININE-BSD FRML MDRD: 61 ML/MIN/{1.73_M2}
GLUCOSE BLDC GLUCOMTR-MCNC: 232 MG/DL (ref 70–99)
GLUCOSE BLDC GLUCOMTR-MCNC: 243 MG/DL (ref 70–99)
GLUCOSE SERPL-MCNC: 275 MG/DL (ref 70–99)
HGB BLD-MCNC: 11.5 G/DL (ref 13.3–17.7)
POTASSIUM SERPL-SCNC: 4.1 MMOL/L (ref 3.4–5.3)
SODIUM SERPL-SCNC: 135 MMOL/L (ref 133–144)

## 2020-04-18 PROCEDURE — 88300 SURGICAL PATH GROSS: CPT | Performed by: PODIATRIST

## 2020-04-18 PROCEDURE — 87075 CULTR BACTERIA EXCEPT BLOOD: CPT | Performed by: PODIATRIST

## 2020-04-18 PROCEDURE — 87070 CULTURE OTHR SPECIMN AEROBIC: CPT | Performed by: PODIATRIST

## 2020-04-18 PROCEDURE — 27110028 ZZH OR GENERAL SUPPLY NON-STERILE: Performed by: PODIATRIST

## 2020-04-18 PROCEDURE — 25800030 ZZH RX IP 258 OP 636: Performed by: ANESTHESIOLOGY

## 2020-04-18 PROCEDURE — 25000125 ZZHC RX 250: Performed by: NURSE ANESTHETIST, CERTIFIED REGISTERED

## 2020-04-18 PROCEDURE — 93005 ELECTROCARDIOGRAM TRACING: CPT

## 2020-04-18 PROCEDURE — 25000128 H RX IP 250 OP 636: Performed by: PODIATRIST

## 2020-04-18 PROCEDURE — 36415 COLL VENOUS BLD VENIPUNCTURE: CPT | Performed by: ANESTHESIOLOGY

## 2020-04-18 PROCEDURE — 25000132 ZZH RX MED GY IP 250 OP 250 PS 637: Performed by: PODIATRIST

## 2020-04-18 PROCEDURE — 87176 TISSUE HOMOGENIZATION CULTR: CPT | Performed by: PODIATRIST

## 2020-04-18 PROCEDURE — 25000128 H RX IP 250 OP 636: Performed by: NURSE ANESTHETIST, CERTIFIED REGISTERED

## 2020-04-18 PROCEDURE — 40000169 ZZH STATISTIC PRE-PROCEDURE ASSESSMENT I: Performed by: PODIATRIST

## 2020-04-18 PROCEDURE — 37000008 ZZH ANESTHESIA TECHNICAL FEE, 1ST 30 MIN: Performed by: PODIATRIST

## 2020-04-18 PROCEDURE — 80048 BASIC METABOLIC PNL TOTAL CA: CPT | Performed by: ANESTHESIOLOGY

## 2020-04-18 PROCEDURE — 93010 ELECTROCARDIOGRAM REPORT: CPT | Performed by: INTERNAL MEDICINE

## 2020-04-18 PROCEDURE — 36000052 ZZH SURGERY LEVEL 2 EA 15 ADDTL MIN: Performed by: PODIATRIST

## 2020-04-18 PROCEDURE — 25000125 ZZHC RX 250: Performed by: PODIATRIST

## 2020-04-18 PROCEDURE — 87077 CULTURE AEROBIC IDENTIFY: CPT | Performed by: PODIATRIST

## 2020-04-18 PROCEDURE — 84132 ASSAY OF SERUM POTASSIUM: CPT | Performed by: ANESTHESIOLOGY

## 2020-04-18 PROCEDURE — 40000065 ZZH STATISTIC EKG NON-CHARGEABLE

## 2020-04-18 PROCEDURE — 87076 CULTURE ANAEROBE IDENT EACH: CPT | Performed by: PODIATRIST

## 2020-04-18 PROCEDURE — 27210794 ZZH OR GENERAL SUPPLY STERILE: Performed by: PODIATRIST

## 2020-04-18 PROCEDURE — 71000012 ZZH RECOVERY PHASE 1 LEVEL 1 FIRST HR: Performed by: PODIATRIST

## 2020-04-18 PROCEDURE — 71000013 ZZH RECOVERY PHASE 1 LEVEL 1 EA ADDTL HR: Performed by: PODIATRIST

## 2020-04-18 PROCEDURE — 88300 SURGICAL PATH GROSS: CPT | Mod: 26 | Performed by: PODIATRIST

## 2020-04-18 PROCEDURE — 82962 GLUCOSE BLOOD TEST: CPT

## 2020-04-18 PROCEDURE — 85018 HEMOGLOBIN: CPT | Performed by: ANESTHESIOLOGY

## 2020-04-18 PROCEDURE — 28825 PARTIAL AMPUTATION OF TOE: CPT | Mod: T1 | Performed by: PODIATRIST

## 2020-04-18 PROCEDURE — 36000050 ZZH SURGERY LEVEL 2 1ST 30 MIN: Performed by: PODIATRIST

## 2020-04-18 PROCEDURE — 71000027 ZZH RECOVERY PHASE 2 EACH 15 MINS: Performed by: PODIATRIST

## 2020-04-18 PROCEDURE — 40000985 XR FOOT PORT LT 2 VW: Mod: LT

## 2020-04-18 PROCEDURE — 87186 SC STD MICRODIL/AGAR DIL: CPT | Performed by: PODIATRIST

## 2020-04-18 PROCEDURE — 37000009 ZZH ANESTHESIA TECHNICAL FEE, EACH ADDTL 15 MIN: Performed by: PODIATRIST

## 2020-04-18 RX ORDER — SODIUM CHLORIDE, SODIUM LACTATE, POTASSIUM CHLORIDE, CALCIUM CHLORIDE 600; 310; 30; 20 MG/100ML; MG/100ML; MG/100ML; MG/100ML
INJECTION, SOLUTION INTRAVENOUS CONTINUOUS
Status: DISCONTINUED | OUTPATIENT
Start: 2020-04-18 | End: 2020-04-18 | Stop reason: HOSPADM

## 2020-04-18 RX ORDER — IBUPROFEN 600 MG/1
TABLET, FILM COATED ORAL
Qty: 30 TABLET | Refills: 0 | Status: SHIPPED | OUTPATIENT
Start: 2020-04-18 | End: 2020-04-27

## 2020-04-18 RX ORDER — HYDROCODONE BITARTRATE AND ACETAMINOPHEN 5; 325 MG/1; MG/1
TABLET ORAL
Qty: 15 TABLET | Refills: 0 | Status: SHIPPED | OUTPATIENT
Start: 2020-04-18 | End: 2020-05-06

## 2020-04-18 RX ORDER — NALOXONE HYDROCHLORIDE 0.4 MG/ML
.1-.4 INJECTION, SOLUTION INTRAMUSCULAR; INTRAVENOUS; SUBCUTANEOUS
Status: DISCONTINUED | OUTPATIENT
Start: 2020-04-18 | End: 2020-04-18 | Stop reason: HOSPADM

## 2020-04-18 RX ORDER — PROPOFOL 10 MG/ML
INJECTION, EMULSION INTRAVENOUS PRN
Status: DISCONTINUED | OUTPATIENT
Start: 2020-04-18 | End: 2020-04-18

## 2020-04-18 RX ORDER — OXYCODONE HYDROCHLORIDE 5 MG/1
5 TABLET ORAL
Status: DISCONTINUED | OUTPATIENT
Start: 2020-04-18 | End: 2020-04-18 | Stop reason: HOSPADM

## 2020-04-18 RX ORDER — BUPIVACAINE HYDROCHLORIDE 2.5 MG/ML
INJECTION, SOLUTION INFILTRATION; PERINEURAL PRN
Status: DISCONTINUED | OUTPATIENT
Start: 2020-04-18 | End: 2020-04-18 | Stop reason: HOSPADM

## 2020-04-18 RX ORDER — PROPOFOL 10 MG/ML
INJECTION, EMULSION INTRAVENOUS CONTINUOUS PRN
Status: DISCONTINUED | OUTPATIENT
Start: 2020-04-18 | End: 2020-04-18

## 2020-04-18 RX ORDER — HYDROMORPHONE HYDROCHLORIDE 1 MG/ML
.3-.5 INJECTION, SOLUTION INTRAMUSCULAR; INTRAVENOUS; SUBCUTANEOUS EVERY 5 MIN PRN
Status: DISCONTINUED | OUTPATIENT
Start: 2020-04-18 | End: 2020-04-18 | Stop reason: HOSPADM

## 2020-04-18 RX ORDER — ACETAMINOPHEN 325 MG/1
325-650 TABLET ORAL EVERY 6 HOURS PRN
Status: ON HOLD | COMMUNITY
End: 2020-04-18

## 2020-04-18 RX ORDER — HYDROCODONE BITARTRATE AND ACETAMINOPHEN 5; 325 MG/1; MG/1
1 TABLET ORAL ONCE
Status: COMPLETED | OUTPATIENT
Start: 2020-04-18 | End: 2020-04-18

## 2020-04-18 RX ORDER — CLINDAMYCIN PHOSPHATE 900 MG/50ML
900 INJECTION, SOLUTION INTRAVENOUS
Status: DISCONTINUED | OUTPATIENT
Start: 2020-04-18 | End: 2020-04-18 | Stop reason: HOSPADM

## 2020-04-18 RX ORDER — CLINDAMYCIN PHOSPHATE 900 MG/50ML
900 INJECTION, SOLUTION INTRAVENOUS SEE ADMIN INSTRUCTIONS
Status: DISCONTINUED | OUTPATIENT
Start: 2020-04-18 | End: 2020-04-18 | Stop reason: HOSPADM

## 2020-04-18 RX ORDER — ONDANSETRON 2 MG/ML
INJECTION INTRAMUSCULAR; INTRAVENOUS PRN
Status: DISCONTINUED | OUTPATIENT
Start: 2020-04-18 | End: 2020-04-18

## 2020-04-18 RX ORDER — ONDANSETRON 2 MG/ML
4 INJECTION INTRAMUSCULAR; INTRAVENOUS EVERY 30 MIN PRN
Status: DISCONTINUED | OUTPATIENT
Start: 2020-04-18 | End: 2020-04-18 | Stop reason: HOSPADM

## 2020-04-18 RX ORDER — FENTANYL CITRATE 50 UG/ML
25-50 INJECTION, SOLUTION INTRAMUSCULAR; INTRAVENOUS
Status: DISCONTINUED | OUTPATIENT
Start: 2020-04-18 | End: 2020-04-18 | Stop reason: HOSPADM

## 2020-04-18 RX ORDER — ONDANSETRON 4 MG/1
4 TABLET, ORALLY DISINTEGRATING ORAL EVERY 30 MIN PRN
Status: DISCONTINUED | OUTPATIENT
Start: 2020-04-18 | End: 2020-04-18 | Stop reason: HOSPADM

## 2020-04-18 RX ORDER — FENTANYL CITRATE 50 UG/ML
INJECTION, SOLUTION INTRAMUSCULAR; INTRAVENOUS PRN
Status: DISCONTINUED | OUTPATIENT
Start: 2020-04-18 | End: 2020-04-18

## 2020-04-18 RX ORDER — HYDROXYZINE HYDROCHLORIDE 10 MG/1
TABLET, FILM COATED ORAL
Qty: 15 TABLET | Refills: 0 | Status: SHIPPED | OUTPATIENT
Start: 2020-04-18 | End: 2020-05-06

## 2020-04-18 RX ORDER — LIDOCAINE HYDROCHLORIDE 20 MG/ML
INJECTION, SOLUTION INFILTRATION; PERINEURAL PRN
Status: DISCONTINUED | OUTPATIENT
Start: 2020-04-18 | End: 2020-04-18

## 2020-04-18 RX ADMIN — ONDANSETRON 4 MG: 2 INJECTION INTRAMUSCULAR; INTRAVENOUS at 09:19

## 2020-04-18 RX ADMIN — SODIUM CHLORIDE, POTASSIUM CHLORIDE, SODIUM LACTATE AND CALCIUM CHLORIDE: 600; 310; 30; 20 INJECTION, SOLUTION INTRAVENOUS at 08:55

## 2020-04-18 RX ADMIN — PROPOFOL 100 MCG/KG/MIN: 10 INJECTION, EMULSION INTRAVENOUS at 09:01

## 2020-04-18 RX ADMIN — CLINDAMYCIN PHOSPHATE 900 MG: 900 INJECTION, SOLUTION INTRAVENOUS at 09:01

## 2020-04-18 RX ADMIN — LIDOCAINE HYDROCHLORIDE 40 MG: 20 INJECTION, SOLUTION INFILTRATION; PERINEURAL at 09:00

## 2020-04-18 RX ADMIN — PROPOFOL 30 MG: 10 INJECTION, EMULSION INTRAVENOUS at 09:01

## 2020-04-18 RX ADMIN — HYDROCODONE BITARTRATE AND ACETAMINOPHEN 1 TABLET: 5; 325 TABLET ORAL at 10:56

## 2020-04-18 RX ADMIN — FENTANYL CITRATE 25 MCG: 50 INJECTION, SOLUTION INTRAMUSCULAR; INTRAVENOUS at 08:56

## 2020-04-18 RX ADMIN — FENTANYL CITRATE 25 MCG: 50 INJECTION, SOLUTION INTRAMUSCULAR; INTRAVENOUS at 08:59

## 2020-04-18 RX ADMIN — MIDAZOLAM 1 MG: 1 INJECTION INTRAMUSCULAR; INTRAVENOUS at 08:56

## 2020-04-18 SDOH — HEALTH STABILITY: MENTAL HEALTH: CURRENT SMOKER: 0

## 2020-04-18 ASSESSMENT — COPD QUESTIONNAIRES
CAT_SEVERITY: MILD
COPD: 1

## 2020-04-18 ASSESSMENT — ENCOUNTER SYMPTOMS: DYSRHYTHMIAS: 1

## 2020-04-18 ASSESSMENT — LIFESTYLE VARIABLES: TOBACCO_USE: 1

## 2020-04-18 ASSESSMENT — MIFFLIN-ST. JEOR: SCORE: 2029.98

## 2020-04-18 NOTE — ANESTHESIA CARE TRANSFER NOTE
Patient: Jayro Elder    Procedure(s):  LEFT SECOND TOE AMPUTATION    Diagnosis: Gas gangrene (H) [A48.0]  Diagnosis Additional Information: No value filed.    Anesthesia Type:   MAC     Note:  Airway :Room Air  Patient transferred to:PACU  Comments: At end of procedure, spontaneous respirations, patient alert to voice, able to follow commands. Patient breathing room air at room air to PACU. SpO2, NiBP, and EKG monitors and alarms on and functioning, Breanne Hugger warmer connected to patient gown, report on patient's clinical status given to PACU RN, RN questions answered.  Handoff Report: Identifed the Patient, Identified the Reponsible Provider, Reviewed the pertinent medical history, Discussed the surgical course, Reviewed Intra-OP anesthesia mangement and issues during anesthesia, Set expectations for post-procedure period and Allowed opportunity for questions and acknowledgement of understanding      Vitals: (Last set prior to Anesthesia Care Transfer)    CRNA VITALS  4/18/2020 0905 - 4/18/2020 0943      4/18/2020             Pulse:  68    SpO2:  98 %    Resp Rate (set):  10                Electronically Signed By: LORRAINE Linares CRNA  April 18, 2020  9:43 AM

## 2020-04-18 NOTE — BRIEF OP NOTE
[facility]    Brief Operative Note    Pre-operative diagnosis: Gas gangrene (H) [A48.0]  Post-operative diagnosis sp left 2nd toe amputation    Procedure: Procedure(s):  LEFT SECOND TOE AMPUTATION  Surgeon: Surgeon(s) and Role:     * Ryan Bhagat DPM - Primary  Anesthesia: Monitor Anesthesia Care   Estimated blood loss: Less than 10 ml  Drains: None  Specimens:   ID Type Source Tests Collected by Time Destination   1 : LEFT SECOND TOE Tissue Toe ANAEROBIC BACTERIAL CULTURE, TISSUE CULTURE AEROBIC BACTERIAL Ryan Bhagat DPM 4/18/2020  9:15 AM    A : LEFT SECOND TOE Tissue Toe SURGICAL PATHOLOGY EXAM Ryan Bhagat DPM 4/18/2020  9:16 AM      Findings:   healthy tissue at amputation level.  Complications: None.  Implants: * No implants in log *

## 2020-04-18 NOTE — OR NURSING
Crutch education covered with pt. Pt demonstrates correct crutch walking. Crutches and ortho shoe sent home with pt. Discharge instructions read to pt wife over phone due to COVID precautions. Discharge paperwork sent home. Medications and narcotic with red seal intact given to wife. Escorted out to car via Wheelchair with RN

## 2020-04-18 NOTE — ANESTHESIA PREPROCEDURE EVALUATION
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     Cardiomyopathy (H)      Cellulitis of left lower extremity 3/13/2018     Cerebral infarction (H)     eye sight decreased in peripheral of right side and blurry     Diabetic ulcer of left midfoot associated with type 2 diabetes mellitus, with fat layer exposed (H) 3/13/2018     Essential hypertension, benign 2002     Generalized osteoarthrosis, unspecified site 2002     Microalbuminuria 3/13/2018    X1     Myocardial infarction (H)      Neuropathy      Sepsis (H)      Sleep apnea     doesn't use it     Substance abuse (H)      Syncope, unspecified syncope type 3/13/2018     Thyroid disease     takes medicine     Tobacco use disorder 2002     Type 2 diabetes mellitus with diabetic peripheral angiopathy without gangrene, unspecified long term insulin use status      Past Surgical History:   Procedure Laterality Date     AMPUTATE TOE(S) Right 2019    Procedure: Right open second toe partial amputation;  Surgeon: Sabino Garcia DPM;  Location: [facility] OR     AMPUTATE TOE(S) Right 2019    Procedure: 1.  Revision right second toe amputation with resection of distal half of proximal phalanx with full closure.    2.  Debridement of callus/preulcerative lesion, distal left second toe and plantar left first metatarsophalangeal joint.;  Surgeon: Ryan Bhagat DPM;  Location: [facility] OR     AMPUTATE TOE(S) Right 3/12/2019    Procedure: Partial right fourth toe amputation.;  Surgeon: Ryan Bhagat DPM;  Location: [facility]     AMPUTATE TOE(S) Right 2019    Procedure: Right partial third toe amputation;   Surgeon: Татьяна Mckeon DPM, Podiatry/Foot and Ankle Surgery;  Location: [facility] OR     ANGIOGRAM  6/29/05    subtotal occ.mid LAD,SUSAN mid LAD     ANGIOGRAM  7/05    mild CAD,patent stent,no flow-limiting lesions,sev.LV dysf.LAD enlarged     ANGIOGRAM  2/09    Sev.single vessel disease,Mod LV dysf.distal LAD 90%,70-75% mid lad just before prev stent,SUSAN to prox.mid LAD, endeavor to distal LAD     ANGIOGRAM  11/13/13    restenosis, stent LAD     BACK SURGERY      back surgery x3     C NONSPECIFIC PROCEDURE      Laminectomy x 3 - (1983 x 2 & 1990)     C NONSPECIFIC PROCEDURE Bilateral 1998    Bunionectomy     C NONSPECIFIC PROCEDURE  1959    Gingival surgery at age 9     CV CORONARY ANGIOGRAM N/A 7/8/2019    Procedure: Coronary Angiogram;  Surgeon: Jose Alfredo Crockett MD;  Location:  [facility]     CV LEFT HEART CATH N/A 7/8/2019    Procedure: Left Heart Cath;  Surgeon: Jose Alfredo Crockett MD;  Location:  [facility]     CV PCI ASPIRATION THROMECTOMY N/A 7/8/2019    Procedure: PCI Aspiration Thrombectomy;  Surgeon: Jose Alfredo Crockett MD;  Location:  [facility]     CV PCI STENT DRUG ELUTING N/A 7/8/2019    Procedure: PCI Stent Drug Eluting;  Surgeon: Jose Alfredo Crockett MD;  Location:  [facility]     HEART CATH LEFT HEART CATH  06/13/2017    SUSAN to OM3     INCISION AND DRAINAGE TOE, COMBINED Bilateral 9/11/2018    Procedure: COMBINED INCISION AND DRAINAGE TOE;  1) Irrigation and debridement ulcer left great toe  2) Amputation 3rd toe right foot at distal interphalangeal joint level;  Surgeon: Miki Harp MD;  Location:  OR     IRRIGATION AND DEBRIDEMENT FOOT, COMBINED Left 3/12/2019    Procedure: Debridement of left foot ulcer sub first MPJ 2 x 2.5 cm down to and including subcutaneous tissue, callus debridement for preulcerative lesion, left second toe.;  Surgeon: Ryan Bhagat DPM;  Location:  OR     ORTHOPEDIC SURGERY      bunion surgery both  feet     ORTHOPEDIC SURGERY      left shoulder     SOFT TISSUE SURGERY      debridement of toe numerous time     VASCULAR SURGERY      7 stents in heart       Anesthesia Evaluation     . Pt has had prior anesthetic.     No history of anesthetic complications          ROS/MED HX    ENT/Pulmonary:     (+)sleep apnea, tobacco use, Past use quit 2005 packs/day  mild COPD, doesn't use CPAP Non compliant cmH2O , . .    Neurologic:     (+)CVA with deficits- Homonymous hemianopsia, right,     Cardiovascular:     (+) Dyslipidemia, hypertension--CAD, angina-at rest, -stent (2009/2017/2019),10 Drug Eluting Stent .. : . CHF . . :. dysrhythmias a-fib, . Previous cardiac testing Echodate:7/2019results:   Interpretation Summary     The left ventricle is normal in size. Proximal septal thickening is noted.  Left ventricular systolic function is moderately reduced. The visual ejection  fraction is estimated at 35-40%. LVEF 36% based on biplane 2D tracing.  Diastolic function not assessed due to atrial fibrillation. There is akinesis  of the mid to apical anterior walls, the mid anteroseptal and apical septal  walls and the true LV apex. There is no thrombus seen in the left ventricle.  There is no thrombus seen in the left ventricle.  The right ventricle is normal size. Mildly decreased right ventricular  systolic function.  Trace to mild mitral and tricuspid regurgitation.  No pericardial effusion.  In comparison to the previous report dated 01/18/2019, the findings are  similar.date: results: date: results: date: results:          METS/Exercise Tolerance:     Hematologic:         Musculoskeletal:         GI/Hepatic:        (-) GERD   Renal/Genitourinary:         Endo:     (+) type II DM Last HgA1c: 10.4 date: 4/2020 Using insulin - not using insulin pump Diabetic complications: neuropathy, thyroid problem hypothyroidism, .      Psychiatric:         Infectious Disease:         Malignancy:         Other:                       "    Physical Exam      Airway   Mallampati: II  TM distance: >3 FB  Neck ROM: full    Dental   (+) partials    Cardiovascular   Rhythm and rate: irregular      Pulmonary    breath sounds clear to auscultation            Lab Results   Component Value Date    WBC 12.9 (H) 10/09/2019    HGB 12.5 (L) 04/17/2020    HCT 40.4 10/09/2019     10/09/2019    CRP 81.6 (H) 09/08/2018    SED 25 (H) 03/10/2019     03/24/2020    POTASSIUM 4.2 03/24/2020    CHLORIDE 104 03/24/2020    CO2 26 03/24/2020    BUN 25 03/24/2020    CR 1.14 03/24/2020     (H) 03/24/2020    BETTIE 8.7 03/24/2020    MAG 2.4 (H) 01/25/2019    ALBUMIN 3.3 (L) 01/17/2019    PROTTOTAL 7.5 01/17/2019    ALT 17 01/17/2019    AST 10 01/17/2019    ALKPHOS 77 01/17/2019    BILITOTAL 1.2 01/17/2019    LIPASE 23 11/02/2010    PTT 31 07/09/2019    INR 1.34 (H) 05/05/2019    TSH 1.80 10/09/2019       Preop Vitals  BP Readings from Last 3 Encounters:   04/17/20 118/74   04/16/20 130/74   03/20/20 93/71    Pulse Readings from Last 3 Encounters:   04/17/20 80   03/20/20 73   01/31/20 77      Resp Readings from Last 3 Encounters:   01/31/20 18   01/06/20 16   10/09/19 20    SpO2 Readings from Last 3 Encounters:   04/17/20 99%   01/31/20 97%   01/06/20 97%      Temp Readings from Last 1 Encounters:   04/17/20 36.6  C (97.8  F) (Oral)    Ht Readings from Last 1 Encounters:   04/16/20 1.93 m (6' 4\")      Wt Readings from Last 1 Encounters:   04/17/20 117.2 kg (258 lb 6.4 oz)    Estimated body mass index is 31.45 kg/m  as calculated from the following:    Height as of 4/16/20: 1.93 m (6' 4\").    Weight as of 4/17/20: 117.2 kg (258 lb 6.4 oz).       Anesthesia Plan      History & Physical Review  History and physical reviewed and following examination; no interval change.    ASA Status:  3 .    NPO Status:  > 8 hours    Plan for MAC with Intravenous induction. Reason for MAC:  Deep or markedly invasive procedure (G8)  PONV prophylaxis:  Ondansetron (or other " 5HT-3)  Patient advised that angina and poor DM control place him at anesthesia risk. His highest risk however is failure to treat his foot gangrene and we will proceed.   Patient advised of anesthesia risks with respect to untreated MARISOL.   He has a functional machine at home and is advised to use it for at least the next 7-10 days.    The patient is not a current smoker      Postoperative Care  Postoperative pain management:  Multi-modal analgesia.      Consents  Anesthetic plan, risks, benefits and alternatives discussed with:  Patient.  Use of blood products discussed: No .   .                 Anselmo Ragsdale MD

## 2020-04-18 NOTE — DISCHARGE INSTRUCTIONS
Same Day Surgery Discharge Instructions for  Sedation and General Anesthesia       It's not unusual to feel dizzy, light-headed or faint for up to 24 hours after surgery or while taking pain medication.  If you have these symptoms: sit for a few minutes before standing and have someone assist you when you get up to walk or use the bathroom.      You should rest and relax for the next 24 hours. We recommend you make arrangements to have an adult stay with you for at least 24 hours after your discharge.  Avoid hazardous and strenuous activity.      DO NOT DRIVE any vehicle or operate mechanical equipment for 24 hours following the end of your surgery.  Even though you may feel normal, your reactions may be affected by the medication you have received.      Do not drink alcoholic beverages for 24 hours following surgery.       Slowly progress to your regular diet as you feel able. It's not unusual to feel nauseated and/or vomit after receiving anesthesia.  If you develop these symptoms, drink clear liquids (apple juice, ginger ale, broth, 7-up, etc. ) until you feel better.  If your nausea and vomiting persists for 24 hours, please notify your surgeon.        All narcotic pain medications, along with inactivity and anesthesia, can cause constipation. Drinking plenty of liquids and increasing fiber intake will help.      For any questions of a medical nature, call your surgeon.      Do not make important decisions for 24 hours.      If you had general anesthesia, you may have a sore throat for a couple of days related to the breathing tube used during surgery.  You may use Cepacol lozenges to help with this discomfort.  If it worsens or if you develop a fever, contact your surgeon.       If you feel your pain is not well managed with the pain medications prescribed by your surgeon, please contact your surgeon's office to let them know so they can address your concerns.       CoVid 19 Information    We want to give you  information regarding Covid. Please consult your primary care provider with any questions you might have.     Patient who have symptoms (cough, fever, or shortness of breath), need to isolate for 7 days from when symptoms started OR 72 hours after fever resolves (without fever reducing medications) AND improvement of respiratory symptoms (whichever is longer).      Isolate yourself at home (in own room/own bathroom if possible)    Do Not allow any visitors    Do Not go to work or school    Do Not go to [employer],  centers, shopping, or other public places.    Do Not shake hands.    Avoid close and intimate contact with others (hugging, kissing).    Follow CDC recommendations for household cleaning of frequently touched services.     After the initial 7 days, continue to isolate yourself from household members as much as possible. To continue decrease the risk of community spread and exposure, you and any members of your household should limit activities in public for 14 days after starting home isolation.     You can reference the following CDC link for helpful home isolation/care tips:  https://www.cdc.gov/coronavirus/2019-ncov/downloads/10Things.pdf    Protect Others:    Cover Your Mouth and Nose with a mask, disposable tissue or wash cloth to avoid spreading germs to others.    Wash your hands and face frequently with soap and water    Call Your Primary Doctor If: Breathing difficulty develops or you become worse.    For more information about COVID19 and options for caring for yourself at home, please visit the CDC website at https://www.cdc.gov/coronavirus/2019-ncov/about/steps-when-sick.html  For more options for care at [facility], please visit our website at [email or web address]          Crutch Instructions      Because of your surgery you will need crutches.  Make sure you tell your healthcare provider if crutches do not seem to work for you.  Using Crutches  "  Crutches are the most commonly used device for injuries to the lower part of the body because they allow the most mobility. All walking assistance devices require strength in your upper body but crutches require more coordination. If you are unable to use crutches then a cane or walker may be better.   Remember these rules when using crutches:   Always look straight ahead when you walk. Do not look down.   Hold the top padded part of the crutches into your chest near your armpits. Grasp the padded hand  and always support your weight with your arms and hands.   Do not lean on the crutches with your armpit as this could cause nerve damage.  Standing Up  Make sure you are in a stable chair. Move forward to the edge of the chair so that your  good  foot is flat on the floor. Put both crutches together and hold the handgrips in one hand. Stretch the injured leg out straight and then use the crutches with one hand and the chair armrest with the other hand to push yourself into a standing position onto your  good  leg. Once you are standing, wait until you are steady before placing a crutch in each hand. Until you become good at standing, have someone assist you in case you lose your balance. When standing still, lean slightly forward with the crutches ahead of you and about 3 feet apart. Unless you are told otherwise, you should keep weight off your injured leg.  Walking  To begin crutch walking, balance yourself on your  good  leg. Move both crutches forward about the length of one step and place them firmly on the floor in front of you about 3 feet apart. Support your weight on the padded hand rests while leaning forward. Press the top of the crutches against your chest wall and not into your armpits. Be sure to hold the injured leg up off of the floor. When ready, swing the \"good\" leg forward about one step length past the crutches while supporting your weight on the padded hand . Be careful not to go too " "far. Transfer your weight onto the \"good\" leg then bring both crutches forward about the length of one step. Repeating this motion will allow you to move fairly quickly without the use of your injured leg. Keep practicing until you become good at crutch walking.  Sitting Down  Make sure the chair you are about to sit on is stable. Walk in front of the chair such that you are facing away from it. Move back a little at a time until the back of your  good  leg is nearly touching the seat. Keeping your weight on the  good  leg, move both crutches to one hand holding onto the handgrips. Lean forward, bend your  good  knee, and move your injured leg out forward. Sit down slowly while using your free hand to reach for the chair s armrest for support. Place the crutches where you can easily get them. Never pivot, even on your  good  leg. This could cause you to fall or injure your  good  leg.  Navigating Stairs, Curbs & Door Steps  Only attempt this once you are very comfortable with regular crutch walking and only when someone is there to steady you should you begin to lose your balance. Hold on to a  railing with one hand and both crutches in the other hand or have someone carry the loose crutch for you. It does not matter which side the handrail is on. If there is no handrail, you can use both crutches. Using only crutches is the same as the railing method but maintaining your balance can be difficult. The crutch-only method is not recommended until you have become very good at crutch walking. Be sure to only take one step at a time. A simple rule to remember for crutches when navigating stairs or curbs: up with the  good  leg and down with the  bad .  Going Up Stairs or Curbs  Walk close to the first step with the crutches slightly behind you. Push down on the handrail and the crutch and step up with the \"good\" leg. You may have to make a short hop to do this if you are not allowed to place any weight on the " "injured leg. Lean forward and bring the injured leg and the crutch up beside the \"good\" leg. Repeat this until you have climbed up all of the steps. Remember, the \"good\" leg goes up first and the crutches move with the injured leg. The person helping you should stand behind and to your side that is away from the railing.  Going Down Stairs or Curbs  Walk to the edge of the first step. While standing and balancing on the  good  leg, place both crutches in one hand and then down on the step below. Be sure to support your weight by leaning on the crutches and handrail. Bring the injured leg forward, then the \"good\" leg down to the same step as the crutches. Remember crutches go down first with the injured leg. The person helping you should  front and to your side that is away from the railing.  Sitting Method for Navigating Stairs  A safer way to get up and down stairs is to sit down. To go up steps, sit down on the second or third step. Push with your  good  leg below while pushing up with both arms on the step above to move your bottom to the next step. When you get to the top of the stairs you may need to use the  scooting  method described later to get back up. To go down steps you first need to lower yourself to the floor near the top of the steps. Once seated, support yourself with your  good  leg on the second to next step below, and push with both arms on the step where you are sitting to move your bottom down to the next step. When you reach the bottom, pull yourself up to standing position using your crutches. It is helpful if someone can move your crutches and assist you in getting up and down. With practice it may be possible to manage on your own.  If You Start To Fall  If you start to fall and cannot get your balance, throw the crutches away from you. Try to fall onto your  good  side away from your injury and then break your fall with your arms. If uninjured, you can usually get back up by moving " "into a sitting position and scooting to a chair. Push with your arms and hands on the chair seat while pushing with the \"good\" leg to get up into the chair. You should not attempt to get back up if you are having significant pain. Call for assistance or dial 911 for an ambulance if necessary.  Safety Tips for Crutch Walking   At first you may want to wear a training belt (or a strong regular belt) so others can assist you.   Do not use crutches if you feel dizzy or drowsy.   Be careful on slick or wet surfaces like a kitchen or bathroom floor, snow, ice, or rainy conditions.   Temporarily remove pets, throw rugs or other items from your home that might trip you.   Do not hop around while holding onto furniture.   Wear sneakers or rubber soled shoes that will not easily slip or come off.   Be careful on ramps or slopes as they can be harder to walk up or down   Do not remove any parts from your crutches especially the padding or rubber traction footings. Replace padding or footings that become damaged immediately.   It is important to use your crutches correctly. If you feel any numbness or tingling in your arms, you are probably using the crutches incorrectly.  Helpful Hints   A bedside toilet or toilet riser may be helpful.   Always allow for extra time to get around. Children in school should be given permission to leave class early to avoid crowds when changing classes.   Elevate the injured leg when sitting.   Use a backpack to carry books or waist pouch to carry other items so that both hands are free.   Call your healthcare provider if you have any questions or concerns.        Reasons to contact your surgeon:    1. Signs of possible infection: Check your incision daily for redness, swelling, warmth, red streaks or foul drainage.   2. Elevated temperature.  3. Pain not controlled with pain medication and/or rest.   4. Uncontrolled nausea or vomiting.  5. Any questions or concerns.      **If you have questions " or concerns about your procedure,   call Dr. Bhagat at [phone]**

## 2020-04-18 NOTE — ANESTHESIA POSTPROCEDURE EVALUATION
Patient: Jayro W Boldenow    Procedure(s):  LEFT SECOND TOE AMPUTATION    Diagnosis:Gas gangrene (H) [A48.0]  Diagnosis Additional Information: No value filed.    Anesthesia Type:  MAC    Note:  Anesthesia Post Evaluation    Patient location during evaluation: PACU  Patient participation: Able to fully participate in evaluation  Level of consciousness: awake and alert  Pain management: adequate  Airway patency: patent  Cardiovascular status: acceptable and stable  Respiratory status: acceptable, spontaneous ventilation, unassisted and nonlabored ventilation  Hydration status: acceptable  PONV: none             Last vitals:  Vitals:    04/18/20 1110 04/18/20 1120 04/18/20 1130   BP: (!) 149/88 (!) 153/86    Pulse: 69 57    Resp: 10 11 10   Temp:      SpO2: 98% 100% 100%         Electronically Signed By: Anselmo Ragsdale MD  April 18, 2020  11:34 AM

## 2020-04-20 LAB
COPATH REPORT: NORMAL
INTERPRETATION ECG - MUSE: NORMAL

## 2020-04-21 ENCOUNTER — CARE COORDINATION (OUTPATIENT)
Dept: CARDIOLOGY | Facility: CLINIC | Age: 70
End: 2020-04-21

## 2020-04-21 LAB
BACTERIA SPEC CULT: ABNORMAL
Lab: ABNORMAL
SPECIMEN SOURCE: ABNORMAL

## 2020-04-21 NOTE — PROGRESS NOTES
Gill Doty PA Gerdowsky, Christina RN    Caller: Unspecified (Today, 10:53 AM)               Ok that's interesting, I had no idea he was having another toe amputated. When I asked him about his feet he said everything was healing well??     I guess it is up to him about whether he would like to proceed with the stress test tomorrow or cancel it. I was doing it because I couldn't figure out why he was still dizzy and having chest pressure despite getting his BP under better control.   It might be good to still go ahead, but if he is feeling better and thinks it was all related to the toe, then it would be ok to cancel.     Thanks   Gill

## 2020-04-21 NOTE — PROGRESS NOTES
Gill Doty PA Gerdowsky, Christina, RN    Caller: Unspecified (Today, 10:53 AM)               Sounds reasonable. Thanks for the update.   Gill

## 2020-04-21 NOTE — PROGRESS NOTES
"Spoke to pt. He feels better after having his L second toe amputated on 4/18. Pt did have gangrene starting. Wound cultures are growing out staph aureus & strep group B. Pt states the swelling in his foot and leg are almost gone. Pain is much better. He is also not having such intense dizzy spells, \"it is getting better. I wonder if it was the toe causing all these issues.\"  No more CP episodes.   Wt is stable at 251 lbs, was 250 lbs yesterday.   SBPs 120s/80s, HR 80s    Pt did trial stopping gabapentin per Dr Cordova to see if that helped with dizziness; pt states it made no difference so he started it last evening and did not notice a difference.     Next appt 4/27 zahra/Gill     Will send Gill an update.   Princess Chapa RN 11:01 AM 04/21/20    "

## 2020-04-21 NOTE — PROGRESS NOTES
He would still like to pursue stress test to be sure everything is ok cardiac wise.     Will update Gill Chapa RN 12:37 PM 04/21/20

## 2020-04-22 ENCOUNTER — HOSPITAL ENCOUNTER (OUTPATIENT)
Dept: CARDIOLOGY | Facility: CLINIC | Age: 70
Discharge: HOME | End: 2020-04-22
Attending: PHYSICIAN ASSISTANT | Admitting: PHYSICIAN ASSISTANT
Payer: COMMERCIAL

## 2020-04-22 ENCOUNTER — HOSPITAL ENCOUNTER (OUTPATIENT)
Dept: NUCLEAR MEDICINE | Facility: CLINIC | Age: 70
Setting detail: NUCLEAR MEDICINE
End: 2020-04-22
Attending: PHYSICIAN ASSISTANT
Payer: COMMERCIAL

## 2020-04-22 ENCOUNTER — HOSPITAL ENCOUNTER (OUTPATIENT)
Dept: LAB | Facility: CLINIC | Age: 70
Setting detail: NUCLEAR MEDICINE
End: 2020-04-22
Attending: PHYSICIAN ASSISTANT
Payer: COMMERCIAL

## 2020-04-22 DIAGNOSIS — I50.22 CHRONIC SYSTOLIC CONGESTIVE HEART FAILURE (H): ICD-10-CM

## 2020-04-22 DIAGNOSIS — I50.23 ACUTE ON CHRONIC SYSTOLIC CONGESTIVE HEART FAILURE (H): ICD-10-CM

## 2020-04-22 DIAGNOSIS — I10 ESSENTIAL HYPERTENSION: ICD-10-CM

## 2020-04-22 DIAGNOSIS — I25.119 CORONARY ARTERY DISEASE INVOLVING NATIVE CORONARY ARTERY OF NATIVE HEART WITH ANGINA PECTORIS (H): ICD-10-CM

## 2020-04-22 LAB
ANION GAP SERPL CALCULATED.3IONS-SCNC: 2 MMOL/L (ref 3–14)
BUN SERPL-MCNC: 21 MG/DL (ref 7–30)
CALCIUM SERPL-MCNC: 8.7 MG/DL (ref 8.5–10.1)
CHLORIDE SERPL-SCNC: 103 MMOL/L (ref 94–109)
CO2 SERPL-SCNC: 29 MMOL/L (ref 20–32)
CREAT SERPL-MCNC: 1.08 MG/DL (ref 0.66–1.25)
CV STRESS MAX HR HE: 89
ERYTHROCYTE [DISTWIDTH] IN BLOOD BY AUTOMATED COUNT: 12.7 % (ref 10–15)
GFR SERPL CREATININE-BSD FRML MDRD: 69 ML/MIN/{1.73_M2}
GLUCOSE SERPL-MCNC: 254 MG/DL (ref 70–99)
HCT VFR BLD AUTO: 40.4 % (ref 40–53)
HGB BLD-MCNC: 13 G/DL (ref 13.3–17.7)
MCH RBC QN AUTO: 29.3 PG (ref 26.5–33)
MCHC RBC AUTO-ENTMCNC: 32.2 G/DL (ref 31.5–36.5)
MCV RBC AUTO: 91 FL (ref 78–100)
NT-PROBNP SERPL-MCNC: 512 PG/ML (ref 0–125)
PLATELET # BLD AUTO: 226 10E9/L (ref 150–450)
POTASSIUM SERPL-SCNC: 4.6 MMOL/L (ref 3.4–5.3)
RATE PRESSURE PRODUCT: NORMAL
RBC # BLD AUTO: 4.43 10E12/L (ref 4.4–5.9)
SODIUM SERPL-SCNC: 134 MMOL/L (ref 133–144)
STRESS ECHO BASELINE DIASTOLIC HE: 82
STRESS ECHO BASELINE HR: 78
STRESS ECHO BASELINE SYSTOLIC BP: 143
STRESS ECHO CALCULATED PERCENT HR: 59 %
STRESS ECHO LAST STRESS DIASTOLIC BP: 72
STRESS ECHO LAST STRESS SYSTOLIC BP: 124
STRESS ECHO TARGET HR: 151
WBC # BLD AUTO: 6.2 10E9/L (ref 4–11)

## 2020-04-22 PROCEDURE — A9502 TC99M TETROFOSMIN: HCPCS | Performed by: PHYSICIAN ASSISTANT

## 2020-04-22 PROCEDURE — 93016 CV STRESS TEST SUPVJ ONLY: CPT | Performed by: INTERNAL MEDICINE

## 2020-04-22 PROCEDURE — 85027 COMPLETE CBC AUTOMATED: CPT | Performed by: PHYSICIAN ASSISTANT

## 2020-04-22 PROCEDURE — 83880 ASSAY OF NATRIURETIC PEPTIDE: CPT | Performed by: PHYSICIAN ASSISTANT

## 2020-04-22 PROCEDURE — 25000128 H RX IP 250 OP 636

## 2020-04-22 PROCEDURE — 80048 BASIC METABOLIC PNL TOTAL CA: CPT | Performed by: PHYSICIAN ASSISTANT

## 2020-04-22 PROCEDURE — 34300033 ZZH RX 343: Performed by: PHYSICIAN ASSISTANT

## 2020-04-22 PROCEDURE — 93018 CV STRESS TEST I&R ONLY: CPT | Performed by: INTERNAL MEDICINE

## 2020-04-22 PROCEDURE — 78452 HT MUSCLE IMAGE SPECT MULT: CPT

## 2020-04-22 PROCEDURE — 78452 HT MUSCLE IMAGE SPECT MULT: CPT | Mod: 26 | Performed by: INTERNAL MEDICINE

## 2020-04-22 PROCEDURE — 36415 COLL VENOUS BLD VENIPUNCTURE: CPT | Performed by: PHYSICIAN ASSISTANT

## 2020-04-22 PROCEDURE — 93017 CV STRESS TEST TRACING ONLY: CPT

## 2020-04-22 RX ORDER — AMINOPHYLLINE 25 MG/ML
INJECTION, SOLUTION INTRAVENOUS
Status: DISCONTINUED
Start: 2020-04-22 | End: 2020-04-22 | Stop reason: WASHOUT

## 2020-04-22 RX ORDER — REGADENOSON 0.08 MG/ML
INJECTION, SOLUTION INTRAVENOUS
Status: COMPLETED
Start: 2020-04-22 | End: 2020-04-22

## 2020-04-22 RX ORDER — REGADENOSON 0.08 MG/ML
INJECTION, SOLUTION INTRAVENOUS
Status: DISCONTINUED
Start: 2020-04-22 | End: 2020-04-22 | Stop reason: WASHOUT

## 2020-04-22 RX ADMIN — TETROFOSMIN 32 MCI.: 1.38 INJECTION, POWDER, LYOPHILIZED, FOR SOLUTION INTRAVENOUS at 09:15

## 2020-04-22 RX ADMIN — TETROFOSMIN 11 MCI.: 1.38 INJECTION, POWDER, LYOPHILIZED, FOR SOLUTION INTRAVENOUS at 07:40

## 2020-04-22 RX ADMIN — REGADENOSON 0.4 MG: 0.08 INJECTION, SOLUTION INTRAVENOUS at 09:14

## 2020-04-23 ENCOUNTER — OFFICE VISIT (OUTPATIENT)
Dept: PODIATRY | Facility: CLINIC | Age: 70
End: 2020-04-23
Payer: COMMERCIAL

## 2020-04-23 VITALS
WEIGHT: 256.5 LBS | BODY MASS INDEX: 31.24 KG/M2 | DIASTOLIC BLOOD PRESSURE: 80 MMHG | SYSTOLIC BLOOD PRESSURE: 128 MMHG | HEIGHT: 76 IN

## 2020-04-23 DIAGNOSIS — Z89.422 STATUS POST AMPUTATION OF LESSER TOE OF LEFT FOOT (H): ICD-10-CM

## 2020-04-23 DIAGNOSIS — Z79.4 TYPE 2 DIABETES MELLITUS WITH HYPEROSMOLARITY WITHOUT COMA, WITH LONG-TERM CURRENT USE OF INSULIN (H): ICD-10-CM

## 2020-04-23 DIAGNOSIS — E11.00 TYPE 2 DIABETES MELLITUS WITH HYPEROSMOLARITY WITHOUT COMA, WITH LONG-TERM CURRENT USE OF INSULIN (H): ICD-10-CM

## 2020-04-23 DIAGNOSIS — G63 POLYNEUROPATHY ASSOCIATED WITH UNDERLYING DISEASE (H): ICD-10-CM

## 2020-04-23 DIAGNOSIS — Z09 SURGERY FOLLOW-UP EXAMINATION: Primary | ICD-10-CM

## 2020-04-23 DIAGNOSIS — L84 PRE-ULCERATIVE CORN OR CALLOUS: ICD-10-CM

## 2020-04-23 PROCEDURE — 99024 POSTOP FOLLOW-UP VISIT: CPT | Performed by: PODIATRIST

## 2020-04-23 ASSESSMENT — MIFFLIN-ST. JEOR: SCORE: 2029.98

## 2020-04-23 NOTE — PROGRESS NOTES
"S: Jayro Elder  presents 1 week post op.      POSTOPERATIVE DIAGNOSES:   1.  Diabetes mellitus with peripheral neuropathy.   2.  Status post previous toe amputations for infection.   3.  Necrotic wound, distal left second toe with x-ray concerning for gas gangrene.      PROCEDURE:  Partial left second toe amputation.     He reports 5/10 pain that is \"random.\"    History from the operative report:  The patient is a pleasant 69-year-old neuropathic diabetic male well known to our department who has undergone multiple previous toe amputations.  He came into my clinic earlier this week with worsening wounds of first MP joint, left foot and at the distal aspect of the left second toe.  While the area certainly was erythematous and edematous, there was no exposed bone; however, x-rays were read as showing gas gangrene.  Based on the patient's pain levels and the clinical exam, I advised proceeding with amputation of the distal aspect of the toe.  As the patient is a moderate risk for coronavirus, we planned to do this is a same day procedure rather than admission, especially as the patient was showing no ascension of the infection into the foot nor was he showing any constitutional signs of infection.       O:   Vascular:  Pedal pulses are palpable.  Moderate stump  edema.    Derm: The incision is well coapted.  Sutures are intact.  Localized carrie-incisional erythema. No ascension.     Thick hyperkeratotic lesion sub left plantar medial forefoot.     ASSESSMENT:  1) s/p above noted surgery.  No clinical signs of infection.  Pain is controlled.  Encounter Diagnoses   Name Primary?     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     PLAN:  1) " Using a #15 bladed I paired down the plantar hyperkeratotic tissue. There was mild bleeding.  The area was cleansed with an alcohol wipe.  Bacitracin and bandaid applied.      2) sterile re-dress left foot    3) he is to complete the Augmentin    4) CAM walker substituted for the surgical shoe, as he said his foot was slipping out of the shoe.    Pt advised to remove CAM walker several times a day and do ankle ROM exercises/wiggle toes.   It is also recommended that a thick-soled shoe be worn on the contralateral foot to offset any created leg length issue.  CAM does not have to be worn at night.    Pt is warned to not drive with CAM walker on.  This is due to safety and legal issues.    We discussed immoblization and the risk of blood clot.  ROM exercises and knee-high compression recommended.  Patient to seek medical attention if calf swelling and/or pain, chest pain, shortness of breath.    5) follow up the week of 5/4/20, sooner if concerns.     Kwasi Root DPM, FACFAS, [facility] of [facility]/Foot & Ankle Surgery

## 2020-04-23 NOTE — LETTER
"    4/23/2020         RE: Jayro Elder  [address] [place] [address]        Dear Colleague,    Thank you for referring your patient, Jayro Elder, to the [facility]. Please see a copy of my visit note below.    S: Jayro Elder  presents 1 week post op.      POSTOPERATIVE DIAGNOSES:   1.  Diabetes mellitus with peripheral neuropathy.   2.  Status post previous toe amputations for infection.   3.  Necrotic wound, distal left second toe with x-ray concerning for gas gangrene.      PROCEDURE:  Partial left second toe amputation.     He reports 5/10 pain that is \"random.\"    History from the operative report:  The patient is a pleasant 69-year-old neuropathic diabetic male well known to our department who has undergone multiple previous toe amputations.  He came into my clinic earlier this week with worsening wounds of first MP joint, left foot and at the distal aspect of the left second toe.  While the area certainly was erythematous and edematous, there was no exposed bone; however, x-rays were read as showing gas gangrene.  Based on the patient's pain levels and the clinical exam, I advised proceeding with amputation of the distal aspect of the toe.  As the patient is a moderate risk for coronavirus, we planned to do this is a same day procedure rather than admission, especially as the patient was showing no ascension of the infection into the foot nor was he showing any constitutional signs of infection.       O:   Vascular:  Pedal pulses are palpable.  Moderate stump  edema.    Derm: The incision is well coapted.  Sutures are intact.  Localized carrie-incisional erythema. No ascension.     Thick hyperkeratotic lesion sub left plantar medial forefoot.     ASSESSMENT:  1) s/p above noted surgery.  No clinical signs of infection.  Pain is controlled.  Encounter Diagnoses   Name Primary?     Surgery follow-up examination Yes     Status post amputation of lesser toe of left foot " (H)      Pre-ulcerative corn or callous      Type 2 diabetes mellitus with hyperosmolarity without coma, with long-term current use of insulin (H)      Polyneuropathy associated with underlying disease (H)      Surgical site stable.     PLAN:  1) Using a #15 bladed I paired down the plantar hyperkeratotic tissue. There was mild bleeding.  The area was cleansed with an alcohol wipe.  Bacitracin and bandaid applied.      2) sterile re-dress left foot    3) he is to complete the Augmentin    4) CAM walker substituted for the surgical shoe, as he said his foot was slipping out of the shoe.    Pt advised to remove CAM walker several times a day and do ankle ROM exercises/wiggle toes.   It is also recommended that a thick-soled shoe be worn on the contralateral foot to offset any created leg length issue.  CAM does not have to be worn at night.    Pt is warned to not drive with CAM walker on.  This is due to safety and legal issues.    We discussed immoblization and the risk of blood clot.  ROM exercises and knee-high compression recommended.  Patient to seek medical attention if calf swelling and/or pain, chest pain, shortness of breath.    5) follow up the week of 5/4/20, sooner if concerns.     Kwasi Root DPM, FACFAS, [facility] of [facility]iatry/Foot & Ankle Surgery        Again, thank you for allowing me to participate in the care of your patient.        Sincerely,        Kwasi Root DPM

## 2020-04-24 ENCOUNTER — TELEPHONE (OUTPATIENT)
Dept: CARDIOLOGY | Facility: CLINIC | Age: 70
End: 2020-04-24

## 2020-04-24 NOTE — TELEPHONE ENCOUNTER
Left VM for pt so I could review his stress test results with him and see if he is continuing with dizziness and not feeling well in the evening.   Princess Chapa RN 9:44 AM 04/24/20

## 2020-04-24 NOTE — TELEPHONE ENCOUNTER
"----- Message from CONCEPCION Dasilva sent at 4/23/2020 12:47 PM CDT -----  Regarding: stress test/labs  Let him know that his labs all looked very good.  His stress test was \"abnormal\" but it was the same abnormal as last time. Meaning, we know he has has heart damage from prior events, but there does not look like any NEW areas that aren't getting good blood flow.    I can talk to him about it more in detail at our next phone visit.    Thanks  Gill"

## 2020-04-25 LAB
BACTERIA SPEC CULT: ABNORMAL
Lab: ABNORMAL
SPECIMEN SOURCE: ABNORMAL

## 2020-04-26 NOTE — PROGRESS NOTES
CARDIOLOGY TELEPHONE VISIT    Jayro Elder is a 69 year old male who is being evaluated via a billable telephone visit.      The patient has been notified of following:     This telephone visit will be conducted via a call between you and your physician/provider. Given concern for spread of COVID 19 we are minimizing in person clinic visits when possible. We have found that certain health care needs can be provided without the need for a physical exam.  This service lets us provide the care you need with a short phone conversation.  If a prescription is necessary we can send it directly to your pharmacy.  If lab work is needed we can place an order for that and you can then stop by our lab to have the test done at a later time. If during the course of the call the physician/provider feels a telephone visit is not appropriate, you will not be charged for this service.    Telephone visits are billed at different rates depending on your insurance coverage. During this emergency period, for some insurers they may be billed the same as an in-person visit.  Please reach out to your insurance provider with any questions.    Patient has given verbal consent for Telephone visit?  Yes    AVS to me sent via mail.     Review Of Systems  Skin: NEGATIVE  Eyes:Ears/Nose/Throat:wears  glasses, occ. Ringing in ears  Respiratory: NEGATIVE  Cardiovascular:occ. Palpitations, dizziness  Gastrointestinal: NEGATIVE  Genitourinary:NEGATIVE   Musculoskeletal: arthritis  Neurologic: neuropathy in hands and feet  Psychiatric: NEGATIVE  Hematologic/Lymphatic/Immunologic: thyroid disorder, diabetes  Endocrine:  NEGATIVE  Vitals today are:  /83   Pulse 83  Weight 251.0 lb  STEPHANIE Nelson    I have reviewed and updated the patient's Past Medical History, Social History, Family History and Medication List.      MEDICATIONS:  Current Outpatient Medications   Medication Sig Dispense Refill     apixaban ANTICOAGULANT (ELIQUIS) 5 MG  tablet Take 1 tablet (5 mg) by mouth 2 times daily       atorvastatin (LIPITOR) 40 MG tablet Take 1 tablet (40 mg) by mouth every evening 90 tablet 0     carvedilol (COREG) 25 MG tablet Take 1 tablet (25 mg) by mouth 2 times daily (with meals)       clopidogrel (PLAVIX) 75 MG tablet Take 1 tablet (75 mg) by mouth daily 90 tablet 3     fluticasone (FLONASE) 50 MCG/ACT nasal spray Spray 2 sprays into both nostrils daily 18.2 mL 1     furosemide (LASIX) 40 MG tablet Take 1 tablet (40 mg) by mouth daily 90 tablet 3     gabapentin (NEURONTIN) 100 MG capsule Take 2 capsules (200 mg) by mouth At Bedtime 180 capsule 0     HYDROcodone-acetaminophen (NORCO) 5-325 MG tablet Take 1-2 tablets every 4-6 hours as needed for pain.  Do not take other tylenol products with this medication, as too much tylenol can be damaging to the liver. 15 tablet 0     hydrOXYzine (ATARAX) 10 MG tablet Take 1-2 tablets every 4-6 hours as needed for pain control. 15 tablet 0     insulin aspart (NOVOLOG FLEXPEN) 100 UNIT/ML pen Inject 8 Units Subcutaneous daily (with lunch)       insulin aspart (NOVOLOG FLEXPEN) 100 UNIT/ML pen Inject 10 Units Subcutaneous daily (with dinner)       insulin aspart (NOVOLOG FLEXPEN) 100 UNIT/ML pen Inject Subcutaneous 3 times daily (with meals) Sliding Scale  Do not give sliding scale insulin if Pre-Meal BG less than 140.   For Pre-Meal:   - 200 give 2 units.    - 250 give 4 units.    - 300 give 6 units.    - 350 give 8 units.    - 400 give 10 units.       insulin aspart (NOVOLOG PEN) 100 UNIT/ML pen Inject 8 Units Subcutaneous daily (with breakfast)        insulin glargine (LANTUS SOLOSTAR PEN) 100 UNIT/ML pen Inject 36 Units Subcutaneous every morning       insulin pen needle 31G X 6 MM Use  as directed. 100 each prn     isosorbide mononitrate (IMDUR) 60 MG 24 hr tablet Take 1 tablet (60 mg) by mouth daily       levothyroxine (SYNTHROID, LEVOTHROID) 137 MCG tablet Take 137 mcg by mouth  daily  90 tablet 3     lisinopril (ZESTRIL) 40 MG tablet Take 0.5 tablets (20 mg) by mouth daily       nitroglycerin (NITROSTAT) 0.4 MG sublingual tablet Place 1 tablet (0.4 mg) under the tongue every 5 minutes as needed for chest pain 50 tablet 0     order for DME Equipment being ordered: size 12 surgical shoe 1 Device 0     order for DME Equipment being ordered: Walker Wheels () and Walker ()  Treatment Diagnosis: Impaired gait, heel WB bilaterally. 1 each 0     pantoprazole (PROTONIX) 40 MG enteric coated tablet Take 1 tablet (40 mg) by mouth every morning (before breakfast) 30 tablet 0     spironolactone (ALDACTONE) 25 MG tablet Take 1 tablet (25 mg) by mouth daily       ASPIRIN NOT PRESCRIBED (INTENTIONAL) Antiplatelet medication not prescribed intentionally due to Current anticoagulant therapy (warfarin/enoxaparin) (Patient not taking: Reported on 4/17/2020)         ALLERGIES  No known allergies      Primary cardiologist: Dr. Justin Tomlin      HPI:  Mr. Elder is a 68 year old male with a PMHx including DM2, hypothyroidism, hypertension, CVA, untreated MARISOL, and renal dysfunction. He also has chronic afib (on AC), and known coronary disease with prior MI/stenting and resultant ischemic CMO with chronic EF of 35-40%. He also struggles with peripheral wounds from his diabetes. In Jan 2019 he was admitted for osteomyelitis and his stay was complicated by ABNER with IV antibiotics and his long term clopidogrel was stopped at that time. He presented back shortly after, with weight gain and ANDREWS. He was again hospitalized and treated for a CHF exacerbation, and was diuresed ~30 pounds. Echo showed no new WMAs and his EF remained in the 35-40% range. He was again hospitalized in March 2019 for right foot cellulitis and right toe osteomyelitis. He got IV abx and underwent partial toe amputation as well as I+D of his left foot ulcer. During that stay, he did not have an exacerbation of his heart failure, and  "his atrial fibrillation remained under good control.      In early July 2019 he was admitted again for evaluation of chest pain. He underwent SUSAN x 3 to the RCA. Echo showed EF remained 35-40% without significant changes. He returned a few weeks later with atypical chest pain, and medical management was recommended as repeat stress test did not show any new ischemia. I have been following him periodically in [facility], but we had been spacing out visits at his request due to to finances/transportation. At our last formal visit in Nov 2019 he was at his perceived baseline and I made no changes at that time.    A few weeks ago he called our office to report low BPs at home with orthostasis. BP was as low as 80/60 while standing, though it sounds like home heart rates remained mostly in the 70s.We temporarily held his lisinopril and spironolactone and I decreased his dose of carvedilol. BPs improved, but dizziness continued. I checked labs including a hemoglobin and BMP, both of which were unremarkable. NT-proBNP was elevated at 796, but notably, he has been as high as 8k when volume overloaded. He told me that his foot wound was healing well with no concerns.    We had a virtual visit about 2 weeks ago when he reported that he self increased his lasix to 80mg x 2 days due to some swelling, and this improved. His home BPs then normalized and started to climb slowly. He continued to report times of \"just not feeling good\" with an occasional \"band of tightness\" around his chest and between his shoulders. These episodes were not clearly exertional. He felt like his legs were \"like rubber\" at times with a bit more ANDREWS than his usual. I discussed with Dr. Tomlin who recommended a Lexiscan to see if another invasive ischemic workup was warranted. Notably, the following week, he called to report that overall he was feeling better after having a toe amputation on 4/18 as he was told he had gangrene. He told me this \"snuck up " "on him\" and he did not have any issues only a few days prior to noticing the redness. As he was feeling better, I gave him the option to cancel his stress test, but he elected to proceed to ensure nothing new going on from a cardiac standpoint.    Today, we are having another virtual telephone visit. Since we spoke last, he tells me he's been feeling better, but not \"100%.\" His home BPs were running higher in the 150s so he self increased his carvedilol back to 25mg BID. In addition, he didn't like how the higher dose of Imdur made him feel so he went back to 60mg daily. Home BP is now running mostly 120s/80s, but he tells me can go higher as the day goes on. He still has occasional dizziness and palpitations but says it's improved. He no longer has any lower extremity swelling. He had his stress test performed 4/22 which was abnormal, showing EF 37% with akinesis of mid to distal anterior, anteroseptal and apical walls. There was also a medium medium sized area of a severe degree of infarction in the mid to distal anterior, apical and anteroseptal segment(s) of the left ventricle.  No ischemia is identified, and notably, this was not significant changed when compared to July of 2019.        Assessment/Plan:        1. Coronary artery disease.              --Long cardiac hx including MI in 2005 with proximal LAD stenting, repeat LAD stenting in 2009 and distal LAD stenting in 2013. In June 2017 he had stent placement to an obtuse marginal. In July 2019, he was found to have progression of his disease and underwent SUSAN x 3 to the RCA. He returned a few weeks later with atypical CP in later July 2019 and Lexiscan did not show new ischemia. Thus, we have been medically managing him.   --He has had some recent issues including transient hypotension and brief volume overload at home along with some tightness in his chest. Repeat lexiscan as above, is abnormal, but unchanged from July 2019. Overall, he is starting to feel " better after amputation of a gangrenous toe.    --Will continue Imdur, back to his initial dose of 60mg daily as he feels better on this.                --Continue Plavix with his Eliquis as below,              --Continue atorvastatin 40mg at bedtime. Last LDL under excellent control at 43, July 2019. Plan routine repeat fasting labs this summer.      2.  Chronic systolic heart failure, known ischemic cardiomyopathy.              --Known chronic EF 35-40%, which remained unchanged per last echo July 2019 when he returned with angina, requiring repeat intervention.               --He had transient hypotension with orthostatic symptoms which was likely likely secondary to his gangrenous toe. He is now back on full dose carvedilol 25mg BID along with his spironolactone 25mg daily and tolerating well.  Now that his BP has rebounded, will resume lisinopril at 20mg daily. If he continues with BP room can consider increase back to his 40mg dose which he was on previously. Historically his renal function has remained preserved.               --Will also continue the Lasix at 40mg daily. Per his report, his swelling is now gone and weight is back to baseline.      3. Chronic atrial fibrillation.              --Appears rate controlled, self reports heart rates are in the 70s. Holter performed a few weeks ago showed average HR 88 with chronic afib.               --Continue Eliquis 5mg BID.       Follow up plan: Will check with him via phone in ~2 weeks for a BP and symptom update. Then plan a formal [facility] telephone visit in 4-6 weeks with repeat BMP.     I have reviewed the note as documented above.  This accurately captures the substance of my conversation with the patient.      Phone call contact time  Call Started at [phone]  Call Ended at [phone]    Gill Doty PA-C  [facility]  Pager [phone]

## 2020-04-27 ENCOUNTER — TELEPHONE (OUTPATIENT)
Dept: FAMILY MEDICINE | Facility: CLINIC | Age: 70
End: 2020-04-27

## 2020-04-27 ENCOUNTER — TRANSFERRED RECORDS (OUTPATIENT)
Dept: HEALTH INFORMATION MANAGEMENT | Facility: CLINIC | Age: 70
End: 2020-04-27

## 2020-04-27 ENCOUNTER — VIRTUAL VISIT (OUTPATIENT)
Dept: CARDIOLOGY | Facility: CLINIC | Age: 70
End: 2020-04-27
Attending: PHYSICIAN ASSISTANT
Payer: COMMERCIAL

## 2020-04-27 VITALS
WEIGHT: 251 LBS | DIASTOLIC BLOOD PRESSURE: 83 MMHG | HEART RATE: 83 BPM | HEIGHT: 76 IN | OXYGEN SATURATION: 98 % | SYSTOLIC BLOOD PRESSURE: 122 MMHG | BODY MASS INDEX: 30.56 KG/M2

## 2020-04-27 DIAGNOSIS — I10 HYPERTENSION GOAL BP (BLOOD PRESSURE) < 140/90: ICD-10-CM

## 2020-04-27 DIAGNOSIS — I25.119 CORONARY ARTERY DISEASE INVOLVING NATIVE CORONARY ARTERY OF NATIVE HEART WITH ANGINA PECTORIS (H): Primary | ICD-10-CM

## 2020-04-27 DIAGNOSIS — I50.22 CHRONIC SYSTOLIC CONGESTIVE HEART FAILURE (H): ICD-10-CM

## 2020-04-27 DIAGNOSIS — I48.91 ATRIAL FIBRILLATION, UNSPECIFIED TYPE (H): ICD-10-CM

## 2020-04-27 PROCEDURE — 99212 OFFICE O/P EST SF 10 MIN: CPT | Mod: 95 | Performed by: PHYSICIAN ASSISTANT

## 2020-04-27 RX ORDER — CARVEDILOL 25 MG/1
25 TABLET ORAL 2 TIMES DAILY WITH MEALS
COMMUNITY
Start: 2020-04-27 | End: 2020-10-15

## 2020-04-27 RX ORDER — LISINOPRIL 40 MG/1
20 TABLET ORAL DAILY
COMMUNITY
Start: 2020-04-27 | End: 2020-05-28

## 2020-04-27 RX ORDER — ISOSORBIDE MONONITRATE 60 MG/1
60 TABLET, EXTENDED RELEASE ORAL DAILY
COMMUNITY
Start: 2020-04-27 | End: 2020-06-09

## 2020-04-27 ASSESSMENT — MIFFLIN-ST. JEOR: SCORE: 2005.03

## 2020-04-27 NOTE — PATIENT INSTRUCTIONS
Visit Summary:    Today we discussed:   1. We will restart the lisinopril at 20mg once daily.  2. Continue the carvedilol at 25mg twice a day and the Imdur at 60mg once a day as we discussed.  3. Continue to weigh at home and call with concerns of increased swelling or weight gain.    Test results:   Your stress test did not suggest any new areas that are limiting blood flow. As we talked about, there are some previous areas of the heart that are not squeezing properly, but this is unchanged from July 2019.     Follow up:   The nurse will call you in ~2 weeks for a blood pressure update.  We will then plan another phone visit with Gill in ~4-6 weeks and try to get some fasting labs before that visit.     Please call my nurse Princess at [phone] with any questions or concerns.

## 2020-04-27 NOTE — LETTER
4/27/2020      RE: Jayro Elder  [address] [place] [address]       Dear Colleague,    Thank you for the opportunity to participate in the care of your patient, Jayro Elder, at the [facility]. Please see a copy of my visit note below.    HPI:  Mr. Elder is a 68 year old male with a PMHx including DM2, hypothyroidism, hypertension, CVA, untreated MARISOL, and renal dysfunction. He also has chronic afib (on AC), and known coronary disease with prior MI/stenting and resultant ischemic CMO with chronic EF of 35-40%. He also struggles with peripheral wounds from his diabetes. In Jan 2019 he was admitted for osteomyelitis and his stay was complicated by ABNER with IV antibiotics and his long term clopidogrel was stopped at that time. He presented back shortly after, with weight gain and ANDREWS. He was again hospitalized and treated for a CHF exacerbation, and was diuresed ~30 pounds. Echo showed no new WMAs and his EF remained in the 35-40% range. He was again hospitalized in March 2019 for right foot cellulitis and right toe osteomyelitis. He got IV abx and underwent partial toe amputation as well as I+D of his left foot ulcer. During that stay, he did not have an exacerbation of his heart failure, and his atrial fibrillation remained under good control.      In early July 2019 he was admitted again for evaluation of chest pain. He underwent SUSAN x 3 to the RCA. Echo showed EF remained 35-40% without significant changes. He returned a few weeks later with atypical chest pain, and medical management was recommended as repeat stress test did not show any new ischemia. I have been following him periodically in [facility], but we had been spacing out visits at his request due to to finances/transportation. At our last formal visit in Nov 2019 he was at his perceived baseline and I made no changes at that time.    A  "few weeks ago he called our office to report low BPs at home with orthostasis. BP was as low as 80/60 while standing, though it sounds like home heart rates remained mostly in the 70s.We temporarily held his lisinopril and spironolactone and I decreased his dose of carvedilol. BPs improved, but dizziness continued. I checked labs including a hemoglobin and BMP, both of which were unremarkable. NT-proBNP was elevated at 796, but notably, he has been as high as 8k when volume overloaded. He told me that his foot wound was healing well with no concerns.    We had a virtual visit about 2 weeks ago when he reported that he self increased his lasix to 80mg x 2 days due to some swelling, and this improved. His home BPs then normalized and started to climb slowly. He continued to report times of \"just not feeling good\" with an occasional \"band of tightness\" around his chest and between his shoulders. These episodes were not clearly exertional. He felt like his legs were \"like rubber\" at times with a bit more ANDREWS than his usual. I discussed with Dr. Tomlin who recommended a Lexiscan to see if another invasive ischemic workup was warranted. Notably, the following week, he called to report that overall he was feeling better after having a toe amputation on 4/18 as he was told he had gangrene. He told me this \"snuck up on him\" and he did not have any issues only a few days prior to noticing the redness. As he was feeling better, I gave him the option to cancel his stress test, but he elected to proceed to ensure nothing new going on from a cardiac standpoint.    Today, we are having another virtual telephone visit. Since we spoke last, he tells me he's been feeling better, but not \"100%.\" His home BPs were running higher in the 150s so he self increased his carvedilol back to 25mg BID. In addition, he didn't like how the higher dose of Imdur made him feel so he went back to 60mg daily. Home BP is now running mostly 120s/80s, " but he tells me can go higher as the day goes on. He still has occasional dizziness and palpitations but says it's improved. He no longer has any lower extremity swelling. He had his stress test performed 4/22 which was abnormal, showing EF 37% with akinesis of mid to distal anterior, anteroseptal and apical walls. There was also a medium medium sized area of a severe degree of infarction in the mid to distal anterior, apical and anteroseptal segment(s) of the left ventricle.  No ischemia is identified, and notably, this was not significant changed when compared to July of 2019.      Assessment/Plan:      1. Coronary artery disease.              --Long cardiac hx including MI in 2005 with proximal LAD stenting, repeat LAD stenting in 2009 and distal LAD stenting in 2013. In June 2017 he had stent placement to an obtuse marginal. In July 2019, he was found to have progression of his disease and underwent SUSAN x 3 to the RCA. He returned a few weeks later with atypical CP in later July 2019 and Lexiscan did not show new ischemia. Thus, we have been medically managing him.   --He has had some recent issues including transient hypotension and brief volume overload at home along with some tightness in his chest. Repeat lexiscan as above, is abnormal, but unchanged from July 2019. Overall, he is starting to feel better after amputation of a gangrenous toe.    --Will continue Imdur, back to his initial dose of 60mg daily as he feels better on this.                --Continue Plavix with his Eliquis as below,              --Continue atorvastatin 40mg at bedtime. Last LDL under excellent control at 43, July 2019. Plan routine repeat fasting labs this summer.      2.  Chronic systolic heart failure, known ischemic cardiomyopathy.              --Known chronic EF 35-40%, which remained unchanged per last echo July 2019 when he returned with angina, requiring repeat intervention.               --He had transient hypotension with  orthostatic symptoms which was likely likely secondary to his gangrenous toe. He is now back on full dose carvedilol 25mg BID along with his spironolactone 25mg daily and tolerating well.  Now that his BP has rebounded, will resume lisinopril at 20mg daily. If he continues with BP room can consider increase back to his 40mg dose which he was on previously. Historically his renal function has remained preserved.               --Will also continue the Lasix at 40mg daily. Per his report, his swelling is now gone and weight is back to baseline.      3. Chronic atrial fibrillation.              --Appears rate controlled, self reports heart rates are in the 70s. Holter performed a few weeks ago showed average HR 88 with chronic afib.               --Continue Eliquis 5mg BID.       Follow up plan: Will check with him via phone in ~2 weeks for a BP and symptom update. Then plan a formal [facility] telephone visit in 4-6 weeks with repeat BMP.     I have reviewed the note as documented above.  This accurately captures the substance of my conversation with the patient.    Phone call contact time  Call Started at [phone]  Call Ended at [phone]    Gill Doty PA-C  [facility]  Pager [phone]    Please do not hesitate to contact me if you have any questions/concerns.     Sincerely,     CONCEPCION Dasilva

## 2020-04-27 NOTE — TELEPHONE ENCOUNTER
..[facility] utilizes an encounter to take the place of a direct phone call to your office. Please take a moment to review the below request. Please reply or route message to author of this encounter.  Message will act as a verbal OK of orders requested below. Thank you.    Requesting ongoing skilled nursing visits 1x/week for 2 and 2 PRN    Thank you,    Elizabeth Burt, RN  [phone]

## 2020-05-06 ENCOUNTER — OFFICE VISIT (OUTPATIENT)
Dept: PODIATRY | Facility: CLINIC | Age: 70
End: 2020-05-06
Payer: COMMERCIAL

## 2020-05-06 VITALS
HEIGHT: 76 IN | BODY MASS INDEX: 30.44 KG/M2 | WEIGHT: 250 LBS | DIASTOLIC BLOOD PRESSURE: 72 MMHG | SYSTOLIC BLOOD PRESSURE: 116 MMHG

## 2020-05-06 DIAGNOSIS — E11.42 DIABETIC POLYNEUROPATHY ASSOCIATED WITH TYPE 2 DIABETES MELLITUS (H): Primary | ICD-10-CM

## 2020-05-06 DIAGNOSIS — Z89.422 HISTORY OF AMPUTATION OF LESSER TOE OF LEFT FOOT (H): ICD-10-CM

## 2020-05-06 DIAGNOSIS — Z98.890 POST-OPERATIVE STATE: ICD-10-CM

## 2020-05-06 PROCEDURE — 99024 POSTOP FOLLOW-UP VISIT: CPT | Performed by: PODIATRIST

## 2020-05-06 ASSESSMENT — MIFFLIN-ST. JEOR: SCORE: 2000.49

## 2020-05-06 NOTE — PATIENT INSTRUCTIONS
Thank you for choosing [facility] Podiatry / Foot & Ankle Surgery!    DR. ASENCIO'S CLINIC SCHEDULE  MONDAY AM - [place] TUESDAY - [place]   [address] [address] [address] [address] [place] [address]   [phone] / [phone] / [phone]       WEDNESDAY - [place] FRIDAY AM - [facility]   [address]   [place] [address] [place] [address]   [phone] / [phone]       FRIDAY PM - [place] SCHEDULE SURGERY: [phone] [address] BILLING QUESTIONS: [phone]   [place] [address] AFTER HOURS: [phone]   [phone] / [phone] CONSUMER PRICE LINE: [phone]     APPOINTMENTS: [phone]     Follow up: 6 weeks  Diagnosis: post op toe amputation.   Next steps: can get foot wet and lotion.     Please read through the following handouts and if you have any questions, please feel free to call us or send a LiveRe message!

## 2020-05-06 NOTE — PROGRESS NOTES
"Podiatry / Foot and Ankle Surgery Progress Note    May 6, 2020    Subject: Patient was seen for follow up on 3 weeks status post left partial second toe amputation.  Denies fever, nausea.  No pain due to neuropathy.    Objective:  Vitals: /72   Ht 1.93 m (6' 4\")   Wt 113.4 kg (250 lb)   BMI 30.43 kg/m    BMI= Body mass index is 30.43 kg/m .    A1C: 10.4 (4/2020)    General:  Patient is alert and orientated.  NAD  Dressing is clean dry and intact.  Sutures are intact.  Skin is well coapted.  No redness, dehiscence or signs of acute infection noted.  Ulceration to the plantar right first metatarsal head is healed.  Some pre-ulcerative hyperkeratotic tissue noted over the area but no open lesions noted.  Patient now has partial right second toe amputation.     Assessment:    Diabetic polyneuropathy associated with type 2 diabetes mellitus (H)  Post-operative state  History of amputation of lesser toe of left foot (H)    Plan: At this time the sutures were removed.  Patient can get foot wet.  He can go back to his diabetic shoes and inserts.  Recommend that he lotion the feet daily and use a pumice stone to the pre-ulcerative hyperkeratotic lesion on the right foot as this is a high risk of re-ulceration.  He does not need a big bulky dressing anymore.  He will follow-up in 6 weeks for reassessment.  Was told to call with further questions or concerns.    Татьяна Mckeon DPM, Podiatry/Foot and Ankle Surgery    Weight management plan: Patient was referred to their PCP to discuss a diet and exercise plan.    "

## 2020-05-06 NOTE — LETTER
"    5/6/2020         RE: Jayro Elder  [address] [place] [place] [address]        Dear Colleague,    Thank you for referring your patient, Jayro Elder, to the [facility] PODI[facility]RY. Please see a copy of my visit note below.    Podiatry / Foot and Ankle Surgery Progress Note    May 6, 2020    Subject: Patient was seen for follow up on 3 weeks status post left partial second toe amputation.  Denies fever, nausea.  No pain due to neuropathy.    Objective:  Vitals: /72   Ht 1.93 m (6' 4\")   Wt 113.4 kg (250 lb)   BMI 30.43 kg/m    BMI= Body mass index is 30.43 kg/m .    A1C: 10.4 (4/2020)    General:  Patient is alert and orientated.  NAD  Dressing is clean dry and intact.  Sutures are intact.  Skin is well coapted.  No redness, dehiscence or signs of acute infection noted.  Ulceration to the plantar right first metatarsal head is healed.  Some pre-ulcerative hyperkeratotic tissue noted over the area but no open lesions noted.  Patient now has partial right second toe amputation.     Assessment:    Diabetic polyneuropathy associated with type 2 diabetes mellitus (H)  Post-operative state  History of amputation of lesser toe of left foot (H)    Plan: At this time the sutures were removed.  Patient can get foot wet.  He can go back to his diabetic shoes and inserts.  Recommend that he lotion the feet daily and use a pumice stone to the pre-ulcerative hyperkeratotic lesion on the right foot as this is a high risk of re-ulceration.  He does not need a big bulky dressing anymore.  He will follow-up in 6 weeks for reassessment.  Was told to call with further questions or concerns.    Татьяна Mckeon DPM, Podiatry/Foot and Ankle Surgery    Weight management plan: Patient was referred to their PCP to discuss a diet and exercise plan.      Again, thank you for allowing me to participate in the care of your patient.        Sincerely,        Татьяна Mckeon DPM, Podiatry/Foot and Ankle Surgery    "

## 2020-05-07 ENCOUNTER — VIRTUAL VISIT (OUTPATIENT)
Dept: ENDOCRINOLOGY | Facility: CLINIC | Age: 70
End: 2020-05-07
Payer: COMMERCIAL

## 2020-05-07 DIAGNOSIS — E11.00 TYPE 2 DIABETES MELLITUS WITH HYPEROSMOLARITY WITHOUT COMA, WITH LONG-TERM CURRENT USE OF INSULIN (H): ICD-10-CM

## 2020-05-07 DIAGNOSIS — Z79.4 TYPE 2 DIABETES MELLITUS WITH HYPEROSMOLARITY WITHOUT COMA, WITH LONG-TERM CURRENT USE OF INSULIN (H): ICD-10-CM

## 2020-05-07 PROCEDURE — 99203 OFFICE O/P NEW LOW 30 MIN: CPT | Mod: 95 | Performed by: INTERNAL MEDICINE

## 2020-05-07 RX ORDER — FLASH GLUCOSE SENSOR
1 KIT MISCELLANEOUS DAILY
Qty: 2 EACH | Refills: 11 | Status: SHIPPED | OUTPATIENT
Start: 2020-05-07 | End: 2020-07-16

## 2020-05-07 RX ORDER — FLASH GLUCOSE SCANNING READER
1 EACH MISCELLANEOUS DAILY
Qty: 1 DEVICE | Refills: 1 | Status: SHIPPED | OUTPATIENT
Start: 2020-05-07 | End: 2020-07-16

## 2020-05-07 NOTE — NURSING NOTE
05/07 1030 214  05/06 1030 260  05/05 1030 260    He says it's hard to get blood from his finger and doesn't check often.  Had toe amputation on 04/18/20.    Iza Chow CMA  [facility]  [place]

## 2020-05-07 NOTE — PROGRESS NOTES
"This is a telephone encounter.  NEW PATIENT   Declined video visit.    Start time: 1:12    End time: 1:36    More than 10 min spent reviewing chart, labs, results, imaging studies and in patient communication.    In the setting of COVID-19 outbreak patients visits are transitioned to phone visits/ virtual visits as per system and leadership guidelines.  I have personally reviewed data including notes, lab tests. I have reviewed and interpreted images personally.  This encounter is potentially equivalent to NEW 4 level (92355) clinic visit.    The patient has been notified of following:      \"This telephone visit will be conducted via a call between you and your physician/provider. We have found that certain health care needs can be provided without the need for a physical exam.  This service lets us provide the care you need with a short phone conversation.  If a prescription is necessary we can send it directly to your pharmacy.  If lab work is needed we can place an order for that and you can then stop by our lab to have the test done at a later time.     We will bill your insurance company for this service.  Please check with your medical insurance if this type of visit is covered. You may be responsible for the cost of this type of visit if insurance coverage is denied.  The typical cost is $30 (10min), $59 (11-20min) and $85 (21-30min).  Most often these visits are shorter than 10 minutes. With new updates with corona virus patient might be billed as clinic visit.     If during the course of the call the physician/provider feels a telephone visit is not appropriate, you will not be charged for this service.\"      Past medical history, social history, family history, allergy and medications were reviewed and updated as appropriate.  Reviewed all pertinent labs, notes and imaging studies as appropriate.    Patient verbally consented to phone visit today: yes.    Disclaimer: This note consists of symbols derived " from keyboarding, dictation and/or voice recognition software. As a result, there may be errors in the script that have gone undetected. Please consider this when interpreting information found in this chart.        ROS neg expect noted in notes.  Patient feels well at this time and denies any tachycardia, palpitations, heat intolerance, tremor, insomnia, diarrhea, or unexplained weight loss.  Patient also denies  cold intolerance, constipation, or unexplained weight gain.   ENDOCRINOLOGY CLINIC NOTE:  Name: Jayro Elder  Seen in consultation with Davion Cordova for Diabetes.  HPI:  Jayro Elder is a 69 year old male who presents for the evaluation/management of Diabetes.   has a past medical history of CAD (coronary artery disease) (6/29/05), Cancer (H), Cardiomyopathy (H), Cellulitis of left lower extremity (3/13/2018), Cerebral infarction (H), Diabetic ulcer of left midfoot associated with type 2 diabetes mellitus, with fat layer exposed (H) (3/13/2018), Essential hypertension, benign (11/13/2002), Generalized osteoarthrosis, unspecified site (11/13/2002), Microalbuminuria (3/13/2018), Myocardial infarction (H), Neuropathy, Sepsis (H), Sleep apnea, Substance abuse (H), Syncope, unspecified syncope type (3/13/2018), Thyroid disease, Tobacco use disorder (11/13/2002), and Type 2 diabetes mellitus with diabetic peripheral angiopathy without gangrene, unspecified long term insulin use status (2005).    He also has chronic afib (on AC), and known coronary disease with prior MI/stenting and resultant ischemic CMO with chronic EF of 35-40%. H/o CVA per his report. Confirmed on MRI with his neurology team.   Also followed by podiatry gangrene of toe and is s/p left 2nd toe amputation.    Reports that he is doing well.  Incision healing well.      1. Type 2 DM:  Orginally diagnosed at the age of: in 40s. On insulin X 10 years.  Current Regimen:   LANTUS 35 Units in AM. Novolog baseline  8/8/10 Units with  breakfast/lunch/dinner respectively + on sliding scale insulin ( but not using it). Mostly estimating about 10-14 Units with each meal.    Diabetes Medication(s)     Insulin       insulin aspart (NOVOLOG FLEXPEN) 100 UNIT/ML pen    Inject 8 Units Subcutaneous daily (with lunch)     insulin aspart (NOVOLOG FLEXPEN) 100 UNIT/ML pen    Inject 10 Units Subcutaneous daily (with dinner)     insulin aspart (NOVOLOG FLEXPEN) 100 UNIT/ML pen    Inject Subcutaneous 3 times daily (with meals) Sliding Scale  Do not give sliding scale insulin if Pre-Meal BG less than 140.   For Pre-Meal:   - 200 give 2 units.    - 250 give 4 units.    - 300 give 6 units.    - 350 give 8 units.    - 400 give 10 units.     insulin aspart (NOVOLOG PEN) 100 UNIT/ML pen    Inject 8 Units Subcutaneous daily (with breakfast)      insulin glargine (LANTUS SOLOSTAR PEN) 100 UNIT/ML pen    Inject 36 Units Subcutaneous every morning        Metformin- Not able to tolerate 2/2 to GI.  Also tried Toujeo but did not feel well on it.    BS checks: once a day (FBG)  Average Meter Download: mostly high in 200s. Limited Bg data available.  Exercise: not much.  Last A1c: 10.4%  Symptoms of hypoglycemia (low blood sugar):  Gets symptoms of hypoglycemia.  Episodes of hypoglycemia: rare    DM Complications:   Complications:   Diabetes Complications  Description / Detail    Diabetic Retinopathy   + DR per his report.   Problems with vision after strokes.   CAD / PAD   CAD and CVA.   Neuropathy  + neuropathy and s/p toe amputations   Nephropathy / Microalbuminuria  No  Lab Results   Component Value Date    UMALCR 49.68 03/12/2018         Gastroparesis  No   Hypoglycemia [place]  No       2. Hypertension:    on medications  3. 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     Cardiomyopathy (H)      Cellulitis of left lower extremity 3/13/2018     Cerebral infarction (H)     eye sight decreased in peripheral of right side and blurry     Diabetic ulcer of left midfoot associated with type 2 diabetes mellitus, with fat layer exposed (H) 3/13/2018     Essential hypertension, benign 11/13/2002     Generalized osteoarthrosis, unspecified site 11/13/2002     Microalbuminuria 3/13/2018    X1     Myocardial infarction (H)      Neuropathy      Sepsis (H)      Sleep apnea     doesn't use it     Substance abuse (H)      Syncope, unspecified syncope type 3/13/2018     Thyroid disease     takes medicine     Tobacco use disorder 11/13/2002     Type 2 diabetes mellitus with diabetic peripheral angiopathy without gangrene, unspecified long term insulin use status 2005     Past Surgical History:   Procedure Laterality Date     AMPUTATE TOE(S) Right 1/6/2019    Procedure: Right open second toe partial amputation;  Surgeon: Sabino Garcia DPM;  Location: [facility] OR     AMPUTATE TOE(S) Right 1/8/2019    Procedure: 1.  Revision right second toe amputation with resection of distal half of proximal phalanx with full closure.    2.  Debridement of callus/preulcerative lesion, distal left second toe and plantar left first metatarsophalangeal joint.;  Surgeon: Ryan Bhagat DPM;  Location: [facility] OR     AMPUTATE TOE(S) Right 3/12/2019    Procedure: Partial right fourth toe amputation.;  Surgeon: Ryan Bhagat DPM;  Location: [facility] OR     AMPUTATE TOE(S) Right 5/6/2019    Procedure: Right partial third toe amputation;  Surgeon: Татьяна Mckeon DPM, Podiatry/Foot and Ankle Surgery;  Location: [facility] OR     AMPUTATE TOE(S) Left 4/18/2020    Procedure: LEFT SECOND TOE AMPUTATION;  Surgeon: Ryan Bhagat DPM;  Location: [facility] OR     ANGIOGRAM  6/29/05    subtotal occ.mid LAD,SUSAN mid LAD     ANGIOGRAM  7/05    mild CAD,patent stent,no flow-limiting lesions,sev.LV dysf.LAD enlarged     ANGIOGRAM   2/09    Sev.single vessel disease,Mod LV dysf.distal LAD 90%,70-75% mid lad just before prev stent,SUSAN to prox.mid LAD, endeavor to distal LAD     ANGIOGRAM  11/13/13    restenosis, stent LAD     BACK SURGERY      back surgery x3     C NONSPECIFIC PROCEDURE      Laminectomy x 3 - (1983 x 2 & 1990)     C NONSPECIFIC PROCEDURE Bilateral 1998    Bunionectomy     C NONSPECIFIC PROCEDURE  1959    Gingival surgery at age 9     CV CORONARY ANGIOGRAM N/A 7/8/2019    Procedure: Coronary Angiogram;  Surgeon: Jose Alfredo Crockett MD;  Location:  [facility]     CV LEFT HEART CATH N/A 7/8/2019    Procedure: Left Heart Cath;  Surgeon: Jose Alfredo Crockett MD;  Location:  [facility]     CV PCI ASPIRATION THROMECTOMY N/A 7/8/2019    Procedure: PCI Aspiration Thrombectomy;  Surgeon: Jose Alfredo Crockett MD;  Location:  [facility]     CV PCI STENT DRUG ELUTING N/A 7/8/2019    Procedure: PCI Stent Drug Eluting;  Surgeon: Jose Alfredo Crockett MD;  Location:  [facility]ATH [facility]     HEART CATH LEFT HEART CATH  06/13/2017    SUSAN to OM3     INCISION AND DRAINAGE TOE, COMBINED Bilateral 9/11/2018    Procedure: COMBINED INCISION AND DRAINAGE TOE;  1) Irrigation and debridement ulcer left great toe  2) Amputation 3rd toe right foot at distal interphalangeal joint level;  Surgeon: Miki Harp MD;  Location:  OR     IRRIGATION AND DEBRIDEMENT FOOT, COMBINED Left 3/12/2019    Procedure: Debridement of left foot ulcer sub first MPJ 2 x 2.5 cm down to and including subcutaneous tissue, callus debridement for preulcerative lesion, left second toe.;  Surgeon: Ryan Bhagat DPM;  Location:  OR     ORTHOPEDIC SURGERY      bunion surgery both feet     ORTHOPEDIC SURGERY      left shoulder     SOFT TISSUE SURGERY      debridement of toe numerous time     VASCULAR SURGERY      7 stents in heart     Family Hx:  Family History   Problem Relation Age of Onset     Cancer - colorectal Sister       Thyroid Disease Brother      Cardiovascular Brother      Diabetes Brother      Cancer Father         tumor in chest and throat     Arthritis Mother      Thyroid Disease Mother      Diabetes Mother      Glaucoma No family hx of      Macular Degeneration No family hx of        Diabetes: brother with DM    Social Hx:  Social History     Socioeconomic History     Marital status:      Spouse name: Not on file     Number of children: Not on file     Years of education: Not on file     Highest education level: Not on file   Occupational History     Not on file   Social Needs     Financial resource strain: Not on file     Food insecurity     Worry: Not on file     Inability: Not on file     Transportation needs     Medical: Not on file     Non-medical: Not on file   Tobacco Use     Smoking status: Former Smoker     Packs/day: 1.00     Years: 25.00     Pack years: 25.00     Types: Cigarettes     Start date:      Last attempt to quit: 2005     Years since quittin.7     Smokeless tobacco: Never Used   Substance and Sexual Activity     Alcohol use: Yes     Alcohol/week: 4.0 standard drinks     Types: 4 Shots of liquor per week     Frequency: Never     Comment: OCCASSIONALLY      Drug use: No     Sexual activity: Yes     Partners: Female   Lifestyle     Physical activity     Days per week: Not on file     Minutes per session: Not on file     Stress: Not on file   Relationships     Social connections     Talks on phone: Not on file     Gets together: Not on file     Attends [employer] service: Not on file     Active member of club or organization: Not on file     Attends meetings of clubs or organizations: Not on file     Relationship status: Not on file     Intimate partner violence     Fear of current or ex partner: Not on file     Emotionally abused: Not on file     Physically abused: Not on file     Forced sexual activity: Not on file   Other Topics Concern     Parent/sibling w/ CABG, MI or angioplasty  before 65F 55M? Yes      Service Not Asked     Blood Transfusions Not Asked     Caffeine Concern No     Comment: 3 cups daily     Occupational Exposure Not Asked     Hobby Hazards Not Asked     Sleep Concern Not Asked     Stress Concern Not Asked     Weight Concern Not Asked     Special Diet Yes     Comment: watching carb intake     Back Care Not Asked     Exercise No     Comment: some walking     Bike Helmet Not Asked     Seat Belt Not Asked     Self-Exams Not Asked   Social History Narrative     Not on file          MEDICATIONS:  has a current medication list which includes the following prescription(s): apixaban anticoagulant, aspirin not prescribed, atorvastatin, carvedilol, clopidogrel, fluticasone, furosemide, gabapentin, insulin aspart, insulin aspart, insulin aspart, insulin aspart, insulin glargine, insulin pen needle, isosorbide mononitrate, levothyroxine, lisinopril, nitroglycerin, order for dme, order for dme, pantoprazole, and spironolactone.    ROS     ROS: 10 point ROS neg other than the symptoms noted above in the HPI.    Physical Exam   VS: There were no vitals taken for this visit.      LABS:  A1c:  Lab Results   Component Value Date    A1C 10.4 04/17/2020    A1C 8.8 07/06/2019    A1C 9.0 05/05/2019    A1C 11.7 01/05/2019    A1C 9.4 09/06/2018       Creatinine:  Creatinine   Date Value Ref Range Status   04/22/2020 1.08 0.66 - 1.25 mg/dL Final   ]    Urine Micro:  Lab Results   Component Value Date    MICROL 154 03/12/2018     No results found for: MICROALBUMIN      LFTs/Lipids:  Lab Results   Component Value Date    CHOL 106 07/08/2019     Lab Results   Component Value Date    HDL 46 07/08/2019     Lab Results   Component Value Date    LDL 43 07/08/2019     Lab Results   Component Value Date    TRIG 84 07/08/2019     Lab Results   Component Value Date    CHOLHDLRATIO 2.3 02/02/2015       TFTs:  TSH   Date Value Ref Range Status   10/09/2019 1.80 0.40 - 4.00 mU/L Final   ]        All  pertinent notes, labs, and images personally reviewed by me.     Glucometer/ insulin pump (if applicable)/ CGM data (if applicable) downloaded, Personally reviewed and interpreted.  All Blood sugar data reviewed personally and discussed with pt.  See nursing note from 5/7/2020 for details of BG/CGM log.      A/P  Mr.Ralph MAYURI Elder is a 69 year old here for the evaluation/management of diabetes:    1. DM2 - Under Poor control.  A1c 10.4% in the setting of anemia.   Complex medical history as noted above.  Joey is not covered per his report.  Not able to tolerate metformin.  Plan:  Discussed diagnosis, pathophysiology, management and treatment options of condition with pt.  I also discussed importance of strict blood sugar control to prevent complications associated with uncontrolled diabetes.  Increase Lantus to 42 Units/day.  Continue Novolog at current dose- but encouraged to check before meals and add sliding scale accordingly.  CDE referral for DM education and insulin titration.  Joey Rx sent. He will check with insurnace.     2. Hypertension - On medication      3. Hyperlipidemia - Under Fair control.  On lipitor 40 mg/day     4. Hypothyroidism:  On medication. Managed by PCP.  Not discussed.    5. Prevention:  Opthalmology-   ASA- ON Eliquis and Plavix.  Smoking-     Most Recent Immunizations   Administered Date(s) Administered     Flu, Unspecified 10/21/2008     Influenza (H1N1) 01/29/2010     Influenza (High Dose) 3 valent vaccine 01/06/2020     Influenza (IIV3) PF 11/17/2009     Influenza Vaccine IM > 6 months Valent IIV4 11/14/2013     Pneumo Conj 13-V (2010&after) 06/17/2017     Pneumococcal 23 valent 06/26/2019     TDAP Vaccine (Adacel) 06/23/2017   Deferred Date(s) Deferred     Influenza (High Dose) 3 valent vaccine 03/14/2019         Recommend checking blood sugars before meals and at bedtime.    If Blood glucose are low more often-> 2-3 times/week- give us a call.  The patient is advised to Make  better food choices: reduce carbs, Reduce portion size, weight loss and exercise 3-4 times a week.  Discussed hypoglycemia signs and symptoms as well as management in detail.    There is some variability among people, most will usually develop symptoms suggestive of hypoglycemia when blood glucose levels are lowered to the mid 60's. The first set of symptoms are called adrenergic. Patients may experience any of the following nervousness, sweating, intense hunger, trembling, weakness, palpitations, and difficulty speaking.   The acute management of hypoglycemia involves the rapid delivery of a source of easily absorbed sugar. Regular soda, juice, lifesavers, table sugar, are good options. 15 grams of glucose is the dose that is given, followed by an assessment of symptoms and a blood glucose check if possible. If after 10 minutes there is no improvement, another 10-15 grams should be given. This can be repeated up to three times. The equivalency of 10-15 grams of glucose (approximate servings) are: Four lifesavers, 4 teaspoons of sugar, or 1/2 can of regular soda or juice.     All questions were answered.  The patient indicates understanding of the above issues and agrees with the plan set forth.     Follow-up:  3 months.    Sarah Bolivar M.D  Endocrinology  [facility]/[place]  CC: Davion Cordova    Disclaimer: This note consists of symbols derived from keyboarding, dictation and/or voice recognition software. As a result, there may be errors in the script that have gone undetected. Please consider this when interpreting information found in this chart.    Addendum to above note and clinic visit:    Labs reviewed.    See result note/telephone encounter.

## 2020-05-11 ENCOUNTER — TELEPHONE (OUTPATIENT)
Dept: CARDIOLOGY | Facility: CLINIC | Age: 70
End: 2020-05-11

## 2020-05-11 DIAGNOSIS — I25.119 CORONARY ARTERY DISEASE INVOLVING NATIVE CORONARY ARTERY OF NATIVE HEART WITH ANGINA PECTORIS (H): Primary | ICD-10-CM

## 2020-05-11 NOTE — TELEPHONE ENCOUNTER
Left VM for pt to see how he is feeling and get a BP update.   Reviewed [facility] notes:   Services ended 5/7. Last 2 bps were 120/78, HR 77 & 128/80, HR 83. Pt had no complaints per notes.   Messaged scheduling to reach out to pt to make Follow-up CORE visit w/BMP & FLP/ALT prior.  Princess Chapa RN 12:26 PM 05/11/20

## 2020-05-11 NOTE — TELEPHONE ENCOUNTER
"----- Message from Princess Chapa RN sent at 4/28/2020  1:38 PM CDT -----  Regarding: FW: phone update and labs    ----- Message -----  From: Gill Doty PA  Sent: 4/27/2020   2:49 PM CDT  To: Princess Chapa RN  Subject: phone update and labs                            Well, at times he adjusts his medications on his own, but I think I have them all straight.  I resumed lisinopril at 20mg daily today.  Please call him in ~2 weeks and get a BP update.    I\"d like to do another CORE phone visit with myself in 4-6 weeks. Please arrange this along with labs prior. I did a BMP and then added an FLP as well as he is due for annual lipids. Let him know he will need to fast for those.    Thanks  Gill      "

## 2020-05-15 ENCOUNTER — TELEPHONE (OUTPATIENT)
Dept: ENDOCRINOLOGY | Facility: CLINIC | Age: 70
End: 2020-05-15

## 2020-05-15 NOTE — TELEPHONE ENCOUNTER
Diabetes Education Scheduling Outreach #1:    Call to patient to schedule. Patient declined to schedule.    N[place]ole [place]  [facility]  Diabetes and Nutrition Scheduling

## 2020-05-26 ENCOUNTER — TELEPHONE (OUTPATIENT)
Dept: CARDIOLOGY | Facility: CLINIC | Age: 70
End: 2020-05-26

## 2020-05-26 DIAGNOSIS — Z79.4 TYPE 2 DIABETES MELLITUS WITH DIABETIC NEPHROPATHY, WITH LONG-TERM CURRENT USE OF INSULIN (H): ICD-10-CM

## 2020-05-26 DIAGNOSIS — G25.81 RESTLESS LEGS SYNDROME (RLS): ICD-10-CM

## 2020-05-26 DIAGNOSIS — E11.21 TYPE 2 DIABETES MELLITUS WITH DIABETIC NEPHROPATHY, WITH LONG-TERM CURRENT USE OF INSULIN (H): ICD-10-CM

## 2020-05-27 RX ORDER — GABAPENTIN 100 MG/1
200 CAPSULE ORAL AT BEDTIME
Qty: 60 CAPSULE | Refills: 0 | Status: SHIPPED | OUTPATIENT
Start: 2020-05-27 | End: 2020-06-19

## 2020-05-27 NOTE — PROGRESS NOTES
CARDIOLOGY [facility] TELEPHONE VISIT    Jayro Elder is a 69 year old male who is being evaluated via a billable telephone visit.      The patient has been notified of following:     This telephone visit will be conducted via a call between you and your physician/provider. Given concern for spread of COVID 19 we are minimizing in person clinic visits when possible. We have found that certain health care needs can be provided without the need for a physical exam.  This service lets us provide the care you need with a short phone conversation.  If a prescription is necessary we can send it directly to your pharmacy.  If lab work is needed we can place an order for that and you can then stop by our lab to have the test done at a later time. If during the course of the call the physician/provider feels a telephone visit is not appropriate, you will not be charged for this service.    Telephone visits are billed at different rates depending on your insurance coverage. During this emergency period, for some insurers they may be billed the same as an in-person visit.  Please reach out to your insurance provider with any questions.    Patient has given verbal consent for Telephone visit?  Yes      I have reviewed and updated the patient's Past Medical History, Social History, Family History and Medication List.      MEDICATIONS:  Current Outpatient Medications   Medication Sig Dispense Refill     apixaban ANTICOAGULANT (ELIQUIS) 5 MG tablet Take 1 tablet (5 mg) by mouth 2 times daily       atorvastatin (LIPITOR) 40 MG tablet Take 1 tablet (40 mg) by mouth every evening 90 tablet 0     carvedilol (COREG) 25 MG tablet Take 1 tablet (25 mg) by mouth 2 times daily (with meals)       clopidogrel (PLAVIX) 75 MG tablet Take 1 tablet (75 mg) by mouth daily 90 tablet 3     Continuous Blood Gluc  (Refinery29YLE CLARITA 14 DAY READER) DRAGAN 1 each daily For clarita personal continuous glucose monitor system. Sensors are changed  every 14 days( 2 sensors--> 1 month). 1 reader. 1 Device 1     Continuous Blood Gluc Sensor (FREESTYLE CLARITA 14 DAY SENSOR) MISC 1 each daily For use with Freestyle Clarita reader for continuous monitioring of blood glucose levels along with sensor. Replace sensor every 14 days. 2 each 11     furosemide (LASIX) 40 MG tablet Take 1 tablet (40 mg) by mouth daily 90 tablet 3     gabapentin (NEURONTIN) 100 MG capsule TAKE 2 CAPSULES (200 MG) BY MOUTH AT BEDTIME (Patient taking differently: Take 400 mg by mouth At Bedtime ) 60 capsule 0     insulin aspart (NOVOLOG FLEXPEN) 100 UNIT/ML pen Inject 8 Units Subcutaneous daily (with lunch)       insulin aspart (NOVOLOG FLEXPEN) 100 UNIT/ML pen Inject 10 Units Subcutaneous daily (with dinner)       insulin aspart (NOVOLOG FLEXPEN) 100 UNIT/ML pen Inject Subcutaneous 3 times daily (with meals) Sliding Scale  Do not give sliding scale insulin if Pre-Meal BG less than 140.   For Pre-Meal:   - 200 give 2 units.    - 250 give 4 units.    - 300 give 6 units.    - 350 give 8 units.    - 400 give 10 units.       insulin aspart (NOVOLOG PEN) 100 UNIT/ML pen Inject 8 Units Subcutaneous daily (with breakfast)        insulin glargine (LANTUS PEN) 100 UNIT/ML pen Inject 42 Units Subcutaneous every morning 30 mL 1     insulin pen needle 31G X 6 MM Use  as directed. 100 each prn     isosorbide mononitrate (IMDUR) 60 MG 24 hr tablet Take 1 tablet (60 mg) by mouth daily       levothyroxine (SYNTHROID, LEVOTHROID) 137 MCG tablet Take 137 mcg by mouth daily  90 tablet 3     lisinopril (ZESTRIL) 40 MG tablet Take 1 tablet (40 mg) by mouth daily       nitroglycerin (NITROSTAT) 0.4 MG sublingual tablet Place 1 tablet (0.4 mg) under the tongue every 5 minutes as needed for chest pain 50 tablet 0     order for DME Equipment being ordered: size 12 surgical shoe 1 Device 0     order for DME Equipment being ordered: Walker Wheels () and Walker ()  Treatment  Diagnosis: Impaired gait, heel WB bilaterally. 1 each 0     pantoprazole (PROTONIX) 40 MG enteric coated tablet Take 1 tablet (40 mg) by mouth every morning (before breakfast) 30 tablet 0     spironolactone (ALDACTONE) 25 MG tablet Take 1 tablet (25 mg) by mouth daily       ASPIRIN NOT PRESCRIBED (INTENTIONAL) Antiplatelet medication not prescribed intentionally due to Current anticoagulant therapy (warfarin/enoxaparin)         ALLERGIES  No known allergies      Review Of Systems:    Skin: negative  Eyes: positive for visual blurring not able to get injections  Ears/Nose/Throat: negative  Respiratory: Shortness of breath- at times when walking around backyard a lot  Cardiovascular: positive for a little bilateral ankle edema, but thinks back to baseline  Gastrointestinal: negative  Genitourinary: negative  Musculoskeletal: negative  Neurologic: negative  Psychiatric: negative  Hematologic/Lymphatic/Immunologic: negative  Endocrine: positive for Hypothyroidism  (ROS TAKEN BY Rosio Estrella RN PRIOR TO VISIT)    Self reported vitals:  Weight: 257 lbs  /77 (sitting) and 139/98 (standing)  HR NA    Most recent labs:   Results for PORTER YANCEY (MRN [id number]) as of 5/28/2020 12:52   Ref. Range 4/22/2020 10:55   Sodium Latest Ref Range: 133 - 144 mmol/L 134   Potassium Latest Ref Range: 3.4 - 5.3 mmol/L 4.6   Chloride Latest Ref Range: 94 - 109 mmol/L 103   Carbon Dioxide Latest Ref Range: 20 - 32 mmol/L 29   Urea Nitrogen Latest Ref Range: 7 - 30 mg/dL 21   Creatinine Latest Ref Range: 0.66 - 1.25 mg/dL 1.08   GFR Estimate Latest Ref Range: >60 mL/min/1.73_m2 69   GFR Estimate If Black Latest Ref Range: >60 mL/min/1.73_m2 80   Calcium Latest Ref Range: 8.5 - 10.1 mg/dL 8.7   Anion Gap Latest Ref Range: 3 - 14 mmol/L 2 (L)   N-Terminal Pro Bnp Latest Ref Range: 0 - 125 pg/mL 512 (H)        Primary cardiologist: Dr. Justin Tomlin      HPI:  Mr. Yancey is a 68 year old male with a PMHx including  "DM2, hypothyroidism, hypertension, CVA, untreated MARISOL, and chronic renal dysfunction. He also has longstanding afib (on AC), and known coronary disease with prior MI/stenting and resultant ischemic CMO with chronic EF of 35-40%. He also struggles with peripheral wounds from his diabetes. In Jan 2019 he was admitted for osteomyelitis and his stay was complicated by ABNER with IV antibiotics and his long term clopidogrel was stopped at that time. He presented back shortly after, with weight gain and ANDREWS. He was again hospitalized and treated for a CHF exacerbation, and was diuresed ~30 pounds. Echo showed no new WMAs and his EF remained in the 35-40% range. He was again hospitalized in March 2019 for right foot cellulitis and right toe osteomyelitis. He got IV abx and underwent partial toe amputation as well as I+D of his left foot ulcer. During that stay, he did not have an exacerbation of his heart failure, and his atrial fibrillation remained under good control.      In early July 2019 he was admitted again for evaluation of chest pain. He underwent SUSAN x 3 to the RCA. Echo showed EF remained 35-40% without significant changes. He returned a few weeks later with atypical chest pain, and medical management was recommended as repeat stress test did not show any new ischemia. I have since been following him periodically in [facility] clinic, but we had been spacing out visits at his request due to to finances/transportation. I saw him last formally in Nov 2019 at which time he felt he was at his baseline and no changes were made.    More recently, in March of 2020 he called to report low BPs at home with orthostasis (as low as 80/60). We have had a few virtual visits since that time for medication adjustments. I temporarily held most of his cardiac medications, and shortly after he required a few extra doses of Lasix due to some leg swelling. As he endorsed some new chest tightness and \"just not feeling good,\" we performed a " "stress test in mid April which showed EF 37% with akinesis of mid to distal anterior, anteroseptal and apical walls. There was also a medium medium sized area of a severe degree of infarction in the mid to distal anterior, apical and anteroseptal segment(s) of the left ventricle.  There was no ischemia is identified, and notably, this was not significant changed when compared to July of 2019. Ultimately, he was again found to have a gangrene toe and underwent an amputation around the same time.     Since that time he has slowly started to feel better, and I have slowly reintroduced his cardiac medications back. Today, we are having another virtual telephone visit. Since we spoke last, he tells me he's been feeling better, close to his \"usual.\" SBPs are in the low 140s at home. His dizziness and chest tightness have essentially resolved. He denies any new chest discomfort. Weight is up about 6 lbs since we spoke last but he denies any increased leg edema and thinks he's just eating more while stuck at home. There were no new labs prior to today's visit.       Assessment/Plan:    1. Coronary artery disease.              --Long cardiac hx including MI in 2005 with proximal LAD stenting, repeat LAD stenting in 2009 and distal LAD stenting in 2013. In June 2017 he had stent placement to an obtuse marginal. In July 2019, he was found to have progression of his disease and underwent SUSAN x 3 to the RCA. He returned a few weeks later with atypical CP in later July 2019 and Lexiscan did not show new ischemia. Thus, we have been medically managing him.              --He has had some recent issues including transient hypotension and brief volume overload at home along with some tightness in his chest. Repeat lexiscan as above, is abnormal, but unchanged from July 2019. Overall, he is now feeling better after amputation of a gangrenous toe.               --Continue Imdur, 60mg daily.               --Continue Plavix with his " Eliquis as below,              --Continue atorvastatin 40mg at bedtime. Last LDL under excellent control at 43, July 2019. Plan routine repeat fasting labs later this summer when he returns for annual MD visit.      2.  Chronic systolic heart failure, known ischemic cardiomyopathy.              --Known chronic EF 35-40%, which remained unchanged per last echo July 2019 when he returned with angina, requiring repeat intervention.               --He had transient hypotension with orthostatic symptoms which was likely likely secondary to his gangrenous toe. He is now back on full dose carvedilol 25mg BID along with his spironolactone 25mg daily and tolerating well.  Now that his BP has rebounded, will increase lisinopril back to 40mg daily as well.  Historically his renal function has remained preserved.               --Will continue the Lasix at 40mg daily. Per his report, his swelling is gone, despite some weight gain that he believes is related to excess calories and less physical activity.     3. Chronic atrial fibrillation.              --Appears rate controlled, self reports heart rates are in the 70s. Holter performed in April showed average HR 88 with chronic afib.               --Continue Eliquis 5mg BID.      Follow up plan:  Plan return in ~3 months to see Dr. Tomlin for annual visit, with fasting labs.       I have reviewed the note as documented above.  This accurately captures the substance of my conversation with the patient.      Phone call contact time  Call Started at 1253  Call Ended at [phone]    Gill Doty PA-C  [facility]  Pager [phone]

## 2020-05-28 ENCOUNTER — VIRTUAL VISIT (OUTPATIENT)
Dept: CARDIOLOGY | Facility: CLINIC | Age: 70
End: 2020-05-28
Attending: PHYSICIAN ASSISTANT
Payer: COMMERCIAL

## 2020-05-28 DIAGNOSIS — I50.22 CHRONIC SYSTOLIC CONGESTIVE HEART FAILURE (H): ICD-10-CM

## 2020-05-28 DIAGNOSIS — I25.119 CORONARY ARTERY DISEASE INVOLVING NATIVE CORONARY ARTERY OF NATIVE HEART WITH ANGINA PECTORIS (H): ICD-10-CM

## 2020-05-28 PROCEDURE — 99213 OFFICE O/P EST LOW 20 MIN: CPT | Mod: 95 | Performed by: PHYSICIAN ASSISTANT

## 2020-05-28 RX ORDER — LISINOPRIL 40 MG/1
40 TABLET ORAL DAILY
COMMUNITY
Start: 2020-05-28 | End: 2020-06-16

## 2020-05-28 NOTE — LETTER
5/28/2020    Davion Cordova PA-C  [id number]  [facility] [id number]    RE: Jayro Elder       Dear Colleague,    I had the pleasure of seeing Jayro Elder in the [facility].    CARDIOLOGY [facility]INIC TELEPHONE VISIT    Jayro Elder is a 69 year old male who is being evaluated via a billable telephone visit.      Mr. Elder is a 68 year old male with a PMHx including DM2, hypothyroidism, hypertension, CVA, untreated MARISOL, and chronic renal dysfunction. He also has longstanding afib (on AC), and known coronary disease with prior MI/stenting and resultant ischemic CMO with chronic EF of 35-40%. He also struggles with peripheral wounds from his diabetes. In Jan 2019 he was admitted for osteomyelitis and his stay was complicated by ABNER with IV antibiotics and his long term clopidogrel was stopped at that time. He presented back shortly after, with weight gain and ANDREWS. He was again hospitalized and treated for a CHF exacerbation, and was diuresed ~30 pounds. Echo showed no new WMAs and his EF remained in the 35-40% range. He was again hospitalized in March 2019 for right foot cellulitis and right toe osteomyelitis. He got IV abx and underwent partial toe amputation as well as I+D of his left foot ulcer. During that stay, he did not have an exacerbation of his heart failure, and his atrial fibrillation remained under good control.      In early July 2019 he was admitted again for evaluation of chest pain. He underwent SUSAN x 3 to the RCA. Echo showed EF remained 35-40% without significant changes. He returned a few weeks later with atypical chest pain, and medical management was recommended as repeat stress test did not show any new ischemia. I have since been following him periodically in [facility] clinic, but we had been spacing out visits at his request due to to finances/transportation. I saw him last formally in Nov 2019 at which time he felt he was at his baseline and  "no changes were made.    More recently, in March of 2020 he called to report low BPs at home with orthostasis (as low as 80/60). We have had a few virtual visits since that time for medication adjustments. I temporarily held most of his cardiac medications, and shortly after he required a few extra doses of Lasix due to some leg swelling. As he endorsed some new chest tightness and \"just not feeling good,\" we performed a stress test in mid April which showed EF 37% with akinesis of mid to distal anterior, anteroseptal and apical walls. There was also a medium medium sized area of a severe degree of infarction in the mid to distal anterior, apical and anteroseptal segment(s) of the left ventricle.  There was no ischemia is identified, and notably, this was not significant changed when compared to July of 2019. Ultimately, he was again found to have a gangrene toe and underwent an amputation around the same time.     Since that time he has slowly started to feel better, and I have slowly reintroduced his cardiac medications back. Today, we are having another virtual telephone visit. Since we spoke last, he tells me he's been feeling better, close to his \"usual.\" SBPs are in the low 140s at home. His dizziness and chest tightness have essentially resolved. He denies any new chest discomfort. Weight is up about 6 lbs since we spoke last but he denies any increased leg edema and thinks he's just eating more while stuck at home. There were no new labs prior to today's visit.       Assessment/Plan:    1. Coronary artery disease.              --Long cardiac hx including MI in 2005 with proximal LAD stenting, repeat LAD stenting in 2009 and distal LAD stenting in 2013. In June 2017 he had stent placement to an obtuse marginal. In July 2019, he was found to have progression of his disease and underwent SUSAN x 3 to the RCA. He returned a few weeks later with atypical CP in later July 2019 and Lexiscan did not show new ischemia. " Thus, we have been medically managing him.              --He has had some recent issues including transient hypotension and brief volume overload at home along with some tightness in his chest. Repeat lexiscan as above, is abnormal, but unchanged from July 2019. Overall, he is now feeling better after amputation of a gangrenous toe.               --Continue Imdur, 60mg daily.               --Continue Plavix with his Eliquis as below,              --Continue atorvastatin 40mg at bedtime. Last LDL under excellent control at 43, July 2019. Plan routine repeat fasting labs later this summer when he returns for annual MD visit.      2.  Chronic systolic heart failure, known ischemic cardiomyopathy.              --Known chronic EF 35-40%, which remained unchanged per last echo July 2019 when he returned with angina, requiring repeat intervention.               --He had transient hypotension with orthostatic symptoms which was likely likely secondary to his gangrenous toe. He is now back on full dose carvedilol 25mg BID along with his spironolactone 25mg daily and tolerating well.  Now that his BP has rebounded, will increase lisinopril back to 40mg daily as well.  Historically his renal function has remained preserved.               --Will continue the Lasix at 40mg daily. Per his report, his swelling is gone, despite some weight gain that he believes is related to excess calories and less physical activity.     3. Chronic atrial fibrillation.              --Appears rate controlled, self reports heart rates are in the 70s. Holter performed in April showed average HR 88 with chronic afib.               --Continue Eliquis 5mg BID.      Follow up plan:  Plan return in ~3 months to see Dr. Tomlin for annual visit, with fasting labs.       I have reviewed the note as documented above.  This accurately captures the substance of my conversation with the patient.      Phone call contact time  Call Started at 1253  Call Ended at  [phone]    Gill Doty PA-C  [facility]  Pager [phone]      Thank you for allowing me to participate in the care of your patient.    Sincerely,     CONCEPCION Dasilva     [facility]

## 2020-05-28 NOTE — PATIENT INSTRUCTIONS
Call CORE nurse for any questions or concerns Mon-Fri 8am-4pm:                                                  [phone]                                       For concerns after hours:                                                 [phone]     1. Medication changes: Increase your lisinopril back to the full pill (40mg) once daily. No other changes today.  2.  Plan from today:  Plan return to see Dr. Tomlin in ~3 months with fasting labs prior to appointment.

## 2020-06-08 ENCOUNTER — CARE COORDINATION (OUTPATIENT)
Dept: CARDIOLOGY | Facility: CLINIC | Age: 70
End: 2020-06-08

## 2020-06-08 NOTE — PROGRESS NOTES
Pt will hold lisinopril and carvedilol until BP comes back up. Did ask him to call me tomorrow after he takes his BP and we will reassess.   Pt ok with plan.   Princess Chapa RN 4:25 PM 06/08/20

## 2020-06-08 NOTE — PROGRESS NOTES
Justin Tmolin MD Gerdowsky, Christina, RN    Caller: Unspecified (Today,  2:33 PM)               What is Heart rate.   Hold lisinopril and carvedilol until BP normal.

## 2020-06-08 NOTE — PROGRESS NOTES
HR 84. HRs have been in the 80s consistently   Restart both at same dose - lisinopril 40 mg daily and Carvedilol 25 mg BID?  Princess Chapa RN 4:11 PM 06/08/20

## 2020-06-08 NOTE — PROGRESS NOTES
"Wife left  reporting low BPs: 77/57 & 73/57, HR 84    Spoke to pt. He states he was sitting at kitchen table \"when I felt faint all of a sudden\". Pt states his wife said he was very pale. Denies syncope. Bps have been 130-140s/70s.   Pt reports he is eating and drinking well. He was not sitting out side in the heat & humidity but inside with air conditioning on.   Denies doubling up on medications. Pt is currently laying on recliner with his feet elevated. Declines going to ER.  Lisinopril was increased from 20 mg daily to 40 mg on 5/28 by Gill Doty, STORMY.     Taking:   Carvedilol 25 mg BID  Furosemide 40 mg daily  Imdur 60 mg daily  Lisinopril 40 mg daily  Spironolactone 25 mg daily       Will review wit Dr Tomlin as Gill is off today.  Princess Chapa RN 3:20 PM 06/08/20    "

## 2020-06-09 DIAGNOSIS — I25.10 CAD (CORONARY ARTERY DISEASE): Primary | ICD-10-CM

## 2020-06-09 RX ORDER — ISOSORBIDE MONONITRATE 60 MG/1
60 TABLET, EXTENDED RELEASE ORAL DAILY
Qty: 90 TABLET | Refills: 0 | Status: SHIPPED | OUTPATIENT
Start: 2020-06-09 | End: 2020-09-08

## 2020-06-09 NOTE — PROGRESS NOTES
"Called pt. He is still waking up. Pt's states his BP last evening was \"90s. I can't remember. I will let you know after I get up and take my blood pressure\" .   Pt will call back with BP from last evening and this morning.   Princess Chapa RN 9:59 AM 06/09/20        "

## 2020-06-11 NOTE — PROGRESS NOTES
"Called pt as he did not call back.   He is out \"running around\" per wife. She states he feels \"great today\" Asked wife if he was complaining of dizziness and she stated \"he sits so much but he hasn't said anything. He only stopped one the meds.\" She is unsure which one.       BPs & HRs  6/11  /72, HR 72  6/10   BP 94/69, HR  76    She will have  Him call when he returns.   Will update Gill       Future Appointments   Date Time Provider Department Center   6/17/2020 11:30 AM Татьяна Mckeon DPM, Podiatry/Foot and Ankle Surgery [facility]   8/17/2020  2:15 PM Justin Tomlin MD [facility]       Princess Chapa, RN 1:56 PM 06/11/20      "

## 2020-06-12 NOTE — PROGRESS NOTES
"Spoke to pt. Pt states he re-started both Lisinopril 40 mg daily and Coreg 25 mg BID.   He stopped Lisinopril on 6/9 am and restarted 6/11 am.  He stopped Coreg on 6/8 pm and restarted 6/11 am    Pt is currently sitting outside watching grand kids play. He sates dizziness \"didn't seem to change\"   BP today is 130/92, HR 71  States he started both meds as his BP was 148/105 and HR 72. He did not like how high the bottom number was.     Will update Gill   Future Appointments   Date Time Provider Department Center   6/17/2020 11:30 AM Татьяна Mckeon DPM, Podiatry/Foot and Ankle Surgery [facility]   8/17/2020  2:15 PM Justin Tomlin MD [facility]       Princess Chapa, RN 3:22 PM 06/12/20      "

## 2020-06-12 NOTE — PROGRESS NOTES
Gill Doty PA Gerdowsky, Christina, RN    Caller: Unspecified (4 days ago,  2:33 PM)               His blood pressures are all over the board.   Let's just keep him back on his usual regimen, and if it runs low and he's dizzy, he should hold the lisinopril. If no better then hold coreg until it returns to normal. Then resume as usual.   Ask him to report back middle of next week to see if things have stabilized out.

## 2020-06-15 ENCOUNTER — CARE COORDINATION (OUTPATIENT)
Dept: CARDIOLOGY | Facility: CLINIC | Age: 70
End: 2020-06-15

## 2020-06-15 NOTE — PROGRESS NOTES
Called to get update on BP & dizziness overwkend. Unable to leave VM for pt as VM box not set up.   VM left on cell  Princess Chapa RN 12:10 PM 06/15/20

## 2020-06-16 DIAGNOSIS — I10 ESSENTIAL HYPERTENSION: Primary | ICD-10-CM

## 2020-06-16 DIAGNOSIS — I63.432 CEREBROVASCULAR ACCIDENT (CVA) DUE TO EMBOLISM OF LEFT POSTERIOR CEREBRAL ARTERY (H): ICD-10-CM

## 2020-06-16 RX ORDER — ATORVASTATIN CALCIUM 40 MG/1
40 TABLET, FILM COATED ORAL EVERY EVENING
Qty: 90 TABLET | Refills: 3 | Status: SHIPPED | OUTPATIENT
Start: 2020-06-16 | End: 2021-07-19

## 2020-06-16 RX ORDER — LISINOPRIL 40 MG/1
40 TABLET ORAL DAILY
Qty: 90 TABLET | Refills: 3 | Status: SHIPPED | OUTPATIENT
Start: 2020-06-16 | End: 2020-07-16

## 2020-06-17 ENCOUNTER — OFFICE VISIT (OUTPATIENT)
Dept: PODIATRY | Facility: CLINIC | Age: 70
End: 2020-06-17
Payer: COMMERCIAL

## 2020-06-17 VITALS
DIASTOLIC BLOOD PRESSURE: 70 MMHG | HEIGHT: 76 IN | WEIGHT: 250 LBS | SYSTOLIC BLOOD PRESSURE: 114 MMHG | BODY MASS INDEX: 30.44 KG/M2

## 2020-06-17 DIAGNOSIS — Z89.421 H/O AMPUTATION OF LESSER TOE, RIGHT (H): ICD-10-CM

## 2020-06-17 DIAGNOSIS — M20.41 HAMMER TOES OF BOTH FEET: ICD-10-CM

## 2020-06-17 DIAGNOSIS — E11.42 DIABETIC POLYNEUROPATHY ASSOCIATED WITH TYPE 2 DIABETES MELLITUS (H): Primary | ICD-10-CM

## 2020-06-17 DIAGNOSIS — M20.42 HAMMER TOES OF BOTH FEET: ICD-10-CM

## 2020-06-17 DIAGNOSIS — L84 PRE-ULCERATIVE CALLUSES: ICD-10-CM

## 2020-06-17 PROCEDURE — 99212 OFFICE O/P EST SF 10 MIN: CPT | Mod: 24 | Performed by: PODIATRIST

## 2020-06-17 ASSESSMENT — MIFFLIN-ST. JEOR: SCORE: 2000.49

## 2020-06-17 NOTE — LETTER
"    6/17/2020         RE: Jayro Elder  [address] [place] [address]        Dear Colleague,    Thank you for referring your patient, Jayro Elder, to the [facility]. Please see a copy of my visit note below.    Podiatry / Foot and Ankle Surgery Progress Note    June 17, 2020    Subject: Patient was seen for follow up on ulcerations of the left foot.  Notes that he has not had any drainage for a few weeks.  He is been doing an emery board to the callused areas every other day.  He has been wearing his offloading shoes.  No concerns today.    Objective:  Vitals: /70   Ht 1.93 m (6' 4\")   Wt 113.4 kg (250 lb)   BMI 30.43 kg/m      A1C: 10.4 (4/2020)    General:  Patient is alert and orientated.  NAD  Vascular:  DP and PT pulses are palpable.  No varicosities noted  CFT's < 3secs.  Skin temp is normal     Neuro:  Light and gross touch sensation absent to feet.      Derm:  per ulcerative hyperkeratotic lesions to plantar left 1st metatarsal heads and distal left 2nd and 3rd toes with no open areas.  No purulent drainage or redness or acute signs of infection. .        Musculoskeletal: multiple previous toe amputations right foot.       Assessment:     Diabetic polyneuropathy associated with type 2 diabetes mellitus (H)  Pre-ulcerative calluses  Hammer toes of both feet  H/O amputation of lesser toe, right (H)     Plan: At this time there is no debridement of the pre-ulcerative calluses needed as they were quite thin.  He has been using an emery board and wearing his offloading inserts all the time.  We will have him continue to do this.  We will have him follow-up in 2 months for reassessment to keep a close eye on the feet as these could lead to re-ulcerations.  He was told to call with further questions or concerns.       Татьяна Mckeon DPM, Podiatry/Foot and Ankle Surgery    Weight management plan: Patient was referred to their PCP to discuss a diet and exercise " plan.      Again, thank you for allowing me to participate in the care of your patient.        Sincerely,        Татьяна Mckeon DPM, Podiatry/Foot and Ankle Surgery

## 2020-06-17 NOTE — PROGRESS NOTES
"Podiatry / Foot and Ankle Surgery Progress Note    June 17, 2020    Subject: Patient was seen for follow up on ulcerations of the left foot.  Notes that he has not had any drainage for a few weeks.  He is been doing an emery board to the callused areas every other day.  He has been wearing his offloading shoes.  No concerns today.    Objective:  Vitals: /70   Ht 1.93 m (6' 4\")   Wt 113.4 kg (250 lb)   BMI 30.43 kg/m      A1C: 10.4 (4/2020)    General:  Patient is alert and orientated.  NAD  Vascular:  DP and PT pulses are palpable.  No varicosities noted  CFT's < 3secs.  Skin temp is normal     Neuro:  Light and gross touch sensation absent to feet.      Derm:  per ulcerative hyperkeratotic lesions to plantar left 1st metatarsal heads and distal left 2nd and 3rd toes with no open areas.  No purulent drainage or redness or acute signs of infection. .        Musculoskeletal: multiple previous toe amputations right foot.       Assessment:     Diabetic polyneuropathy associated with type 2 diabetes mellitus (H)  Pre-ulcerative calluses  Hammer toes of both feet  H/O amputation of lesser toe, right (H)     Plan: At this time there is no debridement of the pre-ulcerative calluses needed as they were quite thin.  He has been using an emery board and wearing his offloading inserts all the time.  We will have him continue to do this.  We will have him follow-up in 2 months for reassessment to keep a close eye on the feet as these could lead to re-ulcerations.  He was told to call with further questions or concerns.       Татьяна Mckeon DPM, Podiatry/Foot and Ankle Surgery    Weight management plan: Patient was referred to their PCP to discuss a diet and exercise plan.    "

## 2020-06-17 NOTE — PROGRESS NOTES
No answer when call to pt.   Reviewed Epic. He did have appt with podiatry today. BP was 114/70 & wt 250 lbs at clinic. Pt referred to PCP to discuss diet and exercise plan.     Future Appointments   Date Time Provider Department Center   8/17/2020  2:15 PM Justin Tomlin MD [facility]       Princess Chapa RN 12:46 PM 06/17/20

## 2020-06-18 DIAGNOSIS — E11.21 TYPE 2 DIABETES MELLITUS WITH DIABETIC NEPHROPATHY, WITH LONG-TERM CURRENT USE OF INSULIN (H): ICD-10-CM

## 2020-06-18 DIAGNOSIS — Z79.4 TYPE 2 DIABETES MELLITUS WITH DIABETIC NEPHROPATHY, WITH LONG-TERM CURRENT USE OF INSULIN (H): ICD-10-CM

## 2020-06-18 DIAGNOSIS — G25.81 RESTLESS LEGS SYNDROME (RLS): ICD-10-CM

## 2020-06-18 NOTE — TELEPHONE ENCOUNTER
Gabapentin 100 mg    Last Written Prescription Date:  5/27/2020  Last Fill Quantity: 60,  # refills: 0   Last office visit: 4/17/2020 with prescribing provider:      Future Office Visit:      Last fills per [place]      5/27/2020 qty 60, 3/3/2020 [id number], 1/7/2020 qty 90 per DM or RM provider.    Melissa Hewitt RN

## 2020-06-19 RX ORDER — GABAPENTIN 100 MG/1
300 CAPSULE ORAL AT BEDTIME
Qty: 90 CAPSULE | Refills: 2 | Status: SHIPPED | OUTPATIENT
Start: 2020-06-19 | End: 2020-06-24

## 2020-06-19 NOTE — TELEPHONE ENCOUNTER
Called patient and he states takes 200-400 mg depending on how bad his feet feel. States that most often he takes 300 mg. Refilled for 300 mg nightly for 90 days per verbal order below.    Erika WALLER RN, BSN

## 2020-06-23 DIAGNOSIS — E11.21 TYPE 2 DIABETES MELLITUS WITH DIABETIC NEPHROPATHY, WITH LONG-TERM CURRENT USE OF INSULIN (H): ICD-10-CM

## 2020-06-23 DIAGNOSIS — G25.81 RESTLESS LEGS SYNDROME (RLS): ICD-10-CM

## 2020-06-23 DIAGNOSIS — Z79.4 TYPE 2 DIABETES MELLITUS WITH DIABETIC NEPHROPATHY, WITH LONG-TERM CURRENT USE OF INSULIN (H): ICD-10-CM

## 2020-06-24 RX ORDER — GABAPENTIN 100 MG/1
300 CAPSULE ORAL AT BEDTIME
Qty: 270 CAPSULE | Refills: 2 | Status: SHIPPED | OUTPATIENT
Start: 2020-06-24 | End: 2020-09-21

## 2020-06-24 NOTE — TELEPHONE ENCOUNTER
Prescription approved per [facility].    Layla King RN Flex

## 2020-07-16 ENCOUNTER — TELEPHONE (OUTPATIENT)
Dept: PODIATRY | Facility: CLINIC | Age: 70
End: 2020-07-16

## 2020-07-16 ENCOUNTER — APPOINTMENT (OUTPATIENT)
Dept: GENERAL RADIOLOGY | Facility: CLINIC | Age: 70
End: 2020-07-16
Attending: EMERGENCY MEDICINE
Payer: COMMERCIAL

## 2020-07-16 ENCOUNTER — HOSPITAL ENCOUNTER (OUTPATIENT)
Facility: CLINIC | Age: 70
Setting detail: OBSERVATION
LOS: 1 days | Discharge: HOME | End: 2020-07-17
Attending: EMERGENCY MEDICINE | Admitting: PODIATRIST
Payer: COMMERCIAL

## 2020-07-16 DIAGNOSIS — L97.528 DIABETIC ULCER OF TOE OF LEFT FOOT ASSOCIATED WITH TYPE 2 DIABETES MELLITUS, WITH OTHER ULCER SEVERITY (H): ICD-10-CM

## 2020-07-16 DIAGNOSIS — E11.621 DIABETIC ULCER OF TOE OF LEFT FOOT ASSOCIATED WITH TYPE 2 DIABETES MELLITUS, WITH OTHER ULCER SEVERITY (H): ICD-10-CM

## 2020-07-16 DIAGNOSIS — E11.628 DIABETIC INFECTION OF LEFT FOOT (H): Primary | ICD-10-CM

## 2020-07-16 DIAGNOSIS — R73.9 HYPERGLYCEMIA: ICD-10-CM

## 2020-07-16 DIAGNOSIS — L08.9 DIABETIC INFECTION OF LEFT FOOT (H): Primary | ICD-10-CM

## 2020-07-16 LAB
ANION GAP SERPL CALCULATED.3IONS-SCNC: 7 MMOL/L (ref 3–14)
BASOPHILS # BLD AUTO: 0 10E9/L (ref 0–0.2)
BASOPHILS NFR BLD AUTO: 0.5 %
BUN SERPL-MCNC: 26 MG/DL (ref 7–30)
CALCIUM SERPL-MCNC: 8.8 MG/DL (ref 8.5–10.1)
CHLORIDE SERPL-SCNC: 99 MMOL/L (ref 94–109)
CO2 SERPL-SCNC: 27 MMOL/L (ref 20–32)
CREAT SERPL-MCNC: 1.22 MG/DL (ref 0.66–1.25)
CRP SERPL-MCNC: 15.2 MG/L (ref 0–8)
DIFFERENTIAL METHOD BLD: ABNORMAL
EOSINOPHIL # BLD AUTO: 0.2 10E9/L (ref 0–0.7)
EOSINOPHIL NFR BLD AUTO: 3.6 %
ERYTHROCYTE [DISTWIDTH] IN BLOOD BY AUTOMATED COUNT: 12.8 % (ref 10–15)
ERYTHROCYTE [SEDIMENTATION RATE] IN BLOOD BY WESTERGREN METHOD: 18 MM/H (ref 0–20)
GFR SERPL CREATININE-BSD FRML MDRD: 60 ML/MIN/{1.73_M2}
GLUCOSE BLDC GLUCOMTR-MCNC: 265 MG/DL (ref 70–99)
GLUCOSE BLDC GLUCOMTR-MCNC: 322 MG/DL (ref 70–99)
GLUCOSE SERPL-MCNC: 421 MG/DL (ref 70–99)
HCT VFR BLD AUTO: 37.7 % (ref 40–53)
HGB BLD-MCNC: 12.3 G/DL (ref 13.3–17.7)
IMM GRANULOCYTES # BLD: 0 10E9/L (ref 0–0.4)
IMM GRANULOCYTES NFR BLD: 0.4 %
LACTATE BLD-SCNC: 1.6 MMOL/L (ref 0.7–2)
LYMPHOCYTES # BLD AUTO: 1.1 10E9/L (ref 0.8–5.3)
LYMPHOCYTES NFR BLD AUTO: 19.2 %
MCH RBC QN AUTO: 28.7 PG (ref 26.5–33)
MCHC RBC AUTO-ENTMCNC: 32.6 G/DL (ref 31.5–36.5)
MCV RBC AUTO: 88 FL (ref 78–100)
MONOCYTES # BLD AUTO: 0.6 10E9/L (ref 0–1.3)
MONOCYTES NFR BLD AUTO: 11.5 %
NEUTROPHILS # BLD AUTO: 3.6 10E9/L (ref 1.6–8.3)
NEUTROPHILS NFR BLD AUTO: 64.8 %
NRBC # BLD AUTO: 0 10*3/UL
NRBC BLD AUTO-RTO: 0 /100
PLATELET # BLD AUTO: 224 10E9/L (ref 150–450)
POTASSIUM SERPL-SCNC: 4.2 MMOL/L (ref 3.4–5.3)
RBC # BLD AUTO: 4.28 10E12/L (ref 4.4–5.9)
SODIUM SERPL-SCNC: 133 MMOL/L (ref 133–144)
WBC # BLD AUTO: 5.6 10E9/L (ref 4–11)

## 2020-07-16 PROCEDURE — 73630 X-RAY EXAM OF FOOT: CPT | Mod: LT

## 2020-07-16 PROCEDURE — 96365 THER/PROPH/DIAG IV INF INIT: CPT

## 2020-07-16 PROCEDURE — 25800030 ZZH RX IP 258 OP 636: Performed by: EMERGENCY MEDICINE

## 2020-07-16 PROCEDURE — C9803 HOPD COVID-19 SPEC COLLECT: HCPCS

## 2020-07-16 PROCEDURE — L3260 AMBULATORY SURGICAL BOOT EAC: HCPCS

## 2020-07-16 PROCEDURE — 96361 HYDRATE IV INFUSION ADD-ON: CPT

## 2020-07-16 PROCEDURE — 12000000 ZZH R&B MED SURG/OB

## 2020-07-16 PROCEDURE — 83605 ASSAY OF LACTIC ACID: CPT | Performed by: EMERGENCY MEDICINE

## 2020-07-16 PROCEDURE — 96372 THER/PROPH/DIAG INJ SC/IM: CPT

## 2020-07-16 PROCEDURE — 36415 COLL VENOUS BLD VENIPUNCTURE: CPT

## 2020-07-16 PROCEDURE — 25000128 H RX IP 250 OP 636: Performed by: EMERGENCY MEDICINE

## 2020-07-16 PROCEDURE — 25800030 ZZH RX IP 258 OP 636: Performed by: HOSPITALIST

## 2020-07-16 PROCEDURE — 99223 1ST HOSP IP/OBS HIGH 75: CPT | Mod: AI | Performed by: HOSPITALIST

## 2020-07-16 PROCEDURE — 25000132 ZZH RX MED GY IP 250 OP 250 PS 637: Performed by: HOSPITALIST

## 2020-07-16 PROCEDURE — 99285 EMERGENCY DEPT VISIT HI MDM: CPT | Mod: 25

## 2020-07-16 PROCEDURE — 87040 BLOOD CULTURE FOR BACTERIA: CPT | Mod: XS | Performed by: EMERGENCY MEDICINE

## 2020-07-16 PROCEDURE — 00000146 ZZHCL STATISTIC GLUCOSE BY METER IP

## 2020-07-16 PROCEDURE — 85652 RBC SED RATE AUTOMATED: CPT | Performed by: EMERGENCY MEDICINE

## 2020-07-16 PROCEDURE — 86140 C-REACTIVE PROTEIN: CPT | Performed by: EMERGENCY MEDICINE

## 2020-07-16 PROCEDURE — 85025 COMPLETE CBC W/AUTO DIFF WBC: CPT | Performed by: EMERGENCY MEDICINE

## 2020-07-16 PROCEDURE — 80048 BASIC METABOLIC PNL TOTAL CA: CPT | Performed by: EMERGENCY MEDICINE

## 2020-07-16 PROCEDURE — U0003 INFECTIOUS AGENT DETECTION BY NUCLEIC ACID (DNA OR RNA); SEVERE ACUTE RESPIRATORY SYNDROME CORONAVIRUS 2 (SARS-COV-2) (CORONAVIRUS DISEASE [COVID-19]), AMPLIFIED PROBE TECHNIQUE, MAKING USE OF HIGH THROUGHPUT TECHNOLOGIES AS DESCRIBED BY CMS-2020-01-R: HCPCS | Performed by: EMERGENCY MEDICINE

## 2020-07-16 RX ORDER — CARVEDILOL 25 MG/1
25 TABLET ORAL DAILY
Status: DISCONTINUED | OUTPATIENT
Start: 2020-07-17 | End: 2020-07-17 | Stop reason: HOSPADM

## 2020-07-16 RX ORDER — SPIRONOLACTONE 25 MG/1
25 TABLET ORAL DAILY
Status: DISCONTINUED | OUTPATIENT
Start: 2020-07-17 | End: 2020-07-17 | Stop reason: HOSPADM

## 2020-07-16 RX ORDER — NITROGLYCERIN 0.4 MG/1
0.4 TABLET SUBLINGUAL EVERY 5 MIN PRN
Status: DISCONTINUED | OUTPATIENT
Start: 2020-07-16 | End: 2020-07-17 | Stop reason: HOSPADM

## 2020-07-16 RX ORDER — HYDROCODONE BITARTRATE AND ACETAMINOPHEN 5; 325 MG/1; MG/1
1-2 TABLET ORAL EVERY 4 HOURS PRN
Status: DISCONTINUED | OUTPATIENT
Start: 2020-07-16 | End: 2020-07-17 | Stop reason: HOSPADM

## 2020-07-16 RX ORDER — NALOXONE HYDROCHLORIDE 0.4 MG/ML
.1-.4 INJECTION, SOLUTION INTRAMUSCULAR; INTRAVENOUS; SUBCUTANEOUS
Status: DISCONTINUED | OUTPATIENT
Start: 2020-07-16 | End: 2020-07-17 | Stop reason: HOSPADM

## 2020-07-16 RX ORDER — SODIUM CHLORIDE 9 MG/ML
INJECTION, SOLUTION INTRAVENOUS CONTINUOUS
Status: ACTIVE | OUTPATIENT
Start: 2020-07-16 | End: 2020-07-17

## 2020-07-16 RX ORDER — ATORVASTATIN CALCIUM 40 MG/1
40 TABLET, FILM COATED ORAL EVERY EVENING
Status: DISCONTINUED | OUTPATIENT
Start: 2020-07-16 | End: 2020-07-17 | Stop reason: HOSPADM

## 2020-07-16 RX ORDER — CLOPIDOGREL BISULFATE 75 MG/1
75 TABLET ORAL DAILY
Status: DISCONTINUED | OUTPATIENT
Start: 2020-07-17 | End: 2020-07-17 | Stop reason: HOSPADM

## 2020-07-16 RX ORDER — LIDOCAINE 40 MG/G
CREAM TOPICAL
Status: DISCONTINUED | OUTPATIENT
Start: 2020-07-16 | End: 2020-07-17 | Stop reason: HOSPADM

## 2020-07-16 RX ORDER — NICOTINE POLACRILEX 4 MG
15-30 LOZENGE BUCCAL
Status: DISCONTINUED | OUTPATIENT
Start: 2020-07-16 | End: 2020-07-17 | Stop reason: HOSPADM

## 2020-07-16 RX ORDER — FUROSEMIDE 40 MG
40 TABLET ORAL DAILY
Status: DISCONTINUED | OUTPATIENT
Start: 2020-07-17 | End: 2020-07-17 | Stop reason: HOSPADM

## 2020-07-16 RX ORDER — GABAPENTIN 300 MG/1
300 CAPSULE ORAL AT BEDTIME
Status: DISCONTINUED | OUTPATIENT
Start: 2020-07-16 | End: 2020-07-17 | Stop reason: HOSPADM

## 2020-07-16 RX ORDER — DEXTROSE MONOHYDRATE 25 G/50ML
25-50 INJECTION, SOLUTION INTRAVENOUS
Status: DISCONTINUED | OUTPATIENT
Start: 2020-07-16 | End: 2020-07-17 | Stop reason: HOSPADM

## 2020-07-16 RX ORDER — PANTOPRAZOLE SODIUM 40 MG/1
40 TABLET, DELAYED RELEASE ORAL
Status: DISCONTINUED | OUTPATIENT
Start: 2020-07-17 | End: 2020-07-17 | Stop reason: HOSPADM

## 2020-07-16 RX ORDER — ONDANSETRON 4 MG/1
4 TABLET, ORALLY DISINTEGRATING ORAL EVERY 6 HOURS PRN
Status: DISCONTINUED | OUTPATIENT
Start: 2020-07-16 | End: 2020-07-17 | Stop reason: HOSPADM

## 2020-07-16 RX ORDER — ISOSORBIDE MONONITRATE 60 MG/1
60 TABLET, EXTENDED RELEASE ORAL DAILY
Status: DISCONTINUED | OUTPATIENT
Start: 2020-07-17 | End: 2020-07-17 | Stop reason: HOSPADM

## 2020-07-16 RX ORDER — ONDANSETRON 2 MG/ML
4 INJECTION INTRAMUSCULAR; INTRAVENOUS EVERY 6 HOURS PRN
Status: DISCONTINUED | OUTPATIENT
Start: 2020-07-16 | End: 2020-07-17 | Stop reason: HOSPADM

## 2020-07-16 RX ADMIN — SODIUM CHLORIDE 500 ML: 9 INJECTION, SOLUTION INTRAVENOUS at 19:08

## 2020-07-16 RX ADMIN — SODIUM CHLORIDE: 9 INJECTION, SOLUTION INTRAVENOUS at 21:25

## 2020-07-16 RX ADMIN — TAZOBACTAM SODIUM AND PIPERACILLIN SODIUM 4.5 G: 500; 4 INJECTION, SOLUTION INTRAVENOUS at 19:17

## 2020-07-16 RX ADMIN — GABAPENTIN 300 MG: 300 CAPSULE ORAL at 21:17

## 2020-07-16 RX ADMIN — LEVOTHYROXINE SODIUM 137 MCG: 25 TABLET ORAL at 21:17

## 2020-07-16 RX ADMIN — APIXABAN 5 MG: 5 TABLET, FILM COATED ORAL at 21:17

## 2020-07-16 RX ADMIN — ATORVASTATIN CALCIUM 40 MG: 40 TABLET, FILM COATED ORAL at 21:18

## 2020-07-16 RX ADMIN — HYDROCODONE BITARTRATE AND ACETAMINOPHEN 2 TABLET: 5; 325 TABLET ORAL at 21:20

## 2020-07-16 ASSESSMENT — ENCOUNTER SYMPTOMS
ROS SKIN COMMENTS: BLOODY DRAINAGE
FEVER: 0
NAUSEA: 0
VOMITING: 0
COLOR CHANGE: 1
WOUND: 1
CHILLS: 1

## 2020-07-16 ASSESSMENT — ACTIVITIES OF DAILY LIVING (ADL)
FALL_HISTORY_WITHIN_LAST_SIX_MONTHS: NO
TOILETING: 1-->ASSISTIVE EQUIPMENT
SWALLOWING: 0-->SWALLOWS FOODS/LIQUIDS WITHOUT DIFFICULTY
RETIRED_EATING: 0-->INDEPENDENT
COGNITION: 0 - NO COGNITION ISSUES REPORTED
DRESS: 0-->INDEPENDENT
TRANSFERRING: 1-->ASSISTIVE EQUIPMENT
AMBULATION: 1-->ASSISTIVE EQUIPMENT
PRIOR_FUNCTIONAL_LEVEL_COMMENT: IND WITH CANE
RETIRED_COMMUNICATION: 0-->UNDERSTANDS/COMMUNICATES WITHOUT DIFFICULTY
BATHING: 0-->INDEPENDENT

## 2020-07-16 ASSESSMENT — MIFFLIN-ST. JEOR
SCORE: 1982.79
SCORE: 1987.33

## 2020-07-16 NOTE — ED TRIAGE NOTES
ABCs intact. Pt hx L foot wound. Pt states its been getting worse the past 3-4 days. Pt c/o chill x 2 days. Pt denies CP, SOB, fever, sore throat or rash.

## 2020-07-16 NOTE — ED PROVIDER NOTES
"History     Chief Complaint:  Foot Wound       HPI  Jayro Elder is a 70 year old male with a complex medical history Including MI, Type 2 diabetes, hypertension, CVA, atrial fibrillation on Eliquis, among others, who presents for evaluation of left foot wound. The patient states that he has had a wound on his left foot for the past 3 years, but over the last 3 to 4 days it has started to have bloody discharge, \"foul\" smell, and pain/redness throughout the area. He also notes to be experiencing chills.  He denies taking any pain medications for his symptoms.  He also denies any nausea, vomiting, fever. Of note he states he has a an appointment with his podiatrist on  07/30, but he does not want to wait until this time.    Allergies:  No Known Drug Allergies      Medications:   Eliquis  Lipitor  Coreg  Plavix  Lasix  Gabapentin  Insulin aspart  Insulin glargine  Synthroid  Zestril  Nitrostat  Imdur  Protonix  Aldactone    Medical History:   CAD  Myocardial infarction  Cancer  Cardiomyopathy  Cellulitis  Cerebral infarct  Hypertension  Generalized osteoarthritis  Microalbuminuria  Neuropathy  Sepsis  Sleep apnea  tobacco use disorder  Type 2 diabetes  Substance abuse  Thyroid disease  Atrial fibrillation  Unstable angina  Osteomyelitis  CVA  Amputation of lesser toe  Goiter    Surgical History   Left toe amputation, 5X  Angiogram, 4X  Back surgery, 3X,  Laminectomy  Bunionectomy  Gingival surgery  Coronary angiogram  Left heart catheter radiation  PCI stent  Incision and drainage total  Irrigation and debridement of foot  Left shoulder surgery  Vascular surgery, 7 stens  Family History:   Sister: Cancer  Brother: Thyroid disease, cardiovascular, diabetes  Father:  Cancer  Mother: Arthritis , thyroid disease, diabetes    Social History:  Patient was not accompanied to the ED.   Smoking Status: Former Smoker    Type: Cigarettes    Quit 2005  Smokeless Tobacco: Never Used   Alcohol Use: Positive    Drug Use: Negative " "  Primary Care: Davion Cordova     Review of Systems   Constitutional: Positive for chills. Negative for fever.   Gastrointestinal: Negative for nausea and vomiting.   Musculoskeletal:        Foot pain   Skin: Positive for color change and wound.        Bloody drainage   All other systems reviewed and are negative.      Physical Exam     Patient Vitals for the past 24 hrs:   BP Temp Temp src Pulse Resp SpO2 Height Weight   07/16/20 1654 130/80 98.1  F (36.7  C) Temporal 91 18 98 % 1.93 m (6' 4\") 112.1 kg (247 lb 3.2 oz)          Physical Exam  Nursing note and vitals reviewed.  Constitutional: Well nourished.   Eyes: Conjunctiva normal.  Pupils are equal, round, and reactive to light.   ENT: Nose normal. Mucous membranes pink and moist.    Neck: Normal range of motion.  CVS: Normal rate, regular rhythm.  Normal heart sounds. 2/2 DP pulses  Pulmonary: Lungs clear to auscultation bilaterally. No wheezes/rales/rhonchi.  GI: Abdomen soft. Nontender, nondistended. No rigidity or guarding.    MSK: No calf tenderness or swelling.  Neuro: Alert. Follows simple commands. Sensation intact x 4.   Skin: Skin is warm and dry. Base of L. Great toe plantar surface with warmth, erythema +foul odor; no significant purulent drainage. No crepitance.  Mild erythema and lymphatic streaking up distal L. leg  Psychiatric: Normal affect.       Emergency Department Course     Imaging:  Radiology results were communicated with the patient who voiced understanding of the findings.    Foot XR, G/E 3 views, left  Postoperative changes of amputation of the distal aspect of the second toe. There is some cortical irregularity to the medial aspect of the first metatarsal head and surrounding soft tissue swelling. However, the bone appearance is unchanged   from the prior examination. The soft tissue swelling has increased. The stability of the bone findings is not suggestive of osteomyelitis. There is degenerative change across the midfoot. " Plantar calcaneal spurring.    Reading per radiology     Laboratory:  Laboratory findings were communicated with the patient who voiced understanding of the findings.    CRP: 15.2 (H)   Lactic: 1.6  ESR: 18    CBC: WBC 5.6, HGB 12.3 (L) ,   BMP: Glucose 421 (H) GFR 60 (L) (Creatinine 1.22) o/w WNL   Asymptomatic COVID19: pending    Interventions:    Zosyn 3.375 g IV   Insulin aspart 10 units subcutaneous    mL IV     Emergency Department Course:    1705 Nursing notes and vitals reviewed.    1710 I performed an exam of the patient as documented above.     1744 IV was inserted and blood was drawn for laboratory testing, results above.    1830 The patient was sent for XR while in the emergency department, results above.      The patient's nose was swabbed and this sample was sent for COVID testing, findings above.      1827 I consulted with Dr. Bartlett of the hospitalist services. They are in agreement to accept the patient for admission. Findings and plan explained to the Patient who consents to admission. Discussed the patient with Dr. Bartlett, who will admit the patient to a medical surge bed for further monitoring, evaluation, and treatment.    Impression & Plan     Covid-19  Jayro Elder was evaluated during a global COVID-19 pandemic, which necessitated consideration that the patient might be at risk for infection with the SARS-CoV-2 virus that causes COVID-19.   Applicable protocols for evaluation were followed during the patient's care.   COVID-19 was considered as part of the patient's evaluation. The plan for testing is:  a test was obtained during this visit.      Medical Decision Making:  Patient is a 70-year-old diabetic presenting with concerns for infected diabetic foot ulcer.  He is neurovascularly intact, no evidence of sepsis, but given diabetic with concerns for lymphatic streaking on exam I do feel he would benefit from IV antibiotics at this time.  No evidence of soft tissue gas on  xray.  He was accepted by hospitalist for admission.         Diagnosis:     ICD-10-CM    1. Diabetic ulcer of toe of left foot associated with type 2 diabetes mellitus, with other ulcer severity (H)  E11.621 Erythrocyte sedimentation rate auto    L97.528    2. Hyperglycemia  R73.9         Disposition:  Admitted to Dr. Bartlett.    Scribe Disclosure:  I, Andrea Massey, am serving as a scribe at 5:05 PM on 7/16/2020 to document services personally performed by Cheryle Laboy DO based on my observations and the provider's statements to me.            Cheryle Laboy DO  07/16/20 1917

## 2020-07-16 NOTE — LETTER
"Transition Communication Hand-off for Care Transitions to Next Level of Care Provider    Name: Jayro Elder  : 1950  MRN #: [id number]  Primary Care Provider: Davion Cordoav  Primary Care MD Name: Kevon Cordova  Primary Clinic: [address] [place] [address]  Primary Care Clinic Name: [facility]  Reason for Hospitalization:  Hyperglycemia [R73.9]  Diabetic ulcer of toe of left foot associated with type 2 diabetes mellitus, with other ulcer severity (H) [E11.621, L97.528]  Admit Date/Time: 2020  4:56 PM  Discharge Date: 20  Payor Source: Payor: [employer] / Plan: [employer] MEDICARE ADVANTAGE / Product Type: Medicare /     Readmission Assessment Measure ([place]) Risk Score/category: average           Reason for Communication Hand-off Referral: Other A1C 10.4    Discharge Plan: home with podiatry follow up       Concern for non-adherence with plan of care:   No, but prefers in person appointments  Discharge Needs Assessment: diabetic management      Already enrolled in Tele-monitoring program and name of program:  none  Follow-up specialty is recommended: Yes    Follow-up plan:    Future Appointments   Date Time Provider Department Center   2020 10:30 AM Татьяна Mckeon DPM, Podiatry/Foot and Ankle Surgery [facility]   2020  2:15 PM Justin Tomlin MD [facility]       Any outstanding tests or procedures:              Key Recommendations:  Pt admitted with diabetic foot infection. Seen by podiatry already this morning who recommends one more dose of IV abx and discharge home on oral abx. Pt states he needs a new orthotic shoe as his is falling apart. Note that orthotics has been consulted for this. Pts A1C in April was 10.4. pt states he checks his blood sugars 2-3 times a day, follows a diabetic diet and does daily foot checks.CM mentioned his follow up appointments coming up, one of which is cardiology televisit. Pt agitated by the \"televisit\" and stated he " doesn't believe in those and was planning to not participate unless it's in person. CM noted pt declined a diabetic education telephone appointment in May. Pt would likely benefit more from in person visits.      Vickie Escobar RN    AVS/Discharge Summary is the source of truth; this is a helpful guide for improved communication of patient story

## 2020-07-16 NOTE — H&P
[facility]    History and Physical  Hospitalist     Date of Admission:  7/16/2020  Date of Service (when I saw the patient): 07/16/20  Provider: Lane Bartlett MD      Chief Complaint   Left foot wound     History is obtained from the patient, electronic health record and emergency department physician    History of Present Illness   Jayro Elder is a 70 year old male who has a very complicated past medical history as can be seen in the list below.  He presents with worsening of pain in the left foot, foul-smelling, bloody secretion coming to a wound under the first toe metatarsophalangeal joint.  Patient has longstanding history of diabetes, has had some amputation of phalanges and diabetic peripheral neuropathy of his feet.  This wound has been evolving for a long time now but the secretion and the foul smelling is new to him.  He has also some streaky redness in the dorsum of the foot.  He denies fever but has been feeling feverish and with chills.  I have discussed his case with Dr. Laboy emergency department physician who is requesting admission.  He already received a dose of Zosyn IV in the emergency department.  Laboratory work-up is significant for:  CBC with normal WBC, hemoglobin 12.3 and rest of the values within the normal limits.  Chemistry: Normal except for GFR 60, lactic acid 1.6, CRP 15.2, glycemia on arrival 421.  X-ray of the left foot 3 views.                                                                   IMPRESSION: Postoperative changes of amputation of the distal aspect of the second toe. There is some cortical irregularity to the medial aspect of the first metatarsal head and surrounding soft tissue swelling. However, the bone appearance is unchanged   from the prior examination. The soft tissue swelling has increased. The stability of the bone findings is not suggestive of osteomyelitis. There is degenerative change across the midfoot. Plantar calcaneal  harris.      Past Medical History    I have reviewed this patient's medical history and updated it with pertinent information if needed.   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     Cardiomyopathy (H)      Cellulitis of left lower extremity 3/13/2018     Cerebral infarction (H)     eye sight decreased in peripheral of right side and blurry     Diabetic ulcer of left midfoot associated with type 2 diabetes mellitus, with fat layer exposed (H) 3/13/2018     Essential hypertension, benign 11/13/2002     Generalized osteoarthrosis, unspecified site 11/13/2002     Microalbuminuria 3/13/2018    X1     Myocardial infarction (H)      Neuropathy      Sepsis (H)      Sleep apnea     doesn't use it     Substance abuse (H)      Syncope, unspecified syncope type 3/13/2018     Thyroid disease     takes medicine     Tobacco use disorder 11/13/2002     Type 2 diabetes mellitus with diabetic peripheral angiopathy without gangrene, unspecified long term insulin use status 2005       Assessment & Plan   Jayro Elder is a 70 year old male who presents with left foot wound with worsening of pain and bloody-foul  Secretion.  Hyperglycemia, feeling feverish, chills.  History of diabetes mellitus on insulin, last A1c 10.7.    1.  Left diabetic foot with wound infection.  2.  Hyperglycemia.  3.  Diabetes mellitus on insulin.  4.  Peripheral neuropathy of diabetes.  5.  PAD.  6.  Essential hypertension.  7.  Coronary artery disease.  8.  Obstructive sleep apnea not on CPAP.    Plan.  Inpatient.  Moderate CHO diet.  Hydration with normal saline until the morning.  Zosyn 3.375 g IV every 6 hours.  Norco 1 or 2 tablets every 6 hours as needed  Continue all his home medications as prior to admission.  Apixaban, atorvastatin, carvedilol, Plavix, furosemide, gabapentin, Imdur, Synthroid,  pantoprazole.  Lantus 42 units a.m.  Prandial insulin 2 unit / 15 g of carbohydrate.  High sliding scale for correction.  D.P.M. consult requested.    Code Status   Full Code    Primary Care Physician   Davion Cordova      Past Surgical History   I have reviewed this patient's surgical history and updated it with pertinent information if needed.  Past Surgical History:   Procedure Laterality Date     AMPUTATE TOE(S) Right 1/6/2019    Procedure: Right open second toe partial amputation;  Surgeon: Sabino Garcia DPM;  Location: [facility] OR     AMPUTATE TOE(S) Right 1/8/2019    Procedure: 1.  Revision right second toe amputation with resection of distal half of proximal phalanx with full closure.    2.  Debridement of callus/preulcerative lesion, distal left second toe and plantar left first metatarsophalangeal joint.;  Surgeon: Ryan Bhagat DPM;  Location: [facility] OR     AMPUTATE TOE(S) Right 3/12/2019    Procedure: Partial right fourth toe amputation.;  Surgeon: Ryan Bhagat DPM;  Location: [facility] OR     AMPUTATE TOE(S) Right 5/6/2019    Procedure: Right partial third toe amputation;  Surgeon: Татьяна Mckeon DPM, Podiatry/Foot and Ankle Surgery;  Location: [facility] OR     AMPUTATE TOE(S) Left 4/18/2020    Procedure: LEFT SECOND TOE AMPUTATION;  Surgeon: Ryan Bhagat DPM;  Location: [facility] OR     ANGIOGRAM  6/29/05    subtotal occ.mid LAD,SUSAN mid LAD     ANGIOGRAM  7/05    mild CAD,patent stent,no flow-limiting lesions,sev.LV dysf.LAD enlarged     ANGIOGRAM  2/09    Sev.single vessel disease,Mod LV dysf.distal LAD 90%,70-75% mid lad just before prev stent,SUSAN to prox.mid LAD, endeavor to distal LAD     ANGIOGRAM  11/13/13    restenosis, stent LAD     BACK SURGERY      back surgery x3     C NONSPECIFIC PROCEDURE      Laminectomy x 3 - (1983 x 2 & 1990)     C NONSPECIFIC PROCEDURE Bilateral 1998    Bunionectomy     C NONSPECIFIC PROCEDURE  1959    Gingival surgery at age 9     CV CORONARY ANGIOGRAM N/A  2019    Procedure: Coronary Angiogram;  Surgeon: Jose Alfredo Crockett MD;  Location: [facility]     CV LEFT HEART CATH N/A 2019    Procedure: Left Heart Cath;  Surgeon: Jose Alfredo Crockett MD;  Location: [facility]     CV PCI ASPIRATION THROMECTOMY N/A 2019    Procedure: PCI Aspiration Thrombectomy;  Surgeon: Jose Alfredo Crockett MD;  [facility]: [facility]     CV PCI STENT DRUG ELUTING N/A 2019    Procedure: PCI Stent Drug Eluting;  Surgeon: Jose Alfredo Crockett MD;  Location: [facility]     HEART CATH LEFT HEART CATH  2017    SUSAN to OM3     INCISION AND DRAINAGE TOE, COMBINED Bilateral 2018    Procedure: COMBINED INCISION AND DRAINAGE TOE;  1) Irrigation and debridement ulcer left great toe  2) Amputation 3rd toe right foot at distal interphalangeal joint level;  Surgeon: Miki Harp MD;  Location: [facility] OR     IRRIGATION AND DEBRIDEMENT FOOT, COMBINED Left 3/12/2019    Procedure: Debridement of left foot ulcer sub first MPJ 2 x 2.5 cm down to and including subcutaneous tissue, callus debridement for preulcerative lesion, left second toe.;  Surgeon: Ryan Bhagat DPM;  Location: [facility] OR     ORTHOPEDIC SURGERY      bunion surgery both feet     ORTHOPEDIC SURGERY      left shoulder     SOFT TISSUE SURGERY      debridement of toe numerous time     VASCULAR SURGERY      7 stents in heart       Prior to Admission Medications   Prior to Admission Medications   Prescriptions Last Dose Informant Patient Reported? Taking?   ASPIRIN NOT PRESCRIBED (INTENTIONAL)   No No   Sig: Antiplatelet medication not prescribed intentionally due to Current anticoagulant therapy (warfarin/enoxaparin)   Continuous Blood Gluc  (FREESTYLE MOISES 14 DAY READER) DRAGAN   No No   Si each daily For moises personal continuous glucose monitor system. Sensors are changed every 14 days( 2 sensors--> 1 month). 1 reader.   Continuous Blood Gluc Sensor  (FREESTYLE CLARITA 14 DAY SENSOR) [facility]   No No   Si each daily For use with Freestyle Clarita reader for continuous monitioring of blood glucose levels along with sensor. Replace sensor every 14 days.   apixaban ANTICOAGULANT (ELIQUIS) 5 MG tablet   No No   Sig: Take 1 tablet (5 mg) by mouth 2 times daily   atorvastatin (LIPITOR) 40 MG tablet   No No   Sig: Take 1 tablet (40 mg) by mouth every evening   carvedilol (COREG) 25 MG tablet   Yes No   Sig: Take 1 tablet (25 mg) by mouth 2 times daily (with meals)   clopidogrel (PLAVIX) 75 MG tablet   No No   Sig: Take 1 tablet (75 mg) by mouth daily   furosemide (LASIX) 40 MG tablet   No No   Sig: Take 1 tablet (40 mg) by mouth daily   gabapentin (NEURONTIN) 100 MG capsule   No No   Sig: Take 3 capsules (300 mg) by mouth At Bedtime   insulin aspart (NOVOLOG FLEXPEN) 100 UNIT/ML pen   Yes No   Sig: Inject Subcutaneous 3 times daily (with meals) Sliding Scale  Do not give sliding scale insulin if Pre-Meal BG less than 140.   For Pre-Meal:   - 200 give 2 units.    - 250 give 4 units.    - 300 give 6 units.    - 350 give 8 units.    - 400 give 10 units.   insulin aspart (NOVOLOG FLEXPEN) 100 UNIT/ML pen   Yes No   Sig: Inject 8 Units Subcutaneous daily (with lunch)   insulin aspart (NOVOLOG FLEXPEN) 100 UNIT/ML pen   Yes No   Sig: Inject 10 Units Subcutaneous daily (with dinner)   insulin aspart (NOVOLOG PEN) 100 UNIT/ML pen   Yes No   Sig: Inject 8 Units Subcutaneous daily (with breakfast)    insulin glargine (LANTUS PEN) 100 UNIT/ML pen   No No   Sig: Inject 42 Units Subcutaneous every morning   insulin pen needle 31G X 6 MM  Self No No   Sig: Use  as directed.   isosorbide mononitrate (IMDUR) 60 MG 24 hr tablet   No No   Sig: Take 1 tablet (60 mg) by mouth daily   levothyroxine (SYNTHROID, LEVOTHROID) 137 MCG tablet  Self Yes No   Sig: Take 137 mcg by mouth daily    lisinopril (ZESTRIL) 40 MG tablet   No No   Sig: Take 1 tablet (40 mg) by  mouth daily   nitroglycerin (NITROSTAT) 0.4 MG sublingual tablet  Self No No   Sig: Place 1 tablet (0.4 mg) under the tongue every 5 minutes as needed for chest pain   order for DME  Self No No   Sig: Equipment being ordered: Walker Wheels () and Walker ()  Treatment Diagnosis: Impaired gait, heel WB bilaterally.   order for DME   No No   Sig: Equipment being ordered: size 12 surgical shoe   pantoprazole (PROTONIX) 40 MG enteric coated tablet  Self No No   Sig: Take 1 tablet (40 mg) by mouth every morning (before breakfast)   spironolactone (ALDACTONE) 25 MG tablet   Yes No   Sig: Take 1 tablet (25 mg) by mouth daily      Facility-Administered Medications: None     Allergies   Allergies   Allergen Reactions     No Known Allergies        Social History   I have personally reviewed the social history with the patient showing.  Social History     Tobacco Use     Smoking status: Former Smoker     Packs/day: 1.00     Years: 25.00     Pack years: 25.00     Types: Cigarettes     Start date:      Last attempt to quit: 2005     Years since quittin.9     Smokeless tobacco: Never Used   Substance Use Topics     Alcohol use: Yes     Alcohol/week: 4.0 standard drinks     Types: 4 Shots of liquor per week     Frequency: Never     Comment: OCCASSIONALLY      Family History   I have reviewed this patient's family history and it is not contributory to the admission .       Review of Systems   Except as noted in the HPI, a 12-system Review of Systems was found to be negative.     Physical Exam   Vital Signs with Ranges  Temp:  [98.1  F (36.7  C)] 98.1  F (36.7  C)  Pulse:  [91] 91  Resp:  [18] 18  BP: (130)/(80) 130/80  SpO2:  [98 %] 98 %  247 lbs 3.2 oz    GEN:  Alert, oriented x 3, appears comfortable, NAD.  HEENT:  Normocephalic/atraumatic, no scleral icterus, no nasal discharge, mouth moist.  CV:  Regular rate and rhythm, no murmur or JVD.  S1 + S2 noted, no S3 or S4.  LUNGS:  Clear to auscultation  bilaterally without rales/rhonchi/wheezing/retractions.  Symmetric chest rise on inhalation noted.  ABD:  Active bowel sounds, soft, non-tender/non-distended.  No rebound/guarding/rigidity.  EXT:  No edema or cyanosis.  No joint synovitis noted.  SKIN:  Dry to touch, erythema noted in the visualized areas and demarcated.          Data   I personally reviewed     Results for orders placed or performed during the hospital encounter of 07/16/20 (from the past 24 hour(s))   CBC with platelets differential   Result Value Ref Range    WBC 5.6 4.0 - 11.0 10e9/L    RBC Count 4.28 (L) 4.4 - 5.9 10e12/L    Hemoglobin 12.3 (L) 13.3 - 17.7 g/dL    Hematocrit 37.7 (L) 40.0 - 53.0 %    MCV 88 78 - 100 fl    MCH 28.7 26.5 - 33.0 pg    MCHC 32.6 31.5 - 36.5 g/dL    RDW 12.8 10.0 - 15.0 %    Platelet Count 224 150 - 450 10e9/L    Diff Method Automated Method     % Neutrophils 64.8 %    % Lymphocytes 19.2 %    % Monocytes 11.5 %    % Eosinophils 3.6 %    % Basophils 0.5 %    % Immature Granulocytes 0.4 %    Nucleated RBCs 0 0 /100    Absolute Neutrophil 3.6 1.6 - 8.3 10e9/L    Absolute Lymphocytes 1.1 0.8 - 5.3 10e9/L    Absolute Monocytes 0.6 0.0 - 1.3 10e9/L    Absolute Eosinophils 0.2 0.0 - 0.7 10e9/L    Absolute Basophils 0.0 0.0 - 0.2 10e9/L    Abs Immature Granulocytes 0.0 0 - 0.4 10e9/L    Absolute Nucleated RBC 0.0    Basic metabolic panel   Result Value Ref Range    Sodium 133 133 - 144 mmol/L    Potassium 4.2 3.4 - 5.3 mmol/L    Chloride 99 94 - 109 mmol/L    Carbon Dioxide 27 20 - 32 mmol/L    Anion Gap 7 3 - 14 mmol/L    Glucose 421 (H) 70 - 99 mg/dL    Urea Nitrogen 26 7 - 30 mg/dL    Creatinine 1.22 0.66 - 1.25 mg/dL    GFR Estimate 60 (L) >60 mL/min/[1.73_m2]    GFR Estimate If Black 69 >60 mL/min/[1.73_m2]    Calcium 8.8 8.5 - 10.1 mg/dL   Lactic acid whole blood   Result Value Ref Range    Lactic Acid 1.6 0.7 - 2.0 mmol/L   CRP inflammation   Result Value Ref Range    CRP Inflammation 15.2 (H) 0.0 - 8.0 mg/L        Disclaimer: This note consists of symbols derived from keyboarding, dictation and/or voice recognition software. As a result, there may be errors in the script that have gone undetected. Please consider this when interpreting information found in this chart.

## 2020-07-16 NOTE — TELEPHONE ENCOUNTER
Called and spoke to patient he says that he has had chills, overheating, unsure of fever - few days    Says it stinks and there is blood coming from the wound.    Please advise    Did mention that he may need to just go to the ER but wanted to ask Dr. Mckeon first.    Shelbi Barr ATC

## 2020-07-16 NOTE — TELEPHONE ENCOUNTER
Called and spoke the patient wife and explained that Dr. Mckeon wanted him to go to the ER to get it looked at.     She asked if another provider was available and what they would do at the ER (surgery or antibiotics?) if no one else was. No one else was available and it was explained that the next course of tx would depend on status of the wound.    Noted understanding and said they would go.    Shelbi Barr ATC

## 2020-07-16 NOTE — TELEPHONE ENCOUNTER
Reason for call:  Symptom   Symptom or request: Possible of infection of lt leg wound, spreading redness and pain noted.     Duration (how long have symptoms been present): Redness was noted yesterday by wife. Pain has been present for 1 week.   Have you been treated for this before? Yes    Additional comments: Wife is requesting antibiotics to treat.     Phone number to reach patient:  Home number on file [phone] (home)    Best Time:  any    Can we leave a detailed message on this number?  YES    Travel screening: Not Applicable       Susan Jaquez on 7/16/2020 at 11:38 AM

## 2020-07-16 NOTE — TELEPHONE ENCOUNTER
I recommend going to the emergency room right away.    Please let patient know.     Татьяна Mckeon DPM

## 2020-07-17 VITALS
OXYGEN SATURATION: 100 % | DIASTOLIC BLOOD PRESSURE: 78 MMHG | TEMPERATURE: 97.4 F | HEIGHT: 76 IN | RESPIRATION RATE: 18 BRPM | HEART RATE: 74 BPM | SYSTOLIC BLOOD PRESSURE: 137 MMHG | BODY MASS INDEX: 30.22 KG/M2 | WEIGHT: 248.2 LBS

## 2020-07-17 PROBLEM — T14.8XXA WOUND INFECTION: Status: ACTIVE | Noted: 2020-07-17

## 2020-07-17 PROBLEM — L08.9 WOUND INFECTION: Status: ACTIVE | Noted: 2020-07-17

## 2020-07-17 LAB
ANION GAP SERPL CALCULATED.3IONS-SCNC: 5 MMOL/L (ref 3–14)
BUN SERPL-MCNC: 22 MG/DL (ref 7–30)
CALCIUM SERPL-MCNC: 8.3 MG/DL (ref 8.5–10.1)
CHLORIDE SERPL-SCNC: 105 MMOL/L (ref 94–109)
CO2 SERPL-SCNC: 28 MMOL/L (ref 20–32)
CREAT SERPL-MCNC: 1.19 MG/DL (ref 0.66–1.25)
ERYTHROCYTE [DISTWIDTH] IN BLOOD BY AUTOMATED COUNT: 12.8 % (ref 10–15)
GFR SERPL CREATININE-BSD FRML MDRD: 62 ML/MIN/{1.73_M2}
GLUCOSE BLDC GLUCOMTR-MCNC: 143 MG/DL (ref 70–99)
GLUCOSE BLDC GLUCOMTR-MCNC: 164 MG/DL (ref 70–99)
GLUCOSE BLDC GLUCOMTR-MCNC: 233 MG/DL (ref 70–99)
GLUCOSE SERPL-MCNC: 170 MG/DL (ref 70–99)
HCT VFR BLD AUTO: 35 % (ref 40–53)
HGB BLD-MCNC: 11.3 G/DL (ref 13.3–17.7)
MCH RBC QN AUTO: 28.7 PG (ref 26.5–33)
MCHC RBC AUTO-ENTMCNC: 32.3 G/DL (ref 31.5–36.5)
MCV RBC AUTO: 89 FL (ref 78–100)
PLATELET # BLD AUTO: 190 10E9/L (ref 150–450)
POTASSIUM SERPL-SCNC: 3.8 MMOL/L (ref 3.4–5.3)
RBC # BLD AUTO: 3.94 10E12/L (ref 4.4–5.9)
SARS-COV-2 RNA SPEC QL NAA+PROBE: NOT DETECTED
SODIUM SERPL-SCNC: 138 MMOL/L (ref 133–144)
SPECIMEN SOURCE: NORMAL
WBC # BLD AUTO: 5 10E9/L (ref 4–11)

## 2020-07-17 PROCEDURE — 00000146 ZZHCL STATISTIC GLUCOSE BY METER IP

## 2020-07-17 PROCEDURE — 11042 DBRDMT SUBQ TIS 1ST 20SQCM/<: CPT

## 2020-07-17 PROCEDURE — 25000131 ZZH RX MED GY IP 250 OP 636 PS 637: Performed by: HOSPITALIST

## 2020-07-17 PROCEDURE — 25000128 H RX IP 250 OP 636: Performed by: HOSPITALIST

## 2020-07-17 PROCEDURE — G0378 HOSPITAL OBSERVATION PER HR: HCPCS

## 2020-07-17 PROCEDURE — 80048 BASIC METABOLIC PNL TOTAL CA: CPT | Performed by: HOSPITALIST

## 2020-07-17 PROCEDURE — 25800030 ZZH RX IP 258 OP 636: Performed by: HOSPITALIST

## 2020-07-17 PROCEDURE — 25000132 ZZH RX MED GY IP 250 OP 250 PS 637: Performed by: HOSPITALIST

## 2020-07-17 PROCEDURE — 96361 HYDRATE IV INFUSION ADD-ON: CPT | Mod: 59

## 2020-07-17 PROCEDURE — 99222 1ST HOSP IP/OBS MODERATE 55: CPT | Mod: 24 | Performed by: PODIATRIST

## 2020-07-17 PROCEDURE — 96372 THER/PROPH/DIAG INJ SC/IM: CPT

## 2020-07-17 PROCEDURE — 36415 COLL VENOUS BLD VENIPUNCTURE: CPT | Performed by: HOSPITALIST

## 2020-07-17 PROCEDURE — 11042 DBRDMT SUBQ TIS 1ST 20SQCM/<: CPT | Mod: 79 | Performed by: PODIATRIST

## 2020-07-17 PROCEDURE — 99217 ZZC OBSERVATION CARE DISCHARGE: CPT | Performed by: INTERNAL MEDICINE

## 2020-07-17 PROCEDURE — 85027 COMPLETE CBC AUTOMATED: CPT | Performed by: HOSPITALIST

## 2020-07-17 PROCEDURE — 96376 TX/PRO/DX INJ SAME DRUG ADON: CPT | Mod: 59

## 2020-07-17 RX ORDER — CLINDAMYCIN HCL 300 MG
300 CAPSULE ORAL 4 TIMES DAILY
Qty: 28 CAPSULE | Refills: 0 | Status: SHIPPED | OUTPATIENT
Start: 2020-07-17 | End: 2020-07-17

## 2020-07-17 RX ORDER — CLINDAMYCIN HCL 300 MG
300 CAPSULE ORAL 4 TIMES DAILY
Qty: 28 CAPSULE | Refills: 0 | Status: SHIPPED | OUTPATIENT
Start: 2020-07-17 | End: 2020-07-30

## 2020-07-17 RX ADMIN — INSULIN GLARGINE 42 UNITS: 100 INJECTION, SOLUTION SUBCUTANEOUS at 08:43

## 2020-07-17 RX ADMIN — PANTOPRAZOLE SODIUM 40 MG: 40 TABLET, DELAYED RELEASE ORAL at 08:44

## 2020-07-17 RX ADMIN — CLOPIDOGREL BISULFATE 75 MG: 75 TABLET ORAL at 08:44

## 2020-07-17 RX ADMIN — HYDROCODONE BITARTRATE AND ACETAMINOPHEN 2 TABLET: 5; 325 TABLET ORAL at 10:41

## 2020-07-17 RX ADMIN — TAZOBACTAM SODIUM AND PIPERACILLIN SODIUM 3.38 G: 375; 3 INJECTION, SOLUTION INTRAVENOUS at 08:44

## 2020-07-17 RX ADMIN — APIXABAN 5 MG: 5 TABLET, FILM COATED ORAL at 08:44

## 2020-07-17 RX ADMIN — TAZOBACTAM SODIUM AND PIPERACILLIN SODIUM 3.38 G: 375; 3 INJECTION, SOLUTION INTRAVENOUS at 02:38

## 2020-07-17 RX ADMIN — ISOSORBIDE MONONITRATE 60 MG: 60 TABLET, EXTENDED RELEASE ORAL at 08:44

## 2020-07-17 RX ADMIN — INSULIN ASPART 3 UNITS: 100 INJECTION, SOLUTION INTRAVENOUS; SUBCUTANEOUS at 12:26

## 2020-07-17 RX ADMIN — FUROSEMIDE 40 MG: 40 TABLET ORAL at 08:44

## 2020-07-17 RX ADMIN — SPIRONOLACTONE 25 MG: 25 TABLET ORAL at 08:44

## 2020-07-17 RX ADMIN — SODIUM CHLORIDE: 9 INJECTION, SOLUTION INTRAVENOUS at 00:01

## 2020-07-17 RX ADMIN — CARVEDILOL 25 MG: 25 TABLET, FILM COATED ORAL at 08:43

## 2020-07-17 RX ADMIN — INSULIN ASPART 1 UNITS: 100 INJECTION, SOLUTION INTRAVENOUS; SUBCUTANEOUS at 08:43

## 2020-07-17 ASSESSMENT — ACTIVITIES OF DAILY LIVING (ADL)
ADLS_ACUITY_SCORE: 16

## 2020-07-17 NOTE — ED NOTES
"[facility]  ED Nurse Handoff Report    Jayro Elder is a 70 year old male   ED Chief complaint: Wound Check  . ED Diagnosis:   Final diagnoses:   Diabetic ulcer of toe of left foot associated with type 2 diabetes mellitus, with other ulcer severity (H)   Hyperglycemia     Allergies:   Allergies   Allergen Reactions     No Known Allergies        Code Status: Full Code  Activity level - Baseline/Home:  Independent. Activity Level - Current:   Assist X 1. Lift room needed: No. Bariatric: No   Needed: No   Isolation: No. Infection: COVID pending.     Vital Signs:   Vitals:    07/16/20 1654 07/16/20 1815 07/16/20 1845 07/16/20 1915   BP: 130/80 128/84 123/78 126/84   Pulse: 91 85 74 77   Resp: 18      Temp: 98.1  F (36.7  C)      TempSrc: Temporal      SpO2: 98% 94% 99% 99%   Weight: 112.1 kg (247 lb 3.2 oz)      Height: 1.93 m (6' 4\")          Cardiac Rhythm:  ,      Pain level:    Patient confused: No. Patient Falls Risk: Yes.   Elimination Status: Has voided   Patient Report - Initial Complaint: ABCs intact. Pt hx L foot wound. Pt states its been getting worse the past 3-4 days. Pt c/o chill x 2 days. Pt denies CP, SOB, fever, sore throat or rash. Focused Assessment: Skin - Skin WDL: -WDL except  Skin Color/Characteristics: maroon/purple  Skin Temperature: warm  Skin Moisture: dry  Skin Integrity: wound  Skin Comment: Pt c/o 3-4 days of a large wound to bottom of the left big toe area that is dark purple/brown in color. Pt c/o increased pain to area with walking. Pt has noticed odorous drainage from the wound, describing it as a dark red color. Pt has hx of this as he has hx of DM2. CMS intact. No fevers but pt c/o having chills.    Tests Performed: IV labs, imaging. Abnormal Results:   Labs Ordered and Resulted from Time of ED Arrival Up to the Time of Departure from the ED   CBC WITH PLATELETS DIFFERENTIAL - Abnormal; Notable for the following components:       Result Value    RBC Count " 4.28 (*)     Hemoglobin 12.3 (*)     Hematocrit 37.7 (*)     All other components within normal limits   BASIC METABOLIC PANEL - Abnormal; Notable for the following components:    Glucose 421 (*)     GFR Estimate 60 (*)     All other components within normal limits   CRP INFLAMMATION - Abnormal; Notable for the following components:    CRP Inflammation 15.2 (*)     All other components within normal limits   LACTIC ACID WHOLE BLOOD   ERYTHROCYTE SEDIMENTATION RATE AUTO   COVID-19 VIRUS (CORONAVIRUS) BY PCR   BLOOD CULTURE   BLOOD CULTURE     Foot XR, G/E 3 views, left   Final Result   IMPRESSION: Postoperative changes of amputation of the distal aspect of the second toe. There is some cortical irregularity to the medial aspect of the first metatarsal head and surrounding soft tissue swelling. However, the bone appearance is unchanged    from the prior examination. The soft tissue swelling has increased. The stability of the bone findings is not suggestive of osteomyelitis. There is degenerative change across the midfoot. Plantar calcaneal spurring.         Treatments provided: IV fluids, abx  Family Comments: N/A  OBS brochure/video discussed/provided to patient:  N/A  ED Medications:   Medications   piperacillin-tazobactam (ZOSYN) intermittent infusion 4.5 g (4.5 g Intravenous New Bag 7/16/20 1917)   0.9% sodium chloride BOLUS (500 mLs Intravenous New Bag 7/16/20 1908)   insulin aspart (NovoLOG) injection (RAPID ACTING) (has no administration in time range)     Drips infusing:  Yes  For the majority of the shift, the patient's behavior Green. Interventions performed were N/A.    Sepsis treatment initiated: No       ED Nurse Name/Phone Number: Che Ivey RN,   7:34 PM    RECEIVING UNIT ED HANDOFF REVIEW    Above ED Nurse Handoff Report was reviewed: Yes  Reviewed by: Shannan Irby RN on July 16, 2020 at 7:50 PM

## 2020-07-17 NOTE — ED NOTES
RN spoke with pharmacy in regards to the Novolog pen being dispensed. Pharmacy will speak with the RN on the floor he is going to since he will be going upstairs in the next 5-10 minutes.

## 2020-07-17 NOTE — PLAN OF CARE
Pt discharged to home with all belongings and discharge medication. Pt verbalized understanding of discharge instructions. Wound care supplies sent with pt.

## 2020-07-17 NOTE — DISCHARGE SUMMARY
[facility]  Discharge Summary  Name: Jayro Elder    MRN: [id number]  YOB: 1950    Age: 70 year old  Date of Discharge:  7/17/2020  Date of Admission: 7/16/2020  Primary Care Provider: Davion Cordova  Discharge Physician:  Carlee Vega MD  Discharging Service:  Hospitalist      Discharge Diagnoses:  1.  Progressive infection of ulcer on the left foot at the base of his first MPJ status post bedside debridement by podiatry  2.  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  Coronary artery disease and peripheral arterial disease  4.  MARISOL not compliant with CPAP  5.  Paroxysmal atrial fibrillation  6.  Hypothyroidism  7.  GERD     Follow-ups Needed After Discharge   Follow-up with podiatry in clinic in 7 to 10 days for wound check    Unresulted Labs Ordered in the Past 30 Days of this Admission     No orders found from 10/16/2018 to 12/16/2018.        Hospital Course:  Jayro Elder is a 70-year-old male with past medical history of coronary artery disease, peripheral arterial disease, paroxysmal atrial fibrillation, MARISOL not compliant with CPAP, insulin-dependent type 2 diabetes complicated by peripheral neuropathy and known left diabetic foot wound who was admitted on 7/16/2020 with worsening of his foot wound with associated infection.    The patient is typically followed by podiatry.  He has undergone prior amputation due to diabetes complications.  He did have a pre-ulcerative lesion on the plantar surface of the MPJ but no open wound.  Over the past 4 days prior to admission he had worsening pain, bloody drainage, redness and a foul odor from the area which prompted him to come to the emergency department.  He did have an x-ray which did not show evidence of osteomyelitis.  He was started on Zosyn.  He was seen by podiatry who did debride the wound at the bedside.  There was no necrosis, purulence or deep probing noted per the podiatry note.  He will need  "to have an Iodosorb dressing change every other day.  Orthotics was also consulted for a new DH2 offloading shoe.  Podiatry was okay with discharge home with a 7-day course of oral clindamycin.  He will need to follow-up in podiatry clinic in 7 to 10 days for recheck of his wound.  He was otherwise continued on all of his prior to admission medications.     Discharge Disposition:  Discharged to home     Allergies:  Allergies   Allergen Reactions     No Known Allergies         Condition on Discharge:  Discharge condition: Stable   Discharge vitals: Blood pressure 137/78, pulse 74, temperature 97.4  F (36.3  C), temperature source Oral, resp. rate 18, height 1.93 m (6' 4\"), weight 112.6 kg (248 lb 3.2 oz), SpO2 100 %.   Code status on discharge: Full Code     History of Illness:  See detailed admission note for full details.    Physical Exam:  Blood pressure 137/78, pulse 74, temperature 97.4  F (36.3  C), temperature source Oral, resp. rate 18, height 1.93 m (6' 4\"), weight 112.6 kg (248 lb 3.2 oz), SpO2 100 %.  Wt Readings from Last 1 Encounters:   07/16/20 112.6 kg (248 lb 3.2 oz)     General: Alert, awake, no acute distress.  HEENT: Normocephalic, atraumatic, eyes anicteric and without scleral injection, EOMI, MMM.  Cardiac: RRR, normal S1, S2.  No m/g/r. No LE edema.  Pulmonary: Normal chest rise, normal work of breathing.  Lungs CTAB without crackles or wheezing  Abdomen: soft, non-tender, non-distended.  Normoactive BS.  No guarding or rebound tenderness.  Extremities: Dressing in place over left foot (not removed as podiatry had debrided this earlier today and replaced dressing).  Warm, well perfused.  Skin: Dressing over left foot.  Warm and Dry.  Neuro: No focal deficits noted.  Speech clear.  Coordination and strength grossly normal.  Psych: Appropriate affect. Alert and oriented x3    Procedures other than Imaging:  Bedside debridement of left foot ulcer by podiatry     Imaging:  Results for orders placed " or performed during the hospital encounter of 07/16/20   Foot XR, G/E 3 views, left    Narrative    EXAM: XR FOOT LT G/E 3 VW  LOCATION: [facility]  DATE/TIME: 7/16/2020 6:30 PM    INDICATION: Foot ulcer.  COMPARISON: 04/18/2020.      Impression    IMPRESSION: Postoperative changes of amputation of the distal aspect of the second toe. There is some cortical irregularity to the medial aspect of the first metatarsal head and surrounding soft tissue swelling. However, the bone appearance is unchanged   from the prior examination. The soft tissue swelling has increased. The stability of the bone findings is not suggestive of osteomyelitis. There is degenerative change across the midfoot. Plantar calcaneal spurring.        Consultations:  Consultations This Hospital Stay   PODIATRY IP CONSULT  PHARMACY DISCHARGE EDUCATION BY PHARMACIST  ORTHOSIS EXTREMITY LOWER REFERRAL IP CONSULT  CARE COORDINATOR IP CONSULT     Recent Lab Results:  Recent Labs   Lab 07/17/20  0704 07/16/20  1744   WBC 5.0 5.6   HGB 11.3* 12.3*   HCT 35.0* 37.7*   MCV 89 88    224     Recent Labs   Lab 07/17/20  0704 07/16/20  1744    133   POTASSIUM 3.8 4.2   CHLORIDE 105 99   CO2 28 27   ANIONGAP 5 7   * 421*   BUN 22 26   CR 1.19 1.22   GFRESTIMATED 62 60*   GFRESTBLACK 71 69   BETTIE 8.3* 8.8          Pending Results:    Unresulted Labs Ordered in the Past 30 Days of this Admission     Date and Time Order Name Status Description    7/16/2020 1907 Blood culture Preliminary     7/16/2020 1907 Blood culture Preliminary     7/16/2020 1828 Asymptomatic COVID-19 Virus (Coronavirus) by PCR In process            Discharge Instructions and Follow-Up:   Discharge Orders      Follow-up and recommended labs and tests     Thank you for choosing [facility]!    Follow up with Dr Loo at one of the clinic locations within 7-10 days of discharge.    [facility]  LOCATIONS:    [address]   [place] [address]  [phone]      THURSDAY - [place]  [address]  [place] [address] [phone]     Wound care and dressings    Every other day iodosorb application to base of wound with dry gauze bandage     Activity    Heel WB in surgical shoe.  Limit extra-curricular activities/time on feet to allow wound to heal and infection to resolve.     Reason for your hospital stay    You were hospitalized for a wound on the left foot.  Podiatry saw you and debrided this wound.  At this time you do not require further debridement or surgery.  You need to wear the surgical shoe when you are walking on his foot.  He will take clindamycin for 1 week to treat the infection.  Change the bandage every other day.  Follow-up with podiatry in clinic in the next 7 to 10 days.     Full Code     Diet    Follow this diet upon discharge: Orders Placed This Encounter      Moderate Consistent CHO Diet     Discharge Medications   Current Discharge Medication List      START taking these medications    Details   Cadexomer Iodine, topical, 0.9% (IODOSORB) 0.9 % GEL gel Apply to ulcer on left foot every other day with gauze dressing.  Qty: 40 g, Refills: 0    Associated Diagnoses: Diabetic infection of left foot (H)      clindamycin (CLEOCIN) 300 MG capsule Take 1 capsule (300 mg) by mouth 4 times daily  Qty: 28 capsule, Refills: 0    Associated Diagnoses: Diabetic infection of left foot (H)         CONTINUE these medications which have NOT CHANGED    Details   apixaban ANTICOAGULANT (ELIQUIS) 5 MG tablet Take 1 tablet (5 mg) by mouth 2 times daily    Associated Diagnoses: Chronic atrial fibrillation (H)      atorvastatin (LIPITOR) 40 MG tablet Take 1 tablet (40 mg) by mouth every evening  Qty: 90 tablet, Refills: 3    Associated Diagnoses: Cerebrovascular accident (CVA) due to embolism of left posterior cerebral artery (H)      carvedilol (COREG) 25 MG tablet Take  25 mg by mouth daily   Qty:        clopidogrel (PLAVIX) 75 MG tablet Take 1 tablet (75 mg) by mouth daily  Qty: 90 tablet, Refills: 3    Associated Diagnoses: Unstable angina (H)      furosemide (LASIX) 40 MG tablet Take 1 tablet (40 mg) by mouth daily  Qty: 90 tablet, Refills: 3    Associated Diagnoses: Unstable angina (H)      gabapentin (NEURONTIN) 100 MG capsule Take 3 capsules (300 mg) by mouth At Bedtime  Qty: 270 capsule, Refills: 2    Associated Diagnoses: Type 2 diabetes mellitus with diabetic nephropathy, with long-term current use of insulin (H); Restless legs syndrome (RLS)      !! insulin aspart (NOVOLOG FLEXPEN) 100 UNIT/ML pen Inject 8 Units Subcutaneous daily (with lunch)      !! insulin aspart (NOVOLOG FLEXPEN) 100 UNIT/ML pen Inject 10 Units Subcutaneous daily (with dinner)      !! insulin aspart (NOVOLOG FLEXPEN) 100 UNIT/ML pen Inject Subcutaneous 3 times daily (with meals) Sliding Scale  Do not give sliding scale insulin if Pre-Meal BG less than 140.   For Pre-Meal:   - 200 give 2 units.    - 250 give 4 units.    - 300 give 6 units.    - 350 give 8 units.    - 400 give 10 units.      !! insulin aspart (NOVOLOG PEN) 100 UNIT/ML pen Inject 8 Units Subcutaneous daily (with breakfast)       insulin glargine (LANTUS PEN) 100 UNIT/ML pen Inject 42 Units Subcutaneous every morning  Qty: 30 mL, Refills: 1    Comments: If Lantus is not covered by insurance, may substitute Basaglar at same dose and frequency.    Associated Diagnoses: Type 2 diabetes mellitus with hyperosmolarity without coma, with long-term current use of insulin (H)      isosorbide mononitrate (IMDUR) 60 MG 24 hr tablet Take 1 tablet (60 mg) by mouth daily  Qty: 90 tablet, Refills: 0    Associated Diagnoses: CAD (coronary artery disease)      levothyroxine (SYNTHROID, LEVOTHROID) 137 MCG tablet Take 137 mcg by mouth every evening   Qty: 90 tablet, Refills: 3      pantoprazole (PROTONIX) 40 MG enteric coated  tablet Take 1 tablet (40 mg) by mouth every morning (before breakfast)  Qty: 30 tablet, Refills: 0    Associated Diagnoses: GERD (gastroesophageal reflux disease)      spironolactone (ALDACTONE) 25 MG tablet Take 1 tablet (25 mg) by mouth daily  Qty:        ASPIRIN NOT PRESCRIBED (INTENTIONAL) Antiplatelet medication not prescribed intentionally due to Current anticoagulant therapy (warfarin/enoxaparin)  Qty:      Associated Diagnoses: Chronic atrial fibrillation (H)      insulin pen needle 31G X 6 MM Use  as directed.  Qty: 100 each, Refills: prn    Associated Diagnoses: Type 2 diabetes mellitus with diabetic peripheral angiopathy without gangrene, unspecified long term insulin use status      nitroglycerin (NITROSTAT) 0.4 MG sublingual tablet Place 1 tablet (0.4 mg) under the tongue every 5 minutes as needed for chest pain  Qty: 50 tablet, Refills: 0    Associated Diagnoses: Other chest pain      !! order for DME Equipment being ordered: size 12 surgical shoe  Qty: 1 Device, Refills: 0    Associated Diagnoses: Diabetic polyneuropathy associated with type 2 diabetes mellitus (H); Ulcer of left foot, with fat layer exposed (H)      !! order for DME Equipment being ordered: Walker Wheels () and Walker ()  Treatment Diagnosis: Impaired gait, heel WB bilaterally.  Qty: 1 each, Refills: 0    Associated Diagnoses: Diabetic ulcer of left midfoot associated with diabetes mellitus of other type, unspecified ulcer stage (H)       !! - Potential duplicate medications found. Please discuss with provider.          Time Spent on this Encounter   I, Carlee Vega MD, personally saw the patient today and spent greater than 30 minutes discharging this patient.    Carlee Vega MD

## 2020-07-17 NOTE — PHARMACY-ADMISSION MEDICATION HISTORY
Admission medication history interview status for this patient is complete. See [facility] admission navigator for allergy information, prior to admission medications and immunization status.     Medication history interview done via telephone during Covid-19 pandemic, indicate source(s): Patient  Medication history resources (including written lists, pill bottles, clinic record): [employer] dispense records    Changes made to PTA medication list:  Added: none  Deleted: lisinopril, continuous glucose monitor (insurance wouldn't cover)  Changed: carvedilol (pt only takes once daily),     Actions taken by pharmacist (provider contacted, etc):None     Additional medication history information:None    Medication reconciliation/reorder completed by provider prior to medication history?  N   (Y/N)     For patients on insulin therapy: Y  (Y/N)  Sliding scale Novolog: y  Do you have a baseline novolog pre-meal dose:   8 units breakfast/lunch, 10 with dinner  Do you eat three meals a day:  y  How many times do you check your blood glucose per day:  3-4  How many episodes of hypoglycemia do you have per week: -  Do you have a Continuous glucose monitor (CGM) : N (insurance wouldn't cover)  Any specific barriers to therapy? N  (cost, comfortable with injections, confident with current diabetes regimen?)      Prior to Admission medications    Medication Sig Last Dose Taking? Auth Provider   apixaban ANTICOAGULANT (ELIQUIS) 5 MG tablet Take 1 tablet (5 mg) by mouth 2 times daily 7/16/2020 at am Yes Gill Doty PA   atorvastatin (LIPITOR) 40 MG tablet Take 1 tablet (40 mg) by mouth every evening 7/15/2020 at pm Yes Gill Doty PA   carvedilol (COREG) 25 MG tablet Take 25 mg by mouth daily  7/16/2020 at am Yes Gill Doty PA   clopidogrel (PLAVIX) 75 MG tablet Take 1 tablet (75 mg) by mouth daily 7/16/2020 at am Yes Justin Tomlin MD   furosemide (LASIX) 40 MG tablet Take 1 tablet (40 mg) by mouth daily 7/16/2020 at am  Yes Justin Tomlin MD   gabapentin (NEURONTIN) 100 MG capsule Take 3 capsules (300 mg) by mouth At Bedtime 7/15/2020 at pm Yes Davion Cordova PA-C   insulin aspart (NOVOLOG FLEXPEN) 100 UNIT/ML pen Inject 8 Units Subcutaneous daily (with lunch) 7/16/2020 at 1200 Yes Unknown, Entered By History   insulin aspart (NOVOLOG FLEXPEN) 100 UNIT/ML pen Inject 10 Units Subcutaneous daily (with dinner) 7/15/2020 at pm Yes Unknown, Entered By History   insulin aspart (NOVOLOG FLEXPEN) 100 UNIT/ML pen Inject Subcutaneous 3 times daily (with meals) Sliding Scale  Do not give sliding scale insulin if Pre-Meal BG less than 140.   For Pre-Meal:   - 200 give 2 units.    - 250 give 4 units.    - 300 give 6 units.    - 350 give 8 units.    - 400 give 10 units. 7/16/2020 at Unknown time Yes Unknown, Entered By History   insulin aspart (NOVOLOG PEN) 100 UNIT/ML pen Inject 8 Units Subcutaneous daily (with breakfast)  7/16/2020 at am Yes Unknown, Entered By History   insulin glargine (LANTUS PEN) 100 UNIT/ML pen Inject 42 Units Subcutaneous every morning 7/16/2020 at am Yes Sarah Bolivar MD   isosorbide mononitrate (IMDUR) 60 MG 24 hr tablet Take 1 tablet (60 mg) by mouth daily 7/16/2020 at am Yes Gill Doty PA   levothyroxine (SYNTHROID, LEVOTHROID) 137 MCG tablet Take 137 mcg by mouth every evening  7/15/2020 at pm Yes Henrry Figueroa PA-C   pantoprazole (PROTONIX) 40 MG enteric coated tablet Take 1 tablet (40 mg) by mouth every morning (before breakfast) 7/16/2020 at am Yes Ana Frankel MD   spironolactone (ALDACTONE) 25 MG tablet Take 1 tablet (25 mg) by mouth daily 7/16/2020 at am Yes Gill Doty PA   ASPIRIN NOT PRESCRIBED (INTENTIONAL) Antiplatelet medication not prescribed intentionally due to Current anticoagulant therapy (warfarin/enoxaparin) Unknown at Unknown time  Gill Doty PA   insulin pen needle 31G X 6 MM Use  as directed. Unknown at Unknown  time  Vinicio Cook MD   nitroglycerin (NITROSTAT) 0.4 MG sublingual tablet Place 1 tablet (0.4 mg) under the tongue every 5 minutes as needed for chest pain Unknown at Unknown time  Davion Cordova PA-C   order for DME Equipment being ordered: size 12 surgical shoe Unknown at Unknown time  Ryan Bhagat DPM   order for DME Equipment being ordered: Walker Wheels () and Walker ()  Treatment Diagnosis: Impaired gait, heel WB bilaterally. Unknown at Unknown time  Herb Zurita III, MD

## 2020-07-17 NOTE — PROGRESS NOTES
"Transition Communication Hand-off for Care Transitions to Next Level of Care Provider    Name: Jayro Elder  : 1950  MRN #: [id number]  Primary Care Provider: Davion Cordova  Primary Care MD Name: Kevon Cordova  Primary Clinic: [address] [place] [address]  Primary Care Clinic Name: [facility]  Reason for Hospitalization:  Hyperglycemia [R73.9]  Diabetic ulcer of toe of left foot associated with type 2 diabetes mellitus, with other ulcer severity (H) [E11.621, L97.528]  Admit Date/Time: 2020  4:56 PM  Discharge Date: 20  Payor Source: Payor: [employer] / Plan: HU[employer] MEDICARE ADVANTAGE / Product Type: Medicare /     Readmission Assessment Measure (ANDRIA) Risk Score/category: average           Reason for Communication Hand-off Referral: Other A1C 10.4    Discharge Plan: home with podiatry follow up       Concern for non-adherence with plan of care:   No, but prefers in person appointments  Discharge Needs Assessment: diabetic management      Already enrolled in Tele-monitoring program and name of program:  none  Follow-up specialty is recommended: Yes    Follow-up plan:    Future Appointments   Date Time Provider Department Center   2020 10:30 AM Татьяна Mckeon DPM, Podiatry/Foot and Ankle Surgery [facility]   2020  2:15 PM Justin Tomlin MD [facility]       Any outstanding tests or procedures:              Key Recommendations:  Pt admitted with diabetic foot infection. Seen by podiatry already this morning who recommends one more dose of IV abx and discharge home on oral abx. Pt states he needs a new orthotic shoe as his is falling apart. Note that orthotics has been consulted for this. Pts A1C in April was 10.4. pt states he checks his blood sugars 2-3 times a day, follows a diabetic diet and does daily foot checks.CM mentioned his follow up appointments coming up, one of which is cardiology televisit. Pt agitated by the \"televisit\" and stated he " doesn't believe in those and was planning to not participate unless it's in person. CM noted pt declined a diabetic education telephone appointment in May. Pt would likely benefit more from in person visits.      Vickie Escobar RN    AVS/Discharge Summary is the source of truth; this is a helpful guide for improved communication of patient story

## 2020-07-17 NOTE — CONSULTS
"Care Transition Initial Assessment - RN        Met with: Patient.  DATA   Active Problems:    Diabetic infection of left foot (H)    Wound infection       Cognitive Status: awake, alert and oriented.  Primary Care Clinic Name: [facility]  Primary Care MD Name: Kevon Cordova  Contact information and PCP information verified: Yes  Lives With: spouse             Who is your support system?: Wife, Children       Insurance concerns: No Insurance issues identified  ASSESSMENT  Patient currently receives the following services:  none        Identified issues/concerns regarding health management: Pt admitted with diabetic foot infection. Seen by podiatry already this morning who recommends one more dose of IV abx and discharge home on oral abx. Pt states he needs a new orthotic shoe as his is falling apart. Note that orthotics has been consulted for this. Pts A1C in April was 10.4. pt states he checks his blood sugars 2-3 times a day, follows a diabetic diet and does daily foot checks.CM mentioned his follow up appointments coming up, one of which is cardiology televisit. Pt agitated by the \"televisit\" and stated he doesn't believe in those and was planning to not participate unless it's in person. CM noted pt declined a diabetic education telephone appointment in May. Pt would likely benefit more from in person visits.    PLAN  Patient anticipates discharging to home .        Patient anticipates needs for home equipment: Yes, needs new orthotic shoe  Transportation/person available to transport on day of discharge  is family.   Plan/Disposition: Home   Appointments: Dr. Mckeon  7/30 Thursday at 1030.      Care  (CTS) will continue to follow as needed.    Vickie Escobar RN, BSN CTS  Care Coordinator  [facility]   [phone]                "

## 2020-07-17 NOTE — UTILIZATION REVIEW
"    Admission Status; Secondary Review Determination         Under the authority of the Utilization Management Committee, the utilization review process indicated a secondary review on the above patient.  The review outcome is based on review of the medical records, discussions with staff, and applying clinical experience noted on the date of the review.        ()      Inpatient Status Appropriate - This patient's medical care is consistent with medical management for inpatient care and reasonable inpatient medical practice.      (x) Observation Status Appropriate - This patient does not meet hospital inpatient criteria and is placed in observation status. If this patient's primary payer is Medicare and was admitted as an inpatient, Condition Code 44 should be used and patient status changed to \"observation\".   () Admission Status NOT Appropriate - This patient's medical care is not consistent with medical management for Inpatient or Observation Status.          RATIONALE FOR DETERMINATION     \"70 year old male who has a very complicated past medical history as can be seen in the list below.  He presents with worsening of pain in the left foot, foul-smelling, bloody secretion coming to a wound under the first toe metatarsophalangeal joint.  Patient has longstanding history of diabetes, has had some amputation of phalanges and diabetic peripheral neuropathy of his feet.  This wound has been evolving for a long time now but the secretion and the foul smelling is new to him.  He has also some streaky redness in the dorsum of the foot.  He denies fever but has been feeling feverish and with chills.\"    Pt has been seen by podiatry and had bedside I+D. He will be discharging today and observation status is appropriate. I have relayed my recommendation to Dr Vega.    The severity of illness, intensity of service provided, expected LOS make it appropriate for hospital observation.        The information on this document is " developed by the utilization review team in order for the business office to ensure compliance.  This only denotes the appropriateness of proper admission status and does not reflect the quality of care rendered.         The definitions of Inpatient Status and Observation Status used in making the determination above are those provided in the CMS Coverage Manual, Chapter 1 and Chapter 6, section 70.4.      Sincerely,     SHERWIN VALLE MD    Physician Advisor  Utilization Review/ Case Management  [facility].

## 2020-07-17 NOTE — PLAN OF CARE
Orientation: A/O x4  VS: VSS, afebrile  LS: LLL faint crackles, clear all other lobes  GI: CHO counting, Mod CHO diet  :voiding w/o difficulty  Skin: medial plantar calloused wound on foot- covered with dressing to avoid snagging on socks. Streaking red/pink, marked w/ skin pen.  Activity: pain when weight bearing on L foot. Bed rest w/ bathroom privileges   Pain: 5/10, given 2 Norco, recheck 2/10  Lines: PIV infusing NS at 100ml/hr  Plan: Podiatry to follow in AM. Carb counting, pain control, IV ABX.  Shannan Irby RN on 7/16/2020 at 10:39 PM

## 2020-07-17 NOTE — PLAN OF CARE
A&Ox4. VSS. Denies CP, SOB. RPIV infusing NS at 100/hr. Calloused plantar wound on foot covered with mepilex.  at 2300, 164 at 0227. Diabetic peripheral neuropathy at baseline, extremities numb. IV zosyn q6. Mod cho diet, carb counting. Bedrest with bathroom privileges. Will continue with POC, podiatry following.     Abdoulaye Lynn RN on 7/17/2020 at 5:52 AM

## 2020-07-17 NOTE — CONSULTS
Foot & Ankle Surgery  July 17, 2020    CC: diabetic left foot infection    I was asked to see Jayro Elder regarding the chief complaint by:  Dr. KARI Bartlett    HPI:  Pt is a 70 year old male who presents with above complaint.  71 yo neuropathic diabetic male well known to our department was admitted for worsening infection 2/2 to ulcer sub 1st MPJ.  He underwent left 2nd toe amputation by myself 4/18/20 and was last seen 6/17/20 by Dr Mckeon.  The amputation site was healed, and he had a pre-ulcerative lesion sub 1st MPJ but no open wound.  He states that over the past 4 days, he's had increasing pain, bloody drainage, redness and foul odor from the area.      ROS:   Pos for CC.  The patient denies current nausea, vomiting, chills, fevers, belly pain, calf pain, chest pain or SOB.  Complete remainder of ROS is otherwise neg.    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     Cardiomyopathy (H)      Cellulitis of left lower extremity 3/13/2018     Cerebral infarction (H)     eye sight decreased in peripheral of right side and blurry     Diabetic ulcer of left midfoot associated with type 2 diabetes mellitus, with fat layer exposed (H) 3/13/2018     Essential hypertension, benign 11/13/2002     Generalized osteoarthrosis, unspecified site 11/13/2002     Microalbuminuria 3/13/2018    X1     Myocardial infarction (H)      Neuropathy      Sepsis (H)      Sleep apnea     doesn't use it     Substance abuse (H)      Syncope,  unspecified syncope type 3/13/2018     Thyroid disease     takes medicine     Tobacco use disorder 11/13/2002     Type 2 diabetes mellitus with diabetic peripheral angiopathy without gangrene, unspecified long term insulin use status 2005       SXHX:    Past Surgical History:   Procedure Laterality Date     AMPUTATE TOE(S) Right 1/6/2019    Procedure: Right open second toe partial amputation;  Surgeon: Sabino Garcia DPM;  Location: [facility] OR     AMPUTATE TOE(S) Right 1/8/2019    Procedure: 1.  Revision right second toe amputation with resection of distal half of proximal phalanx with full closure.    2.  Debridement of callus/preulcerative lesion, distal left second toe and plantar left first metatarsophalangeal joint.;  Surgeon: Ryan Bhagat DPM;  Location: [facility] OR     AMPUTATE TOE(S) Right 3/12/2019    Procedure: Partial right fourth toe amputation.;  Surgeon: Ryan Bhaagt DPM;  Location: [facility] OR     AMPUTATE TOE(S) Right 5/6/2019    Procedure: Right partial third toe amputation;  Surgeon: Татьяна Mckeon DPM, Podiatry/Foot and Ankle Surgery;  Location: [facility] OR     AMPUTATE TOE(S) Left 4/18/2020    Procedure: LEFT SECOND TOE AMPUTATION;  Surgeon: Ryan Bhagat DPM;  Location: [facility] OR     ANGIOGRAM  6/29/05    subtotal occ.mid LAD,SUSAN mid LAD     ANGIOGRAM  7/05    mild CAD,patent stent,no flow-limiting lesions,sev.LV dysf.LAD enlarged     ANGIOGRAM  2/09    Sev.single vessel disease,Mod LV dysf.distal LAD 90%,70-75% mid lad just before prev stent,SUSAN to prox.mid LAD, endeavor to distal LAD     ANGIOGRAM  11/13/13    restenosis, stent LAD     BACK SURGERY      back surgery x3     C NONSPECIFIC PROCEDURE      Laminectomy x 3 - (1983 x 2 & 1990)     C NONSPECIFIC PROCEDURE Bilateral 1998    Bunionectomy     C NONSPECIFIC PROCEDURE  1959    Gingival surgery at age 9     CV CORONARY ANGIOGRAM N/A 7/8/2019    Procedure: Coronary Angiogram;  Surgeon: Jose Alfredo Crockett MD;  Location:  HEART CARDIAC  CATH LAB     CV LEFT HEART CATH N/A 7/8/2019    Procedure: Left Heart Cath;  Surgeon: Jose Alfredo Crockett MD;  Location:  HE[facility] CARD[facility] CATH LAB     CV PCI ASPIRATION THROMECTOMY N/A 7/8/2019    Procedure: PCI Aspiration Thrombectomy;  Surgeon: Jose Alfredo Crockett MD;  Location:  HEART CARDIAC CATH LAB     CV PCI STENT DRUG ELUTING N/A 7/8/2019    Procedure: PCI Stent Drug Eluting;  Surgeon: Jose Alfredo Crockett MD;  Location: [facility]     HEART CATH LEFT HEART CATH  06/13/2017    SUSAN to OM3     INCISION AND DRAINAGE TOE, COMBINED Bilateral 9/11/2018    Procedure: COMBINED INCISION AND DRAINAGE TOE;  1) Irrigation and debridement ulcer left great toe  2) Amputation 3rd toe right foot at distal interphalangeal joint level;  Surgeon: Miki Harp MD;  Location: [facility] OR     IRRIGATION AND DEBRIDEMENT FOOT, COMBINED Left 3/12/2019    Procedure: Debridement of left foot ulcer sub first MPJ 2 x 2.5 cm down to and including subcutaneous tissue, callus debridement for preulcerative lesion, left second toe.;  Surgeon: Ryan Bhagat DPM;  Location:  OR     ORTHOPEDIC SURGERY      bunion surgery both feet     ORTHOPEDIC SURGERY      left shoulder     SOFT TISSUE SURGERY      debridement of toe numerous time     VASCULAR SURGERY      7 stents in heart        MEDS:    Current Facility-Administered Medications   Medication     apixaban ANTICOAGULANT (ELIQUIS) tablet 5 mg     atorvastatin (LIPITOR) tablet 40 mg     Cadexomer Iodine (topical) 0.9% (IODOSORB) 0.9 % gel     carvedilol (COREG) tablet 25 mg     clopidogrel (PLAVIX) tablet 75 mg     glucose gel 15-30 g    Or     dextrose 50 % injection 25-50 mL    Or     glucagon injection 1 mg     furosemide (LASIX) tablet 40 mg     gabapentin (NEURONTIN) capsule 300 mg     HYDROcodone-acetaminophen (NORCO) 5-325 MG per tablet 1-2 tablet     insulin aspart (NovoLOG) injection (RAPID ACTING)     insulin aspart (NovoLOG) injection (RAPID ACTING)      insulin aspart (NovoLOG) injection (RAPID ACTING)     insulin glargine (LANTUS) injection 42 Units     isosorbide mononitrate (IMDUR) 24 hr tablet 60 mg     levothyroxine (SYNTHROID/LEVOTHROID) tablet 137 mcg     lidocaine (LMX4) cream     lidocaine 1 % 0.1-1 mL     melatonin tablet 1 mg     naloxone (NARCAN) injection 0.1-0.4 mg     nitroGLYcerin (NITROSTAT) sublingual tablet 0.4 mg     ondansetron (ZOFRAN-ODT) ODT tab 4 mg    Or     ondansetron (ZOFRAN) injection 4 mg     pantoprazole (PROTONIX) EC tablet 40 mg     Patient is already receiving anticoagulation with heparin, enoxaparin (LOVENOX), warfarin (COUMADIN)  or other anticoagulant medication     piperacillin-tazobactam (ZOSYN) infusion 3.375 g     sodium chloride (PF) 0.9% PF flush 3 mL     sodium chloride (PF) 0.9% PF flush 3 mL     spironolactone (ALDACTONE) tablet 25 mg       ALL:     Allergies   Allergen Reactions     No Known Allergies        FMH:    Family History   Problem Relation Age of Onset     Cancer - colorectal Sister      Thyroid Disease Brother      Cardiovascular Brother      Diabetes Brother      Cancer Father         tumor in chest and throat     Arthritis Mother      Thyroid Disease Mother      Diabetes Mother      Glaucoma No family hx of      Macular Degeneration No family hx of        SocHx:    Social History     Socioeconomic History     Marital status:      Spouse name: Not on file     Number of children: Not on file     Years of education: Not on file     Highest education level: Not on file   Occupational History     Not on file   Social Needs     Financial resource strain: Not on file     Food insecurity     Worry: Not on file     Inability: Not on file     Transportation needs     Medical: Not on file     Non-medical: Not on file   Tobacco Use     Smoking status: Former Smoker     Packs/day: 1.00     Years: 25.00     Pack years: 25.00     Types: Cigarettes     Start date: 1980     Last attempt to quit: 7/25/2005     Years  since quittin.9     Smokeless tobacco: Never Used   Substance and Sexual Activity     Alcohol use: Yes     Alcohol/week: 4.0 standard drinks     Types: 4 Shots of liquor per week     Frequency: Never     Comment: OCCASSIONALLY      Drug use: No     Sexual activity: Yes     Partners: Female   Lifestyle     Physical activity     Days per week: Not on file     Minutes per session: Not on file     Stress: Not on file   Relationships     Social connections     Talks on phone: Not on file     Gets together: Not on file     Attends Judaism service: Not on file     Active member of club or organization: Not on file     Attends meetings of clubs or organizations: Not on file     Relationship status: Not on file     Intimate partner violence     Fear of current or ex partner: Not on file     Emotionally abused: Not on file     Physically abused: Not on file     Forced sexual activity: Not on file   Other Topics Concern     Parent/sibling w/ CABG, MI or angioplasty before 65F 55M? Yes      Service Not Asked     Blood Transfusions Not Asked     Caffeine Concern No     Comment: 3 cups daily     Occupational Exposure Not Asked     Hobby Hazards Not Asked     Sleep Concern Not Asked     Stress Concern Not Asked     Weight Concern Not Asked     Special Diet Yes     Comment: watching carb intake     Back Care Not Asked     Exercise No     Comment: some walking     Bike Helmet Not Asked     Seat Belt Not Asked     Self-Exams Not Asked   Social History Narrative     Not on file           EXAMINATION:  Gen:   No apparent distress  Neuro:   A&Ox3, no deficits  Psych:    Answering questions appropriately for age and situation with normal affect  Head:    NCAT  Eye:    Visual scanning without deficit  Ear:    Response to auditory stimuli wnl  Lung:    Non-labored breathing on RA noted  Abd:    NTND per patient report  Lymph:    Mild edema/swelling left foot  Vasc:    Pulses palpable, CFT minimally delayed  Neuro:    Light  touch sensation greatly diminished distally  Derm:    Ulceration with callus covering sub 1st MPJ.  Upon debridement, a foul odor was noted. However, this is likely 2/2 to the macerated skin/callus.  The wound is full-thickness, down to fat, but no exposed bone/tendon. There certainly is healthy soft tissue covering the deep fascia.  No purulence is noted, but rather some old bloody drainage.  Erythema receeding from outline.  MSK:    Previous 2nd left toe amp  Calf:    Neg for redness, swelling or tenderness      Imaging:  IMPRESSION: Postoperative changes of amputation of the distal aspect of the second toe. There is some cortical irregularity to the medial aspect of the first metatarsal head and surrounding soft tissue swelling. However, the bone appearance is unchanged   from the prior examination. The soft tissue swelling has increased. The stability of the bone findings is not suggestive of osteomyelitis. There is degenerative change across the midfoot. Plantar calcaneal spurring.    Cultures:    Heavy growth   Staphylococcus aureus      Culture Micro  Abnormal     Heavy growth   Streptococcus agalactiae sero group B     Culture Micro  Abnormal     Moderate growth   Corynebacterium species      Staphylococcus aureus      VICKIE      CLINDAMYCIN  <=0.25 ug/mL  Sensitive      ERYTHROMYCIN  <=0.25 ug/mL  Sensitive      GENTAMICIN  <=0.5 ug/mL  Sensitive      OXACILLIN  <=0.25 ug/mL  Sensitive      TETRACYCLINE  <=1 ug/mL  Sensitive      Trimethoprim/Sulfa  <=0.5/9.5 u...  Sensitive      VANCOMYCIN  <=0.5 ug/mL  Sensitive            Susceptibility      Streptococcus agalactiae sero group b      VICKIE      AMPICILLIN  0.12 ug/mL  Sensitive      CEFOTAXIME  <=0.25 ug/mL  Sensitive      CEFTRIAXONE  <=0.25 ug/mL  Sensitive      CLINDAMYCIN  <=0.06 ug/mL  Sensitive      ERYTHROMYCIN  <=0.06 ug/mL  Sensitive      PENICILLIN  0.06 ug/mL  Sensitive      VANCOMYCIN  0.5 ug/mL  Sensitive           Assessment:  70 year old male  with full-thickness diabetic left foot ulcer with receeding cellulitis       Plan:  Discussed etiologies, anatomy and options  1.  Left foot ulcer with exposed fat and associated cellulitis without exposed bone/tendon in setting of DMII with neuropathy  -Excisional debridement was performed, full-thickness, sharply debriding the wound down to and including exposed sub-cutaneous tissue/fat, excising nonviable tissue to the above dimensions with a 10 blade  -foul odor from macerated skin/callus.  No necrosis, no purulence, no deep probing noted  -Orthosis order for new DH2 offloading shoe  -Iodosorb every other day dressing change  -I think patient is likely ok for discharge after next IV abx dose, and would recommend 7 day course of Clindamycin based on most recent labs.  Order signed  -will sign off.  Advised patient to follow up in clinic in 7-10 days for recheck on the wound.  Please call with questions or acute changes to patient/wound status.      Patient's medical history was reviewed today      Ryan Bhagat DPM FACFAS FACFAOM  Podiatric Foot & Ankle Surgeon  [facility]  [phone]

## 2020-07-20 ENCOUNTER — PATIENT OUTREACH (OUTPATIENT)
Dept: NURSING | Facility: CLINIC | Age: 70
End: 2020-07-20
Payer: COMMERCIAL

## 2020-07-20 ENCOUNTER — TELEPHONE (OUTPATIENT)
Dept: FAMILY MEDICINE | Facility: CLINIC | Age: 70
End: 2020-07-20

## 2020-07-20 NOTE — LETTER
[place] CARE COORDINATION  [facility]  July 20, 2020    Jayro Elder  [address]  [facility] [address]      Dear Jayro,    I am a clinic community health worker who works with Davion Corodva PA-C at the [facility]. I wanted to thank you for spending the time to talk with me.  Below is a description of clinic care coordination and how I can further assist you.      The clinic care coordination team is made up of a registered nurse,  and community health worker who understand the health care system. The goal of clinic care coordination is to help you manage your health and improve access to the health care system in the most efficient manner. The team can assist you in meeting your health care goals by providing education, coordinating services, strengthening the communication among your providers and supporting you with any resource needs.    Please feel free to contact me at [phone] with any questions or concerns. We are focused on providing you with the highest-quality healthcare experience possible and that all starts with you.     Sincerely,     Bisi Hobbs, LANRE   Clinic Care Coordination  [facility]:  [place], [place], [place], [place], and [place]  Phone: [phone]

## 2020-07-20 NOTE — TELEPHONE ENCOUNTER
ED / Discharge Outreach Protocol    Patient Contact    Attempt # 1    Was call answered?  No.  Unable to leave message.    Laya Yo RN on 7/20/2020 at 8:52 AM

## 2020-07-20 NOTE — PROGRESS NOTES
Clinic Care Coordination Contact  Community Health Worker Initial Outreach  Spoke with patient    Chart Review: Referral from discharge report. In ED for diabetic infection of left foot. Discharged to home. Needs follow up with podiatry clinic in 7-10 days for wound check.     CHW introduced self and role with CC  Patient states that he is doing well  Patient does not have any questions or concerns regarding his discharge or instructions.   Patient is not in need of any support or resources at this time.   CHW reminded patient of podiatry follow up appointment.   Patient acknowledged  CHW gave patient contact information for any future needs.    Patient accepts CC: No, patient is not in need of CC support at this time. Patient will be sent Care Coordination introduction letter for future reference.     LANRE Oates   Clinic Care Coordination  [facility]:  [place], [place], [place], [place], and [place]  Phone: [phone]

## 2020-07-21 NOTE — LETTER
2/5/2019    Davion Cordova PA-C  [address] [facility] [facility] [address]    RE: Jayro Elder       Dear Colleague,    I had the pleasure of seeing Jayro MAYURI Elder in the [facility].        HPI and Plan:   This complex 68-year-old gentleman is referred to heart failure clinic after a recent severe acute heart failure episode, likely considerably related to acute renal insufficiency.  He has a history of coronary artery disease with previous MI and stenting and chronic ejection fraction of 35-40%.  He has problems with peripheral wounds from his diabetes.  At the end of 2018 he had some gangrene in his foot and was on long term high-dose antibiotics.  He started to notice some swelling weight gain.  Following discharge she then re-presented with acute heart failure with orthopnea, dyspnea on exertion, severe edema.  He was found to have acute renal insufficiency with creatinine of greater than 3, with a baseline of around 1.  Aggressive diuresis was instituted and he states, and the hospital record intake indicate, he diuresed about 30 pounds.  Since discharge about 10 days ago his home weights are down another 5 pounds or so and are currently at 228 pounds.  During his hospitalization an echo showed no new wall motion abnormalities and troponins with continued moderately decreased ejection fraction.   He did not have any chest discomfort and is not having any now.  He is currently without dyspnea on exertion, orthopnea or PND, or edema.  Denies orthostasis but complains quite a bit of intermittent dizzy episodes where he feels so weak and poor that he has to lie down, sometimes for up to half an hour to feel better.  He is not had chest pain with this is not been aware of rapid heart rate and has not taken his blood pressure.  He had an episode earlier today but after it passed his blood pressure was 140 systolic.  He thinks his episodes are sometimes precipitated by reaching  Peer to peer completed.    Authorization number for CT scan of the abdomen only [id number] this is good for 30 days starting July 21 through August 19, 2020.    [place]   up above into cabinets while he is looking up but is not aware if they occur when he just looks up without reaching upward.  Otherwise unaware of a positional component.  He is quite fatigued with any activity but has been hospitalized for a month essentially prior to this.     He has chronic atrial fibrillation.  There is a history of a CVA likely related to that.  He has been on anticoagulation with apixaban but also was continued on dual antiplatelet therapy prior to this hospitalization.  His last intervention was approximately a year and a half ago involving an obtuse marginal in June 2017 with a 3.0 size stent placed.  His other stents were widely patent at that time.      At the time of his most recent hospitalization with his acute renal insufficiency his insulin doses were changed due to the renal insufficiency, he was taken off of lisinopril but carvedilol and amlodipine were continued.  He was discharged still on 5 mg twice daily of apixaban but no clopidogrel, despite his severe renal insufficiency with a creatinine at discharge of over 3.  He has not had any bleeding episodes.  He does note that this week his glucoses when he checks a markedly higher, and in fact on his labs today is his glucose is about 360.      Exam is detailed below.  In summary:  Blood pressure 102/68.  Pulse 75-80 and irregularly irregular.  Home weight 228 pounds, clinic weight 233 pounds.  He was 268 pounds apparently at admission earlier in January.    Lungs-normal effort and clear  Cardiac-irregular rhythm.  No JVD or HJR.  No murmurs and heave.  Extremities-no edema.  Abdomen-soft nondistended no hepatomegaly    Laboratory studies today are pertinent for:  Creatinine 2.04 with BUN of 37 potassium 3.1.  Sodium is 132 but his glucose is 362 so he essentially has a normal sodium.  It is difficult to know if this creatinine would be better if he were quite so diuresed or if this is just a normal resolution of his acute renal  insufficiency.    Impression/plan    1-recent acute heart failure, likely primarily volume overload from his acute renal insufficiency.  She is completely diuresed by exam and symptoms.  In fact he may be a little bit over diuresed.  I will have him hold his furosemide today and tomorrow, and then use 40 mg daily as needed to keep his home weights between 230-232 pounds.  He will continue sodium restriction.  I will have him follow-up in a week with a basic metabolic panel in core clinic.    2-ischemic cardiomyopathy.  Stable at follow-up echo at recent hospitalization.    3-hypokalemia.  I will start potassium chloride 10 mEq twice daily.  Follow-up basic metabolic panel in 1 week.    4-severe acute renal insufficiency related to high-dose antibiotics and also  Diabetes likely.  This is improving.  However he still in the range where he likely should not be on 5 mg of apixaban twice daily.  For the next several weeks I will have him change to 2.5 mg twice daily until his renal function improves further.    5-hyperglycemia.  Likely with his improving renal function he is going to need better insulin dosing.  He is having trouble getting insulin.  My heart failure nurse is going to call Dr. Lua's office today and get them in contact with him  so they can start adjusting his insulin and getting him the appropriate insulin dosing and type.    6-chronic atrial fibrillation.  Rate controlled.  Otherwise asymptomatic.  On chronic anticoagulation.  The dosing of that should be adjusted as above.  I do not see a role for restarting clopidogrel at this time as long as he is on anticoagulation.    7-coronary artery disease.  No ischemic symptoms currently.  Last stent more than a year and a half ago.  We do not see a role for dual antiplatelet therapy continuation on top of his anticoagulation at this time and I would not restart the clopidogrel which was recently discontinued.  This was discussed with him.    8-dizziness.   Pattern is not typical of orthostasis.  I do think we need to evaluate his vital signs during this.  They have a blood pressure cuff at home and they will take a blood pressure cuff while he is standing sitting and lying during these episodes.  They do not really sound like a bradycardia or tachycardia arrhythmia but we will get his heart rate during these and that will be helpful.  Will reassess further evaluation if they continue after cutting back on his diuretics, and when we see what his vital signs are during.  The only started after his recent rapid aggressive diuresis.    In summary:  -Hold furosemide today and tomorrow, restarted 40 mg daily as needed to keep home weights 230-232 pounds  -Start potassium chloride 10 mg twice daily  -Follow-up in core clinic in 1 week with basic metabolic panel  -Blood pressure and rhythm/heart rate checks during his dizzy episodes  -Change apixaban to 2.5 mg twice daily until renal function improves then resume at 5 mg twice daily  -We will contact Dr. Lua's office to help facilitate better control of his diabetes as his renal function improves  -I renewed gave him appropriate prescriptions including doing his carvedilol.  -When his renal function continues to improve would then restart lisinopril as tolerated by his blood pressure.    Orders Placed This Encounter   Procedures     Basic metabolic panel     Follow-Up with [facility] visit       Orders Placed This Encounter   Medications     carvedilol (COREG) 25 MG tablet     Sig: Take 1 tablet (25 mg) by mouth 2 times daily (with meals)     Dispense:  180 tablet     Refill:  3     apixaban ANTICOAGULANT (ELIQUIS) 2.5 MG tablet     Sig: Take 1 tablet (2.5 mg) by mouth 2 times daily HOLD this medication until instructed to resume by your primary MD     Dispense:  60 tablet     Refill:  1     potassium chloride ER (K-DUR/KLOR-CON M) 10 MEQ CR tablet     Sig: Take 1 tablet (10 mEq) by mouth 2 times daily      Dispense:  60 tablet     Refill:  2       Medications Discontinued During This Encounter   Medication Reason     clopidogrel (PLAVIX) 75 MG tablet      carvedilol (COREG) 25 MG tablet Reorder     apixaban ANTICOAGULANT (ELIQUIS) 5 MG tablet Reorder         Encounter Diagnoses   Name Primary?     Ischemic cardiomyopathy      Acute systolic heart failure (H) Yes     Coronary artery disease involving native coronary artery of native heart with angina pectoris (H)      Chronic atrial fibrillation (H)      History of CVA (cerebrovascular accident)      Acute kidney insufficiency      Long term current use of anticoagulant therapy      Acute on chronic systolic congestive heart failure (H)      Hypertension goal BP (blood pressure) < 140/90      Cerebrovascular accident (CVA) due to embolism of left posterior cerebral artery (H)      Hypokalemia      Dizziness        CURRENT MEDICATIONS:  Current Outpatient Medications   Medication Sig Dispense Refill     amLODIPine (NORVASC) 5 MG tablet Take 0.5 tablets (2.5 mg) by mouth daily 15 tablet 0     apixaban ANTICOAGULANT (ELIQUIS) 2.5 MG tablet Take 1 tablet (2.5 mg) by mouth 2 times daily HOLD this medication until instructed to resume by your primary MD 60 tablet 1     atorvastatin (LIPITOR) 40 MG tablet Take 1 tablet (40 mg) by mouth every evening 30 tablet 0     carvedilol (COREG) 25 MG tablet Take 1 tablet (25 mg) by mouth 2 times daily (with meals) 180 tablet 3     furosemide (LASIX) 80 MG tablet Take 1 tablet (80 mg) by mouth 2 times daily 60 tablet 0     insulin glargine (LANTUS SOLOSTAR PEN) 100 UNIT/ML pen Inject 26 Units Subcutaneous every morning (before breakfast) 7.8 mL 0     insulin pen needle 31G X 6 MM Use  as directed. 100 each prn     isosorbide mononitrate (IMDUR) 30 MG 24 hr tablet Take 1 tablet (30 mg) by mouth daily 90 tablet 0     levothyroxine (SYNTHROID, LEVOTHROID) 137 MCG tablet Take 137 mcg by mouth At Bedtime  90 tablet 3     nitroglycerin  (NITROSTAT) 0.4 MG sublingual tablet Place 1 tablet (0.4 mg) under the tongue every 5 minutes as needed for chest pain 50 tablet 0     pantoprazole (PROTONIX) 40 MG enteric coated tablet Take 1 tablet (40 mg) by mouth every morning (before breakfast) 30 tablet 0     potassium chloride ER (K-DUR/KLOR-CON M) 10 MEQ CR tablet Take 1 tablet (10 mEq) by mouth 2 times daily 60 tablet 2     Cholecalciferol (VITAMIN D3) 2000 units TABS Take 1 tablet by mouth daily       insulin aspart (NOVOLOG FLEXPEN) 100 UNIT/ML pen Inject 8 Units Subcutaneous 3 times daily (with meals) USes about 7-10 units with meal depending (Patient not taking: Reported on 2/5/2019) 7.2 mL 0     lisinopril (PRINIVIL/ZESTRIL) 20 MG tablet Take 1 tablet (20 mg) by mouth 2 times daily HOLD this medication until instructed to resume per primary MD (holding until Cr is <1.0) (Patient not taking: Reported on 2/5/2019) 60 tablet 0     order for DME Equipment being ordered: Other: surgical shoe male XL  Treatment Diagnosis: sp right 2nd toe amputaion (Patient not taking: Reported on 2/5/2019) 1 Device 0     order for DME Equipment being ordered: Walker Wheels () and Walker ()  Treatment Diagnosis: Impaired gait, heel WB bilaterally. 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     Cardiomyopathy (H)      Cellulitis of left lower extremity 3/13/2018     Cerebral infarction (H)     eye sight decreased in peripheral of right side and blurry     Diabetic ulcer of left midfoot associated with type 2 diabetes mellitus, with fat layer exposed (H) 3/13/2018     Essential hypertension, benign 11/13/2002     Generalized osteoarthrosis, unspecified site 11/13/2002      Microalbuminuria 3/13/2018    X1     Myocardial infarction (H)      Neuropathy      Sepsis (H)      Sleep apnea     doesn't use it     Substance abuse (H)      Syncope, unspecified syncope type 3/13/2018     Thyroid disease     takes medicine     Tobacco use disorder 11/13/2002     Type 2 diabetes mellitus with diabetic peripheral angiopathy without gangrene, unspecified long term insulin use status 2005       PAST SURGICAL HISTORY:  Past Surgical History:   Procedure Laterality Date     AMPUTATE TOE(S) Right 1/6/2019    Procedure: Right open second toe partial amputation;  Surgeon: Sabino Garcia DPM;  Location: [facility] OR     AMPUTATE TOE(S) Right 1/8/2019    Procedure: 1.  Revision right second toe amputation with resection of distal half of proximal phalanx with full closure.    2.  Debridement of callus/preulcerative lesion, distal left second toe and plantar left first metatarsophalangeal joint.;  Surgeon: Ryan Bhagat DPM;  Location: [facility] OR     ANGIOGRAM  6/29/05    subtotal occ.mid LAD,SUSAN mid LAD     ANGIOGRAM  7/05    mild CAD,patent stent,no flow-limiting lesions,sev.LV dysf.LAD enlarged     ANGIOGRAM  2/09    Sev.single vessel disease,Mod LV dysf.distal LAD 90%,70-75% mid lad just before prev stent,SUSAN to prox.mid LAD, endeavor to distal LAD     ANGIOGRAM  11/13/13    restenosis, stent LAD     BACK SURGERY      back surgery x3     C NONSPECIFIC PROCEDURE      Laminectomy x 3 - (1983 x 2 & 1990)     C NONSPECIFIC PROCEDURE Bilateral 1998    Bunionectomy     C NONSPECIFIC PROCEDURE  1959    Gingival surgery at age 9     HEART CATH LEFT HEART CATH  06/13/2017    SUSAN to OM3     INCISION AND DRAINAGE TOE, COMBINED Bilateral 9/11/2018    Procedure: COMBINED INCISION AND DRAINAGE TOE;  1) Irrigation and debridement ulcer left great toe  2) Amputation 3rd toe right foot at distal interphalangeal joint level;  Surgeon: Miki Harp MD;  Location:  OR     ORTHOPEDIC SURGERY      bunion surgery  both feet     ORTHOPEDIC SURGERY      left shoulder     SOFT TISSUE SURGERY      debridement of toe numerous time     VASCULAR SURGERY      7 stents in heart       FAMILY HISTORY:  Family History   Problem Relation Age of Onset     Cancer - colorectal Sister      Thyroid Disease Brother      Cardiovascular Brother      Diabetes Brother      Cancer Father         tumor in chest and throat     Arthritis Mother      Thyroid Disease Mother      Diabetes Mother      Glaucoma No family hx of      Macular Degeneration No family hx of        SOCIAL HISTORY:  Social History     Socioeconomic History     Marital status:      Spouse name: None     Number of children: None     Years of education: None     Highest education level: None   Social Needs     Financial resource strain: None     Food insecurity - worry: None     Food insecurity - inability: None     Transportation needs - medical: None     Transportation needs - non-medical: None   Occupational History     None   Tobacco Use     Smoking status: Former Smoker     Packs/day: 1.00     Years: 25.00     Pack years: 25.00     Types: Cigarettes     Last attempt to quit: 2005     Years since quittin.5     Smokeless tobacco: Never Used   Substance and Sexual Activity     Alcohol use: Yes     Alcohol/week: 2.4 oz     Types: 4 Shots of liquor per week     Comment: almost every day     Drug use: No     Sexual activity: Yes     Partners: Female   Other Topics Concern     Parent/sibling w/ CABG, MI or angioplasty before 65F 55M? Yes      Service Not Asked     Blood Transfusions Not Asked     Caffeine Concern No     Comment: 3 cups daily     Occupational Exposure Not Asked     Hobby Hazards Not Asked     Sleep Concern Not Asked     Stress Concern Not Asked     Weight Concern Not Asked     Special Diet Yes     Comment: watching carb intake     Back Care Not Asked     Exercise No     Comment: some walking     Bike Helmet Not Asked     Seat Belt Not Asked      "Self-Exams Not Asked   Social History Narrative     None       Review of Systems:  Skin:  Negative       Eyes:  Positive for glasses    ENT:  Negative      Respiratory:  Negative       Cardiovascular:    dizziness;heaviness;fatigue;lightheadedness    Gastroenterology: Negative      Genitourinary:  Negative      Musculoskeletal:  Positive for arthritis    Neurologic:  Positive for headaches constant   Psychiatric:  Negative      Heme/Lymph/Imm:  Positive for easy bruising    Endocrine:  Positive for diabetes      Physical Exam:  Vitals: /68 (BP Location: Right arm, Patient Position: Sitting, Cuff Size: Adult Regular)   Pulse 80   Ht 1.93 m (6' 3.98\")   Wt 105.7 kg (233 lb 1.6 oz)   SpO2 98%   BMI 28.39 kg/m       Constitutional:  cooperative, alert and oriented, well developed, well nourished, in no acute distress        Skin:  warm and dry to the touch          Head:  normocephalic        Eyes:  pupils equal and round;sclera white;EOMS intact        Lymph:      ENT:  no pallor or cyanosis        Neck:  JVP normal;no carotid bruit(no HJR)        Respiratory:  clear to auscultation;normal symmetry;normal respiratory excursion         Cardiac: no murmurs, gallops or rubs detected;normal S1 and S2 irregularly irregular rhythm           mildly irregular  not assessed this visit                                        GI:  abdomen soft;no masses;non-tender;no HSM obese      Extremities and Muscular Skeletal:  no edema              Neurological:  no gross motor deficits        Psych:  affect appropriate, oriented to time, person and place        Thank you for allowing me to participate in the care of your patient.    Sincerely,     Justin Tomlin MD     [facility]    "

## 2020-07-21 NOTE — TELEPHONE ENCOUNTER
Called the pt - he said he talked to someone yesterday - see pt outreach of 7/20/2020.    He said he does have a f/u appt scheduled with podiatry. He is taking his antibiotic.

## 2020-07-22 LAB
BACTERIA SPEC CULT: NO GROWTH
BACTERIA SPEC CULT: NO GROWTH
SPECIMEN SOURCE: NORMAL
SPECIMEN SOURCE: NORMAL

## 2020-07-30 ENCOUNTER — OFFICE VISIT (OUTPATIENT)
Dept: PODIATRY | Facility: CLINIC | Age: 70
End: 2020-07-30
Payer: COMMERCIAL

## 2020-07-30 VITALS
HEIGHT: 76 IN | DIASTOLIC BLOOD PRESSURE: 76 MMHG | BODY MASS INDEX: 31.54 KG/M2 | SYSTOLIC BLOOD PRESSURE: 108 MMHG | WEIGHT: 259 LBS

## 2020-07-30 DIAGNOSIS — L97.522 DIABETIC ULCER OF OTHER PART OF LEFT FOOT ASSOCIATED WITH TYPE 2 DIABETES MELLITUS, WITH FAT LAYER EXPOSED (H): ICD-10-CM

## 2020-07-30 DIAGNOSIS — Z89.421 H/O AMPUTATION OF LESSER TOE, RIGHT (H): ICD-10-CM

## 2020-07-30 DIAGNOSIS — E11.42 DIABETIC POLYNEUROPATHY ASSOCIATED WITH TYPE 2 DIABETES MELLITUS (H): Primary | ICD-10-CM

## 2020-07-30 DIAGNOSIS — E11.621 DIABETIC ULCER OF OTHER PART OF LEFT FOOT ASSOCIATED WITH TYPE 2 DIABETES MELLITUS, WITH FAT LAYER EXPOSED (H): ICD-10-CM

## 2020-07-30 DIAGNOSIS — Z89.422 H/O AMPUTATION OF LESSER TOE, LEFT (H): ICD-10-CM

## 2020-07-30 PROCEDURE — 11042 DBRDMT SUBQ TIS 1ST 20SQCM/<: CPT | Performed by: PODIATRIST

## 2020-07-30 PROCEDURE — 99213 OFFICE O/P EST LOW 20 MIN: CPT | Mod: 25 | Performed by: PODIATRIST

## 2020-07-30 ASSESSMENT — MIFFLIN-ST. JEOR: SCORE: 2036.32

## 2020-07-30 NOTE — PROGRESS NOTES
"Podiatry / Foot and Ankle Surgery Progress Note    July 30, 2020    Subject: Patient was seen for follow up on ulcer follow-up on the left foot.  Patient denies fever, nausea, vomiting.  He has been doing Iodosorb dressing changes.  Notes that he has been wearing his offloading shoe.  Wondering what else can be done to heal the foot.    Objective:  Vitals: /76   Ht 1.93 m (6' 4\")   Wt 117.5 kg (259 lb)   BMI 31.53 kg/m    BMI= Body mass index is 31.53 kg/m .    A1C: 10.4 (4/2020)    General:  Patient is alert and orientated.  NAD  Vascular:  DP and PT pulses are palpable.  No varicosities noted  CFT's < 3secs.  Skin temp is normal     Neuro:  Light and gross touch sensation absent to feet.      Derm: Full-thickness ulceration to the plantar aspect of the left first metatarsal head upon debridement.  This measures approximately 1-1/2 cm x 2 cm x 0.2 cm after debridement.  No surrounding erythema, purulent drainage noted.        Musculoskeletal: multiple previous toe amputations right foot.  Left second toe amputated previously.     Assessment:     Diabetic polyneuropathy associated with type 2 diabetes mellitus (H)  Pre-ulcerative calluses  Hammer toes of both feet  H/O amputation of lesser toe, right (H)     Plan: At this time the ulcer was debrided. He has been using an emery board and wearing his offloading inserts all the time.    We will have him continue wearing his offloading shoe at all times to take pressure off of the area.  We also discussed better sugar control as his last A1c was quite high.  Talked about increasing his protein intake to try to help with healing as well as taking vitamin supplements of vitamin D vitamin B and folate.  We will order ABIs to assess blood flow to the foot to make sure that that is adequate for healing.  We will call him with the results.  We will have him follow-up in 1 month. He was told to call with further questions or concerns.    Procedure: After verbal " consent, excisional debridement was performed on ulcer.  #15 blade was used to debride ulcer down to and including subcutaneous tissue. Bleeding controlled with light pressure.   No drainage noted.  No anesthesia was used due to neuropathy. Dry dressing applied to foot.  Patient tolerated procedure well.      Татьяна Mckeon DPM, Podiatry/Foot and Ankle Surgery    Weight management plan: Patient was referred to their PCP to discuss a diet and exercise plan.

## 2020-07-30 NOTE — LETTER
"    7/30/2020         RE: Jayro Elder  [address] [place] [address]        Dear Colleague,    Thank you for referring your patient, Jayro Elder, to the [facility]. Please see a copy of my visit note below.    Podiatry / Foot and Ankle Surgery Progress Note    July 30, 2020    Subject: Patient was seen for follow up on ulcer follow-up on the left foot.  Patient denies fever, nausea, vomiting.  He has been doing Iodosorb dressing changes.  Notes that he has been wearing his offloading shoe.  Wondering what else can be done to heal the foot.    Objective:  Vitals: /76   Ht 1.93 m (6' 4\")   Wt 117.5 kg (259 lb)   BMI 31.53 kg/m    BMI= Body mass index is 31.53 kg/m .    A1C: 10.4 (4/2020)    General:  Patient is alert and orientated.  NAD  Vascular:  DP and PT pulses are palpable.  No varicosities noted  CFT's < 3secs.  Skin temp is normal     Neuro:  Light and gross touch sensation absent to feet.      Derm: Full-thickness ulceration to the plantar aspect of the left first metatarsal head upon debridement.  This measures approximately 1-1/2 cm x 2 cm x 0.2 cm after debridement.  No surrounding erythema, purulent drainage noted.        Musculoskeletal: multiple previous toe amputations right foot.  Left second toe amputated previously.     Assessment:     Diabetic polyneuropathy associated with type 2 diabetes mellitus (H)  Pre-ulcerative calluses  Hammer toes of both feet  H/O amputation of lesser toe, right (H)     Plan: At this time the ulcer was debrided. He has been using an emery board and wearing his offloading inserts all the time.    We will have him continue wearing his offloading shoe at all times to take pressure off of the area.  We also discussed better sugar control as his last A1c was quite high.  Talked about increasing his protein intake to try to help with healing as well as taking vitamin supplements of vitamin D vitamin B and folate.  We will order " ABIs to assess blood flow to the foot to make sure that that is adequate for healing.  We will call him with the results.  We will have him follow-up in 1 month. He was told to call with further questions or concerns.    Procedure: After verbal consent, excisional debridement was performed on ulcer.  #15 blade was used to debride ulcer down to and including subcutaneous tissue. Bleeding controlled with light pressure.   No drainage noted.  No anesthesia was used due to neuropathy. Dry dressing applied to foot.  Patient tolerated procedure well.      Татьяна Mckeon DPM, Podiatry/Foot and Ankle Surgery    Weight management plan: Patient was referred to their PCP to discuss a diet and exercise plan.      Again, thank you for allowing me to participate in the care of your patient.        Sincerely,        Татьяна Mckeon DPM, Podiatry/Foot and Ankle Surgery

## 2020-08-13 ENCOUNTER — TELEPHONE (OUTPATIENT)
Dept: FAMILY MEDICINE | Facility: CLINIC | Age: 70
End: 2020-08-13

## 2020-08-13 ENCOUNTER — TRANSFERRED RECORDS (OUTPATIENT)
Dept: HEALTH INFORMATION MANAGEMENT | Facility: CLINIC | Age: 70
End: 2020-08-13

## 2020-08-13 DIAGNOSIS — I25.119 CORONARY ARTERY DISEASE INVOLVING NATIVE CORONARY ARTERY OF NATIVE HEART WITH ANGINA PECTORIS (H): ICD-10-CM

## 2020-08-13 DIAGNOSIS — I50.22 CHRONIC SYSTOLIC CONGESTIVE HEART FAILURE (H): ICD-10-CM

## 2020-08-13 LAB
ANION GAP SERPL CALCULATED.3IONS-SCNC: 4 MMOL/L (ref 3–14)
BUN SERPL-MCNC: 18 MG/DL (ref 7–30)
CALCIUM SERPL-MCNC: 8.9 MG/DL (ref 8.5–10.1)
CHLORIDE SERPL-SCNC: 108 MMOL/L (ref 94–109)
CHOLEST SERPL-MCNC: 107 MG/DL
CO2 SERPL-SCNC: 27 MMOL/L (ref 20–32)
CREAT SERPL-MCNC: 1.13 MG/DL (ref 0.66–1.25)
GFR SERPL CREATININE-BSD FRML MDRD: 65 ML/MIN/{1.73_M2}
GLUCOSE SERPL-MCNC: 157 MG/DL (ref 70–99)
HDLC SERPL-MCNC: 45 MG/DL
LDLC SERPL CALC-MCNC: 53 MG/DL
NONHDLC SERPL-MCNC: 62 MG/DL
POTASSIUM SERPL-SCNC: 3.9 MMOL/L (ref 3.4–5.3)
SODIUM SERPL-SCNC: 139 MMOL/L (ref 133–144)
TRIGL SERPL-MCNC: 44 MG/DL

## 2020-08-13 PROCEDURE — 36415 COLL VENOUS BLD VENIPUNCTURE: CPT | Performed by: INTERNAL MEDICINE

## 2020-08-13 PROCEDURE — 80048 BASIC METABOLIC PNL TOTAL CA: CPT | Performed by: PHYSICIAN ASSISTANT

## 2020-08-13 PROCEDURE — 80061 LIPID PANEL: CPT | Performed by: PHYSICIAN ASSISTANT

## 2020-08-13 NOTE — TELEPHONE ENCOUNTER
Reason for call:  Other   Patient called regarding (reason for call): call back  Additional comments: Patient stated that he had a procedure done by podiatry not that long ago and was told not to take IB profin. Patient stated that he's been taking gabapentin but it's not helping the pain. Patient also wanted to discuss his headaches and dizziness that he's been experiencing.     Phone number to reach patient:  Home number on file [phone] (home)    Best Time:  any    Can we leave a detailed message on this number?  YES    Travel screening: Not Applicable     Dulce Fuchs,

## 2020-08-13 NOTE — TELEPHONE ENCOUNTER
Calling patient-     I have been feeling crummy- I have a bad headache.- Can not do anything ., I feel really nauseous sometimes- thinking it is because of his foot pain      Chest pain- not there all the time only when I I am bending down and I have been feeling like this for about a month     My foot pain is not feeling good and gabapentin is not helping and can no longer tolerate the pain- wanting to see Ian. Appt made.     Laya Yo RN on 8/13/2020 at 2:53 PM

## 2020-08-14 NOTE — PROGRESS NOTES
"Subjective     Jayro Elder is a 70 year old male who presents to clinic today for the following health issues:    HPI     Musculoskeletal problem/pain  Onset/Duration: ***  Description  Location: {.:704618} - {.:218293}  Joint Swelling: {.:384838::\"no\"}  Redness: {.:406150::\"no\"}  Pain: {.:674323::\"no\"}  Warmth: {.:591290::\"no\"}  Intensity:  {mild,moderate,severe:605390}  Progression of Symptoms:  {.:299192}  Accompanying signs and symptoms:   Fevers: {.:864646::\"no\"}  Numbness/tingling/weakness: {.:553248::\"no\"}  History  Trauma to the area: {.:077822::\"no\"}  Recent illness:  {.:520623::\"no\"}  Previous similar problem: {.:862462::\"no\"}  Previous evaluation:  {.:188947::\"no\"}  Precipitating or alleviating factors:  Aggravating factors include: {AGGRAVATING MS FACTORS CHRONIC PROB:812011::\"none\"}  Therapies tried and outcome: {MS RELIEF ITEMS:619739::\"nothing\"}  {additonal problems for provider to add (Optional):420360}    {HIST REVIEW/ LINKS 2 (Optional):066897}    Reviewed and updated as needed this visit by Provider         Review of Systems   {ROS COMP (Optional):289207}      Objective    There were no vitals taken for this visit.  There is no height or weight on file to calculate BMI.  Physical Exam   {Exam List (Optional):493615}    {Diagnostic Test Results (Optional):299307::\"Diagnostic Test Results:\",\"Labs reviewed in Epic\"}        {PROVIDER CHARTING PREFERENCE:615548}  "

## 2020-08-17 ENCOUNTER — VIRTUAL VISIT (OUTPATIENT)
Dept: CARDIOLOGY | Facility: CLINIC | Age: 70
End: 2020-08-17
Attending: PHYSICIAN ASSISTANT
Payer: COMMERCIAL

## 2020-08-17 VITALS
SYSTOLIC BLOOD PRESSURE: 117 MMHG | BODY MASS INDEX: 30.81 KG/M2 | HEART RATE: 76 BPM | HEIGHT: 76 IN | DIASTOLIC BLOOD PRESSURE: 78 MMHG | WEIGHT: 253 LBS

## 2020-08-17 DIAGNOSIS — I25.119 CORONARY ARTERY DISEASE INVOLVING NATIVE CORONARY ARTERY OF NATIVE HEART WITH ANGINA PECTORIS (H): Primary | ICD-10-CM

## 2020-08-17 DIAGNOSIS — I50.22 CHRONIC SYSTOLIC CONGESTIVE HEART FAILURE (H): ICD-10-CM

## 2020-08-17 DIAGNOSIS — I95.1 ORTHOSTATIC HYPOTENSION: ICD-10-CM

## 2020-08-17 DIAGNOSIS — I48.20 CHRONIC ATRIAL FIBRILLATION (H): ICD-10-CM

## 2020-08-17 DIAGNOSIS — I25.5 ISCHEMIC CARDIOMYOPATHY: ICD-10-CM

## 2020-08-17 DIAGNOSIS — Z79.01 LONG TERM CURRENT USE OF ANTICOAGULANT THERAPY: ICD-10-CM

## 2020-08-17 PROCEDURE — 99213 OFFICE O/P EST LOW 20 MIN: CPT | Mod: 95 | Performed by: INTERNAL MEDICINE

## 2020-08-17 ASSESSMENT — MIFFLIN-ST. JEOR: SCORE: 2009.1

## 2020-08-17 NOTE — PROGRESS NOTES
"Jayro Elder is a 70 year old male who is being evaluated via a billable telephone visit.      The patient has been notified of following:     \"This telephone visit will be conducted via a call between you and your physician/provider. We have found that certain health care needs can be provided without the need for a physical exam.  This service lets us provide the care you need with a short phone conversation.  If a prescription is necessary we can send it directly to your pharmacy.  If lab work is needed we can place an order for that and you can then stop by our lab to have the test done at a later time.    Telephone visits are billed at different rates depending on your insurance coverage. During this emergency period, for some insurers they may be billed the same as an in-person visit.  Please reach out to your insurance provider with any questions.    If during the course of the call the physician/provider feels a telephone visit is not appropriate, you will not be charged for this service.\"    Patient has given verbal consent for Telephone visit?  Yes    What phone number would you like to be contacted at? [phone]    How would you like to obtain your AVS? Mail a copy    Bp:117/78  Pulse:76  Wt:253.0lb    Review Of Systems  Skin: NEGATIVE  Eyes:Ears/Nose/Throat: Glasses  Respiratory: NEGATIVE  Cardiovascular:Dizziness  Gastrointestinal: NEGATIVE  Genitourinary:NEGATIVE   Musculoskeletal: Arthritis  Neurologic: muscle weakness in both arms  Psychiatric: NEGATIVE  Hematologic/Lymphatic/Immunologic: NEGATIVE  Endocrine: Diabetic    Mandy BROWN     HPI and Plan:   Mr. Elder is a 70 year old male seen via tele-visit due to pandemic restrictions for follow-up of a complex cardiovascular history including multiple MIs, multivessel stenting, ischemic cardiomyopathy, chronic systolic heart failure, chronic atrial fibrillation, and now more recently problematic orthostatic hypotension.    Pertinent " "other PMHx/comorbidities include poorly controlled DM2, hypothyroidism, hypertension, CVA, untreated MARISOL, and chronic renal dysfunction.    He also struggles with peripheral wounds from his diabetes. In Jan 2019 he was admitted for osteomyelitis and his stay was complicated by ABNER with IV antibiotics and his long term clopidogrel was stopped at that time. He presented back shortly after, with weight gain and ANDREWS. He was again hospitalized and treated for a CHF exacerbation, and was diuresed ~30 pounds. Echo showed no new WMAs and his EF remained in the 35-40% range. He was again hospitalized in March 2019 for right foot cellulitis and right toe osteomyelitis. He got IV abx and underwent partial toe amputation as well as I+D of his left foot ulcer. During that stay, he did not have an exacerbation of his heart failure, and his atrial fibrillation remained under good control.      In early July 2019 he was admitted again for evaluation of chest pain. He underwent SUSAN x 3, overlapping, to the recently thrombotically occluded RCA. Echo showed EF remained 35-40% without significant changes. He returned a few weeks later with atypical chest pain, and medical management was recommended as repeat stress test did not show any new ischemia. I have since been following him periodically in [facility], but we had been spacing out visits at his request due to to finances/transportation. I saw him last formally in Nov 2019 at which time he felt he was at his baseline and no changes were made.     More recently, in March of 2020 he called to report low BPs at home with orthostasis (as low as 80/60). We have had a few virtual visits since that time for medication adjustments. I temporarily held most of his cardiac medications, and shortly after he required a few extra doses of Lasix due to some leg swelling. As he endorsed some new chest tightness and \"just not feeling good,\" we performed a stress test in mid April which showed EF " 37% with akinesis of mid to distal anterior, anteroseptal and apical walls. There was also a medium medium sized area of a severe degree of infarction in the mid to distal anterior, apical and anteroseptal segment(s) of the left ventricle.  There was no ischemia is identified, and notably, this was not significant changed when compared to July of 2019. Ultimately, he was again found to have a gangrene toe and underwent an amputation around the same time.      Cardiac standpoint he states his current major issues are continued intermittent symptomatic orthostasis.  He thinks this is his present about 50% of the time but sometimes he will go up for a few days or even a week without much symptoms.  When he gets his symptoms he notes his blood pressure drops and to around 80 systolic and does not come up so he asked to sit or lie down for a while.  Sometimes this also happens when he is up and around and he gets diaphoretic weak and tired and some chest discomfort.  This pattern however is been stable since his stress test of April of this year.  No new chest discomfort.  Denies orthopnea, PND, new edema but has some chronic edema.  Continues to have issues with his diabetes, neuropathy, and history of osteomyelitis.  He was recently hospitalized from warmth osteomyelitis and toe amputation and states he is been told to stay off of his feet as much as possible currently.  He denies syncope or near syncope, palpitations, falls, bleeding.  He continues on anticoagulation and Plavix given his extensive recent right coronary stenting a year ago with 3 overlapping stents, there is a remote history of circumflex stenting which was widely patent that angiogram last year, and extent of LAD stenting which is also widely patent, although most of his anterior wall is akinetic.    Labs done this week are reviewed with him.  Creatinine is stable at 1.13 with GFR below 65.  Electrolytes normal.  LDL controlled at 51.  Hemoglobin in  the 11's.            Assessment/Plan:     1. Coronary artery disease.              --Long cardiac hx including MI in 2005 with proximal LAD stenting, repeat LAD stenting in 2009 and distal LAD stenting in 2013. In June 2017 he had stent placement to an obtuse marginal. In July 2019, he was found to have progression of his disease and underwent SUSAN x 3 to the RCA.  Clinically I do not get a history suggesting recurrent ischemia and he has a recent stress study without stress-induced ischemia, but demonstrating his old anterior MI.  Therefore we will continue his current medical regimen as long as his orthostasis and blood pressure are manageable.    2-symptomatic orthostatic hypotension-which is worsening over time.  I suspect there is an element of autonomic insufficiency from his difficult diabetes control.  He is going to try to keep close track of how often he is having these symptoms and to what degree.  We may have to titrate further off some of his drugs and affect his blood pressure.  However when they were completely temporarily stopped he got hypertensive.  However I would not preclude titrating back on some of his carvedilol.  He did not think it helped previously to stop his isosorbide monitor nitrate although it did help to stop his lisinopril.  We will continue to have to follow this carefully.      2.  Chronic systolic heart failure, known ischemic cardiomyopathy.              --Known chronic EF 35-40%, which remained unchanged per last echo July 2019 when he returned with angina, requiring repeat intervention.  Currently without symptoms of acute or significant volume overload.  Continue his current diuresis.  Renal function stable.  Electrolytes stable.                   3. Chronic atrial fibrillation.              --Appears rate controlled, self reports heart rates are in the 70s. Holter performed in April showed average HR 88 with chronic afib.               --Continue Eliquis 5mg BID.  No bleeding  issues.    4-status post recent coronary stenting, most recent extensive right coronary stenting a year ago.  Now a year out but with multiple overlapping stents, and tolerating, I would like to continue on clopidogrel as well as his anticoagulation.     Current regimen will be continued and he will track his symptoms status.  I will have him follow-up in 3 months.    Orders Placed This Encounter   Procedures     Basic metabolic panel     Follow-Up with [facility] visit       No orders of the defined types were placed in this encounter.      There are no discontinued medications.      Encounter Diagnoses   Name Primary?     Coronary artery disease involving native coronary artery of native heart with angina pectoris (H) Yes     Chronic systolic congestive heart failure (H)      Orthostatic hypotension      Ischemic cardiomyopathy      Chronic atrial fibrillation (H)      Long term current use of anticoagulant therapy        CURRENT MEDICATIONS:  Current Outpatient Medications   Medication Sig Dispense Refill     apixaban ANTICOAGULANT (ELIQUIS) 5 MG tablet Take 1 tablet (5 mg) by mouth 2 times daily       ASPIRIN NOT PRESCRIBED (INTENTIONAL) Antiplatelet medication not prescribed intentionally due to Current anticoagulant therapy (warfarin/enoxaparin)       atorvastatin (LIPITOR) 40 MG tablet Take 1 tablet (40 mg) by mouth every evening 90 tablet 3     Cadexomer Iodine, topical, 0.9% (IODOSORB) 0.9 % GEL gel Apply to ulcer on left foot every other day with gauze dressing. 40 g 0     carvedilol (COREG) 25 MG tablet Take 25 mg by mouth 2 times daily (with meals)        clopidogrel (PLAVIX) 75 MG tablet Take 1 tablet (75 mg) by mouth daily 90 tablet 3     furosemide (LASIX) 40 MG tablet Take 1 tablet (40 mg) by mouth daily 90 tablet 3     gabapentin (NEURONTIN) 100 MG capsule Take 3 capsules (300 mg) by mouth At Bedtime 270 capsule 2     insulin aspart (NOVOLOG FLEXPEN) 100 UNIT/ML pen Inject 8 Units  Subcutaneous daily (with lunch)       insulin aspart (NOVOLOG FLEXPEN) 100 UNIT/ML pen Inject 10 Units Subcutaneous daily (with dinner)       insulin aspart (NOVOLOG FLEXPEN) 100 UNIT/ML pen Inject Subcutaneous 3 times daily (with meals) Sliding Scale  Do not give sliding scale insulin if Pre-Meal BG less than 140.   For Pre-Meal:   - 200 give 2 units.    - 250 give 4 units.    - 300 give 6 units.    - 350 give 8 units.    - 400 give 10 units.       insulin aspart (NOVOLOG PEN) 100 UNIT/ML pen Inject 8 Units Subcutaneous daily (with breakfast)        insulin glargine (LANTUS PEN) 100 UNIT/ML pen Inject 42 Units Subcutaneous every morning 30 mL 1     insulin pen needle 31G X 6 MM Use  as directed. 100 each prn     isosorbide mononitrate (IMDUR) 60 MG 24 hr tablet Take 1 tablet (60 mg) by mouth daily 90 tablet 0     levothyroxine (SYNTHROID, LEVOTHROID) 137 MCG tablet Take 137 mcg by mouth every evening  90 tablet 3     nitroglycerin (NITROSTAT) 0.4 MG sublingual tablet Place 1 tablet (0.4 mg) under the tongue every 5 minutes as needed for chest pain 50 tablet 0     order for DME Equipment being ordered: size 12 surgical shoe 1 Device 0     order for DME Equipment being ordered: Walker Wheels () and Walker ()  Treatment Diagnosis: Impaired gait, heel WB bilaterally. 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     Cardiomyopathy (H)      Cellulitis of left lower extremity 3/13/2018     Cerebral infarction (H)     eye  sight decreased in peripheral of right side and blurry     Diabetic ulcer of left midfoot associated with type 2 diabetes mellitus, with fat layer exposed (H) 3/13/2018     Essential hypertension, benign 11/13/2002     Generalized osteoarthrosis, unspecified site 11/13/2002     Microalbuminuria 3/13/2018    X1     Myocardial infarction (H)      Neuropathy      Sepsis (H)      Sleep apnea     doesn't use it     Substance abuse (H)      Syncope, unspecified syncope type 3/13/2018     Thyroid disease     takes medicine     Tobacco use disorder 11/13/2002     Type 2 diabetes mellitus with diabetic peripheral angiopathy without gangrene, unspecified long term insulin use status 2005       PAST SURGICAL HISTORY:  Past Surgical History:   Procedure Laterality Date     AMPUTATE TOE(S) Right 1/6/2019    Procedure: Right open second toe partial amputation;  Surgeon: Sabino Garcia DPM;  Location: [facility]     AMPUTATE TOE(S) Right 1/8/2019    Procedure: 1.  Revision right second toe amputation with resection of distal half of proximal phalanx with full closure.    2.  Debridement of callus/preulcerative lesion, distal left second toe and plantar left first metatarsophalangeal joint.;  Surgeon: Ryan Bhagat DPM;  Location: [facility] OR     AMPUTATE TOE(S) Right 3/12/2019    Procedure: Partial right fourth toe amputation.;  Surgeon: Ryan Bhagat DPM;  Location: [facility]     AMPUTATE TOE(S) Right 5/6/2019    Procedure: Right partial third toe amputation;  Surgeon: Татьяна Mckeon DPM, Podiatry/Foot and Ankle Surgery;  Location: [facility] OR     AMPUTATE TOE(S) Left 4/18/2020    Procedure: LEFT SECOND TOE AMPUTATION;  Surgeon: Ryan Bhagat DPM;  Location: [facility]     ANGIOGRAM  6/29/05    subtotal occ.mid LAD,SUSAN mid LAD     ANGIOGRAM  7/05    mild CAD,patent stent,no flow-limiting lesions,sev.LV dysf.LAD enlarged     ANGIOGRAM  2/09    Sev.single vessel disease,Mod LV dysf.distal LAD 90%,70-75% mid lad just before prev  stent,SUSAN to prox.mid LAD, endeavor to distal LAD     ANGIOGRAM  11/13/13    restenosis, stent LAD     BACK SURGERY      back surgery x3     C NONSPECIFIC PROCEDURE      Laminectomy x 3 - (1983 x 2 & 1990)     C NONSPECIFIC PROCEDURE Bilateral 1998    Bunionectomy     C NONSPECIFIC PROCEDURE  1959    Gingival surgery at age 9     CV CORONARY ANGIOGRAM N/A 7/8/2019    Procedure: Coronary Angiogram;  Surgeon: Jose Alfredo Crockett MD;  [facility]     CV LEFT HEART CATH N/A 7/8/2019    Procedure: Left Heart Cath;  Surgeon: Jose Alfredo Crockett MD;  Location:  [facility]     CV PCI ASPIRATION THROMECTOMY N/A 7/8/2019    Procedure: PCI Aspiration Thrombectomy;  Surgeon: Jose Alfredo Crockett MD;  Location:  [facility]     CV PCI STENT DRUG ELUTING N/A 7/8/2019    Procedure: PCI Stent Drug Eluting;  Surgeon: Jose Alfredo Crockett MD;  Location:  [facility]     HEART CATH LEFT HEART CATH  06/13/2017    SUSAN to OM3     INCISION AND DRAINAGE TOE, COMBINED Bilateral 9/11/2018    Procedure: COMBINED INCISION AND DRAINAGE TOE;  1) Irrigation and debridement ulcer left great toe  2) Amputation 3rd toe right foot at distal interphalangeal joint level;  Surgeon: Miki Harp MD;  Location:  OR     IRRIGATION AND DEBRIDEMENT FOOT, COMBINED Left 3/12/2019    Procedure: Debridement of left foot ulcer sub first MPJ 2 x 2.5 cm down to and including subcutaneous tissue, callus debridement for preulcerative lesion, left second toe.;  Surgeon: Ryan Bhagat DPM;  Location:  OR     ORTHOPEDIC SURGERY      bunion surgery both feet     ORTHOPEDIC SURGERY      left shoulder     SOFT TISSUE SURGERY      debridement of toe numerous time     VASCULAR SURGERY      7 stents in heart       FAMILY HISTORY:  Family History   Problem Relation Age of Onset     Cancer - colorectal Sister      Thyroid Disease Brother      Cardiovascular Brother      Diabetes Brother      Cancer  Father         tumor in chest and throat     Arthritis Mother      Thyroid Disease Mother      Diabetes Mother      Glaucoma No family hx of      Macular Degeneration No family hx of        SOCIAL HISTORY:  Social History     Socioeconomic History     Marital status:      Spouse name: None     Number of children: None     Years of education: None     Highest education level: None   Occupational History     None   Social Needs     Financial resource strain: None     Food insecurity     Worry: None     Inability: None     Transportation needs     Medical: None     Non-medical: None   Tobacco Use     Smoking status: Former Smoker     Packs/day: 1.00     Years: 25.00     Pack years: 25.00     Types: Cigarettes     Start date: 1980     Last attempt to quit: 7/25/2005     Years since quitting: 15.0     Smokeless tobacco: Never Used   Substance and Sexual Activity     Alcohol use: Yes     Alcohol/week: 4.0 standard drinks     Types: 4 Shots of liquor per week     Frequency: Never     Comment: OCCASSIONALLY      Drug use: No     Sexual activity: Yes     Partners: Female   Lifestyle     Physical activity     Days per week: None     Minutes per session: None     Stress: None   Relationships     Social connections     Talks on phone: None     Gets together: None     Attends Sabianist service: None     Active member of club or organization: None     Attends meetings of clubs or organizations: None     Relationship status: None     Intimate partner violence     Fear of current or ex partner: None     Emotionally abused: None     Physically abused: None     Forced sexual activity: None   Other Topics Concern     Parent/sibling w/ CABG, MI or angioplasty before 65F 55M? Yes      Service Not Asked     Blood Transfusions Not Asked     Caffeine Concern No     Comment: 3 cups daily     Occupational Exposure Not Asked     Hobby Hazards Not Asked     Sleep Concern Not Asked     Stress Concern Not Asked     Weight Concern  Not Asked     Special Diet Yes     Comment: watching carb intake     Back Care Not Asked     Exercise No     Comment: some walking     Bike Helmet Not Asked     Seat Belt Not Asked     Self-Exams Not Asked   Social History Narrative     None         Physical exam markedly limited by necessity of tele-visit due to pandemic.  States his weight is stable  Talks in long sentences without cough wheeze or other shortness of breath  States minimal ankle edema and no open sores  Alert and oriented, fluent speech, normal mentation  Grossly normal affect and no pressured or tardive speech    CC  Davion Cordova PA-C  [id number] [facility] [place] [address]      Phone call duration: 16 minutes    Justin Tomlin MD

## 2020-08-17 NOTE — LETTER
8/17/2020    Davion Cordova PA-C  [address]  [facility] [address]    RE: Jayro Elder       Dear Colleague,    I had the pleasure of seeing Jayro Elder in the [facility].    Jayro Elder is a 70 year old male who is being evaluated via a billable telephone visit.        HPI and Plan:   Mr. Elder is a 70 year old male seen via tele-visit due to pandemic restrictions for follow-up of a complex cardiovascular history including multiple MIs, multivessel stenting, ischemic cardiomyopathy, chronic systolic heart failure, chronic atrial fibrillation, and now more recently problematic orthostatic hypotension.    Pertinent other PMHx/comorbidities include poorly controlled DM2, hypothyroidism, hypertension, CVA, untreated MARISOL, and chronic renal dysfunction.    He also struggles with peripheral wounds from his diabetes. In Jan 2019 he was admitted for osteomyelitis and his stay was complicated by ABNER with IV antibiotics and his long term clopidogrel was stopped at that time. He presented back shortly after, with weight gain and ANDREWS. He was again hospitalized and treated for a CHF exacerbation, and was diuresed ~30 pounds. Echo showed no new WMAs and his EF remained in the 35-40% range. He was again hospitalized in March 2019 for right foot cellulitis and right toe osteomyelitis. He got IV abx and underwent partial toe amputation as well as I+D of his left foot ulcer. During that stay, he did not have an exacerbation of his heart failure, and his atrial fibrillation remained under good control.      In early July 2019 he was admitted again for evaluation of chest pain. He underwent SUSAN x 3, overlapping, to the recently thrombotically occluded RCA. Echo showed EF remained 35-40% without significant changes. He returned a few weeks later with atypical chest pain, and medical management was recommended as repeat stress test did not show any new ischemia. I have since  "been following him periodically in [facility], but we had been spacing out visits at his request due to to finances/transportation. I saw him last formally in Nov 2019 at which time he felt he was at his baseline and no changes were made.     More recently, in March of 2020 he called to report low BPs at home with orthostasis (as low as 80/60). We have had a few virtual visits since that time for medication adjustments. I temporarily held most of his cardiac medications, and shortly after he required a few extra doses of Lasix due to some leg swelling. As he endorsed some new chest tightness and \"just not feeling good,\" we performed a stress test in mid April which showed EF 37% with akinesis of mid to distal anterior, anteroseptal and apical walls. There was also a medium medium sized area of a severe degree of infarction in the mid to distal anterior, apical and anteroseptal segment(s) of the left ventricle.  There was no ischemia is identified, and notably, this was not significant changed when compared to July of 2019. Ultimately, he was again found to have a gangrene toe and underwent an amputation around the same time.      Cardiac standpoint he states his current major issues are continued intermittent symptomatic orthostasis.  He thinks this is his present about 50% of the time but sometimes he will go up for a few days or even a week without much symptoms.  When he gets his symptoms he notes his blood pressure drops and to around 80 systolic and does not come up so he asked to sit or lie down for a while.  Sometimes this also happens when he is up and around and he gets diaphoretic weak and tired and some chest discomfort.  This pattern however is been stable since his stress test of April of this year.  No new chest discomfort.  Denies orthopnea, PND, new edema but has some chronic edema.  Continues to have issues with his diabetes, neuropathy, and history of osteomyelitis.  He was recently hospitalized " from warmth osteomyelitis and toe amputation and states he is been told to stay off of his feet as much as possible currently.  He denies syncope or near syncope, palpitations, falls, bleeding.  He continues on anticoagulation and Plavix given his extensive recent right coronary stenting a year ago with 3 overlapping stents, there is a remote history of circumflex stenting which was widely patent that angiogram last year, and extent of LAD stenting which is also widely patent, although most of his anterior wall is akinetic.    Labs done this week are reviewed with him.  Creatinine is stable at 1.13 with GFR below 65.  Electrolytes normal.  LDL controlled at 51.  Hemoglobin in the 11's.            Assessment/Plan:     1. Coronary artery disease.              --Long cardiac hx including MI in 2005 with proximal LAD stenting, repeat LAD stenting in 2009 and distal LAD stenting in 2013. In June 2017 he had stent placement to an obtuse marginal. In July 2019, he was found to have progression of his disease and underwent SUSAN x 3 to the RCA.  Clinically I do not get a history suggesting recurrent ischemia and he has a recent stress study without stress-induced ischemia, but demonstrating his old anterior MI.  Therefore we will continue his current medical regimen as long as his orthostasis and blood pressure are manageable.    2-symptomatic orthostatic hypotension-which is worsening over time.  I suspect there is an element of autonomic insufficiency from his difficult diabetes control.  He is going to try to keep close track of how often he is having these symptoms and to what degree.  We may have to titrate further off some of his drugs and affect his blood pressure.  However when they were completely temporarily stopped he got hypertensive.  However I would not preclude titrating back on some of his carvedilol.  He did not think it helped previously to stop his isosorbide monitor nitrate although it did help to stop his  lisinopril.  We will continue to have to follow this carefully.      2.  Chronic systolic heart failure, known ischemic cardiomyopathy.              --Known chronic EF 35-40%, which remained unchanged per last echo July 2019 when he returned with angina, requiring repeat intervention.  Currently without symptoms of acute or significant volume overload.  Continue his current diuresis.  Renal function stable.  Electrolytes stable.                   3. Chronic atrial fibrillation.              --Appears rate controlled, self reports heart rates are in the 70s. Holter performed in April showed average HR 88 with chronic afib.               --Continue Eliquis 5mg BID.  No bleeding issues.    4-status post recent coronary stenting, most recent extensive right coronary stenting a year ago.  Now a year out but with multiple overlapping stents, and tolerating, I would like to continue on clopidogrel as well as his anticoagulation.     Current regimen will be continued and he will track his symptoms status.  I will have him follow-up in 3 months.    Orders Placed This Encounter   Procedures     Basic metabolic panel     Follow-Up with [facility] visit       No orders of the defined types were placed in this encounter.      There are no discontinued medications.      Encounter Diagnoses   Name Primary?     Coronary artery disease involving native coronary artery of native heart with angina pectoris (H) Yes     Chronic systolic congestive heart failure (H)      Orthostatic hypotension      Ischemic cardiomyopathy      Chronic atrial fibrillation (H)      Long term current use of anticoagulant therapy        CURRENT MEDICATIONS:  Current Outpatient Medications   Medication Sig Dispense Refill     apixaban ANTICOAGULANT (ELIQUIS) 5 MG tablet Take 1 tablet (5 mg) by mouth 2 times daily       ASPIRIN NOT PRESCRIBED (INTENTIONAL) Antiplatelet medication not prescribed intentionally due to Current anticoagulant therapy  (warfarin/enoxaparin)       atorvastatin (LIPITOR) 40 MG tablet Take 1 tablet (40 mg) by mouth every evening 90 tablet 3     Cadexomer Iodine, topical, 0.9% (IODOSORB) 0.9 % GEL gel Apply to ulcer on left foot every other day with gauze dressing. 40 g 0     carvedilol (COREG) 25 MG tablet Take 25 mg by mouth 2 times daily (with meals)        clopidogrel (PLAVIX) 75 MG tablet Take 1 tablet (75 mg) by mouth daily 90 tablet 3     furosemide (LASIX) 40 MG tablet Take 1 tablet (40 mg) by mouth daily 90 tablet 3     gabapentin (NEURONTIN) 100 MG capsule Take 3 capsules (300 mg) by mouth At Bedtime 270 capsule 2     insulin aspart (NOVOLOG FLEXPEN) 100 UNIT/ML pen Inject 8 Units Subcutaneous daily (with lunch)       insulin aspart (NOVOLOG FLEXPEN) 100 UNIT/ML pen Inject 10 Units Subcutaneous daily (with dinner)       insulin aspart (NOVOLOG FLEXPEN) 100 UNIT/ML pen Inject Subcutaneous 3 times daily (with meals) Sliding Scale  Do not give sliding scale insulin if Pre-Meal BG less than 140.   For Pre-Meal:   - 200 give 2 units.    - 250 give 4 units.    - 300 give 6 units.    - 350 give 8 units.    - 400 give 10 units.       insulin aspart (NOVOLOG PEN) 100 UNIT/ML pen Inject 8 Units Subcutaneous daily (with breakfast)        insulin glargine (LANTUS PEN) 100 UNIT/ML pen Inject 42 Units Subcutaneous every morning 30 mL 1     insulin pen needle 31G X 6 MM Use  as directed. 100 each prn     isosorbide mononitrate (IMDUR) 60 MG 24 hr tablet Take 1 tablet (60 mg) by mouth daily 90 tablet 0     levothyroxine (SYNTHROID, LEVOTHROID) 137 MCG tablet Take 137 mcg by mouth every evening  90 tablet 3     nitroglycerin (NITROSTAT) 0.4 MG sublingual tablet Place 1 tablet (0.4 mg) under the tongue every 5 minutes as needed for chest pain 50 tablet 0     order for DME Equipment being ordered: size 12 surgical shoe 1 Device 0     order for DME Equipment being ordered: Walker Wheels () and Walker  ()  Treatment Diagnosis: Impaired gait, heel WB bilaterally. 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     Cardiomyopathy (H)      Cellulitis of left lower extremity 3/13/2018     Cerebral infarction (H)     eye sight decreased in peripheral of right side and blurry     Diabetic ulcer of left midfoot associated with type 2 diabetes mellitus, with fat layer exposed (H) 3/13/2018     Essential hypertension, benign 11/13/2002     Generalized osteoarthrosis, unspecified site 11/13/2002     Microalbuminuria 3/13/2018    X1     Myocardial infarction (H)      Neuropathy      Sepsis (H)      Sleep apnea     doesn't use it     Substance abuse (H)      Syncope, unspecified syncope type 3/13/2018     Thyroid disease     takes medicine     Tobacco use disorder 11/13/2002     Type 2 diabetes mellitus with diabetic peripheral angiopathy without gangrene, unspecified long term insulin use status 2005       PAST SURGICAL HISTORY:  Past Surgical History:   Procedure Laterality Date     AMPUTATE TOE(S) Right 1/6/2019    Procedure: Right open second toe partial amputation;  Surgeon: Sabino Garcia DPM;  Location: [facility] OR     AMPUTATE TOE(S) Right 1/8/2019    Procedure: 1.  Revision right second toe amputation with resection of distal half of proximal phalanx with full closure.    2.  Debridement of callus/preulcerative lesion, distal left second toe and plantar left first metatarsophalangeal joint.;  Surgeon: Ryan Bhagat DPM;  Location: [facility] OR     AMPUTATE  TOE(S) Right 3/12/2019    Procedure: Partial right fourth toe amputation.;  Surgeon: Ryan Bhagat DPM;  Location: [facility] OR     AMPUTATE TOE(S) Right 5/6/2019    Procedure: Right partial third toe amputation;  Surgeon: Татьяна Mckeon DPM, Podiatry/Foot and Ankle Surgery;  Location: [facility] OR     AMPUTATE TOE(S) Left 4/18/2020    Procedure: LEFT SECOND TOE AMPUTATION;  Surgeon: Ryan Bhagat DPM;  Location: [facility] OR     ANGIOGRAM  6/29/05    subtotal occ.mid LAD,SUSAN mid LAD     ANGIOGRAM  7/05    mild CAD,patent stent,no flow-limiting lesions,sev.LV dysf.LAD enlarged     ANGIOGRAM  2/09    Sev.single vessel disease,Mod LV dysf.distal LAD 90%,70-75% mid lad just before prev stent,SUSAN to prox.mid LAD, endeavor to distal LAD     ANGIOGRAM  11/13/13    restenosis, stent LAD     BACK SURGERY      back surgery x3     C NONSPECIFIC PROCEDURE      Laminectomy x 3 - (1983 x 2 & 1990)     C NONSPECIFIC PROCEDURE Bilateral 1998    Bunionectomy     C NONSPECIFIC PROCEDURE  1959    Gingival surgery at age 9     CV CORONARY ANGIOGRAM N/A 7/8/2019    Procedure: Coronary Angiogram;  Surgeon: Jose Alfredo Crockett MD;  Location:  [facility] C[facility] LAB     CV LEFT HEART CATH N/A 7/8/2019    Procedure: Left Heart Cath;  Surgeon: Jose Alfredo Crockett MD;  Location:  [facility]     CV PCI ASPIRATION THROMECTOMY N/A 7/8/2019    Procedure: PCI Aspiration Thrombectomy;  Surgeon: Jose Alfredo Crockett MD;  Location:  [facility]     CV PCI STENT DRUG ELUTING N/A 7/8/2019    Procedure: PCI Stent Drug Eluting;  Surgeon: Jose Alfredo Crockett MD;  Location:  HE[facility]ARD[facility] CATH LAB     HEART CATH LEFT HEART CATH  06/13/2017    SUSAN to OM3     INCISION AND DRAINAGE TOE, COMBINED Bilateral 9/11/2018    Procedure: COMBINED INCISION AND DRAINAGE TOE;  1) Irrigation and debridement ulcer left great toe  2) Amputation 3rd toe right foot at distal interphalangeal joint level;  Surgeon: Miki Harp MD;  Location:   OR     IRRIGATION AND DEBRIDEMENT FOOT, COMBINED Left 3/12/2019    Procedure: Debridement of left foot ulcer sub first MPJ 2 x 2.5 cm down to and including subcutaneous tissue, callus debridement for preulcerative lesion, left second toe.;  Surgeon: Ryan Bhagat DPM;  Location: [facility] OR     ORTHOPEDIC SURGERY      bunion surgery both feet     ORTHOPEDIC SURGERY      left shoulder     SOFT TISSUE SURGERY      debridement of toe numerous time     VASCULAR SURGERY      7 stents in heart       FAMILY HISTORY:  Family History   Problem Relation Age of Onset     Cancer - colorectal Sister      Thyroid Disease Brother      Cardiovascular Brother      Diabetes Brother      Cancer Father         tumor in chest and throat     Arthritis Mother      Thyroid Disease Mother      Diabetes Mother      Glaucoma No family hx of      Macular Degeneration No family hx of        SOCIAL HISTORY:  Social History     Socioeconomic History     Marital status:      Spouse name: None     Number of children: None     Years of education: None     Highest education level: None   Occupational History     None   Social Needs     Financial resource strain: None     Food insecurity     Worry: None     Inability: None     Transportation needs     Medical: None     Non-medical: None   Tobacco Use     Smoking status: Former Smoker     Packs/day: 1.00     Years: 25.00     Pack years: 25.00     Types: Cigarettes     Start date: 1980     Last attempt to quit: 7/25/2005     Years since quitting: 15.0     Smokeless tobacco: Never Used   Substance and Sexual Activity     Alcohol use: Yes     Alcohol/week: 4.0 standard drinks     Types: 4 Shots of liquor per week     Frequency: Never     Comment: OCCASSIONALLY      Drug use: No     Sexual activity: Yes     Partners: Female   Lifestyle     Physical activity     Days per week: None     Minutes per session: None     Stress: None   Relationships     Social connections     Talks on phone: None     Gets  together: None     Attends [employer] service: None     Active member of club or organization: None     Attends meetings of clubs or organizations: None     Relationship status: None     Intimate partner violence     Fear of current or ex partner: None     Emotionally abused: None     Physically abused: None     Forced sexual activity: None   Other Topics Concern     Parent/sibling w/ CABG, MI or angioplasty before 65F 55M? Yes      Service Not Asked     Blood Transfusions Not Asked     Caffeine Concern No     Comment: 3 cups daily     Occupational Exposure Not Asked     Hobby Hazards Not Asked     Sleep Concern Not Asked     Stress Concern Not Asked     Weight Concern Not Asked     Special Diet Yes     Comment: watching carb intake     Back Care Not Asked     Exercise No     Comment: some walking     Bike Helmet Not Asked     Seat Belt Not Asked     Self-Exams Not Asked   Social History Narrative     None         Physical exam markedly limited by necessity of tele-visit due to pandemic.  States his weight is stable  Talks in long sentences without cough wheeze or other shortness of breath  States minimal ankle edema and no open sores  Alert and oriented, fluent speech, normal mentation  Grossly normal affect and no pressured or tardive speech    Phone call duration: 16 minutes      Thank you for allowing me to participate in the care of your patient.    Sincerely,     Justin Tomlin MD     [facility]

## 2020-08-20 ENCOUNTER — HOSPITAL ENCOUNTER (OUTPATIENT)
Dept: ULTRASOUND IMAGING | Facility: CLINIC | Age: 70
Discharge: HOME | End: 2020-08-20
Attending: PODIATRIST | Admitting: PODIATRIST
Payer: COMMERCIAL

## 2020-08-20 ENCOUNTER — OFFICE VISIT (OUTPATIENT)
Dept: FAMILY MEDICINE | Facility: CLINIC | Age: 70
End: 2020-08-20
Payer: COMMERCIAL

## 2020-08-20 ENCOUNTER — TELEPHONE (OUTPATIENT)
Dept: PODIATRY | Facility: CLINIC | Age: 70
End: 2020-08-20

## 2020-08-20 VITALS
WEIGHT: 262.2 LBS | HEIGHT: 76 IN | RESPIRATION RATE: 16 BRPM | SYSTOLIC BLOOD PRESSURE: 120 MMHG | TEMPERATURE: 97 F | DIASTOLIC BLOOD PRESSURE: 70 MMHG | HEART RATE: 77 BPM | OXYGEN SATURATION: 99 % | BODY MASS INDEX: 31.93 KG/M2

## 2020-08-20 DIAGNOSIS — G25.81 RESTLESS LEGS SYNDROME (RLS): ICD-10-CM

## 2020-08-20 DIAGNOSIS — Z79.4 TYPE 2 DIABETES MELLITUS WITH DIABETIC NEPHROPATHY, WITH LONG-TERM CURRENT USE OF INSULIN (H): Primary | ICD-10-CM

## 2020-08-20 DIAGNOSIS — E11.621 DIABETIC ULCER OF OTHER PART OF LEFT FOOT ASSOCIATED WITH TYPE 2 DIABETES MELLITUS, WITH FAT LAYER EXPOSED (H): ICD-10-CM

## 2020-08-20 DIAGNOSIS — Z89.422 H/O AMPUTATION OF LESSER TOE, LEFT (H): ICD-10-CM

## 2020-08-20 DIAGNOSIS — E11.42 DIABETIC POLYNEUROPATHY ASSOCIATED WITH TYPE 2 DIABETES MELLITUS (H): ICD-10-CM

## 2020-08-20 DIAGNOSIS — Z89.421 H/O AMPUTATION OF LESSER TOE, RIGHT (H): ICD-10-CM

## 2020-08-20 DIAGNOSIS — L97.522 DIABETIC ULCER OF OTHER PART OF LEFT FOOT ASSOCIATED WITH TYPE 2 DIABETES MELLITUS, WITH FAT LAYER EXPOSED (H): ICD-10-CM

## 2020-08-20 DIAGNOSIS — E11.21 TYPE 2 DIABETES MELLITUS WITH DIABETIC NEPHROPATHY, WITH LONG-TERM CURRENT USE OF INSULIN (H): Primary | ICD-10-CM

## 2020-08-20 PROCEDURE — 99214 OFFICE O/P EST MOD 30 MIN: CPT | Performed by: PHYSICIAN ASSISTANT

## 2020-08-20 PROCEDURE — 93922 UPR/L XTREMITY ART 2 LEVELS: CPT

## 2020-08-20 RX ORDER — GABAPENTIN 300 MG/1
600-900 CAPSULE ORAL AT BEDTIME
Qty: 180 CAPSULE | Refills: 1 | Status: SHIPPED | OUTPATIENT
Start: 2020-08-20 | End: 2021-01-03

## 2020-08-20 ASSESSMENT — MIFFLIN-ST. JEOR: SCORE: 2050.83

## 2020-08-20 NOTE — TELEPHONE ENCOUNTER
I called and spoke with patient and informed him of results per provider. Patient verbalized understanding with no further questions.    Kandice MICHELLE CMA

## 2020-08-20 NOTE — TELEPHONE ENCOUNTER
----- Message from Татьяна Mckeon DPM, Podiatry/Foot and Ankle Surgery sent at 8/20/2020  1:10 PM CDT -----  bloodflow to feet is okay, not great but there is not much at this point they could likely do to improve it unless it were to get worse.     Please let patient know.     Татьяна Mckeon DPM

## 2020-08-20 NOTE — PROGRESS NOTES
"Subjective     Jayro Elder is a 70 year old male who presents to clinic today for the following health issues:    HPI     Patient is wanting to discuss possible different medication or addition to Gabapentin.   The pain is in the legs bilaterally   Will feel restless, sharp and shooting downwards  Occurring in the tops of the feet and bottoms as well as the lower legs  More tingly, numb in the legs  He is checking blood sugars; unsure about his next appt  Taking 5 gabapentin at night some nights   -bad about 2-3 nights/weekly        Review of Systems   Constitutional, HEENT, cardiovascular, pulmonary, gi and gu systems are negative, except as otherwise noted.      Objective    /70   Pulse 77   Temp 97  F (36.1  C) (Tympanic)   Resp 16   Ht 1.93 m (6' 4\")   Wt 118.9 kg (262 lb 3.2 oz)   SpO2 99%   BMI 31.92 kg/m    Body mass index is 31.92 kg/m .  Physical Exam   GENERAL: healthy, alert and no distress  MS: he is in a surgical shoe on the left which I didn't remove. There is no lena edema in either leg. I reviewed podiatries recent note with neuropathy testing  SKIN: no suspicious lesions or rashes on the lower legs. I did not remove his shoes        Assessment & Plan     1. Type 2 diabetes mellitus with diabetic nephropathy, with long-term current use of insulin (H)  2. Restless legs syndrome (RLS)  We can trial increased doses - renal function stable. Choosing this option as he does tolerate the medication. I pleaded with him to get control of his diabetes and follow up with his endocrinologist. I explained how it would benefit his symptoms and general care. Im not sure he agrees with the message.   - gabapentin (NEURONTIN) 300 MG capsule; Take 2-3 capsules (600-900 mg) by mouth At Bedtime  Dispense: 180 capsule; Refill: 1        No follow-ups on file.    Davion Cordova PA-C  [facility]    "

## 2020-08-21 ENCOUNTER — TELEPHONE (OUTPATIENT)
Dept: FAMILY MEDICINE | Facility: CLINIC | Age: 70
End: 2020-08-21

## 2020-08-21 DIAGNOSIS — K21.9 GASTROESOPHAGEAL REFLUX DISEASE, ESOPHAGITIS PRESENCE NOT SPECIFIED: ICD-10-CM

## 2020-08-21 RX ORDER — INSULIN ASPART 100 [IU]/ML
INJECTION, SOLUTION INTRAVENOUS; SUBCUTANEOUS
Status: CANCELLED | OUTPATIENT
Start: 2020-08-21

## 2020-08-21 NOTE — TELEPHONE ENCOUNTER
Received phone call transfer from [place], patient wanted you to know that he is very upset that he has to do a telephone visit with Endo instead of an in person visit. He also states that they were not able to get him in for a month and he is out of medications now. He also said that they will not fill his medication, but I am not sure what that means.     I attempted to help patient but he ended up hanging up on me.   Isabelle Tucker MA

## 2020-08-21 NOTE — TELEPHONE ENCOUNTER
Routing refill request to provider for review/approval because:  Medication novolog is reported/historical.     Protonix was refilled by another provider patient does not see anymore.    Patient has telehone appt with Endo in Sept.      Melissa Hewitt RN

## 2020-08-21 NOTE — TELEPHONE ENCOUNTER
"  Medication Question or Refill    Who is calling: Jayro Esparza    What medication are you calling about (include dose and sig)?: pantoprazole (PROTONIX) 40 MG enteric coated tablet  insulin aspart (NOVOLOG FLEXPEN) 100 UNIT/ML pen     Controlled Substance Agreement on file: N/a    Who prescribed the medication?: n/a    Do you need a refill? Yes: 1 week left of protonix and one \"stick\" left of the Novolog    When did you use the medication last? Today 8/21    Patient offered an appointment? No, has upcoming appt 9/21    Do you have any questions or concerns?  Yes: pt doesn't have enough medication to get to appointment, requesting at least a bridge refill    Requested Pharmacy:    [id number] - [place], [place] - [address]      Okay to leave a detailed message?: Yes at Cell number on file:    Telephone Information:   Mobile [phone]                       "

## 2020-08-24 RX ORDER — PANTOPRAZOLE SODIUM 40 MG/1
40 TABLET, DELAYED RELEASE ORAL
Qty: 90 TABLET | Refills: 1 | Status: SHIPPED | OUTPATIENT
Start: 2020-08-24 | End: 2021-03-09

## 2020-08-27 ENCOUNTER — OFFICE VISIT (OUTPATIENT)
Dept: PODIATRY | Facility: CLINIC | Age: 70
End: 2020-08-27
Payer: COMMERCIAL

## 2020-08-27 VITALS
BODY MASS INDEX: 31.9 KG/M2 | SYSTOLIC BLOOD PRESSURE: 120 MMHG | WEIGHT: 262 LBS | HEIGHT: 76 IN | DIASTOLIC BLOOD PRESSURE: 76 MMHG

## 2020-08-27 DIAGNOSIS — E11.42 DIABETIC POLYNEUROPATHY ASSOCIATED WITH TYPE 2 DIABETES MELLITUS (H): Primary | ICD-10-CM

## 2020-08-27 DIAGNOSIS — L84 PRE-ULCERATIVE CALLUSES: ICD-10-CM

## 2020-08-27 DIAGNOSIS — M20.42 HAMMERTOE, BILATERAL: ICD-10-CM

## 2020-08-27 DIAGNOSIS — Z89.421 H/O AMPUTATION OF LESSER TOE, RIGHT (H): ICD-10-CM

## 2020-08-27 DIAGNOSIS — M20.41 HAMMERTOE, BILATERAL: ICD-10-CM

## 2020-08-27 PROCEDURE — 99213 OFFICE O/P EST LOW 20 MIN: CPT | Performed by: PODIATRIST

## 2020-08-27 ASSESSMENT — MIFFLIN-ST. JEOR: SCORE: 2049.92

## 2020-08-27 NOTE — LETTER
"    8/27/2020         RE: Jayro Elder  [address] [place] [address]        Dear Colleague,    Thank you for referring your patient, Jayro Elder, to the [facility]. Please see a copy of my visit note below.    Podiatry / Foot and Ankle Surgery Progress Note    August 27, 2020    Subject: Patient was seen for follow up on left foot ulceration.  He is home care coming and doing dressing changes 3 times a week with Iodosorb.  Notes that it has not really drained in the last week or 2.  Denies fever, nausea, vomiting.  No concerns today.    Objective:  Vitals: /74   Ht 1.93 m (6' 4\")   Wt 118.8 kg (262 lb)   BMI 31.89 kg/m    BMI= Body mass index is 31.89 kg/m .    A1C: 10.4 (4/2020)     General:  Patient is alert and orientated.  NAD    Vascular:  DP and PT pulses are palpable.  No varicosities noted  CFT's < 3secs.  Skin temp is normal     Neuro:  Light and gross touch sensation absent to feet.      Derm: Ulcer to the plantar left first met head is healed.  No open lesions or signs of infection noted.        Musculoskeletal: multiple previous toe amputations right foot.  Left second toe amputated previously.     Assessment:     Diabetic polyneuropathy associated with type 2 diabetes mellitus (H)  Pre-ulcerative calluses  Hammer toes of both feet  H/O amputation of lesser toe, right (H)     Plan: At this time the wound is healed and there is very minimal pre-ulcerative callus noted to the area.  Patient did not want this debrided today.  At this time since is healed we would have him follow-up as needed.  He was told to call with further questions or concerns.    Татьяна Mckeon DPM, Podiatry/Foot and Ankle Surgery    Weight management plan: Patient was referred to their PCP to discuss a diet and exercise plan.        Again, thank you for allowing me to participate in the care of your patient.        Sincerely,        Татьяна Mckeon DPM, Podiatry/Foot and Ankle " Surgery

## 2020-08-27 NOTE — PROGRESS NOTES
"Podiatry / Foot and Ankle Surgery Progress Note    August 27, 2020    Subject: Patient was seen for follow up on left foot ulceration.  He is home care coming and doing dressing changes 3 times a week with Iodosorb.  Notes that it has not really drained in the last week or 2.  Denies fever, nausea, vomiting.  No concerns today.    Objective:  Vitals: /74   Ht 1.93 m (6' 4\")   Wt 118.8 kg (262 lb)   BMI 31.89 kg/m    BMI= Body mass index is 31.89 kg/m .    A1C: 10.4 (4/2020)     General:  Patient is alert and orientated.  NAD    Vascular:  DP and PT pulses are palpable.  No varicosities noted  CFT's < 3secs.  Skin temp is normal     Neuro:  Light and gross touch sensation absent to feet.      Derm: Ulcer to the plantar left first met head is healed.  No open lesions or signs of infection noted.        Musculoskeletal: multiple previous toe amputations right foot.  Left second toe amputated previously.     Assessment:     Diabetic polyneuropathy associated with type 2 diabetes mellitus (H)  Pre-ulcerative calluses  Hammer toes of both feet  H/O amputation of lesser toe, right (H)     Plan: At this time the wound is healed and there is very minimal pre-ulcerative callus noted to the area.  Patient did not want this debrided today.  At this time since is healed we would have him follow-up as needed.  He was told to call with further questions or concerns.    Татьяна Mckeon DPM, Podiatry/Foot and Ankle Surgery    Weight management plan: Patient was referred to their PCP to discuss a diet and exercise plan.      "

## 2020-09-08 DIAGNOSIS — I25.10 CAD (CORONARY ARTERY DISEASE): ICD-10-CM

## 2020-09-08 RX ORDER — ISOSORBIDE MONONITRATE 60 MG/1
60 TABLET, EXTENDED RELEASE ORAL DAILY
Qty: 90 TABLET | Refills: 3 | Status: ON HOLD | OUTPATIENT
Start: 2020-09-08 | End: 2021-01-04

## 2020-09-08 NOTE — OP NOTE
Procedure Date: 04/18/2020      SURGEON:  Ryan Bhagat DPM      PREOPERATIVE DIAGNOSES:   1.  Diabetes mellitus with peripheral neuropathy.   2.  Status post previous toe amputations for infection.   3.  Necrotic wound, distal left second toe with x-ray concerning for gas gangrene.      POSTOPERATIVE DIAGNOSES:   1.  Diabetes mellitus with peripheral neuropathy.   2.  Status post previous toe amputations for infection.   3.  Necrotic wound, distal left second toe with x-ray concerning for gas gangrene.      PROCEDURE:  Partial left second toe amputation.      PATHOLOGY:   1.  Tissue was sent off for aerobic and anaerobic cultures.   2.  The toe was sent for gross evaluation.      ANESTHESIA:  MAC with local.      HEMOSTASIS:  None.      ESTIMATED BLOOD LOSS:  Less than 10 mL      MATERIALS:  None.      INJECTABLES:  10 mL of 0.25% bupivacaine plain preoperatively.      COMPLICATIONS:  None apparent.      INDICATIONS FOR PROCEDURE:  The patient is a pleasant 69-year-old neuropathic diabetic male well known to our department who has undergone multiple previous toe amputations.  He came into my clinic earlier this week with worsening wounds of first MP joint, left foot and at the distal aspect of the left second toe.  While the area certainly was erythematous and edematous, there was no exposed bone; however, x-rays were read as showing gas gangrene.  Based on the patient's pain levels and the clinical exam, I advised proceeding with amputation of the distal aspect of the toe.  As the patient is a moderate risk for coronavirus, we planned to do this is a same day procedure rather than admission, especially as the patient was showing no ascension of the infection into the foot nor was he showing any constitutional signs of infection.      DESCRIPTION OF PROCEDURE:  After obtaining written consent, the patient was transferred to the operating room, placed in the supine position on the operating room table.  IV  sedation was initiated.  The foot was anesthetized with preoperative local.  It was then prepped and draped in normal aseptic fashion.  No tourniquet was utilized.  The patient, procedure and site were correctly identified by OR staff.      Procedure #1:  Attention was directed to the left second toe.  The wounds of first MP joint was fully healed, so covering this was not necessary.  A fishmouth incision with apices dorsal and plantar were drawn over the proximal interphalangeal joint and then carried down to bone.  Subperiosteal dissection was performed to the head of the proximal phalanx, and a through-and-through osteotomy was performed at the neck of the proximal phalanx with a sagittal saw.  The end of the toe was handed off and cultures were obtained from the wound.  After adequate hemostasis was achieved, the wound was flushed with copious amounts of normal saline.  Skin edges were reapproximated and closed with minimal tension with 3-0 and 4-0 nylon.      A dry sterile dressing was applied to the patient's left foot.  He appeared to tolerate the procedure and anesthesia well and was transferred to the PACU with vital signs stable and vascular status intact to remaining portions of the foot.      The patient will be discharged to home and will follow up with Dr. Root in 5 days in my absence or sooner with any acute issues.         CONCHITA LOO DPM             D: 2020   T: 2020   MT: TOM      Name:     PORTER YANCEY   MRN:      [id number]        Account:        [id number]   :      1950           Procedure Date: 2020      Document: [id number]     detailed exam

## 2020-09-11 ENCOUNTER — TELEPHONE (OUTPATIENT)
Dept: ENDOCRINOLOGY | Facility: CLINIC | Age: 70
End: 2020-09-11

## 2020-09-11 DIAGNOSIS — Z79.4 TYPE 2 DIABETES MELLITUS WITH HYPEROSMOLARITY WITHOUT COMA, WITH LONG-TERM CURRENT USE OF INSULIN (H): Primary | ICD-10-CM

## 2020-09-11 DIAGNOSIS — E11.00 TYPE 2 DIABETES MELLITUS WITH HYPEROSMOLARITY WITHOUT COMA, WITH LONG-TERM CURRENT USE OF INSULIN (H): Primary | ICD-10-CM

## 2020-09-11 NOTE — TELEPHONE ENCOUNTER
Called patient, scheduled a lab appoint on 9/15/20. Patient has a visit with Dr Bolivar on 9/21/20. Below was from 9/21/20 appointment notes. Please order appropriate labs.  Swathi Perez RN          dm, lm to get labs, get 10 d of bs, [phone]

## 2020-09-11 NOTE — TELEPHONE ENCOUNTER
Patient received a call from the clinic yesterday (no note in patient's chart). There is a note in the appointment but writer did not know the meaning of the abbreviations and no lab orders in for patient. Call him back to advise.

## 2020-09-15 DIAGNOSIS — Z79.4 TYPE 2 DIABETES MELLITUS WITH HYPEROSMOLARITY WITHOUT COMA, WITH LONG-TERM CURRENT USE OF INSULIN (H): ICD-10-CM

## 2020-09-15 DIAGNOSIS — E11.00 TYPE 2 DIABETES MELLITUS WITH HYPEROSMOLARITY WITHOUT COMA, WITH LONG-TERM CURRENT USE OF INSULIN (H): ICD-10-CM

## 2020-09-15 LAB — HBA1C MFR BLD: 9.8 % (ref 0–5.6)

## 2020-09-15 PROCEDURE — 83036 HEMOGLOBIN GLYCOSYLATED A1C: CPT | Performed by: INTERNAL MEDICINE

## 2020-09-15 PROCEDURE — 36415 COLL VENOUS BLD VENIPUNCTURE: CPT | Performed by: INTERNAL MEDICINE

## 2020-09-21 ENCOUNTER — VIRTUAL VISIT (OUTPATIENT)
Dept: ENDOCRINOLOGY | Facility: CLINIC | Age: 70
End: 2020-09-21
Payer: COMMERCIAL

## 2020-09-21 DIAGNOSIS — Z79.4 TYPE 2 DIABETES MELLITUS WITH HYPEROSMOLARITY WITHOUT COMA, WITH LONG-TERM CURRENT USE OF INSULIN (H): ICD-10-CM

## 2020-09-21 DIAGNOSIS — E11.00 TYPE 2 DIABETES MELLITUS WITH HYPEROSMOLARITY WITHOUT COMA, WITH LONG-TERM CURRENT USE OF INSULIN (H): ICD-10-CM

## 2020-09-21 PROCEDURE — 99214 OFFICE O/P EST MOD 30 MIN: CPT | Mod: 95 | Performed by: INTERNAL MEDICINE

## 2020-09-21 RX ORDER — INSULIN ASPART 100 [IU]/ML
INJECTION, SOLUTION INTRAVENOUS; SUBCUTANEOUS
Qty: 30 ML | Refills: 1 | Status: SHIPPED | OUTPATIENT
Start: 2020-09-21 | End: 2021-01-03

## 2020-09-21 NOTE — PROGRESS NOTES
"This is a telephone encounter.  Declined video visit.    Start time: 11:04  End time: 11:29    More than 10 min spent reviewing chart, labs, results, imaging studies and in patient communication.    In the setting of COVID-19 outbreak patients visits are transitioned to phone visits/ virtual visits as per system and leadership guidelines.  I have personally reviewed data including notes, lab tests. I have reviewed and interpreted images personally.  This encounter is potentially equivalent to NEW 4 level (98431) clinic visit.    The patient has been notified of following:      \"This telephone visit will be conducted via a call between you and your physician/provider. We have found that certain health care needs can be provided without the need for a physical exam.  This service lets us provide the care you need with a short phone conversation.  If a prescription is necessary we can send it directly to your pharmacy.  If lab work is needed we can place an order for that and you can then stop by our lab to have the test done at a later time.     We will bill your insurance company for this service.  Please check with your medical insurance if this type of visit is covered. You may be responsible for the cost of this type of visit if insurance coverage is denied.  The typical cost is $30 (10min), $59 (11-20min) and $85 (21-30min).  Most often these visits are shorter than 10 minutes. With new updates with corona virus patient might be billed as clinic visit.     If during the course of the call the physician/provider feels a telephone visit is not appropriate, you will not be charged for this service.\"      Past medical history, social history, family history, allergy and medications were reviewed and updated as appropriate.  Reviewed all pertinent labs, notes and imaging studies as appropriate.    Patient verbally consented to phone visit today: yes.    Disclaimer: This note consists of symbols derived from " keyboarding, dictation and/or voice recognition software. As a result, there may be errors in the script that have gone undetected. Please consider this when interpreting information found in this chart.    ROS neg expect noted in notes.  Patient feels well at this time and denies any tachycardia, palpitations, heat intolerance, tremor, insomnia, diarrhea, or unexplained weight loss.  Patient also denies  cold intolerance, constipation, or unexplained weight gain.   ENDOCRINOLOGY CLINIC NOTE:  Name: Jayro Elder  Seen for f/u of Diabetes. Last visit 5/2020.  HPI:  Jayro Elder is a 70 year old male who presents for the evaluation/management of Diabetes.   has a past medical history of CAD (coronary artery disease) (6/29/05), Cancer (H), Cardiomyopathy (H), Cellulitis of left lower extremity (3/13/2018), Cerebral infarction (H), Diabetic ulcer of left midfoot associated with type 2 diabetes mellitus, with fat layer exposed (H) (3/13/2018), Essential hypertension, benign (11/13/2002), Generalized osteoarthrosis, unspecified site (11/13/2002), Microalbuminuria (3/13/2018), Myocardial infarction (H), Neuropathy, Sepsis (H), Sleep apnea, Substance abuse (H), Syncope, unspecified syncope type (3/13/2018), Thyroid disease, Tobacco use disorder (11/13/2002), and Type 2 diabetes mellitus with diabetic peripheral angiopathy without gangrene, unspecified long term insulin use status (2005).    He also has chronic afib (on AC), and known coronary disease with prior MI/stenting and resultant ischemic CMO with chronic EF of 35-40%. H/o CVA per his report. Confirmed on MRI with his neurology team.   Also followed by podiatry gangrene of toe and is s/p left 2nd toe amputation.    Joey is not covered by insurance.  Followed by podiatry for ulcer follow-up on the left foot.    Is not interested in CDE follow up.      1. Type 2 DM:  Orginally diagnosed at the age of: in 40s. On insulin X 10 years.  Current Regimen:   LANTUS  42 Units in AM. Novolog baseline  8/8/10 Units with breakfast/lunch/dinner respectively + on sliding scale insulin ( but not using it consistently- only check before breakfast). Mostly estimating about 10-14 Units with each meal.    Diabetes Medication(s)     Insulin       insulin aspart (NOVOLOG FLEXPEN) 100 UNIT/ML pen    Inject 8 Units Subcutaneous daily (with lunch)     insulin aspart (NOVOLOG FLEXPEN) 100 UNIT/ML pen    Inject 10 Units Subcutaneous daily (with dinner)     insulin aspart (NOVOLOG PEN) 100 UNIT/ML pen    Inject 8 Units Subcutaneous daily (with breakfast)      insulin glargine (LANTUS PEN) 100 UNIT/ML pen    Inject 42 Units Subcutaneous every morning     insulin aspart (NOVOLOG FLEXPEN) 100 UNIT/ML pen    Inject Subcutaneous 3 times daily (with meals) Sliding Scale  Do not give sliding scale insulin if Pre-Meal BG less than 140.   For Pre-Meal:   - 200 give 2 units.    - 250 give 4 units.    - 300 give 6 units.    - 350 give 8 units.    - 400 give 10 units.        Metformin- Not able to tolerate 2/2 to GI.  Also tried Toujeo but did not feel well on it.    BS checks: once a day (FBG)  Average Meter Download: mostly high in 200s. Limited Bg data available.  No major episodes of hypoglycemia noted/reported.     Exercise: not much.  Last A1c: 9.8%  Symptoms of hypoglycemia (low blood sugar):  Gets symptoms of hypoglycemia.  Episodes of hypoglycemia: rare    DM Complications:   Complications:   Diabetes Complications  Description / Detail    Diabetic Retinopathy   + DR per his report.   Problems with vision after strokes.   CAD / PAD   CAD and CVA.   Neuropathy  + neuropathy and s/p toe amputations   Nephropathy / Microalbuminuria  No  Lab Results   Component Value Date    UMALCR 49.68 03/12/2018         Gastroparesis  No   Hypoglycemia [place]  No       2. Hypertension:    On medications.  3. Hyperlipidemia: On medications.    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     Cardiomyopathy (H)      Cellulitis of left lower extremity 3/13/2018     Cerebral infarction (H)     eye sight decreased in peripheral of right side and blurry     Diabetic ulcer of left midfoot associated with type 2 diabetes mellitus, with fat layer exposed (H) 3/13/2018     Essential hypertension, benign 11/13/2002     Generalized osteoarthrosis, unspecified site 11/13/2002     Microalbuminuria 3/13/2018    X1     Myocardial infarction (H)      Neuropathy      Sepsis (H)      Sleep apnea     doesn't use it     Substance abuse (H)      Syncope, unspecified syncope type 3/13/2018     Thyroid disease     takes medicine     Tobacco use disorder 11/13/2002     Type 2 diabetes mellitus with diabetic peripheral angiopathy without gangrene, unspecified long term insulin use status 2005     Past Surgical History:   Procedure Laterality Date     AMPUTATE TOE(S) Right 1/6/2019    Procedure: Right open second toe partial amputation;  Surgeon: Sabino Garcia DPM;  Location: [facility] OR     AMPUTATE TOE(S) Right 1/8/2019    Procedure: 1.  Revision right second toe amputation with resection of distal half of proximal phalanx with full closure.    2.  Debridement of callus/preulcerative lesion, distal left second toe and plantar left first metatarsophalangeal joint.;  Surgeon: Ryan Bhagat DPM;  Location: [facility] OR     AMPUTATE TOE(S) Right 3/12/2019    Procedure: Partial right fourth toe amputation.;  Surgeon: yRan Bhagat DPM;  Location: [facility] OR     AMPUTATE TOE(S) Right 5/6/2019    Procedure: Right partial third toe amputation;  Surgeon: Татьяна Mckeon DPM, Podiatry/Foot and Ankle Surgery;  Location: [facility] OR     AMPUTATE TOE(S) Left 4/18/2020    Procedure: LEFT SECOND TOE AMPUTATION;  Surgeon: Ryan Bhagat DPM;  Location: [facility] OR     ANGIOGRAM   6/29/05    subtotal occ.mid LAD,SUSAN mid LAD     ANGIOGRAM  7/05    mild CAD,patent stent,no flow-limiting lesions,sev.LV dysf.LAD enlarged     ANGIOGRAM  2/09    Sev.single vessel disease,Mod LV dysf.distal LAD 90%,70-75% mid lad just before prev stent,SUSAN to prox.mid LAD, endeavor to distal LAD     ANGIOGRAM  11/13/13    restenosis, stent LAD     BACK SURGERY      back surgery x3     C NONSPECIFIC PROCEDURE      Laminectomy x 3 - (1983 x 2 & 1990)     C NONSPECIFIC PROCEDURE Bilateral 1998    Bunionectomy     C NONSPECIFIC PROCEDURE  1959    Gingival surgery at age 9     CV CORONARY ANGIOGRAM N/A 7/8/2019    Procedure: Coronary Angiogram;  Surgeon: Jose Alfredo Crockett MD;  Location:  [facility] CATH LAB     CV LEFT HEART CATH N/A 7/8/2019    Procedure: Left Heart Cath;  Surgeon: Jose Alfredo Crockett MD;  Location:  [facility]ATH [facility]     CV PCI ASPIRATION THROMECTOMY N/A 7/8/2019    Procedure: PCI Aspiration Thrombectomy;  Surgeon: Jose Alfredo Crockett MD;  Location:  HE[facility] CATH LAB     CV PCI STENT DRUG ELUTING N/A 7/8/2019    Procedure: PCI Stent Drug Eluting;  Surgeon: Jose Alfredo Crockett MD;  Location:  HE[facility]ARD[facility] CATH LAB     HEART CATH LEFT HEART CATH  06/13/2017    SUSAN to OM3     INCISION AND DRAINAGE TOE, COMBINED Bilateral 9/11/2018    Procedure: COMBINED INCISION AND DRAINAGE TOE;  1) Irrigation and debridement ulcer left great toe  2) Amputation 3rd toe right foot at distal interphalangeal joint level;  Surgeon: Miki Harp MD;  Location:  OR     IRRIGATION AND DEBRIDEMENT FOOT, COMBINED Left 3/12/2019    Procedure: Debridement of left foot ulcer sub first MPJ 2 x 2.5 cm down to and including subcutaneous tissue, callus debridement for preulcerative lesion, left second toe.;  Surgeon: Ryan Bhagat DPM;  Location:  OR     ORTHOPEDIC SURGERY      bunion surgery both feet     ORTHOPEDIC SURGERY      left shoulder     SOFT TISSUE SURGERY      debridement of toe  numerous time     VASCULAR SURGERY      7 stents in heart     Family Hx:  Family History   Problem Relation Age of Onset     Cancer - colorectal Sister      Thyroid Disease Brother      Cardiovascular Brother      Diabetes Brother      Cancer Father         tumor in chest and throat     Arthritis Mother      Thyroid Disease Mother      Diabetes Mother      Glaucoma No family hx of      Macular Degeneration No family hx of        Diabetes: brother with DM    Social Hx:  Social History     Socioeconomic History     Marital status:      Spouse name: Not on file     Number of children: Not on file     Years of education: Not on file     Highest education level: Not on file   Occupational History     Not on file   Social Needs     Financial resource strain: Not on file     Food insecurity     Worry: Not on file     Inability: Not on file     Transportation needs     Medical: Not on file     Non-medical: Not on file   Tobacco Use     Smoking status: Former Smoker     Packs/day: 1.00     Years: 25.00     Pack years: 25.00     Types: Cigarettes     Start date: 1980     Last attempt to quit: 7/25/2005     Years since quitting: 15.1     Smokeless tobacco: Never Used   Substance and Sexual Activity     Alcohol use: Yes     Alcohol/week: 4.0 standard drinks     Types: 4 Shots of liquor per week     Frequency: Never     Comment: OCCASSIONALLY      Drug use: No     Sexual activity: Yes     Partners: Female   Lifestyle     Physical activity     Days per week: Not on file     Minutes per session: Not on file     Stress: Not on file   Relationships     Social connections     Talks on phone: Not on file     Gets together: Not on file     Attends L[employer] service: Not on file     Active member of club or organization: Not on file     Attends meetings of clubs or organizations: Not on file     Relationship status: Not on file     Intimate partner violence     Fear of current or ex partner: Not on file     Emotionally abused:  Not on file     Physically abused: Not on file     Forced sexual activity: Not on file   Other Topics Concern     Parent/sibling w/ CABG, MI or angioplasty before 65F 55M? Yes      Service Not Asked     Blood Transfusions Not Asked     Caffeine Concern No     Comment: 3 cups daily     Occupational Exposure Not Asked     Hobby Hazards Not Asked     Sleep Concern Not Asked     Stress Concern Not Asked     Weight Concern Not Asked     Special Diet Yes     Comment: watching carb intake     Back Care Not Asked     Exercise No     Comment: some walking     Bike Helmet Not Asked     Seat Belt Not Asked     Self-Exams Not Asked   Social History Narrative     Not on file          MEDICATIONS:  has a current medication list which includes the following prescription(s): apixaban anticoagulant, atorvastatin, carvedilol, clopidogrel, furosemide, gabapentin, insulin aspart, insulin aspart, insulin aspart, insulin glargine, isosorbide mononitrate, levothyroxine, nitroglycerin, pantoprazole, spironolactone, aspirin not prescribed, cadexomer iodine (topical) 0.9%, insulin aspart, insulin pen needle, order for dme, and order for dme.    ROS     ROS: 10 point ROS neg other than the symptoms noted above in the HPI.    Physical Exam   VS: There were no vitals taken for this visit.    LABS:  A1c:  Lab Results   Component Value Date    A1C 9.8 09/15/2020    A1C 10.4 04/17/2020    A1C 8.8 07/06/2019    A1C 9.0 05/05/2019    A1C 11.7 01/05/2019       Creatinine:  Creatinine   Date Value Ref Range Status   08/13/2020 1.13 0.66 - 1.25 mg/dL Final     Urine Micro:  Lab Results   Component Value Date    UMALCR 49.68 03/12/2018      LFTs/Lipids:  Recent Labs   Lab Test 08/13/20  1028 07/08/19  0643  02/02/15  1016 11/14/13  0710   CHOL 107 106   < > 99 103   HDL 45 46   < > 43 30*   LDL 53 43   < > 42 57   TRIG 44 84   < > 68 82   CHOLHDLRATIO  --   --   --  2.3 3.5    < > = values in this interval not displayed.       TFTs:  TSH   Date  Value Ref Range Status   10/09/2019 1.80 0.40 - 4.00 mU/L Final     All pertinent notes, labs, and images personally reviewed by me.     Glucometer/ insulin pump (if applicable)/ CGM data (if applicable) downloaded, Personally reviewed and interpreted.  All Blood sugar data reviewed personally and discussed with pt.  See nursing note from 9/21/2020 for details of BG/CGM log.      A/P  Mr.Ralph MAYURI Elder is a 69 year old here for the evaluation/management of diabetes:    1. DM2 - Under Poor control.  A1c 9.8% in the setting of anemia. DM complicated by CAD, CVA and CKD.Complex cardiac history including multiple MIs, multivessel stenting, ischemic cardiomyopathy, chronic systolic heart failure, chronic atrial fibrillation,   Joey is not covered per his report.  Not able to tolerate metformin.  Is not interested in CDE follow up.  Plan:  Discussed diagnosis, pathophysiology, management and treatment options of condition with pt.  I also discussed importance of strict blood sugar control to prevent complications associated with uncontrolled diabetes.   Novolog: Increase the dose: Take 10 units with breakfast and lunch and 12 units with dinner (as baseline) + Continue current correctional scale- try to take with each meal.  Lantus: increase to 48 units/day  Follow up in 6 weeks  Try to check often- 3 times a day (before each meal and bedtime)    2. Hypertension - On medication      3. Hyperlipidemia - Under Fair control.  On lipitor 40 mg/day. HDL 45 ,LDL 53.    4. Hypothyroidism:  On medication. Managed by PCP.  Not discussed.    5. Prevention:  Opthalmology-   ASA- ON Eliquis and Plavix.  Smoking-     Most Recent Immunizations   Administered Date(s) Administered     Flu, Unspecified 10/21/2008     Influenza (H1N1) 01/29/2010     Influenza (High Dose) 3 valent vaccine 01/06/2020     Influenza (IIV3) PF 11/17/2009     Influenza Vaccine IM > 6 months Valent IIV4 11/14/2013     Pneumo Conj 13-V (2010&after) 06/17/2017      Pneumococcal 23 valent 06/26/2019     TDAP Vaccine (Adacel) 06/23/2017   Deferred Date(s) Deferred     Influenza (High Dose) 3 valent vaccine 03/14/2019     Recommend checking blood sugars before meals and at bedtime.    If Blood glucose are low more often-> 2-3 times/week- give us a call.  The patient is advised to Make better food choices: reduce carbs, Reduce portion size, weight loss and exercise 3-4 times a week.  Discussed hypoglycemia signs and symptoms as well as management in detail.    There is some variability among people, most will usually develop symptoms suggestive of hypoglycemia when blood glucose levels are lowered to the mid 60's. The first set of symptoms are called adrenergic. Patients may experience any of the following nervousness, sweating, intense hunger, trembling, weakness, palpitations, and difficulty speaking.   The acute management of hypoglycemia involves the rapid delivery of a source of easily absorbed sugar. Regular soda, juice, lifesavers, table sugar, are good options. 15 grams of glucose is the dose that is given, followed by an assessment of symptoms and a blood glucose check if possible. If after 10 minutes there is no improvement, another 10-15 grams should be given. This can be repeated up to three times. The equivalency of 10-15 grams of glucose (approximate servings) are: Four lifesavers, 4 teaspoons of sugar, or 1/2 can of regular soda or juice.     All questions were answered.  The patient indicates understanding of the above issues and agrees with the plan set forth.     Follow-up:  3 months.    Sarah Bolivar M.D  Endocrinology  [facility]/[place]  CC: Davion Cordova    Disclaimer: This note consists of symbols derived from keyboarding, dictation and/or voice recognition software. As a result, there may be errors in the script that have gone undetected. Please consider this when interpreting information found in this chart.    Addendum to above  note and clinic visit:    Labs reviewed.    See result note/telephone encounter.

## 2020-09-21 NOTE — LETTER
"    9/21/2020         RE: Jayro Elder  [address] [place] [address]        Dear Colleague,    [facility] you [facility] your [facility]. Please see a copy of my visit note below.    This is a telephone encounter.  Declined video visit.    Start time: 11:04  End time: 11:29    More than 10 min spent reviewing chart, labs, results, imaging studies and in patient communication.    In the setting of COVID-19 outbreak patients visits are transitioned to phone visits/ virtual visits as per system and leadership guidelines.  I have personally reviewed data including notes, lab tests. I have reviewed and interpreted images personally.  This encounter is potentially equivalent to NEW 4 level (03927) clinic visit.    The patient has been notified of following:      \"This telephone visit will be conducted via a call between you and your physician/provider. We have found that certain health care needs can be provided without the need for a physical exam.  This service lets us provide the care you need with a short phone conversation.  If a prescription is necessary we can send it directly to your pharmacy.  If lab work is needed we can place an order for that and you can then stop by our lab to have the test done at a later time.     We will bill your insurance company for this service.  Please check with your medical insurance if this type of visit is covered. You may be responsible for the cost of this type of visit if insurance coverage is denied.  The typical cost is $30 (10min), $59 (11-20min) and $85 (21-30min).  Most often these visits are shorter than 10 minutes. With new updates with corona virus patient might be billed as clinic visit.     If during the course of the call the physician/provider feels a telephone visit is not appropriate, you will not be charged for this service.\"      Past medical history, social history, family history, allergy and " medications were reviewed and updated as appropriate.  Reviewed all pertinent labs, notes and imaging studies as appropriate.    Patient verbally consented to phone visit today: yes.    Disclaimer: This note consists of symbols derived from keyboarding, dictation and/or voice recognition software. As a result, there may be errors in the script that have gone undetected. Please consider this when interpreting information found in this chart.    ROS neg expect noted in notes.  Patient feels well at this time and denies any tachycardia, palpitations, heat intolerance, tremor, insomnia, diarrhea, or unexplained weight loss.  Patient also denies  cold intolerance, constipation, or unexplained weight gain.   ENDOCRINOLOGY CLINIC NOTE:  Name: Jayro Elder  Seen for f/u of Diabetes. Last visit 5/2020.  HPI:  Jayro Elder is a 70 year old male who presents for the evaluation/management of Diabetes.   has a past medical history of CAD (coronary artery disease) (6/29/05), Cancer (H), Cardiomyopathy (H), Cellulitis of left lower extremity (3/13/2018), Cerebral infarction (H), Diabetic ulcer of left midfoot associated with type 2 diabetes mellitus, with fat layer exposed (H) (3/13/2018), Essential hypertension, benign (11/13/2002), Generalized osteoarthrosis, unspecified site (11/13/2002), Microalbuminuria (3/13/2018), Myocardial infarction (H), Neuropathy, Sepsis (H), Sleep apnea, Substance abuse (H), Syncope, unspecified syncope type (3/13/2018), Thyroid disease, Tobacco use disorder (11/13/2002), and Type 2 diabetes mellitus with diabetic peripheral angiopathy without gangrene, unspecified long term insulin use status (2005).    He also has chronic afib (on AC), and known coronary disease with prior MI/stenting and resultant ischemic CMO with chronic EF of 35-40%. H/o CVA per his report. Confirmed on MRI with his neurology team.   Also followed by podiatry gangrene of toe and is s/p left 2nd toe amputation.    Joey  is not covered by insurance.  Followed by podiatry for ulcer follow-up on the left foot.    Is not interested in CDE follow up.      1. Type 2 DM:  Orginally diagnosed at the age of: in 40s. On insulin X 10 years.  Current Regimen:   LANTUS 42 Units in AM. Novolog baseline  8/8/10 Units with breakfast/lunch/dinner respectively + on sliding scale insulin ( but not using it consistently- only check before breakfast). Mostly estimating about 10-14 Units with each meal.    Diabetes Medication(s)     Insulin       insulin aspart (NOVOLOG FLEXPEN) 100 UNIT/ML pen    Inject 8 Units Subcutaneous daily (with lunch)     insulin aspart (NOVOLOG FLEXPEN) 100 UNIT/ML pen    Inject 10 Units Subcutaneous daily (with dinner)     insulin aspart (NOVOLOG PEN) 100 UNIT/ML pen    Inject 8 Units Subcutaneous daily (with breakfast)      insulin glargine (LANTUS PEN) 100 UNIT/ML pen    Inject 42 Units Subcutaneous every morning     insulin aspart (NOVOLOG FLEXPEN) 100 UNIT/ML pen    Inject Subcutaneous 3 times daily (with meals) Sliding Scale  Do not give sliding scale insulin if Pre-Meal BG less than 140.   For Pre-Meal:   - 200 give 2 units.    - 250 give 4 units.    - 300 give 6 units.    - 350 give 8 units.    - 400 give 10 units.        Metformin- Not able to tolerate 2/2 to GI.  Also tried Toujeo but did not feel well on it.    BS checks: once a day (FBG)  Average Meter Download: mostly high in 200s. Limited Bg data available.  No major episodes of hypoglycemia noted/reported.     Exercise: not much.  Last A1c: 9.8%  Symptoms of hypoglycemia (low blood sugar):  Gets symptoms of hypoglycemia.  Episodes of hypoglycemia: rare    DM Complications:   Complications:   Diabetes Complications  Description / Detail    Diabetic Retinopathy   + DR per his report.   Problems with vision after strokes.   CAD / PAD   CAD and CVA.   Neuropathy  + neuropathy and s/p toe amputations   Nephropathy / Microalbuminuria   No  Lab Results   Component Value Date    UMALCR 49.68 03/12/2018         Gastroparesis  No   Hypoglycemia [place]ar[place]  No       2. Hypertension:    On medications.  3. Hyperlipidemia: On medications.    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     Cardiomyopathy (H)      Cellulitis of left lower extremity 3/13/2018     Cerebral infarction (H)     eye sight decreased in peripheral of right side and blurry     Diabetic ulcer of left midfoot associated with type 2 diabetes mellitus, with fat layer exposed (H) 3/13/2018     Essential hypertension, benign 11/13/2002     Generalized osteoarthrosis, unspecified site 11/13/2002     Microalbuminuria 3/13/2018    X1     Myocardial infarction (H)      Neuropathy      Sepsis (H)      Sleep apnea     doesn't use it     Substance abuse (H)      Syncope, unspecified syncope type 3/13/2018     Thyroid disease     takes medicine     Tobacco use disorder 11/13/2002     Type 2 diabetes mellitus with diabetic peripheral angiopathy without gangrene, unspecified long term insulin use status 2005     Past Surgical History:   Procedure Laterality Date     AMPUTATE TOE(S) Right 1/6/2019    Procedure: Right open second toe partial amputation;  Surgeon: Sabino Garcia DPM;  Location: [facility] OR     AMPUTATE TOE(S) Right 1/8/2019    Procedure: 1.  Revision right second toe amputation with resection of distal half of proximal phalanx with full closure.    2.  Debridement of callus/preulcerative lesion, distal left second toe and plantar left first metatarsophalangeal joint.;  Surgeon: Ryan Bhagat DPM;  Location: [facility] OR     AMPUTATE TOE(S) Right 3/12/2019    Procedure: Partial right fourth toe amputation.;  Surgeon: Ryan Bhagat DPM;  Location: [facility] OR     AMPUTATE TOE(S) Right 5/6/2019    Procedure: Right partial third toe  amputation;  Surgeon: Татьяна Mckeon DPM, Podiatry/Foot and Ankle Surgery;  Location:  OR     AMPUTATE TOE(S) Left 4/18/2020    Procedure: LEFT SECOND TOE AMPUTATION;  Surgeon: Ryan Bhagat DPM;  Location:  OR     ANGIOGRAM  6/29/05    subtotal occ.mid LAD,SUSAN mid LAD     ANGIOGRAM  7/05    mild CAD,patent stent,no flow-limiting lesions,sev.LV dysf.LAD enlarged     ANGIOGRAM  2/09    Sev.single vessel disease,Mod LV dysf.distal LAD 90%,70-75% mid lad just before prev stent,SUSAN to prox.mid LAD, endeavor to distal LAD     ANGIOGRAM  11/13/13    restenosis, stent LAD     BACK SURGERY      back surgery x3     C NONSPECIFIC PROCEDURE      Laminectomy x 3 - (1983 x 2 & 1990)     C NONSPECIFIC PROCEDURE Bilateral 1998    Bunionectomy     C NONSPECIFIC PROCEDURE  1959    Gingival surgery at age 9     CV CORONARY ANGIOGRAM N/A 7/8/2019    Procedure: Coronary Angiogram;  Surgeon: Jose Alfredo Crockett MD;  Location:  HEART CARDIAC CATH LAB     CV LEFT HEART CATH N/A 7/8/2019    Procedure: Left Heart Cath;  Surgeon: Jose Alfredo Crockett MD;  Location:  HEART CARDIAC CATH LAB     CV PCI ASPIRATION THROMECTOMY N/A 7/8/2019    Procedure: PCI Aspiration Thrombectomy;  Surgeon: Jose Alfredo Crockett MD;  Location:  HEART CARDIAC CATH LAB     CV PCI STENT DRUG ELUTING N/A 7/8/2019    Procedure: PCI Stent Drug Eluting;  Surgeon: Jose Alfredo Crockett MD;  Location:  HEART CARDIAC CATH LAB     HEART CATH LEFT HEART CATH  06/13/2017    SUSAN to OM3     INCISION AND DRAINAGE TOE, COMBINED Bilateral 9/11/2018    Procedure: COMBINED INCISION AND DRAINAGE TOE;  1) Irrigation and debridement ulcer left great toe  2) Amputation 3rd toe right foot at distal interphalangeal joint level;  Surgeon: Miki Harp MD;  Location:  OR     IRRIGATION AND DEBRIDEMENT FOOT, COMBINED Left 3/12/2019    Procedure: Debridement of left foot ulcer sub first MPJ 2 x 2.5 cm down to and including subcutaneous tissue, callus debridement for  preulcerative lesion, left second toe.;  Surgeon: Ryan Bhagat DPM;  Location: [facility] OR     ORTHOPEDIC SURGERY      bunion surgery both feet     ORTHOPEDIC SURGERY      left shoulder     SOFT TISSUE SURGERY      debridement of toe numerous time     VASCULAR SURGERY      7 stents in heart     Family Hx:  Family History   Problem Relation Age of Onset     Cancer - colorectal Sister      Thyroid Disease Brother      Cardiovascular Brother      Diabetes Brother      Cancer Father         tumor in chest and throat     Arthritis Mother      Thyroid Disease Mother      Diabetes Mother      Glaucoma No family hx of      Macular Degeneration No family hx of        Diabetes: brother with DM    Social Hx:  Social History     Socioeconomic History     Marital status:      Spouse name: Not on file     Number of children: Not on file     Years of education: Not on file     Highest education level: Not on file   Occupational History     Not on file   Social Needs     Financial resource strain: Not on file     Food insecurity     Worry: Not on file     Inability: Not on file     Transportation needs     Medical: Not on file     Non-medical: Not on file   Tobacco Use     Smoking status: Former Smoker     Packs/day: 1.00     Years: 25.00     Pack years: 25.00     Types: Cigarettes     Start date: 1980     Last attempt to quit: 7/25/2005     Years since quitting: 15.1     Smokeless tobacco: Never Used   Substance and Sexual Activity     Alcohol use: Yes     Alcohol/week: 4.0 standard drinks     Types: 4 Shots of liquor per week     Frequency: Never     Comment: OCCASSIONALLY      Drug use: No     Sexual activity: Yes     Partners: Female   Lifestyle     Physical activity     Days per week: Not on file     Minutes per session: Not on file     Stress: Not on file   Relationships     Social connections     Talks on phone: Not on file     Gets together: Not on file     Attends Muslim service: Not on file     Active member  of club or organization: Not on file     Attends meetings of clubs or organizations: Not on file     Relationship status: Not on file     Intimate partner violence     Fear of current or ex partner: Not on file     Emotionally abused: Not on file     Physically abused: Not on file     Forced sexual activity: Not on file   Other Topics Concern     Parent/sibling w/ CABG, MI or angioplasty before 65F 55M? Yes      Service Not Asked     Blood Transfusions Not Asked     Caffeine Concern No     Comment: 3 cups daily     Occupational Exposure Not Asked     Hobby Hazards Not Asked     Sleep Concern Not Asked     Stress Concern Not Asked     Weight Concern Not Asked     Special Diet Yes     Comment: watching carb intake     Back Care Not Asked     Exercise No     Comment: some walking     Bike Helmet Not Asked     Seat Belt Not Asked     Self-Exams Not Asked   Social History Narrative     Not on file          MEDICATIONS:  has a current medication list which includes the following prescription(s): apixaban anticoagulant, atorvastatin, carvedilol, clopidogrel, furosemide, gabapentin, insulin aspart, insulin aspart, insulin aspart, insulin glargine, isosorbide mononitrate, levothyroxine, nitroglycerin, pantoprazole, spironolactone, aspirin not prescribed, cadexomer iodine (topical) 0.9%, insulin aspart, insulin pen needle, order for dme, and order for dme.    ROS     ROS: 10 point ROS neg other than the symptoms noted above in the HPI.    Physical Exam   VS: There were no vitals taken for this visit.    LABS:  A1c:  Lab Results   Component Value Date    A1C 9.8 09/15/2020    A1C 10.4 04/17/2020    A1C 8.8 07/06/2019    A1C 9.0 05/05/2019    A1C 11.7 01/05/2019       Creatinine:  Creatinine   Date Value Ref Range Status   08/13/2020 1.13 0.66 - 1.25 mg/dL Final     Urine Micro:  Lab Results   Component Value Date    UMALCR 49.68 03/12/2018      LFTs/Lipids:  Recent Labs   Lab Test 08/13/20  1028 07/08/19  0643   02/02/15  1016 11/14/13  0710   CHOL 107 106   < > 99 103   HDL 45 46   < > 43 30*   LDL 53 43   < > 42 57   TRIG 44 84   < > 68 82   CHOLHDLRATIO  --   --   --  2.3 3.5    < > = values in this interval not displayed.       TFTs:  TSH   Date Value Ref Range Status   10/09/2019 1.80 0.40 - 4.00 mU/L Final     All pertinent notes, labs, and images personally reviewed by me.     Glucometer/ insulin pump (if applicable)/ CGM data (if applicable) downloaded, Personally reviewed and interpreted.  All Blood sugar data reviewed personally and discussed with pt.  See nursing note from 9/21/2020 for details of BG/CGM log.      A/P  Mr.Ralph MAYURI Elder is a 69 year old here for the evaluation/management of diabetes:    1. DM2 - Under Poor control.  A1c 9.8% in the setting of anemia. DM complicated by CAD, CVA and CKD.Complex cardiac history including multiple MIs, multivessel stenting, ischemic cardiomyopathy, chronic systolic heart failure, chronic atrial fibrillation,   Joey is not covered per his report.  Not able to tolerate metformin.  Is not interested in CDE follow up.  Plan:  Discussed diagnosis, pathophysiology, management and treatment options of condition with pt.  I also discussed importance of strict blood sugar control to prevent complications associated with uncontrolled diabetes.   Novolog: Increase the dose: Take 10 units with breakfast and lunch and 12 units with dinner (as baseline) + Continue current correctional scale- try to take with each meal.  Lantus: increase to 48 units/day  Follow up in 6 weeks  Try to check often- 3 times a day (before each meal and bedtime)    2. Hypertension - On medication      3. Hyperlipidemia - Under Fair control.  On lipitor 40 mg/day. HDL 45 ,LDL 53.    4. Hypothyroidism:  On medication. Managed by PCP.  Not discussed.    5. Prevention:  Opthalmology-   ASA- ON Eliquis and Plavix.  Smoking-     Most Recent Immunizations   Administered Date(s) Administered     Flu,  Unspecified 10/21/2008     Influenza (H1N1) 01/29/2010     Influenza (High Dose) 3 valent vaccine 01/06/2020     Influenza (IIV3) PF 11/17/2009     Influenza Vaccine IM > 6 months Valent IIV4 11/14/2013     Pneumo Conj 13-V (2010&after) 06/17/2017     Pneumococcal 23 valent 06/26/2019     TDAP Vaccine (Adacel) 06/23/2017   Deferred Date(s) Deferred     Influenza (High Dose) 3 valent vaccine 03/14/2019     Recommend checking blood sugars before meals and at bedtime.    If Blood glucose are low more often-> 2-3 times/week- give us a call.  The patient is advised to Make better food choices: reduce carbs, Reduce portion size, weight loss and exercise 3-4 times a week.  Discussed hypoglycemia signs and symptoms as well as management in detail.    There is some variability among people, most will usually develop symptoms suggestive of hypoglycemia when blood glucose levels are lowered to the mid 60's. The first set of symptoms are called adrenergic. Patients may experience any of the following nervousness, sweating, intense hunger, trembling, weakness, palpitations, and difficulty speaking.   The acute management of hypoglycemia involves the rapid delivery of a source of easily absorbed sugar. Regular soda, juice, lifesavers, table sugar, are good options. 15 grams of glucose is the dose that is given, followed by an assessment of symptoms and a blood glucose check if possible. If after 10 minutes there is no improvement, another 10-15 grams should be given. This can be repeated up to three times. The equivalency of 10-15 grams of glucose (approximate servings) are: Four lifesavers, 4 teaspoons of sugar, or 1/2 can of regular soda or juice.     All questions were answered.  The patient indicates understanding of the above issues and agrees with the plan set forth.     Follow-up:  3 months.    Sraah Bolivar M.D  Endocrinology  [facility]  CC: Davion Cordova    Disclaimer: This note consists of  symbols derived from keyboarding, dictation and/or voice recognition software. As a result, there may be errors in the script that have gone undetected. Please consider this when interpreting information found in this chart.    Addendum to above note and clinic visit:    Labs reviewed.    See result note/telephone encounter.          Again, thank you for allowing me to participate in the care of your patient.        Sincerely,        Sarah Bolivar MD

## 2020-09-21 NOTE — PATIENT INSTRUCTIONS
[facility]   Dr Bolivar, Endocrinology Department      [facility]   [address] [address]  Appointment [phone]  Fax: [phone]   Monday and Tuesday         [facility]   [address] [place] [address]  Appointment [phone]  Fax: [phone]  Wednesday and Thursday           Novolog: increase the dose: Take 10 units with breakfast and lunch and 12 units with dinner + continue current correctional scale  Lantus: increase to 48 units/day  Follow up in 6 weeks  Try to check often- 3 times a day (before each meal and bedtime)    Recommend checking blood sugars before meals and at bedtime.    If Blood glucose are low more often-> 2-3 times/week- give us a call.  The patient is advised to Make better food choices: reduce carbs, Reduce portion size, weight loss and exercise 3-4 times a week.  Discussed hypoglycemia signs and symptoms as well as management in detail.

## 2020-09-28 ENCOUNTER — NURSE TRIAGE (OUTPATIENT)
Dept: NURSING | Facility: CLINIC | Age: 70
End: 2020-09-28

## 2020-09-28 ENCOUNTER — RECORDS - HEALTHEAST (OUTPATIENT)
Dept: SCHEDULING | Facility: CLINIC | Age: 70
End: 2020-09-28

## 2020-09-28 NOTE — TELEPHONE ENCOUNTER
"Pt calling    L foot pain, from ankle to \"ball of foot\"  \"It looks infected\"   area is swollen  Area is not red, no red streaks  Some drainage a couple of days ago, soaking has helped stop drainage   Rates pain 4-8  Painful to bare weight , using a boot to stabilize   Pt has had other  ulcers on this foot in the past  Per Pt \" I know when its brewing\"  Pt opts for soonest appointment, scheduled for phone visit on 9/29/2020  Reviewed care advice & when to call back  Pt agrees with plan  Shaina Garcia RN  [facility] Nurse [facility]visors    Additional Information    Negative: Followed a foot injury    Negative: Ankle pain is main symptom    Negative: Thigh or calf pain is main symptom    Negative: Entire foot is cool or blue in comparison to other foot    Negative: Purple or black skin on foot or toe    Negative: [1] Red area or streak AND [2] fever    Negative: [1] Swollen foot AND [2] fever    Negative: Patient sounds very sick or weak to the triager    Negative: [1] SEVERE pain (e.g., excruciating, unable to do any normal activities) AND [2] not improved after 2 hours of pain medicine    Negative: [1] Looks infected (spreading redness, pus) AND [2] large red area (> 2 in. or 5 cm)    Negative: Looks like a boil, infected sore, or deep ulcer    Negative: [1] Redness of the skin AND [2] no fever    [1] Swollen foot AND [2] no fever  (Exceptions: localized bump from bunions, calluses, insect bite, sting)    Diabetes    Negative: Sounds like a life-threatening emergency to the triager    Negative: Caused by an animal bite    Negative: Caused by a human bite    Negative: Foreign body in skin (e.g., splinter, sliver)    Negative: Injury is a puncture wound    Negative: Followed a foot or ankle injury    Negative: SEVERE pain    Negative: Foot is cool or blue in comparison to other side    Negative: [1] Looks infected (spreading redness, red streak, or pus) AND [2] fever    Negative: Patient sounds very sick or weak to the " triager    Negative: Fever > 100.5 F (38.1 C)    Negative: [1] Drainage from foot AND [2] new or increased    Negative: [1] Foul-smelling odor from foot AND [2] new or increased    Negative: [1] Spreading redness or red streak AND [2] area > 2 in. (5 cm)    Negative: [1] Purple or black skin on foot or toe AND [2] new or increased    Negative: Looks like a boil or deep ulcer    Negative: Blood glucose > 400 mg/dl (22 mmol/l)    [1] Ulcer, wound, blister, sore, or red area AND [2] new or increasing    Protocols used: FOOT PAIN-A-AH, DIABETES - FOOT PROBLEMS AND [id number]    COVID 19 Nurse Triage Plan/Patient Instructions    Please be aware that novel coronavirus (COVID-19) may be circulating in the community. If you develop symptoms such as fever, cough, or SOB or if you have concerns about the presence of another infection including coronavirus (COVID-19), please contact your health care provider or visit [email or web address].     Disposition/Instructions    Virtual Visit with provider recommended. Reference Visit Selection Guide.    Thank you for taking steps to prevent the spread of this virus.  o Limit your contact with others.  o Wear a simple mask to cover your cough.  o Wash your hands well and often.    Resources    [facility]: About COVID-19: [email or web address]    CDC: What to Do If You're Sick: www.cdc.gov/coronavirus/2019-ncov/about/steps-when-sick.html    CDC: Ending Home Isolation: www.cdc.gov/coronavirus/2019-ncov/hcp/disposition-in-home-patients.html     CDC: Caring for Someone: www.cdc.gov/coronavirus/2019-ncov/if-you-are-sick/care-for-someone.html     [facility]: Interim Guidance for Hospital Discharge to Home: www.health.[email or web address].[place]/diseases/coronavirus/hcp/hospdischarge.pdf    [facility] clinical trials (COVID-19 research studies): [email or web address]/umn-[email or web address]     Below are the COVID-19 hotlines at the [place] Department of Health ([facility]). Interpreters are  available.   o For health questions: Call 242-461-4484 or 1-608.346.1147 (7 a.m. to 7 p.m.)  o For questions about schools and childcare: Call 812-369-2592 or 1-722.221.2443 (7 a.m. to 7 p.m.)

## 2020-09-30 ENCOUNTER — VIRTUAL VISIT (OUTPATIENT)
Dept: FAMILY MEDICINE | Facility: CLINIC | Age: 70
End: 2020-09-30
Payer: COMMERCIAL

## 2020-09-30 ENCOUNTER — HOSPITAL ENCOUNTER (EMERGENCY)
Facility: CLINIC | Age: 70
Discharge: HOME | End: 2020-09-30
Attending: EMERGENCY MEDICINE | Admitting: EMERGENCY MEDICINE
Payer: COMMERCIAL

## 2020-09-30 ENCOUNTER — APPOINTMENT (OUTPATIENT)
Dept: GENERAL RADIOLOGY | Facility: CLINIC | Age: 70
End: 2020-09-30
Attending: EMERGENCY MEDICINE
Payer: COMMERCIAL

## 2020-09-30 VITALS
TEMPERATURE: 98.2 F | WEIGHT: 262.79 LBS | HEIGHT: 76 IN | BODY MASS INDEX: 32 KG/M2 | HEART RATE: 68 BPM | OXYGEN SATURATION: 99 % | RESPIRATION RATE: 16 BRPM | SYSTOLIC BLOOD PRESSURE: 135 MMHG | DIASTOLIC BLOOD PRESSURE: 84 MMHG

## 2020-09-30 DIAGNOSIS — E11.42 DIABETIC POLYNEUROPATHY ASSOCIATED WITH TYPE 2 DIABETES MELLITUS (H): ICD-10-CM

## 2020-09-30 DIAGNOSIS — S91.332A PENETRATING WOUND OF LEFT FOOT, INITIAL ENCOUNTER: ICD-10-CM

## 2020-09-30 DIAGNOSIS — E11.51 TYPE 2 DIABETES MELLITUS WITH DIABETIC PERIPHERAL ANGIOPATHY WITHOUT GANGRENE, WITH LONG-TERM CURRENT USE OF INSULIN (H): ICD-10-CM

## 2020-09-30 DIAGNOSIS — Z79.4 TYPE 2 DIABETES MELLITUS WITH DIABETIC PERIPHERAL ANGIOPATHY WITHOUT GANGRENE, WITH LONG-TERM CURRENT USE OF INSULIN (H): ICD-10-CM

## 2020-09-30 DIAGNOSIS — E11.621 DIABETIC ULCER OF LEFT MIDFOOT ASSOCIATED WITH TYPE 2 DIABETES MELLITUS, WITH FAT LAYER EXPOSED (H): Primary | ICD-10-CM

## 2020-09-30 DIAGNOSIS — R73.9 HYPERGLYCEMIA: ICD-10-CM

## 2020-09-30 DIAGNOSIS — N28.9 RENAL INSUFFICIENCY: ICD-10-CM

## 2020-09-30 DIAGNOSIS — L97.422 DIABETIC ULCER OF LEFT MIDFOOT ASSOCIATED WITH TYPE 2 DIABETES MELLITUS, WITH FAT LAYER EXPOSED (H): Primary | ICD-10-CM

## 2020-09-30 LAB
ANION GAP SERPL CALCULATED.3IONS-SCNC: 7 MMOL/L (ref 3–14)
BASE EXCESS BLDV CALC-SCNC: 1.6 MMOL/L
BASOPHILS # BLD AUTO: 0 10E9/L (ref 0–0.2)
BASOPHILS NFR BLD AUTO: 0.5 %
BUN SERPL-MCNC: 25 MG/DL (ref 7–30)
CALCIUM SERPL-MCNC: 8.4 MG/DL (ref 8.5–10.1)
CHLORIDE SERPL-SCNC: 101 MMOL/L (ref 94–109)
CO2 SERPL-SCNC: 28 MMOL/L (ref 20–32)
CREAT SERPL-MCNC: 1.45 MG/DL (ref 0.66–1.25)
CRP SERPL-MCNC: <2.9 MG/L (ref 0–8)
DIFFERENTIAL METHOD BLD: ABNORMAL
EOSINOPHIL # BLD AUTO: 0.3 10E9/L (ref 0–0.7)
EOSINOPHIL NFR BLD AUTO: 5.4 %
ERYTHROCYTE [DISTWIDTH] IN BLOOD BY AUTOMATED COUNT: 13.1 % (ref 10–15)
ERYTHROCYTE [SEDIMENTATION RATE] IN BLOOD BY WESTERGREN METHOD: 8 MM/H (ref 0–20)
GFR SERPL CREATININE-BSD FRML MDRD: 48 ML/MIN/{1.73_M2}
GLUCOSE BLDC GLUCOMTR-MCNC: 297 MG/DL (ref 70–99)
GLUCOSE BLDC GLUCOMTR-MCNC: 409 MG/DL (ref 70–99)
GLUCOSE SERPL-MCNC: 355 MG/DL (ref 70–99)
HCO3 BLDV-SCNC: 28 MMOL/L (ref 21–28)
HCT VFR BLD AUTO: 37.7 % (ref 40–53)
HGB BLD-MCNC: 12.3 G/DL (ref 13.3–17.7)
IMM GRANULOCYTES # BLD: 0 10E9/L (ref 0–0.4)
IMM GRANULOCYTES NFR BLD: 0.3 %
LACTATE BLD-SCNC: 1.7 MMOL/L (ref 0.7–2)
LYMPHOCYTES # BLD AUTO: 1.3 10E9/L (ref 0.8–5.3)
LYMPHOCYTES NFR BLD AUTO: 21.6 %
MCH RBC QN AUTO: 29.7 PG (ref 26.5–33)
MCHC RBC AUTO-ENTMCNC: 32.6 G/DL (ref 31.5–36.5)
MCV RBC AUTO: 91 FL (ref 78–100)
MONOCYTES # BLD AUTO: 0.5 10E9/L (ref 0–1.3)
MONOCYTES NFR BLD AUTO: 8.6 %
NEUTROPHILS # BLD AUTO: 3.8 10E9/L (ref 1.6–8.3)
NEUTROPHILS NFR BLD AUTO: 63.6 %
NRBC # BLD AUTO: 0 10*3/UL
NRBC BLD AUTO-RTO: 0 /100
O2/TOTAL GAS SETTING VFR VENT: ABNORMAL %
OXYHGB MFR BLDV: 65 %
PCO2 BLDV: 51 MM HG (ref 40–50)
PH BLDV: 7.35 PH (ref 7.32–7.43)
PLATELET # BLD AUTO: 202 10E9/L (ref 150–450)
PO2 BLDV: 35 MM HG (ref 25–47)
POTASSIUM SERPL-SCNC: 4.1 MMOL/L (ref 3.4–5.3)
RBC # BLD AUTO: 4.14 10E12/L (ref 4.4–5.9)
SODIUM SERPL-SCNC: 136 MMOL/L (ref 133–144)
WBC # BLD AUTO: 6 10E9/L (ref 4–11)

## 2020-09-30 PROCEDURE — 99285 EMERGENCY DEPT VISIT HI MDM: CPT | Mod: 25

## 2020-09-30 PROCEDURE — 80048 BASIC METABOLIC PNL TOTAL CA: CPT | Performed by: EMERGENCY MEDICINE

## 2020-09-30 PROCEDURE — 83605 ASSAY OF LACTIC ACID: CPT | Performed by: EMERGENCY MEDICINE

## 2020-09-30 PROCEDURE — 86140 C-REACTIVE PROTEIN: CPT | Performed by: EMERGENCY MEDICINE

## 2020-09-30 PROCEDURE — 99215 OFFICE O/P EST HI 40 MIN: CPT | Mod: 95 | Performed by: PHYSICIAN ASSISTANT

## 2020-09-30 PROCEDURE — 25000128 H RX IP 250 OP 636: Performed by: EMERGENCY MEDICINE

## 2020-09-30 PROCEDURE — 73630 X-RAY EXAM OF FOOT: CPT | Mod: LT

## 2020-09-30 PROCEDURE — 00000146 ZZHCL STATISTIC GLUCOSE BY METER IP

## 2020-09-30 PROCEDURE — 93005 ELECTROCARDIOGRAM TRACING: CPT

## 2020-09-30 PROCEDURE — 25800030 ZZH RX IP 258 OP 636: Performed by: EMERGENCY MEDICINE

## 2020-09-30 PROCEDURE — 96374 THER/PROPH/DIAG INJ IV PUSH: CPT

## 2020-09-30 PROCEDURE — 82805 BLOOD GASES W/O2 SATURATION: CPT | Performed by: EMERGENCY MEDICINE

## 2020-09-30 PROCEDURE — 85025 COMPLETE CBC W/AUTO DIFF WBC: CPT | Performed by: EMERGENCY MEDICINE

## 2020-09-30 PROCEDURE — 85652 RBC SED RATE AUTOMATED: CPT | Performed by: EMERGENCY MEDICINE

## 2020-09-30 PROCEDURE — 96361 HYDRATE IV INFUSION ADD-ON: CPT

## 2020-09-30 RX ORDER — FENTANYL CITRATE 50 UG/ML
50 INJECTION, SOLUTION INTRAMUSCULAR; INTRAVENOUS ONCE
Status: COMPLETED | OUTPATIENT
Start: 2020-09-30 | End: 2020-09-30

## 2020-09-30 RX ORDER — DOXYCYCLINE 100 MG/1
100 CAPSULE ORAL 2 TIMES DAILY
Qty: 14 CAPSULE | Refills: 0 | Status: SHIPPED | OUTPATIENT
Start: 2020-09-30 | End: 2020-10-07

## 2020-09-30 RX ORDER — CEPHALEXIN 500 MG/1
500 CAPSULE ORAL 4 TIMES DAILY
Qty: 28 CAPSULE | Refills: 0 | Status: SHIPPED | OUTPATIENT
Start: 2020-09-30 | End: 2020-10-07

## 2020-09-30 RX ADMIN — SODIUM CHLORIDE 1000 ML: 9 INJECTION, SOLUTION INTRAVENOUS at 21:26

## 2020-09-30 RX ADMIN — FENTANYL CITRATE 50 MCG: 50 INJECTION, SOLUTION INTRAMUSCULAR; INTRAVENOUS at 21:26

## 2020-09-30 ASSESSMENT — ENCOUNTER SYMPTOMS
DYSURIA: 0
NAUSEA: 0
CHILLS: 0
COUGH: 0
FEVER: 0
COLOR CHANGE: 1
ABDOMINAL PAIN: 0

## 2020-09-30 ASSESSMENT — MIFFLIN-ST. JEOR: SCORE: 2053.5

## 2020-09-30 NOTE — PROGRESS NOTES
"Jayro Elder is a 70 year old male who is being evaluated via a billable telephone visit.      The patient has been notified of following:     \"This telephone visit will be conducted via a call between you and your physician/provider. We have found that certain health care needs can be provided without the need for a physical exam.  This service lets us provide the care you need with a short phone conversation.  If a prescription is necessary we can send it directly to your pharmacy.  If lab work is needed we can place an order for that and you can then stop by our lab to have the test done at a later time.    Telephone visits are billed at different rates depending on your insurance coverage. During this emergency period, for some insurers they may be billed the same as an in-person visit.  Please reach out to your insurance provider with any questions.    If during the course of the call the physician/provider feels a telephone visit is not appropriate, you will not be charged for this service.\"    Patient has given verbal consent for Telephone visit?  Yes    What phone number would you like to be contacted at? [phone]    How would you like to obtain your AVS? Mail a copy    Subjective     Jayro Elder is a 70 year old male who presents via phone visit today for the following health issues:    HPI    Foot wound    Patient is a 68 year old male with a history including hypothyroidism, HTN, CVA, untreated MARISOL, chronic renal dysfunction. Complex cardiovascular history including multiple MIs, multivessel stenting, ischemic cardiomyopathy, chronic systolic heart failure, chronic atrial fibrillation, orthostatic hypotension. Follows with cardiology.    History of DM2 with diabetic polyneuropathy, poorly controlled, follows with endocrinology. He struggles with peripheral wounds from his diabetes. He is followed by podiatry. History of right toe osteomyelitis in 1/2019 and underwent right 2nd toe " amputation. Recurrent infection and osteomyelitis in 3/2019 and underwent additional right 3rd toe amputation. Had right foot cellulitis and toe ulceration and underwent partial right 3rd toe amputation 5/2019. He underwent left 2nd toe amputation in 4/2020.    He has had ongoing issues with ulcer on the left foot at the base of his first MPJ for the past few months. Was hospitalized in July with worsening foot wound and infection. He has been following with podiatry, last visit was on 8/27/20 and left foot ulceration was noted to be healed with very minimal pre-ulcerative callus.     Now reporting recurrent wound in this location (left foot base of first MPJ) with associated pain, redness, drainage for at least the past week. He described mostly reddish drainage but also some yellowish drainage. He overall feels unwell and notes nausea and dizziness. Denies fever. Has done vinegar soaks as directed in the past by podiatry but wound is not improving.      Review of Systems   Constitutional, HEENT, cardiovascular, pulmonary, gi and gu systems are negative, except as otherwise noted.       Objective          Vitals:  No vitals were obtained today due to virtual visit.    healthy, alert and no distress  PSYCH: Alert and oriented times 3; coherent speech, normal   rate and volume, able to articulate logical thoughts, able   to abstract reason, no tangential thoughts, no hallucinations   or delusions  His affect is normal  RESP: No cough, no audible wheezing, able to talk in full sentences  Remainder of exam unable to be completed due to telephone visits          Assessment/Plan:    Assessment & Plan     Diabetic ulcer of left midfoot associated with type 2 diabetes mellitus, with fat layer exposed (H)  Ongoing issue with ulcer on left foot for past few months. Saw podiatry about 1 month ago and wound was healed at that time with minimal pre-ulcerative callus. Now reporting worsening wound with associated redness, pain,  drainage (unclear if purulent drainage - describes mostly reddish but also some yellow drainage). No fever but does report generally feeling unwell with nausea and dizziness. Given history of wound complications, osteomyelitis, s/p multiple toe amputations, advised patient to be seen in the ER immediately for evaluation. He is agreeable to plan and will have his wife bring him to the ER.    Type 2 diabetes mellitus with diabetic peripheral angiopathy without gangrene, with long-term current use of insulin (H)  See above    Diabetic polyneuropathy associated with type 2 diabetes mellitus (H)  See above    Risks and benefits of treatment plan discussed. Patient and/or parent acknowledges and agrees with plan of care, all questions answered.      Return today (on 9/30/2020) for ER.    Ofe Dolan PA-C  [facility]    Phone call duration:  8 minutes

## 2020-10-01 LAB — INTERPRETATION ECG - MUSE: NORMAL

## 2020-10-01 NOTE — ED TRIAGE NOTES
Pt here with c/o L foot infection for past 2-3 days. Hx diabetic ulcers. No cough, fever, SOB, n/v/d. BG in triage 409.

## 2020-10-01 NOTE — ED PROVIDER NOTES
History     Chief Complaint:  Cellulitis    HPI   Jayro Elder is a 70 year old male on Plavix and Eliquis with a history of multiple toe amputations, diabetes, coronary artery disease, hypertension, MI, hyperlipidemia, and Afib who presents with concerns for cellulitis. The patient reports that his wife is concerned with increased redness on his left ankle and a growing black spot on the plantar surface of his left foot over the last two days. He states that the spot drained two days ago with red discharge. He notes increasing tenderness and pain on his left foot that radiates up his leg. He denies any trauma or injury to the area. The patient also reports higher than normal blood sugars, measuring in the 400's, compared to his standard in the 200's. He has been treating his diabetes with Novalog at meal time and Lantus in the morning, with a recent increase in dosage as advised by his physician on 09/21/2020. He denies any cough, nausea, fever, chills. Denies abdominal pain.  Patient reports neuropathy at baseline to his feet though cannot recall specific trauma. Tetanus UTD.    Allergies:  No known drug allergies    Medications:    Eliquis   Coreg  Plavix  Lasix   Gabapentin   Lantus   Imdur   Protonix   Nitrostat   Eliquis     Past Medical History:    Coronary artery disease   Cancer   Cardiomyopathy   Cellulitis  Cerebral Infarction   Hypertension   OA  MI   Neuropathy   Sepsis   Sleep apnea  Type 2 diabetes   Substance abuse   Type 2 diabetes   Hyperlipidemia  Afib      Past Surgical History:    Amputate toes - right second toe partial   Amputate toes - revision second toe, debridement of callus   Amputate toes - patial right fourth toe  Amputate toe - right third toe amputation   Amputate toe left second toe   Angiogram x4  Back surgery x3  Laminectomy   Bunionectomy   Gingival surgery   Coronary angiogram   CV left heart cath   PCI aspiration thrombectomy   PCI Stent drug eluting  I & D toe  I & D foot  "  Left surgery ortho surgery   I & D toes multiple   Vascular - 7 stents   Heart cath left    Family History:    Sister: cancer colorectal   Brother: thyroid cancer, cardiovascular, diabetes   Father: cancer   Mother: arthritis, thyroid disease, diabetes     Social History:  Smoking status: former 1.00 ppd quit 2005  Alcohol use: yes   Drug use: no   The patient presents to the emergency department by personal vehicle     Davion Cordova   Marital Status:   [2]    Review of Systems   Constitutional: Negative for chills and fever.   HENT: Negative for congestion.    Respiratory: Negative for cough.    Gastrointestinal: Negative for abdominal pain and nausea.   Genitourinary: Negative for dysuria.   Skin: Positive for color change.   All other systems reviewed and are negative.    Physical Exam     Patient Vitals for the past 24 hrs:   BP Temp Temp src Pulse Resp SpO2 Height Weight   09/30/20 2120 -- -- -- -- -- 100 % -- --   09/30/20 2115 (!) 141/104 -- -- 78 -- 92 % -- --   09/30/20 2020 (!) 140/87 98.2  F (36.8  C) Oral 85 20 99 % 1.93 m (6' 4\") 119.2 kg (262 lb 12.6 oz)       Physical Exam  Nursing note and vitals reviewed.  Constitutional: Well nourished.   Eyes: Conjunctiva normal.  Pupils are equal, round, and reactive to light.   ENT: Nose normal. Mucous membranes pink and moist.    Neck: Normal range of motion.  CVS: Irregular rhythm. 2/2 DP pulses.   Pulmonary: Lungs clear to auscultation bilaterally.   GI: Abdomen soft. Nontender, nondistended. No rigidity or guarding.    MSK: No calf tenderness or swelling.  L. Plantar foot with 2mm wound with mild black appearance thogh not necrotic appearing. No purulent drainage/warmth/erythema/crepitance. Full ROM L. Ankle actively without pain.  Neuro: Alert. Follows simple commands.  Sensation intact x 4.  Skin: Skin is warm and dry. No rash noted.   Psychiatric: Normal affect.       Emergency Department Course   ECG (21:35:20):  Rate 83 bpm. OH interval " *. QRS duration 142. QT/QTc 422/495. P-R-T axes * -53 -12. Atrial fibrillation. Right bundle branch block. Left anterior fascicular block. Bifascicular block. Cannot rule out inferior infarct (masked by fascicular block). Anteroseptal infarct, age undetermined. Abnormal ECG.  Interpreted at 2135 by Cheryle Laboy DO.    Imaging:  Radiology findings were communicated with the patient who voiced understanding of the findings.    Foot XR G/E 3 Views, left  IMPRESSION: Amputation of the distal end of the second toe. Moderate osteoarthrosis of the first MTP joint. Diffuse bone demineralization. Moderate osteoarthrosis at the navicular cuneiform joints. No erosive changes to suggest osteomyelitis. No cortical   destruction or periostitis. Nonspecific soft tissue swelling medial to the first MTP joint. No soft tissue gas or foreign body evident. Arterial calcifications.  As read by Radiology.    Laboratory:  Laboratory findings were communicated with the patient who voiced understanding of the findings.    Glucose by meter (2025): 409 (H)     BMP: glucose: 355 (H) Creatinine 1.45 (H), GFR Estimate: 48 (L), calcium: 8.4 (L) o/w WNL     Lactic Acid: 1.7    CBC: HGB 12.3 (L) o/w WNL (WBC 6.0, )     Blood gas venous oxyhgb: PCO2: 51 (H) o/w WNL     CRP Inflammation: <2.9    Erythrocte sedimentation rate auto: 8     Glucose by meter (2207): 297 (H)     Glucose by meter (2110): 355 (H)     Interventions:  2126 Fentanyl 50 mcg IV  2126 NS 1L IV Bolus    Emergency Department Course:  Past medical records, nursing notes, and vitals reviewed.  2114: I performed an exam of the patient and obtained history, as documented above.     EKG was obtained and reviewed in the emergency department.    IV inserted and blood drawn.    The patient was sent for a foot XR while in the emergency department, results above.     2228: I rechecked the patient. I reviewed the results with the Patient and answered all related questions prior  to discharge.     Findings and plan explained to the Patient. Patient discharged home with instructions regarding supportive care, medications, and reasons to return. The importance of close follow-up was reviewed. The patient was prescribed Keflex and Vibramycin.   Impression & Plan   Medical Decision Making:  Patient is a 70-year-old male presenting with concerns for foot cellulitis.  He does have a puncture wound to the plantar surface of his foot but denies any known trauma.  There is no evidence to suggest deep soft tissue abscess.  X-ray without focal fracture or dislocation. he has no reproducible ankle tenderness and I doubt septic joint.  Patient is noted to be hyperglycemic today though no evidence of DKA.  Overall his labs are reassuring other than mild renal insufficiency.  His glucose did downtrend after IV fluids.  In light of patient's uptrending blood sugars as well as noted wound to the plantar surface of his foot, I did discuss with patient it is not unreasonable to start antibiotics at this time.  He will be initiated on Bactrim as well as Keflex.  The area was demarcated.  There is no evidence to suggest severe sepsis at this time and patient feels comfortable with plan of care with close outpatient follow-up.  He is instructed to return to the ED for fever, worsening redness, purulent drainage, worsening pain.  Patient declined all analgesia for home I did recommend Tylenol.    Diagnosis:    ICD-10-CM    1. Penetrating wound of left foot, initial encounter  S91.332A    2. Hyperglycemia  R73.9    3. Renal insufficiency  N28.9        Disposition:  Discharged to home.    Discharge Medications:  New Prescriptions    CEPHALEXIN (KEFLEX) 500 MG CAPSULE    Take 1 capsule (500 mg) by mouth 4 times daily for 7 days    DOXYCYCLINE HYCLATE (VIBRAMYCIN) 100 MG CAPSULE    Take 1 capsule (100 mg) by mouth 2 times daily for 7 days     Ignacio Sundlof 9/30/2020   [facility] EMERGENCY  DEPARTMENT  Scribe Disclosure:  I, Mraianna Bryan, am serving as a scribe at 9:14 PM on 9/30/2020 to document services personally performed by Cheryle Laboy DO based on my observations and the provider's statements to me.        Cheryle Laboy,   09/30/20 9034

## 2020-10-08 ENCOUNTER — OFFICE VISIT (OUTPATIENT)
Dept: PODIATRY | Facility: CLINIC | Age: 70
End: 2020-10-08
Payer: COMMERCIAL

## 2020-10-08 VITALS
HEIGHT: 76 IN | DIASTOLIC BLOOD PRESSURE: 80 MMHG | BODY MASS INDEX: 32 KG/M2 | WEIGHT: 262.79 LBS | SYSTOLIC BLOOD PRESSURE: 124 MMHG

## 2020-10-08 DIAGNOSIS — Z89.421 H/O AMPUTATION OF LESSER TOE, RIGHT (H): ICD-10-CM

## 2020-10-08 DIAGNOSIS — M20.42 HAMMERTOES OF BOTH FEET: ICD-10-CM

## 2020-10-08 DIAGNOSIS — L84 PRE-ULCERATIVE CALLUSES: ICD-10-CM

## 2020-10-08 DIAGNOSIS — L97.521 ULCER OF LEFT FOOT, LIMITED TO BREAKDOWN OF SKIN (H): ICD-10-CM

## 2020-10-08 DIAGNOSIS — M20.41 HAMMERTOES OF BOTH FEET: ICD-10-CM

## 2020-10-08 DIAGNOSIS — E11.42 DIABETIC POLYNEUROPATHY ASSOCIATED WITH TYPE 2 DIABETES MELLITUS (H): Primary | ICD-10-CM

## 2020-10-08 PROCEDURE — 11042 DBRDMT SUBQ TIS 1ST 20SQCM/<: CPT | Performed by: PODIATRIST

## 2020-10-08 PROCEDURE — 99213 OFFICE O/P EST LOW 20 MIN: CPT | Mod: 25 | Performed by: PODIATRIST

## 2020-10-08 ASSESSMENT — MIFFLIN-ST. JEOR: SCORE: 2053.5

## 2020-10-08 NOTE — PROGRESS NOTES
"Podiatry / Foot and Ankle Surgery Progress Note    October 8, 2020    Subject: Patient was seen for follow up on pain to the left foot.  He notes that he was in the emergency department about a week ago and was given antibiotics.  He notes that he had some drainage coming from his left foot where his wound was.  Denies injury to the area.  He denies fever, nausea.  Currently pain is intermittent and can be 6 out of 10 is worse.    Objective:  Vitals: /80   Ht 1.93 m (6' 4\")   Wt 119.2 kg (262 lb 12.6 oz)   BMI 31.99 kg/m    BMI= Body mass index is 31.99 kg/m .    A1C: 9.8 (9/2020)     General:  Patient is alert and orientated.  NAD     Vascular:  DP and PT pulses are palpable.  No varicosities noted  CFT's < 3secs.  Skin temp is normal     Neuro:  Light and gross touch sensation absent to feet.      Derm: Thickened pre-ulcerative hyperkeratotic lesion to the plantar aspect of the left first metatarsal head.  Upon debridement there is a small partial-thickness ulcer with the fat layer exposed measuring 0.4 x 0.4 x 0.1 cm.  No surrounding erythema purulent drainage or malodor noted..        Musculoskeletal: multiple previous toe amputations right foot.  Left second toe amputated previously.     Assessment:      Diabetic polyneuropathy associated with type 2 diabetes mellitus (H)  Pre-ulcerative calluses  H/O amputation of lesser toe, right (H)  Hammertoes of both feet  Ulcer of left foot, limited to breakdown of skin (H)       Plan: At this time, the wound was debrided.  We will have him apply iodine and a bandage to the area daily.  He will wear his offloading shoe at all times even around the house.  He can get the foot wet and dry it well and then apply the iodine to the area.  We will have him follow-up in 1 month for reassessment.  Clinically there are no signs of acute infection so would hold off on reordering an antibiotic at this time.    Procedure: After verbal consent, excisional debridement was " performed on ulcer.  #15 blade was used to debride ulcer down to and including subcutaneous tissue. Bleeding controlled with light pressure.   No drainage noted.  No anesthesia was used due to neuropathy. Dry dressing applied to foot.  Patient tolerated procedure well.      Татьяна Mckeon DPM, Podiatry/Foot and Ankle Surgery    Weight management plan: Patient was referred to their PCP to discuss a diet and exercise plan.

## 2020-10-08 NOTE — LETTER
"    10/8/2020         RE: Jayro Elder  [address] [place] [address]        Dear Colleague,    Thank you for referring your patient, Jayro Elder, to the [facility] PODIATRY. Please see a copy of my visit note below.    Podiatry / Foot and Ankle Surgery Progress Note    October 8, 2020    Subject: Patient was seen for follow up on pain to the left foot.  He notes that he was in the emergency department about a week ago and was given antibiotics.  He notes that he had some drainage coming from his left foot where his wound was.  Denies injury to the area.  He denies fever, nausea.  Currently pain is intermittent and can be 6 out of 10 is worse.    Objective:  Vitals: /80   Ht 1.93 m (6' 4\")   Wt 119.2 kg (262 lb 12.6 oz)   BMI 31.99 kg/m    BMI= Body mass index is 31.99 kg/m .    A1C: 9.8 (9/2020)     General:  Patient is alert and orientated.  NAD     Vascular:  DP and PT pulses are palpable.  No varicosities noted  CFT's < 3secs.  Skin temp is normal     Neuro:  Light and gross touch sensation absent to feet.      Derm: Thickened pre-ulcerative hyperkeratotic lesion to the plantar aspect of the left first metatarsal head.  Upon debridement there is a small partial-thickness ulcer with the fat layer exposed measuring 0.4 x 0.4 x 0.1 cm.  No surrounding erythema purulent drainage or malodor noted..        Musculoskeletal: multiple previous toe amputations right foot.  Left second toe amputated previously.     Assessment:      Diabetic polyneuropathy associated with type 2 diabetes mellitus (H)  Pre-ulcerative calluses  H/O amputation of lesser toe, right (H)  Hammertoes of both feet  Ulcer of left foot, limited to breakdown of skin (H)       Plan: At this time, the wound was debrided.  We will have him apply iodine and a bandage to the area daily.  He will wear his offloading shoe at all times even around the house.  He can get the foot wet and dry it well " and then apply the iodine to the area.  We will have him follow-up in 1 month for reassessment.  Clinically there are no signs of acute infection so would hold off on reordering an antibiotic at this time.    Procedure: After verbal consent, excisional debridement was performed on ulcer.  #15 blade was used to debride ulcer down to and including subcutaneous tissue. Bleeding controlled with light pressure.   No drainage noted.  No anesthesia was used due to neuropathy. Dry dressing applied to foot.  Patient tolerated procedure well.      Татьяна Mckeon DPM, Podiatry/Foot and Ankle Surgery    Weight management plan: Patient was referred to their PCP to discuss a diet and exercise plan.        Again, thank you for allowing me to participate in the care of your patient.        Sincerely,        Татьяна Mckeon DPM, Podiatry/Foot and Ankle Surgery

## 2020-10-15 DIAGNOSIS — I10 ESSENTIAL HYPERTENSION: Primary | ICD-10-CM

## 2020-10-15 RX ORDER — CARVEDILOL 25 MG/1
25 TABLET ORAL 2 TIMES DAILY WITH MEALS
Qty: 180 TABLET | Refills: 3 | Status: SHIPPED | OUTPATIENT
Start: 2020-10-15 | End: 2021-11-10

## 2020-10-26 DIAGNOSIS — I20.0 UNSTABLE ANGINA (H): ICD-10-CM

## 2020-10-26 RX ORDER — CLOPIDOGREL BISULFATE 75 MG/1
75 TABLET ORAL DAILY
Qty: 90 TABLET | Refills: 3 | Status: SHIPPED | OUTPATIENT
Start: 2020-10-26 | End: 2021-11-10

## 2020-11-03 DIAGNOSIS — I20.0 UNSTABLE ANGINA (H): ICD-10-CM

## 2020-11-03 RX ORDER — FUROSEMIDE 40 MG
40 TABLET ORAL DAILY
Qty: 90 TABLET | Refills: 0 | Status: ON HOLD | OUTPATIENT
Start: 2020-11-03 | End: 2021-01-04

## 2020-11-10 ENCOUNTER — OFFICE VISIT (OUTPATIENT)
Dept: PODIATRY | Facility: CLINIC | Age: 70
End: 2020-11-10
Payer: COMMERCIAL

## 2020-11-10 VITALS
SYSTOLIC BLOOD PRESSURE: 136 MMHG | WEIGHT: 262 LBS | BODY MASS INDEX: 31.9 KG/M2 | HEIGHT: 76 IN | DIASTOLIC BLOOD PRESSURE: 80 MMHG

## 2020-11-10 DIAGNOSIS — M20.42 HAMMERTOE, BILATERAL: ICD-10-CM

## 2020-11-10 DIAGNOSIS — L97.522 DIABETIC ULCER OF OTHER PART OF LEFT FOOT ASSOCIATED WITH TYPE 2 DIABETES MELLITUS, WITH FAT LAYER EXPOSED (H): ICD-10-CM

## 2020-11-10 DIAGNOSIS — Z89.421 H/O AMPUTATION OF LESSER TOE, RIGHT (H): ICD-10-CM

## 2020-11-10 DIAGNOSIS — E11.621 DIABETIC ULCER OF OTHER PART OF LEFT FOOT ASSOCIATED WITH TYPE 2 DIABETES MELLITUS, WITH FAT LAYER EXPOSED (H): ICD-10-CM

## 2020-11-10 DIAGNOSIS — M20.41 HAMMERTOE, BILATERAL: ICD-10-CM

## 2020-11-10 DIAGNOSIS — E11.42 DIABETIC POLYNEUROPATHY ASSOCIATED WITH TYPE 2 DIABETES MELLITUS (H): Primary | ICD-10-CM

## 2020-11-10 PROCEDURE — 99212 OFFICE O/P EST SF 10 MIN: CPT | Mod: 25 | Performed by: PODIATRIST

## 2020-11-10 PROCEDURE — 11042 DBRDMT SUBQ TIS 1ST 20SQCM/<: CPT | Performed by: PODIATRIST

## 2020-11-10 ASSESSMENT — MIFFLIN-ST. JEOR: SCORE: 2049.92

## 2020-11-10 NOTE — PATIENT INSTRUCTIONS
Thank you for choosing [facility] Podiatry / Foot & Ankle Surgery!    [place] [facility] [place] SCHEDULE SURGERY: [phone] [address] BILLING QUESTIONS: [phone]   [place] [address] AFTER HOURS: [phone]   PH: [phone] [employer] LINE:[phone]   FAX: [phone] APPOINTMENTS: [phone]     Follow up: 1 month

## 2020-11-10 NOTE — PROGRESS NOTES
"Podiatry / Foot and Ankle Surgery Progress Note    November 10, 2020    Subject: Patient was seen for follow up on recurrent left foot ulcer.  Notes has been draining quite a bit more.  Denies fever, nausea, vomiting.  Notes he has pain but normally only at night is when it is the worst.    Objective:  Vitals: /80   Ht 1.93 m (6' 4\")   Wt 118.8 kg (262 lb)   BMI 31.89 kg/m      A1C: 9.8 (9/2020)     General:  Patient is alert and orientated.  NAD     Vascular:  DP and PT pulses are palpable.  No varicosities noted  CFT's < 3secs.  Skin temp is normal     Neuro:  Light and gross touch sensation absent to feet.      Derm: Thickened pre-ulcerative hyperkeratotic lesion to the plantar aspect of the left first metatarsal head.  Upon debridement there is a small partial-thickness ulcer with the fat layer exposed measuring 4.0 cm x 3.0 cm x 0.3 cm..  No surrounding erythema purulent drainage or malodor noted..   Heavy clear serous drainage is noted with maceration around the ulcer.  No acute signs of infection noted     Musculoskeletal: multiple previous toe amputations right foot.  Left second toe amputated previously.     Assessment:      Diabetic polyneuropathy associated with type 2 diabetes mellitus (H)  Pre-ulcerative calluses  H/O amputation of lesser toe, right (H)  Hammertoes of both feet  Ulcer of left foot, limited to breakdown of skin (H)        Plan: At this time, the wound was debrided.    We will also switch to an [place] foam to try to help control the drainage to the area to help with healing.  We will order this through [employer].  He will continue the Silvadene cream until he gets the foam and once he has that we will only use the foam daily.  We will have him follow-up in 1 month.  He will continue wearing his offloading shoe.  He was told to call further questions or concerns.     Procedure: After verbal consent, excisional debridement was performed on ulcer.  #15 blade was used to debride " ulcer down to and including subcutaneous tissue. Bleeding controlled with light pressure.   No drainage noted.  No anesthesia was used due to neuropathy. Dry dressing applied to foot.  Patient tolerated procedure well.      Татьяна Mckeon DPM, Podiatry/Foot and Ankle Surgery    Weight management plan: Patient was referred to their PCP to discuss a diet and exercise plan.    Recommended to Jayro Elder to follow up with Primary Care provider regarding elevated blood pressure.

## 2020-11-10 NOTE — LETTER
"    11/10/2020         RE: Jayro Elder  [address] [place] [address]        Dear Colleague,    Thank you for referring your patient, Jayro Elder, to the [facility] PODIATRY. Please see a copy of my visit note below.    Podiatry / Foot and Ankle Surgery Progress Note    November 10, 2020    Subject: Patient was seen for follow up on recurrent left foot ulcer.  Notes has been draining quite a bit more.  Denies fever, nausea, vomiting.  Notes he has pain but normally only at night is when it is the worst.    Objective:  Vitals: /80   Ht 1.93 m (6' 4\")   Wt 118.8 kg (262 lb)   BMI 31.89 kg/m      A1C: 9.8 (9/2020)     General:  Patient is alert and orientated.  NAD     Vascular:  DP and PT pulses are palpable.  No varicosities noted  CFT's < 3secs.  Skin temp is normal     Neuro:  Light and gross touch sensation absent to feet.      Derm: Thickened pre-ulcerative hyperkeratotic lesion to the plantar aspect of the left first metatarsal head.  Upon debridement there is a small partial-thickness ulcer with the fat layer exposed measuring 4.0 cm x 3.0 cm x 0.3 cm..  No surrounding erythema purulent drainage or malodor noted..   Heavy clear serous drainage is noted with maceration around the ulcer.  No acute signs of infection noted     Musculoskeletal: multiple previous toe amputations right foot.  Left second toe amputated previously.     Assessment:      Diabetic polyneuropathy associated with type 2 diabetes mellitus (H)  Pre-ulcerative calluses  H/O amputation of lesser toe, right (H)  Hammertoes of both feet  Ulcer of left foot, limited to breakdown of skin (H)        Plan: At this time, the wound was debrided.    We will also switch to an [place] foam to try to help control the drainage to the area to help with healing.  We will order this through [employer].  He will continue the Silvadene cream until he gets the foam and once he has that we will only use " the foam daily.  We will have him follow-up in 1 month.  He will continue wearing his offloading shoe.  He was told to call further questions or concerns.     Procedure: After verbal consent, excisional debridement was performed on ulcer.  #15 blade was used to debride ulcer down to and including subcutaneous tissue. Bleeding controlled with light pressure.   No drainage noted.  No anesthesia was used due to neuropathy. Dry dressing applied to foot.  Patient tolerated procedure well.      Татьяна Mckeon DPM, Podiatry/Foot and Ankle Surgery    Weight management plan: Patient was referred to their PCP to discuss a diet and exercise plan.    Recommended to Jayro Elder to follow up with Primary Care provider regarding elevated blood pressure.        Again, thank you for allowing me to participate in the care of your patient.        Sincerely,        Татьяна Mckeon DPM, Podiatry/Foot and Ankle Surgery

## 2020-11-11 ENCOUNTER — TELEPHONE (OUTPATIENT)
Dept: ENDOCRINOLOGY | Facility: CLINIC | Age: 70
End: 2020-11-11

## 2020-11-11 NOTE — TELEPHONE ENCOUNTER
DMV DM form    LAST OFFICE/VIRTUAL VISIT:  09/21/20    FUTURE OFFICE/VIRTUAL VISIT:  None    Lab Results   Component Value Date    A1C 9.8 09/15/2020    A1C 10.4 04/17/2020    A1C 8.8 07/06/2019    A1C 9.0 05/05/2019    A1C 11.7 01/05/2019         Iza Chow CMA  [facility]  [place]

## 2020-11-11 NOTE — TELEPHONE ENCOUNTER
Form received from patient at the  for [place] [employer] - Insulin Diabetic Form for review and signature.  Put in Dr. Valdez's in basket.

## 2020-11-12 NOTE — TELEPHONE ENCOUNTER
Forms/paperwork reviewed, completed and signed.  Please fax or send the papers as requested, document in chart and close the encounter.    Thank you.    Sarah Bolivar MD

## 2020-11-12 NOTE — TELEPHONE ENCOUNTER
Form was faxed, mailed to pt and sent to scanning.      Iza Chow, [facility]  [place]

## 2020-11-19 DIAGNOSIS — I50.22 CHRONIC SYSTOLIC CONGESTIVE HEART FAILURE (H): Primary | ICD-10-CM

## 2020-11-19 RX ORDER — SPIRONOLACTONE 25 MG/1
25 TABLET ORAL DAILY
Qty: 90 TABLET | Refills: 0 | Status: ON HOLD | OUTPATIENT
Start: 2020-11-19 | End: 2021-01-04

## 2020-11-19 NOTE — TELEPHONE ENCOUNTER
Received call from pt requesting refill of spironolactone 25 mg daily. Advised pt he is due for CORE clinic follow up, message sent to scheduling requesting they contact pt to schedule. Rx sent for 90 day supply and 0 refills.

## 2020-11-25 DIAGNOSIS — I50.22 CHRONIC SYSTOLIC CONGESTIVE HEART FAILURE (H): ICD-10-CM

## 2020-11-25 LAB
ANION GAP SERPL CALCULATED.3IONS-SCNC: 3 MMOL/L (ref 3–14)
BUN SERPL-MCNC: 19 MG/DL (ref 7–30)
CALCIUM SERPL-MCNC: 8.6 MG/DL (ref 8.5–10.1)
CHLORIDE SERPL-SCNC: 101 MMOL/L (ref 94–109)
CO2 SERPL-SCNC: 29 MMOL/L (ref 20–32)
CREAT SERPL-MCNC: 1.19 MG/DL (ref 0.66–1.25)
GFR SERPL CREATININE-BSD FRML MDRD: 61 ML/MIN/{1.73_M2}
GLUCOSE SERPL-MCNC: 419 MG/DL (ref 70–99)
POTASSIUM SERPL-SCNC: 4.5 MMOL/L (ref 3.4–5.3)
SODIUM SERPL-SCNC: 133 MMOL/L (ref 133–144)

## 2020-11-25 PROCEDURE — 80048 BASIC METABOLIC PNL TOTAL CA: CPT | Performed by: INTERNAL MEDICINE

## 2020-11-25 PROCEDURE — 36415 COLL VENOUS BLD VENIPUNCTURE: CPT | Performed by: INTERNAL MEDICINE

## 2020-11-30 ENCOUNTER — OFFICE VISIT (OUTPATIENT)
Dept: CARDIOLOGY | Facility: CLINIC | Age: 70
End: 2020-11-30
Attending: INTERNAL MEDICINE
Payer: COMMERCIAL

## 2020-11-30 VITALS
SYSTOLIC BLOOD PRESSURE: 110 MMHG | HEIGHT: 76 IN | BODY MASS INDEX: 31.45 KG/M2 | WEIGHT: 258.3 LBS | DIASTOLIC BLOOD PRESSURE: 71 MMHG | HEART RATE: 73 BPM

## 2020-11-30 DIAGNOSIS — I50.22 CHRONIC SYSTOLIC CONGESTIVE HEART FAILURE (H): ICD-10-CM

## 2020-11-30 DIAGNOSIS — I95.1 ORTHOSTATIC HYPOTENSION: ICD-10-CM

## 2020-11-30 DIAGNOSIS — I25.119 CORONARY ARTERY DISEASE INVOLVING NATIVE CORONARY ARTERY OF NATIVE HEART WITH ANGINA PECTORIS (H): ICD-10-CM

## 2020-11-30 PROCEDURE — 99214 OFFICE O/P EST MOD 30 MIN: CPT | Performed by: PHYSICIAN ASSISTANT

## 2020-11-30 ASSESSMENT — MIFFLIN-ST. JEOR: SCORE: 2033.14

## 2020-11-30 NOTE — PATIENT INSTRUCTIONS
Visit Summary:    Today we discussed:   No medication changes today.    Labs:  Results for PORTER YANCEY (MRN [id number]) as of 11/30/2020 16:03   Ref. Range 11/25/2020 14:58   Sodium Latest Ref Range: 133 - 144 mmol/L 133   Potassium Latest Ref Range: 3.4 - 5.3 mmol/L 4.5   Chloride Latest Ref Range: 94 - 109 mmol/L 101   Carbon Dioxide Latest Ref Range: 20 - 32 mmol/L 29   Urea Nitrogen Latest Ref Range: 7 - 30 mg/dL 19   Creatinine Latest Ref Range: 0.66 - 1.25 mg/dL 1.19   GFR Estimate Latest Ref Range: >60 mL/min/1.73_m2 61   GFR Estimate If Black Latest Ref Range: >60 mL/min/1.73_m2 71   Calcium Latest Ref Range: 8.5 - 10.1 mg/dL 8.6   Anion Gap Latest Ref Range: 3 - 14 mmol/L 3       Follow up:   With cardiologist in 3 months.     Please call my nurse Miesha at [phone] with any questions or concerns.

## 2020-11-30 NOTE — LETTER
11/30/2020    Davion Cordova PA-C  [id number] [facility] [id number]    RE: Jayro Jainlala       Dear Colleague,    I had the pleasure of seeing Jayro Elder in the [facility].      Cardiology Progress Note    Date of Service: 12/01/2020      Reason for visit: CORE clinic follow up, chronic systolic heart failure, ischemic cardiomyopathy.    Primary cardiologist: Dr. Justin Tomlin      HPI:  Mr. Elder is a 70 year old male with a PMHx including poorly controlled DM2, hypothyroidism, hypertension, CVA, untreated MARISOL, and chronic renal insufficiency. He also has longstanding afib (on AC), and known coronary disease with prior MI/stenting and resultant ischemic CMO with chronic EF of 35-40%. He also struggles with peripheral wounds from his diabetes. In Jan 2019 he was admitted for osteomyelitis and his stay was complicated by ABNER with IV antibiotics and his lopidogrel was stopped temporarily at that time. He presented back shortly after, with weight gain and ANDREWS requiring a hospitalizing for CHF exacerbation. Echo showed no new WMAs and his EF remained in the 35-40% range.    He was then again hospitalized in March 2019 for right foot cellulitis and right toe osteomyelitis. He got IV abx and underwent partial toe amputation as well as I+D of his left foot ulcer. During that stay, he did not have an exacerbation of his heart failure, and his atrial fibrillation remained under good control. He returned in July 2019 for chest pain, and underwent SUSAN x 3 to the RCA. Echo showed EF remained 35-40% without significant changes. He returned a few weeks later with atypical chest pain, and medical management was recommended as repeat stress test did not show any new ischemia.      More recently, in March of 2020, he called to report low BPs at home with orthostasis (as low as 80/60). I temporarily held most of his cardiac medications, and shortly after he required increase in  "Lasix due to leg swelling. As he endorsed some new chest tightness and \"just not feeling good,\" we performed another stress test in mid April which showed EF 37% with akinesis of mid to distal anterior, anteroseptal and apical walls. There was also a medium medium sized area of a severe degree of infarction in the mid to distal anterior, apical and anteroseptal segment(s) of the left ventricle.  There was no ischemia is identified, and notably, this was not significantly changed when compared to July of 2019. Ultimately, shortly after, he was again found to have a gangrene toe and underwent an amputation. We then slowly reintroduced his cardiac medications back.     Jayro was last seen via virtual visit by Dr. Tomlin in mid August of 2020. At that time, no changes were made but he asked him to continue to monitor his ongoing orthostasis symptoms. Today, we are doing an in clinic visit. He tells me that overall, he has been feeling about the same. He continues to endorse shortness of breath and dizziness, often when he bends over and stands back up. It does not sound like this has worsened any. He has palpitations as well, but this also sounds similar to prior. He reports BP is still low at home at times, but here today is acceptable at 110/71. His weight continues to fluctuate 4-5 lbs at home, and here today is down slightly at 258 lbs. His hands and feet continue to bother him due to neuropathy and his blood sugars remain under poor control, he tells me mostly >200s. He has not been hospitalized or had any further infections since his last visit. He denies any abnormal bleeding on the plavix/Eliquis combo. He has ongoing intermittent chest discomfort, but says often this improves after he belches, but sometimes takes awhile for it to fully go away. This does not sound to be exertional in nature.     His labs done last week showed continued preserved renal function and electrolytes.        Assessment/Plan:     1. " Coronary artery disease.              --Long cardiac hx including MI in 2005 with proximal LAD stenting, repeat LAD stenting in 2009 and distal LAD stenting in 2013. In June 2017 he had stent placement to an obtuse marginal. In July 2019, he was found to have progression of his disease and underwent SUSAN x 3 to the RCA. He returned a few weeks later with atypical CP in later July 2019 and Lexiscan did not show new ischemia. Thus, we have been medically managing him.    --He continues with intermittent chest discomfort, though often this improves with belching. He had a repeat Lexiscan in April 2020 which was abnormal, but overall unchanged with no new ischemia. Per Dr. Tomlin, due to his multiple overlapping stents, he would prefer to keep him on the clopidogrel as well as his anticoagulation as he is tolerating this without issue.              --Continue Imdur, 60mg daily.                --Continue atorvastatin 40mg at bedtime. Last LDL within goal at 53 in Aug 2020.       2.  Chronic systolic heart failure, known ischemic cardiomyopathy.              --Known chronic EF 35-40%, which remained unchanged per last echo July 2019 when he returned with angina, requiring repeat intervention.               --He has struggled with symptomatic orthostatic hypotension, which is suspected due to an element of autonomic insufficiency from his difficult diabetes control. We had to temporarily hold his cardiac medications this summer in the setting of a gangrenous toe, but this also resulted in some issues with his CHF. Currently, he is back on carvedilol and spironolactone, but it appears that his lisinopril was stopped over the summer also due to BP issues. However, this may be reconsidered again in the future for renal protection in the setting of his DM. His renal function is acceptable currently.              --No change to Lasix at 40mg daily. He appears overall euvolemic on exam today.     3. Chronic atrial  fibrillation.              --Appears rate controlled, though does have chronic, intermittent palpitations. Holter performed in April 2020 showed average HR 88 with chronic afib. I am hesitant to decrease his carvedilol for now, but if hypotension becomes an issue again, this could be a consideration.              --Continue Eliquis 5mg BID. No new bleeding issues.           Follow up plan: Return in ~3 months to establish with a new cardiologist, with labs prior.       Orders this Visit:  Orders Placed This Encounter   Procedures     Basic metabolic panel     N terminal pro BNP outpatient     CBC with platelets     Follow-Up with Cardiologist     No orders of the defined types were placed in this encounter.    There are no discontinued medications.      CURRENT MEDICATIONS:  Current Outpatient Medications   Medication Sig Dispense Refill     apixaban ANTICOAGULANT (ELIQUIS) 5 MG tablet Take 1 tablet (5 mg) by mouth 2 times daily       atorvastatin (LIPITOR) 40 MG tablet Take 1 tablet (40 mg) by mouth every evening 90 tablet 3     Cadexomer Iodine, topical, 0.9% (IODOSORB) 0.9 % GEL gel Apply to ulcer on left foot every other day with gauze dressing. 40 g 0     carvedilol (COREG) 25 MG tablet Take 1 tablet (25 mg) by mouth 2 times daily (with meals) 180 tablet 3     clopidogrel (PLAVIX) 75 MG tablet Take 1 tablet (75 mg) by mouth daily 90 tablet 3     furosemide (LASIX) 40 MG tablet Take 1 tablet (40 mg) by mouth daily 90 tablet 0     gabapentin (NEURONTIN) 300 MG capsule Take 2-3 capsules (600-900 mg) by mouth At Bedtime 180 capsule 1     insulin aspart (NOVOLOG FLEXPEN) 100 UNIT/ML pen Take 10 units with breakfast and lunch and 12 units with dinner + correctional scale 30 mL 1     insulin aspart (NOVOLOG FLEXPEN) 100 UNIT/ML pen Inject Subcutaneous 3 times daily (with meals) Sliding Scale  Do not give sliding scale insulin if Pre-Meal BG less than 140.   For Pre-Meal:   - 200 give 2 units.    - 250 give  4 units.    - 300 give 6 units.    - 350 give 8 units.    - 400 give 10 units.       insulin glargine (LANTUS PEN) 100 UNIT/ML pen Inject 48 Units Subcutaneous every morning 30 mL 1     insulin pen needle (31G X 6 MM) 31G X 6 MM miscellaneous Use  as directed. 100 each 11     isosorbide mononitrate (IMDUR) 60 MG 24 hr tablet Take 1 tablet (60 mg) by mouth daily 90 tablet 3     levothyroxine (SYNTHROID, LEVOTHROID) 137 MCG tablet Take 137 mcg by mouth every evening  90 tablet 3     nitroglycerin (NITROSTAT) 0.4 MG sublingual tablet Place 1 tablet (0.4 mg) under the tongue every 5 minutes as needed for chest pain 50 tablet 0     order for DME Equipment being ordered: size 12 surgical shoe 1 Device 0     order for DME Equipment being ordered: Walker Wheels () and Walker ()  Treatment Diagnosis: Impaired gait, heel WB bilaterally. 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     Cardiomyopathy (H)      Cellulitis of left lower extremity 3/13/2018     Cerebral infarction (H)     eye sight decreased in peripheral of right side and blurry     Diabetic ulcer of left midfoot associated with type 2 diabetes mellitus, with fat layer exposed (H) 3/13/2018     Essential hypertension, benign 11/13/2002     Generalized osteoarthrosis, unspecified site 11/13/2002     Microalbuminuria 3/13/2018    X1     Myocardial infarction (H)      Neuropathy      Sepsis (H)      Sleep apnea     doesn't use it     Substance abuse (H)      Syncope, unspecified  syncope type 3/13/2018     Thyroid disease     takes medicine     Tobacco use disorder 11/13/2002     Type 2 diabetes mellitus with diabetic peripheral angiopathy without gangrene, unspecified long term insulin use status 2005       PAST SURGICAL HISTORY:  Past Surgical History:   Procedure Laterality Date     AMPUTATE TOE(S) Right 1/6/2019    Procedure: Right open second toe partial amputation;  Surgeon: Sabino Garcia DPM;  Location: [facility] OR     AMPUTATE TOE(S) Right 1/8/2019    Procedure: 1.  Revision right second toe amputation with resection of distal half of proximal phalanx with full closure.    2.  Debridement of callus/preulcerative lesion, distal left second toe and plantar left first metatarsophalangeal joint.;  Surgeon: Ryan Bhagat DPM;  Location: [facility] OR     AMPUTATE TOE(S) Right 3/12/2019    Procedure: Partial right fourth toe amputation.;  Surgeon: Ryan Bhagat DPM;  Location: [facility] OR     AMPUTATE TOE(S) Right 5/6/2019    Procedure: Right partial third toe amputation;  Surgeon: Татьяна Mckeon DPM, Podiatry/Foot and Ankle Surgery;  Location: [facility] OR     AMPUTATE TOE(S) Left 4/18/2020    Procedure: LEFT SECOND TOE AMPUTATION;  Surgeon: Ryan Bhagat DPM;  Location: [facility] OR     ANGIOGRAM  6/29/05    subtotal occ.mid LAD,SUSAN mid LAD     ANGIOGRAM  7/05    mild CAD,patent stent,no flow-limiting lesions,sev.LV dysf.LAD enlarged     ANGIOGRAM  2/09    Sev.single vessel disease,Mod LV dysf.distal LAD 90%,70-75% mid lad just before prev stent,SUSAN to prox.mid LAD, endeavor to distal LAD     ANGIOGRAM  11/13/13    restenosis, stent LAD     BACK SURGERY      back surgery x3     CV CORONARY ANGIOGRAM N/A 7/8/2019    Procedure: Coronary Angiogram;  Surgeon: Jose Alfredo Crockett MD;  Location:  HEART CARDIAC CATH LAB     CV LEFT HEART CATH N/A 7/8/2019    Procedure: Left Heart Cath;  Surgeon: Jose Alfredo Crockett MD;  Location:  HEART CARDIAC CATH LAB     CV PCI ASPIRATION THROMECTOMY N/A  7/8/2019    Procedure: PCI Aspiration Thrombectomy;  Surgeon: Jose Alfredo Crockett MD;  Location:  HEART CARDIAC CATH LAB     CV PCI STENT DRUG ELUTING N/A 7/8/2019    Procedure: PCI Stent Drug Eluting;  Surgeon: Jose Alfredo Crockett MD;  Location:  HEART CARDIAC CATH LAB     HEART CATH LEFT HEART CATH  06/13/2017    SUSAN to OM3     INCISION AND DRAINAGE TOE, COMBINED Bilateral 9/11/2018    Procedure: COMBINED INCISION AND DRAINAGE TOE;  1) Irrigation and debridement ulcer left great toe  2) Amputation 3rd toe right foot at distal interphalangeal joint level;  Surgeon: Miki Harp MD;  Location: [facility] OR     IRRIGATION AND DEBRIDEMENT FOOT, COMBINED Left 3/12/2019    Procedure: Debridement of left foot ulcer sub first MPJ 2 x 2.5 cm down to and including subcutaneous tissue, callus debridement for preulcerative lesion, left second toe.;  Surgeon: Ryan Bhagat DPM;  Location:  OR     ORTHOPEDIC SURGERY      bunion surgery both feet     ORTHOPEDIC SURGERY      left shoulder     SOFT TISSUE SURGERY      debridement of toe numerous time     VASCULAR SURGERY      7 stents in heart     [facility] NONSPECIFIC PROCEDURE      Laminectomy x 3 - (1983 x 2 & 1990)     [facility] NONSPECIFIC PROCEDURE Bilateral 1998    Bunionectomy     [facility] NONSPECIFIC PROCEDURE  1959    Gingival surgery at age 9       FAMILY HISTORY:  Family History   Problem Relation Age of Onset     Cancer - colorectal Sister      Thyroid Disease Brother      Cardiovascular Brother      Diabetes Brother      Cancer Father         tumor in chest and throat     Arthritis Mother      Thyroid Disease Mother      Diabetes Mother      Glaucoma No family hx of      Macular Degeneration No family hx of        SOCIAL HISTORY:  Social History     Socioeconomic History     Marital status:      Spouse name: None     Number of children: None     Years of education: None     Highest education level: None   Occupational History     None   Social Needs      Financial resource strain: None     Food insecurity     Worry: None     Inability: None     Transportation needs     Medical: None     Non-medical: None   Tobacco Use     Smoking status: Former Smoker     Packs/day: 1.00     Years: 25.00     Pack years: 25.00     Types: Cigarettes     Start date: 1980     Quit date: 7/25/2005     Years since quitting: 15.3     Smokeless tobacco: Never Used   Substance and Sexual Activity     Alcohol use: Yes     Alcohol/week: 4.0 standard drinks     Types: 4 Shots of liquor per week     Frequency: Never     Comment: OCCASSIONALLY      Drug use: No     Sexual activity: Yes     Partners: Female   Lifestyle     Physical activity     Days per week: None     Minutes per session: None     Stress: None   Relationships     Social connections     Talks on phone: None     Gets together: None     Attends Jewish service: None     Active member of club or organization: None     Attends meetings of clubs or organizations: None     Relationship status: None     Intimate partner violence     Fear of current or ex partner: None     Emotionally abused: None     Physically abused: None     Forced sexual activity: None   Other Topics Concern     Parent/sibling w/ CABG, MI or angioplasty before 65F 55M? Yes      Service Not Asked     Blood Transfusions Not Asked     Caffeine Concern No     Comment: 3 cups daily     Occupational Exposure Not Asked     Hobby Hazards Not Asked     Sleep Concern Not Asked     Stress Concern Not Asked     Weight Concern Not Asked     Special Diet Yes     Comment: watching carb intake     Back Care Not Asked     Exercise No     Comment: some walking     Bike Helmet Not Asked     Seat Belt Not Asked     Self-Exams Not Asked   Social History Narrative     None       Review of Systems:  Cardiovascular: pos for occasional chest discomfort, palpitations. Neg orthopnea, worsening LE edema  Constitutional: negative for chills, sweats, fevers.   Resp: Negative for dyspnea  "at rest, pos dyspnea on exertion, bending over. No cough, wheezing, hemoptysis, known chronic lung disease  HEENT: Negative for new visual changes, frequent headaches  Gastrointestinal: negative for abdominal pain, diarrhea, blood in stool, nausea, vomiting  Hematologic/lymphatic: Pos for current systemic anticoagulation, neg hx of blood clots  Musculoskeletal: negative for new back pain, joint pain. Pos hx gangrene toes, OM.  Neurological: negative for focal weakness, LOC, seizures, syncope. Pos occasional dizziness.       Physical Exam:  Vitals: /71 (BP Location: Right arm, Patient Position: Sitting, Cuff Size: Adult Regular)   Pulse 73   Ht 1.93 m (6' 4\")   Wt 117.2 kg (258 lb 4.8 oz)   BMI 31.44 kg/m     Wt Readings from Last 4 Encounters:   11/30/20 117.2 kg (258 lb 4.8 oz)   11/10/20 118.8 kg (262 lb)   10/08/20 119.2 kg (262 lb 12.6 oz)   09/30/20 119.2 kg (262 lb 12.6 oz)       GEN:  In general, this is a well nourished  male in no acute distress on room air.  Patient ambulatory, unaccompanied.   HEENT:  Pupils equal, round. Sclerae nonicteric.   NECK: Supple, no masses appreciated. Trachea midline. No JVD while upright.  C/V:  Irregularly irregular rhythm, no murmur, rub or gallop. No S3 or RV heave.   RESP: Respirations are unlabored. No use of accessory muscles. Clear to auscultation bilaterally without wheezing, rales, or rhonchi.  GI: Abdomen soft, nontender, nondistended.   EXTREM: Trace bilateral LE edema. No cyanosis or clubbing.  NEURO: Alert and oriented, cooperative. Gait not formally assessed. No obvious focal deficits.   PSYCH: Somewhat flat affect.       Recent Lab Results:  LIPID RESULTS:  Lab Results   Component Value Date    CHOL 107 08/13/2020    HDL 45 08/13/2020    LDL 53 08/13/2020    TRIG 44 08/13/2020             BMP RESULTS:  Lab Results   Component Value Date     11/25/2020    POTASSIUM 4.5 11/25/2020    CHLORIDE 101 11/25/2020    CO2 29 11/25/2020    " ANIONGAP 3 11/25/2020     (H) 11/25/2020    BUN 19 11/25/2020    CR 1.19 11/25/2020    GFRESTIMATED 61 11/25/2020    GFRESTBLACK 71 11/25/2020    BETTIE 8.6 11/25/2020        A1C RESULTS:  Lab Results   Component Value Date    A1C 9.8 (H) 09/15/2020         Additional pertinent testing:  Echo 7/6/2019  Interpretation Summary     The left ventricle is normal in size. Proximal septal thickening is noted.  Left ventricular systolic function is moderately reduced. The visual ejection  fraction is estimated at 35-40%. LVEF 36% based on biplane 2D tracing.  Diastolic function not assessed due to atrial fibrillation. There is akinesis  of the mid to apical anterior walls, the mid anteroseptal and apical septal  walls and the true LV apex. There is no thrombus seen in the left ventricle.  There is no thrombus seen in the left ventricle.  The right ventricle is normal size. Mildly decreased right ventricular  systolic function.  Trace to mild mitral and tricuspid regurgitation.  No pericardial effusion.  In comparison to the previous report dated 01/18/2019, the findings are  similar.        Gill Doty PA-C  [facility]  Pager [phone]        Thank you for allowing me to participate in the care of your patient.      Sincerely,     CONCEPCION Dasilva     [facility]    cc:   Justin Tomlin MD  [id number]  [place] [address]

## 2020-11-30 NOTE — PROGRESS NOTES
"  Cardiology Progress Note    Date of Service: 12/01/2020      Reason for visit: C[facility] clinic follow up, chronic systolic heart failure, ischemic cardiomyopathy.    Primary cardiologist: Dr. Justin Tomlin      HPI:  Mr. Elder is a 70 year old male with a PMHx including poorly controlled DM2, hypothyroidism, hypertension, CVA, untreated MARISOL, and chronic renal insufficiency. He also has longstanding afib (on AC), and known coronary disease with prior MI/stenting and resultant ischemic CMO with chronic EF of 35-40%. He also struggles with peripheral wounds from his diabetes. In Jan 2019 he was admitted for osteomyelitis and his stay was complicated by ABNER with IV antibiotics and his lopidogrel was stopped temporarily at that time. He presented back shortly after, with weight gain and ANDREWS requiring a hospitalizing for CHF exacerbation. Echo showed no new WMAs and his EF remained in the 35-40% range.    He was then again hospitalized in March 2019 for right foot cellulitis and right toe osteomyelitis. He got IV abx and underwent partial toe amputation as well as I+D of his left foot ulcer. During that stay, he did not have an exacerbation of his heart failure, and his atrial fibrillation remained under good control. He returned in July 2019 for chest pain, and underwent SUSAN x 3 to the RCA. Echo showed EF remained 35-40% without significant changes. He returned a few weeks later with atypical chest pain, and medical management was recommended as repeat stress test did not show any new ischemia.      More recently, in March of 2020, he called to report low BPs at home with orthostasis (as low as 80/60). I temporarily held most of his cardiac medications, and shortly after he required increase in Lasix due to leg swelling. As he endorsed some new chest tightness and \"just not feeling good,\" we performed another stress test in mid April which showed EF 37% with akinesis of mid to distal anterior, anteroseptal and apical " walls. There was also a medium medium sized area of a severe degree of infarction in the mid to distal anterior, apical and anteroseptal segment(s) of the left ventricle.  There was no ischemia is identified, and notably, this was not significantly changed when compared to July of 2019. Ultimately, shortly after, he was again found to have a gangrene toe and underwent an amputation. We then slowly reintroduced his cardiac medications back.     Jayro was last seen via virtual visit by Dr. Tomlin in mid August of 2020. At that time, no changes were made but he asked him to continue to monitor his ongoing orthostasis symptoms. Today, we are doing an in clinic visit. He tells me that overall, he has been feeling about the same. He continues to endorse shortness of breath and dizziness, often when he bends over and stands back up. It does not sound like this has worsened any. He has palpitations as well, but this also sounds similar to prior. He reports BP is still low at home at times, but here today is acceptable at 110/71. His weight continues to fluctuate 4-5 lbs at home, and here today is down slightly at 258 lbs. His hands and feet continue to bother him due to neuropathy and his blood sugars remain under poor control, he tells me mostly >200s. He has not been hospitalized or had any further infections since his last visit. He denies any abnormal bleeding on the plavix/Eliquis combo. He has ongoing intermittent chest discomfort, but says often this improves after he belches, but sometimes takes awhile for it to fully go away. This does not sound to be exertional in nature.     His labs done last week showed continued preserved renal function and electrolytes.        Assessment/Plan:     1. Coronary artery disease.              --Long cardiac hx including MI in 2005 with proximal LAD stenting, repeat LAD stenting in 2009 and distal LAD stenting in 2013. In June 2017 he had stent placement to an obtuse  marginal. In July 2019, he was found to have progression of his disease and underwent SUSAN x 3 to the RCA. He returned a few weeks later with atypical CP in later July 2019 and Lexiscan did not show new ischemia. Thus, we have been medically managing him.    --He continues with intermittent chest discomfort, though often this improves with belching. He had a repeat Lexiscan in April 2020 which was abnormal, but overall unchanged with no new ischemia. Per Dr. Tomlin, due to his multiple overlapping stents, he would prefer to keep him on the clopidogrel as well as his anticoagulation as he is tolerating this without issue.              --Continue Imdur, 60mg daily.                --Continue atorvastatin 40mg at bedtime. Last LDL within goal at 53 in Aug 2020.       2.  Chronic systolic heart failure, known ischemic cardiomyopathy.              --Known chronic EF 35-40%, which remained unchanged per last echo July 2019 when he returned with angina, requiring repeat intervention.               --He has struggled with symptomatic orthostatic hypotension, which is suspected due to an element of autonomic insufficiency from his difficult diabetes control. We had to temporarily hold his cardiac medications this summer in the setting of a gangrenous toe, but this also resulted in some issues with his CHF. Currently, he is back on carvedilol and spironolactone, but it appears that his lisinopril was stopped over the summer also due to BP issues. However, this may be reconsidered again in the future for renal protection in the setting of his DM. His renal function is acceptable currently.              --No change to Lasix at 40mg daily. He appears overall euvolemic on exam today.     3. Chronic atrial fibrillation.              --Appears rate controlled, though does have chronic, intermittent palpitations. Holter performed in April 2020 showed average HR 88 with chronic afib. I am hesitant to decrease his carvedilol for now,  but if hypotension becomes an issue again, this could be a consideration.              --Continue Eliquis 5mg BID. No new bleeding issues.           Follow up plan: Return in ~3 months to establish with a new cardiologist, with labs prior.       Orders this Visit:  Orders Placed This Encounter   Procedures     Basic metabolic panel     N terminal pro BNP outpatient     CBC with platelets     Follow-Up with Cardiologist     No orders of the defined types were placed in this encounter.    There are no discontinued medications.      CURRENT MEDICATIONS:  Current Outpatient Medications   Medication Sig Dispense Refill     apixaban ANTICOAGULANT (ELIQUIS) 5 MG tablet Take 1 tablet (5 mg) by mouth 2 times daily       atorvastatin (LIPITOR) 40 MG tablet Take 1 tablet (40 mg) by mouth every evening 90 tablet 3     Cadexomer Iodine, topical, 0.9% (IODOSORB) 0.9 % GEL gel Apply to ulcer on left foot every other day with gauze dressing. 40 g 0     carvedilol (COREG) 25 MG tablet Take 1 tablet (25 mg) by mouth 2 times daily (with meals) 180 tablet 3     clopidogrel (PLAVIX) 75 MG tablet Take 1 tablet (75 mg) by mouth daily 90 tablet 3     furosemide (LASIX) 40 MG tablet Take 1 tablet (40 mg) by mouth daily 90 tablet 0     gabapentin (NEURONTIN) 300 MG capsule Take 2-3 capsules (600-900 mg) by mouth At Bedtime 180 capsule 1     insulin aspart (NOVOLOG FLEXPEN) 100 UNIT/ML pen Take 10 units with breakfast and lunch and 12 units with dinner + correctional scale 30 mL 1     insulin aspart (NOVOLOG FLEXPEN) 100 UNIT/ML pen Inject Subcutaneous 3 times daily (with meals) Sliding Scale  Do not give sliding scale insulin if Pre-Meal BG less than 140.   For Pre-Meal:   - 200 give 2 units.    - 250 give 4 units.    - 300 give 6 units.    - 350 give 8 units.    - 400 give 10 units.       insulin glargine (LANTUS PEN) 100 UNIT/ML pen Inject 48 Units Subcutaneous every morning 30 mL 1     insulin pen needle (31G  X 6 MM) 31G X 6 MM miscellaneous Use  as directed. 100 each 11     isosorbide mononitrate (IMDUR) 60 MG 24 hr tablet Take 1 tablet (60 mg) by mouth daily 90 tablet 3     levothyroxine (SYNTHROID, LEVOTHROID) 137 MCG tablet Take 137 mcg by mouth every evening  90 tablet 3     nitroglycerin (NITROSTAT) 0.4 MG sublingual tablet Place 1 tablet (0.4 mg) under the tongue every 5 minutes as needed for chest pain 50 tablet 0     order for DME Equipment being ordered: size 12 surgical shoe 1 Device 0     order for DME Equipment being ordered: Walker Wheels () and Walker ()  Treatment Diagnosis: Impaired gait, heel WB bilaterally. 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     Cardiomyopathy (H)      Cellulitis of left lower extremity 3/13/2018     Cerebral infarction (H)     eye sight decreased in peripheral of right side and blurry     Diabetic ulcer of left midfoot associated with type 2 diabetes mellitus, with fat layer exposed (H) 3/13/2018     Essential hypertension, benign 11/13/2002     Generalized osteoarthrosis, unspecified site 11/13/2002     Microalbuminuria 3/13/2018    X1     Myocardial infarction (H)      Neuropathy      Sepsis (H)      Sleep apnea     doesn't use it     Substance abuse (H)      Syncope, unspecified syncope type 3/13/2018     Thyroid disease     takes medicine     Tobacco use disorder 11/13/2002     Type 2 diabetes mellitus with diabetic peripheral angiopathy without gangrene, unspecified long term insulin use status 2005        PAST SURGICAL HISTORY:  Past Surgical History:   Procedure Laterality Date     AMPUTATE TOE(S) Right 1/6/2019    Procedure: Right open second toe partial amputation;  Surgeon: Sabino Garcia DPM;  Location: [facility] OR     AMPUTATE TOE(S) Right 1/8/2019    Procedure: 1.  Revision right second toe amputation with resection of distal half of proximal phalanx with full closure.    2.  Debridement of callus/preulcerative lesion, distal left second toe and plantar left first metatarsophalangeal joint.;  Surgeon: Ryan Bhagat DPM;  Location: [facility] OR     AMPUTATE TOE(S) Right 3/12/2019    Procedure: Partial right fourth toe amputation.;  Surgeon: Ryan Bhagat DPM;  Location: [facility] OR     AMPUTATE TOE(S) Right 5/6/2019    Procedure: Right partial third toe amputation;  Surgeon: Татяьна Mckeon DPM, Podiatry/Foot and Ankle Surgery;  Location: [facility] OR     AMPUTATE TOE(S) Left 4/18/2020    Procedure: LEFT SECOND TOE AMPUTATION;  Surgeon: Ryan Bhagat DPM;  Location: [facility] OR     ANGIOGRAM  6/29/05    subtotal occ.mid LAD,SUSAN mid LAD     ANGIOGRAM  7/05    mild CAD,patent stent,no flow-limiting lesions,sev.LV dysf.LAD enlarged     ANGIOGRAM  2/09    Sev.single vessel disease,Mod LV dysf.distal LAD 90%,70-75% mid lad just before prev stent,SUSAN to prox.mid LAD, endeavor to distal LAD     ANGIOGRAM  11/13/13    restenosis, stent LAD     BACK SURGERY      back surgery x3     CV CORONARY ANGIOGRAM N/A 7/8/2019    Procedure: Coronary Angiogram;  Surgeon: Jose Alfredo Crockett MD;  Location:  [facility]ATH [facility]     CV LEFT HEART CATH N/A 7/8/2019    Procedure: Left Heart Cath;  Surgeon: Jose Alfredo Crockett MD;  Location:  [facility]     CV PCI ASPIRATION THROMECTOMY N/A 7/8/2019    Procedure: PCI Aspiration Thrombectomy;  Surgeon: Jose Alfredo Crockett MD;  Location:  [facility] CATH [facility]     CV PCI STENT DRUG ELUTING N/A 7/8/2019    Procedure: PCI Stent Drug Eluting;  Surgeon: Jose Alfredo Crockett  MD;  Location:  [facility] CARD[facility] CATH LAB     HEART CATH LEFT HEART CATH  06/13/2017    SUSAN to OM3     INCISION AND DRAINAGE TOE, COMBINED Bilateral 9/11/2018    Procedure: COMBINED INCISION AND DRAINAGE TOE;  1) Irrigation and debridement ulcer left great toe  2) Amputation 3rd toe right foot at distal interphalangeal joint level;  Surgeon: Miki Harp MD;  Location: [facility] OR     IRRIGATION AND DEBRIDEMENT FOOT, COMBINED Left 3/12/2019    Procedure: Debridement of left foot ulcer sub first MPJ 2 x 2.5 cm down to and including subcutaneous tissue, callus debridement for preulcerative lesion, left second toe.;  Surgeon: Ryan Bhagat DPM;  Location:  OR     ORTHOPEDIC SURGERY      bunion surgery both feet     ORTHOPEDIC SURGERY      left shoulder     SOFT TISSUE SURGERY      debridement of toe numerous time     VASCULAR SURGERY      7 stents in heart     [facility] NONSPECIFIC PROCEDURE      Laminectomy x 3 - (1983 x 2 & 1990)     [facility] NONSPECIFIC PROCEDURE Bilateral 1998    Bunionectomy     [facility] NONSPECIFIC PROCEDURE  1959    Gingival surgery at age 9       FAMILY HISTORY:  Family History   Problem Relation Age of Onset     Cancer - colorectal Sister      Thyroid Disease Brother      Cardiovascular Brother      Diabetes Brother      Cancer Father         tumor in chest and throat     Arthritis Mother      Thyroid Disease Mother      Diabetes Mother      Glaucoma No family hx of      Macular Degeneration No family hx of        SOCIAL HISTORY:  Social History     Socioeconomic History     Marital status:      Spouse name: None     Number of children: None     Years of education: None     Highest education level: None   Occupational History     None   Social Needs     Financial resource strain: None     Food insecurity     Worry: None     Inability: None     Transportation needs     Medical: None     Non-medical: None   Tobacco Use     Smoking status: Former Smoker     Packs/day: 1.00     Years: 25.00      Pack years: 25.00     Types: Cigarettes     Start date: 1980     Quit date: 7/25/2005     Years since quitting: 15.3     Smokeless tobacco: Never Used   Substance and Sexual Activity     Alcohol use: Yes     Alcohol/week: 4.0 standard drinks     Types: 4 Shots of liquor per week     Frequency: Never     Comment: OCCASSIONALLY      Drug use: No     Sexual activity: Yes     Partners: Female   Lifestyle     Physical activity     Days per week: None     Minutes per session: None     Stress: None   Relationships     Social connections     Talks on phone: None     Gets together: None     Attends [employer] service: None     Active member of club or organization: None     Attends meetings of clubs or organizations: None     Relationship status: None     Intimate partner violence     Fear of current or ex partner: None     Emotionally abused: None     Physically abused: None     Forced sexual activity: None   Other Topics Concern     Parent/sibling w/ CABG, MI or angioplasty before 65F 55M? Yes      Service Not Asked     Blood Transfusions Not Asked     Caffeine Concern No     Comment: 3 cups daily     Occupational Exposure Not Asked     Hobby Hazards Not Asked     Sleep Concern Not Asked     Stress Concern Not Asked     Weight Concern Not Asked     Special Diet Yes     Comment: watching carb intake     Back Care Not Asked     Exercise No     Comment: some walking     Bike Helmet Not Asked     Seat Belt Not Asked     Self-Exams Not Asked   Social History Narrative     None       Review of Systems:  Cardiovascular: pos for occasional chest discomfort, palpitations. Neg orthopnea, worsening LE edema  Constitutional: negative for chills, sweats, fevers.   Resp: Negative for dyspnea at rest, pos dyspnea on exertion, bending over. No cough, wheezing, hemoptysis, known chronic lung disease  HEENT: Negative for new visual changes, frequent headaches  Gastrointestinal: negative for abdominal pain, diarrhea, blood in stool,  "nausea, vomiting  Hematologic/lymphatic: Pos for current systemic anticoagulation, neg hx of blood clots  Musculoskeletal: negative for new back pain, joint pain. Pos hx gangrene toes, OM.  Neurological: negative for focal weakness, LOC, seizures, syncope. Pos occasional dizziness.       Physical Exam:  Vitals: /71 (BP Location: Right arm, Patient Position: Sitting, Cuff Size: Adult Regular)   Pulse 73   Ht 1.93 m (6' 4\")   Wt 117.2 kg (258 lb 4.8 oz)   BMI 31.44 kg/m     Wt Readings from Last 4 Encounters:   11/30/20 117.2 kg (258 lb 4.8 oz)   11/10/20 118.8 kg (262 lb)   10/08/20 119.2 kg (262 lb 12.6 oz)   09/30/20 119.2 kg (262 lb 12.6 oz)       GEN:  In general, this is a well nourished  male in no acute distress on room air.  Patient ambulatory, unaccompanied.   HEENT:  Pupils equal, round. Sclerae nonicteric.   NECK: Supple, no masses appreciated. Trachea midline. No JVD while upright.  C/V:  Irregularly irregular rhythm, no murmur, rub or gallop. No S3 or RV heave.   RESP: Respirations are unlabored. No use of accessory muscles. Clear to auscultation bilaterally without wheezing, rales, or rhonchi.  GI: Abdomen soft, nontender, nondistended.   EXTREM: Trace bilateral LE edema. No cyanosis or clubbing.  NEURO: Alert and oriented, cooperative. Gait not formally assessed. No obvious focal deficits.   PSYCH: Somewhat flat affect.       Recent Lab Results:  LIPID RESULTS:  Lab Results   Component Value Date    CHOL 107 08/13/2020    HDL 45 08/13/2020    LDL 53 08/13/2020    TRIG 44 08/13/2020             BMP RESULTS:  Lab Results   Component Value Date     11/25/2020    POTASSIUM 4.5 11/25/2020    CHLORIDE 101 11/25/2020    CO2 29 11/25/2020    ANIONGAP 3 11/25/2020     (H) 11/25/2020    BUN 19 11/25/2020    CR 1.19 11/25/2020    GFRESTIMATED 61 11/25/2020    GFRESTBLACK 71 11/25/2020    BETTIE 8.6 11/25/2020        A1C RESULTS:  Lab Results   Component Value Date    A1C 9.8 (H) " 09/15/2020         Additional pertinent testing:  Echo 7/6/2019  Interpretation Summary     The left ventricle is normal in size. Proximal septal thickening is noted.  Left ventricular systolic function is moderately reduced. The visual ejection  fraction is estimated at 35-40%. LVEF 36% based on biplane 2D tracing.  Diastolic function not assessed due to atrial fibrillation. There is akinesis  of the mid to apical anterior walls, the mid anteroseptal and apical septal  walls and the true LV apex. There is no thrombus seen in the left ventricle.  There is no thrombus seen in the left ventricle.  The right ventricle is normal size. Mildly decreased right ventricular  systolic function.  Trace to mild mitral and tricuspid regurgitation.  No pericardial effusion.  In comparison to the previous report dated 01/18/2019, the findings are  similar.        Gill Doty PA-C  [facility]  Pager [phone]

## 2020-11-30 NOTE — LETTER
11/30/2020    Davion Cordova PA-C  [address] [facility] [address]    RE: Jayro Jainlala       Dear Colleague,    I had the pleasure of seeing Jayro Elder in the [facility].      Cardiology Progress Note    Date of Service: 12/01/2020      Reason for visit: CORE clinic follow up, chronic systolic heart failure, ischemic cardiomyopathy.    Primary cardiologist: Dr. Justin Tomlin      HPI:  Mr. Elder is a 70 year old male with a PMHx including poorly controlled DM2, hypothyroidism, hypertension, CVA, untreated MARISOL, and chronic renal insufficiency. He also has longstanding afib (on AC), and known coronary disease with prior MI/stenting and resultant ischemic CMO with chronic EF of 35-40%. He also struggles with peripheral wounds from his diabetes. In Jan 2019 he was admitted for osteomyelitis and his stay was complicated by ABNER with IV antibiotics and his lopidogrel was stopped temporarily at that time. He presented back shortly after, with weight gain and ANDREWS requiring a hospitalizing for CHF exacerbation. Echo showed no new WMAs and his EF remained in the 35-40% range.    He was then again hospitalized in March 2019 for right foot cellulitis and right toe osteomyelitis. He got IV abx and underwent partial toe amputation as well as I+D of his left foot ulcer. During that stay, he did not have an exacerbation of his heart failure, and his atrial fibrillation remained under good control. He returned in July 2019 for chest pain, and underwent SUSAN x 3 to the RCA. Echo showed EF remained 35-40% without significant changes. He returned a few weeks later with atypical chest pain, and medical management was recommended as repeat stress test did not show any new ischemia.      More recently, in March of 2020, he called to report low BPs at home with orthostasis (as low as 80/60). I temporarily held most of his cardiac medications, and shortly after he required increase in  "Lasix due to leg swelling. As he endorsed some new chest tightness and \"just not feeling good,\" we performed another stress test in mid April which showed EF 37% with akinesis of mid to distal anterior, anteroseptal and apical walls. There was also a medium medium sized area of a severe degree of infarction in the mid to distal anterior, apical and anteroseptal segment(s) of the left ventricle.  There was no ischemia is identified, and notably, this was not significantly changed when compared to July of 2019. Ultimately, shortly after, he was again found to have a gangrene toe and underwent an amputation. We then slowly reintroduced his cardiac medications back.     Jayro was last seen via virtual visit by Dr. Tomlin in mid August of 2020. At that time, no changes were made but he asked him to continue to monitor his ongoing orthostasis symptoms. Today, we are doing an in clinic visit. He tells me that overall, he has been feeling about the same. He continues to endorse shortness of breath and dizziness, often when he bends over and stands back up. It does not sound like this has worsened any. He has palpitations as well, but this also sounds similar to prior. He reports BP is still low at home at times, but here today is acceptable at 110/71. His weight continues to fluctuate 4-5 lbs at home, and here today is down slightly at 258 lbs. His hands and feet continue to bother him due to neuropathy and his blood sugars remain under poor control, he tells me mostly >200s. He has not been hospitalized or had any further infections since his last visit. He denies any abnormal bleeding on the plavix/Eliquis combo. He has ongoing intermittent chest discomfort, but says often this improves after he belches, but sometimes takes awhile for it to fully go away. This does not sound to be exertional in nature.     His labs done last week showed continued preserved renal function and electrolytes.        Assessment/Plan:     1. " Coronary artery disease.              --Long cardiac hx including MI in 2005 with proximal LAD stenting, repeat LAD stenting in 2009 and distal LAD stenting in 2013. In June 2017 he had stent placement to an obtuse marginal. In July 2019, he was found to have progression of his disease and underwent SUSAN x 3 to the RCA. He returned a few weeks later with atypical CP in later July 2019 and Lexiscan did not show new ischemia. Thus, we have been medically managing him.    --He continues with intermittent chest discomfort, though often this improves with belching. He had a repeat Lexiscan in April 2020 which was abnormal, but overall unchanged with no new ischemia. Per Dr. Tomlin, due to his multiple overlapping stents, he would prefer to keep him on the clopidogrel as well as his anticoagulation as he is tolerating this without issue.              --Continue Imdur, 60mg daily.                --Continue atorvastatin 40mg at bedtime. Last LDL within goal at 53 in Aug 2020.       2.  Chronic systolic heart failure, known ischemic cardiomyopathy.              --Known chronic EF 35-40%, which remained unchanged per last echo July 2019 when he returned with angina, requiring repeat intervention.               --He has struggled with symptomatic orthostatic hypotension, which is suspected due to an element of autonomic insufficiency from his difficult diabetes control. We had to temporarily hold his cardiac medications this summer in the setting of a gangrenous toe, but this also resulted in some issues with his CHF. Currently, he is back on carvedilol and spironolactone, but it appears that his lisinopril was stopped over the summer also due to BP issues. However, this may be reconsidered again in the future for renal protection in the setting of his DM. His renal function is acceptable currently.              --No change to Lasix at 40mg daily. He appears overall euvolemic on exam today.     3. Chronic atrial  fibrillation.              --Appears rate controlled, though does have chronic, intermittent palpitations. Holter performed in April 2020 showed average HR 88 with chronic afib. I am hesitant to decrease his carvedilol for now, but if hypotension becomes an issue again, this could be a consideration.              --Continue Eliquis 5mg BID. No new bleeding issues.           Follow up plan: Return in ~3 months to establish with a new cardiologist, with labs prior.       Orders this Visit:  Orders Placed This Encounter   Procedures     Basic metabolic panel     N terminal pro BNP outpatient     CBC with platelets     Follow-Up with Cardiologist     No orders of the defined types were placed in this encounter.    There are no discontinued medications.      CURRENT MEDICATIONS:  Current Outpatient Medications   Medication Sig Dispense Refill     apixaban ANTICOAGULANT (ELIQUIS) 5 MG tablet Take 1 tablet (5 mg) by mouth 2 times daily       atorvastatin (LIPITOR) 40 MG tablet Take 1 tablet (40 mg) by mouth every evening 90 tablet 3     Cadexomer Iodine, topical, 0.9% (IODOSORB) 0.9 % GEL gel Apply to ulcer on left foot every other day with gauze dressing. 40 g 0     carvedilol (COREG) 25 MG tablet Take 1 tablet (25 mg) by mouth 2 times daily (with meals) 180 tablet 3     clopidogrel (PLAVIX) 75 MG tablet Take 1 tablet (75 mg) by mouth daily 90 tablet 3     furosemide (LASIX) 40 MG tablet Take 1 tablet (40 mg) by mouth daily 90 tablet 0     gabapentin (NEURONTIN) 300 MG capsule Take 2-3 capsules (600-900 mg) by mouth At Bedtime 180 capsule 1     insulin aspart (NOVOLOG FLEXPEN) 100 UNIT/ML pen Take 10 units with breakfast and lunch and 12 units with dinner + correctional scale 30 mL 1     insulin aspart (NOVOLOG FLEXPEN) 100 UNIT/ML pen Inject Subcutaneous 3 times daily (with meals) Sliding Scale  Do not give sliding scale insulin if Pre-Meal BG less than 140.   For Pre-Meal:   - 200 give 2 units.    - 250 give  4 units.    - 300 give 6 units.    - 350 give 8 units.    - 400 give 10 units.       insulin glargine (LANTUS PEN) 100 UNIT/ML pen Inject 48 Units Subcutaneous every morning 30 mL 1     insulin pen needle (31G X 6 MM) 31G X 6 MM miscellaneous Use  as directed. 100 each 11     isosorbide mononitrate (IMDUR) 60 MG 24 hr tablet Take 1 tablet (60 mg) by mouth daily 90 tablet 3     levothyroxine (SYNTHROID, LEVOTHROID) 137 MCG tablet Take 137 mcg by mouth every evening  90 tablet 3     nitroglycerin (NITROSTAT) 0.4 MG sublingual tablet Place 1 tablet (0.4 mg) under the tongue every 5 minutes as needed for chest pain 50 tablet 0     order for DME Equipment being ordered: size 12 surgical shoe 1 Device 0     order for DME Equipment being ordered: Walker Wheels () and Walker ()  Treatment Diagnosis: Impaired gait, heel WB bilaterally. 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     Cardiomyopathy (H)      Cellulitis of left lower extremity 3/13/2018     Cerebral infarction (H)     eye sight decreased in peripheral of right side and blurry     Diabetic ulcer of left midfoot associated with type 2 diabetes mellitus, with fat layer exposed (H) 3/13/2018     Essential hypertension, benign 11/13/2002     Generalized osteoarthrosis, unspecified site 11/13/2002     Microalbuminuria 3/13/2018    X1     Myocardial infarction (H)      Neuropathy      Sepsis (H)      Sleep apnea     doesn't use it     Substance abuse (H)      Syncope, unspecified  syncope type 3/13/2018     Thyroid disease     takes medicine     Tobacco use disorder 11/13/2002     Type 2 diabetes mellitus with diabetic peripheral angiopathy without gangrene, unspecified long term insulin use status 2005       PAST SURGICAL HISTORY:  Past Surgical History:   Procedure Laterality Date     AMPUTATE TOE(S) Right 1/6/2019    Procedure: Right open second toe partial amputation;  Surgeon: Sabino Garcia DPM;  Location: [facility] OR     AMPUTATE TOE(S) Right 1/8/2019    Procedure: 1.  Revision right second toe amputation with resection of distal half of proximal phalanx with full closure.    2.  Debridement of callus/preulcerative lesion, distal left second toe and plantar left first metatarsophalangeal joint.;  Surgeon: Ryan Bhagat DPM;  Location: [facility] OR     AMPUTATE TOE(S) Right 3/12/2019    Procedure: Partial right fourth toe amputation.;  Surgeon: Ryan Bhgaat DPM;  Location: [facility] OR     AMPUTATE TOE(S) Right 5/6/2019    Procedure: Right partial third toe amputation;  Surgeon: Татьяна Mckeon DPM, Podiatry/Foot and Ankle Surgery;  Location: [facility] OR     AMPUTATE TOE(S) Left 4/18/2020    Procedure: LEFT SECOND TOE AMPUTATION;  Surgeon: Ryan Bhagat DPM;  Location: [facility] OR     ANGIOGRAM  6/29/05    subtotal occ.mid LAD,SUSAN mid LAD     ANGIOGRAM  7/05    mild CAD,patent stent,no flow-limiting lesions,sev.LV dysf.LAD enlarged     ANGIOGRAM  2/09    Sev.single vessel disease,Mod LV dysf.distal LAD 90%,70-75% mid lad just before prev stent,SUSAN to prox.mid LAD, endeavor to distal LAD     ANGIOGRAM  11/13/13    restenosis, stent LAD     BACK SURGERY      back surgery x3     CV CORONARY ANGIOGRAM N/A 7/8/2019    Procedure: Coronary Angiogram;  Surgeon: Jose Alfredo Crockett MD;  Location:  HEART CARDIAC CATH LAB     CV LEFT HEART CATH N/A 7/8/2019    Procedure: Left Heart Cath;  Surgeon: Jose Alfredo Crockett MD;  Location:  HEART CARDIAC CATH LAB     CV PCI ASPIRATION THROMECTOMY N/A  7/8/2019    Procedure: PCI Aspiration Thrombectomy;  Surgeon: Jose Alfredo Crcokett MD;  Location:  HEART CARDIAC CATH LAB     CV PCI STENT DRUG ELUTING N/A 7/8/2019    Procedure: PCI Stent Drug Eluting;  Surgeon: Jose Alfredo Crockett MD;  Location:  HEART CARDIAC CATH LAB     HEART CATH LEFT HEART CATH  06/13/2017    SUSAN to OM3     INCISION AND DRAINAGE TOE, COMBINED Bilateral 9/11/2018    Procedure: COMBINED INCISION AND DRAINAGE TOE;  1) Irrigation and debridement ulcer left great toe  2) Amputation 3rd toe right foot at distal interphalangeal joint level;  Surgeon: Miki Harp MD;  Location: [facility] OR     IRRIGATION AND DEBRIDEMENT FOOT, COMBINED Left 3/12/2019    Procedure: Debridement of left foot ulcer sub first MPJ 2 x 2.5 cm down to and including subcutaneous tissue, callus debridement for preulcerative lesion, left second toe.;  Surgeon: Ryan Bhagat DPM;  Location:  OR     ORTHOPEDIC SURGERY      bunion surgery both feet     ORTHOPEDIC SURGERY      left shoulder     SOFT TISSUE SURGERY      debridement of toe numerous time     VASCULAR SURGERY      7 stents in heart     [facility] NONSPECIFIC PROCEDURE      Laminectomy x 3 - (1983 x 2 & 1990)     [facility] NONSPECIFIC PROCEDURE Bilateral 1998    Bunionectomy     [facility] NONSPECIFIC PROCEDURE  1959    Gingival surgery at age 9       FAMILY HISTORY:  Family History   Problem Relation Age of Onset     Cancer - colorectal Sister      Thyroid Disease Brother      Cardiovascular Brother      Diabetes Brother      Cancer Father         tumor in chest and throat     Arthritis Mother      Thyroid Disease Mother      Diabetes Mother      Glaucoma No family hx of      Macular Degeneration No family hx of        SOCIAL HISTORY:  Social History     Socioeconomic History     Marital status:      Spouse name: None     Number of children: None     Years of education: None     Highest education level: None   Occupational History     None   Social Needs      Financial resource strain: None     Food insecurity     Worry: None     Inability: None     Transportation needs     Medical: None     Non-medical: None   Tobacco Use     Smoking status: Former Smoker     Packs/day: 1.00     Years: 25.00     Pack years: 25.00     Types: Cigarettes     Start date: 1980     Quit date: 7/25/2005     Years since quitting: 15.3     Smokeless tobacco: Never Used   Substance and Sexual Activity     Alcohol use: Yes     Alcohol/week: 4.0 standard drinks     Types: 4 Shots of liquor per week     Frequency: Never     Comment: OCCASSIONALLY      Drug use: No     Sexual activity: Yes     Partners: Female   Lifestyle     Physical activity     Days per week: None     Minutes per session: None     Stress: None   Relationships     Social connections     Talks on phone: None     Gets together: None     Attends Orthodoxy service: None     Active member of club or organization: None     Attends meetings of clubs or organizations: None     Relationship status: None     Intimate partner violence     Fear of current or ex partner: None     Emotionally abused: None     Physically abused: None     Forced sexual activity: None   Other Topics Concern     Parent/sibling w/ CABG, MI or angioplasty before 65F 55M? Yes      Service Not Asked     Blood Transfusions Not Asked     Caffeine Concern No     Comment: 3 cups daily     Occupational Exposure Not Asked     Hobby Hazards Not Asked     Sleep Concern Not Asked     Stress Concern Not Asked     Weight Concern Not Asked     Special Diet Yes     Comment: watching carb intake     Back Care Not Asked     Exercise No     Comment: some walking     Bike Helmet Not Asked     Seat Belt Not Asked     Self-Exams Not Asked   Social History Narrative     None       Review of Systems:  Cardiovascular: pos for occasional chest discomfort, palpitations. Neg orthopnea, worsening LE edema  Constitutional: negative for chills, sweats, fevers.   Resp: Negative for dyspnea  "at rest, pos dyspnea on exertion, bending over. No cough, wheezing, hemoptysis, known chronic lung disease  HEENT: Negative for new visual changes, frequent headaches  Gastrointestinal: negative for abdominal pain, diarrhea, blood in stool, nausea, vomiting  Hematologic/lymphatic: Pos for current systemic anticoagulation, neg hx of blood clots  Musculoskeletal: negative for new back pain, joint pain. Pos hx gangrene toes, OM.  Neurological: negative for focal weakness, LOC, seizures, syncope. Pos occasional dizziness.       Physical Exam:  Vitals: /71 (BP Location: Right arm, Patient Position: Sitting, Cuff Size: Adult Regular)   Pulse 73   Ht 1.93 m (6' 4\")   Wt 117.2 kg (258 lb 4.8 oz)   BMI 31.44 kg/m     Wt Readings from Last 4 Encounters:   11/30/20 117.2 kg (258 lb 4.8 oz)   11/10/20 118.8 kg (262 lb)   10/08/20 119.2 kg (262 lb 12.6 oz)   09/30/20 119.2 kg (262 lb 12.6 oz)       GEN:  In general, this is a well nourished  male in no acute distress on room air.  Patient ambulatory, unaccompanied.   HEENT:  Pupils equal, round. Sclerae nonicteric.   NECK: Supple, no masses appreciated. Trachea midline. No JVD while upright.  C/V:  Irregularly irregular rhythm, no murmur, rub or gallop. No S3 or RV heave.   RESP: Respirations are unlabored. No use of accessory muscles. Clear to auscultation bilaterally without wheezing, rales, or rhonchi.  GI: Abdomen soft, nontender, nondistended.   EXTREM: Trace bilateral LE edema. No cyanosis or clubbing.  NEURO: Alert and oriented, cooperative. Gait not formally assessed. No obvious focal deficits.   PSYCH: Somewhat flat affect.       Recent Lab Results:  LIPID RESULTS:  Lab Results   Component Value Date    CHOL 107 08/13/2020    HDL 45 08/13/2020    LDL 53 08/13/2020    TRIG 44 08/13/2020             BMP RESULTS:  Lab Results   Component Value Date     11/25/2020    POTASSIUM 4.5 11/25/2020    CHLORIDE 101 11/25/2020    CO2 29 11/25/2020    " ANIONGAP 3 11/25/2020     (H) 11/25/2020    BUN 19 11/25/2020    CR 1.19 11/25/2020    GFRESTIMATED 61 11/25/2020    GFRESTBLACK 71 11/25/2020    BETTIE 8.6 11/25/2020        A1C RESULTS:  Lab Results   Component Value Date    A1C 9.8 (H) 09/15/2020       Additional pertinent testing:  Echo 7/6/2019  Interpretation Summary     The left ventricle is normal in size. Proximal septal thickening is noted.  Left ventricular systolic function is moderately reduced. The visual ejection  fraction is estimated at 35-40%. LVEF 36% based on biplane 2D tracing.  Diastolic function not assessed due to atrial fibrillation. There is akinesis  of the mid to apical anterior walls, the mid anteroseptal and apical septal  walls and the true LV apex. There is no thrombus seen in the left ventricle.  There is no thrombus seen in the left ventricle.  The right ventricle is normal size. Mildly decreased right ventricular  systolic function.  Trace to mild mitral and tricuspid regurgitation.  No pericardial effusion.  In comparison to the previous report dated 01/18/2019, the findings are  similar.      Gill Doty PA-C  [facility]  Pager [phone]        Thank you for allowing me to participate in the care of your patient.    Sincerely,     CONCEPCION Dasilva     [facility]

## 2020-12-09 ENCOUNTER — OFFICE VISIT (OUTPATIENT)
Dept: URGENT CARE | Facility: URGENT CARE | Age: 70
End: 2020-12-09
Payer: COMMERCIAL

## 2020-12-09 VITALS
SYSTOLIC BLOOD PRESSURE: 114 MMHG | HEART RATE: 83 BPM | BODY MASS INDEX: 31.4 KG/M2 | TEMPERATURE: 98.6 F | DIASTOLIC BLOOD PRESSURE: 64 MMHG | OXYGEN SATURATION: 97 % | WEIGHT: 258 LBS

## 2020-12-09 DIAGNOSIS — S61.401A: Primary | ICD-10-CM

## 2020-12-09 PROCEDURE — 12001 RPR S/N/AX/GEN/TRNK 2.5CM/<: CPT | Performed by: FAMILY MEDICINE

## 2020-12-09 RX ORDER — CEPHALEXIN 500 MG/1
500 CAPSULE ORAL 3 TIMES DAILY
Qty: 21 CAPSULE | Refills: 0 | Status: SHIPPED | OUTPATIENT
Start: 2020-12-09 | End: 2020-12-16

## 2020-12-09 NOTE — PATIENT INSTRUCTIONS
Take full course of antibiotic - keflex - to prevent infection    Keep wound clean and dry for 24 hours  Okay to apply topical antibiotic ointment to wound for 5-7 days  Monitor for wound infection    Suture removal in 10 days      Patient Education     Hand Laceration: All Closures  A laceration is a cut through the skin. Deep cuts usually require stitches. Minor cuts may be closed with surgical tape or skin adhesive.   X-rays may be done if something may have entered the skin through the cut, such as broken glass. You may also be given a tetanus shot if you are not up to date on this vaccination and the object that cut you may carry tetanus.    Home care    Your healthcare provider may prescribe an antibiotic. This is to help prevent infection. Follow all instructions for taking this medicine. Take the medicine every day until it is gone or you are told to stop. You should not have any left over.    The healthcare provider may prescribe medicines for pain. Follow instructions for taking them.    Follow the healthcare provider s instructions on how to care for the cut.    Keep the wound clean and dry. Don't get the wound wet until you are told it is OK to do so. If the bandage gets wet, remove it. Gently pat the wound dry with a clean cloth. Then put on a clean, dry bandage.    To help prevent infection, wash your hands with soap and water before and after caring for the wound.     Caring for stiches: Once you no longer need to keep the stitches dry, clean the wound daily. First, remove the bandage. Then wash the area gently with soap and warm water, or as directed by the healthcare provider. Use a wet cotton swab to loosen and remove any blood or crust that forms. After cleaning, apply a thin layer of antibiotic ointment if advised. Then put on a new bandage unless you are told not to.    Caring for skin glue: Don t put apply liquid, ointment, or cream on the wound while the glue is in place. Avoid activities that  cause heavy sweating. Protect the wound from sunlight. Don't scratch, rub, or pick at the adhesive film. Don't place tape directly over the film. The glue should peel off within 5 to 10 days.     Caring for surgical tape: Keep the area dry. If it gets wet, blot it dry with a clean towel. Surgical tape usually falls off within 7 to 10 days. If it has not fallen off after 10 days, you can take it off yourself. Put mineral oil or petroleum jelly on a cotton ball and gently rub the tape until it is removed.    Once you can get the wound wet, you may shower as usual, but don't soak the wound in water. This means no tub baths or swimming.    Even with proper treatment, a wound infection may sometimes occur. Check the wound daily for signs of infection listed below.  Follow-up care  Follow up with your healthcare provider, or as advised. If you have stitches, be sure to return as directed to have them removed.  When to seek medical advice  Call your healthcare provider right away if any of these occur:    Wound bleeding not controlled by direct pressure    Signs of infection, including increasing pain in the wound, increasing wound redness or swelling, or pus or bad odor coming from the wound    Fever of 100.4 F (38. C) o higher, or as directed by your healthcare provider    Stitches come apart or fall out or surgical tape falls off before 7 days    Wound edges reopen    Wound changes colors    Numbness or weakness in the affected hand     Decreased movement of the hand  [employer] last reviewed this educational content on 7/1/2017 2000-2020 [employer]. [address] [place] [address], [place] [address]. All rights reserved. This information is not intended as a substitute for professional medical care. Always follow your healthcare professional's instructions.

## 2020-12-09 NOTE — PROGRESS NOTES
SUBJECTIVE:     Chief Complaint   Patient presents with     Urgent Care     Laceration     laceration on palm of R hand- cut it this AM on a toy outside in the leaves      Jayro Elder is a 70 year old male who presents to the clinic with a laceration on the right palm/thenar sustained 1 1/2 hour(s) ago.  This is a non-work related and accidental injury.  Was outside cleaning, did not notice hidden toy under the leaves.    Mechanism of injury: plastic handle of toy.    On both Eliquis and Plavix, did bleed a lot initially but has stopped.  Washed it out at home.  Also has DM, on insulin.    Patient is right handed.    Associated symptoms: Denies numbness, weakness, or loss of function  Last tetanus booster within 10 years: yes, 2017    EXAM:   The patient appears today in alert,no apparent distress distress  VITALS: /64   Pulse 83   Temp 98.6  F (37  C) (Tympanic)   Wt 117 kg (258 lb)   SpO2 97%   BMI 31.40 kg/m      Size of laceration: 2 centimeters  Characteristics of the laceration: clean, flap type and semi-circular  Tendon function intact: yes  Sensation to light touch intact: yes  Pulses intact: yes  Picture included in patient's chart: no    Assessment:  Open wound of right palm    PLAN:  PROCEDURE NOTE::  Wound was locally injected with 6 cc's of Lidocaine 2% plain  Good anesthesia was obtained  Prepped and draped in the usual sterile fashion  Wound cleaned with betadine/saline solution  Wound cleaned with Shur-Clens  Laceration was closed using 5 4-0 nylon interrupted sutures  After care instructions:  Keep wound clean and dry for the next 24-48 hours  Sutures out in 10 days  Signs of infection discussed today  Apply anti-bacterial ointment for 7 days  Discussed the probability of scarring    Keflex given to prevent infection due to underlying uncontrolled diabetes.    Emmanuel Joe MD  December 9, 2020 2:55 PM

## 2020-12-18 ENCOUNTER — OFFICE VISIT (OUTPATIENT)
Dept: PEDIATRICS | Facility: CLINIC | Age: 70
End: 2020-12-18
Payer: COMMERCIAL

## 2020-12-18 ENCOUNTER — NURSE TRIAGE (OUTPATIENT)
Dept: NURSING | Facility: CLINIC | Age: 70
End: 2020-12-18

## 2020-12-18 VITALS
HEIGHT: 76 IN | HEART RATE: 51 BPM | TEMPERATURE: 97.5 F | BODY MASS INDEX: 31.45 KG/M2 | WEIGHT: 258.3 LBS | OXYGEN SATURATION: 99 % | SYSTOLIC BLOOD PRESSURE: 118 MMHG | RESPIRATION RATE: 16 BRPM | DIASTOLIC BLOOD PRESSURE: 80 MMHG

## 2020-12-18 DIAGNOSIS — T14.8XXA WOUND INFECTION: Primary | ICD-10-CM

## 2020-12-18 DIAGNOSIS — L08.9 WOUND INFECTION: Primary | ICD-10-CM

## 2020-12-18 PROCEDURE — 99213 OFFICE O/P EST LOW 20 MIN: CPT | Performed by: PEDIATRICS

## 2020-12-18 RX ORDER — DOXYCYCLINE 100 MG/1
100 CAPSULE ORAL 2 TIMES DAILY
Qty: 20 CAPSULE | Refills: 0 | Status: SHIPPED | OUTPATIENT
Start: 2020-12-18 | End: 2020-12-28

## 2020-12-18 ASSESSMENT — MIFFLIN-ST. JEOR: SCORE: 2033.14

## 2020-12-18 NOTE — PROGRESS NOTES
"Subjective     Jayro Elder is a 70 year old male who presents to clinic today for the following health issues:    Laceration    History of Present Illness       He eats 2-3 servings of fruits and vegetables daily.He consumes 0 sweetened beverage(s) daily.He exercises with enough effort to increase his heart rate 9 or less minutes per day.  He exercises with enough effort to increase his heart rate 3 or less days per week.   He is taking medications regularly.           Concern - laceration   Onset: x9 days   Description: right hand stiches--swollen-red, warm to the touch-painful   Intensity: moderate  Progression of Symptoms:  worsening  Accompanying Signs & Symptoms: na   Previous history of similar problem: no   Precipitating factors:        Worsened by: movement   Alleviating factors:        Improved by: icing   Therapies tried and outcome: antibiotics-with no relief     Patient was prescribed keflex for 7 days - should have been out two days ago, but brought in pill bottle and still has 5 pills left.  Admits that he probably forget the \"middle dose\" a few times.  He denies fevers/chills.  He states his wife made him come in.  He states last night the swelling was bigger, today seems a little better.         Review of Systems   Constitutional, HEENT, cardiovascular, pulmonary, gi and gu systems are negative, except as otherwise noted.      Objective    /80 (BP Location: Right arm, Patient Position: Chair, Cuff Size: Adult Regular)   Pulse 51   Temp 97.5  F (36.4  C) (Tympanic)   Resp 16   Ht 1.93 m (6' 4\")   Wt 117.2 kg (258 lb 4.8 oz)   SpO2 99%   BMI 31.44 kg/m    Body mass index is 31.44 kg/m .  Physical Exam   SKIN: right hand with sutured incision, c/d/i.  Surrounding pink erythema, no extending redness, mild tenderness, no drainage.  Radial pulse 2+ and cap refills 2+.        Assessment & Plan     Wound infection  Laceration - I believe increased swelling is because he is on multiple blood " thinners.  He has also not taken the prescribed antibiotics, I think 3x/day is not working - will try doxy with BID dosing and we talked about him putting this in the pill box with his other medications so he doesn't forget.  He will return as scheduled next week for suture removal.  He may benefit from hand therapy in the future given the location of this injury to ensure complete return of range of motion to right thumb.  - doxycycline hyclate (VIBRAMYCIN) 100 MG capsule; Take 1 capsule (100 mg) by mouth 2 times daily for 10 days        Patient Instructions   We will switch antibiotics - please stop the keflex and start doxycycline.  This will be TWICE daily.  Please point in your normal pill box so you don't forget.    Keep your appt for next week - see below.    In the mean time please continue with ice and elevation.        Return for Appt already scheduled.    Shannan Cardenas MD  [facility]

## 2020-12-18 NOTE — PATIENT INSTRUCTIONS
We will switch antibiotics - please stop the keflex and start doxycycline.  This will be TWICE daily.  Please point in your normal pill box so you don't forget.    Keep your appt for next week - see below.    In the mean time please continue with ice and elevation.

## 2020-12-18 NOTE — TELEPHONE ENCOUNTER
Wife Aixa reports that Jayro's right palm is swollen. Has discoloration - bright red. Painful to touch. Reports that all these symptoms have slightly improved, requests to be seen today.    No fever. Sutures are intact. Suture removal scheduled on Monday 12/21.    PLAN:  - Per protocol, advised office visit today. Care advice reviewed. Wife verbalizes understanding, transferred to . Advised to call back with further questions/concerns.     Prema Foster RN/[place] Nurse Advisor        Additional Information    Negative: Major abdominal surgical wound and visible internal organs    Negative: Sounds like a life-threatening emergency to the triager    Negative: Bleeding won't stop after 10 minutes of direct pressure (using correct technique)    Negative: Wound gaping open after sutures (or staples) AND < 48 hours since wound re-opened    Negative: Patient sounds very sick or weak to the triager    Negative: Major surgical wound that is starting to open up    Negative: Wound gaping open after sutures (or staples) AND > 48 hours since wound re-opened AND length of opening > 1/2 inch (12 mm)    Negative: Face wound gaping open after sutures (or staples) AND > 48 hours since wound re-opened AND length of opening > 1/4 inch (6 mm)    Negative: Suture or staple removal is overdue    Negative: Suture (or staple) came out early and > 48 hours since sutures placed, and caller wants wound checked    Patient wants to be seen    Protocols used: SUTURE OR STAPLE [id number]

## 2020-12-21 ENCOUNTER — OFFICE VISIT (OUTPATIENT)
Dept: PEDIATRICS | Facility: CLINIC | Age: 70
End: 2020-12-21
Payer: COMMERCIAL

## 2020-12-21 ENCOUNTER — ALLIED HEALTH/NURSE VISIT (OUTPATIENT)
Dept: PEDIATRICS | Facility: CLINIC | Age: 70
End: 2020-12-21
Payer: COMMERCIAL

## 2020-12-21 VITALS
DIASTOLIC BLOOD PRESSURE: 80 MMHG | HEART RATE: 96 BPM | OXYGEN SATURATION: 100 % | RESPIRATION RATE: 16 BRPM | TEMPERATURE: 97.1 F | SYSTOLIC BLOOD PRESSURE: 118 MMHG | HEIGHT: 76 IN | BODY MASS INDEX: 31.44 KG/M2

## 2020-12-21 DIAGNOSIS — T14.8XXA WOUND INFECTION: Primary | ICD-10-CM

## 2020-12-21 DIAGNOSIS — L08.9 WOUND INFECTION: Primary | ICD-10-CM

## 2020-12-21 DIAGNOSIS — L02.91 ABSCESS: Primary | ICD-10-CM

## 2020-12-21 PROCEDURE — 99207 PR NO CHARGE NURSE ONLY: CPT

## 2020-12-21 PROCEDURE — 99214 OFFICE O/P EST MOD 30 MIN: CPT | Performed by: INTERNAL MEDICINE

## 2020-12-21 NOTE — PROGRESS NOTES
Jayro Elder presents to the clinic for removal of sutures and sutures,staples, steri strips. The patient has had sutures in place for 12 days. 5  sutures and sutures,staples, staple, steri strips are seen and located on the right palm. Tetanus status is up to date.    Removed 3 sutures.  Wound started oozing.    Appt made for pt with a provider.  Huddled with provider    Thank you  Servando Tran RN on 12/21/2020 at 11:06 AM   Patient Care Team:  Cb Ray MD as PCP - General  Sybil Catalan  (Dermatology)    Patient ID: Nash Mendez is a 48year old male  here for reevaluation of multiple medical problems. HPI   He is here for evaluation after being diagnosed with Covid on november3, 2020, he returned to work on November 17. His only symptoms were a milld cough and loss of taste and smell   He is now feeling well and has  No complaints  He needs a note for work to work fulll time        [employer]: Denies   fever,  chills,  headaches,  chest pain,  cough,   shortness of breath  abdominal pain   nausea  vomiting  change in bowel habits  diarrhea  blood in the stool  depression  anxiety       Patient Active Problem List   Diagnosis   â¢ Asymptomatic hyperuricemia   â¢ Asymptomatic microscopic hematuria   â¢ BPH without obstruction/lower urinary tract symptoms   â¢ Encounter for screening colonoscopy   â¢ Essential hypertension   â¢ Flu vaccine need   â¢ Gout   â¢ Hyperlipidemia   â¢ Impotence   â¢ Need for DTP vaccine   â¢ Obesity   â¢ Rosacea   â¢ Snoring   â¢ Testicular hypofunction   â¢ Neck pain   â¢ Open wound of right hand   â¢ Need for shingles vaccine   â¢ COVID-19 virus detected         No past surgical history on file. ALLERGIES:  No Known Allergies    Current Outpatient Medications   Medication Sig Dispense Refill   â¢ allopurinol (ZYLOPRIM) 100 MG tablet TAKE 1 TABLET BY MOUTH EVERY DAY 90 tablet 1   â¢ losartan (COZAAR) 25 MG tablet TAKE 2 TABLETS BY MOUTH EVERY  tablet 0   â¢ tadalafil (CIALIS) 20 MG tablet TAKE 1 TABLET AS NEEDED 8 tablet 5   â¢ sulfamethoxazole-trimethoprim (BACTRIM DS,SEPTRA DS) 800-160 MG per tablet Take 1 tablet by mouth 2 times daily. 14 tablet 0     No current facility-administered medications for this visit. Social History     reports that he has never smoked. He has never used smokeless tobacco. He reports current alcohol use.     Review of patient's social economics indicates:  No social economics "status on file      No family history on file. Review of Systems   Constitutional: Negative for fatigue and fever. HENT: Negative for ear pain, facial swelling, sinus pressure, sore throat, tinnitus and trouble swallowing. Eyes: Negative for photophobia, pain, discharge and visual disturbance. Respiratory: Negative for chest tightness and shortness of breath. Cardiovascular: Negative for chest pain and palpitations. Gastrointestinal: Negative for abdominal pain, constipation, diarrhea, rectal pain and vomiting. Genitourinary: Negative for difficulty urinating, frequency, hematuria, penile pain, penile swelling, scrotal swelling and testicular pain. Musculoskeletal: Negative for arthralgias and myalgias. Skin: Negative for rash. Neurological: Negative for tremors, seizures, syncope, numbness and headaches. Hematological: Negative for adenopathy. Does not bruise/bleed easily. Psychiatric/Behavioral: Negative for confusion and sleep disturbance. Health Maintenance Topics with due status: Overdue       Topic Date Due    Shingles Vaccine 08/23/2017    Colorectal Cancer Screen- 08/23/2017    Influenza Vaccine 09/01/2020     Health Maintenance Topics with due status: Due Soon       Topic Date Due    Depression Screening 03/17/2021            Vitals:    12/04/20 1253   BP: (!) 197/91   Pulse: 68   Temp: 98.4 Â°F (36.9 Â°C)   Weight: 88.7 kg (195 lb 9.6 oz)   Height: 5' 6.5"" (1.689 m)   PainSc:  0           Physical Exam   Constitutional: He appears well-developed and well-nourished. HENT:   Head: Normocephalic and atraumatic. Right Ear: Hearing, tympanic membrane, external ear and ear canal normal.   Left Ear: Hearing, tympanic membrane, external ear and ear canal normal.   Nose: Nose normal. No mucosal edema. Mouth/Throat: Uvula is midline and oropharynx is clear and moist.   Eyes: Pupils are equal, round, and reactive to light. Conjunctivae are normal. Right eye exhibits no discharge.  " Left eye exhibits no discharge. Right conjunctiva has no hemorrhage. Left conjunctiva has no hemorrhage. Right eye exhibits no nystagmus. Left eye exhibits no nystagmus. Neck: Normal range of motion. Neck supple. Carotid bruit is not present. No edema and no erythema present. No thyromegaly present. Cardiovascular: Normal rate, regular rhythm, S1 normal, S2 normal and normal heart sounds. No murmur heard. Pulmonary/Chest: Effort normal and breath sounds normal. No respiratory distress. He has no wheezes. He has no rales. He exhibits no mass and no tenderness. Right breast exhibits no inverted nipple, no nipple discharge and no skin change. Left breast exhibits no inverted nipple, no nipple discharge and no skin change. Abdominal: Soft. Bowel sounds are normal. He exhibits no distension, no abdominal bruit and no mass. There is no splenomegaly or hepatomegaly. There is no abdominal tenderness. There is no CVA tenderness. Musculoskeletal: Normal range of motion. General: No edema. Lymphadenopathy:        Head (right side): No submental, no submandibular, no preauricular, no posterior auricular and no occipital adenopathy present. Head (left side): No submental, no submandibular, no preauricular, no posterior auricular and no occipital adenopathy present. He has no cervical adenopathy. Neurological: He is alert. He has normal strength. No cranial nerve deficit. Coordination normal.   Skin: No lesion and no rash noted. No erythema. No pallor. Psychiatric: He has a normal mood and affect. His speech is normal and behavior is normal. Judgment and thought content normal.             No visits with results within 6 Month(s) from this visit.    Latest known visit with results is:   Lab Services on 03/17/2020   Component Date Value Ref Range Status   â¢ URIC ACID 03/17/2020 6.5  3.5 - 7.2 mg/dL Final   â¢ TSH 03/17/2020 1.918  0.350 - 5.000 mcUnits/mL Final   â¢ PSA, Total 03/17/2020 0.76 <4.01 ng/mL Final   â¢ FASTING STATUS 03/17/2020 N/A  hrs Final   â¢ CHOLESTEROL 03/17/2020 188  <200 mg/dL Final   â¢ CALCULATED LDL 03/17/2020 125  <130 mg/dL Final   â¢ HDL 03/17/2020 49  >39 mg/dL Final   â¢ TRIGLYCERIDE 03/17/2020 71  <150 mg/dL Final   â¢ CALCULATED NON HDL 03/17/2020 139  mg/dL Final   â¢ CHOL/HDL 03/17/2020 3.8  <4.5 Final   â¢ Fasting Status 03/17/2020 N/A  hrs Final   â¢ Sodium 03/17/2020 143  135 - 145 mmol/L Final   â¢ Potassium 03/17/2020 4.0  3.4 - 5.1 mmol/L Final   â¢ Chloride 03/17/2020 105  98 - 107 mmol/L Final   â¢ Carbon Dioxide 03/17/2020 30  21 - 32 mmol/L Final   â¢ Anion Gap 03/17/2020 12  10 - 20 mmol/L Final   â¢ Glucose 03/17/2020 70  65 - 99 mg/dL Final   â¢ BUN 03/17/2020 20  6 - 20 mg/dL Final   â¢ Creatinine 03/17/2020 0.95  0.67 - 1.17 mg/dL Final   â¢ GFR Estimate,  03/17/2020 >90   Final   â¢ GFR Estimate, Non  03/17/2020 >90   Final   â¢ BUN/Creatinine Ratio 03/17/2020 21  7 - 25 Final   â¢ CALCIUM 03/17/2020 9.4  8.4 - 10.2 mg/dL Final   â¢ TOTAL BILIRUBIN 03/17/2020 0.5  0.2 - 1.0 mg/dL Final   â¢ AST/SGOT 03/17/2020 24  <38 Units/L Final   â¢ ALT/SGPT 03/17/2020 31  <64 Units/L Final   â¢ ALK PHOSPHATASE 03/17/2020 69  45 - 117 Units/L Final   â¢ TOTAL PROTEIN 03/17/2020 7.0  6.4 - 8.2 g/dL Final   â¢ Albumin 03/17/2020 3.9  3.6 - 5.1 g/dL Final   â¢ GLOBULIN 03/17/2020 3.1  2.0 - 4.0 g/dL Final   â¢ A/G Ratio, Serum 03/17/2020 1.3  1.0 - 2.4 Final   â¢ WBC 03/17/2020 5.5  4.2 - 11.0 K/mcL Final   â¢ RBC 03/17/2020 5.33  4.50 - 5.90 mil/mcL Final   â¢ HGB 03/17/2020 14.1  13.0 - 17.0 g/dL Final   â¢ HCT 03/17/2020 44.8  39.0 - 51.0 % Final   â¢ MCV 03/17/2020 84.1  78.0 - 100.0 fl Final   â¢ MCH 03/17/2020 26.5  26.0 - 34.0 pg Final   â¢ MCHC 03/17/2020 31.5* 32.0 - 36.5 g/dL Final   â¢ RDW-CV 03/17/2020 13.2  11.0 - 15.0 % Final   â¢ PLT 03/17/2020 163  140 - 450 K/mcL Final   â¢ NRBC 03/17/2020 0  0 /100 WBC Final   â¢ DIFF TYPE 03/17/2020 AUTOMATED DIFFERENTIAL Final   â¢ Neutrophil 03/17/2020 59  % Final   â¢ LYMPH 03/17/2020 31  % Final   â¢ MONO 03/17/2020 8  % Final   â¢ EOSIN 03/17/2020 2  % Final   â¢ BASO 03/17/2020 0  % Final   â¢ Percent Immature Granuloctyes 03/17/2020 0  % Final   â¢ Absolute Neutrophil 03/17/2020 3.3  1.8 - 7.7 K/mcL Final   â¢ Absolute Lymph 03/17/2020 1.7  1.0 - 4.0 K/mcL Final   â¢ Absolute Mono 03/17/2020 0.5  0.3 - 0.9 K/mcL Final   â¢ Absolute Eos 03/17/2020 0.1  0.1 - 0.5 K/mcL Final   â¢ Absolute Baso 03/17/2020 0.0  0.0 - 0.3 K/mcL Final   â¢ Absolute Immature Granulocytes 03/17/2020 0.0  0 - 0.2 K/mcl Final   â¢ COLOR 03/17/2020 YELLOW  YELLOW Final   â¢ APPEARANCE 03/17/2020 HAZY   Final   â¢ GLUCOSE(URINE) 03/17/2020 NEGATIVE  NEGATIVE mg/dL Final   â¢ BILIRUBIN 03/17/2020 NEGATIVE  NEGATIVE Final   â¢ KETONES 03/17/2020 NEGATIVE  NEGATIVE mg/dL Final   â¢ SPECIFIC GRAVITY 03/17/2020 1.017  1.005 - 1.030 Final   â¢ BLOOD 03/17/2020 NEGATIVE  NEGATIVE Final   â¢ pH 03/17/2020 7.0  5.0 - 7.0 Units Final   â¢ PROTEIN(URINE) 03/17/2020 NEGATIVE  NEGATIVE mg/dL Final   â¢ UROBILINOGEN 03/17/2020 0.2  0.0 - 1.0 mg/dL Final   â¢ NITRITE 03/17/2020 NEGATIVE  NEGATIVE Final   â¢ LEUKOCYTE ESTERASE 03/17/2020 NEGATIVE  NEGATIVE Final   â¢ Squamous EPI'S 03/17/2020 1 to 5  0 - 5 /hpf Final   â¢ RBC 03/17/2020 3 to 5  0 - 2 /hpf Final   â¢ WBC 03/17/2020 NONE SEEN  0 - 5 /hpf Final   â¢ BACTERIA 03/17/2020 NONE SEEN  NONE SEEN /hpf Final   â¢ Hyaline Casts 03/17/2020 NONE SEEN  0 - 5 /lpf Final   â¢ SPECIMEN TYPE 03/17/2020 URINE, CLEAN CATCH/MIDSTREAM   Final   â¢ MUCOUS 03/17/2020 PRESENT   Final       .  Problem List Items Addressed This Visit     COVID-19 virus detected     He is now fully recovered.   Note writtten so he can return to work          Encounter for screening colonoscopy     Declines referral for a screening colonoscopy          Essential hypertension - Primary     Well controlled and in goal, healthy eating, weight loss and daily exercise Check home SMBPs and make a return appt in 4 weeks,  If ome SMBPs are high then increase the losartan, low salt diet,          Relevant Medications    losartan (COZAAR) 50 MG tablet    Hyperlipidemia     wiithin goal          Relevant Medications    losartan (COZAAR) 50 MG tablet    Need for shingles vaccine     Declines shingles vaccine               Patient Instructions           I recommend that you eat a low salt, low fat diet, lose weight and get 30 minutes of aerobic exercised daily     .

## 2020-12-21 NOTE — PROGRESS NOTES
"Subjective     Jayorfidel Elder is a 70 year old male who presents to clinic today for the following health issues:    HPI         Pt came in to get sutures removed, and while removing the sutures it started oozing. The swelling has went down from last week but still red, and warm to the touch.      He notes that pain and swelling are better than Friday, when he was placed on doxy.  Has been taking doxy (unlike previous antibiotic which he never filled).     Review of Systems   CONSTITUTIONAL: NEGATIVE for fever, chills, change in weight  INTEGUMENTARY/SKIN: NEGATIVE for worrisome rashes, moles or lesions  EYES: NEGATIVE for vision changes or irritation  ENT/MOUTH: NEGATIVE for ear, mouth and throat problems  RESP: NEGATIVE for significant cough or SOB  BREAST: NEGATIVE for masses, tenderness or discharge  CV: NEGATIVE for chest pain, palpitations or peripheral edema  GI: NEGATIVE for nausea, abdominal pain, heartburn, or change in bowel habits  : NEGATIVE for frequency, dysuria, or hematuria  MUSCULOSKELETAL: NEGATIVE for significant arthralgias or myalgia  NEURO: NEGATIVE for weakness, dizziness or paresthesias  ENDOCRINE: NEGATIVE for temperature intolerance, skin/hair changes  HEME: NEGATIVE for bleeding problems  PSYCHIATRIC: NEGATIVE for changes in mood or affect      Objective    /80 (BP Location: Right arm, Patient Position: Chair, Cuff Size: Adult Regular)   Pulse 96   Temp 97.1  F (36.2  C) (Tympanic)   Resp 16   Ht 1.93 m (6' 4\")   SpO2 100%   BMI 31.44 kg/m    Body mass index is 31.44 kg/m .  Physical Exam   GENERAL: healthy, alert and no distress  MS: no gross musculoskeletal defects noted, no edema.  Some induration at suture site, and some drainage of slighly cloudy fluid.   SKIN: no suspicious lesions or rashes  NEURO: Normal strength and tone, mentation intact and speech normal  PSYCH: mentation appears normal, affect normal/bright                 Assessment & Plan     Infected " laceration:    All stitches were taken out today.  It appears that he has a small evolving infection at this suture site.  We recommended that he soak his hand 4 times a day for 15 minutes, and elevate his hand above his heart when he is not soaking it.  He states that the doxycycline is actually helped over the last 72 hours, and that the previous antibiotic that he was given he never filled.  Therefore I am comfortable with him continuing on the doxycycline to see if this continues to help.  We will contact us later this week if his symptoms get worse, and go to the emergency room should he develop any fevers or red streaking.  He was comfortable with this plan.        Patient Instructions   Sutures taken out today.,    Soak your hand in warm or hot soapy water four times daily for 15 min.    Elevate hand above heart for rest of the day and night.    Follow up if not continuing to improve into next week.    Go to emergency room for any new fevers, chills, or red streaking up the arm.     Justin Kendall MD  Internal Medicine and Pediatrics         Return in about 1 week (around 12/28/2020), or if symptoms worsen or fail to improve, for medicine followup, with any available provider, in person.    Justin Kendall MD  [facility]

## 2020-12-22 ENCOUNTER — OFFICE VISIT (OUTPATIENT)
Dept: PODIATRY | Facility: CLINIC | Age: 70
End: 2020-12-22
Payer: COMMERCIAL

## 2020-12-22 VITALS
WEIGHT: 258 LBS | DIASTOLIC BLOOD PRESSURE: 72 MMHG | BODY MASS INDEX: 31.42 KG/M2 | SYSTOLIC BLOOD PRESSURE: 108 MMHG | HEIGHT: 76 IN

## 2020-12-22 DIAGNOSIS — Z89.421 H/O AMPUTATION OF LESSER TOE, RIGHT (H): ICD-10-CM

## 2020-12-22 DIAGNOSIS — L97.522 DIABETIC ULCER OF OTHER PART OF LEFT FOOT ASSOCIATED WITH TYPE 2 DIABETES MELLITUS, WITH FAT LAYER EXPOSED (H): ICD-10-CM

## 2020-12-22 DIAGNOSIS — M20.42 HAMMER TOES OF BOTH FEET: ICD-10-CM

## 2020-12-22 DIAGNOSIS — M20.41 HAMMER TOES OF BOTH FEET: ICD-10-CM

## 2020-12-22 DIAGNOSIS — E11.42 DIABETIC POLYNEUROPATHY ASSOCIATED WITH TYPE 2 DIABETES MELLITUS (H): Primary | ICD-10-CM

## 2020-12-22 DIAGNOSIS — L84 PRE-ULCERATIVE CALLUSES: ICD-10-CM

## 2020-12-22 DIAGNOSIS — E11.621 DIABETIC ULCER OF OTHER PART OF LEFT FOOT ASSOCIATED WITH TYPE 2 DIABETES MELLITUS, WITH FAT LAYER EXPOSED (H): ICD-10-CM

## 2020-12-22 PROCEDURE — 11042 DBRDMT SUBQ TIS 1ST 20SQCM/<: CPT | Performed by: PODIATRIST

## 2020-12-22 PROCEDURE — 99213 OFFICE O/P EST LOW 20 MIN: CPT | Mod: 25 | Performed by: PODIATRIST

## 2020-12-22 PROCEDURE — 11056 PARNG/CUTG B9 HYPRKR LES 2-4: CPT | Mod: 59 | Performed by: PODIATRIST

## 2020-12-22 ASSESSMENT — MIFFLIN-ST. JEOR: SCORE: 2031.78

## 2020-12-22 NOTE — LETTER
"    12/22/2020         RE: Jayro Elder  [address] [place] [address]        Dear Colleague,    Thank you for referring your patient, Jayro Elder, to the [facility] PODIATRY. Please see a copy of my visit note below.    Podiatry / Foot and Ankle Surgery Progress Note    December 22, 2020    Subject: Patient was seen for follow up on ulcer to the left foot.  Patient notes that he also cut his right toe 2 days ago.  Did not feel anything due to his neuropathy.  Denies fever, nausea, vomiting.  No concerns today.    Objective:  Vitals: /72   Ht 1.93 m (6' 4\")   Wt 117 kg (258 lb)   BMI 31.40 kg/m    BMI= Body mass index is 31.4 kg/m .    A1C: 9.8 (9/2020)     General:  Patient is alert and orientated.  NAD     Vascular:  DP and PT pulses are palpable.  No varicosities noted  CFT's < 3secs.  Skin temp is normal     Neuro:  Light and gross touch sensation absent to feet.      Derm: Thickened pre-ulcerative hyperkeratotic lesion to the plantar aspect of the left first metatarsal head.  Upon debridement there is a small partial-thickness ulcer with the fat layer exposed measuring 1.0cm x 1.0cm x 0.2cm..  No surrounding erythema purulent drainage or malodor noted..   Heavy clear serous drainage is noted with maceration around the ulcer.  No acute signs of infection noted.  Preulcerative hyperkeratotic lesion to the distal aspect of the right third toe.  No open lesion on debridement.  Small pressure area noted to the lateral right fifth met head.  Nothing is open.  Stage I pressure area.    Preulcerative hyperkeratotic lesion to the plantar aspect of the right first metatarsal head and IPJ.  No open lesions on debridement.  Patient does have a new cut on his right fifth toe.  This is superficial.  No redness or signs of acute infection noted.     Musculoskeletal: multiple previous toe amputations right foot.  Left second toe amputated previously.     Assessment: "   Diabetic polyneuropathy associated with type 2 diabetes mellitus (H)  Diabetic ulcer of other part of left foot associated with type 2 diabetes mellitus, with fat layer exposed (H)  Pre-ulcerative calluses  Hammer toes of both feet  H/O amputation of lesser toe, right (H)      Plan: At this time, the wound and preulcerative calluses were debrided.      He will continue iodoform dressing changes to the left foot ulcer.  We will have him put 4 x 4 gauze over the lateral left foot pressure area.  He notes that he does not need any more dressing supplies today.   Recommend that he lotion the right foot at least daily to help with the preulcerative calluses. We will have him follow-up in 1 month.  He will continue wearing his offloading shoe.  He was told to call further questions or concerns.     Procedure: After verbal consent, excisional debridement was performed on ulcer.  #15 blade was used to debride ulcer down to and including subcutaneous tissue. Bleeding controlled with light pressure.   No drainage noted.  No anesthesia was used due to neuropathy. Dry dressing applied to foot.  Patient tolerated procedure well.    Procedure 2:  After verbal consent, the hyperkeratotic lesions were debrided using a #15 blade without incident. Patient tolerated procedure well.       Татьяна Mckeon DPM, Podiatry/Foot and Ankle Surgery    Weight management plan: Patient was referred to their PCP to discuss a diet and exercise plan.    Recommended to Jayro Elder to follow up with Primary Care provider regarding elevated blood pressure.        Again, thank you for allowing me to participate in the care of your patient.        Sincerely,        Татьяна Mcekon DPM, Podiatry/Foot and Ankle Surgery

## 2020-12-22 NOTE — PROGRESS NOTES
"Podiatry / Foot and Ankle Surgery Progress Note    December 22, 2020    Subject: Patient was seen for follow up on ulcer to the left foot.  Patient notes that he also cut his right toe 2 days ago.  Did not feel anything due to his neuropathy.  Denies fever, nausea, vomiting.  No concerns today.    Objective:  Vitals: /72   Ht 1.93 m (6' 4\")   Wt 117 kg (258 lb)   BMI 31.40 kg/m    BMI= Body mass index is 31.4 kg/m .    A1C: 9.8 (9/2020)     General:  Patient is alert and orientated.  NAD     Vascular:  DP and PT pulses are palpable.  No varicosities noted  CFT's < 3secs.  Skin temp is normal     Neuro:  Light and gross touch sensation absent to feet.      Derm: Thickened pre-ulcerative hyperkeratotic lesion to the plantar aspect of the left first metatarsal head.  Upon debridement there is a small partial-thickness ulcer with the fat layer exposed measuring 1.0cm x 1.0cm x 0.2cm..  No surrounding erythema purulent drainage or malodor noted..   Heavy clear serous drainage is noted with maceration around the ulcer.  No acute signs of infection noted.  Preulcerative hyperkeratotic lesion to the distal aspect of the right third toe.  No open lesion on debridement.  Small pressure area noted to the lateral right fifth met head.  Nothing is open.  Stage I pressure area.    Preulcerative hyperkeratotic lesion to the plantar aspect of the right first metatarsal head and IPJ.  No open lesions on debridement.  Patient does have a new cut on his right fifth toe.  This is superficial.  No redness or signs of acute infection noted.     Musculoskeletal: multiple previous toe amputations right foot.  Left second toe amputated previously.     Assessment:   Diabetic polyneuropathy associated with type 2 diabetes mellitus (H)  Diabetic ulcer of other part of left foot associated with type 2 diabetes mellitus, with fat layer exposed (H)  Pre-ulcerative calluses  Hammer toes of both feet  H/O amputation of lesser toe, right " (H)      Plan: At this time, the wound and preulcerative calluses were debrided.      He will continue iodoform dressing changes to the left foot ulcer.  We will have him put 4 x 4 gauze over the lateral left foot pressure area.  He notes that he does not need any more dressing supplies today.   Recommend that he lotion the right foot at least daily to help with the preulcerative calluses. We will have him follow-up in 1 month.  He will continue wearing his offloading shoe.  He was told to call further questions or concerns.     Procedure: After verbal consent, excisional debridement was performed on ulcer.  #15 blade was used to debride ulcer down to and including subcutaneous tissue. Bleeding controlled with light pressure.   No drainage noted.  No anesthesia was used due to neuropathy. Dry dressing applied to foot.  Patient tolerated procedure well.    Procedure 2:  After verbal consent, the hyperkeratotic lesions were debrided using a #15 blade without incident. Patient tolerated procedure well.       Татьяна Mckeon DPM, Podiatry/Foot and Ankle Surgery    Weight management plan: Patient was referred to their PCP to discuss a diet and exercise plan.    Recommended to Jayro Elder to follow up with Primary Care provider regarding elevated blood pressure.

## 2020-12-22 NOTE — PATIENT INSTRUCTIONS
Thank you for choosing [facility] Podiatry / Foot & Ankle Surgery!    DR. ASENCIO'S CLINIC:  [facility]   [address]   [place] [address]   [phone]  [phone]      SCHEDULE SURGERY: [phone]   BILLING QUESTIONS: [phone]   AFTER HOURS: [phone]   APPOINTMENTS: [phone]   [facility] LINE: [phone]      Follow up: 1 month         Please read through the following handouts and if you have any questions, please feel free to call us or send a [employer] message!

## 2021-01-03 ENCOUNTER — HOSPITAL ENCOUNTER (OUTPATIENT)
Facility: CLINIC | Age: 71
Setting detail: OBSERVATION
Discharge: HOME | End: 2021-01-04
Attending: EMERGENCY MEDICINE | Admitting: INTERNAL MEDICINE
Payer: COMMERCIAL

## 2021-01-03 ENCOUNTER — APPOINTMENT (OUTPATIENT)
Dept: GENERAL RADIOLOGY | Facility: CLINIC | Age: 71
End: 2021-01-03
Attending: EMERGENCY MEDICINE
Payer: COMMERCIAL

## 2021-01-03 DIAGNOSIS — I95.1 ORTHOSTATIC HYPOTENSION: Primary | ICD-10-CM

## 2021-01-03 DIAGNOSIS — Z79.4 TYPE 2 DIABETES MELLITUS WITH DIABETIC PERIPHERAL ANGIOPATHY WITHOUT GANGRENE, WITH LONG-TERM CURRENT USE OF INSULIN (H): ICD-10-CM

## 2021-01-03 DIAGNOSIS — I25.5 ISCHEMIC CARDIOMYOPATHY: ICD-10-CM

## 2021-01-03 DIAGNOSIS — E11.51 TYPE 2 DIABETES MELLITUS WITH DIABETIC PERIPHERAL ANGIOPATHY WITHOUT GANGRENE, WITH LONG-TERM CURRENT USE OF INSULIN (H): ICD-10-CM

## 2021-01-03 DIAGNOSIS — I20.0 UNSTABLE ANGINA (H): ICD-10-CM

## 2021-01-03 DIAGNOSIS — I25.10 CAD (CORONARY ARTERY DISEASE): ICD-10-CM

## 2021-01-03 DIAGNOSIS — R42 DIZZINESS: ICD-10-CM

## 2021-01-03 LAB
ANION GAP SERPL CALCULATED.3IONS-SCNC: 6 MMOL/L (ref 3–14)
BASOPHILS # BLD AUTO: 0 10E9/L (ref 0–0.2)
BASOPHILS NFR BLD AUTO: 0.1 %
BUN SERPL-MCNC: 32 MG/DL (ref 7–30)
CALCIUM SERPL-MCNC: 9.1 MG/DL (ref 8.5–10.1)
CHLORIDE SERPL-SCNC: 95 MMOL/L (ref 94–109)
CO2 SERPL-SCNC: 27 MMOL/L (ref 20–32)
CREAT SERPL-MCNC: 1.3 MG/DL (ref 0.66–1.25)
D DIMER PPP FEU-MCNC: 0.3 UG/ML FEU (ref 0–0.5)
DIFFERENTIAL METHOD BLD: NORMAL
EOSINOPHIL # BLD AUTO: 0.2 10E9/L (ref 0–0.7)
EOSINOPHIL NFR BLD AUTO: 1.9 %
ERYTHROCYTE [DISTWIDTH] IN BLOOD BY AUTOMATED COUNT: 12.1 % (ref 10–15)
GFR SERPL CREATININE-BSD FRML MDRD: 55 ML/MIN/{1.73_M2}
GLUCOSE BLDC GLUCOMTR-MCNC: 322 MG/DL (ref 70–99)
GLUCOSE SERPL-MCNC: 473 MG/DL (ref 70–99)
HCT VFR BLD AUTO: 43.2 % (ref 40–53)
HGB BLD-MCNC: 15 G/DL (ref 13.3–17.7)
IMM GRANULOCYTES # BLD: 0 10E9/L (ref 0–0.4)
IMM GRANULOCYTES NFR BLD: 0.2 %
INR PPP: 1.19 (ref 0.86–1.14)
LABORATORY COMMENT REPORT: NORMAL
LYMPHOCYTES # BLD AUTO: 1.2 10E9/L (ref 0.8–5.3)
LYMPHOCYTES NFR BLD AUTO: 14.1 %
MCH RBC QN AUTO: 30.3 PG (ref 26.5–33)
MCHC RBC AUTO-ENTMCNC: 34.7 G/DL (ref 31.5–36.5)
MCV RBC AUTO: 87 FL (ref 78–100)
MONOCYTES # BLD AUTO: 0.5 10E9/L (ref 0–1.3)
MONOCYTES NFR BLD AUTO: 5.9 %
NEUTROPHILS # BLD AUTO: 6.5 10E9/L (ref 1.6–8.3)
NEUTROPHILS NFR BLD AUTO: 77.8 %
NRBC # BLD AUTO: 0 10*3/UL
NRBC BLD AUTO-RTO: 0 /100
NT-PROBNP SERPL-MCNC: 368 PG/ML (ref 0–900)
PLATELET # BLD AUTO: 242 10E9/L (ref 150–450)
POTASSIUM SERPL-SCNC: 4.5 MMOL/L (ref 3.4–5.3)
RBC # BLD AUTO: 4.95 10E12/L (ref 4.4–5.9)
SARS-COV-2 RNA SPEC QL NAA+PROBE: NEGATIVE
SODIUM SERPL-SCNC: 128 MMOL/L (ref 133–144)
SPECIMEN SOURCE: NORMAL
TROPONIN I SERPL-MCNC: <0.015 UG/L (ref 0–0.04)
TROPONIN I SERPL-MCNC: <0.015 UG/L (ref 0–0.04)
WBC # BLD AUTO: 8.3 10E9/L (ref 4–11)

## 2021-01-03 PROCEDURE — 96360 HYDRATION IV INFUSION INIT: CPT

## 2021-01-03 PROCEDURE — 80048 BASIC METABOLIC PNL TOTAL CA: CPT | Performed by: EMERGENCY MEDICINE

## 2021-01-03 PROCEDURE — 258N000003 HC RX IP 258 OP 636: Performed by: EMERGENCY MEDICINE

## 2021-01-03 PROCEDURE — 250N000012 HC RX MED GY IP 250 OP 636 PS 637: Performed by: INTERNAL MEDICINE

## 2021-01-03 PROCEDURE — 84484 ASSAY OF TROPONIN QUANT: CPT | Performed by: EMERGENCY MEDICINE

## 2021-01-03 PROCEDURE — C9803 HOPD COVID-19 SPEC COLLECT: HCPCS

## 2021-01-03 PROCEDURE — G0378 HOSPITAL OBSERVATION PER HR: HCPCS

## 2021-01-03 PROCEDURE — 84484 ASSAY OF TROPONIN QUANT: CPT | Mod: 91 | Performed by: INTERNAL MEDICINE

## 2021-01-03 PROCEDURE — 99285 EMERGENCY DEPT VISIT HI MDM: CPT | Mod: 25

## 2021-01-03 PROCEDURE — 71045 X-RAY EXAM CHEST 1 VIEW: CPT

## 2021-01-03 PROCEDURE — 85379 FIBRIN DEGRADATION QUANT: CPT | Performed by: EMERGENCY MEDICINE

## 2021-01-03 PROCEDURE — 36415 COLL VENOUS BLD VENIPUNCTURE: CPT | Performed by: INTERNAL MEDICINE

## 2021-01-03 PROCEDURE — 87635 SARS-COV-2 COVID-19 AMP PRB: CPT | Performed by: EMERGENCY MEDICINE

## 2021-01-03 PROCEDURE — 83880 ASSAY OF NATRIURETIC PEPTIDE: CPT | Performed by: EMERGENCY MEDICINE

## 2021-01-03 PROCEDURE — 96372 THER/PROPH/DIAG INJ SC/IM: CPT | Performed by: INTERNAL MEDICINE

## 2021-01-03 PROCEDURE — 999N001017 HC STATISTIC GLUCOSE BY METER IP

## 2021-01-03 PROCEDURE — 99285 EMERGENCY DEPT VISIT HI MDM: CPT

## 2021-01-03 PROCEDURE — 250N000013 HC RX MED GY IP 250 OP 250 PS 637: Performed by: INTERNAL MEDICINE

## 2021-01-03 PROCEDURE — 99220 PR INITIAL OBSERVATION CARE,LEVEL III: CPT | Performed by: INTERNAL MEDICINE

## 2021-01-03 PROCEDURE — 85025 COMPLETE CBC W/AUTO DIFF WBC: CPT | Performed by: EMERGENCY MEDICINE

## 2021-01-03 PROCEDURE — 93005 ELECTROCARDIOGRAM TRACING: CPT

## 2021-01-03 PROCEDURE — 85610 PROTHROMBIN TIME: CPT | Performed by: EMERGENCY MEDICINE

## 2021-01-03 RX ORDER — ACETAMINOPHEN 325 MG/1
650 TABLET ORAL EVERY 4 HOURS PRN
Status: DISCONTINUED | OUTPATIENT
Start: 2021-01-03 | End: 2021-01-04 | Stop reason: HOSPADM

## 2021-01-03 RX ORDER — NITROGLYCERIN 0.4 MG/1
0.4 TABLET SUBLINGUAL EVERY 5 MIN PRN
Status: DISCONTINUED | OUTPATIENT
Start: 2021-01-03 | End: 2021-01-04 | Stop reason: HOSPADM

## 2021-01-03 RX ORDER — DEXTROSE MONOHYDRATE 25 G/50ML
25-50 INJECTION, SOLUTION INTRAVENOUS
Status: DISCONTINUED | OUTPATIENT
Start: 2021-01-03 | End: 2021-01-04 | Stop reason: HOSPADM

## 2021-01-03 RX ORDER — ONDANSETRON 2 MG/ML
4 INJECTION INTRAMUSCULAR; INTRAVENOUS EVERY 6 HOURS PRN
Status: DISCONTINUED | OUTPATIENT
Start: 2021-01-03 | End: 2021-01-04 | Stop reason: HOSPADM

## 2021-01-03 RX ORDER — ONDANSETRON 4 MG/1
4 TABLET, ORALLY DISINTEGRATING ORAL EVERY 6 HOURS PRN
Status: DISCONTINUED | OUTPATIENT
Start: 2021-01-03 | End: 2021-01-04 | Stop reason: HOSPADM

## 2021-01-03 RX ORDER — GABAPENTIN 300 MG/1
CAPSULE ORAL
Status: ON HOLD | COMMUNITY
End: 2021-04-09

## 2021-01-03 RX ORDER — GABAPENTIN 300 MG/1
300 CAPSULE ORAL AT BEDTIME
Status: DISCONTINUED | OUTPATIENT
Start: 2021-01-03 | End: 2021-01-04 | Stop reason: HOSPADM

## 2021-01-03 RX ORDER — ISOSORBIDE MONONITRATE 60 MG/1
60 TABLET, EXTENDED RELEASE ORAL DAILY
Status: DISCONTINUED | OUTPATIENT
Start: 2021-01-04 | End: 2021-01-04

## 2021-01-03 RX ORDER — POLYETHYLENE GLYCOL 3350 17 G/17G
17 POWDER, FOR SOLUTION ORAL DAILY
Status: DISCONTINUED | OUTPATIENT
Start: 2021-01-04 | End: 2021-01-04 | Stop reason: HOSPADM

## 2021-01-03 RX ORDER — PANTOPRAZOLE SODIUM 40 MG/1
40 TABLET, DELAYED RELEASE ORAL
Status: DISCONTINUED | OUTPATIENT
Start: 2021-01-04 | End: 2021-01-04 | Stop reason: HOSPADM

## 2021-01-03 RX ORDER — GABAPENTIN 300 MG/1
300 CAPSULE ORAL AT BEDTIME
Status: ON HOLD | COMMUNITY
End: 2021-04-09

## 2021-01-03 RX ORDER — ATORVASTATIN CALCIUM 40 MG/1
40 TABLET, FILM COATED ORAL EVERY EVENING
Status: DISCONTINUED | OUTPATIENT
Start: 2021-01-03 | End: 2021-01-04 | Stop reason: HOSPADM

## 2021-01-03 RX ORDER — NICOTINE POLACRILEX 4 MG
15-30 LOZENGE BUCCAL
Status: DISCONTINUED | OUTPATIENT
Start: 2021-01-03 | End: 2021-01-04 | Stop reason: HOSPADM

## 2021-01-03 RX ORDER — AMOXICILLIN 250 MG
2 CAPSULE ORAL 2 TIMES DAILY
Status: DISCONTINUED | OUTPATIENT
Start: 2021-01-03 | End: 2021-01-04 | Stop reason: HOSPADM

## 2021-01-03 RX ORDER — AMOXICILLIN 250 MG
1 CAPSULE ORAL 2 TIMES DAILY
Status: DISCONTINUED | OUTPATIENT
Start: 2021-01-03 | End: 2021-01-04 | Stop reason: HOSPADM

## 2021-01-03 RX ORDER — ACETAMINOPHEN 650 MG/1
650 SUPPOSITORY RECTAL EVERY 4 HOURS PRN
Status: DISCONTINUED | OUTPATIENT
Start: 2021-01-03 | End: 2021-01-04 | Stop reason: HOSPADM

## 2021-01-03 RX ORDER — CLOPIDOGREL BISULFATE 75 MG/1
75 TABLET ORAL DAILY
Status: DISCONTINUED | OUTPATIENT
Start: 2021-01-04 | End: 2021-01-04 | Stop reason: HOSPADM

## 2021-01-03 RX ORDER — LEVOTHYROXINE SODIUM 137 UG/1
137 TABLET ORAL EVERY EVENING
Status: DISCONTINUED | OUTPATIENT
Start: 2021-01-03 | End: 2021-01-04 | Stop reason: HOSPADM

## 2021-01-03 RX ADMIN — SODIUM CHLORIDE 500 ML: 9 INJECTION, SOLUTION INTRAVENOUS at 19:08

## 2021-01-03 RX ADMIN — INSULIN ASPART 3 UNITS: 100 INJECTION, SOLUTION INTRAVENOUS; SUBCUTANEOUS at 23:09

## 2021-01-03 RX ADMIN — LEVOTHYROXINE SODIUM 137 MCG: 137 TABLET ORAL at 22:12

## 2021-01-03 RX ADMIN — INSULIN ASPART 12 UNITS: 100 INJECTION, SOLUTION INTRAVENOUS; SUBCUTANEOUS at 23:08

## 2021-01-03 RX ADMIN — ATORVASTATIN CALCIUM 40 MG: 40 TABLET, FILM COATED ORAL at 22:12

## 2021-01-03 RX ADMIN — APIXABAN 5 MG: 5 TABLET, FILM COATED ORAL at 22:12

## 2021-01-03 RX ADMIN — GABAPENTIN 300 MG: 300 CAPSULE ORAL at 22:12

## 2021-01-03 ASSESSMENT — MIFFLIN-ST. JEOR: SCORE: 1985.06

## 2021-01-03 ASSESSMENT — ENCOUNTER SYMPTOMS
LIGHT-HEADEDNESS: 1
FEVER: 0
COUGH: 0

## 2021-01-04 ENCOUNTER — APPOINTMENT (OUTPATIENT)
Dept: CARDIOLOGY | Facility: CLINIC | Age: 71
End: 2021-01-04
Attending: INTERNAL MEDICINE
Payer: COMMERCIAL

## 2021-01-04 VITALS
DIASTOLIC BLOOD PRESSURE: 73 MMHG | SYSTOLIC BLOOD PRESSURE: 127 MMHG | HEIGHT: 76 IN | WEIGHT: 247.7 LBS | RESPIRATION RATE: 18 BRPM | OXYGEN SATURATION: 97 % | BODY MASS INDEX: 30.16 KG/M2 | TEMPERATURE: 98 F | HEART RATE: 75 BPM

## 2021-01-04 LAB
ANION GAP SERPL CALCULATED.3IONS-SCNC: 2 MMOL/L (ref 3–14)
BASOPHILS # BLD AUTO: 0 10E9/L (ref 0–0.2)
BASOPHILS NFR BLD AUTO: 0.5 %
BUN SERPL-MCNC: 25 MG/DL (ref 7–30)
CALCIUM SERPL-MCNC: 8.9 MG/DL (ref 8.5–10.1)
CHLORIDE SERPL-SCNC: 102 MMOL/L (ref 94–109)
CO2 SERPL-SCNC: 30 MMOL/L (ref 20–32)
CREAT SERPL-MCNC: 1.06 MG/DL (ref 0.66–1.25)
DIFFERENTIAL METHOD BLD: NORMAL
EOSINOPHIL # BLD AUTO: 0.2 10E9/L (ref 0–0.7)
EOSINOPHIL NFR BLD AUTO: 3.3 %
ERYTHROCYTE [DISTWIDTH] IN BLOOD BY AUTOMATED COUNT: 12.1 % (ref 10–15)
GFR SERPL CREATININE-BSD FRML MDRD: 71 ML/MIN/{1.73_M2}
GLUCOSE BLDC GLUCOMTR-MCNC: 245 MG/DL (ref 70–99)
GLUCOSE BLDC GLUCOMTR-MCNC: 261 MG/DL (ref 70–99)
GLUCOSE BLDC GLUCOMTR-MCNC: 291 MG/DL (ref 70–99)
GLUCOSE SERPL-MCNC: 243 MG/DL (ref 70–99)
HCT VFR BLD AUTO: 40.7 % (ref 40–53)
HGB BLD-MCNC: 13.9 G/DL (ref 13.3–17.7)
IMM GRANULOCYTES # BLD: 0 10E9/L (ref 0–0.4)
IMM GRANULOCYTES NFR BLD: 0.3 %
INTERPRETATION ECG - MUSE: NORMAL
LYMPHOCYTES # BLD AUTO: 1.6 10E9/L (ref 0.8–5.3)
LYMPHOCYTES NFR BLD AUTO: 26.1 %
MCH RBC QN AUTO: 30.1 PG (ref 26.5–33)
MCHC RBC AUTO-ENTMCNC: 34.2 G/DL (ref 31.5–36.5)
MCV RBC AUTO: 88 FL (ref 78–100)
MONOCYTES # BLD AUTO: 0.5 10E9/L (ref 0–1.3)
MONOCYTES NFR BLD AUTO: 8.5 %
NEUTROPHILS # BLD AUTO: 3.8 10E9/L (ref 1.6–8.3)
NEUTROPHILS NFR BLD AUTO: 61.3 %
NRBC # BLD AUTO: 0 10*3/UL
NRBC BLD AUTO-RTO: 0 /100
PLATELET # BLD AUTO: 210 10E9/L (ref 150–450)
POTASSIUM SERPL-SCNC: 3.6 MMOL/L (ref 3.4–5.3)
RBC # BLD AUTO: 4.62 10E12/L (ref 4.4–5.9)
SODIUM SERPL-SCNC: 134 MMOL/L (ref 133–144)
TROPONIN I SERPL-MCNC: <0.015 UG/L (ref 0–0.04)
WBC # BLD AUTO: 6.1 10E9/L (ref 4–11)

## 2021-01-04 PROCEDURE — 85025 COMPLETE CBC W/AUTO DIFF WBC: CPT | Performed by: INTERNAL MEDICINE

## 2021-01-04 PROCEDURE — 258N000003 HC RX IP 258 OP 636: Performed by: PHYSICIAN ASSISTANT

## 2021-01-04 PROCEDURE — 93306 TTE W/DOPPLER COMPLETE: CPT | Mod: 26 | Performed by: INTERNAL MEDICINE

## 2021-01-04 PROCEDURE — 99214 OFFICE O/P EST MOD 30 MIN: CPT | Performed by: INTERNAL MEDICINE

## 2021-01-04 PROCEDURE — 250N000013 HC RX MED GY IP 250 OP 250 PS 637: Performed by: INTERNAL MEDICINE

## 2021-01-04 PROCEDURE — 250N000012 HC RX MED GY IP 250 OP 636 PS 637: Performed by: INTERNAL MEDICINE

## 2021-01-04 PROCEDURE — 96361 HYDRATE IV INFUSION ADD-ON: CPT

## 2021-01-04 PROCEDURE — 255N000002 HC RX 255 OP 636: Performed by: INTERNAL MEDICINE

## 2021-01-04 PROCEDURE — G0378 HOSPITAL OBSERVATION PER HR: HCPCS

## 2021-01-04 PROCEDURE — 80048 BASIC METABOLIC PNL TOTAL CA: CPT | Performed by: INTERNAL MEDICINE

## 2021-01-04 PROCEDURE — 999N000208 ECHOCARDIOGRAM COMPLETE

## 2021-01-04 PROCEDURE — 36415 COLL VENOUS BLD VENIPUNCTURE: CPT | Performed by: INTERNAL MEDICINE

## 2021-01-04 PROCEDURE — 99207 PR APP CREDIT; MD BILLING SHARED VISIT: CPT | Performed by: PHYSICIAN ASSISTANT

## 2021-01-04 PROCEDURE — 999N000147 HC STATISTIC PT IP EVAL DEFER: Performed by: PHYSICAL THERAPIST

## 2021-01-04 PROCEDURE — 999N001017 HC STATISTIC GLUCOSE BY METER IP

## 2021-01-04 PROCEDURE — 84484 ASSAY OF TROPONIN QUANT: CPT | Performed by: INTERNAL MEDICINE

## 2021-01-04 PROCEDURE — 96372 THER/PROPH/DIAG INJ SC/IM: CPT | Performed by: INTERNAL MEDICINE

## 2021-01-04 RX ORDER — ISOSORBIDE MONONITRATE 30 MG/1
30 TABLET, EXTENDED RELEASE ORAL DAILY
Qty: 30 TABLET | Refills: 1 | Status: SHIPPED | OUTPATIENT
Start: 2021-01-04 | End: 2021-01-29

## 2021-01-04 RX ORDER — GABAPENTIN 300 MG/1
300 CAPSULE ORAL
Status: DISCONTINUED | OUTPATIENT
Start: 2021-01-04 | End: 2021-01-04 | Stop reason: HOSPADM

## 2021-01-04 RX ORDER — ISOSORBIDE MONONITRATE 30 MG/1
30 TABLET, EXTENDED RELEASE ORAL DAILY
Status: DISCONTINUED | OUTPATIENT
Start: 2021-01-05 | End: 2021-01-04 | Stop reason: HOSPADM

## 2021-01-04 RX ORDER — FUROSEMIDE 40 MG
20 TABLET ORAL DAILY
Qty: 90 TABLET | Refills: 0
Start: 2021-01-04 | End: 2021-02-09

## 2021-01-04 RX ORDER — CARVEDILOL 25 MG/1
25 TABLET ORAL 2 TIMES DAILY WITH MEALS
Status: DISCONTINUED | OUTPATIENT
Start: 2021-01-04 | End: 2021-01-04 | Stop reason: HOSPADM

## 2021-01-04 RX ADMIN — PANTOPRAZOLE SODIUM 40 MG: 40 TABLET, DELAYED RELEASE ORAL at 09:39

## 2021-01-04 RX ADMIN — APIXABAN 5 MG: 5 TABLET, FILM COATED ORAL at 09:39

## 2021-01-04 RX ADMIN — INSULIN ASPART 10 UNITS: 100 INJECTION, SOLUTION INTRAVENOUS; SUBCUTANEOUS at 12:48

## 2021-01-04 RX ADMIN — INSULIN ASPART 10 UNITS: 100 INJECTION, SOLUTION INTRAVENOUS; SUBCUTANEOUS at 09:42

## 2021-01-04 RX ADMIN — HUMAN ALBUMIN MICROSPHERES AND PERFLUTREN 2 ML: 10; .22 INJECTION, SOLUTION INTRAVENOUS at 08:47

## 2021-01-04 RX ADMIN — INSULIN GLARGINE 45 UNITS: 100 INJECTION, SOLUTION SUBCUTANEOUS at 09:38

## 2021-01-04 RX ADMIN — SODIUM CHLORIDE, POTASSIUM CHLORIDE, SODIUM LACTATE AND CALCIUM CHLORIDE 500 ML: 600; 310; 30; 20 INJECTION, SOLUTION INTRAVENOUS at 11:03

## 2021-01-04 RX ADMIN — CLOPIDOGREL BISULFATE 75 MG: 75 TABLET ORAL at 09:39

## 2021-01-04 NOTE — PHARMACY-ADMISSION MEDICATION HISTORY
Admission medication history interview status for this patient is complete. See [facility] admission navigator for allergy information, prior to admission medications and immunization status.     Medication history interview done via telephone during Covid-19 pandemic, indicate source(s): Patient  Medication history resources (including written lists, pill bottles, clinic record):None    Changes made to PTA medication list:  Added: none  Deleted: none  Changed:   - re-entered insulin aspart as separate orders  - insulin glargine 48 units -> 43-45 units qam  - gabapentin 600-900 mg to 300 mg at bedtime scheduled + 300 mg at bedtime as needed    Actions taken by pharmacist (provider contacted, etc):None     Additional medication history information:None    Medication reconciliation/reorder completed by provider prior to medication history?  N   (Y/N)     For patients on insulin therapy:   Do you use sliding scale insulin based on blood sugars? yes  What is your pre-meal insulin coverage?  10 units with breakfast and lunch and 12 units with dinner  Do you typically eat three meals a day? Most of the time  How many times do you check your blood glucose per day? 1-2  How many episodes of hypoglycemia do you typically have per month? none  Do you have a Continuous Glucose Monitor (CGM)?  No    Prior to Admission medications    Medication Sig Last Dose Taking? Auth Provider   apixaban ANTICOAGULANT (ELIQUIS) 5 MG tablet Take 1 tablet (5 mg) by mouth 2 times daily 1/3/2021 at am Yes Gill Doty PA   atorvastatin (LIPITOR) 40 MG tablet Take 1 tablet (40 mg) by mouth every evening 1/2/2021 Yes Gill Doty PA   carvedilol (COREG) 25 MG tablet Take 1 tablet (25 mg) by mouth 2 times daily (with meals) 1/3/2021 at am Yes Justin Tomlin MD   clopidogrel (PLAVIX) 75 MG tablet Take 1 tablet (75 mg) by mouth daily 1/3/2021 Yes Justin Tomlin MD   furosemide (LASIX) 40 MG tablet Take 1 tablet (40 mg) by mouth daily  1/3/2021 Yes Justin Tomlin MD   gabapentin (NEURONTIN) 300 MG capsule Take 300 mg by mouth At Bedtime 1/2/2021 Yes Unknown, Entered By History   gabapentin (NEURONTIN) 300 MG capsule Take 300 mg by mouth nightly as needed In addition to scheduled dose.  Yes Unknown, Entered By History   insulin aspart (NOVOLOG FLEXPEN) 100 UNIT/ML pen Inject Subcutaneous 3 times daily (with meals) Sliding Scale  Do not give sliding scale insulin if Pre-Meal BG less than 140.   For Pre-Meal:   - 200 give 2 units.    - 250 give 4 units.    - 300 give 6 units.    - 350 give 8 units.    - 400 give 10 units.  Yes Unknown, Entered By History   insulin aspart (NOVOLOG PEN) 100 UNIT/ML pen Inject 10 Units Subcutaneous 2 times daily (with meals) With breakfast and lunch 1/3/2021 at am Yes Unknown, Entered By History   insulin aspart (NOVOLOG PEN) 100 UNIT/ML pen Inject 12 Units Subcutaneous daily (with dinner) 1/2/2021 Yes Unknown, Entered By History   insulin glargine (LANTUS PEN) 100 UNIT/ML pen Inject 43-45 Units Subcutaneous every morning 1/3/2021 at 45 units Yes Unknown, Entered By History   isosorbide mononitrate (IMDUR) 60 MG 24 hr tablet Take 1 tablet (60 mg) by mouth daily 1/3/2021 Yes Justin Tomlin MD   levothyroxine (SYNTHROID, LEVOTHROID) 137 MCG tablet Take 137 mcg by mouth every evening  1/3/2021 Yes Henrry Figueroa PA-C   pantoprazole (PROTONIX) 40 MG EC tablet Take 1 tablet (40 mg) by mouth every morning (before breakfast) 1/3/2021 Yes Davion Cordova PA-C   spironolactone (ALDACTONE) 25 MG tablet Take 1 tablet (25 mg) by mouth daily 1/3/2021 Yes Justin Tomlin MD   Cadexomer Iodine, topical, 0.9% (IODOSORB) 0.9 % GEL gel Apply to ulcer on left foot every other day with gauze dressing. no recent use  Carlee Vega MD   insulin pen needle (31G X 6 MM) 31G X 6 MM miscellaneous Use  as directed.   Sarah Bolivar MD   nitroglycerin (NITROSTAT) 0.4  MG sublingual tablet Place 1 tablet (0.4 mg) under the tongue every 5 minutes as needed for chest pain   Davion Cordova PA-C   order for DME Equipment being ordered: size 12 surgical shoe   Ryan Bhagat DPM   order for DME Equipment being ordered: Walker Wheels () and Walker ()  Treatment Diagnosis: Impaired gait, heel WB bilaterally.   Herb Zurita III, MD

## 2021-01-04 NOTE — PLAN OF CARE
PRIMARY DIAGNOSIS: Dizziness, CP, Weakness    OUTPATIENT/OBSERVATION GOALS TO BE MET BEFORE DISCHARGE  1. Orthostatic performed: Yes -         Lying Orthostatic BP: 116/75         Sitting Orthostatic BP: 110/78 (reported moderate dizziness)        Standing Orthostatic BP: 109/66 (reported moderate dizziness)    2. Tolerating PO medications: Yes    3. Return to near baseline physical activity: SBA/Ax1 d/t dizziness    4. Cleared for discharge by consultants (if involved): No - cardiology to see    Discharge Planner Nurse   Safe discharge environment identified: Yes  Barriers to discharge: Yes; echo, cardiology consult, improvement of symptoms       Entered by: Batsheva Cornell 01/04/2021        VSS - carvedilol and imdur held per provider for /66. Reports dizziness with sitting and standing. Patient states this has been going on for months, worsening in past few days. Up with SBA/Ax1 d/t dizziness. Patient reports 1-2/10 chest pressure that radiates from shoulder to shoulder; chronic per patient. Provider updated & aware. Trace edema in bilateral ankles. SL. Echo completed, results pending. Cardiology to see.     Please review provider order for any additional goals.   Nurse to notify provider when observation goals have been met and patient is ready for discharge.

## 2021-01-04 NOTE — ED TRIAGE NOTES
Presents with fatigue, dizziness, nausea and reported tachycardia at home. VSS on RA. A&O x 4. Hx Afib, and 2 strokes.

## 2021-01-04 NOTE — CONSULTS
Consult Date:  01/04/2021      CARDIOLOGY CONSULTATION      CONSULT INDICATION:  Orthostatic hypotension.      HISTORY OF PRESENT ILLNESS:  I had the opportunity to see patient, Jayro Elder, today at [facility] for a Cardiology consultation.      As you know, he is a 70-year-old male with a past medical history significant for diabetes, hypothyroidism, hypertension, CVA, untreated sleep apnea, CKD, permanent atrial fibrillation, coronary artery disease, status post PCI with resultant ischemic cardiomyopathy, osteomyelitis, status post lower extremity digit amputation, who was admitted on 01/03/2001 for dizziness and fatigue.      The patient previously had been followed by my colleagues, Dr. Tomlin and Gill Doty PA-C.  He was last seen in clinic with CONCEPCION Dasilva, on 11/30/2020.  At that time, he had been doing reasonably well, though continued to have intermittent chest discomfort that was thought to be atypical for angina.  It was also mentioned that he has had issues with orthostatic hypotension previously, however, at that time and had been doing reasonably well.  Regarding his prior cardiac history, he has an extensive history of ischemic heart disease.  Prior anterior MI in 2005 with proximal LAD stenting, repeat LAD stenting in 2009, distal LAD stenting in 2013.  Presented again on 07/2019 with worsening dyspnea on exertion, found to have obstructive disease in the RCA for which he underwent stenting.      Has a history of atypical chest pain, evaluated with serial stress tests.  Last ischemic evaluation was a 04/20/2020 with a Lexiscan stress test that showed evidence of infarction in the mid distal LAD territory, no reversible ischemia present.      He presented to the Emergency Department with significant weakness, fatigue.  He also endorses continued chest pain, which has been present for over a year, described as a discomfort over his chest with radiation to his neck.  It does  not seem to be exertional in nature.  In the Emergency Department, he was found to have abnormal orthostatic vital signs, blood pressure supine was 128/80 mmHg, and standing it was 87/72 mmHg.      Since admission, he has been treated with IV fluids and notes mild improvement in his overall symptoms.  He still continues to have chest discomfort; however, it is largely unchanged over the last year.  Prior to admission, he notes no significant change in his weight, does not feel that he is in decompensated heart failure.      ASSESSMENT, PLAN AND RECOMMENDATIONS:     1.  Orthostatic hypotension.  Orthostatic vital signs were abnormal in the Emergency Department, there has been interval improvement with IV fluid administration since admission.  However, patient still notes dizziness/lightheadedness in the absence of blood pressure changes that meet criteria for orthostatic hypotension.  Suspect that there may be some sensitivity issues, which may be related to autonomic dysfunction in the setting of longstanding diabetes.   2.  Chest pain.  It has been present for over a year.  Troponin negative x3  since admission.  TTE 01/04/2001 shows LVEF 30%-35%, no significant change compared to prior.   3.  Permanent atrial fibrillation, on anticoagulation.   4.  Heart failure with reduced ejection fraction secondary to ischemic cardiomyopathy.   5.  Coronary artery disease, extensive history of coronary artery disease with MI in 2005 with proximal LAD stents, repeat LAD stenting in 2009 and in 2013. Stent to the OM in 2017.  Stents to RCA in 2019.  Last ischemic evaluation, Lexiscan stress test 04/2020 for atypical chest pain showed evidence of LAD territory infarction, no reversible ischemia.   6.  Sleep apnea, nonadherent with CPAP therapy.   7.  Former smoking.   8.  Diabetes.     -- Unfortunately, his overall clinical presentation is complicated by competing interests of heart failure/ischemic cardiomyopathy, coronary  artery disease, as well as symptomatic orthostatic hypotension.   -- Discussed at length that, unfortunately, there is no ideal management strategy, due to his significant comorbidities.   -- Reasonable to continue to hold spironolactone for now.   -- Decrease furosemide 40 mg daily to 20 mg daily.   -- Decrease Imdur 60 mg daily to 30 mg daily.  The patient has been experiencing significant headaches.   -- Continue remainder of cardiac regimen including carvedilol, apixaban, atorvastatin.   -- Hopefully, with these changes to his cardiac regimen, he will not experience any symptoms concerning for decompensated heart failure, or angina; however, with his severe symptomatic orthostatic hypotension, clearly his medications need to be adjusted.   -- Follow up with Cardiology FLORA in about 2 weeks (ordered).      Thank you for allowing our team to participate in the care of patient, Porter Yancey.  Please do not hesitate to page or call with any questions or concerns.      Crow Diaz MD, [facility]  Cardiology  Text Page   2021           D: 2021   T: 2021   MT: LALO      Name:     PORTER YANCEY   MRN:      [id number]        Account:       [id number]   :      1950           Consult Date:  2021      Document: [id number]

## 2021-01-04 NOTE — CONSULTS
Cardiology Consult Note-- PLEASE SEE ASSOCIATED DICTATION    Jayro Elder MRN#: [id number]   YOB: 1950 Age: 70 year old     Date of Admission: 1/3/2021  Consult indication: orthostatic hypotension         HPI and Assessment and Recommendations/Plan:      Please refer to the associated dictation: #[id number]     - overall clinical presentation seems most consistent with orthostatic hypotension (in ED supine 128/88, standing 87/72), however even after IVF administration and improvement in BPs, Pt still experiences dizziness/lightheadedness without meeting diagnostic criteria- BP supine 116/75, sitting 110/78 (moderate dizziness), standing 109/66 (moderate dizziness)  - decrease furosemide 40mg daily to 20mg daily  - agree with holding spironolactone for now  - continue carvedilol, apixaban, atorvastatin  - will decrease imdur 60mg daily to 30mg daily due to significant headache which is likely related to this long acting nitrate   - follow up with cardiology FLORA in about 2 weeks (ordered in discharge navigator)    Thank you for allowing our team to participate in the care of Jayro Elder.  Please do not hesitate to page me with any questions or concerns.     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     Cardiomyopathy (H)      Cellulitis of left lower extremity 3/13/2018     Cerebral infarction (H)     eye sight decreased in peripheral of right side and blurry     Diabetic ulcer of left midfoot associated with type 2 diabetes mellitus, with fat layer exposed (H) 3/13/2018     Essential hypertension, benign 11/13/2002     Generalized osteoarthrosis, unspecified site 11/13/2002     Microalbuminuria  3/13/2018    X1     Myocardial infarction (H)      Neuropathy      Sepsis (H)      Sleep apnea     doesn't use it     Substance abuse (H)      Syncope, unspecified syncope type 3/13/2018     Thyroid disease     takes medicine     Tobacco use disorder 11/13/2002     Type 2 diabetes mellitus with diabetic peripheral angiopathy without gangrene, unspecified long term insulin use status 2005               Past Surgical History:   I have reviewed this patient's past surgical history   Past Surgical History:   Procedure Laterality Date     AMPUTATE TOE(S) Right 1/6/2019    Procedure: Right open second toe partial amputation;  Surgeon: Saibno Garcia DPM;  Location: [facility] OR     AMPUTATE TOE(S) Right 1/8/2019    Procedure: 1.  Revision right second toe amputation with resection of distal half of proximal phalanx with full closure.    2.  Debridement of callus/preulcerative lesion, distal left second toe and plantar left first metatarsophalangeal joint.;  Surgeon: Ryan Bhagat DPM;  Location: [facility] OR     AMPUTATE TOE(S) Right 3/12/2019    Procedure: Partial right fourth toe amputation.;  Surgeon: Ryan Bhagat DPM;  Location: [facility] OR     AMPUTATE TOE(S) Right 5/6/2019    Procedure: Right partial third toe amputation;  Surgeon: Татьяна Mckeon DPM, Podiatry/Foot and Ankle Surgery;  Location: [facility] OR     AMPUTATE TOE(S) Left 4/18/2020    Procedure: LEFT SECOND TOE AMPUTATION;  Surgeon: Ryan Bhagat DPM;  Location: [facility] OR     ANGIOGRAM  6/29/05    subtotal occ.mid LAD,SUSAN mid LAD     ANGIOGRAM  7/05    mild CAD,patent stent,no flow-limiting lesions,sev.LV dysf.LAD enlarged     ANGIOGRAM  2/09    Sev.single vessel disease,Mod LV dysf.distal LAD 90%,70-75% mid lad just before prev stent,SUSAN to prox.mid LAD, endeavor to distal LAD     ANGIOGRAM  11/13/13    restenosis, stent LAD     BACK SURGERY      back surgery x3     CV CORONARY ANGIOGRAM N/A 7/8/2019    Procedure: Coronary Angiogram;  Surgeon: Keira  Jose Alfredo HICKS MD;  Location:  HEART CARDIAC CATH LAB     CV LEFT HEART CATH N/A 7/8/2019    Procedure: Left Heart Cath;  Surgeon: Jose Alfredo Crockett MD;  Location:  HEART CARDIAC CATH LAB     CV PCI ASPIRATION THROMECTOMY N/A 7/8/2019    Procedure: PCI Aspiration Thrombectomy;  Surgeon: Jose Alfredo Crockett MD;  Location:  HEART CARDIAC CATH LAB     CV PCI STENT DRUG ELUTING N/A 7/8/2019    Procedure: PCI Stent Drug Eluting;  Surgeon: Jose Alfredo Crockett MD;  Location:  HEART CARDIAC CATH LAB     HEART CATH LEFT HEART CATH  06/13/2017    SUSAN to OM3     INCISION AND DRAINAGE TOE, COMBINED Bilateral 9/11/2018    Procedure: COMBINED INCISION AND DRAINAGE TOE;  1) Irrigation and debridement ulcer left great toe  2) Amputation 3rd toe right foot at distal interphalangeal joint level;  Surgeon: Miki Harp MD;  Location: [facility] OR     IRRIGATION AND DEBRIDEMENT FOOT, COMBINED Left 3/12/2019    Procedure: Debridement of left foot ulcer sub first MPJ 2 x 2.5 cm down to and including subcutaneous tissue, callus debridement for preulcerative lesion, left second toe.;  Surgeon: Ryan Bhagat DPM;  Location:  OR     ORTHOPEDIC SURGERY      bunion surgery both feet     ORTHOPEDIC SURGERY      left shoulder     SOFT TISSUE SURGERY      debridement of toe numerous time     VASCULAR SURGERY      7 stents in heart     [facility] NONSPECIFIC PROCEDURE      Laminectomy x 3 - (1983 x 2 & 1990)     [facility] NONSPECIFIC PROCEDURE Bilateral 1998    Bunionectomy     [facility] NONSPECIFIC PROCEDURE  1959    Gingival surgery at age 9             Social History:   I have reviewed this patient's social history  Social History     Tobacco Use     Smoking status: Former Smoker     Packs/day: 1.00     Years: 25.00     Pack years: 25.00     Types: Cigarettes     Start date: 1980     Quit date: 7/25/2005     Years since quitting: 15.4     Smokeless tobacco: Never Used   Substance Use Topics     Alcohol use: Yes     Alcohol/week: 4.0 standard drinks      Types: 4 Shots of liquor per week     Frequency: Never     Comment: OCCASSIONALLY              Family History:   I have reviewed this patient's family history  Family History   Problem Relation Age of Onset     Cancer - colorectal Sister      Thyroid Disease Brother      Cardiovascular Brother      Diabetes Brother      Cancer Father         tumor in chest and throat     Arthritis Mother      Thyroid Disease Mother      Diabetes Mother      Glaucoma No family hx of      Macular Degeneration No family hx of              Allergies:   I have reviewed this patient's allergy history  Allergies   Allergen Reactions     No Known Allergies              Medications reviewed:   Prior to admission medications:  Prior to Admission medications    Medication Sig Start Date End Date Taking? Authorizing Provider   apixaban ANTICOAGULANT (ELIQUIS) 5 MG tablet Take 1 tablet (5 mg) by mouth 2 times daily 3/7/19  Yes Gill Doty PA   atorvastatin (LIPITOR) 40 MG tablet Take 1 tablet (40 mg) by mouth every evening 6/16/20  Yes Gill Doty PA   carvedilol (COREG) 25 MG tablet Take 1 tablet (25 mg) by mouth 2 times daily (with meals) 10/15/20  Yes Justin Tomlin MD   clopidogrel (PLAVIX) 75 MG tablet Take 1 tablet (75 mg) by mouth daily 10/26/20  Yes Justin Tomlin MD   furosemide (LASIX) 40 MG tablet Take 1 tablet (40 mg) by mouth daily 11/3/20  Yes Justin Tomlin MD   gabapentin (NEURONTIN) 300 MG capsule Take 300 mg by mouth At Bedtime   Yes Unknown, Entered By History   gabapentin (NEURONTIN) 300 MG capsule Take 300 mg by mouth nightly as needed In addition to scheduled dose.   Yes Unknown, Entered By History   insulin aspart (NOVOLOG FLEXPEN) 100 UNIT/ML pen Inject Subcutaneous 3 times daily (with meals) Sliding Scale  Do not give sliding scale insulin if Pre-Meal BG less than 140.   For Pre-Meal:   - 200 give 2 units.    - 250 give 4 units.    - 300 give 6 units.    - 350  give 8 units.    - 400 give 10 units.   Yes Unknown, Entered By History   insulin aspart (NOVOLOG PEN) 100 UNIT/ML pen Inject 10 Units Subcutaneous 2 times daily (with meals) With breakfast and lunch   Yes Unknown, Entered By History   insulin aspart (NOVOLOG PEN) 100 UNIT/ML pen Inject 12 Units Subcutaneous daily (with dinner)   Yes Unknown, Entered By History   insulin glargine (LANTUS PEN) 100 UNIT/ML pen Inject 43-45 Units Subcutaneous every morning   Yes Unknown, Entered By History   isosorbide mononitrate (IMDUR) 60 MG 24 hr tablet Take 1 tablet (60 mg) by mouth daily 9/8/20  Yes Justin Tomlin MD   levothyroxine (SYNTHROID, LEVOTHROID) 137 MCG tablet Take 137 mcg by mouth every evening  1/31/12  Yes Henrry Figueroa PA-C   pantoprazole (PROTONIX) 40 MG EC tablet Take 1 tablet (40 mg) by mouth every morning (before breakfast) 8/24/20  Yes Davion Cordova PA-C   spironolactone (ALDACTONE) 25 MG tablet Take 1 tablet (25 mg) by mouth daily 11/19/20  Yes Justin Tomlin MD   Cadexomer Iodine, topical, 0.9% (IODOSORB) 0.9 % GEL gel Apply to ulcer on left foot every other day with gauze dressing. 7/17/20   Carlee Vega MD   insulin pen needle (31G X 6 MM) 31G X 6 MM miscellaneous Use  as directed. 9/21/20   Sarah Bolivar MD   nitroglycerin (NITROSTAT) 0.4 MG sublingual tablet Place 1 tablet (0.4 mg) under the tongue every 5 minutes as needed for chest pain 5/3/17   Davion Cordova PA-C   order for DME Equipment being ordered: size 12 surgical shoe 4/16/20   Ryan Bhagat DPM   order for DME Equipment being ordered: Walker Wheels () and Walker ()  Treatment Diagnosis: Impaired gait, heel WB bilaterally. 9/12/18   Herb Zurita III, MD      Current medications:  Current Facility-Administered Medications Ordered in Epic   Medication Dose Route Frequency Last Rate Last Admin     acetaminophen (TYLENOL) Suppository 650 mg  650 mg Rectal  Q4H PRN         acetaminophen (TYLENOL) tablet 650 mg  650 mg Oral Q4H PRN         apixaban ANTICOAGULANT (ELIQUIS) tablet 5 mg  5 mg Oral BID   5 mg at 01/04/21 0939     atorvastatin (LIPITOR) tablet 40 mg  40 mg Oral QPM   40 mg at 01/03/21 2212     carvedilol (COREG) tablet 25 mg  25 mg Oral BID w/meals         clopidogrel (PLAVIX) tablet 75 mg  75 mg Oral Daily   75 mg at 01/04/21 0939     glucose gel 15-30 g  15-30 g Oral Q15 Min PRN        Or     dextrose 50 % injection 25-50 mL  25-50 mL Intravenous Q15 Min PRN        Or     glucagon injection 1 mg  1 mg Subcutaneous Q15 Min PRN         glucose gel 15-30 g  15-30 g Oral Q15 Min PRN        Or     dextrose 50 % injection 25-50 mL  25-50 mL Intravenous Q15 Min PRN        Or     glucagon injection 1 mg  1 mg Subcutaneous Q15 Min PRN         gabapentin (NEURONTIN) capsule 300 mg  300 mg Oral At Bedtime PRN         gabapentin (NEURONTIN) capsule 300 mg  300 mg Oral At Bedtime   300 mg at 01/03/21 2212     insulin aspart (NovoLOG) injection (RAPID ACTING)  10 Units Subcutaneous BID w/meals   10 Units at 01/04/21 1248     insulin aspart (NovoLOG) injection (RAPID ACTING)  12 Units Subcutaneous Daily with supper   12 Units at 01/03/21 2308     insulin aspart (NovoLOG) injection (RAPID ACTING)  1-7 Units Subcutaneous TID AC   3 Units at 01/04/21 1248     insulin aspart (NovoLOG) injection (RAPID ACTING)  1-5 Units Subcutaneous At Bedtime   3 Units at 01/03/21 2309     insulin glargine (LANTUS) injection 45 Units  45 Units Subcutaneous QAM   45 Units at 01/04/21 0938     isosorbide mononitrate (IMDUR) 24 hr tablet 60 mg  60 mg Oral Daily         levothyroxine (SYNTHROID/LEVOTHROID) tablet 137 mcg  137 mcg Oral QPM   137 mcg at 01/03/21 2212     melatonin tablet 1 mg  1 mg Oral At Bedtime PRN         nitroGLYcerin (NITROSTAT) sublingual tablet 0.4 mg  0.4 mg Sublingual Q5 Min PRN         ondansetron (ZOFRAN-ODT) ODT tab 4 mg  4 mg Oral Q6H PRN        Or     ondansetron  (ZOFRAN) injection 4 mg  4 mg Intravenous Q6H PRN         pantoprazole (PROTONIX) EC tablet 40 mg  40 mg Oral QAM AC   40 mg at 21 0939     polyethylene glycol (MIRALAX) Packet 17 g  17 g Oral Daily         senna-docusate (SENOKOT-S/PERICOLACE) 8.6-50 MG per tablet 1 tablet  1 tablet Oral BID        Or     senna-docusate (SENOKOT-S/PERICOLACE) 8.6-50 MG per tablet 2 tablet  2 tablet Oral BID         No current [facility]-ordered outpatient medications on file.             Review of Systems:   A complete review of systems was performed and was negative except as mentioned in the HPI.          Physical Exam:   Vital signs were personally reviewed:  Temperatures:  Current - Temp: 96.3  F (35.7  C); Max - Temp  Av.5  F (35.8  C)  Min: 96.2  F (35.7  C)  Max: 97  F (36.1  C)  Respiration range: Resp  Av.8  Min: 9  Max: 18  Pulse range: Pulse  Av.2  Min: 70  Max: 103  Blood pressure range: Systolic (24hrs), Av , Min:108 , Max:140   ; Diastolic (24hrs), Av, Min:72, Max:105    Pulse oximetry range: SpO2  Av.8 %  Min: 96 %  Max: 100 %  No intake or output data in the 24 hours ending 21 1324  247 lbs 11.2 oz  Body mass index is 30.15 kg/m .   Body surface area is 2.46 meters squared.    Constitutional: appears stated age, in no apparent distress, appears to be well nourished  Eyes: sclera anicteric, conjunctiva normal  ENT: normocephalic, without obvious abnormality, atraumatic  Pulmonary: clear to auscultation bilaterally  Cardiovascular: JVP normal, regular rate, irregularly irregular rhythm, 1/6 MARIAM at the RUSB, trace BLE edema   Gastrointestinal: abdominal exam benign  Neurologic: awake, alert, face symmetrical, moves all extremities  Skin: no jaundice  Psychiatric: affect is normal, answers questions appropriately, oriented to self and place         Laboratory tests:   Laboratory tests personally reviewed:   [facility]  Recent Labs   Lab 21  0616 21  1823    128*   POTASSIUM  3.6 4.5   CHLORIDE 102 95   CO2 30 27   ANIONGAP 2* 6   * 473*   BUN 25 32*   CR 1.06 1.30*   GFRESTIMATED 71 55*   GFRESTBLACK 82 64   BETTIE 8.9 9.1     CBC  Recent Labs   Lab 21  0616 21  1823   WBC 6.1 8.3   RBC 4.62 4.95   HGB 13.9 15.0   HCT 40.7 43.2   MCV 88 87   MCH 30.1 30.3   MCHC 34.2 34.7   RDW 12.1 12.1    242     INR  Recent Labs   Lab 21  1823   INR 1.19*     Lab Results   Component Value Date    TROPI <0.015 2021    TROPI <0.015 2021    TROPI <0.015 2021    TROPONIN <0.07 2005    TROPONIN <0.07 2005    TROPONIN <0.07 2005     Recent Labs   Lab Test 20  1028 19  0643 02/02/15  1016 02/02/15  1016 13  0710   CHOL 107 106   < > 99 103   HDL 45 46   < > 43 30*   LDL 53 43   < > 42 57   TRIG 44 84   < > 68 82   CHOLHDLRATIO  --   --   --  2.3 3.5    < > = values in this interval not displayed.     Lab Results   Component Value Date    A1C 9.8 09/15/2020    A1C 10.4 2020    A1C 8.8 2019    A1C 9.0 2019    A1C 11.7 2019     TSH   Date Value Ref Range Status   10/09/2019 1.80 0.40 - 4.00 mU/L Final            Imaging and Additional Data:   Additional data personally reviewed:  Recent Results (from the past 4320 hour(s))   Echocardiogram Complete    Narrative    [id number]  [id number]  [id number]  [id number]^NANCYA^MORGAN^[facility]  Echocardiography Laboratory  [address] [place] [address]        Name: PORTER YANCEY  MRN: [id number]  : 1950  Study Date: 2021 07:26 AM  Age: 70 yrs  Gender: Male  Patient Location: [facility]  Reason For Study: Dizziness  Ordering Physician: MORGAN MRAIE  Referring Physician: Davion Cordova  Performed By: Katelin Deleon RDCS     BSA: 2.5 m2  Height: 76 in  Weight: 257 lb  HR: 86  BP: 138/72 mmHg  _____________________________________________________________________________  __        Procedure  Complete Portable Echo  Adult. Optison (NDC #2351-2036) given intravenously.  _____________________________________________________________________________  __        Interpretation Summary     The visual ejection fraction is estimated at 30-35%.  Left ventricular systolic function is moderately reduced.  There are regional wall motion abnormalities as specified.  The study was technically difficult. Compared to prior study, there is no  significant change.  _____________________________________________________________________________  __        Left Ventricle  The left ventricle is normal in size. There is normal left ventricular wall  thickness. Diastolic Doppler findings (E/E' ratio and/or other parameters)  suggest left ventricular filling pressures are increased. The visual ejection  fraction is estimated at 30-35%. Left ventricular systolic function is  moderately reduced. Anteroseptal akinesis. Only the basal anterospetum  contracts normally. The distal inferoseptum is akinetic. distal inferior wall  akinesis. Distal anterior akinesis. Distal inferolateral akinesis. There is  apical akinesis. Septal motion is consistent with conduction abnormality.  There are regional wall motion abnormalities as specified.     Right Ventricle  The right ventricle is normal size. Mildly decreased right ventricular  systolic function.     Atria  The left atrium is mildly dilated. The right atrium is mildly dilated. There  is no color Doppler evidence of an atrial shunt.     Mitral Valve  There is trace mitral regurgitation.        Tricuspid Valve  There is trace tricuspid regurgitation. The right ventricular systolic  pressure is approximated at 16.7 mmHg plus the right atrial pressure.     Aortic Valve  There is trace aortic regurgitation. No hemodynamically significant valvular  aortic stenosis.     Pulmonic Valve  There is trace pulmonic valvular regurgitation.     Vessels  The aortic root is normal size. IVC diameter <2.1 cm collapsing >50%  with  sniff suggests a normal RA pressure of 3 mmHg.     Pericardium  There is no pericardial effusion.        Rhythm  The rhythm was atrial fibrillation.  _____________________________________________________________________________  __  MMode/2D Measurements & Calculations  IVSd: 1.1 cm     LVIDd: 4.9 cm  LVIDs: 3.5 cm  LVPWd: 1.1 cm  FS: 28.5 %  LV mass(C)d: 196.9 grams  LV mass(C)dI: 79.9 grams/m2  Ao root diam: 3.3 cm  LA Volume (BP): 83.6 ml  LA Volume Index (BP): 34.0 ml/m2  RWT: 0.44        Doppler Measurements & Calculations  MV E max john: 92.8 cm/sec     MV max P.7 mmHg  MV mean P.2 mmHg  MV V2 VTI: 22.2 cm  MV dec time: 0.22 sec  AI P1/2t: 594.7 msec  TR max john: 204.1 cm/sec  TR max P.7 mmHg  E/E' av.8  Lateral E/e': 12.4  Medial E/e': 15.3           _____________________________________________________________________________  __           Report approved by: Hank Varghese 2021 10:50 AM

## 2021-01-04 NOTE — PLAN OF CARE
PRIMARY DIAGNOSIS: Dizziness, CP, Weakness    OUTPATIENT/OBSERVATION GOALS TO BE MET BEFORE DISCHARGE  1. Orthostatic performed: Yes -         Lying Orthostatic BP: 116/75         Sitting Orthostatic BP: 110/78 (reported moderate dizziness)        Standing Orthostatic BP: 109/66 (reported moderate dizziness)    2. Tolerating PO medications: Yes    3. Return to near baseline physical activity: SBA/Ax1 d/t dizziness    4. Cleared for discharge by consultants (if involved): No - cardiology to see    Discharge Planner Nurse   Safe discharge environment identified: Yes  Barriers to discharge: Yes; echo, cardiology consult, improvement of symptoms       Entered by: Batsheva Cornell 01/04/2021        VSS - carvedilol and imdur held per provider for /66. Reports dizziness with sitting and standing. Patient states this has been going on for months, worsening in past few days. Up with SBA d/t dizziness - 'furniture surfs'. Patient reports 1-2/10 chest pressure that radiates from shoulder to shoulder; chronic per patient. Provider updated & aware. Trace edema in bilateral ankles. SL. Echo completed, results pending. Cardiology to see. Bolus of LR infusing.     Please review provider order for any additional goals.   Nurse to notify provider when observation goals have been met and patient is ready for discharge.

## 2021-01-04 NOTE — PLAN OF CARE
PRIMARY DIAGNOSIS: VERTIGO    OUTPATIENT/OBSERVATION GOALS TO BE MET BEFORE DISCHARGE  1. Orthostatic performed: No    2. Completion of appropriate imaging: Yes    3. Tolerating PO medications: Yes    4. Return to near baseline physical activity: Yes    5. Cleared for discharge by consultants (if involved): No    Pt is A&O x4. VSS. Reports slight shoulder pain, 2/10. SBA. Glucose monitoring and insulin given. Cardio and PT consult. Will provide supportive cares.       Discharge Planner Nurse   Safe discharge environment identified: Yes  Barriers to discharge: Yes       Entered by: Mariana Brooks 01/04/2021 1:24 AM     Please review provider order for any additional goals.   Nurse to notify provider when observation goals have been met and patient is ready for discharge.

## 2021-01-04 NOTE — PLAN OF CARE
ROOM # [id number]    Living Situation (if not independent, order SW consult): Home with wife, son, and grandchildren  Facility name:  : [facility]    Activity level at baseline: Ind  Activity level on admit: SBA      Patient registered to observation; given Patient Bill of Rights; given the opportunity to ask questions about observation status and their plan of care.  Patient has been oriented to the observation room, bathroom and call light is in place.    Discussed discharge goals and expectations with patient/family.

## 2021-01-04 NOTE — PLAN OF CARE
PT: Orders received. Per discussion with PA, pt does not have IP PT needs at this time as he is mobilizing with SBA and is only limited by non vertigo related dizziness. Will defer recommendations to PA, and cardiology. Please re-order if pt's needs change.

## 2021-01-04 NOTE — ED PROVIDER NOTES
History   Chief Complaint  Dizziness, Fatigue, and Nausea    HPI   Jayro Elder is a 70 year old male with a history of CAD, hypertension, hyperlipidemia, atrial fibrillation on Eliquis and Plavix, cardiomyopathy, MI, CVA, and type II diabetes on insulin, who presents for evaluation of lightheadedness that has been present for the last week now. The patient reports that this lightheadedness is significantly worse when standing up. He also reports some intermittent chest pain that happens several times a day. He is unsure if this is exertional in nature because he hasn't been very active recently secondary to his ongoing foot issues. Denies any fever or cough. He notes that he has been taking his medications regularly; however, he did forget to take his Lasix this morning. Denies any known COVID contacts.     Review of Systems   Constitutional: Negative for fever.   Respiratory: Negative for cough.    Cardiovascular: Positive for chest pain.   Neurological: Positive for light-headedness.   All other systems reviewed and are negative.    Allergies  The patient has no known allergies.     Medications  Eliquis  Lipitor  Coreg  Plavix  Lasix  Gabapentin  Insulin  Imdur  Synthroid  Nitrostat  Protonix  Aldactone    Past Medical History  Coronary artery disease   Cancer   Cardiomyopathy   Cellulitis  Cerebral Infarction   Hypertension   OA  MI   Neuropathy   Sepsis   Sleep apnea  Type 2 diabetes   Substance abuse   Hyperlipidemia  Afib    CVA    Past Surgical History  Amputate toes - right second toe partial   Amputate toes - revision second toe, debridement of callus   Amputate toes - partial right fourth toe  Amputate toe - right third toe amputation   Amputate toe left second toe   Angiogram x4  Back surgery x3  Laminectomy   Bunionectomy   Gingival surgery   Coronary angiogram   CV left heart cath   PCI aspiration thrombectomy   PCI Stent drug eluting  I & D toe  I & D foot   Left surgery ortho surgery   I & D  toes multiple   Vascular - 7 stents   Heart cath left     Family History  Sister: cancer colorectal   Brother: thyroid cancer, cardiovascular, diabetes   Father: cancer   Mother: arthritis, thyroid disease, diabetes      Social History  Presents to the ED alone.   Marital status:   Lives with his wife.     Physical Exam     Patient Vitals for the past 24 hrs:   BP Temp Temp src Pulse Resp SpO2   01/03/21 2015 (!) 136/105 -- -- 93 9 --   01/03/21 2000 (!) 123/99 -- -- 98 11 --   01/03/21 1945 113/83 -- -- 81 11 100 %   01/03/21 1930 (!) 120/97 -- -- 89 16 97 %   01/03/21 1915 128/88 -- -- 103 13 99 %   01/03/21 1900 (!) 119/97 -- -- 98 11 100 %   01/03/21 1845 132/87 -- -- 93 12 100 %   01/03/21 1830 (!) 140/100 -- -- 91 13 100 %   01/03/21 1811 128/82 97  F (36.1  C) Temporal 83 17 98 %     Orthostatics:   Lying Orthostatic BP: 128/88  Lying Orthostatic Pulse: 97 bpm    Sitting Orthostatic BP: 122/89  Sitting Orthostatic Pulse: 108 bpm    Standing Orthostatic BP: 87/72 (patient states feeling weak and needed to hold on to IV in order to stay standing)  Standing Orthostatic Pulse: 116 bpm    Physical Exam  General: Patient is alert and interactive when I enter the room  Head:  The scalp, face, and head appear normal  Eyes:  Conjunctivae are normal, no nystagmus   ENT:    The nose is normal    Pinnae are normal    External acoustic canals are normal  Neck:  Trachea midline  CV:  Pulses are normal. RRR.  Resp:  No respiratory distress. CTAB.    Abdomen:      Soft, non-tender, non-distended  Musc:  Normal muscular tone    No major joint effusions    No asymmetric leg swelling  Skin:  No rash or lesions noted  Neuro: Speech is normal and fluent. Face is symmetric.     Moving all extremities well. No limb ataxia. No finger to nose ataxia. No pronator drift. Good strength in extremities.   Psych: Awake. Alert.  Normal affect.  Appropriate interactions.     Emergency Department Course   EKG  Time: 1819  Rate 111 bpm.  OK interval *. QRS duration 140. QT/QTc 378/514. P-R-T axes * -48 -25.  Atrial fibrillation with rapid ventricular response. Left axis deviation. Right bundle branch block. Septal infarct, age undetermined. Abnormal ECG.   Read time: 1824  Read by Marybeth Clark MD    Imaging:  XR Chest Portable 1 View:   No pleural fluid or pneumothorax. No airspace disease or edema. The cardiomediastinal silhouette is at the upper limits of normal. Coronary artery disease is suspected. Old left AC joint separation noted. Old left rib fractures noted. as per radiology.     Laboratory:  CBC: WBC: 8.3, HGB: 15.0, PLT: 242    BMP: Glucose 473 (H), Na 128 (L), BUN 32 (H), Creatinine 1.30 (H), GFR estimate 55 (L), o/w WNL    INR: 1.19 (H)    1823 Troponin I: <0.015    D dimer: 0.3    BNP: 368    Asymptomatic SARS-CoV-2 COVID-19 Virus (Coronavirus) by PCR: pending    Emergency Department Course:  Reviewed:  I reviewed nursing notes, vitals and past medical history    Assessments:  1818 I physically examined the patient as documented above.     I rechecked the patient.     Consults:  2030 I consulted with Dr. Crespo, on call hospitalist, regarding the patient's history and presentation here in the emergency department.    Interventions:  1908 NS 0.5 L IV    Disposition:  The patient was admitted to the hospital under the care of Dr. Crespo.     Impression & Plan   Medical Decision Making:  Jayro Elder is a 70 year old male with a history of CAD, atrial fib and ischemic cardiomyopathy who presents with lightheadedness.  Patient symptoms do sound like orthostatic hypotension and we did do orthostatics here his blood pressure dropped to the 80s.  He certainly was symptomatic during this.  His EKG reveals A. fib with mild IV RVR.  Patient states he is always in A. fib.  Is on Eliquis and Plavix.  His blood work including BNP dimer and troponin here were unremarkable.  Chest x-ray revealed no acute abnormality.  Patient reports some  chest pain that has been ongoing but he is very vague about his chest pain and he did have a Lexiscan done in April which was unchanged from his prior.  With his persistent dizziness with significant orthostasis which reviewing his audiology notes this is happened in the past and has been an issue.  The held his cardiac meds however he started to get more leg swelling so they restarted his meds.  This could be related to his medications especially if this is happened in the past.  We did give him a bolus of fluids.  Patient is hyperglycemic as well but in no DKA.  Will be admitted to medicine observation telemetry for orthostatic hypotension.  Diagnosis:    ICD-10-CM    1. Dizziness  R42      Scribe Disclosure:  I, Antonio Rosas, am serving as a scribe at 6:17 PM on 1/3/2021 to document services personally performed by Marybeth Clark MD based on my observations and the provider's statements to me.      Marybeth Clark MD  01/03/21 2022

## 2021-01-04 NOTE — H&P
[facility]    History and Physical  Hospitalist       Date of Admission:  1/3/2021  Date of Service (when I saw the patient): 01/03/21    Assessment & Plan   Jayro Elder is a 70 year old male patient with past medical history of CAD with PTCA and stent in mid LAD, cardiomyopathy, diabetes mellitus type 2, insulin requiring, essential hypertension, paroxysmal atrial fibrillation, CVA, sleep apnea, diabetic neuropathy, hypothyroidism, history of diabetic foot ulcer and status post right and left second toe amputation, GERD who came to emergency room for evaluation for dizziness and lightheadedness.  Patient stated that his been having generalized weakness associated with dizziness and lightheadedness which got worse in the last 3 days.  Patient was noted to be orthostatic in emergency room. Laboratory work-up showed sodium 128, potassium 4.5, creatinine 1.30, , troponin less than 0.015. Glucose 473.  CBC within normal limits. Chest x-ray showed no evidence of acute infiltrates or pulmonary edema.  In the ER, he was given normal saline 500 mL bolus.  He was admitted to the hospital for further evaluation and management.    1.  Lightheadedness, orthostatic hypotension  Suspect secondary to diuretics, dehydration. Patient stated that she feels lightheaded when he gets up.  He denies falls.  He is currently on Lasix and spironolactone.  He stated that he did not take his morning dose of Lasix. Will hold diuretics for tonight. He was given normal saline 500 mL bolus. Will likely need decreased dose of Lasix because of hypotension and recurrent lightheadedness.  -Last echo showed 35-40% with moderately reduced ventricular systolic function. Will check echocardiogram  -He stated that he has not seen cardiologist for more than a year. He wants to see cardiology during this admission. Will consult cards for evaluation medication dose adjustment.   -Will consult physical therapy    2. Chronic  kidney disease  Creatinine 1.30. Baseline creatinine 1.1-1.4.   Patient stated that he thinks he takes enough oral intake.  He has been having lightheadedness for a while and his symptoms got worse in the last 3 days. He has cardiomyopathy. He was given normal saline 500 ml. We will hold diuretic for now.  Encourage oral intake.      3. Mild hyponatremia suspect secondary to dehydration  -He was given IV fluid bolus in the ER. Will recheck serum sodium in am.     4.  Coronary artery disease, status post stent in the mid LAD  On Coreg, atorvastatin, Imdur, nitroglycerin.  States that he has intermittent chest pain.  His first set of troponin is negative.  EKG is also unremarkable for acute ischemia.  We will do 2 more sets of cardiac enzymes.  Will monitor on telemetry.    5.  Paroxysmal atrial fibrillation: We will resume Coreg, Eliquis    6.  Diabetic neuropathy: We will resume gabapentin.    7.  Diabetes mellitus type 2, insulin requiring: Blood sugar not well controlled.  We will resume Lantus insulin 45 units daily, insulin sliding scale, prandial insulin.  Will monitor blood sugar and adjust insulin dose as needed    8.  GERD: We will resume pantoprazole    9.  Hypothyroidism: We will resume Synthroid    We will admit patient to observation  DVT Prophylaxis: Pneumatic Compression Devices  Code Status: Full Code    Disposition: Expected discharge in 1-2 days    Betito Crespo MD    Primary Care Physician   Davion Cordova    Chief Complaint       History is obtained from the patient    History of Present Illness   Jayro Elder is a 70 year old male patient with past medical history of CAD with PTCA and stent in mid LAD, cardiomyopathy, diabetes mellitus type 2, insulin requiring, essential hypertension, CVA, sleep apnea, diabetic neuropathy, hypothyroidism, history of diabetic foot ulcer and status post right and left second toe amputation who came to emergency room for evaluation for dizziness and  lightheadedness.  Patient stated that his been having generalized weakness associated with dizziness and lightheadedness which got worse in the last 3 days.  He stated that his symptoms have been going on for a while but gradually got worse.  He is on Lasix and spironolactone.  He stated that he did not take his Lasix this morning because of worsening symptoms.  He has intermittent chest discomfort.  He denies shortness of breath, palpitations, leg swelling.  He has nausea but denies vomiting.  He has no fever, cough, abdominal pain, dysuria, urgency or frequency.  On arrival to emergency room, his vital signs were checked and showed temperature 97, pulse 93, blood pressure 132/87, oxygen saturation 90% on room air.  He was noted to be orthostatic.  Laboratory work-up showed sodium 128, potassium 4.5, creatinine 1.3, , troponin less than 1.015, glucose 423.  WBC 8.3, hemoglobin 15.0.  Chest x-ray showed no evidence of acute infiltrates or pulmonary edema.  In emergency room, he was given normal saline 500 mL x 1 dose.  He was admitted to the hospital for further management.      Past Medical History    I have reviewed this patient's medical history and updated it with pertinent information if needed.   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     Cardiomyopathy (H)      Cellulitis of left lower extremity 3/13/2018     Cerebral infarction (H)     eye sight decreased in peripheral of right side and blurry     Diabetic ulcer of left midfoot associated with type 2 diabetes mellitus, with fat layer exposed (H) 3/13/2018     Essential hypertension, benign 11/13/2002     Generalized osteoarthrosis, unspecified site 11/13/2002     Microalbuminuria 3/13/2018    X1     Myocardial infarction (H)      Neuropathy      Sepsis (H)      Sleep apnea     doesn't use it     Substance  abuse (H)      Syncope, unspecified syncope type 3/13/2018     Thyroid disease     takes medicine     Tobacco use disorder 11/13/2002     Type 2 diabetes mellitus with diabetic peripheral angiopathy without gangrene, unspecified long term insulin use status 2005       Past Surgical History   I have reviewed this patient's surgical history and updated it with pertinent information if needed.  Past Surgical History:   Procedure Laterality Date     AMPUTATE TOE(S) Right 1/6/2019    Procedure: Right open second toe partial amputation;  Surgeon: Sabino Garcia DPM;  Location: [facility] OR     AMPUTATE TOE(S) Right 1/8/2019    Procedure: 1.  Revision right second toe amputation with resection of distal half of proximal phalanx with full closure.    2.  Debridement of callus/preulcerative lesion, distal left second toe and plantar left first metatarsophalangeal joint.;  Surgeon: Ryan Bhagat DPM;  Location: [facility] OR     AMPUTATE TOE(S) Right 3/12/2019    Procedure: Partial right fourth toe amputation.;  Surgeon: Ryan Bhagat DPM;  Location: [facility] OR     AMPUTATE TOE(S) Right 5/6/2019    Procedure: Right partial third toe amputation;  Surgeon: Татьяна Mckeon DPM, Podiatry/Foot and Ankle Surgery;  Location: [facility] OR     AMPUTATE TOE(S) Left 4/18/2020    Procedure: LEFT SECOND TOE AMPUTATION;  Surgeon: Ryan Bhagat DPM;  Location: [facility] OR     ANGIOGRAM  6/29/05    subtotal occ.mid LAD,SUSAN mid LAD     ANGIOGRAM  7/05    mild CAD,patent stent,no flow-limiting lesions,sev.LV dysf.LAD enlarged     ANGIOGRAM  2/09    Sev.single vessel disease,Mod LV dysf.distal LAD 90%,70-75% mid lad just before prev stent,SUSAN to prox.mid LAD, endeavor to distal LAD     ANGIOGRAM  11/13/13    restenosis, stent LAD     BACK SURGERY      back surgery x3     CV CORONARY ANGIOGRAM N/A 7/8/2019    Procedure: Coronary Angiogram;  Surgeon: Jose Alfredo Crockett MD;  Location:  HEART CARDI[facility] CATH LAB     CV LEFT HEART CATH N/A 7/8/2019     Procedure: Left Heart Cath;  Surgeon: Jose Alfredo Crockett MD;  Location:  HEART CARD[facility] CATH LAB     CV PCI ASPIRATION THROMECTOMY N/A 7/8/2019    Procedure: PCI Aspiration Thrombectomy;  Surgeon: Jose Alfredo Crockett MD;  Location:  HEART CARDI[facility] CATH LAB     CV PCI STENT DRUG ELUTING N/A 7/8/2019    Procedure: PCI Stent Drug Eluting;  Surgeon: Jose Alfredo Crockett MD;  Location:  HEART CARDIAC CATH LAB     HEART CATH LEFT HEART CATH  06/13/2017    SUSAN to OM3     INCISION AND DRAINAGE TOE, COMBINED Bilateral 9/11/2018    Procedure: COMBINED INCISION AND DRAINAGE TOE;  1) Irrigation and debridement ulcer left great toe  2) Amputation 3rd toe right foot at distal interphalangeal joint level;  Surgeon: Miki Harp MD;  Location: [facility] OR     IRRIGATION AND DEBRIDEMENT FOOT, COMBINED Left 3/12/2019    Procedure: Debridement of left foot ulcer sub first MPJ 2 x 2.5 cm down to and including subcutaneous tissue, callus debridement for preulcerative lesion, left second toe.;  Surgeon: Ryan Bhagat DPM;  Location:  OR     ORTHOPEDIC SURGERY      bunion surgery both feet     ORTHOPEDIC SURGERY      left shoulder     SOFT TISSUE SURGERY      debridement of toe numerous time     VASCULAR SURGERY      7 stents in heart     [facility] NONSPECIFIC PROCEDURE      Laminectomy x 3 - (1983 x 2 & 1990)     [facility] NONSPECIFIC PROCEDURE Bilateral 1998    Bunionectomy     [facility] NONSPECIFIC PROCEDURE  1959    Gingival surgery at age 9       Prior to Admission Medications   Prior to Admission Medications   Prescriptions Last Dose Informant Patient Reported? Taking?   Cadexomer Iodine, topical, 0.9% (IODOSORB) 0.9 % GEL gel   No No   Sig: Apply to ulcer on left foot every other day with gauze dressing.   apixaban ANTICOAGULANT (ELIQUIS) 5 MG tablet   No No   Sig: Take 1 tablet (5 mg) by mouth 2 times daily   atorvastatin (LIPITOR) 40 MG tablet   No No   Sig: Take 1 tablet (40 mg) by mouth every evening   carvedilol (COREG) 25 MG  tablet   No No   Sig: Take 1 tablet (25 mg) by mouth 2 times daily (with meals)   clopidogrel (PLAVIX) 75 MG tablet   No No   Sig: Take 1 tablet (75 mg) by mouth daily   furosemide (LASIX) 40 MG tablet   No No   Sig: Take 1 tablet (40 mg) by mouth daily   gabapentin (NEURONTIN) 300 MG capsule   No No   Sig: Take 2-3 capsules (600-900 mg) by mouth At Bedtime   insulin aspart (NOVOLOG FLEXPEN) 100 UNIT/ML pen   Yes No   Sig: Inject Subcutaneous 3 times daily (with meals) Sliding Scale  Do not give sliding scale insulin if Pre-Meal BG less than 140.   For Pre-Meal:   - 200 give 2 units.    - 250 give 4 units.    - 300 give 6 units.    - 350 give 8 units.    - 400 give 10 units.   insulin aspart (NOVOLOG FLEXPEN) 100 UNIT/ML pen   No No   Sig: Take 10 units with breakfast and lunch and 12 units with dinner + correctional scale   insulin glargine (LANTUS PEN) 100 UNIT/ML pen   No No   Sig: Inject 48 Units Subcutaneous every morning   insulin pen needle (31G X 6 MM) 31G X 6 MM miscellaneous   No No   Sig: Use  as directed.   isosorbide mononitrate (IMDUR) 60 MG 24 hr tablet   No No   Sig: Take 1 tablet (60 mg) by mouth daily   levothyroxine (SYNTHROID, LEVOTHROID) 137 MCG tablet  Self Yes No   Sig: Take 137 mcg by mouth every evening    nitroglycerin (NITROSTAT) 0.4 MG sublingual tablet  Self No No   Sig: Place 1 tablet (0.4 mg) under the tongue every 5 minutes as needed for chest pain   order for DME  Self No No   Sig: Equipment being ordered: Walker Wheels () and Walker ()  Treatment Diagnosis: Impaired gait, heel WB bilaterally.   order for DME   No No   Sig: Equipment being ordered: size 12 surgical shoe   pantoprazole (PROTONIX) 40 MG EC tablet   No No   Sig: Take 1 tablet (40 mg) by mouth every morning (before breakfast)   spironolactone (ALDACTONE) 25 MG tablet   No No   Sig: Take 1 tablet (25 mg) by mouth daily      Facility-Administered Medications: None     Allergies    Allergies   Allergen Reactions     No Known Allergies        Social History   I have reviewed this patient's social history and updated it with pertinent information if needed. Jayro Elder  reports that he quit smoking about 15 years ago. His smoking use included cigarettes. He started smoking about 41 years ago. He has a 25.00 pack-year smoking history. He has never used smokeless tobacco. He reports current alcohol use of about 4.0 standard drinks of alcohol per week. He reports that he does not use drugs.    Family History   I have reviewed this patient's family history and updated it with pertinent information if needed.   Family History   Problem Relation Age of Onset     Cancer - colorectal Sister      Thyroid Disease Brother      Cardiovascular Brother      Diabetes Brother      Cancer Father         tumor in chest and throat     Arthritis Mother      Thyroid Disease Mother      Diabetes Mother      Glaucoma No family hx of      Macular Degeneration No family hx of        Review of Systems   The 10 point Review of Systems is negative other than noted in the HPI or here. Dizziness and lightheadedness    Physical Exam   Temp: 97  F (36.1  C) Temp src: Temporal BP: 134/88 Pulse: 75   Resp: 12 SpO2: 98 % O2 Device: None (Room air)    Vital Signs with Ranges  Temp:  [97  F (36.1  C)] 97  F (36.1  C)  Pulse:  [] 75  Resp:  [9-17] 12  BP: (113-140)/() 134/88  SpO2:  [97 %-100 %] 98 %  0 lbs 0 oz    GEN:  Alert, oriented x 3, appears comfortable, NAD.  HEENT:  Normocephalic/atraumatic, no scleral icterus, no nasal discharge, mouth moist.  CV:  Regular rate and rhythm, no murmur or JVD.  S1 + S2 noted, no S3 or S4.  LUNGS:  Clear to auscultation bilaterally without rales/rhonchi/wheezing/retractions.  Symmetric chest rise on inhalation noted.  ABD:  Active bowel sounds, soft, non-tender/non-distended.  No rebound/guarding/rigidity.  EXT:  No edema or cyanosis.  Hands/feet warm to touch with good  signs of peripheral perfusion.  No joint synovitis noted.  SKIN:  Dry to touch, no exanthems noted in the visualized areas.  NEURO:  Symmetric muscle strength, sensation to touch grossly intact.  No new focal deficits appreciated.    Data   Data reviewed today:  I personally reviewed   Recent Labs   Lab 01/03/21  1823   WBC 8.3   HGB 15.0   MCV 87      INR 1.19*   *   POTASSIUM 4.5   CHLORIDE 95   CO2 27   BUN 32*   CR 1.30*   ANIONGAP 6   BETTIE 9.1   *   TROPI <0.015       Recent Results (from the past 24 hour(s))   XR Chest Port 1 View    Narrative    EXAM: XR CHEST PORT 1 VW  LOCATION: [facility]  DATE/TIME: 1/3/2021 7:12 PM    INDICATION: Chest pain   COMPARISON: 10/09/2019       Impression    IMPRESSION: No pleural fluid or pneumothorax. No airspace disease or edema. The cardiomediastinal silhouette is at the upper limits of normal. Coronary artery disease is suspected. Old left AC joint separation noted. Old left rib fractures noted.

## 2021-01-04 NOTE — ED NOTES
[facility]  ED Nurse Handoff Report    Jayro Elder is a 70 year old male   ED Chief complaint: Dizziness, Fatigue, and Nausea  . ED Diagnosis:   Final diagnoses:   Dizziness     Allergies:   Allergies   Allergen Reactions     No Known Allergies        Code Status: Full Code  Activity level - Baseline/Home:  Independent. Activity Level - Current:   Stand by Assist. Lift room needed: No. Bariatric: No   Needed: No   Isolation: Yes. Infection: COVID r/o and special precautions.     Vital Signs:   Vitals:    01/03/21 1930 01/03/21 1945 01/03/21 2000 01/03/21 2015   BP: (!) 120/97 113/83 (!) 123/99 (!) 136/105   Pulse: 89 81 98 93   Resp: 16 11 11 9   Temp:       TempSrc:       SpO2: 97% 100%         Cardiac Rhythm:  ,   Cardiac  Cardiac Rhythm: Atrial fibrillation  Pain level:    Patient confused: No. Patient Falls Risk: Yes.   Elimination Status: Has voided   Patient Report - Initial Complaint: Dizziness. Focused Assessment: Jayro Elder is a 70 year old male with a history of CAD, hypertension, hyperlipidemia, atrial fibrillation on Eliquis and Plavix, cardiomyopathy, MI, CVA, and type II diabetes on insulin, who presents for evaluation of lightheadedness that has been present for the last week now. The patient reports that this lightheadedness is significantly worse when standing up. He also reports some intermittent chest pain that happens several times a day. He is unsure if this is exertional in nature because he hasn't been very active recently secondary to his ongoing foot issues. Denies any fever or cough. He notes that he has been taking his medications regularly; however, he did forget to take his Lasix this morning. Denies any known COVID contacts.     Tests Performed: Labs, Orthostatic BP, XR Abnormal Results:   Abnormal Labs Reviewed   BASIC METABOLIC PANEL - Abnormal; Notable for the following components:       Result Value    Sodium 128 (*)     Glucose 473 (*)     Urea  Nitrogen 32 (*)     Creatinine 1.30 (*)     GFR Estimate 55 (*)     All other components within normal limits   INR - Abnormal; Notable for the following components:    INR 1.19 (*)     All other components within normal limits     Positive Orthostatic BP     Treatments provided: see emar   Family Comments: none at bedside  OBS brochure/video discussed/provided to patient:  Yes  ED Medications:   Medications   0.9% sodium chloride BOLUS (0 mLs Intravenous Stopped 1/3/21 2010)     Drips infusing:  No  For the majority of the shift, the patient's behavior Green. Interventions performed were NA.    Sepsis treatment initiated: No     Patient tested for COVID 19 prior to admission: YES    ED Nurse Name/Phone Number: Yael Park RN,   9:00 PM    RECEIVING UNIT ED HANDOFF REVIEW    Above ED Nurse Handoff Report was reviewed: Yes  Reviewed by: Mariana Brooks RN on January 3, 2021 at 9:17 PM

## 2021-01-04 NOTE — DISCHARGE SUMMARY
Discharge Summary  Hospitalist Service    Jayro Elder MRN# [id number]   YOB: 1950 Age: 70 year old     Date of Admission:  1/3/2021  Date of Discharge:  1/4/2021  Admitting Physician: Betito Crespo MD  Discharge Physician: Mel Perez PA-C  Discharging Service: Hospitalist Service     Primary Provider: Davion Cordova  Primary Care Physician Phone Number: [phone]         Discharge Diagnoses/Problem Oriented Hospital Course (Providers):    Jayro Elder was admitted on 1/3/2021 by Betito Crespo MD and I would refer you to their history and physical.  Briefly, patient was admitted with complaints of dizziness (not vertigo) and was found to be orthostatic in the ER. Metabolic work up was remarkable for elevated creatinine of 1.3, sodium 128 and glucose 473. CBC ws normal. Troponin undetectable, d dimer normal, and BNP normal. CXR showed no edema but heart was enlarged. COVID negative. ECG showed A fib with RVR. His diuretics were held and he received a total of 1 litre of fluids with improvement of orthostatics and creatinine. Cardiology consulted and recommended reducing Imdur to 30 mg, Lasix to 20 mg and stopping Spironolactone. I have ordered an abdominal binder for him. We also noted his glucoses were elevated and he reported unable to check blood sugars frequently due to calloused fingers. We increased his Aspart to 12 units before breakfast and lunch and 14 units before dinner. Also, ordered freestyle moises for glucose monitoring. Recommend follow up with cardiology in 2 weeks and endocrinologist.          Code Status:      Full Code        Brief Hospital Stay Summary Sent Home With Patient in AVS:        Reason for your hospital stay      You were admitted with concerns of dizziness. We noted that your blood   pressure was low and your heart rate was high with standing (this is   called orthostatic hypotension). We held your cardiac medications  and gave   you fluids with improvement of the numbers. You were seen by Cardiology   and your Lasix was reduced to 20 mg daily, Imdur to 30 mg and stop your   Spironolactone. Unfortunately, this increases your risk of fluid overload   and heart failure so you will have to watch for signs of this (leg   swelling, shortness of breath, cough).    We have also provided you with an abdominal binder that you can wear when   you are standing. This will hopefully keep your blood pressure up and   prevent you from feeling really dizzy.     We also noted how high your blood sugars were. We talked with our diabetic   specialist who recommended you increase your Aspart to 12 units before   breakfast and lunch and 14 units before dinner. We provided you with a   clarita glucometer so you do not have to prick your finger for glucose   checks. This way you can check your blood sugar before eating and use your   insulin sliding scale.    You should follow up with your diabetic provider as soon as you can and   cardiology in 2 weeks.                           Pending Results:        Unresulted Labs Ordered in the Past 30 Days of this Admission     No orders found for last 31 day(s).            Discharge Instructions and Follow-Up:      Follow-up Appointments     Follow-up and recommended labs and tests       Follow up with cardiology in 2 weeks.    Follow up with diabetic provider or primary care in 1 week               Discharge Disposition:      Discharged to home         Discharge Medications:        Current Discharge Medication List      START taking these medications    Details   Continuous Blood Gluc  (FREESTYLE CLARITA 2 READER SYSTM) DRAGAN 1 Device daily  Qty: 1 each, Refills: 3    Associated Diagnoses: Type 2 diabetes mellitus with diabetic peripheral angiopathy without gangrene, with long-term current use of insulin (H)      Continuous Blood Gluc Sensor (FREESTYLE CLARITA 2 SENSOR SYSTM) MISC 1 Units 4 times daily (before  meals and nightly)  Qty: 1 each, Refills: 3    Associated Diagnoses: Type 2 diabetes mellitus with diabetic peripheral angiopathy without gangrene, with long-term current use of insulin (H)         CONTINUE these medications which have CHANGED    Details   furosemide (LASIX) 40 MG tablet Take 0.5 tablets (20 mg) by mouth daily  Qty: 90 tablet, Refills: 0    Associated Diagnoses: Ischemic cardiomyopathy      !! insulin aspart (NOVOLOG PEN) 100 UNIT/ML pen Inject 12 Units Subcutaneous 2 times daily (with meals) With breakfast and lunch    Associated Diagnoses: Type 2 diabetes mellitus with diabetic peripheral angiopathy without gangrene, with long-term current use of insulin (H)      !! insulin aspart (NOVOLOG PEN) 100 UNIT/ML pen Inject 14 Units Subcutaneous daily (with dinner)    Associated Diagnoses: Type 2 diabetes mellitus with diabetic peripheral angiopathy without gangrene, with long-term current use of insulin (H)      isosorbide mononitrate (IMDUR) 30 MG 24 hr tablet Take 1 tablet (30 mg) by mouth daily  Qty: 30 tablet, Refills: 1    Associated Diagnoses: Ischemic cardiomyopathy       !! - Potential duplicate medications found. Please discuss with provider.      CONTINUE these medications which have NOT CHANGED    Details   apixaban ANTICOAGULANT (ELIQUIS) 5 MG tablet Take 1 tablet (5 mg) by mouth 2 times daily    Associated Diagnoses: Chronic atrial fibrillation (H)      atorvastatin (LIPITOR) 40 MG tablet Take 1 tablet (40 mg) by mouth every evening  Qty: 90 tablet, Refills: 3    Associated Diagnoses: Cerebrovascular accident (CVA) due to embolism of left posterior cerebral artery (H)      carvedilol (COREG) 25 MG tablet Take 1 tablet (25 mg) by mouth 2 times daily (with meals)  Qty: 180 tablet, Refills: 3    Associated Diagnoses: Essential hypertension      clopidogrel (PLAVIX) 75 MG tablet Take 1 tablet (75 mg) by mouth daily  Qty: 90 tablet, Refills: 3    Associated Diagnoses: Unstable angina (H)      !!  gabapentin (NEURONTIN) 300 MG capsule Take 300 mg by mouth At Bedtime      !! gabapentin (NEURONTIN) 300 MG capsule Take 300 mg by mouth nightly as needed In addition to scheduled dose.      !! insulin aspart (NOVOLOG FLEXPEN) 100 UNIT/ML pen Inject Subcutaneous 3 times daily (with meals) Sliding Scale  Do not give sliding scale insulin if Pre-Meal BG less than 140.   For Pre-Meal:   - 200 give 2 units.    - 250 give 4 units.    - 300 give 6 units.    - 350 give 8 units.    - 400 give 10 units.      insulin glargine (LANTUS PEN) 100 UNIT/ML pen Inject 43-45 Units Subcutaneous every morning    Comments: If Lantus is not covered by insurance, may substitute Basaglar at same dose and frequency.        levothyroxine (SYNTHROID, LEVOTHROID) 137 MCG tablet Take 137 mcg by mouth every evening   Qty: 90 tablet, Refills: 3      pantoprazole (PROTONIX) 40 MG EC tablet Take 1 tablet (40 mg) by mouth every morning (before breakfast)  Qty: 90 tablet, Refills: 1    Associated Diagnoses: Gastroesophageal reflux disease, esophagitis presence not specified      Cadexomer Iodine, topical, 0.9% (IODOSORB) 0.9 % GEL gel Apply to ulcer on left foot every other day with gauze dressing.  Qty: 40 g, Refills: 0    Associated Diagnoses: Diabetic infection of left foot (H)      insulin pen needle (31G X 6 MM) 31G X 6 MM miscellaneous Use  as directed.  Qty: 100 each, Refills: 11    Associated Diagnoses: Type 2 diabetes mellitus with hyperosmolarity without coma, with long-term current use of insulin (H)      nitroglycerin (NITROSTAT) 0.4 MG sublingual tablet Place 1 tablet (0.4 mg) under the tongue every 5 minutes as needed for chest pain  Qty: 50 tablet, Refills: 0    Associated Diagnoses: Other chest pain      !! order for DME Equipment being ordered: size 12 surgical shoe  Qty: 1 Device, Refills: 0    Associated Diagnoses: Diabetic polyneuropathy associated with type 2 diabetes mellitus (H); Ulcer of left  "foot, with fat layer exposed (H)      !! order for DME Equipment being ordered: Walker Wheels () and Walker ()  Treatment Diagnosis: Impaired gait, heel WB bilaterally.  Qty: 1 each, Refills: 0    Associated Diagnoses: Diabetic ulcer of left midfoot associated with diabetes mellitus of other type, unspecified ulcer stage (H)       !! - Potential duplicate medications found. Please discuss with provider.      STOP taking these medications       spironolactone (ALDACTONE) 25 MG tablet Comments:   Reason for Stopping:                 Allergies:         Allergies   Allergen Reactions     No Known Allergies            Consultations This Hospital Stay:      Consultation during this admission received from cardiology         Condition and Physical on Discharge:      Discharge condition: Stable   Vitals: Blood pressure 127/73, pulse 75, temperature 98  F (36.7  C), temperature source Oral, resp. rate 18, height 1.93 m (6' 4\"), weight 112.4 kg (247 lb 11.2 oz), SpO2 97 %.     Constitutional: Alert and orientated x 3   Lungs: CTAB   Cardiovascular: RRR with no murmur   Abdomen: Bowel sounds are present with no tenderness    Skin: No rash or open sores   Other:          Discharge Time:      Less than 30 minutes.        Image Results From This Hospital Stay (For Non-EPIC Providers):        Results for orders placed or performed during the hospital encounter of 01/03/21   XR Chest Port 1 View    Narrative    EXAM: XR CHEST PORT 1 VW  LOCATION: [facility]  DATE/TIME: 1/3/2021 7:12 PM    INDICATION: Chest pain   COMPARISON: 10/09/2019       Impression    IMPRESSION: No pleural fluid or pneumothorax. No airspace disease or edema. The cardiomediastinal silhouette is at the upper limits of normal. Coronary artery disease is suspected. Old left AC joint separation noted. Old left rib fractures noted.   Echocardiogram Complete    Narrative    [id number]  [id number]  [id number]  [id number]^JALETA^CHERINET^NEGERI         "   [facility]  Echocardiography Laboratory  [address]  [place] [address]        Name: PORTER YANCEY  MRN: [id number]  : 1950  Study Date: 2021 07:26 AM  Age: 70 yrs  Gender: Male  Patient Location: [facility]  Reason For Study: Dizziness  Ordering Physician: MORGAN MARIE  Referring Physician: Davion Cordova  Performed By: Katelin Deleon RDCS     BSA: 2.5 m2  Height: 76 in  Weight: 257 lb  HR: 86  BP: 138/72 mmHg  _____________________________________________________________________________  __        Procedure  Complete Portable Echo Adult. Optison (NDC #1665-2100) given intravenously.  _____________________________________________________________________________  __        Interpretation Summary     The visual ejection fraction is estimated at 30-35%.  Left ventricular systolic function is moderately reduced.  There are regional wall motion abnormalities as specified.  The study was technically difficult. Compared to prior study, there is no  significant change.  _____________________________________________________________________________  __        Left Ventricle  The left ventricle is normal in size. There is normal left ventricular wall  thickness. Diastolic Doppler findings (E/E' ratio and/or other parameters)  suggest left ventricular filling pressures are increased. The visual ejection  fraction is estimated at 30-35%. Left ventricular systolic function is  moderately reduced. Anteroseptal akinesis. Only the basal anterospetum  contracts normally. The distal inferoseptum is akinetic. distal inferior wall  akinesis. Distal anterior akinesis. Distal inferolateral akinesis. There is  apical akinesis. Septal motion is consistent with conduction abnormality.  There are regional wall motion abnormalities as specified.     Right Ventricle  The right ventricle is normal size. Mildly decreased right ventricular  systolic function.     Atria  The left atrium is mildly  dilated. The right atrium is mildly dilated. There  is no color Doppler evidence of an atrial shunt.     Mitral Valve  There is trace mitral regurgitation.        Tricuspid Valve  There is trace tricuspid regurgitation. The right ventricular systolic  pressure is approximated at 16.7 mmHg plus the right atrial pressure.     Aortic Valve  There is trace aortic regurgitation. No hemodynamically significant valvular  aortic stenosis.     Pulmonic Valve  There is trace pulmonic valvular regurgitation.     Vessels  The aortic root is normal size. IVC diameter <2.1 cm collapsing >50% with  sniff suggests a normal RA pressure of 3 mmHg.     Pericardium  There is no pericardial effusion.        Rhythm  The rhythm was atrial fibrillation.  _____________________________________________________________________________  __  MMode/2D Measurements & Calculations  IVSd: 1.1 cm     LVIDd: 4.9 cm  LVIDs: 3.5 cm  LVPWd: 1.1 cm  FS: 28.5 %  LV mass(C)d: 196.9 grams  LV mass(C)dI: 79.9 grams/m2  Ao root diam: 3.3 cm  LA Volume (BP): 83.6 ml  LA Volume Index (BP): 34.0 ml/m2  RWT: 0.44        Doppler Measurements & Calculations  MV E max john: 92.8 cm/sec     MV max P.7 mmHg  MV mean P.2 mmHg  MV V2 VTI: 22.2 cm  MV dec time: 0.22 sec  AI P1/2t: 594.7 msec  TR max john: 204.1 cm/sec  TR max P.7 mmHg  E/E' av.8  Lateral E/e': 12.4  Medial E/e': 15.3           _____________________________________________________________________________  __           Report approved by: Hank Varghese 2021 10:50 AM              Most Recent Lab Results In EPIC (For Non-EPIC Providers):    Most Recent 3 CBC's:  Recent Labs   Lab Test 21  0616 21  1823 20  2110   WBC 6.1 8.3 6.0   HGB 13.9 15.0 12.3*   MCV 88 87 91    242 202      Most Recent 3 BMP's:  Recent Labs   Lab Test 21  0616 21  1823 20  1458    128* 133   POTASSIUM 3.6 4.5 4.5   CHLORIDE 102 95 101   CO2 30 27 29   BUN  25 32* 19   CR 1.06 1.30* 1.19   ANIONGAP 2* 6 3   BETTIE 8.9 9.1 8.6   * 473* 419*     Most Recent 3 Troponin's:No lab results found.  Most Recent 3 INR's:  Recent Labs   Lab Test 01/03/21  1823 05/05/19  0218 01/20/19  0600   INR 1.19* 1.34* 1.43*     Most Recent 2 LFT's:  Recent Labs   Lab Test 01/17/19  1652 01/13/19  0702   AST 10 10   ALT 17 15   ALKPHOS 77 66   BILITOTAL 1.2 0.5     Most Recent Cholesterol Panel:  Recent Labs   Lab Test 08/13/20  1028   CHOL 107   LDL 53   HDL 45   TRIG 44     Most Recent 6 Bacteria Isolates From Any Culture (See EPIC Reports for Culture Details):  Recent Labs   Lab Test 07/16/20  1908 07/16/20  1744 04/18/20  0915 05/05/19  0437 05/05/19  0219 03/12/19  1642   CULT No growth No growth Heavy growth  Anaerococcus species  Susceptibility testing not routinely done  *  Heavy growth  Staphylococcus aureus  *  Heavy growth  Streptococcus agalactiae sero group B  *  Moderate growth  Corynebacterium species  Identification obtained by MALDI-TOF mass spectrometry research use only database. Test   characteristics determined and verified by the Infect[facility] Diagnostic Laboratory   ([facility]) [place], [place].  Susceptibility testing not routinely done  * No growth No growth Heavy growth  Anaerococcus species  No further identification  Susceptibility testing not routinely done  *  Heavy growth  Anaerobic gram positive cocci  No further identification  Susceptibility testing not routinely done  *  Heavy growth  Staphylococcus aureus  *  Light growth  Enterococcus faecalis  *     Most Recent TSH, T4 and HgbA1c:   Recent Labs   Lab Test 09/15/20  0953 10/09/19  1230 10/09/19  1230   TSH  --   --  1.80   A1C 9.8*   < >  --     < > = values in this interval not displayed.

## 2021-01-04 NOTE — PLAN OF CARE
PRIMARY DIAGNOSIS: VERTIGO    OUTPATIENT/OBSERVATION GOALS TO BE MET BEFORE DISCHARGE  1. Orthostatic performed: No    2. Completion of appropriate imaging: Yes    3. Tolerating PO medications: Yes    4. Return to near baseline physical activity: No    5. Cleared for discharge by consultants (if involved): No    Pt is A&O x4. VSS. Reports slight shoulder pain, 2/10. SBA. Reports increased dizziness when ambulating. Glucose monitoring and insulin given. Cardio and PT consult. Will provide supportive cares.     Discharge Planner Nurse   Safe discharge environment identified: Yes  Barriers to discharge: Yes       Entered by: Mariana Brooks 01/04/2021 3:50 AM     Please review provider order for any additional goals.   Nurse to notify provider when observation goals have been met and patient is ready for discharge.

## 2021-01-04 NOTE — PHARMACY-RX INSURANCE COVERAGE
Coverage check on Freestyle Joey.  Patient has [facility] Medicare Advantage.    [place]: $14.  Sensors: $21.50/mo.    GIUSEPPE Rhodes, Pharmacy Technician/Liaison, Discharge Pharmacy [phone]

## 2021-01-04 NOTE — PLAN OF CARE
PRIMARY DIAGNOSIS: Dizziness, Chest Pain, Weakness    OUTPATIENT/OBSERVATION GOALS TO BE MET BEFORE DISCHARGE  1. Orthostatic performed: Yes         Lying Orthostatic BP: 132/86         Sitting Orthostatic BP: 148/67         Standing Orthostatic BP: 121/72     2. Tolerating PO medications: Yes    3. Return to near baseline physical activity: Yes    4. Cleared for discharge by consultants (if involved): Yes    Discharge Planner Nurse   Safe discharge environment identified: Yes  Barriers to discharge: Yes - waiting for medications from [facility].       Entered by: Batsheva Cornell 01/04/2021       VSS. Orthostatic blood pressure completed with and without abdominal binder. Reports dizziness with sitting and standing but states this has improved since admission. Up with SBA d/t dizziness, ambulated nevarez with GB & walker. Trace edema in bilateral ankles. Bolus completed; now SL. Cardiology saw; adjusted medications. Patient to discharge this evening once medications are filled.     Please review provider order for any additional goals.   Nurse to notify provider when observation goals have been met and patient is ready for discharge.

## 2021-01-05 ENCOUNTER — TELEPHONE (OUTPATIENT)
Dept: FAMILY MEDICINE | Facility: CLINIC | Age: 71
End: 2021-01-05

## 2021-01-05 ENCOUNTER — TELEPHONE (OUTPATIENT)
Dept: CARDIOLOGY | Facility: CLINIC | Age: 71
End: 2021-01-05

## 2021-01-05 NOTE — TELEPHONE ENCOUNTER
Patient was evaluated by cardiology while inpatient for dizziness and fatigue, orthostatic hypotension. PMH: diabetes, hypothyroidism, hypertension, CVA, untreated sleep apnea, CKD, permanent atrial fibrillation, coronary artery disease, status post PCI with resultant ischemic cardiomyopathy, osteomyelitis, status post lower extremity digit amputation. PTA Imdur and Lasix was decreased and Spironolactone was discontinued. Writer attempted to call patient to discuss any post hospital d/c questions he may have, review medication changes, and confirm f/u appts, but not available. Will try back at a later date. RN will remind patient that he still needs to be scheduled for labs and cardiology FLORA as ordered-scheduling phone number to be provided. ESEQUIEL Pride RN.

## 2021-01-05 NOTE — TELEPHONE ENCOUNTER
"Called the pt.  He said he can't talk now.  He is frustrated as he says he is dizzy.  He says, \"it is the same crap, just a different day.\"   Offered to schedule an appt with Ian Cordova.  He said he will have to call back.    "

## 2021-01-05 NOTE — PLAN OF CARE
Patient's After Visit Summary was reviewed with patient.  Patient verbalized understanding of After Visit Summary, recommended follow up and was given an opportunity to ask questions.   Discharge medications sent home with patient/family: YES, sent with Imdur and glucose monitoring sensors. Will  monitor at [facility] tomorrow.   Discharged with son.

## 2021-01-05 NOTE — TELEPHONE ENCOUNTER
Please contact patient for In-patient follow up.  [phone] (home)     Visit date: 01/04/2021  Diagnosis listed:Dizziness, Orthostatic Hypotension  Number of visits in past 12 months:1/1

## 2021-01-06 ENCOUNTER — CARE COORDINATION (OUTPATIENT)
Dept: CARDIOLOGY | Facility: CLINIC | Age: 71
End: 2021-01-06

## 2021-01-06 NOTE — PROGRESS NOTES
Reached out to Jayro to check in with him. Message left. Will try again tomorrow.    Princess Chapa RN 4:57 PM 01/06/21

## 2021-01-07 NOTE — TELEPHONE ENCOUNTER
Writer called pt and he continues to have some episodes of lightheadedness. No other complaints. Denies any medication questions. Instructed pt to make position changes slowly and fluids encouraged. Reminded pt of need to schedule f/u appts as below and scheduling phone number provided. Verbalized understanding without further questions. ESEQUIEL Pride RN.

## 2021-01-07 NOTE — TELEPHONE ENCOUNTER
ED / Discharge Outreach Protocol    Patient Contact    Attempt # 2    Was call answered?  No.  Left message on voicemail with information to call me back.    Laya Yo RN on 1/7/2021 at 9:10 AM

## 2021-01-08 NOTE — PROGRESS NOTES
"Spoke to Jayro. Flat affect. He is unsure if he wants to schedule an appt as he is not sure who to see. I gave him some recommendations and will mail him some options. Jayro is upset that Dr Tomlin is retiring and Gill is not in [facility] any longer, \"this is the 4th Dr that's leaving me\" .   Jayro is in agreement to review recommendations when he receives them.  Princess Chapa RN 11:29 AM 01/08/21      "

## 2021-01-26 ENCOUNTER — HOSPITAL ENCOUNTER (EMERGENCY)
Facility: CLINIC | Age: 71
Discharge: HOME | End: 2021-01-26
Attending: PHYSICIAN ASSISTANT | Admitting: PHYSICIAN ASSISTANT
Payer: COMMERCIAL

## 2021-01-26 VITALS
HEART RATE: 84 BPM | SYSTOLIC BLOOD PRESSURE: 136 MMHG | RESPIRATION RATE: 18 BRPM | TEMPERATURE: 97.7 F | DIASTOLIC BLOOD PRESSURE: 86 MMHG | OXYGEN SATURATION: 99 %

## 2021-01-26 DIAGNOSIS — R04.0 EPISTAXIS: ICD-10-CM

## 2021-01-26 PROCEDURE — 99282 EMERGENCY DEPT VISIT SF MDM: CPT

## 2021-01-26 RX ORDER — OXYMETAZOLINE HYDROCHLORIDE 0.05 G/100ML
SPRAY NASAL
Status: DISCONTINUED
Start: 2021-01-26 | End: 2021-01-26 | Stop reason: HOSPADM

## 2021-01-26 ASSESSMENT — ENCOUNTER SYMPTOMS
DIARRHEA: 0
SHORTNESS OF BREATH: 0
VOMITING: 0
NAUSEA: 0
SLEEP DISTURBANCE: 1

## 2021-01-26 NOTE — ED PROVIDER NOTES
History   Chief Complaint:  Epistaxis     The history is provided by the patient.      Jayro Elder is an anticoagulated 70 year old male with history of atrial fibrillation, CAD, CHF, cancer, cardiomyopathy, cerebral infarction, type II diabetes, hypertension, MI, sepsis, MARISOL, and thyroid disease, who presents with intermittent epistaxis for the last couple of days. Last night, the patient states that he developed another bloody nose, though notes this one would not stop, causing him to not sleep much. He denies any trauma, congestion, or rhinorrhea prior to the development  He has tried using Afrin, applying pressure, and sticking Kleenex up his nose to try to stop it, but found no relief with that. He also reports he has been swallowing a decent amount of blood but denies any nausea or vomiting.     Here, a clamp was placed over the patient's nose in triage. Patient denies any chest pain, shortness of breath, or diarrhea. He notes he has never needed to come to the ED for a bloody nose in the past.     Review of Systems   HENT: Positive for nosebleeds and postnasal drip.    Respiratory: Negative for shortness of breath.    Cardiovascular: Negative for chest pain.   Gastrointestinal: Negative for diarrhea, nausea and vomiting.   Psychiatric/Behavioral: Positive for sleep disturbance (secondary to his bloody nose).   All other systems reviewed and are negative.      Allergies:  No Known Allergies    Medications:  Eliquis  Lipitor  Coreg  Plavix  Lasix  Gabapentin   Insulin aspart  Insulin glargine  Levothyroxine  Protonix    Past Medical History:    CAD  CHF  Cancer  Cardiomyopathy  Cellulitis  Cerebral infarction  Diabetic ulcer  Hypertension  Generalized osteoarthrosis  MI  Sepsis  MARISOL  Substance abuse  Thyroid disease  Type II diabetes    Amputation of lesser toe  Atrial fibrillation     Past Surgical History:    Amputate toes x5  Angiogram x5  Back surgery x3  Left heart catheterization x2  PCI aspiration  thrombectomy  PCI stent placement  I&D, foot, x3  Bunion surgery  Left shoulder surgery  Laminectomy x3  Gingival surgery     Family History:    Colorectal cancer  Thyroid disease  Cardiovascular   Diabetes  Arthritis    Social History:  Smoking status: Former - quit date: 2005  Alcohol use: Positive   Drug use: Negative   Marital Status:   Presents to the ED alone.     Physical Exam     Patient Vitals for the past 24 hrs:   BP Temp Temp src Pulse Resp SpO2   01/26/21 1302 136/86 97.7  F (36.5  C) Temporal 84 18 99 %       Physical Exam  Constitutional: Pleasant. Cooperative.  Eyes: Pupils equally round  HENT: Head is normal in appearance. Oropharynx is normal with moist mucus membranes. Bleeding from left naris.   Cardiovascular: Regular rate and rhythm without murmurs.  Respiratory: Normal respiratory effort, lungs clear to auscultation  Musculoskeletal: No asymmetry  Skin: Normal, without rash.  Neurologic: Cranial nerves grossly intact, normal cognition, no apparent deficits.  Psychiatric: Normal affect.  Nursing notes and vital signs reviewed.    Emergency Department Course     Emergency Department Course:    Reviewed:  1350 I reviewed the patient's nursing notes, vitals, past medical records, and Care Everywhere.     Assessments:  1355 I performed an exam of the patient as documented above.     1405 I rechecked the patient and attempted to cease his nose bleed using Afrin.      1450 I rechecked the patient and discussed the results of his workup thus far. There is no active bleeding noted at this time.    1538 I rechecked the patient and discussed the results of his workup thus far.     1551 I rechecked the patient and discussed the results of his workup thus far.     Disposition:  The patient was discharged to home.     Impression & Plan     Medical Decision Making:  Jayro Elder is a 70 year old male who presents for evaluation of nasal bleeding and epistaxis on anticoagulants.  The bleeding was  controlled with interventions in the ED including Afrin and consistent pressure and therefore supportive outpatient management is indicated.  Close follow up with PCP. There are no signs of coagulopathy causing the bleeding or a general medical condition (thrombocytopenia, DIC, leukemia, etc) causing the bleeding today. No other symptoms suggesting need for further work up. [employer] patient was safe for discharge to home. Discussed reasons to return. All questions answered. Patient discharged to home in stable condition.    Diagnosis:    ICD-10-CM    1. Epistaxis  R04.0        Disposition:  Discharged to home.    Discharge Medications:  None    Scribe Disclosure:  I, Tyra Lopez, am serving as a scribe on 1/26/2021 at 1:52 PM to personally document services performed by Don Cobb PA-C based on my observations and the provider's statements to me.     This record was created at least in part using electronic voice recognition software, so please excuse any typographical errors.       Don Cobb PA-C  01/26/21 1424

## 2021-01-26 NOTE — ED TRIAGE NOTES
Patient presents with complaints of intermittent bloody nose since yesterday. Patient states that it started bleeding again early this morning and he has been unable to stop bleeding.Patient states he can feel blood dripping down the back of his throat. Patient is on thinners. Nasal clip applied in triage. ABC intact without need for intervention at this time.

## 2021-01-27 ENCOUNTER — CARE COORDINATION (OUTPATIENT)
Dept: CARDIOLOGY | Facility: CLINIC | Age: 71
End: 2021-01-27

## 2021-01-27 DIAGNOSIS — I25.10 CAD (CORONARY ARTERY DISEASE): ICD-10-CM

## 2021-01-27 NOTE — PROGRESS NOTES
"Jayro is requesting refill on isosorbide mononitrate. He is over due for follow and declines to make an appt as he feels \"no one listens to him\"    Called Jayro to see if he would like to make appt with Flavio and then we can refill his medications. Unable to leave voice mail.   Princess Chapa RN 2:16 PM 01/27/21      "

## 2021-01-29 ENCOUNTER — NURSE TRIAGE (OUTPATIENT)
Dept: NURSING | Facility: CLINIC | Age: 71
End: 2021-01-29

## 2021-01-29 DIAGNOSIS — R06.02 SOB (SHORTNESS OF BREATH): Primary | ICD-10-CM

## 2021-01-29 DIAGNOSIS — I25.5 ISCHEMIC CARDIOMYOPATHY: ICD-10-CM

## 2021-01-29 RX ORDER — ISOSORBIDE MONONITRATE 30 MG/1
30 TABLET, EXTENDED RELEASE ORAL DAILY
Qty: 90 TABLET | Refills: 0 | Status: SHIPPED | OUTPATIENT
Start: 2021-01-29 | End: 2021-02-09

## 2021-01-29 RX ORDER — ISOSORBIDE MONONITRATE 60 MG/1
TABLET, EXTENDED RELEASE ORAL
Qty: 90 TABLET | Refills: 3 | OUTPATIENT
Start: 2021-01-29

## 2021-01-29 NOTE — PROGRESS NOTES
Placed CORE Follow-up orders for upcoming appt with Flavio.     Received refill request for:   Imdur                    Last OV was:  11/30/2020 W/Gill Doty,  PAC  Labs/EKG:  Na   F/U scheduled:   2/9/2021 w/Flavio  New script sent to:    CÉSAR Chapa RN 1:49 PM 01/29/21

## 2021-01-29 NOTE — TELEPHONE ENCOUNTER
Rx changed to 30mg on 1/4/2021.    isosorbide mononitrate (IMDUR) 30 MG 24 hr tablet 30 tablet 1 1/4/2021  No   Sig - Route: Take 1 tablet (30 mg) by mouth daily - Oral   Sent to pharmacy as: Isosorbide Mononitrate ER 30 MG Oral Tablet Extended Release 24 Hour (IMDUR)   Class: E-Prescribe   Order: [id number]   E-Prescribing Status: Receipt confirmed by pharmacy (1/4/2021  3:46 PM CST)     Ela Verde RN

## 2021-01-29 NOTE — TELEPHONE ENCOUNTER
"Patient was recently hospitalize   Imdur 60 down to 30mg Unable to cut the pills in half.   Needs a refill of the Imdur he is running out.   Advised him of the message from 1/27/2021  Transferred to Scheduling, while waiting for scheduling able to reach RN at clinic to clarify provider patient is make an appointment with and per  Princess TREVINO RN \"Please put him with Flavio Lopez. You can use a CORE new or urgent for him. He will need labs prior, nonfasting BMP & BNP\". Orders placed. Warm transferred to scheduling and advised patient that he will need his labs drawn for the appointment. Patient was not happy stating that he had a lot of blood drawn while he was hospitalized. Patient advised that the medication refill would be authorized.     Agata Mazariegos RN on 1/29/2021 at 2:05 PM        Reason for Disposition    Request for URGENT new prescription or refill of 'essential' medication (i.e., likelihood of harm to patient if not taken) and triager unable to fill per department policy    Additional Information    Negative: Drug overdose and triager unable to answer question    Negative: Caller requesting information unrelated to medicine    Negative: Caller requesting a prescription for Strep throat and has a positive culture result    Negative: Rash while taking a medication or within 3 days of stopping it    Negative: Immunization reaction suspected    Negative: Asthma and having symptoms of asthma (cough, wheezing, etc.)    Negative: Breastfeeding questions about mother's medicines and diet    Negative: MORE THAN A DOUBLE DOSE of a prescription or over-the-counter (OTC) drug    Negative: DOUBLE DOSE (an extra dose or lesser amount) of over-the-counter (OTC) drug and any symptoms (e.g., dizziness, nausea, pain, sleepiness)    Negative: DOUBLE DOSE (an extra dose or lesser amount) of prescription drug and any symptoms (e.g., dizziness, nausea, pain, sleepiness)    Negative: Took another person's prescription drug    " Negative: DOUBLE DOSE (an extra dose or lesser amount) of prescription drug and NO symptoms (Exception: a double dose of antibiotics)    Negative: Diabetes drug error or overdose (e.g., took wrong type of insulin or took extra dose)    Negative: Caller has medication question about med not prescribed by PCP and triager unable to answer question (e.g., compatibility with other med, storage)    Protocols used: MEDICATION QUESTION CALL-A-OH

## 2021-02-09 ENCOUNTER — OFFICE VISIT (OUTPATIENT)
Dept: CARDIOLOGY | Facility: CLINIC | Age: 71
End: 2021-02-09
Attending: NURSE PRACTITIONER
Payer: COMMERCIAL

## 2021-02-09 VITALS
HEART RATE: 76 BPM | WEIGHT: 259.1 LBS | OXYGEN SATURATION: 96 % | SYSTOLIC BLOOD PRESSURE: 88 MMHG | DIASTOLIC BLOOD PRESSURE: 60 MMHG | BODY MASS INDEX: 31.54 KG/M2

## 2021-02-09 DIAGNOSIS — I25.5 ISCHEMIC CARDIOMYOPATHY: ICD-10-CM

## 2021-02-09 DIAGNOSIS — R06.02 SOB (SHORTNESS OF BREATH): ICD-10-CM

## 2021-02-09 LAB
ANION GAP SERPL CALCULATED.3IONS-SCNC: 5 MMOL/L (ref 3–14)
BUN SERPL-MCNC: 18 MG/DL (ref 7–30)
CALCIUM SERPL-MCNC: 8.4 MG/DL (ref 8.5–10.1)
CHLORIDE SERPL-SCNC: 106 MMOL/L (ref 94–109)
CO2 SERPL-SCNC: 29 MMOL/L (ref 20–32)
CREAT SERPL-MCNC: 1.14 MG/DL (ref 0.66–1.25)
GFR SERPL CREATININE-BSD FRML MDRD: 65 ML/MIN/{1.73_M2}
GLUCOSE SERPL-MCNC: 175 MG/DL (ref 70–99)
NT-PROBNP SERPL-MCNC: 1052 PG/ML (ref 0–125)
POTASSIUM SERPL-SCNC: 4.1 MMOL/L (ref 3.4–5.3)
SODIUM SERPL-SCNC: 140 MMOL/L (ref 133–144)

## 2021-02-09 PROCEDURE — 83880 ASSAY OF NATRIURETIC PEPTIDE: CPT | Performed by: NURSE PRACTITIONER

## 2021-02-09 PROCEDURE — 80048 BASIC METABOLIC PNL TOTAL CA: CPT | Performed by: NURSE PRACTITIONER

## 2021-02-09 PROCEDURE — 99214 OFFICE O/P EST MOD 30 MIN: CPT | Performed by: NURSE PRACTITIONER

## 2021-02-09 PROCEDURE — 36415 COLL VENOUS BLD VENIPUNCTURE: CPT | Performed by: NURSE PRACTITIONER

## 2021-02-09 RX ORDER — ISOSORBIDE MONONITRATE 30 MG/1
30 TABLET, EXTENDED RELEASE ORAL DAILY
Qty: 90 TABLET | Refills: 1 | Status: SHIPPED | OUTPATIENT
Start: 2021-02-09 | End: 2021-08-10

## 2021-02-09 RX ORDER — FUROSEMIDE 40 MG
40 TABLET ORAL DAILY
Qty: 90 TABLET | Refills: 1 | Status: ON HOLD | OUTPATIENT
Start: 2021-02-09 | End: 2021-04-10

## 2021-02-09 NOTE — LETTER
2021    Physician No Ref-Primary  No address on file    RE: Jayro Elder       Dear Colleague,    I had the pleasure of seeing Jayro Elder in the [facility].        CARDIOLOGY CLINIC / C.O.R.E. CLINIC VISIT  (Heart Failure Specialty)  DOS: 21    Jayro Elder  : 1950  MRN: [id number]    Primary Cardiologist: Dr. Tomlin     Reason for Visit: post hospital     HISTORY OF PRESENT ILLNESS:  I had the opportunity to see patient, Jayro Elder, today   In [facility] clinic. In the past followed with Dr Tomlin.      He is a 70-year-old male with a past medical history significant for diabetes, hypothyroidism, hypertension, CVA, untreated sleep apnea, CKD, permanent atrial fibrillation, coronary artery disease, status post PCI with resultant ischemic cardiomyopathy, osteomyelitis, status post lower extremity digit amputation, who was admitted on 2001 for dizziness and fatigue.      The patient previously had been followed by my colleagues, Dr. Tomlin and Gill Doty PA-C.  He was last seen in clinic with CONCEPCION Dasilva, on 2020.  At that time, he had been doing reasonably well, though continued to have intermittent chest discomfort that was thought to be atypical for angina.  It was also mentioned that he has had issues with orthostatic hypotension previously, however, at that time and had been doing reasonably well.  Regarding his prior cardiac history, he has an extensive history of ischemic heart disease.  Prior anterior MI in  with proximal LAD stenting, repeat LAD stenting in , distal LAD stenting in .  Presented again on 2019 with worsening dyspnea on exertion, found to have obstructive disease in the RCA for which he underwent stenting.      Has a history of atypical chest pain, evaluated with serial stress tests.  Last ischemic evaluation was a 2020 with a Lexiscan stress test that showed evidence of  infarction in the mid distal LAD territory, no reversible ischemia present.      He recently presented to the Emergency Department with significant weakness, fatigue.  He also endorsed continued chest pain, which has been present for over a year, described as a discomfort over his chest with radiation to his neck.  It does not seem to be exertional in nature.  In the Emergency Department, he was found to have abnormal orthostatic vital signs, blood pressure supine was 128/80 mmHg, and standing it was 87/72 mmHg.      Treated with IV fluids. He still continues to have chest discomfort; however, it is largely unchanged over the last year.  It has been present for over a year.  Troponin negative x3  during the admission.  TTE 01/04/2001 shows LVEF 30%-35%, no significant change compared to prior, Seen by Dr. Diaz, he decreased furosemide from 40 mg daily to 20 mg daily, and stopped spironolactone.     He returns in follow up today. He states he had to increase the lasix back to 40mg daily. Weight now is stable. Denies chest pain, neck or arm pain. Follow with wound Clinic. He states his shortness of breath is at baseline.       I have reviewed and updated the patient's Past Medical History, Social History, Family History and Medication List.         CURRENT MEDICATIONS:  Current Outpatient Medications   Medication Sig Dispense Refill     apixaban ANTICOAGULANT (ELIQUIS) 5 MG tablet Take 1 tablet (5 mg) by mouth 2 times daily       atorvastatin (LIPITOR) 40 MG tablet Take 1 tablet (40 mg) by mouth every evening 90 tablet 3     Cadexomer Iodine, topical, 0.9% (IODOSORB) 0.9 % GEL gel Apply to ulcer on left foot every other day with gauze dressing. 40 g 0     carvedilol (COREG) 25 MG tablet Take 1 tablet (25 mg) by mouth 2 times daily (with meals) 180 tablet 3     clopidogrel (PLAVIX) 75 MG tablet Take 1 tablet (75 mg) by mouth daily 90 tablet 3     Continuous Blood Gluc  (FREESTYLE CLARITA 2 READER SYSTM) DRAGAN 1  Device daily 1 each 3     Continuous Blood Gluc Sensor (FREESTYLE CLARITA 2 SENSOR SYSTM) MISC 1 Units 4 times daily (before meals and nightly) 1 each 3     furosemide (LASIX) 40 MG tablet Take 0.5 tablets (20 mg) by mouth daily (Patient taking differently: Take 40 mg by mouth daily ) 90 tablet 0     gabapentin (NEURONTIN) 300 MG capsule Take 300 mg by mouth At Bedtime       gabapentin (NEURONTIN) 300 MG capsule Take 300 mg by mouth nightly as needed In addition to scheduled dose.       insulin aspart (NOVOLOG FLEXPEN) 100 UNIT/ML pen Inject Subcutaneous 3 times daily (with meals) Sliding Scale  Do not give sliding scale insulin if Pre-Meal BG less than 140.   For Pre-Meal:   - 200 give 2 units.    - 250 give 4 units.    - 300 give 6 units.    - 350 give 8 units.    - 400 give 10 units.       insulin aspart (NOVOLOG PEN) 100 UNIT/ML pen Inject 12 Units Subcutaneous 2 times daily (with meals) With breakfast and lunch       insulin aspart (NOVOLOG PEN) 100 UNIT/ML pen Inject 14 Units Subcutaneous daily (with dinner)       insulin glargine (LANTUS PEN) 100 UNIT/ML pen Inject 43-45 Units Subcutaneous every morning       insulin pen needle (31G X 6 MM) 31G X 6 MM miscellaneous Use  as directed. 100 each 11     isosorbide mononitrate (IMDUR) 30 MG 24 hr tablet Take 1 tablet (30 mg) by mouth daily 90 tablet 0     levothyroxine (SYNTHROID, LEVOTHROID) 137 MCG tablet Take 137 mcg by mouth every evening  90 tablet 3     order for DME Equipment being ordered: size 12 surgical shoe 1 Device 0     pantoprazole (PROTONIX) 40 MG EC tablet Take 1 tablet (40 mg) by mouth every morning (before breakfast) 90 tablet 1     nitroglycerin (NITROSTAT) 0.4 MG sublingual tablet Place 1 tablet (0.4 mg) under the tongue every 5 minutes as needed for chest pain (Patient not taking: Reported on 2/9/2021) 50 tablet 0     order for DME Equipment being ordered: Walker Wheels () and Walker ()  Treatment  Diagnosis: Impaired gait, heel WB bilaterally. (Patient not taking: Reported on 2/9/2021) 1 each 0       ALLERGIES     Allergies   Allergen Reactions     No Known Allergies            ROS:  12-pt ROS is negative except for as noted above.        PHYSICAL EXAMINATION:  BP: 88/60   Constitutional:  Patient is pleasant, alert, cooperative, and in NAD.   HEENT:  NCAT. PERRLA. EOM's intact.   Neck: no JVD  Pulmonary: clear  Cardiac: irregularly irregular , normal S1/S2, no S3/S4, no murmur or rub.   Abdomen:  Soft, non-tender abdomen, no hepatosplenomegaly appreciated.   Extremities:  special boot on left foot   Neurological:  No gross motor or sensory deficits.   Psych: Appropriate affect.      DIAGNOSTIC STUDIES:  Recent Lab Results:    Results for PORTER YANCEY (MRN [id number]) as of 2/9/2021 13:40   Ref. Range 2/9/2021 11:39   Sodium Latest Ref Range: 133 - 144 mmol/L 140   Potassium Latest Ref Range: 3.4 - 5.3 mmol/L 4.1   Chloride Latest Ref Range: 94 - 109 mmol/L 106   Carbon Dioxide Latest Ref Range: 20 - 32 mmol/L 29   Urea Nitrogen Latest Ref Range: 7 - 30 mg/dL 18   Creatinine Latest Ref Range: 0.66 - 1.25 mg/dL 1.14   GFR Estimate Latest Ref Range: >60 mL/min/1.73_m2 65   GFR Estimate If Black Latest Ref Range: >60 mL/min/1.73_m2 75   Calcium Latest Ref Range: 8.5 - 10.1 mg/dL 8.4 (L)   Anion Gap Latest Ref Range: 3 - 14 mmol/L 5   N-Terminal Pro Bnp Latest Ref Range: 0 - 125 pg/mL 1,052 (H)   Glucose Latest Ref Range: 70 - 99 mg/dL 175 (H)       BMP RESULTS:  Lab Results   Component Value Date    CHOL 107 08/13/2020    HDL 45 08/13/2020    LDL 53 08/13/2020    TRIG 44 08/13/2020    CHOLHDLRATIO 2.3 02/02/2015          ASSESMENT /PLAN     1.  Recent orthostatic hypotension.  Orthostatic vital signs were abnormal in the Emergency Department, there has been interval improvement with IV fluid administration. Spironolactone stopped and imdur decreased to 30mg daily.   Furosemide decreased but the patient  indicated he needed to increase it back to 40mg daily   BMP stable    He states he feels better, denies dizziness.   2.  Chest pain.  It has been present for over a year.  Troponin negative x3  during the hospitalization.  TTE 01/04/2001 shows LVEF 30%-35%, no significant change compared to prior.   3.  Permanent atrial fibrillation, on anticoagulation.   4.  Heart failure with reduced ejection fraction secondary to ischemic cardiomyopathy.   - stable   -continue furosemide 40mg daily  5.  Coronary artery disease, extensive history of coronary artery disease with MI in 2005 with proximal LAD stents, repeat LAD stenting in 2009 and in 2013. Stent to the OM in 2017.  Stents to RCA in 2019.  Last ischemic evaluation, Lexiscan stress test 04/2020 for atypical chest pain showed evidence of LAD territory infarction, no reversible ischemia.   6.  Sleep apnea, nonadherent with CPAP therapy.   7.  Former smoking.   8.  Diabetes.      Follow-up:   1. Referral to Dr. King ( patient requested not Dr. Diaz)   2. Requested a PCP - suggested Dr. Sree King    Thank you for the opportunity to be involved in this very pleasant patient's care please feel to contact me with any questions.    Total time: 35 minutes       Nickie PETERS CNP   [facility]      Thank you for allowing me to participate in the care of your patient.      Sincerely,     LORRAINE Galeana CNP [facility]  cc:   LORRAINE Roberts CNP  [address]  [id number]  [place] [address]

## 2021-02-09 NOTE — PATIENT INSTRUCTIONS
Call C.O.RCANDACE. nurse for any questions or concerns Mon-Fri 8am-4pm:                                                [phone]                                For concerns after hours: [phone]    Medication changes: no changes  Plan from today:   1. Consider Dr. Sree King ( [facility]) primary doctor  2. See Dr. Lorenzo King in 3 months ( cardiology)

## 2021-02-09 NOTE — PROGRESS NOTES
CARDIOLOGY CLINIC / C.O.R.E. CLINIC VISIT  (Heart Failure Specialty)  DOS: 1.09.21    Jayro Elder  : 1950  MRN: [id number]    Primary Cardiologist: Dr. Tomlin     Reason for Visit: post hospital     HISTORY OF PRESENT ILLNESS:  I had the opportunity to see patient, Jayro Elder, today   In [facility]. In the past followed with Dr Tomlin.      He is a 70-year-old male with a past medical history significant for diabetes, hypothyroidism, hypertension, CVA, untreated sleep apnea, CKD, permanent atrial fibrillation, coronary artery disease, status post PCI with resultant ischemic cardiomyopathy, osteomyelitis, status post lower extremity digit amputation, who was admitted on 2001 for dizziness and fatigue.      The patient previously had been followed by my colleagues, Dr. Tomlin and Gill Doty PA-C.  He was last seen in clinic with CONCEPCION Dasilva, on 2020.  At that time, he had been doing reasonably well, though continued to have intermittent chest discomfort that was thought to be atypical for angina.  It was also mentioned that he has had issues with orthostatic hypotension previously, however, at that time and had been doing reasonably well.  Regarding his prior cardiac history, he has an extensive history of ischemic heart disease.  Prior anterior MI in  with proximal LAD stenting, repeat LAD stenting in , distal LAD stenting in .  Presented again on 2019 with worsening dyspnea on exertion, found to have obstructive disease in the RCA for which he underwent stenting.      Has a history of atypical chest pain, evaluated with serial stress tests.  Last ischemic evaluation was a 2020 with a Lexiscan stress test that showed evidence of infarction in the mid distal LAD territory, no reversible ischemia present.      He recently presented to the Emergency Department with significant weakness, fatigue.  He also endorsed continued chest pain, which has been present  for over a year, described as a discomfort over his chest with radiation to his neck.  It does not seem to be exertional in nature.  In the Emergency Department, he was found to have abnormal orthostatic vital signs, blood pressure supine was 128/80 mmHg, and standing it was 87/72 mmHg.      Treated with IV fluids. He still continues to have chest discomfort; however, it is largely unchanged over the last year.  It has been present for over a year.  Troponin negative x3  during the admission.  TTE 01/04/2001 shows LVEF 30%-35%, no significant change compared to prior, Seen by Dr. Diaz, he decreased furosemide from 40 mg daily to 20 mg daily, and stopped spironolactone.     He returns in follow up today. He states he had to increase the lasix back to 40mg daily. Weight now is stable. Denies chest pain, neck or arm pain. Follow with wound Clinic. He states his shortness of breath is at baseline.       I have reviewed and updated the patient's Past Medical History, Social History, Family History and Medication List.         CURRENT MEDICATIONS:  Current Outpatient Medications   Medication Sig Dispense Refill     apixaban ANTICOAGULANT (ELIQUIS) 5 MG tablet Take 1 tablet (5 mg) by mouth 2 times daily       atorvastatin (LIPITOR) 40 MG tablet Take 1 tablet (40 mg) by mouth every evening 90 tablet 3     Cadexomer Iodine, topical, 0.9% (IODOSORB) 0.9 % GEL gel Apply to ulcer on left foot every other day with gauze dressing. 40 g 0     carvedilol (COREG) 25 MG tablet Take 1 tablet (25 mg) by mouth 2 times daily (with meals) 180 tablet 3     clopidogrel (PLAVIX) 75 MG tablet Take 1 tablet (75 mg) by mouth daily 90 tablet 3     Continuous Blood Gluc  (FREESTYLE CLARITA 2 READER SYSTM) DRAGAN 1 Device daily 1 each 3     Continuous Blood Gluc Sensor (FREESTYLE CLARITA 2 SENSOR SYSTM) MISC 1 Units 4 times daily (before meals and nightly) 1 each 3     furosemide (LASIX) 40 MG tablet Take 0.5 tablets (20 mg) by mouth daily  (Patient taking differently: Take 40 mg by mouth daily ) 90 tablet 0     gabapentin (NEURONTIN) 300 MG capsule Take 300 mg by mouth At Bedtime       gabapentin (NEURONTIN) 300 MG capsule Take 300 mg by mouth nightly as needed In addition to scheduled dose.       insulin aspart (NOVOLOG FLEXPEN) 100 UNIT/ML pen Inject Subcutaneous 3 times daily (with meals) Sliding Scale  Do not give sliding scale insulin if Pre-Meal BG less than 140.   For Pre-Meal:   - 200 give 2 units.    - 250 give 4 units.    - 300 give 6 units.    - 350 give 8 units.    - 400 give 10 units.       insulin aspart (NOVOLOG PEN) 100 UNIT/ML pen Inject 12 Units Subcutaneous 2 times daily (with meals) With breakfast and lunch       insulin aspart (NOVOLOG PEN) 100 UNIT/ML pen Inject 14 Units Subcutaneous daily (with dinner)       insulin glargine (LANTUS PEN) 100 UNIT/ML pen Inject 43-45 Units Subcutaneous every morning       insulin pen needle (31G X 6 MM) 31G X 6 MM miscellaneous Use  as directed. 100 each 11     isosorbide mononitrate (IMDUR) 30 MG 24 hr tablet Take 1 tablet (30 mg) by mouth daily 90 tablet 0     levothyroxine (SYNTHROID, LEVOTHROID) 137 MCG tablet Take 137 mcg by mouth every evening  90 tablet 3     order for DME Equipment being ordered: size 12 surgical shoe 1 Device 0     pantoprazole (PROTONIX) 40 MG EC tablet Take 1 tablet (40 mg) by mouth every morning (before breakfast) 90 tablet 1     nitroglycerin (NITROSTAT) 0.4 MG sublingual tablet Place 1 tablet (0.4 mg) under the tongue every 5 minutes as needed for chest pain (Patient not taking: Reported on 2/9/2021) 50 tablet 0     order for DME Equipment being ordered: Walker Wheels () and Walker ()  Treatment Diagnosis: Impaired gait, heel WB bilaterally. (Patient not taking: Reported on 2/9/2021) 1 each 0       ALLERGIES     Allergies   Allergen Reactions     No Known Allergies            ROS:  12-pt ROS is negative except for as noted  above.        PHYSICAL EXAMINATION:  BP: 88/60   Constitutional:  Patient is pleasant, alert, cooperative, and in NAD.   HEENT:  NCAT. PERRLA. EOM's intact.   Neck: no JVD  Pulmonary: clear  Cardiac: irregularly irregular , normal S1/S2, no S3/S4, no murmur or rub.   Abdomen:  Soft, non-tender abdomen, no hepatosplenomegaly appreciated.   Extremities:  special boot on left foot   Neurological:  No gross motor or sensory deficits.   Psych: Appropriate affect.      DIAGNOSTIC STUDIES:  Recent Lab Results:    Results for PORTER YANCEY (MRN [id number]) as of 2/9/2021 13:40   Ref. Range 2/9/2021 11:39   Sodium Latest Ref Range: 133 - 144 mmol/L 140   Potassium Latest Ref Range: 3.4 - 5.3 mmol/L 4.1   Chloride Latest Ref Range: 94 - 109 mmol/L 106   Carbon Dioxide Latest Ref Range: 20 - 32 mmol/L 29   Urea Nitrogen Latest Ref Range: 7 - 30 mg/dL 18   Creatinine Latest Ref Range: 0.66 - 1.25 mg/dL 1.14   GFR Estimate Latest Ref Range: >60 mL/min/1.73_m2 65   GFR Estimate If Black Latest Ref Range: >60 mL/min/1.73_m2 75   Calcium Latest Ref Range: 8.5 - 10.1 mg/dL 8.4 (L)   Anion Gap Latest Ref Range: 3 - 14 mmol/L 5   N-Terminal Pro Bnp Latest Ref Range: 0 - 125 pg/mL 1,052 (H)   Glucose Latest Ref Range: 70 - 99 mg/dL 175 (H)       BMP RESULTS:  Lab Results   Component Value Date    CHOL 107 08/13/2020    HDL 45 08/13/2020    LDL 53 08/13/2020    TRIG 44 08/13/2020    CHOLHDLRATIO 2.3 02/02/2015          ASSESMENT /PLAN     1.  Recent orthostatic hypotension.  Orthostatic vital signs were abnormal in the Emergency Department, there has been interval improvement with IV fluid administration. Spironolactone stopped and imdur decreased to 30mg daily.   Furosemide decreased but the patient indicated he needed to increase it back to 40mg daily   BMP stable    He states he feels better, denies dizziness.   2.  Chest pain.  It has been present for over a year.  Troponin negative x3  during the hospitalization.  TTE  01/04/2001 shows LVEF 30%-35%, no significant change compared to prior.   3.  Permanent atrial fibrillation, on anticoagulation.   4.  Heart failure with reduced ejection fraction secondary to ischemic cardiomyopathy.   - stable   -continue furosemide 40mg daily  5.  Coronary artery disease, extensive history of coronary artery disease with MI in 2005 with proximal LAD stents, repeat LAD stenting in 2009 and in 2013. Stent to the OM in 2017.  Stents to RCA in 2019.  Last ischemic evaluation, Lexiscan stress test 04/2020 for atypical chest pain showed evidence of LAD territory infarction, no reversible ischemia.   6.  Sleep apnea, nonadherent with CPAP therapy.   7.  Former smoking.   8.  Diabetes.      Follow-up:   1. Referral to Dr. King ( patient requested not Dr. Diaz)   2. Requested a PCP - suggested Dr. Sree King    Thank you for the opportunity to be involved in this very pleasant patient's care please feel to contact me with any questions.    Total time: 35 minutes       Nickie PETERS CNP   [facility]

## 2021-02-17 ENCOUNTER — OFFICE VISIT (OUTPATIENT)
Dept: ORTHOPEDICS | Facility: CLINIC | Age: 71
End: 2021-02-17
Payer: COMMERCIAL

## 2021-02-17 ENCOUNTER — ANCILLARY PROCEDURE (OUTPATIENT)
Dept: GENERAL RADIOLOGY | Facility: CLINIC | Age: 71
End: 2021-02-17
Attending: STUDENT IN AN ORGANIZED HEALTH CARE EDUCATION/TRAINING PROGRAM
Payer: COMMERCIAL

## 2021-02-17 VITALS
SYSTOLIC BLOOD PRESSURE: 130 MMHG | DIASTOLIC BLOOD PRESSURE: 80 MMHG | BODY MASS INDEX: 31.54 KG/M2 | WEIGHT: 259 LBS | HEIGHT: 76 IN

## 2021-02-17 DIAGNOSIS — G56.23 ULNAR NEUROPATHY OF BOTH UPPER EXTREMITIES: Primary | ICD-10-CM

## 2021-02-17 DIAGNOSIS — M54.10 RADICULOPATHY AFFECTING UPPER EXTREMITY: ICD-10-CM

## 2021-02-17 PROCEDURE — 72040 X-RAY EXAM NECK SPINE 2-3 VW: CPT | Performed by: RADIOLOGY

## 2021-02-17 PROCEDURE — 99203 OFFICE O/P NEW LOW 30 MIN: CPT | Performed by: STUDENT IN AN ORGANIZED HEALTH CARE EDUCATION/TRAINING PROGRAM

## 2021-02-17 ASSESSMENT — MIFFLIN-ST. JEOR: SCORE: 2036.32

## 2021-02-17 NOTE — PATIENT INSTRUCTIONS
1. Radiculopathy    2. Radiculopathy affecting upper extremity      EMG ordered through [facility] [phone]    Follow up with Dr. Avalos or Dr. Mendez after completing the EMG  Scheduling [phone]    Call my office with any questions or concerns, [phone].

## 2021-02-17 NOTE — PROGRESS NOTES
Hand Surgery History & Physical    REFERRING PHYSICIAN: No ref. provider found   PRIMARY CARE PHYSICIAN: No Ref-Primary, Physician           Chief Complaint:   No chief complaint on file.      History of Present Illness:  Symptom Profile Including: location of symptoms, onset, severity, exacerbating/alleviating factors, previous treatments:        Jayro Elder is a 70 year old male who presents for evaluation of diffuse numbness of the right hand along with weakness in pinch and  strength.  He does not get any nocturnal symptoms.  He has had diffuse, constant hand numbness since December 2020.  He notices that he is losing muscle in his right hand.    He also states that he has a diagnosis of peripheral neuropathy.  He has had chronic neck pain as well as headaches at the base of the skull.  He also has difficulty with balance.  He also states that he has had strokes in the past, has had 10 stents and is on Eliquis and Plavix.           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     Cardiomyopathy (H)      Cellulitis of left lower extremity 3/13/2018     Cerebral infarction (H)     eye sight decreased in peripheral of right side and blurry     Diabetic ulcer of left midfoot associated with type 2 diabetes mellitus, with fat layer exposed (H) 3/13/2018     Essential hypertension, benign 11/13/2002     Generalized osteoarthrosis, unspecified site 11/13/2002     Microalbuminuria 3/13/2018    X1     Myocardial infarction (H)      Neuropathy      Sepsis (H)      Sleep apnea     doesn't use it     Substance abuse (H)      Syncope, unspecified syncope type 3/13/2018     Thyroid disease     takes medicine     Tobacco use disorder 11/13/2002     Type 2 diabetes mellitus with diabetic peripheral angiopathy without gangrene, unspecified long term insulin  use status 2005            Past Surgical History:     Past Surgical History:   Procedure Laterality Date     AMPUTATE TOE(S) Right 1/6/2019    Procedure: Right open second toe partial amputation;  Surgeon: Sabino Garcia DPM;  Location: [facility] OR     AMPUTATE TOE(S) Right 1/8/2019    Procedure: 1.  Revision right second toe amputation with resection of distal half of proximal phalanx with full closure.    2.  Debridement of callus/preulcerative lesion, distal left second toe and plantar left first metatarsophalangeal joint.;  Surgeon: Ryan Bhagat DPM;  Location: [facility] OR     AMPUTATE TOE(S) Right 3/12/2019    Procedure: Partial right fourth toe amputation.;  Surgeon: Ryan Bhagat DPM;  Location: [facility] OR     AMPUTATE TOE(S) Right 5/6/2019    Procedure: Right partial third toe amputation;  Surgeon: Татьяна Mckeon DPM, Podiatry/Foot and Ankle Surgery;  Location: [facility] OR     AMPUTATE TOE(S) Left 4/18/2020    Procedure: LEFT SECOND TOE AMPUTATION;  Surgeon: Ryan Bhagat DPM;  Location: [facility] OR     ANGIOGRAM  6/29/05    subtotal occ.mid LAD,SUSAN mid LAD     ANGIOGRAM  7/05    mild CAD,patent stent,no flow-limiting lesions,sev.LV dysf.LAD enlarged     ANGIOGRAM  2/09    Sev.single vessel disease,Mod LV dysf.distal LAD 90%,70-75% mid lad just before prev stent,SUSAN to prox.mid LAD, endeavor to distal LAD     ANGIOGRAM  11/13/13    restenosis, stent LAD     BACK SURGERY      back surgery x3     CV CORONARY ANGIOGRAM N/A 7/8/2019    Procedure: Coronary Angiogram;  Surgeon: Jose Alfredo Crockett MD;  Location:  HEART CARDIAC CATH LAB     CV LEFT HEART CATH N/A 7/8/2019    Procedure: Left Heart Cath;  Surgeon: Jose Alfredo Crockett MD;  Location:  HEART CARDIAC CATH LAB     CV PCI ASPIRATION THROMECTOMY N/A 7/8/2019    Procedure: PCI Aspiration Thrombectomy;  Surgeon: Jose Alfredo Crockett MD;  Location:  HEART CARDIAC CATH LAB     CV PCI STENT DRUG ELUTING N/A 7/8/2019    Procedure: PCI Stent Drug Eluting;   Surgeon: Jose Alfredo Crockett MD;  Location:  HEART CARDIAC CATH LAB     HEART CATH LEFT HEART CATH  06/13/2017    SUSAN to OM3     INCISION AND DRAINAGE TOE, COMBINED Bilateral 9/11/2018    Procedure: COMBINED INCISION AND DRAINAGE TOE;  1) Irrigation and debridement ulcer left great toe  2) Amputation 3rd toe right foot at distal interphalangeal joint level;  Surgeon: Miki Harp MD;  Location: [facility] OR     IRRIGATION AND DEBRIDEMENT FOOT, COMBINED Left 3/12/2019    Procedure: Debridement of left foot ulcer sub first MPJ 2 x 2.5 cm down to and including subcutaneous tissue, callus debridement for preulcerative lesion, left second toe.;  Surgeon: Ryan Bhagat DPM;  Location:  OR     ORTHOPEDIC SURGERY      bunion surgery both feet     ORTHOPEDIC SURGERY      left shoulder     SOFT TISSUE SURGERY      debridement of toe numerous time     VASCULAR SURGERY      7 stents in heart     [facility] NONSPECIFIC PROCEDURE      Laminectomy x 3 - (1983 x 2 & 1990)     [facility] NONSPECIFIC PROCEDURE Bilateral 1998    Bunionectomy     [facility] NONSPECIFIC PROCEDURE  1959    Gingival surgery at age 9            Social History:     Social History     Tobacco Use     Smoking status: Former Smoker     Packs/day: 1.00     Years: 25.00     Pack years: 25.00     Types: Cigarettes     Start date: 1980     Quit date: 7/25/2005     Years since quitting: 15.5     Smokeless tobacco: Never Used   Substance Use Topics     Alcohol use: Yes     Alcohol/week: 4.0 standard drinks     Types: 4 Shots of liquor per week     Frequency: Never     Comment: OCCASSIONALLY             Family History:     Family History   Problem Relation Age of Onset     Cancer - colorectal Sister      Thyroid Disease Brother      Cardiovascular Brother      Diabetes Brother      Cancer Father         tumor in chest and throat     Arthritis Mother      Thyroid Disease Mother      Diabetes Mother      Glaucoma No family hx of      Macular Degeneration No family hx of              Allergies:     Allergies   Allergen Reactions     No Known Allergies             Medications:     Current Outpatient Medications   Medication     apixaban ANTICOAGULANT (ELIQUIS) 5 MG tablet     atorvastatin (LIPITOR) 40 MG tablet     Cadexomer Iodine, topical, 0.9% (IODOSORB) 0.9 % GEL gel     carvedilol (COREG) 25 MG tablet     clopidogrel (PLAVIX) 75 MG tablet     Continuous Blood Gluc  (FREESTYLE CLARITA 2 READER SYSTM) DRAGAN     Continuous Blood Gluc Sensor (FREESTYLE CLARITA 2 SENSOR SYSTM) MISC     furosemide (LASIX) 40 MG tablet     gabapentin (NEURONTIN) 300 MG capsule     gabapentin (NEURONTIN) 300 MG capsule     insulin aspart (NOVOLOG FLEXPEN) 100 UNIT/ML pen     insulin aspart (NOVOLOG PEN) 100 UNIT/ML pen     insulin aspart (NOVOLOG PEN) 100 UNIT/ML pen     insulin glargine (LANTUS PEN) 100 UNIT/ML pen     insulin pen needle (31G X 6 MM) 31G X 6 MM miscellaneous     isosorbide mononitrate (IMDUR) 30 MG 24 hr tablet     levothyroxine (SYNTHROID, LEVOTHROID) 137 MCG tablet     nitroglycerin (NITROSTAT) 0.4 MG sublingual tablet     order for DME     order for DME     pantoprazole (PROTONIX) 40 MG EC tablet     No current facility-administered medications for this visit.              Review of Systems:     A 10 point ROS was performed and reviewed. Specific responses to these questions are noted at the end of the document.         Physical Exam:   Vitals: There were no vitals taken for this visit.    Physical Exam Adult:  General: Well-nourished, alert, cooperative with exam and in no acute distress  HEENT: Atraumatic, normocephalic, extraocular movements intact, moist mucous membranes, trachea midline  Pulmonary: Unlabored work of breathing, on room air  Cardiovascular: Warm and well-perfused extremities. 2+ radial pulses  Skin: Warm, intact without rashes to the upper extremities, head or neck   Gait: Normal  Neuro/psych: Oriented to time, place and person. Affect is  appropriate    Musculoskeletal: A focused physical examination of the bilateral upper extremities reveals:   Inspection-bilateral, first dorsal interosseous atrophy.  Palpation- non tender to palpation throughout the hands.  Neurovascular- intact light touch sensation and motor to distribution of the median, ulnar and radial nerves. Brisk capillary refill to the distal fingers. 2+ radial pulse.   Range of Motion: Full and symmetric. Able to make a full fist.  Stability: no dislocation, subluxation or laxity  Muscle strength and tone: No atrophy, spasticity or flaccidness.  5/5 strength EPL, FPL, APB, and 3/5 right  first dorsal interosseous, 4/5 left first dorsal interosseous.    Special Tests:    Negative Spurling's  Negative Lhermitte's  Negative Kayy's  Significant imbalance with assessment of tandem gait.    Median nerve  Thenar atrophy: None  Carpal tunnel compression test: Does not worsen constant numbness.  Phalen's test: Does not worsen constant numbness.  Tinel's test: Negative bilaterally    Ulnar nerve  First dorsal interosseous atrophy: Presence of atrophy bilaterally  Froment's sign: Positive bilaterally  Cubital tunnel compression with elbow flexion: Does not worsen constant numbness  Tinel's at the elbow: Negative bilaterally  Nerve subluxation with elbow ROM: None appreciated.                 Imaging:   3 view X-ray of the cervical spine was independently interpreted by me. The results were discussed with the patient.  Findings include:    Slight loss of normal cervical lordosis.  Cervical spine arthritis at the C4-C7 levels, most pronounced at C5-C6.      Other Diagnostic Tests:      Hemoglobin A1c 9.8             Assessment and Plan:   Assessment:  70 year old male with poorly controlled, insulin-dependent type 2 diabetes, prior strokes with evidence of bilateral ulnar neuropathy likely secondary to ulnar nerve compression at the elbow however cervical radiculopathy as well as peripheral  neuropathy are also in the differential.    Cervical myelopathy is in the differential however physical exam is not completely consistent with this diagnosis, however the patient does have gait imbalance and significant C-spine arthritis.       Plan:  1. Due to the bilateral nature of his first dorsal interosseous atrophy as well as known diagnosis of poorly controlled type 2 diabetes and peripheral neuropathy and cervical spine arthritis, I recommend further work-up with EMG/NCS to evaluate for peripheral neuropathy, ulnar neuropathy at the elbow, cervical radiculopathy.  2. Follow-up with either Dr. Vidal or Dr. Mendez at the [facility] after the EMG/NCS has been obtained to discuss treatment going forward.       Katarzyna Vences MD  Department of Orthopedic Surgery  Hand Surgery

## 2021-02-17 NOTE — LETTER
2/17/2021         RE: Jayro Elder  [id number] [facility] [id number]        Dear Colleague,    Thank you for referring your patient, Jayro Elder, to the [facility] [place]. Please see a copy of my visit note below.    Hand Surgery History & Physical    REFERRING PHYSICIAN: No ref. provider found   PRIMARY CARE PHYSICIAN: No Ref-Primary, Physician           Chief Complaint:   No chief complaint on file.      History of Present Illness:  Symptom Profile Including: location of symptoms, onset, severity, exacerbating/alleviating factors, previous treatments:        Jayro Elder is a 70 year old male who presents for evaluation of diffuse numbness of the right hand along with weakness in pinch and  strength.  He does not get any nocturnal symptoms.  He has had diffuse, constant hand numbness since December 2020.  He notices that he is losing muscle in his right hand.    He also states that he has a diagnosis of peripheral neuropathy.  He has had chronic neck pain as well as headaches at the base of the skull.  He also has difficulty with balance.  He also states that he has had strokes in the past, has had 10 stents and is on Eliquis and Plavix.           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     Cardiomyopathy (H)      Cellulitis of left lower extremity 3/13/2018     Cerebral infarction (H)     eye sight decreased in peripheral of right side and blurry     Diabetic ulcer of left midfoot associated with type 2 diabetes mellitus, with fat layer exposed (H) 3/13/2018     Essential hypertension, benign 11/13/2002     Generalized osteoarthrosis, unspecified site 11/13/2002     Microalbuminuria 3/13/2018    X1     Myocardial infarction (H)      Neuropathy      Sepsis (H)      Sleep apnea      doesn't use it     Substance abuse (H)      Syncope, unspecified syncope type 3/13/2018     Thyroid disease     takes medicine     Tobacco use disorder 11/13/2002     Type 2 diabetes mellitus with diabetic peripheral angiopathy without gangrene, unspecified long term insulin use status 2005            Past Surgical History:     Past Surgical History:   Procedure Laterality Date     AMPUTATE TOE(S) Right 1/6/2019    Procedure: Right open second toe partial amputation;  Surgeon: Sabino Garcia DPM;  Location: [facility] OR     AMPUTATE TOE(S) Right 1/8/2019    Procedure: 1.  Revision right second toe amputation with resection of distal half of proximal phalanx with full closure.    2.  Debridement of callus/preulcerative lesion, distal left second toe and plantar left first metatarsophalangeal joint.;  Surgeon: Ryan Bhagat DPM;  Location: [facility] OR     AMPUTATE TOE(S) Right 3/12/2019    Procedure: Partial right fourth toe amputation.;  Surgeon: Ryan Bhagat DPM;  Location: [facility] OR     AMPUTATE TOE(S) Right 5/6/2019    Procedure: Right partial third toe amputation;  Surgeon: Татьяна Mckeon DPM, Podiatry/Foot and Ankle Surgery;  Location: [facility] OR     AMPUTATE TOE(S) Left 4/18/2020    Procedure: LEFT SECOND TOE AMPUTATION;  Surgeon: Ryan Bhagat DPM;  Location:  OR     ANGIOGRAM  6/29/05    subtotal occ.mid LAD,SUSAN mid LAD     ANGIOGRAM  7/05    mild CAD,patent stent,no flow-limiting lesions,sev.LV dysf.LAD enlarged     ANGIOGRAM  2/09    Sev.single vessel disease,Mod LV dysf.distal LAD 90%,70-75% mid lad just before prev stent,SUSAN to prox.mid LAD, endeavor to distal LAD     ANGIOGRAM  11/13/13    restenosis, stent LAD     BACK SURGERY      back surgery x3     CV CORONARY ANGIOGRAM N/A 7/8/2019    Procedure: Coronary Angiogram;  Surgeon: Jose Alfredo Crockett MD;  Location:  HEART CARDIAC CATH LAB     CV LEFT HEART CATH N/A 7/8/2019    Procedure: Left Heart Cath;  Surgeon: Jose Alfredo Crockett MD;   Location:  HEART CARDIAC CATH LAB     CV PCI ASPIRATION THROMECTOMY N/A 7/8/2019    Procedure: PCI Aspiration Thrombectomy;  Surgeon: Jose Alfredo Crockett MD;  Location:  HEART CARDIAC CATH LAB     CV PCI STENT DRUG ELUTING N/A 7/8/2019    Procedure: PCI Stent Drug Eluting;  Surgeon: Jose Alfredo Crockett MD;  Location:  HEART CARDIAC CATH LAB     HEART CATH LEFT HEART CATH  06/13/2017    SUSAN to OM3     INCISION AND DRAINAGE TOE, COMBINED Bilateral 9/11/2018    Procedure: COMBINED INCISION AND DRAINAGE TOE;  1) Irrigation and debridement ulcer left great toe  2) Amputation 3rd toe right foot at distal interphalangeal joint level;  Surgeon: Miki Harp MD;  Location: [facility] OR     IRRIGATION AND DEBRIDEMENT FOOT, COMBINED Left 3/12/2019    Procedure: Debridement of left foot ulcer sub first MPJ 2 x 2.5 cm down to and including subcutaneous tissue, callus debridement for preulcerative lesion, left second toe.;  Surgeon: Ryan Bhagat DPM;  Location:  OR     ORTHOPEDIC SURGERY      bunion surgery both feet     ORTHOPEDIC SURGERY      left shoulder     SOFT TISSUE SURGERY      debridement of toe numerous time     VASCULAR SURGERY      7 stents in heart     [facility] NONSPECIFIC PROCEDURE      Laminectomy x 3 - (1983 x 2 & 1990)     [facility] NONSPECIFIC PROCEDURE Bilateral 1998    Bunionectomy     [facility] NONSPECIFIC PROCEDURE  1959    Gingival surgery at age 9            Social History:     Social History     Tobacco Use     Smoking status: Former Smoker     Packs/day: 1.00     Years: 25.00     Pack years: 25.00     Types: Cigarettes     Start date: 1980     Quit date: 7/25/2005     Years since quitting: 15.5     Smokeless tobacco: Never Used   Substance Use Topics     Alcohol use: Yes     Alcohol/week: 4.0 standard drinks     Types: 4 Shots of liquor per week     Frequency: Never     Comment: OCCASSIONALLY             Family History:     Family History   Problem Relation Age of Onset     Cancer - colorectal Sister       Thyroid Disease Brother      Cardiovascular Brother      Diabetes Brother      Cancer Father         tumor in chest and throat     Arthritis Mother      Thyroid Disease Mother      Diabetes Mother      Glaucoma No family hx of      Macular Degeneration No family hx of             Allergies:     Allergies   Allergen Reactions     No Known Allergies             Medications:     Current Outpatient Medications   Medication     apixaban ANTICOAGULANT (ELIQUIS) 5 MG tablet     atorvastatin (LIPITOR) 40 MG tablet     Cadexomer Iodine, topical, 0.9% (IODOSORB) 0.9 % GEL gel     carvedilol (COREG) 25 MG tablet     clopidogrel (PLAVIX) 75 MG tablet     Continuous Blood Gluc  (FREESTYLE CLARITA 2 READER SYSTM) DRAGAN     Continuous Blood Gluc Sensor (FREESTYLE CLARITA 2 SENSOR SYSTM) MISC     furosemide (LASIX) 40 MG tablet     gabapentin (NEURONTIN) 300 MG capsule     gabapentin (NEURONTIN) 300 MG capsule     insulin aspart (NOVOLOG FLEXPEN) 100 UNIT/ML pen     insulin aspart (NOVOLOG PEN) 100 UNIT/ML pen     insulin aspart (NOVOLOG PEN) 100 UNIT/ML pen     insulin glargine (LANTUS PEN) 100 UNIT/ML pen     insulin pen needle (31G X 6 MM) 31G X 6 MM miscellaneous     isosorbide mononitrate (IMDUR) 30 MG 24 hr tablet     levothyroxine (SYNTHROID, LEVOTHROID) 137 MCG tablet     nitroglycerin (NITROSTAT) 0.4 MG sublingual tablet     order for DME     order for DME     pantoprazole (PROTONIX) 40 MG EC tablet     No current facility-administered medications for this visit.              Review of Systems:     A 10 point ROS was performed and reviewed. Specific responses to these questions are noted at the end of the document.         Physical Exam:   Vitals: There were no vitals taken for this visit.    Physical Exam Adult:  General: Well-nourished, alert, cooperative with exam and in no acute distress  HEENT: Atraumatic, normocephalic, extraocular movements intact, moist mucous membranes, trachea midline  Pulmonary: Unlabored  work of breathing, on room air  Cardiovascular: Warm and well-perfused extremities. 2+ radial pulses  Skin: Warm, intact without rashes to the upper extremities, head or neck   Gait: Normal  Neuro/psych: Oriented to time, place and person. Affect is appropriate    Musculoskeletal: A focused physical examination of the bilateral upper extremities reveals:   Inspection-bilateral, first dorsal interosseous atrophy.  Palpation- non tender to palpation throughout the hands.  Neurovascular- intact light touch sensation and motor to distribution of the median, ulnar and radial nerves. Brisk capillary refill to the distal fingers. 2+ radial pulse.   Range of Motion: Full and symmetric. Able to make a full fist.  Stability: no dislocation, subluxation or laxity  Muscle strength and tone: No atrophy, spasticity or flaccidness.  5/5 strength EPL, FPL, APB, and 3/5 right  first dorsal interosseous, 4/5 left first dorsal interosseous.    Special Tests:    Negative Spurling's  Negative Lhermitte's  Negative Kayy's  Significant imbalance with assessment of tandem gait.    Median nerve  Thenar atrophy: None  Carpal tunnel compression test: Does not worsen constant numbness.  Phalen's test: Does not worsen constant numbness.  Tinel's test: Negative bilaterally    Ulnar nerve  First dorsal interosseous atrophy: Presence of atrophy bilaterally  Froment's sign: Positive bilaterally  Cubital tunnel compression with elbow flexion: Does not worsen constant numbness  Tinel's at the elbow: Negative bilaterally  Nerve subluxation with elbow ROM: None appreciated.                 Imaging:   3 view X-ray of the cervical spine was independently interpreted by me. The results were discussed with the patient.  Findings include:    Slight loss of normal cervical lordosis.  Cervical spine arthritis at the C4-C7 levels, most pronounced at C5-C6.      Other Diagnostic Tests:      Hemoglobin A1c 9.8             Assessment and Plan:    Assessment:  70 year old male with poorly controlled, insulin-dependent type 2 diabetes, prior strokes with evidence of bilateral ulnar neuropathy likely secondary to ulnar nerve compression at the elbow however cervical radiculopathy as well as peripheral neuropathy are also in the differential.    Cervical myelopathy is in the differential however physical exam is not completely consistent with this diagnosis, however the patient does have gait imbalance and significant C-spine arthritis.       Plan:  1. Due to the bilateral nature of his first dorsal interosseous atrophy as well as known diagnosis of poorly controlled type 2 diabetes and peripheral neuropathy and cervical spine arthritis, I recommend further work-up with EMG/NCS to evaluate for peripheral neuropathy, ulnar neuropathy at the elbow, cervical radiculopathy.  2. Follow-up with either Dr. Vidal or Dr. Mendez at the [facility] after the EMG/NCS has been obtained to discuss treatment going forward.       Katarzyna Vences MD  Department of Orthopedic Surgery  Hand Surgery              Again, thank you for allowing me to participate in the care of your patient.        Sincerely,        Katarzyna Vences MD

## 2021-03-09 ENCOUNTER — IMMUNIZATION (OUTPATIENT)
Dept: NURSING | Facility: CLINIC | Age: 71
End: 2021-03-09
Payer: COMMERCIAL

## 2021-03-09 DIAGNOSIS — K21.9 GASTROESOPHAGEAL REFLUX DISEASE: ICD-10-CM

## 2021-03-09 PROCEDURE — 0011A PR COVID VAC MODERNA 100 MCG/0.5 ML IM: CPT

## 2021-03-09 PROCEDURE — 91301 PR COVID VAC MODERNA 100 MCG/0.5 ML IM: CPT

## 2021-03-09 RX ORDER — PANTOPRAZOLE SODIUM 40 MG/1
TABLET, DELAYED RELEASE ORAL
Qty: 90 TABLET | Refills: 1 | Status: SHIPPED | OUTPATIENT
Start: 2021-03-09 | End: 2021-09-21

## 2021-03-20 ENCOUNTER — HEALTH MAINTENANCE LETTER (OUTPATIENT)
Age: 71
End: 2021-03-20

## 2021-04-06 ENCOUNTER — IMMUNIZATION (OUTPATIENT)
Dept: NURSING | Facility: CLINIC | Age: 71
End: 2021-04-06
Attending: INTERNAL MEDICINE
Payer: COMMERCIAL

## 2021-04-06 PROCEDURE — 91301 PR COVID VAC MODERNA 100 MCG/0.5 ML IM: CPT

## 2021-04-06 PROCEDURE — 0012A PR COVID VAC MODERNA 100 MCG/0.5 ML IM: CPT

## 2021-04-07 ENCOUNTER — APPOINTMENT (OUTPATIENT)
Dept: GENERAL RADIOLOGY | Facility: CLINIC | Age: 71
DRG: 286 | End: 2021-04-07
Attending: EMERGENCY MEDICINE
Payer: COMMERCIAL

## 2021-04-07 ENCOUNTER — HOSPITAL ENCOUNTER (INPATIENT)
Facility: CLINIC | Age: 71
LOS: 3 days | Discharge: HOME | DRG: 286 | End: 2021-04-10
Attending: EMERGENCY MEDICINE | Admitting: INTERNAL MEDICINE
Payer: COMMERCIAL

## 2021-04-07 ENCOUNTER — CARE COORDINATION (OUTPATIENT)
Dept: CARDIOLOGY | Facility: CLINIC | Age: 71
End: 2021-04-07

## 2021-04-07 DIAGNOSIS — Z20.822 COVID-19 RULED OUT BY LABORATORY TESTING: ICD-10-CM

## 2021-04-07 DIAGNOSIS — I50.23 ACUTE ON CHRONIC SYSTOLIC CONGESTIVE HEART FAILURE (H): ICD-10-CM

## 2021-04-07 DIAGNOSIS — I25.5 ISCHEMIC CARDIOMYOPATHY: ICD-10-CM

## 2021-04-07 DIAGNOSIS — R07.9 CHEST PAIN, UNSPECIFIED TYPE: ICD-10-CM

## 2021-04-07 DIAGNOSIS — I25.119 CORONARY ARTERY DISEASE INVOLVING NATIVE CORONARY ARTERY OF NATIVE HEART WITH ANGINA PECTORIS (H): Primary | ICD-10-CM

## 2021-04-07 DIAGNOSIS — D50.9 IRON DEFICIENCY ANEMIA, UNSPECIFIED IRON DEFICIENCY ANEMIA TYPE: ICD-10-CM

## 2021-04-07 LAB
ALBUMIN SERPL-MCNC: 3.6 G/DL (ref 3.4–5)
ALBUMIN UR-MCNC: 10 MG/DL
ALP SERPL-CCNC: 108 U/L (ref 40–150)
ALT SERPL W P-5'-P-CCNC: 30 U/L (ref 0–70)
ANION GAP SERPL CALCULATED.3IONS-SCNC: 7 MMOL/L (ref 3–14)
APPEARANCE UR: CLEAR
AST SERPL W P-5'-P-CCNC: 15 U/L (ref 0–45)
BASOPHILS # BLD AUTO: 0 10E9/L (ref 0–0.2)
BASOPHILS NFR BLD AUTO: 0.6 %
BILIRUB SERPL-MCNC: 1.3 MG/DL (ref 0.2–1.3)
BILIRUB UR QL STRIP: NEGATIVE
BUN SERPL-MCNC: 22 MG/DL (ref 7–30)
CALCIUM SERPL-MCNC: 8.9 MG/DL (ref 8.5–10.1)
CHLORIDE SERPL-SCNC: 101 MMOL/L (ref 94–109)
CO2 SERPL-SCNC: 29 MMOL/L (ref 20–32)
COLOR UR AUTO: YELLOW
CREAT SERPL-MCNC: 1.45 MG/DL (ref 0.66–1.25)
DIFFERENTIAL METHOD BLD: ABNORMAL
EOSINOPHIL # BLD AUTO: 0.2 10E9/L (ref 0–0.7)
EOSINOPHIL NFR BLD AUTO: 4.6 %
ERYTHROCYTE [DISTWIDTH] IN BLOOD BY AUTOMATED COUNT: 13.5 % (ref 10–15)
FLUAV RNA RESP QL NAA+PROBE: NEGATIVE
FLUBV RNA RESP QL NAA+PROBE: NEGATIVE
GFR SERPL CREATININE-BSD FRML MDRD: 48 ML/MIN/{1.73_M2}
GLUCOSE SERPL-MCNC: 179 MG/DL (ref 70–99)
GLUCOSE UR STRIP-MCNC: NEGATIVE MG/DL
HCT VFR BLD AUTO: 31.7 % (ref 40–53)
HGB BLD-MCNC: 10.3 G/DL (ref 13.3–17.7)
HGB UR QL STRIP: NEGATIVE
IMM GRANULOCYTES # BLD: 0 10E9/L (ref 0–0.4)
IMM GRANULOCYTES NFR BLD: 0.4 %
KETONES UR STRIP-MCNC: NEGATIVE MG/DL
LABORATORY COMMENT REPORT: NORMAL
LEUKOCYTE ESTERASE UR QL STRIP: NEGATIVE
LYMPHOCYTES # BLD AUTO: 0.7 10E9/L (ref 0.8–5.3)
LYMPHOCYTES NFR BLD AUTO: 13 %
MCH RBC QN AUTO: 29 PG (ref 26.5–33)
MCHC RBC AUTO-ENTMCNC: 32.5 G/DL (ref 31.5–36.5)
MCV RBC AUTO: 89 FL (ref 78–100)
MONOCYTES # BLD AUTO: 0.5 10E9/L (ref 0–1.3)
MONOCYTES NFR BLD AUTO: 9.1 %
MUCOUS THREADS #/AREA URNS LPF: PRESENT /LPF
NEUTROPHILS # BLD AUTO: 3.8 10E9/L (ref 1.6–8.3)
NEUTROPHILS NFR BLD AUTO: 72.3 %
NITRATE UR QL: NEGATIVE
NRBC # BLD AUTO: 0 10*3/UL
NRBC BLD AUTO-RTO: 0 /100
NT-PROBNP SERPL-MCNC: 1292 PG/ML (ref 0–900)
PH UR STRIP: 7 PH (ref 5–7)
PLATELET # BLD AUTO: 203 10E9/L (ref 150–450)
POTASSIUM SERPL-SCNC: 4 MMOL/L (ref 3.4–5.3)
PROT SERPL-MCNC: 6.7 G/DL (ref 6.8–8.8)
RBC # BLD AUTO: 3.55 10E12/L (ref 4.4–5.9)
RBC #/AREA URNS AUTO: <1 /HPF (ref 0–2)
RSV RNA SPEC QL NAA+PROBE: NEGATIVE
SARS-COV-2 RNA RESP QL NAA+PROBE: NEGATIVE
SODIUM SERPL-SCNC: 137 MMOL/L (ref 133–144)
SOURCE: ABNORMAL
SP GR UR STRIP: 1.02 (ref 1–1.03)
SPECIMEN SOURCE: NORMAL
SQUAMOUS #/AREA URNS AUTO: 0 /HPF (ref 0–1)
TROPONIN I SERPL-MCNC: <0.015 UG/L (ref 0–0.04)
TROPONIN I SERPL-MCNC: <0.015 UG/L (ref 0–0.04)
UROBILINOGEN UR STRIP-MCNC: 8 MG/DL (ref 0–2)
WBC # BLD AUTO: 5.3 10E9/L (ref 4–11)
WBC #/AREA URNS AUTO: <1 /HPF (ref 0–5)

## 2021-04-07 PROCEDURE — 120N000003 HC R&B IMCU UMMC

## 2021-04-07 PROCEDURE — 80053 COMPREHEN METABOLIC PANEL: CPT | Performed by: EMERGENCY MEDICINE

## 2021-04-07 PROCEDURE — 85025 COMPLETE CBC W/AUTO DIFF WBC: CPT | Performed by: EMERGENCY MEDICINE

## 2021-04-07 PROCEDURE — 93010 ELECTROCARDIOGRAM REPORT: CPT | Performed by: EMERGENCY MEDICINE

## 2021-04-07 PROCEDURE — 99285 EMERGENCY DEPT VISIT HI MDM: CPT | Mod: 25 | Performed by: EMERGENCY MEDICINE

## 2021-04-07 PROCEDURE — 96374 THER/PROPH/DIAG INJ IV PUSH: CPT | Performed by: EMERGENCY MEDICINE

## 2021-04-07 PROCEDURE — 71045 X-RAY EXAM CHEST 1 VIEW: CPT | Mod: 26 | Performed by: RADIOLOGY

## 2021-04-07 PROCEDURE — 84484 ASSAY OF TROPONIN QUANT: CPT | Performed by: EMERGENCY MEDICINE

## 2021-04-07 PROCEDURE — 96376 TX/PRO/DX INJ SAME DRUG ADON: CPT | Performed by: EMERGENCY MEDICINE

## 2021-04-07 PROCEDURE — 250N000011 HC RX IP 250 OP 636: Performed by: EMERGENCY MEDICINE

## 2021-04-07 PROCEDURE — 87636 SARSCOV2 & INF A&B AMP PRB: CPT | Performed by: EMERGENCY MEDICINE

## 2021-04-07 PROCEDURE — 81001 URINALYSIS AUTO W/SCOPE: CPT | Performed by: EMERGENCY MEDICINE

## 2021-04-07 PROCEDURE — 83880 ASSAY OF NATRIURETIC PEPTIDE: CPT | Performed by: EMERGENCY MEDICINE

## 2021-04-07 PROCEDURE — C9803 HOPD COVID-19 SPEC COLLECT: HCPCS | Performed by: EMERGENCY MEDICINE

## 2021-04-07 PROCEDURE — 71045 X-RAY EXAM CHEST 1 VIEW: CPT

## 2021-04-07 RX ORDER — FUROSEMIDE 10 MG/ML
40 INJECTION INTRAMUSCULAR; INTRAVENOUS ONCE
Status: COMPLETED | OUTPATIENT
Start: 2021-04-07 | End: 2021-04-07

## 2021-04-07 RX ORDER — FUROSEMIDE 10 MG/ML
40 INJECTION INTRAMUSCULAR; INTRAVENOUS 2 TIMES DAILY
Status: DISCONTINUED | OUTPATIENT
Start: 2021-04-08 | End: 2021-04-08

## 2021-04-07 RX ADMIN — FUROSEMIDE 40 MG: 10 INJECTION, SOLUTION INTRAVENOUS at 21:33

## 2021-04-07 RX ADMIN — FUROSEMIDE 40 MG: 10 INJECTION, SOLUTION INTRAVENOUS at 22:37

## 2021-04-07 ASSESSMENT — ENCOUNTER SYMPTOMS
ACTIVITY CHANGE: 1
SHORTNESS OF BREATH: 1
LIGHT-HEADEDNESS: 1
NAUSEA: 0
FATIGUE: 1
VOMITING: 0
FEVER: 0

## 2021-04-07 NOTE — PROGRESS NOTES
Received message from  who said the wife called stating pt has SOB and would like to talk to someone and maybe try to get in sooner.     Called wife and she reports Jayro has been having a lot of SOB w/ walking and even at rest.  Unable to sleep d/t breathing and BLE edema.  Does also c/o dizziness.  Feels abdominal bloating is worse.  He is sleeping currently, but she reports he does his own medications.  Today's /77 w/ pulse 72.  Today's wt: 270 lbs.  She is requesting earlier appts d/t his s/s and wt gain.      Current Diuretic: furosemide 40 mg daily      2/9/21: LOV w/ Flavio Lopez CNP where pt reported baseline SOB.  Wt was 259 lbs.  Pt had increased furosemide on own to 40 mg daily from 20 mg daily and was told to continue at 40 mg daily    Hospitalized 1/3 to 1/4/21 w/ orthostatic hypotension and cardiology decreased furosemide to 20 mg daily and decreased Imdur to 30 mg and stopped Spironolactone.  Admission wt: 247 lbs. Discharge wt: Not documented    Appt scheduled w/ soonest [facility]  Future Appointments   Date Time Provider Department Center   4/9/2021  1:00 PM RU LAB LAB [facility]   4/9/2021  2:10 PM Sheela Moreau APRN CNP [facility] CLIN   4/15/2021  7:45 AM Татьяна Mckeon DPM, Podiatry/Foot and Ankle Surgery [facility]   5/17/2021  1:30 PM RU LAB LAB P [facility] CL[facility]   5/17/2021  2:15 PM Livan King MD [facility] PSA CLIN       Will route to Flavio Lopez CNP for further review and recommendations.     Rosio Mack, RN BSN   [place], [place]  C.O.R.E. Clinic Care Coordinator  04/07/21, 12:19 PM

## 2021-04-07 NOTE — Clinical Note
dry, intact, no bleeding and no hematoma. 6 Fr sheath removed from the RRA. TR band placed until hemostasis is obtained. See flowsheet for details.

## 2021-04-07 NOTE — PROGRESS NOTES
Called wife Aixa back and reviewed with her Flavio Lopez CNP recommendations to be evaluated in ED today d/t current situation of large wt gain and sx.  Cancelled 4/9's appt and will continue to monitor chart.  Will call back to reschedule CORE appt post ED or hospitalization.  Aixa voices understanding and denies further questions or concerns at this time.     Future Appointments   Date Time Provider Department Center   4/15/2021  7:45 AM Татьяна Mckeon DPM, Podiatry/Foot and Ankle Surgery [facility] [place]   5[facility]17[facility]2021  [facility]   5/17/2021  2:15 PM Livan King MD [facility] PSA CLIN     Rosio Mack RN BSN   [place], [place]  C.O.R.E. Clinic Care Coordinator  04/07/21, 1:45 PM

## 2021-04-08 ENCOUNTER — APPOINTMENT (OUTPATIENT)
Dept: CARDIOLOGY | Facility: CLINIC | Age: 71
DRG: 286 | End: 2021-04-08
Payer: COMMERCIAL

## 2021-04-08 ENCOUNTER — APPOINTMENT (OUTPATIENT)
Dept: OCCUPATIONAL THERAPY | Facility: CLINIC | Age: 71
DRG: 286 | End: 2021-04-08
Payer: COMMERCIAL

## 2021-04-08 LAB
ALBUMIN SERPL-MCNC: 3.7 G/DL (ref 3.4–5)
ALP SERPL-CCNC: 109 U/L (ref 40–150)
ALT SERPL W P-5'-P-CCNC: 30 U/L (ref 0–70)
ANION GAP SERPL CALCULATED.3IONS-SCNC: 6 MMOL/L (ref 3–14)
AST SERPL W P-5'-P-CCNC: 14 U/L (ref 0–45)
BASOPHILS # BLD AUTO: 0 10E9/L (ref 0–0.2)
BASOPHILS NFR BLD AUTO: 0.2 %
BILIRUB SERPL-MCNC: 1.4 MG/DL (ref 0.2–1.3)
BUN SERPL-MCNC: 22 MG/DL (ref 7–30)
BUN SERPL-MCNC: 24 MG/DL (ref 7–30)
BUN SERPL-MCNC: 24 MG/DL (ref 7–30)
CALCIUM SERPL-MCNC: 8.9 MG/DL (ref 8.5–10.1)
CALCIUM SERPL-MCNC: 8.9 MG/DL (ref 8.5–10.1)
CALCIUM SERPL-MCNC: 9 MG/DL (ref 8.5–10.1)
CHLORIDE SERPL-SCNC: 101 MMOL/L (ref 94–109)
CHLORIDE SERPL-SCNC: 101 MMOL/L (ref 94–109)
CHLORIDE SERPL-SCNC: 102 MMOL/L (ref 94–109)
CHOLEST SERPL-MCNC: 79 MG/DL
CO2 SERPL-SCNC: 28 MMOL/L (ref 20–32)
CO2 SERPL-SCNC: 29 MMOL/L (ref 20–32)
CO2 SERPL-SCNC: 31 MMOL/L (ref 20–32)
CREAT SERPL-MCNC: 1.27 MG/DL (ref 0.66–1.25)
CREAT SERPL-MCNC: 1.27 MG/DL (ref 0.66–1.25)
CREAT SERPL-MCNC: 1.36 MG/DL (ref 0.66–1.25)
DIFFERENTIAL METHOD BLD: ABNORMAL
EOSINOPHIL # BLD AUTO: 0.2 10E9/L (ref 0–0.7)
EOSINOPHIL NFR BLD AUTO: 5.4 %
ERYTHROCYTE [DISTWIDTH] IN BLOOD BY AUTOMATED COUNT: 13.5 % (ref 10–15)
FERRITIN SERPL-MCNC: 116 NG/ML (ref 26–388)
GFR SERPL CREATININE-BSD FRML MDRD: 52 ML/MIN/{1.73_M2}
GFR SERPL CREATININE-BSD FRML MDRD: 57 ML/MIN/{1.73_M2}
GFR SERPL CREATININE-BSD FRML MDRD: 57 ML/MIN/{1.73_M2}
GLUCOSE BLDC GLUCOMTR-MCNC: 110 MG/DL (ref 70–99)
GLUCOSE BLDC GLUCOMTR-MCNC: 131 MG/DL (ref 70–99)
GLUCOSE BLDC GLUCOMTR-MCNC: 141 MG/DL (ref 70–99)
GLUCOSE BLDC GLUCOMTR-MCNC: 179 MG/DL (ref 70–99)
GLUCOSE BLDC GLUCOMTR-MCNC: 238 MG/DL (ref 70–99)
GLUCOSE SERPL-MCNC: 122 MG/DL (ref 70–99)
GLUCOSE SERPL-MCNC: 137 MG/DL (ref 70–99)
GLUCOSE SERPL-MCNC: 144 MG/DL (ref 70–99)
HBA1C MFR BLD: 8.1 % (ref 0–5.6)
HCT VFR BLD AUTO: 31.5 % (ref 40–53)
HDLC SERPL-MCNC: 36 MG/DL
HGB BLD-MCNC: 10.2 G/DL (ref 13.3–17.7)
IMM GRANULOCYTES # BLD: 0 10E9/L (ref 0–0.4)
IMM GRANULOCYTES NFR BLD: 0.2 %
INR PPP: 1.39 (ref 0.86–1.14)
INTERPRETATION ECG - MUSE: NORMAL
IRON SATN MFR SERPL: 13 % (ref 15–46)
IRON SERPL-MCNC: 43 UG/DL (ref 35–180)
LACTATE BLD-SCNC: 1 MMOL/L (ref 0.7–2)
LDLC SERPL CALC-MCNC: 33 MG/DL
LYMPHOCYTES # BLD AUTO: 0.8 10E9/L (ref 0.8–5.3)
LYMPHOCYTES NFR BLD AUTO: 18.7 %
MAGNESIUM SERPL-MCNC: 1.9 MG/DL (ref 1.6–2.3)
MAGNESIUM SERPL-MCNC: 2 MG/DL (ref 1.6–2.3)
MCH RBC QN AUTO: 28.4 PG (ref 26.5–33)
MCHC RBC AUTO-ENTMCNC: 32.4 G/DL (ref 31.5–36.5)
MCV RBC AUTO: 88 FL (ref 78–100)
MONOCYTES # BLD AUTO: 0.5 10E9/L (ref 0–1.3)
MONOCYTES NFR BLD AUTO: 10.6 %
NEUTROPHILS # BLD AUTO: 2.9 10E9/L (ref 1.6–8.3)
NEUTROPHILS NFR BLD AUTO: 64.9 %
NONHDLC SERPL-MCNC: 43 MG/DL
NRBC # BLD AUTO: 0 10*3/UL
NRBC BLD AUTO-RTO: 0 /100
PLATELET # BLD AUTO: 209 10E9/L (ref 150–450)
POTASSIUM SERPL-SCNC: 3.3 MMOL/L (ref 3.4–5.3)
POTASSIUM SERPL-SCNC: 3.4 MMOL/L (ref 3.4–5.3)
POTASSIUM SERPL-SCNC: 3.8 MMOL/L (ref 3.4–5.3)
PROT SERPL-MCNC: 6.8 G/DL (ref 6.8–8.8)
RBC # BLD AUTO: 3.59 10E12/L (ref 4.4–5.9)
SODIUM SERPL-SCNC: 137 MMOL/L (ref 133–144)
SODIUM SERPL-SCNC: 137 MMOL/L (ref 133–144)
SODIUM SERPL-SCNC: 138 MMOL/L (ref 133–144)
TIBC SERPL-MCNC: 342 UG/DL (ref 240–430)
TRIGL SERPL-MCNC: 52 MG/DL
TROPONIN I SERPL-MCNC: <0.015 UG/L (ref 0–0.04)
TSH SERPL DL<=0.005 MIU/L-ACNC: 2.45 MU/L (ref 0.4–4)
WBC # BLD AUTO: 4.5 10E9/L (ref 4–11)

## 2021-04-08 PROCEDURE — 93306 TTE W/DOPPLER COMPLETE: CPT | Mod: 26 | Performed by: INTERNAL MEDICINE

## 2021-04-08 PROCEDURE — 85025 COMPLETE CBC W/AUTO DIFF WBC: CPT | Performed by: STUDENT IN AN ORGANIZED HEALTH CARE EDUCATION/TRAINING PROGRAM

## 2021-04-08 PROCEDURE — 36415 COLL VENOUS BLD VENIPUNCTURE: CPT | Performed by: STUDENT IN AN ORGANIZED HEALTH CARE EDUCATION/TRAINING PROGRAM

## 2021-04-08 PROCEDURE — 83036 HEMOGLOBIN GLYCOSYLATED A1C: CPT | Performed by: PHYSICIAN ASSISTANT

## 2021-04-08 PROCEDURE — 80048 BASIC METABOLIC PNL TOTAL CA: CPT | Performed by: STUDENT IN AN ORGANIZED HEALTH CARE EDUCATION/TRAINING PROGRAM

## 2021-04-08 PROCEDURE — 93010 ELECTROCARDIOGRAM REPORT: CPT | Performed by: INTERNAL MEDICINE

## 2021-04-08 PROCEDURE — G0463 HOSPITAL OUTPT CLINIC VISIT: HCPCS

## 2021-04-08 PROCEDURE — 99223 1ST HOSP IP/OBS HIGH 75: CPT | Mod: 25 | Performed by: INTERNAL MEDICINE

## 2021-04-08 PROCEDURE — 120N000003 HC R&B IMCU UMMC

## 2021-04-08 PROCEDURE — 80061 LIPID PANEL: CPT | Performed by: STUDENT IN AN ORGANIZED HEALTH CARE EDUCATION/TRAINING PROGRAM

## 2021-04-08 PROCEDURE — 97535 SELF CARE MNGMENT TRAINING: CPT | Mod: GO

## 2021-04-08 PROCEDURE — 250N000011 HC RX IP 250 OP 636: Performed by: PHYSICIAN ASSISTANT

## 2021-04-08 PROCEDURE — 83550 IRON BINDING TEST: CPT | Performed by: STUDENT IN AN ORGANIZED HEALTH CARE EDUCATION/TRAINING PROGRAM

## 2021-04-08 PROCEDURE — 84484 ASSAY OF TROPONIN QUANT: CPT | Performed by: STUDENT IN AN ORGANIZED HEALTH CARE EDUCATION/TRAINING PROGRAM

## 2021-04-08 PROCEDURE — 258N000003 HC RX IP 258 OP 636: Performed by: PHYSICIAN ASSISTANT

## 2021-04-08 PROCEDURE — 250N000013 HC RX MED GY IP 250 OP 250 PS 637: Performed by: STUDENT IN AN ORGANIZED HEALTH CARE EDUCATION/TRAINING PROGRAM

## 2021-04-08 PROCEDURE — 85610 PROTHROMBIN TIME: CPT | Performed by: STUDENT IN AN ORGANIZED HEALTH CARE EDUCATION/TRAINING PROGRAM

## 2021-04-08 PROCEDURE — 93005 ELECTROCARDIOGRAM TRACING: CPT

## 2021-04-08 PROCEDURE — 36415 COLL VENOUS BLD VENIPUNCTURE: CPT | Performed by: PHYSICIAN ASSISTANT

## 2021-04-08 PROCEDURE — 999N001017 HC STATISTIC GLUCOSE BY METER IP

## 2021-04-08 PROCEDURE — 80048 BASIC METABOLIC PNL TOTAL CA: CPT | Performed by: PHYSICIAN ASSISTANT

## 2021-04-08 PROCEDURE — 250N000012 HC RX MED GY IP 250 OP 636 PS 637: Performed by: STUDENT IN AN ORGANIZED HEALTH CARE EDUCATION/TRAINING PROGRAM

## 2021-04-08 PROCEDURE — 82728 ASSAY OF FERRITIN: CPT | Performed by: STUDENT IN AN ORGANIZED HEALTH CARE EDUCATION/TRAINING PROGRAM

## 2021-04-08 PROCEDURE — 250N000013 HC RX MED GY IP 250 OP 250 PS 637: Performed by: PHYSICIAN ASSISTANT

## 2021-04-08 PROCEDURE — 80053 COMPREHEN METABOLIC PANEL: CPT | Performed by: STUDENT IN AN ORGANIZED HEALTH CARE EDUCATION/TRAINING PROGRAM

## 2021-04-08 PROCEDURE — 83735 ASSAY OF MAGNESIUM: CPT | Performed by: STUDENT IN AN ORGANIZED HEALTH CARE EDUCATION/TRAINING PROGRAM

## 2021-04-08 PROCEDURE — 97165 OT EVAL LOW COMPLEX 30 MIN: CPT | Mod: GO

## 2021-04-08 PROCEDURE — 84443 ASSAY THYROID STIM HORMONE: CPT | Performed by: STUDENT IN AN ORGANIZED HEALTH CARE EDUCATION/TRAINING PROGRAM

## 2021-04-08 PROCEDURE — 83540 ASSAY OF IRON: CPT | Performed by: STUDENT IN AN ORGANIZED HEALTH CARE EDUCATION/TRAINING PROGRAM

## 2021-04-08 PROCEDURE — 250N000011 HC RX IP 250 OP 636: Performed by: STUDENT IN AN ORGANIZED HEALTH CARE EDUCATION/TRAINING PROGRAM

## 2021-04-08 PROCEDURE — 83605 ASSAY OF LACTIC ACID: CPT | Performed by: INTERNAL MEDICINE

## 2021-04-08 PROCEDURE — 255N000002 HC RX 255 OP 636: Performed by: INTERNAL MEDICINE

## 2021-04-08 PROCEDURE — 250N000012 HC RX MED GY IP 250 OP 636 PS 637: Performed by: PHYSICIAN ASSISTANT

## 2021-04-08 PROCEDURE — 999N000208 ECHOCARDIOGRAM COMPLETE

## 2021-04-08 RX ORDER — LISINOPRIL 10 MG/1
10 TABLET ORAL DAILY
Status: DISCONTINUED | OUTPATIENT
Start: 2021-04-08 | End: 2021-04-08

## 2021-04-08 RX ORDER — FUROSEMIDE 10 MG/ML
40 INJECTION INTRAMUSCULAR; INTRAVENOUS 2 TIMES DAILY
Status: DISCONTINUED | OUTPATIENT
Start: 2021-04-08 | End: 2021-04-08

## 2021-04-08 RX ORDER — CARVEDILOL 12.5 MG/1
25 TABLET ORAL 2 TIMES DAILY WITH MEALS
Status: DISCONTINUED | OUTPATIENT
Start: 2021-04-08 | End: 2021-04-10 | Stop reason: HOSPADM

## 2021-04-08 RX ORDER — POTASSIUM CHLORIDE 1.5 G/1.58G
60 POWDER, FOR SOLUTION ORAL ONCE
Status: COMPLETED | OUTPATIENT
Start: 2021-04-08 | End: 2021-04-08

## 2021-04-08 RX ORDER — METHYLPREDNISOLONE SODIUM SUCCINATE 125 MG/2ML
125 INJECTION, POWDER, LYOPHILIZED, FOR SOLUTION INTRAMUSCULAR; INTRAVENOUS
Status: DISCONTINUED | OUTPATIENT
Start: 2021-04-08 | End: 2021-04-10 | Stop reason: HOSPADM

## 2021-04-08 RX ORDER — NICOTINE POLACRILEX 4 MG
15-30 LOZENGE BUCCAL
Status: DISCONTINUED | OUTPATIENT
Start: 2021-04-08 | End: 2021-04-08

## 2021-04-08 RX ORDER — DIPHENHYDRAMINE HYDROCHLORIDE 50 MG/ML
50 INJECTION INTRAMUSCULAR; INTRAVENOUS
Status: DISCONTINUED | OUTPATIENT
Start: 2021-04-08 | End: 2021-04-10 | Stop reason: HOSPADM

## 2021-04-08 RX ORDER — ASPIRIN 81 MG/1
81 TABLET, CHEWABLE ORAL DAILY
Status: DISCONTINUED | OUTPATIENT
Start: 2021-04-08 | End: 2021-04-10

## 2021-04-08 RX ORDER — DEXTROSE MONOHYDRATE 25 G/50ML
25-50 INJECTION, SOLUTION INTRAVENOUS
Status: DISCONTINUED | OUTPATIENT
Start: 2021-04-08 | End: 2021-04-10 | Stop reason: HOSPADM

## 2021-04-08 RX ORDER — FUROSEMIDE 10 MG/ML
20 INJECTION INTRAMUSCULAR; INTRAVENOUS 2 TIMES DAILY
Status: DISCONTINUED | OUTPATIENT
Start: 2021-04-08 | End: 2021-04-09

## 2021-04-08 RX ORDER — AMLODIPINE BESYLATE 2.5 MG/1
2.5 TABLET ORAL DAILY
Status: DISCONTINUED | OUTPATIENT
Start: 2021-04-08 | End: 2021-04-08

## 2021-04-08 RX ORDER — POTASSIUM CHLORIDE 750 MG/1
40 TABLET, EXTENDED RELEASE ORAL ONCE
Status: COMPLETED | OUTPATIENT
Start: 2021-04-08 | End: 2021-04-08

## 2021-04-08 RX ORDER — GABAPENTIN 300 MG/1
300 CAPSULE ORAL AT BEDTIME
Status: DISCONTINUED | OUTPATIENT
Start: 2021-04-08 | End: 2021-04-10 | Stop reason: HOSPADM

## 2021-04-08 RX ORDER — DEXTROSE MONOHYDRATE 25 G/50ML
25-50 INJECTION, SOLUTION INTRAVENOUS
Status: DISCONTINUED | OUTPATIENT
Start: 2021-04-08 | End: 2021-04-08

## 2021-04-08 RX ORDER — CLOPIDOGREL BISULFATE 75 MG/1
75 TABLET ORAL DAILY
Status: DISCONTINUED | OUTPATIENT
Start: 2021-04-08 | End: 2021-04-10 | Stop reason: HOSPADM

## 2021-04-08 RX ORDER — ISOSORBIDE MONONITRATE 30 MG/1
30 TABLET, EXTENDED RELEASE ORAL DAILY
Status: DISCONTINUED | OUTPATIENT
Start: 2021-04-08 | End: 2021-04-10 | Stop reason: HOSPADM

## 2021-04-08 RX ORDER — HYDRALAZINE HYDROCHLORIDE 20 MG/ML
5 INJECTION INTRAMUSCULAR; INTRAVENOUS ONCE
Status: COMPLETED | OUTPATIENT
Start: 2021-04-08 | End: 2021-04-08

## 2021-04-08 RX ORDER — ATORVASTATIN CALCIUM 40 MG/1
40 TABLET, FILM COATED ORAL EVERY EVENING
Status: DISCONTINUED | OUTPATIENT
Start: 2021-04-08 | End: 2021-04-10 | Stop reason: HOSPADM

## 2021-04-08 RX ORDER — NICOTINE POLACRILEX 4 MG
15-30 LOZENGE BUCCAL
Status: DISCONTINUED | OUTPATIENT
Start: 2021-04-08 | End: 2021-04-10 | Stop reason: HOSPADM

## 2021-04-08 RX ORDER — LISINOPRIL 5 MG/1
5 TABLET ORAL DAILY
Status: DISCONTINUED | OUTPATIENT
Start: 2021-04-09 | End: 2021-04-10 | Stop reason: HOSPADM

## 2021-04-08 RX ORDER — CAPTOPRIL 12.5 MG/1
12.5 TABLET ORAL 3 TIMES DAILY
Status: DISCONTINUED | OUTPATIENT
Start: 2021-04-08 | End: 2021-04-08

## 2021-04-08 RX ORDER — PANTOPRAZOLE SODIUM 40 MG/1
40 TABLET, DELAYED RELEASE ORAL DAILY
Status: DISCONTINUED | OUTPATIENT
Start: 2021-04-08 | End: 2021-04-10 | Stop reason: HOSPADM

## 2021-04-08 RX ADMIN — INSULIN ASPART 1 UNITS: 100 INJECTION, SOLUTION INTRAVENOUS; SUBCUTANEOUS at 13:21

## 2021-04-08 RX ADMIN — CARVEDILOL 25 MG: 12.5 TABLET, FILM COATED ORAL at 17:32

## 2021-04-08 RX ADMIN — HUMAN ALBUMIN MICROSPHERES AND PERFLUTREN 6 ML: 10; .22 INJECTION, SOLUTION INTRAVENOUS at 10:47

## 2021-04-08 RX ADMIN — CARVEDILOL 25 MG: 12.5 TABLET, FILM COATED ORAL at 08:50

## 2021-04-08 RX ADMIN — GABAPENTIN 300 MG: 300 CAPSULE ORAL at 04:34

## 2021-04-08 RX ADMIN — INSULIN GLARGINE 20 UNITS: 100 INJECTION, SOLUTION SUBCUTANEOUS at 08:51

## 2021-04-08 RX ADMIN — FUROSEMIDE 40 MG: 10 INJECTION, SOLUTION INTRAVENOUS at 08:50

## 2021-04-08 RX ADMIN — PANTOPRAZOLE SODIUM 40 MG: 40 TABLET, DELAYED RELEASE ORAL at 08:50

## 2021-04-08 RX ADMIN — CLOPIDOGREL BISULFATE 75 MG: 75 TABLET ORAL at 08:49

## 2021-04-08 RX ADMIN — IRON SUCROSE 200 MG: 20 INJECTION, SOLUTION INTRAVENOUS at 16:13

## 2021-04-08 RX ADMIN — POTASSIUM CHLORIDE 60 MEQ: 1.5 POWDER, FOR SOLUTION ORAL at 05:32

## 2021-04-08 RX ADMIN — LISINOPRIL 10 MG: 10 TABLET ORAL at 09:59

## 2021-04-08 RX ADMIN — ISOSORBIDE MONONITRATE 30 MG: 30 TABLET, EXTENDED RELEASE ORAL at 08:50

## 2021-04-08 RX ADMIN — FUROSEMIDE 20 MG: 10 INJECTION, SOLUTION INTRAVENOUS at 15:09

## 2021-04-08 RX ADMIN — ATORVASTATIN CALCIUM 40 MG: 40 TABLET, FILM COATED ORAL at 19:31

## 2021-04-08 RX ADMIN — ASPIRIN 81 MG CHEWABLE TABLET 81 MG: 81 TABLET CHEWABLE at 09:59

## 2021-04-08 RX ADMIN — GABAPENTIN 300 MG: 300 CAPSULE ORAL at 22:17

## 2021-04-08 RX ADMIN — LEVOTHYROXINE SODIUM 137 MCG: 0.03 TABLET ORAL at 19:31

## 2021-04-08 RX ADMIN — POTASSIUM CHLORIDE 40 MEQ: 750 TABLET, EXTENDED RELEASE ORAL at 09:59

## 2021-04-08 RX ADMIN — INSULIN ASPART 1 UNITS: 100 INJECTION, SOLUTION INTRAVENOUS; SUBCUTANEOUS at 17:34

## 2021-04-08 RX ADMIN — HYDRALAZINE HYDROCHLORIDE 5 MG: 20 INJECTION INTRAMUSCULAR; INTRAVENOUS at 05:46

## 2021-04-08 ASSESSMENT — ACTIVITIES OF DAILY LIVING (ADL)
TOILETING_ISSUES: NO
FALL_HISTORY_WITHIN_LAST_SIX_MONTHS: NO
WERE_AUXILIARY_AIDS_OFFERED?: YES
HEARING_DIFFICULTY_OR_DEAF: YES
DESCRIBE_HEARING_LOSS: BILATERAL HEARING LOSS
DIFFICULTY_EATING/SWALLOWING: NO
DOING_ERRANDS_INDEPENDENTLY_DIFFICULTY: NO
VISION_MANAGEMENT: GLASSES
ADLS_ACUITY_SCORE: 15
WALKING_OR_CLIMBING_STAIRS_DIFFICULTY: YES
ADLS_ACUITY_SCORE: 15
WALKING_OR_CLIMBING_STAIRS: STAIR CLIMBING DIFFICULTY, ASSISTANCE 1 PERSON
ADLS_ACUITY_SCORE: 15
DRESSING/BATHING_DIFFICULTY: NO
THE_FOLLOWING_AIDS_WERE_PROVIDED;: PATIENT DECLINED OFFER OF AUXILIARY AIDS
PATIENT_/_FAMILY_COMMUNICATION_STYLE: SPOKEN LANGUAGE (ENGLISH OR BILINGUAL)
DIFFICULTY_COMMUNICATING: NO
PATIENT'S_PREFERRED_MEANS_OF_COMMUNICATION: ENGLISH SPEAKER WITH HEARING LOSS, NO SPEECH PROBLEMS.
WEAR_GLASSES_OR_BLIND: YES
ADLS_ACUITY_SCORE: 14
ADLS_ACUITY_SCORE: 15
CONCENTRATING,_REMEMBERING_OR_MAKING_DECISIONS_DIFFICULTY: YES

## 2021-04-08 ASSESSMENT — MIFFLIN-ST. JEOR: SCORE: 2076.5

## 2021-04-08 NOTE — ED NOTES
[facility]   ED Nurse to Floor Handoff     Jayro Elder is a 70 year old male who speaks English and lives unknown,  in a home  They arrived in the ED by car from home    ED Chief Complaint: Shortness of Breath and Chest Pain    ED Dx;   Final diagnoses:   Chest pain, unspecified type   Acute on chronic systolic congestive heart failure (H)         Needed?: No    Allergies:   Allergies   Allergen Reactions     No Known Allergies    .  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     Cardiomyopathy (H)      Cellulitis of left lower extremity 3/13/2018     Cerebral infarction (H)     eye sight decreased in peripheral of right side and blurry     Diabetic ulcer of left midfoot associated with type 2 diabetes mellitus, with fat layer exposed (H) 3/13/2018     Essential hypertension, benign 11/13/2002     Generalized osteoarthrosis, unspecified site 11/13/2002     Microalbuminuria 3/13/2018    X1     Myocardial infarction (H)      Neuropathy      Sepsis (H)      Sleep apnea     doesn't use it     Substance abuse (H)      Syncope, unspecified syncope type 3/13/2018     Thyroid disease     takes medicine     Tobacco use disorder 11/13/2002     Type 2 diabetes mellitus with diabetic peripheral angiopathy without gangrene, unspecified long term insulin use status 2005      Baseline Mental status: WDL  Current Mental Status changes: at basesline    Infection present or suspected this encounter: no  Sepsis suspected: No  Isolation type: No active isolations  Patient tested for COVID 19 prior to admission: YES     Activity level - Baseline/Home:  Independent  Activity Level - Current:   Independent    Bariatric equipment needed?: No    In the ED these meds were given:   Medications   furosemide (LASIX) injection  40 mg (has no administration in time range)   furosemide (LASIX) injection 40 mg (40 mg Intravenous Given 4/7/21 2133)   furosemide (LASIX) injection 40 mg (40 mg Intravenous Given 4/7/21 2237)       Drips running?  No    Home pump  No    Current LDAs  Peripheral IV 04/07/21 Anterior;Right Upper forearm (Active)   Number of days: 1       Labs results:   Labs Ordered and Resulted from Time of ED Arrival Up to the Time of Departure from the ED   CBC WITH PLATELETS DIFFERENTIAL - Abnormal; Notable for the following components:       Result Value    RBC Count 3.55 (*)     Hemoglobin 10.3 (*)     Hematocrit 31.7 (*)     Absolute Lymphocytes 0.7 (*)     All other components within normal limits   COMPREHENSIVE METABOLIC PANEL - Abnormal; Notable for the following components:    Glucose 179 (*)     Creatinine 1.45 (*)     GFR Estimate 48 (*)     GFR Estimate If Black 56 (*)     Protein Total 6.7 (*)     All other components within normal limits   UA MACROSCOPIC WITH REFLEX TO MICRO AND CULTURE - Abnormal; Notable for the following components:    Protein Albumin Urine 10 (*)     Urobilinogen mg/dL 8.0 (*)     Mucous Urine Present (*)     All other components within normal limits   NT PROBNP INPATIENT - Abnormal; Notable for the following components:    N-Terminal Pro BNP Inpatient 1,292 (*)     All other components within normal limits   TROPONIN I   INFLUENZA A/B & SARS-COV2 PCR MULTIPLEX   TROPONIN I   PERIPHERAL IV CATHETER       Imaging Studies:   Recent Results (from the past 24 hour(s))   XR Chest Port 1 View    Narrative    EXAM: XR CHEST PORT 1 VW  4/7/2021 8:11 PM     HISTORY:  chest pain       COMPARISON:  Chest x-ray 1/3/2021    FINDINGS: AP radiograph of the chest. The cardiomediastinal silhouette  is enlarged. Low lung volumes. Coronary artery stents. Pulmonary  vasculature is indistinct. Diffuse interstitial predominant opacities.  The left costophrenic angle is collimated out of view. No significant  pleural  "effusion. No pneumothorax. Visualized upper abdomen is  unremarkable. Multiple chronic appearing left-sided rib fractures. .      Impression    IMPRESSION:  1. Diffuse interstitial prominent opacities likely representing  pulmonary edema.  2. Cardiomegaly.    I have personally reviewed the examination and initial interpretation  and I agree with the findings.    MERARI LOYOLA, DO       Recent vital signs:   BP (!) 150/91   Pulse 68   Temp 98.6  F (37  C) (Oral)   Resp 18   Ht 1.93 m (6' 4\")   SpO2 98%   BMI 31.53 kg/m      Radha Coma Scale Score: 15 (04/07/21 2035)       Cardiac Rhythm: A fib  Pt needs tele? Yes  Skin/wound Issues: None    Code Status: Full Code    Pain control: fair    Nausea control: pt had none    Abnormal labs/tests/findings requiring intervention: see results    Family present during ED course? No   Family Comments/Social Situation comments: NA    Tasks needing completion: None    Sarai Santamaria, RN  [phone] [facility] ED      "

## 2021-04-08 NOTE — ED PROVIDER NOTES
"    [place] EMERGENCY DEPARTMENT ([facility])  4/07/21   ED 5   7:58 PM   History     Chief Complaint   Patient presents with     Shortness of Breath     Chest Pain     The history is provided by the patient and medical records.     Jayro Elder is a 70 year old male with history of CAD s/p multiple stents, heart failure, atrial fibrillation on Eliquis who presents with chest pain, shortness of breath, fatigue, decreased activity and unintentional weight loss. He noticed fatigue and decreased activity intolerance over the past few days. He has had chest pain and shortness of breath with this, the chest pain and shortness of breath occur together. Symptoms are improved with rest. He notes prior history of 10 stents, last stent was 3 years ago. He also has a persistent salty taste in his mouth. Gets dizzy when he gets short of breath, has to find a chair. He also has had increased lower extremity edema (left<right)and unexpected weight gain. He is on Lasix 40 mg. He also has some back pain between his shoulders, \"hurts for no reason.\" he denies any extra activity. No increased pain or shortness of breath with exertion. He states his symptoms today isn't similar to when he had an MI. No fever or cough. No nausea or vomiting. Patient used to follow with Dr. Stefany Perales of Cardiology but she retired. He is supposed to see one next month but can't get in.     Epic records reviewed, patient had:  - echo 1/4/2021 that showed EF of 30-35% with moderately reduced left ventricular systolic dysfunction, regional wall motion abnormalities. This was unchanged from previous.  - stress echocardiogram 4/22/20 was abnormal.  Left ventricular function is moderately reduced, EF 37% with akinesis of the mid to distal anterior, anteroseptal and apical walls. There was a medium sized area of a severe degree of infarction in the mid to distal anterior, apical and anteroseptal segment(s) of the left ventricle.  No ischemia is " identified.    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     Cardiomyopathy (H)      Cellulitis of left lower extremity 3/13/2018     Cerebral infarction (H)     eye sight decreased in peripheral of right side and blurry     Diabetic ulcer of left midfoot associated with type 2 diabetes mellitus, with fat layer exposed (H) 3/13/2018     Essential hypertension, benign 11/13/2002     Generalized osteoarthrosis, unspecified site 11/13/2002     Microalbuminuria 3/13/2018    X1     Myocardial infarction (H)      Neuropathy      Sepsis (H)      Sleep apnea     doesn't use it     Substance abuse (H)      Syncope, unspecified syncope type 3/13/2018     Thyroid disease     takes medicine     Tobacco use disorder 11/13/2002     Type 2 diabetes mellitus with diabetic peripheral angiopathy without gangrene, unspecified long term insulin use status 2005     Past Surgical History:   Procedure Laterality Date     AMPUTATE TOE(S) Right 1/6/2019    Procedure: Right open second toe partial amputation;  Surgeon: Sabino Garcia DPM;  Location: [facility] OR     AMPUTATE TOE(S) Right 1/8/2019    Procedure: 1.  Revision right second toe amputation with resection of distal half of proximal phalanx with full closure.    2.  Debridement of callus/preulcerative lesion, distal left second toe and plantar left first metatarsophalangeal joint.;  Surgeon: Ryan Bhagat DPM;  Location: [facility] OR     AMPUTATE TOE(S)  Right 3/12/2019    Procedure: Partial right fourth toe amputation.;  Surgeon: Ryan Bhagat DPM;  Location: [facility] OR     AMPUTATE TOE(S) Right 5/6/2019    Procedure: Right partial third toe amputation;  Surgeon: Татьяна Mckeon DPM, Podiatry/Foot and Ankle Surgery;  Location: [facility] OR     AMPUTATE TOE(S) Left 4/18/2020    Procedure: LEFT SECOND TOE AMPUTATION;  Surgeon: Ryan Bhagat DPM;  Location: [facility] OR     ANGIOGRAM  6/29/05    subtotal occ.mid LAD,SUSAN mid LAD     ANGIOGRAM  7/05    mild CAD,patent stent,no flow-limiting lesions,sev.LV dysf.LAD enlarged     ANGIOGRAM  2/09    Sev.single vessel disease,Mod LV dysf.distal LAD 90%,70-75% mid lad just before prev stent,SUSAN to prox.mid LAD, endeavor to distal LAD     ANGIOGRAM  11/13/13    restenosis, stent LAD     BACK SURGERY      back surgery x3     CV CORONARY ANGIOGRAM N/A 7/8/2019    Procedure: Coronary Angiogram;  Surgeon: Jose Alfredo Crockett MD;  Location:  HEART CARDIAC CATH LAB     CV LEFT HEART CATH N/A 7/8/2019    Procedure: Left Heart Cath;  Surgeon: Jose Alfredo Crockett MD;  Location:  HEART CARDIAC CATH LAB     CV PCI ASPIRATION THROMECTOMY N/A 7/8/2019    Procedure: PCI Aspiration Thrombectomy;  Surgeon: Jose Alfredo Crockett MD;  Location:  HEART CARDIAC CATH LAB     CV PCI STENT DRUG ELUTING N/A 7/8/2019    Procedure: PCI Stent Drug Eluting;  Surgeon: Jose Alfredo Crockett MD;  Location:  HEART CARDIAC CATH LAB     HEART CATH LEFT HEART CATH  06/13/2017    SUSAN to OM3     INCISION AND DRAINAGE TOE, COMBINED Bilateral 9/11/2018    Procedure: COMBINED INCISION AND DRAINAGE TOE;  1) Irrigation and debridement ulcer left great toe  2) Amputation 3rd toe right foot at distal interphalangeal joint level;  Surgeon: Miki Harp MD;  Location:  OR     IRRIGATION AND DEBRIDEMENT FOOT, COMBINED Left 3/12/2019    Procedure: Debridement of left foot ulcer sub first MPJ 2 x 2.5 cm down to and including subcutaneous tissue, callus debridement  for preulcerative lesion, left second toe.;  Surgeon: Ryan Bhagat DPM;  Location: [facility] OR     ORTHOPEDIC SURGERY      bunion surgery both feet     ORTHOPEDIC SURGERY      left shoulder     SOFT TISSUE SURGERY      debridement of toe numerous time     VASCULAR SURGERY      7 stents in heart     [facility] NONSPECIFIC PROCEDURE      Laminectomy x 3 - (1983 x 2 & 1990)     [facility] NONSPECIFIC PROCEDURE Bilateral 1998    Bunionectomy     [facility] NONSPECIFIC PROCEDURE  1959    Gingival surgery at age 9     apixaban ANTICOAGULANT (ELIQUIS) 5 MG tablet  atorvastatin (LIPITOR) 40 MG tablet  carvedilol (COREG) 25 MG tablet  clopidogrel (PLAVIX) 75 MG tablet  furosemide (LASIX) 40 MG tablet  gabapentin (NEURONTIN) 300 MG capsule  gabapentin (NEURONTIN) 300 MG capsule  insulin aspart (NOVOLOG FLEXPEN) 100 UNIT/ML pen  insulin aspart (NOVOLOG PEN) 100 UNIT/ML pen  insulin aspart (NOVOLOG PEN) 100 UNIT/ML pen  insulin glargine (LANTUS PEN) 100 UNIT/ML pen  isosorbide mononitrate (IMDUR) 30 MG 24 hr tablet  levothyroxine (SYNTHROID, LEVOTHROID) 137 MCG tablet  pantoprazole (PROTONIX) 40 MG EC tablet  Cadexomer Iodine, topical, 0.9% (IODOSORB) 0.9 % GEL gel  Continuous Blood Gluc  (FREESTYLE CLARITA 2 READER SYSTM) DRAGAN  Continuous Blood Gluc Sensor (FREESTYLE CLARITA 2 SENSOR SYSTM) MISC  insulin pen needle (31G X 6 MM) 31G X 6 MM miscellaneous  nitroglycerin (NITROSTAT) 0.4 MG sublingual tablet  order for DME  order for DME      Allergies   Allergen Reactions     No Known Allergies      Family History  Family History   Problem Relation Age of Onset     Cancer - colorectal Sister      Thyroid Disease Brother      Cardiovascular Brother      Diabetes Brother      Cancer Father         tumor in chest and throat     Arthritis Mother      Thyroid Disease Mother      Diabetes Mother      Glaucoma No family hx of      Macular Degeneration No family hx of      Social History   Social History     Tobacco Use     Smoking status: Former Smoker  "    Packs/day: 1.00     Years: 25.00     Pack years: 25.00     Types: Cigarettes     Start date: 1980     Quit date: 7/25/2005     Years since quitting: 15.7     Smokeless tobacco: Never Used   Substance Use Topics     Alcohol use: Yes     Alcohol/week: 4.0 standard drinks     Types: 4 Shots of liquor per week     Frequency: Never     Comment: OCCASSIONALLY      Drug use: No      Past medical history, past surgical history, medications, allergies, family history, and social history were reviewed with the patient. No additional pertinent items.       Review of Systems   Constitutional: Positive for activity change (decreased activity tolerance) and fatigue. Negative for fever.   Respiratory: Positive for shortness of breath.    Cardiovascular: Positive for chest pain and leg swelling.   Gastrointestinal: Negative for nausea and vomiting.   Neurological: Positive for light-headedness (generalized).   All other systems reviewed and are negative.    A complete review of systems was performed with pertinent positives and negatives noted in the HPI, and all other systems negative.    Physical Exam   BP: (!) 152/94  Pulse: 88  Temp: 98.6  F (37  C)  Resp: 18  Height: 193 cm (6' 4\")  SpO2: 100 %  Physical Exam  Constitutional:       General: He is not in acute distress.     Appearance: He is not diaphoretic.   HENT:      Head: Normocephalic.      Mouth/Throat:      Pharynx: No oropharyngeal exudate.   Eyes:      Extraocular Movements: Extraocular movements intact.   Neck:      Musculoskeletal: Neck supple.   Cardiovascular:      Rate and Rhythm: Normal rate. Rhythm irregular.      Heart sounds: Normal heart sounds.   Pulmonary:      Effort: No respiratory distress.      Comments: Basilar crackles  Abdominal:      General: There is no distension.      Palpations: Abdomen is soft.      Tenderness: There is no abdominal tenderness.   Musculoskeletal:         General: No deformity.      Right lower leg: Edema present.      Left " lower leg: Edema present.   Skin:     General: Skin is dry.   Neurological:      Mental Status: He is alert.      Comments: alert   Psychiatric:         Behavior: Behavior normal.         ED Course      Procedures             EKG Interpretation:      Interpreted by Livan Srinivasan DO  Time reviewed: 2000  Symptoms at time of EKG: Chest pain, shortness of breath  Rhythm: Atrial fibrillation  Rate: normal  Axis: normal  Ectopy: none  Conduction: Right bundle branch block left anterior fascicular block ST Segments/ T Waves: No ST-T wave changes  Q Waves: none  Comparison to prior: Unchanged    Clinical Impression: Abnormal EKG      Results for orders placed or performed during the hospital encounter of 04/07/21   XR Chest Port 1 View     Status: None    Narrative    EXAM: XR CHEST PORT 1 VW  4/7/2021 8:11 PM     HISTORY:  chest pain       COMPARISON:  Chest x-ray 1/3/2021    FINDINGS: AP radiograph of the chest. The cardiomediastinal silhouette  is enlarged. Low lung volumes. Coronary artery stents. Pulmonary  vasculature is indistinct. Diffuse interstitial predominant opacities.  The left costophrenic angle is collimated out of view. No significant  pleural effusion. No pneumothorax. Visualized upper abdomen is  unremarkable. Multiple chronic appearing left-sided rib fractures. .      Impression    IMPRESSION:  1. Diffuse interstitial prominent opacities likely representing  pulmonary edema.  2. Cardiomegaly.    I have personally reviewed the examination and initial interpretation  and I agree with the findings.    MERARI LOYOLA DO   CBC with platelets differential     Status: Abnormal   Result Value Ref Range    WBC 5.3 4.0 - 11.0 10e9/L    RBC Count 3.55 (L) 4.4 - 5.9 10e12/L    Hemoglobin 10.3 (L) 13.3 - 17.7 g/dL    Hematocrit 31.7 (L) 40.0 - 53.0 %    MCV 89 78 - 100 fl    MCH 29.0 26.5 - 33.0 pg    MCHC 32.5 31.5 - 36.5 g/dL    RDW 13.5 10.0 - 15.0 %    Platelet Count 203 150 - 450 10e9/L    Diff Method  Automated Method     % Neutrophils 72.3 %    % Lymphocytes 13.0 %    % Monocytes 9.1 %    % Eosinophils 4.6 %    % Basophils 0.6 %    % Immature Granulocytes 0.4 %    Nucleated RBCs 0 0 /100    Absolute Neutrophil 3.8 1.6 - 8.3 10e9/L    Absolute Lymphocytes 0.7 (L) 0.8 - 5.3 10e9/L    Absolute Monocytes 0.5 0.0 - 1.3 10e9/L    Absolute Eosinophils 0.2 0.0 - 0.7 10e9/L    Absolute Basophils 0.0 0.0 - 0.2 10e9/L    Abs Immature Granulocytes 0.0 0 - 0.4 10e9/L    Absolute Nucleated RBC 0.0    Comprehensive metabolic panel     Status: Abnormal   Result Value Ref Range    Sodium 137 133 - 144 mmol/L    Potassium 4.0 3.4 - 5.3 mmol/L    Chloride 101 94 - 109 mmol/L    Carbon Dioxide 29 20 - 32 mmol/L    Anion Gap 7 3 - 14 mmol/L    Glucose 179 (H) 70 - 99 mg/dL    Urea Nitrogen 22 7 - 30 mg/dL    Creatinine 1.45 (H) 0.66 - 1.25 mg/dL    GFR Estimate 48 (L) >60 mL/min/[1.73_m2]    GFR Estimate If Black 56 (L) >60 mL/min/[1.73_m2]    Calcium 8.9 8.5 - 10.1 mg/dL    Bilirubin Total 1.3 0.2 - 1.3 mg/dL    Albumin 3.6 3.4 - 5.0 g/dL    Protein Total 6.7 (L) 6.8 - 8.8 g/dL    Alkaline Phosphatase 108 40 - 150 U/L    ALT 30 0 - 70 U/L    AST 15 0 - 45 U/L   Troponin I     Status: None   Result Value Ref Range    Troponin I ES <0.015 0.000 - 0.045 ug/L   UA reflex to Microscopic and Culture     Status: Abnormal    Specimen: Urine Midstream; Midstream Urine   Result Value Ref Range    Color Urine Yellow     Appearance Urine Clear     Glucose Urine Negative NEG^Negative mg/dL    Bilirubin Urine Negative NEG^Negative    Ketones Urine Negative NEG^Negative mg/dL    Specific Gravity Urine 1.020 1.003 - 1.035    Blood Urine Negative NEG^Negative    pH Urine 7.0 5.0 - 7.0 pH    Protein Albumin Urine 10 (A) NEG^Negative mg/dL    Urobilinogen mg/dL 8.0 (H) 0.0 - 2.0 mg/dL    Nitrite Urine Negative NEG^Negative    Leukocyte Esterase Urine Negative NEG^Negative    Source Midstream Urine     RBC Urine <1 0 - 2 /HPF    WBC Urine <1 0 - 5  /HPF    Squamous Epithelial /HPF Urine 0 0 - 1 /HPF    Mucous Urine Present (A) NEG^Negative /LPF   Nt probnp inpatient (BNP)     Status: Abnormal   Result Value Ref Range    N-Terminal Pro BNP Inpatient 1,292 (H) 0 - 900 pg/mL          Results for orders placed or performed during the hospital encounter of 04/07/21   XR Chest Port 1 View     Status: None    Narrative    EXAM: XR CHEST PORT 1 VW  4/7/2021 8:11 PM     HISTORY:  chest pain       COMPARISON:  Chest x-ray 1/3/2021    FINDINGS: AP radiograph of the chest. The cardiomediastinal silhouette  is enlarged. Low lung volumes. Coronary artery stents. Pulmonary  vasculature is indistinct. Diffuse interstitial predominant opacities.  The left costophrenic angle is collimated out of view. No significant  pleural effusion. No pneumothorax. Visualized upper abdomen is  unremarkable. Multiple chronic appearing left-sided rib fractures. .      Impression    IMPRESSION:  1. Diffuse interstitial prominent opacities likely representing  pulmonary edema.  2. Cardiomegaly.    I have personally reviewed the examination and initial interpretation  and I agree with the findings.    MERARI LOYOLA, DO   CBC with platelets differential     Status: Abnormal   Result Value Ref Range    WBC 5.3 4.0 - 11.0 10e9/L    RBC Count 3.55 (L) 4.4 - 5.9 10e12/L    Hemoglobin 10.3 (L) 13.3 - 17.7 g/dL    Hematocrit 31.7 (L) 40.0 - 53.0 %    MCV 89 78 - 100 fl    MCH 29.0 26.5 - 33.0 pg    MCHC 32.5 31.5 - 36.5 g/dL    RDW 13.5 10.0 - 15.0 %    Platelet Count 203 150 - 450 10e9/L    Diff Method Automated Method     % Neutrophils 72.3 %    % Lymphocytes 13.0 %    % Monocytes 9.1 %    % Eosinophils 4.6 %    % Basophils 0.6 %    % Immature Granulocytes 0.4 %    Nucleated RBCs 0 0 /100    Absolute Neutrophil 3.8 1.6 - 8.3 10e9/L    Absolute Lymphocytes 0.7 (L) 0.8 - 5.3 10e9/L    Absolute Monocytes 0.5 0.0 - 1.3 10e9/L    Absolute Eosinophils 0.2 0.0 - 0.7 10e9/L    Absolute Basophils 0.0 0.0 -  0.2 10e9/L    Abs Immature Granulocytes 0.0 0 - 0.4 10e9/L    Absolute Nucleated RBC 0.0    Comprehensive metabolic panel     Status: Abnormal   Result Value Ref Range    Sodium 137 133 - 144 mmol/L    Potassium 4.0 3.4 - 5.3 mmol/L    Chloride 101 94 - 109 mmol/L    Carbon Dioxide 29 20 - 32 mmol/L    Anion Gap 7 3 - 14 mmol/L    Glucose 179 (H) 70 - 99 mg/dL    Urea Nitrogen 22 7 - 30 mg/dL    Creatinine 1.45 (H) 0.66 - 1.25 mg/dL    GFR Estimate 48 (L) >60 mL/min/[1.73_m2]    GFR Estimate If Black 56 (L) >60 mL/min/[1.73_m2]    Calcium 8.9 8.5 - 10.1 mg/dL    Bilirubin Total 1.3 0.2 - 1.3 mg/dL    Albumin 3.6 3.4 - 5.0 g/dL    Protein Total 6.7 (L) 6.8 - 8.8 g/dL    Alkaline Phosphatase 108 40 - 150 U/L    ALT 30 0 - 70 U/L    AST 15 0 - 45 U/L   Troponin I     Status: None   Result Value Ref Range    Troponin I ES <0.015 0.000 - 0.045 ug/L   UA reflex to Microscopic and Culture     Status: Abnormal    Specimen: Urine Midstream; Midstream Urine   Result Value Ref Range    Color Urine Yellow     Appearance Urine Clear     Glucose Urine Negative NEG^Negative mg/dL    Bilirubin Urine Negative NEG^Negative    Ketones Urine Negative NEG^Negative mg/dL    Specific Gravity Urine 1.020 1.003 - 1.035    Blood Urine Negative NEG^Negative    pH Urine 7.0 5.0 - 7.0 pH    Protein Albumin Urine 10 (A) NEG^Negative mg/dL    Urobilinogen mg/dL 8.0 (H) 0.0 - 2.0 mg/dL    Nitrite Urine Negative NEG^Negative    Leukocyte Esterase Urine Negative NEG^Negative    Source Midstream Urine     RBC Urine <1 0 - 2 /HPF    WBC Urine <1 0 - 5 /HPF    Squamous Epithelial /HPF Urine 0 0 - 1 /HPF    Mucous Urine Present (A) NEG^Negative /LPF   Nt probnp inpatient (BNP)     Status: Abnormal   Result Value Ref Range    N-Terminal Pro BNP Inpatient 1,292 (H) 0 - 900 pg/mL     Medications   furosemide (LASIX) injection 40 mg (has no administration in time range)   furosemide (LASIX) injection 40 mg (40 mg Intravenous Given 4/7/21 2136)         Assessments & Plan (with Medical Decision Making)   70-year-old male presents to us with a chief complaint of shortness of breath and chest pain.  Differential includes but not limited to angina, acute coronary syndrome, heart failure, pneumonia, anemia.  Patient was mildly hypertensive but vital signs are otherwise unremarkable.  EKG was unchanged from prior.  Labs were reviewed.  Troponin was normal today.  BNP was elevated at 1292.  Creatinine was slightly elevated at 1.4.  Labs are otherwise unremarkable.  Chest x-ray did show pulmonary edema.  Patient was given Lasix with decent urine output.  He still reports occasional twinges of his chest pain.  The patient did have a positive stress test a year ago.  He did not get a catheterization after this.  Given this positive stress test in association with exertional symptoms reminiscent of the previous MI I do feel is appropriate to admit him to the cardiology service.  Discussed with  who accepted the admission    I have reviewed the nursing notes. I have reviewed the findings, diagnosis, plan and need for follow up with the patient.    New Prescriptions    No medications on file       Final diagnoses:   Chest pain, unspecified type   Acute on chronic systolic congestive heart failure (H)       --  Livan Srinivasan  [facility] EMERGENCY DEPARTMENT  4/7/2021     Livan Srinivasan DO  04/07/21 1747

## 2021-04-08 NOTE — H&P
[facility]    Cardiology History and Physical - Cards 1         Date of Admission:  4/7/2021    Assessment & Plan: HVSL    Jayro Elder is a 70 year old male with PMH CAD with multiple stents (10 stents placed, last stent placed within 3 years per patient), HFrEF (30-35%), ICM, HTN, pAfib on Apixaban, multiple strokes (x4 per patient), DM2 c/b neuropathy, MARISOL, hypothyroidism, chronic diabetic foot ulcer of LLE, GERD who presents with complaints of chest pain and dyspnea.       Plan:     Atypical Chest Pain  CAD w/ hx of multiple stent placements   Uncontrolled Hypertension   ICM  Presents with chest pain of few days duration, mainly left sided chest pain. Intermittent and only lasting a few seconds. Patient initially requested nitroglycerin for chest pain, but pain was resolved prior to administration. Chest pain is atypical, but given patient's extensive cardiac history and known CAD with 100% stenosis of RCA on RHC in 2019, it would be reasonable to obtain stress test vs Cath in the AM.   - Hydralazine 5mg IV once for uncontrolled BP   - Started Captopril 12.5 mg TID for both uncontrolled BP and HFrEF   - Trop x3 WNL   - EKG without ST elevations or depressions   - On telemetry   - NPO for possible nuc stress test vs Cath in the AM       Acute on Chronic HFrEF Exacerbation (EF 30-35%)   pAfib on Apixaban   Approximately 20 pound weight gain with significant dyspnea and nightly PND. JVD to mandible, bilateral crackles present, 3+ pitting edema bilaterally. BNP elevated at 1292. Patient was recently hospitalized in January for orthostatic hypotension and instructed to decrease his PTA Lasix from 40 mg to 20 mg, however he states that he did not do this and has been taking Lasix 40 mg daily.   - Inotrope: None   - BB: PTA Carvedilol 25 mg PO BID   - ACEi/ARB/ARNI: Started Captopril 12.5 mg PO TID. Can increase to 25 mg TID if systolic BP remains >100 after 3-5  doses   - Aldosterone antagonist: None   - Diuretic: On Furosemide 40 mg IV BID. PTA regimen: Furosemide 40 mg every day.   - Preload Reduction: PTA Imdur   - Daily Weights, Strict I/O's  - Low Na Diet  - 2L Fluid Restriction  - PTA Apixaban for Afib     Hx of Multiple Strokes   - PTA Plavix     ABNER on CKD 3   Cardiorenal Syndrome   Cr elevated at 1.45 on admission. Baseline around 1.2. Likely cardiorenal related to volume overloaded state. Improving with diuresis.   - Avoid nephrotoxic agents   - BMP BID   - Electrolyte replacement PRN     DM2 c/b neuropathy and LLE diabetic ulcer   - PTA Insulin Glargine 43-45 units QAM decreased to 20 units in setting of patient being NPO   - med dose sliding scale insulin   - hypoglycemia protocol   - WOC consult for chronic LLE diabetic ulcer     HLD   - PTA Atorvastatin     Hypothyroidism   TSH WNL   - PTA Levothyroxine     GERD   - PTA Pantoprazole     Diet: NPO for possible stress test vs cath   DVT Prophylaxis: On PTA Apixaban   Taylor Catheter: not present  Code Status: FULL   Fluids: None   Lines: PIV          Disposition Plan   Anticipate pt's admission to span two midnights or more pending further workup.    Patient to be formally staffed in the AM.     Arlette Borja MD   Internal Medicine, PGY-2   P: [id number]   [facility]    ______________________________________________________________________    Chief Complaint   Dyspnea   Chest Pain     History is obtained from the patient and chart.     History of Present Illness     Jayro Elder is a 70 year old male with PMH CAD with multiple stents (10 stents placed, last stent placed within 3 years per patient), HFrEF (30-35%), ICM, pAfib on Apixaban, multiple strokes (x4 per patient), DM2 c/b neuropathy and LLE ulcer, MARISOL, hypothyroidism, GERD who presents with complaints of chest pain and dyspnea. Patient states that his dyspnea began about one week ago, and seems to  have worsened after his second COVID vaccine. He is increasingly fatigued and notices worsening dyspnea particularly with movement. He has also noticed 20 pound weight gain, dry weight is around 247-250, currently weighs 267 lbs. He also has worsening lower extremity edema bilaterally as well as increased abdominal girth from bloating. He denies orthopnea, but mentions that he often sleeps on his side. Endorses PND every night. States he takes his medications as prescribed. He was recently hospitalized on 1/3/21 to 1/4/21 with complaints of light headedness/orthostatic hypotension and was told to decreased his Lasix from 40mg to 20mg, however he states that he did not make this medication adjustment and has been taking Lasix 40 mg every day. He tries to avoid salt in his diet, weighs himself daily, and drinks less than 2 L each day.     In addition, he endorses chest pain of few days duration. Pain is mostly located on his left chest wall, intermittent, 3-5/10 when it occurs, without radiation towards back/jaw/down arms. Troponin has been negative, EKG without ST elevation or depression. Initially requested nitroglycerine for his chest pain, but pain was gone before it was administered. Pain occurs at rest and is not related to exertion.     ROS positive for abdominal pain which is constant and about a 3/10. Related to increased abdominal girth.     Review of Systems    The 10 point Review of Systems is negative other than noted in the HPI or here.     Past Medical History    I have reviewed this patient's medical history and updated it with pertinent information if needed.   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     Cardiomyopathy (H)      Cellulitis of left lower extremity 3/13/2018     Cerebral infarction (H)     eye sight decreased in peripheral of right side and blurry      Diabetic ulcer of left midfoot associated with type 2 diabetes mellitus, with fat layer exposed (H) 3/13/2018     Essential hypertension, benign 11/13/2002     Generalized osteoarthrosis, unspecified site 11/13/2002     Microalbuminuria 3/13/2018    X1     Myocardial infarction (H)      Neuropathy      Sepsis (H)      Sleep apnea     doesn't use it     Substance abuse (H)      Syncope, unspecified syncope type 3/13/2018     Thyroid disease     takes medicine     Tobacco use disorder 11/13/2002     Type 2 diabetes mellitus with diabetic peripheral angiopathy without gangrene, unspecified long term insulin use status 2005       Past Surgical History   I have reviewed this patient's surgical history and updated it with pertinent information if needed.  Past Surgical History:   Procedure Laterality Date     AMPUTATE TOE(S) Right 1/6/2019    Procedure: Right open second toe partial amputation;  Surgeon: Sabino Garcia DPM;  Location: [facility] OR     AMPUTATE TOE(S) Right 1/8/2019    Procedure: 1.  Revision right second toe amputation with resection of distal half of proximal phalanx with full closure.    2.  Debridement of callus/preulcerative lesion, distal left second toe and plantar left first metatarsophalangeal joint.;  Surgeon: Ryan Bhagat DPM;  Location: [facility] OR     AMPUTATE TOE(S) Right 3/12/2019    Procedure: Partial right fourth toe amputation.;  Surgeon: Ryan Bhagat DPM;  Location: [facility] OR     AMPUTATE TOE(S) Right 5/6/2019    Procedure: Right partial third toe amputation;  Surgeon: Татьяна Mckeon DPM, Podiatry/Foot and Ankle Surgery;  Location: [facility] OR     AMPUTATE TOE(S) Left 4/18/2020    Procedure: LEFT SECOND TOE AMPUTATION;  Surgeon: Ryan Bhagat DPM;  Location: [facility] OR     ANGIOGRAM  6/29/05    subtotal occ.mid LAD,SUSAN mid LAD     ANGIOGRAM  7/05    mild CAD,patent stent,no flow-limiting lesions,sev.LV dysf.LAD enlarged     ANGIOGRAM  2/09    Sev.single vessel disease,Mod LV  dysf.distal LAD 90%,70-75% mid lad just before prev stent,SUSAN to prox.mid LAD, endeavor to distal LAD     ANGIOGRAM  11/13/13    restenosis, stent LAD     BACK SURGERY      back surgery x3     CV CORONARY ANGIOGRAM N/A 7/8/2019    Procedure: Coronary Angiogram;  Surgeon: Jose Alfredo Crockett MD;  Location:  HEART CARDIAC CATH LAB     CV LEFT HEART CATH N/A 7/8/2019    Procedure: Left Heart Cath;  Surgeon: Jose Alfredo Crockett MD;  Location:  HEART CARDIAC CATH LAB     CV PCI ASPIRATION THROMECTOMY N/A 7/8/2019    Procedure: PCI Aspiration Thrombectomy;  Surgeon: Jose Alfredo Crockett MD;  Location:  HEART CARDIAC CATH LAB     CV PCI STENT DRUG ELUTING N/A 7/8/2019    Procedure: PCI Stent Drug Eluting;  Surgeon: Jose Alfredo Crockett MD;  Location:  HEART CARDIAC CATH LAB     HEART CATH LEFT HEART CATH  06/13/2017    SUSAN to OM3     INCISION AND DRAINAGE TOE, COMBINED Bilateral 9/11/2018    Procedure: COMBINED INCISION AND DRAINAGE TOE;  1) Irrigation and debridement ulcer left great toe  2) Amputation 3rd toe right foot at distal interphalangeal joint level;  Surgeon: Miki Harp MD;  Location:  OR     IRRIGATION AND DEBRIDEMENT FOOT, COMBINED Left 3/12/2019    Procedure: Debridement of left foot ulcer sub first MPJ 2 x 2.5 cm down to and including subcutaneous tissue, callus debridement for preulcerative lesion, left second toe.;  Surgeon: Ryan Bhagat DPM;  Location:  OR     ORTHOPEDIC SURGERY      bunion surgery both feet     ORTHOPEDIC SURGERY      left shoulder     SOFT TISSUE SURGERY      debridement of toe numerous time     VASCULAR SURGERY      7 stents in heart     [facility] NONSPECIFIC PROCEDURE      Laminectomy x 3 - (1983 x 2 & 1990)     [facility] NONSPECIFIC PROCEDURE Bilateral 1998    Bunionectomy     [facility] NONSPECIFIC PROCEDURE  1959    Gingival surgery at age 9       Social History   I have reviewed this patient's social history and updated it with pertinent information if needed.  Social  History     Tobacco Use     Smoking status: Former Smoker     Packs/day: 1.00     Years: 25.00     Pack years: 25.00     Types: Cigarettes     Start date:      Quit date: 2005     Years since quitting: 15.7     Smokeless tobacco: Never Used   Substance Use Topics     Alcohol use: Yes     Alcohol/week: 4.0 standard drinks     Types: 4 Shots of liquor per week     Frequency: Never     Comment: OCCASSIONALLY      Drug use: No     Smoke: Not currently, did smoke 1 pack day for 30 years   Alcohol: Occasionally   Drugs: Denies   Living: Lives with wife and grandkids       Family History   I have reviewed this patient's family history and updated it with pertinent information if needed.   I have reviewed this patient's family history and updated it with pertinent information if needed.  Family History   Problem Relation Age of Onset     Cancer - colorectal Sister      Thyroid Disease Brother      Cardiovascular Brother      Diabetes Brother      Cancer Father         tumor in chest and throat     Arthritis Mother      Thyroid Disease Mother      Diabetes Mother      Glaucoma No family hx of      Macular Degeneration No family hx of      Father: No heart disease,  of lung cancer and head/neck cancer. Passed at age 59.   Mother: Cirrhosis. No heart disease.     Prior to Admission Medications   Prior to Admission Medications   Prescriptions Last Dose Informant Patient Reported? Taking?   Cadexomer Iodine, topical, 0.9% (IODOSORB) 0.9 % GEL gel   No No   Sig: Apply to ulcer on left foot every other day with gauze dressing.   Continuous Blood Gluc  (FREESTYLE CLARITA 2 READER SYSTM) DRAGAN   No No   Si Device daily   Continuous Blood Gluc Sensor (FREESTYLE CLARITA 2 SENSOR SYSTM) MISC   No No   Si Units 4 times daily (before meals and nightly)   apixaban ANTICOAGULANT (ELIQUIS) 5 MG tablet 2021 at Unknown time  No Yes   Sig: Take 1 tablet (5 mg) by mouth 2 times daily   atorvastatin (LIPITOR) 40 MG  tablet 4/6/2021 at Unknown time  No Yes   Sig: Take 1 tablet (40 mg) by mouth every evening   carvedilol (COREG) 25 MG tablet 4/7/2021 at Unknown time  No Yes   Sig: Take 1 tablet (25 mg) by mouth 2 times daily (with meals)   clopidogrel (PLAVIX) 75 MG tablet 4/7/2021 at Unknown time  No Yes   Sig: Take 1 tablet (75 mg) by mouth daily   furosemide (LASIX) 40 MG tablet 4/7/2021 at Unknown time  No Yes   Sig: Take 1 tablet (40 mg) by mouth daily   gabapentin (NEURONTIN) 300 MG capsule 4/6/2021 at Unknown time  Yes Yes   Sig: Take 300 mg by mouth At Bedtime   gabapentin (NEURONTIN) 300 MG capsule 4/6/2021 at Unknown time  Yes Yes   Sig: Take 300 mg by mouth nightly as needed In addition to scheduled dose.   insulin aspart (NOVOLOG FLEXPEN) 100 UNIT/ML pen 4/7/2021 at Unknown time  Yes Yes   Sig: Inject Subcutaneous 3 times daily (with meals) Sliding Scale  Do not give sliding scale insulin if Pre-Meal BG less than 140.   For Pre-Meal:   - 200 give 2 units.    - 250 give 4 units.    - 300 give 6 units.    - 350 give 8 units.    - 400 give 10 units.   insulin aspart (NOVOLOG PEN) 100 UNIT/ML pen 4/7/2021 at Unknown time  No Yes   Sig: Inject 12 Units Subcutaneous 2 times daily (with meals) With breakfast and lunch   insulin aspart (NOVOLOG PEN) 100 UNIT/ML pen 4/7/2021 at Unknown time  No Yes   Sig: Inject 14 Units Subcutaneous daily (with dinner)   insulin glargine (LANTUS PEN) 100 UNIT/ML pen 4/7/2021 at Unknown time  Yes Yes   Sig: Inject 43-45 Units Subcutaneous every morning   insulin pen needle (31G X 6 MM) 31G X 6 MM miscellaneous   No No   Sig: Use  as directed.   isosorbide mononitrate (IMDUR) 30 MG 24 hr tablet 4/7/2021 at Unknown time  No Yes   Sig: Take 1 tablet (30 mg) by mouth daily   levothyroxine (SYNTHROID, LEVOTHROID) 137 MCG tablet 4/6/2021 at Unknown time Self Yes Yes   Sig: Take 137 mcg by mouth every evening    nitroglycerin (NITROSTAT) 0.4 MG sublingual tablet Unknown  at Unknown time Self No No   Sig: Place 1 tablet (0.4 mg) under the tongue every 5 minutes as needed for chest pain   Patient not taking: Reported on 2/9/2021   order for DME  Self No No   Sig: Equipment being ordered: Walker Wheels () and Walker ()  Treatment Diagnosis: Impaired gait, heel WB bilaterally.   Patient not taking: Reported on 2/9/2021   order for DME   No No   Sig: Equipment being ordered: size 12 surgical shoe   pantoprazole (PROTONIX) 40 MG EC tablet 4/7/2021 at Unknown time  No Yes   Sig: TAKE 1 TABLET BY MOUTH EVERY MORNING BEFORE BREAKFAST      Facility-Administered Medications: None     Allergies   Allergies   Allergen Reactions     No Known Allergies        Physical Exam   Vital Signs: Temp: 98.6  F (37  C) Temp src: Oral BP: (!) 146/90 Pulse: 70   Resp: 18 SpO2: 98 % O2 Device: None (Room air)    Weight: 0 lbs 0 oz    General: well-appearing, in no acute distress, on room air   HEENT: NC/AT, EOMI, PERRL  CVS: irregularly irregular rhythm   Resp: crackles present bilaterally   Abd: distended, normoactive bowel sounds, nontender, no rebound tenderness, no rigidity or guarding   Musc: no gross joint abnormalities, strength 5+ bilaterally in upper and lower extremities  Extremities: 3+ pitting edema bilaterally, chronic LLE diabetic ulcer   Skin: chronic diabetic ulcer on LLE   Neuro: alert and oriented x4, no gross neurological deficits, patellar reflexes 2+, no gait abnormalities       Data     4/7/21: Chest Xray     FINDINGS: AP radiograph of the chest. The cardiomediastinal silhouette  is enlarged. Low lung volumes. Coronary artery stents. Pulmonary  vasculature is indistinct. Diffuse interstitial predominant opacities.  The left costophrenic angle is collimated out of view. No significant  pleural effusion. No pneumothorax. Visualized upper abdomen is  unremarkable. Multiple chronic appearing left-sided rib fractures. .                                                                       IMPRESSION:  1. Diffuse interstitial prominent opacities likely representing  pulmonary edema.  2. Cardiomegaly.    4/8/21: EKG         1/3/2021: Echo     Interpretation Summary     The visual ejection fraction is estimated at 30-35%.  Left ventricular systolic function is moderately reduced.  There are regional wall motion abnormalities as specified.  The study was technically difficult. Compared to prior study, there is no  significant change.  _____________________________________________________________________________  __        Left Ventricle  The left ventricle is normal in size. There is normal left ventricular wall  thickness. Diastolic Doppler findings (E/E' ratio and/or other parameters)  suggest left ventricular filling pressures are increased. The visual ejection  fraction is estimated at 30-35%. Left ventricular systolic function is  moderately reduced. Anteroseptal akinesis. Only the basal anterospetum  contracts normally. The distal inferoseptum is akinetic. distal inferior wall  akinesis. Distal anterior akinesis. Distal inferolateral akinesis. There is  apical akinesis. Septal motion is consistent with conduction abnormality.  There are regional wall motion abnormalities as specified.     Right Ventricle  The right ventricle is normal size. Mildly decreased right ventricular  systolic function.     Atria  The left atrium is mildly dilated. The right atrium is mildly dilated. There  is no color Doppler evidence of an atrial shunt.     Mitral Valve  There is trace mitral regurgitation.        Tricuspid Valve  There is trace tricuspid regurgitation. The right ventricular systolic  pressure is approximated at 16.7 mmHg plus the right atrial pressure.     Aortic Valve  There is trace aortic regurgitation. No hemodynamically significant valvular  aortic stenosis.     Pulmonic Valve  There is trace pulmonic valvular regurgitation.     Vessels  The aortic root is normal size. IVC diameter <2.1 cm  collapsing >50% with  sniff suggests a normal RA pressure of 3 mmHg.     Pericardium  There is no pericardial effusion.        Rhythm  The rhythm was atrial fibrillation.  _____________________________________________________________________________  __  MMode/2D Measurements & Calculations  IVSd: 1.1 cm     LVIDd: 4.9 cm  LVIDs: 3.5 cm  LVPWd: 1.1 cm  FS: 28.5 %  LV mass(C)d: 196.9 grams  LV mass(C)dI: 79.9 grams/m2  Ao root diam: 3.3 cm  LA Volume (BP): 83.6 ml  LA Volume Index (BP): 34.0 ml/m2  RWT: 0.44        Doppler Measurements & Calculations  MV E max john: 92.8 cm/sec     MV max P.7 mmHg  MV mean P.2 mmHg  MV V2 VTI: 22.2 cm  MV dec time: 0.22 sec  AI P1/2t: 594.7 msec  TR max john: 204.1 cm/sec  TR max P.7 mmHg  E/E' av.8  Lateral E/e': 12.4  Medial E/e': 15.3    20: NM Stress Test     Conclusion          The nuclear stress test is abnormal.     Left ventricular function is moderately reduced, EF 37%.  There is akinesis of the mid to distal anterior, anteroseptal and apical walls.     There is a medium sized area of a severe degree of infarction in the mid to distal anterior, apical and anteroseptal segment(s) of the left ventricle.  No ischemia is identified.     In comparison with the prior study, no significant change.     19: Right Heart Cath

## 2021-04-08 NOTE — CONSULTS
[facility] Nurse Inpatient Wound Assessment   Reason for consultation: Evaluate and treat  Left lower extremity wounds    Assessment  Left foot wounds due to Neuropathic Ulcer  Status: initial assessment    Treatment Plan  Left foot wounds: Daily    Cleanse calloused areas with Povidone iodine swabs ([id number])   Leave open to air.  Patient may wear socks, and should wear foot orthotic whenever he is up.       Orders Written  Recommended provider order: None, at this time and follow up with outpatient podiatrist when discharged.   [facility] Nurse follow-up plan:signing off  Nursing to notify the Provider(s) and re-consult the [facility] Nurse if wound(s) deteriorates or new skin concern.    Patient History  According to provider note(s):  Jayro Elder is a 70 year old male with PMH CAD with multiple stents (10 stents placed, last stent placed within 3 years per patient), HFrEF (30-35%), ICM, pAfib on Apixaban, multiple strokes (x4 per patient), DM2 c/b neuropathy and LLE ulcer, MARISOL, hypothyroidism, GERD who presents with complaints of chest pain and dyspnea. Patient states that his dyspnea began about one week ago, and seems to have worsened after his second COVID vaccine. He is increasingly fatigued and notices worsening dyspnea particularly with movement. He has also noticed 20 pound weight gain, dry weight is around 247-250, currently weighs 267 lbs. He also has worsening lower extremity edema bilaterally as well as increased abdominal girth from bloating. He denies orthopnea, but mentions that he often sleeps on his side. Endorses PND every night. States he takes his medications as prescribed. He was recently hospitalized on 1/3/21 to 1/4/21 with complaints of light headedness/orthostatic hypotension and was told to decreased his Lasix from 40mg to 20mg, however he states that he did not make this medication adjustment and has been taking Lasix 40 mg every day. He tries to avoid salt in his diet, weighs himself daily, and drinks  less than 2 L each day.      In addition, he endorses chest pain of few days duration. Pain is mostly located on his left chest wall, intermittent, 3-5/10 when it occurs, without radiation towards back/jaw/down arms. Troponin has been negative, EKG without ST elevation or depression. Initially requested nitroglycerine for his chest pain, but pain was gone before it was administered. Pain occurs at rest and is not related to exertion.      ROS positive for abdominal pain which is constant and about a 3/10. Related to increased abdominal girth.     Objective Data  Active Diet Order  Orders Placed This Encounter      2 Gram Sodium Diet      Output:   I/O last 3 completed shifts:  In: 420 [P.O.:420]  Out: 4600 [Urine:4600]    Risk Assessment:   Sensory Perception: 4-->no impairment  Moisture: 4-->rarely moist  Activity: 3-->walks occasionally  Mobility: 3-->slightly limited  Nutrition: 3-->adequate  Friction and Shear: 3-->no apparent problem  Wilmer Score: 20                          Labs:   Recent Labs   Lab 04/08/21  1215 04/08/21  0426 04/08/21  0243   ALBUMIN  --   --  3.7   HGB  --   --  10.2*   INR  --  1.39*  --    WBC  --   --  4.5   A1C 8.1*  --   --        Physical Exam  Areas of skin assessed: focused left foot      Left first metatarsal head 4/8    Left 3rd toe 4/8    Wound History: Present on admission, patient sees a Dr. Mckeon from podiatry in the outpatient setting. No obviously open areas noted at this time, appears primarily as large calluses    Wound Base: 100 % epidermis and hyperkeratosis     Palpation of the wound bed: firm      Drainage: none     Description of drainage: none     Measurements (length x width x depth, in cm) 4 x 4 x 0 cm region on first metatarsal head, and 1 x 0.5 cm on 3rd toe     Tunneling N/A     Undermining N/A  Periwound skin: intact      Color: normal and consistent with surrounding tissue      Temperature: normal   Odor: none  Pain: no grimacing or signs of discomfort,  none    Interventions  Visual inspection and assessment completed   Wound Care Rationale Protect periwound skin and Provide protection   Wound Care: done per plan of care  Supplies: placed at the bedside, floor stock and discussed with patient  Current off-loading measures: Pillows and foot orthotic  Current support surface: Standard  Atmos Air mattress  Education provided to: plan of care and wound progress  Discussed plan of care with Patient    Savannah Liu RN, CWOCN

## 2021-04-08 NOTE — PROGRESS NOTES
04/08/21 1100   Quick Adds   Type of Visit Initial Occupational Therapy Evaluation       Present no   Language english   Living Environment   People in home spouse;grandchild(ernesto)  (4 and 7 years old)   Current Living Arrangements house   Home Accessibility stairs to enter home;stairs within home   Number of Stairs, Main Entrance 1   Number of Stairs, Within Home, Primary other (see comments)  (full flight, however pt uses a chair lift)   Transportation Anticipated family or friend will provide   Living Environment Comments Pt lives in a house with his wife and 2 grandchildren. Pt has a chair lift within the home to get to bedroom and bathroom. Pt has a walk in shower in the bathroom   Self-Care   Usual Activity Tolerance moderate   Current Activity Tolerance moderate   Regular Exercise No   Equipment Currently Used at Home cane, quad;orthosis;walker, rolling;lift device   Activity/Exercise/Self-Care Comment Pt was previously mod I with ADL completion. Pt has a cane and walker available, however only uses it for community mobility   Disability/Function   Hearing Difficulty or Deaf no   Wear Glasses or Blind yes   Vision Management glasses   Concentrating, Remembering or Making Decisions Difficulty no   Difficulty Communicating no   Difficulty Eating/Swallowing no   Walking or Climbing Stairs Difficulty yes   Walking or Climbing Stairs stair climbing difficulty, dependent   Mobility Management uses cane or 4WW for community mobility only   Dressing/Bathing Difficulty no   Toileting issues no   Doing Errands Independently Difficulty (such as shopping) yes   Errands Management Pt wife assists with IADL completion   Fall history within last six months no   Change in Functional Status Since Onset of Current Illness/Injury yes   General Information   Onset of Illness/Injury or Date of Surgery 04/07/21   Referring Physician Kevan Borja MD   Patient/Family Therapy Goal Statement (OT)  To return home   Additional Occupational Profile Info/Pertinent History of Current Problem Jayro Elder is a 70 year old male with PMH CAD with multiple stents (10 stents placed, last stent placed within 3 years per patient), HFrEF (30-35%), ICM, HTN, pAfib on Apixaban, multiple strokes (x4 per patient), DM2 c/b neuropathy, MARISOL, hypothyroidism, chronic diabetic foot ulcer of LLE, GERD who presents with complaints of chest pain and dyspnea.    Existing Precautions/Restrictions cardiac   Left Upper Extremity (Weight-bearing Status) full weight-bearing (FWB)   Right Upper Extremity (Weight-bearing Status) full weight-bearing (FWB)   Left Lower Extremity (Weight-bearing Status) full weight-bearing (FWB)   Right Lower Extremity (Weight-bearing Status) full weight-bearing (FWB)   Heart Disease Risk Factors Lack of physical activity;Overweight;Medical history   General Observations and Info Activity: up ad nirali   Cognitive Status Examination   Orientation Status orientation to person, place and time   Affect/Mental Status (Cognitive) WNL   Follows Commands WNL   Cognitive Status Comments Pt is alert, oriented and appropriate in conversation   Visual Perception   Visual Impairment/Limitations WNL   Sensory   Sensory Quick Adds No deficits were identified   Pain Assessment   Patient Currently in Pain No   Integumentary/Edema   Integumentary/Edema Comments Pt with slight edema in BLE   Range of Motion Comprehensive   General Range of Motion no range of motion deficits identified   Comment, General Range of Motion BUE ROM WFL   Strength Comprehensive (MMT)   General Manual Muscle Testing (MMT) Assessment no strength deficits identified   Comment, General Manual Muscle Testing (MMT) Assessment BUE grossly 5/5   Bed Mobility   Bed Mobility supine-sit;sit-supine   Supine-Sit [place] (Bed Mobility) supervision   Sit-Supine [place] (Bed Mobility) supervision   Transfers   Transfers sit-stand transfer   Sit-Stand  Transfer   Sit-Stand [place] (Transfers) supervision   Activities of Daily Living   BADL Assessment/Intervention lower body dressing;toileting   Lower Body Dressing Assessment/Training   [place] Level (Lower Body Dressing) supervision   Toileting   [place] Level (Toileting) supervision   Instrumental Activities of Daily Living (IADL)   Previous Responsibilities yardwork;medication management;finances   IADL Comments Pt and spouse share IADL responsibiltiies. Pt spouse completes most of the meal prep, cleaning, laundry, etc   Clinical Impression   Criteria for Skilled Therapeutic Interventions Met (OT) yes;meets criteria;skilled treatment is necessary   OT Diagnosis decreased activity tolerance and independence with IADL completion   OT Problem List-Impairments impacting ADL activity tolerance impaired;strength   Assessment of Occupational Performance 1-3 Performance Deficits   Identified Performance Deficits functional mobility, bathing, IADL completion   Planned Therapy Interventions (OT) IADL retraining;ADL retraining;strengthening;home program guidelines;progressive activity/exercise   Clinical Decision Making Complexity (OT) low complexity   Therapy Frequency (OT) 4x/week   Predicted Duration of Therapy 1 week   Anticipated Equipment Needs Upon Discharge (OT) other (see comments)   Risk & Benefits of therapy have been explained evaluation/treatment results reviewed;care plan/treatment goals reviewed;risks/benefits reviewed;current/potential barriers reviewed;participants voiced agreement with care plan;participants included;patient   OT Discharge Planning    OT Discharge Recommendation (DC Rec) Home with assist   OT Rationale for DC Rec Pt is near baseline for ADL completion and functional mobility. Pt would benefit from continued skilled therapy while IP to increase activity tolerance and endurance   OT Brief overview of current status  CGA for ambulation   Total Evaluation Time (Minutes)   Total  Evaluation Time (Minutes) 7

## 2021-04-08 NOTE — PLAN OF CARE
Neuro: A&Ox4.   Cardiac: Afib HR 70-80s VSS.   Respiratory: Sating mid 90s on RA.  GI/: Adequate urine output. No BM  Diet/appetite: Tolerating 2g Na diet. 2L FR.  Activity:  SBA within room.  Pain: Pt had one episode of chest pain when arriving to the floor, pain subsided prior to any intervention happening. At acceptable level on current regimen.   Skin: Left foot chronic diabetic ulcer.  LDA's: R PIV-saline locked.     Plan: Continue with POC. Notify primary team with changes.

## 2021-04-08 NOTE — ED TRIAGE NOTES
PT reports a few days of SOB, JENNA and CP that comes and goes. Had 2nd COVID vaccine yesterday and feels symptoms worsened since.

## 2021-04-08 NOTE — PLAN OF CARE
Neuro: A&Ox4.   Cardiac: A-fib with HR in 70s-100. BP 110s-140s/80s.  BP dropped after ambulating in hallway with therapy to 80s/60s; team notified.  Pt reported feeling dizzy at the time.  Recovered after lying down.  No chest pain reported to RN this shift.  Respiratory: Sating >92% on RA.  GI/: Adequate urine output. Last BM 4/6.  Diet/appetite: Tolerating 2gm Na2+ diet with 2L FR. Denies nausea.  Activity:  SBA with GB d/t dizzyness and BP drop earlier.  Ambulating to BR.    Pain: Denies pain.  Skin: Iodine to left foot ulcer.  LDA's: Right PIV infusing iron.     NPO at midnight for angiogram 4/9.    Plan: Continue with POC. Notify primary team with changes.

## 2021-04-08 NOTE — PROGRESS NOTES
[facility]   Cardiology Consults  Progress Note     Interval History:  - brief chest discomfort overnight, resolved without intervention  - great urine output with IV lasix. Continues to have significant LE edema  - this AM reports breathing is comfortable at rest. He denies active chest pain, fever, chills, abdominal pain, hematuria, hematochezia or melena.       Changes today:  - continue IV lasix  - hold apixaban  - start medium dose SSI  - stop captopril, start lisinopril  - NPO MN    Physical Exam:  Temp:  [97.5  F (36.4  C)-98.6  F (37  C)] 97.6  F (36.4  C)  Pulse:  [68-88] 79  Resp:  [14-20] 14  BP: (126-161)/() 146/93  SpO2:  [95 %-100 %] 98 %      Intake/Output Summary (Last 24 hours) at 4/8/2021 1109  Last data filed at 4/8/2021 0929  Gross per 24 hour   Intake 420 ml   Output 5175 ml   Net -4755 ml         Wt:   Wt Readings from Last 5 Encounters:   04/08/21 121.5 kg (267 lb 13.7 oz)   02/17/21 117.5 kg (259 lb)   02/09/21 117.5 kg (259 lb 1.6 oz)   01/03/21 112.4 kg (247 lb 11.2 oz)   12/22/20 117 kg (258 lb)       GEN: NAD, awake, alert, pleasant  Pulm: CTAB, no wheeze or crackles  Cardiac: irregular, normal rate, JVP difficult to evaluate due to body habitus, no murmurs  Vascular: 2+ bilateral lower extremity edema and palpable pulses  GI: soft, non distended  Neuro: CN II-XII grossly intact    Medications:    [Held by provider] apixaban ANTICOAGULANT  5 mg Oral BID     aspirin  81 mg Oral Daily     atorvastatin  40 mg Oral QPM     carvedilol  25 mg Oral BID w/meals     clopidogrel  75 mg Oral Daily     furosemide  40 mg Intravenous BID     gabapentin  300 mg Oral At Bedtime     insulin glargine  20 Units Subcutaneous QAM     isosorbide mononitrate  30 mg Oral Daily     levothyroxine  137 mcg Oral QPM     lisinopril  10 mg Oral Daily     pantoprazole  40 mg Oral Daily     sodium chloride (PF)  10 mL Intravenous Once       - MEDICATION INSTRUCTIONS -       -  MEDICATION INSTRUCTIONS -       - MEDICATION INSTRUCTIONS -         Labs:   CMP  Recent Labs   Lab 04/08/21  0426 04/08/21  0243 04/07/21  2014    138 137   POTASSIUM 3.3* 3.4 4.0   CHLORIDE 101 101 101   CO2 29 31 29   ANIONGAP 6 6 7   * 137* 179*   BUN 24 24 22   CR 1.27* 1.36* 1.45*   GFRESTIMATED 57* 52* 48*   GFRESTBLACK 66 60* 56*   BETTIE 9.0 8.9 8.9   MAG 2.0 1.9  --    PROTTOTAL  --  6.8 6.7*   ALBUMIN  --  3.7 3.6   BILITOTAL  --  1.4* 1.3   ALKPHOS  --  109 108   AST  --  14 15   ALT  --  30 30     CBC  Recent Labs   Lab 04/08/21 0243 04/07/21 2014   WBC 4.5 5.3   RBC 3.59* 3.55*   HGB 10.2* 10.3*   HCT 31.5* 31.7*   MCV 88 89   MCH 28.4 29.0   MCHC 32.4 32.5   RDW 13.5 13.5    203     INR  Recent Labs   Lab 04/08/21  0426   INR 1.39*     Arterial Blood GasNo lab results found in last 7 days.    Diagnostics:  Afib, bifascicular block (appears old)      ASSESSMENT/PLAN:  #Atypical Chest Pain  #CAD w/ hx of multiple stent placements   #Uncontrolled Hypertension   #ICM  Presents with chest pain of few days duration, mainly left sided chest pain. Intermittent and only lasting a few seconds. Patient initially requested nitroglycerin for chest pain, but pain was resolved prior to administration. Last angiogram in 2019 100% stenosis of RCA s/p PCI, and patient OM and LAD stents. Pain appears to be primarily exertional, and similar to prior episodes of angina. Given extensive history, will pursue angiogram prior to discharge. Will need diuresis first. Troponin negative x3, EKG as above.  - discontinue captopril, start lisinopril 10mg  - Trop x3 WNL   - On telemetry   - continue carvedilol 25mg bid  - continue atorvastatin 40mg  - continue imdur 30mg   - continue plavix  - start aspirin 81mg while holding apixaban (is on chronic plavix at baseline)  - NPO MN      #Acute on Chronic HFrEF Exacerbation (EF 30-35%)   #pAfib on Apixaban   Approximately 20 pound weight gain with significant dyspnea  "and nightly PND. 3+ pitting edema bilaterally. BNP elevated at 1292. Patient was recently hospitalized in January for orthostatic hypotension and instructed to decrease his PTA Lasix from 40 mg to 20 mg, however he states that he did not do this and has been taking Lasix 40 mg daily. Responding well to IV lasix.  - hold apixaban as above  - BB: coreg as above   - ACEi/ARB/ARNI: lisinopril as above  - Aldosterone antagonist: None   - Diuretic: lasix 40mg IV bid, bid BMP while diuresing  - Daily Weights, Strict I/O's  - Low Na Diet  - 2L Fluid Restriction     #Hx of Multiple Strokes   - PTA Plavix, statin     #ABNER on CKD 3   #Cardiorenal Syndrome   Cr elevated at 1.45 on admission. Baseline around 1.2. Likely cardiorenal related to volume overloaded state. Improving with diuresis.   - Avoid nephrotoxic agents   - BMP BID   - Electrolyte replacement PRN      #DM2 c/b neuropathy and LLE diabetic ulcer   - PTA Insulin Glargine 43-45 units QAM decreased to 20 units in setting of patient being NPO   - med dose sliding scale insulin   - hypoglycemia protocol   - WOC consult for chronic LLE diabetic ulcer      #HLD   - PTA Atorvastatin      #Hypothyroidism   TSH WNL   - PTA Levothyroxine      #GERD   - PTA Pantoprazole     #normocytic anemia  Hgb 10 on admission. Unclear cause, no signs of bleeding. Last colonoscopy was \"a long time ago\".   - iron studies, ferritin add on to labs     Diet: NPO for possible stress test vs cath   DVT Prophylaxis: PCD   Taylor Catheter: not present  Code Status: FULL   Fluids: None   Lines: PIV     Patient seen and discussed with Dr. Bell, who agrees with above plan.      Davion Perez PA-C  [facility] Cardiology Team        "

## 2021-04-08 NOTE — PRE-PROCEDURE
GENERAL PRE-PROCEDURE:   Procedure:  Coronary angiogram  Date/Time:  4/8/2021 4:15 PM    Verbal consent obtained?: Yes    Written consent obtained?: Yes    Risks and benefits: Risks, benefits and alternatives were discussed    Consent given by:  Patient  Patient states understanding of procedure being performed: Yes    Patient's understanding of procedure matches consent: Yes    Procedure consent matches procedure scheduled: Yes    Appropriately NPO:  Yes  ASA Class:  Class 3- Severe systemic disease, definite functional limitations  Mallampati  :  Grade 2- soft palate, base of uvula, tonsillar pillars, and portion of posterior pharyngeal wall visible  Lungs:  Lungs clear with good breath sounds bilaterally  Heart:  Normal heart sounds and rate  History & Physical reviewed:  History and physical reviewed and no updates needed  Statement of review:  I have reviewed the lab findings, diagnostic data, medications, and the plan for sedation        Davion Perez PA-C  [facility] Cardiology Team

## 2021-04-08 NOTE — PROGRESS NOTES
Transfer  Transferred from: ED  Via:bed  Reason for transfer: Pt appropriate for 6B  Family: Aware of transfer  Belongings: Received with pt  Chart: Received with pt  Medications:   Code Status verified on armband: no  2 RN Skin Assessment Completed By: Marilyn PIERCE and Mirza FUNEZ  Med rec completed: yes  Bed surface reassessed with algorithm and charted: yes  New bed surface ordered: no  Suction/Ambu bag/Flowmeter at bedside: yes    Report received from: Sarai  Pt status: stable

## 2021-04-09 ENCOUNTER — RESEARCH ENCOUNTER (OUTPATIENT)
Dept: CARDIOLOGY | Facility: CLINIC | Age: 71
End: 2021-04-09

## 2021-04-09 ENCOUNTER — APPOINTMENT (OUTPATIENT)
Dept: OCCUPATIONAL THERAPY | Facility: CLINIC | Age: 71
DRG: 286 | End: 2021-04-09
Payer: COMMERCIAL

## 2021-04-09 LAB
ANION GAP SERPL CALCULATED.3IONS-SCNC: 6 MMOL/L (ref 3–14)
ANION GAP SERPL CALCULATED.3IONS-SCNC: 7 MMOL/L (ref 3–14)
BUN SERPL-MCNC: 27 MG/DL (ref 7–30)
BUN SERPL-MCNC: 28 MG/DL (ref 7–30)
CALCIUM SERPL-MCNC: 9.4 MG/DL (ref 8.5–10.1)
CALCIUM SERPL-MCNC: 9.9 MG/DL (ref 8.5–10.1)
CHLORIDE SERPL-SCNC: 100 MMOL/L (ref 94–109)
CHLORIDE SERPL-SCNC: 101 MMOL/L (ref 94–109)
CO2 SERPL-SCNC: 28 MMOL/L (ref 20–32)
CO2 SERPL-SCNC: 29 MMOL/L (ref 20–32)
CREAT SERPL-MCNC: 1.29 MG/DL (ref 0.66–1.25)
CREAT SERPL-MCNC: 1.33 MG/DL (ref 0.66–1.25)
GFR SERPL CREATININE-BSD FRML MDRD: 53 ML/MIN/{1.73_M2}
GFR SERPL CREATININE-BSD FRML MDRD: 56 ML/MIN/{1.73_M2}
GLUCOSE BLDC GLUCOMTR-MCNC: 152 MG/DL (ref 70–99)
GLUCOSE BLDC GLUCOMTR-MCNC: 208 MG/DL (ref 70–99)
GLUCOSE BLDC GLUCOMTR-MCNC: 229 MG/DL (ref 70–99)
GLUCOSE BLDC GLUCOMTR-MCNC: 246 MG/DL (ref 70–99)
GLUCOSE SERPL-MCNC: 182 MG/DL (ref 70–99)
GLUCOSE SERPL-MCNC: 233 MG/DL (ref 70–99)
INR PPP: 1.36 (ref 0.86–1.14)
KCT BLD-ACNC: 302 SEC (ref 75–150)
MAGNESIUM SERPL-MCNC: 2.1 MG/DL (ref 1.6–2.3)
POTASSIUM SERPL-SCNC: 3.8 MMOL/L (ref 3.4–5.3)
POTASSIUM SERPL-SCNC: 4.4 MMOL/L (ref 3.4–5.3)
SODIUM SERPL-SCNC: 135 MMOL/L (ref 133–144)
SODIUM SERPL-SCNC: 136 MMOL/L (ref 133–144)

## 2021-04-09 PROCEDURE — 99232 SBSQ HOSP IP/OBS MODERATE 35: CPT | Mod: 25 | Performed by: INTERNAL MEDICINE

## 2021-04-09 PROCEDURE — 83735 ASSAY OF MAGNESIUM: CPT | Performed by: STUDENT IN AN ORGANIZED HEALTH CARE EDUCATION/TRAINING PROGRAM

## 2021-04-09 PROCEDURE — 250N000011 HC RX IP 250 OP 636: Performed by: INTERNAL MEDICINE

## 2021-04-09 PROCEDURE — 250N000013 HC RX MED GY IP 250 OP 250 PS 637: Performed by: PHYSICIAN ASSISTANT

## 2021-04-09 PROCEDURE — 120N000003 HC R&B IMCU UMMC

## 2021-04-09 PROCEDURE — 99153 MOD SED SAME PHYS/QHP EA: CPT | Performed by: INTERNAL MEDICINE

## 2021-04-09 PROCEDURE — 99152 MOD SED SAME PHYS/QHP 5/>YRS: CPT | Performed by: INTERNAL MEDICINE

## 2021-04-09 PROCEDURE — C1769 GUIDE WIRE: HCPCS | Performed by: INTERNAL MEDICINE

## 2021-04-09 PROCEDURE — 80048 BASIC METABOLIC PNL TOTAL CA: CPT | Performed by: PHYSICIAN ASSISTANT

## 2021-04-09 PROCEDURE — C1887 CATHETER, GUIDING: HCPCS | Performed by: INTERNAL MEDICINE

## 2021-04-09 PROCEDURE — 4A033BC MEASUREMENT OF ARTERIAL PRESSURE, CORONARY, PERCUTANEOUS APPROACH: ICD-10-PCS | Performed by: INTERNAL MEDICINE

## 2021-04-09 PROCEDURE — 999N001017 HC STATISTIC GLUCOSE BY METER IP

## 2021-04-09 PROCEDURE — 36415 COLL VENOUS BLD VENIPUNCTURE: CPT | Performed by: PHYSICIAN ASSISTANT

## 2021-04-09 PROCEDURE — 97110 THERAPEUTIC EXERCISES: CPT | Mod: GO | Performed by: OCCUPATIONAL THERAPIST

## 2021-04-09 PROCEDURE — 93571 IV DOP VEL&/PRESS C FLO 1ST: CPT | Performed by: INTERNAL MEDICINE

## 2021-04-09 PROCEDURE — B2111ZZ FLUOROSCOPY OF MULTIPLE CORONARY ARTERIES USING LOW OSMOLAR CONTRAST: ICD-10-PCS | Performed by: INTERNAL MEDICINE

## 2021-04-09 PROCEDURE — 250N000009 HC RX 250: Performed by: INTERNAL MEDICINE

## 2021-04-09 PROCEDURE — 258N000003 HC RX IP 258 OP 636: Performed by: PHYSICIAN ASSISTANT

## 2021-04-09 PROCEDURE — 93454 CORONARY ARTERY ANGIO S&I: CPT | Performed by: INTERNAL MEDICINE

## 2021-04-09 PROCEDURE — 36415 COLL VENOUS BLD VENIPUNCTURE: CPT | Performed by: STUDENT IN AN ORGANIZED HEALTH CARE EDUCATION/TRAINING PROGRAM

## 2021-04-09 PROCEDURE — 85610 PROTHROMBIN TIME: CPT | Performed by: STUDENT IN AN ORGANIZED HEALTH CARE EDUCATION/TRAINING PROGRAM

## 2021-04-09 PROCEDURE — 272N000001 HC OR GENERAL SUPPLY STERILE: Performed by: INTERNAL MEDICINE

## 2021-04-09 PROCEDURE — 250N000013 HC RX MED GY IP 250 OP 250 PS 637: Performed by: STUDENT IN AN ORGANIZED HEALTH CARE EDUCATION/TRAINING PROGRAM

## 2021-04-09 PROCEDURE — 250N000011 HC RX IP 250 OP 636: Performed by: PHYSICIAN ASSISTANT

## 2021-04-09 PROCEDURE — C1894 INTRO/SHEATH, NON-LASER: HCPCS | Performed by: INTERNAL MEDICINE

## 2021-04-09 PROCEDURE — 255N000002 HC RX 255 OP 636: Performed by: INTERNAL MEDICINE

## 2021-04-09 PROCEDURE — 85347 COAGULATION TIME ACTIVATED: CPT

## 2021-04-09 PROCEDURE — 80048 BASIC METABOLIC PNL TOTAL CA: CPT | Performed by: STUDENT IN AN ORGANIZED HEALTH CARE EDUCATION/TRAINING PROGRAM

## 2021-04-09 RX ORDER — ARGATROBAN 1 MG/ML
150 INJECTION, SOLUTION INTRAVENOUS
Status: DISCONTINUED | OUTPATIENT
Start: 2021-04-09 | End: 2021-04-09 | Stop reason: HOSPADM

## 2021-04-09 RX ORDER — FENTANYL CITRATE 50 UG/ML
25-50 INJECTION, SOLUTION INTRAMUSCULAR; INTRAVENOUS
Status: ACTIVE | OUTPATIENT
Start: 2021-04-09 | End: 2021-04-09

## 2021-04-09 RX ORDER — NITROGLYCERIN 20 MG/100ML
10-200 INJECTION INTRAVENOUS CONTINUOUS PRN
Status: DISCONTINUED | OUTPATIENT
Start: 2021-04-09 | End: 2021-04-09 | Stop reason: HOSPADM

## 2021-04-09 RX ORDER — IOPAMIDOL 755 MG/ML
INJECTION, SOLUTION INTRAVASCULAR
Status: DISCONTINUED | OUTPATIENT
Start: 2021-04-09 | End: 2021-04-09 | Stop reason: HOSPADM

## 2021-04-09 RX ORDER — ADENOSINE 3 MG/ML
INJECTION, SOLUTION INTRAVENOUS
Status: DISCONTINUED | OUTPATIENT
Start: 2021-04-09 | End: 2021-04-09 | Stop reason: HOSPADM

## 2021-04-09 RX ORDER — ARGATROBAN 1 MG/ML
350 INJECTION, SOLUTION INTRAVENOUS
Status: DISCONTINUED | OUTPATIENT
Start: 2021-04-09 | End: 2021-04-09 | Stop reason: HOSPADM

## 2021-04-09 RX ORDER — FENTANYL CITRATE 50 UG/ML
INJECTION, SOLUTION INTRAMUSCULAR; INTRAVENOUS
Status: DISCONTINUED | OUTPATIENT
Start: 2021-04-09 | End: 2021-04-09 | Stop reason: HOSPADM

## 2021-04-09 RX ORDER — BUMETANIDE 1 MG/1
2 TABLET ORAL DAILY
Status: DISCONTINUED | OUTPATIENT
Start: 2021-04-10 | End: 2021-04-10

## 2021-04-09 RX ORDER — NALOXONE HYDROCHLORIDE 0.4 MG/ML
0.4 INJECTION, SOLUTION INTRAMUSCULAR; INTRAVENOUS; SUBCUTANEOUS
Status: DISCONTINUED | OUTPATIENT
Start: 2021-04-09 | End: 2021-04-10 | Stop reason: HOSPADM

## 2021-04-09 RX ORDER — IODIXANOL 320 MG/ML
INJECTION, SOLUTION INTRAVASCULAR
Status: DISCONTINUED | OUTPATIENT
Start: 2021-04-09 | End: 2021-04-09 | Stop reason: HOSPADM

## 2021-04-09 RX ORDER — HEPARIN SODIUM 1000 [USP'U]/ML
INJECTION, SOLUTION INTRAVENOUS; SUBCUTANEOUS
Status: DISCONTINUED | OUTPATIENT
Start: 2021-04-09 | End: 2021-04-09 | Stop reason: HOSPADM

## 2021-04-09 RX ORDER — TIROFIBAN HYDROCHLORIDE 50 UG/ML
0.15 INJECTION INTRAVENOUS CONTINUOUS PRN
Status: DISCONTINUED | OUTPATIENT
Start: 2021-04-09 | End: 2021-04-09 | Stop reason: HOSPADM

## 2021-04-09 RX ORDER — VERAPAMIL HYDROCHLORIDE 2.5 MG/ML
INJECTION, SOLUTION INTRAVENOUS
Status: DISCONTINUED | OUTPATIENT
Start: 2021-04-09 | End: 2021-04-09 | Stop reason: HOSPADM

## 2021-04-09 RX ORDER — DOPAMINE HYDROCHLORIDE 160 MG/100ML
2-20 INJECTION, SOLUTION INTRAVENOUS CONTINUOUS PRN
Status: DISCONTINUED | OUTPATIENT
Start: 2021-04-09 | End: 2021-04-09 | Stop reason: HOSPADM

## 2021-04-09 RX ORDER — GABAPENTIN 300 MG/1
CAPSULE ORAL
COMMUNITY
End: 2021-06-24

## 2021-04-09 RX ORDER — NALOXONE HYDROCHLORIDE 0.4 MG/ML
0.2 INJECTION, SOLUTION INTRAMUSCULAR; INTRAVENOUS; SUBCUTANEOUS
Status: DISCONTINUED | OUTPATIENT
Start: 2021-04-09 | End: 2021-04-10 | Stop reason: HOSPADM

## 2021-04-09 RX ORDER — FLUMAZENIL 0.1 MG/ML
0.2 INJECTION, SOLUTION INTRAVENOUS
Status: ACTIVE | OUTPATIENT
Start: 2021-04-09 | End: 2021-04-10

## 2021-04-09 RX ORDER — HEPARIN SODIUM 10000 [USP'U]/100ML
100-1000 INJECTION, SOLUTION INTRAVENOUS CONTINUOUS PRN
Status: DISCONTINUED | OUTPATIENT
Start: 2021-04-09 | End: 2021-04-09 | Stop reason: HOSPADM

## 2021-04-09 RX ORDER — MULTIVIT-MIN/IRON/FOLIC ACID/K 18-600-40
CAPSULE ORAL
COMMUNITY
End: 2021-12-27

## 2021-04-09 RX ORDER — EPTIFIBATIDE 2 MG/ML
2 INJECTION, SOLUTION INTRAVENOUS CONTINUOUS PRN
Status: DISCONTINUED | OUTPATIENT
Start: 2021-04-09 | End: 2021-04-09 | Stop reason: HOSPADM

## 2021-04-09 RX ORDER — EPTIFIBATIDE 2 MG/ML
180 INJECTION, SOLUTION INTRAVENOUS EVERY 10 MIN PRN
Status: DISCONTINUED | OUTPATIENT
Start: 2021-04-09 | End: 2021-04-09 | Stop reason: HOSPADM

## 2021-04-09 RX ORDER — DOBUTAMINE HYDROCHLORIDE 200 MG/100ML
2-20 INJECTION INTRAVENOUS CONTINUOUS PRN
Status: DISCONTINUED | OUTPATIENT
Start: 2021-04-09 | End: 2021-04-09 | Stop reason: HOSPADM

## 2021-04-09 RX ORDER — NITROGLYCERIN 5 MG/ML
VIAL (ML) INTRAVENOUS
Status: DISCONTINUED | OUTPATIENT
Start: 2021-04-09 | End: 2021-04-09 | Stop reason: HOSPADM

## 2021-04-09 RX ORDER — ATROPINE SULFATE 0.1 MG/ML
0.5 INJECTION INTRAVENOUS
Status: ACTIVE | OUTPATIENT
Start: 2021-04-09 | End: 2021-04-10

## 2021-04-09 RX ADMIN — INSULIN ASPART 2 UNITS: 100 INJECTION, SOLUTION INTRAVENOUS; SUBCUTANEOUS at 09:05

## 2021-04-09 RX ADMIN — INSULIN ASPART 1 UNITS: 100 INJECTION, SOLUTION INTRAVENOUS; SUBCUTANEOUS at 18:16

## 2021-04-09 RX ADMIN — CLOPIDOGREL BISULFATE 75 MG: 75 TABLET ORAL at 09:04

## 2021-04-09 RX ADMIN — CARVEDILOL 25 MG: 12.5 TABLET, FILM COATED ORAL at 09:04

## 2021-04-09 RX ADMIN — GABAPENTIN 300 MG: 300 CAPSULE ORAL at 22:23

## 2021-04-09 RX ADMIN — INSULIN GLARGINE 20 UNITS: 100 INJECTION, SOLUTION SUBCUTANEOUS at 09:06

## 2021-04-09 RX ADMIN — PANTOPRAZOLE SODIUM 40 MG: 40 TABLET, DELAYED RELEASE ORAL at 09:04

## 2021-04-09 RX ADMIN — INSULIN ASPART 2 UNITS: 100 INJECTION, SOLUTION INTRAVENOUS; SUBCUTANEOUS at 12:33

## 2021-04-09 RX ADMIN — Medication 5 MG: at 00:00

## 2021-04-09 RX ADMIN — CARVEDILOL 25 MG: 12.5 TABLET, FILM COATED ORAL at 19:00

## 2021-04-09 RX ADMIN — LISINOPRIL 5 MG: 5 TABLET ORAL at 09:04

## 2021-04-09 RX ADMIN — LEVOTHYROXINE SODIUM 137 MCG: 0.03 TABLET ORAL at 19:53

## 2021-04-09 RX ADMIN — ISOSORBIDE MONONITRATE 30 MG: 30 TABLET, EXTENDED RELEASE ORAL at 09:04

## 2021-04-09 RX ADMIN — IRON SUCROSE 200 MG: 20 INJECTION, SOLUTION INTRAVENOUS at 18:11

## 2021-04-09 RX ADMIN — ATORVASTATIN CALCIUM 40 MG: 40 TABLET, FILM COATED ORAL at 20:33

## 2021-04-09 RX ADMIN — ASPIRIN 81 MG CHEWABLE TABLET 81 MG: 81 TABLET CHEWABLE at 09:04

## 2021-04-09 ASSESSMENT — ACTIVITIES OF DAILY LIVING (ADL)
ADLS_ACUITY_SCORE: 15

## 2021-04-09 ASSESSMENT — MIFFLIN-ST. JEOR: SCORE: 2032.5

## 2021-04-09 NOTE — PROGRESS NOTES
TRANSFORM-HF: TORSEMIDE COMPARISON WITH FUROSEMIDE FOR MANAGEMENT OF HEART FAILURE    PI: Nicola Moreno M.D., Ph.D - Phone: [phone] Pager: [phone]  Primary : Nupur Valadez RN - Phone: [phone] Pager: [phone]  : Elsie Gutierrez - Phone: [phone]    Patient was approached for possible participation for the above study. The current approved IRB consent form was discussed and explained to the patient.  It was discussed that involvement with the study is voluntary and refusal to participate would not involve penalty or decrease benefits at which the patient is entitled, and the subject may discontinue his/her involvement at any time without penalty or loss in benefits. The consent form was reviewed including purpose, research hypothesis, nature of the research, risk & benefits. Also reviewed were confidentiality issues, compensation for injury, and alternative procedures available. The patient was given time to review and ask any questions about the consent. Patient was shown contact information for PI and study staff in consent for future questions. Patient verbalized understanding of consent and study by restating the purpose, procedures, duration, risk, confidentiality of PHI, and voluntarily participation. Questions and concerns were addressed. Patient printed, signed and dated the consent and HIPAA form prior to study involvement. A copy was given to the patient for their records.     Subject Consent/HIPAA : SIGNED ON 09 APR 20221

## 2021-04-09 NOTE — PLAN OF CARE
Neuro: A&Ox4.   Cardiac: Afib-HR 60-70s VSS.   Respiratory: Sating high 90s on RA. Pt had a couple of episodes of desaturations into the 70s and oxygen was put on for a couple of hours.   GI/: Adequate urine output. No BM  Diet/appetite: NPO at midnight, 2g Na+, 2L FR prior to NPO BG ACHS  Activity:  SBA   Pain: Denies pain  Skin: No new deficits noted.  LDA's: RPIV-saline locked    Plan: Angiogram today. C ontinue with POC. Notify primary team with changes.

## 2021-04-09 NOTE — PROGRESS NOTES
TRANSFORM-HF: TORSEMIDE COMPARISON WITH FUROSEMIDE FOR MANAGEMENT OF HEART FAILURE    PI: Nicola Moreno M.D., Ph.D - Phone: [phone] Pager: [phone]  Primary : Nupur Valadez RN - Phone: [phone] Pager: [phone]  : Elsie Gutierrez - Phone: [phone]      Patient randomized to ORAL FUROSEMIDE

## 2021-04-09 NOTE — PHARMACY-ADMISSION MEDICATION HISTORY
Admission Medication History Completed by Pharmacy    See [facility] Admission Navigator for allergy information, preferred outpatient pharmacy, prior to admission medications and immunization status.     Medication History Sources:     Patient    SureScript's dispense report    Care Everywhere records    Changes made to PTA medication list (reason):    Added:   o Cholecalciferol 1000 units (25 mcg) tablets: take 1 tablet by mouth once daily (per patient and Care Everywhere)  o OTC supplement capsules for the eye: take 1 capsule by mouth once daily       Deleted:   o Nitroglycerin 0.4 mg sublingual tablets: place 1 tablet under the tongue every 5 minutes as needed for chest pain (per patient)      Changed:   o Gabapentin 300 mg capsules: take 300 mg by mouth at bedtime + gabapentin 300 mg capsules: take 300 mg by mouth nightly as needed in addition to scheduled dose --> gabapentin 300 mg capsules: take 1-3 capsules by mouth nightly at bedtime (per patient)  o Insulin glargine 100 unit/mL pen: inject 43-45 units subcutaneous every morning --> inject 44 units subcutaneously every morning (per patient)    Additional Information:    Patient was able to recall name, strength, dosage form, and directions of use for most of his medications.     Patient says he may sometimes take up to 3 gabapentin 300 mg capsules at night, but says he usually takes 2 capsules.     Patient reports taking an over the counter supplement for eye health, but he is unable to recall the product name.       Prior to Admission medications    Medication Sig Last Dose Taking? Auth Provider   apixaban ANTICOAGULANT (ELIQUIS) 5 MG tablet Take 1 tablet (5 mg) by mouth 2 times daily 4/7/2021 at AM  Yes Gill Doty PA   atorvastatin (LIPITOR) 40 MG tablet Take 1 tablet by mouth once every evening 4/6/2021 at PM Yes Gill Doty PA   carvedilol (COREG) 25 MG tablet Take 1 tablet (25 mg) by mouth 2 times daily (with meals) 4/7/2021 at AM Yes Justin Tomlin  MD Juan R   clopidogrel (PLAVIX) 75 MG tablet Take 1 tablet by mouth once daily 4/7/2021 at AM  Yes Justin Tomlin MD   furosemide (LASIX) 40 MG tablet Take 1 tablet by mouth once daily 4/7/2021 at AM Yes Nickie Lopez APRN CNP   gabapentin (NEURONTIN) 300 MG capsule Take 1-3 capsules by mouth once nightly at bedtime 4/6/2021 at PM Yes Unknown, Entered By History   insulin aspart (NOVOLOG FLEXPEN) 100 UNIT/ML pen Inject Subcutaneous 3 times daily (with meals) Sliding Scale  Do not give sliding scale insulin if Pre-Meal BG less than 140.   For Pre-Meal:   - 200 give 2 units.    - 250 give 4 units.    - 300 give 6 units.    - 350 give 8 units.    - 400 give 10 units. 4/7/2021 at Unknown time Yes Unknown, Entered By History   insulin aspart (NOVOLOG PEN) 100 UNIT/ML pen Inject 12 Units Subcutaneous 2 times daily (with meals) With breakfast and lunch 4/7/2021 at Unknown time Yes Mel Perez PA-C   insulin aspart (NOVOLOG PEN) 100 UNIT/ML pen Inject 14 units subcutaneously once daily with dinner 4/6/2021 at PM Yes Mel Perez PA-C   insulin glargine (LANTUS PEN) 100 UNIT/ML pen Inject 44 units subcutaneously once every morning 4/7/2021 at Unknown time Yes Unknown, Entered By History   isosorbide mononitrate (IMDUR) 30 MG 24 hr tablet Take 1 tablet by mouth once daily 4/7/2021 at AM Yes Nickie Lopez APRN CNP   levothyroxine (SYNTHROID, LEVOTHROID) 137 MCG tablet Take 1 tablet by mouth once every evening 4/7/2021 at AM Yes Henrry Figueroa PA-C   pantoprazole (PROTONIX) 40 MG EC tablet Take 1 tablet by mouth once every morning before breakfast 4/7/2021 at AM Yes Davion Cordova PA-C   UNKNOWN TO PATIENT Over the counter supplement capsules for the eyes: take 1 capsule by mouth once daily 4/7/2021 at AM Yes Unknown, Entered By History   Vitamin D, Cholecalciferol, 25 MCG (1000 UT) TABS Take 1 tablet by mouth once daily 4/7/2021  at AM Yes Unknown, Entered By History   Cadexomer Iodine, topical, 0.9% (IODOSORB) 0.9 % GEL gel Apply to ulcer on left foot every other day with gauze dressing. More than a month at Unknown time  Carlee Vega MD   Continuous Blood Gluc  (FREESTYLE CLARITA 2 READER SYSTM) DRAGAN 1 Device daily   Mel Perez PA-C   Continuous Blood Gluc Sensor (FREESTYLE CLARITA 2 SENSOR SYSTM) MISC 1 Units 4 times daily (before meals and nightly)   Mel Perez PA-C   insulin pen needle (31G X 6 MM) 31G X 6 MM miscellaneous Use  as directed.   Sarah Bolivar MD   order for DME Equipment being ordered: size 12 surgical shoe   Ryan Bhagat DPM   order for DME Equipment being ordered: Walker Wheels () and Walker ()  Treatment Diagnosis: Impaired gait, heel WB bilaterally.  Patient not taking: Reported on 2/9/2021   Herb Zurita III, MD         Date completed: 04/09/21    Medication history completed by: Alex Johnson Fourth-Year Student Pharmacist

## 2021-04-09 NOTE — PROGRESS NOTES
[facility]   Cardiology Consults  Progress Note     Interval History:  - about 6L net negative since yesterday with IV lasix  - has had on and off chest pressure while on unit. Did not report this to RN last PM      Changes today:  - decrease lasix to 20mg IV bid.   - continue to hold apixaban  - NPO for angiogram    Physical Exam:  Temp:  [97.6  F (36.4  C)-98.1  F (36.7  C)] 97.7  F (36.5  C)  Pulse:  [] 71  Resp:  [16-20] 20  BP: ()/(69-90) 134/87  SpO2:  [95 %-100 %] 98 %      Intake/Output Summary (Last 24 hours) at 4/9/2021 1050  Last data filed at 4/9/2021 0900  Gross per 24 hour   Intake 860 ml   Output 3825 ml   Net -2965 ml       Wt:   Wt Readings from Last 5 Encounters:   04/09/21 117.1 kg (258 lb 2.5 oz)   02/17/21 117.5 kg (259 lb)   02/09/21 117.5 kg (259 lb 1.6 oz)   01/03/21 112.4 kg (247 lb 11.2 oz)   12/22/20 117 kg (258 lb)       GEN: NAD, awake, alert, pleasant  Pulm: CTAB, no wheeze or crackles  Cardiac: irregular, normal rate, JVP difficult to evaluate due to body habitus, no murmurs  Vascular: 1+ bilateral lower extremity edema and palpable pulses  GI: soft, non distended  Neuro: CN II-XII grossly intact    Medications:    [Held by provider] apixaban ANTICOAGULANT  5 mg Oral BID     aspirin  81 mg Oral Daily     atorvastatin  40 mg Oral QPM     carvedilol  25 mg Oral BID w/meals     clopidogrel  75 mg Oral Daily     [Held by provider] furosemide  20 mg Intravenous BID     gabapentin  300 mg Oral At Bedtime     insulin aspart  1-7 Units Subcutaneous TID AC     insulin aspart  1-5 Units Subcutaneous At Bedtime     insulin glargine  20 Units Subcutaneous QAM     iron sucrose (VENOFER) intermittent infusion (200 mg)  200 mg Intravenous Q24H     isosorbide mononitrate  30 mg Oral Daily     levothyroxine  137 mcg Oral QPM     lisinopril  5 mg Oral Daily     pantoprazole  40 mg Oral Daily     sodium chloride (PF)  10 mL Intravenous Once       - MEDICATION  INSTRUCTIONS -       - MEDICATION INSTRUCTIONS -       - MEDICATION INSTRUCTIONS -         Labs:   CMP  Recent Labs   Lab 04/09/21  0441 04/08/21  1215 04/08/21  0426 04/08/21  0243 04/07/21 2014    137 137 138 137   POTASSIUM 3.8 3.8 3.3* 3.4 4.0   CHLORIDE 100 102 101 101 101   CO2 29 28 29 31 29   ANIONGAP 6 6 6 6 7   * 144* 122* 137* 179*   BUN 28 22 24 24 22   CR 1.33* 1.27* 1.27* 1.36* 1.45*   GFRESTIMATED 53* 57* 57* 52* 48*   GFRESTBLACK 62 66 66 60* 56*   BETTIE 9.4 8.9 9.0 8.9 8.9   MAG 2.1  --  2.0 1.9  --    PROTTOTAL  --   --   --  6.8 6.7*   ALBUMIN  --   --   --  3.7 3.6   BILITOTAL  --   --   --  1.4* 1.3   ALKPHOS  --   --   --  109 108   AST  --   --   --  14 15   ALT  --   --   --  30 30     CBC  Recent Labs   Lab 04/08/21  0243 04/07/21 2014   WBC 4.5 5.3   RBC 3.59* 3.55*   HGB 10.2* 10.3*   HCT 31.5* 31.7*   MCV 88 89   MCH 28.4 29.0   MCHC 32.4 32.5   RDW 13.5 13.5    203     INR  Recent Labs   Lab 04/09/21  0441 04/08/21  0426   INR 1.36* 1.39*     Arterial Blood GasNo lab results found in last 7 days.    Diagnostics:  Afib, bifascicular block (appears old)      ASSESSMENT/PLAN:  #Atypical Chest Pain  #CAD w/ hx of multiple stent placements   #Uncontrolled Hypertension   #ICM  Presents with left chest pressure with radiation into neckof few days duration. Intermittent and only lasting a few seconds. Patient initially requested nitroglycerin for chest pain, but pain was resolved prior to administration. Last angiogram in 2019 100% stenosis of RCA s/p PCI, and patient OM and LAD stents. Pain worsened with exertional, and similar to prior episodes of angina. Given extensive history, will pursue angiogram prior to discharge. Will need diuresis first. Troponin negative x3, EKG as above.   - On telemetry   - lisinopril 5mg daily  - continue carvedilol 25mg bid  - continue atorvastatin 40mg  - continue imdur 30mg   - continue plavix  - aspirin 81mg while holding apixaban (is on  "chronic plavix at baseline)  - NPO MN      #Acute on Chronic HFrEF Exacerbation (EF 30-35%)   #pAfib on Apixaban   Approximately 20 pound weight gain with significant dyspnea and nightly PND. 3+ pitting edema bilaterally. BNP elevated at 1292. Patient was recently hospitalized in January for orthostatic hypotension and instructed to decrease his PTA Lasix from 40 mg to 20 mg, however he states that he did not do this and has been taking Lasix 40 mg daily. Responding well to IV lasix.  - hold apixaban as above  - BB: coreg as above   - ACEi/ARB/ARNI: lisinopril as above  - Aldosterone antagonist: None   - Diuretic: lasix 40mg IV bid, bid BMP while diuresing  - Daily Weights, Strict I/O's  - Low Na Diet  - 2L Fluid Restriction     #Hx of Multiple Strokes   - PTA Plavix, statin     #ABNER on CKD 3   #Cardiorenal Syndrome   Cr elevated at 1.45 on admission. Baseline around 1.2. Likely cardiorenal related to volume overloaded state. Improving with diuresis.   - Avoid nephrotoxic agents   - BMP BID   - Electrolyte replacement PRN      #DM2 c/b neuropathy and LLE diabetic ulcer   - PTA Insulin Glargine 43-45 units QAM decreased to 20 units in setting of patient being NPO   - med dose sliding scale insulin   - hypoglycemia protocol   - WOC consult for chronic LLE diabetic ulcer      #HLD   - PTA Atorvastatin      #Hypothyroidism   TSH WNL   - PTA Levothyroxine      #GERD   - PTA Pantoprazole     #normocytic anemia  #Iron deficiency anemia  Hgb 10 on admission. Unclear cause, no signs of bleeding. Last colonoscopy was \"a long time ago\". Iron studies indicative of FLY. Last colonoscopy 2004. Discussed improtance of regular colon cancer screenings. Will   - FOBT ordered  - IV iron ordered     Diet: NPO for angiogram today   DVT Prophylaxis: PCD   Taylor Catheter: not present  Code Status: FULL   Fluids: None   Lines: PIV     Patient seen and discussed with Dr. Bell, who agrees with above plan.      Davion Perez PA-C  [facility] " Cardiology Team

## 2021-04-10 VITALS
BODY MASS INDEX: 31.46 KG/M2 | SYSTOLIC BLOOD PRESSURE: 115 MMHG | HEIGHT: 76 IN | TEMPERATURE: 98.4 F | HEART RATE: 78 BPM | WEIGHT: 258.38 LBS | OXYGEN SATURATION: 95 % | DIASTOLIC BLOOD PRESSURE: 76 MMHG | RESPIRATION RATE: 20 BRPM

## 2021-04-10 LAB
ANION GAP SERPL CALCULATED.3IONS-SCNC: 6 MMOL/L (ref 3–14)
BUN SERPL-MCNC: 25 MG/DL (ref 7–30)
CALCIUM SERPL-MCNC: 9.1 MG/DL (ref 8.5–10.1)
CHLORIDE SERPL-SCNC: 101 MMOL/L (ref 94–109)
CO2 SERPL-SCNC: 30 MMOL/L (ref 20–32)
CREAT SERPL-MCNC: 1.26 MG/DL (ref 0.66–1.25)
GFR SERPL CREATININE-BSD FRML MDRD: 57 ML/MIN/{1.73_M2}
GLUCOSE BLDC GLUCOMTR-MCNC: 181 MG/DL (ref 70–99)
GLUCOSE BLDC GLUCOMTR-MCNC: 212 MG/DL (ref 70–99)
GLUCOSE SERPL-MCNC: 177 MG/DL (ref 70–99)
INR PPP: 1.23 (ref 0.86–1.14)
INTERPRETATION ECG - MUSE: NORMAL
MAGNESIUM SERPL-MCNC: 2.3 MG/DL (ref 1.6–2.3)
POTASSIUM SERPL-SCNC: 3.9 MMOL/L (ref 3.4–5.3)
SODIUM SERPL-SCNC: 137 MMOL/L (ref 133–144)

## 2021-04-10 PROCEDURE — 99239 HOSP IP/OBS DSCHRG MGMT >30: CPT | Performed by: INTERNAL MEDICINE

## 2021-04-10 PROCEDURE — 83735 ASSAY OF MAGNESIUM: CPT | Performed by: STUDENT IN AN ORGANIZED HEALTH CARE EDUCATION/TRAINING PROGRAM

## 2021-04-10 PROCEDURE — 36415 COLL VENOUS BLD VENIPUNCTURE: CPT | Performed by: STUDENT IN AN ORGANIZED HEALTH CARE EDUCATION/TRAINING PROGRAM

## 2021-04-10 PROCEDURE — 250N000013 HC RX MED GY IP 250 OP 250 PS 637: Performed by: STUDENT IN AN ORGANIZED HEALTH CARE EDUCATION/TRAINING PROGRAM

## 2021-04-10 PROCEDURE — 999N001017 HC STATISTIC GLUCOSE BY METER IP

## 2021-04-10 PROCEDURE — 250N000013 HC RX MED GY IP 250 OP 250 PS 637: Performed by: PHYSICIAN ASSISTANT

## 2021-04-10 PROCEDURE — 80048 BASIC METABOLIC PNL TOTAL CA: CPT | Performed by: STUDENT IN AN ORGANIZED HEALTH CARE EDUCATION/TRAINING PROGRAM

## 2021-04-10 PROCEDURE — 85610 PROTHROMBIN TIME: CPT | Performed by: STUDENT IN AN ORGANIZED HEALTH CARE EDUCATION/TRAINING PROGRAM

## 2021-04-10 RX ORDER — FUROSEMIDE 20 MG
80 TABLET ORAL
Status: DISCONTINUED | OUTPATIENT
Start: 2021-04-10 | End: 2021-04-10 | Stop reason: HOSPADM

## 2021-04-10 RX ORDER — FERROUS SULFATE 325(65) MG
325 TABLET ORAL
Qty: 30 TABLET | Refills: 3 | Status: SHIPPED | OUTPATIENT
Start: 2021-04-10 | End: 2021-12-27

## 2021-04-10 RX ORDER — POTASSIUM CHLORIDE 1500 MG/1
20 TABLET, EXTENDED RELEASE ORAL DAILY
Qty: 30 TABLET | Refills: 11 | Status: SHIPPED | OUTPATIENT
Start: 2021-04-11 | End: 2021-05-17

## 2021-04-10 RX ORDER — NITROGLYCERIN 0.4 MG/1
TABLET SUBLINGUAL
Qty: 15 TABLET | Refills: 3 | Status: SHIPPED | OUTPATIENT
Start: 2021-04-10

## 2021-04-10 RX ORDER — FUROSEMIDE 40 MG
TABLET ORAL
Qty: 90 TABLET | Refills: 4 | Status: SHIPPED | OUTPATIENT
Start: 2021-04-10 | End: 2021-05-17

## 2021-04-10 RX ORDER — LISINOPRIL 5 MG/1
5 TABLET ORAL DAILY
Qty: 30 TABLET | Refills: 11 | Status: SHIPPED | OUTPATIENT
Start: 2021-04-11 | End: 2021-05-17

## 2021-04-10 RX ORDER — POTASSIUM CHLORIDE 750 MG/1
20 TABLET, EXTENDED RELEASE ORAL DAILY
Status: DISCONTINUED | OUTPATIENT
Start: 2021-04-10 | End: 2021-04-10 | Stop reason: HOSPADM

## 2021-04-10 RX ADMIN — ISOSORBIDE MONONITRATE 30 MG: 30 TABLET, EXTENDED RELEASE ORAL at 07:50

## 2021-04-10 RX ADMIN — LISINOPRIL 5 MG: 5 TABLET ORAL at 07:50

## 2021-04-10 RX ADMIN — PANTOPRAZOLE SODIUM 40 MG: 40 TABLET, DELAYED RELEASE ORAL at 07:50

## 2021-04-10 RX ADMIN — CLOPIDOGREL BISULFATE 75 MG: 75 TABLET ORAL at 07:50

## 2021-04-10 RX ADMIN — POTASSIUM CHLORIDE 20 MEQ: 750 TABLET, EXTENDED RELEASE ORAL at 07:55

## 2021-04-10 RX ADMIN — FUROSEMIDE 80 MG: 20 TABLET ORAL at 07:54

## 2021-04-10 RX ADMIN — APIXABAN 5 MG: 5 TABLET, FILM COATED ORAL at 07:50

## 2021-04-10 RX ADMIN — INSULIN ASPART 1 UNITS: 100 INJECTION, SOLUTION INTRAVENOUS; SUBCUTANEOUS at 07:50

## 2021-04-10 RX ADMIN — CARVEDILOL 25 MG: 12.5 TABLET, FILM COATED ORAL at 07:50

## 2021-04-10 ASSESSMENT — MIFFLIN-ST. JEOR: SCORE: 2033.5

## 2021-04-10 ASSESSMENT — ACTIVITIES OF DAILY LIVING (ADL)
ADLS_ACUITY_SCORE: 15

## 2021-04-10 NOTE — DISCHARGE SUMMARY
[address] [address]  p: [phone]    Discharge Summary: Cardiology Service    Jayro Elder MRN# [id number]   YOB: 1950 Age: 70 year old       Admission Date: 4/7/2021  Discharge Date: 04/10/2021      Discharge Diagnoses:  1. Atypical chest pain  2. Coronary artery disease  3. Hypertension  4. Ischemic cardiomyopathy   5. Acute on chronic HFrEF exacerbation  6. Paroxysmal atrial fibrillation   7. History of multiple embolic strokes  8. ABNER on CKD 3A - improving  9. Hyperlipidemia   10. Hypothyroidism   11. GERD  12. Iron deficiency anemia       Pertinent Procedures:  1. Coronary angiogram    Consults:  CORE clinic    Imaging with results:  Echocardiogram 4/8/2021:  Interpretation Summary  Mildly (EF 40-45%) reduced left ventricular function is present.  Global right ventricular function is mildly reduced.  IVC diameter >2.1 cm collapsing <50% with sniff suggests a high RA pressure  estimated at 15 mmHg or greater.  No pericardial effusion is present.  Compared to prior study, there may be mild improvement in LV fxn.  ______________________________________________________________________________  Left Ventricle  Left ventricular wall thickness is normal. Left ventricular size is normal.  Diastolic function not assessed due to atrial fibrillation. Mildly (EF 40-45%)  reduced left ventricular function is present. Mild diffuse hypokinesis is  present. Mid septal akinesis.     Right Ventricle  The right ventricle is normal size. Global right ventricular function is  mildly reduced.     Atria  The atria cannot be assessed.     Mitral Valve  The mitral valve is normal.     Aortic Valve  The aortic valve is tricuspid. Mild aortic insufficiency is present.     Tricuspid Valve  Mild tricuspid insufficiency is present. The right ventricular systolic  pressure is approximated at 30.9 mmHg plus the right atrial pressure.     Pulmonic  Valve  The pulmonic valve is normal.     Vessels  The thoracic aorta is normal. IVC diameter >2.1 cm collapsing <50% with sniff  suggests a high RA pressure estimated at 15 mmHg or greater.     Pericardium  No pericardial effusion is present.    Coronary angiogram  Diagnostic  Dominance: Right  Left Main   The vessel is moderate in size. There was 0% vessel disease.   Left Anterior Descending   Prox LAD lesion is 20% stenosed. The lesion was previously treated using a drug-eluting stent. Previous treatment took place >2 years ago. No thrombosis in the previous stent. The lesion has in-stent.   Mid LAD lesion is 75% stenosed. The lesion was previously treated using a stent of unknown type. Pressure wire/FFR was performed on the lesion. FFR at peak hyperemia: 0.87.   Mid LAD to Dist LAD lesion is 50% stenosed. The lesion was previously treated using a stent of unknown type.   First Diagonal Branch   0% stenosed 1st Diag lesion.   Left Circumflex   Prox Cx lesion is 10% stenosed.   Dist Cx lesion is 5% stenosed.   Third Obtuse Marginal Branch   3rd Mrg lesion is 25% stenosed. The lesion was previously treated using a drug-eluting stent. Previous treatment took place 1-2 years ago. No thrombosis in the previous stent. The lesion has in-stent.   Right Coronary Artery   Prox RCA lesion is 30% stenosed.   Mid RCA lesion is 40% stenosed. Culprit lesion. The lesion is type B2 - medium risk and thrombotic. The lesion was previously treated using a drug-eluting stent, angioplasty and thrombectomy.   Dist RCA lesion is 25% stenosed. Culprit lesion. The lesion is type B1 - medium risk and thrombotic. The lesion was previously treated using a drug-eluting stent and angioplasty.     Brief HPI:  Jayro Elder is a 70 year old male with PMH CAD with multiple stents (10 stents placed, last stent placed within 3 years per patient), HFrEF (30-35%), ICM, HTN, pAfib on Apixaban, multiple strokes (x4 per patient), DM2 c/b neuropathy,  MARISOL, hypothyroidism, chronic diabetic foot ulcer of LLE, GERD who presents with complaints of chest pain and dyspnea.     Hospital Course by Diagnosis:  #Atypical Chest Pain  #CAD w/ hx of multiple stent placements   #Uncontrolled Hypertension   #ICM  Presents with left chest pressure with radiation into neckof few days duration. Intermittent and only lasting a few seconds. Patient initially requested nitroglycerin for chest pain, but pain was resolved prior to administration. Last angiogram in 2019 100% stenosis of RCA s/p PCI, and patient OM and LAD stents. Pain reportedly worsened with exertional, and similar to prior episodes of angina. Superimposed is reproducible chest pain on exam. Given extensive history, patient referred for angiogram to further define anatomy with non-significant 75% lesion in prior LAD stent as above. Patient diuresed 8.7L to a weight of 258. He continued to have trace lower extremity edema, however, was not requiring oxygen, and reported resolution with exertional dyspnea and orthopnea. He will establish care with cardiology as an outpatient in 2-3 weeks. We also requested a CORE consult, however, he was unable to be seen inpatient and thus will be scheduled as an outpatient.   - start lisinopril 5mg daily  - continue carvedilol 25mg bid  - continue atorvastatin 40mg  - continue imdur 30mg   - continue plavix  - lasix increased to 80mg in the morning, 40mg in the evening. He will start 20meq of potassium daily. Patient enrolled in TRANSFORM study     #Acute on Chronic HFrEF Exacerbation (EF 30-35%)   #pAfib on Apixaban   Approximately 20 pound weight gain with significant dyspnea and nightly PND. 3+ pitting edema bilaterally. BNP elevated at 1292. Patient was recently hospitalized in January for orthostatic hypotension and instructed to decrease his PTA Lasix from 40 mg to 20 mg, however he states that he did not do this and has been taking Lasix 40 mg daily. Diuresed as above.   -  "BB: coreg as above   - ACEi/ARB/ARNI: lisinopril as above  - Aldosterone antagonist: None   - Diuretic: lasix as above  - Low Na Diet  - 2L Fluid Restriction  - CORE clinic as outpatient     #Hx of Multiple Strokes   - PTA Plavix, statin     #ABNER on CKD 3 - improved  #Cardiorenal Syndrome   Cr elevated at 1.45 on admission. Baseline around 1.1. Improved with diuresis.   - BMP with PCP visit in about 1 week     #DM2 c/b neuropathy and LLE diabetic ulcer   - resume PTA insulin    #HLD   - PTA Atorvastatin      #Hypothyroidism   TSH WNL   - PTA Levothyroxine      #GERD   - PTA Pantoprazole      #normocytic anemia  #Iron deficiency anemia  Hgb 10 on admission. Unclear cause, no signs of bleeding. Last colonoscopy was \"a long time ago\". Iron studies indicative of FLY. Last colonoscopy 2004. Discussed improtance of regular colon cancer screenings. Patient states he does not currently have a primary due to multiple reasons. Instructed him to call and establish new primary care visit as he has multiple health issues that will require regular monitoring and care.  - 200mg IV sucrose x2 done while inpatient  - start iron sulfate daily as outpatient  - follow up with PCP      Condition on discharge  Temp:  [97.5  F (36.4  C)-98  F (36.7  C)] 97.7  F (36.5  C)  Pulse:  [58-73] 62  Resp:  [16-20] 20  BP: (101-134)/(63-96) 119/76  SpO2:  [90 %-98 %] 96 %  General: Alert, interactive, NAD  Eyes: sclera anicteric, EOMI  Neck: JVP not appreciably elevated, carotid 2+ bilaterally  Cardiovascular: regular rate and rhythm, normal S1 and S2, no murmurs, gallops, or rubs  Resp: clear to auscultation bilaterally, no rales, wheezes, or rhonchi  GI: Soft, nontender, nondistended. +BS.  No HSM or masses, no rebound or guarding.  Extremities: trace edema, no cyanosis or clubbing, dorsalis pedis and posterior tibialis pulses 2+ bilaterally. Right radial pulse intact, access site is c/d/i, no hematoma  Skin: Warm and dry, no jaundice or " rash  Neuro: CN 2-12 intact, moves all extremities equally  Psych: Alert & oriented x 3      Medication Changes:    Discharge medications:   Current Discharge Medication List      START taking these medications    Details   ferrous sulfate (FEROSUL) 325 (65 Fe) MG tablet Take 1 tablet (325 mg) by mouth daily (with breakfast)  Qty: 30 tablet, Refills: 3    Associated Diagnoses: Iron deficiency anemia, unspecified iron deficiency anemia type      lisinopril (ZESTRIL) 5 MG tablet Take 1 tablet (5 mg) by mouth daily  Qty: 30 tablet, Refills: 11    Associated Diagnoses: Ischemic cardiomyopathy      nitroGLYcerin (NITROSTAT) 0.4 MG sublingual tablet For chest pain place 1 tablet under the tongue every 5 minutes for 3 doses. If symptoms persist 5 minutes after 1st dose call 911.  Qty: 15 tablet, Refills: 3    Associated Diagnoses: Coronary artery disease involving native coronary artery of native heart with angina pectoris (H)      potassium chloride ER (KLOR-CON M) 20 MEQ CR tablet Take 1 tablet (20 mEq) by mouth daily  Qty: 30 tablet, Refills: 11    Associated Diagnoses: Ischemic cardiomyopathy         CONTINUE these medications which have CHANGED    Details   furosemide (LASIX) 40 MG tablet Take 80mg (2 tabs) every morning, and 40mg (1 tab) every afternoon.  Qty: 90 tablet, Refills: 4    Associated Diagnoses: Ischemic cardiomyopathy         CONTINUE these medications which have NOT CHANGED    Details   apixaban ANTICOAGULANT (ELIQUIS) 5 MG tablet Take 1 tablet (5 mg) by mouth 2 times daily    Associated Diagnoses: Chronic atrial fibrillation (H)      atorvastatin (LIPITOR) 40 MG tablet Take 1 tablet (40 mg) by mouth every evening  Qty: 90 tablet, Refills: 3    Associated Diagnoses: Cerebrovascular accident (CVA) due to embolism of left posterior cerebral artery (H)      carvedilol (COREG) 25 MG tablet Take 1 tablet (25 mg) by mouth 2 times daily (with meals)  Qty: 180 tablet, Refills: 3    Associated Diagnoses:  Essential hypertension      clopidogrel (PLAVIX) 75 MG tablet Take 1 tablet (75 mg) by mouth daily  Qty: 90 tablet, Refills: 3    Associated Diagnoses: Unstable angina (H)      gabapentin (NEURONTIN) 300 MG capsule Take 1-3 capsules by mouth once nightly at bedtime      !! insulin aspart (NOVOLOG FLEXPEN) 100 UNIT/ML pen Inject Subcutaneous 3 times daily (with meals) Sliding Scale  Do not give sliding scale insulin if Pre-Meal BG less than 140.   For Pre-Meal:   - 200 give 2 units.    - 250 give 4 units.    - 300 give 6 units.    - 350 give 8 units.    - 400 give 10 units.      !! insulin aspart (NOVOLOG PEN) 100 UNIT/ML pen Inject 12 Units Subcutaneous 2 times daily (with meals) With breakfast and lunch    Associated Diagnoses: Type 2 diabetes mellitus with diabetic peripheral angiopathy without gangrene, with long-term current use of insulin (H)      !! insulin aspart (NOVOLOG PEN) 100 UNIT/ML pen Inject 14 Units Subcutaneous daily (with dinner)    Associated Diagnoses: Type 2 diabetes mellitus with diabetic peripheral angiopathy without gangrene, with long-term current use of insulin (H)      insulin glargine (LANTUS PEN) 100 UNIT/ML pen Inject 44 units subcutaneously once every morning    Comments: If Lantus is not covered by insurance, may substitute Basaglar at same dose and frequency.        isosorbide mononitrate (IMDUR) 30 MG 24 hr tablet Take 1 tablet (30 mg) by mouth daily  Qty: 90 tablet, Refills: 1    Associated Diagnoses: Ischemic cardiomyopathy      levothyroxine (SYNTHROID, LEVOTHROID) 137 MCG tablet Take 1 tablet by mouth once every evening  Qty: 90 tablet, Refills: 3      pantoprazole (PROTONIX) 40 MG EC tablet TAKE 1 TABLET BY MOUTH EVERY MORNING BEFORE BREAKFAST  Qty: 90 tablet, Refills: 1    Associated Diagnoses: Gastroesophageal reflux disease      Vitamin D, Cholecalciferol, 25 MCG (1000 UT) TABS Take 1 tablet by mouth once daily      Cadexomer Iodine, topical,  0.9% (IODOSORB) 0.9 % GEL gel Apply to ulcer on left foot every other day with gauze dressing.  Qty: 40 g, Refills: 0    Associated Diagnoses: Diabetic infection of left foot (H)      Continuous Blood Gluc  (FREESTYLE CLARIAT 2 READER SYSTM) DRAGAN 1 Device daily  Qty: 1 each, Refills: 3    Associated Diagnoses: Type 2 diabetes mellitus with diabetic peripheral angiopathy without gangrene, with long-term current use of insulin (H)      Continuous Blood Gluc Sensor (FREESTYLE CLAIRTA 2 SENSOR SYSTM) MISC 1 Units 4 times daily (before meals and nightly)  Qty: 1 each, Refills: 3    Associated Diagnoses: Type 2 diabetes mellitus with diabetic peripheral angiopathy without gangrene, with long-term current use of insulin (H)      insulin pen needle (31G X 6 MM) 31G X 6 MM miscellaneous Use  as directed.  Qty: 100 each, Refills: 11    Associated Diagnoses: Type 2 diabetes mellitus with hyperosmolarity without coma, with long-term current use of insulin (H)      !! order for DME Equipment being ordered: size 12 surgical shoe  Qty: 1 Device, Refills: 0    Associated Diagnoses: Diabetic polyneuropathy associated with type 2 diabetes mellitus (H); Ulcer of left foot, with fat layer exposed (H)      !! order for DME Equipment being ordered: Walker Wheels () and Walker ()  Treatment Diagnosis: Impaired gait, heel WB bilaterally.  Qty: 1 each, Refills: 0    Associated Diagnoses: Diabetic ulcer of left midfoot associated with diabetes mellitus of other type, unspecified ulcer stage (H)       !! - Potential duplicate medications found. Please discuss with provider.      STOP taking these medications       UNKNOWN TO PATIENT Comments:   Reason for Stopping:               Labs or imaging requiring follow-up after discharge:  BMP in about 1 week with PCP      Follow-up:  PCP in 7-10 days  Cardiology clinic in 2-3 weeks    Code status:  FULL      60 minutes spent in discharge, including >50% in counseling and coordination  of care, medication review and plan of care recommended on follow up. Questions were answered.   It was our pleasure to care for Jayro Elder during this hospitalization. Please do not hesitate to contact me should there be questions regarding the hospital course or discharge plan.        Davion Perez PA-C  [facility] Cardiology Team

## 2021-04-10 NOTE — PLAN OF CARE
"BP (!) 123/93   Pulse 62   Temp 97.8  F (36.6  C) (Oral)   Resp 16   Ht 1.93 m (6' 4\")   Wt 117.1 kg (258 lb 2.5 oz)   SpO2 97%   BMI 31.42 kg/m      Afib rates 50s-70s. BPs stable. RA. Denies pain. A&O x4. Intermittent dizziness. NPO until post cath lab procedure. Tolerating diet post procedure. BS slightly elevated. Voiding adequately. L foot wound care done. Up with SBA. R radial access site with TR band in place post cath lab. Due to start deflating @ 1930. CMS intact. No hematoma at site. Will continue to monitor.   "

## 2021-04-10 NOTE — PLAN OF CARE
Neuro: A&Ox4. Numbness in hands and feet at baseline.  Cardiac: Afib HR 50-60s VSS.    Respiratory: Sating high 90s on RA.  GI/: Adequate urine output. No BM  Diet/appetite: Tolerating 2g Na, 2L FR diet. Eating well.  Activity:  SBA  Pain: At acceptable level on current regimen.   Skin: No new deficits noted.  LDA's: RPIV-saline locked    Plan: Possible discharge today. Continue with POC. Notify primary team with changes.

## 2021-04-11 ENCOUNTER — PATIENT OUTREACH (OUTPATIENT)
Dept: CARE COORDINATION | Facility: CLINIC | Age: 71
End: 2021-04-11

## 2021-04-11 NOTE — PLAN OF CARE
"/76 (BP Location: Left arm)   Pulse 78   Temp 98.4  F (36.9  C) (Oral)   Resp 20   Ht 1.93 m (6' 4\")   Wt 117.2 kg (258 lb 6.1 oz)   SpO2 95%   BMI 31.45 kg/m      VSS. RA. Denies pain. R radial access site slightly ecchymotic-no hematoma. Tolerating diet. Voiding adequately. 1 BM. Up independently in room. Discharged to home with wife transport-all discharge instructions and meds reviewed.   "

## 2021-04-12 ENCOUNTER — TELEPHONE (OUTPATIENT)
Dept: CARDIOLOGY | Facility: CLINIC | Age: 71
End: 2021-04-12
Payer: COMMERCIAL

## 2021-04-12 DIAGNOSIS — R06.02 SOB (SHORTNESS OF BREATH): ICD-10-CM

## 2021-04-12 DIAGNOSIS — I50.22 CHRONIC SYSTOLIC CONGESTIVE HEART FAILURE (H): Primary | ICD-10-CM

## 2021-04-12 NOTE — PROGRESS NOTES
[facility]: Post-Discharge Note  SITUATION                                                      Admission:    Admission Date: 04/07/21   Reason for Admission: Acute on chronic systolic congestive heart failure  Discharge:   Discharge Date: 04/10/21  Discharge Diagnosis: Acute on chronic systolic congestive heart failure    BACKGROUND                                                      Jayro Elder is a 70 year old male with PMH CAD with multiple stents (10 stents placed, last stent placed within 3 years per patient), HFrEF (30-35%), ICM, pAfib on Apixaban, multiple strokes (x4 per patient), DM2 c/b neuropathy and LLE ulcer, MARISOL, hypothyroidism, GERD who presents with complaints of chest pain and dyspnea. Patient states that his dyspnea began about one week ago, and seems to have worsened after his second COVID vaccine. He is increasingly fatigued and notices worsening dyspnea particularly with movement. He has also noticed 20 pound weight gain, dry weight is around 247-250, currently weighs 267 lbs. He also has worsening lower extremity edema bilaterally as well as increased abdominal girth from bloating. He denies orthopnea, but mentions that he often sleeps on his side. Endorses PND every night. States he takes his medications as prescribed. He was recently hospitalized on 1/3/21 to 1/4/21 with complaints of light headedness/orthostatic hypotension and was told to decreased his Lasix from 40mg to 20mg, however he states that he did not make this medication adjustment and has been taking Lasix 40 mg every day. He tries to avoid salt in his diet, weighs himself daily, and drinks less than 2 L each day.     ASSESSMENT      Discharge Assessment  Patient reports symptoms are: Improved  Does the patient have all of their medications?: Yes  Does patient know what their new medications are for?: Yes  Does patient have a follow-up appointment scheduled?: Yes  Does patient have any other questions or concerns?:  No    Post-op  Did the patient have surgery or a procedure: Yes  Fever: No  Chills: No  Eating & Drinking: eating and drinking without complaints/concerns  Bowel Function: normal  Urinary Status: voiding without complaint/concerns      PLAN                                                      Outpatient Plan:  PCP in 7-10 days  Cardiology clinic in 2-3 weeks    Future Appointments   Date Time Provider Department Center   4/15/2021  7:45 AM Татьяна Mckeon DPM, Podiatry/Foot and Ankle Surgery [facility]   4/15/2021 11:45 AM [facility]   4/15/2021 12:30 PM Gael Christensen NP [facility]   5/17/2021  1:30 PM [facility]   5/17/2021  2:15 PM [facility]           Jessie Acosta

## 2021-04-12 NOTE — PLAN OF CARE
Occupational Therapy Discharge Summary    Reason for therapy discharge:    Discharged to home.    Progress towards therapy goal(s). See goals on Care Plan in [facility] electronic health record for goal details.  Goals partially met.  Barriers to achieving goals:   discharge from facility.    Therapy recommendation(s):    No further therapy is recommended.

## 2021-04-12 NOTE — TELEPHONE ENCOUNTER
Patient left VM on Complex Care line at 11:33am    Needs Hospital follow up with Dr. King.  I believe this is for Cardiology.  Please callback patient [phone]    Thank you  Flavio MICHAEL   - Complex Care Team

## 2021-04-15 ENCOUNTER — OFFICE VISIT (OUTPATIENT)
Dept: CARDIOLOGY | Facility: CLINIC | Age: 71
End: 2021-04-15
Payer: COMMERCIAL

## 2021-04-15 ENCOUNTER — OFFICE VISIT (OUTPATIENT)
Dept: PODIATRY | Facility: CLINIC | Age: 71
End: 2021-04-15
Payer: COMMERCIAL

## 2021-04-15 VITALS
WEIGHT: 258 LBS | DIASTOLIC BLOOD PRESSURE: 70 MMHG | SYSTOLIC BLOOD PRESSURE: 128 MMHG | HEIGHT: 76 IN | BODY MASS INDEX: 31.42 KG/M2

## 2021-04-15 VITALS
HEIGHT: 76 IN | HEART RATE: 68 BPM | SYSTOLIC BLOOD PRESSURE: 102 MMHG | BODY MASS INDEX: 32.05 KG/M2 | OXYGEN SATURATION: 98 % | DIASTOLIC BLOOD PRESSURE: 64 MMHG | WEIGHT: 263.2 LBS

## 2021-04-15 DIAGNOSIS — I50.22 CHRONIC SYSTOLIC CONGESTIVE HEART FAILURE (H): Primary | ICD-10-CM

## 2021-04-15 DIAGNOSIS — E11.42 DIABETIC POLYNEUROPATHY ASSOCIATED WITH TYPE 2 DIABETES MELLITUS (H): Primary | ICD-10-CM

## 2021-04-15 DIAGNOSIS — K92.1 BLOOD IN STOOL: ICD-10-CM

## 2021-04-15 DIAGNOSIS — I48.20 CHRONIC ATRIAL FIBRILLATION (H): ICD-10-CM

## 2021-04-15 DIAGNOSIS — M20.41 HAMMERTOES OF BOTH FEET: ICD-10-CM

## 2021-04-15 DIAGNOSIS — I63.432 CEREBROVASCULAR ACCIDENT (CVA) DUE TO EMBOLISM OF LEFT POSTERIOR CEREBRAL ARTERY (H): ICD-10-CM

## 2021-04-15 DIAGNOSIS — E11.621 DIABETIC ULCER OF OTHER PART OF LEFT FOOT ASSOCIATED WITH TYPE 2 DIABETES MELLITUS, WITH FAT LAYER EXPOSED (H): ICD-10-CM

## 2021-04-15 DIAGNOSIS — E11.51 TYPE 2 DIABETES MELLITUS WITH DIABETIC PERIPHERAL ANGIOPATHY WITHOUT GANGRENE, WITH LONG-TERM CURRENT USE OF INSULIN (H): ICD-10-CM

## 2021-04-15 DIAGNOSIS — R06.02 SOB (SHORTNESS OF BREATH): ICD-10-CM

## 2021-04-15 DIAGNOSIS — Z89.421 H/O AMPUTATION OF LESSER TOE, RIGHT (H): ICD-10-CM

## 2021-04-15 DIAGNOSIS — M20.42 HAMMERTOES OF BOTH FEET: ICD-10-CM

## 2021-04-15 DIAGNOSIS — I25.10 CORONARY ARTERY DISEASE INVOLVING NATIVE CORONARY ARTERY OF NATIVE HEART WITHOUT ANGINA PECTORIS: ICD-10-CM

## 2021-04-15 DIAGNOSIS — I10 ESSENTIAL HYPERTENSION: ICD-10-CM

## 2021-04-15 DIAGNOSIS — R19.5 DARK STOOLS: ICD-10-CM

## 2021-04-15 DIAGNOSIS — Z79.4 TYPE 2 DIABETES MELLITUS WITH DIABETIC PERIPHERAL ANGIOPATHY WITHOUT GANGRENE, WITH LONG-TERM CURRENT USE OF INSULIN (H): ICD-10-CM

## 2021-04-15 DIAGNOSIS — I25.5 ISCHEMIC CARDIOMYOPATHY: ICD-10-CM

## 2021-04-15 DIAGNOSIS — I50.22 CHRONIC SYSTOLIC CONGESTIVE HEART FAILURE (H): ICD-10-CM

## 2021-04-15 DIAGNOSIS — L97.522 DIABETIC ULCER OF OTHER PART OF LEFT FOOT ASSOCIATED WITH TYPE 2 DIABETES MELLITUS, WITH FAT LAYER EXPOSED (H): ICD-10-CM

## 2021-04-15 LAB
ANION GAP SERPL CALCULATED.3IONS-SCNC: 6 MMOL/L (ref 3–14)
BUN SERPL-MCNC: 26 MG/DL (ref 7–30)
CALCIUM SERPL-MCNC: 8.4 MG/DL (ref 8.5–10.1)
CHLORIDE SERPL-SCNC: 102 MMOL/L (ref 94–109)
CO2 SERPL-SCNC: 29 MMOL/L (ref 20–32)
CREAT SERPL-MCNC: 1.29 MG/DL (ref 0.66–1.25)
ERYTHROCYTE [DISTWIDTH] IN BLOOD BY AUTOMATED COUNT: 13.5 % (ref 10–15)
GFR SERPL CREATININE-BSD FRML MDRD: 55 ML/MIN/{1.73_M2}
GLUCOSE SERPL-MCNC: 177 MG/DL (ref 70–99)
HCT VFR BLD AUTO: 35.3 % (ref 40–53)
HGB BLD-MCNC: 11.5 G/DL (ref 13.3–17.7)
MCH RBC QN AUTO: 29.4 PG (ref 26.5–33)
MCHC RBC AUTO-ENTMCNC: 32.6 G/DL (ref 31.5–36.5)
MCV RBC AUTO: 90 FL (ref 78–100)
NT-PROBNP SERPL-MCNC: 986 PG/ML (ref 0–125)
PLATELET # BLD AUTO: 232 10E9/L (ref 150–450)
POTASSIUM SERPL-SCNC: 3.7 MMOL/L (ref 3.4–5.3)
RBC # BLD AUTO: 3.91 10E12/L (ref 4.4–5.9)
SODIUM SERPL-SCNC: 137 MMOL/L (ref 133–144)
WBC # BLD AUTO: 6.8 10E9/L (ref 4–11)

## 2021-04-15 PROCEDURE — 36415 COLL VENOUS BLD VENIPUNCTURE: CPT | Performed by: NURSE PRACTITIONER

## 2021-04-15 PROCEDURE — 85027 COMPLETE CBC AUTOMATED: CPT | Performed by: NURSE PRACTITIONER

## 2021-04-15 PROCEDURE — 99214 OFFICE O/P EST MOD 30 MIN: CPT | Performed by: NURSE PRACTITIONER

## 2021-04-15 PROCEDURE — 80048 BASIC METABOLIC PNL TOTAL CA: CPT | Performed by: NURSE PRACTITIONER

## 2021-04-15 PROCEDURE — 11042 DBRDMT SUBQ TIS 1ST 20SQCM/<: CPT | Performed by: PODIATRIST

## 2021-04-15 PROCEDURE — 83880 ASSAY OF NATRIURETIC PEPTIDE: CPT | Performed by: NURSE PRACTITIONER

## 2021-04-15 ASSESSMENT — MIFFLIN-ST. JEOR
SCORE: 2055.37
SCORE: 2031.78

## 2021-04-15 NOTE — PROGRESS NOTES
"Podiatry / Foot and Ankle Surgery Progress Note    April 15, 2021    Subject: Patient was seen for follow up on left foot ulcers.  States he is to the.  He was recently released hospital due to heart issues again.  Denies fever, nausea, due to heart issues.  No concerns with the foot today.  He has been doing iodoform dressing changes.    Objective:  Vitals: /70   Ht 1.93 m (6' 4\")   Wt 117 kg (258 lb)   BMI 31.40 kg/m    BMI= Body mass index is 31.4 kg/m .    A1C: 8.1 (4/2021)    General:  Patient is alert and orientated.  NAD     Vascular:  DP and PT pulses are palpable.  No varicosities noted  CFT's < 3secs.  Skin temp is normal     Neuro:  Light and gross touch sensation absent to feet.      Derm: Thickened pre-ulcerative hyperkeratotic lesion to the plantar aspect of the left first metatarsal head.  Upon debridement there is a small partial-thickness ulcer with the fat layer exposed measuring 2.5cm x 3.0cm x 0.2cm.  No surrounding erythema purulent drainage or malodor noted..   Heavy clear serous drainage is noted with maceration around the ulcer.  No acute signs of infection noted.  Preulcerative hyperkeratotic lesion to the distal aspect of the right third toe.  No open lesion on debridement.  Small pressure area noted to the lateral right fifth met head.  Nothing is open.  Stage I pressure area.     Preulcerative hyperkeratotic lesion to the plantar aspect of the right first metatarsal head and IPJ.  No open lesions on debridement.  Patient does have a new cut on his right fifth toe.  This is superficial.  No redness or signs of acute infection noted.     Musculoskeletal: multiple previous toe amputations right foot.  Left second toe amputated previously.     Assessment:   Diabetic polyneuropathy associated with type 2 diabetes mellitus (H)  Diabetic ulcer of other part of left foot associated with type 2 diabetes mellitus, with fat layer exposed (H)  Pre-ulcerative calluses  Hammer toes of both " feet  H/O amputation of lesser toe, right (H)      Medical Decision Making/Plan: At this time, the ulcer was debrided.      He will switch to silvadene cream to left foot ulcer daily.   He notes that he does not need any more dressing supplies today.   Recommend that he lotion the right foot at least daily to help with the preulcerative calluses. We will have him follow-up in 1 month.  He will continue wearing his offloading shoe.  He was told to call further questions or concerns.     Procedure: After verbal consent, excisional debridement was performed on ulcer.  #15 blade was used to debride ulcer down to and including subcutaneous tissue. Bleeding controlled with light pressure.   No drainage noted.  No anesthesia was used due to neuropathy. Dry dressing applied to foot.  Patient tolerated procedure well.     Patient Risk Factor:  Patient is a  high risk factor for infection.     Татьяна Mckeon DPM, Podiatry/Foot and Ankle Surgery    Recommended to Jayro Elder to follow up with Primary Care provider regarding elevated blood pressure.

## 2021-04-15 NOTE — LETTER
Date:June 7, 2021      Patient was self referred, no letter generated. Do not send.        [facility] Health Information

## 2021-04-15 NOTE — PATIENT INSTRUCTIONS
Thank you for choosing [facility] Podiatry / Foot & Ankle Surgery!    DR. ASENCIO'S CLINIC:  [place] [facility] [place] SCHEDULE SURGERY: [phone] [address] BILLING QUESTIONS: [phone]   [place] [address] APPOINTMENTS: [phone]   PH: [phone] CONSUMER PRICE LINE:[phone]   FAX: [phone]      Follow up: in one month.

## 2021-04-15 NOTE — PATIENT INSTRUCTIONS
Thank you for your visit with [facility] today.    Today's plan:   1. Continue with your current medications.   2. We'll check blood counts today for further evaluate the dark stools. We'll update you by phone once we get these results back.  3. You should establish with a new primary care provider to discuss this further as it sounds like you are due for a colonoscopy.  4. Call the clinic if you have signs concerning for fluid retention, including weight gain of over 3 pounds in 1 night or over 5 pounds in 1 week, worsening shortness of breath, or worsening swelling in the legs or abdomen.   5. Follow up as planned with Dr. King next month.     Results:   Your kidney function electrolytes look stable today.  Component      Latest Ref Rng & Units 4/15/2021   Sodium      133 - 144 mmol/L 137   Potassium      3.4 - 5.3 mmol/L 3.7   Chloride      94 - 109 mmol/L 102   Carbon Dioxide      20 - 32 mmol/L 29   Anion Gap      3 - 14 mmol/L 6   Glucose      70 - 99 mg/dL 177 (H)   Urea Nitrogen      7 - 30 mg/dL 26   Creatinine      0.66 - 1.25 mg/dL 1.29 (H)   GFR Estimate      >60 mL/min/1.73:m2 55 (L)   GFR Estimate If Black      >60 mL/min/1.73:m2 64   Calcium      8.5 - 10.1 mg/dL 8.4 (L)       If you have questions or concerns, please do not hesitate to call my nurse Princess at [phone]    Scheduling phone number: [phone]    It was a pleasure seeing you today!     Vazquez Christensen, Nurse Practitioner  [facility]  April 15, 2021  ________________________________________________________

## 2021-04-15 NOTE — LETTER
4/15/2021    Physician No Ref-Primary  No address on file    RE: Jayro Elder       Dear Colleague,    I had the pleasure of seeing Jayro Elder in the [facility].      Cardiology Clinic Progress Note    C.O.R.E. Clinic Visit (Heart Failure Specialty Clinic)    Service Date: April 15, 2021    Primary Cardiology Team: Previously Dr. Tomlin and Gill Doty PA-C      Reason for Visit: Hospital follow up    HPI:   I had the pleasure of seeing Mr. Jayro Elder in the clinic today. He is a 70 year old gentleman with a complex past medical history including type 2 diabetes, hypothyroidism, hypertension, CVA, untreated sleep apnea, CKD, paroxsymal atrial fibrillation, coronary artery disease, status post multivessel PCI, ischemic cardiomyopathy, and osteomyelitis, status post lower extremity digit amputation.    He was recently admitted to [facility] on 04/07/2021 after presenting with shortness of breath and chest pain. An echocardiogram was completed on 4/8/2021 showing mildly reduced LV systolic function with EF 40-45%. Global right ventricular function is mildly reduced. This appeared stable with mild improvement in his LV function in comparison to the previous study. He was sent for coronary angiography patent previously placed stents with mild, non flow limiting in-stent restenosis of mid LAD which was non flow limiting FFR negative at 0.87. His previously placed proximal and mid RCA and OM 3 stents were also patent with mild in-stent restenosis. His symptoms were felt to be secondary to fluid overload and acute CHF. He was diuresed losing 9 pounds during his hospital stay down to 258 pounds at discharge with improvement in his symptoms.      Jayro tells me that he has been feeling okay since he has been home. He denies recurrent symptoms of chest pain. He has paroxsymal A.Fib and can sometimes feel a sensation of fluttering/palpitations which has been  stable. He otherwise denies concerns for lightheadedness, shortness of breath, significant exertional dyspnea, orthopnea, or PND. His weights at home have been stable at 255 to 258 pounds. His primary concern today is that he has been having dark stools. He denies any bright red blood in his stool or other concerns for bleeding. He is chronically anticoagulated on apixaban for secondary prevention for stroke with his history of A.Fib and CVAs. He is also on plavix given his history of CAD and previous stenting as outlined above.    He states that he was recently started on an iron supplement due to anemia and iron deficiency with a hemoglobin of around 10.2 during his recent hospitalization. He has not followed up with a primary care provider following his hospital stay as his reportedly retired recently and he hasn't been able to get in to establish care with someone new yet. He denies previous history of GI bleeding but I see previous documentation in his records that he is due for a colonoscopy.    A basic metabolic panel was checked prior to the visit today with potassium stable at 3.7, creatinine at 1.29, BUN 26, and GFR 55. A repeat NT pro BNP was also checked with a result of 986, down from 1,052 during his recent admission.     ASSESSMENT AND PLAN:  1. Chronic systolic congestive heart failure, ischemic cardiomyopathy  - EF 40-45% on most recent TTE.  - NYHA Class II symptoms. No signs or symptoms to suggest acute CHF. Appears euvolemic on exam today with home weights stable on his current diuretic regimen of lasix 80 mg in the morning and 40 mg in the afternoon with potassium 20 mEq once daily. Also on carvedilol 25 mg BID, lisinopril 5 mg daily, and imdur 30 mg daily.   - Recommend he continue with his current regimen without changes.    2. Coronary artery disease  - Status post multi-vessel stenting in the past. Coronary angiogram 04/08/21 showing mild in-stent restenosis but no flow limiting lesion with  continued medical management recommended.  - Currently free of chest pain.   - Continue with plavix 75 mg daily, beta blocker as above, atorvastatin 40 mg once daily, and imdur 30 mg once daily.    3. Essential hypertension  - Well-controlled. Continue current regimen.    4. Hyperlipidemia  - Treated on atorvastatin 40 mg once daily as above with most recent LDL cholesterol excellently controlled at 33 during his recent hospital stay last week.    5. Chronic atrial fibrillation  - [id number] Score significantly elevated at 7 (age, HTN, HF, hx of CVA, CAD). Chronically anticoagulated on apixaban 5 mg BID.  - Rate controlled on carvedilol 25 mg BID.    6. History of CVA    7. Type 2 diabetes mellitus     8. Chronic kidney disease  - Baseline creatinine of around 1.1 to 1.2. Stable on repeat BMP today near his baseline.    9. Dark stools  - Suspect this could just be secondary to recently starting on iron supplementation, but he is certainly at increased risk for possible GI bleed being on apixaban and plavix so would like to rule this out.   - Hemoglobin recently dropped from 13.9 to 10.2 suspected due to iron deficiency anemia/anemia of chronic disease.  - Will check a CBC today following the visit. Also recommended he establish care with a new PCP as he is due for routine colonoscopy.     Thank you for the opportunity to participate in this patient's care. He is scheduled to see Dr. King in about 1 month to establish cardiology care with him now that his previous primary Cardiologist, Dr. Tomlin, has retired. Repeat labs are scheduled for before that visit. I encouraged him to call the clinic with concerns in the meantime, and we would be happy to see him sooner if needed.     ADDENDUM:  CBC checked following the visit showing improvement in his hemoglobin from 10.2 to 11.5. Called Jayro to update him on the results and reviewed the good news that this suggests his dark stools are due to the iron supplement  rather than GI bleeding and that the iron supplement is helping with his anemia. Recommend he continue the plan as above. He stated understanding.     LORRAINE De Anda, CNP   Nurse Practitioner  [facility]  Pager: [phone]  Text Page  (8am - 5pm, M-F)    Orders this Visit:  Orders Placed This Encounter   Procedures     CBC with platelets     ED TO ESTABLISH PRIMARY CARE REFERRAL     No orders of the defined types were placed in this encounter.    There are no discontinued medications.  Encounter Diagnoses   Name Primary?     Chronic systolic congestive heart failure (H) Yes     Coronary artery disease involving native coronary artery of native heart without angina pectoris      Ischemic cardiomyopathy      Essential hypertension      Chronic atrial fibrillation (H)      Cerebrovascular accident (CVA) due to embolism of left posterior cerebral artery (H)      Type 2 diabetes mellitus with diabetic peripheral angiopathy without gangrene, with long-term current use of insulin (H)      Blood in stool      Dark stools      CURRENT MEDICATIONS:  Current Outpatient Medications   Medication Sig Dispense Refill     apixaban ANTICOAGULANT (ELIQUIS) 5 MG tablet Take 1 tablet (5 mg) by mouth 2 times daily       atorvastatin (LIPITOR) 40 MG tablet Take 1 tablet (40 mg) by mouth every evening (Patient taking differently: No sig reported) 90 tablet 3     Cadexomer Iodine, topical, 0.9% (IODOSORB) 0.9 % GEL gel Apply to ulcer on left foot every other day with gauze dressing. 40 g 0     carvedilol (COREG) 25 MG tablet Take 1 tablet (25 mg) by mouth 2 times daily (with meals) 180 tablet 3     clopidogrel (PLAVIX) 75 MG tablet Take 1 tablet (75 mg) by mouth daily (Patient taking differently: Take 1 tablet by mouth once daily) 90 tablet 3     Continuous Blood Gluc  (FREESTYLE CLARITA 2 READER SYSTM) DRAGAN 1 Device daily 1 each 3     Continuous Blood Gluc Sensor (FREESTYLE CLARITA 2 SENSOR SYSTM) MISC 1 Units  4 times daily (before meals and nightly) 1 each 3     ferrous sulfate (FEROSUL) 325 (65 Fe) MG tablet Take 1 tablet (325 mg) by mouth daily (with breakfast) 30 tablet 3     furosemide (LASIX) 40 MG tablet Take 80mg (2 tabs) every morning, and 40mg (1 tab) every afternoon. 90 tablet 4     gabapentin (NEURONTIN) 300 MG capsule Take 1-3 capsules by mouth once nightly at bedtime       insulin aspart (NOVOLOG FLEXPEN) 100 UNIT/ML pen Inject Subcutaneous 3 times daily (with meals) Sliding Scale  Do not give sliding scale insulin if Pre-Meal BG less than 140.   For Pre-Meal:   - 200 give 2 units.    - 250 give 4 units.    - 300 give 6 units.    - 350 give 8 units.    - 400 give 10 units.       insulin aspart (NOVOLOG PEN) 100 UNIT/ML pen Inject 12 Units Subcutaneous 2 times daily (with meals) With breakfast and lunch       insulin aspart (NOVOLOG PEN) 100 UNIT/ML pen Inject 14 Units Subcutaneous daily (with dinner) (Patient taking differently: Inject 14 units subcutaneously once daily with dinner)       insulin glargine (LANTUS PEN) 100 UNIT/ML pen Inject 44 units subcutaneously once every morning       insulin pen needle (31G X 6 MM) 31G X 6 MM miscellaneous Use  as directed. 100 each 11     isosorbide mononitrate (IMDUR) 30 MG 24 hr tablet Take 1 tablet (30 mg) by mouth daily (Patient taking differently: Take 1 tablet by mouth once daily) 90 tablet 1     levothyroxine (SYNTHROID, LEVOTHROID) 137 MCG tablet Take 1 tablet by mouth once every evening 90 tablet 3     lisinopril (ZESTRIL) 5 MG tablet Take 1 tablet (5 mg) by mouth daily 30 tablet 11     order for DME Equipment being ordered: size 12 surgical shoe 1 Device 0     order for DME Equipment being ordered: Walker Wheels () and Walker ()  Treatment Diagnosis: Impaired gait, heel WB bilaterally. 1 each 0     pantoprazole (PROTONIX) 40 MG EC tablet TAKE 1 TABLET BY MOUTH EVERY MORNING BEFORE BREAKFAST (Patient taking differently:  Take 1 tablet by mouth once every morning before breakfast) 90 tablet 1     potassium chloride ER (KLOR-CON M) 20 MEQ CR tablet Take 1 tablet (20 mEq) by mouth daily 30 tablet 11     Vitamin D, Cholecalciferol, 25 MCG (1000 UT) TABS Take 1 tablet by mouth once daily       nitroGLYcerin (NITROSTAT) 0.4 MG sublingual tablet For chest pain place 1 tablet under the tongue every 5 minutes for 3 doses. If symptoms persist 5 minutes after 1st dose call 911. (Patient not taking: Reported on 4/15/2021) 15 tablet 3     ALLERGIES  Allergies   Allergen Reactions     No Known Allergies        PAST MEDICAL, SURGICAL, FAMILY HISTORY:  History was reviewed and updated as needed, see medical record.    SOCIAL HISTORY:  Social History     Socioeconomic History     Marital status:      Spouse name: Not on file     Number of children: Not on file     Years of education: Not on file     Highest education level: Not on file   Occupational History     Not on file   Social Needs     Financial resource strain: Not on file     Food insecurity     Worry: Not on file     Inability: Not on file     Transportation needs     Medical: Not on file     Non-medical: Not on file   Tobacco Use     Smoking status: Former Smoker     Packs/day: 1.00     Years: 25.00     Pack years: 25.00     Types: Cigarettes     Start date: 1980     Quit date: 7/25/2005     Years since quitting: 15.7     Smokeless tobacco: Never Used   Substance and Sexual Activity     Alcohol use: Yes     Alcohol/week: 4.0 standard drinks     Types: 4 Shots of liquor per week     Frequency: Never     Comment: OCCASSIONALLY      Drug use: No     Sexual activity: Yes     Partners: Female   Lifestyle     Physical activity     Days per week: Not on file     Minutes per session: Not on file     Stress: Not on file   Relationships     Social connections     Talks on phone: Not on file     Gets together: Not on file     Attends [employer] service: Not on file     Active member of club or  "organization: Not on file     Attends meetings of clubs or organizations: Not on file     Relationship status: Not on file     Intimate partner violence     Fear of current or ex partner: Not on file     Emotionally abused: Not on file     Physically abused: Not on file     Forced sexual activity: Not on file   Other Topics Concern     Parent/sibling w/ CABG, MI or angioplasty before 65F 55M? Yes      Service Not Asked     Blood Transfusions Not Asked     Caffeine Concern No     Comment: 3 cups daily     Occupational Exposure Not Asked     Hobby Hazards Not Asked     Sleep Concern Not Asked     Stress Concern Not Asked     Weight Concern Not Asked     Special Diet Yes     Comment: watching carb intake     Back Care Not Asked     Exercise No     Comment: some walking     Bike Helmet Not Asked     Seat Belt Not Asked     Self-Exams Not Asked   Social History Narrative     Not on file     Review of Systems:  Skin:  Negative     Eyes:  Positive for glasses  ENT:  Positive for postnasal drainage  Respiratory:  Positive for dyspnea on exertion;CPAP;sleep apnea  Cardiovascular:    Positive for;chest pain;palpitations;dizziness;lightheadedness;fatigue  Gastroenterology: Positive for diarrhea  Genitourinary:  Positive for nocturia  Musculoskeletal:  Positive for arthritis;foot pain;nocturnal cramping  Neurologic:  Positive for headaches;numbness or tingling of feet  Psychiatric:  Positive for sleep disturbances  Heme/Lymph/Imm:  Positive for easy bruising  Endocrine:  Positive for diabetes;thyroid disorder     Physical Exam:  Vitals: /64   Pulse 68   Ht 1.93 m (6' 4\")   Wt 119.4 kg (263 lb 3.2 oz)   SpO2 98%   BMI 32.04 kg/m     Wt Readings from Last 4 Encounters:   04/15/21 119.4 kg (263 lb 3.2 oz)   04/15/21 117 kg (258 lb)   04/10/21 117.2 kg (258 lb 6.1 oz)   02/17/21 117.5 kg (259 lb)     CONSTITUTIONAL: Appears his stated age, well nourished, and in no acute distress.  HEENT: Pupils equal, round. " Sclerae nonicteric.    NECK: Supple, no masses or JVD appreciated.   C/V: Regular rate and rhythm, normal S1 and S2, no S3 or S4, no murmur, rub or gallop.   RESP: Respirations are unlabored. Lungs are clear to auscultation bilaterally without wheezing, rales, or rhonchi.  GI: Abdomen soft, non-tender, non-distended.  EXTREM: Trace LE edema bilaterally. No clubbing or cyanosis.  NEURO: Alert and oriented, cooperative. Gait steady. No gross focal deficits.   PSYCH: Affect appropriate. Mentation normal. Responds to questions appropriately.  SKIN: Warm and dry.    Recent Lab Results:  LIPID RESULTS:  Lab Results   Component Value Date    CHOL 79 04/08/2021    HDL 36 (L) 04/08/2021    LDL 33 04/08/2021    TRIG 52 04/08/2021    CHOLHDLRATIO 2.3 02/02/2015     LIVER ENZYME RESULTS:  Lab Results   Component Value Date    AST 14 04/08/2021    ALT 30 04/08/2021     CBC RESULTS:  Lab Results   Component Value Date    WBC 6.8 04/15/2021    RBC 3.91 (L) 04/15/2021    HGB 11.5 (L) 04/15/2021    HCT 35.3 (L) 04/15/2021    MCV 90 04/15/2021    MCH 29.4 04/15/2021    MCHC 32.6 04/15/2021    RDW 13.5 04/15/2021     04/15/2021     BMP RESULTS:  Lab Results   Component Value Date     04/15/2021    POTASSIUM 3.7 04/15/2021    CHLORIDE 102 04/15/2021    CO2 29 04/15/2021    ANIONGAP 6 04/15/2021     (H) 04/15/2021    BUN 26 04/15/2021    CR 1.29 (H) 04/15/2021    GFRESTIMATED 55 (L) 04/15/2021    GFRESTBLACK 64 04/15/2021    BETTIE 8.4 (L) 04/15/2021      A1C RESULTS:  Lab Results   Component Value Date    A1C 8.1 (H) 04/08/2021     INR RESULTS:  Lab Results   Component Value Date    INR 1.23 (H) 04/10/2021    INR 1.36 (H) 04/09/2021     CC  Livan King MD  [address] [place] [address]    This note was completed in part using Dragon voice recognition software. Although reviewed after completion, some word and grammatical errors may occur.       Thank you for allowing me to participate in the care of your  patient.      Sincerely,     Gael Christensen NP     [facility]  cc:   No referring provider defined for this encounter.

## 2021-04-15 NOTE — PROGRESS NOTES
Cardiology Clinic Progress Note    C.O.R.E. Clinic Visit ([facility])    Service Date: April 15, 2021    Primary Cardiology Team: Previously Dr. Tomlin and Gill Doty PA-C      Reason for Visit: Hospital follow up    HPI:   I had the pleasure of seeing Mr. Jayro Elder in the clinic today. He is a 70 year old gentleman with a complex past medical history including type 2 diabetes, hypothyroidism, hypertension, CVA, untreated sleep apnea, CKD, paroxsymal atrial fibrillation, coronary artery disease, status post multivessel PCI, ischemic cardiomyopathy, and osteomyelitis, status post lower extremity digit amputation.    He was recently admitted to [facility] on 04/07/2021 after presenting with shortness of breath and chest pain. An echocardiogram was completed on 4/8/2021 showing mildly reduced LV systolic function with EF 40-45%. Global right ventricular function is mildly reduced. This appeared stable with mild improvement in his LV function in comparison to the previous study. He was sent for coronary angiography patent previously placed stents with mild, non flow limiting in-stent restenosis of mid LAD which was non flow limiting FFR negative at 0.87. His previously placed proximal and mid RCA and OM 3 stents were also patent with mild in-stent restenosis. His symptoms were felt to be secondary to fluid overload and acute CHF. He was diuresed losing 9 pounds during his hospital stay down to 258 pounds at discharge with improvement in his symptoms.      Jayro tells me that he has been feeling okay since he has been home. He denies recurrent symptoms of chest pain. He has paroxsymal A.Fib and can sometimes feel a sensation of fluttering/palpitations which has been stable. He otherwise denies concerns for lightheadedness, shortness of breath, significant exertional dyspnea, orthopnea, or PND. His weights at home have been stable at 255 to 258 pounds. His primary concern today is that he has  been having dark stools. He denies any bright red blood in his stool or other concerns for bleeding. He is chronically anticoagulated on apixaban for secondary prevention for stroke with his history of A.Fib and CVAs. He is also on plavix given his history of CAD and previous stenting as outlined above.    He states that he was recently started on an iron supplement due to anemia and iron deficiency with a hemoglobin of around 10.2 during his recent hospitalization. He has not followed up with a primary care provider following his hospital stay as his reportedly retired recently and he hasn't been able to get in to establish care with someone new yet. He denies previous history of GI bleeding but I see previous documentation in his records that he is due for a colonoscopy.    A basic metabolic panel was checked prior to the visit today with potassium stable at 3.7, creatinine at 1.29, BUN 26, and GFR 55. A repeat NT pro BNP was also checked with a result of 986, down from 1,052 during his recent admission.     ASSESSMENT AND PLAN:  1. Chronic systolic congestive heart failure, ischemic cardiomyopathy  - EF 40-45% on most recent TTE.  - NYHA Class II symptoms. No signs or symptoms to suggest acute CHF. Appears euvolemic on exam today with home weights stable on his current diuretic regimen of lasix 80 mg in the morning and 40 mg in the afternoon with potassium 20 mEq once daily. Also on carvedilol 25 mg BID, lisinopril 5 mg daily, and imdur 30 mg daily.   - Recommend he continue with his current regimen without changes.    2. Coronary artery disease  - Status post multi-vessel stenting in the past. Coronary angiogram 04/08/21 showing mild in-stent restenosis but no flow limiting lesion with continued medical management recommended.  - Currently free of chest pain.   - Continue with plavix 75 mg daily, beta blocker as above, atorvastatin 40 mg once daily, and imdur 30 mg once daily.    3. Essential hypertension  -  Well-controlled. Continue current regimen.    4. Hyperlipidemia  - Treated on atorvastatin 40 mg once daily as above with most recent LDL cholesterol excellently controlled at 33 during his recent hospital stay last week.    5. Chronic atrial fibrillation  - [id number] Score significantly elevated at 7 (age, HTN, HF, hx of CVA, CAD). Chronically anticoagulated on apixaban 5 mg BID.  - Rate controlled on carvedilol 25 mg BID.    6. History of CVA    7. Type 2 diabetes mellitus     8. Chronic kidney disease  - Baseline creatinine of around 1.1 to 1.2. Stable on repeat BMP today near his baseline.    9. Dark stools  - Suspect this could just be secondary to recently starting on iron supplementation, but he is certainly at increased risk for possible GI bleed being on apixaban and plavix so would like to rule this out.   - Hemoglobin recently dropped from 13.9 to 10.2 suspected due to iron deficiency anemia/anemia of chronic disease.  - Will check a CBC today following the visit. Also recommended he establish care with a new PCP as he is due for routine colonoscopy.     Thank you for the opportunity to participate in this patient's care. He is scheduled to see Dr. King in about 1 month to establish cardiology care with him now that his previous primary Cardiologist, Dr. Tomlin, has retired. Repeat labs are scheduled for before that visit. I encouraged him to call the clinic with concerns in the meantime, and we would be happy to see him sooner if needed.     ADDENDUM:  CBC checked following the visit showing improvement in his hemoglobin from 10.2 to 11.5. Called Jayro to update him on the results and reviewed the good news that this suggests his dark stools are due to the iron supplement rather than GI bleeding and that the iron supplement is helping with his anemia. Recommend he continue the plan as above. He stated understanding.     Vazquez Christensen, APRN, CNP   Nurse Practitioner  [facility]  Care  Pager: [phone]  Text Page  (8am - 5pm, M-F)    Orders this Visit:  Orders Placed This Encounter   Procedures     CBC with platelets     ED TO ESTABLISH PRIMARY CARE REFERRAL     No orders of the defined types were placed in this encounter.    There are no discontinued medications.  Encounter Diagnoses   Name Primary?     Chronic systolic congestive heart failure (H) Yes     Coronary artery disease involving native coronary artery of native heart without angina pectoris      Ischemic cardiomyopathy      Essential hypertension      Chronic atrial fibrillation (H)      Cerebrovascular accident (CVA) due to embolism of left posterior cerebral artery (H)      Type 2 diabetes mellitus with diabetic peripheral angiopathy without gangrene, with long-term current use of insulin (H)      Blood in stool      Dark stools      CURRENT MEDICATIONS:  Current Outpatient Medications   Medication Sig Dispense Refill     apixaban ANTICOAGULANT (ELIQUIS) 5 MG tablet Take 1 tablet (5 mg) by mouth 2 times daily       atorvastatin (LIPITOR) 40 MG tablet Take 1 tablet (40 mg) by mouth every evening (Patient taking differently: No sig reported) 90 tablet 3     Cadexomer Iodine, topical, 0.9% (IODOSORB) 0.9 % GEL gel Apply to ulcer on left foot every other day with gauze dressing. 40 g 0     carvedilol (COREG) 25 MG tablet Take 1 tablet (25 mg) by mouth 2 times daily (with meals) 180 tablet 3     clopidogrel (PLAVIX) 75 MG tablet Take 1 tablet (75 mg) by mouth daily (Patient taking differently: Take 1 tablet by mouth once daily) 90 tablet 3     Continuous Blood Gluc  (FREESTYLE CLARITA 2 READER SYSTM) DRAGAN 1 Device daily 1 each 3     Continuous Blood Gluc Sensor (FREESTYLE CLARITA 2 SENSOR SYSTM) MISC 1 Units 4 times daily (before meals and nightly) 1 each 3     ferrous sulfate (FEROSUL) 325 (65 Fe) MG tablet Take 1 tablet (325 mg) by mouth daily (with breakfast) 30 tablet 3     furosemide (LASIX) 40 MG tablet Take 80mg (2  tabs) every morning, and 40mg (1 tab) every afternoon. 90 tablet 4     gabapentin (NEURONTIN) 300 MG capsule Take 1-3 capsules by mouth once nightly at bedtime       insulin aspart (NOVOLOG FLEXPEN) 100 UNIT/ML pen Inject Subcutaneous 3 times daily (with meals) Sliding Scale  Do not give sliding scale insulin if Pre-Meal BG less than 140.   For Pre-Meal:   - 200 give 2 units.    - 250 give 4 units.    - 300 give 6 units.    - 350 give 8 units.    - 400 give 10 units.       insulin aspart (NOVOLOG PEN) 100 UNIT/ML pen Inject 12 Units Subcutaneous 2 times daily (with meals) With breakfast and lunch       insulin aspart (NOVOLOG PEN) 100 UNIT/ML pen Inject 14 Units Subcutaneous daily (with dinner) (Patient taking differently: Inject 14 units subcutaneously once daily with dinner)       insulin glargine (LANTUS PEN) 100 UNIT/ML pen Inject 44 units subcutaneously once every morning       insulin pen needle (31G X 6 MM) 31G X 6 MM miscellaneous Use  as directed. 100 each 11     isosorbide mononitrate (IMDUR) 30 MG 24 hr tablet Take 1 tablet (30 mg) by mouth daily (Patient taking differently: Take 1 tablet by mouth once daily) 90 tablet 1     levothyroxine (SYNTHROID, LEVOTHROID) 137 MCG tablet Take 1 tablet by mouth once every evening 90 tablet 3     lisinopril (ZESTRIL) 5 MG tablet Take 1 tablet (5 mg) by mouth daily 30 tablet 11     order for DME Equipment being ordered: size 12 surgical shoe 1 Device 0     order for DME Equipment being ordered: Walker Wheels () and Walker ()  Treatment Diagnosis: Impaired gait, heel WB bilaterally. 1 each 0     pantoprazole (PROTONIX) 40 MG EC tablet TAKE 1 TABLET BY MOUTH EVERY MORNING BEFORE BREAKFAST (Patient taking differently: Take 1 tablet by mouth once every morning before breakfast) 90 tablet 1     potassium chloride ER (KLOR-CON M) 20 MEQ CR tablet Take 1 tablet (20 mEq) by mouth daily 30 tablet 11     Vitamin D, Cholecalciferol, 25 MCG  (1000 UT) TABS Take 1 tablet by mouth once daily       nitroGLYcerin (NITROSTAT) 0.4 MG sublingual tablet For chest pain place 1 tablet under the tongue every 5 minutes for 3 doses. If symptoms persist 5 minutes after 1st dose call 911. (Patient not taking: Reported on 4/15/2021) 15 tablet 3     ALLERGIES  Allergies   Allergen Reactions     No Known Allergies        PAST MEDICAL, SURGICAL, FAMILY HISTORY:  History was reviewed and updated as needed, see medical record.    SOCIAL HISTORY:  Social History     Socioeconomic History     Marital status:      Spouse name: Not on file     Number of children: Not on file     Years of education: Not on file     Highest education level: Not on file   Occupational History     Not on file   Social Needs     Financial resource strain: Not on file     Food insecurity     Worry: Not on file     Inability: Not on file     Transportation needs     Medical: Not on file     Non-medical: Not on file   Tobacco Use     Smoking status: Former Smoker     Packs/day: 1.00     Years: 25.00     Pack years: 25.00     Types: Cigarettes     Start date: 1980     Quit date: 7/25/2005     Years since quitting: 15.7     Smokeless tobacco: Never Used   Substance and Sexual Activity     Alcohol use: Yes     Alcohol/week: 4.0 standard drinks     Types: 4 Shots of liquor per week     Frequency: Never     Comment: OCCASSIONALLY      Drug use: No     Sexual activity: Yes     Partners: Female   Lifestyle     Physical activity     Days per week: Not on file     Minutes per session: Not on file     Stress: Not on file   Relationships     Social connections     Talks on phone: Not on file     Gets together: Not on file     Attends Protestant service: Not on file     Active member of club or organization: Not on file     Attends meetings of clubs or organizations: Not on file     Relationship status: Not on file     Intimate partner violence     Fear of current or ex partner: Not on file     Emotionally  "abused: Not on file     Physically abused: Not on file     Forced sexual activity: Not on file   Other Topics Concern     Parent/sibling w/ CABG, MI or angioplasty before 65F 55M? Yes      Service Not Asked     Blood Transfusions Not Asked     Caffeine Concern No     Comment: 3 cups daily     Occupational Exposure Not Asked     Hobby Hazards Not Asked     Sleep Concern Not Asked     Stress Concern Not Asked     Weight Concern Not Asked     Special Diet Yes     Comment: watching carb intake     Back Care Not Asked     Exercise No     Comment: some walking     Bike Helmet Not Asked     Seat Belt Not Asked     Self-Exams Not Asked   Social History Narrative     Not on file     Review of Systems:  Skin:  Negative     Eyes:  Positive for glasses  ENT:  Positive for postnasal drainage  Respiratory:  Positive for dyspnea on exertion;CPAP;sleep apnea  Cardiovascular:    Positive for;chest pain;palpitations;dizziness;lightheadedness;fatigue  Gastroenterology: Positive for diarrhea  Genitourinary:  Positive for nocturia  Musculoskeletal:  Positive for arthritis;foot pain;nocturnal cramping  Neurologic:  Positive for headaches;numbness or tingling of feet  Psychiatric:  Positive for sleep disturbances  Heme/Lymph/Imm:  Positive for easy bruising  Endocrine:  Positive for diabetes;thyroid disorder     Physical Exam:  Vitals: /64   Pulse 68   Ht 1.93 m (6' 4\")   Wt 119.4 kg (263 lb 3.2 oz)   SpO2 98%   BMI 32.04 kg/m     Wt Readings from Last 4 Encounters:   04/15/21 119.4 kg (263 lb 3.2 oz)   04/15/21 117 kg (258 lb)   04/10/21 117.2 kg (258 lb 6.1 oz)   02/17/21 117.5 kg (259 lb)     CONSTITUTIONAL: Appears his stated age, well nourished, and in no acute distress.  HEENT: Pupils equal, round. Sclerae nonicteric.    NECK: Supple, no masses or JVD appreciated.   C/V: Regular rate and rhythm, normal S1 and S2, no S3 or S4, no murmur, rub or gallop.   RESP: Respirations are unlabored. Lungs are clear to " auscultation bilaterally without wheezing, rales, or rhonchi.  GI: Abdomen soft, non-tender, non-distended.  EXTREM: Trace LE edema bilaterally. No clubbing or cyanosis.  NEURO: Alert and oriented, cooperative. Gait steady. No gross focal deficits.   PSYCH: Affect appropriate. Mentation normal. Responds to questions appropriately.  SKIN: Warm and dry.    Recent Lab Results:  LIPID RESULTS:  Lab Results   Component Value Date    CHOL 79 04/08/2021    HDL 36 (L) 04/08/2021    LDL 33 04/08/2021    TRIG 52 04/08/2021    CHOLHDLRATIO 2.3 02/02/2015     LIVER ENZYME RESULTS:  Lab Results   Component Value Date    AST 14 04/08/2021    ALT 30 04/08/2021     CBC RESULTS:  Lab Results   Component Value Date    WBC 6.8 04/15/2021    RBC 3.91 (L) 04/15/2021    HGB 11.5 (L) 04/15/2021    HCT 35.3 (L) 04/15/2021    MCV 90 04/15/2021    MCH 29.4 04/15/2021    MCHC 32.6 04/15/2021    RDW 13.5 04/15/2021     04/15/2021     BMP RESULTS:  Lab Results   Component Value Date     04/15/2021    POTASSIUM 3.7 04/15/2021    CHLORIDE 102 04/15/2021    CO2 29 04/15/2021    ANIONGAP 6 04/15/2021     (H) 04/15/2021    BUN 26 04/15/2021    CR 1.29 (H) 04/15/2021    GFRESTIMATED 55 (L) 04/15/2021    GFRESTBLACK 64 04/15/2021    BETTIE 8.4 (L) 04/15/2021      A1C RESULTS:  Lab Results   Component Value Date    A1C 8.1 (H) 04/08/2021     INR RESULTS:  Lab Results   Component Value Date    INR 1.23 (H) 04/10/2021    INR 1.36 (H) 04/09/2021     CC  Livan King MD  [address] [place] [id number]    This note was completed in part using Dragon voice recognition software. Although reviewed after completion, some word and grammatical errors may occur.

## 2021-04-15 NOTE — LETTER
"    4/15/2021         RE: Jayro Elder  [address] [place] [address]        Dear Colleague,    Thank you for referring your patient, Jayro Elder, to the [facility]. Please see a copy of my visit note below.    Podiatry / Foot and Ankle Surgery Progress Note    April 15, 2021    Subject: Patient was seen for follow up on left foot ulcers.  States he is to the.  He was recently released hospital due to heart issues again.  Denies fever, nausea, due to heart issues.  No concerns with the foot today.  He has been doing iodoform dressing changes.    Objective:  Vitals: /70   Ht 1.93 m (6' 4\")   Wt 117 kg (258 lb)   BMI 31.40 kg/m    BMI= Body mass index is 31.4 kg/m .    A1C: 8.1 (4/2021)    General:  Patient is alert and orientated.  NAD     Vascular:  DP and PT pulses are palpable.  No varicosities noted  CFT's < 3secs.  Skin temp is normal     Neuro:  Light and gross touch sensation absent to feet.      Derm: Thickened pre-ulcerative hyperkeratotic lesion to the plantar aspect of the left first metatarsal head.  Upon debridement there is a small partial-thickness ulcer with the fat layer exposed measuring 2.5cm x 3.0cm x 0.2cm.  No surrounding erythema purulent drainage or malodor noted..   Heavy clear serous drainage is noted with maceration around the ulcer.  No acute signs of infection noted.  Preulcerative hyperkeratotic lesion to the distal aspect of the right third toe.  No open lesion on debridement.  Small pressure area noted to the lateral right fifth met head.  Nothing is open.  Stage I pressure area.     Preulcerative hyperkeratotic lesion to the plantar aspect of the right first metatarsal head and IPJ.  No open lesions on debridement.  Patient does have a new cut on his right fifth toe.  This is superficial.  No redness or signs of acute infection noted.     Musculoskeletal: multiple previous toe amputations right foot.  Left second toe " amputated previously.     Assessment:   Diabetic polyneuropathy associated with type 2 diabetes mellitus (H)  Diabetic ulcer of other part of left foot associated with type 2 diabetes mellitus, with fat layer exposed (H)  Pre-ulcerative calluses  Hammer toes of both feet  H/O amputation of lesser toe, right (H)      Medical Decision Making/Plan: At this time, the ulcer was debrided.      He will switch to silvadene cream to left foot ulcer daily.   He notes that he does not need any more dressing supplies today.   Recommend that he lotion the right foot at least daily to help with the preulcerative calluses. We will have him follow-up in 1 month.  He will continue wearing his offloading shoe.  He was told to call further questions or concerns.     Procedure: After verbal consent, excisional debridement was performed on ulcer.  #15 blade was used to debride ulcer down to and including subcutaneous tissue. Bleeding controlled with light pressure.   No drainage noted.  No anesthesia was used due to neuropathy. Dry dressing applied to foot.  Patient tolerated procedure well.     Patient Risk Factor:  Patient is a  high risk factor for infection.     Татьяна Mckeon DPM, Podiatry/Foot and Ankle Surgery    Recommended to Jayro Elder to follow up with Primary Care provider regarding elevated blood pressure.        Again, thank you for allowing me to participate in the care of your patient.        Sincerely,        Татьяна Mckeon DPM, Podiatry/Foot and Ankle Surgery

## 2021-05-03 ENCOUNTER — APPOINTMENT (OUTPATIENT)
Dept: GENERAL RADIOLOGY | Facility: CLINIC | Age: 71
End: 2021-05-03
Attending: PHYSICIAN ASSISTANT
Payer: COMMERCIAL

## 2021-05-03 ENCOUNTER — HOSPITAL ENCOUNTER (EMERGENCY)
Facility: CLINIC | Age: 71
Discharge: HOME | End: 2021-05-03
Attending: PHYSICIAN ASSISTANT | Admitting: PHYSICIAN ASSISTANT
Payer: COMMERCIAL

## 2021-05-03 VITALS
SYSTOLIC BLOOD PRESSURE: 139 MMHG | RESPIRATION RATE: 18 BRPM | OXYGEN SATURATION: 99 % | DIASTOLIC BLOOD PRESSURE: 99 MMHG | HEART RATE: 74 BPM | TEMPERATURE: 97.5 F

## 2021-05-03 DIAGNOSIS — L08.9 DIABETIC FOOT INFECTION (H): ICD-10-CM

## 2021-05-03 DIAGNOSIS — E11.628 DIABETIC FOOT INFECTION (H): ICD-10-CM

## 2021-05-03 DIAGNOSIS — L97.509 ULCER OF TOE (H): ICD-10-CM

## 2021-05-03 LAB
ANION GAP SERPL CALCULATED.3IONS-SCNC: 4 MMOL/L (ref 3–14)
BASOPHILS # BLD AUTO: 0 10E9/L (ref 0–0.2)
BASOPHILS NFR BLD AUTO: 0.4 %
BUN SERPL-MCNC: 20 MG/DL (ref 7–30)
CALCIUM SERPL-MCNC: 8.7 MG/DL (ref 8.5–10.1)
CHLORIDE SERPL-SCNC: 102 MMOL/L (ref 94–109)
CO2 SERPL-SCNC: 31 MMOL/L (ref 20–32)
CREAT SERPL-MCNC: 1.22 MG/DL (ref 0.66–1.25)
DIFFERENTIAL METHOD BLD: ABNORMAL
EOSINOPHIL # BLD AUTO: 0.3 10E9/L (ref 0–0.7)
EOSINOPHIL NFR BLD AUTO: 6 %
ERYTHROCYTE [DISTWIDTH] IN BLOOD BY AUTOMATED COUNT: 13.1 % (ref 10–15)
GFR SERPL CREATININE-BSD FRML MDRD: 59 ML/MIN/{1.73_M2}
GLUCOSE SERPL-MCNC: 166 MG/DL (ref 70–99)
HCT VFR BLD AUTO: 33.7 % (ref 40–53)
HGB BLD-MCNC: 11.2 G/DL (ref 13.3–17.7)
IMM GRANULOCYTES # BLD: 0 10E9/L (ref 0–0.4)
IMM GRANULOCYTES NFR BLD: 0.2 %
LACTATE BLD-SCNC: 1.1 MMOL/L (ref 0.7–2)
LYMPHOCYTES # BLD AUTO: 1.1 10E9/L (ref 0.8–5.3)
LYMPHOCYTES NFR BLD AUTO: 23 %
MCH RBC QN AUTO: 29.2 PG (ref 26.5–33)
MCHC RBC AUTO-ENTMCNC: 33.2 G/DL (ref 31.5–36.5)
MCV RBC AUTO: 88 FL (ref 78–100)
MONOCYTES # BLD AUTO: 0.4 10E9/L (ref 0–1.3)
MONOCYTES NFR BLD AUTO: 8.1 %
NEUTROPHILS # BLD AUTO: 2.9 10E9/L (ref 1.6–8.3)
NEUTROPHILS NFR BLD AUTO: 62.3 %
NRBC # BLD AUTO: 0 10*3/UL
NRBC BLD AUTO-RTO: 0 /100
PLATELET # BLD AUTO: 202 10E9/L (ref 150–450)
POTASSIUM SERPL-SCNC: 3.5 MMOL/L (ref 3.4–5.3)
RBC # BLD AUTO: 3.83 10E12/L (ref 4.4–5.9)
SODIUM SERPL-SCNC: 137 MMOL/L (ref 133–144)
WBC # BLD AUTO: 4.7 10E9/L (ref 4–11)

## 2021-05-03 PROCEDURE — 99284 EMERGENCY DEPT VISIT MOD MDM: CPT

## 2021-05-03 PROCEDURE — 73630 X-RAY EXAM OF FOOT: CPT | Mod: LT

## 2021-05-03 PROCEDURE — 80048 BASIC METABOLIC PNL TOTAL CA: CPT | Performed by: EMERGENCY MEDICINE

## 2021-05-03 PROCEDURE — 83605 ASSAY OF LACTIC ACID: CPT | Performed by: EMERGENCY MEDICINE

## 2021-05-03 PROCEDURE — 85025 COMPLETE CBC W/AUTO DIFF WBC: CPT | Performed by: EMERGENCY MEDICINE

## 2021-05-03 RX ORDER — HYDROCODONE BITARTRATE AND ACETAMINOPHEN 5; 325 MG/1; MG/1
1 TABLET ORAL EVERY 6 HOURS PRN
Qty: 8 TABLET | Refills: 0 | Status: ON HOLD | OUTPATIENT
Start: 2021-05-03 | End: 2021-05-10

## 2021-05-03 RX ORDER — DOXYCYCLINE 100 MG/1
100 CAPSULE ORAL 2 TIMES DAILY
Qty: 14 CAPSULE | Refills: 0 | Status: ON HOLD | OUTPATIENT
Start: 2021-05-03 | End: 2021-05-10

## 2021-05-03 RX ORDER — CEPHALEXIN 500 MG/1
500 CAPSULE ORAL 4 TIMES DAILY
Qty: 28 CAPSULE | Refills: 0 | Status: ON HOLD | OUTPATIENT
Start: 2021-05-03 | End: 2021-05-10

## 2021-05-03 ASSESSMENT — ENCOUNTER SYMPTOMS: WOUND: 1

## 2021-05-03 NOTE — ED TRIAGE NOTES
"Patient presents to the ED stating \"my foot is infected.\" Reports redness, swelling, odor and drainage. States symptoms have been present for the past few days.   "

## 2021-05-03 NOTE — ED PROVIDER NOTES
History   Chief Complaint:  Wound Check     HPI   Jayro Elder is a 70 year old male with history of diabetes, diabetic ulcers, multiple prior toe amputations presents with some increased pain and redness of his left foot.  He localizes this mostly to the third toe and the lateral portion of the foot.  He has an ulcer over the bottom of foot that he has had for some time and he follows with podiatry for all of his wounds and injuries.  He states that the redness and pain has been present for at least a week to ten days but worse over the last 3-4 days..  He denies fever, chills, vomiting, or feeling otherwise unwell.  He denies any known injuries.  He is not currently on antibiotics.  He already has a wound care nurse that follows him. He denies any exposures to water recently.    Review of Systems   Skin: Positive for rash and wound.   All other systems reviewed and are negative.      Allergies:  No known drug allergies     Medications:  Eliquis   Atorvastatin   Coreg   Plavix  Lasix   Insulin aspart   Insulin glargine   Gabapentin    Imdur  Levothyroxine    Lisinopril   Nitrostat  Pantoprazole     Past Medical History:    CAD  Cancer  Cardiomyopathy    Cerebral infarction   Diabetic ulcer   MI  Neuropathy   Sepsis   Sleep apnea  Substance abuse   Cellulitis   Hypertension   Syncope    Thyroid disease  Non-toxic nodular goiter  Acute on chronic systolic congestive heart failure   Amputation of left foot   Atrial fibrillation   Unstable angina  Osteomyelitis   Homonymous hemianopsia, right    Past Surgical History:    Amputate toes, right and left   Angiogram x4  Back surgery   CV angiogram x2  Heart cath   Incision and drainage toe, bilateral   I and D of foot left   Soft tissue surgery   Orthopedic surgery    Family History:    Cancer, sister   Thyroid disease, brother   Cardiovascular, brother   Diabetes, brother   Cancer, father   Arhtritis, mother     Social History:  Smoking status: former  smoker  Alcohol use: yes  Drug use: no  Presents to the ED with self.      Physical Exam     Patient Vitals for the past 24 hrs:   BP Temp Pulse Resp SpO2   05/03/21 1929 (!) 139/99 -- 74 18 99 %   05/03/21 1920 (!) 139/99 -- -- -- --   05/03/21 1850 -- -- -- -- 99 %   05/03/21 1845 -- -- -- -- 99 %   05/03/21 1840 -- -- -- -- 99 %   05/03/21 1830 (!) 153/97 -- 74 -- 100 %   05/03/21 1820 -- -- -- -- 99 %   05/03/21 1815 -- -- -- -- 100 %   05/03/21 1800 (!) 157/96 -- -- -- --   05/03/21 1415 (!) 128/92 97.5  F (36.4  C) 81 16 99 %       Physical Exam  General: Alert and oriented.    Head: Normocephalic. External ears and nose normal.    Eyes: Pupils equal and round.  Normal tracking.    Pulmonary/Chest: Effort and rate normal.  No peripheral edema.    MSK: There is redness and swelling over left third toe as shown below w/ulcer does not probe to bone.  There is no fluctuance, crepitance or lymphangitic streaking. No generalized swelling of foot. S/p amputation left second toe.  Ulcer to plantar surface proximal to great to without redness, tenderness or drainage.    SKIN:  Warm and dry with strong DP or posterior tibial pulses and normal capillary refill.    NEURO:  Normal strength and sensation throughout the foot and toes.     PSYCH:  Normal affect                Emergency Department Course     Imaging:  Foot XR, G/E 3 views, left   Final Result   IMPRESSION: No fracture. No osteomyelitis. Prior partial amputation second ray at the level of the distal portion of the proximal phalanx. Moderate degenerative changes at the first MTP joint. Moderate degenerative changes at the navicular cuneiform    joints. Mild soft tissue swelling along the dorsal aspect of the metatarsal shafts.          Laboratory:  Labs Ordered and Resulted from Time of ED Arrival Up to the Time of Departure from the ED   CBC WITH PLATELETS DIFFERENTIAL - Abnormal; Notable for the following components:       Result Value    RBC Count 3.83 (*)      Hemoglobin 11.2 (*)     Hematocrit 33.7 (*)     All other components within normal limits   BASIC METABOLIC PANEL - Abnormal; Notable for the following components:    Glucose 166 (*)     GFR Estimate 59 (*)     All other components within normal limits   LACTIC ACID WHOLE BLOOD      Procedures    Emergency Department Course:  Reviewed:  I reviewed the patient's nursing notes, vitals, past medical records, Care Everywhere.     Assessments:   I performed an assessment and examination of the patient as noted above.      I re-checked the patient.  Findings  And plan explained to the patient.    Disposition:  The patient was discharged to home.       Impression & Plan   Medical Decision Making:  Jayro Elder is a 70 year old male with history of diabetes, foot ulcers, prior toe amputations presents with concerns for infection of toe of left foot.  On examination does have evidence of ulcer over the distal third toe with some redness of the toe consistent diabetic foot infection.  Afebrile and well-appearing without systemic symptoms, blood work with normal white count lactate.  X-ray without evidence of osteomyelitis and does not probe to bone.  No signs of abscess, necrotizing soft tissue infection, lymphangitis on exam.  Also has an ulcer over the plantar surface which does not appear infected.  Does not appear threatened and neurovascular status is intact.      Discussed options at this time the patient would like to trial course of outpatient antibiotics which I think is reasonable.  We will place him on Keflex as well as doxycycline for MRSA coverage given reported drainage.  Do not feel admission for IV antibiotics is indicated at this time.  He already has wound care nurses for home as well as podiatrist and I urged him to reach out to both of them tomorrow to arrange for follow-up as soon as possible in the next 1 to 2 days.  He will return immediately if he develops fever, chills, generalized malaise,  spread of redness, streaking, increasing pain/swelling or other new or worsening symptoms.      Covid-19  Jayro Elder was evaluated during a global COVID-19 pandemic, which necessitated consideration that the patient might be at risk for infection with the SARS-CoV-2 virus that causes COVID-19.   Applicable protocols for evaluation were followed during the patient's care.   COVID-19 was considered as part of the patient's evaluation. The plan for testing is:  a test was obtained during this visit.    Diagnosis:    ICD-10-CM    1. Diabetic foot infection (H)  E11.628     L08.9    2. Ulcer of toe (H)  L97.509        Discharge Medications:  Discharge Medication List as of 5/3/2021  7:31 PM      START taking these medications    Details   cephALEXin (KEFLEX) 500 MG capsule Take 1 capsule (500 mg) by mouth 4 times daily for 7 days, Disp-28 capsule, R-0, E-Prescribe      doxycycline hyclate (VIBRAMYCIN) 100 MG capsule Take 1 capsule (100 mg) by mouth 2 times daily for 7 days, Disp-14 capsule, R-0, E-Prescribe      HYDROcodone-acetaminophen (NORCO) 5-325 MG tablet Take 1 tablet by mouth every 6 hours as needed for moderate to severe pain, Disp-8 tablet, R-0, Local Print             Scribe Disclosure:  I, Mary Jo Luis, am serving as a scribe at 6:12 PM on 5/3/2021 to document services personally performed by Caden Humphreys PA-C based on my observations and the provider's statements to me.           Caden Humphreys PA-C  05/03/21 0194

## 2021-05-04 ENCOUNTER — TELEPHONE (OUTPATIENT)
Dept: PODIATRY | Facility: CLINIC | Age: 71
End: 2021-05-04

## 2021-05-04 DIAGNOSIS — L97.522 DIABETIC ULCER OF OTHER PART OF LEFT FOOT ASSOCIATED WITH TYPE 2 DIABETES MELLITUS, WITH FAT LAYER EXPOSED (H): ICD-10-CM

## 2021-05-04 DIAGNOSIS — E11.42 DIABETIC POLYNEUROPATHY ASSOCIATED WITH TYPE 2 DIABETES MELLITUS (H): Primary | ICD-10-CM

## 2021-05-04 DIAGNOSIS — E11.621 DIABETIC ULCER OF OTHER PART OF LEFT FOOT ASSOCIATED WITH TYPE 2 DIABETES MELLITUS, WITH FAT LAYER EXPOSED (H): ICD-10-CM

## 2021-05-04 RX ORDER — SILVER SULFADIAZINE 10 MG/G
CREAM TOPICAL DAILY
Qty: 25 G | Refills: 1 | Status: SHIPPED | OUTPATIENT
Start: 2021-05-04 | End: 2021-06-24

## 2021-05-04 NOTE — TELEPHONE ENCOUNTER
Patient's wife, Aixa, LM to say patient they went to ED for a toe issue. His toe has doubled in size and oozing. He has chills. She said they couldn't do anything for him and to schedule an appointment as soon as possible with a podiatrist.

## 2021-05-04 NOTE — TELEPHONE ENCOUNTER
Reviewed ED epic note in chart.  At this time we will order an MRI with contrast to assess for deeper bone infection.  Continue out the Keflex and the doxycycline.  We will also order Silvadene to apply to the ulcer daily with a gauze pad and tape or bandage.  Have him double booked with me Friday this afternoon to assess the wound.    Please let patient know.    Татьяна Mckeon DPM

## 2021-05-04 NOTE — TELEPHONE ENCOUNTER
"Attempted to reach patient on home number but no voicemail set up.   Phone call to patient on cell.   He states our office told him to go to the ED yesterday since there were no appointments available. Patient was there for 5 hrs before he was seen as the ED was swamped. He states he did not see a podiatrist on call and was sent home on antibiotics and told to call his podiatrist.     Patient has had increasing pain the past few days that wakes him up in the night. He normally does not feel pain due to neuropathy. He and wife are concerned stating the toe looks like his previous toes that had to be amputated. There is a bad odor with the drainage. They were not instructed on how to care for the wound at home. They have been changing the dressing only every other day. Wife Cleanses with Microcleanse and then uses Iodosorb most of the time.     Patient has been getting chills in the evenings the past 2 weeks where \"I shake\". Sometimes lasting all night.   Does not have a thermometer.  Also gets nauseated, weakness, dizziness and headache but can't tell writer how long but sounds like more than 2 weeks. He states he has told all his doctors these symptoms.     Patient started antibiotics just today.   Will discuss with provider and get back with them.     Discussed with Dr. Mckeon. She will place orders for MRI left foot with contrast. Patient most likely will need to have a COVID19 test done.  Wound care: cleanse with Microklenz spray, apply small dab of Silvadene cream, followed by 2x2 and tape DAILY.   Patient to continue antibiotics and follow up in clinic on 5/7/21 at 1 or 1:30 to be worked in. He is to go to the ED if symptoms worsen. Recommended they ask to have the Podiatrist on call paged.     Spoke to both patient and wife and discussed above recommendations from Dr. Mckeon. They will call now to get MRI scheduled. Patient denies having a pacemaker. Appointment scheduled for 5/7/21 at 1 pm. They verbalized " neli.     ROLANDA Bates RN

## 2021-05-05 ENCOUNTER — HOSPITAL ENCOUNTER (OUTPATIENT)
Dept: MRI IMAGING | Facility: CLINIC | Age: 71
Discharge: HOME | DRG: 617 | End: 2021-05-05
Attending: PODIATRIST | Admitting: PODIATRIST
Payer: COMMERCIAL

## 2021-05-05 DIAGNOSIS — E11.621 DIABETIC ULCER OF OTHER PART OF LEFT FOOT ASSOCIATED WITH TYPE 2 DIABETES MELLITUS, WITH FAT LAYER EXPOSED (H): ICD-10-CM

## 2021-05-05 DIAGNOSIS — E11.42 DIABETIC POLYNEUROPATHY ASSOCIATED WITH TYPE 2 DIABETES MELLITUS (H): ICD-10-CM

## 2021-05-05 DIAGNOSIS — L97.522 DIABETIC ULCER OF OTHER PART OF LEFT FOOT ASSOCIATED WITH TYPE 2 DIABETES MELLITUS, WITH FAT LAYER EXPOSED (H): ICD-10-CM

## 2021-05-05 PROCEDURE — 73720 MRI LWR EXTREMITY W/O&W/DYE: CPT | Mod: 26 | Performed by: RADIOLOGY

## 2021-05-05 PROCEDURE — 73720 MRI LWR EXTREMITY W/O&W/DYE: CPT | Mod: LT

## 2021-05-05 PROCEDURE — A9585 GADOBUTROL INJECTION: HCPCS | Performed by: PODIATRIST

## 2021-05-05 PROCEDURE — 255N000002 HC RX 255 OP 636: Performed by: PODIATRIST

## 2021-05-05 RX ORDER — GADOBUTROL 604.72 MG/ML
15 INJECTION INTRAVENOUS ONCE
Status: COMPLETED | OUTPATIENT
Start: 2021-05-05 | End: 2021-05-05

## 2021-05-05 RX ADMIN — GADOBUTROL 12 ML: 604.72 INJECTION INTRAVENOUS at 19:10

## 2021-05-07 ENCOUNTER — HOSPITAL ENCOUNTER (INPATIENT)
Facility: CLINIC | Age: 71
LOS: 3 days | Discharge: HOME | DRG: 617 | End: 2021-05-10
Attending: INTERNAL MEDICINE | Admitting: INTERNAL MEDICINE
Payer: COMMERCIAL

## 2021-05-07 ENCOUNTER — HOSPITAL ENCOUNTER (INPATIENT)
Facility: CLINIC | Age: 71
Setting detail: SURGERY ADMIT
DRG: 617 | End: 2021-05-07
Attending: PODIATRIST | Admitting: PODIATRIST
Payer: COMMERCIAL

## 2021-05-07 ENCOUNTER — OFFICE VISIT (OUTPATIENT)
Dept: PODIATRY | Facility: CLINIC | Age: 71
End: 2021-05-07
Payer: COMMERCIAL

## 2021-05-07 ENCOUNTER — TELEPHONE (OUTPATIENT)
Facility: CLINIC | Age: 71
End: 2021-05-07

## 2021-05-07 VITALS
BODY MASS INDEX: 31.54 KG/M2 | DIASTOLIC BLOOD PRESSURE: 96 MMHG | HEIGHT: 76 IN | SYSTOLIC BLOOD PRESSURE: 140 MMHG | WEIGHT: 259 LBS

## 2021-05-07 DIAGNOSIS — Z89.422 H/O AMPUTATION OF LESSER TOE, LEFT (H): ICD-10-CM

## 2021-05-07 DIAGNOSIS — L97.524 ULCER OF LEFT FOOT, WITH NECROSIS OF BONE (H): ICD-10-CM

## 2021-05-07 DIAGNOSIS — M86.9 OSTEOMYELITIS OF THIRD TOE OF LEFT FOOT (H): ICD-10-CM

## 2021-05-07 DIAGNOSIS — E11.42 DIABETIC POLYNEUROPATHY ASSOCIATED WITH TYPE 2 DIABETES MELLITUS (H): Primary | ICD-10-CM

## 2021-05-07 DIAGNOSIS — M86.9 OSTEOMYELITIS OF LEFT FOOT, UNSPECIFIED TYPE (H): ICD-10-CM

## 2021-05-07 DIAGNOSIS — E11.42 DIABETIC POLYNEUROPATHY ASSOCIATED WITH TYPE 2 DIABETES MELLITUS (H): ICD-10-CM

## 2021-05-07 DIAGNOSIS — L97.522 ULCER OF FOOT, LEFT, WITH FAT LAYER EXPOSED (H): Primary | ICD-10-CM

## 2021-05-07 DIAGNOSIS — M20.41 HAMMERTOES OF BOTH FEET: ICD-10-CM

## 2021-05-07 DIAGNOSIS — M20.42 HAMMERTOES OF BOTH FEET: ICD-10-CM

## 2021-05-07 LAB
ANION GAP SERPL CALCULATED.3IONS-SCNC: 2 MMOL/L (ref 3–14)
BASOPHILS # BLD AUTO: 0 10E9/L (ref 0–0.2)
BASOPHILS NFR BLD AUTO: 0.5 %
BUN SERPL-MCNC: 21 MG/DL (ref 7–30)
CALCIUM SERPL-MCNC: 8.7 MG/DL (ref 8.5–10.1)
CHLORIDE SERPL-SCNC: 102 MMOL/L (ref 94–109)
CO2 SERPL-SCNC: 31 MMOL/L (ref 20–32)
CREAT SERPL-MCNC: 1.12 MG/DL (ref 0.66–1.25)
DIFFERENTIAL METHOD BLD: ABNORMAL
EOSINOPHIL # BLD AUTO: 0.3 10E9/L (ref 0–0.7)
EOSINOPHIL NFR BLD AUTO: 5.2 %
ERYTHROCYTE [DISTWIDTH] IN BLOOD BY AUTOMATED COUNT: 13 % (ref 10–15)
GFR SERPL CREATININE-BSD FRML MDRD: 66 ML/MIN/{1.73_M2}
GLUCOSE BLDC GLUCOMTR-MCNC: 203 MG/DL (ref 70–99)
GLUCOSE BLDC GLUCOMTR-MCNC: 226 MG/DL (ref 70–99)
GLUCOSE SERPL-MCNC: 249 MG/DL (ref 70–99)
HCT VFR BLD AUTO: 35.5 % (ref 40–53)
HGB BLD-MCNC: 11.7 G/DL (ref 13.3–17.7)
IMM GRANULOCYTES # BLD: 0 10E9/L (ref 0–0.4)
IMM GRANULOCYTES NFR BLD: 0.3 %
LABORATORY COMMENT REPORT: NORMAL
LYMPHOCYTES # BLD AUTO: 1.4 10E9/L (ref 0.8–5.3)
LYMPHOCYTES NFR BLD AUTO: 23.3 %
MCH RBC QN AUTO: 28.6 PG (ref 26.5–33)
MCHC RBC AUTO-ENTMCNC: 33 G/DL (ref 31.5–36.5)
MCV RBC AUTO: 87 FL (ref 78–100)
MONOCYTES # BLD AUTO: 0.5 10E9/L (ref 0–1.3)
MONOCYTES NFR BLD AUTO: 8.8 %
NEUTROPHILS # BLD AUTO: 3.8 10E9/L (ref 1.6–8.3)
NEUTROPHILS NFR BLD AUTO: 61.9 %
NRBC # BLD AUTO: 0 10*3/UL
NRBC BLD AUTO-RTO: 0 /100
PLATELET # BLD AUTO: 225 10E9/L (ref 150–450)
POTASSIUM SERPL-SCNC: 3.9 MMOL/L (ref 3.4–5.3)
RBC # BLD AUTO: 4.09 10E12/L (ref 4.4–5.9)
SARS-COV-2 RNA RESP QL NAA+PROBE: NEGATIVE
SODIUM SERPL-SCNC: 135 MMOL/L (ref 133–144)
SPECIMEN SOURCE: NORMAL
WBC # BLD AUTO: 6.2 10E9/L (ref 4–11)

## 2021-05-07 PROCEDURE — 36415 COLL VENOUS BLD VENIPUNCTURE: CPT | Performed by: INTERNAL MEDICINE

## 2021-05-07 PROCEDURE — 85025 COMPLETE CBC W/AUTO DIFF WBC: CPT | Performed by: INTERNAL MEDICINE

## 2021-05-07 PROCEDURE — 999N001017 HC STATISTIC GLUCOSE BY METER IP

## 2021-05-07 PROCEDURE — 99214 OFFICE O/P EST MOD 30 MIN: CPT | Performed by: PODIATRIST

## 2021-05-07 PROCEDURE — 258N000003 HC RX IP 258 OP 636: Performed by: INTERNAL MEDICINE

## 2021-05-07 PROCEDURE — 120N000001 HC R&B MED SURG/OB

## 2021-05-07 PROCEDURE — 250N000012 HC RX MED GY IP 250 OP 636 PS 637: Performed by: INTERNAL MEDICINE

## 2021-05-07 PROCEDURE — 99223 1ST HOSP IP/OBS HIGH 75: CPT | Mod: AI | Performed by: INTERNAL MEDICINE

## 2021-05-07 PROCEDURE — 250N000011 HC RX IP 250 OP 636: Performed by: INTERNAL MEDICINE

## 2021-05-07 PROCEDURE — 80048 BASIC METABOLIC PNL TOTAL CA: CPT | Performed by: INTERNAL MEDICINE

## 2021-05-07 PROCEDURE — 250N000013 HC RX MED GY IP 250 OP 250 PS 637: Performed by: INTERNAL MEDICINE

## 2021-05-07 PROCEDURE — 87635 SARS-COV-2 COVID-19 AMP PRB: CPT | Performed by: INTERNAL MEDICINE

## 2021-05-07 RX ORDER — ATORVASTATIN CALCIUM 40 MG/1
40 TABLET, FILM COATED ORAL EVERY EVENING
Status: DISCONTINUED | OUTPATIENT
Start: 2021-05-07 | End: 2021-05-10 | Stop reason: HOSPADM

## 2021-05-07 RX ORDER — NICOTINE POLACRILEX 4 MG
15-30 LOZENGE BUCCAL
Status: DISCONTINUED | OUTPATIENT
Start: 2021-05-07 | End: 2021-05-10 | Stop reason: HOSPADM

## 2021-05-07 RX ORDER — CEFAZOLIN SODIUM 2 G/100ML
2 INJECTION, SOLUTION INTRAVENOUS
Status: CANCELLED | OUTPATIENT
Start: 2021-05-07

## 2021-05-07 RX ORDER — ONDANSETRON 2 MG/ML
4 INJECTION INTRAMUSCULAR; INTRAVENOUS EVERY 6 HOURS PRN
Status: DISCONTINUED | OUTPATIENT
Start: 2021-05-07 | End: 2021-05-10 | Stop reason: HOSPADM

## 2021-05-07 RX ORDER — LIDOCAINE 40 MG/G
CREAM TOPICAL
Status: DISCONTINUED | OUTPATIENT
Start: 2021-05-07 | End: 2021-05-10 | Stop reason: HOSPADM

## 2021-05-07 RX ORDER — PANTOPRAZOLE SODIUM 40 MG/1
40 TABLET, DELAYED RELEASE ORAL
Status: DISCONTINUED | OUTPATIENT
Start: 2021-05-08 | End: 2021-05-10 | Stop reason: HOSPADM

## 2021-05-07 RX ORDER — ACETAMINOPHEN 650 MG/1
650 SUPPOSITORY RECTAL EVERY 4 HOURS PRN
Status: DISCONTINUED | OUTPATIENT
Start: 2021-05-07 | End: 2021-05-10 | Stop reason: HOSPADM

## 2021-05-07 RX ORDER — FUROSEMIDE 40 MG
40 TABLET ORAL
Status: DISCONTINUED | OUTPATIENT
Start: 2021-05-08 | End: 2021-05-10 | Stop reason: HOSPADM

## 2021-05-07 RX ORDER — AMOXICILLIN 250 MG
2 CAPSULE ORAL 2 TIMES DAILY PRN
Status: DISCONTINUED | OUTPATIENT
Start: 2021-05-07 | End: 2021-05-10 | Stop reason: HOSPADM

## 2021-05-07 RX ORDER — POLYETHYLENE GLYCOL 3350 17 G/17G
17 POWDER, FOR SOLUTION ORAL DAILY PRN
Status: DISCONTINUED | OUTPATIENT
Start: 2021-05-07 | End: 2021-05-10 | Stop reason: HOSPADM

## 2021-05-07 RX ORDER — LEVOTHYROXINE SODIUM 137 UG/1
137 TABLET ORAL DAILY
Status: DISCONTINUED | OUTPATIENT
Start: 2021-05-08 | End: 2021-05-10 | Stop reason: HOSPADM

## 2021-05-07 RX ORDER — OXYCODONE HYDROCHLORIDE 5 MG/1
5-10 TABLET ORAL
Status: DISCONTINUED | OUTPATIENT
Start: 2021-05-07 | End: 2021-05-10 | Stop reason: HOSPADM

## 2021-05-07 RX ORDER — ISOSORBIDE MONONITRATE 30 MG/1
30 TABLET, EXTENDED RELEASE ORAL DAILY
Status: DISCONTINUED | OUTPATIENT
Start: 2021-05-08 | End: 2021-05-10 | Stop reason: HOSPADM

## 2021-05-07 RX ORDER — FERROUS SULFATE 325(65) MG
325 TABLET ORAL
Status: DISCONTINUED | OUTPATIENT
Start: 2021-05-08 | End: 2021-05-10 | Stop reason: HOSPADM

## 2021-05-07 RX ORDER — CEFAZOLIN SODIUM 1 G/50ML
2000 SOLUTION INTRAVENOUS EVERY 12 HOURS
Status: DISCONTINUED | OUTPATIENT
Start: 2021-05-07 | End: 2021-05-10

## 2021-05-07 RX ORDER — NALOXONE HYDROCHLORIDE 0.4 MG/ML
0.2 INJECTION, SOLUTION INTRAMUSCULAR; INTRAVENOUS; SUBCUTANEOUS
Status: DISCONTINUED | OUTPATIENT
Start: 2021-05-07 | End: 2021-05-10 | Stop reason: HOSPADM

## 2021-05-07 RX ORDER — AMOXICILLIN 250 MG
1 CAPSULE ORAL 2 TIMES DAILY PRN
Status: DISCONTINUED | OUTPATIENT
Start: 2021-05-07 | End: 2021-05-10 | Stop reason: HOSPADM

## 2021-05-07 RX ORDER — ONDANSETRON 4 MG/1
4 TABLET, ORALLY DISINTEGRATING ORAL EVERY 6 HOURS PRN
Status: DISCONTINUED | OUTPATIENT
Start: 2021-05-07 | End: 2021-05-10 | Stop reason: HOSPADM

## 2021-05-07 RX ORDER — CARVEDILOL 25 MG/1
25 TABLET ORAL 2 TIMES DAILY WITH MEALS
Status: DISCONTINUED | OUTPATIENT
Start: 2021-05-08 | End: 2021-05-10 | Stop reason: HOSPADM

## 2021-05-07 RX ORDER — DEXTROSE MONOHYDRATE 25 G/50ML
25-50 INJECTION, SOLUTION INTRAVENOUS
Status: DISCONTINUED | OUTPATIENT
Start: 2021-05-07 | End: 2021-05-10 | Stop reason: HOSPADM

## 2021-05-07 RX ORDER — ACETAMINOPHEN 325 MG/1
650 TABLET ORAL EVERY 4 HOURS PRN
Status: DISCONTINUED | OUTPATIENT
Start: 2021-05-07 | End: 2021-05-10 | Stop reason: HOSPADM

## 2021-05-07 RX ORDER — NITROGLYCERIN 0.4 MG/1
0.4 TABLET SUBLINGUAL EVERY 5 MIN PRN
Status: DISCONTINUED | OUTPATIENT
Start: 2021-05-07 | End: 2021-05-10 | Stop reason: HOSPADM

## 2021-05-07 RX ORDER — CEFAZOLIN SODIUM 2 G/100ML
2 INJECTION, SOLUTION INTRAVENOUS SEE ADMIN INSTRUCTIONS
Status: CANCELLED | OUTPATIENT
Start: 2021-05-07

## 2021-05-07 RX ORDER — NALOXONE HYDROCHLORIDE 0.4 MG/ML
0.4 INJECTION, SOLUTION INTRAMUSCULAR; INTRAVENOUS; SUBCUTANEOUS
Status: DISCONTINUED | OUTPATIENT
Start: 2021-05-07 | End: 2021-05-10 | Stop reason: HOSPADM

## 2021-05-07 RX ORDER — LISINOPRIL 5 MG/1
5 TABLET ORAL DAILY
Status: DISCONTINUED | OUTPATIENT
Start: 2021-05-08 | End: 2021-05-09

## 2021-05-07 RX ORDER — POTASSIUM CHLORIDE 1500 MG/1
20 TABLET, EXTENDED RELEASE ORAL DAILY
Status: DISCONTINUED | OUTPATIENT
Start: 2021-05-08 | End: 2021-05-10 | Stop reason: HOSPADM

## 2021-05-07 RX ADMIN — OXYCODONE HYDROCHLORIDE 10 MG: 5 TABLET ORAL at 22:05

## 2021-05-07 RX ADMIN — VANCOMYCIN HYDROCHLORIDE 2000 MG: 10 INJECTION, POWDER, LYOPHILIZED, FOR SOLUTION INTRAVENOUS at 18:50

## 2021-05-07 RX ADMIN — INSULIN ASPART 3 UNITS: 100 INJECTION, SOLUTION INTRAVENOUS; SUBCUTANEOUS at 18:57

## 2021-05-07 ASSESSMENT — ACTIVITIES OF DAILY LIVING (ADL): ADLS_ACUITY_SCORE: 15

## 2021-05-07 ASSESSMENT — MIFFLIN-ST. JEOR: SCORE: 2036.32

## 2021-05-07 NOTE — H&P
[facility]  History and Physical  Hospitalist - Vinnie Reinoso DO       Date of Admission:  5/7/2021    Chief Complaint   Left Foot Wound    History is obtained from the patient as well as the emergency medical record.    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  The patient has had chronic left foot wounds for the past several months.  He reports over the last few weeks that these have worsened.  He presented to the emergency department on 5/3/2021 with concern for possible infection of his left foot.  The patient was discharged with cephalexin and doxycycline.  The patient was seen today by his podiatrist who performed an MRI that showed subacute appearing fifth proximal phalangeal base fracture and subchronic appearing fourth proximal phalangeal shaft/base fracture, edema-like marrow signal intensity of the third distal phalanx with associated enhancement and mild T1 hypointensity without associated MRI evidence of deep soft tissue defect concerning for osteomyelitis.  At that point the patient was directly admitted [facility] for debridement/amputation.  The patient denies any fevers however does admit to some subjective chills.  He does admit that his left foot is a little painful.  The patient was recently hospitalized at [facility] for atypical chest pain.  The patient underwent a coronary angiogram that showed multiple patent stents with nonobstructive  coronary artery disease.  The patient was treated at that time for acute exacerbation of his heart failure with reduced ejection fraction.    ASSESSMENT/PLAN    1.  Left Foot Osteomyelitis  - Check CBC and BMP  - Start Vancomycin  - Consult Podiatry - plan for OR at 1030 tomorrow  - NPO  - Check Covid PCR  - Consult Wound Care    2.  Uncontrolled Type 2 Diabetes Mellitus with Nephropathy and Neuropathy  - A1C = 8.1 on 4/8/2021  - Resume home lantus and novolog once confirmed by pharmacy  - Add insulin sliding scale    3.  Paroxysmal Atrial Fibrillation on Eliquis  - Hold Eliquis  - Telemetry    Chronic Medical Problems:  HFrEF - EF 40%  Coronary Artery Disease - hold Plavix  Chronic Kidney Disease Stage 3 - Baseline Cr = 1.2-1.3  Hypertension  Hyperlipidemia  Hypothyroidism  GERD  Iron Deficiency Anemia  Hx of Nicotine Dependence with Cigarettes    PLAN: Resume home medications as appropriate once confirmed by pharmacy.     DVT Prophylaxis: Pneumatic Compression Devices  Code Status: Full Code  Discharge Plan: Expected discharge: 2 - 3 days, recommended to prior living arrangement once antibiotic plan established.      Vinnie Reinoso, DO    Primary Care Physician   Physician No Ref-Primary    -----------------------------------------------------------------------------------------------------------------------------------------------------------------------------------------------------    Past Medical History    I have reviewed this patient's medical history and updated it with pertinent information if needed.   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     Cardiomyopathy (H)      Cellulitis of left lower extremity 3/13/2018     Cerebral infarction (H)     eye sight decreased in peripheral of right side and blurry     Diabetic ulcer of left midfoot associated with type 2  diabetes mellitus, with fat layer exposed (H) 3/13/2018     Essential hypertension, benign 11/13/2002     Generalized osteoarthrosis, unspecified site 11/13/2002     Microalbuminuria 3/13/2018    X1     Myocardial infarction (H)      Neuropathy      Sepsis (H)      Sleep apnea     doesn't use it     Substance abuse (H)      Syncope, unspecified syncope type 3/13/2018     Thyroid disease     takes medicine     Tobacco use disorder 11/13/2002     Type 2 diabetes mellitus with diabetic peripheral angiopathy without gangrene, unspecified long term insulin use status 2005       Past Surgical History   I have reviewed this patient's surgical history and updated it with pertinent information if needed.  Past Surgical History:   Procedure Laterality Date     AMPUTATE TOE(S) Right 1/6/2019    Procedure: Right open second toe partial amputation;  Surgeon: Sabino Garcia DPM;  Location: [facility] OR     AMPUTATE TOE(S) Right 1/8/2019    Procedure: 1.  Revision right second toe amputation with resection of distal half of proximal phalanx with full closure.    2.  Debridement of callus/preulcerative lesion, distal left second toe and plantar left first metatarsophalangeal joint.;  Surgeon: Ryan Bhagat DPM;  Location: [facility] OR     AMPUTATE TOE(S) Right 3/12/2019    Procedure: Partial right fourth toe amputation.;  Surgeon: Ryan Bhagat DPM;  Location: [facility] OR     AMPUTATE TOE(S) Right 5/6/2019    Procedure: Right partial third toe amputation;  Surgeon: Татьяна Mckeon DPM, Podiatry/Foot and Ankle Surgery;  Location: [facility] OR     AMPUTATE TOE(S) Left 4/18/2020    Procedure: LEFT SECOND TOE AMPUTATION;  Surgeon: Ryan Bhagat DPM;  Location:  OR     ANGIOGRAM  6/29/05    subtotal occ.mid LAD,SUSAN mid LAD     ANGIOGRAM  7/05    mild CAD,patent stent,no flow-limiting lesions,sev.LV dysf.LAD enlarged     ANGIOGRAM  2/09    Sev.single vessel disease,Mod LV dysf.distal LAD 90%,70-75% mid lad just before prev stent,SUSAN to  prox.mid LAD, endeavor to distal LAD     ANGIOGRAM  11/13/13    restenosis, stent LAD     BACK SURGERY      back surgery x3     CV CORONARY ANGIOGRAM N/A 7/8/2019    Procedure: Coronary Angiogram;  Surgeon: Jose Alfredo Crockett MD;  Location:  HEART CARDIAC CATH LAB     CV CORONARY ANGIOGRAM N/A 4/9/2021    Procedure: CV CORONARY ANGIOGRAM;  Surgeon: Warren Paulson MD;  Location:  HEART CARDIAC CATH LAB     CV LEFT HEART CATH N/A 7/8/2019    Procedure: Left Heart Cath;  Surgeon: Jose Alfredo Crockett MD;  Location:  HEART CARDIAC CATH LAB     CV PCI ASPIRATION THROMECTOMY N/A 7/8/2019    Procedure: PCI Aspiration Thrombectomy;  Surgeon: Jose Alfredo Crockett MD;  Location:  HEART CARDIAC CATH LAB     CV PCI STENT DRUG ELUTING N/A 7/8/2019    Procedure: PCI Stent Drug Eluting;  Surgeon: Jose Alfredo Crockett MD;  Location:  HEART CARDIAC CATH LAB     HEART CATH LEFT HEART CATH  06/13/2017    SUSAN to OM3     INCISION AND DRAINAGE TOE, COMBINED Bilateral 9/11/2018    Procedure: COMBINED INCISION AND DRAINAGE TOE;  1) Irrigation and debridement ulcer left great toe  2) Amputation 3rd toe right foot at distal interphalangeal joint level;  Surgeon: Miki Harp MD;  Location:  OR     IRRIGATION AND DEBRIDEMENT FOOT, COMBINED Left 3/12/2019    Procedure: Debridement of left foot ulcer sub first MPJ 2 x 2.5 cm down to and including subcutaneous tissue, callus debridement for preulcerative lesion, left second toe.;  Surgeon: Ryan Bhagat DPM;  Location:  OR     ORTHOPEDIC SURGERY      bunion surgery both feet     ORTHOPEDIC SURGERY      left shoulder     SOFT TISSUE SURGERY      debridement of toe numerous time     VASCULAR SURGERY      7 stents in heart     [facility] NONSPECIFIC PROCEDURE      Laminectomy x 3 - (1983 x 2 & 1990)     [facility] NONSPECIFIC PROCEDURE Bilateral 1998    Bunionectomy     [facility] NONSPECIFIC PROCEDURE  1959    Gingival surgery at age 9       Prior to Admission Medications   Prior to  Admission Medications   Prescriptions Last Dose Informant Patient Reported? Taking?   Cadexomer Iodine, topical, 0.9% (IODOSORB) 0.9 % GEL gel   No No   Sig: Apply to ulcer on left foot every other day with gauze dressing.   Continuous Blood Gluc  (FREESTYLE CLARITA 2 READER SYSTM) DRAGAN   No No   Si Device daily   Continuous Blood Gluc Sensor (FREESTYLE CLARITA 2 SENSOR SYSTM) MISC   No No   Si Units 4 times daily (before meals and nightly)   HYDROcodone-acetaminophen (NORCO) 5-325 MG tablet   No No   Sig: Take 1 tablet by mouth every 6 hours as needed for moderate to severe pain   Vitamin D, Cholecalciferol, 25 MCG (1000 UT) TABS   Yes No   Sig: Take 1 tablet by mouth once daily   apixaban ANTICOAGULANT (ELIQUIS) 5 MG tablet   No No   Sig: Take 1 tablet (5 mg) by mouth 2 times daily   atorvastatin (LIPITOR) 40 MG tablet   No No   Sig: Take 1 tablet (40 mg) by mouth every evening   Patient taking differently: No sig reported   carvedilol (COREG) 25 MG tablet   No No   Sig: Take 1 tablet (25 mg) by mouth 2 times daily (with meals)   cephALEXin (KEFLEX) 500 MG capsule   No No   Sig: Take 1 capsule (500 mg) by mouth 4 times daily for 7 days   clopidogrel (PLAVIX) 75 MG tablet   No No   Sig: Take 1 tablet (75 mg) by mouth daily   Patient taking differently: Take 1 tablet by mouth once daily   doxycycline hyclate (VIBRAMYCIN) 100 MG capsule   No No   Sig: Take 1 capsule (100 mg) by mouth 2 times daily for 7 days   ferrous sulfate (FEROSUL) 325 (65 Fe) MG tablet   No No   Sig: Take 1 tablet (325 mg) by mouth daily (with breakfast)   furosemide (LASIX) 40 MG tablet   No No   Sig: Take 80mg (2 tabs) every morning, and 40mg (1 tab) every afternoon.   gabapentin (NEURONTIN) 300 MG capsule   Yes No   Sig: Take 1-3 capsules by mouth once nightly at bedtime   insulin aspart (NOVOLOG FLEXPEN) 100 UNIT/ML pen   Yes No   Sig: Inject Subcutaneous 3 times daily (with meals) Sliding Scale  Do not give sliding scale insulin  if Pre-Meal BG less than 140.   For Pre-Meal:   - 200 give 2 units.    - 250 give 4 units.    - 300 give 6 units.    - 350 give 8 units.    - 400 give 10 units.   insulin aspart (NOVOLOG PEN) 100 UNIT/ML pen   No No   Sig: Inject 12 Units Subcutaneous 2 times daily (with meals) With breakfast and lunch   insulin aspart (NOVOLOG PEN) 100 UNIT/ML pen   No No   Sig: Inject 14 Units Subcutaneous daily (with dinner)   Patient taking differently: Inject 14 units subcutaneously once daily with dinner   insulin glargine (LANTUS PEN) 100 UNIT/ML pen   Yes No   Sig: Inject 44 units subcutaneously once every morning   insulin pen needle (31G X 6 MM) 31G X 6 MM miscellaneous   No No   Sig: Use  as directed.   isosorbide mononitrate (IMDUR) 30 MG 24 hr tablet   No No   Sig: Take 1 tablet (30 mg) by mouth daily   Patient taking differently: Take 1 tablet by mouth once daily   levothyroxine (SYNTHROID, LEVOTHROID) 137 MCG tablet  Self Yes No   Sig: Take 1 tablet by mouth once every evening   lisinopril (ZESTRIL) 5 MG tablet   No No   Sig: Take 1 tablet (5 mg) by mouth daily   nitroGLYcerin (NITROSTAT) 0.4 MG sublingual tablet   No No   Sig: For chest pain place 1 tablet under the tongue every 5 minutes for 3 doses. If symptoms persist 5 minutes after 1st dose call 911.   order for DME  Self No No   Sig: Equipment being ordered: Walker Wheels () and Walker ()  Treatment Diagnosis: Impaired gait, heel WB bilaterally.   order for DME   No No   Sig: Equipment being ordered: size 12 surgical shoe   pantoprazole (PROTONIX) 40 MG EC tablet   No No   Sig: TAKE 1 TABLET BY MOUTH EVERY MORNING BEFORE BREAKFAST   Patient taking differently: Take 1 tablet by mouth once every morning before breakfast   potassium chloride ER (KLOR-CON M) 20 MEQ CR tablet   No No   Sig: Take 1 tablet (20 mEq) by mouth daily   silver sulfADIAZINE (SILVADENE) 1 % external cream   No No   Sig: Apply topically daily       Facility-Administered Medications: None     Allergies   Allergies   Allergen Reactions     No Known Allergies        Social History   I have reviewed this patient's social history and updated it with pertinent information if needed. Jayro Elder  reports that he quit smoking about 15 years ago. His smoking use included cigarettes. He started smoking about 41 years ago. He has a 25.00 pack-year smoking history. He has never used smokeless tobacco. He reports current alcohol use of about 4.0 standard drinks of alcohol per week. He reports that he does not use drugs.    Family History   I have reviewed this patient's family history and updated it with pertinent information if needed.   Family History   Problem Relation Age of Onset     Cancer - colorectal Sister      Thyroid Disease Brother      Cardiovascular Brother      Diabetes Brother      Cancer Father         tumor in chest and throat     Arthritis Mother      Thyroid Disease Mother      Diabetes Mother      Glaucoma No family hx of      Macular Degeneration No family hx of        -----------------------------------------------------------------------------------------------------------------------------------------------------------------------------------------------------    Review of Systems   The 10 point Review of Systems is negative other than noted in the HPI or here.     Physical Exam   Temp: 97.8  F (36.6  C) Temp src: Temporal BP: (!) 157/84 Pulse: 81   Resp: 18 SpO2: 97 % O2 Device: None (Room air)    Vital Signs with Ranges  Temp:  [97.8  F (36.6  C)] 97.8  F (36.6  C)  Pulse:  [81] 81  Resp:  [18] 18  BP: (140-157)/(84-96) 157/84  SpO2:  [97 %] 97 %  0 lbs 0 oz    Constitutional: Awake, alert, cooperative, no apparent distress.  Eyes: Conjunctiva and pupils examined and normal.  HEENT: Moist mucous membranes, normal dentition.  Respiratory: Clear to auscultation bilaterally, no crackles or wheezing.  Cardiovascular: Regular rate and rhythm,  normal S1 and S2, and no murmur noted.  GI: Soft, non-distended, non-tender, normal bowel sounds.  Lymph/Hematologic: No anterior cervical or supraclavicular adenopathy.  Skin: Left foot bandage  Musculoskeletal: No joint swelling, erythema or tenderness.  Neurologic: Cranial nerves 2-12 intact, normal strength and sensation.  Psychiatric: Alert, oriented to person, place and time, no obvious anxiety or depression.     Data     Recent Labs   Lab 05/03/21  1540   WBC 4.7   HGB 11.2*   MCV 88         POTASSIUM 3.5   CHLORIDE 102   CO2 31   BUN 20   CR 1.22   ANIONGAP 4   BETTIE 8.7   *       No results found for this or any previous visit (from the past 24 hour(s)).

## 2021-05-07 NOTE — PHARMACY-VANCOMYCIN DOSING SERVICE
Pharmacy Vancomycin Initial Note  Date of Service May 7, 2021  Patient's  1950  70 year old, male    Indication: Osteomyelitis    Current estimated CrCl = Estimated Creatinine Clearance: 79 mL/min (based on SCr of 1.22 mg/dL).    Creatinine for last 3 days  No results found for requested labs within last 72 hours.    Recent Vancomycin Level(s) for last 3 days  No results found for requested labs within last 72 hours.      Vancomycin IV Administrations (past 72 hours)      No vancomycin orders with administrations in past 72 hours.                Nephrotoxins and other renal medications (From now, onward)    Start     Dose/Rate Route Frequency Ordered Stop    21 1800  vancomycin (VANCOCIN) 2,000 mg in sodium chloride 0.9 % 500 mL intermittent infusion      2,000 mg  over 2 Hours Intravenous EVERY 12 HOURS 21 1727            Contrast Orders - past 72 hours (72h ago, onward)    None              Plan:  1. Start vancomycin  2000 mg IV q12h.   2. Vancomycin monitoring method: Trough (Method 1 = dosing nomogram)  3. Vancomycin therapeutic monitoring goal: 15-20 mg/L  4. Pharmacy will check vancomycin levels as appropriate in 1-3 Days.    5. Serum creatinine levels will be ordered daily for the first week of therapy and at least twice weekly for subsequent weeks.      Danilo Del Real RPH

## 2021-05-07 NOTE — PROGRESS NOTES
"Podiatry / Foot and Ankle Surgery Progress Note    May 7, 2021    Subject: Patient was seen for follow up on left foot ulcers and follow-up on MRI results.  He notes that he has had more pain to the toe.  Has been on oral antibiotics and continuing to get redness to the toe and foot.  Denies fever but does have some chills.  Pain is 5 out of 10.  Normally he has baseline numbness due to his neuropathy.    Objective:  Vitals: BP (!) 140/96   Ht 1.93 m (6' 4\")   Wt 117.5 kg (259 lb)   BMI 31.53 kg/m    BMI= Body mass index is 31.53 kg/m .    A1C: 8.1 (4/2021)     General:  Patient is alert and orientated.  NAD     Vascular:  DP and PT pulses are palpable.  No varicosities noted  CFT's < 3secs.  Skin temp is normal     Neuro:  Light and gross touch sensation absent to feet.      Derm: Thickened pre-ulcerative hyperkeratotic lesion to the plantar aspect of the left first metatarsal head.  Upon debridement there is a small partial-thickness ulcer with the fat layer exposed measuring 2.5cm x 3.0cm x 0.2cm.  No surrounding erythema purulent drainage or malodor noted..   Heavy clear serous drainage is noted with maceration around the ulcer.  No acute signs of infection noted.      Full thickness ulcer to distal left 3rd toe. Measures 0.5cm x 0.5cm x 0.4cm.  Probes to bone. Streaking redness up to dorsal foot.     Small pressure area noted to the lateral right fifth met head.  Nothing is open.  Stage I pressure area.     Preulcerative hyperkeratotic lesion to the plantar aspect of the right first metatarsal head and IPJ.  No open lesions on debridement.  Patient does have a new cut on his right fifth toe.  This is superficial.  No redness or signs of acute infection noted.     Musculoskeletal: multiple previous toe amputations right foot.  Left second toe amputated previously.    MRI left foot:  Corresponding to the external marker, subacute appearing fifth  proximal phalangeal base fracture and subchronic appearing " fourth  proximal phalangeal shaft/base fracture.  2. Edema-like marrow signal intensity of the third distal phalanx with  associated enhancement and mild T1 hypointensity without associated  MRI evidence of deep soft tissue defect. Differential consideration of  this finding includes osteomyelitis especially if there is clinical  evidence of overlying soft tissue defect.     Assessment:    Diabetic polyneuropathy associated with type 2 diabetes mellitus (H)  Ulcer of left foot, with necrosis of bone (H)  Osteomyelitis of third toe of left foot (H)  Hammertoes of both feet  H/O amputation of lesser toe, left (H)     Medical Decision Making/Plan: Reviewed and discussed the MRI results with patient.  Discussed that he does have osteomyelitis in the third toe.  Given that he has been on oral antibiotics and there is continued redness and now pain and chills I recommend admitting to the hospital for IV antibiotics and likely surgical excision of the infected bone.  This would require a partial to total left third toe amputation.  Patient agrees with the plan.     Procedure: After verbal consent, excisional debridement was performed on ulcer.  #15 blade was used to debride ulcer down to and including subcutaneous tissue. Bleeding controlled with light pressure.   No drainage noted.  No anesthesia was used due to neuropathy. Dry dressing applied to foot.  Patient tolerated procedure well.      Patient Risk Factor:  Patient is a  high risk factor for infection.     Татьяна Mckeon DPM, Podiatry/Foot and Ankle Surgery    Recommended to Jayro Elder to follow up with Primary Care provider regarding elevated blood pressure.

## 2021-05-07 NOTE — LETTER
"    5/7/2021         RE: Jayro Elder  [address] [place] [address]        Dear Colleague,    Thank you for referring your patient, Jayro Elder, to the [facility]. Please see a copy of my visit note below.    Podiatry / Foot and Ankle Surgery Progress Note    May 7, 2021    Subject: Patient was seen for follow up on left foot ulcers and follow-up on MRI results.  He notes that he has had more pain to the toe.  Has been on oral antibiotics and continuing to get redness to the toe and foot.  Denies fever but does have some chills.  Pain is 5 out of 10.  Normally he has baseline numbness due to his neuropathy.    Objective:  Vitals: BP (!) 140/96   Ht 1.93 m (6' 4\")   Wt 117.5 kg (259 lb)   BMI 31.53 kg/m    BMI= Body mass index is 31.53 kg/m .    A1C: 8.1 (4/2021)     General:  Patient is alert and orientated.  NAD     Vascular:  DP and PT pulses are palpable.  No varicosities noted  CFT's < 3secs.  Skin temp is normal     Neuro:  Light and gross touch sensation absent to feet.      Derm: Thickened pre-ulcerative hyperkeratotic lesion to the plantar aspect of the left first metatarsal head.  Upon debridement there is a small partial-thickness ulcer with the fat layer exposed measuring 2.5cm x 3.0cm x 0.2cm.  No surrounding erythema purulent drainage or malodor noted..   Heavy clear serous drainage is noted with maceration around the ulcer.  No acute signs of infection noted.      Full thickness ulcer to distal left 3rd toe. Measures 0.5cm x 0.5cm x 0.4cm.  Probes to bone. Streaking redness up to dorsal foot.     Small pressure area noted to the lateral right fifth met head.  Nothing is open.  Stage I pressure area.     Preulcerative hyperkeratotic lesion to the plantar aspect of the right first metatarsal head and IPJ.  No open lesions on debridement.  Patient does have a new cut on his right fifth toe.  This is superficial.  No redness or signs of acute " infection noted.     Musculoskeletal: multiple previous toe amputations right foot.  Left second toe amputated previously.    MRI left foot:  Corresponding to the external marker, subacute appearing fifth  proximal phalangeal base fracture and subchronic appearing fourth  proximal phalangeal shaft/base fracture.  2. Edema-like marrow signal intensity of the third distal phalanx with  associated enhancement and mild T1 hypointensity without associated  MRI evidence of deep soft tissue defect. Differential consideration of  this finding includes osteomyelitis especially if there is clinical  evidence of overlying soft tissue defect.     Assessment:    Diabetic polyneuropathy associated with type 2 diabetes mellitus (H)  Ulcer of left foot, with necrosis of bone (H)  Osteomyelitis of third toe of left foot (H)  Hammertoes of both feet  H/O amputation of lesser toe, left (H)     Medical Decision Making/Plan: Reviewed and discussed the MRI results with patient.  Discussed that he does have osteomyelitis in the third toe.  Given that he has been on oral antibiotics and there is continued redness and now pain and chills I recommend admitting to the hospital for IV antibiotics and likely surgical excision of the infected bone.  This would require a partial to total left third toe amputation.  Patient agrees with the plan.     Procedure: After verbal consent, excisional debridement was performed on ulcer.  #15 blade was used to debride ulcer down to and including subcutaneous tissue. Bleeding controlled with light pressure.   No drainage noted.  No anesthesia was used due to neuropathy. Dry dressing applied to foot.  Patient tolerated procedure well.      Patient Risk Factor:  Patient is a  high risk factor for infection.     Татьяна Mckeon DPM, Podiatry/Foot and Ankle Surgery    Recommended to Jayro Elder to follow up with Primary Care provider regarding elevated blood pressure.        Again, thank you for allowing me  to participate in the care of your patient.        Sincerely,        Татьяна Mckeon DPM, Podiatry/Foot and Ankle Surgery

## 2021-05-07 NOTE — TELEPHONE ENCOUNTER
3-Hospitalist Huddle Documentation    Acuity/Preferred Bed Type: medical floor  Infection Concerns: none  Additional Specialist Needed Or Specialist Already Contacted:   Podiatry aware but will need to be consulted.  Timely Treatments Needed: Yes: start IV Vanco and Zosyn, MRI done as outpatient.   Important things to know/address during hospitalization: sitter not needed    DM, L foot ulcer, on Keflex and Doxycycline, worsening infection. MRI shows osteomyelitis. IV Vanco/Zosyn  Poss amputation tomorrow    Isabelle Cortez PA-C

## 2021-05-08 ENCOUNTER — ANESTHESIA (OUTPATIENT)
Dept: SURGERY | Facility: CLINIC | Age: 71
DRG: 617 | End: 2021-05-08
Payer: COMMERCIAL

## 2021-05-08 ENCOUNTER — APPOINTMENT (OUTPATIENT)
Dept: GENERAL RADIOLOGY | Facility: CLINIC | Age: 71
DRG: 617 | End: 2021-05-08
Attending: PODIATRIST
Payer: COMMERCIAL

## 2021-05-08 ENCOUNTER — ANESTHESIA EVENT (OUTPATIENT)
Dept: SURGERY | Facility: CLINIC | Age: 71
DRG: 617 | End: 2021-05-08
Payer: COMMERCIAL

## 2021-05-08 LAB
ANION GAP SERPL CALCULATED.3IONS-SCNC: 4 MMOL/L (ref 3–14)
BUN SERPL-MCNC: 21 MG/DL (ref 7–30)
CALCIUM SERPL-MCNC: 8.6 MG/DL (ref 8.5–10.1)
CHLORIDE SERPL-SCNC: 105 MMOL/L (ref 94–109)
CO2 SERPL-SCNC: 29 MMOL/L (ref 20–32)
CREAT SERPL-MCNC: 1.09 MG/DL (ref 0.66–1.25)
ERYTHROCYTE [DISTWIDTH] IN BLOOD BY AUTOMATED COUNT: 13.1 % (ref 10–15)
GFR SERPL CREATININE-BSD FRML MDRD: 68 ML/MIN/{1.73_M2}
GLUCOSE BLDC GLUCOMTR-MCNC: 112 MG/DL (ref 70–99)
GLUCOSE BLDC GLUCOMTR-MCNC: 132 MG/DL (ref 70–99)
GLUCOSE BLDC GLUCOMTR-MCNC: 150 MG/DL (ref 70–99)
GLUCOSE BLDC GLUCOMTR-MCNC: 157 MG/DL (ref 70–99)
GLUCOSE BLDC GLUCOMTR-MCNC: 158 MG/DL (ref 70–99)
GLUCOSE BLDC GLUCOMTR-MCNC: 164 MG/DL (ref 70–99)
GLUCOSE BLDC GLUCOMTR-MCNC: 167 MG/DL (ref 70–99)
GLUCOSE BLDC GLUCOMTR-MCNC: 168 MG/DL (ref 70–99)
GLUCOSE SERPL-MCNC: 156 MG/DL (ref 70–99)
GRAM STN SPEC: NORMAL
GRAM STN SPEC: NORMAL
HCT VFR BLD AUTO: 33.7 % (ref 40–53)
HGB BLD-MCNC: 10.9 G/DL (ref 13.3–17.7)
INR PPP: 1.23 (ref 0.86–1.14)
MCH RBC QN AUTO: 28.4 PG (ref 26.5–33)
MCHC RBC AUTO-ENTMCNC: 32.3 G/DL (ref 31.5–36.5)
MCV RBC AUTO: 88 FL (ref 78–100)
PLATELET # BLD AUTO: 209 10E9/L (ref 150–450)
POTASSIUM SERPL-SCNC: 4 MMOL/L (ref 3.4–5.3)
RBC # BLD AUTO: 3.84 10E12/L (ref 4.4–5.9)
SODIUM SERPL-SCNC: 138 MMOL/L (ref 133–144)
SPECIMEN SOURCE: NORMAL
WBC # BLD AUTO: 6.7 10E9/L (ref 4–11)

## 2021-05-08 PROCEDURE — 250N000012 HC RX MED GY IP 250 OP 636 PS 637: Performed by: PODIATRIST

## 2021-05-08 PROCEDURE — 250N000013 HC RX MED GY IP 250 OP 250 PS 637: Performed by: PODIATRIST

## 2021-05-08 PROCEDURE — 87076 CULTURE ANAEROBE IDENT EACH: CPT | Performed by: PODIATRIST

## 2021-05-08 PROCEDURE — 87070 CULTURE OTHR SPECIMN AEROBIC: CPT | Performed by: PODIATRIST

## 2021-05-08 PROCEDURE — 85027 COMPLETE CBC AUTOMATED: CPT | Performed by: INTERNAL MEDICINE

## 2021-05-08 PROCEDURE — L3260 AMBULATORY SURGICAL BOOT EAC: HCPCS

## 2021-05-08 PROCEDURE — 87077 CULTURE AEROBIC IDENTIFY: CPT | Performed by: PODIATRIST

## 2021-05-08 PROCEDURE — 258N000003 HC RX IP 258 OP 636: Performed by: ANESTHESIOLOGY

## 2021-05-08 PROCEDURE — 80048 BASIC METABOLIC PNL TOTAL CA: CPT | Performed by: INTERNAL MEDICINE

## 2021-05-08 PROCEDURE — 250N000011 HC RX IP 250 OP 636: Performed by: INTERNAL MEDICINE

## 2021-05-08 PROCEDURE — 250N000011 HC RX IP 250 OP 636: Performed by: ANESTHESIOLOGY

## 2021-05-08 PROCEDURE — 370N000017 HC ANESTHESIA TECHNICAL FEE, PER MIN: Performed by: PODIATRIST

## 2021-05-08 PROCEDURE — 36415 COLL VENOUS BLD VENIPUNCTURE: CPT | Performed by: INTERNAL MEDICINE

## 2021-05-08 PROCEDURE — 87205 SMEAR GRAM STAIN: CPT | Performed by: PODIATRIST

## 2021-05-08 PROCEDURE — 87185 SC STD ENZYME DETCJ PER NZM: CPT | Performed by: PODIATRIST

## 2021-05-08 PROCEDURE — 250N000013 HC RX MED GY IP 250 OP 250 PS 637: Performed by: INTERNAL MEDICINE

## 2021-05-08 PROCEDURE — 250N000011 HC RX IP 250 OP 636: Performed by: NURSE ANESTHETIST, CERTIFIED REGISTERED

## 2021-05-08 PROCEDURE — 120N000001 HC R&B MED SURG/OB

## 2021-05-08 PROCEDURE — 258N000003 HC RX IP 258 OP 636: Performed by: INTERNAL MEDICINE

## 2021-05-08 PROCEDURE — 250N000011 HC RX IP 250 OP 636: Performed by: PODIATRIST

## 2021-05-08 PROCEDURE — 360N000075 HC SURGERY LEVEL 2, PER MIN: Performed by: PODIATRIST

## 2021-05-08 PROCEDURE — 999N001017 HC STATISTIC GLUCOSE BY METER IP

## 2021-05-08 PROCEDURE — 87075 CULTR BACTERIA EXCEPT BLOOD: CPT | Performed by: PODIATRIST

## 2021-05-08 PROCEDURE — 88305 TISSUE EXAM BY PATHOLOGIST: CPT | Mod: TC | Performed by: PODIATRIST

## 2021-05-08 PROCEDURE — 28825 PARTIAL AMPUTATION OF TOE: CPT | Mod: T2 | Performed by: PODIATRIST

## 2021-05-08 PROCEDURE — 250N000012 HC RX MED GY IP 250 OP 636 PS 637: Performed by: INTERNAL MEDICINE

## 2021-05-08 PROCEDURE — 97597 DBRDMT OPN WND 1ST 20 CM/<: CPT | Mod: 59 | Performed by: PODIATRIST

## 2021-05-08 PROCEDURE — 88311 DECALCIFY TISSUE: CPT | Mod: 26 | Performed by: PATHOLOGY

## 2021-05-08 PROCEDURE — 88311 DECALCIFY TISSUE: CPT | Mod: TC | Performed by: PODIATRIST

## 2021-05-08 PROCEDURE — 250N000009 HC RX 250: Performed by: PODIATRIST

## 2021-05-08 PROCEDURE — 250N000009 HC RX 250: Performed by: NURSE ANESTHETIST, CERTIFIED REGISTERED

## 2021-05-08 PROCEDURE — 710N000009 HC RECOVERY PHASE 1, LEVEL 1, PER MIN: Performed by: PODIATRIST

## 2021-05-08 PROCEDURE — 999N000065 XR FOOT PORT LEFT 3 VIEWS: Mod: LT

## 2021-05-08 PROCEDURE — 99233 SBSQ HOSP IP/OBS HIGH 50: CPT | Performed by: INTERNAL MEDICINE

## 2021-05-08 PROCEDURE — 85610 PROTHROMBIN TIME: CPT | Performed by: INTERNAL MEDICINE

## 2021-05-08 PROCEDURE — 87186 SC STD MICRODIL/AGAR DIL: CPT | Performed by: PODIATRIST

## 2021-05-08 PROCEDURE — 88305 TISSUE EXAM BY PATHOLOGIST: CPT | Mod: 26 | Performed by: PATHOLOGY

## 2021-05-08 PROCEDURE — 272N000001 HC OR GENERAL SUPPLY STERILE: Performed by: PODIATRIST

## 2021-05-08 PROCEDURE — 999N000141 HC STATISTIC PRE-PROCEDURE NURSING ASSESSMENT: Performed by: PODIATRIST

## 2021-05-08 PROCEDURE — 258N000003 HC RX IP 258 OP 636: Performed by: PODIATRIST

## 2021-05-08 RX ORDER — HYDROMORPHONE HYDROCHLORIDE 1 MG/ML
.3-.5 INJECTION, SOLUTION INTRAMUSCULAR; INTRAVENOUS; SUBCUTANEOUS EVERY 10 MIN PRN
Status: DISCONTINUED | OUTPATIENT
Start: 2021-05-08 | End: 2021-05-08 | Stop reason: HOSPADM

## 2021-05-08 RX ORDER — LIDOCAINE 40 MG/G
CREAM TOPICAL
Status: DISCONTINUED | OUTPATIENT
Start: 2021-05-08 | End: 2021-05-08 | Stop reason: HOSPADM

## 2021-05-08 RX ORDER — HYDRALAZINE HYDROCHLORIDE 20 MG/ML
2.5-5 INJECTION INTRAMUSCULAR; INTRAVENOUS EVERY 10 MIN PRN
Status: DISCONTINUED | OUTPATIENT
Start: 2021-05-08 | End: 2021-05-08 | Stop reason: HOSPADM

## 2021-05-08 RX ORDER — METOPROLOL TARTRATE 1 MG/ML
1-2 INJECTION, SOLUTION INTRAVENOUS EVERY 5 MIN PRN
Status: DISCONTINUED | OUTPATIENT
Start: 2021-05-08 | End: 2021-05-08 | Stop reason: HOSPADM

## 2021-05-08 RX ORDER — ONDANSETRON 2 MG/ML
4 INJECTION INTRAMUSCULAR; INTRAVENOUS EVERY 30 MIN PRN
Status: DISCONTINUED | OUTPATIENT
Start: 2021-05-08 | End: 2021-05-08 | Stop reason: HOSPADM

## 2021-05-08 RX ORDER — PROPOFOL 10 MG/ML
INJECTION, EMULSION INTRAVENOUS CONTINUOUS PRN
Status: DISCONTINUED | OUTPATIENT
Start: 2021-05-08 | End: 2021-05-08

## 2021-05-08 RX ORDER — LIDOCAINE HYDROCHLORIDE 10 MG/ML
INJECTION, SOLUTION INFILTRATION; PERINEURAL PRN
Status: DISCONTINUED | OUTPATIENT
Start: 2021-05-08 | End: 2021-05-08

## 2021-05-08 RX ORDER — FENTANYL CITRATE 50 UG/ML
25-50 INJECTION, SOLUTION INTRAMUSCULAR; INTRAVENOUS
Status: DISCONTINUED | OUTPATIENT
Start: 2021-05-08 | End: 2021-05-08 | Stop reason: HOSPADM

## 2021-05-08 RX ORDER — CEFAZOLIN SODIUM 2 G/100ML
2 INJECTION, SOLUTION INTRAVENOUS
Status: DISCONTINUED | OUTPATIENT
Start: 2021-05-08 | End: 2021-05-08 | Stop reason: HOSPADM

## 2021-05-08 RX ORDER — CEFAZOLIN SODIUM 2 G/100ML
2 INJECTION, SOLUTION INTRAVENOUS SEE ADMIN INSTRUCTIONS
Status: DISCONTINUED | OUTPATIENT
Start: 2021-05-08 | End: 2021-05-08 | Stop reason: HOSPADM

## 2021-05-08 RX ORDER — SODIUM CHLORIDE, SODIUM LACTATE, POTASSIUM CHLORIDE, CALCIUM CHLORIDE 600; 310; 30; 20 MG/100ML; MG/100ML; MG/100ML; MG/100ML
INJECTION, SOLUTION INTRAVENOUS CONTINUOUS
Status: DISCONTINUED | OUTPATIENT
Start: 2021-05-08 | End: 2021-05-08 | Stop reason: HOSPADM

## 2021-05-08 RX ORDER — OXYCODONE HYDROCHLORIDE 5 MG/1
5 TABLET ORAL ONCE
Status: COMPLETED | OUTPATIENT
Start: 2021-05-08 | End: 2021-05-08

## 2021-05-08 RX ORDER — ONDANSETRON 2 MG/ML
INJECTION INTRAMUSCULAR; INTRAVENOUS PRN
Status: DISCONTINUED | OUTPATIENT
Start: 2021-05-08 | End: 2021-05-08

## 2021-05-08 RX ORDER — BUPIVACAINE HYDROCHLORIDE 5 MG/ML
INJECTION, SOLUTION EPIDURAL; INTRACAUDAL PRN
Status: DISCONTINUED | OUTPATIENT
Start: 2021-05-08 | End: 2021-05-08 | Stop reason: HOSPADM

## 2021-05-08 RX ORDER — FENTANYL CITRATE 50 UG/ML
INJECTION, SOLUTION INTRAMUSCULAR; INTRAVENOUS PRN
Status: DISCONTINUED | OUTPATIENT
Start: 2021-05-08 | End: 2021-05-08

## 2021-05-08 RX ORDER — ONDANSETRON 4 MG/1
4 TABLET, ORALLY DISINTEGRATING ORAL EVERY 30 MIN PRN
Status: DISCONTINUED | OUTPATIENT
Start: 2021-05-08 | End: 2021-05-08 | Stop reason: HOSPADM

## 2021-05-08 RX ORDER — METOCLOPRAMIDE HYDROCHLORIDE 5 MG/ML
10 INJECTION INTRAMUSCULAR; INTRAVENOUS EVERY 6 HOURS PRN
Status: DISCONTINUED | OUTPATIENT
Start: 2021-05-08 | End: 2021-05-08 | Stop reason: HOSPADM

## 2021-05-08 RX ORDER — PROPOFOL 10 MG/ML
INJECTION, EMULSION INTRAVENOUS PRN
Status: DISCONTINUED | OUTPATIENT
Start: 2021-05-08 | End: 2021-05-08

## 2021-05-08 RX ORDER — METOCLOPRAMIDE 10 MG/1
10 TABLET ORAL EVERY 6 HOURS PRN
Status: DISCONTINUED | OUTPATIENT
Start: 2021-05-08 | End: 2021-05-08 | Stop reason: HOSPADM

## 2021-05-08 RX ORDER — MEPERIDINE HYDROCHLORIDE 25 MG/ML
12.5 INJECTION INTRAMUSCULAR; INTRAVENOUS; SUBCUTANEOUS
Status: DISCONTINUED | OUTPATIENT
Start: 2021-05-08 | End: 2021-05-08 | Stop reason: HOSPADM

## 2021-05-08 RX ORDER — AMPICILLIN AND SULBACTAM 2; 1 G/1; G/1
3 INJECTION, POWDER, FOR SOLUTION INTRAMUSCULAR; INTRAVENOUS EVERY 6 HOURS
Status: DISCONTINUED | OUTPATIENT
Start: 2021-05-08 | End: 2021-05-10

## 2021-05-08 RX ORDER — DIMENHYDRINATE 50 MG/ML
25 INJECTION, SOLUTION INTRAMUSCULAR; INTRAVENOUS
Status: DISCONTINUED | OUTPATIENT
Start: 2021-05-08 | End: 2021-05-08 | Stop reason: HOSPADM

## 2021-05-08 RX ADMIN — VANCOMYCIN HYDROCHLORIDE 2000 MG: 10 INJECTION, POWDER, LYOPHILIZED, FOR SOLUTION INTRAVENOUS at 06:50

## 2021-05-08 RX ADMIN — LIDOCAINE HYDROCHLORIDE 40 MG: 10 INJECTION, SOLUTION INFILTRATION; PERINEURAL at 10:56

## 2021-05-08 RX ADMIN — INSULIN GLARGINE 22 UNITS: 100 INJECTION, SOLUTION SUBCUTANEOUS at 08:07

## 2021-05-08 RX ADMIN — ATORVASTATIN CALCIUM 40 MG: 40 TABLET, FILM COATED ORAL at 02:08

## 2021-05-08 RX ADMIN — OXYCODONE HYDROCHLORIDE 5 MG: 5 TABLET ORAL at 12:37

## 2021-05-08 RX ADMIN — FENTANYL CITRATE 50 MCG: 50 INJECTION, SOLUTION INTRAMUSCULAR; INTRAVENOUS at 10:55

## 2021-05-08 RX ADMIN — OXYCODONE HYDROCHLORIDE 10 MG: 5 TABLET ORAL at 02:08

## 2021-05-08 RX ADMIN — LISINOPRIL 5 MG: 5 TABLET ORAL at 14:43

## 2021-05-08 RX ADMIN — FENTANYL CITRATE 50 MCG: 50 INJECTION, SOLUTION INTRAMUSCULAR; INTRAVENOUS at 12:26

## 2021-05-08 RX ADMIN — FENTANYL CITRATE 50 MCG: 50 INJECTION, SOLUTION INTRAMUSCULAR; INTRAVENOUS at 12:36

## 2021-05-08 RX ADMIN — FUROSEMIDE 40 MG: 40 TABLET ORAL at 14:44

## 2021-05-08 RX ADMIN — FERROUS SULFATE TAB 325 MG (65 MG ELEMENTAL FE) 325 MG: 325 (65 FE) TAB at 14:43

## 2021-05-08 RX ADMIN — ISOSORBIDE MONONITRATE 30 MG: 30 TABLET, EXTENDED RELEASE ORAL at 14:44

## 2021-05-08 RX ADMIN — PANTOPRAZOLE SODIUM 40 MG: 40 TABLET, DELAYED RELEASE ORAL at 14:44

## 2021-05-08 RX ADMIN — ONDANSETRON 4 MG: 4 TABLET, ORALLY DISINTEGRATING ORAL at 13:26

## 2021-05-08 RX ADMIN — CARVEDILOL 25 MG: 25 TABLET, FILM COATED ORAL at 16:44

## 2021-05-08 RX ADMIN — ATORVASTATIN CALCIUM 40 MG: 40 TABLET, FILM COATED ORAL at 20:40

## 2021-05-08 RX ADMIN — CARVEDILOL 25 MG: 25 TABLET, FILM COATED ORAL at 08:01

## 2021-05-08 RX ADMIN — OXYCODONE HYDROCHLORIDE 10 MG: 5 TABLET ORAL at 18:31

## 2021-05-08 RX ADMIN — ONDANSETRON 4 MG: 2 INJECTION INTRAMUSCULAR; INTRAVENOUS at 06:49

## 2021-05-08 RX ADMIN — Medication 1 MG: at 02:08

## 2021-05-08 RX ADMIN — ONDANSETRON HYDROCHLORIDE 4 MG: 2 INJECTION, SOLUTION INTRAVENOUS at 11:13

## 2021-05-08 RX ADMIN — SODIUM CHLORIDE, POTASSIUM CHLORIDE, SODIUM LACTATE AND CALCIUM CHLORIDE: 600; 310; 30; 20 INJECTION, SOLUTION INTRAVENOUS at 10:51

## 2021-05-08 RX ADMIN — PROPOFOL 45 MCG/KG/MIN: 10 INJECTION, EMULSION INTRAVENOUS at 11:00

## 2021-05-08 RX ADMIN — POTASSIUM CHLORIDE 20 MEQ: 1500 TABLET, EXTENDED RELEASE ORAL at 14:43

## 2021-05-08 RX ADMIN — INSULIN ASPART 1 UNITS: 100 INJECTION, SOLUTION INTRAVENOUS; SUBCUTANEOUS at 18:26

## 2021-05-08 RX ADMIN — PROPOFOL 25 MG: 10 INJECTION, EMULSION INTRAVENOUS at 10:55

## 2021-05-08 RX ADMIN — DEXTROSE AND SODIUM CHLORIDE: 5; 900 INJECTION, SOLUTION INTRAVENOUS at 05:04

## 2021-05-08 RX ADMIN — LEVOTHYROXINE SODIUM 137 MCG: 137 TABLET ORAL at 14:44

## 2021-05-08 RX ADMIN — CEFAZOLIN SODIUM 2 G: 2 INJECTION, SOLUTION INTRAVENOUS at 10:51

## 2021-05-08 RX ADMIN — VANCOMYCIN HYDROCHLORIDE 2000 MG: 10 INJECTION, POWDER, LYOPHILIZED, FOR SOLUTION INTRAVENOUS at 18:28

## 2021-05-08 RX ADMIN — AMPICILLIN SODIUM AND SULBACTAM SODIUM 3 G: 2; 1 INJECTION, POWDER, FOR SOLUTION INTRAMUSCULAR; INTRAVENOUS at 16:42

## 2021-05-08 RX ADMIN — AMPICILLIN SODIUM AND SULBACTAM SODIUM 3 G: 2; 1 INJECTION, POWDER, FOR SOLUTION INTRAMUSCULAR; INTRAVENOUS at 22:32

## 2021-05-08 ASSESSMENT — ACTIVITIES OF DAILY LIVING (ADL)
ADLS_ACUITY_SCORE: 15

## 2021-05-08 NOTE — PROGRESS NOTES
[facility]    Medicine Progress Note - Hospitalist Service       Date of Admission:  5/7/2021  Assessment & Plan       Jayro Elder is a 70 year old male with past medical history of multiple bilateral foot wounds status post multiple amputations and debridements, uncontrolled diabetes mellitus type 2 with insulin dependence, heart failure with reduced ejection fraction with EF of 40%, chronic kidney disease stage IIIa, paroxysmal atrial fibrillation on Eliquis, coronary artery disease, hypertension, hyperlipidemia, hypothyroidism, GERD, and iron deficiency anemia.  He presented to emergency room on 5/7 with left foot wound.  Found to have left foot osteomyelitis.  Started on antibiotics.  Seen in consultation by podiatry.  Underwent  left third toe amputation and excisional debridement of left foot on 5/8.    1.  Left foot osteomyelitis.  Has been seen in consultation by podiatry.  -Status post left third toe amputation and excisional debridement on 5/8.  -Continue IV vancomycin.  -Add IV Unasyn until culture results return.    2.  Diabetes mellitus.  Initially started on IV fluids with dextrose while n.p.o. overnight.  -Stop IV fluids with dextrose.  -Continue Lantus and scheduled NovoLog.  -NovoLog sliding scale.    3.  Chronic heart failure with reduced ejection fraction.  Does not appear to have acute exacerbation at this time.  -Stop IV fluids.  -Continue oral furosemide.  -Continue carvedilol.  -Hold lisinopril initially.    4.  Hypertension.  Continue carvedilol, furosemide, and isosorbide mononitrate.   -Hold lisinopril.    5.  GERD.  Continue pantoprazole.    6.  Hypothyroidism.  Continue levothyroxine.    7.  Hyperlipidemia.  Continue atorvastatin.    8.  Coronary artery disease.    -Continue atorvastatin and carvedilol.  -Restart clopidogrel when okay with podiatry.    9.  Chronic kidney disease stage IIIa.  Creatinine currently slightly better than baseline.  -Avoid  nephrotoxins as able.    10.  Paroxysmal atrial fibrillation.  -Continue carvedilol.  -Restart apixaban when okay with podiatry.     Diet: Moderate Consistent CHO Diet    DVT Prophylaxis: Pneumatic Compression Devices  Taylor Catheter: not present  Code Status: Full Code           Disposition Plan   Expected discharge: 2 - 3 days, recommended to prior living arrangement     Xiang Baumann, DO  Hospitalist Service  [facility]  Contact information available via [facility] Paging/Directory    ______________________________________________________________________    Interval History   Having some left foot pain.  Did feel nauseated after surgery.  Denies chest pain, shortness of breath, fevers, chills.    Data reviewed today: I reviewed all medications, new labs and imaging results over the last 24 hours.     Physical Exam   Vital Signs: Temp: 96.8  F (36  C) Temp src: Temporal BP: (!) 163/92 Pulse: 63   Resp: 16 SpO2: 100 % O2 Device: None (Room air) Oxygen Delivery: 2 LPM  Weight: 0 lbs 0 oz  Gen:  NAD, A&Ox3.  Eyes:  PERRL, sclera anicteric.  OP:  MMM, no lesions.  Neck:  Supple.  CV:  Regular, no loud murmurs.  Lung:  CTA b/l, normal effort.  Ab:  +BS, soft.  Skin:  Warm, dry to touch.  No rash.  Left foot dressing not removed.  Ext:  No pitting edema LE b/l.      Data   Recent Labs   Lab 05/08/21  0730 05/07/21  1841 05/03/21  1540   WBC 6.7 6.2 4.7   HGB 10.9* 11.7* 11.2*   MCV 88 87 88    225 202   INR 1.23*  --   --     135 137   POTASSIUM 4.0 3.9 3.5   CHLORIDE 105 102 102   CO2 29 31 31   BUN 21 21 20   CR 1.09 1.12 1.22   ANIONGAP 4 2* 4   BETTIE 8.6 8.7 8.7   * 249* 166*

## 2021-05-08 NOTE — PROGRESS NOTES
S:  I was on call in [place] and noticed an order for a DH2 shoe for patient in room 608-01.  DX:  post partial L 3rd toe amputation and excisional debridment of ulcer sub L first metatarsal head.  The patient mentioned he has a DH2 shoe and he believes it has caused an ulcer on the lateral side of his foot by the 5th toe.    O:  The patient has a significant amount of dressing on the L foot where the shoe is able to close but will probably be too tight until less dressing can be used.  The unwrapped the dressing to were I could see the wound and marked the shoe to remove the offloading pegs.  I removed the forefoot piece to change it from being a closed toe shoe to an open toe shoe.    A:  The shoes fits adequate and will offload the wound/ulcer to promote healing.  The shoe should not cause additional problems to the 5th toe with the front being removed.    P:  The patient will wear the shoe when weight bearing.   I have advised the patient if he notices any problems or feels the shoe might be causing his foot/skin harm to stop using it and contact us using the paperwork I have provided him so we can adjust or modify the shoe.  G:  Offload wound on foot.  Haresh ANTOINE

## 2021-05-08 NOTE — PROVIDER NOTIFICATION
Dr. Baumann paged: pt is scheduled to have 15 units of novolog with dinner- he is worried he will bottom out- he said he would only take 4-5 units     Telephone orders from Dr. Baumann, hold scheduled insulin with meals but leave sliding scale insulin and give 4 units x 1 with dinner tonight plus sliding scale insulin. Will address the scheduled insulin in am

## 2021-05-08 NOTE — PHARMACY-ADMISSION MEDICATION HISTORY
Admission medication history interview status for this patient is complete. See [facility] admission navigator for allergy information, prior to admission medications and immunization status.     Medication history interview done, indicate source(s): Patient  Medication history resources (including written lists, pill bottles, clinic record): SureScripts and Care Everywhere  Pharmacy: [facility] [address] [place]. [facility] for discharge    Changes made to PTA medication list:  Added: none  Deleted: Iodine gel  Changed: none    Actions taken by pharmacist (provider contacted, etc): Spoke with patient about home med list     Additional medication history information:None    Medication reconciliation/reorder completed by provider prior to medication history?  N   (Y/N)     For patients on insulin therapy:   Do you use sliding scale insulin based on blood sugars? Yes  What is your pre-meal insulin coverage?  12 units for breakfast and lunch, 14 units for supper  Do you typically eat three meals a day? Yes  How many times do you check your blood glucose per day? 12  How many episodes of hypoglycemia do you typically have per month? 1-2  Do you have a Continuous Glucose Monitor (CGM)?  Yes    Prior to Admission medications    Medication Sig Last Dose Taking? Auth Provider   apixaban ANTICOAGULANT (ELIQUIS) 5 MG tablet Take 1 tablet (5 mg) by mouth 2 times daily 5/7/2021 at am Yes Gill Doty PA   atorvastatin (LIPITOR) 40 MG tablet Take 1 tablet (40 mg) by mouth every evening 5/6/2021 at pm Yes Gill Doty PA   carvedilol (COREG) 25 MG tablet Take 1 tablet (25 mg) by mouth 2 times daily (with meals) 5/7/2021 at am Yes Justin Tomlin MD   cephALEXin (KEFLEX) 500 MG capsule Take 1 capsule (500 mg) by mouth 4 times daily for 7 days 5/7/2021 at 1200 Yes Caden Humphreys PA-C   clopidogrel (PLAVIX) 75 MG tablet Take 1 tablet (75 mg) by mouth daily 5/7/2021 at am Yes Justin Tomlin MD   doxycycline hyclate  (VIBRAMYCIN) 100 MG capsule Take 1 capsule (100 mg) by mouth 2 times daily for 7 days 5/7/2021 at am Yes Caden Humphreys PA-C   furosemide (LASIX) 40 MG tablet Take 80mg (2 tabs) every morning, and 40mg (1 tab) every afternoon. 5/7/2021 at am Yes Davion Perez PA-C   gabapentin (NEURONTIN) 300 MG capsule Take 1-3 capsules by mouth once nightly at bedtime 5/6/2021 at pm Yes Unknown, Entered By History   HYDROcodone-acetaminophen (NORCO) 5-325 MG tablet Take 1 tablet by mouth every 6 hours as needed for moderate to severe pain 5/5/2021 at pm Yes Caden Humphreys PA-C   insulin aspart (NOVOLOG FLEXPEN) 100 UNIT/ML pen Inject Subcutaneous 3 times daily (with meals) Sliding Scale  Do not give sliding scale insulin if Pre-Meal BG less than 140.   For Pre-Meal:   - 200 give 2 units.    - 250 give 4 units.    - 300 give 6 units.    - 350 give 8 units.    - 400 give 10 units. 5/7/2021 at am Yes Unknown, Entered By History   insulin aspart (NOVOLOG PEN) 100 UNIT/ML pen Inject 12 Units Subcutaneous 2 times daily (with meals) With breakfast and lunch 5/7/2021 at am Yes Mel Perez PA-C   insulin aspart (NOVOLOG PEN) 100 UNIT/ML pen Inject 14 Units Subcutaneous daily (with dinner) 5/6/2021 at pm Yes Mel Perez PA-C   insulin glargine (LANTUS PEN) 100 UNIT/ML pen Inject 44 units subcutaneously once every morning 5/7/2021 at am Yes Unknown, Entered By History   isosorbide mononitrate (IMDUR) 30 MG 24 hr tablet Take 1 tablet (30 mg) by mouth daily 5/7/2021 at am Yes Nickie Lopez APRN CNP   levothyroxine (SYNTHROID, LEVOTHROID) 137 MCG tablet Take 1 tablet by mouth once every evening 5/7/2021 at am Yes Henrry Figueroa PA-C   lisinopril (ZESTRIL) 5 MG tablet Take 1 tablet (5 mg) by mouth daily 5/7/2021 at am Yes Davion Perez PA-C   pantoprazole (PROTONIX) 40 MG EC tablet TAKE 1 TABLET BY MOUTH EVERY MORNING BEFORE BREAKFAST 5/7/2021  at am Yes Davion Cordova PA-C   potassium chloride ER (KLOR-CON M) 20 MEQ CR tablet Take 1 tablet (20 mEq) by mouth daily 5/7/2021 at am Yes Davion Perez PA-C   silver sulfADIAZINE (SILVADENE) 1 % external cream Apply topically daily Past Week at Unknown time Yes Татьяна Mckeon, DPM, Podiatry/Foot and Ankle Surgery   Continuous Blood Gluc  (FREESTYLE CLARITA 2 READER SYSTM) DRAGAN 1 Device daily   Mel Perez PA-C   Continuous Blood Gluc Sensor (FREESTYLE CLARITA 2 SENSOR SYSTM) MISC 1 Units 4 times daily (before meals and nightly)   Mel Perez PA-C   ferrous sulfate (FEROSUL) 325 (65 Fe) MG tablet Take 1 tablet (325 mg) by mouth daily (with breakfast) More than a month at Unknown time  Davion Perez PA-C   insulin pen needle (31G X 6 MM) 31G X 6 MM miscellaneous Use  as directed.   Sarah Bolivar MD   nitroGLYcerin (NITROSTAT) 0.4 MG sublingual tablet For chest pain place 1 tablet under the tongue every 5 minutes for 3 doses. If symptoms persist 5 minutes after 1st dose call 911.   Davion Perez PA-C   Vitamin D, Cholecalciferol, 25 MCG (1000 UT) TABS Take 1 tablet by mouth once daily More than a month at Unknown time  Unknown, Entered By History

## 2021-05-08 NOTE — ANESTHESIA POSTPROCEDURE EVALUATION
Patient: Jayro MESSINA Boldenow    Procedure(s):  AMPUTATION, LEFT THIRD TOE AND EXCISIONAL DEBRIDEMENT OF THE LEFT FOOT    Diagnosis:Diabetic polyneuropathy associated with type 2 diabetes mellitus (H) [E11.42]  Ulcer of left foot, with necrosis of bone (H) [L97.524]  Osteomyelitis of third toe of left foot (H) [M86.9]  Hammertoes of both feet [M20.41, M20.42]  H/O amputation of lesser toe, left (H) [Z89.422]  Diagnosis Additional Information: No value filed.    Anesthesia Type:  MAC    Note:     Postop Pain Control: Uneventful            Sign Out: Well controlled pain   PONV: No   Neuro/Psych: Uneventful            Sign Out: Acceptable/Baseline neuro status   Airway/Respiratory: Uneventful            Sign Out: Acceptable/Baseline resp. status   CV/Hemodynamics: Uneventful            Sign Out: Acceptable CV status; No obvious hypovolemia; No obvious fluid overload   Other NRE: NONE   DID A NON-ROUTINE EVENT OCCUR? No           Last vitals:  Vitals:    05/08/21 1313 05/08/21 1329 05/08/21 1436   BP: (!) 160/89 (!) 153/73 (!) 163/92   Pulse: 68 68 63   Resp: 16 16 16   Temp: 96.8  F (36  C)     SpO2: 98% 98% 100%       Last vitals prior to Anesthesia Care Transfer:  CRNA VITALS  5/8/2021 1103 - 5/8/2021 1203      5/8/2021             Pulse:  65    SpO2:  99 %          Electronically Signed By: Vinicio Rosas MD  May 8, 2021  3:27 PM

## 2021-05-08 NOTE — BRIEF OP NOTE
[facility]    Brief Operative Note    Pre-operative diagnosis: Diabetic polyneuropathy associated with type 2 diabetes mellitus (H) [E11.42]  Ulcer of left foot, with necrosis of bone (H) [L97.524]  Osteomyelitis of third toe of left foot (H) [M86.9]  Hammertoes of both feet [M20.41, M20.42]  H/O amputation of lesser toe, left (H) [Z89.422]  Post-operative diagnosis Same as pre-operative diagnosis    Procedure: Procedure(s):  AMPUTATION, LEFT THIRD TOE AND EXCISIONAL DEBRIDEMENT OF THE LEFT FOOT  Surgeon: Surgeon(s) and Role:     * Kwasi Root DPM - Primary  Anesthesia: Combined MAC with Topical   Estimated blood loss: 2 ml  Drains: None  Specimens:   ID Type Source Tests Collected by Time Destination   1 : LEFT THIRD TOE BONE CULTURE Bone Toe ANAEROBIC BACTERIAL CULTURE, GRAM STAIN, TISSUE CULTURE AEROBIC BACTERIAL Kwasi Root DPM 5/8/2021 11:14 AM    A : LEFT THIRD TOE  Tissue Toe SURGICAL PATHOLOGY EXAM Kwasi Root DPM 5/8/2021 11:23 AM      Findings:  All tissues at the level of left 3rd toe amputation appeared grossly viable and free of infection. The ulcer sub first metatarsal head was limited to deeper skin in depth, without clinical signs of infection.   Complications: None.  Implants: * No implants in log *     Plan:  Continue IV abx  Bone cultures pending, yet all bone infection is considered removed.   Will check surgical site tomorrow.  Consider PO Augmentin at discharge  Dispo: home tomorrow afternoon or Monday?    Kwasi Root DPM, FACSHAWN, MS  [facility] Department of Podiatry/Foot & Ankle Surgery  [phone]

## 2021-05-08 NOTE — ANESTHESIA PREPROCEDURE EVALUATION
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     Cardiomyopathy (H)      Cellulitis of left lower extremity 3/13/2018     Cerebral infarction (H)     eye sight decreased in peripheral of right side and blurry     Diabetic ulcer of left midfoot associated with type 2 diabetes mellitus, with fat layer exposed (H) 3/13/2018     Essential hypertension, benign 2002     Generalized osteoarthrosis, unspecified site 2002     Microalbuminuria 3/13/2018    X1     Myocardial infarction (H)      Neuropathy      Sepsis (H)      Sleep apnea     doesn't use it     Substance abuse (H)      Syncope, unspecified syncope type 3/13/2018     Thyroid disease     takes medicine     Tobacco use disorder 2002     Type 2 diabetes mellitus with diabetic peripheral angiopathy without gangrene, unspecified long term insulin use status       Past Surgical History:   Procedure Laterality Date     AMPUTATE TOE(S) Right 2019    Procedure: Right open second toe partial amputation;  Surgeon: Sabino Garcia DPM;  Location: [facility] OR     AMPUTATE TOE(S) Right 2019    Procedure: 1.  Revision right second toe amputation with resection of distal half of proximal phalanx with full closure.    2.  Debridement of callus/preulcerative lesion, distal left second toe and plantar left first metatarsophalangeal joint.;  Surgeon: Olayinka  BRICE Davis;  Location: [facility] OR     AMPUTATE TOE(S) Right 3/12/2019    Procedure: Partial right fourth toe amputation.;  Surgeon: Ryan Bhagat DPM;  Location: [facility] OR     AMPUTATE TOE(S) Right 5/6/2019    Procedure: Right partial third toe amputation;  Surgeon: Татьяна Mckeon DPM, Podiatry/Foot and Ankle Surgery;  Location: [facility] OR     AMPUTATE TOE(S) Left 4/18/2020    Procedure: LEFT SECOND TOE AMPUTATION;  Surgeon: Ryan Bhagat DPM;  Location: [facility] OR     ANGIOGRAM  6/29/05    subtotal occ.mid LAD,SUSAN mid LAD     ANGIOGRAM  7/05    mild CAD,patent stent,no flow-limiting lesions,sev.LV dysf.LAD enlarged     ANGIOGRAM  2/09    Sev.single vessel disease,Mod LV dysf.distal LAD 90%,70-75% mid lad just before prev stent,SUSAN to prox.mid LAD, endeavor to distal LAD     ANGIOGRAM  11/13/13    restenosis, stent LAD     BACK SURGERY      back surgery x3     CV CORONARY ANGIOGRAM N/A 7/8/2019    Procedure: Coronary Angiogram;  Surgeon: Jose Alfredo Crockett MD;  Location:  HEART CARDIAC CATH LAB     CV CORONARY ANGIOGRAM N/A 4/9/2021    Procedure: CV CORONARY ANGIOGRAM;  Surgeon: Warren Paulson MD;  Location: [facility] CARDIAC CATH LAB     CV LEFT HEART CATH N/A 7/8/2019    Procedure: Left Heart Cath;  Surgeon: Jose Alfredo Crockett MD;  Location:  HEART CARDIAC CATH LAB     CV PCI ASPIRATION THROMECTOMY N/A 7/8/2019    Procedure: PCI Aspiration Thrombectomy;  Surgeon: Jose Alfredo Crockett MD;  Location:  HEART CARDIAC CATH LAB     CV PCI STENT DRUG ELUTING N/A 7/8/2019    Procedure: PCI Stent Drug Eluting;  Surgeon: Jose Alfredo Crockett MD;  Location:  HEART CARDIAC CATH LAB     HEART CATH LEFT HEART CATH  06/13/2017    SUSAN to OM3     INCISION AND DRAINAGE TOE, COMBINED Bilateral 9/11/2018    Procedure: COMBINED INCISION AND DRAINAGE TOE;  1) Irrigation and debridement ulcer left great toe  2) Amputation 3rd toe right foot at distal interphalangeal joint level;  Surgeon: Miki Harp MD;  Location:   OR     IRRIGATION AND DEBRIDEMENT FOOT, COMBINED Left 3/12/2019    Procedure: Debridement of left foot ulcer sub first MPJ 2 x 2.5 cm down to and including subcutaneous tissue, callus debridement for preulcerative lesion, left second toe.;  Surgeon: Ryan Bhagat DPM;  Location: [facility] OR     ORTHOPEDIC SURGERY      bunion surgery both feet     ORTHOPEDIC SURGERY      left shoulder     SOFT TISSUE SURGERY      debridement of toe numerous time     VASCULAR SURGERY      7 stents in heart     [facility] NONSPECIFIC PROCEDURE      Laminectomy x 3 - (1983 x 2 & 1990)     [facility] NONSPECIFIC PROCEDURE Bilateral 1998    Bunionectomy     [facility] NONSPECIFIC PROCEDURE  1959    Gingival surgery at age 9      Allergies   Allergen Reactions     No Known Allergies       Social History     Tobacco Use     Smoking status: Former Smoker     Packs/day: 1.00     Years: 25.00     Pack years: 25.00     Types: Cigarettes     Start date: 1980     Quit date: 7/25/2005     Years since quitting: 15.7     Smokeless tobacco: Never Used   Substance Use Topics     Alcohol use: Yes     Alcohol/week: 4.0 standard drinks     Types: 4 Shots of liquor per week     Frequency: Never     Comment: OCCASSIONALLY       Wt Readings from Last 1 Encounters:   05/07/21 117.5 kg (259 lb)        Anesthesia Evaluation   Pt has had prior anesthetic. Type: General.        ROS/MED HX  ENT/Pulmonary:     (+) sleep apnea, doesn't use CPAP,     Neurologic:     (+) CVA, with deficits, - eye sight decreased in peripheral of right side and blurry. TIA,     Cardiovascular: Comment: Anterior MI,  PTCA and stent placed in mid LAD    The left ventricle is normal in size. Proximal septal thickening is noted.  Left ventricular systolic function is moderately reduced. The visual ejection  fraction is estimated at 35-40%. LVEF 36% based on biplane 2D tracing.  Diastolic function not assessed due to atrial fibrillation. There is akinesis  of the mid to apical anterior walls, the mid  anteroseptal and apical septal  walls and the true LV apex. There is no thrombus seen in the left ventricle.  There is no thrombus seen in the left ventricle.  The right ventricle is normal size. Mildly decreased right ventricular  systolic function.  Trace to mild mitral and tricuspid regurgitation.  No pericardial effusion.      (+) hypertension--CAD angina-at rest. -stent-CHF     METS/Exercise Tolerance:     Hematologic: Comments: Lab Test        05/08/21     05/07/21     05/03/21     04/10/21     04/10/21     04/09/21                       0730          1841          1540          0542          0542          0441          WBC          6.7          6.2          4.7            < >         --           --           HGB          10.9*        11.7*        11.2*          < >         --           --           MCV          88           87           88             < >         --           --           PLT          209          225          202            < >         --           --           INR          1.23*         --           --           --          1.23*        1.36*          < > = values in this interval not displayed.                  Lab Test        05/08/21 05/07/21 05/03/21                       0730          1841          1540          NA           138          135          137           POTASSIUM    4.0          3.9          3.5           CHLORIDE     105          102          102           CO2          29           31           31            BUN          21           21           20            CR           1.09         1.12         1.22          ANIONGAP     4            2*           4             BETTIE          8.6          8.7          8.7           GLC          156*         249*         166*              (+) anemia,     Musculoskeletal: Comment: Diabetic ulcer of left midfoot associated with type 2 diabetes mellitus, with fat layer exposed      GI/Hepatic:  - neg GI/hepatic ROS      Renal/Genitourinary:       Endo:     (+) type II DM, Using insulin, - not using insulin pump. not previously admitted for DM/DKA. Diabetic complications: nephropathy neuropathy gastroparesis cardiac problems. thyroid problem, hypothyroidism,     Psychiatric/Substance Use:  - neg psychiatric ROS     Infectious Disease:  - neg infectious disease ROS     Malignancy:  - neg malignancy ROS     Other:  - neg other ROS          Physical Exam    Airway        Mallampati: II   TM distance: > 3 FB   Neck ROM: full   Mouth opening: > 3 cm    Respiratory Devices and Support         Dental  no notable dental history         Cardiovascular   cardiovascular exam normal          Pulmonary   pulmonary exam normal                OUTSIDE LABS:  CBC:   Lab Results   Component Value Date    WBC 6.7 05/08/2021    WBC 6.2 05/07/2021    HGB 10.9 (L) 05/08/2021    HGB 11.7 (L) 05/07/2021    HCT 33.7 (L) 05/08/2021    HCT 35.5 (L) 05/07/2021     05/08/2021     05/07/2021     BMP:   Lab Results   Component Value Date     05/08/2021     05/07/2021    POTASSIUM 4.0 05/08/2021    POTASSIUM 3.9 05/07/2021    CHLORIDE 105 05/08/2021    CHLORIDE 102 05/07/2021    CO2 29 05/08/2021    CO2 31 05/07/2021    BUN 21 05/08/2021    BUN 21 05/07/2021    CR 1.09 05/08/2021    CR 1.12 05/07/2021     (H) 05/08/2021     (H) 05/07/2021     COAGS:   Lab Results   Component Value Date    PTT 31 07/09/2019    INR 1.23 (H) 05/08/2021     POC:   Lab Results   Component Value Date     (H) 05/08/2021     HEPATIC:   Lab Results   Component Value Date    ALBUMIN 3.7 04/08/2021    PROTTOTAL 6.8 04/08/2021    ALT 30 04/08/2021    AST 14 04/08/2021    ALKPHOS 109 04/08/2021    BILITOTAL 1.4 (H) 04/08/2021     OTHER:   Lab Results   Component Value Date    LACT 1.1 05/03/2021    A1C 8.1 (H) 04/08/2021    BETTIE 8.6 05/08/2021    MAG 2.3 04/10/2021    LIPASE 23 11/02/2010    TSH 2.45 04/08/2021    CRP <2.9 09/30/2020    SED 8  09/30/2020       Anesthesia Plan    ASA Status:  3      Anesthesia Type: MAC.     - Reason for MAC: immobility needed   Induction: Propofol.   Maintenance: TIVA.        Consents    Anesthesia Plan(s) and associated risks, benefits, and realistic alternatives discussed. Questions answered and patient/representative(s) expressed understanding.     - Discussed with:  Patient      - Extended Intubation/Ventilatory Support Discussed: No.      - Patient is DNR/DNI Status: No    Use of blood products discussed: Yes.     - Discussed with: Patient.     - Consented: consented to blood products            Reason for refusal: other.     Postoperative Care    Pain management: IV analgesics.   PONV prophylaxis: Ondansetron (or other 5HT-3), Dexamethasone or Solumedrol     Comments:                Vinicio Rosas MD

## 2021-05-09 LAB
ANION GAP SERPL CALCULATED.3IONS-SCNC: 4 MMOL/L (ref 3–14)
BUN SERPL-MCNC: 20 MG/DL (ref 7–30)
CALCIUM SERPL-MCNC: 8.7 MG/DL (ref 8.5–10.1)
CHLORIDE SERPL-SCNC: 104 MMOL/L (ref 94–109)
CO2 SERPL-SCNC: 29 MMOL/L (ref 20–32)
CREAT SERPL-MCNC: 1.14 MG/DL (ref 0.66–1.25)
ERYTHROCYTE [DISTWIDTH] IN BLOOD BY AUTOMATED COUNT: 13.2 % (ref 10–15)
GFR SERPL CREATININE-BSD FRML MDRD: 64 ML/MIN/{1.73_M2}
GLUCOSE BLDC GLUCOMTR-MCNC: 128 MG/DL (ref 70–99)
GLUCOSE BLDC GLUCOMTR-MCNC: 153 MG/DL (ref 70–99)
GLUCOSE BLDC GLUCOMTR-MCNC: 180 MG/DL (ref 70–99)
GLUCOSE BLDC GLUCOMTR-MCNC: 189 MG/DL (ref 70–99)
GLUCOSE BLDC GLUCOMTR-MCNC: 215 MG/DL (ref 70–99)
GLUCOSE SERPL-MCNC: 135 MG/DL (ref 70–99)
HCT VFR BLD AUTO: 33.7 % (ref 40–53)
HGB BLD-MCNC: 10.7 G/DL (ref 13.3–17.7)
MCH RBC QN AUTO: 28.4 PG (ref 26.5–33)
MCHC RBC AUTO-ENTMCNC: 31.8 G/DL (ref 31.5–36.5)
MCV RBC AUTO: 89 FL (ref 78–100)
PLATELET # BLD AUTO: 202 10E9/L (ref 150–450)
POTASSIUM SERPL-SCNC: 3.8 MMOL/L (ref 3.4–5.3)
RBC # BLD AUTO: 3.77 10E12/L (ref 4.4–5.9)
SODIUM SERPL-SCNC: 137 MMOL/L (ref 133–144)
VANCOMYCIN SERPL-MCNC: 29.6 MG/L
WBC # BLD AUTO: 5.9 10E9/L (ref 4–11)

## 2021-05-09 PROCEDURE — 250N000013 HC RX MED GY IP 250 OP 250 PS 637: Performed by: PODIATRIST

## 2021-05-09 PROCEDURE — 999N001017 HC STATISTIC GLUCOSE BY METER IP

## 2021-05-09 PROCEDURE — 80202 ASSAY OF VANCOMYCIN: CPT | Performed by: INTERNAL MEDICINE

## 2021-05-09 PROCEDURE — 250N000013 HC RX MED GY IP 250 OP 250 PS 637: Performed by: INTERNAL MEDICINE

## 2021-05-09 PROCEDURE — 99233 SBSQ HOSP IP/OBS HIGH 50: CPT | Performed by: INTERNAL MEDICINE

## 2021-05-09 PROCEDURE — 85027 COMPLETE CBC AUTOMATED: CPT | Performed by: INTERNAL MEDICINE

## 2021-05-09 PROCEDURE — 250N000011 HC RX IP 250 OP 636: Performed by: PODIATRIST

## 2021-05-09 PROCEDURE — 250N000011 HC RX IP 250 OP 636: Performed by: INTERNAL MEDICINE

## 2021-05-09 PROCEDURE — 80048 BASIC METABOLIC PNL TOTAL CA: CPT | Performed by: INTERNAL MEDICINE

## 2021-05-09 PROCEDURE — 120N000001 HC R&B MED SURG/OB

## 2021-05-09 PROCEDURE — 99232 SBSQ HOSP IP/OBS MODERATE 35: CPT | Performed by: PODIATRIST

## 2021-05-09 PROCEDURE — 36415 COLL VENOUS BLD VENIPUNCTURE: CPT | Performed by: INTERNAL MEDICINE

## 2021-05-09 PROCEDURE — 258N000003 HC RX IP 258 OP 636: Performed by: PODIATRIST

## 2021-05-09 PROCEDURE — 250N000012 HC RX MED GY IP 250 OP 636 PS 637: Performed by: PODIATRIST

## 2021-05-09 RX ORDER — LISINOPRIL 2.5 MG/1
2.5 TABLET ORAL DAILY
Status: DISCONTINUED | OUTPATIENT
Start: 2021-05-09 | End: 2021-05-10 | Stop reason: HOSPADM

## 2021-05-09 RX ADMIN — INSULIN GLARGINE 44 UNITS: 100 INJECTION, SOLUTION SUBCUTANEOUS at 08:37

## 2021-05-09 RX ADMIN — AMPICILLIN SODIUM AND SULBACTAM SODIUM 3 G: 2; 1 INJECTION, POWDER, FOR SOLUTION INTRAMUSCULAR; INTRAVENOUS at 16:12

## 2021-05-09 RX ADMIN — AMPICILLIN SODIUM AND SULBACTAM SODIUM 3 G: 2; 1 INJECTION, POWDER, FOR SOLUTION INTRAMUSCULAR; INTRAVENOUS at 03:31

## 2021-05-09 RX ADMIN — POTASSIUM CHLORIDE 20 MEQ: 1500 TABLET, EXTENDED RELEASE ORAL at 08:37

## 2021-05-09 RX ADMIN — OXYCODONE HYDROCHLORIDE 10 MG: 5 TABLET ORAL at 03:31

## 2021-05-09 RX ADMIN — Medication 1 MG: at 00:01

## 2021-05-09 RX ADMIN — CARVEDILOL 25 MG: 25 TABLET, FILM COATED ORAL at 08:37

## 2021-05-09 RX ADMIN — ISOSORBIDE MONONITRATE 30 MG: 30 TABLET, EXTENDED RELEASE ORAL at 08:36

## 2021-05-09 RX ADMIN — INSULIN ASPART 1 UNITS: 100 INJECTION, SOLUTION INTRAVENOUS; SUBCUTANEOUS at 12:45

## 2021-05-09 RX ADMIN — AMPICILLIN SODIUM AND SULBACTAM SODIUM 3 G: 2; 1 INJECTION, POWDER, FOR SOLUTION INTRAMUSCULAR; INTRAVENOUS at 22:22

## 2021-05-09 RX ADMIN — AMPICILLIN SODIUM AND SULBACTAM SODIUM 3 G: 2; 1 INJECTION, POWDER, FOR SOLUTION INTRAMUSCULAR; INTRAVENOUS at 10:34

## 2021-05-09 RX ADMIN — VANCOMYCIN HYDROCHLORIDE 2000 MG: 10 INJECTION, POWDER, LYOPHILIZED, FOR SOLUTION INTRAVENOUS at 06:45

## 2021-05-09 RX ADMIN — INSULIN ASPART 2 UNITS: 100 INJECTION, SOLUTION INTRAVENOUS; SUBCUTANEOUS at 18:21

## 2021-05-09 RX ADMIN — FERROUS SULFATE TAB 325 MG (65 MG ELEMENTAL FE) 325 MG: 325 (65 FE) TAB at 08:36

## 2021-05-09 RX ADMIN — LISINOPRIL 2.5 MG: 2.5 TABLET ORAL at 16:14

## 2021-05-09 RX ADMIN — PANTOPRAZOLE SODIUM 40 MG: 40 TABLET, DELAYED RELEASE ORAL at 06:45

## 2021-05-09 RX ADMIN — FUROSEMIDE 40 MG: 40 TABLET ORAL at 08:36

## 2021-05-09 RX ADMIN — CARVEDILOL 25 MG: 25 TABLET, FILM COATED ORAL at 18:22

## 2021-05-09 RX ADMIN — FUROSEMIDE 40 MG: 40 TABLET ORAL at 16:14

## 2021-05-09 RX ADMIN — ATORVASTATIN CALCIUM 40 MG: 40 TABLET, FILM COATED ORAL at 22:22

## 2021-05-09 RX ADMIN — LEVOTHYROXINE SODIUM 137 MCG: 137 TABLET ORAL at 08:36

## 2021-05-09 ASSESSMENT — ACTIVITIES OF DAILY LIVING (ADL)
ADLS_ACUITY_SCORE: 15
ADLS_ACUITY_SCORE: 17
ADLS_ACUITY_SCORE: 15
ADLS_ACUITY_SCORE: 17

## 2021-05-09 NOTE — OP NOTE
Procedure Date: 05/08/2021    SURGEON:  Kwasi Root DPM    PREOPERATIVE DIAGNOSES:  1.  Ulceration, cellulitis, osteomyelitis, and hammertoe deformity, left third toe.  2.  Chronic ulceration, plantar left foot.    POSTOPERATIVE DIAGNOSES:  1.  Ulceration, cellulitis, osteomyelitis and hammertoe deformity, left third toe.  2.  Chronic ulceration, plantar left foot.    PROCEDURES:  1.  Amputation of the left third toe at the level of the proximal interphalangeal joint.  2.  Excisional debridement of less than 20 square cm down to the level of the deeper skin, plantar left foot.    ANESTHESIA:  MAC with local.    HEMOSTASIS:  None.    ESTIMATED BLOOD LOSS:  2 mL.    MATERIALS:  Nonabsorbable suture material.    INJECTABLES:  Marcaine 0.5% plain.    COMPLICATIONS:  None apparent.     INTRAOPERATIVE FINDINGS:  All tissues at the level of amputation, left third toe, appear grossly viable and free of infection.  The plantar ulceration under the left first metatarsal head had a depth to deeper skin and was without clinical signs of infection.    INDICATIONS FOR PROCEDURE:  Jayro Elder is a 70-year-old male with uncontrolled type 2 diabetes, peripheral neuropathy, history of multiple diabetes related wounds, debridements, and amputations, heart failure with an ejection fraction of 40%, chronic kidney disease stage III, paroxysmal atrial fibrillation on Eliquis, coronary artery disease, hypertension, hyperlipidemia, hypothyroidism, gastroesophageal reflux disease, and iron deficiency anemia, who was admitted from Dr. Mckeon's office yesterday due to osteomyelitis of the left third toe.  An MRI was completed showing osteomyelitis of the distal phalanx.  There was a deep wound in this region.  The toe was erythematous and edematous.  Amputation was discussed by Dr. Mckeon and the patient was agreeable to the procedure and admission.  Prior to surgery this morning, I reviewed our primary goal: removing the toe, treating and  curing the infection.  I also explained that primary closure is a goal.  An ulceration was noted on the plantar aspect of the left first metatarsal head region.  I suggested excisional debridement.  He was agreeable to this.  No guarantees were given.    DESCRIPTION OF PROCEDURE:  Jayro Elder was transported to the operating room and placed supine on the operating table.  IV sedation was initiated.  Local anesthetic was injected into the left foot.  The left foot was then prepped and draped in the normal aseptic fashion.  A timeout for both procedures was called.    Attention was first directed to the left third toe.  A fish-mouth type incision was made just distal to the proximal interphalangeal joint.  The incision was taken down to the level of bone.  The toe was disarticulated at the proximal interphalangeal joint.  With the incision, a longer plantar flap was created.  The wound was irrigated with a copious amount of normal sterile saline.  Due to the severe contracture of the toe, the plantar flap was abnormal and redundant tissue was excised.  Ultimately, the wound was closed without difficulty with an incision that curves plantar at the lateral aspect due to the skin contracture.  A well-padded compressive dressing was placed.    Using a fresh #15 scalpel, excisional debridement of the plantar left forefoot wound was performed.  Excisional debridement was carried out involving an area less than 20 square cm and down to the depth of deeper skin.  There was mild bleeding.  The area was irrigated with a copious amount of normal sterile saline.  Post-debridement, the ulceration is considered to be 1.5 cm in diameter.  A well-padded compressive dressing was placed.    Donning fresh sterile instrumentation, bone was harvested from the distal phalanx and sent for aerobic and anaerobic culture.  Nonetheless, all bone infection is considered to be removed.  There was less bleeding than expected in a history of  noninvasive vascular testing.  Should there be any difficulties in healing in the weeks to come, I will likely refer him to Vascular Surgery.     Kwasi Root DPM        D: 2021   T: 2021   MT: QUINTEN    Name:     PORTER YANCEY  MRN:      [id number]        Account:        [id number]   :      1950           Procedure Date: 2021     Document: [id number]

## 2021-05-09 NOTE — PROGRESS NOTES
[facility] PODIATRY/FOOT & ANKLE SURGERY    Subjective:  No complaints.     Patient Active Problem List   Diagnosis     Generalized osteoarthrosis, unspecified site     Tobacco use disorder     Hypertension goal BP (blood pressure) < 140/90     Benign neoplasm of lower jaw bone     Abdominal pain, right upper quadrant     Diabetes mellitus, type 2 (H)     Cardiovascular disease     CARDIOVASCULAR SCREENING; LDL GOAL LESS THAN 100     Health Care Home     Cardiomyopathy (H)     CVA (cerebral vascular accident) (H)     Coronary artery disease involving native coronary artery of native heart with angina pectoris (H)     Status post coronary angiogram     Chronic atrial fibrillation (H)     Homonymous hemianopsia, right     Cellulitis of left lower extremity     Syncope, unspecified syncope type     Diabetic ulcer of left midfoot associated with type 2 diabetes mellitus, with fat layer exposed (H)     Type 2 diabetes mellitus with diabetic peripheral angiopathy without gangrene (H)     Microalbuminuria     Pressure ulcer of toe of right foot, stage 3 (H)     Cellulitis     Bacteremia     CHF (congestive heart failure) (H)     History of CVA (cerebrovascular accident)     Hyperlipidemia     Hypothyroidism     Non-toxic nodular goiter     Acute osteomyelitis (H)     Post-operative state     Acute chest pain     Unstable angina (H)     Hypertension     H/O amputation of lesser toe, right (H)     Atrial fibrillation, unspecified type (H)     Diabetic infection of left foot (H)     Wound infection     Orthostatic hypotension     Dizziness     Acute on chronic systolic congestive heart failure (H)     Chest pain, unspecified type     Diabetic polyneuropathy associated with type 2 diabetes mellitus (H)     Ulcer of left foot, with necrosis of bone (H)     Osteomyelitis of third toe of left foot (H)     Hammertoes of both feet     H/O amputation of lesser toe, left (H)     Osteomyelitis (H)       Objective:  B/P: 123/69,  T: 97.5, P: 63, R: 16    Left foot:  Substantial strike-through bleeding on dressing  Pedal pulses palpable; bleeding primarily from area of ulcer debridement, sub first metatarsal head.  Sutures intact, left third toe partial amp site  No erythema  Light touch sensation profoundly diminished  Partial amputation of the 2nd and 3rd toes  Post OP XR: stable; new baseline    All cultures:  Recent Labs   Lab 05/08/21  1114   CULT Culture negative monitoring continues  Culture in progress     Assessment:   70-year-old male with uncontrolled type 2 diabetes, peripheral neuropathy, history of multiple diabetes related wounds, debridements, and amputations, heart failure with an ejection fraction of 40%, chronic kidney disease stage III, paroxysmal atrial fibrillation on Eliquis, coronary artery disease, hypertension, hyperlipidemia, hypothyroidism, gastroesophageal reflux disease, and iron deficiency anemia status post partial left third toe amputation for treatment of osteomyelitis and excisional debridement of left foot plantar ulcer.    Plan:   Sterile re-dress of left foot  IV Unasyn and vancomycin  Likely discharge tomorrow on PO Augmentin  All bone infection is considered removed.  Ambulation in DH2 shoe    Kwasi Root DPM, FACSHAWN, MS  M [facility] Department of Podiatry/Foot & Ankle Surgery  [phone]

## 2021-05-09 NOTE — PROGRESS NOTES
[facility]    Medicine Progress Note - Hospitalist Service       Date of Admission:  5/7/2021  Assessment & Plan       Jayro Elder is a 70 year old male with past medical history of multiple bilateral foot wounds status post multiple amputations and debridements, uncontrolled diabetes mellitus type 2 with insulin dependence, heart failure with reduced ejection fraction with EF of 40%, chronic kidney disease stage IIIa, paroxysmal atrial fibrillation on Eliquis, coronary artery disease, hypertension, hyperlipidemia, hypothyroidism, GERD, and iron deficiency anemia.  He presented to emergency room on 5/7 with left foot wound.  Found to have left foot osteomyelitis.  Started on antibiotics.  Seen in consultation by podiatry.  Underwent  left third toe amputation and excisional debridement of left foot on 5/8.     1.  Left foot osteomyelitis.  Seen in consultation by podiatry.  -Status post left third toe amputation and excisional debridement on 5/8.  -Continue IV vancomycin and Unasyn.  -Cultures from surgery on 5/8 still pending.     2.  Diabetes mellitus.    -Continue Lantus.  -Change scheduled NovoLog to carb counting 1 unit per 15 g of carbohydrates with meals.  -Continue NovoLog sliding scale.     3.  Chronic heart failure with reduced ejection fraction.  No acute exacerbation.  -Continue oral furosemide.  -Continue carvedilol.  -Restart lisinopril 2.5 mg a day.     4.  Hypertension.  Continue carvedilol, furosemide, and isosorbide mononitrate.   -Restart lisinopril at 2.5 mg a day.     5.  GERD.  Continue pantoprazole.     6.  Hypothyroidism.  Continue levothyroxine.     7.  Hyperlipidemia.  Continue atorvastatin.     8.  Coronary artery disease.    -Continue atorvastatin and carvedilol.  -Restart clopidogrel when okay with podiatry.     9.  Chronic kidney disease stage IIIa.  Creatinine currently slightly better than baseline.  -Avoid nephrotoxins as able.     10.  Paroxysmal atrial  fibrillation.  -Continue carvedilol.  -Restart apixaban when okay with podiatry.       Diet: Moderate Consistent CHO Diet    DVT Prophylaxis: Pneumatic Compression Devices  Taylor Catheter: not present  Code Status: Full Code           Disposition Plan   Expected discharge: Tomorrow, recommended to prior living arrangement     Xiang Baumann DO  Hospitalist Service  [facility]  Contact information available via [facility] Paging/Directory    ______________________________________________________________________    Interval History   Some left foot pain.  Denies chest pain, shortness of breath, fevers, chills, nausea, vomiting, or diarrhea.    Data reviewed today: I reviewed all medications, new labs and imaging results over the last 24 hours.     Physical Exam   Vital Signs: Temp: 97.5  F (36.4  C) Temp src: Temporal BP: 123/69 Pulse: 63   Resp: 16 SpO2: 98 % O2 Device: None (Room air)    Weight: 0 lbs 0 oz  Gen:  NAD, A&Ox3.  Eyes:  PERRL, sclera anicteric.  OP:  MMM, no lesions.  Neck:  Supple.  CV:  Regular, no murmurs.  Lung:  CTA b/l, normal effort.  Ab:  +BS, soft.  Skin:  Warm, dry to touch.  No rash.  Left foot in dressing which is not removed.  Ext:  No pitting edema LE b/l.      Data   Recent Labs   Lab 05/09/21  0712 05/08/21  0730 05/07/21  1841   WBC 5.9 6.7 6.2   HGB 10.7* 10.9* 11.7*   MCV 89 88 87    209 225   INR  --  1.23*  --     138 135   POTASSIUM 3.8 4.0 3.9   CHLORIDE 104 105 102   CO2 29 29 31   BUN 20 21 21   CR 1.14 1.09 1.12   ANIONGAP 4 4 2*   BETTIE 8.7 8.6 8.7   * 156* 249*

## 2021-05-09 NOTE — PHARMACY-VANCOMYCIN DOSING SERVICE
Pharmacy Vancomycin Note  Date of Service May 9, 2021  Patient's  1950   70 year old, male    Indication: Osteomyelitis  Day of Therapy: 3  Current vancomycin regimen:  2000 mg IV q12h  Current vancomycin monitoring method: Trough (Method 1 = dosing nomogram)  Current vancomycin therapeutic monitoring goal: 15-20 mg/L    Current estimated CrCl = Estimated Creatinine Clearance: 84.5 mL/min (based on SCr of 1.14 mg/dL).    Creatinine for last 3 days  2021:  6:41 PM Creatinine 1.12 mg/dL  2021:  7:30 AM Creatinine 1.09 mg/dL  2021:  7:12 AM Creatinine 1.14 mg/dL    Recent Vancomycin Levels (past 3 days)  2021:  5:52 PM Vancomycin Level 29.6 mg/L    Vancomycin IV Administrations (past 72 hours)                   vancomycin (VANCOCIN) 2,000 mg in sodium chloride 0.9 % 500 mL intermittent infusion (mg) 2,000 mg New Bag 21 0645     2,000 mg New Bag 21 1828     2,000 mg New Bag  0650     2,000 mg New Bag 21 1850                Nephrotoxins and other renal medications (From now, onward)    Start     Dose/Rate Route Frequency Ordered Stop    21 1530  lisinopril (ZESTRIL) tablet 2.5 mg      2.5 mg Oral DAILY 21 1504      21 1600  ampicillin-sulbactam (UNASYN) 3 g vial to attach to  mL bag      3 g  over 15-30 Minutes Intravenous EVERY 6 HOURS 21 1503      21 0800  furosemide (LASIX) tablet 40 mg      40 mg Oral 2 TIMES DAILY. 21 2317      21 1800  vancomycin (VANCOCIN) 2,000 mg in sodium chloride 0.9 % 500 mL intermittent infusion      2,000 mg  over 2 Hours Intravenous EVERY 12 HOURS 21 1727               Contrast Orders - past 72 hours (72h ago, onward)    None          Interpretation of levels and current regimen:  Vancomycin level is reflective of supratherapeutic level    Has serum creatinine changed greater than 50% in last 72 hours: No      Renal Function: Stable      Plan:  1. Hold dose and assess another level in the  AM  2. Vancomycin monitoring method: Trough (Method 1 = dosing nomogram)  3. Vancomycin therapeutic monitoring goal: 15-20 mg/L  4. Pharmacy will check vancomycin levels as appropriate in 1-3 Days.  5. Serum creatinine levels will be ordered daily for the first week of therapy and at least twice weekly for subsequent weeks.    Danilo Del Real RPH

## 2021-05-09 NOTE — CONSULTS
Care Management Initial Consult    General Information  Assessment completed with: Jayro Iyer  Type of CM/SW Visit: Initial Assessment    Primary Care Provider verified and updated as needed: Yes(None)   Readmission within the last 30 days: no previous admission in last 30 days   Return Category: New Diagnosis  Reason for Consult: care coordination/care conference, discharge planning  Advance Care Planning: Advance Care Planning Reviewed: no concerns identified        Communication Assessment  Patient's communication style: spoken language (English or Bilingual)    Hearing Difficulty or Deaf: no   Wear Glasses or Blind: yes    Cognitive  Cognitive/Neuro/Behavioral: WDL                      Living Environment:   People in home: child(ernesto), adult, spouse  Jayro, Aixa & Beck  Current living Arrangements: house      Able to return to prior arrangements: yes     Family/Social Support:  Care provided by: spouse/significant other, child(ernesto)  Provides care for: no one  Marital Status:   Wife, Children  Aixa       Description of Support System: Supportive, Involved       Current Resources:   Patient receiving home care services: No     Community Resources: None  Equipment currently used at home: cane, quad, lift device, walker, rolling, other (see comments)(Stairway lift device)  Supplies currently used at home:      Employment/Financial:  Employment Status: retired        Financial Concerns: No concerns identified      Lifestyle & Psychosocial Needs:     Socioeconomic History     Marital status:      Spouse name: Not on file     Number of children: Not on file     Years of education: Not on file     Highest education level: Not on file     Tobacco Use     Smoking status: Former Smoker     Packs/day: 1.00     Years: 25.00     Pack years: 25.00     Types: Cigarettes     Start date: 1980     Quit date: 7/25/2005     Years since quitting: 15.8     Smokeless tobacco: Never Used   Substance and Sexual  Activity     Alcohol use: Yes     Alcohol/week: 4.0 standard drinks     Types: 4 Shots of liquor per week     Frequency: Never     Comment: OCCASSIONALLY      Drug use: No     Sexual activity: Yes     Partners: Female     Functional Status:  Prior to admission patient needed assistance: Yes. He has installed a stairway lift device. He also uses a tri-cane with 3 feet on it. He has a 4WW.      Mental Health Status:  Mental Health Status: No Current Concerns       Chemical Dependency Status:  Chemical Dependency Status: No Current Concerns           Values/Beliefs:  Spiritual, Cultural Beliefs, Hoahaoism Practices, Values that affect care: no       Additional Information:  CM met with patient at bedside. Introduced self & role. Patient lives in a house with a stairway lift. He uses a tri-cane (3 feet) and a 4WW. His support system is his wife, Aixa, and son, Beck. He is retired. He has no issues with getting his prescriptions filled at the [facility] in [place] ([facility] location). He follows with Dr. Lorenzo King for Cardiology; Endocrinology through the [facility] through [facility] (both Dr. Mckeon & Dr. Bhagat have seen patient).     He declined 's offer to provide resources for a PCP in the [facility]. He follows with the specialties he needs. He was not receptive to needing a PCP to coordinating all his medical needs.     CM will continue to follow patient until discharge for any additional needs.     Skye Deleon RN, BSN, CPHN, CM  Inpatient Care Coordination - M/S, [facility]  [phone]

## 2021-05-09 NOTE — PROVIDER NOTIFICATION
DATE:  5/9/2021   TIME OF RECEIPT FROM LAB:  1841  LAB TEST:  Vanco level  LAB VALUE:  29.6  RESULTS PAGED TO (PROVIDER):  Hospitalist pager, pharmacy notified   TIME LAB VALUE REPORTED TO PROVIDER:   5633

## 2021-05-10 VITALS
HEART RATE: 74 BPM | RESPIRATION RATE: 16 BRPM | WEIGHT: 263 LBS | TEMPERATURE: 98.3 F | BODY MASS INDEX: 32.01 KG/M2 | SYSTOLIC BLOOD PRESSURE: 161 MMHG | OXYGEN SATURATION: 97 % | DIASTOLIC BLOOD PRESSURE: 83 MMHG

## 2021-05-10 LAB
CREAT SERPL-MCNC: 1.16 MG/DL (ref 0.66–1.25)
GFR SERPL CREATININE-BSD FRML MDRD: 63 ML/MIN/{1.73_M2}
GLUCOSE BLDC GLUCOMTR-MCNC: 121 MG/DL (ref 70–99)
GLUCOSE BLDC GLUCOMTR-MCNC: 156 MG/DL (ref 70–99)
GLUCOSE BLDC GLUCOMTR-MCNC: 98 MG/DL (ref 70–99)
GLUCOSE SERPL-MCNC: 114 MG/DL (ref 70–99)
VANCOMYCIN SERPL-MCNC: 18.7 MG/L

## 2021-05-10 PROCEDURE — 250N000011 HC RX IP 250 OP 636: Performed by: INTERNAL MEDICINE

## 2021-05-10 PROCEDURE — 250N000013 HC RX MED GY IP 250 OP 250 PS 637: Performed by: INTERNAL MEDICINE

## 2021-05-10 PROCEDURE — 82947 ASSAY GLUCOSE BLOOD QUANT: CPT | Performed by: INTERNAL MEDICINE

## 2021-05-10 PROCEDURE — 250N000012 HC RX MED GY IP 250 OP 636 PS 637: Performed by: PODIATRIST

## 2021-05-10 PROCEDURE — 36415 COLL VENOUS BLD VENIPUNCTURE: CPT | Performed by: INTERNAL MEDICINE

## 2021-05-10 PROCEDURE — 80202 ASSAY OF VANCOMYCIN: CPT | Performed by: PODIATRIST

## 2021-05-10 PROCEDURE — 99231 SBSQ HOSP IP/OBS SF/LOW 25: CPT | Performed by: PODIATRIST

## 2021-05-10 PROCEDURE — 36415 COLL VENOUS BLD VENIPUNCTURE: CPT | Performed by: PODIATRIST

## 2021-05-10 PROCEDURE — 99239 HOSP IP/OBS DSCHRG MGMT >30: CPT | Performed by: INTERNAL MEDICINE

## 2021-05-10 PROCEDURE — 999N001017 HC STATISTIC GLUCOSE BY METER IP

## 2021-05-10 PROCEDURE — 82565 ASSAY OF CREATININE: CPT | Performed by: PODIATRIST

## 2021-05-10 PROCEDURE — 250N000013 HC RX MED GY IP 250 OP 250 PS 637: Performed by: PODIATRIST

## 2021-05-10 PROCEDURE — 0Y6U0Z0 DETACHMENT AT LEFT 3RD TOE, COMPLETE, OPEN APPROACH: ICD-10-PCS | Performed by: PODIATRIST

## 2021-05-10 PROCEDURE — 0HBNXZZ EXCISION OF LEFT FOOT SKIN, EXTERNAL APPROACH: ICD-10-PCS | Performed by: PODIATRIST

## 2021-05-10 RX ORDER — CEFAZOLIN SODIUM 1 G/50ML
2000 SOLUTION INTRAVENOUS
Status: DISCONTINUED | OUTPATIENT
Start: 2021-05-10 | End: 2021-05-10

## 2021-05-10 RX ORDER — AMOXICILLIN 250 MG
1 CAPSULE ORAL 2 TIMES DAILY PRN
Qty: 30 TABLET | Refills: 0 | Status: SHIPPED | OUTPATIENT
Start: 2021-05-10 | End: 2021-06-24

## 2021-05-10 RX ORDER — OXYCODONE HYDROCHLORIDE 5 MG/1
5 TABLET ORAL EVERY 6 HOURS PRN
Qty: 8 TABLET | Refills: 0 | Status: SHIPPED | OUTPATIENT
Start: 2021-05-10 | End: 2021-06-24

## 2021-05-10 RX ORDER — VANCOMYCIN HYDROCHLORIDE 1 G/200ML
1000 INJECTION, SOLUTION INTRAVENOUS EVERY 12 HOURS
Status: DISCONTINUED | OUTPATIENT
Start: 2021-05-10 | End: 2021-05-10

## 2021-05-10 RX ADMIN — AMPICILLIN SODIUM AND SULBACTAM SODIUM 3 G: 2; 1 INJECTION, POWDER, FOR SOLUTION INTRAMUSCULAR; INTRAVENOUS at 04:33

## 2021-05-10 RX ADMIN — INSULIN ASPART 1 UNITS: 100 INJECTION, SOLUTION INTRAVENOUS; SUBCUTANEOUS at 12:34

## 2021-05-10 RX ADMIN — POTASSIUM CHLORIDE 20 MEQ: 1500 TABLET, EXTENDED RELEASE ORAL at 10:12

## 2021-05-10 RX ADMIN — VANCOMYCIN HYDROCHLORIDE 1000 MG: 1 INJECTION, SOLUTION INTRAVENOUS at 11:48

## 2021-05-10 RX ADMIN — FERROUS SULFATE TAB 325 MG (65 MG ELEMENTAL FE) 325 MG: 325 (65 FE) TAB at 10:11

## 2021-05-10 RX ADMIN — CARVEDILOL 25 MG: 25 TABLET, FILM COATED ORAL at 10:13

## 2021-05-10 RX ADMIN — LISINOPRIL 2.5 MG: 2.5 TABLET ORAL at 10:12

## 2021-05-10 RX ADMIN — PANTOPRAZOLE SODIUM 40 MG: 40 TABLET, DELAYED RELEASE ORAL at 10:11

## 2021-05-10 RX ADMIN — LEVOTHYROXINE SODIUM 137 MCG: 137 TABLET ORAL at 10:15

## 2021-05-10 RX ADMIN — INSULIN GLARGINE 44 UNITS: 100 INJECTION, SOLUTION SUBCUTANEOUS at 10:10

## 2021-05-10 RX ADMIN — AMPICILLIN SODIUM AND SULBACTAM SODIUM 3 G: 2; 1 INJECTION, POWDER, FOR SOLUTION INTRAMUSCULAR; INTRAVENOUS at 10:16

## 2021-05-10 RX ADMIN — ISOSORBIDE MONONITRATE 30 MG: 30 TABLET, EXTENDED RELEASE ORAL at 10:12

## 2021-05-10 ASSESSMENT — ACTIVITIES OF DAILY LIVING (ADL)
ADLS_ACUITY_SCORE: 17

## 2021-05-10 NOTE — DISCHARGE SUMMARY
[facility]  Hospitalist Discharge Summary      Date of Admission:  5/7/2021  Date of Discharge:  5/10/2021  Discharging Provider: Xiang Baumann DO      Discharge Diagnoses   1.  Left third toe osteomyelitis.  2.  Left third toe amputation and excisional debridement.  3.  Diabetes mellitus type 2.  4.  Chronic heart failure with reduced ejection fraction.  5.  Hypertension.  6.  Hyperlipidemia.  7.  Hypothyroidism.  8.  GERD.  9.  Coronary artery disease.  10.  Chronic kidney disease stage IIIa.  11.  Paroxysmal atrial fibrillation.      Follow-ups Needed After Discharge   Follow-up Appointments     Follow Up      Follow up with Dr. Vásquez in 1-2 weeks.    Thank you for choosing [facility] / [facility]!    DR. VÁSQUEZ'S CLINIC LOCATIONS    [facility] SCHEDULE SURGERY: [phone]  [address] [place] [address] APPOINTMENTS: [phone]  [place] [address] BILLING QUESTIONS: [phone] RADIOLOGY: [phone]     [place]   [id number] [place] [address] [id number]   [place] [address]   [phone]  [phone]         Follow-up and recommended labs and tests       Follow up with podiatry within 7 days to evaluate after surgery.             Discharge Disposition   Discharged to home  Condition at discharge: Stable      Hospital Course   Jayro Elder is a 70 year old male with past medical history of multiple bilateral foot wounds status post multiple amputations and debridements, uncontrolled diabetes mellitus type 2 with insulin dependence, heart failure with reduced ejection fraction with EF of 40%, chronic kidney disease stage IIIa, paroxysmal atrial fibrillation on Eliquis, coronary artery disease, hypertension, hyperlipidemia, hypothyroidism, GERD, and iron deficiency anemia.  He presented to emergency room on 5/7 with left foot wound.  Found to have left foot osteomyelitis.  Started on antibiotics with IV vancomycin and Unasyn.  Seen in  consultation by podiatry.  Underwent  left third toe amputation and excisional debridement of left foot on 5/8.  He continues to improve after surgery.  He does have a bone culture from 5/8 that is growing staph aureus.  I did discuss this with Dr. Vásquez of podiatry on 5/10.  It is expected that all infection was removed with the amputation.  It is recommended that he continue on Augmentin at this time.  He will need to follow-up in clinic in approximately 1 week for further evaluation.    Consultations This Hospital Stay   CARE MANAGEMENT / SOCIAL WORK IP CONSULT  PHARMACY TO DOSE VANCO  PODIATRY IP CONSULT  WOUND OSTOMY CONTINENCE NURSE  IP CONSULT  MEDICATION HISTORY IP PHARMACY CONSULT  ORTHOSIS EXTREMITY LOWER REFERRAL IP CONSULT    Code Status   Full Code    Time Spent on this Encounter   I spent 40 minutes with Mr. Elder and working on discharge on 5/10/2021.       Xiang Baumann, DO  [facility] ORTHO SPINE  [address] [facility] [address]  Phone: [phone]  Fax: [phone]  ______________________________________________________________________    Physical Exam   Vital Signs: Temp: 98.3  F (36.8  C) Temp src: Temporal BP: (!) 161/83 Pulse: 74   Resp: 16 SpO2: 97 % O2 Device: None (Room air)    Weight: 263 lbs 0 oz  Gen:  NAD, A&Ox3.  Eyes:  PERRL, sclera anicteric.  OP:  MMM, no lesions.  Neck:  Supple.  CV:  Regular, no murmurs.  Lung:  CTA b/l, normal effort.  Ab:  +BS, soft.  Skin:  Warm, dry to touch.  No rash.  Left foot dressing not removed.  Ext:  No pitting edema LE b/l.         Primary Care Physician   Physician No Ref-Primary    Discharge Orders      Medication Therapy Management Referral      Follow Up    Follow up with Dr. Vásquez in 1-2 weeks.    Thank you for choosing [facility] / Foot [facility]le Surgery!    DR. VÁSQUEZ'S CLINIC LOCATIONS    [facility] SCHEDULE SURGERY: [phone]  [address] [place] [address] APPOINTMENTS: [phone]  [place]  [address] BILLING QUESTIONS: [phone] RADIOLOGY: [phone]     [place]   [id number]   [place] [address]   [phone]  [phone]     Dressing    Dressing placed on day of discharge can stay on until follow up in clinic.     Activity    Weight bearing as tolerated in the surgical shoe     Reason for your hospital stay    Left toe osteomyelitis (infection)     Follow-up and recommended labs and tests     Follow up with podiatry within 7 days to evaluate after surgery.     Full Code     Diet    Follow this diet upon discharge: Moderate consistent carbohydrate (0488-8324 javier / 4-6 CHO units per meal)           Discharge Medications   Current Discharge Medication List      START taking these medications    Details   amoxicillin-clavulanate (AUGMENTIN) 875-125 MG tablet Take 1 tablet by mouth every 12 hours for 7 days  Qty: 14 tablet, Refills: 0    Associated Diagnoses: Osteomyelitis of left foot, unspecified type (H)      oxyCODONE (ROXICODONE) 5 MG tablet Take 1 tablet (5 mg) by mouth every 6 hours as needed for moderate to severe pain  Qty: 8 tablet, Refills: 0    Associated Diagnoses: Osteomyelitis of left foot, unspecified type (H)      senna-docusate (SENOKOT-S/PERICOLACE) 8.6-50 MG tablet Take 1 tablet by mouth 2 times daily as needed for constipation  Qty: 30 tablet, Refills: 0    Associated Diagnoses: Osteomyelitis of left foot, unspecified type (H)         CONTINUE these medications which have NOT CHANGED    Details   apixaban ANTICOAGULANT (ELIQUIS) 5 MG tablet Take 1 tablet (5 mg) by mouth 2 times daily    Associated Diagnoses: Chronic atrial fibrillation (H)      atorvastatin (LIPITOR) 40 MG tablet Take 1 tablet (40 mg) by mouth every evening  Qty: 90 tablet, Refills: 3    Associated Diagnoses: Cerebrovascular accident (CVA) due to embolism of left posterior cerebral artery (H)      carvedilol (COREG) 25 MG tablet Take 1 tablet (25 mg) by mouth 2  times daily (with meals)  Qty: 180 tablet, Refills: 3    Associated Diagnoses: Essential hypertension      clopidogrel (PLAVIX) 75 MG tablet Take 1 tablet (75 mg) by mouth daily  Qty: 90 tablet, Refills: 3    Associated Diagnoses: Unstable angina (H)      furosemide (LASIX) 40 MG tablet Take 80mg (2 tabs) every morning, and 40mg (1 tab) every afternoon.  Qty: 90 tablet, Refills: 4    Associated Diagnoses: Ischemic cardiomyopathy      gabapentin (NEURONTIN) 300 MG capsule Take 1-3 capsules by mouth once nightly at bedtime      !! insulin aspart (NOVOLOG FLEXPEN) 100 UNIT/ML pen Inject Subcutaneous 3 times daily (with meals) Sliding Scale  Do not give sliding scale insulin if Pre-Meal BG less than 140.   For Pre-Meal:   - 200 give 2 units.    - 250 give 4 units.    - 300 give 6 units.    - 350 give 8 units.    - 400 give 10 units.      !! insulin aspart (NOVOLOG PEN) 100 UNIT/ML pen Inject 12 Units Subcutaneous 2 times daily (with meals) With breakfast and lunch    Associated Diagnoses: Type 2 diabetes mellitus with diabetic peripheral angiopathy without gangrene, with long-term current use of insulin (H)      !! insulin aspart (NOVOLOG PEN) 100 UNIT/ML pen Inject 14 Units Subcutaneous daily (with dinner)    Associated Diagnoses: Type 2 diabetes mellitus with diabetic peripheral angiopathy without gangrene, with long-term current use of insulin (H)      insulin glargine (LANTUS PEN) 100 UNIT/ML pen Inject 44 units subcutaneously once every morning    Comments: If Lantus is not covered by insurance, may substitute Basaglar at same dose and frequency.        isosorbide mononitrate (IMDUR) 30 MG 24 hr tablet Take 1 tablet (30 mg) by mouth daily  Qty: 90 tablet, Refills: 1    Associated Diagnoses: Ischemic cardiomyopathy      levothyroxine (SYNTHROID, LEVOTHROID) 137 MCG tablet Take 1 tablet by mouth once every evening  Qty: 90 tablet, Refills: 3      lisinopril (ZESTRIL) 5 MG tablet Take 1  tablet (5 mg) by mouth daily  Qty: 30 tablet, Refills: 11    Associated Diagnoses: Ischemic cardiomyopathy      pantoprazole (PROTONIX) 40 MG EC tablet TAKE 1 TABLET BY MOUTH EVERY MORNING BEFORE BREAKFAST  Qty: 90 tablet, Refills: 1    Associated Diagnoses: Gastroesophageal reflux disease      potassium chloride ER (KLOR-CON M) 20 MEQ CR tablet Take 1 tablet (20 mEq) by mouth daily  Qty: 30 tablet, Refills: 11    Associated Diagnoses: Ischemic cardiomyopathy      silver sulfADIAZINE (SILVADENE) 1 % external cream Apply topically daily  Qty: 25 g, Refills: 1    Associated Diagnoses: Diabetic polyneuropathy associated with type 2 diabetes mellitus (H); Diabetic ulcer of other part of left foot associated with type 2 diabetes mellitus, with fat layer exposed (H)      Continuous Blood Gluc  (FREESTYLE CLARITA 2 READER SYSTM) DRAGAN 1 Device daily  Qty: 1 each, Refills: 3    Associated Diagnoses: Type 2 diabetes mellitus with diabetic peripheral angiopathy without gangrene, with long-term current use of insulin (H)      Continuous Blood Gluc Sensor (FREESTYLE CLARITA 2 SENSOR SYSTM) MISC 1 Units 4 times daily (before meals and nightly)  Qty: 1 each, Refills: 3    Associated Diagnoses: Type 2 diabetes mellitus with diabetic peripheral angiopathy without gangrene, with long-term current use of insulin (H)      ferrous sulfate (FEROSUL) 325 (65 Fe) MG tablet Take 1 tablet (325 mg) by mouth daily (with breakfast)  Qty: 30 tablet, Refills: 3    Associated Diagnoses: Iron deficiency anemia, unspecified iron deficiency anemia type      insulin pen needle (31G X 6 MM) 31G X 6 MM miscellaneous Use  as directed.  Qty: 100 each, Refills: 11    Associated Diagnoses: Type 2 diabetes mellitus with hyperosmolarity without coma, with long-term current use of insulin (H)      nitroGLYcerin (NITROSTAT) 0.4 MG sublingual tablet For chest pain place 1 tablet under the tongue every 5 minutes for 3 doses. If symptoms persist 5 minutes  after 1st dose call 911.  Qty: 15 tablet, Refills: 3    Associated Diagnoses: Coronary artery disease involving native coronary artery of native heart with angina pectoris (H)      Vitamin D, Cholecalciferol, 25 MCG (1000 UT) TABS Take 1 tablet by mouth once daily       !! - Potential duplicate medications found. Please discuss with provider.      STOP taking these medications       cephALEXin (KEFLEX) 500 MG capsule Comments:   Reason for Stopping:         doxycycline hyclate (VIBRAMYCIN) 100 MG capsule Comments:   Reason for Stopping:         HYDROcodone-acetaminophen (NORCO) 5-325 MG tablet Comments:   Reason for Stopping:             Allergies   Allergies   Allergen Reactions     No Known Allergies

## 2021-05-10 NOTE — PLAN OF CARE
7PM-7AM RN     Patient vital signs are at baseline: No. BP slightly elevated.   Patient able to ambulate as they were prior to admission or with assist devices provided by therapies during their stay:  Yes. Independent.   Patient MUST void prior to discharge: Yes  Patient able to tolerate oral intake: Yes.   Pain has adequate pain control using Oral analgesics: Yes.     Up independently. Pain managed with PRN oxy 10. NPO since midnight for surgery this AM. Dressing to foot remains clean, dry, and intact.     
7PM-7AM RN     Patient vital signs are at baseline: Yes  Patient able to ambulate as they were prior to admission or with assist devices provided by therapies during their stay: Yes   Patient MUST void prior to discharge:  Yes  Patient able to tolerate oral intake: Yes  Pain has adequate pain control using Oral analgesics:  Denies pain.     Up with SBA using boot and cane. Denies any pain to surgical site. Dressing has small amount of dried drainage. CMS at baseline (n/t). Hopes to go home today.    
A&O x 4. Oxycodone and tylenol for pain- pt trying to avoid narcotics. Up with SBA, WBAT with boot on. Dressing changed by podiatry- dressing CDI. BG monitoring. IV SL between ABX.   Possible discharge tomorrow   
A&O x 4. Oxycodone for pain. Dressing to RLE reinforced due to drainage. BG monitoring. IV SL between ABX.   Boot in room from orthotics   
AM Shift  Patient vital signs are at baseline: Yes  Patient able to ambulate as they were prior to admission or with assist devices provided by therapies during their stay:  Yes. Up ad nirali. Ambulates with L boot and cane.  Patient MUST void prior to discharge:  Yes  Patient able to tolerate oral intake:  Yes  Pain has adequate pain control using Oral analgesics:  Yes. Denies pain.     A/Ox4. Drsg to L foot changed by podiatry @ 1430. Gave IV Unasyn & vancomycin. Baseline N/T to hands and feet. Blood sugars monitored. Discharged home with filled prescription for oxycodone & prescriptions for PO Augmentin & senna. Escorted out in w/c, picked up by wife. Tele removed. All valuables & belongings sent with.   
Direct admit from Podiatry office.   A&O x 4. Up independent- boot on R foot. Denies need for pain medication at this time. NPO at [place], BG monitoring AC/HS.     Plan for surgery tomorrow morning at 1030 with Dr. Root. Per Dr. Mckeon do not change or remove dressing as it was just done in the office by MD.   COVID swab done  
Pt A&Ox4. VSS. A1-boot for L foot does not currently fit. On vanco and unasyn. Dressing to L foot with some shadowing on bottom of foot. Voiding adequately. Denies pain. Mod cho diet with glucose monitoring. Podiatry following. Will continue to monitor.   
Pt A/O, afebrile. Pain managed with Oxycodone. Ortho boot does not fit. Non WB standing at bedside. Voiding adequately. Foot dressing has shadowing. ,od carb diet. Blood sugar monitoring. Podiatry following   
no

## 2021-05-10 NOTE — PROGRESS NOTES
WOC Nurse Inpatient Wound Assessment   Reason for consultation: Evaluate and treat left foot    Assessment  Patient s/p amputation of the left third toe at the level of the proximal interphalangeal joint and debridement of plantar left foot on 5/8/21.  Podiatry is managing site.  Will sign off, re-consult if podiatry requests [facility] involvement.      Elieser Sanchez RN CWOCN

## 2021-05-10 NOTE — PROGRESS NOTES
Podiatry / Foot and Ankle Surgery Progress Note    May 10, 2021    Subject: Patient was seen at bedside.  No pain to foot but has baseline neuropathy.     Objective:  Vitals: BP (!) 148/84 (BP Location: Left arm)   Pulse 74   Temp 98.3  F (36.8  C) (Temporal)   Resp 16   Wt 119.3 kg (263 lb)   SpO2 97%   BMI 32.01 kg/m    BMI= Body mass index is 32.01 kg/m .     WBC   Date Value Ref Range Status   05/09/2021 5.9 4.0 - 11.0 10e9/L Final     A1C: 8.1 (4/2021)    General:  Patient is alert and orientated.  NAD.    Vascular:  DP and PT pulses are palpable.  No varicosities noted  CFT's < 3secs.  Skin temp is normal.    Neuro:  Light and gross touch sensation absent to feet.     Derm:  Dressing is c/d/i. Sutures intact. Bloody strikethrough to Kerlix roll. Sutures to left 3rd toe intact. No redness, dehiscence or signs on infection.   Full thickness ulceration to plantar left 1st metatarsal head measures 1.5cm x 1.5cm x 0.2cm. base of wound is granular. Heavy clear serous drainage noted.     Musculoskeletal:  Right 2nd and 3rd toe amputations. Multiple right lesser toe amputations.     Assessment: 70 yr old diabetic male s/p right 3rd toe amputation.     Plan:    - POD #2  -Dressing changed at bedside.   -Keep foot and dressing dry.   -all bone infection removed. No further surgery planned.   -minimal weight bearing in post op shoe.   -Okay to be discharged from podiatry standpoint. Recs placed.   -Follow up with Dr. Root in 1 week from discharge from hospital.   -Podiatry will sign off. Please call with questions or concerns.         Татьяна Mckeon DPM, Podiatry/Foot and Ankle Surgery  9:50 AM

## 2021-05-10 NOTE — PHARMACY-VANCOMYCIN DOSING SERVICE
Pharmacy Vancomycin Note  Date of Service May 10, 2021  Patient's  1950   70 year old, male    Indication: Osteomyelitis  Day of Therapy: 4  Current vancomycin regimen:  2,000 mg IV q12h  Current vancomycin monitoring method: Trough (Method 1 = dosing nomogram)  Current vancomycin therapeutic monitoring goal: 15-20 mg/L    Current estimated CrCl = Estimated Creatinine Clearance: 83.6 mL/min (based on SCr of 1.16 mg/dL).    Creatinine for last 3 days  2021:  6:41 PM Creatinine 1.12 mg/dL  2021:  7:30 AM Creatinine 1.09 mg/dL  2021:  7:12 AM Creatinine 1.14 mg/dL  5/10/2021:  8:06 AM Creatinine 1.16 mg/dL    Recent Vancomycin Levels (past 3 days)  2021:  5:52 PM Vancomycin Level 29.6 mg/L  5/10/2021:  8:06 AM Vancomycin Level 18.7 mg/L    Vancomycin IV Administrations (past 72 hours)                   vancomycin (VANCOCIN) 2,000 mg in sodium chloride 0.9 % 500 mL intermittent infusion (mg) 2,000 mg New Bag 21 0645     2,000 mg New Bag 21 1828     2,000 mg New Bag  0650     2,000 mg New Bag 21 1850                Nephrotoxins and other renal medications (From now, onward)    Start     Dose/Rate Route Frequency Ordered Stop    05/10/21 1100  vancomycin (VANCOCIN) 1000 mg in dextrose 5% 200 mL PREMIX      1,000 mg  200 mL/hr over 1 Hours Intravenous EVERY 12 HOURS 05/10/21 1038      21 1530  lisinopril (ZESTRIL) tablet 2.5 mg      2.5 mg Oral DAILY 21 1504      21 1600  ampicillin-sulbactam (UNASYN) 3 g vial to attach to  mL bag      3 g  over 15-30 Minutes Intravenous EVERY 6 HOURS 21 1503      21 0800  furosemide (LASIX) tablet 40 mg      40 mg Oral 2 TIMES DAILY. 21 2317               Contrast Orders - past 72 hours (72h ago, onward)    None          Interpretation of levels and current regimen:  Vancomycin level is reflective of a now therapeutic level after waiting 24 hours after last dose    Has serum creatinine changed greater than  50% in last 72 hours: No    Urine output:  unable to determine    Renal Function: Stable    Plan:  1. Decrease Dose to 1,000 mg q12h  2. Vancomycin monitoring method: Trough (Method 1 = dosing nomogram)  3. Vancomycin therapeutic monitoring goal: 15-20 mg/L  4. Pharmacy will check vancomycin levels as appropriate in 1-3 Days.  5. Serum creatinine levels will be ordered daily for the first week of therapy and at least twice weekly for subsequent weeks.    Chloé Perez, H

## 2021-05-11 LAB
BACTERIA SPEC CULT: ABNORMAL
SPECIMEN SOURCE: ABNORMAL

## 2021-05-12 LAB — COPATH REPORT: NORMAL

## 2021-05-14 NOTE — PROGRESS NOTES
CARDIOLOGY VISIT    REASON FOR VISIT: ischemic cardiomyopathy    SUBJECTIVE:  70-year-old male seen for ischemic cardiomyopathy and A. fib.  He has diabetes type 2, hypothyroid, hypertension, stroke, untreated sleep apnea, CKD, history of osteomyelitis with previous lower extremity digit amputation.    He has a history of MI in 2005 with EF of 35 to 40% history of extensive LAD and OM stenting.  July 2019 underwent 3 stents to the RCA. (10 stents total).    Angiogram April 2021 showed 50 to 75% in-stent restenosis of the mid LAD with FFR 0.87, 40% in-stent restenosis of the RCA, 25% OM 3 in-stent restenosis, no intervention performed.  Echo showed EF 40 to 45%, normal RV, no valve disease.    He has been feeling okay the past 1 month.  He has an occasional random episode of chest pain, but nothing with exertion.  He had another toe amputation last week.  He is very limited in his walking because of this.  Weight at home is 254 pounds which is stable.  Blood pressure runs 130s or less.    MEDICATIONS:  Current Outpatient Medications   Medication     amoxicillin-clavulanate (AUGMENTIN) 875-125 MG tablet     apixaban ANTICOAGULANT (ELIQUIS) 5 MG tablet     atorvastatin (LIPITOR) 40 MG tablet     carvedilol (COREG) 25 MG tablet     clopidogrel (PLAVIX) 75 MG tablet     Continuous Blood Gluc  (FREESTYLE CLARITA 2 READER SYSTM) DRAGAN     Continuous Blood Gluc Sensor (FREESTYLE CLARITA 2 SENSOR SYSTM) MISC     ferrous sulfate (FEROSUL) 325 (65 Fe) MG tablet     furosemide (LASIX) 40 MG tablet     gabapentin (NEURONTIN) 300 MG capsule     insulin aspart (NOVOLOG FLEXPEN) 100 UNIT/ML pen     insulin aspart (NOVOLOG PEN) 100 UNIT/ML pen     insulin aspart (NOVOLOG PEN) 100 UNIT/ML pen     insulin glargine (LANTUS PEN) 100 UNIT/ML pen     insulin pen needle (31G X 6 MM) 31G X 6 MM miscellaneous     isosorbide mononitrate (IMDUR) 30 MG 24 hr tablet     levothyroxine (SYNTHROID, LEVOTHROID) 137 MCG tablet     lisinopril  "(ZESTRIL) 5 MG tablet     nitroGLYcerin (NITROSTAT) 0.4 MG sublingual tablet     oxyCODONE (ROXICODONE) 5 MG tablet     pantoprazole (PROTONIX) 40 MG EC tablet     potassium chloride ER (KLOR-CON M) 20 MEQ CR tablet     senna-docusate (SENOKOT-S/PERICOLACE) 8.6-50 MG tablet     silver sulfADIAZINE (SILVADENE) 1 % external cream     Vitamin D, Cholecalciferol, 25 MCG (1000 UT) TABS     No current facility-administered medications for this visit.        ALLERGIES:  Allergies   Allergen Reactions     No Known Allergies        REVIEW OF SYSTEMS:  Constitutional:  No weight loss, fever, chills, weakness or fatigue.  HEENT:  Eyes:  No visual loss, blurred vision, double vision or yellow sclerae. No hearing loss, sneezing, congestion, runny nose or sore throat.  Skin:  No rash or itching.  Cardiovascular: per HPI  Respiratory: per HPI  GI:  No anorexia, nausea, vomiting or diarrhea. No abdominal pain or blood.  :  No dysurea, hematuria  Neurologic:  No headache, dizziness, syncope, paralysis, ataxia, numbness or tingling in the extremities. No change in bowel or bladder control.  Musculoskeletal:  No muscle, back pain, joint pain or stiffness.  Hematologic:  No anemia, bleeding or bruising.  Lymphatics:  No enlarged nodes. No history of splenectomy.  Psychiatric:  No history of depression or anxiety.  Endocrine:  No reports of sweating, cold or heat intolerance. No polyuria or polydipsia.  Allergies:  No history of asthma, hives, eczema or rhinitis.    PHYSICAL EXAM:  /69   Pulse 76   Ht 1.93 m (6' 4\")   Wt 117.5 kg (259 lb)   BMI 31.53 kg/m      Constitutional: awake, alert, no distress  Eyes: PERRL, sclera nonicteric  ENT: trachea midline  Respiratory: Lungs clear  Cardiovascular: Regular rate and rhythm, no murmurs  GI: nondistended, nontender, bowel sounds present  Lymph/Hematologic: no lymphadenopathy  Skin: dry, no rash  Musculoskeletal: good muscle tone, strength 5/5 in upper and lower " extremities  Neurologic: no focal deficits  Neuropsychiatric: appropriate affact    DATA:  Lab: May 2021: Potassium 3.8, creatinine 1.1  May 17, 2021: Potassium 4.1, creatinine 1.3  Recent Labs   Lab Test 04/08/21  0426 08/13/20  1028 02/02/15  1016 02/02/15  1016 11/14/13  0710   CHOL 79 107   < > 99 103   HDL 36* 45   < > 43 30*   LDL 33 53   < > 42 57   TRIG 52 44   < > 68 82   CHOLHDLRATIO  --   --   --  2.3 3.5    < > = values in this interval not displayed.     ASSESSMENT:  70-year-old male seen for ischemic cardiomyopathy.  He has cardiac issues are stable.  He has no anginal symptoms.  He had no obstructive lesions on his angiogram 1 month ago.  Furosemide will be cut back a little, there is a very slight bump in his creatinine.    RECOMMENDATIONS:  1.  Ischemic cardiomyopathy  -Decrease furosemide to 40 mg twice daily, if any fluid weight gain then go back to 80 mg in the morning and 40 mg in the afternoon  -Continue current medications  -On clopidogrel indefinitely because of his multiple stents    Follow-up in 2 months with FLORA.    Livan King MD  Cardiology - [facility]  Pager:  [phone]  Text Page  May 17, 2021

## 2021-05-16 LAB
BACTERIA SPEC CULT: ABNORMAL
Lab: ABNORMAL
SPECIMEN SOURCE: ABNORMAL

## 2021-05-17 ENCOUNTER — OFFICE VISIT (OUTPATIENT)
Dept: CARDIOLOGY | Facility: CLINIC | Age: 71
End: 2021-05-17
Attending: NURSE PRACTITIONER
Payer: COMMERCIAL

## 2021-05-17 VITALS
SYSTOLIC BLOOD PRESSURE: 103 MMHG | WEIGHT: 259 LBS | HEART RATE: 76 BPM | HEIGHT: 76 IN | BODY MASS INDEX: 31.54 KG/M2 | DIASTOLIC BLOOD PRESSURE: 69 MMHG

## 2021-05-17 DIAGNOSIS — I48.20 CHRONIC ATRIAL FIBRILLATION (H): ICD-10-CM

## 2021-05-17 DIAGNOSIS — E11.51 TYPE 2 DIABETES MELLITUS WITH DIABETIC PERIPHERAL ANGIOPATHY WITHOUT GANGRENE, WITH LONG-TERM CURRENT USE OF INSULIN (H): ICD-10-CM

## 2021-05-17 DIAGNOSIS — R19.5 DARK STOOLS: ICD-10-CM

## 2021-05-17 DIAGNOSIS — I63.432 CEREBROVASCULAR ACCIDENT (CVA) DUE TO EMBOLISM OF LEFT POSTERIOR CEREBRAL ARTERY (H): ICD-10-CM

## 2021-05-17 DIAGNOSIS — Z79.4 TYPE 2 DIABETES MELLITUS WITH DIABETIC PERIPHERAL ANGIOPATHY WITHOUT GANGRENE, WITH LONG-TERM CURRENT USE OF INSULIN (H): ICD-10-CM

## 2021-05-17 DIAGNOSIS — I25.10 CORONARY ARTERY DISEASE INVOLVING NATIVE CORONARY ARTERY OF NATIVE HEART WITHOUT ANGINA PECTORIS: ICD-10-CM

## 2021-05-17 DIAGNOSIS — I25.5 ISCHEMIC CARDIOMYOPATHY: ICD-10-CM

## 2021-05-17 DIAGNOSIS — I10 ESSENTIAL HYPERTENSION: ICD-10-CM

## 2021-05-17 DIAGNOSIS — I50.22 CHRONIC SYSTOLIC CONGESTIVE HEART FAILURE (H): ICD-10-CM

## 2021-05-17 LAB
ANION GAP SERPL CALCULATED.3IONS-SCNC: 6 MMOL/L (ref 3–14)
BUN SERPL-MCNC: 21 MG/DL (ref 7–30)
CALCIUM SERPL-MCNC: 8.9 MG/DL (ref 8.5–10.1)
CHLORIDE SERPL-SCNC: 102 MMOL/L (ref 94–109)
CO2 SERPL-SCNC: 28 MMOL/L (ref 20–32)
CREAT SERPL-MCNC: 1.3 MG/DL (ref 0.66–1.25)
GFR SERPL CREATININE-BSD FRML MDRD: 55 ML/MIN/{1.73_M2}
GLUCOSE SERPL-MCNC: 272 MG/DL (ref 70–99)
POTASSIUM SERPL-SCNC: 4.1 MMOL/L (ref 3.4–5.3)
SODIUM SERPL-SCNC: 136 MMOL/L (ref 133–144)

## 2021-05-17 PROCEDURE — 80048 BASIC METABOLIC PNL TOTAL CA: CPT | Performed by: INTERNAL MEDICINE

## 2021-05-17 PROCEDURE — 99214 OFFICE O/P EST MOD 30 MIN: CPT | Performed by: INTERNAL MEDICINE

## 2021-05-17 PROCEDURE — 36415 COLL VENOUS BLD VENIPUNCTURE: CPT | Performed by: INTERNAL MEDICINE

## 2021-05-17 RX ORDER — LISINOPRIL 5 MG/1
5 TABLET ORAL DAILY
Qty: 90 TABLET | Refills: 3 | Status: SHIPPED | OUTPATIENT
Start: 2021-05-17 | End: 2021-08-10

## 2021-05-17 RX ORDER — POTASSIUM CHLORIDE 1500 MG/1
20 TABLET, EXTENDED RELEASE ORAL DAILY
Qty: 90 TABLET | Refills: 3 | Status: SHIPPED | OUTPATIENT
Start: 2021-05-17 | End: 2021-12-27

## 2021-05-17 RX ORDER — FUROSEMIDE 40 MG
TABLET ORAL
Qty: 90 TABLET | Refills: 4 | Status: SHIPPED | OUTPATIENT
Start: 2021-05-17 | End: 2021-06-28

## 2021-05-17 ASSESSMENT — MIFFLIN-ST. JEOR: SCORE: 2036.32

## 2021-05-17 NOTE — PATIENT INSTRUCTIONS
1. Change furosemide (Lasix) to 40mg once in the morning and 40mg in the afternoon.    If you gain any fluid weight, then go back to 80mg in the morning and 40mg in the afternoon.

## 2021-05-17 NOTE — LETTER
5/17/2021    Physician No Ref-Primary  No address on file    RE: Jayro Elder       Dear Colleague,    I had the pleasure of seeing Jayro Elder in the [facility].    CARDIOLOGY VISIT    REASON FOR VISIT: ischemic cardiomyopathy    SUBJECTIVE:  70-year-old male seen for ischemic cardiomyopathy and A. fib.  He has diabetes type 2, hypothyroid, hypertension, stroke, untreated sleep apnea, CKD, history of osteomyelitis with previous lower extremity digit amputation.    He has a history of MI in 2005 with EF of 35 to 40% history of extensive LAD and OM stenting.  July 2019 underwent 3 stents to the RCA. (10 stents total).    Angiogram April 2021 showed 50 to 75% in-stent restenosis of the mid LAD with FFR 0.87, 40% in-stent restenosis of the RCA, 25% OM 3 in-stent restenosis, no intervention performed.  Echo showed EF 40 to 45%, normal RV, no valve disease.    He has been feeling okay the past 1 month.  He has an occasional random episode of chest pain, but nothing with exertion.  He had another toe amputation last week.  He is very limited in his walking because of this.  Weight at home is 254 pounds which is stable.  Blood pressure runs 130s or less.    MEDICATIONS:  Current Outpatient Medications   Medication     amoxicillin-clavulanate (AUGMENTIN) 875-125 MG tablet     apixaban ANTICOAGULANT (ELIQUIS) 5 MG tablet     atorvastatin (LIPITOR) 40 MG tablet     carvedilol (COREG) 25 MG tablet     clopidogrel (PLAVIX) 75 MG tablet     Continuous Blood Gluc  (FREESTYLE CLARITA 2 READER SYSTM) DRAGAN     Continuous Blood Gluc Sensor (FREESTYLE CLARITA 2 SENSOR SYSTM) MISC     ferrous sulfate (FEROSUL) 325 (65 Fe) MG tablet     furosemide (LASIX) 40 MG tablet     gabapentin (NEURONTIN) 300 MG capsule     insulin aspart (NOVOLOG FLEXPEN) 100 UNIT/ML pen     insulin aspart (NOVOLOG PEN) 100 UNIT/ML pen     insulin aspart (NOVOLOG PEN) 100 UNIT/ML pen      "insulin glargine (LANTUS PEN) 100 UNIT/ML pen     insulin pen needle (31G X 6 MM) 31G X 6 MM miscellaneous     isosorbide mononitrate (IMDUR) 30 MG 24 hr tablet     levothyroxine (SYNTHROID, LEVOTHROID) 137 MCG tablet     lisinopril (ZESTRIL) 5 MG tablet     nitroGLYcerin (NITROSTAT) 0.4 MG sublingual tablet     oxyCODONE (ROXICODONE) 5 MG tablet     pantoprazole (PROTONIX) 40 MG EC tablet     potassium chloride ER (KLOR-CON M) 20 MEQ CR tablet     senna-docusate (SENOKOT-S/PERICOLACE) 8.6-50 MG tablet     silver sulfADIAZINE (SILVADENE) 1 % external cream     Vitamin D, Cholecalciferol, 25 MCG (1000 UT) TABS     No current facility-administered medications for this visit.        ALLERGIES:  Allergies   Allergen Reactions     No Known Allergies        REVIEW OF SYSTEMS:  Constitutional:  No weight loss, fever, chills, weakness or fatigue.  HEENT:  Eyes:  No visual loss, blurred vision, double vision or yellow sclerae. No hearing loss, sneezing, congestion, runny nose or sore throat.  Skin:  No rash or itching.  Cardiovascular: per HPI  Respiratory: per HPI  GI:  No anorexia, nausea, vomiting or diarrhea. No abdominal pain or blood.  :  No dysurea, hematuria  Neurologic:  No headache, dizziness, syncope, paralysis, ataxia, numbness or tingling in the extremities. No change in bowel or bladder control.  Musculoskeletal:  No muscle, back pain, joint pain or stiffness.  Hematologic:  No anemia, bleeding or bruising.  Lymphatics:  No enlarged nodes. No history of splenectomy.  Psychiatric:  No history of depression or anxiety.  Endocrine:  No reports of sweating, cold or heat intolerance. No polyuria or polydipsia.  Allergies:  No history of asthma, hives, eczema or rhinitis.    PHYSICAL EXAM:  /69   Pulse 76   Ht 1.93 m (6' 4\")   Wt 117.5 kg (259 lb)   BMI 31.53 kg/m      Constitutional: awake, alert, no distress  Eyes: PERRL, sclera nonicteric  ENT: trachea midline  Respiratory: Lungs clear  Cardiovascular: " Regular rate and rhythm, no murmurs  GI: nondistended, nontender, bowel sounds present  Lymph/Hematologic: no lymphadenopathy  Skin: dry, no rash  Musculoskeletal: good muscle tone, strength 5/5 in upper and lower extremities  Neurologic: no focal deficits  Neuropsychiatric: appropriate affact    DATA:  Lab: May 2021: Potassium 3.8, creatinine 1.1  May 17, 2021: Potassium 4.1, creatinine 1.3  Recent Labs   Lab Test 04/08/21  0426 08/13/20  1028 02/02/15  1016 02/02/15  1016 11/14/13  0710   CHOL 79 107   < > 99 103   HDL 36* 45   < > 43 30*   LDL 33 53   < > 42 57   TRIG 52 44   < > 68 82   CHOLHDLRATIO  --   --   --  2.3 3.5    < > = values in this interval not displayed.     ASSESSMENT:  70-year-old male seen for ischemic cardiomyopathy.  He has cardiac issues are stable.  He has no anginal symptoms.  He had no obstructive lesions on his angiogram 1 month ago.  Furosemide will be cut back a little, there is a very slight bump in his creatinine.    RECOMMENDATIONS:  1.  Ischemic cardiomyopathy  -Decrease furosemide to 40 mg twice daily, if any fluid weight gain then go back to 80 mg in the morning and 40 mg in the afternoon  -Continue current medications  -On clopidogrel indefinitely because of his multiple stents    Follow-up in 2 months with FLORA.    Livan King MD  Cardiology - [facility]  Pager:  [phone]  Text Page  May 17, 2021    cc:   Nickie Lopez, APRN CNP  [address]  [address]  [place] [address]

## 2021-05-18 ENCOUNTER — OFFICE VISIT (OUTPATIENT)
Dept: PODIATRY | Facility: CLINIC | Age: 71
End: 2021-05-18
Payer: COMMERCIAL

## 2021-05-18 VITALS
DIASTOLIC BLOOD PRESSURE: 64 MMHG | WEIGHT: 259 LBS | SYSTOLIC BLOOD PRESSURE: 108 MMHG | BODY MASS INDEX: 31.54 KG/M2 | HEIGHT: 76 IN

## 2021-05-18 DIAGNOSIS — E11.42 DIABETIC POLYNEUROPATHY ASSOCIATED WITH TYPE 2 DIABETES MELLITUS (H): Primary | ICD-10-CM

## 2021-05-18 DIAGNOSIS — Z98.890 POST-OPERATIVE STATE: ICD-10-CM

## 2021-05-18 DIAGNOSIS — Z87.2 HEALED FOOT ULCER: ICD-10-CM

## 2021-05-18 PROCEDURE — 99212 OFFICE O/P EST SF 10 MIN: CPT | Performed by: PODIATRIST

## 2021-05-18 ASSESSMENT — MIFFLIN-ST. JEOR: SCORE: 2036.32

## 2021-05-18 NOTE — LETTER
"    5/18/2021         RE: Jayro Elder  [address] [place] [address]        Dear Colleague,    Thank you for referring your patient, Jayro Elder, to the [facility]. Please see a copy of my visit note below.    Podiatry / Foot and Ankle Surgery Progress Note    May 18, 2021    Subject: Patient was seen for follow up on 1 week status post left third toe amputation with Dr. Root.  No pain but has baseline neuropathy.  Denies fever, nausea, vomiting.  No concerns today.    Objective:  Vitals: /64   Ht 1.93 m (6' 4\")   Wt 117.5 kg (259 lb)   BMI 31.53 kg/m    BMI= Body mass index is 31.53 kg/m .    A1C: 8.1 (4/2021)    General:  Patient is alert and orientated.  NAD.    Vascular:  DP and PT pulses are palpable.  No edema or varicosities noted.  CFT's < 3secs.  Skin temp is normal.    Neuro:  Light and gross touch sensation intact to digits, dorsum, and plantar aspects of the feet.    Derm: Dressing is clean dry and intact.  Sutures are intact to the left third toe amputation area.  No redness, dehiscence or signs of infection noted.  Ulceration to the plantar aspect of the right first metatarsal head appears healed.  No open lesions or signs of infection noted.  Dry stable eschar to the lateral aspect of the left fifth toe.  No open lesions or signs of infection.    Musculoskeletal: Partial left second and third toe amputations.    Assessment:    Diabetic polyneuropathy associated with type 2 diabetes mellitus (H)  Healed foot ulcer  Post-operative state    Medical Decision Making/Plan: At this time the dressing was changed.  We will continue to have him keep his foot and dressing dry.  We will have him follow-up in 2 weeks with Dr. Gómez for reassessment and possible suture removal.  He will continue minimal weightbearing in his postop shoe.  All questions were answered to patient satisfaction he will call further questions or concerns.      Patient " Risk Factor:  Patient is a high risk factor for infection.     Татьяна Mckeon DPM, Podiatry/Foot and Ankle Surgery    Recommended to Jayro Elder to follow up with Primary Care provider regarding elevated blood pressure.        Again, thank you for allowing me to participate in the care of your patient.        Sincerely,        Татьяна Mckeon DPM, Podiatry/Foot and Ankle Surgery

## 2021-05-18 NOTE — PROGRESS NOTES
"Podiatry / Foot and Ankle Surgery Progress Note    May 18, 2021    Subject: Patient was seen for follow up on 1 week status post left third toe amputation with Dr. Root.  No pain but has baseline neuropathy.  Denies fever, nausea, vomiting.  No concerns today.    Objective:  Vitals: /64   Ht 1.93 m (6' 4\")   Wt 117.5 kg (259 lb)   BMI 31.53 kg/m    BMI= Body mass index is 31.53 kg/m .    A1C: 8.1 (4/2021)    General:  Patient is alert and orientated.  NAD.    Vascular:  DP and PT pulses are palpable.  No edema or varicosities noted.  CFT's < 3secs.  Skin temp is normal.    Neuro:  Light and gross touch sensation intact to digits, dorsum, and plantar aspects of the feet.    Derm: Dressing is clean dry and intact.  Sutures are intact to the left third toe amputation area.  No redness, dehiscence or signs of infection noted.  Ulceration to the plantar aspect of the right first metatarsal head appears healed.  No open lesions or signs of infection noted.  Dry stable eschar to the lateral aspect of the left fifth toe.  No open lesions or signs of infection.    Musculoskeletal: Partial left second and third toe amputations.    Assessment:    Diabetic polyneuropathy associated with type 2 diabetes mellitus (H)  Healed foot ulcer  Post-operative state    Medical Decision Making/Plan: At this time the dressing was changed.  We will continue to have him keep his foot and dressing dry.  We will have him follow-up in 2 weeks with Dr. Gómez for reassessment and possible suture removal.  He will continue minimal weightbearing in his postop shoe.  All questions were answered to patient satisfaction he will call further questions or concerns.      Patient Risk Factor:  Patient is a high risk factor for infection.     Татьяна Mckeon DPM, Podiatry/Foot and Ankle Surgery    Recommended to Jayro Elder to follow up with Primary Care provider regarding elevated blood pressure.    "

## 2021-05-18 NOTE — PATIENT INSTRUCTIONS
[facility] for choosing [facility]!    DR. VÁSQUEZ'S CLINIC LOCATIONS     [facility] SURGERY: [phone]   [address] [place] [address] APPOINTMENTS: [phone]   [place] [address] BILLING QUESTIONS: [phone]  [phone] RADIOLOGY: [phone]       PETE    [facility] [id number]    [place] [address]    [phone]  [phone]      Follow up: 2 WEEKS

## 2021-06-02 ENCOUNTER — OFFICE VISIT (OUTPATIENT)
Dept: PODIATRY | Facility: CLINIC | Age: 71
End: 2021-06-02
Payer: COMMERCIAL

## 2021-06-02 VITALS
WEIGHT: 263 LBS | SYSTOLIC BLOOD PRESSURE: 112 MMHG | DIASTOLIC BLOOD PRESSURE: 64 MMHG | BODY MASS INDEX: 32.03 KG/M2 | HEIGHT: 76 IN

## 2021-06-02 DIAGNOSIS — E11.42 DIABETIC POLYNEUROPATHY ASSOCIATED WITH TYPE 2 DIABETES MELLITUS (H): ICD-10-CM

## 2021-06-02 DIAGNOSIS — Z09 SURGERY FOLLOW-UP EXAMINATION: Primary | ICD-10-CM

## 2021-06-02 DIAGNOSIS — Z89.422 STATUS POST AMPUTATION OF LESSER TOE OF LEFT FOOT (H): ICD-10-CM

## 2021-06-02 DIAGNOSIS — E11.00 TYPE 2 DIABETES MELLITUS WITH HYPEROSMOLARITY WITHOUT COMA, WITH LONG-TERM CURRENT USE OF INSULIN (H): ICD-10-CM

## 2021-06-02 DIAGNOSIS — Z79.4 TYPE 2 DIABETES MELLITUS WITH HYPEROSMOLARITY WITHOUT COMA, WITH LONG-TERM CURRENT USE OF INSULIN (H): ICD-10-CM

## 2021-06-02 PROCEDURE — 99212 OFFICE O/P EST SF 10 MIN: CPT | Performed by: PODIATRIST

## 2021-06-02 ASSESSMENT — MIFFLIN-ST. JEOR: SCORE: 2054.46

## 2021-06-02 NOTE — LETTER
6/2/2021         RE: Jayro Elder  [address] [place] [address]        Dear Colleague,    Thank you for referring your patient, Jayro Elder, to the [facility]. Please see a copy of my visit note below.    S: Jayro Elder  presents 3+ weeks post op.      POSTOPERATIVE DIAGNOSES:  1.  Ulceration, cellulitis, osteomyelitis and hammertoe deformity, left third toe.  2.  Chronic ulceration, plantar left foot.     PROCEDURES:  1.  Amputation of the left third toe at the level of the proximal interphalangeal joint.  2.  Excisional debridement of less than 20 square cm down to the level of the deeper skin, plantar left foot.      INDICATIONS FOR PROCEDURE:  Jayro Elder is a 70-year-old male with uncontrolled type 2 diabetes, peripheral neuropathy, history of multiple diabetes related wounds, debridements, and amputations, heart failure with an ejection fraction of 40%, chronic kidney disease stage III, paroxysmal atrial fibrillation on Eliquis, coronary artery disease, hypertension, hyperlipidemia, hypothyroidism, gastroesophageal reflux disease, and iron deficiency anemia, who was admitted from Dr. Mckeon's office yesterday due to osteomyelitis of the left third toe.  An MRI was completed showing osteomyelitis of the distal phalanx.  There was a deep wound in this region.  The toe was erythematous and edematous.  Amputation was discussed by Dr. Mckeon and the patient was agreeable to the procedure and admission.  Prior to surgery this morning, I reviewed our primary goal: removing the toe, treating and curing the infection.  I also explained that primary closure is a goal.  An ulceration was noted on the plantar aspect of the left first metatarsal head region.  I suggested excisional debridement.  He was agreeable to this.  No guarantees were given.    No complaints or new concerns.   Wearing a DH2 healing shoe    O:   Vascular:  Pedal pulses are palpable.   No edema.    Neuro: Light touch sensation is intact distal to the incision.    Derm: The incision is well coapted.  Sutures are intact.  No carrie-incisional erythema.   No ulceration today. There are some patches of dry, hyperkeratotic skin.     ASSESSMENT:  1) s/p above noted surgery.  No clinical signs of infection.  Pain is controlled.  Encounter Diagnoses   Name Primary?     Surgery follow-up examination Yes     Status post amputation of lesser toe of left foot (H)      Type 2 diabetes mellitus with hyperosmolarity without coma, with long-term current use of insulin (H)      Diabetic polyneuropathy associated with type 2 diabetes mellitus (H)      The previous ulcer, sub left first metatarsal head region has healed.     PLAN:  1) Suture Removal    The incision was prepped with povodine iodine solution.  Using a sterile suture scissors and forceps, the sutures were removed w/o difficulty.  Incision cleansed with an alcohol wipe and a light dressing was applied.  Pt advised to keep foot dry for an additional 24 hours, and then washing the foot is okay.  Pt stated understanding.    2) I asked him to inspect both feet daily and notify us if any concerns, I.e. new wounds.    3) follow up for a check in 1 month    Kwasi Root DPM;        Again, thank you for allowing me to participate in the care of your patient.        Sincerely,        Kwasi Root DPM

## 2021-06-02 NOTE — PROGRESS NOTES
S: Jayro Elder  presents 3+ weeks post op.      POSTOPERATIVE DIAGNOSES:  1.  Ulceration, cellulitis, osteomyelitis and hammertoe deformity, left third toe.  2.  Chronic ulceration, plantar left foot.     PROCEDURES:  1.  Amputation of the left third toe at the level of the proximal interphalangeal joint.  2.  Excisional debridement of less than 20 square cm down to the level of the deeper skin, plantar left foot.      INDICATIONS FOR PROCEDURE:  Jayro Elder is a 70-year-old male with uncontrolled type 2 diabetes, peripheral neuropathy, history of multiple diabetes related wounds, debridements, and amputations, heart failure with an ejection fraction of 40%, chronic kidney disease stage III, paroxysmal atrial fibrillation on Eliquis, coronary artery disease, hypertension, hyperlipidemia, hypothyroidism, gastroesophageal reflux disease, and iron deficiency anemia, who was admitted from Dr. Mckeon's office yesterday due to osteomyelitis of the left third toe.  An MRI was completed showing osteomyelitis of the distal phalanx.  There was a deep wound in this region.  The toe was erythematous and edematous.  Amputation was discussed by Dr. Mckeon and the patient was agreeable to the procedure and admission.  Prior to surgery this morning, I reviewed our primary goal: removing the toe, treating and curing the infection.  I also explained that primary closure is a goal.  An ulceration was noted on the plantar aspect of the left first metatarsal head region.  I suggested excisional debridement.  He was agreeable to this.  No guarantees were given.    No complaints or new concerns.   Wearing a DH2 healing shoe    O:   Vascular:  Pedal pulses are palpable.  No edema.    Neuro: Light touch sensation is intact distal to the incision.    Derm: The incision is well coapted.  Sutures are intact.  No carrie-incisional erythema.   No ulceration today. There are some patches of dry, hyperkeratotic skin.     ASSESSMENT:  1) s/p  above noted surgery.  No clinical signs of infection.  Pain is controlled.  Encounter Diagnoses   Name Primary?     Surgery follow-up examination Yes     Status post amputation of lesser toe of left foot (H)      Type 2 diabetes mellitus with hyperosmolarity without coma, with long-term current use of insulin (H)      Diabetic polyneuropathy associated with type 2 diabetes mellitus (H)      The previous ulcer, sub left first metatarsal head region has healed.     PLAN:  1) Suture Removal    The incision was prepped with povodine iodine solution.  Using a sterile suture scissors and forceps, the sutures were removed w/o difficulty.  Incision cleansed with an alcohol wipe and a light dressing was applied.  Pt advised to keep foot dry for an additional 24 hours, and then washing the foot is okay.  Pt stated understanding.    2) I asked him to inspect both feet daily and notify us if any concerns, I.e. new wounds.    3) follow up for a check in 1 month    Kwasi Root DPM;

## 2021-06-22 ENCOUNTER — TELEPHONE (OUTPATIENT)
Dept: FAMILY MEDICINE | Facility: CLINIC | Age: 71
End: 2021-06-22

## 2021-06-22 NOTE — TELEPHONE ENCOUNTER
Pt states he has a few lesions on the top of his foot where the ankle meets.     They are red and bumpy and patchy. 2 at least. One half dollar size, one is quarter size.     Dr Root told pt to see Dermatology. But [facility] cannot get him until September.     For awhile now, they are Leaking and bleeding.  Getting worse.     Tried Neosporin.     He has appt on Thursday with CONCEPCION Huang.     Will route to CONCEPCION Huang as FYI, and pended referral to external partners. ([facility] in [place] is an option).

## 2021-06-23 DIAGNOSIS — G25.81 RESTLESS LEGS SYNDROME: ICD-10-CM

## 2021-06-23 DIAGNOSIS — Z79.4 TYPE 2 DIABETES MELLITUS WITH DIABETIC NEPHROPATHY, WITH LONG-TERM CURRENT USE OF INSULIN (H): ICD-10-CM

## 2021-06-23 DIAGNOSIS — E11.21 TYPE 2 DIABETES MELLITUS WITH DIABETIC NEPHROPATHY, WITH LONG-TERM CURRENT USE OF INSULIN (H): ICD-10-CM

## 2021-06-24 ENCOUNTER — OFFICE VISIT (OUTPATIENT)
Dept: FAMILY MEDICINE | Facility: CLINIC | Age: 71
End: 2021-06-24
Payer: COMMERCIAL

## 2021-06-24 ENCOUNTER — DOCUMENTATION ONLY (OUTPATIENT)
Dept: PEDIATRICS | Facility: CLINIC | Age: 71
End: 2021-06-24

## 2021-06-24 VITALS
RESPIRATION RATE: 16 BRPM | TEMPERATURE: 97.9 F | BODY MASS INDEX: 32.49 KG/M2 | WEIGHT: 266.8 LBS | DIASTOLIC BLOOD PRESSURE: 84 MMHG | HEIGHT: 76 IN | OXYGEN SATURATION: 96 % | SYSTOLIC BLOOD PRESSURE: 130 MMHG | HEART RATE: 72 BPM

## 2021-06-24 DIAGNOSIS — R21 RASH AND NONSPECIFIC SKIN ERUPTION: Primary | ICD-10-CM

## 2021-06-24 DIAGNOSIS — G63 POLYNEUROPATHY ASSOCIATED WITH UNDERLYING DISEASE (H): ICD-10-CM

## 2021-06-24 DIAGNOSIS — N18.31 STAGE 3A CHRONIC KIDNEY DISEASE (H): ICD-10-CM

## 2021-06-24 DIAGNOSIS — E11.42 DIABETIC POLYNEUROPATHY ASSOCIATED WITH TYPE 2 DIABETES MELLITUS (H): ICD-10-CM

## 2021-06-24 PROBLEM — N18.30 CHRONIC KIDNEY DISEASE, STAGE 3 (H): Status: ACTIVE | Noted: 2021-06-24

## 2021-06-24 PROBLEM — L89.893 PRESSURE ULCER OF TOE OF RIGHT FOOT, STAGE 3 (H): Status: RESOLVED | Noted: 2018-06-22 | Resolved: 2021-06-24

## 2021-06-24 PROCEDURE — 99214 OFFICE O/P EST MOD 30 MIN: CPT | Performed by: PHYSICIAN ASSISTANT

## 2021-06-24 RX ORDER — TRIAMCINOLONE ACETONIDE 1 MG/G
CREAM TOPICAL 2 TIMES DAILY
Qty: 30 G | Refills: 0 | Status: SHIPPED | OUTPATIENT
Start: 2021-06-24 | End: 2022-01-01

## 2021-06-24 RX ORDER — CETIRIZINE HYDROCHLORIDE 10 MG/1
10 TABLET ORAL DAILY
Qty: 90 TABLET | Refills: 0 | Status: SHIPPED | OUTPATIENT
Start: 2021-06-24 | End: 2021-09-16

## 2021-06-24 RX ORDER — GABAPENTIN 300 MG/1
CAPSULE ORAL
Qty: 180 CAPSULE | Refills: 1 | Status: SHIPPED | OUTPATIENT
Start: 2021-06-24 | End: 2022-01-01

## 2021-06-24 ASSESSMENT — MIFFLIN-ST. JEOR: SCORE: 2071.7

## 2021-06-24 ASSESSMENT — PAIN SCALES - GENERAL: PAINLEVEL: NO PAIN (0)

## 2021-06-24 NOTE — TELEPHONE ENCOUNTER
gabapentin (NEURONTIN) 300 MG capsule   Last Written Prescription Date:    Last Fill Quantity: ,   # refills:   Last Office Visit: 8/20/20  Future Office visit:    Next 5 appointments (look out 90 days)    Jun 24, 2021 10:00 AM  SHORT with Davion Cordova PA-C  [facility] ([facility] ) [address] [address]  [phone]   Jul 07, 2021 12:45 PM  Return Visit with Kwasi Root DPM  [facility] ([facility] ) [address]  [address]  [facility] [address]  [phone]   Aug 03, 2021 12:30 PM  Return Visit with Gael Christensen NP  [facility] ([facility] ) [address]  [facility] [id number] [phone]           Routing refill request to provider for review/approval because:  Drug not on the [facility], [facility] or [facility] refill protocol or controlled substance  Not on current med-list patient has appt today.     Laya Yo RN on 6/24/2021 at 8:40 AM

## 2021-06-24 NOTE — PROGRESS NOTES
"    Assessment & Plan     Rash and nonspecific skin eruption  Unclear. Certainly there is some lichenification aspect with chornic scratch/itching. No evidence for infection. Question initial dx of dishydrotic eczema with now worsening? I do not feel abx are required today. Discussed home hygiene and will add a steroid. Trial oral antihistamine as well to help with itching. Ultimately recommend Derm  - triamcinolone (KENALOG) 0.1 % external cream; Apply topically 2 times daily  - cetirizine (ZYRTEC) 10 MG tablet; Take 1 tablet (10 mg) by mouth daily  - ADULT DERMATOLOGY REFERRAL; Future    Diabetic polyneuropathy associated with type 2 diabetes mellitus (H)  Polyneuropathy associated with underlying disease (H)  Discussed importance of continued foot checks    Stage 3a chronic kidney disease  Needs continued monitoring with his comorbidities. Monitor gabapentin dosing     BMI:   Estimated body mass index is 32.48 kg/m  as calculated from the following:    Height as of this encounter: 1.93 m (6' 4\").    Weight as of this encounter: 121 kg (266 lb 12.8 oz).         Return in about 13 days (around 7/7/2021) for with Dr. Root.    KASSI Snow [facility] SHEEBA Dial is a 70 year old who presents for the following health issues     HPI           Concern - pt complains of rash  Onset: 2 months redness itching, oozing and bleeding   Description: oozing itching red  Intensity: severe  Progression of Symptoms:  worsening  Accompanying Signs & Symptoms: red bumps  Previous history of similar problem: no  Precipitating factors:        Worsened by: no  Alleviating factors:        Improved by: no  Therapies tried and outcome: tried neosporin aquafor topical cream    Jayro Elder is a 70 year old male who presents today for ongoing foot rash, bilaterally  They have been present for at least 2 months and prior to his recent toe amputation  He has recently had his diuretic reduced " "but denies any excess fluid  There is some clear discharge from some of the wounds, bleeding from others  (on both plavix and eliquis)  Started as small reddened bumps, then blisters and now larger wounds  They are very itchy  He has tried multiple creams but not effective  Podiatry recommended derm but he could not get in.      Review of Systems   Constitutional, HEENT, cardiovascular, pulmonary, gi and gu systems are negative, except as otherwise noted.      Objective    /84   Pulse 72   Temp 97.9  F (36.6  C) (Oral)   Resp 16   Ht 1.93 m (6' 4\")   Wt 121 kg (266 lb 12.8 oz)   SpO2 96%   BMI 32.48 kg/m    Body mass index is 32.48 kg/m .  Physical Exam   GENERAL: healthy, alert and no distress  MS/SKIN: there are small papular bleeding wounds to the anterior right ankle, flexor space. No spreading erythema. Evidence of excoriation. The left ankle (IMAGES BELOW) show two larger grossly circular raised rashes. There are small vescicular lesions overlying. These are not bleeding but weeping a clear fluid. There is excoriation evidence throuhout the leg.   SKIN: see below. These wounds can be compared with a photo from ED visit 5/3/2021                  "

## 2021-06-24 NOTE — PROGRESS NOTES
Garry Ricardo,     I received your [facility] transfer request. The patient would meet criteria for a transition. Has there been a discussion with the patient? If so, is he agreeable to transitioning to the [facility] for care with our [facility] team?    Sree King MD  Internal Medicine  [place], [place]

## 2021-06-25 ENCOUNTER — TRANSFERRED RECORDS (OUTPATIENT)
Dept: HEALTH INFORMATION MANAGEMENT | Facility: CLINIC | Age: 71
End: 2021-06-25

## 2021-06-28 DIAGNOSIS — I25.5 ISCHEMIC CARDIOMYOPATHY: ICD-10-CM

## 2021-06-28 RX ORDER — FUROSEMIDE 40 MG
40 TABLET ORAL 2 TIMES DAILY
Qty: 180 TABLET | Refills: 1 | Status: SHIPPED | OUTPATIENT
Start: 2021-06-28 | End: 2021-08-03

## 2021-06-28 NOTE — TELEPHONE ENCOUNTER
Received refill request for:  Lasix  Last OV was: 21 with Dr. King  Labs/EK21 BMP  F/U scheduled: 8/3/21 with MADY Shukla  New script sent to: [facility]

## 2021-07-19 DIAGNOSIS — I63.432 CEREBROVASCULAR ACCIDENT (CVA) DUE TO EMBOLISM OF LEFT POSTERIOR CEREBRAL ARTERY (H): ICD-10-CM

## 2021-07-19 RX ORDER — ATORVASTATIN CALCIUM 40 MG/1
40 TABLET, FILM COATED ORAL EVERY EVENING
Qty: 90 TABLET | Refills: 3 | Status: SHIPPED | OUTPATIENT
Start: 2021-07-19 | End: 2022-01-01

## 2021-07-20 VITALS
TEMPERATURE: 97.8 F | RESPIRATION RATE: 15 BRPM | HEART RATE: 77 BPM | SYSTOLIC BLOOD PRESSURE: 85 MMHG | WEIGHT: 249.9 LBS | HEIGHT: 74 IN | DIASTOLIC BLOOD PRESSURE: 48 MMHG | BODY MASS INDEX: 32.07 KG/M2

## 2021-07-20 NOTE — PROGRESS NOTES
Progress Notes by Starr Verde CNP at 1/27/2020 12:30 PM     Author: Starr Verde CNP Service: -- Author Type: Nurse Practitioner    Filed: 1/27/2020  4:00 PM Encounter Date: 1/27/2020 Status: Signed    : Starr Verde CNP (Nurse Practitioner)        Zestar Study    Physical Examination  For abnormal findings, please evaluate if the finding is Clinically Significant (by 'CS') or Not Clinically Significant (by 'NCS')  General Appearance Normal  Head and Neck  Normal  Lungs    Normal  Cardiovascular  NCS irregular irregular  Abdomen   Normal  Musculoskeletal/Extremities NCS +1-2 swelling ankles    Lymph Nodes  Normal  Skin    Normal  Neurological   Normal   Tremor absent     If present, evaluate severity on 1-10 scale    Starr Verde CNP

## 2021-07-20 NOTE — PROGRESS NOTES
Progress Notes by Starr Lamb at 1/27/2020 12:30 PM     Author: Starr Lamb Service: -- Author Type: Patient Access    Filed: 1/27/2020  4:00 PM Encounter Date: 1/27/2020 Status: Signed    : Starr Lamb (Patient Access)         Zestar Study Consent Visit    Study description: ECG and PPG Study: Zestar Study      Note time seated: 1254    Jayro Elder a 69 y.o. male , was seen in [facility] today to discuss participation in the Zestar study.   The consent discussion began on 1/17/2020.  Please refer to phone call note from Juany Wright for more details.  Sumaya Fuentes updated note with new medications and medical history since patient talked with Juany on 1/17.     The consent form was reviewed with the patient.     The review of the study included:    Study purpose     Conflict of interest    Device description      Study visits    Risks of participation    Benefits (if any)    Alternatives    Voluntary participation    Confidentiality     Compensation/costs of participation    Study stipends    Injury and legal rights    The subject was provided time to review the consent form and consider participation. his questions were answered to his satisfaction.   The patient has voluntarily agreed to participate in the above noted study.     The consent form version 25 Nov 2019 and HIPAA form version 11 June 2019 was signed 01/27/20 at 1310    The subject was provided with a copy of the consent form and HIPAA. A copy of the signed forms was forwarded to medical records.    No study procedures were done prior to Jayro Elder providing informed consent.     Starr Lamb    Subject Restrictions During Study -Confirmed with Subject prior to any study procedures completed    Restrictions on jewelry, recreational drugs, caffeine, and exercise few days prior and during study.   1. Subjects should not consume excessive amount of caffeine (6 or more 8-oz cups of coffee, or more than 570 mg of caffeine from energy  "drinks, pills or similar substance) during their participation in the study.   2. Subjects should not consume excessive amount of alcohol for the duration of their participation in the study. A typical moderate amount is allowed during stage 3.   3. Subjects should not take any recreational drugs (including, but not limited to methamphetamines, cocaine, opioids, cannabis, LSD) for the duration of their participation in the study.   4. Subjects should not wear underwire bra or jewelry during the in-lab study (to not interfere with electrode placement and ECG data recordings).   5. Subject will not be permitted to have their cell phone or any electronic recording device on or with them during the in-lab test session(s).   6. Subjects under 22 years old will not be permitted to take ECG recordings through the ECG levi on the wrist-worn devices.     For study stage 3 only   1. Subjects should only do high intensity exercise (e.g. sprinting, heavy lifting, etc.) in the morning upon awakening or else not at all   2. Subjects should abstain from swimming during the time of the study   3. Subjects should only shower in the morning upon awakening (or else not at all)   4. Female subjects are strongly suggested to wear non-underwire bras throughout this stage of the study     Study Data collections   Vitals  (TPBP)     Vitals:    01/27/20 1317 01/27/20 1318 01/27/20 1319   BP: 99/47 (!) 85/46 (!) 85/48   Patient Site: Right Arm Right Arm Right Arm   Patient Position: Sitting Sitting Sitting   Cuff Size: Adult Regular Adult Regular Adult Regular   Pulse: 79 (!) 59 77   Resp: 15     Temp:  97.8  F (36.6  C)    TempSrc:  Oral    Weight:   (!) 249 lb 14.4 oz (113.4 kg)   Height:   6' 1.5\" (1.867 m)      VS taken after 5 min rest     MAP 1    67  MAP 2      59   MAP 3             59          Body mass index is 32.52 kg/m .  male  1950  69 y.o.    Time subject placed supine: 1323    Ethnicity   []   or    [x]  Not "  or   Race   []   or    []    []  Black or   []   or Other   [x]  White   Physical Activity Level  per subject report:   []  0- Extremely Inactive [x]  1- Sedentary []  2- Moderately Active []  3- Vigorously Active []  4- Extremely Active  Trained Athlete   [] Yes  [x] No     Chandler's' Skin type   [] Type 1 [] Type 2 [x] Type 3    [] Type 4 [] Type 5 [] Type 6    Subject participated in previous ECG study at [facility]: [] Yes    [x] No    Past Medical History:   Diagnosis Date   ? CAD (coronary artery disease) 06/29/2015   ? Cerebral infarction (H) 2016   ? Essential hypertension 11/13/2002   ? Myocardial infarction (H) 06/28/2005   ? Neuropathy 2016   ? Sleep apnea 2006   ? Substance abuse (H)    ? Syncope, unspecified syncope type 03/13/2018   ? Thyroid disease 2005   ? Type 2 diabetes mellitus (H) 2000       HISTORY OF HEART RHYTHM ABNORMALITIES (check only group subject is 'randomized[' to-only 1)  []  Group 1: History of paroxysmal atrial fibrillation (no excluded medications)  []  Group 2: History of paroxysmal atrial fibrillation (on excluded medications)    []  Group 3: History of high-rate atrial fibrillation   []  Group 4: History of atrial fibrillation with rate control medications    [x]  Group 5: Permanent/persistent atrial fibrillation    []  Group 6: history of atrial flutter  []  Group 7: Frequent PVCs  []  Group 8: Frequent PACs  []  Group 9: BBB (left or right)  []  Group 10: History of 2nd Heart Block (any type)  []  Group 11: History of bigeminy, trigeminy, and/or quadgeminy  []  Group 12: History of tachycardia     Special interest allergies: active allergic skin reactions  No Known Allergies    Current Outpatient Medications:   ?  apixaban ANTICOAGULANT (ELIQUIS) 5 mg Tab tablet, Take by mouth 2 (two) times a day., Disp: , Rfl:   ?  atorvastatin (LIPITOR) 40 MG tablet, Take 40 mg by mouth at  "bedtime., Disp: , Rfl:   ?  carvediloL (COREG) 25 MG tablet, Take 25 mg by mouth 2 (two) times a day with meals., Disp: , Rfl:   ?  cholecalciferol, vitamin D3, 1,000 unit (25 mcg) tablet, Take 2,000 Units by mouth daily., Disp: , Rfl:   ?  clopidogreL (PLAVIX) 75 mg tablet, Take 75 mg by mouth daily., Disp: , Rfl:   ?  furosemide (LASIX) 40 MG tablet, Take 40 mg by mouth 2 (two) times a day., Disp: , Rfl:   ?  gabapentin (NEURONTIN) 100 MG capsule, Take 100 mg by mouth 3 (three) times a day., Disp: , Rfl:   ?  insulin aspart U-100 (NOVOLOG) 100 unit/mL (3 mL) injection pen, Inject under the skin., Disp: , Rfl:   ?  insulin glargine (LANTUS) 100 unit/mL vial, Inject under the skin at bedtime., Disp: , Rfl:   ?  isosorbide mononitrate (IMDUR) 60 MG 24 hr tablet, Take 60 mg by mouth daily., Disp: , Rfl:   ?  levothyroxine (SYNTHROID, LEVOTHROID) 137 MCG tablet, Take 137 mcg by mouth daily., Disp: , Rfl:   ?  lisinopril (PRINIVIL,ZESTRIL) 40 MG tablet, Take 40 mg by mouth daily., Disp: , Rfl:   ?  nitroglycerin (NITROSTAT) 0.3 MG SL tablet, Place 0.4 mg under the tongue every 5 (five) minutes as needed for chest pain., Disp: , Rfl:   ?  pantoprazole (PROTONIX) 40 MG tablet, Take 40 mg by mouth daily., Disp: , Rfl:   ?  spironolactone (ALDACTONE) 25 MG tablet, Take 25 mg by mouth daily., Disp: , Rfl:       10-sec 12-lead ECG & 30-sec 12-lead ECG rhythm strip done; reviewed by & PE done by Starr Verde     Subject Questionnaire    OCCUPATION: retired    Predominately works outdoors  [] Yes    [x] No      Hours/week spent outdoors (total, not only for work): 1    Frequently participates in \"hand intensive\" activities [] Yes [x] No    Caffeine  []  Never  [] Occasionally        []  Daily (1 cup/day)     [x]  Daily (>1 cup/day)    Alcohol   []  Never  [x] Light (drink or 2 occasionally) []  Moderate (a drink or 2 almost daily)   []  Occasional-heavy (more than a few drinks <2x / month)  [] Heavy (more than a few drinks " ">2x / month)    Tobacco/nicotine  [x]  Never  [] Rarely  []  Frequently/ Daily     Mattress Information    Mattress type:  []  Memory foam [] Gel  [x] Innerspring (coil)  [] Airbed  [] Waterbed [] Shikibuton  [] Hybrid  [] No mattress  [] Other (comment):      Mattress foundation   [] Mattress on floor/ground    [] Mattress on foundation/box spring on floor/ground  [x] Mattress on foundation/box spring on bed frame  [] Mattress on tatami on floor/ground  [] Other (comment):    Mattress topper   [] No mattress topper  [x] Pillow top  [] Foam top - flat style  [] Foam top - \"egg crate\" style  [] Other (comment):    Co-sleeper    [x]  Yes  []  No      CPAP use   [] Yes  [x] No      Dominant hand [] left  [x] right [] ambidextrous  Preferred Wrist to wear band on   [x]  left   []  right   Were screening day & study day: [x]  same [] different   Same: wrist circumference:      190   mm  Device wearing wrist skin fold thickness:       7.6 mm  Wrist Band Size:     []  Flush Fit S/M  []  Non-Flush Fit S/M   [x]  Flush Fit M/L  []  Non-Flush Fit M/L  []  Flush Fit XL  []  Non-Flush Fit XL    Preferred/natural band notch: 5  Secure band notch: 5  Crown orientation:  []  left   [x]  right  Device wearing wrist hairiness:     []  Light [x]  Medium       []  Heavy  Spectophotometer   L A B   Reading #1  63.51 7.75 14.32   Reading #2 63.20 8.66 14.42   Reading #3 60.77 9.59 14.28     Pregnancy test    [] WOCBP (age <55 yrs, no tubal ligation, no hysterectomy)    [x] n/a male or female not child bearing potential  For Stage 2: subject will use exercise bike for exercise portion of the study  Room Temperature: 20 C  CS Laptop ID: [id number]  CS Cam ID:[id number]   Device Set ID: [id number]  Wrist Device ID:[id number]   Subject has now completed their in-house participation in the Zestar study. Subject will complete Stage 3 at home for the next 3 days and return the equipment on 1/30/2020.    Starr Lamb"

## 2021-07-20 NOTE — PROGRESS NOTES
Progress Notes by Starr Lamb at 1/27/2020 12:30 PM     Author: Starr Lamb Service: -- Author Type: Patient Access    Filed: 1/29/2020  8:38 AM Encounter Date: 1/27/2020 Status: Signed    : Randell Marr MD (Physician)    Related Notes: Original Note by Starr Lamb (Patient Access) filed at 1/27/2020  4:00 PM           [facility] Study Inclusion / Exclusion Criteria  Protocol Version 4.0 ([id number])    Inclusion Criteria    Yes No Criteria # Subject must meet all inclusion criteria:      [x]    []  1 At least 18 years old for NSR and 22 years old for Non-NSR. Inclusive for both cohorts, at time of screening, with no upper limit on age.        [x]      []  2 To be enrolled as a non-NSR subject the volunteer must have one of the following conditions: Permanent/Persistent AF, Hx of paroxysmal AF, Hx of High-rate AF, AF + rate control medication, Hx Atrial Flutter, PVC burden >1%, Frequent PACs, Hx BBB, Hx 2nd degree block (any type), Hx Bigeminy/Trigeminy/Quadgeminy, Hx Tachycardia. For subjects with any of the following diagnoses, the condition must be present at the time of screening:   ? Permanent/persistent AF  ? PVC [place]  ? Frequent PACs   Note: If not present at screening, subjects may not be enrolled as a non-NSR subject or NSR subject.         3  [x] N/A HE site For Subjects to be enrolled as NSR they must be in NSR at the time of screening as determined by the investigator. For subjects ?65 years old with PVC burden of ?1% or infrequent PACs (<3 ectopic beats in 30 seconds), they may qualify as individuals in the NSR cohort as long as they don't have a medical history/diagnosis of significant ectopic burden. For subjects ? 66 years old with PVC burden > 1% but ?10%, they may qualify as individuals in the NSR cohort as long as they don't have a medical history/diagnosis of significant ectopic burden.    [x]  []  4 Able to read and understand a written ICF    [x]  []  5 Willing and able to  participate in the study procedures and comply with its restrictions    [x]  []  6 Able to communicate effectively with study staff as well as understand and follow directions    All must be Yes    Exclusion Criteria-all must be no  Yes No Criteria # Subject must not meet any exclusion criteria:    []  [x]  1 Physical disability that prevents safe and adequate testing.   []  [x]  2 Pregnant women or women planning to become pregnant   []  [x]  3 Any acute illness or condition that may interfere with study procedures (e.g. cough, fever, sore throat, headache, sunburn, etc.)   []  [x]  4 Clinically significant hand tremors, as judged by the Investigator   []  [x]  5 Resting hypertension with systolic blood pressure ?161 mmHg or diastolic blood pressure ?101 mmHg (if at least 2 of 3 measurements meet this criteria)   []  [x]  6 Subjects with a pacemaker or an automated implantable cardioverter- defibrillator (AICD)   []  [x]  7 Acute myocardial infarction (MI) within 90 days from the screening visit   []  [x]  8 Other cardiovascular disease that increases the risk to the subject or would render the data uninterpretable in the opinion of the Investigator (e.g., recent or ongoing unstable angina, significant valvular heart disease or chronic heart failure, myocarditis or pericarditis)    []  [x]  9 Acute pulmonary embolism, pulmonary infarction, or deep vein thrombosis within 90 days from the screening visit    []  [x]  10 Stroke or transient ischemic attack within 90 days from the screening visit    []  [x]  11 Known untreated medical conditions as determined by the Investigator, such as but not limited to significant anemia, important electrolyte imbalance and untreated or uncontrolled thyroid disease.    []  [x]  12 Any history of wrist surgery with scarring in the area of the sensor location on the wrist where the subject will be wearing the watch;    []  [x]  13 Open wound(s) on the wrist and forearm where the  subject will be wearing the watch    []  [x]  14 Severe symptomatic (or active) overly dry/injured skin, skin disorders, or allergic skin reactions such as eczema, rosacea, impetigo, dermatomyositis or allergic contact dermatitis on wrist and locations where the electrodes will be placed (e.g. chest, forearms, stomach), as determined by the investigator.    []  [x]  15 Tattoos, scars or moles in the area of the sensor location on the wrist where the subject will be wearing the watch    []  [x]  16 Device wearing Wrist circumference ? 129 mm or ? 246 mm    []  [x]  17 Known significant sensitivity to medical adhesives or isopropyl alcohol (for ECG electrode placement)    []  [x]  18 Known allergy or sensitivity to fluorocarbon-based synthetic rubber, such as contact dermatitis with fluoroelastomer bands primarily used in wrist worn fitness devices    []  [x]  19 Subjects with any Medical History, Physical exam, vital sign or any other study procedure finding/assessment that in the opinion of the investigator could compromise subject safety during study participation or interfere with the study integrity and/or the accurate assessment of the study objectives    []  [x]  20 Subject works for a company that develops or sells medical and/or fitness devices (e.g., ECG monitors, wearable fitness bands, sleep monitors, etc.) or are technology journalists (e.g., professional bloggers, TV, magazine, newspaper reporters, etc.)    []  [x]  21 Weight > 181 kg for subjects using the stationary bike and/or treadmill. Weight of ?138 kg for NSR subjects.    []  [x]  22 Subject is employed in shift work, or otherwise does not maintain a reasonably consistent day/night schedule (e.g. Subjects who go to bed after 4am).    []  [x]  23 Overnight travel planned during data collection nights    []  [x]  24 Non-NSR subjects should not have partaken in strenuous physical activity within 12 hours prior to screening    []  [x]  25 Non-NSR  subjects with Atrial fibrillation categories: Subjects taking Class 1 or Class 3 antiarrhythmic agents such as the following may not take part in any stage of the study: amiodarone, sotalol, dronedarone, ibutilide, dofetilide, propafenone, quinidine, procainamide, disopyramide, flecainide (Subjects taking class 2, 4 or 5 antiarrhythmic agents may take part the study).    []  [x]  26 Subjects who have both a history of paroxysmal AF and a Chandler skin type measurement of VI    []  [x]  27 Subjects who have missing index fingers on both hands      Subject has met all inclusion criteria and no exclusion criteria have been met.   Subject is ready to fully enrolled in the Zestar study.    Starr Lamb

## 2021-07-20 NOTE — PROGRESS NOTES
Progress Notes by Starr Lamb at 1/30/2020  3:31 PM     Author: Starr Lamb Service: -- Author Type: Patient Access    Filed: 1/30/2020  3:32 PM Encounter Date: 1/30/2020 Status: Signed    : Starr Lamb (Patient Access)         Zestar Study Device Return    Jayro Elder returned all the devices for the Zestar study.  Jayro Elder denies medication changes or adverse events since last visit.    Jayro Elder has now completed their participation in the Zestar study.   Thank you for your gift of participation.    Starr Lamb

## 2021-07-27 ENCOUNTER — TELEPHONE (OUTPATIENT)
Dept: PODIATRY | Facility: CLINIC | Age: 71
End: 2021-07-27

## 2021-07-27 NOTE — TELEPHONE ENCOUNTER
Received the following staff message:    Saba Stover [facility] Podiatry Rn  Hi,     Pt states that his left foot is bleeding and smells due to callus. Pt would like to know if Dr. Root can fit him in any earlier than 8/20. Please call pt back.     TELEPHONE: [phone]     Saba Hebert

## 2021-07-27 NOTE — TELEPHONE ENCOUNTER
"Phone call to patient. He states he has had a callus on the bottom of his left great toe for quite awhile that Dr. Root has been treating previously. It started bleeding approximately 2 weeks ago while they were on vacation and has a foul odor. He is having some increased pain going up his leg but denies redness of the callus or foot. However, he does report having a \"rash\" on his foot that has spread to other areas of his body. Dermatology has recommended several things that haven't helped and told him it was eczema.   He does report having chills off and on for the 2 weeks but has not checked his temperature. He has a decreased appetite with some diarrhea at times.   Recommended that if he develops fever or symptoms worsen, to go to the ED. Otherwise, appointment scheduled for 1:15 arrival tomorrow, 7/29/21 with Dr. Root in [place]. He verbalized understanding.     ROLANDA Bates RN      "

## 2021-07-28 ENCOUNTER — OFFICE VISIT (OUTPATIENT)
Dept: PODIATRY | Facility: CLINIC | Age: 71
End: 2021-07-28
Payer: COMMERCIAL

## 2021-07-28 VITALS
SYSTOLIC BLOOD PRESSURE: 118 MMHG | DIASTOLIC BLOOD PRESSURE: 72 MMHG | BODY MASS INDEX: 32.39 KG/M2 | WEIGHT: 266 LBS | HEIGHT: 76 IN

## 2021-07-28 DIAGNOSIS — Z79.4 TYPE 2 DIABETES MELLITUS WITH DIABETIC NEPHROPATHY, WITH LONG-TERM CURRENT USE OF INSULIN (H): ICD-10-CM

## 2021-07-28 DIAGNOSIS — L97.421 DIABETIC ULCER OF LEFT MIDFOOT ASSOCIATED WITH TYPE 2 DIABETES MELLITUS, LIMITED TO BREAKDOWN OF SKIN (H): Primary | ICD-10-CM

## 2021-07-28 DIAGNOSIS — E11.621 DIABETIC ULCER OF LEFT MIDFOOT ASSOCIATED WITH TYPE 2 DIABETES MELLITUS, LIMITED TO BREAKDOWN OF SKIN (H): Primary | ICD-10-CM

## 2021-07-28 DIAGNOSIS — E11.21 TYPE 2 DIABETES MELLITUS WITH DIABETIC NEPHROPATHY, WITH LONG-TERM CURRENT USE OF INSULIN (H): ICD-10-CM

## 2021-07-28 DIAGNOSIS — E11.42 DIABETIC POLYNEUROPATHY ASSOCIATED WITH TYPE 2 DIABETES MELLITUS (H): ICD-10-CM

## 2021-07-28 PROCEDURE — 99213 OFFICE O/P EST LOW 20 MIN: CPT | Mod: 25 | Performed by: PODIATRIST

## 2021-07-28 PROCEDURE — 97597 DBRDMT OPN WND 1ST 20 CM/<: CPT | Performed by: PODIATRIST

## 2021-07-28 ASSESSMENT — MIFFLIN-ST. JEOR: SCORE: 2063.07

## 2021-07-28 NOTE — LETTER
"    7/28/2021         RE: Jayro Elder  [address] [place] [address]        Dear Colleague,    Thank you for referring your patient, Jayro Elder, to the [facility]. Please see a copy of my visit note below.    ASSESSMENT:  Encounter Diagnoses   Name Primary?     Diabetic ulcer of left midfoot associated with type 2 diabetes mellitus, limited to breakdown of skin (H) Yes     Type 2 diabetes mellitus with diabetic nephropathy, with long-term current use of insulin (H)      Diabetic polyneuropathy associated with type 2 diabetes mellitus (H)      MEDICAL DECISION MAKING:  This ulceration is chronic with the underlying contributing chronic conditions of diabetes with peripheral neuropathy.    There are no clinical signs of infection today.  I probed the wound and did not find any deep probing.  Although there is very thick hyperkeratotic and macerated tissue, I do not think the ulcer is deeper than the level of the skin.    No indication for plain films today.    Excisional debridement was performed, please see below.    He is to continue offloading with the  shoe shoe.  We reviewed daily monitoring, cleansing of the wound topical antibiotic application and a light dressing.    Follow-up in 2 to 3 weeks.    Excisional Debridement    The excisional debridement procedure was discussed.  This included the goals of removing non-viable tissue, evaluating the full extent of wound, and promoting wound healing.  Jayro Elder  provided verba consent.  The \"Time Out\" was called, confirming procedure and location.     Using a sterile #15 blade and tissue nippers, excisional debridment of the left foot ulcer was performed. The hyperkeratotic eschar surrounding the wound was removed, by excising skin edges back to healthy, bleeding tissue. Non-viable tissue was excised.  Debridement was carried out to the depth of deeper skin. The ulcer base was scraped to remove " bioburden and promote healing.  The area debrided was less than 20 square cm.  There was some bleeding with this procedure.  This was controlled with silver nitrate stick.  No anesthesia was needed due to peripheral neuropathy.   A sterile dressing was applied.      Disclaimer: This note consists of symbols derived from keyboarding, dictation and/or voice recognition software. As a result, there may be errors in the script that have gone undetected. Please consider this when interpreting information found in this chart.    Kwasi Root, BRICE, FACFAS, MS    [place] [facility] of Podiatry/Foot & Ankle Surgery      ____________________________________________________________________    HPI:         Chief Complaint: callus, left foot (recurrent ulcer)  Onset of problem: 4 years  Pain/ discomfort is described as:  Burning, deep ache  Pain Ratin/10   Frequency:  daily    The pain is exacerbated by standing  I performed a partial left third toe amputation on 2021 for treatment of ulceration cellulitis and osteomyelitis.  At that time he had an ulceration on the plantar left foot and excisional debridement was performed.  He was last evaluated in clinic on 2021.  He presents in a  2 shoe today.  He has seen Dr. Mckeon on multiple occasions for treatment of this ulcer.  He wishes there is a way it would heal.    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     Cardiomyopathy (H)      Cellulitis of left lower extremity 3/13/2018     Cerebral infarction (H)     eye sight decreased in peripheral of right side and blurry     Cerebral infarction (H) 2016     Diabetic ulcer of left midfoot associated with type 2 diabetes mellitus, with fat layer exposed (H) 3/13/2018      Essential hypertension 11/13/2002     Essential hypertension, benign 11/13/2002     Generalized osteoarthrosis, unspecified site 11/13/2002     Microalbuminuria 3/13/2018    X1     Myocardial infarction (H)      Myocardial infarction (H) 06/28/2005     Neuropathy      Neuropathy 2016     Sepsis (H)      Sleep apnea     doesn't use it     Sleep apnea 2006     Substance abuse (H)      Substance abuse (H)      Syncope, unspecified syncope type 3/13/2018     Syncope, unspecified syncope type 03/13/2018     Thyroid disease     takes medicine     Thyroid disease 2005     Tobacco use disorder 11/13/2002     Type 2 diabetes mellitus (H) 2000     Type 2 diabetes mellitus with diabetic peripheral angiopathy without gangrene, unspecified long term insulin use status 2005   *  *  Past Surgical History:   Procedure Laterality Date     AMPUTATE TOE(S) Right 1/6/2019    Procedure: Right open second toe partial amputation;  Surgeon: Sabino Garcia DPM;  Location: [facility] OR     AMPUTATE TOE(S) Right 1/8/2019    Procedure: 1.  Revision right second toe amputation with resection of distal half of proximal phalanx with full closure.    2.  Debridement of callus/preulcerative lesion, distal left second toe and plantar left first metatarsophalangeal joint.;  Surgeon: Ryan Bhagat DPM;  Location: [facility] OR     AMPUTATE TOE(S) Right 3/12/2019    Procedure: Partial right fourth toe amputation.;  Surgeon: Ryan Bhagat DPM;  Location: [facility] OR     AMPUTATE TOE(S) Right 5/6/2019    Procedure: Right partial third toe amputation;  Surgeon: Татьяна Mckeon DPM, Podiatry/Foot and Ankle Surgery;  Location: [facility] OR     AMPUTATE TOE(S) Left 4/18/2020    Procedure: LEFT SECOND TOE AMPUTATION;  Surgeon: Ryan Bhagat DPM;  Location: [facility] OR     AMPUTATE TOE(S) Left 5/8/2021    Procedure: 1.  Amputation of the left third toe at the level of the proximal interphalangeal joint. 2.  Excisional debridement of less than 20 square cm down to the  level of the deeper skin, plantar left foot.;  Surgeon: Kwasi Root DPM;  Location: [facility] OR     AMPUTATE TOE(S) Right 2019    4 toes amputation      ANGIOGRAM  6/29/05    subtotal occ.mid LAD,SUSAN mid LAD     ANGIOGRAM  7/05    mild CAD,patent stent,no flow-limiting lesions,sev.LV dysf.LAD enlarged     ANGIOGRAM  2/09    Sev.single vessel disease,Mod LV dysf.distal LAD 90%,70-75% mid lad just before prev stent,SUSAN to prox.mid LAD, endeavor to distal LAD     ANGIOGRAM  11/13/13    restenosis, stent LAD     BACK SURGERY      back surgery x3     CARDIAC CATHETERIZATION  07/08/2019     CARDIAC CATHETERIZATION Left 06/13/2017     CARDIAC CATHETERIZATION  2005,2009,20013     CORONARY STENT PLACEMENT  07/08/2019     CV CORONARY ANGIOGRAM N/A 7/8/2019    Procedure: Coronary Angiogram;  Surgeon: Jose Alfredo Crockett MD;  Location:  HEART CARDIAC CATH LAB     CV CORONARY ANGIOGRAM N/A 4/9/2021    Procedure: CV CORONARY ANGIOGRAM;  Surgeon: Warren Paulson MD;  Location: [facility] CARDIAC CATH LAB     CV LEFT HEART CATH N/A 7/8/2019    Procedure: Left Heart Cath;  Surgeon: Jose Alfredo Crockett MD;  Location:  HEART CARDIAC CATH LAB     CV PCI ASPIRATION THROMECTOMY N/A 7/8/2019    Procedure: PCI Aspiration Thrombectomy;  Surgeon: Jose Alfredo Crockett MD;  Location:  HEART CARDIAC CATH LAB     CV PCI STENT DRUG ELUTING N/A 7/8/2019    Procedure: PCI Stent Drug Eluting;  Surgeon: Jose Alfredo Crockett MD;  Location:  HEART CARDIAC CATH LAB     HEART CATH LEFT HEART CATH  06/13/2017    SUSAN to OM3     INCISION AND DRAINAGE TOE, COMBINED Bilateral 9/11/2018    Procedure: COMBINED INCISION AND DRAINAGE TOE;  1) Irrigation and debridement ulcer left great toe  2) Amputation 3rd toe right foot at distal interphalangeal joint level;  Surgeon: Miki Harp MD;  Location: [facility] OR     IRRIGATION AND DEBRIDEMENT FOOT, COMBINED Left 3/12/2019    Procedure: Debridement of left foot ulcer sub first MPJ 2 x 2.5 cm down to and  including subcutaneous tissue, callus debridement for preulcerative lesion, left second toe.;  Surgeon: Ryan Bhagat DPM;  Location: [facility] OR     ORTHOPEDIC SURGERY      bunion surgery both feet     ORTHOPEDIC SURGERY      left shoulder     OTHER SURGICAL HISTORY  9 years old    cancer tumor mouth     SHOULDER ARTHROSCOPY W/ ROTATOR CUFF REPAIR Left 2004     SOFT TISSUE SURGERY      debridement of toe numerous time     VASCULAR SURGERY      7 stents in heart     VASCULAR SURGERY       [facility] NONSPECIFIC PROCEDURE      Laminectomy x 3 - (1983 x 2 & 1990)     [facility] NONSPECIFIC PROCEDURE Bilateral 1998    Bunionectomy     [facility] NONSPECIFIC PROCEDURE  1959    Gingival surgery at age 9   *  *  Current Outpatient Medications   Medication Sig Dispense Refill     apixaban ANTICOAGULANT (ELIQUIS) 5 MG tablet Take 1 tablet (5 mg) by mouth 2 times daily       atorvastatin (LIPITOR) 40 MG tablet Take 1 tablet (40 mg) by mouth every evening 90 tablet 3     carvedilol (COREG) 25 MG tablet Take 1 tablet (25 mg) by mouth 2 times daily (with meals) 180 tablet 3     cetirizine (ZYRTEC) 10 MG tablet Take 1 tablet (10 mg) by mouth daily 90 tablet 0     clopidogrel (PLAVIX) 75 MG tablet Take 1 tablet (75 mg) by mouth daily 90 tablet 3     Continuous Blood Gluc  (FREESTYLE CLARITA 2 READER SYSTM) DRAGAN 1 Device daily 1 each 3     Continuous Blood Gluc Sensor (FREESTYLE CLARITA 2 SENSOR SYSTM) MISC 1 Units 4 times daily (before meals and nightly) 1 each 3     ferrous sulfate (FEROSUL) 325 (65 Fe) MG tablet Take 1 tablet (325 mg) by mouth daily (with breakfast) (Patient not taking: Reported on 6/24/2021) 30 tablet 3     furosemide (LASIX) 40 MG tablet Take 1 tablet (40 mg) by mouth 2 times daily 180 tablet 1     gabapentin (NEURONTIN) 300 MG capsule TAKE 2-3 CAPSULES (600-900 MG) BY MOUTH AT BEDTIME 180 capsule 1     insulin aspart (NOVOLOG FLEXPEN) 100 UNIT/ML pen Inject Subcutaneous 3 times daily (with meals) Sliding Scale  Do not give  "sliding scale insulin if Pre-Meal BG less than 140.   For Pre-Meal:   - 200 give 2 units.    - 250 give 4 units.    - 300 give 6 units.    - 350 give 8 units.    - 400 give 10 units.       insulin aspart (NOVOLOG PEN) 100 UNIT/ML pen Inject 12 Units Subcutaneous 2 times daily (with meals) With breakfast and lunch       insulin aspart (NOVOLOG PEN) 100 UNIT/ML pen Inject 14 Units Subcutaneous daily (with dinner)       insulin glargine (LANTUS PEN) 100 UNIT/ML pen Inject 44 units subcutaneously once every morning       insulin pen needle (31G X 6 MM) 31G X 6 MM miscellaneous Use  as directed. 100 each 11     isosorbide mononitrate (IMDUR) 30 MG 24 hr tablet Take 1 tablet (30 mg) by mouth daily 90 tablet 1     levothyroxine (SYNTHROID, LEVOTHROID) 137 MCG tablet Take 1 tablet by mouth once every evening 90 tablet 3     lisinopril (ZESTRIL) 5 MG tablet Take 1 tablet (5 mg) by mouth daily 90 tablet 3     nitroGLYcerin (NITROSTAT) 0.4 MG sublingual tablet For chest pain place 1 tablet under the tongue every 5 minutes for 3 doses. If symptoms persist 5 minutes after 1st dose call 911. 15 tablet 3     pantoprazole (PROTONIX) 40 MG EC tablet TAKE 1 TABLET BY MOUTH EVERY MORNING BEFORE BREAKFAST 90 tablet 1     potassium chloride ER (KLOR-CON M) 20 MEQ CR tablet Take 1 tablet (20 mEq) by mouth daily (Patient not taking: Reported on 6/24/2021) 90 tablet 3     triamcinolone (KENALOG) 0.1 % external cream Apply topically 2 times daily 30 g 0     Vitamin D, Cholecalciferol, 25 MCG (1000 UT) TABS Take 1 tablet by mouth once daily           EXAM:    Vitals: /72   Ht 1.93 m (6' 4\")   Wt 120.7 kg (266 lb)   BMI 32.38 kg/m    BMI: Body mass index is 32.38 kg/m .    Constitutional:  Jayro W [place] is in no apparent distress, appears well-nourished.  Cooperative with history and physical exam.    Vascular:  Pedal pulses are palpable for both the DP and PT arteries.  CFT < 3 sec.  No edema.  "     Neuro: Light touch sensation is diminished, bilateral foot,  to the L4, L5, S1 distributions     Derm: Prolific hyperkeratotic lesion subleft first metatarsal head.  There is hyperpigmentation indicative of intraepidermal bleeding.  There is a proximal fissure.  With excisional debridement this reveals a 2.5 cm ulceration to the depth of deeper skin.  The skin is boggy and verrucoid appearing.     Musculoskeletal:    Lower extremity muscle strength is normal. No gross deformities.  Partial amputations of the left third and fourth toe.              Again, thank you for allowing me to participate in the care of your patient.        Sincerely,        Kwasi Root DPM

## 2021-07-28 NOTE — PATIENT INSTRUCTIONS
Thank you for choosing [facility] Podiat[facility] / Foot & Ankle Surgery!    DR. VÁSQUEZ'S CLINIC LOCATIONS     [facility] SCHEDULE SURGERY: [phone]   [address] [place] [address] APPOINTMENTS: [phone]   [place] [address] BILLING QUESTIONS: [phone]  [phone] RADIOLOGY: [phone]       [facility]    [address]  [address]    [place] [address]    [phone]  [phone]      Follow up: 2 weeks    SIGNS OF INFECTION    expanding redness around the wound     yellow or greenish-colored pus or cloudy wound drainage     red streaking spreading from the wound     increased swelling, tenderness, or pain around the wound     fever  *If you notice any of these signs of infection, call us right away!        Wound Care Recommendations:    1)  Keep the wound covered by a bandage when bathing.    2)  Gently clean the wound with soap water, separate from bath/shower water.      3)  Each day, apply a topical antibiotic ointment to the wound (Neosporin, Triple antibiotic, Bacitracin).   Cover with large band-aid or gauze.      5)  Please seek immediate medical attention if any increasing redness, drainage, smell, or pain related to the wound.     6)  Please return to clinic in the period of time requested by Dr. Vásquez.

## 2021-07-28 NOTE — PROGRESS NOTES
"ASSESSMENT:  Encounter Diagnoses   Name Primary?     Diabetic ulcer of left midfoot associated with type 2 diabetes mellitus, limited to breakdown of skin (H) Yes     Type 2 diabetes mellitus with diabetic nephropathy, with long-term current use of insulin (H)      Diabetic polyneuropathy associated with type 2 diabetes mellitus (H)      MEDICAL DECISION MAKING:  This ulceration is chronic with the underlying contributing chronic conditions of diabetes with peripheral neuropathy.    There are no clinical signs of infection today.  I probed the wound and did not find any deep probing.  Although there is very thick hyperkeratotic and macerated tissue, I do not think the ulcer is deeper than the level of the skin.    No indication for plain films today.    Excisional debridement was performed, please see below.    He is to continue offloading with the  shoe shoe.  We reviewed daily monitoring, cleansing of the wound topical antibiotic application and a light dressing.    Follow-up in 2 to 3 weeks.    Excisional Debridement    The excisional debridement procedure was discussed.  This included the goals of removing non-viable tissue, evaluating the full extent of wound, and promoting wound healing.  Jayro Elder  provided verba consent.  The \"Time Out\" was called, confirming procedure and location.     Using a sterile #15 blade and tissue nippers, excisional debridment of the left foot ulcer was performed. The hyperkeratotic eschar surrounding the wound was removed, by excising skin edges back to healthy, bleeding tissue. Non-viable tissue was excised.  Debridement was carried out to the depth of deeper skin. The ulcer base was scraped to remove bioburden and promote healing.  The area debrided was less than 20 square cm.  There was some bleeding with this procedure.  This was controlled with silver nitrate stick.  No anesthesia was needed due to peripheral neuropathy.   A sterile dressing was " applied.      Disclaimer: This note consists of symbols derived from keyboarding, dictation and/or voice recognition software. As a result, there may be errors in the script that have gone undetected. Please consider this when interpreting information found in this chart.    Kwasi Root DPM, FACSHAWN, MS    [facility]      ____________________________________________________________________    HPI:         Chief Complaint: callus, left foot (recurrent ulcer)  Onset of problem: 4 years  Pain/ discomfort is described as:  Burning, deep ache  Pain Ratin/10   Frequency:  daily    The pain is exacerbated by standing  I performed a partial left third toe amputation on 2021 for treatment of ulceration cellulitis and osteomyelitis.  At that time he had an ulceration on the plantar left foot and excisional debridement was performed.  He was last evaluated in clinic on 2021.  He presents in a  2 shoe today.  He has seen Dr. Mckeon on multiple occasions for treatment of this ulcer.  He wishes there is a way it would heal.    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     Cardiomyopathy (H)      Cellulitis of left lower extremity 3/13/2018     Cerebral infarction (H)     eye sight decreased in peripheral of right side and blurry     Cerebral infarction (H) 2016     Diabetic ulcer of left midfoot associated with type 2 diabetes mellitus, with fat layer exposed (H) 3/13/2018     Essential hypertension 2002     Essential hypertension, benign 2002     Generalized osteoarthrosis, unspecified site 2002     Microalbuminuria 3/13/2018    X1     Myocardial infarction (H)      Myocardial infarction (H) 2005      Neuropathy      Neuropathy 2016     Sepsis (H)      Sleep apnea     doesn't use it     Sleep apnea 2006     Substance abuse (H)      Substance abuse (H)      Syncope, unspecified syncope type 3/13/2018     Syncope, unspecified syncope type 03/13/2018     Thyroid disease     takes medicine     Thyroid disease 2005     Tobacco use disorder 11/13/2002     Type 2 diabetes mellitus (H) 2000     Type 2 diabetes mellitus with diabetic peripheral angiopathy without gangrene, unspecified long term insulin use status 2005   *  *  Past Surgical History:   Procedure Laterality Date     AMPUTATE TOE(S) Right 1/6/2019    Procedure: Right open second toe partial amputation;  Surgeon: Sabino Garcia DPM;  Location: [facility] OR     AMPUTATE TOE(S) Right 1/8/2019    Procedure: 1.  Revision right second toe amputation with resection of distal half of proximal phalanx with full closure.    2.  Debridement of callus/preulcerative lesion, distal left second toe and plantar left first metatarsophalangeal joint.;  Surgeon: Ryan Bhagat DPM;  Location: [facility] OR     AMPUTATE TOE(S) Right 3/12/2019    Procedure: Partial right fourth toe amputation.;  Surgeon: Ryan Bhagat DPM;  Location: [facility] OR     AMPUTATE TOE(S) Right 5/6/2019    Procedure: Right partial third toe amputation;  Surgeon: Татьяна Mckeon DPM, Podiatry/Foot and Ankle Surgery;  Location: [facility] OR     AMPUTATE TOE(S) Left 4/18/2020    Procedure: LEFT SECOND TOE AMPUTATION;  Surgeon: Ryan Bhagat DPM;  Location: [facility] OR     AMPUTATE TOE(S) Left 5/8/2021    Procedure: 1.  Amputation of the left third toe at the level of the proximal interphalangeal joint. 2.  Excisional debridement of less than 20 square cm down to the level of the deeper skin, plantar left foot.;  Surgeon: Kwasi Root DPM;  Location: [facility] OR     AMPUTATE TOE(S) Right 2019    4 toes amputation      ANGIOGRAM  6/29/05    subtotal occ.mid LAD,SUSAN mid LAD     ANGIOGRAM  7/05    mild CAD,patent  stent,no flow-limiting lesions,sev.LV dysf.LAD enlarged     ANGIOGRAM  2/09    Sev.single vessel disease,Mod LV dysf.distal LAD 90%,70-75% mid lad just before prev stent,SUSAN to prox.mid LAD, endeavor to distal LAD     ANGIOGRAM  11/13/13    restenosis, stent LAD     BACK SURGERY      back surgery x3     CARDIAC CATHETERIZATION  07/08/2019     CARDIAC CATHETERIZATION Left 06/13/2017     CARDIAC CATHETERIZATION  2005,2009,20013     CORONARY STENT PLACEMENT  07/08/2019     CV CORONARY ANGIOGRAM N/A 7/8/2019    Procedure: Coronary Angiogram;  Surgeon: Jose Alfredo Crockett MD;  Location:  [facility]     CV CORONARY ANGIOGRAM N/A 4/9/2021    Procedure: CV CORONARY ANGIOGRAM;  Surgeon: Warren Paulson MD;  Location:  [facility]     CV LEFT HEART CATH N/A 7/8/2019    Procedure: Left Heart Cath;  Surgeon: Jose Alfredo Crockett MD;  Location:  [facility]     CV PCI ASPIRATION THROMECTOMY N/A 7/8/2019    Procedure: PCI Aspiration Thrombectomy;  Surgeon: Jose Alfredo Crockett MD;  Location:  [facility]     CV PCI STENT DRUG ELUTING N/A 7/8/2019    Procedure: PCI Stent Drug Eluting;  Surgeon: Jose Alfredo Crockett MD;  Location:  [facility]     HEART CATH LEFT HEART CATH  06/13/2017    SUSAN to OM3     INCISION AND DRAINAGE TOE, COMBINED Bilateral 9/11/2018    Procedure: COMBINED INCISION AND DRAINAGE TOE;  1) Irrigation and debridement ulcer left great toe  2) Amputation 3rd toe right foot at distal interphalangeal joint level;  Surgeon: Miki Harp MD;  Location:  OR     IRRIGATION AND DEBRIDEMENT FOOT, COMBINED Left 3/12/2019    Procedure: Debridement of left foot ulcer sub first MPJ 2 x 2.5 cm down to and including subcutaneous tissue, callus debridement for preulcerative lesion, left second toe.;  Surgeon: Ryan Bhagat DPM;  Location:  OR     ORTHOPEDIC SURGERY      bunion surgery both feet     ORTHOPEDIC SURGERY      left shoulder      OTHER SURGICAL HISTORY  9 years old    cancer tumor mouth     SHOULDER ARTHROSCOPY W/ ROTATOR CUFF REPAIR Left 2004     SOFT TISSUE SURGERY      debridement of toe numerous time     VASCULAR SURGERY      7 stents in heart     VASCULAR SURGERY       [facility] NONSPECIFIC PROCEDURE      Laminectomy x 3 - (1983 x 2 & 1990)     [facility] NONSPECIFIC PROCEDURE Bilateral 1998    Bunionectomy     [facility] NONSPECIFIC PROCEDURE  1959    Gingival surgery at age 9   *  *  Current Outpatient Medications   Medication Sig Dispense Refill     apixaban ANTICOAGULANT (ELIQUIS) 5 MG tablet Take 1 tablet (5 mg) by mouth 2 times daily       atorvastatin (LIPITOR) 40 MG tablet Take 1 tablet (40 mg) by mouth every evening 90 tablet 3     carvedilol (COREG) 25 MG tablet Take 1 tablet (25 mg) by mouth 2 times daily (with meals) 180 tablet 3     cetirizine (ZYRTEC) 10 MG tablet Take 1 tablet (10 mg) by mouth daily 90 tablet 0     clopidogrel (PLAVIX) 75 MG tablet Take 1 tablet (75 mg) by mouth daily 90 tablet 3     Continuous Blood Gluc  (FREESTYLE CLARITA 2 READER SYSTM) DRAGAN 1 Device daily 1 each 3     Continuous Blood Gluc Sensor (FREESTYLE CLARITA 2 SENSOR SYSTM) MISC 1 Units 4 times daily (before meals and nightly) 1 each 3     ferrous sulfate (FEROSUL) 325 (65 Fe) MG tablet Take 1 tablet (325 mg) by mouth daily (with breakfast) (Patient not taking: Reported on 6/24/2021) 30 tablet 3     furosemide (LASIX) 40 MG tablet Take 1 tablet (40 mg) by mouth 2 times daily 180 tablet 1     gabapentin (NEURONTIN) 300 MG capsule TAKE 2-3 CAPSULES (600-900 MG) BY MOUTH AT BEDTIME 180 capsule 1     insulin aspart (NOVOLOG FLEXPEN) 100 UNIT/ML pen Inject Subcutaneous 3 times daily (with meals) Sliding Scale  Do not give sliding scale insulin if Pre-Meal BG less than 140.   For Pre-Meal:   - 200 give 2 units.    - 250 give 4 units.    - 300 give 6 units.    - 350 give 8 units.    - 400 give 10 units.       insulin aspart (NOVOLOG  "PEN) 100 UNIT/ML pen Inject 12 Units Subcutaneous 2 times daily (with meals) With breakfast and lunch       insulin aspart (NOVOLOG PEN) 100 UNIT/ML pen Inject 14 Units Subcutaneous daily (with dinner)       insulin glargine (LANTUS PEN) 100 UNIT/ML pen Inject 44 units subcutaneously once every morning       insulin pen needle (31G X 6 MM) 31G X 6 MM miscellaneous Use  as directed. 100 each 11     isosorbide mononitrate (IMDUR) 30 MG 24 hr tablet Take 1 tablet (30 mg) by mouth daily 90 tablet 1     levothyroxine (SYNTHROID, LEVOTHROID) 137 MCG tablet Take 1 tablet by mouth once every evening 90 tablet 3     lisinopril (ZESTRIL) 5 MG tablet Take 1 tablet (5 mg) by mouth daily 90 tablet 3     nitroGLYcerin (NITROSTAT) 0.4 MG sublingual tablet For chest pain place 1 tablet under the tongue every 5 minutes for 3 doses. If symptoms persist 5 minutes after 1st dose call 911. 15 tablet 3     pantoprazole (PROTONIX) 40 MG EC tablet TAKE 1 TABLET BY MOUTH EVERY MORNING BEFORE BREAKFAST 90 tablet 1     potassium chloride ER (KLOR-CON M) 20 MEQ CR tablet Take 1 tablet (20 mEq) by mouth daily (Patient not taking: Reported on 6/24/2021) 90 tablet 3     triamcinolone (KENALOG) 0.1 % external cream Apply topically 2 times daily 30 g 0     Vitamin D, Cholecalciferol, 25 MCG (1000 UT) TABS Take 1 tablet by mouth once daily           EXAM:    Vitals: /72   Ht 1.93 m (6' 4\")   Wt 120.7 kg (266 lb)   BMI 32.38 kg/m    BMI: Body mass index is 32.38 kg/m .    Constitutional:  Jayro W Boldenow is in no apparent distress, appears well-nourished.  Cooperative with history and physical exam.    Vascular:  Pedal pulses are palpable for both the DP and PT arteries.  CFT < 3 sec.  No edema.      Neuro: Light touch sensation is diminished, bilateral foot,  to the L4, L5, S1 distributions     Derm: Prolific hyperkeratotic lesion subleft first metatarsal head.  There is hyperpigmentation indicative of intraepidermal bleeding.  There is a " proximal fissure.  With excisional debridement this reveals a 2.5 cm ulceration to the depth of deeper skin.  The skin is boggy and verrucoid appearing.     Musculoskeletal:    Lower extremity muscle strength is normal. No gross deformities.  Partial amputations of the left third and fourth toe.

## 2021-08-02 ENCOUNTER — TRANSFERRED RECORDS (OUTPATIENT)
Dept: HEALTH INFORMATION MANAGEMENT | Facility: CLINIC | Age: 71
End: 2021-08-02

## 2021-08-03 ENCOUNTER — OFFICE VISIT (OUTPATIENT)
Dept: CARDIOLOGY | Facility: CLINIC | Age: 71
End: 2021-08-03
Attending: INTERNAL MEDICINE
Payer: COMMERCIAL

## 2021-08-03 VITALS
DIASTOLIC BLOOD PRESSURE: 78 MMHG | BODY MASS INDEX: 31.45 KG/M2 | WEIGHT: 271.8 LBS | HEART RATE: 78 BPM | SYSTOLIC BLOOD PRESSURE: 128 MMHG | HEIGHT: 78 IN | OXYGEN SATURATION: 100 %

## 2021-08-03 DIAGNOSIS — I48.20 CHRONIC ATRIAL FIBRILLATION (H): ICD-10-CM

## 2021-08-03 DIAGNOSIS — Z79.4 TYPE 2 DIABETES MELLITUS WITH DIABETIC PERIPHERAL ANGIOPATHY WITHOUT GANGRENE, WITH LONG-TERM CURRENT USE OF INSULIN (H): ICD-10-CM

## 2021-08-03 DIAGNOSIS — E11.51 TYPE 2 DIABETES MELLITUS WITH DIABETIC PERIPHERAL ANGIOPATHY WITHOUT GANGRENE, WITH LONG-TERM CURRENT USE OF INSULIN (H): ICD-10-CM

## 2021-08-03 DIAGNOSIS — I10 ESSENTIAL HYPERTENSION: ICD-10-CM

## 2021-08-03 DIAGNOSIS — I25.5 ISCHEMIC CARDIOMYOPATHY: ICD-10-CM

## 2021-08-03 DIAGNOSIS — I25.10 CORONARY ARTERY DISEASE INVOLVING NATIVE CORONARY ARTERY OF NATIVE HEART WITHOUT ANGINA PECTORIS: Primary | ICD-10-CM

## 2021-08-03 DIAGNOSIS — R19.5 DARK STOOLS: ICD-10-CM

## 2021-08-03 PROCEDURE — 99215 OFFICE O/P EST HI 40 MIN: CPT | Performed by: NURSE PRACTITIONER

## 2021-08-03 RX ORDER — FUROSEMIDE 40 MG
TABLET ORAL
Qty: 180 TABLET | Refills: 3 | Status: ON HOLD | OUTPATIENT
Start: 2021-08-03 | End: 2021-12-29

## 2021-08-03 ASSESSMENT — MIFFLIN-ST. JEOR: SCORE: 2184.63

## 2021-08-03 NOTE — LETTER
8/3/2021    Physician No Ref-Primary  No address on file    RE: Jayro Elder       Dear Colleague,    I had the pleasure of seeing Jayro Elder in the [facility].      Cardiology Clinic Progress Note    Service Date: August 3, 2021    Primary Cardiology Team: Dr. King      Reason for Visit: 3 month follow up for CAD, ischemic cardiomyopathy    HPI:   I had the pleasure of seeing Mr. Jayro Elder in the clinic today. He is a 70 year old gentleman with a complex past medical history including type 2 diabetes, hypothyroidism, hypertension, CVA, untreated sleep apnea, CKD, paroxsymal atrial fibrillation, coronary artery disease, status post multivessel PCI, ischemic cardiomyopathy, and osteomyelitis, status post multiple lower extremity digit amputations.    The patient was admitted this past spring to [facility] on 04/07/2021 after presenting with shortness of breath and chest pain. An echocardiogram was completed on 4/8/2021 showing mildly reduced LV systolic function with EF 40-45%. Global right ventricular function was mildly reduced. This appeared stable with modest interval improvement in his LV function in comparison to the previous study. He was sent for coronary angiography which demonstrated patent previously placed stents with mild, non flow limiting in-stent restenosis of mid LAD which was non flow limiting FFR negative at 0.87. His previously placed proximal and mid RCA and OM 3 stents were also patent with mild in-stent restenosis. His symptoms were felt to be secondary to fluid overload and acute CHF. He was diuresed losing 9 pounds during his hospital stay down to 258 pounds at discharge with improvement in his symptoms.     I met the patient in follow up of his hospitalization and he was doing reasonably well without recurrent chest pain. He noted that he can sometimes feel a sensation of fluttering/palpitations with his PAF. His  weights at home had been stable at 255-258 pounds.      He was seen by Dr. King to establish care in May 2021 as the patient previously followed with Dr. Tomlin who is now retired. Jayro noted an occasional random episode of chest pain at that time, but nothing with exertion. He had another toe amputation in May and has been very limited in his walking because of this. Weight at home remained stable at 254 pounds. Creatinine had bumped slightly to 1.3 so the dose of his furosemide was decreased from 80 mg in the morning and 40 mg in the evening to 40 mg twice daily.     Today, Jayro tells me that he has been doing okay since his last visit with Dr. King. He tells me that he continues to get occasional episodes of mild chest discomfort up to a few times per month. He states that these have not occurred with exertion and seem to be more related to psychologically stressful situations. He is  and has reportedly had a lot of tension with his ex-wife. He has not been using sublingual nitroglycerin. Symptoms last up to a few minutes before resolving spontaneously. He states that his battery  on his home scale and he hasn't gotten around to changing it yet so he has not been weighing himself. Weight is up a bit today on the clinic scale to 271 lbs. In reviewing medications, Jayro tells me that he thought he was supposed to be taking 40 mg once daily for furosemide rather than twice daily. He has been taking an extra 20 mg dose up to a few times a week in the afternoon if he notices worsening edema or dyspnea which he feels has been managing his symptoms adequately.      ASSESSMENT AND PLAN:  1. Chronic systolic congestive heart failure, ischemic cardiomyopathy  - EF 40-45% on most recent TTE.  - NYHA Class II symptoms. No signs or symptoms to suggest acute CHF. Weight has been trending up slightly with more frequent episodes of edema and shortness of breath, although he has only been taking 40 mg daily  for furosemide rather than 40 mg BID.  - Recommend he increase his furosemide dose to 40 mg in the morning and 20 mg in the evening. Also instructed him that he can to go up to 40 mg BID PRN if any fluid weight gain or worsening symptoms. Will repeat a BMP in about 1 week.  - Counseled on continuing to check weights daily and trying to stick to a low sodium diet (under 2,000 mg/day).    2. Coronary artery disease  - Status post multi-vessel stenting in the past. Coronary angiogram 04/08/21 showing mild in-stent restenosis but no flow limiting lesion with continued medical management recommended.  - He has continued to have occasional episodes of chest pain but no symptoms with exertion. We discussed the option of a trial of increasing imdur from 30 mg to 60 mg once daily, but he preferred to continue his current regimen as his symptoms have not been significantly limiting or bothersome for him. Reviewed that he can use PRN sublingual nitroglycerin and discussed symptoms of accelerating or unstable angina to watch for and seek evaluation if this develops.  - Continue with plavix 75 mg daily, beta blocker as above, atorvastatin 40 mg once daily, and imdur 30 mg once daily.    3. Essential hypertension  - Well-controlled. Continue current regimen.    4. Hyperlipidemia  - Treated on atorvastatin 40 mg once daily as above with most recent LDL cholesterol excellently controlled at 33 during his hospital stay in 04/2021.    5. Chronic atrial fibrillation  - [id number] Score significantly elevated at 7 (age, HTN, HF, hx of CVA, CAD). Chronically anticoagulated on apixaban 5 mg BID.  - Rate controlled on carvedilol 25 mg BID.    6. History of CVA    7. Type 2 diabetes mellitus     8. Chronic kidney disease  - Baseline creatinine of around 1.1 to 1.2.     Thank you for the opportunity to participate in this patient's care. We will repeat a BMP in about 1 week and can update him on these results by phone. I will otherwise  plan to have him see Dr. King in routine follow up in about 6 months. I encouraged him to call the clinic with concerns in the meantime, and we would be happy to see him sooner if needed.       35 total minutes was spent today including chart review, precharting, history and exam, post visit documentation, and reviewing studies as outlined above.     LORRAINE De Anda, CNP   Nurse Practitioner  [facility]  Pager: [phone]  Text Page  (8am - 5pm, M-F)    Orders this Visit:  No orders of the defined types were placed in this encounter.    No orders of the defined types were placed in this encounter.    There are no discontinued medications.  Encounter Diagnosis   Name Primary?     Ischemic cardiomyopathy      CURRENT MEDICATIONS:  Current Outpatient Medications   Medication Sig Dispense Refill     apixaban ANTICOAGULANT (ELIQUIS) 5 MG tablet Take 1 tablet (5 mg) by mouth 2 times daily       atorvastatin (LIPITOR) 40 MG tablet Take 1 tablet (40 mg) by mouth every evening 90 tablet 3     carvedilol (COREG) 25 MG tablet Take 1 tablet (25 mg) by mouth 2 times daily (with meals) 180 tablet 3     clopidogrel (PLAVIX) 75 MG tablet Take 1 tablet (75 mg) by mouth daily 90 tablet 3     Continuous Blood Gluc  (FREESTYLE CLARITA 2 READER SYSTM) DRAGAN 1 Device daily 1 each 3     Continuous Blood Gluc Sensor (FREESTYLE CLARITA 2 SENSOR SYSTM) MISC 1 Units 4 times daily (before meals and nightly) 1 each 3     furosemide (LASIX) 40 MG tablet Take 1 tablet (40 mg) by mouth 2 times daily (Patient taking differently: Take 40 mg by mouth 2 times daily Pt taking 60 mg if he starts to puff up) 180 tablet 1     gabapentin (NEURONTIN) 300 MG capsule TAKE 2-3 CAPSULES (600-900 MG) BY MOUTH AT BEDTIME 180 capsule 1     insulin aspart (NOVOLOG FLEXPEN) 100 UNIT/ML pen Inject Subcutaneous 3 times daily (with meals) Sliding Scale  Do not give sliding scale insulin if Pre-Meal BG less than 140.   For Pre-Meal:  BG  150 - 200 give 2 units.    - 250 give 4 units.    - 300 give 6 units.    - 350 give 8 units.    - 400 give 10 units.       insulin aspart (NOVOLOG PEN) 100 UNIT/ML pen Inject 12 Units Subcutaneous 2 times daily (with meals) With breakfast and lunch       insulin aspart (NOVOLOG PEN) 100 UNIT/ML pen Inject 14 Units Subcutaneous daily (with dinner)       insulin glargine (LANTUS PEN) 100 UNIT/ML pen Inject 44 units subcutaneously once every morning       insulin pen needle (31G X 6 MM) 31G X 6 MM miscellaneous Use  as directed. 100 each 11     isosorbide mononitrate (IMDUR) 30 MG 24 hr tablet Take 1 tablet (30 mg) by mouth daily 90 tablet 1     levothyroxine (SYNTHROID, LEVOTHROID) 137 MCG tablet Take 1 tablet by mouth once every evening 90 tablet 3     lisinopril (ZESTRIL) 5 MG tablet Take 1 tablet (5 mg) by mouth daily 90 tablet 3     nitroGLYcerin (NITROSTAT) 0.4 MG sublingual tablet For chest pain place 1 tablet under the tongue every 5 minutes for 3 doses. If symptoms persist 5 minutes after 1st dose call 911. 15 tablet 3     pantoprazole (PROTONIX) 40 MG EC tablet TAKE 1 TABLET BY MOUTH EVERY MORNING BEFORE BREAKFAST 90 tablet 1     triamcinolone (KENALOG) 0.1 % external cream Apply topically 2 times daily 30 g 0     Vitamin D, Cholecalciferol, 25 MCG (1000 UT) TABS Take 1 tablet by mouth once daily       cetirizine (ZYRTEC) 10 MG tablet Take 1 tablet (10 mg) by mouth daily (Patient not taking: Reported on 8/3/2021) 90 tablet 0     ferrous sulfate (FEROSUL) 325 (65 Fe) MG tablet Take 1 tablet (325 mg) by mouth daily (with breakfast) (Patient not taking: Reported on 8/3/2021) 30 tablet 3     potassium chloride ER (KLOR-CON M) 20 MEQ CR tablet Take 1 tablet (20 mEq) by mouth daily (Patient not taking: Reported on 6/24/2021) 90 tablet 3     ALLERGIES  Allergies   Allergen Reactions     No Known Allergies        PAST MEDICAL, SURGICAL, FAMILY HISTORY:  History was reviewed and updated as  needed, see medical record.    SOCIAL HISTORY:  Social History     Socioeconomic History     Marital status:      Spouse name: Not on file     Number of children: Not on file     Years of education: Not on file     Highest education level: Not on file   Occupational History     Not on file   Tobacco Use     Smoking status: Former Smoker     Packs/day: 1.00     Years: 25.00     Pack years: 25.00     Types: Cigarettes     Start date:      Quit date: 2005     Years since quittin.0     Smokeless tobacco: Never Used   Substance and Sexual Activity     Alcohol use: Yes     Alcohol/week: 4.0 standard drinks     Types: 4 Shots of liquor per week     Comment: OCCASSIONALLY      Drug use: No     Sexual activity: Yes     Partners: Female   Other Topics Concern     Parent/sibling w/ CABG, MI or angioplasty before 65F 55M? Yes      Service Not Asked     Blood Transfusions Not Asked     Caffeine Concern No     Comment: 3 cups daily     Occupational Exposure Not Asked     Hobby Hazards Not Asked     Sleep Concern Not Asked     Stress Concern Not Asked     Weight Concern Not Asked     Special Diet Yes     Comment: watching carb intake     Back Care Not Asked     Exercise No     Comment: some walking     Bike Helmet Not Asked     Seat Belt Not Asked     Self-Exams Not Asked   Social History Narrative     Not on file     Social Determinants of Health     Financial Resource Strain:      Difficulty of Paying Living Expenses:    Food Insecurity:      Worried About Running Out of Food in the Last Year:      Ran Out of Food in the Last Year:    Transportation Needs:      Lack of Transportation (Medical):      Lack of Transportation (Non-Medical):    Physical Activity:      Days of Exercise per Week:      Minutes of Exercise per Session:    Stress:      Feeling of Stress :    Social Connections:      Frequency of Communication with Friends and Family:      Frequency of Social Gatherings with Friends and Family:  "     Attends Gnosticism Services:      Active Member of Clubs or Organizations:      Attends Club or Organization Meetings:      Marital Status:    Intimate Partner Violence:      Fear of Current or Ex-Partner:      Emotionally Abused:      Physically Abused:      Sexually Abused:      Review of Systems:  Skin:  Positive for rash;scaling   Eyes:  Positive for visual blurring  ENT:  Positive for tinnitus  Respiratory:  Positive for shortness of breath;dyspnea on exertion;sleep apnea  Cardiovascular:    Positive for;chest pain;edema;lightheadedness;dizziness;fatigue  Gastroenterology: Positive for reflux  Genitourinary:  Positive for urinary frequency  Musculoskeletal:  Negative    Neurologic:  Positive for numbness or tingling of hands;numbness or tingling of feet  Psychiatric:  Negative    Heme/Lymph/Imm:  Positive for easy bruising  Endocrine:  Positive for diabetes;thyroid disorder     Physical Exam:  Vitals: /78 (BP Location: Right arm, Patient Position: Sitting, Cuff Size: Adult Regular)   Pulse 78   Ht 2.083 m (6' 10\")   Wt 123.3 kg (271 lb 12.8 oz)   SpO2 100%   BMI 28.42 kg/m     Wt Readings from Last 4 Encounters:   08/03/21 123.3 kg (271 lb 12.8 oz)   07/28/21 120.7 kg (266 lb)   06/24/21 121 kg (266 lb 12.8 oz)   06/02/21 119.3 kg (263 lb)     CONSTITUTIONAL: Appears his stated age, well nourished, and in no acute distress.  HEENT: Pupils equal, round. Sclerae nonicteric.    NECK: Supple, no masses or JVD appreciated.   C/V: Regular rate and rhythm, normal S1 and S2, no S3 or S4, no murmur, rub or gallop.   RESP: Respirations are unlabored. Lungs are clear to auscultation bilaterally without wheezing, rales, or rhonchi.  GI: Abdomen soft, non-tender, non-distended.  EXTREM: Trace LE edema bilaterally. No clubbing or cyanosis.  NEURO: Alert and oriented, cooperative. Gait steady. No gross focal deficits.   PSYCH: Affect appropriate, somewhat flat. Mentation normal. Responds to questions " appropriately.  SKIN: Warm and dry.    Recent Lab Results:  LIPID RESULTS:  Lab Results   Component Value Date    CHOL 79 04/08/2021    HDL 36 (L) 04/08/2021    LDL 33 04/08/2021    TRIG 52 04/08/2021    CHOLHDLRATIO 2.3 02/02/2015     LIVER ENZYME RESULTS:  Lab Results   Component Value Date    AST 14 04/08/2021    ALT 30 04/08/2021     CBC RESULTS:  Lab Results   Component Value Date    WBC 5.9 05/09/2021    RBC 3.77 (L) 05/09/2021    HGB 10.7 (L) 05/09/2021    HCT 33.7 (L) 05/09/2021    MCV 89 05/09/2021    MCH 28.4 05/09/2021    MCHC 31.8 05/09/2021    RDW 13.2 05/09/2021     05/09/2021     BMP RESULTS:  Lab Results   Component Value Date     05/17/2021    POTASSIUM 4.1 05/17/2021    CHLORIDE 102 05/17/2021    CO2 28 05/17/2021    ANIONGAP 6 05/17/2021     (H) 05/17/2021    BUN 21 05/17/2021    CR 1.30 (H) 05/17/2021    GFRESTIMATED 55 (L) 05/17/2021    GFRESTBLACK 64 05/17/2021    BETTIE 8.9 05/17/2021      A1C RESULTS:  Lab Results   Component Value Date    A1C 8.1 (H) 04/08/2021     CC  Livan King MD  [address] [place] [address]    This note was completed in part using Dragon voice recognition software. Although reviewed after completion, some word and grammatical errors may occur.       Thank you for allowing me to participate in the care of your patient.      Sincerely,     Gael Christensen NP     [facility]  cc:   Livan King MD  [facility]  [address] [place] [address]

## 2021-08-03 NOTE — PATIENT INSTRUCTIONS
Thank you for your visit with the [facility] today.   [facility] stands for Cardiomyopathy Optimization Rehabilitation and Education. The [facility] clinic will teach and help you to manage your heart failure and keep you out of the hospital.     Today's plan:   1. Increase lasix from 40 mg daily to 40 mg in the morning and 20 mg in the afternoon.  2. Check labs in about 1 week to recheck your kidney function and electrolytes.  3. Continue to check your weights daily and try to stick to a low sodium diet (under 2,000 mg/day).  4. Call the [facility] nurse team at the number listed below if you develop signs concerning for fluid retention, including weight gain of over 3 pounds in 1 night or over 5 pounds in 1 week, increasing shortness of breath, or increasing swelling in the legs or abdomen.    If you have questions or concerns, please do not hesitate to call my nurse Princess at [phone]    Scheduling phone number: [phone]    It was a pleasure seeing you today!     LORRAINE De Anda, CNP  Nurse Practitioner  [facility]  August 3, 2021  ________________________________________________________

## 2021-08-03 NOTE — PROGRESS NOTES
Cardiology Clinic Progress Note    Service Date: August 3, 2021    Primary Cardiology Team: Dr. King      Reason for Visit: 3 month follow up for CAD, ischemic cardiomyopathy    HPI:   I had the pleasure of seeing Mr. Jayro Elder in the clinic today. He is a 70 year old gentleman with a complex past medical history including type 2 diabetes, hypothyroidism, hypertension, CVA, untreated sleep apnea, CKD, paroxsymal atrial fibrillation, coronary artery disease, status post multivessel PCI, ischemic cardiomyopathy, and osteomyelitis, status post multiple lower extremity digit amputations.    The patient was admitted this past spring to [facility] on 04/07/2021 after presenting with shortness of breath and chest pain. An echocardiogram was completed on 4/8/2021 showing mildly reduced LV systolic function with EF 40-45%. Global right ventricular function was mildly reduced. This appeared stable with modest interval improvement in his LV function in comparison to the previous study. He was sent for coronary angiography which demonstrated patent previously placed stents with mild, non flow limiting in-stent restenosis of mid LAD which was non flow limiting FFR negative at 0.87. His previously placed proximal and mid RCA and OM 3 stents were also patent with mild in-stent restenosis. His symptoms were felt to be secondary to fluid overload and acute CHF. He was diuresed losing 9 pounds during his hospital stay down to 258 pounds at discharge with improvement in his symptoms.     I met the patient in follow up of his hospitalization and he was doing reasonably well without recurrent chest pain. He noted that he can sometimes feel a sensation of fluttering/palpitations with his PAF. His weights at home had been stable at 255-258 pounds.      He was seen by Dr. King to establish care in May 2021 as the patient previously followed with Dr. Tomlin who is now retired. Jayro noted an occasional random episode of chest  pain at that time, but nothing with exertion. He had another toe amputation in May and has been very limited in his walking because of this. Weight at home remained stable at 254 pounds. Creatinine had bumped slightly to 1.3 so the dose of his furosemide was decreased from 80 mg in the morning and 40 mg in the evening to 40 mg twice daily.     Today, Jayro tells me that he has been doing okay since his last visit with Dr. King. He tells me that he continues to get occasional episodes of mild chest discomfort up to a few times per month. He states that these have not occurred with exertion and seem to be more related to psychologically stressful situations. He is  and has reportedly had a lot of tension with his ex-wife. He has not been using sublingual nitroglycerin. Symptoms last up to a few minutes before resolving spontaneously. He states that his battery  on his home scale and he hasn't gotten around to changing it yet so he has not been weighing himself. Weight is up a bit today on the clinic scale to 271 lbs. In reviewing medications, Jayro tells me that he thought he was supposed to be taking 40 mg once daily for furosemide rather than twice daily. He has been taking an extra 20 mg dose up to a few times a week in the afternoon if he notices worsening edema or dyspnea which he feels has been managing his symptoms adequately.      ASSESSMENT AND PLAN:  1. Chronic systolic congestive heart failure, ischemic cardiomyopathy  - EF 40-45% on most recent TTE.  - NYHA Class II symptoms. No signs or symptoms to suggest acute CHF. Weight has been trending up slightly with more frequent episodes of edema and shortness of breath, although he has only been taking 40 mg daily for furosemide rather than 40 mg BID.  - Recommend he increase his furosemide dose to 40 mg in the morning and 20 mg in the evening. Also instructed him that he can to go up to 40 mg BID PRN if any fluid weight gain or worsening  symptoms. Will repeat a BMP in about 1 week.  - Counseled on continuing to check weights daily and trying to stick to a low sodium diet (under 2,000 mg/day).    2. Coronary artery disease  - Status post multi-vessel stenting in the past. Coronary angiogram 04/08/21 showing mild in-stent restenosis but no flow limiting lesion with continued medical management recommended.  - He has continued to have occasional episodes of chest pain but no symptoms with exertion. We discussed the option of a trial of increasing imdur from 30 mg to 60 mg once daily, but he preferred to continue his current regimen as his symptoms have not been significantly limiting or bothersome for him. Reviewed that he can use PRN sublingual nitroglycerin and discussed symptoms of accelerating or unstable angina to watch for and seek evaluation if this develops.  - Continue with plavix 75 mg daily, beta blocker as above, atorvastatin 40 mg once daily, and imdur 30 mg once daily.    3. Essential hypertension  - Well-controlled. Continue current regimen.    4. Hyperlipidemia  - Treated on atorvastatin 40 mg once daily as above with most recent LDL cholesterol excellently controlled at 33 during his hospital stay in 04/2021.    5. Chronic atrial fibrillation  - [id number] Score significantly elevated at 7 (age, HTN, HF, hx of CVA, CAD). Chronically anticoagulated on apixaban 5 mg BID.  - Rate controlled on carvedilol 25 mg BID.    6. History of CVA    7. Type 2 diabetes mellitus     8. Chronic kidney disease  - Baseline creatinine of around 1.1 to 1.2.     Thank you for the opportunity to participate in this patient's care. We will repeat a BMP in about 1 week and can update him on these results by phone. I will otherwise plan to have him see Dr. King in routine follow up in about 6 months. I encouraged him to call the clinic with concerns in the meantime, and we would be happy to see him sooner if needed.       35 total minutes was spent today  including chart review, precharting, history and exam, post visit documentation, and reviewing studies as outlined above.     LORRAINE De Anda, CNP   Nurse Practitioner  [facility]  Pager: [phone]  Text Page  (8am - 5pm, M-F)    Orders this Visit:  No orders of the defined types were placed in this encounter.    No orders of the defined types were placed in this encounter.    There are no discontinued medications.  Encounter Diagnosis   Name Primary?     Ischemic cardiomyopathy      CURRENT MEDICATIONS:  Current Outpatient Medications   Medication Sig Dispense Refill     apixaban ANTICOAGULANT (ELIQUIS) 5 MG tablet Take 1 tablet (5 mg) by mouth 2 times daily       atorvastatin (LIPITOR) 40 MG tablet Take 1 tablet (40 mg) by mouth every evening 90 tablet 3     carvedilol (COREG) 25 MG tablet Take 1 tablet (25 mg) by mouth 2 times daily (with meals) 180 tablet 3     clopidogrel (PLAVIX) 75 MG tablet Take 1 tablet (75 mg) by mouth daily 90 tablet 3     Continuous Blood Gluc  (FREESTYLE CLARITA 2 READER SYSTM) DRAGAN 1 Device daily 1 each 3     Continuous Blood Gluc Sensor (FREESTYLE CLARITA 2 SENSOR SYSTM) MISC 1 Units 4 times daily (before meals and nightly) 1 each 3     furosemide (LASIX) 40 MG tablet Take 1 tablet (40 mg) by mouth 2 times daily (Patient taking differently: Take 40 mg by mouth 2 times daily Pt taking 60 mg if he starts to puff up) 180 tablet 1     gabapentin (NEURONTIN) 300 MG capsule TAKE 2-3 CAPSULES (600-900 MG) BY MOUTH AT BEDTIME 180 capsule 1     insulin aspart (NOVOLOG FLEXPEN) 100 UNIT/ML pen Inject Subcutaneous 3 times daily (with meals) Sliding Scale  Do not give sliding scale insulin if Pre-Meal BG less than 140.   For Pre-Meal:   - 200 give 2 units.    - 250 give 4 units.    - 300 give 6 units.    - 350 give 8 units.    - 400 give 10 units.       insulin aspart (NOVOLOG PEN) 100 UNIT/ML pen Inject 12 Units Subcutaneous 2 times  daily (with meals) With breakfast and lunch       insulin aspart (NOVOLOG PEN) 100 UNIT/ML pen Inject 14 Units Subcutaneous daily (with dinner)       insulin glargine (LANTUS PEN) 100 UNIT/ML pen Inject 44 units subcutaneously once every morning       insulin pen needle (31G X 6 MM) 31G X 6 MM miscellaneous Use  as directed. 100 each 11     isosorbide mononitrate (IMDUR) 30 MG 24 hr tablet Take 1 tablet (30 mg) by mouth daily 90 tablet 1     levothyroxine (SYNTHROID, LEVOTHROID) 137 MCG tablet Take 1 tablet by mouth once every evening 90 tablet 3     lisinopril (ZESTRIL) 5 MG tablet Take 1 tablet (5 mg) by mouth daily 90 tablet 3     nitroGLYcerin (NITROSTAT) 0.4 MG sublingual tablet For chest pain place 1 tablet under the tongue every 5 minutes for 3 doses. If symptoms persist 5 minutes after 1st dose call 911. 15 tablet 3     pantoprazole (PROTONIX) 40 MG EC tablet TAKE 1 TABLET BY MOUTH EVERY MORNING BEFORE BREAKFAST 90 tablet 1     triamcinolone (KENALOG) 0.1 % external cream Apply topically 2 times daily 30 g 0     Vitamin D, Cholecalciferol, 25 MCG (1000 UT) TABS Take 1 tablet by mouth once daily       cetirizine (ZYRTEC) 10 MG tablet Take 1 tablet (10 mg) by mouth daily (Patient not taking: Reported on 8/3/2021) 90 tablet 0     ferrous sulfate (FEROSUL) 325 (65 Fe) MG tablet Take 1 tablet (325 mg) by mouth daily (with breakfast) (Patient not taking: Reported on 8/3/2021) 30 tablet 3     potassium chloride ER (KLOR-CON M) 20 MEQ CR tablet Take 1 tablet (20 mEq) by mouth daily (Patient not taking: Reported on 6/24/2021) 90 tablet 3     ALLERGIES  Allergies   Allergen Reactions     No Known Allergies        PAST MEDICAL, SURGICAL, FAMILY HISTORY:  History was reviewed and updated as needed, see medical record.    SOCIAL HISTORY:  Social History     Socioeconomic History     Marital status:      Spouse name: Not on file     Number of children: Not on file     Years of education: Not on file     Highest  education level: Not on file   Occupational History     Not on file   Tobacco Use     Smoking status: Former Smoker     Packs/day: 1.00     Years: 25.00     Pack years: 25.00     Types: Cigarettes     Start date:      Quit date: 2005     Years since quittin.0     Smokeless tobacco: Never Used   Substance and Sexual Activity     Alcohol use: Yes     Alcohol/week: 4.0 standard drinks     Types: 4 Shots of liquor per week     Comment: OCCASSIONALLY      Drug use: No     Sexual activity: Yes     Partners: Female   Other Topics Concern     Parent/sibling w/ CABG, MI or angioplasty before 65F 55M? Yes      Service Not Asked     Blood Transfusions Not Asked     Caffeine Concern No     Comment: 3 cups daily     Occupational Exposure Not Asked     Hobby Hazards Not Asked     Sleep Concern Not Asked     Stress Concern Not Asked     Weight Concern Not Asked     Special Diet Yes     Comment: watching carb intake     Back Care Not Asked     Exercise No     Comment: some walking     Bike Helmet Not Asked     Seat Belt Not Asked     Self-Exams Not Asked   Social History Narrative     Not on file     Social Determinants of Health     Financial Resource Strain:      Difficulty of Paying Living Expenses:    Food Insecurity:      Worried About Running Out of Food in the Last Year:      Ran Out of Food in the Last Year:    Transportation Needs:      Lack of Transportation (Medical):      Lack of Transportation (Non-Medical):    Physical Activity:      Days of Exercise per Week:      Minutes of Exercise per Session:    Stress:      Feeling of Stress :    Social Connections:      Frequency of Communication with Friends and Family:      Frequency of Social Gatherings with Friends and Family:      Attends Yazdan[employer] Services:      Active Member of Clubs or Organizations:      Attends Club or Organization Meetings:      Marital Status:    Intimate Partner Violence:      Fear of Current or Ex-Partner:      Emotionally  "Abused:      Physically Abused:      Sexually Abused:      Review of Systems:  Skin:  Positive for rash;scaling   Eyes:  Positive for visual blurring  ENT:  Positive for tinnitus  Respiratory:  Positive for shortness of breath;dyspnea on exertion;sleep apnea  Cardiovascular:    Positive for;chest pain;edema;lightheadedness;dizziness;fatigue  Gastroenterology: Positive for reflux  Genitourinary:  Positive for urinary frequency  Musculoskeletal:  Negative    Neurologic:  Positive for numbness or tingling of hands;numbness or tingling of feet  Psychiatric:  Negative    Heme/Lymph/Imm:  Positive for easy bruising  Endocrine:  Positive for diabetes;thyroid disorder     Physical Exam:  Vitals: /78 (BP Location: Right arm, Patient Position: Sitting, Cuff Size: Adult Regular)   Pulse 78   Ht 2.083 m (6' 10\")   Wt 123.3 kg (271 lb 12.8 oz)   SpO2 100%   BMI 28.42 kg/m     Wt Readings from Last 4 Encounters:   08/03/21 123.3 kg (271 lb 12.8 oz)   07/28/21 120.7 kg (266 lb)   06/24/21 121 kg (266 lb 12.8 oz)   06/02/21 119.3 kg (263 lb)     CONSTITUTIONAL: Appears his stated age, well nourished, and in no acute distress.  HEENT: Pupils equal, round. Sclerae nonicteric.    NECK: Supple, no masses or JVD appreciated.   C/V: Regular rate and rhythm, normal S1 and S2, no S3 or S4, no murmur, rub or gallop.   RESP: Respirations are unlabored. Lungs are clear to auscultation bilaterally without wheezing, rales, or rhonchi.  GI: Abdomen soft, non-tender, non-distended.  EXTREM: Trace LE edema bilaterally. No clubbing or cyanosis.  NEURO: Alert and oriented, cooperative. Gait steady. No gross focal deficits.   PSYCH: Affect appropriate, somewhat flat. Mentation normal. Responds to questions appropriately.  SKIN: Warm and dry.    Recent Lab Results:  LIPID RESULTS:  Lab Results   Component Value Date    CHOL 79 04/08/2021    HDL 36 (L) 04/08/2021    LDL 33 04/08/2021    TRIG 52 04/08/2021    CHOLHDLRATIO 2.3 02/02/2015 "     LIVER ENZYME RESULTS:  Lab Results   Component Value Date    AST 14 04/08/2021    ALT 30 04/08/2021     CBC RESULTS:  Lab Results   Component Value Date    WBC 5.9 05/09/2021    RBC 3.77 (L) 05/09/2021    HGB 10.7 (L) 05/09/2021    HCT 33.7 (L) 05/09/2021    MCV 89 05/09/2021    MCH 28.4 05/09/2021    MCHC 31.8 05/09/2021    RDW 13.2 05/09/2021     05/09/2021     BMP RESULTS:  Lab Results   Component Value Date     05/17/2021    POTASSIUM 4.1 05/17/2021    CHLORIDE 102 05/17/2021    CO2 28 05/17/2021    ANIONGAP 6 05/17/2021     (H) 05/17/2021    BUN 21 05/17/2021    CR 1.30 (H) 05/17/2021    GFRESTIMATED 55 (L) 05/17/2021    GFRESTBLACK 64 05/17/2021    BETTIE 8.9 05/17/2021      A1C RESULTS:  Lab Results   Component Value Date    A1C 8.1 (H) 04/08/2021     CC  Livan King MD  [id number] [id number]    This note was completed in part using Dragon voice recognition software. Although reviewed after completion, some word and grammatical errors may occur.

## 2021-08-10 ENCOUNTER — CARE COORDINATION (OUTPATIENT)
Dept: CARDIOLOGY | Facility: CLINIC | Age: 71
End: 2021-08-10

## 2021-08-10 DIAGNOSIS — I25.5 ISCHEMIC CARDIOMYOPATHY: ICD-10-CM

## 2021-08-10 RX ORDER — LISINOPRIL 2.5 MG/1
2.5 TABLET ORAL DAILY
Qty: 90 TABLET | Refills: 0 | Status: SHIPPED | OUTPATIENT
Start: 2021-08-10 | End: 2021-11-08

## 2021-08-10 RX ORDER — ISOSORBIDE MONONITRATE 60 MG/1
60 TABLET, EXTENDED RELEASE ORAL DAILY
Qty: 90 TABLET | Refills: 0 | Status: SHIPPED | OUTPATIENT
Start: 2021-08-10 | End: 2021-10-25

## 2021-08-10 NOTE — PROGRESS NOTES
Called Jayro to let him that [facility] is okay with increasing Imdur and decreasing lisinopril. Spoke to wife as Jayro is at the the dentist. Did let her know new prescriptions were sent in as well.     Princess Chapa RN 4:23 PM 08/10/21

## 2021-08-10 NOTE — PROGRESS NOTES
Okay to increase imdur and decrease lisinopril as previously discussed.     Thank you!     LORRAINE De Anda, CNP

## 2021-08-10 NOTE — PROGRESS NOTES
Jayro called asking if [facility] was going to sending prescriptions for Imdur and lisinopril .    Reviewed CORE note from 8/10: Jayro did not want to change meds.    He would like to try Imdur 60 mg once daily and Lisinopril 2.5 mg once daily.     Jayro said we can leave voice mail as he has dentist appt today at 1600.     Will review with [facility].     Princess Chapa RN 3:18 PM 08/10/21

## 2021-08-18 ENCOUNTER — OFFICE VISIT (OUTPATIENT)
Dept: PODIATRY | Facility: CLINIC | Age: 71
End: 2021-08-18
Payer: COMMERCIAL

## 2021-08-18 VITALS
SYSTOLIC BLOOD PRESSURE: 118 MMHG | HEIGHT: 76 IN | DIASTOLIC BLOOD PRESSURE: 70 MMHG | WEIGHT: 271 LBS | BODY MASS INDEX: 33 KG/M2

## 2021-08-18 DIAGNOSIS — E11.42 DIABETIC POLYNEUROPATHY ASSOCIATED WITH TYPE 2 DIABETES MELLITUS (H): ICD-10-CM

## 2021-08-18 DIAGNOSIS — L97.521 ULCER OF LEFT FOOT, LIMITED TO BREAKDOWN OF SKIN (H): ICD-10-CM

## 2021-08-18 DIAGNOSIS — L84 CALLUS OF FOOT: Primary | ICD-10-CM

## 2021-08-18 DIAGNOSIS — L85.3 DRY SKIN: ICD-10-CM

## 2021-08-18 DIAGNOSIS — Z79.4 TYPE 2 DIABETES MELLITUS WITH DIABETIC PERIPHERAL ANGIOPATHY WITHOUT GANGRENE, WITH LONG-TERM CURRENT USE OF INSULIN (H): ICD-10-CM

## 2021-08-18 DIAGNOSIS — E11.51 TYPE 2 DIABETES MELLITUS WITH DIABETIC PERIPHERAL ANGIOPATHY WITHOUT GANGRENE, WITH LONG-TERM CURRENT USE OF INSULIN (H): ICD-10-CM

## 2021-08-18 PROCEDURE — 99213 OFFICE O/P EST LOW 20 MIN: CPT | Performed by: PODIATRIST

## 2021-08-18 RX ORDER — AMMONIUM LACTATE 12 G/100G
CREAM TOPICAL 2 TIMES DAILY
Qty: 385 G | Refills: 3 | Status: SHIPPED | OUTPATIENT
Start: 2021-08-18 | End: 2022-01-01

## 2021-08-18 ASSESSMENT — MIFFLIN-ST. JEOR: SCORE: 2085.75

## 2021-08-18 NOTE — LETTER
"    8/18/2021         RE: Jayro Elder  [address] [place] [address]        Dear Colleague,    Thank you for referring your patient, Jayro Elder, to the [facility]. Please see a copy of my visit note below.    ASSESSMENT:  Encounter Diagnoses   Name Primary?     Callus of foot Yes     Dry skin      Ulcer of left foot, limited to breakdown of skin (H)      Diabetic polyneuropathy associated with type 2 diabetes mellitus (H)      Type 2 diabetes mellitus with diabetic peripheral angiopathy without gangrene, with long-term current use of insulin (H)      MEDICAL DECISION MAKING:  This ulceration is chronic with the underlying contributing chronic conditions of diabetes with peripheral neuropathy.  This is nearly healed.  Anticipate with the removal of the hyperkeratotic skin, this will heal shortly.     There are no clinical signs of infection today    He is to cleanse the area daily, blot dry, apply a topical antibiotic and light dressing.  He will continue with the  2 shoe for offloading.  Follow-up in 1 month.    There is dry scaly hyperkeratotic skin around the area of ulceration.  For this I recommend use a pumice stone.  I also prescribed AmLactin 12% cream.      Excisional Debridement    The excisional debridement procedure was discussed.  This included the goals of removing non-viable tissue, evaluating the full extent of wound, and promoting wound healing.  Jayro Elder  provided verba consent.  The \"Time Out\" was called, confirming procedure and location.     Using a sterile #15 blade and tissue nippers, excisional debridment of the left foot ulcer was performed. The hyperkeratotic eschar surrounding the wound was removed, by excising skin edges back to healthy, bleeding tissue. Non-viable tissue was excised.  Debridement was carried out to the depth of deeper skin. The ulcer base was scraped to remove bioburden and promote healing.  The area " debrided was less than 20 square cm.   There was moderate bleeding with the procedure. No anesthesia was needed due to peripheral neuropathy.   A sterile dressing was applied.    Disclaimer: This note consists of symbols derived from keyboarding, dictation and/or voice recognition software. As a result, there may be errors in the script that have gone undetected. Please consider this when interpreting information found in this chart.    Kwasi Root, BRICE, FACFAS, MS    [place] Department of Podiatry/Foot & Ankle Surgery      ____________________________________________________________________    HPI:       Jayro follows up for a preulcerative callus and an area of recurrent ulceration left foot.  This causes him intermittent pain.  He has type 2 diabetes and peripheral neuropathy.  He is on Eliquis and Plavix.    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     Cardiomyopathy (H)      Cellulitis of left lower extremity 3/13/2018     Cerebral infarction (H)     eye sight decreased in peripheral of right side and blurry     Cerebral infarction (H) 2016     Diabetic ulcer of left midfoot associated with type 2 diabetes mellitus, with fat layer exposed (H) 3/13/2018     Essential hypertension 11/13/2002     Essential hypertension, benign 11/13/2002     Generalized osteoarthrosis, unspecified site 11/13/2002     Microalbuminuria 3/13/2018    X1     Myocardial infarction (H)      Myocardial infarction (H) 06/28/2005     Neuropathy      Neuropathy 2016     Sepsis (H)      Sleep apnea     doesn't use it     Sleep apnea 2006     Substance abuse (H)      Substance abuse (H)      Syncope, unspecified syncope type 3/13/2018     Syncope, unspecified syncope type 03/13/2018     Thyroid disease     takes medicine     Thyroid disease 2005     Tobacco  use disorder 11/13/2002     Type 2 diabetes mellitus (H) 2000     Type 2 diabetes mellitus with diabetic peripheral angiopathy without gangrene, unspecified long term insulin use status 2005   *  *  Past Surgical History:   Procedure Laterality Date     AMPUTATE TOE(S) Right 1/6/2019    Procedure: Right open second toe partial amputation;  Surgeon: Sabino Garcia DPM;  Location: [facility] OR     AMPUTATE TOE(S) Right 1/8/2019    Procedure: 1.  Revision right second toe amputation with resection of distal half of proximal phalanx with full closure.    2.  Debridement of callus/preulcerative lesion, distal left second toe and plantar left first metatarsophalangeal joint.;  Surgeon: Ryan Bhagat DPM;  Location: [facility] OR     AMPUTATE TOE(S) Right 3/12/2019    Procedure: Partial right fourth toe amputation.;  Surgeon: Ryan Bhagat DPM;  Location: [facility] OR     AMPUTATE TOE(S) Right 5/6/2019    Procedure: Right partial third toe amputation;  Surgeon: Татьяна Mckeon DPM, Podiatry/Foot and Ankle Surgery;  Location: [facility] OR     AMPUTATE TOE(S) Left 4/18/2020    Procedure: LEFT SECOND TOE AMPUTATION;  Surgeon: Ryan Bhagat DPM;  Location: [facility] OR     AMPUTATE TOE(S) Left 5/8/2021    Procedure: 1.  Amputation of the left third toe at the level of the proximal interphalangeal joint. 2.  Excisional debridement of less than 20 square cm down to the level of the deeper skin, plantar left foot.;  Surgeon: Kwasi Root DPM;  Location: [facility] OR     AMPUTATE TOE(S) Right 2019    4 toes amputation      ANGIOGRAM  6/29/05    subtotal occ.mid LAD,SUSAN mid LAD     ANGIOGRAM  7/05    mild CAD,patent stent,no flow-limiting lesions,sev.LV dysf.LAD enlarged     ANGIOGRAM  2/09    Sev.single vessel disease,Mod LV dysf.distal LAD 90%,70-75% mid lad just before prev stent,SUSAN to prox.mid LAD, endeavor to distal LAD     ANGIOGRAM  11/13/13    restenosis, stent LAD     BACK SURGERY      back surgery x3     CARDIAC CATHETERIZATION   07/08/2019     CARDIAC CATHETERIZATION Left 06/13/2017     CARDIAC CATHETERIZATION  2005,2009,33780     CORONARY STENT PLACEMENT  07/08/2019     CV CORONARY ANGIOGRAM N/A 7/8/2019    Procedure: Coronary Angiogram;  Surgeon: Jose Alfredo Crockett MD;  Location:  HEART CARDIAC CATH LAB     CV CORONARY ANGIOGRAM N/A 4/9/2021    Procedure: CV CORONARY ANGIOGRAM;  Surgeon: Warren Paulson MD;  Location:  HEART CARDIAC CATH LAB     CV LEFT HEART CATH N/A 7/8/2019    Procedure: Left Heart Cath;  Surgeon: Jose Alfredo Crockett MD;  Location:  HEART CARDIAC CATH LAB     CV PCI ASPIRATION THROMECTOMY N/A 7/8/2019    Procedure: PCI Aspiration Thrombectomy;  Surgeon: Jose Alfredo Crockett MD;  Location:  HEART CARDIAC CATH LAB     CV PCI STENT DRUG ELUTING N/A 7/8/2019    Procedure: PCI Stent Drug Eluting;  Surgeon: Jose Alfredo Crockett MD;  Location:  HEART CARDIAC CATH LAB     HEART CATH LEFT HEART CATH  06/13/2017    SUSAN to OM3     INCISION AND DRAINAGE TOE, COMBINED Bilateral 9/11/2018    Procedure: COMBINED INCISION AND DRAINAGE TOE;  1) Irrigation and debridement ulcer left great toe  2) Amputation 3rd toe right foot at distal interphalangeal joint level;  Surgeon: Miki Harp MD;  Location:  OR     IRRIGATION AND DEBRIDEMENT FOOT, COMBINED Left 3/12/2019    Procedure: Debridement of left foot ulcer sub first MPJ 2 x 2.5 cm down to and including subcutaneous tissue, callus debridement for preulcerative lesion, left second toe.;  Surgeon: Ryan Bhagat DPM;  Location:  OR     ORTHOPEDIC SURGERY      bunion surgery both feet     ORTHOPEDIC SURGERY      left shoulder     OTHER SURGICAL HISTORY  9 years old    cancer tumor mouth     SHOULDER ARTHROSCOPY W/ ROTATOR CUFF REPAIR Left 2004     SOFT TISSUE SURGERY      debridement of toe numerous time     VASCULAR SURGERY      7 stents in heart     VASCULAR SURGERY       [facility] NONSPECIFIC PROCEDURE      Laminectomy x 3 - (1983 x 2 & 1990)     [facility]  NONSPECIFIC PROCEDURE Bilateral 1998    Bunionectomy     [facility] NONSPECIFIC PROCEDURE  1959    Gingival surgery at age 9   *  *  Current Outpatient Medications   Medication Sig Dispense Refill     ammonium lactate (AMLACTIN) 12 % external cream Apply topically 2 times daily 385 g 3     apixaban ANTICOAGULANT (ELIQUIS) 5 MG tablet Take 1 tablet (5 mg) by mouth 2 times daily       atorvastatin (LIPITOR) 40 MG tablet Take 1 tablet (40 mg) by mouth every evening 90 tablet 3     carvedilol (COREG) 25 MG tablet Take 1 tablet (25 mg) by mouth 2 times daily (with meals) 180 tablet 3     cetirizine (ZYRTEC) 10 MG tablet Take 1 tablet (10 mg) by mouth daily (Patient not taking: Reported on 8/3/2021) 90 tablet 0     clopidogrel (PLAVIX) 75 MG tablet Take 1 tablet (75 mg) by mouth daily 90 tablet 3     Continuous Blood Gluc  (Social InsightSTYLE CLARITA 2 READER SYSTM) DRAGAN 1 Device daily 1 each 3     Continuous Blood Gluc Sensor (FREESTYLE CLARITA 2 SENSOR SYSTM) MISC 1 Units 4 times daily (before meals and nightly) 1 each 3     ferrous sulfate (FEROSUL) 325 (65 Fe) MG tablet Take 1 tablet (325 mg) by mouth daily (with breakfast) (Patient not taking: Reported on 8/3/2021) 30 tablet 3     furosemide (LASIX) 40 MG tablet Take 1 tablet (40 mg) in the morning and 1/2 tablet (20 mg) in the afternoon 180 tablet 3     gabapentin (NEURONTIN) 300 MG capsule TAKE 2-3 CAPSULES (600-900 MG) BY MOUTH AT BEDTIME 180 capsule 1     insulin aspart (NOVOLOG FLEXPEN) 100 UNIT/ML pen Inject Subcutaneous 3 times daily (with meals) Sliding Scale  Do not give sliding scale insulin if Pre-Meal BG less than 140.   For Pre-Meal:   - 200 give 2 units.    - 250 give 4 units.    - 300 give 6 units.    - 350 give 8 units.    - 400 give 10 units.       insulin aspart (NOVOLOG PEN) 100 UNIT/ML pen Inject 12 Units Subcutaneous 2 times daily (with meals) With breakfast and lunch       insulin aspart (NOVOLOG PEN) 100 UNIT/ML pen Inject 14  "Units Subcutaneous daily (with dinner)       insulin glargine (LANTUS PEN) 100 UNIT/ML pen Inject 44 units subcutaneously once every morning       insulin pen needle (31G X 6 MM) 31G X 6 MM miscellaneous Use  as directed. 100 each 11     isosorbide mononitrate (IMDUR) 60 MG 24 hr tablet Take 1 tablet (60 mg) by mouth daily 90 tablet 0     levothyroxine (SYNTHROID, LEVOTHROID) 137 MCG tablet Take 1 tablet by mouth once every evening 90 tablet 3     lisinopril (ZESTRIL) 2.5 MG tablet Take 1 tablet (2.5 mg) by mouth daily 90 tablet 0     nitroGLYcerin (NITROSTAT) 0.4 MG sublingual tablet For chest pain place 1 tablet under the tongue every 5 minutes for 3 doses. If symptoms persist 5 minutes after 1st dose call 911. 15 tablet 3     pantoprazole (PROTONIX) 40 MG EC tablet TAKE 1 TABLET BY MOUTH EVERY MORNING BEFORE BREAKFAST 90 tablet 1     potassium chloride ER (KLOR-CON M) 20 MEQ CR tablet Take 1 tablet (20 mEq) by mouth daily (Patient not taking: Reported on 6/24/2021) 90 tablet 3     triamcinolone (KENALOG) 0.1 % external cream Apply topically 2 times daily 30 g 0     Vitamin D, Cholecalciferol, 25 MCG (1000 UT) TABS Take 1 tablet by mouth once daily           EXAM:    Vitals: /70   Ht 1.93 m (6' 4\")   Wt 122.9 kg (271 lb)   BMI 32.99 kg/m    BMI: Body mass index is 32.99 kg/m .    Constitutional:  Jayro Elder is in no apparent distress, appears well-nourished. Cooperative with history and physical exam.     Vascular:  Pedal pulses are palpable for both the DP and PT arteries.  CFT < 3 sec.  No edema.       Neuro: Light touch sensation is diminished, bilateral foot,  to the L4, L5, S1 distributions      Derm: Prolific hyperkeratotic lesion subleft first metatarsal head.  There is hyperpigmentation indicative of intraepidermal bleeding.    Post debridement, there is only superficial ulceration to the depth of deeper skin.  Associated erythema, edema or purulence.    Musculoskeletal:    Lower extremity " muscle strength is normal. No gross deformities.  Partial amputations of the left third and fourth toe.            Again, thank you for allowing me to participate in the care of your patient.        Sincerely,        Kwasi Root DPM

## 2021-08-18 NOTE — PROGRESS NOTES
"ASSESSMENT:  Encounter Diagnoses   Name Primary?     Callus of foot Yes     Dry skin      Ulcer of left foot, limited to breakdown of skin (H)      Diabetic polyneuropathy associated with type 2 diabetes mellitus (H)      Type 2 diabetes mellitus with diabetic peripheral angiopathy without gangrene, with long-term current use of insulin (H)      MEDICAL DECISION MAKING:  This ulceration is chronic with the underlying contributing chronic conditions of diabetes with peripheral neuropathy.  This is nearly healed.  Anticipate with the removal of the hyperkeratotic skin, this will heal shortly.     There are no clinical signs of infection today    He is to cleanse the area daily, blot dry, apply a topical antibiotic and light dressing.  He will continue with the  2 shoe for offloading.  Follow-up in 1 month.    There is dry scaly hyperkeratotic skin around the area of ulceration.  For this I recommend use a pumice stone.  I also prescribed AmLactin 12% cream.      Excisional Debridement    The excisional debridement procedure was discussed.  This included the goals of removing non-viable tissue, evaluating the full extent of wound, and promoting wound healing.  Jayro Elder  provided verba consent.  The \"Time Out\" was called, confirming procedure and location.     Using a sterile #15 blade and tissue nippers, excisional debridment of the left foot ulcer was performed. The hyperkeratotic eschar surrounding the wound was removed, by excising skin edges back to healthy, bleeding tissue. Non-viable tissue was excised.  Debridement was carried out to the depth of deeper skin. The ulcer base was scraped to remove bioburden and promote healing.  The area debrided was less than 20 square cm.   There was moderate bleeding with the procedure. No anesthesia was needed due to peripheral neuropathy.   A sterile dressing was applied.    Disclaimer: This note consists of symbols derived from keyboarding, dictation and/or voice " recognition software. As a result, there may be errors in the script that have gone undetected. Please consider this when interpreting information found in this chart.    Kwasi Root, BRICE, FACFAS, MS    [place] Department of Podiatry/Foot & Ankle Surgery      ____________________________________________________________________    HPI:       Jayro follows up for a preulcerative callus and an area of recurrent ulceration left foot.  This causes him intermittent pain.  He has type 2 diabetes and peripheral neuropathy.  He is on Eliquis and Plavix.    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     Cardiomyopathy (H)      Cellulitis of left lower extremity 3/13/2018     Cerebral infarction (H)     eye sight decreased in peripheral of right side and blurry     Cerebral infarction (H) 2016     Diabetic ulcer of left midfoot associated with type 2 diabetes mellitus, with fat layer exposed (H) 3/13/2018     Essential hypertension 11/13/2002     Essential hypertension, benign 11/13/2002     Generalized osteoarthrosis, unspecified site 11/13/2002     Microalbuminuria 3/13/2018    X1     Myocardial infarction (H)      Myocardial infarction (H) 06/28/2005     Neuropathy      Neuropathy 2016     Sepsis (H)      Sleep apnea     doesn't use it     Sleep apnea 2006     Substance abuse (H)      Substance abuse (H)      Syncope, unspecified syncope type 3/13/2018     Syncope, unspecified syncope type 03/13/2018     Thyroid disease     takes medicine     Thyroid disease 2005     Tobacco use disorder 11/13/2002     Type 2 diabetes mellitus (H) 2000     Type 2 diabetes mellitus with diabetic peripheral angiopathy without gangrene, unspecified long term insulin use status 2005   *  *  Past Surgical History:   Procedure Laterality Date     AMPUTATE TOE(S)  Right 1/6/2019    Procedure: Right open second toe partial amputation;  Surgeon: Sabino Garcia DPM;  Location: [facility] OR     AMPUTATE TOE(S) Right 1/8/2019    Procedure: 1.  Revision right second toe amputation with resection of distal half of proximal phalanx with full closure.    2.  Debridement of callus/preulcerative lesion, distal left second toe and plantar left first metatarsophalangeal joint.;  Surgeon: Ryan Bhagat DPM;  Location: [facility] OR     AMPUTATE TOE(S) Right 3/12/2019    Procedure: Partial right fourth toe amputation.;  Surgeon: Ryan Bhagat DPM;  Location: [facility] OR     AMPUTATE TOE(S) Right 5/6/2019    Procedure: Right partial third toe amputation;  Surgeon: Татьяна Mckeon DPM, Podiatry/Foot and Ankle Surgery;  Location: [facility] OR     AMPUTATE TOE(S) Left 4/18/2020    Procedure: LEFT SECOND TOE AMPUTATION;  Surgeon: Ryan Bhagat DPM;  Location: [facility] OR     AMPUTATE TOE(S) Left 5/8/2021    Procedure: 1.  Amputation of the left third toe at the level of the proximal interphalangeal joint. 2.  Excisional debridement of less than 20 square cm down to the level of the deeper skin, plantar left foot.;  Surgeon: Kwasi Root DPM;  Location: [facility] OR     AMPUTATE TOE(S) Right 2019    4 toes amputation      ANGIOGRAM  6/29/05    subtotal occ.mid LAD,SUSAN mid LAD     ANGIOGRAM  7/05    mild CAD,patent stent,no flow-limiting lesions,sev.LV dysf.LAD enlarged     ANGIOGRAM  2/09    Sev.single vessel disease,Mod LV dysf.distal LAD 90%,70-75% mid lad just before prev stent,SUSAN to prox.mid LAD, endeavor to distal LAD     ANGIOGRAM  11/13/13    restenosis, stent LAD     BACK SURGERY      back surgery x3     CARDIAC CATHETERIZATION  07/08/2019     CARDIAC CATHETERIZATION Left 06/13/2017     CARDIAC CATHETERIZATION  2005,2009,20013     CORONARY STENT PLACEMENT  07/08/2019     CV CORONARY ANGIOGRAM N/A 7/8/2019    Procedure: Coronary Angiogram;  Surgeon: Jose Alfredo Crockett MD;  Location:  HEART  CARDIAC CATH LAB     CV CORONARY ANGIOGRAM N/A 4/9/2021    Procedure: CV CORONARY ANGIOGRAM;  Surgeon: Warren Paulson MD;  Location: [facility]ARD[facility]     CV LEFT HEART CATH N/A 7/8/2019    Procedure: Left Heart Cath;  Surgeon: Jose Alfredo Crockett MD;  Location:  HEART CARDIAC CATH LAB     CV PCI ASPIRATION THROMECTOMY N/A 7/8/2019    Procedure: PCI Aspiration Thrombectomy;  Surgeon: Jose Alfredo Crockett MD;  Location: [facility]ARD[facility]     CV PCI STENT DRUG ELUTING N/A 7/8/2019    Procedure: PCI Stent Drug Eluting;  Surgeon: Jose Alfredo Crockett MD;  Location: [facility] CARDIAC C[facility] LAB     HEART CATH LEFT HEART CATH  06/13/2017    SUSAN to OM3     INCISION AND DRAINAGE TOE, COMBINED Bilateral 9/11/2018    Procedure: COMBINED INCISION AND DRAINAGE TOE;  1) Irrigation and debridement ulcer left great toe  2) Amputation 3rd toe right foot at distal interphalangeal joint level;  Surgeon: Miki Harp MD;  Location:  OR     IRRIGATION AND DEBRIDEMENT FOOT, COMBINED Left 3/12/2019    Procedure: Debridement of left foot ulcer sub first MPJ 2 x 2.5 cm down to and including subcutaneous tissue, callus debridement for preulcerative lesion, left second toe.;  Surgeon: Ryan Bhagat DPM;  Location:  OR     ORTHOPEDIC SURGERY      bunion surgery both feet     ORTHOPEDIC SURGERY      left shoulder     OTHER SURGICAL HISTORY  9 years old    cancer tumor mouth     SHOULDER ARTHROSCOPY W/ ROTATOR CUFF REPAIR Left 2004     SOFT TISSUE SURGERY      debridement of toe numerous time     VASCULAR SURGERY      7 stents in heart     VASCULAR SURGERY       [facility] NONSPECIFIC PROCEDURE      Laminectomy x 3 - (1983 x 2 & 1990)     [facility] NONSPECIFIC PROCEDURE Bilateral 1998    Bunionectomy     [facility] NONSPECIFIC PROCEDURE  1959    Gingival surgery at age 9   *  *  Current Outpatient Medications   Medication Sig Dispense Refill     ammonium lactate (AMLACTIN) 12 % external cream Apply topically 2 times daily 385 g  3     apixaban ANTICOAGULANT (ELIQUIS) 5 MG tablet Take 1 tablet (5 mg) by mouth 2 times daily       atorvastatin (LIPITOR) 40 MG tablet Take 1 tablet (40 mg) by mouth every evening 90 tablet 3     carvedilol (COREG) 25 MG tablet Take 1 tablet (25 mg) by mouth 2 times daily (with meals) 180 tablet 3     cetirizine (ZYRTEC) 10 MG tablet Take 1 tablet (10 mg) by mouth daily (Patient not taking: Reported on 8/3/2021) 90 tablet 0     clopidogrel (PLAVIX) 75 MG tablet Take 1 tablet (75 mg) by mouth daily 90 tablet 3     Continuous Blood Gluc  (FREESTYLE CLARITA 2 READER SYSTM) DRAGAN 1 Device daily 1 each 3     Continuous Blood Gluc Sensor (FREESTYLE CLARITA 2 SENSOR SYSTM) MISC 1 Units 4 times daily (before meals and nightly) 1 each 3     ferrous sulfate (FEROSUL) 325 (65 Fe) MG tablet Take 1 tablet (325 mg) by mouth daily (with breakfast) (Patient not taking: Reported on 8/3/2021) 30 tablet 3     furosemide (LASIX) 40 MG tablet Take 1 tablet (40 mg) in the morning and 1/2 tablet (20 mg) in the afternoon 180 tablet 3     gabapentin (NEURONTIN) 300 MG capsule TAKE 2-3 CAPSULES (600-900 MG) BY MOUTH AT BEDTIME 180 capsule 1     insulin aspart (NOVOLOG FLEXPEN) 100 UNIT/ML pen Inject Subcutaneous 3 times daily (with meals) Sliding Scale  Do not give sliding scale insulin if Pre-Meal BG less than 140.   For Pre-Meal:   - 200 give 2 units.    - 250 give 4 units.    - 300 give 6 units.    - 350 give 8 units.    - 400 give 10 units.       insulin aspart (NOVOLOG PEN) 100 UNIT/ML pen Inject 12 Units Subcutaneous 2 times daily (with meals) With breakfast and lunch       insulin aspart (NOVOLOG PEN) 100 UNIT/ML pen Inject 14 Units Subcutaneous daily (with dinner)       insulin glargine (LANTUS PEN) 100 UNIT/ML pen Inject 44 units subcutaneously once every morning       insulin pen needle (31G X 6 MM) 31G X 6 MM miscellaneous Use  as directed. 100 each 11     isosorbide mononitrate (IMDUR) 60 MG 24  "hr tablet Take 1 tablet (60 mg) by mouth daily 90 tablet 0     levothyroxine (SYNTHROID, LEVOTHROID) 137 MCG tablet Take 1 tablet by mouth once every evening 90 tablet 3     lisinopril (ZESTRIL) 2.5 MG tablet Take 1 tablet (2.5 mg) by mouth daily 90 tablet 0     nitroGLYcerin (NITROSTAT) 0.4 MG sublingual tablet For chest pain place 1 tablet under the tongue every 5 minutes for 3 doses. If symptoms persist 5 minutes after 1st dose call 911. 15 tablet 3     pantoprazole (PROTONIX) 40 MG EC tablet TAKE 1 TABLET BY MOUTH EVERY MORNING BEFORE BREAKFAST 90 tablet 1     potassium chloride ER (KLOR-CON M) 20 MEQ CR tablet Take 1 tablet (20 mEq) by mouth daily (Patient not taking: Reported on 6/24/2021) 90 tablet 3     triamcinolone (KENALOG) 0.1 % external cream Apply topically 2 times daily 30 g 0     Vitamin D, Cholecalciferol, 25 MCG (1000 UT) TABS Take 1 tablet by mouth once daily           EXAM:    Vitals: /70   Ht 1.93 m (6' 4\")   Wt 122.9 kg (271 lb)   BMI 32.99 kg/m    BMI: Body mass index is 32.99 kg/m .    Constitutional:  Jayro Elder is in no apparent distress, appears well-nourished. Cooperative with history and physical exam.     Vascular:  Pedal pulses are palpable for both the DP and PT arteries.  CFT < 3 sec.  No edema.       Neuro: Light touch sensation is diminished, bilateral foot,  to the L4, L5, S1 distributions      Derm: Prolific hyperkeratotic lesion subleft first metatarsal head.  There is hyperpigmentation indicative of intraepidermal bleeding.    Post debridement, there is only superficial ulceration to the depth of deeper skin.  Associated erythema, edema or purulence.    Musculoskeletal:    Lower extremity muscle strength is normal. No gross deformities.  Partial amputations of the left third and fourth toe.        "

## 2021-08-18 NOTE — PATIENT INSTRUCTIONS
Thank you for choosing [facility] Podiatry / Foot & Ankle Surgery!    DR. VÁSQUEZ'S CLINIC LOCATIONS     [facility] SCHEDULE SURGERY: [phone]   [address] [place] [address] APPOINTMENTS: [phone]   [place] [address] BILLING QUESTIONS: [phone]  [phone] RADIOLOGY: [phone]       [address]  [address]    [place], [place] [id number] [phone]  [phone]      Cream sent to pharmacy    Wound Care Recommendations:    1)  Keep the wound covered by a bandage when bathing.    2)  Gently clean the wound with soap water, separate from bath/shower water.      3)  Each day, apply a topical antibiotic ointment to the wound (Neosporin, Triple antibiotic, Bacitracin).   Cover with large band-aid or gauze.      5)  Please seek immediate medical attention if any increasing redness, drainage, smell, or pain related to the wound.     6)  Please return to clinic in the period of time requested by Dr. Vásquez.

## 2021-08-20 ENCOUNTER — CARE COORDINATION (OUTPATIENT)
Dept: CARDIOLOGY | Facility: CLINIC | Age: 71
End: 2021-08-20

## 2021-08-20 NOTE — PROGRESS NOTES
[facility] MAT[facility]W        Jayro is currently taking Apixaban and Clopidogrel. He reports he is having an Eczema flare-up. He is scratching his skin and it bleeds and scabs over. He does not have any uncontrolled bleeding at this time. He would like to know if he should stop taking one of his blood thinners.    He is also going to have a broken tooth extracted within the next couple of weeks and wants to know if he should stop taking either of his blood thinners to have this done.    Will route to Vazquez Christensen CNP for review.     Rosio Estrella RN  Care Coordinator  [facility].   JOHNATHANOCitlalyRCANDACE. Nurse Line: [phone]  / Personal Line: [phone]  08/20/21 3:22 PM

## 2021-08-23 NOTE — PROGRESS NOTES
He should continue on Eliquis for stroke prevention with his history of A.Fib and high [id number] score (stroke risk). He is on plavix for his history of coronary artery disease with significant stenting in the past. His bleeding issues do not sound serious. He should check in with his PCP/dermatoligist if he feels he is having a flare up of eczema.     Regarding the tooth extraction he will be having, he can hold his eliquis for 2 days beforehand and would restart as soon as the dentist/oral surgeon feels it is safe to. I would continue plavix uninterrupted if they are okay with it.     Thank you!    LORRAINE De Anda, CNP

## 2021-08-23 NOTE — PROGRESS NOTES
[facility] MAT[facility]W        Spoke with Jayro and gave him Vazquez's instructions. He stated understanding. Asked him who his dentist is, [facility] in [place]. Faxed Eliquis and Plavix instructions to [phone].          Rosio Estrella, RN  Care Coordinator  [facility] MATTHEW RECINOS.OCitlalyRCANDACE. Nurse Line: [phone]  / Personal Line: [phone]  08/23/21 3:57 PM

## 2021-09-14 DIAGNOSIS — R21 RASH AND NONSPECIFIC SKIN ERUPTION: ICD-10-CM

## 2021-09-15 ENCOUNTER — TELEPHONE (OUTPATIENT)
Dept: FAMILY MEDICINE | Facility: CLINIC | Age: 71
End: 2021-09-15

## 2021-09-15 NOTE — TELEPHONE ENCOUNTER
Pt calling asking for antibiotics for dental work on Saturday. He is having 3 teeth removed. Pt says he has 10 heart stents. Adv a dentist can order that- he again says they told me to ask PCP.     Adv DM is out of the office today.     Mary Jo FLORES RN

## 2021-09-15 NOTE — TELEPHONE ENCOUNTER
There are no guidelines to recommend pre-dental antibiotics for stents. If he had an artificial heart valve it would be a different story. If the dentist thinks they are needed they can prescribe them.

## 2021-09-16 ENCOUNTER — CARE COORDINATION (OUTPATIENT)
Dept: CARDIOLOGY | Facility: CLINIC | Age: 71
End: 2021-09-16

## 2021-09-16 RX ORDER — CETIRIZINE HYDROCHLORIDE 10 MG/1
TABLET ORAL
Qty: 90 TABLET | Refills: 0 | Status: SHIPPED | OUTPATIENT
Start: 2021-09-16 | End: 2021-12-27

## 2021-09-16 NOTE — PROGRESS NOTES
VM from pt stating he is getting 3 teeth extracted this weekend and needs premedication with antibiotics, he has had 10 stents.     Reviewed chart, problem list, history, and surgical hx. Per AHA guidelines, pt does not meet criteria for requiring pre-antibiotics for dental work. Current guidelines show pre-medication with antibiotics for patients with hx of prosthetic cardiac valve/prosthetic material used in valve repair, previous endocarditis, Congenital heart disease, or cardiac transplantation recipients with cardiac valvular disease.     Called pt, reviewed above and that he does not require premedication. Pt reports dental office told him to call to get it. Told pt he does not require it for his heart, if dental office wants antibiotics prescribed for something else, I.e. tooth infection, they need to prescribe it. Pt stated understanding.     Ivet Olivares RN, BSN  09/16/21 at 9:30 AM

## 2021-09-16 NOTE — TELEPHONE ENCOUNTER
Routing refill request to provider for review/approval because:  Antihistamines Protocol [id number]/14/2021 12:31 PM   Patient is 3-64 years of age     Melissa Hewitt RN

## 2021-09-18 DIAGNOSIS — K21.9 GASTROESOPHAGEAL REFLUX DISEASE: ICD-10-CM

## 2021-09-21 RX ORDER — PANTOPRAZOLE SODIUM 40 MG/1
TABLET, DELAYED RELEASE ORAL
Qty: 90 TABLET | Refills: 1 | Status: SHIPPED | OUTPATIENT
Start: 2021-09-21 | End: 2022-01-01

## 2021-09-21 NOTE — TELEPHONE ENCOUNTER
Prescription approved per [facility] Refill Protocol.  Pantoprazole    Mary Jo FLORES RN

## 2021-10-13 ENCOUNTER — OFFICE VISIT (OUTPATIENT)
Dept: PODIATRY | Facility: CLINIC | Age: 71
End: 2021-10-13
Payer: COMMERCIAL

## 2021-10-13 VITALS
HEIGHT: 76 IN | BODY MASS INDEX: 33 KG/M2 | DIASTOLIC BLOOD PRESSURE: 76 MMHG | WEIGHT: 271 LBS | SYSTOLIC BLOOD PRESSURE: 116 MMHG

## 2021-10-13 DIAGNOSIS — L84 PRE-ULCERATIVE CALLUSES: ICD-10-CM

## 2021-10-13 DIAGNOSIS — E11.42 DIABETIC POLYNEUROPATHY ASSOCIATED WITH TYPE 2 DIABETES MELLITUS (H): Primary | ICD-10-CM

## 2021-10-13 DIAGNOSIS — L97.521 ULCER OF LEFT FOOT, LIMITED TO BREAKDOWN OF SKIN (H): ICD-10-CM

## 2021-10-13 PROCEDURE — 97597 DBRDMT OPN WND 1ST 20 CM/<: CPT | Performed by: PODIATRIST

## 2021-10-13 PROCEDURE — 99213 OFFICE O/P EST LOW 20 MIN: CPT | Mod: 25 | Performed by: PODIATRIST

## 2021-10-13 ASSESSMENT — MIFFLIN-ST. JEOR: SCORE: 2085.75

## 2021-10-13 NOTE — PROGRESS NOTES
"Podiatry / Foot and Ankle Surgery Progress Note    October 13, 2021    Subject: Patient was seen for follow up on chronic left foot ulceration.  Notes that it was doing well for a while and then got worse 5 days ago.  Notes that he has only been wearing his offloading shoe around.  Denies fever, nausea, vomiting.  No pain with his baseline neuropathy.    Objective:  Vitals: /76   Ht 1.93 m (6' 4\")   Wt 122.9 kg (271 lb)   BMI 32.99 kg/m      A1C8.1: (4/2021)    General:  Patient is alert and orientated.  NAD.    Vascular:  DP and PT pulses are faintly palpable.  No edema or varicosities noted.  CFT's < 3secs.  Skin temp is normal.    Neuro:  Light and gross touch sensation absent to feet.    Derm:  Prolific hyperkeratotic lesion subleft first metatarsal head.  There is hyperpigmentation indicative of intraepidermal bleeding.  There is a proximal fissure.  With excisional debridement this reveals a 3.5cm x 3.0cm x 0.1cm ulceration to the depth of deeper skin no subcutaneous tissue or bone noted..    Musculoskeletal:  Partial amputations of the left third and fourth toe.       Assessment:    Diabetic polyneuropathy associated with type 2 diabetes mellitus (H)  Pre-ulcerative calluses  Ulcer of left foot, limited to breakdown of skin (H)      Medical Decision Making/Plan: At this time the wound was debrided please see procedure note below.  Again reiterated the importance of pressure off of the area.  That is why it got a little more worse today because he notes he was not wearing his offloading shoe around the house.  Discussed the need to wear this at all times.  Did discuss that the graft is not a great option for him as this area is an area of weightbearing and he would need to be nonweightbearing for 6 to 8 weeks and he notes that he can really do that.  We will have him follow-up in 1 month for reassessment.  He will continue to apply iodine and a Band-Aid to the area daily.  All questions were answered " to patient satisfaction and he will call for questions or concerns.    Procedure: After verbal consent, excisional debridement was performed on ulcer.  #15 blade was used to debride ulcer down to and including skin. Bleeding controlled with light pressure.   No drainage noted.  No anesthesia was used due to neuropathy. Dry dressing applied to foot.  Patient tolerated procedure well.      Patient Risk Factor:  Patient is a medium risk factor for infection.     Татьяна Mckeon DPM, Podiatry/Foot and Ankle Surgery

## 2021-10-13 NOTE — PATIENT INSTRUCTIONS
Thank you for choosing [facility] Podiatry / Foot & Ankle Surgery!    DR. ASENCIO'S CLINIC:  [facility]   [address]   [address]   [place] [address]   [phone]  [phone]      SCHEDULE SURGERY: [phone]   BILLING QUESTIONS: [phone]   APPOINTMENTS: [phone]   CONSUMER PRICE LINE: [phone]      Follow up: 1 month

## 2021-10-13 NOTE — LETTER
"    10/13/2021         RE: Jayro Elder  [address] [place] [address]        Dear Colleague,    Thank you for referring your patient, Jayro Elder, to the [facility] PODIATRY. Please see a copy of my visit note below.    Podiatry / Foot and Ankle Surgery Progress Note    October 13, 2021    Subject: Patient was seen for follow up on chronic left foot ulceration.  Notes that it was doing well for a while and then got worse 5 days ago.  Notes that he has only been wearing his offloading shoe around.  Denies fever, nausea, vomiting.  No pain with his baseline neuropathy.    Objective:  Vitals: /76   Ht 1.93 m (6' 4\")   Wt 122.9 kg (271 lb)   BMI 32.99 kg/m      A1C8.1: (4/2021)    General:  Patient is alert and orientated.  NAD.    Vascular:  DP and PT pulses are faintly palpable.  No edema or varicosities noted.  CFT's < 3secs.  Skin temp is normal.    Neuro:  Light and gross touch sensation absent to feet.    Derm:  Prolific hyperkeratotic lesion subleft first metatarsal head.  There is hyperpigmentation indicative of intraepidermal bleeding.  There is a proximal fissure.  With excisional debridement this reveals a 3.5cm x 3.0cm x 0.1cm ulceration to the depth of deeper skin no subcutaneous tissue or bone noted..    Musculoskeletal:  Partial amputations of the left third and fourth toe.       Assessment:    Diabetic polyneuropathy associated with type 2 diabetes mellitus (H)  Pre-ulcerative calluses  Ulcer of left foot, limited to breakdown of skin (H)      Medical Decision Making/Plan: At this time the wound was debrided please see procedure note below.  Again reiterated the importance of pressure off of the area.  That is why it got a little more worse today because he notes he was not wearing his offloading shoe around the house.  Discussed the need to wear this at all times.  Did discuss that the graft is not a great option for him as this area is an area " of weightbearing and he would need to be nonweightbearing for 6 to 8 weeks and he notes that he can really do that.  We will have him follow-up in 1 month for reassessment.  He will continue to apply iodine and a Band-Aid to the area daily.  All questions were answered to patient satisfaction and he will call for questions or concerns.    Procedure: After verbal consent, excisional debridement was performed on ulcer.  #15 blade was used to debride ulcer down to and including skin. Bleeding controlled with light pressure.   No drainage noted.  No anesthesia was used due to neuropathy. Dry dressing applied to foot.  Patient tolerated procedure well.      Patient Risk Factor:  Patient is a medium risk factor for infection.     Татьяна Mckeon DPM, Podiatry/Foot and Ankle Surgery        Again, thank you for allowing me to participate in the care of your patient.        Sincerely,        Татьяна Mckeon DPM, Podiatry/Foot and Ankle Surgery

## 2021-10-14 ENCOUNTER — TELEPHONE (OUTPATIENT)
Dept: FAMILY MEDICINE | Facility: CLINIC | Age: 71
End: 2021-10-14

## 2021-10-14 DIAGNOSIS — E11.621 DIABETIC FOOT ULCER ASSOCIATED WITH TYPE 2 DIABETES MELLITUS, UNSPECIFIED LATERALITY, UNSPECIFIED PART OF FOOT, UNSPECIFIED ULCER STAGE (H): ICD-10-CM

## 2021-10-14 DIAGNOSIS — Z89.421 H/O AMPUTATION OF LESSER TOE, RIGHT (H): ICD-10-CM

## 2021-10-14 DIAGNOSIS — L97.509 DIABETIC FOOT ULCER ASSOCIATED WITH TYPE 2 DIABETES MELLITUS, UNSPECIFIED LATERALITY, UNSPECIFIED PART OF FOOT, UNSPECIFIED ULCER STAGE (H): ICD-10-CM

## 2021-10-14 DIAGNOSIS — Z89.422 H/O AMPUTATION OF LESSER TOE, LEFT (H): ICD-10-CM

## 2021-10-14 DIAGNOSIS — E11.42 DIABETIC POLYNEUROPATHY ASSOCIATED WITH TYPE 2 DIABETES MELLITUS (H): Primary | ICD-10-CM

## 2021-10-14 NOTE — TELEPHONE ENCOUNTER
Patient requesting  a referral to [facility] to see a vascular doctor  about his foot.     Please fax referral to [phone]      Seble Mccain

## 2021-10-18 NOTE — TELEPHONE ENCOUNTER
Called patient and informed of below.     Patient stated he is not getting any place from the doctors here. It has been over 4 years of him dealing with this. He stated he felt like podiatry was not helpful and he does not want to see them again here or any other providers. He wanted a second opinion from [facility]. When he called [facility] to get a podiatry appt they told him he should be seen by vascular for this issue and not podiatry. He would like a referral to [facility] for this.     Please advise.     Laya Yo RN on 10/18/2021 at 1:21 PM

## 2021-10-25 DIAGNOSIS — I25.5 ISCHEMIC CARDIOMYOPATHY: ICD-10-CM

## 2021-10-25 RX ORDER — ISOSORBIDE MONONITRATE 60 MG/1
60 TABLET, EXTENDED RELEASE ORAL DAILY
Qty: 90 TABLET | Refills: 3 | Status: SHIPPED | OUTPATIENT
Start: 2021-10-25 | End: 2021-11-04 | Stop reason: SINTOL

## 2021-10-25 NOTE — TELEPHONE ENCOUNTER
Medication Refilled: Isosorbide  Last office visit: 8/3/2021 with Vazquez Christensen CNP  Last Labs/EKG: NA  Next office visit: 2/2022 orders in epic   Pharmacy sent to: [facility] RN

## 2021-11-03 ENCOUNTER — OFFICE VISIT (OUTPATIENT)
Dept: PODIATRY | Facility: CLINIC | Age: 71
End: 2021-11-03
Payer: COMMERCIAL

## 2021-11-03 VITALS — SYSTOLIC BLOOD PRESSURE: 112 MMHG | DIASTOLIC BLOOD PRESSURE: 70 MMHG | HEART RATE: 76 BPM | OXYGEN SATURATION: 98 %

## 2021-11-03 DIAGNOSIS — E11.42 DIABETIC POLYNEUROPATHY ASSOCIATED WITH TYPE 2 DIABETES MELLITUS (H): Primary | ICD-10-CM

## 2021-11-03 DIAGNOSIS — L97.522 CHRONIC FOOT ULCER WITH FAT LAYER EXPOSED, LEFT (H): ICD-10-CM

## 2021-11-03 PROCEDURE — 97597 DBRDMT OPN WND 1ST 20 CM/<: CPT | Performed by: PODIATRIST

## 2021-11-03 NOTE — LETTER
11/3/2021         RE: Jayro Elder  [address] [place] [address]        Dear Colleague,    Thank you for referring your patient, Jayro Elder, to the [facility] PODIATRY. Please see a copy of my visit note below.    Podiatry / Foot and Ankle Surgery Progress Note    November 3, 2021    Subject: Patient was seen for chronic left foot ulceration.  He has been wearing his offloading shoe he states all the time.  Denies fever, nausea, vomiting.  Hasn't noticed much drainage.    Objective:  Vitals: /70   Pulse 76   SpO2 98%   BMI= There is no height or weight on file to calculate BMI.    A1C8.1: (4/2021)     General:  Patient is alert and orientated.  NAD.     Vascular:  DP and PT pulses are faintly palpable.  No edema or varicosities noted.  CFT's < 3secs.  Skin temp is normal.     Neuro:  Light and gross touch sensation absent to feet.     Derm:  Prolific hyperkeratotic lesion subleft first metatarsal head.  There is hyperpigmentation indicative of intraepidermal bleeding.  There is a proximal fissure.  With excisional debridement this reveals a 1.0cm x 0.3cm x 0.1cm ulceration to the depth of deeper skin no subcutaneous tissue or bone noted..     Musculoskeletal:  Partial amputations of the left third and fourth toe.         Assessment:    Diabetic polyneuropathy associated with type 2 diabetes mellitus (H)  Pre-ulcerative calluses  Ulcer of left foot, limited to breakdown of skin (H)        Medical Decision Making/Plan: At this time the wound was debrided please see procedure note below.  Again reiterated the importance of pressure off of the area.    Continue to wear the offloading shoe at all times.  We discussed that he could go back into his diabetic shoes and inserts.   Did discuss that the graft is not a great option for him as this area is an area of weightbearing and he would need to be nonweightbearing for 6 to 8 weeks and he notes that he can not  really do that.  We will have him follow-up in 6 weeks for reassessment.  He will continue to apply iodine and a Band-Aid to the area daily.  All questions were answered to patient satisfaction and he will call for questions or concerns.     Procedure: After verbal consent, excisional debridement was performed on ulcer.  #15 blade was used to debride ulcer down to and including skin. Bleeding controlled with light pressure.   No drainage noted.  No anesthesia was used due to neuropathy. Dry dressing applied to foot.  Patient tolerated procedure well.        Patient Risk Factor:  Patient is a medium risk factor for infection.  Татьяна Mckeon DPM, Podiatry/Foot and Ankle Surgery        Again, thank you for allowing me to participate in the care of your patient.        Sincerely,        Татьяна Mckeon DPM, Podiatry/Foot and Ankle Surgery

## 2021-11-03 NOTE — PROGRESS NOTES
Podiatry / Foot and Ankle Surgery Progress Note    November 3, 2021    Subject: Patient was seen for chronic left foot ulceration.  He has been wearing his offloading shoe he states all the time.  Denies fever, nausea, vomiting.  Hasn't noticed much drainage.    Objective:  Vitals: /70   Pulse 76   SpO2 98%   BMI= There is no height or weight on file to calculate BMI.    A1C8.1: (4/2021)     General:  Patient is alert and orientated.  NAD.     Vascular:  DP and PT pulses are faintly palpable.  No edema or varicosities noted.  CFT's < 3secs.  Skin temp is normal.     Neuro:  Light and gross touch sensation absent to feet.     Derm:  Prolific hyperkeratotic lesion subleft first metatarsal head.  There is hyperpigmentation indicative of intraepidermal bleeding.  There is a proximal fissure.  With excisional debridement this reveals a 1.0cm x 0.3cm x 0.1cm ulceration to the depth of deeper skin no subcutaneous tissue or bone noted..     Musculoskeletal:  Partial amputations of the left third and fourth toe.         Assessment:    Diabetic polyneuropathy associated with type 2 diabetes mellitus (H)  Pre-ulcerative calluses  Ulcer of left foot, limited to breakdown of skin (H)        Medical Decision Making/Plan: At this time the wound was debrided please see procedure note below.  Again reiterated the importance of pressure off of the area.    Continue to wear the offloading shoe at all times.  We discussed that he could go back into his diabetic shoes and inserts.   Did discuss that the graft is not a great option for him as this area is an area of weightbearing and he would need to be nonweightbearing for 6 to 8 weeks and he notes that he can not really do that.  We will have him follow-up in 6 weeks for reassessment.  He will continue to apply iodine and a Band-Aid to the area daily.  All questions were answered to patient satisfaction and he will call for questions or concerns.     Procedure: After verbal  consent, excisional debridement was performed on ulcer.  #15 blade was used to debride ulcer down to and including skin. Bleeding controlled with light pressure.   No drainage noted.  No anesthesia was used due to neuropathy. Dry dressing applied to foot.  Patient tolerated procedure well.        Patient Risk Factor:  Patient is a medium risk factor for infection.  Татьяна Mckeon DPM, Podiatry/Foot and Ankle Surgery

## 2021-11-03 NOTE — PATIENT INSTRUCTIONS
Thank you for choosing [facility] Podiatry / Foot & Ankle Surgery!    DR. ASENCIO'S CLINIC:  [facility]   [address]   [address]   [place] [address]   [phone]  [phone]      SCHEDULE SURGERY: [phone]   BILLING QUESTIONS: [phone]   APPOINTMENTS: [phone]   RADIOLOGY: [phone]   CONSUMER PRICE LINE: [phone]      Follow up: 6 weeks      CALLUS / CORNS / IPKs  When there is excessive friction or pressure on the skin, the body responds by making the skin thicker to protect the deeper structures from becoming exposed. While this works well to protect the deeper structures, the thickened skin can increase pressure and pain.    CALLUS: Flat, diffuse thickening are simple calluses and they are usually caused by friction. Often these are the result of rubbing on a shoe or going barefoot.    CORNS: Calluses with a central core between the toes are called corns. These result from prominent joints on adjacent toes rubbing together. Theses are a symptom of bone malalignment and will always recur unless the underlying bones are addressed surgically.    IPKs: Calluses with a central core on the ball of the foot are usually IPKs (intractable plantar keratosis). These are caused by excessive pressure from the metatarsals, the bones that make up the ball of the foot. Often one of these bones is too long or too prominent.  Again, these will always recur unless the underlying bone issue is addressed. There is no cure for these. They will either go away by themselves, recur, or more could develop.    ROUTINE MAINTENANCE  1. File them down with a pumice stone or callus file a couple times a week.   2. An electric callus removing device. Amope Pedi Perfect Electronic Pedicure Foot File and Callus Remover can be a good option.   3. Lotion can be applied to soften the callus. A urea based cream such as Kersal or Vanicream or thicker cream with shea butter are good options.  4. Toe  spacers or toe covers can be used for corns, gel pads can be used for other lesions on the bottom of the foot.   If there is a surgical pathology noted, such as a prominent bone, often this needs to be addressed surgically to minimize recurrence. However, sometimes the lesion simply migrates to another spot after surgery, so it is not a guaranteed cure.     There was also discussion of the cost structure of callus care if they were to come back and have it treated in the clinic. Explained that if insurance does not cover it, they would be billed. This charge could range from $100 - $160.  They were also provided information on places to get the callus treatment.              Flu vaccines are now available at all [facility] clinics and retail pharmacies across the [facility]. Appointments are required for clinic locations. To schedule an appointment online, please log into [employer] or create an account if you are a new user. You can also call [phone], or simply walk in at one of the [facility] retail pharmacy locations.

## 2021-11-04 ENCOUNTER — CARE COORDINATION (OUTPATIENT)
Dept: CARDIOLOGY | Facility: CLINIC | Age: 71
End: 2021-11-04

## 2021-11-04 DIAGNOSIS — R07.9 CHEST PAIN, UNSPECIFIED TYPE: Primary | ICD-10-CM

## 2021-11-04 DIAGNOSIS — I50.23 ACUTE ON CHRONIC SYSTOLIC CONGESTIVE HEART FAILURE (H): ICD-10-CM

## 2021-11-04 DIAGNOSIS — I25.119 CORONARY ARTERY DISEASE INVOLVING NATIVE CORONARY ARTERY OF NATIVE HEART WITH ANGINA PECTORIS (H): ICD-10-CM

## 2021-11-04 RX ORDER — ISOSORBIDE MONONITRATE 30 MG/1
30 TABLET, EXTENDED RELEASE ORAL DAILY
Qty: 90 TABLET | Refills: 0 | Status: SHIPPED | OUTPATIENT
Start: 2021-11-04 | End: 2021-12-21

## 2021-11-04 NOTE — PROGRESS NOTES
New prescription for Imdur 30 mg once daily sent to pharmacy. Med list updated.    Princess Chapa RN 1:08 PM 11/04/21

## 2021-11-04 NOTE — PROGRESS NOTES
"Jayro left voice mail yesterday afternoon stating he cannot tolerate Imdur at 60 mg once daily and would like to go back to 30 mg daily.     Called Jayro. He states 60 mg makes him \"lightheaded and drops my blood pressure. I feel better on the 30 mg dose.\" Bps running upper 90s to low 100s. Occ SBP 80s    Recent changes:   - 8/3 - Lasix increased from 40 mg once daily to 40 mg in morning and 20 mg in afternoon. Talk of increasing Imdur to 60 mg from 30 mg once daily, pt prefered to stay at 30 mg once daily.     - 8/10 - Pt would like to try Imdur 60 mg once daily & Lisinopril 2.5 mg once daily (currently 5 mg once daily).   Imdur increased to 60 mg once daily & lisinopril decreased to 2.5 mg once daily.     Update to Vazquez    Future Appointments   Date Time Provider Department Center   12/15/2021 10:30 AM Татьяна Mckeon DPM, Podiatry/Foot and Ankle Surgery [facility] [place]     Princess Chapa RN 10:02 AM 11/04/21      "

## 2021-11-04 NOTE — PROGRESS NOTES
Thank you for the update. Okay to decrease Imdur back to 30 mg daily.  If his blood pressure stable on the lower dose of Imdur would consider increasing lisinopril back to 5 mg once daily.    Thank you!    V[facility]quez

## 2021-11-08 DIAGNOSIS — I25.5 ISCHEMIC CARDIOMYOPATHY: ICD-10-CM

## 2021-11-08 RX ORDER — LISINOPRIL 2.5 MG/1
2.5 TABLET ORAL DAILY
Qty: 90 TABLET | Refills: 3 | Status: SHIPPED | OUTPATIENT
Start: 2021-11-08 | End: 2022-01-01

## 2021-11-08 NOTE — TELEPHONE ENCOUNTER
Medication Refilled:Lisinopril   Last office visit: 8/3/2021 with Vazquez Christensen CNP  Last Labs/EKG: NA  Next office visit: 2/2022 orders in epic   Pharmacy sent to: [facility] RN

## 2021-11-10 DIAGNOSIS — I10 ESSENTIAL HYPERTENSION: ICD-10-CM

## 2021-11-10 DIAGNOSIS — I20.0 UNSTABLE ANGINA (H): ICD-10-CM

## 2021-11-10 RX ORDER — CLOPIDOGREL BISULFATE 75 MG/1
75 TABLET ORAL DAILY
Qty: 90 TABLET | Refills: 3 | Status: SHIPPED | OUTPATIENT
Start: 2021-11-10 | End: 2022-01-01

## 2021-11-10 RX ORDER — CARVEDILOL 25 MG/1
25 TABLET ORAL 2 TIMES DAILY WITH MEALS
Qty: 180 TABLET | Refills: 3 | Status: SHIPPED | OUTPATIENT
Start: 2021-11-10 | End: 2022-01-01

## 2021-11-30 ENCOUNTER — TRANSFERRED RECORDS (OUTPATIENT)
Dept: HEALTH INFORMATION MANAGEMENT | Facility: CLINIC | Age: 71
End: 2021-11-30
Payer: COMMERCIAL

## 2021-12-08 ENCOUNTER — TRANSFERRED RECORDS (OUTPATIENT)
Dept: HEALTH INFORMATION MANAGEMENT | Facility: CLINIC | Age: 71
End: 2021-12-08
Payer: COMMERCIAL

## 2021-12-14 ENCOUNTER — TELEPHONE (OUTPATIENT)
Dept: PODIATRY | Facility: CLINIC | Age: 71
End: 2021-12-14
Payer: COMMERCIAL

## 2021-12-14 NOTE — TELEPHONE ENCOUNTER
Received the following staff message:    Nicole Florian [facility] Podiatry Rn  Good Morning - this patient wanted Dr. Root to know that light therapy healed his wound.     Thanks!   -Bettye      Routing to provider as FYI only.     ROLANDA Bates RN

## 2021-12-17 ENCOUNTER — TELEPHONE (OUTPATIENT)
Dept: CARDIOLOGY | Facility: CLINIC | Age: 71
End: 2021-12-17
Payer: COMMERCIAL

## 2021-12-17 NOTE — TELEPHONE ENCOUNTER
Message was left yesterday on Triage voicemail at 4:44p from [id number] patient calling for symptoms. Patient was scheduled to see Vazquez Christensen 1/7/22 per  and she stated on message left for Triage, pt was wanting to be seen sooner. She requested patient be called . Returned call this morning at 8:09a, message was left on voicemail for patient to call back to discuss.

## 2021-12-21 DIAGNOSIS — I25.119 CORONARY ARTERY DISEASE INVOLVING NATIVE CORONARY ARTERY OF NATIVE HEART WITH ANGINA PECTORIS (H): ICD-10-CM

## 2021-12-21 DIAGNOSIS — R07.9 CHEST PAIN, UNSPECIFIED TYPE: ICD-10-CM

## 2021-12-21 DIAGNOSIS — I50.23 ACUTE ON CHRONIC SYSTOLIC CONGESTIVE HEART FAILURE (H): ICD-10-CM

## 2021-12-21 RX ORDER — ISOSORBIDE MONONITRATE 30 MG/1
30 TABLET, EXTENDED RELEASE ORAL DAILY
Qty: 90 TABLET | Refills: 3 | Status: ON HOLD | OUTPATIENT
Start: 2021-12-21 | End: 2021-12-29

## 2021-12-21 NOTE — TELEPHONE ENCOUNTER
Received refill request for:  Elainar  Last OV was: 8/3/2021 with APatnoe, CNP  Labs/EKG: na  F/U scheduled: 1/7/2022 with APatnoe, CNP  [facility] to: [facility]

## 2021-12-27 ENCOUNTER — APPOINTMENT (OUTPATIENT)
Dept: GENERAL RADIOLOGY | Facility: CLINIC | Age: 71
End: 2021-12-27
Attending: PHYSICIAN ASSISTANT
Payer: COMMERCIAL

## 2021-12-27 ENCOUNTER — HOSPITAL ENCOUNTER (OUTPATIENT)
Facility: CLINIC | Age: 71
Setting detail: OBSERVATION
Discharge: HOME | End: 2021-12-29
Attending: PHYSICIAN ASSISTANT | Admitting: INTERNAL MEDICINE
Payer: COMMERCIAL

## 2021-12-27 DIAGNOSIS — I25.5 ISCHEMIC CARDIOMYOPATHY: ICD-10-CM

## 2021-12-27 DIAGNOSIS — G47.30 SLEEP APNEA, UNSPECIFIED TYPE: ICD-10-CM

## 2021-12-27 DIAGNOSIS — R07.9 CHEST PAIN: ICD-10-CM

## 2021-12-27 DIAGNOSIS — R06.00 DYSPNEA: ICD-10-CM

## 2021-12-27 DIAGNOSIS — R42 LIGHTHEADEDNESS: ICD-10-CM

## 2021-12-27 DIAGNOSIS — N18.30 STAGE 3 CHRONIC KIDNEY DISEASE, UNSPECIFIED WHETHER STAGE 3A OR 3B CKD (H): ICD-10-CM

## 2021-12-27 DIAGNOSIS — I50.23 ACUTE ON CHRONIC SYSTOLIC CONGESTIVE HEART FAILURE (H): ICD-10-CM

## 2021-12-27 DIAGNOSIS — L03.032 CELLULITIS OF TOE OF LEFT FOOT: ICD-10-CM

## 2021-12-27 DIAGNOSIS — R53.1 WEAKNESS: ICD-10-CM

## 2021-12-27 DIAGNOSIS — I25.119 CORONARY ARTERY DISEASE INVOLVING NATIVE CORONARY ARTERY OF NATIVE HEART WITH ANGINA PECTORIS (H): Primary | ICD-10-CM

## 2021-12-27 LAB
ALBUMIN SERPL-MCNC: 3.4 G/DL (ref 3.4–5)
ALP SERPL-CCNC: 118 U/L (ref 40–150)
ALT SERPL W P-5'-P-CCNC: 38 U/L (ref 0–70)
ANION GAP SERPL CALCULATED.3IONS-SCNC: 6 MMOL/L (ref 3–14)
AST SERPL W P-5'-P-CCNC: 19 U/L (ref 0–45)
BASOPHILS # BLD AUTO: 0 10E3/UL (ref 0–0.2)
BASOPHILS NFR BLD AUTO: 0 %
BILIRUB SERPL-MCNC: 1.7 MG/DL (ref 0.2–1.3)
BUN SERPL-MCNC: 21 MG/DL (ref 7–30)
CALCIUM SERPL-MCNC: 8.3 MG/DL (ref 8.5–10.1)
CHLORIDE BLD-SCNC: 100 MMOL/L (ref 94–109)
CO2 SERPL-SCNC: 29 MMOL/L (ref 20–32)
CREAT SERPL-MCNC: 1.22 MG/DL (ref 0.66–1.25)
EOSINOPHIL # BLD AUTO: 0.1 10E3/UL (ref 0–0.7)
EOSINOPHIL NFR BLD AUTO: 1 %
ERYTHROCYTE [DISTWIDTH] IN BLOOD BY AUTOMATED COUNT: 13.8 % (ref 10–15)
FLUAV RNA SPEC QL NAA+PROBE: NEGATIVE
FLUBV RNA RESP QL NAA+PROBE: NEGATIVE
GFR SERPL CREATININE-BSD FRML MDRD: 63 ML/MIN/1.73M2
GLUCOSE BLD-MCNC: 212 MG/DL (ref 70–99)
GLUCOSE BLDC GLUCOMTR-MCNC: 204 MG/DL (ref 70–99)
HCT VFR BLD AUTO: 33.1 % (ref 40–53)
HGB BLD-MCNC: 10.9 G/DL (ref 13.3–17.7)
HOLD SPECIMEN: NORMAL
HOLD SPECIMEN: NORMAL
IMM GRANULOCYTES # BLD: 0 10E3/UL
IMM GRANULOCYTES NFR BLD: 0 %
LYMPHOCYTES # BLD AUTO: 0.7 10E3/UL (ref 0.8–5.3)
LYMPHOCYTES NFR BLD AUTO: 9 %
MCH RBC QN AUTO: 29.7 PG (ref 26.5–33)
MCHC RBC AUTO-ENTMCNC: 32.9 G/DL (ref 31.5–36.5)
MCV RBC AUTO: 90 FL (ref 78–100)
MONOCYTES # BLD AUTO: 0.7 10E3/UL (ref 0–1.3)
MONOCYTES NFR BLD AUTO: 8 %
NEUTROPHILS # BLD AUTO: 6.9 10E3/UL (ref 1.6–8.3)
NEUTROPHILS NFR BLD AUTO: 82 %
NRBC # BLD AUTO: 0 10E3/UL
NRBC BLD AUTO-RTO: 0 /100
NT-PROBNP SERPL-MCNC: 1797 PG/ML (ref 0–900)
PLATELET # BLD AUTO: 212 10E3/UL (ref 150–450)
POTASSIUM BLD-SCNC: 3.2 MMOL/L (ref 3.4–5.3)
PROT SERPL-MCNC: 7.3 G/DL (ref 6.8–8.8)
RBC # BLD AUTO: 3.67 10E6/UL (ref 4.4–5.9)
SARS-COV-2 RNA RESP QL NAA+PROBE: NEGATIVE
SODIUM SERPL-SCNC: 135 MMOL/L (ref 133–144)
TROPONIN I SERPL HS-MCNC: 19 NG/L
URATE SERPL-MCNC: 9.1 MG/DL (ref 3.5–7.2)
WBC # BLD AUTO: 8.4 10E3/UL (ref 4–11)

## 2021-12-27 PROCEDURE — 93005 ELECTROCARDIOGRAM TRACING: CPT

## 2021-12-27 PROCEDURE — C9803 HOPD COVID-19 SPEC COLLECT: HCPCS

## 2021-12-27 PROCEDURE — 36415 COLL VENOUS BLD VENIPUNCTURE: CPT | Performed by: PHYSICIAN ASSISTANT

## 2021-12-27 PROCEDURE — G0378 HOSPITAL OBSERVATION PER HR: HCPCS

## 2021-12-27 PROCEDURE — 84550 ASSAY OF BLOOD/URIC ACID: CPT | Performed by: INTERNAL MEDICINE

## 2021-12-27 PROCEDURE — 85025 COMPLETE CBC W/AUTO DIFF WBC: CPT | Performed by: PHYSICIAN ASSISTANT

## 2021-12-27 PROCEDURE — 99220 PR INITIAL OBSERVATION CARE,LEVEL III: CPT | Performed by: INTERNAL MEDICINE

## 2021-12-27 PROCEDURE — 84484 ASSAY OF TROPONIN QUANT: CPT | Performed by: PHYSICIAN ASSISTANT

## 2021-12-27 PROCEDURE — 83880 ASSAY OF NATRIURETIC PEPTIDE: CPT | Performed by: PHYSICIAN ASSISTANT

## 2021-12-27 PROCEDURE — 250N000011 HC RX IP 250 OP 636: Performed by: INTERNAL MEDICINE

## 2021-12-27 PROCEDURE — 87636 SARSCOV2 & INF A&B AMP PRB: CPT | Performed by: PHYSICIAN ASSISTANT

## 2021-12-27 PROCEDURE — 96367 TX/PROPH/DG ADDL SEQ IV INF: CPT

## 2021-12-27 PROCEDURE — 96368 THER/DIAG CONCURRENT INF: CPT

## 2021-12-27 PROCEDURE — 250N000011 HC RX IP 250 OP 636: Performed by: PHYSICIAN ASSISTANT

## 2021-12-27 PROCEDURE — 96365 THER/PROPH/DIAG IV INF INIT: CPT

## 2021-12-27 PROCEDURE — 82962 GLUCOSE BLOOD TEST: CPT

## 2021-12-27 PROCEDURE — 96366 THER/PROPH/DIAG IV INF ADDON: CPT

## 2021-12-27 PROCEDURE — 73660 X-RAY EXAM OF TOE(S): CPT | Mod: LT

## 2021-12-27 PROCEDURE — 71046 X-RAY EXAM CHEST 2 VIEWS: CPT

## 2021-12-27 PROCEDURE — 250N000013 HC RX MED GY IP 250 OP 250 PS 637: Performed by: INTERNAL MEDICINE

## 2021-12-27 PROCEDURE — 80053 COMPREHEN METABOLIC PANEL: CPT | Performed by: PHYSICIAN ASSISTANT

## 2021-12-27 PROCEDURE — 99291 CRITICAL CARE FIRST HOUR: CPT | Mod: 25

## 2021-12-27 RX ORDER — CEFAZOLIN SODIUM 2 G/100ML
2 INJECTION, SOLUTION INTRAVENOUS EVERY 8 HOURS
Status: DISCONTINUED | OUTPATIENT
Start: 2021-12-27 | End: 2021-12-29

## 2021-12-27 RX ORDER — ONDANSETRON 4 MG/1
4 TABLET, ORALLY DISINTEGRATING ORAL EVERY 6 HOURS PRN
Status: DISCONTINUED | OUTPATIENT
Start: 2021-12-27 | End: 2021-12-29 | Stop reason: HOSPADM

## 2021-12-27 RX ORDER — GABAPENTIN 300 MG/1
600 CAPSULE ORAL AT BEDTIME
Status: DISCONTINUED | OUTPATIENT
Start: 2021-12-27 | End: 2021-12-29 | Stop reason: HOSPADM

## 2021-12-27 RX ORDER — DEXTROSE MONOHYDRATE 25 G/50ML
25-50 INJECTION, SOLUTION INTRAVENOUS
Status: DISCONTINUED | OUTPATIENT
Start: 2021-12-27 | End: 2021-12-29 | Stop reason: HOSPADM

## 2021-12-27 RX ORDER — CLOPIDOGREL BISULFATE 75 MG/1
75 TABLET ORAL DAILY
Status: DISCONTINUED | OUTPATIENT
Start: 2021-12-28 | End: 2021-12-29 | Stop reason: HOSPADM

## 2021-12-27 RX ORDER — HYDROMORPHONE HCL IN WATER/PF 6 MG/30 ML
0.2 PATIENT CONTROLLED ANALGESIA SYRINGE INTRAVENOUS
Status: DISCONTINUED | OUTPATIENT
Start: 2021-12-27 | End: 2021-12-29 | Stop reason: HOSPADM

## 2021-12-27 RX ORDER — CARVEDILOL 25 MG/1
25 TABLET ORAL 2 TIMES DAILY WITH MEALS
Status: DISCONTINUED | OUTPATIENT
Start: 2021-12-27 | End: 2021-12-29 | Stop reason: HOSPADM

## 2021-12-27 RX ORDER — ATORVASTATIN CALCIUM 40 MG/1
40 TABLET, FILM COATED ORAL EVERY EVENING
Status: DISCONTINUED | OUTPATIENT
Start: 2021-12-27 | End: 2021-12-29 | Stop reason: HOSPADM

## 2021-12-27 RX ORDER — CEFTRIAXONE 2 G/1
2 INJECTION, POWDER, FOR SOLUTION INTRAMUSCULAR; INTRAVENOUS ONCE
Status: COMPLETED | OUTPATIENT
Start: 2021-12-27 | End: 2021-12-27

## 2021-12-27 RX ORDER — ISOSORBIDE MONONITRATE 30 MG/1
30 TABLET, EXTENDED RELEASE ORAL DAILY
Status: DISCONTINUED | OUTPATIENT
Start: 2021-12-28 | End: 2021-12-28

## 2021-12-27 RX ORDER — LISINOPRIL 2.5 MG/1
2.5 TABLET ORAL DAILY
Status: DISCONTINUED | OUTPATIENT
Start: 2021-12-28 | End: 2021-12-29 | Stop reason: HOSPADM

## 2021-12-27 RX ORDER — ACETAMINOPHEN 650 MG/1
650 SUPPOSITORY RECTAL EVERY 6 HOURS PRN
Status: DISCONTINUED | OUTPATIENT
Start: 2021-12-27 | End: 2021-12-29 | Stop reason: HOSPADM

## 2021-12-27 RX ORDER — LEVOTHYROXINE SODIUM 137 UG/1
137 TABLET ORAL DAILY
Status: DISCONTINUED | OUTPATIENT
Start: 2021-12-28 | End: 2021-12-29 | Stop reason: HOSPADM

## 2021-12-27 RX ORDER — PANTOPRAZOLE SODIUM 40 MG/1
40 TABLET, DELAYED RELEASE ORAL
Status: DISCONTINUED | OUTPATIENT
Start: 2021-12-28 | End: 2021-12-29 | Stop reason: HOSPADM

## 2021-12-27 RX ORDER — ACETAMINOPHEN 325 MG/1
650 TABLET ORAL EVERY 6 HOURS PRN
Status: DISCONTINUED | OUTPATIENT
Start: 2021-12-27 | End: 2021-12-28

## 2021-12-27 RX ORDER — HEPARIN SODIUM 10000 [USP'U]/100ML
0-5000 INJECTION, SOLUTION INTRAVENOUS CONTINUOUS
Status: DISCONTINUED | OUTPATIENT
Start: 2021-12-27 | End: 2021-12-28

## 2021-12-27 RX ORDER — ONDANSETRON 2 MG/ML
4 INJECTION INTRAMUSCULAR; INTRAVENOUS EVERY 6 HOURS PRN
Status: DISCONTINUED | OUTPATIENT
Start: 2021-12-27 | End: 2021-12-29 | Stop reason: HOSPADM

## 2021-12-27 RX ORDER — NICOTINE POLACRILEX 4 MG
15-30 LOZENGE BUCCAL
Status: DISCONTINUED | OUTPATIENT
Start: 2021-12-27 | End: 2021-12-29 | Stop reason: HOSPADM

## 2021-12-27 RX ORDER — HEPARIN SODIUM 10000 [USP'U]/100ML
0-5000 INJECTION, SOLUTION INTRAVENOUS CONTINUOUS
Status: DISCONTINUED | OUTPATIENT
Start: 2021-12-27 | End: 2021-12-27

## 2021-12-27 RX ADMIN — CEFAZOLIN SODIUM 2 G: 2 INJECTION, SOLUTION INTRAVENOUS at 22:26

## 2021-12-27 RX ADMIN — HEPARIN SODIUM AND DEXTROSE 1200 UNITS/HR: 10000; 5 INJECTION INTRAVENOUS at 22:27

## 2021-12-27 RX ADMIN — CARVEDILOL 25 MG: 25 TABLET, FILM COATED ORAL at 22:26

## 2021-12-27 RX ADMIN — CEFTRIAXONE 2 G: 2 INJECTION, POWDER, FOR SOLUTION INTRAMUSCULAR; INTRAVENOUS at 19:33

## 2021-12-27 RX ADMIN — GABAPENTIN 600 MG: 300 CAPSULE ORAL at 22:26

## 2021-12-27 ASSESSMENT — ENCOUNTER SYMPTOMS
LIGHT-HEADEDNESS: 1
WEAKNESS: 1
SHORTNESS OF BREATH: 1
FATIGUE: 1

## 2021-12-27 ASSESSMENT — MIFFLIN-ST. JEOR: SCORE: 1387.21

## 2021-12-27 NOTE — ED PROVIDER NOTES
ED ATTENDING PHYSICIAN NOTE:   I evaluated this patient in conjunction with Don Cobb PA-C  I have participated in the care of the patient and personally performed key elements of the history, exam, and medical decision making.      HPI:   Jayro Elder is a 71 year old male who presents with chest pain. Three days ago when walking to his mailbox he began to experience mid upper chest pain which has been constant since onset. The pain is described as tightness and has radiated up his neck and into his back. The patient notes the chest pain is worse with movement and decreases when laying down. He additionally has felt short of breath, light headed, weak, and fatigued. The patient also has some baseline bilateral lower extremity swelling which is worse than typical.  He notes he has chronic burning neuropathic pain is unchanged.  He notes that he cannot see down to his leg so is unable to tell if there is any color changes.  He reports multiple toe infections.    Medications:    apixaban   atorvastatin   carvedilol   cetirizine   clopidogrel  ferrous sulfate   furosemide  gabapentin  insulin aspart  insulin glargine   isosorbide mononitrate   levothyroxine   lisinopril   nitroglycerin  pantoprazole   potassium chloride ER   Vitamin D    Past Medical History:    CAD (coronary artery disease)  Cancer   Cardiomyopathy   Cellulitis of left lower extremity  Cerebral infarction  Diabetic ulcer of left midfoot   Type 2 diabetes mellitus  Essential hypertension  Generalized osteoarthrosis  Microalbuminuria  Myocardial infarction  Neuropathy  Sepsis   Sleep apnea  Substance abuse  Thyroid disease  Benign neoplasm of lower jaw bone  Cardiovascular disease  CVA (cerebral vascular accident)  Chronic atrial fibrillation  Homonymous hemianopsia  Bacteremia  CHF (congestive heart failure)   Hyperlipidemia  Hypothyroidism  Non-toxic nodular goiter  Acute osteomyelitis  Unstable angina  amputation of lesser toe, right  Diabetic  polyneuropathy  Osteomyelitis of third toe of left foot  Hammertoes of both feet  Chronic kidney disease, stage 3     EXAM:   BP (!) 149/82   Pulse 77   Temp 97.4  F (36.3  C)   Resp 18   Wt 122.5 kg (270 lb)   SpO2 99%   BMI 32.87 kg/m    General: Large adult male sitting upright  Eyes: PERRL, Conjunctive within normal limits.  No scleral icterus  ENT: Moist mucous membranes, oropharynx clear.   CV: Normal S1S2, no murmur, rub or gallop.  Irregularly irregular.  Resp: Diminished at the bases.  Otherwise clear.  No wheezes/rales/rhonchi.  Normal respiratory effort.  GI: Abdomen is soft, nontender and nondistended. No palpable masses. No rebound or guarding.  MSK: Bilateral lower extremity edema into the feet, left greater than right.  Nontender. Normal active range of motion.  Skin: Warm and dry.  Deep red erythema of the left great toe which is edematous.  No crepitus.  No discharge.  Not fluctuant.  Patchy erythema of the dorsal foot and calf.  Mildly increased warmth to touch over that leg compared to the other leg.  Callus over the medial aspect of the left great toe and medial foot.  Neuro: Alert and oriented. Responds appropriately to all questions and commands. No focal findings appreciated. Normal muscle tone.  Psych: Normal mood and affect.      MEDICAL DECISION MAKING/ASSESSMENT AND PLAN:     ECG  ECG obtained at 1445, ECG read at 1445  Atrial fibrillation. Left axis deviation. RBBB. Anterior septal infarct, age undetermined. Abnormal ECG.   No significant change as compared to, 04/08/2021  Rate 93 bpm. SC interval * ms. QRS duration 142 ms. QT/QTc 408/507 ms. P-R-T axes* -58 -37.     Imaging:  XR Chest 2 Views:  There are no acute infiltrates. The cardiac silhouette is   not enlarged. Pulmonary vasculature is unremarkable.   Per radiology    XR Toe Left G/E 2 Views:  Soft tissue swelling of the great toe. No evidence of acute fracture. Mild to moderate first MTP degenerative changes. Mild  irregularity of the medial aspect of the head of the first metatarsal could be chronic erosive change. Recommend   clinical correlation for history of gout.  Per radiology    Laboratory:  CMP: Potassium 3.2 (L), Calcium 8.3 (L), Glucose 212 (H), Bilirubin Total 1.7 (H) o/w WNL (Creatinine 1.22)   CBC: WBC 8.4, HGB 10.9 (L),     Troponin (Collected 1530): 19  Nt probnp inpatient (BNP): 1,797 (H)    Symptomatic Influenza A/B & SARS-CoV2 (COVID19) Virus PCR Multiplex: Negative     Interventions:  1933 Ceftriaxone, 2 g, IV     MDM:    In brief, Jayro Stevenson now is a 71-year-old male with known coronary artery disease, currently medically managed, most recent coronary artery angiography performed in April 2021 who presents emergency department with exertional pain and dizziness concerning for exertional angina.  At rest he has minimal symptoms.  Troponin was normal.  ECG demonstrated rate controlled atrial fibrillation.  No obvious signs of acute ischemia or injury.  Incidentally noted on examination and unbeknownst to him, he has erythema of his left great toe spreading into his left lower leg concerning for possible cellulitis or toe infection.  He has had multiple toe infection secondary to his underlying diabetes mellitus.  No signs of systemic disease with regards to this at this time.  He was given antibiotics, he is on Eliquis so currently anticoagulated.  After discussion with cardiology this was held in preparation for possible intervention tomorrow.  He remained stable in the ED.  He was accepting the plan for admission.  All questions were answered prior to admission.       DIAGNOSIS:     ICD-10-CM    1. Chest pain  R07.9    2. Dyspnea  R06.00    3. Weakness  R53.1    4. Lightheadedness  R42    5. Cellulitis of toe of left foot  L03.032        DISPOSITION:     Don Cobb PA-C consulted with Diego JAVIER who is admitting for Dr. Xiang Baumann of the hospitalist services.     12/27/2021  [facility]  [facility] EMERGENCY DEPT       Radha Rai MD  12/27/21 5209

## 2021-12-27 NOTE — Clinical Note
Hemodynamic equipment used: 5 lead ECG, LIKEPAK Hands Off Patches, Calometric ETCO2 device, Machine BP Cuff and pulse oximeter probe.

## 2021-12-27 NOTE — ED PROVIDER NOTES
History     Chief Complaint:  Chest Pain       HPI   Jayro Elder is a 71 year old male who presents to the ED for evaluation of chest pain.  Patient reports that about 3 days ago he was walking to his mailbox when he developed mid upper chest pain which has been constant since onset.  He describes it as a tightness which radiates up through his neck and through his back.  Its worse when he exerts himself, better when he is laying around.  He reports associated dyspnea, generalized weakness, lightheadedness, and fatigue.  He states he has had multiple stents placed in has some suspicion that this is similar to previous.  He denies any fevers, chills, rhinorrhea, congestion, sore throat, cough, abdominal pain, nausea, vomiting, diarrhea, or urinary symptoms.  He does note bilateral lower extremity swelling that is at his baseline.    Angiogram 4/9/21    3 vessel coronary artery disease    >>> Mild diffuse 50% instent restenosis and focal 75% instent restenosis of mid LAD (physiologically insignificant, FFR 0.87)    >>> 30% proximal RCA, 40% mid ISR RCA, and 30% distal ISR RCA disease    >>> 25% OM3 ISR    Echo 4/8/21  Interpretation Summary  Mildly (EF 40-45%) reduced left ventricular function is present.  Global right ventricular function is mildly reduced.  IVC diameter >2.1 cm collapsing <50% with sniff suggests a high RA pressure  estimated at 15 mmHg or greater.  No pericardial effusion is present.  Compared to prior study, there may be mild improvement in LV fxn.    ROS:  Review of Systems   Constitutional: Positive for fatigue.   Respiratory: Positive for shortness of breath.    Cardiovascular: Positive for chest pain and leg swelling.   Neurological: Positive for weakness and light-headedness.   All other systems reviewed and are negative.      Allergies:  No Known Allergies     Medications:    apixaban   atorvastatin   carvedilol   cetirizine   clopidogrel  ferrous sulfate  Pt notified of US results and verbalized understanding.      furosemide  gabapentin  insulin aspart  insulin glargine   isosorbide mononitrate   levothyroxine   lisinopril   nitroglycerin  pantoprazole   potassium chloride ER   Vitamin Dapixaban   atorvastatin   carvedilol   cetirizine   clopidogrel  ferrous sulfate   furosemide  gabapentin  insulin aspart  insulin glargine   isosorbide mononitrate   levothyroxine   lisinopril   nitroglycerin  pantoprazole   potassium chloride ER   Vitamin D    Past Medical History:    CAD (coronary artery disease)  Cancer   Cardiomyopathy   Cellulitis of left lower extremity  Cerebral infarction  Diabetic ulcer of left midfoot   Type 2 diabetes mellitus  Essential hypertension  Generalized osteoarthrosis  Microalbuminuria  Myocardial infarction  Neuropathy  Sepsis   Sleep apnea  Substance abuse  Thyroid disease  Benign neoplasm of lower jaw bone  Cardiovascular disease  CVA (cerebral vascular accident)  Chronic atrial fibrillation  Homonymous hemianopsia  Bacteremia  CHF (congestive heart failure)   Hyperlipidemia  Hypothyroidism  Non-toxic nodular goiter  Acute osteomyelitis  Unstable angina  amputation of lesser toe, right  Diabetic polyneuropathy  Osteomyelitis of third toe of left foot  Hammertoes of both feet  Chronic kidney disease, stage 3    Past Surgical History:    Past Surgical History:   Procedure Laterality Date     AMPUTATE TOE(S) Right 1/6/2019    Procedure: Right open second toe partial amputation;  Surgeon: Sabino Garcia DPM;  Location: [facility] OR     AMPUTATE TOE(S) Right 1/8/2019    Procedure: 1.  Revision right second toe amputation with resection of distal half of proximal phalanx with full closure.    2.  Debridement of callus/preulcerative lesion, distal left second toe and plantar left first metatarsophalangeal joint.;  Surgeon: Ryan Bhagat DPM;  Location: [facility] OR     AMPUTATE TOE(S) Right 3/12/2019    Procedure: Partial right fourth toe amputation.;  Surgeon: Ryan Bhagat DPM;  Location: [facility] OR      AMPUTATE TOE(S) Right 5/6/2019    Procedure: Right partial third toe amputation;  Surgeon: Татьяна Mckeon DPM, Podiatry/Foot and Ankle Surgery;  Location: [facility] OR     AMPUTATE TOE(S) Left 4/18/2020    Procedure: LEFT SECOND TOE AMPUTATION;  Surgeon: Ryan Bhagat DPM;  Location: [facility] OR     AMPUTATE TOE(S) Left 5/8/2021    Procedure: 1.  Amputation of the left third toe at the level of the proximal interphalangeal joint. 2.  Excisional debridement of less than 20 square cm down to the level of the deeper skin, plantar left foot.;  Surgeon: Kwasi Root DPM;  Location: [facility] OR     AMPUTATE TOE(S) Right 2019    4 toes amputation      ANGIOGRAM  6/29/05    subtotal occ.mid LAD,SUSAN mid LAD     ANGIOGRAM  7/05    mild CAD,patent stent,no flow-limiting lesions,sev.LV dysf.LAD enlarged     ANGIOGRAM  2/09    Sev.single vessel disease,Mod LV dysf.distal LAD 90%,70-75% mid lad just before prev stent,SUSAN to prox.mid LAD, endeavor to distal LAD     ANGIOGRAM  11/13/13    restenosis, stent LAD     BACK SURGERY      back surgery x3     CARDIAC CATHETERIZATION  07/08/2019     CARDIAC CATHETERIZATION Left 06/13/2017     CARDIAC CATHETERIZATION  2005,2009,20013     CORONARY STENT PLACEMENT  07/08/2019     CV CORONARY ANGIOGRAM N/A 7/8/2019    Procedure: Coronary Angiogram;  Surgeon: Jose Alfredo Crockett MD;  Location: [facility] CARDIAC CATH LAB     CV CORONARY ANGIOGRAM N/A 4/9/2021    Procedure: CV CORONARY ANGIOGRAM;  Surgeon: Warren Paulson MD;  Location: [facility] CARDIAC CATH LAB     CV LEFT HEART CATH N/A 7/8/2019    Procedure: Left Heart Cath;  Surgeon: Jose Alfredo Crockett MD;  Location:  HEART CARDIAC CATH LAB     CV PCI ASPIRATION THROMECTOMY N/A 7/8/2019    Procedure: PCI Aspiration Thrombectomy;  Surgeon: Jose Alfredo Crockett MD;  Location:  HEART CARDIAC CATH LAB     CV PCI STENT DRUG ELUTING N/A 7/8/2019    Procedure: PCI Stent Drug Eluting;  Surgeon: Jose Alfredo Crockett MD;  Location:  HEART CARDIAC CATH  LAB     HEART CATH LEFT HEART CATH  06/13/2017    SUSAN to OM3     INCISION AND DRAINAGE TOE, COMBINED Bilateral 9/11/2018    Procedure: COMBINED INCISION AND DRAINAGE TOE;  1) Irrigation and debridement ulcer left great toe  2) Amputation 3rd toe right foot at distal interphalangeal joint level;  Surgeon: Miki Harp MD;  Location: [facility] OR     IRRIGATION AND DEBRIDEMENT FOOT, COMBINED Left 3/12/2019    Procedure: Debridement of left foot ulcer sub first MPJ 2 x 2.5 cm down to and including subcutaneous tissue, callus debridement for preulcerative lesion, left second toe.;  Surgeon: Ryan Bhagat DPM;  Location:  OR     ORTHOPEDIC SURGERY      bunion surgery both feet     ORTHOPEDIC SURGERY      left shoulder     OTHER SURGICAL HISTORY  9 years old    cancer tumor mouth     SHOULDER ARTHROSCOPY W/ ROTATOR CUFF REPAIR Left 2004     SOFT TISSUE SURGERY      debridement of toe numerous time     VASCULAR SURGERY      7 stents in heart     VASCULAR SURGERY       [facility] NONSPECIFIC PROCEDURE      Laminectomy x 3 - (1983 x 2 & 1990)     [facility] NONSPECIFIC PROCEDURE Bilateral 1998    Bunionectomy     [facility] NONSPECIFIC PROCEDURE  1959    Gingival surgery at age 9        Family History:    family history includes Arthritis in his mother; Cancer in his father; Cancer - colorectal in his sister; Cardiovascular in his brother; Diabetes in his brother and mother; Thyroid Disease in his brother and mother.    Social History:   reports that he quit smoking about 16 years ago. His smoking use included cigarettes. He started smoking about 42 years ago. He has a 25.00 pack-year smoking history. He has never used smokeless tobacco. He reports current alcohol use of about 4.0 standard drinks of alcohol per week. He reports that he does not use drugs.    PCP: No Ref-Primary, Physician     Physical Exam     Patient Vitals for the past 24 hrs:   BP Temp Pulse Resp SpO2   12/27/21 1700 (!) 149/82 -- 77 -- 99 %   12/27/21 1615  115/72 -- 86 -- 96 %   12/27/21 1600 135/74 -- 80 -- 97 %   12/27/21 1500 122/76 -- 81 -- 97 %   12/27/21 1439 110/68 97.4  F (36.3  C) 86 18 99 %        Physical Exam  Constitutional: Pleasant. Cooperative.   Eyes: Pupils equally round and reactive  HENT: Head is normal in appearance. Oropharynx is normal with moist mucus membranes.  Cardiovascular: Irregularly irregular rhythm, normal rate, without murmurs.  Respiratory: Normal respiratory effort, lungs are clear bilaterally.  GI: Abdomen is soft, non-tender, non-distended. No guarding, rebound, or rigidity.  Musculoskeletal: No asymmetry of the lower extremities, no tenderness to palpation. Multiple toes missing to bilateral feet. Erythema, warmth, and swelling noted to left great toe. No purulence noted. No tenderness.  Skin: Normal, without rash.  Neurologic: Cranial nerves grossly intact, normal cognition, no focal deficits. Alert and oriented x 3.   Psychiatric: Normal affect.  Nursing notes and vital signs reviewed.            Emergency Department Course   ECG:  @ 1445  Vent. Rate 93 bpm. WI interval * ms. QRS duration 142 ms. QT/QTc 408/507 ms. P-R-T axis * -58 -37.   Afib. Left axis deviation. RBBB.Anteriorseptal infarct, age undetermined. Abnormal ECG.    Imaging:  XR Toe Left G/E 2 Views   Final Result   IMPRESSION: Soft tissue swelling of the great toe. No evidence of acute fracture. Mild to moderate first MTP degenerative changes. Mild irregularity of the medial aspect of the head of the first metatarsal could be chronic erosive change. Recommend    clinical correlation for history of gout.      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      No acute lytic or destructive lesions. Moderate midfoot degenerative changes.      XR Chest 2 Views   Final Result   IMPRESSION: There are no acute infiltrates. The cardiac silhouette is   not enlarged. Pulmonary vasculature is unremarkable.      YE GAONA MD            SYSTEM ID:  [id number]          Laboratory:  Labs Ordered and Resulted from Time of ED Arrival to Time of ED Departure   COMPREHENSIVE METABOLIC PANEL - Abnormal       Result Value    Sodium 135      Potassium 3.2 (*)     Chloride 100      Carbon Dioxide (CO2) 29      Anion Gap 6      Urea Nitrogen 21      Creatinine 1.22      Calcium 8.3 (*)     Glucose 212 (*)     Alkaline Phosphatase 118      AST 19      ALT 38      Protein Total 7.3      Albumin 3.4      Bilirubin Total 1.7 (*)     GFR Estimate 63     NT PROBNP INPATIENT - Abnormal    N terminal Pro BNP Inpatient 1,797 (*)    CBC WITH PLATELETS AND DIFFERENTIAL - Abnormal    WBC Count 8.4      RBC Count 3.67 (*)     Hemoglobin 10.9 (*)     Hematocrit 33.1 (*)     MCV 90      MCH 29.7      MCHC 32.9      RDW 13.8      Platelet Count 212      % Neutrophils 82      % Lymphocytes 9      % Monocytes 8      % Eosinophils 1      % Basophils 0      % Immature Granulocytes 0      NRBCs per 100 WBC 0      Absolute Neutrophils 6.9      Absolute Lymphocytes 0.7 (*)     Absolute Monocytes 0.7      Absolute Eosinophils 0.1      Absolute Basophils 0.0      Absolute Immature Granulocytes 0.0      Absolute NRBCs 0.0     INFLUENZA A/B & SARS-COV2 PCR MULTIPLEX - Normal    Influenza A PCR Negative      Influenza B PCR Negative      SARS CoV2 PCR Negative     TROPONIN I - Normal    Troponin I High Sensitivity 19        Emergency Department Course:    Reviewed:  I reviewed nursing notes, vitals, past medical history and Care Everywhere    HEART Score  Background  Calculates the overall risk of adverse event in patient's presenting with chest pain.  Based on 5 criteria (each assigned 0-2 points) including suspiciousness of history, EKG, age, risk factors and troponin.    Data  71 year old male  has Generalized osteoarthrosis, unspecified site; Tobacco use disorder; Hypertension goal BP (blood pressure) < 140/90; Benign neoplasm of lower jaw bone; Abdominal pain, right upper quadrant; Diabetes mellitus, type 2  (H); Cardiovascular disease; CARDIOVASCULAR SCREENING; LDL GOAL LESS THAN 100; Health Care Home; Cardiomyopathy (H); CVA (cerebral vascular accident) (H); Coronary artery disease involving native coronary artery of native heart with angina pectoris (H); Status post coronary angiogram; Chronic atrial fibrillation (H); Homonymous hemianopsia, right; Cellulitis of left lower extremity; Syncope, unspecified syncope type; Diabetic ulcer of left midfoot associated with type 2 diabetes mellitus, with fat layer exposed (H); Type 2 diabetes mellitus with diabetic peripheral angiopathy without gangrene (H); Microalbuminuria; Cellulitis; Bacteremia; CHF (congestive heart failure) (H); History of CVA (cerebrovascular accident); Hyperlipidemia; Hypothyroidism; Non-toxic nodular goiter; Acute osteomyelitis (H); Post-operative state; Acute chest pain; Unstable angina (H); Hypertension; H/O amputation of lesser toe, right (H); Atrial fibrillation, unspecified type (H); Diabetic infection of left foot (H); Wound infection; Orthostatic hypotension; Dizziness; Acute on chronic systolic congestive heart failure (H); Chest pain, unspecified type; Diabetic polyneuropathy associated with type 2 diabetes mellitus (H); Ulcer of left foot, with necrosis of bone (H); Osteomyelitis of third toe of left foot (H); Hammertoes of both feet; H/O amputation of lesser toe, left (H); Osteomyelitis (H); Chronic kidney disease, stage 3 (H); and Polyneuropathy associated with underlying disease (H) on their problem list.   reports that he quit smoking about 16 years ago. His smoking use included cigarettes. He started smoking about 42 years ago. He has a 25.00 pack-year smoking history. He has never used smokeless tobacco.  family history includes Arthritis in his mother; Cancer in his father; Cancer - colorectal in his sister; Cardiovascular in his brother; Diabetes in his brother and mother; Thyroid Disease in his brother and mother.  Lab Results    Component Value Date    TROPI <0.015 04/08/2021     Criteria   0-2 points for each of 5 items (maximum of 10 points):  Score 1- History moderately suspicious for coronary syndrome  Score 1- EKG with Non-specific repolarization disturbance  Score 2- Age 65 years or older  Score 2- Three or more risk factors for or history of atherosclerotic disease  Score 0- Within normal limits for troponin levels  Interpretation  Risk of adverse outcome  Heart Score: 6  Total Score 4-6- Adverse Outcome Risk 20.3% - Supports admission with standard rule-out management -serial troponins and stress testing    Assessments:   I obtained history and examined the patient as noted above.    I rechecked the patient and explained findings.     Interventions:  Rocephin 1g IV    Consults:   I discussed the case with cardiology on-call, who recommended holding apixaban given possibility of procedural intervention tomorrow.  Discussed the case with the hospitalist.    Disposition:  The patient was discharged to home.     Impression & Plan      Covid-19  Jayro Elder was evaluated during a global COVID-19 pandemic, which necessitated consideration that the patient might be at risk for infection with the SARS-CoV-2 virus that causes COVID-19.   Applicable protocols for evaluation were followed during the patient's care.   COVID-19 was considered as part of the patient's evaluation. The plan for testing is:  a test was obtained during this visit.    Medical Decision Making:  Jayro Elder is a 71 year old male with complex cardiac history including multiple stents placed who presents to the ED for evaluation of exertional chest pain and dyspnea. See HPI as above for additional details. Vitals and physical exam as above. DDx was broad and included ACS, dissection, PE, PTX, GERD, MSK, pericarditis, PNA, among others. Work up as above. Concern for angina given cardiac history and chest pain/dyspnea only present with exertion, relieved while  at rest. Lower suspicion for remainder of differential at this time given history, physical exam, and work up. Discussed indication for admission. Patient in agreement for admission. Discussed the case with the cardiologist on call, who recommended that patient have apixaban held going forward in case procedure is warranted tomorrow. Discussed case with hospitalist, who agreed to admit the patient. Of note, patient does have area of erythema and swelling noted to his left great toe. No bony involvement. Will cover patient for possible cellulitis at this time. All questions answered. Patient admitted in stable condition.    Diagnosis:    ICD-10-CM    1. Chest pain  R07.9    2. Dyspnea  R06.00    3. Weakness  R53.1    4. Lightheadedness  R42    5. Cellulitis of toe of left foot  L03.032         12/27/2021   Don Cobb PA-C     This record was created at least in part using electronic voice recognition software, so please excuse any typographical errors.       Don Cobb PA-C  12/27/21 1849

## 2021-12-27 NOTE — ED TRIAGE NOTES
Patient presents to the ED reporting exertional chest pain for the past few days. States at times feels SOB and dizzy with the pain. History of previous MI's with 10 stents.

## 2021-12-28 ENCOUNTER — APPOINTMENT (OUTPATIENT)
Dept: CARDIOLOGY | Facility: CLINIC | Age: 71
End: 2021-12-28
Attending: INTERNAL MEDICINE
Payer: COMMERCIAL

## 2021-12-28 LAB
ACT BLD: 139 SECONDS (ref 74–150)
ANION GAP SERPL CALCULATED.3IONS-SCNC: 6 MMOL/L (ref 3–14)
APTT PPP: 58 SECONDS (ref 22–38)
ATRIAL RATE - MUSE: 110 BPM
BI-PLANE LVEF ECHO: NORMAL
BUN SERPL-MCNC: 19 MG/DL (ref 7–30)
CALCIUM SERPL-MCNC: 8.7 MG/DL (ref 8.5–10.1)
CATH EF ESTIMATED: 35 %
CHLORIDE BLD-SCNC: 101 MMOL/L (ref 94–109)
CO2 SERPL-SCNC: 29 MMOL/L (ref 20–32)
CREAT SERPL-MCNC: 1.12 MG/DL (ref 0.66–1.25)
DIASTOLIC BLOOD PRESSURE - MUSE: NORMAL MMHG
ERYTHROCYTE [DISTWIDTH] IN BLOOD BY AUTOMATED COUNT: 13.7 % (ref 10–15)
GFR SERPL CREATININE-BSD FRML MDRD: 70 ML/MIN/1.73M2
GLUCOSE BLD-MCNC: 186 MG/DL (ref 70–99)
GLUCOSE BLDC GLUCOMTR-MCNC: 160 MG/DL (ref 70–99)
GLUCOSE BLDC GLUCOMTR-MCNC: 167 MG/DL (ref 70–99)
GLUCOSE BLDC GLUCOMTR-MCNC: 201 MG/DL (ref 70–99)
GLUCOSE BLDC GLUCOMTR-MCNC: 203 MG/DL (ref 70–99)
GLUCOSE BLDC GLUCOMTR-MCNC: 215 MG/DL (ref 70–99)
HBA1C MFR BLD: 8.5 % (ref 0–5.6)
HCT VFR BLD AUTO: 30.7 % (ref 40–53)
HGB BLD-MCNC: 9.9 G/DL (ref 13.3–17.7)
INTERPRETATION ECG - MUSE: NORMAL
MAGNESIUM SERPL-MCNC: 1.9 MG/DL (ref 1.6–2.3)
MCH RBC QN AUTO: 29.3 PG (ref 26.5–33)
MCHC RBC AUTO-ENTMCNC: 32.2 G/DL (ref 31.5–36.5)
MCV RBC AUTO: 91 FL (ref 78–100)
P AXIS - MUSE: NORMAL DEGREES
PLATELET # BLD AUTO: 175 10E3/UL (ref 150–450)
POTASSIUM BLD-SCNC: 3.1 MMOL/L (ref 3.4–5.3)
POTASSIUM BLD-SCNC: 3.4 MMOL/L (ref 3.4–5.3)
PR INTERVAL - MUSE: NORMAL MS
QRS DURATION - MUSE: 142 MS
QT - MUSE: 408 MS
QTC - MUSE: 507 MS
R AXIS - MUSE: -58 DEGREES
RBC # BLD AUTO: 3.38 10E6/UL (ref 4.4–5.9)
SODIUM SERPL-SCNC: 136 MMOL/L (ref 133–144)
SYSTOLIC BLOOD PRESSURE - MUSE: NORMAL MMHG
T AXIS - MUSE: -37 DEGREES
TROPONIN I SERPL HS-MCNC: 21 NG/L
VENTRICULAR RATE- MUSE: 93 BPM
WBC # BLD AUTO: 5.9 10E3/UL (ref 4–11)

## 2021-12-28 PROCEDURE — 93571 IV DOP VEL&/PRESS C FLO 1ST: CPT | Performed by: INTERNAL MEDICINE

## 2021-12-28 PROCEDURE — 96361 HYDRATE IV INFUSION ADD-ON: CPT | Mod: XU

## 2021-12-28 PROCEDURE — 96372 THER/PROPH/DIAG INJ SC/IM: CPT | Mod: XU | Performed by: PHYSICIAN ASSISTANT

## 2021-12-28 PROCEDURE — 255N000002 HC RX 255 OP 636: Performed by: INTERNAL MEDICINE

## 2021-12-28 PROCEDURE — 93571 IV DOP VEL&/PRESS C FLO 1ST: CPT | Mod: 26 | Performed by: INTERNAL MEDICINE

## 2021-12-28 PROCEDURE — 999N000054 HC STATISTIC EKG NON-CHARGEABLE

## 2021-12-28 PROCEDURE — 93572 IV DOP VEL&/PRESS C FLO EA: CPT | Performed by: INTERNAL MEDICINE

## 2021-12-28 PROCEDURE — 84132 ASSAY OF SERUM POTASSIUM: CPT | Mod: 91 | Performed by: INTERNAL MEDICINE

## 2021-12-28 PROCEDURE — 93005 ELECTROCARDIOGRAM TRACING: CPT

## 2021-12-28 PROCEDURE — 250N000011 HC RX IP 250 OP 636: Performed by: INTERNAL MEDICINE

## 2021-12-28 PROCEDURE — 250N000009 HC RX 250: Performed by: INTERNAL MEDICINE

## 2021-12-28 PROCEDURE — 99152 MOD SED SAME PHYS/QHP 5/>YRS: CPT | Performed by: INTERNAL MEDICINE

## 2021-12-28 PROCEDURE — 250N000013 HC RX MED GY IP 250 OP 250 PS 637: Performed by: PHYSICIAN ASSISTANT

## 2021-12-28 PROCEDURE — 99153 MOD SED SAME PHYS/QHP EA: CPT | Performed by: INTERNAL MEDICINE

## 2021-12-28 PROCEDURE — 83735 ASSAY OF MAGNESIUM: CPT | Performed by: INTERNAL MEDICINE

## 2021-12-28 PROCEDURE — 258N000003 HC RX IP 258 OP 636: Performed by: INTERNAL MEDICINE

## 2021-12-28 PROCEDURE — 250N000012 HC RX MED GY IP 250 OP 636 PS 637: Performed by: PHYSICIAN ASSISTANT

## 2021-12-28 PROCEDURE — 85730 THROMBOPLASTIN TIME PARTIAL: CPT | Performed by: INTERNAL MEDICINE

## 2021-12-28 PROCEDURE — 84132 ASSAY OF SERUM POTASSIUM: CPT | Performed by: INTERNAL MEDICINE

## 2021-12-28 PROCEDURE — 96366 THER/PROPH/DIAG IV INF ADDON: CPT

## 2021-12-28 PROCEDURE — 93306 TTE W/DOPPLER COMPLETE: CPT | Mod: 26 | Performed by: INTERNAL MEDICINE

## 2021-12-28 PROCEDURE — 250N000013 HC RX MED GY IP 250 OP 250 PS 637: Performed by: INTERNAL MEDICINE

## 2021-12-28 PROCEDURE — 99214 OFFICE O/P EST MOD 30 MIN: CPT | Mod: 25 | Performed by: INTERNAL MEDICINE

## 2021-12-28 PROCEDURE — 272N000001 HC OR GENERAL SUPPLY STERILE: Performed by: INTERNAL MEDICINE

## 2021-12-28 PROCEDURE — 82962 GLUCOSE BLOOD TEST: CPT

## 2021-12-28 PROCEDURE — 93458 L HRT ARTERY/VENTRICLE ANGIO: CPT | Mod: 26 | Performed by: INTERNAL MEDICINE

## 2021-12-28 PROCEDURE — 84484 ASSAY OF TROPONIN QUANT: CPT | Performed by: INTERNAL MEDICINE

## 2021-12-28 PROCEDURE — 85347 COAGULATION TIME ACTIVATED: CPT

## 2021-12-28 PROCEDURE — 93572 IV DOP VEL&/PRESS C FLO EA: CPT | Mod: 26 | Performed by: INTERNAL MEDICINE

## 2021-12-28 PROCEDURE — 85027 COMPLETE CBC AUTOMATED: CPT | Performed by: INTERNAL MEDICINE

## 2021-12-28 PROCEDURE — C1769 GUIDE WIRE: HCPCS | Performed by: INTERNAL MEDICINE

## 2021-12-28 PROCEDURE — 36415 COLL VENOUS BLD VENIPUNCTURE: CPT | Performed by: INTERNAL MEDICINE

## 2021-12-28 PROCEDURE — 93458 L HRT ARTERY/VENTRICLE ANGIO: CPT | Performed by: INTERNAL MEDICINE

## 2021-12-28 PROCEDURE — G0378 HOSPITAL OBSERVATION PER HR: HCPCS

## 2021-12-28 PROCEDURE — 83036 HEMOGLOBIN GLYCOSYLATED A1C: CPT | Performed by: PHYSICIAN ASSISTANT

## 2021-12-28 PROCEDURE — 999N000208 ECHOCARDIOGRAM COMPLETE

## 2021-12-28 RX ORDER — ASPIRIN 325 MG
325 TABLET ORAL ONCE
Status: COMPLETED | OUTPATIENT
Start: 2021-12-28 | End: 2021-12-28

## 2021-12-28 RX ORDER — OXYCODONE HYDROCHLORIDE 5 MG/1
5 TABLET ORAL EVERY 4 HOURS PRN
Status: DISCONTINUED | OUTPATIENT
Start: 2021-12-28 | End: 2021-12-29 | Stop reason: HOSPADM

## 2021-12-28 RX ORDER — LORAZEPAM 2 MG/ML
0.5 INJECTION INTRAMUSCULAR
Status: DISCONTINUED | OUTPATIENT
Start: 2021-12-28 | End: 2021-12-28 | Stop reason: HOSPADM

## 2021-12-28 RX ORDER — NALOXONE HYDROCHLORIDE 0.4 MG/ML
0.4 INJECTION, SOLUTION INTRAMUSCULAR; INTRAVENOUS; SUBCUTANEOUS
Status: ACTIVE | OUTPATIENT
Start: 2021-12-28 | End: 2021-12-28

## 2021-12-28 RX ORDER — LIDOCAINE 40 MG/G
CREAM TOPICAL
Status: DISCONTINUED | OUTPATIENT
Start: 2021-12-28 | End: 2021-12-28 | Stop reason: HOSPADM

## 2021-12-28 RX ORDER — ASPIRIN 81 MG/1
81 TABLET, CHEWABLE ORAL DAILY
Status: DISCONTINUED | OUTPATIENT
Start: 2021-12-28 | End: 2021-12-29 | Stop reason: HOSPADM

## 2021-12-28 RX ORDER — HEPARIN SODIUM 1000 [USP'U]/ML
INJECTION, SOLUTION INTRAVENOUS; SUBCUTANEOUS
Status: DISCONTINUED
Start: 2021-12-28 | End: 2021-12-28 | Stop reason: HOSPADM

## 2021-12-28 RX ORDER — FENTANYL CITRATE 50 UG/ML
25 INJECTION, SOLUTION INTRAMUSCULAR; INTRAVENOUS
Status: DISCONTINUED | OUTPATIENT
Start: 2021-12-28 | End: 2021-12-29 | Stop reason: HOSPADM

## 2021-12-28 RX ORDER — OXYCODONE HYDROCHLORIDE 5 MG/1
10 TABLET ORAL EVERY 4 HOURS PRN
Status: DISCONTINUED | OUTPATIENT
Start: 2021-12-28 | End: 2021-12-29 | Stop reason: HOSPADM

## 2021-12-28 RX ORDER — SODIUM CHLORIDE 9 MG/ML
75 INJECTION, SOLUTION INTRAVENOUS CONTINUOUS
Status: ACTIVE | OUTPATIENT
Start: 2021-12-28 | End: 2021-12-28

## 2021-12-28 RX ORDER — ATROPINE SULFATE 0.1 MG/ML
0.5 INJECTION INTRAVENOUS
Status: ACTIVE | OUTPATIENT
Start: 2021-12-28 | End: 2021-12-28

## 2021-12-28 RX ORDER — NITROGLYCERIN 5 MG/ML
VIAL (ML) INTRAVENOUS
Status: DISCONTINUED
Start: 2021-12-28 | End: 2021-12-28 | Stop reason: HOSPADM

## 2021-12-28 RX ORDER — FENTANYL CITRATE 50 UG/ML
INJECTION, SOLUTION INTRAMUSCULAR; INTRAVENOUS
Status: DISCONTINUED | OUTPATIENT
Start: 2021-12-28 | End: 2021-12-28 | Stop reason: HOSPADM

## 2021-12-28 RX ORDER — FENTANYL CITRATE 50 UG/ML
INJECTION, SOLUTION INTRAMUSCULAR; INTRAVENOUS
Status: DISCONTINUED
Start: 2021-12-28 | End: 2021-12-28 | Stop reason: HOSPADM

## 2021-12-28 RX ORDER — IOPAMIDOL 755 MG/ML
INJECTION, SOLUTION INTRAVASCULAR
Status: DISCONTINUED | OUTPATIENT
Start: 2021-12-28 | End: 2021-12-28 | Stop reason: HOSPADM

## 2021-12-28 RX ORDER — LORAZEPAM 0.5 MG/1
0.5 TABLET ORAL
Status: DISCONTINUED | OUTPATIENT
Start: 2021-12-28 | End: 2021-12-28 | Stop reason: HOSPADM

## 2021-12-28 RX ORDER — NITROGLYCERIN 5 MG/ML
VIAL (ML) INTRAVENOUS
Status: DISCONTINUED | OUTPATIENT
Start: 2021-12-28 | End: 2021-12-28 | Stop reason: HOSPADM

## 2021-12-28 RX ORDER — NALOXONE HYDROCHLORIDE 0.4 MG/ML
0.2 INJECTION, SOLUTION INTRAMUSCULAR; INTRAVENOUS; SUBCUTANEOUS
Status: ACTIVE | OUTPATIENT
Start: 2021-12-28 | End: 2021-12-28

## 2021-12-28 RX ORDER — POTASSIUM CHLORIDE 1500 MG/1
20 TABLET, EXTENDED RELEASE ORAL
Status: COMPLETED | OUTPATIENT
Start: 2021-12-28 | End: 2021-12-28

## 2021-12-28 RX ORDER — FLUMAZENIL 0.1 MG/ML
0.2 INJECTION, SOLUTION INTRAVENOUS
Status: ACTIVE | OUTPATIENT
Start: 2021-12-28 | End: 2021-12-28

## 2021-12-28 RX ORDER — NITROGLYCERIN 0.4 MG/1
0.4 TABLET SUBLINGUAL EVERY 5 MIN PRN
Status: DISCONTINUED | OUTPATIENT
Start: 2021-12-28 | End: 2021-12-29 | Stop reason: HOSPADM

## 2021-12-28 RX ORDER — LIDOCAINE HYDROCHLORIDE 10 MG/ML
INJECTION, SOLUTION EPIDURAL; INFILTRATION; INTRACAUDAL; PERINEURAL
Status: DISCONTINUED
Start: 2021-12-28 | End: 2021-12-28 | Stop reason: HOSPADM

## 2021-12-28 RX ORDER — ISOSORBIDE MONONITRATE 60 MG/1
60 TABLET, EXTENDED RELEASE ORAL DAILY
Status: DISCONTINUED | OUTPATIENT
Start: 2021-12-29 | End: 2021-12-29 | Stop reason: HOSPADM

## 2021-12-28 RX ORDER — FUROSEMIDE 40 MG
40 TABLET ORAL DAILY
Status: DISCONTINUED | OUTPATIENT
Start: 2021-12-29 | End: 2021-12-29 | Stop reason: HOSPADM

## 2021-12-28 RX ORDER — FUROSEMIDE 20 MG
20 TABLET ORAL DAILY
Status: DISCONTINUED | OUTPATIENT
Start: 2021-12-28 | End: 2021-12-29

## 2021-12-28 RX ORDER — SODIUM CHLORIDE 9 MG/ML
INJECTION, SOLUTION INTRAVENOUS CONTINUOUS
Status: DISCONTINUED | OUTPATIENT
Start: 2021-12-28 | End: 2021-12-28 | Stop reason: HOSPADM

## 2021-12-28 RX ORDER — ASPIRIN 81 MG/1
243 TABLET, CHEWABLE ORAL ONCE
Status: COMPLETED | OUTPATIENT
Start: 2021-12-28 | End: 2021-12-28

## 2021-12-28 RX ORDER — ACETAMINOPHEN 325 MG/1
650 TABLET ORAL EVERY 4 HOURS PRN
Status: DISCONTINUED | OUTPATIENT
Start: 2021-12-28 | End: 2021-12-29 | Stop reason: HOSPADM

## 2021-12-28 RX ADMIN — INSULIN ASPART 1 UNITS: 100 INJECTION, SOLUTION INTRAVENOUS; SUBCUTANEOUS at 10:30

## 2021-12-28 RX ADMIN — CEFAZOLIN SODIUM 2 G: 2 INJECTION, SOLUTION INTRAVENOUS at 14:15

## 2021-12-28 RX ADMIN — SODIUM CHLORIDE: 9 INJECTION, SOLUTION INTRAVENOUS at 10:38

## 2021-12-28 RX ADMIN — ISOSORBIDE MONONITRATE 30 MG: 30 TABLET, EXTENDED RELEASE ORAL at 08:43

## 2021-12-28 RX ADMIN — POTASSIUM CHLORIDE 20 MEQ: 1500 TABLET, EXTENDED RELEASE ORAL at 09:34

## 2021-12-28 RX ADMIN — INSULIN ASPART 1 UNITS: 100 INJECTION, SOLUTION INTRAVENOUS; SUBCUTANEOUS at 14:16

## 2021-12-28 RX ADMIN — CARVEDILOL 25 MG: 25 TABLET, FILM COATED ORAL at 17:53

## 2021-12-28 RX ADMIN — ATORVASTATIN CALCIUM 40 MG: 40 TABLET, FILM COATED ORAL at 19:55

## 2021-12-28 RX ADMIN — CARVEDILOL 25 MG: 25 TABLET, FILM COATED ORAL at 08:43

## 2021-12-28 RX ADMIN — LISINOPRIL 2.5 MG: 2.5 TABLET ORAL at 08:43

## 2021-12-28 RX ADMIN — NITROGLYCERIN 0.4 MG: 0.4 TABLET SUBLINGUAL at 09:34

## 2021-12-28 RX ADMIN — INSULIN GLARGINE 22 UNITS: 100 INJECTION, SOLUTION SUBCUTANEOUS at 08:50

## 2021-12-28 RX ADMIN — CEFAZOLIN SODIUM 2 G: 2 INJECTION, SOLUTION INTRAVENOUS at 05:37

## 2021-12-28 RX ADMIN — CEFAZOLIN SODIUM 2 G: 2 INJECTION, SOLUTION INTRAVENOUS at 22:05

## 2021-12-28 RX ADMIN — ASPIRIN 81 MG CHEWABLE TABLET 81 MG: 81 TABLET CHEWABLE at 08:43

## 2021-12-28 RX ADMIN — PANTOPRAZOLE SODIUM 40 MG: 40 TABLET, DELAYED RELEASE ORAL at 08:43

## 2021-12-28 RX ADMIN — LEVOTHYROXINE SODIUM 137 MCG: 137 TABLET ORAL at 08:43

## 2021-12-28 RX ADMIN — CLOPIDOGREL BISULFATE 75 MG: 75 TABLET ORAL at 09:34

## 2021-12-28 RX ADMIN — ASPIRIN 81 MG CHEWABLE TABLET 243 MG: 81 TABLET CHEWABLE at 09:34

## 2021-12-28 RX ADMIN — GABAPENTIN 600 MG: 300 CAPSULE ORAL at 22:05

## 2021-12-28 RX ADMIN — HUMAN ALBUMIN MICROSPHERES AND PERFLUTREN 3 ML: 10; .22 INJECTION, SOLUTION INTRAVENOUS at 09:23

## 2021-12-28 RX ADMIN — FUROSEMIDE 20 MG: 20 TABLET ORAL at 15:42

## 2021-12-28 RX ADMIN — SODIUM CHLORIDE 75 ML/HR: 9 INJECTION, SOLUTION INTRAVENOUS at 14:15

## 2021-12-28 ASSESSMENT — MIFFLIN-ST. JEOR: SCORE: 2006.5

## 2021-12-28 NOTE — UTILIZATION REVIEW
Concurrent stay review; Secondary Review Determination     [facility]          Under the authority of the Utilization Management Committee, the utilization review process indicated a secondary review on the above patient.  The review outcome is based on review of the medical records, discussions with staff, and applying clinical experience noted on the date of the review.          (x) Observation Status Appropriate - Concurrent stay review    RATIONALE FOR DETERMINATION   71 year old male admitted on 12/27/2021. He has a past medical history significant for diabetes mellitus type 2, atrial fibrillation, coronary artery disease, hypertension, hyperlipidemia, congestive heart failure, previous stroke, peripheral neuropathy, and sleep apnea.  He presented to emergency room with chest pain.  Symptoms concerning for unstable angina.   EKG and troponin are negative patient undergoing work-up including coronary angiogram today. There is no other indication for prolonged inpatient hospital care. Observation care is ordered by the attending physician. The severity of illness, intensity of service provided, expected LOS and risk for adverse outcome make the care appropriate for observation.      This document was produced using voice recognition software       The information on this document is developed by the utilization review team in order for the business office to ensure compliance.  This only denotes the appropriateness of proper admission status and does not reflect the quality of care rendered.         The definitions of Inpatient Status and Observation Status used in making the determination above are those provided in the CMS Coverage Manual, Chapter 1 and Chapter 6, section 70.4.      Sincerely,     TOM WAGNER MD    System Medical Director  Utilization Management  [facility].

## 2021-12-28 NOTE — ED NOTES
[facility]  ED Nurse Handoff Report    Jayro Elder is a 71 year old male   ED Chief complaint: Chest Pain  . ED Diagnosis:   Final diagnoses:   Chest pain   Dyspnea   Weakness   Lightheadedness   Cellulitis of toe of left foot     Allergies:   Allergies   Allergen Reactions     No Known Allergies        Code Status: Full Code  Activity level - Baseline/Home:  Independent. Activity Level - Current:   Stand by Assist. Lift room needed: No. Bariatric: No   Needed: No   Isolation: No. Infection: Not Applicable.     Vital Signs:   Vitals:    12/27/21 1500 12/27/21 1600 12/27/21 1615 12/27/21 1700   BP: 122/76 135/74 115/72 (!) 149/82   Pulse: 81 80 86 77   Resp:       Temp:       SpO2: 97% 97% 96% 99%       Cardiac Rhythm:  ,      Pain level:    Patient confused: No. Patient Falls Risk: No.   Elimination Status: Has voided   Patient Report - Initial Complaint: Chest Pain. Focused Assessment: Jayro Elder is a 71 year old male who presents with chest pain. Three days ago when walking to his mailbox he began to experience mid upper chest pain which has been constant since onset. The pain is described as tightness and has radiated up his neck and into his back. The patient notes the chest pain is worse with movement and decreases when laying down. He additionally has felt short of breath, light headed, weak, and fatigued. The patient also has some baseline bilateral lower extremity swelling which is worse than typical.  He notes he has chronic burning neuropathic pain is unchanged.  He notes that he cannot see down to his leg so is unable to tell if there is any color changes.  He reports multiple toe infections.  Tests Performed:   Labs Ordered and Resulted from Time of ED Arrival to Time of ED Departure   COMPREHENSIVE METABOLIC PANEL - Abnormal       Result Value    Sodium 135      Potassium 3.2 (*)     Chloride 100      Carbon Dioxide (CO2) 29      Anion Gap 6      Urea Nitrogen 21       Creatinine 1.22      Calcium 8.3 (*)     Glucose 212 (*)     Alkaline Phosphatase 118      AST 19      ALT 38      Protein Total 7.3      Albumin 3.4      Bilirubin Total 1.7 (*)     GFR Estimate 63     NT PROBNP INPATIENT - Abnormal    N terminal Pro BNP Inpatient 1,797 (*)    CBC WITH PLATELETS AND DIFFERENTIAL - Abnormal    WBC Count 8.4      RBC Count 3.67 (*)     Hemoglobin 10.9 (*)     Hematocrit 33.1 (*)     MCV 90      MCH 29.7      MCHC 32.9      RDW 13.8      Platelet Count 212      % Neutrophils 82      % Lymphocytes 9      % Monocytes 8      % Eosinophils 1      % Basophils 0      % Immature Granulocytes 0      NRBCs per 100 WBC 0      Absolute Neutrophils 6.9      Absolute Lymphocytes 0.7 (*)     Absolute Monocytes 0.7      Absolute Eosinophils 0.1      Absolute Basophils 0.0      Absolute Immature Granulocytes 0.0      Absolute NRBCs 0.0     INFLUENZA A/B & SARS-COV2 PCR MULTIPLEX - Normal    Influenza A PCR Negative      Influenza B PCR Negative      SARS CoV2 PCR Negative     TROPONIN I - Normal    Troponin I High Sensitivity 19       XR Toe Left G/E 2 Views   Final Result   IMPRESSION: Soft tissue swelling of the great toe. No evidence of acute fracture. Mild to moderate first MTP degenerative changes. Mild irregularity of the medial aspect of the head of the first metatarsal could be chronic erosive change. Recommend    clinical correlation for history of gout.      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      No acute lytic or destructive lesions. Moderate midfoot degenerative changes.      XR Chest 2 Views   Final Result   IMPRESSION: There are no acute infiltrates. The cardiac silhouette is   not enlarged. Pulmonary vasculature is unremarkable.      YE GAONA MD            SYSTEM ID:  [id number]          Treatments provided:See EPIC  Family Comments: NA  OBS brochure/video discussed/provided to patient:  Yes  ED Medications: Medications - No data to display  Drips infusing:   No  For the majority of the shift, the patient's behavior Green. Interventions performed were NA.    Sepsis treatment initiated: No     Patient tested for COVID 19 prior to admission: NO    ED Nurse Name/Phone Number: Mary Jo Wesley RN,   6:09 PM  RECEIVING UNIT ED HANDOFF REVIEW    Above ED Nurse Handoff Report was reviewed: Yes  Reviewed by: Skye Soto RN on December 27, 2021 at 11:22 PM

## 2021-12-28 NOTE — PRE-PROCEDURE
GENERAL PRE-PROCEDURE:   Procedure:  Heart catheterization possible intervention  Date/Time:  12/28/2021 12:21 PM    Written consent obtained?: Yes    Risks and benefits: Risks, benefits and alternatives were discussed    Consent given by:  Patient  Patient states understanding of procedure being performed: Yes    Patient's understanding of procedure matches consent: Yes    Procedure consent matches procedure scheduled: Yes    Expected level of sedation:  Moderate  Appropriately NPO:  Yes  ASA Class:  3  Lungs:  Lungs clear with good breath sounds bilaterally  Heart:  Normal heart sounds and rate  History & Physical reviewed:  History and physical reviewed and no updates needed  Statement of review:  I have reviewed the lab findings, diagnostic data, medications, and the plan for sedation  risk benefit indication of cath discussed with pt  All questions answered, he wishes to proceed  Reviewed with him his prior cath result  Note-progressive anemia should be evaluated  Pt says no blood in stool etc  k replaced

## 2021-12-28 NOTE — PHARMACY-ADMISSION MEDICATION HISTORY
Admission medication history interview status for this patient is complete. See [facility] admission navigator for allergy information, prior to admission medications and immunization status.     Medication history interview done, indicate source(s): Patient  Medication history resources (including written lists, pill bottles, clinic record):[employer] med list and [employer]  Pharmacy: [facility]    Changes made to PTA medication list:  Added: none  Changed: none  Reported as Not Taking: -  Removed: potassium, vit D, ferrous sulfate, Zyrtec    Actions taken by pharmacist (provider contacted, etc):None     Additional medication history information:None    Medication reconciliation/reorder completed by provider prior to medication history?  NO   (Y/N)     For patients on insulin therapy:   Do you use sliding scale insulin based on blood sugars? Yes  What is your pre-meal insulin coverage?  12 units with bkft & Lunch, 14 units at dinner  Do you typically eat three meals a day? Yes  How many times do you check your blood glucose per day?   How many episodes of hypoglycemia do you typically have per month? <1   Do you have a Continuous Glucose Monitor (CGM)?  Yes, Free style moises    Prior to Admission medications    Medication Sig Last Dose Taking? Auth Provider   ammonium lactate (AMLACTIN) 12 % external cream Apply topically 2 times daily Past Week at Unknown time Yes Kwasi Root DPM   apixaban ANTICOAGULANT (ELIQUIS) 5 MG tablet Take 1 tablet (5 mg) by mouth 2 times daily 12/27/2021 at am Yes Gill Doty PA   atorvastatin (LIPITOR) 40 MG tablet Take 1 tablet (40 mg) by mouth every evening 12/27/2021 at am Yes Livan King MD   carvedilol (COREG) 25 MG tablet Take 1 tablet (25 mg) by mouth 2 times daily (with meals) 12/27/2021 at am Yes Gael Christensen NP   clopidogrel (PLAVIX) 75 MG tablet Take 1 tablet (75 mg) by mouth daily 12/27/2021 at an Yes Gael Christensen NP   furosemide (LASIX) 40 MG tablet  Take 1 tablet (40 mg) in the morning and 1/2 tablet (20 mg) in the afternoon 12/27/2021 at am Yes Gael Christensen NP   gabapentin (NEURONTIN) 300 MG capsule TAKE 2-3 CAPSULES (600-900 MG) BY MOUTH AT BEDTIME 12/26/2021 at 2caps hs Yes Davion Cordova PA-C   insulin aspart (NOVOLOG FLEXPEN) 100 UNIT/ML pen Inject Subcutaneous 3 times daily (with meals) Sliding Scale  Do not give sliding scale insulin if Pre-Meal BG less than 140.   For Pre-Meal:   - 200 give 2 units.    - 250 give 4 units.    - 300 give 6 units.    - 350 give 8 units.    - 400 give 10 units. 12/26/2021 at Unknown time Yes Unknown, Entered By History   insulin aspart (NOVOLOG PEN) 100 UNIT/ML pen Inject 12 Units Subcutaneous 2 times daily (with meals) With breakfast and lunch 12/27/2021 at am Yes Mel Perez PA-C   insulin aspart (NOVOLOG PEN) 100 UNIT/ML pen Inject 14 Units Subcutaneous daily (with dinner) 12/26/2021 at pm Yes Mel Perez PA-C   insulin glargine (LANTUS PEN) 100 UNIT/ML pen Inject 44 units subcutaneously once every morning 12/27/2021 at am Yes Unknown, Entered By History   isosorbide mononitrate (IMDUR) 30 MG 24 hr tablet Take 1 tablet (30 mg) by mouth daily 12/27/2021 at am Yes Gael Christensen NP   levothyroxine (SYNTHROID, LEVOTHROID) 137 MCG tablet Take 1 tablet by mouth once every evening 12/26/2021 at pm Yes Henrry Figueroa PA-C   lisinopril (ZESTRIL) 2.5 MG tablet Take 1 tablet (2.5 mg) by mouth daily 12/27/2021 at am Yes Gael Christensen NP   pantoprazole (PROTONIX) 40 MG EC tablet TAKE 1 TABLET BY MOUTH EVERY MORNING BEFORE BREAKFAST 12/27/2021 at am Yes Davion Cordova PA-C   triamcinolone (KENALOG) 0.1 % external cream Apply topically 2 times daily Past Week at Unknown time Yes Davion Cordova PA-C   Continuous Blood Gluc  ([employer]eng[employer]o CLARITA 2 READER SYSTM) DRAGAN 1 Device daily   Mel Perez PA-C   Continuous  Blood Gluc Sensor (FREESTYLE CLARITA 2 SENSOR SYSTM) MISC 1 Units 4 times daily (before meals and nightly)   Mel Perez PA-C   insulin pen needle (31G X 6 MM) 31G X 6 MM miscellaneous Use  as directed.   Sarah Bolivar MD   nitroGLYcerin (NITROSTAT) 0.4 MG sublingual tablet For chest pain place 1 tablet under the tongue every 5 minutes for 3 doses. If symptoms persist 5 minutes after 1st dose call 911.   Davion Perez PA-C

## 2021-12-28 NOTE — PROGRESS NOTES
KRYSTAL giving report:Valarie RICE receiving report:      S:  Procedure performed: Cor Angio with left ventriculogram  B:  Why procedure needed:  admitted with Chest pain    A:  Assessment of area:  All arteries with open stents and no blockages needing interventions  R:  Recommendations:  Medical management of chest pain  Right groin access- pulled and manual hold  Hemostasis at 1345- sl hematoma at site  OOB after 3hrs-    Family updated: per PT-    Jaw pain with pressure on groin-O2 resumed- MD aware

## 2021-12-28 NOTE — CONSULTS
[facility]    CARDIOLOGY CONSULT    Date of Admission:  12/27/2021  Date of Consult: December 28, 2021    ASSESSMENT:  71-year-old male with extensive CAD and previous stenting is seen for 2 weeks of exertional unstable angina.  His symptoms are quite convincing for angina, the chest pain comes on with light walking, it is relieved with rest.  EKG shows no acute changes, troponins have been negative.    We talked about further evaluation, coronary angiogram was recommended.  A stress test would probably not be sensitive enough to detect any new stenosis with his extensive history.  The risk and benefit of the procedure was explained in detail, he understands and wishes to proceed.    He does have a little redness to his left great toe, but this does not appear to be a severe cellulitis.  He has no fever and no elevated white count, it should be OK for angiogram.    His last dose of Eliquis was the morning of December 27.    RECOMMENDATIONS:  1.  CAD with 2 weeks of exertional chest pain, suspicious for angina  -Coronary angiogram today  -Continue clopidogrel, also start aspirin  -Echocardiogram pending  -Continue heparin until Cath Lab  -Continue other cardiac medications    2.  A. Fib  -Rates controlled  - Holding Eliquis for angiogram    Livan King MD  Cardiology [facility] Heart  Pager:  [phone]  Text Page  December 28, 2021    Addendum:  Angiogram today shows moderate disease, no significant change from April, no intervention needed. Unclear what is causing his chest pain. EDP was 19, mild fluid retention potentially could have caused a little angina. Symptoms may not be cardiac either. Imdur will be increased, otherwise he is on good medical therapy.    Plan to observe overnight, likely home tomorrow if symptom-free.    Livna King MD  Cardiology - [facility] Heart  Pager: [phone]  Text Page  December 28, 2021    CODE STATUS:  Full Code    REASON FOR CONSULT: CAD, chest  pain    PRIMARY CARE PHYSICIAN:  Physician No Ref-Primary    HISTORY OF PRESENT ILLNESS:  71 year old male with known coronary disease is seen for chest pain.  He has A. fib, diabetes type 2, hypertension, CVA, sleep apnea, CKD.    2005 he had an MI with EF of 35 to 40%, history of stenting of the LAD and OM.  July 2019 he underwent 3 stents to the RCA.  10 stents total.    Echo April 2021 showed EF 40 to 45% with mild global hypokinesis and septal akinesis, no significant valve disease.  Angiogram then showed three-vessel disease with mild diffuse 50% in-stent restenosis and focal 75% in-stent restenosis of the mid LAD with FFR 0.87, 30% proximal RCA, 40% mid in-stent restenosis of the RCA, 30% distal RCA in-stent restenosis, 25% OM3 in-stent restenosis, no intervention performed.    He had been doing fairly well from a cardiac perspective in recent months.  His walking is somewhat limited by previous toe amputation.  He will go short distances, but does nothing strenuous.  He denies any lightheadedness, dizziness, or syncope.    In the past 2 weeks he has had exertional chest pain in the upper chest and neck.  This comes on walking to his mailbox and doing light activity around his home.  Symptoms resolved with rest.  He has not tried nitro.  He has had a few episodes of resting pain, but mostly exertional.    He presented to the ED, he was hemodynamically stable.  EKG showed no changes and troponins have been negative.  He had a little discomfort overnight, but none this morning.  Had some redness in his left toe, antibiotics were started for possible cellulitis.    PAST MEDICAL HISTORY:  I have reviewed this patient's medical history and updated it with pertinent information if needed.   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      Cardiomyopathy (H)      Cellulitis of left lower extremity 3/13/2018     Cerebral infarction (H)     eye sight decreased in peripheral of right side and blurry     Cerebral infarction (H) 2016     Diabetic ulcer of left midfoot associated with type 2 diabetes mellitus, with fat layer exposed (H) 3/13/2018     Essential hypertension 11/13/2002     Essential hypertension, benign 11/13/2002     Generalized osteoarthrosis, unspecified site 11/13/2002     Lightheadedness 12/27/2021     Microalbuminuria 3/13/2018    X1     Myocardial infarction (H)      Myocardial infarction (H) 06/28/2005     Neuropathy      Neuropathy 2016     Sepsis (H)      Sleep apnea     doesn't use it     Sleep apnea 2006     Substance abuse (H)      Substance abuse (H)      Syncope, unspecified syncope type 3/13/2018     Syncope, unspecified syncope type 03/13/2018     Thyroid disease     takes medicine     Thyroid disease 2005     Tobacco use disorder 11/13/2002     Type 2 diabetes mellitus (H) 2000     Type 2 diabetes mellitus with diabetic peripheral angiopathy without gangrene, unspecified long term insulin use status 2005     PAST SURGICAL HISTORY:  I have reviewed this patient's surgical history and updated it with pertinent information if needed.  Past Surgical History:   Procedure Laterality Date     AMPUTATE TOE(S) Right 1/6/2019    Procedure: Right open second toe partial amputation;  Surgeon: Sabino Garcia DPM;  Location: [facility] OR     AMPUTATE TOE(S) Right 1/8/2019    Procedure: 1.  Revision right second toe amputation with resection of distal half of proximal phalanx with full closure.    2.  Debridement of callus/preulcerative lesion, distal left second toe and plantar left first metatarsophalangeal joint.;  Surgeon: Ryan Bhagat DPM;  Location: [facility] OR     AMPUTATE TOE(S) Right 3/12/2019    Procedure: Partial right fourth toe amputation.;  Surgeon: Ryan Bhagat DPM;  Location: [facility] OR     AMPUTATE TOE(S) Right  5/6/2019    Procedure: Right partial third toe amputation;  Surgeon: Татьяна Mckeon DPM, Podiatry/Foot and Ankle Surgery;  Location: [facility] OR     AMPUTATE TOE(S) Left 4/18/2020    Procedure: LEFT SECOND TOE AMPUTATION;  Surgeon: Ryan Bhagat DPM;  Location: [facility] OR     AMPUTATE TOE(S) Left 5/8/2021    Procedure: 1.  Amputation of the left third toe at the level of the proximal interphalangeal joint. 2.  Excisional debridement of less than 20 square cm down to the level of the deeper skin, plantar left foot.;  Surgeon: Kwasi Root DPM;  Location: [facility] OR     AMPUTATE TOE(S) Right 2019    4 toes amputation      ANGIOGRAM  6/29/05    subtotal occ.mid LAD,SUSAN mid LAD     ANGIOGRAM  7/05    mild CAD,patent stent,no flow-limiting lesions,sev.LV dysf.LAD enlarged     ANGIOGRAM  2/09    Sev.single vessel disease,Mod LV dysf.distal LAD 90%,70-75% mid lad just before prev stent,SUSAN to prox.mid LAD, endeavor to distal LAD     ANGIOGRAM  11/13/13    restenosis, stent LAD     BACK SURGERY      back surgery x3     CARDIAC CATHETERIZATION  07/08/2019     CARDIAC CATHETERIZATION Left 06/13/2017     CARDIAC CATHETERIZATION  2005,2009,20013     CORONARY STENT PLACEMENT  07/08/2019     CV CORONARY ANGIOGRAM N/A 7/8/2019    Procedure: Coronary Angiogram;  Surgeon: Jose Alfredo Crockett MD;  Location: [facility] CARDIAC CATH LAB     CV CORONARY ANGIOGRAM N/A 4/9/2021    Procedure: CV CORONARY ANGIOGRAM;  Surgeon: Warren Paulson MD;  Location: [facility] CARDIAC CATH LAB     CV LEFT HEART CATH N/A 7/8/2019    Procedure: Left Heart Cath;  Surgeon: Jose Alfredo Crockett MD;  Location:  HEART CARDIAC CATH LAB     CV PCI ASPIRATION THROMECTOMY N/A 7/8/2019    Procedure: PCI Aspiration Thrombectomy;  Surgeon: Jose Alfredo Crockett MD;  Location:  HEART CARDIAC CATH LAB     CV PCI STENT DRUG ELUTING N/A 7/8/2019    Procedure: PCI Stent Drug Eluting;  Surgeon: Jose Alfredo Crockett MD;  Location:  HEART CARDIAC CATH LAB     HEART CATH LEFT  HEART CATH  2017    SUSAN to OM3     INCISION AND DRAINAGE TOE, COMBINED Bilateral 2018    Procedure: COMBINED INCISION AND DRAINAGE TOE;  1) Irrigation and debridement ulcer left great toe  2) Amputation 3rd toe right foot at distal interphalangeal joint level;  Surgeon: Miki Harp MD;  Location:  OR     IRRIGATION AND DEBRIDEMENT FOOT, COMBINED Left 3/12/2019    Procedure: Debridement of left foot ulcer sub first MPJ 2 x 2.5 cm down to and including subcutaneous tissue, callus debridement for preulcerative lesion, left second toe.;  Surgeon: Ryan Bhagat DPM;  Location:  OR     ORTHOPEDIC SURGERY      bunion surgery both feet     ORTHOPEDIC SURGERY      left shoulder     OTHER SURGICAL HISTORY  9 years old    cancer tumor mouth     SHOULDER ARTHROSCOPY W/ ROTATOR CUFF REPAIR Left      SOFT TISSUE SURGERY      debridement of toe numerous time     VASCULAR SURGERY      7 stents in heart     VASCULAR SURGERY       [facility] NONSPECIFIC PROCEDURE      Laminectomy x 3 - (1983 x 2 & )     [facility] NONSPECIFIC PROCEDURE Bilateral     Bunionectomy     [facility] NONSPECIFIC PROCEDURE  1959    Gingival surgery at age 9       HOME MEDICATIONS:  Prior to Admission Medications   Prescriptions Last Dose Informant Patient Reported? Taking?   Continuous Blood Gluc  (Edgar OnlineSTYLE CLARITA 2 READER SYSTM) DRAGAN   No No   Si Device daily   Continuous Blood Gluc Sensor (FREESTYLE CLARITA 2 SENSOR SYSTM) MISC   No No   Si Units 4 times daily (before meals and nightly)   ammonium lactate (AMLACTIN) 12 % external cream Past Week at Unknown time  No Yes   Sig: Apply topically 2 times daily   apixaban ANTICOAGULANT (ELIQUIS) 5 MG tablet 2021 at am  No Yes   Sig: Take 1 tablet (5 mg) by mouth 2 times daily   atorvastatin (LIPITOR) 40 MG tablet 2021 at am  No Yes   Sig: Take 1 tablet (40 mg) by mouth every evening   carvedilol (COREG) 25 MG tablet 2021 at am  No Yes   Sig: Take 1 tablet  (25 mg) by mouth 2 times daily (with meals)   clopidogrel (PLAVIX) 75 MG tablet 12/27/2021 at an  No Yes   Sig: Take 1 tablet (75 mg) by mouth daily   furosemide (LASIX) 40 MG tablet 12/27/2021 at am  No Yes   Sig: Take 1 tablet (40 mg) in the morning and 1/2 tablet (20 mg) in the afternoon   gabapentin (NEURONTIN) 300 MG capsule 12/26/2021 at 2caps hs  No Yes   Sig: TAKE 2-3 CAPSULES (600-900 MG) BY MOUTH AT BEDTIME   insulin aspart (NOVOLOG FLEXPEN) 100 UNIT/ML pen 12/26/2021 at Unknown time  Yes Yes   Sig: Inject Subcutaneous 3 times daily (with meals) Sliding Scale  Do not give sliding scale insulin if Pre-Meal BG less than 140.   For Pre-Meal:   - 200 give 2 units.    - 250 give 4 units.    - 300 give 6 units.    - 350 give 8 units.    - 400 give 10 units.   insulin aspart (NOVOLOG PEN) 100 UNIT/ML pen 12/27/2021 at am  No Yes   Sig: Inject 12 Units Subcutaneous 2 times daily (with meals) With breakfast and lunch   insulin aspart (NOVOLOG PEN) 100 UNIT/ML pen 12/26/2021 at pm  No Yes   Sig: Inject 14 Units Subcutaneous daily (with dinner)   insulin glargine (LANTUS PEN) 100 UNIT/ML pen 12/27/2021 at am  Yes Yes   Sig: Inject 44 units subcutaneously once every morning   insulin pen needle (31G X 6 MM) 31G X 6 MM miscellaneous   No No   Sig: Use  as directed.   isosorbide mononitrate (IMDUR) 30 MG 24 hr tablet 12/27/2021 at am  No Yes   Sig: Take 1 tablet (30 mg) by mouth daily   levothyroxine (SYNTHROID, LEVOTHROID) 137 MCG tablet 12/26/2021 at pm Self Yes Yes   Sig: Take 1 tablet by mouth once every evening   lisinopril (ZESTRIL) 2.5 MG tablet 12/27/2021 at am  No Yes   Sig: Take 1 tablet (2.5 mg) by mouth daily   nitroGLYcerin (NITROSTAT) 0.4 MG sublingual tablet   No No   Sig: For chest pain place 1 tablet under the tongue every 5 minutes for 3 doses. If symptoms persist 5 minutes after 1st dose call 911.   pantoprazole (PROTONIX) 40 MG EC tablet 12/27/2021 at am  No Yes   Sig:  TAKE 1 TABLET BY MOUTH EVERY MORNING BEFORE BREAKFAST   triamcinolone (KENALOG) 0.1 % external cream Past Week at Unknown time  No Yes   Sig: Apply topically 2 times daily      Facility-Administered Medications: None       ALLERGIES:  Allergies   Allergen Reactions     No Known Allergies        SOCIAL HISTORY:  I have reviewed this patient's social history and updated it with pertinent information if needed. Jayro Elder  reports that he quit smoking about 16 years ago. His smoking use included cigarettes. He started smoking about 42 years ago. He has a 25.00 pack-year smoking history. He has never used smokeless tobacco. He reports current alcohol use of about 4.0 standard drinks of alcohol per week. He reports that he does not use drugs.    FAMILY HISTORY:  I have reviewed this patient's family history and updated it with pertinent information if needed.   Family History   Problem Relation Age of Onset     Cancer - colorectal Sister      Thyroid Disease Brother      Cardiovascular Brother      Diabetes Brother      Cancer Father         tumor in chest and throat     Arthritis Mother      Thyroid Disease Mother      Diabetes Mother      Glaucoma No family hx of      Macular Degeneration No family hx of        REVIEW OF SYSTEMS:  Constitutional:  No weight loss, fever, chills, weakness or fatigue.  HEENT:  Eyes:  No visual loss, blurred vision, double vision or yellow sclerae. No hearing loss, sneezing, congestion, runny nose or sore throat.  Skin:  No rash or itching.  Cardiovascular: per HPI  Respiratory: per HPI  GI:  No anorexia, nausea, vomiting or diarrhea. No abdominal pain or blood.  :  No dysurea, hematuria  Neurologic:  No headache, dizziness, syncope, paralysis, ataxia, numbness or tingling in the extremities. No change in bowel or bladder control.  Musculoskeletal:  No muscle, back pain, joint pain or stiffness.  Hematologic:  No anemia, bleeding or bruising.  Lymphatics:  No enlarged nodes. No  history of splenectomy.  Psychiatric:  No history of depression or anxiety.  Endocrine:  No reports of sweating, cold or heat intolerance. No polyuria or polydipsia.  Allergies:  No history of asthma, hives, eczema or rhinitis.    PHYSICAL EXAM:  Temp: 98.2  F (36.8  C) Temp src: Oral BP: (!) 159/83 Pulse: 73   Resp: 18 SpO2: 98 % O2 Device: None (Room air)    Vital Signs with Ranges  Temp:  [97.4  F (36.3  C)-98.2  F (36.8  C)] 98.2  F (36.8  C)  Pulse:  [69-86] 73  Resp:  [18] 18  BP: (110-159)/(68-83) 159/83  SpO2:  [96 %-99 %] 98 %  117 lbs 0 oz    Constitutional: awake, alert, no distress  Eyes: PERRL, sclera nonicteric  ENT: trachea midline  Respiratory: Lungs clear  Cardiovascular: Regular rate, totally irregular rhythm, no murmurs  GI: nondistended, nontender, bowel sounds present  Lymph/Hematologic: no lymphadenopathy  Skin: Left great toe has callus on the lateral side, very minimal erythema  Musculoskeletal: good muscle tone, strength 5/5 in upper and lower extremities  Neurologic: no focal deficits  Neuropsychiatric: appropriate affact    DATA:  Labs: Potassium 3.4, creatinine 1.2, LFTs normal, NT proBNP 1800, troponin 19, 21, hemoglobin 11, platelets 212, Covid and influenza negative  Recent Labs   Lab Test 04/08/21  0426 08/13/20  1028 06/30/16  0849 02/02/15  1016 11/14/13  0710   CHOL 79 107   < > 99 103   HDL 36* 45   < > 43 30*   LDL 33 53   < > 42 57   TRIG 52 44   < > 68 82   CHOLHDLRATIO  --   --   --  2.3 3.5    < > = values in this interval not displayed.     EKG: December 27: A. fib, rate 92, right bundle branch block, anteroseptal Q wave, left axis deviation.  No significant changes from April 8, 2021.    Tele: A. fib, rate 80    Echo: Pending

## 2021-12-28 NOTE — PROGRESS NOTES
[facility]    Hospitalist Progress Note  Name: Jayro Elder    MRN: [id number]  Provider:  Meredith Bernard PA-C  Date of Service: 12/28/2021    Assessment & Plan   Summary of Stay: Jayro Elder is a 71 year old male admitted on 12/27/2021. He has a past medical history significant for diabetes mellitus type 2, atrial fibrillation, coronary artery disease, hypertension, hyperlipidemia, congestive heart failure, previous stroke, peripheral neuropathy, and sleep apnea. Patient presented to the ED on 12/27/21 with a 2 week history of exertional chest pain concerning for unstable angina.    #Chest pain, concern for unstable angina: known CAD with previous stenting, follows with Dr. King of cardiology as outpatient. Presented with chest pain occurring with light walking and relieved with rest. EKG without ischemic changes and troponins x2 negative.   - mild chest pain up to 2-3/10 at rest this morning with improvement but not complete resolution with sublingual nitroglycerin   - monitor on telemetry  - cardiology consulted, recommended coronary angiogram performed today showing no changes since last coronary angiogram in 04/2021  - continue PTA Plavix and add ASA  - initially on IV Heparin gtt, discontinue after coronary angiogram  - echocardiogram on 12/28 shows EF of 41%, apex and mid to distal septum are severely hypokinetic, moderate global hypokinesis, diastolic doppler findings suggest LV filling pressures are increased, moderately decreased RV systolic function, and severe pulmonary hypertension present   - continue PTA Lisinopril, Lasix, Atorvastatin, and Carvedilol  - discussed with cardiology, based on angiogram results will increase Imdur to 60 mg daily     #DM2 with peripheral neuropathy: BG stable in 160-180s over last day  - received Lantus 22 units prior to procedure  - resume PTA Lantus 44 units in AM  - medium sliding scale insulin  - blood glucose checks every 4 hours while NPO then  TID with meals and at bedtime     #Left first toe erythema: appears partially chronic related to PAD, recently evaluated at [place] by vascular medicine where it is noted that the patient has a callus over his left great toe with ecchymosis beneath that was bluntly removed in office and no blood or ulcer present underneath. Mild erythema noted on exam today, but no associated warmth or swelling or extension outside of left great toe.   - low suspicion for cellulitis, will discontinue IV Ancef  - no history of gout and uric acid only mildly elevated at 9.1 as well no significant pain    - recommend elevating toe and reassess in AM    #Anemia: Hgb of 9.9 today which is slightly lower than baseline Hgb of 10-11, continue to monitor with being on Plavix and Apixaban as outpatient   - recheck Hgb in AM    #Hypothyroidism: resume Levothyroxine     #HTN: BP intermittently elevated, continue PTA Lisinopril, Carvedilol, and Imdur at increased dose     #GERD: resume PTA Protonix daily    #Hypokalemia: initial potassium of 3.2, replace per protocol     #CKD stage II: baseline creatinine of 1.2, stable  - avoid nephrotoxins     #A-fib: rate controlled, in A-fib on telemetry  - Apixaban initially held on 12/28 with plans for coronary angiogram, resume when cleared by cardiology to do so, likely in AM  - continue PTA Carvedilol   - monitor on telemetry     DVT Prophylaxis: was on Heparin gtt and will plan to resume Apixaban   Code Status: Full Code  Disposition: Expected discharge tomorrow pending no additional chest pain overnight and reassessment of left great toe    Meredith Bernard PA-C  [facility]    Interval History   Patient reports mild central chest pain this morning with radiation into his throat that was 2-3 down to 1 after receiving sublingual nitroglycerin. Denies associated shortness of breath. Denies prior h/o gout. Discussed plan for coronary angiogram later today.    -Data reviewed today:  I reviewed all new labs and imaging reports over the last 24 hours.  Physical Exam   Temp: 98.7  F (37.1  C) Temp src: Oral BP: 135/80 Pulse: 77   Resp: 20 SpO2: 97 % O2 Device: Nasal cannula Oxygen Delivery: 2 LPM  Vitals:    12/27/21 2358 12/28/21 1059   Weight: 53.1 kg (117 lb) 115 kg (253 lb 8.5 oz)     Vital Signs with Ranges  Temp:  [97.4  F (36.3  C)-98.7  F (37.1  C)] 98.7  F (37.1  C)  Pulse:  [69-86] 77  Resp:  [16-20] 20  BP: (110-159)/(68-86) 135/80  SpO2:  [96 %-99 %] 97 %  No intake/output data recorded.    GEN:  Alert, oriented x 3, appears comfortable, NAD.  HEENT:  Normocephalic/atraumatic, no scleral icterus, no nasal discharge, mouth moist.  CV: rate controlled, irregularly irregular, no murmur or JVD.  S1 + S2 noted, no S3 or S4.  LUNGS:  Clear to auscultation bilaterally without rales/rhonchi/wheezing/retractions.  Symmetric chest rise on inhalation noted.  ABD:  Active bowel sounds, soft, non-tender/non-distended.  No rebound/guarding/rigidity.  EXT: Mild erythema of left great toe with callus over medial aspect with underlying bruising, no warmth over area. No pitting edema.  No cyanosis.  No acute joint synovitis noted.  SKIN:  Dry to touch, no exanthems noted in the visualized areas.    Medications     heparin Stopped (12/28/21 1145)     sodium chloride         aspirin  81 mg Oral Daily     atorvastatin  40 mg Oral QPM     carvedilol  25 mg Oral BID w/meals     ceFAZolin  2 g Intravenous Q8H     clopidogrel  75 mg Oral Daily     gabapentin  600 mg Oral At Bedtime     insulin aspart  1-6 Units Subcutaneous Q4H     isosorbide mononitrate  30 mg Oral Daily     levothyroxine  137 mcg Oral Daily     lisinopril  2.5 mg Oral Daily     pantoprazole  40 mg Oral QAM AC     Data   Results for orders placed or performed during the hospital encounter of 12/27/21   XR Chest 2 Views     Status: None    Narrative    CHEST TWO VIEWS 12/27/2021 3:47 PM     HISTORY: Chest pain.    COMPARISON: 4/7/2021.       Impression    IMPRESSION: There are no acute infiltrates. The cardiac silhouette is  not enlarged. Pulmonary vasculature is unremarkable.    YE GAONA MD         SYSTEM ID:  [id number]   XR Toe Left G/E 2 Views     Status: None    Narrative    EXAM: XR TOE LEFT G/E 2 VIEWS  LOCATION: [facility]  DATE/TIME: 12/27/2021 5:17 PM    INDICATION: Erythema, swelling noted to great toe, history of diabetes, neuropathy.  COMPARISON: None.      Impression    IMPRESSION: Soft tissue swelling of the great toe. No evidence of acute fracture. Mild to moderate first MTP degenerative changes. Mild irregularity of the medial aspect of the head of the first metatarsal could be chronic erosive change. Recommend   clinical correlation for history of gout.    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    No acute lytic or destructive lesions. Moderate midfoot degenerative changes.   Symptomatic; Yes; 12/24/2021 Influenza A/B & SARS-CoV2 (COVID-19) Virus PCR Multiplex Nasopharyngeal     Status: Normal    Specimen: Nasopharyngeal; Swab   Result Value Ref Range    Influenza A PCR Negative Negative    Influenza B PCR Negative Negative    SARS CoV2 PCR Negative Negative    Narrative    Testing was performed using the rianna SARS-CoV-2 & Influenza A/B Assay on the rianna Olive System. This test should be ordered for the detection of SARS-CoV-2 and influenza viruses in individuals who meet clinical and/or epidemiological criteria. Test performance is unknown in asymptomatic patients. This test is for in vitro diagnostic use under the FDA EUA for laboratories certified under CLIA to perform moderate and/or high complexity testing. This test has not been FDA cleared or approved. A negative result does not rule out the presence of PCR inhibitors in the specimen or target RNA in concentration below the limit of detection for the assay. If only one viral target is positive but coinfection with multiple targets is  suspected, the sample should be re-tested with another FDA cleared, approved or authorized test, if coinfection would change clinical management. [facility] are certified under the Clinical Laboratory Improvement Amendments of 1988 (CLIA-88) as  qualified to perform moderate and/or high complexity laboratory testing.   Troponin I     Status: Normal   Result Value Ref Range    Troponin I High Sensitivity 19 <79 ng/L   Comprehensive metabolic panel     Status: Abnormal   Result Value Ref Range    Sodium 135 133 - 144 mmol/L    Potassium 3.2 (L) 3.4 - 5.3 mmol/L    Chloride 100 94 - 109 mmol/L    Carbon Dioxide (CO2) 29 20 - 32 mmol/L    Anion Gap 6 3 - 14 mmol/L    Urea Nitrogen 21 7 - 30 mg/dL    Creatinine 1.22 0.66 - 1.25 mg/dL    Calcium 8.3 (L) 8.5 - 10.1 mg/dL    Glucose 212 (H) 70 - 99 mg/dL    Alkaline Phosphatase 118 40 - 150 U/L    AST 19 0 - 45 U/L    ALT 38 0 - 70 U/L    Protein Total 7.3 6.8 - 8.8 g/dL    Albumin 3.4 3.4 - 5.0 g/dL    Bilirubin Total 1.7 (H) 0.2 - 1.3 mg/dL    GFR Estimate 63 >60 mL/min/1.73m2   Nt probnp inpatient (BNP)     Status: Abnormal   Result Value Ref Range    N terminal Pro BNP Inpatient 1,797 (H) 0 - 900 pg/mL   CBC with platelets and differential     Status: Abnormal   Result Value Ref Range    WBC Count 8.4 4.0 - 11.0 10e3/uL    RBC Count 3.67 (L) 4.40 - 5.90 10e6/uL    Hemoglobin 10.9 (L) 13.3 - 17.7 g/dL    Hematocrit 33.1 (L) 40.0 - 53.0 %    MCV 90 78 - 100 fL    MCH 29.7 26.5 - 33.0 pg    MCHC 32.9 31.5 - 36.5 g/dL    RDW 13.8 10.0 - 15.0 %    Platelet Count 212 150 - 450 10e3/uL    % Neutrophils 82 %    % Lymphocytes 9 %    % Monocytes 8 %    % Eosinophils 1 %    % Basophils 0 %    % Immature Granulocytes 0 %    NRBCs per 100 WBC 0 <1 /100    Absolute Neutrophils 6.9 1.6 - 8.3 10e3/uL    Absolute Lymphocytes 0.7 (L) 0.8 - 5.3 10e3/uL    Absolute Monocytes 0.7 0.0 - 1.3 10e3/uL    Absolute Eosinophils 0.1 0.0 - 0.7 10e3/uL    Absolute Basophils 0.0  0.0 - 0.2 10e3/uL    Absolute Immature Granulocytes 0.0 <=0.4 10e3/uL    Absolute NRBCs 0.0 10e3/uL   Extra Blue Top Tube     Status: None   Result Value Ref Range    Hold Specimen JIC    Extra Green Top (Lithium Heparin) Tube     Status: None   Result Value Ref Range    Hold Specimen JIC    Troponin I     Status: Normal   Result Value Ref Range    Troponin I High Sensitivity 21 <79 ng/L   Uric acid     Status: Abnormal   Result Value Ref Range    Uric Acid 9.1 (H) 3.5 - 7.2 mg/dL   Potassium     Status: Normal   Result Value Ref Range    Potassium 3.4 3.4 - 5.3 mmol/L   Glucose by meter     Status: Abnormal   Result Value Ref Range    GLUCOSE BY METER POCT 204 (H) 70 - 99 mg/dL   Partial thromboplastin time     Status: Abnormal   Result Value Ref Range    aPTT 58 (H) 22 - 38 Seconds   Glucose by meter     Status: Abnormal   Result Value Ref Range    GLUCOSE BY METER POCT 201 (H) 70 - 99 mg/dL   Basic metabolic panel     Status: Abnormal   Result Value Ref Range    Sodium 136 133 - 144 mmol/L    Potassium 3.1 (L) 3.4 - 5.3 mmol/L    Chloride 101 94 - 109 mmol/L    Carbon Dioxide (CO2) 29 20 - 32 mmol/L    Anion Gap 6 3 - 14 mmol/L    Urea Nitrogen 19 7 - 30 mg/dL    Creatinine 1.12 0.66 - 1.25 mg/dL    Calcium 8.7 8.5 - 10.1 mg/dL    Glucose 186 (H) 70 - 99 mg/dL    GFR Estimate 70 >60 mL/min/1.73m2   CBC with platelets     Status: Abnormal   Result Value Ref Range    WBC Count 5.9 4.0 - 11.0 10e3/uL    RBC Count 3.38 (L) 4.40 - 5.90 10e6/uL    Hemoglobin 9.9 (L) 13.3 - 17.7 g/dL    Hematocrit 30.7 (L) 40.0 - 53.0 %    MCV 91 78 - 100 fL    MCH 29.3 26.5 - 33.0 pg    MCHC 32.2 31.5 - 36.5 g/dL    RDW 13.7 10.0 - 15.0 %    Platelet Count 175 150 - 450 10e3/uL   Magnesium     Status: Normal   Result Value Ref Range    Magnesium 1.9 1.6 - 2.3 mg/dL   Glucose by meter     Status: Abnormal   Result Value Ref Range    GLUCOSE BY METER POCT 167 (H) 70 - 99 mg/dL   Activated clotting time celite, POCT     Status: Normal    Result Value Ref Range    Activated Clotting Time (Celite) POCT 139 74 - 150 seconds   Glucose by meter     Status: Abnormal   Result Value Ref Range    GLUCOSE BY METER POCT 160 (H) 70 - 99 mg/dL   EKG 12-lead, tracing only     Status: None   Result Value Ref Range    Systolic Blood Pressure  mmHg    Diastolic Blood Pressure  mmHg    Ventricular Rate 93 BPM    Atrial Rate 110 BPM    NY Interval  ms    QRS Duration 142 ms     ms    QTc 507 ms    P Axis  degrees    R AXIS -58 degrees    T Axis -37 degrees    Interpretation ECG       Atrial fibrillation  Left axis deviation  Right bundle branch block  Anteroseptal infarct (cited on or before 29-JUN-2005)  Abnormal ECG  When compared with ECG of 08-APR-2021 02:45,  Questionable change in initial forces of Septal leads  T wave inversion less evident in Anterior leads  Confirmed by - EMERGENCY ROOM, PHYSICIAN ([id number]),  MARJORIE JACKSON ([id number]) on 12/28/2021 8:47:37 AM     EKG 12-lead, tracing only     Status: None (Preliminary result)   Result Value Ref Range    Systolic Blood Pressure  mmHg    Diastolic Blood Pressure  mmHg    Ventricular Rate 71 BPM    Atrial Rate 214 BPM    NY Interval  ms    QRS Duration 128 ms     ms    QTc 465 ms    P Axis  degrees    R AXIS -58 degrees    T Axis -61 degrees    Interpretation ECG       Atrial fibrillation  Left axis deviation  Right bundle branch block  Inferior infarct , age undetermined  Anterior infarct , age undetermined  Abnormal ECG     Cardiology IP Consult: Patient to be seen: Routine - within 24 hours; chest pain, known CAD; Consultant may enter orders: Yes; Requesting provider? Hospitalist (if different from attending physician)     Status: None ()    Livan Sharif MD     12/28/2021  1:35 PM  [facility]    CARDIOLOGY CONSULT    Date of Admission:  12/27/2021  Date of Consult: December 28, 2021    ASSESSMENT:  71-year-old male with extensive CAD and  previous stenting is seen   for 2 weeks of exertional unstable angina.  His symptoms are   quite convincing for angina, the chest pain comes on with light   walking, it is relieved with rest.  EKG shows no acute changes,   troponins have been negative.    We talked about further evaluation, coronary angiogram was   recommended.  A stress test would probably not be sensitive   enough to detect any new stenosis with his extensive history.    The risk and benefit of the procedure was explained in detail, he   understands and wishes to proceed.    He does have a little redness to his left great toe, but this   does not appear to be a severe cellulitis.  He has no fever and   no elevated white count, it should be OK for angiogram.    His last dose of Eliquis was the morning of December 27.    RECOMMENDATIONS:  1.  CAD with 2 weeks of exertional chest pain, suspicious for   angina  -Coronary angiogram today  -Continue clopidogrel, also start aspirin  -Echocardiogram pending  -Continue heparin until Cath Lab  -Continue other cardiac medications    2.  A. Fib  -Rates controlled  - Holding Eliquis for angiogram    Livan King MD  Cardiology [facility]  Pager:  [phone]  Text Page  December 28, 2021    Addendum:  Angiogram today shows moderate disease, no significant change   from April, no intervention needed. Unclear what is causing his   chest pain. EDP was 19, mild fluid retention potentially could   have caused a little angina. Symptoms may not be cardiac either.   Imdur will be increased, otherwise he is on good medical therapy.    Plan to observe overnight, likely home tomorrow if symptom-free.    Livan King MD  Cardiology [facility]  Pager: [phone]  Text Page  December 28, 2021    CODE STATUS:  Full Code    REASON FOR CONSULT: CAD, chest pain    PRIMARY CARE PHYSICIAN:  Physician No Ref-Primary    HISTORY OF PRESENT ILLNESS:  71 year old male with known coronary disease is seen for chest    pain.  He has A. fib, diabetes type 2, hypertension, CVA, sleep   apnea, CKD.    2005 he had an MI with EF of 35 to 40%, history of stenting of   the LAD and OM.  July 2019 he underwent 3 stents to the RCA.  10   stents total.    Echo April 2021 showed EF 40 to 45% with mild global hypokinesis   and septal akinesis, no significant valve disease.  Angiogram   then showed three-vessel disease with mild diffuse 50% in-stent   restenosis and focal 75% in-stent restenosis of the mid LAD with   FFR 0.87, 30% proximal RCA, 40% mid in-stent restenosis of the   RCA, 30% distal RCA in-stent restenosis, 25% OM3 in-stent   restenosis, no intervention performed.    He had been doing fairly well from a cardiac perspective in   recent months.  His walking is somewhat limited by previous toe   amputation.  He will go short distances, but does nothing   strenuous.  He denies any lightheadedness, dizziness, or syncope.    In the past 2 weeks he has had exertional chest pain in the upper   chest and neck.  This comes on walking to his mailbox and doing   light activity around his home.  Symptoms resolved with rest.  He   has not tried nitro.  He has had a few episodes of resting pain,   but mostly exertional.    He presented to the ED, he was hemodynamically stable.  EKG   showed no changes and troponins have been negative.  He had a   little discomfort overnight, but none this morning.  Had some   redness in his left toe, antibiotics were started for possible   cellulitis.    PAST MEDICAL HISTORY:  I have reviewed this patient's medical history and updated it   with pertinent information if needed.   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     Cardiomyopathy (H)      Cellulitis of left lower extremity 3/13/2018     Cerebral infarction (H)     eye  sight decreased in peripheral of right side and blurry     Cerebral infarction (H) 2016     Diabetic ulcer of left midfoot associated with type 2 diabetes   mellitus, with fat layer exposed (H) 3/13/2018     Essential hypertension 11/13/2002     Essential hypertension, benign 11/13/2002     Generalized osteoarthrosis, unspecified site 11/13/2002     Lightheadedness 12/27/2021     Microalbuminuria 3/13/2018    X1     Myocardial infarction (H)      Myocardial infarction (H) 06/28/2005     Neuropathy      Neuropathy 2016     Sepsis (H)      Sleep apnea     doesn't use it     Sleep apnea 2006     Substance abuse (H)      Substance abuse (H)      Syncope, unspecified syncope type 3/13/2018     Syncope, unspecified syncope type 03/13/2018     Thyroid disease     takes medicine     Thyroid disease 2005     Tobacco use disorder 11/13/2002     Type 2 diabetes mellitus (H) 2000     Type 2 diabetes mellitus with diabetic peripheral angiopathy   without gangrene, unspecified long term insulin use status 2005     PAST SURGICAL HISTORY:  I have reviewed this patient's surgical history and updated it   with pertinent information if needed.  Past Surgical History:   Procedure Laterality Date     AMPUTATE TOE(S) Right 1/6/2019    Procedure: Right open second toe partial amputation;  Surgeon:   Sabino Garcia DPM;  Location: [facility] OR     AMPUTATE TOE(S) Right 1/8/2019    Procedure: 1.  Revision right second toe amputation with   resection of distal half of proximal phalanx with full closure.      2.  Debridement of callus/preulcerative lesion, distal left   second toe and plantar left first metatarsophalangeal joint.;    Surgeon: Ryan Bhagat DPM;  Location: [facility] OR     AMPUTATE TOE(S) Right 3/12/2019    Procedure: Partial right fourth toe amputation.;  Surgeon:   Ryan Bhagat DPM;  Location: [facility] OR     AMPUTATE TOE(S) Right 5/6/2019    Procedure: Right partial third toe amputation;  Surgeon: Татьяна Mckeon DPM,  Podiatry/Foot and Ankle Surgery;  Location: [facility] OR     AMPUTATE TOE(S) Left 4/18/2020    Procedure: LEFT SECOND TOE AMPUTATION;  Surgeon: Ryan Bhagat DPM;  Location: [facility] OR     AMPUTATE TOE(S) Left 5/8/2021    Procedure: 1.  Amputation of the left third toe at the level of   the proximal interphalangeal joint. 2.  Excisional debridement of   less than 20 square cm down to the level of the deeper skin,   plantar left foot.;  Surgeon: Kwasi Root DPM;  Location:   [facility] OR     AMPUTATE TOE(S) Right 2019    4 toes amputation      ANGIOGRAM  6/29/05    subtotal occ.mid LAD,SUSAN mid LAD     ANGIOGRAM  7/05    mild CAD,patent stent,no flow-limiting lesions,sev.LV dysf.LAD   enlarged     ANGIOGRAM  2/09    Sev.single vessel disease,Mod LV dysf.distal LAD 90%,70-75% mid   lad just before prev stent,SUSAN to prox.mid LAD, endeavor to   distal LAD     ANGIOGRAM  11/13/13    restenosis, stent LAD     BACK SURGERY      back surgery x3     CARDIAC CATHETERIZATION  07/08/2019     CARDIAC CATHETERIZATION Left 06/13/2017     CARDIAC CATHETERIZATION  2005,2009,20013     CORONARY STENT PLACEMENT  07/08/2019     CV CORONARY ANGIOGRAM N/A 7/8/2019    Procedure: Coronary Angiogram;  Surgeon: Jose Alfredo Crockett MD;    Location:  HEART CARDIAC CATH LAB     CV CORONARY ANGIOGRAM N/A 4/9/2021    Procedure: CV CORONARY ANGIOGRAM;  Surgeon: Warren Paulson MD;  Location: [facility] CARDIAC CATH LAB     CV LEFT HEART CATH N/A 7/8/2019    Procedure: Left Heart Cath;  Surgeon: Jose Alfredo Crockett MD;    Location:  HEART CARDIAC CATH LAB     CV PCI ASPIRATION THROMECTOMY N/A 7/8/2019    Procedure: PCI Aspiration Thrombectomy;  Surgeon: Jose Alfredo Crockett MD;  Location:  HEART CARDIAC CATH LAB     CV PCI STENT DRUG ELUTING N/A 7/8/2019    Procedure: PCI Stent Drug Eluting;  Surgeon: Jose Alfredo Crockett MD;  Location:  HEART CARDIAC CATH LAB     HEART CATH LEFT HEART CATH  06/13/2017    SUSAN to OM3     INCISION AND DRAINAGE  TOE, COMBINED Bilateral 2018    Procedure: COMBINED INCISION AND DRAINAGE TOE;  1) Irrigation   and debridement ulcer left great toe  2) Amputation 3rd toe right foot at distal interphalangeal joint   level;  Surgeon: Miki Harp MD;  Location: [facility] OR     IRRIGATION AND DEBRIDEMENT FOOT, COMBINED Left 3/12/2019    Procedure: Debridement of left foot ulcer sub first MPJ 2 x 2.5   cm down to and including subcutaneous tissue, callus debridement   for preulcerative lesion, left second toe.;  Surgeon:   Ryan Bhagat DPM;  Location:  OR     ORTHOPEDIC SURGERY      bunion surgery both feet     ORTHOPEDIC SURGERY      left shoulder     OTHER SURGICAL HISTORY  9 years old    cancer tumor mouth     SHOULDER ARTHROSCOPY W/ ROTATOR CUFF REPAIR Left      SOFT TISSUE SURGERY      debridement of toe numerous time     VASCULAR SURGERY      7 stents in heart     VASCULAR SURGERY       [facility] NONSPECIFIC PROCEDURE      Laminectomy x 3 - (1983 x 2 & )     [facility] NONSPECIFIC PROCEDURE Bilateral 1998    Bunionectomy     [facility] NONSPECIFIC PROCEDURE  1959    Gingival surgery at age 9       HOME MEDICATIONS:  Prior to Admission Medications   Prescriptions Last Dose Informant Patient Reported? Taking?   Continuous Blood Gluc  (FREESTYLE CLARITA 2 READER SYSTM)   DRAGAN   No No   Si Device daily   Continuous Blood Gluc Sensor (FREESTYLE CLARITA 2 SENSOR SYSTM)   MISC   No No   Si Units 4 times daily (before meals and nightly)   ammonium lactate (AMLACTIN) 12 % external cream Past Week at   Unknown time  No Yes   Sig: Apply topically 2 times daily   apixaban ANTICOAGULANT (ELIQUIS) 5 MG tablet 2021 at am  No   Yes   Sig: Take 1 tablet (5 mg) by mouth 2 times daily   atorvastatin (LIPITOR) 40 MG tablet 2021 at am  No Yes   Sig: Take 1 tablet (40 mg) by mouth every evening   carvedilol (COREG) 25 MG tablet 2021 at am  No Yes   Sig: Take 1 tablet (25 mg) by mouth 2 times daily (with meals)    clopidogrel (PLAVIX) 75 MG tablet 12/27/2021 at an  No Yes   Sig: Take 1 tablet (75 mg) by mouth daily   furosemide (LASIX) 40 MG tablet 12/27/2021 at am  No Yes   Sig: Take 1 tablet (40 mg) in the morning and 1/2 tablet (20 mg)   in the afternoon   gabapentin (NEURONTIN) 300 MG capsule 12/26/2021 at 2caps hs  No   Yes   Sig: TAKE 2-3 CAPSULES (600-900 MG) BY MOUTH AT BEDTIME   insulin aspart (NOVOLOG FLEXPEN) 100 UNIT/ML pen 12/26/2021 at   Unknown time  Yes Yes   Sig: Inject Subcutaneous 3 times daily (with meals) Sliding Scale  Do not give sliding scale insulin if Pre-Meal BG less than 140.   For Pre-Meal:   - 200 give 2 units.    - 250 give 4 units.    - 300 give 6 units.    - 350 give 8 units.    - 400 give 10 units.   insulin aspart (NOVOLOG PEN) 100 UNIT/ML pen 12/27/2021 at am  No   Yes   Sig: Inject 12 Units Subcutaneous 2 times daily (with meals) With   breakfast and lunch   insulin aspart (NOVOLOG PEN) 100 UNIT/ML pen 12/26/2021 at pm  No   Yes   Sig: Inject 14 Units Subcutaneous daily (with dinner)   insulin glargine (LANTUS PEN) 100 UNIT/ML pen 12/27/2021 at am    Yes Yes   Sig: Inject 44 units subcutaneously once every morning   insulin pen needle (31G X 6 MM) 31G X 6 MM miscellaneous   No No   Sig: Use  as directed.   isosorbide mononitrate (IMDUR) 30 MG 24 hr tablet 12/27/2021 at   am  No Yes   Sig: Take 1 tablet (30 mg) by mouth daily   levothyroxine (SYNTHROID, LEVOTHROID) 137 MCG tablet 12/26/2021   at pm Self Yes Yes   Sig: Take 1 tablet by mouth once every evening   lisinopril (ZESTRIL) 2.5 MG tablet 12/27/2021 at am  No Yes   Sig: Take 1 tablet (2.5 mg) by mouth daily   nitroGLYcerin (NITROSTAT) 0.4 MG sublingual tablet   No No   Sig: For chest pain place 1 tablet under the tongue every 5   minutes for 3 doses. If symptoms persist 5 minutes after 1st dose   call 911.   pantoprazole (PROTONIX) 40 MG EC tablet 12/27/2021 at am  No Yes   Sig: TAKE 1 TABLET BY MOUTH  EVERY MORNING BEFORE BREAKFAST   triamcinolone (KENALOG) 0.1 % external cream Past Week at Unknown   time  No Yes   Sig: Apply topically 2 times daily      Facility-Administered Medications: None       ALLERGIES:  Allergies   Allergen Reactions     No Known Allergies        SOCIAL HISTORY:  I have reviewed this patient's social history and updated it with   pertinent information if needed. Jayro Elder  reports that   he quit smoking about 16 years ago. His smoking use included   cigarettes. He started smoking about 42 years ago. He has a 25.00   pack-year smoking history. He has never used smokeless tobacco.   He reports current alcohol use of about 4.0 standard drinks of   alcohol per week. He reports that he does not use drugs.    FAMILY HISTORY:  I have reviewed this patient's family history and updated it with   pertinent information if needed.   Family History   Problem Relation Age of Onset     Cancer - colorectal Sister      Thyroid Disease Brother      Cardiovascular Brother      Diabetes Brother      Cancer Father         tumor in chest and throat     Arthritis Mother      Thyroid Disease Mother      Diabetes Mother      Glaucoma No family hx of      Macular Degeneration No family hx of        REVIEW OF SYSTEMS:  Constitutional:  No weight loss, fever, chills, weakness or   fatigue.  HEENT:  Eyes:  No visual loss, blurred vision, double vision or   yellow sclerae. No hearing loss, sneezing, congestion, runny nose   or sore throat.  Skin:  No rash or itching.  Cardiovascular: per HPI  Respiratory: per HPI  GI:  No anorexia, nausea, vomiting or diarrhea. No abdominal pain   or blood.  :  No dysurea, hematuria  Neurologic:  No headache, dizziness, syncope, paralysis, ataxia,   numbness or tingling in the extremities. No change in bowel or   bladder control.  Musculoskeletal:  No muscle, back pain, joint pain or stiffness.  Hematologic:  No anemia, bleeding or bruising.  Lymphatics:  No enlarged nodes.  No history of splenectomy.  Psychiatric:  No history of depression or anxiety.  Endocrine:  No reports of sweating, cold or heat intolerance. No   polyuria or polydipsia.  Allergies:  No history of asthma, hives, eczema or rhinitis.    PHYSICAL EXAM:  Temp: 98.2  F (36.8  C) Temp src: Oral BP: (!) 159/83 Pulse: 73     Resp: 18 SpO2: 98 % O2 Device: None (Room air)    Vital Signs with Ranges  Temp:  [97.4  F (36.3  C)-98.2  F (36.8  C)] 98.2  F (36.8  C)  Pulse:  [69-86] 73  Resp:  [18] 18  BP: (110-159)/(68-83) 159/83  SpO2:  [96 %-99 %] 98 %  117 lbs 0 oz    Constitutional: awake, alert, no distress  Eyes: PERRL, sclera nonicteric  ENT: trachea midline  Respiratory: Lungs clear  Cardiovascular: Regular rate, totally irregular rhythm, no   murmurs  GI: nondistended, nontender, bowel sounds present  Lymph/Hematologic: no lymphadenopathy  Skin: Left great toe has callus on the lateral side, very minimal   erythema  Musculoskeletal: good muscle tone, strength 5/5 in upper and   lower extremities  Neurologic: no focal deficits  Neuropsychiatric: appropriate affact    DATA:  Labs: Potassium 3.4, creatinine 1.2, LFTs normal, NT proBNP 1800,   troponin 19, 21, hemoglobin 11, platelets 212, Covid and   influenza negative  Recent Labs   Lab Test 04/08/21  0426 08/13/20  1028 06/30/16  0849 02/02/15  1016 11/14/13  0710   CHOL 79 107   < > 99 103   HDL 36* 45   < > 43 30*   LDL 33 53   < > 42 57   TRIG 52 44   < > 68 82   CHOLHDLRATIO  --   --   --  2.3 3.5    < > = values in this interval not displayed.     EKG: December 27: A. fib, rate 92, right bundle branch block,   anteroseptal Q wave, left axis deviation.  No significant changes   from April 8, 2021.    Tele: A. fib, rate 80    Echo: Pending           Echocardiogram Complete     Status: None   Result Value Ref Range    Biplane LVEF 41%     Narrative    [id number]  [id number]  [id number]  [id number]^MARTIN^KAI^[facility]  Echocardiography  Laboratory  [address] [place] [address]     Name: PORTER YANCEY  MRN: [id number]  : 1950  Study Date: 2021 07:38 AM  Age: 71 yrs  Gender: Male  Patient Location: [facility]  Reason For Study: Chest Pain  Ordering Physician: KAI SALAZAR  Performed By: Olga Allison     BSA: 2.3 m2  Height: 76 in  Weight: 217 lb  HR: 74  BP: 159/83 mmHg  ______________________________________________________________________________  Procedure  Complete Portable Echo Adult. Optison (NDC #4061-3192) given intravenously.  ______________________________________________________________________________  Interpretation Summary     The left ventricle is normal in size.  There is mild concentric left ventricular hypertrophy.  Biplane LVEF is 41%.  The apex and mid to distal septum are severely hypokinetic.  There is moderate global hypokinesia of the left ventricle.  Diastolic Doppler findings (E/E' ratio and/or other parameters) suggest left  ventricular filling pressures are increased.  The right ventricle is normal size.  Moderately decreased right ventricular systolic function  There is mild (1+) tricuspid regurgitation.  Severe (>55mmHg) pulmonary hypertension is present.  Compared to the last echo of 2021 the EF is similar but estimated right  heart pressures are higher.     The study was technically difficult. Contrast was used without apparent  complications.  ______________________________________________________________________________  Left Ventricle  The left ventricle is normal in size. There is mild concentric left  ventricular hypertrophy. Diastolic Doppler findings (E/E' ratio and/or other  parameters) suggest left ventricular filling pressures are increased. Biplane  LVEF is 41%. There is moderate global hypokinesia of the left ventricle. The  apex and mid to distal septum are severely hypokinetic.     Right Ventricle  The right ventricle is normal size. Moderately decreased  right ventricular  systolic function.     Atria  Normal left atrial size. Right atrial size is normal. There is no atrial shunt  seen.     Mitral Valve  The mitral valve leaflets appear normal. There is no evidence of stenosis,  fluttering, or prolapse. There is trace mitral regurgitation.     Tricuspid Valve  Normal tricuspid valve. There is mild (1+) tricuspid regurgitation. The right  ventricular systolic pressure is approximated at 44mmHg plus the right atrial  pressure. Severe (>55mmHg) pulmonary hypertension is present. IVC diameter  >2.1 cm collapsing <50% with sniff suggests a high RA pressure estimated at 15  mmHg or greater.     Aortic Valve  There is mild trileaflet aortic sclerosis. There is trace aortic  regurgitation.     Pulmonic Valve  There is mild (1+) pulmonic valvular regurgitation.     Vessels  Normal size aorta.     Pericardium  There is no pericardial effusion.     Rhythm  The rhythm was atrial fibrillation.  ______________________________________________________________________________  MMode/2D Measurements & Calculations     IVSd: 1.5 cm  LVIDd: 4.7 cm  LVIDs: 3.8 cm  LVPWd: 1.3 cm  FS: 19.4 %  LV mass(C)d: 271.5 grams  LV mass(C)dI: 118.4 grams/m2  Ao root diam: 3.2 cm  LA dimension: 5.4 cm  asc Aorta Diam: 3.7 cm  LA/Ao: 1.7  LVOT diam: 2.3 cm  LVOT area: 4.2 cm2  LA Volume (BP): 70.6 ml  LA Volume Index (BP): 30.8 ml/m2  RWT: 0.54     Doppler Measurements & Calculations  MV E max john: 100.7 cm/sec  MV dec time: 0.15 sec  AI P1/2t: 562.4 msec  LV V1 max PG: 3.5 mmHg  LV V1 max: 94.0 cm/sec  LV V1 VTI: 20.0 cm  SV(LVOT): 83.1 ml  SI(LVOT): 36.2 ml/m2  PA acc time: 0.10 sec  PI end-d john: 191.0 cm/sec  TR max john: 304.2 cm/sec  TR max P.7 mmHg  E/E' av.6  Lateral E/e': 22.2  Medial E/e': 23.1     ______________________________________________________________________________  Report approved by: Hank Salmeron 2021 10:05 AM         Cardiac Catheterization     Status:  None   Result Value Ref Range    Cath EF Estimated 35 %    Narrative      The ejection fraction is 30-40% by visual estimate.    Left ventricular filling pressures are mildly elevated.    Prox LAD lesion is 20% stenosed.    Mid LAD lesion is 65% stenosed.    Prox Cx lesion is 10% stenosed.    Mid Cx lesion is 20% stenosed.    Prox RCA lesion is 50% stenosed.    Mid RCA lesion is 35% stenosed.    Dist RCA lesion is 10% stenosed.    RV Branch lesion is 100% stenosed.     1. Mild/mod ISR of Lad system stents with preserved iFR 0.91, no change   from cath 4/2021-REC ongoing medical therapy  2. Stents to OM and RCA only mild narrowing with normal iFR to RCA  3. Mild elevated LVEDP with reduced EF 35-40% with prior MI  4. Note: pt on plavix and eliquis with progressive anemia. Also note pt   was on eliquis yesterday so should be watch for hematoma at cath site   today and consider anemia workup as outpt for this pt on chronic   anticoagulants and slowly progressive anemia. Consider addn of low dose   diuretic with elevated LVEDP     CBC with platelets differential     Status: Abnormal    Narrative    The following orders were created for panel order CBC with platelets differential.  Procedure                               Abnormality         Status                     ---------                               -----------         ------                     CBC with platelets and d...[[id number]]  Abnormal            Final result                 Please view results for these tests on the individual orders.   Extra Tube ([facility] Draw)     Status: None    Narrative    The following orders were created for panel order Extra Tube ([facility] Draw).  Procedure                               Abnormality         Status                     ---------                               -----------         ------                     Extra Blue Top Tube[[id number]]                              Final result               Extra Green Top  (Lithium...[[id number]]                      Final result                 Please view results for these tests on the individual orders.

## 2021-12-28 NOTE — PLAN OF CARE
PRIMARY DIAGNOSIS: CHEST PAIN  OUTPATIENT/OBSERVATION GOALS TO BE MET BEFORE DISCHARGE:  1. Ruled out acute coronary syndrome (negative or stable Troponin):  Yes  2. Pain Status: Pain free.  3. Appropriate provocative testing performed: Yes  - Stress Test Procedure: Cardiology Consult today.  - Interpretation of cardiac rhythm per telemetry tech: Afib 83    4. Cleared by Consultants (if applicable):No  5. Return to near baseline physical activity: No  Vitals are Temp: 98.2  F (36.8  C) Temp src: Oral BP: (!) 159/83 Pulse: 73   Resp: 18 SpO2: 98 %.    Patient is Alert and Oriented x4. He denies any chest pain with rest but reports chest discomfort with activity. Patient is Saline locked. Pt is a Cardiac diet. He is 1 Assist with no assistive devices  and Gait Belt. Heparin is infusing with RN to manage orders. Plan is for Cardiology consult today.     Discharge Planner Nurse   Safe discharge environment identified: Yes  Barriers to discharge: Yes       Entered by: Skye Soto 12/28/2021 4:53 AM     Please review provider order for any additional goals.   Nurse to notify provider when observation goals have been met and patient is ready for discharge.

## 2021-12-28 NOTE — PROGRESS NOTES
ROOM # [id number]    Living Situation (if not independent, order SW consult): home   Facility name: n/a  : [facility]     Activity level at baseline: cane or walker at times when weak   Activity level on admit: SBA       Patient registered to observation; given Patient Bill of Rights; given the opportunity to ask questions about observation status and their plan of care.  Patient has been oriented to the observation room, bathroom and call light is in place.    Discussed discharge goals and expectations with patient/family.

## 2021-12-28 NOTE — H&P
[facility]    History and Physical - Hospitalist Service       Date of Admission:  12/27/2021    Assessment & Plan      Jayro Elder is a 71 year old male admitted on 12/27/2021. He has a past medical history significant for diabetes mellitus type 2, atrial fibrillation, coronary artery disease, hypertension, hyperlipidemia, congestive heart failure, previous stroke, peripheral neuropathy, and sleep apnea.  He presented to emergency room with chest pain.  Symptoms concerning for unstable angina.    1.  Chest pain.  Symptoms concerning for unstable angina.  Has known coronary artery disease.  Initial troponin not elevated.  Cardiology contacted by emergency room provider.  Asking for apixaban to be held tonight and tomorrow morning.  -Start heparin drip while apixaban held.  -Monitor on telemetry.  -Serial troponins.  -Formal cardiology consult.  -Restart atorvastatin 40 mg a day..  -Restart carvedilol 25 mg twice a day.  -Restart clopidogrel 75 mg a day.  -Restart isosorbide mononitrate 30 mg a day.    2.  Diabetes mellitus type 2.  Normally on glargine insulin in the morning and aspart insulin with meals.  -Hold tomorrow's dose of glargine until plan made by cardiology.    -If he is going to be n.p.o., consider reduced dose of glargine.  -Start aspart sliding scale.    3.  Left first toe erythema.  Appears to be possible gout versus cellulitis..  He is not having much pain.  He states he does not have a history of gout.  Started on IV ceftriaxone in the emergency room.  -Check uric acid level.  -Continue antibiotics with IV cefazolin for now.  -Pain medications as needed.    4.  Peripheral neuropathy.  -Restart gabapentin.    5.  Hypothyroidism.  -Restart levothyroxine.    6.  Hypertension.  -Restart lisinopril, isosorbide mononitrate, and carvedilol.    7.  GERD.  -Pantoprazole 40 mg a day.    8.  Hypokalemia.  -Potassium placement protocol.    9.  Chronic kidney disease stage II.   Creatinine likely at baseline at 1.2.  -Avoid nephrotoxins as able.    10.  Atrial fibrillation.  Heart rate controlled.  -Hold apixaban as noted above per cardiology request.  -Start heparin drip for anticoagulation due to concern for unstable angina.  -Continue carvedilol 25 mg twice a day.  -Monitor on telemetry.         Diet: Combination Diet Low Saturated Fat Na <2400mg Diet, No Caffeine Diet    DVT Prophylaxis: Heparin SQ  Taylor Catheter: Not present  Central Lines: None  Code Status: Full Code      Clinically Significant Risk Factors Present on Admission          # Hypocalcemia: Ca = 8.3 mg/dL (Ref range: 8.5 - 10.1 mg/dL) and/or iCa = N/A on admission, will replace as needed    # Coagulation Defect: home medication list includes an anticoagulant medication  # Platelet Defect: home medication list includes an antiplatelet medication           [facility]  Securely message with the Vocera Web Console (learn more here)  Text page via [employer] Paging/Directory        ______________________________________________________________________    Chief Complaint   Chest pain.    History is obtained from the patient    History of Present Illness   Jayro Elder is a 71 year old male who has a past medical history significant for diabetes mellitus type 2, coronary artery disease, hypertension, hyperlipidemia, congestive heart failure, previous stroke, peripheral neuropathy, and sleep apnea.  He has been having chest pain for the past 3 to 4 days.  Pain is located in the left side of his chest.  Radiates to his neck.  Has also been feeling weak.  He gets short of breath with the pain.  Pain happens when he is doing any activities.  Pain will get better when he sits and rests.  He has not noticed a cough.  No fevers or chills.  Pain does feel somewhat similar to previous times when he has required cardiac stents.  Pain is better after medications given in the ER.  He has been  on his feet quite a bit over the past week getting ready for the holidays.  He has noted a mild ache in his left foot.  Not much pain in this area.  He does not have a lot of sensation in his lower extremities due to neuropathy.  Has difficulty with his vision.  Has not been able to see his toes well because of his chronic vision problems.  No change in his vision recently.  No other acute complaints.    Review of Systems    The 10 point Review of Systems is negative other than noted in the HPI    Past Medical History    I have reviewed this patient's medical history and updated it with pertinent information if needed.   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     Cardiomyopathy (H)      Cellulitis of left lower extremity 3/13/2018     Cerebral infarction (H)     eye sight decreased in peripheral of right side and blurry     Cerebral infarction (H) 2016     Diabetic ulcer of left midfoot associated with type 2 diabetes mellitus, with fat layer exposed (H) 3/13/2018     Essential hypertension 11/13/2002     Essential hypertension, benign 11/13/2002     Generalized osteoarthrosis, unspecified site 11/13/2002     Lightheadedness 12/27/2021     Microalbuminuria 3/13/2018    X1     Myocardial infarction (H)      Myocardial infarction (H) 06/28/2005     Neuropathy      Neuropathy 2016     Sepsis (H)      Sleep apnea     doesn't use it     Sleep apnea 2006     Substance abuse (H)      Substance abuse (H)      Syncope, unspecified syncope type 3/13/2018     Syncope, unspecified syncope type 03/13/2018     Thyroid disease     takes medicine     Thyroid disease 2005     Tobacco use disorder 11/13/2002     Type 2 diabetes mellitus (H) 2000     Type 2 diabetes mellitus with diabetic peripheral angiopathy without gangrene, unspecified long term  insulin use status 2005       Past Surgical History   I have reviewed this patient's surgical history and updated it with pertinent information if needed.  Past Surgical History:   Procedure Laterality Date     AMPUTATE TOE(S) Right 1/6/2019    Procedure: Right open second toe partial amputation;  Surgeon: Sabino Garcia DPM;  Location: [facility] OR     AMPUTATE TOE(S) Right 1/8/2019    Procedure: 1.  Revision right second toe amputation with resection of distal half of proximal phalanx with full closure.    2.  Debridement of callus/preulcerative lesion, distal left second toe and plantar left first metatarsophalangeal joint.;  Surgeon: Ryan Bhagat DPM;  Location: [facility] OR     AMPUTATE TOE(S) Right 3/12/2019    Procedure: Partial right fourth toe amputation.;  Surgeon: Ryan Bhagat DPM;  Location: [facility] OR     AMPUTATE TOE(S) Right 5/6/2019    Procedure: Right partial third toe amputation;  Surgeon: Татьяна Mckeon DPM, Podiatry/Foot and Ankle Surgery;  Location: [facility] OR     AMPUTATE TOE(S) Left 4/18/2020    Procedure: LEFT SECOND TOE AMPUTATION;  Surgeon: Ryan Bhagat DPM;  Location: [facility] OR     AMPUTATE TOE(S) Left 5/8/2021    Procedure: 1.  Amputation of the left third toe at the level of the proximal interphalangeal joint. 2.  Excisional debridement of less than 20 square cm down to the level of the deeper skin, plantar left foot.;  Surgeon: Kwasi Root DPM;  Location: [facility] OR     AMPUTATE TOE(S) Right 2019    4 toes amputation      ANGIOGRAM  6/29/05    subtotal occ.mid LAD,SUSAN mid LAD     ANGIOGRAM  7/05    mild CAD,patent stent,no flow-limiting lesions,sev.LV dysf.LAD enlarged     ANGIOGRAM  2/09    Sev.single vessel disease,Mod LV dysf.distal LAD 90%,70-75% mid lad just before prev stent,SUSAN to prox.mid LAD, endeavor to distal LAD     ANGIOGRAM  11/13/13    restenosis, stent LAD     BACK SURGERY      back surgery x3     CARDIAC CATHETERIZATION  07/08/2019     CARDIAC CATHETERIZATION Left  06/13/2017     CARDIAC CATHETERIZATION  2005,2009,20013     CORONARY STENT PLACEMENT  07/08/2019     CV CORONARY ANGIOGRAM N/A 7/8/2019    Procedure: Coronary Angiogram;  Surgeon: Jose Alfredo Crockett MD;  Location:  HEART CARDIAC CATH LAB     CV CORONARY ANGIOGRAM N/A 4/9/2021    Procedure: CV CORONARY ANGIOGRAM;  Surgeon: Warren Paulson MD;  Location:  HE[facility] CARD[facility]AC CATH LAB     CV LEFT HEART CATH N/A 7/8/2019    Procedure: Left Heart Cath;  Surgeon: Jose Alfredo Crockett MD;  Location:  HEART CARD[facility] CATH LAB     CV PCI ASPIRATION THROMECTOMY N/A 7/8/2019    Procedure: PCI Aspiration Thrombectomy;  Surgeon: Jose Alfredo Crockett MD;  Location:  HEART CARDIAC CATH LAB     CV PCI STENT DRUG ELUTING N/A 7/8/2019    Procedure: PCI Stent Drug Eluting;  Surgeon: Jose Alfredo Crockett MD;  Location:  HEART CARDIAC CATH LAB     HEART CATH LEFT HEART CATH  06/13/2017    SUSAN to OM3     INCISION AND DRAINAGE TOE, COMBINED Bilateral 9/11/2018    Procedure: COMBINED INCISION AND DRAINAGE TOE;  1) Irrigation and debridement ulcer left great toe  2) Amputation 3rd toe right foot at distal interphalangeal joint level;  Surgeon: Miki Harp MD;  Location:  OR     IRRIGATION AND DEBRIDEMENT FOOT, COMBINED Left 3/12/2019    Procedure: Debridement of left foot ulcer sub first MPJ 2 x 2.5 cm down to and including subcutaneous tissue, callus debridement for preulcerative lesion, left second toe.;  Surgeon: Ryan Bhagat DPM;  Location:  OR     ORTHOPEDIC SURGERY      bunion surgery both feet     ORTHOPEDIC SURGERY      left shoulder     OTHER SURGICAL HISTORY  9 years old    cancer tumor mouth     SHOULDER ARTHROSCOPY W/ ROTATOR CUFF REPAIR Left 2004     SOFT TISSUE SURGERY      debridement of toe numerous time     VASCULAR SURGERY      7 stents in heart     VASCULAR SURGERY       [facility] NONSPECIFIC PROCEDURE      Laminectomy x 3 - (1983 x 2 & 1990)     [facility] NONSPECIFIC PROCEDURE Bilateral 1998    Bunionectomy      ZZC NONSPECIFIC PROCEDURE      Gingival surgery at age 9       Social History   I have reviewed this patient's social history and updated it with pertinent information if needed.  Social History     Tobacco Use     Smoking status: Former Smoker     Packs/day: 1.00     Years: 25.00     Pack years: 25.00     Types: Cigarettes     Start date:      Quit date: 2005     Years since quittin.4     Smokeless tobacco: Never Used   Substance Use Topics     Alcohol use: Yes     Alcohol/week: 4.0 standard drinks     Types: 4 Shots of liquor per week     Comment: OCCASSIONALLY      Drug use: No       Family History   I have reviewed this patient's family history and updated it with pertinent information if needed.  Family History   Problem Relation Age of Onset     Cancer - colorectal Sister      Thyroid Disease Brother      Cardiovascular Brother      Diabetes Brother      Cancer Father         tumor in chest and throat     Arthritis Mother      Thyroid Disease Mother      Diabetes Mother      Glaucoma No family hx of      Macular Degeneration No family hx of        Prior to Admission Medications   Prior to Admission Medications   Prescriptions Last Dose Informant Patient Reported? Taking?   Continuous Blood Gluc  (FREESTYLE CLARITA 2 READER SYSTM) DRAGAN   No No   Si Device daily   Continuous Blood Gluc Sensor (FREESTYLE CLARITA 2 SENSOR SYSTM) MISC   No No   Si Units 4 times daily (before meals and nightly)   ammonium lactate (AMLACTIN) 12 % external cream Past Week at Unknown time  No Yes   Sig: Apply topically 2 times daily   apixaban ANTICOAGULANT (ELIQUIS) 5 MG tablet 2021 at am  No Yes   Sig: Take 1 tablet (5 mg) by mouth 2 times daily   atorvastatin (LIPITOR) 40 MG tablet 2021 at am  No Yes   Sig: Take 1 tablet (40 mg) by mouth every evening   carvedilol (COREG) 25 MG tablet 2021 at am  No Yes   Sig: Take 1 tablet (25 mg) by mouth 2 times daily (with meals)   clopidogrel  (PLAVIX) 75 MG tablet 12/27/2021 at an  No Yes   Sig: Take 1 tablet (75 mg) by mouth daily   furosemide (LASIX) 40 MG tablet 12/27/2021 at am  No Yes   Sig: Take 1 tablet (40 mg) in the morning and 1/2 tablet (20 mg) in the afternoon   gabapentin (NEURONTIN) 300 MG capsule 12/26/2021 at 2caps hs  No Yes   Sig: TAKE 2-3 CAPSULES (600-900 MG) BY MOUTH AT BEDTIME   insulin aspart (NOVOLOG FLEXPEN) 100 UNIT/ML pen 12/26/2021 at Unknown time  Yes Yes   Sig: Inject Subcutaneous 3 times daily (with meals) Sliding Scale  Do not give sliding scale insulin if Pre-Meal BG less than 140.   For Pre-Meal:   - 200 give 2 units.    - 250 give 4 units.    - 300 give 6 units.    - 350 give 8 units.    - 400 give 10 units.   insulin aspart (NOVOLOG PEN) 100 UNIT/ML pen 12/27/2021 at am  No Yes   Sig: Inject 12 Units Subcutaneous 2 times daily (with meals) With breakfast and lunch   insulin aspart (NOVOLOG PEN) 100 UNIT/ML pen 12/26/2021 at pm  No Yes   Sig: Inject 14 Units Subcutaneous daily (with dinner)   insulin glargine (LANTUS PEN) 100 UNIT/ML pen 12/27/2021 at am  Yes Yes   Sig: Inject 44 units subcutaneously once every morning   insulin pen needle (31G X 6 MM) 31G X 6 MM miscellaneous   No No   Sig: Use  as directed.   isosorbide mononitrate (IMDUR) 30 MG 24 hr tablet 12/27/2021 at am  No Yes   Sig: Take 1 tablet (30 mg) by mouth daily   levothyroxine (SYNTHROID, LEVOTHROID) 137 MCG tablet 12/26/2021 at pm Self Yes Yes   Sig: Take 1 tablet by mouth once every evening   lisinopril (ZESTRIL) 2.5 MG tablet 12/27/2021 at am  No Yes   Sig: Take 1 tablet (2.5 mg) by mouth daily   nitroGLYcerin (NITROSTAT) 0.4 MG sublingual tablet   No No   Sig: For chest pain place 1 tablet under the tongue every 5 minutes for 3 doses. If symptoms persist 5 minutes after 1st dose call 911.   pantoprazole (PROTONIX) 40 MG EC tablet 12/27/2021 at am  No Yes   Sig: TAKE 1 TABLET BY MOUTH EVERY MORNING BEFORE BREAKFAST    triamcinolone (KENALOG) 0.1 % external cream Past Week at Unknown time  No Yes   Sig: Apply topically 2 times daily      Facility-Administered Medications: None     Allergies   Allergies   Allergen Reactions     No Known Allergies        Physical Exam   Vital Signs: Temp: 97.4  F (36.3  C)   BP: (!) 149/82 Pulse: 77   Resp: 18 SpO2: 99 % O2 Device: None (Room air)    Weight: 0 lbs 0 oz    Gen:  NAD, A&Ox3.  Eyes:  PERRL, sclera anicteric.  OP:  MMM, no lesions.  Neck:  Supple.  CV:  Mildly irregular, no loud murmurs.  Lung:  CTA b/l, normal effort.  Ab:  +BS, soft.  Skin:  Warm, dry to touch.  Erythema left first toe.  Ext:  No pitting edema LE b/l.      Data   Data reviewed today: I reviewed all medications, new labs and imaging results over the last 24 hours. I personally reviewed the EKG tracing showing Atrial fibrillation, RBBB.    Recent Labs   Lab 12/27/21  1530   WBC 8.4   HGB 10.9*   MCV 90         POTASSIUM 3.2*   CHLORIDE 100   CO2 29   BUN 21   CR 1.22   ANIONGAP 6   BETTIE 8.3*   *   ALBUMIN 3.4   PROTTOTAL 7.3   BILITOTAL 1.7*   ALKPHOS 118   ALT 38   AST 19

## 2021-12-29 VITALS
WEIGHT: 259.8 LBS | DIASTOLIC BLOOD PRESSURE: 74 MMHG | OXYGEN SATURATION: 97 % | SYSTOLIC BLOOD PRESSURE: 119 MMHG | HEART RATE: 71 BPM | TEMPERATURE: 97.7 F | HEIGHT: 76 IN | BODY MASS INDEX: 31.64 KG/M2 | RESPIRATION RATE: 16 BRPM

## 2021-12-29 LAB
ANION GAP SERPL CALCULATED.3IONS-SCNC: 5 MMOL/L (ref 3–14)
APTT PPP: 31 SECONDS (ref 22–38)
ATRIAL RATE - MUSE: 214 BPM
BASOPHILS # BLD AUTO: 0 10E3/UL (ref 0–0.2)
BASOPHILS NFR BLD AUTO: 0 %
BUN SERPL-MCNC: 18 MG/DL (ref 7–30)
CALCIUM SERPL-MCNC: 8.8 MG/DL (ref 8.5–10.1)
CHLORIDE BLD-SCNC: 101 MMOL/L (ref 94–109)
CO2 SERPL-SCNC: 30 MMOL/L (ref 20–32)
CREAT SERPL-MCNC: 1.02 MG/DL (ref 0.66–1.25)
DIASTOLIC BLOOD PRESSURE - MUSE: NORMAL MMHG
EOSINOPHIL # BLD AUTO: 0.2 10E3/UL (ref 0–0.7)
EOSINOPHIL NFR BLD AUTO: 4 %
ERYTHROCYTE [DISTWIDTH] IN BLOOD BY AUTOMATED COUNT: 13.8 % (ref 10–15)
GFR SERPL CREATININE-BSD FRML MDRD: 79 ML/MIN/1.73M2
GLUCOSE BLD-MCNC: 196 MG/DL (ref 70–99)
GLUCOSE BLDC GLUCOMTR-MCNC: 170 MG/DL (ref 70–99)
GLUCOSE BLDC GLUCOMTR-MCNC: 185 MG/DL (ref 70–99)
GLUCOSE BLDC GLUCOMTR-MCNC: 190 MG/DL (ref 70–99)
HCT VFR BLD AUTO: 31 % (ref 40–53)
HGB BLD-MCNC: 9.7 G/DL (ref 13.3–17.7)
IMM GRANULOCYTES # BLD: 0 10E3/UL
IMM GRANULOCYTES NFR BLD: 0 %
INTERPRETATION ECG - MUSE: NORMAL
LYMPHOCYTES # BLD AUTO: 0.8 10E3/UL (ref 0.8–5.3)
LYMPHOCYTES NFR BLD AUTO: 14 %
MCH RBC QN AUTO: 29 PG (ref 26.5–33)
MCHC RBC AUTO-ENTMCNC: 31.3 G/DL (ref 31.5–36.5)
MCV RBC AUTO: 93 FL (ref 78–100)
MONOCYTES # BLD AUTO: 0.5 10E3/UL (ref 0–1.3)
MONOCYTES NFR BLD AUTO: 9 %
NEUTROPHILS # BLD AUTO: 3.9 10E3/UL (ref 1.6–8.3)
NEUTROPHILS NFR BLD AUTO: 73 %
NRBC # BLD AUTO: 0 10E3/UL
NRBC BLD AUTO-RTO: 0 /100
P AXIS - MUSE: NORMAL DEGREES
PLATELET # BLD AUTO: 167 10E3/UL (ref 150–450)
POTASSIUM BLD-SCNC: 3.5 MMOL/L (ref 3.4–5.3)
PR INTERVAL - MUSE: NORMAL MS
QRS DURATION - MUSE: 128 MS
QT - MUSE: 428 MS
QTC - MUSE: 465 MS
R AXIS - MUSE: -58 DEGREES
RBC # BLD AUTO: 3.35 10E6/UL (ref 4.4–5.9)
SODIUM SERPL-SCNC: 136 MMOL/L (ref 133–144)
SYSTOLIC BLOOD PRESSURE - MUSE: NORMAL MMHG
T AXIS - MUSE: -61 DEGREES
VENTRICULAR RATE- MUSE: 71 BPM
WBC # BLD AUTO: 5.4 10E3/UL (ref 4–11)

## 2021-12-29 PROCEDURE — 82962 GLUCOSE BLOOD TEST: CPT

## 2021-12-29 PROCEDURE — 250N000013 HC RX MED GY IP 250 OP 250 PS 637: Performed by: INTERNAL MEDICINE

## 2021-12-29 PROCEDURE — 85025 COMPLETE CBC W/AUTO DIFF WBC: CPT | Performed by: PHYSICIAN ASSISTANT

## 2021-12-29 PROCEDURE — 36415 COLL VENOUS BLD VENIPUNCTURE: CPT | Performed by: PHYSICIAN ASSISTANT

## 2021-12-29 PROCEDURE — 82310 ASSAY OF CALCIUM: CPT | Performed by: PHYSICIAN ASSISTANT

## 2021-12-29 PROCEDURE — 250N000011 HC RX IP 250 OP 636: Performed by: INTERNAL MEDICINE

## 2021-12-29 PROCEDURE — 96372 THER/PROPH/DIAG INJ SC/IM: CPT

## 2021-12-29 PROCEDURE — 99214 OFFICE O/P EST MOD 30 MIN: CPT | Mod: 24 | Performed by: INTERNAL MEDICINE

## 2021-12-29 PROCEDURE — 96366 THER/PROPH/DIAG IV INF ADDON: CPT

## 2021-12-29 PROCEDURE — 250N000013 HC RX MED GY IP 250 OP 250 PS 637: Performed by: PHYSICIAN ASSISTANT

## 2021-12-29 PROCEDURE — 85730 THROMBOPLASTIN TIME PARTIAL: CPT | Performed by: INTERNAL MEDICINE

## 2021-12-29 PROCEDURE — G0378 HOSPITAL OBSERVATION PER HR: HCPCS

## 2021-12-29 RX ORDER — CEPHALEXIN 500 MG/1
500 CAPSULE ORAL 4 TIMES DAILY
Qty: 22 CAPSULE | Refills: 0 | Status: ON HOLD | OUTPATIENT
Start: 2021-12-29 | End: 2022-01-07

## 2021-12-29 RX ORDER — FUROSEMIDE 40 MG
40 TABLET ORAL 2 TIMES DAILY
COMMUNITY
Start: 2021-12-29 | End: 2022-01-01

## 2021-12-29 RX ORDER — FUROSEMIDE 40 MG
40 TABLET ORAL DAILY
Status: DISCONTINUED | OUTPATIENT
Start: 2021-12-29 | End: 2021-12-29 | Stop reason: HOSPADM

## 2021-12-29 RX ORDER — ISOSORBIDE MONONITRATE 60 MG/1
30 TABLET, EXTENDED RELEASE ORAL DAILY
COMMUNITY
Start: 2021-12-30 | End: 2022-01-01

## 2021-12-29 RX ADMIN — LISINOPRIL 2.5 MG: 2.5 TABLET ORAL at 08:04

## 2021-12-29 RX ADMIN — FUROSEMIDE 40 MG: 40 TABLET ORAL at 08:06

## 2021-12-29 RX ADMIN — ASPIRIN 81 MG CHEWABLE TABLET 81 MG: 81 TABLET CHEWABLE at 08:05

## 2021-12-29 RX ADMIN — PANTOPRAZOLE SODIUM 40 MG: 40 TABLET, DELAYED RELEASE ORAL at 05:39

## 2021-12-29 RX ADMIN — LEVOTHYROXINE SODIUM 137 MCG: 137 TABLET ORAL at 08:04

## 2021-12-29 RX ADMIN — CLOPIDOGREL BISULFATE 75 MG: 75 TABLET ORAL at 08:06

## 2021-12-29 RX ADMIN — CEFAZOLIN SODIUM 2 G: 2 INJECTION, SOLUTION INTRAVENOUS at 05:36

## 2021-12-29 RX ADMIN — ISOSORBIDE MONONITRATE 60 MG: 60 TABLET, EXTENDED RELEASE ORAL at 08:05

## 2021-12-29 RX ADMIN — CARVEDILOL 25 MG: 25 TABLET, FILM COATED ORAL at 08:06

## 2021-12-29 ASSESSMENT — MIFFLIN-ST. JEOR: SCORE: 2034.95

## 2021-12-29 NOTE — DISCHARGE SUMMARY
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     Cardiomyopathy (H)      Cellulitis of left lower extremity 3/13/2018     Cerebral infarction (H)     eye sight decreased in peripheral of right side and blurry     Cerebral infarction (H) 2016     Diabetic ulcer of left midfoot associated with type 2 diabetes mellitus, with fat layer exposed (H) 3/13/2018     Essential hypertension 11/13/2002     Essential hypertension, benign 11/13/2002     Generalized osteoarthrosis, unspecified site 11/13/2002     Lightheadedness 12/27/2021     Microalbuminuria 3/13/2018    X1     Myocardial infarction (H)      Myocardial infarction (H) 06/28/2005     Neuropathy      Neuropathy 2016     Sepsis (H)      Sleep apnea     doesn't use it     Sleep apnea 2006     Substance abuse (H)      Substance abuse (H)      Syncope, unspecified syncope type 3/13/2018     Syncope, unspecified syncope type 03/13/2018     Thyroid disease     takes medicine     Thyroid disease 2005     Tobacco use disorder 11/13/2002     Type 2 diabetes mellitus (H) 2000     Type 2 diabetes mellitus with diabetic peripheral angiopathy without gangrene, unspecified long term insulin use status 2005     History of Present Illness   Jayro Elder is a 71 year old male admitted on 12/27/2021. He has a past medical history significant for diabetes mellitus type 2, atrial fibrillation, coronary artery disease, hypertension, hyperlipidemia, congestive heart failure, previous  stroke, peripheral neuropathy, and sleep apnea. Patient presented to the ED on 12/27/21 with a 2 week history of exertional chest pain concerning for unstable angina. Please see the admission history and physical for full details.    Hospital Course   Jayro Elder was admitted on 12/27/2021.  The following problems were addressed during his hospitalization:    #Chest pain, concern for unstable angina: known CAD with previous stenting, follows with Dr. King of cardiology as outpatient. Presented with chest pain occurring with light walking and relieved with rest. EKG without ischemic changes and troponins x2 negative.   - mild chest pain up to 2-3/10 at rest the morning of 12/28 with improvement but not complete resolution with sublingual nitroglycerin, no recurrent chest pain since then   - no significant arrhythmias noted on telemetry   - cardiology consulted, recommended coronary angiogram performed on 12/28 showing no changes since last coronary angiogram in 04/2021 with patent LAD, RCA, and OM3 stents with mild in-stent restenosis and no PCI  - continue PTA Plavix and add ASA  - initially on IV Heparin gtt, discontinue after coronary angiogram  - echocardiogram on 12/28 shows EF of 41%, apex and mid to distal septum are severely hypokinetic, moderate global hypokinesis, diastolic doppler findings suggest LV filling pressures are increased, moderately decreased RV systolic function, and severe pulmonary hypertension present   - continue PTA Lisinopril, Lasix, Atorvastatin, and Carvedilol  - per cardiology will increase Imdur to 60 mg daily   - keep scheduled follow up with cardiology FLORA on 1/7/21     #Ischemic cardiomyopathy  #Severe pulmonary hypertension: initial proBNP of 1790 without evidence of fluid overload on CXR or on exam on admission. Echo on 12/28 showed EF of 41% with moderate RV dysfunction, RVSP >55 mmHg. Weight increased 6 pounds from admission but overall down 10 pounds over the last two  months. Unclear if presenting symptoms related to heart failure exacerbation.  - increase Lasix to 80 mg daily (PTA 60 mg daily)  - check weight daily at home  - outpatient sleep study     #DM2 with peripheral neuropathy: BG stable in 160-180s over last day  - received Lantus 22 units prior to procedure  - resume PTA Lantus 44 units in AM  - medium sliding scale insulin  - blood glucose checks every 4 hours while NPO then TID with meals and at bedtime      #Possible left first toe cellulitis: appears partially chronic related to PAD, recently evaluated at [facility] by vascular medicine where it is noted that the patient has a callus over his left great toe with ecchymosis beneath that was bluntly removed in office and no blood or ulcer present underneath. Mild erythema noted on exam on admission and again on 12/28, but no associated warmth or swelling or extension outside of left great toe. Erythema significantly improved prior to discharge, possible infection component.  - received IV Ancef during stay then discontinued due to lower suspicion for cellulitis   - due to significant improvement in erythema after antibiotics will discharge on PO Keflex for an additional 6 days  - no history of gout and uric acid only mildly elevated at 9.1 as well no significant pain    - recommend outpatient f/u with primary podiatrist Dr. Mckeon for monitoring      #Anemia: Hgb this stay of 9.9 >> 9.7 which is slightly lower than baseline of Hgb of 10-11, continue to monitor with being on Plavix and Apixaban as outpatient. No signs of bleeding during stay. Previous iron studies in 04/2021 WNL.   - recheck Hgb when seen in f/u by PCP, if continues to be low consider repeat anemia labs and outpatient colonoscopy      #Hypothyroidism: resume Levothyroxine      #HTN: BP intermittently elevated, continue PTA Lisinopril, Carvedilol, and Imdur at increased dose      #GERD: resume PTA Protonix daily     #Hypokalemia: initial potassium of 3.2, WNL  "after replacement     #CKD stage II: baseline creatinine of 1.2, stable     #A-fib: rate controlled, in A-fib on telemetry  - Apixaban initially held on 12/28 with plans for coronary angiogram, resumed on 12/29  - continue PTA Carvedilol     Pending Results   None    Code Status   Full Code       Primary Care Physician   Physician No Ref-Primary    Exam:    /74 (BP Location: Left arm)   Pulse 71   Temp 97.7  F (36.5  C) (Oral)   Resp 16   Ht 1.93 m (6' 4\")   Wt 117.8 kg (259 lb 12.8 oz)   SpO2 97%   BMI 31.62 kg/m    GEN:  Alert, oriented x 3, appears comfortable, NAD.  HEENT:  Normocephalic/atraumatic, no scleral icterus, no nasal discharge, mouth moist.  CV: rate controlled, irregularly irregular, no murmur or JVD.  S1 + S2 noted, no S3 or S4.  LUNGS:  Clear to auscultation bilaterally without rales/rhonchi/wheezing/retractions.  Symmetric chest rise on inhalation noted.  ABD:  Active bowel sounds, soft, non-tender/non-distended.  No rebound/guarding/rigidity.  EXT: Minimal erythema of left great toe with callus over medial aspect with underlying bruising, no warmth over area. No pitting edema.  No cyanosis.  No acute joint synovitis noted.  SKIN:  Dry to touch, no exanthems noted in the visualized areas.    Discharge Disposition   Discharged to home    Consultations This Hospital Stay   CARDIOLOGY IP CONSULT  PHARMACY IP CONSULT  PHARMACY IP CONSULT  PHARMACY IP CONSULT  PHARMACY IP CONSULT  PHARMACY IP CONSULT  PHARMACY IP CONSULT  SMOKING CESSATION PROGRAM IP CONSULT    Time Spent on this Encounter   IAbiola PA-C, personally saw the patient today and spent greater than 30 minutes discharging this patient.    Discharge Orders      Basic metabolic panel     Hemoglobin     SLEEP EVALUATION & MANAGEMENT REFERRAL - ADULT -      Reason for your hospital stay    You were admitted for a 2-week history of exertional chest pain concerning for worsening underlying heart disease.  Your initial " work-up included an EKG and serial troponin levels (heart enzymes) that were negative with low suspicion that you were having a heart attack.  Based on your symptoms you were seen by cardiology who recommended a a coronary angiogram for further assessment which showed patent stents and no significant changes from your last angiogram in 04/2021.  Based on your presenting symptoms it is recommended you increase your Lasix to 80 mg daily and your Imdur to 60 mg daily.  You will need to monitor your weight daily and closely follow-up with cardiology to monitor if you are having ongoing symptoms.  During your stay it was noted that you had redness of your left big toe with some associated pain.  It was felt that you possibly had a mild cellulitis causing your symptoms.  You were initially treated with IV antibiotics. It is felt that this was unlikely related to an acute gout flare. You will discharge on an additional 5 days of antibiotics to complete for treatment.     Follow-up and recommended labs and tests     Follow up with primary care provider within 7 days for hospital follow- up.  The following labs/tests are recommended: Hgb, should monitor closely and if remains lower or drops further would consider further anemia workup including colonoscopy.    Keep scheduled follow up with cardiology FLORA on 1/7/21.  Follow up with Dr. Mckeon of podiatry in the next 3-4 weeks to reassess calluses and pressure wounds of bilateral feet.     Activity    Your activity upon discharge: activity as tolerated and elevated left lower leg/wear off loading shoe to help calluses heal     Monitor and record    weight every day     Discharge Instructions    1. Recommend outpatient sleep study for reassessment of sleep apnea   2. Increase daily Lasix to 80 mg total, 40 mg twice daily  3. Increase Imdur to 60 mg daily (from 30 mg)     Diet    Follow this diet upon discharge: Orders Placed This Encounter      Moderate Consistent Carb (60 g CHO  per Meal) Diet     Discharge Medications   Current Discharge Medication List      START taking these medications    Details   cephALEXin (KEFLEX) 500 MG capsule Take 1 capsule (500 mg) by mouth 4 times daily for 6 days  Qty: 22 capsule, Refills: 0    Associated Diagnoses: Cellulitis of toe of left foot         CONTINUE these medications which have CHANGED    Details   furosemide (LASIX) 40 MG tablet Take 1 tablet (40 mg) by mouth 2 times daily      isosorbide mononitrate (IMDUR) 60 MG 24 hr tablet Take 1 tablet (60 mg) by mouth daily         CONTINUE these medications which have NOT CHANGED    Details   ammonium lactate (AMLACTIN) 12 % external cream Apply topically 2 times daily  Qty: 385 g, Refills: 3    Associated Diagnoses: Callus of foot; Dry skin; Diabetic polyneuropathy associated with type 2 diabetes mellitus (H); Type 2 diabetes mellitus with diabetic peripheral angiopathy without gangrene, with long-term current use of insulin (H)      apixaban ANTICOAGULANT (ELIQUIS) 5 MG tablet Take 1 tablet (5 mg) by mouth 2 times daily    Associated Diagnoses: Chronic atrial fibrillation (H)      atorvastatin (LIPITOR) 40 MG tablet Take 1 tablet (40 mg) by mouth every evening  Qty: 90 tablet, Refills: 3    Associated Diagnoses: Cerebrovascular accident (CVA) due to embolism of left posterior cerebral artery (H)      carvedilol (COREG) 25 MG tablet Take 1 tablet (25 mg) by mouth 2 times daily (with meals)  Qty: 180 tablet, Refills: 3    Associated Diagnoses: Essential hypertension      clopidogrel (PLAVIX) 75 MG tablet Take 1 tablet (75 mg) by mouth daily  Qty: 90 tablet, Refills: 3    Associated Diagnoses: Unstable angina (H)      gabapentin (NEURONTIN) 300 MG capsule TAKE 2-3 CAPSULES (600-900 MG) BY MOUTH AT BEDTIME  Qty: 180 capsule, Refills: 1    Associated Diagnoses: Type 2 diabetes mellitus with diabetic nephropathy, with long-term current use of insulin (H); Restless legs syndrome      !! insulin aspart (NOVOLOG  FLEXPEN) 100 UNIT/ML pen Inject Subcutaneous 3 times daily (with meals) Sliding Scale  Do not give sliding scale insulin if Pre-Meal BG less than 140.   For Pre-Meal:   - 200 give 2 units.    - 250 give 4 units.    - 300 give 6 units.    - 350 give 8 units.    - 400 give 10 units.      !! insulin aspart (NOVOLOG PEN) 100 UNIT/ML pen Inject 12 Units Subcutaneous 2 times daily (with meals) With breakfast and lunch    Associated Diagnoses: Type 2 diabetes mellitus with diabetic peripheral angiopathy without gangrene, with long-term current use of insulin (H)      !! insulin aspart (NOVOLOG PEN) 100 UNIT/ML pen Inject 14 Units Subcutaneous daily (with dinner)    Associated Diagnoses: Type 2 diabetes mellitus with diabetic peripheral angiopathy without gangrene, with long-term current use of insulin (H)      insulin glargine (LANTUS PEN) 100 UNIT/ML pen Inject 44 units subcutaneously once every morning    Comments: If Lantus is not covered by insurance, may substitute Basaglar at same dose and frequency.        levothyroxine (SYNTHROID, LEVOTHROID) 137 MCG tablet Take 1 tablet by mouth once every evening  Qty: 90 tablet, Refills: 3      lisinopril (ZESTRIL) 2.5 MG tablet Take 1 tablet (2.5 mg) by mouth daily  Qty: 90 tablet, Refills: 3    Associated Diagnoses: Ischemic cardiomyopathy      pantoprazole (PROTONIX) 40 MG EC tablet TAKE 1 TABLET BY MOUTH EVERY MORNING BEFORE BREAKFAST  Qty: 90 tablet, Refills: 1    Associated Diagnoses: Gastroesophageal reflux disease      triamcinolone (KENALOG) 0.1 % external cream Apply topically 2 times daily  Qty: 30 g, Refills: 0    Associated Diagnoses: Rash and nonspecific skin eruption      Continuous Blood Gluc  (FREESTYLE CLARITA 2 READER SYSTM) DRAGAN 1 Device daily  Qty: 1 each, Refills: 3    Associated Diagnoses: Type 2 diabetes mellitus with diabetic peripheral angiopathy without gangrene, with long-term current use of insulin (H)       Continuous Blood Gluc Sensor (FREESTYLE CLARITA 2 SENSOR SYSTM) MISC 1 Units 4 times daily (before meals and nightly)  Qty: 1 each, Refills: 3    Associated Diagnoses: Type 2 diabetes mellitus with diabetic peripheral angiopathy without gangrene, with long-term current use of insulin (H)      insulin pen needle (31G X 6 MM) 31G X 6 MM miscellaneous Use  as directed.  Qty: 100 each, Refills: 11    Associated Diagnoses: Type 2 diabetes mellitus with hyperosmolarity without coma, with long-term current use of insulin (H)      nitroGLYcerin (NITROSTAT) 0.4 MG sublingual tablet For chest pain place 1 tablet under the tongue every 5 minutes for 3 doses. If symptoms persist 5 minutes after 1st dose call 911.  Qty: 15 tablet, Refills: 3    Associated Diagnoses: Coronary artery disease involving native coronary artery of native heart with angina pectoris (H)       !! - Potential duplicate medications found. Please discuss with provider.        Allergies   Allergies   Allergen Reactions     No Known Allergies      Data   Results for orders placed or performed during the hospital encounter of 12/27/21   XR Chest 2 Views     Status: None    Narrative    CHEST TWO VIEWS 12/27/2021 3:47 PM     HISTORY: Chest pain.    COMPARISON: 4/7/2021.      Impression    IMPRESSION: There are no acute infiltrates. The cardiac silhouette is  not enlarged. Pulmonary vasculature is unremarkable.    YE GAONA MD         SYSTEM ID:  [id number]   XR Toe Left G/E 2 Views     Status: None    Narrative    EXAM: XR TOE LEFT G/E 2 VIEWS  LOCATION: [facility]  DATE/TIME: 12/27/2021 5:17 PM    INDICATION: Erythema, swelling noted to great toe, history of diabetes, neuropathy.  COMPARISON: None.      Impression    IMPRESSION: Soft tissue swelling of the great toe. No evidence of acute fracture. Mild to moderate first MTP degenerative changes. Mild irregularity of the medial aspect of the head of the first metatarsal could be chronic  erosive change. Recommend   clinical correlation for history of gout.    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    No acute lytic or destructive lesions. Moderate midfoot degenerative changes.   Symptomatic; Yes; 12/24/2021 Influenza A/B & SARS-CoV2 (COVID-19) Virus PCR Multiplex Nasopharyngeal     Status: Normal    Specimen: Nasopharyngeal; Swab   Result Value Ref Range    Influenza A PCR Negative Negative    Influenza B PCR Negative Negative    SARS CoV2 PCR Negative Negative    Narrative    Testing was performed using the rianna SARS-CoV-2 & Influenza A/B Assay on the rianna Olive System. This test should be ordered for the detection of SARS-CoV-2 and influenza viruses in individuals who meet clinical and/or epidemiological criteria. Test performance is unknown in asymptomatic patients. This test is for in vitro diagnostic use under the FDA EUA for laboratories certified under CLIA to perform moderate and/or high complexity testing. This test has not been FDA cleared or approved. A negative result does not rule out the presence of PCR inhibitors in the specimen or target RNA in concentration below the limit of detection for the assay. If only one viral target is positive but coinfection with multiple targets is suspected, the sample should be re-tested with another FDA cleared, approved or authorized test, if coinfection would change clinical management. [facility] are certified under the Clinical Laboratory Improvement Amendments of 1988 (CLIA-88) as  qualified to perform moderate and/or high complexity laboratory testing.   Troponin I     Status: Normal   Result Value Ref Range    Troponin I High Sensitivity 19 <79 ng/L   Comprehensive metabolic panel     Status: Abnormal   Result Value Ref Range    Sodium 135 133 - 144 mmol/L    Potassium 3.2 (L) 3.4 - 5.3 mmol/L    Chloride 100 94 - 109 mmol/L    Carbon Dioxide (CO2) 29 20 - 32 mmol/L    Anion Gap 6 3 - 14 mmol/L    Urea  Nitrogen 21 7 - 30 mg/dL    Creatinine 1.22 0.66 - 1.25 mg/dL    Calcium 8.3 (L) 8.5 - 10.1 mg/dL    Glucose 212 (H) 70 - 99 mg/dL    Alkaline Phosphatase 118 40 - 150 U/L    AST 19 0 - 45 U/L    ALT 38 0 - 70 U/L    Protein Total 7.3 6.8 - 8.8 g/dL    Albumin 3.4 3.4 - 5.0 g/dL    Bilirubin Total 1.7 (H) 0.2 - 1.3 mg/dL    GFR Estimate 63 >60 mL/min/1.73m2   Nt probnp inpatient (BNP)     Status: Abnormal   Result Value Ref Range    N terminal Pro BNP Inpatient 1,797 (H) 0 - 900 pg/mL   CBC with platelets and differential     Status: Abnormal   Result Value Ref Range    WBC Count 8.4 4.0 - 11.0 10e3/uL    RBC Count 3.67 (L) 4.40 - 5.90 10e6/uL    Hemoglobin 10.9 (L) 13.3 - 17.7 g/dL    Hematocrit 33.1 (L) 40.0 - 53.0 %    MCV 90 78 - 100 fL    MCH 29.7 26.5 - 33.0 pg    MCHC 32.9 31.5 - 36.5 g/dL    RDW 13.8 10.0 - 15.0 %    Platelet Count 212 150 - 450 10e3/uL    % Neutrophils 82 %    % Lymphocytes 9 %    % Monocytes 8 %    % Eosinophils 1 %    % Basophils 0 %    % Immature Granulocytes 0 %    NRBCs per 100 WBC 0 <1 /100    Absolute Neutrophils 6.9 1.6 - 8.3 10e3/uL    Absolute Lymphocytes 0.7 (L) 0.8 - 5.3 10e3/uL    Absolute Monocytes 0.7 0.0 - 1.3 10e3/uL    Absolute Eosinophils 0.1 0.0 - 0.7 10e3/uL    Absolute Basophils 0.0 0.0 - 0.2 10e3/uL    Absolute Immature Granulocytes 0.0 <=0.4 10e3/uL    Absolute NRBCs 0.0 10e3/uL   Extra Blue Top Tube     Status: None   Result Value Ref Range    Hold Specimen JIC    Extra Green Top (Lithium Heparin) Tube     Status: None   Result Value Ref Range    Hold Specimen JIC    Troponin I     Status: Normal   Result Value Ref Range    Troponin I High Sensitivity 21 <79 ng/L   Uric acid     Status: Abnormal   Result Value Ref Range    Uric Acid 9.1 (H) 3.5 - 7.2 mg/dL   Potassium     Status: Normal   Result Value Ref Range    Potassium 3.4 3.4 - 5.3 mmol/L   Glucose by meter     Status: Abnormal   Result Value Ref Range    GLUCOSE BY METER POCT 204 (H) 70 - 99 mg/dL   Partial  thromboplastin time     Status: Abnormal   Result Value Ref Range    aPTT 58 (H) 22 - 38 Seconds   Glucose by meter     Status: Abnormal   Result Value Ref Range    GLUCOSE BY METER POCT 201 (H) 70 - 99 mg/dL   Basic metabolic panel     Status: Abnormal   Result Value Ref Range    Sodium 136 133 - 144 mmol/L    Potassium 3.1 (L) 3.4 - 5.3 mmol/L    Chloride 101 94 - 109 mmol/L    Carbon Dioxide (CO2) 29 20 - 32 mmol/L    Anion Gap 6 3 - 14 mmol/L    Urea Nitrogen 19 7 - 30 mg/dL    Creatinine 1.12 0.66 - 1.25 mg/dL    Calcium 8.7 8.5 - 10.1 mg/dL    Glucose 186 (H) 70 - 99 mg/dL    GFR Estimate 70 >60 mL/min/1.73m2   CBC with platelets     Status: Abnormal   Result Value Ref Range    WBC Count 5.9 4.0 - 11.0 10e3/uL    RBC Count 3.38 (L) 4.40 - 5.90 10e6/uL    Hemoglobin 9.9 (L) 13.3 - 17.7 g/dL    Hematocrit 30.7 (L) 40.0 - 53.0 %    MCV 91 78 - 100 fL    MCH 29.3 26.5 - 33.0 pg    MCHC 32.2 31.5 - 36.5 g/dL    RDW 13.7 10.0 - 15.0 %    Platelet Count 175 150 - 450 10e3/uL   Magnesium     Status: Normal   Result Value Ref Range    Magnesium 1.9 1.6 - 2.3 mg/dL   Glucose by meter     Status: Abnormal   Result Value Ref Range    GLUCOSE BY METER POCT 167 (H) 70 - 99 mg/dL   Activated clotting time celite, POCT     Status: Normal   Result Value Ref Range    Activated Clotting Time (Celite) POCT 139 74 - 150 seconds   Hemoglobin A1c     Status: Abnormal   Result Value Ref Range    Hemoglobin A1C 8.5 (H) 0.0 - 5.6 %   Glucose by meter     Status: Abnormal   Result Value Ref Range    GLUCOSE BY METER POCT 160 (H) 70 - 99 mg/dL   Glucose by meter     Status: Abnormal   Result Value Ref Range    GLUCOSE BY METER POCT 203 (H) 70 - 99 mg/dL   Glucose by meter     Status: Abnormal   Result Value Ref Range    GLUCOSE BY METER POCT 215 (H) 70 - 99 mg/dL   Glucose by meter     Status: Abnormal   Result Value Ref Range    GLUCOSE BY METER POCT 170 (H) 70 - 99 mg/dL   Partial thromboplastin time     Status: Normal   Result Value  Ref Range    aPTT 31 22 - 38 Seconds   CBC with platelets and differential     Status: Abnormal   Result Value Ref Range    WBC Count 5.4 4.0 - 11.0 10e3/uL    RBC Count 3.35 (L) 4.40 - 5.90 10e6/uL    Hemoglobin 9.7 (L) 13.3 - 17.7 g/dL    Hematocrit 31.0 (L) 40.0 - 53.0 %    MCV 93 78 - 100 fL    MCH 29.0 26.5 - 33.0 pg    MCHC 31.3 (L) 31.5 - 36.5 g/dL    RDW 13.8 10.0 - 15.0 %    Platelet Count 167 150 - 450 10e3/uL    % Neutrophils 73 %    % Lymphocytes 14 %    % Monocytes 9 %    % Eosinophils 4 %    % Basophils 0 %    % Immature Granulocytes 0 %    NRBCs per 100 WBC 0 <1 /100    Absolute Neutrophils 3.9 1.6 - 8.3 10e3/uL    Absolute Lymphocytes 0.8 0.8 - 5.3 10e3/uL    Absolute Monocytes 0.5 0.0 - 1.3 10e3/uL    Absolute Eosinophils 0.2 0.0 - 0.7 10e3/uL    Absolute Basophils 0.0 0.0 - 0.2 10e3/uL    Absolute Immature Granulocytes 0.0 <=0.4 10e3/uL    Absolute NRBCs 0.0 10e3/uL   Glucose by meter     Status: Abnormal   Result Value Ref Range    GLUCOSE BY METER POCT 190 (H) 70 - 99 mg/dL   Basic metabolic panel     Status: Abnormal   Result Value Ref Range    Sodium 136 133 - 144 mmol/L    Potassium 3.5 3.4 - 5.3 mmol/L    Chloride 101 94 - 109 mmol/L    Carbon Dioxide (CO2) 30 20 - 32 mmol/L    Anion Gap 5 3 - 14 mmol/L    Urea Nitrogen 18 7 - 30 mg/dL    Creatinine 1.02 0.66 - 1.25 mg/dL    Calcium 8.8 8.5 - 10.1 mg/dL    Glucose 196 (H) 70 - 99 mg/dL    GFR Estimate 79 >60 mL/min/1.73m2   Glucose by meter     Status: Abnormal   Result Value Ref Range    GLUCOSE BY METER POCT 185 (H) 70 - 99 mg/dL   EKG 12-lead, tracing only     Status: None   Result Value Ref Range    Systolic Blood Pressure  mmHg    Diastolic Blood Pressure  mmHg    Ventricular Rate 93 BPM    Atrial Rate 110 BPM    VA Interval  ms    QRS Duration 142 ms     ms    QTc 507 ms    P Axis  degrees    R AXIS -58 degrees    T Axis -37 degrees    Interpretation ECG       Atrial fibrillation  Left axis deviation  Right bundle branch  block  Anteroseptal infarct (cited on or before 29-JUN-2005)  Abnormal ECG  When compared with ECG of 08-APR-2021 02:45,  Questionable change in initial forces of Septal leads  T wave inversion less evident in Anterior leads  Confirmed by - EMERGENCY ROOM, PHYSICIAN ([id number]),  MARJORIE JACKSON ([id number]) on 12/28/2021 8:47:37 AM     EKG 12-lead, tracing only     Status: None   Result Value Ref Range    Systolic Blood Pressure  mmHg    Diastolic Blood Pressure  mmHg    Ventricular Rate 71 BPM    Atrial Rate 214 BPM    LA Interval  ms    QRS Duration 128 ms     ms    QTc 465 ms    P Axis  degrees    R AXIS -58 degrees    T Axis -61 degrees    Interpretation ECG       Atrial fibrillation  Left axis deviation  Right bundle branch block  Inferior infarct , age undetermined  Anterior infarct , age undetermined  Abnormal ECG    Confirmed by MD DANIEL, ELIZA ([id number]),  ENIO CANDELARIA ([id number]) on 12/29/2021 7:16:49 AM     Cardiology IP Consult: Patient to be seen: Routine - within 24 hours; chest pain, known CAD; Consultant may enter orders: Yes; Requesting provider? Hospitalist (if different from attending physician)     Status: None ()    Livan Sharif MD     12/28/2021  1:35 PM  [facility]    CARDIOLOGY CONSULT    Date of Admission:  12/27/2021  Date of Consult: December 28, 2021    ASSESSMENT:  71-year-old male with extensive CAD and previous stenting is seen   for 2 weeks of exertional unstable angina.  His symptoms are   quite convincing for angina, the chest pain comes on with light   walking, it is relieved with rest.  EKG shows no acute changes,   troponins have been negative.    We talked about further evaluation, coronary angiogram was   recommended.  A stress test would probably not be sensitive   enough to detect any new stenosis with his extensive history.    The risk and benefit of the procedure was explained in detail, he   understands and wishes to  proceed.    He does have a little redness to his left great toe, but this   does not appear to be a severe cellulitis.  He has no fever and   no elevated white count, it should be OK for angiogram.    His last dose of Eliquis was the morning of December 27.    RECOMMENDATIONS:  1.  CAD with 2 weeks of exertional chest pain, suspicious for   angina  -Coronary angiogram today  -Continue clopidogrel, also start aspirin  -Echocardiogram pending  -Continue heparin until Cath Lab  -Continue other cardiac medications    2.  A. Fib  -Rates controlled  - Holding Eliquis for angiogram    Livan King MD  Cardiology [facility]  Pager:  [phone]  Text Page  December 28, 2021    Addendum:  Angiogram today shows moderate disease, no significant change   from April, no intervention needed. Unclear what is causing his   chest pain. EDP was 19, mild fluid retention potentially could   have caused a little angina. Symptoms may not be cardiac either.   Imdur will be increased, otherwise he is on good medical therapy.    Plan to observe overnight, likely home tomorrow if symptom-free.    Livan King MD  Cardiology [facility]  Pager: [phone]  Text Page  December 28, 2021    CODE STATUS:  Full Code    REASON FOR CONSULT: CAD, chest pain    PRIMARY CARE PHYSICIAN:  Physician No Ref-Primary    HISTORY OF PRESENT ILLNESS:  71 year old male with known coronary disease is seen for chest   pain.  He has A. fib, diabetes type 2, hypertension, CVA, sleep   apnea, CKD.    2005 he had an MI with EF of 35 to 40%, history of stenting of   the LAD and OM.  July 2019 he underwent 3 stents to the RCA.  10   stents total.    Echo April 2021 showed EF 40 to 45% with mild global hypokinesis   and septal akinesis, no significant valve disease.  Angiogram   then showed three-vessel disease with mild diffuse 50% in-stent   restenosis and focal 75% in-stent restenosis of the mid LAD with   FFR 0.87, 30% proximal RCA, 40% mid in-stent  restenosis of the   RCA, 30% distal RCA in-stent restenosis, 25% OM3 in-stent   restenosis, no intervention performed.    He had been doing fairly well from a cardiac perspective in   recent months.  His walking is somewhat limited by previous toe   amputation.  He will go short distances, but does nothing   strenuous.  He denies any lightheadedness, dizziness, or syncope.    In the past 2 weeks he has had exertional chest pain in the upper   chest and neck.  This comes on walking to his mailbox and doing   light activity around his home.  Symptoms resolved with rest.  He   has not tried nitro.  He has had a few episodes of resting pain,   but mostly exertional.    He presented to the ED, he was hemodynamically stable.  EKG   showed no changes and troponins have been negative.  He had a   little discomfort overnight, but none this morning.  Had some   redness in his left toe, antibiotics were started for possible   cellulitis.    PAST MEDICAL HISTORY:  I have reviewed this patient's medical history and updated it   with pertinent information if needed.   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     Cardiomyopathy (H)      Cellulitis of left lower extremity 3/13/2018     Cerebral infarction (H)     eye sight decreased in peripheral of right side and blurry     Cerebral infarction (H) 2016     Diabetic ulcer of left midfoot associated with type 2 diabetes   mellitus, with fat layer exposed (H) 3/13/2018     Essential hypertension 11/13/2002     Essential hypertension, benign 11/13/2002     Generalized osteoarthrosis, unspecified site 11/13/2002     Lightheadedness 12/27/2021     Microalbuminuria 3/13/2018    X1     Myocardial infarction (H)      Myocardial infarction (H) 06/28/2005     Neuropathy      Neuropathy 2016     Sepsis (H)       Sleep apnea     doesn't use it     Sleep apnea 2006     Substance abuse (H)      Substance abuse (H)      Syncope, unspecified syncope type 3/13/2018     Syncope, unspecified syncope type 03/13/2018     Thyroid disease     takes medicine     Thyroid disease 2005     Tobacco use disorder 11/13/2002     Type 2 diabetes mellitus (H) 2000     Type 2 diabetes mellitus with diabetic peripheral angiopathy   without gangrene, unspecified long term insulin use status 2005     PAST SURGICAL HISTORY:  I have reviewed this patient's surgical history and updated it   with pertinent information if needed.  Past Surgical History:   Procedure Laterality Date     AMPUTATE TOE(S) Right 1/6/2019    Procedure: Right open second toe partial amputation;  Surgeon:   Sabino Garcia DPM;  Location: [facility] OR     AMPUTATE TOE(S) Right 1/8/2019    Procedure: 1.  Revision right second toe amputation with   resection of distal half of proximal phalanx with full closure.      2.  Debridement of callus/preulcerative lesion, distal left   second toe and plantar left first metatarsophalangeal joint.;    Surgeon: Ryan Bhagat DPM;  Location: [facility] OR     AMPUTATE TOE(S) Right 3/12/2019    Procedure: Partial right fourth toe amputation.;  Surgeon:   Ryan Bhagat DPM;  Location: [facility] OR     AMPUTATE TOE(S) Right 5/6/2019    Procedure: Right partial third toe amputation;  Surgeon: Татьяна Mckeon DPM, Podiatry/Foot and Ankle Surgery;  Location: [facility] OR     AMPUTATE TOE(S) Left 4/18/2020    Procedure: LEFT SECOND TOE AMPUTATION;  Surgeon: Ryan Bhagat DPM;  Location: [facility] OR     AMPUTATE TOE(S) Left 5/8/2021    Procedure: 1.  Amputation of the left third toe at the level of   the proximal interphalangeal joint. 2.  Excisional debridement of   less than 20 square cm down to the level of the deeper skin,   plantar left foot.;  Surgeon: Kwasi Root DPM;  Location:   [facility] OR     AMPUTATE TOE(S) Right 2019    4 toes amputation       ANGIOGRAM  6/29/05    subtotal occ.mid LAD,SUSAN mid LAD     ANGIOGRAM  7/05    mild CAD,patent stent,no flow-limiting lesions,sev.LV dysf.LAD   enlarged     ANGIOGRAM  2/09    Sev.single vessel disease,Mod LV dysf.distal LAD 90%,70-75% mid   lad just before prev stent,SUSAN to prox.mid LAD, endeavor to   distal LAD     ANGIOGRAM  11/13/13    restenosis, stent LAD     BACK SURGERY      back surgery x3     CARDIAC CATHETERIZATION  07/08/2019     CARDIAC CATHETERIZATION Left 06/13/2017     CARDIAC CATHETERIZATION  2005,2009,20013     CORONARY STENT PLACEMENT  07/08/2019     CV CORONARY ANGIOGRAM N/A 7/8/2019    Procedure: Coronary Angiogram;  Surgeon: Jose Alfredo Crocktet MD;    Location:  HEART CARDIAC CATH LAB     CV CORONARY ANGIOGRAM N/A 4/9/2021    Procedure: CV CORONARY ANGIOGRAM;  Surgeon: Warren Paulson MD;  Location:  HEART CARDIAC CATH LAB     CV LEFT HEART CATH N/A 7/8/2019    Procedure: Left Heart Cath;  Surgeon: Jose Alfredo Crockett MD;    Location:  HEART CARDIAC CATH LAB     CV PCI ASPIRATION THROMECTOMY N/A 7/8/2019    Procedure: PCI Aspiration Thrombectomy;  Surgeon: Jose Alfredo Crockett MD;  Location:  HEART CARDIAC CATH LAB     CV PCI STENT DRUG ELUTING N/A 7/8/2019    Procedure: PCI Stent Drug Eluting;  Surgeon: Jose Alfredo Crockett MD;  Location:  HEART CARDIAC CATH LAB     HEART CATH LEFT HEART CATH  06/13/2017    SUSAN to OM3     INCISION AND DRAINAGE TOE, COMBINED Bilateral 9/11/2018    Procedure: COMBINED INCISION AND DRAINAGE TOE;  1) Irrigation   and debridement ulcer left great toe  2) Amputation 3rd toe right foot at distal interphalangeal joint   level;  Surgeon: Miki Harp MD;  Location:  OR     IRRIGATION AND DEBRIDEMENT FOOT, COMBINED Left 3/12/2019    Procedure: Debridement of left foot ulcer sub first MPJ 2 x 2.5   cm down to and including subcutaneous tissue, callus debridement   for preulcerative lesion, left second toe.;  Surgeon:   Ryan Bhagat,  DPM;  Location: [facility] OR     ORTHOPEDIC SURGERY      bunion surgery both feet     ORTHOPEDIC SURGERY      left shoulder     OTHER SURGICAL HISTORY  9 years old    cancer tumor mouth     SHOULDER ARTHROSCOPY W/ ROTATOR CUFF REPAIR Left      SOFT TISSUE SURGERY      debridement of toe numerous time     VASCULAR SURGERY      7 stents in heart     VASCULAR SURGERY       [facility] NONSPECIFIC PROCEDURE      Laminectomy x 3 - (1983 x 2 & )     [facility] NONSPECIFIC PROCEDURE Bilateral 1998    Bunionectomy     [facility] NONSPECIFIC PROCEDURE  1959    Gingival surgery at age 9       HOME MEDICATIONS:  Prior to Admission Medications   Prescriptions Last Dose Informant Patient Reported? Taking?   Continuous Blood Gluc  (FREESTYLE CLARITA 2 READER SYSTM)   DRAGAN   No No   Si Device daily   Continuous Blood Gluc Sensor (FREESTYLE CLARITA 2 SENSOR SYSTM)   MISC   No No   Si Units 4 times daily (before meals and nightly)   ammonium lactate (AMLACTIN) 12 % external cream Past Week at   Unknown time  No Yes   Sig: Apply topically 2 times daily   apixaban ANTICOAGULANT (ELIQUIS) 5 MG tablet 2021 at am  No   Yes   Sig: Take 1 tablet (5 mg) by mouth 2 times daily   atorvastatin (LIPITOR) 40 MG tablet 2021 at am  No Yes   Sig: Take 1 tablet (40 mg) by mouth every evening   carvedilol (COREG) 25 MG tablet 2021 at am  No Yes   Sig: Take 1 tablet (25 mg) by mouth 2 times daily (with meals)   clopidogrel (PLAVIX) 75 MG tablet 2021 at an  No Yes   Sig: Take 1 tablet (75 mg) by mouth daily   furosemide (LASIX) 40 MG tablet 2021 at am  No Yes   Sig: Take 1 tablet (40 mg) in the morning and 1/2 tablet (20 mg)   in the afternoon   gabapentin (NEURONTIN) 300 MG capsule 2021 at 2caps hs  No   Yes   Sig: TAKE 2-3 CAPSULES (600-900 MG) BY MOUTH AT BEDTIME   insulin aspart (NOVOLOG FLEXPEN) 100 UNIT/ML pen 2021 at   Unknown time  Yes Yes   Sig: Inject Subcutaneous 3 times daily (with meals) Sliding Scale  Do  not give sliding scale insulin if Pre-Meal BG less than 140.   For Pre-Meal:   - 200 give 2 units.    - 250 give 4 units.    - 300 give 6 units.    - 350 give 8 units.    - 400 give 10 units.   insulin aspart (NOVOLOG PEN) 100 UNIT/ML pen 12/27/2021 at am  No   Yes   Sig: Inject 12 Units Subcutaneous 2 times daily (with meals) With   breakfast and lunch   insulin aspart (NOVOLOG PEN) 100 UNIT/ML pen 12/26/2021 at pm  No   Yes   Sig: Inject 14 Units Subcutaneous daily (with dinner)   insulin glargine (LANTUS PEN) 100 UNIT/ML pen 12/27/2021 at am    Yes Yes   Sig: Inject 44 units subcutaneously once every morning   insulin pen needle (31G X 6 MM) 31G X 6 MM miscellaneous   No No   Sig: Use  as directed.   isosorbide mononitrate (IMDUR) 30 MG 24 hr tablet 12/27/2021 at   am  No Yes   Sig: Take 1 tablet (30 mg) by mouth daily   levothyroxine (SYNTHROID, LEVOTHROID) 137 MCG tablet 12/26/2021   at pm Self Yes Yes   Sig: Take 1 tablet by mouth once every evening   lisinopril (ZESTRIL) 2.5 MG tablet 12/27/2021 at am  No Yes   Sig: Take 1 tablet (2.5 mg) by mouth daily   nitroGLYcerin (NITROSTAT) 0.4 MG sublingual tablet   No No   Sig: For chest pain place 1 tablet under the tongue every 5   minutes for 3 doses. If symptoms persist 5 minutes after 1st dose   call 911.   pantoprazole (PROTONIX) 40 MG EC tablet 12/27/2021 at am  No Yes   Sig: TAKE 1 TABLET BY MOUTH EVERY MORNING BEFORE BREAKFAST   triamcinolone (KENALOG) 0.1 % external cream Past Week at Unknown   time  No Yes   Sig: Apply topically 2 times daily      Facility-Administered Medications: None       ALLERGIES:  Allergies   Allergen Reactions     No Known Allergies        SOCIAL HISTORY:  I have reviewed this patient's social history and updated it with   pertinent information if needed. Jayro MESSINA Jael  reports that   he quit smoking about 16 years ago. His smoking use included   cigarettes. He started smoking about 42 years ago. He  has a 25.00   pack-year smoking history. He has never used smokeless tobacco.   He reports current alcohol use of about 4.0 standard drinks of   alcohol per week. He reports that he does not use drugs.    FAMILY HISTORY:  I have reviewed this patient's family history and updated it with   pertinent information if needed.   Family History   Problem Relation Age of Onset     Cancer - colorectal Sister      Thyroid Disease Brother      Cardiovascular Brother      Diabetes Brother      Cancer Father         tumor in chest and throat     Arthritis Mother      Thyroid Disease Mother      Diabetes Mother      Glaucoma No family hx of      Macular Degeneration No family hx of        REVIEW OF SYSTEMS:  Constitutional:  No weight loss, fever, chills, weakness or   fatigue.  HEENT:  Eyes:  No visual loss, blurred vision, double vision or   yellow sclerae. No hearing loss, sneezing, congestion, runny nose   or sore throat.  Skin:  No rash or itching.  Cardiovascular: per HPI  Respiratory: per HPI  GI:  No anorexia, nausea, vomiting or diarrhea. No abdominal pain   or blood.  :  No dysurea, hematuria  Neurologic:  No headache, dizziness, syncope, paralysis, ataxia,   numbness or tingling in the extremities. No change in bowel or   bladder control.  Musculoskeletal:  No muscle, back pain, joint pain or stiffness.  Hematologic:  No anemia, bleeding or bruising.  Lymphatics:  No enlarged nodes. No history of splenectomy.  Psychiatric:  No history of depression or anxiety.  Endocrine:  No reports of sweating, cold or heat intolerance. No   polyuria or polydipsia.  Allergies:  No history of asthma, hives, eczema or rhinitis.    PHYSICAL EXAM:  Temp: 98.2  F (36.8  C) Temp src: Oral BP: (!) 159/83 Pulse: 73     Resp: 18 SpO2: 98 % O2 Device: None (Room air)    Vital Signs with Ranges  Temp:  [97.4  F (36.3  C)-98.2  F (36.8  C)] 98.2  F (36.8  C)  Pulse:  [69-86] 73  Resp:  [18] 18  BP: (110-159)/(68-83) 159/83  SpO2:  [96 %-99 %]  98 %  117 lbs 0 oz    Constitutional: awake, alert, no distress  Eyes: PERRL, sclera nonicteric  ENT: trachea midline  Respiratory: Lungs clear  Cardiovascular: Regular rate, totally irregular rhythm, no   murmurs  GI: nondistended, nontender, bowel sounds present  Lymph/Hematologic: no lymphadenopathy  Skin: Left great toe has callus on the lateral side, very minimal   erythema  Musculoskeletal: good muscle tone, strength 5/5 in upper and   lower extremities  Neurologic: no focal deficits  Neuropsychiatric: appropriate affact    DATA:  Labs: Potassium 3.4, creatinine 1.2, LFTs normal, NT proBNP 1800,   troponin 19, 21, hemoglobin 11, platelets 212, Covid and   influenza negative  Recent Labs   Lab Test 21  0426 20  1028 16  0849 02/02/15  1016 13  0710   CHOL 79 107   < > 99 103   HDL 36* 45   < > 43 30*   LDL 33 53   < > 42 57   TRIG 52 44   < > 68 82   CHOLHDLRATIO  --   --   --  2.3 3.5    < > = values in this interval not displayed.     EKG: : A. fib, rate 92, right bundle branch block,   anteroseptal Q wave, left axis deviation.  No significant changes   from 2021.    Tele: A. fib, rate 80    Echo: Pending           Echocardiogram Complete     Status: None   Result Value Ref Range    Biplane LVEF 41%     Narrative    [id number]  [id number]  [id number]  [id number]^MARTIN^KAI^ARABELLA     [facility]  Echocardiography Laboratory  [address] [place] [address]     Name: PORTER YANCEY  MRN: [id number]  : 1950  Study Date: 2021 07:38 AM  Age: 71 yrs  Gender: Male  Patient Location: [facility]  Reason For Study: Chest Pain  Ordering Physician: KAI SALAZAR  Performed By: Olga Allison     BSA: 2.3 m2  Height: 76 in  Weight: 217 lb  HR: 74  BP: 159/83 mmHg  ______________________________________________________________________________  Procedure  Complete Portable Echo Adult. [employer] (NDC #3559-6257) given  intravenously.  ______________________________________________________________________________  Interpretation Summary     The left ventricle is normal in size.  There is mild concentric left ventricular hypertrophy.  Biplane LVEF is 41%.  The apex and mid to distal septum are severely hypokinetic.  There is moderate global hypokinesia of the left ventricle.  Diastolic Doppler findings (E/E' ratio and/or other parameters) suggest left  ventricular filling pressures are increased.  The right ventricle is normal size.  Moderately decreased right ventricular systolic function  There is mild (1+) tricuspid regurgitation.  Severe (>55mmHg) pulmonary hypertension is present.  Compared to the last echo of 4/2021 the EF is similar but estimated right  heart pressures are higher.     The study was technically difficult. Contrast was used without apparent  complications.  ______________________________________________________________________________  Left Ventricle  The left ventricle is normal in size. There is mild concentric left  ventricular hypertrophy. Diastolic Doppler findings (E/E' ratio and/or other  parameters) suggest left ventricular filling pressures are increased. Biplane  LVEF is 41%. There is moderate global hypokinesia of the left ventricle. The  apex and mid to distal septum are severely hypokinetic.     Right Ventricle  The right ventricle is normal size. Moderately decreased right ventricular  systolic function.     Atria  Normal left atrial size. Right atrial size is normal. There is no atrial shunt  seen.     Mitral Valve  The mitral valve leaflets appear normal. There is no evidence of stenosis,  fluttering, or prolapse. There is trace mitral regurgitation.     Tricuspid Valve  Normal tricuspid valve. There is mild (1+) tricuspid regurgitation. The right  ventricular systolic pressure is approximated at 44mmHg plus the right atrial  pressure. Severe (>55mmHg) pulmonary hypertension is present. IVC  diameter  >2.1 cm collapsing <50% with sniff suggests a high RA pressure estimated at 15  mmHg or greater.     Aortic Valve  There is mild trileaflet aortic sclerosis. There is trace aortic  regurgitation.     Pulmonic Valve  There is mild (1+) pulmonic valvular regurgitation.     Vessels  Normal size aorta.     Pericardium  There is no pericardial effusion.     Rhythm  The rhythm was atrial fibrillation.  ______________________________________________________________________________  MMode/2D Measurements & Calculations     IVSd: 1.5 cm  LVIDd: 4.7 cm  LVIDs: 3.8 cm  LVPWd: 1.3 cm  FS: 19.4 %  LV mass(C)d: 271.5 grams  LV mass(C)dI: 118.4 grams/m2  Ao root diam: 3.2 cm  LA dimension: 5.4 cm  asc Aorta Diam: 3.7 cm  LA/Ao: 1.7  LVOT diam: 2.3 cm  LVOT area: 4.2 cm2  LA Volume (BP): 70.6 ml  LA Volume Index (BP): 30.8 ml/m2  RWT: 0.54     Doppler Measurements & Calculations  MV E max john: 100.7 cm/sec  MV dec time: 0.15 sec  AI P1/2t: 562.4 msec  LV V1 max PG: 3.5 mmHg  LV V1 max: 94.0 cm/sec  LV V1 VTI: 20.0 cm  SV(LVOT): 83.1 ml  SI(LVOT): 36.2 ml/m2  PA acc time: 0.10 sec  PI end-d john: 191.0 cm/sec  TR max john: 304.2 cm/sec  TR max P.7 mmHg  E/E' av.6  Lateral E/e': 22.2  Medial E/e': 23.1     ______________________________________________________________________________  Report approved by: Hank Salmeron 2021 10:05 AM         Cardiac Catheterization     Status: None   Result Value Ref Range    Cath EF Estimated 35 %    Narrative      The ejection fraction is 30-40% by visual estimate.    Left ventricular filling pressures are mildly elevated.    Prox LAD lesion is 20% stenosed.    Mid LAD lesion is 65% stenosed.    Prox Cx lesion is 10% stenosed.    Mid Cx lesion is 20% stenosed.    Prox RCA lesion is 50% stenosed.    Mid RCA lesion is 35% stenosed.    Dist RCA lesion is 10% stenosed.    RV Branch lesion is 100% stenosed.     1. Mild/mod ISR of Lad system stents with preserved iFR 0.91,  no change   from cath 4/2021-REC ongoing medical therapy  2. Stents to OM and RCA only mild narrowing with normal iFR to RCA  3. Mild elevated LVEDP with reduced EF 35-40% with prior MI  4. Note: pt on plavix and eliquis with progressive anemia. Also note pt   was on eliquis yesterday so should be watch for hematoma at cath site   today and consider anemia workup as outpt for this pt on chronic   anticoagulants and slowly progressive anemia. Consider addn of low dose   diuretic with elevated LVEDP     CBC with platelets differential     Status: Abnormal    Narrative    The following orders were created for panel order CBC with platelets differential.  Procedure                               Abnormality         Status                     ---------                               -----------         ------                     CBC with platelets and d...[[id number]]  Abnormal            Final result                 Please view results for these tests on the individual orders.   Extra Tube ([place] Draw)     Status: None    Narrative    The following orders were created for panel order Extra Tube ([place] Draw).  Procedure                               Abnormality         Status                     ---------                               -----------         ------                     Extra Blue Top Tube[[id number]]                              Final result               Extra Green Top (Lithium...[[id number]]                      Final result                 Please view results for these tests on the individual orders.   CBC with Platelets & Differential     Status: Abnormal    Narrative    The following orders were created for panel order CBC with Platelets & Differential.  Procedure                               Abnormality         Status                     ---------                               -----------         ------                     CBC with platelets and d...[[id number]]  Abnormal            Final result                  Please view results for these tests on the individual orders.     Abiola Bernard PA-C

## 2021-12-29 NOTE — PROGRESS NOTES
PRIMARY DIAGNOSIS: CHEST PAIN  OUTPATIENT/OBSERVATION GOALS TO BE MET BEFORE DISCHARGE:  1. Ruled out acute coronary syndrome (negative or stable Troponin):  Yes  2. Pain Status: Denies pain but does report pressure. Pain reproduceable with palpating chest  3. Appropriate provocative testing performed: Yes. Echo and Angiogram done 12/28/21  - Stress Test Procedure: Angiogram 12/28/21  - Interpretation of cardiac rhythm per telemetry tech: Afib CVR w/ BBB     4. Cleared by Consultants (if applicable): Cardiology following  5. Return to near baseline physical activity: Yes  Discharge Planner Nurse   Safe discharge environment identified: Yes  Barriers to discharge: No           Please review provider order for any additional goals.   Nurse to notify provider when observation goals have been met and patient is ready for discharge.  Pt alert and oriented; denies chest pain but complains of chest discomfort/pressure. Mild redness in Lt first toe unchanged in appearance from yesterday. Discoloration and slight swelling noted on femoral site this morning. No tenderness. Soft to touch. Continues on telemetry. Pt  stand by assist X1

## 2021-12-29 NOTE — PROGRESS NOTES
Cardiology Progress Note  Jackelin PETERS, CNP          Assessment and Plan:     Admit (12/27/21) with 3-4 days exertional chest pain, shortness of breath, weakness    PMH:  Coronary artery disease, cardiomyopathy, atrial fibrillation, type II diabetes, hypertension, hyperlipidemia, multiple CVAs, sleep apnea, pulmonary hypertension, osteomyelitis with toe amputations, stage III  CKD    Chest Pain  -NL troponin / no significant ST abnormalities, RBBB  -ECHO: LVEF 41% with apical and distal septal hypokinesis (unchanged)  -hx of prior MI and stenting to LAD, RCA, and OM3 (total of 10 stents)  -s/p cath (12/28/21) showing patent LAD, RCA, and OM3 stents with mild in-stent restenosis  No PCI  -Imdur increased to 60 mg  -okay to discharge from a CV standpoint  Has follow up with Vazquez Christensen NP (1/7/21) at 13:30  Will add on BMP    Ischemic Cardiomyopathy:  -BNP: 1790 / no evidence of CHF per CXR  -LVEF 40%, moderate RV dysfunction, and RVSP > 55 mmHg  - LVEDp 19 mmHg  -weight up 6 lbs from admit, but down 10 lbs in 2 months   -no I & Os recorded  -will increase Lasix from 60 mg/day to 80 mg/day    Possible Cellulitis of Toe  -on IV antibiotics  -previous toe amputations due to osteomyelitis    Severe Pulmonary Hypertension  -pressures increased to >55 mmHg with mild RV dysfunction  -likely related to left heart disease and untreated sleep apnea  -intolerant to CPAP, recommend outpt sleep evaluation  -will increase lasix to 80 mg    Anemia:  -hgb down to 9.7 today   -on Plavix for prior CVA and Eliquis for PAF  No ASA  -work up per hospitalist  -has never had a colonoscopy    Persistent Atrial Fibrillation:  -HRs controlled on Coreg  -pauses up to 2.2 seconds during sleep  -chronic Eliquis  (CHADS2-Vasc score: 7)    Type II Diabetes  -on insulin   -Hgb A1C 8.5%    Hyperlipidemia  -Atorvastatin 40 mg  -LDL 33               Interval History:     No CP this am  Denies palpitations or shortness of breath                 "Medications:       aspirin  81 mg Oral Daily     atorvastatin  40 mg Oral QPM     carvedilol  25 mg Oral BID w/meals     ceFAZolin  2 g Intravenous Q8H     clopidogrel  75 mg Oral Daily     furosemide  20 mg Oral Daily     furosemide  40 mg Oral Daily     gabapentin  600 mg Oral At Bedtime     insulin aspart  12 Units Subcutaneous BID w/meals     insulin aspart  14 Units Subcutaneous Daily with supper     insulin glargine  44 Units Subcutaneous QAM AC     isosorbide mononitrate  60 mg Oral Daily     levothyroxine  137 mcg Oral Daily     lisinopril  2.5 mg Oral Daily     pantoprazole  40 mg Oral QAM AC            Physical Exam:   Blood pressure 139/77, pulse 79, temperature 98.7  F (37.1  C), temperature source Oral, resp. rate 14, height 1.93 m (6' 4\"), weight 117.8 kg (259 lb 12.8 oz), SpO2 95 %.  Wt Readings from Last 3 Encounters:   12/29/21 117.8 kg (259 lb 12.8 oz)   10/13/21 122.9 kg (271 lb)   08/18/21 122.9 kg (271 lb)     No intake/output data recorded.    CONST:  Alert and oriented  NAD  LUNGS:  CTA bilaterlaly  CARDIO:  Irreg Irreg  S1, S2  No murmurs  ABD:  Soft  +BS  EXT:  Left foot/ankle swelling with redness  No right ankle edema           Data:   TELE:  A-fib   HRs 80 bpm    CBC  Recent Labs   Lab 12/29/21  0624 12/28/21  0710   WBC 5.4 5.9   HGB 9.7* 9.9*    175       BMP  Recent Labs   Lab 12/29/21  0208 12/28/21  2151 12/28/21  1758 12/28/21  1413 12/28/21  0849 12/28/21  0710 12/28/21  0146 12/28/21  0035 12/27/21  2141 12/27/21  1530   NA  --   --   --   --   --  136  --   --   --  135   POTASSIUM  --   --   --   --   --  3.1*  --  3.4  --  3.2*   CHLORIDE  --   --   --   --   --  101  --   --   --  100   BETTIE  --   --   --   --   --  8.7  --   --   --  8.3*   CO2  --   --   --   --   --  29  --   --   --  29   BUN  --   --   --   --   --  19  --   --   --  21   CR  --   --   --   --   --  1.12  --   --   --  1.22   * 215* 203* 160*   < > 186*   < >  --    < > 212*    < > = values " in this interval not displayed.     Recent Labs   Lab Test 04/08/21  0426 08/13/20  1028 06/30/16  0849 02/02/15  1016 11/14/13  0710   CHOL 79 107   < > 99 103   HDL 36* 45   < > 43 30*   LDL 33 53   < > 42 57   TRIG 52 44   < > 68 82   CHOLHDLRATIO  --   --   --  2.3 3.5    < > = values in this interval not displayed.       TROP  Lab Results   Component Value Date    TROPI <0.015 04/08/2021    TROPI <0.015 04/07/2021    TROPI <0.015 04/07/2021    TROPI <0.015 01/04/2021    TROPI <0.015 01/03/2021    TROPONIN <0.07 07/26/2005    TROPONIN <0.07 07/25/2005    TROPONIN <0.07 07/25/2005    TROPONIN 0.71 () 07/10/2005    TROPONIN 0.94 () 07/10/2005       BNP  Recent Labs   Lab 12/27/21  1530   NTBNPI 1,797*

## 2021-12-29 NOTE — PLAN OF CARE
PRIMARY DIAGNOSIS: Ccellulitis  OUTPATIENT/OBSERVATION GOALS TO BE MET BEFORE DISCHARGE:  ADLs back to baseline: yes    Activity and level of assistance: Up with standby assistance.    Pain status: Improved-controlled with oral pain medications.    Return to near baseline physical activity: Yes     Discharge Planner Nurse   Safe discharge environment identified: Yes  Barriers to discharge: No       Entered by: Rao Yates 12/29/2021 12:12 AM   Patient had angio this am with no stents, patient VSS, denies pain, alert and oriented, denies any acute needs will cont to monitor   Please review provider order for any additional goals.   Nurse to notify provider when observation goals have been met and patient is ready for discharge.

## 2021-12-29 NOTE — PROGRESS NOTES
PRIMARY DIAGNOSIS: CHEST PAIN  OUTPATIENT/OBSERVATION GOALS TO BE MET BEFORE DISCHARGE:  1. Ruled out acute coronary syndrome (negative or stable Troponin):  Yes  2. Pain Status: Pain free. Denies chest pain but has discomfort   3. Appropriate provocative testing performed: Yes  - Stress Test Procedure: Echo  - Interpretation of cardiac rhythm per telemetry tech: A.fib controlled with pauses (2.1 seconds max)     4. Cleared by Consultants (if applicable):No-cards following   5. Return to near baseline physical activity: Yes     Discharge Planner Nurse      Safe discharge environment identified: Yes  Barriers to discharge: Yes       Entered by: Evangelina Felipe 12/28/2021 8:23 PM        Please review provider order for any additional goals.   Nurse to notify provider when observation goals have been met and patient is ready for discharge.    Denies chest pain but chest pressure present. Alert and oriented. SL. Cards following- pt had angiogram with femoral approach today. Bandaid site c/d/i. Cath lab noticed hematoma after procedure but none witnessed on arrival to floor. Femoral site soft but slightly swollen. CMS intact. On tele-a.fib with pauses (longest pause for this writer is 2.1 seconds so far and pt asymptomatic). Up SBA. Lives with wife baseline.

## 2021-12-29 NOTE — PROGRESS NOTES
PRIMARY DIAGNOSIS: CHEST PAIN  OUTPATIENT/OBSERVATION GOALS TO BE MET BEFORE DISCHARGE:  1. Ruled out acute coronary syndrome (negative or stable Troponin):  Yes  2. Pain Status: Pain free. Denies chest pain but has discomfort   3. Appropriate provocative testing performed: Yes  - Stress Test Procedure: Echo  - Interpretation of cardiac rhythm per telemetry tech: A.fib controlled      4. Cleared by Consultants (if applicable):No-cards following   5. Return to near baseline physical activity: Yes     Discharge Planner Nurse      Safe discharge environment identified: Yes  Barriers to discharge: Yes       Entered by: Evangelina Felipe 12/28/2021 8:23 PM        Please review provider order for any additional goals.   Nurse to notify provider when observation goals have been met and patient is ready for discharge.     Patient rates pain 1/10 after prn nitroglycerin given. Was 2-3/10 chest pain prior with pain radiating into bilateral neck. Cards following- at cath lab now. Was on heparin drip. On tele-a.fib controlled. Up SBA. Lives with wife baseline. Left leg swollen more than right. Left leg slightly pink but no obvious cellulitis noted. Scabs on left toes. On IV ancef for cellulitis. NPO for procedure. Currently has NS running at 150.

## 2021-12-29 NOTE — PLAN OF CARE
PRIMARY DIAGNOSIS:Cellulitis/CP  OUTPATIENT/OBSERVATION GOALS TO BE MET BEFORE DISCHARGE:  ADLs back to baseline: Yes    Activity and level of assistance: Up with standby assistance.    Pain status: Pain free.    Return to near baseline physical activity: Yes     Discharge Planner Nurse   Safe discharge environment identified: Yes  Barriers to discharge: No       Entered by: Rao Yates 12/29/2021 5:13 AM   Patient VSS, has no acute needs through NOC, on abx for LLE cellulitis, had Angio for CP.  Groin site C/D/I will monitor.   Please review provider order for any additional goals.   Nurse to notify provider when observation goals have been met and patient is ready for discharge.

## 2021-12-29 NOTE — PROGRESS NOTES
PRIMARY DIAGNOSIS: CHEST PAIN  OUTPATIENT/OBSERVATION GOALS TO BE MET BEFORE DISCHARGE:  1. Ruled out acute coronary syndrome (negative or stable Troponin):  Yes  2. Pain Status: Pain free. Denies chest pain but has discomfort   3. Appropriate provocative testing performed: Yes  - Stress Test Procedure: Echo  - Interpretation of cardiac rhythm per telemetry tech: A.fib controlled with pauses (2.2 seconds max)    4. Cleared by Consultants (if applicable):No-cards following   5. Return to near baseline physical activity: Yes  Discharge Planner Nurse   Safe discharge environment identified: Yes  Barriers to discharge: Yes       Entered by: Evangelina Felipe 12/28/2021 8:23 PM     Please review provider order for any additional goals.   Nurse to notify provider when observation goals have been met and patient is ready for discharge.  Denies chest pain but chest pressure present. Alert and oriented. SL. Cards following- pt had angiogram with femoral approach today. Bandaid site c/d/i. Cath lab noticed concern for hematoma after procedure but none witnessed on arrival to floor. Femoral site soft but slightly swollen. CMS intact. On tele-a.fib with pauses (longest pause for this writer is 2.2 seconds and pt asymptomatic). Up SBA. Lives with wife baseline. On IV ancef for cellulitis.

## 2021-12-29 NOTE — PROGRESS NOTES
PRIMARY DIAGNOSIS: CHEST PAIN  OUTPATIENT/OBSERVATION GOALS TO BE MET BEFORE DISCHARGE:  1. Ruled out acute coronary syndrome (negative or stable Troponin):  Yes  2. Pain Status: Pain free. Denies chest pain but has discomfort   3. Appropriate provocative testing performed: Yes  - Stress Test Procedure: Echo  - Interpretation of cardiac rhythm per telemetry tech: A.fib controlled      4. Cleared by Consultants (if applicable):No-cards following   5. Return to near baseline physical activity: Yes     Discharge Planner Nurse      Safe discharge environment identified: Yes  Barriers to discharge: Yes       Entered by: Evangelina Felipe 12/28/2021 8:23 PM        Please review provider order for any additional goals.   Nurse to notify provider when observation goals have been met and patient is ready for discharge.     Patient rates pain 1/10 after prn nitroglycerin given. Was 2-3/10 chest pain prior with pain radiating into bilateral neck. Cards following- plan to go to cath lab today. Was on heparin drip. On tele-a.fib controlled. Up SBA. Lives with wife baseline. Left leg swollen more than right. Left leg slightly pink but no obvious cellulitis noted. Scabs on left toes. On IV ancef for cellulitis. NPO for procedure.

## 2021-12-30 ENCOUNTER — TELEPHONE (OUTPATIENT)
Dept: CARDIOLOGY | Facility: CLINIC | Age: 71
End: 2021-12-30
Payer: COMMERCIAL

## 2021-12-30 ENCOUNTER — PATIENT OUTREACH (OUTPATIENT)
Dept: CARE COORDINATION | Facility: CLINIC | Age: 71
End: 2021-12-30
Payer: COMMERCIAL

## 2021-12-30 DIAGNOSIS — Z71.89 OTHER SPECIFIED COUNSELING: ICD-10-CM

## 2021-12-30 NOTE — TELEPHONE ENCOUNTER
Called pt - post hospitalization for chest pain and hx of CAD.  Pt underwent cor angio - no change since 04/2021  Pt continues on clopidogrel - aware to not stop without calling cardiologist to discuss  Elevated pressures - increased furosemide  Imdur also increased  Pt states his heart cath insertion site, groin, is dry and intact.  No hematoma he states.  Hx of long term anticoagulation and anemia.  Dr. Crockett recommends work up for etiology.  Pt will see Vickie EARL 01/07/2022 in [facility] clinic  Call with any questions.  No further questions at this time

## 2021-12-30 NOTE — PROGRESS NOTES
Clinic Care Coordination Contact  [facility]: Post-Discharge Note  SITUATION                                                      Admission:    Admission Date: 12/27/21   Reason for Admission: chest pain, and symptoms concerning for unstable angina.  Discharge:   Discharge Date: 12/29/21  Discharge Diagnosis: Cellulitis, Lightheadedness, Chest pain    BACKGROUND                                                         Jayro Elder is a 71 year old male who has a past medical history significant for diabetes mellitus type 2, coronary artery disease, hypertension, hyperlipidemia, congestive heart failure, previous stroke, peripheral neuropathy, and sleep apnea.  He has been having chest pain for the past 3 to 4 days.  Pain is located in the left side of his chest.  Radiates to his neck.  Has also been feeling weak.  He gets short of breath with the pain.  Pain happens when he is doing any activities.  Pain will get better when he sits and rests.  He has not noticed a cough.  No fevers or chills.  Pain does feel somewhat similar to previous times when he has required cardiac stents.  Pain is better after medications given in the ER.  He has been on his feet quite a bit over the past week getting ready for the holidays.  He has noted a mild ache in his left foot.  Not much pain in this area.  He does not have a lot of sensation in his lower extremities due to neuropathy.  Has difficulty with his vision.  Has not been able to see his toes well because of his chronic vision problems.  No change in his vision recently.  No other acute complaints.    ASSESSMENT      Enrollment  Primary Care Care Coordination Status: Not a Candidate    Discharge Assessment  How are you doing now that you are home?: better sometimes, other times not so great  How are your symptoms? (Red Flag symptoms escalate to triage hotline per guidelines): Improved (foot still stings)  Do you feel your condition is stable enough to be safe at  home until your provider visit?: Yes  Does the patient have their discharge instructions? : Yes  Does the patient have questions regarding their discharge instructions? : No  Were you started on any new medications or were there changes to any of your previous medications? : Yes  Does the patient have all of their medications?: Yes  Do you have questions regarding any of your medications? : No  Do you have all of your needed medical supplies or equipment (DME)?  (i.e. oxygen tank, CPAP, cane, etc.): Yes  Discharge follow-up appointment scheduled within 14 calendar days? : Yes        PLAN                                                      Outpatient Plan:  Follow-up and recommended labs and tests  Follow up with primary care provider within 7 days for hospital follow- up. The following labs/tests are recommended:  Hgb, should monitor closely and if remains lower or drops further would consider further anemia workup including  colonoscopy.  Keep scheduled follow up with cardiology [facility] on 1/7/21.  Follow up with Dr. Mckeon of podiatry in the next 3-4 weeks to reassess calluses and pressure wounds of bilateral feet.    Future Appointments   Date Time Provider Department Center   1/7/2022  1:30 PM [facility]   1/7/2022  2:15 PM Татьяна Mckeon DPM, Podiatry/Foot and Ankle Surgery [facility]         For any urgent concerns, please contact our 24 hour nurse triage line: [phone] ([phone])         Shena Cani MA

## 2022-01-01 ENCOUNTER — DOCUMENTATION ONLY (OUTPATIENT)
Dept: SLEEP MEDICINE | Facility: CLINIC | Age: 72
End: 2022-01-01

## 2022-01-01 ENCOUNTER — TELEPHONE (OUTPATIENT)
Dept: SLEEP MEDICINE | Facility: CLINIC | Age: 72
End: 2022-01-01
Payer: COMMERCIAL

## 2022-01-01 ENCOUNTER — THERAPY VISIT (OUTPATIENT)
Dept: SLEEP MEDICINE | Facility: CLINIC | Age: 72
End: 2022-01-01
Attending: PHYSICIAN ASSISTANT
Payer: COMMERCIAL

## 2022-01-01 ENCOUNTER — LAB REQUISITION (OUTPATIENT)
Dept: LAB | Facility: CLINIC | Age: 72
End: 2022-01-01
Payer: COMMERCIAL

## 2022-01-01 ENCOUNTER — OFFICE VISIT (OUTPATIENT)
Dept: PODIATRY | Facility: CLINIC | Age: 72
End: 2022-01-01
Payer: COMMERCIAL

## 2022-01-01 ENCOUNTER — TELEPHONE (OUTPATIENT)
Dept: PODIATRY | Facility: CLINIC | Age: 72
End: 2022-01-01
Payer: COMMERCIAL

## 2022-01-01 ENCOUNTER — TELEPHONE (OUTPATIENT)
Dept: GERIATRICS | Facility: CLINIC | Age: 72
End: 2022-01-01

## 2022-01-01 ENCOUNTER — OFFICE VISIT (OUTPATIENT)
Dept: FAMILY MEDICINE | Facility: CLINIC | Age: 72
End: 2022-01-01
Payer: COMMERCIAL

## 2022-01-01 ENCOUNTER — TELEPHONE (OUTPATIENT)
Dept: CARDIOLOGY | Facility: CLINIC | Age: 72
End: 2022-01-01

## 2022-01-01 ENCOUNTER — TRANSITIONAL CARE UNIT VISIT (OUTPATIENT)
Dept: GERIATRICS | Facility: CLINIC | Age: 72
End: 2022-01-01
Payer: COMMERCIAL

## 2022-01-01 ENCOUNTER — OFFICE VISIT (OUTPATIENT)
Dept: CARDIOLOGY | Facility: CLINIC | Age: 72
End: 2022-01-01
Attending: NURSE PRACTITIONER
Payer: COMMERCIAL

## 2022-01-01 ENCOUNTER — TELEPHONE (OUTPATIENT)
Dept: PODIATRY | Facility: CLINIC | Age: 72
End: 2022-01-01

## 2022-01-01 ENCOUNTER — APPOINTMENT (OUTPATIENT)
Dept: GENERAL RADIOLOGY | Facility: CLINIC | Age: 72
End: 2022-01-01
Attending: PODIATRIST
Payer: COMMERCIAL

## 2022-01-01 ENCOUNTER — DISCHARGE SUMMARY NURSING HOME (OUTPATIENT)
Dept: GERIATRICS | Facility: CLINIC | Age: 72
End: 2022-01-01
Payer: COMMERCIAL

## 2022-01-01 ENCOUNTER — TRANSFERRED RECORDS (OUTPATIENT)
Dept: HEALTH INFORMATION MANAGEMENT | Facility: CLINIC | Age: 72
End: 2022-01-01
Payer: COMMERCIAL

## 2022-01-01 ENCOUNTER — TELEPHONE (OUTPATIENT)
Dept: FAMILY MEDICINE | Facility: CLINIC | Age: 72
End: 2022-01-01
Payer: COMMERCIAL

## 2022-01-01 ENCOUNTER — VIRTUAL VISIT (OUTPATIENT)
Dept: SLEEP MEDICINE | Facility: CLINIC | Age: 72
End: 2022-01-01
Attending: NURSE PRACTITIONER
Payer: COMMERCIAL

## 2022-01-01 ENCOUNTER — HOSPITAL ENCOUNTER (OUTPATIENT)
Facility: CLINIC | Age: 72
Discharge: HOME | End: 2022-02-14
Attending: PODIATRIST | Admitting: PODIATRIST
Payer: COMMERCIAL

## 2022-01-01 ENCOUNTER — TELEPHONE (OUTPATIENT)
Dept: FAMILY MEDICINE | Facility: CLINIC | Age: 72
End: 2022-01-01

## 2022-01-01 ENCOUNTER — HOSPITAL ENCOUNTER (OUTPATIENT)
Dept: MRI IMAGING | Facility: CLINIC | Age: 72
Discharge: HOME | End: 2022-01-29
Attending: PODIATRIST | Admitting: PODIATRIST
Payer: COMMERCIAL

## 2022-01-01 ENCOUNTER — LAB (OUTPATIENT)
Dept: LAB | Facility: CLINIC | Age: 72
End: 2022-01-01
Payer: COMMERCIAL

## 2022-01-01 ENCOUNTER — ANESTHESIA (OUTPATIENT)
Dept: SURGERY | Facility: CLINIC | Age: 72
End: 2022-01-01
Payer: COMMERCIAL

## 2022-01-01 ENCOUNTER — ANESTHESIA EVENT (OUTPATIENT)
Dept: SURGERY | Facility: CLINIC | Age: 72
End: 2022-01-01
Payer: COMMERCIAL

## 2022-01-01 ENCOUNTER — TRANSFERRED RECORDS (OUTPATIENT)
Dept: FAMILY MEDICINE | Facility: CLINIC | Age: 72
End: 2022-01-01

## 2022-01-01 ENCOUNTER — TELEPHONE (OUTPATIENT)
Dept: CARDIOLOGY | Facility: CLINIC | Age: 72
End: 2022-01-01
Payer: COMMERCIAL

## 2022-01-01 VITALS
WEIGHT: 255 LBS | OXYGEN SATURATION: 97 % | BODY MASS INDEX: 31.71 KG/M2 | HEIGHT: 75 IN | SYSTOLIC BLOOD PRESSURE: 138 MMHG | HEART RATE: 75 BPM | DIASTOLIC BLOOD PRESSURE: 74 MMHG | TEMPERATURE: 97.9 F | RESPIRATION RATE: 18 BRPM

## 2022-01-01 VITALS
HEART RATE: 97 BPM | WEIGHT: 239 LBS | BODY MASS INDEX: 29.1 KG/M2 | SYSTOLIC BLOOD PRESSURE: 103 MMHG | HEIGHT: 76 IN | DIASTOLIC BLOOD PRESSURE: 67 MMHG

## 2022-01-01 VITALS
SYSTOLIC BLOOD PRESSURE: 98 MMHG | HEART RATE: 72 BPM | WEIGHT: 256.2 LBS | HEIGHT: 76 IN | DIASTOLIC BLOOD PRESSURE: 64 MMHG | OXYGEN SATURATION: 99 % | BODY MASS INDEX: 31.2 KG/M2

## 2022-01-01 VITALS
HEART RATE: 64 BPM | DIASTOLIC BLOOD PRESSURE: 66 MMHG | HEIGHT: 75 IN | WEIGHT: 233 LBS | RESPIRATION RATE: 17 BRPM | SYSTOLIC BLOOD PRESSURE: 121 MMHG | TEMPERATURE: 98 F | BODY MASS INDEX: 28.97 KG/M2 | OXYGEN SATURATION: 97 %

## 2022-01-01 VITALS
TEMPERATURE: 98.6 F | OXYGEN SATURATION: 95 % | SYSTOLIC BLOOD PRESSURE: 146 MMHG | RESPIRATION RATE: 16 BRPM | HEIGHT: 76 IN | WEIGHT: 256 LBS | DIASTOLIC BLOOD PRESSURE: 88 MMHG | HEART RATE: 78 BPM | BODY MASS INDEX: 31.17 KG/M2

## 2022-01-01 VITALS
OXYGEN SATURATION: 96 % | BODY MASS INDEX: 29.02 KG/M2 | SYSTOLIC BLOOD PRESSURE: 143 MMHG | HEART RATE: 69 BPM | RESPIRATION RATE: 17 BRPM | WEIGHT: 233.4 LBS | HEIGHT: 75 IN | DIASTOLIC BLOOD PRESSURE: 77 MMHG | TEMPERATURE: 98.2 F

## 2022-01-01 VITALS
SYSTOLIC BLOOD PRESSURE: 144 MMHG | DIASTOLIC BLOOD PRESSURE: 84 MMHG | WEIGHT: 265.5 LBS | HEART RATE: 72 BPM | HEIGHT: 76 IN | RESPIRATION RATE: 18 BRPM | BODY MASS INDEX: 32.33 KG/M2 | TEMPERATURE: 97.9 F | OXYGEN SATURATION: 99 %

## 2022-01-01 VITALS
HEIGHT: 75 IN | HEIGHT: 76 IN | SYSTOLIC BLOOD PRESSURE: 98 MMHG | WEIGHT: 253 LBS | DIASTOLIC BLOOD PRESSURE: 60 MMHG | SYSTOLIC BLOOD PRESSURE: 120 MMHG | BODY MASS INDEX: 32.27 KG/M2 | RESPIRATION RATE: 20 BRPM | HEART RATE: 77 BPM | BODY MASS INDEX: 31.46 KG/M2 | WEIGHT: 265 LBS | DIASTOLIC BLOOD PRESSURE: 58 MMHG | TEMPERATURE: 97.9 F

## 2022-01-01 VITALS
BODY MASS INDEX: 32.16 KG/M2 | WEIGHT: 258.7 LBS | RESPIRATION RATE: 16 BRPM | SYSTOLIC BLOOD PRESSURE: 100 MMHG | HEIGHT: 75 IN | HEART RATE: 78 BPM | DIASTOLIC BLOOD PRESSURE: 54 MMHG | TEMPERATURE: 97.8 F | OXYGEN SATURATION: 99 %

## 2022-01-01 VITALS
BODY MASS INDEX: 31.76 KG/M2 | DIASTOLIC BLOOD PRESSURE: 74 MMHG | HEART RATE: 71 BPM | RESPIRATION RATE: 16 BRPM | SYSTOLIC BLOOD PRESSURE: 132 MMHG | WEIGHT: 255.4 LBS | HEIGHT: 75 IN | OXYGEN SATURATION: 100 % | TEMPERATURE: 98 F

## 2022-01-01 VITALS — HEIGHT: 76 IN | WEIGHT: 265 LBS | BODY MASS INDEX: 32.27 KG/M2

## 2022-01-01 VITALS
BODY MASS INDEX: 29.1 KG/M2 | HEIGHT: 76 IN | SYSTOLIC BLOOD PRESSURE: 118 MMHG | WEIGHT: 239 LBS | DIASTOLIC BLOOD PRESSURE: 72 MMHG

## 2022-01-01 VITALS
BODY MASS INDEX: 32.27 KG/M2 | SYSTOLIC BLOOD PRESSURE: 140 MMHG | WEIGHT: 265 LBS | DIASTOLIC BLOOD PRESSURE: 78 MMHG | HEIGHT: 76 IN

## 2022-01-01 VITALS
BODY MASS INDEX: 32.27 KG/M2 | HEIGHT: 76 IN | SYSTOLIC BLOOD PRESSURE: 126 MMHG | WEIGHT: 265 LBS | DIASTOLIC BLOOD PRESSURE: 64 MMHG

## 2022-01-01 VITALS
DIASTOLIC BLOOD PRESSURE: 76 MMHG | HEIGHT: 75 IN | BODY MASS INDEX: 33.1 KG/M2 | WEIGHT: 266.2 LBS | HEART RATE: 80 BPM | SYSTOLIC BLOOD PRESSURE: 124 MMHG | OXYGEN SATURATION: 98 %

## 2022-01-01 VITALS
SYSTOLIC BLOOD PRESSURE: 130 MMHG | BODY MASS INDEX: 31.17 KG/M2 | DIASTOLIC BLOOD PRESSURE: 84 MMHG | WEIGHT: 256 LBS | HEIGHT: 76 IN

## 2022-01-01 DIAGNOSIS — L97.422 DIABETIC ULCER OF LEFT MIDFOOT ASSOCIATED WITH TYPE 2 DIABETES MELLITUS, WITH FAT LAYER EXPOSED (H): ICD-10-CM

## 2022-01-01 DIAGNOSIS — N18.2 STAGE 2 CHRONIC KIDNEY DISEASE: ICD-10-CM

## 2022-01-01 DIAGNOSIS — I10 ESSENTIAL HYPERTENSION: ICD-10-CM

## 2022-01-01 DIAGNOSIS — I50.22 CHRONIC SYSTOLIC CONGESTIVE HEART FAILURE (H): Primary | ICD-10-CM

## 2022-01-01 DIAGNOSIS — J13 PNEUMONIA OF BOTH LUNGS DUE TO STREPTOCOCCUS PNEUMONIAE, UNSPECIFIED PART OF LUNG (H): Primary | ICD-10-CM

## 2022-01-01 DIAGNOSIS — I27.20 SEVERE PULMONARY HYPERTENSION (H): ICD-10-CM

## 2022-01-01 DIAGNOSIS — I48.20 CHRONIC ATRIAL FIBRILLATION (H): ICD-10-CM

## 2022-01-01 DIAGNOSIS — L84 PRE-ULCERATIVE CALLUSES: ICD-10-CM

## 2022-01-01 DIAGNOSIS — I50.9 HEART FAILURE, UNSPECIFIED (H): ICD-10-CM

## 2022-01-01 DIAGNOSIS — I63.432 CEREBROVASCULAR ACCIDENT (CVA) DUE TO EMBOLISM OF LEFT POSTERIOR CEREBRAL ARTERY (H): ICD-10-CM

## 2022-01-01 DIAGNOSIS — I50.23 ACUTE ON CHRONIC SYSTOLIC CONGESTIVE HEART FAILURE (H): ICD-10-CM

## 2022-01-01 DIAGNOSIS — D64.9 CHRONIC ANEMIA: ICD-10-CM

## 2022-01-01 DIAGNOSIS — E78.5 DYSLIPIDEMIA: ICD-10-CM

## 2022-01-01 DIAGNOSIS — E11.42 DIABETIC POLYNEUROPATHY ASSOCIATED WITH TYPE 2 DIABETES MELLITUS (H): ICD-10-CM

## 2022-01-01 DIAGNOSIS — A41.9 SEPTIC SHOCK (H): ICD-10-CM

## 2022-01-01 DIAGNOSIS — G63 POLYNEUROPATHY ASSOCIATED WITH UNDERLYING DISEASE (H): ICD-10-CM

## 2022-01-01 DIAGNOSIS — I25.5 ISCHEMIC CARDIOMYOPATHY: ICD-10-CM

## 2022-01-01 DIAGNOSIS — R42 VERTIGO: ICD-10-CM

## 2022-01-01 DIAGNOSIS — K21.9 GASTROESOPHAGEAL REFLUX DISEASE: ICD-10-CM

## 2022-01-01 DIAGNOSIS — I24.89 DEMAND ISCHEMIA (H): ICD-10-CM

## 2022-01-01 DIAGNOSIS — S98.112A AMPUTATION OF LEFT GREAT TOE (H): ICD-10-CM

## 2022-01-01 DIAGNOSIS — R53.81 PHYSICAL DECONDITIONING: ICD-10-CM

## 2022-01-01 DIAGNOSIS — R53.1 WEAKNESS: ICD-10-CM

## 2022-01-01 DIAGNOSIS — G47.30 SLEEP APNEA, UNSPECIFIED TYPE: ICD-10-CM

## 2022-01-01 DIAGNOSIS — Z98.890 POST-OPERATIVE STATE: Primary | ICD-10-CM

## 2022-01-01 DIAGNOSIS — M54.41 BILATERAL LOW BACK PAIN WITH BILATERAL SCIATICA, UNSPECIFIED CHRONICITY: Primary | ICD-10-CM

## 2022-01-01 DIAGNOSIS — E11.42 DIABETIC POLYNEUROPATHY ASSOCIATED WITH TYPE 2 DIABETES MELLITUS (H): Primary | ICD-10-CM

## 2022-01-01 DIAGNOSIS — J96.01 ACUTE RESPIRATORY FAILURE WITH HYPOXIA (H): ICD-10-CM

## 2022-01-01 DIAGNOSIS — R65.21 SEPTIC SHOCK (H): ICD-10-CM

## 2022-01-01 DIAGNOSIS — I25.810 CORONARY ARTERY DISEASE INVOLVING AUTOLOGOUS ARTERY CORONARY BYPASS GRAFT WITHOUT ANGINA PECTORIS: ICD-10-CM

## 2022-01-01 DIAGNOSIS — E11.42 TYPE 2 DIABETES MELLITUS WITH DIABETIC POLYNEUROPATHY, WITH LONG-TERM CURRENT USE OF INSULIN (H): ICD-10-CM

## 2022-01-01 DIAGNOSIS — M20.5X2 HALLUX LIMITUS, LEFT: ICD-10-CM

## 2022-01-01 DIAGNOSIS — I50.22 CHRONIC SYSTOLIC CONGESTIVE HEART FAILURE (H): ICD-10-CM

## 2022-01-01 DIAGNOSIS — N18.31 STAGE 3A CHRONIC KIDNEY DISEASE (H): ICD-10-CM

## 2022-01-01 DIAGNOSIS — G47.30 SLEEP APNEA, UNSPECIFIED TYPE: Primary | ICD-10-CM

## 2022-01-01 DIAGNOSIS — M20.5X2 HALLUX LIMITUS OF LEFT FOOT: ICD-10-CM

## 2022-01-01 DIAGNOSIS — L72.3 SEBACEOUS CYST: ICD-10-CM

## 2022-01-01 DIAGNOSIS — Z79.4 TYPE 2 DIABETES MELLITUS WITH DIABETIC POLYNEUROPATHY, WITH LONG-TERM CURRENT USE OF INSULIN (H): ICD-10-CM

## 2022-01-01 DIAGNOSIS — I50.22 CHRONIC SYSTOLIC (CONGESTIVE) HEART FAILURE (H): ICD-10-CM

## 2022-01-01 DIAGNOSIS — N18.2 CHRONIC KIDNEY DISEASE, STAGE 2 (MILD): ICD-10-CM

## 2022-01-01 DIAGNOSIS — H93.13 TINNITUS, BILATERAL: Primary | ICD-10-CM

## 2022-01-01 DIAGNOSIS — Z87.39 HISTORY OF OSTEOMYELITIS: ICD-10-CM

## 2022-01-01 DIAGNOSIS — G47.33 OSA (OBSTRUCTIVE SLEEP APNEA): Primary | ICD-10-CM

## 2022-01-01 DIAGNOSIS — G47.33 OSA (OBSTRUCTIVE SLEEP APNEA): ICD-10-CM

## 2022-01-01 DIAGNOSIS — R91.8 PULMONARY NODULES: ICD-10-CM

## 2022-01-01 DIAGNOSIS — E11.21 TYPE 2 DIABETES MELLITUS WITH DIABETIC NEPHROPATHY, WITH LONG-TERM CURRENT USE OF INSULIN (H): ICD-10-CM

## 2022-01-01 DIAGNOSIS — F17.200 TOBACCO USE DISORDER: ICD-10-CM

## 2022-01-01 DIAGNOSIS — L97.524 ULCER OF LEFT FOOT, WITH NECROSIS OF BONE (H): ICD-10-CM

## 2022-01-01 DIAGNOSIS — K21.9 GASTROESOPHAGEAL REFLUX DISEASE, UNSPECIFIED WHETHER ESOPHAGITIS PRESENT: ICD-10-CM

## 2022-01-01 DIAGNOSIS — I50.23 ACUTE ON CHRONIC SYSTOLIC CONGESTIVE HEART FAILURE (H): Primary | ICD-10-CM

## 2022-01-01 DIAGNOSIS — K72.00 ACUTE AND SUBACUTE HEPATIC FAILURE WITHOUT COMA: ICD-10-CM

## 2022-01-01 DIAGNOSIS — E03.9 HYPOTHYROIDISM, UNSPECIFIED TYPE: ICD-10-CM

## 2022-01-01 DIAGNOSIS — I63.89 CEREBROVASCULAR ACCIDENT (CVA) DUE TO OTHER MECHANISM (H): ICD-10-CM

## 2022-01-01 DIAGNOSIS — K72.00 SHOCK LIVER: ICD-10-CM

## 2022-01-01 DIAGNOSIS — M54.42 BILATERAL LOW BACK PAIN WITH BILATERAL SCIATICA, UNSPECIFIED CHRONICITY: Primary | ICD-10-CM

## 2022-01-01 DIAGNOSIS — R06.02 SOB (SHORTNESS OF BREATH): ICD-10-CM

## 2022-01-01 DIAGNOSIS — L97.522 ULCER OF LEFT FOOT WITH FAT LAYER EXPOSED (H): ICD-10-CM

## 2022-01-01 DIAGNOSIS — G25.81 RESTLESS LEGS SYNDROME: ICD-10-CM

## 2022-01-01 DIAGNOSIS — Z11.1 ENCOUNTER FOR SCREENING FOR RESPIRATORY TUBERCULOSIS: ICD-10-CM

## 2022-01-01 DIAGNOSIS — I20.0 UNSTABLE ANGINA (H): ICD-10-CM

## 2022-01-01 DIAGNOSIS — E11.621 DIABETIC ULCER OF LEFT MIDFOOT ASSOCIATED WITH TYPE 2 DIABETES MELLITUS, WITH FAT LAYER EXPOSED (H): ICD-10-CM

## 2022-01-01 DIAGNOSIS — D64.9 ANEMIA, UNSPECIFIED TYPE: ICD-10-CM

## 2022-01-01 DIAGNOSIS — I48.21 PERMANENT ATRIAL FIBRILLATION (H): ICD-10-CM

## 2022-01-01 DIAGNOSIS — I25.10 CORONARY ARTERY DISEASE INVOLVING NATIVE CORONARY ARTERY OF NATIVE HEART WITHOUT ANGINA PECTORIS: ICD-10-CM

## 2022-01-01 DIAGNOSIS — Z89.422 STATUS POST AMPUTATION OF LESSER TOE OF LEFT FOOT (H): ICD-10-CM

## 2022-01-01 DIAGNOSIS — H61.21 IMPACTED CERUMEN OF RIGHT EAR: ICD-10-CM

## 2022-01-01 DIAGNOSIS — N18.9 CHRONIC KIDNEY DISEASE, UNSPECIFIED: ICD-10-CM

## 2022-01-01 DIAGNOSIS — N18.32 STAGE 3B CHRONIC KIDNEY DISEASE (H): ICD-10-CM

## 2022-01-01 DIAGNOSIS — Z79.4 TYPE 2 DIABETES MELLITUS WITH DIABETIC NEPHROPATHY, WITH LONG-TERM CURRENT USE OF INSULIN (H): ICD-10-CM

## 2022-01-01 DIAGNOSIS — E11.51 TYPE 2 DIABETES MELLITUS WITH DIABETIC PERIPHERAL ANGIOPATHY WITHOUT GANGRENE, WITH LONG-TERM CURRENT USE OF INSULIN (H): ICD-10-CM

## 2022-01-01 DIAGNOSIS — N17.9 ACUTE KIDNEY INJURY (H): ICD-10-CM

## 2022-01-01 DIAGNOSIS — I25.119 CORONARY ARTERY DISEASE INVOLVING NATIVE CORONARY ARTERY OF NATIVE HEART WITH ANGINA PECTORIS (H): ICD-10-CM

## 2022-01-01 DIAGNOSIS — Z79.4 TYPE 2 DIABETES MELLITUS WITH DIABETIC PERIPHERAL ANGIOPATHY WITHOUT GANGRENE, WITH LONG-TERM CURRENT USE OF INSULIN (H): ICD-10-CM

## 2022-01-01 DIAGNOSIS — M86.9 OSTEOMYELITIS OF GREAT TOE OF LEFT FOOT (H): ICD-10-CM

## 2022-01-01 DIAGNOSIS — M86.9 OSTEOMYELITIS OF LEFT FOOT, UNSPECIFIED TYPE (H): ICD-10-CM

## 2022-01-01 DIAGNOSIS — Z01.818 PREOP GENERAL PHYSICAL EXAM: Primary | ICD-10-CM

## 2022-01-01 DIAGNOSIS — L97.522 SKIN ULCER OF LEFT GREAT TOE WITH FAT LAYER EXPOSED (H): ICD-10-CM

## 2022-01-01 DIAGNOSIS — U07.1 INFECTION DUE TO 2019 NOVEL CORONAVIRUS: ICD-10-CM

## 2022-01-01 DIAGNOSIS — E03.9 HYPOTHYROIDISM, UNSPECIFIED TYPE: Primary | ICD-10-CM

## 2022-01-01 DIAGNOSIS — D64.9 ANEMIA, UNSPECIFIED: ICD-10-CM

## 2022-01-01 DIAGNOSIS — E11.51 TYPE 2 DIABETES MELLITUS WITH DIABETIC PERIPHERAL ANGIOPATHY WITHOUT GANGRENE, UNSPECIFIED WHETHER LONG TERM INSULIN USE (H): ICD-10-CM

## 2022-01-01 LAB
ALBUMIN SERPL-MCNC: 3.3 G/DL (ref 3.5–5)
ALP SERPL-CCNC: 131 U/L (ref 45–120)
ALT SERPL W P-5'-P-CCNC: 81 U/L (ref 0–45)
ANION GAP SERPL CALCULATED.3IONS-SCNC: 10 MMOL/L (ref 7–15)
ANION GAP SERPL CALCULATED.3IONS-SCNC: 12 MMOL/L (ref 5–18)
ANION GAP SERPL CALCULATED.3IONS-SCNC: 5 MMOL/L (ref 3–14)
AST SERPL W P-5'-P-CCNC: 34 U/L (ref 0–40)
ATRIAL RATE - MUSE: 68 BPM
BACTERIA TISS BX CULT: NO GROWTH
BACTERIA TISS BX CULT: NORMAL
BILIRUB DIRECT SERPL-MCNC: 0.7 MG/DL
BILIRUB SERPL-MCNC: 1.9 MG/DL (ref 0–1)
BUN SERPL-MCNC: 16 MG/DL (ref 8–28)
BUN SERPL-MCNC: 17 MG/DL (ref 7–30)
BUN SERPL-MCNC: 18.9 MG/DL (ref 8–23)
CALCIUM SERPL-MCNC: 8.5 MG/DL (ref 8.5–10.1)
CALCIUM SERPL-MCNC: 9.3 MG/DL (ref 8.8–10.2)
CALCIUM SERPL-MCNC: 9.8 MG/DL (ref 8.5–10.5)
CHLORIDE BLD-SCNC: 101 MMOL/L (ref 98–107)
CHLORIDE BLD-SCNC: 106 MMOL/L (ref 94–109)
CHLORIDE SERPL-SCNC: 102 MMOL/L (ref 98–107)
CO2 SERPL-SCNC: 26 MMOL/L (ref 20–32)
CO2 SERPL-SCNC: 26 MMOL/L (ref 22–31)
CREAT SERPL-MCNC: 1.04 MG/DL (ref 0.7–1.3)
CREAT SERPL-MCNC: 1.08 MG/DL (ref 0.67–1.17)
CREAT SERPL-MCNC: 1.1 MG/DL (ref 0.66–1.25)
DEPRECATED HCO3 PLAS-SCNC: 27 MMOL/L (ref 22–29)
DIASTOLIC BLOOD PRESSURE - MUSE: NORMAL MMHG
ERYTHROCYTE [DISTWIDTH] IN BLOOD BY AUTOMATED COUNT: 15.2 % (ref 10–15)
ERYTHROCYTE [DISTWIDTH] IN BLOOD BY AUTOMATED COUNT: 15.5 % (ref 10–15)
GAMMA INTERFERON BACKGROUND BLD IA-ACNC: 0 IU/ML
GAMMA INTERFERON BACKGROUND BLD IA-ACNC: 0.03 IU/ML
GFR SERPL CREATININE-BSD FRML MDRD: 72 ML/MIN/1.73M2
GFR SERPL CREATININE-BSD FRML MDRD: 73 ML/MIN/1.73M2
GFR SERPL CREATININE-BSD FRML MDRD: 77 ML/MIN/1.73M2
GLUCOSE BLD-MCNC: 196 MG/DL (ref 70–99)
GLUCOSE BLD-MCNC: 87 MG/DL (ref 70–125)
GLUCOSE BLDC GLUCOMTR-MCNC: 142 MG/DL (ref 70–99)
GLUCOSE BLDC GLUCOMTR-MCNC: 157 MG/DL (ref 70–99)
GLUCOSE SERPL-MCNC: 132 MG/DL (ref 70–99)
GRAM STAIN RESULT: NORMAL
GRAM STAIN RESULT: NORMAL
HCT VFR BLD AUTO: 32 % (ref 40–53)
HCT VFR BLD AUTO: 33.4 % (ref 40–53)
HGB BLD-MCNC: 10 G/DL (ref 13.3–17.7)
HGB BLD-MCNC: 10.4 G/DL (ref 13.3–17.7)
INTERPRETATION ECG - MUSE: NORMAL
M TB IFN-G BLD-IMP: NEGATIVE
M TB IFN-G BLD-IMP: NEGATIVE
M TB IFN-G CD4+ BCKGRND COR BLD-ACNC: 10 IU/ML
M TB IFN-G CD4+ BCKGRND COR BLD-ACNC: 9.97 IU/ML
MCH RBC QN AUTO: 28.3 PG (ref 26.5–33)
MCH RBC QN AUTO: 28.7 PG (ref 26.5–33)
MCHC RBC AUTO-ENTMCNC: 31.1 G/DL (ref 31.5–36.5)
MCHC RBC AUTO-ENTMCNC: 31.3 G/DL (ref 31.5–36.5)
MCV RBC AUTO: 91 FL (ref 78–100)
MCV RBC AUTO: 92 FL (ref 78–100)
MITOGEN IGNF BCKGRD COR BLD-ACNC: 0 IU/ML
MITOGEN IGNF BCKGRD COR BLD-ACNC: 0.01 IU/ML
MITOGEN IGNF BCKGRD COR BLD-ACNC: 0.01 IU/ML
MITOGEN IGNF BCKGRD COR BLD-ACNC: 0.02 IU/ML
NT-PROBNP SERPL-MCNC: 1588 PG/ML (ref 0–125)
P AXIS - MUSE: NORMAL DEGREES
PATH REPORT.COMMENTS IMP SPEC: NORMAL
PATH REPORT.COMMENTS IMP SPEC: NORMAL
PATH REPORT.FINAL DX SPEC: NORMAL
PATH REPORT.GROSS SPEC: NORMAL
PATH REPORT.MICROSCOPIC SPEC OTHER STN: NORMAL
PATH REPORT.RELEVANT HX SPEC: NORMAL
PHOTO IMAGE: NORMAL
PLATELET # BLD AUTO: 155 10E3/UL (ref 150–450)
PLATELET # BLD AUTO: 273 10E3/UL (ref 150–450)
POTASSIUM BLD-SCNC: 3.9 MMOL/L (ref 3.5–5)
POTASSIUM BLD-SCNC: 4 MMOL/L (ref 3.4–5.3)
POTASSIUM SERPL-SCNC: 4 MMOL/L (ref 3.4–5.3)
PR INTERVAL - MUSE: NORMAL MS
PROT SERPL-MCNC: 6.6 G/DL (ref 6–8)
QRS DURATION - MUSE: 120 MS
QT - MUSE: 432 MS
QTC - MUSE: 492 MS
QUANTIFERON MITOGEN: 10 IU/ML
QUANTIFERON MITOGEN: 10 IU/ML
QUANTIFERON NIL TUBE: 0 IU/ML
QUANTIFERON NIL TUBE: 0.03 IU/ML
QUANTIFERON TB1 TUBE: 0.01 IU/ML
QUANTIFERON TB1 TUBE: 0.05 IU/ML
QUANTIFERON TB2 TUBE: 0.01
QUANTIFERON TB2 TUBE: 0.03
R AXIS - MUSE: -55 DEGREES
RBC # BLD AUTO: 3.49 10E6/UL (ref 4.4–5.9)
RBC # BLD AUTO: 3.67 10E6/UL (ref 4.4–5.9)
SLPCOMP: NORMAL
SODIUM SERPL-SCNC: 137 MMOL/L (ref 133–144)
SODIUM SERPL-SCNC: 139 MMOL/L (ref 136–145)
SODIUM SERPL-SCNC: 139 MMOL/L (ref 136–145)
SYSTOLIC BLOOD PRESSURE - MUSE: NORMAL MMHG
T AXIS - MUSE: -52 DEGREES
VENTRICULAR RATE- MUSE: 78 BPM
WBC # BLD AUTO: 3.5 10E3/UL (ref 4–11)
WBC # BLD AUTO: 3.6 10E3/UL (ref 4–11)

## 2022-01-01 PROCEDURE — 69209 REMOVE IMPACTED EAR WAX UNI: CPT | Mod: RT | Performed by: PHYSICIAN ASSISTANT

## 2022-01-01 PROCEDURE — 36415 COLL VENOUS BLD VENIPUNCTURE: CPT | Performed by: INTERNAL MEDICINE

## 2022-01-01 PROCEDURE — 88305 TISSUE EXAM BY PATHOLOGIST: CPT | Mod: TC | Performed by: PODIATRIST

## 2022-01-01 PROCEDURE — P9603 ONE-WAY ALLOW PRORATED MILES: HCPCS | Performed by: INTERNAL MEDICINE

## 2022-01-01 PROCEDURE — 250N000011 HC RX IP 250 OP 636: Performed by: NURSE ANESTHETIST, CERTIFIED REGISTERED

## 2022-01-01 PROCEDURE — 87075 CULTR BACTERIA EXCEPT BLOOD: CPT | Performed by: PODIATRIST

## 2022-01-01 PROCEDURE — 99214 OFFICE O/P EST MOD 30 MIN: CPT | Performed by: INTERNAL MEDICINE

## 2022-01-01 PROCEDURE — P9604 ONE-WAY ALLOW PRORATED TRIP: HCPCS | Performed by: PHYSICIAN ASSISTANT

## 2022-01-01 PROCEDURE — 82310 ASSAY OF CALCIUM: CPT | Performed by: PHYSICIAN ASSISTANT

## 2022-01-01 PROCEDURE — 99213 OFFICE O/P EST LOW 20 MIN: CPT | Performed by: PODIATRIST

## 2022-01-01 PROCEDURE — 85027 COMPLETE CBC AUTOMATED: CPT | Performed by: INTERNAL MEDICINE

## 2022-01-01 PROCEDURE — 88311 DECALCIFY TISSUE: CPT | Mod: TC | Performed by: PODIATRIST

## 2022-01-01 PROCEDURE — 73720 MRI LWR EXTREMITY W/O&W/DYE: CPT | Mod: 26 | Performed by: RADIOLOGY

## 2022-01-01 PROCEDURE — 250N000011 HC RX IP 250 OP 636: Performed by: PODIATRIST

## 2022-01-01 PROCEDURE — 99316 NF DSCHRG MGMT 30 MIN+: CPT | Performed by: INTERNAL MEDICINE

## 2022-01-01 PROCEDURE — 360N000076 HC SURGERY LEVEL 3, PER MIN: Performed by: PODIATRIST

## 2022-01-01 PROCEDURE — 11042 DBRDMT SUBQ TIS 1ST 20SQCM/<: CPT | Performed by: PODIATRIST

## 2022-01-01 PROCEDURE — 99309 SBSQ NF CARE MODERATE MDM 30: CPT | Performed by: PHYSICIAN ASSISTANT

## 2022-01-01 PROCEDURE — 99213 OFFICE O/P EST LOW 20 MIN: CPT | Mod: 25 | Performed by: PHYSICIAN ASSISTANT

## 2022-01-01 PROCEDURE — 36415 COLL VENOUS BLD VENIPUNCTURE: CPT | Performed by: NURSE PRACTITIONER

## 2022-01-01 PROCEDURE — 99213 OFFICE O/P EST LOW 20 MIN: CPT | Mod: 25 | Performed by: PODIATRIST

## 2022-01-01 PROCEDURE — 88305 TISSUE EXAM BY PATHOLOGIST: CPT | Mod: 26

## 2022-01-01 PROCEDURE — 87070 CULTURE OTHR SPECIMN AEROBIC: CPT | Performed by: PODIATRIST

## 2022-01-01 PROCEDURE — 258N000003 HC RX IP 258 OP 636: Performed by: ANESTHESIOLOGY

## 2022-01-01 PROCEDURE — 272N000001 HC OR GENERAL SUPPLY STERILE: Performed by: PODIATRIST

## 2022-01-01 PROCEDURE — 99214 OFFICE O/P EST MOD 30 MIN: CPT | Mod: 25 | Performed by: PODIATRIST

## 2022-01-01 PROCEDURE — 28820 AMPUTATION OF TOE: CPT | Mod: TA | Performed by: PODIATRIST

## 2022-01-01 PROCEDURE — P9604 ONE-WAY ALLOW PRORATED TRIP: HCPCS | Performed by: INTERNAL MEDICINE

## 2022-01-01 PROCEDURE — 99212 OFFICE O/P EST SF 10 MIN: CPT | Performed by: PODIATRIST

## 2022-01-01 PROCEDURE — 999N000065 XR FOOT PORT LEFT 2 VIEWS: Mod: LT

## 2022-01-01 PROCEDURE — 86481 TB AG RESPONSE T-CELL SUSP: CPT | Performed by: INTERNAL MEDICINE

## 2022-01-01 PROCEDURE — 82947 ASSAY GLUCOSE BLOOD QUANT: CPT | Performed by: INTERNAL MEDICINE

## 2022-01-01 PROCEDURE — 95810 POLYSOM 6/> YRS 4/> PARAM: CPT | Performed by: INTERNAL MEDICINE

## 2022-01-01 PROCEDURE — 82310 ASSAY OF CALCIUM: CPT | Performed by: INTERNAL MEDICINE

## 2022-01-01 PROCEDURE — 82248 BILIRUBIN DIRECT: CPT | Performed by: INTERNAL MEDICINE

## 2022-01-01 PROCEDURE — 370N000017 HC ANESTHESIA TECHNICAL FEE, PER MIN: Performed by: PODIATRIST

## 2022-01-01 PROCEDURE — 85014 HEMATOCRIT: CPT | Performed by: PHYSICIAN ASSISTANT

## 2022-01-01 PROCEDURE — 99316 NF DSCHRG MGMT 30 MIN+: CPT | Performed by: PHYSICIAN ASSISTANT

## 2022-01-01 PROCEDURE — 36415 COLL VENOUS BLD VENIPUNCTURE: CPT | Performed by: PHYSICIAN ASSISTANT

## 2022-01-01 PROCEDURE — 82962 GLUCOSE BLOOD TEST: CPT | Mod: 91

## 2022-01-01 PROCEDURE — 710N000012 HC RECOVERY PHASE 2, PER MINUTE: Performed by: PODIATRIST

## 2022-01-01 PROCEDURE — 73720 MRI LWR EXTREMITY W/O&W/DYE: CPT | Mod: LT

## 2022-01-01 PROCEDURE — 99215 OFFICE O/P EST HI 40 MIN: CPT | Performed by: PHYSICIAN ASSISTANT

## 2022-01-01 PROCEDURE — 87205 SMEAR GRAM STAIN: CPT | Performed by: PODIATRIST

## 2022-01-01 PROCEDURE — 999N000141 HC STATISTIC PRE-PROCEDURE NURSING ASSESSMENT: Performed by: PODIATRIST

## 2022-01-01 PROCEDURE — 255N000002 HC RX 255 OP 636: Performed by: PODIATRIST

## 2022-01-01 PROCEDURE — 80048 BASIC METABOLIC PNL TOTAL CA: CPT | Performed by: NURSE PRACTITIONER

## 2022-01-01 PROCEDURE — 99443 PR PHYSICIAN TELEPHONE EVALUATION 21-30 MIN: CPT | Mod: 95 | Performed by: PHYSICIAN ASSISTANT

## 2022-01-01 PROCEDURE — 99306 1ST NF CARE HIGH MDM 50: CPT | Performed by: INTERNAL MEDICINE

## 2022-01-01 PROCEDURE — A9585 GADOBUTROL INJECTION: HCPCS | Performed by: PODIATRIST

## 2022-01-01 PROCEDURE — 83880 ASSAY OF NATRIURETIC PEPTIDE: CPT | Performed by: NURSE PRACTITIONER

## 2022-01-01 PROCEDURE — 99215 OFFICE O/P EST HI 40 MIN: CPT | Performed by: NURSE PRACTITIONER

## 2022-01-01 RX ORDER — CARVEDILOL 12.5 MG/1
12.5 TABLET ORAL 2 TIMES DAILY WITH MEALS
COMMUNITY

## 2022-01-01 RX ORDER — PANTOPRAZOLE SODIUM 40 MG/1
TABLET, DELAYED RELEASE ORAL
Qty: 90 TABLET | Refills: 1 | OUTPATIENT
Start: 2022-01-01

## 2022-01-01 RX ORDER — INSULIN ASPART 100 [IU]/ML
INJECTION, SOLUTION INTRAVENOUS; SUBCUTANEOUS
COMMUNITY

## 2022-01-01 RX ORDER — LEVOTHYROXINE SODIUM 137 UG/1
137 TABLET ORAL DAILY
Qty: 30 TABLET | Refills: 0 | Status: SHIPPED | OUTPATIENT
Start: 2022-01-01

## 2022-01-01 RX ORDER — FUROSEMIDE 20 MG
20 TABLET ORAL DAILY
COMMUNITY

## 2022-01-01 RX ORDER — FUROSEMIDE 40 MG
40 TABLET ORAL 2 TIMES DAILY
Qty: 60 TABLET | Refills: 0 | Status: SHIPPED | OUTPATIENT
Start: 2022-01-01 | End: 2022-01-01

## 2022-01-01 RX ORDER — FENTANYL CITRATE 50 UG/ML
25 INJECTION, SOLUTION INTRAMUSCULAR; INTRAVENOUS EVERY 5 MIN PRN
Status: DISCONTINUED | OUTPATIENT
Start: 2022-01-01 | End: 2022-01-01 | Stop reason: HOSPADM

## 2022-01-01 RX ORDER — CARVEDILOL 6.25 MG/1
6.25 TABLET ORAL 2 TIMES DAILY WITH MEALS
COMMUNITY
End: 2022-01-01

## 2022-01-01 RX ORDER — MECLIZINE HYDROCHLORIDE 25 MG/1
25 TABLET ORAL 3 TIMES DAILY PRN
Qty: 30 TABLET | Refills: 0 | Status: SHIPPED | OUTPATIENT
Start: 2022-01-01 | End: 2022-01-01

## 2022-01-01 RX ORDER — NALOXONE HYDROCHLORIDE 0.4 MG/ML
0.2 INJECTION, SOLUTION INTRAMUSCULAR; INTRAVENOUS; SUBCUTANEOUS
Status: DISCONTINUED | OUTPATIENT
Start: 2022-01-01 | End: 2022-01-01 | Stop reason: HOSPADM

## 2022-01-01 RX ORDER — INSULIN LISPRO 100 [IU]/ML
INJECTION, SOLUTION INTRAVENOUS; SUBCUTANEOUS
COMMUNITY
End: 2022-01-01

## 2022-01-01 RX ORDER — ONDANSETRON 4 MG/1
4 TABLET, ORALLY DISINTEGRATING ORAL EVERY 30 MIN PRN
Status: DISCONTINUED | OUTPATIENT
Start: 2022-01-01 | End: 2022-01-01 | Stop reason: HOSPADM

## 2022-01-01 RX ORDER — LISINOPRIL 10 MG/1
10 TABLET ORAL DAILY
COMMUNITY

## 2022-01-01 RX ORDER — ATORVASTATIN CALCIUM 40 MG/1
40 TABLET, FILM COATED ORAL EVERY EVENING
Qty: 90 TABLET | Refills: 2 | Status: SHIPPED | OUTPATIENT
Start: 2022-01-01

## 2022-01-01 RX ORDER — LISINOPRIL 10 MG/1
10 TABLET ORAL DAILY
Qty: 30 TABLET | Refills: 0 | Status: SHIPPED | OUTPATIENT
Start: 2022-01-01 | End: 2022-01-01

## 2022-01-01 RX ORDER — FUROSEMIDE 20 MG
20 TABLET ORAL 2 TIMES DAILY
COMMUNITY
End: 2022-01-01

## 2022-01-01 RX ORDER — HYDROMORPHONE HCL IN WATER/PF 6 MG/30 ML
0.2 PATIENT CONTROLLED ANALGESIA SYRINGE INTRAVENOUS EVERY 5 MIN PRN
Status: DISCONTINUED | OUTPATIENT
Start: 2022-01-01 | End: 2022-01-01 | Stop reason: HOSPADM

## 2022-01-01 RX ORDER — CARVEDILOL 25 MG/1
25 TABLET ORAL 2 TIMES DAILY WITH MEALS
Qty: 180 TABLET | Refills: 3 | Status: SHIPPED | OUTPATIENT
Start: 2022-01-01 | End: 2022-01-01

## 2022-01-01 RX ORDER — ISOSORBIDE MONONITRATE 30 MG/1
30 TABLET, EXTENDED RELEASE ORAL DAILY
Qty: 90 TABLET | Refills: 0 | Status: SHIPPED | OUTPATIENT
Start: 2022-01-01

## 2022-01-01 RX ORDER — SODIUM CHLORIDE, SODIUM LACTATE, POTASSIUM CHLORIDE, CALCIUM CHLORIDE 600; 310; 30; 20 MG/100ML; MG/100ML; MG/100ML; MG/100ML
INJECTION, SOLUTION INTRAVENOUS CONTINUOUS
Status: DISCONTINUED | OUTPATIENT
Start: 2022-01-01 | End: 2022-01-01 | Stop reason: HOSPADM

## 2022-01-01 RX ORDER — GABAPENTIN 300 MG/1
300 CAPSULE ORAL DAILY
COMMUNITY
End: 2022-01-01

## 2022-01-01 RX ORDER — DIGOXIN 125 MCG
125 TABLET ORAL DAILY
COMMUNITY

## 2022-01-01 RX ORDER — MEPERIDINE HYDROCHLORIDE 25 MG/ML
12.5 INJECTION INTRAMUSCULAR; INTRAVENOUS; SUBCUTANEOUS
Status: DISCONTINUED | OUTPATIENT
Start: 2022-01-01 | End: 2022-01-01 | Stop reason: HOSPADM

## 2022-01-01 RX ORDER — PROPOFOL 10 MG/ML
INJECTION, EMULSION INTRAVENOUS CONTINUOUS PRN
Status: DISCONTINUED | OUTPATIENT
Start: 2022-01-01 | End: 2022-01-01

## 2022-01-01 RX ORDER — HYDROCODONE BITARTRATE AND ACETAMINOPHEN 5; 325 MG/1; MG/1
1 TABLET ORAL
Status: DISCONTINUED | OUTPATIENT
Start: 2022-01-01 | End: 2022-01-01 | Stop reason: HOSPADM

## 2022-01-01 RX ORDER — CLOPIDOGREL BISULFATE 75 MG/1
75 TABLET ORAL DAILY
Qty: 30 TABLET | Refills: 0 | Status: SHIPPED | OUTPATIENT
Start: 2022-01-01 | End: 2023-01-01

## 2022-01-01 RX ORDER — PANTOPRAZOLE SODIUM 40 MG/1
TABLET, DELAYED RELEASE ORAL
Qty: 90 TABLET | Refills: 1 | Status: SHIPPED | OUTPATIENT
Start: 2022-01-01

## 2022-01-01 RX ORDER — CEFAZOLIN SODIUM/WATER 2 G/20 ML
2 SYRINGE (ML) INTRAVENOUS
Status: COMPLETED | OUTPATIENT
Start: 2022-01-01 | End: 2022-01-01

## 2022-01-01 RX ORDER — LISINOPRIL 10 MG/1
10 TABLET ORAL DAILY
COMMUNITY
End: 2022-01-01

## 2022-01-01 RX ORDER — ONDANSETRON 2 MG/ML
4 INJECTION INTRAMUSCULAR; INTRAVENOUS EVERY 30 MIN PRN
Status: DISCONTINUED | OUTPATIENT
Start: 2022-01-01 | End: 2022-01-01 | Stop reason: HOSPADM

## 2022-01-01 RX ORDER — SENNOSIDES 8.6 MG
2 TABLET ORAL 2 TIMES DAILY
COMMUNITY

## 2022-01-01 RX ORDER — KETOCONAZOLE 20 MG/G
CREAM TOPICAL 2 TIMES DAILY
COMMUNITY

## 2022-01-01 RX ORDER — FUROSEMIDE 40 MG
40 TABLET ORAL DAILY
Qty: 90 TABLET | Refills: 3 | Status: SHIPPED | OUTPATIENT
Start: 2022-01-01 | End: 2022-01-01

## 2022-01-01 RX ORDER — CEPHALEXIN 500 MG/1
500 CAPSULE ORAL 2 TIMES DAILY
Qty: 20 CAPSULE | Refills: 0 | Status: SHIPPED | OUTPATIENT
Start: 2022-01-01 | End: 2022-01-01

## 2022-01-01 RX ORDER — CARVEDILOL 6.25 MG/1
6.25 TABLET ORAL 2 TIMES DAILY WITH MEALS
Qty: 60 TABLET | Refills: 0 | Status: SHIPPED | OUTPATIENT
Start: 2022-01-01 | End: 2022-01-01

## 2022-01-01 RX ORDER — GADOBUTROL 604.72 MG/ML
0.1 INJECTION INTRAVENOUS ONCE
Status: COMPLETED | OUTPATIENT
Start: 2022-01-01 | End: 2022-01-01

## 2022-01-01 RX ORDER — METHOCARBAMOL 500 MG/1
500 TABLET, FILM COATED ORAL 2 TIMES DAILY PRN
COMMUNITY
End: 2022-01-01

## 2022-01-01 RX ORDER — GABAPENTIN 300 MG/1
300 CAPSULE ORAL DAILY
Qty: 30 CAPSULE | Refills: 0 | Status: SHIPPED | OUTPATIENT
Start: 2022-01-01 | End: 2022-01-01

## 2022-01-01 RX ORDER — ACETAMINOPHEN 500 MG
1000 TABLET ORAL 3 TIMES DAILY
COMMUNITY

## 2022-01-01 RX ORDER — MECLIZINE HYDROCHLORIDE 25 MG/1
25 TABLET ORAL 3 TIMES DAILY PRN
COMMUNITY
End: 2022-01-01

## 2022-01-01 RX ORDER — HYDROCODONE BITARTRATE AND ACETAMINOPHEN 5; 325 MG/1; MG/1
1-2 TABLET ORAL EVERY 4 HOURS PRN
Qty: 15 TABLET | Refills: 0 | Status: SHIPPED | OUTPATIENT
Start: 2022-01-01 | End: 2022-01-01

## 2022-01-01 RX ORDER — ISOSORBIDE MONONITRATE 30 MG/1
30 TABLET, EXTENDED RELEASE ORAL DAILY
Qty: 90 TABLET | Refills: 1 | Status: SHIPPED | OUTPATIENT
Start: 2022-01-01 | End: 2022-01-01

## 2022-01-01 RX ORDER — CEFAZOLIN SODIUM/WATER 2 G/20 ML
2 SYRINGE (ML) INTRAVENOUS SEE ADMIN INSTRUCTIONS
Status: DISCONTINUED | OUTPATIENT
Start: 2022-01-01 | End: 2022-01-01 | Stop reason: HOSPADM

## 2022-01-01 RX ORDER — ATORVASTATIN CALCIUM 40 MG/1
40 TABLET, FILM COATED ORAL EVERY EVENING
Qty: 30 TABLET | Refills: 0 | Status: SHIPPED | OUTPATIENT
Start: 2022-01-01 | End: 2022-01-01

## 2022-01-01 RX ORDER — FENTANYL CITRATE 50 UG/ML
25 INJECTION, SOLUTION INTRAMUSCULAR; INTRAVENOUS
Status: DISCONTINUED | OUTPATIENT
Start: 2022-01-01 | End: 2022-01-01 | Stop reason: HOSPADM

## 2022-01-01 RX ORDER — ISOSORBIDE MONONITRATE 30 MG/1
30 TABLET, EXTENDED RELEASE ORAL DAILY
Qty: 30 TABLET | Refills: 0 | Status: SHIPPED | OUTPATIENT
Start: 2022-01-01 | End: 2022-01-01

## 2022-01-01 RX ORDER — AMOXICILLIN 250 MG
1-2 CAPSULE ORAL 2 TIMES DAILY
Qty: 30 TABLET | Refills: 0 | Status: SHIPPED | OUTPATIENT
Start: 2022-01-01 | End: 2022-01-01

## 2022-01-01 RX ORDER — LIDOCAINE 40 MG/G
CREAM TOPICAL
Status: DISCONTINUED | OUTPATIENT
Start: 2022-01-01 | End: 2022-01-01 | Stop reason: HOSPADM

## 2022-01-01 RX ORDER — GABAPENTIN 300 MG/1
CAPSULE ORAL
Qty: 180 CAPSULE | Refills: 1 | Status: SHIPPED | OUTPATIENT
Start: 2022-01-01 | End: 2022-01-01

## 2022-01-01 RX ORDER — CARVEDILOL 6.25 MG/1
6.25 TABLET ORAL 2 TIMES DAILY WITH MEALS
Qty: 60 TABLET | Refills: 0 | Status: SHIPPED | OUTPATIENT
Start: 2022-01-01 | End: 2022-01-01 | Stop reason: DRUGHIGH

## 2022-01-01 RX ORDER — INSULIN ASPART 100 [IU]/ML
INJECTION, SOLUTION INTRAVENOUS; SUBCUTANEOUS
Qty: 15 ML | Refills: 0 | Status: CANCELLED
Start: 2022-01-01

## 2022-01-01 RX ORDER — NALOXONE HYDROCHLORIDE 0.4 MG/ML
0.4 INJECTION, SOLUTION INTRAMUSCULAR; INTRAVENOUS; SUBCUTANEOUS
Status: DISCONTINUED | OUTPATIENT
Start: 2022-01-01 | End: 2022-01-01 | Stop reason: HOSPADM

## 2022-01-01 RX ORDER — HYDROMORPHONE HYDROCHLORIDE 2 MG/1
2-4 TABLET ORAL EVERY 6 HOURS PRN
COMMUNITY
End: 2022-01-01

## 2022-01-01 RX ORDER — NORTRIPTYLINE HCL 25 MG
25 CAPSULE ORAL AT BEDTIME
COMMUNITY

## 2022-01-01 RX ORDER — ONDANSETRON 4 MG/1
4-8 TABLET, ORALLY DISINTEGRATING ORAL EVERY 8 HOURS PRN
Qty: 4 TABLET | Refills: 0 | Status: SHIPPED | OUTPATIENT
Start: 2022-01-01 | End: 2022-01-01

## 2022-01-01 RX ORDER — GABAPENTIN 300 MG/1
600-900 CAPSULE ORAL 3 TIMES DAILY
COMMUNITY

## 2022-01-01 RX ORDER — OXYCODONE HYDROCHLORIDE 5 MG/1
5 TABLET ORAL EVERY 4 HOURS PRN
Status: DISCONTINUED | OUTPATIENT
Start: 2022-01-01 | End: 2022-01-01 | Stop reason: HOSPADM

## 2022-01-01 RX ORDER — LISINOPRIL 2.5 MG/1
2.5 TABLET ORAL DAILY
Qty: 90 TABLET | Refills: 1 | Status: SHIPPED | OUTPATIENT
Start: 2022-01-01 | End: 2022-01-01

## 2022-01-01 RX ORDER — FUROSEMIDE 20 MG
20 TABLET ORAL 2 TIMES DAILY
Qty: 60 TABLET | Refills: 0 | Status: SHIPPED | OUTPATIENT
Start: 2022-01-01 | End: 2022-01-01

## 2022-01-01 RX ADMIN — MIDAZOLAM 2 MG: 1 INJECTION INTRAMUSCULAR; INTRAVENOUS at 13:11

## 2022-01-01 RX ADMIN — PROPOFOL 120 MCG/KG/MIN: 10 INJECTION, EMULSION INTRAVENOUS at 13:05

## 2022-01-01 RX ADMIN — SODIUM CHLORIDE, POTASSIUM CHLORIDE, SODIUM LACTATE AND CALCIUM CHLORIDE: 600; 310; 30; 20 INJECTION, SOLUTION INTRAVENOUS at 13:02

## 2022-01-01 RX ADMIN — Medication 2 G: at 13:03

## 2022-01-01 RX ADMIN — GADOBUTROL 10 ML: 604.72 INJECTION INTRAVENOUS at 16:32

## 2022-01-01 ASSESSMENT — MIFFLIN-ST. JEOR
SCORE: 2014.09
SCORE: 1940.6
SCORE: 1940.6
SCORE: 2060.8
SCORE: 1988.23
SCORE: 2069.88
SCORE: 2048.11

## 2022-01-01 ASSESSMENT — SLEEP AND FATIGUE QUESTIONNAIRES
HOW LIKELY ARE YOU TO NOD OFF OR FALL ASLEEP WHEN YOU ARE A PASSENGER IN A CAR FOR AN HOUR WITHOUT A BREAK: WOULD NEVER DOZE
HOW LIKELY ARE YOU TO NOD OFF OR FALL ASLEEP WHILE LYING DOWN TO REST IN THE AFTERNOON WHEN CIRCUMSTANCES PERMIT: WOULD NEVER DOZE
HOW LIKELY ARE YOU TO NOD OFF OR FALL ASLEEP WHILE SITTING AND TALKING TO SOMEONE: WOULD NEVER DOZE
HOW LIKELY ARE YOU TO NOD OFF OR FALL ASLEEP WHILE WATCHING TV: MODERATE CHANCE OF DOZING
HOW LIKELY ARE YOU TO NOD OFF OR FALL ASLEEP WHILE SITTING AND READING: MODERATE CHANCE OF DOZING
HOW LIKELY ARE YOU TO NOD OFF OR FALL ASLEEP WHILE SITTING INACTIVE IN A PUBLIC PLACE: WOULD NEVER DOZE
HOW LIKELY ARE YOU TO NOD OFF OR FALL ASLEEP IN A CAR, WHILE STOPPED FOR A FEW MINUTES IN TRAFFIC: WOULD NEVER DOZE
HOW LIKELY ARE YOU TO NOD OFF OR FALL ASLEEP WHILE SITTING QUIETLY AFTER LUNCH WITHOUT ALCOHOL: MODERATE CHANCE OF DOZING

## 2022-01-01 ASSESSMENT — PAIN SCALES - GENERAL: PAINLEVEL: NO PAIN (0)

## 2022-01-03 ENCOUNTER — TELEPHONE (OUTPATIENT)
Dept: PODIATRY | Facility: CLINIC | Age: 72
End: 2022-01-03
Payer: COMMERCIAL

## 2022-01-03 NOTE — TELEPHONE ENCOUNTER
Patient has an appointment scheduled for 1/7/22 with Dr. Mckeon.   He was discharged from [facility] on 12/29/21 for cellulitis of left great toe and put on Cephalexin 500mg 4 times daily x 6 days.     No consent to communicate on file for number listed on chart.   Left voicemail asking for a return call.     ROLANDA Bates RN

## 2022-01-03 NOTE — TELEPHONE ENCOUNTER
[facility] Call Center    Phone Message    May a detailed message be left on voicemail: yes     Reason for Call: Other: Patient toe is still infected has some leakage coming out itis deep red, and like a open sore. States antibotic is not working, please call regarding this.     Action Taken: Other: [facility] podiatry pool RN    Travel Screening: Not Applicable

## 2022-01-04 ENCOUNTER — APPOINTMENT (OUTPATIENT)
Dept: GENERAL RADIOLOGY | Facility: CLINIC | Age: 72
DRG: 638 | End: 2022-01-04
Attending: EMERGENCY MEDICINE
Payer: COMMERCIAL

## 2022-01-04 ENCOUNTER — HOSPITAL ENCOUNTER (INPATIENT)
Facility: CLINIC | Age: 72
LOS: 2 days | Discharge: HOME | DRG: 638 | End: 2022-01-07
Attending: EMERGENCY MEDICINE | Admitting: STUDENT IN AN ORGANIZED HEALTH CARE EDUCATION/TRAINING PROGRAM
Payer: COMMERCIAL

## 2022-01-04 DIAGNOSIS — R11.0 NAUSEA: Primary | ICD-10-CM

## 2022-01-04 DIAGNOSIS — L08.9 LOCAL SKIN INFECTION: ICD-10-CM

## 2022-01-04 DIAGNOSIS — E11.628 DIABETES WITH SKIN COMPLICATION (H): ICD-10-CM

## 2022-01-04 DIAGNOSIS — M86.9 OSTEOMYELITIS, UNSPECIFIED SITE, UNSPECIFIED TYPE (H): ICD-10-CM

## 2022-01-04 DIAGNOSIS — L08.9 TOE INFECTION: ICD-10-CM

## 2022-01-04 DIAGNOSIS — Z20.822 CONTACT WITH AND (SUSPECTED) EXPOSURE TO COVID-19: ICD-10-CM

## 2022-01-04 DIAGNOSIS — E11.69 TYPE 2 DIABETES MELLITUS WITH OTHER SPECIFIED COMPLICATION, UNSPECIFIED WHETHER LONG TERM INSULIN USE (H): ICD-10-CM

## 2022-01-04 LAB
ALBUMIN SERPL-MCNC: 3.2 G/DL (ref 3.4–5)
ALP SERPL-CCNC: 122 U/L (ref 40–150)
ALT SERPL W P-5'-P-CCNC: 30 U/L (ref 0–70)
ANION GAP SERPL CALCULATED.3IONS-SCNC: 3 MMOL/L (ref 3–14)
AST SERPL W P-5'-P-CCNC: 13 U/L (ref 0–45)
BASOPHILS # BLD AUTO: 0 10E3/UL (ref 0–0.2)
BASOPHILS NFR BLD AUTO: 1 %
BILIRUB SERPL-MCNC: 0.9 MG/DL (ref 0.2–1.3)
BUN SERPL-MCNC: 21 MG/DL (ref 7–30)
CALCIUM SERPL-MCNC: 8.8 MG/DL (ref 8.5–10.1)
CHLORIDE BLD-SCNC: 103 MMOL/L (ref 94–109)
CO2 SERPL-SCNC: 32 MMOL/L (ref 20–32)
CREAT SERPL-MCNC: 1.18 MG/DL (ref 0.66–1.25)
CRP SERPL-MCNC: 24 MG/L (ref 0–8)
EOSINOPHIL # BLD AUTO: 0.2 10E3/UL (ref 0–0.7)
EOSINOPHIL NFR BLD AUTO: 4 %
ERYTHROCYTE [DISTWIDTH] IN BLOOD BY AUTOMATED COUNT: 13.9 % (ref 10–15)
GFR SERPL CREATININE-BSD FRML MDRD: 66 ML/MIN/1.73M2
GLUCOSE BLD-MCNC: 144 MG/DL (ref 70–99)
HCT VFR BLD AUTO: 31.7 % (ref 40–53)
HGB BLD-MCNC: 10.3 G/DL (ref 13.3–17.7)
HOLD SPECIMEN: NORMAL
IMM GRANULOCYTES # BLD: 0 10E3/UL
IMM GRANULOCYTES NFR BLD: 0 %
LACTATE SERPL-SCNC: 1.2 MMOL/L (ref 0.7–2)
LYMPHOCYTES # BLD AUTO: 1 10E3/UL (ref 0.8–5.3)
LYMPHOCYTES NFR BLD AUTO: 21 %
MCH RBC QN AUTO: 29.4 PG (ref 26.5–33)
MCHC RBC AUTO-ENTMCNC: 32.5 G/DL (ref 31.5–36.5)
MCV RBC AUTO: 91 FL (ref 78–100)
MONOCYTES # BLD AUTO: 0.5 10E3/UL (ref 0–1.3)
MONOCYTES NFR BLD AUTO: 11 %
NEUTROPHILS # BLD AUTO: 3 10E3/UL (ref 1.6–8.3)
NEUTROPHILS NFR BLD AUTO: 63 %
NRBC # BLD AUTO: 0 10E3/UL
NRBC BLD AUTO-RTO: 0 /100
PLATELET # BLD AUTO: 249 10E3/UL (ref 150–450)
POTASSIUM BLD-SCNC: 3.4 MMOL/L (ref 3.4–5.3)
PROT SERPL-MCNC: 7.6 G/DL (ref 6.8–8.8)
RBC # BLD AUTO: 3.5 10E6/UL (ref 4.4–5.9)
SARS-COV-2 RNA RESP QL NAA+PROBE: NEGATIVE
SODIUM SERPL-SCNC: 138 MMOL/L (ref 133–144)
URATE SERPL-MCNC: 9 MG/DL (ref 3.5–7.2)
WBC # BLD AUTO: 4.8 10E3/UL (ref 4–11)

## 2022-01-04 PROCEDURE — 73660 X-RAY EXAM OF TOE(S): CPT | Mod: 26 | Performed by: RADIOLOGY

## 2022-01-04 PROCEDURE — 87205 SMEAR GRAM STAIN: CPT | Performed by: EMERGENCY MEDICINE

## 2022-01-04 PROCEDURE — 84550 ASSAY OF BLOOD/URIC ACID: CPT | Performed by: EMERGENCY MEDICINE

## 2022-01-04 PROCEDURE — 80053 COMPREHEN METABOLIC PANEL: CPT | Performed by: EMERGENCY MEDICINE

## 2022-01-04 PROCEDURE — 250N000013 HC RX MED GY IP 250 OP 250 PS 637

## 2022-01-04 PROCEDURE — 83605 ASSAY OF LACTIC ACID: CPT | Performed by: EMERGENCY MEDICINE

## 2022-01-04 PROCEDURE — C9803 HOPD COVID-19 SPEC COLLECT: HCPCS | Performed by: EMERGENCY MEDICINE

## 2022-01-04 PROCEDURE — 96366 THER/PROPH/DIAG IV INF ADDON: CPT | Performed by: EMERGENCY MEDICINE

## 2022-01-04 PROCEDURE — 99285 EMERGENCY DEPT VISIT HI MDM: CPT | Performed by: EMERGENCY MEDICINE

## 2022-01-04 PROCEDURE — 36415 COLL VENOUS BLD VENIPUNCTURE: CPT | Performed by: EMERGENCY MEDICINE

## 2022-01-04 PROCEDURE — U0005 INFEC AGEN DETEC AMPLI PROBE: HCPCS | Performed by: EMERGENCY MEDICINE

## 2022-01-04 PROCEDURE — 99285 EMERGENCY DEPT VISIT HI MDM: CPT | Mod: 25 | Performed by: EMERGENCY MEDICINE

## 2022-01-04 PROCEDURE — 73630 X-RAY EXAM OF FOOT: CPT | Mod: LT

## 2022-01-04 PROCEDURE — G0378 HOSPITAL OBSERVATION PER HR: HCPCS

## 2022-01-04 PROCEDURE — 250N000011 HC RX IP 250 OP 636: Performed by: EMERGENCY MEDICINE

## 2022-01-04 PROCEDURE — 96367 TX/PROPH/DG ADDL SEQ IV INF: CPT | Performed by: EMERGENCY MEDICINE

## 2022-01-04 PROCEDURE — 86140 C-REACTIVE PROTEIN: CPT | Performed by: EMERGENCY MEDICINE

## 2022-01-04 PROCEDURE — 87070 CULTURE OTHR SPECIMN AEROBIC: CPT | Performed by: EMERGENCY MEDICINE

## 2022-01-04 PROCEDURE — 99207 PR CDG-MDM COMPONENT: MEETS MODERATE - UP CODED: CPT | Performed by: STUDENT IN AN ORGANIZED HEALTH CARE EDUCATION/TRAINING PROGRAM

## 2022-01-04 PROCEDURE — 99222 1ST HOSP IP/OBS MODERATE 55: CPT | Mod: AI | Performed by: STUDENT IN AN ORGANIZED HEALTH CARE EDUCATION/TRAINING PROGRAM

## 2022-01-04 PROCEDURE — 73660 X-RAY EXAM OF TOE(S): CPT | Mod: LT

## 2022-01-04 PROCEDURE — 73630 X-RAY EXAM OF FOOT: CPT | Mod: 26 | Performed by: RADIOLOGY

## 2022-01-04 PROCEDURE — 96375 TX/PRO/DX INJ NEW DRUG ADDON: CPT | Performed by: EMERGENCY MEDICINE

## 2022-01-04 PROCEDURE — 85025 COMPLETE CBC W/AUTO DIFF WBC: CPT | Performed by: EMERGENCY MEDICINE

## 2022-01-04 PROCEDURE — 96365 THER/PROPH/DIAG IV INF INIT: CPT | Performed by: EMERGENCY MEDICINE

## 2022-01-04 RX ORDER — PIPERACILLIN SODIUM, TAZOBACTAM SODIUM 3; .375 G/15ML; G/15ML
3.38 INJECTION, POWDER, LYOPHILIZED, FOR SOLUTION INTRAVENOUS ONCE
Status: COMPLETED | OUTPATIENT
Start: 2022-01-04 | End: 2022-01-04

## 2022-01-04 RX ORDER — VANCOMYCIN HYDROCHLORIDE 1 G/200ML
1000 INJECTION, SOLUTION INTRAVENOUS EVERY 12 HOURS
Status: DISCONTINUED | OUTPATIENT
Start: 2022-01-04 | End: 2022-01-07

## 2022-01-04 RX ORDER — ISOSORBIDE MONONITRATE 30 MG/1
60 TABLET, EXTENDED RELEASE ORAL DAILY
Status: DISCONTINUED | OUTPATIENT
Start: 2022-01-05 | End: 2022-01-07 | Stop reason: HOSPADM

## 2022-01-04 RX ORDER — PANTOPRAZOLE SODIUM 40 MG/1
40 TABLET, DELAYED RELEASE ORAL
Status: DISCONTINUED | OUTPATIENT
Start: 2022-01-05 | End: 2022-01-07 | Stop reason: HOSPADM

## 2022-01-04 RX ORDER — CLOPIDOGREL BISULFATE 75 MG/1
75 TABLET ORAL DAILY
Status: DISCONTINUED | OUTPATIENT
Start: 2022-01-05 | End: 2022-01-07 | Stop reason: HOSPADM

## 2022-01-04 RX ORDER — ATORVASTATIN CALCIUM 40 MG/1
40 TABLET, FILM COATED ORAL EVERY EVENING
Status: DISCONTINUED | OUTPATIENT
Start: 2022-01-04 | End: 2022-01-07 | Stop reason: HOSPADM

## 2022-01-04 RX ORDER — ACETAMINOPHEN 650 MG/1
650 SUPPOSITORY RECTAL EVERY 6 HOURS PRN
Status: DISCONTINUED | OUTPATIENT
Start: 2022-01-04 | End: 2022-01-07 | Stop reason: HOSPADM

## 2022-01-04 RX ORDER — FENTANYL CITRATE 50 UG/ML
25 INJECTION, SOLUTION INTRAMUSCULAR; INTRAVENOUS ONCE
Status: COMPLETED | OUTPATIENT
Start: 2022-01-04 | End: 2022-01-04

## 2022-01-04 RX ORDER — DEXTROSE MONOHYDRATE 25 G/50ML
25-50 INJECTION, SOLUTION INTRAVENOUS
Status: DISCONTINUED | OUTPATIENT
Start: 2022-01-04 | End: 2022-01-07 | Stop reason: HOSPADM

## 2022-01-04 RX ORDER — LIDOCAINE 40 MG/G
CREAM TOPICAL
Status: DISCONTINUED | OUTPATIENT
Start: 2022-01-04 | End: 2022-01-07 | Stop reason: HOSPADM

## 2022-01-04 RX ORDER — FUROSEMIDE 40 MG
40 TABLET ORAL 2 TIMES DAILY
Status: DISCONTINUED | OUTPATIENT
Start: 2022-01-04 | End: 2022-01-07 | Stop reason: HOSPADM

## 2022-01-04 RX ORDER — LEVOTHYROXINE SODIUM 137 UG/1
137 TABLET ORAL AT BEDTIME
Status: DISCONTINUED | OUTPATIENT
Start: 2022-01-04 | End: 2022-01-07 | Stop reason: HOSPADM

## 2022-01-04 RX ORDER — PIPERACILLIN SODIUM, TAZOBACTAM SODIUM 3; .375 G/15ML; G/15ML
3.38 INJECTION, POWDER, LYOPHILIZED, FOR SOLUTION INTRAVENOUS EVERY 6 HOURS
Status: DISCONTINUED | OUTPATIENT
Start: 2022-01-05 | End: 2022-01-07

## 2022-01-04 RX ORDER — LIDOCAINE HYDROCHLORIDE 10 MG/ML
INJECTION, SOLUTION EPIDURAL; INFILTRATION; INTRACAUDAL; PERINEURAL
Status: DISCONTINUED
Start: 2022-01-04 | End: 2022-01-05 | Stop reason: HOSPADM

## 2022-01-04 RX ORDER — CARVEDILOL 25 MG/1
25 TABLET ORAL 2 TIMES DAILY WITH MEALS
Status: DISCONTINUED | OUTPATIENT
Start: 2022-01-05 | End: 2022-01-07 | Stop reason: HOSPADM

## 2022-01-04 RX ORDER — GABAPENTIN 300 MG/1
600 CAPSULE ORAL 2 TIMES DAILY PRN
Status: DISCONTINUED | OUTPATIENT
Start: 2022-01-04 | End: 2022-01-07 | Stop reason: HOSPADM

## 2022-01-04 RX ORDER — NICOTINE POLACRILEX 4 MG
15-30 LOZENGE BUCCAL
Status: DISCONTINUED | OUTPATIENT
Start: 2022-01-04 | End: 2022-01-07 | Stop reason: HOSPADM

## 2022-01-04 RX ORDER — LISINOPRIL 2.5 MG/1
2.5 TABLET ORAL DAILY
Status: DISCONTINUED | OUTPATIENT
Start: 2022-01-05 | End: 2022-01-07 | Stop reason: HOSPADM

## 2022-01-04 RX ORDER — ACETAMINOPHEN 325 MG/1
650 TABLET ORAL EVERY 6 HOURS PRN
Status: DISCONTINUED | OUTPATIENT
Start: 2022-01-04 | End: 2022-01-07 | Stop reason: HOSPADM

## 2022-01-04 RX ADMIN — PIPERACILLIN AND TAZOBACTAM 3.38 G: 3; .375 INJECTION, POWDER, LYOPHILIZED, FOR SOLUTION INTRAVENOUS at 19:38

## 2022-01-04 RX ADMIN — VANCOMYCIN HYDROCHLORIDE 1000 MG: 1 INJECTION, SOLUTION INTRAVENOUS at 20:12

## 2022-01-04 RX ADMIN — FENTANYL CITRATE 25 MCG: 50 INJECTION INTRAMUSCULAR; INTRAVENOUS at 18:58

## 2022-01-04 RX ADMIN — ATORVASTATIN CALCIUM 40 MG: 40 TABLET, FILM COATED ORAL at 23:24

## 2022-01-04 ASSESSMENT — MIFFLIN-ST. JEOR: SCORE: 2031.32

## 2022-01-04 NOTE — ED TRIAGE NOTES
"Triage Assessment & Note:    /60   Pulse 84   Temp 97  F (36.1  C) (Temporal)   Resp 16   Ht 1.93 m (6' 4\")   Wt 117.5 kg (259 lb)   SpO2 99%   BMI 31.53 kg/m        Patient presents with: Pt comes to triage with reports of left great toe infection. Pt recently admitted for similar issues. No reports of fever, cough, SOB, CP, or travel.     Home Treatments/Remedies: home medications    Febrile / Afebrile: afebrile    Duration of C/o: ongoing issues    Nichole George RN  January 4, 2022        "

## 2022-01-04 NOTE — ED PROVIDER NOTES
ED Provider Note  [facility]      History     Chief Complaint   Patient presents with     Wound Infection     left foot     HPI  Jayro W Jael is a 71 year old male who has a PMH notable for CVA, CAD and prior MI w/ CHF with cardiomyopathy, HTN, DM2 w/ peripheral neuropathy, diabetic foot infection and prior LLE cellulitis, osteomyelitis, prior bacteremia, lightheadedness and prior syncope presentations, CKD, sleep apnea, et al., who was just admitted at [facility] (discharged 7 days ago) in setting of chest pain and possible left great toe cellulitis, presenting w/ worsening pain and skin changes to the left great toe/foot area.    RECENT ADMISSION:   Patient was admitted 12/27/2021-12/29/2021, having presented to the ED on 12/27 with a 2-week history of exertional chest pain that they thought was concerning for unstable angina.  While admitted he had improvement of discomfort with sublingual nitroglycerin.  Cardiology was consulted who recommended coronary angiogram disease performed on 12/28 showing no changes since last angiography in April, with patent LAD, RCA, and OM3 stents w/ mild in-stent restenosis.  He was initially on a heparin drip which was discontinued after the angiography with plan to continue prior Plavix and add aspirin.  EF on 12/28 was 41% with severe hypokinesis to the mid and distal septum, moderate global hypokinesis, as well as severe pulmonary hypertension.  With regards to his ischemic cardiomyopathy and pulmonary hypertension they did not think that he was fluid overloaded, but during admission did have some additional fluid gain, for which they increased his Lasix from 60 mg daily to 80 mg daily, and recommended an outpatient sleep study.  With regards to his toe, they thought this was possibly partially related to chronic PAD though noted he had been evaluated at [facility] recently by the Vascular Medicine team who noted he had a callus over the left great toe  with ecchymoses beneath it, having bluntly removed such in office without any blood or ulcer underneath.  It initially started him on IV Ancef, which was discontinued and he was transitioned to p.o. Keflex.  He had no history of gout and his uric acid was only mildly elevated (9.1).  They were going to have him follow-up with the outpatient podiatrist for ongoing monitoring.      CURRENT PRESENTATION:   Patient reports that since discharge the pain in his toe and skin changes have just gotten worse. Pain is increasing, toe looks more swollen, looks more moist, red and bruised, but w/o any trauma, falls, etc. No procedures on the foot since he says the callus was cut off at [place]. No fevers or chills. No body aches. Outside of the foot, no pain complaints elsewhere. No other swelling, no joint swelling. No chest or breathing symptoms. No other rashes or skin changes. No nausea or vomiting. No other symptoms or complaints at this time. Please see ROS for further details.        ECHOCARDIOGRAM (12/28/21):  The left ventricle is normal in size.  There is mild concentric left ventricular hypertrophy.  Biplane LVEF is 41%.  The apex and mid to distal septum are severely hypokinetic.  There is moderate global hypokinesia of the left ventricle.  Diastolic Doppler findings (E/E' ratio and/or other parameters) suggest left ventricular filling pressures are increased.  The right ventricle is normal size.  Moderately decreased right ventricular systolic function  There is mild (1+) tricuspid regurgitation.  Severe (>55mmHg) pulmonary hypertension is present.  Compared to the last echo of 4/2021 the EF is similar but estimated right heart pressures are higher.    Left Toe XR (12/28/21):  Soft tissue swelling of the great toe. No evidence of acute fracture. Mild to moderate first MTP degenerative changes. Mild irregularity of the medial aspect of the head of the first metatarsal could be chronic erosive change. Recommend    clinical correlation for history of gout.      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     Cardiomyopathy (H)      Cellulitis of left lower extremity 3/13/2018     Cerebral infarction (H)     eye sight decreased in peripheral of right side and blurry     Cerebral infarction (H) 2016     Diabetic ulcer of left midfoot associated with type 2 diabetes mellitus, with fat layer exposed (H) 3/13/2018     Essential hypertension 11/13/2002     Essential hypertension, benign 11/13/2002     Generalized osteoarthrosis, unspecified site 11/13/2002     Lightheadedness 12/27/2021     Microalbuminuria 3/13/2018    X1     Myocardial infarction (H)      Myocardial infarction (H) 06/28/2005     Neuropathy      Neuropathy 2016     Sepsis (H)      Sleep apnea     doesn't use it     Sleep apnea 2006     Substance abuse (H)      Substance abuse (H)      Syncope, unspecified syncope type 3/13/2018     Syncope, unspecified syncope type 03/13/2018     Thyroid disease     takes medicine     Thyroid disease 2005     Tobacco use disorder 11/13/2002     Type 2 diabetes mellitus (H) 2000     Type 2 diabetes mellitus with diabetic peripheral angiopathy without gangrene, unspecified long term insulin use status 2005     Past Surgical History:   Procedure Laterality Date     AMPUTATE TOE(S) Right 1/6/2019    Procedure: Right open second toe partial amputation;  Surgeon: Sabino Garcia DPM;  Location: [facility] OR     AMPUTATE TOE(S) Right 1/8/2019    Procedure: 1.  Revision right second toe amputation with resection of distal half of proximal phalanx with full closure.    2.  Debridement of callus/preulcerative lesion, distal left second toe and plantar left first metatarsophalangeal joint.;  Surgeon: Ryan Bhagat DPM;  Location: [facility]  OR     AMPUTATE TOE(S) Right 3/12/2019    Procedure: Partial right fourth toe amputation.;  Surgeon: Ryan Bhagat DPM;  Location: [facility] OR     AMPUTATE TOE(S) Right 5/6/2019    Procedure: Right partial third toe amputation;  Surgeon: Татьяна Mckeon DPM, Podiatry/Foot and Ankle Surgery;  Location: [facility] OR     AMPUTATE TOE(S) Left 4/18/2020    Procedure: LEFT SECOND TOE AMPUTATION;  Surgeon: Ryan Bhagat DPM;  Location: [facility] OR     AMPUTATE TOE(S) Left 5/8/2021    Procedure: 1.  Amputation of the left third toe at the level of the proximal interphalangeal joint. 2.  Excisional debridement of less than 20 square cm down to the level of the deeper skin, plantar left foot.;  Surgeon: Kwasi Root DPM;  Location: [facility] OR     AMPUTATE TOE(S) Right 2019    4 toes amputation      ANGIOGRAM  6/29/05    subtotal occ.mid LAD,SUSAN mid LAD     ANGIOGRAM  7/05    mild CAD,patent stent,no flow-limiting lesions,sev.LV dysf.LAD enlarged     ANGIOGRAM  2/09    Sev.single vessel disease,Mod LV dysf.distal LAD 90%,70-75% mid lad just before prev stent,SUSAN to prox.mid LAD, endeavor to distal LAD     ANGIOGRAM  11/13/13    restenosis, stent LAD     BACK SURGERY      back surgery x3     CARDIAC CATHETERIZATION  07/08/2019     CARDIAC CATHETERIZATION Left 06/13/2017     CARDIAC CATHETERIZATION  2005,2009,00480     CORONARY STENT PLACEMENT  07/08/2019     CV CORONARY ANGIOGRAM N/A 7/8/2019    Procedure: Coronary Angiogram;  Surgeon: Jose Alfredo Crockett MD;  Location:  HEART CARDIAC CATH LAB     CV CORONARY ANGIOGRAM N/A 4/9/2021    Procedure: CV CORONARY ANGIOGRAM;  Surgeon: Warren Paulson MD;  Location: [facility] CARDIAC CATH LAB     CV HEART CATHETERIZATION WITH POSSIBLE INTERVENTION N/A 12/28/2021    Procedure: Coronary Angiogram;  Surgeon: Jose Alfredo Crockett MD;  Location:  HEART CARDIAC CATH LAB     CV INSTANTANEOUS WAVE-FREE RATIO N/A 12/28/2021    Procedure: Instantaneous Wave-Free Ratio;  Surgeon: Jose Alfredo Crockett  MD FABIOLA;  Location: [facility] CARD[facility]AC C[facility] LAB     CV LEFT HEART CATH N/A 7/8/2019    Procedure: Left Heart Cath;  Surgeon: Jose Alfredo Crockett MD;  [facility]     CV LEFT HEART CATH N/A 12/28/2021    Procedure: Left Heart Cath;  Surgeon: Jose Alfredo Crockett MD;  [facility]     CV LEFT VENTRICULOGRAM N/A 12/28/2021    Procedure: Left Ventriculogram;  Surgeon: Jose Alfredo Crockett MD;  [facility]: [facility] CARD[facility]AC [facility]     CV PCI ASPIRATION THROMECTOMY N/A 7/8/2019    Procedure: PCI Aspiration Thrombectomy;  Surgeon: Jose Alfredo Crockett MD;  Location: [facility] CARD[facility]AC [facility] LAB     CV PCI STENT DRUG ELUTING N/A 7/8/2019    Procedure: PCI Stent Drug Eluting;  Surgeon: Jose Alfredo Crockett MD;  Location: [facility] CARDIAC C[facility] LAB     HEART CATH LEFT HEART CATH  06/13/2017    SUSAN to OM3     INCISION AND DRAINAGE TOE, COMBINED Bilateral 9/11/2018    Procedure: COMBINED INCISION AND DRAINAGE TOE;  1) Irrigation and debridement ulcer left great toe  2) Amputation 3rd toe right foot at distal interphalangeal joint level;  Surgeon: Miki Harp MD;  Location: [facility] OR     IRRIGATION AND DEBRIDEMENT FOOT, COMBINED Left 3/12/2019    Procedure: Debridement of left foot ulcer sub first MPJ 2 x 2.5 cm down to and including subcutaneous tissue, callus debridement for preulcerative lesion, left second toe.;  Surgeon: Ryan Bhagat DPM;  Location:  OR     ORTHOPEDIC SURGERY      bunion surgery both feet     ORTHOPEDIC SURGERY      left shoulder     OTHER SURGICAL HISTORY  9 years old    cancer tumor mouth     SHOULDER ARTHROSCOPY W/ ROTATOR CUFF REPAIR Left 2004     SOFT TISSUE SURGERY      debridement of toe numerous time     VASCULAR SURGERY      7 stents in heart     VASCULAR SURGERY       [facility] NONSPECIFIC PROCEDURE      Laminectomy x 3 - (1983 x 2 & 1990)     [facility] NONSPECIFIC PROCEDURE Bilateral 1998    Bunionectomy     [facility] NONSPECIFIC PROCEDURE  1959    Gingival surgery at  age 9     No current outpatient medications on file.    Allergies   Allergen Reactions     No Known Allergies      Family History  Family History   Problem Relation Age of Onset     Cancer - colorectal Sister      Thyroid Disease Brother      Cardiovascular Brother      Diabetes Brother      Cancer Father         tumor in chest and throat     Arthritis Mother      Thyroid Disease Mother      Diabetes Mother      Glaucoma No family hx of      Macular Degeneration No family hx of      Social History   Social History     Tobacco Use     Smoking status: Former Smoker     Packs/day: 1.00     Years: 25.00     Pack years: 25.00     Types: Cigarettes     Start date:      Quit date: 2005     Years since quittin.4     Smokeless tobacco: Never Used   Substance Use Topics     Alcohol use: Yes     Alcohol/week: 4.0 standard drinks     Types: 4 Shots of liquor per week     Comment: OCCASSIONALLY      Drug use: No      Past medical history, past surgical history, medications, allergies, family history, and social history were reviewed with the patient. No additional pertinent items.       Review of Systems   Constitutional: Negative for chills, diaphoresis, fatigue and fever.   HENT: Negative.    Eyes: Negative.    Respiratory: Negative.  Negative for cough and shortness of breath.    Cardiovascular: Negative.    Gastrointestinal: Negative.    Genitourinary: Negative.    Musculoskeletal: Negative for neck pain and neck stiffness.        Left foot/toe symptoms   Skin: Positive for color change and wound.   Neurological: Negative for syncope, weakness, light-headedness and headaches.   Psychiatric/Behavioral: Negative for suicidal ideas.   All other systems reviewed and are negative.       A complete review of systems was performed with pertinent positives and negatives noted in the HPI, and all other systems negative.    Physical Exam   BP: 119/60  Pulse: 84  Temp: 97  F (36.1  C)  Resp: 16  Height: 193 cm (6'  "4\")  Weight: 117.5 kg (259 lb)  SpO2: 99 %  Physical Exam  CONSTITUTIONAL: Well-developed and well-nourished. Awake and alert. Non-toxic appearance. No acute distress.   HENT:   - Head: Normocephalic and atraumatic.   - Ears: Hearing and external ear grossly normal.   - Nose: Nose normal. No rhinorrhea. No epistaxis.   - Mouth/Throat: MMM  EYES: Conjunctivae and lids are normal. No scleral icterus.   NECK: Normal range of motion and phonation normal. Neck supple.  No tracheal deviation, no stridor. No edema or erythema noted.  CARDIOVASCULAR: Normal rate, regular rhythm and no appreciable abnormal heart sounds.  PULMONARY/CHEST: Normal work of breathing. No accessory muscle usage or stridor. No respiratory distress.  No appreciable abnormal breath sounds.  ABDOMEN: Soft, non-distended. No tenderness. No rigidity, rebound or guarding.   MUSCULOSKELETAL: Extremities warm and seemingly well perfused. Multiple missing toes from prior amputations. Skin changes to left foot/great toe as pictured below. Otherwise no acute findings. Normal compartments, no apparent joint effusions. No new strength/sensory deficits  NEUROLOGIC: Awake, alert. Not disoriented. He displays no atrophy and no tremor. Normal tone. No seizure activity. GCS 15. Has neuropathy, but no new changes. No strength deficits.   SKIN: Skin is warm and dry. No diaphoresis. No pallor. Skin changes to the left foot/toe as pictured below.   PSYCHIATRIC: Normal mood and affect. Speech and behavior normal. Thought processes linear. Cognition and memory are normal.                        Assessments & Plan (with Medical Decision Making)   IMPRESSION: 71 year old male w/ PMH notable for CVA, CAD and prior MI w/ CHF with cardiomyopathy, HTN, DM2 w/ peripheral neuropathy, diabetic foot infection and prior LLE cellulitis, osteomyelitis, prior bacteremia, lightheadedness and prior syncope presentations, CKD, sleep apnea, et al., who was just admitted at [facility] " (discharged 7 days ago) in setting of chest pain and possible left great toe cellulitis, presenting w/ worsening pain and skin changes to the left great toe/foot area.    Clinically, patient appears nontoxic, NAD. Otherwise on examination, abnormal foot/toe findings as described/pictured above. Ddx includes, but not limited to, skin/soft tissue infection (does not seem consistent w/ necrotizing SSTI), diabetic foot wound/infection, osteomyelitis, gout +/- infection, et al.     PLAN: Labs, foot/toe xr, abx, likely admission for further evaluation/management  - Risks/benefits of pursuing imaging reviewed and accepted.     RESULTS:  - Imaging: Written preliminary reports reviewed:  --- Foot XR: May have some osteomyelittis findings, finalized report pending  --- Results/reports reviewed w/ patient who expresses understanding of findings and F/U recommendations.    INTERVENTIONS:   - Abx: Vanc, zosyn  - Ortho consult    RE-EVALUATION:  - Pt otherwise continues to do well here in the ED, no acute issues or apparent concerning changes in vitals or clinical appearance.    DISCUSSIONS:  - w/ Ortho: They will see and evaluate the patient. In the mean time they are in agreement w/ starting antibiotics and don't think we need to delay that for any biopsy, etc, and can start those now.   - w/ IM: Reviewed patient/presentation, current state of workup/any pending studies. They will admit for further evaluation/management, F/U pending studies as needed, coordinate w/ consulting services as needed. No additional requests/recommendations for workup/management for in the ED at this time.   - w/ Patient: I have reviewed the available findings, plan with the patient. He expressed understanding and agreement with this plan. All questions answered to the best of our ability at this time.     DISPOSITION/PLANNING:  - IMPRESSION: Left great toe infection (diabetic foot infection), osteomyelitis  - DISPOSITION: Admit to IM for [facility]  evaluation/management  - PENDING: Wound culture/gram stain obtained by ortho  - RECOMMENDATIONS: Ortho consult    ______________________________________________________________________        --  Rosio Contreras MD  [facility] EMERGENCY DEPARTMENT  1/4/2022     Rosio Contreras MD  01/06/22 [id number]

## 2022-01-04 NOTE — TELEPHONE ENCOUNTER
There is a consent to communicate on file to speak with wife, Aixa.     Attempted to reach Aixa at home number. Call went to voicemail and the mailbox has not been set up to receive a message.     Phone call to patient. He is currently in the ED at the [facility] waiting to be seen. He states he has drainage coming out of his toe. He doesn't understand why he was released from the hospital.   Recommended he stay to be evaluated and to follow up with Dr. Mckeon afterwards. He verbalized understanding.     ROLANDA Bates RN

## 2022-01-05 LAB
ALBUMIN SERPL-MCNC: 2.9 G/DL (ref 3.4–5)
ALP SERPL-CCNC: 105 U/L (ref 40–150)
ALT SERPL W P-5'-P-CCNC: 25 U/L (ref 0–70)
ANION GAP SERPL CALCULATED.3IONS-SCNC: 8 MMOL/L (ref 3–14)
AST SERPL W P-5'-P-CCNC: 16 U/L (ref 0–45)
BILIRUB SERPL-MCNC: 1 MG/DL (ref 0.2–1.3)
BUN SERPL-MCNC: 20 MG/DL (ref 7–30)
CALCIUM SERPL-MCNC: 9.2 MG/DL (ref 8.5–10.1)
CHLORIDE BLD-SCNC: 105 MMOL/L (ref 94–109)
CO2 SERPL-SCNC: 25 MMOL/L (ref 20–32)
CREAT SERPL-MCNC: 1.14 MG/DL (ref 0.66–1.25)
CRP SERPL-MCNC: 16 MG/L (ref 0–8)
ERYTHROCYTE [DISTWIDTH] IN BLOOD BY AUTOMATED COUNT: 13.7 % (ref 10–15)
GFR SERPL CREATININE-BSD FRML MDRD: 69 ML/MIN/1.73M2
GLUCOSE BLD-MCNC: 142 MG/DL (ref 70–99)
GLUCOSE BLDC GLUCOMTR-MCNC: 148 MG/DL (ref 70–99)
GLUCOSE BLDC GLUCOMTR-MCNC: 182 MG/DL (ref 70–99)
GLUCOSE BLDC GLUCOMTR-MCNC: 202 MG/DL (ref 70–99)
GLUCOSE BLDC GLUCOMTR-MCNC: 234 MG/DL (ref 70–99)
GLUCOSE BLDC GLUCOMTR-MCNC: 246 MG/DL (ref 70–99)
HCT VFR BLD AUTO: 31 % (ref 40–53)
HGB BLD-MCNC: 10.3 G/DL (ref 13.3–17.7)
MCH RBC QN AUTO: 29.7 PG (ref 26.5–33)
MCHC RBC AUTO-ENTMCNC: 33.2 G/DL (ref 31.5–36.5)
MCV RBC AUTO: 89 FL (ref 78–100)
PLATELET # BLD AUTO: 243 10E3/UL (ref 150–450)
POTASSIUM BLD-SCNC: 3.3 MMOL/L (ref 3.4–5.3)
PROT SERPL-MCNC: 7 G/DL (ref 6.8–8.8)
RBC # BLD AUTO: 3.47 10E6/UL (ref 4.4–5.9)
SODIUM SERPL-SCNC: 138 MMOL/L (ref 133–144)
WBC # BLD AUTO: 5.3 10E3/UL (ref 4–11)

## 2022-01-05 PROCEDURE — 999N000147 HC STATISTIC PT IP EVAL DEFER

## 2022-01-05 PROCEDURE — 96372 THER/PROPH/DIAG INJ SC/IM: CPT

## 2022-01-05 PROCEDURE — L3260 AMBULATORY SURGICAL BOOT EAC: HCPCS

## 2022-01-05 PROCEDURE — 96367 TX/PROPH/DG ADDL SEQ IV INF: CPT | Performed by: EMERGENCY MEDICINE

## 2022-01-05 PROCEDURE — 250N000011 HC RX IP 250 OP 636: Performed by: EMERGENCY MEDICINE

## 2022-01-05 PROCEDURE — 85027 COMPLETE CBC AUTOMATED: CPT

## 2022-01-05 PROCEDURE — 99223 1ST HOSP IP/OBS HIGH 75: CPT | Mod: GC | Performed by: INTERNAL MEDICINE

## 2022-01-05 PROCEDURE — 120N000002 HC R&B MED SURG/OB UMMC

## 2022-01-05 PROCEDURE — 36415 COLL VENOUS BLD VENIPUNCTURE: CPT

## 2022-01-05 PROCEDURE — 250N000012 HC RX MED GY IP 250 OP 636 PS 637

## 2022-01-05 PROCEDURE — 87040 BLOOD CULTURE FOR BACTERIA: CPT | Performed by: STUDENT IN AN ORGANIZED HEALTH CARE EDUCATION/TRAINING PROGRAM

## 2022-01-05 PROCEDURE — 86140 C-REACTIVE PROTEIN: CPT

## 2022-01-05 PROCEDURE — 250N000013 HC RX MED GY IP 250 OP 250 PS 637

## 2022-01-05 PROCEDURE — 999N000157 HC STATISTIC RCP TIME EA 10 MIN

## 2022-01-05 PROCEDURE — 99232 SBSQ HOSP IP/OBS MODERATE 35: CPT | Mod: GC | Performed by: INTERNAL MEDICINE

## 2022-01-05 PROCEDURE — G0378 HOSPITAL OBSERVATION PER HR: HCPCS

## 2022-01-05 PROCEDURE — 82962 GLUCOSE BLOOD TEST: CPT

## 2022-01-05 PROCEDURE — 250N000011 HC RX IP 250 OP 636

## 2022-01-05 PROCEDURE — 36415 COLL VENOUS BLD VENIPUNCTURE: CPT | Performed by: STUDENT IN AN ORGANIZED HEALTH CARE EDUCATION/TRAINING PROGRAM

## 2022-01-05 PROCEDURE — 80053 COMPREHEN METABOLIC PANEL: CPT

## 2022-01-05 PROCEDURE — 96366 THER/PROPH/DIAG IV INF ADDON: CPT | Performed by: EMERGENCY MEDICINE

## 2022-01-05 PROCEDURE — 96368 THER/DIAG CONCURRENT INF: CPT | Performed by: EMERGENCY MEDICINE

## 2022-01-05 RX ADMIN — CARVEDILOL 25 MG: 25 TABLET, FILM COATED ORAL at 08:11

## 2022-01-05 RX ADMIN — INSULIN GLARGINE 20 UNITS: 100 INJECTION, SOLUTION SUBCUTANEOUS at 14:08

## 2022-01-05 RX ADMIN — LEVOTHYROXINE SODIUM 137 MCG: 0.14 TABLET ORAL at 21:29

## 2022-01-05 RX ADMIN — VANCOMYCIN HYDROCHLORIDE 1000 MG: 1 INJECTION, SOLUTION INTRAVENOUS at 21:36

## 2022-01-05 RX ADMIN — ATORVASTATIN CALCIUM 40 MG: 40 TABLET, FILM COATED ORAL at 20:47

## 2022-01-05 RX ADMIN — APIXABAN 5 MG: 5 TABLET, FILM COATED ORAL at 00:17

## 2022-01-05 RX ADMIN — PIPERACILLIN AND TAZOBACTAM 3.38 G: 3; .375 INJECTION, POWDER, FOR SOLUTION INTRAVENOUS at 20:47

## 2022-01-05 RX ADMIN — VANCOMYCIN HYDROCHLORIDE 1000 MG: 1 INJECTION, SOLUTION INTRAVENOUS at 08:12

## 2022-01-05 RX ADMIN — FUROSEMIDE 40 MG: 40 TABLET ORAL at 08:11

## 2022-01-05 RX ADMIN — PIPERACILLIN AND TAZOBACTAM 3.38 G: 3; .375 INJECTION, POWDER, FOR SOLUTION INTRAVENOUS at 14:55

## 2022-01-05 RX ADMIN — INSULIN ASPART 1 UNITS: 100 INJECTION, SOLUTION INTRAVENOUS; SUBCUTANEOUS at 00:25

## 2022-01-05 RX ADMIN — APIXABAN 5 MG: 5 TABLET, FILM COATED ORAL at 08:21

## 2022-01-05 RX ADMIN — PIPERACILLIN AND TAZOBACTAM 3.38 G: 3; .375 INJECTION, POWDER, FOR SOLUTION INTRAVENOUS at 03:17

## 2022-01-05 RX ADMIN — APIXABAN 5 MG: 5 TABLET, FILM COATED ORAL at 20:47

## 2022-01-05 RX ADMIN — ISOSORBIDE MONONITRATE 60 MG: 30 TABLET, EXTENDED RELEASE ORAL at 08:11

## 2022-01-05 RX ADMIN — GABAPENTIN 600 MG: 300 CAPSULE ORAL at 00:20

## 2022-01-05 RX ADMIN — INSULIN ASPART 2 UNITS: 100 INJECTION, SOLUTION INTRAVENOUS; SUBCUTANEOUS at 22:26

## 2022-01-05 RX ADMIN — PIPERACILLIN AND TAZOBACTAM 3.38 G: 3; .375 INJECTION, POWDER, FOR SOLUTION INTRAVENOUS at 00:30

## 2022-01-05 RX ADMIN — PANTOPRAZOLE SODIUM 40 MG: 40 TABLET, DELAYED RELEASE ORAL at 08:12

## 2022-01-05 RX ADMIN — CARVEDILOL 25 MG: 25 TABLET, FILM COATED ORAL at 17:12

## 2022-01-05 RX ADMIN — LEVOTHYROXINE SODIUM 137 MCG: 0.14 TABLET ORAL at 00:17

## 2022-01-05 RX ADMIN — CLOPIDOGREL BISULFATE 75 MG: 75 TABLET ORAL at 08:11

## 2022-01-05 RX ADMIN — LISINOPRIL 2.5 MG: 2.5 TABLET ORAL at 08:21

## 2022-01-05 ASSESSMENT — ACTIVITIES OF DAILY LIVING (ADL)
ADLS_ACUITY_SCORE: 8
ADLS_ACUITY_SCORE: 8
DIFFICULTY_EATING/SWALLOWING: NO
VISION_MANAGEMENT: GLASSES
WALKING_OR_CLIMBING_STAIRS: STAIR CLIMBING DIFFICULTY, DEPENDENT
DRESSING/BATHING_DIFFICULTY: NO
FALL_HISTORY_WITHIN_LAST_SIX_MONTHS: NO
ADLS_ACUITY_SCORE: 12
ADLS_ACUITY_SCORE: 12
EQUIPMENT_CURRENTLY_USED_AT_HOME: CANE, QUAD
HEARING_DIFFICULTY_OR_DEAF: NO
ADLS_ACUITY_SCORE: 8
WEAR_GLASSES_OR_BLIND: YES
ADLS_ACUITY_SCORE: 12
DIFFICULTY_COMMUNICATING: NO
ADLS_ACUITY_SCORE: 8
WALKING_OR_CLIMBING_STAIRS_DIFFICULTY: YES
TOILETING_ISSUES: NO
ADLS_ACUITY_SCORE: 12
ADLS_ACUITY_SCORE: 12
CONCENTRATING,_REMEMBERING_OR_MAKING_DECISIONS_DIFFICULTY: NO
DOING_ERRANDS_INDEPENDENTLY_DIFFICULTY: YES
ADLS_ACUITY_SCORE: 8

## 2022-01-05 NOTE — PLAN OF CARE
Admitted/transferred from: ED  2 RN   skin assessment completed by Earle Ty RN and Kristal   Skin assessment finding: left great toe infection cellulitis, and 2 middle toes missing, right 3 middle toe missing    Interventions/actions: left great  toe wrapped with Kerlix     Will continue to monitor.

## 2022-01-05 NOTE — PLAN OF CARE
Vitals:    22 1904 22 0908 22 1454 22 1521   BP: 117/77 (!) 147/84 119/63 123/66   BP Location:    Right arm   Pulse: 63 65 72 65   Resp: 16 18 18 18   Temp:   97.9  F (36.6  C) 96.8  F (36  C)   TempSrc:   Oral Oral   SpO2: 100% 100% 98% 98%   Weight:       Height:           Neuro: pt alert and oriented, presented for ED    VS:on arrival afebrile, vital stable, sating 98% on room air, denies SOB    B prior arrival, sliding scale given,     Cardiac: 65    Pain/Nausea: denies pain or nausea     Lines/Drains:20 G PIV on left upper arm, being treated with Zosyn,     Incision/Wound: Left great toe     GI/: voiding adequate, +BS,    Diet/Appetite: tolerating intake     Activity: up independently    Plan:  continue with plan  of  care.

## 2022-01-05 NOTE — H&P
[facility]    History and Physical - [employer]        Date of Admission:  1/4/2022    Assessment & Plan      Jayro Elder is a 71 year old male who has a history of T2DM (A1c 8.5 12/28/21), A fib, CAD, HTN, HLD, CKD IIIa, CHF (EF 41% 12/28/21), prior MI, CVA, who is admitted for worsening left great toe wound.     # Left great toe cellulitis  # Concern for osteomyelitis  He was evaluated at [place] by vascular 12/2021where they noted he had a callus over his left great toe with ecchymosis beneath that was bluntly removed with no blood or ulcer present underneath.  On 12/27, erythema was noted and he received IV Ancef before de-escalating to PO Keflex. He has history of left third toe osteomyelitis s/p amputation, excisional debridement in May 2021. Bone culture at that time grew MSSA, Finegoldia magna, Porphyromonas species. CRP 24.   - X-ray of left toe shows erosion concerning for osteomyelitis   - Ortho consulted, appreciate recs. No plan for OR at this time.   - Uric acid was mildly elevated at 9.1 during his last admission, 9.0 today.  No history of gout or concern for gout today  - Continue pain control with PTA gabapentin, Tylenol  - Wound culture, Blood cultures x2 pending (collected after receiving IV abx in ED)   - ID consulted, appreciate recs.   - Continue vanc/zosyn     # ICM (EF 41% on 12/28/21)  # Severe Pulmonary Hypertension  # HTN  Coronary angiogram performed on 12/28/2021 that showed no changes since April, with patent LAD, RCA, OM 3 stents with mild in-stent restenosis and no PCI.  No chest pain or shortness of breath today.  - Continue PTA lisinopril, carvedilol, Imdur, Lasix   - Continue PTA Plavix. ASA appears to have been restarted during last admission, but patient states he has not been taking it at home    # Atrial fibrillation (CHADSVASC 5)  - Rate controlled on exam, continue PTA carvedilol  - Continue PTA apixaban     # T2DM  with peripheral neuropathy  - Continue gabapentin PRN   - PTA insulin regimen: lantus 44U qAM, aspart 12U breakfast, lunch, 14U dinner  - Due to decreased PO intake today, will order lantus 20U tomorrow, high intensity sliding scale insulin. Will likely need to increase dose in next few days    # Hypothyroidism: continue PTA levothyroxine  # GERD: continue PTA protonix     Diet:   Diabetic diet  DVT Prophylaxis: DOAC  Taylor Catheter: Not present  Fluids: None  Central Lines: None  Code Status:   Full     Clinically Significant Risk Factors Present on Admission             # Coagulation Defect: home medication list includes an anticoagulant medication  # Platelet Defect: home medication list includes an antiplatelet medication   # Obesity: last Body mass index is 31.53 kg/m .      Disposition Plan   Expected Discharge:   TBD pending osteomyelitis treatment   Anticipated discharge location:  Awaiting care coordination huddle  Delays:          The patient's care was discussed with the Attending Physician, Dr. Antonio Venegas.    Davion Mcpherson MD  [facility]  Securely message with the Vocera Web Console (learn more here)  Text page via [employer] Paging/Directory    Please see sign in/sign out for up to date coverage information  ______________________________________________________________________    Chief Complaint   Left toe pain     History is obtained from the patient    History of Present Illness   Jayro Elder is a 71 year old male who has a history of T2DM (A1c 8.5 12/28/21), A fib, CAD, HTN, HLD, CKD IIIa, CHF, prior MI, CVA, who is admitted for worsening left great toe wound.     For his left toe wound, he says it started after he saw vascular surgery in early December.  Denies any trauma to that area.  When he was admitted on 12/27 for chest pain, he received Ancef that was de-escalate to Keflex.  Initially improved the erythema prior to  discharge, but he states the discharge on the lateral aspect of the great toe increased and was purulent and increased pain of the left foot. He also notes more redness and swelling.     Today, denies any chest pain, shortness of breath at rest, abdominal pain, nausea, vomiting, constipation, diarrhea, fevers, chills.  During his last admission on 12/27, he was worked up for chest pain and coronary angiogram showed no changes since April, with patent LAD, RCA, OM 3 stents with mild in-stent restenosis and no PCI.  Echo on 12/20 showed EF 41%, apex and mid to distal septum are severely hypokinetic, moderate global hypokinesis, diastolic Doppler findings suggestive of LV filling pressures increased, moderately decreased RV systolic function, and severe PH present.     Review of Systems    The 10 point Review of Systems is negative other than noted in the HPI or here.     Past Medical History    I have reviewed this patient's medical history and updated it with pertinent information if needed.   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     Cardiomyopathy (H)      Cellulitis of left lower extremity 3/13/2018     Cerebral infarction (H)     eye sight decreased in peripheral of right side and blurry     Cerebral infarction (H) 2016     Diabetic ulcer of left midfoot associated with type 2 diabetes mellitus, with fat layer exposed (H) 3/13/2018     Essential hypertension 11/13/2002     Essential hypertension, benign 11/13/2002     Generalized osteoarthrosis, unspecified site 11/13/2002     Lightheadedness 12/27/2021     Microalbuminuria 3/13/2018    X1     Myocardial infarction (H)      Myocardial infarction (H) 06/28/2005     Neuropathy      Neuropathy 2016     Sepsis (H)      Sleep apnea     doesn't use it     Sleep apnea 2006      Substance abuse (H)      Substance abuse (H)      Syncope, unspecified syncope type 3/13/2018     Syncope, unspecified syncope type 03/13/2018     Thyroid disease     takes medicine     Thyroid disease 2005     Tobacco use disorder 11/13/2002     Type 2 diabetes mellitus (H) 2000     Type 2 diabetes mellitus with diabetic peripheral angiopathy without gangrene, unspecified long term insulin use status 2005      Past Surgical History   I have reviewed this patient's surgical history and updated it with pertinent information if needed.  Past Surgical History:   Procedure Laterality Date     AMPUTATE TOE(S) Right 1/6/2019    Procedure: Right open second toe partial amputation;  Surgeon: Sabino Garcia DPM;  Location: [facility] OR     AMPUTATE TOE(S) Right 1/8/2019    Procedure: 1.  Revision right second toe amputation with resection of distal half of proximal phalanx with full closure.    2.  Debridement of callus/preulcerative lesion, distal left second toe and plantar left first metatarsophalangeal joint.;  Surgeon: Ryan Bhagat DPM;  Location: [facility] OR     AMPUTATE TOE(S) Right 3/12/2019    Procedure: Partial right fourth toe amputation.;  Surgeon: Ryan Bhagat DPM;  Location: [facility] OR     AMPUTATE TOE(S) Right 5/6/2019    Procedure: Right partial third toe amputation;  Surgeon: Татьяна Mckeon DPM, Podiatry/Foot and Ankle Surgery;  Location: [facility] OR     AMPUTATE TOE(S) Left 4/18/2020    Procedure: LEFT SECOND TOE AMPUTATION;  Surgeon: Ryan Bhagat DPM;  Location: [facility] OR     AMPUTATE TOE(S) Left 5/8/2021    Procedure: 1.  Amputation of the left third toe at the level of the proximal interphalangeal joint. 2.  Excisional debridement of less than 20 square cm down to the level of the deeper skin, plantar left foot.;  Surgeon: Kwasi Root DPM;  Location: [facility] OR     AMPUTATE TOE(S) Right 2019    4 toes amputation      ANGIOGRAM  6/29/05    subtotal occ.mid LAD,SUSAN mid LAD     ANGIOGRAM  7/05    mild  CAD,patent stent,no flow-limiting lesions,sev.LV dysf.LAD enlarged     ANGIOGRAM  2/09    Sev.single vessel disease,Mod LV dysf.distal LAD 90%,70-75% mid lad just before prev stent,SUSAN to prox.mid LAD, endeavor to distal LAD     ANGIOGRAM  11/13/13    restenosis, stent LAD     BACK SURGERY      back surgery x3     CARDIAC CATHETERIZATION  07/08/2019     CARDIAC CATHETERIZATION Left 06/13/2017     CARDIAC CATHETERIZATION  2005,2009,20013     CORONARY STENT PLACEMENT  07/08/2019     CV CORONARY ANGIOGRAM N/A 7/8/2019    Procedure: Coronary Angiogram;  Surgeon: Jose Alfredo Crockett MD;  Location: [facility] CARDIAC CATH LAB     CV CORONARY ANGIOGRAM N/A 4/9/2021    Procedure: CV CORONARY ANGIOGRAM;  Surgeon: Warren Paulson MD;  Location: [facility] CARDIAC CATH LAB     CV HEART CATHETERIZATION WITH POSSIBLE INTERVENTION N/A 12/28/2021    Procedure: Coronary Angiogram;  Surgeon: Jose Alfredo Crockett MD;  Location:  HEART CARDIAC CATH LAB     CV INSTANTANEOUS WAVE-FREE RATIO N/A 12/28/2021    Procedure: Instantaneous Wave-Free Ratio;  Surgeon: Jose Alfredo Crockett MD;  Location:  HEART CARDIAC CATH LAB     CV LEFT HEART CATH N/A 7/8/2019    Procedure: Left Heart Cath;  Surgeon: Jose Alfredo Crockett MD;  Location:  HEART CARDIAC CATH LAB     CV LEFT HEART CATH N/A 12/28/2021    Procedure: Left Heart Cath;  Surgeon: Jose Alfredo Crockett MD;  Location:  HEART CARDIAC CATH LAB     CV LEFT VENTRICULOGRAM N/A 12/28/2021    Procedure: Left Ventriculogram;  Surgeon: Jose Alfredo Crockett MD;  Location:  HEART CARDIAC CATH LAB     CV PCI ASPIRATION THROMECTOMY N/A 7/8/2019    Procedure: PCI Aspiration Thrombectomy;  Surgeon: Jose Alfredo Crockett MD;  Location:  HEART CARDIAC CATH LAB     CV PCI STENT DRUG ELUTING N/A 7/8/2019    Procedure: PCI Stent Drug Eluting;  Surgeon: Jose Alfredo Crockett MD;  Location:  HEART CARDIAC CATH LAB     HEART CATH LEFT HEART CATH  06/13/2017    SUSAN to OM3     INCISION AND DRAINAGE TOE, COMBINED  Bilateral 9/11/2018    Procedure: COMBINED INCISION AND DRAINAGE TOE;  1) Irrigation and debridement ulcer left great toe  2) Amputation 3rd toe right foot at distal interphalangeal joint level;  Surgeon: Miki Harp MD;  Location:  OR     IRRIGATION AND DEBRIDEMENT FOOT, COMBINED Left 3/12/2019    Procedure: Debridement of left foot ulcer sub first MPJ 2 x 2.5 cm down to and including subcutaneous tissue, callus debridement for preulcerative lesion, left second toe.;  Surgeon: Ryan Bhagat DPM;  Location:  OR     ORTHOPEDIC SURGERY      bunion surgery both feet     ORTHOPEDIC SURGERY      left shoulder     OTHER SURGICAL HISTORY  9 years old    cancer tumor mouth     SHOULDER ARTHROSCOPY W/ ROTATOR CUFF REPAIR Left 2004     SOFT TISSUE SURGERY      debridement of toe numerous time     VASCULAR SURGERY      7 stents in heart     VASCULAR SURGERY       RUST NONSPECIFIC PROCEDURE      Laminectomy x 3 - (1983 x 2 & 1990)     RUST NONSPECIFIC PROCEDURE Bilateral 1998    Bunionectomy     RUST NONSPECIFIC PROCEDURE  1959    Gingival surgery at age 9      Social History   I have reviewed this patient's social history and updated it with pertinent information if needed. Jayro MESSINA Shannonzahra  reports that he quit smoking about 16 years ago. His smoking use included cigarettes. He started smoking about 42 years ago. He has a 25.00 pack-year smoking history. He has never used smokeless tobacco. He reports current alcohol use of about 4.0 standard drinks of alcohol per week. He reports that he does not use drugs.    Family History   I have reviewed this patient's family history and updated it with pertinent information if needed.  Family History   Problem Relation Age of Onset     Cancer - colorectal Sister      Thyroid Disease Brother      Cardiovascular Brother      Diabetes Brother      Cancer Father         tumor in chest and throat     Arthritis Mother      Thyroid Disease Mother      Diabetes Mother       Glaucoma No family hx of      Macular Degeneration No family hx of        Prior to Admission Medications   Prior to Admission Medications   Prescriptions Last Dose Informant Patient Reported? Taking?   Continuous Blood Gluc  (FREESTYLE CLARITA 2 READER SYSTM) DRAGAN   No No   Si Device daily   Continuous Blood Gluc Sensor (FREESTYLE CLARITA 2 SENSOR SYSTM) MISC   No No   Si Units 4 times daily (before meals and nightly)   ammonium lactate (AMLACTIN) 12 % external cream   No No   Sig: Apply topically 2 times daily   apixaban ANTICOAGULANT (ELIQUIS) 5 MG tablet   No No   Sig: Take 1 tablet (5 mg) by mouth 2 times daily   atorvastatin (LIPITOR) 40 MG tablet   No No   Sig: Take 1 tablet (40 mg) by mouth every evening   carvedilol (COREG) 25 MG tablet   No No   Sig: Take 1 tablet (25 mg) by mouth 2 times daily (with meals)   cephALEXin (KEFLEX) 500 MG capsule   No No   Sig: Take 1 capsule (500 mg) by mouth 4 times daily for 6 days   clopidogrel (PLAVIX) 75 MG tablet   No No   Sig: Take 1 tablet (75 mg) by mouth daily   furosemide (LASIX) 40 MG tablet   Yes No   Sig: Take 1 tablet (40 mg) by mouth 2 times daily   gabapentin (NEURONTIN) 300 MG capsule   No No   Sig: TAKE 2-3 CAPSULES (600-900 MG) BY MOUTH AT BEDTIME   insulin aspart (NOVOLOG FLEXPEN) 100 UNIT/ML pen   Yes No   Sig: Inject Subcutaneous 3 times daily (with meals) Sliding Scale  Do not give sliding scale insulin if Pre-Meal BG less than 140.   For Pre-Meal:   - 200 give 2 units.    - 250 give 4 units.    - 300 give 6 units.    - 350 give 8 units.    - 400 give 10 units.   insulin aspart (NOVOLOG PEN) 100 UNIT/ML pen   No No   Sig: Inject 12 Units Subcutaneous 2 times daily (with meals) With breakfast and lunch   insulin aspart (NOVOLOG PEN) 100 UNIT/ML pen   No No   Sig: Inject 14 Units Subcutaneous daily (with dinner)   insulin glargine (LANTUS PEN) 100 UNIT/ML pen   Yes No   Sig: Inject 44 units subcutaneously once  every morning   insulin pen needle (31G X 6 MM) 31G X 6 MM miscellaneous   No No   Sig: Use  as directed.   isosorbide mononitrate (IMDUR) 60 MG 24 hr tablet   Yes No   Sig: Take 1 tablet (60 mg) by mouth daily   levothyroxine (SYNTHROID, LEVOTHROID) 137 MCG tablet  Self Yes No   Sig: Take 1 tablet by mouth once every evening   lisinopril (ZESTRIL) 2.5 MG tablet   No No   Sig: Take 1 tablet (2.5 mg) by mouth daily   nitroGLYcerin (NITROSTAT) 0.4 MG sublingual tablet   No No   Sig: For chest pain place 1 tablet under the tongue every 5 minutes for 3 doses. If symptoms persist 5 minutes after 1st dose call 911.   pantoprazole (PROTONIX) 40 MG EC tablet   No No   Sig: TAKE 1 TABLET BY MOUTH EVERY MORNING BEFORE BREAKFAST   triamcinolone (KENALOG) 0.1 % external cream   No No   Sig: Apply topically 2 times daily      Facility-Administered Medications: None     Allergies   Allergies   Allergen Reactions     No Known Allergies        Physical Exam   Vital Signs: Temp: 97  F (36.1  C) Temp src: Temporal BP: 117/77 Pulse: 63   Resp: 16 SpO2: 100 % O2 Device: None (Room air)    Weight: 259 lbs 0 oz    Constitutional: awake, alert, cooperative, no apparent distress, and appears stated age  Eyes: PERRL, EOMI  ENT: Normocephalic, without obvious abnormality, atraumatic, oral pharynx with moist mucous membranes  Respiratory: No increased work of breathing, good air exchange, clear to auscultation bilaterally, no crackles or wheezing  Cardiovascular: Irregularly irregular. No M/R/G. DP/PT pulses 2+. Trace pitting edema in lower extremities.   GI: Normal bowel sounds, soft, obese, non-tender  Skin: Redness and swelling of left great toe with some discharge staining wound dressing on lateral aspect of toe. Previous partial amputations of left 2nd and 3rd toes. Ball of left foot with callus, no open wound. Normal ROM of toe, foot.   On right foot, history of 2-4th toes partial amputation. Callus noted on ball of right  foot  Musculoskeletal: Full range of motion noted.  Motor strength is 5 out of 5 all extremities bilaterally.   Neurologic: Awake, alert, oriented to name, place and time.  Cranial nerves II-XII are grossly intact.  Motor is 5 out of 5 bilaterally.      Data   Data reviewed today: I reviewed all medications, new labs and imaging results over the last 24 hours.     Recent Labs   Lab 01/04/22  1541 12/29/21  1143 12/29/21  1054 12/29/21  0748 12/29/21  0624   WBC 4.8  --   --   --  5.4   HGB 10.3*  --   --   --  9.7*   MCV 91  --   --   --  93     --   --   --  167     --  136  --   --    POTASSIUM 3.4  --  3.5  --   --    CHLORIDE 103  --  101  --   --    CO2 32  --  30  --   --    BUN 21  --  18  --   --    CR 1.18  --  1.02  --   --    ANIONGAP 3  --  5  --   --    BETTIE 8.8  --  8.8  --   --    * 185* 196*   < >  --    ALBUMIN 3.2*  --   --   --   --    PROTTOTAL 7.6  --   --   --   --    BILITOTAL 0.9  --   --   --   --    ALKPHOS 122  --   --   --   --    ALT 30  --   --   --   --    AST 13  --   --   --   --     < > = values in this interval not displayed.     Recent Results (from the past 24 hour(s))   XR Toe Left G/E 2 Views    Narrative    EXAM: XR TOE LEFT G/E 2 VIEWS  LOCATION: [facility]  DATE/TIME: 1/4/2022 5:35 PM    INDICATION: Question of osteomyelitis or complication from infection. Pain and swelling.  COMPARISON: None.      Impression    IMPRESSION:   1. There is an erosion of the dorsal aspect of the articular surface of the base of the distal phalanx of the great toe seen on the lateral view. This would raise the question of osteomyelitis/septic joint in the appropriate clinical setting. MRI could   further evaluate if clinically warranted.  2. Soft tissue swelling in the great toe.  3. Partial amputation of the second and third toes. Moderate osteoarthrosis at the first MTP joint.   Foot XR, G/E 3 views, left    Narrative    EXAM: XR  FOOT LEFT G/E 3 VIEWS  LOCATION: [facility]  DATE/TIME: 1/4/2022 5:34 PM    INDICATION: Pain and swelling.  COMPARISON: None.      Impression    IMPRESSION: Amputation of the distal end of the 2nd and 3rd toes. Soft tissue swelling in the great toe. Mild to moderate multifocal osteoarthrosis. Plantar calcaneal enthesophyte. Calcifications in the region of the plantar fascia. Arterial   calcifications. Small osteochondroma of the dorsal aspect of the base of the proximal phalanx of the great toe again noted. Otherwise negative. No radiographic evidence of osteomyelitis.

## 2022-01-05 NOTE — PHARMACY-VANCOMYCIN DOSING SERVICE
"Pharmacy Vancomycin Initial Note  Date of Service 2022  Patient's  1950  71 year old, male    Indication: Skin and Soft Tissue Infection    Current estimated CrCl = Estimated Creatinine Clearance: 80.5 mL/min (based on SCr of 1.18 mg/dL).    Creatinine for last 3 days  2022:  3:41 PM Creatinine 1.18 mg/dL    Recent Vancomycin Level(s) for last 3 days  No results found for requested labs within last 72 hours.      Vancomycin IV Administrations (past 72 hours)      No vancomycin orders with administrations in past 72 hours.                Nephrotoxins and other renal medications (From now, onward)    Start     Dose/Rate Route Frequency Ordered Stop    22 1830  vancomycin (VANCOCIN) 1000 mg in dextrose 5% 200 mL PREMIX         1,000 mg  200 mL/hr over 1 Hours Intravenous EVERY 12 HOURS 22 1747      22 1730  piperacillin-tazobactam (ZOSYN) 3.375 g vial to attach to  mL bag        Note to Pharmacy: For [facility], [facility] and [facility]: For Zosyn-naive patients, use the \"Zosyn initial dose + extended infusion\" order panel.    3.375 g  over 30 Minutes Intravenous ONCE 22 1725            Contrast Orders - past 72 hours (72h ago, onward)            None          [employer] Prediction of Planned Initial Vancomycin Regimen  Regimen: 1000 mg every 12 hours  Target exposure: AUC24 (range) 400-600 mg/L.hr (  AUC24,ss: 495 mg/L.hr(  Probability of AUC24 > 400: 74 %(  Ctrough,ss: 17 mg/L(  Probability of Ctrough,ss > 20: 32 %(  Probability of nephrotoxicity (Lodise KANCHAN ): 13 %  Plan:  1. Start vancomycin  1000 mg IV q12h.   2. Vancomycin monitoring method: AUC  3. Vancomycin therapeutic monitoring goal: 400-600 mg*h/L  4. Pharmacy will check vancomycin levels as appropriate in 1-3 Days.    5. Serum creatinine levels will be ordered daily for the first week of therapy and at least twice weekly for subsequent weeks.      Cheryle Fuentes, PharmD  "

## 2022-01-05 NOTE — PROGRESS NOTES
INTERNAL MEDICINE   DAILY PROGRESS NOTE  [facility]    Jayro Elder ([id number])   Admission Date: 1/4/2022  Date of Service: 01/05/2022    Changes Today   - ID Consulted. Appreciate Recommendations  - Continue IV Vancomycin and Zosyn  - No OR plans per Ortho  - Follow up cultures and anticipate plan to transition to oral regimen  - PT/Orthotics Consult    Assessment & Plan   Jayro Elder is a 71 year old male who has a history of T2DM (A1c 8.5 12/28/21), A fib, CAD, HTN, HLD, CKD IIIa, CHF (EF 41% 12/28/21), prior MI, CVA, who is admitted for worsening left great toe wound concerning for osteomyelitis.     # Left 1st Toe Osteomyelitis  He was evaluated at [facility] by vascular 12/2021where they noted he had a callus over his left great toe with ecchymosis beneath that was bluntly removed with no blood or ulcer present underneath.  On 12/27, erythema was noted and he received IV Ancef before de-escalating to PO Keflex. He has history of left third toe osteomyelitis s/p amputation, excisional debridement in May 2021. Bone culture at that time grew MSSA, Finegoldia magna, Porphyromonas species. CRP 24.   - X-ray of left toe shows erosion concerning for osteomyelitis   - Ortho consulted, appreciate recs. No plan for OR at this time.   - Uric acid was mildly elevated at 9.1 during his last admission, 9.0 today.  No history of gout or concern for gout today  - Continue pain control with PTA gabapentin, Tylenol  - Wound culture, Blood cultures x2 pending (collected after receiving IV abx in ED)   - ID consulted, appreciate recs.    > Continue vanc/zosyn    > Follow up culture results tomorrow. Possible transition to oral regimen  - PT/OT  - Orthotics Consult     # ICM (EF 41% on 12/28/21)  # Severe Pulmonary Hypertension  # HTN  Coronary angiogram performed on 12/28/2021 that showed no changes since April, with patent LAD, RCA, OM 3 stents with mild in-stent restenosis and no PCI.  No chest pain or shortness of  "breath today.  - Continue PTA lisinopril, carvedilol, Imdur, Lasix   - Continue PTA Plavix. ASA appears to have been restarted during last admission, but patient states he has not been taking it at home     # Atrial fibrillation (CHADSVASC 5)  - Rate controlled on exam, continue PTA carvedilol  - Continue PTA apixaban      # T2DM with peripheral neuropathy  - Continue gabapentin PRN   - PTA insulin regimen: lantus 44U qAM, aspart 12U breakfast, lunch, 14U dinner  - Due to decreased PO intake today, will order lantus 20U tomorrow, high intensity sliding scale insulin. Will likely need to increase dose in next few days     # Hypothyroidism: continue PTA levothyroxine  # GERD: continue PTA protonix        Diet:   Diabetic diet  DVT Prophylaxis: DOAC  Taylor Catheter: Not present  Fluids: None  Central Lines: None  Code Status:  Full     Seen and discussed with my attending physician, Dr. Benitez, who agrees with above assessment and plan.      Nathan Nath MD  Internal Medicine PGY-3  Pager #: [phone]      Subjective     Nursing note was reviewed, no acute event overnight. This morning, patient frustrated still in the ED hallway. Denies any pain in his feet. Denies any fever/chills, nausea/vomiting, chest pain, shortness of breath, abdominal pain or other discomfort.    ROS negative except above.    Objective   Most recent vital signs:  /66 (BP Location: Right arm)   Pulse 65   Temp 96.8  F (36  C) (Oral)   Resp 18   Ht 1.93 m (6' 4\")   Wt 117.5 kg (259 lb)   SpO2 98%   BMI 31.53 kg/m    Temp:  [96.8  F (36  C)-97.9  F (36.6  C)] 96.8  F (36  C)  Pulse:  [63-72] 65  Resp:  [16-18] 18  BP: (117-147)/(63-84) 123/66  SpO2:  [98 %-100 %] 98 %  Wt Readings from Last 2 Encounters:   01/04/22 117.5 kg (259 lb)   12/29/21 117.8 kg (259 lb 12.8 oz)     No intake or output data in the 24 hours ending 01/05/22 1647    Physical exam:  Constitutional: awake, alert, cooperative, no apparent distress, and appears " stated age  Eyes: PERRL, EOMI  ENT: Normocephalic, without obvious abnormality, atraumatic, oral pharynx with moist mucous membranes  Respiratory: No increased work of breathing, good air exchange, clear to auscultation bilaterally, no crackles or wheezing  Cardiovascular: Irregularly irregular. No M/R/G. DP/PT pulses 2+. Trace pitting edema in lower extremities.   GI: Normal bowel sounds, soft, obese, non-tender  Skin: Redness and swelling of left great toe with some discharge staining wound dressing on lateral aspect of toe. Previous partial amputations of left 2nd and 3rd toes. Ball of left foot with callus, no open wound. Normal ROM of toe, foot.   On right foot, history of 2-4th toes partial amputation.  Musculoskeletal: Full range of motion noted.  Motor strength is 5 out of 5 all extremities bilaterally.   Neurologic: Awake, alert, oriented to name, place and time.  Cranial nerves II-XII are grossly intact.  No focal neurologic deficits    Labs  CMP  Recent Labs   Lab 01/05/22  1302 01/05/22  0823 01/05/22  0631 01/05/22  0016 01/04/22  1541   NA  --   --  138  --  138   POTASSIUM  --   --  3.3*  --  3.4   CHLORIDE  --   --  105  --  103   CO2  --   --  25  --  32   ANIONGAP  --   --  8  --  3   * 148* 142* 202* 144*   BUN  --   --  20  --  21   CR  --   --  1.14  --  1.18   GFRESTIMATED  --   --  69  --  66   BETTIE  --   --  9.2  --  8.8   PROTTOTAL  --   --  7.0  --  7.6   ALBUMIN  --   --  2.9*  --  3.2*   BILITOTAL  --   --  1.0  --  0.9   ALKPHOS  --   --  105  --  122   AST  --   --  16  --  13   ALT  --   --  25  --  30     CBC  Recent Labs   Lab 01/05/22  0631 01/04/22  1541   WBC 5.3 4.8   RBC 3.47* 3.50*   HGB 10.3* 10.3*   HCT 31.0* 31.7*   MCV 89 91   MCH 29.7 29.4   MCHC 33.2 32.5   RDW 13.7 13.9    249     Imaging  Xray Left Toe  1/4/2022  IMPRESSION:   1. There is an erosion of the dorsal aspect of the articular surface of the base of the distal phalanx of the great toe seen on the  lateral view. This would raise the question of osteomyelitis/septic joint in the appropriate clinical setting. MRI could   further evaluate if clinically warranted.  2. Soft tissue swelling in the great toe.  3. Partial amputation of the second and third toes. Moderate osteoarthrosis at the first MTP joint.

## 2022-01-05 NOTE — PROGRESS NOTES
Care Management Follow Up    Length of Stay (days): 0    Expected Discharge Date:  TBD     Concerns to be Addressed:     OLOSN Document  Patient plan of care discussed at interdisciplinary rounds: Yes    Anticipated Discharge Disposition:  TBD     Anticipated Discharge Services:    Anticipated Discharge DME:      Patient/family educated on Medicare website which has current facility and service quality ratings:  N/A  Education Provided on the Discharge Plan:  N/A  Patient/Family in Agreement with the Plan:  N/A    Referrals Placed by CM/SW:  Not at this time  Private pay costs discussed: insurance costs out of pocket expenses, co-pays and deductibles    Additional Information:       MARILEE met with Jayro at bedside due to his Observation status. SW explained the Medicare Outpatient Observation Notice (MOON) and why pt was receiving it. SW addressed questions and concerns, then asked Jaryo to sign document. Pt signed OLSON at 1222.  [phone][facility] at 1244,  then placed OLSON in his chart.    SW will continue to follow as needed.    NAM Garcia, LGSW  ED/OBS   [facility]  Phone: [phone]  Pager: [phone]  Fax: [phone]     On-call pager, [phone], 4:00 pm to midnight

## 2022-01-05 NOTE — CONSULTS
[facility]  ORTHOPAEDIC SURGERY CONSULT    DATE OF CONSULT: 1/4/2022 9:49 PM    REQUESTING PROVIDER: Rosio Contreras MD    CC: left toe wound    DATE OF INJURY: NA    HISTORY OF PRESENT ILLNESS:   Jayro Elder is a 71 year old male with a history of diabetes, coronary artery disease, cardiomyopathy, peripheral neuropathy, previous toe amputations on bilateral feet who was recently admitted at [facility] for concerns of coronary artery disease.  During that hospitalization he was noted to have redness and swelling of the left great toe.  He was placed on oral Keflex and outpatient follow-up was recommended.  He had noted over the past few days that his toes becoming more red and swollen and had drainage from the lateral aspect of the toe.  He therefore presented for evaluation.  He has not any fevers or chills.  He is still able to ambulate.  No chest pain, shortness of breath or any other complaints.    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     Cardiomyopathy (H)      Cellulitis of left lower extremity 3/13/2018     Cerebral infarction (H)     eye sight decreased in peripheral of right side and blurry     Cerebral infarction (H) 2016     Diabetic ulcer of left midfoot associated with type 2 diabetes mellitus, with fat layer exposed (H) 3/13/2018     Essential hypertension 11/13/2002     Essential hypertension, benign 11/13/2002     Generalized osteoarthrosis, unspecified site 11/13/2002     Lightheadedness 12/27/2021     Microalbuminuria 3/13/2018    X1     Myocardial infarction (H)      Myocardial infarction (H) 06/28/2005     Neuropathy      Neuropathy 2016     Sepsis (H)      Sleep apnea     doesn't use it     Sleep apnea 2006     Substance abuse (H)      Substance abuse (H)       Syncope, unspecified syncope type 3/13/2018     Syncope, unspecified syncope type 03/13/2018     Thyroid disease     takes medicine     Thyroid disease 2005     Tobacco use disorder 11/13/2002     Type 2 diabetes mellitus (H) 2000     Type 2 diabetes mellitus with diabetic peripheral angiopathy without gangrene, unspecified long term insulin use status 2005       PAST SURGICAL HISTORY:    Past Surgical History:   Procedure Laterality Date     AMPUTATE TOE(S) Right 1/6/2019    Procedure: Right open second toe partial amputation;  Surgeon: Sabino Garcia DPM;  Location: [facility] OR     AMPUTATE TOE(S) Right 1/8/2019    Procedure: 1.  Revision right second toe amputation with resection of distal half of proximal phalanx with full closure.    2.  Debridement of callus/preulcerative lesion, distal left second toe and plantar left first metatarsophalangeal joint.;  Surgeon: Ryan Bhagat DPM;  Location: [facility] OR     AMPUTATE TOE(S) Right 3/12/2019    Procedure: Partial right fourth toe amputation.;  Surgeon: Ryan Bhagat DPM;  Location: [facility] OR     AMPUTATE TOE(S) Right 5/6/2019    Procedure: Right partial third toe amputation;  Surgeon: Татьяна Mckeon DPM, Podiatry/Foot and Ankle Surgery;  Location: [facility] OR     AMPUTATE TOE(S) Left 4/18/2020    Procedure: LEFT SECOND TOE AMPUTATION;  Surgeon: Ryan Bhagat DPM;  Location: [facility] OR     AMPUTATE TOE(S) Left 5/8/2021    Procedure: 1.  Amputation of the left third toe at the level of the proximal interphalangeal joint. 2.  Excisional debridement of less than 20 square cm down to the level of the deeper skin, plantar left foot.;  Surgeon: Kwasi Root DPM;  Location: [facility] OR     AMPUTATE TOE(S) Right 2019    4 toes amputation      ANGIOGRAM  6/29/05    subtotal occ.mid LAD,SUSAN mid LAD     ANGIOGRAM  7/05    mild CAD,patent stent,no flow-limiting lesions,sev.LV dysf.LAD enlarged     ANGIOGRAM  2/09    Sev.single vessel disease,Mod LV dysf.distal LAD  90%,70-75% mid lad just before prev stent,SUSAN to prox.mid LAD, endeavor to distal LAD     ANGIOGRAM  11/13/13    restenosis, stent LAD     BACK SURGERY      back surgery x3     CARDIAC CATHETERIZATION  07/08/2019     CARDIAC CATHETERIZATION Left 06/13/2017     CARDIAC CATHETERIZATION  2005,2009,20013     CORONARY STENT PLACEMENT  07/08/2019     CV CORONARY ANGIOGRAM N/A 7/8/2019    Procedure: Coronary Angiogram;  Surgeon: Jose Alfredo Crockett MD;  Location:  HEART CARDIAC CATH LAB     CV CORONARY ANGIOGRAM N/A 4/9/2021    Procedure: CV CORONARY ANGIOGRAM;  Surgeon: Warren Paulson MD;  Location: [facility] CARDIAC CATH LAB     CV HEART CATHETERIZATION WITH POSSIBLE INTERVENTION N/A 12/28/2021    Procedure: Coronary Angiogram;  Surgeon: Jose Alfredo Crockett MD;  Location:  HEART CARDIAC CATH LAB     CV INSTANTANEOUS WAVE-FREE RATIO N/A 12/28/2021    Procedure: Instantaneous Wave-Free Ratio;  Surgeon: Jose Alfredo Crockett MD;  Location:  HEART CARDIAC CATH LAB     CV LEFT HEART CATH N/A 7/8/2019    Procedure: Left Heart Cath;  Surgeon: Jose Alfredo Crockett MD;  Location:  HEART CARDIAC CATH LAB     CV LEFT HEART CATH N/A 12/28/2021    Procedure: Left Heart Cath;  Surgeon: Jose Alfredo Crockett MD;  Location:  HEART CARDIAC CATH LAB     CV LEFT VENTRICULOGRAM N/A 12/28/2021    Procedure: Left Ventriculogram;  Surgeon: Jose Alfredo Crockett MD;  Location:  HEART CARDIAC CATH LAB     CV PCI ASPIRATION THROMECTOMY N/A 7/8/2019    Procedure: PCI Aspiration Thrombectomy;  Surgeon: Jose Alfredo Crockett MD;  Location:  HEART CARDIAC CATH LAB     CV PCI STENT DRUG ELUTING N/A 7/8/2019    Procedure: PCI Stent Drug Eluting;  Surgeon: Jose Alfredo Crockett MD;  Location:  HEART CARDIAC CATH LAB     HEART CATH LEFT HEART CATH  06/13/2017    SUSAN to OM3     INCISION AND DRAINAGE TOE, COMBINED Bilateral 9/11/2018    Procedure: COMBINED INCISION AND DRAINAGE TOE;  1) Irrigation and debridement ulcer left great toe  2) Amputation 3rd  toe right foot at distal interphalangeal joint level;  Surgeon: Miki Harp MD;  Location: [facility] OR     IRRIGATION AND DEBRIDEMENT FOOT, COMBINED Left 3/12/2019    Procedure: Debridement of left foot ulcer sub first MPJ 2 x 2.5 cm down to and including subcutaneous tissue, callus debridement for preulcerative lesion, left second toe.;  Surgeon: Ryan Bhagat DPM;  Location: [facility] OR     ORTHOPEDIC SURGERY      bunion surgery both feet     ORTHOPEDIC SURGERY      left shoulder     OTHER SURGICAL HISTORY  9 years old    cancer tumor mouth     SHOULDER ARTHROSCOPY W/ ROTATOR CUFF REPAIR Left 2004     SOFT TISSUE SURGERY      debridement of toe numerous time     VASCULAR SURGERY      7 stents in heart     VASCULAR SURGERY       [facility] NONSPECIFIC PROCEDURE      Laminectomy x 3 - (1983 x 2 & 1990)     [facility] NONSPECIFIC PROCEDURE Bilateral 1998    Bunionectomy     [facility] NONSPECIFIC PROCEDURE  1959    Gingival surgery at age 9       MEDICATIONS:   Prior to Admission medications    Medication Sig Last Dose Taking? Auth Provider   ammonium lactate (AMLACTIN) 12 % external cream Apply topically 2 times daily   Kwasi Root DPM   apixaban ANTICOAGULANT (ELIQUIS) 5 MG tablet Take 1 tablet (5 mg) by mouth 2 times daily   Gill Doty PA   atorvastatin (LIPITOR) 40 MG tablet Take 1 tablet (40 mg) by mouth every evening   Livan King MD   carvedilol (COREG) 25 MG tablet Take 1 tablet (25 mg) by mouth 2 times daily (with meals)   Gael Christensen NP   cephALEXin (KEFLEX) 500 MG capsule Take 1 capsule (500 mg) by mouth 4 times daily for 6 days   Abiola Bernard PA-C   clopidogrel (PLAVIX) 75 MG tablet Take 1 tablet (75 mg) by mouth daily   Gael Christensen NP   Continuous Blood Gluc  (FREESTYLE CLARITA 2 READER SYSTM) DRAGAN 1 Device daily   Mel Perez PA-C   Continuous Blood Gluc Sensor (FREESTYLE CLARITA 2 SENSOR SYSTM) MISC 1 Units 4 times daily (before meals and nightly)    Mel Perez PA-C   furosemide (LASIX) 40 MG tablet Take 1 tablet (40 mg) by mouth 2 times daily   Abiola Bernard PA-C   gabapentin (NEURONTIN) 300 MG capsule TAKE 2-3 CAPSULES (600-900 MG) BY MOUTH AT BEDTIME   Davion Cordova PA-C   insulin aspart (NOVOLOG FLEXPEN) 100 UNIT/ML pen Inject Subcutaneous 3 times daily (with meals) Sliding Scale  Do not give sliding scale insulin if Pre-Meal BG less than 140.   For Pre-Meal:   - 200 give 2 units.    - 250 give 4 units.    - 300 give 6 units.    - 350 give 8 units.    - 400 give 10 units.   Unknown, Entered By History   insulin aspart (NOVOLOG PEN) 100 UNIT/ML pen Inject 12 Units Subcutaneous 2 times daily (with meals) With breakfast and lunch   Mel Perez PA-C   insulin aspart (NOVOLOG PEN) 100 UNIT/ML pen Inject 14 Units Subcutaneous daily (with dinner)   Mel Perez PA-C   insulin glargine (LANTUS PEN) 100 UNIT/ML pen Inject 44 units subcutaneously once every morning   Unknown, Entered By History   insulin pen needle (31G X 6 MM) 31G X 6 MM miscellaneous Use  as directed.   Sarah Bolivar MD   isosorbide mononitrate (IMDUR) 60 MG 24 hr tablet Take 1 tablet (60 mg) by mouth daily   Abiola Bernard PA-C   levothyroxine (SYNTHROID, LEVOTHROID) 137 MCG tablet Take 1 tablet by mouth once every evening   Henrry Figueroa PA-C   lisinopril (ZESTRIL) 2.5 MG tablet Take 1 tablet (2.5 mg) by mouth daily   Gael Christensen NP   nitroGLYcerin (NITROSTAT) 0.4 MG sublingual tablet For chest pain place 1 tablet under the tongue every 5 minutes for 3 doses. If symptoms persist 5 minutes after 1st dose call 911.   Davion Perez PA-C   pantoprazole (PROTONIX) 40 MG EC tablet TAKE 1 TABLET BY MOUTH EVERY MORNING BEFORE BREAKFAST   Davion Cordova PA-C   triamcinolone (KENALOG) 0.1 % external cream Apply topically 2 times daily   Davion Cordova PA-C        ALLERGIES:   No known allergies    SOCIAL HISTORY:   Social History     Socioeconomic History     Marital status:      Spouse name: Not on file     Number of children: Not on file     Years of education: Not on file     Highest education level: Not on file   Occupational History     Not on file   Tobacco Use     Smoking status: Former Smoker     Packs/day: 1.00     Years: 25.00     Pack years: 25.00     Types: Cigarettes     Start date:      Quit date: 2005     Years since quittin.4     Smokeless tobacco: Never Used   Substance and Sexual Activity     Alcohol use: Yes     Alcohol/week: 4.0 standard drinks     Types: 4 Shots of liquor per week     Comment: OCCASSIONALLY      Drug use: No     Sexual activity: Yes     Partners: Female   Other Topics Concern     Parent/sibling w/ CABG, MI or angioplasty before 65F 55M? Yes      Service Not Asked     Blood Transfusions Not Asked     Caffeine Concern No     Comment: 3 cups daily     Occupational Exposure Not Asked     Hobby Hazards Not Asked     Sleep Concern Not Asked     Stress Concern Not Asked     Weight Concern Not Asked     Special Diet Yes     Comment: watching carb intake     Back Care Not Asked     Exercise No     Comment: some walking     Bike Helmet Not Asked     Seat Belt Not Asked     Self-Exams Not Asked   Social History Narrative     Not on file     Social Determinants of Health     Financial Resource Strain: Not on file   Food Insecurity: Not on file   Transportation Needs: Not on file   Physical Activity: Not on file   Stress: Not on file   Social Connections: Not on file   Intimate Partner Violence: Not on file   Housing Stability: Not on file     No smoking    FAMILY HISTORY:  Family History   Problem Relation Age of Onset     Cancer - colorectal Sister      Thyroid Disease Brother      Cardiovascular Brother      Diabetes Brother      Cancer Father         tumor in chest and throat     Arthritis Mother      Thyroid  "Disease Mother      Diabetes Mother      Glaucoma No family hx of      Macular Degeneration No family hx of        Patient denies known family history of bleeding, clotting, or anesthesia related complications.     REVIEW OF SYSTEMS:   A 10-point reviews of systems was negative except as noted above in the HPI.     PHYSICAL EXAM:   Vitals:    01/04/22 1301 01/04/22 1904   BP: 119/60 117/77   Pulse: 84 63   Resp: 16 16   Temp: 97  F (36.1  C)    TempSrc: Temporal    SpO2: 99% 100%   Weight: 117.5 kg (259 lb)    Height: 1.93 m (6' 4\")      General: Awake, alert, appropriate, following commands, NAD.  Neuro: CN II-XII grossly intact.   Skin: See MSK  HEENT: Atraumatic  Lungs: Breathing comfortably and nonlabored, no wheezes or stridor noted.  Heart/Cardiovascular: Regular pulse, no peripheral cyanosis.    Left Lower Extremity:   Redness and swelling of the great toe. There is a small wound to the lateral aspect of the distal phalanx with questionable purulent drainage and surrounding bogginess. There additionally is a pressure wound to the lateral aspect of the great toe - see media tab for photo representation  Previous partial amputations of the 2nd and 3rd toes  Diminished sensation throughout the foot in all distributions  No pain with ROM of the toe  DP/PT pulses palpable, 2+, toes warm and well perfused.     LABS:  Hemoglobin   Date Value Ref Range Status   01/04/2022 10.3 (L) 13.3 - 17.7 g/dL Final   12/29/2021 9.7 (L) 13.3 - 17.7 g/dL Final   05/09/2021 10.7 (L) 13.3 - 17.7 g/dL Final   05/08/2021 10.9 (L) 13.3 - 17.7 g/dL Final     WBC   Date Value Ref Range Status   05/09/2021 5.9 4.0 - 11.0 10e9/L Final     WBC Count   Date Value Ref Range Status   01/04/2022 4.8 4.0 - 11.0 10e3/uL Final     Platelet Count   Date Value Ref Range Status   01/04/2022 249 150 - 450 10e3/uL Final   05/09/2021 202 150 - 450 10e9/L Final     INR   Date Value Ref Range Status   05/08/2021 1.23 (H) 0.86 - 1.14 Final     Creatinine "   Date Value Ref Range Status   01/04/2022 1.18 0.66 - 1.25 mg/dL Final   05/17/2021 1.30 (H) 0.66 - 1.25 mg/dL Final     Glucose   Date Value Ref Range Status   01/04/2022 144 (H) 70 - 99 mg/dL Final   05/17/2021 272 (H) 70 - 99 mg/dL Final     CRP: 24    IMAGING:  XR of the great toe: questionable erosion dorsal lateral aspect of the distal phalanx. No fracture    IMPRESSION:   Jayro Elder is a 71 year old male with diabetes, peripheral neuropathy, coronary artery disease presenting with a left great toe infection.  There appeared to be abscess along the lateral aspect of the distal toe.  This was opened at bedside and probed to bone.  This is therefore concerning for osteomyelitis given the erosion seen on x-ray.  Culture swab sent.    RECOMMENDATIONS:   - Admit to Medicine.  - Plan for OR: none at this time, plan for treatment with IV antibiotics, should this fail would consider possible amputation at that time  - Anticoagulation/DVT Prophylaxis: continue PTA  - Antibiotics/Tetanus: per primary team  - X-rays/Imaging: complete  - Activity: OK for out of bed activity  - Weight bearing: OK to weight bear through heel; please obtain hard soled shoe  - Pain control: per primary team  - Diet: OK for diet  - Follow-up: TBD  - Disposition: floor    Assessment and Plan discussed with Dr. Medina, Orthopaedic Surgery Staff.     Freddy Wong MD 1/4/2022 9:49 PM  Orthopaedic Surgery Resident, PGY-4

## 2022-01-05 NOTE — CONSULTS
[facility]  General Infectious Disease Initial Consult Note- [facility] Team     Patient:  Jayro Elder  YOB: 1950  MRN: [id number]  Date of Visit:  January 5, 2022  Consult Requested by: Dr. Mcpherson  Reason for Consult: 70 yo with hx of T2DM, PVD, CAD, osteomyelitis s/p multiple partial toe amputations, admitted for possible osteo of left great toe         Assessment and Recommendations:     Problem List:  1. Osteomyelitis and cellulitis of the L great toe  2. Type 2 diabetes with prior history of multiple diabetic foot infections and osteomyelitis necessitating toe amputations  3. Severe diabetic neuropathy    This patient is unfortunately readmitted for L great toe cellulitis and osteomyelitis. He was recently evaluated by vascular surgery at [facility] on 12/9 and was not found to have any open ulcers/wounds and workup for PAD was negative. He did have a callus removed from his L great toe at that time without underlying ulcer, though a pit was noted. His infection appears to have worsened since his recent admission to [facility], despite oral keflex regimen. No systemic symptoms per the patient.    Clinical examination revealed ulceration to bone and imaging confirmed findings of osteomyelitis with bony erosion of the base of the distal phalanx of L great toe,. No further MRI imaging is needed. Wound cultures were obtained, though unsure of yield as the patient has been on antibiotics since recent hospitalization. At this time, it is appropriate to continue broad spectrum antibiotics until cultures speciate.      Recommendations:  - Continue IV vancomycin and IV Zosyn for now. Will likely narrow to oral regimen with high bioavailability pending culture results tomorrow  - Agree with orthopedic consult, appreciate assessment on necessity of debridement  - Strongly recommend close outpatient podiatry follow up after discharge  - Monitor CBC and fever curve  - Of note,  patient was informed of recent COVID-19 positive result of a family member in his household. Monitor closely for symptoms. Initial COVID/Influenza PCR screen on admission was negative    ID Will continue to follow , please page with questions of if situation arises where we may be of assistance    Signed:  Pam Up MD , 01/05/22   PGY-5 Medicine-Dermatology  Pager [phone]  For more paging info see [employer]->MEDICINE INFECTIOUS DISEASE ADULT/[facility]              Antimicrobials/Immunomodulatory:     IV pip-tazo 1/4 - present  IV vancomycin 1/4 - present       Microbiologic Data:     Wound culture 1/4/22: 4+ WBC on gram stain  Blood cx 1/5 NGTD    Prior bone culture from 5/2021 grew MSSA from L 3rd toe, prior to amputation         History of Present Illness:     Jayro Elder is a 71 year old male with a PMH of Type 2 diabetes c/b multiple diabetic foot infections/multiple toe amputations and severe neuropathy, CAD s/p PCI and CABG 5/2019, HFpEF, mitral valve replacement (6/6/12), atrial fibrillation on warfarin, COPD with bronchiectasis and CKD3 who is admitted for L great toe cellulitis and osteomyelitis.    He has a prior history of osteomyelitis of multiple toes related to diabetic foot infections, requiring amputation. Most recent amputation was in 5/2021 for 2nd-3rd toes on the L foot for MSSA osteomyelitis. He was recently discharged from [facility] following hospitalization from 12/27-12/29 for unstable angina. He was noted to have erythema of the L great toe without swelling or warmth. XR of the L toe showed mild irregularity of the medial aspect of the head of 1st MTP, thought to be chronic erosive change. He was treated empirically with IV ancef and discharged home with 6 days of keflex 500 mg QID. He reports his toe became increasing erythematous, swollen, and started to have lena purulent drainage from both sides, prompting ED evaluation. He denies any concurrent fever, chills, fatigue, night sweats.  No redness on the dorsum of his foot or ankle pain. He has no sensation in his feet. Bedside orthopedic evaluation shows probe in wound goes to bone.    Patient saw vascular surgery at [facility] on 12/9 for follow up. No open wounds noted during that visit. Callus on the L great toe with underlying bruising was noted and removed in office. No underlying ulcer seen. They recommended continued podiatric care. lower extremity arterial studies showed normal ABIs/TBIs, normal transcutaneous oximetry.    Of note, the patient reports one of his grandchildren just tested positive for COVID-19 today. He lives at home with his wife, son, and 3 grandchildren so he has been exposed.       Review of Systems:     The following systems were reviewed with the patient as they pertain to the case and are negative unless noted here or above in the HPI.        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     Cardiomyopathy (H)      Cellulitis of left lower extremity 3/13/2018     Cerebral infarction (H)     eye sight decreased in peripheral of right side and blurry     Cerebral infarction (H) 2016     Diabetic ulcer of left midfoot associated with type 2 diabetes mellitus, with fat layer exposed (H) 3/13/2018     Essential hypertension 11/13/2002     Essential hypertension, benign 11/13/2002     Generalized osteoarthrosis, unspecified site 11/13/2002     Lightheadedness 12/27/2021     Microalbuminuria 3/13/2018    X1     Myocardial infarction (H)      Myocardial infarction (H) 06/28/2005     Neuropathy      Neuropathy 2016     Sepsis (H)       Sleep apnea     doesn't use it     Sleep apnea 2006     Substance abuse (H)      Substance abuse (H)      Syncope, unspecified syncope type 3/13/2018     Syncope, unspecified syncope type 03/13/2018     Thyroid disease     takes medicine     Thyroid disease 2005     Tobacco use disorder 11/13/2002     Type 2 diabetes mellitus (H) 2000     Type 2 diabetes mellitus with diabetic peripheral angiopathy without gangrene, unspecified long term insulin use status 2005    PastSurgical History  Past Surgical History:   Procedure Laterality Date     AMPUTATE TOE(S) Right 1/6/2019    Procedure: Right open second toe partial amputation;  Surgeon: Sabino Garcia DPM;  Location: [facility] OR     AMPUTATE TOE(S) Right 1/8/2019    Procedure: 1.  Revision right second toe amputation with resection of distal half of proximal phalanx with full closure.    2.  Debridement of callus/preulcerative lesion, distal left second toe and plantar left first metatarsophalangeal joint.;  Surgeon: Ryan Bhagat DPM;  Location: [facility] OR     AMPUTATE TOE(S) Right 3/12/2019    Procedure: Partial right fourth toe amputation.;  Surgeon: Ryan Bhagat DPM;  Location: [facility] OR     AMPUTATE TOE(S) Right 5/6/2019    Procedure: Right partial third toe amputation;  Surgeon: Татьяна Mckeon DPM, Podiatry/Foot and Ankle Surgery;  Location: [facility] OR     AMPUTATE TOE(S) Left 4/18/2020    Procedure: LEFT SECOND TOE AMPUTATION;  Surgeon: Ryan Bhagat DPM;  Location: [facility] OR     AMPUTATE TOE(S) Left 5/8/2021    Procedure: 1.  Amputation of the left third toe at the level of the proximal interphalangeal joint. 2.  Excisional debridement of less than 20 square cm down to the level of the deeper skin, plantar left foot.;  Surgeon: Kwasi Root DPM;  Location: [facility] OR     AMPUTATE TOE(S) Right 2019    4 toes amputation      ANGIOGRAM  6/29/05    subtotal occ.mid LAD,SUSAN mid LAD     ANGIOGRAM  7/05    mild CAD,patent stent,no flow-limiting  lesions,sev.LV dysf.LAD enlarged     ANGIOGRAM  2/09    Sev.single vessel disease,Mod LV dysf.distal LAD 90%,70-75% mid lad just before prev stent,SUSAN to prox.mid LAD, endeavor to distal LAD     ANGIOGRAM  11/13/13    restenosis, stent LAD     BACK SURGERY      back surgery x3     CARDIAC CATHETERIZATION  07/08/2019     CARDIAC CATHETERIZATION Left 06/13/2017     CARDIAC CATHETERIZATION  2005,2009,20013     CORONARY STENT PLACEMENT  07/08/2019     CV CORONARY ANGIOGRAM N/A 7/8/2019    Procedure: Coronary Angiogram;  Surgeon: Jose Alfredo Crockett MD;  Location:  HEART CARDIAC CATH LAB     CV CORONARY ANGIOGRAM N/A 4/9/2021    Procedure: CV CORONARY ANGIOGRAM;  Surgeon: Warren Paulson MD;  Location: [facility] CARDIAC CATH LAB     CV HEART CATHETERIZATION WITH POSSIBLE INTERVENTION N/A 12/28/2021    Procedure: Coronary Angiogram;  Surgeon: Jose Alfredo Crockett MD;  Location:  HEART CARDIAC CATH LAB     CV INSTANTANEOUS WAVE-FREE RATIO N/A 12/28/2021    Procedure: Instantaneous Wave-Free Ratio;  Surgeon: Jose Alfredo Crockett MD;  Location:  HEART CARDIAC CATH LAB     CV LEFT HEART CATH N/A 7/8/2019    Procedure: Left Heart Cath;  Surgeon: Jose Alfredo Crockett MD;  Location:  HEART CARDIAC CATH LAB     CV LEFT HEART CATH N/A 12/28/2021    Procedure: Left Heart Cath;  Surgeon: Jose Alfredo Crockett MD;  Location:  HEART CARDIAC CATH LAB     CV LEFT VENTRICULOGRAM N/A 12/28/2021    Procedure: Left Ventriculogram;  Surgeon: Jose Alfredo Crockett MD;  Location:  HEART CARDIAC CATH LAB     CV PCI ASPIRATION THROMECTOMY N/A 7/8/2019    Procedure: PCI Aspiration Thrombectomy;  Surgeon: Jose Alfredo Crockett MD;  Location:  HEART CARDIAC CATH LAB     CV PCI STENT DRUG ELUTING N/A 7/8/2019    Procedure: PCI Stent Drug Eluting;  Surgeon: Jose Alfredo Crockett MD;  Location:  HEART CARDIAC CATH LAB     HEART CATH LEFT HEART CATH  06/13/2017    SUSAN to OM3     INCISION AND DRAINAGE TOE, COMBINED Bilateral 9/11/2018    Procedure:  COMBINED INCISION AND DRAINAGE TOE;  1) Irrigation and debridement ulcer left great toe  2) Amputation 3rd toe right foot at distal interphalangeal joint level;  Surgeon: Miki Harp MD;  Location: [facility] OR     IRRIGATION AND DEBRIDEMENT FOOT, COMBINED Left 3/12/2019    Procedure: Debridement of left foot ulcer sub first MPJ 2 x 2.5 cm down to and including subcutaneous tissue, callus debridement for preulcerative lesion, left second toe.;  Surgeon: Ryan Bhagat DPM;  Location:  OR     ORTHOPEDIC SURGERY      bunion surgery both feet     ORTHOPEDIC SURGERY      left shoulder     OTHER SURGICAL HISTORY  9 years old    cancer tumor mouth     SHOULDER ARTHROSCOPY W/ ROTATOR CUFF REPAIR Left 2004     SOFT TISSUE SURGERY      debridement of toe numerous time     VASCULAR SURGERY      7 stents in heart     VASCULAR SURGERY       [facility] NONSPECIFIC PROCEDURE      Laminectomy x 3 - (1983 x 2 & 1990)     [facility] NONSPECIFIC PROCEDURE Bilateral 1998    Bunionectomy     [facility] NONSPECIFIC PROCEDURE  1959    Gingival surgery at age 9      Family History  Family History   Problem Relation Age of Onset     Cancer - colorectal Sister      Thyroid Disease Brother      Cardiovascular Brother      Diabetes Brother      Cancer Father         tumor in chest and throat     Arthritis Mother      Thyroid Disease Mother      Diabetes Mother      Glaucoma No family hx of      Macular Degeneration No family hx of     Social History   reports that he quit smoking about 16 years ago. His smoking use included cigarettes. He started smoking about 42 years ago. He has a 25.00 pack-year smoking history. He has never used smokeless tobacco. He reports current alcohol use of about 4.0 standard drinks of alcohol per week. He reports that he does not use drugs.  He   Notable Exposures  Denies any pets at home  Wears socks around the house, no shoes/slippers  Denies any recent trauma  Sees podiatry for foot care Vaccination  "History:  Immunization History   Administered Date(s) Administered     COVID-19,PF,Moderna 03/09/2021, 04/06/2021     Flu, Unspecified 11/06/2006, 10/21/2008     Influenza (H1N1) 01/29/2010     Influenza (High Dose) 3 valent vaccine 01/06/2020     Influenza (IIV3) PF 11/02/2007, 11/17/2009     Influenza Vaccine IM > 6 months Valent IIV4 (Alfuria,Fluzone) 11/14/2013     Pneumo Conj 13-V (2010&after) 06/17/2017     Pneumococcal 23 valent 06/26/2019     TDAP Vaccine (Adacel) 06/23/2017             Physical Exam:     VITAL SIGNS:  Blood pressure (!) 147/84, pulse 65, temperature 97  F (36.1  C), temperature source Temporal, resp. rate 18, height 1.93 m (6' 4\"), weight 117.5 kg (259 lb), SpO2 100 %.     No intake or output data in the 24 hours ending 01/05/22 0920    GENERAL APPEARANCE: Not in acute distress    PHYSICAL EXAM:  Eyes:     extraocular eye movements grossly intact, no ptosis, no discharge, no scleral icterus  Mouth, Throat:     mucous membranes moist, pharynx normal without lesions  Cardiovascular:    Auscultation:  S1, S2 normal, regular rate and rhythm  Respiratory:     Inspection: Not in respiratory distress, Chest expansion symmetrical   Auscultation: 4 point auscultation done clear to auscultation bilaterally, no wheezes, no rales and no rhonchi  Gastrointestinal:      soft, non-tender; bowel sounds normal; no masses,  no organomegaly  Musculoskeletal:     ROM intact in all 4 limbs. No ankle swelling. Notable edema of the L great toe. Amputation noted of the L 2nd/3rd toes digits, and R 2nd-4th toes at the MCP  Skin:     On the L great toe, there is significant erythema, edema, and fluctuance with hemorrhagic crust and purulence noted on the bilateral aspect of the toe. Significant paronychia is seen on the L great toe. No involvement of surrounding tissue, or extension of erythema to the dorsal foot. Large thick callus is present on the plantar aspect of the L foot near the ball of the foot. Poor " sensation of the bilateral feet extending to mid calves.  Neurologic:     Higher Mental Function: Conversant, AOx4   Motor: Moving all 4 limbs in bed   Sensory: crude touch intact in all 4 limbs  Psychiatric:     normal    Access: PIV                 Laboratory Data:     LABORATORY STUDIES:   I have reviewed labs/blood-work done until this point in the patients encounter. The specific values are recorded in Epic, and I have incorporated them and my interpretation as relevant into my assessment and plan as recorded     IMAGING / DIAGNOSTIC STUDIES   I have reviewed all radiology reports/EKGs/ and other diagnostic studies that are part of this patients present encounter.   The specific reports are available in Epic and I have incorporated them into my assessment and plan as recorded    EKG QTc 428        Imaging(selected):     XR L Toe 12/27  EXAM: XR TOE LEFT G/E 2 VIEWS                                                 IMPRESSION: Soft tissue swelling of the great toe. No evidence of acute fracture. Mild to moderate first MTP degenerative changes. Mild irregularity of the medial aspect of the head of the first metatarsal could be chronic erosive change. Recommend clinical correlation for history of gout.     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     No acute lytic or destructive lesions. Moderate midfoot degenerative changes.    XR L toe 1/4  IMPRESSION:   1. There is an erosion of the dorsal aspect of the articular surface of the base of the distal phalanx of the great toe seen on the lateral view. This would raise the question of osteomyelitis/septic joint in the appropriate clinical setting. MRI could   further evaluate if clinically warranted.  2. Soft tissue swelling in the great toe.  3. Partial amputation of the second and third toes. Moderate osteoarthrosis at the first MTP joint.    XR L foot 1/4  IMPRESSION: Amputation of the distal end of the 2nd and 3rd toes. Soft tissue swelling in the  great toe. Mild to moderate multifocal osteoarthrosis. Plantar calcaneal enthesophyte. Calcifications in the region of the plantar fascia. Arterial   calcifications. Small osteochondroma of the dorsal aspect of the base of the proximal phalanx of the great toe again noted. Otherwise negative. No radiographic evidence of osteomyelitis.

## 2022-01-05 NOTE — UTILIZATION REVIEW
"  Admission Status; Secondary Review Determination     Admission Date: 1/4/2022  3:19 PM      Under the authority of the Utilization Management Committee, the utilization review process indicated a secondary review on the above patient.  The review outcome is based on review of the medical records, discussions with staff, and applying clinical experience noted on the date of the review.        (x)      Inpatient Status Appropriate - This patient's medical care is consistent with medical management for inpatient care and reasonable inpatient medical practice.      () Observation Status Appropriate - This patient does not meet hospital inpatient criteria and is placed in observation status. If this patient's primary payer is Medicare and was admitted as an inpatient, Condition Code 44 should be used and patient status changed to \"observation\".   () Admission Status NOT Appropriate - This patient's medical care is not consistent with medical management for Inpatient or Observation Status.          RATIONALE FOR DETERMINATION   Jayro Elder is a 71 year old male who has a PMH notable for CVA, CAD and prior MI w/ CHF with cardiomyopathy, HTN, DM2 w/ peripheral neuropathy, diabetic foot infection and prior LLE cellulitis, osteomyelitis, prior bacteremia, lightheadedness and prior syncope presentations, CKD, and sleep apnea.  He presented to the emergency room with left first toe increasing pain and noted skin changes.  Found to have left first toe osteomyelitis.  Requiring antibiotics with IV vancomycin and Zosyn.  He was initially placed in the hospital on observation status.  He is expected to require a prolonged hospital stay for continued IV antibiotics.  Due to this patient's advanced age, complex past medical history, left first toe osteomyelitis with need for IV vancomycin and Zosyn, and expectation of a prolonged hospital stay, it is appropriate to admit this patient to the hospital as an inpatient.      The " severity of illness, intensity of service provided, expected LOS and risk for adverse outcome make the care complex, high risk and appropriate for hospital admission.        The information on this document is developed by the utilization review team in order for the business office to ensure compliance.  This only denotes the appropriateness of proper admission status and does not reflect the quality of care rendered.         The definitions of Inpatient Status and Observation Status used in making the determination above are those provided in the CMS Coverage Manual, Chapter 1 and Chapter 6, section 70.4.      Sincerely,     Xiang Baumann D.O.  Utilization Review/ Case Management  [facility].

## 2022-01-05 NOTE — PLAN OF CARE
ED PT: Pt adamantly refusing PT assessment. Educated on recommendation for L heel weightbearing with shoe to offload L great toe wound though pt refuses and states he will be unable to adhere to this 2/2 neuropathy and inability to feel if he is walking on his heel or toes. Pt has L offloading shoe here though it appears quite worn, would benefit from new shoe. Pt continues to refuse PT assessment despite education and encouragement. Will complete orders, re-consult if pt becomes agreeable.

## 2022-01-05 NOTE — PROGRESS NOTES
"S: Pt seen at [facility] in [facility] in good spirits and reports that he has an old DH2 Shoe the same as he was getting today. O: I see the EPIC order for the post op or offloading shoe to LT with weight bearing. I see he is 6'4\" and 259#. He measured XL without toe cap. A: He was fit with the DH2 shoe and the toe cap was removed since his toe did not fit into it and could rub on his toe. Pegs were also removed to off load the 1st met head and toe while weight bearing. Instructions were given and he seemed to understand since he already had one. I cut the strap to length and he thanked me for my time and the shoe. P: DEMETRIUS PRN. G: The goal is to reduce weight bearing on the LT great toe.  Electronically signed Venu Griffin CPO, LPO.  "

## 2022-01-05 NOTE — PROGRESS NOTES
Orthopaedic Surgery Progress Note 01/05/2022    S: No acute events overnight.  Pain stable. No fevers. No new concerns    O:  Temp: 97  F (36.1  C) Temp src: Temporal BP: 117/77 Pulse: 63   Resp: 16 SpO2: 100 % O2 Device: None (Room air)      Exam:  Gen: No acute distress, resting comfortably in bed.  Resp: Non-labored breathing  MSK:  LLE:  - Dressing to toe c/d/i    Recent Labs   Lab 01/04/22  1541 12/29/21  0624   WBC 4.8 5.4   HGB 10.3* 9.7*    167   CRP 24.0*  --      Culture from wound swab: no organisms, 4+ PMNs    Assessment: aJyro Elder is a 71 year old male with diabetes, peripheral neuropathy, coronary artery disease presenting with a left great toe infection, wound that probes to bone, and xrays with tiny cortical erosion. Overall picture is consistent with osteomyelitis of the left great toe.     RECOMMENDATIONS:   - Admit to Medicine.  - Recommend wound care consult given pressure wound between toes. OK to remove bandage placed over toe in ED.  - Plan for OR: none at this time, plan for treatment with IV antibiotics, should this fail would consider possible amputation at that time  - Anticoagulation/DVT Prophylaxis: continue  - Antibiotics/Tetanus: per primary team  - X-rays/Imaging: complete  - Activity: OK for out of bed activity  - Weight bearing: OK to weight bear through heel; please obtain hard soled shoe prior to walking  - Pain control: per primary team  - Diet: OK for diet  - Follow-up: TBD  - Disposition: floor     Assessment and Plan discussed with Dr. Medina, Orthopaedic Surgery Staff.     Freddy Wong MD  Orthopaedic Surgery PGY-4

## 2022-01-06 ENCOUNTER — HOME INFUSION (PRE-WILLOW HOME INFUSION) (OUTPATIENT)
Dept: PHARMACY | Facility: CLINIC | Age: 72
End: 2022-01-06
Payer: COMMERCIAL

## 2022-01-06 LAB
ANION GAP SERPL CALCULATED.3IONS-SCNC: 5 MMOL/L (ref 3–14)
BUN SERPL-MCNC: 21 MG/DL (ref 7–30)
CALCIUM SERPL-MCNC: 9.3 MG/DL (ref 8.5–10.1)
CHLORIDE BLD-SCNC: 104 MMOL/L (ref 94–109)
CO2 SERPL-SCNC: 28 MMOL/L (ref 20–32)
CREAT SERPL-MCNC: 1.27 MG/DL (ref 0.66–1.25)
ERYTHROCYTE [DISTWIDTH] IN BLOOD BY AUTOMATED COUNT: 13.5 % (ref 10–15)
GFR SERPL CREATININE-BSD FRML MDRD: 60 ML/MIN/1.73M2
GLUCOSE BLD-MCNC: 189 MG/DL (ref 70–99)
GLUCOSE BLDC GLUCOMTR-MCNC: 167 MG/DL (ref 70–99)
GLUCOSE BLDC GLUCOMTR-MCNC: 177 MG/DL (ref 70–99)
GLUCOSE BLDC GLUCOMTR-MCNC: 201 MG/DL (ref 70–99)
GLUCOSE BLDC GLUCOMTR-MCNC: 202 MG/DL (ref 70–99)
HCT VFR BLD AUTO: 30.7 % (ref 40–53)
HGB BLD-MCNC: 10 G/DL (ref 13.3–17.7)
MAGNESIUM SERPL-MCNC: 2.2 MG/DL (ref 1.6–2.3)
MCH RBC QN AUTO: 29.5 PG (ref 26.5–33)
MCHC RBC AUTO-ENTMCNC: 32.6 G/DL (ref 31.5–36.5)
MCV RBC AUTO: 91 FL (ref 78–100)
PLATELET # BLD AUTO: 242 10E3/UL (ref 150–450)
POTASSIUM BLD-SCNC: 3.8 MMOL/L (ref 3.4–5.3)
RBC # BLD AUTO: 3.39 10E6/UL (ref 4.4–5.9)
SODIUM SERPL-SCNC: 137 MMOL/L (ref 133–144)
WBC # BLD AUTO: 4.8 10E3/UL (ref 4–11)

## 2022-01-06 PROCEDURE — 120N000002 HC R&B MED SURG/OB UMMC

## 2022-01-06 PROCEDURE — 83735 ASSAY OF MAGNESIUM: CPT | Performed by: STUDENT IN AN ORGANIZED HEALTH CARE EDUCATION/TRAINING PROGRAM

## 2022-01-06 PROCEDURE — 99232 SBSQ HOSP IP/OBS MODERATE 35: CPT | Mod: GC | Performed by: INTERNAL MEDICINE

## 2022-01-06 PROCEDURE — 250N000011 HC RX IP 250 OP 636: Performed by: EMERGENCY MEDICINE

## 2022-01-06 PROCEDURE — 82310 ASSAY OF CALCIUM: CPT | Performed by: STUDENT IN AN ORGANIZED HEALTH CARE EDUCATION/TRAINING PROGRAM

## 2022-01-06 PROCEDURE — 250N000013 HC RX MED GY IP 250 OP 250 PS 637

## 2022-01-06 PROCEDURE — 36415 COLL VENOUS BLD VENIPUNCTURE: CPT | Performed by: STUDENT IN AN ORGANIZED HEALTH CARE EDUCATION/TRAINING PROGRAM

## 2022-01-06 PROCEDURE — G0463 HOSPITAL OUTPT CLINIC VISIT: HCPCS

## 2022-01-06 PROCEDURE — 250N000011 HC RX IP 250 OP 636

## 2022-01-06 PROCEDURE — 85014 HEMATOCRIT: CPT | Performed by: STUDENT IN AN ORGANIZED HEALTH CARE EDUCATION/TRAINING PROGRAM

## 2022-01-06 RX ORDER — AMOXICILLIN 250 MG
2 CAPSULE ORAL 2 TIMES DAILY
Status: DISCONTINUED | OUTPATIENT
Start: 2022-01-06 | End: 2022-01-07 | Stop reason: HOSPADM

## 2022-01-06 RX ORDER — POLYETHYLENE GLYCOL 3350 17 G/17G
17 POWDER, FOR SOLUTION ORAL DAILY
Status: DISCONTINUED | OUTPATIENT
Start: 2022-01-06 | End: 2022-01-07 | Stop reason: HOSPADM

## 2022-01-06 RX ADMIN — LEVOTHYROXINE SODIUM 137 MCG: 0.14 TABLET ORAL at 21:20

## 2022-01-06 RX ADMIN — APIXABAN 5 MG: 5 TABLET, FILM COATED ORAL at 08:58

## 2022-01-06 RX ADMIN — VANCOMYCIN HYDROCHLORIDE 1000 MG: 1 INJECTION, SOLUTION INTRAVENOUS at 10:11

## 2022-01-06 RX ADMIN — VANCOMYCIN HYDROCHLORIDE 1000 MG: 1 INJECTION, SOLUTION INTRAVENOUS at 21:21

## 2022-01-06 RX ADMIN — CLOPIDOGREL BISULFATE 75 MG: 75 TABLET ORAL at 08:58

## 2022-01-06 RX ADMIN — FUROSEMIDE 40 MG: 40 TABLET ORAL at 08:58

## 2022-01-06 RX ADMIN — PANTOPRAZOLE SODIUM 40 MG: 40 TABLET, DELAYED RELEASE ORAL at 08:58

## 2022-01-06 RX ADMIN — ATORVASTATIN CALCIUM 40 MG: 40 TABLET, FILM COATED ORAL at 21:21

## 2022-01-06 RX ADMIN — ISOSORBIDE MONONITRATE 60 MG: 30 TABLET, EXTENDED RELEASE ORAL at 08:57

## 2022-01-06 RX ADMIN — CARVEDILOL 25 MG: 25 TABLET, FILM COATED ORAL at 17:40

## 2022-01-06 RX ADMIN — FUROSEMIDE 40 MG: 40 TABLET ORAL at 21:20

## 2022-01-06 RX ADMIN — PIPERACILLIN AND TAZOBACTAM 3.38 G: 3; .375 INJECTION, POWDER, FOR SOLUTION INTRAVENOUS at 08:59

## 2022-01-06 RX ADMIN — LISINOPRIL 2.5 MG: 2.5 TABLET ORAL at 08:58

## 2022-01-06 RX ADMIN — INSULIN GLARGINE 20 UNITS: 100 INJECTION, SOLUTION SUBCUTANEOUS at 09:00

## 2022-01-06 RX ADMIN — PIPERACILLIN AND TAZOBACTAM 3.38 G: 3; .375 INJECTION, POWDER, FOR SOLUTION INTRAVENOUS at 13:07

## 2022-01-06 RX ADMIN — CARVEDILOL 25 MG: 25 TABLET, FILM COATED ORAL at 08:58

## 2022-01-06 RX ADMIN — PIPERACILLIN AND TAZOBACTAM 3.38 G: 3; .375 INJECTION, POWDER, FOR SOLUTION INTRAVENOUS at 02:25

## 2022-01-06 RX ADMIN — PIPERACILLIN AND TAZOBACTAM 3.38 G: 3; .375 INJECTION, POWDER, FOR SOLUTION INTRAVENOUS at 21:19

## 2022-01-06 RX ADMIN — APIXABAN 5 MG: 5 TABLET, FILM COATED ORAL at 21:20

## 2022-01-06 ASSESSMENT — ENCOUNTER SYMPTOMS
COLOR CHANGE: 1
SHORTNESS OF BREATH: 0
RESPIRATORY NEGATIVE: 1
NECK PAIN: 0
DIAPHORESIS: 0
LIGHT-HEADEDNESS: 0
FATIGUE: 0
FEVER: 0
WOUND: 1
CHILLS: 0
WEAKNESS: 0
EYES NEGATIVE: 1
HEADACHES: 0
NECK STIFFNESS: 0
COUGH: 0
GASTROINTESTINAL NEGATIVE: 1
CARDIOVASCULAR NEGATIVE: 1

## 2022-01-06 ASSESSMENT — ACTIVITIES OF DAILY LIVING (ADL)
ADLS_ACUITY_SCORE: 8
DEPENDENT_IADLS:: TRANSPORTATION
ADLS_ACUITY_SCORE: 8

## 2022-01-06 NOTE — CONSULTS
Care Management Initial Consult    General Information  Assessment completed with: Patient,    Type of CM/SW Visit: Initial Assessment    Primary Care Provider verified and updated as needed: No (does not have one, and does not want one)   Readmission within the last 30 days:        Reason for Consult: discharge planning  Advance Care Planning: Advance Care Planning Reviewed: no concerns identified          Communication Assessment  Patient's communication style: spoken language (English or Bilingual)    Hearing Difficulty or Deaf: no   Wear Glasses or Blind: yes    Cognitive  Cognitive/Neuro/Behavioral: WDL                      Living Environment:   People in home: grandchild(ernesto),spouse     Current living Arrangements: house      Able to return to prior arrangements: yes       Family/Social Support:  Care provided by: self,spouse/significant other  Provides care for:    Marital Status:   Wife          Description of Support System: Supportive,Involved    Support Assessment: Adequate family and caregiver support,Adequate social supports    Current Resources:   Patient receiving home care services: No     Community Resources: None  Equipment currently used at home: cane, quad  Supplies currently used at home: Diabetic Supplies    Employment/Financial:  Employment Status:  (did not discuss)        Financial Concerns: No concerns identified           Lifestyle & Psychosocial Needs:  Social Determinants of Health     Tobacco Use: Medium Risk     Smoking Tobacco Use: Former Smoker     Smokeless Tobacco Use: Never Used   Alcohol Use: Not on file   Financial Resource Strain: Not on file   Food Insecurity: Not on file   Transportation Needs: Not on file   Physical Activity: Not on file   Stress: Not on file   Social Connections: Not on file   Intimate Partner Violence: Not on file   Depression: Not at risk     PHQ-2 Score: 0   Housing Stability: Not on file       Functional Status:  Prior to admission patient needed  assistance:   Dependent ADLs:: Independent (has cane, walker, stair lift if needed)  Dependent IADLs:: Transportation  Assesssment of Functional Status: At functional baseline    Mental Health Status:  Mental Health Status: No Current Concerns       Chemical Dependency Status:  Chemical Dependency Status: No Current Concerns             Values/Beliefs:  Spiritual, Cultural Beliefs, Roman [employer] Practices, Values that affect care: no               Additional Information:  Patient is a 71 year old male with history of T2DM, A fib, CAD, HTN, HLD, CKD, prior MI, CVA, who is admitted for worsening left great toe wound concerning for osteomyelitis.  Patient is currently requiring IV abx, ID is consulted.  Awaiting culture results.  Referral sent to [facility] to check benefits for coverage for home infusion if IV abx needed at discharge, awaiting response.  Met with patient at bedside to introduce RNCC role and discuss discharge planning.  Patient lives in [place] with his spouse and 3 grandchildren.  His wife assist him with his wound care.  He does not have a PCP that he follows with in the community and did not want to talk about getting set up with one.  Patient is hopeful they will be able to switch him to a po abx and discharge to home soon.  RNCC will continue to follow and assist with discharge planning as needed.     ADDENDUM: Received information from [facility] that patient does not have coverage for home infusion, would be self pay.       SEYMOUR Chan  Phone: [phone]  Pager: [phone]  To contact the weekend RNCC, Page: [phone]

## 2022-01-06 NOTE — PLAN OF CARE
"Neuro: A/O x4 calls appropriately. Reports numbness and tingling to U/LE per baseline.  Respiratory: on room air denies SOB  Cardiac: WDL denies chest pain.  Diet: moderate consistent carb tolerating with good appetite. Denies nausea/vomiting.  GI/: +BS BM this shift, voiding spontaneously not saving.  Skin: amputated toes to BLE, great big toe neuropathic ulcer.   IV Access: L PIV TKO.  Lab: WBC 4.8 K 3.8 , 202, 167.  Pain: reports discomfort to left great toe. Patient declined pain management.  VS: /66   Pulse 65   Temp 97.5  F (36.4  C) (Oral)   Resp 18   Ht 1.93 m (6' 4\")   Wt 117.5 kg (259 lb)   SpO2 96%   BMI 31.53 kg/m    Activity: up with SBA, ambulated to bathroom. Hard sole shoes at bed side, patient is encouraged to ambulate in hallway but continues to postpone walking will continue to encourage.  Plan: continue to monitor/assess toe. Encourage activity out of bed. Continue POC.       "

## 2022-01-06 NOTE — PLAN OF CARE
"/77 (BP Location: Right arm)   Pulse 69   Temp 97.5  F (36.4  C) (Oral)   Resp 18   Ht 1.93 m (6' 4\")   Wt 117.5 kg (259 lb)   SpO2 98%   BMI 31.53 kg/m        Neuros: Alert and orient x4. Baseline numbness and tingling. Denies any nausea.     Cardiac: WDL. Denies any chest pain.     Respiratory: WDL. On RA denies SOB.     GI/Gu: Voiding spontaneously. No BM this shift.     Skin/Incisions: no new skin deficits. PIV infusing TKO.      Pain: Minimal pain- denies pain medication.     Diet: CHO 60g.     Activity: Ind in room.     Plan: Continue with POC.     "

## 2022-01-06 NOTE — PROGRESS NOTES
Pt does not have coverage for iv abx in the home with their [employer] plan. Pt would be self-pay. Drug would be billed to the part D and supplies will be self-pay. Based on Vanco 1000mg q12h and Zosyn 3.375g q6h, total cost is $79.57 for drug and supplies per day.     Nursing is only covered if patient is homebound if not cost is $90.00 per visit if [facility] bills for it. Patient should have coverage in a TCU or infusion center. Let us know how patient would like to proceed.        ([facility]) In reference to admission date 01/04/2022 to check for iv abx coverage.        Please contact Intake with any questions, [phone] or In Basket pool, [facility] ([phone]).

## 2022-01-06 NOTE — CONSULTS
WO Nurse Inpatient Wound Assessment   Reason for consultation: Evaluate and treat L) great toe wounds    Assessment  L) great toe wounds due to Neuropathic Ulcer and with active infection  Status: initial assessment and evolving , erythema resolving, potential amputation in the future, refer to ortho team with further management.    Also has large amount of thick callus tissue built up on plantar aspect of 1st MTP head. Per pt he follows up with podiatry regularly and get debrided.   Treatment Plan  L) great toe wounds: Every other day and PRN      Cleanse the area with NS and pat dry.    Apply No sting film barrier to periwound skin.    Cover wound with Mepilex      Orders Written  Recommended provider order: Orthopedic consult already consulted  WO Nurse follow-up plan:signing off  Nursing to notify the Provider(s) and re-consult the WO Nurse if wound(s) deteriorates or new skin concern.    Patient History  According to provider note(s):   Jayro Elder is a 71 year old male with diabetes, peripheral neuropathy, coronary artery disease presenting with a left great toe infection, wound that probes to bone, and xrays with tiny cortical erosion. Overall picture is consistent with osteomyelitis of the left great toe. Cultures pending.    Objective Data  Containment of urine/stool: Continent of bladder and Continent of bowel    Active Diet Order  Orders Placed This Encounter      Combination Diet Moderate Consistent Carb (60 g CHO per Meal) Diet; Low Saturated Fat Na <2400mg Diet, No Caffeine Diet      Output:   I/O last 3 completed shifts:  In: 220 [P.O.:220]  Out: -     Risk Assessment:   Sensory Perception: 4-->no impairment  Moisture: 4-->rarely moist  Activity: 3-->walks occasionally  Mobility: 3-->slightly limited  Nutrition: 3-->adequate  Friction and Shear: 3-->no apparent problem  Wilmer Score: 20                          Labs: Recent Labs   Lab 01/06/22  0759 01/05/22  0631   ALBUMIN  --  2.9*   HGB  10.0* 10.3*   WBC 4.8 5.3   CRP  --  16.0*       Physical Exam  Areas of skin assessed: focused L) foot    Wound Location:  L) great toe        Date of last photo 1/4  Wound History: see above. Pt does have orthotic shoes    Wound Base: 100 % thick callus on plantar aspect on 1st MTP head, lateral toe, 2 bloody scab on medial aspect of the toe- suspect osteomylitis     Palpation of the wound bed: boggy and very edematous, somewaht indurated      Drainage: none- dried blood on old dressing     Description of drainage: none     Measurements (length x width x depth, in cm) 5x6x0.0cm callus on plantar, 2.5x1.2x0.0 on lateral aspect     Tunneling N/A     Undermining N/A  Periwound skin: intact, erythema- blanchable and indurated      Color: pale and pink      Temperature: normal   Odor: none  Pain: insensate, none  Pain intervention prior to dressing change:     Interventions  Visual inspection and assessment completed   Wound Care Rationale Protect periwound skin and Provide protection   Wound Care: done per plan of care  Supplies: floor stock and discussed with RN  Current off-loading measures: Pillows and orthotic consult in place  Current support surface: Standard  Atmos Air mattress  Education provided to: importance of repositioning, plan of care, wound progress and Off-loading pressure  Discussed plan of care with Patient    Kamla Cash RN

## 2022-01-06 NOTE — PROGRESS NOTES
INTERNAL MEDICINE   DAILY PROGRESS NOTE  [facility]    Jayro Elder ([id number])   Admission Date: 1/4/2022  Date of Service: 01/06/2022    Changes Today   - ID Consulted. Appreciate Recommendations   > Will discuss with ID today regarding whether we can transition to PO regimen despite no new culture data. Continue IV Vancomycin and Zosyn for now  - No surgical interventions per Ortho    Assessment & Plan   Jayor Elder is a 71 year old male who has a history of T2DM (A1c 8.5 12/28/21), A fib, CAD, HTN, HLD, CKD IIIa, CHF (EF 41% 12/28/21), prior MI, CVA, who is admitted for worsening left great toe wound concerning for osteomyelitis.     # Left 1st Toe Osteomyelitis  He was evaluated at [facility] by vascular 12/2021where they noted he had a callus over his left great toe with ecchymosis beneath that was bluntly removed with no blood or ulcer present underneath.  On 12/27, erythema was noted and he received IV Ancef before de-escalating to PO Keflex. He has history of left third toe osteomyelitis s/p amputation, excisional debridement in May 2021. Bone culture at that time grew MSSA, Finegoldia magna, Porphyromonas species. CRP 24.   - X-ray of left toe shows erosion concerning for osteomyelitis   - Ortho consulted, appreciate recs. No plan for OR at this time.   - Uric acid was mildly elevated at 9.1 during his last admission, 9.0 today.  No history of gout or concern for gout today  - Continue pain control with PTA gabapentin, Tylenol  - Blood cultures and wound cultures NGTD  - ID consulted, appreciate recs.    > Continue vanc/zosyn for now. Will discuss with ID regarding transition to PO regimen     # ICM (EF 41% on 12/28/21)  # Severe Pulmonary Hypertension  # HTN  Coronary angiogram performed on 12/28/2021 that showed no changes since April, with patent LAD, RCA, OM 3 stents with mild in-stent restenosis and no PCI.  No chest pain or shortness of breath today.  - Continue PTA lisinopril, carvedilol,  "Imdur, Lasix   - Continue PTA Plavix. ASA appears to have been restarted during last admission, but patient states he has not been taking it at home     # Atrial fibrillation (CHADSVASC 5)  - Rate controlled on exam, continue PTA carvedilol  - Continue PTA apixaban      # T2DM with peripheral neuropathy  - Continue gabapentin PRN   - PTA insulin regimen: lantus 44U qAM, aspart 12U breakfast, lunch, 14U dinner  - Due to decreased PO intake today, will order lantus 20U tomorrow, high intensity sliding scale insulin. Will likely need to increase dose in next few days     # Hypothyroidism: continue PTA levothyroxine  # GERD: continue PTA protonix        Diet:   Diabetic diet  DVT Prophylaxis: DOAC  Taylor Catheter: Not present  Fluids: None  Central Lines: None  Code Status:  Full     Seen and discussed with my attending physician, Dr. Benitez, who agrees with above assessment and plan.      Nathan Nath MD  Internal Medicine PGY-3  Pager #: [phone]      Subjective     Nursing note was reviewed, no acute event overnight. This morning, patient reports some throbbing pain in his left big toe. Otherwise, denies any fevers/chills, nausea/vomiting, chest pain, shortness of breath, abdominal pain or other discomfort. Frustrated that he's had a lot of issues with his toes in recent months.    Also reports that his whole family at home are currently dealing with symptomatic COVID. He states he has minimal interaction with his daughter who was the first to test positive prior to coming to hospital. Currently asymptomatic.    ROS negative except above.    Objective   Most recent vital signs:  BP (!) 153/73 (BP Location: Right arm)   Pulse 68   Temp 97.2  F (36.2  C) (Oral)   Resp 16   Ht 1.93 m (6' 4\")   Wt 117.5 kg (259 lb)   SpO2 97%   BMI 31.53 kg/m    Temp:  [96.8  F (36  C)-97.9  F (36.6  C)] 97.2  F (36.2  C)  Pulse:  [65-72] 68  Resp:  [16-18] 16  BP: (119-153)/(63-77) 153/73  SpO2:  [97 %-98 %] 97 %  Wt " Readings from Last 2 Encounters:   01/04/22 117.5 kg (259 lb)   12/29/21 117.8 kg (259 lb 12.8 oz)     No intake or output data in the 24 hours ending 01/05/22 1647    Physical exam:  Constitutional: awake, alert, cooperative, no apparent distress, and appears stated age  Eyes: PERRL, EOMI  ENT: Normocephalic, without obvious abnormality, atraumatic, oral pharynx with moist mucous membranes  Respiratory: No increased work of breathing, good air exchange, clear to auscultation bilaterally, no crackles or wheezing  Cardiovascular: Irregularly irregular. No M/R/G. DP/PT pulses 2+. Trace pitting edema in lower extremities.   GI: Normal bowel sounds, soft, obese, non-tender  Skin (per examination on 1/5/2022): Redness and swelling of left great toe with some discharge staining wound dressing on lateral aspect of toe. Previous partial amputations of left 2nd and 3rd toes. Ball of left foot with callus, no open wound. Normal ROM of toe, foot.   On right foot, history of 2-4th toes partial amputation.  Musculoskeletal: Full range of motion noted.  Motor strength is 5 out of 5 all extremities bilaterally.   Neurologic: Awake, alert, oriented to name, place and time.  Cranial nerves II-XII are grossly intact.  No focal neurologic deficits    Labs  CMP  Recent Labs   Lab 01/06/22  1303 01/06/22  0759 01/06/22  0234 01/05/22  2213 01/05/22  0823 01/05/22  0631 01/05/22  0016 01/04/22  1541   NA  --  137  --   --   --  138  --  138   POTASSIUM  --  3.8  --   --   --  3.3*  --  3.4   CHLORIDE  --  104  --   --   --  105  --  103   CO2  --  28  --   --   --  25  --  32   ANIONGAP  --  5  --   --   --  8  --  3   * 189* 201* 246*   < > 142*   < > 144*   BUN  --  21  --   --   --  20  --  21   CR  --  1.27*  --   --   --  1.14  --  1.18   GFRESTIMATED  --  60*  --   --   --  69  --  66   BETTIE  --  9.3  --   --   --  9.2  --  8.8   MAG  --  2.2  --   --   --   --   --   --    PROTTOTAL  --   --   --   --   --  7.0  --  7.6    ALBUMIN  --   --   --   --   --  2.9*  --  3.2*   BILITOTAL  --   --   --   --   --  1.0  --  0.9   ALKPHOS  --   --   --   --   --  105  --  122   AST  --   --   --   --   --  16  --  13   ALT  --   --   --   --   --  25  --  30    < > = values in this interval not displayed.     CBC  Recent Labs   Lab 01/06/22  0759 01/05/22  0631 01/04/22  1541   WBC 4.8 5.3 4.8   RBC 3.39* 3.47* 3.50*   HGB 10.0* 10.3* 10.3*   HCT 30.7* 31.0* 31.7*   MCV 91 89 91   MCH 29.5 29.7 29.4   MCHC 32.6 33.2 32.5   RDW 13.5 13.7 13.9    243 249     Imaging  Xray Left Toe  1/4/2022  IMPRESSION:   1. There is an erosion of the dorsal aspect of the articular surface of the base of the distal phalanx of the great toe seen on the lateral view. This would raise the question of osteomyelitis/septic joint in the appropriate clinical setting. MRI could   further evaluate if clinically warranted.  2. Soft tissue swelling in the great toe.  3. Partial amputation of the second and third toes. Moderate osteoarthrosis at the first MTP joint.

## 2022-01-06 NOTE — PROGRESS NOTES
Orthopaedic Surgery Progress Note 01/06/2022    S: No acute events overnight.  Pain stable. No fevers. No new concerns    O:  Temp: 97.5  F (36.4  C) Temp src: Oral BP: 131/77 Pulse: 69   Resp: 18 SpO2: 98 % O2 Device: None (Room air)      Exam:  Gen: No acute distress, resting comfortably in bed.  Resp: Non-labored breathing  MSK:  LLE:  - Dressing to toe c/d/i    Recent Labs   Lab 01/06/22  0759 01/05/22  0631 01/04/22  1541   WBC 4.8 5.3 4.8   HGB 10.0* 10.3* 10.3*    243 249   CRP  --  16.0* 24.0*     Culture from wound swab: no organisms, 4+ PMNs    Assessment: Jayro Elder is a 71 year old male with diabetes, peripheral neuropathy, coronary artery disease presenting with a left great toe infection, wound that probes to bone, and xrays with tiny cortical erosion. Overall picture is consistent with osteomyelitis of the left great toe. Cultures pending.      RECOMMENDATIONS:   - Admit to Medicine.  - Recommend wound care consult given pressure wound between toes. OK to remove bandage placed over toe in ED.   - Plan for OR: none at this time, plan for treatment with IV antibiotics, should this fail would consider possible amputation at that time  - Anticoagulation/DVT Prophylaxis: continue  - Antibiotics/Tetanus: per primary team/ID  - X-rays/Imaging: complete  - Activity: OK for out of bed activity  - Weight bearing: OK to weight bear through heel; please obtain hard soled shoe prior to walking  - Pain control: per primary team  - Diet: OK for diet  - Follow-up: Outpatient with Podiatry for wound cares.   - Disposition: floor     Assessment and Plan discussed with Dr. Medina, Orthopaedic Surgery Staff.     SAVANNA HERNANDEZ PA-C  1/6/2022 8:40 AM  Orthopaedic Surgery     Thank you for allowing me to participate in this patient's care. Please page me directly any questions/concerns.   Securely message with the Vocera Web Console (learn more here)  Text page via [employer] Paging/Directory    If there is  no response, if it is a weekend, or if it is during evening hours, please page the orthopaedic surgery resident on call via [facility] Paging/Directory

## 2022-01-07 VITALS
WEIGHT: 259 LBS | BODY MASS INDEX: 31.54 KG/M2 | HEIGHT: 76 IN | HEART RATE: 73 BPM | OXYGEN SATURATION: 96 % | TEMPERATURE: 96.6 F | SYSTOLIC BLOOD PRESSURE: 130 MMHG | DIASTOLIC BLOOD PRESSURE: 83 MMHG | RESPIRATION RATE: 18 BRPM

## 2022-01-07 PROBLEM — R11.0 NAUSEA: Status: ACTIVE | Noted: 2022-01-07

## 2022-01-07 LAB
ANION GAP SERPL CALCULATED.3IONS-SCNC: 6 MMOL/L (ref 3–14)
BACTERIA WND CULT: NO GROWTH
BUN SERPL-MCNC: 18 MG/DL (ref 7–30)
CALCIUM SERPL-MCNC: 9.3 MG/DL (ref 8.5–10.1)
CHLORIDE BLD-SCNC: 103 MMOL/L (ref 94–109)
CO2 SERPL-SCNC: 26 MMOL/L (ref 20–32)
CREAT SERPL-MCNC: 1.32 MG/DL (ref 0.66–1.25)
CRP SERPL-MCNC: 9 MG/L (ref 0–8)
ERYTHROCYTE [DISTWIDTH] IN BLOOD BY AUTOMATED COUNT: 13.6 % (ref 10–15)
GFR SERPL CREATININE-BSD FRML MDRD: 58 ML/MIN/1.73M2
GLUCOSE BLD-MCNC: 160 MG/DL (ref 70–99)
GLUCOSE BLDC GLUCOMTR-MCNC: 138 MG/DL (ref 70–99)
GLUCOSE BLDC GLUCOMTR-MCNC: 140 MG/DL (ref 70–99)
GLUCOSE BLDC GLUCOMTR-MCNC: 140 MG/DL (ref 70–99)
GLUCOSE BLDC GLUCOMTR-MCNC: 196 MG/DL (ref 70–99)
GRAM STAIN RESULT: NORMAL
GRAM STAIN RESULT: NORMAL
HCT VFR BLD AUTO: 30.2 % (ref 40–53)
HGB BLD-MCNC: 10 G/DL (ref 13.3–17.7)
MCH RBC QN AUTO: 29.1 PG (ref 26.5–33)
MCHC RBC AUTO-ENTMCNC: 33.1 G/DL (ref 31.5–36.5)
MCV RBC AUTO: 88 FL (ref 78–100)
PLATELET # BLD AUTO: 224 10E3/UL (ref 150–450)
POTASSIUM BLD-SCNC: 3.9 MMOL/L (ref 3.4–5.3)
RBC # BLD AUTO: 3.44 10E6/UL (ref 4.4–5.9)
SARS-COV-2 RNA RESP QL NAA+PROBE: POSITIVE
SODIUM SERPL-SCNC: 135 MMOL/L (ref 133–144)
VANCOMYCIN SERPL-MCNC: 21.5 MG/L
WBC # BLD AUTO: 3.7 10E3/UL (ref 4–11)

## 2022-01-07 PROCEDURE — 250N000011 HC RX IP 250 OP 636: Performed by: STUDENT IN AN ORGANIZED HEALTH CARE EDUCATION/TRAINING PROGRAM

## 2022-01-07 PROCEDURE — 80202 ASSAY OF VANCOMYCIN: CPT | Performed by: INTERNAL MEDICINE

## 2022-01-07 PROCEDURE — 85027 COMPLETE CBC AUTOMATED: CPT | Performed by: STUDENT IN AN ORGANIZED HEALTH CARE EDUCATION/TRAINING PROGRAM

## 2022-01-07 PROCEDURE — 80048 BASIC METABOLIC PNL TOTAL CA: CPT | Performed by: STUDENT IN AN ORGANIZED HEALTH CARE EDUCATION/TRAINING PROGRAM

## 2022-01-07 PROCEDURE — 250N000011 HC RX IP 250 OP 636

## 2022-01-07 PROCEDURE — 36415 COLL VENOUS BLD VENIPUNCTURE: CPT | Performed by: STUDENT IN AN ORGANIZED HEALTH CARE EDUCATION/TRAINING PROGRAM

## 2022-01-07 PROCEDURE — 99232 SBSQ HOSP IP/OBS MODERATE 35: CPT | Performed by: PHYSICIAN ASSISTANT

## 2022-01-07 PROCEDURE — 99232 SBSQ HOSP IP/OBS MODERATE 35: CPT | Mod: GC | Performed by: INTERNAL MEDICINE

## 2022-01-07 PROCEDURE — 86140 C-REACTIVE PROTEIN: CPT | Performed by: STUDENT IN AN ORGANIZED HEALTH CARE EDUCATION/TRAINING PROGRAM

## 2022-01-07 PROCEDURE — U0005 INFEC AGEN DETEC AMPLI PROBE: HCPCS | Performed by: STUDENT IN AN ORGANIZED HEALTH CARE EDUCATION/TRAINING PROGRAM

## 2022-01-07 PROCEDURE — 99239 HOSP IP/OBS DSCHRG MGMT >30: CPT | Mod: GC | Performed by: INTERNAL MEDICINE

## 2022-01-07 PROCEDURE — 250N000013 HC RX MED GY IP 250 OP 250 PS 637

## 2022-01-07 RX ORDER — ONDANSETRON 4 MG/1
8 TABLET, FILM COATED ORAL ONCE
Status: COMPLETED | OUTPATIENT
Start: 2022-01-07 | End: 2022-01-07

## 2022-01-07 RX ORDER — LEVOFLOXACIN 750 MG/1
750 TABLET, FILM COATED ORAL DAILY
Qty: 30 TABLET | Refills: 0 | Status: SHIPPED | OUTPATIENT
Start: 2022-01-07 | End: 2022-01-07

## 2022-01-07 RX ORDER — DOXYCYCLINE HYCLATE 100 MG
100 TABLET ORAL 2 TIMES DAILY
Qty: 84 TABLET | Refills: 0 | Status: SHIPPED | OUTPATIENT
Start: 2022-01-07 | End: 2022-01-01

## 2022-01-07 RX ORDER — METRONIDAZOLE 500 MG/1
500 TABLET ORAL 3 TIMES DAILY
Qty: 90 TABLET | Refills: 0 | Status: SHIPPED | OUTPATIENT
Start: 2022-01-07 | End: 2022-01-07

## 2022-01-07 RX ORDER — METRONIDAZOLE 500 MG/1
500 TABLET ORAL 3 TIMES DAILY
Qty: 126 TABLET | Refills: 0 | Status: SHIPPED | OUTPATIENT
Start: 2022-01-07 | End: 2022-01-01

## 2022-01-07 RX ORDER — DOXYCYCLINE HYCLATE 100 MG
100 TABLET ORAL 2 TIMES DAILY
Qty: 60 TABLET | Refills: 0 | Status: SHIPPED | OUTPATIENT
Start: 2022-01-07 | End: 2022-01-07

## 2022-01-07 RX ORDER — LEVOFLOXACIN 750 MG/1
750 TABLET, FILM COATED ORAL DAILY
Qty: 42 TABLET | Refills: 0 | Status: SHIPPED | OUTPATIENT
Start: 2022-01-07 | End: 2022-01-01

## 2022-01-07 RX ORDER — ONDANSETRON 8 MG/1
8 TABLET, FILM COATED ORAL EVERY 8 HOURS PRN
Qty: 21 TABLET | Refills: 0 | Status: SHIPPED | OUTPATIENT
Start: 2022-01-07 | End: 2022-01-14

## 2022-01-07 RX ADMIN — PIPERACILLIN AND TAZOBACTAM 3.38 G: 3; .375 INJECTION, POWDER, FOR SOLUTION INTRAVENOUS at 02:12

## 2022-01-07 RX ADMIN — INSULIN GLARGINE 20 UNITS: 100 INJECTION, SOLUTION SUBCUTANEOUS at 09:08

## 2022-01-07 RX ADMIN — CARVEDILOL 25 MG: 25 TABLET, FILM COATED ORAL at 17:31

## 2022-01-07 RX ADMIN — APIXABAN 5 MG: 5 TABLET, FILM COATED ORAL at 09:12

## 2022-01-07 RX ADMIN — CARVEDILOL 25 MG: 25 TABLET, FILM COATED ORAL at 09:12

## 2022-01-07 RX ADMIN — LISINOPRIL 2.5 MG: 2.5 TABLET ORAL at 09:12

## 2022-01-07 RX ADMIN — ACETAMINOPHEN 650 MG: 325 TABLET, FILM COATED ORAL at 09:11

## 2022-01-07 RX ADMIN — PIPERACILLIN AND TAZOBACTAM 3.38 G: 3; .375 INJECTION, POWDER, FOR SOLUTION INTRAVENOUS at 13:28

## 2022-01-07 RX ADMIN — ISOSORBIDE MONONITRATE 60 MG: 30 TABLET, EXTENDED RELEASE ORAL at 09:12

## 2022-01-07 RX ADMIN — ONDANSETRON HYDROCHLORIDE 8 MG: 4 TABLET, FILM COATED ORAL at 15:47

## 2022-01-07 RX ADMIN — CLOPIDOGREL BISULFATE 75 MG: 75 TABLET ORAL at 09:12

## 2022-01-07 RX ADMIN — PANTOPRAZOLE SODIUM 40 MG: 40 TABLET, DELAYED RELEASE ORAL at 09:12

## 2022-01-07 RX ADMIN — PIPERACILLIN AND TAZOBACTAM 3.38 G: 3; .375 INJECTION, POWDER, FOR SOLUTION INTRAVENOUS at 09:06

## 2022-01-07 RX ADMIN — FUROSEMIDE 40 MG: 40 TABLET ORAL at 09:12

## 2022-01-07 ASSESSMENT — ACTIVITIES OF DAILY LIVING (ADL)
ADLS_ACUITY_SCORE: 8

## 2022-01-07 NOTE — PLAN OF CARE
"Reason for admission: L great toe osteomyelitis  /61 (BP Location: Right arm)   Pulse 88   Temp 98.6  F (37  C) (Oral)   Resp 18   Ht 1.93 m (6' 4\")   Wt 117.5 kg (259 lb)   SpO2 96%   BMI 31.53 kg/m    Neuro: AOx4  Pain: c/o 2/10 pain L foot, denied intervention  CMS: BUE and BLE numbness and tingling baseline  Cardiac: WDL  Resp: WDL, O2>90 on RA  GI/: voiding adequately, LBM 1/6/2021  Skin/Wound: L great toe wound dressed per orders next change due 1/8/2021  Access: PIV infusing TKO with abx  Labs: reviewed, BG stable on sliding scale  Activity: Up SBA   Nutrition: CHO, no caffeine, no sat fat, na<2400  Changes this shift: droplet precautions added d/t COVID exposure from family member  Plan: Monitor for symptoms of COVID-19      "

## 2022-01-07 NOTE — PROVIDER NOTIFICATION
"Provider notified, \"Hello, pt tested (+) for SARs. Family is concerned and are not sure that it is safe for pt to discharge home. Family (Bill) wants to speak with medical team and is asking for call back at [phone]. thank you. collin. \"    Collin Darnell RN on 1/7/2022 at 4:45 PM    "

## 2022-01-07 NOTE — PROGRESS NOTES
General ID Service: Follow Up Note      Patient:  Jayro Elder, Date of birth 1950, Medical record number [id number]  Date of Visit:  January 7, 2022         Assessment and Recommendations:   Problem List:  1. Osteomyelitis and cellulitis of the L great toe  2. Type 2 diabetes with prior history of multiple diabetic foot infections and osteomyelitis necessitating toe amputations  3. Severe diabetic neuropathy      Recommendations:  -stop vancomycin and zosyn  -start doxycycline 100mg PO BID, levofloxacin 750mg PO daily, metronidazole 500mg PO TID  -plan for 6 week course. We will arrange ID outpatient follow-up  -please arrange podiatry and ortho follow-up as well.    ID will sign off. Please don't hesitate to page with questions.     Discussion:  This patient is unfortunately readmitted for L great toe cellulitis and osteomyelitis. He was recently evaluated by vascular surgery at [facility] on 12/9 and was not found to have any open ulcers/wounds and workup for PAD was negative. He did have a callus removed from his L great toe at that time without underlying ulcer, though a pit was noted. His infection appears to have worsened since his recent admission to [facility], despite oral keflex regimen. No systemic symptoms per the patient.     Clinical examination revealed ulceration to bone and imaging confirmed findings of osteomyelitis with bony erosion of the base of the distal phalanx of L great toe,. No further MRI imaging is needed. Wound cultures were obtained, but gram stain and culture both finalized as negative.     Will recommend broad spectrum empiric antibiotics with doxycycline, levofloxacin, and metronidazole for 6 weeks with outpatient ID, ortho, and podiatry follow-up.       Maverick Mcgrath MD  IDIM Fellow, PGY-4  P: [phone]        Interval History:     NAEON. Some pain in foot otherwise denies fever, chills, nausea, decreased appetite, abd pain, diarrhea, rash.              Review of  Systems:     Full 9 pt ROS obtained and negative unless noted above in assessment and interval history.         Current Antimicrobials     Anti-infectives (From now, onward)    Start     Dose/Rate Route Frequency Ordered Stop    01/07/22 0000  levofloxacin (LEVAQUIN) 750 MG tablet         750 mg Oral DAILY 01/07/22 1508 02/06/22 2359 01/07/22 0000  doxycycline hyclate (VIBRA-TABS) 100 MG tablet         100 mg Oral 2 TIMES DAILY 01/07/22 1508 02/06/22 2359 01/07/22 0000  metroNIDAZOLE (FLAGYL) 500 MG tablet         500 mg Oral 3 TIMES DAILY 01/07/22 1508 02/06/22 2359                 Physical Exam:   Ranges for vital signs:  Temp:  [96.6  F (35.9  C)-98.6  F (37  C)] 96.6  F (35.9  C)  Pulse:  [60-96] 73  Resp:  [18] 18  BP: (119-144)/(61-83) 130/83  SpO2:  [93 %-96 %] 96 %    Intake/Output Summary (Last 24 hours) at 1/7/2022 1550  Last data filed at 1/7/2022 1536  Gross per 24 hour   Intake 420 ml   Output 875 ml   Net -455 ml     Exam:  GENERAL:  well-developed, well-nourished, sitting in bed in no acute distress.   ENT:  Head is normocephalic, atraumatic. Oropharynx is moist without exudates or ulcers.  EYES:  Eyes have anicteric sclerae.    NECK:  Supple.  LUNGS:  Clear to auscultation.  CARDIOVASCULAR:  Regular rate and rhythm with no murmurs, gallops or rubs.  ABDOMEN:  Normal bowel sounds, soft, nontender.  EXT: left 1st toe is wrapped, no erythema or warmth spreading proximal to bandage  SKIN:  No acute rashes.  Line is in place without any surrounding erythema.  NEUROLOGIC:  Grossly nonfocal.         Laboratory Data:   Reviewed.  Pertinent for:    Laboratory Data:   No results found for: ACD4    Microbiology:  Culture   Date Value Ref Range Status   01/05/2022 No growth after 2 days  Preliminary   01/05/2022 No growth after 2 days  Preliminary   01/04/2022 No Growth  Final         Inflammatory Markers    Recent Labs   Lab Test 01/07/22  0652 01/05/22  0631 01/04/22  1541 09/30/20  2110 07/16/20  1743  03/10/19  2054 09/08/18  0614 09/07/18  0744 03/07/18  0713 03/05/18  0724 03/04/18  0637 04/21/17  0440 06/21/16  1245   SED  --   --   --  8 18 25* 15  --   --   --  17  --  5   CRP 9.0* 16.0* 24.0* <2.9 15.2*  --  81.6* 103.0* 43.3*   < > 152.0*   < > <2.9    < > = values in this interval not displayed.         Metabolic Studies       Recent Labs   Lab Test 01/07/22  1208 01/07/22  0839 01/07/22  0652 01/07/22  0212 01/06/22  2120 01/06/22  1716 01/06/22  1303 01/06/22  0759 01/05/22  0823 01/05/22  0631 01/05/22  0016 01/04/22  1541 12/29/21  1143 12/29/21  1054 12/28/21  0849 12/28/21  0710 05/07/21  1841 05/03/21  1540 03/03/18  1546 03/03/18  1450   NA  --   --  135  --   --   --   --  137  --  138  --  138  --  136  --  136   < > 137   < > 131*   POTASSIUM  --   --  3.9  --   --   --   --  3.8  --  3.3*  --  3.4  --  3.5  --  3.1*   < > 3.5   < > 3.5   CHLORIDE  --   --  103  --   --   --   --  104  --  105  --  103  --  101  --  101   < > 102   < > 97   CO2  --   --  26  --   --   --   --  28  --  25  --  32  --  30  --  29   < > 31   < > 26   ANIONGAP  --   --  6  --   --   --   --  5  --  8  --  3  --  5  --  6   < > 4   < > 8   BUN  --   --  18  --   --   --   --  21  --  20  --  21  --  18  --  19   < > 20   < > 18   CR  --   --  1.32*  --   --   --   --  1.27*  --  1.14  --  1.18  --  1.02  --  1.12   < > 1.22   < > 0.92   GFRESTIMATED  --   --  58*  --   --   --   --  60*  --  69  --  66  --  79  --  70   < > 59*   < > 82   * 140* 160* 196* 177* 167*   < > 189*   < > 142*   < > 144*   < > 196*   < > 186*   < > 166*   < > 256*   A1C  --   --   --   --   --   --   --   --   --   --   --   --   --   --   --  8.5*  --   --    < >  --    BETTIE  --   --  9.3  --   --   --   --  9.3  --  9.2  --  8.8  --  8.8  --  8.7   < > 8.7   < > 9.1   MAG  --   --   --   --   --   --   --  2.2  --   --   --   --   --   --   --  1.9  --   --    < >  --    LACT  --   --   --   --   --   --   --   --   --   --   --   1.2  --   --   --   --   --  1.1   < >  --    CKT  --   --   --   --   --   --   --   --   --   --   --   --   --   --   --   --   --   --   --  56    < > = values in this interval not displayed.       Hepatic Studies    Recent Labs   Lab Test 01/05/22  0631 01/04/22  1541 12/27/21  1530 04/08/21  0243 04/07/21 2014 01/17/19  1652   BILITOTAL 1.0 0.9 1.7* 1.4* 1.3 1.2   ALKPHOS 105 122 118 109 108 77   ALBUMIN 2.9* 3.2* 3.4 3.7 3.6 3.3*   AST 16 13 19 14 15 10   ALT 25 30 38 30 30 17       Pancreatitis testing    Recent Labs   Lab Test 04/08/21  0426 08/13/20  1028 07/08/19  0643 01/18/19  0611 04/21/17  0440 06/30/16  0849   TRIG 52 44 84 83 59 75       Hematology Studies      Recent Labs   Lab Test 01/07/22  0652 01/06/22  0759 01/05/22  0631 01/04/22  1541 12/29/21  0624 12/28/21  0710 05/08/21  0730 05/07/21  1841 05/03/21  1540 04/15/21  1248 04/08/21  0243 04/07/21 2014 01/04/21  0616 01/03/21  1823   WBC 3.7* 4.8 5.3 4.8 5.4 5.9   < > 6.2 4.7   < > 4.5 5.3 6.1 8.3   ANEU  --   --   --   --   --   --   --  3.8 2.9  --  2.9 3.8 3.8 6.5   ALYM  --   --   --   --   --   --   --  1.4 1.1  --  0.8 0.7* 1.6 1.2   MATT  --   --   --   --   --   --   --  0.5 0.4  --  0.5 0.5 0.5 0.5   AEOS  --   --   --   --   --   --   --  0.3 0.3  --  0.2 0.2 0.2 0.2   HGB 10.0* 10.0* 10.3* 10.3* 9.7* 9.9*   < > 11.7* 11.2*   < > 10.2* 10.3* 13.9 15.0   HCT 30.2* 30.7* 31.0* 31.7* 31.0* 30.7*   < > 35.5* 33.7*   < > 31.5* 31.7* 40.7 43.2    242 243 249 167 175   < > 225 202   < > 209 203 210 242    < > = values in this interval not displayed.       Arterial Blood Gas Testing    Recent Labs   Lab Test 09/30/20  2110   O2PER room air        Urine Studies     Recent Labs   Lab Test 04/07/21  2132 01/17/19  1903 01/09/19  1606 09/06/18  1542   URINEPH 7.0 5.0 5.0 5.0   NITRITE Negative Negative Negative Negative   LEUKEST Negative Trace* Negative Negative   WBCU <1 3 2 1       Vancomycin Levels     Recent Labs   Lab Test  01/07/22  0652 05/10/21  0806 05/09/21  1752 01/07/19 2012 03/05/18  0724   VANCOMYCIN 21.5 18.7 29.6* 14.5 12.5            Imaging:   Foot XR, G/E 3 views, left    Result Date: 1/4/2022  EXAM: XR FOOT LEFT G/E 3 VIEWS LOCATION: [facility] DATE/TIME: 1/4/2022 5:34 PM INDICATION: Pain and swelling. COMPARISON: None.     IMPRESSION: Amputation of the distal end of the 2nd and 3rd toes. Soft tissue swelling in the great toe. Mild to moderate multifocal osteoarthrosis. Plantar calcaneal enthesophyte. Calcifications in the region of the plantar fascia. Arterial calcifications. Small osteochondroma of the dorsal aspect of the base of the proximal phalanx of the great toe again noted. Otherwise negative. No radiographic evidence of osteomyelitis.    XR Toe Left G/E 2 Views    Result Date: 1/4/2022  EXAM: XR TOE LEFT G/E 2 VIEWS LOCATION: [facility] DATE/TIME: 1/4/2022 5:35 PM INDICATION: Question of osteomyelitis or complication from infection. Pain and swelling. COMPARISON: None.     IMPRESSION: 1. There is an erosion of the dorsal aspect of the articular surface of the base of the distal phalanx of the great toe seen on the lateral view. This would raise the question of osteomyelitis/septic joint in the appropriate clinical setting. MRI could further evaluate if clinically warranted. 2. Soft tissue swelling in the great toe. 3. Partial amputation of the second and third toes. Moderate osteoarthrosis at the first MTP joint.

## 2022-01-07 NOTE — PROGRESS NOTES
[facility]    Medicine Progress Note - Hospitalist Service, Kimberlee 5       Date of Admission:  1/4/2022    Assessment & Plan           Jayro Elder is a 71 year old male who has a history of T2DM (A1c 8.5 12/28/21), A fib, CAD, HTN, HLD, CKD IIIa, CHF (EF 41% 12/28/21), prior MI, CVA, who is admitted for worsening left great toe wound concerning for osteomyelitis. Culture is negative to date.      # Left 1st Toe Osteomyelitis  He was evaluated at [place] by vascular 12/2021where they noted he had a callus over his left great toe with ecchymosis beneath that was bluntly removed with no blood or ulcer present underneath.  On 12/27, erythema was noted and he received IV Ancef before de-escalating to PO Keflex. He has history of left third toe osteomyelitis s/p amputation, excisional debridement in May 2021. Bone culture at that time grew MSSA, Finegoldia magna, Porphyromonas species. CRP 24.   - X-ray of left toe shows erosion concerning for osteomyelitis   - Ortho consulted, appreciate recs. No plan for OR at this time.   - Uric acid was mildly elevated at 9.1 during his last admission, 9.0 today.  No history of gout or concern for gout today  - Continue pain control with PTA gabapentin, Tylenol  - Blood cultures and wound cultures NGTD  - ID consulted, appreciate recs.               > Continue vanc/zosyn for now. Will discuss with ID regarding transition to PO regimen     # ICM (EF 41% on 12/28/21)  # Severe Pulmonary Hypertension  # HTN  Coronary angiogram performed on 12/28/2021 that showed no changes since April, with patent LAD, RCA, OM 3 stents with mild in-stent restenosis and no PCI.  No chest pain or shortness of breath today.  - Continue PTA lisinopril, carvedilol, Imdur, Lasix   - Continue PTA Plavix. ASA appears to have been restarted during last admission, but patient states he has not been taking it at home     # Atrial fibrillation (CHADSVASC 5)  - Rate  controlled on exam, continue PTA carvedilol  - Continue PTA apixaban      # T2DM with peripheral neuropathy  - Continue gabapentin PRN   - PTA insulin regimen: lantus 44U qAM, aspart 12U breakfast, lunch, 14U dinner  - Due to decreased PO intake today, will order lantus 20U tomorrow, high intensity sliding scale insulin. Will likely need to increase dose in next few days     # Hypothyroidism: continue PTA levothyroxine  # GERD: continue PTA protonix       Diet: Combination Diet Moderate Consistent Carb (60 g CHO per Meal) Diet; Low Saturated Fat Na <2400mg Diet, No Caffeine Diet    DVT Prophylaxis: Ambulate every shift  Taylor Catheter: Not present  Central Lines: None  Code Status: Full Code      Disposition Plan   Expected Discharge: 01/08/2022     Anticipated discharge location: home with family    Delays:     IV antibiotics        The patient's care was discussed with the Bedside Nurse, Care Coordinator/ and Patient.    Johnathan Benitez MD  Hospitalist Service, [address]  Securely message with the Vocera Web Console (learn more here)  Text page via [email or web address] Paging/Directory    Please see sign in/sign out for up to date coverage information    Clinically Significant Risk Factors Present on Admission             # Obesity: last Body mass index is 31.53 kg/m .      ______________________________________________________________________    Interval History   No acute events overnight. Nursing notes reviewed, using orthotics. No complaints today.     Otherwise 4pt ROS was reviewed and is negative     Data reviewed today: I reviewed all medications, new labs and imaging results over the last 24 hours. I personally reviewed no images or EKG's today.    Physical Exam   Vital Signs: Temp: 97.6  F (36.4  C) Temp src: Oral BP: (!) 144/82 Pulse: 96   Resp: 18 SpO2: 93 % O2 Device: None (Room air)    Weight: 259 lbs 0 oz  Gen: NAD, sitting comfortably in  bed  Eyes: PERRLA, EOMI, conjuctiva clear  Mouth: OP clear, no lesions  Neck: No cervical LAD, supple  CV: RRR, no murmurs, 2+ radial pulses  RESP: CTA bilaterally, no w/r/c  Abd: soft, nontender, nondistended  Ext: no edema bilaterally, no rash  Neuro: CNII-CNXII intact, moving all extremities    Data   Recent Labs   Lab 01/07/22  0839 01/07/22  0652 01/07/22  0212 01/06/22  1303 01/06/22  0759 01/05/22  0823 01/05/22  0631 01/05/22  0016 01/04/22  1541   WBC  --  3.7*  --   --  4.8  --  5.3  --  4.8   HGB  --  10.0*  --   --  10.0*  --  10.3*  --  10.3*   MCV  --  88  --   --  91  --  89  --  91   PLT  --  224  --   --  242  --  243  --  249   NA  --  135  --   --  137  --  138  --  138   POTASSIUM  --  3.9  --   --  3.8  --  3.3*  --  3.4   CHLORIDE  --  103  --   --  104  --  105  --  103   CO2  --  26  --   --  28  --  25  --  32   BUN  --  18  --   --  21  --  20  --  21   CR  --  1.32*  --   --  1.27*  --  1.14  --  1.18   ANIONGAP  --  6  --   --  5  --  8  --  3   BETTIE  --  9.3  --   --  9.3  --  9.2  --  8.8   * 160* 196*   < > 189*   < > 142*   < > 144*   ALBUMIN  --   --   --   --   --   --  2.9*  --  3.2*   PROTTOTAL  --   --   --   --   --   --  7.0  --  7.6   BILITOTAL  --   --   --   --   --   --  1.0  --  0.9   ALKPHOS  --   --   --   --   --   --  105  --  122   ALT  --   --   --   --   --   --  25  --  30   AST  --   --   --   --   --   --  16  --  13    < > = values in this interval not displayed.     No results found for this or any previous visit (from the past 24 hour(s)).

## 2022-01-07 NOTE — DISCHARGE SUMMARY
Medicine Discharge Summary  Jayro Elder MRN: [id number]  1950  ___________________________________          Date of Admission:  1/4/2022  Date of Discharge:  1/7/2022  Admitting Physician: Johnathan Benitez MD  Discharge Physician: Johnathan Benitez MD  Discharging Service: [facility] Team 5  Primary Provider:             No Ref-Primary, Physician         Discharge Follow-Up Plans:   [  ]  Complete 6 week course of antibiotics (levofloxacin, doxycycline and metronidazole) for osteomyelitis  [  ]  Follow up with Infectious Disease in Clinic (referral ordered) in 6 weeks  [  ]  Follow up with outpatient Podiatrist for wound cares         Primary and Secondary Diagnoses:   1. Left 1st Toe Osteomyelitis  2. Ischemic Cardiomyopathy  3. Severe Pulmonary Hypertension  4. Hypertension  5. Atrial Fibrillation on Anticoagulation  6. Type 2 Diabetes Mellitus with Peripheral Neuropathy  7. Hypothyroidism  8. GERD         History of Present Illness:   Jayro Elder is a 71 year old male who has a history of T2DM (A1c 8.5 12/28/21), A fib, CAD, HTN, HLD, CKD IIIa, CHF (EF 41% 12/28/21), prior MI, CVA, who is admitted for worsening left great toe wound concerning for osteomyelitis.    Please see Admission H&P for more details.         Hospital Course by Problem:      # Left 1st Toe Osteomyelitis  He was evaluated at [place] by vascular 12/2021where they noted he had a callus over his left great toe with ecchymosis beneath that was bluntly removed with no blood or ulcer present underneath.  On 12/27, erythema was noted and he received IV Ancef before de-escalating to PO Keflex. He has history of left third toe osteomyelitis s/p amputation, excisional debridement in May 2021. Bone culture at that time grew MSSA, Finegoldia magna, Porphyromonas species. CRP 24. Admission Xray showed erosion concerning for osteomyelitis.  Orthopedic surgery was consulted and no surgical  "intervention indicated at this time.  Initially, patient was initiated on broad IV antibiotics with IV vancomycin and Zosyn.  However, that was transitioned to oral levofloxacin, doxycycline and metronidazole despite negative wound culture.  Infectious disease saw patient and will plan to continue this oral regimen for the next 6 weeks with follow-up in clinic.  - Antibiotics for 6 weeks:    > PO Levofloxacin 750mg Daily   > PO Metronidazole 500mg TID   > PO Doxycycline 100mg BID  - Follow up w/ infectious disease in clinic in 6 weeks  - Follow-up with outpatient podiatrist for ongoing wound care     # ICM (EF 41% on 12/28/21)  # Severe Pulmonary Hypertension  # HTN  Coronary angiogram performed on 12/28/2021 that showed no changes since April, with patent LAD, RCA, OM 3 stents with mild in-stent restenosis and no PCI.  No chest pain or shortness of breath today.  - Continue PTA lisinopril, carvedilol, Imdur, Lasix, Plavix     # Atrial fibrillation (CHADSVASC 5)  - PTA Carvedilol  - PTA apixaban      # T2DM with peripheral neuropathy  - PTA gabapentin  - PTA insulin regimen: lantus 44U qAM, aspart 12U breakfast, lunch, 14U dinner     # Hypothyroidism  - PTA levothyroxine    # GERD  - PTA protonix         Physical Exam on day of Discharge:     Blood pressure 130/83, pulse 73, temperature (!) 96.6  F (35.9  C), temperature source Oral, resp. rate 18, height 1.93 m (6' 4\"), weight 117.5 kg (259 lb), SpO2 96 %.    Physical Exam:  Gen: NAD, sitting comfortably in bed  Eyes: PERRLA, EOMI, conjuctiva clear  Mouth: OP clear, no lesions  Neck: No cervical LAD, supple  CV: RRR, no murmurs, 2+ radial pulses  RESP: CTA bilaterally, no w/r/c  Abd: soft, nontender, nondistended  Ext: no edema bilaterally, no rash  Neuro: CNII-CNXII intact, moving all extremities         Significant Laboratory Findings at Discharge:     CBC  Recent Labs   Lab 01/07/22  0652 01/06/22  0759 01/05/22  0631 01/04/22  1541   WBC 3.7* 4.8 5.3 4.8   RBC " 3.44* 3.39* 3.47* 3.50*   HGB 10.0* 10.0* 10.3* 10.3*   HCT 30.2* 30.7* 31.0* 31.7*   MCV 88 91 89 91   MCH 29.1 29.5 29.7 29.4   MCHC 33.1 32.6 33.2 32.5   RDW 13.6 13.5 13.7 13.9    242 243 249       BMP  Recent Labs   Lab 01/07/22  1208 01/07/22  0839 01/07/22  0652 01/07/22  0212 01/06/22  1303 01/06/22  0759 01/05/22  0823 01/05/22  0631 01/05/22  0016 01/04/22  1541   NA  --   --  135  --   --  137  --  138  --  138   POTASSIUM  --   --  3.9  --   --  3.8  --  3.3*  --  3.4   CHLORIDE  --   --  103  --   --  104  --  105  --  103   CO2  --   --  26  --   --  28  --  25  --  32   ANIONGAP  --   --  6  --   --  5  --  8  --  3   * 140* 160* 196*   < > 189*   < > 142*   < > 144*   BUN  --   --  18  --   --  21  --  20  --  21   CR  --   --  1.32*  --   --  1.27*  --  1.14  --  1.18   GFRESTIMATED  --   --  58*  --   --  60*  --  69  --  66   BETTIE  --   --  9.3  --   --  9.3  --  9.2  --  8.8   MAG  --   --   --   --   --  2.2  --   --   --   --     < > = values in this interval not displayed.     Liver panel  Recent Labs   Lab 01/05/22  0631 01/04/22  1541   PROTTOTAL 7.0 7.6   ALBUMIN 2.9* 3.2*   BILITOTAL 1.0 0.9   ALKPHOS 105 122   AST 16 13   ALT 25 30     Urinalysis  Recent Labs   Lab Test 04/07/21  2132   COLOR Yellow   APPEARANCE Clear   URINEGLC Negative   URINEBILI Negative   URINEKETONE Negative   SG 1.020   UBLD Negative   URINEPH 7.0   PROTEIN 10*   NITRITE Negative   LEUKEST Negative   RBCU <1   WBCU <1     Cultures  Last 6 Culture results with specimen source  Culture Micro   Date Value Ref Range Status   05/08/2021 (A)  Final    Light growth  Finegoldia magna (Peptostreptococcus jennifer)     05/08/2021 (A)  Final    Light growth  Porphyromonas species  No further identification  Beta lactamase positive     05/08/2021 Susceptibility testing not routinely done  Final   05/08/2021 Light growth  Staphylococcus aureus   (A)  Final   07/16/2020 No growth  Final   07/16/2020 No growth  Final     Specimen Description   Date Value Ref Range Status   05/08/2021 Bone LEFT THIRD TOE  Final   05/08/2021 Bone LEFT THIRD TOE  Final   05/08/2021 Bone LEFT THIRD TOE  Final   07/16/2020 Blood Left Arm  Final   07/16/2020 Blood Right Arm  Final   04/18/2020 Toe Left second  Final   04/18/2020 Toe Left second  Final             Significant Imaging, Procedures and Other Findings:   Left Feet Xray  1/4/2022  IMPRESSION:   1. There is an erosion of the dorsal aspect of the articular surface of the base of the distal phalanx of the great toe seen on the lateral view. This would raise the question of osteomyelitis/septic joint in the appropriate clinical setting. MRI could   further evaluate if clinically warranted.  2. Soft tissue swelling in the great toe.  3. Partial amputation of the second and third toes. Moderate osteoarthrosis at the first MTP joint.         Consultations:   - Orthopedic Surgery  - Infectious Disease         Discharge Medications:     Discharge Medication List as of 1/7/2022  3:37 PM      START taking these medications    Details   ondansetron (ZOFRAN) 8 MG tablet Take 1 tablet (8 mg) by mouth every 8 hours as needed for nausea, Disp-21 tablet, R-0, E-Prescribe      doxycycline hyclate (VIBRA-TABS) 100 MG tablet Take 1 tablet (100 mg) by mouth 2 times daily Osteomyelitis, Disp-60 tablet, R-0, E-Prescribe      levofloxacin (LEVAQUIN) 750 MG tablet Take 1 tablet (750 mg) by mouth daily Osteomyelitis, Disp-30 tablet, R-0, E-Prescribe      metroNIDAZOLE (FLAGYL) 500 MG tablet Take 1 tablet (500 mg) by mouth 3 times daily Osteomyelitis, Disp-90 tablet, R-0, E-Prescribe         CONTINUE these medications which have NOT CHANGED    Details   ammonium lactate (AMLACTIN) 12 % external cream Apply topically 2 times dailyDisp-385 g, [id number]      apixaban ANTICOAGULANT (ELIQUIS) 5 MG tablet Take 1 tablet (5 mg) by mouth 2 times daily, No Print Out      atorvastatin (LIPITOR) 40 MG tablet Take 1 tablet  (40 mg) by mouth every evening, Disp-90 tablet, R-3, E-Prescribe      carvedilol (COREG) 25 MG tablet Take 1 tablet (25 mg) by mouth 2 times daily (with meals), Disp-180 tablet, R-3, E-Prescribe      clopidogrel (PLAVIX) 75 MG tablet Take 1 tablet (75 mg) by mouth daily, Disp-90 tablet, R-3, E-Prescribe      Continuous Blood Gluc  (FREESTYLE CLARITA 2 READER SYSTM) DRAGAN 1 Device daily, Disp-1 each, R-3, E-Prescribe      Continuous Blood Gluc Sensor (FREESTYLE CLARITA 2 SENSOR SYSTM) MISC 1 Units 4 times daily (before meals and nightly), Disp-1 each, R-3, E-Prescribe      furosemide (LASIX) 40 MG tablet Take 1 tablet (40 mg) by mouth 2 times daily, Historical      gabapentin (NEURONTIN) 300 MG capsule TAKE 2-3 CAPSULES (600-900 MG) BY MOUTH AT BEDTIME, Disp-180 capsule, R-1, E-Prescribe      !! insulin aspart (NOVOLOG FLEXPEN) 100 UNIT/ML pen Inject Subcutaneous 3 times daily (with meals) Sliding Scale  Do not give sliding scale insulin if Pre-Meal BG less than 140.   For Pre-Meal:   - 200 give 2 units.    - 250 give 4 units.    - 300 give 6 units.    - 350 give 8 unit s.    - 400 give 10 units., Historical      !! insulin aspart (NOVOLOG PEN) 100 UNIT/ML pen Inject 12 Units Subcutaneous 2 times daily (with meals) With breakfast and lunch, No Print Out      !! insulin aspart (NOVOLOG PEN) 100 UNIT/ML pen Inject 14 Units Subcutaneous daily (with dinner), No Print Out      insulin glargine (LANTUS PEN) 100 UNIT/ML pen Inject 44 units subcutaneously once every morning, HistoricalIf Lantus is not covered by insurance, may substitute Basaglar at same dose and frequency.        insulin pen needle (31G X 6 MM) 31G X 6 MM miscellaneous Use  as directed.Disp-100 each,[id number]      isosorbide mononitrate (IMDUR) 60 MG 24 hr tablet Take 1 tablet (60 mg) by mouth daily, Historical      levothyroxine (SYNTHROID, LEVOTHROID) 137 MCG tablet Take 1 tablet by mouth once every evening, Disp-90  tablet, R-3, Historical      lisinopril (ZESTRIL) 2.5 MG tablet Take 1 tablet (2.5 mg) by mouth daily, Disp-90 tablet, R-3, E-Prescribe      nitroGLYcerin (NITROSTAT) 0.4 MG sublingual tablet For chest pain place 1 tablet under the tongue every 5 minutes for 3 doses. If symptoms persist 5 minutes after 1st dose call 911., Disp-15 tablet, R-3, E-Prescribe      pantoprazole (PROTONIX) 40 MG EC tablet TAKE 1 TABLET BY MOUTH EVERY MORNING BEFORE BREAKFAST, Disp-90 tablet, R-1, E-Prescribe      triamcinolone (KENALOG) 0.1 % external cream Apply topically 2 times dailyDisp-30 g, [id number]       !! - Potential duplicate medications found. Please discuss with provider.      STOP taking these medications       cephALEXin (KEFLEX) 500 MG capsule Comments:   Reason for Stopping:                  Disposition / Condition on Discharge:   Disposition: Home  Discharge Condition: Stable  Code Status on Discharge: Full Code          Discharge Orders:     Discharge Procedure Orders   Infectious Disease Referral   Standing Status: Future   Referral Priority: Routine: Next available opening Referral Type: Consultation   Requested Specialty: Infectious Diseases     Reason for your hospital stay   Order Comments: 1) Osteomyelitis of Left 1st Toe (infection of the bone)     Order Specific Question Answer Comments   Select button to print No Print      Activity   Order Comments: Your activity upon discharge: activity as tolerated     Order Specific Question Answer Comments   Is discharge order? Yes    Select button to print No Print      Discharge Instructions   Order Comments: 1) Please complete 30 days of antibiotics (doxycycline, metronidazole and levofloxacin) as instructed  2) Please follow up with Infectious Disease in Clinic in the next 4 weeks (referral ordered)  3) Please continue to follow up with your outpatient podiatrist for further care of your feet wounds     Adult [facility] Follow-up and recommended labs and tests    Order Comments: 1) Follow up with Infectious Disease in Clinic within the next 4 weeks (referral ordered)  2) Follow up with your outpatient Podiatrist for further wound care    Appointments on [facility] and/or [facility] (with [facility] or [facility] provider or service). Call [phone] if you haven't heard regarding these appointments within 7 days of discharge.     Order Specific Question Answer Comments   Select button to print No Print      Diet   Order Comments: Follow this diet upon discharge: Orders Placed This Encounter      Combination Diet Moderate Consistent Carb (60 g CHO per Meal) Diet; Low Saturated Fat Na <2400mg Diet, No Caffeine Diet     Order Specific Question Answer Comments   Is discharge order? Yes    Select button to print No Print       Patient discussed with my attending physician, Dr. Benitez, who agrees with the assessment and plan.    30 minutes was spent on discharge and the coordination of care.      Nathan Nath MD  Internal Medicine PGY-3  Pager #: [phone]

## 2022-01-07 NOTE — PHARMACY-VANCOMYCIN DOSING SERVICE
"Pharmacy Vancomycin Note  Date of Service 2022  Patient's  1950   71 year old, male    Indication: Osteomyelitis  Day of Therapy: 4  Current vancomycin regimen:  1000 mg IV q12h  Current vancomycin monitoring method: AUC  Current vancomycin therapeutic monitoring goal: 400-600 mg*h/L    InsightRX Prediction of Current Vancomycin Regimen  Regimen: 1000 mg IV every 12 hours.  Exposure target: AUC24 (range)400-600 mg/L.hr   AUC24,ss: 672 mg/L.hr  Probability of AUC24 > 400: 100 %  Ctrough,ss: 23.6 mg/L  Probability of Ctrough,ss > 20: 76 %  Probability of nephrotoxicity (Lodise KANCHAN ): 24 %      Current estimated CrCl = Estimated Creatinine Clearance: 71.9 mL/min (A) (based on SCr of 1.32 mg/dL (H)).    Creatinine for last 3 days  2022:  3:41 PM Creatinine 1.18 mg/dL  2022:  6:31 AM Creatinine 1.14 mg/dL  2022:  7:59 AM Creatinine 1.27 mg/dL  2022:  6:52 AM Creatinine 1.32 mg/dL    Recent Vancomycin Levels (past 3 days)  2022:  6:52 AM Vancomycin 21.5 mg/L    Vancomycin IV Administrations (past 72 hours)                   vancomycin (VANCOCIN) 1000 mg in dextrose 5% 200 mL PREMIX (mg) 1,000 mg New Bag 22 2121     1,000 mg New Bag  1011     1,000 mg New Bag 22 2136     1,000 mg New Bag  0812     1,000 mg New Bag 22                Nephrotoxins and other renal medications (From now, onward)    Start     Dose/Rate Route Frequency Ordered Stop    22 1400  vancomycin 1500 mg in 0.9% NaCl 250 ml intermittent infusion 1,500 mg         1,500 mg  over 90 Minutes Intravenous EVERY 24 HOURS 22 0834      22 0800  lisinopril (ZESTRIL) tablet 2.5 mg         2.5 mg Oral DAILY 22 2256      22 0200  piperacillin-tazobactam (ZOSYN) 3.375 g vial to attach to  mL bag        Note to Pharmacy: For [facility], [facility] and [facility]: For Zosyn-naive patients, use the \"Zosyn initial dose + extended infusion\" order panel.    3.375 g  over 30 Minutes Intravenous " EVERY 6 HOURS 01/04/22 2237 01/04/22 2300  furosemide (LASIX) tablet 40 mg         40 mg Oral 2 TIMES DAILY 01/04/22 2256               Contrast Orders - past 72 hours (72h ago, onward)            None          Interpretation of levels and current regimen:  Vancomycin level is reflective of AUC greater than 600    Has serum creatinine changed greater than 50% in last 72 hours: No    Urine output:  unable to determine    Renal Function: Stable    InsightRX Prediction of Planned New Vancomycin Regimen  Regimen: 1500 mg IV every 24 hours.  Exposure target: AUC24 (range)400-600 mg/L.hr   AUC24,ss: 512 mg/L.hr  Probability of AUC24 > 400: 91 %  Ctrough,ss: 15.5 mg/L  Probability of Ctrough,ss > 20: 17 %  Probability of nephrotoxicity (Lodise KANCHAN 2009): 11 %      Plan:  1. Decrease Dose to 1500 mg IV every 24 hours.  2. Vancomycin monitoring method: AUC  3. Vancomycin therapeutic monitoring goal: 400-600 mg*h/L  4. Pharmacy will check vancomycin levels as appropriate in 1-3 Days.  5. Serum creatinine levels will be ordered every 48 hours.    Joselyn López PharmD, BCPS  1/7/2022 8:38 AM

## 2022-01-07 NOTE — PROGRESS NOTES
Orthopaedic Surgery Progress Note 01/07/2022    S: No acute events overnight.  Pain stable. No fevers. No new concerns. Patient reports that he normally follows with Podiatry in [place]. He does have an appt scheduled this afternoon which will likely need to be rescheduled.     O:  Temp: 97.6  F (36.4  C) Temp src: Oral BP: (!) 144/82 Pulse: 96   Resp: 18 SpO2: 93 % O2 Device: None (Room air)      Exam:  Gen: No acute distress, resting comfortably in bed.  Resp: Non-labored breathing  MSK:  LLE:  - Dressing to toe c/d/i    Recent Labs   Lab 01/07/22  0652 01/06/22  0759 01/05/22  0631 01/04/22  1541   WBC 3.7* 4.8 5.3 4.8   HGB 10.0* 10.0* 10.3* 10.3*    242 243 249   CRP 9.0*  --  16.0* 24.0*     Culture from wound swab: no organisms, 4+ PMNs    Assessment: Jayro Elder is a 71 year old male with diabetes, peripheral neuropathy, coronary artery disease presenting with a left great toe infection, wound that probes to bone, and xrays with tiny cortical erosion. Overall picture is consistent with osteomyelitis of the left great toe. Cultures pending. OK to discharge and follow up outpatient with podiatry.     Orthopedics will follow peripherally.      RECOMMENDATIONS:   - Admit to Medicine.  - Recommend wound care consult given pressure wound between toes. OK to remove bandage placed over toe in ED.   - Plan for OR: none at this time, plan for treatment with IV antibiotics, should this fail would consider possible amputation at that time  - Anticoagulation/DVT Prophylaxis: continue  - Antibiotics/Tetanus: per primary team/ID  - X-rays/Imaging: complete  - Activity: OK for out of bed activity  - Weight bearing: OK to weight bear through heel; please obtain hard soled shoe prior to walking  - Pain control: per primary team  - Diet: OK for diet  - Follow-up: Outpatient with Podiatry for wound cares. Sees a podiatrist in [place], follow up outpatient   - Disposition: floor     Assessment and Plan  discussed with Dr. Medina, Orthopaedic Surgery Staff.     SAVANNA HERNANDEZ PA-C  1/7/2022 11:33 AM  Orthopaedic Surgery     Thank you for allowing me to participate in this patient's care. Please page me directly any questions/concerns.   Securely message with the Vocera Web Console (learn more here)  Text page via [employer] Paging/Directory    If there is no response, if it is a weekend, or if it is during evening hours, please page the orthopaedic surgery resident on call via [employer] Paging/Directory

## 2022-01-07 NOTE — PLAN OF CARE
"BP (!) 144/82 (BP Location: Right arm)   Pulse 96   Temp 97.6  F (36.4  C) (Oral)   Resp 18   Ht 1.93 m (6' 4\")   Wt 117.5 kg (259 lb)   SpO2 93%   BMI 31.53 kg/m      Neuro: A&Ox4.   Cardiac: AVSS.   Respiratory: Sating 93% on RA.  GI/: Voiding in bedside urinal with adequate urine output. No BM   Diet/appetite: Mod CHO, cardiac diet. No appetite. C/O nausea. MD paged for anti nausea med  Activity: Up with SBA  Pain: Tylenol given x1 for left great toe pain   Skin: L great toe wound dressing change due tomorrow 1/8/2021  LDA's: L PIV tko for med     Plan: Continue with POC. Notify primary team with changes.    "

## 2022-01-08 ENCOUNTER — PATIENT OUTREACH (OUTPATIENT)
Dept: CARE COORDINATION | Facility: CLINIC | Age: 72
End: 2022-01-08
Payer: COMMERCIAL

## 2022-01-08 DIAGNOSIS — Z71.89 OTHER SPECIFIED COUNSELING: ICD-10-CM

## 2022-01-08 NOTE — PROGRESS NOTES
Patient discharged to home with family. Patient education completed. Patient four prescriptions were  by writer from pharmacy and delivered to patient's room. PIV was removed. Patient was sent home with supplies to help manage the wound on his foot. Hospital transport was called to help patient to get to the lobby.

## 2022-01-08 NOTE — PROGRESS NOTES
Clinic Care Coordination Contact  [facility]: Post-Discharge Note  SITUATION                                                      Admission:    Admission Date: 01/04/22   Reason for Admission: worsening left great toe wound concerning for osteomyelitis.  Discharge:   Discharge Date: 01/07/22  Discharge Diagnosis: Left 1st Toe Osteomyelitis, Ischemic Cardiomyopathy    BACKGROUND                                                      Jayro Elder is a 71 year old male who has a history of T2DM (A1c 8.5 12/28/21), A fib, CAD, HTN, HLD, CKD IIIa, CHF (EF 41% 12/28/21), prior MI, CVA, who is admitted for worsening left great toe wound concerning for osteomyelitis.  He was evaluated at [place] by vascular 12/2021where they noted he had a callus over his left great toe with ecchymosis beneath that was bluntly removed with no blood or ulcer present underneath.  On 12/27, erythema was noted and he received IV Ancef before de-escalating to PO Keflex. He has history of left third toe osteomyelitis s/p amputation, excisional debridement in May 2021. Bone culture at that time grew MSSA, Finegoldia magna, Porphyromonas species. CRP 24. Admission Xray showed erosion concerning for osteomyelitis.  Orthopedic surgery was consulted and no surgical intervention indicated at this time.  Initially, patient was initiated on broad IV antibiotics with IV vancomycin and Zosyn.  However, that was transitioned to oral levofloxacin, doxycycline and metronidazole despite negative wound culture.  Infectious disease saw patient and will plan to continue this oral regimen for the next 6 weeks with follow-up in clinic.      ASSESSMENT      Enrollment  Primary Care Care Coordination Status: Not a Candidate    Discharge Assessment  How are you doing now that you are home?: better  How are your symptoms? (Red Flag symptoms escalate to triage hotline per guidelines): Improved  Do you feel your condition is stable enough to be safe at home until  your provider visit?: Yes  Does the patient have their discharge instructions? : Yes  Does the patient have questions regarding their discharge instructions? : No  Were you started on any new medications or were there changes to any of your previous medications? : Yes  Does the patient have all of their medications?: Yes  Do you have questions regarding any of your medications? : No  Do you have all of your needed medical supplies or equipment (DME)?  (i.e. oxygen tank, CPAP, cane, etc.): Yes  Discharge follow-up appointment scheduled within 14 calendar days? : No  Is patient agreeable to assistance with scheduling? : No         PLAN                                                      Outpatient Plan:  [  ]  Complete 6 week course of antibiotics (levofloxacin, doxycycline and metronidazole) for osteomyelitis  [  ]  Follow up with Infectious Disease in Clinic (referral ordered) in 6 weeks  [  ]  Follow up with outpatient Podiatrist for wound cares    Future Appointments   Date Time Provider Department Center   2/8/2022  1:00 PM Miracle Eric MD [facility]   3/7/2022 12:30 PM Goltz, Bennett Ezra, PA-C [facility] SLEEP         For any urgent concerns, please contact our 24 hour nurse triage line: [phone] ([phone])         Shena Cain MA

## 2022-01-10 LAB
BACTERIA BLD CULT: NO GROWTH
BACTERIA BLD CULT: NO GROWTH

## 2022-01-11 DIAGNOSIS — I25.119 CORONARY ARTERY DISEASE INVOLVING NATIVE CORONARY ARTERY OF NATIVE HEART WITH ANGINA PECTORIS (H): ICD-10-CM

## 2022-01-11 DIAGNOSIS — I10 ESSENTIAL HYPERTENSION: ICD-10-CM

## 2022-01-11 DIAGNOSIS — I50.23 ACUTE ON CHRONIC SYSTOLIC CONGESTIVE HEART FAILURE (H): Primary | ICD-10-CM

## 2022-01-11 NOTE — PROGRESS NOTES
[facility]    Chart Reviewed after pt was recently hospitalized.  LOV w/ Vazquez Christensen on 8/3/21 states to RTC to see Dr. King in 6 months.      Orders placed and messaged scheduling requesting they contact [facility] to arrange OV w/ Dr. King with labs prior.     Rosio Mack RN BSN   [facility] [place] [place]  [facility] Clinic Care Coordinator  01/11/22, 12:36 PM

## 2022-01-18 NOTE — LETTER
"    1/18/2022         RE: Jayro Elder  [address] [place] [address]        Dear Colleague,    Thank you for referring your patient, Jayro Elder, to the [facility] PODIATRY. Please see a copy of my visit note below.    Podiatry / Foot and Ankle Surgery Progress Note    January 18, 2022    Subject: Patient was seen for follow-up on left great toe ulceration.  States that he was in the hospital recently where he was told he had bone infection and to follow-up with his podiatrist.  He is currently on 3 antibiotic he notes.  Notes he has been getting dizzy spells constantly.  Denies fever, nausea, vomiting.    Objective:  Vitals: /67   Pulse 97   Ht 1.93 m (6' 4\")   Wt 108.4 kg (239 lb)   BMI 29.09 kg/m    BMI= Body mass index is 29.09 kg/m .    A1C: 8.5 (12/2021)    General:  Patient is alert and orientated.  States he has dizzy and appears pale.    Vascular:  DP and PT pulses are palpable.  Edema to left foot noted.  No varicosities noted.  CFT's < 3secs.  Skin temp is normal.    Neuro:  Light and gross touch sensation absent to feet.    Derm: Full-thickness ulceration to the dorsal aspect of the left great toe measuring 0.2 cm x 0.2 cm x 0.2 cm.  This probes to bone.  No purulent drainage or malodor noted.  There is another ulceration to the plantar aspect of the right IPJ after debridement measuring 1.3 cm x 0.6 cm x 0.1 cm.  This does not probe to bone and only to subcutaneous tissue.  No surrounding erythema purulent drainage or malodor noted.    Musculoskeletal: Significant decreased range of motion of left first metatarsal phalangeal joint.    Imaging: left foot xray (1/4/2022) - I personally reviewed the xrays -  Amputation of the distal end of the 2nd and 3rd toes. Soft tissue swelling in the great toe. Mild to moderate multifocal osteoarthrosis. Plantar calcaneal enthesophyte. Calcifications in the region of the plantar fascia. Arterial "   calcifications. Small osteochondroma of the dorsal aspect of the base of the proximal phalanx of the great toe again noted. Otherwise negative. No radiographic evidence of osteomyelitis.      Assessment:     Diabetic polyneuropathy associated with type 2 diabetes mellitus (H)  Ulcer of left foot with fat layer exposed (H)  Hallux limitus of left foot    Medical Decision Making/Plan: At this time the ulcer's were debrided.  Please see procedure note below.  I do not see any x-ray indication of bone infection.  We will have him do iodine dressing changes to the ulcers daily with a bandage.  At his most recent hospitalization he was told he had osteomyelitis and was started on antibiotics to see if this would help.  Talked about ordering an MRI to further assess.  If there is bone infection recommend removal of the bone surgically which means amputation.  Patient is open to this.  Recommend that he follow-up with his primary care doctor at [place] given his continued chronic dizziness and now emesis from it.  Recommend he go to the hospital if it continues.  We will call him with the MRI results.  All questions were answered to patient satisfaction and he will call for the questions or concerns.      Patient was dizzy today and had emesis x2.    Procedure: After verbal consent, excisional debridement was performed on ulcer.  #15 blade was used to debride ulcer down to and including subcutaneous tissue. Bleeding controlled with light pressure.   No drainage noted.  No anesthesia was used due to neuropathy. Dry dressing applied to foot.  Patient tolerated procedure well.      Patient Risk Factor:  Patient is a high risk factor for infection.     Татьяна Mckeon DPM, Podiatry/Foot and Ankle Surgery        Again, thank you for allowing me to participate in the care of your patient.        Sincerely,        Татьяна Mckeon DPM, Podiatry/Foot and Ankle Surgery

## 2022-01-18 NOTE — TELEPHONE ENCOUNTER
Left voicemail on cell asking for a return call.     There is a consent to communicate on file to speak with wife, Aixa, on the home number.     Phone call to home number and spoke with wife, Aixa. She was informed of the need for an MRI of left foot.   [facility] Scheduling number provided. She was informed that we will call with results and plan of care.   She verbalized understanding.     ROLANDA Bates RN

## 2022-01-18 NOTE — TELEPHONE ENCOUNTER
Saw in patients notes that he was told he has bone infection because his wound probed to bone by doctor at the [facility].     I will order an MRI to further assess for bone infection.     Please have patient call to schedule MRI at: [phone].    Thanks,    DAVID AdamesM

## 2022-01-18 NOTE — PROGRESS NOTES
"Podiatry / Foot and Ankle Surgery Progress Note    January 18, 2022    Subject: Patient was seen for follow-up on left great toe ulceration.  States that he was in the hospital recently where he was told he had bone infection and to follow-up with his podiatrist.  He is currently on 3 antibiotic he notes.  Notes he has been getting dizzy spells constantly.  Denies fever, nausea, vomiting.    Objective:  Vitals: /67   Pulse 97   Ht 1.93 m (6' 4\")   Wt 108.4 kg (239 lb)   BMI 29.09 kg/m    BMI= Body mass index is 29.09 kg/m .    A1C: 8.5 (12/2021)    General:  Patient is alert and orientated.  States he has dizzy and appears pale.    Vascular:  DP and PT pulses are palpable.  Edema to left foot noted.  No varicosities noted.  CFT's < 3secs.  Skin temp is normal.    Neuro:  Light and gross touch sensation absent to feet.    Derm: Full-thickness ulceration to the dorsal aspect of the left great toe measuring 0.2 cm x 0.2 cm x 0.2 cm.  This probes to bone.  No purulent drainage or malodor noted.  There is another ulceration to the plantar aspect of the right IPJ after debridement measuring 1.3 cm x 0.6 cm x 0.1 cm.  This does not probe to bone and only to subcutaneous tissue.  No surrounding erythema purulent drainage or malodor noted.    Musculoskeletal: Significant decreased range of motion of left first metatarsal phalangeal joint.    Imaging: left foot xray (1/4/2022) - I personally reviewed the xrays -  Amputation of the distal end of the 2nd and 3rd toes. Soft tissue swelling in the great toe. Mild to moderate multifocal osteoarthrosis. Plantar calcaneal enthesophyte. Calcifications in the region of the plantar fascia. Arterial   calcifications. Small osteochondroma of the dorsal aspect of the base of the proximal phalanx of the great toe again noted. Otherwise negative. No radiographic evidence of osteomyelitis.      Assessment:     Diabetic polyneuropathy associated with type 2 diabetes mellitus " (H)  Ulcer of left foot with fat layer exposed (H)  Hallux limitus of left foot    Medical Decision Making/Plan: At this time the ulcer's were debrided.  Please see procedure note below.  I do not see any x-ray indication of bone infection.  We will have him do iodine dressing changes to the ulcers daily with a bandage.  At his most recent hospitalization he was told he had osteomyelitis and was started on antibiotics to see if this would help.  Talked about ordering an MRI to further assess.  If there is bone infection recommend removal of the bone surgically which means amputation.  Patient is open to this.  Recommend that he follow-up with his primary care doctor at [place] given his continued chronic dizziness and now emesis from it.  Recommend he go to the hospital if it continues.  We will call him with the MRI results.  All questions were answered to patient satisfaction and he will call for the questions or concerns.      Patient was dizzy today and had emesis x2.    Procedure: After verbal consent, excisional debridement was performed on ulcer.  #15 blade was used to debride ulcer down to and including subcutaneous tissue. Bleeding controlled with light pressure.   No drainage noted.  No anesthesia was used due to neuropathy. Dry dressing applied to foot.  Patient tolerated procedure well.      Patient Risk Factor:  Patient is a high risk factor for infection.     Татьяна Mckeon DPM, Podiatry/Foot and Ankle Surgery

## 2022-01-24 NOTE — TELEPHONE ENCOUNTER
Received refill request for:   Lasix                             Last OV was:   8/3/21 with Vazquez Christensen CNP  Labs/EKG:  BMP 1/7/22  F/U scheduled:   2 nd message to scheduling to make appt, pt over due to see [facility].   New script sent to:   [facility]      Future Appointments   Date Time Provider Department Center   1/26/2022 11:00 AM Татьяна Mckeon DPM, Podiatry/Foot and Ankle Surgery [facility]   1/29/2022  1:30 PM [facility]   3/7/2022 12:30 PM Goltz, Bennett Ezra, [facility] SLEEP     Princess Chapa, RN 1:25 PM 01/24/22

## 2022-01-24 NOTE — TELEPHONE ENCOUNTER
[facility] Call Center    Phone Message    May a detailed message be left on voicemail: yes     Reason for Call: Medication Refill Request    Has the patient contacted the pharmacy for the refill? Yes   Name of medication being requested: Lasix 40 mg  Provider who prescribed the medication: Dr. Christensen  Pharmacy: [facility] pharmacy  Date medication is needed: 01/24/2022   Pt would like a refill but unable to come in for soon appt and the other appts were to late in the day      Action Taken: Message routed to:  [facility] Cardio    Travel Screening: Not Applicable

## 2022-01-24 NOTE — TELEPHONE ENCOUNTER
Routing refill request to provider for review/approval because:  Drug not on the [facility] refill protocol     Tatyana Farooq RN   [facility]  -- Triage Nurse

## 2022-01-26 NOTE — LETTER
"    1/26/2022         RE: Jayro Elder  [address] [place] [address]        Dear Colleague,    Thank you for referring your patient, Jayro Elder, to the [facility] PODIATRY. Please see a copy of my visit note below.    Podiatry / Foot and Ankle Surgery Progress Note    January 26, 2022    Subject: Patient was seen for follow-up on left foot ulcers.  He has not gotten his MRI yet.  He gets that this weekend.  Notes that the nausea and dizziness has gone away and he feels a lot better than his last visit.  He states that the nurse is concerned because the toe has been draining more however he has not noticed it.  No pain but has baseline neuropathy.    Objective:  Vitals: /72   Ht 1.93 m (6' 4\")   Wt 108.4 kg (239 lb)   BMI 29.09 kg/m      A1C: 8.5 (12/2021)     General:  Patient is alert and orientated.  States he has dizzy and appears pale.     Vascular:  DP and PT pulses are palpable.  Edema to left foot noted.  No varicosities noted.  CFT's < 3secs.  Skin temp is normal.     Neuro:  Light and gross touch sensation absent to feet.     Derm: Full-thickness ulceration to the dorsal aspect of the left great toe measuring 0.2 cm x 0.2 cm x 0.2 cm.  This probes to bone.  No purulent drainage or malodor noted.  There is another ulceration to the plantar aspect of the right IPJ has now healed and there is no opening noted today after debridement..    There is a thickened preulcerative hyperkeratotic lesion to the plantar aspect of the left first met head.  Upon debridement no open ulcers are noted.     Musculoskeletal: Significant decreased range of motion of left first metatarsal phalangeal joint.     Imaging: left foot xray (1/4/2022) - I personally reviewed the xrays -  Amputation of the distal end of the 2nd and 3rd toes. Soft tissue swelling in the great toe. Mild to moderate multifocal osteoarthrosis. Plantar calcaneal enthesophyte. Calcifications in the " region of the plantar fascia. Arterial   calcifications. Small osteochondroma of the dorsal aspect of the base of the proximal phalanx of the great toe again noted. Otherwise negative. No radiographic evidence of osteomyelitis.        Assessment:    Diabetic polyneuropathy associated with type 2 diabetes mellitus (H)  Skin ulcer of left great toe with fat layer exposed (H)  Pre-ulcerative calluses  Hallux limitus, left       Medical Decision Making/Plan: At this time the ulcer's were debrided.  Please see procedure note below.    We will have him continue with the iodine dressings daily.  We will call him with the MRI results.  Discussed that if there is bone infection we will need to surgically remove the toe.  Hopefully we can do this as an outpatient as long as he does not develop systemic signs of infection.  If there is no bone infection we will continue to treat the wounds nonsurgically. All questions were answered to patient satisfaction and he will call for the questions or concerns.     Procedure: After verbal consent, excisional debridement was performed on ulcer.  #15 blade was used to debride ulcer down to and including subcutaneous tissue. Bleeding controlled with light pressure.   No drainage noted.  No anesthesia was used due to neuropathy. Dry dressing applied to foot.  Patient tolerated procedure well.        Patient Risk Factor:  Patient is a high risk factor for infection.     Татьяна Mckeon DPM, Podiatry/Foot and Ankle Surgery              Again, thank you for allowing me to participate in the care of your patient.        Sincerely,        Татьяна Mckeon DPM, Podiatry/Foot and Ankle Surgery

## 2022-01-26 NOTE — PROGRESS NOTES
"Podiatry / Foot and Ankle Surgery Progress Note    January 26, 2022    Subject: Patient was seen for follow-up on left foot ulcers.  He has not gotten his MRI yet.  He gets that this weekend.  Notes that the nausea and dizziness has gone away and he feels a lot better than his last visit.  He states that the nurse is concerned because the toe has been draining more however he has not noticed it.  No pain but has baseline neuropathy.    Objective:  Vitals: /72   Ht 1.93 m (6' 4\")   Wt 108.4 kg (239 lb)   BMI 29.09 kg/m      A1C: 8.5 (12/2021)     General:  Patient is alert and orientated.  States he has dizzy and appears pale.     Vascular:  DP and PT pulses are palpable.  Edema to left foot noted.  No varicosities noted.  CFT's < 3secs.  Skin temp is normal.     Neuro:  Light and gross touch sensation absent to feet.     Derm: Full-thickness ulceration to the dorsal aspect of the left great toe measuring 0.2 cm x 0.2 cm x 0.2 cm.  This probes to bone.  No purulent drainage or malodor noted.  There is another ulceration to the plantar aspect of the right IPJ has now healed and there is no opening noted today after debridement..    There is a thickened preulcerative hyperkeratotic lesion to the plantar aspect of the left first met head.  Upon debridement no open ulcers are noted.     Musculoskeletal: Significant decreased range of motion of left first metatarsal phalangeal joint.     Imaging: left foot xray (1/4/2022) - I personally reviewed the xrays -  Amputation of the distal end of the 2nd and 3rd toes. Soft tissue swelling in the great toe. Mild to moderate multifocal osteoarthrosis. Plantar calcaneal enthesophyte. Calcifications in the region of the plantar fascia. Arterial   calcifications. Small osteochondroma of the dorsal aspect of the base of the proximal phalanx of the great toe again noted. Otherwise negative. No radiographic evidence of osteomyelitis.        Assessment:    Diabetic " polyneuropathy associated with type 2 diabetes mellitus (H)  Skin ulcer of left great toe with fat layer exposed (H)  Pre-ulcerative calluses  Hallux limitus, left       Medical Decision Making/Plan: At this time the ulcer's were debrided.  Please see procedure note below.    We will have him continue with the iodine dressings daily.  We will call him with the MRI results.  Discussed that if there is bone infection we will need to surgically remove the toe.  Hopefully we can do this as an outpatient as long as he does not develop systemic signs of infection.  If there is no bone infection we will continue to treat the wounds nonsurgically. All questions were answered to patient satisfaction and he will call for the questions or concerns.     Procedure: After verbal consent, excisional debridement was performed on ulcer.  #15 blade was used to debride ulcer down to and including subcutaneous tissue. Bleeding controlled with light pressure.   No drainage noted.  No anesthesia was used due to neuropathy. Dry dressing applied to foot.  Patient tolerated procedure well.        Patient Risk Factor:  Patient is a high risk factor for infection.     Татьяна Mckeon DPM, Podiatry/Foot and Ankle Surgery

## 2022-02-02 NOTE — PROGRESS NOTES
"  Assessment & Plan     Tinnitus, bilateral  Has had for years and worsening.  Worse at night as he cant hear TV or have  Conversation.    - Otolaryngology Referral; Future    Impacted cerumen of right ear  irrigated with warm water by MA and cerumen removed.       [id number]}     BMI:   Estimated body mass index is 31.62 kg/m  as calculated from the following:    Height as of this encounter: 1.905 m (6' 3\").    Weight as of this encounter: 114.8 kg (253 lb).    Weight management plan: Patient referred to endocrine and/or weight management specialty        Return in about 1 week (around 2/9/2022), or if symptoms worsen or fail to improve.    Ramona Ann Aaseby-Aguilera, PA-C  [facility] Hospital[facility]ONORIO Dial is a 71 year old who presents for the following health issues     HPI     Ear issues. States that he has ringing in his ears for year.  For 2-3 years.  Getting worse and in evening it gets so loud cant hear anything           Review of Systems   Constitutional, HEENT, cardiovascular, pulmonary, gi and gu systems are negative, except as otherwise noted.      Objective    BP 98/58   Pulse 77   Temp 97.9  F (36.6  C) (Oral)   Resp 20   Ht 1.905 m (6' 3\")   Wt 114.8 kg (253 lb)   BMI 31.62 kg/m    Body mass index is 31.62 kg/m .  Physical Exam   GENERAL: healthy, alert and no distress  HENT: right ear: occluded with wax, left ear: normal: no effusions, no erythema, normal landmarks, nose and mouth without ulcers or lesions, oropharynx clear and oral mucous membranes moist  RESP: lungs clear to auscultation - no rales, rhonchi or wheezes                "

## 2022-02-02 NOTE — TELEPHONE ENCOUNTER
Scheduled surgery    No covid test needed. Patient tested positive on 1/7/22    Type of surgery: left great toe amputation   Location of surgery: [facility]  Date and time of surgery: 2/14/22  Surgeon: Mike  Pre-Op Appt Date: 2/8/22  Post-Op Appt Date: 2/22/22   Packet sent out: Yes  Pre-cert/Authorization completed:  No  Date: 2.2.22      [facility] Scheduler

## 2022-02-08 NOTE — TELEPHONE ENCOUNTER
Please call patient regarding holding of anticoagulants.    He should hold PLAVIX beginning tomorrow. Last dose today.    He should hold ELIQUIS beginning Sunday. Last dose would be Saturday.     As I discussed with him, while holding the medications monitor for chest pain, sudden shortness of breath, leg swelling etc

## 2022-02-08 NOTE — PROGRESS NOTES
[facility]  [address] [facility] [address]  Phone: [phone]  Primary Provider: Joslyn Pierre  Pre-op Performing Provider: JOSLYN PIERRE      PREOPERATIVE EVALUATION:  Today's date: 2/8/2022    Jayro Elder is a 71 year old male who presents for a preoperative evaluation.    Surgical Information:  Surgery/Procedure: Partial left great toe amputation  Surgery Location: [facility]  Surgeon:   Surgery Date: 2/14/2022  Time of Surgery: 1:10pm  Where patient plans to recover: At home with family  Fax number for surgical facility: Note does not need to be faxed, will be available electronically in Epic.  Type of Anesthesia Anticipated: Local with MAC    Assessment & Plan     The proposed surgical procedure is considered INTERMEDIATE risk.    Preop general physical exam  Osteomyelitis of left foot, unspecified type (H)  Diabetic ulcer of left midfoot associated with type 2 diabetes mellitus, with fat layer exposed (H)  Status post amputation of lesser toe of left foot (H)  Polyneuropathy associated with underlying disease (H)  Type 2 diabetes mellitus with diabetic peripheral angiopathy without gangrene, with long-term current use of insulin (H)  Multiple iterations of similar disease forms. Reviewed with him the importance of treating diabetes to help with his recurrent disease. He will need amputation above to preserve further loss. Ok for planned procedure.     Chronic systolic (congestive) heart failure (H)  Chronic atrial fibrillation (H)  Coronary artery disease involving native coronary artery of native heart with angina pectoris (H)  He is stable today without evidence for dysrhythmia or decompensation. During his hospitalization in early January of 2022 he had a repeat angiogram which showed no advancement of his ascvd. He is cleared for surgery. He will need to hold both plavix (over 2 years out from most recent stenting) and his eliquis (a  fib). Reviewed risks and what to monitor for while off. These should be restarted per surgeon    Stage 3a chronic kidney disease (H)  Stable per labs during his ED       Implanted Device:  This is simply a freestyle Joey      Risks and Recommendations:  The patient has the following additional risks and recommendations for perioperative complications:   - Consult Hospitalist / IM to assist with post-op medical management  Cardiovascular:  See above  Diabetes:  - Patient is on insulin therapy; diabetic NPO guidelines provided and discussed.  Anemia/Bleeding/Clotting:    - reviewed stoppage of his DOAC/APT    Medication Instructions:  See pt instructions    RECOMMENDATION:  APPROVAL GIVEN to proceed with proposed procedure, without further diagnostic evaluation.      Greater than 45 minutes spend during charting, the exam, discussion and chart review.       Subjective     HPI related to upcoming procedure: osteomyelitis, left foot    Preop Questions 2/8/2022   1. Have you ever had a heart attack or stroke? YES - 10 stents   2. Have you ever had surgery on your heart or blood vessels, such as a stent placement, a coronary artery bypass, or surgery on an artery in your head, neck, heart, or legs? YES - as above   3. Do you have chest pain with activity? YES - angiogram last month   4. Do you have a history of  heart failure? YES - 2/2 a fib   5. Do you currently have a cold, bronchitis or symptoms of other infection? No   6. Do you have a cough, shortness of breath, or wheezing? No   7. Do you or anyone in your family have previous history of blood clots? YES - 2/2 a fib causing CVA   8. Do you or does anyone in your family have a serious bleeding problem such as prolonged bleeding following surgeries or cuts? No   9. Have you ever had problems with anemia or been told to take iron pills? YES    10. Have you had any abnormal blood loss such as black, tarry or bloody stools? No   11. Have you ever had a blood  transfusion? YES   11a. Have you ever had a transfusion reaction? No   12. Are you willing to have a blood transfusion if it is medically needed before, during, or after your surgery? Yes   13. Have you or any of your relatives ever had problems with anesthesia? No   14. Do you have sleep apnea, excessive snoring or daytime drowsiness? YES - MARISOL   14a. Do you have a CPAP machine? Yes   15. Do you have any artifical heart valves or other implanted medical devices like a pacemaker, defibrillator, or continuous glucose monitor? YES -    15a. What type of device do you have? Joey 2   15b. Name of the clinic that manages your device:  [facility]   16. Do you have artificial joints? No   17. Are you allergic to latex? No       Health Care Directive:  Patient does not have a Health Care Directive or Living Will: Discussed advance care planning with patient; however, patient declined at this time.    Preoperative Review of :   reviewed - Tylenol #3 on 9/28/21 is most recent in past 3 months    Review of Systems  CONSTITUTIONAL: NEGATIVE for fever, chills, change in weight  INTEGUMENTARY/SKIN: POSITIVE for ulceration to the great left toe/foot  EYES: POSITIVE for worsening vision  ENT/MOUTH: NEGATIVE for ear, mouth and throat problems  RESP: NEGATIVE for significant cough or SOB  CV: POSITIVE for recent ED visit for chest pain; Work up stable; improved sxs   GI: NEGATIVE for nausea, abdominal pain, heartburn, or change in bowel habits  : NEGATIVE for frequency, dysuria, or hematuria  MUSCULOSKELETAL:POSITIVE  for great left toe pain  NEURO: POSITIVE for dizziness with the increased imdur dosing   ENDOCRINE: NEGATIVE for temperature intolerance, skin/hair changes  HEME: NEGATIVE for bleeding problems  PSYCHIATRIC: NEGATIVE for changes in mood or affect    Patient Active Problem List    Diagnosis Date Noted     Nausea 01/07/2022     Priority: Medium     Toe infection 01/04/2022     Priority: Medium      Lightheadedness 12/27/2021     Priority: Medium     Chest pain 12/27/2021     Priority: Medium     Weakness 12/27/2021     Priority: Medium     Dyspnea 12/27/2021     Priority: Medium     Chronic kidney disease, stage 3 (H) 06/24/2021     Priority: Medium     Polyneuropathy associated with underlying disease (H) 06/24/2021     Priority: Medium     Diabetic polyneuropathy associated with type 2 diabetes mellitus (H) 05/07/2021     Priority: Medium     Added automatically from request for surgery [id number]       Ulcer of left foot, with necrosis of bone (H) 05/07/2021     Priority: Medium     Added automatically from request for surgery [id number]       Osteomyelitis of third toe of left foot (H) 05/07/2021     Priority: Medium     Added automatically from request for surgery [id number]       Hammertoes of both feet 05/07/2021     Priority: Medium     Added automatically from request for surgery [id number]       H/O amputation of lesser toe, left (H) 05/07/2021     Priority: Medium     Added automatically from request for surgery [id number]       Osteomyelitis (H) 05/07/2021     Priority: Medium     Acute on chronic systolic congestive heart failure (H) 04/07/2021     Priority: Medium     Chest pain, unspecified type 04/07/2021     Priority: Medium     Dizziness 01/03/2021     Priority: Medium     Orthostatic hypotension 08/17/2020     Priority: Medium     Wound infection 07/17/2020     Priority: Medium     Diabetic infection of left foot (H) 07/16/2020     Priority: Medium     H/O amputation of lesser toe, right (H) 01/09/2020     Priority: Medium     Atrial fibrillation, unspecified type (H) 01/09/2020     Priority: Medium     Hypertension 07/15/2019     Priority: Medium     Unstable angina (H) 07/06/2019     Priority: Medium     Acute chest pain 07/05/2019     Priority: Medium     Post-operative state 05/06/2019     Priority: Medium     Acute osteomyelitis (H) 05/05/2019     Priority: Medium     History of CVA  (cerebrovascular accident) 02/05/2019     Priority: Medium     CHF (congestive heart failure) (H) 01/17/2019     Priority: Medium     Bacteremia 09/14/2018     Priority: Medium     Cellulitis 09/06/2018     Priority: Medium     Cellulitis of left lower extremity 03/13/2018     Priority: Medium     Syncope, unspecified syncope type 03/13/2018     Priority: Medium     Diabetic ulcer of left midfoot associated with type 2 diabetes mellitus, with fat layer exposed (H) 03/13/2018     Priority: Medium     Type 2 diabetes mellitus with diabetic peripheral angiopathy without gangrene (H) 03/13/2018     Priority: Medium     Microalbuminuria 03/13/2018     Priority: Medium     X1       Homonymous hemianopsia, right 12/20/2017     Priority: Medium     Chronic atrial fibrillation (H) 06/22/2017     Priority: Medium     Status post coronary angiogram 06/13/2017     Priority: Medium     Coronary artery disease involving native coronary artery of native heart with angina pectoris (H) 06/09/2017     Priority: Medium     CVA (cerebral vascular accident) (H) 04/21/2017     Priority: Medium     Cardiomyopathy (H)      Priority: Medium     Health Care Home 11/18/2013     Priority: Medium       Status:  NA -no care coordination needs 11/19/13  Care Coordinator:  Lashaun Arrington [phone]  See Letters for [facility] Care Plan           Hyperlipidemia 05/11/2011     Priority: Medium     Hypothyroidism 05/11/2011     Priority: Medium     Overview:   Hypothyroidism Acquired       Non-toxic nodular goiter 05/11/2011     Priority: Medium     Overview:   LW Modifier:  biopsy benign x 2  ; Goiter Nodular       CARDIOVASCULAR SCREENING; LDL GOAL LESS THAN 100 10/31/2010     Priority: Medium     Diabetes mellitus, type 2 (H) 07/14/2005     Priority: Medium     Problem list name updated by automated process. Provider to review       Cardiovascular disease 07/14/2005     Priority: Medium     anterior MI,  PTCA and stent placed in mid LAD  Problem  list name updated by automated process. 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     Cardiomyopathy (H)      Cellulitis of left lower extremity 3/13/2018     Cerebral infarction (H)     eye sight decreased in peripheral of right side and blurry     Cerebral infarction (H) 2016     Diabetic ulcer of left midfoot associated with type 2 diabetes mellitus, with fat layer exposed (H) 3/13/2018     Essential hypertension 11/13/2002     Essential hypertension, benign 11/13/2002     Generalized osteoarthrosis, unspecified site 11/13/2002     Lightheadedness 12/27/2021     Microalbuminuria 3/13/2018    X1     Myocardial infarction (H)      Myocardial infarction (H) 06/28/2005     Neuropathy      Neuropathy 2016     Sepsis (H)      Sleep apnea     doesn't use it     Sleep apnea 2006     Substance abuse (H)      Substance abuse (H)      Syncope, unspecified syncope type 3/13/2018     Syncope, unspecified syncope type 03/13/2018     Thyroid disease     takes medicine     Thyroid disease 2005     Tobacco use disorder 11/13/2002     Type 2 diabetes mellitus (H) 2000     Type 2 diabetes mellitus with diabetic peripheral angiopathy without gangrene, unspecified long term insulin use status 2005     Past Surgical History:   Procedure Laterality Date     AMPUTATE TOE(S) Right 1/6/2019    Procedure: Right open second toe  partial amputation;  Surgeon: Sabino Garcia DPM;  Location: [facility] OR     AMPUTATE TOE(S) Right 1/8/2019    Procedure: 1.  Revision right second toe amputation with resection of distal half of proximal phalanx with full closure.    2.  Debridement of callus/preulcerative lesion, distal left second toe and plantar left first metatarsophalangeal joint.;  Surgeon: Ryan Bhagat DPM;  Location: [facility] OR     AMPUTATE TOE(S) Right 3/12/2019    Procedure: Partial right fourth toe amputation.;  Surgeon: Ryan Bhagat DPM;  Location: [facility] OR     AMPUTATE TOE(S) Right 5/6/2019    Procedure: Right partial third toe amputation;  Surgeon: Татьяна Mckeon DPM, Podiatry/Foot and Ankle Surgery;  Location: [facility] OR     AMPUTATE TOE(S) Left 4/18/2020    Procedure: LEFT SECOND TOE AMPUTATION;  Surgeon: Ryan Bhagat DPM;  Location: [facility] OR     AMPUTATE TOE(S) Left 5/8/2021    Procedure: 1.  Amputation of the left third toe at the level of the proximal interphalangeal joint. 2.  Excisional debridement of less than 20 square cm down to the level of the deeper skin, plantar left foot.;  Surgeon: Kwasi Root DPM;  Location: [facility] OR     AMPUTATE TOE(S) Right 2019    4 toes amputation      ANGIOGRAM  6/29/05    subtotal occ.mid LAD,SUSAN mid LAD     ANGIOGRAM  7/05    mild CAD,patent stent,no flow-limiting lesions,sev.LV dysf.LAD enlarged     ANGIOGRAM  2/09    Sev.single vessel disease,Mod LV dysf.distal LAD 90%,70-75% mid lad just before prev stent,SUSAN to prox.mid LAD, endeavor to distal LAD     ANGIOGRAM  11/13/13    restenosis, stent LAD     BACK SURGERY      back surgery x3     CARDIAC CATHETERIZATION  07/08/2019     CARDIAC CATHETERIZATION Left 06/13/2017     CARDIAC CATHETERIZATION  2005,2009,20013     CORONARY STENT PLACEMENT  07/08/2019     CV CORONARY ANGIOGRAM N/A 7/8/2019    Procedure: Coronary Angiogram;  Surgeon: Jose Alfredo Crockett MD;  Location:  [facility] CATH LAB     CV CORONARY ANGIOGRAM N/A  4/9/2021    Procedure: CV CORONARY ANGIOGRAM;  Surgeon: Warren Paulson MD;  Location:  [facility]     CV HEART CATHETERIZATION WITH POSSIBLE INTERVENTION N/A 12/28/2021    Procedure: Coronary Angiogram;  Surgeon: Jose Alfredo Crockett MD;  Location:  [facility]     CV INSTANTANEOUS WAVE-FREE RATIO N/A 12/28/2021    Procedure: Instantaneous Wave-Free Ratio;  Surgeon: Jose Alfredo Crockett MD;  Location:  [facility]     CV LEFT HEART CATH N/A 7/8/2019    Procedure: Left Heart Cath;  Surgeon: Jose Alfredo Crockett MD;  Location:  [facility]     CV LEFT HEART CATH N/A 12/28/2021    Procedure: Left Heart Cath;  Surgeon: Jose Alfredo Crockett MD;  Location:  [facility] C[facility]     CV LEFT VENTRICULOGRAM N/A 12/28/2021    Procedure: Left Ventriculogram;  Surgeon: Jose Alfredo Crockett MD;  Location:  [facility]     CV PCI ASPIRATION THROMECTOMY N/A 7/8/2019    Procedure: PCI Aspiration Thrombectomy;  Surgeon: Jose Alfredo Crockett MD;  Location:  [facility]     CV PCI STENT DRUG ELUTING N/A 7/8/2019    Procedure: PCI Stent Drug Eluting;  Surgeon: Jose Alfredo Crockett MD;  Location:  [facility]     HEART CATH LEFT HEART CATH  06/13/2017    SUSAN to OM3     INCISION AND DRAINAGE TOE, COMBINED Bilateral 9/11/2018    Procedure: COMBINED INCISION AND DRAINAGE TOE;  1) Irrigation and debridement ulcer left great toe  2) Amputation 3rd toe right foot at distal interphalangeal joint level;  Surgeon: Miki Harp MD;  Location:  OR     IRRIGATION AND DEBRIDEMENT FOOT, COMBINED Left 3/12/2019    Procedure: Debridement of left foot ulcer sub first MPJ 2 x 2.5 cm down to and including subcutaneous tissue, callus debridement for preulcerative lesion, left second toe.;  Surgeon: Ryan Bhagat DPM;  Location:  OR     ORTHOPEDIC SURGERY      bunion surgery both feet     ORTHOPEDIC SURGERY      left shoulder     OTHER SURGICAL  HISTORY  9 years old    cancer tumor mouth     SHOULDER ARTHROSCOPY W/ ROTATOR CUFF REPAIR Left 2004     SOFT TISSUE SURGERY      debridement of toe numerous time     VASCULAR SURGERY      7 stents in heart     VASCULAR SURGERY       [facility] NONSPECIFIC PROCEDURE      Laminectomy x 3 - (1983 x 2 & 1990)     [facility] NONSPECIFIC PROCEDURE Bilateral 1998    Bunionectomy     [facility] NONSPECIFIC PROCEDURE  1959    Gingival surgery at age 9     Current Outpatient Medications   Medication Sig Dispense Refill     ammonium lactate (AMLACTIN) 12 % external cream Apply topically 2 times daily 385 g 3     apixaban ANTICOAGULANT (ELIQUIS) 5 MG tablet Take 1 tablet (5 mg) by mouth 2 times daily       atorvastatin (LIPITOR) 40 MG tablet Take 1 tablet (40 mg) by mouth every evening 90 tablet 3     carvedilol (COREG) 25 MG tablet Take 1 tablet (25 mg) by mouth 2 times daily (with meals) 180 tablet 3     clopidogrel (PLAVIX) 75 MG tablet Take 1 tablet (75 mg) by mouth daily 90 tablet 3     Continuous Blood Gluc  (FREESTYLE CLARITA 2 READER SYSTM) DRAGAN 1 Device daily 1 each 3     Continuous Blood Gluc Sensor (FREESTYLE CLARITA 2 SENSOR SYSTM) MISC 1 Units 4 times daily (before meals and nightly) 1 each 3     doxycycline hyclate (VIBRA-TABS) 100 MG tablet Take 1 tablet (100 mg) by mouth 2 times daily Osteomyelitis 84 tablet 0     furosemide (LASIX) 40 MG tablet Take 1 tablet (40 mg) by mouth 2 times daily 60 tablet 0     gabapentin (NEURONTIN) 300 MG capsule TAKE 2-3 CAPSULES (600-900 MG) BY MOUTH AT BEDTIME 180 capsule 1     insulin aspart (NOVOLOG FLEXPEN) 100 UNIT/ML pen Inject Subcutaneous 3 times daily (with meals) Sliding Scale  Do not give sliding scale insulin if Pre-Meal BG less than 140.   For Pre-Meal:   - 200 give 2 units.    - 250 give 4 units.    - 300 give 6 units.    - 350 give 8 units.    - 400 give 10 units.       insulin aspart (NOVOLOG PEN) 100 UNIT/ML pen Inject 12 Units Subcutaneous 2 times  "daily (with meals) With breakfast and lunch       insulin aspart (NOVOLOG PEN) 100 UNIT/ML pen Inject 14 Units Subcutaneous daily (with dinner)       insulin glargine (LANTUS PEN) 100 UNIT/ML pen Inject 44 units subcutaneously once every morning       insulin pen needle (31G X 6 MM) 31G X 6 MM miscellaneous Use  as directed. 100 each 11     isosorbide mononitrate (IMDUR) 60 MG 24 hr tablet Take 1 tablet (60 mg) by mouth daily       levofloxacin (LEVAQUIN) 750 MG tablet Take 1 tablet (750 mg) by mouth daily Osteomyelitis 42 tablet 0     levothyroxine (SYNTHROID, LEVOTHROID) 137 MCG tablet Take 1 tablet by mouth once every evening 90 tablet 3     lisinopril (ZESTRIL) 2.5 MG tablet Take 1 tablet (2.5 mg) by mouth daily 90 tablet 3     metroNIDAZOLE (FLAGYL) 500 MG tablet Take 1 tablet (500 mg) by mouth 3 times daily Osteomyelitis 126 tablet 0     nitroGLYcerin (NITROSTAT) 0.4 MG sublingual tablet For chest pain place 1 tablet under the tongue every 5 minutes for 3 doses. If symptoms persist 5 minutes after 1st dose call 911. 15 tablet 3     pantoprazole (PROTONIX) 40 MG EC tablet TAKE 1 TABLET BY MOUTH EVERY MORNING BEFORE BREAKFAST 90 tablet 1     triamcinolone (KENALOG) 0.1 % external cream Apply topically 2 times daily 30 g 0       Allergies   Allergen Reactions     No Known Allergies         Social History     Tobacco Use     Smoking status: Former Smoker     Packs/day: 1.00     Years: 25.00     Pack years: 25.00     Types: Cigarettes     Start date:      Quit date: 2005     Years since quittin.5     Smokeless tobacco: Never Used   Substance Use Topics     Alcohol use: Yes     Alcohol/week: 4.0 standard drinks     Types: 4 Shots of liquor per week     Comment: OCCASSIONALLY        History   Drug Use No         Objective     /54 (BP Location: Right arm, Patient Position: Chair, Cuff Size: Adult Large)   Pulse 78   Temp 97.8  F (36.6  C) (Oral)   Resp 16   Ht 1.905 m (6' 3\")   Wt 117.3 kg " (258 lb 11.2 oz)   SpO2 99%   BMI 32.34 kg/m      Physical Exam    GENERAL APPEARANCE: healthy, alert and no distress     HENT: ear canals and TM's normal and nose and mouth without ulcers or lesions     RESP: lungs clear to auscultation - no rales, rhonchi or wheezes     CV: regular rates and rhythm; there was no ectopy noted today     ABDOMEN: soft, non-tender     MS/SKIN: the great left toe is edematous, erythematous and warm. I did not inspect the ulcer to the left metatarsal pad     NEURO: mentation intact, speech normal and normal strength/tone     PSYCH:affect normal/bright    Recent Labs   Lab Test 01/07/22  0652 01/06/22  0759 12/29/21  0624 12/28/21  0710 05/09/21  0712 05/08/21  0730 04/15/21  1152 04/10/21  0542 04/09/21  0441 04/08/21  1215   HGB 10.0* 10.0*   < > 9.9*   < > 10.9*   < >  --   --   --     242   < > 175   < > 209   < >  --   --   --    INR  --   --   --   --   --  1.23*  --  1.23*   < >  --     137   < > 136   < > 138   < > 137   < > 137   POTASSIUM 3.9 3.8   < > 3.1*   < > 4.0   < > 3.9   < > 3.8   CR 1.32* 1.27*   < > 1.12   < > 1.09   < > 1.26*   < > 1.27*   A1C  --   --   --  8.5*  --   --   --   --   --  8.1*    < > = values in this interval not displayed.        Diagnostics:  No labs were ordered during this visit.  Reviewed labs from ED; no repeat needed.   No EKG this visit, completed in the last 90 days.    Revised Cardiac Risk Index (RCRI):  The patient has the following serious cardiovascular risks for perioperative complications:   - Coronary Artery Disease (MI, positive stress test, angina, Qs on EKG) = 1 point   - Cerebrovascular Disease (TIA or CVA) = 1 point   - Diabetes Mellitus (on Insulin) = 1 point     RCRI Interpretation: 3 points: Class IV (high risk - >11% complication rate)       Signed Electronically by: Davion Cordova PA-C  Copy of this evaluation report is provided to requesting physician.

## 2022-02-08 NOTE — PATIENT INSTRUCTIONS
Preparing for Your Surgery  Getting started  A nurse will call you to review your health history and instructions. They will give you an arrival time based on your scheduled surgery time. Please be ready to share:    Your doctor's clinic name and phone number    Your medical, surgical and anesthesia history    A list of allergies and sensitivities    A list of medicines, including herbal treatments and over-the-counter drugs    Whether the patient has a legal guardian (ask how to send us the papers in advance)  Please tell us if you're pregnant--or if there's any chance you might be pregnant. Some surgeries may injure a fetus (unborn baby), so they require a pregnancy test. Surgeries that are safe for a fetus don't always need a test, and you can choose whether to have one.   If you have a child who's having surgery, please ask for a copy of Preparing for Your Child's Surgery.    Preparing for surgery    Within 30 days of surgery: Have a pre-op exam (sometimes called an H&P, or History and Physical). This can be done at a clinic or pre-operative center.  ? If you're having a , you may not need this exam. Talk to your care team.    At your pre-op exam, talk to your care team about all medicines you take. If you need to stop any medicines before surgery, ask when to start taking them again.  ? We do this for your safety. Many medicines can make you bleed too much during surgery. Some change how well surgery (anesthesia) drugs work.    Call your insurance company to let them know you're having surgery. (If you don't have insurance, call [phone].)    Call your clinic if there's any change in your health. This includes signs of a cold or flu (sore throat, runny nose, cough, rash, fever). It also includes a scrape or scratch near the surgery site.    If you have questions on the day of surgery, call your hospital or surgery center.  COVID testing  You may need to be tested for COVID-19 before having  surgery. If so, your surgical team will give you instructions for scheduling this test, separate from your preoperative history and physical.  Eating and drinking guidelines  For your safety: Unless your surgeon tells you otherwise, follow the guidelines below.    Eat and drink as usual until 8 hours before surgery. After that, no food or milk.    Drink clear liquids until 2 hours before surgery. These are liquids you can see through, like water, Gatorade and Propel Water. You may also have black coffee and tea (no cream or milk).    Nothing by mouth within 2 hours of surgery. This includes gum, candy and breath mints.    If you drink alcohol: Stop drinking it the night before surgery.    If your care team tells you to take medicine on the morning of surgery, it's okay to take it with a sip of water.  Preventing infection    Shower or bathe the night before and morning of your surgery. Follow the instructions your clinic gave you. (If no instructions, use regular soap.)    Don't shave or clip hair near your surgery site. We'll remove the hair if needed.    Don't smoke or vape the morning of surgery. You may chew nicotine gum up to 2 hours before surgery. A nicotine patch is okay.  ? Note: Some surgeries require you to completely quit smoking and nicotine. Check with your surgeon.    Your care team will make every effort to keep you safe from infection. We will:  ? Clean our hands often with soap and water (or an alcohol-based hand rub).  ? Clean the skin at your surgery site with a special soap that kills germs.  ? Give you a special gown to keep you warm. (Cold raises the risk of infection.)  ? Wear special hair covers, masks, gowns and gloves during surgery.  ? Give antibiotic medicine, if prescribed. Not all surgeries need antibiotics.  What to bring on the day of surgery    Photo ID and insurance card    Copy of your health care directive, if you have one    Glasses and hearing aides (bring cases)  ? You can't  wear contacts during surgery    Inhaler and eye drops, if you use them (tell us about these when you arrive)    CPAP machine or breathing device, if you use them    A few personal items, if spending the night    If you have . . .  ? A pacemaker, ICD (cardiac defibrillator) or other implant: Bring the ID card.  ? An implanted stimulator: Bring the remote control.  ? A legal guardian: Bring a copy of the certified (court-stamped) guardianship papers.  Please remove any jewelry, including body piercings. Leave jewelry and other valuables at home.  If you're going home the day of surgery    You must have a responsible adult drive you home. They should stay with you overnight as well.    If you don't have someone to stay with you, and you aren't safe to go home alone, we may keep you overnight. Insurance often won't pay for this.  After surgery  If it's hard to control your pain or you need more pain medicine, please call your surgeon's office.  Questions?   If you have any questions for your care team, list them here: _________________________________________________________________________________________________________________________________________________________________________ ____________________________________ ____________________________________ ____________________________________  For informational purposes only. Not to replace the advice of your health care provider. Copyright   2003, 2019 [facility]. All rights reserved. Clinically reviewed by Martha Hanks MD. [employer] [id number] - REV 07/21.        How to Take Your Medication Before Surgery  1. I will consult with the cardiology team about stopping all of anticoagulants and timing  2. SKIP your morning short acting insulin the day of surgery; take only 30 UNITS of your Long acting  3. Ok to take the following oral medications the day of: atorvastatin, carvedilol, gabapntin, levothyroxine, lisinopril and protonix  4. DO NOT take lasix and I  will contact the cardiology team about holding your plavix and eliquis      Instructions About Your Continuous Glucose Monitor  You should be prepared to remove the Continuous Glucose Monitor prior to the operation in order to avoid damage to the equipment during the procedure. Your Continuous Glucose Monitor will not be the source of glucose monitoring during the operation.    How to Manage Your Diabetes Before Surgery  If you use insulin for your diabetes, follow these steps to keep your blood sugar in a safe range before surgery, when you ve been told not to eat or drink:     Check your blood sugar every 4 hours     If your blood sugar is high, take a corrective dose (not a meal dose) of sliding-scale insulin, if that is what you re used to doing    If your blood sugar is below 100, or you have symptoms of hypoglycemia, follow these steps:   1) Have 1 item from this list:  - 4 oz (1/2 cup) of fruit juice without pulp    - 4 oz (1/2 cup) of regular soda (not diet soda)    - 3 glucose gels    - 5 sugar cubes or sugar packets   2) Check your blood sugar again after 15 minutes  3) Repeat steps 1 and 2 again until your blood sugar is greater than 100

## 2022-02-09 NOTE — PROGRESS NOTES
Ordered labs for upcoming CORE appt.    Future Appointments   Date Time Provider Department Center   2/11/2022 10:10 AM Gael Christensen NP RUUM[facility] CL[facility]   2/22/2022 10:45 AM Татьяна Mckeon DPM, Podiatry/Foot and Ankle Surgery [facility]   3/1/2022 10:15 AM Татьяна Mckeon DPM, Podiatry/Foot and Ankle Surgery [facility] - [facility]   3/7/2022 12:30 PM Goltz, Bennett Ezra, PA-C [facility]   3/17/2022 10:45 AM [facility]       Princess Chapa, RN 1:52 PM 02/09/22

## 2022-02-09 NOTE — TELEPHONE ENCOUNTER
Reason for call:  Patient is scheduled for amputation surgery on Monday. He had a pre-op phys and was told not to wear his glucose monitor. He would like confirmation on that.    Cell number on file:    Telephone Information:   Mobile [phone]

## 2022-02-10 NOTE — TELEPHONE ENCOUNTER
REcieved below message:    Dear Dr. Mckeon,     I called this patient for his pre-op call for surgery Monday, 02/14/22.   He mentioned it is getting harder to get up from the BR and would like bars installed in his BR.   Is that something your office can order/refer him to? I was unsure if Home Care would be part of that but thought it involves a Dr's order. He wanted to make sure it was covered by Medicare.   Any input or assistance you can offer Mr. Elder would be appreciated..     Sincerely,   Seble Edwards RN BSN   [facility] Per-op       Unfortunately I do not do or order this.      I recommend a home Occupational Therapy and Physical therapy referral for them to assess this as they are the most likely ones to prescribe them.     Please let patient know and if he would like those referrals, I can place them.     Татьяна Mckeon, DAVIDM

## 2022-02-10 NOTE — TELEPHONE ENCOUNTER
Phone call to patient.    Informed him of 's message below.   Patient states he would indeed like a referral, please place order.  Informed patient physical therapy/ occupational therapy should be contacting patient    Zoila Garcia ATC

## 2022-02-10 NOTE — PROGRESS NOTES
Cardiology Clinic Progress Note    Service Date: February 11, 2022    Primary Cardiology Team: Dr. King      Reason for Visit: 6 month follow up for CAD, ischemic cardiomyopathy    HPI:   I had the pleasure of seeing Mr. Jayro Elder in the clinic today. He is a 70 year old gentleman with a complex past medical history including type 2 diabetes, hypothyroidism, hypertension, CVA, untreated sleep apnea, CKD, paroxsymal atrial fibrillation, coronary artery disease, status post multivessel PCI, ischemic cardiomyopathy, and osteomyelitis, status post multiple lower extremity digit amputations.    He was seen by Dr. King to establish care in May 2021 as the patient previously followed with Dr. Tomlin who is now retired. Jayro noted an occasional random episode of chest pain at that time, but nothing with exertion. He had another toe amputation in May and has been very limited in his walking because of this. Weight at home remained stable at 254 pounds. Creatinine had bumped slightly to 1.3 so the dose of his furosemide was decreased from 80 mg in the morning and 40 mg in the evening to 40 mg twice daily.    Jayro was admitted to  [facility] on 12/28/21 after presenting with chest pain. Troponin checks were within normal limits.  Repeat coronary angiography was completed on 12/28/21 showing moderate disease, but no flow-limiting lesions with no intervention required.  LVEDP was mildly elevated at 19 mmHg. His furosemide and Imdur doses were increased and compliance with his medications and CPAP was encouraged.     The patient was subsequent admitted to the [facility] 1/4/2022 after presenting with concerns for a foot infection. He was found to have left first great toe osteomyelitis and was treated with antibiotics.  He is now scheduled for surgery on 2/14/22 for partial left great toe amputation.     Today, Jayro presents to the clinic in routine follow-up  of his recent hospitalizations. He tells me that he has been doing okay over the past couple of months. He tells me that he continues to get occasional episodes of mild chest discomfort but feels that they have been less frequent recently. The symptoms have not occurred with exertion. He decreased the dose of Lasix from 80 mg daily to 40 mg daily on his own as he felt that he was stuck in the bathroom too frequently.  He has done this on a number of occasions in the past as well. He reports that his weight has been relatively stable in his checks at home around 260 pounds. Weight in the clinic today is 271 pounds, which is stable in comparison to his last clinic visit this past August.  He also notes that he decrease the dose of Imdur on his own from 60 mg back down to 30 mg once daily as he did not feel that it was making any difference in terms of his chest discomfort and he felt more lightheaded, particularly if he change positions too quickly, while he was on the higher dose. He otherwise denies concerns from a cardiac standpoint. He feels that his shortness of breath and lower extremity edema have been stable recently.  He occasionally feels a sensation of palpitations with his chronic atrial fibrillation. He has not had significant dizziness, presyncope, or syncope.  Plavix is currently on hold for his planned surgery next week and he has been instructed to stop Eliquis with his last dose this coming Saturday 2/12/22.    Labs were checked prior to the visit today which I personally reviewed with a basic metabolic panel showing stable electrolytes and renal function with potassium of 4.0, creatinine of 1.10, and GFR estimated at 72.  NT pro BNP is mildly elevated but overall stable in comparison to his previous checks at 1588 today.     ASSESSMENT AND PLAN:  1. Chronic systolic congestive heart failure, ischemic cardiomyopathy  - EF 40-45% on most recent TTE.  - NYHA class II symptoms. Appears relatively well  compensated with signs or symptoms to suggest acute CHF. Weight has been fairly stable around 260 lb on his home scale and 270 lb on the clinic scale.  He has been instructed to take furosemide at 80 mg daily or 40 mg twice daily on several occasions and decreases it on his own to 40 mg daily as he does not like to be stuck going to the bathroom so frequently.  Given that symptoms have been stable and it sounds like he has been on the 40 mg once daily dose for several weeks I will continue this without changes.  - Counseled on continuing to check weights daily and trying to stick to a low sodium diet (under 2,000 mg/day).    2. Coronary artery disease  - Status post multi-vessel stenting to the proximal and mid RCA and OM 3 in the past. Most recent coronary angiogram 12/2021 stable moderate disease with no flow limiting lesion with continued medical management recommended.  - He has continued to have occasional episodes of atypical chest pain but no symptoms with exertion. Symptoms did not improve with a trial of increased dose of Imdur making it less likely that symptoms are cardiac in nature.   - Will continue his current regimen of plavix 75 mg daily, beta blocker as above, atorvastatin 40 mg once daily, and imdur 30 mg once daily. He is okay to hold Plavix as outlined above for his upcoming procedure.    3. Essential hypertension  - Well-controlled. Continue current regimen.    4. Hyperlipidemia  - Treated on atorvastatin 40 mg once daily as above with most recent LDL cholesterol excellently controlled at 33 during his hospital stay in 04/2021.    5. Chronic atrial fibrillation  - [id number] Score significantly elevated at 7 (age, HTN, HF, hx of CVA, CAD). Chronically anticoagulated on apixaban 5 mg BID. Okay to hold apixaban as outlined above for his upcoming procedure.  - Rate controlled on carvedilol 25 mg BID.    6. History of CVA    7. Type 2 diabetes mellitus     8. Chronic kidney disease  - Baseline  creatinine of around 1.1 to 1.2.     Thank you for the opportunity to participate in this patient's care.  He is already scheduled to follow-up with Dr. King in about 1 month so we will keep this appointment as planned.  I encouraged him to call the clinic with concerns in the meantime, and we would be happy to see him sooner if needed.     45 total minutes was spent today including chart review, precharting, history and exam, post visit documentation, and reviewing studies as outlined above.     LORRAINE De Anda, CNP   Nurse Practitioner  [facility]  Pager: [phone]  Text Page  (8am - 5pm, M-F)    Orders this Visit:  No orders of the defined types were placed in this encounter.    Orders Placed This Encounter   Medications     furosemide (LASIX) 40 MG tablet     Sig: Take 1 tablet (40 mg) by mouth daily     Dispense:  90 tablet     Refill:  3     Medications Discontinued During This Encounter   Medication Reason     furosemide (LASIX) 40 MG tablet      Encounter Diagnoses   Name Primary?     Chronic systolic congestive heart failure (H)      Acute on chronic systolic congestive heart failure (H)      CURRENT MEDICATIONS:  Current Outpatient Medications   Medication Sig Dispense Refill     apixaban ANTICOAGULANT (ELIQUIS) 5 MG tablet Take 1 tablet (5 mg) by mouth 2 times daily       atorvastatin (LIPITOR) 40 MG tablet Take 1 tablet (40 mg) by mouth every evening 90 tablet 3     carvedilol (COREG) 25 MG tablet Take 1 tablet (25 mg) by mouth 2 times daily (with meals) 180 tablet 3     Continuous Blood Gluc  (FREESTYLE CLARITA 2 READER SYSTM) DRAGAN 1 Device daily 1 each 3     Continuous Blood Gluc Sensor (FREESTYLE CLARITA 2 SENSOR SYSTM) MISC 1 Units 4 times daily (before meals and nightly) 1 each 3     furosemide (LASIX) 40 MG tablet Take 1 tablet (40 mg) by mouth daily 90 tablet 3     gabapentin (NEURONTIN) 300 MG capsule TAKE 2-3 CAPSULES (600-900 MG) BY MOUTH AT BEDTIME 180 capsule  1     insulin aspart (NOVOLOG PEN) 100 UNIT/ML pen Inject 12 Units Subcutaneous 2 times daily (with meals) With breakfast and lunch (Patient taking differently: Inject 16 Units Subcutaneous 3 times daily (with meals) 16 at Breakfast, 18 at Dinner/Lunch and 16 at Supper.)       insulin glargine (LANTUS PEN) 100 UNIT/ML pen Inject 44 units subcutaneously once every morning       insulin pen needle (31G X 6 MM) 31G X 6 MM miscellaneous Use  as directed. 100 each 11     isosorbide mononitrate (IMDUR) 60 MG 24 hr tablet Take 30 mg by mouth daily        levothyroxine (SYNTHROID, LEVOTHROID) 137 MCG tablet Take 1 tablet by mouth once every evening 90 tablet 3     lisinopril (ZESTRIL) 2.5 MG tablet Take 1 tablet (2.5 mg) by mouth daily 90 tablet 3     nitroGLYcerin (NITROSTAT) 0.4 MG sublingual tablet For chest pain place 1 tablet under the tongue every 5 minutes for 3 doses. If symptoms persist 5 minutes after 1st dose call 911. 15 tablet 3     pantoprazole (PROTONIX) 40 MG EC tablet TAKE 1 TABLET BY MOUTH EVERY MORNING BEFORE BREAKFAST 90 tablet 1     clopidogrel (PLAVIX) 75 MG tablet Take 1 tablet (75 mg) by mouth daily (Patient not taking: Reported on 2022) 90 tablet 3     ALLERGIES  Allergies   Allergen Reactions     No Known Allergies        PAST MEDICAL, SURGICAL, FAMILY HISTORY:  History was reviewed and updated as needed, see medical record.    SOCIAL HISTORY:  Social History     Socioeconomic History     Marital status:      Spouse name: Not on file     Number of children: Not on file     Years of education: Not on file     Highest education level: Not on file   Occupational History     Not on file   Tobacco Use     Smoking status: Former Smoker     Packs/day: 1.00     Years: 25.00     Pack years: 25.00     Types: Cigarettes     Start date:      Quit date: 2005     Years since quittin.5     Smokeless tobacco: Never Used   Vaping Use     Vaping Use: Never used   Substance and Sexual  "Activity     Alcohol use: Yes     Alcohol/week: 4.0 standard drinks     Types: 4 Shots of liquor per week     Comment: 3x/week: Glass of Scotch.     Drug use: No     Sexual activity: Yes     Partners: Female   Other Topics Concern     Parent/sibling w/ CABG, MI or angioplasty before 65F 55M? Yes      Service Not Asked     Blood Transfusions Not Asked     Caffeine Concern No     Comment: 3 cups daily     Occupational Exposure Not Asked     Hobby Hazards Not Asked     Sleep Concern Not Asked     Stress Concern Not Asked     Weight Concern Not Asked     Special Diet Yes     Comment: watching carb intake     Back Care Not Asked     Exercise No     Comment: some walking     Bike Helmet Not Asked     Seat Belt Not Asked     Self-Exams Not Asked   Social History Narrative     Not on file     Social Determinants of Health     Financial Resource Strain: Not on file   Food Insecurity: Not on file   Transportation Needs: Not on file   Physical Activity: Not on file   Stress: Not on file   Social Connections: Not on file   Intimate Partner Violence: Not on file   Housing Stability: Not on file     Review of Systems:  Skin:  Positive for itching   Eyes:  Positive for visual blurring;glasses  ENT:  Positive for tinnitus  Respiratory:  Positive for dyspnea on exertion;sleep apnea  Cardiovascular:    Positive for;palpitations;chest pain;heaviness;dizziness;fatigue;edema  Gastroenterology: Positive for reflux  Genitourinary:  Negative    Musculoskeletal:  Positive for joint pain  Neurologic:  Positive for numbness or tingling of hands;numbness or tingling of feet;headaches  Psychiatric:  Positive for sleep disturbances  Heme/Lymph/Imm:  Positive for easy bruising;night sweats  Endocrine:  Positive for diabetes;thyroid disorder     Physical Exam:  Vitals: /76   Pulse 80   Ht 1.905 m (6' 3\")   Wt 120.7 kg (266 lb 3.2 oz)   SpO2 98%   BMI 33.27 kg/m     Wt Readings from Last 4 Encounters:   02/11/22 120.7 kg (266 lb " 3.2 oz)   02/08/22 117.3 kg (258 lb 11.2 oz)   02/02/22 114.8 kg (253 lb)   01/26/22 108.4 kg (239 lb)     CONSTITUTIONAL: Appears his stated age, well nourished, and in no acute distress.  HEENT: Pupils equal, round. Sclerae nonicteric.    NECK: Supple, no masses or JVD appreciated.   C/V: Irregularly irregular rhythm, controlled rate, normal S1 and S2, no S3 or S4, no murmur, rub or gallop.   RESP: Respirations are unlabored. Lungs are clear to auscultation bilaterally without wheezing, rales, or rhonchi.  GI: Abdomen soft, non-tender, non-distended.  EXTREM: Trace to 1+ LE edema in the ankles bilaterally. No clubbing or cyanosis.  NEURO: Alert and oriented, cooperative. Gait steady. No gross focal deficits.   PSYCH: Affect appropriate, somewhat flat. Mentation normal. Responds to questions appropriately.  SKIN: Warm and dry.    Recent Lab Results:  LIPID RESULTS:  Lab Results   Component Value Date    CHOL 79 04/08/2021    HDL 36 (L) 04/08/2021    LDL 33 04/08/2021    TRIG 52 04/08/2021    CHOLHDLRATIO 2.3 02/02/2015     LIVER ENZYME RESULTS:  Lab Results   Component Value Date    AST 16 01/05/2022    AST 14 04/08/2021    ALT 25 01/05/2022    ALT 30 04/08/2021     CBC RESULTS:  Lab Results   Component Value Date    WBC 3.7 (L) 01/07/2022    WBC 5.9 05/09/2021    RBC 3.44 (L) 01/07/2022    RBC 3.77 (L) 05/09/2021    HGB 10.0 (L) 01/07/2022    HGB 10.7 (L) 05/09/2021    HCT 30.2 (L) 01/07/2022    HCT 33.7 (L) 05/09/2021    MCV 88 01/07/2022    MCV 89 05/09/2021    MCH 29.1 01/07/2022    MCH 28.4 05/09/2021    MCHC 33.1 01/07/2022    MCHC 31.8 05/09/2021    RDW 13.6 01/07/2022    RDW 13.2 05/09/2021     01/07/2022     05/09/2021     BMP RESULTS:  Lab Results   Component Value Date     02/11/2022     05/17/2021    POTASSIUM 4.0 02/11/2022    POTASSIUM 4.1 05/17/2021    CHLORIDE 106 02/11/2022    CHLORIDE 102 05/17/2021    CO2 26 02/11/2022    CO2 28 05/17/2021    ANIONGAP 5 02/11/2022     ANIONGAP 6 05/17/2021     (H) 02/11/2022     (H) 05/17/2021    BUN 17 02/11/2022    BUN 21 05/17/2021    CR 1.10 02/11/2022    CR 1.30 (H) 05/17/2021    GFRESTIMATED 72 02/11/2022    GFRESTIMATED 55 (L) 05/17/2021    GFRESTBLACK 64 05/17/2021    BETTIE 8.5 02/11/2022    BETTIE 8.9 05/17/2021      A1C RESULTS:  Lab Results   Component Value Date    A1C 8.5 (H) 12/28/2021    A1C 8.1 (H) 04/08/2021     CC  Livan King MD  [address] [place] [address]    This note was completed in part using Dragon voice recognition software. Although reviewed after completion, some word and grammatical errors may occur.

## 2022-02-10 NOTE — TELEPHONE ENCOUNTER
Reason for call:  Yuliya from [facility] said she has to decline referral due to capacity.    Other phone number:  [phone] opt 1 for intake

## 2022-02-10 NOTE — H&P (VIEW-ONLY)
Cardiology Clinic Progress Note    Service Date: February 11, 2022    Primary Cardiology Team: Dr. King      Reason for Visit: 6 month follow up for CAD, ischemic cardiomyopathy    HPI:   I had the pleasure of seeing Mr. Jayro Elder in the clinic today. He is a 70 year old gentleman with a complex past medical history including type 2 diabetes, hypothyroidism, hypertension, CVA, untreated sleep apnea, CKD, paroxsymal atrial fibrillation, coronary artery disease, status post multivessel PCI, ischemic cardiomyopathy, and osteomyelitis, status post multiple lower extremity digit amputations.    He was seen by Dr. King to establish care in May 2021 as the patient previously followed with Dr. Tomlin who is now retired. Jayro noted an occasional random episode of chest pain at that time, but nothing with exertion. He had another toe amputation in May and has been very limited in his walking because of this. Weight at home remained stable at 254 pounds. Creatinine had bumped slightly to 1.3 so the dose of his furosemide was decreased from 80 mg in the morning and 40 mg in the evening to 40 mg twice daily.    Jayro was admitted to  [facility] on 12/28/21 after presenting with chest pain. Troponin checks were within normal limits.  Repeat coronary angiography was completed on 12/28/21 showing moderate disease, but no flow-limiting lesions with no intervention required.  LVEDP was mildly elevated at 19 mmHg. His furosemide and Imdur doses were increased and compliance with his medications and CPAP was encouraged.     The patient was subsequent admitted to the [facility] 1/4/2022 after presenting with concerns for a foot infection. He was found to have left first great toe osteomyelitis and was treated with antibiotics.  He is now scheduled for surgery on 2/14/22 for partial left great toe amputation.     Today, Jayro presents to the clinic in routine follow-up  of his recent hospitalizations. He tells me that he has been doing okay over the past couple of months. He tells me that he continues to get occasional episodes of mild chest discomfort but feels that they have been less frequent recently. The symptoms have not occurred with exertion. He decreased the dose of Lasix from 80 mg daily to 40 mg daily on his own as he felt that he was stuck in the bathroom too frequently.  He has done this on a number of occasions in the past as well. He reports that his weight has been relatively stable in his checks at home around 260 pounds. Weight in the clinic today is 271 pounds, which is stable in comparison to his last clinic visit this past August.  He also notes that he decrease the dose of Imdur on his own from 60 mg back down to 30 mg once daily as he did not feel that it was making any difference in terms of his chest discomfort and he felt more lightheaded, particularly if he change positions too quickly, while he was on the higher dose. He otherwise denies concerns from a cardiac standpoint. He feels that his shortness of breath and lower extremity edema have been stable recently.  He occasionally feels a sensation of palpitations with his chronic atrial fibrillation. He has not had significant dizziness, presyncope, or syncope.  Plavix is currently on hold for his planned surgery next week and he has been instructed to stop Eliquis with his last dose this coming Saturday 2/12/22.    Labs were checked prior to the visit today which I personally reviewed with a basic metabolic panel showing stable electrolytes and renal function with potassium of 4.0, creatinine of 1.10, and GFR estimated at 72.  NT pro BNP is mildly elevated but overall stable in comparison to his previous checks at 1588 today.     ASSESSMENT AND PLAN:  1. Chronic systolic congestive heart failure, ischemic cardiomyopathy  - EF 40-45% on most recent TTE.  - NYHA class II symptoms. Appears relatively well  compensated with signs or symptoms to suggest acute CHF. Weight has been fairly stable around 260 lb on his home scale and 270 lb on the clinic scale.  He has been instructed to take furosemide at 80 mg daily or 40 mg twice daily on several occasions and decreases it on his own to 40 mg daily as he does not like to be stuck going to the bathroom so frequently.  Given that symptoms have been stable and it sounds like he has been on the 40 mg once daily dose for several weeks I will continue this without changes.  - Counseled on continuing to check weights daily and trying to stick to a low sodium diet (under 2,000 mg/day).    2. Coronary artery disease  - Status post multi-vessel stenting to the proximal and mid RCA and OM 3 in the past. Most recent coronary angiogram 12/2021 stable moderate disease with no flow limiting lesion with continued medical management recommended.  - He has continued to have occasional episodes of atypical chest pain but no symptoms with exertion. Symptoms did not improve with a trial of increased dose of Imdur making it less likely that symptoms are cardiac in nature.   - Will continue his current regimen of plavix 75 mg daily, beta blocker as above, atorvastatin 40 mg once daily, and imdur 30 mg once daily. He is okay to hold Plavix as outlined above for his upcoming procedure.    3. Essential hypertension  - Well-controlled. Continue current regimen.    4. Hyperlipidemia  - Treated on atorvastatin 40 mg once daily as above with most recent LDL cholesterol excellently controlled at 33 during his hospital stay in 04/2021.    5. Chronic atrial fibrillation  - [id number] Score significantly elevated at 7 (age, HTN, HF, hx of CVA, CAD). Chronically anticoagulated on apixaban 5 mg BID. Okay to hold apixaban as outlined above for his upcoming procedure.  - Rate controlled on carvedilol 25 mg BID.    6. History of CVA    7. Type 2 diabetes mellitus     8. Chronic kidney disease  - Baseline  creatinine of around 1.1 to 1.2.     Thank you for the opportunity to participate in this patient's care.  He is already scheduled to follow-up with Dr. King in about 1 month so we will keep this appointment as planned.  I encouraged him to call the clinic with concerns in the meantime, and we would be happy to see him sooner if needed.     45 total minutes was spent today including chart review, precharting, history and exam, post visit documentation, and reviewing studies as outlined above.     LORRAINE De Anda, CNP   Nurse Practitioner  [facility]  Pager: [phone]  Text Page  (8am - 5pm, M-F)    Orders this Visit:  No orders of the defined types were placed in this encounter.    Orders Placed This Encounter   Medications     furosemide (LASIX) 40 MG tablet     Sig: Take 1 tablet (40 mg) by mouth daily     Dispense:  90 tablet     Refill:  3     Medications Discontinued During This Encounter   Medication Reason     furosemide (LASIX) 40 MG tablet      Encounter Diagnoses   Name Primary?     Chronic systolic congestive heart failure (H)      Acute on chronic systolic congestive heart failure (H)      CURRENT MEDICATIONS:  Current Outpatient Medications   Medication Sig Dispense Refill     apixaban ANTICOAGULANT (ELIQUIS) 5 MG tablet Take 1 tablet (5 mg) by mouth 2 times daily       atorvastatin (LIPITOR) 40 MG tablet Take 1 tablet (40 mg) by mouth every evening 90 tablet 3     carvedilol (COREG) 25 MG tablet Take 1 tablet (25 mg) by mouth 2 times daily (with meals) 180 tablet 3     Continuous Blood Gluc  (FREESTYLE CLARITA 2 READER SYSTM) DRAGAN 1 Device daily 1 each 3     Continuous Blood Gluc Sensor (FREESTYLE CLARITA 2 SENSOR SYSTM) MISC 1 Units 4 times daily (before meals and nightly) 1 each 3     furosemide (LASIX) 40 MG tablet Take 1 tablet (40 mg) by mouth daily 90 tablet 3     gabapentin (NEURONTIN) 300 MG capsule TAKE 2-3 CAPSULES (600-900 MG) BY MOUTH AT BEDTIME 180 capsule  1     insulin aspart (NOVOLOG PEN) 100 UNIT/ML pen Inject 12 Units Subcutaneous 2 times daily (with meals) With breakfast and lunch (Patient taking differently: Inject 16 Units Subcutaneous 3 times daily (with meals) 16 at Breakfast, 18 at Dinner/Lunch and 16 at Supper.)       insulin glargine (LANTUS PEN) 100 UNIT/ML pen Inject 44 units subcutaneously once every morning       insulin pen needle (31G X 6 MM) 31G X 6 MM miscellaneous Use  as directed. 100 each 11     isosorbide mononitrate (IMDUR) 60 MG 24 hr tablet Take 30 mg by mouth daily        levothyroxine (SYNTHROID, LEVOTHROID) 137 MCG tablet Take 1 tablet by mouth once every evening 90 tablet 3     lisinopril (ZESTRIL) 2.5 MG tablet Take 1 tablet (2.5 mg) by mouth daily 90 tablet 3     nitroGLYcerin (NITROSTAT) 0.4 MG sublingual tablet For chest pain place 1 tablet under the tongue every 5 minutes for 3 doses. If symptoms persist 5 minutes after 1st dose call 911. 15 tablet 3     pantoprazole (PROTONIX) 40 MG EC tablet TAKE 1 TABLET BY MOUTH EVERY MORNING BEFORE BREAKFAST 90 tablet 1     clopidogrel (PLAVIX) 75 MG tablet Take 1 tablet (75 mg) by mouth daily (Patient not taking: Reported on 2022) 90 tablet 3     ALLERGIES  Allergies   Allergen Reactions     No Known Allergies        PAST MEDICAL, SURGICAL, FAMILY HISTORY:  History was reviewed and updated as needed, see medical record.    SOCIAL HISTORY:  Social History     Socioeconomic History     Marital status:      Spouse name: Not on file     Number of children: Not on file     Years of education: Not on file     Highest education level: Not on file   Occupational History     Not on file   Tobacco Use     Smoking status: Former Smoker     Packs/day: 1.00     Years: 25.00     Pack years: 25.00     Types: Cigarettes     Start date:      Quit date: 2005     Years since quittin.5     Smokeless tobacco: Never Used   Vaping Use     Vaping Use: Never used   Substance and Sexual  "Activity     Alcohol use: Yes     Alcohol/week: 4.0 standard drinks     Types: 4 Shots of liquor per week     Comment: 3x/week: Glass of Scotch.     Drug use: No     Sexual activity: Yes     Partners: Female   Other Topics Concern     Parent/sibling w/ CABG, MI or angioplasty before 65F 55M? Yes      Service Not Asked     Blood Transfusions Not Asked     Caffeine Concern No     Comment: 3 cups daily     Occupational Exposure Not Asked     Hobby Hazards Not Asked     Sleep Concern Not Asked     Stress Concern Not Asked     Weight Concern Not Asked     Special Diet Yes     Comment: watching carb intake     Back Care Not Asked     Exercise No     Comment: some walking     Bike Helmet Not Asked     Seat Belt Not Asked     Self-Exams Not Asked   Social History Narrative     Not on file     Social Determinants of Health     Financial Resource Strain: Not on file   Food Insecurity: Not on file   Transportation Needs: Not on file   Physical Activity: Not on file   Stress: Not on file   Social Connections: Not on file   Intimate Partner Violence: Not on file   Housing Stability: Not on file     Review of Systems:  Skin:  Positive for itching   Eyes:  Positive for visual blurring;glasses  ENT:  Positive for tinnitus  Respiratory:  Positive for dyspnea on exertion;sleep apnea  Cardiovascular:    Positive for;palpitations;chest pain;heaviness;dizziness;fatigue;edema  Gastroenterology: Positive for reflux  Genitourinary:  Negative    Musculoskeletal:  Positive for joint pain  Neurologic:  Positive for numbness or tingling of hands;numbness or tingling of feet;headaches  Psychiatric:  Positive for sleep disturbances  Heme/Lymph/Imm:  Positive for easy bruising;night sweats  Endocrine:  Positive for diabetes;thyroid disorder     Physical Exam:  Vitals: /76   Pulse 80   Ht 1.905 m (6' 3\")   Wt 120.7 kg (266 lb 3.2 oz)   SpO2 98%   BMI 33.27 kg/m     Wt Readings from Last 4 Encounters:   02/11/22 120.7 kg (266 lb " 3.2 oz)   02/08/22 117.3 kg (258 lb 11.2 oz)   02/02/22 114.8 kg (253 lb)   01/26/22 108.4 kg (239 lb)     CONSTITUTIONAL: Appears his stated age, well nourished, and in no acute distress.  HEENT: Pupils equal, round. Sclerae nonicteric.    NECK: Supple, no masses or JVD appreciated.   C/V: Irregularly irregular rhythm, controlled rate, normal S1 and S2, no S3 or S4, no murmur, rub or gallop.   RESP: Respirations are unlabored. Lungs are clear to auscultation bilaterally without wheezing, rales, or rhonchi.  GI: Abdomen soft, non-tender, non-distended.  EXTREM: Trace to 1+ LE edema in the ankles bilaterally. No clubbing or cyanosis.  NEURO: Alert and oriented, cooperative. Gait steady. No gross focal deficits.   PSYCH: Affect appropriate, somewhat flat. Mentation normal. Responds to questions appropriately.  SKIN: Warm and dry.    Recent Lab Results:  LIPID RESULTS:  Lab Results   Component Value Date    CHOL 79 04/08/2021    HDL 36 (L) 04/08/2021    LDL 33 04/08/2021    TRIG 52 04/08/2021    CHOLHDLRATIO 2.3 02/02/2015     LIVER ENZYME RESULTS:  Lab Results   Component Value Date    AST 16 01/05/2022    AST 14 04/08/2021    ALT 25 01/05/2022    ALT 30 04/08/2021     CBC RESULTS:  Lab Results   Component Value Date    WBC 3.7 (L) 01/07/2022    WBC 5.9 05/09/2021    RBC 3.44 (L) 01/07/2022    RBC 3.77 (L) 05/09/2021    HGB 10.0 (L) 01/07/2022    HGB 10.7 (L) 05/09/2021    HCT 30.2 (L) 01/07/2022    HCT 33.7 (L) 05/09/2021    MCV 88 01/07/2022    MCV 89 05/09/2021    MCH 29.1 01/07/2022    MCH 28.4 05/09/2021    MCHC 33.1 01/07/2022    MCHC 31.8 05/09/2021    RDW 13.6 01/07/2022    RDW 13.2 05/09/2021     01/07/2022     05/09/2021     BMP RESULTS:  Lab Results   Component Value Date     02/11/2022     05/17/2021    POTASSIUM 4.0 02/11/2022    POTASSIUM 4.1 05/17/2021    CHLORIDE 106 02/11/2022    CHLORIDE 102 05/17/2021    CO2 26 02/11/2022    CO2 28 05/17/2021    ANIONGAP 5 02/11/2022     ANIONGAP 6 05/17/2021     (H) 02/11/2022     (H) 05/17/2021    BUN 17 02/11/2022    BUN 21 05/17/2021    CR 1.10 02/11/2022    CR 1.30 (H) 05/17/2021    GFRESTIMATED 72 02/11/2022    GFRESTIMATED 55 (L) 05/17/2021    GFRESTBLACK 64 05/17/2021    BETTIE 8.5 02/11/2022    BETTIE 8.9 05/17/2021      A1C RESULTS:  Lab Results   Component Value Date    A1C 8.5 (H) 12/28/2021    A1C 8.1 (H) 04/08/2021     CC  Livan King MD  [id number] [place] [id number]    This note was completed in part using Dragon voice recognition software. Although reviewed after completion, some word and grammatical errors may occur.

## 2022-02-11 NOTE — TELEPHONE ENCOUNTER
Phone call to patient and instructed him to follow PCP recommendations regarding his glucose monitor.     He was also informed that the Home Care referral was declined by [facility] due to capacity issues. Suggested he check with his insurance to see what other home care agencies are in network for him. He can discuss it further with the  at the hospital upon discharge. He verbalized understanding.     ROLANDA Bates RN

## 2022-02-11 NOTE — LETTER
2/11/2022    Davion Cordova PA-C  [id number]  [facility] [address]    RE: Jayro Elder       Dear Colleague,     I had the pleasure of seeing Jayro Elder in the [facility].    Cardiology Clinic Progress Note    Service Date: February 11, 2022    Primary Cardiology Team: Dr. King      Reason for Visit: 6 month follow up for CAD, ischemic cardiomyopathy    HPI:   I had the pleasure of seeing Mr. Jayro Elder in the clinic today. He is a 70 year old gentleman with a complex past medical history including type 2 diabetes, hypothyroidism, hypertension, CVA, untreated sleep apnea, CKD, paroxsymal atrial fibrillation, coronary artery disease, status post multivessel PCI, ischemic cardiomyopathy, and osteomyelitis, status post multiple lower extremity digit amputations.    He was seen by Dr. King to establish care in May 2021 as the patient previously followed with Dr. Tomlin who is now retired. Jayro noted an occasional random episode of chest pain at that time, but nothing with exertion. He had another toe amputation in May and has been very limited in his walking because of this. Weight at home remained stable at 254 pounds. Creatinine had bumped slightly to 1.3 so the dose of his furosemide was decreased from 80 mg in the morning and 40 mg in the evening to 40 mg twice daily.    Jayro was admitted to  [facility] on 12/28/21 after presenting with chest pain. Troponin checks were within normal limits.  Repeat coronary angiography was completed on 12/28/21 showing moderate disease, but no flow-limiting lesions with no intervention required.  LVEDP was mildly elevated at 19 mmHg. His furosemide and Imdur doses were increased and compliance with his medications and CPAP was encouraged.     The patient was subsequent admitted to the [facility] 1/4/2022 after presenting with concerns for a foot infection. He was found to  have left first great toe osteomyelitis and was treated with antibiotics.  He is now scheduled for surgery on 2/14/22 for partial left great toe amputation.     Today, Jayro presents to the clinic in routine follow-up of his recent hospitalizations. He tells me that he has been doing okay over the past couple of months. He tells me that he continues to get occasional episodes of mild chest discomfort but feels that they have been less frequent recently. The symptoms have not occurred with exertion. He decreased the dose of Lasix from 80 mg daily to 40 mg daily on his own as he felt that he was stuck in the bathroom too frequently.  He has done this on a number of occasions in the past as well. He reports that his weight has been relatively stable in his checks at home around 260 pounds. Weight in the clinic today is 271 pounds, which is stable in comparison to his last clinic visit this past August.  He also notes that he decrease the dose of Imdur on his own from 60 mg back down to 30 mg once daily as he did not feel that it was making any difference in terms of his chest discomfort and he felt more lightheaded, particularly if he change positions too quickly, while he was on the higher dose. He otherwise denies concerns from a cardiac standpoint. He feels that his shortness of breath and lower extremity edema have been stable recently.  He occasionally feels a sensation of palpitations with his chronic atrial fibrillation. He has not had significant dizziness, presyncope, or syncope.  Plavix is currently on hold for his planned surgery next week and he has been instructed to stop Eliquis with his last dose this coming Saturday 2/12/22.    Labs were checked prior to the visit today which I personally reviewed with a basic metabolic panel showing stable electrolytes and renal function with potassium of 4.0, creatinine of 1.10, and GFR estimated at 72.  NT pro BNP is mildly elevated but overall stable in comparison  to his previous checks at 1588 today.     ASSESSMENT AND PLAN:  1. Chronic systolic congestive heart failure, ischemic cardiomyopathy  - EF 40-45% on most recent TTE.  - NYHA class II symptoms. Appears relatively well compensated with signs or symptoms to suggest acute CHF. Weight has been fairly stable around 260 lb on his home scale and 270 lb on the clinic scale.  He has been instructed to take furosemide at 80 mg daily or 40 mg twice daily on several occasions and decreases it on his own to 40 mg daily as he does not like to be stuck going to the bathroom so frequently.  Given that symptoms have been stable and it sounds like he has been on the 40 mg once daily dose for several weeks I will continue this without changes.  - Counseled on continuing to check weights daily and trying to stick to a low sodium diet (under 2,000 mg/day).    2. Coronary artery disease  - Status post multi-vessel stenting to the proximal and mid RCA and OM 3 in the past. Most recent coronary angiogram 12/2021 stable moderate disease with no flow limiting lesion with continued medical management recommended.  - He has continued to have occasional episodes of atypical chest pain but no symptoms with exertion. Symptoms did not improve with a trial of increased dose of Imdur making it less likely that symptoms are cardiac in nature.   - Will continue his current regimen of plavix 75 mg daily, beta blocker as above, atorvastatin 40 mg once daily, and imdur 30 mg once daily. He is okay to hold Plavix as outlined above for his upcoming procedure.    3. Essential hypertension  - Well-controlled. Continue current regimen.    4. Hyperlipidemia  - Treated on atorvastatin 40 mg once daily as above with most recent LDL cholesterol excellently controlled at 33 during his hospital stay in 04/2021.    5. Chronic atrial fibrillation  - [id number] Score significantly elevated at 7 (age, HTN, HF, hx of CVA, CAD). Chronically anticoagulated on apixaban 5  mg BID. Okay to hold apixaban as outlined above for his upcoming procedure.  - Rate controlled on carvedilol 25 mg BID.    6. History of CVA    7. Type 2 diabetes mellitus     8. Chronic kidney disease  - Baseline creatinine of around 1.1 to 1.2.     Thank you for the opportunity to participate in this patient's care.  He is already scheduled to follow-up with Dr. King in about 1 month so we will keep this appointment as planned.  I encouraged him to call the clinic with concerns in the meantime, and we would be happy to see him sooner if needed.     45 total minutes was spent today including chart review, precharting, history and exam, post visit documentation, and reviewing studies as outlined above.     LORRAINE De Anda, CNP   Nurse Practitioner  [facility]  Pager: [phone]  Text Page  (8am - 5pm, M-F)    Orders this Visit:  No orders of the defined types were placed in this encounter.    Orders Placed This Encounter   Medications     furosemide (LASIX) 40 MG tablet     Sig: Take 1 tablet (40 mg) by mouth daily     Dispense:  90 tablet     Refill:  3     Medications Discontinued During This Encounter   Medication Reason     furosemide (LASIX) 40 MG tablet      Encounter Diagnoses   Name Primary?     Chronic systolic congestive heart failure (H)      Acute on chronic systolic congestive heart failure (H)      CURRENT MEDICATIONS:  Current Outpatient Medications   Medication Sig Dispense Refill     apixaban ANTICOAGULANT (ELIQUIS) 5 MG tablet Take 1 tablet (5 mg) by mouth 2 times daily       atorvastatin (LIPITOR) 40 MG tablet Take 1 tablet (40 mg) by mouth every evening 90 tablet 3     carvedilol (COREG) 25 MG tablet Take 1 tablet (25 mg) by mouth 2 times daily (with meals) 180 tablet 3     Continuous Blood Gluc  (FREESTYLE CLARITA 2 READER SYSTM) DRAGAN 1 Device daily 1 each 3     Continuous Blood Gluc Sensor (FREESTYLE CLARITA 2 SENSOR SYSTM) MISC 1 Units 4 times daily (before  meals and nightly) 1 each 3     furosemide (LASIX) 40 MG tablet Take 1 tablet (40 mg) by mouth daily 90 tablet 3     gabapentin (NEURONTIN) 300 MG capsule TAKE 2-3 CAPSULES (600-900 MG) BY MOUTH AT BEDTIME 180 capsule 1     insulin aspart (NOVOLOG PEN) 100 UNIT/ML pen Inject 12 Units Subcutaneous 2 times daily (with meals) With breakfast and lunch (Patient taking differently: Inject 16 Units Subcutaneous 3 times daily (with meals) 16 at Breakfast, 18 at Dinner/Lunch and 16 at Supper.)       insulin glargine (LANTUS PEN) 100 UNIT/ML pen Inject 44 units subcutaneously once every morning       insulin pen needle (31G X 6 MM) 31G X 6 MM miscellaneous Use  as directed. 100 each 11     isosorbide mononitrate (IMDUR) 60 MG 24 hr tablet Take 30 mg by mouth daily        levothyroxine (SYNTHROID, LEVOTHROID) 137 MCG tablet Take 1 tablet by mouth once every evening 90 tablet 3     lisinopril (ZESTRIL) 2.5 MG tablet Take 1 tablet (2.5 mg) by mouth daily 90 tablet 3     nitroGLYcerin (NITROSTAT) 0.4 MG sublingual tablet For chest pain place 1 tablet under the tongue every 5 minutes for 3 doses. If symptoms persist 5 minutes after 1st dose call 911. 15 tablet 3     pantoprazole (PROTONIX) 40 MG EC tablet TAKE 1 TABLET BY MOUTH EVERY MORNING BEFORE BREAKFAST 90 tablet 1     clopidogrel (PLAVIX) 75 MG tablet Take 1 tablet (75 mg) by mouth daily (Patient not taking: Reported on 2/11/2022) 90 tablet 3     ALLERGIES  Allergies   Allergen Reactions     No Known Allergies        PAST MEDICAL, SURGICAL, FAMILY HISTORY:  History was reviewed and updated as needed, see medical record.    SOCIAL HISTORY:  Social History     Socioeconomic History     Marital status:      Spouse name: Not on file     Number of children: Not on file     Years of education: Not on file     Highest education level: Not on file   Occupational History     Not on file   Tobacco Use     Smoking status: Former Smoker     Packs/day: 1.00     Years: 25.00      Pack years: 25.00     Types: Cigarettes     Start date:      Quit date: 2005     Years since quittin.5     Smokeless tobacco: Never Used   Vaping Use     Vaping Use: Never used   Substance and Sexual Activity     Alcohol use: Yes     Alcohol/week: 4.0 standard drinks     Types: 4 Shots of liquor per week     Comment: 3x/week: Glass of Scotch.     Drug use: No     Sexual activity: Yes     Partners: Female   Other Topics Concern     Parent/sibling w/ CABG, MI or angioplasty before 65F 55M? Yes      Service Not Asked     Blood Transfusions Not Asked     Caffeine Concern No     Comment: 3 cups daily     Occupational Exposure Not Asked     Hobby Hazards Not Asked     Sleep Concern Not Asked     Stress Concern Not Asked     Weight Concern Not Asked     Special Diet Yes     Comment: watching carb intake     Back Care Not Asked     Exercise No     Comment: some walking     Bike Helmet Not Asked     Seat Belt Not Asked     Self-Exams Not Asked   Social History Narrative     Not on file     Social Determinants of Health     Financial Resource Strain: Not on file   Food Insecurity: Not on file   Transportation Needs: Not on file   Physical Activity: Not on file   Stress: Not on file   Social Connections: Not on file   Intimate Partner Violence: Not on file   Housing Stability: Not on file     Review of Systems:  Skin:  Positive for itching   Eyes:  Positive for visual blurring;glasses  ENT:  Positive for tinnitus  Respiratory:  Positive for dyspnea on exertion;sleep apnea  Cardiovascular:    Positive for;palpitations;chest pain;heaviness;dizziness;fatigue;edema  Gastroenterology: Positive for reflux  Genitourinary:  Negative    Musculoskeletal:  Positive for joint pain  Neurologic:  Positive for numbness or tingling of hands;numbness or tingling of feet;headaches  Psychiatric:  Positive for sleep disturbances  Heme/Lymph/Imm:  Positive for easy bruising;night sweats  Endocrine:  Positive for  "diabetes;thyroid disorder     Physical Exam:  Vitals: /76   Pulse 80   Ht 1.905 m (6' 3\")   Wt 120.7 kg (266 lb 3.2 oz)   SpO2 98%   BMI 33.27 kg/m     Wt Readings from Last 4 Encounters:   02/11/22 120.7 kg (266 lb 3.2 oz)   02/08/22 117.3 kg (258 lb 11.2 oz)   02/02/22 114.8 kg (253 lb)   01/26/22 108.4 kg (239 lb)     CONSTITUTIONAL: Appears his stated age, well nourished, and in no acute distress.  HEENT: Pupils equal, round. Sclerae nonicteric.    NECK: Supple, no masses or JVD appreciated.   C/V: Irregularly irregular rhythm, controlled rate, normal S1 and S2, no S3 or S4, no murmur, rub or gallop.   RESP: Respirations are unlabored. Lungs are clear to auscultation bilaterally without wheezing, rales, or rhonchi.  GI: Abdomen soft, non-tender, non-distended.  EXTREM: Trace to 1+ LE edema in the ankles bilaterally. No clubbing or cyanosis.  NEURO: Alert and oriented, cooperative. Gait steady. No gross focal deficits.   PSYCH: Affect appropriate, somewhat flat. Mentation normal. Responds to questions appropriately.  SKIN: Warm and dry.    Recent Lab Results:  LIPID RESULTS:  Lab Results   Component Value Date    CHOL 79 04/08/2021    HDL 36 (L) 04/08/2021    LDL 33 04/08/2021    TRIG 52 04/08/2021    CHOLHDLRATIO 2.3 02/02/2015     LIVER ENZYME RESULTS:  Lab Results   Component Value Date    AST 16 01/05/2022    AST 14 04/08/2021    ALT 25 01/05/2022    ALT 30 04/08/2021     CBC RESULTS:  Lab Results   Component Value Date    WBC 3.7 (L) 01/07/2022    WBC 5.9 05/09/2021    RBC 3.44 (L) 01/07/2022    RBC 3.77 (L) 05/09/2021    HGB 10.0 (L) 01/07/2022    HGB 10.7 (L) 05/09/2021    HCT 30.2 (L) 01/07/2022    HCT 33.7 (L) 05/09/2021    MCV 88 01/07/2022    MCV 89 05/09/2021    MCH 29.1 01/07/2022    MCH 28.4 05/09/2021    MCHC 33.1 01/07/2022    MCHC 31.8 05/09/2021    RDW 13.6 01/07/2022    RDW 13.2 05/09/2021     01/07/2022     05/09/2021     BMP RESULTS:  Lab Results   Component Value " Date     02/11/2022     05/17/2021    POTASSIUM 4.0 02/11/2022    POTASSIUM 4.1 05/17/2021    CHLORIDE 106 02/11/2022    CHLORIDE 102 05/17/2021    CO2 26 02/11/2022    CO2 28 05/17/2021    ANIONGAP 5 02/11/2022    ANIONGAP 6 05/17/2021     (H) 02/11/2022     (H) 05/17/2021    BUN 17 02/11/2022    BUN 21 05/17/2021    CR 1.10 02/11/2022    CR 1.30 (H) 05/17/2021    GFRESTIMATED 72 02/11/2022    GFRESTIMATED 55 (L) 05/17/2021    GFRESTBLACK 64 05/17/2021    BETTIE 8.5 02/11/2022    BETTIE 8.9 05/17/2021      A1C RESULTS:  Lab Results   Component Value Date    A1C 8.5 (H) 12/28/2021    A1C 8.1 (H) 04/08/2021       This note was completed in part using Dragon voice recognition software. Although reviewed after completion, some word and grammatical errors may occur.       Gael Christensen NP   [facility]

## 2022-02-11 NOTE — TELEPHONE ENCOUNTER
If that is what they told him at his pre op history and physical then yes, do not wear his glucose monitor Monday the day of surgery.     PLease let patient know.     Татьяна Mckeon DPM

## 2022-02-14 NOTE — DISCHARGE INSTRUCTIONS
[facility] ADULT DISCHARGE INSTRUCTIONS   SPECIAL PRECAUTIONS FOR 24 HOURS AFTER SURGERY    IT IS NOT UNUSUAL TO FEEL LIGHT-HEADED OR FAINT, UP TO 24 HOURS AFTER SURGERY OR WHILE TAKING PAIN MEDICATION.  IF YOU HAVE THESE SYMPTOMS; SIT FOR A FEW MINUTES BEFORE STANDING AND HAVE SOMEONE ASSIST YOU WHEN YOU GET UP TO WALK OR USE THE BATHROOM.    YOU SHOULD REST AND RELAX FOR THE NEXT 24 HOURS AND YOU MUST MAKE ARRANGEMENTS TO HAVE SOMEONE STAY WITH YOU FOR AT LEAST 24 HOURS AFTER YOUR DISCHARGE.  AVOID HAZARDOUS AND STRENUOUS ACTIVITIES.  DO NOT MAKE IMPORTANT DECISIONS FOR 24 HOURS.    DO NOT DRIVE ANY VEHICLE OR OPERATE MECHANICAL EQUIPMENT FOR 24 HOURS FOLLOWING THE END OF YOUR SURGERY.  EVEN THOUGH YOU MAY FEEL NORMAL, YOUR REACTIONS MAY BE AFFECTED BY THE MEDICATION YOU HAVE RECEIVED.    DO NOT DRINK ALCOHOLIC BEVERAGES FOR 24 HOURS FOLLOWING YOUR SURGERY.    DRINK CLEAR LIQUIDS (APPLE JUICE, GINGER ALE, 7-UP, BROTH, ETC.).  PROGRESS TO YOUR REGULAR DIET AS YOU FEEL ABLE.    YOU MAY HAVE A DRY MOUTH, A SORE THROAT, MUSCLES ACHES OR TROUBLE SLEEPING.  THESE SHOULD GO AWAY AFTER 24 HOURS.    CALL YOUR DOCTOR FOR ANY OF THE FOLLOWING:  SIGNS OF INFECTION (FEVER, GROWING TENDERNESS AT THE SURGERY SITE, A LARGE AMOUNT OF DRAINAGE OR BLEEDING, SEVERE PAIN, FOUL-SMELLING DRAINAGE, REDNESS OR SWELLING.    IT HAS BEEN OVER 8 TO 10 HOURS SINCE SURGERY AND YOU ARE STILL NOT ABLE TO URINATE (PASS WATER).             DR. ROMARIO ASENCIO DPM    Podiatry Triage Phone Number:  [phone]    Patient Care Nurses In Call (After Hours):  [phone]

## 2022-02-14 NOTE — ANESTHESIA POSTPROCEDURE EVALUATION
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    Anesthesia Type:  MAC    Note:  Disposition: Outpatient   Postop Pain Control: Uneventful            Sign Out: Well controlled pain   PONV: No   Neuro/Psych: Uneventful            Sign Out: Acceptable/Baseline neuro status   Airway/Respiratory: Uneventful            Sign Out: Acceptable/Baseline resp. status   CV/Hemodynamics: Uneventful            Sign Out: Acceptable CV status; No obvious hypovolemia; No obvious fluid overload   Other NRE: NONE   DID A NON-ROUTINE EVENT OCCUR? No           Last vitals:  Vitals Value Taken Time   /84 02/14/22 1505   Temp 97.9  F (36.6  C) 02/14/22 1436   Pulse 72 02/14/22 1505   Resp 18 02/14/22 1505   SpO2 99 % 02/14/22 1505       Electronically Signed By: Sabino Hanna MD  February 14, 2022  5:25 PM

## 2022-02-14 NOTE — ANESTHESIA CARE TRANSFER NOTE
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    Anesthesia Type:   MAC     Note:    Oropharynx: oral airway in place  Level of Consciousness: awake and drowsy  Oxygen Supplementation: room air    Independent Airway: airway patency satisfactory and stable  Dentition: dentition unchanged  Vital Signs Stable: post-procedure vital signs reviewed and stable  Report to RN Given: handoff report given  Patient transferred to: Phase II    Handoff Report: Identifed the Patient, Identified the Reponsible Provider, Reviewed the pertinent medical history, Discussed the surgical course, Reviewed Intra-OP anesthesia mangement and issues during anesthesia, Set expectations for post-procedure period and Allowed opportunity for questions and acknowledgement of understanding      Vitals:  Vitals Value Taken Time   BP     Temp     Pulse     Resp     SpO2         Electronically Signed By: LORRAINE Alfaro CRNA  February 14, 2022  1:50 PM

## 2022-02-14 NOTE — ANESTHESIA PREPROCEDURE EVALUATION
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 Only residule: vision impaired.     Cerebral infarction (H)     eye sight decreased in peripheral of right side and blurry     Cerebral infarction (H)      Congestive heart failure (H)      Diabetic ulcer of left midfoot associated with type 2 diabetes mellitus, with fat layer exposed (H) 3/13/2018     Essential hypertension 2002     Essential hypertension, benign 2002     Gastroesophageal reflux disease      Generalized osteoarthrosis, unspecified site 2002     Lightheadedness 2021     Microalbuminuria 3/13/2018    X1     Myocardial infarction (H)      Myocardial infarction (H) 2005     Neuropathy      Neuropathy 2016     Sepsis (H)      Sleep apnea     doesn't use it     Sleep apnea 2006     Stented coronary artery     x 10 stents     Substance abuse (H)      Substance abuse (H)      Syncope, unspecified syncope type 3/13/2018     Syncope, unspecified syncope type 2018     Thyroid disease     takes medicine     Thyroid disease      Tobacco use disorder 2002     Type 2 diabetes mellitus (H)      Type 2 diabetes mellitus with diabetic peripheral angiopathy without gangrene, unspecified long term  insulin use status 2005      Past Surgical History:   Procedure Laterality Date     AMPUTATE TOE(S) Right 1/6/2019    Procedure: Right open second toe partial amputation;  Surgeon: Sabino Garcia DPM;  Location: [facility] OR     AMPUTATE TOE(S) Right 1/8/2019    Procedure: 1.  Revision right second toe amputation with resection of distal half of proximal phalanx with full closure.    2.  Debridement of callus/preulcerative lesion, distal left second toe and plantar left first metatarsophalangeal joint.;  Surgeon: Ryan Bhagat DPM;  Location: [facility]     AMPUTATE TOE(S) Right 3/12/2019    Procedure: Partial right fourth toe amputation.;  Surgeon: Ryan Bhagat DPM;  Location: [facility]     AMPUTATE TOE(S) Right 5/6/2019    Procedure: Right partial third toe amputation;  Surgeon: Татьяна Mckeon DPM, Podiatry/Foot and Ankle Surgery;  Location: [facility] OR     AMPUTATE TOE(S) Left 4/18/2020    Procedure: LEFT SECOND TOE AMPUTATION;  Surgeon: Ryan Bhagat DPM;  Location: [facility]     AMPUTATE TOE(S) Left 5/8/2021    Procedure: 1.  Amputation of the left third toe at the level of the proximal interphalangeal joint. 2.  Excisional debridement of less than 20 square cm down to the level of the deeper skin, plantar left foot.;  Surgeon: Kwasi Root DPM;  Location: [facility] OR     AMPUTATE TOE(S) Right 2019    4 toes amputation      ANGIOGRAM  6/29/05    subtotal occ.mid LAD,SUSAN mid LAD     ANGIOGRAM  7/05    mild CAD,patent stent,no flow-limiting lesions,sev.LV dysf.LAD enlarged     ANGIOGRAM  2/09    Sev.single vessel disease,Mod LV dysf.distal LAD 90%,70-75% mid lad just before prev stent,SUSAN to prox.mid LAD, endeavor to distal LAD     ANGIOGRAM  11/13/13    restenosis, stent LAD     BACK SURGERY      back surgery x3     CARDIAC CATHETERIZATION  07/08/2019     CARDIAC CATHETERIZATION Left 06/13/2017     CARDIAC CATHETERIZATION  2005,2009,20013     CORONARY STENT PLACEMENT  07/08/2019     CV CORONARY ANGIOGRAM N/A  7/8/2019    Procedure: Coronary Angiogram;  Surgeon: Jose Alfredo Crockett MD;  Location:  [facility]     CV CORONARY ANGIOGRAM N/A 4/9/2021    Procedure: CV CORONARY ANGIOGRAM;  Surgeon: Warren Paulson MD;  Location:  [facility]     CV HEART CATHETERIZATION WITH POSSIBLE INTERVENTION N/A 12/28/2021    Procedure: Coronary Angiogram;  Surgeon: Jose Alfredo Crockett MD;  Location:  [facility]     CV INSTANTANEOUS WAVE-FREE RATIO N/A 12/28/2021    Procedure: Instantaneous Wave-Free Ratio;  Surgeon: Jose Alfredo Crockett MD;  Location:  [facility]     CV LEFT HEART CATH N/A 7/8/2019    Procedure: Left Heart Cath;  Surgeon: Jose Alfredo Crockett MD;  Location:  [facility]ARD[facility] CATH LAB     CV LEFT HEART CATH N/A 12/28/2021    Procedure: Left Heart Cath;  Surgeon: Jose Alfredo Crockett MD;  Location:  [facility]ARD[facility] CATH LAB     CV LEFT VENTRICULOGRAM N/A 12/28/2021    Procedure: Left Ventriculogram;  Surgeon: Jose Alfredo Crockett MD;  Location:  [facility]ARD[facility] CATH LAB     CV PCI ASPIRATION THROMECTOMY N/A 7/8/2019    Procedure: PCI Aspiration Thrombectomy;  Surgeon: Jose Alfredo Crockett MD;  Location:  [facility] CARD[facility] CATH LAB     CV PCI STENT DRUG ELUTING N/A 7/8/2019    Procedure: PCI Stent Drug Eluting;  Surgeon: Jose Alfredo Crockett MD;  Location:  [facility] CARD[facility] CATH LAB     HEART CATH LEFT HEART CATH  06/13/2017    SUSAN to OM3     INCISION AND DRAINAGE TOE, COMBINED Bilateral 9/11/2018    Procedure: COMBINED INCISION AND DRAINAGE TOE;  1) Irrigation and debridement ulcer left great toe  2) Amputation 3rd toe right foot at distal interphalangeal joint level;  Surgeon: Miki Harp MD;  Location:  OR     IRRIGATION AND DEBRIDEMENT FOOT, COMBINED Left 3/12/2019    Procedure: Debridement of left foot ulcer sub first MPJ 2 x 2.5 cm down to and including subcutaneous tissue, callus debridement for preulcerative lesion, left second toe.;  Surgeon: Olayinka  BRICE Davis;  Location: [facility] OR     ORTHOPEDIC SURGERY      bunion surgery both feet     ORTHOPEDIC SURGERY      left shoulder     OTHER SURGICAL HISTORY  9 years old    cancer tumor mouth     SHOULDER ARTHROSCOPY W/ ROTATOR CUFF REPAIR Left 2004     SOFT TISSUE SURGERY      debridement of toe numerous time     VASCULAR SURGERY      7 stents in heart     VASCULAR SURGERY       [facility] NONSPECIFIC PROCEDURE      Laminectomy x 3 - (1983 x 2 & )     [facility] NONSPECIFIC PROCEDURE Bilateral 1998    Bunionectomy     [facility] NONSPECIFIC PROCEDURE  1959    Gingival surgery at age 9      Allergies   Allergen Reactions     No Known Allergies       Social History     Tobacco Use     Smoking status: Former Smoker     Packs/day: 1.00     Years: 25.00     Pack years: 25.00     Types: Cigarettes     Start date:      Quit date: 2005     Years since quittin.5     Smokeless tobacco: Never Used   Substance Use Topics     Alcohol use: Yes     Alcohol/week: 4.0 standard drinks     Types: 4 Shots of liquor per week     Comment: 3x/week: Glass of Scotch.      Wt Readings from Last 1 Encounters:   22 120.4 kg (265 lb 8 oz)        Anesthesia Evaluation            ROS/MED HX  ENT/Pulmonary:     (+) sleep apnea, uses CPAP,     Neurologic:     (+) TIA,     Cardiovascular:     (+) --CAD --stent-2017. 10 CHF     METS/Exercise Tolerance:     Hematologic:  - neg hematologic  ROS     Musculoskeletal:   (+) arthritis,     GI/Hepatic:     (+) GERD, Asymptomatic on medication,     Renal/Genitourinary:     (+) renal disease, type: CRI,     Endo:     (+) type II DM, thyroid problem,     Psychiatric/Substance Use:       Infectious Disease:  - neg infectious disease ROS     Malignancy:       Other:            Physical Exam    Airway        Mallampati: III   TM distance: > 3 FB   Neck ROM: full   Mouth opening: > 3 cm    Respiratory Devices and Support         Dental       (+) missing      Cardiovascular          Rhythm and rate: irregular and  normal     Pulmonary   pulmonary exam normal                OUTSIDE LABS:  CBC:   Lab Results   Component Value Date    WBC 3.7 (L) 01/07/2022    WBC 4.8 01/06/2022    HGB 10.0 (L) 01/07/2022    HGB 10.0 (L) 01/06/2022    HCT 30.2 (L) 01/07/2022    HCT 30.7 (L) 01/06/2022     01/07/2022     01/06/2022     BMP:   Lab Results   Component Value Date     02/11/2022     01/07/2022    POTASSIUM 4.0 02/11/2022    POTASSIUM 3.9 01/07/2022    CHLORIDE 106 02/11/2022    CHLORIDE 103 01/07/2022    CO2 26 02/11/2022    CO2 26 01/07/2022    BUN 17 02/11/2022    BUN 18 01/07/2022    CR 1.10 02/11/2022    CR 1.32 (H) 01/07/2022     (H) 02/14/2022     (H) 02/11/2022     COAGS:   Lab Results   Component Value Date    PTT 31 12/29/2021    INR 1.23 (H) 05/08/2021     POC:   Lab Results   Component Value Date     (H) 05/10/2021     HEPATIC:   Lab Results   Component Value Date    ALBUMIN 2.9 (L) 01/05/2022    PROTTOTAL 7.0 01/05/2022    ALT 25 01/05/2022    AST 16 01/05/2022    ALKPHOS 105 01/05/2022    BILITOTAL 1.0 01/05/2022     OTHER:   Lab Results   Component Value Date    LACT 1.2 01/04/2022    A1C 8.5 (H) 12/28/2021    BETTIE 8.5 02/11/2022    MAG 2.2 01/06/2022    LIPASE 23 11/02/2010    TSH 2.45 04/08/2021    CRP 9.0 (H) 01/07/2022    SED 8 09/30/2020       Anesthesia Plan    ASA Status:  3      Anesthesia Type: MAC.     - Reason for MAC: straight local not clinically adequate              Consents    Anesthesia Plan(s) and associated risks, benefits, and realistic alternatives discussed. Questions answered and patient/representative(s) expressed understanding.    - Discussed:     - Discussed with:  Patient      - Extended Intubation/Ventilatory Support Discussed: No.      - Patient is DNR/DNI Status: No    Use of blood products discussed: No .     Postoperative Care    Pain management: Oral pain medications, Multi-modal analgesia, IV analgesics.   PONV prophylaxis: Ondansetron (or  other 5HT-3), Dexamethasone or Solumedrol     Comments:                Sabino Hanna MD

## 2022-02-14 NOTE — BRIEF OP NOTE
[facility]    Brief Operative Note    Pre-operative diagnosis: Diabetic polyneuropathy associated with type 2 diabetes mellitus (H) [E11.42]  Ulcer of left foot, with necrosis of bone (H) [L97.524]  Osteomyelitis of great toe of left foot (H) [M86.9]  Post-operative diagnosis Same as pre-operative diagnosis    Procedure: Procedure(s):  Partial left great toe amputation  Surgeon: Surgeon(s) and Role:     * Татьяна Mckeon DPM, Podiatry/Foot and Ankle Surgery - Primary   First Assist:  Luiz Grover DPM  Anesthesia: Combined MAC with Local   Estimated Blood Loss: 2 mL from 2/14/2022  1:04 PM to 2/14/2022  1:45 PM      Drains: None  Specimens:   ID Type Source Tests Collected by Time Destination   1 : left great toe tissue, assess for bone infection Tissue Toe, Left SURGICAL PATHOLOGY EXAM Татьяна Mckeon DPM, Podiatry/Foot and Ankle Surgery 2/14/2022  1:27 PM    A : left great toe tissue Tissue Toe, Left ANAEROBIC BACTERIAL CULTURE ROUTINE, GRAM STAIN, AEROBIC BACTERIAL CULTURE ROUTINE Татьяна Mckeon DPM, Podiatry/Foot and Ankle Surgery 2/14/2022  1:27 PM      Findings:   None.  Complications: None.  Implants: * No implants in log *

## 2022-02-14 NOTE — OP NOTE
Procedure Date: 02/14/2022    SURGEON:  Татьяна Mckeon DPM      FIRST ASSISTANT:  Luiz Grover DPM    PREOPERATIVE DIAGNOSES:     1.  Diabetic with neuropathy.  2.  Osteomyelitis, left great toe.  3.  Previous left second and third toe amputations.    POSTOPERATIVE DIAGNOSES:     1.  Diabetic with neuropathy.  2.  Osteomyelitis, left great toe.  3.  Previous left second and third toe amputations.    PROCEDURE:  Partial left great toe amputation due to osteomyelitis at the IPJ.    ANESTHESIA:  MAC with local.      HEMOSTASIS:  Electrocautery.    ESTIMATED BLOOD LOSS:  2 mL    SPECIMENS:  Left distal toe for path and culture.    INDICATIONS FOR PROCEDURE:  Mr. Elder is a 71-year-old diabetic male that presented to clinic with new ulceration to the lateral distal left great toe.  This probed to bone.  MRI was obtained and showed osteomyelitis in the distal phalanx.  It was discussed with the patient to go in and remove the infected bone, which would mean removing part of the toe to try to get infection under control and prevent spreading it or sepsis.  Risks, benefits and complications were discussed with the patient and no guarantees were made.  He wished to proceed with surgery.    DESCRIPTION OF PROCEDURE:  The patient was brought to the operating room and placed on the operating table in supine position.  Anesthesia was administered and local was injected.  The foot was prepped and draped using sterile technique.  Attention was directed to the distal aspect of the left great toe.  A fishmouth incision was made around the distal 1/2 of the left great toe full thickness down to bone.  The tissue was sharply dissected off of the bone and the distal toe was disarticulated at the intertarsal metatarsophalangeal joint.  The bone was noted to be somewhat fragmented of the distal phalanx bone.  The cartilage to the proximal phalanx bone was intact.  The bone was sent for path and culture from the distal phalanx.   Bleeding was controlled with electrocautery.  The skin was then reapproximated using 4-0 Prolene.  The patient's foot was placed in a dry dressing.  The patient tolerated the procedure and anesthesia well and was transferred to recovery with vital signs stable and vascular status intact.  The patient will be minimal heel weightbearing in a postop shoe.    Татьяна Mckeon DPM        D: 2022   T: 2022   MT: ANDRIA    Name:     PORTER YANCEYCitlaly  MRN:      [id number]        Account:        [id number]   :      1950           Procedure Date: 2022     Document: [id number]

## 2022-02-15 NOTE — TELEPHONE ENCOUNTER
Received call from pharmacy stating prior authorization needed for ondansetron (ZOFRAN-ODT) 4 MG ODT tab. Per pharmacy, anything over a 2 day supply would not require a PA. Please advise if additional medication may be sent or if PA needed.     Henri AGOSTO RN

## 2022-02-15 NOTE — TELEPHONE ENCOUNTER
Does not need any further zofran supply.     Please let nurse know.    Thanks,     Татьяна Mckeon DPM

## 2022-02-15 NOTE — TELEPHONE ENCOUNTER
No number listed for a return call.   Phone call to [facility] where prescription was sent.   Spoke to Barbara. She is not sure who called. Patient already paid $7 out of pocket. She will check with pharmacist and call back if we need to do anything. Otherwise, disregard.     ROLANDA Bates RN

## 2022-02-22 NOTE — PROGRESS NOTES
"Podiatry / Foot and Ankle Surgery Progress Note    February 22, 2022    Subject: Patient was seen for 1 week status post partial left great toe amputation due to osteomyelitis.  Denies fever, nausea, vomiting.  No concerns today.    Objective:  Vitals: BP (!) 140/78   Ht 1.93 m (6' 4\")   Wt 120.2 kg (265 lb)   BMI 32.26 kg/m    BMI= Body mass index is 32.26 kg/m .    A1C: 8.5 (12/2021)    General:  Patient is alert and orientated.  NAD.    Vascular:  DP and PT pulses are palpable.  edema but no varicosities noted.  CFT's < 3secs.  Skin temp is normal.    Neuro:  Light and gross touch sensation absent to feet.    Derm: Dressing is clean dry and intact.  Sutures are intact.  No redness, dehiscence or signs of acute infection noted.  Preulcerative hyperkeratotic area noted to the medial plantar aspect of the left first metatarsal head.    Musculoskeletal:  Partial left great toe amputation.      Assessment:    Post-operative state  Diabetic polyneuropathy associated with type 2 diabetes mellitus (H)  Amputation of left great toe (H)    Medical Decision Making/Plan: Dressing was changed.  Patient will continue minimal weightbearing in postop shoe.  We will have him follow-up weekly for the next 3 weeks until the stitches are ready to come out.  He will continue to keep the foot dressing dry.  All questions were answered to patient satisfaction and he will call for the questions or concerns.      Patient Risk Factor:  Patient is a high risk factor for infection.     Татьяна Mckeon DPM, Podiatry/Foot and Ankle Surgery            "

## 2022-02-22 NOTE — LETTER
"    2/22/2022         RE: Jayro Elder  [address] [place] [address]        Dear Colleague,    Thank you for referring your patient, Jayro Elder, to the [facility]. Please see a copy of my visit note below.    Podiatry / Foot and Ankle Surgery Progress Note    February 22, 2022    Subject: Patient was seen for 1 week status post partial left great toe amputation due to osteomyelitis.  Denies fever, nausea, vomiting.  No concerns today.    Objective:  Vitals: BP (!) 140/78   Ht 1.93 m (6' 4\")   Wt 120.2 kg (265 lb)   BMI 32.26 kg/m    BMI= Body mass index is 32.26 kg/m .    A1C: 8.5 (12/2021)    General:  Patient is alert and orientated.  NAD.    Vascular:  DP and PT pulses are palpable.  edema but no varicosities noted.  CFT's < 3secs.  Skin temp is normal.    Neuro:  Light and gross touch sensation absent to feet.    Derm: Dressing is clean dry and intact.  Sutures are intact.  No redness, dehiscence or signs of acute infection noted.  Preulcerative hyperkeratotic area noted to the medial plantar aspect of the left first metatarsal head.    Musculoskeletal:  Partial left great toe amputation.      Assessment:    Post-operative state  Diabetic polyneuropathy associated with type 2 diabetes mellitus (H)  Amputation of left great toe (H)    Medical Decision Making/Plan: Dressing was changed.  Patient will continue minimal weightbearing in postop shoe.  We will have him follow-up weekly for the next 3 weeks until the stitches are ready to come out.  He will continue to keep the foot dressing dry.  All questions were answered to patient satisfaction and he will call for the questions or concerns.      Patient Risk Factor:  Patient is a high risk factor for infection.     Татьяна Mckeon DPM, Podiatry/Foot and Ankle Surgery                Again, thank you for allowing me to participate in the care of your patient.        Sincerely,        Татьяна Mckeon DPM, " Podiatry/Foot and Ankle Surgery

## 2022-03-01 NOTE — PROGRESS NOTES
"Podiatry / Foot and Ankle Surgery Progress Note    March 1, 2022    Subject: Patient was seen for 2 week status post partial left great toe amputation due to osteomyelitis.  Denies fever, nausea, vomiting.  No concerns today    Objective:  Vitals: /64   Ht 1.93 m (6' 4\")   Wt 120.2 kg (265 lb)   BMI 32.26 kg/m    BMI= Body mass index is 32.26 kg/m .    A1C: 8.5 (12/2021)     General:  Patient is alert and orientated.  NAD.     Vascular:  DP and PT pulses are palpable.  edema but no varicosities noted.  CFT's < 3secs.  Skin temp is normal.     Neuro:  Light and gross touch sensation absent to feet.     Derm: Dressing is clean dry and intact.  Sutures are intact.  No redness, dehiscence or signs of acute infection noted.  Preulcerative hyperkeratotic area noted to the medial plantar aspect of the left first metatarsal head.     Musculoskeletal:  Partial left great toe amputation.       Assessment:    Post-operative state  Diabetic polyneuropathy associated with type 2 diabetes mellitus (H)  Amputation of left great toe (H)     Medical Decision Making/Plan: Dressing was changed.  Patient will continue minimal weightbearing in postop shoe.  We will have him follow-up weekly for the next 2 weeks until the stitches are ready to come out.  He will continue to keep the foot dressing dry.  All questions were answered to patient satisfaction and he will call for the questions or concerns.        Patient Risk Factor:  Patient is a high risk factor for infection. .     Татьяна Mckeon DPM, Podiatry/Foot and Ankle Surgery    "

## 2022-03-01 NOTE — LETTER
"    3/1/2022         RE: Jayro Elder  [address] [place] [address]        Dear Colleague,    Thank you for referring your patient, Jayro Elder, to the [facility]ODI[facility]. Please see a copy of my visit note below.    Podiatry / Foot and Ankle Surgery Progress Note    March 1, 2022    Subject: Patient was seen for 2 week status post partial left great toe amputation due to osteomyelitis.  Denies fever, nausea, vomiting.  No concerns today    Objective:  Vitals: /64   Ht 1.93 m (6' 4\")   Wt 120.2 kg (265 lb)   BMI 32.26 kg/m    BMI= Body mass index is 32.26 kg/m .    A1C: 8.5 (12/2021)     General:  Patient is alert and orientated.  NAD.     Vascular:  DP and PT pulses are palpable.  edema but no varicosities noted.  CFT's < 3secs.  Skin temp is normal.     Neuro:  Light and gross touch sensation absent to feet.     Derm: Dressing is clean dry and intact.  Sutures are intact.  No redness, dehiscence or signs of acute infection noted.  Preulcerative hyperkeratotic area noted to the medial plantar aspect of the left first metatarsal head.     Musculoskeletal:  Partial left great toe amputation.       Assessment:    Post-operative state  Diabetic polyneuropathy associated with type 2 diabetes mellitus (H)  Amputation of left great toe (H)     Medical Decision Making/Plan: Dressing was changed.  Patient will continue minimal weightbearing in postop shoe.  We will have him follow-up weekly for the next 2 weeks until the stitches are ready to come out.  He will continue to keep the foot dressing dry.  All questions were answered to patient satisfaction and he will call for the questions or concerns.        Patient Risk Factor:  Patient is a high risk factor for infection. .     Татьяна Mckeon DPM, Podiatry/Foot and Ankle Surgery        Again, thank you for allowing me to participate in the care of your patient.        Sincerely,        Татьяна Mckeon DPM, " Podiatry/Foot and Ankle Surgery

## 2022-03-07 NOTE — PROGRESS NOTES
"Jayro Elder is a 71 year old male being evaluated via a billable telephone visit.     \"This telephone visit will be conducted via a call between you and your physician/provider. We have found that certain health care needs can be provided without the need for an in-person visit or physical exam.  This service lets us provide the care you need with a telephone conversation.  If a prescription is necessary we can send it directly to your pharmacy.  If lab work is needed we can place an order for that and you can then stop by our lab to have the test done at a later time.\"    Telephone visits are billed at different rates depending on your insurance coverage.  Please reach out to your insurance provider with any questions.    Patient has given verbal consent for  a Telephone visit? Yes    What telephone number would you like your provider to contact at at:  [phone] (home)     How would you like to obtain your AVS? Mail a copy    TIP Le[place]    Telephone Visit Details:     Telephone Visit Start Time: 12:34 PM    Telephone Visit End Time:1:17 PM    "

## 2022-03-07 NOTE — PROGRESS NOTES
Outpatient Sleep Medicine Consultation:  March 6, 2022    Name: Jayro Elder MRN# [id number]   Age: 71 year old YOB: 1950     Date of Consultation: March 6, 2022  Consultation is requested by: LORRAINE Winter CNP  [address]  [place] [address] Jackelin Deleon  Primary care provider: Davion Cordova       Reason for Sleep Consult:     Jayro Elder is sent by Jackelin Deleon for a sleep consultation regarding MARISOL.    Patient s Reason for visit: he is not sure, probably snoring observed in hospital  Patient states problem(s) started:  Over 15 years ago  Jayro Elder's goals for this visit:  Reassess apnea           Assessment and Plan:     Summary Sleep Diagnoses and Recommendations:  (G47.30) Sleep apnea, unspecified type  (primary encounter diagnosis), (I63.432) Cerebrovascular accident (CVA) due to embolism of left posterior cerebral artery (H), (I25.5) Ischemic cardiomyopathy, (I48.20) Chronic atrial fibrillation (H)  Comment: Jayro presents for further evaluation/management of sleep apnea in the setting of cardiomyopathy (LVEF 41%), pulmonary HTN, and previous CVA. He had a sleep study, I believe with [facility], prior to 2005. He tried CPAP for about a year with a full face mask and could not get used to it, so he stopped. He does not seem eager to repeat a study. He said he would have to check with his insurance as medication costs have been hard to cope with. He also does not drive and they take care of their grandkids, so would need to coordinate getting a ride. He does snore loudly, but denies being told he stops breathing in his sleep. He has daytime sleepiness, dozing after dinner most nights, sometimes after lunch. He has HTN, age >50 (71), male gender. We do not have a neck measurement. His BMI is 32. He has risk for central apnea and Cheyne Cartagena breathing given his reduced LVEF and stroke history.  Plan: Comprehensive Sleep Study        In lab PSG.         Comorbid  Diagnoses:  type 2 diabetes, hypothyroidism, hypertension, CVA, untreated sleep apnea, CKD, paroxsymal atrial fibrillation, coronary artery disease, status post multivessel PCI, ischemic cardiomyopathy, and osteomyelitis, status post multiple lower extremity digit amputations.    ECHO 12/28/21:  Biplane LVEF is 41%.  The apex and mid to distal septum are severely hypokinetic.  There is moderate global hypokinesia of the left ventricle.  Diastolic Doppler findings (E/E' ratio and/or other parameters) suggest left  ventricular filling pressures are increased.  The right ventricle is normal size.  Moderately decreased right ventricular systolic function  There is mild (1+) tricuspid regurgitation.  Severe (>55mmHg) pulmonary hypertension is present.  Compared to the last echo of 4/2021 the EF is similar but estimated right  heart pressures are higher.      Summary Counseling:    Sleep Testing Reviewed  Obstructive Sleep Apnea Reviewed  Complications of Untreated Sleep Apnea Reviewed      Medical Decision-making:   Educational materials provided in instructions    Patient will follow up 2 weeks after sleep study.  Bennett Goltz, PA-C     Phone visit duration: 43 min     CC: Jackelin Deleon          History of Present Illness:   He is not exactly sure why he is here. He assumes it is because he was observed to snore loudly when in the hospital. He has snored as long as he can remember. He wakes with a dry mouth. He has not been told that he stops breathing in his sleep.  He dozes in the day after meals.   Past Sleep Evaluations: Had a study sometime before 2005, possibly with [facility]. He used it for a year with a full face mask and couldn't get used to it. His ESS is 6/24, but I would guess this is underestimated. He says his wife asks him why he dozes during commercials.     SLEEP-WAKE SCHEDULE:     Work/School Days: Patient goes to school/work:     Usually gets into bed at 12 AM or later   Takes patient about 5 min to forever  " to fall asleep  Has trouble falling asleep 2-3  nights per week. He has trouble shutting his mind off. Sometimes his feet and legs hurt.   Wakes up in the middle of the night 0-1  times per night due to restroom, uncertain reasons   He has trouble falling back asleep not very often   Patient is usually up at  8-9 AM  Uses alarm:  no    Weekends/Non-work Days/All Other Days:  His schedule is the same as weekdays    Sleep Need  Patient gets 5-6 hours sleep on average   Patient thinks he needs about \"half a day\" of sleep    Jayro Elder prefers to sleep in this position(s):   sides  Patient states they do the following activities in bed:  TV sometimes. Can't read well as vision is reduced after strokes.     Naps  Patient does not intentionally nap but falls asleep for 30 minutes daily after dinner, sometimes after lunch but not as often.  He feels better after a nap:  Not sure  Patient has had a driving accident or near-miss due to sleepiness/drowsiness:  Has not driven for several years, generally does not doze as a passenger.      SLEEP DISRUPTIONS:    Breathing/Snoring  Patient snores:   Other people complain about his snoring:    Patient has been told he stops breathing in his sleep:    He has issues with the following:  Dry mouth in the morning, occasionally wakes with a headache.    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     Movement:  Patient gets pain, discomfort, with an urge to move:  yes, almost nightly  It happens when He is resting: does not walk much due to problems with feet-6 amputated toes.    It happens more at night: yes  Patient has been told He kicks His legs at night: thinks he does, kicks covers off, but wife has not complained.  He is on 600-900 mg gabapentin at night for neuropathy. It works variably well depending on how much he has been on his feet.  He has a prescription for hydrocodone since having foot surgery, but does not take it.  His " ferritin was 116 in 4/2021. He has had consistently low Hgb, hematocrit and RBC.     Behaviors in Sleep:  Jayro Elder has experienced the following behaviors while sleeping:    Pt denies bruxism, sleep talking, sleep walking, and dream enactment behavior. Pt denies sleep paralysis, hypnagogue and cataplexy.     Removes upper plate at night.      Is there anything else you would like your sleep provider to know:  no      CAFFEINE AND OTHER SUBSTANCES:    Number of caffeinated beverages (coffee, tea, soda, energy drinks) that patient consumes 2-3 cups coffee per day:    Last caffeine use is usually:  noon  List of any prescribed or over the counter stimulants that patient takes:  no  List of any prescribed or over the counter sleep medication patient takes:  no  List of previous sleep medications that patient has tried:  no  Patient drinks alcohol to help them sleep:  sometimes  Patient drinks alcohol near bedtime:  sometimes  Has a glass of scotch 3 times per week    Family History:  Patient has a family member been diagnosed with a sleep disorder:  no      Social history:  He lives with is wife and grandkids.       SCALES:    EPWORTH SLEEPINESS SCALE      Ogden Sleepiness Scale ( MARY Maynard  1990-1997[facility]/English - Final version - 21 Nov 07 - [facility] [place].) 3/7/2022   Sitting and reading Moderate chance of dozing   Watching TV Moderate chance of dozing   Sitting, inactive in a public place (e.g. a theatre or a meeting) Would never doze   As a passenger in a car for an hour without a break Would never doze   Lying down to rest in the afternoon when circumstances permit Would never doze   Sitting and talking to someone Would never doze   Sitting quietly after a lunch without alcohol Moderate chance of dozing   In a car, while stopped for a few minutes in traffic Would never doze   Ogden Score (MC) 0   Ogden Score (Sleep) 6         INSOMNIA SEVERITY INDEX (JEANETTE)      Insomnia Severity Index  "(JEANETTE) 3/7/2022   Difficulty falling asleep 2   Difficulty staying asleep 0   Problems waking up too early 0   How SATISFIED/DISSATISFIED are you with your CURRENT sleep pattern? 2   How NOTICEABLE to others do you think your sleep problem is in terms of impairing the quality of your life? 2   How WORRIED/DISTRESSED are you about your current sleep problem? 0   To what extent do you consider your sleep problem to INTERFERE with your daily functioning (e.g. daytime fatigue, mood, ability to function at work/daily chores, concentration, memory, mood, etc.) CURRENTLY? 2   JEANETTE Total Score 8       Guidelines for Scoring/Interpretation:    Total score categories:  0-7 = No clinically significant insomnia   8-14 = Subthreshold insomnia   15-21 = Clinical insomnia (moderate severity)  22-28 = Clinical insomnia (severe)    Used via courtesy of www.Incluyeme.com.va.gov with permission from Vaughn Todd PhD., [facility]      STOP BANG     STOP BANG Questionnaire (  2008, the American Society of Anesthesiologists, Inc. [employer].) 3/7/2022   B/P Clinic: -   BMI Clinic: 32.26         GAD7    No flowsheet data found.      CAGE-AID    No flowsheet data found.    CAGE-AID reprinted with permission from the [place] Medical Journal, CALLI Campo. and JABARI Schmidt, \"Conjoint screening questionnaires for alcohol and drug abuse\" [place] Medical Journal 94: 135-140, 1995.      PATIENT HEALTH QUESTIONNAIRE-9 (PHQ - 9)    No flowsheet data found.    Developed by Koby Weber, Barbara King, Ammon Quinteros and colleagues, with an educational montserrat from [employer] No permission required to reproduce, translate, display or distribute.        Allergies:    Allergies   Allergen Reactions     No Known Allergies        Medications:    Current Outpatient Medications   Medication Sig Dispense Refill     apixaban ANTICOAGULANT (ELIQUIS) 5 MG tablet Take 1 tablet (5 mg) by mouth 2 times daily       " atorvastatin (LIPITOR) 40 MG tablet Take 1 tablet (40 mg) by mouth every evening 90 tablet 3     carvedilol (COREG) 25 MG tablet Take 1 tablet (25 mg) by mouth 2 times daily (with meals) 180 tablet 3     clopidogrel (PLAVIX) 75 MG tablet Take 1 tablet (75 mg) by mouth daily 90 tablet 3     Continuous Blood Gluc  (FREESTYLE CLARITA 2 READER SYSTM) DRAGAN 1 Device daily 1 each 3     Continuous Blood Gluc Sensor (FREESTYLE CLARITA 2 SENSOR SYSTM) MISC 1 Units 4 times daily (before meals and nightly) 1 each 3     furosemide (LASIX) 40 MG tablet Take 1 tablet (40 mg) by mouth daily 90 tablet 3     gabapentin (NEURONTIN) 300 MG capsule TAKE 2-3 CAPSULES (600-900 MG) BY MOUTH AT BEDTIME 180 capsule 1     HYDROcodone-acetaminophen (NORCO) 5-325 MG tablet Take 1-2 tablets by mouth every 4 hours as needed for moderate to severe pain 15 tablet 0     insulin aspart (NOVOLOG PEN) 100 UNIT/ML pen Inject 12 Units Subcutaneous 2 times daily (with meals) With breakfast and lunch (Patient taking differently: Inject 16 Units Subcutaneous 3 times daily (with meals) 16 at Breakfast, 18 at Dinner/Lunch and 16 at Supper.)       insulin glargine (LANTUS PEN) 100 UNIT/ML pen Inject 44 units subcutaneously once every morning       insulin pen needle (31G X 6 MM) 31G X 6 MM miscellaneous Use  as directed. 100 each 11     isosorbide mononitrate (IMDUR) 30 MG 24 hr tablet Take 1 tablet (30 mg) by mouth daily 90 tablet 1     levothyroxine (SYNTHROID, LEVOTHROID) 137 MCG tablet Take 1 tablet by mouth once every evening 90 tablet 3     lisinopril (ZESTRIL) 2.5 MG tablet Take 1 tablet (2.5 mg) by mouth daily 90 tablet 3     nitroGLYcerin (NITROSTAT) 0.4 MG sublingual tablet For chest pain place 1 tablet under the tongue every 5 minutes for 3 doses. If symptoms persist 5 minutes after 1st dose call 911. 15 tablet 3     ondansetron (ZOFRAN-ODT) 4 MG ODT tab Take 1-2 tablets (4-8 mg) by mouth every 8 hours as needed for nausea 4 tablet 0      pantoprazole (PROTONIX) 40 MG EC tablet TAKE 1 TABLET BY MOUTH EVERY MORNING BEFORE BREAKFAST 90 tablet 1     senna-docusate (SENOKOT-S/PERICOLACE) 8.6-50 MG tablet Take 1-2 tablets by mouth 2 times daily 30 tablet 0       Problem List:  Patient Active Problem List    Diagnosis Date Noted     Nausea 01/07/2022     Priority: Medium     Toe infection 01/04/2022     Priority: Medium     Lightheadedness 12/27/2021     Priority: Medium     Chest pain 12/27/2021     Priority: Medium     Weakness 12/27/2021     Priority: Medium     Dyspnea 12/27/2021     Priority: Medium     Chronic kidney disease, stage 3 (H) 06/24/2021     Priority: Medium     Polyneuropathy associated with underlying disease (H) 06/24/2021     Priority: Medium     Diabetic polyneuropathy associated with type 2 diabetes mellitus (H) 05/07/2021     Priority: Medium     Added automatically from request for surgery [id number]       Ulcer of left foot, with necrosis of bone (H) 05/07/2021     Priority: Medium     Added automatically from request for surgery [id number]       Osteomyelitis of third toe of left foot (H) 05/07/2021     Priority: Medium     Added automatically from request for surgery [id number]       Hammertoes of both feet 05/07/2021     Priority: Medium     Added automatically from request for surgery [id number]       H/O amputation of lesser toe, left (H) 05/07/2021     Priority: Medium     Added automatically from request for surgery [id number]       Osteomyelitis (H) 05/07/2021     Priority: Medium     Acute on chronic systolic congestive heart failure (H) 04/07/2021     Priority: Medium     Chest pain, unspecified type 04/07/2021     Priority: Medium     Dizziness 01/03/2021     Priority: Medium     Orthostatic hypotension 08/17/2020     Priority: Medium     Wound infection 07/17/2020     Priority: Medium     Diabetic infection of left foot (H) 07/16/2020     Priority: Medium     H/O amputation of lesser toe, right (H) 01/09/2020     Priority: Medium      Atrial fibrillation, unspecified type (H) 01/09/2020     Priority: Medium     Hypertension 07/15/2019     Priority: Medium     Unstable angina (H) 07/06/2019     Priority: Medium     Acute chest pain 07/05/2019     Priority: Medium     Post-operative state 05/06/2019     Priority: Medium     Acute osteomyelitis (H) 05/05/2019     Priority: Medium     History of CVA (cerebrovascular accident) 02/05/2019     Priority: Medium     CHF (congestive heart failure) (H) 01/17/2019     Priority: Medium     Bacteremia 09/14/2018     Priority: Medium     Cellulitis 09/06/2018     Priority: Medium     Cellulitis of left lower extremity 03/13/2018     Priority: Medium     Syncope, unspecified syncope type 03/13/2018     Priority: Medium     Diabetic ulcer of left midfoot associated with type 2 diabetes mellitus, with fat layer exposed (H) 03/13/2018     Priority: Medium     Type 2 diabetes mellitus with diabetic peripheral angiopathy without gangrene (H) 03/13/2018     Priority: Medium     Microalbuminuria 03/13/2018     Priority: Medium     X1       Homonymous hemianopsia, right 12/20/2017     Priority: Medium     Chronic atrial fibrillation (H) 06/22/2017     Priority: Medium     Status post coronary angiogram 06/13/2017     Priority: Medium     Coronary artery disease involving native coronary artery of native heart with angina pectoris (H) 06/09/2017     Priority: Medium     CVA (cerebral vascular accident) (H) 04/21/2017     Priority: Medium     Cardiomyopathy (H)      Priority: Medium     Health Care Home 11/18/2013     Priority: Medium       Status:  NA -no care coordination needs 11/19/13  Care Coordinator:  Lashaun Arrington [phone]  See Letters for HC Care Plan           Hyperlipidemia 05/11/2011     Priority: Medium     Hypothyroidism 05/11/2011     Priority: Medium     Overview:   Hypothyroidism Acquired       Non-toxic nodular goiter 05/11/2011     Priority: Medium     Overview:   LW Modifier:  biopsy benign  x 2  ; Goiter Nodular       CARDIOVASCULAR SCREENING; LDL GOAL LESS THAN 100 10/31/2010     Priority: Medium     Diabetes mellitus, type 2 (H) 07/14/2005     Priority: Medium     Problem list name updated by automated process. Provider to review       Cardiovascular disease 07/14/2005     Priority: Medium     anterior MI,  PTCA and stent placed in mid LAD  Problem list name updated by automated process. 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 Only residule: vision impaired.     Cerebral infarction (H)     eye sight decreased in peripheral of right side and blurry     Cerebral infarction (H) 2016     Congestive heart failure (H)      Diabetic ulcer of left midfoot associated with type 2 diabetes mellitus, with fat layer exposed (H) 3/13/2018     Essential hypertension 11/13/2002     Essential hypertension, benign 11/13/2002     Gastroesophageal reflux disease      Generalized osteoarthrosis, unspecified site 11/13/2002     Lightheadedness 12/27/2021     Microalbuminuria 3/13/2018    X1     Myocardial infarction (H)      Myocardial infarction (H) 06/28/2005     Neuropathy      Neuropathy 2016     Sepsis (H)       Sleep apnea     doesn't use it     Sleep apnea 2006     Stented coronary artery     x 10 stents     Substance abuse (H)      Substance abuse (H)      Syncope, unspecified syncope type 3/13/2018     Syncope, unspecified syncope type 03/13/2018     Thyroid disease     takes medicine     Thyroid disease 2005     Tobacco use disorder 11/13/2002     Type 2 diabetes mellitus (H) 2000     Type 2 diabetes mellitus with diabetic peripheral angiopathy without gangrene, unspecified long term insulin use status 2005     Past Surgical History:   Procedure Laterality Date     AMPUTATE TOE(S) Right 1/6/2019    Procedure: Right open second toe partial amputation;  Surgeon: Sabino Garcia DPM;  Location: [facility] OR     AMPUTATE TOE(S) Right 1/8/2019    Procedure: 1.  Revision right second toe amputation with resection of distal half of proximal phalanx with full closure.    2.  Debridement of callus/preulcerative lesion, distal left second toe and plantar left first metatarsophalangeal joint.;  Surgeon: Ryan Bhagat DPM;  Location: [facility] OR     AMPUTATE TOE(S) Right 3/12/2019    Procedure: Partial right fourth toe amputation.;  Surgeon: Ryan Bhagat DPM;  Location: [facility] OR     AMPUTATE TOE(S) Right 5/6/2019    Procedure: Right partial third toe amputation;  Surgeon: Татьяна Mckeon DPM, Podiatry/Foot and Ankle Surgery;  Location: [facility] OR     AMPUTATE TOE(S) Left 4/18/2020    Procedure: LEFT SECOND TOE AMPUTATION;  Surgeon: Ryan Bhagat DPM;  Location: [facility] OR     AMPUTATE TOE(S) Left 5/8/2021    Procedure: 1.  Amputation of the left third toe at the level of the proximal interphalangeal joint. 2.  Excisional debridement of less than 20 square cm down to the level of the deeper skin, plantar left foot.;  Surgeon: Kwasi Root DPM;  Location: [facility] OR     AMPUTATE TOE(S) Right 2019    4 toes amputation      AMPUTATE TOE(S) Left 2/14/2022    Procedure: Partial left great toe amputation due to osteomyelitis at the  interphalangeal joint;  Surgeon: Татьяна Mckeon DPM, Podiatry/Foot and Ankle Surgery;  Location: [facility] OR     ANGIOGRAM  6/29/05    subtotal occ.mid LAD,SUSAN mid LAD     ANGIOGRAM  7/05    mild CAD,patent stent,no flow-limiting lesions,sev.LV dysf.LAD enlarged     ANGIOGRAM  2/09    Sev.single vessel disease,Mod LV dysf.distal LAD 90%,70-75% mid lad just before prev stent,SUSAN to prox.mid LAD, endeavor to distal LAD     ANGIOGRAM  11/13/13    restenosis, stent LAD     BACK SURGERY      back surgery x3     CARDIAC CATHETERIZATION  07/08/2019     CARDIAC CATHETERIZATION Left 06/13/2017     CARDIAC CATHETERIZATION  2005,2009,20013     CORONARY STENT PLACEMENT  07/08/2019     CV CORONARY ANGIOGRAM N/A 7/8/2019    Procedure: Coronary Angiogram;  Surgeon: Jose Alfredo Crockett MD;  Location: [facility] CARDIAC CATH LAB     CV CORONARY ANGIOGRAM N/A 4/9/2021    Procedure: CV CORONARY ANGIOGRAM;  Surgeon: Warren Paulson MD;  Location: [facility] CARDIAC CATH LAB     CV HEART CATHETERIZATION WITH POSSIBLE INTERVENTION N/A 12/28/2021    Procedure: Coronary Angiogram;  Surgeon: Jose Alfredo Crockett MD;  Location: [facility] CARDIAC CATH LAB     CV INSTANTANEOUS WAVE-FREE RATIO N/A 12/28/2021    Procedure: Instantaneous Wave-Free Ratio;  Surgeon: Jose Alfredo Crockett MD;  Location:  HEART CARDIAC CATH LAB     CV LEFT HEART CATH N/A 7/8/2019    Procedure: Left Heart Cath;  Surgeon: Jose Alfredo Crockett MD;  Location:  HEART CARDIAC CATH LAB     CV LEFT HEART CATH N/A 12/28/2021    Procedure: Left Heart Cath;  Surgeon: Jose Alfredo Crockett MD;  Location:  HEART CARDIAC CATH LAB     CV LEFT VENTRICULOGRAM N/A 12/28/2021    Procedure: Left Ventriculogram;  Surgeon: Jose Alfredo Crockett MD;  Location: [facility] CARDIAC CATH LAB     CV PCI ASPIRATION THROMECTOMY N/A 7/8/2019    Procedure: PCI Aspiration Thrombectomy;  Surgeon: Jose Alfredo Crockett MD;  Location: [facility] CARDIAC CATH LAB     CV PCI STENT DRUG ELUTING N/A 7/8/2019    Procedure: PCI  Stent Drug Eluting;  Surgeon: Jose Alfredo Crockett MD;  Location:  HEART CARDIAC CATH LAB     HEART CATH LEFT HEART CATH  2017    SUSAN to OM3     INCISION AND DRAINAGE TOE, COMBINED Bilateral 2018    Procedure: COMBINED INCISION AND DRAINAGE TOE;  1) Irrigation and debridement ulcer left great toe  2) Amputation 3rd toe right foot at distal interphalangeal joint level;  Surgeon: Miki Harp MD;  Location: [facility] OR     IRRIGATION AND DEBRIDEMENT FOOT, COMBINED Left 3/12/2019    Procedure: Debridement of left foot ulcer sub first MPJ 2 x 2.5 cm down to and including subcutaneous tissue, callus debridement for preulcerative lesion, left second toe.;  Surgeon: Ryan Bhagat DPM;  Location:  OR     ORTHOPEDIC SURGERY      bunion surgery both feet     ORTHOPEDIC SURGERY      left shoulder     OTHER SURGICAL HISTORY  9 years old    cancer tumor mouth     SHOULDER ARTHROSCOPY W/ ROTATOR CUFF REPAIR Left 2004     SOFT TISSUE SURGERY      debridement of toe numerous time     VASCULAR SURGERY      7 stents in heart     VASCULAR SURGERY       [facility] NONSPECIFIC PROCEDURE      Laminectomy x 3 - (1983 x 2 & )     [facility] NONSPECIFIC PROCEDURE Bilateral 1998    Bunionectomy     [facility] NONSPECIFIC PROCEDURE  1959    Gingival surgery at age 9       Social History:  Social History     Socioeconomic History     Marital status:      Spouse name: Not on file     Number of children: Not on file     Years of education: Not on file     Highest education level: Not on file   Occupational History     Not on file   Tobacco Use     Smoking status: Former Smoker     Packs/day: 1.00     Years: 25.00     Pack years: 25.00     Types: Cigarettes     Start date:      Quit date: 2005     Years since quittin.6     Smokeless tobacco: Never Used   Vaping Use     Vaping Use: Never used   Substance and Sexual Activity     Alcohol use: Yes     Alcohol/week: 4.0 standard drinks     Types: 4 Shots of liquor per week  "    Comment: 3x/week: Glass of Scotch.     Drug use: No     Sexual activity: Yes     Partners: Female   Other Topics Concern     Parent/sibling w/ CABG, MI or angioplasty before 65F 55M? Yes      Service Not Asked     Blood Transfusions Not Asked     Caffeine Concern No     Comment: 3 cups daily     Occupational Exposure Not Asked     Hobby Hazards Not Asked     Sleep Concern Not Asked     Stress Concern Not Asked     Weight Concern Not Asked     Special Diet Yes     Comment: watching carb intake     Back Care Not Asked     Exercise No     Comment: some walking     Bike Helmet Not Asked     Seat Belt Not Asked     Self-Exams Not Asked   Social History Narrative     Not on file     Social Determinants of Health     Financial Resource Strain: Not on file   Food Insecurity: Not on file   Transportation Needs: Not on file   Physical Activity: Not on file   Stress: Not on file   Social Connections: Not on file   Intimate Partner Violence: Not on file   Housing Stability: Not on file       Family History:  Family History   Problem Relation Age of Onset     Cancer - colorectal Sister      Thyroid Disease Brother      Cardiovascular Brother      Diabetes Brother      Cancer Father         tumor in chest and throat     Arthritis Mother      Thyroid Disease Mother      Diabetes Mother      Glaucoma No family hx of      Macular Degeneration No family hx of        Review of Systems:  Not completed    Physical Examination:  Vitals: Ht 1.93 m (6' 4\")   Wt 120.2 kg (265 lb)   BMI 32.26 kg/m             Phone visit no exam         Data: All pertinent previous laboratory data reviewed     Recent Labs   Lab Test 02/14/22  1350 02/14/22  1143 02/11/22  1010 01/07/22  0839 01/07/22  0652   NA  --   --  137  --  135   POTASSIUM  --   --  4.0  --  3.9   CHLORIDE  --   --  106  --  103   CO2  --   --  26  --  26   ANIONGAP  --   --  5  --  6   * 157* 196*   < > 160*   BUN  --   --  17  --  18   CR  --   --  1.10  --  " 1.32*   BETTIE  --   --  8.5  --  9.3    < > = values in this interval not displayed.       Recent Labs   Lab Test 01/05/22  0631   PROTTOTAL 7.0   ALBUMIN 2.9*   BILITOTAL 1.0   ALKPHOS 105   AST 16   ALT 25       No results found for: PH, PHARTERIAL, PO2, [id number], SAT, PCO2, HCO3, BASEEXCESS, NAWAF, BEB    @LABRCNTIPR(phv:4,pco2v:4,po2v:4,hco3v:4,yojana:4,o2per:4)@    TSH (mU/L)   Date Value   04/08/2021 2.45   10/09/2019 1.80       No results found for: UAMP, UBARB, BENZODIAZEUR, UCANN, UCOC, OPIT, UPCP    Ferritin   Date/Time Value Ref Range Status   04/08/2021 04:26  26 - 388 ng/mL Final     Iron   Date/Time Value Ref Range Status   04/08/2021 04:26 AM 43 35 - 180 ug/dL Final       Echocardiology: No results found for this or any previous visit (from the past 4320 hour(s)).    Chest x-ray: XR Chest 2 Views 12/27/2021    Narrative  CHEST TWO VIEWS 12/27/2021 3:47 PM    HISTORY: Chest pain.    COMPARISON: 4/7/2021.    Impression  IMPRESSION: There are no acute infiltrates. The cardiac silhouette is  not enlarged. Pulmonary vasculature is unremarkable.    YE GAONA MD      SYSTEM ID:  [id number]      Chest CT: No results found for this or any previous visit from the past 365 days.      PFT: Most Recent [facility] Pulmonary Function Testing    No results found for: 200[address]      Bennett Ezra Goltz, PA-C, [facility] 3/6/2022

## 2022-03-07 NOTE — PATIENT INSTRUCTIONS
"      MY TREATMENT INFORMATION FOR SLEEP APNEA-  Jayro Elder    DOCTOR : Bennett Ezra Goltz, PA-C, [facility]    Am I having a sleep study at a sleep center?  --->Due to normal delays, you will be contacted within 2-4 weeks to schedule    Am I having a home sleep study?  --->Watch the video for the device you are using:    -/drop off device-   https://www.Clarabridge.com/watch?v=yGGFBdELGhk    -Disposable device sent out require phone/computer application-   https://www.Clarabridge.com/watch?v=BCce_vbiwxE      Frequently asked questions:  1. What is Obstructive Sleep Apnea (MARISOL)? MARISOL is the most common type of sleep apnea. Apnea means, \"without breath.\"  Apnea is most often caused by narrowing or collapse of the upper airway as muscles relax during sleep.   Almost everyone has occasional apneas. Most people with sleep apnea have had brief interruptions at night frequently for many years.  The severity of sleep apnea is related to how frequent and severe the events are.   2. What are the consequences of MARISOL? Symptoms include: feeling sleepy during the day, snoring loudly, gasping or stopping of breathing, trouble sleeping, and occasionally morning headaches or heartburn at night.  Sleepiness can be serious and even increase the risk of falling asleep while driving. Other health consequences may include development of high blood pressure and other cardiovascular disease in persons who are susceptible. Untreated MARISOL  can contribute to heart disease, stroke and diabetes.   3. What are the treatment options? In most situations, sleep apnea is a lifelong disease that must be managed with daily therapy. Medications are not effective for sleep apnea and surgery is generally not considered until other therapies have been tried. Your treatment is your choice . Continuous Positive Airway (CPAP) works right away and is the therapy that is effective in nearly everyone. An oral device to hold your jaw forward is usually the next " most reliable option. Other options include postioning devices (to keep you off your back), weight loss, and surgery including a tongue pacing device. There is more detail about some of these options below.  4. Are my sleep studies covered by insurance? Although we will request verification of coverage, we advise you also check in advance of the study to ensure there is coverage.    Important tips for those choosing CPAP and similar devices   Know your equipment:  CPAP is continuous positive airway pressure that prevents obstructive sleep apnea by keeping the throat from collapsing while you are sleeping. In most cases, the device is  smart  and can slowly self-adjusts if your throat collapses and keeps a record every day of how well you are treated-this information is available to you and your care team.  BPAP is bilevel positive airway pressure that keeps your throat open and also assists each breath with a pressure boost to maintain adequate breathing.  Special kinds of BPAP are used in patients who have inadequate breathing from lung or heart disease. In most cases, the device is  smart  and can slowly self-adjusts to assist breathing. Like CPAP, the device keeps a record of how well you are treated.  Your mask is your connection to the device. You get to choose what feels most comfortable and the staff will help to make sure if fits. Here: are some examples of the different masks that are available:       Key points to remember on your journey with sleep apnea:  1. Sleep study.  PAP devices often need to be adjusted during a sleep study to show that they are effective and adjusted right.  2. Good tips to remember: Try wearing just the mask during a quiet time during the day so your body adapts to wearing it. A humidifier is recommended for comfort in most cases to prevent drying of your nose and throat. Allergy medication from your provider may help you if you are having nasal congestion.  3. Getting  settled-in. It takes more than one night for most of us to get used to wearing a mask. Try wearing just the mask during a quiet time during the day so your body adapts to wearing it. A humidifier is recommended for comfort in most cases. Our team will work with you carefully on the first day and will be in contact within 4 days and again at 2 and 4 weeks for advice and remote device adjustments. Your therapy is evaluated by the device each day.   4. Use it every night. The more you are able to sleep naturally for 7-8 hours, the more likely you will have good sleep and to prevent health risks or symptoms from sleep apnea. Even if you use it 4 hours it helps. Occasionally all of us are unable to use a medical therapy, in sleep apnea, it is not dangerous to miss one night.   5. Communicate. Call our skilled team on the number provided on the first day if your visit for problems that make it difficult to wear the device. Over 2 out of 3 patients can learn to wear the device long-term with help from our team. Remember to call our team or your sleep providers if you are unable to wear the device as we may have other solutions for those who cannot adapt to mask CPAP therapy. It is recommended that you sleep your sleep provider within the first 3 months and yearly after that if you are not having problems.   6. Use it for your health. We encourage use of CPAP masks during daytime quiet periods to allow your face and brain to adapt to the sensation of CPAP so that it will be a more natural sensation to awaken to at night or during naps. This can be very useful during the first few weeks or months of adapting to CPAP though it does not help medically to wear CPAP during wakefulness and  should not be used as a strategy just to meet guidelines.  7. Take care of your equipment. Make sure you clean your mask and tubing using directions every day and that your filter and mask are replaced as recommended or if they are not  working.     BESIDES CPAP, WHAT OTHER THERAPIES ARE THERE?    Positioning Device  Positioning devices are generally used when sleep apnea is mild and only occurs on your back.This example shows a pillow that straps around the waist. It may be appropriate for those whose sleep study shows milder sleep apnea that occurs primarily when lying flat on one's back. Preliminary studies have shown benefit but effectiveness at home may need to be verified by a home sleep test. These devices are generally not covered by medical insurance.  Examples of devices that maintain sleeping on the back to prevent snoring and mild sleep apnea.    Belt type body positioner  http://VoiceObjects.LineHop/    Electronic reminder  http://nightshifttherapy.com/  http://www.Pocket Concierge.LineHop.au/      Oral Appliance  What is oral appliance therapy?  An oral appliance device fits on your teeth at night like a retainer used after having braces. The device is made by a specialized dentist and requires several visits over 1-2 months before a manufactured device is made to fit your teeth and is adjusted to prevent your sleep apnea. Once an oral device is working properly, snoring should be improved. A home sleep test may be recommended at that time if to determine whether the sleep apnea is adequately treated.       Some things to remember:  -Oral devices are often, but not always, covered by your medical insurance. Be sure to check with your insurance provider.   -If you are referred for oral therapy, you will be given a list of specialized dentists to consider or you may choose to visit the Web site of the American Academy of Dental Sleep Medicine  -Oral devices are less likely to work if you have severe sleep apnea or are extremely overweight.     More detailed information  An oral appliance is a small acrylic device that fits over the upper and lower teeth  (similar to a retainer or a mouth guard). This device slightly moves jaw forward, which moves the base  of the tongue forward, opens the airway, improves breathing for effective treat snoring and obstructive sleep apnea in perhaps 7 out of 10 people .  The best working devices are custom-made by a dental device  after a mold is made of the teeth 1, 2, 3.  When is an oral appliance indicated?  Oral appliance therapy is recommended as a first-line treatment for patients with primary snoring, mild sleep apnea, and for patients with moderate sleep apnea who prefer appliance therapy to use of CPAP4, 5. Severity of sleep apnea is determined by sleep testing and is based on the number of respiratory events per hour of sleep.   How successful is oral appliance therapy?  The success rate of oral appliance therapy in patients with mild sleep apnea is 75-80% while in patients with moderate sleep apnea it is 50-70%. The chance of success in patients with severe sleep apnea is 40-50%. The research also shows that oral appliances have a beneficial effect on the cardiovascular health of MARISOL patients at the same magnitude as CPAP therapy7.  Oral appliances should be a second-line treatment in cases of severe sleep apnea, but if not completely successful then a combination therapy utilizing CPAP plus oral appliance therapy may be effective. Oral appliances tend to be effective in a broad range of patients although studies show that the patients who have the highest success are females, younger patients, those with milder disease, and less severe obesity. 3, 6.   Finding a dentist that practices dental sleep medicine  Specific training is available through the American Academy of Dental Sleep Medicine for dentists interested in working in the field of sleep. To find a dentist who is educated in the field of sleep and the use of oral appliances, near you, visit the Web site of the American Academy of Dental Sleep Medicine.    References  1. charan Herrera al. Objectively measured vs self-reported compliance during oral  appliance therapy for sleep-disordered breathing. Chest 2013; 144(5): 3299-3696.  2. Jimi, et al. Objective measurement of compliance during oral appliance therapy for sleep-disordered breathing. Thorax 2013; 68(1): 91-96.  3. Akash et al. Mandibular advancement devices in 620 men and women with MARISOL and snoring: tolerability and predictors of treatment success. Chest 2004; 125: 7853-0713.  4. Theresa, et al. Oral appliances for snoring and MARISOL: a review. Sleep 2006; 29: 244-262.  5. Meryl et al. Oral appliance treatment for MARISOL: an update. J Clin Sleep Med 2014; 10(2): 215-227.  6. Marck et al. Predictors of OSAH treatment outcome. J Dent Res 2007; 86: 7197-9810.      Weight Loss:    Weight loss is a long-term strategy that may improve sleep apnea in some patients.    Weight management is a personal decision and the decision should be based on your interest and the potential benefits.  If you are interested in exploring weight loss strategies, the following discussion covers the impact on weight loss on sleep apnea and the approaches that may be successful.    Being overweight does not necessarily mean you will have health consequences.  Those who have BMI over 35 or over 27 with existing medical conditions carries greater risk.   Weight loss decreases severity of sleep apnea in most people with obesity. For those with mild obesity who have developed snoring with weight gain, even 15-30 pound weight loss can improve and occasionally eliminate sleep apnea.  Structured and life-long dietary and health habits are necessary to lose weight and keep healthier weight levels.     Though there may be significant health benefits from weight loss, long-term weight loss is very difficult to achieve- studies show success with dietary management in less than 10% of people. In addition, substantial weight loss may require years of dietary control and may be difficult if patients have severe obesity. In these  cases, surgical management may be considered.  Finally, older individuals who have tolerated obesity without health complications may be less likely to benefit from weight loss strategies.        Your BMI is Body mass index is 32.26 kg/m .  Weight management is a personal decision.  If you are interested in exploring weight loss strategies, the following discussion covers the approaches that may be successful. Body mass index (BMI) is one way to tell whether you are at a healthy weight, overweight, or obese. It measures your weight in relation to your height.  A BMI of 18.5 to 24.9 is in the healthy range. A person with a BMI of 25 to 29.9 is considered overweight, and someone with a BMI of 30 or greater is considered obese. More than two-thirds of American adults are considered overweight or obese.  Being overweight or obese increases the risk for further weight gain. Excess weight may lead to heart disease and diabetes.  Creating and following plans for healthy eating and physical activity may help you improve your health.  Weight control is part of healthy lifestyle and includes exercise, emotional health, and healthy eating habits. Careful eating habits lifelong are the mainstay of weight control. Though there are significant health benefits from weight loss, long-term weight loss with diet alone may be very difficult to achieve- studies show long-term success with dietary management in less than 10% of people. Attaining a healthy weight may be especially difficult to achieve in those with severe obesity. In some cases, medications, devices and surgical management might be considered.  What can you do?  If you are overweight or obese and are interested in methods for weight loss, you should discuss this with your provider.     Consider reducing daily calorie intake by 500 calories.     Keep a food journal.     Avoiding skipping meals, consider cutting portions instead.    Diet combined with exercise helps  maintain muscle while optimizing fat loss. Strength training is particularly important for building and maintaining muscle mass. Exercise helps reduce stress, increase energy, and improves fitness. Increasing exercise without diet control, however, may not burn enough calories to loose weight.       Start walking three days a week 10-20 minutes at a time    Work towards walking thirty minutes five days a week     Eventually, increase the speed of your walking for 1-2 minutes at time    And look into health and wellness programs that may be available through your health insurance provider, employer, local community center, or domo club.    Weight management plan: Patient was referred to their PCP to discuss a diet and exercise plan.      Surgery:    Surgery for obstructive sleep apnea is considered generally only when other therapies fail to work. Surgery may be discussed with you if you are having a difficult time tolerating CPAP and or when there is an abnormal structure that requires surgical correction.  Nose and throat surgeries often enlarge the airway to prevent collapse.  Most of these surgeries create pain for 1-2 weeks and up to half of the most common surgeries are not effective throughout life.  You should carefully discuss the benefits and drawbacks to surgery with your sleep provider and surgeon to determine if it is the best solution for you.   More information  Surgery for MARISOL is directed at areas that are responsible for narrowing or complete obstruction of the airway during sleep.  There are a wide range of procedures available to enlarge and/or stabilize the airway to prevent blockage of breathing in the three major areas where it can occur: the palate, tongue, and nasal regions.  Successful surgical treatment depends on the accurate identification of the factors responsible for obstructive sleep apnea in each person.  A personalized approach is required because there is no single treatment that  works well for everyone.  Because of anatomic variation, consultation with an examination by a sleep surgeon is a critical first step in determining what surgical options are best for each patient.  In some cases, examination during sedation may be recommended in order to guide the selection of procedures.  Patients will be counseled about risks and benefits as well as the typical recovery course after surgery. Surgery is typically not a cure for a person s MARISOL.  However, surgery will often significantly improve one s MARISOL severity (termed  success rate ).  Even in the absence of a cure, surgery will decrease the cardiovascular risk associated with OSA7; improve overall quality of life8 (sleepiness, functionality, sleep quality, etc).      Palate Procedures:  Patients with MARISOL often have narrowing of their airway in the region of their tonsils and uvula.  The goals of palate procedures are to widen the airway in this region as well as to help the tissues resist collapse.  Modern palate procedure techniques focus on tissue conservation and soft tissue rearrangement, rather than tissue removal.  Often the uvula is preserved in this procedure. Residual sleep apnea is common in patient after pharyngoplasty with an average reduction in sleep apnea events of 33%2.      Tongue Procedures:  ExamWhile patients are awake, the muscles that surround the throat are active and keep this region open for breathing. These muscles relax during sleep, allowing the tongue and other structures to collapse and block breathing.  There are several different tongue procedures available.  Selection of a tongue base procedure depends on characteristics seen on physical exam.  Generally, procedures are aimed at removing bulky tissues in this area or preventing the back of the tongue from falling back during sleep.  Success rates for tongue surgery range from 50-62%3.    Hypoglossal Nerve Stimulation:  Hypoglossal nerve stimulation has recently  received approval from the [place] Food and Drug Administration for the treatment of obstructive sleep apnea.  This is based on research showing that the system was safe and effective in treating sleep apnea6.  Results showed that the median AHI score decreased 68%, from 29.3 to 9.0. This therapy uses an implant system that senses breathing patterns and delivers mild stimulation to airway muscles, which keeps the airway open during sleep.  The system consists of three fully implanted components: a small generator (similar in size to a pacemaker), a breathing sensor, and a stimulation lead.  Using a small handheld remote, a patient turns the therapy on before bed and off upon awakening.    Candidates for this device must be greater than 22 years of age, have moderate to severe MARISOL (AHI between 20-65), BMI less than 32, have tried CPAP/oral appliance without success, and have appropriate upper airway anatomy (determined by a sleep endoscopy performed by Dr. Harvey).    Hypoglossal Nerve Stimulation Pathway:    The sleep surgeon s office will work with the patient through the insurance prior-authorization process (including communications and appeals).    Nasal Procedures:  Nasal obstruction can interfere with nasal breathing during the day and night.  Studies have shown that relief of nasal obstruction can improve the ability of some patients to tolerate positive airway pressure therapy for obstructive sleep apnea1.  Treatment options include medications such as nasal saline, topical corticosteroid and antihistamine sprays, and oral medications such as antihistamines or decongestants. Non-surgical treatments can include external nasal dilators for selected patients. If these are not successful by themselves, surgery can improve the nasal airway either alone or in combination with these other options.      Combination Procedures:  Combination of surgical procedures and other treatments may be recommended,  particularly if patients have more than one area of narrowing or persistent positional disease.  The success rate of combination surgery ranges from 66-80%2,3.    References  1. Mendoza RECINOS. The Role of the Nose in Snoring and Obstructive Sleep Apnoea: An Update.  Eur Arch Otorhinolaryngol. 2011; 268: 1365-73.  2.  Brittney SM; Anshu JA; Pauly JR; Pallanch JF; Jo MB; David SG; Myke POTTER. Surgical modifications of the upper airway for obstructive sleep apnea in adults: a systematic review and meta-analysis. SLEEP 2010;33(10):7973-3363. Lyndon BRADY. Hypopharyngeal surgery in obstructive sleep apnea: an evidence-based medicine review.  Arch Otolaryngol Head Neck Surg. 2006 Feb;132(2):206-13.  3. Giovanni YH1, S[place] Y, Xavier ABENA. The efficacy of anatomically based multilevel surgery for obstructive sleep apnea. Otolaryngol Head Neck Surg. 2003 Oct;129(4):327-35.  4. Lyndon BRADY, Goldberg A. Hypopharyngeal Surgery in Obstructive Sleep Apnea: An Evidence-Based Medicine Review. Arch Otolaryngol Head Neck Surg. 2006 Feb;132(2):206-13.  5. Selene PJ et al. Upper-Airway Stimulation for Obstructive Sleep Apnea.  N Engl J Med. 2014 Jan 9;370(2):139-49.  6. Marcelino Y et al. Increased Incidence of Cardiovascular Disease in Middle-aged Men with Obstructive Sleep Apnea. Am J Respir Crit Care Med; 2002 166: 159-165  7. Molina EM et al. Studying Life Effects and Effectiveness of Palatopharyngoplasty (SLEEP) study: Subjective Outcomes of Isolated Uvulopalatopharyngoplasty. Otolaryngol Head Neck Surg. 2011; 144: 623-631.        WHAT IF I ONLY HAVE SNORING?      Mandibular advancement devices, lateral sleep positioning, long-term weight loss and treatment of nasal allergies have been shown to improve snoring.    Exercising tongue muscles with a game (https://www.ncbi.nlm.nih.gov/pubmed/69473681) or stimulating the tongue during the day with a device (https://doi.org/10.3390/dnf17591002) have improved snoring in some  individuals.    Remember to Drive Safe... Drive Alive     Sleep health profoundly affects your health, mood, and your safety.  Thirty three percent of the population (one in three of us) is not getting enough sleep and many have a sleep disorder. Not getting enough sleep or having an untreated / undertreated sleep condition may make us sleepy without even knowing it. In fact, our driving could be dramatically impaired due to our sleep health. As your provider, here are some things I would like you to know about driving:     Here are some warning signs for impairment and dangerous drowsy driving:              -Having been awake more than 16 hours               -Looking tired               -Eyelid drooping              -Head nodding (it could be too late at this point)              -Driving for more than 30 minutes     Some things you could do to make the driving safer if you are experiencing some drowsiness:              -Stop driving and rest              -Call for transportation              -Make sure your sleep disorder is adequately treated     Some things that have been shown NOT to work when experiencing drowsiness while driving:              -Turning on the radio              -Opening windows              -Eating any  distracting  /  entertaining  foods (e.g., sunflower seeds, candy, or any other)              -Talking on the phone      Your decision may not only impact your life, but also the life of others. Please, remember to drive safe for yourself and all of us.

## 2022-03-08 NOTE — PATIENT INSTRUCTIONS
Thank you for choosing [facility]!    DR ASENCIO'S CLINIC:  [facility]   [address]   [place] [address]      TRIAGE LINE: [phone] - [address]  APPOINTMENTS: [phone]  RADIOLOGY: [phone]  SET UP SURGERY: [phone]  BILLING QUESTIONS: [phone]  FAX: [phone]       Follow up:1 month, may cancel next weeks follow up

## 2022-03-08 NOTE — LETTER
"    3/8/2022         RE: Jayro Elder  [address] [place] [address]        Dear Colleague,    Thank you for referring your patient, Jayro Elder, to the [facility]. Please see a copy of my visit note below.    Podiatry / Foot and Ankle Surgery Progress Note    March 8, 2022    Subject: Patient was seen for 3 week status post partial left great toe amputation due to osteomyelitis.  Denies fever, nausea, vomiting.  No concerns today     Objective:  Vitals: /60   Ht 1.93 m (6' 4\")   Wt 120.2 kg (265 lb)   BMI 32.26 kg/m    BMI= Body mass index is 32.26 kg/m .    A1C: 8.5 (12/2021)     General:  Patient is alert and orientated.  NAD.     Vascular:  DP and PT pulses are palpable.  edema but no varicosities noted.  CFT's < 3secs.  Skin temp is normal.     Neuro:  Light and gross touch sensation absent to feet.     Derm: Dressing is clean dry and intact.  Sutures are intact.  No redness, dehiscence or signs of acute infection noted.  Preulcerative hyperkeratotic area noted to the medial plantar aspect of the left first metatarsal head.     Musculoskeletal:  Partial left great toe amputation.       Assessment:    Post-operative state  Diabetic polyneuropathy associated with type 2 diabetes mellitus (H)  Amputation of left great toe (H)     Medical Decision Making/Plan: At this time the sutures were removed.  Patient can get the foot wet.  He can lotion the foot.  He can go back to regular socks and shoes.  We will have him follow-up in 1 month for reassessment.  All questions were answered to patient satisfaction and he will call for the questions or concerns.        Patient Risk Factor:  Patient is a high risk factor for infection. .     Татьяна Mckeon DPM, Podiatry/Foot and Ankle Surgery        Again, thank you for allowing me to participate in the care of your patient.        Sincerely,        Татьяна Mckeon DPM, Podiatry/Foot and Ankle Surgery    "

## 2022-03-08 NOTE — PROGRESS NOTES
"Podiatry / Foot and Ankle Surgery Progress Note    March 8, 2022    Subject: Patient was seen for 3 week status post partial left great toe amputation due to osteomyelitis.  Denies fever, nausea, vomiting.  No concerns today     Objective:  Vitals: /60   Ht 1.93 m (6' 4\")   Wt 120.2 kg (265 lb)   BMI 32.26 kg/m    BMI= Body mass index is 32.26 kg/m .    A1C: 8.5 (12/2021)     General:  Patient is alert and orientated.  NAD.     Vascular:  DP and PT pulses are palpable.  edema but no varicosities noted.  CFT's < 3secs.  Skin temp is normal.     Neuro:  Light and gross touch sensation absent to feet.     Derm: Dressing is clean dry and intact.  Sutures are intact.  No redness, dehiscence or signs of acute infection noted.  Preulcerative hyperkeratotic area noted to the medial plantar aspect of the left first metatarsal head.     Musculoskeletal:  Partial left great toe amputation.       Assessment:    Post-operative state  Diabetic polyneuropathy associated with type 2 diabetes mellitus (H)  Amputation of left great toe (H)     Medical Decision Making/Plan: At this time the sutures were removed.  Patient can get the foot wet.  He can lotion the foot.  He can go back to regular socks and shoes.  We will have him follow-up in 1 month for reassessment.  All questions were answered to patient satisfaction and he will call for the questions or concerns.        Patient Risk Factor:  Patient is a high risk factor for infection. .     Татьяна Mckeon DPM, Podiatry/Foot and Ankle Surgery    "

## 2022-03-14 NOTE — PROGRESS NOTES
CARDIOLOGY VISIT    REASON FOR VISIT: ICM, afib    SUBJECTIVE:  71-year-old male seen for ischemic cardiomyopathy and A. fib.  He has diabetes type 2, hypothyroid, hypertension, stroke, untreated sleep apnea, CKD, history of osteomyelitis with previous lower extremity digit amputation.     He has a history of MI in 2005 with EF of 35 to 40% history of extensive LAD and OM stenting.  July 2019 underwent 3 stents to the RCA. (10 stents total).     Angiogram April 2021 showed 50 to 75% in-stent restenosis of the mid LAD with FFR 0.87, 40% in-stent restenosis of the RCA, 25% OM 3 in-stent restenosis, no intervention performed.  Echo showed EF 40 to 45%, normal RV, no valve disease.    December 2021 he presented with chest pain.  Echo December 2021 showed EF 40%, anteroseptal and apical hypokinesis, moderate RV hypokinesis, 1+ TR, RVSP 51 mmHg.  Angiogram showed mild to moderate in-stent restenosis of the LAD stents, IFR 0.91, other stents with mild narrowing, no intervention performed.    Overall he is feeling about the same.  Weight is down some, on home scale 248 pounds.  Blood pressure runs low 100/70.  He has an occasional episode of chest pain, but nothing frequent.  He has some chronic dyspnea with exertion.  He has had multiple toe amputations which limits his ambulation somewhat.    MEDICATIONS:  Current Outpatient Medications   Medication     apixaban ANTICOAGULANT (ELIQUIS) 5 MG tablet     atorvastatin (LIPITOR) 40 MG tablet     carvedilol (COREG) 25 MG tablet     clopidogrel (PLAVIX) 75 MG tablet     Continuous Blood Gluc  (FREESTYLE CLARITA 2 READER SYSTM) DRAGAN     Continuous Blood Gluc Sensor (FREESTYLE CLARITA 2 SENSOR SYSTM) MISC     furosemide (LASIX) 40 MG tablet     gabapentin (NEURONTIN) 300 MG capsule     HYDROcodone-acetaminophen (NORCO) 5-325 MG tablet     insulin aspart (NOVOLOG PEN) 100 UNIT/ML pen     insulin glargine (LANTUS PEN) 100 UNIT/ML pen     insulin pen needle (31G X 6 MM) 31G X 6  "MM miscellaneous     isosorbide mononitrate (IMDUR) 30 MG 24 hr tablet     levothyroxine (SYNTHROID, LEVOTHROID) 137 MCG tablet     lisinopril (ZESTRIL) 2.5 MG tablet     nitroGLYcerin (NITROSTAT) 0.4 MG sublingual tablet     ondansetron (ZOFRAN-ODT) 4 MG ODT tab     pantoprazole (PROTONIX) 40 MG EC tablet     No current facility-administered medications for this visit.       ALLERGIES:  Allergies   Allergen Reactions     No Known Allergies        REVIEW OF SYSTEMS:  Constitutional:  No weight loss, fever, chills  HEENT:  Eyes:  No visual loss, blurred vision, double vision or yellow sclerae. No hearing loss, sneezing, congestion, runny nose or sore throat.  Skin:  No rash or itching.  Cardiovascular: per HPI  Respiratory: per HPI  GI:  No anorexia, nausea, vomiting or diarrhea. No abdominal pain or blood.  :  No dysurea, hematuria  Neurologic:  No headache, paralysis, ataxia, numbness or tingling in the extremities. No change in bowel or bladder control.  Musculoskeletal:  No muscle pain  Hematologic:  No bleeding or bruising.  Lymphatics:  No enlarged nodes. No history of splenectomy.  Endocrine:  No reports of sweating, cold or heat intolerance. No polyuria or polydipsia.  Allergies:  No history of asthma, hives, eczema or rhinitis.    PHYSICAL EXAM:  BP 98/64 (BP Location: Right arm, Patient Position: Sitting, Cuff Size: Adult Large)   Pulse 72   Ht 1.93 m (6' 4\")   Wt 116.2 kg (256 lb 3.2 oz)   SpO2 99%   BMI 31.19 kg/m      Constitutional: awake, alert, no distress  Eyes: PERRL, sclera nonicteric  ENT: trachea midline  Respiratory: Lungs clear  Cardiovascular: Regular rate, totally irregular rhythm, no murmurs, trace edema of the lower shins  GI: nondistended, nontender, bowel sounds present  Lymph/Hematologic: no lymphadenopathy  Skin: dry, no rash  Musculoskeletal: good muscle tone, strength 5/5 in upper and lower extremities  Neurologic: no focal deficits  Neuropsychiatric: appropriate " affact    DATA:  Lab: February 2022: Potassium 4.0, creatinine 1.1  Recent Labs   Lab Test 04/08/21  0426 08/13/20  1028 06/30/16  0849 02/02/15  1016   CHOL 79 107   < > 99   HDL 36* 45   < > 43   LDL 33 53   < > 42   TRIG 52 44   < > 68   CHOLHDLRATIO  --   --   --  2.3    < > = values in this interval not displayed.     ASSESSMENT:  71-year-old male seen for ischemic cardiomyopathy.  He is overall quite stable.  He appears euvolemic on exam.  Medications will be continued.  Current dry weight is probably around 245 pounds on his home scale.    RECOMMENDATIONS:  1.  Ischemic cardiomyopathy, NYHA class II symptoms  -Continue current medications  -Continue indefinite clopidogrel due to multiple stents    2.  Chronic A. Fib  -Rates controlled, continue Eliquis    Follow-up in 6 months with FLORA, check lipids.    Livan King MD  Cardiology - [facility]  Pager:  [phone]  Text Page  March 17, 2022

## 2022-03-17 NOTE — PATIENT INSTRUCTIONS
Keep medications the same.    We will see you back in 6 months.    If you have any questions, please call [phone] to speak with one of our cardiology nurses.

## 2022-03-17 NOTE — LETTER
3/17/2022    Davion Cordova PA-C  [address] [place] [address]    RE: Jayro Elder       Dear Colleague,     I had the pleasure of seeing Jayro Elder in the [facility].  CARDIOLOGY VISIT    REASON FOR VISIT: ICM, afib    SUBJECTIVE:  71-year-old male seen for ischemic cardiomyopathy and A. fib.  He has diabetes type 2, hypothyroid, hypertension, stroke, untreated sleep apnea, CKD, history of osteomyelitis with previous lower extremity digit amputation.     He has a history of MI in 2005 with EF of 35 to 40% history of extensive LAD and OM stenting.  July 2019 underwent 3 stents to the RCA. (10 stents total).     Angiogram April 2021 showed 50 to 75% in-stent restenosis of the mid LAD with FFR 0.87, 40% in-stent restenosis of the RCA, 25% OM 3 in-stent restenosis, no intervention performed.  Echo showed EF 40 to 45%, normal RV, no valve disease.    December 2021 he presented with chest pain.  Echo December 2021 showed EF 40%, anteroseptal and apical hypokinesis, moderate RV hypokinesis, 1+ TR, RVSP 51 mmHg.  Angiogram showed mild to moderate in-stent restenosis of the LAD stents, IFR 0.91, other stents with mild narrowing, no intervention performed.    Overall he is feeling about the same.  Weight is down some, on home scale 248 pounds.  Blood pressure runs low 100/70.  He has an occasional episode of chest pain, but nothing frequent.  He has some chronic dyspnea with exertion.  He has had multiple toe amputations which limits his ambulation somewhat.    MEDICATIONS:  Current Outpatient Medications   Medication     apixaban ANTICOAGULANT (ELIQUIS) 5 MG tablet     atorvastatin (LIPITOR) 40 MG tablet     carvedilol (COREG) 25 MG tablet     clopidogrel (PLAVIX) 75 MG tablet     Continuous Blood Gluc  (FREESTYLE CLARITA 2 READER SYSTM) DRAGAN     Continuous Blood Gluc Sensor (FREESTYLE CLARITA 2 SENSOR SYSTM) MISC     furosemide (LASIX) 40 MG tablet     gabapentin  "(NEURONTIN) 300 MG capsule     HYDROcodone-acetaminophen (NORCO) 5-325 MG tablet     insulin aspart (NOVOLOG PEN) 100 UNIT/ML pen     insulin glargine (LANTUS PEN) 100 UNIT/ML pen     insulin pen needle (31G X 6 MM) 31G X 6 MM miscellaneous     isosorbide mononitrate (IMDUR) 30 MG 24 hr tablet     levothyroxine (SYNTHROID, LEVOTHROID) 137 MCG tablet     lisinopril (ZESTRIL) 2.5 MG tablet     nitroGLYcerin (NITROSTAT) 0.4 MG sublingual tablet     ondansetron (ZOFRAN-ODT) 4 MG ODT tab     pantoprazole (PROTONIX) 40 MG EC tablet     No current facility-administered medications for this visit.       ALLERGIES:  Allergies   Allergen Reactions     No Known Allergies        REVIEW OF SYSTEMS:  Constitutional:  No weight loss, fever, chills  HEENT:  Eyes:  No visual loss, blurred vision, double vision or yellow sclerae. No hearing loss, sneezing, congestion, runny nose or sore throat.  Skin:  No rash or itching.  Cardiovascular: per HPI  Respiratory: per HPI  GI:  No anorexia, nausea, vomiting or diarrhea. No abdominal pain or blood.  :  No dysurea, hematuria  Neurologic:  No headache, paralysis, ataxia, numbness or tingling in the extremities. No change in bowel or bladder control.  Musculoskeletal:  No muscle pain  Hematologic:  No bleeding or bruising.  Lymphatics:  No enlarged nodes. No history of splenectomy.  Endocrine:  No reports of sweating, cold or heat intolerance. No polyuria or polydipsia.  Allergies:  No history of asthma, hives, eczema or rhinitis.    PHYSICAL EXAM:  BP 98/64 (BP Location: Right arm, Patient Position: Sitting, Cuff Size: Adult Large)   Pulse 72   Ht 1.93 m (6' 4\")   Wt 116.2 kg (256 lb 3.2 oz)   SpO2 99%   BMI 31.19 kg/m      Constitutional: awake, alert, no distress  Eyes: PERRL, sclera nonicteric  ENT: trachea midline  Respiratory: Lungs clear  Cardiovascular: Regular rate, totally irregular rhythm, no murmurs, trace edema of the lower shins  GI: nondistended, nontender, bowel sounds " present  Lymph/Hematologic: no lymphadenopathy  Skin: dry, no rash  Musculoskeletal: good muscle tone, strength 5/5 in upper and lower extremities  Neurologic: no focal deficits  Neuropsychiatric: appropriate affact    DATA:  Lab: February 2022: Potassium 4.0, creatinine 1.1  Recent Labs   Lab Test 04/08/21  0426 08/13/20  1028 06/30/16  0849 02/02/15  1016   CHOL 79 107   < > 99   HDL 36* 45   < > 43   LDL 33 53   < > 42   TRIG 52 44   < > 68   CHOLHDLRATIO  --   --   --  2.3    < > = values in this interval not displayed.     ASSESSMENT:  71-year-old male seen for ischemic cardiomyopathy.  He is overall quite stable.  He appears euvolemic on exam.  Medications will be continued.  Current dry weight is probably around 245 pounds on his home scale.    RECOMMENDATIONS:  1.  Ischemic cardiomyopathy, NYHA class II symptoms  -Continue current medications  -Continue indefinite clopidogrel due to multiple stents    2.  Chronic A. Fib  -Rates controlled, continue Eliquis    Follow-up in 6 months with FLORA, check lipids.    Livan King MD  Cardiology - [facility]  Pager:  [phone]  Text Page  March 17, 2022    cc:   Gael Christensen NP  [address] [address]

## 2022-04-10 NOTE — PROCEDURES
" SLEEP STUDY INTERPRETATION  DIAGNOSTIC POLYSOMNOGRAPHY REPORT      Patient: PORTER YANCEY  YOB: 1950  Study Date: 4/4/2022  MRN: [id number]  Referring Provider: LORRAINE Deleon CNP, Jackelin  Ordering Provider: Goltz, PA-C, Bennett    Indications for Polysomnography: The patient is a 71-year-old Male who is 6' 4\" and weighs 265.0 lbs. His BMI is 32.3, Greer sleepiness scale 6 and neck circumference is 46 cm. Relevant medical history includes cerebrovascular accident due to embolism of left posterior cerebral artery, ischemic cardiomyopathy, chronic atrial fibrillation, MARISOL, DM type2, HTN. He was previously diagnosed with MARISOL, tried CPAP for almost 1 year and could not tolerate the mask and discontinued the treatment. A diagnostic polysomnogram was performed to evaluate for sleep apnea /hypoxemia.     Polysomnogram Data: A full night polysomnogram recorded the standard physiologic parameters including EEG, EOG, EMG, ECG, nasal and oral airflow. Respiratory parameters of chest and abdominal movements were recorded with respiratory inductance plethysmography. Oxygen saturation was recorded by pulse oximetry. Hypopnea scoring rule used: 1B 4%.    Sleep Architecture: Sleep architecture was remarkable for sleep fragmentation and increased wake after sleep onset with reduced sleep efficiency.  All sleep stages were observed. REM sleep was significantly reduced.  The total recording time of the polysomnogram was 483.4 minutes. The total sleep time was 206.5 minutes. Sleep latency was decreased at 4.4 minutes without the use of a sleep aid. REM latency was 382.0 minutes. Arousal index was increased at 55.5 arousals per hour (the arousals were mostly due to respiratory events and PLMs). Sleep efficiency was decreased at 42.7%. Wake after sleep onset was 272.5 minutes. The patient spent 21.1% of total sleep time in Stage N1, 70.0% in Stage N2, 8.2% in Stage N3, and 0.7% in REM. Time in REM supine was - " minutes.    Respiration: Severe obstructive sleep apnea was present with sleep associated hypoxemia. Obstructive events were pronounced during supine sleep position. Since REM sleep was significantly reduced, it is plausible that the overall severity of the sleep-related breathing disorder may have been underestimated.    Events ? The polysomnogram revealed a presence of 35 obstructive, 3 centrals, and - mixed apneas resulting in an apnea index of 11.0 events per hour. There were 73 obstructive hypopneas and - central hypopneas resulting in an obstructive hypopnea index of 21.2 and central hypopnea index of - events per hour. The combined apnea/hypopnea index was 32.3 events per hour (central apnea/hypopnea index was 0.9 events per hour). The REM AHI was 80.0 events per hour (REM sleep was seen for only 1.5 minutes). The supine AHI was 73.1 events per hour. The RERA index was 6.1 events per hour.  The RDI was 38.4 events per hour.    Snoring - was reported as mild.    Respiratory pattern -There was intermittent periodicity in the breathing pattern, but the respiratory events were predominantly obstructive in nature.      Sustained Sleep Associated Hypoventilation - Transcutaneous carbon dioxide monitoring was not used.    Sleep Associated Hypoxemia - (Greater than 5 minutes O2 sat at or below 88%) was present. Baseline oxygen saturation was 93.0%. Lowest oxygen saturation was 80.0%. Time spent less than or equal to 88% was 9.9 minutes. Time spent less than or equal to 89% was 24.1 minutes.    Movement Activity: Frequent periodic limb movements were observed, some of which were associated with arousals.  There were no abnormal sleep-related behaviors.    Periodic Limb Activity - There were 242 PLMs during the entire study. The PLM index was 70.3 movements per hour. The PLM Arousal Index was 17.7 per hour.    REM EMG Activity - Excessive transient/sustained muscle activity could not be assessed since REM sleep was  significantly reduced.    Nocturnal Behavior - Abnormal sleep related behaviors were not noted.     Bruxism - None apparent.    Cardiac Summary: Atrial fibrillation with controlled rate was observed.  The average pulse rate was 68.3 bpm. The minimum pulse rate was 55.0 bpm while the maximum pulse rate was 87.0 bpm.  Arrhythmias were noted.      Assessment:     Sleep architecture was remarkable for sleep fragmentation and increased wake after sleep onset with reduced sleep efficiency.  All sleep stages were observed. REM sleep was significantly reduced.    Severe obstructive sleep apnea was present with sleep associated hypoxemia. Obstructive events were pronounced during supine sleep position. Since REM sleep was significantly reduced, it is plausible that the overall severity of the sleep-related breathing disorder may have been underestimated.      Frequent periodic limb movements were observed, some of which were associated with arousals.  There were no abnormal sleep-related behaviors.      Atrial fibrillation with controlled rate was observed.    Recommendations:    Recommend repeat polysomnography with full night titration of positive airway pressure therapy for the control of sleep disordered breathing.    Based on the presence of severe obstructive sleep apnea, treatment could be empirically initiated with Auto?titrating CPAP therapy with a narrow range of 5 to 10 cmH2O and plan to convert  the settings from auto titrating mode  to fixed pressure based on 95th percentile pressure per compliance download in 1-2 weeks after starting CPAP therapy. Recommend clinical follow up with sleep management team.    Pharmacologic therapy should be used for management of restless legs syndrome only if present and clinically indicated and not based on the presence of periodic limb movements alone.    Continues follow up with Cardiology    Advice regarding the risks of drowsy driving.    Suggest optimizing sleep schedule  and avoiding sleep deprivation.    Weight management (if BMI > 30).     Diagnostic Codes:   Obstructive Sleep Apnea G47.33  Sleep Hypoxemia/Hypoventilation G47.36   Periodic Limb Movement Disorder G47.61  Repetitive Intrusions Into Sleep F51.8     4/4/2022 [facility] Sleep Study (265.0 lbs) - AHI 32.3, RDI 38.4, Supine AHI 73.1, REM AHI 80.0, Low O2 80.0%, Time Spent ?88% 9.9 minutes / Time Spent ?89% 24.1 minutes.     _____________________________________   Electronically Signed By: (Christine Lerner MD), 4/10/2022

## 2022-04-11 NOTE — TELEPHONE ENCOUNTER
----- Message from Ruth Lerner MD sent at 4/10/2022  5:16 PM CDT -----  Regarding: FYI:PSG report  Garry Delgado,    Please find the sleep study report under the documentation in epic.  I am sending you this message since patient has severe complex medical history and does not have a follow-up appointment with you until  May 17, 2022 . I'd suggest obtaining all-night titration study if the patient is agreeable.  Please let me know if you have any questions.    Demetrio

## 2022-04-11 NOTE — TELEPHONE ENCOUNTER
I reviewed study results with Jayro. He would prefer to start CPAP rather than come in for another study, in part due to financial barriers. Given his apnea was mostly obstructive and his LVEF is too low for ASV, starting a low level of auto CPAP is reasonable.   I have placed and order for auto CPAP 5-10 cm. He was informed that there is a shortage of CPAP machines, so it may take a little longer than usual to get a machine. That said, his history of stroke, cardiomyopathy and A-Fib will move him up on the wait list.  We reviewed usage goals and mask exchange opportunity.  His follow up will be changed from May to 2 months after starting CPAP.  He was given the phone number for [facility] in [place] to call and discuss getting on the wait list for a CPAP.  Bennett Goltz, PA-C

## 2022-04-12 NOTE — PROGRESS NOTES
"Podiatry / Foot and Ankle Surgery Progress Note    April 12, 2022    Subject: Patient was seen for 7 week status post partial left great toe amputation due to osteomyelitis.  Denies fever, nausea, vomiting.  No concerns today     Objective:  Vitals: /84   Ht 1.93 m (6' 4\")   Wt 116.1 kg (256 lb)   BMI 31.16 kg/m    BMI= Body mass index is 31.16 kg/m .    A1C: 8.5 (12/2021)     General:  Patient is alert and orientated.  NAD.     Vascular:  DP and PT pulses are palpable.  edema but no varicosities noted.  CFT's < 3secs.  Skin temp is normal.     Neuro:  Light and gross touch sensation absent to feet.     Derm: Incision is well-healed.  There is no preulcerative lesion to the plantar medial aspect of the first metatarsal head anymore.  No open lesions are noted.     Musculoskeletal:  Partial left great toe amputation.       Assessment:    Post-operative state  Diabetic polyneuropathy associated with type 2 diabetes mellitus (H)  Amputation of left great toe (H)     Medical Decision Making/Plan: At this time, we will have him follow-up as needed.  He will continue to monitor his foot and we discussed that if the callus builds up to the side of the first metatarsal head on the left foot to call to come in and get it debrided.  He is going to continue to lotion his feet and use a pumice stone to keep the callus from building up.  We will have him follow-up as needed.  All questions were answered to patient satisfaction and he will call for the questions or concerns.        Patient Risk Factor:  Patient is a high risk factor for infection.     Татьяна Mckeon DPM, Podiatry/Foot and Ankle Surgery    "

## 2022-04-12 NOTE — LETTER
"    4/12/2022         RE: Jayro Elder  [address] [place] [address]        Dear Colleague,    Thank you for referring your patient, Jayro Elder, to the [facility]. Please see a copy of my visit note below.    Podiatry / Foot and Ankle Surgery Progress Note    April 12, 2022    Subject: Patient was seen for 7 week status post partial left great toe amputation due to osteomyelitis.  Denies fever, nausea, vomiting.  No concerns today     Objective:  Vitals: /84   Ht 1.93 m (6' 4\")   Wt 116.1 kg (256 lb)   BMI 31.16 kg/m    BMI= Body mass index is 31.16 kg/m .    A1C: 8.5 (12/2021)     General:  Patient is alert and orientated.  NAD.     Vascular:  DP and PT pulses are palpable.  edema but no varicosities noted.  CFT's < 3secs.  Skin temp is normal.     Neuro:  Light and gross touch sensation absent to feet.     Derm: Incision is well-healed.  There is no preulcerative lesion to the plantar medial aspect of the first metatarsal head anymore.  No open lesions are noted.     Musculoskeletal:  Partial left great toe amputation.       Assessment:    Post-operative state  Diabetic polyneuropathy associated with type 2 diabetes mellitus (H)  Amputation of left great toe (H)     Medical Decision Making/Plan: At this time, we will have him follow-up as needed.  He will continue to monitor his foot and we discussed that if the callus builds up to the side of the first metatarsal head on the left foot to call to come in and get it debrided.  He is going to continue to lotion his feet and use a pumice stone to keep the callus from building up.  We will have him follow-up as needed.  All questions were answered to patient satisfaction and he will call for the questions or concerns.        Patient Risk Factor:  Patient is a high risk factor for infection.     Татьяна Mckeon DPM, Podiatry/Foot and Ankle Surgery        Again, thank you for allowing me to " participate in the care of your patient.        Sincerely,        Татьяна Mckeon DPM, Podiatry/Foot and Ankle Surgery

## 2022-04-15 NOTE — TELEPHONE ENCOUNTER
Left message for patient to call [facility]'s mainline [phone] to schedule CPAP setup appointment.

## 2022-05-02 NOTE — ED PROVIDER NOTES
"  History     Chief Complaint:  Vision Loss; Headache    HPI   Patient is a somewhat poor historian    Jayro Elder is a 66 year old male anticoagulated on Plavix and Aspirin with a history of CAD, cardiomyopathy s/p stent placement, type 2 diabetes, and hypertension who presents with vision loss and headache. The patient reports onset of vision loss in his right eye and headache on Tuesday, 2 days ago. He drove back from [place] this evening and presented to the ED for evaluation. On arrival to the ED, the patient reports that he has vision loss in the lateral visual field of his right eye vision. He states that he \"sees a red A like from a road sign\" in his vision. The patient also notes a headache on both sides of his head that he rates at 3/10. The patient reports that he feels a little bit dizzy and has been having troubles with his balance, noting that he has to grab onto walls to ambulate. He states his symptoms have not changed since onset on Tuesday, though he is no longer \"seeing stars.\" The patient denies any numbness, weakness, tingling, vomiting, or speech changes. No history of stroke.     Allergies:  No known drug allergies     Medications:    insulin glargine (LANTUS) 100 UNIT/ML injection  clopidogrel (PLAVIX) 75 MG tablet  simvastatin (ZOCOR) 40 MG tablet  metoprolol (TOPROL-XL) 100 MG 24 hr tablet  lisinopril (PRINIVIL,ZESTRIL) 20 MG tablet  amLODIPine (NORVASC) 5 MG tablet  sitagliptan (JANUVIA) 50 MG tablet  pantoprazole (PROTONIX) 40 MG enteric coated tablet  metFORMIN (GLUCOPHAGE) 1000 MG tablet  aspirin 81 MG EC tablet  levothyroxine (SYNTHROID, LEVOTHROID) 137 MCG tablet  nitroglycerin (NITROSTAT) 0.4 MG SL tablet    Past Medical History:    CAD  Cardiomyopathy  Hypertension  Generalized osteoarthritis  MI  Neuropathy  Tobacco use disorder  Type 2 diabetes    Past Surgical History:    Angiogram, stent placement x4  Laminectomy  Bunionectomy  Gingival surgery    Family History:  " "  Colorectal cancer  Thyroid disease  Cardiovascular  Diabetes    Social History:  Smoking status: Former smoker, quit 2005  Alcohol use: Yes, occasional  Presents to the ED with his wife  Marital Status:   [2]     Review of Systems   Constitutional: Negative for fever.   Eyes: Positive for visual disturbance.   Gastrointestinal: Negative for vomiting.   Musculoskeletal: Positive for gait problem.   Neurological: Positive for dizziness and headaches. Negative for facial asymmetry, speech difficulty, weakness and numbness.   Psychiatric/Behavioral: Negative for confusion.   All other systems reviewed and are negative.    Physical Exam   First Vitals:  BP: (!) 157/92  Pulse: 129  Heart Rate: 95  Temp: 97.8  F (36.6  C)  Resp: 18  Height: 193 cm (6' 4\")  Weight: 99.8 kg (220 lb)  SpO2: 98 %      Physical Exam  Nursing note and vitals reviewed.  Constitutional:  Appears well-developed and well-nourished.   HENT:   Head:    Atraumatic.   Mouth/Throat:   Oropharynx is clear and moist. No oropharyngeal exudate.   Eyes:    Pupils are equal, round, and reactive to light.   Neck:    Normal range of motion. Neck supple.      No tracheal deviation present. No thyromegaly present.   Cardiovascular:  Normal rate, regular rhythm, no murmur   Pulmonary/Chest: Breath sounds are clear and equal without wheezes or crackles.  Abdominal:   Soft. Bowel sounds are normal. Exhibits no distension and      no mass. There is no tenderness.      There is no rebound and no guarding.   Musculoskeletal:  Exhibits no edema.   Lymphadenopathy:  No cervical adenopathy.   Neurological:   Alert and oriented to person, place, and time. GCS 15.  He has a visual field defect of the lateral visual field of the right eye.  Possible extremely subtle right facial palsy, only noted by subtle asymmetrical facial movement when he speaks.   and proximal upper extremity strength strong and equal.  Bilateral lower extremity strength strong and equal, " including strong dorsiflexion and plantarflexion strength.  Sensation intact and equal to the face, arms and legs. Normal speech.  Follows commands and answers questions normally.    Skin:    Skin is warm and dry. No rash noted. No pallor.     Emergency Department Course   ECG:  Indication: Vision loss  Time: 2214  Vent. Rate 99 bpm. OR interval *. QRS duration 130. QT/QTc 394/505. P-R-T axis * -28 -41. Atrial fibrillation, right bundle branch block, anterior infarct, age undetermined, abnormal ECG Read time: 2218    Imaging:      MR Brain w/o & w Contrast   1. Moderately sized acute/subacute infarction left temporal occipital region with hemorraghic transformation. No significant global mass effect.   2. 4 mm acute/early subacute infarction left parietal lobe.   3. Mild atrophy  4. Moderate chronic small vessel ischemia.   Preliminary radiology read.      MR Head w/o Contrast Angiogram  Normal intracranial circulation  Preliminary radiology read.      MR Neck w/o & w Contrast Angiogram   1. No hemodynamically significant narrowing throughout major neck vessels.   2. Probable thyroid nodules, not well visualized; ultrasound recommended if not done previously.   Preliminary radiology read.      Radiographic findings were communicated with the patient who voiced understanding of the findings.      Laboratory:  (0345) Glucose by meter: 199 (H)    CBC:  WBC 4.9, HGB 14.6, , otherwise WNL      BMP: Glucose 345 (H), otherwise WNL (Creatinine 0.90)     (2200) Troponin I: <0.015      Interventions:  (2221) Normal Saline, 1 liter, IV bolus    (0033) Gadavist, 10 mL, IV   (0256) Cardene, 2.5 mg/hr, IV  (0314) Cardene dose change to 5 mg/hr, IV  (0328) Cardene dose change to 7.5 mg/hr IV    Emergency Department Course:  Nursing notes and vitals reviewed. I performed an exam of the patient as documented above.   EKG was done, interpretation as above.   A peripheral IV was established.   Blood was drawn from the patient.  This was sent for laboratory testing, findings above.    The patient was sent for a MR Head w/o Contrast Angiogram, MR Brain w/o & w Contrast, MR Neck w/o & w Contrast Angiogram  while in the emergency department, findings above.   2217: Spoke to Dr. Berry of neurology.   I spoke with Denisa, on call for neuro surgery.   Findings and plan explained to the patient who consents to admission.   I discussed the patient with Dr. Arriaga of the hospitalist service, who will admit the patient for further monitoring, evaluation, and treatment.    Impression & Plan      CMS Diagnoses: The patient has stroke symptoms:           ED Stroke specific documentation           NIHSS PDF          Protocol PDF       Medical Decision Making:  I found him to have an acute/subacute infarction with hemorrhagic transformation with no significant global mass effect and another small stroke as well. Therefore he was admitted to the intensive care unit under the primary care of the hospitalist, Dr. Arriaga. I consulted neuro critical care Dr. Berry as well as regular neurology Dr. Perez and the patient was placed on a nicardipine drip to keep his systolic blood pressure less than 140 and the plan would be that his anticoagulants be stopped. The patient's symptoms started Tuesday and have been stable. Since there was no mass effect he did not need any anticoagulation reversal other than stopping the aspirin and Plavix at this. Neuro surgery was consulted, I spoke with Denisa, who was on call for neuro surgery to make them aware incase his hemorrhage worsens.     This critical care note was 66.0 minutes spent on the management of this patient       Diagnosis:    ICD-10-CM    1. Hemorrhagic stroke (H) I61.9 Glucose by meter   2. New onset atrial fibrillation (H) I48.91      Disposition:  Admit to ICU    Scot DELA CRUZ, am serving as a scribe on 4/21/2017 at 4:43 AM to personally document services performed by Dr. Blanco based on my observations and  the provider's statements to me.     4/20/2017    EMERGENCY DEPARTMENT    I, Eli Saez, am serving as a scribe at 10:03 PM on 4/20/2017 to document services personally performed by Elaine Blanco MD based on my observations and the provider's statements to me.        Elaine Blanco MD  04/21/17 0732       Elaine Blanco MD  04/21/17 0732       Elaine Blanco MD  04/21/17 0733     n/a

## 2022-05-03 NOTE — PROGRESS NOTES
[facility]  INITIAL VISIT NOTE  May 4, 2022    PRIMARY CARE PROVIDER AND CLINIC:  Davion Cordova [address] / [place] [address]    Chief Complaint   Patient presents with     Hospital F/U       HPI:    Jayro Elder is a 71 year old  (1950) male who was seen at [facility] in [place] on May 4, 2022 for an initial visit.     Medical history is notable for DM type II, peripheral neuropathy, diabetic foot ulcer, osteomyelitis, CAD, ischemic cardiomyopathy, severe pulmonary hypertension, permanent atrial fibrillation, essential hypertension, dyslipidemia, CVA, CKD stage II, chronic anemia, hypothyroidism, GERD, osteoarthritis, untreated MARISOL, COVID-19 infection, and recent partial left great toe amputation on February 14, 2022 for osteomyelitis.    Summary of hospital course:  Patient was hospitalized at [facility] from April 22 through May 2, 2022. He originally presented with fall and head laceration and found to have acute hypoxic respiratory failure and septic shock due to multifocal streptococcal pneumonia.  In the ED, he was incontinent, lethargic, and bleeding from right temple.  Right temple wound was closed with staples.  He was placed on 6 L of oxygen.  Screening for COVID-19, influenza A/B, and RSV was negative. EKG showed atrial fibrillation with a heart rate of 97 bpm and RBBB.  Serial troponin I levels were slightly elevated suggestive of demand ischemia.  CT angio showed left more than right multifocal pneumonia and nodular opacities up to 7 mm.  He was started on ceftriaxone and doxycycline.  He had an unresponsive episode on the evening of admission required intubation and treatment with pressors.  He also received few doses of metronidazole, vancomycin, and cefepime.  He was extubated on April 24.  He completed 7-day course of ceftriaxone on April 28.  Hospital course was complicated by acute kidney injury due to ATN.  Creatinine was 1.1 at admission  which went up to 3.7.  Renal function eventually improved.    Patient had an ongoing vertigo for several months.  Brain MRI was obtained and it revealed small acute/subacute infarct in the left temporal occipital white matter.  CT angio head and neck was negative for significant intracranial stenosis or high-grade stenosis of the neck vessels.  He continued with PTA anticoagulation with apixaban.  Neurology recommended follow-up in 1-2 months.    While inpatient, his diabetic regimen was adjusted and he was discharged on Lantus and sliding scale insulin.    Patient is admitted to this facility for medical management, nursing care, and rehab.     Of note, history was obtained from patient, facility RN, and extensive review of the chart.    Today's visit:  Patient was seen in his room, while sitting in a wheelchair.  He appears comfortable.  He is off supplemental oxygen and is saturating around 98% on room air.  He has occasional cough productive of clear sputum.  He denies fever, chills, chest pain, palpitation, dyspnea, nausea, vomiting, abdominal pain, or urinary symptoms.  He reports that he had a bowel movement last night.    CODE STATUS:   DNR / DNI    PAST MEDICAL HISTORY:   Bilateral multifocal pneumonia (due to Streptococcus pneumoniae) with acute hypoxic respiratory failure and septic shock in April 2022  Left temporal occipital CVA in April 2022  CVA in 2016 with residual right eye visual field impairment  Vertigo  DM type II  Diabetic peripheral polyneuropathy  Left first, second and third toe amputation for osteomyelitis  Right second toe, third, and fourth partial amputation for osteomyelitis  CAD with history of PCI x10 including SUSAN to mid LAD in June 2005, stent to LAD in November 2013 for restenosis, SUSAN to OM 3 in June 2017, and SUSAN to RCA in July 2019  Ischemic cardiomyopathy/chronic HFrEF, last LVEF 25% on April 23, down from LVEF of 41% on December 28, 2021  Moderately decreased RV systolic  function, per echo in December 2021  Severe pulmonary hypertension  Permanent atrial fibrillation  RBBB  Essential hypertension  Dyslipidemia  CKD stage II, baseline creatinine around 1-1.2  Chronic anemia, baseline hemoglobin 10-11  Hypothyroidism  GERD  Colonic diverticulosis  Hepatic steatosis  Gallbladder stone/cholelithiasis  Splenomegaly  Left thyroid nodule, measuring 1.7 cm, noted incidentally on CT cervical spine on April 22, 2022  Right renal cortical hypodense lesion, measuring 1.3 cm, noted incidentally on CT chest on April 22, 2022   Oral cancer at age 9  Osteoarthritis  MARISOL; does not use CPAP  COVID-19 infection in January 2022    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     Cardiomyopathy (H)      Cellulitis of left lower extremity 3/13/2018     Cerebral artery occlusion with cerebral infarction (H)      x 2 and 2 smaller ones. Only residule: vision impaired.     Cerebral infarction (H)     eye sight decreased in peripheral of right side and blurry     Cerebral infarction (H) 2016     Congestive heart failure (H)      Diabetic ulcer of left midfoot associated with type 2 diabetes mellitus, with fat layer exposed (H) 3/13/2018     Essential hypertension 11/13/2002     Essential hypertension, benign 11/13/2002     Gastroesophageal reflux disease      Generalized osteoarthrosis, unspecified site 11/13/2002     Lightheadedness 12/27/2021     Microalbuminuria 3/13/2018    X1     Myocardial infarction (H)      Myocardial infarction (H) 06/28/2005     Neuropathy      Neuropathy 2016     Sepsis (H)      Sleep apnea     doesn't use it     Sleep apnea 2006     Stented coronary artery     x 10 stents     Substance abuse (H)      Substance abuse (H)      Syncope, unspecified syncope type 3/13/2018     Syncope, unspecified syncope  type 03/13/2018     Thyroid disease     takes medicine     Thyroid disease 2005     Tobacco use disorder 11/13/2002     Type 2 diabetes mellitus (H) 2000     Type 2 diabetes mellitus with diabetic peripheral angiopathy without gangrene, unspecified long term insulin use status 2005       PAST SURGICAL HISTORY:   Past Surgical History:   Procedure Laterality Date     AMPUTATE TOE(S) Right 1/6/2019    Procedure: Right open second toe partial amputation;  Surgeon: Sabino Garcia DPM;  Location: [facility] OR     AMPUTATE TOE(S) Right 1/8/2019    Procedure: 1.  Revision right second toe amputation with resection of distal half of proximal phalanx with full closure.    2.  Debridement of callus/preulcerative lesion, distal left second toe and plantar left first metatarsophalangeal joint.;  Surgeon: Ryan Bhagat DPM;  Location: [facility] OR     AMPUTATE TOE(S) Right 3/12/2019    Procedure: Partial right fourth toe amputation.;  Surgeon: Ryan Bhagat DPM;  Location: [facility] OR     AMPUTATE TOE(S) Right 5/6/2019    Procedure: Right partial third toe amputation;  Surgeon: Татьяна Mckeon DPM, Podiatry/Foot and Ankle Surgery;  Location: [facility] OR     AMPUTATE TOE(S) Left 4/18/2020    Procedure: LEFT SECOND TOE AMPUTATION;  Surgeon: Ryan Bhagat DPM;  Location: [facility] OR     AMPUTATE TOE(S) Left 5/8/2021    Procedure: 1.  Amputation of the left third toe at the level of the proximal interphalangeal joint. 2.  Excisional debridement of less than 20 square cm down to the level of the deeper skin, plantar left foot.;  Surgeon: Kwasi Root DPM;  Location: [facility] OR     AMPUTATE TOE(S) Right 2019    4 toes amputation      AMPUTATE TOE(S) Left 2/14/2022    Procedure: Partial left great toe amputation due to osteomyelitis at the interphalangeal joint;  Surgeon: Татьяна Mckeon DPM, Podiatry/Foot and Ankle Surgery;  Location: [facility] OR     ANGIOGRAM  6/29/05    subtotal occ.mid LAD,SUSAN mid LAD     ANGIOGRAM  7/05    mild CAD,patent  stent,no flow-limiting lesions,sev.LV dysf.LAD enlarged     ANGIOGRAM  2/09    Sev.single vessel disease,Mod LV dysf.distal LAD 90%,70-75% mid lad just before prev stent,SUSAN to prox.mid LAD, endeavor to distal LAD     ANGIOGRAM  11/13/13    restenosis, stent LAD     BACK SURGERY      back surgery x3     CARDIAC CATHETERIZATION  07/08/2019     CARDIAC CATHETERIZATION Left 06/13/2017     CARDIAC CATHETERIZATION  2005,2009,20013     CORONARY STENT PLACEMENT  07/08/2019     CV CORONARY ANGIOGRAM N/A 7/8/2019    Procedure: Coronary Angiogram;  Surgeon: Jose Alfredo Crockett MD;  Location: [facility] CARDIAC CATH LAB     CV CORONARY ANGIOGRAM N/A 4/9/2021    Procedure: CV CORONARY ANGIOGRAM;  Surgeon: Warren Paulson MD;  Location: [facility] CARDIAC CATH LAB     CV HEART CATHETERIZATION WITH POSSIBLE INTERVENTION N/A 12/28/2021    Procedure: Coronary Angiogram;  Surgeon: Jose Alfredo Crockett MD;  Location:  HEART CARDIAC CATH LAB     CV INSTANTANEOUS WAVE-FREE RATIO N/A 12/28/2021    Procedure: Instantaneous Wave-Free Ratio;  Surgeon: Jose Alfredo Crockett MD;  Location:  HEART CARDIAC CATH LAB     CV LEFT HEART CATH N/A 7/8/2019    Procedure: Left Heart Cath;  Surgeon: Jose Alfredo Crockett MD;  Location:  HEART CARDIAC CATH LAB     CV LEFT HEART CATH N/A 12/28/2021    Procedure: Left Heart Cath;  Surgeon: Jose Alfredo Crockett MD;  Location:  HEART CARDIAC CATH LAB     CV LEFT VENTRICULOGRAM N/A 12/28/2021    Procedure: Left Ventriculogram;  Surgeon: Jose Alfredo Crockett MD;  Location:  HEART CARDIAC CATH LAB     CV PCI ASPIRATION THROMECTOMY N/A 7/8/2019    Procedure: PCI Aspiration Thrombectomy;  Surgeon: Jose Alfredo Crockett MD;  Location:  HEART CARDIAC CATH LAB     CV PCI STENT DRUG ELUTING N/A 7/8/2019    Procedure: PCI Stent Drug Eluting;  Surgeon: Jose Alfredo Crockett MD;  Location:  HEART CARDIAC CATH LAB     HEART CATH LEFT HEART CATH  06/13/2017    SUSAN to OM3     INCISION AND DRAINAGE TOE, COMBINED Bilateral  2018    Procedure: COMBINED INCISION AND DRAINAGE TOE;  1) Irrigation and debridement ulcer left great toe  2) Amputation 3rd toe right foot at distal interphalangeal joint level;  Surgeon: Miki Harp MD;  Location: [facility] OR     IRRIGATION AND DEBRIDEMENT FOOT, COMBINED Left 3/12/2019    Procedure: Debridement of left foot ulcer sub first MPJ 2 x 2.5 cm down to and including subcutaneous tissue, callus debridement for preulcerative lesion, left second toe.;  Surgeon: Ryan Bhagat DPM;  Location:  OR     ORTHOPEDIC SURGERY      bunion surgery both feet     ORTHOPEDIC SURGERY      left shoulder     OTHER SURGICAL HISTORY  9 years old    cancer tumor mouth     SHOULDER ARTHROSCOPY W/ ROTATOR CUFF REPAIR Left      SOFT TISSUE SURGERY      debridement of toe numerous time     VASCULAR SURGERY      7 stents in heart     VASCULAR SURGERY       [facility] NONSPECIFIC PROCEDURE      Laminectomy x 3 - (1983 x 2 & )     [facility] NONSPECIFIC PROCEDURE Bilateral 1998    Bunionectomy     [facility] NONSPECIFIC PROCEDURE  1959    Gingival surgery at age 9       FAMILY HISTORY:   Family History   Problem Relation Age of Onset     Cancer - colorectal Sister      Thyroid Disease Brother      Cardiovascular Brother      Diabetes Brother      Cancer Father         tumor in chest and throat     Arthritis Mother      Thyroid Disease Mother      Diabetes Mother      Glaucoma No family hx of      Macular Degeneration No family hx of        SOCIAL HISTORY:  Social History     Tobacco Use     Smoking status: Former Smoker     Packs/day: 1.00     Years: 25.00     Pack years: 25.00     Types: Cigarettes     Start date:      Quit date: 2005     Years since quittin.7     Smokeless tobacco: Never Used   Substance Use Topics     Alcohol use: Yes     Alcohol/week: 4.0 standard drinks     Types: 4 Shots of liquor per week     Comment: 3x/week: Glass of Scotch.       MEDICATIONS:  Current Outpatient Medications    Medication Sig Dispense Refill     apixaban ANTICOAGULANT (ELIQUIS) 5 MG tablet Take 1 tablet (5 mg) by mouth 2 times daily       atorvastatin (LIPITOR) 40 MG tablet Take 1 tablet (40 mg) by mouth every evening 90 tablet 3     carvedilol (COREG) 25 MG tablet Take 1 tablet (25 mg) by mouth 2 times daily (with meals) 180 tablet 3     clopidogrel (PLAVIX) 75 MG tablet Take 1 tablet (75 mg) by mouth daily 90 tablet 3     Continuous Blood Gluc  (FREESTYLE CLARITA 2 READER SYSTM) DRAGAN 1 Device daily 1 each 3     Continuous Blood Gluc Sensor (FREESTYLE CLARITA 2 SENSOR SYSTM) MISC 1 Units 4 times daily (before meals and nightly) 1 each 3     furosemide (LASIX) 40 MG tablet Take 1 tablet (40 mg) by mouth daily 90 tablet 3     gabapentin (NEURONTIN) 300 MG capsule TAKE 2-3 CAPSULES (600-900 MG) BY MOUTH AT BEDTIME 180 capsule 1     HYDROcodone-acetaminophen (NORCO) 5-325 MG tablet Take 1-2 tablets by mouth every 4 hours as needed for moderate to severe pain 15 tablet 0     insulin aspart (NOVOLOG PEN) 100 UNIT/ML pen Inject 12 Units Subcutaneous 2 times daily (with meals) With breakfast and lunch (Patient taking differently: Inject 16 Units Subcutaneous in the morning and 16 Units at noon and 16 Units in the evening. Inject with meals. 16 at Breakfast, 18 at Dinner/Lunch and 16 at Supper.)       insulin glargine (LANTUS PEN) 100 UNIT/ML pen Inject 44 units subcutaneously once every morning       insulin pen needle (31G X 6 MM) 31G X 6 MM miscellaneous Use  as directed. 100 each 11     isosorbide mononitrate (IMDUR) 30 MG 24 hr tablet Take 1 tablet (30 mg) by mouth daily 90 tablet 1     levothyroxine (SYNTHROID, LEVOTHROID) 137 MCG tablet Take 1 tablet by mouth once every evening 90 tablet 3     lisinopril (ZESTRIL) 2.5 MG tablet Take 1 tablet (2.5 mg) by mouth daily 90 tablet 3     nitroGLYcerin (NITROSTAT) 0.4 MG sublingual tablet For chest pain place 1 tablet under the tongue every 5 minutes for 3 doses. If symptoms  "persist 5 minutes after 1st dose call 911. 15 tablet 3     ondansetron (ZOFRAN-ODT) 4 MG ODT tab Take 1-2 tablets (4-8 mg) by mouth every 8 hours as needed for nausea 4 tablet 0     pantoprazole (PROTONIX) 40 MG EC tablet TAKE 1 TABLET BY MOUTH EVERY MORNING BEFORE BREAKFAST 90 tablet 1       Post Discharge Medication Reconciliation Status: discharge medications reconciled and changed, per note/orders.        ALLERGIES:  Allergies   Allergen Reactions     No Known Allergies        ROS:  10 point ROS were negative other than the symptoms noted above in the HPI.    PHYSICAL EXAM:  Vital signs were reviewed in the chart.  Vital Signs: BP (!) 146/88   Pulse 78   Temp 98.6  F (37  C)   Resp 16   Ht 1.93 m (6' 4\")   Wt 116.1 kg (256 lb)   SpO2 95%   BMI 31.16 kg/m    General: Comfortable and in no acute distress  HEENT: Conjunctival pallor  Cardiovascular: Normal S1, S2, irregularly irregular rhythm  Respiratory: Lungs clear to auscultation bilaterally  GI: Abdomen soft, non-tender, non-distended, +BS  Extremities: 2-3+ bilateral LE edema  Neuro: CX II-XII grossly intact; ROM in all four extremities grossly intact; no focal weakness on gross examination  Psych: Alert and oriented x3; normal affect  Skin: No acute rash    LABORATORY/IMAGING DATA:  All relevant labs and imaging data were reviewed personally today.    Chemistry profile (May 1, 2022): Sodium 135, potassium 2.9, BUN 14, creatinine 0.99, calcium 8.7.    Hematology profile (April 28, 2022): White count 6.4, hemoglobin 9.9, MCV 87.4, platelet count 172,000    Most Recent 3 CBC's:Recent Labs   Lab Test 01/07/22  0652 01/06/22  0759 01/05/22  0631   WBC 3.7* 4.8 5.3   HGB 10.0* 10.0* 10.3*   MCV 88 91 89    242 243     Most Recent 3 BMP's:Recent Labs   Lab Test 02/14/22  1350 02/14/22  1143 02/11/22  1010 01/07/22  0839 01/07/22  0652 01/06/22  1303 01/06/22  0759   NA  --   --  137  --  135  --  137   POTASSIUM  --   --  4.0  --  3.9  --  3.8 "   CHLORIDE  --   --  106  --  103  --  104   CO2  --   --  26  --  26  --  28   BUN  --   --  17  --  18  --  21   CR  --   --  1.10  --  1.32*  --  1.27*   ANIONGAP  --   --  5  --  6  --  5   BETTIE  --   --  8.5  --  9.3  --  9.3   * 157* 196*   < > 160*   < > 189*    < > = values in this interval not displayed.     Most Recent 2 LFT's:Recent Labs   Lab Test 01/05/22  0631 01/04/22  1541   AST 16 13   ALT 25 30   ALKPHOS 105 122   BILITOTAL 1.0 0.9     Most Recent TSH and T4:Recent Labs   Lab Test 04/08/21  0426   TSH 2.45     Most Recent Hemoglobin A1c:Recent Labs   Lab Test 12/28/21  0710   A1C 8.5*     Most Recent 6 glucoses:Recent Labs   Lab Test 02/14/22  1350 02/14/22  1143 02/11/22  1010 01/07/22  1725 01/07/22  1208 01/07/22  0839   * 157* 196* 138* 140* 140*         ASSESSMENT/PLAN:  Bilateral multifocal pneumonia (due to Streptococcus pneumoniae) with septic shock and acute hypoxic respiratory failure,  Pulmonary nodules (up to 7 mm).  Patient required intubation and pressors in the hospital.  He completed 7-day course of antibiotic therapy on April 28.  He is currently off supplemental oxygen and saturating normal on room air.  Plan:  Monitor respiratory status  Follow-up CT in 3 to 6-month as outpatient    Shock liver.  Due to above.  Liver function improved with the last AST of 35 and ALT of 302 on May 29.  Plan:  Repeat LFT on May 9    ABNER,  CKD stage II.  Baseline creatinine around 1-1.2.  ABNER due to ATN with peak creatinine of 3.78 on April 25.  Improved.  Last creatinine was 0.99 on May 1.  Plan:  Avoid NSAIDs and nephrotoxic  Recheck BMP on May 9    Acute/subacute left temporal occipital CVA,  Vertigo,  History of CVA in 2016 with residual right eye visual field impairment.  No focal weakness on gross examination.  Plan:  Continue Plavix 75 mg daily, apixaban 5 mg p.o. twice daily, and atorvastatin 40 mg p.o. daily  Continue meclizine 25 mg p.o. 3 times daily as needed for  vertigo  Follow-up with neurology as recommended    DM type II,  Diabetic peripheral polyneuropathy.  Last Hgb A1c was 8.5% in December 2021.  PTA on NovoLog 16 units subcu at bedtime, 18 units subcu at lunch, 16 units subcu with supper, and insulin glargine 44 units subcu daily.  In the hospital, his  insulin regimen changed to Lantus 15 uunits subcu daily and sliding scale insulin lispro.  Blood glucose levels are in the range of 112-265.  Plan:  Diabetic diet  Increase Lantus to 17 units subcu in am  Continue sliding scale insulin lispro  Continue gabapentin 300 mg p.o. daily  Monitor blood glucose before meals and at bedtime  Further adjust insulin regimen PRN    History of diabetic foot ulcer with osteomyelitis,   S/P left first, second and third toe amputation for osteomyelitis,  S/P right second toe, third, and fourth partial amputation for osteomyelitis.  Plan:  Management of diabetes as above    CAD with history of PCI X10 including SUSAN to mid LAD in June 2005, stent to LAD in November 2013 for restenosis, SUSAN to OM 3 in June 2017, and SUSAN to RCA in July 2019,  Demand ischemia,  Essential hypertension,  Dyslipidemia.  Blood pressure is fairly controlled.  He is currently stable from a cardiac standpoint.  Plan:  Continue Plavix 75 mg p.o. daily, atorvastatin 40 mg p.o. daily, lisinopril 10 mg p.o. daily, carvedilol 6.25 mg p.o. twice daily, Imdur 30 mg p.o. daily, and furosemide 20 mg p.o. daily  Nitroglycerin sublingual available PRN for angina  Monitor blood pressure and cardiac status  Follow-up with cardiology as directed    Ischemic cardiomyopathy (chronic HFrEF),  Moderately decreased RV systolic function,  Severe pulmonary hypertension.  Last LVEF of 25% on April 23, down from LVEF of 41% on December 28, 2021  Patient currently weighs 264 LBS and has 2-3+ bilateral lower extremity edema..  Plan:  Continue furosemide 20 mg p.o. daily for now but may need to increase furosemide dose  Continue  lisinopril 10 mg daily, carvedilol 6.25 mg p.o. twice daily, and Imdur 30 mg p.o. daily  Tubigrips to lower extremities  Monitor weight and volume status  Follow-up with cardiology as directed    Permanent atrial fibrillation.  Rate is controlled.  Plan:  Continue carvedilol 6.25 mg p.o. twice daily  Continue chronic anticoagulation with apixaban 5 mg p.o. twice daily  Monitor heart rate    Chronic anemia.  Baseline hemoglobin 10-11.  Last hemoglobin was 9.9 on April 28.  Plan:  Recheck CBC on May 9    Hypothyroidism.  Last TSH was 0.59 on April 22, 2022.  Plan:  Continue PTA levothyroxine 137 mcg p.o. daily   Follow-up as outpatient    GERD.  Plan:  Continue PTA pantoprazole 40 mg p.o. daily    MARISOL.  Has not used his CPAP for the past 5 to 6 years due to intolerance.  He is scheduled to get a new CPAP machine.  Plan:  Monitor O2 sats    Insomnia.  Plan:  Melatonin 5 mg p.o. nightly PRN    Physical deconditioning.  Plan:  Continue PT/OT evaluation and therapy      INCIDENTAL FINDINGS:  Gallbladder stone,  Cholelithiasis,  Hepatic steatosis,  Splenomegaly.  Noted on abdominal ultrasound on April 23, 2022.  Plan:  Follow-up as outpatient    Left thyroid nodule.  Measuring 1.7 cm, noted on CT cervical spine on May 22, 2022.  Plan:  Follow-up as outpatient    Right renal cortical hypodense lesion.  Measuring 1.3 cm, noted on CT chest on April 22, 2022.  Plan:  Follow-up as outpatient          Orders written by provider at facility:  Diabetic diet  CBC on May 9, DX: Anemia  BMP on May 9, DX: CKD  LFT on May 9, DX: Shock liver  Increase Lantus to 17 units subcu daily, hold for blood glucose less than 120, DX: DM type II  Melatonin 5 mg p.o. nightly as needed for insomnia  Tubigrips to lower extremities      Recommendations by provider at facility:  May need to increase furosemide dose based on his volume status      Disclaimer: This note may contain text created using speech-recognition software and may contain unintended  word substitutions.    Electronically signed by:  Sunday Gaines MD

## 2022-05-10 NOTE — PROGRESS NOTES
[facility] DISCHARGE SUMMARY  PATIENT'S NAME: Jayro Elder  YOB: 1950  MEDICAL RECORD NUMBER:  [id number]  Place of Service where encounter took place: [facility] in [place]      PRIMARY CARE PROVIDER AND CLINIC RESPONSIBLE AFTER TRANSFER:   Davion Cordova PA-C, [id number] / [place] [address]    Transferring providers: Sunday Gaines MD,   Recent Hospitalization/ED: [facility] from April 22 through May 2, 2022  Date of SNF Admission: May 2, 2022  Date of SNF (anticipated) Discharge: May 14, 2022  Discharged to: Previous independent home  Cognitive Scores: SLUMS: 24/30  Physical Function: Ambulating with a walker  DME: Wheelchair    CODE STATUS/ADVANCE DIRECTIVES DISCUSSION:  Full Code     ALLERGIES: No known allergies    DISCHARGE DIAGNOSIS/NURSING FACILITY COURSE:   Jayro Elder is a 71 year old  (1950) male who was seen at [facility] in [place] on May 11, 2022.     Medical history is notable for DM type II, peripheral neuropathy, diabetic foot ulcer, osteomyelitis, CAD, ischemic cardiomyopathy, severe pulmonary hypertension, permanent atrial fibrillation, essential hypertension, dyslipidemia, CVA, CKD stage II, chronic anemia, hypothyroidism, GERD, osteoarthritis, untreated MARISOL, COVID-19 infection, and recent partial left great toe amputation on February 14, 2022 for osteomyelitis.     Summary of hospital course:  Patient was hospitalized at [facility] from April 22 through May 2, 2022. He originally presented with fall and head laceration and found to have acute hypoxic respiratory failure and septic shock due to multifocal streptococcal pneumonia.  In the ED, he was incontinent, lethargic, and bleeding from right temple.  Right temple wound was closed with staples.  He was placed on 6 L of oxygen.  Screening for COVID-19, influenza A/B, and RSV was negative. EKG showed atrial fibrillation with a heart rate of 97 bpm and  RBBB.  Serial troponin I levels were slightly elevated suggestive of demand ischemia.  CT angio showed left more than right multifocal pneumonia and nodular opacities up to 7 mm.  He was started on ceftriaxone and doxycycline.  He had an unresponsive episode on the evening of admission required intubation and treatment with pressors.  He also received few doses of metronidazole, vancomycin, and cefepime.  He was extubated on April 24.  He completed 7-day course of ceftriaxone on April 28.  Hospital course was complicated by acute kidney injury due to ATN.  Creatinine was 1.1 at admission which went up to 3.7.  Renal function eventually improved.     Patient had an ongoing vertigo for several months.  Brain MRI was obtained and it revealed small acute/subacute infarct in the left temporal occipital white matter.  CT angio head and neck was negative for significant intracranial stenosis or high-grade stenosis of the neck vessels.  He continued with PTA anticoagulation with apixaban.  Neurology recommended follow-up in 1-2 months.     While inpatient, his diabetic regimen was adjusted and he was discharged on Lantus and sliding scale insulin.     Patient was admitted to this facility for medical management, nursing care, and rehab.     Today's visit:  Patient was seen in his room, while sitting in a wheelchair.  He appears comfortable.  He is able to ambulate in the gatica without feeling short of breath.  He is off supplemental oxygen.  He reports that he had a bowel movement last night.  He denies fever, chills, chest pain, palpitation, nausea, vomiting, abdominal pain, or urinary symptoms.      Summary of TCU course:  Bilateral multifocal pneumonia (due to Streptococcus pneumoniae) with septic shock and acute hypoxic respiratory failure,  Pulmonary nodules (up to 7 mm).  Patient required intubation and pressors in the hospital.  He completed 7-day course of antibiotic therapy on April 28.  Now off supplemental oxygen  and saturating normal on room air.  Plan:  Follow-up CT in 3 to 6-month      Shock liver.  Due to above.  LFT on May 9 with ALT of 81, HDL 34, total protein of 1.9, and alkaline phosphatase of 131.  Plan:  Recheck CMP in 1 week     ABNER, resolved.  CKD stage II.  Baseline creatinine around 1-1.2.  ABNER due to ATN with peak creatinine of 3.78 on April 25.  Unremarkable renal sonogram without any calculi, masses, or lesions, per renal ultrasound on May 6, 2022.  Last creatinine was stable at 1.04 on May 9.  Plan:  Avoid NSAIDs and nephrotoxic  Monitor renal function periodically     Acute/subacute left temporal occipital CVA,  Vertigo,  History of CVA in 2016 with residual right eye visual field impairment.  No focal weakness on gross examination.  Plan:  Continue Plavix 75 mg p.o. daily, apixaban 5 mg p.o. twice daily, and atorvastatin 40 mg p.o. daily  Continue meclizine 25 mg p.o. 3 times daily as needed for vertigo  Follow-up with neurology as outpatient     DM type II,  Diabetic peripheral polyneuropathy.  Last Hgb A1c was 8.5% in December 2021.  PTA on NovoLog 16 units subcu at bedtime, 18 units subcu at lunch, 16 units subcu with supper, and insulin glargine 44 units subcu daily.  In the hospital, his insulin regimen changed to Lantus 15 units subcu daily and sliding scale insulin lispro.  In TCU Lantus dose increased to 18 units subcu in a.m.  Plan:  Diabetic diet  Continue Lantus 18 units subcu in am  DC sliding scale insulin at discharge  Continue gabapentin 300 mg p.o. daily  Monitor blood glucose before meals and at bedtime  Further adjust insulin regimen PRN     History of diabetic foot ulcer with osteomyelitis,   S/P left first, second and third toe amputation for osteomyelitis,  S/P right second toe, third, and fourth partial amputation for osteomyelitis.  Plan:  Management of diabetes as above     CAD with history of PCI X10 including SUSAN to mid LAD in June 2005, stent to LAD in November 2013 for  restenosis, SUSAN to OM 3 in June 2017, and SUSAN to RCA in July 2019,  Demand ischemia,  Essential hypertension,  Dyslipidemia.  Blood pressure is fairly controlled.  He is stable from a cardiac standpoint.  Plan:  Continue Plavix 75 mg p.o. daily, atorvastatin 40 mg p.o. daily, lisinopril 10 mg p.o. daily, carvedilol 6.25 mg p.o. twice daily, Imdur 30 mg p.o. daily, and furosemide 20 mg p.o. daily  Nitroglycerin sublingual available PRN for angina  Monitor blood pressure and cardiac status  Follow-up with cardiology as outpatient     Ischemic cardiomyopathy (chronic HFrEF),  Moderately decreased RV systolic function,  Severe pulmonary hypertension,  Bilateral LE lymphedema.  Last LVEF of 25% on April 23, down from LVEF of 41% on December 28, 2021  Patient currently weighs 255.4 LBS and has 3-4+ bilateral lower extremity edema.  Plan:  No added salt  Increase furosemide to 20 mg p.o. twice daily*  Continue lisinopril 10 mg daily, carvedilol 6.25 mg p.o. twice daily, and Imdur 30 mg p.o. daily  Bilateral TEDs  Lymphedema therapy  Monitor weight and volume status  Follow-up with cardiology as directed     Permanent atrial fibrillation.  Rate is controlled.  Plan:  Continue carvedilol 6.25 mg p.o. twice daily  Continue chronic anticoagulation with apixaban 5 mg p.o. twice daily  Monitor heart rate     Chronic anemia.  Baseline hemoglobin 10-11.  Last hemoglobin was stable at 10.4 on May 9.  Plan:  Monitor hemoglobin periodically     Hypothyroidism.  Last TSH was 0.59 on April 22, 2022.  Plan:  Continue PTA levothyroxine 137 mcg p.o. daily   Follow-up as outpatient     GERD.  Plan:  Continue PTA pantoprazole 40 mg p.o. daily     MARISOL.  Not been compliant with CPAP for the past 5 to 6 years due to intolerance.  He is scheduled to get a new CPAP machine.  Plan:  Monitor O2 sats     Insomnia.  Plan:  Melatonin 5 mg p.o. nightly PRN     Physical deconditioning.  Plan:  Continue PT/OT evaluation and therapy        INCIDENTAL  FINDINGS:  Gallbladder stone,  Cholelithiasis,  Hepatic steatosis,  Splenomegaly.  Noted on abdominal ultrasound on April 23, 2022.  Plan:  Follow-up as outpatient     Bilateral thyroid nodules.  At least 4 right-sided (largest measuring 15 x 14 x 19 mm) and 2 left-sided thyroid nodules (largest measuring 18 x 30 x 25 mm), per thyroid ultrasound on May 6, 2022.  Plan:  Follow-up as outpatient     Right renal cortical hypodense lesion.  Measuring 1.3 cm, noted on CT chest on April 22, 2022, but unremarkable renal sonogram on May 6 with no masses or calculi.         Discharge Medications:  Current Outpatient Medications   Medication Sig Dispense Refill     apixaban ANTICOAGULANT (ELIQUIS) 5 MG tablet Take 1 tablet (5 mg) by mouth 2 times daily       atorvastatin (LIPITOR) 40 MG tablet Take 1 tablet (40 mg) by mouth every evening 90 tablet 3     carvedilol (COREG) 6.25 MG tablet Take 6.25 mg by mouth 2 times daily (with meals)       clopidogrel (PLAVIX) 75 MG tablet Take 1 tablet (75 mg) by mouth daily 90 tablet 3     Continuous Blood Gluc  (FREESTYLE CLARITA 2 READER SYSTM) DRAGAN 1 Device daily 1 each 3     Continuous Blood Gluc Sensor (FREESTYLE CLARITA 2 SENSOR SYSTM) MISC 1 Units 4 times daily (before meals and nightly) 1 each 3     furosemide (LASIX) 20 MG tablet Take 20 mg by mouth daily       gabapentin (NEURONTIN) 300 MG capsule Take 300 mg by mouth daily       insulin glargine (LANTUS PEN) 100 UNIT/ML pen 17 Units every morning Hold for blood glucose less than 120       insulin lispro (HUMALOG KWIKPEN) 100 UNIT/ML (1 unit dial) KWIKPEN Inject Subcutaneous 4 times daily (before meals and nightly) amt: Per Sliding Scale;  If Blood Sugar is 151 to 170, give 2 Units.  If Blood Sugar is 171 to 200, give 4 Units.  If Blood Sugar is 201 to 220, give 8 Units.  If Blood Sugar is 221 to 250, give 10 Units.  If Blood Sugar is 251 to 300, give 12 Units.  If Blood Sugar is greater than 300, call  "MD.  subcutaneous  Special Instructions: blood sugar less than 71 treat for hypoglycemia and notify MD blood sugar  give 0 units       insulin pen needle (31G X 6 MM) 31G X 6 MM miscellaneous Use  as directed. 100 each 11     isosorbide mononitrate (IMDUR) 30 MG 24 hr tablet Take 1 tablet (30 mg) by mouth daily 90 tablet 1     levothyroxine (SYNTHROID, LEVOTHROID) 137 MCG tablet Take 1 tablet by mouth once every evening 90 tablet 3     lisinopril (ZESTRIL) 10 MG tablet Take 10 mg by mouth daily       meclizine (ANTIVERT) 25 MG tablet Take 25 mg by mouth 3 times daily as needed for dizziness       melatonin 5 MG tablet Take 5 mg by mouth nightly as needed for sleep       nitroGLYcerin (NITROSTAT) 0.4 MG sublingual tablet For chest pain place 1 tablet under the tongue every 5 minutes for 3 doses. If symptoms persist 5 minutes after 1st dose call 911. 15 tablet 3     pantoprazole (PROTONIX) 40 MG EC tablet TAKE 1 TABLET BY MOUTH EVERY MORNING BEFORE BREAKFAST 90 tablet 1     Post Discharge Medication Reconciliation Status: discharge medications reconciled and changed, per note/orders.      Controlled medications sent with patient:   not applicable/none     ROS:   10 point ROS of systems including Constitutional, Eyes, Respiratory, Cardiovascular, Gastroenterology, Genitourinary, Integumentary, Musculoskeletal, Psychiatric were all negative except for pertinent positives noted in my today's visit note.    PHYSICAL EXAM:  Vital signs were reviewed in the chart.  Vital Signs: /74   Pulse 71   Temp 98  F (36.7  C)   Resp 16   Ht 1.905 m (6' 3\")   Wt 115.8 kg (255 lb 6.4 oz)   SpO2 100%   BMI 31.92 kg/m    General: Comfortable and in no acute distress  HEENT: Conjunctival pallor  Cardiovascular: Normal S1, S2, irregularly irregular rhythm  Respiratory: Lungs clear to auscultation bilaterally  GI: Abdomen soft, non-tender, non-distended, +BS  Extremities: B/L 3-4+ LE edema  Neuro: CX II-XII grossly intact; " ROM in all four extremities grossly intact  Psych: Alert and oriented x3; normal affect  Skin: No acute rash    SNF labs:   Most Recent 3 CBC's:Recent Labs   Lab Test 05/09/22  0556 01/07/22  0652 01/06/22  0759   WBC 3.5* 3.7* 4.8   HGB 10.4* 10.0* 10.0*   MCV 91 88 91    224 242     Most Recent 3 BMP's:Recent Labs   Lab Test 05/09/22  0556 02/14/22  1350 02/14/22  1143 02/11/22  1010 01/07/22  0839 01/07/22  0652     --   --  137  --  135   POTASSIUM 3.9  --   --  4.0  --  3.9   CHLORIDE 101  --   --  106  --  103   CO2 26  --   --  26  --  26   BUN 16  --   --  17  --  18   CR 1.04  --   --  1.10  --  1.32*   ANIONGAP 12  --   --  5  --  6   BETTIE 9.8  --   --  8.5  --  9.3   GLC 87 142* 157* 196*   < > 160*    < > = values in this interval not displayed.     Most Recent 2 LFT's:Recent Labs   Lab Test 05/09/22  0556 01/05/22  0631   AST 34 16   ALT 81* 25   ALKPHOS 131* 105   BILITOTAL 1.9* 1.0     Most Recent TSH and T4:Recent Labs   Lab Test 04/08/21  0426   TSH 2.45     Most Recent Hemoglobin A1c:Recent Labs   Lab Test 12/28/21  0710   A1C 8.5*       DISCHARGE PLAN:    Follow up labs: CMP 1 week    Medical Follow Up:   Follow-up with PCP in 1 week      [facility] scheduled appointments: Sleep medicine on May 17, 2022       Discharge Services: PT/OT/RN      TOTAL DISCHARGE TIME:   Greater than 30 minutes  Electronically signed by:  Sunday Gaines MD     Documentation of Face to Face and Certification for Home Health Services    I certify that patient: Jayro Elder is under my care and that I, or a nurse practitioner or physician's assistant working with me, had a face-to-face encounter that meets the physician face-to-face encounter requirements with this patient on: 5/11/2022.    This encounter with the patient was in whole, or in part, for the following medical condition, which is the primary reason for home health care: Physical deconditioning and assist with actives of daily living  and lab draw.    I certify that, based on my findings, the following services are medically necessary home health services: Nursing, Occupational Therapy and Physical Therapy.    My clinical findings support the need for the above services because: Nurse is needed: To assess edema and volume status and assist witj lab draw after changes in medications or other medical regimen.., Occupational Therapy Services are needed to assess and treat cognitive ability and address ADL safety due to impairment in strength as well as lymphedema evaluation and treatment. and Physical Therapy Services are needed to assess and treat the following functional impairments: Physical deconditioning.    Further, I certify that my clinical findings support that this patient is homebound (i.e. absences from home require considerable and taxing effort and are for medical reasons or [employer] services or infrequently or of short duration when for other reasons) because: Requires assistance of another person or specialized equipment to access medical services because patient: Requires supervision of another for safe transfer...    Based on the above findings. I certify that this patient is confined to the home and needs intermittent skilled nursing care, physical therapy and/or speech therapy.  The patient is under my care, and I have initiated the establishment of the plan of care.  This patient will be followed by a physician who will periodically review the plan of care.  Physician/Provider to provide follow up care: Davion Cordova    Attending hospital physician (the Medicare certified [facility] provider): Sunday Gaines MD  Physician Signature: See electronic signature associated with these discharge orders.  Date: 5/11/2022        Disclaimer: This note may contain text created using speech-recognition software and may contain unintended word substitutions.

## 2022-05-11 NOTE — LETTER
5/11/2022        RE: Jayro Elder  [address] [place] [address]        [facility] DISCHARGE SUMMARY  PATIENT'S NAME: Jayro Elder  YOB: 1950  MEDICAL RECORD NUMBER:  [id number]  Place of Service where encounter took place: [facility] in [place]      PRIMARY CARE PROVIDER AND CLINIC RESPONSIBLE AFTER TRANSFER:   Davion Cordova PA-C, [address] [place] [address]    Transferring providers: Sunday Gaines MD,   Recent Hospitalization/ED: [facility] from April 22 through May 2, 2022  Date of SNF Admission: May 2, 2022  Date of SNF (anticipated) Discharge: May 14, 2022  Discharged to: Previous independent home  Cognitive Scores: SLUMS: 24/30  Physical Function: Ambulating with a walker  DME: Wheelchair    CODE STATUS/ADVANCE DIRECTIVES DISCUSSION:  Full Code     ALLERGIES: No known allergies    DISCHARGE DIAGNOSIS/NURSING FACILITY COURSE:   Jayro Elder is a 71 year old  (1950) male who was seen at [facility] in [place] on May 11, 2022.     Medical history is notable for DM type II, peripheral neuropathy, diabetic foot ulcer, osteomyelitis, CAD, ischemic cardiomyopathy, severe pulmonary hypertension, permanent atrial fibrillation, essential hypertension, dyslipidemia, CVA, CKD stage II, chronic anemia, hypothyroidism, GERD, osteoarthritis, untreated MARISOL, COVID-19 infection, and recent partial left great toe amputation on February 14, 2022 for osteomyelitis.     Summary of hospital course:  Patient was hospitalized at [facility] from April 22 through May 2, 2022. He originally presented with fall and head laceration and found to have acute hypoxic respiratory failure and septic shock due to multifocal streptococcal pneumonia.  In the ED, he was incontinent, lethargic, and bleeding from right temple.  Right temple wound was closed with staples.  He was placed on 6 L of oxygen.  Screening for COVID-19,  influenza A/B, and RSV was negative. EKG showed atrial fibrillation with a heart rate of 97 bpm and RBBB.  Serial troponin I levels were slightly elevated suggestive of demand ischemia.  CT angio showed left more than right multifocal pneumonia and nodular opacities up to 7 mm.  He was started on ceftriaxone and doxycycline.  He had an unresponsive episode on the evening of admission required intubation and treatment with pressors.  He also received few doses of metronidazole, vancomycin, and cefepime.  He was extubated on April 24.  He completed 7-day course of ceftriaxone on April 28.  Hospital course was complicated by acute kidney injury due to ATN.  Creatinine was 1.1 at admission which went up to 3.7.  Renal function eventually improved.     Patient had an ongoing vertigo for several months.  Brain MRI was obtained and it revealed small acute/subacute infarct in the left temporal occipital white matter.  CT angio head and neck was negative for significant intracranial stenosis or high-grade stenosis of the neck vessels.  He continued with PTA anticoagulation with apixaban.  Neurology recommended follow-up in 1-2 months.     While inpatient, his diabetic regimen was adjusted and he was discharged on Lantus and sliding scale insulin.     Patient was admitted to this facility for medical management, nursing care, and rehab.     Today's visit:  Patient was seen in his room, while sitting in a wheelchair.  He appears comfortable.  He is able to ambulate in the gatica without feeling short of breath.  He is off supplemental oxygen.  He reports that he had a bowel movement last night.  He denies fever, chills, chest pain, palpitation, nausea, vomiting, abdominal pain, or urinary symptoms.      Summary of TCU course:  Bilateral multifocal pneumonia (due to Streptococcus pneumoniae) with septic shock and acute hypoxic respiratory failure,  Pulmonary nodules (up to 7 mm).  Patient required intubation and pressors in the  hospital.  He completed 7-day course of antibiotic therapy on April 28.  Now off supplemental oxygen and saturating normal on room air.  Plan:  Follow-up CT in 3 to 6-month      Shock liver.  Due to above.  LFT on May 9 with ALT of 81, HDL 34, total protein of 1.9, and alkaline phosphatase of 131.  Plan:  Recheck CMP in 1 week     ABNER, resolved.  CKD stage II.  Baseline creatinine around 1-1.2.  ABNER due to ATN with peak creatinine of 3.78 on April 25.  Unremarkable renal sonogram without any calculi, masses, or lesions, per renal ultrasound on May 6, 2022.  Last creatinine was stable at 1.04 on May 9.  Plan:  Avoid NSAIDs and nephrotoxic  Monitor renal function periodically     Acute/subacute left temporal occipital CVA,  Vertigo,  History of CVA in 2016 with residual right eye visual field impairment.  No focal weakness on gross examination.  Plan:  Continue Plavix 75 mg p.o. daily, apixaban 5 mg p.o. twice daily, and atorvastatin 40 mg p.o. daily  Continue meclizine 25 mg p.o. 3 times daily as needed for vertigo  Follow-up with neurology as outpatient     DM type II,  Diabetic peripheral polyneuropathy.  Last Hgb A1c was 8.5% in December 2021.  PTA on NovoLog 16 units subcu at bedtime, 18 units subcu at lunch, 16 units subcu with supper, and insulin glargine 44 units subcu daily.  In the hospital, his insulin regimen changed to Lantus 15 units subcu daily and sliding scale insulin lispro.  In TCU Lantus dose increased to 18 units subcu in a.m.  Plan:  Diabetic diet  Continue Lantus 18 units subcu in am  DC sliding scale insulin at discharge  Continue gabapentin 300 mg p.o. daily  Monitor blood glucose before meals and at bedtime  Further adjust insulin regimen PRN     History of diabetic foot ulcer with osteomyelitis,   S/P left first, second and third toe amputation for osteomyelitis,  S/P right second toe, third, and fourth partial amputation for osteomyelitis.  Plan:  Management of diabetes as above     CAD  with history of PCI X10 including SUSAN to mid LAD in June 2005, stent to LAD in November 2013 for restenosis, SUSAN to OM 3 in June 2017, and SUSAN to RCA in July 2019,  Demand ischemia,  Essential hypertension,  Dyslipidemia.  Blood pressure is fairly controlled.  He is stable from a cardiac standpoint.  Plan:  Continue Plavix 75 mg p.o. daily, atorvastatin 40 mg p.o. daily, lisinopril 10 mg p.o. daily, carvedilol 6.25 mg p.o. twice daily, Imdur 30 mg p.o. daily, and furosemide 20 mg p.o. daily  Nitroglycerin sublingual available PRN for angina  Monitor blood pressure and cardiac status  Follow-up with cardiology as outpatient     Ischemic cardiomyopathy (chronic HFrEF),  Moderately decreased RV systolic function,  Severe pulmonary hypertension,  Bilateral LE lymphedema.  Last LVEF of 25% on April 23, down from LVEF of 41% on December 28, 2021  Patient currently weighs 255.4 LBS and has 3-4+ bilateral lower extremity edema.  Plan:  No added salt  Increase furosemide to 20 mg p.o. twice daily*  Continue lisinopril 10 mg daily, carvedilol 6.25 mg p.o. twice daily, and Imdur 30 mg p.o. daily  Bilateral TEDs  Lymphedema therapy  Monitor weight and volume status  Follow-up with cardiology as directed     Permanent atrial fibrillation.  Rate is controlled.  Plan:  Continue carvedilol 6.25 mg p.o. twice daily  Continue chronic anticoagulation with apixaban 5 mg p.o. twice daily  Monitor heart rate     Chronic anemia.  Baseline hemoglobin 10-11.  Last hemoglobin was stable at 10.4 on May 9.  Plan:  Monitor hemoglobin periodically     Hypothyroidism.  Last TSH was 0.59 on April 22, 2022.  Plan:  Continue PTA levothyroxine 137 mcg p.o. daily   Follow-up as outpatient     GERD.  Plan:  Continue PTA pantoprazole 40 mg p.o. daily     MARISOL.  Not been compliant with CPAP for the past 5 to 6 years due to intolerance.  He is scheduled to get a new CPAP machine.  Plan:  Monitor O2 sats     Insomnia.  Plan:  Melatonin 5 mg p.o. nightly  PRN     Physical deconditioning.  Plan:  Continue PT/OT evaluation and therapy        INCIDENTAL FINDINGS:  Gallbladder stone,  Cholelithiasis,  Hepatic steatosis,  Splenomegaly.  Noted on abdominal ultrasound on April 23, 2022.  Plan:  Follow-up as outpatient     Bilateral thyroid nodules.  At least 4 right-sided (largest measuring 15 x 14 x 19 mm) and 2 left-sided thyroid nodules (largest measuring 18 x 30 x 25 mm), per thyroid ultrasound on May 6, 2022.  Plan:  Follow-up as outpatient     Right renal cortical hypodense lesion.  Measuring 1.3 cm, noted on CT chest on April 22, 2022, but unremarkable renal sonogram on May 6 with no masses or calculi.         Discharge Medications:  Current Outpatient Medications   Medication Sig Dispense Refill     apixaban ANTICOAGULANT (ELIQUIS) 5 MG tablet Take 1 tablet (5 mg) by mouth 2 times daily       atorvastatin (LIPITOR) 40 MG tablet Take 1 tablet (40 mg) by mouth every evening 90 tablet 3     carvedilol (COREG) 6.25 MG tablet Take 6.25 mg by mouth 2 times daily (with meals)       clopidogrel (PLAVIX) 75 MG tablet Take 1 tablet (75 mg) by mouth daily 90 tablet 3     Continuous Blood Gluc  (FREESTYLE CLARITA 2 READER SYSTM) DRAGAN 1 Device daily 1 each 3     Continuous Blood Gluc Sensor (FREESTYLE CLARITA 2 SENSOR SYSTM) MISC 1 Units 4 times daily (before meals and nightly) 1 each 3     furosemide (LASIX) 20 MG tablet Take 20 mg by mouth daily       gabapentin (NEURONTIN) 300 MG capsule Take 300 mg by mouth daily       insulin glargine (LANTUS PEN) 100 UNIT/ML pen 17 Units every morning Hold for blood glucose less than 120       insulin lispro (HUMALOG KWIKPEN) 100 UNIT/ML (1 unit dial) KWIKPEN Inject Subcutaneous 4 times daily (before meals and nightly) amt: Per Sliding Scale;  If Blood Sugar is 151 to 170, give 2 Units.  If Blood Sugar is 171 to 200, give 4 Units.  If Blood Sugar is 201 to 220, give 8 Units.  If Blood Sugar is 221 to 250, give 10 Units.  If Blood Sugar  "is 251 to 300, give 12 Units.  If Blood Sugar is greater than 300, call MD.  subcutaneous  Special Instructions: blood sugar less than 71 treat for hypoglycemia and notify MD blood sugar  give 0 units       insulin pen needle (31G X 6 MM) 31G X 6 MM miscellaneous Use  as directed. 100 each 11     isosorbide mononitrate (IMDUR) 30 MG 24 hr tablet Take 1 tablet (30 mg) by mouth daily 90 tablet 1     levothyroxine (SYNTHROID, LEVOTHROID) 137 MCG tablet Take 1 tablet by mouth once every evening 90 tablet 3     lisinopril (ZESTRIL) 10 MG tablet Take 10 mg by mouth daily       meclizine (ANTIVERT) 25 MG tablet Take 25 mg by mouth 3 times daily as needed for dizziness       melatonin 5 MG tablet Take 5 mg by mouth nightly as needed for sleep       nitroGLYcerin (NITROSTAT) 0.4 MG sublingual tablet For chest pain place 1 tablet under the tongue every 5 minutes for 3 doses. If symptoms persist 5 minutes after 1st dose call 911. 15 tablet 3     pantoprazole (PROTONIX) 40 MG EC tablet TAKE 1 TABLET BY MOUTH EVERY MORNING BEFORE BREAKFAST 90 tablet 1     Post Discharge Medication Reconciliation Status: discharge medications reconciled and changed, per note/orders.      Controlled medications sent with patient:   not applicable/none     ROS:   10 point ROS of systems including Constitutional, Eyes, Respiratory, Cardiovascular, Gastroenterology, Genitourinary, Integumentary, Musculoskeletal, Psychiatric were all negative except for pertinent positives noted in my today's visit note.    PHYSICAL EXAM:  Vital signs were reviewed in the chart.  Vital Signs: /74   Pulse 71   Temp 98  F (36.7  C)   Resp 16   Ht 1.905 m (6' 3\")   Wt 115.8 kg (255 lb 6.4 oz)   SpO2 100%   BMI 31.92 kg/m    General: Comfortable and in no acute distress  HEENT: Conjunctival pallor  Cardiovascular: Normal S1, S2, irregularly irregular rhythm  Respiratory: Lungs clear to auscultation bilaterally  GI: Abdomen soft, non-tender, " non-distended, +BS  Extremities: B/L 3-4+ LE edema  Neuro: CX II-XII grossly intact; ROM in all four extremities grossly intact  Psych: Alert and oriented x3; normal affect  Skin: No acute rash    SNF labs:   Most Recent 3 CBC's:Recent Labs   Lab Test 05/09/22  0556 01/07/22  0652 01/06/22  0759   WBC 3.5* 3.7* 4.8   HGB 10.4* 10.0* 10.0*   MCV 91 88 91    224 242     Most Recent 3 BMP's:Recent Labs   Lab Test 05/09/22  0556 02/14/22  1350 02/14/22  1143 02/11/22  1010 01/07/22  0839 01/07/22  0652     --   --  137  --  135   POTASSIUM 3.9  --   --  4.0  --  3.9   CHLORIDE 101  --   --  106  --  103   CO2 26  --   --  26  --  26   BUN 16  --   --  17  --  18   CR 1.04  --   --  1.10  --  1.32*   ANIONGAP 12  --   --  5  --  6   BETTIE 9.8  --   --  8.5  --  9.3   GLC 87 142* 157* 196*   < > 160*    < > = values in this interval not displayed.     Most Recent 2 LFT's:Recent Labs   Lab Test 05/09/22  0556 01/05/22  0631   AST 34 16   ALT 81* 25   ALKPHOS 131* 105   BILITOTAL 1.9* 1.0     Most Recent TSH and T4:Recent Labs   Lab Test 04/08/21  0426   TSH 2.45     Most Recent Hemoglobin A1c:Recent Labs   Lab Test 12/28/21  0710   A1C 8.5*       DISCHARGE PLAN:    Follow up labs: CMP 1 week    Medical Follow Up:   Follow-up with PCP in 1 week      [facility] scheduled appointments: Sleep medicine on May 17, 2022       Discharge Services: PT/OT/RN      TOTAL DISCHARGE TIME:   Greater than 30 minutes  Electronically signed by:  Sunday Gaines MD     Documentation of Face to Face and Certification for Home Health Services    I certify that patient: Jayro Elder is under my care and that I, or a nurse practitioner or physician's assistant working with me, had a face-to-face encounter that meets the physician face-to-face encounter requirements with this patient on: 5/11/2022.    This encounter with the patient was in whole, or in part, for the following medical condition, which is the primary reason  for home health care: Physical deconditioning and assist with actives of daily living and lab draw.    I certify that, based on my findings, the following services are medically necessary home health services: Nursing, Occupational Therapy and Physical Therapy.    My clinical findings support the need for the above services because: Nurse is needed: To assess edema and volume status and assist witj lab draw after changes in medications or other medical regimen.., Occupational Therapy Services are needed to assess and treat cognitive ability and address ADL safety due to impairment in strength. and Physical Therapy Services are needed to assess and treat the following functional impairments: Physical deconditioning.    Further, I certify that my clinical findings support that this patient is homebound (i.e. absences from home require considerable and taxing effort and are for medical reasons or Church services or infrequently or of short duration when for other reasons) because: Requires assistance of another person or specialized equipment to access medical services because patient: Requires supervision of another for safe transfer...    Based on the above findings. I certify that this patient is confined to the home and needs intermittent skilled nursing care, physical therapy and/or speech therapy.  The patient is under my care, and I have initiated the establishment of the plan of care.  This patient will be followed by a physician who will periodically review the plan of care.  Physician/Provider to provide follow up care: Davion Cordova    Attending hospital physician (the Medicare certified [facility] provider): Sunday Gaines MD  Physician Signature: See electronic signature associated with these discharge orders.  Date: 5/11/2022        Disclaimer: This note may contain text created using speech-recognition software and may contain unintended word  substitutions.            Sincerely,        Sunday Gaines MD

## 2022-05-16 NOTE — TELEPHONE ENCOUNTER
Chelle calling from [facility] requesting verbal order for approval in delay in start of care. New start of care to be Wednesday 5/18/22. Call back #[phone]. Verbal approval given by this RN to [facility] RN, Meredith.     Henri AGOSTO, RN

## 2022-05-18 NOTE — TELEPHONE ENCOUNTER
Can take provider approved slot not same day slot or can slide in with anyone in the [facility] with opening for follow up. Ok with verbal for the physical therapy/OT

## 2022-05-18 NOTE — TELEPHONE ENCOUNTER
Nabila [phone] from [facility] requesting:    P T 4 visits in 6 weeks.  OT eval and tx.  Nursing eval and tx.    Do not see start of care from pcp, so did not give verbal.    Patient needs follow up from TCU discharge 5/11.    Nabila states patient frustrated as patient was unable to get appointment when needed.    Attempted to call patient and would use same day. Did not answer.    If ok for TC to schedule same day slot, please also route response to them to contact patient for TCU follow up.    Melissa Hewitt RN

## 2022-05-19 NOTE — TELEPHONE ENCOUNTER
Gave verbal ok to requested home care orders to [facility].    LÁZARO for patient to call and schedule appointment.    Melissa Hewitt RN

## 2022-05-25 NOTE — TELEPHONE ENCOUNTER
Dr Ham at [facility].    Call to  [facility] to adv of different PCP.     Mary Jo FLORES RN

## 2022-06-01 NOTE — PROGRESS NOTES
DATE OF SERVICE:  06/01/2022     PREOPERATIVE DIAGNOSES:  1.  A 45-year-old, para 2 female with menorrhagia with regular cycles.  2.  Negative endometrial biopsy.     POSTOPERATIVE DIAGNOSES:  1.  A 45-year-old, para 2 female with menorrhagia with regular cycles.  2.  Negative endometrial biopsy.     PROCEDURE:  Diagnostic hysteroscopy with endometrial radiofrequency ablation.     SURGEON:  Yumiko Vasquez MD     ANESTHESIOLOGIST:  Adalid Murillo MD     TYPE OF ANESTHESIA:  General.     SPECIMENS:  None.     ESTIMATED BLOOD LOSS:  10 mL     FINDINGS:  Normal uterine cavity.  Cavity length was 6 cm, cavity width was   4.4 cm.  Procedure took a total of 51 seconds.  Normal saline deficit was 65   mL.     DESCRIPTION OF PROCEDURE:  After informed consent was obtained, the patient   was taken to the operating room where she was given general anesthesia and   intubated.  She was then placed in lithotomy position with well-padded Amanuel   stirrups.  She was sterilely prepped.  Her bladder was emptied.  She was   sterilely draped.  Speculum was placed into the vagina.  Cervix was visualized   and anterior lip was grasped with single tooth tenaculum.  Paracervical block   was performed with 9 mL of 0.25% Marcaine with epinephrine at both 10 and 2   o'clock.  Uterine sound was found to be 9 cm.  Cervix was dilated and a   diagnostic hysteroscope was placed with good visualization of the endometrial   cavity which appeared normal.  Cervical length was then identified as 3 cm.    Hysteroscope was removed and NovaSure ablation device was then placed through   the cervical os into the uterus and after being activated cavity width was   found to be 4.4 cm.  These numbers were placed into the NovaSure device.   Cavity assessment was performed and the procedure was carried out in 51   seconds.  Deficit for normal saline was found to be 65 mL. The NovaSure was   then removed.  Tenaculum was removed and there was some bleeding  Gave pt instructions per Gill: if BP runs low and dizzy; hold lisinopril. If dizziness continues to hold the coreg as well.   Princess Chapa RN 4:03 PM 06/12/20     from   tenaculum site, which was treated with silver nitrate.  Speculum was removed   and the patient was taken to recovery room in stable condition.        ______________________________  MD LUPE Thomas/EDITH    DD:  06/01/2022 13:39  DT:  06/01/2022 16:42    Job#:  [id number]

## 2022-06-09 NOTE — TELEPHONE ENCOUNTER
Calling for MTM appointment post hospital discharge 1 month ago.    Patient reports he does not want to have MTM visit at this time.     Recommend provider visit.    Mel Olivo, PharmD [facility]  Medication Therapy Management Practitioner   #[phone]     Patient presents to Urgent care with complaints of bumps on arms, chest, stomach, and neck.  Bumps are red and itch. Onset: 4 days ago.      Denies known Latex allergy or symptoms of Latex sensitivity.    Social History     Tobacco Use   Smoking Status Passive Smoke Exposure - Never Smoker   Smokeless Tobacco Not on file       Medications reviewed and updated.    Patient would like communication of results via:   [] [facility]   [] Home Phone: [phone] (home)   [] Work Phone:  There is no work phone number on file.    [x] Cell Phone:    [phone] (mobile)        Emergency Contact: Kirstin Torre    [] Home:         [] Work:         [] Mobile:   [phone]         Emergency Contact: Mike Torre Sr.    [] Home:   [id number]    [] Work:         [] Mobile:  [phone]    [] Letter  Okay to leave message containing results? Yes

## 2022-06-16 NOTE — TELEPHONE ENCOUNTER
Called patient multiple times to schedule a CPAP setup and patient stated he was not ready and disconnected the call. Waiting for patient to call back to schedule.

## 2022-06-24 NOTE — TELEPHONE ENCOUNTER
[facility] Cardiology Refill Guideline reviewed.  Medication meets criteria for refill.    Received refill request for:  atorvastatin  Last OV was: 3/17/22  Labs/EK  F/U scheduled: Follow up ordered for 10/2023 with labs  New script sent to: [facility]       Bethany MICHAEL RN      .

## 2022-07-11 NOTE — TELEPHONE ENCOUNTER
[facility] Call Center    Phone Message    May a detailed message be left on voicemail: no     Reason for Call: Other: Jayro called to request a new dosage on Rx Lisinopril of 2.5 mg 1 tab by mouth daily as the 10 mg tabs are too strong. He would also like the dosage on Rx Carvedilol changed to 25 mg 2 tabs by mouth daily. Please reach out to Jayro with questions, he can be reached at [phone].    Action Taken: Other:  Cardiology    Travel Screening: Not Applicable

## 2022-09-26 NOTE — TELEPHONE ENCOUNTER
Patient no longer under provider care. Per chart review is now seeing Dr. Ham at [facility].     Danitza GOLDBERG, RN, BSN, PHN  [facility]

## 2022-11-28 NOTE — PROGRESS NOTES
[facility] GERIATRICS    PRIMARY CARE PROVIDER AND CLINIC:  Physician No Ref-Primary, No address on file  Chief Complaint   Patient presents with     Hospital F/U      [place] Medical Record Number:  [id number]  Place of Service where encounter took place:  [facility] ([facility]) [[id number]]    Jayro Elder  is a 72 year old  (1950), admitted to the above facility from  [facility] . Hospital stay 11/6/22 through 11/26/22..   HPI:    Jayro Elder is a 72-year-old male with a past medical history of type 2 diabetes, atrial fibrillation on Eliquis, HFrEF, CAD on Plavix, chronic kidney disease and chronic back pain who was recently hospitalized at [facility] for acute on chronic back pain.  Has chronic back pain and presented with 2 weeks of worsening back pain.  MRI was obtained which showed degenerative disc disease, moderate to severe foraminal stenosis at L5-S1, though early discitis/osteomyelitis could not be excluded at T1.  Thoracic MRI showed a 2 x 2.5 x 3 point 5 subcutaneous fluid collection at T6/T7 concerning for abscess versus hematoma.  Neurosurgery was consulted who initially did not recommend surgery.  He was trialed on p.o. prednisone without improvement.  Due to possible osteomyelitis he was started on vancomycin and ceftriaxone briefly but stopped after infectious disease consultation.  Ultimately underwent CT-guided biopsy and cultures and pathology were negative.  He underwent bilateral L5-S1 transforaminal PREETI with improvement in his pain.  Additionally general surgery was consulted due to subcutaneous fluid collection.  He underwent I&D of sebaceous cyst.  He was incidentally found to be COVID-positive and received 3 days of remdesivir.  He was seen by therapies who recommended discharge to [facility].    Today Jayro is seen in his room.  He is overall doing well.  Has not required p.o. Dilaudid since admission to TCU.  Rates pain at 3-4.  Notes that it is tolerable.   Had significant improvement after steroid injection.  Has chronic edema.  Denies orthopnea or PND.  Has a history of CHF and follows with regions.  States dry weight is approximately 245-250 pounds.  He lives with his wife, son and 2 grandkids in a multilevel home.  Has a stair lift.  Typically uses a cane for ambulation    Review of nursing home EMR: -144, heart rate 67-75, blood glucose 159-392.  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 Only residule: vision impaired.     Cerebral infarction (H)     eye sight decreased in peripheral of right side and blurry     Cerebral infarction (H) 2016     Congestive heart failure (H)      Diabetic ulcer of left midfoot associated with type 2 diabetes mellitus, with fat layer exposed (H) 3/13/2018     Essential hypertension 11/13/2002     Essential hypertension, benign 11/13/2002     Gastroesophageal reflux disease      Generalized osteoarthrosis, unspecified site 11/13/2002     Lightheadedness 12/27/2021     Microalbuminuria 3/13/2018    X1     Myocardial infarction (H)      Myocardial infarction (H) 06/28/2005     Neuropathy      Neuropathy 2016     Sepsis (H)      Sleep apnea     doesn't use it     Sleep apnea 2006     Stented coronary artery     x 10 stents     Substance abuse (H)      Substance abuse (H)      Syncope, unspecified syncope type 3/13/2018     Syncope, unspecified syncope type 03/13/2018     Thyroid disease      takes medicine     Thyroid disease 2005     Tobacco use disorder 11/13/2002     Type 2 diabetes mellitus (H) 2000     Type 2 diabetes mellitus with diabetic peripheral angiopathy without gangrene, unspecified long term insulin use status 2005      PAST SURGICAL HISTORY:   has a past surgical history that includes NONSPECIFIC PROCEDURE; NONSPECIFIC PROCEDURE (Bilateral, 1998); NONSPECIFIC PROCEDURE (1959); angiogram (6/29/05); angiogram (7/05); angiogram (2/09); angiogram (11/13/13); Heart Cath Left heart cath (06/13/2017); back surgery; orthopedic surgery; orthopedic surgery; Soft tissue surgery; vascular surgery; Incision and drainage toe, combined (Bilateral, 9/11/2018); Amputate toe(s) (Right, 1/6/2019); Amputate toe(s) (Right, 1/8/2019); Amputate toe(s) (Right, 3/12/2019); Irrigation and debridement foot, combined (Left, 3/12/2019); Amputate toe(s) (Right, 5/6/2019); Coronary Angiogram (N/A, 7/8/2019); Percutaneous Coronary Intervention Stent (N/A, 7/8/2019); Percutaneous Coronary Intervention Aspiration Thrombectomy (N/A, 7/8/2019); Left Heart Catheterization (N/A, 7/8/2019); Amputate toe(s) (Left, 4/18/2020); Coronary Angiogram (N/A, 4/9/2021); Amputate toe(s) (Left, 5/8/2021); Cardiac catheterization (07/08/2019); Coronary Stent Placement (07/08/2019); Cardiac catheterization (Left, 06/13/2017); Cardiac catheterization (2005,2009,20013); vascular surgery; Amputate toe(s) (Right, 2019); other surgical history (9 years old); Shoulder Arthroscopy W/ Rotator Cuff Repair (Left, 2004); Heart Catheterization with Possible Intervention (N/A, 12/28/2021); Instantaneous Wave-Free Ratio (N/A, 12/28/2021); Left Ventriculogram (N/A, 12/28/2021); Left Heart Catheterization (N/A, 12/28/2021); and Amputate toe(s) (Left, 2/14/2022).  FAMILY HISTORY: family history includes Arthritis in his mother; Cancer in his father; Cancer - colorectal in his sister; Cardiovascular in his brother; Diabetes in his brother and mother;  Thyroid Disease in his brother and mother.  SOCIAL HISTORY:   reports that he quit smoking about 17 years ago. His smoking use included cigarettes. He started smoking about 42 years ago. He has a 25.00 pack-year smoking history. He has never used smokeless tobacco. He reports current alcohol use of about 4.0 standard drinks per week. He reports that he does not use drugs.  Patient's living condition: lives with spouse    Post Discharge Medication Reconciliation Status:   MED REC REQUIRED  Post Medication Reconciliation Status: discharge medications reconciled and changed, per note/orders       Current Outpatient Medications   Medication Sig     acetaminophen (TYLENOL) 500 MG tablet Take 1,000 mg by mouth 3 times daily     apixaban ANTICOAGULANT (ELIQUIS ANTICOAGULANT) 5 MG tablet Take 1 tablet (5 mg) by mouth 2 times daily     atorvastatin (LIPITOR) 40 MG tablet Take 1 tablet (40 mg) by mouth every evening     carvedilol (COREG) 12.5 MG tablet Take 12.5 mg by mouth 2 times daily (with meals)     clopidogrel (PLAVIX) 75 MG tablet Take 1 tablet (75 mg) by mouth daily     digoxin (LANOXIN) 125 MCG tablet Take 125 mcg by mouth daily     furosemide (LASIX) 20 MG tablet Take 20 mg by mouth daily     gabapentin (NEURONTIN) 300 MG capsule Take 600-900 mg by mouth 3 times daily Give 600 mg; oral Twice A Day and 900 mg; oral Once A Day     HYDROmorphone (DILAUDID) 2 MG tablet Take 2-4 mg by mouth every 6 hours as needed for severe pain (7-10) 2-4 mg; oral Special Instructions: for moderate pain, pain score 5-7 or severe pain pain score 8-10     insulin aspart (NOVOLOG FLEXPEN) 100 UNIT/ML pen Inject Subcutaneous 3 times daily (with meals) Give 14 units; subcutaneous Special Instructions: with breakfast and dinner + sliding scale and 16 units; subcutaneous Special Instructions: with lunch + sliding scale  If Blood Sugar is 200 to 249, give 2 Units.  If Blood Sugar is 250 to 299, give 4 Units.  If Blood Sugar is 300 to 349,  "give 6 Units.  If Blood Sugar is 350 to 399, give 8 Units.  If Blood Sugar is 400 to 450, give 10 Units.  If Blood Sugar is greater than 450, give 12 Units.  subcutaneous  Special Instructions: Add to scheduled meal time insulin as ordered  With Meals  08:00, 12:00, 18:00     insulin glargine (LANTUS PEN) 100 UNIT/ML pen Inject 18 Units Subcutaneous every morning Hold for blood glucose less than 120     isosorbide mononitrate (IMDUR) 30 MG 24 hr tablet Take 1 tablet (30 mg) by mouth daily Appointment required for further refills     ketoconazole (NIZORAL) 2 % external cream Apply topically 2 times daily     levothyroxine (SYNTHROID/LEVOTHROID) 137 MCG tablet Take 1 tablet (137 mcg) by mouth daily Take 1 tablet by mouth once every evening     lisinopril (ZESTRIL) 10 MG tablet Take 10 mg by mouth daily     methocarbamol (ROBAXIN) 500 MG tablet Take 500 mg by mouth 2 times daily as needed for muscle spasms     nitroGLYcerin (NITROSTAT) 0.4 MG sublingual tablet For chest pain place 1 tablet under the tongue every 5 minutes for 3 doses. If symptoms persist 5 minutes after 1st dose call 911.     nortriptyline (PAMELOR) 25 MG capsule Take 25 mg by mouth At Bedtime     pantoprazole (PROTONIX) 40 MG EC tablet TAKE 1 TABLET BY MOUTH EVERY MORNING BEFORE BREAKFAST     sennosides (SENOKOT) 8.6 MG tablet Take 2 tablets by mouth 2 times daily     No current facility-administered medications for this visit.       ROS:  10 point ROS of systems including Constitutional, Eyes, Respiratory, Cardiovascular, Gastroenterology, Genitourinary, Integumentary, Musculoskeletal, Psychiatric were all negative except for pertinent positives noted in my HPI.    Vitals:  /74   Pulse 75   Temp 97.9  F (36.6  C)   Resp 18   Ht 1.905 m (6' 3\")   Wt 115.7 kg (255 lb)   SpO2 97%   BMI 31.87 kg/m    Exam:  Physical Exam  Constitutional:       General: He is not in acute distress.     Appearance: Normal appearance. He is obese.   HENT:      " Head: Normocephalic and atraumatic.   Eyes:      General: No scleral icterus.  Cardiovascular:      Rate and Rhythm: Normal rate and regular rhythm.      Heart sounds: No murmur heard.  Pulmonary:      Effort: Pulmonary effort is normal.      Breath sounds: Normal breath sounds.   Musculoskeletal:      Comments: 1+ edema   Neurological:      General: No focal deficit present.      Mental Status: He is alert.   Psychiatric:         Mood and Affect: Mood normal.         Behavior: Behavior normal.           Lab/Diagnostic data:  [facility]  [facility]    ASSESSMENT/PLAN:  Acute on chronic back pain  Degenerative disc disease status post bilateral L5-S1 transforaminal PREETI: Presented with complaints of worsening back pain.  MRI with degenerative disc disease.  Some concern for discitis/osteomyelitis.  Underwent biopsy that was negative for infection.  Ultimately underwent bilateral PREETI with improvement in pain  - Continue pain control with scheduled Tylenol, scheduled gabapentin, nortriptyline, as needed Robaxin, and as needed Dilaudid. Nortriptyline, robaxin and dilaudid are new medications  Has not required Dilaudid since admission to TCU.  Rates pain at a 4-5  - PT/OT/social work    Thoracic sebaceous cyst status post I&D  -Continue local wound care    COVID recovered: Noted on hospital admission.  Received 3 days of remdesivir  - COVID recovered.  No current symptoms      HFrEF  CAD  Hypertension: Most recent TTE with EF 30-35%.  Did have some hypotension during hospital stay and meds were held.  Ultimately resumed on discharge with the exception of reduced Coreg due to bradycardia.  Denies orthopnea or PND.  Has chronic stable lower extremity swelling  - Continue PTA regimen of Coreg, lisinopril, imdur, Lasix and Plavix  - Daily weights    Atrial fibrillation:   -Continue rate control with Coreg and digoxin.  Continue anticoagulation with Eliquis    Type 2 diabetes, A1c 9.3:  [Lantus 18 units, Novolog 14/16/14 and SSI] -392 since admission, though most in 200  -Continue current regimen    Chronic kidney disease: Baseline creatinine 0.9-1.1  - Avoid nephrotoxins  - BMP 12/1    Anemia: Baseline hemoglobin in 10s  - CBC 12/1    Orders:  Daily weights  BMP and CBC 12/1    Total time spent with patient visit at the skilled nursing facility was 40 min including patient visit and review of past records. Greater than 50% of total time spent with counseling and coordinating care    Electronically signed by:  eHrminia Rowe PA-C

## 2022-11-28 NOTE — LETTER
11/28/2022        RE: Jayro Elder  [address] [place] [address]        [facility] GERIATRICS    PRIMARY CARE PROVIDER AND CLINIC:  Physician No Ref-Primary, No address on file  Chief Complaint   Patient presents with     Hospital F/U      [place] Medical Record Number:  [id number]  Place of Service where encounter took place:  [facility] ([facility]) [[id number]]    Jayro Elder  is a 72 year old  (1950), admitted to the above facility from  [facility] . Hospital stay 11/6/22 through 11/26/22..   HPI:    Jayro Elder is a 72-year-old male with a past medical history of type 2 diabetes, atrial fibrillation on Eliquis, HFrEF, CAD on Plavix, chronic kidney disease and chronic back pain who was recently hospitalized at [facility] [place] [place] for acute on chronic back pain.  Has chronic back pain and presented with 2 weeks of worsening back pain.  MRI was obtained which showed degenerative disc disease, moderate to severe foraminal stenosis at L5-S1, though early discitis/osteomyelitis could not be excluded at T1.  Thoracic MRI showed a 2 x 2.5 x 3 point 5 subcutaneous fluid collection at T6/T7 concerning for abscess versus hematoma.  Neurosurgery was consulted who initially did not recommend surgery.  He was trialed on p.o. prednisone without improvement.  Due to possible osteomyelitis he was started on vancomycin and ceftriaxone briefly but stopped after infectious disease consultation.  Ultimately underwent CT-guided biopsy and cultures and pathology were negative.  He underwent bilateral L5-S1 transforaminal PREETI with improvement in his pain.  Additionally general surgery was consulted due to subcutaneous fluid collection.  He underwent I&D of sebaceous cyst.  He was incidentally found to be COVID-positive and received 3 days of remdesivir.  He was seen by therapies who recommended discharge to [facility].    Today Jayro is seen in his room.  He is overall doing well.  Has  not required p.o. Dilaudid since admission to TCU.  Rates pain at 3-4.  Notes that it is tolerable.  Had significant improvement after steroid injection.  Has chronic edema.  Denies orthopnea or PND.  Has a history of CHF and follows with regions.  States dry weight is approximately 245-250 pounds.  He lives with his wife, son and 2 grandkids in a multilevel home.  Has a stair lift.  Typically uses a cane for ambulation    Review of nursing home EMR: -144, heart rate 67-75, blood glucose 159-392.  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 Only residule: vision impaired.     Cerebral infarction (H)     eye sight decreased in peripheral of right side and blurry     Cerebral infarction (H) 2016     Congestive heart failure (H)      Diabetic ulcer of left midfoot associated with type 2 diabetes mellitus, with fat layer exposed (H) 3/13/2018     Essential hypertension 11/13/2002     Essential hypertension, benign 11/13/2002     Gastroesophageal reflux disease      Generalized osteoarthrosis, unspecified site 11/13/2002     Lightheadedness 12/27/2021     Microalbuminuria 3/13/2018    X1     Myocardial infarction (H)      Myocardial infarction (H) 06/28/2005     Neuropathy      Neuropathy 2016     Sepsis (H)      Sleep apnea     doesn't use it     Sleep apnea 2006     Stented coronary artery     x 10 stents     Substance abuse (H)      Substance abuse (H)      Syncope,  unspecified syncope type 3/13/2018     Syncope, unspecified syncope type 03/13/2018     Thyroid disease     takes medicine     Thyroid disease 2005     Tobacco use disorder 11/13/2002     Type 2 diabetes mellitus (H) 2000     Type 2 diabetes mellitus with diabetic peripheral angiopathy without gangrene, unspecified long term insulin use status 2005      PAST SURGICAL HISTORY:   has a past surgical history that includes NONSPECIFIC PROCEDURE; NONSPECIFIC PROCEDURE (Bilateral, 1998); NONSPECIFIC PROCEDURE (1959); angiogram (6/29/05); angiogram (7/05); angiogram (2/09); angiogram (11/13/13); Heart Cath Left heart cath (06/13/2017); back surgery; orthopedic surgery; orthopedic surgery; Soft tissue surgery; vascular surgery; Incision and drainage toe, combined (Bilateral, 9/11/2018); Amputate toe(s) (Right, 1/6/2019); Amputate toe(s) (Right, 1/8/2019); Amputate toe(s) (Right, 3/12/2019); Irrigation and debridement foot, combined (Left, 3/12/2019); Amputate toe(s) (Right, 5/6/2019); Coronary Angiogram (N/A, 7/8/2019); Percutaneous Coronary Intervention Stent (N/A, 7/8/2019); Percutaneous Coronary Intervention Aspiration Thrombectomy (N/A, 7/8/2019); Left Heart Catheterization (N/A, 7/8/2019); Amputate toe(s) (Left, 4/18/2020); Coronary Angiogram (N/A, 4/9/2021); Amputate toe(s) (Left, 5/8/2021); Cardiac catheterization (07/08/2019); Coronary Stent Placement (07/08/2019); Cardiac catheterization (Left, 06/13/2017); Cardiac catheterization (2005,2009,20013); vascular surgery; Amputate toe(s) (Right, 2019); other surgical history (9 years old); Shoulder Arthroscopy W/ Rotator Cuff Repair (Left, 2004); Heart Catheterization with Possible Intervention (N/A, 12/28/2021); Instantaneous Wave-Free Ratio (N/A, 12/28/2021); Left Ventriculogram (N/A, 12/28/2021); Left Heart Catheterization (N/A, 12/28/2021); and Amputate toe(s) (Left, 2/14/2022).  FAMILY HISTORY: family history includes Arthritis in his mother; Cancer in his father;  Cancer - colorectal in his sister; Cardiovascular in his brother; Diabetes in his brother and mother; Thyroid Disease in his brother and mother.  SOCIAL HISTORY:   reports that he quit smoking about 17 years ago. His smoking use included cigarettes. He started smoking about 42 years ago. He has a 25.00 pack-year smoking history. He has never used smokeless tobacco. He reports current alcohol use of about 4.0 standard drinks per week. He reports that he does not use drugs.  Patient's living condition: lives with spouse    Post Discharge Medication Reconciliation Status:   MED REC REQUIRED  Post Medication Reconciliation Status: discharge medications reconciled and changed, per note/orders       Current Outpatient Medications   Medication Sig     acetaminophen (TYLENOL) 500 MG tablet Take 1,000 mg by mouth 3 times daily     apixaban ANTICOAGULANT (ELIQUIS ANTICOAGULANT) 5 MG tablet Take 1 tablet (5 mg) by mouth 2 times daily     atorvastatin (LIPITOR) 40 MG tablet Take 1 tablet (40 mg) by mouth every evening     carvedilol (COREG) 12.5 MG tablet Take 12.5 mg by mouth 2 times daily (with meals)     clopidogrel (PLAVIX) 75 MG tablet Take 1 tablet (75 mg) by mouth daily     digoxin (LANOXIN) 125 MCG tablet Take 125 mcg by mouth daily     furosemide (LASIX) 20 MG tablet Take 20 mg by mouth daily     gabapentin (NEURONTIN) 300 MG capsule Take 600-900 mg by mouth 3 times daily Give 600 mg; oral Twice A Day and 900 mg; oral Once A Day     HYDROmorphone (DILAUDID) 2 MG tablet Take 2-4 mg by mouth every 6 hours as needed for severe pain (7-10) 2-4 mg; oral Special Instructions: for moderate pain, pain score 5-7 or severe pain pain score 8-10     insulin aspart (NOVOLOG FLEXPEN) 100 UNIT/ML pen Inject Subcutaneous 3 times daily (with meals) Give 14 units; subcutaneous Special Instructions: with breakfast and dinner + sliding scale and 16 units; subcutaneous Special Instructions: with lunch + sliding scale  If Blood Sugar is 200  "to 249, give 2 Units.  If Blood Sugar is 250 to 299, give 4 Units.  If Blood Sugar is 300 to 349, give 6 Units.  If Blood Sugar is 350 to 399, give 8 Units.  If Blood Sugar is 400 to 450, give 10 Units.  If Blood Sugar is greater than 450, give 12 Units.  subcutaneous  Special Instructions: Add to scheduled meal time insulin as ordered  With Meals  08:00, 12:00, 18:00     insulin glargine (LANTUS PEN) 100 UNIT/ML pen Inject 18 Units Subcutaneous every morning Hold for blood glucose less than 120     isosorbide mononitrate (IMDUR) 30 MG 24 hr tablet Take 1 tablet (30 mg) by mouth daily Appointment required for further refills     ketoconazole (NIZORAL) 2 % external cream Apply topically 2 times daily     levothyroxine (SYNTHROID/LEVOTHROID) 137 MCG tablet Take 1 tablet (137 mcg) by mouth daily Take 1 tablet by mouth once every evening     lisinopril (ZESTRIL) 10 MG tablet Take 10 mg by mouth daily     methocarbamol (ROBAXIN) 500 MG tablet Take 500 mg by mouth 2 times daily as needed for muscle spasms     nitroGLYcerin (NITROSTAT) 0.4 MG sublingual tablet For chest pain place 1 tablet under the tongue every 5 minutes for 3 doses. If symptoms persist 5 minutes after 1st dose call 911.     nortriptyline (PAMELOR) 25 MG capsule Take 25 mg by mouth At Bedtime     pantoprazole (PROTONIX) 40 MG EC tablet TAKE 1 TABLET BY MOUTH EVERY MORNING BEFORE BREAKFAST     sennosides (SENOKOT) 8.6 MG tablet Take 2 tablets by mouth 2 times daily     No current facility-administered medications for this visit.       ROS:  10 point ROS of systems including Constitutional, Eyes, Respiratory, Cardiovascular, Gastroenterology, Genitourinary, Integumentary, Musculoskeletal, Psychiatric were all negative except for pertinent positives noted in my HPI.    Vitals:  /74   Pulse 75   Temp 97.9  F (36.6  C)   Resp 18   Ht 1.905 m (6' 3\")   Wt 115.7 kg (255 lb)   SpO2 97%   BMI 31.87 kg/m    Exam:  Physical Exam  Constitutional:       " General: He is not in acute distress.     Appearance: Normal appearance. He is obese.   HENT:      Head: Normocephalic and atraumatic.   Eyes:      General: No scleral icterus.  Cardiovascular:      Rate and Rhythm: Normal rate and regular rhythm.      Heart sounds: No murmur heard.  Pulmonary:      Effort: Pulmonary effort is normal.      Breath sounds: Normal breath sounds.   Musculoskeletal:      Comments: 1+ edema   Neurological:      General: No focal deficit present.      Mental Status: He is alert.   Psychiatric:         Mood and Affect: Mood normal.         Behavior: Behavior normal.           Lab/Diagnostic data:  Recent labs in [facility] reviewed by me today.  In [facility]    ASSESSMENT/PLAN:  Acute on chronic back pain  Degenerative disc disease status post bilateral L5-S1 transforaminal PREETI: Presented with complaints of worsening back pain.  MRI with degenerative disc disease.  Some concern for discitis/osteomyelitis.  Underwent biopsy that was negative for infection.  Ultimately underwent bilateral PREETI with improvement in pain  - Continue pain control with scheduled Tylenol, scheduled gabapentin, nortriptyline, as needed Robaxin, and as needed Dilaudid. Nortriptyline, robaxin and dilaudid are new medications  Has not required Dilaudid since admission to TCU.  Rates pain at a 4-5  - PT/OT/social work    Thoracic sebaceous cyst status post I&D  -Continue local wound care    COVID recovered: Noted on hospital admission.  Received 3 days of remdesivir  - COVID recovered.  No current symptoms      HFrEF  CAD  Hypertension: Most recent TTE with EF 30-35%.  Did have some hypotension during hospital stay and meds were held.  Ultimately resumed on discharge with the exception of reduced Coreg due to bradycardia.  Denies orthopnea or PND.  Has chronic stable lower extremity swelling  - Continue PTA regimen of Coreg, lisinopril, imdur, Lasix and Plavix  - Daily weights    Atrial fibrillation:   -Continue rate  control with Coreg and digoxin.  Continue anticoagulation with Eliquis    Type 2 diabetes, A1c 9.3: [Lantus 18 units, Novolog 14/16/14 and SSI] -392 since admission, though most in 200  -Continue current regimen    Chronic kidney disease: Baseline creatinine 0.9-1.1  - Avoid nephrotoxins  - BMP 12/1    Anemia: Baseline hemoglobin in 10s  - CBC 12/1    Orders:  Daily weights  BMP and CBC 12/1    Total time spent with patient visit at the skilled nursing facility was 40 min including patient visit and review of past records. Greater than 50% of total time spent with counseling and coordinating care    Electronically signed by:  Herminia Rowe PA-C                       Sincerely,        Herminia Rowe PA-C

## 2022-12-01 NOTE — LETTER
12/1/2022        RE: Jayro Elder  [id number]  [place] [address]          Chief Complaint   Patient presents with     RECHECK       HPI:  Jayro Elder is a 72 year old  (1950), who is being seen today for an episodic care visit at: [facility] ([facility]) [[id number]].     Brief summary: Jayro Elder is a 72-year-old male with a past medical history of type 2 diabetes, atrial fibrillation on Eliquis, HFrEF, CAD on Plavix, chronic kidney disease and chronic back pain who was recently hospitalized at [facility] for acute on chronic back pain.  Has chronic back pain and presented with 2 weeks of worsening back pain.  MRI was obtained which showed degenerative disc disease, moderate to severe foraminal stenosis at L5-S1, though early discitis/osteomyelitis could not be excluded at T1.  Thoracic MRI showed a 2 x 2.5 x 3 point 5 subcutaneous fluid collection at T6/T7 concerning for abscess versus hematoma.  Neurosurgery was consulted who initially did not recommend surgery.  He was trialed on p.o. prednisone without improvement.  Due to possible osteomyelitis he was started on vancomycin and ceftriaxone briefly but stopped after infectious disease consultation.  Ultimately underwent CT-guided biopsy and cultures and pathology were negative.  He underwent bilateral L5-S1 transforaminal PREETI with improvement in his pain.  Additionally general surgery was consulted due to subcutaneous fluid collection.  He underwent I&D of sebaceous cyst.  He was incidentally found to be COVID-positive and received 3 days of remdesivir.  He was seen by therapies who recommended discharge to [facility].    Today's concern is: Jayro is seen today for follow-up.  Back pain has improved significantly since his steroid injection.  Making progress with physical therapy.  Walking up to 200 feet with FWW and contact-guard assist had some mild hypoglycemia yesterday.  Some blood glucoses 75-79.  Interestingly most  "hypoglycemic episodes occur at dinner.  Jayro does not feel these are truly hypoglycemic.  Does get symptomatic with hypoglycemia but needs to be in the 50s.  Remainder of blood glucoses 150-250.  He denies chest pain, orthopnea or PND.  Lower extremity edema is stable.  Does agree with slight reduction of lunch NovoLog    Review of nursing home EMR: -145, BG     Allergies, and PMH/PSH reviewed in [facility] today.    REVIEW OF SYSTEMS:  4 point ROS including Respiratory, CV, GI and , other than that noted in the HPI,  is negative    Objective:   BP (!) 143/77   Pulse 69   Temp 98.2  F (36.8  C)   Resp 17   Ht 1.905 m (6' 3\")   Wt 105.9 kg (233 lb 6.4 oz)   SpO2 96%   BMI 29.17 kg/m      Physical Exam  Constitutional:       Appearance: Normal appearance.   HENT:      Head: Normocephalic and atraumatic.   Eyes:      General: No scleral icterus.  Cardiovascular:      Rate and Rhythm: Normal rate and regular rhythm.   Pulmonary:      Breath sounds: No rales.   Musculoskeletal:      Comments: Compression socks in place   Skin:     General: Skin is warm and dry.      Findings: No rash.   Neurological:      General: No focal deficit present.      Mental Status: He is alert.      Cranial Nerves: No cranial nerve deficit.   Psychiatric:         Mood and Affect: Mood normal.         Behavior: Behavior normal.          MED REC REQUIRED  Post Medication Reconciliation Status: medication reconcilation previously completed during another office visit      Recent labs in [facility] reviewed by me today.  and   Most Recent 3 CBC's:Recent Labs   Lab Test 12/01/22  0536 05/09/22  0556 01/07/22  0652   WBC 3.6* 3.5* 3.7*   HGB 10.0* 10.4* 10.0*   MCV 92 91 88    273 224     Most Recent 3 BMP's:Recent Labs   Lab Test 12/01/22  0536 05/09/22  0556 02/14/22  1350 02/14/22  1143 02/11/22  1010    139  --   --  137   POTASSIUM 4.0 3.9  --   --  4.0   CHLORIDE 102 101  --   --  106   CO2 27 26  --   --  26   BUN 18.9 " 16  --   --  17   CR 1.08 1.04  --   --  1.10   ANIONGAP 10 12  --   --  5   BETTIE 9.3 9.8  --   --  8.5   * 87 142*   < > 196*    < > = values in this interval not displayed.       Assessment/Plan:  Acute on chronic back pain  Degenerative disc disease status post bilateral L5-S1 transforaminal PREETI: Presented with complaints of worsening back pain.  MRI with degenerative disc disease.  Some concern for discitis/osteomyelitis.  Underwent biopsy that was negative for infection.  Ultimately underwent bilateral PREETI with improvement in pain  - Continue pain control with scheduled Tylenol, scheduled gabapentin, nortriptyline, as needed Robaxin, and as needed Dilaudid. Nortriptyline, robaxin and dilaudid are new medications .  Has not used Robaxin since admission and minimal Dilaudid.  - PT/OT    Thoracic sebaceous cyst status post I&D  -Continue local wound care    COVID recovered: Noted on hospital admission.  Received 3 days of remdesivir  - COVID recovered.  No current symptoms      HFrEF  CAD  Hypertension: Most recent TTE with EF 30-35%.  Did have some hypotension during hospital stay and meds were held.  Ultimately resumed on discharge with the exception of reduced Coreg due to bradycardia.  Denies orthopnea or PND.  Has chronic stable lower extremity swelling  - Continue PTA regimen of Coreg, lisinopril, imdur, Lasix and Plavix  -  Weight stable    Atrial fibrillation:   -Continue rate control with Coreg and digoxin.  Continue anticoagulation with Eliquis    Type 2 diabetes, A1c 9.3: [Lantus 18 units, Novolog 14/16/14 and SSI] couple mild hypoglycemic episodes with dinner.  Patient does have hypoglycemia awareness.  -Reduce mealtime NovoLog to 14/14/14.  Continue remaining regimen unchanged    Chronic kidney disease: Baseline creatinine 0.9-1.1  - Avoid nephrotoxins  - BMP 12/1 with stable creatinine    Anemia: Baseline hemoglobin in 10s  - CBC 12/1 with stable hemoglobin    Orders:  Reduced lunch NovoLog to  14 units daily        Electronically signed by: Herminia Rowe PA-C           Sincerely,        Herminia Rowe PA-C

## 2022-12-01 NOTE — PROGRESS NOTES
Chief Complaint   Patient presents with     RECHECK       HPI:  Jayro Elder is a 72 year old  (1950), who is being seen today for an episodic care visit at: [facility] ([facility]) [[id number]].     Brief summary: Jayro Elder is a 72-year-old male with a past medical history of type 2 diabetes, atrial fibrillation on Eliquis, HFrEF, CAD on Plavix, chronic kidney disease and chronic back pain who was recently hospitalized at [facility] for acute on chronic back pain.  Has chronic back pain and presented with 2 weeks of worsening back pain.  MRI was obtained which showed degenerative disc disease, moderate to severe foraminal stenosis at L5-S1, though early discitis/osteomyelitis could not be excluded at T1.  Thoracic MRI showed a 2 x 2.5 x 3 point 5 subcutaneous fluid collection at T6/T7 concerning for abscess versus hematoma.  Neurosurgery was consulted who initially did not recommend surgery.  He was trialed on p.o. prednisone without improvement.  Due to possible osteomyelitis he was started on vancomycin and ceftriaxone briefly but stopped after infectious disease consultation.  Ultimately underwent CT-guided biopsy and cultures and pathology were negative.  He underwent bilateral L5-S1 transforaminal PREETI with improvement in his pain.  Additionally general surgery was consulted due to subcutaneous fluid collection.  He underwent I&D of sebaceous cyst.  He was incidentally found to be COVID-positive and received 3 days of remdesivir.  He was seen by therapies who recommended discharge to [facility].    Today's concern is: Jayro is seen today for follow-up.  Back pain has improved significantly since his steroid injection.  Making progress with physical therapy.  Walking up to 200 feet with FWW and contact-guard assist had some mild hypoglycemia yesterday.  Some blood glucoses 75-79.  Interestingly most hypoglycemic episodes occur at dinner.  Jayro does not feel these are truly hypoglycemic.  Does  "get symptomatic with hypoglycemia but needs to be in the 50s.  Remainder of blood glucoses 150-250.  He denies chest pain, orthopnea or PND.  Lower extremity edema is stable.  Does agree with slight reduction of lunch NovoLog    Review of nursing home EMR: -145, BG     Allergies, and PMH/PSH reviewed in [facility] today.    REVIEW OF SYSTEMS:  4 point ROS including Respiratory, CV, GI and , other than that noted in the HPI,  is negative    Objective:   BP (!) 143/77   Pulse 69   Temp 98.2  F (36.8  C)   Resp 17   Ht 1.905 m (6' 3\")   Wt 105.9 kg (233 lb 6.4 oz)   SpO2 96%   BMI 29.17 kg/m      Physical Exam  Constitutional:       Appearance: Normal appearance.   HENT:      Head: Normocephalic and atraumatic.   Eyes:      General: No scleral icterus.  Cardiovascular:      Rate and Rhythm: Normal rate and regular rhythm.   Pulmonary:      Breath sounds: No rales.   Musculoskeletal:      Comments: Compression socks in place   Skin:     General: Skin is warm and dry.      Findings: No rash.   Neurological:      General: No focal deficit present.      Mental Status: He is alert.      Cranial Nerves: No cranial nerve deficit.   Psychiatric:         Mood and Affect: Mood normal.         Behavior: Behavior normal.          MED REC REQUIRED  Post Medication Reconciliation Status: medication reconcilation previously completed during another office visit      Recent labs in [facility] reviewed by me today.  and   Most Recent 3 CBC's:Recent Labs   Lab Test 12/01/22  0536 05/09/22  0556 01/07/22  0652   WBC 3.6* 3.5* 3.7*   HGB 10.0* 10.4* 10.0*   MCV 92 91 88    273 224     Most Recent 3 BMP's:Recent Labs   Lab Test 12/01/22  0536 05/09/22  0556 02/14/22  1350 02/14/22  1143 02/11/22  1010    139  --   --  137   POTASSIUM 4.0 3.9  --   --  4.0   CHLORIDE 102 101  --   --  106   CO2 27 26  --   --  26   BUN 18.9 16  --   --  17   CR 1.08 1.04  --   --  1.10   ANIONGAP 10 12  --   --  5   BETTIE 9.3 9.8  --   " --  8.5   * 87 142*   < > 196*    < > = values in this interval not displayed.       Assessment/Plan:  Acute on chronic back pain  Degenerative disc disease status post bilateral L5-S1 transforaminal PREETI: Presented with complaints of worsening back pain.  MRI with degenerative disc disease.  Some concern for discitis/osteomyelitis.  Underwent biopsy that was negative for infection.  Ultimately underwent bilateral PREETI with improvement in pain  - Continue pain control with scheduled Tylenol, scheduled gabapentin, nortriptyline, as needed Robaxin, and as needed Dilaudid. Nortriptyline, robaxin and dilaudid are new medications .  Has not used Robaxin since admission and minimal Dilaudid.  - PT/OT    Thoracic sebaceous cyst status post I&D  -Continue local wound care    COVID recovered: Noted on hospital admission.  Received 3 days of remdesivir  - COVID recovered.  No current symptoms      HFrEF  CAD  Hypertension: Most recent TTE with EF 30-35%.  Did have some hypotension during hospital stay and meds were held.  Ultimately resumed on discharge with the exception of reduced Coreg due to bradycardia.  Denies orthopnea or PND.  Has chronic stable lower extremity swelling  - Continue PTA regimen of Coreg, lisinopril, imdur, Lasix and Plavix  -  Weight stable    Atrial fibrillation:   -Continue rate control with Coreg and digoxin.  Continue anticoagulation with Eliquis    Type 2 diabetes, A1c 9.3: [Lantus 18 units, Novolog 14/16/14 and SSI] couple mild hypoglycemic episodes with dinner.  Patient does have hypoglycemia awareness.  -Reduce mealtime NovoLog to 14/14/14.  Continue remaining regimen unchanged    Chronic kidney disease: Baseline creatinine 0.9-1.1  - Avoid nephrotoxins  - BMP 12/1 with stable creatinine    Anemia: Baseline hemoglobin in 10s  - CBC 12/1 with stable hemoglobin    Orders:  Reduced lunch NovoLog to 14 units daily        Electronically signed by: Herminia Rowe PA-C

## 2022-12-05 NOTE — LETTER
12/5/2022        RE: Jayro Elder  [address] [address] [facility] [address]        [facility] GERIATRICS DISCHARGE SUMMARY  PATIENT'S NAME: Jayro Elder  YOB: 1950  MEDICAL RECORD NUMBER:  [id number]  Place of Service where encounter took place:  [facility] ([facility]) [[id number]]    PRIMARY CARE PROVIDER AND CLINIC RESPONSIBLE AFTER TRANSFER:   Herminia Rowe PA-C, [address] / [place] [address]    Non-[facility] Provider     Transferring providers: Herminia Rowe PA-C, Che Venegas MD  Recent Hospitalization/ED:  Hospital  [facility]  stay 11/6/22 to 11/26/22.  Date of SNF Admission: November 26, 2022  Date of SNF (anticipated) Discharge: December 06, 2022  Discharged to: previous independent home  Cognitive Scores: BIMS 15/15  Physical Function: Ambulating 150-200 ft ft with FWW and CGA  DME: No new DME needed    CODE STATUS/ADVANCE DIRECTIVES DISCUSSION:  FUll  ALLERGIES: No known allergies    NURSING FACILITY COURSE   Medication Changes/Rationale:     See below    Summary of nursing facility stay:   Jayro Elder is a 72-year-old male with a past medical history of type 2 diabetes, atrial fibrillation on Eliquis, HFrEF, CAD on Plavix, chronic kidney disease and chronic back pain who was recently hospitalized at [facility] for acute on chronic back pain.  Has chronic back pain and presented with 2 weeks of worsening back pain.  MRI was obtained which showed degenerative disc disease, moderate to severe foraminal stenosis at L5-S1, though early discitis/osteomyelitis could not be excluded at T1.  Thoracic MRI showed a 2 x 2.5 x 3 point 5 subcutaneous fluid collection at T6/T7 concerning for abscess versus hematoma.  Neurosurgery was consulted who initially did not recommend surgery.  He was trialed on p.o. prednisone without improvement.  Due to possible osteomyelitis he was started on vancomycin and ceftriaxone briefly but stopped  after infectious disease consultation.  Ultimately underwent CT-guided biopsy and cultures and pathology were negative.  He underwent bilateral L5-S1 transforaminal PREETI with improvement in his pain.  Additionally general surgery was consulted due to subcutaneous fluid collection.  He underwent I&D of sebaceous cyst.  He was incidentally found to be COVID-positive and received 3 days of remdesivir.  He was seen by therapies who recommended discharge to TCU.    Ralp had improvement in his back pain.  He progressed with physical therapy.  He will be discharging home with home care    Acute on chronic back pain  Degenerative disc disease status post bilateral L5-S1 transforaminal PREETI: Presented with complaints of worsening back pain.  MRI with degenerative disc disease.  Some concern for discitis/osteomyelitis.  Underwent biopsy that was negative for infection.  Ultimately underwent bilateral PREETI with improvement in pain  - Continue pain control with scheduled Tylenol, scheduled gabapentin &  Nortriptyline.  Declines need for Robaxin or Dilaudid at discharge so these will be discontinued-  - Home PT/OT    Thoracic sebaceous cyst status post I&D  -Continue local wound care    COVID recovered: Noted on hospital admission.  Received 3 days of remdesivir  - COVID recovered.  No current symptoms      HFrEF  CAD  Hypertension: Most recent TTE with EF 30-35%.  Did have some hypotension during hospital stay and meds were held.  Ultimately resumed on discharge with the exception of reduced Coreg due to bradycardia.  Denies orthopnea or PND.  Has chronic stable lower extremity swelling  - Continue PTA regimen of Coreg, lisinopril, imdur, Lasix and Plavix  -  Weight stable    Atrial fibrillation:   -Continue rate control with Coreg and digoxin.  Continue anticoagulation with Eliquis    Type 2 diabetes, A1c 9.3: [Lantus 18 units, Novolog 14/16/14 and SSI] couple mild hypoglycemic episodes with dinner.  Patient does have  hypoglycemia awareness.  - Continue slightly reduced mealtime NovoLog 14/14/14.  Continue remaining regimen unchanged    Chronic kidney disease: Baseline creatinine 0.9-1.1  - Avoid nephrotoxins  - BMP 12/1 with stable creatinine    Anemia: Baseline hemoglobin in 10s  - CBC 12/1 with stable hemoglobin        Discharge Medications:  MED REC REQUIRED  Post Medication Reconciliation Status: patient was not discharged from an inpatient facility or TCU       Current Outpatient Medications   Medication Sig Dispense Refill     acetaminophen (TYLENOL) 500 MG tablet Take 1,000 mg by mouth 3 times daily       apixaban ANTICOAGULANT (ELIQUIS ANTICOAGULANT) 5 MG tablet Take 1 tablet (5 mg) by mouth 2 times daily 60 tablet 0     atorvastatin (LIPITOR) 40 MG tablet Take 1 tablet (40 mg) by mouth every evening 90 tablet 2     carvedilol (COREG) 12.5 MG tablet Take 12.5 mg by mouth 2 times daily (with meals)       clopidogrel (PLAVIX) 75 MG tablet Take 1 tablet (75 mg) by mouth daily 30 tablet 0     digoxin (LANOXIN) 125 MCG tablet Take 125 mcg by mouth daily       furosemide (LASIX) 20 MG tablet Take 20 mg by mouth daily       gabapentin (NEURONTIN) 300 MG capsule Take 600-900 mg by mouth 3 times daily Give 600 mg; oral Twice A Day and 900 mg; oral Once A Day       insulin glargine (LANTUS PEN) 100 UNIT/ML pen Inject 18 Units Subcutaneous every morning Hold for blood glucose less than 120 9 mL 0     isosorbide mononitrate (IMDUR) 30 MG 24 hr tablet Take 1 tablet (30 mg) by mouth daily Appointment required for further refills 90 tablet 0     ketoconazole (NIZORAL) 2 % external cream Apply topically 2 times daily       levothyroxine (SYNTHROID/LEVOTHROID) 137 MCG tablet Take 1 tablet (137 mcg) by mouth daily Take 1 tablet by mouth once every evening 30 tablet 0     lisinopril (ZESTRIL) 10 MG tablet Take 10 mg by mouth daily       nitroGLYcerin (NITROSTAT) 0.4 MG sublingual tablet For chest pain place 1 tablet under the tongue every  5 minutes for 3 doses. If symptoms persist 5 minutes after 1st dose call 911. 15 tablet 3     nortriptyline (PAMELOR) 25 MG capsule Take 25 mg by mouth At Bedtime       pantoprazole (PROTONIX) 40 MG EC tablet TAKE 1 TABLET BY MOUTH EVERY MORNING BEFORE BREAKFAST 90 tablet 1     insulin aspart (NOVOLOG FLEXPEN) 100 UNIT/ML pen Inject Subcutaneous 3 times daily (with meals) Give 14 units tid with meals and SSI  If Blood Sugar is 200 to 249, give 2 Units.  If Blood Sugar is 250 to 299, give 4 Units.  If Blood Sugar is 300 to 349, give 6 Units.  If Blood Sugar is 350 to 399, give 8 Units.  If Blood Sugar is 400 to 450, give 10 Units.  If Blood Sugar is greater than 450, give 12 Units.  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 Only residule: vision impaired.     Cerebral infarction (H)     eye sight decreased in peripheral of right side and blurry     Cerebral infarction (H) 2016     Congestive heart failure (H)      Diabetic ulcer of left midfoot associated with type 2 diabetes mellitus, with fat layer exposed (H) 3/13/2018     Essential hypertension 11/13/2002     Essential hypertension, benign 11/13/2002     Gastroesophageal reflux disease      Generalized osteoarthrosis, unspecified site 11/13/2002     Lightheadedness 12/27/2021     Microalbuminuria 3/13/2018    X1     Myocardial infarction (H)       "Myocardial infarction (H) 06/28/2005     Neuropathy      Neuropathy 2016     Sepsis (H)      Sleep apnea     doesn't use it     Sleep apnea 2006     Stented coronary artery     x 10 stents     Substance abuse (H)      Substance abuse (H)      Syncope, unspecified syncope type 3/13/2018     Syncope, unspecified syncope type 03/13/2018     Thyroid disease     takes medicine     Thyroid disease 2005     Tobacco use disorder 11/13/2002     Type 2 diabetes mellitus (H) 2000     Type 2 diabetes mellitus with diabetic peripheral angiopathy without gangrene, unspecified long term insulin use status 2005     Physical Exam:   Vitals: /66   Pulse 64   Temp 98  F (36.7  C)   Resp 17   Ht 1.905 m (6' 3\")   Wt 105.7 kg (233 lb)   SpO2 97%   BMI 29.12 kg/m    BMI: Body mass index is 29.12 kg/m .  Physical Exam  Constitutional:       Appearance: Normal appearance.   HENT:      Head: Normocephalic and atraumatic.   Eyes:      General: No scleral icterus.  Cardiovascular:      Rate and Rhythm: Normal rate and regular rhythm.      Heart sounds: No murmur heard.  Pulmonary:      Effort: Pulmonary effort is normal.   Musculoskeletal:      Right lower leg: No edema.      Left lower leg: No edema.   Skin:     General: Skin is warm and dry.      Findings: No rash.   Neurological:      General: No focal deficit present.      Mental Status: He is alert.           SNF labs: Recent labs in [facility] reviewed by me today.  and   Most Recent 3 CBC's:Recent Labs   Lab Test 12/01/22  0536 05/09/22  0556 01/07/22  0652   WBC 3.6* 3.5* 3.7*   HGB 10.0* 10.4* 10.0*   MCV 92 91 88    273 224     Most Recent 3 BMP's:Recent Labs   Lab Test 12/01/22  0536 05/09/22  0556 02/14/22  1350 02/14/22  1143 02/11/22  1010    139  --   --  137   POTASSIUM 4.0 3.9  --   --  4.0   CHLORIDE 102 101  --   --  106   CO2 27 26  --   --  26   BUN 18.9 16  --   --  17   CR 1.08 1.04  --   --  1.10   ANIONGAP 10 12  --   --  5   BETTIE 9.3 9.8  --   --  " 8.5   * 87 142*   < > 196*    < > = values in this interval not displayed.       DISCHARGE PLAN:    Follow up labs: No labs orders/due    Medical Follow Up:      Follow up with primary care provider in 1-2 weeks    [facility] scheduled appointments:       Discharge Services: Home Care:  Occupational Therapy and Physical Therapy    Discharge Instructions Verbalized to Patient at Discharge:     Monitor blood glucose monitoring 4 times a day. Keep a record and bring it to your next primary provider visit.     Weigh yourself daily in the morning and keep a record. Call your primary clinic: a) if you are more short of breath, or b) if your weight changes more than 3 pounds in one day or more than 5 pounds in one week.     Wound care: For Upper Back Cyst Removal Surgical Site -every other day 1. Clean wound with moist 4x4 gauze 2. Apply Skin Prep to Periwound Skin. Let Dry. 3. Gently pack saline moistened cut to fit piece of Hydrofera Blue into wound bed. 4. Cover with silicone foam border or island dressing. Time and date. Change Q48 hours. Notify provider and wound care nurse if site worsens or develops signs of infection.    TOTAL DISCHARGE TIME:   Greater than 30 minutes  Electronically signed by:  Herminia Rowe PA-C     Home care Face to Face documentation done in [facility] attached to Home care orders for [facility].                   Sincerely,        Herminia Rowe PA-C

## 2022-12-05 NOTE — PATIENT INSTRUCTIONS
Jayro Elder  YOB: 1950                                   Discharge Date: 12/6/22      PHYSICIAN's DISCHARGE SUMMARY / ORDER SHEET  1. Discharge to: Home  2. Medications:      Current Outpatient Medications   Medication Sig Dispense Refill    acetaminophen (TYLENOL) 500 MG tablet Take 1,000 mg by mouth 3 times daily      apixaban ANTICOAGULANT (ELIQUIS ANTICOAGULANT) 5 MG tablet Take 1 tablet (5 mg) by mouth 2 times daily 60 tablet 0    atorvastatin (LIPITOR) 40 MG tablet Take 1 tablet (40 mg) by mouth every evening 90 tablet 2    carvedilol (COREG) 12.5 MG tablet Take 12.5 mg by mouth 2 times daily (with meals)      clopidogrel (PLAVIX) 75 MG tablet Take 1 tablet (75 mg) by mouth daily 30 tablet 0    digoxin (LANOXIN) 125 MCG tablet Take 125 mcg by mouth daily      furosemide (LASIX) 20 MG tablet Take 20 mg by mouth daily      gabapentin (NEURONTIN) 300 MG capsule Take 600-900 mg by mouth 3 times daily Give 600 mg; oral Twice A Day and 900 mg; oral Once A Day      insulin glargine (LANTUS PEN) 100 UNIT/ML pen Inject 18 Units Subcutaneous every morning Hold for blood glucose less than 120 9 mL 0    isosorbide mononitrate (IMDUR) 30 MG 24 hr tablet Take 1 tablet (30 mg) by mouth daily Appointment required for further refills 90 tablet 0    ketoconazole (NIZORAL) 2 % external cream Apply topically 2 times daily      levothyroxine (SYNTHROID/LEVOTHROID) 137 MCG tablet Take 1 tablet (137 mcg) by mouth daily Take 1 tablet by mouth once every evening 30 tablet 0    lisinopril (ZESTRIL) 10 MG tablet Take 10 mg by mouth daily      nitroGLYcerin (NITROSTAT) 0.4 MG sublingual tablet For chest pain place 1 tablet under the tongue every 5 minutes for 3 doses. If symptoms persist 5 minutes after 1st dose call 911. 15 tablet 3    nortriptyline (PAMELOR) 25 MG capsule Take 25 mg by mouth At Bedtime      pantoprazole (PROTONIX) 40 MG EC tablet TAKE 1 TABLET BY MOUTH EVERY MORNING BEFORE BREAKFAST 90 tablet 1     insulin aspart (NOVOLOG FLEXPEN) 100 UNIT/ML pen Inject Subcutaneous 3 times daily (with meals) Give 14 units tid with meals and SSI  If Blood Sugar is 200 to 249, give 2 Units.  If Blood Sugar is 250 to 299, give 4 Units.  If Blood Sugar is 300 to 349, give 6 Units.  If Blood Sugar is 350 to 399, give 8 Units.  If Blood Sugar is 400 to 450, give 10 Units.  If Blood Sugar is greater than 450, give 12 Units.  subcutaneous  Special Instructions: Add to scheduled meal time insulin as ordered  With Meals  08:00, 12:00, 18:00      sennosides (SENOKOT) 8.6 MG tablet Take 2 tablets by mouth 2 times daily        DISCHARGE PLAN:  Follow up labs: No labs orders/due  Medical Follow Up:      Follow up with primary care provider in 1-2 weeks  [facility] scheduled appointments:     Discharge Services: Home Care:  Occupational Therapy and Physical Therapy  Discharge Instructions Verbalized to Patient at Discharge:   Monitor blood glucose monitoring 4 times a day. Keep a record and bring it to your next primary provider visit.   Weigh yourself daily in the morning and keep a record. Call your primary clinic: a) if you are more short of breath, or b) if your weight changes more than 3 pounds in one day or more than 5 pounds in one week.   Wound care: For Upper Back Cyst Removal Surgical Site -every other day 1. Clean wound with moist 4x4 gauze 2. Apply Skin Prep to Periwound Skin. Let Dry. 3. Gently pack saline moistened cut to fit piece of Hydrofera Blue into wound bed. 4. Cover with silicone foam border or island dressing. Time and date. Change Q48 hours. Notify provider and wound care nurse if site worsens or develops signs of infection.      Discharge patient to home with current medications and treatments  Discharge Home with Home care as above  - Nursing call in 30 days supply of needed meds to pharmacy of choice upon discharge. Future refills by PCP Dr. Herminia Rowe with phone number [phone].  - Please send  home with original of these discharge instructions and copy for chart.       ______________________________  Herminia Rowe PA-C   [facility]                   12/5/2022

## 2022-12-05 NOTE — PROGRESS NOTES
[facility] GERIATRICS DISCHARGE SUMMARY  PATIENT'S NAME: Jayro Elder  YOB: 1950  MEDICAL RECORD NUMBER:  [id number]  Place of Service where encounter took place:  [facility] [[id number]]    PRIMARY CARE PROVIDER AND CLINIC RESPONSIBLE AFTER TRANSFER:   Herminia Rowe PA-C, [address] / [place] [address]    Non-FMG Provider     Transferring providers: Herminia Rowe PA-C, Che Venegas MD  Recent Hospitalization/ED:  Hospital  [facility]  stay 11/6/22 to 11/26/22.  Date of SNF Admission: November 26, 2022  Date of SNF (anticipated) Discharge: December 06, 2022  Discharged to: previous independent home  Cognitive Scores: BIMS 15/15  Physical Function: Ambulating 150-200 ft ft with FWW and CGA  DME: No new DME needed    CODE STATUS/ADVANCE DIRECTIVES DISCUSSION:  FUll  ALLERGIES: No known allergies    NURSING FACILITY COURSE   Medication Changes/Rationale:     See below    Summary of nursing facility stay:   Jayro Elder is a 72-year-old male with a past medical history of type 2 diabetes, atrial fibrillation on Eliquis, HFrEF, CAD on Plavix, chronic kidney disease and chronic back pain who was recently hospitalized at [facility] for acute on chronic back pain.  Has chronic back pain and presented with 2 weeks of worsening back pain.  MRI was obtained which showed degenerative disc disease, moderate to severe foraminal stenosis at L5-S1, though early discitis/osteomyelitis could not be excluded at T1.  Thoracic MRI showed a 2 x 2.5 x 3 point 5 subcutaneous fluid collection at T6/T7 concerning for abscess versus hematoma.  Neurosurgery was consulted who initially did not recommend surgery.  He was trialed on p.o. prednisone without improvement.  Due to possible osteomyelitis he was started on vancomycin and ceftriaxone briefly but stopped after infectious disease consultation.  Ultimately underwent CT-guided biopsy and cultures and  pathology were negative.  He underwent bilateral L5-S1 transforaminal PREETI with improvement in his pain.  Additionally general surgery was consulted due to subcutaneous fluid collection.  He underwent I&D of sebaceous cyst.  He was incidentally found to be COVID-positive and received 3 days of remdesivir.  He was seen by therapies who recommended discharge to TCU.    Ralp had improvement in his back pain.  He progressed with physical therapy.  He will be discharging home with home care    Acute on chronic back pain  Degenerative disc disease status post bilateral L5-S1 transforaminal PREETI: Presented with complaints of worsening back pain.  MRI with degenerative disc disease.  Some concern for discitis/osteomyelitis.  Underwent biopsy that was negative for infection.  Ultimately underwent bilateral PREETI with improvement in pain  - Continue pain control with scheduled Tylenol, scheduled gabapentin &  Nortriptyline.  Declines need for Robaxin or Dilaudid at discharge so these will be discontinued-  - Home PT/OT    Thoracic sebaceous cyst status post I&D  -Continue local wound care    COVID recovered: Noted on hospital admission.  Received 3 days of remdesivir  - COVID recovered.  No current symptoms      HFrEF  CAD  Hypertension: Most recent TTE with EF 30-35%.  Did have some hypotension during hospital stay and meds were held.  Ultimately resumed on discharge with the exception of reduced Coreg due to bradycardia.  Denies orthopnea or PND.  Has chronic stable lower extremity swelling  - Continue PTA regimen of Coreg, lisinopril, imdur, Lasix and Plavix  -  Weight stable    Atrial fibrillation:   -Continue rate control with Coreg and digoxin.  Continue anticoagulation with Eliquis    Type 2 diabetes, A1c 9.3: [Lantus 18 units, Novolog 14/16/14 and SSI] couple mild hypoglycemic episodes with dinner.  Patient does have hypoglycemia awareness.  - Continue slightly reduced mealtime NovoLog 14/14/14.  Continue remaining  regimen unchanged    Chronic kidney disease: Baseline creatinine 0.9-1.1  - Avoid nephrotoxins  - BMP 12/1 with stable creatinine    Anemia: Baseline hemoglobin in 10s  - CBC 12/1 with stable hemoglobin        Discharge Medications:  MED REC REQUIRED  Post Medication Reconciliation Status: patient was not discharged from an inpatient facility or TCU       Current Outpatient Medications   Medication Sig Dispense Refill     acetaminophen (TYLENOL) 500 MG tablet Take 1,000 mg by mouth 3 times daily       apixaban ANTICOAGULANT (ELIQUIS ANTICOAGULANT) 5 MG tablet Take 1 tablet (5 mg) by mouth 2 times daily 60 tablet 0     atorvastatin (LIPITOR) 40 MG tablet Take 1 tablet (40 mg) by mouth every evening 90 tablet 2     carvedilol (COREG) 12.5 MG tablet Take 12.5 mg by mouth 2 times daily (with meals)       clopidogrel (PLAVIX) 75 MG tablet Take 1 tablet (75 mg) by mouth daily 30 tablet 0     digoxin (LANOXIN) 125 MCG tablet Take 125 mcg by mouth daily       furosemide (LASIX) 20 MG tablet Take 20 mg by mouth daily       gabapentin (NEURONTIN) 300 MG capsule Take 600-900 mg by mouth 3 times daily Give 600 mg; oral Twice A Day and 900 mg; oral Once A Day       insulin glargine (LANTUS PEN) 100 UNIT/ML pen Inject 18 Units Subcutaneous every morning Hold for blood glucose less than 120 9 mL 0     isosorbide mononitrate (IMDUR) 30 MG 24 hr tablet Take 1 tablet (30 mg) by mouth daily Appointment required for further refills 90 tablet 0     ketoconazole (NIZORAL) 2 % external cream Apply topically 2 times daily       levothyroxine (SYNTHROID/LEVOTHROID) 137 MCG tablet Take 1 tablet (137 mcg) by mouth daily Take 1 tablet by mouth once every evening 30 tablet 0     lisinopril (ZESTRIL) 10 MG tablet Take 10 mg by mouth daily       nitroGLYcerin (NITROSTAT) 0.4 MG sublingual tablet For chest pain place 1 tablet under the tongue every 5 minutes for 3 doses. If symptoms persist 5 minutes after 1st dose call 911. 15 tablet 3      nortriptyline (PAMELOR) 25 MG capsule Take 25 mg by mouth At Bedtime       pantoprazole (PROTONIX) 40 MG EC tablet TAKE 1 TABLET BY MOUTH EVERY MORNING BEFORE BREAKFAST 90 tablet 1     insulin aspart (NOVOLOG FLEXPEN) 100 UNIT/ML pen Inject Subcutaneous 3 times daily (with meals) Give 14 units tid with meals and SSI  If Blood Sugar is 200 to 249, give 2 Units.  If Blood Sugar is 250 to 299, give 4 Units.  If Blood Sugar is 300 to 349, give 6 Units.  If Blood Sugar is 350 to 399, give 8 Units.  If Blood Sugar is 400 to 450, give 10 Units.  If Blood Sugar is greater than 450, give 12 Units.  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 Only residule: vision impaired.     Cerebral infarction (H)     eye sight decreased in peripheral of right side and blurry     Cerebral infarction (H) 2016     Congestive heart failure (H)      Diabetic ulcer of left midfoot associated with type 2 diabetes mellitus, with fat layer exposed (H) 3/13/2018     Essential hypertension 11/13/2002     Essential hypertension, benign 11/13/2002     Gastroesophageal reflux disease      Generalized osteoarthrosis, unspecified site 11/13/2002     Lightheadedness 12/27/2021     Microalbuminuria 3/13/2018    X1     Myocardial infarction (H)      Myocardial infarction (H) 06/28/2005     Neuropathy      Neuropathy 2016     Sepsis (H)       "Sleep apnea     doesn't use it     Sleep apnea 2006     Stented coronary artery     x 10 stents     Substance abuse (H)      Substance abuse (H)      Syncope, unspecified syncope type 3/13/2018     Syncope, unspecified syncope type 03/13/2018     Thyroid disease     takes medicine     Thyroid disease 2005     Tobacco use disorder 11/13/2002     Type 2 diabetes mellitus (H) 2000     Type 2 diabetes mellitus with diabetic peripheral angiopathy without gangrene, unspecified long term insulin use status 2005     Physical Exam:   Vitals: /66   Pulse 64   Temp 98  F (36.7  C)   Resp 17   Ht 1.905 m (6' 3\")   Wt 105.7 kg (233 lb)   SpO2 97%   BMI 29.12 kg/m    BMI: Body mass index is 29.12 kg/m .  Physical Exam  Constitutional:       Appearance: Normal appearance.   HENT:      Head: Normocephalic and atraumatic.   Eyes:      General: No scleral icterus.  Cardiovascular:      Rate and Rhythm: Normal rate and regular rhythm.      Heart sounds: No murmur heard.  Pulmonary:      Effort: Pulmonary effort is normal.   Musculoskeletal:      Right lower leg: No edema.      Left lower leg: No edema.   Skin:     General: Skin is warm and dry.      Findings: No rash.   Neurological:      General: No focal deficit present.      Mental Status: He is alert.           SNF labs: Recent labs in [facility] reviewed by me today.  and   Most Recent 3 CBC's:Recent Labs   Lab Test 12/01/22  0536 05/09/22  0556 01/07/22  0652   WBC 3.6* 3.5* 3.7*   HGB 10.0* 10.4* 10.0*   MCV 92 91 88    273 224     Most Recent 3 BMP's:Recent Labs   Lab Test 12/01/22  0536 05/09/22  0556 02/14/22  1350 02/14/22  1143 02/11/22  1010    139  --   --  137   POTASSIUM 4.0 3.9  --   --  4.0   CHLORIDE 102 101  --   --  106   CO2 27 26  --   --  26   BUN 18.9 16  --   --  17   CR 1.08 1.04  --   --  1.10   ANIONGAP 10 12  --   --  5   BETTIE 9.3 9.8  --   --  8.5   * 87 142*   < > 196*    < > = values in this interval not displayed. "       DISCHARGE PLAN:    Follow up labs: No labs orders/due    Medical Follow Up:      Follow up with primary care provider in 1-2 weeks    Current [place] scheduled appointments:       Discharge Services: Home Care:  Occupational Therapy and Physical Therapy    Discharge Instructions Verbalized to Patient at Discharge:     Monitor blood glucose monitoring 4 times a day. Keep a record and bring it to your next primary provider visit.     Weigh yourself daily in the morning and keep a record. Call your primary clinic: a) if you are more short of breath, or b) if your weight changes more than 3 pounds in one day or more than 5 pounds in one week.     Wound care: For Upper Back Cyst Removal Surgical Site -every other day 1. Clean wound with moist 4x4 gauze 2. Apply Skin Prep to Periwound Skin. Let Dry. 3. Gently pack saline moistened cut to fit piece of Hydrofera Blue into wound bed. 4. Cover with silicone foam border or island dressing. Time and date. Change Q48 hours. Notify provider and wound care nurse if site worsens or develops signs of infection.    TOTAL DISCHARGE TIME:   Greater than 30 minutes  Electronically signed by:  Herminia Rowe PA-C     Home care Face to Face documentation done in [facility] attached to Home care orders for [facility].

## 2022-12-09 NOTE — TELEPHONE ENCOUNTER
MTM referral from: Transitions of Care (recent hospital discharge or ED visit)    MTM referral outreach attempt #1 on December 9, 2022 at 2:53 PM      Outcome: Patient is not interested at this time because he said his doctor is not in our system, will route to MTM Pharmacist/Provider as an FYI. Thank you for the referral.     Mariana Ramirez [facility]

## 2022-12-19 NOTE — TELEPHONE ENCOUNTER
Refill declined, advised to send to new pcp     Lashaun Arrington, Registered Nurse  [facility]

## 2022-12-30 NOTE — TELEPHONE ENCOUNTER
I returned a fax from [facility] requesting a  refill of Lisinopril. Patient is no longer seeing Dr. King. He is seeing a Cardiologist at [facility].

## 2023-01-01 DIAGNOSIS — I20.0 UNSTABLE ANGINA (H): ICD-10-CM

## 2023-01-01 RX ORDER — CLOPIDOGREL BISULFATE 75 MG/1
75 TABLET ORAL DAILY
Qty: 90 TABLET | Refills: 0 | Status: SHIPPED | OUTPATIENT
Start: 2023-01-01

## 2023-03-22 NOTE — PATIENT INSTRUCTIONS
Thank you for your visit with the [facility] Clinic today.   CORE stands for Cardiomyopathy Optimization Rehabilitation and Education. The CORE clinic will teach and help you to manage your heart failure and keep you out of the hospital.     Today's plan:   1. Continue with your current medications.   2. Continue to check your weights daily and try to stick to a low sodium diet (under 2,000 mg/day).  3. Call the CORE nurse team at the number listed below if you develop signs concerning for fluid retention, including weight gain of over 3 pounds in 1 night or over 5 pounds in 1 week, increasing shortness of breath, or increasing swelling in the legs or abdomen.  4. Follow up with Dr. King next month as planned.    If you have questions or concerns, please do not hesitate to call the CORE Clinic nurse team at [phone]    Scheduling phone number: [phone]    It was a pleasure seeing you today!     LORRAINE De Anda, CNP  Nurse Practitioner  [facility]  February 11, 2022  ________________________________________________________       Pre-Surgery Instructions:   Medication Instructions   • acetaminophen (TYLENOL) 500 mg tablet Uses PRN- OK to take day of surgery   • albuterol (Proventil HFA) 90 mcg/act inhaler Uses PRN- OK to take day of surgery   • ascorbic acid (VITAMIN C) 500 MG tablet Hold day of surgery  • cholecalciferol (VITAMIN D3) 1,000 units tablet Hold day of surgery  • fluticasone-umeclidinium-vilanterol (Trelegy Ellipta) 100-62 5-25 mcg/actuation inhaler Take day of surgery  • folic acid (FOLVITE) 1 mg tablet Hold day of surgery  • Multiple Vitamins-Minerals (multivitamin with minerals) tablet Hold day of surgery  • pantoprazole (PROTONIX) 40 mg tablet Take day of surgery  Medication instructions for day surgery reviewed  Please use only a sip of water to take your instructed medications  Avoid all over the counter vitamins, supplements and NSAIDS for one week prior to surgery per anesthesia guidelines  Tylenol is ok to take as needed  You will receive a call one business day prior to surgery with an arrival time and hospital directions  If your surgery is scheduled on a Monday, the hospital will be calling you on the Friday prior to your surgery  If you have not heard from anyone by 8pm, please call the hospital supervisor through the hospital  at [phone]  [facility] [phone])  Do not eat or drink anything after midnight the night before your surgery, including candy, mints, lifesavers, or chewing gum  Do not drink alcohol 24hrs before your surgery  Try not to smoke at least 24hrs before your surgery  Follow the pre surgery showering instructions as listed in the [facility] Surgical Experience Booklet” or otherwise provided by your surgeon's office  Do not shave the surgical area 24 hours before surgery  Do not apply any lotions, creams, including makeup, cologne, deodorant, or perfumes after showering on the day of your surgery  No contact lenses, eye make-up, or artificial eyelashes   Remove "nail polish, including gel polish, and any artificial, gel, or acrylic nails if possible  Remove all jewelry including rings and body piercing jewelry  Wear causal clothing that is easy to take on and off  Consider your type of surgery  Keep any valuables, jewelry, piercings at home  Please bring any specially ordered equipment (sling, braces) if indicated  Arrange for a responsible person to drive you to and from the hospital on the day of your surgery  Visitor Guidelines discussed  Call the surgeon's office with any new illnesses, exposures, or additional questions prior to surgery  Please reference your [facility] Surgical Experience Booklet” for additional information to prepare for your upcoming surgery  See Geriatric Assessment below       • Cognitive Assessment:   • CAM:   • TUG <15 sec:  • Falls (last 6 months): No  • Hand  score:  -Attempt 1:  -Attempt 2:  -Attempt 3:  • Sergei Total Score:19   • PHQ- 9 Depression Scale:4  • Nutrition Assessment Score:8  • METS:6 36  • Health goals:  -What are your overall health goals? (quit smoking, wt  loss, rest, decrease stress)  \"to gain a couple pounds\"  -What brings you strength? (family, friends, [employer], health)  \"accomplishments\" and going to [employer]"  -What activities are important to you? (exercise, reading, travel, work)              \"fixing and remodeling-[place]

## 2023-06-06 NOTE — PHARMACY-ADMISSION MEDICATION HISTORY
Admission medication history interview status for the 4/20/2017  admission is complete. See EPIC admission navigator for prior to admission medications     Medication history source reliability:Good    Actions taken by pharmacist (provider contacted, etc):interviewed patient and called [facility] pharmacy in [place]     Additional medication history information not noted on PTA med list :None    Medication reconciliation/reorder completed by provider prior to medication history? Yes    Time spent in this activity: 20 minutes    Prior to Admission medications    Medication Sig Last Dose Taking? Auth Provider   insulin glargine (BASAGLAR KWIKPEN) 100 UNIT/ML injection Inject 40 Units Subcutaneous every 24 hours 4/20/2017 at 1600 Yes Unknown, Entered By History   IBUPROFEN PO Take 400 mg by mouth every 8 hours as needed for moderate pain 4/20/2017 at Unknown time Yes Unknown, Entered By History   clopidogrel (PLAVIX) 75 MG tablet Take 1 tablet (75 mg) by mouth daily 4/19/2017 at Unknown time Yes Johnathan Mckeon MD   simvastatin (ZOCOR) 40 MG tablet Take 1 tablet (40 mg) by mouth At Bedtime 4/19/2017 at Unknown time Yes Johnathan Mckeon MD   metoprolol (TOPROL-XL) 100 MG 24 hr tablet Take 1 tablet (100 mg) by mouth At Bedtime 4/19/2017 at Unknown time Yes Johnathan Mckeon MD   lisinopril (PRINIVIL,ZESTRIL) 20 MG tablet Take 1 tablet (20 mg) by mouth daily  Patient taking differently: Take 20 mg by mouth At Bedtime  4/19/2017 at Unknown time Yes Johnathan Mckeon MD   amLODIPine (NORVASC) 5 MG tablet Take 1 tablet (5 mg) by mouth daily 4/20/2017 at Unknown time Yes Johnathan Mckeon MD   sitagliptan (JANUVIA) 50 MG tablet Take 50 mg by mouth daily 4/20/2017 at Unknown time Yes Reported, Patient   pantoprazole (PROTONIX) 40 MG enteric coated tablet Take 1 tablet (40 mg) by mouth every morning (before breakfast) 4/20/2017 at Unknown time Yes nAa Frankel MD   metFORMIN (GLUCOPHAGE) 1000 MG tablet Take 1 tablet  (1,000 mg) by mouth 2 times daily (with meals) 4/20/2017 at Unknown time Yes Ana Frankel MD   aspirin 81 MG EC tablet Take 1 tablet (81 mg) by mouth daily 4/20/2017 at Unknown time Yes Aan Frankel MD   levothyroxine (SYNTHROID, LEVOTHROID) 137 MCG tablet Take 137 mcg by mouth At Bedtime  4/20/2017 at Unknown time Yes Henrry Cheney PA-C   ONE TOUCH ULTRA TEST VI STRP use qid 4/20/2017 at Unknown time Yes Benja Buckley MD   ONE TOUCH LANCETS MISC use qid 4/20/2017 at Unknown time Yes [place] MD   nitroglycerin (NITROSTAT) 0.4 MG SL tablet Place 1 tablet (0.4 mg) under the tongue every 5 minutes as needed for chest pain Unknown at Unknown time  Ana Frankel MD          [FreeTextEntry1] : Mr. Valderrama is an 85 year old man with ACC/AHA Stage D ICM/HFrEF (weaned off dobutamine since 11/1/21). He is s/p dual chamber ICD (9/13/19) with upgrade of device to CRT-D (3/25/22) for high burden of RV pacing due to AV delay. He has a history of severe MR and TR, s/p Mitraclip x 2 (9/6/19), s/p CardioMEMs 10/1/19 (goal PAD 15-20), CAD c/b MI s/p SILVINA mLAD, PAD with stents (2005), CKD stage 4, HTN, HLD, COPD, DENNYS on CPAP,  Aflutter s/p DCCV on 11/7/20 (on Xarelto), DVT, and recurrent SBOs who presents today for routine follow-up of his cardiomyopathy.\par \par He has had multiple readmissions for SOB, in February, March and June 2022, ultimately underwent ex lap with small bowel resection on 6/21/23 due to perforation and entrapment. More recently, hospitalized in September 2022 for AMS due to hypoglycemia and UTI in the setting of starting Farxiga. \par \par When last seen 2 weeks ago by Dr. Parrish, was stable from a HF perspective\par CardioMEMS PAD has been at goal, today 17\par \par Exam:\par \par Plan:\par Will inc PM dose of Entrsto to mod +/- stopping HDZN if needed \par Continue Coreg 6.25 mg BID, HDZN 25 mg TID and ISDN 30 mg TID\par Continue torsemide 10 QD for maintainence of euvolemia, further guidance by MEMS\par Labs in one week\par Follow-up NP?\par Follow-up Shivani as scheduled in 3 months\par

## 2023-06-06 NOTE — PROGRESS NOTES
----- Message from Alexandra Reeves NP sent at 6/6/2023 10:30 AM CDT -----  Small lung nodules present none concerning   Continue with annual lung screening    Clinic Care Coordination Contact  [facility]/Voicemail    Referral Source: IP Handoff  Clinical Data: Care Coordinator Outreach  Outreach attempted x 2.  Left message on voicemail with call back information and requested return call.  Plan:Care Coordinator will try to reach patient again in 3-5 business days.  ENROLLMENT STATUS:  Potential  CARE COORDINATOR STATUS: Care team current.     Abril Dawson, RN  [facility] Care Coordinator - [place] and [place] Locations   Direct:  [phone] (voicemail available)   (Today's Date: 09/24/2018)

## 2023-09-24 NOTE — NURSING NOTE
"Chief Complaint   Patient presents with     Cough     /78   Pulse 70   Temp 97.9  F (36.6  C) (Oral)   Resp 16   Ht 1.93 m (6' 4\")   Wt 112 kg (247 lb)   SpO2 95%   BMI 30.07 kg/m   Estimated body mass index is 30.07 kg/m  as calculated from the following:    Height as of this encounter: 1.93 m (6' 4\").    Weight as of this encounter: 112 kg (247 lb).  bp completed using cuff size: large       Health Maintenance addressed:  NONE    n/a    Marquita Frausto MA     " No

## 2024-08-22 NOTE — PROGRESS NOTES
Patient's After Visit Summary was reviewed with patient   Patient verbalized understanding of After Visit Summary, recommended follow up and was given an opportunity to ask questions.   Discharge medications sent home with patient/family: Not applicable   Discharged with spouse         Detail Level: Zone Plan: I recommended a broad spectrum sunscreen with a SPF of 30 or higher.  I explained that SPF 30 sunscreens block approximately 97 percent of the sun's harmful rays.  Sunscreens should be applied at least 15 minutes prior to expected sun exposure and then every 2 hours after that as long as sun exposure continues. If swimming or exercising sunscreen should be reapplied every 45 minutes to an hour after getting wet or sweating.  One ounce, or the equivalent of a shot glass full of sunscreen, is adequate to protect the skin not covered by a bathing suit. I also recommended a lip balm with a sunscreen as well. Sun protective clothing can be used in lieu of sunscreen but must be worn the entire time you are exposed to the sun's rays.

## 2024-12-22 NOTE — PHARMACY-ADMISSION MEDICATION HISTORY
Admission medication history interview status for this patient is complete. See [facility] admission navigator for allergy information, prior to admission medications and immunization status.     Medication history interview source(s):Patient  Medication history resources (including written lists, pill bottles, clinic record):None    Changes made to PTA medication list:  Added: aspirin, nonformulary patch for diabetes (orders online, supplement - not sure of ingredients?)  Deleted: none  Changed: novolog 8 units with meals --> 8 with breakfast/lunch, 10 w/ dinner    Actions taken by pharmacist (provider contacted, etc):None     Additional medication history information:None    Medication reconciliation/reorder completed by provider prior to medication history? No    Do you take OTC medications (eg tylenol, ibuprofen, fish oil, eye/ear drops, etc)? Y(Y/N)    For patients on insulin therapy: Y (Y/N)  Lantus/  Sliding scale Novolog Y  If Yes, do you have a baseline novolog pre-meal dose: 8-8-10  Patients eat three meals a day:   Y   How many episodes of hypoglycemia do you have per week: -  How many missed doses do you have per week: -  How many times do you check your blood glucose per day: 3  Do you have a Continuous glucose monitor (CGM)   Y/N (remind pt that not approved for hospital use)   Any Barriers to therapy - Be specific :  cost of medications, comfortable with giving injections (if applicable), comfortable and confident with current diabetes regimen: N      Prior to Admission medications    Medication Sig Last Dose Taking? Auth Provider   apixaban ANTICOAGULANT (ELIQUIS) 5 MG tablet Take 1 tablet (5 mg) by mouth 2 times daily 7/5/2019 at am Yes Gill Doty PA   aspirin 81 MG EC tablet Take 81 mg by mouth daily 7/5/2019 at am Yes Unknown, Entered By History   atorvastatin (LIPITOR) 40 MG tablet Take 1 tablet (40 mg) by mouth every evening 7/4/2019 at pm Yes Gill Doty PA   carvedilol (COREG) 25 MG tablet  Take 1 tablet (25 mg) by mouth 2 times daily (with meals) 7/5/2019 at am Yes Gill Doty PA   Cholecalciferol (VITAMIN D3) 2000 units TABS Take 1 tablet by mouth daily 7/5/2019 at am Yes Unknown, Entered By History   furosemide (LASIX) 80 MG tablet Take 1 tablet (80 mg) by mouth daily 7/4/2019 at am Yes Gill Doty PA   insulin aspart (NOVOLOG FLEXPEN) 100 UNIT/ML pen Inject 8 Units Subcutaneous daily (with lunch) 7/4/2019 at noon Yes Unknown, Entered By History   insulin aspart (NOVOLOG FLEXPEN) 100 UNIT/ML pen Inject 10 Units Subcutaneous daily (with dinner) 7/4/2019 at Unknown time Yes Unknown, Entered By History   insulin aspart (NOVOLOG FLEXPEN) 100 UNIT/ML pen Inject Subcutaneous 3 times daily (with meals) Sliding Scale  Do not give sliding scale insulin if Pre-Meal BG less than 140.   For Pre-Meal:   - 200 give 2 units.    - 250 give 4 units.    - 300 give 6 units.    - 350 give 8 units.    - 400 give 10 units. 7/4/2019 at Unknown time Yes Unknown, Entered By History   insulin aspart (NOVOLOG PEN) 100 UNIT/ML pen Inject 8 Units Subcutaneous daily (with breakfast)  7/5/2019 at only 4 units Yes Unknown, Entered By History   insulin glargine (LANTUS SOLOSTAR PEN) 100 UNIT/ML pen Inject 36 Units Subcutaneous every morning 7/5/2019 at am Yes Unknown, Entered By History   isosorbide mononitrate (IMDUR) 30 MG 24 hr tablet Take 1 tablet (30 mg) by mouth daily 7/5/2019 at am Yes Johnathan Mckeon MD   levothyroxine (SYNTHROID, LEVOTHROID) 137 MCG tablet Take 137 mcg by mouth daily  7/5/2019 at am Yes Henrry Figueroa PA-C   lisinopril (PRINIVIL/ZESTRIL) 5 MG tablet Take 1 tablet (5 mg) by mouth daily 7/5/2019 at am Yes Gill Doty PA   NONFORMULARY Topical patch for diabetes (blood glucose control) - changes every 4 days or so Past Week at does not have on now Yes Unknown, Entered By History   pantoprazole (PROTONIX) 40 MG enteric coated tablet Take 1 tablet (40 mg) by  mouth every morning (before breakfast) 7/5/2019 at am Yes Ana Frankel MD   potassium chloride ER (K-DUR/KLOR-CON M) 10 MEQ CR tablet Take 1 tablet (10 mEq) by mouth 2 times daily 7/5/2019 at am Yes Justin Tomlin MD   insulin pen needle 31G X 6 MM Use  as directed. Unknown at Unknown time  Vinicio Cook MD   nitroglycerin (NITROSTAT) 0.4 MG sublingual tablet Place 1 tablet (0.4 mg) under the tongue every 5 minutes as needed for chest pain Unknown at Unknown time  Davion Cordova PA-C   order for DME Equipment being ordered: Other: surgical shoe male XL  Treatment Diagnosis: sp right 2nd toe amputaion Unknown at Unknown time  Ryan Bhagat DPM   order for DME Equipment being ordered: Walker Wheels () and Walker ()  Treatment Diagnosis: Impaired gait, heel WB bilaterally. Unknown at Unknown time  Herb Zurita III, MD            Continuous Pulse Oximetry

## (undated) DEVICE — SUCTION CANISTER MEDIVAC LINER 3000ML W/LID 65651-530

## (undated) DEVICE — GLIDEWIRE TERUMO .035X180CM 1.5,, J-TIP GR3525

## (undated) DEVICE — PACK LOWER EXTREMITY RIDGES

## (undated) DEVICE — PREP SKIN SCRUB TRAY 4461A

## (undated) DEVICE — BNDG ELASTIC 4"X5YDS UNSTERILE 6611-40

## (undated) DEVICE — PACK EXTREMITY SOP15EXFSD

## (undated) DEVICE — PREP POVIDONE-IODINE 7.5% SCRUB 4OZ BOTTLE MDS093945

## (undated) DEVICE — ESU PENCIL W/HOLSTER E2350H

## (undated) DEVICE — ESU GROUND PAD ADULT W/CORD E7507

## (undated) DEVICE — CATH DIAG 4FR ANG PIG 538453S

## (undated) DEVICE — PREP POVIDONE IODINE SOLUTION 10% 4OZ BOTTLE 29906-004

## (undated) DEVICE — LINEN FULL SHEET 5511

## (undated) DEVICE — PREP POVIDONE IODINE SCRUB 7.5% 4OZ APL82212

## (undated) DEVICE — SU ETHILON 3-0 PS-2 18" 1669H

## (undated) DEVICE — GLOVE PROTEXIS POWDER FREE 6.5 ORTHOPEDIC 2D73ET65

## (undated) DEVICE — SYR 10ML FINGER CONTROL W/O NDL 309695

## (undated) DEVICE — SU VICRYL 4-0 PS-2 18" UND J496H

## (undated) DEVICE — GLOVE PROTEXIS MICRO 8.0  2D73PM80

## (undated) DEVICE — CATH ANGIO INFINITI 3DRC 4FRX100CM 538476

## (undated) DEVICE — GOWN XLG DISP 9545

## (undated) DEVICE — LINEN HALF SHEET 5512

## (undated) DEVICE — DRSG KERLIX FLUFFS X5

## (undated) DEVICE — CAST PADDING 4" STERILE 9044S

## (undated) DEVICE — CATH DIAG 4FR JL 4.5 538417

## (undated) DEVICE — GW SURG OMNIWIRE STRAIGHT TIP L185CM PRESSURE GU 89185

## (undated) DEVICE — GLOVE PROTEXIS POWDER FREE 7.5 ORTHOPEDIC 2D73ET75

## (undated) DEVICE — BAG CLEAR TRASH 1.3M 39X33" P4040C

## (undated) DEVICE — LINEN TOWEL PACK X5 5464

## (undated) DEVICE — GLOVE PROTEXIS W/NEU-THERA 7.5  2D73TE75

## (undated) DEVICE — APPLICATOR COTTON TIP 6"X2 STERILE LF 6012

## (undated) DEVICE — SU PROLENE 3-0 PS-2 18" 8687H

## (undated) DEVICE — DEFIB PRO-PADZ LVP LQD GEL ADULT 8900-2105-01

## (undated) DEVICE — GLOVE PROTEXIS W/NEU-THERA 6.5  2D73TE65

## (undated) DEVICE — LINEN TOWEL PACK X10 5473

## (undated) DEVICE — PREP POVIDONE IODINE SOLUTION 10% 4OZ

## (undated) DEVICE — SOL WATER IRRIG 1000ML BOTTLE 2F7114

## (undated) DEVICE — GLOVE PROTEXIS BLUE W/NEU-THERA 7.5  2D73EB75

## (undated) DEVICE — NDL 18GA 1.5" 305196

## (undated) DEVICE — BNDG KLING 3" 2232

## (undated) DEVICE — BLADE KNIFE SURG 15 371115

## (undated) DEVICE — PREP POVIDONE IODINE SCRUB 7.5% 120ML

## (undated) DEVICE — CATH THROMBECTOMY PRONTO 5.5FRX138CM 4005

## (undated) DEVICE — NDL 19GA 1.5"

## (undated) DEVICE — BLADE SAW SAGITTAL 25.5X9.5X.4MM FINE LINVATEC 5023-138

## (undated) DEVICE — CAST PADDING 4" UNSTERILE 9044

## (undated) DEVICE — SLEEVE TR BAND RADIAL COMPRESSION DEVICE 29CM XX-RF06L

## (undated) DEVICE — PACK HEART LEFT CUSTOM

## (undated) DEVICE — DRSG XEROFORM 1X8"

## (undated) DEVICE — KIT HAND CONTROL ACIST 014644 AR-P54

## (undated) DEVICE — MANIFOLD KIT ANGIO AUTOMATED 014613

## (undated) DEVICE — SU PROLENE 4-0 PS-2 18" 8682G

## (undated) DEVICE — TOURNIQUET SGL BLADDER 18"X4" RED 5921-218-135

## (undated) DEVICE — TUBE CULTURE AEROBIC/ANAEROBIC W/O SWABS A.C.T.I.1. 12401

## (undated) DEVICE — VALVE HEMOSTASIS .115" DUOSTAT ADAPTER 23245

## (undated) DEVICE — GUIDEWIRE VASC 0.035INX150CM INQWIRE J TIP IQ35F150J3F/A

## (undated) DEVICE — GUIDEWIRE VASC 0.014INX190CM J TIP CGRXT190HJ

## (undated) DEVICE — CAST PADDING 6" STERILE 9046S

## (undated) DEVICE — CATH BALLOON EMERGE 2.5X20MM H7493918920250

## (undated) DEVICE — DRSG ADAPTIC 3X8" 6113

## (undated) DEVICE — CATH BALLOON NC EMERGE 3.75X20MM H7493926720370

## (undated) DEVICE — SU VICRYL 0 CT-2 27" UND J270H

## (undated) DEVICE — NDL 27GA 1.25" 305136

## (undated) DEVICE — SOL NACL 0.9% IRRIG 1000ML BOTTLE 2F7124

## (undated) DEVICE — DRAPE EXTREMITY BILAT

## (undated) DEVICE — PACK CUSTOM CATH LAB RIDGES LF PC15CCFCJ

## (undated) DEVICE — NDL 25GA 1.5" 305127

## (undated) DEVICE — LINEN ORTHO ACL PACK 5447

## (undated) DEVICE — GLOVE PROTEXIS W/NEU-THERA 8.0  2D73TE80

## (undated) DEVICE — INTRO SHEATH 4FRX10CM PINNACLE RSS402

## (undated) DEVICE — DRSG KERLIX 4 1/2"X4YDS ROLL 6730

## (undated) DEVICE — KIT HAND CONTROL ANGIOTOUCH ACIST 65CM AT-P65

## (undated) DEVICE — GLOVE PROTEXIS POWDER FREE 8.0 ORTHOPEDIC 2D73ET80

## (undated) DEVICE — TOURNIQUET CUFF 18" REPRO RED 60-7070-103

## (undated) DEVICE — GLOVE PROTEXIS BLUE W/NEU-THERA 7.0  2D73EB70

## (undated) DEVICE — GLOVE PROTEXIS BLUE W/NEU-THERA 6.5  2D73EB65

## (undated) DEVICE — KIT LG BORE TOUHY ACCESS PLUS MAP152

## (undated) DEVICE — FASTENER CATH BALLOON CLAMPX2 STATLOCK 0684-00-493

## (undated) DEVICE — DRSG GAUZE 4X4" TRAY

## (undated) DEVICE — 0.035IN X 260CM, EMERALD DIAGNOSTIC GUIDEWIRE, FIXED-CORE PTFE COATED, STANDARD, 3MM EXCHANGE J-TIP (EA/1)

## (undated) DEVICE — GLOVE PROTEXIS W/NEU-THERA 7.0  2D73TE70

## (undated) DEVICE — ESU GROUND PAD UNIVERSAL W/O CORD

## (undated) DEVICE — PAD CHUX UNDERPAD 23X24" 7136

## (undated) DEVICE — MEPILEX

## (undated) DEVICE — NDL 22GA 1.5"

## (undated) DEVICE — IMM LIMB ELEVATOR DC40-0203

## (undated) DEVICE — TUBING PRESSURE 30"

## (undated) DEVICE — CATH GUIDING IKARI 6FR IL3.5 LEFT HEARTRAIL 40-6370

## (undated) DEVICE — GUIDEWIRE OPTOWIRE 3 W/O GAUGE FACTOR CONNECTOR F1032

## (undated) DEVICE — SU MONOCRYL 3-0 SH 27" UND Y416H

## (undated) DEVICE — SU ETHILON 4-0 PS-2 18" BLACK 1667H

## (undated) DEVICE — CATH JACKY 5FR 3.5 CURVE 40-5023

## (undated) DEVICE — CATH LAUNCHER 6FR JR 4.0 LA6JR40

## (undated) DEVICE — CLOSURE ANGIOSEAL 6FR 610130

## (undated) DEVICE — DRSG ABDOMINAL 07 1/2X8" 7197D

## (undated) DEVICE — GLOVE PROTEXIS POWDER FREE SMT 8.0  2D72PT80X

## (undated) DEVICE — INTRODUCER SHEATH GREEN 6.5FRX11CM .038IN PSI-6F-11-038ACT

## (undated) DEVICE — PREP CHLORAPREP 26ML TINTED ORANGE  260815

## (undated) DEVICE — PREP POVIDONE IODINE SOLUTION 10% 120ML

## (undated) DEVICE — TUBE CULTURE ANAEROBIC PORT-A-CUL 11ML

## (undated) DEVICE — GOWN IMPERVIOUS SPECIALTY XLG/XLONG 32474

## (undated) DEVICE — GLOVE PROTEXIS W/NEU-THERA 8.5  2D73TE85

## (undated) DEVICE — DRAPE POUCH IRR 1016

## (undated) DEVICE — SHTH INTRO 0.021IN ID 6FR DIA

## (undated) DEVICE — DRSG KERLIX 4 1/2"X4YDS ROLL 6715

## (undated) DEVICE — RAD INFLATOR BASIC COMPAK  IN4130

## (undated) DEVICE — DRAPE STERI TOWEL LG 1010

## (undated) RX ORDER — DEXAMETHASONE SODIUM PHOSPHATE 4 MG/ML
INJECTION, SOLUTION INTRA-ARTICULAR; INTRALESIONAL; INTRAMUSCULAR; INTRAVENOUS; SOFT TISSUE
Status: DISPENSED
Start: 2019-05-06

## (undated) RX ORDER — FENTANYL CITRATE 50 UG/ML
INJECTION, SOLUTION INTRAMUSCULAR; INTRAVENOUS
Status: DISPENSED
Start: 2019-01-06

## (undated) RX ORDER — ONDANSETRON 2 MG/ML
INJECTION INTRAMUSCULAR; INTRAVENOUS
Status: DISPENSED
Start: 2019-05-06

## (undated) RX ORDER — PROPOFOL 10 MG/ML
INJECTION, EMULSION INTRAVENOUS
Status: DISPENSED
Start: 2019-01-06

## (undated) RX ORDER — BUPIVACAINE HYDROCHLORIDE 5 MG/ML
INJECTION, SOLUTION EPIDURAL; INTRACAUDAL
Status: DISPENSED
Start: 2018-09-11

## (undated) RX ORDER — VERAPAMIL HYDROCHLORIDE 2.5 MG/ML
INJECTION, SOLUTION INTRAVENOUS
Status: DISPENSED
Start: 2017-06-16

## (undated) RX ORDER — REGADENOSON 0.08 MG/ML
INJECTION, SOLUTION INTRAVENOUS
Status: DISPENSED
Start: 2017-06-02

## (undated) RX ORDER — FENTANYL CITRATE 50 UG/ML
INJECTION, SOLUTION INTRAMUSCULAR; INTRAVENOUS
Status: DISPENSED
Start: 2017-06-16

## (undated) RX ORDER — GLYCOPYRROLATE 0.2 MG/ML
INJECTION INTRAMUSCULAR; INTRAVENOUS
Status: DISPENSED
Start: 2019-05-06

## (undated) RX ORDER — FENTANYL CITRATE 50 UG/ML
INJECTION, SOLUTION INTRAMUSCULAR; INTRAVENOUS
Status: DISPENSED
Start: 2017-06-13

## (undated) RX ORDER — BUPIVACAINE HYDROCHLORIDE 5 MG/ML
INJECTION, SOLUTION EPIDURAL; INTRACAUDAL
Status: DISPENSED
Start: 2021-05-08

## (undated) RX ORDER — OXYCODONE HYDROCHLORIDE 5 MG/1
TABLET ORAL
Status: DISPENSED
Start: 2021-05-08

## (undated) RX ORDER — BUPIVACAINE HYDROCHLORIDE 2.5 MG/ML
INJECTION, SOLUTION EPIDURAL; INFILTRATION; INTRACAUDAL
Status: DISPENSED
Start: 2019-03-12

## (undated) RX ORDER — ONDANSETRON 2 MG/ML
INJECTION INTRAMUSCULAR; INTRAVENOUS
Status: DISPENSED
Start: 2019-01-06

## (undated) RX ORDER — BUPIVACAINE HYDROCHLORIDE 5 MG/ML
INJECTION, SOLUTION EPIDURAL; INTRACAUDAL
Status: DISPENSED
Start: 2022-01-01

## (undated) RX ORDER — METOPROLOL TARTRATE 1 MG/ML
INJECTION, SOLUTION INTRAVENOUS
Status: DISPENSED
Start: 2019-01-08

## (undated) RX ORDER — FENTANYL CITRATE 50 UG/ML
INJECTION, SOLUTION INTRAMUSCULAR; INTRAVENOUS
Status: DISPENSED
Start: 2019-03-12

## (undated) RX ORDER — HEPARIN SODIUM 1000 [USP'U]/ML
INJECTION, SOLUTION INTRAVENOUS; SUBCUTANEOUS
Status: DISPENSED
Start: 2017-06-16

## (undated) RX ORDER — NITROGLYCERIN 5 MG/ML
VIAL (ML) INTRAVENOUS
Status: DISPENSED
Start: 2017-06-16

## (undated) RX ORDER — BUPIVACAINE HYDROCHLORIDE 5 MG/ML
INJECTION, SOLUTION EPIDURAL; INTRACAUDAL
Status: DISPENSED
Start: 2019-05-06

## (undated) RX ORDER — FENTANYL CITRATE 50 UG/ML
INJECTION, SOLUTION INTRAMUSCULAR; INTRAVENOUS
Status: DISPENSED
Start: 2019-05-06

## (undated) RX ORDER — ASPIRIN 81 MG/1
TABLET ORAL
Status: DISPENSED
Start: 2017-06-13

## (undated) RX ORDER — HYDRALAZINE HYDROCHLORIDE 20 MG/ML
INJECTION INTRAMUSCULAR; INTRAVENOUS
Status: DISPENSED
Start: 2019-01-08

## (undated) RX ORDER — LIDOCAINE HYDROCHLORIDE 10 MG/ML
INJECTION, SOLUTION EPIDURAL; INFILTRATION; INTRACAUDAL; PERINEURAL
Status: DISPENSED
Start: 2019-05-06

## (undated) RX ORDER — HYDRALAZINE HYDROCHLORIDE 20 MG/ML
INJECTION INTRAMUSCULAR; INTRAVENOUS
Status: DISPENSED
Start: 2021-05-08

## (undated) RX ORDER — FENTANYL CITRATE 50 UG/ML
INJECTION, SOLUTION INTRAMUSCULAR; INTRAVENOUS
Status: DISPENSED
Start: 2021-04-09

## (undated) RX ORDER — LIDOCAINE HYDROCHLORIDE 10 MG/ML
INJECTION, SOLUTION EPIDURAL; INFILTRATION; INTRACAUDAL; PERINEURAL
Status: DISPENSED
Start: 2019-01-08

## (undated) RX ORDER — FENTANYL CITRATE 50 UG/ML
INJECTION, SOLUTION INTRAMUSCULAR; INTRAVENOUS
Status: DISPENSED
Start: 2020-04-18

## (undated) RX ORDER — VERAPAMIL HYDROCHLORIDE 2.5 MG/ML
INJECTION, SOLUTION INTRAVENOUS
Status: DISPENSED
Start: 2017-06-13

## (undated) RX ORDER — FENTANYL CITRATE 50 UG/ML
INJECTION, SOLUTION INTRAMUSCULAR; INTRAVENOUS
Status: DISPENSED
Start: 2022-01-01

## (undated) RX ORDER — HYDRALAZINE HYDROCHLORIDE 20 MG/ML
INJECTION INTRAMUSCULAR; INTRAVENOUS
Status: DISPENSED
Start: 2018-09-11

## (undated) RX ORDER — AMPICILLIN AND SULBACTAM 2; 1 G/1; G/1
INJECTION, POWDER, FOR SOLUTION INTRAMUSCULAR; INTRAVENOUS
Status: DISPENSED
Start: 2019-03-12

## (undated) RX ORDER — BUPIVACAINE HYDROCHLORIDE 2.5 MG/ML
INJECTION, SOLUTION EPIDURAL; INFILTRATION; INTRACAUDAL
Status: DISPENSED
Start: 2020-04-18

## (undated) RX ORDER — HEPARIN SODIUM 1000 [USP'U]/ML
INJECTION, SOLUTION INTRAVENOUS; SUBCUTANEOUS
Status: DISPENSED
Start: 2017-06-13

## (undated) RX ORDER — BUPIVACAINE HYDROCHLORIDE 2.5 MG/ML
INJECTION, SOLUTION EPIDURAL; INFILTRATION; INTRACAUDAL
Status: DISPENSED
Start: 2019-01-08

## (undated) RX ORDER — CLINDAMYCIN PHOSPHATE 900 MG/50ML
INJECTION, SOLUTION INTRAVENOUS
Status: DISPENSED
Start: 2020-04-18

## (undated) RX ORDER — FENTANYL CITRATE 50 UG/ML
INJECTION, SOLUTION INTRAMUSCULAR; INTRAVENOUS
Status: DISPENSED
Start: 2021-05-08

## (undated) RX ORDER — NITROGLYCERIN 5 MG/ML
VIAL (ML) INTRAVENOUS
Status: DISPENSED
Start: 2021-04-09

## (undated) RX ORDER — HYDROCODONE BITARTRATE AND ACETAMINOPHEN 5; 325 MG/1; MG/1
TABLET ORAL
Status: DISPENSED
Start: 2020-04-18

## (undated) RX ORDER — PROPOFOL 10 MG/ML
INJECTION, EMULSION INTRAVENOUS
Status: DISPENSED
Start: 2019-05-06

## (undated) RX ORDER — PROPOFOL 10 MG/ML
INJECTION, EMULSION INTRAVENOUS
Status: DISPENSED
Start: 2019-01-08

## (undated) RX ORDER — ADENOSINE 3 MG/ML
INJECTION, SOLUTION INTRAVENOUS
Status: DISPENSED
Start: 2021-04-09

## (undated) RX ORDER — PROPOFOL 10 MG/ML
INJECTION, EMULSION INTRAVENOUS
Status: DISPENSED
Start: 2022-01-01

## (undated) RX ORDER — CLOPIDOGREL BISULFATE 75 MG/1
TABLET ORAL
Status: DISPENSED
Start: 2017-06-13

## (undated) RX ORDER — HEPARIN SODIUM 1000 [USP'U]/ML
INJECTION, SOLUTION INTRAVENOUS; SUBCUTANEOUS
Status: DISPENSED
Start: 2021-04-09

## (undated) RX ORDER — CEFAZOLIN SODIUM/WATER 2 G/20 ML
SYRINGE (ML) INTRAVENOUS
Status: DISPENSED
Start: 2022-01-01

## (undated) RX ORDER — VERAPAMIL HYDROCHLORIDE 2.5 MG/ML
INJECTION, SOLUTION INTRAVENOUS
Status: DISPENSED
Start: 2021-04-09

## (undated) RX ORDER — PHENYLEPHRINE HCL IN 0.9% NACL 1 MG/10 ML
SYRINGE (ML) INTRAVENOUS
Status: DISPENSED
Start: 2019-05-06

## (undated) RX ORDER — ONDANSETRON 2 MG/ML
INJECTION INTRAMUSCULAR; INTRAVENOUS
Status: DISPENSED
Start: 2020-04-18

## (undated) RX ORDER — FENTANYL CITRATE 50 UG/ML
INJECTION, SOLUTION INTRAMUSCULAR; INTRAVENOUS
Status: DISPENSED
Start: 2019-01-08

## (undated) RX ORDER — DEXAMETHASONE SODIUM PHOSPHATE 4 MG/ML
INJECTION, SOLUTION INTRA-ARTICULAR; INTRALESIONAL; INTRAMUSCULAR; INTRAVENOUS; SOFT TISSUE
Status: DISPENSED
Start: 2019-01-06

## (undated) RX ORDER — ADENOSINE 3 MG/ML
INJECTION, SOLUTION INTRAVENOUS
Status: DISPENSED
Start: 2017-06-16

## (undated) RX ORDER — CEFAZOLIN SODIUM 2 G/100ML
INJECTION, SOLUTION INTRAVENOUS
Status: DISPENSED
Start: 2021-05-08

## (undated) RX ORDER — PROPOFOL 10 MG/ML
INJECTION, EMULSION INTRAVENOUS
Status: DISPENSED
Start: 2020-04-18

## (undated) RX ORDER — NEOSTIGMINE METHYLSULFATE 1 MG/ML
VIAL (ML) INJECTION
Status: DISPENSED
Start: 2019-05-06

## (undated) RX ORDER — KETAMINE HCL IN 0.9 % NACL 50 MG/5 ML
SYRINGE (ML) INTRAVENOUS
Status: DISPENSED
Start: 2019-01-08